# Patient Record
Sex: MALE | Race: WHITE | NOT HISPANIC OR LATINO | Employment: OTHER | URBAN - METROPOLITAN AREA
[De-identification: names, ages, dates, MRNs, and addresses within clinical notes are randomized per-mention and may not be internally consistent; named-entity substitution may affect disease eponyms.]

---

## 2017-01-01 ENCOUNTER — LAB VISIT (OUTPATIENT)
Dept: LAB | Facility: HOSPITAL | Age: 42
End: 2017-01-01
Attending: INTERNAL MEDICINE
Payer: MEDICARE

## 2017-01-01 ENCOUNTER — PATIENT MESSAGE (OUTPATIENT)
Dept: TRANSPLANT | Facility: CLINIC | Age: 42
End: 2017-01-01

## 2017-01-01 ENCOUNTER — TELEPHONE (OUTPATIENT)
Dept: CARDIOTHORACIC SURGERY | Facility: CLINIC | Age: 42
End: 2017-01-01

## 2017-01-01 ENCOUNTER — INFUSION (OUTPATIENT)
Dept: INFUSION THERAPY | Facility: HOSPITAL | Age: 42
End: 2017-01-01
Attending: INTERNAL MEDICINE
Payer: MEDICARE

## 2017-01-01 ENCOUNTER — HOSPITAL ENCOUNTER (OUTPATIENT)
Dept: RADIOLOGY | Facility: HOSPITAL | Age: 42
Discharge: HOME | End: 2017-10-04
Attending: INTERNAL MEDICINE
Payer: MEDICARE

## 2017-01-01 ENCOUNTER — HOSPITAL ENCOUNTER (OUTPATIENT)
Dept: RADIOLOGY | Facility: CLINIC | Age: 42
Discharge: HOME | End: 2017-11-30
Attending: INTERNAL MEDICINE
Payer: MEDICARE

## 2017-01-01 ENCOUNTER — TELEPHONE (OUTPATIENT)
Dept: TRANSPLANT | Facility: CLINIC | Age: 42
End: 2017-01-01

## 2017-01-01 ENCOUNTER — OFFICE VISIT (OUTPATIENT)
Dept: INTERNAL MEDICINE | Facility: CLINIC | Age: 42
End: 2017-01-01
Payer: MEDICARE

## 2017-01-01 ENCOUNTER — HOSPITAL ENCOUNTER (INPATIENT)
Facility: HOSPITAL | Age: 42
LOS: 5 days | Discharge: HOME | DRG: 193 | End: 2017-12-16
Attending: EMERGENCY MEDICINE | Admitting: INTERNAL MEDICINE
Payer: MEDICARE

## 2017-01-01 ENCOUNTER — OFFICE VISIT (OUTPATIENT)
Dept: ENDOCRINOLOGY | Facility: CLINIC | Age: 42
End: 2017-01-01
Payer: MEDICARE

## 2017-01-01 ENCOUNTER — HOSPITAL ENCOUNTER (OUTPATIENT)
Dept: RADIOLOGY | Facility: HOSPITAL | Age: 42
Discharge: HOME | End: 2017-12-20
Attending: INTERNAL MEDICINE
Payer: MEDICARE

## 2017-01-01 ENCOUNTER — OFFICE VISIT (OUTPATIENT)
Dept: HEMATOLOGY/ONCOLOGY | Facility: CLINIC | Age: 42
End: 2017-01-01
Payer: MEDICARE

## 2017-01-01 ENCOUNTER — TELEPHONE (OUTPATIENT)
Dept: INFUSION THERAPY | Facility: HOSPITAL | Age: 42
End: 2017-01-01

## 2017-01-01 ENCOUNTER — TELEPHONE (OUTPATIENT)
Dept: HEMATOLOGY/ONCOLOGY | Facility: CLINIC | Age: 42
End: 2017-01-01

## 2017-01-01 ENCOUNTER — HOSPITAL ENCOUNTER (OUTPATIENT)
Dept: RADIOLOGY | Facility: HOSPITAL | Age: 42
Discharge: HOME | End: 2017-09-15
Attending: INTERNAL MEDICINE
Payer: MEDICARE

## 2017-01-01 ENCOUNTER — TELEPHONE (OUTPATIENT)
Dept: INTERNAL MEDICINE | Facility: CLINIC | Age: 42
End: 2017-01-01

## 2017-01-01 ENCOUNTER — CLINICAL SUPPORT (OUTPATIENT)
Dept: GASTROENTEROLOGY | Facility: CLINIC | Age: 42
End: 2017-01-01
Payer: MEDICARE

## 2017-01-01 ENCOUNTER — HOSPITAL ENCOUNTER (OUTPATIENT)
Dept: CARDIOLOGY | Facility: CLINIC | Age: 42
Discharge: HOME | End: 2017-11-30
Attending: INTERNAL MEDICINE
Payer: MEDICARE

## 2017-01-01 ENCOUNTER — HOSPITAL ENCOUNTER (OUTPATIENT)
Dept: CARDIOLOGY | Facility: CLINIC | Age: 42
Discharge: HOME | End: 2017-11-30
Payer: MEDICARE

## 2017-01-01 ENCOUNTER — APPOINTMENT (OUTPATIENT)
Dept: RADIOLOGY | Facility: HOSPITAL | Age: 42
End: 2017-01-01
Attending: INTERNAL MEDICINE
Payer: MEDICARE

## 2017-01-01 ENCOUNTER — HOSPITAL ENCOUNTER (OUTPATIENT)
Dept: RADIOLOGY | Facility: HOSPITAL | Age: 42
Discharge: HOME | End: 2017-11-27
Attending: INTERNAL MEDICINE
Payer: MEDICARE

## 2017-01-01 ENCOUNTER — HOSPITAL ENCOUNTER (OUTPATIENT)
Dept: RADIOLOGY | Facility: HOSPITAL | Age: 42
Discharge: HOME | End: 2017-08-15
Attending: INTERNAL MEDICINE
Payer: MEDICARE

## 2017-01-01 ENCOUNTER — OFFICE VISIT (OUTPATIENT)
Dept: URGENT CARE | Facility: CLINIC | Age: 42
End: 2017-01-01
Payer: MEDICARE

## 2017-01-01 ENCOUNTER — HOSPITAL ENCOUNTER (OUTPATIENT)
Dept: RADIOLOGY | Facility: HOSPITAL | Age: 42
Discharge: HOME | End: 2017-06-29
Attending: INTERNAL MEDICINE
Payer: MEDICARE

## 2017-01-01 ENCOUNTER — DOCUMENTATION ONLY (OUTPATIENT)
Dept: TRANSPLANT | Facility: CLINIC | Age: 42
End: 2017-01-01

## 2017-01-01 ENCOUNTER — PATIENT OUTREACH (OUTPATIENT)
Dept: ADMINISTRATIVE | Facility: CLINIC | Age: 42
End: 2017-01-01

## 2017-01-01 ENCOUNTER — OFFICE VISIT (OUTPATIENT)
Dept: TRANSPLANT | Facility: CLINIC | Age: 42
End: 2017-01-01
Payer: MEDICARE

## 2017-01-01 ENCOUNTER — HOSPITAL ENCOUNTER (OUTPATIENT)
Dept: RADIOLOGY | Facility: HOSPITAL | Age: 42
Discharge: HOME | End: 2017-11-03
Attending: INTERNAL MEDICINE
Payer: MEDICARE

## 2017-01-01 ENCOUNTER — HOSPITAL ENCOUNTER (OUTPATIENT)
Dept: RADIOLOGY | Facility: HOSPITAL | Age: 42
Discharge: HOME | End: 2017-07-25
Attending: INTERNAL MEDICINE
Payer: MEDICARE

## 2017-01-01 ENCOUNTER — HOSPITAL ENCOUNTER (OUTPATIENT)
Dept: RADIOLOGY | Facility: HOSPITAL | Age: 42
Discharge: HOME | End: 2017-12-19
Attending: INTERNAL MEDICINE
Payer: MEDICARE

## 2017-01-01 ENCOUNTER — HOSPITAL ENCOUNTER (OUTPATIENT)
Dept: RADIOLOGY | Facility: HOSPITAL | Age: 42
Discharge: HOME | End: 2017-11-30
Attending: INTERNAL MEDICINE
Payer: MEDICARE

## 2017-01-01 ENCOUNTER — PATIENT MESSAGE (OUTPATIENT)
Dept: HEMATOLOGY/ONCOLOGY | Facility: CLINIC | Age: 42
End: 2017-01-01

## 2017-01-01 ENCOUNTER — OFFICE VISIT (OUTPATIENT)
Dept: NEPHROLOGY | Facility: CLINIC | Age: 42
End: 2017-01-01
Payer: MEDICARE

## 2017-01-01 ENCOUNTER — TELEPHONE (OUTPATIENT)
Dept: GASTROENTEROLOGY | Facility: CLINIC | Age: 42
End: 2017-01-01

## 2017-01-01 VITALS
RESPIRATION RATE: 16 BRPM | TEMPERATURE: 99 F | DIASTOLIC BLOOD PRESSURE: 61 MMHG | BODY MASS INDEX: 28.68 KG/M2 | HEART RATE: 112 BPM | SYSTOLIC BLOOD PRESSURE: 101 MMHG | HEIGHT: 67 IN | OXYGEN SATURATION: 96 % | WEIGHT: 182.75 LBS

## 2017-01-01 VITALS
HEART RATE: 108 BPM | RESPIRATION RATE: 20 BRPM | OXYGEN SATURATION: 93 % | WEIGHT: 192.25 LBS | TEMPERATURE: 99 F | BODY MASS INDEX: 30.17 KG/M2 | HEIGHT: 67 IN | DIASTOLIC BLOOD PRESSURE: 60 MMHG | SYSTOLIC BLOOD PRESSURE: 110 MMHG

## 2017-01-01 VITALS
DIASTOLIC BLOOD PRESSURE: 57 MMHG | HEART RATE: 108 BPM | BODY MASS INDEX: 29.17 KG/M2 | TEMPERATURE: 98 F | HEIGHT: 67 IN | SYSTOLIC BLOOD PRESSURE: 110 MMHG | RESPIRATION RATE: 20 BRPM | WEIGHT: 185.88 LBS | OXYGEN SATURATION: 96 %

## 2017-01-01 VITALS
HEIGHT: 67 IN | HEART RATE: 96 BPM | TEMPERATURE: 98 F | DIASTOLIC BLOOD PRESSURE: 52 MMHG | WEIGHT: 190.06 LBS | SYSTOLIC BLOOD PRESSURE: 97 MMHG | DIASTOLIC BLOOD PRESSURE: 56 MMHG | WEIGHT: 189.13 LBS | RESPIRATION RATE: 18 BRPM | HEIGHT: 67 IN | BODY MASS INDEX: 29.83 KG/M2 | RESPIRATION RATE: 18 BRPM | OXYGEN SATURATION: 95 % | SYSTOLIC BLOOD PRESSURE: 104 MMHG | TEMPERATURE: 98 F | BODY MASS INDEX: 29.69 KG/M2 | OXYGEN SATURATION: 96 % | HEART RATE: 92 BPM

## 2017-01-01 VITALS
WEIGHT: 190 LBS | HEIGHT: 67 IN | SYSTOLIC BLOOD PRESSURE: 115 MMHG | RESPIRATION RATE: 16 BRPM | HEART RATE: 100 BPM | DIASTOLIC BLOOD PRESSURE: 60 MMHG | WEIGHT: 185 LBS | OXYGEN SATURATION: 100 % | OXYGEN SATURATION: 96 % | HEIGHT: 67 IN | DIASTOLIC BLOOD PRESSURE: 60 MMHG | RESPIRATION RATE: 18 BRPM | TEMPERATURE: 98 F | HEART RATE: 103 BPM | SYSTOLIC BLOOD PRESSURE: 118 MMHG | BODY MASS INDEX: 29.03 KG/M2 | BODY MASS INDEX: 29.82 KG/M2 | TEMPERATURE: 98 F

## 2017-01-01 VITALS
WEIGHT: 188.5 LBS | DIASTOLIC BLOOD PRESSURE: 76 MMHG | HEART RATE: 113 BPM | BODY MASS INDEX: 29.58 KG/M2 | RESPIRATION RATE: 18 BRPM | OXYGEN SATURATION: 97 % | HEIGHT: 67 IN | TEMPERATURE: 99 F | SYSTOLIC BLOOD PRESSURE: 132 MMHG

## 2017-01-01 VITALS
DIASTOLIC BLOOD PRESSURE: 64 MMHG | HEART RATE: 102 BPM | BODY MASS INDEX: 31.15 KG/M2 | SYSTOLIC BLOOD PRESSURE: 118 MMHG | OXYGEN SATURATION: 98 % | DIASTOLIC BLOOD PRESSURE: 62 MMHG | HEART RATE: 54 BPM | BODY MASS INDEX: 29.23 KG/M2 | HEIGHT: 68 IN | WEIGHT: 198.44 LBS | WEIGHT: 192.88 LBS | SYSTOLIC BLOOD PRESSURE: 108 MMHG | TEMPERATURE: 98 F | HEIGHT: 67 IN

## 2017-01-01 VITALS
HEART RATE: 107 BPM | HEIGHT: 67 IN | BODY MASS INDEX: 30.97 KG/M2 | DIASTOLIC BLOOD PRESSURE: 72 MMHG | SYSTOLIC BLOOD PRESSURE: 125 MMHG | WEIGHT: 197.31 LBS

## 2017-01-01 VITALS
OXYGEN SATURATION: 98 % | HEART RATE: 110 BPM | DIASTOLIC BLOOD PRESSURE: 56 MMHG | WEIGHT: 182.13 LBS | TEMPERATURE: 98 F | SYSTOLIC BLOOD PRESSURE: 100 MMHG | RESPIRATION RATE: 18 BRPM | BODY MASS INDEX: 28.58 KG/M2 | HEIGHT: 67 IN

## 2017-01-01 VITALS
DIASTOLIC BLOOD PRESSURE: 62 MMHG | HEART RATE: 95 BPM | RESPIRATION RATE: 16 BRPM | OXYGEN SATURATION: 97 % | HEIGHT: 67 IN | SYSTOLIC BLOOD PRESSURE: 105 MMHG | WEIGHT: 189 LBS | BODY MASS INDEX: 29.66 KG/M2 | TEMPERATURE: 98 F

## 2017-01-01 VITALS
OXYGEN SATURATION: 98 % | HEIGHT: 67 IN | RESPIRATION RATE: 15 BRPM | BODY MASS INDEX: 28.25 KG/M2 | WEIGHT: 180 LBS | HEART RATE: 100 BPM | SYSTOLIC BLOOD PRESSURE: 135 MMHG | DIASTOLIC BLOOD PRESSURE: 60 MMHG

## 2017-01-01 VITALS
BODY MASS INDEX: 29.82 KG/M2 | RESPIRATION RATE: 16 BRPM | DIASTOLIC BLOOD PRESSURE: 58 MMHG | HEIGHT: 67 IN | OXYGEN SATURATION: 100 % | HEART RATE: 102 BPM | SYSTOLIC BLOOD PRESSURE: 121 MMHG | WEIGHT: 190 LBS

## 2017-01-01 VITALS
BODY MASS INDEX: 29.45 KG/M2 | DIASTOLIC BLOOD PRESSURE: 44 MMHG | WEIGHT: 187.63 LBS | SYSTOLIC BLOOD PRESSURE: 95 MMHG | HEART RATE: 98 BPM | HEIGHT: 67 IN

## 2017-01-01 VITALS
HEART RATE: 98 BPM | HEIGHT: 67 IN | HEART RATE: 99 BPM | DIASTOLIC BLOOD PRESSURE: 69 MMHG | BODY MASS INDEX: 29.27 KG/M2 | WEIGHT: 186.5 LBS | DIASTOLIC BLOOD PRESSURE: 70 MMHG | SYSTOLIC BLOOD PRESSURE: 101 MMHG | OXYGEN SATURATION: 96 % | SYSTOLIC BLOOD PRESSURE: 106 MMHG | TEMPERATURE: 98 F

## 2017-01-01 VITALS
WEIGHT: 184.06 LBS | DIASTOLIC BLOOD PRESSURE: 62 MMHG | HEIGHT: 68 IN | BODY MASS INDEX: 27.9 KG/M2 | SYSTOLIC BLOOD PRESSURE: 106 MMHG | HEART RATE: 62 BPM

## 2017-01-01 VITALS
SYSTOLIC BLOOD PRESSURE: 98 MMHG | RESPIRATION RATE: 16 BRPM | WEIGHT: 193.13 LBS | HEIGHT: 67 IN | DIASTOLIC BLOOD PRESSURE: 58 MMHG | BODY MASS INDEX: 30.31 KG/M2 | HEART RATE: 68 BPM

## 2017-01-01 VITALS
BODY MASS INDEX: 28.37 KG/M2 | HEART RATE: 128 BPM | HEIGHT: 67 IN | OXYGEN SATURATION: 98 % | RESPIRATION RATE: 18 BRPM | DIASTOLIC BLOOD PRESSURE: 55 MMHG | TEMPERATURE: 102 F | WEIGHT: 180.75 LBS | SYSTOLIC BLOOD PRESSURE: 118 MMHG

## 2017-01-01 VITALS
SYSTOLIC BLOOD PRESSURE: 133 MMHG | OXYGEN SATURATION: 98 % | DIASTOLIC BLOOD PRESSURE: 70 MMHG | RESPIRATION RATE: 16 BRPM | TEMPERATURE: 98 F | HEART RATE: 102 BPM

## 2017-01-01 VITALS
HEART RATE: 90 BPM | WEIGHT: 180 LBS | DIASTOLIC BLOOD PRESSURE: 68 MMHG | HEIGHT: 67 IN | BODY MASS INDEX: 28.25 KG/M2 | OXYGEN SATURATION: 100 % | RESPIRATION RATE: 18 BRPM | SYSTOLIC BLOOD PRESSURE: 110 MMHG

## 2017-01-01 VITALS
OXYGEN SATURATION: 100 % | DIASTOLIC BLOOD PRESSURE: 63 MMHG | SYSTOLIC BLOOD PRESSURE: 117 MMHG | HEART RATE: 104 BPM | RESPIRATION RATE: 15 BRPM

## 2017-01-01 VITALS
SYSTOLIC BLOOD PRESSURE: 119 MMHG | DIASTOLIC BLOOD PRESSURE: 66 MMHG | HEART RATE: 113 BPM | WEIGHT: 192.44 LBS | HEIGHT: 67 IN | BODY MASS INDEX: 30.2 KG/M2

## 2017-01-01 VITALS
HEART RATE: 84 BPM | BODY MASS INDEX: 29.45 KG/M2 | HEIGHT: 67 IN | WEIGHT: 187.63 LBS | SYSTOLIC BLOOD PRESSURE: 80 MMHG | DIASTOLIC BLOOD PRESSURE: 56 MMHG

## 2017-01-01 VITALS
BODY MASS INDEX: 30.1 KG/M2 | OXYGEN SATURATION: 95 % | HEART RATE: 119 BPM | WEIGHT: 191.81 LBS | HEIGHT: 67 IN | SYSTOLIC BLOOD PRESSURE: 114 MMHG | TEMPERATURE: 98 F | DIASTOLIC BLOOD PRESSURE: 60 MMHG

## 2017-01-01 VITALS — WEIGHT: 182.75 LBS | BODY MASS INDEX: 28.62 KG/M2

## 2017-01-01 VITALS
WEIGHT: 179.88 LBS | SYSTOLIC BLOOD PRESSURE: 112 MMHG | HEART RATE: 101 BPM | BODY MASS INDEX: 28.23 KG/M2 | DIASTOLIC BLOOD PRESSURE: 76 MMHG | HEIGHT: 67 IN

## 2017-01-01 DIAGNOSIS — R05.9 COUGH: ICD-10-CM

## 2017-01-01 DIAGNOSIS — E06.3 HYPOTHYROIDISM DUE TO HASHIMOTO'S THYROIDITIS: ICD-10-CM

## 2017-01-01 DIAGNOSIS — E06.3 HYPOTHYROIDISM DUE TO HASHIMOTO'S THYROIDITIS: Chronic | ICD-10-CM

## 2017-01-01 DIAGNOSIS — E10.22 TYPE 1 DIABETES MELLITUS WITH STAGE 3 CHRONIC KIDNEY DISEASE: Chronic | ICD-10-CM

## 2017-01-01 DIAGNOSIS — N28.9 RENAL INSUFFICIENCY: ICD-10-CM

## 2017-01-01 DIAGNOSIS — E10.22 TYPE 1 DIABETES MELLITUS WITH STAGE 3 CHRONIC KIDNEY DISEASE: Primary | ICD-10-CM

## 2017-01-01 DIAGNOSIS — R00.0 TACHYCARDIA: ICD-10-CM

## 2017-01-01 DIAGNOSIS — E78.2 MIXED HYPERLIPIDEMIA: ICD-10-CM

## 2017-01-01 DIAGNOSIS — E03.8 HYPOTHYROIDISM DUE TO HASHIMOTO'S THYROIDITIS: ICD-10-CM

## 2017-01-01 DIAGNOSIS — N18.30 CHRONIC KIDNEY DISEASE (CKD), STAGE III (MODERATE): ICD-10-CM

## 2017-01-01 DIAGNOSIS — Z86.79: ICD-10-CM

## 2017-01-01 DIAGNOSIS — N18.30 ANEMIA OF CHRONIC RENAL FAILURE, STAGE 3 (MODERATE): Primary | ICD-10-CM

## 2017-01-01 DIAGNOSIS — J18.9 PNEUMONIA OF BOTH LUNGS DUE TO INFECTIOUS ORGANISM, UNSPECIFIED PART OF LUNG: Primary | ICD-10-CM

## 2017-01-01 DIAGNOSIS — D50.9 IRON DEFICIENCY ANEMIA, UNSPECIFIED: ICD-10-CM

## 2017-01-01 DIAGNOSIS — D63.1 ANEMIA OF CHRONIC RENAL FAILURE, STAGE 3 (MODERATE): ICD-10-CM

## 2017-01-01 DIAGNOSIS — D63.1 ANEMIA OF CHRONIC RENAL FAILURE, STAGE 3 (MODERATE): Primary | ICD-10-CM

## 2017-01-01 DIAGNOSIS — R19.7 DIARRHEA, UNSPECIFIED TYPE: ICD-10-CM

## 2017-01-01 DIAGNOSIS — H57.12 LEFT EYE PAIN: Primary | ICD-10-CM

## 2017-01-01 DIAGNOSIS — D63.1 ANEMIA OF CHRONIC RENAL FAILURE, STAGE 4 (SEVERE): ICD-10-CM

## 2017-01-01 DIAGNOSIS — R05.9 COUGH IN ADULT: ICD-10-CM

## 2017-01-01 DIAGNOSIS — J90 PLEURAL EFFUSION: ICD-10-CM

## 2017-01-01 DIAGNOSIS — N18.4 CKD (CHRONIC KIDNEY DISEASE) STAGE 4, GFR 15-29 ML/MIN: Primary | ICD-10-CM

## 2017-01-01 DIAGNOSIS — Z94.1 HEART TRANSPLANTED: Primary | ICD-10-CM

## 2017-01-01 DIAGNOSIS — Z94.1 STATUS POST HEART TRANSPLANT: ICD-10-CM

## 2017-01-01 DIAGNOSIS — E78.5 HYPERLIPIDEMIA LDL GOAL <70: ICD-10-CM

## 2017-01-01 DIAGNOSIS — I10 ESSENTIAL HYPERTENSION: ICD-10-CM

## 2017-01-01 DIAGNOSIS — N18.30 TYPE 1 DIABETES MELLITUS WITH STAGE 3 CHRONIC KIDNEY DISEASE: Chronic | ICD-10-CM

## 2017-01-01 DIAGNOSIS — T86.20 COMPLICATION OF HEART TRANSPLANT, UNSPECIFIED COMPLICATION: ICD-10-CM

## 2017-01-01 DIAGNOSIS — Z94.1 STATUS POST HEART TRANSPLANT: Primary | ICD-10-CM

## 2017-01-01 DIAGNOSIS — D84.9 IMMUNOSUPPRESSION: ICD-10-CM

## 2017-01-01 DIAGNOSIS — Z23 NEED FOR HEPATITIS A AND B VACCINATION: ICD-10-CM

## 2017-01-01 DIAGNOSIS — N18.30 ANEMIA OF CHRONIC RENAL FAILURE, STAGE 3 (MODERATE): ICD-10-CM

## 2017-01-01 DIAGNOSIS — E10.65 TYPE 1 DIABETES MELLITUS WITH HYPERGLYCEMIA: Primary | Chronic | ICD-10-CM

## 2017-01-01 DIAGNOSIS — R05.9 COUGH: Primary | ICD-10-CM

## 2017-01-01 DIAGNOSIS — N18.30 TYPE 1 DIABETES MELLITUS WITH STAGE 3 CHRONIC KIDNEY DISEASE: ICD-10-CM

## 2017-01-01 DIAGNOSIS — E03.8 HYPOTHYROIDISM DUE TO HASHIMOTO'S THYROIDITIS: Chronic | ICD-10-CM

## 2017-01-01 DIAGNOSIS — Z94.1 HEART TRANSPLANTED: ICD-10-CM

## 2017-01-01 DIAGNOSIS — Z29.89 NEED FOR PROPHYLACTIC IMMUNOTHERAPY: ICD-10-CM

## 2017-01-01 DIAGNOSIS — Z12.5 SCREENING FOR PROSTATE CANCER: ICD-10-CM

## 2017-01-01 DIAGNOSIS — R18.8 OTHER ASCITES: ICD-10-CM

## 2017-01-01 DIAGNOSIS — N18.30 TYPE 1 DIABETES MELLITUS WITH STAGE 3 CHRONIC KIDNEY DISEASE: Primary | ICD-10-CM

## 2017-01-01 DIAGNOSIS — H57.12 LEFT EYE PAIN: ICD-10-CM

## 2017-01-01 DIAGNOSIS — Z79.52 LONG TERM CURRENT USE OF SYSTEMIC STEROIDS: ICD-10-CM

## 2017-01-01 DIAGNOSIS — Z23 NEED FOR INFLUENZA VACCINATION: ICD-10-CM

## 2017-01-01 DIAGNOSIS — N17.9 AKI (ACUTE KIDNEY INJURY): ICD-10-CM

## 2017-01-01 DIAGNOSIS — Z79.899 ENCOUNTER FOR LONG-TERM (CURRENT) USE OF MEDICATIONS: ICD-10-CM

## 2017-01-01 DIAGNOSIS — R06.02 SHORTNESS OF BREATH: ICD-10-CM

## 2017-01-01 DIAGNOSIS — Z79.60 LONG-TERM USE OF IMMUNOSUPPRESSANT MEDICATION: ICD-10-CM

## 2017-01-01 DIAGNOSIS — Z48.298 TRANSPLANT FOLLOW-UP: ICD-10-CM

## 2017-01-01 DIAGNOSIS — R18.8 OTHER ASCITES: Primary | ICD-10-CM

## 2017-01-01 DIAGNOSIS — R11.0 NAUSEA: Primary | ICD-10-CM

## 2017-01-01 DIAGNOSIS — N18.30 CKD (CHRONIC KIDNEY DISEASE) STAGE 3, GFR 30-59 ML/MIN: ICD-10-CM

## 2017-01-01 DIAGNOSIS — I50.22 CHRONIC SYSTOLIC CONGESTIVE HEART FAILURE: ICD-10-CM

## 2017-01-01 DIAGNOSIS — E10.22 TYPE 1 DIABETES MELLITUS WITH STAGE 3 CHRONIC KIDNEY DISEASE: ICD-10-CM

## 2017-01-01 DIAGNOSIS — R18.8 ASCITES: Primary | ICD-10-CM

## 2017-01-01 DIAGNOSIS — I73.9 PVD (PERIPHERAL VASCULAR DISEASE): ICD-10-CM

## 2017-01-01 DIAGNOSIS — Z79.52 CURRENT USE OF STEROID MEDICATION: Chronic | ICD-10-CM

## 2017-01-01 DIAGNOSIS — N18.4 ANEMIA OF CHRONIC RENAL FAILURE, STAGE 4 (SEVERE): ICD-10-CM

## 2017-01-01 DIAGNOSIS — M54.50 ACUTE LEFT-SIDED LOW BACK PAIN WITHOUT SCIATICA: Primary | ICD-10-CM

## 2017-01-01 DIAGNOSIS — M85.80 OSTEOPENIA, UNSPECIFIED LOCATION: ICD-10-CM

## 2017-01-01 DIAGNOSIS — K21.9 GASTROESOPHAGEAL REFLUX DISEASE, ESOPHAGITIS PRESENCE NOT SPECIFIED: Primary | ICD-10-CM

## 2017-01-01 DIAGNOSIS — R31.9 HEMATURIA, UNSPECIFIED TYPE: ICD-10-CM

## 2017-01-01 DIAGNOSIS — R53.83 FATIGUE, UNSPECIFIED TYPE: ICD-10-CM

## 2017-01-01 LAB
ALBUMIN FLD-MCNC: 1.6 G/DL
ALBUMIN SERPL BCP-MCNC: 2.4 G/DL
ALBUMIN SERPL BCP-MCNC: 2.5 G/DL
ALBUMIN SERPL BCP-MCNC: 2.6 G/DL
ALBUMIN SERPL BCP-MCNC: 2.6 G/DL
ALBUMIN SERPL BCP-MCNC: 2.8 G/DL
ALBUMIN SERPL BCP-MCNC: 3.2 G/DL
ALBUMIN SERPL BCP-MCNC: 3.3 G/DL
ALBUMIN SERPL BCP-MCNC: 3.5 G/DL
ALP SERPL-CCNC: 188 U/L
ALP SERPL-CCNC: 188 U/L
ALP SERPL-CCNC: 194 U/L
ALP SERPL-CCNC: 195 U/L
ALP SERPL-CCNC: 202 U/L
ALP SERPL-CCNC: 220 U/L
ALP SERPL-CCNC: 223 U/L
ALP SERPL-CCNC: 244 U/L
ALT SERPL W/O P-5'-P-CCNC: 12 U/L
ALT SERPL W/O P-5'-P-CCNC: 13 U/L
ALT SERPL W/O P-5'-P-CCNC: 15 U/L
ALT SERPL W/O P-5'-P-CCNC: 16 U/L
ALT SERPL W/O P-5'-P-CCNC: 16 U/L
ALT SERPL W/O P-5'-P-CCNC: 18 U/L
AMYLASE, BODY FLUID: 25 U/L
ANION GAP SERPL CALC-SCNC: 10 MMOL/L
ANION GAP SERPL CALC-SCNC: 10 MMOL/L
ANION GAP SERPL CALC-SCNC: 11 MMOL/L
ANION GAP SERPL CALC-SCNC: 12 MMOL/L
ANION GAP SERPL CALC-SCNC: 13 MMOL/L
ANION GAP SERPL CALC-SCNC: 13 MMOL/L
ANION GAP SERPL CALC-SCNC: 7 MMOL/L
ANION GAP SERPL CALC-SCNC: 7 MMOL/L
ANION GAP SERPL CALC-SCNC: 8 MMOL/L
ANISOCYTOSIS BLD QL SMEAR: SLIGHT
ANISOCYTOSIS BLD QL SMEAR: SLIGHT
APPEARANCE FLD: NORMAL
AST SERPL-CCNC: 20 U/L
AST SERPL-CCNC: 20 U/L
AST SERPL-CCNC: 21 U/L
AST SERPL-CCNC: 24 U/L
AST SERPL-CCNC: 25 U/L
AST SERPL-CCNC: 25 U/L
AST SERPL-CCNC: 26 U/L
AST SERPL-CCNC: 30 U/L
BACTERIA #/AREA URNS AUTO: ABNORMAL /HPF
BACTERIA BLD CULT: NORMAL
BACTERIA BLD CULT: NORMAL
BACTERIA SPEC AEROBE CULT: NO GROWTH
BACTERIA SPEC AEROBE CULT: NORMAL
BACTERIA SPEC ANAEROBE CULT: NORMAL
BACTERIA UR CULT: NORMAL
BACTERIA UR CULT: NORMAL
BASOPHILS # BLD AUTO: 0.01 K/UL
BASOPHILS # BLD AUTO: 0.02 K/UL
BASOPHILS # BLD AUTO: 0.03 K/UL
BASOPHILS # BLD AUTO: 0.04 K/UL
BASOPHILS # BLD AUTO: ABNORMAL K/UL
BASOPHILS # BLD AUTO: ABNORMAL K/UL
BASOPHILS NFR BLD: 0 %
BASOPHILS NFR BLD: 0 %
BASOPHILS NFR BLD: 0.1 %
BASOPHILS NFR BLD: 0.1 %
BASOPHILS NFR BLD: 0.2 %
BASOPHILS NFR BLD: 0.2 %
BASOPHILS NFR BLD: 0.3 %
BASOPHILS NFR BLD: 0.3 %
BASOPHILS NFR BLD: 0.4 %
BASOPHILS NFR BLD: 0.4 %
BASOPHILS NFR BLD: 0.5 %
BASOPHILS NFR BLD: 0.6 %
BILIRUB SERPL-MCNC: 0.5 MG/DL
BILIRUB SERPL-MCNC: 0.6 MG/DL
BILIRUB SERPL-MCNC: 0.7 MG/DL
BILIRUB SERPL-MCNC: 0.7 MG/DL
BILIRUB SERPL-MCNC: 1.1 MG/DL
BILIRUB SERPL-MCNC: 1.2 MG/DL
BILIRUB SERPL-MCNC: NEGATIVE MG/DL
BILIRUB UR QL STRIP: NEGATIVE
BILIRUB UR QL STRIP: NEGATIVE
BLOOD URINE, POC: ABNORMAL
BNP SERPL-MCNC: 165 PG/ML
BNP SERPL-MCNC: 179 PG/ML
BNP SERPL-MCNC: 180 PG/ML
BNP SERPL-MCNC: 330 PG/ML
BODY FLD TYPE: NORMAL
BODY FLUID SOURCE AMYLASE: NORMAL
BODY FLUID SOURCE, LDH: NORMAL
BUN SERPL-MCNC: 36 MG/DL
BUN SERPL-MCNC: 37 MG/DL
BUN SERPL-MCNC: 42 MG/DL
BUN SERPL-MCNC: 47 MG/DL
BUN SERPL-MCNC: 55 MG/DL
BUN SERPL-MCNC: 55 MG/DL
BUN SERPL-MCNC: 56 MG/DL
BUN SERPL-MCNC: 58 MG/DL
BUN SERPL-MCNC: 61 MG/DL
BUN SERPL-MCNC: 62 MG/DL
C1Q1 TESTING DATE: NORMAL
C1Q2 TESTING DATE: NORMAL
CALCIUM SERPL-MCNC: 10.4 MG/DL
CALCIUM SERPL-MCNC: 8.3 MG/DL
CALCIUM SERPL-MCNC: 8.6 MG/DL
CALCIUM SERPL-MCNC: 8.6 MG/DL
CALCIUM SERPL-MCNC: 8.7 MG/DL
CALCIUM SERPL-MCNC: 8.9 MG/DL
CALCIUM SERPL-MCNC: 9.3 MG/DL
CALCIUM SERPL-MCNC: 9.4 MG/DL
CHLORIDE SERPL-SCNC: 100 MMOL/L
CHLORIDE SERPL-SCNC: 101 MMOL/L
CHLORIDE SERPL-SCNC: 102 MMOL/L
CHLORIDE SERPL-SCNC: 102 MMOL/L
CHLORIDE SERPL-SCNC: 103 MMOL/L
CHLORIDE SERPL-SCNC: 94 MMOL/L
CHLORIDE SERPL-SCNC: 96 MMOL/L
CHLORIDE SERPL-SCNC: 97 MMOL/L
CHOLEST FLD-MCNC: 35 MG/DL
CHOLEST SERPL-MCNC: 117 MG/DL
CHOLEST/HDLC SERPL: 3.9 {RATIO}
CLARITY UR REFRACT.AUTO: ABNORMAL
CLARITY UR REFRACT.AUTO: CLEAR
CLASS I ANTIBODY COMMENTS - LUMINEX: NORMAL
CLASS II ANTIBODY COMMENTS - LUMINEX: NORMAL
CMV DNA SERPL NAA+PROBE-ACNC: NORMAL IU/ML
CO2 SERPL-SCNC: 23 MMOL/L
CO2 SERPL-SCNC: 23 MMOL/L
CO2 SERPL-SCNC: 26 MMOL/L
CO2 SERPL-SCNC: 27 MMOL/L
CO2 SERPL-SCNC: 28 MMOL/L
CO2 SERPL-SCNC: 28 MMOL/L
CO2 SERPL-SCNC: 29 MMOL/L
CO2 SERPL-SCNC: 29 MMOL/L
CO2 SERPL-SCNC: 30 MMOL/L
COLOR FLD: YELLOW
COLOR UR AUTO: YELLOW
COLOR UR AUTO: YELLOW
COLOR, POC UA: ABNORMAL
CREAT SERPL-MCNC: 2.2 MG/DL
CREAT SERPL-MCNC: 2.4 MG/DL
CREAT SERPL-MCNC: 2.4 MG/DL
CREAT SERPL-MCNC: 2.5 MG/DL
CREAT SERPL-MCNC: 2.5 MG/DL
CREAT SERPL-MCNC: 2.6 MG/DL
CREAT SERPL-MCNC: 2.8 MG/DL
CREAT SERPL-MCNC: 2.9 MG/DL
CREAT SERPL-MCNC: 3 MG/DL
CREAT SERPL-MCNC: 3.4 MG/DL
CREAT SERPL-MCNC: 3.5 MG/DL
CREAT SERPL-MCNC: 3.6 MG/DL
CREAT UR-MCNC: 155 MG/DL
CTP QC/QA: YES
CTP QC/QA: YES
DIASTOLIC DYSFUNCTION: NO
DIASTOLIC DYSFUNCTION: NO
DIFFERENTIAL METHOD: ABNORMAL
DSA1 TESTING DATE: NORMAL
DSA1 TESTING DATE: NORMAL
DSA12 TESTING DATE: NORMAL
DSA12 TESTING DATE: NORMAL
DSA2 TESTING DATE: NORMAL
DSA2 TESTING DATE: NORMAL
ENTEROVIRUS: NOT DETECTED
EOSINOPHIL # BLD AUTO: 0.1 K/UL
EOSINOPHIL # BLD AUTO: 0.2 K/UL
EOSINOPHIL # BLD AUTO: ABNORMAL K/UL
EOSINOPHIL # BLD AUTO: ABNORMAL K/UL
EOSINOPHIL NFR BLD: 0 %
EOSINOPHIL NFR BLD: 1 %
EOSINOPHIL NFR BLD: 1 %
EOSINOPHIL NFR BLD: 1.3 %
EOSINOPHIL NFR BLD: 1.3 %
EOSINOPHIL NFR BLD: 1.6 %
EOSINOPHIL NFR BLD: 1.6 %
EOSINOPHIL NFR BLD: 1.9 %
EOSINOPHIL NFR BLD: 1.9 %
EOSINOPHIL NFR BLD: 2.1 %
EOSINOPHIL NFR BLD: 2.3 %
EOSINOPHIL NFR BLD: 2.4 %
ERYTHROCYTE [DISTWIDTH] IN BLOOD BY AUTOMATED COUNT: 12 %
ERYTHROCYTE [DISTWIDTH] IN BLOOD BY AUTOMATED COUNT: 12 %
ERYTHROCYTE [DISTWIDTH] IN BLOOD BY AUTOMATED COUNT: 12.1 %
ERYTHROCYTE [DISTWIDTH] IN BLOOD BY AUTOMATED COUNT: 12.2 %
ERYTHROCYTE [DISTWIDTH] IN BLOOD BY AUTOMATED COUNT: 12.3 %
ERYTHROCYTE [DISTWIDTH] IN BLOOD BY AUTOMATED COUNT: 12.4 %
ERYTHROCYTE [DISTWIDTH] IN BLOOD BY AUTOMATED COUNT: 12.8 %
ERYTHROCYTE [DISTWIDTH] IN BLOOD BY AUTOMATED COUNT: 12.9 %
ERYTHROCYTE [DISTWIDTH] IN BLOOD BY AUTOMATED COUNT: 13 %
ERYTHROCYTE [DISTWIDTH] IN BLOOD BY AUTOMATED COUNT: 13.2 %
ERYTHROCYTE [SEDIMENTATION RATE] IN BLOOD BY WESTERGREN METHOD: 52 MM/HR
EST. GFR  (AFRICAN AMERICAN): 22.4 ML/MIN/1.73 M^2
EST. GFR  (AFRICAN AMERICAN): 23.2 ML/MIN/1.73 M^2
EST. GFR  (AFRICAN AMERICAN): 24 ML/MIN/1.73 M^2
EST. GFR  (AFRICAN AMERICAN): 27.9 ML/MIN/1.73 M^2
EST. GFR  (AFRICAN AMERICAN): 29.1 ML/MIN/1.73 M^2
EST. GFR  (AFRICAN AMERICAN): 30.3 ML/MIN/1.73 M^2
EST. GFR  (AFRICAN AMERICAN): 33.2 ML/MIN/1.73 M^2
EST. GFR  (AFRICAN AMERICAN): 34.8 ML/MIN/1.73 M^2
EST. GFR  (AFRICAN AMERICAN): 34.8 ML/MIN/1.73 M^2
EST. GFR  (AFRICAN AMERICAN): 36.6 ML/MIN/1.73 M^2
EST. GFR  (AFRICAN AMERICAN): 37 ML/MIN/1.73 M^2
EST. GFR  (AFRICAN AMERICAN): 40.6 ML/MIN/1.73 M^2
EST. GFR  (NON AFRICAN AMERICAN): 19.4 ML/MIN/1.73 M^2
EST. GFR  (NON AFRICAN AMERICAN): 20 ML/MIN/1.73 M^2
EST. GFR  (NON AFRICAN AMERICAN): 20.8 ML/MIN/1.73 M^2
EST. GFR  (NON AFRICAN AMERICAN): 24.1 ML/MIN/1.73 M^2
EST. GFR  (NON AFRICAN AMERICAN): 25.2 ML/MIN/1.73 M^2
EST. GFR  (NON AFRICAN AMERICAN): 26.2 ML/MIN/1.73 M^2
EST. GFR  (NON AFRICAN AMERICAN): 28.7 ML/MIN/1.73 M^2
EST. GFR  (NON AFRICAN AMERICAN): 30.1 ML/MIN/1.73 M^2
EST. GFR  (NON AFRICAN AMERICAN): 30.1 ML/MIN/1.73 M^2
EST. GFR  (NON AFRICAN AMERICAN): 31.6 ML/MIN/1.73 M^2
EST. GFR  (NON AFRICAN AMERICAN): 32 ML/MIN/1.73 M^2
EST. GFR  (NON AFRICAN AMERICAN): 35.1 ML/MIN/1.73 M^2
ESTIMATED PA SYSTOLIC PRESSURE: 35.25
ESTIMATED PA SYSTOLIC PRESSURE: 40.95
FLUAV AG NPH QL: NEGATIVE
FLUAV AG SPEC QL IA: NEGATIVE
FLUBV AG NPH QL: NEGATIVE
FLUBV AG SPEC QL IA: NEGATIVE
GLUCOSE FLD-MCNC: 189 MG/DL
GLUCOSE SERPL-MCNC: 105 MG/DL
GLUCOSE SERPL-MCNC: 149 MG/DL
GLUCOSE SERPL-MCNC: 163 MG/DL
GLUCOSE SERPL-MCNC: 163 MG/DL
GLUCOSE SERPL-MCNC: 170 MG/DL
GLUCOSE SERPL-MCNC: 170 MG/DL
GLUCOSE SERPL-MCNC: 208 MG/DL
GLUCOSE SERPL-MCNC: 236 MG/DL
GLUCOSE SERPL-MCNC: 289 MG/DL
GLUCOSE SERPL-MCNC: 356 MG/DL
GLUCOSE SERPL-MCNC: 82 MG/DL
GLUCOSE SERPL-MCNC: 89 MG/DL
GLUCOSE UR QL STRIP: NEGATIVE
GLUCOSE UR QL STRIP: NEGATIVE
GLUCOSE UR QL STRIP: NORMAL
GRAM STN SPEC: NORMAL
HCT VFR BLD AUTO: 25.7 %
HCT VFR BLD AUTO: 26 %
HCT VFR BLD AUTO: 26.3 %
HCT VFR BLD AUTO: 26.4 %
HCT VFR BLD AUTO: 26.4 %
HCT VFR BLD AUTO: 26.6 %
HCT VFR BLD AUTO: 27.8 %
HCT VFR BLD AUTO: 28.5 %
HCT VFR BLD AUTO: 31.6 %
HCT VFR BLD AUTO: 32.5 %
HCT VFR BLD AUTO: 32.9 %
HCT VFR BLD AUTO: 34.7 %
HDLC SERPL-MCNC: 30 MG/DL
HDLC SERPL: 25.6 %
HGB BLD-MCNC: 10.7 G/DL
HGB BLD-MCNC: 10.8 G/DL
HGB BLD-MCNC: 11.1 G/DL
HGB BLD-MCNC: 11.2 G/DL
HGB BLD-MCNC: 8.6 G/DL
HGB BLD-MCNC: 8.7 G/DL
HGB BLD-MCNC: 8.8 G/DL
HGB BLD-MCNC: 8.8 G/DL
HGB BLD-MCNC: 8.9 G/DL
HGB BLD-MCNC: 8.9 G/DL
HGB BLD-MCNC: 9 G/DL
HGB BLD-MCNC: 9.5 G/DL
HGB UR QL STRIP: NEGATIVE
HGB UR QL STRIP: NEGATIVE
HUMAN BOCAVIRUS: NOT DETECTED
HUMAN CORONAVIRUS, COMMON COLD VIRUS: NOT DETECTED
HYALINE CASTS UR QL AUTO: 10 /LPF
IMM GRANULOCYTES # BLD AUTO: 0.03 K/UL
IMM GRANULOCYTES # BLD AUTO: 0.05 K/UL
IMM GRANULOCYTES # BLD AUTO: 0.05 K/UL
IMM GRANULOCYTES # BLD AUTO: 0.08 K/UL
IMM GRANULOCYTES # BLD AUTO: 0.08 K/UL
IMM GRANULOCYTES # BLD AUTO: 0.1 K/UL
IMM GRANULOCYTES # BLD AUTO: 0.23 K/UL
IMM GRANULOCYTES # BLD AUTO: ABNORMAL K/UL
IMM GRANULOCYTES # BLD AUTO: ABNORMAL K/UL
IMM GRANULOCYTES NFR BLD AUTO: 0.7 %
IMM GRANULOCYTES NFR BLD AUTO: 0.7 %
IMM GRANULOCYTES NFR BLD AUTO: 1.1 %
IMM GRANULOCYTES NFR BLD AUTO: 1.4 %
IMM GRANULOCYTES NFR BLD AUTO: 1.5 %
IMM GRANULOCYTES NFR BLD AUTO: 1.5 %
IMM GRANULOCYTES NFR BLD AUTO: 3.9 %
IMM GRANULOCYTES NFR BLD AUTO: ABNORMAL %
IMM GRANULOCYTES NFR BLD AUTO: ABNORMAL %
INFLUENZA A - H1N1-09: NOT DETECTED
KETONES UR QL STRIP: NEGATIVE
KOH PREP SPEC: NORMAL
L PNEUMO AG UR QL IA: NOT DETECTED
LDH FLD L TO P-CCNC: 193 U/L
LDH SERPL L TO P-CCNC: 257 U/L
LDLC SERPL CALC-MCNC: 73.2 MG/DL
LEUKOCYTE ESTERASE UR QL STRIP: ABNORMAL
LEUKOCYTE ESTERASE UR QL STRIP: NEGATIVE
LEUKOCYTE ESTERASE URINE, POC: ABNORMAL
LYMPHOCYTES # BLD AUTO: 0.5 K/UL
LYMPHOCYTES # BLD AUTO: 0.7 K/UL
LYMPHOCYTES # BLD AUTO: 0.8 K/UL
LYMPHOCYTES # BLD AUTO: 0.9 K/UL
LYMPHOCYTES # BLD AUTO: 0.9 K/UL
LYMPHOCYTES # BLD AUTO: 1 K/UL
LYMPHOCYTES # BLD AUTO: 1 K/UL
LYMPHOCYTES # BLD AUTO: ABNORMAL K/UL
LYMPHOCYTES # BLD AUTO: ABNORMAL K/UL
LYMPHOCYTES NFR BLD: 13.1 %
LYMPHOCYTES NFR BLD: 13.3 %
LYMPHOCYTES NFR BLD: 13.9 %
LYMPHOCYTES NFR BLD: 14 %
LYMPHOCYTES NFR BLD: 14.4 %
LYMPHOCYTES NFR BLD: 14.7 %
LYMPHOCYTES NFR BLD: 14.7 %
LYMPHOCYTES NFR BLD: 15.7 %
LYMPHOCYTES NFR BLD: 16.2 %
LYMPHOCYTES NFR BLD: 17.9 %
LYMPHOCYTES NFR BLD: 2 %
LYMPHOCYTES NFR BLD: 7.4 %
LYMPHOCYTES NFR FLD MANUAL: 74 %
MAGNESIUM SERPL-MCNC: 1.7 MG/DL
MAGNESIUM SERPL-MCNC: 1.7 MG/DL
MAGNESIUM SERPL-MCNC: 1.9 MG/DL
MAGNESIUM SERPL-MCNC: 2.1 MG/DL
MAGNESIUM SERPL-MCNC: 2.2 MG/DL
MCH RBC QN AUTO: 29.7 PG
MCH RBC QN AUTO: 30.1 PG
MCH RBC QN AUTO: 30.3 PG
MCH RBC QN AUTO: 30.5 PG
MCH RBC QN AUTO: 30.5 PG
MCH RBC QN AUTO: 30.6 PG
MCH RBC QN AUTO: 30.6 PG
MCH RBC QN AUTO: 30.8 PG
MCH RBC QN AUTO: 30.9 PG
MCH RBC QN AUTO: 31 PG
MCH RBC QN AUTO: 31.1 PG
MCH RBC QN AUTO: 31.3 PG
MCHC RBC AUTO-ENTMCNC: 32 G/DL
MCHC RBC AUTO-ENTMCNC: 32.3 G/DL
MCHC RBC AUTO-ENTMCNC: 32.3 G/DL
MCHC RBC AUTO-ENTMCNC: 33.2 %
MCHC RBC AUTO-ENTMCNC: 33.3 G/DL
MCHC RBC AUTO-ENTMCNC: 33.3 G/DL
MCHC RBC AUTO-ENTMCNC: 33.7 G/DL
MCHC RBC AUTO-ENTMCNC: 33.7 G/DL
MCHC RBC AUTO-ENTMCNC: 33.8 G/DL
MCHC RBC AUTO-ENTMCNC: 33.9 G/DL
MCHC RBC AUTO-ENTMCNC: 33.9 G/DL
MCHC RBC AUTO-ENTMCNC: 34.2 G/DL
MCV RBC AUTO: 90 FL
MCV RBC AUTO: 91 FL
MCV RBC AUTO: 92 FL
MCV RBC AUTO: 93 FL
MCV RBC AUTO: 94 FL
MCV RBC AUTO: 95 FL
MICROSCOPIC COMMENT: ABNORMAL
MITRAL VALVE MOBILITY: NORMAL
MITRAL VALVE REGURGITATION: ABNORMAL
MITRAL VALVE REGURGITATION: NORMAL
MONOCYTES # BLD AUTO: 0.4 K/UL
MONOCYTES # BLD AUTO: 0.5 K/UL
MONOCYTES # BLD AUTO: 0.6 K/UL
MONOCYTES # BLD AUTO: 0.7 K/UL
MONOCYTES # BLD AUTO: ABNORMAL K/UL
MONOCYTES # BLD AUTO: ABNORMAL K/UL
MONOCYTES NFR BLD: 10 %
MONOCYTES NFR BLD: 10.1 %
MONOCYTES NFR BLD: 10.3 %
MONOCYTES NFR BLD: 10.8 %
MONOCYTES NFR BLD: 10.9 %
MONOCYTES NFR BLD: 3 %
MONOCYTES NFR BLD: 5 %
MONOCYTES NFR BLD: 8.8 %
MONOCYTES NFR BLD: 9.1 %
MONOCYTES NFR BLD: 9.2 %
MONOCYTES NFR BLD: 9.6 %
MONOCYTES NFR BLD: 9.9 %
MONOS+MACROS NFR FLD MANUAL: 4 %
NEUTROPHILS # BLD AUTO: 3.1 K/UL
NEUTROPHILS # BLD AUTO: 3.1 K/UL
NEUTROPHILS # BLD AUTO: 3.7 K/UL
NEUTROPHILS # BLD AUTO: 3.9 K/UL
NEUTROPHILS # BLD AUTO: 4 K/UL
NEUTROPHILS # BLD AUTO: 4.2 K/UL
NEUTROPHILS # BLD AUTO: 4.9 K/UL
NEUTROPHILS # BLD AUTO: 4.9 K/UL
NEUTROPHILS # BLD AUTO: 5.1 K/UL
NEUTROPHILS # BLD AUTO: 5.5 K/UL
NEUTROPHILS NFR BLD: 68.4 %
NEUTROPHILS NFR BLD: 69 %
NEUTROPHILS NFR BLD: 70.7 %
NEUTROPHILS NFR BLD: 71.9 %
NEUTROPHILS NFR BLD: 71.9 %
NEUTROPHILS NFR BLD: 72.7 %
NEUTROPHILS NFR BLD: 73.6 %
NEUTROPHILS NFR BLD: 73.7 %
NEUTROPHILS NFR BLD: 74.1 %
NEUTROPHILS NFR BLD: 81 %
NEUTROPHILS NFR BLD: 82.3 %
NEUTROPHILS NFR BLD: 94 %
NEUTROPHILS NFR FLD MANUAL: 22 %
NITRITE UR QL STRIP: NEGATIVE
NITRITE UR QL STRIP: NEGATIVE
NITRITE, POC UA: NEGATIVE
NONHDLC SERPL-MCNC: 87 MG/DL
NRBC BLD-RTO: 0 /100 WBC
OVALOCYTES BLD QL SMEAR: ABNORMAL
OVALOCYTES BLD QL SMEAR: ABNORMAL
PARAINFLUENZA: NOT DETECTED
PH UR STRIP: 5 [PH] (ref 5–8)
PH UR STRIP: 5 [PH] (ref 5–8)
PH, POC UA: 5
PLATELET # BLD AUTO: 149 K/UL
PLATELET # BLD AUTO: 168 K/UL
PLATELET # BLD AUTO: 181 K/UL
PLATELET # BLD AUTO: 185 K/UL
PLATELET # BLD AUTO: 195 K/UL
PLATELET # BLD AUTO: 196 K/UL
PLATELET # BLD AUTO: 202 K/UL
PLATELET # BLD AUTO: 203 K/UL
PLATELET # BLD AUTO: 213 K/UL
PLATELET # BLD AUTO: 239 K/UL
PLATELET # BLD AUTO: 279 K/UL
PLATELET # BLD AUTO: 281 K/UL
PLATELET BLD QL SMEAR: ABNORMAL
PLATELET BLD QL SMEAR: ABNORMAL
PMV BLD AUTO: 10.1 FL
PMV BLD AUTO: 10.3 FL
PMV BLD AUTO: 10.4 FL
PMV BLD AUTO: 10.5 FL
PMV BLD AUTO: 10.6 FL
PMV BLD AUTO: 10.8 FL
PMV BLD AUTO: 10.9 FL
PMV BLD AUTO: 11.1 FL
PMV BLD AUTO: 9 FL
PMV BLD AUTO: 9.6 FL
POCT GLUCOSE: 127 MG/DL (ref 70–110)
POCT GLUCOSE: 173 MG/DL (ref 70–110)
POCT GLUCOSE: 177 MG/DL (ref 70–110)
POCT GLUCOSE: 184 MG/DL (ref 70–110)
POCT GLUCOSE: 195 MG/DL (ref 70–110)
POCT GLUCOSE: 219 MG/DL (ref 70–110)
POCT GLUCOSE: 235 MG/DL (ref 70–110)
POCT GLUCOSE: 257 MG/DL (ref 70–110)
POCT GLUCOSE: 276 MG/DL (ref 70–110)
POCT GLUCOSE: 286 MG/DL (ref 70–110)
POCT GLUCOSE: 288 MG/DL (ref 70–110)
POCT GLUCOSE: 294 MG/DL (ref 70–110)
POCT GLUCOSE: 317 MG/DL (ref 70–110)
POCT GLUCOSE: 320 MG/DL (ref 70–110)
POCT GLUCOSE: 334 MG/DL (ref 70–110)
POCT GLUCOSE: 342 MG/DL (ref 70–110)
POCT GLUCOSE: 373 MG/DL (ref 70–110)
POCT GLUCOSE: 376 MG/DL (ref 70–110)
POCT GLUCOSE: 407 MG/DL (ref 70–110)
POCT GLUCOSE: 414 MG/DL (ref 70–110)
POCT GLUCOSE: 448 MG/DL (ref 70–110)
POCT GLUCOSE: 82 MG/DL (ref 70–110)
POIKILOCYTOSIS BLD QL SMEAR: SLIGHT
POIKILOCYTOSIS BLD QL SMEAR: SLIGHT
POTASSIUM SERPL-SCNC: 4.1 MMOL/L
POTASSIUM SERPL-SCNC: 4.1 MMOL/L
POTASSIUM SERPL-SCNC: 4.2 MMOL/L
POTASSIUM SERPL-SCNC: 4.3 MMOL/L
POTASSIUM SERPL-SCNC: 4.4 MMOL/L
POTASSIUM SERPL-SCNC: 4.4 MMOL/L
POTASSIUM SERPL-SCNC: 4.5 MMOL/L
POTASSIUM SERPL-SCNC: 4.6 MMOL/L
POTASSIUM SERPL-SCNC: 5.4 MMOL/L
PROT FLD-MCNC: 2.6 G/DL
PROT SERPL-MCNC: 5.4 G/DL
PROT SERPL-MCNC: 5.5 G/DL
PROT SERPL-MCNC: 5.8 G/DL
PROT SERPL-MCNC: 5.9 G/DL
PROT SERPL-MCNC: 6.1 G/DL
PROT SERPL-MCNC: 6.4 G/DL
PROT SERPL-MCNC: 6.5 G/DL
PROT SERPL-MCNC: 6.7 G/DL
PROT SERPL-MCNC: 6.8 G/DL
PROT UR QL STRIP: NEGATIVE
PROT UR QL STRIP: NEGATIVE
PROT UR-MCNC: <7 MG/DL
PROT/CREAT RATIO, UR: NORMAL
PROTEIN, POC: ABNORMAL
RBC # BLD AUTO: 2.81 M/UL
RBC # BLD AUTO: 2.83 M/UL
RBC # BLD AUTO: 2.9 M/UL
RBC # BLD AUTO: 2.9 M/UL
RBC # BLD AUTO: 2.91 M/UL
RBC # BLD AUTO: 2.91 M/UL
RBC # BLD AUTO: 2.92 M/UL
RBC # BLD AUTO: 3.04 M/UL
RBC # BLD AUTO: 3.47 M/UL
RBC # BLD AUTO: 3.54 M/UL
RBC # BLD AUTO: 3.63 M/UL
RBC # BLD AUTO: 3.72 M/UL
RBC #/AREA URNS AUTO: 1 /HPF (ref 0–4)
RETIRED EF AND QEF - SEE NOTES: 55 (ref 55–65)
RETIRED EF AND QEF - SEE NOTES: 60 (ref 55–65)
RVP - ADENOVIRUS: NOT DETECTED
RVP - HUMAN METAPNEUMOVIRUS (HMPV): NOT DETECTED
RVP - INFLUENZA A: NOT DETECTED
RVP - INFLUENZA B: NOT DETECTED
RVP - RESPIRATORY SYNCTIAL VIRUS (RSV) A: NOT DETECTED
RVP - RESPIRATORY VIRAL PANEL, SOURCE: NORMAL
RVP - RHINOVIRUS: NOT DETECTED
S PYO RRNA THROAT QL PROBE: NEGATIVE
SERUM COLLECTION DT - LUMINEX CLASS I: NORMAL
SERUM COLLECTION DT - LUMINEX CLASS II: NORMAL
SODIUM SERPL-SCNC: 131 MMOL/L
SODIUM SERPL-SCNC: 132 MMOL/L
SODIUM SERPL-SCNC: 132 MMOL/L
SODIUM SERPL-SCNC: 134 MMOL/L
SODIUM SERPL-SCNC: 135 MMOL/L
SODIUM SERPL-SCNC: 135 MMOL/L
SODIUM SERPL-SCNC: 138 MMOL/L
SODIUM SERPL-SCNC: 139 MMOL/L
SODIUM SERPL-SCNC: 139 MMOL/L
SODIUM SERPL-SCNC: 140 MMOL/L
SODIUM UR-SCNC: <20 MMOL/L
SP GR UR STRIP: 1.01 (ref 1–1.03)
SP GR UR STRIP: 1.01 (ref 1–1.03)
SPECIFIC GRAVITY, POC UA: 1.01
SPECIMEN SOURCE: NORMAL
TACROLIMUS BLD-MCNC: 11 NG/ML
TACROLIMUS BLD-MCNC: 3.4 NG/ML
TACROLIMUS BLD-MCNC: 3.5 NG/ML
TACROLIMUS BLD-MCNC: 5.3 NG/ML
TACROLIMUS BLD-MCNC: 5.5 NG/ML
TACROLIMUS BLD-MCNC: 5.9 NG/ML
TACROLIMUS BLD-MCNC: 6.3 NG/ML
TACROLIMUS BLD-MCNC: 6.6 NG/ML
TACROLIMUS BLD-MCNC: 7.3 NG/ML
TACROLIMUS BLD-MCNC: 8.4 NG/ML
TRICUSPID VALVE REGURGITATION: ABNORMAL
TRICUSPID VALVE REGURGITATION: NORMAL
TRIGL SERPL-MCNC: 69 MG/DL
URN SPEC COLLECT METH UR: ABNORMAL
URN SPEC COLLECT METH UR: NORMAL
UROBILINOGEN UR STRIP-ACNC: NEGATIVE EU/DL
UROBILINOGEN UR STRIP-ACNC: NEGATIVE EU/DL
UROBILINOGEN, POC UA: ABNORMAL
UUN UR-MCNC: 413 MG/DL
WBC # BLD AUTO: 4.28 K/UL
WBC # BLD AUTO: 4.47 K/UL
WBC # BLD AUTO: 5.25 K/UL
WBC # BLD AUTO: 5.29 K/UL
WBC # BLD AUTO: 5.78 K/UL
WBC # BLD AUTO: 5.88 K/UL
WBC # BLD AUTO: 6.14 K/UL
WBC # BLD AUTO: 6.55 K/UL
WBC # BLD AUTO: 6.68 K/UL
WBC # BLD AUTO: 6.7 K/UL
WBC # BLD AUTO: 6.72 K/UL
WBC # BLD AUTO: 7.06 K/UL
WBC # FLD: 798 /CU MM
WBC #/AREA URNS AUTO: 42 /HPF (ref 0–5)
WBC CLUMPS UR QL AUTO: ABNORMAL

## 2017-01-01 PROCEDURE — C1729 CATH, DRAINAGE: HCPCS

## 2017-01-01 PROCEDURE — 25000003 PHARM REV CODE 250: Performed by: INTERNAL MEDICINE

## 2017-01-01 PROCEDURE — 85025 COMPLETE CBC W/AUTO DIFF WBC: CPT

## 2017-01-01 PROCEDURE — A4216 STERILE WATER/SALINE, 10 ML: HCPCS | Performed by: INTERNAL MEDICINE

## 2017-01-01 PROCEDURE — 36415 COLL VENOUS BLD VENIPUNCTURE: CPT | Mod: PO

## 2017-01-01 PROCEDURE — 87040 BLOOD CULTURE FOR BACTERIA: CPT | Mod: 59

## 2017-01-01 PROCEDURE — 63600175 PHARM REV CODE 636 W HCPCS: Performed by: INTERNAL MEDICINE

## 2017-01-01 PROCEDURE — 36415 COLL VENOUS BLD VENIPUNCTURE: CPT

## 2017-01-01 PROCEDURE — 80053 COMPREHEN METABOLIC PANEL: CPT

## 2017-01-01 PROCEDURE — 0W9G3ZZ DRAINAGE OF PERITONEAL CAVITY, PERCUTANEOUS APPROACH: ICD-10-PCS | Performed by: RADIOLOGY

## 2017-01-01 PROCEDURE — A7048 VACUUM DRAIN BOTTLE/TUBE KIT: HCPCS

## 2017-01-01 PROCEDURE — 99999 PR PBB SHADOW E&M-EST. PATIENT-LVL III: CPT | Mod: PBBFAC,,, | Performed by: INTERNAL MEDICINE

## 2017-01-01 PROCEDURE — 80197 ASSAY OF TACROLIMUS: CPT

## 2017-01-01 PROCEDURE — 83735 ASSAY OF MAGNESIUM: CPT

## 2017-01-01 PROCEDURE — 71020 XR CHEST PA AND LATERAL: CPT | Mod: 26,,, | Performed by: RADIOLOGY

## 2017-01-01 PROCEDURE — 99214 OFFICE O/P EST MOD 30 MIN: CPT | Mod: S$GLB,,, | Performed by: INTERNAL MEDICINE

## 2017-01-01 PROCEDURE — 77080 DXA BONE DENSITY AXIAL: CPT | Mod: 26,,, | Performed by: INTERNAL MEDICINE

## 2017-01-01 PROCEDURE — 84300 ASSAY OF URINE SODIUM: CPT

## 2017-01-01 PROCEDURE — 84157 ASSAY OF PROTEIN OTHER: CPT

## 2017-01-01 PROCEDURE — 87070 CULTURE OTHR SPECIMN AEROBIC: CPT

## 2017-01-01 PROCEDURE — 90471 IMMUNIZATION ADMIN: CPT | Mod: S$GLB,,, | Performed by: INTERNAL MEDICINE

## 2017-01-01 PROCEDURE — 99999 PR PBB SHADOW E&M-EST. PATIENT-LVL III: CPT | Mod: PBBFAC,,,

## 2017-01-01 PROCEDURE — 83880 ASSAY OF NATRIURETIC PEPTIDE: CPT

## 2017-01-01 PROCEDURE — 87088 URINE BACTERIA CULTURE: CPT

## 2017-01-01 PROCEDURE — 99999 PR PBB SHADOW E&M-EST. PATIENT-LVL IV: CPT | Mod: PBBFAC,,, | Performed by: INTERNAL MEDICINE

## 2017-01-01 PROCEDURE — 82042 OTHER SOURCE ALBUMIN QUAN EA: CPT

## 2017-01-01 PROCEDURE — 87400 INFLUENZA A/B EACH AG IA: CPT | Mod: 59

## 2017-01-01 PROCEDURE — 87086 URINE CULTURE/COLONY COUNT: CPT

## 2017-01-01 PROCEDURE — 20600001 HC STEP DOWN PRIVATE ROOM

## 2017-01-01 PROCEDURE — 86833 HLA CLASS II HIGH DEFIN QUAL: CPT

## 2017-01-01 PROCEDURE — 63600175 PHARM REV CODE 636 W HCPCS: Mod: PO | Performed by: INTERNAL MEDICINE

## 2017-01-01 PROCEDURE — 93000 ELECTROCARDIOGRAM COMPLETE: CPT | Mod: S$GLB,,, | Performed by: INTERNAL MEDICINE

## 2017-01-01 PROCEDURE — 84156 ASSAY OF PROTEIN URINE: CPT

## 2017-01-01 PROCEDURE — 87116 MYCOBACTERIA CULTURE: CPT

## 2017-01-01 PROCEDURE — 71020 XR CHEST PA AND LATERAL: CPT | Mod: TC,PO

## 2017-01-01 PROCEDURE — 86832 HLA CLASS I HIGH DEFIN QUAL: CPT

## 2017-01-01 PROCEDURE — 82945 GLUCOSE OTHER FLUID: CPT

## 2017-01-01 PROCEDURE — 25000003 PHARM REV CODE 250: Performed by: PHYSICIAN ASSISTANT

## 2017-01-01 PROCEDURE — 80061 LIPID PANEL: CPT

## 2017-01-01 PROCEDURE — 99203 OFFICE O/P NEW LOW 30 MIN: CPT | Mod: S$GLB,,, | Performed by: INTERNAL MEDICINE

## 2017-01-01 PROCEDURE — 81001 URINALYSIS AUTO W/SCOPE: CPT

## 2017-01-01 PROCEDURE — 90636 HEP A/HEP B VACC ADULT IM: CPT | Mod: S$GLB,,, | Performed by: INTERNAL MEDICINE

## 2017-01-01 PROCEDURE — 85025 COMPLETE CBC W/AUTO DIFF WBC: CPT | Mod: PO

## 2017-01-01 PROCEDURE — 86977 RBC SERUM PRETX INCUBJ/INHIB: CPT | Mod: 91

## 2017-01-01 PROCEDURE — 87186 SC STD MICRODIL/AGAR DIL: CPT | Mod: 59

## 2017-01-01 PROCEDURE — 87632 RESP VIRUS 6-11 TARGETS: CPT

## 2017-01-01 PROCEDURE — 89051 BODY FLUID CELL COUNT: CPT

## 2017-01-01 PROCEDURE — 87077 CULTURE AEROBIC IDENTIFY: CPT | Mod: 59

## 2017-01-01 PROCEDURE — 80048 BASIC METABOLIC PNL TOTAL CA: CPT

## 2017-01-01 PROCEDURE — 93351 STRESS TTE COMPLETE: CPT | Mod: S$GLB,,, | Performed by: INTERNAL MEDICINE

## 2017-01-01 PROCEDURE — 87205 SMEAR GRAM STAIN: CPT

## 2017-01-01 PROCEDURE — 85651 RBC SED RATE NONAUTOMATED: CPT

## 2017-01-01 PROCEDURE — 93010 ELECTROCARDIOGRAM REPORT: CPT | Mod: ,,, | Performed by: INTERNAL MEDICINE

## 2017-01-01 PROCEDURE — 87075 CULTR BACTERIA EXCEPT BLOOD: CPT

## 2017-01-01 PROCEDURE — 87102 FUNGUS ISOLATION CULTURE: CPT

## 2017-01-01 PROCEDURE — 99285 EMERGENCY DEPT VISIT HI MDM: CPT | Mod: 25

## 2017-01-01 PROCEDURE — 99231 SBSQ HOSP IP/OBS SF/LOW 25: CPT | Mod: GC,,, | Performed by: INTERNAL MEDICINE

## 2017-01-01 PROCEDURE — 3074F SYST BP LT 130 MM HG: CPT | Mod: S$GLB,,, | Performed by: INTERNAL MEDICINE

## 2017-01-01 PROCEDURE — 63600175 PHARM REV CODE 636 W HCPCS: Mod: PO,EC | Performed by: INTERNAL MEDICINE

## 2017-01-01 PROCEDURE — 96372 THER/PROPH/DIAG INJ SC/IM: CPT | Mod: PO

## 2017-01-01 PROCEDURE — 99214 OFFICE O/P EST MOD 30 MIN: CPT | Mod: 25,S$GLB,, | Performed by: INTERNAL MEDICINE

## 2017-01-01 PROCEDURE — 84311 SPECTROPHOTOMETRY: CPT

## 2017-01-01 PROCEDURE — 81003 URINALYSIS AUTO W/O SCOPE: CPT

## 2017-01-01 PROCEDURE — 0W993ZX DRAINAGE OF RIGHT PLEURAL CAVITY, PERCUTANEOUS APPROACH, DIAGNOSTIC: ICD-10-PCS | Performed by: INTERNAL MEDICINE

## 2017-01-01 PROCEDURE — 96365 THER/PROPH/DIAG IV INF INIT: CPT

## 2017-01-01 PROCEDURE — 82962 GLUCOSE BLOOD TEST: CPT

## 2017-01-01 PROCEDURE — 3008F BODY MASS INDEX DOCD: CPT | Mod: S$GLB,,, | Performed by: INTERNAL MEDICINE

## 2017-01-01 PROCEDURE — 99222 1ST HOSP IP/OBS MODERATE 55: CPT | Mod: GC,,, | Performed by: INTERNAL MEDICINE

## 2017-01-01 PROCEDURE — 99222 1ST HOSP IP/OBS MODERATE 55: CPT | Mod: ,,, | Performed by: INTERNAL MEDICINE

## 2017-01-01 PROCEDURE — 88305 TISSUE EXAM BY PATHOLOGIST: CPT | Mod: 26,,, | Performed by: PATHOLOGY

## 2017-01-01 PROCEDURE — 82150 ASSAY OF AMYLASE: CPT

## 2017-01-01 PROCEDURE — 87880 STREP A ASSAY W/OPTIC: CPT | Mod: QW,S$GLB,, | Performed by: INTERNAL MEDICINE

## 2017-01-01 PROCEDURE — 77080 DXA BONE DENSITY AXIAL: CPT | Mod: TC

## 2017-01-01 PROCEDURE — 85027 COMPLETE CBC AUTOMATED: CPT

## 2017-01-01 PROCEDURE — 71020 XR CHEST PA AND LATERAL: CPT | Mod: TC

## 2017-01-01 PROCEDURE — 93320 DOPPLER ECHO COMPLETE: CPT | Mod: S$GLB,,, | Performed by: INTERNAL MEDICINE

## 2017-01-01 PROCEDURE — 99233 SBSQ HOSP IP/OBS HIGH 50: CPT | Mod: ,,, | Performed by: INTERNAL MEDICINE

## 2017-01-01 PROCEDURE — 87210 SMEAR WET MOUNT SALINE/INK: CPT

## 2017-01-01 PROCEDURE — 3078F DIAST BP <80 MM HG: CPT | Mod: S$GLB,,, | Performed by: INTERNAL MEDICINE

## 2017-01-01 PROCEDURE — 99223 1ST HOSP IP/OBS HIGH 75: CPT | Mod: GC,,, | Performed by: INTERNAL MEDICINE

## 2017-01-01 PROCEDURE — 93306 TTE W/DOPPLER COMPLETE: CPT

## 2017-01-01 PROCEDURE — 99213 OFFICE O/P EST LOW 20 MIN: CPT | Mod: S$GLB,,, | Performed by: INTERNAL MEDICINE

## 2017-01-01 PROCEDURE — 84155 ASSAY OF PROTEIN SERUM: CPT

## 2017-01-01 PROCEDURE — 3075F SYST BP GE 130 - 139MM HG: CPT | Mod: S$GLB,,, | Performed by: INTERNAL MEDICINE

## 2017-01-01 PROCEDURE — 87449 NOS EACH ORGANISM AG IA: CPT

## 2017-01-01 PROCEDURE — 86832 HLA CLASS I HIGH DEFIN QUAL: CPT | Mod: 91

## 2017-01-01 PROCEDURE — 87804 INFLUENZA ASSAY W/OPTIC: CPT | Mod: QW,S$GLB,, | Performed by: INTERNAL MEDICINE

## 2017-01-01 PROCEDURE — 99999 PR PBB SHADOW E&M-EST. PATIENT-LVL V: CPT | Mod: PBBFAC,,,

## 2017-01-01 PROCEDURE — 85007 BL SMEAR W/DIFF WBC COUNT: CPT

## 2017-01-01 PROCEDURE — 86832 HLA CLASS I HIGH DEFIN QUAL: CPT | Mod: 91,PO,TXP

## 2017-01-01 PROCEDURE — 25000003 PHARM REV CODE 250: Performed by: EMERGENCY MEDICINE

## 2017-01-01 PROCEDURE — 93005 ELECTROCARDIOGRAM TRACING: CPT

## 2017-01-01 PROCEDURE — 93306 TTE W/DOPPLER COMPLETE: CPT | Mod: 26,,, | Performed by: INTERNAL MEDICINE

## 2017-01-01 PROCEDURE — 80053 COMPREHEN METABOLIC PANEL: CPT | Mod: PO

## 2017-01-01 PROCEDURE — 86833 HLA CLASS II HIGH DEFIN QUAL: CPT | Mod: 91

## 2017-01-01 PROCEDURE — 12000002 HC ACUTE/MED SURGE SEMI-PRIVATE ROOM

## 2017-01-01 PROCEDURE — 32555 ASPIRATE PLEURA W/ IMAGING: CPT

## 2017-01-01 PROCEDURE — 88305 TISSUE EXAM BY PATHOLOGIST: CPT | Performed by: PATHOLOGY

## 2017-01-01 PROCEDURE — 99499 UNLISTED E&M SERVICE: CPT | Mod: S$GLB,,, | Performed by: INTERNAL MEDICINE

## 2017-01-01 PROCEDURE — 83615 LACTATE (LD) (LDH) ENZYME: CPT

## 2017-01-01 PROCEDURE — 96367 TX/PROPH/DG ADDL SEQ IV INF: CPT

## 2017-01-01 PROCEDURE — 84540 ASSAY OF URINE/UREA-N: CPT

## 2017-01-01 PROCEDURE — 83615 LACTATE (LD) (LDH) ENZYME: CPT | Mod: 91

## 2017-01-01 PROCEDURE — 99215 OFFICE O/P EST HI 40 MIN: CPT | Mod: S$GLB,,, | Performed by: INTERNAL MEDICINE

## 2017-01-01 PROCEDURE — 99233 SBSQ HOSP IP/OBS HIGH 50: CPT | Mod: ,,, | Performed by: NURSE PRACTITIONER

## 2017-01-01 PROCEDURE — 90670 PCV13 VACCINE IM: CPT | Mod: S$GLB,,, | Performed by: INTERNAL MEDICINE

## 2017-01-01 PROCEDURE — 88112 CYTOPATH CELL ENHANCE TECH: CPT | Mod: 26,,, | Performed by: PATHOLOGY

## 2017-01-01 PROCEDURE — 96372 THER/PROPH/DIAG INJ SC/IM: CPT

## 2017-01-01 PROCEDURE — G0008 ADMIN INFLUENZA VIRUS VAC: HCPCS | Mod: S$GLB,,, | Performed by: INTERNAL MEDICINE

## 2017-01-01 PROCEDURE — 63600175 PHARM REV CODE 636 W HCPCS: Performed by: EMERGENCY MEDICINE

## 2017-01-01 PROCEDURE — 87186 SC STD MICRODIL/AGAR DIL: CPT

## 2017-01-01 PROCEDURE — G0009 ADMIN PNEUMOCOCCAL VACCINE: HCPCS | Mod: S$GLB,,, | Performed by: INTERNAL MEDICINE

## 2017-01-01 PROCEDURE — 99214 OFFICE O/P EST MOD 30 MIN: CPT | Mod: 25,S$GLB,,

## 2017-01-01 PROCEDURE — 96375 TX/PRO/DX INJ NEW DRUG ADDON: CPT

## 2017-01-01 PROCEDURE — 99238 HOSP IP/OBS DSCHRG MGMT 30/<: CPT | Mod: ,,, | Performed by: INTERNAL MEDICINE

## 2017-01-01 PROCEDURE — 93325 DOPPLER ECHO COLOR FLOW MAPG: CPT | Mod: S$GLB,,, | Performed by: INTERNAL MEDICINE

## 2017-01-01 PROCEDURE — 90662 IIV NO PRSV INCREASED AG IM: CPT | Mod: S$GLB,,, | Performed by: INTERNAL MEDICINE

## 2017-01-01 PROCEDURE — 81002 URINALYSIS NONAUTO W/O SCOPE: CPT | Mod: S$GLB,,,

## 2017-01-01 RX ORDER — TACROLIMUS 1 MG/1
3 CAPSULE ORAL EVERY MORNING
Status: DISCONTINUED | OUTPATIENT
Start: 2017-01-01 | End: 2017-01-01 | Stop reason: HOSPADM

## 2017-01-01 RX ORDER — TAMSULOSIN HYDROCHLORIDE 0.4 MG/1
CAPSULE ORAL
Qty: 60 CAPSULE | Refills: 0 | Status: SHIPPED | OUTPATIENT
Start: 2017-01-01 | End: 2017-01-01 | Stop reason: SDUPTHER

## 2017-01-01 RX ORDER — OSELTAMIVIR PHOSPHATE 6 MG/ML
30 FOR SUSPENSION ORAL 2 TIMES DAILY
Status: DISCONTINUED | OUTPATIENT
Start: 2017-01-01 | End: 2017-01-01

## 2017-01-01 RX ORDER — NOREPINEPHRINE BITARTRATE/D5W 4MG/250ML
0.05 PLASTIC BAG, INJECTION (ML) INTRAVENOUS CONTINUOUS
Status: DISCONTINUED | OUTPATIENT
Start: 2017-01-01 | End: 2017-01-01

## 2017-01-01 RX ORDER — TACROLIMUS 1 MG/1
2 CAPSULE ORAL EVERY MORNING
Status: DISCONTINUED | OUTPATIENT
Start: 2017-01-01 | End: 2017-01-01 | Stop reason: HOSPADM

## 2017-01-01 RX ORDER — ASPIRIN 81 MG/1
81 TABLET ORAL DAILY
Status: DISCONTINUED | OUTPATIENT
Start: 2017-01-01 | End: 2017-01-01 | Stop reason: HOSPADM

## 2017-01-01 RX ORDER — TACROLIMUS 1 MG/1
3 CAPSULE ORAL 2 TIMES DAILY
Status: DISCONTINUED | OUTPATIENT
Start: 2017-01-01 | End: 2017-01-01

## 2017-01-01 RX ORDER — PROMETHAZINE HYDROCHLORIDE AND CODEINE PHOSPHATE 6.25; 1 MG/5ML; MG/5ML
5 SOLUTION ORAL EVERY 6 HOURS PRN
Status: DISCONTINUED | OUTPATIENT
Start: 2017-01-01 | End: 2017-01-01 | Stop reason: HOSPADM

## 2017-01-01 RX ORDER — AZITHROMYCIN 250 MG/1
TABLET, FILM COATED ORAL
Qty: 6 TABLET | Refills: 0 | Status: SHIPPED | OUTPATIENT
Start: 2017-01-01 | End: 2017-01-01

## 2017-01-01 RX ORDER — LEVOFLOXACIN 500 MG/1
500 TABLET, FILM COATED ORAL DAILY
Qty: 2 TABLET | Refills: 0 | Status: SHIPPED | OUTPATIENT
Start: 2017-01-01 | End: 2017-01-01 | Stop reason: ALTCHOICE

## 2017-01-01 RX ORDER — GABAPENTIN 300 MG/1
900 CAPSULE ORAL 3 TIMES DAILY
Status: DISCONTINUED | OUTPATIENT
Start: 2017-01-01 | End: 2017-01-01 | Stop reason: HOSPADM

## 2017-01-01 RX ORDER — ALBUTEROL SULFATE 90 UG/1
2 AEROSOL, METERED RESPIRATORY (INHALATION) EVERY 6 HOURS PRN
Qty: 18 G | Refills: 0 | Status: SHIPPED | OUTPATIENT
Start: 2017-01-01 | End: 2018-01-01 | Stop reason: CLARIF

## 2017-01-01 RX ORDER — TAMSULOSIN HYDROCHLORIDE 0.4 MG/1
CAPSULE ORAL
Qty: 60 CAPSULE | Refills: 1 | Status: SHIPPED | OUTPATIENT
Start: 2017-01-01 | End: 2017-01-01 | Stop reason: SDUPTHER

## 2017-01-01 RX ORDER — GLUCAGON 1 MG
1 KIT INJECTION
Status: DISCONTINUED | OUTPATIENT
Start: 2017-01-01 | End: 2017-01-01 | Stop reason: HOSPADM

## 2017-01-01 RX ORDER — TACROLIMUS 1 MG/1
1 CAPSULE ORAL ONCE
Status: COMPLETED | OUTPATIENT
Start: 2017-01-01 | End: 2017-01-01

## 2017-01-01 RX ORDER — ESCITALOPRAM OXALATE 20 MG/1
20 TABLET ORAL DAILY
Status: DISCONTINUED | OUTPATIENT
Start: 2017-01-01 | End: 2017-01-01 | Stop reason: HOSPADM

## 2017-01-01 RX ORDER — IBUPROFEN 200 MG
16 TABLET ORAL
Status: DISCONTINUED | OUTPATIENT
Start: 2017-01-01 | End: 2017-01-01 | Stop reason: HOSPADM

## 2017-01-01 RX ORDER — CODEINE PHOSPHATE AND GUAIFENESIN 10; 100 MG/5ML; MG/5ML
5 SOLUTION ORAL 3 TIMES DAILY PRN
Qty: 118 ML | Refills: 0 | Status: ON HOLD | OUTPATIENT
Start: 2017-01-01 | End: 2017-01-01 | Stop reason: HOSPADM

## 2017-01-01 RX ORDER — INSULIN ASPART 100 [IU]/ML
5 INJECTION, SOLUTION INTRAVENOUS; SUBCUTANEOUS
Status: DISCONTINUED | OUTPATIENT
Start: 2017-01-01 | End: 2017-01-01 | Stop reason: HOSPADM

## 2017-01-01 RX ORDER — CEFEPIME HYDROCHLORIDE 1 G/50ML
1 INJECTION, SOLUTION INTRAVENOUS
Status: DISCONTINUED | OUTPATIENT
Start: 2017-01-01 | End: 2017-01-01 | Stop reason: HOSPADM

## 2017-01-01 RX ORDER — ONDANSETRON 8 MG/1
8 TABLET, ORALLY DISINTEGRATING ORAL EVERY 8 HOURS PRN
Status: DISCONTINUED | OUTPATIENT
Start: 2017-01-01 | End: 2017-01-01 | Stop reason: HOSPADM

## 2017-01-01 RX ORDER — PANTOPRAZOLE SODIUM 40 MG/1
40 TABLET, DELAYED RELEASE ORAL DAILY
Status: DISCONTINUED | OUTPATIENT
Start: 2017-01-01 | End: 2017-01-01 | Stop reason: HOSPADM

## 2017-01-01 RX ORDER — VANCOMYCIN HCL IN 5 % DEXTROSE 1.25 G/25
15 PLASTIC BAG, INJECTION (ML) INTRAVENOUS
Status: COMPLETED | OUTPATIENT
Start: 2017-01-01 | End: 2017-01-01

## 2017-01-01 RX ORDER — INSULIN ASPART 100 [IU]/ML
0-5 INJECTION, SOLUTION INTRAVENOUS; SUBCUTANEOUS
Status: DISCONTINUED | OUTPATIENT
Start: 2017-01-01 | End: 2017-01-01 | Stop reason: HOSPADM

## 2017-01-01 RX ORDER — PRAVASTATIN SODIUM 20 MG/1
40 TABLET ORAL NIGHTLY
Status: DISCONTINUED | OUTPATIENT
Start: 2017-01-01 | End: 2017-01-01 | Stop reason: HOSPADM

## 2017-01-01 RX ORDER — PREDNISONE 2.5 MG/1
2.5 TABLET ORAL DAILY
Status: DISCONTINUED | OUTPATIENT
Start: 2017-01-01 | End: 2017-01-01 | Stop reason: HOSPADM

## 2017-01-01 RX ORDER — SODIUM CHLORIDE 0.9 % (FLUSH) 0.9 %
3 SYRINGE (ML) INJECTION EVERY 8 HOURS
Status: DISCONTINUED | OUTPATIENT
Start: 2017-01-01 | End: 2017-01-01 | Stop reason: HOSPADM

## 2017-01-01 RX ORDER — ALBUTEROL SULFATE 90 UG/1
2 AEROSOL, METERED RESPIRATORY (INHALATION) EVERY 6 HOURS PRN
Status: DISCONTINUED | OUTPATIENT
Start: 2017-01-01 | End: 2017-01-01 | Stop reason: HOSPADM

## 2017-01-01 RX ORDER — FLUTICASONE PROPIONATE 50 MCG
2 SPRAY, SUSPENSION (ML) NASAL DAILY
Status: DISCONTINUED | OUTPATIENT
Start: 2017-01-01 | End: 2017-01-01 | Stop reason: HOSPADM

## 2017-01-01 RX ORDER — GUAIFENESIN 600 MG/1
600 TABLET, EXTENDED RELEASE ORAL 2 TIMES DAILY
Status: DISCONTINUED | OUTPATIENT
Start: 2017-01-01 | End: 2017-01-01 | Stop reason: HOSPADM

## 2017-01-01 RX ORDER — TORSEMIDE 20 MG/1
40 TABLET ORAL DAILY
Status: DISCONTINUED | OUTPATIENT
Start: 2017-01-01 | End: 2017-01-01

## 2017-01-01 RX ORDER — SODIUM CHLORIDE 9 MG/ML
INJECTION, SOLUTION INTRAVENOUS CONTINUOUS
Status: ACTIVE | OUTPATIENT
Start: 2017-01-01 | End: 2017-01-01

## 2017-01-01 RX ORDER — TAMSULOSIN HYDROCHLORIDE 0.4 MG/1
CAPSULE ORAL
Qty: 60 CAPSULE | Refills: 1 | Status: SHIPPED | OUTPATIENT
Start: 2017-01-01 | End: 2018-01-01 | Stop reason: SDUPTHER

## 2017-01-01 RX ORDER — LEVOTHYROXINE SODIUM 150 UG/1
150 TABLET ORAL
Status: DISCONTINUED | OUTPATIENT
Start: 2017-01-01 | End: 2017-01-01 | Stop reason: HOSPADM

## 2017-01-01 RX ORDER — HEPARIN SODIUM 5000 [USP'U]/ML
5000 INJECTION, SOLUTION INTRAVENOUS; SUBCUTANEOUS EVERY 8 HOURS
Status: DISCONTINUED | OUTPATIENT
Start: 2017-01-01 | End: 2017-01-01 | Stop reason: HOSPADM

## 2017-01-01 RX ORDER — ONDANSETRON 4 MG/1
8 TABLET, ORALLY DISINTEGRATING ORAL EVERY 8 HOURS PRN
Qty: 15 TABLET | Refills: 0 | Status: ON HOLD | OUTPATIENT
Start: 2017-01-01 | End: 2018-01-01

## 2017-01-01 RX ORDER — TACROLIMUS 1 MG/1
2 CAPSULE ORAL 2 TIMES DAILY
Status: DISCONTINUED | OUTPATIENT
Start: 2017-01-01 | End: 2017-01-01

## 2017-01-01 RX ORDER — NORTRIPTYLINE HYDROCHLORIDE 25 MG/1
25 CAPSULE ORAL NIGHTLY
Status: DISCONTINUED | OUTPATIENT
Start: 2017-01-01 | End: 2017-01-01 | Stop reason: HOSPADM

## 2017-01-01 RX ORDER — IBUPROFEN 200 MG
24 TABLET ORAL
Status: DISCONTINUED | OUTPATIENT
Start: 2017-01-01 | End: 2017-01-01 | Stop reason: HOSPADM

## 2017-01-01 RX ORDER — TACROLIMUS 1 MG/1
CAPSULE ORAL
Qty: 180 CAPSULE | Refills: 11 | Status: ON HOLD | OUTPATIENT
Start: 2017-01-01 | End: 2018-01-01

## 2017-01-01 RX ORDER — LANOLIN ALCOHOL/MO/W.PET/CERES
400 CREAM (GRAM) TOPICAL DAILY
Status: DISCONTINUED | OUTPATIENT
Start: 2017-01-01 | End: 2017-01-01 | Stop reason: HOSPADM

## 2017-01-01 RX ORDER — MYCOPHENOLIC ACID 180 MG/1
720 TABLET, DELAYED RELEASE ORAL 2 TIMES DAILY
Status: DISCONTINUED | OUTPATIENT
Start: 2017-01-01 | End: 2017-01-01 | Stop reason: HOSPADM

## 2017-01-01 RX ORDER — CODEINE PHOSPHATE AND GUAIFENESIN 10; 100 MG/5ML; MG/5ML
5 SOLUTION ORAL 3 TIMES DAILY PRN
Status: DISCONTINUED | OUTPATIENT
Start: 2017-01-01 | End: 2017-01-01

## 2017-01-01 RX ORDER — LIDOCAINE HYDROCHLORIDE 10 MG/ML
10 INJECTION INFILTRATION; PERINEURAL ONCE
Status: DISCONTINUED | OUTPATIENT
Start: 2017-01-01 | End: 2017-01-01 | Stop reason: HOSPADM

## 2017-01-01 RX ORDER — TAMSULOSIN HYDROCHLORIDE 0.4 MG/1
0.4 CAPSULE ORAL DAILY
Status: DISCONTINUED | OUTPATIENT
Start: 2017-01-01 | End: 2017-01-01 | Stop reason: HOSPADM

## 2017-01-01 RX ADMIN — MYCOPHENILIC ACID 720 MG: 180 TABLET, DELAYED RELEASE ORAL at 09:12

## 2017-01-01 RX ADMIN — HEPARIN SODIUM 5000 UNITS: 5000 INJECTION, SOLUTION INTRAVENOUS; SUBCUTANEOUS at 01:12

## 2017-01-01 RX ADMIN — ESCITALOPRAM 20 MG: 20 TABLET, FILM COATED ORAL at 08:12

## 2017-01-01 RX ADMIN — SODIUM CHLORIDE: 0.9 INJECTION, SOLUTION INTRAVENOUS at 05:12

## 2017-01-01 RX ADMIN — INSULIN ASPART 5 UNITS: 100 INJECTION, SOLUTION INTRAVENOUS; SUBCUTANEOUS at 12:12

## 2017-01-01 RX ADMIN — ESCITALOPRAM 20 MG: 20 TABLET, FILM COATED ORAL at 09:12

## 2017-01-01 RX ADMIN — HEPARIN SODIUM 5000 UNITS: 5000 INJECTION, SOLUTION INTRAVENOUS; SUBCUTANEOUS at 02:12

## 2017-01-01 RX ADMIN — MAGNESIUM OXIDE TAB 400 MG (241.3 MG ELEMENTAL MG) 400 MG: 400 (241.3 MG) TAB at 08:12

## 2017-01-01 RX ADMIN — Medication 3 ML: at 09:12

## 2017-01-01 RX ADMIN — INSULIN ASPART 5 UNITS: 100 INJECTION, SOLUTION INTRAVENOUS; SUBCUTANEOUS at 11:12

## 2017-01-01 RX ADMIN — LEVOTHYROXINE SODIUM 150 MCG: 150 TABLET ORAL at 05:12

## 2017-01-01 RX ADMIN — ASPIRIN 81 MG: 81 TABLET, COATED ORAL at 10:12

## 2017-01-01 RX ADMIN — GUAIFENESIN 600 MG: 600 TABLET, EXTENDED RELEASE ORAL at 09:12

## 2017-01-01 RX ADMIN — ERYTHROPOIETIN 20000 UNITS: 20000 INJECTION, SOLUTION INTRAVENOUS; SUBCUTANEOUS at 11:07

## 2017-01-01 RX ADMIN — INSULIN ASPART 3 UNITS: 100 INJECTION, SOLUTION INTRAVENOUS; SUBCUTANEOUS at 05:12

## 2017-01-01 RX ADMIN — ESCITALOPRAM 20 MG: 20 TABLET, FILM COATED ORAL at 10:12

## 2017-01-01 RX ADMIN — GABAPENTIN 900 MG: 300 CAPSULE ORAL at 06:12

## 2017-01-01 RX ADMIN — INSULIN ASPART 3 UNITS: 100 INJECTION, SOLUTION INTRAVENOUS; SUBCUTANEOUS at 07:12

## 2017-01-01 RX ADMIN — PREDNISONE 2.5 MG: 2.5 TABLET ORAL at 08:12

## 2017-01-01 RX ADMIN — CEFEPIME HYDROCHLORIDE 1 G: 1 INJECTION, SOLUTION INTRAVENOUS at 11:12

## 2017-01-01 RX ADMIN — CEFEPIME HYDROCHLORIDE 1 G: 1 INJECTION, SOLUTION INTRAVENOUS at 08:12

## 2017-01-01 RX ADMIN — TACROLIMUS 3 MG: 1 CAPSULE ORAL at 10:12

## 2017-01-01 RX ADMIN — AZITHROMYCIN MONOHYDRATE 500 MG: 500 INJECTION, POWDER, LYOPHILIZED, FOR SOLUTION INTRAVENOUS at 06:12

## 2017-01-01 RX ADMIN — PANTOPRAZOLE SODIUM 40 MG: 40 TABLET, DELAYED RELEASE ORAL at 08:12

## 2017-01-01 RX ADMIN — HEPARIN SODIUM 5000 UNITS: 5000 INJECTION, SOLUTION INTRAVENOUS; SUBCUTANEOUS at 05:12

## 2017-01-01 RX ADMIN — GABAPENTIN 900 MG: 300 CAPSULE ORAL at 02:12

## 2017-01-01 RX ADMIN — GUAIFENESIN 600 MG: 600 TABLET, EXTENDED RELEASE ORAL at 08:12

## 2017-01-01 RX ADMIN — LEVOTHYROXINE SODIUM 150 MCG: 150 TABLET ORAL at 06:12

## 2017-01-01 RX ADMIN — INSULIN ASPART 3 UNITS: 100 INJECTION, SOLUTION INTRAVENOUS; SUBCUTANEOUS at 12:12

## 2017-01-01 RX ADMIN — TORSEMIDE 40 MG: 20 TABLET ORAL at 09:12

## 2017-01-01 RX ADMIN — TAMSULOSIN HYDROCHLORIDE 0.4 MG: 0.4 CAPSULE ORAL at 09:12

## 2017-01-01 RX ADMIN — Medication 3 ML: at 06:12

## 2017-01-01 RX ADMIN — PRAVASTATIN SODIUM 40 MG: 20 TABLET ORAL at 09:12

## 2017-01-01 RX ADMIN — TACROLIMUS 2 MG: 1 CAPSULE ORAL at 08:12

## 2017-01-01 RX ADMIN — INSULIN DETEMIR 20 UNITS: 100 INJECTION, SOLUTION SUBCUTANEOUS at 09:12

## 2017-01-01 RX ADMIN — Medication 3 ML: at 10:12

## 2017-01-01 RX ADMIN — GABAPENTIN 900 MG: 300 CAPSULE ORAL at 05:12

## 2017-01-01 RX ADMIN — CEFEPIME HYDROCHLORIDE 1 G: 1 INJECTION, SOLUTION INTRAVENOUS at 10:12

## 2017-01-01 RX ADMIN — MYCOPHENILIC ACID 720 MG: 180 TABLET, DELAYED RELEASE ORAL at 08:12

## 2017-01-01 RX ADMIN — NORTRIPTYLINE HYDROCHLORIDE 25 MG: 25 CAPSULE ORAL at 09:12

## 2017-01-01 RX ADMIN — GABAPENTIN 900 MG: 300 CAPSULE ORAL at 03:12

## 2017-01-01 RX ADMIN — CEFEPIME HYDROCHLORIDE 1 G: 1 INJECTION, SOLUTION INTRAVENOUS at 09:12

## 2017-01-01 RX ADMIN — PREDNISONE 2.5 MG: 2.5 TABLET ORAL at 10:12

## 2017-01-01 RX ADMIN — INSULIN ASPART 4 UNITS: 100 INJECTION, SOLUTION INTRAVENOUS; SUBCUTANEOUS at 09:12

## 2017-01-01 RX ADMIN — GABAPENTIN 900 MG: 300 CAPSULE ORAL at 01:12

## 2017-01-01 RX ADMIN — ASPIRIN 81 MG: 81 TABLET, COATED ORAL at 09:12

## 2017-01-01 RX ADMIN — HEPARIN SODIUM 5000 UNITS: 5000 INJECTION, SOLUTION INTRAVENOUS; SUBCUTANEOUS at 06:12

## 2017-01-01 RX ADMIN — ASPIRIN 81 MG: 81 TABLET, COATED ORAL at 08:12

## 2017-01-01 RX ADMIN — OSELTAMIVIR PHOSPHATE 30 MG: 6 POWDER, FOR SUSPENSION ORAL at 09:12

## 2017-01-01 RX ADMIN — Medication 3 ML: at 05:12

## 2017-01-01 RX ADMIN — GABAPENTIN 900 MG: 300 CAPSULE ORAL at 09:12

## 2017-01-01 RX ADMIN — OSELTAMIVIR PHOSPHATE 30 MG: 6 POWDER, FOR SUSPENSION ORAL at 11:12

## 2017-01-01 RX ADMIN — GABAPENTIN 900 MG: 300 CAPSULE ORAL at 10:12

## 2017-01-01 RX ADMIN — HEPARIN SODIUM 5000 UNITS: 5000 INJECTION, SOLUTION INTRAVENOUS; SUBCUTANEOUS at 09:12

## 2017-01-01 RX ADMIN — MYCOPHENILIC ACID 720 MG: 180 TABLET, DELAYED RELEASE ORAL at 12:12

## 2017-01-01 RX ADMIN — MAGNESIUM OXIDE TAB 400 MG (241.3 MG ELEMENTAL MG) 400 MG: 400 (241.3 MG) TAB at 09:12

## 2017-01-01 RX ADMIN — INSULIN ASPART 5 UNITS: 100 INJECTION, SOLUTION INTRAVENOUS; SUBCUTANEOUS at 07:12

## 2017-01-01 RX ADMIN — TAMSULOSIN HYDROCHLORIDE 0.4 MG: 0.4 CAPSULE ORAL at 10:12

## 2017-01-01 RX ADMIN — INSULIN ASPART 5 UNITS: 100 INJECTION, SOLUTION INTRAVENOUS; SUBCUTANEOUS at 09:12

## 2017-01-01 RX ADMIN — VANCOMYCIN HYDROCHLORIDE 1250 MG: 10 INJECTION, POWDER, LYOPHILIZED, FOR SOLUTION INTRAVENOUS at 10:12

## 2017-01-01 RX ADMIN — TAMSULOSIN HYDROCHLORIDE 0.4 MG: 0.4 CAPSULE ORAL at 08:12

## 2017-01-01 RX ADMIN — PANTOPRAZOLE SODIUM 40 MG: 40 TABLET, DELAYED RELEASE ORAL at 10:12

## 2017-01-01 RX ADMIN — Medication 3 ML: at 02:12

## 2017-01-01 RX ADMIN — TACROLIMUS 3 MG: 1 CAPSULE ORAL at 09:12

## 2017-01-01 RX ADMIN — Medication 3 ML: at 01:12

## 2017-01-01 RX ADMIN — GUAIFENESIN 600 MG: 600 TABLET, EXTENDED RELEASE ORAL at 12:12

## 2017-01-01 RX ADMIN — ONDANSETRON 8 MG: 8 TABLET, ORALLY DISINTEGRATING ORAL at 07:12

## 2017-01-01 RX ADMIN — PROMETHAZINE HYDROCHLORIDE AND CODEINE PHOSPHATE 5 ML: 10; 6.25 SOLUTION ORAL at 11:12

## 2017-01-01 RX ADMIN — ONDANSETRON 8 MG: 8 TABLET, ORALLY DISINTEGRATING ORAL at 05:12

## 2017-01-01 RX ADMIN — INSULIN ASPART 4 UNITS: 100 INJECTION, SOLUTION INTRAVENOUS; SUBCUTANEOUS at 05:12

## 2017-01-01 RX ADMIN — INSULIN ASPART 2 UNITS: 100 INJECTION, SOLUTION INTRAVENOUS; SUBCUTANEOUS at 09:12

## 2017-01-01 RX ADMIN — TACROLIMUS 1 MG: 1 CAPSULE ORAL at 05:12

## 2017-01-01 RX ADMIN — ERYTHROPOIETIN 20000 UNITS: 20000 INJECTION, SOLUTION INTRAVENOUS; SUBCUTANEOUS at 10:07

## 2017-01-01 RX ADMIN — INSULIN ASPART 5 UNITS: 100 INJECTION, SOLUTION INTRAVENOUS; SUBCUTANEOUS at 05:12

## 2017-01-01 RX ADMIN — PREDNISONE 2.5 MG: 2.5 TABLET ORAL at 09:12

## 2017-01-01 RX ADMIN — INSULIN ASPART 4 UNITS: 100 INJECTION, SOLUTION INTRAVENOUS; SUBCUTANEOUS at 06:12

## 2017-01-01 RX ADMIN — HEPARIN SODIUM 5000 UNITS: 5000 INJECTION, SOLUTION INTRAVENOUS; SUBCUTANEOUS at 03:12

## 2017-01-01 RX ADMIN — INSULIN ASPART 5 UNITS: 100 INJECTION, SOLUTION INTRAVENOUS; SUBCUTANEOUS at 08:12

## 2017-01-01 RX ADMIN — SODIUM CHLORIDE: 0.9 INJECTION, SOLUTION INTRAVENOUS at 11:12

## 2017-01-01 RX ADMIN — MAGNESIUM OXIDE TAB 400 MG (241.3 MG ELEMENTAL MG) 400 MG: 400 (241.3 MG) TAB at 10:12

## 2017-01-01 RX ADMIN — HEPARIN SODIUM 5000 UNITS: 5000 INJECTION, SOLUTION INTRAVENOUS; SUBCUTANEOUS at 10:12

## 2017-01-01 RX ADMIN — ERYTHROPOIETIN 20000 UNITS: 20000 INJECTION, SOLUTION INTRAVENOUS; SUBCUTANEOUS at 10:08

## 2017-01-01 RX ADMIN — TACROLIMUS 3 MG: 1 CAPSULE ORAL at 06:12

## 2017-01-01 RX ADMIN — PANTOPRAZOLE SODIUM 40 MG: 40 TABLET, DELAYED RELEASE ORAL at 09:12

## 2017-01-01 RX ADMIN — PRAVASTATIN SODIUM 40 MG: 20 TABLET ORAL at 10:12

## 2017-01-01 RX ADMIN — ERYTHROPOIETIN 20000 UNITS: 20000 INJECTION, SOLUTION INTRAVENOUS; SUBCUTANEOUS at 03:07

## 2017-01-01 RX ADMIN — MYCOPHENILIC ACID 720 MG: 180 TABLET, DELAYED RELEASE ORAL at 10:12

## 2017-01-01 RX ADMIN — INSULIN DETEMIR 20 UNITS: 100 INJECTION, SOLUTION SUBCUTANEOUS at 10:12

## 2017-01-01 RX ADMIN — TORSEMIDE 40 MG: 20 TABLET ORAL at 10:12

## 2017-01-03 ENCOUNTER — PATIENT MESSAGE (OUTPATIENT)
Dept: TRANSPLANT | Facility: CLINIC | Age: 42
End: 2017-01-03

## 2017-01-03 ENCOUNTER — LAB VISIT (OUTPATIENT)
Dept: LAB | Facility: HOSPITAL | Age: 42
End: 2017-01-03
Attending: INTERNAL MEDICINE
Payer: MEDICARE

## 2017-01-03 DIAGNOSIS — T86.290 VASCULOPATHY OF CARDIAC ALLOGRAFT: ICD-10-CM

## 2017-01-03 DIAGNOSIS — T86.20 COMPLICATION OF HEART TRANSPLANT, UNSPECIFIED COMPLICATION: ICD-10-CM

## 2017-01-03 DIAGNOSIS — Z79.899 ENCOUNTER FOR LONG-TERM (CURRENT) USE OF MEDICATIONS: ICD-10-CM

## 2017-01-03 DIAGNOSIS — Z79.52 LONG TERM CURRENT USE OF SYSTEMIC STEROIDS: ICD-10-CM

## 2017-01-03 DIAGNOSIS — Z94.1 STATUS POST HEART TRANSPLANT: ICD-10-CM

## 2017-01-03 DIAGNOSIS — I15.9 SECONDARY HYPERTENSION, UNSPECIFIED: ICD-10-CM

## 2017-01-03 DIAGNOSIS — R06.02 SHORTNESS OF BREATH: ICD-10-CM

## 2017-01-03 DIAGNOSIS — E78.2 MIXED HYPERLIPIDEMIA: ICD-10-CM

## 2017-01-03 DIAGNOSIS — N28.9 RENAL INSUFFICIENCY: ICD-10-CM

## 2017-01-03 LAB
ALBUMIN SERPL BCP-MCNC: 3.5 G/DL
ALP SERPL-CCNC: 155 U/L
ALT SERPL W/O P-5'-P-CCNC: 19 U/L
ANION GAP SERPL CALC-SCNC: 9 MMOL/L
AST SERPL-CCNC: 23 U/L
BILIRUB SERPL-MCNC: 0.6 MG/DL
BNP SERPL-MCNC: 178 PG/ML
BUN SERPL-MCNC: 35 MG/DL
CALCIUM SERPL-MCNC: 9 MG/DL
CHLORIDE SERPL-SCNC: 101 MMOL/L
CO2 SERPL-SCNC: 28 MMOL/L
CREAT SERPL-MCNC: 2.8 MG/DL
EST. GFR  (AFRICAN AMERICAN): 30.6 ML/MIN/1.73 M^2
EST. GFR  (NON AFRICAN AMERICAN): 26.4 ML/MIN/1.73 M^2
GLUCOSE SERPL-MCNC: 126 MG/DL
POTASSIUM SERPL-SCNC: 4.6 MMOL/L
PROT SERPL-MCNC: 6.6 G/DL
SODIUM SERPL-SCNC: 138 MMOL/L

## 2017-01-03 PROCEDURE — 83880 ASSAY OF NATRIURETIC PEPTIDE: CPT

## 2017-01-03 PROCEDURE — 80053 COMPREHEN METABOLIC PANEL: CPT

## 2017-01-03 PROCEDURE — 80197 ASSAY OF TACROLIMUS: CPT

## 2017-01-03 PROCEDURE — 36415 COLL VENOUS BLD VENIPUNCTURE: CPT | Mod: PO

## 2017-01-04 LAB — TACROLIMUS BLD-MCNC: 4.7 NG/ML

## 2017-01-06 ENCOUNTER — TELEPHONE (OUTPATIENT)
Dept: HEMATOLOGY/ONCOLOGY | Facility: CLINIC | Age: 42
End: 2017-01-06

## 2017-01-06 NOTE — TELEPHONE ENCOUNTER
----- Message from Hilda Melgoza sent at 1/5/2017 12:47 PM CST -----  Good Afternoon,    This patient is scheduled for tomorrow but we do not have updated insurance information.  Medicare states this patient does not have straight Medicare but a Medicare Advantage plan.  We believe it is [facility] Medicare Advantage but we need the ID#.  Please advise.     Thank you,  Hilda Melgoza  Pre Service  [id number]

## 2017-01-09 ENCOUNTER — INFUSION (OUTPATIENT)
Dept: INFUSION THERAPY | Facility: HOSPITAL | Age: 42
End: 2017-01-09
Attending: INTERNAL MEDICINE
Payer: MEDICARE

## 2017-01-09 VITALS — SYSTOLIC BLOOD PRESSURE: 100 MMHG | HEART RATE: 100 BPM | TEMPERATURE: 98 F | DIASTOLIC BLOOD PRESSURE: 63 MMHG

## 2017-01-09 DIAGNOSIS — D63.1 ANEMIA IN CHRONIC KIDNEY DISEASE: Primary | ICD-10-CM

## 2017-01-09 DIAGNOSIS — N18.9 ANEMIA IN CHRONIC KIDNEY DISEASE: Primary | ICD-10-CM

## 2017-01-09 DIAGNOSIS — D63.1 ANEMIA OF CHRONIC RENAL FAILURE, STAGE 3 (MODERATE): ICD-10-CM

## 2017-01-09 DIAGNOSIS — N18.30 ANEMIA OF CHRONIC RENAL FAILURE, STAGE 3 (MODERATE): ICD-10-CM

## 2017-01-09 PROCEDURE — 63600175 PHARM REV CODE 636 W HCPCS: Mod: PO | Performed by: INTERNAL MEDICINE

## 2017-01-09 PROCEDURE — 96372 THER/PROPH/DIAG INJ SC/IM: CPT | Mod: PO

## 2017-01-09 RX ORDER — ALENDRONATE SODIUM 70 MG/1
TABLET ORAL
COMMUNITY
Start: 2016-11-21 | End: 2017-01-01 | Stop reason: ALTCHOICE

## 2017-01-09 RX ADMIN — ERYTHROPOIETIN 20000 UNITS: 20000 INJECTION, SOLUTION INTRAVENOUS; SUBCUTANEOUS at 10:01

## 2017-01-10 ENCOUNTER — PATIENT MESSAGE (OUTPATIENT)
Dept: TRANSPLANT | Facility: CLINIC | Age: 42
End: 2017-01-10

## 2017-01-10 ENCOUNTER — TELEPHONE (OUTPATIENT)
Dept: TRANSPLANT | Facility: CLINIC | Age: 42
End: 2017-01-10

## 2017-01-10 DIAGNOSIS — Z94.1 S/P ORTHOTOPIC HEART TRANSPLANT: ICD-10-CM

## 2017-01-10 DIAGNOSIS — T86.290 VASCULOPATHY OF CARDIAC ALLOGRAFT: ICD-10-CM

## 2017-01-10 DIAGNOSIS — E87.70 HYPERVOLEMIA, UNSPECIFIED HYPERVOLEMIA TYPE: Primary | ICD-10-CM

## 2017-01-10 NOTE — TELEPHONE ENCOUNTER
Spoke with pt about recent labs results. Pt states that he is still tired, getting procrit injections, states that his stomach feels full again and that he feels like he needs   paracentesis again as an outpt, he states that he thinks he can wait until 1/16/17 when offered to move up appointment.

## 2017-01-13 ENCOUNTER — INFUSION (OUTPATIENT)
Dept: INFUSION THERAPY | Facility: HOSPITAL | Age: 42
End: 2017-01-13
Attending: INTERNAL MEDICINE
Payer: MEDICARE

## 2017-01-13 VITALS
TEMPERATURE: 98 F | RESPIRATION RATE: 18 BRPM | HEART RATE: 105 BPM | DIASTOLIC BLOOD PRESSURE: 79 MMHG | SYSTOLIC BLOOD PRESSURE: 114 MMHG

## 2017-01-13 DIAGNOSIS — D63.1 ANEMIA, CHRONIC RENAL FAILURE, STAGE 3 (MODERATE): Primary | ICD-10-CM

## 2017-01-13 DIAGNOSIS — N18.30 ANEMIA OF CHRONIC RENAL FAILURE, STAGE 3 (MODERATE): ICD-10-CM

## 2017-01-13 DIAGNOSIS — N18.9 ANEMIA IN CHRONIC KIDNEY DISEASE: Primary | ICD-10-CM

## 2017-01-13 DIAGNOSIS — D63.1 ANEMIA OF CHRONIC RENAL FAILURE, STAGE 3 (MODERATE): ICD-10-CM

## 2017-01-13 DIAGNOSIS — N18.30 ANEMIA, CHRONIC RENAL FAILURE, STAGE 3 (MODERATE): Primary | ICD-10-CM

## 2017-01-13 DIAGNOSIS — D63.1 ANEMIA IN CHRONIC KIDNEY DISEASE: Primary | ICD-10-CM

## 2017-01-13 PROCEDURE — 63600175 PHARM REV CODE 636 W HCPCS: Mod: PO | Performed by: INTERNAL MEDICINE

## 2017-01-13 PROCEDURE — 96372 THER/PROPH/DIAG INJ SC/IM: CPT | Mod: PO

## 2017-01-13 RX ADMIN — ERYTHROPOIETIN 20000 UNITS: 20000 INJECTION, SOLUTION INTRAVENOUS; SUBCUTANEOUS at 09:01

## 2017-01-16 ENCOUNTER — HOSPITAL ENCOUNTER (OUTPATIENT)
Dept: CARDIOLOGY | Facility: CLINIC | Age: 42
Discharge: HOME | End: 2017-01-16
Payer: MEDICARE

## 2017-01-16 ENCOUNTER — OFFICE VISIT (OUTPATIENT)
Dept: TRANSPLANT | Facility: CLINIC | Age: 42
End: 2017-01-16
Payer: MEDICARE

## 2017-01-16 ENCOUNTER — TELEPHONE (OUTPATIENT)
Dept: TRANSPLANT | Facility: CLINIC | Age: 42
End: 2017-01-16

## 2017-01-16 ENCOUNTER — HOSPITAL ENCOUNTER (OUTPATIENT)
Dept: INTERVENTIONAL RADIOLOGY/VASCULAR | Facility: HOSPITAL | Age: 42
Discharge: HOME | End: 2017-01-16
Attending: INTERNAL MEDICINE
Payer: MEDICARE

## 2017-01-16 VITALS
WEIGHT: 183 LBS | HEIGHT: 67 IN | BODY MASS INDEX: 28.72 KG/M2 | DIASTOLIC BLOOD PRESSURE: 77 MMHG | HEART RATE: 110 BPM | SYSTOLIC BLOOD PRESSURE: 122 MMHG

## 2017-01-16 VITALS
RESPIRATION RATE: 18 BRPM | DIASTOLIC BLOOD PRESSURE: 80 MMHG | OXYGEN SATURATION: 100 % | SYSTOLIC BLOOD PRESSURE: 117 MMHG | HEART RATE: 104 BPM

## 2017-01-16 DIAGNOSIS — T86.290 VASCULOPATHY OF CARDIAC ALLOGRAFT: ICD-10-CM

## 2017-01-16 DIAGNOSIS — I25.10 CORONARY ARTERY DISEASE INVOLVING NATIVE CORONARY ARTERY WITHOUT ANGINA PECTORIS, UNSPECIFIED WHETHER NATIVE OR TRANSPLANTED HEART: ICD-10-CM

## 2017-01-16 DIAGNOSIS — Z79.899 ENCOUNTER FOR LONG-TERM (CURRENT) USE OF MEDICATIONS: Primary | ICD-10-CM

## 2017-01-16 DIAGNOSIS — I73.9 PVD (PERIPHERAL VASCULAR DISEASE): ICD-10-CM

## 2017-01-16 DIAGNOSIS — Z01.818 PRE-OP TESTING: ICD-10-CM

## 2017-01-16 DIAGNOSIS — N28.9 RENAL INSUFFICIENCY: ICD-10-CM

## 2017-01-16 DIAGNOSIS — D84.9 IMMUNOSUPPRESSION: ICD-10-CM

## 2017-01-16 DIAGNOSIS — I10 ESSENTIAL HYPERTENSION: ICD-10-CM

## 2017-01-16 DIAGNOSIS — E08.649 DIABETES MELLITUS DUE TO UNDERLYING CONDITION WITH HYPOGLYCEMIA WITHOUT COMA, WITH LONG-TERM CURRENT USE OF INSULIN: ICD-10-CM

## 2017-01-16 DIAGNOSIS — Z79.60 LONG-TERM USE OF IMMUNOSUPPRESSANT MEDICATION: ICD-10-CM

## 2017-01-16 DIAGNOSIS — E87.70 HYPERVOLEMIA, UNSPECIFIED HYPERVOLEMIA TYPE: ICD-10-CM

## 2017-01-16 DIAGNOSIS — E78.5 HYPERLIPIDEMIA LDL GOAL <70: ICD-10-CM

## 2017-01-16 DIAGNOSIS — Z79.4 DIABETES MELLITUS DUE TO UNDERLYING CONDITION WITH HYPOGLYCEMIA WITHOUT COMA, WITH LONG-TERM CURRENT USE OF INSULIN: ICD-10-CM

## 2017-01-16 DIAGNOSIS — Z94.1 STATUS POST HEART TRANSPLANT: ICD-10-CM

## 2017-01-16 DIAGNOSIS — Z94.1 HEART TRANSPLANTED: Primary | ICD-10-CM

## 2017-01-16 DIAGNOSIS — Z94.1 S/P ORTHOTOPIC HEART TRANSPLANT: ICD-10-CM

## 2017-01-16 DIAGNOSIS — E10.22 TYPE 1 DIABETES MELLITUS WITH STAGE 3 CHRONIC KIDNEY DISEASE: Chronic | ICD-10-CM

## 2017-01-16 DIAGNOSIS — I42.5 RESTRICTIVE CARDIOMYOPATHY: ICD-10-CM

## 2017-01-16 DIAGNOSIS — N18.30 TYPE 1 DIABETES MELLITUS WITH STAGE 3 CHRONIC KIDNEY DISEASE: Chronic | ICD-10-CM

## 2017-01-16 LAB
DIASTOLIC DYSFUNCTION: NO
ESTIMATED PA SYSTOLIC PRESSURE: 37
GLOBAL PERICARDIAL EFFUSION: NORMAL
RETIRED EF AND QEF - SEE NOTES: 58 (ref 55–65)
TRICUSPID VALVE REGURGITATION: NORMAL

## 2017-01-16 PROCEDURE — 49083 ABD PARACENTESIS W/IMAGING: CPT | Mod: ,,, | Performed by: FAMILY MEDICINE

## 2017-01-16 PROCEDURE — 99999 PR PBB SHADOW E&M-EST. PATIENT-LVL III: CPT | Mod: PBBFAC,,, | Performed by: INTERNAL MEDICINE

## 2017-01-16 PROCEDURE — C1729 CATH, DRAINAGE: HCPCS

## 2017-01-16 PROCEDURE — 93306 TTE W/DOPPLER COMPLETE: CPT | Mod: S$GLB,,, | Performed by: INTERNAL MEDICINE

## 2017-01-16 PROCEDURE — 99214 OFFICE O/P EST MOD 30 MIN: CPT | Mod: S$GLB,,, | Performed by: INTERNAL MEDICINE

## 2017-01-16 PROCEDURE — A7048 VACUUM DRAIN BOTTLE/TUBE KIT: HCPCS

## 2017-01-16 NOTE — IP AVS SNAPSHOT
[facility]  [address]  [facility] [address]  Phone: [phone]           Patient Discharge Instructions     Our goal is to set you up for success. This packet includes information on your condition, medications, and your home care. It will help you to care for yourself so you don't get sicker and need to go back to the hospital.     Please ask your nurse if you have any questions.        There are many details to remember when preparing to leave the hospital. Here is what you will need to do:    1. Take your medicine. If you are prescribed medications, review your Medication List in the following pages. You may have new medications to  at the pharmacy and others that you'll need to stop taking. Review the instructions for how and when to take your medications. Talk with your doctor or nurses if you are unsure of what to do.     2. Go to your follow-up appointments. Specific follow-up information is listed in the following pages. Your may be contacted by a transition nurse or clinical provider about future appointments. Be sure we have all of the phone numbers to reach you, if needed. Please contact your provider's office if you are unable to make an appointment.     3. Watch for warning signs. Your doctor or nurse will give you detailed warning signs to watch for and when to call for assistance. These instructions may also include educational information about your condition. If you experience any of warning signs to your health, call your doctor.               [facility]  Unless otherwise directed by your provider, please contact [facility], our nurse care line that is available for 24/7 assistance.     [phone] (toll-free)    Registered nurses in the [facility] provide clinical advisement, health education, appointment booking, and other advisory services.                    ** Verify the list of medication(s) below is accurate and up  to date. Carry this with you in case of emergency. If your medications have changed, please notify your healthcare provider.             Medication List      TAKE these medications        Additional Info                      alendronate 70 MG tablet   Commonly known as:  FOSAMAX   Refills:  0      Begin Date    AM    Noon    PM    Bedtime       aspirin 81 MG EC tablet   Commonly known as:  ECOTRIN   Quantity:  30 tablet   Refills:  11   Dose:  81 mg    Instructions:  Take 1 tablet (81 mg total) by mouth once daily.     Begin Date    AM    Noon    PM    Bedtime       calcium-vitamin D3 500 mg(1,250mg) -200 unit per tablet   Quantity:  60 tablet   Refills:  11   Dose:  1 tablet    Instructions:  Take 1 tablet by mouth once daily.     Begin Date    AM    Noon    PM    Bedtime       escitalopram oxalate 20 MG tablet   Commonly known as:  LEXAPRO   Quantity:  30 tablet   Refills:  11   Dose:  20 mg    Instructions:  Take 1 tablet (20 mg total) by mouth once daily.     Begin Date    AM    Noon    PM    Bedtime       gabapentin 100 MG capsule   Commonly known as:  NEURONTIN   Quantity:  360 capsule   Refills:  11   Dose:  300 mg    Instructions:  Take 3 capsules (300 mg total) by mouth 3 (three) times daily.     Begin Date    AM    Noon    PM    Bedtime       insulin detemir 100 unit/mL (3 mL) Inpn pen   Commonly known as:  LEVEMIR FLEXTOUCH   Quantity:  15 mL   Refills:  12   Dose:  20 Units    Instructions:  Inject 20 Units into the skin 2 (two) times daily.     Begin Date    AM    Noon    PM    Bedtime       insulin lispro 100 unit/mL injection   Commonly known as:  HUMALOG   Quantity:  15 mL   Refills:  12   Dose:  10 Units    Instructions:  Inject 10 Units into the skin 3 (three) times daily before meals.     Begin Date    AM    Noon    PM    Bedtime       lancets Misc   Quantity:  100 each   Refills:  5   Dose:  1 Device   Comments:  ON HOLD, patient will request fill once discharged from SNF    Instructions:  1  "Device by Misc.(Non-Drug; Combo Route) route 4 (four) times daily with meals and nightly.     Begin Date    AM    Noon    PM    Bedtime       levothyroxine 150 MCG tablet   Commonly known as:  SYNTHROID   Quantity:  30 tablet   Refills:  0    Instructions:  TAKE 1 TABLET BY MOUTH EVERY MORNING BEFORE BREAKFAST     Begin Date    AM    Noon    PM    Bedtime       magnesium oxide 400 mg tablet   Commonly known as:  MAG-OX   Quantity:  30 tablet   Refills:  11   Dose:  400 mg    Instructions:  Take 1 tablet (400 mg total) by mouth once daily.     Begin Date    AM    Noon    PM    Bedtime       mycophenolate 180 MG Tbec   Commonly known as:  MYFORTIC   Quantity:  240 tablet   Refills:  11   Dose:  720 mg    Instructions:  Take 4 tablets (720 mg total) by mouth 2 (two) times daily. S/P Heart Transplant 11/18/2014 ICD-10 Z94.1     Begin Date    AM    Noon    PM    Bedtime       nortriptyline 25 MG capsule   Commonly known as:  PAMELOR   Quantity:  30 capsule   Refills:  3   Dose:  25 mg    Instructions:  Take 1 capsule (25 mg total) by mouth every evening.     Begin Date    AM    Noon    PM    Bedtime       pantoprazole 40 MG tablet   Commonly known as:  PROTONIX   Quantity:  30 tablet   Refills:  11    Instructions:  TAKE ONE TABLET BY MOUTH EVERY DAY     Begin Date    AM    Noon    PM    Bedtime       pen needle, diabetic 32 gauge x 5/32" Ndle   Quantity:  200 each   Refills:  12   Dose:  1 Device    Instructions:  1 Device by Misc.(Non-Drug; Combo Route) route 5 (five) times daily.     Begin Date    AM    Noon    PM    Bedtime       pravastatin 40 MG tablet   Commonly known as:  PRAVACHOL   Quantity:  30 tablet   Refills:  11   Dose:  40 mg    Instructions:  Take 1 tablet (40 mg total) by mouth every evening.     Begin Date    AM    Noon    PM    Bedtime       predniSONE 2.5 MG tablet   Commonly known as:  DELTASONE   Quantity:  30 tablet   Refills:  11   Dose:  2.5 mg    Instructions:  Take 1 tablet (2.5 mg total) by " mouth once daily. S/P Heart Transplant 11/18/2014 ICD-10 Z94.1     Begin Date    AM    Noon    PM    Bedtime       tacrolimus 1 MG Cap   Commonly known as:  PROGRAF   Quantity:  150 capsule   Refills:  11    Instructions:  Taked 2mg orally in the morning and 3mg orally in the evening.  S/p heart transplant  11/18/2014  ICD-10 Z94.1     Begin Date    AM    Noon    PM    Bedtime       tamsulosin 0.4 mg Cp24   Commonly known as:  FLOMAX   Quantity:  60 capsule   Refills:  11   Dose:  0.8 mg    Instructions:  Take 2 capsules (0.8 mg total) by mouth every evening.     Begin Date    AM    Noon    PM    Bedtime       torsemide 20 MG Tab   Commonly known as:  DEMADEX   Quantity:  60 tablet   Refills:  11   Dose:  40 mg    Instructions:  Take 2 tablets (40 mg total) by mouth once daily.     Begin Date    AM    Noon    PM    Bedtime                  Please bring to all follow up appointments:    1. A copy of your discharge instructions.  2. All medicines you are currently taking in their original bottles.  3. Identification and insurance card.    Please arrive 15 minutes ahead of scheduled appointment time.    Please call 24 hours in advance if you must reschedule your appointment and/or time.        Your Scheduled Appointments     Jan 20, 2017  9:00 AM CST   Infusion 15 Min with [facility]    [address]  [place] [address]   [phone]            Jan 23, 2017  7:40 AM CST   Fasting Lab with LABORATORY, [facility]    [address]  [place] [address]   [phone]            Jan 23, 2017  8:00 AM CST   Consult with Melani Varela MD   [facility] Dermatology ([facility]    [address]  [place] [address]   [phone]            Jan 27, 2017  8:40 AM CST   Non-Fasting Lab with LABORATORY, [facility]    [address] [place] [address]   [phone]            Jan 27, 2017  9:00  AM CST   Established Patient Visit with Adolfo Robles MD   [facility] - Hemotology Oncology ([facility])    [address] [facility] [place] [id number] [phone]                  Discharge Instructions         ROCU [phone] M-F 8:00 - 5:00  After hours [phone] (ask for radiology intern on call)        Discharge References/Attachments     PARACENTESIS, DISCHARGE INSTRUCTIONS FOR (ENGLISH)        Admission Information     Date & Time Provider Department CSN    1/16/2017  1:30 PM Chandni Fernandez MD [facility] [id number]      Care Providers     Provider Role Specialty Primary office phone    Chandni Fernandez MD Attending Provider Cardiology [phone]      Your Vitals Were     BP Pulse Resp SpO2          120/84 103 18 99%        Recent Lab Values        9/23/2015 12/3/2015 2/26/2016 3/16/2016 7/6/2016 9/18/2016 10/4/2016 12/15/2016      8:24 AM  9:40 AM 10:52 AM  8:55 AM  9:15 AM  4:30 PM  8:48 PM  4:30 AM    A1C 8.7 (H) 7.9 (H) 8.2 (H) 8.2 (H) 7.4 (H) 7.0 (H) 7.0 (H) 7.4 (H)    Comment for A1C at  9:15 AM on 7/6/2016:  According to ADA guidelines, hemoglobin A1C <7.0% represents  optimal control in non-pregnant diabetic patients.  Different  metrics may apply to specific populations.   Standards of Medical Care in Diabetes - 2016.  For the purpose of screening for the presence of diabetes:  <5.7%     Consistent with the absence of diabetes  5.7-6.4%  Consistent with increasing risk for diabetes   (prediabetes)  >or=6.5%  Consistent with diabetes  Currently no consensus exists for use of hemoglobin A1C  for diagnosis of diabetes for children.      Comment for A1C at  4:30 PM on 9/18/2016:  According to ADA guidelines, hemoglobin A1C <7.0% represents  optimal control in non-pregnant diabetic patients.  Different  metrics may apply to specific populations.   Standards of Medical Care in Diabetes - 2016.  For the purpose of screening for the presence of diabetes:  <5.7%     Consistent with the  absence of diabetes  5.7-6.4%  Consistent with increasing risk for diabetes   (prediabetes)  >or=6.5%  Consistent with diabetes  Currently no consensus exists for use of hemoglobin A1C  for diagnosis of diabetes for children.      Comment for A1C at  8:48 PM on 10/4/2016:  According to ADA guidelines, hemoglobin A1C <7.0% represents  optimal control in non-pregnant diabetic patients.  Different  metrics may apply to specific populations.   Standards of Medical Care in Diabetes - 2016.  For the purpose of screening for the presence of diabetes:  <5.7%     Consistent with the absence of diabetes  5.7-6.4%  Consistent with increasing risk for diabetes   (prediabetes)  >or=6.5%  Consistent with diabetes  Currently no consensus exists for use of hemoglobin A1C  for diagnosis of diabetes for children.      Comment for A1C at  4:30 AM on 12/15/2016:  According to ADA guidelines, hemoglobin A1C <7.0% represents  optimal control in non-pregnant diabetic patients.  Different  metrics may apply to specific populations.   Standards of Medical Care in Diabetes - 2016.  For the purpose of screening for the presence of diabetes:  <5.7%     Consistent with the absence of diabetes  5.7-6.4%  Consistent with increasing risk for diabetes   (prediabetes)  >or=6.5%  Consistent with diabetes  Currently no consensus exists for use of hemoglobin A1C  for diagnosis of diabetes for children.        Allergies as of 1/16/2017        Reactions    No Known Drug Allergies       Advance Directives     An advance directive is a document which, in the event you are no longer able to make decisions for yourself, tells your healthcare team what kind of treatment you do or do not want to receive, or who you would like to make those decisions for you.  If you do not currently have an advance directive, [facility] encourages you to create one.  For more information call:  [phone]), [phone] ([phone]),  or log on to  [email or web address].        Language Assistance Services     ATTENTION: Language assistance services are available, free of charge. Please call [phone].      ATENCIÓN: Si habla trino, tiene a hernandez disposición servicios gratuitos de asistencia lingüística. Llame al [phone].     CHÚ Ý: N?u b?n nói Ti?ng Vi?t, có các d?ch v? h? tr? ngôn ng? mi?n phí dành cho b?n. G?i s? [phone].        Chronic Kindey Disease Education             Diabetes Discharge Instructions                                    [facility] complies with applicable Federal civil rights laws and does not discriminate on the basis of race, color, national origin, age, disability, or sex.

## 2017-01-16 NOTE — H&P
Radiology History & Physical      SUBJECTIVE:     Chief Complaint: ascites    History of Present Illness:  Lv Crocker is a 41 y.o. male who presents for ultrasound guided paracentesis  Past Medical History   Diagnosis Date    Arthritis     CAD (coronary artery disease)      Coronary artery disease    Cardiomyopathy     CHF (congestive heart failure)     Depression     Encounter for blood transfusion     GERD (gastroesophageal reflux disease)     Hashimoto's disease     HLD (hyperlipidemia) 6/11/2015    Hypoglycemia unawareness in type 1 diabetes mellitus     Hypothyroid     Myocardial infarction     Syncope 6/30/2015    Type I (juvenile type) diabetes mellitus with unspecified complication, uncontrolled      Diagnosis at 6 years old    Unspecified essential hypertension 5/29/2014     Past Surgical History   Procedure Laterality Date    Foot split transfer of the posterior tibialis tendon procedure      Knee surgery      Cardiac surgery      Cardiac catheterization      Coronary angioplasty      Stent placemnet      Cholecystectomy      S/p oht  November 2014    Heart transplant  2014       Home Meds:   Prior to Admission medications    Medication Sig Start Date End Date Taking? Authorizing Provider   alendronate (FOSAMAX) 70 MG tablet  11/21/16   Historical Provider, MD   aspirin (ECOTRIN) 81 MG EC tablet Take 1 tablet (81 mg total) by mouth once daily. 12/11/14   Chandni Fernandez MD   calcium-vitamin D3 500 mg(1,250mg) -200 unit per tablet Take 1 tablet by mouth once daily. 12/19/16   Chandni Fernandez MD   escitalopram oxalate (LEXAPRO) 20 MG tablet Take 1 tablet (20 mg total) by mouth once daily. 12/23/16   Chandni Fernandez MD   gabapentin (NEURONTIN) 100 MG capsule Take 3 capsules (300 mg total) by mouth 3 (three) times daily. 9/19/16   Reba Zamarripa PA-C   insulin detemir (LEVEMIR FLEXTOUCH) 100 unit/mL (3 mL) SubQ InPn pen Inject 20 Units into the skin 2 (two) times  "daily. 12/20/16 12/20/17  Chandni Fernandez MD   insulin lispro (HUMALOG) 100 unit/mL injection Inject 10 Units into the skin 3 (three) times daily before meals. 12/20/16 12/20/17  Chandni Fernandez MD   insulin needles, disposable, 32 x 5/32 " Ndle 1 Device by Misc.(Non-Drug; Combo Route) route 5 (five) times daily. 6/11/15   Monica Fernandez DNP, NP   lancets Misc 1 Device by Misc.(Non-Drug; Combo Route) route 4 (four) times daily with meals and nightly. 12/24/14   JOSE DANIEL Haskins   levothyroxine (SYNTHROID) 150 MCG tablet TAKE 1 TABLET BY MOUTH EVERY MORNING BEFORE BREAKFAST 12/30/16   JOSE DANIEL Whitten   magnesium oxide (MAG-OX) 400 mg tablet Take 1 tablet (400 mg total) by mouth once daily. 12/20/16   Chandni Fernandez MD   mycophenolate (MYFORTIC) 180 MG TbEC Take 4 tablets (720 mg total) by mouth 2 (two) times daily. S/P Heart Transplant 11/18/2014 ICD-10 Z94.1 10/20/16   Jose A Lloyd MD   nortriptyline (PAMELOR) 25 MG capsule Take 1 capsule (25 mg total) by mouth every evening. 9/19/16   Reba Zamarripa PA-C   pantoprazole (PROTONIX) 40 MG tablet TAKE ONE TABLET BY MOUTH EVERY DAY 8/15/16   Tim Mao NP   pravastatin (PRAVACHOL) 40 MG tablet Take 1 tablet (40 mg total) by mouth every evening. 3/24/16   Sharron Chung MD   predniSONE (DELTASONE) 2.5 MG tablet Take 1 tablet (2.5 mg total) by mouth once daily. S/P Heart Transplant 11/18/2014 ICD-10 Z94.1 10/20/16   Jose A Lloyd MD   tacrolimus (PROGRAF) 1 MG Cap Taked 2mg orally in the morning and 3mg orally in the evening.   S/p heart transplant  11/18/2014  ICD-10 Z94.1 12/19/16   Chandni Fernandez MD   tamsulosin (FLOMAX) 0.4 mg Cp24 Take 2 capsules (0.8 mg total) by mouth every evening. 5/12/16   Chandni Fernandez MD   torsemide (DEMADEX) 20 MG Tab Take 2 tablets (40 mg total) by mouth once daily. 12/20/16 12/20/17  Chandni Fernandez MD     Anticoagulants/Antiplatelets: aspirin; last dose today    Allergies:   Review of patient's " allergies indicates:   Allergen Reactions    No known drug allergies      Sedation History:  no adverse reactions    Review of Systems:   Hematological: no known coagulopathies  Respiratory: no shortness of breath  Cardiovascular: no chest pain  Gastrointestinal: no abdominal pain  Genito-Urinary: no dysuria  Musculoskeletal: negative  Neurological: no TIA or stroke symptoms         OBJECTIVE:     Vital Signs (Most Recent)       Physical Exam:  ASA: 3  Mallampati: n/a    General: no acute distress  Mental Status: alert and oriented to person, place and time  HEENT: normocephalic, atraumatic  Chest: unlabored breathing  Heart: regular heart rate  Abdomen: distended  Extremity: moves all extremities    Laboratory  Lab Results   Component Value Date    INR 1.0 01/16/2017       Lab Results   Component Value Date    WBC 3.71 (L) 01/16/2017    HGB 9.7 (L) 01/16/2017    HCT 30.0 (L) 01/16/2017    MCV 92 01/16/2017     01/16/2017      Lab Results   Component Value Date    GLU 85 01/09/2017     01/09/2017    K 4.6 01/09/2017     01/09/2017    CO2 30 (H) 01/09/2017    BUN 35 (H) 01/09/2017    CREATININE 2.6 (H) 01/09/2017    CALCIUM 9.3 01/09/2017    MG 1.8 12/20/2016    ALT 14 01/09/2017    AST 20 01/09/2017    ALBUMIN 3.5 01/09/2017    BILITOT 0.7 01/09/2017    BILIDIR 0.5 (H) 09/19/2016       ASSESSMENT/PLAN:     Sedation Plan: local  Patient will undergo ultrasound guided paracentesis.    VAISHALI Colby, FNP  Interventional Radiology  [phone] spectralink

## 2017-01-16 NOTE — IP AVS SNAPSHOT
[address]  [place] [address]  Phone: [phone]           I have received a copy of my After Visit Summary and discharge instructions from [facility].    INSTRUCTIONS RECEIVED AND UNDERSTOOD BY:                     Patient/Patient Representative: ________________________________________________________________     Date/Time: ________________________________________________________________                     Instructions Given By: ________________________________________________________________     Date/Time: ________________________________________________________________

## 2017-01-16 NOTE — TELEPHONE ENCOUNTER
Pt seen in clinic this am, reviewed his labs and missing cmp, will get drawn today with PT (INR), Pt will get a paracentisi done today in interventional radiology.  Met with pt with Dr. Fernandez in the clinic room.  Last annual was completed in Feb 2016 and since he was Hospitalized in DEcember 2016 will procede to f/u in 1-2 months for annual testing, Pt's wife will be undergoing back surgery.

## 2017-01-16 NOTE — PROCEDURES
Radiology Post-Procedure Note    Pre Op Diagnosis: Ascites  Post Op Diagnosis: Same    Procedure: Ultrasound Guided Paracentesis    Procedure performed by: Paresh SHEFFIELD, Maribell     Written Informed Consent Obtained: Yes  Specimen Removed: YES cloudy yellow fluid  Estimated Blood Loss: Minimal    Findings:   Successful paracentesis.  Albumin administered PRN per protocol.    Patient tolerated procedure well.    Maribell Yoder, APRN, FNP  Interventional Radiology  [phone] [employer]

## 2017-01-16 NOTE — MR AVS SNAPSHOT
[facility]  [address]  [facility] [address]  Phone: [phone]                  Lv Crocker   2017 10:30 AM   Appointment    Description:  Male : 1975   Provider:  Chandni Fernandez MD   Department:  [facility]                To Do List           Future Appointments        Provider Department Dept Phone    2017 10:30 AM Chandni Fernandez MD [facility] [phone]    2017 9:00 AM CHAIR 03 [facility] [phone]    2017 8:00 AM Melani Varela MD [facility] Dermatology [phone]    2017 8:40 AM LABORATORY, [facility] [phone]    2017 9:00 AM Adolfo Robles MD [facility] Hemotology Oncology [phone]      Goals (5 Years of Data)     None      [facility]     [facility] Care [facility] -  Assistance  Registered nurses in the [facility] provide clinical advisement, health education, appointment booking, and other advisory services.  Call for this free service at [phone].             Medications           Message regarding Medications     Verify the changes and/or additions to your medication regime listed below are the same as discussed with your clinician today.  If any of these changes or additions are incorrect, please notify your healthcare provider.             Verify that the below list of medications is an accurate representation of the medications you are currently taking.  If none reported, the list may be blank. If incorrect, please contact your healthcare provider. Carry this list with you in case of emergency.           Current Medications     alendronate (FOSAMAX) 70 MG tablet     aspirin (ECOTRIN) 81 MG EC tablet Take 1 tablet (81 mg total) by mouth once daily.    calcium-vitamin D3 500 mg(1,250mg) -200 unit per tablet Take 1 tablet by mouth once daily.    escitalopram oxalate (LEXAPRO) 20 MG tablet Take 1  "tablet (20 mg total) by mouth once daily.    gabapentin (NEURONTIN) 100 MG capsule Take 3 capsules (300 mg total) by mouth 3 (three) times daily.    insulin detemir (LEVEMIR FLEXTOUCH) 100 unit/mL (3 mL) SubQ InPn pen Inject 20 Units into the skin 2 (two) times daily.    insulin lispro (HUMALOG) 100 unit/mL injection Inject 10 Units into the skin 3 (three) times daily before meals.    insulin needles, disposable, 32 x 5/32 " Ndle 1 Device by Misc.(Non-Drug; Combo Route) route 5 (five) times daily.    lancets Misc 1 Device by Misc.(Non-Drug; Combo Route) route 4 (four) times daily with meals and nightly.    levothyroxine (SYNTHROID) 150 MCG tablet TAKE 1 TABLET BY MOUTH EVERY MORNING BEFORE BREAKFAST    magnesium oxide (MAG-OX) 400 mg tablet Take 1 tablet (400 mg total) by mouth once daily.    mycophenolate (MYFORTIC) 180 MG TbEC Take 4 tablets (720 mg total) by mouth 2 (two) times daily. S/P Heart Transplant 11/18/2014 ICD-10 Z94.1    nortriptyline (PAMELOR) 25 MG capsule Take 1 capsule (25 mg total) by mouth every evening.    pantoprazole (PROTONIX) 40 MG tablet TAKE ONE TABLET BY MOUTH EVERY DAY    pravastatin (PRAVACHOL) 40 MG tablet Take 1 tablet (40 mg total) by mouth every evening.    predniSONE (DELTASONE) 2.5 MG tablet Take 1 tablet (2.5 mg total) by mouth once daily. S/P Heart Transplant 11/18/2014 ICD-10 Z94.1    tacrolimus (PROGRAF) 1 MG Cap Taked 2mg orally in the morning and 3mg orally in the evening.   S/p heart transplant  11/18/2014  ICD-10 Z94.1    tamsulosin (FLOMAX) 0.4 mg Cp24 Take 2 capsules (0.8 mg total) by mouth every evening.    torsemide (DEMADEX) 20 MG Tab Take 2 tablets (40 mg total) by mouth once daily.           Clinical Reference Information           Allergies as of 1/16/2017     No Known Drug Allergies      Immunizations Administered on Date of Encounter - 1/16/2017     None      Maintenance Dialysis History     Patient has no recorded history of maintenance dialysis.      Transplant " Information        Txp Date Organ Coordinator Care Team    11/18/2014 Heart Lisa Wilkinson Referring Physician:  Placido Tobias MD   Corresponding Physician:  Placido Tobias MD   Surgeon:  Prem Nath MD

## 2017-01-16 NOTE — LETTER
January 16, 2017        Placido Tobias  [address] [address]  Phone: [phone]  Fax: [phone]             [facility] [address] [place] [address]  Phone: [phone]   Patient: Lv Crocker   MR Number: [id number]   YOB: 1975   Date of Visit: 1/16/2017       Dear Dr. Placido Tobias    Thank you for referring Lv Crocker to me for evaluation. Attached you will find relevant portions of my assessment and plan of care.    If you have questions, please do not hesitate to call me. I look forward to following Lv Crocker along with you.    Sincerely,    Chandni Fernandez MD    Enclosure    If you would like to receive this communication electronically, please contact [email or web address] or [phone] to request [facility] access.    [facility] is a tool which provides read-only access to select patient information with whom you have a relationship. Its easy to use and provides real time access to review your patients record including encounter summaries, notes, results, and demographic information.    If you feel you have received this communication in error or would no longer like to receive these types of communications, please e-mail [email or web address]

## 2017-01-16 NOTE — PROGRESS NOTES
Patient stable, tolerated paracentesis well,3 L removed,no albumin given per protocol.  Discharge instruction, and follow up care reviewed.  Patient verbalized understanding, and denies further questions.  Provided with ROCU and after hours number.

## 2017-01-16 NOTE — PROGRESS NOTES
Subjective:   Mr. Crocker is a 41 y.o. year old White male who received a  - brain death heart transplant on 14.      CMV status:   Donor: -  Recipient: +    HPI  Lv Crocker is a very pleasant  40 yo s/p OHTX , complicated by AMR, treated 04/15 with 5 days PP, IVIG x 2 doses, was scheduled for Rituximab on 5/1/15 but developed pancytopenia in 2015 who comes in for a clinic visit.     Recent angiogram with IVUS showed no evidence of CAV. Comes today for a follow-up visit- was admitted - for volume overload- He did not diurese well initially and his renal function worsened. IR was consulted and paracentesis was performed in which >4L was removed. His renal function improved slightly after paracentesis and he felt much better. Endo was also following secondary to hyperglycemia. He was on an insulin gtt and eventually transitioned to Novolog/Levemir. His renal function plateaued(Cr 2.8) and he was discharged home on Torsemide 40mg daily.      Since dc home pt has been doing ok- wt cont to fluctuate up and down 5#- good UOP with torsemide 40mg once a day- has a good bit of ascites that has re accumulated, but little swelling in his feet. Says his breathing is ok. Sugars well controlled at home    Current immunosuppression: pred 2.5 mg daily, myfortic 720mg bid, prograf 2mg/3mg    TTE today   1 - Post-cardiac transplantation study.     2 - Normal left ventricular systolic function (EF 55-60%).     3 - Low normal right ventricular systolic function .     4 - Trivial to mild tricuspid regurgitation.     5 - Trivial to mild pulmonic regurgitation.     6 - Trivial pericardial effusion.     7 - The estimated PA systolic pressure is 37 mmHg.     RHC/Bx 16  Grade 0 cellular and pAMR  RHC:  AOSAT: 97  FICKCI: 2.92  FICKCO: 5.75  PAPRES: 46/23 (30)  PASAT: 61  PVR: 0.7  PWPRES:  (26)  RAPRES:  (18)  RVPRES: 39/18, 18:  PWSAT: 98    Immunosupression: tacro 4mg bid  Myfortic  "360mg bid, pred 2.5     Review of Systems   Constitution: Negative. Negative for chills, decreased appetite, diaphoresis, fever, weakness, malaise/fatigue, night sweats, weight gain and weight loss.   HENT: Negative.    Eyes: Negative.    Cardiovascular: Negative for chest pain, claudication, cyanosis, dyspnea on exertion, irregular heartbeat, leg swelling, near-syncope, orthopnea, palpitations, paroxysmal nocturnal dyspnea and syncope.   Respiratory: Negative for cough, hemoptysis and shortness of breath.    Endocrine: Negative.    Hematologic/Lymphatic: Negative.    Skin: Negative.  Negative for color change, dry skin and nail changes.   Musculoskeletal: Negative.    Gastrointestinal: Positive for bloating. Negative for abdominal pain and change in bowel habit.   Genitourinary: Negative.    Neurological: Negative for dizziness and light-headedness.   Psychiatric/Behavioral: Negative for depression.       Objective:   Blood pressure 122/77, pulse 110, height 5' 7" (1.702 m), weight 83 kg (182 lb 15.7 oz).body mass index is 28.66 kg/(m^2).    Physical Exam   Constitutional: He appears well-developed.        HENT:   Head: Normocephalic.   Neck: No JVD present. Carotid bruit is not present.   Cardiovascular: Regular rhythm, normal heart sounds and normal pulses.  Tachycardia present.  PMI is not displaced.  Exam reveals no gallop and no S3.    No murmur heard.  tachy   Pulmonary/Chest: Effort normal and breath sounds normal. No respiratory distress. He has no wheezes.   Abdominal: Soft. Bowel sounds are normal. He exhibits distension. There is no tenderness. There is no rebound.   Musculoskeletal: He exhibits no edema.   Neurological: He is alert.   Skin: Skin is warm.   Vitals reviewed.      Chemistry        Component Value Date/Time     01/09/2017 0914    K 4.6 01/09/2017 0914     01/09/2017 0914    CO2 30 (H) 01/09/2017 0914    BUN 35 (H) 01/09/2017 0914    CREATININE 2.6 (H) 01/09/2017 0914    GLU 85 " 01/09/2017 0914        Component Value Date/Time    CALCIUM 9.3 01/09/2017 0914    ALKPHOS 164 (H) 01/09/2017 0914    AST 20 01/09/2017 0914    ALT 14 01/09/2017 0914    BILITOT 0.7 01/09/2017 0914            Magnesium   Date Value Ref Range Status   12/20/2016 1.8 1.6 - 2.6 mg/dL Final       Lab Results   Component Value Date    WBC 3.71 (L) 01/16/2017    HGB 9.7 (L) 01/16/2017    HCT 30.0 (L) 01/16/2017    MCV 92 01/16/2017     01/16/2017       Lab Results   Component Value Date    INR 1.0 12/18/2016    INR 1.1 12/17/2016    INR 1.1 12/16/2016       BNP   Date Value Ref Range Status   01/09/2017 243 (H) 0 - 99 pg/mL Final     Comment:     Values of less than 100 pg/ml are consistent with non-CHF populations.   01/03/2017 178 (H) 0 - 99 pg/mL Final     Comment:     Values of less than 100 pg/ml are consistent with non-CHF populations.   12/29/2016 151 (H) 0 - 99 pg/mL Final     Comment:     Values of less than 100 pg/ml are consistent with non-CHF populations.       LD   Date Value Ref Range Status   04/22/2015 397 (H) 110 - 260 U/L Final     Comment:     Results are increased in hemolyzed samples.   12/06/2012 211 110 - 260 U/L Final     Comment:     Results are increased in hemolyzed samples.             Tacrolimus Lvl   Date Value Ref Range Status   01/09/2017 8.7 5.0 - 15.0 ng/mL Final     Comment:     Testing performed by Liquid Chromatography-Tandem  Mass Spectrometry (LC-MS/MS).  This test was developed and its performance characteristics  determined by [facility], Department of Pathology  and Laboratory Medicine in a manner consistent with CLIA  requirements. It has not been cleared or approved by the [place]  Food and Drug Administration.  This test is used for clinical   purposes.  It should not be regarded as investigational or for  research.       No results found for: SIROLIMUS  No results found for: CYCLOSPORINE    No results found for this or any previous visit.  No results found for  this or any previous visit.    Labs were reviewed with the patient.    Assessment:     1. Heart transplanted- echo today actually looks pretty good- EF 50-55%, with normal diastolic fxn- has evidence of pleural and pericardial effusions which are stable in size   2. Heart transplant rejection    3. Essential hypertension    4. 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 Ischemic cardiomyopathy    6. Hyperlipidemia    7. Coronary artery disease involving native coronary artery without angina pectoris, unspecified whether native or transplanted heart    8. Chronic kidney disease (CKD), stage III (moderate)    9. 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 Immunosuppression        Plan:   Continue current regimen- will set up outpt paracentesis and reorder labs as there was a scheduling mixup    Due for annual ischemic eval- will get stress test in Feb    Have been discussing referral to kidney tx medicine- sees his npehrologist later this month, asked him to discuss with him as well    Return instructions as set forth by post transplant schedule or as needed:    Clinic: Return for labs and/or biopsy weekly the first month, every two weeks during month 2 and then monthly for the first year at the provider or coordinator's discretion. During the second year, return to clinic every 3 months. Post transplant year 3-5 return every 6 months. There will be a comprehensive post transplant evaluation every year that may include LHC/RHC/biopsy, stress test, echo, CXR, and other health screening exams.    In addition to the clinical assessment, I have ordered Allomap testing for this patient to assist in identification of moderate/severe acute cellular rejection (ACR) in a pt with stable Allograft function instead of endomyocardial biopsy.     Patient is reminded to call with any health changes as these can be early signs of transplant complications. Patient is advised to  make sure any new medications or changes of old medications are discussed with a pharmacist or physician knowledgeable with transplant to avoid rejection/drug toxicity related to significant drug interactions.    UNOS Patient Status  Functional Status: 100% - Normal, no complaints, no evidence of disease  Physical Capacity: No Limitations  Working for Income: Unknown    Chandni Fernandez MD

## 2017-01-19 ENCOUNTER — TELEPHONE (OUTPATIENT)
Dept: TRANSPLANT | Facility: CLINIC | Age: 42
End: 2017-01-19

## 2017-01-19 NOTE — TELEPHONE ENCOUNTER
Spoke with pt this morning, his wife is doing well, she is suppose to be released from the Hospital today after back surgery.  He states he is doing well, he is scheduled for repeat labs on Monday 1.23.17 at [facility].  Repeating after having paracentesis on Monday 1/16/17 and f/u on Creatinine.

## 2017-01-20 ENCOUNTER — INFUSION (OUTPATIENT)
Dept: INFUSION THERAPY | Facility: HOSPITAL | Age: 42
End: 2017-01-20
Attending: INTERNAL MEDICINE
Payer: MEDICARE

## 2017-01-20 VITALS
DIASTOLIC BLOOD PRESSURE: 72 MMHG | RESPIRATION RATE: 18 BRPM | HEART RATE: 113 BPM | TEMPERATURE: 98 F | SYSTOLIC BLOOD PRESSURE: 108 MMHG

## 2017-01-20 DIAGNOSIS — N18.30 TYPE 1 DIABETES MELLITUS WITH STAGE 3 CHRONIC KIDNEY DISEASE: Primary | ICD-10-CM

## 2017-01-20 DIAGNOSIS — N18.30 ANEMIA OF CHRONIC RENAL FAILURE, STAGE 3 (MODERATE): ICD-10-CM

## 2017-01-20 DIAGNOSIS — D63.1 ANEMIA OF CHRONIC RENAL FAILURE, STAGE 3 (MODERATE): ICD-10-CM

## 2017-01-20 DIAGNOSIS — E10.22 TYPE 1 DIABETES MELLITUS WITH STAGE 3 CHRONIC KIDNEY DISEASE: Primary | ICD-10-CM

## 2017-01-20 PROCEDURE — 96372 THER/PROPH/DIAG INJ SC/IM: CPT | Mod: PO

## 2017-01-20 PROCEDURE — 63600175 PHARM REV CODE 636 W HCPCS: Mod: PO | Performed by: INTERNAL MEDICINE

## 2017-01-20 RX ADMIN — ERYTHROPOIETIN 20000 UNITS: 20000 INJECTION, SOLUTION INTRAVENOUS; SUBCUTANEOUS at 09:01

## 2017-01-20 NOTE — MR AVS SNAPSHOT
"Patient Information     Patient Name Sex Lv Moura Male 1975      Visit Information        Provider Department Dept Phone Center    2017 9:00 AM [facility] Chemo Infusion [facility] Chemotherapy Infusion [phone] [facility]      Patient Instructions    .  [facility]  [address] [facility] [address]  [address] [address]  [phone] phone     [phone] fax  Hours of Operation: Monday- Friday 8:00am- 5:00pm  After hours phone  [phone]  Hematology / Oncology Physicians on call      Dr. Zane Thomson    Please call with any concerns regarding your appointment today.         Your Current Medications Are     alendronate (FOSAMAX) 70 MG tablet    aspirin (ECOTRIN) 81 MG EC tablet    calcium-vitamin D3 500 mg(1,250mg) -200 unit per tablet    escitalopram oxalate (LEXAPRO) 20 MG tablet    gabapentin (NEURONTIN) 100 MG capsule    insulin detemir (LEVEMIR FLEXTOUCH) 100 unit/mL (3 mL) SubQ InPn pen    insulin lispro (HUMALOG) 100 unit/mL injection    insulin needles, disposable, 32 x 5/32 " Ndle    lancets Misc    levothyroxine (SYNTHROID) 150 MCG tablet    magnesium oxide (MAG-OX) 400 mg tablet    mycophenolate (MYFORTIC) 180 MG TbEC    nortriptyline (PAMELOR) 25 MG capsule    pantoprazole (PROTONIX) 40 MG tablet    pravastatin (PRAVACHOL) 40 MG tablet    predniSONE (DELTASONE) 2.5 MG tablet    tacrolimus (PROGRAF) 1 MG Cap    tamsulosin (FLOMAX) 0.4 mg Cp24    torsemide (DEMADEX) 20 MG Tab      Facility-Administered Medications     epoetin jose miguel injection 20,000 Units      Appointments for Next Year     2017  7:40 AM FASTING LAB (5 min.) [facility] LABORATORY, [facility]    1. Do not eat or drink anything for TEN HOURS (10) PRIOR TO TEST. Do not chew gum or eat candy mints, even those claiming to be sugar free. Water is allowed but do not drink any other fluids 2. Take your regular daily medicines as your doctor has " ordered. If you are diabetic, do not take your insulin or other diabetic medication until your blood is drawn and you are ready to eat. Your physician may have special instructions for diabetics. Check with your doctor if you have any questions.3. Alcoholic beverages are not allowed starting at 6:00pm the evening before your appointment.    ([address]) 2nd floor    1/23/2017  8:00 AM CONSULT - DERM ([facility]) (15 min.) [facility] - Dermatology Melani Varela MD    Arrive at check-in approximately 15 minutes before your scheduled appointment time. Bring all outside medical records and imaging, along with a list of your current medications and insurance card.    (off [address]) 3rd floor    1/27/2017  8:40 AM NON FASTING LAB (5 min.) [facility]    Arrive at check-in approximately 15 minutes before your scheduled appointment time. Bring all outside medical records and imaging, along with a list of your current medications and insurance card.    (off [address]) 2nd floor    1/27/2017  9:00 AM ESTABLISHED PATIENT (20 min.) [facility] Hemotology Oncology Adolfo Robles MD    Arrive at check-in approximately 15 minutes before your scheduled appointment time. Bring all outside medical records and imaging, along with a list of your current medications and insurance card.    (off [address]) 3rd Floor    1/27/2017  9:30 AM INFUSION 015 MIN (15 min.) [facility]    Arrive at check-in approximately 15 minutes before your scheduled appointment time. Bring all outside medical records and imaging, along with a list of your current medications and insurance card.    1/31/2017  3:00 PM ESTABLISHED PATIENT (30 min.) [facility] Nephrology Bonilla Zambrano MD    Arrive at check-in approximately 15 minutes before your scheduled appointment time. Bring all outside medical records and imaging, along with a list of your current medications and  insurance card.    (off [facility]) 2nd floor    3/13/2017  9:00 AM EST PATIENT - ENDOCRINE (30 min.) [facility] Endocrinology Monica Fernandez, RA, NP    Arrive at check-in approximately 15 minutes before your scheduled appointment time. Bring all outside medical records and imaging, along with a list of your current medications and insurance card.    6th Floor         Default Flowsheet Data (last 24 hours)      Amb Complex Vitals Rene        01/20/17 0951                Measurements    /72        Temp 98.1 °F (36.7 °C)        Pulse (!)  113        Resp 18        Pain Assessment    Pain Score Zero                Allergies     No Known Drug Allergies       Medications You Received from 01/19/2017 0959 to 01/20/2017 0959        Date/Time Order Dose Route Action     01/20/2017 0956 epoetin jose miguel injection 20,000 Units 20,000 Units Subcutaneous Given      Current Discharge Medication List     Cannot display discharge medications since this is not an admission.

## 2017-01-20 NOTE — PATIENT INSTRUCTIONS
.  [facility]  [address] [facility] [address]  [address] [address]  [phone] phone     [phone] fax  Hours of Operation: Monday- Friday 8:00am- 5:00pm  After hours phone  [phone]  Hematology / Oncology Physicians on call      Dr. Zane Thomson    Please call with any concerns regarding your appointment today.

## 2017-01-23 ENCOUNTER — TELEPHONE (OUTPATIENT)
Dept: HEMATOLOGY/ONCOLOGY | Facility: CLINIC | Age: 42
End: 2017-01-23

## 2017-01-23 ENCOUNTER — LAB VISIT (OUTPATIENT)
Dept: LAB | Facility: HOSPITAL | Age: 42
End: 2017-01-23
Attending: INTERNAL MEDICINE
Payer: MEDICARE

## 2017-01-23 ENCOUNTER — INITIAL CONSULT (OUTPATIENT)
Dept: DERMATOLOGY | Facility: CLINIC | Age: 42
End: 2017-01-23
Payer: MEDICARE

## 2017-01-23 DIAGNOSIS — T86.20 COMPLICATION OF HEART TRANSPLANT, UNSPECIFIED COMPLICATION: ICD-10-CM

## 2017-01-23 DIAGNOSIS — L29.9 PRURITUS: ICD-10-CM

## 2017-01-23 DIAGNOSIS — Z94.1 HISTORY OF HEART TRANSPLANT: ICD-10-CM

## 2017-01-23 DIAGNOSIS — Z79.899 ENCOUNTER FOR LONG-TERM (CURRENT) USE OF MEDICATIONS: ICD-10-CM

## 2017-01-23 DIAGNOSIS — D50.9 IRON DEFICIENCY ANEMIA, UNSPECIFIED IRON DEFICIENCY ANEMIA TYPE: ICD-10-CM

## 2017-01-23 DIAGNOSIS — R00.0 TACHYCARDIA: ICD-10-CM

## 2017-01-23 DIAGNOSIS — I10 ESSENTIAL HYPERTENSION: ICD-10-CM

## 2017-01-23 DIAGNOSIS — N28.9 RENAL INSUFFICIENCY: ICD-10-CM

## 2017-01-23 DIAGNOSIS — Z79.52 LONG TERM CURRENT USE OF SYSTEMIC STEROIDS: ICD-10-CM

## 2017-01-23 DIAGNOSIS — Z94.1 STATUS POST HEART TRANSPLANT: ICD-10-CM

## 2017-01-23 DIAGNOSIS — Z12.83 SCREENING, MALIGNANT NEOPLASM, SKIN: ICD-10-CM

## 2017-01-23 DIAGNOSIS — T07.XXXA MULTIPLE EXCORIATIONS: Primary | ICD-10-CM

## 2017-01-23 DIAGNOSIS — E10.42 TYPE 1 DIABETES MELLITUS WITH DIABETIC POLYNEUROPATHY: Chronic | ICD-10-CM

## 2017-01-23 LAB
ALBUMIN SERPL BCP-MCNC: 3.5 G/DL
ALP SERPL-CCNC: 158 U/L
ALT SERPL W/O P-5'-P-CCNC: 16 U/L
ANION GAP SERPL CALC-SCNC: 10 MMOL/L
AST SERPL-CCNC: 18 U/L
BASOPHILS # BLD AUTO: 0.02 K/UL
BASOPHILS NFR BLD: 0.5 %
BILIRUB SERPL-MCNC: 0.7 MG/DL
BNP SERPL-MCNC: 151 PG/ML
BUN SERPL-MCNC: 31 MG/DL
CALCIUM SERPL-MCNC: 9.3 MG/DL
CHLORIDE SERPL-SCNC: 100 MMOL/L
CHOLEST/HDLC SERPL: 4.5 {RATIO}
CO2 SERPL-SCNC: 29 MMOL/L
CREAT SERPL-MCNC: 2.3 MG/DL
DIFFERENTIAL METHOD: ABNORMAL
EOSINOPHIL # BLD AUTO: 0.1 K/UL
EOSINOPHIL NFR BLD: 3.6 %
ERYTHROCYTE [DISTWIDTH] IN BLOOD BY AUTOMATED COUNT: 14.7 %
EST. GFR  (AFRICAN AMERICAN): 38.8 ML/MIN/1.73 M^2
EST. GFR  (NON AFRICAN AMERICAN): 33.5 ML/MIN/1.73 M^2
GLUCOSE SERPL-MCNC: 216 MG/DL
HCT VFR BLD AUTO: 29.9 %
HDL/CHOLESTEROL RATIO: 22 %
HDLC SERPL-MCNC: 141 MG/DL
HDLC SERPL-MCNC: 31 MG/DL
HGB BLD-MCNC: 9.6 G/DL
LDLC SERPL CALC-MCNC: 97.4 MG/DL
LYMPHOCYTES # BLD AUTO: 0.8 K/UL
LYMPHOCYTES NFR BLD: 21.4 %
MAGNESIUM SERPL-MCNC: 1.8 MG/DL
MCH RBC QN AUTO: 30 PG
MCHC RBC AUTO-ENTMCNC: 32.1 %
MCV RBC AUTO: 93 FL
MONOCYTES # BLD AUTO: 0.7 K/UL
MONOCYTES NFR BLD: 17 %
NEUTROPHILS # BLD AUTO: 2.2 K/UL
NEUTROPHILS NFR BLD: 56.7 %
NONHDLC SERPL-MCNC: 110 MG/DL
PLATELET # BLD AUTO: 277 K/UL
PMV BLD AUTO: 9.8 FL
POTASSIUM SERPL-SCNC: 4.2 MMOL/L
PROT SERPL-MCNC: 6.6 G/DL
RBC # BLD AUTO: 3.2 M/UL
SODIUM SERPL-SCNC: 139 MMOL/L
TRIGL SERPL-MCNC: 63 MG/DL
TSH SERPL DL<=0.005 MIU/L-ACNC: 0.77 UIU/ML
WBC # BLD AUTO: 3.88 K/UL

## 2017-01-23 PROCEDURE — 99999 PR PBB SHADOW E&M-EST. PATIENT-LVL II: CPT | Mod: PBBFAC,,, | Performed by: DERMATOLOGY

## 2017-01-23 PROCEDURE — 84443 ASSAY THYROID STIM HORMONE: CPT

## 2017-01-23 PROCEDURE — 86833 HLA CLASS II HIGH DEFIN QUAL: CPT

## 2017-01-23 PROCEDURE — 83880 ASSAY OF NATRIURETIC PEPTIDE: CPT

## 2017-01-23 PROCEDURE — 80197 ASSAY OF TACROLIMUS: CPT

## 2017-01-23 PROCEDURE — 86977 RBC SERUM PRETX INCUBJ/INHIB: CPT | Mod: 91

## 2017-01-23 PROCEDURE — 80053 COMPREHEN METABOLIC PANEL: CPT

## 2017-01-23 PROCEDURE — 86833 HLA CLASS II HIGH DEFIN QUAL: CPT | Mod: 91

## 2017-01-23 PROCEDURE — 36415 COLL VENOUS BLD VENIPUNCTURE: CPT | Mod: PO

## 2017-01-23 PROCEDURE — 86832 HLA CLASS I HIGH DEFIN QUAL: CPT | Mod: 91

## 2017-01-23 PROCEDURE — 80061 LIPID PANEL: CPT

## 2017-01-23 PROCEDURE — 83735 ASSAY OF MAGNESIUM: CPT

## 2017-01-23 PROCEDURE — 83036 HEMOGLOBIN GLYCOSYLATED A1C: CPT

## 2017-01-23 PROCEDURE — 85025 COMPLETE CBC W/AUTO DIFF WBC: CPT

## 2017-01-23 PROCEDURE — 86832 HLA CLASS I HIGH DEFIN QUAL: CPT

## 2017-01-23 PROCEDURE — 99203 OFFICE O/P NEW LOW 30 MIN: CPT | Mod: S$GLB,,, | Performed by: DERMATOLOGY

## 2017-01-23 RX ORDER — DOXEPIN HYDROCHLORIDE 50 MG/G
CREAM TOPICAL 2 TIMES DAILY PRN
Qty: 45 G | Refills: 3 | Status: ON HOLD | OUTPATIENT
Start: 2017-01-23 | End: 2017-03-13 | Stop reason: HOSPADM

## 2017-01-23 RX ORDER — MUPIROCIN 20 MG/G
OINTMENT TOPICAL
Qty: 30 G | Refills: 1 | Status: ON HOLD | OUTPATIENT
Start: 2017-01-23 | End: 2017-03-13 | Stop reason: HOSPADM

## 2017-01-23 NOTE — MR AVS SNAPSHOT
[facility] Dermatology  [address] [place] [address]  Phone: [phone]  Fax: [phone]                  Lv Crocker   2017 8:00 AM   Initial consult    Description:  Male : 1975   Provider:  Melani Varela MD   Department:  [facility] - Dermatology           Reason for Visit     Spot           Diagnoses this Visit        Comments    Multiple excoriations    -  Primary     Pruritus         History of bone transplant         Screening, malignant neoplasm, skin                To Do List           Future Appointments        Provider Department Dept Phone    2017 8:40 AM LABORATORY, [facility] [phone]    2017 9:00 AM Adolfo Robles MD [facility] - Hemotology Oncology [phone]    2017 9:30 AM CHAIR 04 [facility] [phone]    2017 3:00 PM Bonilla Zambrano MD [place] - Nephrology [phone]    3/13/2017 9:00 AM Monica Fernandez, DNP, NP [facility] - Endocrinology [phone]      Goals (5 Years of Data)     None      Follow-Up and Disposition     Return in about 1 year (around 2018).       These Medications        Disp Refills Start End    mupirocin (BACTROBAN) 2 % ointment 30 g 1 2017     AAA bid to sores for healing    [facility] [address] #: [phone]       doxepin (ZONALON) 5 % cream 45 g 3 2017     Apply topically 2 (two) times daily as needed for Itching. Use on intact skin - Topical (Top)    Pharmacy: [facility] - [address] #: [phone]         [facility]     [facility] Care Line -  Assistance  Registered nurses in the [facility] Center provide clinical advisement, health education, appointment booking, and other advisory services.  Call for this free service at [phone].             Medications           Message regarding Medications     Verify the changes and/or additions  "to your medication regime listed below are the same as discussed with your clinician today.  If any of these changes or additions are incorrect, please notify your healthcare provider.        START taking these NEW medications        Refills    mupirocin (BACTROBAN) 2 % ointment 1    Sig: AAA bid to sores for healing    Class: Normal    doxepin (ZONALON) 5 % cream 3    Sig: Apply topically 2 (two) times daily as needed for Itching. Use on intact skin    Class: Normal    Route: Topical (Top)           Verify that the below list of medications is an accurate representation of the medications you are currently taking.  If none reported, the list may be blank. If incorrect, please contact your healthcare provider. Carry this list with you in case of emergency.           Current Medications     alendronate (FOSAMAX) 70 MG tablet     aspirin (ECOTRIN) 81 MG EC tablet Take 1 tablet (81 mg total) by mouth once daily.    calcium-vitamin D3 500 mg(1,250mg) -200 unit per tablet Take 1 tablet by mouth once daily.    doxepin (ZONALON) 5 % cream Apply topically 2 (two) times daily as needed for Itching. Use on intact skin    escitalopram oxalate (LEXAPRO) 20 MG tablet Take 1 tablet (20 mg total) by mouth once daily.    gabapentin (NEURONTIN) 100 MG capsule Take 3 capsules (300 mg total) by mouth 3 (three) times daily.    insulin detemir (LEVEMIR FLEXTOUCH) 100 unit/mL (3 mL) SubQ InPn pen Inject 20 Units into the skin 2 (two) times daily.    insulin lispro (HUMALOG) 100 unit/mL injection Inject 10 Units into the skin 3 (three) times daily before meals.    insulin needles, disposable, 32 x 5/32 " Ndle 1 Device by Misc.(Non-Drug; Combo Route) route 5 (five) times daily.    lancets Misc 1 Device by Misc.(Non-Drug; Combo Route) route 4 (four) times daily with meals and nightly.    levothyroxine (SYNTHROID) 150 MCG tablet TAKE 1 TABLET BY MOUTH EVERY MORNING BEFORE BREAKFAST    magnesium oxide (MAG-OX) 400 mg tablet Take 1 tablet (400 " mg total) by mouth once daily.    mupirocin (BACTROBAN) 2 % ointment AAA bid to sores for healing    mycophenolate (MYFORTIC) 180 MG TbEC Take 4 tablets (720 mg total) by mouth 2 (two) times daily. S/P Heart Transplant 11/18/2014 ICD-10 Z94.1    nortriptyline (PAMELOR) 25 MG capsule Take 1 capsule (25 mg total) by mouth every evening.    pantoprazole (PROTONIX) 40 MG tablet TAKE ONE TABLET BY MOUTH EVERY DAY    pravastatin (PRAVACHOL) 40 MG tablet Take 1 tablet (40 mg total) by mouth every evening.    predniSONE (DELTASONE) 2.5 MG tablet Take 1 tablet (2.5 mg total) by mouth once daily. S/P Heart Transplant 11/18/2014 ICD-10 Z94.1    tacrolimus (PROGRAF) 1 MG Cap Taked 2mg orally in the morning and 3mg orally in the evening.   S/p heart transplant  11/18/2014  ICD-10 Z94.1    tamsulosin (FLOMAX) 0.4 mg Cp24 Take 2 capsules (0.8 mg total) by mouth every evening.    torsemide (DEMADEX) 20 MG Tab Take 2 tablets (40 mg total) by mouth once daily.           Clinical Reference Information           Allergies as of 1/23/2017     No Known Drug Allergies      Immunizations Administered on Date of Encounter - 1/23/2017     None      Maintenance Dialysis History     Patient has no recorded history of maintenance dialysis.      Transplant Information        Txp Date Organ Coordinator Care Team    11/18/2014 Heart Lisabecky Wilkinson Referring Physician:  Placido Tobias MD   Corresponding Physician:  Placido Tobias MD   Surgeon:  Prem Nath MD         Instructions    Preventing Skin Cancer  Relaxing in the sun may feel good. But it isnt good for your skin. In fact, being exposed to the suns harmful rays is a major cause of skin cancer. This is a serious disease that can be life-threatening. People of all ages and backgrounds are at risk. But in most cases, skin cancer can be prevented.    Your Role in Prevention  You can act today to help prevent skin cancer. Start by avoiding the suns UV (ultraviolet) rays. And  dont use tanning beds, which are no safer than the sun. Taking these steps can help keep you from getting skin cancer. It can also help prevent wrinkles and other sun-induced aging effects. Make sure your children also follow these safeguards. Now is the time to start taking preventive steps against skin cancer.  When You Are Outdoors  Protect your skin when you go outdoors during the day. Take precautions whenever you go out to eat, run errands by car or on foot, or do any outdoor activity. There isnt just one easy way to protect your skin. Its best to follow all of these steps:  · Wear tightly woven clothing that covers your skin. Put on a wide-brimmed hat to protect your face, ears, and scalp.  · Watch the clock. Try to avoid the sun between 10 a.m. and 4 p.m., when it is strongest.  · Head for the shade or create your own. Use an umbrella when sitting or strolling.  · Know that the suns rays can reflect off sand, water, and snow. This can harm your skin. Take extra care when you are near reflective surfaces.  · Keep in mind that even when the weather is hazy or cloudy, your skin can be exposed to strong UV rays.  · Shield your skin with sunscreen. Also, apply sunscreen to your childrens skin.  Tips for Using Sunscreen  To help prevent skin cancer, choose the right sunscreen and use it correctly. Try the following tips:  · Choose a sunscreen that has a sun protection factor (SPF) of at least 15. For the best protection, an SPF of at least 30 is preferred. Also, choose a sunscreen labeled broad spectrum. This will shield you from both UVA and UVB (ultraviolet A and B) rays.  · If one brand irritates your skin, try another, particularly ones without fragrance.  · Use a water-resistant sunscreen if swimming or sweating.  · Reapply sunscreen every 2 hours. If youre active, do this more often.  · Cover any sun-exposed skin, from your face to your feet. Dont forget your ears and your lips.  · Know that while  sunscreen helps protect you, it isnt enough. You should also wear protective clothing. And try to stay out of the sun as much as you can, especially from 10 a.m. to 4 p.m.  © 1823-5932 [employer]. [address] [place] [address], [place] [address]. All rights reserved. This information is not intended as a substitute for professional medical care. Always follow your healthcare professional's instructions.

## 2017-01-23 NOTE — PATIENT INSTRUCTIONS
Preventing Skin Cancer  Relaxing in the sun may feel good. But it isnt good for your skin. In fact, being exposed to the suns harmful rays is a major cause of skin cancer. This is a serious disease that can be life-threatening. People of all ages and backgrounds are at risk. But in most cases, skin cancer can be prevented.    Your Role in Prevention  You can act today to help prevent skin cancer. Start by avoiding the suns UV (ultraviolet) rays. And dont use tanning beds, which are no safer than the sun. Taking these steps can help keep you from getting skin cancer. It can also help prevent wrinkles and other sun-induced aging effects. Make sure your children also follow these safeguards. Now is the time to start taking preventive steps against skin cancer.  When You Are Outdoors  Protect your skin when you go outdoors during the day. Take precautions whenever you go out to eat, run errands by car or on foot, or do any outdoor activity. There isnt just one easy way to protect your skin. Its best to follow all of these steps:  · Wear tightly woven clothing that covers your skin. Put on a wide-brimmed hat to protect your face, ears, and scalp.  · Watch the clock. Try to avoid the sun between 10 a.m. and 4 p.m., when it is strongest.  · Head for the shade or create your own. Use an umbrella when sitting or strolling.  · Know that the suns rays can reflect off sand, water, and snow. This can harm your skin. Take extra care when you are near reflective surfaces.  · Keep in mind that even when the weather is hazy or cloudy, your skin can be exposed to strong UV rays.  · Shield your skin with sunscreen. Also, apply sunscreen to your childrens skin.  Tips for Using Sunscreen  To help prevent skin cancer, choose the right sunscreen and use it correctly. Try the following tips:  · Choose a sunscreen that has a sun protection factor (SPF) of at least 15. For the best protection, an SPF of at least 30 is preferred.  Also, choose a sunscreen labeled broad spectrum. This will shield you from both UVA and UVB (ultraviolet A and B) rays.  · If one brand irritates your skin, try another, particularly ones without fragrance.  · Use a water-resistant sunscreen if swimming or sweating.  · Reapply sunscreen every 2 hours. If youre active, do this more often.  · Cover any sun-exposed skin, from your face to your feet. Dont forget your ears and your lips.  · Know that while sunscreen helps protect you, it isnt enough. You should also wear protective clothing. And try to stay out of the sun as much as you can, especially from 10 a.m. to 4 p.m.  © 4525-4913 [employer]. [address] [place] [address], [place] [address]. All rights reserved. This information is not intended as a substitute for professional medical care. Always follow your healthcare professional's instructions.

## 2017-01-23 NOTE — LETTER
January 23, 2017      Jose A Lloyd MD  [address]  [place] [address]           [facility] Dermatology  [address] [place] [address]  Phone: [phone]  Fax: [phone]          Patient: Lv Crocker   MR Number: [id number]   YOB: 1975   Date of Visit: 1/23/2017       Dear Dr. Jose A Lloyd:    Thank you for referring Lv Crocker to me for evaluation. Attached you will find relevant portions of my assessment and plan of care.    If you have questions, please do not hesitate to call me. I look forward to following Lv Crocker along with you.    Sincerely,    Melani Varela MD    Enclosure  CC:  No Recipients    If you would like to receive this communication electronically, please contact [email or web address] or [phone] to request more information on [employer] Link access.    For providers and/or their staff who would like to refer a patient to [facility], please contact us through our one-stop-shop provider referral line, [facility], at [phone].    If you feel you have received this communication in error or would no longer like to receive these types of communications, please e-mail [email or web address]

## 2017-01-23 NOTE — PROGRESS NOTES
Subjective:       Patient ID:  Lv Crocker is a 41 y.o. male who presents for   Chief Complaint   Patient presents with    Spot     c/ o spots on forehead x 1 month     HPI Comments: Hx of heart transplant (in 2014) and CKD, currently on prograf, cellcept and prednisone, previously seen by Dr. Mayes    History of Present Illness: The patient presents with chief complaint of lesions.  Location: forheead  Duration: several months  Signs/Symptoms: pruritus    Prior treatments: none    The patient denies personal or family history of skin cancer.        Spot         Review of Systems   Constitutional: Negative for fever, chills, weight loss, fatigue, night sweats and malaise.   Gastrointestinal: Negative for indigestion.   Skin: Positive for itching. Negative for rash, daily sunscreen use and activity-related sunscreen use.   Hematologic/Lymphatic: Negative for adenopathy. Does not bruise/bleed easily.        Objective:    Physical Exam   Constitutional: He appears well-developed and well-nourished. No distress.   Neurological: He is alert and oriented to person, place, and time. He is not disoriented.   Psychiatric: He has a normal mood and affect.   Skin:   Areas Examined (abnormalities noted in diagram):   Scalp / Hair Palpated and Inspected  Head / Face Inspection Performed  Neck Inspection Performed  Chest / Axilla Inspection Performed  Abdomen Inspection Performed  Genitals / Buttocks / Groin Inspection Performed  Back Inspection Performed  RUE Inspected  LUE Inspection Performed  RLE Inspected  LLE Inspection Performed  Nails and Digits Inspection Performed                       Assessment / Plan:        Multiple excoriations  Pruritus  -     mupirocin (BACTROBAN) 2 % ointment; AAA bid to sores for healing  Dispense: 30 g; Refill: 1  -     doxepin (ZONALON) 5 % cream; Apply topically 2 (two) times daily as needed for Itching. Use on intact skin  Dispense: 45 g; Refill: 3  -     Recommend d/c  picking areas.  Will start above meds.     Screening, malignant neoplasm, skin  History of heart transplant  Reassurance given.  Discussed skin cancer and changes for patient to look for.  AAD Handout given. Discussed importance of daily use of sunscreen.  Recommend annual skin exam given immunosuppressives.            Return in about 1 year (around 1/23/2018).

## 2017-01-23 NOTE — TELEPHONE ENCOUNTER
----- Message from Adolfo Robles MD sent at 1/23/2017 10:26 AM CST -----  His recent lab work shows that he is mildly iron deficient.  Please schedule him for 2 doses of IV Injectafer.  I have put the order in.  I know he is scheduled to see me on Friday and is on the chemotherapy scheduled for possible Procrit.  Please see if he can be given his first dose of IV iron also on that day.  Thank you.

## 2017-01-24 LAB
ESTIMATED AVG GLUCOSE: 157 MG/DL
HBA1C MFR BLD HPLC: 7.1 %
TACROLIMUS BLD-MCNC: 4.3 NG/ML

## 2017-01-25 LAB
CLASS I ANTIBODY COMMENTS - LUMINEX: NORMAL
CLASS II ANTIBODY COMMENTS - LUMINEX: NORMAL
CPRA %: 0
CPRA %: 0
DSA1 TESTING DATE: NORMAL
DSA2 TESTING DATE: NORMAL
SERUM COLLECTION DT - LUMINEX CLASS I: NORMAL
SERUM COLLECTION DT - LUMINEX CLASS II: NORMAL

## 2017-01-27 ENCOUNTER — TELEPHONE (OUTPATIENT)
Dept: HEMATOLOGY/ONCOLOGY | Facility: CLINIC | Age: 42
End: 2017-01-27

## 2017-01-27 ENCOUNTER — TELEPHONE (OUTPATIENT)
Dept: TRANSPLANT | Facility: CLINIC | Age: 42
End: 2017-01-27

## 2017-01-27 LAB
C1Q1 TESTING DATE: NORMAL
C1Q2 TESTING DATE: NORMAL
CLASS I ANTIBODY COMMENTS - LUMINEX: NORMAL
CLASS II ANTIBODY COMMENTS - LUMINEX: NORMAL
SERUM COLLECTION DT - LUMINEX CLASS I: NORMAL
SERUM COLLECTION DT - LUMINEX CLASS II: NORMAL

## 2017-01-27 NOTE — TELEPHONE ENCOUNTER
Called concern of wife's condition, she will need IV antibiotics for a pocket, she is getting it drained in interventional radiology and will be in the Hospital for the next several days. I explained to pt to watch precautions with hand washing an mask while visited/ staying with her in the Hospital.

## 2017-01-30 ENCOUNTER — TELEPHONE (OUTPATIENT)
Dept: TRANSPLANT | Facility: CLINIC | Age: 42
End: 2017-01-30

## 2017-01-30 NOTE — TELEPHONE ENCOUNTER
Pt's wife was hospitalized last week after having Back surgery, she is home today and is feeling better.

## 2017-01-31 ENCOUNTER — TELEPHONE (OUTPATIENT)
Dept: HEMATOLOGY/ONCOLOGY | Facility: CLINIC | Age: 42
End: 2017-01-31

## 2017-01-31 NOTE — TELEPHONE ENCOUNTER
----- Message from Osmin Becker sent at 1/31/2017  1:42 PM CST -----  Pt is requesting a call from nurse to some questions and concerns regarding his health.          Please call pt back at [phone]

## 2017-02-07 ENCOUNTER — PATIENT MESSAGE (OUTPATIENT)
Dept: TRANSPLANT | Facility: CLINIC | Age: 42
End: 2017-02-07

## 2017-02-09 RX ORDER — LEVOTHYROXINE SODIUM 150 UG/1
TABLET ORAL
Qty: 30 TABLET | Refills: 0 | Status: SHIPPED | OUTPATIENT
Start: 2017-02-09 | End: 2017-03-20 | Stop reason: SDUPTHER

## 2017-02-10 ENCOUNTER — INFUSION (OUTPATIENT)
Dept: INFUSION THERAPY | Facility: HOSPITAL | Age: 42
End: 2017-02-10
Attending: INTERNAL MEDICINE
Payer: MEDICARE

## 2017-02-10 ENCOUNTER — OFFICE VISIT (OUTPATIENT)
Dept: HEMATOLOGY/ONCOLOGY | Facility: CLINIC | Age: 42
End: 2017-02-10
Payer: MEDICARE

## 2017-02-10 VITALS
DIASTOLIC BLOOD PRESSURE: 84 MMHG | TEMPERATURE: 99 F | WEIGHT: 186.75 LBS | RESPIRATION RATE: 18 BRPM | BODY MASS INDEX: 29.31 KG/M2 | HEART RATE: 108 BPM | SYSTOLIC BLOOD PRESSURE: 120 MMHG | HEIGHT: 67 IN | OXYGEN SATURATION: 99 %

## 2017-02-10 VITALS — SYSTOLIC BLOOD PRESSURE: 111 MMHG | DIASTOLIC BLOOD PRESSURE: 67 MMHG | HEART RATE: 100 BPM

## 2017-02-10 DIAGNOSIS — D63.1 ANEMIA OF CHRONIC RENAL FAILURE, STAGE 3 (MODERATE): ICD-10-CM

## 2017-02-10 DIAGNOSIS — D50.9 IRON DEFICIENCY ANEMIA, UNSPECIFIED IRON DEFICIENCY ANEMIA TYPE: Primary | ICD-10-CM

## 2017-02-10 DIAGNOSIS — N18.30 ANEMIA OF CHRONIC RENAL FAILURE, STAGE 3 (MODERATE): ICD-10-CM

## 2017-02-10 PROCEDURE — 25000003 PHARM REV CODE 250: Mod: PO | Performed by: INTERNAL MEDICINE

## 2017-02-10 PROCEDURE — 96365 THER/PROPH/DIAG IV INF INIT: CPT | Mod: PO

## 2017-02-10 PROCEDURE — 99214 OFFICE O/P EST MOD 30 MIN: CPT | Mod: S$GLB,,, | Performed by: INTERNAL MEDICINE

## 2017-02-10 PROCEDURE — 99999 PR PBB SHADOW E&M-EST. PATIENT-LVL V: CPT | Mod: PBBFAC,,, | Performed by: INTERNAL MEDICINE

## 2017-02-10 PROCEDURE — 63600175 PHARM REV CODE 636 W HCPCS: Mod: PO | Performed by: INTERNAL MEDICINE

## 2017-02-10 RX ORDER — SODIUM CHLORIDE 0.9 % (FLUSH) 0.9 %
10 SYRINGE (ML) INJECTION
Status: CANCELLED | OUTPATIENT
Start: 2017-02-17

## 2017-02-10 RX ORDER — SODIUM CHLORIDE 0.9 % (FLUSH) 0.9 %
10 SYRINGE (ML) INJECTION
Status: CANCELLED | OUTPATIENT
Start: 2017-02-10

## 2017-02-10 RX ORDER — HEPARIN 100 UNIT/ML
500 SYRINGE INTRAVENOUS
Status: CANCELLED | OUTPATIENT
Start: 2017-02-17

## 2017-02-10 RX ORDER — HEPARIN 100 UNIT/ML
500 SYRINGE INTRAVENOUS
Status: CANCELLED | OUTPATIENT
Start: 2017-02-10

## 2017-02-10 RX ADMIN — SODIUM CHLORIDE: 900 INJECTION, SOLUTION INTRAVENOUS at 09:02

## 2017-02-10 RX ADMIN — FERRIC CARBOXYMALTOSE INJECTION 750 MG: 50 INJECTION, SOLUTION INTRAVENOUS at 09:02

## 2017-02-10 NOTE — PLAN OF CARE
Problem: Patient Care Overview  Goal: Plan of Care Review  Outcome: Ongoing (interventions implemented as appropriate)  Pt states he feels well today, tired.

## 2017-02-10 NOTE — PROGRESS NOTES
Reason for visit: Chronic normocytic anemia    HPI:   The patient is a 41-year-old  male who presents to the clinic today for follow up to discuss further evaluation and management recommendations for chronic normocytic anemia.  The patient has had treatment of antibody mediated rejection with regard to his history of heart transplant.  During the course of that hospitalization he was also noted to be neutropenic and was also treated for C. difficile colitis.  Hem/Onc evaluated him and a bone marrow aspiration and biopsy was also performed.  He was given intermittent Neupogen injections for support with regard to his neutropenia.  The patient also had treatment for CMV infection and was treated with IV ganciclovir.  He was previously also on by mouth valganciclovir which has since been stopped.  He continues to be on Prograf, mycophenolate and prednisone for immunosuppression.    Today the patient reports that he continues on oral Demadex. He continues to have problems with volume status and abnormal kidney function.  The patient denies any fevers, chills or night sweats at this time.  He denies any melena, hematochezia, and emesis, hemoptysis or hematuria.  He denies any chest pain.  I have reviewed all of the details with regard to the patient's medical history available in Epic and have utilized this in my evaluation and management recommendations today.    PAST MEDICAL HISTORY:   1.  Type 1 diabetes mellitus  2.  Hypothyroidism due to Hashimoto's thyroiditis  3.  Chronic kidney disease stage III  4.  Restrictive cardiomyopathy  5.  CMV infection  6.  Chronic immunosuppression  7.  C. difficile colitis  8.  Osteopenia  9.  BPH  10. Traumatic compression fracture of T12    SURGICAL HISTORY:   1.  Bilateral arthroscopy of the knees  2.  Right ankle fusion  3.  Cholecystectomy    FAMILY HISTORY: The patient's brother  from complications related to pancreatic cancer at the age of 51.  He denies any other  immediate family members with cancer or bleeding/clotting disorders.    SOCIAL HISTORY: He does not smoke cigarettes.  He does not drink alcohol.  He has never used any recreational drugs.  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  He lives in [place], [place].    ALLERGIES: [NKDA]    MEDICATIONS: [Medcard has been reviewed and/or reconciled.]    REVIEW OF SYSTEMS:   GENERAL: [No fevers, chills or sweats. Reports chronic fatigue. Denies weight loss or loss of appetite.]  HEENT: [No blurred vision, tinnitus, nasal discharge, sorethroat or dysphagia.]  HEART: [No chest pain, palpitations or shortness of breath.]    LUNGS: [No cough, hemoptysis or breathing problems.]  ABDOMEN: [No abdominal pain, nausea, vomiting, diarrhea, constipation or melena.]  GENITOURINARY: [No dysuria, bleeding or malodorous discharge.]  NEURO: [No headache, dizziness or vertigo.]  HEMATOLOGY: [No easy bruising, spontaneous bleeding or blood clots in the past].  MUSCULOSKELETAL: [No arthralgias, myalgias or bone pains.]  SKIN: [No rashes or skin lesions.]  PSYCHIATRY: [No depression or anxiety.]    PHYSICAL EXAMINATION:   VS: Reviewed on nurse's notes.  APPEARANCE: The patient is a chronically ill appearing and well-groomed  male who appears in no acute distress.  HEENT: No scleral icterus. Both external auditory canals clear. No oral ulcers, lesions. Throat clear  HEAD: No sinus tenderness.  NECK: Supple. No palpable lymphadenopathy. Thyroid non-tender, no palpable masses  CHEST: Decreased breath sounds in the right lung likely due to pleural effusion.  No rales. No rhonchi. Unlabored respirations.  CARDIOVASCULAR: Normal S1, S2. Normal rate. Regular rhythm.  ABDOMEN: Bowel sounds normal. No tenderness. No palpable hepatosplenomegaly.  LYMPHATIC: No palpable supraclavicular, axillary nodes  EXTREMITIES: No clubbing, cyanosis.  2+ pitting edema of bilateral lower  extremities.  SKIN: No lesions. No petechiae. No ecchymoses. No induration or nodules  NEUROLOGIC: No focal findings. Alert & Oriented x 3. Mood appropriate to affect    LABS:   Reviewed    IMAGING:  Reviewed    IMPRESSION:  1.  Chronic normocytic anemia  2.  Orthotopic heart transplant with chronic immunosuppression  3.  Iron deficiency anemia  4.  Anemia due to chronic kidney disease    PLAN:  1.  I had a detailed discussion with the patient today with regard to his current clinical situation.  The results of his bone marrow aspiration and biopsy from May 2015 were previously discussed in detail with the patient.  He did have decreased storage iron.  He has been given 2 doses of IV iron in the past and tolerated them well. He is on Procrit injections as needed for treatment of anemia due to chronic kidney disease. There was no definitive evidence of leukemia or lymphoma noted on the results of his bone marrow aspiration and biopsy.  Cytogenetics were normal.  2.  He will be given 2 weekly doses of IV injectafer today because recent iron studies did show decreased iron saturation. I will hold procrit until iron is repleted.  3. GI consult to discuss endoscopic evaluation for iron deficiency.    Recheck labs on 3/20/17. I will determine additional management for his anemia after reviewing results. He knows to call sooner for any new problems or questions.    Adolfo Robles MD

## 2017-02-10 NOTE — MR AVS SNAPSHOT
"Patient Information     Patient Name Sex Lv Moura Male 1975      Visit Information        Provider Department Dept Phone Center    2/10/2017 9:00 AM [facility] Chemo Infusion [facility] Chemotherapy Infusion [phone] [facility]      Patient Instructions      [facility] [place]  [address] [facility] [address]  [address] [address]  [phone] phone     [phone] fax  Hours of Operation: Monday- Friday 8:00am- 5:00pm  After hours phone  [phone]  Hematology / Oncology Physicians on call      Dr. Zane Thomson    Please call with any concerns regarding your appointment today.       Your Current Medications Are     alendronate (FOSAMAX) 70 MG tablet    aspirin (ECOTRIN) 81 MG EC tablet    calcium-vitamin D3 500 mg(1,250mg) -200 unit per tablet    doxepin (ZONALON) 5 % cream    escitalopram oxalate (LEXAPRO) 20 MG tablet    gabapentin (NEURONTIN) 100 MG capsule    insulin detemir (LEVEMIR FLEXTOUCH) 100 unit/mL (3 mL) SubQ InPn pen    insulin lispro (HUMALOG) 100 unit/mL injection    insulin needles, disposable, 32 x 5/32 " Ndle    lancets Misc    levothyroxine (SYNTHROID) 150 MCG tablet    magnesium oxide (MAG-OX) 400 mg tablet    mupirocin (BACTROBAN) 2 % ointment    mycophenolate (MYFORTIC) 180 MG TbEC    nortriptyline (PAMELOR) 25 MG capsule    pantoprazole (PROTONIX) 40 MG tablet    pravastatin (PRAVACHOL) 40 MG tablet    predniSONE (DELTASONE) 2.5 MG tablet    tacrolimus (PROGRAF) 1 MG Cap    tamsulosin (FLOMAX) 0.4 mg Cp24    torsemide (DEMADEX) 20 MG Tab      Facility-Administered Medications     ferric carboxymaltose (INJECTAFER) 750 mg in sodium chloride 0.9% 250 mL infusion    sodium chloride 0.9% 100 mL flush bag    ferric carboxymaltose (INJECTAFER) 750 mg in sodium chloride 0.9% 250 mL IVPB (ready to mix system) (Discontinued)      Appointments for Next Year     2017  9:00 AM CONSULT (30 min.) [facility] " Jeffery Mckeon PA-C    Arrive at check-in approximately 15 minutes before your scheduled appointment time. Bring all outside medical records and imaging, along with a list of your current medications and insurance card.    (off [facility]) 2nd floor    2/17/2017 10:00 AM INFUSION 090 MIN (90 min.) [facility] CHAIR 09 [facility]    Arrive at check-in approximately 15 minutes before your scheduled appointment time. Bring all outside medical records and imaging, along with a list of your current medications and insurance card.    3/13/2017  9:00 AM EST PATIENT - ENDOCRINE (30 min.) [facility] Monica Fernandez, RA, NP    Arrive at check-in approximately 15 minutes before your scheduled appointment time. Bring all outside medical records and imaging, along with a list of your current medications and insurance card.    6th Floor    3/20/2017 10:00 AM NON FASTING LAB (10 min.) [facility] LABORATORYSCOOTER    Arrive at check-in approximately 15 minutes before your scheduled appointment time. Bring all outside medical records and imaging, along with a list of your current medications and insurance card.    3/20/2017 10:10 AM URINE (10 min.) [facility] SPECIMEN LABSCOOTER    Arrive at check-in approximately 15 minutes before your scheduled appointment time. Bring all outside medical records and imaging, along with a list of your current medications and insurance card.    3/24/2017 10:30 AM ESTABLISHED PATIENT (30 min.) [facility] Bonilla Zambrano MD    Arrive at check-in approximately 15 minutes before your scheduled appointment time. Bring all outside medical records and imaging, along with a list of your current medications and insurance card.    (off [facility]) 2nd floor         Default Flowsheet Data (last 24 hours)      Amb Complex Vitals Rene        02/10/17 0936 02/10/17 0913 02/10/17 0831          Measurements    Weight   84.7 kg (186 lb 11.7 oz)    "   Height   5' 7" (1.702 m)      BSA (Calculated - sq m)   2 sq meters      BMI (Calculated)   29.3      /64  120/84      Temp   98.7 °F (37.1 °C)      Pulse 101  108      Resp   18      SpO2   99 %      Pain Assessment    Pain Score  Four Three      Pain Frequency 2  Continuous       Pain Loc  SHOULDER   right SHOULDER              Allergies     No Known Drug Allergies       Medications You Received from 02/09/2017 1044 to 02/10/2017 1044        Date/Time Order Dose Route Action     02/10/2017 0938 ferric carboxymaltose (INJECTAFER) 750 mg in sodium chloride 0.9% 250 mL infusion 750 mg Intravenous New Bag     02/10/2017 0936 sodium chloride 0.9% 100 mL flush bag   Intravenous New Bag      Current Discharge Medication List     Cannot display discharge medications since this is not an admission.      "

## 2017-02-10 NOTE — PATIENT INSTRUCTIONS
[facility]  [address]  [address] [address]  [phone] phone     [phone] fax  Hours of Operation: Monday- Friday 8:00am- 5:00pm  After hours phone  [phone]  Hematology / Oncology Physicians on call      Dr. Zane Thomson    Please call with any concerns regarding your appointment today.

## 2017-02-17 ENCOUNTER — OFFICE VISIT (OUTPATIENT)
Dept: GASTROENTEROLOGY | Facility: CLINIC | Age: 42
End: 2017-02-17
Payer: MEDICARE

## 2017-02-17 ENCOUNTER — INFUSION (OUTPATIENT)
Dept: INFUSION THERAPY | Facility: HOSPITAL | Age: 42
End: 2017-02-17
Attending: INTERNAL MEDICINE
Payer: MEDICARE

## 2017-02-17 ENCOUNTER — PATIENT MESSAGE (OUTPATIENT)
Dept: TRANSPLANT | Facility: CLINIC | Age: 42
End: 2017-02-17

## 2017-02-17 VITALS
DIASTOLIC BLOOD PRESSURE: 70 MMHG | BODY MASS INDEX: 29.41 KG/M2 | SYSTOLIC BLOOD PRESSURE: 102 MMHG | WEIGHT: 187.38 LBS | HEIGHT: 67 IN

## 2017-02-17 VITALS
TEMPERATURE: 97 F | RESPIRATION RATE: 18 BRPM | HEART RATE: 104 BPM | SYSTOLIC BLOOD PRESSURE: 124 MMHG | DIASTOLIC BLOOD PRESSURE: 76 MMHG

## 2017-02-17 DIAGNOSIS — K74.69 CRYPTOGENIC CIRRHOSIS: Primary | ICD-10-CM

## 2017-02-17 DIAGNOSIS — D50.9 IRON DEFICIENCY ANEMIA, UNSPECIFIED IRON DEFICIENCY ANEMIA TYPE: Primary | ICD-10-CM

## 2017-02-17 DIAGNOSIS — R18.8 OTHER ASCITES: ICD-10-CM

## 2017-02-17 DIAGNOSIS — D50.9 IRON DEFICIENCY ANEMIA, UNSPECIFIED IRON DEFICIENCY ANEMIA TYPE: ICD-10-CM

## 2017-02-17 PROCEDURE — 63600175 PHARM REV CODE 636 W HCPCS: Mod: PO | Performed by: INTERNAL MEDICINE

## 2017-02-17 PROCEDURE — 99999 PR PBB SHADOW E&M-EST. PATIENT-LVL III: CPT | Mod: PBBFAC,,, | Performed by: INTERNAL MEDICINE

## 2017-02-17 PROCEDURE — 25000003 PHARM REV CODE 250: Mod: PO | Performed by: INTERNAL MEDICINE

## 2017-02-17 PROCEDURE — 99215 OFFICE O/P EST HI 40 MIN: CPT | Mod: S$GLB,,, | Performed by: INTERNAL MEDICINE

## 2017-02-17 PROCEDURE — 96365 THER/PROPH/DIAG IV INF INIT: CPT | Mod: PO

## 2017-02-17 RX ADMIN — SODIUM CHLORIDE: 0.9 INJECTION, SOLUTION INTRAVENOUS at 09:02

## 2017-02-17 RX ADMIN — FERRIC CARBOXYMALTOSE INJECTION 750 MG: 50 INJECTION, SOLUTION INTRAVENOUS at 09:02

## 2017-02-17 NOTE — PLAN OF CARE
Problem: Patient Care Overview  Goal: Plan of Care Review  Outcome: Ongoing (interventions implemented as appropriate)  Pt states that he feels tired

## 2017-02-17 NOTE — PATIENT INSTRUCTIONS
.  [facility]  [address]  [address] [address]  [phone] phone     [phone] fax  Hours of Operation: Monday- Friday 8:00am- 5:00pm  After hours phone  [phone]  Hematology / Oncology Physicians on call      Dr. Zane Thomson    Please call with any concerns regarding your appointment today.

## 2017-02-17 NOTE — LETTER
February 17, 2017      Adolfo Robles MD  [address] [place] [address]           [facility]  [address] [place] [address]  Phone: [phone]  Fax: [phone]          Patient: Lv Crocker   MR Number: [id number]   YOB: 1975   Date of Visit: 2/17/2017       Dear Dr. Adolfo Robles:    Thank you for referring Lv Crocker to me for evaluation. Attached you will find relevant portions of my assessment and plan of care.    If you have questions, please do not hesitate to call me. I look forward to following Lv Crocker along with you.    Sincerely,    Rukhsana Zuluaga MD    Enclosure  CC:  No Recipients    If you would like to receive this communication electronically, please contact [email or web address] or [phone] to request more information on [facility] access.    For providers and/or their staff who would like to refer a patient to [facility], please contact us through our one-stop-shop provider referral line, [facility], at [phone].    If you feel you have received this communication in error or would no longer like to receive these types of communications, please e-mail [email or web address]

## 2017-02-17 NOTE — MR AVS SNAPSHOT
[facility]  Gastroenterology  [address] [address]  Phone: [phone]  Fax: [phone]                  Lv Crocker   2017 8:00 AM   Office Visit    Description:  Male : 1975   Provider:  Rukhsana Zuluaga MD   Department:  [facility] - Gastroenterology           Reason for Visit     Anemia           Diagnoses this Visit        Comments    Cryptogenic cirrhosis    -  Primary     Iron deficiency anemia, unspecified iron deficiency anemia type         Other ascites                To Do List           Future Appointments        Provider Department Dept Phone    2017 10:00 AM CHAIR 09 [facility] [phone]    2017 11:30 AM LAB, SAME DAY [facility] [phone]    2017 1:00 PM [facility] US1 [facility] - [phone]    3/13/2017 9:00 AM Monica Fernandez, RA, NP [facility] - Endocrinology [phone]    3/20/2017 10:00 AM LABORATORY, [facility]       Your Future Surgeries/Procedures     Mar 13, 2017   Surgery with Rukhsana Zuluaga MD   [facility]  ([facility])    [address] [address] [address] [phone]              Goals (5 Years of Data)     None      Follow-Up and Disposition     Return in about 2 weeks (around 3/3/2017).    Follow-up and Disposition History      [facility]     [facility] Care [facility] -  Assistance  Registered nurses in the [facility] provide clinical advisement, health education, appointment booking, and other advisory services.  Call for this free service at [phone].             Medications           Message regarding Medications     Verify the changes and/or additions to your medication regime listed below are the same as discussed with your clinician today.  If any of these changes or additions are incorrect, please notify your healthcare provider.             Verify that the  "below list of medications is an accurate representation of the medications you are currently taking.  If none reported, the list may be blank. If incorrect, please contact your healthcare provider. Carry this list with you in case of emergency.           Current Medications     alendronate (FOSAMAX) 70 MG tablet     aspirin (ECOTRIN) 81 MG EC tablet Take 1 tablet (81 mg total) by mouth once daily.    calcium-vitamin D3 500 mg(1,250mg) -200 unit per tablet Take 1 tablet by mouth once daily.    doxepin (ZONALON) 5 % cream Apply topically 2 (two) times daily as needed for Itching. Use on intact skin    escitalopram oxalate (LEXAPRO) 20 MG tablet Take 1 tablet (20 mg total) by mouth once daily.    gabapentin (NEURONTIN) 100 MG capsule Take 3 capsules (300 mg total) by mouth 3 (three) times daily.    insulin detemir (LEVEMIR FLEXTOUCH) 100 unit/mL (3 mL) SubQ InPn pen Inject 20 Units into the skin 2 (two) times daily.    insulin lispro (HUMALOG) 100 unit/mL injection Inject 10 Units into the skin 3 (three) times daily before meals.    insulin needles, disposable, 32 x 5/32 " Ndle 1 Device by Misc.(Non-Drug; Combo Route) route 5 (five) times daily.    lancets Misc 1 Device by Misc.(Non-Drug; Combo Route) route 4 (four) times daily with meals and nightly.    levothyroxine (SYNTHROID) 150 MCG tablet TAKE 1 TABLET BY MOUTH EVERY MORNING BEFORE BREAKFAST    magnesium oxide (MAG-OX) 400 mg tablet Take 1 tablet (400 mg total) by mouth once daily.    mupirocin (BACTROBAN) 2 % ointment AAA bid to sores for healing    mycophenolate (MYFORTIC) 180 MG TbEC Take 4 tablets (720 mg total) by mouth 2 (two) times daily. S/P Heart Transplant 11/18/2014 ICD-10 Z94.1    nortriptyline (PAMELOR) 25 MG capsule Take 1 capsule (25 mg total) by mouth every evening.    pantoprazole (PROTONIX) 40 MG tablet TAKE ONE TABLET BY MOUTH EVERY DAY    pravastatin (PRAVACHOL) 40 MG tablet Take 1 tablet (40 mg total) by mouth every evening.    predniSONE " "(DELTASONE) 2.5 MG tablet Take 1 tablet (2.5 mg total) by mouth once daily. S/P Heart Transplant 11/18/2014 ICD-10 Z94.1    tacrolimus (PROGRAF) 1 MG Cap Taked 2mg orally in the morning and 3mg orally in the evening.   S/p heart transplant  11/18/2014  ICD-10 Z94.1    tamsulosin (FLOMAX) 0.4 mg Cp24 Take 2 capsules (0.8 mg total) by mouth every evening.    torsemide (DEMADEX) 20 MG Tab Take 2 tablets (40 mg total) by mouth once daily.           Clinical Reference Information           Your Vitals Were     BP Height Weight BMI       102/70 5' 7" (1.702 m) 85 kg (187 lb 6.3 oz) 29.35 kg/m2       Blood Pressure          Most Recent Value    BP  102/70      Allergies as of 2/17/2017     No Known Drug Allergies      Immunizations Administered on Date of Encounter - 2/17/2017     None      Orders Placed During Today's Visit      Normal Orders This Visit    Case request GI: ESOPHAGOGASTRODUODENOSCOPY (EGD)     Future Labs/Procedures Expected by Expires    BEL  2/17/2017 4/18/2018    Anti-smooth muscle antibody  2/17/2017 4/18/2018    Hepatitis A antibody, IgG  2/17/2017 4/18/2018    Hepatitis B surface antibody  2/17/2017 4/18/2018    Hepatitis B surface antigen  2/17/2017 4/18/2018    Hepatitis C antibody  2/17/2017 4/18/2018    Immunoglobulins (IgG, IgA, IgM) Quantitative  2/17/2017 4/18/2018    US Paracentesis inc Imaging  2/17/2017 2/17/2018      Maintenance Dialysis History     Patient has no recorded history of maintenance dialysis.      Transplant Information        Txp Date Organ Coordinator Care Team    11/18/2014 Heart Lisa Wilkinson Referring Physician:  Placido Tobias MD   Corresponding Physician:  Placido Tobias MD   Surgeon:  Prem Nath MD         Language Assistance Services     ATTENTION: Language assistance services are available, free of charge. Please call [phone].      ATENCIÓN: Si habla español, tiene a hernanedz disposición servicios gratuitos de asistencia lingüística. Llame al " [phone].     TOMEKA Ý: N?u b?n nói Ti?ng Vi?t, có các d?ch v? h? tr? ngôn ng? mi?n phí dành cho b?n. G?i s? [phone].         [facility] - Gastroenterology complies with applicable Federal civil rights laws and does not discriminate on the basis of race, color, national origin, age, disability, or sex.

## 2017-02-17 NOTE — PROGRESS NOTES
Subjective:     Lv Crocker is here for evaluation of Anemia (referred by Dr. Robles)      HPI  Lv Crocker is here for evaluation of iron deficiency anemia.  He denies any issues of overt GI bleeding.  He did have a colonoscopy in 2015 which was overall unremarkable.  He has not had a recent upper endoscopy.  He does report a history of possibly peptic ulcer disease.  He takes aspirin but no other anti-inflammatories.    Of note he does have issues with ascites that has required paracenteses.  On his imaging he has a nodular contour of the liver suggestive of cirrhosis.  Denies any known history of liver disease and has never been biopsied.  He denies any family history of liver disease.  He has been type I diabetic since he was 6 years old.    I from the ascites he denies any issues of gastrointestinal bleeding or encephalopathy.    Review of Systems   Constitutional: Negative.    HENT: Negative.    Eyes: Negative.    Respiratory: Negative.    Cardiovascular: Negative.    Gastrointestinal: Positive for abdominal distention.   Genitourinary: Negative.    Musculoskeletal: Negative.    Skin: Negative.    Neurological: Negative.    Psychiatric/Behavioral: Negative.        Objective:     Physical Exam   Constitutional: He is oriented to person, place, and time. He appears well-developed and well-nourished. No distress.   HENT:   Head: Normocephalic and atraumatic.   Mouth/Throat: Oropharynx is clear and moist. No oropharyngeal exudate.   Eyes: Conjunctivae are normal. Pupils are equal, round, and reactive to light. Right eye exhibits no discharge. Left eye exhibits no discharge. No scleral icterus.   Pulmonary/Chest: Effort normal and breath sounds normal. No respiratory distress. He has no wheezes.   Abdominal: Soft. He exhibits distension (ascites). There is no tenderness.   Musculoskeletal: He exhibits no edema.   Neurological: He is alert and oriented to person, place, and time.    Psychiatric: He has a normal mood and affect. His behavior is normal.   Vitals reviewed.    Colon 5/2015  Impression:           - Erythematous mucosa in the rectum. Biopsied.                         Possibly due to the prep done for this procedure.                        - Multiple biopsies were obtained from the left and                         right colons.    US 12/2016    Morphologic changes of the liver consistent with cirrhosis.  NO solid hepatic mass is seen.    Sequela of portal hypertension including splenomegaly and a large RIGHT ascites.    Bilateral pleural effusions, RIGHT greater than LEFT.    Cholecystectomy.    MELD-Na score: 19 at 12/19/2016  5:30 AM  MELD score: 16 at 12/19/2016  5:30 AM  Calculated from:  Serum Creatinine: 2.8 mg/dL at 12/19/2016  5:30 AM  Serum Sodium: 133 mmol/L at 12/19/2016  5:30 AM  Total Bilirubin: 0.8 mg/dL (Rounded to 1) at 12/19/2016  5:30 AM  INR(ratio): 1.0 at 12/18/2016  5:30 AM  Age: 41 years    WBC   Date Value Ref Range Status   02/10/2017 4.34 3.90 - 12.70 K/uL Final     Hemoglobin   Date Value Ref Range Status   02/10/2017 10.2 (L) 14.0 - 18.0 g/dL Final     POC Hematocrit   Date Value Ref Range Status   11/21/2014 24 (L) 36 - 54 %PCV Final     Hematocrit   Date Value Ref Range Status   02/10/2017 31.1 (L) 40.0 - 54.0 % Final     Platelets   Date Value Ref Range Status   02/10/2017 223 150 - 350 K/uL Final     BUN, Bld   Date Value Ref Range Status   01/23/2017 31 (H) 6 - 20 mg/dL Final     Creatinine   Date Value Ref Range Status   01/23/2017 2.3 (H) 0.5 - 1.4 mg/dL Final     Glucose   Date Value Ref Range Status   01/23/2017 216 (H) 70 - 110 mg/dL Final     Calcium   Date Value Ref Range Status   01/23/2017 9.3 8.7 - 10.5 mg/dL Final     Sodium   Date Value Ref Range Status   01/23/2017 139 136 - 145 mmol/L Final     Potassium   Date Value Ref Range Status   01/23/2017 4.2 3.5 - 5.1 mmol/L Final     Chloride   Date Value Ref Range Status   01/23/2017 100 95 -  110 mmol/L Final     Magnesium   Date Value Ref Range Status   01/23/2017 1.8 1.6 - 2.6 mg/dL Final     AST   Date Value Ref Range Status   01/23/2017 18 10 - 40 U/L Final     ALT   Date Value Ref Range Status   01/23/2017 16 10 - 44 U/L Final     Alkaline Phosphatase   Date Value Ref Range Status   01/23/2017 158 (H) 55 - 135 U/L Final     Total Bilirubin   Date Value Ref Range Status   01/23/2017 0.7 0.1 - 1.0 mg/dL Final     Comment:     For infants and newborns, interpretation of results should be based  on gestational age, weight and in agreement with clinical  observations.  Premature Infant recommended reference ranges:  Up to 24 hours.............<8.0 mg/dL  Up to 48 hours............<12.0 mg/dL  3-5 days..................<15.0 mg/dL  6-29 days.................<15.0 mg/dL       Albumin   Date Value Ref Range Status   01/23/2017 3.5 3.5 - 5.2 g/dL Final     INR   Date Value Ref Range Status   01/16/2017 1.0 0.8 - 1.2 Final     Comment:     Coumadin Therapy:  2.0 - 3.0 for INR for all indicators except mechanical heart valves  and antiphospholipid syndromes which should use 2.5 - 3.5.           Assessment/Plan:     1. Cryptogenic cirrhosis    2. Iron deficiency anemia, unspecified iron deficiency anemia type    3. Other ascites      Lv Crocker is a 41 y.o. male withAnemia (referred by Dr. Robles)    Cryptogenic cirrhosis-concerned that the patient does have cirrhosis based on imaging as well as recurrent ascites.  The exact etiology is unclear.  It is possible this developed in relation to Sims given his long history of insulin resistance also could be related to his history of heart failure or both  -We'll evalfor causes of liver disease  -Needs variceal screening  -Continue with paracenteses as needed for now will order both diagnostic and therapeutic  -     Hepatitis A antibody, IgG; Future; Expected date: 2/17/17  -     Immunoglobulins (IgG, IgA, IgM) Quantitative; Future; Expected date:  2/17/17  -     Anti-smooth muscle antibody; Future; Expected date: 2/17/17  -     BEL; Future; Expected date: 2/17/17  -     Hepatitis B surface antibody; Future; Expected date: 2/17/17  -     Hepatitis B surface antigen; Future; Expected date: 2/17/17  -     Hepatitis C antibody; Future; Expected date: 2/17/17  -     Case request GI: ESOPHAGOGASTRODUODENOSCOPY (EGD)    Iron deficiency anemia, unspecified iron deficiency anemia type-low concern for significant GI lesion.  His anemia could be related to his cirrhosis as there is evidence on imaging of splenomegaly.  He could be having small oozing of blood related to portal hypertension.  -     Case request GI: ESOPHAGOGASTRODUODENOSCOPY (EGD)    Other ascites  -We'll evaluate for causes with diagnostic paracentesis  -     US Paracentesis inc Imaging; Future; Expected date: 2/17/17    Return to clinic in 2-4 weeks after above testing is complete      Rukhsana Zuluaga MD

## 2017-02-17 NOTE — MR AVS SNAPSHOT
"Patient Information     Patient Name Sex Lv Moura Male 1975      Visit Information        Provider Department Dept Phone Center    2017 10:00 AM [facility] Chemo Infusion [facility]fusion [phone] [facility]      Patient Instructions    .  [facility]  [address] [facility] [address]  [address] [address]  [phone] phone     [phone] fax  Hours of Operation: Monday- Friday 8:00am- 5:00pm  After hours phone  [phone]  Hematology / Oncology Physicians on call      Dr. Zane Thomson    Please call with any concerns regarding your appointment today.         Your Current Medications Are     alendronate (FOSAMAX) 70 MG tablet    aspirin (ECOTRIN) 81 MG EC tablet    calcium-vitamin D3 500 mg(1,250mg) -200 unit per tablet    doxepin (ZONALON) 5 % cream    escitalopram oxalate (LEXAPRO) 20 MG tablet    gabapentin (NEURONTIN) 100 MG capsule    insulin detemir (LEVEMIR FLEXTOUCH) 100 unit/mL (3 mL) SubQ InPn pen    insulin lispro (HUMALOG) 100 unit/mL injection    insulin needles, disposable, 32 x 5/32 " Ndle    lancets Misc    levothyroxine (SYNTHROID) 150 MCG tablet    magnesium oxide (MAG-OX) 400 mg tablet    mupirocin (BACTROBAN) 2 % ointment    mycophenolate (MYFORTIC) 180 MG TbEC    nortriptyline (PAMELOR) 25 MG capsule    pantoprazole (PROTONIX) 40 MG tablet    pravastatin (PRAVACHOL) 40 MG tablet    predniSONE (DELTASONE) 2.5 MG tablet    tacrolimus (PROGRAF) 1 MG Cap    tamsulosin (FLOMAX) 0.4 mg Cp24    torsemide (DEMADEX) 20 MG Tab      Facility-Administered Medications     ferric carboxymaltose (INJECTAFER) 750 mg in sodium chloride 0.9% 250 mL infusion    sodium chloride 0.9% 100 mL flush bag      Appointments for Next Year     2017 11:30 AM NON FASTING LAB (15 min.) [facility] LAB, SAME DAY [place]    Arrive at check-in approximately 15 minutes before your scheduled appointment " time. Bring all outside medical records and imaging, along with a list of your current medications and insurance card.    (off [address]) 2nd floor    2/22/2017  1:00 PM US BIOPSY (30 min.) [facility]  [facility] US1    Arrive at check-in approximately 15 minutes before your scheduled appointment time. Bring all outside medical records and imaging, along with a list of your current medications and insurance card.    3/13/2017  9:00 AM EST PATIENT - ENDOCRINE (30 min.) [facility] Endocrinology Monica Fernandez, DNP, NP    Arrive at check-in approximately 15 minutes before your scheduled appointment time. Bring all outside medical records and imaging, along with a list of your current medications and insurance card.    6th Floor    3/20/2017 10:00 AM NON FASTING LAB (10 min.) [facility] LABORATORYSCOOTER    Arrive at check-in approximately 15 minutes before your scheduled appointment time. Bring all outside medical records and imaging, along with a list of your current medications and insurance card.    3/20/2017 10:10 AM URINE (10 min.) [facility] SPECIMEN LABSCOOTER    Arrive at check-in approximately 15 minutes before your scheduled appointment time. Bring all outside medical records and imaging, along with a list of your current medications and insurance card.    3/24/2017 10:30 AM ESTABLISHED PATIENT (30 min.) [facility] Nephrology Bonilla Zambrano MD    Arrive at check-in approximately 15 minutes before your scheduled appointment time. Bring all outside medical records and imaging, along with a list of your current medications and insurance card.    (off [address]) 2nd floor    3/29/2017  2:30 PM LIVER - EP (30 min.) [facility] - Gastroenterology Rukhsana Zuluaga MD    Arrive at check-in approximately 15 minutes before your scheduled appointment time. Bring all outside medical records and imaging, along with a list of your current medications and insurance card.    (off  [facility] 3rd flood         Default Flowsheet Data (last 24 hours)      Amb Complex Vitals Rene        02/17/17 1058 02/17/17 0944 02/17/17 0828          Measurements    Weight   85 kg (187 lb 6.3 oz)      Height   5' 7" (1.702 m)      BSA (Calculated - sq m)   2 sq meters      BMI (Calculated)   29.4      /76 134/87 102/70      Temp  97.1 °F (36.2 °C)       Pulse 104 106       Resp  18       Pain Assessment    Pain Score  Three Zero      Pain Loc  SHOULDER   right               Allergies     No Known Drug Allergies       Medications You Received from 02/16/2017 1100 to 02/17/2017 1100        Date/Time Order Dose Route Action     02/17/2017 0956 ferric carboxymaltose (INJECTAFER) 750 mg in sodium chloride 0.9% 250 mL infusion 750 mg Intravenous New Bag     02/17/2017 0950 sodium chloride 0.9% 100 mL flush bag   Intravenous New Bag      Current Discharge Medication List     Cannot display discharge medications since this is not an admission.      "

## 2017-02-22 ENCOUNTER — PATIENT MESSAGE (OUTPATIENT)
Dept: TRANSPLANT | Facility: CLINIC | Age: 42
End: 2017-02-22

## 2017-02-22 ENCOUNTER — HOSPITAL ENCOUNTER (OUTPATIENT)
Dept: RADIOLOGY | Facility: HOSPITAL | Age: 42
Discharge: HOME | End: 2017-02-22
Attending: INTERNAL MEDICINE
Payer: MEDICARE

## 2017-02-22 DIAGNOSIS — Z23 NEED FOR HEPATITIS A AND B VACCINATION: Primary | ICD-10-CM

## 2017-02-22 DIAGNOSIS — R18.8 OTHER ASCITES: ICD-10-CM

## 2017-02-22 LAB
APTT BLDCRRT: 27.5 SEC
BASOPHILS # BLD AUTO: 0.03 K/UL
BASOPHILS NFR BLD: 0.6 %
DIFFERENTIAL METHOD: ABNORMAL
EOSINOPHIL # BLD AUTO: 0.2 K/UL
EOSINOPHIL NFR BLD: 3 %
ERYTHROCYTE [DISTWIDTH] IN BLOOD BY AUTOMATED COUNT: 13.9 %
HCT VFR BLD AUTO: 29.5 %
HGB BLD-MCNC: 9.8 G/DL
INR PPP: 1.1
LYMPHOCYTES # BLD AUTO: 0.9 K/UL
LYMPHOCYTES NFR BLD: 17.8 %
MCH RBC QN AUTO: 29.6 PG
MCHC RBC AUTO-ENTMCNC: 33.2 %
MCV RBC AUTO: 89 FL
MONOCYTES # BLD AUTO: 0.7 K/UL
MONOCYTES NFR BLD: 13.6 %
NEUTROPHILS # BLD AUTO: 3.3 K/UL
NEUTROPHILS NFR BLD: 65 %
PLATELET # BLD AUTO: 247 K/UL
PMV BLD AUTO: 9.7 FL
PROTHROMBIN TIME: 11.2 SEC
RBC # BLD AUTO: 3.31 M/UL
WBC # BLD AUTO: 5.01 K/UL

## 2017-02-22 PROCEDURE — 85025 COMPLETE CBC W/AUTO DIFF WBC: CPT

## 2017-02-22 PROCEDURE — 85730 THROMBOPLASTIN TIME PARTIAL: CPT

## 2017-02-22 PROCEDURE — 85610 PROTHROMBIN TIME: CPT

## 2017-02-23 ENCOUNTER — TELEPHONE (OUTPATIENT)
Dept: GASTROENTEROLOGY | Facility: CLINIC | Age: 42
End: 2017-02-23

## 2017-02-23 ENCOUNTER — TELEPHONE (OUTPATIENT)
Dept: TRANSPLANT | Facility: CLINIC | Age: 42
End: 2017-02-23

## 2017-02-23 ENCOUNTER — HOSPITAL ENCOUNTER (OUTPATIENT)
Dept: RADIOLOGY | Facility: HOSPITAL | Age: 42
Discharge: HOME | End: 2017-02-23
Attending: INTERNAL MEDICINE
Payer: MEDICARE

## 2017-02-23 VITALS
TEMPERATURE: 98 F | DIASTOLIC BLOOD PRESSURE: 72 MMHG | BODY MASS INDEX: 29.03 KG/M2 | OXYGEN SATURATION: 99 % | HEART RATE: 102 BPM | SYSTOLIC BLOOD PRESSURE: 110 MMHG | WEIGHT: 185 LBS | HEIGHT: 67 IN | RESPIRATION RATE: 18 BRPM

## 2017-02-23 LAB
ALBUMIN FLD-MCNC: 2.9 G/DL
APPEARANCE FLD: NORMAL
BODY FLD TYPE: NORMAL
COLOR FLD: YELLOW
LYMPHOCYTES NFR FLD MANUAL: 48 %
MONOS+MACROS NFR FLD MANUAL: 48 %
NEUTROPHILS NFR FLD MANUAL: 4 %
PATH INTERP FLD-IMP: NORMAL
PROT FLD-MCNC: 5 G/DL
SPECIMEN SOURCE: NORMAL
SPECIMEN SOURCE: NORMAL
WBC # FLD: 655 /CU MM

## 2017-02-23 PROCEDURE — C1729 CATH, DRAINAGE: HCPCS

## 2017-02-23 PROCEDURE — 84157 ASSAY OF PROTEIN OTHER: CPT

## 2017-02-23 PROCEDURE — 82042 OTHER SOURCE ALBUMIN QUAN EA: CPT

## 2017-02-23 PROCEDURE — 89051 BODY FLUID CELL COUNT: CPT

## 2017-02-23 PROCEDURE — A7048 VACUUM DRAIN BOTTLE/TUBE KIT: HCPCS

## 2017-02-23 NOTE — IP AVS SNAPSHOT
[facility]  [address] [place] [address] [address] [place] [id number]           Patient Discharge Instructions     Our goal is to set you up for success. This packet includes information on your condition, medications, and your home care. It will help you to care for yourself so you don't get sicker and need to go back to the hospital.     Please ask your nurse if you have any questions.        There are many details to remember when preparing to leave the hospital. Here is what you will need to do:    1. Take your medicine. If you are prescribed medications, review your Medication List in the following pages. You may have new medications to  at the pharmacy and others that you'll need to stop taking. Review the instructions for how and when to take your medications. Talk with your doctor or nurses if you are unsure of what to do.     2. Go to your follow-up appointments. Specific follow-up information is listed in the following pages. Your may be contacted by a transition nurse or clinical provider about future appointments. Be sure we have all of the phone numbers to reach you, if needed. Please contact your provider's office if you are unable to make an appointment.     3. Watch for warning signs. Your doctor or nurse will give you detailed warning signs to watch for and when to call for assistance. These instructions may also include educational information about your condition. If you experience any of warning signs to your health, call your doctor.               [facility]  Unless otherwise directed by your provider, please contact [facility], our nurse care line that is available for 24/7 assistance.     [phone] (toll-free)    Registered nurses in the [facility] provide clinical advisement, health education, appointment booking, and other advisory services.                    ** Verify the list of medication(s) below is accurate and up to date. Carry this with you  in case of emergency. If your medications have changed, please notify your healthcare provider.             Medication List      TAKE these medications        Additional Info                      alendronate 70 MG tablet   Commonly known as:  FOSAMAX   Refills:  0      Begin Date    AM    Noon    PM    Bedtime       aspirin 81 MG EC tablet   Commonly known as:  ECOTRIN   Quantity:  30 tablet   Refills:  11   Dose:  81 mg    Instructions:  Take 1 tablet (81 mg total) by mouth once daily.     Begin Date    AM    Noon    PM    Bedtime       calcium-vitamin D3 500 mg(1,250mg) -200 unit per tablet   Quantity:  60 tablet   Refills:  11   Dose:  1 tablet    Instructions:  Take 1 tablet by mouth once daily.     Begin Date    AM    Noon    PM    Bedtime       doxepin 5 % cream   Commonly known as:  ZONALON   Quantity:  45 g   Refills:  3    Instructions:  Apply topically 2 (two) times daily as needed for Itching. Use on intact skin     Begin Date    AM    Noon    PM    Bedtime       escitalopram oxalate 20 MG tablet   Commonly known as:  LEXAPRO   Quantity:  30 tablet   Refills:  11   Dose:  20 mg    Instructions:  Take 1 tablet (20 mg total) by mouth once daily.     Begin Date    AM    Noon    PM    Bedtime       gabapentin 100 MG capsule   Commonly known as:  NEURONTIN   Quantity:  360 capsule   Refills:  11   Dose:  300 mg    Instructions:  Take 3 capsules (300 mg total) by mouth 3 (three) times daily.     Begin Date    AM    Noon    PM    Bedtime       insulin detemir 100 unit/mL (3 mL) Inpn pen   Commonly known as:  LEVEMIR FLEXTOUCH   Quantity:  15 mL   Refills:  12   Dose:  20 Units    Instructions:  Inject 20 Units into the skin 2 (two) times daily.     Begin Date    AM    Noon    PM    Bedtime       insulin lispro 100 unit/mL injection   Commonly known as:  HUMALOG   Quantity:  15 mL   Refills:  12   Dose:  10 Units    Instructions:  Inject 10 Units into the skin 3 (three) times daily before meals.     Begin Date     "AM    Noon    PM    Bedtime       lancets Misc   Quantity:  100 each   Refills:  5   Dose:  1 Device   Comments:  ON HOLD, patient will request fill once discharged from SNF    Instructions:  1 Device by Misc.(Non-Drug; Combo Route) route 4 (four) times daily with meals and nightly.     Begin Date    AM    Noon    PM    Bedtime       levothyroxine 150 MCG tablet   Commonly known as:  SYNTHROID   Quantity:  30 tablet   Refills:  0    Instructions:  TAKE 1 TABLET BY MOUTH EVERY MORNING BEFORE BREAKFAST     Begin Date    AM    Noon    PM    Bedtime       magnesium oxide 400 mg tablet   Commonly known as:  MAG-OX   Quantity:  30 tablet   Refills:  11   Dose:  400 mg    Instructions:  Take 1 tablet (400 mg total) by mouth once daily.     Begin Date    AM    Noon    PM    Bedtime       mupirocin 2 % ointment   Commonly known as:  BACTROBAN   Quantity:  30 g   Refills:  1    Instructions:  AAA bid to sores for healing     Begin Date    AM    Noon    PM    Bedtime       mycophenolate 180 MG Tbec   Commonly known as:  MYFORTIC   Quantity:  240 tablet   Refills:  11   Dose:  720 mg    Instructions:  Take 4 tablets (720 mg total) by mouth 2 (two) times daily. S/P Heart Transplant 11/18/2014 ICD-10 Z94.1     Begin Date    AM    Noon    PM    Bedtime       nortriptyline 25 MG capsule   Commonly known as:  PAMELOR   Quantity:  30 capsule   Refills:  3   Dose:  25 mg    Instructions:  Take 1 capsule (25 mg total) by mouth every evening.     Begin Date    AM    Noon    PM    Bedtime       pantoprazole 40 MG tablet   Commonly known as:  PROTONIX   Quantity:  30 tablet   Refills:  11    Instructions:  TAKE ONE TABLET BY MOUTH EVERY DAY     Begin Date    AM    Noon    PM    Bedtime       pen needle, diabetic 32 gauge x 5/32" Ndle   Quantity:  200 each   Refills:  12   Dose:  1 Device    Instructions:  1 Device by Misc.(Non-Drug; Combo Route) route 5 (five) times daily.     Begin Date    AM    Noon    PM    Bedtime       pravastatin 40 " MG tablet   Commonly known as:  PRAVACHOL   Quantity:  30 tablet   Refills:  11   Dose:  40 mg    Instructions:  Take 1 tablet (40 mg total) by mouth every evening.     Begin Date    AM    Noon    PM    Bedtime       predniSONE 2.5 MG tablet   Commonly known as:  DELTASONE   Quantity:  30 tablet   Refills:  11   Dose:  2.5 mg    Instructions:  Take 1 tablet (2.5 mg total) by mouth once daily. S/P Heart Transplant 11/18/2014 ICD-10 Z94.1     Begin Date    AM    Noon    PM    Bedtime       tacrolimus 1 MG Cap   Commonly known as:  PROGRAF   Quantity:  150 capsule   Refills:  11    Instructions:  Taked 2mg orally in the morning and 3mg orally in the evening.  S/p heart transplant  11/18/2014  ICD-10 Z94.1     Begin Date    AM    Noon    PM    Bedtime       tamsulosin 0.4 mg Cp24   Commonly known as:  FLOMAX   Quantity:  60 capsule   Refills:  11   Dose:  0.8 mg    Instructions:  Take 2 capsules (0.8 mg total) by mouth every evening.     Begin Date    AM    Noon    PM    Bedtime       torsemide 20 MG Tab   Commonly known as:  DEMADEX   Quantity:  60 tablet   Refills:  11   Dose:  40 mg    Instructions:  Take 2 tablets (40 mg total) by mouth once daily.     Begin Date    AM    Noon    PM    Bedtime                  Please bring to all follow up appointments:    1. A copy of your discharge instructions.  2. All medicines you are currently taking in their original bottles.  3. Identification and insurance card.    Please arrive 15 minutes ahead of scheduled appointment time.    Please call 24 hours in advance if you must reschedule your appointment and/or time.        Your Scheduled Appointments     Mar 13, 2017  9:00 AM CDT   Established Patient with Monica Fernandez, RA, NP   [facility] Endocrinology ([facility] )    [address] [address]   [phone]            Mar 20, 2017 10:00 AM CDT   Non-Fasting Lab with LABORATORY, [facility]    [address]  [address]  [place] [address]               Mar 20, 2017 10:10 AM CDT   Urine with SPECIMEN LAB, [facility] [facility]    [address]  [place] [address]               Mar 24, 2017 10:30 AM CDT   Established Patient Visit with Bonilla Zambrano MD   [facility] ([facility])    [address] [id number] [phone]            Mar 29, 2017  2:30 PM CDT   Established Patient with Rukhsana Zuluaga MD   [facility] - Gastroenterology ([facility])    [address] [address] [phone]              Your Future Surgeries/Procedures     Mar 13, 2017   Surgery with Rukhsana Zuluaga MD   [facility] ([facility])    [address] [address] [phone]              Follow-up Information     Follow up with Rukhsana Zuluaga MD.    Specialty:  Gastroenterology    Why:  As needed    Contact information:    [address]  [facility] [address]  [phone]            Discharge Instructions         Discharge Instructions for Paracentesis  Paracentesis is a procedure to remove extra fluid from your belly (abdomen). This fluid buildup in the abdomen is called ascites. The procedure may have been done to take a sample of the fluid. Or, it may have been done to drain the extra fluid from your abdomen and help make you more comfortable.     Ascites is buildup of excess fluid in the abdomen.   Home care  · If you have pain after the procedure, your healthcare provider can prescribe or recommend pain medicines. Take these exactly as directed. If you stopped taking other medicines before the procedure, ask your provider when you can start them again.  · Take it easy for 24 hours after the procedure. Avoid physical activity until your provider says its OK.  · You will have a small bandage over the puncture site. Stitches (sutures), surgical staples, adhesive tapes, adhesive strips, or surgical glue may be  "used to close the incision. They also help stop bleeding and speed healing. You may take the bandage off in 24 hours.  · Check the puncture site for the signs of infection listed below.  Follow-up care  Make a follow-up appointment with your healthcare provider as directed. During your follow-up visit, your provider will check your healing. Let your provider know how you are feeling. You can also discuss the cause of your ascites and if you need any further treatment.  When to seek medical advice  Call your healthcare provider if you have any of the following after the procedure:  · A fever of 100.4°F (38.0°C) or higher  · Trouble breathing  · Pain that doesn't go away even after taking pain medicine  · Belly pain not caused by having the skin punctured  · Bleeding from the puncture site  · More than a small amount of fluid leaking from the puncture site  · Swollen belly  · Signs of infection at the puncture site. These include increased pain, redness, or swelling, warmth, or bad-smelling drainage.  · Blood in your urine  · Feeling dizzy or lightheaded, or fainting   Date Last Reviewed: 7/1/2016  © 8560-4522 [employer]. [address] [place] [address]. All rights reserved. This information is not intended as a substitute for professional medical care. Always follow your healthcare professional's instructions.            Admission Information     Date & Time Provider Department CSN    2/23/2017 11:00 AM Rukhsana Zuluaga MD [facility] -  [id number]      Care Providers     Provider Role Specialty Primary office phone    Rukhsana Zuluaga MD Attending Provider Gastroenterology [phone]      Your Vitals Were     BP Pulse Temp Resp Height Weight    107/72 102 98 °F (36.7 °C) (Oral) 16 5' 7" (1.702 m) 83.9 kg (185 lb)    SpO2 BMI             99% 28.98 kg/m2         Recent Lab Values        12/3/2015 2/26/2016 3/16/2016 7/6/2016 9/18/2016 10/4/2016 12/15/2016 1/23/2017      9:40 AM 10:52 AM  " 8:55 AM  9:15 AM  4:30 PM  8:48 PM  4:30 AM  9:05 AM    A1C 7.9 (H) 8.2 (H) 8.2 (H) 7.4 (H) 7.0 (H) 7.0 (H) 7.4 (H) 7.1 (H)    Comment for A1C at  9:15 AM on 7/6/2016:  According to ADA guidelines, hemoglobin A1C <7.0% represents  optimal control in non-pregnant diabetic patients.  Different  metrics may apply to specific populations.   Standards of Medical Care in Diabetes - 2016.  For the purpose of screening for the presence of diabetes:  <5.7%     Consistent with the absence of diabetes  5.7-6.4%  Consistent with increasing risk for diabetes   (prediabetes)  >or=6.5%  Consistent with diabetes  Currently no consensus exists for use of hemoglobin A1C  for diagnosis of diabetes for children.      Comment for A1C at  4:30 PM on 9/18/2016:  According to ADA guidelines, hemoglobin A1C <7.0% represents  optimal control in non-pregnant diabetic patients.  Different  metrics may apply to specific populations.   Standards of Medical Care in Diabetes - 2016.  For the purpose of screening for the presence of diabetes:  <5.7%     Consistent with the absence of diabetes  5.7-6.4%  Consistent with increasing risk for diabetes   (prediabetes)  >or=6.5%  Consistent with diabetes  Currently no consensus exists for use of hemoglobin A1C  for diagnosis of diabetes for children.      Comment for A1C at  8:48 PM on 10/4/2016:  According to ADA guidelines, hemoglobin A1C <7.0% represents  optimal control in non-pregnant diabetic patients.  Different  metrics may apply to specific populations.   Standards of Medical Care in Diabetes - 2016.  For the purpose of screening for the presence of diabetes:  <5.7%     Consistent with the absence of diabetes  5.7-6.4%  Consistent with increasing risk for diabetes   (prediabetes)  >or=6.5%  Consistent with diabetes  Currently no consensus exists for use of hemoglobin A1C  for diagnosis of diabetes for children.      Comment for A1C at  4:30 AM on 12/15/2016:  According to ADA guidelines,  hemoglobin A1C <7.0% represents  optimal control in non-pregnant diabetic patients.  Different  metrics may apply to specific populations.   Standards of Medical Care in Diabetes - 2016.  For the purpose of screening for the presence of diabetes:  <5.7%     Consistent with the absence of diabetes  5.7-6.4%  Consistent with increasing risk for diabetes   (prediabetes)  >or=6.5%  Consistent with diabetes  Currently no consensus exists for use of hemoglobin A1C  for diagnosis of diabetes for children.      Comment for A1C at  9:05 AM on 1/23/2017:  According to ADA guidelines, hemoglobin A1C <7.0% represents  optimal control in non-pregnant diabetic patients.  Different  metrics may apply to specific populations.   Standards of Medical Care in Diabetes - 2016.  For the purpose of screening for the presence of diabetes:  <5.7%     Consistent with the absence of diabetes  5.7-6.4%  Consistent with increasing risk for diabetes   (prediabetes)  >or=6.5%  Consistent with diabetes  Currently no consensus exists for use of hemoglobin A1C  for diagnosis of diabetes for children.        Pending Labs     Order Current Status    Albumin, Peritoneal, Pleural Fluid or DERIAN Drainage, In-House Ascites In process    Protein, Peritoneal, Pleural Fluid or DERIAN Drainage, In-House Ascites In process    WBC & Diff,Body Fluid Ascites In process      Allergies as of 2/23/2017        Reactions    No Known Drug Allergies       Advance Directives     An advance directive is a document which, in the event you are no longer able to make decisions for yourself, tells your healthcare team what kind of treatment you do or do not want to receive, or who you would like to make those decisions for you.  If you do not currently have an advance directive, [facility] encourages you to create one.  For more information call:  [phone]), [phone] ([phone]),  or log on to [email or web address].        Language Assistance Services      ATTENTION: Language assistance services are available, free of charge. Please call [phone].      ATENCIÓN: Si habla trino, tiene a hernandez disposición servicios gratuitos de asistencia lingüística. Llame al [phone].     CHÚ Ý: N?u b?n nói Ti?ng Vi?t, có các d?ch v? h? tr? ngôn ng? mi?n phí dành cho b?n. G?i s? [phone].        Chronic Kindey Disease Education             Diabetes Discharge Instructions                                    [facility] -  complies with applicable Federal civil rights laws and does not discriminate on the basis of race, color, national origin, age, disability, or sex.

## 2017-02-23 NOTE — TELEPHONE ENCOUNTER
----- Message from Maribell Nunes MA sent at 2/23/2017  2:13 PM CST -----  Per Dr. Zuluaga's lab note needs Hepatitis A/B. Thanks, Maribell

## 2017-02-23 NOTE — TELEPHONE ENCOUNTER
Called pt to let him know that a provider will be calling him back, I left a voice message for pt and asked if he any other concerns to please call me. Will respond to his message in encounters also and will let him know that Tim will be calling him.

## 2017-02-23 NOTE — DISCHARGE INSTRUCTIONS

## 2017-02-23 NOTE — PROGRESS NOTES
Paracentesis complete. Patient tolerated well. Denies discomfort. 3 liters chylous fluid removed. Specimen sent to lab for analysis. Band-aid in place to healthy site. No signs of bleeding noted.

## 2017-02-23 NOTE — PLAN OF CARE
Problem: Patient Care Overview  Goal: Plan of Care Review  Outcome: Outcome(s) achieved Date Met:  02/23/17  Patient discharged home in stable condition via wheelchair with ride. Verbalized understanding of discharge instructions. Patient voiced no complaints at this time. Patient stood at side of bed, walked steps with no new motor or sensory deficits. Neurologically intact.

## 2017-02-28 ENCOUNTER — CLINICAL SUPPORT (OUTPATIENT)
Dept: GASTROENTEROLOGY | Facility: CLINIC | Age: 42
End: 2017-02-28
Payer: MEDICARE

## 2017-02-28 VITALS
SYSTOLIC BLOOD PRESSURE: 98 MMHG | WEIGHT: 190.5 LBS | HEART RATE: 62 BPM | HEIGHT: 68 IN | BODY MASS INDEX: 28.87 KG/M2 | DIASTOLIC BLOOD PRESSURE: 58 MMHG

## 2017-02-28 DIAGNOSIS — Z23 NEED FOR HEPATITIS A AND B VACCINATION: ICD-10-CM

## 2017-02-28 PROCEDURE — 90471 IMMUNIZATION ADMIN: CPT | Mod: S$GLB,,, | Performed by: INTERNAL MEDICINE

## 2017-02-28 PROCEDURE — 99499 UNLISTED E&M SERVICE: CPT | Mod: S$GLB,,, | Performed by: INTERNAL MEDICINE

## 2017-02-28 PROCEDURE — 90636 HEP A/HEP B VACC ADULT IM: CPT | Mod: S$GLB,,, | Performed by: INTERNAL MEDICINE

## 2017-02-28 PROCEDURE — 99999 PR PBB SHADOW E&M-EST. PATIENT-LVL III: CPT | Mod: PBBFAC,,,

## 2017-02-28 NOTE — MR AVS SNAPSHOT
[facility]  Gastroenterology  [address]  [facility] [address]  Phone: [phone]  Fax: [phone]                  Lv Crocker   2017 9:00 AM   Clinical Support    Description:  Male : 1975   Provider:  BALDO HAMLIN   Department:  [facility] - Gastroenterology           Diagnoses this Visit        Comments    Need for hepatitis A and B vaccination                To Do List           Future Appointments        Provider Department Dept Phone    3/13/2017 9:00 AM Monica Fernandez, DNP, NP [facility] - Endocrinology [phone]    3/20/2017 10:00 AM LABORATORY, [facility]     3/20/2017 10:10 AM SPECIMEN LAB, [facility]     3/24/2017 10:30 AM MD [facility]al - Nephrology [phone]    3/29/2017 2:30 PM Rukhsana Zuluaga MD [facility] Gastroenterology [phone]      Your Future Surgeries/Procedures     Mar 13, 2017   Surgery with Rukhsana Zuluaga MD   [facility])    [address]  [facility] [address] [phone]              Goals (5 Years of Data)     None      [facility]     [facility] -  Assistance  Registered nurses in the [facility] provide clinical advisement, health education, appointment booking, and other advisory services.  Call for this free service at [phone].             Medications           Message regarding Medications     Verify the changes and/or additions to your medication regime listed below are the same as discussed with your clinician today.  If any of these changes or additions are incorrect, please notify your healthcare provider.             Verify that the below list of medications is an accurate representation of the medications you are currently taking.  If none reported, the list may be blank. If incorrect, please contact your healthcare provider. Carry this list with you in case of emergency.  "          Current Medications     alendronate (FOSAMAX) 70 MG tablet     aspirin (ECOTRIN) 81 MG EC tablet Take 1 tablet (81 mg total) by mouth once daily.    calcium-vitamin D3 500 mg(1,250mg) -200 unit per tablet Take 1 tablet by mouth once daily.    doxepin (ZONALON) 5 % cream Apply topically 2 (two) times daily as needed for Itching. Use on intact skin    escitalopram oxalate (LEXAPRO) 20 MG tablet Take 1 tablet (20 mg total) by mouth once daily.    gabapentin (NEURONTIN) 100 MG capsule Take 3 capsules (300 mg total) by mouth 3 (three) times daily.    insulin detemir (LEVEMIR FLEXTOUCH) 100 unit/mL (3 mL) SubQ InPn pen Inject 20 Units into the skin 2 (two) times daily.    insulin lispro (HUMALOG) 100 unit/mL injection Inject 10 Units into the skin 3 (three) times daily before meals.    insulin needles, disposable, 32 x 5/32 " Ndle 1 Device by Misc.(Non-Drug; Combo Route) route 5 (five) times daily.    lancets Misc 1 Device by Misc.(Non-Drug; Combo Route) route 4 (four) times daily with meals and nightly.    levothyroxine (SYNTHROID) 150 MCG tablet TAKE 1 TABLET BY MOUTH EVERY MORNING BEFORE BREAKFAST    magnesium oxide (MAG-OX) 400 mg tablet Take 1 tablet (400 mg total) by mouth once daily.    mupirocin (BACTROBAN) 2 % ointment AAA bid to sores for healing    mycophenolate (MYFORTIC) 180 MG TbEC Take 4 tablets (720 mg total) by mouth 2 (two) times daily. S/P Heart Transplant 11/18/2014 ICD-10 Z94.1    nortriptyline (PAMELOR) 25 MG capsule Take 1 capsule (25 mg total) by mouth every evening.    pantoprazole (PROTONIX) 40 MG tablet TAKE ONE TABLET BY MOUTH EVERY DAY    pravastatin (PRAVACHOL) 40 MG tablet Take 1 tablet (40 mg total) by mouth every evening.    predniSONE (DELTASONE) 2.5 MG tablet Take 1 tablet (2.5 mg total) by mouth once daily. S/P Heart Transplant 11/18/2014 ICD-10 Z94.1    tacrolimus (PROGRAF) 1 MG Cap Taked 2mg orally in the morning and 3mg orally in the evening.   S/p heart transplant  " 11/18/2014  ICD-10 Z94.1    tamsulosin (FLOMAX) 0.4 mg Cp24 Take 2 capsules (0.8 mg total) by mouth every evening.    torsemide (DEMADEX) 20 MG Tab Take 2 tablets (40 mg total) by mouth once daily.           Clinical Reference Information           Your Vitals Were     BP                   98/58           Blood Pressure          Most Recent Value    BP  (!)  98/58      Allergies as of 2/28/2017     No Known Drug Allergies      Immunizations Administered on Date of Encounter - 2/28/2017     Name Date Dose VIS Date Route    Hepatitis A / Hepatitis B 2/28/2017 1 mL 7/20/2016 Intramuscular      Orders Placed During Today's Visit      Normal Orders This Visit    Hepatitis A / Hepatitis B Combined Vaccine (IM)       Maintenance Dialysis History     Patient has no recorded history of maintenance dialysis.      Transplant Information        Txp Date Organ Coordinator Care Team    11/18/2014 Heart Lisa Wilkinson Referring Physician:  Placido Tobias MD   Corresponding Physician:  Placido Tobias MD   Surgeon:  Prem Nath MD         Language Assistance Services     ATTENTION: Language assistance services are available, free of charge. Please call [phone].      ATENCIÓN: Si habla español, tiene a hernandez disposición servicios gratuitos de asistencia lingüística. Llame al [phone].     CHÚ Ý: N?u b?n nói Ti?ng Vi?t, có các d?ch v? h? tr? ngôn ng? mi?n phí dành cho b?n. G?i s? [phone].         [facility] - Gastroenterology complies with applicable Federal civil rights laws and does not discriminate on the basis of race, color, national origin, age, disability, or sex.

## 2017-02-28 NOTE — PROGRESS NOTES
Received 1 mL Hep A & B vaccine in left deltoid. Tolerated well. No redness, bruising, or swelling. Reports pain 1/10. Denies headache, dizziness, or nausea. Band aid applied. Monitored for 15 min. No reaction noted.

## 2017-03-01 ENCOUNTER — PATIENT MESSAGE (OUTPATIENT)
Dept: TRANSPLANT | Facility: CLINIC | Age: 42
End: 2017-03-01

## 2017-03-01 ENCOUNTER — TELEPHONE (OUTPATIENT)
Dept: HEMATOLOGY/ONCOLOGY | Facility: CLINIC | Age: 42
End: 2017-03-01

## 2017-03-01 ENCOUNTER — TELEPHONE (OUTPATIENT)
Dept: GASTROENTEROLOGY | Facility: CLINIC | Age: 42
End: 2017-03-01

## 2017-03-01 RX ORDER — NORTRIPTYLINE HYDROCHLORIDE 25 MG/1
25 CAPSULE ORAL NIGHTLY
Qty: 30 CAPSULE | Refills: 3 | Status: SHIPPED | OUTPATIENT
Start: 2017-03-01 | End: 2017-04-26 | Stop reason: SDUPTHER

## 2017-03-01 NOTE — TELEPHONE ENCOUNTER
----- Message from Adolfo Robles MD sent at 2/28/2017  5:17 PM CST -----  Please let the patient know that recent lab results show that we need to restart weekly procrit injections. Please schedule him in chemo for weekly injections x 3 weeks.  Please schedule follow up for patient to see me at [facility] clinic on the same day he sees Dr. Zambrano on 3/24/17. Possible procrit at follow up. No need of any labwork for me on that day. Thank you.

## 2017-03-01 NOTE — TELEPHONE ENCOUNTER
Spoke with patient. He states he is getting to miserable stage and will need a paracentesis soon. Will inform Dr. Zuluaga, he verbalized understanding.

## 2017-03-01 NOTE — TELEPHONE ENCOUNTER
----- Message from Adolfo Robles MD sent at 2/28/2017  5:17 PM CST -----  Please let the patient know that recent lab results show that we need to restart weekly procrit injections. Please schedule him in chemo for weekly injections x 3 weeks.  Please schedule follow up for patient to see me at [facility] clinic on the same day he sees Dr. Zambrano on 3/24/17. Possible procrit at follow up. No need of any labwork for me on that day. Thank you.

## 2017-03-03 ENCOUNTER — TELEPHONE (OUTPATIENT)
Dept: GASTROENTEROLOGY | Facility: CLINIC | Age: 42
End: 2017-03-03

## 2017-03-03 DIAGNOSIS — R18.8 OTHER ASCITES: Primary | ICD-10-CM

## 2017-03-03 NOTE — TELEPHONE ENCOUNTER
----- Message from Orion Merchant sent at 3/3/2017  1:37 PM CST -----  The patient is requesting to speak with the staff of Dr Zuluaga and can be reached at [phone].

## 2017-03-06 NOTE — TELEPHONE ENCOUNTER
Spoke with patient. Informed him of appointment for 1 pm on 03/06/2017, he verbalized understanding.

## 2017-03-07 ENCOUNTER — HOSPITAL ENCOUNTER (OUTPATIENT)
Dept: RADIOLOGY | Facility: HOSPITAL | Age: 42
Discharge: HOME | End: 2017-03-07
Attending: INTERNAL MEDICINE
Payer: MEDICARE

## 2017-03-07 VITALS
HEIGHT: 67 IN | SYSTOLIC BLOOD PRESSURE: 130 MMHG | BODY MASS INDEX: 29.35 KG/M2 | WEIGHT: 187 LBS | OXYGEN SATURATION: 99 % | RESPIRATION RATE: 14 BRPM | HEART RATE: 98 BPM | DIASTOLIC BLOOD PRESSURE: 72 MMHG

## 2017-03-07 DIAGNOSIS — R18.8 OTHER ASCITES: ICD-10-CM

## 2017-03-07 PROCEDURE — C1729 CATH, DRAINAGE: HCPCS

## 2017-03-07 PROCEDURE — 84478 ASSAY OF TRIGLYCERIDES: CPT

## 2017-03-07 PROCEDURE — 63600175 PHARM REV CODE 636 W HCPCS: Performed by: INTERNAL MEDICINE

## 2017-03-07 PROCEDURE — A7048 VACUUM DRAIN BOTTLE/TUBE KIT: HCPCS

## 2017-03-07 PROCEDURE — P9047 ALBUMIN (HUMAN), 25%, 50ML: HCPCS | Performed by: INTERNAL MEDICINE

## 2017-03-07 RX ORDER — ALBUMIN HUMAN 250 G/1000ML
35 SOLUTION INTRAVENOUS ONCE
Status: COMPLETED | OUTPATIENT
Start: 2017-03-07 | End: 2017-03-07

## 2017-03-07 RX ADMIN — ALBUMIN (HUMAN) 35 G: 25 SOLUTION INTRAVENOUS at 12:03

## 2017-03-07 NOTE — H&P
Most recent H&P has been reviewed and updated by radiology, patient seen without significant changes.  Risks and benefits were explained to the patient prior to the procedure.  Written and informed consent was obtained.  Dr. Zuluaga 2-

## 2017-03-07 NOTE — PROGRESS NOTES
Procedure complete. 3750 ml of chylous fluid removed. Patient tolerated well. Denies complaints. VS stable. Bandaid placed to healthy puncture site, CDI, WNL. Albumin infusing per MD orders. Specimen sent to lab per MD orders.

## 2017-03-07 NOTE — DISCHARGE SUMMARY
Procedure was performed by me.  Sterile technique was performed in the LLQ, lidocaine was used as a local anesthetic.  3750 ccs of chylous fluid removed for therapeutic and diagnostic purposes.  Pt tolerated the procedure well without immediate complications.  Please see radiologist report for details. F/u with PCP and/or ordering physician.

## 2017-03-07 NOTE — DISCHARGE INSTRUCTIONS

## 2017-03-07 NOTE — IP AVS SNAPSHOT
[facility]  [address] [place] [address] [address] [place] [id number]           Patient Discharge Instructions     Our goal is to set you up for success. This packet includes information on your condition, medications, and your home care. It will help you to care for yourself so you don't get sicker and need to go back to the hospital.     Please ask your nurse if you have any questions.        There are many details to remember when preparing to leave the hospital. Here is what you will need to do:    1. Take your medicine. If you are prescribed medications, review your Medication List in the following pages. You may have new medications to  at the pharmacy and others that you'll need to stop taking. Review the instructions for how and when to take your medications. Talk with your doctor or nurses if you are unsure of what to do.     2. Go to your follow-up appointments. Specific follow-up information is listed in the following pages. Your may be contacted by a transition nurse or clinical provider about future appointments. Be sure we have all of the phone numbers to reach you, if needed. Please contact your provider's office if you are unable to make an appointment.     3. Watch for warning signs. Your doctor or nurse will give you detailed warning signs to watch for and when to call for assistance. These instructions may also include educational information about your condition. If you experience any of warning signs to your health, call your doctor.               [facility]  Unless otherwise directed by your provider, please contact [facility], our nurse care line that is available for 24/7 assistance.     [phone] (toll-free)    Registered nurses in the [facility] provide clinical advisement, health education, appointment booking, and other advisory services.                    ** Verify the list of medication(s) below is accurate and up to date. Carry this with you  in case of emergency. If your medications have changed, please notify your healthcare provider.             Medication List      TAKE these medications        Additional Info                      alendronate 70 MG tablet   Commonly known as:  FOSAMAX   Refills:  0      Begin Date    AM    Noon    PM    Bedtime       aspirin 81 MG EC tablet   Commonly known as:  ECOTRIN   Quantity:  30 tablet   Refills:  11   Dose:  81 mg    Instructions:  Take 1 tablet (81 mg total) by mouth once daily.     Begin Date    AM    Noon    PM    Bedtime       calcium-vitamin D3 500 mg(1,250mg) -200 unit per tablet   Quantity:  60 tablet   Refills:  11   Dose:  1 tablet    Instructions:  Take 1 tablet by mouth once daily.     Begin Date    AM    Noon    PM    Bedtime       doxepin 5 % cream   Commonly known as:  ZONALON   Quantity:  45 g   Refills:  3    Instructions:  Apply topically 2 (two) times daily as needed for Itching. Use on intact skin     Begin Date    AM    Noon    PM    Bedtime       escitalopram oxalate 20 MG tablet   Commonly known as:  LEXAPRO   Quantity:  30 tablet   Refills:  11   Dose:  20 mg    Instructions:  Take 1 tablet (20 mg total) by mouth once daily.     Begin Date    AM    Noon    PM    Bedtime       gabapentin 100 MG capsule   Commonly known as:  NEURONTIN   Quantity:  360 capsule   Refills:  11   Dose:  300 mg    Instructions:  Take 3 capsules (300 mg total) by mouth 3 (three) times daily.     Begin Date    AM    Noon    PM    Bedtime       insulin detemir 100 unit/mL (3 mL) Inpn pen   Commonly known as:  LEVEMIR FLEXTOUCH   Quantity:  15 mL   Refills:  12   Dose:  20 Units    Instructions:  Inject 20 Units into the skin 2 (two) times daily.     Begin Date    AM    Noon    PM    Bedtime       insulin lispro 100 unit/mL injection   Commonly known as:  HUMALOG   Quantity:  15 mL   Refills:  12   Dose:  10 Units    Instructions:  Inject 10 Units into the skin 3 (three) times daily before meals.     Begin Date     "AM    Noon    PM    Bedtime       lancets Misc   Quantity:  100 each   Refills:  5   Dose:  1 Device   Comments:  ON HOLD, patient will request fill once discharged from SNF    Instructions:  1 Device by Misc.(Non-Drug; Combo Route) route 4 (four) times daily with meals and nightly.     Begin Date    AM    Noon    PM    Bedtime       levothyroxine 150 MCG tablet   Commonly known as:  SYNTHROID   Quantity:  30 tablet   Refills:  0    Instructions:  TAKE 1 TABLET BY MOUTH EVERY MORNING BEFORE BREAKFAST     Begin Date    AM    Noon    PM    Bedtime       magnesium oxide 400 mg tablet   Commonly known as:  MAG-OX   Quantity:  30 tablet   Refills:  11   Dose:  400 mg    Instructions:  Take 1 tablet (400 mg total) by mouth once daily.     Begin Date    AM    Noon    PM    Bedtime       mupirocin 2 % ointment   Commonly known as:  BACTROBAN   Quantity:  30 g   Refills:  1    Instructions:  AAA bid to sores for healing     Begin Date    AM    Noon    PM    Bedtime       mycophenolate 180 MG Tbec   Commonly known as:  MYFORTIC   Quantity:  240 tablet   Refills:  11   Dose:  720 mg    Instructions:  Take 4 tablets (720 mg total) by mouth 2 (two) times daily. S/P Heart Transplant 11/18/2014 ICD-10 Z94.1     Begin Date    AM    Noon    PM    Bedtime       nortriptyline 25 MG capsule   Commonly known as:  PAMELOR   Quantity:  30 capsule   Refills:  3   Dose:  25 mg    Instructions:  Take 1 capsule (25 mg total) by mouth every evening.     Begin Date    AM    Noon    PM    Bedtime       pantoprazole 40 MG tablet   Commonly known as:  PROTONIX   Quantity:  30 tablet   Refills:  11    Instructions:  TAKE ONE TABLET BY MOUTH EVERY DAY     Begin Date    AM    Noon    PM    Bedtime       pen needle, diabetic 32 gauge x 5/32" Ndle   Quantity:  200 each   Refills:  12   Dose:  1 Device    Instructions:  1 Device by Misc.(Non-Drug; Combo Route) route 5 (five) times daily.     Begin Date    AM    Noon    PM    Bedtime       pravastatin 40 " MG tablet   Commonly known as:  PRAVACHOL   Quantity:  30 tablet   Refills:  11   Dose:  40 mg    Instructions:  Take 1 tablet (40 mg total) by mouth every evening.     Begin Date    AM    Noon    PM    Bedtime       predniSONE 2.5 MG tablet   Commonly known as:  DELTASONE   Quantity:  30 tablet   Refills:  11   Dose:  2.5 mg    Instructions:  Take 1 tablet (2.5 mg total) by mouth once daily. S/P Heart Transplant 11/18/2014 ICD-10 Z94.1     Begin Date    AM    Noon    PM    Bedtime       tacrolimus 1 MG Cap   Commonly known as:  PROGRAF   Quantity:  150 capsule   Refills:  11    Instructions:  Taked 2mg orally in the morning and 3mg orally in the evening.  S/p heart transplant  11/18/2014  ICD-10 Z94.1     Begin Date    AM    Noon    PM    Bedtime       tamsulosin 0.4 mg Cp24   Commonly known as:  FLOMAX   Quantity:  60 capsule   Refills:  11   Dose:  0.8 mg    Instructions:  Take 2 capsules (0.8 mg total) by mouth every evening.     Begin Date    AM    Noon    PM    Bedtime       torsemide 20 MG Tab   Commonly known as:  DEMADEX   Quantity:  60 tablet   Refills:  11   Dose:  40 mg    Instructions:  Take 2 tablets (40 mg total) by mouth once daily.     Begin Date    AM    Noon    PM    Bedtime                  Please bring to all follow up appointments:    1. A copy of your discharge instructions.  2. All medicines you are currently taking in their original bottles.  3. Identification and insurance card.    Please arrive 15 minutes ahead of scheduled appointment time.    Please call 24 hours in advance if you must reschedule your appointment and/or time.        Your Scheduled Appointments     Mar 10, 2017 10:00 AM CST   Infusion 15 Min with CHAIR 02 ONLH [facility]    [address] [facility] [address]   [phone]            Mar 13, 2017  9:00 AM CDT   Established Patient with Monica Fernandez, RA, NP   [facility] ([facility] )    [address]  [facility] [address]   [phone]            Mar 17, 2017 10:00 AM CDT   Infusion 15 Min with CHAIR 01 TERRELLH [facility]    [address] [facility] [address]   [phone]            Mar 20, 2017 10:00 AM CDT   Non-Fasting Lab with LAB[facility]ATORY, [facility]    [address]  [facility] [address]               Mar 20, 2017 10:10 AM CDT   Urine with SPECIMEN LAB, [facility] ([place])    [address]  [facility] [address]                 Your Future Surgeries/Procedures     Mar 13, 2017   Surgery with Rukhsana Zuluaga MD   [facility])    [address] [facility] [id number] [phone]                  Discharge Instructions         Discharge Instructions for Paracentesis  Paracentesis is a procedure to remove extra fluid from your belly (abdomen). This fluid buildup in the abdomen is called ascites. The procedure may have been done to take a sample of the fluid. Or, it may have been done to drain the extra fluid from your abdomen and help make you more comfortable.     Ascites is buildup of excess fluid in the abdomen.   Home care  · If you have pain after the procedure, your healthcare provider can prescribe or recommend pain medicines. Take these exactly as directed. If you stopped taking other medicines before the procedure, ask your provider when you can start them again.  · Take it easy for 24 hours after the procedure. Avoid physical activity until your provider says its OK.  · You will have a small bandage over the puncture site. Stitches (sutures), surgical staples, adhesive tapes, adhesive strips, or surgical glue may be used to close the incision. They also help stop bleeding and speed healing. You may take the bandage off in 24 hours.  · Check the puncture site for the signs of infection listed  "below.  Follow-up care  Make a follow-up appointment with your healthcare provider as directed. During your follow-up visit, your provider will check your healing. Let your provider know how you are feeling. You can also discuss the cause of your ascites and if you need any further treatment.  When to seek medical advice  Call your healthcare provider if you have any of the following after the procedure:  · A fever of 100.4°F (38.0°C) or higher  · Trouble breathing  · Pain that doesn't go away even after taking pain medicine  · Belly pain not caused by having the skin punctured  · Bleeding from the puncture site  · More than a small amount of fluid leaking from the puncture site  · Swollen belly  · Signs of infection at the puncture site. These include increased pain, redness, or swelling, warmth, or bad-smelling drainage.  · Blood in your urine  · Feeling dizzy or lightheaded, or fainting   Date Last Reviewed: 7/1/2016  © 8691-1656 [employer] [address] [place] [address]. All rights reserved. This information is not intended as a substitute for professional medical care. Always follow your healthcare professional's instructions.            Admission Information     Date & Time Provider Department CSN    3/7/2017 11:00 AM Rukhsana Zuluaga MD [facility] -  [id number]      Care Providers     Provider Role Specialty Primary office phone    Rukhsana Zuluaga MD Attending Provider Gastroenterology [phone]      Your Vitals Were     BP Pulse Resp Height Weight SpO2    124/76 (BP Location: Left arm, Patient Position: Lying, BP Method: Automatic) 99 14 5' 7" (1.702 m) 84.8 kg (187 lb) 99%    BMI                29.29 kg/m2          Recent Lab Values        12/3/2015 2/26/2016 3/16/2016 7/6/2016 9/18/2016 10/4/2016 12/15/2016 1/23/2017      9:40 AM 10:52 AM  8:55 AM  9:15 AM  4:30 PM  8:48 PM  4:30 AM  9:05 AM    A1C 7.9 (H) 8.2 (H) 8.2 (H) 7.4 (H) 7.0 (H) 7.0 (H) 7.4 (H) 7.1 (H)    Comment " for A1C at  9:15 AM on 7/6/2016:  According to ADA guidelines, hemoglobin A1C <7.0% represents  optimal control in non-pregnant diabetic patients.  Different  metrics may apply to specific populations.   Standards of Medical Care in Diabetes - 2016.  For the purpose of screening for the presence of diabetes:  <5.7%     Consistent with the absence of diabetes  5.7-6.4%  Consistent with increasing risk for diabetes   (prediabetes)  >or=6.5%  Consistent with diabetes  Currently no consensus exists for use of hemoglobin A1C  for diagnosis of diabetes for children.      Comment for A1C at  4:30 PM on 9/18/2016:  According to ADA guidelines, hemoglobin A1C <7.0% represents  optimal control in non-pregnant diabetic patients.  Different  metrics may apply to specific populations.   Standards of Medical Care in Diabetes - 2016.  For the purpose of screening for the presence of diabetes:  <5.7%     Consistent with the absence of diabetes  5.7-6.4%  Consistent with increasing risk for diabetes   (prediabetes)  >or=6.5%  Consistent with diabetes  Currently no consensus exists for use of hemoglobin A1C  for diagnosis of diabetes for children.      Comment for A1C at  8:48 PM on 10/4/2016:  According to ADA guidelines, hemoglobin A1C <7.0% represents  optimal control in non-pregnant diabetic patients.  Different  metrics may apply to specific populations.   Standards of Medical Care in Diabetes - 2016.  For the purpose of screening for the presence of diabetes:  <5.7%     Consistent with the absence of diabetes  5.7-6.4%  Consistent with increasing risk for diabetes   (prediabetes)  >or=6.5%  Consistent with diabetes  Currently no consensus exists for use of hemoglobin A1C  for diagnosis of diabetes for children.      Comment for A1C at  4:30 AM on 12/15/2016:  According to ADA guidelines, hemoglobin A1C <7.0% represents  optimal control in non-pregnant diabetic patients.  Different  metrics may apply to specific populations.    Standards of Medical Care in Diabetes - 2016.  For the purpose of screening for the presence of diabetes:  <5.7%     Consistent with the absence of diabetes  5.7-6.4%  Consistent with increasing risk for diabetes   (prediabetes)  >or=6.5%  Consistent with diabetes  Currently no consensus exists for use of hemoglobin A1C  for diagnosis of diabetes for children.      Comment for A1C at  9:05 AM on 1/23/2017:  According to ADA guidelines, hemoglobin A1C <7.0% represents  optimal control in non-pregnant diabetic patients.  Different  metrics may apply to specific populations.   Standards of Medical Care in Diabetes - 2016.  For the purpose of screening for the presence of diabetes:  <5.7%     Consistent with the absence of diabetes  5.7-6.4%  Consistent with increasing risk for diabetes   (prediabetes)  >or=6.5%  Consistent with diabetes  Currently no consensus exists for use of hemoglobin A1C  for diagnosis of diabetes for children.        Pending Labs     Order Current Status    Triglyceride, Body Fluid (Reference Lab) Ascites Preliminary result      Allergies as of 3/7/2017        Reactions    No Known Drug Allergies       Advance Directives     An advance directive is a document which, in the event you are no longer able to make decisions for yourself, tells your healthcare team what kind of treatment you do or do not want to receive, or who you would like to make those decisions for you.  If you do not currently have an advance directive, [facility] encourages you to create one.  For more information call:  [phone] ([phone]), [phone] ([phone]),  or log on to [email or web address].        Language Assistance Services     ATTENTION: Language assistance services are available, free of charge. Please call [phone].      ATENCIÓN: Si habla español, tiene a hernandez disposición servicios gratuitos de asistencia lingüística. Llame al [phone].     CHÚ Ý: N?u b?n nói Ti?ng Vi?t, có các d?ch v?  h? tr? ngôn ng? mi?n phí jonn cho b?n. G?i s? [phone].        Chronic Kindey Disease Education             Diabetes Discharge Instructions                                    [facility] complies with applicable Federal civil rights laws and does not discriminate on the basis of race, color, national origin, age, disability, or sex.

## 2017-03-07 NOTE — PLAN OF CARE
Problem: Patient Care Overview  Goal: Plan of Care Review  Outcome: Outcome(s) achieved Date Met:  03/07/17  Patient d/c home in stable condition via wheelchair with ride. Verbalized understanding of d/c instructions. Patient voiced no complaints at this time. Patient stood at side of bed, walked steps with no new motor deficits. Neurologically intact.

## 2017-03-10 ENCOUNTER — INFUSION (OUTPATIENT)
Dept: INFUSION THERAPY | Facility: HOSPITAL | Age: 42
End: 2017-03-10
Attending: INTERNAL MEDICINE
Payer: MEDICARE

## 2017-03-10 VITALS
DIASTOLIC BLOOD PRESSURE: 63 MMHG | RESPIRATION RATE: 16 BRPM | HEIGHT: 67 IN | HEART RATE: 98 BPM | WEIGHT: 186.94 LBS | TEMPERATURE: 98 F | BODY MASS INDEX: 29.34 KG/M2 | SYSTOLIC BLOOD PRESSURE: 94 MMHG

## 2017-03-10 DIAGNOSIS — N18.30 ANEMIA OF CHRONIC RENAL FAILURE, STAGE 3 (MODERATE): ICD-10-CM

## 2017-03-10 DIAGNOSIS — N18.30 TYPE 1 DIABETES MELLITUS WITH STAGE 3 CHRONIC KIDNEY DISEASE: Primary | ICD-10-CM

## 2017-03-10 DIAGNOSIS — E10.22 TYPE 1 DIABETES MELLITUS WITH STAGE 3 CHRONIC KIDNEY DISEASE: Primary | ICD-10-CM

## 2017-03-10 DIAGNOSIS — D63.1 ANEMIA OF CHRONIC RENAL FAILURE, STAGE 3 (MODERATE): ICD-10-CM

## 2017-03-10 LAB
SPECIMEN SOURCE: 410 MG/DL
SPECIMEN SOURCE: NORMAL

## 2017-03-10 PROCEDURE — 63600175 PHARM REV CODE 636 W HCPCS: Performed by: INTERNAL MEDICINE

## 2017-03-10 PROCEDURE — 96372 THER/PROPH/DIAG INJ SC/IM: CPT

## 2017-03-10 RX ADMIN — ERYTHROPOIETIN 20000 UNITS: 20000 INJECTION, SOLUTION INTRAVENOUS; SUBCUTANEOUS at 11:03
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
## 2017-03-10 NOTE — PATIENT INSTRUCTIONS
[facility]  [address]  [address] [address]  [phone] phone     [phone] fax  Hours of Operation: Monday- Friday 8:00am- 5:00pm  After hours phone  [phone]  Hematology / Oncology Physicians on call      Dr. Zane Thomson    Please call with any concerns regarding your appointment today.

## 2017-03-13 ENCOUNTER — ANESTHESIA (OUTPATIENT)
Dept: ENDOSCOPY | Facility: HOSPITAL | Age: 42
End: 2017-03-13
Payer: MEDICARE

## 2017-03-13 ENCOUNTER — SURGERY (OUTPATIENT)
Age: 42
End: 2017-03-13

## 2017-03-13 ENCOUNTER — HOSPITAL ENCOUNTER (OUTPATIENT)
Facility: HOSPITAL | Age: 42
Discharge: HOME | End: 2017-03-13
Attending: INTERNAL MEDICINE | Admitting: INTERNAL MEDICINE
Payer: MEDICARE

## 2017-03-13 ENCOUNTER — ANESTHESIA EVENT (OUTPATIENT)
Dept: ENDOSCOPY | Facility: HOSPITAL | Age: 42
End: 2017-03-13
Payer: MEDICARE

## 2017-03-13 VITALS — RESPIRATION RATE: 32 BRPM

## 2017-03-13 VITALS
HEART RATE: 88 BPM | SYSTOLIC BLOOD PRESSURE: 84 MMHG | RESPIRATION RATE: 18 BRPM | DIASTOLIC BLOOD PRESSURE: 44 MMHG | TEMPERATURE: 98 F | OXYGEN SATURATION: 96 %

## 2017-03-13 DIAGNOSIS — K74.60 CIRRHOSIS OF LIVER WITH ASCITES, UNSPECIFIED HEPATIC CIRRHOSIS TYPE: ICD-10-CM

## 2017-03-13 DIAGNOSIS — K74.60 CIRRHOSIS: ICD-10-CM

## 2017-03-13 DIAGNOSIS — N18.30 ANEMIA OF CHRONIC RENAL FAILURE, STAGE 3 (MODERATE): ICD-10-CM

## 2017-03-13 DIAGNOSIS — D50.9 IRON DEFICIENCY ANEMIA, UNSPECIFIED: Primary | ICD-10-CM

## 2017-03-13 DIAGNOSIS — D63.1 ANEMIA OF CHRONIC RENAL FAILURE, STAGE 3 (MODERATE): ICD-10-CM

## 2017-03-13 DIAGNOSIS — R18.8 CIRRHOSIS OF LIVER WITH ASCITES, UNSPECIFIED HEPATIC CIRRHOSIS TYPE: ICD-10-CM

## 2017-03-13 LAB
GLUCOSE SERPL-MCNC: 185 MG/DL (ref 70–110)
POCT GLUCOSE: 185 MG/DL (ref 70–110)

## 2017-03-13 PROCEDURE — 63600175 PHARM REV CODE 636 W HCPCS: Performed by: NURSE ANESTHETIST, CERTIFIED REGISTERED

## 2017-03-13 PROCEDURE — 82962 GLUCOSE BLOOD TEST: CPT | Performed by: INTERNAL MEDICINE

## 2017-03-13 PROCEDURE — 37000008 HC ANESTHESIA 1ST 15 MINUTES: Performed by: INTERNAL MEDICINE

## 2017-03-13 PROCEDURE — 25000003 PHARM REV CODE 250: Performed by: INTERNAL MEDICINE

## 2017-03-13 PROCEDURE — 27201012 HC FORCEPS, HOT/COLD, DISP: Performed by: INTERNAL MEDICINE

## 2017-03-13 PROCEDURE — 43239 EGD BIOPSY SINGLE/MULTIPLE: CPT | Performed by: INTERNAL MEDICINE

## 2017-03-13 PROCEDURE — 88305 TISSUE EXAM BY PATHOLOGIST: CPT | Performed by: PATHOLOGY

## 2017-03-13 PROCEDURE — 25000003 PHARM REV CODE 250: Performed by: NURSE ANESTHETIST, CERTIFIED REGISTERED

## 2017-03-13 PROCEDURE — 37000009 HC ANESTHESIA EA ADD 15 MINS: Performed by: INTERNAL MEDICINE

## 2017-03-13 PROCEDURE — 43239 EGD BIOPSY SINGLE/MULTIPLE: CPT | Mod: ,,, | Performed by: INTERNAL MEDICINE

## 2017-03-13 PROCEDURE — 88305 TISSUE EXAM BY PATHOLOGIST: CPT | Mod: 26,,, | Performed by: PATHOLOGY

## 2017-03-13 RX ORDER — LIDOCAINE HYDROCHLORIDE 10 MG/ML
INJECTION INFILTRATION; PERINEURAL
Status: DISCONTINUED | OUTPATIENT
Start: 2017-03-13 | End: 2017-03-13

## 2017-03-13 RX ORDER — PROPOFOL 10 MG/ML
VIAL (ML) INTRAVENOUS
Status: DISCONTINUED | OUTPATIENT
Start: 2017-03-13 | End: 2017-03-13

## 2017-03-13 RX ORDER — SODIUM CHLORIDE, SODIUM LACTATE, POTASSIUM CHLORIDE, CALCIUM CHLORIDE 600; 310; 30; 20 MG/100ML; MG/100ML; MG/100ML; MG/100ML
INJECTION, SOLUTION INTRAVENOUS CONTINUOUS
Status: DISCONTINUED | OUTPATIENT
Start: 2017-03-13 | End: 2017-03-13 | Stop reason: HOSPADM

## 2017-03-13 RX ADMIN — PROPOFOL 50 MG: 10 INJECTION, EMULSION INTRAVENOUS at 10:03

## 2017-03-13 RX ADMIN — LIDOCAINE HYDROCHLORIDE 50 MG: 10 INJECTION, SOLUTION INFILTRATION; PERINEURAL at 10:03

## 2017-03-13 RX ADMIN — SODIUM CHLORIDE, SODIUM LACTATE, POTASSIUM CHLORIDE, AND CALCIUM CHLORIDE: .6; .31; .03; .02 INJECTION, SOLUTION INTRAVENOUS at 10:03

## 2017-03-13 NOTE — ANESTHESIA PREPROCEDURE EVALUATION
03/13/2017  Lv Crocker is a 41 y.o., male.    [facility] Anesthesia Evaluation    I have reviewed the Patient Summary Reports.    I have reviewed the Nursing Notes.   I have reviewed the Medications.     Review of Systems  Anesthesia Hx:  No problems with previous Anesthesia    Social:  Non-Smoker    Cardiovascular:   Hypertension Past MI CAD   CHF ECG has been reviewed. CONCLUSIONS     1 - Post-cardiac transplantation study.     2 - Normal left ventricular systolic function (EF 55-60%).     3 - Low normal right ventricular systolic function .     4 - Trivial to mild tricuspid regurgitation.     5 - Trivial to mild pulmonic regurgitation.     6 - Trivial pericardial effusion.     7 - The estimated PA systolic pressure is 37 mmHg.  Coronary Artery Disease: Hx of Myocardial Infarction  Congestive Heart Failure (CHF)  Hx of Heart Transplant, stable function  Hypertension, Essential Hypertension    Renal/:   Chronic Renal Disease  Kidney Function/Disease, Chronic Kidney Disease (CKD) , CKD Stage III (GFR 30-59)    Hepatic/GI:   GERD Liver Disease,  Liver Disease    Neurological:   Denies Neuromuscular Disease.  Denies Neuromuscular Disease   Endocrine:   Diabetes Hypothyroidism  Diabetes , controlled by insulin.    Psych:   Psychiatric History          Physical Exam  General:  Well nourished    Airway/Jaw/Neck:  Airway Findings: Mouth Opening: Normal Tongue: Normal  General Airway Assessment: Adult       Chest/Lungs:  Chest/Lungs Findings: Normal Respiratory Rate     Heart/Vascular:  Heart Findings: Rate: Normal             Anesthesia Plan  Type of Anesthesia, risks & benefits discussed:  Anesthesia Type:  MAC  Patient's Preference:   Intra-op Monitoring Plan:   Intra-op Monitoring Plan Comments:   Post Op Pain Control Plan:   Post Op Pain Control Plan Comments:   Induction:   IV  Beta Blocker:  Patient is  not currently on a Beta-Blocker (No further documentation required).       Informed Consent: Patient understands risks and agrees with Anesthesia plan.  Questions answered. Anesthesia consent signed with patient.  ASA Score: 3     Day of Surgery Review of History & Physical: I have interviewed and examined the patient. I have reviewed the patient's H&P dated:  There are no significant changes.          Ready For Surgery From Anesthesia Perspective.

## 2017-03-13 NOTE — IP AVS SNAPSHOT
[address] [place] [address]           Patient Discharge Instructions     Our goal is to set you up for success. This packet includes information on your condition, medications, and your home care. It will help you to care for yourself so you don't get sicker and need to go back to the hospital.     Please ask your nurse if you have any questions.        There are many details to remember when preparing to leave the hospital. Here is what you will need to do:    1. Take your medicine. If you are prescribed medications, review your Medication List in the following pages. You may have new medications to  at the pharmacy and others that you'll need to stop taking. Review the instructions for how and when to take your medications. Talk with your doctor or nurses if you are unsure of what to do.     2. Go to your follow-up appointments. Specific follow-up information is listed in the following pages. Your may be contacted by a transition nurse or clinical provider about future appointments. Be sure we have all of the phone numbers to reach you, if needed. Please contact your provider's office if you are unable to make an appointment.     3. Watch for warning signs. Your doctor or nurse will give you detailed warning signs to watch for and when to call for assistance. These instructions may also include educational information about your condition. If you experience any of warning signs to your health, call your doctor.               ** Verify the list of medication(s) below is accurate and up to date. Carry this with you in case of emergency. If your medications have changed, please notify your healthcare provider.             Medication List      CONTINUE taking these medications        Additional Info                      alendronate 70 MG tablet   Commonly known as:  FOSAMAX   Refills:  0      Begin Date    AM    Noon    PM    Bedtime       aspirin 81 MG EC  tablet   Commonly known as:  ECOTRIN   Quantity:  30 tablet   Refills:  11   Dose:  81 mg    Instructions:  Take 1 tablet (81 mg total) by mouth once daily.     Begin Date    AM    Noon    PM    Bedtime       escitalopram oxalate 20 MG tablet   Commonly known as:  LEXAPRO   Quantity:  30 tablet   Refills:  11   Dose:  20 mg    Instructions:  Take 1 tablet (20 mg total) by mouth once daily.     Begin Date    AM    Noon    PM    Bedtime       gabapentin 100 MG capsule   Commonly known as:  NEURONTIN   Quantity:  360 capsule   Refills:  11   Dose:  300 mg    Instructions:  Take 3 capsules (300 mg total) by mouth 3 (three) times daily.     Begin Date    AM    Noon    PM    Bedtime       insulin detemir 100 unit/mL (3 mL) Inpn pen   Commonly known as:  LEVEMIR FLEXTOUCH   Quantity:  15 mL   Refills:  12   Dose:  20 Units    Instructions:  Inject 20 Units into the skin 2 (two) times daily.     Begin Date    AM    Noon    PM    Bedtime       insulin lispro 100 unit/mL injection   Commonly known as:  HUMALOG   Quantity:  15 mL   Refills:  12   Dose:  10 Units    Instructions:  Inject 10 Units into the skin 3 (three) times daily before meals.     Begin Date    AM    Noon    PM    Bedtime       lancets Misc   Quantity:  100 each   Refills:  5   Dose:  1 Device   Comments:  ON HOLD, patient will request fill once discharged from SNF    Instructions:  1 Device by Misc.(Non-Drug; Combo Route) route 4 (four) times daily with meals and nightly.     Begin Date    AM    Noon    PM    Bedtime       levothyroxine 150 MCG tablet   Commonly known as:  SYNTHROID   Quantity:  30 tablet   Refills:  0    Instructions:  TAKE 1 TABLET BY MOUTH EVERY MORNING BEFORE BREAKFAST     Begin Date    AM    Noon    PM    Bedtime       magnesium oxide 400 mg tablet   Commonly known as:  MAG-OX   Quantity:  30 tablet   Refills:  11   Dose:  400 mg    Instructions:  Take 1 tablet (400 mg total) by mouth once daily.     Begin Date    AM    Noon    PM     "Bedtime       mycophenolate 180 MG Tbec   Commonly known as:  MYFORTIC   Quantity:  240 tablet   Refills:  11   Dose:  720 mg    Instructions:  Take 4 tablets (720 mg total) by mouth 2 (two) times daily. S/P Heart Transplant 11/18/2014 ICD-10 Z94.1     Begin Date    AM    Noon    PM    Bedtime       nortriptyline 25 MG capsule   Commonly known as:  PAMELOR   Quantity:  30 capsule   Refills:  3   Dose:  25 mg    Instructions:  Take 1 capsule (25 mg total) by mouth every evening.     Begin Date    AM    Noon    PM    Bedtime       pantoprazole 40 MG tablet   Commonly known as:  PROTONIX   Quantity:  30 tablet   Refills:  11    Instructions:  TAKE ONE TABLET BY MOUTH EVERY DAY     Begin Date    AM    Noon    PM    Bedtime       pen needle, diabetic 32 gauge x 5/32" Ndle   Quantity:  200 each   Refills:  12   Dose:  1 Device    Instructions:  1 Device by Misc.(Non-Drug; Combo Route) route 5 (five) times daily.     Begin Date    AM    Noon    PM    Bedtime       pravastatin 40 MG tablet   Commonly known as:  PRAVACHOL   Quantity:  30 tablet   Refills:  11   Dose:  40 mg    Instructions:  Take 1 tablet (40 mg total) by mouth every evening.     Begin Date    AM    Noon    PM    Bedtime       predniSONE 2.5 MG tablet   Commonly known as:  DELTASONE   Quantity:  30 tablet   Refills:  11   Dose:  2.5 mg    Instructions:  Take 1 tablet (2.5 mg total) by mouth once daily. S/P Heart Transplant 11/18/2014 ICD-10 Z94.1     Begin Date    AM    Noon    PM    Bedtime       tacrolimus 1 MG Cap   Commonly known as:  PROGRAF   Quantity:  150 capsule   Refills:  11    Instructions:  Taked 2mg orally in the morning and 3mg orally in the evening.  S/p heart transplant  11/18/2014  ICD-10 Z94.1     Begin Date    AM    Noon    PM    Bedtime       tamsulosin 0.4 mg Cp24   Commonly known as:  FLOMAX   Quantity:  60 capsule   Refills:  11   Dose:  0.8 mg    Instructions:  Take 2 capsules (0.8 mg total) by mouth every evening.     Begin Date    " AM    Noon    PM    Bedtime       torsemide 20 MG Tab   Commonly known as:  DEMADEX   Quantity:  60 tablet   Refills:  11   Dose:  40 mg    Instructions:  Take 2 tablets (40 mg total) by mouth once daily.     Begin Date    AM    Noon    PM    Bedtime         STOP taking these medications     calcium-vitamin D3 500 mg(1,250mg) -200 unit per tablet       doxepin 5 % cream   Commonly known as:  ZONALON       mupirocin 2 % ointment   Commonly known as:  BACTROBAN                  Please bring to all follow up appointments:    1. A copy of your discharge instructions.  2. All medicines you are currently taking in their original bottles.  3. Identification and insurance card.    Please arrive 15 minutes ahead of scheduled appointment time.    Please call 24 hours in advance if you must reschedule your appointment and/or time.        Your Scheduled Appointments     Mar 17, 2017 10:00 AM CDT   Infusion 15 Min with CHAIR 01 [facility]    [address] [place] [address] [id number] [phone]            Mar 20, 2017 10:00 AM CDT   Non-Fasting Lab with LABORATORY, [facility] [address]  [facility] [address]               Mar 20, 2017 10:10 AM CDT   Urine with SPECIMEN LAB, [facility]    [address] [place] [address] [address]  [facility] [address]               Mar 24, 2017 10:00 AM CDT   Infusion 15 Min with CHAIR 02 [facility]    [address] [place] [id number] [phone]            Mar 24, 2017 10:30 AM CDT   Established Patient Visit with Bonilla Zambrano MD   [facility]    [address] [place] [address] [id number] [phone]              Follow-up Information     Follow up with Rukhsana Zuluaga MD.    Specialty:  Gastroenterology    Why:  As scheduled    Contact information:    [address] [place]  [place] [address]  [phone]          Discharge Instructions     Future Orders    Activity as tolerated     Call MD for:  severe uncontrolled pain     Call MD for:  temperature >100.4     Diet general     Questions:    Total calories:      Fat restriction, if any:      Protein restriction, if any:      Na restriction, if any:      Fluid restriction:      Additional restrictions:          Admission Information     Date & Time Provider Department CSN    3/13/2017  8:59 AM Rukhsana Zuluaga MD [facility] [id number]      Care Providers     Provider Role Specialty Primary office phone    Rukhsana Zuluaga MD Attending Provider Gastroenterology [phone]    Rukhsana Zuluaga MD Surgeon  Gastroenterology [phone]      Your Vitals Were     BP Pulse Temp Resp SpO2       107/73 94 98.4 °F (36.9 °C) (Oral) 18 98%       Recent Lab Values        12/3/2015 2/26/2016 3/16/2016 7/6/2016 9/18/2016 10/4/2016 12/15/2016 1/23/2017      9:40 AM 10:52 AM  8:55 AM  9:15 AM  4:30 PM  8:48 PM  4:30 AM  9:05 AM    A1C 7.9 (H) 8.2 (H) 8.2 (H) 7.4 (H) 7.0 (H) 7.0 (H) 7.4 (H) 7.1 (H)    Comment for A1C at  9:15 AM on 7/6/2016:  According to ADA guidelines, hemoglobin A1C <7.0% represents  optimal control in non-pregnant diabetic patients.  Different  metrics may apply to specific populations.   Standards of Medical Care in Diabetes - 2016.  For the purpose of screening for the presence of diabetes:  <5.7%     Consistent with the absence of diabetes  5.7-6.4%  Consistent with increasing risk for diabetes   (prediabetes)  >or=6.5%  Consistent with diabetes  Currently no consensus exists for use of hemoglobin A1C  for diagnosis of diabetes for children.      Comment for A1C at  4:30 PM on 9/18/2016:  According to ADA guidelines, hemoglobin A1C <7.0% represents  optimal control in non-pregnant diabetic patients.  Different  metrics may apply to specific populations.   Standards of Medical Care in Diabetes - 2016.  For the purpose of screening for  the presence of diabetes:  <5.7%     Consistent with the absence of diabetes  5.7-6.4%  Consistent with increasing risk for diabetes   (prediabetes)  >or=6.5%  Consistent with diabetes  Currently no consensus exists for use of hemoglobin A1C  for diagnosis of diabetes for children.      Comment for A1C at  8:48 PM on 10/4/2016:  According to ADA guidelines, hemoglobin A1C <7.0% represents  optimal control in non-pregnant diabetic patients.  Different  metrics may apply to specific populations.   Standards of Medical Care in Diabetes - 2016.  For the purpose of screening for the presence of diabetes:  <5.7%     Consistent with the absence of diabetes  5.7-6.4%  Consistent with increasing risk for diabetes   (prediabetes)  >or=6.5%  Consistent with diabetes  Currently no consensus exists for use of hemoglobin A1C  for diagnosis of diabetes for children.      Comment for A1C at  4:30 AM on 12/15/2016:  According to ADA guidelines, hemoglobin A1C <7.0% represents  optimal control in non-pregnant diabetic patients.  Different  metrics may apply to specific populations.   Standards of Medical Care in Diabetes - 2016.  For the purpose of screening for the presence of diabetes:  <5.7%     Consistent with the absence of diabetes  5.7-6.4%  Consistent with increasing risk for diabetes   (prediabetes)  >or=6.5%  Consistent with diabetes  Currently no consensus exists for use of hemoglobin A1C  for diagnosis of diabetes for children.      Comment for A1C at  9:05 AM on 1/23/2017:  According to ADA guidelines, hemoglobin A1C <7.0% represents  optimal control in non-pregnant diabetic patients.  Different  metrics may apply to specific populations.   Standards of Medical Care in Diabetes - 2016.  For the purpose of screening for the presence of diabetes:  <5.7%     Consistent with the absence of diabetes  5.7-6.4%  Consistent with increasing risk for diabetes   (prediabetes)  >or=6.5%  Consistent with diabetes  Currently no  consensus exists for use of hemoglobin A1C  for diagnosis of diabetes for children.        Pending Labs     Order Current Status    Specimen to Pathology - Surgery Collected (03/13/17 1039)    Protime-INR In process      Allergies as of 3/13/2017        Reactions    No Known Drug Allergies       [facility]     [facility] Nurse Care Line - 24/7 Assistance  Unless otherwise directed by your provider, please contact [facility], our nurse care line that is available for 24/7 assistance.     Registered nurses in the [facility] provide clinical advisement, health education, appointment booking, and other advisory services.  Call for this free service at [phone].        Advance Directives     An advance directive is a document which, in the event you are no longer able to make decisions for yourself, tells your healthcare team what kind of treatment you do or do not want to receive, or who you would like to make those decisions for you.  If you do not currently have an advance directive, [facility] encourages you to create one.  For more information call:  [phone]), [phone] ([phone]),  or log on to [email or web address].        Language Assistance Services     ATTENTION: Language assistance services are available, free of charge. Please call [phone].      ATENCIÓN: Si habla español, tiene a hernandez disposición servicios gratuitos de asistencia lingüística. Llame al [phone].     CHÚ Ý: N?u b?n nói Ti?ng Vi?t, có các d?ch v? h? tr? ngôn ng? mi?n phí dành cho b?n. G?i s? [phone].        Chronic Kindey Disease Education             Diabetes Discharge Instructions                                    [facility]  complies with applicable Federal civil rights laws and does not discriminate on the basis of race, color, national origin, age, disability, or sex.

## 2017-03-13 NOTE — BRIEF OP NOTE
[facility] -   Brief Operative Note     SUMMARY     Surgery Date: 3/13/2017     Surgeon(s) and Role:     * Rukhsana Zuluaga MD - Primary    Assisting Surgeon: None    Pre-op Diagnosis:  Cryptogenic cirrhosis [K74.69]  Iron deficiency anemia, unspecified iron deficiency anemia type [D50.9]    Post-op Diagnosis:  Post-Op Diagnosis Codes:     * Cryptogenic cirrhosis [K74.69]     * Iron deficiency anemia, unspecified iron deficiency anemia type [D50.9]    Procedure(s) (LRB):  ESOPHAGOGASTRODUODENOSCOPY (EGD) (N/A)    Anesthesia: Monitor Anesthesia Care    Description of the findings of the procedure: Patient examined at the end of exam and was stable.  Procedure was well tolerated.  Details of findings can be found in the procedure note.    Findings/Key Components: Details of findings can be found in the procedure note.    Estimated Blood Loss: Minimal         Specimens:   Specimen (12h ago through future)    Start     Ordered    03/13/17 1035  Specimen to Pathology - Surgery  Once     Comments:  1.  SMALL BOWEL BIOPSY - R/O CELIAC DISEASE  2.  GASTRIC BIOPSY - R/O H PYLORI  3.  GASTRIC POLYPS    03/13/17 1039          Discharge Note    SUMMARY     Admit Date: 3/13/2017    Discharge Date and Time: 3/13/2017    Hospital Course (synopsis of major diagnoses, care, treatment, and services provided during the course of the hospital stay): Patient examined at the end of exam and was stable.  Procedure was well tolerated.  Details of findings can be found in the procedure note.    Final Diagnosis: Post-Op Diagnosis Codes:     * Cryptogenic cirrhosis [K74.69]     * Iron deficiency anemia, unspecified iron deficiency anemia type [D50.9]    Disposition: Home or Self Care    Follow Up/Patient Instructions:     Medications:  Reconciled Home Medications:   Current Discharge Medication List      CONTINUE these medications which have NOT CHANGED    Details   alendronate (FOSAMAX) 70 MG tablet       aspirin (ECOTRIN) 81 MG EC  "tablet Take 1 tablet (81 mg total) by mouth once daily.  Qty: 30 tablet, Refills: 11      escitalopram oxalate (LEXAPRO) 20 MG tablet Take 1 tablet (20 mg total) by mouth once daily.  Qty: 30 tablet, Refills: 11      gabapentin (NEURONTIN) 100 MG capsule Take 3 capsules (300 mg total) by mouth 3 (three) times daily.  Qty: 360 capsule, Refills: 11      insulin detemir (LEVEMIR FLEXTOUCH) 100 unit/mL (3 mL) SubQ InPn pen Inject 20 Units into the skin 2 (two) times daily.  Qty: 15 mL, Refills: 12      insulin lispro (HUMALOG) 100 unit/mL injection Inject 10 Units into the skin 3 (three) times daily before meals.  Qty: 15 mL, Refills: 12      insulin needles, disposable, 32 x 5/32 " Ndle 1 Device by Misc.(Non-Drug; Combo Route) route 5 (five) times daily.  Qty: 200 each, Refills: 12      lancets Misc 1 Device by Misc.(Non-Drug; Combo Route) route 4 (four) times daily with meals and nightly.  Qty: 100 each, Refills: 5    Comments: ON HOLD, patient will request fill once discharged from       levothyroxine (SYNTHROID) 150 MCG tablet TAKE 1 TABLET BY MOUTH EVERY MORNING BEFORE BREAKFAST  Qty: 30 tablet, Refills: 0      magnesium oxide (MAG-OX) 400 mg tablet Take 1 tablet (400 mg total) by mouth once daily.  Qty: 30 tablet, Refills: 11      mycophenolate (MYFORTIC) 180 MG TbEC Take 4 tablets (720 mg total) by mouth 2 (two) times daily. S/P Heart Transplant 11/18/2014 ICD-10 Z94.1  Qty: 240 tablet, Refills: 11    Associated Diagnoses: Status post heart transplant; Long-term use of immunosuppressant medication; Immunosuppression      nortriptyline (PAMELOR) 25 MG capsule Take 1 capsule (25 mg total) by mouth every evening.  Qty: 30 capsule, Refills: 3      pantoprazole (PROTONIX) 40 MG tablet TAKE ONE TABLET BY MOUTH EVERY DAY  Qty: 30 tablet, Refills: 11      pravastatin (PRAVACHOL) 40 MG tablet Take 1 tablet (40 mg total) by mouth every evening.  Qty: 30 tablet, Refills: 11      predniSONE (DELTASONE) 2.5 MG tablet Take " 1 tablet (2.5 mg total) by mouth once daily. S/P Heart Transplant 11/18/2014 ICD-10 Z94.1  Qty: 30 tablet, Refills: 11    Associated Diagnoses: Heart transplanted; Essential hypertension; Chronic kidney disease (CKD), stage III (moderate); Immunosuppression; Status post heart transplant; Long-term use of immunosuppressant medication      tacrolimus (PROGRAF) 1 MG Cap Taked 2mg orally in the morning and 3mg orally in the evening.   S/p heart transplant  11/18/2014  ICD-10 Z94.1  Qty: 150 capsule, Refills: 11    Associated Diagnoses: Status post heart transplant; Long-term use of immunosuppressant medication; Immunosuppression      tamsulosin (FLOMAX) 0.4 mg Cp24 Take 2 capsules (0.8 mg total) by mouth every evening.  Qty: 60 capsule, Refills: 11    Associated Diagnoses: Heart transplanted; Essential hypertension; Chronic kidney disease (CKD), stage III (moderate); Immunosuppression      torsemide (DEMADEX) 20 MG Tab Take 2 tablets (40 mg total) by mouth once daily.  Qty: 60 tablet, Refills: 11         STOP taking these medications       calcium-vitamin D3 500 mg(1,250mg) -200 unit per tablet Comments:   Reason for Stopping:         doxepin (ZONALON) 5 % cream Comments:   Reason for Stopping:         mupirocin (BACTROBAN) 2 % ointment Comments:   Reason for Stopping:               Discharge Procedure Orders  Diet general     Activity as tolerated     Call MD for:  temperature >100.4     Call MD for:  severe uncontrolled pain       Follow-up Information     Follow up with Rukhsana Zuluaga MD.    Specialty:  Gastroenterology    Why:  As scheduled    Contact information:    [address] [place] [address] [phone]

## 2017-03-13 NOTE — ANESTHESIA RELEASE NOTE
Anesthesia Release from PACU Note    Patient: Lv Crocker    Procedure(s) Performed: Procedure(s) (LRB):  ESOPHAGOGASTRODUODENOSCOPY (EGD) (N/A)    Anesthesia type: MAC    Post pain: Adequate analgesia    Post assessment: no apparent anesthetic complications, tolerated procedure well and no evidence of recall    Last Vitals:   Visit Vitals    BP (!) 84/44 (BP Location: Left arm, Patient Position: Lying, BP Method: Automatic)    Pulse 88    Temp 36.9 °C (98.4 °F) (Oral)    Resp 18    SpO2 96%       Post vital signs: stable    Level of consciousness: awake, alert  and oriented    Nausea/Vomiting: no nausea/no vomiting    Complications: none    Airway Patency: patent    Respiratory: unassisted, spontaneous ventilation, room air    Cardiovascular: stable and blood pressure at baseline    Hydration: euvolemic

## 2017-03-13 NOTE — INTERVAL H&P NOTE
The patient has been examined and the H&P has been reviewed:    I concur with the findings and no changes have occurred since H&P was written.    Anesthesia/Surgery risks, benefits and alternative options discussed and understood by patient/family.          Active Hospital Problems    Diagnosis  POA    Cirrhosis [K74.60]  Yes      Resolved Hospital Problems    Diagnosis Date Resolved POA   No resolved problems to display.       Possible cirrhosis also GARTH needs eval proceed with EGD.

## 2017-03-13 NOTE — TRANSFER OF CARE
Anesthesia Transfer of Care Note    Patient: Lv Crocker    Procedure(s) Performed: Procedure(s) (LRB):  ESOPHAGOGASTRODUODENOSCOPY (EGD) (N/A)    Patient location: GI    Anesthesia Type: MAC    Transport from OR: Transported from OR on room air with adequate spontaneous ventilation    Post pain: adequate analgesia    Post assessment: no apparent anesthetic complications    Post vital signs: stable    Level of consciousness: awake, alert and oriented    Nausea/Vomiting: no nausea/vomiting    Complications: none          Last vitals:   Visit Vitals    /73    Pulse 94    Temp 36.9 °C (98.4 °F) (Oral)    Resp 18    SpO2 98%

## 2017-03-16 DIAGNOSIS — R18.8 OTHER ASCITES: Primary | ICD-10-CM

## 2017-03-16 NOTE — Clinical Note
Hi Chandni, are you ok if he gets a transjug liver bx? The ascites also appears chylous, do you see this after heart transplant?

## 2017-03-16 NOTE — PROGRESS NOTES
Discussed with patient at time of upper endoscopy that I would like to proceed with a transjugular liver biopsy for evaluation of portal pressure measurements as well as right heart pressures and staging of liver.  Will check with heart failure team to see if they're okay with transjugular liver biopsy.

## 2017-03-17 ENCOUNTER — INFUSION (OUTPATIENT)
Dept: INFUSION THERAPY | Facility: HOSPITAL | Age: 42
End: 2017-03-17
Attending: INTERNAL MEDICINE
Payer: MEDICARE

## 2017-03-17 VITALS
WEIGHT: 186.94 LBS | DIASTOLIC BLOOD PRESSURE: 67 MMHG | TEMPERATURE: 99 F | BODY MASS INDEX: 29.34 KG/M2 | HEIGHT: 67 IN | RESPIRATION RATE: 20 BRPM | HEART RATE: 100 BPM | SYSTOLIC BLOOD PRESSURE: 104 MMHG

## 2017-03-17 DIAGNOSIS — E10.22 TYPE 1 DIABETES MELLITUS WITH STAGE 3 CHRONIC KIDNEY DISEASE: Primary | ICD-10-CM

## 2017-03-17 DIAGNOSIS — D63.1 ANEMIA OF CHRONIC RENAL FAILURE, STAGE 3 (MODERATE): ICD-10-CM

## 2017-03-17 DIAGNOSIS — N18.30 ANEMIA OF CHRONIC RENAL FAILURE, STAGE 3 (MODERATE): ICD-10-CM

## 2017-03-17 DIAGNOSIS — N18.30 TYPE 1 DIABETES MELLITUS WITH STAGE 3 CHRONIC KIDNEY DISEASE: Primary | ICD-10-CM

## 2017-03-17 DIAGNOSIS — N18.30 CHRONIC KIDNEY DISEASE (CKD), STAGE III (MODERATE): ICD-10-CM

## 2017-03-17 PROCEDURE — 96372 THER/PROPH/DIAG INJ SC/IM: CPT

## 2017-03-17 PROCEDURE — 63600175 PHARM REV CODE 636 W HCPCS: Performed by: INTERNAL MEDICINE

## 2017-03-17 RX ADMIN — ERYTHROPOIETIN 20000 UNITS: 20000 INJECTION, SOLUTION INTRAVENOUS; SUBCUTANEOUS at 12:03
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
## 2017-03-17 NOTE — NURSING
3/17/17  1213  Injection given without difficulties.Bandaid applied. Patient instructed to stay in the clinic for 15 minutes. Patient verbalized understanding and will notify nurse with any complaints.

## 2017-03-20 ENCOUNTER — PATIENT MESSAGE (OUTPATIENT)
Dept: GASTROENTEROLOGY | Facility: CLINIC | Age: 42
End: 2017-03-20

## 2017-03-20 ENCOUNTER — PATIENT MESSAGE (OUTPATIENT)
Dept: TRANSPLANT | Facility: CLINIC | Age: 42
End: 2017-03-20

## 2017-03-20 ENCOUNTER — LAB VISIT (OUTPATIENT)
Dept: LAB | Facility: HOSPITAL | Age: 42
End: 2017-03-20
Attending: INTERNAL MEDICINE
Payer: MEDICARE

## 2017-03-20 DIAGNOSIS — D63.1 ANEMIA OF CHRONIC RENAL FAILURE, STAGE 3 (MODERATE): ICD-10-CM

## 2017-03-20 DIAGNOSIS — N18.30 ANEMIA OF CHRONIC RENAL FAILURE, STAGE 3 (MODERATE): ICD-10-CM

## 2017-03-20 DIAGNOSIS — D50.9 IRON DEFICIENCY ANEMIA, UNSPECIFIED IRON DEFICIENCY ANEMIA TYPE: ICD-10-CM

## 2017-03-20 DIAGNOSIS — N18.30 CHRONIC KIDNEY DISEASE (CKD), STAGE III (MODERATE): ICD-10-CM

## 2017-03-20 LAB
ALBUMIN SERPL BCP-MCNC: 3.6 G/DL
ANION GAP SERPL CALC-SCNC: 15 MMOL/L
BASOPHILS # BLD AUTO: 0.01 K/UL
BASOPHILS NFR BLD: 0.2 %
BUN SERPL-MCNC: 32 MG/DL
CALCIUM SERPL-MCNC: 9.3 MG/DL
CHLORIDE SERPL-SCNC: 101 MMOL/L
CO2 SERPL-SCNC: 26 MMOL/L
CREAT SERPL-MCNC: 2.6 MG/DL
DIFFERENTIAL METHOD: ABNORMAL
EOSINOPHIL # BLD AUTO: 0.1 K/UL
EOSINOPHIL NFR BLD: 2.9 %
ERYTHROCYTE [DISTWIDTH] IN BLOOD BY AUTOMATED COUNT: 14.4 %
ERYTHROCYTE [SEDIMENTATION RATE] IN BLOOD BY WESTERGREN METHOD: 22 MM/HR
EST. GFR  (AFRICAN AMERICAN): 33.4 ML/MIN/1.73 M^2
EST. GFR  (NON AFRICAN AMERICAN): 28.9 ML/MIN/1.73 M^2
FERRITIN SERPL-MCNC: 959 NG/ML
GLUCOSE SERPL-MCNC: 188 MG/DL
HCT VFR BLD AUTO: 35.1 %
HGB BLD-MCNC: 10.8 G/DL
IRON SERPL-MCNC: 54 UG/DL
LYMPHOCYTES # BLD AUTO: 0.9 K/UL
LYMPHOCYTES NFR BLD: 19.5 %
MCH RBC QN AUTO: 29.6 PG
MCHC RBC AUTO-ENTMCNC: 30.8 %
MCV RBC AUTO: 96 FL
MONOCYTES # BLD AUTO: 0.8 K/UL
MONOCYTES NFR BLD: 17.7 %
NEUTROPHILS # BLD AUTO: 2.7 K/UL
NEUTROPHILS NFR BLD: 58.8 %
PHOSPHATE SERPL-MCNC: 4.1 MG/DL
PLATELET # BLD AUTO: 248 K/UL
PMV BLD AUTO: 10.5 FL
POTASSIUM SERPL-SCNC: 4.4 MMOL/L
RBC # BLD AUTO: 3.65 M/UL
SATURATED IRON: 18 %
SODIUM SERPL-SCNC: 142 MMOL/L
TOTAL IRON BINDING CAPACITY: 297 UG/DL
TRANSFERRIN SERPL-MCNC: 201 MG/DL
WBC # BLD AUTO: 4.51 K/UL

## 2017-03-20 PROCEDURE — 83540 ASSAY OF IRON: CPT

## 2017-03-20 PROCEDURE — 85651 RBC SED RATE NONAUTOMATED: CPT

## 2017-03-20 PROCEDURE — 85025 COMPLETE CBC W/AUTO DIFF WBC: CPT

## 2017-03-20 PROCEDURE — 80069 RENAL FUNCTION PANEL: CPT

## 2017-03-20 PROCEDURE — 36415 COLL VENOUS BLD VENIPUNCTURE: CPT | Mod: PO

## 2017-03-20 PROCEDURE — 82728 ASSAY OF FERRITIN: CPT

## 2017-03-20 RX ORDER — LEVOTHYROXINE SODIUM 150 UG/1
TABLET ORAL
Qty: 30 TABLET | Refills: 0 | Status: SHIPPED | OUTPATIENT
Start: 2017-03-20 | End: 2017-04-05 | Stop reason: SDUPTHER

## 2017-03-21 ENCOUNTER — TELEPHONE (OUTPATIENT)
Dept: GASTROENTEROLOGY | Facility: CLINIC | Age: 42
End: 2017-03-21

## 2017-03-21 NOTE — TELEPHONE ENCOUNTER
Spoke with Rosa in Radiology. Informed her need to schedule a Transjugular liver biopsy with Dr. Arce, she verbalized understanding.

## 2017-03-24 ENCOUNTER — OFFICE VISIT (OUTPATIENT)
Dept: HEMATOLOGY/ONCOLOGY | Facility: CLINIC | Age: 42
End: 2017-03-24
Payer: MEDICARE

## 2017-03-24 ENCOUNTER — OFFICE VISIT (OUTPATIENT)
Dept: NEPHROLOGY | Facility: CLINIC | Age: 42
End: 2017-03-24
Payer: MEDICARE

## 2017-03-24 VITALS
DIASTOLIC BLOOD PRESSURE: 60 MMHG | SYSTOLIC BLOOD PRESSURE: 100 MMHG | BODY MASS INDEX: 29.45 KG/M2 | HEIGHT: 67 IN | SYSTOLIC BLOOD PRESSURE: 120 MMHG | HEIGHT: 67 IN | HEART RATE: 103 BPM | OXYGEN SATURATION: 97 % | BODY MASS INDEX: 29.38 KG/M2 | DIASTOLIC BLOOD PRESSURE: 72 MMHG | HEART RATE: 103 BPM | TEMPERATURE: 98 F | WEIGHT: 187.19 LBS | WEIGHT: 187.63 LBS

## 2017-03-24 DIAGNOSIS — D63.1 ANEMIA OF CHRONIC RENAL FAILURE, STAGE 3 (MODERATE): Primary | ICD-10-CM

## 2017-03-24 DIAGNOSIS — N18.4 CHRONIC KIDNEY DISEASE (CKD), STAGE IV (SEVERE): Primary | ICD-10-CM

## 2017-03-24 DIAGNOSIS — D50.9 IRON DEFICIENCY ANEMIA, UNSPECIFIED: ICD-10-CM

## 2017-03-24 DIAGNOSIS — N18.30 ANEMIA OF CHRONIC RENAL FAILURE, STAGE 3 (MODERATE): Primary | ICD-10-CM

## 2017-03-24 PROCEDURE — 99999 PR PBB SHADOW E&M-EST. PATIENT-LVL III: CPT | Mod: PBBFAC,,, | Performed by: INTERNAL MEDICINE

## 2017-03-24 PROCEDURE — 99214 OFFICE O/P EST MOD 30 MIN: CPT | Mod: S$GLB,,, | Performed by: INTERNAL MEDICINE

## 2017-03-24 NOTE — PROGRESS NOTES
Reason for visit: Chronic normocytic anemia    HPI:   The patient is a 41-year-old  male who presents to the clinic today for follow up to discuss further evaluation and management recommendations for chronic normocytic anemia.  The patient has had treatment of antibody mediated rejection with regard to his history of heart transplant.  During the course of that hospitalization he was also noted to be neutropenic and was also treated for C. difficile colitis.  Hem/Onc evaluated him and a bone marrow aspiration and biopsy was also performed.  He was given intermittent Neupogen injections for support with regard to his neutropenia.  The patient also had treatment for CMV infection and was treated with IV ganciclovir.  He was previously also on by mouth valganciclovir which has since been stopped.  He continues to be on Prograf, mycophenolate and prednisone for immunosuppression.    Today the patient reports that he continues on oral Demadex. He continues to have problems with volume status and abnormal kidney function.  The patient denies any fevers, chills or night sweats at this time.  He denies any melena, hematochezia, and emesis, hemoptysis or hematuria.  He denies any chest pain.  I have reviewed all of the details with regard to the patient's medical history available in Epic and have utilized this in my evaluation and management recommendations today.    PAST MEDICAL HISTORY:   1.  Type 1 diabetes mellitus  2.  Hypothyroidism due to Hashimoto's thyroiditis  3.  Chronic kidney disease stage III  4.  Restrictive cardiomyopathy  5.  CMV infection  6.  Chronic immunosuppression  7.  C. difficile colitis  8.  Osteopenia  9.  BPH  10. Traumatic compression fracture of T12    SURGICAL HISTORY:   1.  Bilateral arthroscopy of the knees  2.  Right ankle fusion  3.  Cholecystectomy    FAMILY HISTORY: The patient's brother  from complications related to pancreatic cancer at the age of 51.  He denies any other  immediate family members with cancer or bleeding/clotting disorders.    SOCIAL HISTORY: He does not smoke cigarettes.  He does not drink alcohol.  He has never used any recreational drugs.  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  He lives in [place], [place].    ALLERGIES: [NKDA]    MEDICATIONS: [Medcard has been reviewed and/or reconciled.]    REVIEW OF SYSTEMS:   GENERAL: [No fevers, chills or sweats. Reports chronic fatigue. Denies weight loss or loss of appetite.]  HEENT: [No blurred vision, tinnitus, nasal discharge, sorethroat or dysphagia.]  HEART: [No chest pain, palpitations or shortness of breath.]    LUNGS: [No cough, hemoptysis or breathing problems.]  ABDOMEN: [No abdominal pain, nausea, vomiting, diarrhea, constipation or melena.]  GENITOURINARY: [No dysuria, bleeding or malodorous discharge.]  NEURO: [No headache, dizziness or vertigo.]  HEMATOLOGY: [No easy bruising, spontaneous bleeding or blood clots in the past].  MUSCULOSKELETAL: [No arthralgias, myalgias or bone pains.]  SKIN: [No rashes or skin lesions.]  PSYCHIATRY: [No depression or anxiety.]    PHYSICAL EXAMINATION:   VS: Reviewed on nurse's notes.  APPEARANCE: The patient is a chronically ill appearing and well-groomed  male who appears in no acute distress.  HEENT: No scleral icterus. Both external auditory canals clear. No oral ulcers, lesions. Throat clear  HEAD: No sinus tenderness.  NECK: Supple. No palpable lymphadenopathy. Thyroid non-tender, no palpable masses  CHEST: Decreased breath sounds in the right lung likely due to pleural effusion.  No rales. No rhonchi. Unlabored respirations.  CARDIOVASCULAR: Normal S1, S2. Normal rate. Regular rhythm.  ABDOMEN: Bowel sounds normal. No tenderness. No palpable hepatosplenomegaly. Mild abdominal distension.  LYMPHATIC: No palpable supraclavicular, axillary nodes  EXTREMITIES: No clubbing, cyanosis.  Trace pitting edema of  bilateral lower extremities.  SKIN: No lesions. No petechiae. No ecchymoses. No induration or nodules  NEUROLOGIC: No focal findings. Alert & Oriented x 3. Mood appropriate to affect    LABS:   Reviewed    IMAGING:  Reviewed    IMPRESSION:  1.  Chronic normocytic anemia  2.  Orthotopic heart transplant with chronic immunosuppression  3.  Iron deficiency anemia  4.  Anemia due to chronic kidney disease  5.  Cryptogenic cirrhosis      PLAN:  1.  I had a detailed discussion with the patient today with regard to his current clinical situation.  The results of his bone marrow aspiration and biopsy from May 2015 were previously discussed in detail with the patient.  He did have decreased storage iron.  He has been given 2 doses of IV iron in the past and tolerated them well. He is on Procrit injections as needed for treatment of anemia due to chronic kidney disease. There was no definitive evidence of leukemia or lymphoma noted on the results of his bone marrow aspiration and biopsy.  Cytogenetics were normal.  2.  He has been given 2 weekly doses of IV injectafer because recent iron studies did show decreased iron saturation. CBC from 3/20/17 shows Hb>10 gm/dl. Therefore I will hold additional procrit at this time.  3. Continue follow up with Dr. Zuluaga for management of cirrhosis of the liver. He is being scheduled for transjugular liver biopsy for further evaluation.    Follow up in 6 weeks with labs. Possible procrit at follow up. He knows to call sooner for any new problems or questions.    Adolfo Robles MD

## 2017-03-24 NOTE — PROGRESS NOTES
Subjective:       Patient ID: Lv Crocker is a 41 y.o. White male who presents for follow-up evaluation of  CKD stage 4, HTN, SHPT,     Maribell Chase MD      HPI : Lv Crocker is a pleasant 41-year-old  gentleman seen in office today in f/u for above medical problems.  I saw him for initial consult about 4 months ago.  Hospital records and other provider notes in the interim were reviewed.  Patient was admitted in the hospital about 3 months ago for acute decompensated heart failure.  Also since this hospitalization he has been requiring regular paracentesis.  Recent notes by GI reviewed.  Patient had an EGD that was a negative study, he is scheduled to undergo a liver biopsy to assess portal pressures and hepatic damage if any.  Patient has been on Prograf for redness cardiac transplantation.  He has chronic kidney disease which is multifactorial.  heart transplant surgery was in November 2014, for cardiomyopathy from diabetes. Patient developed acute renal failure in the postoperative period, not requiring renal replacement therapy. Since then he has chronic kidney disease, his serum creatinine fluctuates between 2 and 3 mg/dL. Most recent creatinine is stable at  2.6 mg/dL with a GFR of about 28 mL per minute.  He is currently on Prograf 2/3 mg daily , Myfortic 720 g twice a day and prednisone 2.5 mg daily.       Past Medical History:   Diagnosis Date    Arthritis     CAD (coronary artery disease)     Coronary artery disease    Cardiomyopathy     CHF (congestive heart failure)     Depression     Encounter for blood transfusion     GERD (gastroesophageal reflux disease)     Hashimoto's disease     HLD (hyperlipidemia) 6/11/2015    Hypoglycemia unawareness in type 1 diabetes mellitus     Hypothyroid     Myocardial infarction     Syncope 6/30/2015    Type I (juvenile type) diabetes mellitus with unspecified complication, uncontrolled     Diagnosis at 6 years old     "Unspecified essential hypertension 5/29/2014         Current Outpatient Prescriptions on File Prior to Visit   Medication Sig Dispense Refill    alendronate (FOSAMAX) 70 MG tablet       aspirin (ECOTRIN) 81 MG EC tablet Take 1 tablet (81 mg total) by mouth once daily. 30 tablet 11    escitalopram oxalate (LEXAPRO) 20 MG tablet Take 1 tablet (20 mg total) by mouth once daily. 30 tablet 11    gabapentin (NEURONTIN) 100 MG capsule Take 3 capsules (300 mg total) by mouth 3 (three) times daily. 360 capsule 11    insulin detemir (LEVEMIR FLEXTOUCH) 100 unit/mL (3 mL) SubQ InPn pen Inject 20 Units into the skin 2 (two) times daily. 15 mL 12    insulin lispro (HUMALOG) 100 unit/mL injection Inject 10 Units into the skin 3 (three) times daily before meals. 15 mL 12    insulin needles, disposable, 32 x 5/32 " Ndle 1 Device by Misc.(Non-Drug; Combo Route) route 5 (five) times daily. 200 each 12    lancets Misc 1 Device by Misc.(Non-Drug; Combo Route) route 4 (four) times daily with meals and nightly. 100 each 5    levothyroxine (SYNTHROID) 150 MCG tablet TAKE 1 TABLET BY MOUTH EVERY MORNING BEFORE BREAKFAST 30 tablet 0    magnesium oxide (MAG-OX) 400 mg tablet Take 1 tablet (400 mg total) by mouth once daily. 30 tablet 11    mycophenolate (MYFORTIC) 180 MG TbEC Take 4 tablets (720 mg total) by mouth 2 (two) times daily. S/P Heart Transplant 11/18/2014 ICD-10 Z94.1 240 tablet 11    nortriptyline (PAMELOR) 25 MG capsule Take 1 capsule (25 mg total) by mouth every evening. 30 capsule 3    pantoprazole (PROTONIX) 40 MG tablet TAKE ONE TABLET BY MOUTH EVERY DAY 30 tablet 11    pravastatin (PRAVACHOL) 40 MG tablet Take 1 tablet (40 mg total) by mouth every evening. 30 tablet 11    predniSONE (DELTASONE) 2.5 MG tablet Take 1 tablet (2.5 mg total) by mouth once daily. S/P Heart Transplant 11/18/2014 ICD-10 Z94.1 30 tablet 11    tacrolimus (PROGRAF) 1 MG Cap Taked 2mg orally in the morning and 3mg orally in the evening. "   S/p heart transplant  2014  ICD-10 Z94.1 150 capsule 11    tamsulosin (FLOMAX) 0.4 mg Cp24 Take 2 capsules (0.8 mg total) by mouth every evening. 60 capsule 11    torsemide (DEMADEX) 20 MG Tab Take 2 tablets (40 mg total) by mouth once daily. 60 tablet 11     No current facility-administered medications on file prior to visit.        FH : Both his parents were on renal replacement therapy before they , end-stage renal disease secondary to diabetes        SH : He is currently living at home with his family, he has 2 sons, currently on disability, he was a teacher by profession in the past, he never smoked or drank alcohol    Review of Systems  :    Constitutional: Positive for activity change and fatigue. Negative for appetite change.   HENT: Negative for congestion and facial swelling.   Eyes: Negative for pain, discharge and redness.   Respiratory: Negative for apnea, cough and chest tightness.   Cardiovascular: Negative for chest pain, palpitations and leg swelling.   Gastrointestinal: Negative for abdominal distention.   Genitourinary: Negative for difficulty urinating, dysuria and frequency.   Musculoskeletal: Positive for arthralgias and back pain. Negative for neck pain and neck stiffness.   Skin: Negative for color change, rash and wound.   Neurological: Negative for dizziness, weakness and numbness.   Psychiatric/Behavioral: Negative for sleep disturbance.   All other systems reviewed and are negative.    :        Objective:           Vitals:    17 1045   BP: 120/60   Pulse: 103       Respiratory rate 20, afebrile, weight 187 pounds, stable    Physical Exam  :      Constitutional: He is oriented to person, place, and time. He appears well-developed and well-nourished. No distress.   HENT: Head: Normocephalic and atraumatic.   Eyes: Pupils are equal, round, and reactive to light.   Neck: Normal range of motion. Neck supple. No tracheal deviation present. No thyromegaly present.    Cardiovascular: Normal rate, regular rhythm, normal heart sounds and intact distal pulses. Exam reveals no gallop and no friction rub.   No murmur heard.  Pulmonary/Chest: Effort normal and breath sounds normal. He has no wheezes. He has no rales.   Abdominal: Soft. He exhibits no mass. There is no tenderness. There is no rebound and no guarding.   Obese  , distended   Musculoskeletal: Normal range of motion. He exhibits no edema.   Lymphadenopathy: He has no cervical adenopathy.   Neurological: He is alert and oriented to person, place, and time.   Skin: Skin is warm. No rash noted. He is not diaphoretic. No erythema.   Nursing note and vitals reviewed.      Labs :    Lab Results   Component Value Date    CREATININE 2.6 (H) 03/20/2017    BUN 32 (H) 03/20/2017     03/20/2017    K 4.4 03/20/2017     03/20/2017    CO2 26 03/20/2017       Lab Results   Component Value Date    WBC 4.51 03/20/2017    HGB 10.8 (L) 03/20/2017    HCT 35.1 (L) 03/20/2017    MCV 96 03/20/2017     03/20/2017       Lab Results   Component Value Date    PTH 88.0 (H) 11/21/2016    CALCIUM 9.3 03/20/2017    PHOS 4.1 03/20/2017       Lab Results   Component Value Date    ALBUMIN 3.6 03/20/2017     Lab Results   Component Value Date    ALT 16 01/23/2017    AST 18 01/23/2017    ALKPHOS 158 (H) 01/23/2017    BILITOT 0.7 01/23/2017       Lab Results   Component Value Date    HGBA1C 7.1 (H) 01/23/2017         Impression and Plan : 41-year-old  gentleman seen in office today in consultation for following medical problems     1. Chronic kidney disease stage 4 - due to long-standing history of diabetes, status post cardiac transplant. His serum creatinine fluctuates between 2 and 3 mg/dL depending on his fluid status.  Recent serum creatinine is stable at 2.6 mg/dL.    Strongly advised patient to avoid NSAID use. He is currently on Prograf  2/3 daily, MMF and Pred,      2. Hypertension - blood pressure is controlled on current  regimen ,      3. Volume - recent echocardiograms reports normal LV function, is currently on Demadex 40 mg daily ,  discussed fluid restriction of about 40 ounces a day and salt restricted diet.     4. Anemia of chronic kidney disease - recent hemoglobin is 10.8 g, he is following with hematology Department     5. Secondary hyperparathyroidism -  his last PTH is acceptable at 88, will continue to monitor     6. Urinalysis shows normal range proteinuria.    7.  Ascites - he is requiring regular paracentesis.  He is scheduled to undergo a liver biopsy and to assess portal pressures.    8.  Diabetes mellitus type 2 - well-controlled on current regimen.        Return to clinic in about 12 weeks . More than 25 minutes of face-to-face time was spent with the patient discussing labs and plan of care.      Sincerely,     Bonilla Zambrano MD

## 2017-03-24 NOTE — MR AVS SNAPSHOT
[facility]  [address] [facility] [facility] [address]  [place] [address]  Phone: [phone]  Fax: [phone]                  Lv Crocker   3/24/2017 10:30 AM   Office Visit    Description:  Male : 1975   Provider:  Bonilla Zambrano MD   Department:  [facility]           Diagnoses this Visit        Comments    Chronic kidney disease (CKD), stage IV (severe)    -  Primary            To Do List           Future Appointments        Provider Department Dept Phone    3/29/2017 2:30 PM Rukhsana Zuluaga MD [facility] Gastroenterology [phone]    2017 9:00 AM GASTRO NURSE, [facility] Gastroenterology [phone]      Goals (5 Years of Data)     None      Follow-Up and Disposition     Return in about 3 months (around 2017).    Follow-up and Disposition History      [facility]     [facility] On Call Nurse [facility] Line -  Assistance  Registered nurses in the [facility] provide clinical advisement, health education, appointment booking, and other advisory services.  Call for this free service at [phone].             Medications           Message regarding Medications     Verify the changes and/or additions to your medication regime listed below are the same as discussed with your clinician today.  If any of these changes or additions are incorrect, please notify your healthcare provider.             Verify that the below list of medications is an accurate representation of the medications you are currently taking.  If none reported, the list may be blank. If incorrect, please contact your healthcare provider. Carry this list with you in case of emergency.           Current Medications     alendronate (FOSAMAX) 70 MG tablet     aspirin (ECOTRIN) 81 MG EC tablet Take 1 tablet (81 mg total) by mouth once daily.    escitalopram oxalate (LEXAPRO) 20 MG tablet Take 1 tablet (20 mg total) by mouth once daily.    gabapentin (NEURONTIN) 100 MG capsule Take 3 capsules  "(300 mg total) by mouth 3 (three) times daily.    insulin detemir (LEVEMIR FLEXTOUCH) 100 unit/mL (3 mL) SubQ InPn pen Inject 20 Units into the skin 2 (two) times daily.    insulin lispro (HUMALOG) 100 unit/mL injection Inject 10 Units into the skin 3 (three) times daily before meals.    insulin needles, disposable, 32 x 5/32 " Ndle 1 Device by Misc.(Non-Drug; Combo Route) route 5 (five) times daily.    lancets Misc 1 Device by Misc.(Non-Drug; Combo Route) route 4 (four) times daily with meals and nightly.    levothyroxine (SYNTHROID) 150 MCG tablet TAKE 1 TABLET BY MOUTH EVERY MORNING BEFORE BREAKFAST    magnesium oxide (MAG-OX) 400 mg tablet Take 1 tablet (400 mg total) by mouth once daily.    mycophenolate (MYFORTIC) 180 MG TbEC Take 4 tablets (720 mg total) by mouth 2 (two) times daily. S/P Heart Transplant 11/18/2014 ICD-10 Z94.1    nortriptyline (PAMELOR) 25 MG capsule Take 1 capsule (25 mg total) by mouth every evening.    pantoprazole (PROTONIX) 40 MG tablet TAKE ONE TABLET BY MOUTH EVERY DAY    pravastatin (PRAVACHOL) 40 MG tablet Take 1 tablet (40 mg total) by mouth every evening.    predniSONE (DELTASONE) 2.5 MG tablet Take 1 tablet (2.5 mg total) by mouth once daily. S/P Heart Transplant 11/18/2014 ICD-10 Z94.1    tacrolimus (PROGRAF) 1 MG Cap Taked 2mg orally in the morning and 3mg orally in the evening.   S/p heart transplant  11/18/2014  ICD-10 Z94.1    tamsulosin (FLOMAX) 0.4 mg Cp24 Take 2 capsules (0.8 mg total) by mouth every evening.    torsemide (DEMADEX) 20 MG Tab Take 2 tablets (40 mg total) by mouth once daily.           Clinical Reference Information           Your Vitals Were     BP Pulse Height Weight BMI    120/60 103 5' 7" (1.702 m) 85.1 kg (187 lb 9.8 oz) 29.38 kg/m2      Blood Pressure          Most Recent Value    BP  120/60      Allergies as of 3/24/2017     No Known Drug Allergies      Immunizations Administered on Date of Encounter - 3/24/2017     None      Orders Placed During " Today's Visit     Future Labs/Procedures Expected by Expires    CBC auto differential  3/24/2017 5/23/2018    PTH, intact  3/24/2017 5/23/2018    Renal function panel  3/24/2017 5/23/2018    Uric acid  3/24/2017 5/23/2018    Urinalysis  3/24/2017 3/24/2018    Vitamin D  3/24/2017 5/23/2018      Maintenance Dialysis History     Patient has no recorded history of maintenance dialysis.      Transplant Information        Txp Date Organ Coordinator Care Team    11/18/2014 Heart [facility] Referring Physician:  Placido Tobias MD   Corresponding Physician:  Placido Tobias MD   Surgeon:  Prem Nath MD         Instructions    Avoid NSAID pain medications such as advil, aleve, motrin, ibuprofen, naprosyn, meloxicam, diclofenac, mobic.     Fluid restriction 40 oz a day     Low salt diet        Language Assistance Services     ATTENTION: Language assistance services are available, free of charge. Please call [phone].      ATENCIÓN: Si habla español, tiene a hernandez disposición servicios gratuitos de asistencia lingüística. Llame al [phone].     CHÚ Ý: N?u b?n nói Ti?ng Vi?t, có các d?ch v? h? tr? ngôn ng? mi?n phí dành cho b?n. G?i s? [phone].         [facility] complies with applicable Federal civil rights laws and does not discriminate on the basis of race, color, national origin, age, disability, or sex.

## 2017-03-24 NOTE — PATIENT INSTRUCTIONS
Avoid NSAID pain medications such as advil, aleve, motrin, ibuprofen, naprosyn, meloxicam, diclofenac, mobic.     Fluid restriction 40 oz a day     Low salt diet

## 2017-03-29 ENCOUNTER — PATIENT MESSAGE (OUTPATIENT)
Dept: TRANSPLANT | Facility: CLINIC | Age: 42
End: 2017-03-29

## 2017-03-29 ENCOUNTER — PATIENT MESSAGE (OUTPATIENT)
Dept: ENDOCRINOLOGY | Facility: CLINIC | Age: 42
End: 2017-03-29

## 2017-03-31 ENCOUNTER — HOSPITAL ENCOUNTER (OUTPATIENT)
Facility: HOSPITAL | Age: 42
Discharge: HOME | End: 2017-03-31
Attending: RADIOLOGY | Admitting: RADIOLOGY
Payer: MEDICARE

## 2017-03-31 ENCOUNTER — SURGERY (OUTPATIENT)
Age: 42
End: 2017-03-31

## 2017-03-31 VITALS
BODY MASS INDEX: 29.35 KG/M2 | SYSTOLIC BLOOD PRESSURE: 115 MMHG | TEMPERATURE: 98 F | HEART RATE: 102 BPM | OXYGEN SATURATION: 95 % | HEIGHT: 67 IN | DIASTOLIC BLOOD PRESSURE: 63 MMHG | RESPIRATION RATE: 14 BRPM | WEIGHT: 187 LBS

## 2017-03-31 DIAGNOSIS — R18.8 ASCITES: ICD-10-CM

## 2017-03-31 LAB
ALBUMIN SERPL BCP-MCNC: 3.7 G/DL
ALP SERPL-CCNC: 216 U/L
ALT SERPL W/O P-5'-P-CCNC: 14 U/L
ANION GAP SERPL CALC-SCNC: 11 MMOL/L
APTT BLDCRRT: 26.7 SEC
AST SERPL-CCNC: 20 U/L
BASOPHILS # BLD AUTO: 0.02 K/UL
BASOPHILS NFR BLD: 0.4 %
BILIRUB SERPL-MCNC: 0.8 MG/DL
BUN SERPL-MCNC: 34 MG/DL
CALCIUM SERPL-MCNC: 9.5 MG/DL
CHLORIDE SERPL-SCNC: 103 MMOL/L
CO2 SERPL-SCNC: 25 MMOL/L
CREAT SERPL-MCNC: 2.2 MG/DL
DIFFERENTIAL METHOD: ABNORMAL
EOSINOPHIL # BLD AUTO: 0.1 K/UL
EOSINOPHIL NFR BLD: 1.2 %
ERYTHROCYTE [DISTWIDTH] IN BLOOD BY AUTOMATED COUNT: 13.6 %
EST. GFR  (AFRICAN AMERICAN): 41 ML/MIN/1.73 M^2
EST. GFR  (NON AFRICAN AMERICAN): 35 ML/MIN/1.73 M^2
GLUCOSE SERPL-MCNC: 242 MG/DL
HCT VFR BLD AUTO: 34.1 %
HGB BLD-MCNC: 11.3 G/DL
INR PPP: 1.1
LYMPHOCYTES # BLD AUTO: 0.7 K/UL
LYMPHOCYTES NFR BLD: 11.4 %
MCH RBC QN AUTO: 30.3 PG
MCHC RBC AUTO-ENTMCNC: 33.1 %
MCV RBC AUTO: 91 FL
MONOCYTES # BLD AUTO: 0.6 K/UL
MONOCYTES NFR BLD: 10 %
NEUTROPHILS # BLD AUTO: 4.4 K/UL
NEUTROPHILS NFR BLD: 77 %
PLATELET # BLD AUTO: 202 K/UL
PMV BLD AUTO: 10.3 FL
POTASSIUM SERPL-SCNC: 4.7 MMOL/L
PROT SERPL-MCNC: 6.8 G/DL
PROTHROMBIN TIME: 11.1 SEC
RBC # BLD AUTO: 3.73 M/UL
SODIUM SERPL-SCNC: 139 MMOL/L
WBC # BLD AUTO: 5.71 K/UL

## 2017-03-31 PROCEDURE — 63600175 PHARM REV CODE 636 W HCPCS

## 2017-03-31 PROCEDURE — C1769 GUIDE WIRE: HCPCS

## 2017-03-31 PROCEDURE — 85730 THROMBOPLASTIN TIME PARTIAL: CPT

## 2017-03-31 PROCEDURE — 80053 COMPREHEN METABOLIC PANEL: CPT

## 2017-03-31 PROCEDURE — 88307 TISSUE EXAM BY PATHOLOGIST: CPT | Performed by: PATHOLOGY

## 2017-03-31 PROCEDURE — 85610 PROTHROMBIN TIME: CPT

## 2017-03-31 PROCEDURE — 88313 SPECIAL STAINS GROUP 2: CPT | Mod: 26,,, | Performed by: PATHOLOGY

## 2017-03-31 PROCEDURE — C1894 INTRO/SHEATH, NON-LASER: HCPCS

## 2017-03-31 PROCEDURE — 85025 COMPLETE CBC W/AUTO DIFF WBC: CPT

## 2017-03-31 PROCEDURE — 88307 TISSUE EXAM BY PATHOLOGIST: CPT | Mod: 26,,, | Performed by: PATHOLOGY

## 2017-03-31 PROCEDURE — 25000003 PHARM REV CODE 250

## 2017-03-31 NOTE — DISCHARGE INSTRUCTIONS
TRANSJUGULAR LIVER BIOPSY  POST PROCEDURE INSTRUCTIONS    · ACTIVITY/ SAFETY: BECAUSE OF THE AFTER EFFECT OF THE SEDATION, WE ADVISE YOU TO REFRAIN FROM THE FOLLOWING ACTIVITIES FOR AT LEAST 12-16 HOURS:    A. DO NOT DRIVE A CAR OR OPERATE MACHINERY. YOUR  REFLEXES               AND COORDINATION ARE ALTERED.    B. HAVE STANDBY ASSISTANCE ON STAIRWAYS.    C. DO NOT RETURN TO WORK.    D. DO NOT OPERATE APPLIANCES IF ALONE (I.E.STOVE,IRON,LAWN                    MOWER)    E. DO NOT SIGN IMPORTANT PAPERS OR MAKE IMPORTANT DECISIONS.    F.  A RESPONSIBLE PERSON MUST STAY WITH THE PATIENT FOR 12             HOURS POST PROCEDURE.    G. YOU MAY SHOWER OR BATHE THE NEXT DAY.      · MEDICATIONS: 1.) RESUME TAKING YOUR USUAL MEDICINES AS SOON AS YOU START TO EAT. TAKE ONLY THE MEDICINES THAT YOUR DOCTORS PRESCRIBED OR APPROVED. 2.) THERE IS ONLY MINOR PAIN AFTER A TRANSJUGULAR LIVER BIOPSY. IF YOUR DOCTOR SAYS IT IS OK FOR YOU TO TAKE ACETAMINOPHEN (TYLENOL), THIS SHOULD EASE ANY DISCOMFORT YOU HAVE. IF YOUR DOCTOR EXPECTS YOU TO HAVE MORE SEVERE PAIN, YOU WILL RECEIVE A PRESCRIPTION FOR A STRONGER PAIN MEDICINE.    · WHEN TO CALL: CALL US RIGHT AWAY IF YOU HAVE : 1. BLEEDING IN YOUR NECK WHERE THE CATHETER WAS INSERTED. 2. ABDOMINAL PAIN.  3. DIZZINESS

## 2017-03-31 NOTE — PLAN OF CARE
1745 VSS  DIET TAKEN WITHOUT N/V RT NECK DRESSING DRY AND INTACT AMB IN UNIT CM AND PIV DC'D REDRESSED DISCHARGE INSTRUCTIONS GIVEN TO PT AND WIFE 1755 DISCHARGED PER W/C WITH BELONGINGS ACCOMPANIED BY THIS RN AND WIFE

## 2017-03-31 NOTE — INTERVAL H&P NOTE
The patient has been examined and the H&P has been reviewed:    I concur with the findings and no changes have occurred since H&P was written.    Anesthesia/Surgery risks, benefits and alternative options discussed and understood by patient/family.          Active Hospital Problems    Diagnosis  POA    Ascites [R18.8]  Yes     Priority: High      Resolved Hospital Problems    Diagnosis Date Resolved POA   No resolved problems to display.

## 2017-03-31 NOTE — IP AVS SNAPSHOT
[address] [place] [address]           Patient Discharge Instructions   Our goal is to set you up for success. This packet includes information on your condition, medications, and your home care.  It will help you care for yourself to prevent having to return to the hospital.     Please ask your nurse if you have any questions.      There are many details to remember when preparing to leave the hospital. Here is what you will need to do:    1. Take your medicine. If you are prescribed medications, review your Medication List on the following pages. You may have new medications to  at the pharmacy and others that you'll need to stop taking. Review the instructions for how and when to take your medications. Talk with your doctor or nurses if you are unsure of what to do.     2. Go to your follow-up appointments. Specific follow-up information is listed in the following pages. Your may be contacted by a nurse or clinical provider about future appointments. Be sure we have all of the phone numbers to reach you. Please contact your provider's office if you are unable to make an appointment.     3. Watch for warning signs. Your doctor or nurse will give you detailed warning signs to watch for and when to call for assistance. These instructions may also include educational information about your condition. If you experience any of warning signs to your health, call your doctor.               ** Verify the list of medication(s) below is accurate and up to date. Carry this with you in case of emergency. If your medications have changed, please notify your healthcare provider.             Medication List      CONTINUE taking these medications        Additional Info                      alendronate 70 MG tablet   Commonly known as:  FOSAMAX   Refills:  0      Begin Date    AM    Noon    PM    Bedtime       aspirin 81 MG EC tablet   Commonly known as:  ECOTRIN    Quantity:  30 tablet   Refills:  11   Dose:  81 mg    Instructions:  Take 1 tablet (81 mg total) by mouth once daily.     Begin Date    AM    Noon    PM    Bedtime       escitalopram oxalate 20 MG tablet   Commonly known as:  LEXAPRO   Quantity:  30 tablet   Refills:  11   Dose:  20 mg    Instructions:  Take 1 tablet (20 mg total) by mouth once daily.     Begin Date    AM    Noon    PM    Bedtime       gabapentin 100 MG capsule   Commonly known as:  NEURONTIN   Quantity:  360 capsule   Refills:  11   Dose:  300 mg    Instructions:  Take 3 capsules (300 mg total) by mouth 3 (three) times daily.     Begin Date    AM    Noon    PM    Bedtime       insulin detemir 100 unit/mL (3 mL) Inpn pen   Commonly known as:  LEVEMIR FLEXTOUCH   Quantity:  15 mL   Refills:  12   Dose:  20 Units    Instructions:  Inject 20 Units into the skin 2 (two) times daily.     Begin Date    AM    Noon    PM    Bedtime       insulin lispro 100 unit/mL injection   Commonly known as:  HUMALOG   Quantity:  15 mL   Refills:  12   Dose:  10 Units    Instructions:  Inject 10 Units into the skin 3 (three) times daily before meals.     Begin Date    AM    Noon    PM    Bedtime       lancets Misc   Quantity:  100 each   Refills:  5   Dose:  1 Device   Comments:  ON HOLD, patient will request fill once discharged from [facility]    Instructions:  1 Device by Misc.(Non-Drug; Combo Route) route 4 (four) times daily with meals and nightly.     Begin Date    AM    Noon    PM    Bedtime       levothyroxine 150 MCG tablet   Commonly known as:  SYNTHROID   Quantity:  30 tablet   Refills:  0    Instructions:  TAKE 1 TABLET BY MOUTH EVERY MORNING BEFORE BREAKFAST     Begin Date    AM    Noon    PM    Bedtime       magnesium oxide 400 mg tablet   Commonly known as:  MAG-OX   Quantity:  30 tablet   Refills:  11   Dose:  400 mg    Instructions:  Take 1 tablet (400 mg total) by mouth once daily.     Begin Date    AM    Noon    PM    Bedtime       mycophenolate 180 MG Tbec  "  Commonly known as:  MYFORTIC   Quantity:  240 tablet   Refills:  11   Dose:  720 mg    Instructions:  Take 4 tablets (720 mg total) by mouth 2 (two) times daily. S/P Heart Transplant 11/18/2014 ICD-10 Z94.1     Begin Date    AM    Noon    PM    Bedtime       nortriptyline 25 MG capsule   Commonly known as:  PAMELOR   Quantity:  30 capsule   Refills:  3   Dose:  25 mg    Instructions:  Take 1 capsule (25 mg total) by mouth every evening.     Begin Date    AM    Noon    PM    Bedtime       pantoprazole 40 MG tablet   Commonly known as:  PROTONIX   Quantity:  30 tablet   Refills:  11    Instructions:  TAKE ONE TABLET BY MOUTH EVERY DAY     Begin Date    AM    Noon    PM    Bedtime       pen needle, diabetic 32 gauge x 5/32" Ndle   Quantity:  200 each   Refills:  12   Dose:  1 Device    Instructions:  1 Device by Misc.(Non-Drug; Combo Route) route 5 (five) times daily.     Begin Date    AM    Noon    PM    Bedtime       pravastatin 40 MG tablet   Commonly known as:  PRAVACHOL   Quantity:  30 tablet   Refills:  11   Dose:  40 mg    Instructions:  Take 1 tablet (40 mg total) by mouth every evening.     Begin Date    AM    Noon    PM    Bedtime       predniSONE 2.5 MG tablet   Commonly known as:  DELTASONE   Quantity:  30 tablet   Refills:  11   Dose:  2.5 mg    Instructions:  Take 1 tablet (2.5 mg total) by mouth once daily. S/P Heart Transplant 11/18/2014 ICD-10 Z94.1     Begin Date    AM    Noon    PM    Bedtime       tacrolimus 1 MG Cap   Commonly known as:  PROGRAF   Quantity:  150 capsule   Refills:  11    Instructions:  Taked 2mg orally in the morning and 3mg orally in the evening.  S/p heart transplant  11/18/2014  ICD-10 Z94.1     Begin Date    AM    Noon    PM    Bedtime       tamsulosin 0.4 mg Cp24   Commonly known as:  FLOMAX   Quantity:  60 capsule   Refills:  11   Dose:  0.8 mg    Instructions:  Take 2 capsules (0.8 mg total) by mouth every evening.     Begin Date    AM    Noon    PM    Bedtime       " torsemide 20 MG Tab   Commonly known as:  DEMADEX   Quantity:  60 tablet   Refills:  11   Dose:  40 mg    Instructions:  Take 2 tablets (40 mg total) by mouth once daily.     Begin Date    AM    Noon    PM    Bedtime                  Please bring to all follow up appointments:    1. A copy of your discharge instructions.  2. All medicines you are currently taking in their original bottles.  3. Identification and insurance card.    Please arrive 15 minutes ahead of scheduled appointment time.    Please call 24 hours in advance if you must reschedule your appointment and/or time.        Your Scheduled Appointments     Apr 05, 2017  8:50 AM CDT   Fasting Lab with LAB, APPOINTMENT [place] [facility] ([facility])    [address] [facility] [address] [id number] [phone]            Apr 05, 2017 11:30 AM CDT   Established Patient with Monica Fernandez DNP, NP   [facility] Endocrinology ([facility] )    [address] [facility] [address] [facility] [address]   [phone]            Apr 05, 2017  2:00 PM CDT   Established Patient Visit with Rita Louise NP   [facility] ([facility] )    [address]  [place] [id number] [phone]            Apr 05, 2017  4:00 PM CDT   Color Flow Doppler Echo with ECHO, [facility] - Echo/Stress Lab ([facility] )    [address]  [place] [address]   [phone]            May 15, 2017 10:45 AM CDT   Non-Fasting Lab with LABORATORY, [place] [facility] ([facility])    [address] [place] [id number] [phone]              Follow-up Information     Follow up with Rukhsana Zuluaga MD In 1 month.    Specialty:  Gastroenterology    Why:  As needed    Contact information:    [address] [place] [address] [phone]            Discharge Instructions         TRANSJUGULAR LIVER BIOPSY  POST PROCEDURE  "INSTRUCTIONS    · ACTIVITY/ SAFETY: BECAUSE OF THE AFTER EFFECT OF THE SEDATION, WE ADVISE YOU TO REFRAIN FROM THE FOLLOWING ACTIVITIES FOR AT LEAST 12-16 HOURS:    A. DO NOT DRIVE A CAR OR OPERATE MACHINERY. YOUR  REFLEXES               AND COORDINATION ARE ALTERED.    B. HAVE STANDBY ASSISTANCE ON STAIRWAYS.    C. DO NOT RETURN TO WORK.    D. DO NOT OPERATE APPLIANCES IF ALONE (I.E.STOVE,IRON,LAWN                    MOWER)    E. DO NOT SIGN IMPORTANT PAPERS OR MAKE IMPORTANT DECISIONS.    F.  A RESPONSIBLE PERSON MUST STAY WITH THE PATIENT FOR 12             HOURS POST PROCEDURE.    G. YOU MAY SHOWER OR BATHE THE NEXT DAY.      · MEDICATIONS: 1.) RESUME TAKING YOUR USUAL MEDICINES AS SOON AS YOU START TO EAT. TAKE ONLY THE MEDICINES THAT YOUR DOCTORS PRESCRIBED OR APPROVED. 2.) THERE IS ONLY MINOR PAIN AFTER A TRANSJUGULAR LIVER BIOPSY. IF YOUR DOCTOR SAYS IT IS OK FOR YOU TO TAKE ACETAMINOPHEN (TYLENOL), THIS SHOULD EASE ANY DISCOMFORT YOU HAVE. IF YOUR DOCTOR EXPECTS YOU TO HAVE MORE SEVERE PAIN, YOU WILL RECEIVE A PRESCRIPTION FOR A STRONGER PAIN MEDICINE.    · WHEN TO CALL: CALL US RIGHT AWAY IF YOU HAVE : 1. BLEEDING IN YOUR NECK WHERE THE CATHETER WAS INSERTED. 2. ABDOMINAL PAIN.  3. DIZZINESS            Admission Information     Date & Time Provider Department CSN    3/31/2017 11:36 AM Heriberto Arce Jr., MD [facility] [id number]      Care Providers     Provider Role Specialty Primary office phone    Heriberto Arce Jr., MD Attending Provider Radiology [phone]    Heriberto Arce Jr., MD Surgeon  Radiology [phone]      Your Vitals Were     BP Pulse Temp Resp Height Weight    115/68 (BP Location: Right arm, Patient Position: Sitting, BP Method: Automatic) 105 98.4 °F (36.9 °C) (Oral) 11 5' 7" (1.702 m) 84.8 kg (187 lb)    SpO2 BMI             91% 29.29 kg/m2         Recent Lab Values        12/3/2015 2/26/2016 3/16/2016 7/6/2016 9/18/2016 10/4/2016 12/15/2016 1/23/2017      9:40 " AM 10:52 AM  8:55 AM  9:15 AM  4:30 PM  8:48 PM  4:30 AM  9:05 AM    A1C 7.9 (H) 8.2 (H) 8.2 (H) 7.4 (H) 7.0 (H) 7.0 (H) 7.4 (H) 7.1 (H)    Comment for A1C at  9:15 AM on 7/6/2016:  According to ADA guidelines, hemoglobin A1C <7.0% represents  optimal control in non-pregnant diabetic patients.  Different  metrics may apply to specific populations.   Standards of Medical Care in Diabetes - 2016.  For the purpose of screening for the presence of diabetes:  <5.7%     Consistent with the absence of diabetes  5.7-6.4%  Consistent with increasing risk for diabetes   (prediabetes)  >or=6.5%  Consistent with diabetes  Currently no consensus exists for use of hemoglobin A1C  for diagnosis of diabetes for children.      Comment for A1C at  4:30 PM on 9/18/2016:  According to ADA guidelines, hemoglobin A1C <7.0% represents  optimal control in non-pregnant diabetic patients.  Different  metrics may apply to specific populations.   Standards of Medical Care in Diabetes - 2016.  For the purpose of screening for the presence of diabetes:  <5.7%     Consistent with the absence of diabetes  5.7-6.4%  Consistent with increasing risk for diabetes   (prediabetes)  >or=6.5%  Consistent with diabetes  Currently no consensus exists for use of hemoglobin A1C  for diagnosis of diabetes for children.      Comment for A1C at  8:48 PM on 10/4/2016:  According to ADA guidelines, hemoglobin A1C <7.0% represents  optimal control in non-pregnant diabetic patients.  Different  metrics may apply to specific populations.   Standards of Medical Care in Diabetes - 2016.  For the purpose of screening for the presence of diabetes:  <5.7%     Consistent with the absence of diabetes  5.7-6.4%  Consistent with increasing risk for diabetes   (prediabetes)  >or=6.5%  Consistent with diabetes  Currently no consensus exists for use of hemoglobin A1C  for diagnosis of diabetes for children.      Comment for A1C at  4:30 AM on 12/15/2016:  According to ADA  guidelines, hemoglobin A1C <7.0% represents  optimal control in non-pregnant diabetic patients.  Different  metrics may apply to specific populations.   Standards of Medical Care in Diabetes - 2016.  For the purpose of screening for the presence of diabetes:  <5.7%     Consistent with the absence of diabetes  5.7-6.4%  Consistent with increasing risk for diabetes   (prediabetes)  >or=6.5%  Consistent with diabetes  Currently no consensus exists for use of hemoglobin A1C  for diagnosis of diabetes for children.      Comment for A1C at  9:05 AM on 1/23/2017:  According to ADA guidelines, hemoglobin A1C <7.0% represents  optimal control in non-pregnant diabetic patients.  Different  metrics may apply to specific populations.   Standards of Medical Care in Diabetes - 2016.  For the purpose of screening for the presence of diabetes:  <5.7%     Consistent with the absence of diabetes  5.7-6.4%  Consistent with increasing risk for diabetes   (prediabetes)  >or=6.5%  Consistent with diabetes  Currently no consensus exists for use of hemoglobin A1C  for diagnosis of diabetes for children.        Pending Labs     Order Current Status    Specimen to Pathology - Surgery Collected (03/31/17 1522)      Allergies as of 3/31/2017        Reactions    No Known Drug Allergies       [facility]     [facility] Care Line - 24/7 Assistance  Unless otherwise directed by your provider, please contact [facility], our nurse care line that is available for 24/7 assistance.     Registered nurses in the [facility] provide clinical advisement, health education, appointment booking, and other advisory services.  Call for this free service at [phone].        Advance Directives     An advance directive is a document which, in the event you are no longer able to make decisions for yourself, tells your healthcare team what kind of treatment you do or do not want to receive, or who you would like to make those  decisions for you.  If you do not currently have an advance directive, [facility] encourages you to create one.  For more information call:  [phone]), [phone] ([phone]),  or log on to [email or web address].        Language Assistance Services     ATTENTION: Language assistance services are available, free of charge. Please call [phone].      ATENCIÓN: Si habla yunañol, tiene a hernandez disposición servicios gratuitos de asistencia lingüística. Llame al [phone].     [facility] Ý: N?u b?n nói Ti?ng Vi?t, có các d?ch v? h? tr? ngôn ng? mi?n phí dành cho b?n. G?i s? [phone].        Chronic Kindey Disease Education             Diabetes Discharge Instructions                                    [facility] complies with applicable Federal civil rights laws and does not discriminate on the basis of race, color, national origin, age, disability, or sex.

## 2017-04-01 ENCOUNTER — TELEPHONE (OUTPATIENT)
Dept: TRANSPLANT | Facility: CLINIC | Age: 42
End: 2017-04-01

## 2017-04-01 NOTE — TELEPHONE ENCOUNTER
Pt's wife called and asked if we could f/u on Heart and fluid, Pt has appointment on Wednesday 4/5/17.

## 2017-04-04 ENCOUNTER — PATIENT MESSAGE (OUTPATIENT)
Dept: TRANSPLANT | Facility: CLINIC | Age: 42
End: 2017-04-04

## 2017-04-05 ENCOUNTER — HOSPITAL ENCOUNTER (OUTPATIENT)
Dept: CARDIOLOGY | Facility: CLINIC | Age: 42
Discharge: HOME | End: 2017-04-05
Payer: MEDICARE

## 2017-04-05 ENCOUNTER — OFFICE VISIT (OUTPATIENT)
Dept: TRANSPLANT | Facility: CLINIC | Age: 42
End: 2017-04-05
Payer: MEDICARE

## 2017-04-05 ENCOUNTER — PATIENT MESSAGE (OUTPATIENT)
Dept: TRANSPLANT | Facility: CLINIC | Age: 42
End: 2017-04-05

## 2017-04-05 ENCOUNTER — OFFICE VISIT (OUTPATIENT)
Dept: ENDOCRINOLOGY | Facility: CLINIC | Age: 42
End: 2017-04-05
Payer: MEDICARE

## 2017-04-05 ENCOUNTER — RESEARCH ENCOUNTER (OUTPATIENT)
Dept: RESEARCH | Facility: HOSPITAL | Age: 42
End: 2017-04-05

## 2017-04-05 VITALS
WEIGHT: 187 LBS | SYSTOLIC BLOOD PRESSURE: 102 MMHG | HEIGHT: 67 IN | DIASTOLIC BLOOD PRESSURE: 62 MMHG | HEART RATE: 112 BPM | BODY MASS INDEX: 29.35 KG/M2

## 2017-04-05 VITALS — SYSTOLIC BLOOD PRESSURE: 102 MMHG | DIASTOLIC BLOOD PRESSURE: 70 MMHG

## 2017-04-05 DIAGNOSIS — Z94.1 STATUS POST HEART TRANSPLANT: ICD-10-CM

## 2017-04-05 DIAGNOSIS — T86.20 COMPLICATION OF HEART TRANSPLANT, UNSPECIFIED COMPLICATION: ICD-10-CM

## 2017-04-05 DIAGNOSIS — Z94.1 HEART TRANSPLANTED: ICD-10-CM

## 2017-04-05 DIAGNOSIS — I25.10 CORONARY ARTERY DISEASE INVOLVING NATIVE CORONARY ARTERY WITHOUT ANGINA PECTORIS, UNSPECIFIED WHETHER NATIVE OR TRANSPLANTED HEART: ICD-10-CM

## 2017-04-05 DIAGNOSIS — R18.8 OTHER ASCITES: ICD-10-CM

## 2017-04-05 DIAGNOSIS — E10.65 TYPE 1 DIABETES MELLITUS WITH HYPERGLYCEMIA: Chronic | ICD-10-CM

## 2017-04-05 DIAGNOSIS — D84.9 IMMUNOSUPPRESSION: ICD-10-CM

## 2017-04-05 DIAGNOSIS — N18.30 TYPE 1 DIABETES MELLITUS WITH STAGE 3 CHRONIC KIDNEY DISEASE: Primary | Chronic | ICD-10-CM

## 2017-04-05 DIAGNOSIS — E03.8 HYPOTHYROIDISM DUE TO HASHIMOTO'S THYROIDITIS: Chronic | ICD-10-CM

## 2017-04-05 DIAGNOSIS — I10 ESSENTIAL HYPERTENSION: ICD-10-CM

## 2017-04-05 DIAGNOSIS — Z79.899 ENCOUNTER FOR LONG-TERM (CURRENT) USE OF MEDICATIONS: ICD-10-CM

## 2017-04-05 DIAGNOSIS — Z94.1 HEART TRANSPLANTED: Primary | ICD-10-CM

## 2017-04-05 DIAGNOSIS — K76.9 LIVER DISEASE: ICD-10-CM

## 2017-04-05 DIAGNOSIS — E10.22 TYPE 1 DIABETES MELLITUS WITH STAGE 3 CHRONIC KIDNEY DISEASE: Primary | Chronic | ICD-10-CM

## 2017-04-05 DIAGNOSIS — Z79.52 LONG TERM CURRENT USE OF SYSTEMIC STEROIDS: ICD-10-CM

## 2017-04-05 DIAGNOSIS — E06.3 HYPOTHYROIDISM DUE TO HASHIMOTO'S THYROIDITIS: Chronic | ICD-10-CM

## 2017-04-05 DIAGNOSIS — I73.9 PVD (PERIPHERAL VASCULAR DISEASE): ICD-10-CM

## 2017-04-05 DIAGNOSIS — E78.5 HYPERLIPIDEMIA LDL GOAL <70: ICD-10-CM

## 2017-04-05 DIAGNOSIS — N28.9 RENAL INSUFFICIENCY: ICD-10-CM

## 2017-04-05 DIAGNOSIS — R00.0 TACHYCARDIA: ICD-10-CM

## 2017-04-05 LAB
DIASTOLIC DYSFUNCTION: NO
ESTIMATED PA SYSTOLIC PRESSURE: 29.16
MITRAL VALVE REGURGITATION: NORMAL
RETIRED EF AND QEF - SEE NOTES: 55 (ref 55–65)
TRICUSPID VALVE REGURGITATION: NORMAL

## 2017-04-05 PROCEDURE — 93306 TTE W/DOPPLER COMPLETE: CPT | Mod: S$GLB,,, | Performed by: INTERNAL MEDICINE

## 2017-04-05 PROCEDURE — 99999 PR PBB SHADOW E&M-EST. PATIENT-LVL II: CPT | Mod: PBBFAC,,, | Performed by: INTERNAL MEDICINE

## 2017-04-05 PROCEDURE — 99214 OFFICE O/P EST MOD 30 MIN: CPT | Mod: S$GLB,,, | Performed by: NURSE PRACTITIONER

## 2017-04-05 PROCEDURE — 99215 OFFICE O/P EST HI 40 MIN: CPT | Mod: S$GLB,,, | Performed by: INTERNAL MEDICINE

## 2017-04-05 PROCEDURE — 99999 PR PBB SHADOW E&M-EST. PATIENT-LVL III: CPT | Mod: PBBFAC,,, | Performed by: NURSE PRACTITIONER

## 2017-04-05 RX ORDER — INSULIN LISPRO 100 [IU]/ML
INJECTION, SOLUTION INTRAVENOUS; SUBCUTANEOUS
Qty: 15 ML | Refills: 12 | Status: SHIPPED | OUTPATIENT
Start: 2017-04-05 | End: 2017-01-01

## 2017-04-05 RX ORDER — LEVOTHYROXINE SODIUM 150 UG/1
150 TABLET ORAL EVERY MORNING
Qty: 30 TABLET | Refills: 12 | Status: SHIPPED | OUTPATIENT
Start: 2017-04-05 | End: 2017-04-26 | Stop reason: SDUPTHER

## 2017-04-05 RX ORDER — PEN NEEDLE, DIABETIC 30 GX3/16"
NEEDLE, DISPOSABLE MISCELLANEOUS
Qty: 200 EACH | Refills: 12 | Status: ON HOLD | OUTPATIENT
Start: 2017-04-05 | End: 2018-01-01

## 2017-04-05 NOTE — PROGRESS NOTES
CC: This 41 y.o. male presents for management of diamete mellitus type 1  along with the current chronic medical conditions including: heart tx, HTN, HLD, hypothyroidism    HPI:  Mr. Crocker was diagnosed with type 1 diabetes at the age of 6 years. He was started on an Omni Pod insulin pump in 2012 in [place] - his care was transferred here when he became a heart transplant candidate due to severe ischemic cardiomyopathy.     Mr Crocker is s/p heart transplant, 2014, complicated hospital course.  .       Checks bg 4 times a day. He presents to clinic today with no log or bg meter- No exercise.  Reports:  Fastin  Lunch: 130-140  Dinner: 115   Bedtime: 165      Continue with ascites - newly diagnosed with cirrhosis    Eating approx 2 meals a day- late lunch dinner  Eating [employer] of late  No hypoglycemia  No exercise    CURRENT DIABETES MEDICATIONS: NPH 20 unit(s) bid, regular 10 unit(s) AC  Standards of Care:  Eye exam: 2016-  No retinopathy    ROS:   Gen: Appetite fair, weight stable, +fatigue and weakness.  Skin: Skin is intact and heals well, no rashes, + easy bruising   Eyes: Denies visual disturbances  Resp:  No AGUILAR, no cough  Cardiac: no palpitations, BLE edema or cyanosis.  GI: no nausea,  no vomiting, diarrhea, no constipation, or abdominal pain.  /GYN: No nocturia, no urinary frequency, burning or pain.   PVD: denies leg pain with or without exercise   MS/Neuro: Denies numbness/ tingling in BLE; Speech clear,   Psych: Denies drug/ETOH abuse, no hx. of eating disorders or depression.  Other systems: negative.    PE:  GENERAL: Well developed, well nourished.  PSYCH: AAOx3, appropriate mood and affect, pleasant expression, conversant, appears relaxed, well groomed.   EYES: Conjunctiva, corneas clear, no lid lag, EOM intact.  NECK: Supple, trachea midline,    CHEST: Resp even and unlabored, CTA bilateral.  CARDIAC: RRR, S1, S2 heard.   ABDOMEN: Soft, non-tender,  non-distended;    VASCULAR: DP pulses +2/4 bilaterally, no BLE edema   SKIN: Normal skin turgor. Skin warm and dry. no acanthosis nigracans.  FOOT EXAMINATION: 4/5/17  No foot deformity, corns or callus formation,  nails in good condiiton and well trimmed, no interspace maceration or ulceration noted.  Decreased hair growth present over toes/feet.  Protective sensation intact with 10 gram monofilament.  +2 dorsalis pedis and posterior pulses noted.    BONE MINERAL DENSITY RESULTS: 12/3/15  Lumbar Spine: Lumbar bone mineral density L1-L4 is 0.849g/cm2, which is a t-score of -2.2. The z-score is -2.0.  Total Hip: The total hip bone mineral density is 0.724g/cm2. The t-score is -2.0, and the z-score is -1.8. Femoral neck BMD is 0.620g/cm2 and the t-score is -2.3.    Hemoglobin A1C   Date Value Ref Range Status   01/23/2017 7.1 (H) 4.5 - 6.2 % Final     Comment:     According to ADA guidelines, hemoglobin A1C <7.0% represents  optimal control in non-pregnant diabetic patients.  Different  metrics may apply to specific populations.   Standards of Medical Care in Diabetes - 2016.  For the purpose of screening for the presence of diabetes:  <5.7%     Consistent with the absence of diabetes  5.7-6.4%  Consistent with increasing risk for diabetes   (prediabetes)  >or=6.5%  Consistent with diabetes  Currently no consensus exists for use of hemoglobin A1C  for diagnosis of diabetes for children.     12/15/2016 7.4 (H) 4.5 - 6.2 % Final     Comment:     According to ADA guidelines, hemoglobin A1C <7.0% represents  optimal control in non-pregnant diabetic patients.  Different  metrics may apply to specific populations.   Standards of Medical Care in Diabetes - 2016.  For the purpose of screening for the presence of diabetes:  <5.7%     Consistent with the absence of diabetes  5.7-6.4%  Consistent with increasing risk for diabetes   (prediabetes)  >or=6.5%  Consistent with diabetes  Currently no consensus exists for use of  hemoglobin A1C  for diagnosis of diabetes for children.     10/04/2016 7.0 (H) 4.5 - 6.2 % Final     Comment:     According to ADA guidelines, hemoglobin A1C <7.0% represents  optimal control in non-pregnant diabetic patients.  Different  metrics may apply to specific populations.   Standards of Medical Care in Diabetes - 2016.  For the purpose of screening for the presence of diabetes:  <5.7%     Consistent with the absence of diabetes  5.7-6.4%  Consistent with increasing risk for diabetes   (prediabetes)  >or=6.5%  Consistent with diabetes  Currently no consensus exists for use of hemoglobin A1C  for diagnosis of diabetes for children.       ASSESSMENT and PLAN:    1- Type 1 diabetes with circ. and neuro complications  -- d/c NPH and Regular  levemir 14 units bid and Novolog 8 units AC   check bg ac/hs  Log in 2 weeks or sooner for issues  Coordinate HTS and my appt in 3-4 months  Added A1C to lab today, likely falsely low r/t anemia     2. H/o Severe ischemic cardiomyopathy/CHF/CAD s/p heart tx-  Followed by HTS     3. Hypothyroidism - Continue LT4 150 mcg qd-  taking alone w water 30 min prior to other meals- historically his TSH becomes elevated when he is fluid overloaded    Lab Results   Component Value Date    TSH 0.769 01/23/2017     4. HTN - controlled per HTS     5. Hyperlipidemia -  on statin- managed by HTS-      6. Steroid therapy and Immunosuppression- 2.5 mg prednisone qd, prograf managed per HTS    7. Osteopenia- BMD 12/2015-  Repeat w RTC, Fosamax per HTS. Monitor Vitamin d level- 48 in July 2016- repeat annually    8. CKD 3- avoid hypo Estimated Creatinine Clearance: 41.3 mL/min (based on Cr of 2.4).    9. Cirrhosis: following w Dr Zuluaga in [place]

## 2017-04-05 NOTE — PROGRESS NOTES
Date:    5 April 2017  Study:   [facility] T1D Exchange Registry  IRB#:    [id number]    : Gurwinder Adams MD  Site Number:        [id number]  Subject Number:   [id number]      Consent  The informed consent form was discussed extensively and ample time was provided for questions and answers, emphasis was placed on the HIPPA, voluntary participation, and confidentiality language. The IRB was discussed with the subject and IRB contact information was reviewed and highlighted. Odalis Sandoval MD was available to discuss the study indications, procedure(s), alternative treatments, anticipated risks, expected/anticipated benefits, and to answer any related questions. The subject was given 15 minutes to independently read and review the informed consent form. The subject expressed a clear understanding of the risks, benefits, indications, and alternatives to the investigational study and the associated procedure(s). All questions were answered to the subjects liking prior to obtaining informed consent. The subject was provided with a copy of the signed informed consent form and study staff contact information. No study related procedures were performed before the informed consent was signed.     Enrollment  The coordinator reviewed the subjects chart to confirm the diagnosis of Type 1 Diabetes Mellitus (T1D) with an elevated A1C (HbA1c ? 6.5%). The subject confirmed his age of T1D diagnosis (5yo), date of birth (17 JUL 1975), and that insulin was required at diagnosis and continually thereafter. The coordinator then entered all required data into the [facility] T1D Exchange website to obtain the subjects access code. Finally, the subject was given the sponsor provided [employer] to complete the subject questionnaire via the [facility] T1D Exchange Registry website.     After completing the questionnaire, the subject confirmed that he entered his personal email address to receive the complementary gift for participation. No other  action is required.    Plan  The coordinator will review the subjects chart annually, as per the study protocol.

## 2017-04-05 NOTE — LETTER
April 5, 2017        Placido Tobias  [address] [address]  Phone: [phone]  Fax: [phone]             [facility] [address] [place] [address]  Phone: [phone]   Patient: Lv Crocker   MR Number: [id number]   YOB: 1975   Date of Visit: 4/5/2017       Dear Dr. Placido Tobias    Thank you for referring Lv Crocker to me for evaluation. Attached you will find relevant portions of my assessment and plan of care.    If you have questions, please do not hesitate to call me. I look forward to following Lv Crocker along with you.    Sincerely,    Alexandr Hernández MD    Enclosure    If you would like to receive this communication electronically, please contact [email or web address] or [phone] to request [employer] access.    [employer] is a tool which provides read-only access to select patient information with whom you have a relationship. Its easy to use and provides real time access to review your patients record including encounter summaries, notes, results, and demographic information.    If you feel you have received this communication in error or would no longer like to receive these types of communications, please e-mail [email or web address]

## 2017-04-05 NOTE — PROGRESS NOTES
Subjective:   Mr. Crocker is a 41 y.o. year old White male who received a  - brain death heart transplant on 14.      CMV status:   Donor: -  Recipient: +    HPI  Lv Crocker is a very pleasant  40 yo s/p OHTX , complicated by AMR, treated 04/15 with 5 days PP, IVIG x 2 doses, was scheduled for Rituximab on 5/1/15 but developed pancytopenia in 2015 who comes in for a clinic visit.     Recent angiogram with IVUS showed no evidence of CAV. Comes today for a follow-up visit- was admitted - for volume overload- He did not diurese well initially and his renal function worsened. IR was consulted and paracentesis was performed in which >4L was removed. His renal function improved slightly after paracentesis and he felt much better. Endo was also following secondary to hyperglycemia. He was on an insulin gtt and eventually transitioned to Novolog/Levemir. His renal function plateaued(Cr 2.8) and he was discharged home on Torsemide 40mg daily.      Here for follow up. Problem is ascites and liver disease awaiting liver biopsy    Current immunosuppression: pred 2.5 mg daily, myfortic 720mg bid, prograf 2mg/3mg    TTE today   1 - Post-cardiac transplantation study.     2 - Normal left ventricular systolic function (EF 55-60%).     3 - Low normal right ventricular systolic function .     4 - Trivial to mild tricuspid regurgitation.     5 - Trivial to mild pulmonic regurgitation.     6 - Trivial pericardial effusion.     7 - The estimated PA systolic pressure is 37 mmHg.     RHC/Bx 16  Grade 0 cellular and pAMR  RHC:  AOSAT: 97  FICKCI: 2.92  FICKCO: 5.75  PAPRES:  (30)  PASAT: 61  PVR: 0.7  PWPRES:  (26)  RAPRES:  (18)  RVPRES: 39/18, 18:  PWSAT: 98    Immunosupression: tacro 4mg bid  Myfortic 360mg bid, pred 2.5     Review of Systems   Constitution: Negative. Negative for chills, decreased appetite, diaphoresis, fever, weakness, malaise/fatigue, night sweats, weight  gain and weight loss.   HENT: Negative.    Eyes: Negative.    Cardiovascular: Negative for chest pain, claudication, cyanosis, dyspnea on exertion, irregular heartbeat, leg swelling, near-syncope, orthopnea, palpitations, paroxysmal nocturnal dyspnea and syncope.   Respiratory: Negative for cough, hemoptysis and shortness of breath.    Endocrine: Negative.    Hematologic/Lymphatic: Negative.    Skin: Negative.  Negative for color change, dry skin and nail changes.   Musculoskeletal: Negative.    Gastrointestinal: Positive for bloating. Negative for abdominal pain and change in bowel habit.   Genitourinary: Negative.    Neurological: Negative for dizziness and light-headedness.   Psychiatric/Behavioral: Negative for depression.       Objective:   There were no vitals taken for this visit.body mass index is unknown because there is no height or weight on file.    Physical Exam   Constitutional: He appears well-developed.        HENT:   Head: Normocephalic.   Neck: No JVD present. Carotid bruit is not present.   Cardiovascular: Regular rhythm, normal heart sounds and normal pulses.  Tachycardia present.  PMI is not displaced.  Exam reveals no gallop and no S3.    No murmur heard.  tachy   Pulmonary/Chest: Effort normal and breath sounds normal. No respiratory distress. He has no wheezes.   Abdominal: Soft. Bowel sounds are normal. He exhibits distension and ascites. There is no tenderness. There is no rebound.   Musculoskeletal: He exhibits no edema.   Neurological: He is alert.   Skin: Skin is warm.   Vitals reviewed.      Chemistry        Component Value Date/Time     04/05/2017 0853    K 4.0 04/05/2017 0853    CL 98 04/05/2017 0853    CO2 28 04/05/2017 0853    BUN 34 (H) 04/05/2017 0853    CREATININE 2.4 (H) 04/05/2017 0853     (H) 04/05/2017 0853        Component Value Date/Time    CALCIUM 9.8 04/05/2017 0853    ALKPHOS 262 (H) 04/05/2017 0853    AST 28 04/05/2017 0853    ALT 17 04/05/2017 0853     BILITOT 0.8 04/05/2017 0853            Magnesium   Date Value Ref Range Status   04/05/2017 1.6 1.6 - 2.6 mg/dL Final       Lab Results   Component Value Date    WBC 5.24 04/05/2017    HGB 11.8 (L) 04/05/2017    HCT 35.4 (L) 04/05/2017    MCV 89 04/05/2017     04/05/2017       Lab Results   Component Value Date    INR 1.1 03/31/2017    INR 1.1 02/22/2017    INR 1.0 01/16/2017       BNP   Date Value Ref Range Status   04/05/2017 192 (H) 0 - 99 pg/mL Final     Comment:     Values of less than 100 pg/ml are consistent with non-CHF populations.   01/23/2017 151 (H) 0 - 99 pg/mL Final     Comment:     Values of less than 100 pg/ml are consistent with non-CHF populations.   01/16/2017 186 (H) 0 - 99 pg/mL Final     Comment:     Values of less than 100 pg/ml are consistent with non-CHF populations.       LD   Date Value Ref Range Status   04/22/2015 397 (H) 110 - 260 U/L Final     Comment:     Results are increased in hemolyzed samples.   12/06/2012 211 110 - 260 U/L Final     Comment:     Results are increased in hemolyzed samples.             Tacrolimus Lvl   Date Value Ref Range Status   04/05/2017 7.9 5.0 - 15.0 ng/mL Final     Comment:     Testing performed by Liquid Chromatography-Tandem  Mass Spectrometry (LC-MS/MS).  This test was developed and its performance characteristics  determined by [facility], Department of Pathology  and Laboratory Medicine in a manner consistent with CLIA  requirements. It has not been cleared or approved by the [place]  Food and Drug Administration.  This test is used for clinical   purposes.  It should not be regarded as investigational or for  research.       No results found for: SIROLIMUS  No results found for: CYCLOSPORINE    No results found for this or any previous visit.  No results found for this or any previous visit.    Labs were reviewed with the patient.    Assessment:     1. Heart transplanted- echo today actually looks pretty good- EF 50-55%, with normal  diastolic fxn- has evidence of pleural and pericardial effusions which are stable in size   2. Heart transplant rejection    3. Essential hypertension    4. 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 Ischemic cardiomyopathy    6. Hyperlipidemia    7. Coronary artery disease involving native coronary artery without angina pectoris, unspecified whether native or transplanted heart    8. Chronic kidney disease (CKD), stage III (moderate)    9. 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 Immunosuppression        Plan:   Continue current regimen- will set up outpt paracentesis and reorder labs as there was a scheduling mixup    Awaiting liver biopsy. Reassess  Return instructions as set forth by post transplant schedule or as needed:    Clinic: Return for labs and/or biopsy weekly the first month, every two weeks during month 2 and then monthly for the first year at the provider or coordinator's discretion. During the second year, return to clinic every 3 months. Post transplant year 3-5 return every 6 months. There will be a comprehensive post transplant evaluation every year that may include LHC/RHC/biopsy, stress test, echo, CXR, and other health screening exams.    In addition to the clinical assessment, I have ordered Allomap testing for this patient to assist in identification of moderate/severe acute cellular rejection (ACR) in a pt with stable Allograft function instead of endomyocardial biopsy.     Patient is reminded to call with any health changes as these can be early signs of transplant complications. Patient is advised to make sure any new medications or changes of old medications are discussed with a pharmacist or physician knowledgeable with transplant to avoid rejection/drug toxicity related to significant drug interactions.    UNOS Patient Status  Functional Status: 100% - Normal, no complaints, no evidence of disease  Physical Capacity: No  Limitations  Working for Income: Unknown    Alexandr Hernández MD

## 2017-04-07 ENCOUNTER — TELEPHONE (OUTPATIENT)
Dept: GASTROENTEROLOGY | Facility: CLINIC | Age: 42
End: 2017-04-07

## 2017-04-07 NOTE — TELEPHONE ENCOUNTER
----- Message from Annetta Rushing sent at 4/7/2017  2:16 PM CDT -----  Contact: Elizabeth - wife  She would like for you to call her at [phone]. bah

## 2017-04-10 ENCOUNTER — PATIENT MESSAGE (OUTPATIENT)
Dept: GASTROENTEROLOGY | Facility: CLINIC | Age: 42
End: 2017-04-10

## 2017-04-11 ENCOUNTER — PATIENT MESSAGE (OUTPATIENT)
Dept: TRANSPLANT | Facility: CLINIC | Age: 42
End: 2017-04-11

## 2017-04-11 RX ORDER — PRAVASTATIN SODIUM 40 MG/1
40 TABLET ORAL NIGHTLY
Qty: 30 TABLET | Refills: 11 | Status: ON HOLD | OUTPATIENT
Start: 2017-04-11 | End: 2018-01-01

## 2017-04-12 ENCOUNTER — OFFICE VISIT (OUTPATIENT)
Dept: GASTROENTEROLOGY | Facility: CLINIC | Age: 42
End: 2017-04-12
Payer: MEDICARE

## 2017-04-12 VITALS
WEIGHT: 178.56 LBS | DIASTOLIC BLOOD PRESSURE: 68 MMHG | SYSTOLIC BLOOD PRESSURE: 98 MMHG | HEIGHT: 67 IN | BODY MASS INDEX: 28.02 KG/M2

## 2017-04-12 DIAGNOSIS — D64.9 ANEMIA, UNSPECIFIED TYPE: ICD-10-CM

## 2017-04-12 DIAGNOSIS — K76.1 HEPATIC CONGESTION: ICD-10-CM

## 2017-04-12 DIAGNOSIS — R18.8 OTHER ASCITES: Primary | ICD-10-CM

## 2017-04-12 PROCEDURE — 99999 PR PBB SHADOW E&M-EST. PATIENT-LVL II: CPT | Mod: PBBFAC,,, | Performed by: INTERNAL MEDICINE

## 2017-04-12 PROCEDURE — 99213 OFFICE O/P EST LOW 20 MIN: CPT | Mod: S$GLB,,, | Performed by: INTERNAL MEDICINE

## 2017-04-12 NOTE — PROGRESS NOTES
Subjective:     Lv Crocker is here for follow up of Liver bx results      HPI  Since Lv Crocker's last visit he had a paracentesis that was not consistent with portal hypertension causing his ascites therefore he had a transjugular liver biopsy.  Transjug showed elevated corrected sinusoidal pressure but also significant RA pressure.    He also had upper endoscopy since last visit without any significant cause for his anemia.    Overall he reports that his weight has been trending down.  Denies any acute issues.  No overt GI bleeding.    His anemia has also been improving.    Review of Systems    Objective:     Physical Exam    3/2017 bx  FINAL PATHOLOGIC DIAGNOSIS  Liver, random, biopsy:  - Mild sinusoidal dilatation and focal bile ductular reaction.  - No significant fibrosis.  - Mild siderosis, 1+, within Kupffer cells.      MELD-Na score: 15 at 3/31/2017 12:41 PM  MELD score: 15 at 3/31/2017 12:41 PM  Calculated from:  Serum Creatinine: 2.2 mg/dL at 3/31/2017 12:41 PM  Serum Sodium: 139 mmol/L (Rounded to 137) at 3/31/2017 12:41 PM  Total Bilirubin: 0.8 mg/dL (Rounded to 1) at 3/31/2017 12:41 PM  INR(ratio): 1.1 at 3/31/2017 12:41 PM  Age: 41 years    WBC   Date Value Ref Range Status   04/05/2017 5.24 3.90 - 12.70 K/uL Final     Hemoglobin   Date Value Ref Range Status   04/05/2017 11.8 (L) 14.0 - 18.0 g/dL Final     POC Hematocrit   Date Value Ref Range Status   11/21/2014 24 (L) 36 - 54 %PCV Final     Hematocrit   Date Value Ref Range Status   04/05/2017 35.4 (L) 40.0 - 54.0 % Final     Platelets   Date Value Ref Range Status   04/05/2017 229 150 - 350 K/uL Final     BUN, Bld   Date Value Ref Range Status   04/05/2017 34 (H) 6 - 20 mg/dL Final     Creatinine   Date Value Ref Range Status   04/05/2017 2.4 (H) 0.5 - 1.4 mg/dL Final     Glucose   Date Value Ref Range Status   04/05/2017 135 (H) 70 - 110 mg/dL Final     Calcium   Date Value Ref Range Status   04/05/2017 9.8 8.7 - 10.5  mg/dL Final     Sodium   Date Value Ref Range Status   04/05/2017 140 136 - 145 mmol/L Final     Potassium   Date Value Ref Range Status   04/05/2017 4.0 3.5 - 5.1 mmol/L Final     Chloride   Date Value Ref Range Status   04/05/2017 98 95 - 110 mmol/L Final     Magnesium   Date Value Ref Range Status   04/05/2017 1.6 1.6 - 2.6 mg/dL Final     AST   Date Value Ref Range Status   04/05/2017 28 10 - 40 U/L Final     ALT   Date Value Ref Range Status   04/05/2017 17 10 - 44 U/L Final     Alkaline Phosphatase   Date Value Ref Range Status   04/05/2017 262 (H) 55 - 135 U/L Final     Total Bilirubin   Date Value Ref Range Status   04/05/2017 0.8 0.1 - 1.0 mg/dL Final     Comment:     For infants and newborns, interpretation of results should be based  on gestational age, weight and in agreement with clinical  observations.  Premature Infant recommended reference ranges:  Up to 24 hours.............<8.0 mg/dL  Up to 48 hours............<12.0 mg/dL  3-5 days..................<15.0 mg/dL  6-29 days.................<15.0 mg/dL       Albumin   Date Value Ref Range Status   04/05/2017 3.9 3.5 - 5.2 g/dL Final     INR   Date Value Ref Range Status   03/31/2017 1.1 0.8 - 1.2 Final     Comment:     Coumadin Therapy:  2.0 - 3.0 for INR for all indicators except mechanical heart valves  and antiphospholipid syndromes which should use 2.5 - 3.5.           Assessment/Plan:     1. Other ascites    2. Anemia, unspecified type    3. Hepatic congestion      Lv Crocker is a 41 y.o. 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  This does not seem to be related to cirrhosis and portal hypertension.  Suspect this is related to his heart.  -No further GI follow-up needed  -Continue with cardiology for monitoring and paracentesis as needed.  On next paracentesis could also consider sending cytology.    Anemia, unspecified type-anemia seems to be improving without any overt blood loss.  Suspect that his  anemia may be multifactorial.  A component may be related to renal insufficiency.  No further GI workup recommended at this time  -If anemia worsens would consider capsule endoscopy    Hepatic congestion-biopsy mainly showed evidence of mild passive congestion without significant fibrosis      The above results and plan discussed with patient and wife.  Return to GI clinic as needed    Rukhsana Zuluaga MD

## 2017-04-25 ENCOUNTER — PATIENT MESSAGE (OUTPATIENT)
Dept: TRANSPLANT | Facility: CLINIC | Age: 42
End: 2017-04-25

## 2017-04-25 RX ORDER — LEVOTHYROXINE SODIUM 150 UG/1
TABLET ORAL
Qty: 30 TABLET | Refills: 0 | Status: SHIPPED | OUTPATIENT
Start: 2017-04-25 | End: 2017-05-31

## 2017-04-26 DIAGNOSIS — E03.9 ACQUIRED HYPOTHYROIDISM: ICD-10-CM

## 2017-04-26 DIAGNOSIS — Z94.1 STATUS POST HEART TRANSPLANT: Primary | ICD-10-CM

## 2017-04-26 RX ORDER — GABAPENTIN 100 MG/1
300 CAPSULE ORAL 3 TIMES DAILY
Qty: 360 CAPSULE | Refills: 11
Start: 2017-04-26 | End: 2017-04-26 | Stop reason: SDUPTHER

## 2017-04-26 RX ORDER — LEVOTHYROXINE SODIUM 150 UG/1
150 TABLET ORAL EVERY MORNING
Qty: 30 TABLET | Refills: 12 | Status: SHIPPED | OUTPATIENT
Start: 2017-04-26 | End: 2017-04-26 | Stop reason: SDUPTHER

## 2017-04-26 RX ORDER — NORTRIPTYLINE HYDROCHLORIDE 25 MG/1
25 CAPSULE ORAL NIGHTLY
Qty: 30 CAPSULE | Refills: 3 | Status: SHIPPED | OUTPATIENT
Start: 2017-04-26 | End: 2017-04-26 | Stop reason: SDUPTHER

## 2017-04-27 ENCOUNTER — OFFICE VISIT (OUTPATIENT)
Dept: URGENT CARE | Facility: CLINIC | Age: 42
End: 2017-04-27
Payer: MEDICARE

## 2017-04-27 ENCOUNTER — NURSE TRIAGE (OUTPATIENT)
Dept: ADMINISTRATIVE | Facility: CLINIC | Age: 42
End: 2017-04-27

## 2017-04-27 VITALS
DIASTOLIC BLOOD PRESSURE: 71 MMHG | WEIGHT: 178.13 LBS | HEART RATE: 101 BPM | BODY MASS INDEX: 27.96 KG/M2 | HEIGHT: 67 IN | TEMPERATURE: 97 F | RESPIRATION RATE: 18 BRPM | SYSTOLIC BLOOD PRESSURE: 132 MMHG | OXYGEN SATURATION: 98 %

## 2017-04-27 DIAGNOSIS — H92.09 EAR PAIN, UNSPECIFIED LATERALITY: Primary | ICD-10-CM

## 2017-04-27 PROCEDURE — 99203 OFFICE O/P NEW LOW 30 MIN: CPT | Mod: S$GLB,,, | Performed by: NURSE PRACTITIONER

## 2017-04-27 PROCEDURE — 99999 PR PBB SHADOW E&M-EST. PATIENT-LVL V: CPT | Mod: PBBFAC,,, | Performed by: NURSE PRACTITIONER

## 2017-04-27 RX ORDER — GABAPENTIN 100 MG/1
300 CAPSULE ORAL 3 TIMES DAILY
Qty: 360 CAPSULE | Refills: 11
Start: 2017-04-27 | End: 2017-04-28 | Stop reason: SDUPTHER

## 2017-04-27 RX ORDER — LEVOTHYROXINE SODIUM 150 UG/1
150 TABLET ORAL EVERY MORNING
Qty: 30 TABLET | Refills: 12
Start: 2017-04-27 | End: 2017-05-31 | Stop reason: SDUPTHER

## 2017-04-27 RX ORDER — NORTRIPTYLINE HYDROCHLORIDE 25 MG/1
25 CAPSULE ORAL NIGHTLY
Qty: 30 CAPSULE | Refills: 3 | Status: ON HOLD
Start: 2017-04-27 | End: 2018-01-01 | Stop reason: HOSPADM

## 2017-04-27 NOTE — MR AVS SNAPSHOT
[facility]  [address]  [place] [address]  Phone: [phone]                  Lv Crocker   2017 7:20 PM   Office Visit    Description:  Male : 1975   Provider:  [facility]   Department:  [facility]           Reason for Visit     Otalgia           Diagnoses this Visit        Comments    Ear pain, unspecified laterality    -  Primary            To Do List           Future Appointments        Provider Department Dept Phone    2017 9:40 AM HOMER Pina - Otohinolaryngology [phone]    5/15/2017 10:45 AM LABORATORY, [facility] [phone]    5/15/2017 2:00 PM Adolfo Robles MD [facility] Oncology [phone]    2017 9:00 AM GASTRO NURSE, [facility] Gastro[facility] [phone]      Goals (5 Years of Data)     None      [facility]     [facility] -  Assistance  Unless otherwise directed by your provider, please contact [facility], our nurse care line that is available for  assistance.     Registered nurses in the [facility] provide: appointment scheduling, clinical advisement, health education, and other advisory services.  Call: [phone] (toll free)               Medications           Message regarding Medications     Verify the changes and/or additions to your medication regime listed below are the same as discussed with your clinician today.  If any of these changes or additions are incorrect, please notify your healthcare provider.             Verify that the below list of medications is an accurate representation of the medications you are currently taking.  If none reported, the list may be blank. If incorrect, please contact your healthcare provider. Carry this list with you in case of emergency.           Current Medications     alendronate (FOSAMAX) 70 MG tablet     aspirin (ECOTRIN) 81 MG EC tablet Take  "1 tablet (81 mg total) by mouth once daily.    escitalopram oxalate (LEXAPRO) 20 MG tablet Take 1 tablet (20 mg total) by mouth once daily.    gabapentin (NEURONTIN) 100 MG capsule Take 3 capsules (300 mg total) by mouth 3 (three) times daily.    insulin detemir (LEVEMIR FLEXTOUCH) 100 unit/mL (3 mL) SubQ InPn pen 14 units bid    insulin lispro (HUMALOG KWIKPEN) 100 unit/mL InPn pen 8 units AC + correction Max TDD 40 units    lancets Misc 1 Device by Misc.(Non-Drug; Combo Route) route 4 (four) times daily with meals and nightly.    levothyroxine (SYNTHROID) 150 MCG tablet TAKE 1 TABLET BY MOUTH EVERY MORNING BEFORE BREAKFAST    levothyroxine (SYNTHROID) 150 MCG tablet Take 1 tablet (150 mcg total) by mouth every morning.    magnesium oxide (MAG-OX) 400 mg tablet Take 1 tablet (400 mg total) by mouth once daily.    mycophenolate (MYFORTIC) 180 MG TbEC Take 4 tablets (720 mg total) by mouth 2 (two) times daily. S/P Heart Transplant 11/18/2014 ICD-10 Z94.1    nortriptyline (PAMELOR) 25 MG capsule Take 1 capsule (25 mg total) by mouth every evening.    pantoprazole (PROTONIX) 40 MG tablet TAKE ONE TABLET BY MOUTH EVERY DAY    pen needle, diabetic 32 gauge x 5/32" Ndle Uses 5 pen needles a day    pravastatin (PRAVACHOL) 40 MG tablet Take 1 tablet (40 mg total) by mouth every evening.    predniSONE (DELTASONE) 2.5 MG tablet Take 1 tablet (2.5 mg total) by mouth once daily. S/P Heart Transplant 11/18/2014 ICD-10 Z94.1    tacrolimus (PROGRAF) 1 MG Cap Taked 2mg orally in the morning and 3mg orally in the evening.   S/p heart transplant  11/18/2014  ICD-10 Z94.1    tamsulosin (FLOMAX) 0.4 mg Cp24 Take 2 capsules (0.8 mg total) by mouth every evening.    torsemide (DEMADEX) 20 MG Tab Take 2 tablets (40 mg total) by mouth once daily.           Clinical Reference Information           Your Vitals Were     BP Pulse Temp Resp Height Weight    132/71 101 97.3 °F (36.3 °C) (Tympanic) 18 5' 7" (1.702 m) 80.8 kg (178 lb 2.1 oz)    " SpO2 BMI             98% 27.9 kg/m2         Blood Pressure          Most Recent Value    BP  132/71      Allergies as of 4/27/2017     No Known Drug Allergies      Immunizations Administered on Date of Encounter - 4/27/2017     None      Orders Placed During Today's Visit      Normal Orders This Visit    Ambulatory referral to ENT       Maintenance Dialysis History     Patient has no recorded history of maintenance dialysis.      Transplant Information        Txp Date Organ Coordinator Care Team    11/18/2014 Heart Lisa Wilkinson Referring Physician:  Placido Tobias MD   Corresponding Physician:  Placido Tobias MD   Surgeon:  Prem Nath MD         Language Assistance Services     ATTENTION: Language assistance services are available, free of charge. Please call [phone].      ATENCIÓN: Si habla trino, tiene a hernandez disposición servicios gratuitos de asistencia lingüística. Llame al [phone].     CHÚ Ý: N?u b?n nói Ti?ng Vi?t, có các d?ch v? h? tr? ngôn ng? mi?n phí dành cho b?n. G?i s? [phone].         [facility] complies with applicable Federal civil rights laws and does not discriminate on the basis of race, color, national origin, age, disability, or sex.

## 2017-04-28 ENCOUNTER — OFFICE VISIT (OUTPATIENT)
Dept: OTOLARYNGOLOGY | Facility: CLINIC | Age: 42
End: 2017-04-28
Payer: MEDICARE

## 2017-04-28 VITALS
WEIGHT: 181 LBS | BODY MASS INDEX: 28.35 KG/M2 | HEART RATE: 100 BPM | DIASTOLIC BLOOD PRESSURE: 67 MMHG | TEMPERATURE: 98 F | SYSTOLIC BLOOD PRESSURE: 105 MMHG

## 2017-04-28 DIAGNOSIS — Z94.1 STATUS POST HEART TRANSPLANT: ICD-10-CM

## 2017-04-28 DIAGNOSIS — E03.9 ACQUIRED HYPOTHYROIDISM: ICD-10-CM

## 2017-04-28 DIAGNOSIS — H92.02 OTALGIA OF LEFT EAR: Primary | ICD-10-CM

## 2017-04-28 PROCEDURE — 99203 OFFICE O/P NEW LOW 30 MIN: CPT | Mod: S$GLB,,, | Performed by: PHYSICIAN ASSISTANT

## 2017-04-28 PROCEDURE — 99999 PR PBB SHADOW E&M-EST. PATIENT-LVL IV: CPT | Mod: PBBFAC,,, | Performed by: PHYSICIAN ASSISTANT

## 2017-04-28 RX ORDER — GABAPENTIN 100 MG/1
300 CAPSULE ORAL 3 TIMES DAILY
Qty: 360 CAPSULE | Refills: 11 | Status: SHIPPED | OUTPATIENT
Start: 2017-04-28 | End: 2017-05-02 | Stop reason: SDUPTHER

## 2017-04-28 RX ORDER — FLUTICASONE PROPIONATE 50 MCG
2 SPRAY, SUSPENSION (ML) NASAL DAILY
Qty: 1 BOTTLE | Refills: 12 | Status: SHIPPED | OUTPATIENT
Start: 2017-04-28 | End: 2018-01-01 | Stop reason: CLARIF

## 2017-04-28 NOTE — LETTER
April 28, 2017      Cecy San, [facility]  [address]  [place] [address]           [facility]  [address] [place] [address]  [place] [address]  Phone: [phone]  Fax: [phone]          Patient: Lv Crocker   MR Number: [id number]   YOB: 1975   Date of Visit: 4/28/2017       Dear Cecy San:    Thank you for referring Lv Crocker to me for evaluation. Attached you will find relevant portions of my assessment and plan of care.    If you have questions, please do not hesitate to call me. I look forward to following Lv Crocker along with you.    Sincerely,    HOMER Pinaosure  CC:  No Recipients    If you would like to receive this communication electronically, please contact [email or web address] or [phone] to request more information on [facility] access.    For providers and/or their staff who would like to refer a patient to [facility], please contact us through our one-stop-shop provider referral line, [facility] , at [phone].    If you feel you have received this communication in error or would no longer like to receive these types of communications, please e-mail [email or web address]

## 2017-04-28 NOTE — PROGRESS NOTES
Subjective:       Patient ID: Lv Crocker is a 41 y.o. male.    Chief Complaint: Otalgia (left)    HPI Comments: Patient is a very pleasant 41-year-old male here to see me today for the first time in consultation at the request of RENETTA Becerra for evaluation of left ear pain.  He describes intermittent deep pressure in the left ear with some shooting pains into the left jaw for the past 3-4 days.  He denies ear drainage.  Denies rhinorrhea or nasal congestion.  Denies fever.  Denies change in hearing or tinnitus.  He does have a history of recurrent ear infections in the past and actually had bilateral tympanic membrane rupture secondary to infection about 12 years ago.  He's had no otologic surgeries.  Patient is on immunosuppressive medications as he had a heart transplant in 2014.  He is also currently on fluid restrictions and has been eating more hard candy.    Review of Systems   Constitutional: Positive for fatigue. Negative for fever.   HENT: Positive for ear pain (left), sneezing (few days now) and sore throat (last week). Negative for congestion, ear discharge, facial swelling, hearing loss, nosebleeds, rhinorrhea, sinus pressure, tinnitus and trouble swallowing.    Eyes: Negative for discharge and itching.   Respiratory: Negative for cough and shortness of breath.    Cardiovascular: Negative for chest pain and palpitations.        Heart transplant 2014   Gastrointestinal: Positive for diarrhea (yesterday). Negative for nausea and vomiting.   Musculoskeletal: Positive for arthralgias (feet and ankles; hx of right shoulder injury).   Allergic/Immunologic: Positive for immunocompromised state. Negative for food allergies.   Neurological: Positive for seizures (many years ago) and light-headedness. Negative for dizziness and headaches.   Hematological: Negative for adenopathy.       Objective:      Physical Exam   Constitutional: He is oriented to person, place, and time. He appears  well-developed and well-nourished. He is cooperative.   HENT:   Head: Normocephalic and atraumatic.   Right Ear: Hearing, tympanic membrane, external ear and ear canal normal. Tympanic membrane is not erythematous. No middle ear effusion.   Left Ear: Hearing, external ear and ear canal normal. Tympanic membrane is retracted (mild). Tympanic membrane is not erythematous.  No middle ear effusion.   Nose: Nose normal. No mucosal edema, rhinorrhea, nasal deformity or septal deviation. Right sinus exhibits no maxillary sinus tenderness and no frontal sinus tenderness. Left sinus exhibits no maxillary sinus tenderness and no frontal sinus tenderness.   Mouth/Throat: Uvula is midline, oropharynx is clear and moist and mucous membranes are normal. Mucous membranes are not pale and not dry. No trismus in the jaw. No uvula swelling. No oropharyngeal exudate or posterior oropharyngeal erythema.   Blue discoloration to ventral surface of tongue and palate from candy   Eyes: Conjunctivae, EOM and lids are normal. Pupils are equal, round, and reactive to light. Right eye exhibits no chemosis. Left eye exhibits no chemosis. Right conjunctiva is not injected. Left conjunctiva is not injected.   Neck: Trachea normal and phonation normal. No tracheal tenderness present. No tracheal deviation present. No thyroid mass and no thyromegaly present.   Pulmonary/Chest: Effort normal. No stridor. No respiratory distress.   Lymphadenopathy:        Head (right side): No submental, no submandibular, no preauricular and no posterior auricular adenopathy present.        Head (left side): No submental, no submandibular, no preauricular and no posterior auricular adenopathy present.     He has no cervical adenopathy.   Neurological: He is alert and oriented to person, place, and time. No cranial nerve deficit.   Skin: Skin is warm and dry. No rash noted. No erythema.   Psychiatric: He has a normal mood and affect. His speech is normal and behavior  is normal.   Vitals reviewed.      Assessment:       1. Otalgia of left ear        Plan:       Reassured patient that his ear exam today is normal with the exception of mild retraction of the left tympanic membrane.  There is no middle ear fluid.  No erythema or edema of the ear canal.  No indications today for oral antibiotics.  Discussed trial of Flonase to help with any eustachian tube dysfunction.  He will check with his transplant coordinator prior to initiating.  We also discussed that not all ear pain is ear related and it may actually come from the temporalmandibular joint.  He denies clenching or grinding his teeth.  He has been eating more hard candies lately now as he is on oral fluid restrictions.  We discussed that TMJ arthralgia is typically treated with anti-inflammatories which he may be unable to tolerate due to his transplant history.  Also applying heat and avoiding significant chewing may be of benefit as well.  Instructed him to call with any worsening ear pain or changes in his hearing; otherwise he can return to clinic only as needed.

## 2017-04-28 NOTE — TELEPHONE ENCOUNTER
Reason for Disposition   Caller has cancelled the call before the first contact.    Protocols used: ST NO CONTACT OR DUPLICATE CONTACT CALL-A-AH

## 2017-04-28 NOTE — PROGRESS NOTES
Subjective:       Patient ID: Lv Crocker is a 41 y.o. male.    Chief Complaint: Otalgia    HPI    Lv presents to clinic tonight with complaints of right ear pain x 2 days. He denies sinus pressure or nasal congestion. He does have a history of ear problems and has ruptured his membranes several times. No fever but he is on medication after a transplant.  Review of Systems   Constitutional: Negative for chills and fever.   HENT: Positive for ear pain (Left). Negative for congestion, ear discharge, postnasal drip, sinus pressure and sore throat.    Respiratory: Negative for cough.    Gastrointestinal: Negative for nausea and vomiting.   Neurological: Negative for headaches.   Psychiatric/Behavioral: Negative for behavioral problems and confusion.       Objective:      Physical Exam   Constitutional: He is oriented to person, place, and time. He appears well-developed and well-nourished.   HENT:   Right Ear: Ear canal normal.   Left Ear: Ear canal normal.   Nose: No mucosal edema. Right sinus exhibits no maxillary sinus tenderness and no frontal sinus tenderness. Left sinus exhibits no maxillary sinus tenderness and no frontal sinus tenderness.   Mouth/Throat: No oropharyngeal exudate, posterior oropharyngeal edema or posterior oropharyngeal erythema.   There is pain when I pull back the pinna but I do not see ant external canal issues at this point.    Eyes: Conjunctivae are normal.   Neck: Normal range of motion.   Cardiovascular: Normal rate.    Pulmonary/Chest: Effort normal.   Musculoskeletal: Normal range of motion.   Lymphadenopathy:     He has no cervical adenopathy.   Neurological: He is alert and oriented to person, place, and time.   Skin: Skin is warm and dry.   Psychiatric: He has a normal mood and affect. His behavior is normal.   Vitals reviewed.      Assessment:       1. Ear pain, unspecified laterality        Plan:   Lv was seen today for otalgia.    Diagnoses and all orders for this  visit:    Ear pain, unspecified laterality  -     Ambulatory referral to ENT     I am reluctant to prescribe anything based on his extensive history with transplant and CKD. He states his transplant doctors stress bring very careful with this. I don't appreciate any major ear issues and I would like to be certain that treatment is necessary before treating. Based on his history of ear problems and their level of concern I will get him in the ENT tomorrow. To ER if worsening over night.

## 2017-05-01 DIAGNOSIS — I10 ESSENTIAL HYPERTENSION: ICD-10-CM

## 2017-05-01 DIAGNOSIS — E03.9 ACQUIRED HYPOTHYROIDISM: ICD-10-CM

## 2017-05-01 DIAGNOSIS — Z94.1 HEART TRANSPLANTED: ICD-10-CM

## 2017-05-01 DIAGNOSIS — Z94.1 STATUS POST HEART TRANSPLANT: ICD-10-CM

## 2017-05-01 DIAGNOSIS — D84.9 IMMUNOSUPPRESSION: ICD-10-CM

## 2017-05-01 DIAGNOSIS — N18.30 CHRONIC KIDNEY DISEASE (CKD), STAGE III (MODERATE): ICD-10-CM

## 2017-05-01 RX ORDER — TAMSULOSIN HYDROCHLORIDE 0.4 MG/1
CAPSULE ORAL
Qty: 180 CAPSULE | Refills: 11 | Status: SHIPPED | OUTPATIENT
Start: 2017-05-01 | End: 2017-01-01

## 2017-05-03 RX ORDER — GABAPENTIN 300 MG/1
900 CAPSULE ORAL 3 TIMES DAILY
Qty: 270 CAPSULE | Refills: 11 | Status: ON HOLD | OUTPATIENT
Start: 2017-05-03 | End: 2018-01-01

## 2017-05-15 ENCOUNTER — LAB VISIT (OUTPATIENT)
Dept: LAB | Facility: HOSPITAL | Age: 42
End: 2017-05-15
Attending: INTERNAL MEDICINE
Payer: MEDICARE

## 2017-05-15 DIAGNOSIS — D50.9 IRON DEFICIENCY ANEMIA, UNSPECIFIED: ICD-10-CM

## 2017-05-15 DIAGNOSIS — R18.8 OTHER ASCITES: ICD-10-CM

## 2017-05-15 DIAGNOSIS — D63.1 ANEMIA OF CHRONIC RENAL FAILURE, STAGE 3 (MODERATE): ICD-10-CM

## 2017-05-15 DIAGNOSIS — N18.30 ANEMIA OF CHRONIC RENAL FAILURE, STAGE 3 (MODERATE): ICD-10-CM

## 2017-05-15 LAB
BASOPHILS # BLD AUTO: 0 K/UL
BASOPHILS NFR BLD: 0 %
DIFFERENTIAL METHOD: ABNORMAL
EOSINOPHIL # BLD AUTO: 0.1 K/UL
EOSINOPHIL NFR BLD: 1.4 %
ERYTHROCYTE [DISTWIDTH] IN BLOOD BY AUTOMATED COUNT: 12.9 %
ERYTHROCYTE [SEDIMENTATION RATE] IN BLOOD BY WESTERGREN METHOD: 33 MM/HR
HCT VFR BLD AUTO: 30.4 %
HGB BLD-MCNC: 10 G/DL
LYMPHOCYTES # BLD AUTO: 0.7 K/UL
LYMPHOCYTES NFR BLD: 14.4 %
MCH RBC QN AUTO: 29.5 PG
MCHC RBC AUTO-ENTMCNC: 32.9 %
MCV RBC AUTO: 90 FL
MONOCYTES # BLD AUTO: 0.4 K/UL
MONOCYTES NFR BLD: 8.8 %
NEUTROPHILS # BLD AUTO: 3.7 K/UL
NEUTROPHILS NFR BLD: 75.4 %
PLATELET # BLD AUTO: 178 K/UL
PMV BLD AUTO: 9.6 FL
RBC # BLD AUTO: 3.39 M/UL
SPECIMEN SOURCE: NORMAL
WBC # BLD AUTO: 4.86 K/UL

## 2017-05-15 PROCEDURE — 85651 RBC SED RATE NONAUTOMATED: CPT | Mod: PO

## 2017-05-15 PROCEDURE — 36415 COLL VENOUS BLD VENIPUNCTURE: CPT | Mod: PO

## 2017-05-15 PROCEDURE — 83540 ASSAY OF IRON: CPT

## 2017-05-15 PROCEDURE — 82728 ASSAY OF FERRITIN: CPT

## 2017-05-15 PROCEDURE — 85025 COMPLETE CBC W/AUTO DIFF WBC: CPT | Mod: PO

## 2017-05-16 ENCOUNTER — PATIENT MESSAGE (OUTPATIENT)
Dept: GASTROENTEROLOGY | Facility: CLINIC | Age: 42
End: 2017-05-16

## 2017-05-16 DIAGNOSIS — N18.30 ANEMIA OF CHRONIC RENAL FAILURE, STAGE 3 (MODERATE): Primary | ICD-10-CM

## 2017-05-16 DIAGNOSIS — D63.1 ANEMIA OF CHRONIC RENAL FAILURE, STAGE 3 (MODERATE): Primary | ICD-10-CM

## 2017-05-16 DIAGNOSIS — R18.8 OTHER ASCITES: Primary | ICD-10-CM

## 2017-05-16 LAB
FERRITIN SERPL-MCNC: 601 NG/ML
IRON SERPL-MCNC: 75 UG/DL
SATURATED IRON: 25 %
TOTAL IRON BINDING CAPACITY: 295 UG/DL
TRANSFERRIN SERPL-MCNC: 199 MG/DL

## 2017-05-18 ENCOUNTER — PATIENT MESSAGE (OUTPATIENT)
Dept: HEMATOLOGY/ONCOLOGY | Facility: CLINIC | Age: 42
End: 2017-05-18

## 2017-05-25 ENCOUNTER — HOSPITAL ENCOUNTER (OUTPATIENT)
Dept: RADIOLOGY | Facility: HOSPITAL | Age: 42
Discharge: HOME | End: 2017-05-25
Attending: INTERNAL MEDICINE
Payer: MEDICARE

## 2017-05-25 ENCOUNTER — TELEPHONE (OUTPATIENT)
Dept: RADIOLOGY | Facility: HOSPITAL | Age: 42
End: 2017-05-25

## 2017-05-25 VITALS
HEART RATE: 102 BPM | OXYGEN SATURATION: 96 % | SYSTOLIC BLOOD PRESSURE: 118 MMHG | TEMPERATURE: 98 F | RESPIRATION RATE: 16 BRPM | DIASTOLIC BLOOD PRESSURE: 68 MMHG

## 2017-05-25 DIAGNOSIS — R18.8 OTHER ASCITES: ICD-10-CM

## 2017-05-25 LAB
APTT BLDCRRT: 26.9 SEC
INR PPP: 1.1
PROTHROMBIN TIME: 11.2 SEC

## 2017-05-25 PROCEDURE — 63600175 PHARM REV CODE 636 W HCPCS: Performed by: INTERNAL MEDICINE

## 2017-05-25 PROCEDURE — A7048 VACUUM DRAIN BOTTLE/TUBE KIT: HCPCS

## 2017-05-25 PROCEDURE — 85610 PROTHROMBIN TIME: CPT

## 2017-05-25 PROCEDURE — C1729 CATH, DRAINAGE: HCPCS

## 2017-05-25 PROCEDURE — 85730 THROMBOPLASTIN TIME PARTIAL: CPT

## 2017-05-25 PROCEDURE — P9047 ALBUMIN (HUMAN), 25%, 50ML: HCPCS | Performed by: INTERNAL MEDICINE

## 2017-05-25 RX ORDER — ALBUMIN HUMAN 250 G/1000ML
100 SOLUTION INTRAVENOUS ONCE
Status: COMPLETED | OUTPATIENT
Start: 2017-05-25 | End: 2017-05-25

## 2017-05-25 RX ADMIN — ALBUMIN (HUMAN) 30 G: 25 SOLUTION INTRAVENOUS at 02:05

## 2017-05-25 NOTE — DISCHARGE SUMMARY
Procedure was performed by the radiologist.  Sterile technique was performed in the RLQ, lidocaine was used as a local anesthetic.  3 liters of chylous fluid removed for therapeutic purposes.  Pt tolerated the procedure well without immediate complications.  Please see radiologist report for details. F/u with PCP and/or ordering physician.

## 2017-05-25 NOTE — DISCHARGE INSTRUCTIONS

## 2017-05-25 NOTE — PROGRESS NOTES
Paracentesis complete. Pt tolerated well. 3 liters chylous fluid removed and discarded. Albumin admin per MD order. Band-aid in place to healthy paracentesis site. No signs of bleeding noted. VSS.

## 2017-05-25 NOTE — PLAN OF CARE
Problem: Paracentesis, Abdominal (Adult)  Goal: Signs and Symptoms of Listed Potential Problems Will be Absent, Minimized or Managed (Paracentesis, Abdominal)  Signs and symptoms of listed potential problems will be absent, minimized or managed by discharge/transition of care (reference Paracentesis, Abdominal (Adult) CPG).  Outcome: Outcome(s) achieved Date Met: 05/25/17  Patient discharged home in stable condition via wheelchair with ride. Verbalized understanding of discharge instructions. Patient voiced no complaints at this time. Patient stood at side of bed, walked steps with no new motor or sensory deficits. Neurologically intact.   Band-aid in place to healthy site, no signs of bleeding noted.

## 2017-05-31 ENCOUNTER — TELEPHONE (OUTPATIENT)
Dept: TRANSPLANT | Facility: CLINIC | Age: 42
End: 2017-05-31

## 2017-05-31 ENCOUNTER — PATIENT MESSAGE (OUTPATIENT)
Dept: ENDOCRINOLOGY | Facility: CLINIC | Age: 42
End: 2017-05-31

## 2017-05-31 ENCOUNTER — PATIENT MESSAGE (OUTPATIENT)
Dept: TRANSPLANT | Facility: CLINIC | Age: 42
End: 2017-05-31

## 2017-05-31 DIAGNOSIS — Z94.1 STATUS POST HEART TRANSPLANT: ICD-10-CM

## 2017-05-31 DIAGNOSIS — R18.8 OTHER ASCITES: Primary | ICD-10-CM

## 2017-05-31 DIAGNOSIS — E03.9 ACQUIRED HYPOTHYROIDISM: ICD-10-CM

## 2017-05-31 RX ORDER — LEVOTHYROXINE SODIUM 150 UG/1
150 TABLET ORAL EVERY MORNING
Qty: 30 TABLET | Refills: 12 | Status: ON HOLD
Start: 2017-05-31 | End: 2018-01-01 | Stop reason: HOSPADM

## 2017-05-31 NOTE — TELEPHONE ENCOUNTER
Spoke to pt clarifying the paracentesis order. I sent one for [facility]

## 2017-06-06 ENCOUNTER — PATIENT MESSAGE (OUTPATIENT)
Dept: TRANSPLANT | Facility: CLINIC | Age: 42
End: 2017-06-06

## 2017-06-06 ENCOUNTER — TELEPHONE (OUTPATIENT)
Dept: TRANSPLANT | Facility: CLINIC | Age: 42
End: 2017-06-06

## 2017-06-06 NOTE — TELEPHONE ENCOUNTER
Spoke to pt regarding his orders for Dr. Zuluaga. Orders were placed for 8 visits. Dr. Zuluaga set up an appt for tomorrow at 2pm. Pt notified of this appt.

## 2017-06-07 ENCOUNTER — HOSPITAL ENCOUNTER (OUTPATIENT)
Dept: RADIOLOGY | Facility: HOSPITAL | Age: 42
Discharge: HOME | End: 2017-06-07
Attending: INTERNAL MEDICINE
Payer: MEDICARE

## 2017-06-07 VITALS
HEART RATE: 102 BPM | SYSTOLIC BLOOD PRESSURE: 107 MMHG | OXYGEN SATURATION: 97 % | RESPIRATION RATE: 16 BRPM | WEIGHT: 190 LBS | DIASTOLIC BLOOD PRESSURE: 67 MMHG | BODY MASS INDEX: 29.82 KG/M2 | HEIGHT: 67 IN

## 2017-06-07 DIAGNOSIS — R18.8 OTHER ASCITES: ICD-10-CM

## 2017-06-07 PROCEDURE — A7048 VACUUM DRAIN BOTTLE/TUBE KIT: HCPCS

## 2017-06-07 PROCEDURE — C1729 CATH, DRAINAGE: HCPCS

## 2017-06-07 NOTE — PROGRESS NOTES
Paracentesis complete. Pt tolerated well. Denies discomfort. 2.25liters chylous fluid removed and discarded. Band-aid in place to healthy site. No signs of bleeding noted. VSS.

## 2017-06-07 NOTE — H&P
[facility] -   History & Physical    Subjective:      Chief Complaint/Reason for Admission: ascites    Lv Crocker is a 41 y.o. male.    Past Medical History:   Diagnosis Date    Arthritis     CAD (coronary artery disease)     Coronary artery disease    Cardiomyopathy     CHF (congestive heart failure)     Depression     Encounter for blood transfusion     GERD (gastroesophageal reflux disease)     Hashimoto's disease     HLD (hyperlipidemia) 6/11/2015    Hypoglycemia unawareness in type 1 diabetes mellitus     Hypothyroid     Myocardial infarction     Syncope 6/30/2015    Type I (juvenile type) diabetes mellitus with unspecified complication, uncontrolled     Diagnosis at 6 years old    Unspecified essential hypertension 5/29/2014     Past Surgical History:   Procedure Laterality Date    CARDIAC CATHETERIZATION      CARDIAC SURGERY      CHOLECYSTECTOMY      CORONARY ANGIOPLASTY      FOOT SPLIT TRANSFER OF THE POSTERIOR TIBIALIS TENDON PROCEDURE      HEART TRANSPLANT  2014    KNEE SURGERY      s/p oht  November 2014    stent placemnet       Family History   Problem Relation Age of Onset    Heart disease Mother     Kidney disease Mother     Diabetes Mother     Hypertension Mother     Kidney disease Father     Diabetes Father     Hypertension Father     Stroke Father     Heart disease Brother     Stroke Brother     Diabetes Brother     Cancer Brother 50     pancreatic    Vision loss Brother     Hypertension Brother     Diabetes Son     Diabetes Sister     Diabetes Maternal Uncle     Diabetes Paternal Aunt     Diabetes Maternal Grandmother     Diabetes Maternal Grandfather      Social History   Substance Use Topics    Smoking status: Never Smoker    Smokeless tobacco: Never Used    Alcohol use No         (Not in a hospital admission)  Review of patient's allergies indicates:   Allergen Reactions    No known drug allergies         Review of Systems    Constitutional: Negative.    Eyes: Negative.    Gastrointestinal: Negative.    Skin: Negative.        Objective:      Vital Signs (Most Recent)  Pulse: 107 (06/07/17 1404)  Resp: 16 (06/07/17 1404)  BP: 120/77 (06/07/17 1404)  SpO2: 99 % (06/07/17 1404)    Vital Signs Range (Last 24H):  Pulse:  [107]   Resp:  [16]   BP: (120)/(77)   SpO2:  [99 %]     Physical Exam   Constitutional: He appears well-developed.   Pulmonary/Chest: Effort normal.   Abdominal: He exhibits distension.       Data Review:    CBC:   Lab Results   Component Value Date    WBC 4.86 05/15/2017    RBC 3.39 (L) 05/15/2017    HGB 10.0 (L) 05/15/2017    HCT 30.4 (L) 05/15/2017    HCT 24 (L) 11/21/2014     05/15/2017      ECG: no prior ECG.     Assessment:      There are no hospital problems to display for this patient.      Plan:    U/S guided paracentesis

## 2017-06-07 NOTE — DISCHARGE SUMMARY
Sterile technique was performed in the RLQ, lidocaine was used as a local anesthetic.  2.25 liters of chylous fluid removed for therapeutic purposes.  Pt tolerated the procedure well without immediate complications.  Please see radiologist report for details. F/u with PCP and/or ordering physician.

## 2017-06-07 NOTE — DISCHARGE INSTRUCTIONS

## 2017-06-08 DIAGNOSIS — Z94.1 HEART TRANSPLANTED: ICD-10-CM

## 2017-06-08 DIAGNOSIS — D84.9 IMMUNOSUPPRESSION: ICD-10-CM

## 2017-06-08 DIAGNOSIS — N18.30 CHRONIC KIDNEY DISEASE (CKD), STAGE III (MODERATE): ICD-10-CM

## 2017-06-08 DIAGNOSIS — I10 ESSENTIAL HYPERTENSION: ICD-10-CM

## 2017-06-08 RX ORDER — TAMSULOSIN HYDROCHLORIDE 0.4 MG/1
CAPSULE ORAL
Qty: 60 CAPSULE | Refills: 0 | Status: SHIPPED | OUTPATIENT
Start: 2017-06-08 | End: 2017-01-01 | Stop reason: SDUPTHER

## 2017-06-12 ENCOUNTER — PATIENT MESSAGE (OUTPATIENT)
Dept: HEMATOLOGY/ONCOLOGY | Facility: CLINIC | Age: 42
End: 2017-06-12

## 2017-06-19 NOTE — PROGRESS NOTES
Hematology/Oncology Office      CC:  Anemia    Referred by:  No ref. provider found    Diagnosis:  Multifactorial anemia including CK D    Treatment:  Procrit    History of present illness:  41-year-old gentleman with a history of multifactorial anemia including anemia of chronic kidney disease.  He is followed by Dr. Robles in the hematology/oncology clinic and receives DAQUAN intermittently to maintain adequate H&H.  He presents today without complaints and specifically denies fever, chills, night sweats, weight loss, nausea/vomiting/or diarrhea.      Past Medical History:   Diagnosis Date    Arthritis     CAD (coronary artery disease)     Coronary artery disease    Cardiomyopathy     CHF (congestive heart failure)     Depression     Encounter for blood transfusion     GERD (gastroesophageal reflux disease)     Hashimoto's disease     HLD (hyperlipidemia) 6/11/2015    Hypoglycemia unawareness in type 1 diabetes mellitus     Hypothyroid     Myocardial infarction     Syncope 6/30/2015    Type I (juvenile type) diabetes mellitus with unspecified complication, uncontrolled     Diagnosis at 6 years old    Unspecified essential hypertension 5/29/2014         Social History:      Family History: family history includes Cancer (age of onset: 50) in his brother; Diabetes in his brother, father, maternal grandfather, maternal grandmother, maternal uncle, mother, paternal aunt, sister, and son; Heart disease in his brother and mother; Hypertension in his brother, father, and mother; Kidney disease in his father and mother; Stroke in his brother and father; Vision loss in his brother.      HPI  Review of Systems   Constitutional: Negative for activity change, appetite change, fatigue, fever and unexpected weight change.   HENT: Negative for congestion, facial swelling, nosebleeds, rhinorrhea, sinus pressure, sneezing, sore throat, trouble swallowing and voice change.    Eyes: Negative for photophobia, pain,  "discharge, itching and visual disturbance.   Respiratory: Negative for apnea, cough, choking, chest tightness and shortness of breath.    Cardiovascular: Negative for chest pain, palpitations and leg swelling.   Gastrointestinal: Negative for abdominal distention, abdominal pain, anal bleeding, blood in stool, constipation, diarrhea, nausea and vomiting.   Endocrine: Negative for cold intolerance, heat intolerance, polydipsia, polyphagia and polyuria.   Genitourinary: Negative for decreased urine volume, difficulty urinating, frequency, genital sores, hematuria, testicular pain and urgency.   Musculoskeletal: Negative for arthralgias, back pain, gait problem, joint swelling, myalgias, neck pain and neck stiffness.   Skin: Negative for color change, pallor, rash and wound.   Allergic/Immunologic: Negative for environmental allergies, food allergies and immunocompromised state.   Neurological: Negative for dizziness, tremors, syncope, speech difficulty, weakness, light-headedness, numbness and headaches.   Hematological: Negative for adenopathy. Does not bruise/bleed easily.   Psychiatric/Behavioral: Negative for agitation, confusion and hallucinations. The patient is not nervous/anxious and is not hyperactive.        Objective:       Vitals:    06/19/17 1359   BP: (!) 100/56   Pulse: 110   Resp: 18   Temp: 98.1 °F (36.7 °C)   TempSrc: Oral   SpO2: 98%   Weight: 82.6 kg (182 lb 1.6 oz)   Height: 5' 7" (1.702 m)     Physical Exam   Constitutional: He is oriented to person, place, and time. He appears well-developed and well-nourished.  Non-toxic appearance. He does not appear ill. No distress.   HENT:   Head: Normocephalic and atraumatic.   Right Ear: Tympanic membrane and ear canal normal.   Left Ear: Tympanic membrane and ear canal normal.   Nose: Nose normal. Right sinus exhibits no maxillary sinus tenderness and no frontal sinus tenderness. Left sinus exhibits no maxillary sinus tenderness and no frontal sinus " tenderness.   Mouth/Throat: Oropharynx is clear and moist and mucous membranes are normal. No oral lesions. Normal dentition. No oropharyngeal exudate, posterior oropharyngeal edema or posterior oropharyngeal erythema.   Eyes: Conjunctivae, EOM and lids are normal. Pupils are equal, round, and reactive to light. Right conjunctiva is not injected. Right conjunctiva has no hemorrhage. Left conjunctiva is not injected. Left conjunctiva has no hemorrhage. No scleral icterus.   Neck: Normal range of motion. Neck supple. No JVD present. No spinous process tenderness and no muscular tenderness present. No tracheal deviation present. No thyromegaly present.   Cardiovascular: Normal rate, regular rhythm, normal heart sounds and intact distal pulses.    No murmur heard.  Pulmonary/Chest: Effort normal and breath sounds normal. No respiratory distress. He has no decreased breath sounds. He has no wheezes. He has no rhonchi. He has no rales. He exhibits no tenderness.   Abdominal: Soft. Bowel sounds are normal. He exhibits no distension and no mass. There is no hepatosplenomegaly. There is no tenderness. There is no rebound and no guarding.   Musculoskeletal: Normal range of motion. He exhibits no edema or tenderness.   Lymphadenopathy:        Head (right side): No submental and no submandibular adenopathy present.        Head (left side): No submental and no submandibular adenopathy present.     He has no cervical adenopathy.     He has no axillary adenopathy.   Neurological: He is alert and oriented to person, place, and time. No cranial nerve deficit. He exhibits normal muscle tone. Coordination normal.   Skin: Skin is warm and dry. No rash noted. He is not diaphoretic. No cyanosis or erythema. No pallor. Nails show no clubbing.   Psychiatric: He has a normal mood and affect. His speech is normal and behavior is normal. Judgment and thought content normal.       Lab Results   Component Value Date    WBC 6.68 06/19/2017     HGB 10.8 (L) 06/19/2017    HCT 32.5 (L) 06/19/2017    MCV 92 06/19/2017     06/19/2017     Lab Results   Component Value Date    CREATININE 2.4 (H) 04/05/2017    BUN 34 (H) 04/05/2017     04/05/2017    K 4.0 04/05/2017    CL 98 04/05/2017    CO2 28 04/05/2017     Lab Results   Component Value Date    ALT 17 04/05/2017    AST 28 04/05/2017    ALKPHOS 262 (H) 04/05/2017    BILITOT 0.8 04/05/2017         Assessment:       41-year-old gentleman followed by Dr. Robles in the hematology/oncology clinic for anemia secondary to chronic renal disease.  I am seeing the patient for the first time today in Dr. Robles's absence.  Hemoglobin remains adequate at 10.8, therefore, there is no need to reinitiate Procrit at this point.  He will follow-up with Dr. Robles in approximately 3 months with repeat CBC.      Anemia secondary to chronic renal disease:  --Continue to monitor CBC and reinitiate Procrit when hemoglobin falls below 10    Iron deficiency anemia:  --Continue to monitor iron studies and replete accordingly    Cryptogenic cirrhosis:  --Continue to follow in hepatology/GI clinic

## 2017-06-21 NOTE — PROGRESS NOTES
Subjective:      Patient ID: Lv Crocker is a 41 y.o. male.    Chief Complaint: Sore Throat and Fever      HPI  Mr. Crocker presents for sore throat and fever. He reports having headache and cough since last night and nausea and dry heaving since 3 am this morning. He took his temp at home, which was 101 F. No OTC rx. Of note, he has been taking prednisone 2.5 mg po qd for his heart transplant, which was 11/18/14. He reports his cough is mainly dry. He reports his head hurts in the frontal region. No ABx in past 3 months.     Past Medical History:   Diagnosis Date    Arthritis     CAD (coronary artery disease)     Coronary artery disease    Cardiomyopathy     CHF (congestive heart failure)     Depression     Encounter for blood transfusion     GERD (gastroesophageal reflux disease)     Hashimoto's disease     HLD (hyperlipidemia) 6/11/2015    Hypoglycemia unawareness in type 1 diabetes mellitus     Hypothyroid     Myocardial infarction     Syncope 6/30/2015    Type I (juvenile type) diabetes mellitus with unspecified complication, uncontrolled     Diagnosis at 6 years old    Unspecified essential hypertension 5/29/2014     Past Surgical History:   Procedure Laterality Date    CARDIAC CATHETERIZATION      CARDIAC SURGERY      CHOLECYSTECTOMY      CORONARY ANGIOPLASTY      FOOT SPLIT TRANSFER OF THE POSTERIOR TIBIALIS TENDON PROCEDURE      HEART TRANSPLANT  2014    KNEE SURGERY      s/p oht  November 2014    stent placemnet       Social History     Social History    Marital status:      Spouse name: N/A    Number of children: N/A    Years of education: N/A     Occupational History    Not on file.     Social History Main Topics    Smoking status: Never Smoker    Smokeless tobacco: Never Used    Alcohol use No    Drug use: No    Sexual activity: Yes     Partners: Female     Other Topics Concern    Not on file     Social History Narrative         Family History    Problem Relation Age of Onset    Heart disease Mother     Kidney disease Mother     Diabetes Mother     Hypertension Mother     Kidney disease Father     Diabetes Father     Hypertension Father     Stroke Father     Heart disease Brother     Stroke Brother     Diabetes Brother     Cancer Brother 50     pancreatic    Vision loss Brother     Hypertension Brother     Diabetes Son     Diabetes Sister     Diabetes Maternal Uncle     Diabetes Paternal Aunt     Diabetes Maternal Grandmother     Diabetes Maternal Grandfather        Current Outpatient Prescriptions:     alendronate (FOSAMAX) 70 MG tablet, , Disp: , Rfl:     aspirin (ECOTRIN) 81 MG EC tablet, Take 1 tablet (81 mg total) by mouth once daily., Disp: 30 tablet, Rfl: 11    escitalopram oxalate (LEXAPRO) 20 MG tablet, Take 1 tablet (20 mg total) by mouth once daily., Disp: 30 tablet, Rfl: 11    fluticasone (FLONASE) 50 mcg/actuation nasal spray, 2 sprays by Each Nare route once daily., Disp: 1 Bottle, Rfl: 12    gabapentin (NEURONTIN) 300 MG capsule, Take 3 capsules (900 mg total) by mouth 3 (three) times daily., Disp: 270 capsule, Rfl: 11    insulin detemir (LEVEMIR FLEXTOUCH) 100 unit/mL (3 mL) SubQ InPn pen, 14 units bid, Disp: 15 mL, Rfl: 12    insulin lispro (HUMALOG KWIKPEN) 100 unit/mL InPn pen, 8 units AC + correction Max TDD 40 units, Disp: 15 mL, Rfl: 12    lancets Misc, 1 Device by Misc.(Non-Drug; Combo Route) route 4 (four) times daily with meals and nightly., Disp: 100 each, Rfl: 5    levothyroxine (SYNTHROID) 150 MCG tablet, Take 1 tablet (150 mcg total) by mouth every morning., Disp: 30 tablet, Rfl: 12    magnesium oxide (MAG-OX) 400 mg tablet, Take 1 tablet (400 mg total) by mouth once daily., Disp: 30 tablet, Rfl: 11    mycophenolate (MYFORTIC) 180 MG TbEC, Take 4 tablets (720 mg total) by mouth 2 (two) times daily. S/P Heart Transplant 11/18/2014 ICD-10 Z94.1, Disp: 240 tablet, Rfl: 11    nortriptyline (PAMELOR)  "25 MG capsule, Take 1 capsule (25 mg total) by mouth every evening., Disp: 30 capsule, Rfl: 3    pantoprazole (PROTONIX) 40 MG tablet, TAKE ONE TABLET BY MOUTH EVERY DAY, Disp: 30 tablet, Rfl: 11    pen needle, diabetic 32 gauge x 5/32" Ndle, Uses 5 pen needles a day, Disp: 200 each, Rfl: 12    pravastatin (PRAVACHOL) 40 MG tablet, Take 1 tablet (40 mg total) by mouth every evening., Disp: 30 tablet, Rfl: 11    predniSONE (DELTASONE) 2.5 MG tablet, Take 1 tablet (2.5 mg total) by mouth once daily. S/P Heart Transplant 11/18/2014 ICD-10 Z94.1, Disp: 30 tablet, Rfl: 11    tacrolimus (PROGRAF) 1 MG Cap, Taked 2mg orally in the morning and 3mg orally in the evening.  S/p heart transplant  11/18/2014  ICD-10 Z94.1, Disp: 150 capsule, Rfl: 11    tamsulosin (FLOMAX) 0.4 mg Cp24, TAKE 2 CAPSULES(0.8 MG) BY MOUTH EVERY EVENING, Disp: 180 capsule, Rfl: 11    tamsulosin (FLOMAX) 0.4 mg Cp24, TAKE 2 CAPSULES(0.8 MG) BY MOUTH EVERY EVENING, Disp: 60 capsule, Rfl: 0    torsemide (DEMADEX) 20 MG Tab, Take 2 tablets (40 mg total) by mouth once daily., Disp: 60 tablet, Rfl: 11    azithromycin (Z-ORVILLE) 250 MG tablet, Take 2 tablets by mouth on day 1; Take 1 tablet by mouth on days 2-5, Disp: 6 tablet, Rfl: 0    ondansetron (ZOFRAN-ODT) 4 MG TbDL, Take 2 tablets (8 mg total) by mouth every 8 (eight) hours as needed (nausea)., Disp: 15 tablet, Rfl: 0    Review of patient's allergies indicates:   Allergen Reactions    No known drug allergies      Review of Systems   Constitutional: +fever  Cardiovascular: Negative.   Respiratory: + cough  Gastrointestinal: +nausea. No emesis. No diarrhea.    Objective:     BP (!) 118/55 (BP Location: Left arm, Patient Position: Sitting, BP Method: Manual)   Pulse (!) 128   Temp (!) 101.9 °F (38.8 °C) (Tympanic)   Resp 18   Ht 5' 7" (1.702 m)   Wt 82 kg (180 lb 12.4 oz)   SpO2 98%   BMI 28.31 kg/m²     Physical Exam  GEN: A&O fully, NAD  PSYC: Normal affect  HEENT: OP: Clear, no LAD, no " thyroid masses  CV: RRR, no M/G/R  PULM: CTA bilaterally, no wheezes, rales    Lab Results   Component Value Date    WBC 6.68 06/19/2017    HGB 10.8 (L) 06/19/2017    HCT 32.5 (L) 06/19/2017     06/19/2017    CHOL 154 04/05/2017    TRIG 103 04/05/2017    HDL 31 (L) 04/05/2017    ALT 17 04/05/2017    AST 28 04/05/2017     04/05/2017    K 4.0 04/05/2017    CL 98 04/05/2017    CREATININE 2.4 (H) 04/05/2017    BUN 34 (H) 04/05/2017    CO2 28 04/05/2017    TSH 0.769 01/23/2017    PSA 0.59 12/06/2012    INR 1.1 05/25/2017    HGBA1C 7.2 (H) 04/05/2017       Assessment:      1. Nausea: Likely 2/2 infection i.e. PNA vs sinusitis   2. Cough: Will tx empirically 2/2 immunocompromised state. If significant pna on CXR, will consider adding another ABx.     Plan:   Nausea  -     ondansetron (ZOFRAN-ODT) 4 MG TbDL; Take 2 tablets (8 mg total) by mouth every 8 (eight) hours as needed (nausea).  Dispense: 15 tablet; Refill: 0    Cough  -     azithromycin (Z-ORVILLE) 250 MG tablet; Take 2 tablets by mouth on day 1; Take 1 tablet by mouth on days 2-5  Dispense: 6 tablet; Refill: 0  -     X-Ray Chest PA And Lateral; Future; Expected date: 06/21/2017      RTC prn

## 2017-06-29 NOTE — DISCHARGE INSTRUCTIONS

## 2017-06-29 NOTE — PROGRESS NOTES
Paracentesis complete. Pt tolerated well. 3.5 liters chylous fluid removed and discarded. Band-aid in place to healthy site. No signs of bleeding noted. Vss. Denies discomfort.

## 2017-07-11 NOTE — TELEPHONE ENCOUNTER
----- Message from Adolfo Robles MD sent at 7/10/2017  5:18 PM CDT -----  Recent CBC results show that his hemoglobin is less than 10 g/dL.  He might benefit from restarting weekly Procrit injections.  Please have the patient come and see me when possible this week in the clinic and please put him on the chemotherapy schedule for possible Procrit.  Thank you.

## 2017-07-12 NOTE — PROGRESS NOTES
Reason for visit: Chronic normocytic anemia    HPI:   The patient is a 41-year-old  male who presents to the clinic today for follow up to discuss further evaluation and management recommendations for chronic normocytic anemia.  The patient has had treatment of antibody mediated rejection with regard to his history of heart transplant.  During the course of that hospitalization he was also noted to be neutropenic and was also treated for C. difficile colitis.  Hem/Onc evaluated him and a bone marrow aspiration and biopsy was also performed.  He was given intermittent Neupogen injections for support with regard to his neutropenia.  The patient also had treatment for CMV infection and was treated with IV ganciclovir.  He was previously also on by mouth valganciclovir which has since been stopped.  He continues to be on Prograf, mycophenolate and prednisone for immunosuppression.    Today the patient reports that he continues on oral Demadex. He continues to have problems with volume status and abnormal kidney function.  The patient denies any fevers, chills or night sweats at this time.  He denies any melena, hematochezia, and emesis, hemoptysis or hematuria.  He denies any chest pain. He has been having paracentesis at periodic intervals  I have reviewed all of the details with regard to the patient's medical history available in Epic and have utilized this in my evaluation and management recommendations today.    PAST MEDICAL HISTORY:   1.  Type 1 diabetes mellitus  2.  Hypothyroidism due to Hashimoto's thyroiditis  3.  Chronic kidney disease stage III  4.  Restrictive cardiomyopathy  5.  CMV infection  6.  Chronic immunosuppression  7.  C. difficile colitis  8.  Osteopenia  9.  BPH  10. 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  Bilateral arthroscopy of the knees  2.  Right ankle fusion  3.  Cholecystectomy    FAMILY HISTORY: The patient's brother  from  complications related to pancreatic cancer at the age of 51.  He denies any other immediate family members with cancer or bleeding/clotting disorders.    SOCIAL HISTORY: He does not smoke cigarettes.  He does not drink alcohol.  He has never used any recreational drugs.  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  He lives in [place], [place].    ALLERGIES: [NKDA]    MEDICATIONS: [Medcard has been reviewed and/or reconciled.]    REVIEW OF SYSTEMS:   GENERAL: [No fevers, chills or sweats. Reports chronic fatigue. Denies weight loss or loss of appetite.]  HEENT: [No blurred vision, tinnitus, nasal discharge, sorethroat or dysphagia.]  HEART: [No chest pain, palpitations or shortness of breath.]    LUNGS: [No cough, hemoptysis or breathing problems.]  ABDOMEN: [No abdominal pain, nausea, vomiting, diarrhea, constipation or melena.]  GENITOURINARY: [No dysuria, bleeding or malodorous discharge.]  NEURO: [No headache, dizziness or vertigo.]  HEMATOLOGY: [No easy bruising, spontaneous bleeding or blood clots in the past].  MUSCULOSKELETAL: [No arthralgias, myalgias or bone pains.]  SKIN: [No rashes or skin lesions.]  PSYCHIATRY: [No depression or anxiety.]    PHYSICAL EXAMINATION:   VS: Reviewed on nurse's notes.  APPEARANCE: The patient is a chronically ill appearing and well-groomed  male who appears in no acute distress.  HEENT: No scleral icterus. Both external auditory canals clear. No oral ulcers, lesions. Throat clear  HEAD: No sinus tenderness.  NECK: Supple. No palpable lymphadenopathy. Thyroid non-tender, no palpable masses  CHEST: Decreased breath sounds in the right lung likely due to pleural effusion.  No rales. No rhonchi. Unlabored respirations.  CARDIOVASCULAR: Normal S1, S2. Normal rate. Regular rhythm.  ABDOMEN: Bowel sounds normal. No tenderness. No palpable hepatosplenomegaly. Mild abdominal distension.  LYMPHATIC: No palpable  supraclavicular, axillary nodes  EXTREMITIES: No clubbing, cyanosis.  Trace pitting edema of bilateral lower extremities.  SKIN: No lesions. No petechiae. No ecchymoses. No induration or nodules  NEUROLOGIC: No focal findings. Alert & Oriented x 3. Mood appropriate to affect    LABS:   Reviewed    IMAGING:  Reviewed    IMPRESSION:  1.  Chronic normocytic anemia  2.  Orthotopic heart transplant with chronic immunosuppression  3.  Iron deficiency anemia  4.  Anemia due to chronic kidney disease    PLAN:  1.  I had a detailed discussion with the patient today with regard to his current clinical situation.  The results of his bone marrow aspiration and biopsy from May 2015 were previously discussed in detail with the patient.  He did have decreased storage iron.  He has been given 2 doses of IV iron in the past and tolerated them well. He is on Procrit injections as needed for treatment of anemia due to chronic kidney disease. There was no definitive evidence of leukemia or lymphoma noted on the results of his bone marrow aspiration and biopsy.  Cytogenetics were normal.  2. Results of most recent CBC show Hb<10 gm/dl. Restart weekly procrit injections at this time.    Follow up in 6 weeks with labs. Possible procrit at follow up. He knows to call sooner for any new problems or questions.    Adolfo Robles MD

## 2017-07-17 NOTE — PATIENT INSTRUCTIONS
[facility]  [address]  [phone] phone     [phone] fax  Hours of Operation: Monday- Friday 8:00am- 5:00pm  After hours phone  [phone]  Hematology / Oncology Physicians on call      Dr. Zane Thomson                      Please call with any concerns regarding your appointment today.

## 2017-07-25 NOTE — PROGRESS NOTES
Procedure complete. Patient tolerated well. 3.5 L of chylous fluid removed. Bandaid placed to healthy puncture site, CDI, WNL. VS stable. Patient denies complaints.

## 2017-07-25 NOTE — DISCHARGE SUMMARY
Sterile technique was performed in the LLQ, lidocaine was used as a local anesthetic.  3.5 liters of chylous fluid removed for therapeutic purposes.  Pt tolerated the procedure well without immediate complications.  Please see radiologist report for details. F/u with PCP and/or ordering physician.

## 2017-07-25 NOTE — H&P (VIEW-ONLY)
Reason for visit: Chronic normocytic anemia    HPI:   The patient is a 41-year-old  male who presents to the clinic today for follow up to discuss further evaluation and management recommendations for chronic normocytic anemia.  The patient has had treatment of antibody mediated rejection with regard to his history of heart transplant.  During the course of that hospitalization he was also noted to be neutropenic and was also treated for C. difficile colitis.  Hem/Onc evaluated him and a bone marrow aspiration and biopsy was also performed.  He was given intermittent Neupogen injections for support with regard to his neutropenia.  The patient also had treatment for CMV infection and was treated with IV ganciclovir.  He was previously also on by mouth valganciclovir which has since been stopped.  He continues to be on Prograf, mycophenolate and prednisone for immunosuppression.    Today the patient reports that he continues on oral Demadex. He continues to have problems with volume status and abnormal kidney function.  The patient denies any fevers, chills or night sweats at this time.  He denies any melena, hematochezia, and emesis, hemoptysis or hematuria.  He denies any chest pain. He has been having paracentesis at periodic intervals  I have reviewed all of the details with regard to the patient's medical history available in Epic and have utilized this in my evaluation and management recommendations today.    PAST MEDICAL HISTORY:   1.  Type 1 diabetes mellitus  2.  Hypothyroidism due to Hashimoto's thyroiditis  3.  Chronic kidney disease stage III  4.  Restrictive cardiomyopathy  5.  CMV infection  6.  Chronic immunosuppression  7.  C. difficile colitis  8.  Osteopenia  9.  BPH  10. 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  Bilateral arthroscopy of the knees  2.  Right ankle fusion  3.  Cholecystectomy    FAMILY HISTORY: The patient's brother  from  complications related to pancreatic cancer at the age of 51.  He denies any other immediate family members with cancer or bleeding/clotting disorders.    SOCIAL HISTORY: He does not smoke cigarettes.  He does not drink alcohol.  He has never used any recreational drugs.  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  He lives in [place], [place].    ALLERGIES: [NKDA]    MEDICATIONS: [Medcard has been reviewed and/or reconciled.]    REVIEW OF SYSTEMS:   GENERAL: [No fevers, chills or sweats. Reports chronic fatigue. Denies weight loss or loss of appetite.]  HEENT: [No blurred vision, tinnitus, nasal discharge, sorethroat or dysphagia.]  HEART: [No chest pain, palpitations or shortness of breath.]    LUNGS: [No cough, hemoptysis or breathing problems.]  ABDOMEN: [No abdominal pain, nausea, vomiting, diarrhea, constipation or melena.]  GENITOURINARY: [No dysuria, bleeding or malodorous discharge.]  NEURO: [No headache, dizziness or vertigo.]  HEMATOLOGY: [No easy bruising, spontaneous bleeding or blood clots in the past].  MUSCULOSKELETAL: [No arthralgias, myalgias or bone pains.]  SKIN: [No rashes or skin lesions.]  PSYCHIATRY: [No depression or anxiety.]    PHYSICAL EXAMINATION:   VS: Reviewed on nurse's notes.  APPEARANCE: The patient is a chronically ill appearing and well-groomed  male who appears in no acute distress.  HEENT: No scleral icterus. Both external auditory canals clear. No oral ulcers, lesions. Throat clear  HEAD: No sinus tenderness.  NECK: Supple. No palpable lymphadenopathy. Thyroid non-tender, no palpable masses  CHEST: Decreased breath sounds in the right lung likely due to pleural effusion.  No rales. No rhonchi. Unlabored respirations.  CARDIOVASCULAR: Normal S1, S2. Normal rate. Regular rhythm.  ABDOMEN: Bowel sounds normal. No tenderness. No palpable hepatosplenomegaly. Mild abdominal distension.  LYMPHATIC: No palpable  supraclavicular, axillary nodes  EXTREMITIES: No clubbing, cyanosis.  Trace pitting edema of bilateral lower extremities.  SKIN: No lesions. No petechiae. No ecchymoses. No induration or nodules  NEUROLOGIC: No focal findings. Alert & Oriented x 3. Mood appropriate to affect    LABS:   Reviewed    IMAGING:  Reviewed    IMPRESSION:  1.  Chronic normocytic anemia  2.  Orthotopic heart transplant with chronic immunosuppression  3.  Iron deficiency anemia  4.  Anemia due to chronic kidney disease    PLAN:  1.  I had a detailed discussion with the patient today with regard to his current clinical situation.  The results of his bone marrow aspiration and biopsy from May 2015 were previously discussed in detail with the patient.  He did have decreased storage iron.  He has been given 2 doses of IV iron in the past and tolerated them well. He is on Procrit injections as needed for treatment of anemia due to chronic kidney disease. There was no definitive evidence of leukemia or lymphoma noted on the results of his bone marrow aspiration and biopsy.  Cytogenetics were normal.  2. Results of most recent CBC show Hb<10 gm/dl. Restart weekly procrit injections at this time.    Follow up in 6 weeks with labs. Possible procrit at follow up. He knows to call sooner for any new problems or questions.    Adolfo Robles MD

## 2017-07-25 NOTE — PLAN OF CARE
Patient d/c home in stable condition via wheelchair with ride. Verbalized understanding of d/c instructions. Patient voiced no complaints at this time. Patient stood at side of bed, walked steps with no new motor deficits. Neurologically intact.

## 2017-07-25 NOTE — DISCHARGE INSTRUCTIONS
Discharge Instructions for Paracentesis  Paracentesis is a procedure to remove extra fluid from your belly (abdomen). This fluid buildup in the abdomen is called ascites. The procedure may have been done to take a sample of the fluid. Or, it may have been done to drain the extra fluid from your abdomen and help make you more comfortable.     Ascites is buildup of excess fluid in the abdomen.   Home care  · If you have pain after the procedure, your healthcare provider can prescribe or recommend pain medicines. Take these exactly as directed. If you stopped taking other medicines before the procedure, ask your provider when you can start them again.  · Take it easy for 24 hours after the procedure. Avoid physical activity until your provider says its OK.  · You will have a small bandage over the puncture site. Stitches (sutures), surgical staples, adhesive tapes, adhesive strips, or surgical glue may be used to close the incision. They also help stop bleeding and speed healing. You may take the bandage off in 24 hours.  · Check the puncture site for the signs of infection listed below.  Follow-up care  Make a follow-up appointment with your healthcare provider as directed. During your follow-up visit, your provider will check your healing. Let your provider know how you are feeling. You can also discuss the cause of your ascites and if you need any further treatment.  When to seek medical advice  Call your healthcare provider if you have any of the following after the procedure:  · A fever of 100.4°F (38.0°C) or higher  · Trouble breathing  · Pain that doesn't go away even after taking pain medicine  · Belly pain not caused by having the skin punctured  · Bleeding from the puncture site  · More than a small amount of fluid leaking from the puncture site  · Swollen belly  · Signs of infection at the puncture site. These include increased pain, redness, or swelling, warmth, or bad-smelling drainage.  · Blood in your  urine  · Feeling dizzy or lightheaded, or fainting   Date Last Reviewed: 7/1/2016  © 4780-7346 [employer]. [address] [place] [address], [place] [address]. All rights reserved. This information is not intended as a substitute for professional medical care. Always follow your healthcare professional's instructions.

## 2017-07-31 NOTE — PATIENT INSTRUCTIONS
[facility]  [address]  [phone] phone     [phone] fax  Hours of Operation: Monday- Friday 8:00am- 5:00pm  After hours phone  [phone]  Hematology / Oncology Physicians on call      Dr. Zane Thomson                      Please call with any concerns regarding your appointment today.

## 2017-08-15 NOTE — H&P
[facility] -   History & Physical    Subjective:      Chief Complaint/Reason for Admission: ascites    Lv Crocker is a 42 y.o. male.    Past Medical History:   Diagnosis Date    Arthritis     CAD (coronary artery disease)     Coronary artery disease    Cardiomyopathy     CHF (congestive heart failure)     Depression     Encounter for blood transfusion     GERD (gastroesophageal reflux disease)     Hashimoto's disease     HLD (hyperlipidemia) 6/11/2015    Hypoglycemia unawareness in type 1 diabetes mellitus     Hypothyroid     Myocardial infarction     Syncope 6/30/2015    Type I (juvenile type) diabetes mellitus with unspecified complication, uncontrolled     Diagnosis at 6 years old    Unspecified essential hypertension 5/29/2014     Past Surgical History:   Procedure Laterality Date    CARDIAC CATHETERIZATION      CARDIAC SURGERY      CHOLECYSTECTOMY      CORONARY ANGIOPLASTY      FOOT SPLIT TRANSFER OF THE POSTERIOR TIBIALIS TENDON PROCEDURE      HEART TRANSPLANT  2014    KNEE SURGERY      s/p oht  November 2014    stent placemnet       Family History   Problem Relation Age of Onset    Heart disease Mother     Kidney disease Mother     Diabetes Mother     Hypertension Mother     Kidney disease Father     Diabetes Father     Hypertension Father     Stroke Father     Heart disease Brother     Stroke Brother     Diabetes Brother     Cancer Brother 50     pancreatic    Vision loss Brother     Hypertension Brother     Diabetes Son     Diabetes Sister     Diabetes Maternal Uncle     Diabetes Paternal Aunt     Diabetes Maternal Grandmother     Diabetes Maternal Grandfather      Social History   Substance Use Topics    Smoking status: Never Smoker    Smokeless tobacco: Never Used    Alcohol use No         (Not in a hospital admission)  Review of patient's allergies indicates:   Allergen Reactions    No known drug allergies         Review of Systems    Constitutional: Negative.    Eyes: Negative.    Cardiovascular: Negative.    Gastrointestinal: Negative.        Objective:      Vital Signs (Most Recent)  Pulse: 90 (08/15/17 1400)  Resp: 18 (08/15/17 1400)  BP: 110/68 (08/15/17 1400)  SpO2: 100 % (08/15/17 1400)    Vital Signs Range (Last 24H):  Pulse:  []   Resp:  [17-18]   BP: (108-112)/(59-75)   SpO2:  [100 %]     Physical Exam   HENT:   Head: Normocephalic.   Eyes: Pupils are equal, round, and reactive to light.   Neck: Normal range of motion.   Pulmonary/Chest: Effort normal.   Abdominal: He exhibits distension.       Data Review:    CBC:   Lab Results   Component Value Date    WBC 5.49 08/15/2017    RBC 3.82 (L) 08/15/2017    HGB 11.9 (L) 08/15/2017    HCT 35.8 (L) 08/15/2017    HCT 24 (L) 11/21/2014     08/15/2017      ECG: no prior ECG.     Assessment:      There are no hospital problems to display for this patient.      Plan:    U/S guided paracentesis

## 2017-08-15 NOTE — DISCHARGE INSTRUCTIONS

## 2017-08-15 NOTE — PROGRESS NOTES
Procedure complete. Patient tolerated well. 2L chylous fluid removed. Bandaid placed to healthy puncture site, CDI, WNL.

## 2017-08-15 NOTE — DISCHARGE SUMMARY
Sterile technique was performed in the RLQ, lidocaine was used as a local anesthetic.  2 liters of chylous fluid removed for therapeutic purposes.  Pt tolerated the procedure well without immediate complications.  Please see radiologist report for details. F/u with PCP and/or ordering physician.

## 2017-08-21 NOTE — PROGRESS NOTES
Reason for visit: Chronic normocytic anemia    HPI:   The patient is a 42-year-old  male who presents to the clinic today for follow up to discuss further evaluation and management recommendations for chronic normocytic anemia.  The patient has had treatment of antibody mediated rejection with regard to his history of heart transplant.  During the course of that hospitalization he was also noted to be neutropenic and was also treated for C. difficile colitis.  Hem/Onc evaluated him and a bone marrow aspiration and biopsy was also performed.  He was given intermittent Neupogen injections for support with regard to his neutropenia.  The patient also had treatment for CMV infection and was treated with IV ganciclovir.  He was previously also on by mouth valganciclovir which has since been stopped.  He continues to be on Prograf, mycophenolate and prednisone for immunosuppression.    Today the patient reports that he continues on oral Demadex. He continues to have problems with volume status and abnormal kidney function.  The patient denies any fevers, chills or night sweats at this time.  He denies any melena, hematochezia, and emesis, hemoptysis or hematuria.  He denies any chest pain. He has been having paracentesis at periodic intervals  I have reviewed all of the details with regard to the patient's medical history available in Epic and have utilized this in my evaluation and management recommendations today.    PAST MEDICAL HISTORY:   1.  Type 1 diabetes mellitus  2.  Hypothyroidism due to Hashimoto's thyroiditis  3.  Chronic kidney disease stage III  4.  Restrictive cardiomyopathy  5.  CMV infection  6.  Chronic immunosuppression  7.  C. difficile colitis  8.  Osteopenia  9.  BPH  10. 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  Bilateral arthroscopy of the knees  2.  Right ankle fusion  3.  Cholecystectomy    FAMILY HISTORY: The patient's brother  from  complications related to pancreatic cancer at the age of 51.  He denies any other immediate family members with cancer or bleeding/clotting disorders.    SOCIAL HISTORY: He does not smoke cigarettes.  He does not drink alcohol.  He has never used any recreational drugs.  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  He lives in [place], [place].    ALLERGIES: [NKDA]    MEDICATIONS: [Medcard has been reviewed and/or reconciled.]    REVIEW OF SYSTEMS:   GENERAL: [No fevers, chills or sweats. Reports chronic fatigue. Denies weight loss or loss of appetite.]  HEENT: [No blurred vision, tinnitus, nasal discharge, sorethroat or dysphagia.]  HEART: [No chest pain, palpitations or shortness of breath.]    LUNGS: [No cough, hemoptysis or breathing problems.]  ABDOMEN: [No abdominal pain, nausea, vomiting, diarrhea, constipation or melena.]  GENITOURINARY: [No dysuria, bleeding or malodorous discharge.]  NEURO: [No headache, dizziness or vertigo.]  HEMATOLOGY: [No easy bruising, spontaneous bleeding or blood clots in the past].  MUSCULOSKELETAL: [No arthralgias, myalgias or bone pains.]  SKIN: [No rashes or skin lesions.]  PSYCHIATRY: [No depression or anxiety.]    PHYSICAL EXAMINATION:   VS: Reviewed on nurse's notes.  APPEARANCE: The patient is a chronically ill appearing and well-groomed  male who appears in no acute distress.  HEENT: No scleral icterus. Both external auditory canals clear. No oral ulcers, lesions. Throat clear  HEAD: No sinus tenderness.  NECK: Supple. No palpable lymphadenopathy. Thyroid non-tender, no palpable masses  CHEST: Decreased breath sounds in the base of right lung likely due to pleural effusion.  No rales. No rhonchi. Unlabored respirations.  CARDIOVASCULAR: Normal S1, S2. Normal rate. Regular rhythm.  ABDOMEN: Bowel sounds normal. No tenderness. No palpable hepatosplenomegaly. Mild abdominal distension.  LYMPHATIC: No palpable  supraclavicular, axillary nodes  EXTREMITIES: No clubbing, cyanosis.  Trace pitting edema of bilateral lower extremities.  SKIN: No lesions. No petechiae. No ecchymoses. No induration or nodules  NEUROLOGIC: No focal findings. Alert & Oriented x 3. Mood appropriate to affect    LABS:   Reviewed    IMAGING:  Reviewed    IMPRESSION:  1.  Chronic normocytic anemia  2.  Orthotopic heart transplant with chronic immunosuppression  3.  Iron deficiency anemia  4.  Anemia due to chronic kidney disease    PLAN:  1.  I had a detailed discussion with the patient today with regard to his current clinical situation.  The results of his bone marrow aspiration and biopsy from May 2015 were previously discussed in detail with the patient.  He did have decreased storage iron.  He has been given 2 doses of IV iron in the past and tolerated them well. He is on Procrit injections as needed for treatment of anemia due to chronic kidney disease. There was no definitive evidence of leukemia or lymphoma noted on the results of his bone marrow aspiration and biopsy.  Cytogenetics were normal.  2. Results of most recent CBC show Hb>10 gm/dl. Hold weekly procrit injections at this time.    Follow up in 4 weeks with labs. Possible procrit at follow up. He knows to call sooner for any new problems or questions.    Adolfo Robles MD

## 2017-08-24 NOTE — PROGRESS NOTES
Pt received 1 mL Hep A and B vaccine in left deltoid. Tolerated well. No redness, bruising, or swelling. Reports pain 0/10. Denies headache, dizziness, and nausea. Band aid applied. Monitored for 15 min. No reaction noted.

## 2017-09-01 NOTE — PROGRESS NOTES
"Subjective:      Patient ID: Lv Crocker is a 42 y.o. male.    Chief Complaint: Gastroesophageal Reflux      HPI  Mr. Crocker is a patient of Maribell Chase MD, who presents to establish primary care due to his specialists are at [facility] and due to GERD over the past 2 weeks. He reports his GERD has been chronic for years, but has had small volume emesis/reflux at night, which has woken him up from sleep. He has the sensation of the food being caught in his throat.. He reports avoiding caffeine. He had an EGD early this year to r/o celiac and H. Pylori.    Past Medical History:   Diagnosis Date    Arthritis     CAD (coronary artery disease)     H/o Coronary artery disease; resolved since heart transplant 2014    Cardiomyopathy     CHF (congestive heart failure)     Previously EF 20% (prior to heart transplant); last EF 55-60%; throught to be 2/2 genetics & DM1    Depression     Controlled "for the most part" on Lexipro    Encounter for blood transfusion     during transplant    GERD (gastroesophageal reflux disease)     Hashimoto's disease     HLD (hyperlipidemia) 6/11/2015    Hypoglycemia unawareness in type 1 diabetes mellitus     Hypothyroid     Initially hyperthyroid 2/2 Hoshimoto's thyroiditis; now on levothyroxine 150 mcg qd    Myocardial infarction     h/o MI x3 prior to heart transplant    Syncope 6/30/2015    Type I (juvenile type) diabetes mellitus with unspecified complication, uncontrolled     Diagnosis at 6 years old; on N insulin 20U BID & Humalog 10U with meals +SSI    Unspecified essential hypertension 5/29/2014     Past Surgical History:   Procedure Laterality Date    CARDIAC CATHETERIZATION      CARDIAC SURGERY      CHOLECYSTECTOMY      CORONARY ANGIOPLASTY      FOOT SPLIT TRANSFER OF THE POSTERIOR TIBIALIS TENDON PROCEDURE      HEART TRANSPLANT  2014    KNEE SURGERY      s/p oht  November 2014    stent placemnet       Social History     Social History    " Marital status:      Spouse name: N/A    Number of children: N/A    Years of education: N/A     Occupational History    Not on file.     Social History Main Topics    Smoking status: Never Smoker    Smokeless tobacco: Never Used    Alcohol use No    Drug use: No    Sexual activity: Yes     Partners: Female     Other Topics Concern    Not on file     Social History Narrative            Breakfast: Skips 80%; cereal; Diet Sprite    Lunch: Turkey or chicken sandwich on white bread; Diet Sprite    Dinner: Grilled burgers, small amount chips; Diet Sprite    Snacks: Ronny crackers before bed on most nights    Eats out: 2x/wk    Water: 0    PA: Walks 15 minutes (strenuous) daily    Goal weight 170-175 lbs by 1/2018     Family History   Problem Relation Age of Onset    Heart disease Mother     Kidney disease Mother     Diabetes Mother     Hypertension Mother     Kidney disease Father     Diabetes Father     Hypertension Father     Stroke Father     Heart disease Brother     Stroke Brother     Diabetes Brother     Cancer Brother 50     pancreatic    Vision loss Brother     Hypertension Brother     Diabetes Son     Diabetes Sister     Diabetes Maternal Uncle     Diabetes Paternal Aunt     Diabetes Maternal Grandmother     Diabetes Maternal Grandfather        Current Outpatient Prescriptions:     alendronate (FOSAMAX) 70 MG tablet, , Disp: , Rfl:     aspirin (ECOTRIN) 81 MG EC tablet, Take 1 tablet (81 mg total) by mouth once daily., Disp: 30 tablet, Rfl: 11    escitalopram oxalate (LEXAPRO) 20 MG tablet, Take 1 tablet (20 mg total) by mouth once daily., Disp: 30 tablet, Rfl: 11    fluticasone (FLONASE) 50 mcg/actuation nasal spray, 2 sprays by Each Nare route once daily., Disp: 1 Bottle, Rfl: 12    gabapentin (NEURONTIN) 300 MG capsule, Take 3 capsules (900 mg total) by mouth 3 (three) times daily., Disp: 270 capsule, Rfl: 11    insulin detemir (LEVEMIR FLEXTOUCH) 100 unit/mL  "(3 mL) SubQ InPn pen, 14 units bid, Disp: 15 mL, Rfl: 12    insulin lispro (HUMALOG KWIKPEN) 100 unit/mL InPn pen, 8 units AC + correction Max TDD 40 units, Disp: 15 mL, Rfl: 12    lancets Misc, 1 Device by Misc.(Non-Drug; Combo Route) route 4 (four) times daily with meals and nightly., Disp: 100 each, Rfl: 5    levothyroxine (SYNTHROID) 150 MCG tablet, Take 1 tablet (150 mcg total) by mouth every morning., Disp: 30 tablet, Rfl: 12    magnesium oxide (MAG-OX) 400 mg tablet, Take 1 tablet (400 mg total) by mouth once daily., Disp: 30 tablet, Rfl: 11    mycophenolate (MYFORTIC) 180 MG TbEC, Take 4 tablets (720 mg total) by mouth 2 (two) times daily. S/P Heart Transplant 11/18/2014 ICD-10 Z94.1, Disp: 240 tablet, Rfl: 11    nortriptyline (PAMELOR) 25 MG capsule, Take 1 capsule (25 mg total) by mouth every evening., Disp: 30 capsule, Rfl: 3    ondansetron (ZOFRAN-ODT) 4 MG TbDL, Take 2 tablets (8 mg total) by mouth every 8 (eight) hours as needed (nausea)., Disp: 15 tablet, Rfl: 0    pantoprazole (PROTONIX) 40 MG tablet, TAKE ONE TABLET BY MOUTH EVERY DAY, Disp: 30 tablet, Rfl: 11    pen needle, diabetic 32 gauge x 5/32" Ndle, Uses 5 pen needles a day, Disp: 200 each, Rfl: 12    pravastatin (PRAVACHOL) 40 MG tablet, Take 1 tablet (40 mg total) by mouth every evening., Disp: 30 tablet, Rfl: 11    predniSONE (DELTASONE) 2.5 MG tablet, Take 1 tablet (2.5 mg total) by mouth once daily. S/P Heart Transplant 11/18/2014 ICD-10 Z94.1, Disp: 30 tablet, Rfl: 11    tacrolimus (PROGRAF) 1 MG Cap, Taked 2mg orally in the morning and 3mg orally in the evening.  S/p heart transplant  11/18/2014  ICD-10 Z94.1, Disp: 150 capsule, Rfl: 11    tamsulosin (FLOMAX) 0.4 mg Cp24, TAKE 2 CAPSULES(0.8 MG) BY MOUTH EVERY EVENING, Disp: 60 capsule, Rfl: 1    torsemide (DEMADEX) 20 MG Tab, Take 2 tablets (40 mg total) by mouth once daily., Disp: 60 tablet, Rfl: 11    Review of patient's allergies indicates:   Allergen Reactions    No " "known drug allergies      Review of Systems   Constitutional: Negative.   Cardiovascular: Negative.   Respiratory: Negative.   Gastrointestinal: Negative.   Genitourinary: Negative.   Musculoskeletal: Negative.   Derm: Negative.  Allergic/Immunologic: Negative.   Endocrine: Negative.   Hematological: Negative.   Neurological: Negative.   Psychiatric/Behavioral: Negative.     Objective:     /60 (BP Location: Left arm, Patient Position: Sitting, BP Method: Medium (Manual))   Pulse 108   Temp 98.7 °F (37.1 °C) (Tympanic)   Resp 20   Ht 5' 7" (1.702 m)   Wt 87.2 kg (192 lb 3.9 oz)   SpO2 (!) 93%   BMI 30.11 kg/m²     Physical Exam  GEN: A&O fully, NAD  PSYC: Normal affect  HEENT: OP: Clear, no LAD, no thyroid masses  CV: RRR, no M/G/R  PULM: CTA bilaterally, no wheezes, rales  GI: S, mildly distended 2/2 fluid/NT, normal bowel sounds  EXT: No C/C/E  NEURO: CN II-XII intact, 5/5 strength globally, no sensory losses, normal tandem gait, normal Romberg, 2+ DTRs globally    LABS:  Lab Results   Component Value Date    WBC 6.61 08/21/2017    HGB 11.9 (L) 08/21/2017    HCT 37.3 (L) 08/21/2017     08/21/2017    CHOL 154 04/05/2017    TRIG 103 04/05/2017    HDL 31 (L) 04/05/2017    LDLCALC 102.4 04/05/2017    ALT 17 06/29/2017    AST 24 06/29/2017     06/29/2017    K 5.2 (H) 06/29/2017     06/29/2017    CREATININE 2.3 (H) 06/29/2017    BUN 32 (H) 06/29/2017    CO2 27 06/29/2017    TSH 0.769 01/23/2017    PSA 0.59 12/06/2012    INR 1.1 08/15/2017    HGBA1C 7.2 (H) 04/05/2017    ESTGFRAFRICA 39 (A) 06/29/2017    EGFRNONAA 34 (A) 06/29/2017    CRP 26.1 (H) 05/31/2016       PATH 3/13/17:  FINAL PATHOLOGIC DIAGNOSIS  1  Small bowel biopsy. Rule out celiac disease:  Normal small bowel mucosa.  2  Gastric biopsy. Rule out H. pylori:  Gastric mucosa without significant histologic alteration;  H. pylori is not identified on H&E-stained sections.  3  Gastric polyps:  Fundic gland polyps.      Assessment: "   1. GERD: Likely 2/2 prednisone, diet, etc. Taking PPI 40 qd. Encouaraged dietary modifications,        Including increasing water to 1/2 body wt I.e. 90 oz/day instead of Sprite and exchanging        white bread with whole wheat or whole fat, organic yogurt.   2.  HM: Due for PCV13, which he prefers to do next mo with his flu vax.  3.  CKD, stage III: Likely partially 2/2 chronic dehydration given high BUN. Will check labs on next visit.     RTC in 4 weeks RE GERD, HM or sooner as needed

## 2017-09-15 NOTE — DISCHARGE SUMMARY
Sterile technique was performed in the LLQ, lidocaine was used as a local anesthetic.  2.5 liters of pink chylous fluid removed for therapeutic purposes.  Pt tolerated the procedure well without immediate complications.  Please see radiologist report for details. F/u with PCP and/or ordering physician.

## 2017-09-15 NOTE — DISCHARGE INSTRUCTIONS

## 2017-09-15 NOTE — PLAN OF CARE
Problem: Paracentesis, Abdominal (Adult)  Goal: Signs and Symptoms of Listed Potential Problems Will be Absent, Minimized or Managed (Paracentesis, Abdominal)  Signs and symptoms of listed potential problems will be absent, minimized or managed by discharge/transition of care (reference Paracentesis, Abdominal (Adult) CPG).   Outcome: Outcome(s) achieved Date Met: 09/15/17  2.5liters of pink chylous fluid removed from LLQ abdomen.  Pt d/c home in stable condition via wheelchair with ride.  Verbalized understanding of d/c instructions.  Pt voiced no complaints at this time. Pt stood at side of bed, walked steps with no new motor or sensory deficits.  Neurologically intact.

## 2017-09-18 NOTE — PROGRESS NOTES
Reason for visit: Chronic normocytic anemia    HPI:   The patient is a 42-year-old  male who presents to the clinic today for follow up to discuss further evaluation and management recommendations for chronic normocytic anemia.  The patient has had treatment of antibody mediated rejection with regard to his history of heart transplant.  During the course of that hospitalization he was also noted to be neutropenic and was also treated for C. difficile colitis.  Hem/Onc evaluated him and a bone marrow aspiration and biopsy was also performed.  He was given intermittent Neupogen injections for support with regard to his neutropenia.  The patient also had treatment for CMV infection and was treated with IV ganciclovir.  He was previously also on by mouth valganciclovir which has since been stopped.  He continues to be on Prograf, mycophenolate and prednisone for immunosuppression.    Today the patient reports that he continues on oral Demadex. He continues to have problems with volume status and abnormal kidney function.  The patient denies any fevers, chills or night sweats at this time.  He denies any melena, hematochezia, and emesis, hemoptysis or hematuria.  He denies any chest pain. He has been having paracentesis at periodic intervals  I have reviewed all of the details with regard to the patient's medical history available in Epic and have utilized this in my evaluation and management recommendations today.    PAST MEDICAL HISTORY:   1.  Type 1 diabetes mellitus  2.  Hypothyroidism due to Hashimoto's thyroiditis  3.  Chronic kidney disease stage III  4.  Restrictive cardiomyopathy  5.  CMV infection  6.  Chronic immunosuppression  7.  C. difficile colitis  8.  Osteopenia  9.  BPH  10. 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  Bilateral arthroscopy of the knees  2.  Right ankle fusion  3.  Cholecystectomy    FAMILY HISTORY: The patient's brother  from  complications related to pancreatic cancer at the age of 51.  He denies any other immediate family members with cancer or bleeding/clotting disorders.    SOCIAL HISTORY: He does not smoke cigarettes.  He does not drink alcohol.  He has never used any recreational drugs.  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  He lives in [place], [place].    ALLERGIES: [NKDA]    MEDICATIONS: [Medcard has been reviewed and/or reconciled.]    REVIEW OF SYSTEMS:   GENERAL: [No fevers, chills or sweats. Reports chronic fatigue. Denies weight loss or loss of appetite.]  HEENT: [No blurred vision, tinnitus, nasal discharge, sorethroat or dysphagia.]  HEART: [No chest pain, palpitations or shortness of breath.]    LUNGS: [No cough, hemoptysis or breathing problems.]  ABDOMEN: [No abdominal pain, nausea, vomiting, diarrhea, constipation or melena.]  GENITOURINARY: [No dysuria, bleeding or malodorous discharge.]  NEURO: [No headache, dizziness or vertigo.]  HEMATOLOGY: [No easy bruising, spontaneous bleeding or blood clots in the past].  MUSCULOSKELETAL: [No arthralgias, myalgias or bone pains.]  SKIN: [No rashes or skin lesions.]  PSYCHIATRY: [No depression or anxiety.]    PHYSICAL EXAMINATION:   VS: Reviewed on nurse's notes.  APPEARANCE: The patient is a chronically ill appearing and well-groomed  male who appears in no acute distress.  HEENT: No scleral icterus. Both external auditory canals clear. No oral ulcers, lesions. Throat clear  HEAD: No sinus tenderness.  NECK: Supple. No palpable lymphadenopathy. Thyroid non-tender, no palpable masses  CHEST: Decreased breath sounds in the base of right lung likely due to pleural effusion.  No rales. No rhonchi. Unlabored respirations.  CARDIOVASCULAR: Normal S1, S2. Normal rate. Regular rhythm.  ABDOMEN: Bowel sounds normal. No tenderness. No palpable hepatosplenomegaly. Mild abdominal distension.  LYMPHATIC: No palpable  supraclavicular, axillary nodes  EXTREMITIES: No clubbing, cyanosis.  Trace pitting edema of bilateral lower extremities.  SKIN: No lesions. No petechiae. No ecchymoses. No induration or nodules  NEUROLOGIC: No focal findings. Alert & Oriented x 3. Mood appropriate to affect    LABS:   Reviewed    IMAGING:  Reviewed    IMPRESSION:  1.  Chronic normocytic anemia  2.  Orthotopic heart transplant with chronic immunosuppression  3.  Iron deficiency anemia  4.  Anemia due to chronic kidney disease    PLAN:  1.  I had a detailed discussion with the patient today with regard to his current clinical situation.  The results of his bone marrow aspiration and biopsy from May 2015 were previously discussed in detail with the patient.  He did have decreased storage iron.  He has been given 2 doses of IV iron in the past and tolerated them well. He is on Procrit injections as needed for treatment of anemia due to chronic kidney disease. There was no definitive evidence of leukemia or lymphoma noted on the results of his bone marrow aspiration and biopsy.  Cytogenetics were normal.  2. Results of most recent CBC show Hb>10 gm/dl. Hold weekly procrit injections at this time.    Follow up in 6 weeks with labs. Possible procrit at follow up. He knows to call sooner for any new problems or questions.    Adolfo Robles MD

## 2017-09-26 NOTE — TELEPHONE ENCOUNTER
----- Message from Maribell Nunes MA sent at 9/26/2017  9:48 AM CDT -----  Contact: SELF       ----- Message -----  From: Candie Faith  Sent: 9/26/2017   8:49 AM  To: [facility] MALIK Staff    Patient would like to reschedule nurse appointment . Please call back at [phone].      Thanks,  Candie Faith

## 2017-09-27 NOTE — TELEPHONE ENCOUNTER
Spoke with pt regarding scheduling labs on Oct 2 in [place]. Pt gave ok to schedule fasting labs in [place] n Oct 2. Also, advised pt I needed to schedule and EKG and DSE on Nov 17 when he sees Dr. Hernández pt stated it was ok to schedule those tests. Pt asked for me to call him back once tests were scheduled.

## 2017-10-02 NOTE — PROGRESS NOTES
"Subjective:      Patient ID: Lv Crocker is a 42 y.o. male.    Chief Complaint: Follow-up (GERD )      HPI  Mr. Cheung is a patient of mine, who presents for f/u on GERD. He reports his GERD is improved. He reports taking omeprazole, which helps. He continues to take prednisone 2.5 mg qd. No s/s heart tpx rejection since his 1 episode 6 months following tpx (2.5 yrs ago).     [facility] reviewed, which reveals recent f/u with Hem/onc, who noted no leukemia/lymphoma on BM bx & normal cytogenetics. No procrit inj given due to Hct >30 at that time.     He reports 15 lb weight increase, which he attributes to fluid retention, which he is scheduled for paracentesis on 10/4/17. No abn pain. He reports having therapeutic paracentesis ~1x/mo.    Past Medical History:   Diagnosis Date    Arthritis     CAD (coronary artery disease)     H/o Coronary artery disease; resolved since heart transplant 2014    Cardiomyopathy     CHF (congestive heart failure)     Previously EF 20% (prior to heart transplant); last EF 55-60%; throught to be 2/2 genetics & DM1    Depression     Controlled "for the most part" on Lexipro    Encounter for blood transfusion     during transplant    GERD (gastroesophageal reflux disease)     Hashimoto's disease     HLD (hyperlipidemia) 6/11/2015    Hypoglycemia unawareness in type 1 diabetes mellitus     Hypothyroid     Initially hyperthyroid 2/2 Hoshimoto's thyroiditis; now on levothyroxine 150 mcg qd    Myocardial infarction     h/o MI x3 prior to heart transplant    Syncope 6/30/2015    Type I (juvenile type) diabetes mellitus with unspecified complication, uncontrolled     Diagnosis at 6 years old; on N insulin 20U BID & Humalog 10U with meals +SSI    Unspecified essential hypertension 5/29/2014     Past Surgical History:   Procedure Laterality Date    CARDIAC CATHETERIZATION      CARDIAC SURGERY      CHOLECYSTECTOMY      CORONARY ANGIOPLASTY      FOOT SPLIT TRANSFER OF THE " POSTERIOR TIBIALIS TENDON PROCEDURE      HEART TRANSPLANT  2014    KNEE SURGERY      s/p oht  November 2014    stent placemnet       Social History     Social History    Marital status:      Spouse name: N/A    Number of children: N/A    Years of education: N/A     Occupational History    Not on file.     Social History Main Topics    Smoking status: Never Smoker    Smokeless tobacco: Never Used    Alcohol use No    Drug use: No    Sexual activity: Yes     Partners: Female     Other Topics Concern    Not on file     Social History Narrative            Breakfast: Skips 80%; cereal; Diet Sprite    Lunch: Turkey or chicken sandwich on white bread; Diet Sprite    Dinner: Grilled burgers, small amount chips; Diet Sprite    Snacks: Ronny crackers before bed on most nights    Eats out: 2x/wk    Water: 0    PA: Walks 15 minutes (strenuous) daily    Goal weight 170-175 lbs by 1/2018     Family History   Problem Relation Age of Onset    Heart disease Mother     Kidney disease Mother     Diabetes Mother     Hypertension Mother     Kidney disease Father     Diabetes Father     Hypertension Father     Stroke Father     Heart disease Brother     Stroke Brother     Diabetes Brother     Cancer Brother 50     pancreatic    Vision loss Brother     Hypertension Brother     Diabetes Son     Diabetes Sister     Diabetes Maternal Uncle     Diabetes Paternal Aunt     Diabetes Maternal Grandmother     Diabetes Maternal Grandfather        Current Outpatient Prescriptions:     aspirin (ECOTRIN) 81 MG EC tablet, Take 1 tablet (81 mg total) by mouth once daily., Disp: 30 tablet, Rfl: 11    escitalopram oxalate (LEXAPRO) 20 MG tablet, Take 1 tablet (20 mg total) by mouth once daily., Disp: 30 tablet, Rfl: 11    fluticasone (FLONASE) 50 mcg/actuation nasal spray, 2 sprays by Each Nare route once daily., Disp: 1 Bottle, Rfl: 12    gabapentin (NEURONTIN) 300 MG capsule, Take 3 capsules (900 mg  "total) by mouth 3 (three) times daily., Disp: 270 capsule, Rfl: 11    insulin detemir (LEVEMIR FLEXTOUCH) 100 unit/mL (3 mL) SubQ InPn pen, 14 units bid, Disp: 15 mL, Rfl: 12    insulin lispro (HUMALOG KWIKPEN) 100 unit/mL InPn pen, 8 units AC + correction Max TDD 40 units, Disp: 15 mL, Rfl: 12    lancets Misc, 1 Device by Misc.(Non-Drug; Combo Route) route 4 (four) times daily with meals and nightly., Disp: 100 each, Rfl: 5    levothyroxine (SYNTHROID) 150 MCG tablet, Take 1 tablet (150 mcg total) by mouth every morning., Disp: 30 tablet, Rfl: 12    magnesium oxide (MAG-OX) 400 mg tablet, Take 1 tablet (400 mg total) by mouth once daily., Disp: 30 tablet, Rfl: 11    mycophenolate (MYFORTIC) 180 MG TbEC, Take 4 tablets (720 mg total) by mouth 2 (two) times daily. S/P Heart Transplant 11/18/2014 ICD-10 Z94.1, Disp: 240 tablet, Rfl: 11    nortriptyline (PAMELOR) 25 MG capsule, Take 1 capsule (25 mg total) by mouth every evening., Disp: 30 capsule, Rfl: 3    ondansetron (ZOFRAN-ODT) 4 MG TbDL, Take 2 tablets (8 mg total) by mouth every 8 (eight) hours as needed (nausea)., Disp: 15 tablet, Rfl: 0    pantoprazole (PROTONIX) 40 MG tablet, TAKE ONE TABLET BY MOUTH EVERY DAY, Disp: 30 tablet, Rfl: 11    pen needle, diabetic 32 gauge x 5/32" Ndle, Uses 5 pen needles a day, Disp: 200 each, Rfl: 12    pravastatin (PRAVACHOL) 40 MG tablet, Take 1 tablet (40 mg total) by mouth every evening., Disp: 30 tablet, Rfl: 11    predniSONE (DELTASONE) 2.5 MG tablet, Take 1 tablet (2.5 mg total) by mouth once daily. S/P Heart Transplant 11/18/2014 ICD-10 Z94.1, Disp: 30 tablet, Rfl: 11    tacrolimus (PROGRAF) 1 MG Cap, Taked 2mg orally in the morning and 3mg orally in the evening.  S/p heart transplant  11/18/2014  ICD-10 Z94.1, Disp: 150 capsule, Rfl: 11    tamsulosin (FLOMAX) 0.4 mg Cp24, TAKE 2 CAPSULES(0.8 MG) BY MOUTH EVERY EVENING, Disp: 60 capsule, Rfl: 1    torsemide (DEMADEX) 20 MG Tab, Take 2 tablets (40 mg total) " "by mouth once daily., Disp: 60 tablet, Rfl: 11    Review of patient's allergies indicates:   Allergen Reactions    No known drug allergies      Review of Systems   Constitutional: Negative.   Cardiovascular: Negative.   Respiratory: Negative.   Gastrointestinal: Negative.   Genitourinary: Negative.   Musculoskeletal: Negative.   Derm: Negative.  Allergic/Immunologic: Negative.   Endocrine: Negative.   Hematological: Negative.   Neurological: Negative.   Psychiatric/Behavioral: Negative.     Objective:     /62 (BP Location: Left arm, Patient Position: Sitting, BP Method: Large (Automatic))   Pulse 102   Temp 97.5 °F (36.4 °C) (Tympanic)   Ht 5' 7" (1.702 m)   Wt 90 kg (198 lb 6.6 oz)   SpO2 98%   BMI 31.08 kg/m²     Physical Exam  GEN: A&O fully, NAD  PSYC: Normal affect  GI: NT/+ND, normal bowel sounds    Lab Results   Component Value Date    WBC 6.72 09/18/2017    HGB 11.1 (L) 09/18/2017    HCT 32.9 (L) 09/18/2017     09/18/2017    CHOL 154 04/05/2017    TRIG 103 04/05/2017    HDL 31 (L) 04/05/2017    ALT 15 09/18/2017    AST 24 09/18/2017     09/18/2017    K 4.1 09/18/2017     09/18/2017    CREATININE 2.4 (H) 09/18/2017    BUN 42 (H) 09/18/2017    CO2 27 09/18/2017    TSH 0.769 01/23/2017    PSA 0.59 12/06/2012    INR 1.1 08/15/2017    HGBA1C 7.2 (H) 04/05/2017       Assessment:   1. Ascites: Likely 2/2 renal causes. Not thought to be 2/2 hepatic or cardiac etiologies. Last EF 55%.    2. GERD: Well controlled. He reports decreased portion sizes and fried foods.   I recommended he avoid:       a. Tomato sauces i.e. Spaghetti, pizza, lasagna, etc.       b. Large meals/serving sizes       c. Caffeine i.e. Coffee, chocolate, etc.       d. Spicy foods       e. Alcoholic beverages    3. S/p cardiac transplant: On immunosupression. Risks and benefits discussed and patient chose   to move forward with flu vax.    4. DM: Likely 2/2 chronic prednisone 2.5 mg po qd. He has a standing order for " a1c by Dr. Ferreira.     RTC in 3 months RE GERD or sooner as needed

## 2017-10-04 NOTE — DISCHARGE SUMMARY
Sterile technique was performed in the RLQ, lidocaine was used as a local anesthetic.  3250 ccs of pink chylous fluid removed for therapeutic purposes.  Pt tolerated the procedure well without immediate complications.  Please see radiologist report for details. F/u with PCP and/or ordering physician.

## 2017-10-04 NOTE — PLAN OF CARE
Problem: Paracentesis, Abdominal (Adult)  Goal: Signs and Symptoms of Listed Potential Problems Will be Absent, Minimized or Managed (Paracentesis, Abdominal)  Signs and symptoms of listed potential problems will be absent, minimized or managed by discharge/transition of care (reference Paracentesis, Abdominal (Adult) CPG).   Outcome: Outcome(s) achieved Date Met: 10/04/17  Patient discharged home in stable condition via wheelchair with ride. Verbalized understanding of discharge instructions. Patient voiced no complaints at this time. Patient stood at side of bed, walked steps with no new motor or sensory deficits. Neurologically intact.   Band-aid in place to healthy site.

## 2017-10-04 NOTE — PROGRESS NOTES
Paracentesis complete. Pt tolerated well. Denies discomfort. Band-aid in place to healthy site. 3250ml chylous fluid removed and discarded. VSS.

## 2017-10-04 NOTE — DISCHARGE INSTRUCTIONS

## 2017-10-06 NOTE — PROGRESS NOTES
Received [facility] physical annual report of disaling condition form for pt. Sent form to Medical records to be completed.

## 2017-10-09 NOTE — PROGRESS NOTES
Pt received 1 mL Hep A and B vaccine in right deltoid. Tolerated well. No redness, swelling, or bruising. Reports pain 1/10. Denies headache, dizziness, or nausea. Band aid applied. Monitored for 15 min. No reaction noted.

## 2017-10-09 NOTE — TELEPHONE ENCOUNTER
Called pt to go over lab results. All labs are stable, no DSA or C1Q. Pt reports his Cr is normally in the 2's. He is followed by nephrology for his kidney functions. Asked if pt needed anything, he stated he called in a refill for Prilosec to the pharmacy on Friday, but needs nothing else at this time. I informed pt of his next appt with HTS in November and would see him then.

## 2017-10-26 NOTE — TELEPHONE ENCOUNTER
Spoke with pt, he stated he sent the form to [facility] to his cardiologist to have completed. He stated he would send it to us if cardiologist does not fill out.

## 2017-10-26 NOTE — TELEPHONE ENCOUNTER
----- Message from Palmira Streeter sent at 10/26/2017  8:59 AM CDT -----  Contact: Elizabeth/wife  Elizabeth called and stated that some paperwork for his annual disability was sent to the office and it needs to be filled out so he can get paid on Nov 1. Please call him at [phone]. He would like to pick it up today.    Thanks,  Palmira

## 2017-10-27 NOTE — LETTER
October 31, 2017        Placido Tobias  [address] [place] [address]  Phone: [phone]  Fax: [phone]             [facility] [address] [place] [address]  Phone: [phone]   Patient: Lv Crocker   MR Number: [id number]   YOB: 1975   Date of Visit: 10/27/2017       Dear Dr. Placido Tobias    Thank you for referring Lv Crocker to me for evaluation. Attached you will find relevant portions of my assessment and plan of care.    If you have questions, please do not hesitate to call me. I look forward to following Lv Crocker along with you.    Sincerely,    Kanika Ferreira MD    Enclosure    If you would like to receive this communication electronically, please contact [email or web address] or [phone] to request [employer] access.    [employer] is a tool which provides read-only access to select patient information with whom you have a relationship. Its easy to use and provides real time access to review your patients record including encounter summaries, notes, results, and demographic information.    If you feel you have received this communication in error or would no longer like to receive these types of communications, please e-mail [email or web address]
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
## 2017-11-01 NOTE — TELEPHONE ENCOUNTER
Chart Review discussed pt's 3 yr annual in November. Given the pt has high creatinine, no LHC will be performed. Ordered DSE in November.

## 2017-11-03 NOTE — DISCHARGE SUMMARY
Sterile technique was performed in the RLQ, lidocaine was used as a local anesthetic.  3 liters of reddish chylous fluid removed for therapeutic purposes.  Pt tolerated the procedure well without immediate complications.  Please see radiologist report for details. F/u with PCP and/or ordering physician.

## 2017-11-03 NOTE — H&P (VIEW-ONLY)
Subjective:   Mr. Crocker is a 42 y.o. year old White male who received a  - brain death heart transplant on 14.      CMV status:   Donor: -  Recipient: +    HPI  Lv Crocker is a very pleasant  40 yo s/p OHTX , complicated by AMR, treated 04/15 with 5 days PP, IVIG x 2 doses, was scheduled for Rituximab on 5/1/15 but developed pancytopenia in 2015 who comes in for a clinic visit.     Recent angiogram with IVUS showed no evidence of CAV. Comes today for a follow-up visit- was admitted - for volume overload- He did not diurese well initially and his renal function worsened. IR was consulted and paracentesis was performed in which >4L was removed. His renal function improved slightly after paracentesis and he felt much better. Endo was also following secondary to hyperglycemia. He was on an insulin gtt and eventually transitioned to Novolog/Levemir. His renal function plateaued(Cr 2.8) and he was discharged home on Torsemide 40mg daily.      Here for follow up. Problem is ascites and liver disease awaiting liver biopsy    Current immunosuppression: pred 2.5 mg daily, myfortic 720mg bid, prograf 2mg/3mg    TTE today   1 - Post-cardiac transplantation study.     2 - Normal left ventricular systolic function (EF 55-60%).     3 - Low normal right ventricular systolic function .     4 - Trivial to mild tricuspid regurgitation.     5 - Trivial to mild pulmonic regurgitation.     6 - Trivial pericardial effusion.     7 - The estimated PA systolic pressure is 37 mmHg.     RHC/Bx 16  Grade 0 cellular and pAMR  RHC:  AOSAT: 97  FICKCI: 2.92  FICKCO: 5.75  PAPRES:  (30)  PASAT: 61  PVR: 0.7  PWPRES:  (26)  RAPRES:  (18)  RVPRES: 39/18, 18:  PWSAT: 98    Immunosupression: tacro 4mg bid  Myfortic 360mg bid, pred 2.5     Review of Systems   Constitution: Negative. Negative for chills, decreased appetite, diaphoresis, fever, weakness, malaise/fatigue, night sweats, weight  "gain and weight loss.   HENT: Negative.    Eyes: Negative.    Cardiovascular: Negative for chest pain, claudication, cyanosis, dyspnea on exertion, irregular heartbeat, leg swelling, near-syncope, orthopnea, palpitations, paroxysmal nocturnal dyspnea and syncope.   Respiratory: Negative for cough, hemoptysis and shortness of breath.    Endocrine: Negative.    Hematologic/Lymphatic: Negative.    Skin: Negative.  Negative for color change, dry skin and nail changes.   Musculoskeletal: Negative.    Gastrointestinal: Positive for bloating. Negative for abdominal pain and change in bowel habit.   Genitourinary: Negative.    Neurological: Negative for dizziness and light-headedness.   Psychiatric/Behavioral: Negative for depression.       Objective:   Blood pressure 125/72, pulse 107, height 5' 7" (1.702 m), weight 89.5 kg (197 lb 5 oz).body mass index is 30.9 kg/m².    Physical Exam   Constitutional: He appears well-developed.        HENT:   Head: Normocephalic.   Neck: No JVD present. Carotid bruit is not present.   Cardiovascular: Regular rhythm, normal heart sounds and normal pulses.  Tachycardia present.  PMI is not displaced.  Exam reveals no gallop and no S3.    No murmur heard.  tachy   Pulmonary/Chest: Effort normal and breath sounds normal. No respiratory distress. He has no wheezes.   Abdominal: Soft. Bowel sounds are normal. He exhibits distension and ascites. There is no tenderness. There is no rebound.   Musculoskeletal: He exhibits no edema.   Neurological: He is alert.   Skin: Skin is warm.   Vitals reviewed.      Chemistry        Component Value Date/Time     10/04/2017 1358    K 4.5 10/04/2017 1358     10/04/2017 1358    CO2 27 10/04/2017 1358    BUN 31 (H) 10/04/2017 1358    CREATININE 2.5 (H) 10/04/2017 1358     (H) 10/04/2017 1358        Component Value Date/Time    CALCIUM 9.4 10/04/2017 1358    ALKPHOS 223 (H) 10/02/2017 0829    AST 26 10/02/2017 0829    ALT 16 10/02/2017 0829    " BILITOT 0.7 10/02/2017 0829            Magnesium   Date Value Ref Range Status   10/02/2017 1.7 1.6 - 2.6 mg/dL Final       Lab Results   Component Value Date    WBC 5.42 10/04/2017    HGB 10.2 (L) 10/04/2017    HCT 30.6 (L) 10/04/2017    MCV 91 10/04/2017     10/04/2017       Lab Results   Component Value Date    INR 1.1 10/04/2017    INR 1.1 08/15/2017    INR 1.1 06/29/2017       BNP   Date Value Ref Range Status   10/02/2017 180 (H) 0 - 99 pg/mL Final     Comment:     Values of less than 100 pg/ml are consistent with non-CHF populations.   09/18/2017 165 (H) 0 - 99 pg/mL Final     Comment:     Values of less than 100 pg/ml are consistent with non-CHF populations.   04/05/2017 192 (H) 0 - 99 pg/mL Final     Comment:     Values of less than 100 pg/ml are consistent with non-CHF populations.       LD   Date Value Ref Range Status   04/22/2015 397 (H) 110 - 260 U/L Final     Comment:     Results are increased in hemolyzed samples.   12/06/2012 211 110 - 260 U/L Final     Comment:     Results are increased in hemolyzed samples.             Tacrolimus Lvl   Date Value Ref Range Status   10/02/2017 8.4 5.0 - 15.0 ng/mL Final     Comment:     Testing performed by Liquid Chromatography-Tandem  Mass Spectrometry (LC-MS/MS).  This test was developed and its performance characteristics  determined by [facility], Department of Pathology  and Laboratory Medicine in a manner consistent with CLIA  requirements. It has not been cleared or approved by the [place]  Food and Drug Administration.  This test is used for clinical   purposes.  It should not be regarded as investigational or for  research.       No results found for: SIROLIMUS  No results found for: CYCLOSPORINE    No results found for this or any previous visit.  No results found for this or any previous visit.    Labs were reviewed with the patient.    Assessment:     1. Heart transplanted- echo today actually looks pretty good- EF 50-55%, with normal  diastolic fxn- has evidence of pleural and pericardial effusions which are stable in size   2. Heart transplant rejection    3. Essential hypertension    4. 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 Ischemic cardiomyopathy    6. Hyperlipidemia    7. Coronary artery disease involving native coronary artery without angina pectoris, unspecified whether native or transplanted heart    8. Chronic kidney disease (CKD), stage III (moderate)    9. 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 Immunosuppression        Plan:   Continue current regimen- will set up outpt paracentesis and reorder labs as there was a scheduling mixup    Awaiting liver biopsy. Reassess  Return instructions as set forth by post transplant schedule or as needed:    Clinic: Return for labs and/or biopsy weekly the first month, every two weeks during month 2 and then monthly for the first year at the provider or coordinator's discretion. During the second year, return to clinic every 3 months. Post transplant year 3-5 return every 6 months. There will be a comprehensive post transplant evaluation every year that may include LHC/RHC/biopsy, stress test, echo, CXR, and other health screening exams.    In addition to the clinical assessment, I have ordered Allomap testing for this patient to assist in identification of moderate/severe acute cellular rejection (ACR) in a pt with stable Allograft function instead of endomyocardial biopsy.     Patient is reminded to call with any health changes as these can be early signs of transplant complications. Patient is advised to make sure any new medications or changes of old medications are discussed with a pharmacist or physician knowledgeable with transplant to avoid rejection/drug toxicity related to significant drug interactions.    UNOS Patient Status  Functional Status: 100% - Normal, no complaints, no evidence of disease  Physical Capacity: No  Limitations  Working for Income: Unknown    Kanika Ferreira MD

## 2017-11-03 NOTE — DISCHARGE INSTRUCTIONS

## 2017-11-03 NOTE — PLAN OF CARE
3L of reddish chylous fluid removed for therapeutic purposes from rt abdomen. bandaid in place to healthy site. Pt d/c'ed to home with spouse. Pt voiced no concerns at this time; no pain or discomfort. Verbalized understanding of d/c instructions

## 2017-11-09 NOTE — PROGRESS NOTES
"Subjective:      Patient ID: Lv Crocker is a 42 y.o. male.    Chief Complaint: Eye Pain      HPI  Mr. Crocker is a patient of mine, who presents for 3-4 days of constant left eye pain. He endorses intermittent left eye burning, watering and one episode of left eye blurry vision on Tuesday night for 2 minutes. He notes that his immediate family members are dealing with URIs. No significant sneezing, coughing or rhinorrhea. +fatigue. He reports trying to decrease his water intake in an attempt to minimize ascites accumulation. His last paracentesis was Friday. He continues to take Torsemide. He also reports loose and watery stools all day yesterday.     Past Medical History:   Diagnosis Date    Arthritis     CAD (coronary artery disease)     H/o Coronary artery disease; resolved since heart transplant 2014    Cardiomyopathy     CHF (congestive heart failure)     Previously EF 20% (prior to heart transplant); last EF 55-60%; throught to be 2/2 genetics & DM1    Depression     Controlled "for the most part" on Lexipro    Encounter for blood transfusion     during transplant    GERD (gastroesophageal reflux disease)     Hashimoto's disease     HLD (hyperlipidemia) 6/11/2015    Hypoglycemia unawareness in type 1 diabetes mellitus     Hypothyroid     Initially hyperthyroid 2/2 Hoshimoto's thyroiditis; now on levothyroxine 150 mcg qd    Myocardial infarction     h/o MI x3 prior to heart transplant    Syncope 6/30/2015    Type I (juvenile type) diabetes mellitus with unspecified complication, uncontrolled     Diagnosis at 6 years old; on N insulin 20U BID & Humalog 10U with meals +SSI    Unspecified essential hypertension 5/29/2014     Past Surgical History:   Procedure Laterality Date    CARDIAC CATHETERIZATION      CARDIAC SURGERY      CHOLECYSTECTOMY      CORONARY ANGIOPLASTY      FOOT SPLIT TRANSFER OF THE POSTERIOR TIBIALIS TENDON PROCEDURE      HEART TRANSPLANT  2014    KNEE SURGERY   "    s/p oht  November 2014    stent placemnet       Social History     Social History    Marital status:      Spouse name: N/A    Number of children: N/A    Years of education: N/A     Occupational History    Not on file.     Social History Main Topics    Smoking status: Never Smoker    Smokeless tobacco: Never Used    Alcohol use No    Drug use: No    Sexual activity: Yes     Partners: Female     Other Topics Concern    Not on file     Social History Narrative            Breakfast: Skips 80%; cereal; Diet Sprite    Lunch: Turkey or chicken sandwich on white bread; Diet Sprite    Dinner: Grilled burgers, small amount chips; Diet Sprite    Snacks: Ronny crackers before bed on most nights    Eats out: 2x/wk    Water: 0    PA: Walks 15 minutes (strenuous) daily    Goal weight 170-175 lbs by 1/2018     Family History   Problem Relation Age of Onset    Heart disease Mother     Kidney disease Mother     Diabetes Mother     Hypertension Mother     Kidney disease Father     Diabetes Father     Hypertension Father     Stroke Father     Heart disease Brother     Stroke Brother     Diabetes Brother     Cancer Brother 50     pancreatic    Vision loss Brother     Hypertension Brother     Diabetes Son     Diabetes Sister     Diabetes Maternal Uncle     Diabetes Paternal Aunt     Diabetes Maternal Grandmother     Diabetes Maternal Grandfather        Current Outpatient Prescriptions:     aspirin (ECOTRIN) 81 MG EC tablet, Take 1 tablet (81 mg total) by mouth once daily., Disp: 30 tablet, Rfl: 11    escitalopram oxalate (LEXAPRO) 20 MG tablet, Take 1 tablet (20 mg total) by mouth once daily., Disp: 30 tablet, Rfl: 11    fluticasone (FLONASE) 50 mcg/actuation nasal spray, 2 sprays by Each Nare route once daily., Disp: 1 Bottle, Rfl: 12    gabapentin (NEURONTIN) 300 MG capsule, Take 3 capsules (900 mg total) by mouth 3 (three) times daily., Disp: 270 capsule, Rfl: 11    insulin  "detemir (LEVEMIR FLEXTOUCH) 100 unit/mL (3 mL) SubQ InPn pen, 14 units bid, Disp: 15 mL, Rfl: 12    insulin lispro (HUMALOG KWIKPEN) 100 unit/mL InPn pen, 8 units AC + correction Max TDD 40 units, Disp: 15 mL, Rfl: 12    lancets Misc, 1 Device by Misc.(Non-Drug; Combo Route) route 4 (four) times daily with meals and nightly., Disp: 100 each, Rfl: 5    levothyroxine (SYNTHROID) 150 MCG tablet, Take 1 tablet (150 mcg total) by mouth every morning., Disp: 30 tablet, Rfl: 12    magnesium oxide (MAG-OX) 400 mg tablet, Take 1 tablet (400 mg total) by mouth once daily., Disp: 30 tablet, Rfl: 11    mycophenolate (MYFORTIC) 180 MG TbEC, Take 4 tablets (720 mg total) by mouth 2 (two) times daily. S/P Heart Transplant 11/18/2014 ICD-10 Z94.1, Disp: 240 tablet, Rfl: 11    nortriptyline (PAMELOR) 25 MG capsule, Take 1 capsule (25 mg total) by mouth every evening., Disp: 30 capsule, Rfl: 3    ondansetron (ZOFRAN-ODT) 4 MG TbDL, Take 2 tablets (8 mg total) by mouth every 8 (eight) hours as needed (nausea)., Disp: 15 tablet, Rfl: 0    pantoprazole (PROTONIX) 40 MG tablet, TAKE ONE TABLET BY MOUTH EVERY DAY, Disp: 30 tablet, Rfl: 11    pen needle, diabetic 32 gauge x 5/32" Ndle, Uses 5 pen needles a day, Disp: 200 each, Rfl: 12    pravastatin (PRAVACHOL) 40 MG tablet, Take 1 tablet (40 mg total) by mouth every evening., Disp: 30 tablet, Rfl: 11    predniSONE (DELTASONE) 2.5 MG tablet, Take 1 tablet (2.5 mg total) by mouth once daily. S/P Heart Transplant 11/18/2014 ICD-10 Z94.1, Disp: 30 tablet, Rfl: 11    tacrolimus (PROGRAF) 1 MG Cap, Taked 2mg orally in the morning and 3mg orally in the evening.  S/p heart transplant  11/18/2014  ICD-10 Z94.1, Disp: 150 capsule, Rfl: 11    tamsulosin (FLOMAX) 0.4 mg Cp24, TAKE 2 CAPSULES(0.8 MG) BY MOUTH EVERY EVENING, Disp: 60 capsule, Rfl: 1    torsemide (DEMADEX) 20 MG Tab, Take 2 tablets (40 mg total) by mouth once daily., Disp: 60 tablet, Rfl: 11    Review of patient's " "allergies indicates:   Allergen Reactions    No known drug allergies      Review of Systems   NEGATIVE, except as in HPI    Objective:     BP (!) 110/57 (BP Location: Right arm, Patient Position: Sitting, BP Method: Medium (Automatic))   Pulse 108   Temp 97.5 °F (36.4 °C) (Tympanic)   Resp 20   Ht 5' 7" (1.702 m)   Wt 84.3 kg (185 lb 13.6 oz)   SpO2 96%   BMI 29.11 kg/m²     Physical Exam  GEN: A&O fully, NAD  PSYC: Normal affect  HEENT: OP: Clear, no LAD, no thyroid masses, normal sclera, no erythema, no TTP of left temporal region    Lab Results   Component Value Date    WBC 5.42 10/04/2017    HGB 10.2 (L) 10/04/2017    HCT 30.6 (L) 10/04/2017     10/04/2017    CHOL 117 (L) 10/02/2017    TRIG 69 10/02/2017    HDL 30 (L) 10/02/2017    ALT 16 10/02/2017    AST 26 10/02/2017     10/04/2017    K 4.5 10/04/2017     10/04/2017    CREATININE 2.5 (H) 10/04/2017    BUN 31 (H) 10/04/2017    CO2 27 10/04/2017    TSH 0.769 01/23/2017    PSA 0.59 12/06/2012    INR 1.1 10/04/2017    HGBA1C 7.2 (H) 04/05/2017       Assessment:      1. Left eye pain: Likely 2/2 HA from dehydration. DDx includes infection, although this is less likely given absence of fever or erythema. DDx also includes temporal arteritis given brief vision loss. Will refer to his ophthalmologist here in [place] and check CBC w/diff & ESR.   2. Diarrhea, unspecified type: Likely cause of above. Will check electrolytes given torsemide, low fluid intake and recent diarrhea.      Plan:   Left eye pain  -     Sedimentation rate, manual; Future; Expected date: 11/09/2017  -     CBC auto differential; Future; Expected date: 11/09/2017    Diarrhea, unspecified type  -     Basic metabolic panel; Future; Expected date: 11/09/2017        RTC already scheduled for Jan 2018 and he can RTC sooner as needed  "

## 2017-11-27 NOTE — PROGRESS NOTES
4L ahsan fluid removed from abdomen. Pt tolerated well, vss. bandaid in place to rt abdomen healthy site. D/c instructions explained and copy given to patient. Pt verbalized understanding. Pt wheeled to personal vehicle driven by spouse.

## 2017-11-27 NOTE — DISCHARGE SUMMARY
Sterile technique was performed in the RLQ, lidocaine was used as a local anesthetic.  4 liters of ahsan fluid removed for therapeutic purposes.  Pt tolerated the procedure well without immediate complications.  Please see radiologist report for details. F/u with PCP and/or ordering physician.

## 2017-11-27 NOTE — DISCHARGE INSTRUCTIONS

## 2017-11-27 NOTE — H&P (VIEW-ONLY)
"Subjective:      Patient ID: Lv Crocker is a 42 y.o. male.    Chief Complaint: Eye Pain      HPI  Mr. Crocker is a patient of mine, who presents for 3-4 days of constant left eye pain. He endorses intermittent left eye burning, watering and one episode of left eye blurry vision on Tuesday night for 2 minutes. He notes that his immediate family members are dealing with URIs. No significant sneezing, coughing or rhinorrhea. +fatigue. He reports trying to decrease his water intake in an attempt to minimize ascites accumulation. His last paracentesis was Friday. He continues to take Torsemide. He also reports loose and watery stools all day yesterday.     Past Medical History:   Diagnosis Date    Arthritis     CAD (coronary artery disease)     H/o Coronary artery disease; resolved since heart transplant 2014    Cardiomyopathy     CHF (congestive heart failure)     Previously EF 20% (prior to heart transplant); last EF 55-60%; throught to be 2/2 genetics & DM1    Depression     Controlled "for the most part" on Lexipro    Encounter for blood transfusion     during transplant    GERD (gastroesophageal reflux disease)     Hashimoto's disease     HLD (hyperlipidemia) 6/11/2015    Hypoglycemia unawareness in type 1 diabetes mellitus     Hypothyroid     Initially hyperthyroid 2/2 Hoshimoto's thyroiditis; now on levothyroxine 150 mcg qd    Myocardial infarction     h/o MI x3 prior to heart transplant    Syncope 6/30/2015    Type I (juvenile type) diabetes mellitus with unspecified complication, uncontrolled     Diagnosis at 6 years old; on N insulin 20U BID & Humalog 10U with meals +SSI    Unspecified essential hypertension 5/29/2014     Past Surgical History:   Procedure Laterality Date    CARDIAC CATHETERIZATION      CARDIAC SURGERY      CHOLECYSTECTOMY      CORONARY ANGIOPLASTY      FOOT SPLIT TRANSFER OF THE POSTERIOR TIBIALIS TENDON PROCEDURE      HEART TRANSPLANT  2014    KNEE SURGERY   "    s/p oht  November 2014    stent placemnet       Social History     Social History    Marital status:      Spouse name: N/A    Number of children: N/A    Years of education: N/A     Occupational History    Not on file.     Social History Main Topics    Smoking status: Never Smoker    Smokeless tobacco: Never Used    Alcohol use No    Drug use: No    Sexual activity: Yes     Partners: Female     Other Topics Concern    Not on file     Social History Narrative            Breakfast: Skips 80%; cereal; Diet Sprite    Lunch: Turkey or chicken sandwich on white bread; Diet Sprite    Dinner: Grilled burgers, small amount chips; Diet Sprite    Snacks: Ronny crackers before bed on most nights    Eats out: 2x/wk    Water: 0    PA: Walks 15 minutes (strenuous) daily    Goal weight 170-175 lbs by 1/2018     Family History   Problem Relation Age of Onset    Heart disease Mother     Kidney disease Mother     Diabetes Mother     Hypertension Mother     Kidney disease Father     Diabetes Father     Hypertension Father     Stroke Father     Heart disease Brother     Stroke Brother     Diabetes Brother     Cancer Brother 50     pancreatic    Vision loss Brother     Hypertension Brother     Diabetes Son     Diabetes Sister     Diabetes Maternal Uncle     Diabetes Paternal Aunt     Diabetes Maternal Grandmother     Diabetes Maternal Grandfather        Current Outpatient Prescriptions:     aspirin (ECOTRIN) 81 MG EC tablet, Take 1 tablet (81 mg total) by mouth once daily., Disp: 30 tablet, Rfl: 11    escitalopram oxalate (LEXAPRO) 20 MG tablet, Take 1 tablet (20 mg total) by mouth once daily., Disp: 30 tablet, Rfl: 11    fluticasone (FLONASE) 50 mcg/actuation nasal spray, 2 sprays by Each Nare route once daily., Disp: 1 Bottle, Rfl: 12    gabapentin (NEURONTIN) 300 MG capsule, Take 3 capsules (900 mg total) by mouth 3 (three) times daily., Disp: 270 capsule, Rfl: 11    insulin  "detemir (LEVEMIR FLEXTOUCH) 100 unit/mL (3 mL) SubQ InPn pen, 14 units bid, Disp: 15 mL, Rfl: 12    insulin lispro (HUMALOG KWIKPEN) 100 unit/mL InPn pen, 8 units AC + correction Max TDD 40 units, Disp: 15 mL, Rfl: 12    lancets Misc, 1 Device by Misc.(Non-Drug; Combo Route) route 4 (four) times daily with meals and nightly., Disp: 100 each, Rfl: 5    levothyroxine (SYNTHROID) 150 MCG tablet, Take 1 tablet (150 mcg total) by mouth every morning., Disp: 30 tablet, Rfl: 12    magnesium oxide (MAG-OX) 400 mg tablet, Take 1 tablet (400 mg total) by mouth once daily., Disp: 30 tablet, Rfl: 11    mycophenolate (MYFORTIC) 180 MG TbEC, Take 4 tablets (720 mg total) by mouth 2 (two) times daily. S/P Heart Transplant 11/18/2014 ICD-10 Z94.1, Disp: 240 tablet, Rfl: 11    nortriptyline (PAMELOR) 25 MG capsule, Take 1 capsule (25 mg total) by mouth every evening., Disp: 30 capsule, Rfl: 3    ondansetron (ZOFRAN-ODT) 4 MG TbDL, Take 2 tablets (8 mg total) by mouth every 8 (eight) hours as needed (nausea)., Disp: 15 tablet, Rfl: 0    pantoprazole (PROTONIX) 40 MG tablet, TAKE ONE TABLET BY MOUTH EVERY DAY, Disp: 30 tablet, Rfl: 11    pen needle, diabetic 32 gauge x 5/32" Ndle, Uses 5 pen needles a day, Disp: 200 each, Rfl: 12    pravastatin (PRAVACHOL) 40 MG tablet, Take 1 tablet (40 mg total) by mouth every evening., Disp: 30 tablet, Rfl: 11    predniSONE (DELTASONE) 2.5 MG tablet, Take 1 tablet (2.5 mg total) by mouth once daily. S/P Heart Transplant 11/18/2014 ICD-10 Z94.1, Disp: 30 tablet, Rfl: 11    tacrolimus (PROGRAF) 1 MG Cap, Taked 2mg orally in the morning and 3mg orally in the evening.  S/p heart transplant  11/18/2014  ICD-10 Z94.1, Disp: 150 capsule, Rfl: 11    tamsulosin (FLOMAX) 0.4 mg Cp24, TAKE 2 CAPSULES(0.8 MG) BY MOUTH EVERY EVENING, Disp: 60 capsule, Rfl: 1    torsemide (DEMADEX) 20 MG Tab, Take 2 tablets (40 mg total) by mouth once daily., Disp: 60 tablet, Rfl: 11    Review of patient's " "allergies indicates:   Allergen Reactions    No known drug allergies      Review of Systems   NEGATIVE, except as in HPI    Objective:     BP (!) 110/57 (BP Location: Right arm, Patient Position: Sitting, BP Method: Medium (Automatic))   Pulse 108   Temp 97.5 °F (36.4 °C) (Tympanic)   Resp 20   Ht 5' 7" (1.702 m)   Wt 84.3 kg (185 lb 13.6 oz)   SpO2 96%   BMI 29.11 kg/m²     Physical Exam  GEN: A&O fully, NAD  PSYC: Normal affect  HEENT: OP: Clear, no LAD, no thyroid masses, normal sclera, no erythema, no TTP of left temporal region    Lab Results   Component Value Date    WBC 5.42 10/04/2017    HGB 10.2 (L) 10/04/2017    HCT 30.6 (L) 10/04/2017     10/04/2017    CHOL 117 (L) 10/02/2017    TRIG 69 10/02/2017    HDL 30 (L) 10/02/2017    ALT 16 10/02/2017    AST 26 10/02/2017     10/04/2017    K 4.5 10/04/2017     10/04/2017    CREATININE 2.5 (H) 10/04/2017    BUN 31 (H) 10/04/2017    CO2 27 10/04/2017    TSH 0.769 01/23/2017    PSA 0.59 12/06/2012    INR 1.1 10/04/2017    HGBA1C 7.2 (H) 04/05/2017       Assessment:      1. Left eye pain: Likely 2/2 HA from dehydration. DDx includes infection, although this is less likely given absence of fever or erythema. DDx also includes temporal arteritis given brief vision loss. Will refer to his ophthalmologist here in [place] and check CBC w/diff & ESR.   2. Diarrhea, unspecified type: Likely cause of above. Will check electrolytes given torsemide, low fluid intake and recent diarrhea.      Plan:   Left eye pain  -     Sedimentation rate, manual; Future; Expected date: 11/09/2017  -     CBC auto differential; Future; Expected date: 11/09/2017    Diarrhea, unspecified type  -     Basic metabolic panel; Future; Expected date: 11/09/2017        RTC already scheduled for Jan 2018 and he can RTC sooner as needed  "

## 2017-11-30 PROBLEM — M85.80 OSTEOPENIA: Status: ACTIVE | Noted: 2017-01-01

## 2017-11-30 NOTE — LETTER
December 5, 2017        Placido Tobias  [address] [address]  Phone: [phone]  Fax: [phone]             [facility] [address] [place] [address]  Phone: [phone]   Patient: Lv Crocker   MR Number: [id number]   YOB: 1975   Date of Visit: 11/30/2017       Dear Dr. Placido Tobias    Thank you for referring Lv Crocker to me for evaluation. Attached you will find relevant portions of my assessment and plan of care.    If you have questions, please do not hesitate to call me. I look forward to following Lv Crocker along with you.    Sincerely,    Kanika Ferreira MD    Enclosure    If you would like to receive this communication electronically, please contact [email or web address] or [phone] to request [employer] access.    [employer] is a tool which provides read-only access to select patient information with whom you have a relationship. Its easy to use and provides real time access to review your patients record including encounter summaries, notes, results, and demographic information.    If you feel you have received this communication in error or would no longer like to receive these types of communications, please e-mail [email or web address]

## 2017-11-30 NOTE — PROGRESS NOTES
CC: This 42 y.o. male presents for management of diamete mellitus type 1  along with the current chronic medical conditions including: heart tx, HTN, HLD, hypothyroidism    HPI:  Mr. Crocker was diagnosed with type 1 diabetes at the age of 6 years. He was started on an Omni Pod insulin pump in 2012 in [place] - his care was transferred here when he became a heart transplant candidate due to severe ischemic cardiomyopathy.     Mr Crocker is s/p heart transplant, 2014, complicated hospital course.  .       Checks bg 4 times a day. He presents to clinic today with glucose meter    30 day average - 207 mg/dL   Fastin, 269, 84  Lunch: 197  Dinner: 75  Bedtime: 286, 116  5:30 am _ BG of 53     Hypoglycemia: 1-2 times per week , early morning   Symptoms include: numbness and tingling sensations to hands   Treats appropriately by taking a light snack or glucose tabs     24 hour dietary recall:   Breakfast: skipped   Lunch: slice of pizza   Dinner: chicken tenders, fries     He reports decreased appetite since transplant     Currently, he denies polyuria, polydipsia, unexplained weight loss or blurred vision       CURRENT DIABETES MEDICATIONS:   NPH 20 unit(s) bid, regular 10 unit(s) AC - only consuming 1-2 meals daily     Previously prescribed Novolog and Levemir but pt is currently in the prescription gap so he resumed taking NPH and Regular     Standards of Care:  Eye exam: 2017 - no retinopathy   Followed by outside ophthalmologist     He does not follow with podiatry     Known complications: neuropathy and CKD     Review of Systems   Constitutional: Positive for malaise/fatigue.   HENT: Negative for hearing loss and sore throat.    Eyes: Negative for blurred vision.   Respiratory: Negative for cough.    Cardiovascular: Negative for leg swelling.   Gastrointestinal: Negative for constipation, diarrhea and heartburn.   Genitourinary: Negative for hematuria.   Musculoskeletal: Negative  for myalgias.   Psychiatric/Behavioral: The patient is not nervous/anxious.      Physical Exam   Constitutional: He is oriented to person, place, and time.   Neck: No thyromegaly present.   Cardiovascular: Normal rate.    Pulses:       Dorsalis pedis pulses are 2+ on the right side, and 2+ on the left side.   Pulmonary/Chest: Effort normal.   Abdominal: Soft.   Musculoskeletal: He exhibits no edema.        Right foot: There is no deformity.        Left foot: There is no deformity.   Feet:   Right Foot:   Protective Sensation: 10 sites tested. 10 sites sensed.   Skin Integrity: Negative for ulcer, blister, skin breakdown, erythema, warmth, callus or dry skin.   Left Foot:   Protective Sensation: 10 sites tested. 10 sites sensed.   Skin Integrity: Negative for ulcer, blister, skin breakdown, erythema, warmth, callus or dry skin.   Neurological: He is alert and oriented to person, place, and time.   Decreased vibratory sensation noted bilaterally    Skin: Skin is warm and dry.   Vitals reviewed.      BONE MINERAL DENSITY RESULTS: 12/3/15  Lumbar Spine: Lumbar bone mineral density L1-L4 is 0.849g/cm2, which is a t-score of -2.2. The z-score is -2.0.  Total Hip: The total hip bone mineral density is 0.724g/cm2. The t-score is -2.0, and the z-score is -1.8. Femoral neck BMD is 0.620g/cm2 and the t-score is -2.3.    Hemoglobin A1C   Date Value Ref Range Status   11/30/2017 6.9 (H) 4.0 - 5.6 % Final     Comment:     According to ADA guidelines, hemoglobin A1c <7.0% represents  optimal control in non-pregnant diabetic patients. Different  metrics may apply to specific patient populations.   Standards of Medical Care in Diabetes-2016.  For the purpose of screening for the presence of diabetes:  <5.7%     Consistent with the absence of diabetes  5.7-6.4%  Consistent with increasing risk for diabetes   (prediabetes)  >or=6.5%  Consistent with diabetes  Currently, no consensus exists for use of hemoglobin A1c  for diagnosis of  diabetes for children.  This Hemoglobin A1c assay has significant interference with fetal   hemoglobin   (HbF). The results are invalid for patients with abnormal amounts of   HbF,   including those with known Hereditary Persistence   of Fetal Hemoglobin. Heterozygous hemoglobin variants (HbAS, HbAC,   HbAD, HbAE, HbA2) do not significantly interfere with this assay;   however, presence of multiple variants in a sample may impact the %   interference.     04/05/2017 7.2 (H) 4.5 - 6.2 % Final     Comment:     According to ADA guidelines, hemoglobin A1C <7.0% represents  optimal control in non-pregnant diabetic patients.  Different  metrics may apply to specific populations.   Standards of Medical Care in Diabetes - 2016.  For the purpose of screening for the presence of diabetes:  <5.7%     Consistent with the absence of diabetes  5.7-6.4%  Consistent with increasing risk for diabetes   (prediabetes)  >or=6.5%  Consistent with diabetes  Currently no consensus exists for use of hemoglobin A1C  for diagnosis of diabetes for children.     01/23/2017 7.1 (H) 4.5 - 6.2 % Final     Comment:     According to ADA guidelines, hemoglobin A1C <7.0% represents  optimal control in non-pregnant diabetic patients.  Different  metrics may apply to specific populations.   Standards of Medical Care in Diabetes - 2016.  For the purpose of screening for the presence of diabetes:  <5.7%     Consistent with the absence of diabetes  5.7-6.4%  Consistent with increasing risk for diabetes   (prediabetes)  >or=6.5%  Consistent with diabetes  Currently no consensus exists for use of hemoglobin A1C  for diagnosis of diabetes for children.       ASSESSMENT and PLAN:  1. Type 1 diabetes mellitus with hyperglycemia  insulin NPH (NOVOLIN N) 100 unit/mL injection    Fructosamine    TSH    Comprehensive metabolic panel   2. Type 1 diabetes mellitus with stage 3 chronic kidney disease     3. Hypothyroidism due to Hashimoto's thyroiditis     4. Heart  transplanted     5. Osteopenia, unspecified location         1. T1DM with CKD and hyper and hypoglycemia   -- goal is to avoid hypoglycemia   -- decrease evening dose of NPH to 18 units   -- continue regular insulin 10 units before meals   -- bring log or meter to every office visit for review   -- check fructosamine prior to next visit as A1c is falsely low due to anemia   -- new Rx for glucagon provided   -- pt aware of hypoglycemia protocol     3. Hypothyroidism  -- clinically and biochemically euthyroid   -- goal is a normal TSH   -- continue levothyroxine as prescribed   -- avoid exogenous hyperthyroidism as this can accelerate bone loss and increase risk of CV complications     4. Heart transplanted   -- followed by transplant services     5. Osteopenia   -- repeat BMD today   -- reinforced fall safety and fall precautions   -- followed by transplant services     The patient is instructed to notify the office with BG concerns or questions

## 2017-11-30 NOTE — TELEPHONE ENCOUNTER
Left message for pt that tacro level was 4.4 and the need to repeat tomorrow morning. Asked pt to call me to tell me where he wants his labs drawn.

## 2017-12-04 NOTE — PROGRESS NOTES
"Subjective:      Patient ID: Lv Crocker is a 42 y.o. male.    Chief Complaint: No chief complaint on file.      HPI  Mr. Crocker is a patient of mine, who presents for cough since last Wednesday, which is associated with pressure in his ears. +sore throat. No f/c. No n/v/d. He reports his cough is dry. He hasn't tried any OTC for it. He reports taking Protonix for his GERD QD. No muscle aches. No sore throat.      Past Medical History:   Diagnosis Date    Arthritis     CAD (coronary artery disease)     H/o Coronary artery disease; resolved since heart transplant 2014    Cardiomyopathy     CHF (congestive heart failure)     Previously EF 20% (prior to heart transplant); last EF 55-60%; throught to be 2/2 genetics & DM1    Depression     Controlled "for the most part" on Lexipro    Encounter for blood transfusion     during transplant    GERD (gastroesophageal reflux disease)     Hashimoto's disease     HLD (hyperlipidemia) 6/11/2015    Hypoglycemia unawareness in type 1 diabetes mellitus     Hypothyroid     Initially hyperthyroid 2/2 Hoshimoto's thyroiditis; now on levothyroxine 150 mcg qd    Myocardial infarction     h/o MI x3 prior to heart transplant    Syncope 6/30/2015    Type I (juvenile type) diabetes mellitus with unspecified complication, uncontrolled     Controlled; Dx'd @5y/o; on N insulin 20U BID & Humalog 10U w/meals +SSI; Glu 89 11/9/17; A1c 7.2% 4/5/17    Unspecified essential hypertension 05/29/2014    Well controlled; Last /57 on 11/9/17     Past Surgical History:   Procedure Laterality Date    CARDIAC CATHETERIZATION      CARDIAC SURGERY      CHOLECYSTECTOMY      CORONARY ANGIOPLASTY      FOOT SPLIT TRANSFER OF THE POSTERIOR TIBIALIS TENDON PROCEDURE      HEART TRANSPLANT  2014    KNEE SURGERY      s/p oht  November 2014    stent placemnet       Social History     Social History    Marital status:      Spouse name: N/A    Number of children: N/A "    Years of education: N/A     Occupational History    Not on file.     Social History Main Topics    Smoking status: Never Smoker    Smokeless tobacco: Never Used    Alcohol use No    Drug use: No    Sexual activity: Yes     Partners: Female     Other Topics Concern    Not on file     Social History Narrative            Breakfast: Skips 80%; cereal; Diet Sprite    Lunch: Turkey or chicken sandwich on white bread; Diet Sprite    Dinner: Grilled burgers, small amount chips; Diet Sprite    Snacks: Ronny crackers before bed on most nights    Eats out: 2x/wk    Water: 0    PA: Walks 15 minutes (strenuous) daily    Goal weight 170-175 lbs by 1/2018     Family History   Problem Relation Age of Onset    Heart disease Mother     Kidney disease Mother     Diabetes Mother     Hypertension Mother     Kidney disease Father     Diabetes Father     Hypertension Father     Stroke Father     Heart disease Brother     Stroke Brother     Diabetes Brother     Cancer Brother 50     pancreatic    Vision loss Brother     Hypertension Brother     Diabetes Son     Diabetes Sister     Diabetes Maternal Uncle     Diabetes Paternal Aunt     Diabetes Maternal Grandmother     Diabetes Maternal Grandfather        Current Outpatient Prescriptions:     aspirin (ECOTRIN) 81 MG EC tablet, Take 1 tablet (81 mg total) by mouth once daily., Disp: 30 tablet, Rfl: 11    escitalopram oxalate (LEXAPRO) 20 MG tablet, Take 1 tablet (20 mg total) by mouth once daily., Disp: 30 tablet, Rfl: 11    fluticasone (FLONASE) 50 mcg/actuation nasal spray, 2 sprays by Each Nare route once daily., Disp: 1 Bottle, Rfl: 12    gabapentin (NEURONTIN) 300 MG capsule, Take 3 capsules (900 mg total) by mouth 3 (three) times daily., Disp: 270 capsule, Rfl: 11    glucagon (human recombinant) inj 1mg/mL kit, Inject 1 mL (1 mg total) into the muscle as needed., Disp: 1 kit, Rfl: 6    insulin NPH (NOVOLIN N) 100 unit/mL injection, Inject 20  "Units into the skin 2 (two) times daily before meals., Disp: 1 vial, Rfl: 0    lancets Misc, 1 Device by Misc.(Non-Drug; Combo Route) route 4 (four) times daily with meals and nightly., Disp: 100 each, Rfl: 5    levothyroxine (SYNTHROID) 150 MCG tablet, Take 1 tablet (150 mcg total) by mouth every morning., Disp: 30 tablet, Rfl: 12    magnesium oxide (MAG-OX) 400 mg tablet, Take 1 tablet (400 mg total) by mouth once daily., Disp: 30 tablet, Rfl: 11    mycophenolate (MYFORTIC) 180 MG TbEC, Take 4 tablets (720 mg total) by mouth 2 (two) times daily. S/P Heart Transplant 11/18/2014 ICD-10 Z94.1, Disp: 240 tablet, Rfl: 11    nortriptyline (PAMELOR) 25 MG capsule, Take 1 capsule (25 mg total) by mouth every evening., Disp: 30 capsule, Rfl: 3    ondansetron (ZOFRAN-ODT) 4 MG TbDL, Take 2 tablets (8 mg total) by mouth every 8 (eight) hours as needed (nausea)., Disp: 15 tablet, Rfl: 0    pantoprazole (PROTONIX) 40 MG tablet, TAKE ONE TABLET BY MOUTH EVERY DAY, Disp: 30 tablet, Rfl: 11    pen needle, diabetic 32 gauge x 5/32" Ndle, Uses 5 pen needles a day, Disp: 200 each, Rfl: 12    pravastatin (PRAVACHOL) 40 MG tablet, Take 1 tablet (40 mg total) by mouth every evening., Disp: 30 tablet, Rfl: 11    predniSONE (DELTASONE) 2.5 MG tablet, Take 1 tablet (2.5 mg total) by mouth once daily. S/P Heart Transplant 11/18/2014 ICD-10 Z94.1, Disp: 30 tablet, Rfl: 11    tacrolimus (PROGRAF) 1 MG Cap, Taked 3mg orally in the morning and 3mg orally in the evening. S/p heart transplant  11/18/2014  ICD-10 Z94.1, Disp: 180 capsule, Rfl: 11    tamsulosin (FLOMAX) 0.4 mg Cp24, TAKE 2 CAPSULES(0.8 MG) BY MOUTH EVERY EVENING, Disp: 60 capsule, Rfl: 1    torsemide (DEMADEX) 20 MG Tab, Take 2 tablets (40 mg total) by mouth once daily., Disp: 60 tablet, Rfl: 11    guaifenesin-codeine 100-10 mg/5 ml (TUSSI-ORGANIDIN NR)  mg/5 mL syrup, Take 5 mLs by mouth 3 (three) times daily as needed., Disp: 118 mL, Rfl: 0    Review of " "patient's allergies indicates:   Allergen Reactions    No known drug allergies         Review of Systems   Negative.     Objective:     BP (!) 104/56 (BP Location: Left arm, Patient Position: Sitting, BP Method: Medium (Automatic))   Pulse 92   Temp 97.6 °F (36.4 °C) (Tympanic)   Resp 18   Ht 5' 7" (1.702 m)   Wt 86.2 kg (190 lb 0.6 oz)   SpO2 95%   BMI 29.76 kg/m²     Physical Exam  GEN: A&O fully, NAD  PSYC: Normal affect  HEENT: OP: Clear, no exudates, no LAD, no thyroid masses  CV: RRR, no M/G/R  PULM: Decreased breath sounds in right middle and RLL, no wheezes, rales    Lab Results   Component Value Date    WBC 5.04 11/30/2017    HGB 10.0 (L) 11/30/2017    HCT 31.4 (L) 11/30/2017     11/30/2017    CHOL 120 11/30/2017    TRIG 66 11/30/2017    HDL 32 (L) 11/30/2017    ALT 15 11/30/2017    AST 25 11/30/2017     11/30/2017    K 4.0 11/30/2017     11/30/2017    CREATININE 2.3 (H) 11/30/2017    BUN 25 (H) 11/30/2017    CO2 29 11/30/2017    TSH 1.485 11/30/2017    PSA 0.59 12/06/2012    INR 1.1 11/27/2017    HGBA1C 6.9 (H) 11/30/2017 11/30/17:   Pharm Stress Test with Color flow:  EF 55-60%      Assessment:      1. Cough: Likely 2/2 viral infection vs. GERD. Not on an ACEi. Will check CXR to r/u pna given immunocompromised state and current (although chronic) abn PX: right lung fields and tachycardia. Risks and benefits discussed and patient chose to move forward Robitussin AC QHS prn.   2. Type 1 diabetes mellitus with stage 3 chronic kidney disease: Well controlled.    3. Chronic systolic congestive heart failure: Normal EF recently. Unlikely a cause of his current cough, although it is in the DDx.   4. Tachycardia: Chronic. Likely 2/2 his known heart TPx. Will check CXR in case this is a SIRs criteria.     Plan:   Cough  -     guaifenesin-codeine 100-10 mg/5 ml (TUSSI-ORGANIDIN NR)  mg/5 mL syrup; Take 5 mLs by mouth 3 (three) times daily as needed.  Dispense: 118 mL; Refill: " 0  -     X-Ray Chest PA And Lateral; Future; Expected date: 12/04/2017  -     POCT INFLUENZA A/B    Type 1 diabetes mellitus with stage 3 chronic kidney disease    Chronic systolic congestive heart failure    Tachycardia  -     POCT INFLUENZA A/B  -     POCT Rapid Strep A        RTC in 3 months RE overweight or sooner as needed

## 2017-12-04 NOTE — TELEPHONE ENCOUNTER
Called pt to notifiy of tacro 3.5. Dr. Ferreira ordered to increase dose to 3/3 and repeat level on Thursday. Pt stated his understanding. Pt coughing on the phone, stated it started a few days ago, no production of sputum, no fever, has a sore throat. Advised pt to go to PCP.
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
## 2017-12-07 NOTE — TELEPHONE ENCOUNTER
Called to notify pt of the need for tacro level in the morning, if the weather isn't too bad for him to travel. Informed him of his Allomap score being elevated, however he has no DSA or C1Q. Also, made him aware that with his recent cold, that may falsely elevate his Allomap. Dr. Ferreira wants to repeat testing in 3 months. Pt stated his understanding.

## 2017-12-07 NOTE — TELEPHONE ENCOUNTER
Reviewed pt's Allomap of 31, previous 24 (4/5/2017) with Dr. Ferreira. Pt has no DSA or C1Q. Pt had a cold over the weekend. Pt's tacro level has been subtherapeutic in 3.5-4.4. Will recheck tacro level to make sure it is within goal and check Allomap with HLA's in 3 months.

## 2017-12-10 NOTE — PROGRESS NOTES
Subjective:       Patient ID: Lv Crocker is a 42 y.o. male      Chief Complaint:   Chief Complaint   Patient presents with    Cough    URI         Back Pain   This is a new problem. The current episode started in the past 7 days. The problem is unchanged. The quality of the pain is described as aching. The pain does not radiate. The symptoms are aggravated by coughing. Pertinent negatives include no abdominal pain, chest pain, dysuria, fever or headaches. He has tried nothing for the symptoms.   Cough   Associated symptoms include myalgias. Pertinent negatives include no chest pain, ear pain, fever, headaches, sore throat, shortness of breath or wheezing.       Lv Crocker presents to clinic with complaints of cough and lower back pain for several days. He is here today with his wife/ They report Brain is an immunocompromised heart transplant patient.       Review of Systems   Constitutional: Negative.  Negative for fever and malaise/fatigue.   HENT: Negative.  Negative for congestion, ear pain and sore throat.    Respiratory: Positive for cough. Negative for shortness of breath and wheezing.    Cardiovascular: Negative for chest pain, palpitations and leg swelling.   Gastrointestinal: Negative for abdominal pain, diarrhea, nausea and vomiting.   Genitourinary: Negative for dysuria.   Musculoskeletal: Positive for back pain and myalgias.   Skin: Negative.    Neurological: Negative for dizziness and headaches.   Psychiatric/Behavioral: Negative.    All other systems reviewed and are negative.      Physical Exam   Constitutional: He is oriented to person, place, and time. He appears well-developed and well-nourished.   HENT:   Head: Normocephalic and atraumatic.   Eyes: Conjunctivae and EOM are normal.   Neck: Normal range of motion.   Cardiovascular: Normal rate and regular rhythm.    No murmur heard.  Pulmonary/Chest: Effort normal and breath sounds normal. No respiratory distress. He has no  wheezes. He exhibits no tenderness.   Abdominal: Soft. Bowel sounds are normal. There is no CVA tenderness.   Musculoskeletal: Normal range of motion.   Neurological: He is alert and oriented to person, place, and time.   Skin: Skin is warm and dry.   Psychiatric: He has a normal mood and affect.       1. Acute left-sided low back pain without sciatica    2. Cough    3. Shortness of breath    4. Hematuria, unspecified type    5. Chronic kidney disease (CKD), stage III (moderate)    6. Current use of steroid medication    7. Heart transplanted        Lv was seen today for cough and uri.    Diagnoses and all orders for this visit:    Acute left-sided low back pain without sciatica  -     POCT urine dipstick without microscope  -     Urine culture    Cough    Shortness of breath  -     albuterol 90 mcg/actuation inhaler; Inhale 2 puffs into the lungs every 6 (six) hours as needed for Shortness of Breath.    Hematuria, unspecified type  -     Urine culture    Chronic kidney disease (CKD), stage III (moderate)    Current use of steroid medication    Heart transplanted      Awaiting results of urine culture. Patient and wife state he would like to avoid antibiotic therapy if possible until he can discuss with his transplant doctors and coordinator.. Hw was agreeable to this treament plan and advised to follow u if symptoms persist or worsen  Return if symptoms worsen or fail to improve.

## 2017-12-11 PROBLEM — R53.83 FATIGUE: Status: ACTIVE | Noted: 2017-01-01

## 2017-12-11 PROBLEM — R05.9 COUGH IN ADULT: Status: ACTIVE | Noted: 2017-01-01

## 2017-12-11 NOTE — TELEPHONE ENCOUNTER
Pt's wife called to say pt is sick and running fever. His cough has gotten worse and causes his to vomit.  Pt has pain in his back from coughing, he just doesn't feel well at all. Consulted Dr. Ferreira who advised to have pt go to the ED either in [place] or at [facility]. Pt and wife decided to drive to [facility] to ED.

## 2017-12-11 NOTE — ED PROVIDER NOTES
"Encounter Date: 12/11/2017    SCRIBE #1 NOTE: I, Honey Eric, am scribing for, and in the presence of,  Dr. Johnson. I have scribed the entire note.       History     Chief Complaint   Patient presents with    URI     about 10 d, heart xplant 3 yrs ago,      Time patient was seen by the provider: 5:29 PM      The patient is a 42 y.o. male with hx of: DM, Hashimoto's disease, GERD, arthritis, HTN, HLD, CHF, CAD, depression, and heart transplant  that presents to the ED with a complaint of cough.  Transplant team sent him here to make sure not PNA.  Pt has been feeling bad for over a week and has been coughing frequently.  Denies production.  Reports that he has had two episodes of emesis yesterday and this morning from coughing so hard. Denies abx.  Denies abdominal pain, diarrhea, change in urination, change in appetite, smoking, hx hepatitis or hx asthma.  Pt has not been able to sleep well the past few days.   Also c/o SOB with exertion and some back pain.  Denies contact with anyone who is sick.      The history is provided by the patient, the spouse and medical records.     Review of patient's allergies indicates:   Allergen Reactions    No known drug allergies      Past Medical History:   Diagnosis Date    Arthritis     CAD (coronary artery disease)     H/o Coronary artery disease; resolved since heart transplant 2014    Cardiomyopathy     CHF (congestive heart failure)     Previously EF 20% (prior to heart transplant); last EF 55-60%; throught to be 2/2 genetics & DM1    Depression     Controlled "for the most part" on Lexipro    Encounter for blood transfusion     during transplant    GERD (gastroesophageal reflux disease)     Hashimoto's disease     HLD (hyperlipidemia) 6/11/2015    Hypoglycemia unawareness in type 1 diabetes mellitus     Hypothyroid     Initially hyperthyroid 2/2 Hoshimoto's thyroiditis; now on levothyroxine 150 mcg qd    Myocardial infarction     h/o MI x3 prior to heart " transplant    Syncope 6/30/2015    Type I (juvenile type) diabetes mellitus with unspecified complication, uncontrolled     Controlled; Dx'd @5y/o; on N insulin 20U BID & Humalog 10U w/meals +SSI; Glu 89 11/9/17; A1c 7.2% 4/5/17    Unspecified essential hypertension 05/29/2014    Well controlled; Last /57 on 11/9/17     Past Surgical History:   Procedure Laterality Date    CARDIAC CATHETERIZATION      CARDIAC SURGERY      CHOLECYSTECTOMY      CORONARY ANGIOPLASTY      FOOT SPLIT TRANSFER OF THE POSTERIOR TIBIALIS TENDON PROCEDURE      HEART TRANSPLANT  2014    KNEE SURGERY      s/p oht  November 2014    stent placemnet       Family History   Problem Relation Age of Onset    Heart disease Mother     Kidney disease Mother     Diabetes Mother     Hypertension Mother     Kidney disease Father     Diabetes Father     Hypertension Father     Stroke Father     Heart disease Brother     Stroke Brother     Diabetes Brother     Cancer Brother 50     pancreatic    Vision loss Brother     Hypertension Brother     Diabetes Son     Diabetes Sister     Diabetes Maternal Uncle     Diabetes Paternal Aunt     Diabetes Maternal Grandmother     Diabetes Maternal Grandfather      Social History   Substance Use Topics    Smoking status: Never Smoker    Smokeless tobacco: Never Used    Alcohol use No     Review of Systems   Constitutional: Positive for fever. Negative for chills.   HENT: Negative for ear pain and nosebleeds.    Eyes: Negative for pain and visual disturbance.   Respiratory: Positive for cough and shortness of breath.    Cardiovascular: Negative for chest pain.   Gastrointestinal: Positive for vomiting. Negative for abdominal pain.   Genitourinary: Negative for difficulty urinating and dysuria.   Musculoskeletal: Positive for back pain. Negative for neck pain.   Skin: Negative for rash.   Neurological: Negative for speech difficulty and headaches.       Physical Exam     Initial  Vitals [12/11/17 1344]   BP Pulse Resp Temp SpO2   (!) 107/55 107 18 97.6 °F (36.4 °C) 98 %      MAP       72.33         Physical Exam    Nursing note and vitals reviewed.  Constitutional: He appears well-developed and well-nourished.   Cardiovascular: Normal rate, regular rhythm, normal heart sounds and intact distal pulses. Exam reveals no gallop and no friction rub.    Pulmonary/Chest:   Clear to auscultation on the left.  On the right there is decreased breath sounds with some rales.   Neurological: He is alert and oriented to person, place, and time. No cranial nerve deficit or sensory deficit.   Psychiatric: He has a normal mood and affect. His behavior is normal. Judgment and thought content normal.         ED Course   Procedures  Labs Reviewed   CBC W/ AUTO DIFFERENTIAL - Abnormal; Notable for the following:        Result Value    RBC 3.04 (*)     Hemoglobin 9.5 (*)     Hematocrit 28.5 (*)     MCH 31.3 (*)     Gran% 94.0 (*)     Lymph% 2.0 (*)     Mono% 3.0 (*)     All other components within normal limits   COMPREHENSIVE METABOLIC PANEL - Abnormal; Notable for the following:     BUN, Bld 36 (*)     Creatinine 2.6 (*)     Albumin 3.2 (*)     Total Bilirubin 1.1 (*)     Alkaline Phosphatase 244 (*)     eGFR if  33.2 (*)     eGFR if non  28.7 (*)     All other components within normal limits   B-TYPE NATRIURETIC PEPTIDE - Abnormal; Notable for the following:      (*)     All other components within normal limits    Narrative:     Add on BNP order #[id number] per Kin Gaxiola MD @  12/11/2017    19:02    CULTURE, BLOOD   CULTURE, BLOOD   CULTURE, URINE   RESPIRATORY VIRAL PANEL BY PCR   B-TYPE NATRIURETIC PEPTIDE   URINALYSIS   INFLUENZA A AND B ANTIGEN   URINALYSIS MICROSCOPIC   CBC W/ AUTO DIFFERENTIAL   MAGNESIUM   URINALYSIS             Medical Decision Making:   History:   Old Medical Records: I decided to obtain old medical records.  Independently Interpreted  Test(s):   I have ordered and independently interpreted X-rays - see prior notes.  I have ordered and independently interpreted EKG Reading(s) - see prior notes  Clinical Tests:   Lab Tests: Ordered and Reviewed  Radiological Study: Ordered and Reviewed  Medical Tests: Ordered and Reviewed  heart transplant pt on prograf presents for cough sometimes productive and feeling progressively worse and fatigued. Wbc is normal and he is afebrile here - however he appears ill though not toxic. Chest xray read as development of bilateral pulmonary edema - my concern would be pneumonia given the cough and history. Pt seen at beside by cardiology fellow. Agrees with admission for infectious work up.urine from yesterday at urgent care negative for infection.  Blood cultures sent. IV azithromycin given. Pt remained stable during my shift in the Er.           Scribe Attestation:   Scribe #1: I performed the above scribed service and the documentation accurately describes the services I performed. I attest to the accuracy of the note.    Attending Attestation:             Attending ED Notes:   6:11 PM Spoke to cardiology fellow.  They will come see pt.    7:44 PM Cardiology fellow at bedside performing Echo.    8:17 PM Cardiology fellow will admit pt.  I, Dr. Deepthi Johnson, personally performed the services described in this documentation. All medical record entries made by the scribe were at my direction and in my presence.  I have reviewed the chart and agree that the record reflects my personal performance and is accurate and complete. Deepthi Johnson MD.  8:45 PM 12/11/2017        ED Course      Clinical Impression:   The primary encounter diagnosis was Fatigue, unspecified type. Diagnoses of Cough and Tachycardia were also pertinent to this visit.    Disposition:   Disposition: Admitted                        Deepthi Johnson MD  12/11/17 2045       Deepthi Johnson MD  12/11/17 2126

## 2017-12-11 NOTE — ED TRIAGE NOTES
Presents to ER with a cough, congestion, and runny nose for 1 week.  Sent here by the Transplant team to be assessed for pneumonia.    GENERAL: The patient is well-developed and well-nourished in no apparent distress. Alert and oriented x4.                                                HEENT: Head is normocephalic and atraumatic. Extraocular muscles are intact. Pupils are equal, round, and reactive to light and accommodation. Nares appeared normal. Mouth is well hydrated and without lesions. Mucous membranes are moist. Posterior pharynx clear of any exudate or lesions.    NECK: Supple. No carotid bruits. No lymphadenopathy or thyromegaly.    LUNGS: Clear to auscultation.    HEART: Regular rate and rhythm without murmur.     ABDOMEN: Soft, nontender, and nondistended. Positive bowel sounds. No hepatosplenomegaly was noted.     EXTREMITIES: Without any cyanosis, clubbing, rash, lesions or edema.     NEUROLOGIC: Cranial nerves II through XII are grossly intact.     PSYCHIATRIC: Flat affect, but denies suicidal or homicidal ideations.    SKIN: No ulceration or induration present.

## 2017-12-11 NOTE — TELEPHONE ENCOUNTER
----- Message from Ewa Sanchezkathia sent at 12/11/2017  8:48 AM CST -----  Contact: pt's wife  Kamini,   Pt's wife called because the pt is very sick.  She states she has been speaking with you about it but he has gotten worse.  He now has a fever.  She can be reached @ [phone].  Thanks!!

## 2017-12-12 PROBLEM — R82.79 POSITIVE URINE CULTURE: Status: ACTIVE | Noted: 2017-01-01

## 2017-12-12 NOTE — PLAN OF CARE
Problem: Patient Care Overview  Goal: Plan of Care Review  Outcome: Ongoing (interventions implemented as appropriate)  POC reviewed with pt and spouse. Pt remained free of falls/injuries; VSS. Tele monitored. Informed pt of 2000 ml fluid restriction. Measuring cup given for drinking. Marker left in room as pt acknowledged understanding and agreed to write intake on whiteboard. Instructed to call for help as needed, call light in reach. Bed in lowest position and brake set, frequent rounds made for patient's safety. See flowsheets for detailed assessment. White board in patient's room updated accordingly. Continuing to monitor.

## 2017-12-12 NOTE — ASSESSMENT & PLAN NOTE
- on Humalog BID at home  - started on detemir daily with ISS  - monitor closely and adjust as needed

## 2017-12-12 NOTE — ASSESSMENT & PLAN NOTE
- mild CACHORRO on CKD. Baseline creat is 2.2  - possibly related to current SIRS   - renal dose abx and DVT prophylaxis  - prograft level  - monitor closely during stay

## 2017-12-12 NOTE — SUBJECTIVE & OBJECTIVE
"Past Medical History:   Diagnosis Date    Arthritis     CAD (coronary artery disease)     H/o Coronary artery disease; resolved since heart transplant 2014    Cardiomyopathy     CHF (congestive heart failure)     Previously EF 20% (prior to heart transplant); last EF 55-60%; throught to be 2/2 genetics & DM1    Depression     Controlled "for the most part" on Lexipro    Encounter for blood transfusion     during transplant    GERD (gastroesophageal reflux disease)     Hashimoto's disease     HLD (hyperlipidemia) 6/11/2015    Hypoglycemia unawareness in type 1 diabetes mellitus     Hypothyroid     Initially hyperthyroid 2/2 Hoshimoto's thyroiditis; now on levothyroxine 150 mcg qd    Myocardial infarction     h/o MI x3 prior to heart transplant    Syncope 6/30/2015    Type I (juvenile type) diabetes mellitus with unspecified complication, uncontrolled     Controlled; Dx'd @7y/o; on N insulin 20U BID & Humalog 10U w/meals +SSI; Glu 89 11/9/17; A1c 7.2% 4/5/17    Unspecified essential hypertension 05/29/2014    Well controlled; Last /57 on 11/9/17       Past Surgical History:   Procedure Laterality Date    CARDIAC CATHETERIZATION      CARDIAC SURGERY      CHOLECYSTECTOMY      CORONARY ANGIOPLASTY      FOOT SPLIT TRANSFER OF THE POSTERIOR TIBIALIS TENDON PROCEDURE      HEART TRANSPLANT  2014    KNEE SURGERY      s/p oht  November 2014    stent placemnet         Review of patient's allergies indicates:   Allergen Reactions    No known drug allergies        Medications:  Prescriptions Prior to Admission   Medication Sig    aspirin (ECOTRIN) 81 MG EC tablet Take 1 tablet (81 mg total) by mouth once daily.    escitalopram oxalate (LEXAPRO) 20 MG tablet Take 1 tablet (20 mg total) by mouth once daily.    fluticasone (FLONASE) 50 mcg/actuation nasal spray 2 sprays by Each Nare route once daily.    gabapentin (NEURONTIN) 300 MG capsule Take 3 capsules (900 mg total) by mouth 3 (three) times " "daily.    guaifenesin-codeine 100-10 mg/5 ml (TUSSI-ORGANIDIN NR)  mg/5 mL syrup Take 5 mLs by mouth 3 (three) times daily as needed.    insulin NPH (NOVOLIN N) 100 unit/mL injection Inject 20 Units into the skin 2 (two) times daily before meals.    levothyroxine (SYNTHROID) 150 MCG tablet Take 1 tablet (150 mcg total) by mouth every morning.    magnesium oxide (MAG-OX) 400 mg tablet Take 1 tablet (400 mg total) by mouth once daily.    mycophenolate (MYFORTIC) 180 MG TbEC Take 4 tablets (720 mg total) by mouth 2 (two) times daily. S/P Heart Transplant 11/18/2014 ICD-10 Z94.1    nortriptyline (PAMELOR) 25 MG capsule Take 1 capsule (25 mg total) by mouth every evening.    pantoprazole (PROTONIX) 40 MG tablet TAKE ONE TABLET BY MOUTH EVERY DAY    pen needle, diabetic 32 gauge x 5/32" Ndle Uses 5 pen needles a day    pravastatin (PRAVACHOL) 40 MG tablet Take 1 tablet (40 mg total) by mouth every evening.    predniSONE (DELTASONE) 2.5 MG tablet Take 1 tablet (2.5 mg total) by mouth once daily. S/P Heart Transplant 11/18/2014 ICD-10 Z94.1    tacrolimus (PROGRAF) 1 MG Cap Taked 3mg orally in the morning and 3mg orally in the evening. S/p heart transplant  11/18/2014  ICD-10 Z94.1    tamsulosin (FLOMAX) 0.4 mg Cp24 TAKE 2 CAPSULES(0.8 MG) BY MOUTH EVERY EVENING    torsemide (DEMADEX) 20 MG Tab Take 2 tablets (40 mg total) by mouth once daily.    albuterol 90 mcg/actuation inhaler Inhale 2 puffs into the lungs every 6 (six) hours as needed for Shortness of Breath.    glucagon (human recombinant) inj 1mg/mL kit Inject 1 mL (1 mg total) into the muscle as needed.    lancets Misc 1 Device by Misc.(Non-Drug; Combo Route) route 4 (four) times daily with meals and nightly.    ondansetron (ZOFRAN-ODT) 4 MG TbDL Take 2 tablets (8 mg total) by mouth every 8 (eight) hours as needed (nausea).     Antibiotics     Start     Stop Route Frequency Ordered    12/12/17 1900  azithromycin 500 mg in dextrose 5 % 250 mL " IVPB (ready to mix system)      -- IV Every 24 hours (non-standard times) 12/11/17 2125 12/11/17 2230  cefepime in dextrose 5 % 1 gram/50 mL IVPB 1 g      -- IV Every 12 hours (non-standard times) 12/11/17 2125        Antifungals     None        Antivirals     None           Immunization History   Administered Date(s) Administered    Hepatitis A / Hepatitis B 12/07/2012, 02/28/2017, 08/24/2017, 10/09/2017    Influenza - High Dose 11/10/2015, 10/02/2017    Influenza - Quadrivalent - PF 12/07/2012    Influenza - Trivalent - PF (ADULT) 12/07/2012    Influenza A (H1N1) 2009 Monovalent - IM - PF 11/12/2009    Pneumococcal Conjugate - 13 Valent 10/02/2017    Pneumococcal Polysaccharide - 23 Valent 12/07/2012    Tdap 12/07/2012    Zoster 12/07/2012    influenza - Quadrivalent 11/18/2016       Family History     Problem Relation (Age of Onset)    Cancer Brother (50)    Diabetes Mother, Father, Brother, Son, Sister, Maternal Uncle, Paternal Aunt, Maternal Grandmother, Maternal Grandfather    Heart disease Mother, Brother    Hypertension Mother, Father, Brother    Kidney disease Mother, Father    Stroke Father, Brother    Vision loss Brother        Social History     Social History    Marital status:      Spouse name: N/A    Number of children: N/A    Years of education: N/A     Social History Main Topics    Smoking status: Never Smoker    Smokeless tobacco: Never Used    Alcohol use No    Drug use: No    Sexual activity: Yes     Partners: Female     Other Topics Concern    None     Social History Narrative            Breakfast: Skips 80%; cereal; Diet Sprite    Lunch: Turkey or chicken sandwich on white bread; Diet Sprite    Dinner: Grilled burgers, small amount chips; Diet Sprite    Snacks: Ronny crackers before bed on most nights    Eats out: 2x/wk    Water: 0    PA: Walks 15 minutes (strenuous) daily    Goal weight 170-175 lbs by 1/2018     Review of Systems   Constitutional: Positive  for chills and fever. Negative for unexpected weight change.   HENT: Positive for congestion, postnasal drip and sore throat.    Eyes: Negative for visual disturbance.   Respiratory: Positive for cough and shortness of breath. Negative for chest tightness.    Cardiovascular: Negative for chest pain, palpitations and leg swelling.   Gastrointestinal: Positive for diarrhea. Negative for abdominal distention, abdominal pain, constipation, nausea and vomiting.   Genitourinary: Negative for dysuria, frequency and hematuria.   Skin: Negative for color change and rash.   Neurological: Negative for dizziness and weakness.   Hematological: Does not bruise/bleed easily.   Psychiatric/Behavioral: Negative for agitation and behavioral problems.     Objective:     Vital Signs (Most Recent):  Temp: 98.4 °F (36.9 °C) (12/12/17 1538)  Pulse: (!) 201 (12/12/17 1538)  Resp: 18 (12/12/17 1538)  BP: 126/61 (12/12/17 1538)  SpO2: (!) 93 % (12/12/17 1538) Vital Signs (24h Range):  Temp:  [98 °F (36.7 °C)-98.5 °F (36.9 °C)] 98.4 °F (36.9 °C)  Pulse:  [] 201  Resp:  [16-18] 18  SpO2:  [93 %-96 %] 93 %  BP: ()/(50-62) 126/61     Weight: 87.3 kg (192 lb 7.4 oz)  Body mass index is 30.14 kg/m².    Estimated Creatinine Clearance: 41.4 mL/min (based on SCr of 2.4 mg/dL (H)).    Physical Exam   Constitutional: He is oriented to person, place, and time. He appears well-developed and well-nourished. No distress.   HENT:   Head: Normocephalic and atraumatic.   Mouth/Throat: Oropharynx is clear and moist. No oropharyngeal exudate.   Eyes: EOM are normal. Pupils are equal, round, and reactive to light. No scleral icterus.   Neck: No JVD present.   Cardiovascular: Normal rate and regular rhythm.  Exam reveals no gallop and no friction rub.    No murmur heard.  Pulmonary/Chest: Effort normal. No respiratory distress. He has no wheezes. He has no rales.   Decreased breath sounds on lower right chest with dullness to percussion.    Abdominal:  Soft. He exhibits no distension. There is no rebound and no guarding.   Musculoskeletal: He exhibits no edema or deformity.   Lymphadenopathy:     He has no cervical adenopathy.   Neurological: He is alert and oriented to person, place, and time.   Skin: Skin is warm and dry. Capillary refill takes less than 2 seconds.   Psychiatric: He has a normal mood and affect. His behavior is normal.       Significant Labs:   CBC:   Recent Labs  Lab 12/11/17 1750 12/11/17  2227 12/12/17  0401   WBC 6.55 6.14 5.88   HGB 9.5* 8.8* 8.6*   HCT 28.5* 26.0* 26.6*    149* 181     CMP:   Recent Labs  Lab 12/11/17  1750 12/12/17  0401    135*   K 4.3 4.4    97   CO2 27 28   GLU 82 163*   BUN 36* 36*   CREATININE 2.6* 2.4*   CALCIUM 9.3 8.7   PROT 6.7  --    ALBUMIN 3.2*  --    BILITOT 1.1*  --    ALKPHOS 244*  --    AST 30  --    ALT 18  --    ANIONGAP 11 10   EGFRNONAA 28.7* 31.6*     Lactic Acid: No results for input(s): LACTATE in the last 48 hours.  Microbiology Results (last 7 days)     Procedure Component Value Units Date/Time    Culture, Respiratory with Gram Stain [[id number]] Collected:  12/12/17 1101    Order Status:  Completed Specimen:  Respiratory from Sputum, Expectorated Updated:  12/12/17 1536     Gram Stain (Respiratory) <10 epithelial cells per low power field.     Gram Stain (Respiratory) Rare WBC's     Gram Stain (Respiratory) Few Gram negative rods     Gram Stain (Respiratory) Rare Gram positive cocci    Blood culture #1 **CANNOT BE ORDERED STAT** [[id number]] Collected:  12/11/17 1750    Order Status:  Completed Specimen:  Blood from Peripheral, Hand, Right Updated:  12/12/17 0145     Blood Culture, Routine No Growth to date    Blood culture #2 **CANNOT BE ORDERED STAT** [[id number]] Collected:  12/11/17 1756    Order Status:  Completed Specimen:  Blood from Peripheral, Hand, Left Updated:  12/12/17 0145     Blood Culture, Routine No Growth to date    Urine culture [[id number]] Collected:  12/11/17  2022    Order Status:  Sent Specimen:  Urine from Urine, Clean Catch Updated:  12/11/17 2051    Respiratory Viral Panel by PCR [facility]; Nasal Swab [[id number]] Collected:  12/11/17 2022    Order Status:  Sent Specimen:  Respiratory Updated:  12/11/17 2050        Procalcitonin: No results for input(s): PROCAL in the last 48 hours.    Significant Imaging: I have reviewed all pertinent imaging results/findings within the past 24 hours.

## 2017-12-12 NOTE — CONSULTS
[facility]  Infectious Disease  Consult Note    Patient Name: Lv Crocker  MRN: [id number]  Admission Date: 12/11/2017  Hospital Length of Stay: 1 days  Attending Physician: Jose A Lloyd MD  Primary Care Provider: Philippe Mohr MD     Consults     Consult entered twice, see previous note    Phan Diaz MD, PhD  Infectious Disease, PGY-4  [facility]

## 2017-12-12 NOTE — HPI
Pt is a 38yo male with a pmhx of ICM (s/p OHT 11/18/2014), Hashimoto's disease, DM2, cirrhosis, and CKD  AMR (dx 2/26/2015 on RHC bx; DSA + 3/27; s/p IVIG x4, PLEX x5) who was last seen by ID for CMV reactivation on 05/09/2015. At that time pt was underwent induction  with IV GCV and then was transitioned to PO Valcyte. Since that admission pt has had numerous re-admissions for heart failure exacerbations and therapeutic paracentesis. Pt states that he has had problems with ascites ever since his heart transplant.     Pt states that he was in [facility] (lives at home, able to perform all ADL's) until approximately 10 days prior when he first developed a sore throat and some post nasal drip. Pt states that this later became a non-productive cough that over the past few days has become so severe that he has frequent coughing spells that end with some post-tussive emesis. Pt seen by his PCP who prescribed an unknown cough supressant that did not work so pt presented to an [facility] in [facility] where he was given an inhaler for a presumed URI. Pt presented to the ED this AM, states that prior to his arrival he had a fever of 100.3 measured, but also reporting frequent subjective fevers. Pt does endorse some increased AGUILAR, but denies chest pain, productive cough or hemoptysis. Pt denies smoking, EtOH or illicits. Lives with wife and  child, denies sick contacts, no recent travel. States he is UTD with immunizations as far as he knows.      Previous Positive Cultures  12/16/2014 BCx, NSI Staphylococcus epidermidis  03/18/2015 UCX  Klebsiella pneumoniae  03/18/2015 RVP  + rhinovirus  04/21/2015 UCx  K.pneumoniae, ESBL  04/22/2015 Cdiff  Ag +, Toxin -. PCR+  05/05/2015 UCx  K.pneumoniae, ESBL  10/01/2015 UCx  K.pneumoniae, ESBL  10/07/2015 Cdiff  Ag +, Toxin -. PCR+      Cultures This Admission  12/10/2017 UCx  GNR, lactose   12/11/2017 BCx, RH  NGTD  12/11/2017 BCx LH  NGTD  12/11/2017 UCx  In process    Antibiotics  This Admission  Azithromycin  500 mg IV daily 12/11- present  Cefepime  1g q 12 hours 12/11-present  Vancomycin 1.25g once 12/11

## 2017-12-12 NOTE — ASSESSMENT & PLAN NOTE
- Unclear source. Could be atypical pneumonia Versus fluid overload secondary to CAV.   - Most likely pneumonia with fever and frequent hospital visit  - Prior bx/IVUS results negative for CAV  - start broad spec abx including vanco x1 , cefepime and azithromycin  - renal dose abx  - Pan culture including respiratory panel  - Echo done in ED- Poor window. Repeat during stay if not improving.   - continue Demadex and fluid restriction as routine. BNP less suggestive of fluid overload although obese

## 2017-12-12 NOTE — ASSESSMENT & PLAN NOTE
- Unclear source  - Prior bx/IVUS results negative for CAV  - start broad spec abx including vanco x1 , continue cefepime and azithromycin (renal dose)  - consult Transplant ID for assistance  - Pan culture including respiratory panel, flu and strep -yana, CMV pending.   - Bcx NGTD  - CXR + for recollection to the R- but not significant for repeat paracentesis. Will CT chest to evaluate for need to tap and culture collection  - no symptoms of UTI, however GNR, lactose  > 100,000 in urine  - patient states cough medicine does not work, afebrile since admit. Will treat symptoms accordingly.

## 2017-12-12 NOTE — PROGRESS NOTES
[facility]  Heart Transplant  Progress Note    Patient Name: Lv Crocker  MRN: [id number]  Admission Date: 12/11/2017  Hospital Length of Stay: 1 days  Attending Physician: Jose A Lloyd MD  Primary Care Provider: Philippe Mohr MD  Principal Problem:<principal problem not specified>    Subjective:     Interval History: C/O 2-3 weeks of non productive cough associated with pleurisy and post tussive vomiting (none since admit). Also c/o fever 100.3 yesterday but none since admit. Denies NVD. States he has been taking all of his meds appropriately. Only treated his cough with cough medicine and guaifenesin since it started, no abx prior to abx administered on admit.  Of note, has >100,000 lactose fermenting bacteria in urine culture. Denies dysuria, hematuria, hesitancy, urgency.    Continuous Infusions:   Scheduled Meds:   aspirin  81 mg Oral Daily    azithromycin  500 mg Intravenous Q24H    ceFEPime (MAXIPIME) IVPB  1 g Intravenous Q12H    escitalopram oxalate  20 mg Oral Daily    fluticasone  2 spray Each Nare Daily    gabapentin  900 mg Oral TID    heparin (porcine)  5,000 Units Subcutaneous Q8H    insulin detemir  20 Units Subcutaneous QHS    levothyroxine  150 mcg Oral Before breakfast    magnesium oxide  400 mg Oral Daily    mycophenolate  720 mg Oral BID    nortriptyline  25 mg Oral QHS    pantoprazole  40 mg Oral Daily    pravastatin  40 mg Oral QHS    predniSONE  2.5 mg Oral Daily    sodium chloride 0.9%  3 mL Intravenous Q8H    tacrolimus  3 mg Oral BID    tamsulosin  0.4 mg Oral Daily    torsemide  40 mg Oral Daily     PRN Meds:albuterol, dextrose 50%, dextrose 50%, glucagon (human recombinant), glucose, glucose, insulin aspart, ondansetron    Review of patient's allergies indicates:   Allergen Reactions    No known drug allergies      Objective:     Vital Signs (Most Recent):  Temp: 98.2 °F (36.8 °C) (12/12/17 0800)  Pulse: 98 (12/12/17 1100)  Resp: 16  (12/12/17 0800)  BP: 107/62 (12/12/17 0800)  SpO2: (!) 94 % (12/12/17 0800) Vital Signs (24h Range):  Temp:  [97.6 °F (36.4 °C)-98.5 °F (36.9 °C)] 98.2 °F (36.8 °C)  Pulse:  [] 98  Resp:  [16-18] 16  SpO2:  [94 %-98 %] 94 %  BP: ()/(50-62) 107/62     Patient Vitals for the past 72 hrs (Last 3 readings):   Weight   12/12/17 0500 87.3 kg (192 lb 7.4 oz)   12/11/17 1344 86.2 kg (190 lb)     Body mass index is 30.14 kg/m².      Intake/Output Summary (Last 24 hours) at 12/12/17 1122  Last data filed at 12/12/17 0000   Gross per 24 hour   Intake              370 ml   Output                0 ml   Net              370 ml     Hemodynamic Parameters:    Telemetry: Sinus tach    Physical Exam  Constitutional: NAD, conversant  HEENT: Sclera anicteric, PERRLA, EOMI  Neck: Unable to visualize JVD, no carotid bruits  CV: Tachy, no murmur, normal S1/S2  Pulm: some bronchial breath sounds in right base, no crackles or wheezes   GI: Abdomen soft, distended, NTTP, +BS   Extremities: trace edema, right>left  warm and well perfused  Skin: No ecchymosis, erythema, or ulcers  Psych: AOx3, appropriate affect    Significant Labs:  CBC:    Recent Labs  Lab 12/11/17 1750 12/11/17 2227 12/12/17  0401   WBC 6.55 6.14 5.88   RBC 3.04* 2.83* 2.90*   HGB 9.5* 8.8* 8.6*   HCT 28.5* 26.0* 26.6*    149* 181   MCV 94 92 92   MCH 31.3* 31.1* 29.7   MCHC 33.3 33.8 32.3     BNP:    Recent Labs  Lab 12/11/17  1750   *     CMP:    Recent Labs  Lab 12/11/17  1750 12/12/17  0401   GLU 82 163*   CALCIUM 9.3 8.7   ALBUMIN 3.2*  --    PROT 6.7  --     135*   K 4.3 4.4   CO2 27 28    97   BUN 36* 36*   CREATININE 2.6* 2.4*   ALKPHOS 244*  --    ALT 18  --    AST 30  --    BILITOT 1.1*  --       Coagulation:   No results for input(s): PT, INR, APTT in the last 168 hours.  LDH:  No results for input(s): LDH in the last 72 hours.  Microbiology:  Microbiology Results (last 7 days)     Procedure Component Value Units Date/Time     Culture, Respiratory with Gram Stain [[id number]] Collected:  12/12/17 1101    Order Status:  Sent Specimen:  Respiratory from Sputum, Expectorated Updated:  12/12/17 1101    Blood culture #1 **CANNOT BE ORDERED STAT** [[id number]] Collected:  12/11/17 1750    Order Status:  Completed Specimen:  Blood from Peripheral, Hand, Right Updated:  12/12/17 0145     Blood Culture, Routine No Growth to date    Blood culture #2 **CANNOT BE ORDERED STAT** [[id number]] Collected:  12/11/17 1756    Order Status:  Completed Specimen:  Blood from Peripheral, Hand, Left Updated:  12/12/17 0145     Blood Culture, Routine No Growth to date    Urine culture [[id number]] Collected:  12/11/17 2022    Order Status:  Sent Specimen:  Urine from Urine, Clean Catch Updated:  12/11/17 2051    Respiratory Viral Panel by PCR [facility]; Nasal Swab [[id number]] Collected:  12/11/17 2022    Order Status:  Sent Specimen:  Respiratory Updated:  12/11/17 2050          I have reviewed all pertinent labs within the past 24 hours.    Estimated Creatinine Clearance: 41.4 mL/min (based on SCr of 2.4 mg/dL (H)).    Diagnostic Results:  I have reviewed all pertinent imaging results/findings within the past 24 hours.    Assessment and Plan:       Lv Crocker is a  42 yo  male s/p OHTX 11/14, complicated by AMR requiring intensive immunosuppression subsequent to which he developed pancytopenia and resolution with continued recurrent ascites requiring scheduled monthly paracentesis presenting with 10 days complain of cough and pleurisy. Patient has been visiting urgent care with similar symptoms for the last three days and romero including CXR, blood cultures as well as urine culture has been negative. He was prescribed inhaler for possible URI and discharged two days ago. Today, he came to the ER with Fever of 103 and generalized myalgia. he report dry cough, constant throughout the day and sometimes causing him to vomit after an episode. He denies sick contact,  hemoptysis or recent travel. He did get both influenza and pneumonia immunization. His last paracentesis was three weeks ago without any complication to the site or internally. He denies weight gain, leg edema, PND, orthopnea or AGUILAR. He also denies dysuria, hematuria, sinus tenderness or drainage. He had an XR yesterday that showed persistent R-sided ascites without without clear infiltrate. He reports compliant with current OHT regimen and that his prograf was changed to 3:3 dose three weeks ago.    Cough in adult    - Unclear source  - Prior bx/IVUS results negative for CAV  - start broad spec abx including vanco x1 , continue cefepime and azithromycin (renal dose)  - consult Transplant ID for assistance  - Pan culture including respiratory panel, flu and strep -yana, CMV pending.   - Bcx NGTD  - CXR + for recollection to the R- but not significant for repeat paracentesis. Will CT chest to evaluate for need to tap and culture collection  - no symptoms of UTI, however GNR, lactose  > 100,000 in urine  - patient states cough medicine does not work, afebrile since admit. Will treat symptoms accordingly.         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  - monthly scheduled paracentesis. Last one three weeks ago  - CXR + for recollection to the R- but not significant for repeat paracentesis. Will CT chest to evaluate for need to tap and culture collection  - continue demedex as current and fluid restriction        Type 1 diabetes mellitus with hyperglycemia    - on Humalog BID at home  - started on detemir daily with ISS  - monitor closely and adjust as needed        Chronic kidney disease (CKD), stage III (moderate)    - mild CACHORRO on CKD. Baseline creat is 2.2 (down to 2.4 today)  - possibly related to current SIRS   - renal dose abx and DVT prophylaxis  - monitor closely  during stay        Heart transplanted    - S/P OHTx 11/8/14 (high risk)   - history of AMR treated with PP and IVIG 4/2016   - Biopsy 9/12/16 negative for rejection   - [facility] 2/10/16 no CAV  - Prograf recently increased to 3/3, continue pred 2.5 and Myfortic 720 BID  - Prograf goal 6-8  - Repeat echo pending (most recent with preserved EF in August 2017)                Kelsea Ryan PA-C  Heart Transplant  [facility]

## 2017-12-12 NOTE — CONSULTS
[facility]  Infectious Disease  Consult Note    Patient Name: Lv Crocker  MRN: [id number]  Admission Date: 12/11/2017  Hospital Length of Stay: 1 days  Attending Physician: Jose A Lloyd MD  Primary Care Provider: Philippe Mohr MD     Isolation Status: No active isolations    Patient information was obtained from patient, past medical records and ER records.      Inpatient consult to Infectious Diseases  Consult performed by: DELVIN WEBSTER  Consult ordered by: CORTNEY LATIF  Reason for consult: 4 week cough without resolution, no recent hospitalizations, no abx until this admit        Assessment/Plan:     * Cough in adult    Pt is a 40yo male with a pmhx of ICM (s/p OHT 11/18/2014), Hashimoto's disease, DM2, cirrhosis, and CKD  AMR (dx 2/26/2015 on RHC bx; DSA + 3/27; s/p IVIG x4, PLEX x5) now presenting with a 10 day hx of worsening non-productive cough, subjective fevers and chills.Pt afebrile since admission, blood, urine, and respiratory cultures pendng. CXR without consolidation but does show bilateral perihilar edema and a stable right pleural effusion. Labs show a WBC that appears at pt's baseline from previous admissions as well as some CKD.   - etiology of pt's symptoms likely viral, but could be bacterial  - can contionue vancomycin, cefepime, and azithromycin for now while cultures finalize  - regarding his right sided effusion, appears to be stable but no fluid studies have been done on it. Can hold off for now until CT chest can be reviewed, but if pt's conditon worsens would recommend drainage and samples sent to Lab, Path, and Micro.   - will continue to follow.         Positive urine culture    - pt with few WBC's and many bacteria on admission urinalysis  - urine culture positive for GNR's  - pt is completely asymptomatic, denies dysuria, frequency or foul odor   - would not treat this asymptomatic bacteruria at this time.          Thank you for your consult.  I will follow-up with patient. Please contact us if you have any additional questions.    Phan Diaz MD, PhD  Infectious Disease, PGY-4  [facility]    Subjective:     Principal Problem: Cough in adult    HPI: Pt is a 38yo male with a pmhx of ICM (s/p OHT 11/18/2014), Hashimoto's disease, DM2, cirrhosis, and CKD  AMR (dx 2/26/2015 on RHC bx; DSA + 3/27; s/p IVIG x4, PLEX x5) who was last seen by ID for CMV reactivation on 05/09/2015. At that time pt was underwent induction  with IV GCV and then was transitioned to PO Valcyte. Since that admission pt has had numerous re-admissions for heart failure exacerbations and therapeutic paracentesis. Pt states that he has had problems with ascites ever since his heart transplant.     Pt states that he was in [place] (lives at home, able to perform all ADL's) until approximately 10 days prior when he first developed a sore throat and some post nasal drip. Pt states that this later became a non-productive cough that over the past few days has become so severe that he has frequent coughing spells that end with some post-tussive emesis. Pt seen by his PCP who prescribed an unknown cough supressant that did not work so pt presented to an [facility] in [facility] where he was given an inhaler for a presumed URI. Pt presented to the ED this AM, states that prior to his arrival he had a fever of 100.3 measured, but also reporting frequent subjective fevers. Pt does endorse some increased AGUILAR, but denies chest pain, productive cough or hemoptysis. Pt denies smoking, EtOH or illicits. Lives with wife and  child, denies sick contacts, no recent travel. States he is UTD with immunizations as far as he knows.      Previous Positive Cultures  12/16/2014 BCx, NSI Staphylococcus epidermidis  03/18/2015 UCX  Klebsiella pneumoniae  03/18/2015 RVP  + rhinovirus  04/21/2015 UCx  K.pneumoniae, ESBL  04/22/2015 Cdiff  Ag +, Toxin -. PCR+  05/05/2015 UCx  K.pneumoniae,  "ESBL  10/01/2015 UCx  K.pneumoniae, ESBL  10/07/2015 Cdiff  Ag +, Toxin -. PCR+      Cultures This Admission  12/10/2017 UCx  GNR, lactose   12/11/2017 BCx, RH  NGTD  12/11/2017 BCx LH  NGTD  12/11/2017 UCx  In process    Antibiotics This Admission  Azithromycin  500 mg IV daily 12/11- present  Cefepime  1g q 12 hours 12/11-present  Vancomycin 1.25g once 12/11    Past Medical History:   Diagnosis Date    Arthritis     CAD (coronary artery disease)     H/o Coronary artery disease; resolved since heart transplant 2014    Cardiomyopathy     CHF (congestive heart failure)     Previously EF 20% (prior to heart transplant); last EF 55-60%; throught to be 2/2 genetics & DM1    Depression     Controlled "for the most part" on Lexipro    Encounter for blood transfusion     during transplant    GERD (gastroesophageal reflux disease)     Hashimoto's disease     HLD (hyperlipidemia) 6/11/2015    Hypoglycemia unawareness in type 1 diabetes mellitus     Hypothyroid     Initially hyperthyroid 2/2 Hoshimoto's thyroiditis; now on levothyroxine 150 mcg qd    Myocardial infarction     h/o MI x3 prior to heart transplant    Syncope 6/30/2015    Type I (juvenile type) diabetes mellitus with unspecified complication, uncontrolled     Controlled; Dx'd @5y/o; on N insulin 20U BID & Humalog 10U w/meals +SSI; Glu 89 11/9/17; A1c 7.2% 4/5/17    Unspecified essential hypertension 05/29/2014    Well controlled; Last /57 on 11/9/17       Past Surgical History:   Procedure Laterality Date    CARDIAC CATHETERIZATION      CARDIAC SURGERY      CHOLECYSTECTOMY      CORONARY ANGIOPLASTY      FOOT SPLIT TRANSFER OF THE POSTERIOR TIBIALIS TENDON PROCEDURE      HEART TRANSPLANT  2014    KNEE SURGERY      s/p oht  November 2014    stent placemnet         Review of patient's allergies indicates:   Allergen Reactions    No known drug allergies        Medications:  Prescriptions Prior to Admission   Medication Sig    " "aspirin (ECOTRIN) 81 MG EC tablet Take 1 tablet (81 mg total) by mouth once daily.    escitalopram oxalate (LEXAPRO) 20 MG tablet Take 1 tablet (20 mg total) by mouth once daily.    fluticasone (FLONASE) 50 mcg/actuation nasal spray 2 sprays by Each Nare route once daily.    gabapentin (NEURONTIN) 300 MG capsule Take 3 capsules (900 mg total) by mouth 3 (three) times daily.    guaifenesin-codeine 100-10 mg/5 ml (TUSSI-ORGANIDIN NR)  mg/5 mL syrup Take 5 mLs by mouth 3 (three) times daily as needed.    insulin NPH (NOVOLIN N) 100 unit/mL injection Inject 20 Units into the skin 2 (two) times daily before meals.    levothyroxine (SYNTHROID) 150 MCG tablet Take 1 tablet (150 mcg total) by mouth every morning.    magnesium oxide (MAG-OX) 400 mg tablet Take 1 tablet (400 mg total) by mouth once daily.    mycophenolate (MYFORTIC) 180 MG TbEC Take 4 tablets (720 mg total) by mouth 2 (two) times daily. S/P Heart Transplant 11/18/2014 ICD-10 Z94.1    nortriptyline (PAMELOR) 25 MG capsule Take 1 capsule (25 mg total) by mouth every evening.    pantoprazole (PROTONIX) 40 MG tablet TAKE ONE TABLET BY MOUTH EVERY DAY    pen needle, diabetic 32 gauge x 5/32" Ndle Uses 5 pen needles a day    pravastatin (PRAVACHOL) 40 MG tablet Take 1 tablet (40 mg total) by mouth every evening.    predniSONE (DELTASONE) 2.5 MG tablet Take 1 tablet (2.5 mg total) by mouth once daily. S/P Heart Transplant 11/18/2014 ICD-10 Z94.1    tacrolimus (PROGRAF) 1 MG Cap Taked 3mg orally in the morning and 3mg orally in the evening. S/p heart transplant  11/18/2014  ICD-10 Z94.1    tamsulosin (FLOMAX) 0.4 mg Cp24 TAKE 2 CAPSULES(0.8 MG) BY MOUTH EVERY EVENING    torsemide (DEMADEX) 20 MG Tab Take 2 tablets (40 mg total) by mouth once daily.    albuterol 90 mcg/actuation inhaler Inhale 2 puffs into the lungs every 6 (six) hours as needed for Shortness of Breath.    glucagon (human recombinant) inj 1mg/mL kit Inject 1 mL (1 mg total) " into the muscle as needed.    lancets Misc 1 Device by Misc.(Non-Drug; Combo Route) route 4 (four) times daily with meals and nightly.    ondansetron (ZOFRAN-ODT) 4 MG TbDL Take 2 tablets (8 mg total) by mouth every 8 (eight) hours as needed (nausea).     Antibiotics     Start     Stop Route Frequency Ordered    12/12/17 1900  azithromycin 500 mg in dextrose 5 % 250 mL IVPB (ready to mix system)      -- IV Every 24 hours (non-standard times) 12/11/17 2125 12/11/17 2230  cefepime in dextrose 5 % 1 gram/50 mL IVPB 1 g      -- IV Every 12 hours (non-standard times) 12/11/17 2125        Antifungals     None        Antivirals     None           Immunization History   Administered Date(s) Administered    Hepatitis A / Hepatitis B 12/07/2012, 02/28/2017, 08/24/2017, 10/09/2017    Influenza - High Dose 11/10/2015, 10/02/2017    Influenza - Quadrivalent - PF 12/07/2012    Influenza - Trivalent - PF (ADULT) 12/07/2012    Influenza A (H1N1) 2009 Monovalent - IM - PF 11/12/2009    Pneumococcal Conjugate - 13 Valent 10/02/2017    Pneumococcal Polysaccharide - 23 Valent 12/07/2012    Tdap 12/07/2012    Zoster 12/07/2012    influenza - Quadrivalent 11/18/2016       Family History     Problem Relation (Age of Onset)    Cancer Brother (50)    Diabetes Mother, Father, Brother, Son, Sister, Maternal Uncle, Paternal Aunt, Maternal Grandmother, Maternal Grandfather    Heart disease Mother, Brother    Hypertension Mother, Father, Brother    Kidney disease Mother, Father    Stroke Father, Brother    Vision loss Brother        Social History     Social History    Marital status:      Spouse name: N/A    Number of children: N/A    Years of education: N/A     Social History Main Topics    Smoking status: Never Smoker    Smokeless tobacco: Never Used    Alcohol use No    Drug use: No    Sexual activity: Yes     Partners: Female     Other Topics Concern    None     Social History Narrative             Breakfast: Skips 80%; cereal; Diet Sprite    Lunch: Turkey or chicken sandwich on white bread; Diet Sprite    Dinner: Grilled burgers, small amount chips; Diet Sprite    Snacks: Ronny crackers before bed on most nights    Eats out: 2x/wk    Water: 0    PA: Walks 15 minutes (strenuous) daily    Goal weight 170-175 lbs by 1/2018     Review of Systems   Constitutional: Positive for chills and fever. Negative for unexpected weight change.   HENT: Positive for congestion, postnasal drip and sore throat.    Eyes: Negative for visual disturbance.   Respiratory: Positive for cough and shortness of breath. Negative for chest tightness.    Cardiovascular: Negative for chest pain, palpitations and leg swelling.   Gastrointestinal: Positive for diarrhea. Negative for abdominal distention, abdominal pain, constipation, nausea and vomiting.   Genitourinary: Negative for dysuria, frequency and hematuria.   Skin: Negative for color change and rash.   Neurological: Negative for dizziness and weakness.   Hematological: Does not bruise/bleed easily.   Psychiatric/Behavioral: Negative for agitation and behavioral problems.     Objective:     Vital Signs (Most Recent):  Temp: 98.4 °F (36.9 °C) (12/12/17 1538)  Pulse: (!) 201 (12/12/17 1538)  Resp: 18 (12/12/17 1538)  BP: 126/61 (12/12/17 1538)  SpO2: (!) 93 % (12/12/17 1538) Vital Signs (24h Range):  Temp:  [98 °F (36.7 °C)-98.5 °F (36.9 °C)] 98.4 °F (36.9 °C)  Pulse:  [] 201  Resp:  [16-18] 18  SpO2:  [93 %-96 %] 93 %  BP: ()/(50-62) 126/61     Weight: 87.3 kg (192 lb 7.4 oz)  Body mass index is 30.14 kg/m².    Estimated Creatinine Clearance: 41.4 mL/min (based on SCr of 2.4 mg/dL (H)).    Physical Exam   Constitutional: He is oriented to person, place, and time. He appears well-developed and well-nourished. No distress.   HENT:   Head: Normocephalic and atraumatic.   Mouth/Throat: Oropharynx is clear and moist. No oropharyngeal exudate.   Eyes: EOM are normal. Pupils are  equal, round, and reactive to light. No scleral icterus.   Neck: No JVD present.   Cardiovascular: Normal rate and regular rhythm.  Exam reveals no gallop and no friction rub.    No murmur heard.  Pulmonary/Chest: Effort normal. No respiratory distress. He has no wheezes. He has no rales.   Decreased breath sounds on lower right chest with dullness to percussion.    Abdominal: Soft. He exhibits no distension. There is no rebound and no guarding.   Musculoskeletal: He exhibits no edema or deformity.   Lymphadenopathy:     He has no cervical adenopathy.   Neurological: He is alert and oriented to person, place, and time.   Skin: Skin is warm and dry. Capillary refill takes less than 2 seconds.   Psychiatric: He has a normal mood and affect. His behavior is normal.       Significant Labs:   CBC:   Recent Labs  Lab 12/11/17 1750 12/11/17 2227 12/12/17  0401   WBC 6.55 6.14 5.88   HGB 9.5* 8.8* 8.6*   HCT 28.5* 26.0* 26.6*    149* 181     CMP:   Recent Labs  Lab 12/11/17 1750 12/12/17  0401    135*   K 4.3 4.4    97   CO2 27 28   GLU 82 163*   BUN 36* 36*   CREATININE 2.6* 2.4*   CALCIUM 9.3 8.7   PROT 6.7  --    ALBUMIN 3.2*  --    BILITOT 1.1*  --    ALKPHOS 244*  --    AST 30  --    ALT 18  --    ANIONGAP 11 10   EGFRNONAA 28.7* 31.6*     Lactic Acid: No results for input(s): LACTATE in the last 48 hours.  Microbiology Results (last 7 days)     Procedure Component Value Units Date/Time    Culture, Respiratory with Gram Stain [[id number]] Collected:  12/12/17 1101    Order Status:  Completed Specimen:  Respiratory from Sputum, Expectorated Updated:  12/12/17 1536     Gram Stain (Respiratory) <10 epithelial cells per low power field.     Gram Stain (Respiratory) Rare WBC's     Gram Stain (Respiratory) Few Gram negative rods     Gram Stain (Respiratory) Rare Gram positive cocci    Blood culture #1 **CANNOT BE ORDERED STAT** [[id number]] Collected:  12/11/17 1750    Order Status:  Completed Specimen:   Blood from Peripheral, Hand, Right Updated:  12/12/17 0145     Blood Culture, Routine No Growth to date    Blood culture #2 **CANNOT BE ORDERED STAT** [[id number]] Collected:  12/11/17 1756    Order Status:  Completed Specimen:  Blood from Peripheral, Hand, Left Updated:  12/12/17 0145     Blood Culture, Routine No Growth to date    Urine culture [[id number]] Collected:  12/11/17 2022    Order Status:  Sent Specimen:  Urine from Urine, Clean Catch Updated:  12/11/17 2051    Respiratory Viral Panel by PCR [facility]; Nasal Swab [[id number]] Collected:  12/11/17 2022    Order Status:  Sent Specimen:  Respiratory Updated:  12/11/17 2050        Procalcitonin: No results for input(s): PROCAL in the last 48 hours.    Significant Imaging: I have reviewed all pertinent imaging results/findings within the past 24 hours.

## 2017-12-12 NOTE — ASSESSMENT & PLAN NOTE
- mild CACHORRO on CKD. Baseline creat is 2.2 (down to 2.4 today)  - possibly related to current SIRS   - renal dose abx and DVT prophylaxis  - monitor closely during stay

## 2017-12-12 NOTE — HPI
Lv Crocker is a  40 yo  male s/p OHTX 11/14, complicated by AMR requiring intensive immunosuppression subsequent to which he developed pancytopenia and resolution with continued recurrent ascites requiring scheduled monthly paracentesis presenting with 10 days complain of cough and pleurisy. Patient has been visiting urgent care with similar symptoms for the last three days and romero including CXR, blood cultures as well as urine culture has been negative. He was prescribed inhaler for possible URI and discharged two days ago. Today, he came to the ER with Fever of 103 and generalized myalgia. he report dry cough, constant throughout the day and sometimes causing him to vomit after an episode. He denies sick contact, hemoptysis or recent travel. He did get both influenza and pneumonia immunization. His last paracentesis was three weeks ago without any complication to the site or internally. He denies weight gain, leg edema, PND, orthopnea or AGUILAR. He also denies dysuria, hematuria, sinus tenderness or drainage. He had an XR yesterday that showed persistent R-sided ascites without without clear infiltrate. He reports compliant with current OHT regimen and that his prograf was changed to 3:3 dose three weeks ago.

## 2017-12-12 NOTE — SUBJECTIVE & OBJECTIVE
"Past Medical History:   Diagnosis Date    Arthritis     CAD (coronary artery disease)     H/o Coronary artery disease; resolved since heart transplant 2014    Cardiomyopathy     CHF (congestive heart failure)     Previously EF 20% (prior to heart transplant); last EF 55-60%; throught to be 2/2 genetics & DM1    Depression     Controlled "for the most part" on Lexipro    Encounter for blood transfusion     during transplant    GERD (gastroesophageal reflux disease)     Hashimoto's disease     HLD (hyperlipidemia) 6/11/2015    Hypoglycemia unawareness in type 1 diabetes mellitus     Hypothyroid     Initially hyperthyroid 2/2 Hoshimoto's thyroiditis; now on levothyroxine 150 mcg qd    Myocardial infarction     h/o MI x3 prior to heart transplant    Syncope 6/30/2015    Type I (juvenile type) diabetes mellitus with unspecified complication, uncontrolled     Controlled; Dx'd @7y/o; on N insulin 20U BID & Humalog 10U w/meals +SSI; Glu 89 11/9/17; A1c 7.2% 4/5/17    Unspecified essential hypertension 05/29/2014    Well controlled; Last /57 on 11/9/17       Past Surgical History:   Procedure Laterality Date    CARDIAC CATHETERIZATION      CARDIAC SURGERY      CHOLECYSTECTOMY      CORONARY ANGIOPLASTY      FOOT SPLIT TRANSFER OF THE POSTERIOR TIBIALIS TENDON PROCEDURE      HEART TRANSPLANT  2014    KNEE SURGERY      s/p oht  November 2014    stent placemnet         Review of patient's allergies indicates:   Allergen Reactions    No known drug allergies        Current Facility-Administered Medications   Medication    albuterol inhaler 2 puff    [START ON 12/12/2017] aspirin EC tablet 81 mg    [START ON 12/12/2017] azithromycin 500 mg in dextrose 5 % 250 mL IVPB (ready to mix system)    cefepime in dextrose 5 % 1 gram/50 mL IVPB 1 g    dextrose 50% injection 12.5 g    dextrose 50% injection 25 g    [START ON 12/12/2017] escitalopram oxalate tablet 20 mg    [START ON 12/12/2017] " fluticasone 50 mcg/actuation nasal spray 2 spray    gabapentin capsule 900 mg    glucagon (human recombinant) injection 1 mg    glucose chewable tablet 16 g    glucose chewable tablet 24 g    heparin (porcine) injection 5,000 Units    insulin aspart pen 0-5 Units    [START ON 12/12/2017] insulin detemir pen 20 Units    [START ON 12/12/2017] levothyroxine tablet 150 mcg    [START ON 12/12/2017] magnesium oxide tablet 400 mg    mycophenolate EC tablet 720 mg    nortriptyline capsule 25 mg    ondansetron disintegrating tablet 8 mg    [START ON 12/12/2017] pantoprazole EC tablet 40 mg    pravastatin tablet 40 mg    [START ON 12/12/2017] predniSONE tablet 2.5 mg    sodium chloride 0.9% flush 3 mL    tacrolimus capsule 3 mg    [START ON 12/12/2017] tamsulosin 24 hr capsule 0.4 mg    [START ON 12/12/2017] torsemide tablet 40 mg    vancomycin 1.25 g in 5 % dextrose 250 mL IVPB     Current Outpatient Prescriptions   Medication Sig    aspirin (ECOTRIN) 81 MG EC tablet Take 1 tablet (81 mg total) by mouth once daily.    escitalopram oxalate (LEXAPRO) 20 MG tablet Take 1 tablet (20 mg total) by mouth once daily.    fluticasone (FLONASE) 50 mcg/actuation nasal spray 2 sprays by Each Nare route once daily.    gabapentin (NEURONTIN) 300 MG capsule Take 3 capsules (900 mg total) by mouth 3 (three) times daily.    guaifenesin-codeine 100-10 mg/5 ml (TUSSI-ORGANIDIN NR)  mg/5 mL syrup Take 5 mLs by mouth 3 (three) times daily as needed.    insulin NPH (NOVOLIN N) 100 unit/mL injection Inject 20 Units into the skin 2 (two) times daily before meals.    levothyroxine (SYNTHROID) 150 MCG tablet Take 1 tablet (150 mcg total) by mouth every morning.    magnesium oxide (MAG-OX) 400 mg tablet Take 1 tablet (400 mg total) by mouth once daily.    mycophenolate (MYFORTIC) 180 MG TbEC Take 4 tablets (720 mg total) by mouth 2 (two) times daily. S/P Heart Transplant 11/18/2014 ICD-10 Z94.1    nortriptyline  "(PAMELOR) 25 MG capsule Take 1 capsule (25 mg total) by mouth every evening.    pantoprazole (PROTONIX) 40 MG tablet TAKE ONE TABLET BY MOUTH EVERY DAY    pen needle, diabetic 32 gauge x 5/32" Ndle Uses 5 pen needles a day    pravastatin (PRAVACHOL) 40 MG tablet Take 1 tablet (40 mg total) by mouth every evening.    predniSONE (DELTASONE) 2.5 MG tablet Take 1 tablet (2.5 mg total) by mouth once daily. S/P Heart Transplant 11/18/2014 ICD-10 Z94.1    tacrolimus (PROGRAF) 1 MG Cap Taked 3mg orally in the morning and 3mg orally in the evening. S/p heart transplant  11/18/2014  ICD-10 Z94.1    tamsulosin (FLOMAX) 0.4 mg Cp24 TAKE 2 CAPSULES(0.8 MG) BY MOUTH EVERY EVENING    torsemide (DEMADEX) 20 MG Tab Take 2 tablets (40 mg total) by mouth once daily.    albuterol 90 mcg/actuation inhaler Inhale 2 puffs into the lungs every 6 (six) hours as needed for Shortness of Breath.    glucagon (human recombinant) inj 1mg/mL kit Inject 1 mL (1 mg total) into the muscle as needed.    lancets Misc 1 Device by Misc.(Non-Drug; Combo Route) route 4 (four) times daily with meals and nightly.    ondansetron (ZOFRAN-ODT) 4 MG TbDL Take 2 tablets (8 mg total) by mouth every 8 (eight) hours as needed (nausea).     Family History     Problem Relation (Age of Onset)    Cancer Brother (50)    Diabetes Mother, Father, Brother, Son, Sister, Maternal Uncle, Paternal Aunt, Maternal Grandmother, Maternal Grandfather    Heart disease Mother, Brother    Hypertension Mother, Father, Brother    Kidney disease Mother, Father    Stroke Father, Brother    Vision loss Brother        Social History Main Topics    Smoking status: Never Smoker    Smokeless tobacco: Never Used    Alcohol use No    Drug use: No    Sexual activity: Yes     Partners: Female     Review of Systems   Constitutional: Positive for activity change (less active), fatigue and fever. Negative for chills and unexpected weight change.   HENT: Negative for congestion.  "   Eyes: Negative for discharge.   Respiratory: Positive for cough.    Cardiovascular: Negative for chest pain, palpitations and leg swelling.   Gastrointestinal: Negative for abdominal distention and blood in stool.   Genitourinary: Negative for difficulty urinating.   Musculoskeletal: Positive for arthralgias. Negative for back pain and joint swelling.   Skin: Negative for rash.   Neurological: Negative for dizziness.     Objective:     Vital Signs (Most Recent):  Temp: 98.5 °F (36.9 °C) (12/11/17 1858)  Pulse: (!) 128 (12/11/17 1858)  Resp: 18 (12/11/17 1858)  BP: (!) 90/50 (12/11/17 1858)  SpO2: 98 % (12/11/17 1344) Vital Signs (24h Range):  Temp:  [97.6 °F (36.4 °C)-98.5 °F (36.9 °C)] 98.5 °F (36.9 °C)  Pulse:  [107-128] 128  Resp:  [18] 18  SpO2:  [98 %] 98 %  BP: ()/(50-55) 90/50     Patient Vitals for the past 72 hrs (Last 3 readings):   Weight   12/11/17 1344 86.2 kg (190 lb)     Body mass index is 29.76 kg/m².      Intake/Output Summary (Last 24 hours) at 12/11/17 2151  Last data filed at 12/11/17 2003   Gross per 24 hour   Intake              250 ml   Output                0 ml   Net              250 ml       Physical Exam   Constitutional: He is oriented to person, place, and time. He appears well-developed.   HENT:   Head: Normocephalic.   Eyes: Conjunctivae are normal.   Neck: Normal range of motion. Neck supple. JVD (+12 JVD) present.   Cardiovascular: Normal rate, regular rhythm and normal heart sounds.  Exam reveals no friction rub.    Pulmonary/Chest: Effort normal. He has no wheezes. He has no rales.   Distant breath sounds to the R- base. Dull to percussion R- base   Abdominal: Soft. Bowel sounds are normal. He exhibits no distension and no mass. There is no guarding.   Musculoskeletal: He exhibits no edema.   Neurological: He is alert and oriented to person, place, and time.   Skin: Skin is warm and dry. He is not diaphoretic.   Nursing note and vitals reviewed.      Significant  Labs:  CBC:    Recent Labs  Lab 12/11/17 1750   WBC 6.55   RBC 3.04*   HGB 9.5*   HCT 28.5*      MCV 94   MCH 31.3*   MCHC 33.3     BNP:    Recent Labs  Lab 12/11/17 1750   *     CMP:    Recent Labs  Lab 12/11/17 1750   GLU 82   CALCIUM 9.3   ALBUMIN 3.2*   PROT 6.7      K 4.3   CO2 27      BUN 36*   CREATININE 2.6*   ALKPHOS 244*   ALT 18   AST 30   BILITOT 1.1*      Coagulation:   No results for input(s): PT, INR, APTT in the last 168 hours.  LDH:  No results for input(s): LDH in the last 72 hours.  Microbiology:  Microbiology Results (last 7 days)     Procedure Component Value Units Date/Time    Culture, Respiratory with Gram Stain [[id number]]     Order Status:  No result Specimen:  Respiratory     Urine culture [[id number]] Collected:  12/11/17 2022    Order Status:  Sent Specimen:  Urine from Urine, Clean Catch Updated:  12/11/17 2051    Respiratory Viral Panel by PCR [facility]; Nasal Swab [[id number]] Collected:  12/11/17 2022    Order Status:  Sent Specimen:  Respiratory Updated:  12/11/17 2050    Blood culture #2 **CANNOT BE ORDERED STAT** [[id number]] Collected:  12/11/17 1756    Order Status:  Sent Specimen:  Blood from Peripheral, Hand, Left Updated:  12/11/17 1822    Blood culture #1 **CANNOT BE ORDERED STAT** [[id number]] Collected:  12/11/17 1750    Order Status:  Sent Specimen:  Blood from Peripheral, Hand, Right Updated:  12/11/17 1802          I have reviewed all pertinent labs within the past 24 hours.    Diagnostic Results:

## 2017-12-12 NOTE — SUBJECTIVE & OBJECTIVE
Interval History: C/O 2-3 weeks of non productive cough associated with pleurisy and post tussive vomiting (none since admit). Also c/o fever 100.3 yesterday but none since admit. Denies NVD. States he has been taking all of his meds appropriately. Only treated his cough with cough medicine and guaifenesin since it started, no abx prior to abx administered on admit.  Of note, has >100,000 lactose fermenting bacteria in urine culture. Denies dysuria, hematuria, hesitancy, urgency.    Continuous Infusions:   Scheduled Meds:   aspirin  81 mg Oral Daily    azithromycin  500 mg Intravenous Q24H    ceFEPime (MAXIPIME) IVPB  1 g Intravenous Q12H    escitalopram oxalate  20 mg Oral Daily    fluticasone  2 spray Each Nare Daily    gabapentin  900 mg Oral TID    heparin (porcine)  5,000 Units Subcutaneous Q8H    insulin detemir  20 Units Subcutaneous QHS    levothyroxine  150 mcg Oral Before breakfast    magnesium oxide  400 mg Oral Daily    mycophenolate  720 mg Oral BID    nortriptyline  25 mg Oral QHS    pantoprazole  40 mg Oral Daily    pravastatin  40 mg Oral QHS    predniSONE  2.5 mg Oral Daily    sodium chloride 0.9%  3 mL Intravenous Q8H    tacrolimus  3 mg Oral BID    tamsulosin  0.4 mg Oral Daily    torsemide  40 mg Oral Daily     PRN Meds:albuterol, dextrose 50%, dextrose 50%, glucagon (human recombinant), glucose, glucose, insulin aspart, ondansetron    Review of patient's allergies indicates:   Allergen Reactions    No known drug allergies      Objective:     Vital Signs (Most Recent):  Temp: 98.2 °F (36.8 °C) (12/12/17 0800)  Pulse: 98 (12/12/17 1100)  Resp: 16 (12/12/17 0800)  BP: 107/62 (12/12/17 0800)  SpO2: (!) 94 % (12/12/17 0800) Vital Signs (24h Range):  Temp:  [97.6 °F (36.4 °C)-98.5 °F (36.9 °C)] 98.2 °F (36.8 °C)  Pulse:  [] 98  Resp:  [16-18] 16  SpO2:  [94 %-98 %] 94 %  BP: ()/(50-62) 107/62     Patient Vitals for the past 72 hrs (Last 3 readings):   Weight   12/12/17  0500 87.3 kg (192 lb 7.4 oz)   12/11/17 1344 86.2 kg (190 lb)     Body mass index is 30.14 kg/m².      Intake/Output Summary (Last 24 hours) at 12/12/17 1122  Last data filed at 12/12/17 0000   Gross per 24 hour   Intake              370 ml   Output                0 ml   Net              370 ml     Hemodynamic Parameters:    Telemetry: Sinus tach    Physical Exam  Constitutional: NAD, conversant  HEENT: Sclera anicteric, PERRLA, EOMI  Neck: Unable to visualize JVD, no carotid bruits  CV: Tachy, no murmur, normal S1/S2  Pulm: some bronchial breath sounds in right base, no crackles or wheezes   GI: Abdomen soft, distended, NTTP, +BS   Extremities: trace edema, right>left  warm and well perfused  Skin: No ecchymosis, erythema, or ulcers  Psych: AOx3, appropriate affect    Significant Labs:  CBC:    Recent Labs  Lab 12/11/17  1750 12/11/17  2227 12/12/17  0401   WBC 6.55 6.14 5.88   RBC 3.04* 2.83* 2.90*   HGB 9.5* 8.8* 8.6*   HCT 28.5* 26.0* 26.6*    149* 181   MCV 94 92 92   MCH 31.3* 31.1* 29.7   MCHC 33.3 33.8 32.3     BNP:    Recent Labs  Lab 12/11/17  1750   *     CMP:    Recent Labs  Lab 12/11/17  1750 12/12/17  0401   GLU 82 163*   CALCIUM 9.3 8.7   ALBUMIN 3.2*  --    PROT 6.7  --     135*   K 4.3 4.4   CO2 27 28    97   BUN 36* 36*   CREATININE 2.6* 2.4*   ALKPHOS 244*  --    ALT 18  --    AST 30  --    BILITOT 1.1*  --       Coagulation:   No results for input(s): PT, INR, APTT in the last 168 hours.  LDH:  No results for input(s): LDH in the last 72 hours.  Microbiology:  Microbiology Results (last 7 days)     Procedure Component Value Units Date/Time    Culture, Respiratory with Gram Stain [[id number]] Collected:  12/12/17 1101    Order Status:  Sent Specimen:  Respiratory from Sputum, Expectorated Updated:  12/12/17 1101    Blood culture #1 **CANNOT BE ORDERED STAT** [[id number]] Collected:  12/11/17 8067    Order Status:  Completed Specimen:  Blood from Peripheral, Hand, Right  Updated:  12/12/17 0145     Blood Culture, Routine No Growth to date    Blood culture #2 **CANNOT BE ORDERED STAT** [[id number]] Collected:  12/11/17 1756    Order Status:  Completed Specimen:  Blood from Peripheral, Hand, Left Updated:  12/12/17 0145     Blood Culture, Routine No Growth to date    Urine culture [[id number]] Collected:  12/11/17 2022    Order Status:  Sent Specimen:  Urine from Urine, Clean Catch Updated:  12/11/17 2051    Respiratory Viral Panel by PCR [facility]; Nasal Swab [[id number]] Collected:  12/11/17 2022    Order Status:  Sent Specimen:  Respiratory Updated:  12/11/17 2050          I have reviewed all pertinent labs within the past 24 hours.    Estimated Creatinine Clearance: 41.4 mL/min (based on SCr of 2.4 mg/dL (H)).    Diagnostic Results:  I have reviewed all pertinent imaging results/findings within the past 24 hours.

## 2017-12-12 NOTE — ASSESSMENT & PLAN NOTE
- chronic and related to CHF and liver dysfunction.  - monthly scheduled paracentesis. Last one three weeks ago  - CXR + for recollection to the R- but not significant for repeat paracentesis  - continue demedex as current and fluid restriction  - Unlikely SBP .

## 2017-12-12 NOTE — H&P
"[facility]  Heart Transplant  H&P    Patient Name: Lv Crocker  MRN: [id number]  Admission Date: 12/11/2017  Attending Physician: No att. providers found  Primary Care Provider: Philippe Mohr MD  Principal Problem:<principal problem not specified>    Subjective:     History of Present Illness:    Lv Crocker is a  40 yo  male s/p OHTX 11/14, complicated by AMR requiring intensive immunosuppression subsequent to which he developed pancytopenia and resolution with continued recurrent ascites requiring scheduled monthly paracentesis presenting with 10 days complain of cough and pleurisy. Patient has been visiting urgent care with similar symptoms for the last three days and romero including CXR, blood cultures as well as urine culture has been negative. He was prescribed inhaler for possible URI and discharged two days ago. Today, he came to the ER with Fever of 103 and generalized myalgia. he report dry cough, constant throughout the day and sometimes causing him to vomit after an episode. He denies sick contact, hemoptysis or recent travel. He did get both influenza and pneumonia immunization. His last paracentesis was three weeks ago without any complication to the site or internally. He denies weight gain, leg edema, PND, orthopnea or AGUILAR. He also denies dysuria, hematuria, sinus tenderness or drainage. He had an XR yesterday that showed persistent R-sided ascites without without clear infiltrate. He reports compliant with current OHT regimen and that his prograf was changed to 3:3 dose three weeks ago.    Past Medical History:   Diagnosis Date    Arthritis     CAD (coronary artery disease)     H/o Coronary artery disease; resolved since heart transplant 2014    Cardiomyopathy     CHF (congestive heart failure)     Previously EF 20% (prior to heart transplant); last EF 55-60%; throught to be 2/2 genetics & DM1    Depression     Controlled "for the most part" on Lexipro    " Encounter for blood transfusion     during transplant    GERD (gastroesophageal reflux disease)     Hashimoto's disease     HLD (hyperlipidemia) 6/11/2015    Hypoglycemia unawareness in type 1 diabetes mellitus     Hypothyroid     Initially hyperthyroid 2/2 Hoshimoto's thyroiditis; now on levothyroxine 150 mcg qd    Myocardial infarction     h/o MI x3 prior to heart transplant    Syncope 6/30/2015    Type I (juvenile type) diabetes mellitus with unspecified complication, uncontrolled     Controlled; Dx'd @7y/o; on N insulin 20U BID & Humalog 10U w/meals +SSI; Glu 89 11/9/17; A1c 7.2% 4/5/17    Unspecified essential hypertension 05/29/2014    Well controlled; Last /57 on 11/9/17       Past Surgical History:   Procedure Laterality Date    CARDIAC CATHETERIZATION      CARDIAC SURGERY      CHOLECYSTECTOMY      CORONARY ANGIOPLASTY      FOOT SPLIT TRANSFER OF THE POSTERIOR TIBIALIS TENDON PROCEDURE      HEART TRANSPLANT  2014    KNEE SURGERY      s/p oht  November 2014    stent placemnet         Review of patient's allergies indicates:   Allergen Reactions    No known drug allergies        Current Facility-Administered Medications   Medication    albuterol inhaler 2 puff    [START ON 12/12/2017] aspirin EC tablet 81 mg    [START ON 12/12/2017] azithromycin 500 mg in dextrose 5 % 250 mL IVPB (ready to mix system)    cefepime in dextrose 5 % 1 gram/50 mL IVPB 1 g    dextrose 50% injection 12.5 g    dextrose 50% injection 25 g    [START ON 12/12/2017] escitalopram oxalate tablet 20 mg    [START ON 12/12/2017] fluticasone 50 mcg/actuation nasal spray 2 spray    gabapentin capsule 900 mg    glucagon (human recombinant) injection 1 mg    glucose chewable tablet 16 g    glucose chewable tablet 24 g    heparin (porcine) injection 5,000 Units    insulin aspart pen 0-5 Units    [START ON 12/12/2017] insulin detemir pen 20 Units    [START ON 12/12/2017] levothyroxine tablet 150 mcg     "[START ON 12/12/2017] magnesium oxide tablet 400 mg    mycophenolate EC tablet 720 mg    nortriptyline capsule 25 mg    ondansetron disintegrating tablet 8 mg    [START ON 12/12/2017] pantoprazole EC tablet 40 mg    pravastatin tablet 40 mg    [START ON 12/12/2017] predniSONE tablet 2.5 mg    sodium chloride 0.9% flush 3 mL    tacrolimus capsule 3 mg    [START ON 12/12/2017] tamsulosin 24 hr capsule 0.4 mg    [START ON 12/12/2017] torsemide tablet 40 mg    vancomycin 1.25 g in 5 % dextrose 250 mL IVPB     Current Outpatient Prescriptions   Medication Sig    aspirin (ECOTRIN) 81 MG EC tablet Take 1 tablet (81 mg total) by mouth once daily.    escitalopram oxalate (LEXAPRO) 20 MG tablet Take 1 tablet (20 mg total) by mouth once daily.    fluticasone (FLONASE) 50 mcg/actuation nasal spray 2 sprays by Each Nare route once daily.    gabapentin (NEURONTIN) 300 MG capsule Take 3 capsules (900 mg total) by mouth 3 (three) times daily.    guaifenesin-codeine 100-10 mg/5 ml (TUSSI-ORGANIDIN NR)  mg/5 mL syrup Take 5 mLs by mouth 3 (three) times daily as needed.    insulin NPH (NOVOLIN N) 100 unit/mL injection Inject 20 Units into the skin 2 (two) times daily before meals.    levothyroxine (SYNTHROID) 150 MCG tablet Take 1 tablet (150 mcg total) by mouth every morning.    magnesium oxide (MAG-OX) 400 mg tablet Take 1 tablet (400 mg total) by mouth once daily.    mycophenolate (MYFORTIC) 180 MG TbEC Take 4 tablets (720 mg total) by mouth 2 (two) times daily. S/P Heart Transplant 11/18/2014 ICD-10 Z94.1    nortriptyline (PAMELOR) 25 MG capsule Take 1 capsule (25 mg total) by mouth every evening.    pantoprazole (PROTONIX) 40 MG tablet TAKE ONE TABLET BY MOUTH EVERY DAY    pen needle, diabetic 32 gauge x 5/32" Ndle Uses 5 pen needles a day    pravastatin (PRAVACHOL) 40 MG tablet Take 1 tablet (40 mg total) by mouth every evening.    predniSONE (DELTASONE) 2.5 MG tablet Take 1 tablet (2.5 mg total) " by mouth once daily. S/P Heart Transplant 11/18/2014 ICD-10 Z94.1    tacrolimus (PROGRAF) 1 MG Cap Taked 3mg orally in the morning and 3mg orally in the evening. S/p heart transplant  11/18/2014  ICD-10 Z94.1    tamsulosin (FLOMAX) 0.4 mg Cp24 TAKE 2 CAPSULES(0.8 MG) BY MOUTH EVERY EVENING    torsemide (DEMADEX) 20 MG Tab Take 2 tablets (40 mg total) by mouth once daily.    albuterol 90 mcg/actuation inhaler Inhale 2 puffs into the lungs every 6 (six) hours as needed for Shortness of Breath.    glucagon (human recombinant) inj 1mg/mL kit Inject 1 mL (1 mg total) into the muscle as needed.    lancets Misc 1 Device by Misc.(Non-Drug; Combo Route) route 4 (four) times daily with meals and nightly.    ondansetron (ZOFRAN-ODT) 4 MG TbDL Take 2 tablets (8 mg total) by mouth every 8 (eight) hours as needed (nausea).     Family History     Problem Relation (Age of Onset)    Cancer Brother (50)    Diabetes Mother, Father, Brother, Son, Sister, Maternal Uncle, Paternal Aunt, Maternal Grandmother, Maternal Grandfather    Heart disease Mother, Brother    Hypertension Mother, Father, Brother    Kidney disease Mother, Father    Stroke Father, Brother    Vision loss Brother        Social History Main Topics    Smoking status: Never Smoker    Smokeless tobacco: Never Used    Alcohol use No    Drug use: No    Sexual activity: Yes     Partners: Female     Review of Systems   Constitutional: Positive for activity change (less active), fatigue and fever. Negative for chills and unexpected weight change.   HENT: Negative for congestion.    Eyes: Negative for discharge.   Respiratory: Positive for cough.    Cardiovascular: Negative for chest pain, palpitations and leg swelling.   Gastrointestinal: Negative for abdominal distention and blood in stool.   Genitourinary: Negative for difficulty urinating.   Musculoskeletal: Positive for arthralgias. Negative for back pain and joint swelling.   Skin: Negative for rash.    Neurological: Negative for dizziness.     Objective:     Vital Signs (Most Recent):  Temp: 98.5 °F (36.9 °C) (12/11/17 1858)  Pulse: (!) 128 (12/11/17 1858)  Resp: 18 (12/11/17 1858)  BP: (!) 90/50 (12/11/17 1858)  SpO2: 98 % (12/11/17 1344) Vital Signs (24h Range):  Temp:  [97.6 °F (36.4 °C)-98.5 °F (36.9 °C)] 98.5 °F (36.9 °C)  Pulse:  [107-128] 128  Resp:  [18] 18  SpO2:  [98 %] 98 %  BP: ()/(50-55) 90/50     Patient Vitals for the past 72 hrs (Last 3 readings):   Weight   12/11/17 1344 86.2 kg (190 lb)     Body mass index is 29.76 kg/m².      Intake/Output Summary (Last 24 hours) at 12/11/17 2151  Last data filed at 12/11/17 2003   Gross per 24 hour   Intake              250 ml   Output                0 ml   Net              250 ml       Physical Exam   Constitutional: He is oriented to person, place, and time. He appears well-developed.   HENT:   Head: Normocephalic.   Eyes: Conjunctivae are normal.   Neck: Normal range of motion. Neck supple. JVD (+12 JVD) present.   Cardiovascular: Normal rate, regular rhythm and normal heart sounds.  Exam reveals no friction rub.    Pulmonary/Chest: Effort normal. He has no wheezes. He has no rales.   Distant breath sounds to the R- base. Dull to percussion R- base   Abdominal: Soft. Bowel sounds are normal. He exhibits no distension and no mass. There is no guarding.   Musculoskeletal: He exhibits no edema.   Neurological: He is alert and oriented to person, place, and time.   Skin: Skin is warm and dry. He is not diaphoretic.   Nursing note and vitals reviewed.      Significant Labs:  CBC:    Recent Labs  Lab 12/11/17  1750   WBC 6.55   RBC 3.04*   HGB 9.5*   HCT 28.5*      MCV 94   MCH 31.3*   MCHC 33.3     BNP:    Recent Labs  Lab 12/11/17  1750   *     CMP:    Recent Labs  Lab 12/11/17  1750   GLU 82   CALCIUM 9.3   ALBUMIN 3.2*   PROT 6.7      K 4.3   CO2 27      BUN 36*   CREATININE 2.6*   ALKPHOS 244*   ALT 18   AST 30   BILITOT 1.1*       Coagulation:   No results for input(s): PT, INR, APTT in the last 168 hours.  LDH:  No results for input(s): LDH in the last 72 hours.  Microbiology:  Microbiology Results (last 7 days)     Procedure Component Value Units Date/Time    Culture, Respiratory with Gram Stain [[id number]]     Order Status:  No result Specimen:  Respiratory     Urine culture [[id number]] Collected:  12/11/17 2022    Order Status:  Sent Specimen:  Urine from Urine, Clean Catch Updated:  12/11/17 2051    Respiratory Viral Panel by PCR [facility]; Nasal Swab [[id number]] Collected:  12/11/17 2022    Order Status:  Sent Specimen:  Respiratory Updated:  12/11/17 2050    Blood culture #2 **CANNOT BE ORDERED STAT** [[id number]] Collected:  12/11/17 1756    Order Status:  Sent Specimen:  Blood from Peripheral, Hand, Left Updated:  12/11/17 1822    Blood culture #1 **CANNOT BE ORDERED STAT** [[id number]] Collected:  12/11/17 1750    Order Status:  Sent Specimen:  Blood from Peripheral, Hand, Right Updated:  12/11/17 1802          I have reviewed all pertinent labs within the past 24 hours.    Diagnostic Results:      Assessment/Plan:     Cough in adult    - Unclear source. Could be atypical pneumonia Versus fluid overload secondary to CAV.   - Most likely pneumonia with fever and frequent hospital visit  - Prior bx/IVUS results negative for CAV  - start broad spec abx including vanco x1 , cefepime and azithromycin  - renal dose abx  - Pan culture including respiratory panel  - Echo done in ED- Poor window. Repeat during stay if not improving.   - continue Demadex and fluid restriction as routine. BNP less suggestive of fluid overload although obese          Type 1 diabetes mellitus with hyperglycemia    - on Humalog BID at home  - started on detemir daily with ISS  - monitor closely and adjust as needed        Heart transplanted    - recent change of prograft to 3MG bid from 2mg and 3mg  - check levels x1. Adjust as needed          Ascites    -  chronic and related to CHF and liver dysfunction.  - monthly scheduled paracentesis. Last one three weeks ago  - CXR + for recollection to the R- but not significant for repeat paracentesis  - continue demedex as current and fluid restriction  - Unlikely SBP .        Chronic kidney disease (CKD), stage III (moderate)    - mild CACHORRO on CKD. Baseline creat is 2.2  - possibly related to current SIRS   - renal dose abx and DVT prophylaxis  - prograft level  - monitor closely during stay            Discussed plan of care with  the attending on call    Josiah Walsh MD  Heart Transplant  [facility]

## 2017-12-12 NOTE — ASSESSMENT & PLAN NOTE
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  - monthly scheduled paracentesis. Last one three weeks ago  - CXR + for recollection to the R- but not significant for repeat paracentesis. Will CT chest to evaluate for need to tap and culture collection  - continue demedex as current and fluid restriction

## 2017-12-12 NOTE — CONSULTS
"      Cardiology ER Consult Note  Attending Physician: Deepthi Johnson MD  Chief Complaint: Cough, congestion     HPI:   42 y.o. gentleman with PMH of s/p OHTX 11/14, complicated by AMR, treated 04/15 with 5 days PP, IVIG x 2 doses, reccurent abdominal ascites who gets therapeutic paracentesis ever 3-4 weeks(last time the week of thanksgiving) who presents to the ED for a 1.5 week history of cough, congestion, sore throat, fatigue, body aches and intermittent light headedness. He denies weight gain, orthopnea, palpitations. He states he has chest pain with cough. He also notes a fever of 100.3 this AM. He denies sick contacts. He visited his PCP and urgent care with these symptoms prior to them becoming more severe however as his symptoms were getting worse he came in to [facility].     Urine cultures from yesterday are positive for a lactose fermenting GNR however speciation is pending. Patient denies any urinary symptoms. He notes his urine is always dark. Denies diarrhea or blood in stool.     ROS:    Constitution: see hpi  HENT: +sore throat,  Denies rhinorrhea, or headache.  Eyes: Negative for blurred or double vision.   Cardiovascular: See above  Pulmonary: Positive for SOB   Gastrointestinal: Negative for abdominal pain, nausea, vomiting, or diarrhea.   : Negative for dysuria.   Neurological: Negative for focal weakness or sensory changes.  PMH:     Past Medical History:   Diagnosis Date    Arthritis     CAD (coronary artery disease)     H/o Coronary artery disease; resolved since heart transplant 2014    Cardiomyopathy     CHF (congestive heart failure)     Previously EF 20% (prior to heart transplant); last EF 55-60%; throught to be 2/2 genetics & DM1    Depression     Controlled "for the most part" on Lexipro    Encounter for blood transfusion     during transplant    GERD (gastroesophageal reflux disease)     Hashimoto's disease     HLD (hyperlipidemia) 6/11/2015    Hypoglycemia unawareness in " type 1 diabetes mellitus     Hypothyroid     Initially hyperthyroid 2/2 Hoshimoto's thyroiditis; now on levothyroxine 150 mcg qd    Myocardial infarction     h/o MI x3 prior to heart transplant    Syncope 6/30/2015    Type I (juvenile type) diabetes mellitus with unspecified complication, uncontrolled     Controlled; Dx'd @5y/o; on N insulin 20U BID & Humalog 10U w/meals +SSI; Glu 89 11/9/17; A1c 7.2% 4/5/17    Unspecified essential hypertension 05/29/2014    Well controlled; Last /57 on 11/9/17     Past Surgical History:   Procedure Laterality Date    CARDIAC CATHETERIZATION      CARDIAC SURGERY      CHOLECYSTECTOMY      CORONARY ANGIOPLASTY      FOOT SPLIT TRANSFER OF THE POSTERIOR TIBIALIS TENDON PROCEDURE      HEART TRANSPLANT  2014    KNEE SURGERY      s/p oht  November 2014    stent placemnet       Allergies:     Review of patient's allergies indicates:   Allergen Reactions    No known drug allergies      Medications:     No current facility-administered medications on file prior to encounter.      Current Outpatient Prescriptions on File Prior to Encounter   Medication Sig Dispense Refill    aspirin (ECOTRIN) 81 MG EC tablet Take 1 tablet (81 mg total) by mouth once daily. 30 tablet 11    escitalopram oxalate (LEXAPRO) 20 MG tablet Take 1 tablet (20 mg total) by mouth once daily. 30 tablet 11    fluticasone (FLONASE) 50 mcg/actuation nasal spray 2 sprays by Each Nare route once daily. 1 Bottle 12    gabapentin (NEURONTIN) 300 MG capsule Take 3 capsules (900 mg total) by mouth 3 (three) times daily. 270 capsule 11    guaifenesin-codeine 100-10 mg/5 ml (TUSSI-ORGANIDIN NR)  mg/5 mL syrup Take 5 mLs by mouth 3 (three) times daily as needed. 118 mL 0    insulin NPH (NOVOLIN N) 100 unit/mL injection Inject 20 Units into the skin 2 (two) times daily before meals. 1 vial 0    levothyroxine (SYNTHROID) 150 MCG tablet Take 1 tablet (150 mcg total) by mouth every morning. 30 tablet 12  "   magnesium oxide (MAG-OX) 400 mg tablet Take 1 tablet (400 mg total) by mouth once daily. 30 tablet 11    mycophenolate (MYFORTIC) 180 MG TbEC Take 4 tablets (720 mg total) by mouth 2 (two) times daily. S/P Heart Transplant 11/18/2014 ICD-10 Z94.1 240 tablet 11    nortriptyline (PAMELOR) 25 MG capsule Take 1 capsule (25 mg total) by mouth every evening. 30 capsule 3    pantoprazole (PROTONIX) 40 MG tablet TAKE ONE TABLET BY MOUTH EVERY DAY 30 tablet 11    pen needle, diabetic 32 gauge x 5/32" Ndle Uses 5 pen needles a day 200 each 12    pravastatin (PRAVACHOL) 40 MG tablet Take 1 tablet (40 mg total) by mouth every evening. 30 tablet 11    predniSONE (DELTASONE) 2.5 MG tablet Take 1 tablet (2.5 mg total) by mouth once daily. S/P Heart Transplant 11/18/2014 ICD-10 Z94.1 30 tablet 11    tacrolimus (PROGRAF) 1 MG Cap Taked 3mg orally in the morning and 3mg orally in the evening. S/p heart transplant  11/18/2014  ICD-10 Z94.1 180 capsule 11    tamsulosin (FLOMAX) 0.4 mg Cp24 TAKE 2 CAPSULES(0.8 MG) BY MOUTH EVERY EVENING 60 capsule 1    torsemide (DEMADEX) 20 MG Tab Take 2 tablets (40 mg total) by mouth once daily. 60 tablet 11    albuterol 90 mcg/actuation inhaler Inhale 2 puffs into the lungs every 6 (six) hours as needed for Shortness of Breath. 18 g 0    glucagon (human recombinant) inj 1mg/mL kit Inject 1 mL (1 mg total) into the muscle as needed. 1 kit 6    lancets Misc 1 Device by Misc.(Non-Drug; Combo Route) route 4 (four) times daily with meals and nightly. 100 each 5    ondansetron (ZOFRAN-ODT) 4 MG TbDL Take 2 tablets (8 mg total) by mouth every 8 (eight) hours as needed (nausea). 15 tablet 0       Inpatient Medications   Continuous Infusions:  Scheduled Meds:  PRN Meds:     Social History:     Social History   Substance Use Topics    Smoking status: Never Smoker    Smokeless tobacco: Never Used    Alcohol use No     Family History:     Family History   Problem Relation Age of Onset    " Heart disease Mother     Kidney disease Mother     Diabetes Mother     Hypertension Mother     Kidney disease Father     Diabetes Father     Hypertension Father     Stroke Father     Heart disease Brother     Stroke Brother     Diabetes Brother     Cancer Brother 50     pancreatic    Vision loss Brother     Hypertension Brother     Diabetes Son     Diabetes Sister     Diabetes Maternal Uncle     Diabetes Paternal Aunt     Diabetes Maternal Grandmother     Diabetes Maternal Grandfather      Physical Exam:     Vitals:  Temp:  [97.6 °F (36.4 °C)]   Pulse:  [107]   Resp:  [18]   BP: (107)/(55)   SpO2:  [98 %]   I/O's:  No intake or output data in the 24 hours ending 12/11/17 1843     Constitutional: NAD, conversant  HEENT: Sclera anicteric, PERRLA, EOMI  Neck: Unable to visualize JVD, no carotid bruits  CV: Tachy, no murmur, normal S1/S2  Pulm: some bronchial breath sounds in right base, no crackles  GI: Abdomen soft, NTND, +BS   Extremities: trace edema, right>left  warm and well perfused  Skin: No ecchymosis, erythema, or ulcers  Psych: AOx3, appropriate affect      Labs:       Recent Labs  Lab 12/11/17  1750      K 4.3      CO2 27   BUN 36*   CREATININE 2.6*   ANIONGAP 11       Recent Labs  Lab 12/11/17  1750   AST 30   ALT 18   ALKPHOS 244*   BILITOT 1.1*   ALBUMIN 3.2*     No results for input(s): TROPONINI, BNP in the last 168 hours.   Recent Labs  Lab 12/11/17  1750   WBC 6.55   HGB 9.5*   HCT 28.5*      GRAN 94.0*     No results for input(s): PTT, INR in the last 168 hours.  Lab Results   Component Value Date    CHOL 120 11/30/2017    HDL 32 (L) 11/30/2017    LDLCALC 74.8 11/30/2017    TRIG 66 11/30/2017     Lab Results   Component Value Date    HGBA1C 6.9 (H) 11/30/2017        Micro:  Blood Cultures  Lab Results   Component Value Date    LABBLOO No growth after 5 days. 12/29/2016    LABBLOO No growth after 5 days. 12/29/2016    LABBLOO No growth after 5 days. 05/07/2015     LABBLOO No growth after 5 days. 04/20/2015    LABBLOO No growth after 5 days. 04/20/2015     Urine Cultures  Lab Results   Component Value Date    LABURIN  12/10/2017     GRAM NEGATIVE DORYS, LACTOSE   >100,000 cfu/ml  Identification and susceptibility pending      LABURIN KLEBSIELLA PNEUMONIAE  >100,000 cfu/ml   10/01/2015    LABURIN KLEBSIELLA PNEUMONIAE  >100,000 cfu/ml   05/05/2015    LABURIN No significant growth 05/03/2015    LABURIN KLEBSIELLA PNEUMONIAE ESBL  >100,000 cfu/ml   04/21/2015       Imaging:   TTE 11/30/17:    1 - Post-cardiac transplantation study.     2 - Normal left ventricular systolic function (EF 55-60%).     3 - No wall motion abnormalities.     4 - Normal left ventricular diastolic function.     5 - Low normal to mildly depressed right ventricular systolic function .     6 - The estimated PA systolic pressure is 35 mmHg.     7 - Trivial mitral regurgitation.     8 - Mild tricuspid regurgitation.     9 - Intermediate central venous pressure.     EF   Date Value Ref Range Status   11/30/2017 55 55 - 65    04/05/2017 55 55 - 65    01/16/2017 58 55 - 65    12/09/2016 60 55 - 65    09/12/2016 60 55 - 65        EKG: Sinus tach, lateral TWI, unchanged from prior    Assessment:   42 y.o. gentleman with PMH of s/p OHTX 11/14, complicated by AMR, treated 04/15 with 5 days PP, IVIG x 2 doses, reccurent abdominal ascites who gets therapeutic paracentesis ever 3-4 weeks(last time the week of thanksgiving) who presents to the ED for a 1.5 week history of cough, congestion, sore throat, fatigue, body aches and intermittent light headedness. No clinical or lab manifestations of volume overload.    Plan:   - Admit to HTS floor  - initiate infectious workup(pan culture, flu swab, respiratory virus workup)  - Lactose fermenting urine culture pending speciation, will cover for GNR also however symptomatology currently is more consistent with URI  - CXR pending  - will initiate broad coverage ABX  coverage(already received one dose of azithro in ER)  - TTE in AM to assess for change in EF(last echo 11/30/17 with normal EF)    Discussed with [facility] Staff Dr. Fernandez    [facility] Hospitalist notified of admission    Signed:  Kin Gaxiola DO  Cardiology Fellow  Pager - [phone]  12/11/2017 6:32 PM

## 2017-12-12 NOTE — CONSULTS
[facility]  Infectious Disease  Consult Note    Patient Name: Lv Crocker  MRN: [id number]  Admission Date: 12/11/2017  Hospital Length of Stay: 1 days  Attending Physician: Jose A Lloyd MD  Primary Care Provider: Philippe Mohr MD   Inpatient consult to Infectious Diseases  Consult performed by: PHAN WEBSTER  Consult ordered by: ERNESTO ALSTON  Reason for consult: 4 week cough without resolution, no recent hospitalizations, no abx until this admit        Consult received, full note to follow. Please call with questions    Phan Webster MD, PhD  Infectious Disease, PGY-4  [facility]

## 2017-12-12 NOTE — ASSESSMENT & PLAN NOTE
- S/P OHTx 11/8/14 (high risk)   - history of AMR treated with PP and IVIG 4/2016   - Biopsy 9/12/16 negative for rejection   - [facility] 2/10/16 no CAV  - Prograf recently increased to 3/3, continue pred 2.5 and Myfortic 720 BID  - Prograf goal 6-8  - Repeat echo pending (most recent with preserved EF in August 2017)

## 2017-12-13 PROBLEM — J18.9 PNEUMONIA: Status: ACTIVE | Noted: 2017-01-01

## 2017-12-13 PROBLEM — J90 PLEURAL EFFUSION: Status: ACTIVE | Noted: 2017-01-01

## 2017-12-13 NOTE — HPI
Mr. Crocker is a 43 yo WM with a PMH significant for heart transplant in 11/2014 (on Prograf, Myfortic, and Prednisone), Hashimoto's thyroiditis, DM 2, CKD who presented on 12/12 for worsening cough. Patient states that he started to have cold symptoms approximately 2 weeks ago with progressive worsening over the last 3-4 days. Patient complains most about his AGUILAR and cough that has gotten intolerable - patient started to have L sided back pain and emesis from the cough. He did go see his PCP who prescribed him an anti-tussive, as well as urgent care where he received an inhaler. Patient has had subjective fevers at home, but otherwise highest recorded temp was 100.3. Patient denies hemoptysis or sputum production - the flem he brings up is clear in color. Patient has not had any sick contacts or recent travel. Prior to getting sick, his only visit was to the hospital clinic for annual transplant check-up appointment. To note, patient also has a history of abdominal ascites thought to be 2/2 to passive congestion requiring paracentesis q3-4 weeks. Upon arriving to the hospital, patient was pan-cultured, flu swab was (-), and patient was started on broad spectrum abx, as well as Tamiflu. Repeat TTE was done which did not show any new abnormalities. CXR and CT chest were obtained - bilateral pneumonic process noted on both modalities. R pleural effusion has slightly increased, but unchanged since transplant.

## 2017-12-13 NOTE — ASSESSMENT & PLAN NOTE
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  - monthly scheduled paracentesis. Last one three weeks ago. May need repeat Friday due to worsening renal function and worsening R LL effusion  - CXR + for recollection to the R- but not significant for repeat paracentesis.  - continue demedex as current and fluid restriction.

## 2017-12-13 NOTE — SUBJECTIVE & OBJECTIVE
"Past Medical History:   Diagnosis Date    Arthritis     CAD (coronary artery disease)     H/o Coronary artery disease; resolved since heart transplant 2014    Cardiomyopathy     CHF (congestive heart failure)     Previously EF 20% (prior to heart transplant); last EF 55-60%; throught to be 2/2 genetics & DM1    Depression     Controlled "for the most part" on Lexipro    Encounter for blood transfusion     during transplant    GERD (gastroesophageal reflux disease)     Hashimoto's disease     HLD (hyperlipidemia) 6/11/2015    Hypoglycemia unawareness in type 1 diabetes mellitus     Hypothyroid     Initially hyperthyroid 2/2 Hoshimoto's thyroiditis; now on levothyroxine 150 mcg qd    Myocardial infarction     h/o MI x3 prior to heart transplant    Syncope 6/30/2015    Type I (juvenile type) diabetes mellitus with unspecified complication, uncontrolled     Controlled; Dx'd @7y/o; on N insulin 20U BID & Humalog 10U w/meals +SSI; Glu 89 11/9/17; A1c 7.2% 4/5/17    Unspecified essential hypertension 05/29/2014    Well controlled; Last /57 on 11/9/17     Past Surgical History:   Procedure Laterality Date    CARDIAC CATHETERIZATION      CARDIAC SURGERY      CHOLECYSTECTOMY      CORONARY ANGIOPLASTY      FOOT SPLIT TRANSFER OF THE POSTERIOR TIBIALIS TENDON PROCEDURE      HEART TRANSPLANT  2014    KNEE SURGERY      s/p oht  November 2014    stent placemnet       Review of patient's allergies indicates:   Allergen Reactions    No known drug allergies      Family History     Problem Relation (Age of Onset)    Cancer Brother (50)    Diabetes Mother, Father, Brother, Son, Sister, Maternal Uncle, Paternal Aunt, Maternal Grandmother, Maternal Grandfather    Heart disease Mother, Brother    Hypertension Mother, Father, Brother    Kidney disease Mother, Father    Stroke Father, Brother    Vision loss Brother        Social History Main Topics    Smoking status: Never Smoker    Smokeless tobacco: Never " Used    Alcohol use No    Drug use: No    Sexual activity: Yes     Partners: Female     Review of Systems   Constitutional: Positive for fatigue and fever. Negative for chills and diaphoresis.   HENT: Negative.    Eyes: Negative.    Respiratory: Positive for cough and shortness of breath. Negative for chest tightness and wheezing.    Cardiovascular: Negative.    Gastrointestinal: Negative.    Endocrine: Negative.    Genitourinary: Negative.    Musculoskeletal: Negative.    Skin: Negative.    Neurological: Negative.      Objective:     Vital Signs (Most Recent):  Temp: 99 °F (37.2 °C) (12/13/17 0800)  Pulse: 98 (12/13/17 1100)  Resp: 18 (12/13/17 0800)  BP: (!) 96/58 (12/13/17 0922)  SpO2: 95 % (12/13/17 0800) Vital Signs (24h Range):  Temp:  [98.4 °F (36.9 °C)-99.3 °F (37.4 °C)] 99 °F (37.2 °C)  Pulse:  [] 98  Resp:  [18] 18  SpO2:  [89 %-95 %] 95 %  BP: ()/(42-67) 96/58     Weight: 84.6 kg (186 lb 9.9 oz)  Body mass index is 29.23 kg/m².    Intake/Output Summary (Last 24 hours) at 12/13/17 1308  Last data filed at 12/13/17 0558   Gross per 24 hour   Intake              100 ml   Output             1000 ml   Net             -900 ml     Physical Exam   Constitutional: He is oriented to person, place, and time. He appears well-developed and well-nourished. No distress.   HENT:   Head: Normocephalic and atraumatic.   Mouth/Throat: Oropharynx is clear and moist. No oropharyngeal exudate.   Eyes: Conjunctivae and EOM are normal. Pupils are equal, round, and reactive to light. No scleral icterus.   Neck: Normal range of motion. Neck supple.   Cardiovascular: Normal rate, regular rhythm, normal heart sounds and intact distal pulses.    No murmur heard.  Pulmonary/Chest: Effort normal. No respiratory distress. He has no wheezes. He has no rales. He exhibits no tenderness.   Decreased breath sounds R base.   Abdominal: Soft. Bowel sounds are normal. He exhibits distension. There is no tenderness.    Musculoskeletal: Normal range of motion. He exhibits no edema.   Lymphadenopathy:     He has no cervical adenopathy.   Neurological: He is alert and oriented to person, place, and time.   Skin: Skin is warm and dry. He is not diaphoretic.   Psychiatric: He has a normal mood and affect. Judgment normal.   Nursing note and vitals reviewed.    Lines/Drains/Airways     Peripheral Intravenous Line                 Peripheral IV - Single Lumen 12/11/17 1750 Right Hand 1 day              Significant Labs:    CBC/Anemia Profile:    Recent Labs  Lab 12/11/17 2227 12/12/17 0401 12/13/17  0348   WBC 6.14 5.88 6.70   HGB 8.8* 8.6* 8.7*   HCT 26.0* 26.6* 25.7*   * 181 202   MCV 92 92 92   RDW 12.2 12.4 12.1     Chemistries:    Recent Labs  Lab 12/11/17  1750 12/11/17 2227 12/12/17 0401 12/13/17  0348     --  135* 134*   K 4.3  --  4.4 4.6     --  97 96   CO2 27  --  28 28   BUN 36*  --  36* 47*   CREATININE 2.6*  --  2.4* 2.9*   CALCIUM 9.3  --  8.7 8.9   ALBUMIN 3.2*  --   --  2.8*   PROT 6.7  --   --  6.1   BILITOT 1.1*  --   --  1.2*   ALKPHOS 244*  --   --  220*   ALT 18  --   --  16   AST 30  --   --  25   MG  --  1.9  --   --      All pertinent labs within the past 24 hours have been reviewed.    Significant Imaging:   I have reviewed and interpreted all pertinent imaging results/findings within the past 24 hours.

## 2017-12-13 NOTE — PROGRESS NOTES
"[facility] [address]  Heart Transplant  Progress Note    Patient Name: Lv Crocker  MRN: [id number]  Admission Date: 12/11/2017  Hospital Length of Stay: 2 days  Attending Physician: Jose A Lloyd MD  Primary Care Provider: Philippe Mohr MD  Principal Problem:Cough in adult    Subjective:     Interval History: No complaints overnight other than continued cough. Although he states codeine made him "cough worse" in the past he is willing to try something similar again. Otherwise no NVD, denies chest pain, SOB, fever. Thinks he feels a little better and more energetic today.     Continuous Infusions:   Scheduled Meds:   aspirin  81 mg Oral Daily    azithromycin  500 mg Intravenous Q24H    ceFEPime (MAXIPIME) IVPB  1 g Intravenous Q12H    escitalopram oxalate  20 mg Oral Daily    fluticasone  2 spray Each Nare Daily    gabapentin  900 mg Oral TID    guaiFENesin  600 mg Oral BID    heparin (porcine)  5,000 Units Subcutaneous Q8H    insulin detemir  20 Units Subcutaneous QHS    levothyroxine  150 mcg Oral Before breakfast    magnesium oxide  400 mg Oral Daily    mycophenolate  720 mg Oral BID    nortriptyline  25 mg Oral QHS    oseltamivir  30 mg Oral BID    pantoprazole  40 mg Oral Daily    pravastatin  40 mg Oral QHS    predniSONE  2.5 mg Oral Daily    sodium chloride 0.9%  3 mL Intravenous Q8H    tacrolimus  3 mg Oral BID    tamsulosin  0.4 mg Oral Daily    torsemide  40 mg Oral Daily     PRN Meds:albuterol, dextrose 50%, dextrose 50%, glucagon (human recombinant), glucose, glucose, insulin aspart, ondansetron, promethazine-codeine 6.25-10 mg/5 ml    Review of patient's allergies indicates:   Allergen Reactions    No known drug allergies      Objective:     Vital Signs (Most Recent):  Temp: 99 °F (37.2 °C) (12/13/17 0800)  Pulse: 98 (12/13/17 1100)  Resp: 18 (12/13/17 0800)  BP: (!) 96/58 (12/13/17 0922)  SpO2: 95 % (12/13/17 0800) Vital Signs (24h Range):  Temp:  [98.3 °F " (36.8 °C)-99.3 °F (37.4 °C)] 99 °F (37.2 °C)  Pulse:  [] 98  Resp:  [18] 18  SpO2:  [89 %-95 %] 95 %  BP: ()/(42-67) 96/58     Patient Vitals for the past 72 hrs (Last 3 readings):   Weight   12/13/17 0558 84.6 kg (186 lb 9.9 oz)   12/12/17 0500 87.3 kg (192 lb 7.4 oz)   12/11/17 1344 86.2 kg (190 lb)     Body mass index is 29.23 kg/m².      Intake/Output Summary (Last 24 hours) at 12/13/17 1119  Last data filed at 12/13/17 0558   Gross per 24 hour   Intake              100 ml   Output             1000 ml   Net             -900 ml     Hemodynamic Parameters:    Telemetry: Sinus tach    Physical Exam    Constitutional: NAD, conversant  HEENT: Sclera anicteric, PERRLA, EOMI  Neck: Unable to visualize JVD, no carotid bruits  CV: Tachy, no murmur, normal S1/S2  Pulm: some bronchial breath sounds in right base, no crackles or wheezes   GI: Abdomen soft, distended, NTTP, +BS   Extremities: trace edema, right>left  warm and well perfused  Skin: No ecchymosis, erythema, or ulcers  Psych: AOx3, appropriate affect    Significant Labs:  CBC:    Recent Labs  Lab 12/11/17 2227 12/12/17  0401 12/13/17  0348   WBC 6.14 5.88 6.70   RBC 2.83* 2.90* 2.81*   HGB 8.8* 8.6* 8.7*   HCT 26.0* 26.6* 25.7*   * 181 202   MCV 92 92 92   MCH 31.1* 29.7 31.0   MCHC 33.8 32.3 33.9     BNP:    Recent Labs  Lab 12/11/17  1750   *     CMP:    Recent Labs  Lab 12/11/17  1750 12/12/17  0401 12/13/17  0348   GLU 82 163* 208*   CALCIUM 9.3 8.7 8.9   ALBUMIN 3.2*  --  2.8*   PROT 6.7  --  6.1    135* 134*   K 4.3 4.4 4.6   CO2 27 28 28    97 96   BUN 36* 36* 47*   CREATININE 2.6* 2.4* 2.9*   ALKPHOS 244*  --  220*   ALT 18  --  16   AST 30  --  25   BILITOT 1.1*  --  1.2*      Coagulation:   No results for input(s): PT, INR, APTT in the last 168 hours.  LDH:  No results for input(s): LDH in the last 72 hours.  Microbiology:  Microbiology Results (last 7 days)     Procedure Component Value Units Date/Time     Culture, Respiratory with Gram Stain [[id number]] Collected:  12/12/17 1101    Order Status:  Completed Specimen:  Respiratory from Sputum, Expectorated Updated:  12/13/17 0801     Respiratory Culture Normal respiratory hank     Gram Stain (Respiratory) <10 epithelial cells per low power field.     Gram Stain (Respiratory) Rare WBC's     Gram Stain (Respiratory) Few Gram negative rods     Gram Stain (Respiratory) Rare Gram positive cocci    Blood culture #2 **CANNOT BE ORDERED STAT** [[id number]] Collected:  12/11/17 1756    Order Status:  Completed Specimen:  Blood from Peripheral, Hand, Left Updated:  12/12/17 2012     Blood Culture, Routine No Growth to date     Blood Culture, Routine No Growth to date    Blood culture #1 **CANNOT BE ORDERED STAT** [[id number]] Collected:  12/11/17 1750    Order Status:  Completed Specimen:  Blood from Peripheral, Hand, Right Updated:  12/12/17 2012     Blood Culture, Routine No Growth to date     Blood Culture, Routine No Growth to date    Urine culture [[id number]] Collected:  12/11/17 2022    Order Status:  Completed Specimen:  Urine from Urine, Clean Catch Updated:  12/12/17 1723     Urine Culture, Routine --     GRAM NEGATIVE DORYS, LACTOSE   >100,000 cfu/ml  Identification and susceptibility pending      Respiratory Viral Panel by PCR [facility]; Nasal Swab [[id number]] Collected:  12/11/17 2022    Order Status:  Sent Specimen:  Respiratory Updated:  12/11/17 2050          I have reviewed all pertinent labs within the past 24 hours.    Estimated Creatinine Clearance: 33.8 mL/min (based on SCr of 2.9 mg/dL (H)).    Diagnostic Results:  I have reviewed all pertinent imaging results/findings within the past 24 hours.    Assessment and Plan:       Lv Crocker is a  40 yo  male s/p OHTX 11/14, complicated by AMR requiring intensive immunosuppression subsequent to which he developed pancytopenia and resolution with continued recurrent ascites requiring scheduled monthly  paracentesis presenting with 10 days complain of cough and pleurisy. Patient has been visiting urgent care with similar symptoms for the last three days and romero including CXR, blood cultures as well as urine culture has been negative. He was prescribed inhaler for possible URI and discharged two days ago. Today, he came to the ER with Fever of 103 and generalized myalgia. he report dry cough, constant throughout the day and sometimes causing him to vomit after an episode. He denies sick contact, hemoptysis or recent travel. He did get both influenza and pneumonia immunization. His last paracentesis was three weeks ago without any complication to the site or internally. He denies weight gain, leg edema, PND, orthopnea or AGUILAR. He also denies dysuria, hematuria, sinus tenderness or drainage. He had an XR yesterday that showed persistent R-sided ascites without without clear infiltrate. He reports compliant with current OHT regimen and that his prograf was changed to 3:3 dose three weeks ago.    * Cough in adult    - Unclear source  - Prior bx/IVUS results negative for CAV  - start broad spec abx including vanco x1 , continue cefepime and azithromycin (renal dose)  - consult Transplant ID for assistance  - Pan culture including respiratory panel, flu and strep -yana, CMV pending.   - Bcx NGTD  - CXR + for recollection to the R- but not significant for repeat paracentesis.  - CT chest w/ Moderate volume right pleural fluid that is worsening (chronic).  Bilateral diffuse patchy consolidation and groundglass densities that are new since radiograph 11/30/2017 and appear worse than chest radiograph 12/10/2017.  - DDx: Pneumonia   - Per ID, thoracentesis if possible. Appreciate help of Pulmonary    - may have paracentesis Friday for volume removal, will space out procedures   - no symptoms of UTI, however GNR, lactose  > 100,000 in urine (do not need to treat per ID)  - patient states cough medicine does not work,  afebrile since admit. Will treat symptoms accordingly. 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  - monthly scheduled paracentesis. Last one three weeks ago. May need repeat Friday due to worsening renal function and worsening R LL effusion  - CXR + for recollection to the R- but not significant for repeat paracentesis.  - continue demedex as current and fluid restriction.        Type 1 diabetes mellitus with hyperglycemia    - on Humalog BID at home  - started on detemir daily with ISS  - monitor closely and adjust as needed        Chronic kidney disease (CKD), stage III (moderate)    - mild CACHORRO on CKD. Baseline creat is ~2.2 (up to 2.9 today, may be due to volume?)  - possibly related to current SIRS   - renal dose abx and DVT prophylaxis  - monitor closely during stay        Heart transplanted    - S/P OHTx 11/8/14 (high risk)   - history of AMR treated with PP and IVIG 4/2016   - Biopsy 9/12/16 negative for rejection   - [facility] 2/10/16 no CAV  - Prograf recently increased to 3/3, continue pred 2.5 and Myfortic 720 BID  - Prograf goal 6-8  - Repeat echo w/ preserved EF. Patient has known (chronic) restriction since transplant                Kelsea Ryan PA-C  Heart Transplant  [facility]

## 2017-12-13 NOTE — PLAN OF CARE
Problem: Patient Care Overview  Goal: Plan of Care Review  Outcome: Ongoing (interventions implemented as appropriate)  Pt AAOx4, afebrile, free of falls. Pt's spouse at bedside overnight. Pt denies pain, SOB, CP. Pt requests MD to inform him of results from today's testings. Tamiflu started overnight, tolerating well. Pt remains on IV Abx at this time. No acute changes, WCTM.

## 2017-12-13 NOTE — ASSESSMENT & PLAN NOTE
This is a case of cough/URI symptoms in the setting of immunosuppression for previous transplant and abnormal CT chest findings. CT chest shows bilateral consolidative opacities likely representing a pneumonic process. Patient is currently on broad-spectrum ABX, Vanc/Cefepime/Azithromycin, after cultures have been collected, as well as Tamiflu. Patient has been afebrile while in the hospital. There is some concern regarding the R pleural effusion - as per previous CT and chest x-rays, this effusion is unchanged and likely not the source of infection. Upon request of transplant ID, will attempt diagnostic thoracentesis this afternoon and send samples for cell count, chemistries, micro, and cytology. I suspect the pleural process is likely in connection with the recurrent ascites. Would suggest using Hycodan for the cough as other agents have not helped him. Would continue antibiotics as ordered.

## 2017-12-13 NOTE — SUBJECTIVE & OBJECTIVE
"Interval History: No complaints overnight other than continued cough. Although he states codeine made him "cough worse" in the past he is willing to try something similar again. Otherwise no NVD, denies chest pain, SOB, fever. Thinks he feels a little better and more energetic today.     Continuous Infusions:   Scheduled Meds:   aspirin  81 mg Oral Daily    azithromycin  500 mg Intravenous Q24H    ceFEPime (MAXIPIME) IVPB  1 g Intravenous Q12H    escitalopram oxalate  20 mg Oral Daily    fluticasone  2 spray Each Nare Daily    gabapentin  900 mg Oral TID    guaiFENesin  600 mg Oral BID    heparin (porcine)  5,000 Units Subcutaneous Q8H    insulin detemir  20 Units Subcutaneous QHS    levothyroxine  150 mcg Oral Before breakfast    magnesium oxide  400 mg Oral Daily    mycophenolate  720 mg Oral BID    nortriptyline  25 mg Oral QHS    oseltamivir  30 mg Oral BID    pantoprazole  40 mg Oral Daily    pravastatin  40 mg Oral QHS    predniSONE  2.5 mg Oral Daily    sodium chloride 0.9%  3 mL Intravenous Q8H    tacrolimus  3 mg Oral BID    tamsulosin  0.4 mg Oral Daily    torsemide  40 mg Oral Daily     PRN Meds:albuterol, dextrose 50%, dextrose 50%, glucagon (human recombinant), glucose, glucose, insulin aspart, ondansetron, promethazine-codeine 6.25-10 mg/5 ml    Review of patient's allergies indicates:   Allergen Reactions    No known drug allergies      Objective:     Vital Signs (Most Recent):  Temp: 99 °F (37.2 °C) (12/13/17 0800)  Pulse: 98 (12/13/17 1100)  Resp: 18 (12/13/17 0800)  BP: (!) 96/58 (12/13/17 0922)  SpO2: 95 % (12/13/17 0800) Vital Signs (24h Range):  Temp:  [98.3 °F (36.8 °C)-99.3 °F (37.4 °C)] 99 °F (37.2 °C)  Pulse:  [] 98  Resp:  [18] 18  SpO2:  [89 %-95 %] 95 %  BP: ()/(42-67) 96/58     Patient Vitals for the past 72 hrs (Last 3 readings):   Weight   12/13/17 0558 84.6 kg (186 lb 9.9 oz)   12/12/17 0500 87.3 kg (192 lb 7.4 oz)   12/11/17 1344 86.2 kg (190 lb) "     Body mass index is 29.23 kg/m².      Intake/Output Summary (Last 24 hours) at 12/13/17 1119  Last data filed at 12/13/17 0558   Gross per 24 hour   Intake              100 ml   Output             1000 ml   Net             -900 ml     Hemodynamic Parameters:    Telemetry: Sinus tach    Physical Exam    Constitutional: NAD, conversant  HEENT: Sclera anicteric, PERRLA, EOMI  Neck: Unable to visualize JVD, no carotid bruits  CV: Tachy, no murmur, normal S1/S2  Pulm: some bronchial breath sounds in right base, no crackles or wheezes   GI: Abdomen soft, distended, NTTP, +BS   Extremities: trace edema, right>left  warm and well perfused  Skin: No ecchymosis, erythema, or ulcers  Psych: AOx3, appropriate affect    Significant Labs:  CBC:    Recent Labs  Lab 12/11/17  2227 12/12/17  0401 12/13/17  0348   WBC 6.14 5.88 6.70   RBC 2.83* 2.90* 2.81*   HGB 8.8* 8.6* 8.7*   HCT 26.0* 26.6* 25.7*   * 181 202   MCV 92 92 92   MCH 31.1* 29.7 31.0   MCHC 33.8 32.3 33.9     BNP:    Recent Labs  Lab 12/11/17  1750   *     CMP:    Recent Labs  Lab 12/11/17  1750 12/12/17  0401 12/13/17  0348   GLU 82 163* 208*   CALCIUM 9.3 8.7 8.9   ALBUMIN 3.2*  --  2.8*   PROT 6.7  --  6.1    135* 134*   K 4.3 4.4 4.6   CO2 27 28 28    97 96   BUN 36* 36* 47*   CREATININE 2.6* 2.4* 2.9*   ALKPHOS 244*  --  220*   ALT 18  --  16   AST 30  --  25   BILITOT 1.1*  --  1.2*      Coagulation:   No results for input(s): PT, INR, APTT in the last 168 hours.  LDH:  No results for input(s): LDH in the last 72 hours.  Microbiology:  Microbiology Results (last 7 days)     Procedure Component Value Units Date/Time    Culture, Respiratory with Gram Stain [[id number]] Collected:  12/12/17 1101    Order Status:  Completed Specimen:  Respiratory from Sputum, Expectorated Updated:  12/13/17 0801     Respiratory Culture Normal respiratory hank     Gram Stain (Respiratory) <10 epithelial cells per low power field.     Gram Stain  (Respiratory) Rare WBC's     Gram Stain (Respiratory) Few Gram negative rods     Gram Stain (Respiratory) Rare Gram positive cocci    Blood culture #2 **CANNOT BE ORDERED STAT** [[id number]] Collected:  12/11/17 1756    Order Status:  Completed Specimen:  Blood from Peripheral, Hand, Left Updated:  12/12/17 2012     Blood Culture, Routine No Growth to date     Blood Culture, Routine No Growth to date    Blood culture #1 **CANNOT BE ORDERED STAT** [[id number]] Collected:  12/11/17 1750    Order Status:  Completed Specimen:  Blood from Peripheral, Hand, Right Updated:  12/12/17 2012     Blood Culture, Routine No Growth to date     Blood Culture, Routine No Growth to date    Urine culture [[id number]] Collected:  12/11/17 2022    Order Status:  Completed Specimen:  Urine from Urine, Clean Catch Updated:  12/12/17 1723     Urine Culture, Routine --     GRAM NEGATIVE DORYS, LACTOSE   >100,000 cfu/ml  Identification and susceptibility pending      Respiratory Viral Panel by PCR [facility]; Nasal Swab [[id number]] Collected:  12/11/17 2022    Order Status:  Sent Specimen:  Respiratory Updated:  12/11/17 2050          I have reviewed all pertinent labs within the past 24 hours.    Estimated Creatinine Clearance: 33.8 mL/min (based on SCr of 2.9 mg/dL (H)).    Diagnostic Results:  I have reviewed all pertinent imaging results/findings within the past 24 hours.

## 2017-12-13 NOTE — PROGRESS NOTES
Admit Note     Met with patient to assess needs. Patient is a 42 y.o.  male, admitted for cough.    Pt is s/p heart transplant.11/18/14.    Patient admitted from home on 12/11/2017 .  At this time, patient presents as alert and oriented x 4, pleasant and good eye contact.  At this time, patients caregiver is not in attendance.    Household/Family Systems     Patient resides with patient's spouse and two children ages 15 and 20, at     [address] [place] [address].      Support system includes spouse, children and extended family.  Patient does have dependents that are in need of being cared for. Patient's 15 yr old is being cared for by pt's spouse.     Patients primary caregiver is Elizabeth Lizzette, patients spouse, phone number [phone].    Pt's cell: [phone]  David Crocker (brother) [phone]  Annalisa Navas (mother in law) [phone].    During admission, patient's caregiver plans to stay at home.  Confirmed patient and patients caregivers do have access to reliable transportation.    Cognitive Status/Learning     Patient reports reading ability as college and states patient does not have difficulty with reading, writing, seeing, hearing, comprehension and learning. Pt reports issues with short term memory.  Patient reports patient learns best by one on one.   Needed: No.   Highest education level: Attended College/Technical School    Vocation/Disability   .  Working for Income: No  If no, reason not working: Disability  Patient is disabled due to heart failure  since 2011.  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.   Pt's spouse works full time at [employer] in [employer].    Adherence     Patient reports a high level of adherence to patients health care regimen.  Adherence counseling and education provided.  Patient's caregiver verbalizes understanding.    Substance Use    Patient reports the following  substance usage.    Tobacco: none, patient denies any use.  Alcohol: none, patient denies any use.  Illicit Drugs/Non-prescribed Medications: none, patient denies any use.  Patient states clear understanding of the potential impact of substance use.  Substance abstinence/cessation counseling, education and resources provided and reviewed.     Services Utilizing/ADLS    Infusion Service: Prior to admission, patient utilizing? no  Home Health: Prior to admission, patient utilizing? no Pt  has used [facility] in the past  DME: Prior to admission, no  Pulmonary/Cardiac Rehab: Prior to admission, no Pt has gone to [facility] in the past  Dialysis:  Prior to admission, no  Transplant Specialty Pharmacy:  Prior to admission, no.    Prior to admission, patient reports patient was independent with ADLS and was driving. Pt's spouse also drives.  Patient reports patient is able to care for self at this time..  Patient indicates a willingness to care for self once medically cleared to do so.    Insurance/Medications    Payor/Plan Subscr  Sex Relation Sub. Ins. ID Effective Group Num   1. MEDICARE - ME* DARRELL OLIVAS M* 1975 Male  [id number]                                    [address]   2. BCBS MGD MEDI* DARRELL OLIVAS M* 1975 Male  [id number]                                   [address] [address]          Primary Insurance (for UNOS reporting): Public Insurance - Medicare FFS (Fee For Service)  Secondary Insurance (for UNOS reporting): None    Patient reports patient is able to obtain and afford medications at this time and at time of discharge.    Living Will/Healthcare Power of     Patient states patient has a LW and/or HCPA. Pt's spouse is the HCPA.  provided education regarding LW and HCPA and the completion of forms.    Coping/Mental Health    Patient is coping adequately with the aid of  family members. .  Patient indicates mental health difficulties. The  "pt reports issues with depression and anxiety.  The pt takes Lexapro for depression and states he does not feel his symptoms are adequately managed. SW recommends pt see a psychiatrist out patient for medication management. Pt confirms understanding and states he "will think about it". SW provided counseling support.     Discharge Planning    At time of discharge, patient plans to return to patient's home under the care of self and spouse.  Patients spouse will transport patient.  Per rounds today, expected discharge date has not been medically determined at this time. Patient and patients caretaker verbalize understanding and are involved in treatment planning and discharge process.    Additional Concerns    Patient is being followed for needs, education, resources, information, emotional support, supportive counseling, and for supportive and skilled discharge plan of care.  providing ongoing psychosocial support, education, resources and d/c planning as needed.  SW remains available.  remains available. Patient denies additional needs and/or concerns at this time. Patient verbalizes understanding and agreement with information reviewed, social work availability, and how to access available resources as needed.  "

## 2017-12-13 NOTE — CONSULTS
[facility]  Pulmonology  Consult Note    Patient Name: Lv Crocker  MRN: [id number]  Admission Date: 12/11/2017  Hospital Length of Stay: 2 days  Code Status: Full Code  Attending Physician: Jose A Lloyd MD  Primary Care Provider: Philippe Mohr MD   Principal Problem: Cough in adult    Inpatient consult to Pulmonology  Consult performed by: KIRA CONTRERAS  Consult ordered by: MISTI ANTONY        Subjective:     HPI:  Mr. Crocker is a 41 yo WM with a PMH significant for heart transplant in 11/2014 (on Prograf, Myfortic, and Prednisone), Hashimoto's thyroiditis, DM 2, CKD who presented on 12/12 for worsening cough. Patient states that he started to have cold symptoms approximately 2 weeks ago with progressive worsening over the last 3-4 days. Patient complains most about his AGUILAR and cough that has gotten intolerable - patient started to have L sided back pain and emesis from the cough. He did go see his PCP who prescribed him an anti-tussive, as well as urgent care where he received an inhaler. Patient has had subjective fevers at home, but otherwise highest recorded temp was 100.3. Patient denies hemoptysis or sputum production - the flem he brings up is clear in color. Patient has not had any sick contacts or recent travel. Prior to getting sick, his only visit was to the hospital clinic for annual transplant check-up appointment. To note, patient also has a history of abdominal ascites thought to be 2/2 to passive congestion requiring paracentesis q3-4 weeks. Upon arriving to the hospital, patient was pan-cultured, flu swab was (-), and patient was started on broad spectrum abx, as well as Tamiflu. Repeat TTE was done which did not show any new abnormalities. CXR and CT chest were obtained - bilateral pneumonic process noted on both modalities. R pleural effusion has slightly increased, but unchanged since transplant.    Past Medical History:   Diagnosis Date    Arthritis   "   CAD (coronary artery disease)     H/o Coronary artery disease; resolved since heart transplant 2014    Cardiomyopathy     CHF (congestive heart failure)     Previously EF 20% (prior to heart transplant); last EF 55-60%; throught to be 2/2 genetics & DM1    Depression     Controlled "for the most part" on Lexipro    Encounter for blood transfusion     during transplant    GERD (gastroesophageal reflux disease)     Hashimoto's disease     HLD (hyperlipidemia) 6/11/2015    Hypoglycemia unawareness in type 1 diabetes mellitus     Hypothyroid     Initially hyperthyroid 2/2 Hoshimoto's thyroiditis; now on levothyroxine 150 mcg qd    Myocardial infarction     h/o MI x3 prior to heart transplant    Syncope 6/30/2015    Type I (juvenile type) diabetes mellitus with unspecified complication, uncontrolled     Controlled; Dx'd @5y/o; on N insulin 20U BID & Humalog 10U w/meals +SSI; Glu 89 11/9/17; A1c 7.2% 4/5/17    Unspecified essential hypertension 05/29/2014    Well controlled; Last /57 on 11/9/17     Past Surgical History:   Procedure Laterality Date    CARDIAC CATHETERIZATION      CARDIAC SURGERY      CHOLECYSTECTOMY      CORONARY ANGIOPLASTY      FOOT SPLIT TRANSFER OF THE POSTERIOR TIBIALIS TENDON PROCEDURE      HEART TRANSPLANT  2014    KNEE SURGERY      s/p oht  November 2014    stent placemnet       Review of patient's allergies indicates:   Allergen Reactions    No known drug allergies      Family History     Problem Relation (Age of Onset)    Cancer Brother (50)    Diabetes Mother, Father, Brother, Son, Sister, Maternal Uncle, Paternal Aunt, Maternal Grandmother, Maternal Grandfather    Heart disease Mother, Brother    Hypertension Mother, Father, Brother    Kidney disease Mother, Father    Stroke Father, Brother    Vision loss Brother        Social History Main Topics    Smoking status: Never Smoker    Smokeless tobacco: Never Used    Alcohol use No    Drug use: No    Sexual " activity: Yes     Partners: Female     Review of Systems   Constitutional: Positive for fatigue and fever. Negative for chills and diaphoresis.   HENT: Negative.    Eyes: Negative.    Respiratory: Positive for cough and shortness of breath. Negative for chest tightness and wheezing.    Cardiovascular: Negative.    Gastrointestinal: Negative.    Endocrine: Negative.    Genitourinary: Negative.    Musculoskeletal: Negative.    Skin: Negative.    Neurological: Negative.      Objective:     Vital Signs (Most Recent):  Temp: 99 °F (37.2 °C) (12/13/17 0800)  Pulse: 98 (12/13/17 1100)  Resp: 18 (12/13/17 0800)  BP: (!) 96/58 (12/13/17 0922)  SpO2: 95 % (12/13/17 0800) Vital Signs (24h Range):  Temp:  [98.4 °F (36.9 °C)-99.3 °F (37.4 °C)] 99 °F (37.2 °C)  Pulse:  [] 98  Resp:  [18] 18  SpO2:  [89 %-95 %] 95 %  BP: ()/(42-67) 96/58     Weight: 84.6 kg (186 lb 9.9 oz)  Body mass index is 29.23 kg/m².    Intake/Output Summary (Last 24 hours) at 12/13/17 1308  Last data filed at 12/13/17 0558   Gross per 24 hour   Intake              100 ml   Output             1000 ml   Net             -900 ml     Physical Exam   Constitutional: He is oriented to person, place, and time. He appears well-developed and well-nourished. No distress.   HENT:   Head: Normocephalic and atraumatic.   Mouth/Throat: Oropharynx is clear and moist. No oropharyngeal exudate.   Eyes: Conjunctivae and EOM are normal. Pupils are equal, round, and reactive to light. No scleral icterus.   Neck: Normal range of motion. Neck supple.   Cardiovascular: Normal rate, regular rhythm, normal heart sounds and intact distal pulses.    No murmur heard.  Pulmonary/Chest: Effort normal. No respiratory distress. He has no wheezes. He has no rales. He exhibits no tenderness.   Decreased breath sounds R base.   Abdominal: Soft. Bowel sounds are normal. He exhibits distension. There is no tenderness.   Musculoskeletal: Normal range of motion. He exhibits no edema.    Lymphadenopathy:     He has no cervical adenopathy.   Neurological: He is alert and oriented to person, place, and time.   Skin: Skin is warm and dry. He is not diaphoretic.   Psychiatric: He has a normal mood and affect. Judgment normal.   Nursing note and vitals reviewed.    Lines/Drains/Airways     Peripheral Intravenous Line                 Peripheral IV - Single Lumen 12/11/17 1750 Right Hand 1 day              Significant Labs:    CBC/Anemia Profile:    Recent Labs  Lab 12/11/17 2227 12/12/17  0401 12/13/17  0348   WBC 6.14 5.88 6.70   HGB 8.8* 8.6* 8.7*   HCT 26.0* 26.6* 25.7*   * 181 202   MCV 92 92 92   RDW 12.2 12.4 12.1     Chemistries:    Recent Labs  Lab 12/11/17  1750 12/11/17 2227 12/12/17 0401 12/13/17  0348     --  135* 134*   K 4.3  --  4.4 4.6     --  97 96   CO2 27  --  28 28   BUN 36*  --  36* 47*   CREATININE 2.6*  --  2.4* 2.9*   CALCIUM 9.3  --  8.7 8.9   ALBUMIN 3.2*  --   --  2.8*   PROT 6.7  --   --  6.1   BILITOT 1.1*  --   --  1.2*   ALKPHOS 244*  --   --  220*   ALT 18  --   --  16   AST 30  --   --  25   MG  --  1.9  --   --      All pertinent labs within the past 24 hours have been reviewed.    Significant Imaging:   I have reviewed and interpreted all pertinent imaging results/findings within the past 24 hours.    Assessment/Plan:     Pneumonia    This is a case of cough/URI symptoms in the setting of immunosuppression for previous transplant and abnormal CT chest findings. CT chest shows bilateral consolidative opacities likely representing a pneumonic process. Patient is currently on broad-spectrum ABX, Vanc/Cefepime/Azithromycin, after cultures have been collected, as well as Tamiflu. Patient has been afebrile while in the hospital. There is some concern regarding the R pleural effusion - as per previous CT and chest x-rays, this effusion is unchanged and likely not the source of infection. Upon request of transplant ID, will attempt diagnostic  thoracentesis this afternoon and send samples for cell count, chemistries, micro, and cytology. I suspect the pleural process is likely in connection with the recurrent ascites. Would suggest using Hycodan for the cough as other agents have not helped him. Would continue antibiotics as ordered.          Thank you for involving us in the care of this patient. We will continue to follow. Please call us with any questions.    Abbie Coleman MD  [facility] Fellow, PGY 4  [facility]  Pager: [phone]

## 2017-12-13 NOTE — ASSESSMENT & PLAN NOTE
- S/P OHTx 11/8/14 (high risk)   - history of [facility] treated with PP and IVIG 4/2016   - Biopsy 9/12/16 negative for rejection   - [facility] 2/10/16 no CAV  - Prograf recently increased to 3/3, continue pred 2.5 and Myfortic 720 BID  - Prograf goal 6-8  - Repeat echo w/ preserved EF. Patient has known (chronic) restriction since transplant

## 2017-12-13 NOTE — ASSESSMENT & PLAN NOTE
- mild CACHORRO on CKD. Baseline creat is ~2.2 (up to 2.9 today, may be due to volume?)  - possibly related to current SIRS   - renal dose abx and DVT prophylaxis  - monitor closely during stay

## 2017-12-13 NOTE — PROCEDURES
"Lv Crocker is a 42 y.o. male patient.    Temp: 99 °F (37.2 °C) (17 0800)  Pulse: 98 (17 1100)  Resp: 18 (17 0800)  BP: (!) 96/58 (17 0922)  SpO2: 95 % (17 0800)  Weight: 84.6 kg (186 lb 9.9 oz) (17 0558)  Height: 5' 7" (170.2 cm) (17 1344)     Thoracentesis  Date/Time: 2017 2:46 PM  Location procedure was performed: [facility] MEDICAL SURGICAL UNIT  Performed by: KIRA CONTRERAS  Authorized by: KIRA CONTRERAS   Assisting provider: CESAR JIMENES  Pre-operative diagnosis: R pleural effusion  Post-operative diagnosis: R pleural effusion  Consent Done: Yes  Consent: Written consent obtained.  Risks and benefits: risks, benefits and alternatives were discussed  Consent given by: patient  Patient understanding: patient states understanding of the procedure being performed  Patient consent: the patient's understanding of the procedure matches consent given  Procedure consent: procedure consent matches procedure scheduled  Relevant documents: relevant documents present and verified  Test results: test results available and properly labeled  Site marked: the operative site was marked  Imaging studies: imaging studies available  Patient identity confirmed: , MRN, name and verbally with patient  Time out: Immediately prior to procedure a "time out" was called to verify the correct patient, procedure, equipment, support staff and site/side marked as required.  Procedure purpose: diagnostic  Indications: pleural effusion  Preparation: Patient was prepped and draped in the usual sterile fashion.  Local anesthesia used: yes  Anesthesia: local infiltration    Anesthesia:  Local anesthesia used: yes  Local Anesthetic: lidocaine 1% without epinephrine  Anesthetic total: 10 mL  Description of findings: Yellow-orange cloudy fluid   Preparation: skin prepped with ChloraPrep  Patient position: supported by bedside stand  Ultrasound guidance: no  Location: right " posterior  Intercostal space: 6th  Puncture method: over-the-needle catheter  Needle size: 18  Catheter size: 14 gauge  Number of attempts: 2  Drainage amount: 1350 ml  Drainage characteristics: cloudy  Patient tolerance: Patient tolerated the procedure well with no immediate complications  Chest x-ray performed: yes  Chest x-ray interpreted by me.  Chest x-ray findings: pleural effusion  Technical procedures used: U/S used to hamida site of entry  Significant surgical tasks conducted by the assistant(s): -  Complications: No  Specimens: Yes  Implants: No  Comments: Pleural fluid sent for micro, chemistries, and cytology.      Abbie Coleman MD  [facility] Fellow, PGY 4  [facility]  Pager: [phone]

## 2017-12-14 NOTE — SUBJECTIVE & OBJECTIVE
Interval History: No acute events overnight. Patient feels like his SOB has improved. He continues to cough. 1350 cc of pleural fluid was removed yesterday. CXR (-) for pneumothorax.    Objective:     Vital Signs (Most Recent):  Temp: 98.3 °F (36.8 °C) (12/14/17 1208)  Pulse: 102 (12/14/17 1208)  Resp: 17 (12/14/17 1208)  BP: (!) 101/55 (12/14/17 1208)  SpO2: (!) 94 % (12/14/17 1208) Vital Signs (24h Range):  Temp:  [97.7 °F (36.5 °C)-98.5 °F (36.9 °C)] 98.3 °F (36.8 °C)  Pulse:  [] 102  Resp:  [14-18] 17  SpO2:  [93 %-97 %] 94 %  BP: ()/(44-58) 101/55     Weight: 84.6 kg (186 lb 9.9 oz)  Body mass index is 29.23 kg/m².    Intake/Output Summary (Last 24 hours) at 12/14/17 1238  Last data filed at 12/14/17 0900   Gross per 24 hour   Intake              740 ml   Output                0 ml   Net              740 ml     Physical Exam   Constitutional: He is oriented to person, place, and time. He appears well-developed and well-nourished. No distress.   HENT:   Head: Normocephalic and atraumatic.   Mouth/Throat: Oropharynx is clear and moist. No oropharyngeal exudate.   Eyes: Conjunctivae and EOM are normal. Pupils are equal, round, and reactive to light. No scleral icterus.   Neck: Normal range of motion. Neck supple.   Cardiovascular: Normal rate, regular rhythm, normal heart sounds and intact distal pulses.    No murmur heard.  Pulmonary/Chest: Effort normal. No respiratory distress. He has no wheezes. He has no rales. He exhibits no tenderness.   Decreased breath sounds R base.   Abdominal: Soft. Bowel sounds are normal. He exhibits distension. There is no tenderness.   Musculoskeletal: Normal range of motion. He exhibits no edema.   Lymphadenopathy:     He has no cervical adenopathy.   Neurological: He is alert and oriented to person, place, and time.   Skin: Skin is warm and dry. He is not diaphoretic.   Psychiatric: He has a normal mood and affect. Judgment normal.   Nursing note and vitals  reviewed.    Lines/Drains/Airways     Peripheral Intravenous Line                 Peripheral IV - Single Lumen 12/11/17 1750 Right Hand 2 days              Significant Labs:    CBC/Anemia Profile:    Recent Labs  Lab 12/13/17  0348 12/14/17  0353   WBC 6.70 5.29   HGB 8.7* 8.8*   HCT 25.7* 26.4*    239   MCV 92 91   RDW 12.1 12.2     Chemistries:    Recent Labs  Lab 12/13/17  0348 12/13/17  1504 12/14/17  0353 12/14/17  1159   *  --  135* 132*   K 4.6  --  4.6 4.4   CL 96  --  97 94*   CO2 28  --  27 27   BUN 47*  --  56* 58*   CREATININE 2.9*  --  3.5* 3.6*   CALCIUM 8.9  --  8.6* 8.7   ALBUMIN 2.8*  --  2.6*  --    PROT 6.1 6.4 5.8*  --    BILITOT 1.2*  --  0.7  --    ALKPHOS 220*  --  194*  --    ALT 16  --  13  --    AST 25  --  20  --      All pertinent labs within the past 24 hours have been reviewed.    Significant Imaging:  I have reviewed and interpreted all pertinent imaging results/findings within the past 24 hours.

## 2017-12-14 NOTE — ASSESSMENT & PLAN NOTE
- Unclear source  - Prior bx/IVUS results negative for CAV  - start broad spec abx including vanco x1, continue cefepime and azithromycin (renal dose)  - consult Transplant ID for assistance  - Pan culture including respiratory panel, flu and strep -yana, CMV pending.   - Bcx NGTD  - CXR + for recollection to the R-  - CT chest w/ Moderate volume right pleural fluid that is worsening (chronic).  Bilateral diffuse patchy consolidation and groundglass densities that are new since radiograph 11/30/2017 and appear worse than chest radiograph 12/10/2017.  - DDx: Pneumonia   - Thoracentesis yesterday, awaiting results. Appreciate help of Pulmonary    - May have paracentesis Thursday/Friday for volume removal. Consulted IR today.  - No symptoms of UTI, however GNR, Klebsiella pneumonia> 100,000 in urine (do not need to treat per ID)  - Cough much improved by promethazine-codeine today

## 2017-12-14 NOTE — SUBJECTIVE & OBJECTIVE
Interval History: No complaints overnight, cough much improved. Will adjust insulin regimen, as patient states he is always uncontrolled in the hospital when we take him off his long acting insulin.  Good UOP overnight (-1L) per patient although not recorded.   Cr bumped today, higher than baseline. Denies flank pain, hematuria, fever. Consulted nephrology. Will also consult Interventional Radiology as patient is due for paracentesis and this may be contributing to his elevated Cr.    Continuous Infusions:  Scheduled Meds:   aspirin  81 mg Oral Daily    azithromycin  500 mg Intravenous Q24H    ceFEPime (MAXIPIME) IVPB  1 g Intravenous Q12H    escitalopram oxalate  20 mg Oral Daily    fluticasone  2 spray Each Nare Daily    gabapentin  900 mg Oral TID    guaiFENesin  600 mg Oral BID    heparin (porcine)  5,000 Units Subcutaneous Q8H    insulin aspart  5 Units Subcutaneous TIDWM    levothyroxine  150 mcg Oral Before breakfast    lidocaine HCL 10 mg/ml (1%)  10 mL Other Once    magnesium oxide  400 mg Oral Daily    mycophenolate  720 mg Oral BID    nortriptyline  25 mg Oral QHS    pantoprazole  40 mg Oral Daily    pravastatin  40 mg Oral QHS    predniSONE  2.5 mg Oral Daily    sodium chloride 0.9%  3 mL Intravenous Q8H    tacrolimus  1 mg Oral Once    [START ON 12/15/2017] tacrolimus  2 mg Oral BID    tamsulosin  0.4 mg Oral Daily    torsemide  40 mg Oral Daily     PRN Meds:albuterol, dextrose 50%, dextrose 50%, glucagon (human recombinant), glucose, glucose, insulin aspart, ondansetron, promethazine-codeine 6.25-10 mg/5 ml    Review of patient's allergies indicates:   Allergen Reactions    No known drug allergies      Objective:     Vital Signs (Most Recent):  Temp: 98.3 °F (36.8 °C) (12/14/17 0757)  Pulse: 97 (12/14/17 1100)  Resp: 18 (12/14/17 0757)  BP: (!) 94/58 (12/14/17 0757)  SpO2: (!) 94 % (12/14/17 0757) Vital Signs (24h Range):  Temp:  [97.7 °F (36.5 °C)-98.5 °F (36.9 °C)] 98.3 °F  (36.8 °C)  Pulse:  [] 97  Resp:  [14-18] 18  SpO2:  [93 %-97 %] 94 %  BP: (80-97)/(44-58) 94/58     Patient Vitals for the past 72 hrs (Last 3 readings):   Weight   12/13/17 0558 84.6 kg (186 lb 9.9 oz)   12/12/17 0500 87.3 kg (192 lb 7.4 oz)   12/11/17 1344 86.2 kg (190 lb)     Body mass index is 29.23 kg/m².      Intake/Output Summary (Last 24 hours) at 12/14/17 1142  Last data filed at 12/14/17 0900   Gross per 24 hour   Intake              840 ml   Output                0 ml   Net              840 ml     Hemodynamic Parameters:    Telemetry: Sinus tach    Physical Exam  Constitutional: NAD, conversant  HEENT: Sclera anicteric, PERRLA, EOMI  Neck: Unable to visualize JVD, no carotid bruits  CV: Tachy, no murmur, normal S1/S2  Pulm: some bronchial breath sounds in right base, no crackles or wheezes   GI: Abdomen soft, distended, NTTP, +BS. No flank TTP.  Extremities: trace edema, right>left  warm and well perfused  Skin: No ecchymosis, erythema, or ulcers  Psych: AOx3, appropriate affect    Significant Labs:  CBC:    Recent Labs  Lab 12/12/17  0401 12/13/17  0348 12/14/17  0353   WBC 5.88 6.70 5.29   RBC 2.90* 2.81* 2.90*   HGB 8.6* 8.7* 8.8*   HCT 26.6* 25.7* 26.4*    202 239   MCV 92 92 91   MCH 29.7 31.0 30.3   MCHC 32.3 33.9 33.3     BNP:    Recent Labs  Lab 12/11/17  1750   *     CMP:    Recent Labs  Lab 12/11/17  1750 12/12/17  0401 12/13/17  0348 12/13/17  1504 12/14/17  0353   GLU 82 163* 208*  --  105   CALCIUM 9.3 8.7 8.9  --  8.6*   ALBUMIN 3.2*  --  2.8*  --  2.6*   PROT 6.7  --  6.1 6.4 5.8*    135* 134*  --  135*   K 4.3 4.4 4.6  --  4.6   CO2 27 28 28  --  27    97 96  --  97   BUN 36* 36* 47*  --  56*   CREATININE 2.6* 2.4* 2.9*  --  3.5*   ALKPHOS 244*  --  220*  --  194*   ALT 18  --  16  --  13   AST 30  --  25  --  20   BILITOT 1.1*  --  1.2*  --  0.7      Coagulation:   No results for input(s): PT, INR, APTT in the last 168 hours.  LDH:    Recent Labs  Lab  12/13/17  1504        Microbiology:  Microbiology Results (last 7 days)     Procedure Component Value Units Date/Time    Culture, Respiratory with Gram Stain [[id number]] Collected:  12/12/17 1101    Order Status:  Completed Specimen:  Respiratory from Sputum, Expectorated Updated:  12/14/17 0926     Respiratory Culture Normal respiratory hank     Gram Stain (Respiratory) <10 epithelial cells per low power field.     Gram Stain (Respiratory) Rare WBC's     Gram Stain (Respiratory) Few Gram negative rods     Gram Stain (Respiratory) Rare Gram positive cocci    Aerobic culture [[id number]] Collected:  12/13/17 1448    Order Status:  Completed Specimen:  Body Fluid from Pleural Fluid Updated:  12/14/17 0749     Aerobic Bacterial Culture No growth    Blood culture #1 **CANNOT BE ORDERED STAT** [[id number]] Collected:  12/11/17 1750    Order Status:  Completed Specimen:  Blood from Peripheral, Hand, Right Updated:  12/13/17 2012     Blood Culture, Routine No Growth to date     Blood Culture, Routine No Growth to date     Blood Culture, Routine No Growth to date    Blood culture #2 **CANNOT BE ORDERED STAT** [[id number]] Collected:  12/11/17 1756    Order Status:  Completed Specimen:  Blood from Peripheral, Hand, Left Updated:  12/13/17 2012     Blood Culture, Routine No Growth to date     Blood Culture, Routine No Growth to date     Blood Culture, Routine No Growth to date    Urine culture [[id number]]  (Susceptibility) Collected:  12/11/17 2022    Order Status:  Completed Specimen:  Urine from Urine, Clean Catch Updated:  12/13/17 1809     Urine Culture, Routine --     KLEBSIELLA PNEUMONIAE  >100,000 cfu/ml      KOH prep [[id number]] Collected:  12/13/17 1455    Order Status:  Completed Specimen:  Body Fluid from Pleural Fluid Updated:  12/13/17 1736     KOH Prep No yeast or fungal elements seen    Culture, Anaerobe [[id number]] Collected:  12/13/17 1448    Order Status:  Sent Specimen:  Body Fluid from Pleural Fluid  Updated:  12/13/17 1538    AFB culture (includes stain) [[id number]] Collected:  12/13/17 1448    Order Status:  Sent Specimen:  Body Fluid from Pleural Fluid Updated:  12/13/17 1537    Fungus culture [[id number]] Collected:  12/13/17 1448    Order Status:  Sent Specimen:  Body Fluid from Pleural Fluid Updated:  12/13/17 1537    Respiratory Viral Panel by PCR [facility]; Nasal Swab [[id number]] Collected:  12/11/17 2022    Order Status:  Completed Specimen:  Respiratory Updated:  12/13/17 1328     Respiratory Virus Panel, source Nasal Swab     RVP - Adenovirus Not Detected     Comment: Respiratory Viral Panel is a product of [employer].  It has been approved or cleared by the U.S. Food and Drug  Administration for in vitro diagnostic use.  Results should be  used in conjunction with clinical findings, and should not form  the sole basis for a diagnosis or treatment decision.  Negative results do not preclude respiratory virus infection  and should not be used as the sole basis for diagnosis,   treatment, or other management decisions.  Positive results do not rule out bacterial infection, or  co-infection with other viruses.  The agent detected may not  be the definitive cause of the disease. The use of additional   laboratory testing (e.g. bacterial culture, immunofluorescence,  radiography) and clinical presentation must be taken into  consideration in order to obtain the final diagnosis of   respiratory viral infection.  The RVP assay cannot adequately detect Adenovirus species C,  or serotypes 7a and 41.  The RVP primers for detection of   rhinovirus have been shown to cross-react with enterovirus.  A rhinovirus reactive result should be confirmed by an   alternative method (e.g. cell culture).  The  of the Respiratory Viral Panel has   recommmended that specimens found to be negative for  Adenovirus be confirmed by an alternative method.  The  of the Respiratory Viral Panel  has  recommended that specimens found to be negative for   Influenza be confirmed by an alternative method.          Enterovirus Not Detected     Comment: Cross-reactivity has been observed between certain Rhinovirus  strains and the Enterovirus assay.          Human Bocavirus Not Detected     Human Coronavirus Not Detected     Comment: The Human Coronavirus assay detects Human coronavirus types  229E, OC43,NL63 and HKO1.          RVP - Human Metapneumovirus (hMPV) Not Detected     RVP - Influenza A Not Detected     Influenza A - Z2N3-90 Not Detected     RVP - Influenza B Not Detected     Parainfluenza Not Detected     Respiratory Syncytial VirusVirus (RSV) A Not Detected     Comment: The Respiratory Syncytial Viral assay detects types A and B,  however it does not distinguish between the two.          RVP - Rhinovirus Not Detected     Comment: Target Enriched Mulitplex Polymerase Chain Reaction (TEM-PCR)  allows for the detection of multiple pathogens out of a single  reaction.  This test was developed and its performance   characteristics determined by [employer].  It has not   been cleared or approved by the U.S.Food and Drug Administration.  Results should be used in conjunction with clinical findings,   and should not form the sole basis for a diagnosis or treatment  decision.  TEM-PCR is a licensed technology of [employer].         Narrative:       Receiving Lab:->[facility]          I have reviewed all pertinent labs within the past 24 hours.    Estimated Creatinine Clearance: 28 mL/min (based on SCr of 3.5 mg/dL (H)).    Diagnostic Results:  I have reviewed all pertinent imaging results/findings within the past 24 hours.

## 2017-12-14 NOTE — SUBJECTIVE & OBJECTIVE
Interval History: Pt appears to be much improved after thoracentesis, denies fever or chills, nausea or vomiting, chest pain or SOB.     Review of Systems   Constitutional: Negative for chills and fever.   Respiratory: Positive for cough. Negative for chest tightness and shortness of breath.    Cardiovascular: Positive for chest pain.   Gastrointestinal: Negative for diarrhea and nausea.     Objective:     Vital Signs (Most Recent):  Temp: 98.3 °F (36.8 °C) (12/14/17 1208)  Pulse: 94 (12/14/17 1620)  Resp: 18 (12/14/17 1620)  BP: 92/61 (12/14/17 1620)  SpO2: 97 % (12/14/17 1620) Vital Signs (24h Range):  Temp:  [97.7 °F (36.5 °C)-98.3 °F (36.8 °C)] 98.3 °F (36.8 °C)  Pulse:  [] 94  Resp:  [14-18] 18  SpO2:  [93 %-97 %] 97 %  BP: ()/(44-72) 92/61     Weight: 84.6 kg (186 lb 9.9 oz)  Body mass index is 29.23 kg/m².    Estimated Creatinine Clearance: 27.2 mL/min (based on SCr of 3.6 mg/dL (H)).    Physical Exam   Constitutional: He is oriented to person, place, and time. He appears well-developed and well-nourished. No distress.   HENT:   Head: Normocephalic and atraumatic.   Mouth/Throat: Oropharynx is clear and moist. No oropharyngeal exudate.   Eyes: EOM are normal. Pupils are equal, round, and reactive to light. No scleral icterus.   Neck: No JVD present.   Cardiovascular: Normal rate and regular rhythm.  Exam reveals no gallop and no friction rub.    No murmur heard.  Pulmonary/Chest: Effort normal. No respiratory distress. He has no wheezes. He has no rales.   Decreased breath sounds on lower right chest with dullness to percussion.    Abdominal: Soft. He exhibits no distension. There is no rebound and no guarding.   Musculoskeletal: He exhibits no edema or deformity.   Lymphadenopathy:     He has no cervical adenopathy.   Neurological: He is alert and oriented to person, place, and time.   Skin: Skin is warm and dry. Capillary refill takes less than 2 seconds.   Psychiatric: He has a normal mood and  affect. His behavior is normal.       Significant Labs:   CBC:   Recent Labs  Lab 12/13/17  0348 12/14/17  0353   WBC 6.70 5.29   HGB 8.7* 8.8*   HCT 25.7* 26.4*    239     CMP:   Recent Labs  Lab 12/13/17  0348 12/13/17  1504 12/14/17  0353 12/14/17  1159   *  --  135* 132*   K 4.6  --  4.6 4.4   CL 96  --  97 94*   CO2 28  --  27 27   *  --  105 289*   BUN 47*  --  56* 58*   CREATININE 2.9*  --  3.5* 3.6*   CALCIUM 8.9  --  8.6* 8.7   PROT 6.1 6.4 5.8*  --    ALBUMIN 2.8*  --  2.6*  --    BILITOT 1.2*  --  0.7  --    ALKPHOS 220*  --  194*  --    AST 25  --  20  --    ALT 16  --  13  --    ANIONGAP 10  --  11 11   EGFRNONAA 25.2*  --  20.0* 19.4*     Microbiology Results (last 7 days)     Procedure Component Value Units Date/Time    Culture, Anaerobe [[id number]] Collected:  12/13/17 1448    Order Status:  Completed Specimen:  Body Fluid from Pleural Fluid Updated:  12/14/17 1546     Anaerobic Culture Culture in progress    AFB culture (includes stain) [[id number]] Collected:  12/13/17 1448    Order Status:  Completed Specimen:  Body Fluid from Pleural Fluid Updated:  12/14/17 1418     AFB CULTURE STAIN No acid fast bacilli seen.    Fungus culture [[id number]] Collected:  12/13/17 1448    Order Status:  Completed Specimen:  Body Fluid from Pleural Fluid Updated:  12/14/17 1403     Fungus (Mycology) Culture Culture in progress    Culture, Respiratory with Gram Stain [[id number]] Collected:  12/12/17 1101    Order Status:  Completed Specimen:  Respiratory from Sputum, Expectorated Updated:  12/14/17 0926     Respiratory Culture Normal respiratory hank     Gram Stain (Respiratory) <10 epithelial cells per low power field.     Gram Stain (Respiratory) Rare WBC's     Gram Stain (Respiratory) Few Gram negative rods     Gram Stain (Respiratory) Rare Gram positive cocci    Aerobic culture [[id number]] Collected:  12/13/17 1448    Order Status:  Completed Specimen:  Body Fluid from Pleural Fluid Updated:   12/14/17 0749     Aerobic Bacterial Culture No growth    Blood culture #1 **CANNOT BE ORDERED STAT** [[id number]] Collected:  12/11/17 1750    Order Status:  Completed Specimen:  Blood from Peripheral, Hand, Right Updated:  12/13/17 2012     Blood Culture, Routine No Growth to date     Blood Culture, Routine No Growth to date     Blood Culture, Routine No Growth to date    Blood culture #2 **CANNOT BE ORDERED STAT** [[id number]] Collected:  12/11/17 1756    Order Status:  Completed Specimen:  Blood from Peripheral, Hand, Left Updated:  12/13/17 2012     Blood Culture, Routine No Growth to date     Blood Culture, Routine No Growth to date     Blood Culture, Routine No Growth to date    Urine culture [[id number]]  (Susceptibility) Collected:  12/11/17 2022    Order Status:  Completed Specimen:  Urine from Urine, Clean Catch Updated:  12/13/17 1809     Urine Culture, Routine --     KLEBSIELLA PNEUMONIAE  >100,000 cfu/ml      KOH prep [[id number]] Collected:  12/13/17 1455    Order Status:  Completed Specimen:  Body Fluid from Pleural Fluid Updated:  12/13/17 1736     KOH Prep No yeast or fungal elements seen    Respiratory Viral Panel by PCR [facility]; Nasal Swab [[id number]] Collected:  12/11/17 2022    Order Status:  Completed Specimen:  Respiratory Updated:  12/13/17 1328     Respiratory Virus Panel, source Nasal Swab     RVP - Adenovirus Not Detected     Comment: Respiratory Viral Panel is a product of [employer].  It has been approved or cleared by the U.S. Food and Drug  Administration for in vitro diagnostic use.  Results should be  used in conjunction with clinical findings, and should not form  the sole basis for a diagnosis or treatment decision.  Negative results do not preclude respiratory virus infection  and should not be used as the sole basis for diagnosis,   treatment, or other management decisions.  Positive results do not rule out bacterial infection, or  co-infection with other viruses.  The  agent detected may not  be the definitive cause of the disease. The use of additional   laboratory testing (e.g. bacterial culture, immunofluorescence,  radiography) and clinical presentation must be taken into  consideration in order to obtain the final diagnosis of   respiratory viral infection.  The RVP assay cannot adequately detect Adenovirus species C,  or serotypes 7a and 41.  The RVP primers for detection of   rhinovirus have been shown to cross-react with enterovirus.  A rhinovirus reactive result should be confirmed by an   alternative method (e.g. cell culture).  The  of the Respiratory Viral Panel has   recommmended that specimens found to be negative for  Adenovirus be confirmed by an alternative method.  The  of the Respiratory Viral Panel has  recommended that specimens found to be negative for   Influenza be confirmed by an alternative method.          Enterovirus Not Detected     Comment: Cross-reactivity has been observed between certain Rhinovirus  strains and the Enterovirus assay.          Human Bocavirus Not Detected     Human Coronavirus Not Detected     Comment: The Human Coronavirus assay detects Human coronavirus types  229E, OC43,NL63 and HKO1.          RVP - Human Metapneumovirus (hMPV) Not Detected     RVP - Influenza A Not Detected     Influenza A - R2B8-16 Not Detected     RVP - Influenza B Not Detected     Parainfluenza Not Detected     Respiratory Syncytial VirusVirus (RSV) A Not Detected     Comment: The Respiratory Syncytial Viral assay detects types A and B,  however it does not distinguish between the two.          RVP - Rhinovirus Not Detected     Comment: Target Enriched Mulitplex Polymerase Chain Reaction (TEM-PCR)  allows for the detection of multiple pathogens out of a single  reaction.  This test was developed and its performance   characteristics determined by [employer].  It has not   been cleared or approved by the U.S.Food and Drug  Administration.  Results should be used in conjunction with clinical findings,   and should not form the sole basis for a diagnosis or treatment  decision.  TEM-PCR is a licensed technology of [employer].         Narrative:       Receiving Lab:->[facility]          Significant Imaging: I have reviewed all pertinent imaging results/findings within the past 24 hours.

## 2017-12-14 NOTE — ASSESSMENT & PLAN NOTE
- CACHORRO on CKD. Baseline creat is ~2.2 (up to 3.5 today) Highest on record. Will consult neph.  - renal dose abx and DVT prophylaxis  - monitor closely during stay

## 2017-12-14 NOTE — ASSESSMENT & PLAN NOTE
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  - monthly scheduled paracentesis. Last one three weeks ago. Consulted IR today for paracentesis today or tomorrow.  - CXR + for recollection to the R- but not significant for repeat paracentesis.  - continue demedex as current and fluid restriction.

## 2017-12-14 NOTE — NURSING
Rounds Report: Attended interdisciplinary rounds this morning with the transplant team including SW, physicians, fellows,  mid-level providers, and transplant coordinators.  Discussed plan of care, including DC date possibly Saturday. Pt with increased creatinine, reaccummulation of pleural fluid. Pt continues IV antibiotics for pneumonia. Paracentesis today, will compare fluid from thoracentesis and paracentesis.

## 2017-12-14 NOTE — H&P
"Inpatient Radiology Pre-procedure Note    History of Present Illness:    Lv Crocker is a 42 y.o. male who presents for diagnostic/therapeutic paracentesis.    Admission H&P reviewed.  Past Medical History:   Diagnosis Date    Arthritis     CAD (coronary artery disease)     H/o Coronary artery disease; resolved since heart transplant 2014    Cardiomyopathy     CHF (congestive heart failure)     Previously EF 20% (prior to heart transplant); last EF 55-60%; throught to be 2/2 genetics & DM1    Depression     Controlled "for the most part" on Lexipro    Encounter for blood transfusion     during transplant    GERD (gastroesophageal reflux disease)     Hashimoto's disease     HLD (hyperlipidemia) 6/11/2015    Hypoglycemia unawareness in type 1 diabetes mellitus     Hypothyroid     Initially hyperthyroid 2/2 Hoshimoto's thyroiditis; now on levothyroxine 150 mcg qd    Myocardial infarction     h/o MI x3 prior to heart transplant    Syncope 6/30/2015    Type I (juvenile type) diabetes mellitus with unspecified complication, uncontrolled     Controlled; Dx'd @7y/o; on N insulin 20U BID & Humalog 10U w/meals +SSI; Glu 89 11/9/17; A1c 7.2% 4/5/17    Unspecified essential hypertension 05/29/2014    Well controlled; Last /57 on 11/9/17     Past Surgical History:   Procedure Laterality Date    CARDIAC CATHETERIZATION      CARDIAC SURGERY      CHOLECYSTECTOMY      CORONARY ANGIOPLASTY      FOOT SPLIT TRANSFER OF THE POSTERIOR TIBIALIS TENDON PROCEDURE      HEART TRANSPLANT  2014    KNEE SURGERY      s/p oht  November 2014    stent placemnet         Review of Systems:   As documented in primary team H&P    Home Meds:   Prior to Admission medications    Medication Sig Start Date End Date Taking? Authorizing Provider   aspirin (ECOTRIN) 81 MG EC tablet Take 1 tablet (81 mg total) by mouth once daily. 12/11/14  Yes Chandni Fernandez MD   escitalopram oxalate (LEXAPRO) 20 MG tablet Take 1 " "tablet (20 mg total) by mouth once daily. 12/23/16  Yes Chandni Fernandez MD   fluticasone (FLONASE) 50 mcg/actuation nasal spray 2 sprays by Each Nare route once daily. 4/28/17  Yes Magalis Vázquez PA-C   gabapentin (NEURONTIN) 300 MG capsule Take 3 capsules (900 mg total) by mouth 3 (three) times daily. 5/3/17  Yes Chandni Fernandez MD   guaifenesin-codeine 100-10 mg/5 ml (TUSSI-ORGANIDIN NR)  mg/5 mL syrup Take 5 mLs by mouth 3 (three) times daily as needed. 12/4/17 12/14/17 Yes Philippe Mohr MD   insulin NPH (NOVOLIN N) 100 unit/mL injection Inject 20 Units into the skin 2 (two) times daily before meals. 11/30/17 11/30/18 Yes Kamala Jo NP   levothyroxine (SYNTHROID) 150 MCG tablet Take 1 tablet (150 mcg total) by mouth every morning. 5/31/17  Yes Monica Fernandez DNP, NP   magnesium oxide (MAG-OX) 400 mg tablet Take 1 tablet (400 mg total) by mouth once daily. 12/20/16  Yes Chandni Fernandez MD   mycophenolate (MYFORTIC) 180 MG TbEC Take 4 tablets (720 mg total) by mouth 2 (two) times daily. S/P Heart Transplant 11/18/2014 ICD-10 Z94.1 10/20/16  Yes Jose A Lloyd MD   nortriptyline (PAMELOR) 25 MG capsule Take 1 capsule (25 mg total) by mouth every evening. 4/27/17  Yes Kanika Ferreira MD   pantoprazole (PROTONIX) 40 MG tablet TAKE ONE TABLET BY MOUTH EVERY DAY 8/15/16  Yes Tim Mao NP   pen needle, diabetic 32 gauge x 5/32" Ndle Uses 5 pen needles a day 4/5/17  Yes Monica Fernandez DNP, NP   pravastatin (PRAVACHOL) 40 MG tablet Take 1 tablet (40 mg total) by mouth every evening. 4/11/17  Yes Alexandr Hernández MD   predniSONE (DELTASONE) 2.5 MG tablet Take 1 tablet (2.5 mg total) by mouth once daily. S/P Heart Transplant 11/18/2014 ICD-10 Z94.1 10/20/16  Yes Jose A Lloyd MD   tacrolimus (PROGRAF) 1 MG Cap Taked 3mg orally in the morning and 3mg orally in the evening. S/p heart transplant  11/18/2014  ICD-10 Z94.1 12/4/17  Yes Kanika Ferreira MD   tamsulosin " (FLOMAX) 0.4 mg Cp24 TAKE 2 CAPSULES(0.8 MG) BY MOUTH EVERY EVENING 9/28/17  Yes Alexandr Hernández MD   torsemide (DEMADEX) 20 MG Tab Take 2 tablets (40 mg total) by mouth once daily. 12/20/16 12/20/17 Yes Chandni Fernandez MD   albuterol 90 mcg/actuation inhaler Inhale 2 puffs into the lungs every 6 (six) hours as needed for Shortness of Breath. 12/10/17 12/10/18  Teresa Isaac NP   glucagon (human recombinant) inj 1mg/mL kit Inject 1 mL (1 mg total) into the muscle as needed. 11/30/17 11/30/18  Kamala Jo NP   lancets Misc 1 Device by Misc.(Non-Drug; Combo Route) route 4 (four) times daily with meals and nightly. 12/24/14   JOSE DANIEL Haskins   ondansetron (ZOFRAN-ODT) 4 MG TbDL Take 2 tablets (8 mg total) by mouth every 8 (eight) hours as needed (nausea). 6/21/17   Philippe Mohr MD     Scheduled Meds:    aspirin  81 mg Oral Daily    azithromycin  500 mg Intravenous Q24H    ceFEPime (MAXIPIME) IVPB  1 g Intravenous Q12H    escitalopram oxalate  20 mg Oral Daily    fluticasone  2 spray Each Nare Daily    gabapentin  900 mg Oral TID    guaiFENesin  600 mg Oral BID    heparin (porcine)  5,000 Units Subcutaneous Q8H    insulin aspart  5 Units Subcutaneous TIDWM    levothyroxine  150 mcg Oral Before breakfast    lidocaine HCL 10 mg/ml (1%)  10 mL Other Once    magnesium oxide  400 mg Oral Daily    mycophenolate  720 mg Oral BID    nortriptyline  25 mg Oral QHS    pantoprazole  40 mg Oral Daily    pravastatin  40 mg Oral QHS    predniSONE  2.5 mg Oral Daily    sodium chloride 0.9%  3 mL Intravenous Q8H    tacrolimus  1 mg Oral Once    [START ON 12/15/2017] tacrolimus  2 mg Oral BID    tamsulosin  0.4 mg Oral Daily    torsemide  40 mg Oral Daily     Continuous Infusions:   PRN Meds:albuterol, dextrose 50%, dextrose 50%, glucagon (human recombinant), glucose, glucose, insulin aspart, ondansetron, promethazine-codeine 6.25-10 mg/5 ml  Anticoagulants/Antiplatelets: aspirin and  prophylactic heparin    Allergies:   Review of patient's allergies indicates:   Allergen Reactions    No known drug allergies      Sedation Hx: have not been any systemic reactions    Labs:  No results for input(s): INR in the last 168 hours.    Invalid input(s):  PT,  PTT    Recent Labs  Lab 12/14/17  0353   WBC 5.29   HGB 8.8*   HCT 26.4*   MCV 91         Recent Labs  Lab 12/11/17  2227  12/14/17  0353 12/14/17  1159   GLU  --   < > 105 289*   NA  --   < > 135* 132*   K  --   < > 4.6 4.4   CL  --   < > 97 94*   CO2  --   < > 27 27   BUN  --   < > 56* 58*   CREATININE  --   < > 3.5* 3.6*   CALCIUM  --   < > 8.6* 8.7   MG 1.9  --   --   --    ALT  --   < > 13  --    AST  --   < > 20  --    ALBUMIN  --   < > 2.6*  --    BILITOT  --   < > 0.7  --    < > = values in this interval not displayed.      Vitals:  Temp: 98.3 °F (36.8 °C) (12/14/17 1208)  Pulse: 94 (12/14/17 1540)  Resp: 18 (12/14/17 1540)  BP: 109/72 (12/14/17 1540)  SpO2: 96 % (12/14/17 1540)     Physical Exam:  ASA: 3  Mallampati: 2    General: no acute distress  Mental Status: alert and oriented to person, place and time  HEENT: normocephalic, atraumatic  Chest: unlabored breathing  Abdomen: distended  Extremity: moves all extremities    Plan: US-guided diagnostic/therapeutic paracentesis  Sedation Plan: Local    Kp Norton M.D.  PGY-2 Radiology  Pager# [phone]

## 2017-12-14 NOTE — PROGRESS NOTES
[facility]  Infectious Disease  Progress Note    Patient Name: Lv Crocker  MRN: [id number]  Admission Date: 12/11/2017  Length of Stay: 3 days  Attending Physician: Jose A Lloyd MD  Primary Care Provider: Philippe Mohr MD    Isolation Status: No active isolations  Assessment/Plan:      Pneumonia    39-year-old male  - ICM s/p OHT 11/18/14  - AMR 2/2015 on RHC, DSA+ 3/2017 s/p IVIG, PLEX  - Recurrent ascites requiring monthly paracentesis  - Right-sided pleural effusion  - Pneumonia - patient presented with cough, CT chest with diffuse bilateral infiltrates     Recommendations:  - pt appears much improved after thoracentesis, cultures negative so far  - Continue cefepime and azithromycin while pt is here, if he is to be discharged can transition to levofloxacin. Pt will need a total of 7 days of antibiotic coverage.   - will sign off please call with any questions.             Anticipated Disposition: As per primary    Thank you for your consult. I will sign off. Please contact us if you have any additional questions.    Phan Diaz MD, PhD  Infectious Disease, PGY-4  [facility]    Subjective:     Principal Problem:Cough in adult    HPI: Pt is a 38yo male with a pmhx of ICM (s/p OHT 11/18/2014), Hashimoto's disease, DM2, cirrhosis, and CKD  AMR (dx 2/26/2015 on RHC bx; DSA + 3/27; s/p IVIG x4, PLEX x5) who was last seen by ID for CMV reactivation on 05/09/2015. At that time pt was underwent induction  with IV GCV and then was transitioned to PO Valcyte. Since that admission pt has had numerous re-admissions for heart failure exacerbations and therapeutic paracentesis. Pt states that he has had problems with ascites ever since his heart transplant.     Pt states that he was in [facility] (lives at home, able to perform all ADL's) until approximately 10 days prior when he first developed a sore throat and some post nasal drip. Pt states that this later became a  non-productive cough that over the past few days has become so severe that he has frequent coughing spells that end with some post-tussive emesis. Pt seen by his PCP who prescribed an unknown cough supressant that did not work so pt presented to an [facility] in [facility] where he was given an inhaler for a presumed URI. Pt presented to the ED this AM, states that prior to his arrival he had a fever of 100.3 measured, but also reporting frequent subjective fevers. Pt does endorse some increased AGUILAR, but denies chest pain, productive cough or hemoptysis. Pt denies smoking, EtOH or illicits. Lives with wife and  child, denies sick contacts, no recent travel. States he is UTD with immunizations as far as he knows.      Previous Positive Cultures  12/16/2014 BCx, NSI Staphylococcus epidermidis  03/18/2015 UCX  Klebsiella pneumoniae  03/18/2015 RVP  + rhinovirus  04/21/2015 UCx  K.pneumoniae, ESBL  04/22/2015 Cdiff  Ag +, Toxin -. PCR+  05/05/2015 UCx  K.pneumoniae, ESBL  10/01/2015 UCx  K.pneumoniae, ESBL  10/07/2015 Cdiff  Ag +, Toxin -. PCR+      Cultures This Admission  12/10/2017 UCx  GNR, lactose   12/11/2017 BCx, RH  NGTD  12/11/2017 BCx LH  NGTD  12/11/2017 UCx  In process    Antibiotics This Admission  Azithromycin  500 mg IV daily 12/11- present  Cefepime  1g q 12 hours 12/11-present  Vancomycin 1.25g once 12/11  Interval History: Pt appears to be much improved after thoracentesis, denies fever or chills, nausea or vomiting, chest pain or SOB.     Review of Systems   Constitutional: Negative for chills and fever.   Respiratory: Positive for cough. Negative for chest tightness and shortness of breath.    Cardiovascular: Positive for chest pain.   Gastrointestinal: Negative for diarrhea and nausea.     Objective:     Vital Signs (Most Recent):  Temp: 98.3 °F (36.8 °C) (12/14/17 1208)  Pulse: 94 (12/14/17 1620)  Resp: 18 (12/14/17 1620)  BP: 92/61 (12/14/17 1620)  SpO2: 97 % (12/14/17 1620) Vital Signs  (24h Range):  Temp:  [97.7 °F (36.5 °C)-98.3 °F (36.8 °C)] 98.3 °F (36.8 °C)  Pulse:  [] 94  Resp:  [14-18] 18  SpO2:  [93 %-97 %] 97 %  BP: ()/(44-72) 92/61     Weight: 84.6 kg (186 lb 9.9 oz)  Body mass index is 29.23 kg/m².    Estimated Creatinine Clearance: 27.2 mL/min (based on SCr of 3.6 mg/dL (H)).    Physical Exam   Constitutional: He is oriented to person, place, and time. He appears well-developed and well-nourished. No distress.   HENT:   Head: Normocephalic and atraumatic.   Mouth/Throat: Oropharynx is clear and moist. No oropharyngeal exudate.   Eyes: EOM are normal. Pupils are equal, round, and reactive to light. No scleral icterus.   Neck: No JVD present.   Cardiovascular: Normal rate and regular rhythm.  Exam reveals no gallop and no friction rub.    No murmur heard.  Pulmonary/Chest: Effort normal. No respiratory distress. He has no wheezes. He has no rales.   Decreased breath sounds on lower right chest with dullness to percussion.    Abdominal: Soft. He exhibits no distension. There is no rebound and no guarding.   Musculoskeletal: He exhibits no edema or deformity.   Lymphadenopathy:     He has no cervical adenopathy.   Neurological: He is alert and oriented to person, place, and time.   Skin: Skin is warm and dry. Capillary refill takes less than 2 seconds.   Psychiatric: He has a normal mood and affect. His behavior is normal.       Significant Labs:   CBC:   Recent Labs  Lab 12/13/17  0348 12/14/17  0353   WBC 6.70 5.29   HGB 8.7* 8.8*   HCT 25.7* 26.4*    239     CMP:   Recent Labs  Lab 12/13/17  0348 12/13/17  1504 12/14/17  0353 12/14/17  1159   *  --  135* 132*   K 4.6  --  4.6 4.4   CL 96  --  97 94*   CO2 28  --  27 27   *  --  105 289*   BUN 47*  --  56* 58*   CREATININE 2.9*  --  3.5* 3.6*   CALCIUM 8.9  --  8.6* 8.7   PROT 6.1 6.4 5.8*  --    ALBUMIN 2.8*  --  2.6*  --    BILITOT 1.2*  --  0.7  --    ALKPHOS 220*  --  194*  --    AST 25  --  20  --     ALT 16  --  13  --    ANIONGAP 10  --  11 11   EGFRNONAA 25.2*  --  20.0* 19.4*     Microbiology Results (last 7 days)     Procedure Component Value Units Date/Time    Culture, Anaerobe [[id number]] Collected:  12/13/17 1448    Order Status:  Completed Specimen:  Body Fluid from Pleural Fluid Updated:  12/14/17 1546     Anaerobic Culture Culture in progress    AFB culture (includes stain) [[id number]] Collected:  12/13/17 1448    Order Status:  Completed Specimen:  Body Fluid from Pleural Fluid Updated:  12/14/17 1418     AFB CULTURE STAIN No acid fast bacilli seen.    Fungus culture [[id number]] Collected:  12/13/17 1448    Order Status:  Completed Specimen:  Body Fluid from Pleural Fluid Updated:  12/14/17 1403     Fungus (Mycology) Culture Culture in progress    Culture, Respiratory with Gram Stain [[id number]] Collected:  12/12/17 1101    Order Status:  Completed Specimen:  Respiratory from Sputum, Expectorated Updated:  12/14/17 0926     Respiratory Culture Normal respiratory hank     Gram Stain (Respiratory) <10 epithelial cells per low power field.     Gram Stain (Respiratory) Rare WBC's     Gram Stain (Respiratory) Few Gram negative rods     Gram Stain (Respiratory) Rare Gram positive cocci    Aerobic culture [[id number]] Collected:  12/13/17 1448    Order Status:  Completed Specimen:  Body Fluid from Pleural Fluid Updated:  12/14/17 0749     Aerobic Bacterial Culture No growth    Blood culture #1 **CANNOT BE ORDERED STAT** [[id number]] Collected:  12/11/17 1750    Order Status:  Completed Specimen:  Blood from Peripheral, Hand, Right Updated:  12/13/17 2012     Blood Culture, Routine No Growth to date     Blood Culture, Routine No Growth to date     Blood Culture, Routine No Growth to date    Blood culture #2 **CANNOT BE ORDERED STAT** [[id number]] Collected:  12/11/17 1756    Order Status:  Completed Specimen:  Blood from Peripheral, Hand, Left Updated:  12/13/17 2012     Blood Culture, Routine No Growth to  date     Blood Culture, Routine No Growth to date     Blood Culture, Routine No Growth to date    Urine culture [[id number]]  (Susceptibility) Collected:  12/11/17 2022    Order Status:  Completed Specimen:  Urine from Urine, Clean Catch Updated:  12/13/17 1809     Urine Culture, Routine --     KLEBSIELLA PNEUMONIAE  >100,000 cfu/ml      KOH prep [[id number]] Collected:  12/13/17 1455    Order Status:  Completed Specimen:  Body Fluid from Pleural Fluid Updated:  12/13/17 1736     KOH Prep No yeast or fungal elements seen    Respiratory Viral Panel by PCR [facility]; Nasal Swab [[id number]] Collected:  12/11/17 2022    Order Status:  Completed Specimen:  Respiratory Updated:  12/13/17 1328     Respiratory Virus Panel, source Nasal Swab     RVP - Adenovirus Not Detected     Comment: Respiratory Viral Panel is a product of [employer] ([employer]).  It has been approved or cleared by the U.S. Food and Drug  Administration for in vitro diagnostic use.  Results should be  used in conjunction with clinical findings, and should not form  the sole basis for a diagnosis or treatment decision.  Negative results do not preclude respiratory virus infection  and should not be used as the sole basis for diagnosis,   treatment, or other management decisions.  Positive results do not rule out bacterial infection, or  co-infection with other viruses.  The agent detected may not  be the definitive cause of the disease. The use of additional   laboratory testing (e.g. bacterial culture, immunofluorescence,  radiography) and clinical presentation must be taken into  consideration in order to obtain the final diagnosis of   respiratory viral infection.  The RVP assay cannot adequately detect Adenovirus species C,  or serotypes 7a and 41.  The RVP primers for detection of   rhinovirus have been shown to cross-react with enterovirus.  A rhinovirus reactive result should be confirmed by an   alternative method (e.g. cell culture).  The   of the Respiratory Viral Panel has   recommmended that specimens found to be negative for  Adenovirus be confirmed by an alternative method.  The  of the Respiratory Viral Panel has  recommended that specimens found to be negative for   Influenza be confirmed by an alternative method.          Enterovirus Not Detected     Comment: Cross-reactivity has been observed between certain Rhinovirus  strains and the Enterovirus assay.          Human Bocavirus Not Detected     Human Coronavirus Not Detected     Comment: The Human Coronavirus assay detects Human coronavirus types  229E, OC43,NL63 and HKO1.          RVP - Human Metapneumovirus (hMPV) Not Detected     RVP - Influenza A Not Detected     Influenza A - [id number] Not Detected     RVP - Influenza B Not Detected     Parainfluenza Not Detected     Respiratory Syncytial VirusVirus (RSV) A Not Detected     Comment: The Respiratory Syncytial Viral assay detects types A and B,  however it does not distinguish between the two.          RVP - Rhinovirus Not Detected     Comment: Target Enriched Mulitplex Polymerase Chain Reaction (TEM-PCR)  allows for the detection of multiple pathogens out of a single  reaction.  This test was developed and its performance   characteristics determined by [employer].  It has not   been cleared or approved by the U.S.Food and Drug Administration.  Results should be used in conjunction with clinical findings,   and should not form the sole basis for a diagnosis or treatment  decision.  TEM-PCR is a licensed technology of [employer].         Narrative:       Receiving Lab:->[facility]          Significant Imaging: I have reviewed all pertinent imaging results/findings within the past 24 hours.

## 2017-12-14 NOTE — PROCEDURES
Radiology Post-Procedure Note    Pre Op Diagnosis: Ascites  Post Op Diagnosis: Same    Procedure: Paracentesis    Procedure performed by: Preston East MD and Kp Norton MD    Following informed consent, the patient underwent sterile preparation and 1% lidocaine for local anesthesia. Ultrasound guidance was used to insert a 5-Sinhala Yueh catheter into the peritoneal space. 2.6L of opaque, dark, yellow-colored fluid was successfully removed with no complications.    Written Informed Consent Obtained: Yes  Specimen Removed: NO  Estimated Blood Loss: Minimal    Findings:   Successful paracentesis.  Patient tolerated procedure well.    Kp Norton M.D.  PGY-2 Radiology  Pager# [phone]

## 2017-12-14 NOTE — PROGRESS NOTES
Pt arrived to IR room 125 for para, no acute distress noted. Orders and labs reviewed on chart. Awaiting consent.

## 2017-12-14 NOTE — PROGRESS NOTES
[facility]  Infectious Disease  Progress Note    Patient Name: Lv Crocker  MRN: [id number]  Admission Date: 12/11/2017  Length of Stay: 2 days  Attending Physician: Jose A Lloyd MD  Primary Care Provider: Philippe Mohr MD    Isolation Status: No active isolations  Assessment/Plan:      Pneumonia    39-year-old male  - ICM s/p OHT 11/18/14  - AMR 2/2015 on RHC, DSA+ 3/2017 s/p IVIG, PLEX  - Recurrent ascites requiring monthly paracentesis  - Right-sided pleural effusion  - Pneumonia - patient presented with cough, CT chest with diffuse bilateral infiltrates     Recommendations:  - Continue cefepime, azithromycin  - Follow-up thora studies, cultures  - If no symptomatic improvement with thoracentesis and empiric course of antibiotics, would pursue bronchoscopy            Anticipated Disposition: clinical improvement    Thank you for your consult. I will follow-up with patient. Please contact us if you have any additional questions.    Zoya Alexis MD  Infectious Disease  [facility]    Subjective:     Principal Problem:Cough in adult    HPI: Pt is a 38yo male with a pmhx of ICM (s/p OHT 11/18/2014), Hashimoto's disease, DM2, cirrhosis, and CKD  AMR (dx 2/26/2015 on RHC bx; DSA + 3/27; s/p IVIG x4, PLEX x5) who was last seen by ID for CMV reactivation on 05/09/2015. At that time pt was underwent induction  with IV GCV and then was transitioned to PO Valcyte. Since that admission pt has had numerous re-admissions for heart failure exacerbations and therapeutic paracentesis. Pt states that he has had problems with ascites ever since his heart transplant.     Pt states that he was in [place] (lives at home, able to perform all ADL's) until approximately 10 days prior when he first developed a sore throat and some post nasal drip. Pt states that this later became a non-productive cough that over the past few days has become so severe that he has frequent coughing spells  that end with some post-tussive emesis. Pt seen by his PCP who prescribed an unknown cough supressant that did not work so pt presented to an [facility] in [facility] where he was given an inhaler for a presumed URI. Pt presented to the ED this AM, states that prior to his arrival he had a fever of 100.3 measured, but also reporting frequent subjective fevers. Pt does endorse some increased AGUILAR, but denies chest pain, productive cough or hemoptysis. Pt denies smoking, EtOH or illicits. Lives with wife and  child, denies sick contacts, no recent travel. States he is UTD with immunizations as far as he knows.      Previous Positive Cultures  12/16/2014 BCx, NSI Staphylococcus epidermidis  03/18/2015 UCX  Klebsiella pneumoniae  03/18/2015 RVP  + rhinovirus  04/21/2015 UCx  K.pneumoniae, ESBL  04/22/2015 Cdiff  Ag +, Toxin -. PCR+  05/05/2015 UCx  K.pneumoniae, ESBL  10/01/2015 UCx  K.pneumoniae, ESBL  10/07/2015 Cdiff  Ag +, Toxin -. PCR+      Cultures This Admission  12/10/2017 UCx  GNR, lactose   12/11/2017 BCx, RH  NGTD  12/11/2017 BCx LH  NGTD  12/11/2017 UCx  In process    Antibiotics This Admission  Azithromycin  500 mg IV daily 12/11- present  Cefepime  1g q 12 hours 12/11-present  Vancomycin 1.25g once 12/11  Interval History:   No fevers overnight  Patient continues to have cough, no SOB, CP    Review of Systems   Constitutional: Negative for chills, diaphoresis and fever.   HENT: Negative for rhinorrhea and sore throat.    Respiratory: Positive for cough. Negative for shortness of breath.    Cardiovascular: Negative for chest pain and leg swelling.   Gastrointestinal: Negative for abdominal pain, diarrhea, nausea and vomiting.   Genitourinary: Negative for dysuria and hematuria.   Musculoskeletal: Negative for arthralgias and myalgias.   Skin: Negative for rash.   Neurological: Negative for headaches.     Objective:     Vital Signs (Most Recent):  Temp: 98.5 °F (36.9 °C) (12/13/17 1619)  Pulse: 94  (12/13/17 1619)  Resp: 16 (12/13/17 1619)  BP: (!) 97/49 (12/13/17 1619)  SpO2: 97 % (12/13/17 1619) Vital Signs (24h Range):  Temp:  [98.5 °F (36.9 °C)-99.3 °F (37.4 °C)] 98.5 °F (36.9 °C)  Pulse:  [] 94  Resp:  [16-18] 16  SpO2:  [89 %-97 %] 97 %  BP: ()/(42-67) 97/49     Weight: 84.6 kg (186 lb 9.9 oz)  Body mass index is 29.23 kg/m².    Estimated Creatinine Clearance: 33.8 mL/min (based on SCr of 2.9 mg/dL (H)).    Physical Exam   Constitutional: He is oriented to person, place, and time. He appears well-developed and well-nourished. No distress.   HENT:   Head: Normocephalic and atraumatic.   Eyes: Conjunctivae and EOM are normal.   Neck: Normal range of motion. Neck supple.   Cardiovascular: Normal rate.    Pulmonary/Chest: Effort normal. No respiratory distress.   Abdominal: Soft. He exhibits no distension.   Musculoskeletal: Normal range of motion. He exhibits no edema.   Neurological: He is alert and oriented to person, place, and time.   Skin: Skin is warm and dry. No rash noted. He is not diaphoretic. No erythema.   Psychiatric: He has a normal mood and affect. His behavior is normal.       Significant Labs: All pertinent labs within the past 24 hours have been reviewed.    Significant Imaging: I have reviewed all pertinent imaging results/findings within the past 24 hours.

## 2017-12-14 NOTE — PROGRESS NOTES
Paracentesis completed, pt tolerated well. No apparent distress noted. 2.6 Liters removed from left abd, mepore applied CDI. No labs ordered. Report called to [facility]. Pt awaiting transport.

## 2017-12-14 NOTE — ASSESSMENT & PLAN NOTE
- S/P OHTx 11/8/14 (high risk)   - history of AMR treated with PP and IVIG 4/2016   - Biopsy 9/12/16 negative for rejection   - [facility] 2/10/16 no CAV  - Prograf recently increased to 3/3, continue pred 2.5 and Myfortic 720 BID  - Prograf goal 6-8, 11 today so will drop to 2/2 and only give 1 tonight.   - per patient his Cr bumps historically when his TAC is high. Consulted Nephrology  - Repeat echo w/ preserved EF. Patient has known (chronic) restriction since transplant

## 2017-12-14 NOTE — PROGRESS NOTES
[facility]  Pulmonology  Progress Note    Patient Name: Lv Crocker  MRN: [id number]  Admission Date: 12/11/2017  Hospital Length of Stay: 3 days  Code Status: Full Code  Attending Provider: Jose A Lloyd MD  Primary Care Provider: Philippe Mohr MD   Principal Problem: Cough in adult    Subjective:     Interval History: No acute events overnight. Patient feels like his SOB has improved. He continues to cough. 1350 cc of pleural fluid was removed yesterday. CXR (-) for pneumothorax.    Objective:     Vital Signs (Most Recent):  Temp: 98.3 °F (36.8 °C) (12/14/17 1208)  Pulse: 102 (12/14/17 1208)  Resp: 17 (12/14/17 1208)  BP: (!) 101/55 (12/14/17 1208)  SpO2: (!) 94 % (12/14/17 1208) Vital Signs (24h Range):  Temp:  [97.7 °F (36.5 °C)-98.5 °F (36.9 °C)] 98.3 °F (36.8 °C)  Pulse:  [] 102  Resp:  [14-18] 17  SpO2:  [93 %-97 %] 94 %  BP: ()/(44-58) 101/55     Weight: 84.6 kg (186 lb 9.9 oz)  Body mass index is 29.23 kg/m².    Intake/Output Summary (Last 24 hours) at 12/14/17 1238  Last data filed at 12/14/17 0900   Gross per 24 hour   Intake              740 ml   Output                0 ml   Net              740 ml     Physical Exam   Constitutional: He is oriented to person, place, and time. He appears well-developed and well-nourished. No distress.   HENT:   Head: Normocephalic and atraumatic.   Mouth/Throat: Oropharynx is clear and moist. No oropharyngeal exudate.   Eyes: Conjunctivae and EOM are normal. Pupils are equal, round, and reactive to light. No scleral icterus.   Neck: Normal range of motion. Neck supple.   Cardiovascular: Normal rate, regular rhythm, normal heart sounds and intact distal pulses.    No murmur heard.  Pulmonary/Chest: Effort normal. No respiratory distress. He has no wheezes. He has no rales. He exhibits no tenderness.   Decreased breath sounds R base.   Abdominal: Soft. Bowel sounds are normal. He exhibits distension. There is no tenderness.    Musculoskeletal: Normal range of motion. He exhibits no edema.   Lymphadenopathy:     He has no cervical adenopathy.   Neurological: He is alert and oriented to person, place, and time.   Skin: Skin is warm and dry. He is not diaphoretic.   Psychiatric: He has a normal mood and affect. Judgment normal.   Nursing note and vitals reviewed.    Lines/Drains/Airways     Peripheral Intravenous Line                 Peripheral IV - Single Lumen 12/11/17 1750 Right Hand 2 days              Significant Labs:    CBC/Anemia Profile:    Recent Labs  Lab 12/13/17 0348 12/14/17  0353   WBC 6.70 5.29   HGB 8.7* 8.8*   HCT 25.7* 26.4*    239   MCV 92 91   RDW 12.1 12.2     Chemistries:    Recent Labs  Lab 12/13/17 0348 12/13/17  1504 12/14/17  0353 12/14/17  1159   *  --  135* 132*   K 4.6  --  4.6 4.4   CL 96  --  97 94*   CO2 28  --  27 27   BUN 47*  --  56* 58*   CREATININE 2.9*  --  3.5* 3.6*   CALCIUM 8.9  --  8.6* 8.7   ALBUMIN 2.8*  --  2.6*  --    PROT 6.1 6.4 5.8*  --    BILITOT 1.2*  --  0.7  --    ALKPHOS 220*  --  194*  --    ALT 16  --  13  --    AST 25  --  20  --      All pertinent labs within the past 24 hours have been reviewed.    Significant Imaging:  I have reviewed and interpreted all pertinent imaging results/findings within the past 24 hours.    Assessment/Plan:     Pneumonia    This is a case of cough/URI symptoms in the setting of immunosuppression for previous transplant and abnormal CT chest findings. CT chest shows bilateral consolidative opacities likely representing a pneumonic process. Patient is currently on broad-spectrum ABX, Vanc/Cefepime/Azithromycin, after cultures have been collected, as well as Tamiflu. Patient continues to be afebrile while in the hospital. Would continue Abx.    There was some concern regarding the R pleural effusion - thoracentesis was done yesterday with the removal of 1350 cc of cloudy yellow fluid. The effusion has been unchanged over the last few scans.  Fluid is exudative (LDH ratio .75) with WBC count of 798 predominantly lymphocytic (74%). This is nonspecific at this time and likely not the cause of acute infection. Waiting on cholesterol level to see if this could be a pseudochylous effusion. The pleural process could be connected to the recurrent ascites - I believe patient is receiving paracentesis during this admission. Can compare the counts in the pleural fluid to the peritoneal fluid at that time.          Thank you for involving us in the care of this patient. We will sign off at this time. Please call us with any questions.    Abbie Coleman MD  [facility] PCCM Fellow, PGY 4  [facility]  Pager: [phone]

## 2017-12-14 NOTE — CONSULTS
Reviewed chart and imaging. Will plan for diagnostic/therapeutic paracentesis in near future. Please continue to hold any unnecessary anticoagulation and place any desired orders for laboratory analysis of ascites. Will place procedure order now.    Kp Norton M.D.  PGY-2 Radiology  Pager# [phone]

## 2017-12-14 NOTE — PLAN OF CARE
Problem: Patient Care Overview  Goal: Plan of Care Review  Patient is A&Ox3, wife is at bedside, IV is intact, no redness or swelling noted, flushed; patient had an episode with his BP going low 80/44, doctor was notified, no actions were take, patient is waiting for an xray of his chest to be completed. Patient stated he didn't cough as much last night; BS level for HS was 400, doctor notified and stated to just give Sliding Scale and Detemir, this nurse rechecked the BS at 0000 and it 257.

## 2017-12-14 NOTE — ASSESSMENT & PLAN NOTE
39-year-old male  - ICM s/p OHT 11/18/14  - AMR 2/2015 on RHC, DSA+ 3/2017 s/p IVIG, PLEX  - Recurrent ascites requiring monthly paracentesis  - Right-sided pleural effusion  - Pneumonia - patient presented with cough, CT chest with diffuse bilateral infiltrates     Recommendations:  - pt appears much improved after thoracentesis, cultures negative so far  - Continue cefepime and azithromycin while pt is here, if he is to be discharged can transition to levofloxacin. Pt will need a total of 10 days of antibiotic coverage.   - will sign off please call with any questions.

## 2017-12-14 NOTE — SUBJECTIVE & OBJECTIVE
"Past Medical History:   Diagnosis Date    Arthritis     CAD (coronary artery disease)     H/o Coronary artery disease; resolved since heart transplant 2014    Cardiomyopathy     CHF (congestive heart failure)     Previously EF 20% (prior to heart transplant); last EF 55-60%; throught to be 2/2 genetics & DM1    Depression     Controlled "for the most part" on Lexipro    Encounter for blood transfusion     during transplant    GERD (gastroesophageal reflux disease)     Hashimoto's disease     HLD (hyperlipidemia) 6/11/2015    Hypoglycemia unawareness in type 1 diabetes mellitus     Hypothyroid     Initially hyperthyroid 2/2 Hoshimoto's thyroiditis; now on levothyroxine 150 mcg qd    Myocardial infarction     h/o MI x3 prior to heart transplant    Syncope 6/30/2015    Type I (juvenile type) diabetes mellitus with unspecified complication, uncontrolled     Controlled; Dx'd @7y/o; on N insulin 20U BID & Humalog 10U w/meals +SSI; Glu 89 11/9/17; A1c 7.2% 4/5/17    Unspecified essential hypertension 05/29/2014    Well controlled; Last /57 on 11/9/17       Past Surgical History:   Procedure Laterality Date    CARDIAC CATHETERIZATION      CARDIAC SURGERY      CHOLECYSTECTOMY      CORONARY ANGIOPLASTY      FOOT SPLIT TRANSFER OF THE POSTERIOR TIBIALIS TENDON PROCEDURE      HEART TRANSPLANT  2014    KNEE SURGERY      s/p oht  November 2014    stent placemnet         Review of patient's allergies indicates:   Allergen Reactions    No known drug allergies      Current Facility-Administered Medications   Medication Frequency    albuterol inhaler 2 puff Q6H PRN    aspirin EC tablet 81 mg Daily    azithromycin 500 mg in dextrose 5 % 250 mL IVPB (ready to mix system) Q24H    cefepime in dextrose 5 % 1 gram/50 mL IVPB 1 g Q12H    dextrose 50% injection 12.5 g PRN    dextrose 50% injection 25 g PRN    escitalopram oxalate tablet 20 mg Daily    fluticasone 50 mcg/actuation nasal spray 2 spray " Daily    gabapentin capsule 900 mg TID    glucagon (human recombinant) injection 1 mg PRN    glucose chewable tablet 16 g PRN    glucose chewable tablet 24 g PRN    guaiFENesin 12 hr tablet 600 mg BID    heparin (porcine) injection 5,000 Units Q8H    insulin aspart pen 0-5 Units QID (AC + HS) PRN    insulin aspart pen 5 Units TIDWM    levothyroxine tablet 150 mcg Before breakfast    lidocaine HCL 10 mg/ml (1%) injection 10 mL Once    magnesium oxide tablet 400 mg Daily    mycophenolate EC tablet 720 mg BID    nortriptyline capsule 25 mg QHS    ondansetron disintegrating tablet 8 mg Q8H PRN    pantoprazole EC tablet 40 mg Daily    pravastatin tablet 40 mg QHS    predniSONE tablet 2.5 mg Daily    promethazine-codeine 6.25-10 mg/5 ml syrup 5 mL Q6H PRN    sodium chloride 0.9% flush 3 mL Q8H    tacrolimus capsule 1 mg Once    [START ON 12/15/2017] tacrolimus capsule 2 mg BID    tamsulosin 24 hr capsule 0.4 mg Daily    torsemide tablet 40 mg Daily     Family History     Problem Relation (Age of Onset)    Cancer Brother (50)    Diabetes Mother, Father, Brother, Son, Sister, Maternal Uncle, Paternal Aunt, Maternal Grandmother, Maternal Grandfather    Heart disease Mother, Brother    Hypertension Mother, Father, Brother    Kidney disease Mother, Father    Stroke Father, Brother    Vision loss Brother        Social History Main Topics    Smoking status: Never Smoker    Smokeless tobacco: Never Used    Alcohol use No    Drug use: No    Sexual activity: Yes     Partners: Female     Review of Systems   Constitutional: Positive for activity change. Negative for appetite change, chills, diaphoresis and fever.   HENT: Negative for rhinorrhea, sinus pressure and sore throat.    Respiratory: Positive for cough. Negative for shortness of breath and wheezing.    Cardiovascular: Negative for chest pain and leg swelling.   Gastrointestinal: Positive for abdominal distention. Negative for abdominal pain,  constipation, diarrhea, nausea and vomiting.   Genitourinary: Negative for decreased urine volume, difficulty urinating, dysuria, frequency and hematuria.   Musculoskeletal: Negative for arthralgias and myalgias.   Skin: Negative for color change and rash.   Neurological: Negative for dizziness, syncope and headaches.   Psychiatric/Behavioral: Negative for agitation, behavioral problems and confusion.     Objective:     Vital Signs (Most Recent):  Temp: 98.3 °F (36.8 °C) (12/14/17 1208)  Pulse: 102 (12/14/17 1208)  Resp: 17 (12/14/17 1208)  BP: (!) 101/55 (12/14/17 1208)  SpO2: (!) 94 % (12/14/17 1208)  O2 Device (Oxygen Therapy): room air (12/14/17 1208) Vital Signs (24h Range):  Temp:  [97.7 °F (36.5 °C)-98.5 °F (36.9 °C)] 98.3 °F (36.8 °C)  Pulse:  [] 102  Resp:  [14-18] 17  SpO2:  [93 %-97 %] 94 %  BP: ()/(44-58) 101/55     Weight: 84.6 kg (186 lb 9.9 oz) (12/13/17 0558)  Body mass index is 29.23 kg/m².  Body surface area is 2 meters squared.    I/O last 3 completed shifts:  In: 790 [P.O.:340; IV Piggyback:450]  Out: 1000 [Urine:1000]    Physical Exam   Constitutional: He is oriented to person, place, and time. He appears well-developed and well-nourished. No distress.   HENT:   Head: Normocephalic and atraumatic.   Right Ear: External ear normal.   Left Ear: External ear normal.   Eyes: Conjunctivae and EOM are normal. Right eye exhibits no discharge. Left eye exhibits no discharge.   Neck: Normal range of motion. Neck supple. No JVD present.   Cardiovascular: Normal heart sounds and intact distal pulses.  Exam reveals no gallop and no friction rub.    No murmur heard.  Pulmonary/Chest: Effort normal. No respiratory distress. He has no wheezes. He has rales (R lung fields).   Abdominal: Soft. Bowel sounds are normal. He exhibits distension. There is no tenderness.   Musculoskeletal: Normal range of motion. He exhibits no edema or deformity.   Neurological: He is alert and oriented to person, place,  and time.   Skin: Skin is warm and dry. No rash noted. He is not diaphoretic. No erythema.   Psychiatric: He has a normal mood and affect. His behavior is normal.       Significant Labs:  CBC:   Recent Labs  Lab 12/14/17 0353   WBC 5.29   RBC 2.90*   HGB 8.8*   HCT 26.4*      MCV 91   MCH 30.3   MCHC 33.3     CMP:   Recent Labs  Lab 12/14/17 0353 12/14/17  1159    289*   CALCIUM 8.6* 8.7   ALBUMIN 2.6*  --    PROT 5.8*  --    * 132*   K 4.6 4.4   CO2 27 27   CL 97 94*   BUN 56* 58*   CREATININE 3.5* 3.6*   ALKPHOS 194*  --    ALT 13  --    AST 20  --    BILITOT 0.7  --

## 2017-12-14 NOTE — HPI
"Mr. Crocker is a 41 yo male with PMHx of CAD, Depression, DM, GERD, Hypothyroidism, HLD, s/p OHTx on 11/14 (complicated by AMR requiring intensive immunosuppressant therapy), and CKD III (baseline ~ 2.4) who presented to the hospital on 12/11 with chief complaint of dry cough, pleurisy and fever with TMax of 103 x 3-4 days.  CXR upon admission with patchy opacities bilaterally and treatment was started for pneumonia.  He denied any N/V/D and reports adequate PO intake prior to admission as well as compliance with outpatient medication regimen.  Denies use of any OTC medications.  Normally followed by his outpatient Nephrologist in [place] and currently prescribed Torsemide 40 mg QD for HF.  Recent ECHO completed on 12/12 revealed a normal EF with no diastolic dysfunction and right arterial pressure ~ 8 with pHTN.  He reports adequate UOP, but has noticed lately that the color has now become darker in color.  He denies any dysuria/pyuria/ or foamy urine.  Kidney function upon admission at 2.4 with a rise in SCr to 2.9 on the 13th with another rise on the 14th to 3.5. Noted that on the 11th patients HR increased to 128 with BP reading of 90/50 along with another episode of hypotension on the 14th with a decrease in BP to 80/44 and increase in HR to 137.  He also is currently being treated for a UTI (Klebsiella Pneumoniae).    He underwent a thoracentesis on 12/14 with 1.3L of fluid removed, reported as being "cloudy", with cultures still pending at time of consultation.  He usually requires monthly scheduled paracentesis with his last one at the end of November, and reports they had around 5L removed at that time. Currently he has ascites and feels that is "almost time" to have another para.    "

## 2017-12-14 NOTE — ASSESSMENT & PLAN NOTE
This is a case of cough/URI symptoms in the setting of immunosuppression for previous transplant and abnormal CT chest findings. CT chest shows bilateral consolidative opacities likely representing a pneumonic process. Patient is currently on broad-spectrum ABX, Vanc/Cefepime/Azithromycin, after cultures have been collected, as well as Tamiflu. Patient continues to be afebrile while in the hospital. Would continue Abx.    There was some concern regarding the R pleural effusion - thoracentesis was done yesterday with the removal of 1350 cc of cloudy yellow fluid. The effusion has been unchanged over the last few scans. Fluid is exudative (LDH ratio .75) with WBC count of 798 predominantly lymphocytic (74%). This is nonspecific at this time and likely not the cause of acute infection. Waiting on cholesterol level to see if this could be a pseudochylous effusion. The pleural process could be connected to the recurrent ascites - I believe patient is receiving paracentesis during this admission. Can compare the counts in the pleural fluid to the peritoneal fluid at that time.

## 2017-12-14 NOTE — ASSESSMENT & PLAN NOTE
39-year-old male  - ICM s/p OHT 11/18/14  - AMR 2/2015 on RHC, DSA+ 3/2017 s/p IVIG, PLEX  - Recurrent ascites requiring monthly paracentesis  - Right-sided pleural effusion  - Pneumonia - patient presented with cough, CT chest with diffuse bilateral infiltrates     Recommendations:  - pt appears much improved after thoracentesis, cultures negative so far  - Continue cefepime and azithromycin while pt is here, if he is to be discharged can transition to levofloxacin. Pt will need a total of 7 days of antibiotic coverage.   - will sign off please call with any questions.

## 2017-12-14 NOTE — ASSESSMENT & PLAN NOTE
39-year-old male  - ICM s/p OHT 11/18/14  - AMR 2/2015 on RHC, DSA+ 3/2017 s/p IVIG, PLEX  - Recurrent ascites requiring monthly paracentesis  - Right-sided pleural effusion  - Pneumonia - patient presented with cough, CT chest with diffuse bilateral infiltrates     Recommendations:  - Continue cefepime, azithromycin  - Follow-up thora studies, cultures  - If no symptomatic improvement with thoracentesis and empiric course of antibiotics, would pursue bronchoscopy

## 2017-12-14 NOTE — SUBJECTIVE & OBJECTIVE
Interval History:   No fevers overnight  Patient continues to have cough, no SOB, CP    Review of Systems   Constitutional: Negative for chills, diaphoresis and fever.   HENT: Negative for rhinorrhea and sore throat.    Respiratory: Positive for cough. Negative for shortness of breath.    Cardiovascular: Negative for chest pain and leg swelling.   Gastrointestinal: Negative for abdominal pain, diarrhea, nausea and vomiting.   Genitourinary: Negative for dysuria and hematuria.   Musculoskeletal: Negative for arthralgias and myalgias.   Skin: Negative for rash.   Neurological: Negative for headaches.     Objective:     Vital Signs (Most Recent):  Temp: 98.5 °F (36.9 °C) (12/13/17 1619)  Pulse: 94 (12/13/17 1619)  Resp: 16 (12/13/17 1619)  BP: (!) 97/49 (12/13/17 1619)  SpO2: 97 % (12/13/17 1619) Vital Signs (24h Range):  Temp:  [98.5 °F (36.9 °C)-99.3 °F (37.4 °C)] 98.5 °F (36.9 °C)  Pulse:  [] 94  Resp:  [16-18] 16  SpO2:  [89 %-97 %] 97 %  BP: ()/(42-67) 97/49     Weight: 84.6 kg (186 lb 9.9 oz)  Body mass index is 29.23 kg/m².    Estimated Creatinine Clearance: 33.8 mL/min (based on SCr of 2.9 mg/dL (H)).    Physical Exam   Constitutional: He is oriented to person, place, and time. He appears well-developed and well-nourished. No distress.   HENT:   Head: Normocephalic and atraumatic.   Eyes: Conjunctivae and EOM are normal.   Neck: Normal range of motion. Neck supple.   Cardiovascular: Normal rate.    Pulmonary/Chest: Effort normal. No respiratory distress.   Abdominal: Soft. He exhibits no distension.   Musculoskeletal: Normal range of motion. He exhibits no edema.   Neurological: He is alert and oriented to person, place, and time.   Skin: Skin is warm and dry. No rash noted. He is not diaphoretic. No erythema.   Psychiatric: He has a normal mood and affect. His behavior is normal.       Significant Labs: All pertinent labs within the past 24 hours have been reviewed.    Significant Imaging: I have  reviewed all pertinent imaging results/findings within the past 24 hours.

## 2017-12-14 NOTE — CONSULTS
"[facility]  Nephrology  Consult Note    Patient Name: Lv Crocker  MRN: [id number]  Admission Date: 12/11/2017  Hospital Length of Stay: 3 days  Attending Provider: Jose A Lloyd MD   Primary Care Physician: Philippe Mohr MD  Principal Problem:Cough in adult    Inpatient consult to Nephrology  Consult performed by: TIFFANIE MADDOX  Consult ordered by: CORTNEY LATIF  Reason for consult: CACHORRO on CKD        Subjective:     HPI: Mr. Crocker is a 43 yo male with PMHx of CAD, Depression, DM, GERD, Hypothyroidism, HLD, s/p OHTx on 11/14 (complicated by [facility] requiring intensive immunosuppressant therapy), and CKD III (baseline ~ 2.4) who presented to the hospital on 12/11 with chief complaint of dry cough, pleurisy and fever with TMax of 103 x 3-4 days.  CXR upon admission with patchy opacities bilaterally and treatment was started for pneumonia.  He denied any N/V/D and reports adequate PO intake prior to admission as well as compliance with outpatient medication regimen.  Denies use of any OTC medications.  Normally followed by his outpatient Nephrologist in [place] and currently prescribed Torsemide 40 mg QD for HF.  Recent ECHO completed on 12/12 revealed a normal EF with no diastolic dysfunction and right arterial pressure ~ 8 with pHTN.  He reports adequate UOP, but has noticed lately that the color has now become darker in color.  He denies any dysuria/pyuria/ or foamy urine.  Kidney function upon admission at 2.4 with a rise in SCr to 2.9 on the 13th with another rise on the 14th to 3.5. Noted that on the 11th patients HR increased to 128 with BP reading of 90/50 along with another episode of hypotension on the 14th with a decrease in BP to 80/44 and increase in HR to 137.  He also is currently being treated for a UTI (Klebsiella Pneumoniae).    He underwent a thoracentesis on 12/14 with 1.3L of fluid removed, reported as being "cloudy", with cultures still pending at time " "of consultation.  He usually requires monthly scheduled paracentesis with his last one at the end of November, and reports they had around 5L removed at that time. Currently he has ascites and feels that is "almost time" to have another para.      Past Medical History:   Diagnosis Date    Arthritis     CAD (coronary artery disease)     H/o Coronary artery disease; resolved since heart transplant 2014    Cardiomyopathy     CHF (congestive heart failure)     Previously EF 20% (prior to heart transplant); last EF 55-60%; throught to be 2/2 genetics & DM1    Depression     Controlled "for the most part" on Lexipro    Encounter for blood transfusion     during transplant    GERD (gastroesophageal reflux disease)     Hashimoto's disease     HLD (hyperlipidemia) 6/11/2015    Hypoglycemia unawareness in type 1 diabetes mellitus     Hypothyroid     Initially hyperthyroid 2/2 Hoshimoto's thyroiditis; now on levothyroxine 150 mcg qd    Myocardial infarction     h/o MI x3 prior to heart transplant    Syncope 6/30/2015    Type I (juvenile type) diabetes mellitus with unspecified complication, uncontrolled     Controlled; Dx'd @7y/o; on N insulin 20U BID & Humalog 10U w/meals +SSI; Glu 89 11/9/17; A1c 7.2% 4/5/17    Unspecified essential hypertension 05/29/2014    Well controlled; Last /57 on 11/9/17       Past Surgical History:   Procedure Laterality Date    CARDIAC CATHETERIZATION      CARDIAC SURGERY      CHOLECYSTECTOMY      CORONARY ANGIOPLASTY      FOOT SPLIT TRANSFER OF THE POSTERIOR TIBIALIS TENDON PROCEDURE      HEART TRANSPLANT  2014    KNEE SURGERY      s/p oht  November 2014    stent placemnet         Review of patient's allergies indicates:   Allergen Reactions    No known drug allergies      Current Facility-Administered Medications   Medication Frequency    albuterol inhaler 2 puff Q6H PRN    aspirin EC tablet 81 mg Daily    azithromycin 500 mg in dextrose 5 % 250 mL IVPB (ready to " mix system) Q24H    cefepime in dextrose 5 % 1 gram/50 mL IVPB 1 g Q12H    dextrose 50% injection 12.5 g PRN    dextrose 50% injection 25 g PRN    escitalopram oxalate tablet 20 mg Daily    fluticasone 50 mcg/actuation nasal spray 2 spray Daily    gabapentin capsule 900 mg TID    glucagon (human recombinant) injection 1 mg PRN    glucose chewable tablet 16 g PRN    glucose chewable tablet 24 g PRN    guaiFENesin 12 hr tablet 600 mg BID    heparin (porcine) injection 5,000 Units Q8H    insulin aspart pen 0-5 Units QID (AC + HS) PRN    insulin aspart pen 5 Units TIDWM    levothyroxine tablet 150 mcg Before breakfast    lidocaine HCL 10 mg/ml (1%) injection 10 mL Once    magnesium oxide tablet 400 mg Daily    mycophenolate EC tablet 720 mg BID    nortriptyline capsule 25 mg QHS    ondansetron disintegrating tablet 8 mg Q8H PRN    pantoprazole EC tablet 40 mg Daily    pravastatin tablet 40 mg QHS    predniSONE tablet 2.5 mg Daily    promethazine-codeine 6.25-10 mg/5 ml syrup 5 mL Q6H PRN    sodium chloride 0.9% flush 3 mL Q8H    tacrolimus capsule 1 mg Once    [START ON 12/15/2017] tacrolimus capsule 2 mg BID    tamsulosin 24 hr capsule 0.4 mg Daily    torsemide tablet 40 mg Daily     Family History     Problem Relation (Age of Onset)    Cancer Brother (50)    Diabetes Mother, Father, Brother, Son, Sister, Maternal Uncle, Paternal Aunt, Maternal Grandmother, Maternal Grandfather    Heart disease Mother, Brother    Hypertension Mother, Father, Brother    Kidney disease Mother, Father    Stroke Father, Brother    Vision loss Brother        Social History Main Topics    Smoking status: Never Smoker    Smokeless tobacco: Never Used    Alcohol use No    Drug use: No    Sexual activity: Yes     Partners: Female     Review of Systems   Constitutional: Positive for activity change. Negative for appetite change, chills, diaphoresis and fever.   HENT: Negative for rhinorrhea, sinus pressure and  sore throat.    Respiratory: Positive for cough. Negative for shortness of breath and wheezing.    Cardiovascular: Negative for chest pain and leg swelling.   Gastrointestinal: Positive for abdominal distention. Negative for abdominal pain, constipation, diarrhea, nausea and vomiting.   Genitourinary: Negative for decreased urine volume, difficulty urinating, dysuria, frequency and hematuria.   Musculoskeletal: Negative for arthralgias and myalgias.   Skin: Negative for color change and rash.   Neurological: Negative for dizziness, syncope and headaches.   Psychiatric/Behavioral: Negative for agitation, behavioral problems and confusion.     Objective:     Vital Signs (Most Recent):  Temp: 98.3 °F (36.8 °C) (12/14/17 1208)  Pulse: 102 (12/14/17 1208)  Resp: 17 (12/14/17 1208)  BP: (!) 101/55 (12/14/17 1208)  SpO2: (!) 94 % (12/14/17 1208)  O2 Device (Oxygen Therapy): room air (12/14/17 1208) Vital Signs (24h Range):  Temp:  [97.7 °F (36.5 °C)-98.5 °F (36.9 °C)] 98.3 °F (36.8 °C)  Pulse:  [] 102  Resp:  [14-18] 17  SpO2:  [93 %-97 %] 94 %  BP: ()/(44-58) 101/55     Weight: 84.6 kg (186 lb 9.9 oz) (12/13/17 0558)  Body mass index is 29.23 kg/m².  Body surface area is 2 meters squared.    I/O last 3 completed shifts:  In: 790 [P.O.:340; IV Piggyback:450]  Out: 1000 [Urine:1000]    Physical Exam   Constitutional: He is oriented to person, place, and time. He appears well-developed and well-nourished. No distress.   HENT:   Head: Normocephalic and atraumatic.   Right Ear: External ear normal.   Left Ear: External ear normal.   Eyes: Conjunctivae and EOM are normal. Right eye exhibits no discharge. Left eye exhibits no discharge.   Neck: Normal range of motion. Neck supple. No JVD present.   Cardiovascular: Normal heart sounds and intact distal pulses.  Exam reveals no gallop and no friction rub.    No murmur heard.  Pulmonary/Chest: Effort normal. No respiratory distress. He has no wheezes. He has rales (R  lung fields).   Abdominal: Soft. Bowel sounds are normal. He exhibits distension. There is no tenderness.   Musculoskeletal: Normal range of motion. He exhibits no edema or deformity.   Neurological: He is alert and oriented to person, place, and time.   Skin: Skin is warm and dry. No rash noted. He is not diaphoretic. No erythema.   Psychiatric: He has a normal mood and affect. His behavior is normal.       Significant Labs:  CBC:   Recent Labs  Lab 12/14/17  0353   WBC 5.29   RBC 2.90*   HGB 8.8*   HCT 26.4*      MCV 91   MCH 30.3   MCHC 33.3     CMP:   Recent Labs  Lab 12/14/17  0353 12/14/17  1159    289*   CALCIUM 8.6* 8.7   ALBUMIN 2.6*  --    PROT 5.8*  --    * 132*   K 4.6 4.4   CO2 27 27   CL 97 94*   BUN 56* 58*   CREATININE 3.5* 3.6*   ALKPHOS 194*  --    ALT 13  --    AST 20  --    BILITOT 0.7  --          Assessment/Plan:     Chronic kidney disease (CKD), stage III (moderate)    Non-Oliguric CACHORRO on CKD III  -Baseline SCr ~2.4, followed by outpatient Nephrology in [place].  Upon admission on the 12th, appears his Kidney function was at baseline.  He is prescribed Torsemide 40 mg QD at home, which he reports compliance with, and has continued since admission to [facility].  He reports UOP has been adequate, although not accurately reported in chart.  He has also had episodes of hypotension with associated tachycardia during this times.  Most recent ECHO on the 12th with elevated PA pressures, but with pEF and RAP of ~ 8.  He also underwent thoracentesis on the 13th with 1.3L of fluid removed.    -current CACHORRO appears to be 2/2 intravascular volume depletion.  Can not rule out a component of ATN from hemodynamic changes related to tachycardia and hypotension.  -recommend holding Torsemide.    -patient reports that he is scheduled to have paracentesis today.  Would hold off as this could potentially worsen his   CACHORRO, as he will likely start filling his peritoneal cavity post, causing  intravascular depletion.    -Would recommend gentle hydration with NS @ 50 cc/hr x 1 liter.  -will obtain urine lytes, urinalysis, UPCR.  -renal US          Juaquin Ibrahim NP  Nephrology  [facility]  Pager:  [phone]

## 2017-12-14 NOTE — PROGRESS NOTES
[facility]  Heart Transplant  Progress Note    Patient Name: Lv Crocker  MRN: [id number]  Admission Date: 12/11/2017  Hospital Length of Stay: 3 days  Attending Physician: Jose A Lloyd MD  Primary Care Provider: Philippe Mohr MD  Principal Problem:Cough in adult    Subjective:     Interval History: No complaints overnight, cough much improved. Will adjust insulin regimen, as patient states he is always uncontrolled in the hospital when we take him off his long acting insulin.  Good UOP overnight (-1L) per patient although not recorded by nurse. Addressed nursing staff and verified that orders are in place for UOP monitoring q 8 hours.  Cr bumped today, higher than baseline. Denies flank pain, hematuria, fever. Consulted nephrology. Will also consult Interventional Radiology as patient is due for paracentesis and this may be contributing to his elevated Cr.    Continuous Infusions:  Scheduled Meds:   aspirin  81 mg Oral Daily    azithromycin  500 mg Intravenous Q24H    ceFEPime (MAXIPIME) IVPB  1 g Intravenous Q12H    escitalopram oxalate  20 mg Oral Daily    fluticasone  2 spray Each Nare Daily    gabapentin  900 mg Oral TID    guaiFENesin  600 mg Oral BID    heparin (porcine)  5,000 Units Subcutaneous Q8H    insulin aspart  5 Units Subcutaneous TIDWM    levothyroxine  150 mcg Oral Before breakfast    lidocaine HCL 10 mg/ml (1%)  10 mL Other Once    magnesium oxide  400 mg Oral Daily    mycophenolate  720 mg Oral BID    nortriptyline  25 mg Oral QHS    pantoprazole  40 mg Oral Daily    pravastatin  40 mg Oral QHS    predniSONE  2.5 mg Oral Daily    sodium chloride 0.9%  3 mL Intravenous Q8H    tacrolimus  1 mg Oral Once    [START ON 12/15/2017] tacrolimus  2 mg Oral BID    tamsulosin  0.4 mg Oral Daily    torsemide  40 mg Oral Daily     PRN Meds:albuterol, dextrose 50%, dextrose 50%, glucagon (human recombinant), glucose, glucose, insulin aspart, ondansetron,  promethazine-codeine 6.25-10 mg/5 ml    Review of patient's allergies indicates:   Allergen Reactions    No known drug allergies      Objective:     Vital Signs (Most Recent):  Temp: 98.3 °F (36.8 °C) (12/14/17 0757)  Pulse: 97 (12/14/17 1100)  Resp: 18 (12/14/17 0757)  BP: (!) 94/58 (12/14/17 0757)  SpO2: (!) 94 % (12/14/17 0757) Vital Signs (24h Range):  Temp:  [97.7 °F (36.5 °C)-98.5 °F (36.9 °C)] 98.3 °F (36.8 °C)  Pulse:  [] 97  Resp:  [14-18] 18  SpO2:  [93 %-97 %] 94 %  BP: (80-97)/(44-58) 94/58     Patient Vitals for the past 72 hrs (Last 3 readings):   Weight   12/13/17 0558 84.6 kg (186 lb 9.9 oz)   12/12/17 0500 87.3 kg (192 lb 7.4 oz)   12/11/17 1344 86.2 kg (190 lb)     Body mass index is 29.23 kg/m².      Intake/Output Summary (Last 24 hours) at 12/14/17 1142  Last data filed at 12/14/17 0900   Gross per 24 hour   Intake              840 ml   Output                0 ml   Net              840 ml     Hemodynamic Parameters:    Telemetry: Sinus tach    Physical Exam  Constitutional: NAD, conversant  HEENT: Sclera anicteric, PERRLA, EOMI  Neck: Unable to visualize JVD, no carotid bruits  CV: Tachy, no murmur, normal S1/S2  Pulm: some bronchial breath sounds in right base, no crackles or wheezes   GI: Abdomen soft, distended, NTTP, +BS. No flank TTP.  Extremities: trace edema, right>left  warm and well perfused  Skin: No ecchymosis, erythema, or ulcers  Psych: AOx3, appropriate affect    Significant Labs:  CBC:    Recent Labs  Lab 12/12/17  0401 12/13/17  0348 12/14/17  0353   WBC 5.88 6.70 5.29   RBC 2.90* 2.81* 2.90*   HGB 8.6* 8.7* 8.8*   HCT 26.6* 25.7* 26.4*    202 239   MCV 92 92 91   MCH 29.7 31.0 30.3   MCHC 32.3 33.9 33.3     BNP:    Recent Labs  Lab 12/11/17  1750   *     CMP:    Recent Labs  Lab 12/11/17  1750 12/12/17  0401 12/13/17  0348 12/13/17  1504 12/14/17  0353   GLU 82 163* 208*  --  105   CALCIUM 9.3 8.7 8.9  --  8.6*   ALBUMIN 3.2*  --  2.8*  --  2.6*   PROT 6.7   --  6.1 6.4 5.8*    135* 134*  --  135*   K 4.3 4.4 4.6  --  4.6   CO2 27 28 28  --  27    97 96  --  97   BUN 36* 36* 47*  --  56*   CREATININE 2.6* 2.4* 2.9*  --  3.5*   ALKPHOS 244*  --  220*  --  194*   ALT 18  --  16  --  13   AST 30  --  25  --  20   BILITOT 1.1*  --  1.2*  --  0.7      Coagulation:   No results for input(s): PT, INR, APTT in the last 168 hours.  LDH:    Recent Labs  Lab 12/13/17  1504        Microbiology:  Microbiology Results (last 7 days)     Procedure Component Value Units Date/Time    Culture, Respiratory with Gram Stain [[id number]] Collected:  12/12/17 1101    Order Status:  Completed Specimen:  Respiratory from Sputum, Expectorated Updated:  12/14/17 0926     Respiratory Culture Normal respiratory hank     Gram Stain (Respiratory) <10 epithelial cells per low power field.     Gram Stain (Respiratory) Rare WBC's     Gram Stain (Respiratory) Few Gram negative rods     Gram Stain (Respiratory) Rare Gram positive cocci    Aerobic culture [[id number]] Collected:  12/13/17 1448    Order Status:  Completed Specimen:  Body Fluid from Pleural Fluid Updated:  12/14/17 0749     Aerobic Bacterial Culture No growth    Blood culture #1 **CANNOT BE ORDERED STAT** [[id number]] Collected:  12/11/17 1750    Order Status:  Completed Specimen:  Blood from Peripheral, Hand, Right Updated:  12/13/17 2012     Blood Culture, Routine No Growth to date     Blood Culture, Routine No Growth to date     Blood Culture, Routine No Growth to date    Blood culture #2 **CANNOT BE ORDERED STAT** [[id number]] Collected:  12/11/17 1756    Order Status:  Completed Specimen:  Blood from Peripheral, Hand, Left Updated:  12/13/17 2012     Blood Culture, Routine No Growth to date     Blood Culture, Routine No Growth to date     Blood Culture, Routine No Growth to date    Urine culture [[id number]]  (Susceptibility) Collected:  12/11/17 2022    Order Status:  Completed Specimen:  Urine from Urine, Clean Catch  Updated:  12/13/17 1809     Urine Culture, Routine --     KLEBSIELLA PNEUMONIAE  >100,000 cfu/ml      KOH prep [[id number]] Collected:  12/13/17 1455    Order Status:  Completed Specimen:  Body Fluid from Pleural Fluid Updated:  12/13/17 1736     KOH Prep No yeast or fungal elements seen    Culture, Anaerobe [[id number]] Collected:  12/13/17 1448    Order Status:  Sent Specimen:  Body Fluid from Pleural Fluid Updated:  12/13/17 1538    AFB culture (includes stain) [[id number]] Collected:  12/13/17 1448    Order Status:  Sent Specimen:  Body Fluid from Pleural Fluid Updated:  12/13/17 1537    Fungus culture [[id number]] Collected:  12/13/17 1448    Order Status:  Sent Specimen:  Body Fluid from Pleural Fluid Updated:  12/13/17 1537    Respiratory Viral Panel by PCR [facility]; Nasal Swab [[id number]] Collected:  12/11/17 2022    Order Status:  Completed Specimen:  Respiratory Updated:  12/13/17 1328     Respiratory Virus Panel, source Nasal Swab     RVP - Adenovirus Not Detected     Comment: Respiratory Viral Panel is a product of [employer].  It has been approved or cleared by the U.S. Food and Drug  Administration for in vitro diagnostic use.  Results should be  used in conjunction with clinical findings, and should not form  the sole basis for a diagnosis or treatment decision.  Negative results do not preclude respiratory virus infection  and should not be used as the sole basis for diagnosis,   treatment, or other management decisions.  Positive results do not rule out bacterial infection, or  co-infection with other viruses.  The agent detected may not  be the definitive cause of the disease. The use of additional   laboratory testing (e.g. bacterial culture, immunofluorescence,  radiography) and clinical presentation must be taken into  consideration in order to obtain the final diagnosis of   respiratory viral infection.  The RVP assay cannot adequately detect Adenovirus species C,  or serotypes 7a and  41.  The RVP primers for detection of   rhinovirus have been shown to cross-react with enterovirus.  A rhinovirus reactive result should be confirmed by an   alternative method (e.g. cell culture).  The  of the Respiratory Viral Panel has   recommmended that specimens found to be negative for  Adenovirus be confirmed by an alternative method.  The  of the Respiratory Viral Panel has  recommended that specimens found to be negative for   Influenza be confirmed by an alternative method.          Enterovirus Not Detected     Comment: Cross-reactivity has been observed between certain Rhinovirus  strains and the Enterovirus assay.          Human Bocavirus Not Detected     Human Coronavirus Not Detected     Comment: The Human Coronavirus assay detects Human coronavirus types  229E, OC43,NL63 and HKO1.          RVP - Human Metapneumovirus (hMPV) Not Detected     RVP - Influenza A Not Detected     Influenza A - [id number] Not Detected     RVP - Influenza B Not Detected     Parainfluenza Not Detected     Respiratory Syncytial VirusVirus (RSV) A Not Detected     Comment: The Respiratory Syncytial Viral assay detects types A and B,  however it does not distinguish between the two.          RVP - Rhinovirus Not Detected     Comment: Target Enriched Mulitplex Polymerase Chain Reaction (TEM-PCR)  allows for the detection of multiple pathogens out of a single  reaction.  This test was developed and its performance   characteristics determined by [employer].  It has not   been cleared or approved by the U.S.Food and Drug Administration.  Results should be used in conjunction with clinical findings,   and should not form the sole basis for a diagnosis or treatment  decision.  TEM-PCR is a licensed technology of [employer].         Narrative:       Receiving Lab:->[facility]          I have reviewed all pertinent labs within the past 24 hours.    Estimated Creatinine Clearance: 28 mL/min  (based on SCr of 3.5 mg/dL (H)).    Diagnostic Results:  I have reviewed all pertinent imaging results/findings within the past 24 hours.    Assessment and Plan:       Lv Crocker is a  40 yo  male s/p OHTX 11/14, complicated by AMR requiring intensive immunosuppression subsequent to which he developed pancytopenia and resolution with continued recurrent ascites requiring scheduled monthly paracentesis presenting with 10 days complain of cough and pleurisy. Patient has been visiting urgent care with similar symptoms for the last three days and romero including CXR, blood cultures as well as urine culture has been negative. He was prescribed inhaler for possible URI and discharged two days ago. Today, he came to the ER with Fever of 103 and generalized myalgia. he report dry cough, constant throughout the day and sometimes causing him to vomit after an episode. He denies sick contact, hemoptysis or recent travel. He did get both influenza and pneumonia immunization. His last paracentesis was three weeks ago without any complication to the site or internally. He denies weight gain, leg edema, PND, orthopnea or AGUILAR. He also denies dysuria, hematuria, sinus tenderness or drainage. He had an XR yesterday that showed persistent R-sided ascites without without clear infiltrate. He reports compliant with current OHT regimen and that his prograf was changed to 3:3 dose three weeks ago.    * Cough in adult    - Unclear source  - Prior bx/IVUS results negative for CAV  - start broad spec abx including vanco x1, continue cefepime and azithromycin (renal dose)  - consult Transplant ID for assistance  - Pan culture including respiratory panel, flu and strep -yana, CMV pending.   - Bcx NGTD  - CXR + for recollection to the R-  - CT chest w/ Moderate volume right pleural fluid that is worsening (chronic).  Bilateral diffuse patchy consolidation and groundglass densities that are new since radiograph 11/30/2017 and appear worse than  chest radiograph 12/10/2017.  - DDx: Pneumonia   - Thoracentesis yesterday, awaiting results. Appreciate help of Pulmonary    - May have paracentesis Thursday/Friday for volume removal. Consulted IR today.  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  - monthly scheduled paracentesis. Last one three weeks ago. Consulted IR today for paracentesis today or tomorrow.  - CXR + for recollection to the R- but not significant for repeat paracentesis.  - continue demedex as current and fluid restriction.        Type 1 diabetes mellitus with hyperglycemia    - On Humalog BID at home  - Adjusted today, continue   - Monitor closely and adjust as needed        Chronic kidney disease (CKD), stage III (moderate)    - CACHORRO on CKD. Baseline creat is ~2.2 (up to 3.5 today) Highest on record. Will consult neph.  - renal dose abx and DVT prophylaxis  - monitor closely during stay        Heart transplanted    - S/P OHTx 11/8/14 (high risk)   - history of AMR treated with PP and IVIG 4/2016   - Biopsy 9/12/16 negative for rejection   - [facility] 2/10/16 no CAV  - Prograf recently increased to 3/3, continue pred 2.5 and Myfortic 720 BID  - Prograf goal 6-8, 11 today so will drop to 2/2 and only give 1 tonight.   - per patient his Cr bumps historically when his TAC is high. Consulted Nephrology  - Repeat echo w/ preserved EF. Patient has known (chronic) restriction since transplant            Kelsea Ryan PA-C  Heart Transplant  [facility]

## 2017-12-14 NOTE — ASSESSMENT & PLAN NOTE
Non-Oliguric CACHORRO on CKD III  -Baseline SCr ~2.4, followed by outpatient Nephrology in [place].  Upon admission on the 12th, appears his Kidney function was at baseline.  He is prescribed Torsemide 40 mg QD at home, which he reports compliance with, and has continued since admission to [facility].  He reports UOP has been adequate, although not accurately reported in chart.  He has also had episodes of hypotension with associated tachycardia during this times.  Most recent ECHO on the 12th with elevated PA pressures, but with pEF and RAP of ~ 8.  He also underwent thoracentesis on the 13th with 1.3L of fluid removed.    -current CACHORRO appears to be 2/2 intravascular volume depletion.  Can not rule out a component of ATN from hemodynamic changes related to tachycardia and hypotension.  -recommend holding Torsemide.    -patient reports that he is scheduled to have paracentesis today.  Would hold off as this could potentially worsen his   CACHORRO, as he will likely start filling his peritoneal cavity post, causing intravascular depletion.    -Would recommend gentle hydration with NS @ 50 cc/hr x 1 liter.  -will obtain urine lytes, urinalysis, UPCR.  -renal US

## 2017-12-14 NOTE — PLAN OF CARE
Problem: Patient Care Overview  Goal: Plan of Care Review  Outcome: Ongoing (interventions implemented as appropriate)  Pt AAOx4. All vital signs stable. Pt denies the presence of pain throughout shift. Pt remains free from falls and injuries throughout shift. Bed in lowest and locked position. Call bell and personal items within reach of pt. IV antibiotics continued, pt tolerating well. Pt being stated on continuous IV fluids at 50 mL/hr. Pt went down for a paracentesis this afternoon, 2.6 L removed. Strict intake and output. Urine sample sent to lab today. Pt creatinine 3.6 today, up from baseline. No other changes. Will continue to monitor pt.

## 2017-12-14 NOTE — NURSING
Notified Dr. Lloyd regard patient's BS of 400, Dr Lloyd said to just give his 5 units SS and 20 Units detemir. I recheck BS at 0000 and it was 257.

## 2017-12-14 NOTE — NURSING
Notified Dr Lloyd regarding patient BP of 850/44 and pulse of 137. Patient is asymptomatic, is on continous fluids at 75ml/hr, Dr Lloyd said to leave patient as he is.

## 2017-12-15 NOTE — SUBJECTIVE & OBJECTIVE
Interval History: No complaints overnight, cough continues to improve. Got a good night sleep. Would like to go home if possible tomorrow. Agree if patients Cr continues to downtrend he can go home with labs (TAC and BMP early next week). It is patients wedding anniversary tomorrow.    Continuous Infusions:   sodium chloride 0.9%       Scheduled Meds:   aspirin  81 mg Oral Daily    azithromycin  500 mg Intravenous Q24H    ceFEPime (MAXIPIME) IVPB  1 g Intravenous Q12H    escitalopram oxalate  20 mg Oral Daily    fluticasone  2 spray Each Nare Daily    gabapentin  900 mg Oral TID    guaiFENesin  600 mg Oral BID    heparin (porcine)  5,000 Units Subcutaneous Q8H    insulin aspart  5 Units Subcutaneous TIDWM    levothyroxine  150 mcg Oral Before breakfast    lidocaine HCL 10 mg/ml (1%)  10 mL Other Once    magnesium oxide  400 mg Oral Daily    mycophenolate  720 mg Oral BID    nortriptyline  25 mg Oral QHS    pantoprazole  40 mg Oral Daily    pravastatin  40 mg Oral QHS    predniSONE  2.5 mg Oral Daily    sodium chloride 0.9%  3 mL Intravenous Q8H    [START ON 12/16/2017] tacrolimus  2 mg Oral Daily    tacrolimus  3 mg Oral Daily    tamsulosin  0.4 mg Oral Daily     PRN Meds:albuterol, dextrose 50%, dextrose 50%, glucagon (human recombinant), glucose, glucose, insulin aspart, ondansetron, promethazine-codeine 6.25-10 mg/5 ml    Review of patient's allergies indicates:   Allergen Reactions    No known drug allergies      Objective:     Vital Signs (Most Recent):  Temp: 97.9 °F (36.6 °C) (12/15/17 0749)  Pulse: 96 (12/15/17 0800)  Resp: 18 (12/15/17 0749)  BP: (!) 93/56 (12/15/17 0749)  SpO2: 96 % (12/15/17 0749) Vital Signs (24h Range):  Temp:  [97.6 °F (36.4 °C)-98.8 °F (37.1 °C)] 97.9 °F (36.6 °C)  Pulse:  [] 96  Resp:  [16-18] 18  SpO2:  [94 %-97 %] 96 %  BP: ()/(47-72) 93/56     Patient Vitals for the past 72 hrs (Last 3 readings):   Weight   12/14/17 2018 84.7 kg (186 lb 11 oz)    12/13/17 0558 84.6 kg (186 lb 9.9 oz)     Body mass index is 29.24 kg/m².      Intake/Output Summary (Last 24 hours) at 12/15/17 1131  Last data filed at 12/15/17 0600   Gross per 24 hour   Intake              350 ml   Output             3320 ml   Net            -2970 ml     Hemodynamic Parameters:    Telemetry: Sinus tach    Physical Exam  Constitutional: NAD, conversant  HEENT: Sclera anicteric, PERRLA, EOMI  Neck: Unable to visualize JVD, no carotid bruits  CV: Tachy, no murmur, normal S1/S2  Pulm: some bronchial breath sounds in right base, no crackles or wheezes   GI: Abdomen soft, distended, NTTP, +BS. No flank TTP.  Extremities: trace edema, right>left  warm and well perfused  Skin: No ecchymosis, erythema, or ulcers  Psych: AOx3, appropriate affect    Significant Labs:  CBC:    Recent Labs  Lab 12/13/17  0348 12/14/17  0353 12/15/17  0556   WBC 6.70 5.29 4.28   RBC 2.81* 2.90* 2.91*   HGB 8.7* 8.8* 9.0*   HCT 25.7* 26.4* 26.3*    239 195   MCV 92 91 90   MCH 31.0 30.3 30.9   MCHC 33.9 33.3 34.2     BNP:    Recent Labs  Lab 12/11/17  1750   *     CMP:    Recent Labs  Lab 12/13/17  0348 12/13/17  1504 12/14/17  0353 12/14/17  1159 12/15/17  0555   *  --  105 289* 236*   CALCIUM 8.9  --  8.6* 8.7 8.3*   ALBUMIN 2.8*  --  2.6*  --  2.5*   PROT 6.1 6.4 5.8*  --  5.5*   *  --  135* 132* 132*   K 4.6  --  4.6 4.4 4.5   CO2 28  --  27 27 23   CL 96  --  97 94* 97   BUN 47*  --  56* 58* 62*   CREATININE 2.9*  --  3.5* 3.6* 3.4*   ALKPHOS 220*  --  194*  --  195*   ALT 16  --  13  --  12   AST 25  --  20  --  21   BILITOT 1.2*  --  0.7  --  0.6      Coagulation:   No results for input(s): PT, INR, APTT in the last 168 hours.  LDH:    Recent Labs  Lab 12/13/17  1504        Microbiology:  Microbiology Results (last 7 days)     Procedure Component Value Units Date/Time    AFB culture (includes stain) [[id number]] Collected:  12/13/17 1448    Order Status:  Completed Specimen:  Body  Fluid from Pleural Fluid Updated:  12/14/17 2127     AFB Culture & Smear Culture in progress     AFB CULTURE STAIN No acid fast bacilli seen.    Blood culture #1 **CANNOT BE ORDERED STAT** [[id number]] Collected:  12/11/17 1750    Order Status:  Completed Specimen:  Blood from Peripheral, Hand, Right Updated:  12/14/17 2012     Blood Culture, Routine No Growth to date     Blood Culture, Routine No Growth to date     Blood Culture, Routine No Growth to date     Blood Culture, Routine No Growth to date    Blood culture #2 **CANNOT BE ORDERED STAT** [[id number]] Collected:  12/11/17 1756    Order Status:  Completed Specimen:  Blood from Peripheral, Hand, Left Updated:  12/14/17 2012     Blood Culture, Routine No Growth to date     Blood Culture, Routine No Growth to date     Blood Culture, Routine No Growth to date     Blood Culture, Routine No Growth to date    Culture, Anaerobe [[id number]] Collected:  12/13/17 1448    Order Status:  Completed Specimen:  Body Fluid from Pleural Fluid Updated:  12/14/17 1546     Anaerobic Culture Culture in progress    Fungus culture [[id number]] Collected:  12/13/17 1448    Order Status:  Completed Specimen:  Body Fluid from Pleural Fluid Updated:  12/14/17 1403     Fungus (Mycology) Culture Culture in progress    Culture, Respiratory with Gram Stain [[id number]] Collected:  12/12/17 1101    Order Status:  Completed Specimen:  Respiratory from Sputum, Expectorated Updated:  12/14/17 0926     Respiratory Culture Normal respiratory hank     Gram Stain (Respiratory) <10 epithelial cells per low power field.     Gram Stain (Respiratory) Rare WBC's     Gram Stain (Respiratory) Few Gram negative rods     Gram Stain (Respiratory) Rare Gram positive cocci    Aerobic culture [[id number]] Collected:  12/13/17 1448    Order Status:  Completed Specimen:  Body Fluid from Pleural Fluid Updated:  12/14/17 0749     Aerobic Bacterial Culture No growth    Urine culture [[id number]]  (Susceptibility)  Collected:  12/11/17 2022    Order Status:  Completed Specimen:  Urine from Urine, Clean Catch Updated:  12/13/17 1809     Urine Culture, Routine --     KLEBSIELLA PNEUMONIAE  >100,000 cfu/ml      KOH prep [[id number]] Collected:  12/13/17 1455    Order Status:  Completed Specimen:  Body Fluid from Pleural Fluid Updated:  12/13/17 1736     KOH Prep No yeast or fungal elements seen    Respiratory Viral Panel by PCR [facility]; Nasal Swab [[id number]] Collected:  12/11/17 2022    Order Status:  Completed Specimen:  Respiratory Updated:  12/13/17 1328     Respiratory Virus Panel, source Nasal Swab     RVP - Adenovirus Not Detected     Comment: Respiratory Viral Panel is a product of [employer].  It has been approved or cleared by the [place]. Food and Drug  Administration for in vitro diagnostic use.  Results should be  used in conjunction with clinical findings, and should not form  the sole basis for a diagnosis or treatment decision.  Negative results do not preclude respiratory virus infection  and should not be used as the sole basis for diagnosis,   treatment, or other management decisions.  Positive results do not rule out bacterial infection, or  co-infection with other viruses.  The agent detected may not  be the definitive cause of the disease. The use of additional   laboratory testing (e.g. bacterial culture, immunofluorescence,  radiography) and clinical presentation must be taken into  consideration in order to obtain the final diagnosis of   respiratory viral infection.  The RVP assay cannot adequately detect Adenovirus species C,  or serotypes 7a and 41.  The RVP primers for detection of   rhinovirus have been shown to cross-react with enterovirus.  A rhinovirus reactive result should be confirmed by an   alternative method (e.g. cell culture).  The  of the Respiratory Viral Panel has   recommmended that specimens found to be negative for  Adenovirus be confirmed by an alternative  method.  The  of the Respiratory Viral Panel has  recommended that specimens found to be negative for   Influenza be confirmed by an alternative method.          Enterovirus Not Detected     Comment: Cross-reactivity has been observed between certain Rhinovirus  strains and the Enterovirus assay.          Human Bocavirus Not Detected     Human Coronavirus Not Detected     Comment: The Human Coronavirus assay detects Human coronavirus types  229E, OC43,NL63 and HKO1.          RVP - Human Metapneumovirus (hMPV) Not Detected     RVP - Influenza A Not Detected     Influenza A - C5X1-04 Not Detected     RVP - Influenza B Not Detected     Parainfluenza Not Detected     Respiratory Syncytial VirusVirus (RSV) A Not Detected     Comment: The Respiratory Syncytial Viral assay detects types A and B,  however it does not distinguish between the two.          RVP - Rhinovirus Not Detected     Comment: Target Enriched Mulitplex Polymerase Chain Reaction (TEM-PCR)  allows for the detection of multiple pathogens out of a single  reaction.  This test was developed and its performance   characteristics determined by [employer].  It has not   been cleared or approved by the U.S.Food and Drug Administration.  Results should be used in conjunction with clinical findings,   and should not form the sole basis for a diagnosis or treatment  decision.  TEM-PCR is a licensed technology of [employer].         Narrative:       Receiving Lab:->[facility]          I have reviewed all pertinent labs within the past 24 hours.    Estimated Creatinine Clearance: 28.8 mL/min (based on SCr of 3.4 mg/dL (H)).    Diagnostic Results:  I have reviewed all pertinent imaging results/findings within the past 24 hours.

## 2017-12-15 NOTE — SUBJECTIVE & OBJECTIVE
"Interval History:   Underwent paracentesis yesterday with 1.6L of fluid removed.  He voices that he feels "better today", no complaints on examination.  Torsemide dose given in the AM, currently on hold.  He was started on gentle hydration last night.  SCr this morning with slight improvement (down to 3.4 from 3.6), other electrolytes stable.  Urinary sodium <20 despite being on diuretics.    Review of patient's allergies indicates:   Allergen Reactions    No known drug allergies      Current Facility-Administered Medications   Medication Frequency    0.9%  NaCl infusion Continuous    albuterol inhaler 2 puff Q6H PRN    aspirin EC tablet 81 mg Daily    azithromycin 500 mg in dextrose 5 % 250 mL IVPB (ready to mix system) Q24H    cefepime in dextrose 5 % 1 gram/50 mL IVPB 1 g Q12H    dextrose 50% injection 12.5 g PRN    dextrose 50% injection 25 g PRN    escitalopram oxalate tablet 20 mg Daily    fluticasone 50 mcg/actuation nasal spray 2 spray Daily    gabapentin capsule 900 mg TID    glucagon (human recombinant) injection 1 mg PRN    glucose chewable tablet 16 g PRN    glucose chewable tablet 24 g PRN    guaiFENesin 12 hr tablet 600 mg BID    heparin (porcine) injection 5,000 Units Q8H    insulin aspart pen 0-5 Units QID (AC + HS) PRN    insulin aspart pen 5 Units TIDWM    levothyroxine tablet 150 mcg Before breakfast    lidocaine HCL 10 mg/ml (1%) injection 10 mL Once    magnesium oxide tablet 400 mg Daily    mycophenolate EC tablet 720 mg BID    nortriptyline capsule 25 mg QHS    ondansetron disintegrating tablet 8 mg Q8H PRN    pantoprazole EC tablet 40 mg Daily    pravastatin tablet 40 mg QHS    predniSONE tablet 2.5 mg Daily    promethazine-codeine 6.25-10 mg/5 ml syrup 5 mL Q6H PRN    sodium chloride 0.9% flush 3 mL Q8H    [START ON 12/16/2017] tacrolimus capsule 2 mg Daily    tacrolimus capsule 3 mg Daily    tamsulosin 24 hr capsule 0.4 mg Daily       Objective:     Vital " Signs (Most Recent):  Temp: 97.9 °F (36.6 °C) (12/15/17 0749)  Pulse: 96 (12/15/17 1240)  Resp: 17 (12/15/17 1240)  BP: (!) 105/57 (12/15/17 1240)  SpO2: 96 % (12/15/17 1240)  O2 Device (Oxygen Therapy): room air (12/15/17 1240) Vital Signs (24h Range):  Temp:  [97.6 °F (36.4 °C)-98.8 °F (37.1 °C)] 97.9 °F (36.6 °C)  Pulse:  [88-99] 96  Resp:  [16-18] 17  SpO2:  [94 %-97 %] 96 %  BP: ()/(47-72) 105/57     Weight: 84.7 kg (186 lb 11 oz) (12/14/17 2018)  Body mass index is 29.24 kg/m².  Body surface area is 2 meters squared.    I/O last 3 completed shifts:  In: 850 [P.O.:500; IV Piggyback:350]  Out: 3320 [Urine:720; Other:2600]    Physical Exam   Constitutional: He is oriented to person, place, and time. He appears well-developed and well-nourished. No distress.   HENT:   Head: Normocephalic and atraumatic.   Right Ear: External ear normal.   Left Ear: External ear normal.   Eyes: Conjunctivae and EOM are normal. Right eye exhibits no discharge. Left eye exhibits no discharge.   Neck: Normal range of motion. Neck supple. No JVD present.   Cardiovascular: Normal heart sounds and intact distal pulses.  Exam reveals no gallop and no friction rub.    No murmur heard.  Pulmonary/Chest: Effort normal. No respiratory distress. He has decreased breath sounds in the right upper field and the right middle field. He has no wheezes. He has rales (R base).   Abdominal: Soft. Bowel sounds are normal. He exhibits no distension. There is no tenderness.   Musculoskeletal: Normal range of motion. He exhibits no edema or deformity.   Neurological: He is alert and oriented to person, place, and time.   Skin: Skin is warm and dry. No rash noted. He is not diaphoretic. No erythema.   Psychiatric: He has a normal mood and affect. His behavior is normal.       Significant Labs:  CBC:   Recent Labs  Lab 12/15/17  0556   WBC 4.28   RBC 2.91*   HGB 9.0*   HCT 26.3*      MCV 90   MCH 30.9   MCHC 34.2     CMP:   Recent Labs  Lab  12/15/17  0555   *   CALCIUM 8.3*   ALBUMIN 2.5*   PROT 5.5*   *   K 4.5   CO2 23   CL 97   BUN 62*   CREATININE 3.4*   ALKPHOS 195*   ALT 12   AST 21   BILITOT 0.6

## 2017-12-15 NOTE — PROGRESS NOTES
[facility]  Heart Transplant  Progress Note    Patient Name: Lv Crocker  MRN: [id number]  Admission Date: 12/11/2017  Hospital Length of Stay: 4 days  Attending Physician: Jose A Lloyd MD  Primary Care Provider: Philippe Mohr MD  Principal Problem:Cough in adult    Subjective:     Interval History: No complaints overnight, cough continues to improve. Got a good night sleep. Would like to go home if possible tomorrow. Agree if patients Cr continues to downtrend he can go home with labs (TAC and BMP early next week). It is patients wedding anniversary tomorrow.    Continuous Infusions:   sodium chloride 0.9%       Scheduled Meds:   aspirin  81 mg Oral Daily    azithromycin  500 mg Intravenous Q24H    ceFEPime (MAXIPIME) IVPB  1 g Intravenous Q12H    escitalopram oxalate  20 mg Oral Daily    fluticasone  2 spray Each Nare Daily    gabapentin  900 mg Oral TID    guaiFENesin  600 mg Oral BID    heparin (porcine)  5,000 Units Subcutaneous Q8H    insulin aspart  5 Units Subcutaneous TIDWM    levothyroxine  150 mcg Oral Before breakfast    lidocaine HCL 10 mg/ml (1%)  10 mL Other Once    magnesium oxide  400 mg Oral Daily    mycophenolate  720 mg Oral BID    nortriptyline  25 mg Oral QHS    pantoprazole  40 mg Oral Daily    pravastatin  40 mg Oral QHS    predniSONE  2.5 mg Oral Daily    sodium chloride 0.9%  3 mL Intravenous Q8H    [START ON 12/16/2017] tacrolimus  2 mg Oral Daily    tacrolimus  3 mg Oral Daily    tamsulosin  0.4 mg Oral Daily     PRN Meds:albuterol, dextrose 50%, dextrose 50%, glucagon (human recombinant), glucose, glucose, insulin aspart, ondansetron, promethazine-codeine 6.25-10 mg/5 ml    Review of patient's allergies indicates:   Allergen Reactions    No known drug allergies      Objective:     Vital Signs (Most Recent):  Temp: 97.9 °F (36.6 °C) (12/15/17 0749)  Pulse: 96 (12/15/17 0800)  Resp: 18 (12/15/17 0749)  BP: (!) 93/56 (12/15/17  0749)  SpO2: 96 % (12/15/17 0749) Vital Signs (24h Range):  Temp:  [97.6 °F (36.4 °C)-98.8 °F (37.1 °C)] 97.9 °F (36.6 °C)  Pulse:  [] 96  Resp:  [16-18] 18  SpO2:  [94 %-97 %] 96 %  BP: ()/(47-72) 93/56     Patient Vitals for the past 72 hrs (Last 3 readings):   Weight   12/14/17 2018 84.7 kg (186 lb 11 oz)   12/13/17 0558 84.6 kg (186 lb 9.9 oz)     Body mass index is 29.24 kg/m².      Intake/Output Summary (Last 24 hours) at 12/15/17 1131  Last data filed at 12/15/17 0600   Gross per 24 hour   Intake              350 ml   Output             3320 ml   Net            -2970 ml     Hemodynamic Parameters:    Telemetry: Sinus tach    Physical Exam  Constitutional: NAD, conversant  HEENT: Sclera anicteric, PERRLA, EOMI  Neck: Unable to visualize JVD, no carotid bruits  CV: Tachy, no murmur, normal S1/S2  Pulm: some bronchial breath sounds in right base, no crackles or wheezes   GI: Abdomen soft, distended, NTTP, +BS. No flank TTP.  Extremities: trace edema, right>left  warm and well perfused  Skin: No ecchymosis, erythema, or ulcers  Psych: AOx3, appropriate affect    Significant Labs:  CBC:    Recent Labs  Lab 12/13/17  0348 12/14/17  0353 12/15/17  0556   WBC 6.70 5.29 4.28   RBC 2.81* 2.90* 2.91*   HGB 8.7* 8.8* 9.0*   HCT 25.7* 26.4* 26.3*    239 195   MCV 92 91 90   MCH 31.0 30.3 30.9   MCHC 33.9 33.3 34.2     BNP:    Recent Labs  Lab 12/11/17  1750   *     CMP:    Recent Labs  Lab 12/13/17  0348 12/13/17  1504 12/14/17  0353 12/14/17  1159 12/15/17  0555   *  --  105 289* 236*   CALCIUM 8.9  --  8.6* 8.7 8.3*   ALBUMIN 2.8*  --  2.6*  --  2.5*   PROT 6.1 6.4 5.8*  --  5.5*   *  --  135* 132* 132*   K 4.6  --  4.6 4.4 4.5   CO2 28  --  27 27 23   CL 96  --  97 94* 97   BUN 47*  --  56* 58* 62*   CREATININE 2.9*  --  3.5* 3.6* 3.4*   ALKPHOS 220*  --  194*  --  195*   ALT 16  --  13  --  12   AST 25  --  20  --  21   BILITOT 1.2*  --  0.7  --  0.6      Coagulation:   No  results for input(s): PT, INR, APTT in the last 168 hours.  LDH:    Recent Labs  Lab 12/13/17  1504        Microbiology:  Microbiology Results (last 7 days)     Procedure Component Value Units Date/Time    AFB culture (includes stain) [[id number]] Collected:  12/13/17 1448    Order Status:  Completed Specimen:  Body Fluid from Pleural Fluid Updated:  12/14/17 2127     AFB Culture & Smear Culture in progress     AFB CULTURE STAIN No acid fast bacilli seen.    Blood culture #1 **CANNOT BE ORDERED STAT** [[id number]] Collected:  12/11/17 1750    Order Status:  Completed Specimen:  Blood from Peripheral, Hand, Right Updated:  12/14/17 2012     Blood Culture, Routine No Growth to date     Blood Culture, Routine No Growth to date     Blood Culture, Routine No Growth to date     Blood Culture, Routine No Growth to date    Blood culture #2 **CANNOT BE ORDERED STAT** [[id number]] Collected:  12/11/17 1756    Order Status:  Completed Specimen:  Blood from Peripheral, Hand, Left Updated:  12/14/17 2012     Blood Culture, Routine No Growth to date     Blood Culture, Routine No Growth to date     Blood Culture, Routine No Growth to date     Blood Culture, Routine No Growth to date    Culture, Anaerobe [[id number]] Collected:  12/13/17 1448    Order Status:  Completed Specimen:  Body Fluid from Pleural Fluid Updated:  12/14/17 1546     Anaerobic Culture Culture in progress    Fungus culture [[id number]] Collected:  12/13/17 1448    Order Status:  Completed Specimen:  Body Fluid from Pleural Fluid Updated:  12/14/17 1403     Fungus (Mycology) Culture Culture in progress    Culture, Respiratory with Gram Stain [[id number]] Collected:  12/12/17 1101    Order Status:  Completed Specimen:  Respiratory from Sputum, Expectorated Updated:  12/14/17 0926     Respiratory Culture Normal respiratory hank     Gram Stain (Respiratory) <10 epithelial cells per low power field.     Gram Stain (Respiratory) Rare WBC's     Gram Stain  (Respiratory) Few Gram negative rods     Gram Stain (Respiratory) Rare Gram positive cocci    Aerobic culture [[id number]] Collected:  12/13/17 1448    Order Status:  Completed Specimen:  Body Fluid from Pleural Fluid Updated:  12/14/17 0749     Aerobic Bacterial Culture No growth    Urine culture [[id number]]  (Susceptibility) Collected:  12/11/17 2022    Order Status:  Completed Specimen:  Urine from Urine, Clean Catch Updated:  12/13/17 1809     Urine Culture, Routine --     KLEBSIELLA PNEUMONIAE  >100,000 cfu/ml      KOH prep [[id number]] Collected:  12/13/17 1455    Order Status:  Completed Specimen:  Body Fluid from Pleural Fluid Updated:  12/13/17 1736     KOH Prep No yeast or fungal elements seen    Respiratory Viral Panel by PCR [facility]; Nasal Swab [[id number]] Collected:  12/11/17 2022    Order Status:  Completed Specimen:  Respiratory Updated:  12/13/17 1328     Respiratory Virus Panel, source Nasal Swab     RVP - Adenovirus Not Detected     Comment: Respiratory Viral Panel is a product of [employer]  It has been approved or cleared by the U.S. Food and Drug  Administration for in vitro diagnostic use.  Results should be  used in conjunction with clinical findings, and should not form  the sole basis for a diagnosis or treatment decision.  Negative results do not preclude respiratory virus infection  and should not be used as the sole basis for diagnosis,   treatment, or other management decisions.  Positive results do not rule out bacterial infection, or  co-infection with other viruses.  The agent detected may not  be the definitive cause of the disease. The use of additional   laboratory testing (e.g. bacterial culture, immunofluorescence,  radiography) and clinical presentation must be taken into  consideration in order to obtain the final diagnosis of   respiratory viral infection.  The RVP assay cannot adequately detect Adenovirus species C,  or serotypes 7a and 41.  The RVP primers for  detection of   rhinovirus have been shown to cross-react with enterovirus.  A rhinovirus reactive result should be confirmed by an   alternative method (e.g. cell culture).  The  of the Respiratory Viral Panel has   recommmended that specimens found to be negative for  Adenovirus be confirmed by an alternative method.  The  of the Respiratory Viral Panel has  recommended that specimens found to be negative for   Influenza be confirmed by an alternative method.          Enterovirus Not Detected     Comment: Cross-reactivity has been observed between certain Rhinovirus  strains and the Enterovirus assay.          Human Bocavirus Not Detected     Human Coronavirus Not Detected     Comment: The Human Coronavirus assay detects Human coronavirus types  229E, OC43,NL63 and HKO1.          RVP - Human Metapneumovirus (hMPV) Not Detected     RVP - Influenza A Not Detected     Influenza A - M5L0-80 Not Detected     RVP - Influenza B Not Detected     Parainfluenza Not Detected     Respiratory Syncytial VirusVirus (RSV) A Not Detected     Comment: The Respiratory Syncytial Viral assay detects types A and B,  however it does not distinguish between the two.          RVP - Rhinovirus Not Detected     Comment: Target Enriched Mulitplex Polymerase Chain Reaction (TEM-PCR)  allows for the detection of multiple pathogens out of a single  reaction.  This test was developed and its performance   characteristics determined by [employer].  It has not   been cleared or approved by the U.S.Food and Drug Administration.  Results should be used in conjunction with clinical findings,   and should not form the sole basis for a diagnosis or treatment  decision.  TEM-PCR is a licensed technology of [employer].         Narrative:       Receiving Lab:->[facility]          I have reviewed all pertinent labs within the past 24 hours.    Estimated Creatinine Clearance: 28.8 mL/min (based on SCr of 3.4 mg/dL  (H)).    Diagnostic Results:  I have reviewed all pertinent imaging results/findings within the past 24 hours.    Assessment and Plan:       Lv Crocker is a  42 yo  male s/p OHTX 11/14, complicated by AMR requiring intensive immunosuppression subsequent to which he developed pancytopenia and resolution with continued recurrent ascites requiring scheduled monthly paracentesis presenting with 10 days complain of cough and pleurisy. Patient has been visiting urgent care with similar symptoms for the last three days and romero including CXR, blood cultures as well as urine culture has been negative. He was prescribed inhaler for possible URI and discharged two days ago. Today, he came to the ER with Fever of 103 and generalized myalgia. he report dry cough, constant throughout the day and sometimes causing him to vomit after an episode. He denies sick contact, hemoptysis or recent travel. He did get both influenza and pneumonia immunization. His last paracentesis was three weeks ago without any complication to the site or internally. He denies weight gain, leg edema, PND, orthopnea or AGUILAR. He also denies dysuria, hematuria, sinus tenderness or drainage. He had an XR yesterday that showed persistent R-sided ascites without without clear infiltrate. He reports compliant with current OHT regimen and that his prograf was changed to 3:3 dose three weeks ago.    * Cough in adult    - consult Transplant ID for assistance  - Pan culture including respiratory panel, flu and strep -yana, CMV pending.   - Bcx NGTD  - CXR + for recollection to the R-  - CT chest w/ Moderate volume right pleural fluid that is worsening (chronic).  Bilateral diffuse patchy consolidation and groundglass densities  - DDx: Pneumonia   - Thoracentesis earlier this week. Appreciate help of Pulmonary    - Paracentesis yesterday -2300 ml.  - No symptoms of UTI, however GNR, Klebsiella pneumonia> 100,000 in urine (do not need to treat per ID)  - Cough much  improved by promethazine-codeine  - plan for 7 day course of abx (continue azithromycin and cefepime for now) d/c on levofloxacin. End date of abx = 12/18/17.        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  - Monthly scheduled paracentesis. Last one three weeks ago. Consulted IR, appreciate assistance. Successful -2.6 L  - Hold demedex per nephrology request. Per patient he never skips his demedex at home        Type 1 diabetes mellitus with hyperglycemia    - On Humalog BID at home  - Adjusted due to hyperglycemia yesterday  - Monitor closely and adjust as needed        Chronic kidney disease (CKD), stage III (moderate)    - CACHORRO on CKD. Baseline creat is ~2.2 (up to 3.4 today), appreciate neph consult  - renal dose abx and DVT prophylaxis  - monitor closely during stay        Heart transplanted    - S/P OHTx 11/8/14 (high risk)   - history of AMR treated with PP and IVIG 4/2016   - Biopsy 9/12/16 negative for rejection   - [facility] 2/10/16 no CAV  - Prograf recently increased to 3/3 (dose on admission), continue pred 2.5 and Myfortic 720 BID  - Prograf goal 6-8, 6.3 today. Will give another 2 tonight and restart 3/2 tomorrow. Can adjust tomorrow after TAC level results   - per patient his Cr bumps historically when his TAC is high. Consulted Nephrology, believe he is intravascularly dry. Advised to give fluids, was given 50cc/hr for 10 hours x 2 over 48 hours.  - Repeat echo w/ preserved EF. Patient has known (chronic) restriction since transplant            Kelsea Ryan PA-C  Heart Transplant  [facility]

## 2017-12-15 NOTE — ASSESSMENT & PLAN NOTE
- CACHORRO on CKD. Baseline creat is ~2.2 (up to 3.4 today), appreciate neph consult  - renal dose abx and DVT prophylaxis  - monitor closely during stay

## 2017-12-15 NOTE — PROGRESS NOTES
"[facility]  Nephrology  Progress Note    Patient Name: Lv Crocker  MRN: [id number]  Admission Date: 12/11/2017  Hospital Length of Stay: 4 days  Attending Provider: Jose A Lloyd MD   Primary Care Physician: Philippe Mohr MD  Principal Problem:Cough in adult    Subjective:     HPI: Mr. Crocker is a 43 yo male with PMHx of CAD, Depression, DM, GERD, Hypothyroidism, HLD, s/p OHTx on 11/14 (complicated by AMR requiring intensive immunosuppressant therapy), and CKD III (baseline ~ 2.4) who presented to the hospital on 12/11 with chief complaint of dry cough, pleurisy and fever with TMax of 103 x 3-4 days.  CXR upon admission with patchy opacities bilaterally and treatment was started for pneumonia.  He denied any N/V/D and reports adequate PO intake prior to admission as well as compliance with outpatient medication regimen.  Denies use of any OTC medications.  Normally followed by his outpatient Nephrologist in [place] and currently prescribed Torsemide 40 mg QD for HF.  Recent ECHO completed on 12/12 revealed a normal EF with no diastolic dysfunction and right arterial pressure ~ 8 with pHTN.  He reports adequate UOP, but has noticed lately that the color has now become darker in color.  He denies any dysuria/pyuria/ or foamy urine.  Kidney function upon admission at 2.4 with a rise in SCr to 2.9 on the 13th with another rise on the 14th to 3.5. Noted that on the 11th patients HR increased to 128 with BP reading of 90/50 along with another episode of hypotension on the 14th with a decrease in BP to 80/44 and increase in HR to 137.  He also is currently being treated for a UTI (Klebsiella Pneumoniae).    He underwent a thoracentesis on 12/14 with 1.3L of fluid removed, reported as being "cloudy", with cultures still pending at time of consultation.  He usually requires monthly scheduled paracentesis with his last one at the end of November, and reports they had around 5L removed " "at that time. Currently he has ascites and feels that is "almost time" to have another para.      Interval History:   Underwent paracentesis yesterday with 1.6L of fluid removed.  He voices that he feels "better today", no complaints on examination.  Torsemide dose given in the AM, currently on hold.  He was started on gentle hydration last night.  SCr this morning with slight improvement (down to 3.4 from 3.6), other electrolytes stable.  Urinary sodium <20 despite being on diuretics.    Review of patient's allergies indicates:   Allergen Reactions    No known drug allergies      Current Facility-Administered Medications   Medication Frequency    0.9%  NaCl infusion Continuous    albuterol inhaler 2 puff Q6H PRN    aspirin EC tablet 81 mg Daily    azithromycin 500 mg in dextrose 5 % 250 mL IVPB (ready to mix system) Q24H    cefepime in dextrose 5 % 1 gram/50 mL IVPB 1 g Q12H    dextrose 50% injection 12.5 g PRN    dextrose 50% injection 25 g PRN    escitalopram oxalate tablet 20 mg Daily    fluticasone 50 mcg/actuation nasal spray 2 spray Daily    gabapentin capsule 900 mg TID    glucagon (human recombinant) injection 1 mg PRN    glucose chewable tablet 16 g PRN    glucose chewable tablet 24 g PRN    guaiFENesin 12 hr tablet 600 mg BID    heparin (porcine) injection 5,000 Units Q8H    insulin aspart pen 0-5 Units QID (AC + HS) PRN    insulin aspart pen 5 Units TIDWM    levothyroxine tablet 150 mcg Before breakfast    lidocaine HCL 10 mg/ml (1%) injection 10 mL Once    magnesium oxide tablet 400 mg Daily    mycophenolate EC tablet 720 mg BID    nortriptyline capsule 25 mg QHS    ondansetron disintegrating tablet 8 mg Q8H PRN    pantoprazole EC tablet 40 mg Daily    pravastatin tablet 40 mg QHS    predniSONE tablet 2.5 mg Daily    promethazine-codeine 6.25-10 mg/5 ml syrup 5 mL Q6H PRN    sodium chloride 0.9% flush 3 mL Q8H    [START ON 12/16/2017] tacrolimus capsule 2 mg Daily    " tacrolimus capsule 3 mg Daily    tamsulosin 24 hr capsule 0.4 mg Daily       Objective:     Vital Signs (Most Recent):  Temp: 97.9 °F (36.6 °C) (12/15/17 0749)  Pulse: 96 (12/15/17 1240)  Resp: 17 (12/15/17 1240)  BP: (!) 105/57 (12/15/17 1240)  SpO2: 96 % (12/15/17 1240)  O2 Device (Oxygen Therapy): room air (12/15/17 1240) Vital Signs (24h Range):  Temp:  [97.6 °F (36.4 °C)-98.8 °F (37.1 °C)] 97.9 °F (36.6 °C)  Pulse:  [88-99] 96  Resp:  [16-18] 17  SpO2:  [94 %-97 %] 96 %  BP: ()/(47-72) 105/57     Weight: 84.7 kg (186 lb 11 oz) (12/14/17 2018)  Body mass index is 29.24 kg/m².  Body surface area is 2 meters squared.    I/O last 3 completed shifts:  In: 850 [P.O.:500; IV Piggyback:350]  Out: 3320 [Urine:720; Other:2600]    Physical Exam   Constitutional: He is oriented to person, place, and time. He appears well-developed and well-nourished. No distress.   HENT:   Head: Normocephalic and atraumatic.   Right Ear: External ear normal.   Left Ear: External ear normal.   Eyes: Conjunctivae and EOM are normal. Right eye exhibits no discharge. Left eye exhibits no discharge.   Neck: Normal range of motion. Neck supple. No JVD present.   Cardiovascular: Normal heart sounds and intact distal pulses.  Exam reveals no gallop and no friction rub.    No murmur heard.  Pulmonary/Chest: Effort normal. No respiratory distress. He has decreased breath sounds in the right upper field and the right middle field. He has no wheezes. He has rales (R base).   Abdominal: Soft. Bowel sounds are normal. He exhibits no distension. There is no tenderness.   Musculoskeletal: Normal range of motion. He exhibits no edema or deformity.   Neurological: He is alert and oriented to person, place, and time.   Skin: Skin is warm and dry. No rash noted. He is not diaphoretic. No erythema.   Psychiatric: He has a normal mood and affect. His behavior is normal.       Significant Labs:  CBC:   Recent Labs  Lab 12/15/17  0556   WBC 4.28   RBC  2.91*   HGB 9.0*   HCT 26.3*      MCV 90   MCH 30.9   MCHC 34.2     CMP:   Recent Labs  Lab 12/15/17  0555   *   CALCIUM 8.3*   ALBUMIN 2.5*   PROT 5.5*   *   K 4.5   CO2 23   CL 97   BUN 62*   CREATININE 3.4*   ALKPHOS 195*   ALT 12   AST 21   BILITOT 0.6            Assessment/Plan:     Chronic kidney disease (CKD), stage III (moderate)    Non-Oliguric CACHORRO on CKD III  -Baseline SCr ~2.4, followed by outpatient Nephrology in [place].  Upon admission on the 12th, appears his Kidney function was at baseline.  He is prescribed Torsemide 40 mg QD at home, which he reports compliance with, and has continued since admission to [facility].  He reports UOP has been adequate, although not accurately reported in chart.  He has also had episodes of hypotension with associated tachycardia during this times.  Most recent ECHO on the 12th with elevated PA pressures, but with pEF and RAP of ~ 8.     -current CACHORRO appears to be 2/2 intravascular volume depletion.  Can not rule out a component of ATN from hemodynamic changes related to tachycardia and hypotension.  -Urine Sodium < 20, which is extremely low considering patient was on diuretics.  Indicative of renal hypoperfusion.  -recommend to continue holding torsemide.  Continue current IV hydration.  -will continue following.  Please call with any questions.              Juaquin Ibrahim NP  Nephrology  [facility]  Pager:  [phone]

## 2017-12-15 NOTE — ASSESSMENT & PLAN NOTE
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  - Monthly scheduled paracentesis. Last one three weeks ago. Consulted IR, appreciate assistance. Successful -2.6 L  - Hold demedex per nephrology request. Per patient he never skips his demedex at home

## 2017-12-15 NOTE — ASSESSMENT & PLAN NOTE
- S/P OHTx 11/8/14 (high risk)   - history of AMR treated with PP and IVIG 4/2016   - Biopsy 9/12/16 negative for rejection   - [facility] 2/10/16 no CAV  - Prograf recently increased to 3/3 (dose on admission), continue pred 2.5 and Myfortic 720 BID  - Prograf goal 6-8, 6.3 today. Will give another 2 tonight and restart 3/2 tomorrow. Can adjust tomorrow after TAC level results   - per patient his Cr bumps historically when his TAC is high. Consulted Nephrology, believe he is intravascularly dry. Advised to give fluids, was given 50cc/hr for 10 hours x 2 over 48 hours.  - Repeat echo w/ preserved EF. Patient has known (chronic) restriction since transplant

## 2017-12-15 NOTE — PLAN OF CARE
Problem: Patient Care Overview  Goal: Plan of Care Review  Outcome: Ongoing (interventions implemented as appropriate)  Pt AAOx4. All vital signs stable. Pt denies the presence of pain throughout shift. Pt remains free from falls and injuries throughout shift. Bed in lowest and locked position. Call bell and personal items within reach of pt. IV antibiotics continued, pt tolerating well. Pt restarted on continuous IV fluids at 50 mL/hr. Possible discharge tomorrow. No other changes. Will continue to monitor pt.

## 2017-12-15 NOTE — ASSESSMENT & PLAN NOTE
Non-Oliguric CACHORRO on CKD III  -Baseline SCr ~2.4, followed by outpatient Nephrology in [place].  Upon admission on the 12th, appears his Kidney function was at baseline.  He is prescribed Torsemide 40 mg QD at home, which he reports compliance with, and has continued since admission to [facility].  He reports UOP has been adequate, although not accurately reported in chart.  He has also had episodes of hypotension with associated tachycardia during this times.  Most recent ECHO on the 12th with elevated PA pressures, but with pEF and RAP of ~ 8.     -current CACHORRO appears to be 2/2 intravascular volume depletion.  Can not rule out a component of ATN from hemodynamic changes related to tachycardia and hypotension.  -Urine Sodium < 20, which is extremely low considering patient was on diuretics.  Indicative of renal hypoperfusion.  -recommend to continue holding torsemide.  Continue current IV hydration.  -will continue following.  Please call with any questions.

## 2017-12-15 NOTE — NURSING
Rounds Report: Attended interdisciplinary rounds this morning with the transplant team including SW, physicians, fellows,  mid-level providers, and transplant coordinators.  Discussed plan of care, including DC date possibly tomorrow.Creatinine still increase to 3.2, tacrolimus below goal today. If discharged today, will need labs next week.

## 2017-12-15 NOTE — ASSESSMENT & PLAN NOTE
- consult Transplant ID for assistance  - Pan culture including respiratory panel, flu and strep -yana, CMV pending.   - Bcx NGTD  - CXR + for recollection to the R-  - CT chest w/ Moderate volume right pleural fluid that is worsening (chronic).  Bilateral diffuse patchy consolidation and groundglass densities  - DDx: Pneumonia   - Thoracentesis earlier this week. Appreciate help of Pulmonary    - Paracentesis yesterday -2300 ml.  - No symptoms of UTI, however GNR, Klebsiella pneumonia> 100,000 in urine (do not need to treat per ID)  - Cough much improved by promethazine-codeine  - plan for 7 day course of abx (continue azithromycin and cefepime for now) d/c on levofloxacin. End date of abx = 12/18/17.

## 2017-12-15 NOTE — NURSING
Pt AAOx4. VSS at this time.  Pt denies the presence of pain throughout shift. Pt remains free from falls and injuries throughout shift. Bed in lowest and locked position. Call bell and personal items within reach of pt. IV antibiotics continued, pt tolerating well. Pt being stated on continuous IV fluids at 50 mL/hr. Strict intake and output. No other needs or wantsa this time. Bedside table and call light is within reach. Will continue to monitor.

## 2017-12-15 NOTE — PROGRESS NOTES
D/C NOTE:    SW followed up with pt at bedside. Pt alone in room today. Pt reports he is tentatively scheduled for d/c tomorrow, no needs anticipated. Pt stated he and his wife will be  22yrs tomorrow and he is eager to get home and celebrate with her. No concerns voiced, pt coping adequately. SW remains available for any further education, resources or dc needs that should arise.

## 2017-12-15 NOTE — ASSESSMENT & PLAN NOTE
- On Humalog BID at home  - Adjusted due to hyperglycemia yesterday  - Monitor closely and adjust as needed

## 2017-12-16 NOTE — ASSESSMENT & PLAN NOTE
- Restarted full PTA regimen (basal/meal coverage/SSI)   - BS have not been well controlled while admitted.

## 2017-12-16 NOTE — ASSESSMENT & PLAN NOTE
- CACHORRO on CKD 3  - Baseline creat is ~2.2   - Cr improved to 3.0 today   - Appreciate neprhology input. They suspect relative hypovolemia or ATN   - Repeat BMP on Monday   - K just over 5 this AM but with no EKG changes

## 2017-12-16 NOTE — PLAN OF CARE
Problem: Diabetes, Type 1 (Adult)  Goal: Signs and Symptoms of Listed Potential Problems Will be Absent, Minimized or Managed (Diabetes, Type 1)  Signs and symptoms of listed potential problems will be absent, minimized or managed by discharge/transition of care (reference Diabetes, Type 1 (Adult) CPG).   Outcome: Ongoing (interventions implemented as appropriate)   12/16/17 0310   Diabetes, Type 1   Problems Assessed (Type 1 Diabetes) all   Problems Present (Type 1 Diabetes) hyperglycemia       Problem: Fall Risk (Adult)  Intervention: Safety Precautions   12/16/17 0156   Safety Interventions   Safety Precautions emergency equipment at bedside       Goal: Absence of Falls  Patient will demonstrate the desired outcomes by discharge/transition of care.   Outcome: Ongoing (interventions implemented as appropriate)   12/16/17 0310   Fall Risk (Adult)   Absence of Falls achieves outcome       Problem: Patient Care Overview  Goal: Plan of Care Review  Outcome: Ongoing (interventions implemented as appropriate)   12/16/17 0310   Coping/Psychosocial   Plan Of Care Reviewed With patient     Assumed care of patient at 1900. VSS. Denies pain or any concerns. No acute changes. Patient states he is ready to go home. Wife and son at bedside. Will continue to monitor.

## 2017-12-16 NOTE — SUBJECTIVE & OBJECTIVE
Interval History: Remains afebrile with steady improvement in cough. Renal function marginally better this AM. Re-started his basal insulin regimen (was held on admission?) as his BS have been 200-300 since admission.     Continuous Infusions:    Scheduled Meds:   aspirin  81 mg Oral Daily    azithromycin  500 mg Intravenous Q24H    ceFEPime (MAXIPIME) IVPB  1 g Intravenous Q12H    escitalopram oxalate  20 mg Oral Daily    fluticasone  2 spray Each Nare Daily    gabapentin  900 mg Oral TID    guaiFENesin  600 mg Oral BID    heparin (porcine)  5,000 Units Subcutaneous Q8H    insulin aspart  5 Units Subcutaneous TIDWM    insulin detemir  20 Units Subcutaneous BID    levothyroxine  150 mcg Oral Before breakfast    lidocaine HCL 10 mg/ml (1%)  10 mL Other Once    magnesium oxide  400 mg Oral Daily    mycophenolate  720 mg Oral BID    nortriptyline  25 mg Oral QHS    pantoprazole  40 mg Oral Daily    pravastatin  40 mg Oral QHS    predniSONE  2.5 mg Oral Daily    sodium chloride 0.9%  3 mL Intravenous Q8H    tacrolimus  2 mg Oral Daily    tacrolimus  3 mg Oral Daily    tamsulosin  0.4 mg Oral Daily     PRN Meds:albuterol, dextrose 50%, dextrose 50%, glucagon (human recombinant), glucose, glucose, insulin aspart, ondansetron, promethazine-codeine 6.25-10 mg/5 ml    Review of patient's allergies indicates:   Allergen Reactions    No known drug allergies      Objective:     Vital Signs (Most Recent):  Temp: 97.8 °F (36.6 °C) (12/16/17 1133)  Pulse: 96 (12/16/17 1133)  Resp: 18 (12/16/17 1133)  BP: (!) 97/52 (12/16/17 1133)  SpO2: 96 % (12/16/17 1133) Vital Signs (24h Range):  Temp:  [97.8 °F (36.6 °C)-98.5 °F (36.9 °C)] 97.8 °F (36.6 °C)  Pulse:  [] 96  Resp:  [15-18] 18  SpO2:  [93 %-98 %] 96 %  BP: ()/(52-67) 97/52     Patient Vitals for the past 72 hrs (Last 3 readings):   Weight   12/16/17 0500 85.8 kg (189 lb 2.5 oz)   12/14/17 2018 84.7 kg (186 lb 11 oz)     Body mass index is 29.63  kg/m².      Intake/Output Summary (Last 24 hours) at 12/16/17 1528  Last data filed at 12/16/17 0930   Gross per 24 hour   Intake             1420 ml   Output              750 ml   Net              670 ml     Hemodynamic Parameters:    Telemetry: Sinus tach    Physical Exam  Constitutional: NAD, conversant  HEENT: Sclera anicteric, PERRLA, EOMI  Neck: Unable to visualize JVD, no carotid bruits  CV: Tachy, no murmur, normal S1/S2  Pulm: some bronchial breath sounds in right base, no crackles or wheezes   GI: Abdomen soft, distended, NTTP, +BS. No flank TTP.  Extremities: trace edema, right>left  warm and well perfused  Skin: No ecchymosis, erythema, or ulcers  Psych: AOx3, appropriate affect    Significant Labs:  CBC:    Recent Labs  Lab 12/14/17  0353 12/15/17  0556 12/16/17  0408   WBC 5.29 4.28 4.47   RBC 2.90* 2.91* 2.91*   HGB 8.8* 9.0* 8.9*   HCT 26.4* 26.3* 26.4*    195 213   MCV 91 90 91   MCH 30.3 30.9 30.6   MCHC 33.3 34.2 33.7     BNP:    Recent Labs  Lab 12/11/17  1750   *     CMP:    Recent Labs  Lab 12/14/17  0353 12/14/17  1159 12/15/17  0555 12/16/17  0408    289* 236* 356*   CALCIUM 8.6* 8.7 8.3* 8.6*   ALBUMIN 2.6*  --  2.5* 2.4*   PROT 5.8*  --  5.5* 5.4*   * 132* 132* 131*   K 4.6 4.4 4.5 5.4*   CO2 27 27 23 26   CL 97 94* 97 97   BUN 56* 58* 62* 61*   CREATININE 3.5* 3.6* 3.4* 3.0*   ALKPHOS 194*  --  195* 188*   ALT 13  --  12 15   AST 20  --  21 20   BILITOT 0.7  --  0.6 0.5      I have reviewed all pertinent labs within the past 24 hours.    Estimated Creatinine Clearance: 32.9 mL/min (based on SCr of 3 mg/dL (H)).    Diagnostic Results:  I have reviewed all pertinent imaging results/findings within the past 24 hours.

## 2017-12-16 NOTE — ASSESSMENT & PLAN NOTE
S/P OHTx 11/8/14 (high risk)  - history of AMR treated with PP and IVIG 4/2016  - Biopsy 9/12/16 negative for rejection  - [facility] 2/10/16 no CAV  - Continue Prograf 3/3, continue pred 2.5 and Myfortic 720 BID  - Prograf goal 6-8 with level this AM = 5.5  - Repeat BMP / [place] level on Monday

## 2017-12-16 NOTE — PLAN OF CARE
Problem: Patient Care Overview  Goal: Plan of Care Review  Outcome: Outcome(s) achieved Date Met: 12/16/17  Pt aaox4 through shift, AVSS on RA, no c/o pain, tele monitor on. Up ad linette, no deficits. accuchecks achs maintained. BG >400 this AM, detemir ordered and BG trending down through shift. PIV removed tip in tact, discharge and medication instructions reviewed at bedside with pt and pt's family, all questions answered. Ambulating independently downstairs for discharge home with family and no needs.

## 2017-12-16 NOTE — DISCHARGE SUMMARY
[facility]  Cardiology  Discharge Summary  Patient Name: Lv Crocker  MRN: [id number]  Admission Date: 12/11/2017  Hospital Length of Stay: 5 days  Discharge Date and Time:  12/16/2017 3:38 PM  Attending Physician: Jose A Lloyd MD  Discharging Provider: Mamadou rhodes DO  Primary Care Physician: Philippe Mohr MD    HPI: Lv Crocker is a  42 yo  male s/p OHTX 11/14, complicated by AMR requiring intensive immunosuppression subsequent to which he developed pancytopenia and resolution with continued recurrent ascites requiring scheduled monthly paracentesis presenting with 10 days complain of cough and pleurisy. Patient has been visiting urgent care with similar symptoms for the last three days and romero including CXR, blood cultures as well as urine culture has been negative. He was prescribed inhaler for possible URI and discharged two days ago. Today, he came to the ER with Fever of 103 and generalized myalgia. he report dry cough, constant throughout the day and sometimes causing him to vomit after an episode. He denies sick contact, hemoptysis or recent travel. He did get both influenza and pneumonia immunization. His last paracentesis was three weeks ago without any complication to the site or internally. He denies weight gain, leg edema, PND, orthopnea or AGUILAR. He also denies dysuria, hematuria, sinus tenderness or drainage. He had an XR yesterday that showed persistent R-sided ascites without without clear infiltrate. He reports compliant with current OHT regimen and that his prograf was changed to 3:3 dose three weeks ago.    * No surgery found *     Indwelling Lines/Drains at time of discharge:  Lines/Drains/Airways          No matching active lines, drains, or airways        Hospital Course (synopsis of major diagnoses, care, treatment, and services provided during the course of the hospital stay): Mr. Crocker was admitted for likely community acquired pneumonia and was  treated with IV ABX (azithromycin / cefepime) for 5 days to complete 2 additional days with levofloxacin. ABX choice and duration guided by transplant ID team. Hospitalization complicated by CACHORRO that was thought to be due to either hypovolemia/ATN or mildly supratherapeutic tacrolimus. He was seen by nephrology who recommended that in the future he receive albumin with his paracentesis regardless of the amount removed. Discharged with plan to follow up in [facility] clinic in a few months.     Consults:   Consults         Status Ordering Provider     Inpatient consult to Infectious Diseases  Once     Provider:  (Not yet assigned)    Completed ERNESTO ALSTON     Inpatient consult to Infectious Diseases  Once     Provider:  (Not yet assigned)    Completed CORTNEY LATIF     Inpatient consult to Interventional Radiology  Once     Provider:  (Not yet assigned)    Completed CORTNEY LATIF     Inpatient consult to Nephrology  Once     Provider:  (Not yet assigned)    Completed CORTNEY LATIF     Inpatient consult to Pulmonology  Once     Provider:  (Not yet assigned)    Completed MISTI ANTNOY        Significant Diagnostic Studies: Labs:   BMP:   Recent Labs  Lab 12/15/17  0555 12/16/17  0408   * 356*   * 131*   K 4.5 5.4*   CL 97 97   CO2 23 26   BUN 62* 61*   CREATININE 3.4* 3.0*   CALCIUM 8.3* 8.6*   MG  --  2.2   , CMP   Recent Labs  Lab 12/15/17  0555 12/16/17  0408   * 131*   K 4.5 5.4*   CL 97 97   CO2 23 26   * 356*   BUN 62* 61*   CREATININE 3.4* 3.0*   CALCIUM 8.3* 8.6*   PROT 5.5* 5.4*   ALBUMIN 2.5* 2.4*   BILITOT 0.6 0.5   ALKPHOS 195* 188*   AST 21 20   ALT 12 15   ANIONGAP 12 8   ESTGFRAFRICA 24.0* 27.9*   EGFRNONAA 20.8* 24.1*   , CBC   Recent Labs  Lab 12/15/17  0556 12/16/17  0408   WBC 4.28 4.47   HGB 9.0* 8.9*   HCT 26.3* 26.4*    213    and All labs within the past 24 hours have been reviewed    Pending Diagnostic Studies:     Procedure Component Value  Units Date/Time    Cytology Specimen - Please Refrigerate [[id number]] Collected:  12/13/17 1525    Order Status:  Sent Lab Status:  In process Updated:  12/14/17 1000    Specimen:  Other specimen location (comment)     Legionella antigen, urine [[id number]] Collected:  12/14/17 1142    Order Status:  Sent Lab Status:  In process Updated:  12/14/17 1401    Specimen:  Urine from Urine, Clean Catch         Final Active Diagnoses:    Diagnosis Date Noted POA    PRINCIPAL PROBLEM:  Pneumonia of both lungs due to infectious organism [J18.9] 12/13/2017 Yes    Heart transplanted [Z94.1]  Not Applicable    Pleural effusion [J90] 12/13/2017 Yes    Positive urine culture [R82.79] 12/12/2017 Yes    Fatigue [R53.83] 12/11/2017 Yes    Other ascites [R18.8] 03/31/2017 Yes    Chronic kidney disease (CKD), stage III (moderate) [N18.3] 04/26/2015 Yes    CACHORRO (acute kidney injury) [N17.9] 05/21/2014 Yes    Type 1 diabetes mellitus with hyperglycemia [E10.65] 10/29/2012 Yes      Problems Resolved During this Admission:    Diagnosis Date Noted Date Resolved POA       Discharged Condition: good    Follow Up: HTS (as scheduled)     Patient Instructions:     BASIC METABOLIC PANEL   Standing Status: Future  Standing Exp. Date: 02/14/19     TACROLIMUS LEVEL   Standing Status: Future  Standing Exp. Date: 02/14/19       Medications:  Reconciled Home Medications:   Current Discharge Medication List      START taking these medications    Details   levoFLOXacin (LEVAQUIN) 500 MG tablet Take 1 tablet (500 mg total) by mouth once daily.  Qty: 2 tablet, Refills: 0         CONTINUE these medications which have NOT CHANGED    Details   aspirin (ECOTRIN) 81 MG EC tablet Take 1 tablet (81 mg total) by mouth once daily.  Qty: 30 tablet, Refills: 11      escitalopram oxalate (LEXAPRO) 20 MG tablet Take 1 tablet (20 mg total) by mouth once daily.  Qty: 30 tablet, Refills: 11      fluticasone (FLONASE) 50 mcg/actuation nasal spray 2 sprays by Each  "Nare route once daily.  Qty: 1 Bottle, Refills: 12      gabapentin (NEURONTIN) 300 MG capsule Take 3 capsules (900 mg total) by mouth 3 (three) times daily.  Qty: 270 capsule, Refills: 11    Associated Diagnoses: Status post heart transplant; Acquired hypothyroidism      insulin NPH (NOVOLIN N) 100 unit/mL injection Inject 20 Units into the skin 2 (two) times daily before meals.  Qty: 1 vial, Refills: 0    Associated Diagnoses: Type 1 diabetes mellitus with hyperglycemia      levothyroxine (SYNTHROID) 150 MCG tablet Take 1 tablet (150 mcg total) by mouth every morning.  Qty: 30 tablet, Refills: 12    Associated Diagnoses: Status post heart transplant; Acquired hypothyroidism      magnesium oxide (MAG-OX) 400 mg tablet Take 1 tablet (400 mg total) by mouth once daily.  Qty: 30 tablet, Refills: 11      mycophenolate (MYFORTIC) 180 MG TbEC Take 4 tablets (720 mg total) by mouth 2 (two) times daily. S/P Heart Transplant 11/18/2014 ICD-10 Z94.1  Qty: 240 tablet, Refills: 11    Associated Diagnoses: Status post heart transplant; Long-term use of immunosuppressant medication; Immunosuppression      nortriptyline (PAMELOR) 25 MG capsule Take 1 capsule (25 mg total) by mouth every evening.  Qty: 30 capsule, Refills: 3    Associated Diagnoses: Status post heart transplant; Acquired hypothyroidism      pantoprazole (PROTONIX) 40 MG tablet TAKE ONE TABLET BY MOUTH EVERY DAY  Qty: 30 tablet, Refills: 11      pen needle, diabetic 32 gauge x 5/32" Ndle Uses 5 pen needles a day  Qty: 200 each, Refills: 12      pravastatin (PRAVACHOL) 40 MG tablet Take 1 tablet (40 mg total) by mouth every evening.  Qty: 30 tablet, Refills: 11      predniSONE (DELTASONE) 2.5 MG tablet Take 1 tablet (2.5 mg total) by mouth once daily. S/P Heart Transplant 11/18/2014 ICD-10 Z94.1  Qty: 30 tablet, Refills: 11    Associated Diagnoses: Heart transplanted; Essential hypertension; Chronic kidney disease (CKD), stage III (moderate); Immunosuppression; " Status post heart transplant; Long-term use of immunosuppressant medication      tacrolimus (PROGRAF) 1 MG Cap Taked 3mg orally in the morning and 3mg orally in the evening. S/p heart transplant  11/18/2014  ICD-10 Z94.1  Qty: 180 capsule, Refills: 11    Associated Diagnoses: Status post heart transplant; Long-term use of immunosuppressant medication; Immunosuppression      tamsulosin (FLOMAX) 0.4 mg Cp24 TAKE 2 CAPSULES(0.8 MG) BY MOUTH EVERY EVENING  Qty: 60 capsule, Refills: 1    Associated Diagnoses: Heart transplanted; Essential hypertension; Chronic kidney disease (CKD), stage III (moderate); Immunosuppression      torsemide (DEMADEX) 20 MG Tab Take 2 tablets (40 mg total) by mouth once daily.  Qty: 60 tablet, Refills: 11      albuterol 90 mcg/actuation inhaler Inhale 2 puffs into the lungs every 6 (six) hours as needed for Shortness of Breath.  Qty: 18 g, Refills: 0    Associated Diagnoses: Shortness of breath      glucagon (human recombinant) inj 1mg/mL kit Inject 1 mL (1 mg total) into the muscle as needed.  Qty: 1 kit, Refills: 6    Associated Diagnoses: Type 1 diabetes mellitus with hyperglycemia      lancets Misc 1 Device by Misc.(Non-Drug; Combo Route) route 4 (four) times daily with meals and nightly.  Qty: 100 each, Refills: 5    Comments: ON HOLD, patient will request fill [facility]      ondansetron (ZOFRAN-ODT) 4 MG TbDL Take 2 tablets (8 mg total) by mouth every 8 (eight) hours as needed (nausea).  Qty: 15 tablet, Refills: 0    Associated Diagnoses: Nausea         STOP taking these medications       guaifenesin-codeine 100-10 mg/5 ml (TUSSI-ORGANIDIN NR)  mg/5 mL syrup Comments:   Reason for Stopping:             Time spent on the discharge of patient: 30 minutes    Mamadou rhodes DO  Cardiology  [facility]

## 2017-12-16 NOTE — ASSESSMENT & PLAN NOTE
- Transplant ID following. Recs appreciated.   - Suspect CAP as etiology  - Received cefepime / azithromycin for 5 days while admitted and will finish 2 additional days of renally dosed levofloxacin orally.

## 2017-12-16 NOTE — NURSING
Spoke with MARIA DOLORES ABRAHAM, stated to recheck BG and give scheduled 5 u + 5 u coverage = total of 10u, and recheck in 2 hrs. Will recheck BG @ 1030.

## 2017-12-16 NOTE — PROGRESS NOTES
[facility]  Heart Transplant  Progress Note    Patient Name: Lv Crocker  MRN: [id number]  Admission Date: 12/11/2017  Hospital Length of Stay: 5 days  Attending Physician: Jose A Lloyd MD  Primary Care Provider: Philippe Mohr MD  Principal Problem:Pneumonia of both lungs due to infectious organism    Subjective:     Interval History: Remains afebrile with steady improvement in cough. Renal function marginally better this AM. Re-started his basal insulin regimen (was held on admission?) as his BS have been 200-300 since admission.     Continuous Infusions:    Scheduled Meds:   aspirin  81 mg Oral Daily    azithromycin  500 mg Intravenous Q24H    ceFEPime (MAXIPIME) IVPB  1 g Intravenous Q12H    escitalopram oxalate  20 mg Oral Daily    fluticasone  2 spray Each Nare Daily    gabapentin  900 mg Oral TID    guaiFENesin  600 mg Oral BID    heparin (porcine)  5,000 Units Subcutaneous Q8H    insulin aspart  5 Units Subcutaneous TIDWM    insulin detemir  20 Units Subcutaneous BID    levothyroxine  150 mcg Oral Before breakfast    lidocaine HCL 10 mg/ml (1%)  10 mL Other Once    magnesium oxide  400 mg Oral Daily    mycophenolate  720 mg Oral BID    nortriptyline  25 mg Oral QHS    pantoprazole  40 mg Oral Daily    pravastatin  40 mg Oral QHS    predniSONE  2.5 mg Oral Daily    sodium chloride 0.9%  3 mL Intravenous Q8H    tacrolimus  2 mg Oral Daily    tacrolimus  3 mg Oral Daily    tamsulosin  0.4 mg Oral Daily     PRN Meds:albuterol, dextrose 50%, dextrose 50%, glucagon (human recombinant), glucose, glucose, insulin aspart, ondansetron, promethazine-codeine 6.25-10 mg/5 ml    Review of patient's allergies indicates:   Allergen Reactions    No known drug allergies      Objective:     Vital Signs (Most Recent):  Temp: 97.8 °F (36.6 °C) (12/16/17 1133)  Pulse: 96 (12/16/17 1133)  Resp: 18 (12/16/17 1133)  BP: (!) 97/52 (12/16/17 1133)  SpO2: 96 % (12/16/17 1133)  Vital Signs (24h Range):  Temp:  [97.8 °F (36.6 °C)-98.5 °F (36.9 °C)] 97.8 °F (36.6 °C)  Pulse:  [] 96  Resp:  [15-18] 18  SpO2:  [93 %-98 %] 96 %  BP: ()/(52-67) 97/52     Patient Vitals for the past 72 hrs (Last 3 readings):   Weight   12/16/17 0500 85.8 kg (189 lb 2.5 oz)   12/14/17 2018 84.7 kg (186 lb 11 oz)     Body mass index is 29.63 kg/m².      Intake/Output Summary (Last 24 hours) at 12/16/17 1528  Last data filed at 12/16/17 0930   Gross per 24 hour   Intake             1420 ml   Output              750 ml   Net              670 ml     Hemodynamic Parameters:    Telemetry: Sinus tach    Physical Exam  Constitutional: NAD, conversant  HEENT: Sclera anicteric, PERRLA, EOMI  Neck: Unable to visualize JVD, no carotid bruits  CV: Tachy, no murmur, normal S1/S2  Pulm: some bronchial breath sounds in right base, no crackles or wheezes   GI: Abdomen soft, distended, NTTP, +BS. No flank TTP.  Extremities: trace edema, right>left  warm and well perfused  Skin: No ecchymosis, erythema, or ulcers  Psych: AOx3, appropriate affect    Significant Labs:  CBC:    Recent Labs  Lab 12/14/17  0353 12/15/17  0556 12/16/17  0408   WBC 5.29 4.28 4.47   RBC 2.90* 2.91* 2.91*   HGB 8.8* 9.0* 8.9*   HCT 26.4* 26.3* 26.4*    195 213   MCV 91 90 91   MCH 30.3 30.9 30.6   MCHC 33.3 34.2 33.7     BNP:    Recent Labs  Lab 12/11/17  1750   *     CMP:    Recent Labs  Lab 12/14/17  0353 12/14/17  1159 12/15/17  0555 12/16/17  0408    289* 236* 356*   CALCIUM 8.6* 8.7 8.3* 8.6*   ALBUMIN 2.6*  --  2.5* 2.4*   PROT 5.8*  --  5.5* 5.4*   * 132* 132* 131*   K 4.6 4.4 4.5 5.4*   CO2 27 27 23 26   CL 97 94* 97 97   BUN 56* 58* 62* 61*   CREATININE 3.5* 3.6* 3.4* 3.0*   ALKPHOS 194*  --  195* 188*   ALT 13  --  12 15   AST 20  --  21 20   BILITOT 0.7  --  0.6 0.5      I have reviewed all pertinent labs within the past 24 hours.    Estimated Creatinine Clearance: 32.9 mL/min (based on SCr of 3 mg/dL  (H)).    Diagnostic Results:  I have reviewed all pertinent imaging results/findings within the past 24 hours.    Assessment and Plan:     * Pneumonia of both lungs due to infectious organism    - Transplant ID following. Recs appreciated.   - Suspect CAP as etiology  - Received cefepime / azithromycin for 5 days while admitted and will finish 2 additional days of renally dosed levofloxacin orally.           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  - Monthly scheduled paracentesis. Last one three weeks ago. Consulted IR, appreciate assistance. Successful -2.6 L  - Hold demedex per nephrology request. Per patient he never skips his demedex at home        Type 1 diabetes mellitus with hyperglycemia    - Restarted full PTA regimen (basal/meal coverage/SSI)   - BS have not been well controlled while admitted.         Chronic kidney disease (CKD), stage III (moderate)             CACHORRO (acute kidney injury)    - CACHORRO on CKD 3  - Baseline creat is ~2.2   - Cr improved to 3.0 today   - Appreciate neprhology input. They suspect relative hypovolemia or ATN   - Repeat BMP on Monday   - K just over 5 this AM but with no EKG changes               Mamadou rhodes, DO  Heart Transplant  [facility]

## 2017-12-19 NOTE — H&P (VIEW-ONLY)
[facility]  Heart Transplant  Progress Note    Patient Name: Lv Crocker  MRN: [id number]  Admission Date: 12/11/2017  Hospital Length of Stay: 5 days  Attending Physician: Jose A Lloyd MD  Primary Care Provider: Philippe Mohr MD  Principal Problem:Pneumonia of both lungs due to infectious organism    Subjective:     Interval History: Remains afebrile with steady improvement in cough. Renal function marginally better this AM. Re-started his basal insulin regimen (was held on admission?) as his BS have been 200-300 since admission.     Continuous Infusions:    Scheduled Meds:   aspirin  81 mg Oral Daily    azithromycin  500 mg Intravenous Q24H    ceFEPime (MAXIPIME) IVPB  1 g Intravenous Q12H    escitalopram oxalate  20 mg Oral Daily    fluticasone  2 spray Each Nare Daily    gabapentin  900 mg Oral TID    guaiFENesin  600 mg Oral BID    heparin (porcine)  5,000 Units Subcutaneous Q8H    insulin aspart  5 Units Subcutaneous TIDWM    insulin detemir  20 Units Subcutaneous BID    levothyroxine  150 mcg Oral Before breakfast    lidocaine HCL 10 mg/ml (1%)  10 mL Other Once    magnesium oxide  400 mg Oral Daily    mycophenolate  720 mg Oral BID    nortriptyline  25 mg Oral QHS    pantoprazole  40 mg Oral Daily    pravastatin  40 mg Oral QHS    predniSONE  2.5 mg Oral Daily    sodium chloride 0.9%  3 mL Intravenous Q8H    tacrolimus  2 mg Oral Daily    tacrolimus  3 mg Oral Daily    tamsulosin  0.4 mg Oral Daily     PRN Meds:albuterol, dextrose 50%, dextrose 50%, glucagon (human recombinant), glucose, glucose, insulin aspart, ondansetron, promethazine-codeine 6.25-10 mg/5 ml    Review of patient's allergies indicates:   Allergen Reactions    No known drug allergies      Objective:     Vital Signs (Most Recent):  Temp: 97.8 °F (36.6 °C) (12/16/17 1133)  Pulse: 96 (12/16/17 1133)  Resp: 18 (12/16/17 1133)  BP: (!) 97/52 (12/16/17 1133)  SpO2: 96 % (12/16/17 1133)  Vital Signs (24h Range):  Temp:  [97.8 °F (36.6 °C)-98.5 °F (36.9 °C)] 97.8 °F (36.6 °C)  Pulse:  [] 96  Resp:  [15-18] 18  SpO2:  [93 %-98 %] 96 %  BP: ()/(52-67) 97/52     Patient Vitals for the past 72 hrs (Last 3 readings):   Weight   12/16/17 0500 85.8 kg (189 lb 2.5 oz)   12/14/17 2018 84.7 kg (186 lb 11 oz)     Body mass index is 29.63 kg/m².      Intake/Output Summary (Last 24 hours) at 12/16/17 1528  Last data filed at 12/16/17 0930   Gross per 24 hour   Intake             1420 ml   Output              750 ml   Net              670 ml     Hemodynamic Parameters:    Telemetry: Sinus tach    Physical Exam  Constitutional: NAD, conversant  HEENT: Sclera anicteric, PERRLA, EOMI  Neck: Unable to visualize JVD, no carotid bruits  CV: Tachy, no murmur, normal S1/S2  Pulm: some bronchial breath sounds in right base, no crackles or wheezes   GI: Abdomen soft, distended, NTTP, +BS. No flank TTP.  Extremities: trace edema, right>left  warm and well perfused  Skin: No ecchymosis, erythema, or ulcers  Psych: AOx3, appropriate affect    Significant Labs:  CBC:    Recent Labs  Lab 12/14/17  0353 12/15/17  0556 12/16/17  0408   WBC 5.29 4.28 4.47   RBC 2.90* 2.91* 2.91*   HGB 8.8* 9.0* 8.9*   HCT 26.4* 26.3* 26.4*    195 213   MCV 91 90 91   MCH 30.3 30.9 30.6   MCHC 33.3 34.2 33.7     BNP:    Recent Labs  Lab 12/11/17  1750   *     CMP:    Recent Labs  Lab 12/14/17  0353 12/14/17  1159 12/15/17  0555 12/16/17  0408    289* 236* 356*   CALCIUM 8.6* 8.7 8.3* 8.6*   ALBUMIN 2.6*  --  2.5* 2.4*   PROT 5.8*  --  5.5* 5.4*   * 132* 132* 131*   K 4.6 4.4 4.5 5.4*   CO2 27 27 23 26   CL 97 94* 97 97   BUN 56* 58* 62* 61*   CREATININE 3.5* 3.6* 3.4* 3.0*   ALKPHOS 194*  --  195* 188*   ALT 13  --  12 15   AST 20  --  21 20   BILITOT 0.7  --  0.6 0.5      I have reviewed all pertinent labs within the past 24 hours.    Estimated Creatinine Clearance: 32.9 mL/min (based on SCr of 3 mg/dL  (H)).    Diagnostic Results:  I have reviewed all pertinent imaging results/findings within the past 24 hours.    Assessment and Plan:     * Pneumonia of both lungs due to infectious organism    - Transplant ID following. Recs appreciated.   - Suspect CAP as etiology  - Received cefepime / azithromycin for 5 days while admitted and will finish 2 additional days of renally dosed levofloxacin orally.           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  - Monthly scheduled paracentesis. Last one three weeks ago. Consulted IR, appreciate assistance. Successful -2.6 L  - Hold demedex per nephrology request. Per patient he never skips his demedex at home        Type 1 diabetes mellitus with hyperglycemia    - Restarted full PTA regimen (basal/meal coverage/SSI)   - BS have not been well controlled while admitted.         Chronic kidney disease (CKD), stage III (moderate)             CACHORRO (acute kidney injury)    - CACHORRO on CKD 3  - Baseline creat is ~2.2   - Cr improved to 3.0 today   - Appreciate neprhology input. They suspect relative hypovolemia or ATN   - Repeat BMP on Monday   - K just over 5 this AM but with no EKG changes               Mamadou rhodes, DO  Heart Transplant  [facility]

## 2017-12-19 NOTE — PROGRESS NOTES
D/c  Instructions explained and copy given to patient. Pt verbalized understanding and denies questions. Pt wheeled to personal vehicle driven by son.

## 2017-12-19 NOTE — PATIENT INSTRUCTIONS
Pneumonia (Adult)  Pneumonia is an infection deep within the lungs. It is in the small air sacs (alveoli). Pneumonia may be caused by a virus or bacteria. Pneumonia caused by bacteria is usually treated with an antibiotic. Severe cases may need to be treated in the hospital. Milder cases can be treated at home. Symptoms usually start to get better during the first 2 days of treatment.    Home care  Follow these guidelines when caring for yourself at home:  · Rest at home for the first 2 to 3 days, or until you feel stronger. Dont let yourself get overly tired when you go back to your activities.  · Stay away from cigarette smoke - yours or other peoples.  · You may use acetaminophen or ibuprofen to control fever or pain, unless another medicine was prescribed. If you have chronic liver or kidney disease, talk with your healthcare provider before using these medicines. Also talk with your provider if youve had a stomach ulcer or gastrointestinal bleeding. Dont give aspirin to anyone younger than 18 years of age who is ill with a fever. It may cause severe liver damage.  · Your appetite may be poor, so a light diet is fine.  · Drink 6 to 8 glasses of fluids every day to make sure you are getting enough fluids. Beverages can include water, sport drinks, sodas without caffeine, juices, tea, or soup. Fluids will help loosen secretions in the lung. This will make it easier for you to cough up the phlegm (sputum). If you also have heart or kidney disease, check with your healthcare provider before you drink extra fluids.  · Take antibiotic medicine prescribed until it is all gone, even if you are feeling better after a few days.  Follow-up care  Follow up with your healthcare provider in the next 2 to 3 days, or as advised. This is to be sure the medicine is helping you get better.  If you are 65 or older, you should get a pneumococcal vaccine and a yearly flu (influenza) shot. You should also get these vaccines if  you have chronic lung disease like asthma, emphysema, or COPD. Recently, a second type of pneumonia vaccine has become available for everyone over 65 years old. This is in addition to the previous vaccine. Ask your provider about this.  When to seek medical advice  Call your healthcare provider right away if any of these occur:  · You dont get better within the first 48 hours of treatment  · Shortness of breath gets worse  · Rapid breathing (more than 25 breaths per minute)  · Coughing up blood  · Chest pain gets worse with breathing  · Fever of 100.4°F (38°C) or higher that doesnt get better with fever medicine  · Weakness, dizziness, or fainting that gets worse  · Thirst or dry mouth that gets worse  · Sinus pain, headache, or a stiff neck  · Chest pain not caused by coughing  Date Last Reviewed: 1/1/2017  © 1564-0254 [employer], [employer]. [address] [place] [address]. All rights reserved. This information is not intended as a substitute for professional medical care. Always follow your healthcare professional's instructions.

## 2017-12-19 NOTE — TELEPHONE ENCOUNTER
Spoke to pt's wife concerning pt's SOB, cough and overall not feeling well. Pt feels like his ascites is worse and would like to get it tapped today. Spoke with Dr. Fernandez who would like to see pt in clinic before recommending treatment for pt. Obtained appt with Dr. Turner tomorrow at 2 pm with chest xray before. Advised pt's wife that he worsens over night to take him to ED. She assured me she will.

## 2017-12-19 NOTE — DISCHARGE SUMMARY
Sterile technique was performed in the LLQ, lidocaine was used as a local anesthetic.  2.5 liters of chylous fluid removed for therapeutic purposes.  Pt tolerated the procedure well without immediate complications.  Please see radiologist report for details. F/u with PCP and/or ordering physician.

## 2017-12-19 NOTE — PROGRESS NOTES
Procedure complete. Pt tolerated well, vss. 2.5L chylous fluid removed from abdomen. bandaid placed to left abdomen healthy site. Will cont to monitor

## 2017-12-19 NOTE — DISCHARGE INSTRUCTIONS

## 2017-12-20 NOTE — PROGRESS NOTES
"Subjective:       Patient ID: Lv Crocker is a 42 y.o. White male who presents for follow-up evaluation of  CKD stage 4, HTN, SHPT,     Philippe Mohr MD      HPI : Lv Crocker is a pleasant 42-year-old  gentleman seen in office today in f/u for above medical problems. Hospital records and other provider notes were reviewed.  Patient is heart transplant patient and received transplant about 3 years ago. He was recently admitted at [facility] for pneumonia (December 2017) and CACHORRO. Pneumonia and CACHORRO have now resolved. Patient reports recurring pleural effusions and recurring ascites which requires frequent paracentesis.    He denies any pain, SOB, nausea. LE edema today.         Past Medical History:   Diagnosis Date    Arthritis     CAD (coronary artery disease)     H/o Coronary artery disease; resolved since heart transplant 2014    Cardiomyopathy     CHF (congestive heart failure)     Previously EF 20% (prior to heart transplant); last EF 55-60%; throught to be 2/2 genetics & DM1    Depression     Controlled "for the most part" on Lexipro    Encounter for blood transfusion     during transplant    GERD (gastroesophageal reflux disease)     Hashimoto's disease     HLD (hyperlipidemia) 6/11/2015    Hypoglycemia unawareness in type 1 diabetes mellitus     Hypothyroid     Initially hyperthyroid 2/2 Hoshimoto's thyroiditis; now on levothyroxine 150 mcg qd    Myocardial infarction     h/o MI x3 prior to heart transplant    Syncope 6/30/2015    Type I (juvenile type) diabetes mellitus with unspecified complication, uncontrolled     Controlled; Dx'd @7y/o; on N insulin 20U BID & Humalog 10U w/meals +SSI; Glu 89 11/9/17; A1c 7.2% 4/5/17    Unspecified essential hypertension 05/29/2014    Well controlled; Last /57 on 11/9/17         Current Outpatient Prescriptions on File Prior to Visit   Medication Sig Dispense Refill    albuterol 90 mcg/actuation inhaler Inhale 2 puffs " "into the lungs every 6 (six) hours as needed for Shortness of Breath. 18 g 0    aspirin (ECOTRIN) 81 MG EC tablet Take 1 tablet (81 mg total) by mouth once daily. 30 tablet 11    escitalopram oxalate (LEXAPRO) 20 MG tablet Take 1 tablet (20 mg total) by mouth once daily. 30 tablet 11    fluticasone (FLONASE) 50 mcg/actuation nasal spray 2 sprays by Each Nare route once daily. 1 Bottle 12    gabapentin (NEURONTIN) 300 MG capsule Take 3 capsules (900 mg total) by mouth 3 (three) times daily. 270 capsule 11    glucagon (human recombinant) inj 1mg/mL kit Inject 1 mL (1 mg total) into the muscle as needed. 1 kit 6    insulin NPH (NOVOLIN N) 100 unit/mL injection Inject 20 Units into the skin 2 (two) times daily before meals. 1 vial 0    lancets Misc 1 Device by Misc.(Non-Drug; Combo Route) route 4 (four) times daily with meals and nightly. 100 each 5    levothyroxine (SYNTHROID) 150 MCG tablet Take 1 tablet (150 mcg total) by mouth every morning. 30 tablet 12    magnesium oxide (MAG-OX) 400 mg tablet Take 1 tablet (400 mg total) by mouth once daily. 30 tablet 11    mycophenolate (MYFORTIC) 180 MG TbEC Take 4 tablets (720 mg total) by mouth 2 (two) times daily. S/P Heart Transplant 11/18/2014 ICD-10 Z94.1 240 tablet 11    nortriptyline (PAMELOR) 25 MG capsule Take 1 capsule (25 mg total) by mouth every evening. 30 capsule 3    ondansetron (ZOFRAN-ODT) 4 MG TbDL Take 2 tablets (8 mg total) by mouth every 8 (eight) hours as needed (nausea). 15 tablet 0    pantoprazole (PROTONIX) 40 MG tablet TAKE ONE TABLET BY MOUTH EVERY DAY 30 tablet 11    pen needle, diabetic 32 gauge x 5/32" Ndle Uses 5 pen needles a day 200 each 12    pravastatin (PRAVACHOL) 40 MG tablet Take 1 tablet (40 mg total) by mouth every evening. 30 tablet 11    predniSONE (DELTASONE) 2.5 MG tablet Take 1 tablet (2.5 mg total) by mouth once daily. S/P Heart Transplant 11/18/2014 ICD-10 Z94.1 30 tablet 11    tacrolimus (PROGRAF) 1 MG Cap Taked " "3mg orally in the morning and 3mg orally in the evening. S/p heart transplant  2014  ICD-10 Z94.1 180 capsule 11    tamsulosin (FLOMAX) 0.4 mg Cp24 TAKE 2 CAPSULES(0.8 MG) BY MOUTH EVERY EVENING 60 capsule 1    torsemide (DEMADEX) 20 MG Tab Take 2 tablets (40 mg total) by mouth once daily. 60 tablet 11     No current facility-administered medications on file prior to visit.        FH : Both his parents were on renal replacement therapy before they , end-stage renal disease secondary to diabetes        SH : He is currently living at home with his family, he has 2 sons, currently on disability, he was a teacher by profession in the past, he never smoked or drank alcohol    Review of Systems  :    Constitutional: Negative for fatigue. Negative for appetite change.   HENT: Negative for congestion and facial swelling.   Eyes: Negative for pain, discharge and redness.   Respiratory: Negative for apnea, cough and chest tightness.   Cardiovascular: Negative for chest pain, palpitations and leg swelling.   Gastrointestinal: Negative for abdominal distention.   Genitourinary: Negative for difficulty urinating, dysuria and frequency.   Musculoskeletal: Negative for arthralgias and back pain. Negative for neck pain and neck stiffness.   Skin: Negative for color change, rash and wound.   Neurological: Negative for dizziness, weakness and numbness.   Psychiatric/Behavioral: Negative for sleep disturbance.   All other systems reviewed and are negative.    :        Objective:         BP (!) 80/56   Pulse 84   Ht 5' 7" (1.702 m)   Wt 85.1 kg (187 lb 9.8 oz)   BMI 29.38 kg/m²         Physical Exam  :      Constitutional: He is oriented to person, place, and time. He appears well-developed and well-nourished. No distress.   HENT: Head: Normocephalic and atraumatic.   Eyes: Pupils are equal, round, and reactive to light.   Neck: Normal range of motion. Neck supple. No tracheal deviation present. No thyromegaly present. "   Cardiovascular: Normal rate, regular rhythm, normal heart sounds and intact distal pulses. Exam reveals no gallop and no friction rub.   No murmur heard.  Pulmonary/Chest: Effort normal and breath sounds normal. He has no wheezes. He has no rales.   Abdominal: Soft. He exhibits no mass. There is no tenderness. There is no rebound and no guarding.   Obese  , distended   Musculoskeletal: Normal range of motion. He exhibits no edema.   Lymphadenopathy: He has no cervical adenopathy.   Neurological: He is alert and oriented to person, place, and time.   Skin: Skin is warm. No rash noted. He is not diaphoretic. No erythema.   Nursing note and vitals reviewed.      Labs :    Lab Results   Component Value Date    CREATININE 2.5 (H) 12/18/2017    CREATININE 2.5 (H) 12/18/2017    BUN 55 (H) 12/18/2017    BUN 55 (H) 12/18/2017     12/18/2017     12/18/2017    K 4.6 12/18/2017    K 4.6 12/18/2017     12/18/2017     12/18/2017    CO2 29 12/18/2017    CO2 29 12/18/2017       Lab Results   Component Value Date    WBC 5.78 12/18/2017    HGB 8.9 (L) 12/18/2017    HCT 27.8 (L) 12/18/2017    MCV 95 12/18/2017     12/18/2017       Lab Results   Component Value Date    PTH 88.0 (H) 11/21/2016    CALCIUM 8.9 12/18/2017    CALCIUM 8.9 12/18/2017    PHOS 4.1 03/20/2017       Lab Results   Component Value Date    ALBUMIN 2.6 (L) 12/18/2017     Lab Results   Component Value Date    ALT 15 12/18/2017    AST 25 12/18/2017    ALKPHOS 188 (H) 12/18/2017    BILITOT 0.6 12/18/2017       Lab Results   Component Value Date    HGBA1C 6.9 (H) 11/30/2017         Impression and Plan : 42-year-old  gentleman seen in office today in consultation for following medical problems     1. Chronic kidney disease stage 4 - due to long-standing history of diabetes, status post cardiac transplant. Last creatinine was 2.5 which is his baseline. No proteinuria/hematuria as per urinalysis. He will return in 4-5 months for  follow-up.      2. Hypertension - BP is borderline low. Patient reports that his SBP is chronically in 80s to 90s and he has no symptoms indicating hypotension.      3. Volume - recent echocardiograms reports normal LV function, he is currently on Demadex 40 mg daily. No LE edema.      4. Anemia of chronic kidney disease - he is following with hematology Department     5. Secondary hyperparathyroidism -  his last PTH is acceptable at 88, will continue to monitor    7.  Ascites - he is requiring regular paracentesis. He will follow up with GI team.     8.  Diabetes mellitus type 2 - well-controlled on current regimen.

## 2017-12-20 NOTE — LETTER
December 20, 2017        Placido Tobias  [address] [place] [address]  Phone: [phone]  Fax: [phone]             [facility] [address] [place] [address]  Phone: [phone]   Patient: Lv Crocker   MR Number: [id number]   YOB: 1975   Date of Visit: 12/20/2017       Dear Dr. Placido Tobias    Thank you for referring Lv Crocker to me for evaluation. Attached you will find relevant portions of my assessment and plan of care.    If you have questions, please do not hesitate to call me. I look forward to following Lv Crocker along with you.    Sincerely,    Heriberto Rosa MD    Enclosure    If you would like to receive this communication electronically, please contact [email or web address] or [phone] to request [employer] access.    [employer] is a tool which provides read-only access to select patient information with whom you have a relationship. Its easy to use and provides real time access to review your patients record including encounter summaries, notes, results, and demographic information.    If you feel you have received this communication in error or would no longer like to receive these types of communications, please e-mail [email or web address]

## 2017-12-20 NOTE — PROGRESS NOTES
Subjective:   Mr. Crocker is a 42 y.o. year old White male who received a  - brain death heart transplant on 14.      CMV status:   Donor: -  Recipient: +    HPI  Lv Crocker is a 43 yo WM s/p OHTX , complicated by AMR, treated 04/15 with 5 days PP, IVIG x 2 doses, was scheduled for Rituximab on 5/1/15 but developed pancytopenia in 2015 who comes in for a clinic visit. Angiogram  with IVUS showed no evidence of CAV. 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 He was getting monthly taps but this has recently increased in frequency. He was last seen 1 month ago for his 3rd-year post-annual visit. DSE with no evidence of ischemia and LVEF normal range. He presents today due to not feeling well and having to increase the frequency with which he is getting paracenteses (last one yesterday).     Since last visit, he's had a thoracentesis and a paracentesis last week (hospitalized for PNA, see d/c summary from ). He had another paracentesis (2L removed, without albumin). Today, he reports feeling better and back baseline since getting a paracentesis yesterday. He is followed by Nephrology and saw them this am. They mentioned possibly getting pleurodesis and/or abd port for easy access for paracentesis. Denies cardiac symptoms of chest pain, chest pressure, N/V/F/C, lightheadedness, dizziness, PND, orthopnea, LE edema, abdominal pain, abdominal pressure. Still with NYHA Class III AGUILAR and occasional cough (much improved since d/c and since paracentesis).     Current immunosuppression:   - Pred 2.5 mg daily, myfortic 720mg bid, prograf 3mg/3mg    TTE 17:    1 - Normal left ventricular systolic function (EF 60-65%).     2 - Normal left ventricular diastolic function.     3 - Normal right ventricular systolic function .     4 - Pulmonary hypertension. The estimated PA systolic  pressure is 41 mmHg.     5 - Trivial to mild mitral regurgitation.     6 - Trivial tricuspid regurgitation.     7 - Intermediate central venous pressure.     8 - No wall motion abnormalities.     DSE 11/30/17:    1 - Post-cardiac transplantation study.     2 - Normal left ventricular systolic function (EF 55-60%).     3 - No wall motion abnormalities.     4 - Normal left ventricular diastolic function.     5 - Low normal to mildly depressed right ventricular systolic function .     6 - The estimated PA systolic pressure is 35 mmHg.     7 - Trivial mitral regurgitation.     8 - Mild tricuspid regurgitation.     9 - Intermediate central venous pressure.   -No evidence of stress induced myocardial ischemia.     RHC/Bx 12/12/16  Grade 0 cellular and pAMR  RHC:  AOSAT: 97  FICKCI: 2.92  FICKCO: 5.75  PAPRES: 46/23 (30)  PASAT: 61  PVR: 0.7  PWPRES: 26/39 (26)  RAPRES: 23/23 (18)  RVPRES: 39/18, 18:  PWSAT: 98    Review of Systems   Constitution: Positive for malaise/fatigue. Negative for chills, decreased appetite, diaphoresis, fever, weakness, night sweats, weight gain and weight loss.   HENT: Negative.    Eyes: Negative.  Negative for visual disturbance.   Cardiovascular: Positive for dyspnea on exertion and leg swelling. Negative for chest pain, claudication, cyanosis, irregular heartbeat, near-syncope, orthopnea, palpitations, paroxysmal nocturnal dyspnea and syncope.   Respiratory: Negative for cough, hemoptysis, shortness of breath, sleep disturbances due to breathing, snoring, sputum production and wheezing.    Endocrine: Negative.    Hematologic/Lymphatic: Negative.  Negative for adenopathy and bleeding problem. Does not bruise/bleed easily.   Skin: Positive for poor wound healing. Negative for color change, dry skin, nail changes, rash, skin cancer and suspicious lesions.   Musculoskeletal: Positive for joint pain. Negative for back pain, falls, gout and muscle weakness.        Shoulder-torn rotator cuff  "  Gastrointestinal: Positive for bloating. Negative for abdominal pain, anorexia, change in bowel habit, constipation, diarrhea, heartburn, hematemesis, hematochezia, hemorrhoids, melena, nausea and vomiting.   Genitourinary: Negative.  Negative for nocturia.   Neurological: Negative for excessive daytime sleepiness, dizziness, focal weakness, headaches, light-headedness, paresthesias and tremors.   Psychiatric/Behavioral: Negative for depression and memory loss. The patient does not have insomnia and is not nervous/anxious.        Objective:   Blood pressure (!) 95/44, pulse 98, height 5' 7" (1.702 m), weight 85.1 kg (187 lb 9.8 oz).body mass index is 29.38 kg/m².    Physical Exam   Constitutional: He is oriented to person, place, and time. He appears well-developed and well-nourished. No distress.   HENT:   Head: Normocephalic and atraumatic.   Nose: Nose normal.   Mouth/Throat: Oropharynx is clear and moist. No oropharyngeal exudate.   Eyes: Conjunctivae and EOM are normal. Pupils are equal, round, and reactive to light. No scleral icterus.   Neck: Normal range of motion. Neck supple. JVD (12 cm H2O) present. Carotid bruit is not present. No tracheal deviation present. No thyromegaly present.   Cardiovascular: Regular rhythm, S1 normal, S2 normal, normal heart sounds and normal pulses.  Tachycardia present.  PMI is not displaced.  Exam reveals no gallop, no S3 and no friction rub.    No murmur heard.  tachycardic   Pulmonary/Chest: Effort normal and breath sounds normal. No respiratory distress. He has no wheezes. He has no rales. He exhibits no tenderness.   Abdominal: Soft. Bowel sounds are normal. He exhibits distension (mild) and ascites. He exhibits no mass. There is no tenderness. There is no rebound and no guarding.   Musculoskeletal: Normal range of motion. He exhibits no edema (trace edema bilateral lower extremities) or tenderness.   Neurological: He is alert and oriented to person, place, and time. " Coordination normal.   Skin: Skin is warm and dry. No rash noted. He is not diaphoretic. No erythema. No pallor.   Psychiatric: He has a normal mood and affect. His behavior is normal. Judgment and thought content normal.   Nursing note and vitals reviewed.      Chemistry        Component Value Date/Time     12/20/2017 1250    K 5.0 12/20/2017 1250    CL 98 12/20/2017 1250    CO2 29 12/20/2017 1250    BUN 46 (H) 12/20/2017 1250    CREATININE 2.7 (H) 12/20/2017 1250     (H) 12/20/2017 1250        Component Value Date/Time    CALCIUM 8.8 12/20/2017 1250    ALKPHOS 188 (H) 12/18/2017 0846    AST 25 12/18/2017 0846    ALT 15 12/18/2017 0846    BILITOT 0.6 12/18/2017 0846            Magnesium   Date Value Ref Range Status   12/18/2017 2.1 1.6 - 2.6 mg/dL Final       Lab Results   Component Value Date    WBC 5.78 12/18/2017    HGB 8.9 (L) 12/18/2017    HCT 27.8 (L) 12/18/2017    MCV 95 12/18/2017     12/18/2017       Lab Results   Component Value Date    INR 1.1 11/27/2017    INR 1.1 10/04/2017    INR 1.1 08/15/2017       BNP   Date Value Ref Range Status   12/18/2017 179 (H) 0 - 99 pg/mL Final     Comment:     Values of less than 100 pg/ml are consistent with non-CHF populations.   12/11/2017 330 (H) 0 - 99 pg/mL Final     Comment:     Values of less than 100 pg/ml are consistent with non-CHF populations.   11/30/2017 224 (H) 0 - 99 pg/mL Final     Comment:     Values of less than 100 pg/ml are consistent with non-CHF populations.       Tacrolimus Lvl   Date Value Ref Range Status   12/18/2017 5.3 5.0 - 15.0 ng/mL Final     Comment:     Testing performed by Liquid Chromatography-Tandem  Mass Spectrometry (LC-MS/MS).  This test was developed and its performance characteristics  determined by [facility], Department of Pathology  and Laboratory Medicine in a manner consistent with CLIA  requirements. It has not been cleared or approved by the [place]  Food and Drug Administration.  This test is  used for clinical   purposes.  It should not be regarded as investigational or for  research.         Labs were reviewed with the patient.    Assessment:     1. Heart transplanted    2. Essential hypertension    3. History of restrictive cardiomyopathy    4. PVD (peripheral vascular disease)    5. Chronic kidney disease (CKD), stage III (moderate)    6. Type 1 diabetes mellitus with stage 3 chronic kidney disease    7. Other ascites    8. Pleural effusion        Plan:   S/p OHT  - DSA/C1Q negative from last visit  - Tac 5.3 two days ago; will repeat level prior to adjusting  - Scr stable at 2.7  - Echo from 12/12 normal with intermediate IVC    SOB/Ascites  - volume up on exam; instructed to take an extra torsemide today  - stable per patient    Will discuss with team at chart review regarding plans going forward (pleurodesis vs port vs other).     Return instructions as set forth by post transplant schedule or as needed:    Clinic: Return for labs and/or biopsy weekly the first month, every two weeks during month 2 and then monthly for the first year at the provider or coordinator's discretion. During the second year, return to clinic every 3 months. Post transplant year 3-5 return every 6 months. There will be a comprehensive post transplant evaluation every year that may include LHC/RHC/biopsy, stress test, echo, CXR, and other health screening exams.    In addition to the clinical assessment, I have ordered Allomap testing for this patient to assist in identification of moderate/severe acute cellular rejection (ACR) in a pt with stable Allograft function instead of endomyocardial biopsy.     Patient is reminded to call with any health changes as these can be early signs of transplant complications. Patient is advised to make sure any new medications or changes of old medications are discussed with a pharmacist or physician knowledgeable with transplant to avoid rejection/drug toxicity related to significant drug  interactions.    UNOS Patient Status  Functional Status: 80% - Normal activity with effort: some symptoms of disease  Physical Capacity: No Limitations  Working for Income: Unknown    Heriberto Rosa MD

## 2017-12-21 NOTE — TELEPHONE ENCOUNTER
"Received a referral from Dr. Ferreira to Dr. James re: right pleural effusion. Pt received a heart transplant on 11/18/14 complicated by AMR.   CT chest on 12/12/17 revealed "1.  Moderate volume right pleural fluid that is worsening of a chronic finding as seen on multiple prior radiographs.  There is associated compressive atelectasis.     2.  Bilateral diffuse patchy consolidation and groundglass densities that are new since radiograph 11/30/2017 and appear worse than chest radiograph 12/10/2017.  Differential includes multifocal infectious pneumonia, pulmonary edema, hemorrhage, aspiration pneumonia, and organizing pneumonia."  Patient being referred to thoracic surgery to evaluate for possible pleurodesis.    Spoke with the pt, he would like to wait until the beginning of January for an appointment. Scheduled for Dr. James on 1/5/18 at 11am. Mailed appt slip along with campus map to confirmed home address.  "

## 2018-01-01 ENCOUNTER — PATIENT MESSAGE (OUTPATIENT)
Dept: TRANSPLANT | Facility: CLINIC | Age: 43
End: 2018-01-01

## 2018-01-01 ENCOUNTER — HOSPITAL ENCOUNTER (INPATIENT)
Facility: HOSPITAL | Age: 43
LOS: 3 days | Discharge: HOME | DRG: 187 | End: 2018-02-14
Attending: EMERGENCY MEDICINE | Admitting: INTERNAL MEDICINE
Payer: MEDICARE

## 2018-01-01 ENCOUNTER — TELEPHONE (OUTPATIENT)
Dept: CARDIOTHORACIC SURGERY | Facility: CLINIC | Age: 43
End: 2018-01-01

## 2018-01-01 ENCOUNTER — TELEPHONE (OUTPATIENT)
Dept: HEMATOLOGY/ONCOLOGY | Facility: CLINIC | Age: 43
End: 2018-01-01

## 2018-01-01 ENCOUNTER — HOSPITAL ENCOUNTER (OUTPATIENT)
Facility: HOSPITAL | Age: 43
LOS: 1 days | Discharge: HOME | End: 2018-01-12
Attending: THORACIC SURGERY (CARDIOTHORACIC VASCULAR SURGERY) | Admitting: THORACIC SURGERY (CARDIOTHORACIC VASCULAR SURGERY)
Payer: MEDICARE

## 2018-01-01 ENCOUNTER — PATIENT MESSAGE (OUTPATIENT)
Dept: CARDIOTHORACIC SURGERY | Facility: CLINIC | Age: 43
End: 2018-01-01

## 2018-01-01 ENCOUNTER — DOCUMENTATION ONLY (OUTPATIENT)
Dept: CARDIOLOGY | Facility: CLINIC | Age: 43
End: 2018-01-01

## 2018-01-01 ENCOUNTER — ANESTHESIA (OUTPATIENT)
Dept: SURGERY | Facility: HOSPITAL | Age: 43
DRG: 003 | End: 2018-01-01
Payer: MEDICARE

## 2018-01-01 ENCOUNTER — OFFICE VISIT (OUTPATIENT)
Dept: HEMATOLOGY/ONCOLOGY | Facility: CLINIC | Age: 43
End: 2018-01-01
Payer: MEDICARE

## 2018-01-01 ENCOUNTER — ANESTHESIA (OUTPATIENT)
Dept: ENDOSCOPY | Facility: HOSPITAL | Age: 43
DRG: 003 | End: 2018-01-01
Payer: MEDICARE

## 2018-01-01 ENCOUNTER — TELEPHONE (OUTPATIENT)
Dept: INTERNAL MEDICINE | Facility: CLINIC | Age: 43
End: 2018-01-01

## 2018-01-01 ENCOUNTER — TELEPHONE (OUTPATIENT)
Dept: TRANSPLANT | Facility: CLINIC | Age: 43
End: 2018-01-01

## 2018-01-01 ENCOUNTER — ANESTHESIA (OUTPATIENT)
Dept: INTENSIVE CARE | Facility: HOSPITAL | Age: 43
DRG: 003 | End: 2018-01-01
Payer: MEDICARE

## 2018-01-01 ENCOUNTER — HOSPITAL ENCOUNTER (OUTPATIENT)
Dept: RADIOLOGY | Facility: HOSPITAL | Age: 43
Discharge: HOME | DRG: 853 | End: 2018-05-28
Attending: PHYSICAL MEDICINE & REHABILITATION
Payer: MEDICARE

## 2018-01-01 ENCOUNTER — HOSPITAL ENCOUNTER (INPATIENT)
Facility: HOSPITAL | Age: 43
LOS: 13 days | Discharge: NURSING, REHAB OR LONG-TERM CARE | DRG: 853 | End: 2018-06-11
Attending: EMERGENCY MEDICINE | Admitting: INTERNAL MEDICINE
Payer: MEDICARE

## 2018-01-01 ENCOUNTER — PATIENT MESSAGE (OUTPATIENT)
Dept: HEMATOLOGY/ONCOLOGY | Facility: CLINIC | Age: 43
End: 2018-01-01

## 2018-01-01 ENCOUNTER — LAB VISIT (OUTPATIENT)
Dept: LAB | Facility: HOSPITAL | Age: 43
End: 2018-01-01
Attending: INTERNAL MEDICINE
Payer: MEDICARE

## 2018-01-01 ENCOUNTER — ANESTHESIA EVENT (OUTPATIENT)
Dept: SURGERY | Facility: HOSPITAL | Age: 43
DRG: 003 | End: 2018-01-01
Payer: MEDICARE

## 2018-01-01 ENCOUNTER — LAB VISIT (OUTPATIENT)
Dept: LAB | Facility: HOSPITAL | Age: 43
End: 2018-01-01
Attending: PATHOLOGY
Payer: MEDICARE

## 2018-01-01 ENCOUNTER — OFFICE VISIT (OUTPATIENT)
Dept: CARDIOTHORACIC SURGERY | Facility: CLINIC | Age: 43
End: 2018-01-01
Payer: MEDICARE

## 2018-01-01 ENCOUNTER — HOSPITAL ENCOUNTER (INPATIENT)
Facility: HOSPITAL | Age: 43
LOS: 68 days | Discharge: NURSING, REHAB OR LONG-TERM CARE | DRG: 003 | End: 2018-05-18
Attending: EMERGENCY MEDICINE | Admitting: INTERNAL MEDICINE
Payer: MEDICARE

## 2018-01-01 ENCOUNTER — HOSPITAL ENCOUNTER (OUTPATIENT)
Dept: RADIOLOGY | Facility: HOSPITAL | Age: 43
Discharge: HOME | End: 2018-01-23
Attending: INTERNAL MEDICINE
Payer: MEDICARE

## 2018-01-01 ENCOUNTER — PATIENT OUTREACH (OUTPATIENT)
Dept: ADMINISTRATIVE | Facility: HOSPITAL | Age: 43
End: 2018-01-01

## 2018-01-01 ENCOUNTER — ANESTHESIA EVENT (OUTPATIENT)
Dept: ENDOSCOPY | Facility: HOSPITAL | Age: 43
DRG: 003 | End: 2018-01-01
Payer: MEDICARE

## 2018-01-01 ENCOUNTER — HOSPITAL ENCOUNTER (OUTPATIENT)
Dept: RADIOLOGY | Facility: HOSPITAL | Age: 43
Discharge: HOME | End: 2018-02-19
Attending: INTERNAL MEDICINE
Payer: MEDICARE

## 2018-01-01 ENCOUNTER — OFFICE VISIT (OUTPATIENT)
Dept: TRANSPLANT | Facility: CLINIC | Age: 43
End: 2018-01-01
Payer: MEDICARE

## 2018-01-01 ENCOUNTER — TELEPHONE (OUTPATIENT)
Dept: TRANSPLANT | Facility: HOSPITAL | Age: 43
End: 2018-01-01

## 2018-01-01 ENCOUNTER — OFFICE VISIT (OUTPATIENT)
Dept: PULMONOLOGY | Facility: CLINIC | Age: 43
End: 2018-01-01
Payer: MEDICARE

## 2018-01-01 ENCOUNTER — ANESTHESIA EVENT (OUTPATIENT)
Dept: INTENSIVE CARE | Facility: HOSPITAL | Age: 43
DRG: 003 | End: 2018-01-01
Payer: MEDICARE

## 2018-01-01 ENCOUNTER — INFUSION (OUTPATIENT)
Dept: INFUSION THERAPY | Facility: HOSPITAL | Age: 43
End: 2018-01-01
Attending: INTERNAL MEDICINE
Payer: MEDICARE

## 2018-01-01 ENCOUNTER — PROCEDURE VISIT (OUTPATIENT)
Dept: PULMONOLOGY | Facility: CLINIC | Age: 43
End: 2018-01-01
Payer: MEDICARE

## 2018-01-01 ENCOUNTER — HOSPITAL ENCOUNTER (OUTPATIENT)
Dept: RADIOLOGY | Facility: HOSPITAL | Age: 43
Discharge: HOME | End: 2018-02-07
Attending: THORACIC SURGERY (CARDIOTHORACIC VASCULAR SURGERY)
Payer: MEDICARE

## 2018-01-01 ENCOUNTER — OFFICE VISIT (OUTPATIENT)
Dept: INTERNAL MEDICINE | Facility: CLINIC | Age: 43
End: 2018-01-01
Payer: MEDICARE

## 2018-01-01 ENCOUNTER — ANESTHESIA (OUTPATIENT)
Dept: SURGERY | Facility: HOSPITAL | Age: 43
End: 2018-01-01
Payer: MEDICARE

## 2018-01-01 ENCOUNTER — HOSPITAL ENCOUNTER (OUTPATIENT)
Dept: RADIOLOGY | Facility: HOSPITAL | Age: 43
Discharge: HOME | End: 2018-01-19
Attending: THORACIC SURGERY (CARDIOTHORACIC VASCULAR SURGERY)
Payer: MEDICARE

## 2018-01-01 ENCOUNTER — HOSPITAL ENCOUNTER (INPATIENT)
Facility: HOSPITAL | Age: 43
LOS: 4 days | DRG: 871 | End: 2018-06-19
Attending: INTERNAL MEDICINE | Admitting: INTERNAL MEDICINE
Payer: MEDICARE

## 2018-01-01 ENCOUNTER — ANESTHESIA EVENT (OUTPATIENT)
Dept: SURGERY | Facility: HOSPITAL | Age: 43
End: 2018-01-01
Payer: MEDICARE

## 2018-01-01 VITALS
OXYGEN SATURATION: 96 % | HEIGHT: 67 IN | SYSTOLIC BLOOD PRESSURE: 112 MMHG | WEIGHT: 192 LBS | BODY MASS INDEX: 30.13 KG/M2 | DIASTOLIC BLOOD PRESSURE: 70 MMHG | HEART RATE: 105 BPM

## 2018-01-01 VITALS
SYSTOLIC BLOOD PRESSURE: 100 MMHG | HEART RATE: 118 BPM | SYSTOLIC BLOOD PRESSURE: 90 MMHG | HEART RATE: 88 BPM | DIASTOLIC BLOOD PRESSURE: 82 MMHG | WEIGHT: 185.63 LBS | BODY MASS INDEX: 29.38 KG/M2 | WEIGHT: 187.19 LBS | OXYGEN SATURATION: 97 % | DIASTOLIC BLOOD PRESSURE: 60 MMHG | BODY MASS INDEX: 29.13 KG/M2 | HEIGHT: 67 IN | HEIGHT: 67 IN

## 2018-01-01 VITALS
DIASTOLIC BLOOD PRESSURE: 62 MMHG | TEMPERATURE: 99 F | BODY MASS INDEX: 29.18 KG/M2 | HEIGHT: 67 IN | HEIGHT: 67 IN | HEART RATE: 109 BPM | BODY MASS INDEX: 28.55 KG/M2 | SYSTOLIC BLOOD PRESSURE: 90 MMHG | WEIGHT: 181.88 LBS | OXYGEN SATURATION: 97 % | WEIGHT: 185.94 LBS

## 2018-01-01 VITALS
HEART RATE: 107 BPM | BODY MASS INDEX: 30.56 KG/M2 | SYSTOLIC BLOOD PRESSURE: 109 MMHG | DIASTOLIC BLOOD PRESSURE: 69 MMHG | OXYGEN SATURATION: 94 % | WEIGHT: 194.69 LBS | HEIGHT: 67 IN

## 2018-01-01 VITALS
WEIGHT: 189.63 LBS | HEIGHT: 67 IN | DIASTOLIC BLOOD PRESSURE: 55 MMHG | TEMPERATURE: 97 F | OXYGEN SATURATION: 98 % | HEART RATE: 110 BPM | SYSTOLIC BLOOD PRESSURE: 99 MMHG | BODY MASS INDEX: 29.76 KG/M2

## 2018-01-01 VITALS
HEART RATE: 102 BPM | SYSTOLIC BLOOD PRESSURE: 112 MMHG | DIASTOLIC BLOOD PRESSURE: 64 MMHG | WEIGHT: 175.94 LBS | TEMPERATURE: 98 F | BODY MASS INDEX: 27.61 KG/M2 | RESPIRATION RATE: 16 BRPM | OXYGEN SATURATION: 98 % | HEIGHT: 67 IN

## 2018-01-01 VITALS
SYSTOLIC BLOOD PRESSURE: 96 MMHG | HEART RATE: 113 BPM | OXYGEN SATURATION: 97 % | TEMPERATURE: 101 F | DIASTOLIC BLOOD PRESSURE: 66 MMHG | RESPIRATION RATE: 16 BRPM

## 2018-01-01 VITALS
RESPIRATION RATE: 18 BRPM | OXYGEN SATURATION: 98 % | TEMPERATURE: 98 F | DIASTOLIC BLOOD PRESSURE: 60 MMHG | HEART RATE: 105 BPM | SYSTOLIC BLOOD PRESSURE: 88 MMHG

## 2018-01-01 VITALS
HEART RATE: 113 BPM | OXYGEN SATURATION: 97 % | BODY MASS INDEX: 26.57 KG/M2 | SYSTOLIC BLOOD PRESSURE: 116 MMHG | TEMPERATURE: 99 F | WEIGHT: 169.31 LBS | HEIGHT: 67 IN | DIASTOLIC BLOOD PRESSURE: 72 MMHG | RESPIRATION RATE: 15 BRPM

## 2018-01-01 VITALS
TEMPERATURE: 97 F | BODY MASS INDEX: 29.66 KG/M2 | RESPIRATION RATE: 18 BRPM | HEART RATE: 110 BPM | HEIGHT: 67 IN | SYSTOLIC BLOOD PRESSURE: 99 MMHG | DIASTOLIC BLOOD PRESSURE: 62 MMHG | WEIGHT: 189 LBS | OXYGEN SATURATION: 95 %

## 2018-01-01 VITALS
BODY MASS INDEX: 28.56 KG/M2 | HEART RATE: 100 BPM | RESPIRATION RATE: 18 BRPM | HEIGHT: 67 IN | WEIGHT: 182 LBS | OXYGEN SATURATION: 94 % | SYSTOLIC BLOOD PRESSURE: 100 MMHG | DIASTOLIC BLOOD PRESSURE: 60 MMHG

## 2018-01-01 VITALS
DIASTOLIC BLOOD PRESSURE: 75 MMHG | BODY MASS INDEX: 30.13 KG/M2 | WEIGHT: 192 LBS | RESPIRATION RATE: 16 BRPM | HEIGHT: 67 IN | SYSTOLIC BLOOD PRESSURE: 125 MMHG | TEMPERATURE: 98 F | OXYGEN SATURATION: 100 % | HEART RATE: 102 BPM

## 2018-01-01 VITALS
WEIGHT: 163.56 LBS | SYSTOLIC BLOOD PRESSURE: 85 MMHG | HEIGHT: 67 IN | DIASTOLIC BLOOD PRESSURE: 42 MMHG | OXYGEN SATURATION: 86 % | BODY MASS INDEX: 25.67 KG/M2 | HEART RATE: 137 BPM | RESPIRATION RATE: 34 BRPM | TEMPERATURE: 99 F

## 2018-01-01 VITALS
DIASTOLIC BLOOD PRESSURE: 52 MMHG | OXYGEN SATURATION: 93 % | TEMPERATURE: 99 F | HEIGHT: 67 IN | HEART RATE: 100 BPM | WEIGHT: 187.38 LBS | SYSTOLIC BLOOD PRESSURE: 93 MMHG | RESPIRATION RATE: 16 BRPM | BODY MASS INDEX: 29.41 KG/M2

## 2018-01-01 DIAGNOSIS — Z94.1 STATUS POST HEART TRANSPLANT: Primary | ICD-10-CM

## 2018-01-01 DIAGNOSIS — Z79.52 CURRENT CHRONIC USE OF SYSTEMIC STEROIDS: ICD-10-CM

## 2018-01-01 DIAGNOSIS — I10 ESSENTIAL HYPERTENSION: Primary | ICD-10-CM

## 2018-01-01 DIAGNOSIS — N18.30 CHRONIC KIDNEY DISEASE (CKD), STAGE III (MODERATE): ICD-10-CM

## 2018-01-01 DIAGNOSIS — N18.30 ANEMIA OF CHRONIC RENAL FAILURE, STAGE 3 (MODERATE): ICD-10-CM

## 2018-01-01 DIAGNOSIS — I42.5 RESTRICTIVE CARDIOMYOPATHY: ICD-10-CM

## 2018-01-01 DIAGNOSIS — T86.20 COMPLICATION OF HEART TRANSPLANT, UNSPECIFIED COMPLICATION: ICD-10-CM

## 2018-01-01 DIAGNOSIS — D84.9 IMMUNOSUPPRESSION: ICD-10-CM

## 2018-01-01 DIAGNOSIS — R57.9 SHOCK, UNSPECIFIED: ICD-10-CM

## 2018-01-01 DIAGNOSIS — D50.8 IRON DEFICIENCY ANEMIA SECONDARY TO INADEQUATE DIETARY IRON INTAKE: ICD-10-CM

## 2018-01-01 DIAGNOSIS — I73.9 PVD (PERIPHERAL VASCULAR DISEASE): ICD-10-CM

## 2018-01-01 DIAGNOSIS — E06.3 HYPOTHYROIDISM DUE TO HASHIMOTO'S THYROIDITIS: ICD-10-CM

## 2018-01-01 DIAGNOSIS — Z79.52 LONG TERM CURRENT USE OF SYSTEMIC STEROIDS: ICD-10-CM

## 2018-01-01 DIAGNOSIS — R06.02 SOB (SHORTNESS OF BREATH): ICD-10-CM

## 2018-01-01 DIAGNOSIS — R05.3 COUGH, PERSISTENT: ICD-10-CM

## 2018-01-01 DIAGNOSIS — Z91.89 AT RISK FOR OBSTRUCTIVE SLEEP APNEA: ICD-10-CM

## 2018-01-01 DIAGNOSIS — D63.1 ANEMIA OF CHRONIC RENAL FAILURE, STAGE 3 (MODERATE): ICD-10-CM

## 2018-01-01 DIAGNOSIS — N18.30 TYPE 1 DIABETES MELLITUS WITH STAGE 3 CHRONIC KIDNEY DISEASE: ICD-10-CM

## 2018-01-01 DIAGNOSIS — N18.4 CKD (CHRONIC KIDNEY DISEASE), STAGE IV: ICD-10-CM

## 2018-01-01 DIAGNOSIS — N28.9 RENAL INSUFFICIENCY: ICD-10-CM

## 2018-01-01 DIAGNOSIS — Z94.1 HEART TRANSPLANTED: ICD-10-CM

## 2018-01-01 DIAGNOSIS — J90 PLEURAL EFFUSION: ICD-10-CM

## 2018-01-01 DIAGNOSIS — G47.30 SLEEP-DISORDERED BREATHING: Primary | ICD-10-CM

## 2018-01-01 DIAGNOSIS — J18.9 PNEUMONIA OF LEFT LUNG DUE TO INFECTIOUS ORGANISM, UNSPECIFIED PART OF LUNG: ICD-10-CM

## 2018-01-01 DIAGNOSIS — R14.0 ABDOMINAL DISTENTION: ICD-10-CM

## 2018-01-01 DIAGNOSIS — R91.8 PULMONARY INFILTRATES ON CXR: ICD-10-CM

## 2018-01-01 DIAGNOSIS — I96 GANGRENE: ICD-10-CM

## 2018-01-01 DIAGNOSIS — I96 NECROTIC TOES: ICD-10-CM

## 2018-01-01 DIAGNOSIS — G93.40 ACUTE ENCEPHALOPATHY: ICD-10-CM

## 2018-01-01 DIAGNOSIS — N18.30 ANEMIA OF CHRONIC RENAL FAILURE, STAGE 3 (MODERATE): Primary | ICD-10-CM

## 2018-01-01 DIAGNOSIS — E03.8 HYPOTHYROIDISM DUE TO HASHIMOTO'S THYROIDITIS: ICD-10-CM

## 2018-01-01 DIAGNOSIS — D63.1 ANEMIA OF CHRONIC RENAL FAILURE, STAGE 3 (MODERATE): Primary | ICD-10-CM

## 2018-01-01 DIAGNOSIS — Z94.1 HEART TRANSPLANTED: Primary | ICD-10-CM

## 2018-01-01 DIAGNOSIS — R50.9 FEVER, UNSPECIFIED FEVER CAUSE: ICD-10-CM

## 2018-01-01 DIAGNOSIS — D50.9 IRON DEFICIENCY ANEMIA, UNSPECIFIED IRON DEFICIENCY ANEMIA TYPE: Primary | ICD-10-CM

## 2018-01-01 DIAGNOSIS — Z94.1 STATUS POST HEART TRANSPLANT: ICD-10-CM

## 2018-01-01 DIAGNOSIS — Z79.899 ENCOUNTER FOR LONG-TERM (CURRENT) USE OF MEDICATIONS: ICD-10-CM

## 2018-01-01 DIAGNOSIS — Z79.60 LONG-TERM USE OF IMMUNOSUPPRESSANT MEDICATION: ICD-10-CM

## 2018-01-01 DIAGNOSIS — R06.83 SNORING: ICD-10-CM

## 2018-01-01 DIAGNOSIS — Z86.79: ICD-10-CM

## 2018-01-01 DIAGNOSIS — I50.9 HEART FAILURE: ICD-10-CM

## 2018-01-01 DIAGNOSIS — N17.0 ACUTE RENAL FAILURE WITH ACUTE TUBULAR NECROSIS SUPERIMPOSED ON STAGE 3 CHRONIC KIDNEY DISEASE: ICD-10-CM

## 2018-01-01 DIAGNOSIS — E10.22 TYPE 1 DIABETES MELLITUS WITH STAGE 3 CHRONIC KIDNEY DISEASE: ICD-10-CM

## 2018-01-01 DIAGNOSIS — J90 PLEURAL EFFUSION: Primary | ICD-10-CM

## 2018-01-01 DIAGNOSIS — R09.02 HYPOXIA: ICD-10-CM

## 2018-01-01 DIAGNOSIS — N17.0 ACUTE RENAL FAILURE WITH ACUTE TUBULAR NECROSIS SUPERIMPOSED ON STAGE 4 CHRONIC KIDNEY DISEASE: ICD-10-CM

## 2018-01-01 DIAGNOSIS — N18.30 ACUTE RENAL FAILURE WITH ACUTE TUBULAR NECROSIS SUPERIMPOSED ON STAGE 3 CHRONIC KIDNEY DISEASE: ICD-10-CM

## 2018-01-01 DIAGNOSIS — R00.0 TACHYCARDIA: ICD-10-CM

## 2018-01-01 DIAGNOSIS — Z01.818 PREOP EXAMINATION: Primary | ICD-10-CM

## 2018-01-01 DIAGNOSIS — F51.04 CHRONIC INSOMNIA: ICD-10-CM

## 2018-01-01 DIAGNOSIS — I95.9 HYPOTENSION: ICD-10-CM

## 2018-01-01 DIAGNOSIS — I10 ESSENTIAL HYPERTENSION: ICD-10-CM

## 2018-01-01 DIAGNOSIS — N17.9 AKI (ACUTE KIDNEY INJURY): ICD-10-CM

## 2018-01-01 DIAGNOSIS — I50.9 CHF (CONGESTIVE HEART FAILURE): ICD-10-CM

## 2018-01-01 DIAGNOSIS — R05.9 COUGH: ICD-10-CM

## 2018-01-01 DIAGNOSIS — N18.30 TYPE 1 DIABETES MELLITUS WITH STAGE 3 CHRONIC KIDNEY DISEASE: Primary | ICD-10-CM

## 2018-01-01 DIAGNOSIS — I95.9 HYPOTENSION, UNSPECIFIED HYPOTENSION TYPE: ICD-10-CM

## 2018-01-01 DIAGNOSIS — E03.9 ACQUIRED HYPOTHYROIDISM: ICD-10-CM

## 2018-01-01 DIAGNOSIS — R53.81 PHYSICAL DEBILITY: ICD-10-CM

## 2018-01-01 DIAGNOSIS — F41.9 ANXIETY: ICD-10-CM

## 2018-01-01 DIAGNOSIS — R50.9 FEVER: ICD-10-CM

## 2018-01-01 DIAGNOSIS — F32.A DEPRESSION, UNSPECIFIED DEPRESSION TYPE: ICD-10-CM

## 2018-01-01 DIAGNOSIS — E43 SEVERE MALNUTRITION: ICD-10-CM

## 2018-01-01 DIAGNOSIS — E78.2 MIXED HYPERLIPIDEMIA: ICD-10-CM

## 2018-01-01 DIAGNOSIS — R19.7 DIARRHEA OF PRESUMED INFECTIOUS ORIGIN: ICD-10-CM

## 2018-01-01 DIAGNOSIS — E10.22 TYPE 1 DIABETES MELLITUS WITH STAGE 3 CHRONIC KIDNEY DISEASE: Primary | ICD-10-CM

## 2018-01-01 DIAGNOSIS — K21.9 GERD WITHOUT ESOPHAGITIS: ICD-10-CM

## 2018-01-01 DIAGNOSIS — R53.81 DEBILITY: ICD-10-CM

## 2018-01-01 DIAGNOSIS — K31.84 GASTROPARESIS: ICD-10-CM

## 2018-01-01 DIAGNOSIS — N18.6 ESRD (END STAGE RENAL DISEASE): ICD-10-CM

## 2018-01-01 DIAGNOSIS — Z94.1 HEART TRANSPLANT RECIPIENT: Primary | ICD-10-CM

## 2018-01-01 DIAGNOSIS — K56.7 ILEUS: ICD-10-CM

## 2018-01-01 DIAGNOSIS — J96.01 ACUTE RESPIRATORY FAILURE WITH HYPOXIA: ICD-10-CM

## 2018-01-01 DIAGNOSIS — J90 PLEURAL EFFUSION, RIGHT: Primary | ICD-10-CM

## 2018-01-01 DIAGNOSIS — R50.81 FEVER IN OTHER DISEASES: ICD-10-CM

## 2018-01-01 DIAGNOSIS — Z12.5 SCREENING FOR PROSTATE CANCER: ICD-10-CM

## 2018-01-01 DIAGNOSIS — E10.65 TYPE 1 DIABETES MELLITUS WITH HYPERGLYCEMIA: Chronic | ICD-10-CM

## 2018-01-01 DIAGNOSIS — Z72.821 INADEQUATE SLEEP HYGIENE: ICD-10-CM

## 2018-01-01 DIAGNOSIS — R18.8 OTHER ASCITES: ICD-10-CM

## 2018-01-01 DIAGNOSIS — N18.4 ACUTE RENAL FAILURE WITH ACUTE TUBULAR NECROSIS SUPERIMPOSED ON STAGE 4 CHRONIC KIDNEY DISEASE: ICD-10-CM

## 2018-01-01 DIAGNOSIS — R11.0 NAUSEA: ICD-10-CM

## 2018-01-01 DIAGNOSIS — R06.89 DECREASED BREATH SOUNDS: ICD-10-CM

## 2018-01-01 DIAGNOSIS — J96.90 RESPIRATORY FAILURE, UNSPECIFIED CHRONICITY, UNSPECIFIED WHETHER WITH HYPOXIA OR HYPERCAPNIA: ICD-10-CM

## 2018-01-01 DIAGNOSIS — E03.9 HYPOTHYROIDISM, UNSPECIFIED TYPE: ICD-10-CM

## 2018-01-01 DIAGNOSIS — R18.8 OTHER ASCITES: Primary | ICD-10-CM

## 2018-01-01 DIAGNOSIS — R19.7 DIARRHEA, UNSPECIFIED TYPE: ICD-10-CM

## 2018-01-01 DIAGNOSIS — J90 PLEURAL EFFUSION, RIGHT: ICD-10-CM

## 2018-01-01 DIAGNOSIS — R53.83 FATIGUE, UNSPECIFIED TYPE: ICD-10-CM

## 2018-01-01 DIAGNOSIS — E86.0 DEHYDRATION: ICD-10-CM

## 2018-01-01 DIAGNOSIS — R19.7 DIARRHEA: ICD-10-CM

## 2018-01-01 DIAGNOSIS — Z78.9 ON ENTERAL NUTRITION: ICD-10-CM

## 2018-01-01 LAB
1,3 BETA GLUCAN SPEC-MCNC: 99 PG/ML
1,3 BETA GLUCAN SPEC-MCNC: <31 PG/ML
25(OH)D3+25(OH)D2 SERPL-MCNC: 18 NG/ML
ABO + RH BLD: NORMAL
ABO GROUP BLD: NORMAL
ABO GROUP BLD: NORMAL
ACID FAST MOD KINY STN SPEC: NORMAL
ALBUMIN SERPL BCP-MCNC: 1.6 G/DL
ALBUMIN SERPL BCP-MCNC: 1.7 G/DL
ALBUMIN SERPL BCP-MCNC: 1.8 G/DL
ALBUMIN SERPL BCP-MCNC: 1.9 G/DL
ALBUMIN SERPL BCP-MCNC: 1.9 G/DL
ALBUMIN SERPL BCP-MCNC: 2 G/DL
ALBUMIN SERPL BCP-MCNC: 2.1 G/DL
ALBUMIN SERPL BCP-MCNC: 2.2 G/DL
ALBUMIN SERPL BCP-MCNC: 2.3 G/DL
ALBUMIN SERPL BCP-MCNC: 2.4 G/DL
ALBUMIN SERPL BCP-MCNC: 2.5 G/DL
ALBUMIN SERPL BCP-MCNC: 2.6 G/DL
ALBUMIN SERPL BCP-MCNC: 2.7 G/DL
ALBUMIN SERPL BCP-MCNC: 2.8 G/DL
ALBUMIN SERPL BCP-MCNC: 2.9 G/DL
ALBUMIN SERPL BCP-MCNC: 3 G/DL
ALBUMIN SERPL BCP-MCNC: 3.2 G/DL
ALLENS TEST: ABNORMAL
ALLENS TEST: NORMAL
ALP SERPL-CCNC: 109 U/L
ALP SERPL-CCNC: 110 U/L
ALP SERPL-CCNC: 117 U/L
ALP SERPL-CCNC: 119 U/L
ALP SERPL-CCNC: 120 U/L
ALP SERPL-CCNC: 121 U/L
ALP SERPL-CCNC: 122 U/L
ALP SERPL-CCNC: 123 U/L
ALP SERPL-CCNC: 125 U/L
ALP SERPL-CCNC: 126 U/L
ALP SERPL-CCNC: 126 U/L
ALP SERPL-CCNC: 129 U/L
ALP SERPL-CCNC: 131 U/L
ALP SERPL-CCNC: 132 U/L
ALP SERPL-CCNC: 136 U/L
ALP SERPL-CCNC: 137 U/L
ALP SERPL-CCNC: 137 U/L
ALP SERPL-CCNC: 138 U/L
ALP SERPL-CCNC: 142 U/L
ALP SERPL-CCNC: 142 U/L
ALP SERPL-CCNC: 147 U/L
ALP SERPL-CCNC: 150 U/L
ALP SERPL-CCNC: 150 U/L
ALP SERPL-CCNC: 151 U/L
ALP SERPL-CCNC: 151 U/L
ALP SERPL-CCNC: 153 U/L
ALP SERPL-CCNC: 155 U/L
ALP SERPL-CCNC: 158 U/L
ALP SERPL-CCNC: 162 U/L
ALP SERPL-CCNC: 165 U/L
ALP SERPL-CCNC: 165 U/L
ALP SERPL-CCNC: 166 U/L
ALP SERPL-CCNC: 167 U/L
ALP SERPL-CCNC: 167 U/L
ALP SERPL-CCNC: 170 U/L
ALP SERPL-CCNC: 170 U/L
ALP SERPL-CCNC: 172 U/L
ALP SERPL-CCNC: 174 U/L
ALP SERPL-CCNC: 178 U/L
ALP SERPL-CCNC: 182 U/L
ALP SERPL-CCNC: 183 U/L
ALP SERPL-CCNC: 183 U/L
ALP SERPL-CCNC: 185 U/L
ALP SERPL-CCNC: 185 U/L
ALP SERPL-CCNC: 186 U/L
ALP SERPL-CCNC: 187 U/L
ALP SERPL-CCNC: 187 U/L
ALP SERPL-CCNC: 188 U/L
ALP SERPL-CCNC: 189 U/L
ALP SERPL-CCNC: 191 U/L
ALP SERPL-CCNC: 191 U/L
ALP SERPL-CCNC: 193 U/L
ALP SERPL-CCNC: 195 U/L
ALP SERPL-CCNC: 196 U/L
ALP SERPL-CCNC: 197 U/L
ALP SERPL-CCNC: 199 U/L
ALP SERPL-CCNC: 199 U/L
ALP SERPL-CCNC: 200 U/L
ALP SERPL-CCNC: 200 U/L
ALP SERPL-CCNC: 201 U/L
ALP SERPL-CCNC: 201 U/L
ALP SERPL-CCNC: 202 U/L
ALP SERPL-CCNC: 203 U/L
ALP SERPL-CCNC: 204 U/L
ALP SERPL-CCNC: 206 U/L
ALP SERPL-CCNC: 207 U/L
ALP SERPL-CCNC: 208 U/L
ALP SERPL-CCNC: 209 U/L
ALP SERPL-CCNC: 210 U/L
ALP SERPL-CCNC: 210 U/L
ALP SERPL-CCNC: 214 U/L
ALP SERPL-CCNC: 215 U/L
ALP SERPL-CCNC: 216 U/L
ALP SERPL-CCNC: 222 U/L
ALP SERPL-CCNC: 223 U/L
ALP SERPL-CCNC: 223 U/L
ALP SERPL-CCNC: 230 U/L
ALP SERPL-CCNC: 231 U/L
ALP SERPL-CCNC: 259 U/L
ALP SERPL-CCNC: 286 U/L
ALP SERPL-CCNC: 298 U/L
ALP SERPL-CCNC: 303 U/L
ALP SERPL-CCNC: 317 U/L
ALP SERPL-CCNC: 366 U/L
ALP SERPL-CCNC: 413 U/L
ALP SERPL-CCNC: 418 U/L
ALP SERPL-CCNC: 470 U/L
ALP SERPL-CCNC: 545 U/L
ALT SERPL W/O P-5'-P-CCNC: 10 U/L
ALT SERPL W/O P-5'-P-CCNC: 11 U/L
ALT SERPL W/O P-5'-P-CCNC: 12 U/L
ALT SERPL W/O P-5'-P-CCNC: 13 U/L
ALT SERPL W/O P-5'-P-CCNC: 14 U/L
ALT SERPL W/O P-5'-P-CCNC: 15 U/L
ALT SERPL W/O P-5'-P-CCNC: 16 U/L
ALT SERPL W/O P-5'-P-CCNC: 17 U/L
ALT SERPL W/O P-5'-P-CCNC: 17 U/L
ALT SERPL W/O P-5'-P-CCNC: 18 U/L
ALT SERPL W/O P-5'-P-CCNC: 20 U/L
ALT SERPL W/O P-5'-P-CCNC: 21 U/L
ALT SERPL W/O P-5'-P-CCNC: 24 U/L
ALT SERPL W/O P-5'-P-CCNC: 24 U/L
ALT SERPL W/O P-5'-P-CCNC: 26 U/L
ALT SERPL W/O P-5'-P-CCNC: 27 U/L
ALT SERPL W/O P-5'-P-CCNC: 29 U/L
ALT SERPL W/O P-5'-P-CCNC: 33 U/L
ALT SERPL W/O P-5'-P-CCNC: 33 U/L
ALT SERPL W/O P-5'-P-CCNC: 37 U/L
ALT SERPL W/O P-5'-P-CCNC: 42 U/L
ALT SERPL W/O P-5'-P-CCNC: 43 U/L
ALT SERPL W/O P-5'-P-CCNC: 46 U/L
ALT SERPL W/O P-5'-P-CCNC: 5 U/L
ALT SERPL W/O P-5'-P-CCNC: 51 U/L
ALT SERPL W/O P-5'-P-CCNC: 6 U/L
ALT SERPL W/O P-5'-P-CCNC: 7 U/L
ALT SERPL W/O P-5'-P-CCNC: 8 U/L
ALT SERPL W/O P-5'-P-CCNC: 9 U/L
AMMONIA PLAS-SCNC: 18 UMOL/L
AMYLASE SERPL-CCNC: 31 U/L
ANION GAP SERPL CALC-SCNC: 10 MMOL/L
ANION GAP SERPL CALC-SCNC: 11 MMOL/L
ANION GAP SERPL CALC-SCNC: 12 MMOL/L
ANION GAP SERPL CALC-SCNC: 13 MMOL/L
ANION GAP SERPL CALC-SCNC: 14 MMOL/L
ANION GAP SERPL CALC-SCNC: 15 MMOL/L
ANION GAP SERPL CALC-SCNC: 16 MMOL/L
ANION GAP SERPL CALC-SCNC: 17 MMOL/L
ANION GAP SERPL CALC-SCNC: 18 MMOL/L
ANION GAP SERPL CALC-SCNC: 19 MMOL/L
ANION GAP SERPL CALC-SCNC: 20 MMOL/L
ANION GAP SERPL CALC-SCNC: 21 MMOL/L
ANION GAP SERPL CALC-SCNC: 22 MMOL/L
ANION GAP SERPL CALC-SCNC: 23 MMOL/L
ANION GAP SERPL CALC-SCNC: 24 MMOL/L
ANION GAP SERPL CALC-SCNC: 27 MMOL/L
ANION GAP SERPL CALC-SCNC: 5 MMOL/L
ANION GAP SERPL CALC-SCNC: 5 MMOL/L
ANION GAP SERPL CALC-SCNC: 7 MMOL/L
ANION GAP SERPL CALC-SCNC: 8 MMOL/L
ANION GAP SERPL CALC-SCNC: 9 MMOL/L
ANISOCYTOSIS BLD QL SMEAR: ABNORMAL
ANISOCYTOSIS BLD QL SMEAR: SLIGHT
APPEARANCE FLD: NORMAL
APTT BLDCRRT: 25.5 SEC
APTT BLDCRRT: 28.3 SEC
ASPERGILLUS AB SER QL ID: NORMAL
AST SERPL-CCNC: 10 U/L
AST SERPL-CCNC: 10 U/L
AST SERPL-CCNC: 101 U/L
AST SERPL-CCNC: 11 U/L
AST SERPL-CCNC: 12 U/L
AST SERPL-CCNC: 123 U/L
AST SERPL-CCNC: 124 U/L
AST SERPL-CCNC: 13 U/L
AST SERPL-CCNC: 14 U/L
AST SERPL-CCNC: 15 U/L
AST SERPL-CCNC: 16 U/L
AST SERPL-CCNC: 17 U/L
AST SERPL-CCNC: 18 U/L
AST SERPL-CCNC: 19 U/L
AST SERPL-CCNC: 20 U/L
AST SERPL-CCNC: 21 U/L
AST SERPL-CCNC: 23 U/L
AST SERPL-CCNC: 23 U/L
AST SERPL-CCNC: 24 U/L
AST SERPL-CCNC: 25 U/L
AST SERPL-CCNC: 25 U/L
AST SERPL-CCNC: 26 U/L
AST SERPL-CCNC: 26 U/L
AST SERPL-CCNC: 27 U/L
AST SERPL-CCNC: 28 U/L
AST SERPL-CCNC: 31 U/L
AST SERPL-CCNC: 32 U/L
AST SERPL-CCNC: 32 U/L
AST SERPL-CCNC: 35 U/L
AST SERPL-CCNC: 35 U/L
AST SERPL-CCNC: 36 U/L
AST SERPL-CCNC: 37 U/L
AST SERPL-CCNC: 42 U/L
AST SERPL-CCNC: 43 U/L
AST SERPL-CCNC: 45 U/L
AST SERPL-CCNC: 51 U/L
AST SERPL-CCNC: 57 U/L
AST SERPL-CCNC: 67 U/L
AST SERPL-CCNC: 76 U/L
AST SERPL-CCNC: 92 U/L
B DERMAT AB SER QL ID: NORMAL
B-OH-BUTYR BLD STRIP-SCNC: 4.2 MMOL/L
B-OH-BUTYR BLD STRIP-SCNC: 5.7 MMOL/L
B-OH-BUTYR BLD STRIP-SCNC: 6.2 MMOL/L
BACTERIA #/AREA URNS AUTO: ABNORMAL /HPF
BACTERIA BLD CULT: NORMAL
BACTERIA SPEC AEROBE CULT: NO GROWTH
BACTERIA SPEC AEROBE CULT: NORMAL
BACTERIA SPEC ANAEROBE CULT: NORMAL
BACTERIA STL CULT: NORMAL
BASO STIPL BLD QL SMEAR: ABNORMAL
BASOPHILS # BLD AUTO: 0 K/UL
BASOPHILS # BLD AUTO: 0.01 K/UL
BASOPHILS # BLD AUTO: 0.02 K/UL
BASOPHILS # BLD AUTO: 0.03 K/UL
BASOPHILS # BLD AUTO: 0.04 K/UL
BASOPHILS # BLD AUTO: 0.05 K/UL
BASOPHILS # BLD AUTO: 0.05 K/UL
BASOPHILS # BLD AUTO: ABNORMAL K/UL
BASOPHILS NFR BLD: 0 %
BASOPHILS NFR BLD: 0.1 %
BASOPHILS NFR BLD: 0.1 %
BASOPHILS NFR BLD: 0.2 %
BASOPHILS NFR BLD: 0.3 %
BASOPHILS NFR BLD: 0.4 %
BASOPHILS NFR BLD: 0.5 %
BASOPHILS NFR BLD: 0.6 %
BASOPHILS NFR BLD: 0.7 %
BASOPHILS NFR BLD: 0.8 %
BASOPHILS NFR BLD: 0.8 %
BASOPHILS NFR BLD: 0.9 %
BASOPHILS NFR BLD: 0.9 %
BASOPHILS NFR BLD: 1 %
BILIRUB DIRECT SERPL-MCNC: 0.9 MG/DL
BILIRUB SERPL-MCNC: 0.3 MG/DL
BILIRUB SERPL-MCNC: 0.4 MG/DL
BILIRUB SERPL-MCNC: 0.5 MG/DL
BILIRUB SERPL-MCNC: 0.6 MG/DL
BILIRUB SERPL-MCNC: 0.7 MG/DL
BILIRUB SERPL-MCNC: 0.8 MG/DL
BILIRUB SERPL-MCNC: 0.9 MG/DL
BILIRUB SERPL-MCNC: 1.1 MG/DL
BILIRUB SERPL-MCNC: 1.2 MG/DL
BILIRUB SERPL-MCNC: 1.3 MG/DL
BILIRUB SERPL-MCNC: 1.4 MG/DL
BILIRUB SERPL-MCNC: 1.4 MG/DL
BILIRUB SERPL-MCNC: 1.6 MG/DL
BILIRUB SERPL-MCNC: 1.7 MG/DL
BILIRUB SERPL-MCNC: 1.7 MG/DL
BILIRUB SERPL-MCNC: 1.8 MG/DL
BILIRUB SERPL-MCNC: 2.1 MG/DL
BILIRUB SERPL-MCNC: 2.2 MG/DL
BILIRUB SERPL-MCNC: 2.3 MG/DL
BILIRUB SERPL-MCNC: 2.4 MG/DL
BILIRUB SERPL-MCNC: 2.6 MG/DL
BILIRUB SERPL-MCNC: 3.1 MG/DL
BILIRUB UR QL STRIP: NEGATIVE
BILIRUB UR QL STRIP: NEGATIVE
BLASTOMYCES AG INTERP: NEGATIVE
BLASTOMYCES AG RESULT: NORMAL NG/ML
BLASTOMYCES AG SPECIMEN: NORMAL
BLD GP AB SCN CELLS X3 SERPL QL: NORMAL
BLD PROD TYP BPU: NORMAL
BLOOD UNIT EXPIRATION DATE: NORMAL
BLOOD UNIT TYPE CODE: 1700
BLOOD UNIT TYPE CODE: 1700
BLOOD UNIT TYPE CODE: 5100
BLOOD UNIT TYPE CODE: 6200
BLOOD UNIT TYPE CODE: 7300
BLOOD UNIT TYPE CODE: NORMAL
BLOOD UNIT TYPE: NORMAL
BNP SERPL-MCNC: 1228 PG/ML
BNP SERPL-MCNC: 279 PG/ML
BNP SERPL-MCNC: 284 PG/ML
BNP SERPL-MCNC: 357 PG/ML
BNP SERPL-MCNC: 359 PG/ML
BODY FLD TYPE: NORMAL
BUN SERPL-MCNC: 10 MG/DL
BUN SERPL-MCNC: 11 MG/DL
BUN SERPL-MCNC: 12 MG/DL
BUN SERPL-MCNC: 13 MG/DL
BUN SERPL-MCNC: 14 MG/DL
BUN SERPL-MCNC: 15 MG/DL
BUN SERPL-MCNC: 16 MG/DL
BUN SERPL-MCNC: 17 MG/DL
BUN SERPL-MCNC: 19 MG/DL
BUN SERPL-MCNC: 2 MG/DL
BUN SERPL-MCNC: 20 MG/DL
BUN SERPL-MCNC: 22 MG/DL
BUN SERPL-MCNC: 24 MG/DL
BUN SERPL-MCNC: 25 MG/DL
BUN SERPL-MCNC: 26 MG/DL
BUN SERPL-MCNC: 27 MG/DL
BUN SERPL-MCNC: 28 MG/DL
BUN SERPL-MCNC: 29 MG/DL
BUN SERPL-MCNC: 29 MG/DL
BUN SERPL-MCNC: 3 MG/DL
BUN SERPL-MCNC: 30 MG/DL
BUN SERPL-MCNC: 31 MG/DL
BUN SERPL-MCNC: 32 MG/DL
BUN SERPL-MCNC: 32 MG/DL
BUN SERPL-MCNC: 33 MG/DL
BUN SERPL-MCNC: 35 MG/DL
BUN SERPL-MCNC: 36 MG/DL
BUN SERPL-MCNC: 37 MG/DL
BUN SERPL-MCNC: 38 MG/DL
BUN SERPL-MCNC: 39 MG/DL
BUN SERPL-MCNC: 39 MG/DL
BUN SERPL-MCNC: 4 MG/DL
BUN SERPL-MCNC: 40 MG/DL
BUN SERPL-MCNC: 41 MG/DL
BUN SERPL-MCNC: 43 MG/DL
BUN SERPL-MCNC: 44 MG/DL
BUN SERPL-MCNC: 45 MG/DL
BUN SERPL-MCNC: 46 MG/DL
BUN SERPL-MCNC: 49 MG/DL
BUN SERPL-MCNC: 5 MG/DL
BUN SERPL-MCNC: 50 MG/DL
BUN SERPL-MCNC: 51 MG/DL
BUN SERPL-MCNC: 52 MG/DL
BUN SERPL-MCNC: 53 MG/DL
BUN SERPL-MCNC: 53 MG/DL
BUN SERPL-MCNC: 55 MG/DL
BUN SERPL-MCNC: 57 MG/DL
BUN SERPL-MCNC: 57 MG/DL
BUN SERPL-MCNC: 6 MG/DL
BUN SERPL-MCNC: 62 MG/DL
BUN SERPL-MCNC: 7 MG/DL
BUN SERPL-MCNC: 8 MG/DL
BUN SERPL-MCNC: 9 MG/DL
BURR CELLS BLD QL SMEAR: ABNORMAL
C DIFF GDH STL QL: NEGATIVE
C DIFF GDH STL QL: NEGATIVE
C DIFF GDH STL QL: POSITIVE
C DIFF TOX A+B STL QL IA: NEGATIVE
C DIFF TOX GENS STL QL NAA+PROBE: NEGATIVE
C IMMITIS AB SER QL ID: NORMAL
C PEPTIDE SERPL-MCNC: <0.08 NG/ML
C1Q1 TESTING DATE: NORMAL
C1Q1 TESTING DATE: NORMAL
C1Q2 TESTING DATE: NORMAL
CA-I BLDV-SCNC: 0.98 MMOL/L
CALCIUM SERPL-MCNC: 10 MG/DL
CALCIUM SERPL-MCNC: 10.1 MG/DL
CALCIUM SERPL-MCNC: 10.3 MG/DL
CALCIUM SERPL-MCNC: 10.6 MG/DL
CALCIUM SERPL-MCNC: 10.6 MG/DL
CALCIUM SERPL-MCNC: 6.5 MG/DL
CALCIUM SERPL-MCNC: 7.3 MG/DL
CALCIUM SERPL-MCNC: 7.4 MG/DL
CALCIUM SERPL-MCNC: 7.4 MG/DL
CALCIUM SERPL-MCNC: 7.5 MG/DL
CALCIUM SERPL-MCNC: 7.8 MG/DL
CALCIUM SERPL-MCNC: 7.8 MG/DL
CALCIUM SERPL-MCNC: 7.9 MG/DL
CALCIUM SERPL-MCNC: 8 MG/DL
CALCIUM SERPL-MCNC: 8.1 MG/DL
CALCIUM SERPL-MCNC: 8.2 MG/DL
CALCIUM SERPL-MCNC: 8.3 MG/DL
CALCIUM SERPL-MCNC: 8.4 MG/DL
CALCIUM SERPL-MCNC: 8.5 MG/DL
CALCIUM SERPL-MCNC: 8.6 MG/DL
CALCIUM SERPL-MCNC: 8.7 MG/DL
CALCIUM SERPL-MCNC: 8.8 MG/DL
CALCIUM SERPL-MCNC: 8.9 MG/DL
CALCIUM SERPL-MCNC: 9 MG/DL
CALCIUM SERPL-MCNC: 9.1 MG/DL
CALCIUM SERPL-MCNC: 9.1 MG/DL
CALCIUM SERPL-MCNC: 9.2 MG/DL
CALCIUM SERPL-MCNC: 9.3 MG/DL
CALCIUM SERPL-MCNC: 9.4 MG/DL
CALCIUM SERPL-MCNC: 9.4 MG/DL
CALCIUM SERPL-MCNC: 9.5 MG/DL
CALCIUM SERPL-MCNC: 9.6 MG/DL
CALCIUM SERPL-MCNC: 9.7 MG/DL
CALCIUM SERPL-MCNC: 9.8 MG/DL
CALCIUM SERPL-MCNC: 9.9 MG/DL
CHLORIDE SERPL-SCNC: 100 MMOL/L
CHLORIDE SERPL-SCNC: 101 MMOL/L
CHLORIDE SERPL-SCNC: 102 MMOL/L
CHLORIDE SERPL-SCNC: 103 MMOL/L
CHLORIDE SERPL-SCNC: 104 MMOL/L
CHLORIDE SERPL-SCNC: 105 MMOL/L
CHLORIDE SERPL-SCNC: 106 MMOL/L
CHLORIDE SERPL-SCNC: 107 MMOL/L
CHLORIDE SERPL-SCNC: 108 MMOL/L
CHLORIDE SERPL-SCNC: 109 MMOL/L
CHLORIDE SERPL-SCNC: 111 MMOL/L
CHLORIDE SERPL-SCNC: 113 MMOL/L
CHLORIDE SERPL-SCNC: 117 MMOL/L
CHLORIDE SERPL-SCNC: 93 MMOL/L
CHLORIDE SERPL-SCNC: 93 MMOL/L
CHLORIDE SERPL-SCNC: 94 MMOL/L
CHLORIDE SERPL-SCNC: 95 MMOL/L
CHLORIDE SERPL-SCNC: 96 MMOL/L
CHLORIDE SERPL-SCNC: 97 MMOL/L
CHLORIDE SERPL-SCNC: 98 MMOL/L
CHLORIDE SERPL-SCNC: 99 MMOL/L
CHOLEST SERPL-MCNC: 69 MG/DL
CHOLEST/HDLC SERPL: ABNORMAL {RATIO}
CK SERPL-CCNC: 25 U/L
CK SERPL-CCNC: 45 U/L
CK SERPL-CCNC: 50 U/L
CLARITY UR REFRACT.AUTO: ABNORMAL
CLARITY UR REFRACT.AUTO: CLEAR
CLASS I ANTIBODY COMMENTS - LUMINEX: NORMAL
CLASS I ANTIBODY COMMENTS - LUMINEX: NORMAL
CLASS II ANTIBODY COMMENTS - LUMINEX: NORMAL
CLASS II ANTIBODY COMMENTS - LUMINEX: NORMAL
CMV DNA SERPL NAA+PROBE-ACNC: <137 IU/ML
CMV DNA SERPL NAA+PROBE-ACNC: NORMAL IU/ML
CMV DNA SPEC QL NAA+PROBE: NOT DETECTED
CO2 SERPL-SCNC: 13 MMOL/L
CO2 SERPL-SCNC: 14 MMOL/L
CO2 SERPL-SCNC: 15 MMOL/L
CO2 SERPL-SCNC: 16 MMOL/L
CO2 SERPL-SCNC: 17 MMOL/L
CO2 SERPL-SCNC: 18 MMOL/L
CO2 SERPL-SCNC: 19 MMOL/L
CO2 SERPL-SCNC: 20 MMOL/L
CO2 SERPL-SCNC: 21 MMOL/L
CO2 SERPL-SCNC: 22 MMOL/L
CO2 SERPL-SCNC: 23 MMOL/L
CO2 SERPL-SCNC: 24 MMOL/L
CO2 SERPL-SCNC: 25 MMOL/L
CO2 SERPL-SCNC: 26 MMOL/L
CO2 SERPL-SCNC: 27 MMOL/L
CO2 SERPL-SCNC: 28 MMOL/L
CO2 SERPL-SCNC: 29 MMOL/L
CO2 SERPL-SCNC: 30 MMOL/L
CO2 SERPL-SCNC: 31 MMOL/L
CODING SYSTEM: NORMAL
COLOR FLD: NORMAL
COLOR FLD: YELLOW
COLOR FLD: YELLOW
COLOR UR AUTO: ABNORMAL
COLOR UR AUTO: YELLOW
CORTIS SERPL-MCNC: 10.7 UG/DL
CORTIS SERPL-MCNC: 7.8 UG/DL
CORTIS SERPL-MCNC: >110 UG/DL
CREAT SERPL-MCNC: 0.4 MG/DL
CREAT SERPL-MCNC: 0.4 MG/DL
CREAT SERPL-MCNC: 0.5 MG/DL
CREAT SERPL-MCNC: 0.6 MG/DL
CREAT SERPL-MCNC: 0.7 MG/DL
CREAT SERPL-MCNC: 0.8 MG/DL
CREAT SERPL-MCNC: 0.9 MG/DL
CREAT SERPL-MCNC: 1 MG/DL
CREAT SERPL-MCNC: 1.1 MG/DL
CREAT SERPL-MCNC: 1.2 MG/DL
CREAT SERPL-MCNC: 1.3 MG/DL
CREAT SERPL-MCNC: 1.4 MG/DL
CREAT SERPL-MCNC: 1.5 MG/DL
CREAT SERPL-MCNC: 1.6 MG/DL
CREAT SERPL-MCNC: 1.7 MG/DL
CREAT SERPL-MCNC: 1.8 MG/DL
CREAT SERPL-MCNC: 1.9 MG/DL
CREAT SERPL-MCNC: 2 MG/DL
CREAT SERPL-MCNC: 2.1 MG/DL
CREAT SERPL-MCNC: 2.2 MG/DL
CREAT SERPL-MCNC: 2.3 MG/DL
CREAT SERPL-MCNC: 2.4 MG/DL
CREAT SERPL-MCNC: 2.4 MG/DL
CREAT SERPL-MCNC: 2.5 MG/DL
CREAT SERPL-MCNC: 2.6 MG/DL
CREAT SERPL-MCNC: 2.7 MG/DL
CREAT SERPL-MCNC: 2.7 MG/DL
CREAT SERPL-MCNC: 2.8 MG/DL
CREAT SERPL-MCNC: 2.9 MG/DL
CREAT SERPL-MCNC: 3 MG/DL
CREAT SERPL-MCNC: 3.1 MG/DL
CREAT SERPL-MCNC: 3.1 MG/DL
CREAT SERPL-MCNC: 3.2 MG/DL
CREAT SERPL-MCNC: 3.3 MG/DL
CREAT SERPL-MCNC: 3.4 MG/DL
CREAT SERPL-MCNC: 3.5 MG/DL
CREAT SERPL-MCNC: 3.6 MG/DL
CREAT SERPL-MCNC: 3.7 MG/DL
CREAT SERPL-MCNC: 3.9 MG/DL
CREAT SERPL-MCNC: 3.9 MG/DL
CREAT SERPL-MCNC: 4 MG/DL
CREAT SERPL-MCNC: 4.1 MG/DL
CREAT SERPL-MCNC: 4.1 MG/DL
CREAT SERPL-MCNC: 4.2 MG/DL
CREAT SERPL-MCNC: 4.2 MG/DL
CREAT UR-MCNC: 154 MG/DL
CRYPTOC AG SER QL LA: NEGATIVE
CRYPTOC AG SER QL LA: NEGATIVE
CRYPTOSP AG STL QL IA: NEGATIVE
D DIMER PPP IA.FEU-MCNC: >33 MG/L FEU
D DIMER PPP IA.FEU-MCNC: >33 MG/L FEU
DACRYOCYTES BLD QL SMEAR: ABNORMAL
DELSYS: ABNORMAL
DELSYS: NORMAL
DEPRECATED SRA 0.1U/ML PORCINE HEPARIN S: 3 %
DEPRECATED SRA 100U/ML PORCINE HEPARIN S: 2 %
DIASTOLIC DYSFUNCTION: NO
DIFFERENTIAL METHOD: ABNORMAL
DISPENSE STATUS: NORMAL
DOHLE BOD BLD QL SMEAR: PRESENT
DSA1 TESTING DATE: NORMAL
DSA12 TESTING DATE: NORMAL
DSA2 TESTING DATE: NORMAL
ENTEROVIRUS: NOT DETECTED
EOSINOPHIL # BLD AUTO: 0 K/UL
EOSINOPHIL # BLD AUTO: 0.1 K/UL
EOSINOPHIL # BLD AUTO: 0.2 K/UL
EOSINOPHIL # BLD AUTO: ABNORMAL K/UL
EOSINOPHIL NFR BLD: 0 %
EOSINOPHIL NFR BLD: 0.2 %
EOSINOPHIL NFR BLD: 0.3 %
EOSINOPHIL NFR BLD: 0.4 %
EOSINOPHIL NFR BLD: 0.5 %
EOSINOPHIL NFR BLD: 0.7 %
EOSINOPHIL NFR BLD: 0.8 %
EOSINOPHIL NFR BLD: 0.9 %
EOSINOPHIL NFR BLD: 0.9 %
EOSINOPHIL NFR BLD: 1 %
EOSINOPHIL NFR BLD: 1.2 %
EOSINOPHIL NFR BLD: 1.2 %
EOSINOPHIL NFR BLD: 1.3 %
EOSINOPHIL NFR BLD: 1.3 %
EOSINOPHIL NFR BLD: 1.5 %
EOSINOPHIL NFR BLD: 1.5 %
EOSINOPHIL NFR BLD: 1.8 %
EOSINOPHIL NFR BLD: 1.9 %
EOSINOPHIL NFR BLD: 1.9 %
EOSINOPHIL NFR BLD: 2 %
EOSINOPHIL NFR BLD: 2.2 %
EOSINOPHIL NFR BLD: 2.4 %
EOSINOPHIL NFR BLD: 2.6 %
EOSINOPHIL NFR BLD: 2.7 %
EOSINOPHIL NFR BLD: 3 %
EOSINOPHIL NFR BLD: 3.1 %
EOSINOPHIL NFR BLD: 3.9 %
EOSINOPHIL NFR BLD: 4 %
EOSINOPHIL NFR BLD: 4.2 %
EOSINOPHIL NFR BLD: 4.3 %
EOSINOPHIL NFR BLD: 4.5 %
EOSINOPHIL NFR BLD: 4.9 %
EOSINOPHIL NFR FLD MANUAL: 1 %
EP: 5
EP: 5
ERYTHROCYTE [DISTWIDTH] IN BLOOD BY AUTOMATED COUNT: 13.2 %
ERYTHROCYTE [DISTWIDTH] IN BLOOD BY AUTOMATED COUNT: 13.5 %
ERYTHROCYTE [DISTWIDTH] IN BLOOD BY AUTOMATED COUNT: 13.6 %
ERYTHROCYTE [DISTWIDTH] IN BLOOD BY AUTOMATED COUNT: 13.7 %
ERYTHROCYTE [DISTWIDTH] IN BLOOD BY AUTOMATED COUNT: 13.7 %
ERYTHROCYTE [DISTWIDTH] IN BLOOD BY AUTOMATED COUNT: 13.9 %
ERYTHROCYTE [DISTWIDTH] IN BLOOD BY AUTOMATED COUNT: 13.9 %
ERYTHROCYTE [DISTWIDTH] IN BLOOD BY AUTOMATED COUNT: 15.1 %
ERYTHROCYTE [DISTWIDTH] IN BLOOD BY AUTOMATED COUNT: 15.8 %
ERYTHROCYTE [DISTWIDTH] IN BLOOD BY AUTOMATED COUNT: 16 %
ERYTHROCYTE [DISTWIDTH] IN BLOOD BY AUTOMATED COUNT: 16.2 %
ERYTHROCYTE [DISTWIDTH] IN BLOOD BY AUTOMATED COUNT: 16.5 %
ERYTHROCYTE [DISTWIDTH] IN BLOOD BY AUTOMATED COUNT: 16.6 %
ERYTHROCYTE [DISTWIDTH] IN BLOOD BY AUTOMATED COUNT: 16.6 %
ERYTHROCYTE [DISTWIDTH] IN BLOOD BY AUTOMATED COUNT: 16.7 %
ERYTHROCYTE [DISTWIDTH] IN BLOOD BY AUTOMATED COUNT: 16.7 %
ERYTHROCYTE [DISTWIDTH] IN BLOOD BY AUTOMATED COUNT: 16.8 %
ERYTHROCYTE [DISTWIDTH] IN BLOOD BY AUTOMATED COUNT: 16.8 %
ERYTHROCYTE [DISTWIDTH] IN BLOOD BY AUTOMATED COUNT: 16.9 %
ERYTHROCYTE [DISTWIDTH] IN BLOOD BY AUTOMATED COUNT: 17 %
ERYTHROCYTE [DISTWIDTH] IN BLOOD BY AUTOMATED COUNT: 17 %
ERYTHROCYTE [DISTWIDTH] IN BLOOD BY AUTOMATED COUNT: 17.1 %
ERYTHROCYTE [DISTWIDTH] IN BLOOD BY AUTOMATED COUNT: 17.2 %
ERYTHROCYTE [DISTWIDTH] IN BLOOD BY AUTOMATED COUNT: 17.3 %
ERYTHROCYTE [DISTWIDTH] IN BLOOD BY AUTOMATED COUNT: 17.5 %
ERYTHROCYTE [DISTWIDTH] IN BLOOD BY AUTOMATED COUNT: 17.6 %
ERYTHROCYTE [DISTWIDTH] IN BLOOD BY AUTOMATED COUNT: 17.7 %
ERYTHROCYTE [DISTWIDTH] IN BLOOD BY AUTOMATED COUNT: 17.7 %
ERYTHROCYTE [DISTWIDTH] IN BLOOD BY AUTOMATED COUNT: 17.8 %
ERYTHROCYTE [DISTWIDTH] IN BLOOD BY AUTOMATED COUNT: 17.8 %
ERYTHROCYTE [DISTWIDTH] IN BLOOD BY AUTOMATED COUNT: 17.9 %
ERYTHROCYTE [DISTWIDTH] IN BLOOD BY AUTOMATED COUNT: 18 %
ERYTHROCYTE [DISTWIDTH] IN BLOOD BY AUTOMATED COUNT: 18 %
ERYTHROCYTE [DISTWIDTH] IN BLOOD BY AUTOMATED COUNT: 18.3 %
ERYTHROCYTE [DISTWIDTH] IN BLOOD BY AUTOMATED COUNT: 18.4 %
ERYTHROCYTE [DISTWIDTH] IN BLOOD BY AUTOMATED COUNT: 18.6 %
ERYTHROCYTE [DISTWIDTH] IN BLOOD BY AUTOMATED COUNT: 18.6 %
ERYTHROCYTE [DISTWIDTH] IN BLOOD BY AUTOMATED COUNT: 18.7 %
ERYTHROCYTE [DISTWIDTH] IN BLOOD BY AUTOMATED COUNT: 18.8 %
ERYTHROCYTE [DISTWIDTH] IN BLOOD BY AUTOMATED COUNT: 18.8 %
ERYTHROCYTE [DISTWIDTH] IN BLOOD BY AUTOMATED COUNT: 18.9 %
ERYTHROCYTE [DISTWIDTH] IN BLOOD BY AUTOMATED COUNT: 19 %
ERYTHROCYTE [DISTWIDTH] IN BLOOD BY AUTOMATED COUNT: 19 %
ERYTHROCYTE [DISTWIDTH] IN BLOOD BY AUTOMATED COUNT: 19.1 %
ERYTHROCYTE [DISTWIDTH] IN BLOOD BY AUTOMATED COUNT: 19.1 %
ERYTHROCYTE [DISTWIDTH] IN BLOOD BY AUTOMATED COUNT: 19.2 %
ERYTHROCYTE [DISTWIDTH] IN BLOOD BY AUTOMATED COUNT: 19.3 %
ERYTHROCYTE [DISTWIDTH] IN BLOOD BY AUTOMATED COUNT: 19.3 %
ERYTHROCYTE [DISTWIDTH] IN BLOOD BY AUTOMATED COUNT: 19.5 %
ERYTHROCYTE [DISTWIDTH] IN BLOOD BY AUTOMATED COUNT: 19.6 %
ERYTHROCYTE [DISTWIDTH] IN BLOOD BY AUTOMATED COUNT: 19.6 %
ERYTHROCYTE [DISTWIDTH] IN BLOOD BY AUTOMATED COUNT: 19.7 %
ERYTHROCYTE [DISTWIDTH] IN BLOOD BY AUTOMATED COUNT: 19.9 %
ERYTHROCYTE [DISTWIDTH] IN BLOOD BY AUTOMATED COUNT: 20 %
ERYTHROCYTE [DISTWIDTH] IN BLOOD BY AUTOMATED COUNT: 20 %
ERYTHROCYTE [DISTWIDTH] IN BLOOD BY AUTOMATED COUNT: 20.1 %
ERYTHROCYTE [DISTWIDTH] IN BLOOD BY AUTOMATED COUNT: 20.2 %
ERYTHROCYTE [DISTWIDTH] IN BLOOD BY AUTOMATED COUNT: 20.2 %
ERYTHROCYTE [DISTWIDTH] IN BLOOD BY AUTOMATED COUNT: 20.4 %
ERYTHROCYTE [DISTWIDTH] IN BLOOD BY AUTOMATED COUNT: 20.6 %
ERYTHROCYTE [DISTWIDTH] IN BLOOD BY AUTOMATED COUNT: 20.7 %
ERYTHROCYTE [DISTWIDTH] IN BLOOD BY AUTOMATED COUNT: 20.8 %
ERYTHROCYTE [DISTWIDTH] IN BLOOD BY AUTOMATED COUNT: 20.9 %
ERYTHROCYTE [DISTWIDTH] IN BLOOD BY AUTOMATED COUNT: 21.1 %
ERYTHROCYTE [DISTWIDTH] IN BLOOD BY AUTOMATED COUNT: 21.2 %
ERYTHROCYTE [DISTWIDTH] IN BLOOD BY AUTOMATED COUNT: 21.3 %
ERYTHROCYTE [DISTWIDTH] IN BLOOD BY AUTOMATED COUNT: 21.4 %
ERYTHROCYTE [DISTWIDTH] IN BLOOD BY AUTOMATED COUNT: 21.5 %
ERYTHROCYTE [DISTWIDTH] IN BLOOD BY AUTOMATED COUNT: 21.6 %
ERYTHROCYTE [DISTWIDTH] IN BLOOD BY AUTOMATED COUNT: 21.7 %
ERYTHROCYTE [DISTWIDTH] IN BLOOD BY AUTOMATED COUNT: 21.8 %
ERYTHROCYTE [DISTWIDTH] IN BLOOD BY AUTOMATED COUNT: 21.8 %
ERYTHROCYTE [DISTWIDTH] IN BLOOD BY AUTOMATED COUNT: 21.9 %
ERYTHROCYTE [DISTWIDTH] IN BLOOD BY AUTOMATED COUNT: 22 %
ERYTHROCYTE [DISTWIDTH] IN BLOOD BY AUTOMATED COUNT: 22.2 %
ERYTHROCYTE [DISTWIDTH] IN BLOOD BY AUTOMATED COUNT: 22.9 %
ERYTHROCYTE [DISTWIDTH] IN BLOOD BY AUTOMATED COUNT: 24 %
ERYTHROCYTE [SEDIMENTATION RATE] IN BLOOD BY WESTERGREN METHOD: 12 MM/H
ERYTHROCYTE [SEDIMENTATION RATE] IN BLOOD BY WESTERGREN METHOD: 12 MM/H
ERYTHROCYTE [SEDIMENTATION RATE] IN BLOOD BY WESTERGREN METHOD: 14 MM/H
ERYTHROCYTE [SEDIMENTATION RATE] IN BLOOD BY WESTERGREN METHOD: 16 MM/H
ERYTHROCYTE [SEDIMENTATION RATE] IN BLOOD BY WESTERGREN METHOD: 18 MM/H
ERYTHROCYTE [SEDIMENTATION RATE] IN BLOOD BY WESTERGREN METHOD: 20 MM/H
ERYTHROCYTE [SEDIMENTATION RATE] IN BLOOD BY WESTERGREN METHOD: 21 MM/H
ERYTHROCYTE [SEDIMENTATION RATE] IN BLOOD BY WESTERGREN METHOD: 22 MM/H
ERYTHROCYTE [SEDIMENTATION RATE] IN BLOOD BY WESTERGREN METHOD: 23 MM/H
ERYTHROCYTE [SEDIMENTATION RATE] IN BLOOD BY WESTERGREN METHOD: 23 MM/H
ERYTHROCYTE [SEDIMENTATION RATE] IN BLOOD BY WESTERGREN METHOD: 24 MM/H
ERYTHROCYTE [SEDIMENTATION RATE] IN BLOOD BY WESTERGREN METHOD: 24 MM/H
EST. GFR  (AFRICAN AMERICAN): 18.6 ML/MIN/1.73 M^2
EST. GFR  (AFRICAN AMERICAN): 18.6 ML/MIN/1.73 M^2
EST. GFR  (AFRICAN AMERICAN): 19.1 ML/MIN/1.73 M^2
EST. GFR  (AFRICAN AMERICAN): 19.1 ML/MIN/1.73 M^2
EST. GFR  (AFRICAN AMERICAN): 19.7 ML/MIN/1.73 M^2
EST. GFR  (AFRICAN AMERICAN): 20.3 ML/MIN/1.73 M^2
EST. GFR  (AFRICAN AMERICAN): 20.3 ML/MIN/1.73 M^2
EST. GFR  (AFRICAN AMERICAN): 21.7 ML/MIN/1.73 M^2
EST. GFR  (AFRICAN AMERICAN): 22.4 ML/MIN/1.73 M^2
EST. GFR  (AFRICAN AMERICAN): 23.2 ML/MIN/1.73 M^2
EST. GFR  (AFRICAN AMERICAN): 24 ML/MIN/1.73 M^2
EST. GFR  (AFRICAN AMERICAN): 24.9 ML/MIN/1.73 M^2
EST. GFR  (AFRICAN AMERICAN): 25.8 ML/MIN/1.73 M^2
EST. GFR  (AFRICAN AMERICAN): 26.8 ML/MIN/1.73 M^2
EST. GFR  (AFRICAN AMERICAN): 26.8 ML/MIN/1.73 M^2
EST. GFR  (AFRICAN AMERICAN): 27.9 ML/MIN/1.73 M^2
EST. GFR  (AFRICAN AMERICAN): 29.1 ML/MIN/1.73 M^2
EST. GFR  (AFRICAN AMERICAN): 30.3 ML/MIN/1.73 M^2
EST. GFR  (AFRICAN AMERICAN): 31.7 ML/MIN/1.73 M^2
EST. GFR  (AFRICAN AMERICAN): 31.7 ML/MIN/1.73 M^2
EST. GFR  (AFRICAN AMERICAN): 33.2 ML/MIN/1.73 M^2
EST. GFR  (AFRICAN AMERICAN): 34.8 ML/MIN/1.73 M^2
EST. GFR  (AFRICAN AMERICAN): 36.6 ML/MIN/1.73 M^2
EST. GFR  (AFRICAN AMERICAN): 36.6 ML/MIN/1.73 M^2
EST. GFR  (AFRICAN AMERICAN): 38.5 ML/MIN/1.73 M^2
EST. GFR  (AFRICAN AMERICAN): 40.6 ML/MIN/1.73 M^2
EST. GFR  (AFRICAN AMERICAN): 43 ML/MIN/1.73 M^2
EST. GFR  (AFRICAN AMERICAN): 45.6 ML/MIN/1.73 M^2
EST. GFR  (AFRICAN AMERICAN): 48.5 ML/MIN/1.73 M^2
EST. GFR  (AFRICAN AMERICAN): 51.8 ML/MIN/1.73 M^2
EST. GFR  (AFRICAN AMERICAN): 55.5 ML/MIN/1.73 M^2
EST. GFR  (AFRICAN AMERICAN): 59.7 ML/MIN/1.73 M^2
EST. GFR  (AFRICAN AMERICAN): >60 ML/MIN/1.73 M^2
EST. GFR  (NON AFRICAN AMERICAN): 16.1 ML/MIN/1.73 M^2
EST. GFR  (NON AFRICAN AMERICAN): 16.1 ML/MIN/1.73 M^2
EST. GFR  (NON AFRICAN AMERICAN): 16.6 ML/MIN/1.73 M^2
EST. GFR  (NON AFRICAN AMERICAN): 16.6 ML/MIN/1.73 M^2
EST. GFR  (NON AFRICAN AMERICAN): 17.1 ML/MIN/1.73 M^2
EST. GFR  (NON AFRICAN AMERICAN): 17.6 ML/MIN/1.73 M^2
EST. GFR  (NON AFRICAN AMERICAN): 17.6 ML/MIN/1.73 M^2
EST. GFR  (NON AFRICAN AMERICAN): 18.7 ML/MIN/1.73 M^2
EST. GFR  (NON AFRICAN AMERICAN): 19.4 ML/MIN/1.73 M^2
EST. GFR  (NON AFRICAN AMERICAN): 20 ML/MIN/1.73 M^2
EST. GFR  (NON AFRICAN AMERICAN): 20.8 ML/MIN/1.73 M^2
EST. GFR  (NON AFRICAN AMERICAN): 21.5 ML/MIN/1.73 M^2
EST. GFR  (NON AFRICAN AMERICAN): 22.3 ML/MIN/1.73 M^2
EST. GFR  (NON AFRICAN AMERICAN): 23.2 ML/MIN/1.73 M^2
EST. GFR  (NON AFRICAN AMERICAN): 23.2 ML/MIN/1.73 M^2
EST. GFR  (NON AFRICAN AMERICAN): 24.1 ML/MIN/1.73 M^2
EST. GFR  (NON AFRICAN AMERICAN): 25.2 ML/MIN/1.73 M^2
EST. GFR  (NON AFRICAN AMERICAN): 26.2 ML/MIN/1.73 M^2
EST. GFR  (NON AFRICAN AMERICAN): 27.4 ML/MIN/1.73 M^2
EST. GFR  (NON AFRICAN AMERICAN): 27.4 ML/MIN/1.73 M^2
EST. GFR  (NON AFRICAN AMERICAN): 28.7 ML/MIN/1.73 M^2
EST. GFR  (NON AFRICAN AMERICAN): 30.1 ML/MIN/1.73 M^2
EST. GFR  (NON AFRICAN AMERICAN): 31.6 ML/MIN/1.73 M^2
EST. GFR  (NON AFRICAN AMERICAN): 31.6 ML/MIN/1.73 M^2
EST. GFR  (NON AFRICAN AMERICAN): 33.3 ML/MIN/1.73 M^2
EST. GFR  (NON AFRICAN AMERICAN): 35.1 ML/MIN/1.73 M^2
EST. GFR  (NON AFRICAN AMERICAN): 37.2 ML/MIN/1.73 M^2
EST. GFR  (NON AFRICAN AMERICAN): 39.4 ML/MIN/1.73 M^2
EST. GFR  (NON AFRICAN AMERICAN): 41.9 ML/MIN/1.73 M^2
EST. GFR  (NON AFRICAN AMERICAN): 44.8 ML/MIN/1.73 M^2
EST. GFR  (NON AFRICAN AMERICAN): 48 ML/MIN/1.73 M^2
EST. GFR  (NON AFRICAN AMERICAN): 51.6 ML/MIN/1.73 M^2
EST. GFR  (NON AFRICAN AMERICAN): 55.8 ML/MIN/1.73 M^2
EST. GFR  (NON AFRICAN AMERICAN): >60 ML/MIN/1.73 M^2
ESTIMATED AVG GLUCOSE: 100 MG/DL
ESTIMATED AVG GLUCOSE: 123 MG/DL
ESTIMATED AVG GLUCOSE: 151 MG/DL
ESTIMATED PA SYSTOLIC PRESSURE: 25.81
ESTIMATED PA SYSTOLIC PRESSURE: 31.48
ESTIMATED PA SYSTOLIC PRESSURE: 36.52
FERRITIN SERPL-MCNC: 1287 NG/ML
FIBRINOGEN PPP-MCNC: 120 MG/DL
FIBRINOGEN PPP-MCNC: 136 MG/DL
FIBRINOGEN PPP-MCNC: 164 MG/DL
FIBRINOGEN PPP-MCNC: 175 MG/DL
FIBRINOGEN PPP-MCNC: 189 MG/DL
FIBRINOGEN PPP-MCNC: 192 MG/DL
FIBRINOGEN PPP-MCNC: 253 MG/DL
FIBRINOGEN PPP-MCNC: 260 MG/DL
FIBRINOGEN PPP-MCNC: 347 MG/DL
FIBRINOGEN PPP-MCNC: 378 MG/DL
FIBRINOGEN PPP-MCNC: 402 MG/DL
FIBRINOGEN PPP-MCNC: 70 MG/DL
FIBRINOGEN PPP-MCNC: 75 MG/DL
FIBRINOGEN PPP-MCNC: 77 MG/DL
FIBRINOGEN PPP-MCNC: 87 MG/DL
FIBRINOGEN PPP-MCNC: 91 MG/DL
FIBRINOGEN PPP-MCNC: 92 MG/DL
FIBRINOGEN PPP-MCNC: 93 MG/DL
FIBRINOGEN PPP-MCNC: 99 MG/DL
FIBRINOGEN PPP-MCNC: <70 MG/DL
FIO2: 25
FIO2: 40
FIO2: 45
FIO2: 45
FIO2: 50
FIO2: 60
FIO2: 70
FLOW CYTOMETRY ANTIBODIES ANALYZED - FLUID: NORMAL
FLOW CYTOMETRY COMMENT - FLUID: NORMAL
FLOW CYTOMETRY INTERPRETATION - FLUID: NORMAL
FLOW: 15
FLOW: 65
FLUAV AG SPEC QL IA: NEGATIVE
FLUBV AG SPEC QL IA: NEGATIVE
FLUID TYPE: NORMAL
FUNGUS SPEC CULT: NORMAL
G LAMBLIA AG STL QL IA: NEGATIVE
GALACTOMANNAN AG SERPL IA-ACNC: <0.5 INDEX
GALACTOMANNAN AG SERPL IA-ACNC: <0.5 INDEX
GALACTOMANNAN AG SPEC-ACNC: <0.5 INDEX
GIANT PLATELETS BLD QL SMEAR: PRESENT
GLUCOSE SERPL-MCNC: 100 MG/DL
GLUCOSE SERPL-MCNC: 102 MG/DL
GLUCOSE SERPL-MCNC: 107 MG/DL
GLUCOSE SERPL-MCNC: 107 MG/DL
GLUCOSE SERPL-MCNC: 108 MG/DL
GLUCOSE SERPL-MCNC: 110 MG/DL
GLUCOSE SERPL-MCNC: 110 MG/DL
GLUCOSE SERPL-MCNC: 111 MG/DL
GLUCOSE SERPL-MCNC: 118 MG/DL
GLUCOSE SERPL-MCNC: 121 MG/DL
GLUCOSE SERPL-MCNC: 122 MG/DL
GLUCOSE SERPL-MCNC: 122 MG/DL
GLUCOSE SERPL-MCNC: 124 MG/DL
GLUCOSE SERPL-MCNC: 125 MG/DL
GLUCOSE SERPL-MCNC: 127 MG/DL
GLUCOSE SERPL-MCNC: 129 MG/DL
GLUCOSE SERPL-MCNC: 129 MG/DL
GLUCOSE SERPL-MCNC: 130 MG/DL
GLUCOSE SERPL-MCNC: 131 MG/DL
GLUCOSE SERPL-MCNC: 133 MG/DL
GLUCOSE SERPL-MCNC: 134 MG/DL
GLUCOSE SERPL-MCNC: 136 MG/DL
GLUCOSE SERPL-MCNC: 138 MG/DL
GLUCOSE SERPL-MCNC: 139 MG/DL
GLUCOSE SERPL-MCNC: 139 MG/DL
GLUCOSE SERPL-MCNC: 140 MG/DL
GLUCOSE SERPL-MCNC: 141 MG/DL
GLUCOSE SERPL-MCNC: 142 MG/DL
GLUCOSE SERPL-MCNC: 142 MG/DL
GLUCOSE SERPL-MCNC: 143 MG/DL
GLUCOSE SERPL-MCNC: 143 MG/DL
GLUCOSE SERPL-MCNC: 145 MG/DL
GLUCOSE SERPL-MCNC: 146 MG/DL
GLUCOSE SERPL-MCNC: 147 MG/DL
GLUCOSE SERPL-MCNC: 148 MG/DL
GLUCOSE SERPL-MCNC: 148 MG/DL
GLUCOSE SERPL-MCNC: 149 MG/DL
GLUCOSE SERPL-MCNC: 151 MG/DL
GLUCOSE SERPL-MCNC: 153 MG/DL
GLUCOSE SERPL-MCNC: 154 MG/DL
GLUCOSE SERPL-MCNC: 156 MG/DL
GLUCOSE SERPL-MCNC: 157 MG/DL
GLUCOSE SERPL-MCNC: 158 MG/DL
GLUCOSE SERPL-MCNC: 160 MG/DL
GLUCOSE SERPL-MCNC: 162 MG/DL
GLUCOSE SERPL-MCNC: 163 MG/DL
GLUCOSE SERPL-MCNC: 163 MG/DL
GLUCOSE SERPL-MCNC: 164 MG/DL
GLUCOSE SERPL-MCNC: 164 MG/DL
GLUCOSE SERPL-MCNC: 165 MG/DL
GLUCOSE SERPL-MCNC: 166 MG/DL
GLUCOSE SERPL-MCNC: 167 MG/DL
GLUCOSE SERPL-MCNC: 169 MG/DL
GLUCOSE SERPL-MCNC: 171 MG/DL
GLUCOSE SERPL-MCNC: 172 MG/DL
GLUCOSE SERPL-MCNC: 173 MG/DL
GLUCOSE SERPL-MCNC: 174 MG/DL
GLUCOSE SERPL-MCNC: 174 MG/DL
GLUCOSE SERPL-MCNC: 176 MG/DL
GLUCOSE SERPL-MCNC: 176 MG/DL
GLUCOSE SERPL-MCNC: 178 MG/DL
GLUCOSE SERPL-MCNC: 182 MG/DL
GLUCOSE SERPL-MCNC: 185 MG/DL
GLUCOSE SERPL-MCNC: 187 MG/DL
GLUCOSE SERPL-MCNC: 189 MG/DL
GLUCOSE SERPL-MCNC: 189 MG/DL
GLUCOSE SERPL-MCNC: 190 MG/DL
GLUCOSE SERPL-MCNC: 190 MG/DL
GLUCOSE SERPL-MCNC: 191 MG/DL
GLUCOSE SERPL-MCNC: 191 MG/DL
GLUCOSE SERPL-MCNC: 197 MG/DL
GLUCOSE SERPL-MCNC: 199 MG/DL
GLUCOSE SERPL-MCNC: 202 MG/DL
GLUCOSE SERPL-MCNC: 202 MG/DL
GLUCOSE SERPL-MCNC: 203 MG/DL
GLUCOSE SERPL-MCNC: 205 MG/DL
GLUCOSE SERPL-MCNC: 207 MG/DL
GLUCOSE SERPL-MCNC: 209 MG/DL
GLUCOSE SERPL-MCNC: 213 MG/DL
GLUCOSE SERPL-MCNC: 215 MG/DL
GLUCOSE SERPL-MCNC: 215 MG/DL
GLUCOSE SERPL-MCNC: 216 MG/DL
GLUCOSE SERPL-MCNC: 216 MG/DL
GLUCOSE SERPL-MCNC: 219 MG/DL
GLUCOSE SERPL-MCNC: 220 MG/DL
GLUCOSE SERPL-MCNC: 221 MG/DL
GLUCOSE SERPL-MCNC: 222 MG/DL
GLUCOSE SERPL-MCNC: 224 MG/DL
GLUCOSE SERPL-MCNC: 225 MG/DL
GLUCOSE SERPL-MCNC: 229 MG/DL
GLUCOSE SERPL-MCNC: 236 MG/DL
GLUCOSE SERPL-MCNC: 236 MG/DL
GLUCOSE SERPL-MCNC: 237 MG/DL
GLUCOSE SERPL-MCNC: 237 MG/DL
GLUCOSE SERPL-MCNC: 239 MG/DL
GLUCOSE SERPL-MCNC: 241 MG/DL
GLUCOSE SERPL-MCNC: 254 MG/DL
GLUCOSE SERPL-MCNC: 260 MG/DL
GLUCOSE SERPL-MCNC: 261 MG/DL
GLUCOSE SERPL-MCNC: 268 MG/DL
GLUCOSE SERPL-MCNC: 274 MG/DL
GLUCOSE SERPL-MCNC: 275 MG/DL
GLUCOSE SERPL-MCNC: 279 MG/DL
GLUCOSE SERPL-MCNC: 279 MG/DL
GLUCOSE SERPL-MCNC: 289 MG/DL
GLUCOSE SERPL-MCNC: 291 MG/DL
GLUCOSE SERPL-MCNC: 297 MG/DL
GLUCOSE SERPL-MCNC: 299 MG/DL
GLUCOSE SERPL-MCNC: 305 MG/DL
GLUCOSE SERPL-MCNC: 307 MG/DL
GLUCOSE SERPL-MCNC: 321 MG/DL
GLUCOSE SERPL-MCNC: 325 MG/DL
GLUCOSE SERPL-MCNC: 350 MG/DL
GLUCOSE SERPL-MCNC: 378 MG/DL
GLUCOSE SERPL-MCNC: 380 MG/DL
GLUCOSE SERPL-MCNC: 385 MG/DL
GLUCOSE SERPL-MCNC: 390 MG/DL
GLUCOSE SERPL-MCNC: 470 MG/DL
GLUCOSE SERPL-MCNC: 75 MG/DL
GLUCOSE SERPL-MCNC: 80 MG/DL
GLUCOSE SERPL-MCNC: 81 MG/DL
GLUCOSE SERPL-MCNC: 83 MG/DL
GLUCOSE SERPL-MCNC: 88 MG/DL
GLUCOSE UR QL STRIP: NEGATIVE
GLUCOSE UR QL STRIP: NEGATIVE
GPP - ADENOVIRUS 40/41: NOT DETECTED
GPP - CLOSTRIDIUM DIFFICILE TOXIN A/B: NOT DETECTED
GPP - CRYPTOSPORIDIUM: NOT DETECTED
GPP - E COLI O157: NOT DETECTED
GPP - ENTAMOEBA HISTOLYTICA: NOT DETECTED
GPP - ENTEROTOXIGENIC E COLI (ETEC): NOT DETECTED
GPP - GIARDIA LAMBLIA: NOT DETECTED
GPP - NOROVIRUS GI/GII: NOT DETECTED
GPP - ROTAVIRUS A: NOT DETECTED
GPP - SALMONELLA: NOT DETECTED
GPP - SHIGELLA: NOT DETECTED
GPP - VIBRIO CHOLERA: NOT DETECTED
GPP - YERSINIA ENTEROCOLITICA: NOT DETECTED
GRAM STN SPEC: NORMAL
H CAPSUL AB TITR SER ID: NORMAL {TITER}
HAPTOGLOB SERPL-MCNC: 139 MG/DL
HAPTOGLOB SERPL-MCNC: 163 MG/DL
HAPTOGLOB SERPL-MCNC: 249 MG/DL
HAV IGM SERPL QL IA: NEGATIVE
HAV IGM SERPL QL IA: NEGATIVE
HBA1C MFR BLD HPLC: 5.1 %
HBA1C MFR BLD HPLC: 5.9 %
HBA1C MFR BLD HPLC: 6.9 %
HBV CORE AB SERPL QL IA: NEGATIVE
HBV CORE AB SERPL QL IA: NEGATIVE
HBV SURFACE AB SER-ACNC: POSITIVE M[IU]/ML
HBV SURFACE AB SER-ACNC: POSITIVE M[IU]/ML
HBV SURFACE AG SERPL QL IA: NEGATIVE
HBV SURFACE AG SERPL QL IA: NEGATIVE
HCO3 UR-SCNC: 13.6 MMOL/L (ref 24–28)
HCO3 UR-SCNC: 15.1 MMOL/L (ref 24–28)
HCO3 UR-SCNC: 15.9 MMOL/L (ref 24–28)
HCO3 UR-SCNC: 16.4 MMOL/L (ref 24–28)
HCO3 UR-SCNC: 16.4 MMOL/L (ref 24–28)
HCO3 UR-SCNC: 16.6 MMOL/L (ref 24–28)
HCO3 UR-SCNC: 16.6 MMOL/L (ref 24–28)
HCO3 UR-SCNC: 17 MMOL/L (ref 24–28)
HCO3 UR-SCNC: 17.1 MMOL/L (ref 24–28)
HCO3 UR-SCNC: 17.2 MMOL/L (ref 24–28)
HCO3 UR-SCNC: 17.2 MMOL/L (ref 24–28)
HCO3 UR-SCNC: 17.3 MMOL/L (ref 24–28)
HCO3 UR-SCNC: 17.4 MMOL/L (ref 24–28)
HCO3 UR-SCNC: 17.7 MMOL/L (ref 24–28)
HCO3 UR-SCNC: 17.7 MMOL/L (ref 24–28)
HCO3 UR-SCNC: 18.4 MMOL/L (ref 24–28)
HCO3 UR-SCNC: 18.5 MMOL/L (ref 24–28)
HCO3 UR-SCNC: 19.3 MMOL/L (ref 24–28)
HCO3 UR-SCNC: 20.1 MMOL/L (ref 24–28)
HCO3 UR-SCNC: 20.4 MMOL/L (ref 24–28)
HCO3 UR-SCNC: 20.7 MMOL/L (ref 24–28)
HCO3 UR-SCNC: 20.9 MMOL/L (ref 24–28)
HCO3 UR-SCNC: 21.3 MMOL/L (ref 24–28)
HCO3 UR-SCNC: 21.6 MMOL/L (ref 24–28)
HCO3 UR-SCNC: 21.6 MMOL/L (ref 24–28)
HCO3 UR-SCNC: 22.1 MMOL/L (ref 24–28)
HCO3 UR-SCNC: 22.8 MMOL/L (ref 24–28)
HCO3 UR-SCNC: 23.2 MMOL/L (ref 24–28)
HCO3 UR-SCNC: 23.5 MMOL/L (ref 24–28)
HCO3 UR-SCNC: 23.5 MMOL/L (ref 24–28)
HCO3 UR-SCNC: 23.7 MMOL/L (ref 24–28)
HCO3 UR-SCNC: 23.9 MMOL/L (ref 24–28)
HCO3 UR-SCNC: 24.3 MMOL/L (ref 24–28)
HCO3 UR-SCNC: 24.4 MMOL/L (ref 24–28)
HCO3 UR-SCNC: 24.8 MMOL/L (ref 24–28)
HCO3 UR-SCNC: 25 MMOL/L (ref 24–28)
HCO3 UR-SCNC: 25.1 MMOL/L (ref 24–28)
HCO3 UR-SCNC: 25.2 MMOL/L (ref 24–28)
HCO3 UR-SCNC: 25.7 MMOL/L (ref 24–28)
HCO3 UR-SCNC: 25.8 MMOL/L (ref 24–28)
HCO3 UR-SCNC: 26.2 MMOL/L (ref 24–28)
HCO3 UR-SCNC: 26.5 MMOL/L (ref 24–28)
HCO3 UR-SCNC: 27 MMOL/L (ref 24–28)
HCO3 UR-SCNC: 27.2 MMOL/L (ref 24–28)
HCO3 UR-SCNC: 27.2 MMOL/L (ref 24–28)
HCO3 UR-SCNC: 27.6 MMOL/L (ref 24–28)
HCO3 UR-SCNC: 27.6 MMOL/L (ref 24–28)
HCO3 UR-SCNC: 27.7 MMOL/L (ref 24–28)
HCO3 UR-SCNC: 28.4 MMOL/L (ref 24–28)
HCO3 UR-SCNC: 29.2 MMOL/L (ref 24–28)
HCO3 UR-SCNC: 29.2 MMOL/L (ref 24–28)
HCO3 UR-SCNC: 29.8 MMOL/L (ref 24–28)
HCO3 UR-SCNC: 29.9 MMOL/L (ref 24–28)
HCO3 UR-SCNC: 30.2 MMOL/L (ref 24–28)
HCO3 UR-SCNC: 30.4 MMOL/L (ref 24–28)
HCO3 UR-SCNC: 31.2 MMOL/L (ref 24–28)
HCO3 UR-SCNC: 38.9 MMOL/L (ref 24–28)
HCT VFR BLD AUTO: 18.3 %
HCT VFR BLD AUTO: 19.5 %
HCT VFR BLD AUTO: 19.6 %
HCT VFR BLD AUTO: 19.7 %
HCT VFR BLD AUTO: 20 %
HCT VFR BLD AUTO: 20.2 %
HCT VFR BLD AUTO: 20.4 %
HCT VFR BLD AUTO: 20.5 %
HCT VFR BLD AUTO: 20.8 %
HCT VFR BLD AUTO: 20.8 %
HCT VFR BLD AUTO: 21.3 %
HCT VFR BLD AUTO: 21.3 %
HCT VFR BLD AUTO: 21.4 %
HCT VFR BLD AUTO: 21.4 %
HCT VFR BLD AUTO: 21.6 %
HCT VFR BLD AUTO: 21.8 %
HCT VFR BLD AUTO: 21.8 %
HCT VFR BLD AUTO: 21.9 %
HCT VFR BLD AUTO: 21.9 %
HCT VFR BLD AUTO: 22.1 %
HCT VFR BLD AUTO: 22.1 %
HCT VFR BLD AUTO: 22.2 %
HCT VFR BLD AUTO: 22.3 %
HCT VFR BLD AUTO: 22.4 %
HCT VFR BLD AUTO: 22.4 %
HCT VFR BLD AUTO: 22.6 %
HCT VFR BLD AUTO: 22.7 %
HCT VFR BLD AUTO: 22.8 %
HCT VFR BLD AUTO: 22.9 %
HCT VFR BLD AUTO: 23 %
HCT VFR BLD AUTO: 23.4 %
HCT VFR BLD AUTO: 23.5 %
HCT VFR BLD AUTO: 23.6 %
HCT VFR BLD AUTO: 23.7 %
HCT VFR BLD AUTO: 23.7 %
HCT VFR BLD AUTO: 23.8 %
HCT VFR BLD AUTO: 23.9 %
HCT VFR BLD AUTO: 24.1 %
HCT VFR BLD AUTO: 24.2 %
HCT VFR BLD AUTO: 24.3 %
HCT VFR BLD AUTO: 24.4 %
HCT VFR BLD AUTO: 24.4 %
HCT VFR BLD AUTO: 24.6 %
HCT VFR BLD AUTO: 24.7 %
HCT VFR BLD AUTO: 24.8 %
HCT VFR BLD AUTO: 24.9 %
HCT VFR BLD AUTO: 25.1 %
HCT VFR BLD AUTO: 25.2 %
HCT VFR BLD AUTO: 25.3 %
HCT VFR BLD AUTO: 25.4 %
HCT VFR BLD AUTO: 25.5 %
HCT VFR BLD AUTO: 25.5 %
HCT VFR BLD AUTO: 25.6 %
HCT VFR BLD AUTO: 25.7 %
HCT VFR BLD AUTO: 25.8 %
HCT VFR BLD AUTO: 25.9 %
HCT VFR BLD AUTO: 25.9 %
HCT VFR BLD AUTO: 26 %
HCT VFR BLD AUTO: 26 %
HCT VFR BLD AUTO: 26.2 %
HCT VFR BLD AUTO: 26.3 %
HCT VFR BLD AUTO: 26.5 %
HCT VFR BLD AUTO: 26.6 %
HCT VFR BLD AUTO: 26.7 %
HCT VFR BLD AUTO: 26.8 %
HCT VFR BLD AUTO: 26.9 %
HCT VFR BLD AUTO: 27 %
HCT VFR BLD AUTO: 27.1 %
HCT VFR BLD AUTO: 27.2 %
HCT VFR BLD AUTO: 27.3 %
HCT VFR BLD AUTO: 27.4 %
HCT VFR BLD AUTO: 27.6 %
HCT VFR BLD AUTO: 27.6 %
HCT VFR BLD AUTO: 27.7 %
HCT VFR BLD AUTO: 27.7 %
HCT VFR BLD AUTO: 27.8 %
HCT VFR BLD AUTO: 27.8 %
HCT VFR BLD AUTO: 27.9 %
HCT VFR BLD AUTO: 28.1 %
HCT VFR BLD AUTO: 28.1 %
HCT VFR BLD AUTO: 28.2 %
HCT VFR BLD AUTO: 28.3 %
HCT VFR BLD AUTO: 28.3 %
HCT VFR BLD AUTO: 28.4 %
HCT VFR BLD AUTO: 28.5 %
HCT VFR BLD AUTO: 28.6 %
HCT VFR BLD AUTO: 28.7 %
HCT VFR BLD AUTO: 28.7 %
HCT VFR BLD AUTO: 28.9 %
HCT VFR BLD AUTO: 29.1 %
HCT VFR BLD AUTO: 29.3 %
HCT VFR BLD AUTO: 29.3 %
HCT VFR BLD AUTO: 29.4 %
HCT VFR BLD AUTO: 29.5 %
HCT VFR BLD AUTO: 29.5 %
HCT VFR BLD AUTO: 29.6 %
HCT VFR BLD AUTO: 29.9 %
HCT VFR BLD AUTO: 30.2 %
HCT VFR BLD AUTO: 30.3 %
HCT VFR BLD AUTO: 30.6 %
HCT VFR BLD AUTO: 30.6 %
HDLC SERPL-MCNC: <5 MG/DL
HDLC SERPL: ABNORMAL %
HEPARIN INDUCED THROMBOCYTOPENIA: ABNORMAL
HGB BLD-MCNC: 5.6 G/DL
HGB BLD-MCNC: 6.1 G/DL
HGB BLD-MCNC: 6.2 G/DL
HGB BLD-MCNC: 6.3 G/DL
HGB BLD-MCNC: 6.3 G/DL
HGB BLD-MCNC: 6.5 G/DL
HGB BLD-MCNC: 6.6 G/DL
HGB BLD-MCNC: 6.7 G/DL
HGB BLD-MCNC: 6.8 G/DL
HGB BLD-MCNC: 6.9 G/DL
HGB BLD-MCNC: 7 G/DL
HGB BLD-MCNC: 7.1 G/DL
HGB BLD-MCNC: 7.2 G/DL
HGB BLD-MCNC: 7.3 G/DL
HGB BLD-MCNC: 7.4 G/DL
HGB BLD-MCNC: 7.5 G/DL
HGB BLD-MCNC: 7.6 G/DL
HGB BLD-MCNC: 7.7 G/DL
HGB BLD-MCNC: 7.8 G/DL
HGB BLD-MCNC: 7.9 G/DL
HGB BLD-MCNC: 8 G/DL
HGB BLD-MCNC: 8.1 G/DL
HGB BLD-MCNC: 8.2 G/DL
HGB BLD-MCNC: 8.3 G/DL
HGB BLD-MCNC: 8.4 G/DL
HGB BLD-MCNC: 8.5 G/DL
HGB BLD-MCNC: 8.6 G/DL
HGB BLD-MCNC: 8.7 G/DL
HGB BLD-MCNC: 8.8 G/DL
HGB BLD-MCNC: 8.9 G/DL
HGB BLD-MCNC: 9 G/DL
HGB BLD-MCNC: 9.1 G/DL
HGB BLD-MCNC: 9.2 G/DL
HGB BLD-MCNC: 9.3 G/DL
HGB BLD-MCNC: 9.7 G/DL
HGB UR QL STRIP: NEGATIVE
HGB UR QL STRIP: NEGATIVE
HISTOPLASMA AG INTERP.: NEGATIVE
HISTOPLASMA AG VALUE: 0 NG/ML
HISTOPLASMA ANTIGEN URINE: NEGATIVE
HISTOPLASMA RESULT: NORMAL NG/ML
HUMAN BOCAVIRUS: NOT DETECTED
HUMAN CORONAVIRUS, COMMON COLD VIRUS: NOT DETECTED
HYALINE CASTS UR QL AUTO: 0 /LPF
HYPOCHROMIA BLD QL SMEAR: ABNORMAL
IMM GRANULOCYTES # BLD AUTO: 0.02 K/UL
IMM GRANULOCYTES # BLD AUTO: 0.03 K/UL
IMM GRANULOCYTES # BLD AUTO: 0.04 K/UL
IMM GRANULOCYTES # BLD AUTO: 0.05 K/UL
IMM GRANULOCYTES # BLD AUTO: 0.06 K/UL
IMM GRANULOCYTES # BLD AUTO: 0.07 K/UL
IMM GRANULOCYTES # BLD AUTO: 0.08 K/UL
IMM GRANULOCYTES # BLD AUTO: 0.1 K/UL
IMM GRANULOCYTES # BLD AUTO: 0.1 K/UL
IMM GRANULOCYTES # BLD AUTO: 0.11 K/UL
IMM GRANULOCYTES # BLD AUTO: 0.12 K/UL
IMM GRANULOCYTES # BLD AUTO: 0.13 K/UL
IMM GRANULOCYTES # BLD AUTO: 0.13 K/UL
IMM GRANULOCYTES # BLD AUTO: 0.14 K/UL
IMM GRANULOCYTES # BLD AUTO: 0.15 K/UL
IMM GRANULOCYTES # BLD AUTO: 0.16 K/UL
IMM GRANULOCYTES # BLD AUTO: 0.17 K/UL
IMM GRANULOCYTES # BLD AUTO: 0.17 K/UL
IMM GRANULOCYTES # BLD AUTO: 0.18 K/UL
IMM GRANULOCYTES # BLD AUTO: 0.2 K/UL
IMM GRANULOCYTES # BLD AUTO: 0.21 K/UL
IMM GRANULOCYTES # BLD AUTO: 0.21 K/UL
IMM GRANULOCYTES # BLD AUTO: 0.24 K/UL
IMM GRANULOCYTES # BLD AUTO: 0.25 K/UL
IMM GRANULOCYTES # BLD AUTO: 0.28 K/UL
IMM GRANULOCYTES # BLD AUTO: 0.31 K/UL
IMM GRANULOCYTES # BLD AUTO: 0.36 K/UL
IMM GRANULOCYTES # BLD AUTO: 0.4 K/UL
IMM GRANULOCYTES # BLD AUTO: 0.45 K/UL
IMM GRANULOCYTES # BLD AUTO: 0.46 K/UL
IMM GRANULOCYTES # BLD AUTO: ABNORMAL K/UL
IMM GRANULOCYTES NFR BLD AUTO: 0.6 %
IMM GRANULOCYTES NFR BLD AUTO: 0.7 %
IMM GRANULOCYTES NFR BLD AUTO: 0.8 %
IMM GRANULOCYTES NFR BLD AUTO: 0.9 %
IMM GRANULOCYTES NFR BLD AUTO: 1 %
IMM GRANULOCYTES NFR BLD AUTO: 1.1 %
IMM GRANULOCYTES NFR BLD AUTO: 1.2 %
IMM GRANULOCYTES NFR BLD AUTO: 1.3 %
IMM GRANULOCYTES NFR BLD AUTO: 1.4 %
IMM GRANULOCYTES NFR BLD AUTO: 1.5 %
IMM GRANULOCYTES NFR BLD AUTO: 1.6 %
IMM GRANULOCYTES NFR BLD AUTO: 1.7 %
IMM GRANULOCYTES NFR BLD AUTO: 1.8 %
IMM GRANULOCYTES NFR BLD AUTO: 1.9 %
IMM GRANULOCYTES NFR BLD AUTO: 2 %
IMM GRANULOCYTES NFR BLD AUTO: 2.2 %
IMM GRANULOCYTES NFR BLD AUTO: 2.2 %
IMM GRANULOCYTES NFR BLD AUTO: 2.3 %
IMM GRANULOCYTES NFR BLD AUTO: 2.4 %
IMM GRANULOCYTES NFR BLD AUTO: 2.4 %
IMM GRANULOCYTES NFR BLD AUTO: 2.5 %
IMM GRANULOCYTES NFR BLD AUTO: 2.5 %
IMM GRANULOCYTES NFR BLD AUTO: 2.6 %
IMM GRANULOCYTES NFR BLD AUTO: 2.7 %
IMM GRANULOCYTES NFR BLD AUTO: 2.9 %
IMM GRANULOCYTES NFR BLD AUTO: 3 %
IMM GRANULOCYTES NFR BLD AUTO: 3 %
IMM GRANULOCYTES NFR BLD AUTO: 3.1 %
IMM GRANULOCYTES NFR BLD AUTO: 3.1 %
IMM GRANULOCYTES NFR BLD AUTO: 3.2 %
IMM GRANULOCYTES NFR BLD AUTO: 3.3 %
IMM GRANULOCYTES NFR BLD AUTO: 3.4 %
IMM GRANULOCYTES NFR BLD AUTO: 3.4 %
IMM GRANULOCYTES NFR BLD AUTO: 3.8 %
IMM GRANULOCYTES NFR BLD AUTO: 3.8 %
IMM GRANULOCYTES NFR BLD AUTO: 3.9 %
IMM GRANULOCYTES NFR BLD AUTO: 4.3 %
IMM GRANULOCYTES NFR BLD AUTO: 4.4 %
IMM GRANULOCYTES NFR BLD AUTO: 4.8 %
IMM GRANULOCYTES NFR BLD AUTO: 5 %
IMM GRANULOCYTES NFR BLD AUTO: ABNORMAL %
IMMUNKNOW (STIMULATED): 72 NG/ML
INFLUENZA A - H1N1-09: NOT DETECTED
INR PPP: 1
INR PPP: 1.1
INR PPP: 1.2
INR PPP: 1.4
INR PPP: 1.5
INR PPP: 1.5
INR PPP: 1.6
INR PPP: 1.7
INR PPP: 1.8
INR PPP: 2.1
IP: 12
IP: 12
IRON SERPL-MCNC: 79 UG/DL
KETONES UR QL STRIP: NEGATIVE
KETONES UR QL STRIP: NEGATIVE
KOH PREP SPEC: NORMAL
L PNEUMO AG UR QL IA: NOT DETECTED
L PNEUMO AG UR QL IA: NOT DETECTED
LACTATE PLASV-SCNC: NOT DETECTED MMOL/L
LACTATE SERPL-SCNC: 0.8 MMOL/L
LACTATE SERPL-SCNC: 0.8 MMOL/L
LACTATE SERPL-SCNC: 0.9 MMOL/L
LACTATE SERPL-SCNC: 1.2 MMOL/L
LACTATE SERPL-SCNC: 1.3 MMOL/L
LACTATE SERPL-SCNC: 1.3 MMOL/L
LACTATE SERPL-SCNC: 1.4 MMOL/L
LACTATE SERPL-SCNC: 1.6 MMOL/L
LACTATE SERPL-SCNC: 1.7 MMOL/L
LACTATE SERPL-SCNC: 2.3 MMOL/L
LACTATE SERPL-SCNC: 2.7 MMOL/L
LDH SERPL L TO P-CCNC: 0.84 MMOL/L (ref 0.5–2.2)
LDH SERPL L TO P-CCNC: 1.66 MMOL/L (ref 0.36–1.25)
LDH SERPL L TO P-CCNC: 177 U/L
LDH SERPL L TO P-CCNC: 2.08 MMOL/L (ref 0.5–2.2)
LDH SERPL L TO P-CCNC: 228 U/L
LDH SERPL L TO P-CCNC: 367 U/L
LDH SERPL L TO P-CCNC: 597 U/L
LDLC SERPL CALC-MCNC: ABNORMAL MG/DL
LEGIONELLA CULTURE SOURCEBY IFA: NORMAL
LEGIONELLA SPEC CULT: NORMAL
LEGIONELLA SPEC CULT: NORMAL
LEUKOCYTE ESTERASE UR QL STRIP: NEGATIVE
LEUKOCYTE ESTERASE UR QL STRIP: NEGATIVE
LIPASE SERPL-CCNC: 7 U/L
LIPASE SERPL-CCNC: <3 U/L
LYMPHOCYTES # BLD AUTO: 0.2 K/UL
LYMPHOCYTES # BLD AUTO: 0.3 K/UL
LYMPHOCYTES # BLD AUTO: 0.4 K/UL
LYMPHOCYTES # BLD AUTO: 0.5 K/UL
LYMPHOCYTES # BLD AUTO: 0.6 K/UL
LYMPHOCYTES # BLD AUTO: 0.7 K/UL
LYMPHOCYTES # BLD AUTO: 0.8 K/UL
LYMPHOCYTES # BLD AUTO: 0.9 K/UL
LYMPHOCYTES # BLD AUTO: 1 K/UL
LYMPHOCYTES # BLD AUTO: 1.1 K/UL
LYMPHOCYTES # BLD AUTO: 1.3 K/UL
LYMPHOCYTES # BLD AUTO: 1.3 K/UL
LYMPHOCYTES # BLD AUTO: 1.4 K/UL
LYMPHOCYTES # BLD AUTO: 1.7 K/UL
LYMPHOCYTES # BLD AUTO: ABNORMAL K/UL
LYMPHOCYTES NFR BLD: 0 %
LYMPHOCYTES NFR BLD: 1 %
LYMPHOCYTES NFR BLD: 1.2 %
LYMPHOCYTES NFR BLD: 1.5 %
LYMPHOCYTES NFR BLD: 10 %
LYMPHOCYTES NFR BLD: 10.4 %
LYMPHOCYTES NFR BLD: 10.9 %
LYMPHOCYTES NFR BLD: 10.9 %
LYMPHOCYTES NFR BLD: 11 %
LYMPHOCYTES NFR BLD: 11.2 %
LYMPHOCYTES NFR BLD: 11.3 %
LYMPHOCYTES NFR BLD: 12 %
LYMPHOCYTES NFR BLD: 12.1 %
LYMPHOCYTES NFR BLD: 12.5 %
LYMPHOCYTES NFR BLD: 12.6 %
LYMPHOCYTES NFR BLD: 12.6 %
LYMPHOCYTES NFR BLD: 12.7 %
LYMPHOCYTES NFR BLD: 12.7 %
LYMPHOCYTES NFR BLD: 13 %
LYMPHOCYTES NFR BLD: 13 %
LYMPHOCYTES NFR BLD: 13.2 %
LYMPHOCYTES NFR BLD: 13.6 %
LYMPHOCYTES NFR BLD: 13.6 %
LYMPHOCYTES NFR BLD: 13.9 %
LYMPHOCYTES NFR BLD: 14 %
LYMPHOCYTES NFR BLD: 14.2 %
LYMPHOCYTES NFR BLD: 14.4 %
LYMPHOCYTES NFR BLD: 15 %
LYMPHOCYTES NFR BLD: 15.1 %
LYMPHOCYTES NFR BLD: 15.3 %
LYMPHOCYTES NFR BLD: 15.5 %
LYMPHOCYTES NFR BLD: 16 %
LYMPHOCYTES NFR BLD: 16 %
LYMPHOCYTES NFR BLD: 16.3 %
LYMPHOCYTES NFR BLD: 16.4 %
LYMPHOCYTES NFR BLD: 16.4 %
LYMPHOCYTES NFR BLD: 16.5 %
LYMPHOCYTES NFR BLD: 17 %
LYMPHOCYTES NFR BLD: 17.2 %
LYMPHOCYTES NFR BLD: 17.3 %
LYMPHOCYTES NFR BLD: 17.5 %
LYMPHOCYTES NFR BLD: 17.9 %
LYMPHOCYTES NFR BLD: 18 %
LYMPHOCYTES NFR BLD: 18.1 %
LYMPHOCYTES NFR BLD: 18.2 %
LYMPHOCYTES NFR BLD: 18.8 %
LYMPHOCYTES NFR BLD: 19 %
LYMPHOCYTES NFR BLD: 19 %
LYMPHOCYTES NFR BLD: 19.7 %
LYMPHOCYTES NFR BLD: 19.8 %
LYMPHOCYTES NFR BLD: 2 %
LYMPHOCYTES NFR BLD: 2.1 %
LYMPHOCYTES NFR BLD: 2.6 %
LYMPHOCYTES NFR BLD: 2.9 %
LYMPHOCYTES NFR BLD: 20 %
LYMPHOCYTES NFR BLD: 20.5 %
LYMPHOCYTES NFR BLD: 21 %
LYMPHOCYTES NFR BLD: 21.6 %
LYMPHOCYTES NFR BLD: 22 %
LYMPHOCYTES NFR BLD: 22.7 %
LYMPHOCYTES NFR BLD: 23.2 %
LYMPHOCYTES NFR BLD: 24.4 %
LYMPHOCYTES NFR BLD: 25.4 %
LYMPHOCYTES NFR BLD: 26.9 %
LYMPHOCYTES NFR BLD: 29.6 %
LYMPHOCYTES NFR BLD: 3 %
LYMPHOCYTES NFR BLD: 3.1 %
LYMPHOCYTES NFR BLD: 3.3 %
LYMPHOCYTES NFR BLD: 32 %
LYMPHOCYTES NFR BLD: 34.4 %
LYMPHOCYTES NFR BLD: 4 %
LYMPHOCYTES NFR BLD: 4.3 %
LYMPHOCYTES NFR BLD: 5 %
LYMPHOCYTES NFR BLD: 5.8 %
LYMPHOCYTES NFR BLD: 6 %
LYMPHOCYTES NFR BLD: 6 %
LYMPHOCYTES NFR BLD: 7 %
LYMPHOCYTES NFR BLD: 8 %
LYMPHOCYTES NFR BLD: 8.8 %
LYMPHOCYTES NFR BLD: 9 %
LYMPHOCYTES NFR BLD: 9.2 %
LYMPHOCYTES NFR BLD: 9.5 %
LYMPHOCYTES NFR BLD: 9.5 %
LYMPHOCYTES NFR BLD: 9.8 %
LYMPHOCYTES NFR BLD: 9.9 %
LYMPHOCYTES NFR FLD MANUAL: 26 %
LYMPHOCYTES NFR FLD MANUAL: 60 %
MAGNESIUM SERPL-MCNC: 1.3 MG/DL
MAGNESIUM SERPL-MCNC: 1.3 MG/DL
MAGNESIUM SERPL-MCNC: 1.4 MG/DL
MAGNESIUM SERPL-MCNC: 1.5 MG/DL
MAGNESIUM SERPL-MCNC: 1.6 MG/DL
MAGNESIUM SERPL-MCNC: 1.7 MG/DL
MAGNESIUM SERPL-MCNC: 1.8 MG/DL
MAGNESIUM SERPL-MCNC: 1.9 MG/DL
MAGNESIUM SERPL-MCNC: 2 MG/DL
MAGNESIUM SERPL-MCNC: 2.1 MG/DL
MAGNESIUM SERPL-MCNC: 2.2 MG/DL
MAGNESIUM SERPL-MCNC: 2.3 MG/DL
MAGNESIUM SERPL-MCNC: 2.4 MG/DL
MAGNESIUM SERPL-MCNC: 2.5 MG/DL
MAGNESIUM SERPL-MCNC: 2.6 MG/DL
MAGNESIUM SERPL-MCNC: 2.6 MG/DL
MAGNESIUM SERPL-MCNC: 3.5 MG/DL
MAP: 7.6
MCH RBC QN AUTO: 28.1 PG
MCH RBC QN AUTO: 28.3 PG
MCH RBC QN AUTO: 28.7 PG
MCH RBC QN AUTO: 28.8 PG
MCH RBC QN AUTO: 28.9 PG
MCH RBC QN AUTO: 28.9 PG
MCH RBC QN AUTO: 29 PG
MCH RBC QN AUTO: 29.1 PG
MCH RBC QN AUTO: 29.1 PG
MCH RBC QN AUTO: 29.2 PG
MCH RBC QN AUTO: 29.3 PG
MCH RBC QN AUTO: 29.4 PG
MCH RBC QN AUTO: 29.5 PG
MCH RBC QN AUTO: 29.6 PG
MCH RBC QN AUTO: 29.7 PG
MCH RBC QN AUTO: 29.8 PG
MCH RBC QN AUTO: 29.9 PG
MCH RBC QN AUTO: 30 PG
MCH RBC QN AUTO: 30.1 PG
MCH RBC QN AUTO: 30.2 PG
MCH RBC QN AUTO: 30.3 PG
MCH RBC QN AUTO: 30.4 PG
MCH RBC QN AUTO: 30.4 PG
MCH RBC QN AUTO: 30.5 PG
MCH RBC QN AUTO: 30.5 PG
MCH RBC QN AUTO: 30.7 PG
MCH RBC QN AUTO: 30.8 PG
MCH RBC QN AUTO: 30.9 PG
MCH RBC QN AUTO: 31 PG
MCH RBC QN AUTO: 31.1 PG
MCH RBC QN AUTO: 31.2 PG
MCH RBC QN AUTO: 31.3 PG
MCH RBC QN AUTO: 31.3 PG
MCH RBC QN AUTO: 31.4 PG
MCH RBC QN AUTO: 31.5 PG
MCH RBC QN AUTO: 31.6 PG
MCH RBC QN AUTO: 31.8 PG
MCH RBC QN AUTO: 31.9 PG
MCH RBC QN AUTO: 32.1 PG
MCHC RBC AUTO-ENTMCNC: 28.7 G/DL
MCHC RBC AUTO-ENTMCNC: 29.3 G/DL
MCHC RBC AUTO-ENTMCNC: 29.4 G/DL
MCHC RBC AUTO-ENTMCNC: 29.5 G/DL
MCHC RBC AUTO-ENTMCNC: 29.5 G/DL
MCHC RBC AUTO-ENTMCNC: 29.7 G/DL
MCHC RBC AUTO-ENTMCNC: 29.7 G/DL
MCHC RBC AUTO-ENTMCNC: 29.9 G/DL
MCHC RBC AUTO-ENTMCNC: 30 G/DL
MCHC RBC AUTO-ENTMCNC: 30.1 G/DL
MCHC RBC AUTO-ENTMCNC: 30.2 G/DL
MCHC RBC AUTO-ENTMCNC: 30.2 G/DL
MCHC RBC AUTO-ENTMCNC: 30.3 G/DL
MCHC RBC AUTO-ENTMCNC: 30.4 G/DL
MCHC RBC AUTO-ENTMCNC: 30.5 G/DL
MCHC RBC AUTO-ENTMCNC: 30.6 G/DL
MCHC RBC AUTO-ENTMCNC: 30.7 G/DL
MCHC RBC AUTO-ENTMCNC: 30.8 G/DL
MCHC RBC AUTO-ENTMCNC: 30.9 G/DL
MCHC RBC AUTO-ENTMCNC: 31 G/DL
MCHC RBC AUTO-ENTMCNC: 31.1 G/DL
MCHC RBC AUTO-ENTMCNC: 31.2 G/DL
MCHC RBC AUTO-ENTMCNC: 31.3 G/DL
MCHC RBC AUTO-ENTMCNC: 31.4 G/DL
MCHC RBC AUTO-ENTMCNC: 31.5 G/DL
MCHC RBC AUTO-ENTMCNC: 31.6 G/DL
MCHC RBC AUTO-ENTMCNC: 31.7 G/DL
MCHC RBC AUTO-ENTMCNC: 31.7 G/DL
MCHC RBC AUTO-ENTMCNC: 31.8 G/DL
MCHC RBC AUTO-ENTMCNC: 31.9 G/DL
MCHC RBC AUTO-ENTMCNC: 32 G/DL
MCHC RBC AUTO-ENTMCNC: 32.1 G/DL
MCHC RBC AUTO-ENTMCNC: 32.2 G/DL
MCHC RBC AUTO-ENTMCNC: 32.3 G/DL
MCHC RBC AUTO-ENTMCNC: 32.3 G/DL
MCHC RBC AUTO-ENTMCNC: 32.4 G/DL
MCHC RBC AUTO-ENTMCNC: 32.5 G/DL
MCHC RBC AUTO-ENTMCNC: 32.6 G/DL
MCHC RBC AUTO-ENTMCNC: 32.7 G/DL
MCHC RBC AUTO-ENTMCNC: 32.9 G/DL
MCHC RBC AUTO-ENTMCNC: 33 G/DL
MCHC RBC AUTO-ENTMCNC: 33.1 G/DL
MCHC RBC AUTO-ENTMCNC: 33.2 G/DL
MCHC RBC AUTO-ENTMCNC: 33.5 G/DL
MCHC RBC AUTO-ENTMCNC: 33.6 G/DL
MCHC RBC AUTO-ENTMCNC: 34.1 G/DL
MCV RBC AUTO: 100 FL
MCV RBC AUTO: 101 FL
MCV RBC AUTO: 102 FL
MCV RBC AUTO: 103 FL
MCV RBC AUTO: 104 FL
MCV RBC AUTO: 104 FL
MCV RBC AUTO: 105 FL
MCV RBC AUTO: 87 FL
MCV RBC AUTO: 87 FL
MCV RBC AUTO: 88 FL
MCV RBC AUTO: 89 FL
MCV RBC AUTO: 90 FL
MCV RBC AUTO: 91 FL
MCV RBC AUTO: 92 FL
MCV RBC AUTO: 93 FL
MCV RBC AUTO: 94 FL
MCV RBC AUTO: 95 FL
MCV RBC AUTO: 96 FL
MCV RBC AUTO: 97 FL
MCV RBC AUTO: 98 FL
MCV RBC AUTO: 99 FL
METAMYELOCYTES NFR BLD MANUAL: 1 %
METAMYELOCYTES NFR BLD MANUAL: 2 %
METAMYELOCYTES NFR BLD MANUAL: 3 %
MICROSCOPIC COMMENT: ABNORMAL
MIN VOL: 11
MIN VOL: 11.1
MIN VOL: 11.2
MIN VOL: 11.2
MIN VOL: 11.4
MIN VOL: 11.4
MIN VOL: 11.9
MIN VOL: 14.3
MIN VOL: 14.8
MIN VOL: 7.7
MIN VOL: 8.5
MIN VOL: 8.89
MIN VOL: 9.41
MITOGEN NIL: 0.03 IU/ML
MITRAL VALVE MOBILITY: NORMAL
MITRAL VALVE REGURGITATION: NORMAL
MODE: ABNORMAL
MODE: NORMAL
MONOCYTES # BLD AUTO: 0.2 K/UL
MONOCYTES # BLD AUTO: 0.3 K/UL
MONOCYTES # BLD AUTO: 0.4 K/UL
MONOCYTES # BLD AUTO: 0.5 K/UL
MONOCYTES # BLD AUTO: 0.6 K/UL
MONOCYTES # BLD AUTO: 0.7 K/UL
MONOCYTES # BLD AUTO: 0.8 K/UL
MONOCYTES # BLD AUTO: 0.9 K/UL
MONOCYTES # BLD AUTO: 1 K/UL
MONOCYTES # BLD AUTO: 1.1 K/UL
MONOCYTES # BLD AUTO: 1.2 K/UL
MONOCYTES # BLD AUTO: ABNORMAL K/UL
MONOCYTES NFR BLD: 0 %
MONOCYTES NFR BLD: 1 %
MONOCYTES NFR BLD: 10 %
MONOCYTES NFR BLD: 10.2 %
MONOCYTES NFR BLD: 10.7 %
MONOCYTES NFR BLD: 10.9 %
MONOCYTES NFR BLD: 11 %
MONOCYTES NFR BLD: 11 %
MONOCYTES NFR BLD: 11.1 %
MONOCYTES NFR BLD: 11.1 %
MONOCYTES NFR BLD: 11.2 %
MONOCYTES NFR BLD: 11.4 %
MONOCYTES NFR BLD: 11.5 %
MONOCYTES NFR BLD: 11.5 %
MONOCYTES NFR BLD: 11.7 %
MONOCYTES NFR BLD: 11.7 %
MONOCYTES NFR BLD: 11.9 %
MONOCYTES NFR BLD: 12 %
MONOCYTES NFR BLD: 12.1 %
MONOCYTES NFR BLD: 12.2 %
MONOCYTES NFR BLD: 12.2 %
MONOCYTES NFR BLD: 12.3 %
MONOCYTES NFR BLD: 12.3 %
MONOCYTES NFR BLD: 12.4 %
MONOCYTES NFR BLD: 12.4 %
MONOCYTES NFR BLD: 12.5 %
MONOCYTES NFR BLD: 13 %
MONOCYTES NFR BLD: 13.1 %
MONOCYTES NFR BLD: 13.2 %
MONOCYTES NFR BLD: 13.3 %
MONOCYTES NFR BLD: 13.4 %
MONOCYTES NFR BLD: 13.5 %
MONOCYTES NFR BLD: 13.6 %
MONOCYTES NFR BLD: 13.8 %
MONOCYTES NFR BLD: 13.9 %
MONOCYTES NFR BLD: 14 %
MONOCYTES NFR BLD: 14.4 %
MONOCYTES NFR BLD: 14.7 %
MONOCYTES NFR BLD: 14.8 %
MONOCYTES NFR BLD: 14.9 %
MONOCYTES NFR BLD: 15 %
MONOCYTES NFR BLD: 15.2 %
MONOCYTES NFR BLD: 15.3 %
MONOCYTES NFR BLD: 16 %
MONOCYTES NFR BLD: 16.1 %
MONOCYTES NFR BLD: 16.1 %
MONOCYTES NFR BLD: 16.2 %
MONOCYTES NFR BLD: 16.2 %
MONOCYTES NFR BLD: 16.4 %
MONOCYTES NFR BLD: 16.4 %
MONOCYTES NFR BLD: 16.5 %
MONOCYTES NFR BLD: 16.7 %
MONOCYTES NFR BLD: 16.7 %
MONOCYTES NFR BLD: 16.8 %
MONOCYTES NFR BLD: 17.2 %
MONOCYTES NFR BLD: 18.6 %
MONOCYTES NFR BLD: 18.7 %
MONOCYTES NFR BLD: 2 %
MONOCYTES NFR BLD: 2.2 %
MONOCYTES NFR BLD: 20.9 %
MONOCYTES NFR BLD: 22 %
MONOCYTES NFR BLD: 23.2 %
MONOCYTES NFR BLD: 24.5 %
MONOCYTES NFR BLD: 29 %
MONOCYTES NFR BLD: 3 %
MONOCYTES NFR BLD: 4 %
MONOCYTES NFR BLD: 4 %
MONOCYTES NFR BLD: 4.6 %
MONOCYTES NFR BLD: 4.8 %
MONOCYTES NFR BLD: 5 %
MONOCYTES NFR BLD: 5.6 %
MONOCYTES NFR BLD: 5.6 %
MONOCYTES NFR BLD: 5.7 %
MONOCYTES NFR BLD: 6 %
MONOCYTES NFR BLD: 6.3 %
MONOCYTES NFR BLD: 7 %
MONOCYTES NFR BLD: 7.4 %
MONOCYTES NFR BLD: 7.5 %
MONOCYTES NFR BLD: 7.6 %
MONOCYTES NFR BLD: 7.6 %
MONOCYTES NFR BLD: 7.8 %
MONOCYTES NFR BLD: 8 %
MONOCYTES NFR BLD: 8.5 %
MONOCYTES NFR BLD: 9 %
MONOCYTES NFR BLD: 9.1 %
MONOCYTES NFR BLD: 9.2 %
MONOS+MACROS NFR FLD MANUAL: 10 %
MONOS+MACROS NFR FLD MANUAL: 22 %
MONOS+MACROS NFR FLD MANUAL: 4 %
MYCOBACTERIUM SPEC QL CULT: NORMAL
MYELOCYTES NFR BLD MANUAL: 1 %
MYELOCYTES NFR BLD MANUAL: 1 %
MYELOCYTES NFR BLD MANUAL: 2 %
MYELOCYTES NFR BLD MANUAL: 3 %
MYELOCYTES NFR BLD MANUAL: 4 %
MYELOCYTES NFR BLD MANUAL: 6 %
NEUTROPHILS # BLD AUTO: 1.7 K/UL
NEUTROPHILS # BLD AUTO: 1.8 K/UL
NEUTROPHILS # BLD AUTO: 1.9 K/UL
NEUTROPHILS # BLD AUTO: 1.9 K/UL
NEUTROPHILS # BLD AUTO: 14.8 K/UL
NEUTROPHILS # BLD AUTO: 2 K/UL
NEUTROPHILS # BLD AUTO: 2 K/UL
NEUTROPHILS # BLD AUTO: 2.1 K/UL
NEUTROPHILS # BLD AUTO: 2.2 K/UL
NEUTROPHILS # BLD AUTO: 2.3 K/UL
NEUTROPHILS # BLD AUTO: 2.3 K/UL
NEUTROPHILS # BLD AUTO: 2.4 K/UL
NEUTROPHILS # BLD AUTO: 2.5 K/UL
NEUTROPHILS # BLD AUTO: 2.7 K/UL
NEUTROPHILS # BLD AUTO: 2.7 K/UL
NEUTROPHILS # BLD AUTO: 2.8 K/UL
NEUTROPHILS # BLD AUTO: 2.8 K/UL
NEUTROPHILS # BLD AUTO: 2.9 K/UL
NEUTROPHILS # BLD AUTO: 2.9 K/UL
NEUTROPHILS # BLD AUTO: 3 K/UL
NEUTROPHILS # BLD AUTO: 3 K/UL
NEUTROPHILS # BLD AUTO: 3.1 K/UL
NEUTROPHILS # BLD AUTO: 3.2 K/UL
NEUTROPHILS # BLD AUTO: 3.2 K/UL
NEUTROPHILS # BLD AUTO: 3.3 K/UL
NEUTROPHILS # BLD AUTO: 3.3 K/UL
NEUTROPHILS # BLD AUTO: 3.4 K/UL
NEUTROPHILS # BLD AUTO: 3.5 K/UL
NEUTROPHILS # BLD AUTO: 3.5 K/UL
NEUTROPHILS # BLD AUTO: 3.6 K/UL
NEUTROPHILS # BLD AUTO: 3.7 K/UL
NEUTROPHILS # BLD AUTO: 3.8 K/UL
NEUTROPHILS # BLD AUTO: 3.9 K/UL
NEUTROPHILS # BLD AUTO: 4 K/UL
NEUTROPHILS # BLD AUTO: 4 K/UL
NEUTROPHILS # BLD AUTO: 4.1 K/UL
NEUTROPHILS # BLD AUTO: 4.3 K/UL
NEUTROPHILS # BLD AUTO: 4.3 K/UL
NEUTROPHILS # BLD AUTO: 4.4 K/UL
NEUTROPHILS # BLD AUTO: 4.9 K/UL
NEUTROPHILS # BLD AUTO: 5.7 K/UL
NEUTROPHILS # BLD AUTO: 5.9 K/UL
NEUTROPHILS # BLD AUTO: 5.9 K/UL
NEUTROPHILS # BLD AUTO: 6.4 K/UL
NEUTROPHILS # BLD AUTO: 6.5 K/UL
NEUTROPHILS # BLD AUTO: 6.6 K/UL
NEUTROPHILS # BLD AUTO: 6.9 K/UL
NEUTROPHILS # BLD AUTO: 7 K/UL
NEUTROPHILS # BLD AUTO: 8.6 K/UL
NEUTROPHILS NFR BLD: 46 %
NEUTROPHILS NFR BLD: 49.4 %
NEUTROPHILS NFR BLD: 50.5 %
NEUTROPHILS NFR BLD: 51.5 %
NEUTROPHILS NFR BLD: 55.6 %
NEUTROPHILS NFR BLD: 56 %
NEUTROPHILS NFR BLD: 57.5 %
NEUTROPHILS NFR BLD: 60.2 %
NEUTROPHILS NFR BLD: 60.3 %
NEUTROPHILS NFR BLD: 60.4 %
NEUTROPHILS NFR BLD: 61.3 %
NEUTROPHILS NFR BLD: 61.4 %
NEUTROPHILS NFR BLD: 61.7 %
NEUTROPHILS NFR BLD: 61.7 %
NEUTROPHILS NFR BLD: 62.1 %
NEUTROPHILS NFR BLD: 62.2 %
NEUTROPHILS NFR BLD: 63 %
NEUTROPHILS NFR BLD: 64 %
NEUTROPHILS NFR BLD: 64.3 %
NEUTROPHILS NFR BLD: 64.6 %
NEUTROPHILS NFR BLD: 64.8 %
NEUTROPHILS NFR BLD: 64.9 %
NEUTROPHILS NFR BLD: 65.1 %
NEUTROPHILS NFR BLD: 65.2 %
NEUTROPHILS NFR BLD: 65.4 %
NEUTROPHILS NFR BLD: 65.4 %
NEUTROPHILS NFR BLD: 65.5 %
NEUTROPHILS NFR BLD: 66.2 %
NEUTROPHILS NFR BLD: 66.5 %
NEUTROPHILS NFR BLD: 66.6 %
NEUTROPHILS NFR BLD: 67.1 %
NEUTROPHILS NFR BLD: 67.8 %
NEUTROPHILS NFR BLD: 67.9 %
NEUTROPHILS NFR BLD: 67.9 %
NEUTROPHILS NFR BLD: 68 %
NEUTROPHILS NFR BLD: 68.2 %
NEUTROPHILS NFR BLD: 68.3 %
NEUTROPHILS NFR BLD: 68.7 %
NEUTROPHILS NFR BLD: 69 %
NEUTROPHILS NFR BLD: 69 %
NEUTROPHILS NFR BLD: 69.8 %
NEUTROPHILS NFR BLD: 69.8 %
NEUTROPHILS NFR BLD: 71 %
NEUTROPHILS NFR BLD: 71.2 %
NEUTROPHILS NFR BLD: 71.8 %
NEUTROPHILS NFR BLD: 72 %
NEUTROPHILS NFR BLD: 72.2 %
NEUTROPHILS NFR BLD: 72.2 %
NEUTROPHILS NFR BLD: 72.6 %
NEUTROPHILS NFR BLD: 73 %
NEUTROPHILS NFR BLD: 73 %
NEUTROPHILS NFR BLD: 73.1 %
NEUTROPHILS NFR BLD: 73.3 %
NEUTROPHILS NFR BLD: 73.6 %
NEUTROPHILS NFR BLD: 73.9 %
NEUTROPHILS NFR BLD: 73.9 %
NEUTROPHILS NFR BLD: 74 %
NEUTROPHILS NFR BLD: 74.3 %
NEUTROPHILS NFR BLD: 74.6 %
NEUTROPHILS NFR BLD: 75.2 %
NEUTROPHILS NFR BLD: 75.8 %
NEUTROPHILS NFR BLD: 75.9 %
NEUTROPHILS NFR BLD: 76 %
NEUTROPHILS NFR BLD: 77 %
NEUTROPHILS NFR BLD: 77.5 %
NEUTROPHILS NFR BLD: 78 %
NEUTROPHILS NFR BLD: 78.2 %
NEUTROPHILS NFR BLD: 78.8 %
NEUTROPHILS NFR BLD: 79 %
NEUTROPHILS NFR BLD: 80.5 %
NEUTROPHILS NFR BLD: 81 %
NEUTROPHILS NFR BLD: 82 %
NEUTROPHILS NFR BLD: 83 %
NEUTROPHILS NFR BLD: 83 %
NEUTROPHILS NFR BLD: 84 %
NEUTROPHILS NFR BLD: 84.2 %
NEUTROPHILS NFR BLD: 85.8 %
NEUTROPHILS NFR BLD: 86 %
NEUTROPHILS NFR BLD: 86.3 %
NEUTROPHILS NFR BLD: 87 %
NEUTROPHILS NFR BLD: 87 %
NEUTROPHILS NFR BLD: 87.7 %
NEUTROPHILS NFR BLD: 88 %
NEUTROPHILS NFR BLD: 89 %
NEUTROPHILS NFR BLD: 90 %
NEUTROPHILS NFR BLD: 90.5 %
NEUTROPHILS NFR BLD: 90.6 %
NEUTROPHILS NFR BLD: 90.6 %
NEUTROPHILS NFR BLD: 91 %
NEUTROPHILS NFR BLD: 92 %
NEUTROPHILS NFR BLD: 93 %
NEUTROPHILS NFR BLD: 94 %
NEUTROPHILS NFR BLD: 95 %
NEUTROPHILS NFR BLD: 96 %
NEUTROPHILS NFR BLD: 98 %
NEUTROPHILS NFR BLD: 99 %
NEUTROPHILS NFR FLD MANUAL: 18 %
NEUTROPHILS NFR FLD MANUAL: 70 %
NEUTROPHILS NFR FLD MANUAL: 89 %
NEUTS BAND NFR BLD MANUAL: 1 %
NEUTS BAND NFR BLD MANUAL: 2 %
NEUTS BAND NFR BLD MANUAL: 3 %
NEUTS BAND NFR BLD MANUAL: 4 %
NEUTS BAND NFR BLD MANUAL: 7 %
NIL: 0.05 IU/ML
NITRITE UR QL STRIP: NEGATIVE
NITRITE UR QL STRIP: NEGATIVE
NONHDLC SERPL-MCNC: ABNORMAL MG/DL
NOROVIRUS GI RNA STL QL NAA+PROBE: NOT DETECTED
NOROVIRUS GII RNA STL QL NAA+PROBE: NOT DETECTED
NRBC BLD-RTO: 0 /100 WBC
NRBC BLD-RTO: 1 /100 WBC
NRBC BLD-RTO: 2 /100 WBC
NUM UNITS TRANS FFP: NORMAL
NUM UNITS TRANS FFP: NORMAL
NUM UNITS TRANS PACKED RBC: NORMAL
O+P STL TRI STN: NORMAL
O+P STL TRI STN: NORMAL
OB PNL STL: NEGATIVE
OB PNL STL: POSITIVE
OSMOLALITY SERPL: 284 MOSM/KG
OVALOCYTES BLD QL SMEAR: ABNORMAL
PANC ISLET CELL IGG SER-ACNC: NORMAL
PARAINFLUENZA: NOT DETECTED
PCO2 BLDA: 24.2 MMHG (ref 35–45)
PCO2 BLDA: 26.1 MMHG (ref 35–45)
PCO2 BLDA: 28.1 MMHG (ref 35–45)
PCO2 BLDA: 28.8 MMHG (ref 35–45)
PCO2 BLDA: 30.1 MMHG (ref 35–45)
PCO2 BLDA: 30.7 MMHG (ref 35–45)
PCO2 BLDA: 30.7 MMHG (ref 35–45)
PCO2 BLDA: 31.3 MMHG (ref 35–45)
PCO2 BLDA: 32.8 MMHG (ref 35–45)
PCO2 BLDA: 32.8 MMHG (ref 35–45)
PCO2 BLDA: 33.1 MMHG (ref 35–45)
PCO2 BLDA: 33.7 MMHG (ref 35–45)
PCO2 BLDA: 33.8 MMHG (ref 35–45)
PCO2 BLDA: 34.9 MMHG (ref 35–45)
PCO2 BLDA: 34.9 MMHG (ref 35–45)
PCO2 BLDA: 35.1 MMHG (ref 35–45)
PCO2 BLDA: 35.5 MMHG (ref 35–45)
PCO2 BLDA: 35.6 MMHG (ref 35–45)
PCO2 BLDA: 35.6 MMHG (ref 35–45)
PCO2 BLDA: 36.1 MMHG (ref 35–45)
PCO2 BLDA: 36.8 MMHG (ref 35–45)
PCO2 BLDA: 36.8 MMHG (ref 35–45)
PCO2 BLDA: 36.9 MMHG (ref 35–45)
PCO2 BLDA: 37.3 MMHG (ref 35–45)
PCO2 BLDA: 38.7 MMHG (ref 35–45)
PCO2 BLDA: 39.1 MMHG (ref 35–45)
PCO2 BLDA: 39.4 MMHG (ref 35–45)
PCO2 BLDA: 39.6 MMHG (ref 35–45)
PCO2 BLDA: 40 MMHG (ref 35–45)
PCO2 BLDA: 40.5 MMHG (ref 35–45)
PCO2 BLDA: 40.8 MMHG (ref 35–45)
PCO2 BLDA: 41.2 MMHG (ref 35–45)
PCO2 BLDA: 41.8 MMHG (ref 35–45)
PCO2 BLDA: 42.2 MMHG (ref 35–45)
PCO2 BLDA: 42.3 MMHG (ref 35–45)
PCO2 BLDA: 42.6 MMHG (ref 35–45)
PCO2 BLDA: 42.7 MMHG (ref 35–45)
PCO2 BLDA: 43.3 MMHG (ref 35–45)
PCO2 BLDA: 43.3 MMHG (ref 35–45)
PCO2 BLDA: 43.4 MMHG (ref 35–45)
PCO2 BLDA: 43.6 MMHG (ref 35–45)
PCO2 BLDA: 43.8 MMHG (ref 35–45)
PCO2 BLDA: 44.4 MMHG (ref 35–45)
PCO2 BLDA: 45.4 MMHG (ref 35–45)
PCO2 BLDA: 45.5 MMHG (ref 35–45)
PCO2 BLDA: 46.5 MMHG (ref 35–45)
PCO2 BLDA: 47.1 MMHG (ref 35–45)
PCO2 BLDA: 47.9 MMHG (ref 35–45)
PCO2 BLDA: 47.9 MMHG (ref 35–45)
PCO2 BLDA: 48.3 MMHG (ref 35–45)
PCO2 BLDA: 48.4 MMHG (ref 35–45)
PCO2 BLDA: 48.6 MMHG (ref 35–45)
PCO2 BLDA: 50.1 MMHG (ref 35–45)
PCO2 BLDA: 50.2 MMHG (ref 35–45)
PCO2 BLDA: 50.9 MMHG (ref 35–45)
PCO2 BLDA: 51 MMHG (ref 35–45)
PCO2 BLDA: 51 MMHG (ref 35–45)
PCO2 BLDA: 51.6 MMHG (ref 35–45)
PCO2 BLDA: 55.6 MMHG (ref 35–45)
PEEP: 5
PH SMN: 7.1 [PH] (ref 7.35–7.45)
PH SMN: 7.1 [PH] (ref 7.35–7.45)
PH SMN: 7.12 [PH] (ref 7.35–7.45)
PH SMN: 7.13 [PH] (ref 7.35–7.45)
PH SMN: 7.18 [PH] (ref 7.35–7.45)
PH SMN: 7.2 [PH] (ref 7.35–7.45)
PH SMN: 7.22 [PH] (ref 7.35–7.45)
PH SMN: 7.25 [PH] (ref 7.35–7.45)
PH SMN: 7.26 [PH] (ref 7.35–7.45)
PH SMN: 7.26 [PH] (ref 7.35–7.45)
PH SMN: 7.28 [PH] (ref 7.35–7.45)
PH SMN: 7.28 [PH] (ref 7.35–7.45)
PH SMN: 7.29 [PH] (ref 7.35–7.45)
PH SMN: 7.29 [PH] (ref 7.35–7.45)
PH SMN: 7.3 [PH] (ref 7.35–7.45)
PH SMN: 7.31 [PH] (ref 7.35–7.45)
PH SMN: 7.32 [PH] (ref 7.35–7.45)
PH SMN: 7.32 [PH] (ref 7.35–7.45)
PH SMN: 7.33 [PH] (ref 7.35–7.45)
PH SMN: 7.35 [PH] (ref 7.35–7.45)
PH SMN: 7.36 [PH] (ref 7.35–7.45)
PH SMN: 7.37 [PH] (ref 7.35–7.45)
PH SMN: 7.38 [PH] (ref 7.35–7.45)
PH SMN: 7.39 [PH] (ref 7.35–7.45)
PH SMN: 7.4 [PH] (ref 7.35–7.45)
PH SMN: 7.4 [PH] (ref 7.35–7.45)
PH SMN: 7.41 [PH] (ref 7.35–7.45)
PH SMN: 7.41 [PH] (ref 7.35–7.45)
PH SMN: 7.42 [PH] (ref 7.35–7.45)
PH SMN: 7.43 [PH] (ref 7.35–7.45)
PH SMN: 7.44 [PH] (ref 7.35–7.45)
PH SMN: 7.44 [PH] (ref 7.35–7.45)
PH SMN: 7.45 [PH] (ref 7.35–7.45)
PH SMN: 7.46 [PH] (ref 7.35–7.45)
PH SMN: 7.48 [PH] (ref 7.35–7.45)
PH SMN: 7.49 [PH] (ref 7.35–7.45)
PH SMN: 7.5 [PH] (ref 7.35–7.45)
PH UR STRIP: 5 [PH] (ref 5–8)
PH UR STRIP: 5 [PH] (ref 5–8)
PHOSPHATE SERPL-MCNC: 1 MG/DL
PHOSPHATE SERPL-MCNC: 1.2 MG/DL
PHOSPHATE SERPL-MCNC: 1.3 MG/DL
PHOSPHATE SERPL-MCNC: 1.3 MG/DL
PHOSPHATE SERPL-MCNC: 1.4 MG/DL
PHOSPHATE SERPL-MCNC: 1.5 MG/DL
PHOSPHATE SERPL-MCNC: 1.6 MG/DL
PHOSPHATE SERPL-MCNC: 1.6 MG/DL
PHOSPHATE SERPL-MCNC: 1.7 MG/DL
PHOSPHATE SERPL-MCNC: 1.8 MG/DL
PHOSPHATE SERPL-MCNC: 1.9 MG/DL
PHOSPHATE SERPL-MCNC: 2 MG/DL
PHOSPHATE SERPL-MCNC: 2.1 MG/DL
PHOSPHATE SERPL-MCNC: 2.2 MG/DL
PHOSPHATE SERPL-MCNC: 2.3 MG/DL
PHOSPHATE SERPL-MCNC: 2.3 MG/DL
PHOSPHATE SERPL-MCNC: 2.4 MG/DL
PHOSPHATE SERPL-MCNC: 2.5 MG/DL
PHOSPHATE SERPL-MCNC: 2.6 MG/DL
PHOSPHATE SERPL-MCNC: 2.7 MG/DL
PHOSPHATE SERPL-MCNC: 2.8 MG/DL
PHOSPHATE SERPL-MCNC: 2.8 MG/DL
PHOSPHATE SERPL-MCNC: 2.9 MG/DL
PHOSPHATE SERPL-MCNC: 3 MG/DL
PHOSPHATE SERPL-MCNC: 3.1 MG/DL
PHOSPHATE SERPL-MCNC: 3.2 MG/DL
PHOSPHATE SERPL-MCNC: 3.3 MG/DL
PHOSPHATE SERPL-MCNC: 3.4 MG/DL
PHOSPHATE SERPL-MCNC: 3.5 MG/DL
PHOSPHATE SERPL-MCNC: 3.6 MG/DL
PHOSPHATE SERPL-MCNC: 3.6 MG/DL
PHOSPHATE SERPL-MCNC: 3.7 MG/DL
PHOSPHATE SERPL-MCNC: 3.8 MG/DL
PHOSPHATE SERPL-MCNC: 3.9 MG/DL
PHOSPHATE SERPL-MCNC: 4 MG/DL
PHOSPHATE SERPL-MCNC: 4.5 MG/DL
PHOSPHATE SERPL-MCNC: 4.7 MG/DL
PHOSPHATE SERPL-MCNC: 4.9 MG/DL
PHOSPHATE SERPL-MCNC: 5 MG/DL
PHOSPHATE SERPL-MCNC: 5.3 MG/DL
PHOSPHATE SERPL-MCNC: 5.4 MG/DL
PHOSPHATE SERPL-MCNC: 5.7 MG/DL
PHOSPHATE SERPL-MCNC: <1 MG/DL
PIP: 21
PIP: 25
PIP: 29
PIP: 36
PIP: 37
PIP: 40
PIP: 40
PIP: 41
PIP: 43
PLATELET # BLD AUTO: 102 K/UL
PLATELET # BLD AUTO: 102 K/UL
PLATELET # BLD AUTO: 104 K/UL
PLATELET # BLD AUTO: 104 K/UL
PLATELET # BLD AUTO: 107 K/UL
PLATELET # BLD AUTO: 109 K/UL
PLATELET # BLD AUTO: 109 K/UL
PLATELET # BLD AUTO: 110 K/UL
PLATELET # BLD AUTO: 110 K/UL
PLATELET # BLD AUTO: 111 K/UL
PLATELET # BLD AUTO: 112 K/UL
PLATELET # BLD AUTO: 113 K/UL
PLATELET # BLD AUTO: 118 K/UL
PLATELET # BLD AUTO: 118 K/UL
PLATELET # BLD AUTO: 119 K/UL
PLATELET # BLD AUTO: 119 K/UL
PLATELET # BLD AUTO: 122 K/UL
PLATELET # BLD AUTO: 124 K/UL
PLATELET # BLD AUTO: 125 K/UL
PLATELET # BLD AUTO: 126 K/UL
PLATELET # BLD AUTO: 126 K/UL
PLATELET # BLD AUTO: 127 K/UL
PLATELET # BLD AUTO: 129 K/UL
PLATELET # BLD AUTO: 133 K/UL
PLATELET # BLD AUTO: 133 K/UL
PLATELET # BLD AUTO: 136 K/UL
PLATELET # BLD AUTO: 139 K/UL
PLATELET # BLD AUTO: 140 K/UL
PLATELET # BLD AUTO: 145 K/UL
PLATELET # BLD AUTO: 147 K/UL
PLATELET # BLD AUTO: 148 K/UL
PLATELET # BLD AUTO: 148 K/UL
PLATELET # BLD AUTO: 150 K/UL
PLATELET # BLD AUTO: 152 K/UL
PLATELET # BLD AUTO: 162 K/UL
PLATELET # BLD AUTO: 165 K/UL
PLATELET # BLD AUTO: 166 K/UL
PLATELET # BLD AUTO: 168 K/UL
PLATELET # BLD AUTO: 168 K/UL
PLATELET # BLD AUTO: 169 K/UL
PLATELET # BLD AUTO: 175 K/UL
PLATELET # BLD AUTO: 177 K/UL
PLATELET # BLD AUTO: 178 K/UL
PLATELET # BLD AUTO: 179 K/UL
PLATELET # BLD AUTO: 181 K/UL
PLATELET # BLD AUTO: 183 K/UL
PLATELET # BLD AUTO: 184 K/UL
PLATELET # BLD AUTO: 185 K/UL
PLATELET # BLD AUTO: 185 K/UL
PLATELET # BLD AUTO: 187 K/UL
PLATELET # BLD AUTO: 189 K/UL
PLATELET # BLD AUTO: 190 K/UL
PLATELET # BLD AUTO: 190 K/UL
PLATELET # BLD AUTO: 191 K/UL
PLATELET # BLD AUTO: 193 K/UL
PLATELET # BLD AUTO: 194 K/UL
PLATELET # BLD AUTO: 196 K/UL
PLATELET # BLD AUTO: 196 K/UL
PLATELET # BLD AUTO: 201 K/UL
PLATELET # BLD AUTO: 203 K/UL
PLATELET # BLD AUTO: 204 K/UL
PLATELET # BLD AUTO: 208 K/UL
PLATELET # BLD AUTO: 211 K/UL
PLATELET # BLD AUTO: 212 K/UL
PLATELET # BLD AUTO: 214 K/UL
PLATELET # BLD AUTO: 215 K/UL
PLATELET # BLD AUTO: 215 K/UL
PLATELET # BLD AUTO: 217 K/UL
PLATELET # BLD AUTO: 218 K/UL
PLATELET # BLD AUTO: 224 K/UL
PLATELET # BLD AUTO: 225 K/UL
PLATELET # BLD AUTO: 230 K/UL
PLATELET # BLD AUTO: 231 K/UL
PLATELET # BLD AUTO: 231 K/UL
PLATELET # BLD AUTO: 235 K/UL
PLATELET # BLD AUTO: 238 K/UL
PLATELET # BLD AUTO: 238 K/UL
PLATELET # BLD AUTO: 240 K/UL
PLATELET # BLD AUTO: 244 K/UL
PLATELET # BLD AUTO: 246 K/UL
PLATELET # BLD AUTO: 249 K/UL
PLATELET # BLD AUTO: 249 K/UL
PLATELET # BLD AUTO: 250 K/UL
PLATELET # BLD AUTO: 254 K/UL
PLATELET # BLD AUTO: 256 K/UL
PLATELET # BLD AUTO: 262 K/UL
PLATELET # BLD AUTO: 263 K/UL
PLATELET # BLD AUTO: 265 K/UL
PLATELET # BLD AUTO: 265 K/UL
PLATELET # BLD AUTO: 275 K/UL
PLATELET # BLD AUTO: 281 K/UL
PLATELET # BLD AUTO: 284 K/UL
PLATELET # BLD AUTO: 286 K/UL
PLATELET # BLD AUTO: 287 K/UL
PLATELET # BLD AUTO: 29 K/UL
PLATELET # BLD AUTO: 299 K/UL
PLATELET # BLD AUTO: 305 K/UL
PLATELET # BLD AUTO: 308 K/UL
PLATELET # BLD AUTO: 315 K/UL
PLATELET # BLD AUTO: 320 K/UL
PLATELET # BLD AUTO: 322 K/UL
PLATELET # BLD AUTO: 348 K/UL
PLATELET # BLD AUTO: 358 K/UL
PLATELET # BLD AUTO: 359 K/UL
PLATELET # BLD AUTO: 365 K/UL
PLATELET # BLD AUTO: 365 K/UL
PLATELET # BLD AUTO: 372 K/UL
PLATELET # BLD AUTO: 377 K/UL
PLATELET # BLD AUTO: 420 K/UL
PLATELET # BLD AUTO: 43 K/UL
PLATELET # BLD AUTO: 502 K/UL
PLATELET # BLD AUTO: 55 K/UL
PLATELET # BLD AUTO: 59 K/UL
PLATELET # BLD AUTO: 61 K/UL
PLATELET # BLD AUTO: 62 K/UL
PLATELET # BLD AUTO: 68 K/UL
PLATELET # BLD AUTO: 69 K/UL
PLATELET # BLD AUTO: 69 K/UL
PLATELET # BLD AUTO: 78 K/UL
PLATELET # BLD AUTO: 85 K/UL
PLATELET # BLD AUTO: 88 K/UL
PLATELET # BLD AUTO: 89 K/UL
PLATELET # BLD AUTO: 91 K/UL
PLATELET # BLD AUTO: 92 K/UL
PLATELET # BLD AUTO: 92 K/UL
PLATELET # BLD AUTO: 99 K/UL
PLATELET # BLD AUTO: ABNORMAL K/UL
PLATELET BLD QL SMEAR: ABNORMAL
PMV BLD AUTO: 10 FL
PMV BLD AUTO: 10.1 FL
PMV BLD AUTO: 10.2 FL
PMV BLD AUTO: 10.3 FL
PMV BLD AUTO: 10.4 FL
PMV BLD AUTO: 10.5 FL
PMV BLD AUTO: 10.6 FL
PMV BLD AUTO: 10.7 FL
PMV BLD AUTO: 10.8 FL
PMV BLD AUTO: 10.9 FL
PMV BLD AUTO: 10.9 FL
PMV BLD AUTO: 11 FL
PMV BLD AUTO: 11 FL
PMV BLD AUTO: 11.1 FL
PMV BLD AUTO: 11.3 FL
PMV BLD AUTO: 11.4 FL
PMV BLD AUTO: 11.4 FL
PMV BLD AUTO: 11.5 FL
PMV BLD AUTO: 11.6 FL
PMV BLD AUTO: 11.7 FL
PMV BLD AUTO: 11.8 FL
PMV BLD AUTO: 11.8 FL
PMV BLD AUTO: 11.9 FL
PMV BLD AUTO: 12 FL
PMV BLD AUTO: 12 FL
PMV BLD AUTO: 12.1 FL
PMV BLD AUTO: 12.1 FL
PMV BLD AUTO: 12.6 FL
PMV BLD AUTO: 8.5 FL
PMV BLD AUTO: 8.8 FL
PMV BLD AUTO: 8.9 FL
PMV BLD AUTO: 9.1 FL
PMV BLD AUTO: 9.2 FL
PMV BLD AUTO: 9.3 FL
PMV BLD AUTO: 9.4 FL
PMV BLD AUTO: 9.5 FL
PMV BLD AUTO: 9.6 FL
PMV BLD AUTO: 9.7 FL
PMV BLD AUTO: 9.8 FL
PMV BLD AUTO: 9.9 FL
PMV BLD AUTO: ABNORMAL FL
PO2 BLDA: 107 MMHG (ref 80–100)
PO2 BLDA: 108 MMHG (ref 80–100)
PO2 BLDA: 111 MMHG (ref 80–100)
PO2 BLDA: 112 MMHG (ref 80–100)
PO2 BLDA: 115 MMHG (ref 80–100)
PO2 BLDA: 115 MMHG (ref 80–100)
PO2 BLDA: 117 MMHG (ref 80–100)
PO2 BLDA: 118 MMHG (ref 80–100)
PO2 BLDA: 122 MMHG (ref 80–100)
PO2 BLDA: 122 MMHG (ref 80–100)
PO2 BLDA: 125 MMHG (ref 80–100)
PO2 BLDA: 126 MMHG (ref 80–100)
PO2 BLDA: 128 MMHG (ref 80–100)
PO2 BLDA: 131 MMHG (ref 80–100)
PO2 BLDA: 134 MMHG (ref 80–100)
PO2 BLDA: 136 MMHG (ref 80–100)
PO2 BLDA: 137 MMHG (ref 80–100)
PO2 BLDA: 148 MMHG (ref 80–100)
PO2 BLDA: 16 MMHG (ref 40–60)
PO2 BLDA: 223 MMHG (ref 80–100)
PO2 BLDA: 24 MMHG (ref 40–60)
PO2 BLDA: 24 MMHG (ref 40–60)
PO2 BLDA: 27 MMHG (ref 40–60)
PO2 BLDA: 28 MMHG (ref 40–60)
PO2 BLDA: 30 MMHG (ref 40–60)
PO2 BLDA: 31 MMHG (ref 40–60)
PO2 BLDA: 32 MMHG (ref 40–60)
PO2 BLDA: 33 MMHG (ref 40–60)
PO2 BLDA: 34 MMHG (ref 40–60)
PO2 BLDA: 35 MMHG (ref 40–60)
PO2 BLDA: 36 MMHG (ref 40–60)
PO2 BLDA: 37 MMHG (ref 40–60)
PO2 BLDA: 37 MMHG (ref 40–60)
PO2 BLDA: 38 MMHG (ref 40–60)
PO2 BLDA: 39 MMHG (ref 40–60)
PO2 BLDA: 39 MMHG (ref 40–60)
PO2 BLDA: 41 MMHG (ref 40–60)
PO2 BLDA: 41 MMHG (ref 40–60)
PO2 BLDA: 42 MMHG (ref 40–60)
PO2 BLDA: 43 MMHG (ref 40–60)
PO2 BLDA: 47 MMHG (ref 40–60)
PO2 BLDA: 47 MMHG (ref 40–60)
PO2 BLDA: 59 MMHG (ref 80–100)
PO2 BLDA: 68 MMHG (ref 80–100)
PO2 BLDA: 69 MMHG (ref 80–100)
PO2 BLDA: 72 MMHG (ref 80–100)
PO2 BLDA: 73 MMHG (ref 80–100)
PO2 BLDA: 74 MMHG (ref 80–100)
PO2 BLDA: 75 MMHG (ref 40–60)
PO2 BLDA: 83 MMHG (ref 80–100)
PO2 BLDA: 84 MMHG (ref 80–100)
POC BE: -1 MMOL/L
POC BE: -10 MMOL/L
POC BE: -11 MMOL/L
POC BE: -13 MMOL/L
POC BE: -14 MMOL/L
POC BE: -14 MMOL/L
POC BE: -2 MMOL/L
POC BE: -3 MMOL/L
POC BE: -3 MMOL/L
POC BE: -5 MMOL/L
POC BE: -6 MMOL/L
POC BE: -6 MMOL/L
POC BE: -7 MMOL/L
POC BE: -7 MMOL/L
POC BE: -8 MMOL/L
POC BE: -9 MMOL/L
POC BE: 0 MMOL/L
POC BE: 1 MMOL/L
POC BE: 16 MMOL/L
POC BE: 2 MMOL/L
POC BE: 3 MMOL/L
POC BE: 4 MMOL/L
POC BE: 5 MMOL/L
POC BE: 5 MMOL/L
POC BE: 6 MMOL/L
POC BE: 8 MMOL/L
POC SATURATED O2: 100 % (ref 95–100)
POC SATURATED O2: 18 % (ref 95–100)
POC SATURATED O2: 38 % (ref 95–100)
POC SATURATED O2: 43 % (ref 95–100)
POC SATURATED O2: 47 % (ref 95–100)
POC SATURATED O2: 49 % (ref 95–100)
POC SATURATED O2: 53 % (ref 95–100)
POC SATURATED O2: 54 % (ref 95–100)
POC SATURATED O2: 59 % (ref 95–100)
POC SATURATED O2: 60 % (ref 95–100)
POC SATURATED O2: 61 % (ref 95–100)
POC SATURATED O2: 61 % (ref 95–100)
POC SATURATED O2: 64 % (ref 95–100)
POC SATURATED O2: 65 % (ref 95–100)
POC SATURATED O2: 66 % (ref 95–100)
POC SATURATED O2: 66 % (ref 95–100)
POC SATURATED O2: 68 % (ref 95–100)
POC SATURATED O2: 68 % (ref 95–100)
POC SATURATED O2: 69 % (ref 95–100)
POC SATURATED O2: 70 % (ref 95–100)
POC SATURATED O2: 70 % (ref 95–100)
POC SATURATED O2: 71 % (ref 95–100)
POC SATURATED O2: 71 % (ref 95–100)
POC SATURATED O2: 72 % (ref 95–100)
POC SATURATED O2: 72 % (ref 95–100)
POC SATURATED O2: 74 % (ref 95–100)
POC SATURATED O2: 74 % (ref 95–100)
POC SATURATED O2: 77 % (ref 95–100)
POC SATURATED O2: 79 % (ref 95–100)
POC SATURATED O2: 82 % (ref 95–100)
POC SATURATED O2: 89 % (ref 95–100)
POC SATURATED O2: 90 % (ref 95–100)
POC SATURATED O2: 90 % (ref 95–100)
POC SATURATED O2: 91 % (ref 95–100)
POC SATURATED O2: 92 % (ref 95–100)
POC SATURATED O2: 93 % (ref 95–100)
POC SATURATED O2: 93 % (ref 95–100)
POC SATURATED O2: 94 % (ref 95–100)
POC SATURATED O2: 95 % (ref 95–100)
POC SATURATED O2: 98 % (ref 95–100)
POC SATURATED O2: 99 % (ref 95–100)
POC TCO2: 14 MMOL/L (ref 23–27)
POC TCO2: 17 MMOL/L (ref 23–27)
POC TCO2: 17 MMOL/L (ref 24–29)
POC TCO2: 17 MMOL/L (ref 24–29)
POC TCO2: 18 MMOL/L (ref 23–27)
POC TCO2: 18 MMOL/L (ref 24–29)
POC TCO2: 18 MMOL/L (ref 24–29)
POC TCO2: 19 MMOL/L (ref 23–27)
POC TCO2: 19 MMOL/L (ref 24–29)
POC TCO2: 20 MMOL/L (ref 23–27)
POC TCO2: 20 MMOL/L (ref 23–27)
POC TCO2: 21 MMOL/L (ref 23–27)
POC TCO2: 22 MMOL/L (ref 23–27)
POC TCO2: 22 MMOL/L (ref 23–27)
POC TCO2: 22 MMOL/L (ref 24–29)
POC TCO2: 22 MMOL/L (ref 24–29)
POC TCO2: 23 MMOL/L (ref 24–29)
POC TCO2: 24 MMOL/L (ref 23–27)
POC TCO2: 24 MMOL/L (ref 24–29)
POC TCO2: 25 MMOL/L (ref 23–27)
POC TCO2: 25 MMOL/L (ref 24–29)
POC TCO2: 26 MMOL/L (ref 23–27)
POC TCO2: 26 MMOL/L (ref 24–29)
POC TCO2: 26 MMOL/L (ref 24–29)
POC TCO2: 27 MMOL/L (ref 23–27)
POC TCO2: 27 MMOL/L (ref 24–29)
POC TCO2: 27 MMOL/L (ref 24–29)
POC TCO2: 28 MMOL/L (ref 23–27)
POC TCO2: 28 MMOL/L (ref 24–29)
POC TCO2: 29 MMOL/L (ref 23–27)
POC TCO2: 29 MMOL/L (ref 23–27)
POC TCO2: 29 MMOL/L (ref 24–29)
POC TCO2: 31 MMOL/L (ref 24–29)
POC TCO2: 32 MMOL/L (ref 24–29)
POC TCO2: 33 MMOL/L (ref 24–29)
POC TCO2: 40 MMOL/L (ref 24–29)
POCT GLUCOSE: 101 MG/DL (ref 70–110)
POCT GLUCOSE: 102 MG/DL (ref 70–110)
POCT GLUCOSE: 104 MG/DL (ref 70–110)
POCT GLUCOSE: 105 MG/DL (ref 70–110)
POCT GLUCOSE: 106 MG/DL (ref 70–110)
POCT GLUCOSE: 107 MG/DL (ref 70–110)
POCT GLUCOSE: 107 MG/DL (ref 70–110)
POCT GLUCOSE: 108 MG/DL (ref 70–110)
POCT GLUCOSE: 108 MG/DL (ref 70–110)
POCT GLUCOSE: 110 MG/DL (ref 70–110)
POCT GLUCOSE: 111 MG/DL (ref 70–110)
POCT GLUCOSE: 112 MG/DL (ref 70–110)
POCT GLUCOSE: 113 MG/DL (ref 70–110)
POCT GLUCOSE: 113 MG/DL (ref 70–110)
POCT GLUCOSE: 114 MG/DL (ref 70–110)
POCT GLUCOSE: 115 MG/DL (ref 70–110)
POCT GLUCOSE: 115 MG/DL (ref 70–110)
POCT GLUCOSE: 116 MG/DL (ref 70–110)
POCT GLUCOSE: 117 MG/DL (ref 70–110)
POCT GLUCOSE: 118 MG/DL (ref 70–110)
POCT GLUCOSE: 119 MG/DL (ref 70–110)
POCT GLUCOSE: 119 MG/DL (ref 70–110)
POCT GLUCOSE: 120 MG/DL (ref 70–110)
POCT GLUCOSE: 121 MG/DL (ref 70–110)
POCT GLUCOSE: 122 MG/DL (ref 70–110)
POCT GLUCOSE: 123 MG/DL (ref 70–110)
POCT GLUCOSE: 124 MG/DL (ref 70–110)
POCT GLUCOSE: 125 MG/DL (ref 70–110)
POCT GLUCOSE: 125 MG/DL (ref 70–110)
POCT GLUCOSE: 126 MG/DL (ref 70–110)
POCT GLUCOSE: 127 MG/DL (ref 70–110)
POCT GLUCOSE: 128 MG/DL (ref 70–110)
POCT GLUCOSE: 129 MG/DL (ref 70–110)
POCT GLUCOSE: 129 MG/DL (ref 70–110)
POCT GLUCOSE: 130 MG/DL (ref 70–110)
POCT GLUCOSE: 130 MG/DL (ref 70–110)
POCT GLUCOSE: 131 MG/DL (ref 70–110)
POCT GLUCOSE: 132 MG/DL (ref 70–110)
POCT GLUCOSE: 133 MG/DL (ref 70–110)
POCT GLUCOSE: 134 MG/DL (ref 70–110)
POCT GLUCOSE: 135 MG/DL (ref 70–110)
POCT GLUCOSE: 136 MG/DL (ref 70–110)
POCT GLUCOSE: 137 MG/DL (ref 70–110)
POCT GLUCOSE: 138 MG/DL (ref 70–110)
POCT GLUCOSE: 139 MG/DL (ref 70–110)
POCT GLUCOSE: 140 MG/DL (ref 70–110)
POCT GLUCOSE: 141 MG/DL (ref 70–110)
POCT GLUCOSE: 142 MG/DL (ref 70–110)
POCT GLUCOSE: 143 MG/DL (ref 70–110)
POCT GLUCOSE: 144 MG/DL (ref 70–110)
POCT GLUCOSE: 145 MG/DL (ref 70–110)
POCT GLUCOSE: 146 MG/DL (ref 70–110)
POCT GLUCOSE: 147 MG/DL (ref 70–110)
POCT GLUCOSE: 148 MG/DL (ref 70–110)
POCT GLUCOSE: 149 MG/DL (ref 70–110)
POCT GLUCOSE: 150 MG/DL (ref 70–110)
POCT GLUCOSE: 151 MG/DL (ref 70–110)
POCT GLUCOSE: 152 MG/DL (ref 70–110)
POCT GLUCOSE: 153 MG/DL (ref 70–110)
POCT GLUCOSE: 154 MG/DL (ref 70–110)
POCT GLUCOSE: 155 MG/DL (ref 70–110)
POCT GLUCOSE: 156 MG/DL (ref 70–110)
POCT GLUCOSE: 156 MG/DL (ref 70–110)
POCT GLUCOSE: 157 MG/DL (ref 70–110)
POCT GLUCOSE: 158 MG/DL (ref 70–110)
POCT GLUCOSE: 159 MG/DL (ref 70–110)
POCT GLUCOSE: 160 MG/DL (ref 70–110)
POCT GLUCOSE: 160 MG/DL (ref 70–110)
POCT GLUCOSE: 161 MG/DL (ref 70–110)
POCT GLUCOSE: 162 MG/DL (ref 70–110)
POCT GLUCOSE: 163 MG/DL (ref 70–110)
POCT GLUCOSE: 164 MG/DL (ref 70–110)
POCT GLUCOSE: 165 MG/DL (ref 70–110)
POCT GLUCOSE: 166 MG/DL (ref 70–110)
POCT GLUCOSE: 167 MG/DL (ref 70–110)
POCT GLUCOSE: 168 MG/DL (ref 70–110)
POCT GLUCOSE: 169 MG/DL (ref 70–110)
POCT GLUCOSE: 170 MG/DL (ref 70–110)
POCT GLUCOSE: 171 MG/DL (ref 70–110)
POCT GLUCOSE: 172 MG/DL (ref 70–110)
POCT GLUCOSE: 172 MG/DL (ref 70–110)
POCT GLUCOSE: 173 MG/DL (ref 70–110)
POCT GLUCOSE: 174 MG/DL (ref 70–110)
POCT GLUCOSE: 175 MG/DL (ref 70–110)
POCT GLUCOSE: 176 MG/DL (ref 70–110)
POCT GLUCOSE: 176 MG/DL (ref 70–110)
POCT GLUCOSE: 177 MG/DL (ref 70–110)
POCT GLUCOSE: 178 MG/DL (ref 70–110)
POCT GLUCOSE: 179 MG/DL (ref 70–110)
POCT GLUCOSE: 179 MG/DL (ref 70–110)
POCT GLUCOSE: 180 MG/DL (ref 70–110)
POCT GLUCOSE: 180 MG/DL (ref 70–110)
POCT GLUCOSE: 181 MG/DL (ref 70–110)
POCT GLUCOSE: 182 MG/DL (ref 70–110)
POCT GLUCOSE: 183 MG/DL (ref 70–110)
POCT GLUCOSE: 184 MG/DL (ref 70–110)
POCT GLUCOSE: 185 MG/DL (ref 70–110)
POCT GLUCOSE: 186 MG/DL (ref 70–110)
POCT GLUCOSE: 186 MG/DL (ref 70–110)
POCT GLUCOSE: 187 MG/DL (ref 70–110)
POCT GLUCOSE: 188 MG/DL (ref 70–110)
POCT GLUCOSE: 189 MG/DL (ref 70–110)
POCT GLUCOSE: 190 MG/DL (ref 70–110)
POCT GLUCOSE: 191 MG/DL (ref 70–110)
POCT GLUCOSE: 192 MG/DL (ref 70–110)
POCT GLUCOSE: 193 MG/DL (ref 70–110)
POCT GLUCOSE: 193 MG/DL (ref 70–110)
POCT GLUCOSE: 194 MG/DL (ref 70–110)
POCT GLUCOSE: 195 MG/DL (ref 70–110)
POCT GLUCOSE: 196 MG/DL (ref 70–110)
POCT GLUCOSE: 197 MG/DL (ref 70–110)
POCT GLUCOSE: 198 MG/DL (ref 70–110)
POCT GLUCOSE: 199 MG/DL (ref 70–110)
POCT GLUCOSE: 200 MG/DL (ref 70–110)
POCT GLUCOSE: 201 MG/DL (ref 70–110)
POCT GLUCOSE: 201 MG/DL (ref 70–110)
POCT GLUCOSE: 202 MG/DL (ref 70–110)
POCT GLUCOSE: 203 MG/DL (ref 70–110)
POCT GLUCOSE: 204 MG/DL (ref 70–110)
POCT GLUCOSE: 205 MG/DL (ref 70–110)
POCT GLUCOSE: 206 MG/DL (ref 70–110)
POCT GLUCOSE: 207 MG/DL (ref 70–110)
POCT GLUCOSE: 207 MG/DL (ref 70–110)
POCT GLUCOSE: 209 MG/DL (ref 70–110)
POCT GLUCOSE: 210 MG/DL (ref 70–110)
POCT GLUCOSE: 211 MG/DL (ref 70–110)
POCT GLUCOSE: 212 MG/DL (ref 70–110)
POCT GLUCOSE: 213 MG/DL (ref 70–110)
POCT GLUCOSE: 214 MG/DL (ref 70–110)
POCT GLUCOSE: 214 MG/DL (ref 70–110)
POCT GLUCOSE: 215 MG/DL (ref 70–110)
POCT GLUCOSE: 215 MG/DL (ref 70–110)
POCT GLUCOSE: 216 MG/DL (ref 70–110)
POCT GLUCOSE: 217 MG/DL (ref 70–110)
POCT GLUCOSE: 219 MG/DL (ref 70–110)
POCT GLUCOSE: 220 MG/DL (ref 70–110)
POCT GLUCOSE: 221 MG/DL (ref 70–110)
POCT GLUCOSE: 221 MG/DL (ref 70–110)
POCT GLUCOSE: 222 MG/DL (ref 70–110)
POCT GLUCOSE: 222 MG/DL (ref 70–110)
POCT GLUCOSE: 223 MG/DL (ref 70–110)
POCT GLUCOSE: 224 MG/DL (ref 70–110)
POCT GLUCOSE: 225 MG/DL (ref 70–110)
POCT GLUCOSE: 226 MG/DL (ref 70–110)
POCT GLUCOSE: 227 MG/DL (ref 70–110)
POCT GLUCOSE: 228 MG/DL (ref 70–110)
POCT GLUCOSE: 229 MG/DL (ref 70–110)
POCT GLUCOSE: 230 MG/DL (ref 70–110)
POCT GLUCOSE: 231 MG/DL (ref 70–110)
POCT GLUCOSE: 231 MG/DL (ref 70–110)
POCT GLUCOSE: 232 MG/DL (ref 70–110)
POCT GLUCOSE: 233 MG/DL (ref 70–110)
POCT GLUCOSE: 235 MG/DL (ref 70–110)
POCT GLUCOSE: 236 MG/DL (ref 70–110)
POCT GLUCOSE: 236 MG/DL (ref 70–110)
POCT GLUCOSE: 237 MG/DL (ref 70–110)
POCT GLUCOSE: 238 MG/DL (ref 70–110)
POCT GLUCOSE: 238 MG/DL (ref 70–110)
POCT GLUCOSE: 239 MG/DL (ref 70–110)
POCT GLUCOSE: 241 MG/DL (ref 70–110)
POCT GLUCOSE: 242 MG/DL (ref 70–110)
POCT GLUCOSE: 242 MG/DL (ref 70–110)
POCT GLUCOSE: 243 MG/DL (ref 70–110)
POCT GLUCOSE: 244 MG/DL (ref 70–110)
POCT GLUCOSE: 244 MG/DL (ref 70–110)
POCT GLUCOSE: 245 MG/DL (ref 70–110)
POCT GLUCOSE: 245 MG/DL (ref 70–110)
POCT GLUCOSE: 246 MG/DL (ref 70–110)
POCT GLUCOSE: 247 MG/DL (ref 70–110)
POCT GLUCOSE: 248 MG/DL (ref 70–110)
POCT GLUCOSE: 249 MG/DL (ref 70–110)
POCT GLUCOSE: 249 MG/DL (ref 70–110)
POCT GLUCOSE: 251 MG/DL (ref 70–110)
POCT GLUCOSE: 252 MG/DL (ref 70–110)
POCT GLUCOSE: 253 MG/DL (ref 70–110)
POCT GLUCOSE: 254 MG/DL (ref 70–110)
POCT GLUCOSE: 254 MG/DL (ref 70–110)
POCT GLUCOSE: 255 MG/DL (ref 70–110)
POCT GLUCOSE: 256 MG/DL (ref 70–110)
POCT GLUCOSE: 256 MG/DL (ref 70–110)
POCT GLUCOSE: 257 MG/DL (ref 70–110)
POCT GLUCOSE: 258 MG/DL (ref 70–110)
POCT GLUCOSE: 259 MG/DL (ref 70–110)
POCT GLUCOSE: 263 MG/DL (ref 70–110)
POCT GLUCOSE: 263 MG/DL (ref 70–110)
POCT GLUCOSE: 264 MG/DL (ref 70–110)
POCT GLUCOSE: 266 MG/DL (ref 70–110)
POCT GLUCOSE: 267 MG/DL (ref 70–110)
POCT GLUCOSE: 267 MG/DL (ref 70–110)
POCT GLUCOSE: 268 MG/DL (ref 70–110)
POCT GLUCOSE: 270 MG/DL (ref 70–110)
POCT GLUCOSE: 271 MG/DL (ref 70–110)
POCT GLUCOSE: 271 MG/DL (ref 70–110)
POCT GLUCOSE: 273 MG/DL (ref 70–110)
POCT GLUCOSE: 274 MG/DL (ref 70–110)
POCT GLUCOSE: 274 MG/DL (ref 70–110)
POCT GLUCOSE: 275 MG/DL (ref 70–110)
POCT GLUCOSE: 276 MG/DL (ref 70–110)
POCT GLUCOSE: 278 MG/DL (ref 70–110)
POCT GLUCOSE: 279 MG/DL (ref 70–110)
POCT GLUCOSE: 283 MG/DL (ref 70–110)
POCT GLUCOSE: 285 MG/DL (ref 70–110)
POCT GLUCOSE: 288 MG/DL (ref 70–110)
POCT GLUCOSE: 288 MG/DL (ref 70–110)
POCT GLUCOSE: 289 MG/DL (ref 70–110)
POCT GLUCOSE: 29 MG/DL (ref 70–110)
POCT GLUCOSE: 290 MG/DL (ref 70–110)
POCT GLUCOSE: 292 MG/DL (ref 70–110)
POCT GLUCOSE: 292 MG/DL (ref 70–110)
POCT GLUCOSE: 293 MG/DL (ref 70–110)
POCT GLUCOSE: 293 MG/DL (ref 70–110)
POCT GLUCOSE: 294 MG/DL (ref 70–110)
POCT GLUCOSE: 298 MG/DL (ref 70–110)
POCT GLUCOSE: 298 MG/DL (ref 70–110)
POCT GLUCOSE: 303 MG/DL (ref 70–110)
POCT GLUCOSE: 305 MG/DL (ref 70–110)
POCT GLUCOSE: 307 MG/DL (ref 70–110)
POCT GLUCOSE: 309 MG/DL (ref 70–110)
POCT GLUCOSE: 31 MG/DL (ref 70–110)
POCT GLUCOSE: 310 MG/DL (ref 70–110)
POCT GLUCOSE: 314 MG/DL (ref 70–110)
POCT GLUCOSE: 315 MG/DL (ref 70–110)
POCT GLUCOSE: 316 MG/DL (ref 70–110)
POCT GLUCOSE: 316 MG/DL (ref 70–110)
POCT GLUCOSE: 318 MG/DL (ref 70–110)
POCT GLUCOSE: 319 MG/DL (ref 70–110)
POCT GLUCOSE: 32 MG/DL (ref 70–110)
POCT GLUCOSE: 321 MG/DL (ref 70–110)
POCT GLUCOSE: 328 MG/DL (ref 70–110)
POCT GLUCOSE: 329 MG/DL (ref 70–110)
POCT GLUCOSE: 333 MG/DL (ref 70–110)
POCT GLUCOSE: 334 MG/DL (ref 70–110)
POCT GLUCOSE: 335 MG/DL (ref 70–110)
POCT GLUCOSE: 336 MG/DL (ref 70–110)
POCT GLUCOSE: 336 MG/DL (ref 70–110)
POCT GLUCOSE: 340 MG/DL (ref 70–110)
POCT GLUCOSE: 340 MG/DL (ref 70–110)
POCT GLUCOSE: 341 MG/DL (ref 70–110)
POCT GLUCOSE: 346 MG/DL (ref 70–110)
POCT GLUCOSE: 347 MG/DL (ref 70–110)
POCT GLUCOSE: 355 MG/DL (ref 70–110)
POCT GLUCOSE: 356 MG/DL (ref 70–110)
POCT GLUCOSE: 362 MG/DL (ref 70–110)
POCT GLUCOSE: 371 MG/DL (ref 70–110)
POCT GLUCOSE: 373 MG/DL (ref 70–110)
POCT GLUCOSE: 374 MG/DL (ref 70–110)
POCT GLUCOSE: 375 MG/DL (ref 70–110)
POCT GLUCOSE: 376 MG/DL (ref 70–110)
POCT GLUCOSE: 382 MG/DL (ref 70–110)
POCT GLUCOSE: 39 MG/DL (ref 70–110)
POCT GLUCOSE: 396 MG/DL (ref 70–110)
POCT GLUCOSE: 401 MG/DL (ref 70–110)
POCT GLUCOSE: 411 MG/DL (ref 70–110)
POCT GLUCOSE: 418 MG/DL (ref 70–110)
POCT GLUCOSE: 42 MG/DL (ref 70–110)
POCT GLUCOSE: 445 MG/DL (ref 70–110)
POCT GLUCOSE: 45 MG/DL (ref 70–110)
POCT GLUCOSE: 46 MG/DL (ref 70–110)
POCT GLUCOSE: 49 MG/DL (ref 70–110)
POCT GLUCOSE: 50 MG/DL (ref 70–110)
POCT GLUCOSE: 52 MG/DL (ref 70–110)
POCT GLUCOSE: 53 MG/DL (ref 70–110)
POCT GLUCOSE: 54 MG/DL (ref 70–110)
POCT GLUCOSE: 54 MG/DL (ref 70–110)
POCT GLUCOSE: 57 MG/DL (ref 70–110)
POCT GLUCOSE: 57 MG/DL (ref 70–110)
POCT GLUCOSE: 60 MG/DL (ref 70–110)
POCT GLUCOSE: 62 MG/DL (ref 70–110)
POCT GLUCOSE: 63 MG/DL (ref 70–110)
POCT GLUCOSE: 63 MG/DL (ref 70–110)
POCT GLUCOSE: 70 MG/DL (ref 70–110)
POCT GLUCOSE: 72 MG/DL (ref 70–110)
POCT GLUCOSE: 73 MG/DL (ref 70–110)
POCT GLUCOSE: 73 MG/DL (ref 70–110)
POCT GLUCOSE: 74 MG/DL (ref 70–110)
POCT GLUCOSE: 76 MG/DL (ref 70–110)
POCT GLUCOSE: 76 MG/DL (ref 70–110)
POCT GLUCOSE: 79 MG/DL (ref 70–110)
POCT GLUCOSE: 81 MG/DL (ref 70–110)
POCT GLUCOSE: 85 MG/DL (ref 70–110)
POCT GLUCOSE: 86 MG/DL (ref 70–110)
POCT GLUCOSE: 86 MG/DL (ref 70–110)
POCT GLUCOSE: 87 MG/DL (ref 70–110)
POCT GLUCOSE: 88 MG/DL (ref 70–110)
POCT GLUCOSE: 91 MG/DL (ref 70–110)
POCT GLUCOSE: 92 MG/DL (ref 70–110)
POCT GLUCOSE: 93 MG/DL (ref 70–110)
POCT GLUCOSE: 94 MG/DL (ref 70–110)
POCT GLUCOSE: 94 MG/DL (ref 70–110)
POCT GLUCOSE: 95 MG/DL (ref 70–110)
POCT GLUCOSE: 96 MG/DL (ref 70–110)
POCT GLUCOSE: 99 MG/DL (ref 70–110)
POIKILOCYTOSIS BLD QL SMEAR: ABNORMAL
POIKILOCYTOSIS BLD QL SMEAR: SLIGHT
POLYCHROMASIA BLD QL SMEAR: ABNORMAL
POTASSIUM SERPL-SCNC: 3.1 MMOL/L
POTASSIUM SERPL-SCNC: 3.2 MMOL/L
POTASSIUM SERPL-SCNC: 3.3 MMOL/L
POTASSIUM SERPL-SCNC: 3.4 MMOL/L
POTASSIUM SERPL-SCNC: 3.4 MMOL/L
POTASSIUM SERPL-SCNC: 3.5 MMOL/L
POTASSIUM SERPL-SCNC: 3.6 MMOL/L
POTASSIUM SERPL-SCNC: 3.7 MMOL/L
POTASSIUM SERPL-SCNC: 3.8 MMOL/L
POTASSIUM SERPL-SCNC: 3.9 MMOL/L
POTASSIUM SERPL-SCNC: 4 MMOL/L
POTASSIUM SERPL-SCNC: 4.1 MMOL/L
POTASSIUM SERPL-SCNC: 4.2 MMOL/L
POTASSIUM SERPL-SCNC: 4.3 MMOL/L
POTASSIUM SERPL-SCNC: 4.4 MMOL/L
POTASSIUM SERPL-SCNC: 4.5 MMOL/L
POTASSIUM SERPL-SCNC: 4.6 MMOL/L
POTASSIUM SERPL-SCNC: 4.7 MMOL/L
POTASSIUM SERPL-SCNC: 4.8 MMOL/L
POTASSIUM SERPL-SCNC: 4.9 MMOL/L
POTASSIUM SERPL-SCNC: 5 MMOL/L
POTASSIUM SERPL-SCNC: 5.1 MMOL/L
POTASSIUM SERPL-SCNC: 5.2 MMOL/L
POTASSIUM SERPL-SCNC: 5.3 MMOL/L
POTASSIUM SERPL-SCNC: 5.4 MMOL/L
POTASSIUM SERPL-SCNC: 5.5 MMOL/L
POTASSIUM SERPL-SCNC: 5.5 MMOL/L
POTASSIUM SERPL-SCNC: 5.6 MMOL/L
POTASSIUM SERPL-SCNC: 5.8 MMOL/L
POTASSIUM SERPL-SCNC: 5.8 MMOL/L
POTASSIUM SERPL-SCNC: 5.9 MMOL/L
POTASSIUM SERPL-SCNC: 6.2 MMOL/L
PREALB SERPL-MCNC: 13 MG/DL
PREALB SERPL-MCNC: 5 MG/DL
PROCALCITONIN SERPL IA-MCNC: 0.18 NG/ML
PROMYELOCYTES NFR BLD MANUAL: 1 %
PROT SERPL-MCNC: 5 G/DL
PROT SERPL-MCNC: 5.1 G/DL
PROT SERPL-MCNC: 5.1 G/DL
PROT SERPL-MCNC: 5.2 G/DL
PROT SERPL-MCNC: 5.3 G/DL
PROT SERPL-MCNC: 5.4 G/DL
PROT SERPL-MCNC: 5.5 G/DL
PROT SERPL-MCNC: 5.6 G/DL
PROT SERPL-MCNC: 5.7 G/DL
PROT SERPL-MCNC: 5.8 G/DL
PROT SERPL-MCNC: 5.8 G/DL
PROT SERPL-MCNC: 5.9 G/DL
PROT SERPL-MCNC: 6 G/DL
PROT SERPL-MCNC: 6.1 G/DL
PROT SERPL-MCNC: 6.2 G/DL
PROT SERPL-MCNC: 6.3 G/DL
PROT SERPL-MCNC: 6.4 G/DL
PROT SERPL-MCNC: 6.5 G/DL
PROT SERPL-MCNC: 6.6 G/DL
PROT SERPL-MCNC: 6.6 G/DL
PROT SERPL-MCNC: 6.7 G/DL
PROT SERPL-MCNC: 6.8 G/DL
PROT SERPL-MCNC: 6.9 G/DL
PROT SERPL-MCNC: 7 G/DL
PROT UR QL STRIP: ABNORMAL
PROT UR QL STRIP: NEGATIVE
PROT UR-MCNC: 36 MG/DL
PROT/CREAT RATIO, UR: 0.23
PROTHROMBIN TIME: 10.1 SEC
PROTHROMBIN TIME: 10.3 SEC
PROTHROMBIN TIME: 10.6 SEC
PROTHROMBIN TIME: 10.7 SEC
PROTHROMBIN TIME: 10.9 SEC
PROTHROMBIN TIME: 11.3 SEC
PROTHROMBIN TIME: 11.3 SEC
PROTHROMBIN TIME: 11.4 SEC
PROTHROMBIN TIME: 11.5 SEC
PROTHROMBIN TIME: 12 SEC
PROTHROMBIN TIME: 12.1 SEC
PROTHROMBIN TIME: 12.5 SEC
PROTHROMBIN TIME: 13.6 SEC
PROTHROMBIN TIME: 13.6 SEC
PROTHROMBIN TIME: 13.7 SEC
PROTHROMBIN TIME: 13.8 SEC
PROTHROMBIN TIME: 13.9 SEC
PROTHROMBIN TIME: 14.1 SEC
PROTHROMBIN TIME: 15.3 SEC
PROTHROMBIN TIME: 15.4 SEC
PROTHROMBIN TIME: 15.4 SEC
PROTHROMBIN TIME: 16.4 SEC
PROTHROMBIN TIME: 16.9 SEC
PROTHROMBIN TIME: 17 SEC
PROTHROMBIN TIME: 17.3 SEC
PROTHROMBIN TIME: 20.1 SEC
PROVIDER CREDENTIALS: ABNORMAL
PROVIDER NOTIFIED: ABNORMAL
PS: 10
PS: 12
PS: 5
PS: 5
PTH-INTACT SERPL-MCNC: 23 PG/ML
RBC # BLD AUTO: 1.9 M/UL
RBC # BLD AUTO: 1.96 M/UL
RBC # BLD AUTO: 2 M/UL
RBC # BLD AUTO: 2.09 M/UL
RBC # BLD AUTO: 2.1 M/UL
RBC # BLD AUTO: 2.14 M/UL
RBC # BLD AUTO: 2.16 M/UL
RBC # BLD AUTO: 2.17 M/UL
RBC # BLD AUTO: 2.21 M/UL
RBC # BLD AUTO: 2.22 M/UL
RBC # BLD AUTO: 2.23 M/UL
RBC # BLD AUTO: 2.23 M/UL
RBC # BLD AUTO: 2.24 M/UL
RBC # BLD AUTO: 2.24 M/UL
RBC # BLD AUTO: 2.25 M/UL
RBC # BLD AUTO: 2.25 M/UL
RBC # BLD AUTO: 2.26 M/UL
RBC # BLD AUTO: 2.26 M/UL
RBC # BLD AUTO: 2.3 M/UL
RBC # BLD AUTO: 2.32 M/UL
RBC # BLD AUTO: 2.34 M/UL
RBC # BLD AUTO: 2.36 M/UL
RBC # BLD AUTO: 2.39 M/UL
RBC # BLD AUTO: 2.4 M/UL
RBC # BLD AUTO: 2.41 M/UL
RBC # BLD AUTO: 2.41 M/UL
RBC # BLD AUTO: 2.42 M/UL
RBC # BLD AUTO: 2.43 M/UL
RBC # BLD AUTO: 2.45 M/UL
RBC # BLD AUTO: 2.45 M/UL
RBC # BLD AUTO: 2.47 M/UL
RBC # BLD AUTO: 2.47 M/UL
RBC # BLD AUTO: 2.48 M/UL
RBC # BLD AUTO: 2.5 M/UL
RBC # BLD AUTO: 2.5 M/UL
RBC # BLD AUTO: 2.51 M/UL
RBC # BLD AUTO: 2.52 M/UL
RBC # BLD AUTO: 2.54 M/UL
RBC # BLD AUTO: 2.54 M/UL
RBC # BLD AUTO: 2.55 M/UL
RBC # BLD AUTO: 2.56 M/UL
RBC # BLD AUTO: 2.56 M/UL
RBC # BLD AUTO: 2.57 M/UL
RBC # BLD AUTO: 2.58 M/UL
RBC # BLD AUTO: 2.59 M/UL
RBC # BLD AUTO: 2.59 M/UL
RBC # BLD AUTO: 2.6 M/UL
RBC # BLD AUTO: 2.61 M/UL
RBC # BLD AUTO: 2.61 M/UL
RBC # BLD AUTO: 2.62 M/UL
RBC # BLD AUTO: 2.63 M/UL
RBC # BLD AUTO: 2.64 M/UL
RBC # BLD AUTO: 2.64 M/UL
RBC # BLD AUTO: 2.66 M/UL
RBC # BLD AUTO: 2.69 M/UL
RBC # BLD AUTO: 2.7 M/UL
RBC # BLD AUTO: 2.71 M/UL
RBC # BLD AUTO: 2.71 M/UL
RBC # BLD AUTO: 2.73 M/UL
RBC # BLD AUTO: 2.73 M/UL
RBC # BLD AUTO: 2.74 M/UL
RBC # BLD AUTO: 2.75 M/UL
RBC # BLD AUTO: 2.77 M/UL
RBC # BLD AUTO: 2.78 M/UL
RBC # BLD AUTO: 2.79 M/UL
RBC # BLD AUTO: 2.8 M/UL
RBC # BLD AUTO: 2.8 M/UL
RBC # BLD AUTO: 2.82 M/UL
RBC # BLD AUTO: 2.82 M/UL
RBC # BLD AUTO: 2.83 M/UL
RBC # BLD AUTO: 2.84 M/UL
RBC # BLD AUTO: 2.85 M/UL
RBC # BLD AUTO: 2.85 M/UL
RBC # BLD AUTO: 2.87 M/UL
RBC # BLD AUTO: 2.88 M/UL
RBC # BLD AUTO: 2.89 M/UL
RBC # BLD AUTO: 2.9 M/UL
RBC # BLD AUTO: 2.91 M/UL
RBC # BLD AUTO: 2.92 M/UL
RBC # BLD AUTO: 2.93 M/UL
RBC # BLD AUTO: 2.94 M/UL
RBC # BLD AUTO: 2.96 M/UL
RBC # BLD AUTO: 2.97 M/UL
RBC # BLD AUTO: 2.97 M/UL
RBC # BLD AUTO: 2.98 M/UL
RBC # BLD AUTO: 3.01 M/UL
RBC # BLD AUTO: 3.05 M/UL
RBC # BLD AUTO: 3.07 M/UL
RBC # BLD AUTO: 3.08 M/UL
RBC # BLD AUTO: 3.14 M/UL
RBC # BLD AUTO: 3.14 M/UL
RBC # BLD AUTO: 3.15 M/UL
RBC # BLD AUTO: 3.17 M/UL
RBC # BLD AUTO: 3.34 M/UL
RBC #/AREA URNS AUTO: 0 /HPF (ref 0–4)
RETICS/RBC NFR AUTO: 4.2 %
RETIRED EF AND QEF - SEE NOTES: 50 (ref 55–65)
RETIRED EF AND QEF - SEE NOTES: 50 (ref 55–65)
RETIRED EF AND QEF - SEE NOTES: 55 (ref 55–65)
RETIRED EF AND QEF - SEE NOTES: 55 (ref 55–65)
RETIRED EF AND QEF - SEE NOTES: 65 (ref 55–65)
RETIRED EF AND QEF - SEE NOTES: 65 (ref 55–65)
RH BLD: NORMAL
RH BLD: NORMAL
RV AG STL QL IA.RAPID: NEGATIVE
RVP - ADENOVIRUS: NOT DETECTED
RVP - HUMAN METAPNEUMOVIRUS (HMPV): NOT DETECTED
RVP - INFLUENZA A: NOT DETECTED
RVP - INFLUENZA B: NOT DETECTED
RVP - RESPIRATORY SYNCTIAL VIRUS (RSV) A: NOT DETECTED
RVP - RESPIRATORY SYNCTIAL VIRUS (RSV) A: POSITIVE
RVP - RESPIRATORY VIRAL PANEL, SOURCE: ABNORMAL
RVP - RESPIRATORY VIRAL PANEL, SOURCE: NORMAL
RVP - RHINOVIRUS: NOT DETECTED
SAMPLE: ABNORMAL
SAMPLE: NORMAL
SATURATED IRON: 35 %
SCHISTOCYTES BLD QL SMEAR: ABNORMAL
SCHISTOCYTES BLD QL SMEAR: PRESENT
SERUM COLLECTION DT - LUMINEX CLASS I: NORMAL
SERUM COLLECTION DT - LUMINEX CLASS I: NORMAL
SERUM COLLECTION DT - LUMINEX CLASS II: NORMAL
SERUM COLLECTION DT - LUMINEX CLASS II: NORMAL
SITE: ABNORMAL
SITE: NORMAL
SMUDGE CELLS BLD QL SMEAR: PRESENT
SODIUM SERPL-SCNC: 129 MMOL/L
SODIUM SERPL-SCNC: 129 MMOL/L
SODIUM SERPL-SCNC: 130 MMOL/L
SODIUM SERPL-SCNC: 131 MMOL/L
SODIUM SERPL-SCNC: 132 MMOL/L
SODIUM SERPL-SCNC: 133 MMOL/L
SODIUM SERPL-SCNC: 134 MMOL/L
SODIUM SERPL-SCNC: 135 MMOL/L
SODIUM SERPL-SCNC: 136 MMOL/L
SODIUM SERPL-SCNC: 137 MMOL/L
SODIUM SERPL-SCNC: 138 MMOL/L
SODIUM SERPL-SCNC: 139 MMOL/L
SODIUM SERPL-SCNC: 140 MMOL/L
SODIUM SERPL-SCNC: 141 MMOL/L
SODIUM SERPL-SCNC: 142 MMOL/L
SODIUM SERPL-SCNC: 143 MMOL/L
SODIUM SERPL-SCNC: 144 MMOL/L
SODIUM SERPL-SCNC: 145 MMOL/L
SODIUM SERPL-SCNC: 146 MMOL/L
SP GR UR STRIP: 1.01 (ref 1–1.03)
SP GR UR STRIP: 1.02 (ref 1–1.03)
SP02: 100
SP02: 95
SP02: 98
SP02: 99
SPECIMEN SOURCE: NORMAL
SPHEROCYTES BLD QL SMEAR: ABNORMAL
SPONT RATE: 15
SPONT RATE: 19
SPONT RATE: 19
SPONT RATE: 20
SPONT RATE: 28
SQUAMOUS #/AREA URNS AUTO: 0 /HPF
SRA PORCINE INTERP SER-IMP: NEGATIVE
STRONGYLOIDES ANTIBODY IGG: NEGATIVE
T4 FREE SERPL-MCNC: 0.77 NG/DL
T4 FREE SERPL-MCNC: 0.81 NG/DL
T4 FREE SERPL-MCNC: 0.87 NG/DL
T4 FREE SERPL-MCNC: 0.88 NG/DL
T4 FREE SERPL-MCNC: 0.94 NG/DL
T4 FREE SERPL-MCNC: 1.33 NG/DL
T4 FREE SERPL-MCNC: 1.55 NG/DL
T4 SERPL-MCNC: 4.4 UG/DL
TACROLIMUS BLD-MCNC: 1.7 NG/ML
TACROLIMUS BLD-MCNC: 10.1 NG/ML
TACROLIMUS BLD-MCNC: 10.1 NG/ML
TACROLIMUS BLD-MCNC: 10.3 NG/ML
TACROLIMUS BLD-MCNC: 10.6 NG/ML
TACROLIMUS BLD-MCNC: 10.7 NG/ML
TACROLIMUS BLD-MCNC: 10.7 NG/ML
TACROLIMUS BLD-MCNC: 10.9 NG/ML
TACROLIMUS BLD-MCNC: 10.9 NG/ML
TACROLIMUS BLD-MCNC: 11 NG/ML
TACROLIMUS BLD-MCNC: 12.3 NG/ML
TACROLIMUS BLD-MCNC: 12.9 NG/ML
TACROLIMUS BLD-MCNC: 13.5 NG/ML
TACROLIMUS BLD-MCNC: 14.3 NG/ML
TACROLIMUS BLD-MCNC: 14.7 NG/ML
TACROLIMUS BLD-MCNC: 14.9 NG/ML
TACROLIMUS BLD-MCNC: 15.9 NG/ML
TACROLIMUS BLD-MCNC: 2.2 NG/ML
TACROLIMUS BLD-MCNC: 2.3 NG/ML
TACROLIMUS BLD-MCNC: 2.5 NG/ML
TACROLIMUS BLD-MCNC: 2.8 NG/ML
TACROLIMUS BLD-MCNC: 2.9 NG/ML
TACROLIMUS BLD-MCNC: 2.9 NG/ML
TACROLIMUS BLD-MCNC: 3.1 NG/ML
TACROLIMUS BLD-MCNC: 3.1 NG/ML
TACROLIMUS BLD-MCNC: 3.2 NG/ML
TACROLIMUS BLD-MCNC: 3.6 NG/ML
TACROLIMUS BLD-MCNC: 3.7 NG/ML
TACROLIMUS BLD-MCNC: 3.9 NG/ML
TACROLIMUS BLD-MCNC: 4 NG/ML
TACROLIMUS BLD-MCNC: 4.1 NG/ML
TACROLIMUS BLD-MCNC: 4.3 NG/ML
TACROLIMUS BLD-MCNC: 4.4 NG/ML
TACROLIMUS BLD-MCNC: 4.5 NG/ML
TACROLIMUS BLD-MCNC: 4.5 NG/ML
TACROLIMUS BLD-MCNC: 4.7 NG/ML
TACROLIMUS BLD-MCNC: 4.8 NG/ML
TACROLIMUS BLD-MCNC: 4.9 NG/ML
TACROLIMUS BLD-MCNC: 5 NG/ML
TACROLIMUS BLD-MCNC: 5.1 NG/ML
TACROLIMUS BLD-MCNC: 5.3 NG/ML
TACROLIMUS BLD-MCNC: 5.3 NG/ML
TACROLIMUS BLD-MCNC: 5.4 NG/ML
TACROLIMUS BLD-MCNC: 5.4 NG/ML
TACROLIMUS BLD-MCNC: 5.5 NG/ML
TACROLIMUS BLD-MCNC: 5.6 NG/ML
TACROLIMUS BLD-MCNC: 5.6 NG/ML
TACROLIMUS BLD-MCNC: 5.7 NG/ML
TACROLIMUS BLD-MCNC: 5.8 NG/ML
TACROLIMUS BLD-MCNC: 5.9 NG/ML
TACROLIMUS BLD-MCNC: 6 NG/ML
TACROLIMUS BLD-MCNC: 6.1 NG/ML
TACROLIMUS BLD-MCNC: 6.6 NG/ML
TACROLIMUS BLD-MCNC: 6.6 NG/ML
TACROLIMUS BLD-MCNC: 6.7 NG/ML
TACROLIMUS BLD-MCNC: 6.8 NG/ML
TACROLIMUS BLD-MCNC: 6.9 NG/ML
TACROLIMUS BLD-MCNC: 7.1 NG/ML
TACROLIMUS BLD-MCNC: 7.5 NG/ML
TACROLIMUS BLD-MCNC: 7.5 NG/ML
TACROLIMUS BLD-MCNC: 7.6 NG/ML
TACROLIMUS BLD-MCNC: 7.8 NG/ML
TACROLIMUS BLD-MCNC: 8.2 NG/ML
TACROLIMUS BLD-MCNC: 8.3 NG/ML
TACROLIMUS BLD-MCNC: 8.4 NG/ML
TACROLIMUS BLD-MCNC: 8.5 NG/ML
TACROLIMUS BLD-MCNC: 8.9 NG/ML
TACROLIMUS BLD-MCNC: 9 NG/ML
TACROLIMUS BLD-MCNC: 9.1 NG/ML
TACROLIMUS BLD-MCNC: 9.6 NG/ML
TACROLIMUS BLD-MCNC: 9.8 NG/ML
TACROLIMUS BLD-MCNC: 9.8 NG/ML
TARGETS BLD QL SMEAR: ABNORMAL
TB ANTIGEN NIL: -0.01 IU/ML
TB ANTIGEN: 0.03 IU/ML
TB GOLD: ABNORMAL
TB INDURATION 48 - 72 HR READ: 0 MM
TB INDURATION 48 - 72 HR READ: 0 MM
TOTAL IRON BINDING CAPACITY: 225 UG/DL
TOXIC GRANULES BLD QL SMEAR: PRESENT
TRANS ERYTHROCYTES VOL PATIENT: NORMAL ML
TRANS PLATPHERESIS VOL PATIENT: NORMAL ML
TRANSFERRIN SERPL-MCNC: 152 MG/DL
TRICUSPID VALVE REGURGITATION: NORMAL
TRIGL SERPL-MCNC: 431 MG/DL
TROPONIN I SERPL DL<=0.01 NG/ML-MCNC: 0.01 NG/ML
TROPONIN I SERPL DL<=0.01 NG/ML-MCNC: 0.01 NG/ML
TROPONIN I SERPL DL<=0.01 NG/ML-MCNC: <0.006 NG/ML
TSH SERPL DL<=0.005 MIU/L-ACNC: 0.08 UIU/ML
TSH SERPL DL<=0.005 MIU/L-ACNC: 0.1 UIU/ML
TSH SERPL DL<=0.005 MIU/L-ACNC: 12.27 UIU/ML
TSH SERPL DL<=0.005 MIU/L-ACNC: 12.75 UIU/ML
TSH SERPL DL<=0.005 MIU/L-ACNC: 4.79 UIU/ML
TSH SERPL DL<=0.005 MIU/L-ACNC: 7.08 UIU/ML
TSH SERPL DL<=0.005 MIU/L-ACNC: 9.41 UIU/ML
UNIT NUMBER: NORMAL
URN SPEC COLLECT METH UR: ABNORMAL
URN SPEC COLLECT METH UR: NORMAL
UROBILINOGEN UR STRIP-ACNC: NEGATIVE EU/DL
UROBILINOGEN UR STRIP-ACNC: NEGATIVE EU/DL
VANCOMYCIN SERPL-MCNC: 11.9 UG/ML
VANCOMYCIN SERPL-MCNC: 14.7 UG/ML
VANCOMYCIN SERPL-MCNC: 15.9 UG/ML
VANCOMYCIN SERPL-MCNC: 16.6 UG/ML
VANCOMYCIN SERPL-MCNC: 17.2 UG/ML
VANCOMYCIN SERPL-MCNC: 19 UG/ML
VANCOMYCIN SERPL-MCNC: 19.5 UG/ML
VANCOMYCIN SERPL-MCNC: 20 UG/ML
VANCOMYCIN SERPL-MCNC: 20.8 UG/ML
VANCOMYCIN SERPL-MCNC: 21.8 UG/ML
VANCOMYCIN SERPL-MCNC: 7.6 UG/ML
VANCOMYCIN TROUGH SERPL-MCNC: 11.3 UG/ML
VANCOMYCIN TROUGH SERPL-MCNC: 16.5 UG/ML
VANCOMYCIN TROUGH SERPL-MCNC: 20.9 UG/ML
VERBAL RESULT READBACK PERFORMED: YES
VIRUS SPEC CULT: NORMAL
VOL: 315
VT: 400
VT: 410
VT: 420
VT: 450
WBC # BLD AUTO: 16.31 K/UL
WBC # BLD AUTO: 2.21 K/UL
WBC # BLD AUTO: 2.26 K/UL
WBC # BLD AUTO: 2.56 K/UL
WBC # BLD AUTO: 2.69 K/UL
WBC # BLD AUTO: 2.71 K/UL
WBC # BLD AUTO: 2.82 K/UL
WBC # BLD AUTO: 2.88 K/UL
WBC # BLD AUTO: 2.91 K/UL
WBC # BLD AUTO: 2.92 K/UL
WBC # BLD AUTO: 2.93 K/UL
WBC # BLD AUTO: 2.98 K/UL
WBC # BLD AUTO: 2.98 K/UL
WBC # BLD AUTO: 3.05 K/UL
WBC # BLD AUTO: 3.06 K/UL
WBC # BLD AUTO: 3.17 K/UL
WBC # BLD AUTO: 3.18 K/UL
WBC # BLD AUTO: 3.19 K/UL
WBC # BLD AUTO: 3.21 K/UL
WBC # BLD AUTO: 3.27 K/UL
WBC # BLD AUTO: 3.28 K/UL
WBC # BLD AUTO: 3.29 K/UL
WBC # BLD AUTO: 3.29 K/UL
WBC # BLD AUTO: 3.3 K/UL
WBC # BLD AUTO: 3.3 K/UL
WBC # BLD AUTO: 3.33 K/UL
WBC # BLD AUTO: 3.35 K/UL
WBC # BLD AUTO: 3.4 K/UL
WBC # BLD AUTO: 3.43 K/UL
WBC # BLD AUTO: 3.46 K/UL
WBC # BLD AUTO: 3.47 K/UL
WBC # BLD AUTO: 3.47 K/UL
WBC # BLD AUTO: 3.49 K/UL
WBC # BLD AUTO: 3.52 K/UL
WBC # BLD AUTO: 3.56 K/UL
WBC # BLD AUTO: 3.57 K/UL
WBC # BLD AUTO: 3.62 K/UL
WBC # BLD AUTO: 3.64 K/UL
WBC # BLD AUTO: 3.65 K/UL
WBC # BLD AUTO: 3.66 K/UL
WBC # BLD AUTO: 3.68 K/UL
WBC # BLD AUTO: 3.69 K/UL
WBC # BLD AUTO: 3.72 K/UL
WBC # BLD AUTO: 3.81 K/UL
WBC # BLD AUTO: 3.82 K/UL
WBC # BLD AUTO: 3.87 K/UL
WBC # BLD AUTO: 3.88 K/UL
WBC # BLD AUTO: 3.91 K/UL
WBC # BLD AUTO: 3.98 K/UL
WBC # BLD AUTO: 4.02 K/UL
WBC # BLD AUTO: 4.02 K/UL
WBC # BLD AUTO: 4.09 K/UL
WBC # BLD AUTO: 4.09 K/UL
WBC # BLD AUTO: 4.1 K/UL
WBC # BLD AUTO: 4.12 K/UL
WBC # BLD AUTO: 4.13 K/UL
WBC # BLD AUTO: 4.22 K/UL
WBC # BLD AUTO: 4.24 K/UL
WBC # BLD AUTO: 4.29 K/UL
WBC # BLD AUTO: 4.3 K/UL
WBC # BLD AUTO: 4.33 K/UL
WBC # BLD AUTO: 4.35 K/UL
WBC # BLD AUTO: 4.39 K/UL
WBC # BLD AUTO: 4.39 K/UL
WBC # BLD AUTO: 4.41 K/UL
WBC # BLD AUTO: 4.45 K/UL
WBC # BLD AUTO: 4.51 K/UL
WBC # BLD AUTO: 4.51 K/UL
WBC # BLD AUTO: 4.52 K/UL
WBC # BLD AUTO: 4.52 K/UL
WBC # BLD AUTO: 4.56 K/UL
WBC # BLD AUTO: 4.56 K/UL
WBC # BLD AUTO: 4.59 K/UL
WBC # BLD AUTO: 4.63 K/UL
WBC # BLD AUTO: 4.74 K/UL
WBC # BLD AUTO: 4.8 K/UL
WBC # BLD AUTO: 4.8 K/UL
WBC # BLD AUTO: 4.85 K/UL
WBC # BLD AUTO: 4.88 K/UL
WBC # BLD AUTO: 4.89 K/UL
WBC # BLD AUTO: 4.97 K/UL
WBC # BLD AUTO: 4.98 K/UL
WBC # BLD AUTO: 5.01 K/UL
WBC # BLD AUTO: 5.01 K/UL
WBC # BLD AUTO: 5.02 K/UL
WBC # BLD AUTO: 5.03 K/UL
WBC # BLD AUTO: 5.05 K/UL
WBC # BLD AUTO: 5.05 K/UL
WBC # BLD AUTO: 5.08 K/UL
WBC # BLD AUTO: 5.11 K/UL
WBC # BLD AUTO: 5.16 K/UL
WBC # BLD AUTO: 5.16 K/UL
WBC # BLD AUTO: 5.18 K/UL
WBC # BLD AUTO: 5.19 K/UL
WBC # BLD AUTO: 5.21 K/UL
WBC # BLD AUTO: 5.23 K/UL
WBC # BLD AUTO: 5.26 K/UL
WBC # BLD AUTO: 5.28 K/UL
WBC # BLD AUTO: 5.34 K/UL
WBC # BLD AUTO: 5.43 K/UL
WBC # BLD AUTO: 5.46 K/UL
WBC # BLD AUTO: 5.48 K/UL
WBC # BLD AUTO: 5.72 K/UL
WBC # BLD AUTO: 5.78 K/UL
WBC # BLD AUTO: 5.78 K/UL
WBC # BLD AUTO: 5.82 K/UL
WBC # BLD AUTO: 5.82 K/UL
WBC # BLD AUTO: 5.86 K/UL
WBC # BLD AUTO: 5.9 K/UL
WBC # BLD AUTO: 5.91 K/UL
WBC # BLD AUTO: 5.96 K/UL
WBC # BLD AUTO: 5.97 K/UL
WBC # BLD AUTO: 6.12 K/UL
WBC # BLD AUTO: 6.19 K/UL
WBC # BLD AUTO: 6.23 K/UL
WBC # BLD AUTO: 6.29 K/UL
WBC # BLD AUTO: 6.46 K/UL
WBC # BLD AUTO: 6.65 K/UL
WBC # BLD AUTO: 6.8 K/UL
WBC # BLD AUTO: 6.87 K/UL
WBC # BLD AUTO: 6.88 K/UL
WBC # BLD AUTO: 7.03 K/UL
WBC # BLD AUTO: 7.2 K/UL
WBC # BLD AUTO: 7.46 K/UL
WBC # BLD AUTO: 7.48 K/UL
WBC # BLD AUTO: 7.52 K/UL
WBC # BLD AUTO: 7.6 K/UL
WBC # BLD AUTO: 7.6 K/UL
WBC # BLD AUTO: 7.87 K/UL
WBC # BLD AUTO: 7.88 K/UL
WBC # BLD AUTO: 8.11 K/UL
WBC # BLD AUTO: 9.34 K/UL
WBC # BLD AUTO: 9.49 K/UL
WBC # FLD: 164 /CU MM
WBC # FLD: 167 /CU MM
WBC # FLD: 64 /CU MM
WBC #/AREA STL HPF: NORMAL /[HPF]
WBC #/AREA URNS AUTO: 6 /HPF (ref 0–5)

## 2018-01-01 PROCEDURE — 25000003 PHARM REV CODE 250: Performed by: INTERNAL MEDICINE

## 2018-01-01 PROCEDURE — 25000003 PHARM REV CODE 250: Performed by: STUDENT IN AN ORGANIZED HEALTH CARE EDUCATION/TRAINING PROGRAM

## 2018-01-01 PROCEDURE — 25000003 PHARM REV CODE 250: Performed by: NURSE PRACTITIONER

## 2018-01-01 PROCEDURE — G8979 MOBILITY GOAL STATUS: HCPCS | Mod: CK

## 2018-01-01 PROCEDURE — 63600175 PHARM REV CODE 636 W HCPCS: Performed by: INTERNAL MEDICINE

## 2018-01-01 PROCEDURE — 99900035 HC TECH TIME PER 15 MIN (STAT)

## 2018-01-01 PROCEDURE — 99233 SBSQ HOSP IP/OBS HIGH 50: CPT | Mod: ,,, | Performed by: INTERNAL MEDICINE

## 2018-01-01 PROCEDURE — 71046 X-RAY EXAM CHEST 2 VIEWS: CPT | Mod: 26,,, | Performed by: RADIOLOGY

## 2018-01-01 PROCEDURE — 83735 ASSAY OF MAGNESIUM: CPT | Mod: 91

## 2018-01-01 PROCEDURE — 83735 ASSAY OF MAGNESIUM: CPT

## 2018-01-01 PROCEDURE — 94761 N-INVAS EAR/PLS OXIMETRY MLT: CPT

## 2018-01-01 PROCEDURE — 80053 COMPREHEN METABOLIC PANEL: CPT

## 2018-01-01 PROCEDURE — 80197 ASSAY OF TACROLIMUS: CPT

## 2018-01-01 PROCEDURE — 97530 THERAPEUTIC ACTIVITIES: CPT

## 2018-01-01 PROCEDURE — 20000000 HC ICU ROOM

## 2018-01-01 PROCEDURE — 85610 PROTHROMBIN TIME: CPT | Mod: 91

## 2018-01-01 PROCEDURE — 63600175 PHARM REV CODE 636 W HCPCS: Performed by: STUDENT IN AN ORGANIZED HEALTH CARE EDUCATION/TRAINING PROGRAM

## 2018-01-01 PROCEDURE — 99223 1ST HOSP IP/OBS HIGH 75: CPT | Mod: ,,, | Performed by: INTERNAL MEDICINE

## 2018-01-01 PROCEDURE — 63600175 PHARM REV CODE 636 W HCPCS: Mod: JG | Performed by: NURSE PRACTITIONER

## 2018-01-01 PROCEDURE — 90935 HEMODIALYSIS ONE EVALUATION: CPT

## 2018-01-01 PROCEDURE — 99999 PR PBB SHADOW E&M-EST. PATIENT-LVL IV: CPT | Mod: PBBFAC,,, | Performed by: THORACIC SURGERY (CARDIOTHORACIC VASCULAR SURGERY)

## 2018-01-01 PROCEDURE — 87798 DETECT AGENT NOS DNA AMP: CPT | Mod: 59

## 2018-01-01 PROCEDURE — 86709 HEPATITIS A IGM ANTIBODY: CPT

## 2018-01-01 PROCEDURE — 87209 SMEAR COMPLEX STAIN: CPT

## 2018-01-01 PROCEDURE — A4216 STERILE WATER/SALINE, 10 ML: HCPCS | Performed by: INTERNAL MEDICINE

## 2018-01-01 PROCEDURE — 93922 UPR/L XTREMITY ART 2 LEVELS: CPT | Performed by: SURGERY

## 2018-01-01 PROCEDURE — 63600175 PHARM REV CODE 636 W HCPCS: Performed by: NURSE PRACTITIONER

## 2018-01-01 PROCEDURE — 90945 DIALYSIS ONE EVALUATION: CPT | Mod: ,,, | Performed by: INTERNAL MEDICINE

## 2018-01-01 PROCEDURE — 86850 RBC ANTIBODY SCREEN: CPT

## 2018-01-01 PROCEDURE — 25000003 PHARM REV CODE 250: Performed by: PHYSICIAN ASSISTANT

## 2018-01-01 PROCEDURE — 0DP6XUZ REMOVAL OF FEEDING DEVICE FROM STOMACH, EXTERNAL APPROACH: ICD-10-PCS | Performed by: SURGERY

## 2018-01-01 PROCEDURE — 27000221 HC OXYGEN, UP TO 24 HOURS

## 2018-01-01 PROCEDURE — 94640 AIRWAY INHALATION TREATMENT: CPT

## 2018-01-01 PROCEDURE — 86352 CELL FUNCTION ASSAY W/STIM: CPT

## 2018-01-01 PROCEDURE — 99292 CRITICAL CARE ADDL 30 MIN: CPT | Mod: ICN,,, | Performed by: INTERNAL MEDICINE

## 2018-01-01 PROCEDURE — 36430 TRANSFUSION BLD/BLD COMPNT: CPT

## 2018-01-01 PROCEDURE — 25000003 PHARM REV CODE 250: Performed by: ANESTHESIOLOGY

## 2018-01-01 PROCEDURE — 27000646 HC AEROBIKA DEVICE

## 2018-01-01 PROCEDURE — 96365 THER/PROPH/DIAG IV INF INIT: CPT

## 2018-01-01 PROCEDURE — 97110 THERAPEUTIC EXERCISES: CPT

## 2018-01-01 PROCEDURE — 02HV33Z INSERTION OF INFUSION DEVICE INTO SUPERIOR VENA CAVA, PERCUTANEOUS APPROACH: ICD-10-PCS | Performed by: INTERNAL MEDICINE

## 2018-01-01 PROCEDURE — 99232 SBSQ HOSP IP/OBS MODERATE 35: CPT | Mod: GC,,, | Performed by: INTERNAL MEDICINE

## 2018-01-01 PROCEDURE — 87493 C DIFF AMPLIFIED PROBE: CPT

## 2018-01-01 PROCEDURE — 87205 SMEAR GRAM STAIN: CPT | Mod: 59

## 2018-01-01 PROCEDURE — 82803 BLOOD GASES ANY COMBINATION: CPT

## 2018-01-01 PROCEDURE — 88305 TISSUE EXAM BY PATHOLOGIST: CPT | Performed by: PATHOLOGY

## 2018-01-01 PROCEDURE — 85007 BL SMEAR W/DIFF WBC COUNT: CPT

## 2018-01-01 PROCEDURE — 92526 ORAL FUNCTION THERAPY: CPT

## 2018-01-01 PROCEDURE — P9012 CRYOPRECIPITATE EACH UNIT: HCPCS

## 2018-01-01 PROCEDURE — P9035 PLATELET PHERES LEUKOREDUCED: HCPCS

## 2018-01-01 PROCEDURE — 63600175 PHARM REV CODE 636 W HCPCS: Performed by: THORACIC SURGERY (CARDIOTHORACIC VASCULAR SURGERY)

## 2018-01-01 PROCEDURE — 88185 FLOWCYTOMETRY/TC ADD-ON: CPT | Mod: 59 | Performed by: PATHOLOGY

## 2018-01-01 PROCEDURE — 37799 UNLISTED PX VASCULAR SURGERY: CPT

## 2018-01-01 PROCEDURE — 86900 BLOOD TYPING SEROLOGIC ABO: CPT

## 2018-01-01 PROCEDURE — 99291 CRITICAL CARE FIRST HOUR: CPT | Mod: ,,, | Performed by: INTERNAL MEDICINE

## 2018-01-01 PROCEDURE — 83605 ASSAY OF LACTIC ACID: CPT

## 2018-01-01 PROCEDURE — P9016 RBC LEUKOCYTES REDUCED: HCPCS

## 2018-01-01 PROCEDURE — 63600175 PHARM REV CODE 636 W HCPCS: Performed by: ANESTHESIOLOGY

## 2018-01-01 PROCEDURE — 84100 ASSAY OF PHOSPHORUS: CPT

## 2018-01-01 PROCEDURE — 81003 URINALYSIS AUTO W/O SCOPE: CPT

## 2018-01-01 PROCEDURE — 25000003 PHARM REV CODE 250: Performed by: EMERGENCY MEDICINE

## 2018-01-01 PROCEDURE — 99222 1ST HOSP IP/OBS MODERATE 55: CPT | Mod: GC,,, | Performed by: INTERNAL MEDICINE

## 2018-01-01 PROCEDURE — 96372 THER/PROPH/DIAG INJ SC/IM: CPT

## 2018-01-01 PROCEDURE — 84439 ASSAY OF FREE THYROXINE: CPT

## 2018-01-01 PROCEDURE — 99232 SBSQ HOSP IP/OBS MODERATE 35: CPT | Mod: ,,, | Performed by: INTERNAL MEDICINE

## 2018-01-01 PROCEDURE — 36415 COLL VENOUS BLD VENIPUNCTURE: CPT | Mod: PO

## 2018-01-01 PROCEDURE — 99900026 HC AIRWAY MAINTENANCE (STAT)

## 2018-01-01 PROCEDURE — 31720 CLEARANCE OF AIRWAYS: CPT

## 2018-01-01 PROCEDURE — S0028 INJECTION, FAMOTIDINE, 20 MG: HCPCS | Performed by: INTERNAL MEDICINE

## 2018-01-01 PROCEDURE — 87449 NOS EACH ORGANISM AG IA: CPT

## 2018-01-01 PROCEDURE — 90945 DIALYSIS ONE EVALUATION: CPT

## 2018-01-01 PROCEDURE — 94668 MNPJ CHEST WALL SBSQ: CPT

## 2018-01-01 PROCEDURE — 63600175 PHARM REV CODE 636 W HCPCS: Performed by: PHYSICIAN ASSISTANT

## 2018-01-01 PROCEDURE — 76937 US GUIDE VASCULAR ACCESS: CPT

## 2018-01-01 PROCEDURE — 71000033 HC RECOVERY, INTIAL HOUR: Performed by: THORACIC SURGERY (CARDIOTHORACIC VASCULAR SURGERY)

## 2018-01-01 PROCEDURE — 31622 DX BRONCHOSCOPE/WASH: CPT | Mod: ,,, | Performed by: INTERNAL MEDICINE

## 2018-01-01 PROCEDURE — 85025 COMPLETE CBC W/AUTO DIFF WBC: CPT

## 2018-01-01 PROCEDURE — 97535 SELF CARE MNGMENT TRAINING: CPT

## 2018-01-01 PROCEDURE — 82570 ASSAY OF URINE CREATININE: CPT

## 2018-01-01 PROCEDURE — 99233 SBSQ HOSP IP/OBS HIGH 50: CPT | Mod: GC,,, | Performed by: INTERNAL MEDICINE

## 2018-01-01 PROCEDURE — 87205 SMEAR GRAM STAIN: CPT

## 2018-01-01 PROCEDURE — 49083 ABD PARACENTESIS W/IMAGING: CPT

## 2018-01-01 PROCEDURE — 99999 PR PBB SHADOW E&M-EST. PATIENT-LVL V: CPT | Mod: PBBFAC,,, | Performed by: NURSE PRACTITIONER

## 2018-01-01 PROCEDURE — 92609 USE OF SPEECH DEVICE SERVICE: CPT

## 2018-01-01 PROCEDURE — 84145 PROCALCITONIN (PCT): CPT

## 2018-01-01 PROCEDURE — 36415 COLL VENOUS BLD VENIPUNCTURE: CPT

## 2018-01-01 PROCEDURE — 86920 COMPATIBILITY TEST SPIN: CPT

## 2018-01-01 PROCEDURE — 36620 INSERTION CATHETER ARTERY: CPT

## 2018-01-01 PROCEDURE — 3074F SYST BP LT 130 MM HG: CPT | Mod: S$GLB,,, | Performed by: NURSE PRACTITIONER

## 2018-01-01 PROCEDURE — 99223 1ST HOSP IP/OBS HIGH 75: CPT | Mod: ,,, | Performed by: PODIATRIST

## 2018-01-01 PROCEDURE — C9113 INJ PANTOPRAZOLE SODIUM, VIA: HCPCS | Performed by: NURSE PRACTITIONER

## 2018-01-01 PROCEDURE — 27200188 HC TRANSDUCER, ART ADULT/PEDS

## 2018-01-01 PROCEDURE — 94660 CPAP INITIATION&MGMT: CPT

## 2018-01-01 PROCEDURE — 87070 CULTURE OTHR SPECIMN AEROBIC: CPT

## 2018-01-01 PROCEDURE — 97112 NEUROMUSCULAR REEDUCATION: CPT

## 2018-01-01 PROCEDURE — 87045 FECES CULTURE AEROBIC BACT: CPT

## 2018-01-01 PROCEDURE — 20600001 HC STEP DOWN PRIVATE ROOM

## 2018-01-01 PROCEDURE — 82010 KETONE BODYS QUAN: CPT

## 2018-01-01 PROCEDURE — 27200966 HC CLOSED SUCTION SYSTEM

## 2018-01-01 PROCEDURE — 90935 HEMODIALYSIS ONE EVALUATION: CPT | Mod: ,,, | Performed by: NURSE PRACTITIONER

## 2018-01-01 PROCEDURE — 85027 COMPLETE CBC AUTOMATED: CPT

## 2018-01-01 PROCEDURE — D9220A PRA ANESTHESIA: Mod: CRNA,,, | Performed by: NURSE ANESTHETIST, CERTIFIED REGISTERED

## 2018-01-01 PROCEDURE — 85025 COMPLETE CBC W/AUTO DIFF WBC: CPT | Mod: 91

## 2018-01-01 PROCEDURE — 3008F BODY MASS INDEX DOCD: CPT | Mod: S$GLB,,, | Performed by: INTERNAL MEDICINE

## 2018-01-01 PROCEDURE — 25000242 PHARM REV CODE 250 ALT 637 W/ HCPCS: Performed by: INTERNAL MEDICINE

## 2018-01-01 PROCEDURE — 5A1945Z RESPIRATORY VENTILATION, 24-96 CONSECUTIVE HOURS: ICD-10-PCS | Performed by: INTERNAL MEDICINE

## 2018-01-01 PROCEDURE — 87206 SMEAR FLUORESCENT/ACID STAI: CPT

## 2018-01-01 PROCEDURE — P9047 ALBUMIN (HUMAN), 25%, 50ML: HCPCS | Mod: JG | Performed by: NURSE PRACTITIONER

## 2018-01-01 PROCEDURE — 84132 ASSAY OF SERUM POTASSIUM: CPT

## 2018-01-01 PROCEDURE — S0028 INJECTION, FAMOTIDINE, 20 MG: HCPCS | Performed by: NURSE PRACTITIONER

## 2018-01-01 PROCEDURE — 51702 INSERT TEMP BLADDER CATH: CPT

## 2018-01-01 PROCEDURE — 84443 ASSAY THYROID STIM HORMONE: CPT

## 2018-01-01 PROCEDURE — 87305 ASPERGILLUS AG IA: CPT

## 2018-01-01 PROCEDURE — 25000003 PHARM REV CODE 250: Performed by: HOSPITALIST

## 2018-01-01 PROCEDURE — 99232 SBSQ HOSP IP/OBS MODERATE 35: CPT | Mod: 25,,, | Performed by: INTERNAL MEDICINE

## 2018-01-01 PROCEDURE — 31500 INSERT EMERGENCY AIRWAY: CPT

## 2018-01-01 PROCEDURE — 86341 ISLET CELL ANTIBODY: CPT

## 2018-01-01 PROCEDURE — 94003 VENT MGMT INPAT SUBQ DAY: CPT

## 2018-01-01 PROCEDURE — 84134 ASSAY OF PREALBUMIN: CPT

## 2018-01-01 PROCEDURE — 99233 SBSQ HOSP IP/OBS HIGH 50: CPT | Mod: ,,, | Performed by: PODIATRIST

## 2018-01-01 PROCEDURE — 87075 CULTR BACTERIA EXCEPT BLOOD: CPT

## 2018-01-01 PROCEDURE — 87400 INFLUENZA A/B EACH AG IA: CPT

## 2018-01-01 PROCEDURE — 87252 VIRUS INOCULATION TISSUE: CPT

## 2018-01-01 PROCEDURE — 27201113

## 2018-01-01 PROCEDURE — 99233 SBSQ HOSP IP/OBS HIGH 50: CPT | Mod: 24,,, | Performed by: INTERNAL MEDICINE

## 2018-01-01 PROCEDURE — 85027 COMPLETE CBC AUTOMATED: CPT | Mod: 91

## 2018-01-01 PROCEDURE — 88312 SPECIAL STAINS GROUP 1: CPT | Performed by: PATHOLOGY

## 2018-01-01 PROCEDURE — 80048 BASIC METABOLIC PNL TOTAL CA: CPT | Mod: 91

## 2018-01-01 PROCEDURE — 94760 N-INVAS EAR/PLS OXIMETRY 1: CPT

## 2018-01-01 PROCEDURE — 63600175 PHARM REV CODE 636 W HCPCS

## 2018-01-01 PROCEDURE — 36569 INSJ PICC 5 YR+ W/O IMAGING: CPT

## 2018-01-01 PROCEDURE — 27100171 HC OXYGEN HIGH FLOW UP TO 24 HOURS

## 2018-01-01 PROCEDURE — 43830 GSTRST OPEN WO CONSTJ TUBE: CPT | Mod: ,,, | Performed by: SURGERY

## 2018-01-01 PROCEDURE — 85007 BL SMEAR W/DIFF WBC COUNT: CPT | Mod: 91

## 2018-01-01 PROCEDURE — 25000242 PHARM REV CODE 250 ALT 637 W/ HCPCS: Performed by: NURSE PRACTITIONER

## 2018-01-01 PROCEDURE — 71045 X-RAY EXAM CHEST 1 VIEW: CPT | Mod: TC

## 2018-01-01 PROCEDURE — 36000707: Performed by: THORACIC SURGERY (CARDIOTHORACIC VASCULAR SURGERY)

## 2018-01-01 PROCEDURE — 87102 FUNGUS ISOLATION CULTURE: CPT

## 2018-01-01 PROCEDURE — 0W993ZX DRAINAGE OF RIGHT PLEURAL CAVITY, PERCUTANEOUS APPROACH, DIAGNOSTIC: ICD-10-PCS | Performed by: RADIOLOGY

## 2018-01-01 PROCEDURE — 31622 DX BRONCHOSCOPE/WASH: CPT

## 2018-01-01 PROCEDURE — 85379 FIBRIN DEGRADATION QUANT: CPT

## 2018-01-01 PROCEDURE — 87116 MYCOBACTERIA CULTURE: CPT

## 2018-01-01 PROCEDURE — 99291 CRITICAL CARE FIRST HOUR: CPT | Mod: 25

## 2018-01-01 PROCEDURE — 85610 PROTHROMBIN TIME: CPT

## 2018-01-01 PROCEDURE — 97166 OT EVAL MOD COMPLEX 45 MIN: CPT

## 2018-01-01 PROCEDURE — 86480 TB TEST CELL IMMUN MEASURE: CPT

## 2018-01-01 PROCEDURE — 89055 LEUKOCYTE ASSESSMENT FECAL: CPT

## 2018-01-01 PROCEDURE — 99284 EMERGENCY DEPT VISIT MOD MDM: CPT | Mod: ,,, | Performed by: EMERGENCY MEDICINE

## 2018-01-01 PROCEDURE — 87040 BLOOD CULTURE FOR BACTERIA: CPT | Mod: 59

## 2018-01-01 PROCEDURE — 80100008 HC CRRT DAILY MAINTENANCE

## 2018-01-01 PROCEDURE — 0BP1XFZ REMOVAL OF TRACHEOSTOMY DEVICE FROM TRACHEA, EXTERNAL APPROACH: ICD-10-PCS | Performed by: SURGERY

## 2018-01-01 PROCEDURE — 99232 SBSQ HOSP IP/OBS MODERATE 35: CPT | Mod: ,,, | Performed by: NURSE PRACTITIONER

## 2018-01-01 PROCEDURE — 88312 SPECIAL STAINS GROUP 1: CPT | Mod: 26,,, | Performed by: PATHOLOGY

## 2018-01-01 PROCEDURE — 25000003 PHARM REV CODE 250: Performed by: NURSE ANESTHETIST, CERTIFIED REGISTERED

## 2018-01-01 PROCEDURE — 37000008 HC ANESTHESIA 1ST 15 MINUTES: Performed by: THORACIC SURGERY (CARDIOTHORACIC VASCULAR SURGERY)

## 2018-01-01 PROCEDURE — 80069 RENAL FUNCTION PANEL: CPT | Mod: 91

## 2018-01-01 PROCEDURE — 99222 1ST HOSP IP/OBS MODERATE 55: CPT | Mod: ,,, | Performed by: NURSE PRACTITIONER

## 2018-01-01 PROCEDURE — 80100014 HC HEMODIALYSIS 1:1

## 2018-01-01 PROCEDURE — 31600 PLANNED TRACHEOSTOMY: CPT | Mod: ,,, | Performed by: THORACIC SURGERY (CARDIOTHORACIC VASCULAR SURGERY)

## 2018-01-01 PROCEDURE — 85014 HEMATOCRIT: CPT

## 2018-01-01 PROCEDURE — 99223 1ST HOSP IP/OBS HIGH 75: CPT | Mod: GC,,, | Performed by: INTERNAL MEDICINE

## 2018-01-01 PROCEDURE — 99999 PR PBB SHADOW E&M-EST. PATIENT-LVL III: CPT | Mod: PBBFAC,,, | Performed by: THORACIC SURGERY (CARDIOTHORACIC VASCULAR SURGERY)

## 2018-01-01 PROCEDURE — C1751 CATH, INF, PER/CENT/MIDLINE: HCPCS

## 2018-01-01 PROCEDURE — 87385 HISTOPLASMA CAPSUL AG IA: CPT

## 2018-01-01 PROCEDURE — 83605 ASSAY OF LACTIC ACID: CPT | Mod: 91

## 2018-01-01 PROCEDURE — D9220A PRA ANESTHESIA: Mod: ,,, | Performed by: ANESTHESIOLOGY

## 2018-01-01 PROCEDURE — 80069 RENAL FUNCTION PANEL: CPT

## 2018-01-01 PROCEDURE — 99214 OFFICE O/P EST MOD 30 MIN: CPT | Mod: S$GLB,,, | Performed by: INTERNAL MEDICINE

## 2018-01-01 PROCEDURE — 99291 CRITICAL CARE FIRST HOUR: CPT | Mod: 25,,, | Performed by: INTERNAL MEDICINE

## 2018-01-01 PROCEDURE — C8929 TTE W OR WO FOL WCON,DOPPLER: HCPCS

## 2018-01-01 PROCEDURE — 0CJS8ZZ INSPECTION OF LARYNX, VIA NATURAL OR ARTIFICIAL OPENING ENDOSCOPIC: ICD-10-PCS | Performed by: OTOLARYNGOLOGY

## 2018-01-01 PROCEDURE — 99213 OFFICE O/P EST LOW 20 MIN: CPT | Mod: S$GLB,,, | Performed by: INTERNAL MEDICINE

## 2018-01-01 PROCEDURE — 87210 SMEAR WET MOUNT SALINE/INK: CPT

## 2018-01-01 PROCEDURE — 82272 OCCULT BLD FECES 1-3 TESTS: CPT

## 2018-01-01 PROCEDURE — 99999 PR PBB SHADOW E&M-EST. PATIENT-LVL III: CPT | Mod: PBBFAC,,, | Performed by: INTERNAL MEDICINE

## 2018-01-01 PROCEDURE — 97802 MEDICAL NUTRITION INDIV IN: CPT

## 2018-01-01 PROCEDURE — 99999 PR PBB SHADOW E&M-EST. PATIENT-LVL IV: CPT | Mod: PBBFAC,,, | Performed by: INTERNAL MEDICINE

## 2018-01-01 PROCEDURE — 86644 CMV ANTIBODY: CPT

## 2018-01-01 PROCEDURE — 99238 HOSP IP/OBS DSCHRG MGMT 30/<: CPT | Mod: ,,, | Performed by: INTERNAL MEDICINE

## 2018-01-01 PROCEDURE — 27000190 HC CPAP FULL FACE MASK W/VALVE

## 2018-01-01 PROCEDURE — 89051 BODY FLUID CELL COUNT: CPT

## 2018-01-01 PROCEDURE — 31575 DIAGNOSTIC LARYNGOSCOPY: CPT | Mod: GC,,, | Performed by: OTOLARYNGOLOGY

## 2018-01-01 PROCEDURE — 97803 MED NUTRITION INDIV SUBSEQ: CPT | Performed by: DIETITIAN, REGISTERED

## 2018-01-01 PROCEDURE — 36600 WITHDRAWAL OF ARTERIAL BLOOD: CPT

## 2018-01-01 PROCEDURE — 99024 POSTOP FOLLOW-UP VISIT: CPT | Mod: S$GLB,,, | Performed by: THORACIC SURGERY (CARDIOTHORACIC VASCULAR SURGERY)

## 2018-01-01 PROCEDURE — 32609 THORACOSCOPY W/BX PLEURA: CPT | Mod: ,,, | Performed by: THORACIC SURGERY (CARDIOTHORACIC VASCULAR SURGERY)

## 2018-01-01 PROCEDURE — 36556 INSERT NON-TUNNEL CV CATH: CPT | Mod: LT,,, | Performed by: SURGERY

## 2018-01-01 PROCEDURE — 86901 BLOOD TYPING SEROLOGIC RH(D): CPT

## 2018-01-01 PROCEDURE — 83036 HEMOGLOBIN GLYCOSYLATED A1C: CPT

## 2018-01-01 PROCEDURE — 88342 IMHCHEM/IMCYTCHM 1ST ANTB: CPT | Mod: 26,,, | Performed by: PATHOLOGY

## 2018-01-01 PROCEDURE — 5A1955Z RESPIRATORY VENTILATION, GREATER THAN 96 CONSECUTIVE HOURS: ICD-10-PCS | Performed by: INTERNAL MEDICINE

## 2018-01-01 PROCEDURE — 63600175 PHARM REV CODE 636 W HCPCS: Performed by: NURSE ANESTHETIST, CERTIFIED REGISTERED

## 2018-01-01 PROCEDURE — 87449 NOS EACH ORGANISM AG IA: CPT | Mod: 91

## 2018-01-01 PROCEDURE — G8988 SELF CARE GOAL STATUS: HCPCS | Mod: CJ

## 2018-01-01 PROCEDURE — 87632 RESP VIRUS 6-11 TARGETS: CPT

## 2018-01-01 PROCEDURE — 92523 SPEECH SOUND LANG COMPREHEN: CPT

## 2018-01-01 PROCEDURE — 83615 LACTATE (LD) (LDH) ENZYME: CPT

## 2018-01-01 PROCEDURE — 71045 X-RAY EXAM CHEST 1 VIEW: CPT | Mod: 26,,, | Performed by: RADIOLOGY

## 2018-01-01 PROCEDURE — 37000009 HC ANESTHESIA EA ADD 15 MINS: Performed by: THORACIC SURGERY (CARDIOTHORACIC VASCULAR SURGERY)

## 2018-01-01 PROCEDURE — 93306 TTE W/DOPPLER COMPLETE: CPT | Mod: 26,,, | Performed by: INTERNAL MEDICINE

## 2018-01-01 PROCEDURE — 96375 TX/PRO/DX INJ NEW DRUG ADDON: CPT

## 2018-01-01 PROCEDURE — 86833 HLA CLASS II HIGH DEFIN QUAL: CPT | Mod: 91,TXP

## 2018-01-01 PROCEDURE — 85384 FIBRINOGEN ACTIVITY: CPT | Mod: 91

## 2018-01-01 PROCEDURE — 88189 FLOWCYTOMETRY/READ 16 & >: CPT | Mod: ,,, | Performed by: PATHOLOGY

## 2018-01-01 PROCEDURE — 84484 ASSAY OF TROPONIN QUANT: CPT

## 2018-01-01 PROCEDURE — 36620 INSERTION CATHETER ARTERY: CPT | Mod: ,,, | Performed by: INTERNAL MEDICINE

## 2018-01-01 PROCEDURE — P9017 PLASMA 1 DONOR FRZ W/IN 8 HR: HCPCS

## 2018-01-01 PROCEDURE — 83880 ASSAY OF NATRIURETIC PEPTIDE: CPT

## 2018-01-01 PROCEDURE — 82533 TOTAL CORTISOL: CPT

## 2018-01-01 PROCEDURE — 97116 GAIT TRAINING THERAPY: CPT

## 2018-01-01 PROCEDURE — 92597 ORAL SPEECH DEVICE EVAL: CPT

## 2018-01-01 PROCEDURE — 93005 ELECTROCARDIOGRAM TRACING: CPT

## 2018-01-01 PROCEDURE — 87040 BLOOD CULTURE FOR BACTERIA: CPT

## 2018-01-01 PROCEDURE — 87046 STOOL CULTR AEROBIC BACT EA: CPT

## 2018-01-01 PROCEDURE — 80202 ASSAY OF VANCOMYCIN: CPT

## 2018-01-01 PROCEDURE — 0W9G30Z DRAINAGE OF PERITONEAL CAVITY WITH DRAINAGE DEVICE, PERCUTANEOUS APPROACH: ICD-10-PCS | Performed by: RADIOLOGY

## 2018-01-01 PROCEDURE — 80048 BASIC METABOLIC PNL TOTAL CA: CPT

## 2018-01-01 PROCEDURE — 86832 HLA CLASS I HIGH DEFIN QUAL: CPT | Mod: TXP

## 2018-01-01 PROCEDURE — 99223 1ST HOSP IP/OBS HIGH 75: CPT | Mod: GC,,, | Performed by: PSYCHIATRY & NEUROLOGY

## 2018-01-01 PROCEDURE — 87015 SPECIMEN INFECT AGNT CONCNTJ: CPT

## 2018-01-01 PROCEDURE — 43752 NASAL/OROGASTRIC W/TUBE PLMT: CPT

## 2018-01-01 PROCEDURE — 80053 COMPREHEN METABOLIC PANEL: CPT | Mod: 91

## 2018-01-01 PROCEDURE — 83010 ASSAY OF HAPTOGLOBIN QUANT: CPT

## 2018-01-01 PROCEDURE — 87425 ROTAVIRUS AG IA: CPT

## 2018-01-01 PROCEDURE — 77001 FLUOROGUIDE FOR VEIN DEVICE: CPT | Mod: 26,,, | Performed by: SURGERY

## 2018-01-01 PROCEDURE — 96366 THER/PROPH/DIAG IV INF ADDON: CPT

## 2018-01-01 PROCEDURE — 87329 GIARDIA AG IA: CPT

## 2018-01-01 PROCEDURE — D9220A PRA ANESTHESIA: Mod: ANES,,, | Performed by: ANESTHESIOLOGY

## 2018-01-01 PROCEDURE — G8987 SELF CARE CURRENT STATUS: HCPCS | Mod: CL

## 2018-01-01 PROCEDURE — 86022 PLATELET ANTIBODIES: CPT | Mod: 91

## 2018-01-01 PROCEDURE — 88189 PR  FLOWCYTOMETRY/READ, 16 & > MARKERS: ICD-10-PCS | Mod: ,,, | Performed by: PATHOLOGY

## 2018-01-01 PROCEDURE — 99214 OFFICE O/P EST MOD 30 MIN: CPT | Mod: S$GLB,,, | Performed by: NURSE PRACTITIONER

## 2018-01-01 PROCEDURE — 88112 CYTOPATH CELL ENHANCE TECH: CPT | Mod: 26,,, | Performed by: PATHOLOGY

## 2018-01-01 PROCEDURE — 85730 THROMBOPLASTIN TIME PARTIAL: CPT

## 2018-01-01 PROCEDURE — 90945 DIALYSIS ONE EVALUATION: CPT | Mod: GC,,, | Performed by: INTERNAL MEDICINE

## 2018-01-01 PROCEDURE — 99291 CRITICAL CARE FIRST HOUR: CPT | Mod: ,,, | Performed by: PHYSICIAN ASSISTANT

## 2018-01-01 PROCEDURE — 99231 SBSQ HOSP IP/OBS SF/LOW 25: CPT | Mod: GC,,, | Performed by: INTERNAL MEDICINE

## 2018-01-01 PROCEDURE — 90935 HEMODIALYSIS ONE EVALUATION: CPT | Mod: ,,, | Performed by: INTERNAL MEDICINE

## 2018-01-01 PROCEDURE — 86706 HEP B SURFACE ANTIBODY: CPT

## 2018-01-01 PROCEDURE — 94618 PULMONARY STRESS TESTING: CPT | Mod: S$GLB,,, | Performed by: INTERNAL MEDICINE

## 2018-01-01 PROCEDURE — 97165 OT EVAL LOW COMPLEX 30 MIN: CPT

## 2018-01-01 PROCEDURE — 99222 1ST HOSP IP/OBS MODERATE 55: CPT | Mod: ,,, | Performed by: INTERNAL MEDICINE

## 2018-01-01 PROCEDURE — 95816 EEG AWAKE AND DROWSY: CPT | Mod: 26,,, | Performed by: PSYCHIATRY & NEUROLOGY

## 2018-01-01 PROCEDURE — 94002 VENT MGMT INPAT INIT DAY: CPT

## 2018-01-01 PROCEDURE — 82550 ASSAY OF CK (CPK): CPT

## 2018-01-01 PROCEDURE — 25000003 PHARM REV CODE 250

## 2018-01-01 PROCEDURE — 99900022

## 2018-01-01 PROCEDURE — 87340 HEPATITIS B SURFACE AG IA: CPT

## 2018-01-01 PROCEDURE — 12000002 HC ACUTE/MED SURGE SEMI-PRIVATE ROOM

## 2018-01-01 PROCEDURE — 97803 MED NUTRITION INDIV SUBSEQ: CPT

## 2018-01-01 PROCEDURE — 32550 INSERT PLEURAL CATH: CPT | Mod: ,,, | Performed by: THORACIC SURGERY (CARDIOTHORACIC VASCULAR SURGERY)

## 2018-01-01 PROCEDURE — 85384 FIBRINOGEN ACTIVITY: CPT

## 2018-01-01 PROCEDURE — 83970 ASSAY OF PARATHORMONE: CPT

## 2018-01-01 PROCEDURE — 96360 HYDRATION IV INFUSION INIT: CPT

## 2018-01-01 PROCEDURE — C1752 CATH,HEMODIALYSIS,SHORT-TERM: HCPCS

## 2018-01-01 PROCEDURE — 83540 ASSAY OF IRON: CPT

## 2018-01-01 PROCEDURE — 94664 DEMO&/EVAL PT USE INHALER: CPT

## 2018-01-01 PROCEDURE — 82150 ASSAY OF AMYLASE: CPT

## 2018-01-01 PROCEDURE — 0B110F4 BYPASS TRACHEA TO CUTANEOUS WITH TRACHEOSTOMY DEVICE, OPEN APPROACH: ICD-10-PCS | Performed by: THORACIC SURGERY (CARDIOTHORACIC VASCULAR SURGERY)

## 2018-01-01 PROCEDURE — 97802 MEDICAL NUTRITION INDIV IN: CPT | Performed by: DIETITIAN, REGISTERED

## 2018-01-01 PROCEDURE — C9290 INJ, BUPIVACAINE LIPOSOME: HCPCS | Performed by: THORACIC SURGERY (CARDIOTHORACIC VASCULAR SURGERY)

## 2018-01-01 PROCEDURE — 95816 EEG AWAKE AND DROWSY: CPT

## 2018-01-01 PROCEDURE — 86704 HEP B CORE ANTIBODY TOTAL: CPT

## 2018-01-01 PROCEDURE — 99291 CRITICAL CARE FIRST HOUR: CPT | Mod: ,,, | Performed by: NURSE PRACTITIONER

## 2018-01-01 PROCEDURE — 92611 MOTION FLUOROSCOPY/SWALLOW: CPT

## 2018-01-01 PROCEDURE — 83690 ASSAY OF LIPASE: CPT

## 2018-01-01 PROCEDURE — 86833 HLA CLASS II HIGH DEFIN QUAL: CPT | Mod: TXP

## 2018-01-01 PROCEDURE — 82962 GLUCOSE BLOOD TEST: CPT | Performed by: THORACIC SURGERY (CARDIOTHORACIC VASCULAR SURGERY)

## 2018-01-01 PROCEDURE — 36556 INSERT NON-TUNNEL CV CATH: CPT

## 2018-01-01 PROCEDURE — 36000707: Performed by: SURGERY

## 2018-01-01 PROCEDURE — 85045 AUTOMATED RETICULOCYTE COUNT: CPT

## 2018-01-01 PROCEDURE — 99900025 HC BRONCHOSCOPY-ASST (STAT)

## 2018-01-01 PROCEDURE — 94799 UNLISTED PULMONARY SVC/PX: CPT

## 2018-01-01 PROCEDURE — 36000706: Performed by: SURGERY

## 2018-01-01 PROCEDURE — 99292 CRITICAL CARE ADDL 30 MIN: CPT | Mod: ,,, | Performed by: INTERNAL MEDICINE

## 2018-01-01 PROCEDURE — 82140 ASSAY OF AMMONIA: CPT

## 2018-01-01 PROCEDURE — 96361 HYDRATE IV INFUSION ADD-ON: CPT

## 2018-01-01 PROCEDURE — 63600175 PHARM REV CODE 636 W HCPCS: Performed by: SURGERY

## 2018-01-01 PROCEDURE — 99223 1ST HOSP IP/OBS HIGH 75: CPT | Mod: 25,GC,, | Performed by: OTOLARYNGOLOGY

## 2018-01-01 PROCEDURE — 27201040 HC RC 50 FILTER

## 2018-01-01 PROCEDURE — 86403 PARTICLE AGGLUT ANTBDY SCRN: CPT

## 2018-01-01 PROCEDURE — 99239 HOSP IP/OBS DSCHRG MGMT >30: CPT | Mod: ,,, | Performed by: INTERNAL MEDICINE

## 2018-01-01 PROCEDURE — 93010 ELECTROCARDIOGRAM REPORT: CPT | Mod: ,,, | Performed by: INTERNAL MEDICINE

## 2018-01-01 PROCEDURE — 37000009 HC ANESTHESIA EA ADD 15 MINS: Performed by: SURGERY

## 2018-01-01 PROCEDURE — 02HV33Z INSERTION OF INFUSION DEVICE INTO SUPERIOR VENA CAVA, PERCUTANEOUS APPROACH: ICD-10-PCS | Performed by: SURGERY

## 2018-01-01 PROCEDURE — 97163 PT EVAL HIGH COMPLEX 45 MIN: CPT

## 2018-01-01 PROCEDURE — 27100092 HC HIGH FLOW DELIVERY CANNULA

## 2018-01-01 PROCEDURE — 0B9F8ZX DRAINAGE OF RIGHT LOWER LUNG LOBE, VIA NATURAL OR ARTIFICIAL OPENING ENDOSCOPIC, DIAGNOSTIC: ICD-10-PCS | Performed by: THORACIC SURGERY (CARDIOTHORACIC VASCULAR SURGERY)

## 2018-01-01 PROCEDURE — 36000706: Performed by: THORACIC SURGERY (CARDIOTHORACIC VASCULAR SURGERY)

## 2018-01-01 PROCEDURE — 99214 OFFICE O/P EST MOD 30 MIN: CPT | Mod: 25,S$GLB,, | Performed by: INTERNAL MEDICINE

## 2018-01-01 PROCEDURE — 99285 EMERGENCY DEPT VISIT HI MDM: CPT | Mod: 25

## 2018-01-01 PROCEDURE — 71046 X-RAY EXAM CHEST 2 VIEWS: CPT | Mod: TC,FY

## 2018-01-01 PROCEDURE — 36000710: Performed by: THORACIC SURGERY (CARDIOTHORACIC VASCULAR SURGERY)

## 2018-01-01 PROCEDURE — 99291 CRITICAL CARE FIRST HOUR: CPT | Mod: 25,,, | Performed by: PHYSICIAN ASSISTANT

## 2018-01-01 PROCEDURE — A7048 VACUUM DRAIN BOTTLE/TUBE KIT: HCPCS

## 2018-01-01 PROCEDURE — 0B9F8ZX DRAINAGE OF RIGHT LOWER LUNG LOBE, VIA NATURAL OR ARTIFICIAL OPENING ENDOSCOPIC, DIAGNOSTIC: ICD-10-PCS | Performed by: INTERNAL MEDICINE

## 2018-01-01 PROCEDURE — 3078F DIAST BP <80 MM HG: CPT | Mod: S$GLB,,, | Performed by: NURSE PRACTITIONER

## 2018-01-01 PROCEDURE — P9021 RED BLOOD CELLS UNIT: HCPCS

## 2018-01-01 PROCEDURE — A4216 STERILE WATER/SALINE, 10 ML: HCPCS | Performed by: STUDENT IN AN ORGANIZED HEALTH CARE EDUCATION/TRAINING PROGRAM

## 2018-01-01 PROCEDURE — 87427 SHIGA-LIKE TOXIN AG IA: CPT

## 2018-01-01 PROCEDURE — 90792 PSYCH DIAG EVAL W/MED SRVCS: CPT | Mod: GC,,, | Performed by: PSYCHIATRY & NEUROLOGY

## 2018-01-01 PROCEDURE — 82962 GLUCOSE BLOOD TEST: CPT

## 2018-01-01 PROCEDURE — 85018 HEMOGLOBIN: CPT

## 2018-01-01 PROCEDURE — 88184 FLOWCYTOMETRY/ TC 1 MARKER: CPT | Performed by: PATHOLOGY

## 2018-01-01 PROCEDURE — S0030 INJECTION, METRONIDAZOLE: HCPCS | Performed by: INTERNAL MEDICINE

## 2018-01-01 PROCEDURE — 99233 SBSQ HOSP IP/OBS HIGH 50: CPT | Mod: ,,, | Performed by: NURSE PRACTITIONER

## 2018-01-01 PROCEDURE — 99285 EMERGENCY DEPT VISIT HI MDM: CPT | Mod: ,,, | Performed by: EMERGENCY MEDICINE

## 2018-01-01 PROCEDURE — L8501 TRACHEOSTOMY SPEAKING VALVE: HCPCS

## 2018-01-01 PROCEDURE — 86580 TB INTRADERMAL TEST: CPT | Performed by: NURSE PRACTITIONER

## 2018-01-01 PROCEDURE — 80061 LIPID PANEL: CPT

## 2018-01-01 PROCEDURE — 31500 INSERT EMERGENCY AIRWAY: CPT | Mod: ,,, | Performed by: ANESTHESIOLOGY

## 2018-01-01 PROCEDURE — 93306 TTE W/DOPPLER COMPLETE: CPT

## 2018-01-01 PROCEDURE — 84681 ASSAY OF C-PEPTIDE: CPT

## 2018-01-01 PROCEDURE — 86022 PLATELET ANTIBODIES: CPT

## 2018-01-01 PROCEDURE — 92507 TX SP LANG VOICE COMM INDIV: CPT

## 2018-01-01 PROCEDURE — 27800903 OPTIME MED/SURG SUP & DEVICES OTHER IMPLANTS: Performed by: THORACIC SURGERY (CARDIOTHORACIC VASCULAR SURGERY)

## 2018-01-01 PROCEDURE — 0B9H8ZX DRAINAGE OF LUNG LINGULA, VIA NATURAL OR ARTIFICIAL OPENING ENDOSCOPIC, DIAGNOSTIC: ICD-10-PCS | Performed by: INTERNAL MEDICINE

## 2018-01-01 PROCEDURE — 87070 CULTURE OTHR SPECIMN AEROBIC: CPT | Mod: 59

## 2018-01-01 PROCEDURE — C1750 CATH, HEMODIALYSIS,LONG-TERM: HCPCS | Performed by: SURGERY

## 2018-01-01 PROCEDURE — 87496 CYTOMEG DNA AMP PROBE: CPT

## 2018-01-01 PROCEDURE — 63600175 PHARM REV CODE 636 W HCPCS: Mod: JG | Performed by: INTERNAL MEDICINE

## 2018-01-01 PROCEDURE — 31624 DX BRONCHOSCOPE/LAVAGE: CPT | Mod: RT,,, | Performed by: INTERNAL MEDICINE

## 2018-01-01 PROCEDURE — 99232 SBSQ HOSP IP/OBS MODERATE 35: CPT | Mod: 24,,, | Performed by: INTERNAL MEDICINE

## 2018-01-01 PROCEDURE — 86920 COMPATIBILITY TEST SPIN: CPT | Mod: 59

## 2018-01-01 PROCEDURE — 86977 RBC SERUM PRETX INCUBJ/INHIB: CPT | Mod: 91,TXP

## 2018-01-01 PROCEDURE — 99223 1ST HOSP IP/OBS HIGH 75: CPT | Mod: 25,GC,, | Performed by: INTERNAL MEDICINE

## 2018-01-01 PROCEDURE — 99204 OFFICE O/P NEW MOD 45 MIN: CPT | Mod: S$GLB,,, | Performed by: THORACIC SURGERY (CARDIOTHORACIC VASCULAR SURGERY)

## 2018-01-01 PROCEDURE — 90945 DIALYSIS ONE EVALUATION: CPT | Mod: ,,, | Performed by: NURSE PRACTITIONER

## 2018-01-01 PROCEDURE — 83930 ASSAY OF BLOOD OSMOLALITY: CPT

## 2018-01-01 PROCEDURE — 31624 DX BRONCHOSCOPE/LAVAGE: CPT | Mod: 51,,, | Performed by: THORACIC SURGERY (CARDIOTHORACIC VASCULAR SURGERY)

## 2018-01-01 PROCEDURE — 82040 ASSAY OF SERUM ALBUMIN: CPT

## 2018-01-01 PROCEDURE — 36000711: Performed by: THORACIC SURGERY (CARDIOTHORACIC VASCULAR SURGERY)

## 2018-01-01 PROCEDURE — 84155 ASSAY OF PROTEIN SERUM: CPT

## 2018-01-01 PROCEDURE — 71000039 HC RECOVERY, EACH ADD'L HOUR: Performed by: THORACIC SURGERY (CARDIOTHORACIC VASCULAR SURGERY)

## 2018-01-01 PROCEDURE — 84436 ASSAY OF TOTAL THYROXINE: CPT

## 2018-01-01 PROCEDURE — G8978 MOBILITY CURRENT STATUS: HCPCS | Mod: CN

## 2018-01-01 PROCEDURE — 85048 AUTOMATED LEUKOCYTE COUNT: CPT

## 2018-01-01 PROCEDURE — 96376 TX/PRO/DX INJ SAME DRUG ADON: CPT

## 2018-01-01 PROCEDURE — 31624 DX BRONCHOSCOPE/LAVAGE: CPT

## 2018-01-01 PROCEDURE — 82306 VITAMIN D 25 HYDROXY: CPT

## 2018-01-01 PROCEDURE — 0DH60UZ INSERTION OF FEEDING DEVICE INTO STOMACH, OPEN APPROACH: ICD-10-PCS | Performed by: SURGERY

## 2018-01-01 PROCEDURE — 93307 TTE W/O DOPPLER COMPLETE: CPT

## 2018-01-01 PROCEDURE — 99223 1ST HOSP IP/OBS HIGH 75: CPT | Mod: ,,, | Performed by: THORACIC SURGERY (CARDIOTHORACIC VASCULAR SURGERY)

## 2018-01-01 PROCEDURE — 99285 EMERGENCY DEPT VISIT HI MDM: CPT | Mod: ,,, | Performed by: NURSE PRACTITIONER

## 2018-01-01 PROCEDURE — 88341 IMHCHEM/IMCYTCHM EA ADD ANTB: CPT | Performed by: PATHOLOGY

## 2018-01-01 PROCEDURE — 71046 X-RAY EXAM CHEST 2 VIEWS: CPT | Mod: TC

## 2018-01-01 PROCEDURE — 99223 1ST HOSP IP/OBS HIGH 75: CPT | Mod: ,,, | Performed by: NURSE PRACTITIONER

## 2018-01-01 PROCEDURE — 3008F BODY MASS INDEX DOCD: CPT | Mod: ,,, | Performed by: THORACIC SURGERY (CARDIOTHORACIC VASCULAR SURGERY)

## 2018-01-01 PROCEDURE — 87081 CULTURE SCREEN ONLY: CPT

## 2018-01-01 PROCEDURE — 80202 ASSAY OF VANCOMYCIN: CPT | Mod: 91

## 2018-01-01 PROCEDURE — 97162 PT EVAL MOD COMPLEX 30 MIN: CPT

## 2018-01-01 PROCEDURE — 90935 HEMODIALYSIS ONE EVALUATION: CPT | Mod: GC,,, | Performed by: INTERNAL MEDICINE

## 2018-01-01 PROCEDURE — 99213 OFFICE O/P EST LOW 20 MIN: CPT | Mod: ,,, | Performed by: THORACIC SURGERY (CARDIOTHORACIC VASCULAR SURGERY)

## 2018-01-01 PROCEDURE — 80076 HEPATIC FUNCTION PANEL: CPT

## 2018-01-01 PROCEDURE — 86832 HLA CLASS I HIGH DEFIN QUAL: CPT | Mod: 91,TXP

## 2018-01-01 PROCEDURE — 99291 CRITICAL CARE FIRST HOUR: CPT | Mod: ICN,,, | Performed by: INTERNAL MEDICINE

## 2018-01-01 PROCEDURE — 0BH17EZ INSERTION OF ENDOTRACHEAL AIRWAY INTO TRACHEA, VIA NATURAL OR ARTIFICIAL OPENING: ICD-10-PCS | Performed by: INTERNAL MEDICINE

## 2018-01-01 PROCEDURE — 37000008 HC ANESTHESIA 1ST 15 MINUTES: Performed by: SURGERY

## 2018-01-01 PROCEDURE — 81001 URINALYSIS AUTO W/SCOPE: CPT

## 2018-01-01 PROCEDURE — 99233 SBSQ HOSP IP/OBS HIGH 50: CPT | Mod: 25,,, | Performed by: INTERNAL MEDICINE

## 2018-01-01 PROCEDURE — 86880 COOMBS TEST DIRECT: CPT

## 2018-01-01 PROCEDURE — 86635 COCCIDIOIDES ANTIBODY: CPT

## 2018-01-01 PROCEDURE — 88305 TISSUE EXAM BY PATHOLOGIST: CPT | Mod: 26,,, | Performed by: PATHOLOGY

## 2018-01-01 PROCEDURE — 92610 EVALUATE SWALLOWING FUNCTION: CPT

## 2018-01-01 PROCEDURE — 93307 TTE W/O DOPPLER COMPLETE: CPT | Mod: 26,,, | Performed by: INTERNAL MEDICINE

## 2018-01-01 PROCEDURE — 86682 HELMINTH ANTIBODY: CPT

## 2018-01-01 PROCEDURE — 82728 ASSAY OF FERRITIN: CPT

## 2018-01-01 PROCEDURE — 99233 SBSQ HOSP IP/OBS HIGH 50: CPT | Mod: GC,,, | Performed by: PSYCHIATRY & NEUROLOGY

## 2018-01-01 PROCEDURE — 82947 ASSAY GLUCOSE BLOOD QUANT: CPT

## 2018-01-01 PROCEDURE — 86580 TB INTRADERMAL TEST: CPT | Performed by: INTERNAL MEDICINE

## 2018-01-01 PROCEDURE — 87507 IADNA-DNA/RNA PROBE TQ 12-25: CPT

## 2018-01-01 PROCEDURE — 82330 ASSAY OF CALCIUM: CPT

## 2018-01-01 DEVICE — KIT CATH HEMOSPLIT 14FR 23CM: Type: IMPLANTABLE DEVICE | Site: CHEST | Status: FUNCTIONAL

## 2018-01-01 DEVICE — TRAY TRACH BLUE RHINO 8: Type: IMPLANTABLE DEVICE | Site: THROAT | Status: FUNCTIONAL

## 2018-01-01 RX ORDER — INSULIN ASPART 100 [IU]/ML
0-4 INJECTION, SOLUTION INTRAVENOUS; SUBCUTANEOUS EVERY 4 HOURS PRN
Status: DISCONTINUED | OUTPATIENT
Start: 2018-01-01 | End: 2018-01-01

## 2018-01-01 RX ORDER — ALBUMIN HUMAN 250 G/1000ML
25 SOLUTION INTRAVENOUS ONCE
Status: DISCONTINUED | OUTPATIENT
Start: 2018-01-01 | End: 2018-01-01

## 2018-01-01 RX ORDER — VANCOMYCIN/0.9 % SOD CHLORIDE 1 G/100 ML
1000 PLASTIC BAG, INJECTION (ML) INTRAVENOUS ONCE
Status: COMPLETED | OUTPATIENT
Start: 2018-01-01 | End: 2018-01-01

## 2018-01-01 RX ORDER — TACROLIMUS 1 MG/1
CAPSULE ORAL
Qty: 60 CAPSULE | Refills: 0 | Status: SHIPPED | OUTPATIENT
Start: 2018-01-01 | End: 2018-01-01

## 2018-01-01 RX ORDER — TACROLIMUS 1 MG/1
1 CAPSULE ORAL EVERY MORNING
Status: DISCONTINUED | OUTPATIENT
Start: 2018-01-01 | End: 2018-01-01

## 2018-01-01 RX ORDER — VANCOMYCIN/0.9 % SOD CHLORIDE 1 G/100 ML
1000 PLASTIC BAG, INJECTION (ML) INTRAVENOUS ONCE
Status: CANCELLED | OUTPATIENT
Start: 2018-01-01 | End: 2018-01-01

## 2018-01-01 RX ORDER — ESCITALOPRAM OXALATE 10 MG/1
20 TABLET ORAL DAILY
Status: DISCONTINUED | OUTPATIENT
Start: 2018-01-01 | End: 2018-01-01 | Stop reason: HOSPADM

## 2018-01-01 RX ORDER — CODEINE SULFATE 30 MG/1
30 TABLET ORAL ONCE
Status: COMPLETED | OUTPATIENT
Start: 2018-01-01 | End: 2018-01-01

## 2018-01-01 RX ORDER — TACROLIMUS 1 MG/1
CAPSULE ORAL
Status: COMPLETED
Start: 2018-01-01 | End: 2018-01-01

## 2018-01-01 RX ORDER — INSULIN ASPART 100 [IU]/ML
0-5 INJECTION, SOLUTION INTRAVENOUS; SUBCUTANEOUS
Status: DISCONTINUED | OUTPATIENT
Start: 2018-01-01 | End: 2018-01-01

## 2018-01-01 RX ORDER — GUAIFENESIN 600 MG/1
600 TABLET, EXTENDED RELEASE ORAL ONCE
Status: COMPLETED | OUTPATIENT
Start: 2018-01-01 | End: 2018-01-01

## 2018-01-01 RX ORDER — POLYETHYLENE GLYCOL 3350, SODIUM SULFATE ANHYDROUS, SODIUM BICARBONATE, SODIUM CHLORIDE, POTASSIUM CHLORIDE 236; 22.74; 6.74; 5.86; 2.97 G/4L; G/4L; G/4L; G/4L; G/4L
4000 POWDER, FOR SOLUTION ORAL ONCE
Status: COMPLETED | OUTPATIENT
Start: 2018-01-01 | End: 2018-01-01

## 2018-01-01 RX ORDER — ESCITALOPRAM OXALATE 20 MG/1
20 TABLET ORAL DAILY
Status: DISCONTINUED | OUTPATIENT
Start: 2018-01-01 | End: 2018-01-01 | Stop reason: HOSPADM

## 2018-01-01 RX ORDER — INSULIN ASPART 100 [IU]/ML
1 INJECTION, SOLUTION INTRAVENOUS; SUBCUTANEOUS
Status: DISCONTINUED | OUTPATIENT
Start: 2018-01-01 | End: 2018-01-01

## 2018-01-01 RX ORDER — PROCHLORPERAZINE EDISYLATE 5 MG/ML
10 INJECTION INTRAMUSCULAR; INTRAVENOUS ONCE
Status: COMPLETED | OUTPATIENT
Start: 2018-01-01 | End: 2018-01-01

## 2018-01-01 RX ORDER — DIPHENHYDRAMINE HCL 25 MG
25 CAPSULE ORAL EVERY 4 HOURS PRN
Status: DISCONTINUED | OUTPATIENT
Start: 2018-01-01 | End: 2018-01-01 | Stop reason: HOSPADM

## 2018-01-01 RX ORDER — ESCITALOPRAM OXALATE 10 MG/1
20 TABLET ORAL DAILY
Status: DISCONTINUED | OUTPATIENT
Start: 2018-01-01 | End: 2018-01-01

## 2018-01-01 RX ORDER — GLUCAGON 1 MG
1 KIT INJECTION
Status: DISCONTINUED | OUTPATIENT
Start: 2018-01-01 | End: 2018-01-01 | Stop reason: HOSPADM

## 2018-01-01 RX ORDER — PROCHLORPERAZINE EDISYLATE 5 MG/ML
10 INJECTION INTRAMUSCULAR; INTRAVENOUS ONCE
Status: DISCONTINUED | OUTPATIENT
Start: 2018-01-01 | End: 2018-01-01

## 2018-01-01 RX ORDER — ACETAMINOPHEN 325 MG/1
650 TABLET ORAL EVERY 6 HOURS PRN
Status: DISCONTINUED | OUTPATIENT
Start: 2018-01-01 | End: 2018-01-01 | Stop reason: HOSPADM

## 2018-01-01 RX ORDER — HYDROCODONE BITARTRATE AND ACETAMINOPHEN 500; 5 MG/1; MG/1
TABLET ORAL CONTINUOUS
Status: DISCONTINUED | OUTPATIENT
Start: 2018-01-01 | End: 2018-01-01 | Stop reason: ALTCHOICE

## 2018-01-01 RX ORDER — TACROLIMUS 1 MG/1
2 CAPSULE ORAL EVERY MORNING
Status: DISCONTINUED | OUTPATIENT
Start: 2018-01-01 | End: 2018-01-01

## 2018-01-01 RX ORDER — FENTANYL CITRATE 50 UG/ML
INJECTION, SOLUTION INTRAMUSCULAR; INTRAVENOUS
Status: DISCONTINUED | OUTPATIENT
Start: 2018-01-01 | End: 2018-01-01

## 2018-01-01 RX ORDER — HEPARIN SODIUM 1000 [USP'U]/ML
INJECTION, SOLUTION INTRAVENOUS; SUBCUTANEOUS
Status: DISCONTINUED | OUTPATIENT
Start: 2018-01-01 | End: 2018-01-01 | Stop reason: HOSPADM

## 2018-01-01 RX ORDER — TACROLIMUS 0.5 MG/1
0.5 CAPSULE ORAL 2 TIMES DAILY
Status: DISCONTINUED | OUTPATIENT
Start: 2018-01-01 | End: 2018-01-01

## 2018-01-01 RX ORDER — NEOSTIGMINE METHYLSULFATE 1 MG/ML
INJECTION, SOLUTION INTRAVENOUS
Status: DISCONTINUED | OUTPATIENT
Start: 2018-01-01 | End: 2018-01-01

## 2018-01-01 RX ORDER — TACROLIMUS 1 MG/1
CAPSULE ORAL
Qty: 60 CAPSULE | Refills: 0 | Status: ON HOLD | OUTPATIENT
Start: 2018-01-01 | End: 2018-01-01

## 2018-01-01 RX ORDER — POTASSIUM CHLORIDE 20 MEQ/15ML
60 SOLUTION ORAL
Status: DISCONTINUED | OUTPATIENT
Start: 2018-01-01 | End: 2018-01-01

## 2018-01-01 RX ORDER — NORTRIPTYLINE HYDROCHLORIDE 10 MG/1
10 CAPSULE ORAL NIGHTLY
Status: DISCONTINUED | OUTPATIENT
Start: 2018-01-01 | End: 2018-01-01

## 2018-01-01 RX ORDER — MAGNESIUM SULFATE/D5W 2 G/50 ML
2 INTRAVENOUS SOLUTION, PIGGYBACK (ML) INTRAVENOUS ONCE
Status: COMPLETED | OUTPATIENT
Start: 2018-01-01 | End: 2018-01-01

## 2018-01-01 RX ORDER — PROMETHAZINE HYDROCHLORIDE 25 MG/1
25 TABLET ORAL EVERY 6 HOURS PRN
Status: DISCONTINUED | OUTPATIENT
Start: 2018-01-01 | End: 2018-01-01 | Stop reason: HOSPADM

## 2018-01-01 RX ORDER — FENTANYL CITRATE-0.9 % NACL/PF 10 MCG/ML
PLASTIC BAG, INJECTION (ML) INTRAVENOUS CONTINUOUS
Status: DISCONTINUED | OUTPATIENT
Start: 2018-01-01 | End: 2018-01-01

## 2018-01-01 RX ORDER — OXYCODONE HCL 5 MG/5 ML
10 SOLUTION, ORAL ORAL EVERY 4 HOURS PRN
Status: DISCONTINUED | OUTPATIENT
Start: 2018-01-01 | End: 2018-01-01

## 2018-01-01 RX ORDER — FLUTICASONE PROPIONATE 50 MCG
2 SPRAY, SUSPENSION (ML) NASAL DAILY
Status: DISCONTINUED | OUTPATIENT
Start: 2018-01-01 | End: 2018-01-01 | Stop reason: HOSPADM

## 2018-01-01 RX ORDER — PREDNISONE 10 MG/1
10 TABLET ORAL DAILY
Status: DISCONTINUED | OUTPATIENT
Start: 2018-01-01 | End: 2018-01-01

## 2018-01-01 RX ORDER — HYDROCODONE BITARTRATE AND ACETAMINOPHEN 500; 5 MG/1; MG/1
TABLET ORAL
Status: DISCONTINUED | OUTPATIENT
Start: 2018-01-01 | End: 2018-01-01

## 2018-01-01 RX ORDER — SODIUM CHLORIDE 9 MG/ML
INJECTION, SOLUTION INTRAVENOUS
Status: DISCONTINUED | OUTPATIENT
Start: 2018-01-01 | End: 2018-01-01

## 2018-01-01 RX ORDER — PRAVASTATIN SODIUM 40 MG/1
40 TABLET ORAL DAILY
Status: DISCONTINUED | OUTPATIENT
Start: 2018-01-01 | End: 2018-01-01

## 2018-01-01 RX ORDER — NOREPINEPHRINE BITARTRATE/D5W 4MG/250ML
PLASTIC BAG, INJECTION (ML) INTRAVENOUS
Status: COMPLETED
Start: 2018-01-01 | End: 2018-01-01

## 2018-01-01 RX ORDER — ASPIRIN 81 MG/1
81 TABLET ORAL DAILY
Status: DISCONTINUED | OUTPATIENT
Start: 2018-01-01 | End: 2018-01-01 | Stop reason: HOSPADM

## 2018-01-01 RX ORDER — MOXIFLOXACIN HYDROCHLORIDE 400 MG/1
400 TABLET ORAL DAILY
Qty: 5 TABLET | Refills: 0 | Status: SHIPPED | OUTPATIENT
Start: 2018-01-01 | End: 2018-01-01

## 2018-01-01 RX ORDER — ONDANSETRON 8 MG/1
8 TABLET, ORALLY DISINTEGRATING ORAL EVERY 8 HOURS PRN
Status: DISCONTINUED | OUTPATIENT
Start: 2018-01-01 | End: 2018-01-01

## 2018-01-01 RX ORDER — PRAVASTATIN SODIUM 20 MG/1
40 TABLET ORAL DAILY
Status: DISCONTINUED | OUTPATIENT
Start: 2018-01-01 | End: 2018-01-01 | Stop reason: HOSPADM

## 2018-01-01 RX ORDER — ENOXAPARIN SODIUM 100 MG/ML
30 INJECTION SUBCUTANEOUS EVERY 24 HOURS
Status: DISCONTINUED | OUTPATIENT
Start: 2018-01-01 | End: 2018-01-01

## 2018-01-01 RX ORDER — PREDNISONE 5 MG/1
5 TABLET ORAL DAILY
Status: DISCONTINUED | OUTPATIENT
Start: 2018-01-01 | End: 2018-01-01

## 2018-01-01 RX ORDER — POTASSIUM CHLORIDE 14.9 MG/ML
20 INJECTION INTRAVENOUS ONCE
Status: COMPLETED | OUTPATIENT
Start: 2018-01-01 | End: 2018-01-01

## 2018-01-01 RX ORDER — CHLORHEXIDINE GLUCONATE ORAL RINSE 1.2 MG/ML
15 SOLUTION DENTAL 2 TIMES DAILY
Status: DISCONTINUED | OUTPATIENT
Start: 2018-01-01 | End: 2018-01-01

## 2018-01-01 RX ORDER — CEFEPIME HYDROCHLORIDE 1 G/1
1 INJECTION, POWDER, FOR SOLUTION INTRAMUSCULAR; INTRAVENOUS
Status: COMPLETED | OUTPATIENT
Start: 2018-01-01 | End: 2018-01-01

## 2018-01-01 RX ORDER — QUETIAPINE FUMARATE 25 MG/1
25 TABLET, FILM COATED ORAL ONCE
Status: COMPLETED | OUTPATIENT
Start: 2018-01-01 | End: 2018-01-01

## 2018-01-01 RX ORDER — SODIUM CHLORIDE 9 MG/ML
INJECTION, SOLUTION INTRAVENOUS ONCE
Status: COMPLETED | OUTPATIENT
Start: 2018-01-01 | End: 2018-01-01

## 2018-01-01 RX ORDER — MAGNESIUM SULFATE/D5W 2 G/50 ML
2 INTRAVENOUS SOLUTION, PIGGYBACK (ML) INTRAVENOUS
Status: DISCONTINUED | OUTPATIENT
Start: 2018-01-01 | End: 2018-01-01 | Stop reason: ALTCHOICE

## 2018-01-01 RX ORDER — ACETAMINOPHEN 650 MG/20.3ML
1000 LIQUID ORAL 3 TIMES DAILY
Status: DISCONTINUED | OUTPATIENT
Start: 2018-01-01 | End: 2018-01-01

## 2018-01-01 RX ORDER — BENZONATATE 100 MG/1
100 CAPSULE ORAL 3 TIMES DAILY PRN
Status: DISCONTINUED | OUTPATIENT
Start: 2018-01-01 | End: 2018-01-01

## 2018-01-01 RX ORDER — TAMSULOSIN HYDROCHLORIDE 0.4 MG/1
0.4 CAPSULE ORAL DAILY
Status: DISCONTINUED | OUTPATIENT
Start: 2018-01-01 | End: 2018-01-01 | Stop reason: HOSPADM

## 2018-01-01 RX ORDER — LANOLIN ALCOHOL/MO/W.PET/CERES
400 CREAM (GRAM) TOPICAL DAILY
Qty: 30 TABLET | Refills: 11
Start: 2018-01-01

## 2018-01-01 RX ORDER — IBUPROFEN 200 MG
24 TABLET ORAL
Status: DISCONTINUED | OUTPATIENT
Start: 2018-01-01 | End: 2018-01-01

## 2018-01-01 RX ORDER — CALCIUM CARBONATE 200(500)MG
500 TABLET,CHEWABLE ORAL 2 TIMES DAILY PRN
Status: DISCONTINUED | OUTPATIENT
Start: 2018-01-01 | End: 2018-01-01 | Stop reason: HOSPADM

## 2018-01-01 RX ORDER — IPRATROPIUM BROMIDE AND ALBUTEROL SULFATE 2.5; .5 MG/3ML; MG/3ML
3 SOLUTION RESPIRATORY (INHALATION) EVERY 4 HOURS
Status: DISCONTINUED | OUTPATIENT
Start: 2018-01-01 | End: 2018-01-01 | Stop reason: HOSPADM

## 2018-01-01 RX ORDER — LACTULOSE 10 G/15ML
200 SOLUTION ORAL; RECTAL 3 TIMES DAILY
Status: DISCONTINUED | OUTPATIENT
Start: 2018-01-01 | End: 2018-01-01

## 2018-01-01 RX ORDER — HYDROCODONE BITARTRATE AND ACETAMINOPHEN 500; 5 MG/1; MG/1
TABLET ORAL CONTINUOUS
Status: DISCONTINUED | OUTPATIENT
Start: 2018-01-01 | End: 2018-01-01

## 2018-01-01 RX ORDER — SODIUM CHLORIDE 9 MG/ML
INJECTION, SOLUTION INTRAVENOUS ONCE
Status: DISCONTINUED | OUTPATIENT
Start: 2018-01-01 | End: 2018-01-01

## 2018-01-01 RX ORDER — CETIRIZINE HYDROCHLORIDE 5 MG/1
5 TABLET ORAL DAILY
Status: DISCONTINUED | OUTPATIENT
Start: 2018-01-01 | End: 2018-01-01 | Stop reason: HOSPADM

## 2018-01-01 RX ORDER — SODIUM CHLORIDE 0.9 % (FLUSH) 0.9 %
3 SYRINGE (ML) INJECTION EVERY 8 HOURS
Status: DISCONTINUED | OUTPATIENT
Start: 2018-01-01 | End: 2018-01-01 | Stop reason: HOSPADM

## 2018-01-01 RX ORDER — INSULIN ASPART 100 [IU]/ML
1-10 INJECTION, SOLUTION INTRAVENOUS; SUBCUTANEOUS
Status: DISCONTINUED | OUTPATIENT
Start: 2018-01-01 | End: 2018-01-01 | Stop reason: HOSPADM

## 2018-01-01 RX ORDER — PRAVASTATIN SODIUM 40 MG/1
40 TABLET ORAL NIGHTLY
Status: DISCONTINUED | OUTPATIENT
Start: 2018-01-01 | End: 2018-01-01 | Stop reason: HOSPADM

## 2018-01-01 RX ORDER — GABAPENTIN 300 MG/1
900 CAPSULE ORAL 3 TIMES DAILY
Qty: 90 CAPSULE | Refills: 1
Start: 2018-01-01

## 2018-01-01 RX ORDER — TAMSULOSIN HYDROCHLORIDE 0.4 MG/1
0.4 CAPSULE ORAL NIGHTLY
Status: DISCONTINUED | OUTPATIENT
Start: 2018-01-01 | End: 2018-01-01

## 2018-01-01 RX ORDER — ASPIRIN 81 MG/1
81 TABLET ORAL DAILY
Status: DISCONTINUED | OUTPATIENT
Start: 2018-01-01 | End: 2018-01-01

## 2018-01-01 RX ORDER — NOREPINEPHRINE BITARTRATE/D5W 4MG/250ML
0.02 PLASTIC BAG, INJECTION (ML) INTRAVENOUS CONTINUOUS
Status: DISCONTINUED | OUTPATIENT
Start: 2018-01-01 | End: 2018-01-01

## 2018-01-01 RX ORDER — MORPHINE SULFATE 4 MG/ML
2 INJECTION, SOLUTION INTRAMUSCULAR; INTRAVENOUS ONCE
Status: DISCONTINUED | OUTPATIENT
Start: 2018-01-01 | End: 2018-01-01 | Stop reason: HOSPADM

## 2018-01-01 RX ORDER — RIBAVIRIN 200 MG/1
600 CAPSULE ORAL 2 TIMES DAILY
Status: DISCONTINUED | OUTPATIENT
Start: 2018-01-01 | End: 2018-01-01

## 2018-01-01 RX ORDER — CEFEPIME HYDROCHLORIDE 2 G/1
2 INJECTION, POWDER, FOR SOLUTION INTRAVENOUS
Status: COMPLETED | OUTPATIENT
Start: 2018-01-01 | End: 2018-01-01

## 2018-01-01 RX ORDER — CETIRIZINE HYDROCHLORIDE 5 MG/1
5 TABLET ORAL DAILY
Status: DISCONTINUED | OUTPATIENT
Start: 2018-01-01 | End: 2018-01-01

## 2018-01-01 RX ORDER — TAMSULOSIN HYDROCHLORIDE 0.4 MG/1
CAPSULE ORAL
Qty: 60 CAPSULE | Refills: 11
Start: 2018-01-01 | End: 2018-01-01

## 2018-01-01 RX ORDER — IBUPROFEN 200 MG
24 TABLET ORAL
Status: DISCONTINUED | OUTPATIENT
Start: 2018-01-01 | End: 2018-01-01 | Stop reason: HOSPADM

## 2018-01-01 RX ORDER — CEFAZOLIN SODIUM 1 G/3ML
INJECTION, POWDER, FOR SOLUTION INTRAMUSCULAR; INTRAVENOUS
Status: DISCONTINUED | OUTPATIENT
Start: 2018-01-01 | End: 2018-01-01

## 2018-01-01 RX ORDER — LEVOTHYROXINE SODIUM ANHYDROUS 100 UG/5ML
88 INJECTION, POWDER, LYOPHILIZED, FOR SOLUTION INTRAVENOUS DAILY
Status: DISCONTINUED | OUTPATIENT
Start: 2018-01-01 | End: 2018-01-01

## 2018-01-01 RX ORDER — ACETAMINOPHEN 10 MG/ML
INJECTION, SOLUTION INTRAVENOUS
Status: DISCONTINUED | OUTPATIENT
Start: 2018-01-01 | End: 2018-01-01

## 2018-01-01 RX ORDER — INSULIN ASPART 100 [IU]/ML
2 INJECTION, SOLUTION INTRAVENOUS; SUBCUTANEOUS
Status: DISCONTINUED | OUTPATIENT
Start: 2018-01-01 | End: 2018-01-01

## 2018-01-01 RX ORDER — HYDROCODONE BITARTRATE AND ACETAMINOPHEN 500; 5 MG/1; MG/1
TABLET ORAL CONTINUOUS
Status: ACTIVE | OUTPATIENT
Start: 2018-01-01 | End: 2018-01-01

## 2018-01-01 RX ORDER — ONDANSETRON 2 MG/ML
8 INJECTION INTRAMUSCULAR; INTRAVENOUS
Status: COMPLETED | OUTPATIENT
Start: 2018-01-01 | End: 2018-01-01

## 2018-01-01 RX ORDER — ONDANSETRON 8 MG/1
8 TABLET, ORALLY DISINTEGRATING ORAL EVERY 8 HOURS PRN
Status: DISCONTINUED | OUTPATIENT
Start: 2018-01-01 | End: 2018-01-01 | Stop reason: HOSPADM

## 2018-01-01 RX ORDER — GLUCAGON 1 MG
1 KIT INJECTION
Status: DISCONTINUED | OUTPATIENT
Start: 2018-01-01 | End: 2018-01-01

## 2018-01-01 RX ORDER — LEVOTHYROXINE SODIUM 25 UG/1
TABLET ORAL
Status: DISCONTINUED
Start: 2018-01-01 | End: 2018-01-01 | Stop reason: WASHOUT

## 2018-01-01 RX ORDER — LEVOTHYROXINE SODIUM 137 UG/1
137 TABLET ORAL
Status: DISCONTINUED | OUTPATIENT
Start: 2018-01-01 | End: 2018-01-01 | Stop reason: HOSPADM

## 2018-01-01 RX ORDER — SODIUM CHLORIDE 9 MG/ML
INJECTION, SOLUTION INTRAVENOUS
Status: DISCONTINUED | OUTPATIENT
Start: 2018-01-01 | End: 2018-01-01 | Stop reason: HOSPADM

## 2018-01-01 RX ORDER — FENTANYL CITRATE 50 UG/ML
25 INJECTION, SOLUTION INTRAMUSCULAR; INTRAVENOUS
Status: DISCONTINUED | OUTPATIENT
Start: 2018-01-01 | End: 2018-01-01

## 2018-01-01 RX ORDER — PANTOPRAZOLE SODIUM 40 MG/1
40 TABLET, DELAYED RELEASE ORAL DAILY
Status: DISCONTINUED | OUTPATIENT
Start: 2018-01-01 | End: 2018-01-01

## 2018-01-01 RX ORDER — PREDNISONE 2.5 MG/1
2.5 TABLET ORAL DAILY
Status: DISCONTINUED | OUTPATIENT
Start: 2018-01-01 | End: 2018-01-01 | Stop reason: HOSPADM

## 2018-01-01 RX ORDER — HEPARIN SODIUM 5000 [USP'U]/ML
5000 INJECTION, SOLUTION INTRAVENOUS; SUBCUTANEOUS EVERY 8 HOURS
Status: DISCONTINUED | OUTPATIENT
Start: 2018-01-01 | End: 2018-01-01 | Stop reason: HOSPADM

## 2018-01-01 RX ORDER — FENTANYL CITRATE 50 UG/ML
100 INJECTION, SOLUTION INTRAMUSCULAR; INTRAVENOUS
Status: DISCONTINUED | OUTPATIENT
Start: 2018-01-01 | End: 2018-01-01

## 2018-01-01 RX ORDER — INSULIN ASPART 100 [IU]/ML
4-8 INJECTION, SOLUTION INTRAVENOUS; SUBCUTANEOUS
Status: DISCONTINUED | OUTPATIENT
Start: 2018-01-01 | End: 2018-01-01

## 2018-01-01 RX ORDER — PROPOFOL 10 MG/ML
VIAL (ML) INTRAVENOUS
Status: DISCONTINUED | OUTPATIENT
Start: 2018-01-01 | End: 2018-01-01

## 2018-01-01 RX ORDER — PHENYLEPHRINE HCL IN 0.9% NACL 1 MG/10 ML
SYRINGE (ML) INTRAVENOUS
Status: COMPLETED
Start: 2018-01-01 | End: 2018-01-01

## 2018-01-01 RX ORDER — ALPRAZOLAM 0.25 MG/1
0.25 TABLET ORAL EVERY 8 HOURS PRN
Status: DISCONTINUED | OUTPATIENT
Start: 2018-01-01 | End: 2018-01-01

## 2018-01-01 RX ORDER — IBUPROFEN 200 MG
16 TABLET ORAL
Status: DISCONTINUED | OUTPATIENT
Start: 2018-01-01 | End: 2018-01-01

## 2018-01-01 RX ORDER — TACROLIMUS 1 MG/1
4 CAPSULE ORAL 2 TIMES DAILY
Status: DISCONTINUED | OUTPATIENT
Start: 2018-01-01 | End: 2018-01-01

## 2018-01-01 RX ORDER — SODIUM CHLORIDE 9 MG/ML
INJECTION, SOLUTION INTRAVENOUS CONTINUOUS
Status: DISCONTINUED | OUTPATIENT
Start: 2018-01-01 | End: 2018-01-01

## 2018-01-01 RX ORDER — OXYCODONE HYDROCHLORIDE 5 MG/1
10 TABLET ORAL EVERY 4 HOURS PRN
Status: DISCONTINUED | OUTPATIENT
Start: 2018-01-01 | End: 2018-01-01 | Stop reason: HOSPADM

## 2018-01-01 RX ORDER — LEVALBUTEROL INHALATION SOLUTION 0.63 MG/3ML
0.63 SOLUTION RESPIRATORY (INHALATION) EVERY 4 HOURS
Status: DISCONTINUED | OUTPATIENT
Start: 2018-01-01 | End: 2018-01-01

## 2018-01-01 RX ORDER — PREDNISONE 2.5 MG/1
2.5 TABLET ORAL DAILY
Status: DISCONTINUED | OUTPATIENT
Start: 2018-01-01 | End: 2018-01-01

## 2018-01-01 RX ORDER — BENZONATATE 100 MG/1
100 CAPSULE ORAL 3 TIMES DAILY PRN
Status: DISCONTINUED | OUTPATIENT
Start: 2018-01-01 | End: 2018-01-01 | Stop reason: HOSPADM

## 2018-01-01 RX ORDER — ALPRAZOLAM 0.5 MG/1
0.5 TABLET ORAL EVERY 6 HOURS PRN
Qty: 10 TABLET | Refills: 0
Start: 2018-01-01

## 2018-01-01 RX ORDER — DRONABINOL 2.5 MG/1
2.5 CAPSULE ORAL 2 TIMES DAILY
Status: DISCONTINUED | OUTPATIENT
Start: 2018-01-01 | End: 2018-01-01 | Stop reason: HOSPADM

## 2018-01-01 RX ORDER — INSULIN ASPART 100 [IU]/ML
3 INJECTION, SOLUTION INTRAVENOUS; SUBCUTANEOUS
Status: DISCONTINUED | OUTPATIENT
Start: 2018-01-01 | End: 2018-01-01

## 2018-01-01 RX ORDER — HEPARIN SODIUM 5000 [USP'U]/ML
5000 INJECTION, SOLUTION INTRAVENOUS; SUBCUTANEOUS EVERY 12 HOURS
Status: DISCONTINUED | OUTPATIENT
Start: 2018-01-01 | End: 2018-01-01 | Stop reason: HOSPADM

## 2018-01-01 RX ORDER — ASPIRIN 81 MG/1
TABLET ORAL
Status: COMPLETED
Start: 2018-01-01 | End: 2018-01-01

## 2018-01-01 RX ORDER — GABAPENTIN 300 MG/1
900 CAPSULE ORAL 3 TIMES DAILY
Status: DISCONTINUED | OUTPATIENT
Start: 2018-01-01 | End: 2018-01-01 | Stop reason: HOSPADM

## 2018-01-01 RX ORDER — MYCOPHENOLATE MOFETIL 500 MG/1
1000 TABLET ORAL 2 TIMES DAILY
Qty: 120 TABLET | Refills: 11 | Status: SHIPPED | OUTPATIENT
Start: 2018-01-01 | End: 2018-01-01 | Stop reason: SINTOL

## 2018-01-01 RX ORDER — LINEZOLID 2 MG/ML
600 INJECTION, SOLUTION INTRAVENOUS
Status: COMPLETED | OUTPATIENT
Start: 2018-01-01 | End: 2018-01-01

## 2018-01-01 RX ORDER — TACROLIMUS 1 MG/1
2 CAPSULE ORAL 2 TIMES DAILY
Status: DISCONTINUED | OUTPATIENT
Start: 2018-01-01 | End: 2018-01-01

## 2018-01-01 RX ORDER — MIDODRINE HYDROCHLORIDE 5 MG/1
15 TABLET ORAL
Status: DISCONTINUED | OUTPATIENT
Start: 2018-01-01 | End: 2018-01-01 | Stop reason: HOSPADM

## 2018-01-01 RX ORDER — HYDROCODONE BITARTRATE AND ACETAMINOPHEN 500; 5 MG/1; MG/1
TABLET ORAL CONTINUOUS
Status: DISCONTINUED | OUTPATIENT
Start: 2018-01-01 | End: 2018-01-01 | Stop reason: HOSPADM

## 2018-01-01 RX ORDER — OXYCODONE HCL 5 MG/5 ML
10 SOLUTION, ORAL ORAL ONCE
Status: DISCONTINUED | OUTPATIENT
Start: 2018-01-01 | End: 2018-01-01

## 2018-01-01 RX ORDER — OXYCODONE HCL 5 MG/5 ML
10 SOLUTION, ORAL ORAL EVERY 12 HOURS
Status: DISCONTINUED | OUTPATIENT
Start: 2018-01-01 | End: 2018-01-01

## 2018-01-01 RX ORDER — FAMOTIDINE 20 MG/1
20 TABLET, FILM COATED ORAL NIGHTLY
Status: DISCONTINUED | OUTPATIENT
Start: 2018-01-01 | End: 2018-01-01 | Stop reason: HOSPADM

## 2018-01-01 RX ORDER — ALPRAZOLAM 0.5 MG/1
1 TABLET, ORALLY DISINTEGRATING ORAL 4 TIMES DAILY PRN
Status: DISCONTINUED | OUTPATIENT
Start: 2018-01-01 | End: 2018-01-01

## 2018-01-01 RX ORDER — ACETAMINOPHEN 325 MG/1
650 TABLET ORAL EVERY 4 HOURS PRN
Status: DISCONTINUED | OUTPATIENT
Start: 2018-01-01 | End: 2018-01-01 | Stop reason: HOSPADM

## 2018-01-01 RX ORDER — LANOLIN ALCOHOL/MO/W.PET/CERES
400 CREAM (GRAM) TOPICAL DAILY
Status: DISCONTINUED | OUTPATIENT
Start: 2018-01-01 | End: 2018-01-01 | Stop reason: HOSPADM

## 2018-01-01 RX ORDER — CEFEPIME HYDROCHLORIDE 1 G/1
1 INJECTION, POWDER, FOR SOLUTION INTRAMUSCULAR; INTRAVENOUS
Status: DISCONTINUED | OUTPATIENT
Start: 2018-01-01 | End: 2018-01-01 | Stop reason: HOSPADM

## 2018-01-01 RX ORDER — LEVALBUTEROL INHALATION SOLUTION 0.63 MG/3ML
0.63 SOLUTION RESPIRATORY (INHALATION)
Qty: 4 BOX | Refills: 0
Start: 2018-01-01 | End: 2019-05-18

## 2018-01-01 RX ORDER — DRONABINOL 2.5 MG/1
2.5 CAPSULE ORAL 2 TIMES DAILY
Qty: 30 CAPSULE | Refills: 0
Start: 2018-01-01

## 2018-01-01 RX ORDER — MIDODRINE HYDROCHLORIDE 10 MG/1
10 TABLET ORAL
Qty: 90 TABLET | Refills: 11
Start: 2018-01-01 | End: 2019-05-18

## 2018-01-01 RX ORDER — MIDODRINE HYDROCHLORIDE 5 MG/1
10 TABLET ORAL
Status: DISCONTINUED | OUTPATIENT
Start: 2018-01-01 | End: 2018-01-01

## 2018-01-01 RX ORDER — QUETIAPINE FUMARATE 25 MG/1
50 TABLET, FILM COATED ORAL NIGHTLY
Status: DISCONTINUED | OUTPATIENT
Start: 2018-01-01 | End: 2018-01-01 | Stop reason: HOSPADM

## 2018-01-01 RX ORDER — POLYETHYLENE GLYCOL 3350 17 G/17G
POWDER, FOR SOLUTION ORAL
Status: COMPLETED
Start: 2018-01-01 | End: 2018-01-01

## 2018-01-01 RX ORDER — TAMSULOSIN HYDROCHLORIDE 0.4 MG/1
CAPSULE ORAL
Qty: 60 CAPSULE | Refills: 11 | Status: ON HOLD | OUTPATIENT
Start: 2018-01-01 | End: 2018-01-01

## 2018-01-01 RX ORDER — POTASSIUM CHLORIDE 20 MEQ/1
20 TABLET, EXTENDED RELEASE ORAL ONCE
Status: COMPLETED | OUTPATIENT
Start: 2018-01-01 | End: 2018-01-01

## 2018-01-01 RX ORDER — GABAPENTIN 300 MG/1
300 CAPSULE ORAL 3 TIMES DAILY
Status: DISCONTINUED | OUTPATIENT
Start: 2018-01-01 | End: 2018-01-01

## 2018-01-01 RX ORDER — SODIUM,POTASSIUM PHOSPHATES 280-250MG
2 POWDER IN PACKET (EA) ORAL EVERY 4 HOURS
Status: DISPENSED | OUTPATIENT
Start: 2018-01-01 | End: 2018-01-01

## 2018-01-01 RX ORDER — OXYCODONE HYDROCHLORIDE 5 MG/1
5 TABLET ORAL EVERY 4 HOURS PRN
Status: DISCONTINUED | OUTPATIENT
Start: 2018-01-01 | End: 2018-01-01 | Stop reason: HOSPADM

## 2018-01-01 RX ORDER — POLYETHYLENE GLYCOL 3350 17 G/17G
17 POWDER, FOR SOLUTION ORAL DAILY
Status: DISCONTINUED | OUTPATIENT
Start: 2018-01-01 | End: 2018-01-01

## 2018-01-01 RX ORDER — IBUPROFEN 200 MG
16 TABLET ORAL
Status: DISCONTINUED | OUTPATIENT
Start: 2018-01-01 | End: 2018-01-01 | Stop reason: HOSPADM

## 2018-01-01 RX ORDER — INSULIN ASPART 100 [IU]/ML
0-5 INJECTION, SOLUTION INTRAVENOUS; SUBCUTANEOUS
Status: DISCONTINUED | OUTPATIENT
Start: 2018-01-01 | End: 2018-01-01 | Stop reason: HOSPADM

## 2018-01-01 RX ORDER — MAGNESIUM SULFATE HEPTAHYDRATE 40 MG/ML
2 INJECTION, SOLUTION INTRAVENOUS
Status: DISPENSED | OUTPATIENT
Start: 2018-01-01 | End: 2018-01-01

## 2018-01-01 RX ORDER — LEVALBUTEROL INHALATION SOLUTION 0.63 MG/3ML
0.63 SOLUTION RESPIRATORY (INHALATION)
Status: DISCONTINUED | OUTPATIENT
Start: 2018-01-01 | End: 2018-01-01

## 2018-01-01 RX ORDER — POTASSIUM CHLORIDE 20 MEQ/1
40 TABLET, EXTENDED RELEASE ORAL ONCE
Status: COMPLETED | OUTPATIENT
Start: 2018-01-01 | End: 2018-01-01

## 2018-01-01 RX ORDER — GLYCOPYRROLATE 0.2 MG/ML
INJECTION INTRAMUSCULAR; INTRAVENOUS
Status: DISCONTINUED | OUTPATIENT
Start: 2018-01-01 | End: 2018-01-01

## 2018-01-01 RX ORDER — PANTOPRAZOLE SODIUM 40 MG/1
TABLET, DELAYED RELEASE ORAL
Status: COMPLETED
Start: 2018-01-01 | End: 2018-01-01

## 2018-01-01 RX ORDER — FENTANYL CITRATE 50 UG/ML
50 INJECTION, SOLUTION INTRAMUSCULAR; INTRAVENOUS ONCE
Status: COMPLETED | OUTPATIENT
Start: 2018-01-01 | End: 2018-01-01

## 2018-01-01 RX ORDER — PROMETHAZINE HYDROCHLORIDE AND CODEINE PHOSPHATE 6.25; 1 MG/5ML; MG/5ML
5 SOLUTION ORAL EVERY 4 HOURS PRN
Status: DISCONTINUED | OUTPATIENT
Start: 2018-01-01 | End: 2018-01-01

## 2018-01-01 RX ORDER — PREDNISONE 2.5 MG/1
5 TABLET ORAL DAILY
Status: DISCONTINUED | OUTPATIENT
Start: 2018-01-01 | End: 2018-01-01 | Stop reason: HOSPADM

## 2018-01-01 RX ORDER — ETOMIDATE 2 MG/ML
INJECTION INTRAVENOUS
Status: DISCONTINUED
Start: 2018-01-01 | End: 2018-01-01 | Stop reason: WASHOUT

## 2018-01-01 RX ORDER — PRAVASTATIN SODIUM 40 MG/1
40 TABLET ORAL DAILY
Qty: 30 TABLET | Refills: 0
Start: 2018-01-01

## 2018-01-01 RX ORDER — ALBUMIN HUMAN 250 G/1000ML
25 SOLUTION INTRAVENOUS ONCE
Status: COMPLETED | OUTPATIENT
Start: 2018-01-01 | End: 2018-01-01

## 2018-01-01 RX ORDER — TORSEMIDE 20 MG/1
40 TABLET ORAL DAILY
Status: DISCONTINUED | OUTPATIENT
Start: 2018-01-01 | End: 2018-01-01 | Stop reason: HOSPADM

## 2018-01-01 RX ORDER — LANOLIN ALCOHOL/MO/W.PET/CERES
400 CREAM (GRAM) TOPICAL ONCE
Status: COMPLETED | OUTPATIENT
Start: 2018-01-01 | End: 2018-01-01

## 2018-01-01 RX ORDER — NORTRIPTYLINE HYDROCHLORIDE 25 MG/1
25 CAPSULE ORAL NIGHTLY
Status: DISCONTINUED | OUTPATIENT
Start: 2018-01-01 | End: 2018-01-01 | Stop reason: HOSPADM

## 2018-01-01 RX ORDER — TORSEMIDE 20 MG/1
40 TABLET ORAL DAILY
Status: DISCONTINUED | OUTPATIENT
Start: 2018-01-01 | End: 2018-01-01

## 2018-01-01 RX ORDER — PHENYLEPHRINE HYDROCHLORIDE 10 MG/ML
INJECTION INTRAVENOUS
Status: DISCONTINUED | OUTPATIENT
Start: 2018-01-01 | End: 2018-01-01

## 2018-01-01 RX ORDER — POLYETHYLENE GLYCOL 3350 17 G/17G
17 POWDER, FOR SOLUTION ORAL 2 TIMES DAILY
Status: DISCONTINUED | OUTPATIENT
Start: 2018-01-01 | End: 2018-01-01 | Stop reason: HOSPADM

## 2018-01-01 RX ORDER — TACROLIMUS 5 MG/1
5 CAPSULE ORAL 2 TIMES DAILY
Status: DISCONTINUED | OUTPATIENT
Start: 2018-01-01 | End: 2018-01-01

## 2018-01-01 RX ORDER — PROPOFOL 10 MG/ML
INJECTION, EMULSION INTRAVENOUS
Status: DISPENSED
Start: 2018-01-01 | End: 2018-01-01

## 2018-01-01 RX ORDER — MIDODRINE HYDROCHLORIDE 5 MG/1
10 TABLET ORAL
Status: DISCONTINUED | OUTPATIENT
Start: 2018-01-01 | End: 2018-01-01 | Stop reason: HOSPADM

## 2018-01-01 RX ORDER — ROCURONIUM BROMIDE 10 MG/ML
INJECTION, SOLUTION INTRAVENOUS
Status: COMPLETED
Start: 2018-01-01 | End: 2018-01-01

## 2018-01-01 RX ORDER — ONDANSETRON 8 MG/1
8 TABLET, ORALLY DISINTEGRATING ORAL EVERY 6 HOURS PRN
Status: DISCONTINUED | OUTPATIENT
Start: 2018-01-01 | End: 2018-01-01

## 2018-01-01 RX ORDER — LORAZEPAM 2 MG/ML
1 INJECTION INTRAMUSCULAR EVERY 6 HOURS PRN
Status: DISCONTINUED | OUTPATIENT
Start: 2018-01-01 | End: 2018-01-01

## 2018-01-01 RX ORDER — ALPRAZOLAM 0.5 MG/1
0.5 TABLET ORAL EVERY 6 HOURS PRN
Status: DISCONTINUED | OUTPATIENT
Start: 2018-01-01 | End: 2018-01-01 | Stop reason: HOSPADM

## 2018-01-01 RX ORDER — TACROLIMUS 0.5 MG/1
0.5 CAPSULE ORAL EVERY MORNING
Status: DISCONTINUED | OUTPATIENT
Start: 2018-01-01 | End: 2018-01-01

## 2018-01-01 RX ORDER — LACTULOSE 10 G/15ML
20 SOLUTION ORAL ONCE
Status: COMPLETED | OUTPATIENT
Start: 2018-01-01 | End: 2018-01-01

## 2018-01-01 RX ORDER — HEPARIN SODIUM 1000 [USP'U]/ML
1000 INJECTION, SOLUTION INTRAVENOUS; SUBCUTANEOUS
Status: DISCONTINUED | OUTPATIENT
Start: 2018-01-01 | End: 2018-01-01 | Stop reason: HOSPADM

## 2018-01-01 RX ORDER — MORPHINE SULFATE ORAL SOLUTION 10 MG/5ML
5 SOLUTION ORAL
Status: DISCONTINUED | OUTPATIENT
Start: 2018-01-01 | End: 2018-01-01 | Stop reason: HOSPADM

## 2018-01-01 RX ORDER — LEVOTHYROXINE SODIUM ANHYDROUS 100 UG/5ML
125 INJECTION, POWDER, LYOPHILIZED, FOR SOLUTION INTRAVENOUS DAILY
Status: DISCONTINUED | OUTPATIENT
Start: 2018-01-01 | End: 2018-01-01

## 2018-01-01 RX ORDER — MAGNESIUM SULFATE HEPTAHYDRATE 40 MG/ML
2 INJECTION, SOLUTION INTRAVENOUS ONCE
Status: DISCONTINUED | OUTPATIENT
Start: 2018-01-01 | End: 2018-01-01

## 2018-01-01 RX ORDER — METOCLOPRAMIDE HYDROCHLORIDE 5 MG/ML
10 INJECTION INTRAMUSCULAR; INTRAVENOUS EVERY 10 MIN PRN
Status: DISCONTINUED | OUTPATIENT
Start: 2018-01-01 | End: 2018-01-01 | Stop reason: HOSPADM

## 2018-01-01 RX ORDER — CALCIUM CARBONATE 200(500)MG
1000 TABLET,CHEWABLE ORAL ONCE
Status: COMPLETED | OUTPATIENT
Start: 2018-01-01 | End: 2018-01-01

## 2018-01-01 RX ORDER — AZITHROMYCIN 250 MG/1
250 TABLET, FILM COATED ORAL
Status: DISCONTINUED | OUTPATIENT
Start: 2018-01-01 | End: 2018-01-01

## 2018-01-01 RX ORDER — MIDAZOLAM HYDROCHLORIDE 1 MG/ML
INJECTION, SOLUTION INTRAMUSCULAR; INTRAVENOUS
Status: DISCONTINUED | OUTPATIENT
Start: 2018-01-01 | End: 2018-01-01

## 2018-01-01 RX ORDER — TAMSULOSIN HYDROCHLORIDE 0.4 MG/1
0.8 CAPSULE ORAL NIGHTLY
Status: DISCONTINUED | OUTPATIENT
Start: 2018-01-01 | End: 2018-01-01 | Stop reason: HOSPADM

## 2018-01-01 RX ORDER — LEVALBUTEROL INHALATION SOLUTION 0.63 MG/3ML
0.63 SOLUTION RESPIRATORY (INHALATION) EVERY 4 HOURS PRN
Status: DISCONTINUED | OUTPATIENT
Start: 2018-01-01 | End: 2018-01-01

## 2018-01-01 RX ORDER — TACROLIMUS 1 MG/1
3 CAPSULE ORAL 2 TIMES DAILY
Status: DISCONTINUED | OUTPATIENT
Start: 2018-01-01 | End: 2018-01-01

## 2018-01-01 RX ORDER — SODIUM,POTASSIUM PHOSPHATES 280-250MG
2 POWDER IN PACKET (EA) ORAL
Status: DISCONTINUED | OUTPATIENT
Start: 2018-01-01 | End: 2018-01-01

## 2018-01-01 RX ORDER — LIDOCAINE HYDROCHLORIDE 10 MG/ML
20 INJECTION INFILTRATION; PERINEURAL ONCE
Status: COMPLETED | OUTPATIENT
Start: 2018-01-01 | End: 2018-01-01

## 2018-01-01 RX ORDER — PANTOPRAZOLE SODIUM 40 MG/1
40 TABLET, DELAYED RELEASE ORAL DAILY
Status: DISCONTINUED | OUTPATIENT
Start: 2018-01-01 | End: 2018-01-01 | Stop reason: HOSPADM

## 2018-01-01 RX ORDER — GABAPENTIN 100 MG/1
100 CAPSULE ORAL 3 TIMES DAILY
Status: DISCONTINUED | OUTPATIENT
Start: 2018-01-01 | End: 2018-01-01 | Stop reason: HOSPADM

## 2018-01-01 RX ORDER — METRONIDAZOLE 500 MG/100ML
500 INJECTION, SOLUTION INTRAVENOUS
Status: DISCONTINUED | OUTPATIENT
Start: 2018-01-01 | End: 2018-01-01

## 2018-01-01 RX ORDER — HYDROMORPHONE HYDROCHLORIDE 2 MG/ML
0.2 INJECTION, SOLUTION INTRAMUSCULAR; INTRAVENOUS; SUBCUTANEOUS EVERY 5 MIN PRN
Status: DISCONTINUED | OUTPATIENT
Start: 2018-01-01 | End: 2018-01-01 | Stop reason: HOSPADM

## 2018-01-01 RX ORDER — PREDNISONE 2.5 MG/1
2.5 TABLET ORAL DAILY
Qty: 30 TABLET | Refills: 11 | Status: SHIPPED | OUTPATIENT
Start: 2018-01-01 | End: 2018-01-01 | Stop reason: SDUPTHER

## 2018-01-01 RX ORDER — ALPRAZOLAM 0.5 MG/1
0.5 TABLET ORAL EVERY 6 HOURS PRN
Status: DISCONTINUED | OUTPATIENT
Start: 2018-01-01 | End: 2018-01-01

## 2018-01-01 RX ORDER — INSULIN ASPART 100 [IU]/ML
8 INJECTION, SOLUTION INTRAVENOUS; SUBCUTANEOUS
Status: DISCONTINUED | OUTPATIENT
Start: 2018-01-01 | End: 2018-01-01

## 2018-01-01 RX ORDER — MOXIFLOXACIN HYDROCHLORIDE 400 MG/1
400 TABLET ORAL DAILY
Status: DISCONTINUED | OUTPATIENT
Start: 2018-01-01 | End: 2018-01-01 | Stop reason: HOSPADM

## 2018-01-01 RX ORDER — LEVOTHYROXINE SODIUM 75 UG/1
150 TABLET ORAL EVERY MORNING
Status: DISCONTINUED | OUTPATIENT
Start: 2018-01-01 | End: 2018-01-01

## 2018-01-01 RX ORDER — MORPHINE SULFATE 20 MG/ML
5 SOLUTION ORAL
Status: DISCONTINUED | OUTPATIENT
Start: 2018-01-01 | End: 2018-01-01

## 2018-01-01 RX ORDER — FAMOTIDINE 20 MG/1
20 TABLET, FILM COATED ORAL NIGHTLY
Status: DISCONTINUED | OUTPATIENT
Start: 2018-01-01 | End: 2018-01-01

## 2018-01-01 RX ORDER — TACROLIMUS 1 MG/1
4 CAPSULE ORAL EVERY 12 HOURS
Qty: 240 CAPSULE | Refills: 11 | Status: SHIPPED | OUTPATIENT
Start: 2018-01-01 | End: 2019-06-11

## 2018-01-01 RX ORDER — FAMOTIDINE 20 MG/1
20 TABLET, FILM COATED ORAL NIGHTLY
Qty: 30 TABLET | Refills: 11 | Status: CANCELLED
Start: 2018-01-01 | End: 2019-05-18

## 2018-01-01 RX ORDER — MYCOPHENOLIC ACID 180 MG/1
720 TABLET, DELAYED RELEASE ORAL 2 TIMES DAILY
Status: DISCONTINUED | OUTPATIENT
Start: 2018-01-01 | End: 2018-01-01 | Stop reason: HOSPADM

## 2018-01-01 RX ORDER — LIDOCAINE HYDROCHLORIDE 10 MG/ML
INJECTION INFILTRATION; PERINEURAL
Status: COMPLETED
Start: 2018-01-01 | End: 2018-01-01

## 2018-01-01 RX ORDER — TAMSULOSIN HYDROCHLORIDE 0.4 MG/1
CAPSULE ORAL
Status: COMPLETED
Start: 2018-01-01 | End: 2018-01-01

## 2018-01-01 RX ORDER — PRAVASTATIN SODIUM 40 MG/1
40 TABLET ORAL DAILY
Status: DISCONTINUED | OUTPATIENT
Start: 2018-01-01 | End: 2018-01-01 | Stop reason: HOSPADM

## 2018-01-01 RX ORDER — TACROLIMUS 1 MG/1
1 CAPSULE ORAL 2 TIMES DAILY
Status: DISCONTINUED | OUTPATIENT
Start: 2018-01-01 | End: 2018-01-01

## 2018-01-01 RX ORDER — POLYETHYLENE GLYCOL 3350 17 G/17G
17 POWDER, FOR SOLUTION ORAL 2 TIMES DAILY
Qty: 10 PACKET | Refills: 0
Start: 2018-01-01

## 2018-01-01 RX ORDER — FENTANYL CITRATE 50 UG/ML
200 INJECTION, SOLUTION INTRAMUSCULAR; INTRAVENOUS
Status: DISCONTINUED | OUTPATIENT
Start: 2018-01-01 | End: 2018-01-01

## 2018-01-01 RX ORDER — MAGNESIUM SULFATE HEPTAHYDRATE 40 MG/ML
2 INJECTION, SOLUTION INTRAVENOUS ONCE
Status: COMPLETED | OUTPATIENT
Start: 2018-01-01 | End: 2018-01-01

## 2018-01-01 RX ORDER — PREDNISONE 5 MG/1
5 TABLET ORAL DAILY
Status: DISCONTINUED | OUTPATIENT
Start: 2018-01-01 | End: 2018-01-01 | Stop reason: HOSPADM

## 2018-01-01 RX ORDER — ONDANSETRON 2 MG/ML
INJECTION INTRAMUSCULAR; INTRAVENOUS
Status: DISCONTINUED | OUTPATIENT
Start: 2018-01-01 | End: 2018-01-01

## 2018-01-01 RX ORDER — MOXIFLOXACIN HYDROCHLORIDE 400 MG/1
400 TABLET ORAL DAILY
Qty: 5 TABLET | Refills: 0 | Status: SHIPPED | OUTPATIENT
Start: 2018-01-01 | End: 2018-01-01 | Stop reason: ALTCHOICE

## 2018-01-01 RX ORDER — FAMOTIDINE 10 MG/ML
20 INJECTION INTRAVENOUS 2 TIMES DAILY
Status: DISCONTINUED | OUTPATIENT
Start: 2018-01-01 | End: 2018-01-01

## 2018-01-01 RX ORDER — MYCOPHENOLATE MOFETIL 200 MG/ML
250 POWDER, FOR SUSPENSION ORAL 2 TIMES DAILY
Status: DISCONTINUED | OUTPATIENT
Start: 2018-01-01 | End: 2018-01-01

## 2018-01-01 RX ORDER — SENNOSIDES 8.8 MG/5ML
5 LIQUID ORAL NIGHTLY
Status: DISCONTINUED | OUTPATIENT
Start: 2018-01-01 | End: 2018-01-01

## 2018-01-01 RX ORDER — CISATRACURIUM BESYLATE 10 MG/ML
INJECTION, SOLUTION INTRAVENOUS
Status: DISCONTINUED | OUTPATIENT
Start: 2018-01-01 | End: 2018-01-01

## 2018-01-01 RX ORDER — FENTANYL 25 UG/1
1 PATCH TRANSDERMAL
Status: DISCONTINUED | OUTPATIENT
Start: 2018-01-01 | End: 2018-01-01

## 2018-01-01 RX ORDER — AZITHROMYCIN 250 MG/1
500 TABLET, FILM COATED ORAL ONCE
Status: COMPLETED | OUTPATIENT
Start: 2018-01-01 | End: 2018-01-01

## 2018-01-01 RX ORDER — TACROLIMUS 1 MG/1
3 CAPSULE ORAL 2 TIMES DAILY
Status: DISCONTINUED | OUTPATIENT
Start: 2018-01-01 | End: 2018-01-01 | Stop reason: HOSPADM

## 2018-01-01 RX ORDER — ADHESIVE BANDAGE
30 BANDAGE TOPICAL ONCE
Status: COMPLETED | OUTPATIENT
Start: 2018-01-01 | End: 2018-01-01

## 2018-01-01 RX ORDER — GABAPENTIN 300 MG/1
600 CAPSULE ORAL 3 TIMES DAILY
Status: DISCONTINUED | OUTPATIENT
Start: 2018-01-01 | End: 2018-01-01

## 2018-01-01 RX ORDER — OXYCODONE HCL 5 MG/5 ML
10 SOLUTION, ORAL ORAL 3 TIMES DAILY
Status: DISCONTINUED | OUTPATIENT
Start: 2018-01-01 | End: 2018-01-01

## 2018-01-01 RX ORDER — RAMELTEON 8 MG/1
8 TABLET ORAL ONCE
Status: COMPLETED | OUTPATIENT
Start: 2018-01-01 | End: 2018-01-01

## 2018-01-01 RX ORDER — MAGNESIUM SULFATE/D5W 2 G/50 ML
2 INTRAVENOUS SOLUTION, PIGGYBACK (ML) INTRAVENOUS
Status: DISPENSED | OUTPATIENT
Start: 2018-01-01 | End: 2018-01-01

## 2018-01-01 RX ORDER — OXYCODONE HYDROCHLORIDE 5 MG/1
TABLET ORAL
Status: COMPLETED
Start: 2018-01-01 | End: 2018-01-01

## 2018-01-01 RX ORDER — POTASSIUM CHLORIDE 20 MEQ/1
40 TABLET, EXTENDED RELEASE ORAL ONCE
Status: DISCONTINUED | OUTPATIENT
Start: 2018-01-01 | End: 2018-01-01

## 2018-01-01 RX ORDER — MYCOPHENOLIC ACID 180 MG/1
720 TABLET, DELAYED RELEASE ORAL 2 TIMES DAILY
Status: DISCONTINUED | OUTPATIENT
Start: 2018-01-01 | End: 2018-01-01

## 2018-01-01 RX ORDER — INSULIN ASPART 100 [IU]/ML
0-4 INJECTION, SOLUTION INTRAVENOUS; SUBCUTANEOUS
Status: DISCONTINUED | OUTPATIENT
Start: 2018-01-01 | End: 2018-01-01

## 2018-01-01 RX ORDER — PREDNISONE 2.5 MG/1
2.5 TABLET ORAL DAILY
Qty: 30 TABLET | Refills: 11 | Status: ON HOLD | OUTPATIENT
Start: 2018-01-01 | End: 2018-01-01

## 2018-01-01 RX ORDER — ESCITALOPRAM OXALATE 20 MG/1
20 TABLET ORAL DAILY
Qty: 30 TABLET | Refills: 11
Start: 2018-01-01

## 2018-01-01 RX ORDER — LEVALBUTEROL INHALATION SOLUTION 0.63 MG/3ML
0.63 SOLUTION RESPIRATORY (INHALATION)
Status: DISCONTINUED | OUTPATIENT
Start: 2018-01-01 | End: 2018-01-01 | Stop reason: HOSPADM

## 2018-01-01 RX ORDER — MYCOPHENOLIC ACID 360 MG/1
720 TABLET, DELAYED RELEASE ORAL 2 TIMES DAILY
Qty: 120 TABLET | Refills: 11 | Status: ON HOLD | OUTPATIENT
Start: 2018-01-01 | End: 2018-01-01 | Stop reason: HOSPADM

## 2018-01-01 RX ORDER — INSULIN ASPART 100 [IU]/ML
0-5 INJECTION, SOLUTION INTRAVENOUS; SUBCUTANEOUS EVERY 6 HOURS PRN
Status: DISCONTINUED | OUTPATIENT
Start: 2018-01-01 | End: 2018-01-01 | Stop reason: HOSPADM

## 2018-01-01 RX ORDER — MIDODRINE HYDROCHLORIDE 5 MG/1
10 TABLET ORAL 3 TIMES DAILY
Status: DISCONTINUED | OUTPATIENT
Start: 2018-01-01 | End: 2018-01-01 | Stop reason: HOSPADM

## 2018-01-01 RX ORDER — SCOLOPAMINE TRANSDERMAL SYSTEM 1 MG/1
1 PATCH, EXTENDED RELEASE TRANSDERMAL
Status: DISCONTINUED | OUTPATIENT
Start: 2018-01-01 | End: 2018-01-01 | Stop reason: HOSPADM

## 2018-01-01 RX ORDER — SODIUM CHLORIDE 0.9 % (FLUSH) 0.9 %
3 SYRINGE (ML) INJECTION
Status: DISCONTINUED | OUTPATIENT
Start: 2018-01-01 | End: 2018-01-01

## 2018-01-01 RX ORDER — OXYCODONE HYDROCHLORIDE 5 MG/1
5 TABLET ORAL EVERY 6 HOURS PRN
Status: DISCONTINUED | OUTPATIENT
Start: 2018-01-01 | End: 2018-01-01 | Stop reason: HOSPADM

## 2018-01-01 RX ORDER — ONDANSETRON 2 MG/ML
4 INJECTION INTRAMUSCULAR; INTRAVENOUS ONCE
Status: DISCONTINUED | OUTPATIENT
Start: 2018-01-01 | End: 2018-01-01

## 2018-01-01 RX ORDER — CEFEPIME HYDROCHLORIDE 1 G/1
1 INJECTION, POWDER, FOR SOLUTION INTRAMUSCULAR; INTRAVENOUS
Status: DISCONTINUED | OUTPATIENT
Start: 2018-01-01 | End: 2018-01-01

## 2018-01-01 RX ORDER — PROPOFOL 10 MG/ML
INJECTION, EMULSION INTRAVENOUS
Status: DISCONTINUED | OUTPATIENT
Start: 2018-01-01 | End: 2018-01-01

## 2018-01-01 RX ORDER — LIDOCAINE HYDROCHLORIDE 20 MG/ML
JELLY TOPICAL
Status: DISCONTINUED | OUTPATIENT
Start: 2018-01-01 | End: 2018-01-01

## 2018-01-01 RX ORDER — ESCITALOPRAM OXALATE 5 MG/1
20 TABLET ORAL EVERY MORNING
Status: DISCONTINUED | OUTPATIENT
Start: 2018-01-01 | End: 2018-01-01 | Stop reason: HOSPADM

## 2018-01-01 RX ORDER — ALPRAZOLAM 0.5 MG/1
0.5 TABLET, ORALLY DISINTEGRATING ORAL 3 TIMES DAILY PRN
Status: DISCONTINUED | OUTPATIENT
Start: 2018-01-01 | End: 2018-01-01

## 2018-01-01 RX ORDER — QUETIAPINE FUMARATE 50 MG/1
50 TABLET, FILM COATED ORAL NIGHTLY
Qty: 30 TABLET | Refills: 11
Start: 2018-01-01 | End: 2019-05-18

## 2018-01-01 RX ORDER — MAGNESIUM SULFATE HEPTAHYDRATE 40 MG/ML
2 INJECTION, SOLUTION INTRAVENOUS
Status: DISCONTINUED | OUTPATIENT
Start: 2018-01-01 | End: 2018-01-01

## 2018-01-01 RX ORDER — POSACONAZOLE 100 MG/1
300 TABLET, DELAYED RELEASE ORAL 2 TIMES DAILY
Status: DISCONTINUED | OUTPATIENT
Start: 2018-01-01 | End: 2018-01-01

## 2018-01-01 RX ORDER — LEVOTHYROXINE SODIUM 137 UG/1
137 TABLET ORAL
Qty: 30 TABLET | Refills: 11
Start: 2018-01-01 | End: 2019-05-18

## 2018-01-01 RX ORDER — SODIUM,POTASSIUM PHOSPHATES 280-250MG
1 POWDER IN PACKET (EA) ORAL
Status: DISCONTINUED | OUTPATIENT
Start: 2018-01-01 | End: 2018-01-01

## 2018-01-01 RX ORDER — ESCITALOPRAM OXALATE 10 MG/1
10 TABLET ORAL DAILY
Status: DISCONTINUED | OUTPATIENT
Start: 2018-01-01 | End: 2018-01-01

## 2018-01-01 RX ORDER — ASPIRIN 81 MG/1
81 TABLET ORAL DAILY
Qty: 30 TABLET | Refills: 11 | Status: ON HOLD | OUTPATIENT
Start: 2018-01-01 | End: 2018-01-01

## 2018-01-01 RX ORDER — GUAIFENESIN/DEXTROMETHORPHAN 100-10MG/5
5 SYRUP ORAL EVERY 4 HOURS PRN
Status: DISCONTINUED | OUTPATIENT
Start: 2018-01-01 | End: 2018-01-01

## 2018-01-01 RX ORDER — CETIRIZINE HYDROCHLORIDE 5 MG/1
5 TABLET ORAL DAILY
Qty: 30 TABLET | Refills: 0
Start: 2018-01-01 | End: 2019-05-18

## 2018-01-01 RX ORDER — ALBUTEROL SULFATE 90 UG/1
2 AEROSOL, METERED RESPIRATORY (INHALATION) EVERY 6 HOURS PRN
Status: DISCONTINUED | OUTPATIENT
Start: 2018-01-01 | End: 2018-01-01 | Stop reason: HOSPADM

## 2018-01-01 RX ORDER — LANOLIN ALCOHOL/MO/W.PET/CERES
CREAM (GRAM) TOPICAL
Status: COMPLETED
Start: 2018-01-01 | End: 2018-01-01

## 2018-01-01 RX ORDER — HYDROXYZINE HYDROCHLORIDE 25 MG/1
25 TABLET, FILM COATED ORAL 3 TIMES DAILY PRN
Status: DISCONTINUED | OUTPATIENT
Start: 2018-01-01 | End: 2018-01-01

## 2018-01-01 RX ORDER — SUCCINYLCHOLINE CHLORIDE 20 MG/ML
INJECTION INTRAMUSCULAR; INTRAVENOUS
Status: DISCONTINUED
Start: 2018-01-01 | End: 2018-01-01 | Stop reason: WASHOUT

## 2018-01-01 RX ORDER — POVIDONE-IODINE 10 MG/G
OINTMENT TOPICAL
Status: DISCONTINUED | OUTPATIENT
Start: 2018-01-01 | End: 2018-01-01

## 2018-01-01 RX ORDER — ACETAMINOPHEN 325 MG/1
650 TABLET ORAL 3 TIMES DAILY
Status: DISCONTINUED | OUTPATIENT
Start: 2018-01-01 | End: 2018-01-01 | Stop reason: HOSPADM

## 2018-01-01 RX ORDER — INSULIN ASPART 100 [IU]/ML
4 INJECTION, SOLUTION INTRAVENOUS; SUBCUTANEOUS
Status: DISCONTINUED | OUTPATIENT
Start: 2018-01-01 | End: 2018-01-01

## 2018-01-01 RX ORDER — MIDODRINE HYDROCHLORIDE 2.5 MG/1
2.5 TABLET ORAL 3 TIMES DAILY
Status: DISCONTINUED | OUTPATIENT
Start: 2018-01-01 | End: 2018-01-01

## 2018-01-01 RX ORDER — ROCURONIUM BROMIDE 10 MG/ML
INJECTION, SOLUTION INTRAVENOUS
Status: DISCONTINUED | OUTPATIENT
Start: 2018-01-01 | End: 2018-01-01

## 2018-01-01 RX ORDER — ESCITALOPRAM OXALATE 20 MG/1
20 TABLET ORAL DAILY
Qty: 30 TABLET | Refills: 11 | Status: ON HOLD | OUTPATIENT
Start: 2018-01-01 | End: 2018-01-01

## 2018-01-01 RX ORDER — PROPOFOL 10 MG/ML
5 INJECTION, EMULSION INTRAVENOUS CONTINUOUS
Status: DISCONTINUED | OUTPATIENT
Start: 2018-01-01 | End: 2018-01-01

## 2018-01-01 RX ORDER — INSULIN ASPART 100 [IU]/ML
3 INJECTION, SOLUTION INTRAVENOUS; SUBCUTANEOUS 3 TIMES DAILY
Qty: 2.7 ML | Refills: 11 | Status: SHIPPED | OUTPATIENT
Start: 2018-01-01 | End: 2018-01-01 | Stop reason: SDUPTHER

## 2018-01-01 RX ORDER — LANOLIN ALCOHOL/MO/W.PET/CERES
400 CREAM (GRAM) TOPICAL DAILY
Status: DISCONTINUED | OUTPATIENT
Start: 2018-01-01 | End: 2018-01-01

## 2018-01-01 RX ORDER — BENZONATATE 100 MG/1
100 CAPSULE ORAL 3 TIMES DAILY PRN
Qty: 30 CAPSULE | Refills: 0 | Status: ON HOLD
Start: 2018-01-01 | End: 2018-01-01 | Stop reason: HOSPADM

## 2018-01-01 RX ORDER — LIDOCAINE HYDROCHLORIDE 20 MG/ML
INJECTION, SOLUTION INFILTRATION; PERINEURAL
Status: DISPENSED
Start: 2018-01-01 | End: 2018-01-01

## 2018-01-01 RX ORDER — INSULIN ASPART 100 [IU]/ML
4 INJECTION, SOLUTION INTRAVENOUS; SUBCUTANEOUS
Status: DISCONTINUED | OUTPATIENT
Start: 2018-01-01 | End: 2018-01-01 | Stop reason: HOSPADM

## 2018-01-01 RX ORDER — SODIUM CHLORIDE 9 MG/ML
INJECTION, SOLUTION INTRAVENOUS ONCE
Status: DISCONTINUED | OUTPATIENT
Start: 2018-01-01 | End: 2018-01-01 | Stop reason: HOSPADM

## 2018-01-01 RX ORDER — LANOLIN ALCOHOL/MO/W.PET/CERES
800 CREAM (GRAM) TOPICAL
Status: DISCONTINUED | OUTPATIENT
Start: 2018-01-01 | End: 2018-01-01

## 2018-01-01 RX ORDER — POLYETHYLENE GLYCOL 3350 17 G/17G
17 POWDER, FOR SOLUTION ORAL 2 TIMES DAILY
Status: DISCONTINUED | OUTPATIENT
Start: 2018-01-01 | End: 2018-01-01

## 2018-01-01 RX ORDER — PROPOFOL 10 MG/ML
INJECTION, EMULSION INTRAVENOUS
Status: COMPLETED
Start: 2018-01-01 | End: 2018-01-01

## 2018-01-01 RX ORDER — CEFAZOLIN SODIUM 1 G/3ML
2 INJECTION, POWDER, FOR SOLUTION INTRAMUSCULAR; INTRAVENOUS
Status: COMPLETED | OUTPATIENT
Start: 2018-01-01 | End: 2018-01-01

## 2018-01-01 RX ORDER — TORSEMIDE 20 MG/1
40 TABLET ORAL DAILY
Qty: 60 TABLET | Refills: 11 | Status: ON HOLD | OUTPATIENT
Start: 2018-01-01 | End: 2018-01-01 | Stop reason: HOSPADM

## 2018-01-01 RX ORDER — ONDANSETRON 4 MG/1
4 TABLET, ORALLY DISINTEGRATING ORAL EVERY 6 HOURS PRN
Status: DISCONTINUED | OUTPATIENT
Start: 2018-01-01 | End: 2018-01-01

## 2018-01-01 RX ORDER — OXYCODONE AND ACETAMINOPHEN 5; 325 MG/1; MG/1
1 TABLET ORAL EVERY 4 HOURS PRN
Qty: 31 TABLET | Refills: 0 | Status: ON HOLD | OUTPATIENT
Start: 2018-01-01 | End: 2018-01-01 | Stop reason: HOSPADM

## 2018-01-01 RX ORDER — LIDOCAINE HYDROCHLORIDE 20 MG/ML
2 JELLY TOPICAL ONCE
Status: COMPLETED | OUTPATIENT
Start: 2018-01-01 | End: 2018-01-01

## 2018-01-01 RX ORDER — OXYCODONE HCL 5 MG/5 ML
10 SOLUTION, ORAL ORAL EVERY 6 HOURS PRN
Status: DISCONTINUED | OUTPATIENT
Start: 2018-01-01 | End: 2018-01-01

## 2018-01-01 RX ORDER — MYCOPHENOLATE MOFETIL 250 MG/1
250 CAPSULE ORAL 2 TIMES DAILY
Qty: 60 CAPSULE | Refills: 11
Start: 2018-01-01 | End: 2019-05-18

## 2018-01-01 RX ORDER — MYCOPHENOLATE MOFETIL 250 MG/1
250 CAPSULE ORAL 2 TIMES DAILY
Status: DISCONTINUED | OUTPATIENT
Start: 2018-01-01 | End: 2018-01-01 | Stop reason: HOSPADM

## 2018-01-01 RX ORDER — ONDANSETRON 2 MG/ML
4 INJECTION INTRAMUSCULAR; INTRAVENOUS EVERY 8 HOURS PRN
Status: DISCONTINUED | OUTPATIENT
Start: 2018-01-01 | End: 2018-01-01

## 2018-01-01 RX ORDER — DEXMEDETOMIDINE HYDROCHLORIDE 4 UG/ML
0.2 INJECTION, SOLUTION INTRAVENOUS CONTINUOUS
Status: DISCONTINUED | OUTPATIENT
Start: 2018-01-01 | End: 2018-01-01 | Stop reason: HOSPADM

## 2018-01-01 RX ORDER — ASPIRIN 81 MG/1
81 TABLET ORAL DAILY
Qty: 30 TABLET | Refills: 11
Start: 2018-01-01

## 2018-01-01 RX ORDER — ALBUTEROL SULFATE 0.83 MG/ML
10 SOLUTION RESPIRATORY (INHALATION) ONCE
Status: COMPLETED | OUTPATIENT
Start: 2018-01-01 | End: 2018-01-01

## 2018-01-01 RX ORDER — INSULIN ASPART 100 [IU]/ML
5 INJECTION, SOLUTION INTRAVENOUS; SUBCUTANEOUS
Status: DISCONTINUED | OUTPATIENT
Start: 2018-01-01 | End: 2018-01-01

## 2018-01-01 RX ORDER — DEXTROSE 50 % IN WATER (D50W) INTRAVENOUS SYRINGE
Status: COMPLETED
Start: 2018-01-01 | End: 2018-01-01

## 2018-01-01 RX ORDER — VANCOMYCIN/0.9 % SOD CHLORIDE 1 G/100 ML
1000 PLASTIC BAG, INJECTION (ML) INTRAVENOUS
Status: DISCONTINUED | OUTPATIENT
Start: 2018-01-01 | End: 2018-01-01

## 2018-01-01 RX ORDER — IPRATROPIUM BROMIDE AND ALBUTEROL SULFATE 2.5; .5 MG/3ML; MG/3ML
3 SOLUTION RESPIRATORY (INHALATION) EVERY 4 HOURS PRN
Qty: 1 BOX | Refills: 0
Start: 2018-01-01 | End: 2019-05-18

## 2018-01-01 RX ORDER — POTASSIUM CHLORIDE 20 MEQ/15ML
40 SOLUTION ORAL
Status: DISCONTINUED | OUTPATIENT
Start: 2018-01-01 | End: 2018-01-01

## 2018-01-01 RX ORDER — PREDNISONE 2.5 MG/1
2.5 TABLET ORAL DAILY
Qty: 30 TABLET | Refills: 11
Start: 2018-01-01

## 2018-01-01 RX ORDER — DOCUSATE SODIUM 50 MG/5ML
100 LIQUID ORAL 2 TIMES DAILY
Status: DISCONTINUED | OUTPATIENT
Start: 2018-01-01 | End: 2018-01-01

## 2018-01-01 RX ORDER — POTASSIUM CHLORIDE 20 MEQ/15ML
20 SOLUTION ORAL ONCE
Status: COMPLETED | OUTPATIENT
Start: 2018-01-01 | End: 2018-01-01

## 2018-01-01 RX ORDER — INSULIN ASPART 100 [IU]/ML
3-7 INJECTION, SOLUTION INTRAVENOUS; SUBCUTANEOUS
Status: DISCONTINUED | OUTPATIENT
Start: 2018-01-01 | End: 2018-01-01 | Stop reason: HOSPADM

## 2018-01-01 RX ORDER — POSACONAZOLE 100 MG/1
300 TABLET, DELAYED RELEASE ORAL DAILY
Status: DISCONTINUED | OUTPATIENT
Start: 2018-01-01 | End: 2018-01-01

## 2018-01-01 RX ORDER — ONDANSETRON 8 MG/1
8 TABLET, ORALLY DISINTEGRATING ORAL EVERY 6 HOURS PRN
Status: DISCONTINUED | OUTPATIENT
Start: 2018-01-01 | End: 2018-01-01 | Stop reason: HOSPADM

## 2018-01-01 RX ORDER — INSULIN ASPART 100 [IU]/ML
1-2 INJECTION, SOLUTION INTRAVENOUS; SUBCUTANEOUS
Status: DISCONTINUED | OUTPATIENT
Start: 2018-01-01 | End: 2018-01-01

## 2018-01-01 RX ORDER — GUAIFENESIN 600 MG/1
600 TABLET, EXTENDED RELEASE ORAL 2 TIMES DAILY
Status: DISCONTINUED | OUTPATIENT
Start: 2018-01-01 | End: 2018-01-01

## 2018-01-01 RX ORDER — AZITHROMYCIN 250 MG/1
500 TABLET, FILM COATED ORAL DAILY
Status: DISCONTINUED | OUTPATIENT
Start: 2018-01-01 | End: 2018-01-01

## 2018-01-01 RX ORDER — FENTANYL CITRATE 50 UG/ML
INJECTION, SOLUTION INTRAMUSCULAR; INTRAVENOUS
Status: COMPLETED
Start: 2018-01-01 | End: 2018-01-01

## 2018-01-01 RX ORDER — HEPARIN SODIUM 5000 [USP'U]/ML
5000 INJECTION, SOLUTION INTRAVENOUS; SUBCUTANEOUS EVERY 8 HOURS
Status: DISCONTINUED | OUTPATIENT
Start: 2018-01-01 | End: 2018-01-01

## 2018-01-01 RX ORDER — DEXMEDETOMIDINE HYDROCHLORIDE 4 UG/ML
0.2 INJECTION, SOLUTION INTRAVENOUS CONTINUOUS
Status: DISCONTINUED | OUTPATIENT
Start: 2018-01-01 | End: 2018-01-01

## 2018-01-01 RX ORDER — TACROLIMUS 1 MG/1
5 CAPSULE ORAL 2 TIMES DAILY
Status: DISCONTINUED | OUTPATIENT
Start: 2018-01-01 | End: 2018-01-01 | Stop reason: HOSPADM

## 2018-01-01 RX ORDER — LORAZEPAM 2 MG/ML
1 INJECTION INTRAMUSCULAR EVERY 30 MIN PRN
Status: DISCONTINUED | OUTPATIENT
Start: 2018-01-01 | End: 2018-01-01 | Stop reason: HOSPADM

## 2018-01-01 RX ORDER — LIDOCAINE HYDROCHLORIDE 20 MG/ML
20 INJECTION, SOLUTION INFILTRATION; PERINEURAL ONCE
Status: COMPLETED | OUTPATIENT
Start: 2018-01-01 | End: 2018-01-01

## 2018-01-01 RX ORDER — ALBUTEROL SULFATE 0.83 MG/ML
2.5 SOLUTION RESPIRATORY (INHALATION) EVERY 4 HOURS PRN
Status: DISCONTINUED | OUTPATIENT
Start: 2018-01-01 | End: 2018-01-01

## 2018-01-01 RX ORDER — IPRATROPIUM BROMIDE AND ALBUTEROL SULFATE 2.5; .5 MG/3ML; MG/3ML
3 SOLUTION RESPIRATORY (INHALATION) ONCE
Status: DISCONTINUED | OUTPATIENT
Start: 2018-01-01 | End: 2018-01-01

## 2018-01-01 RX ORDER — ESCITALOPRAM OXALATE 20 MG/1
20 TABLET ORAL EVERY MORNING
Status: DISCONTINUED | OUTPATIENT
Start: 2018-01-01 | End: 2018-01-01 | Stop reason: HOSPADM

## 2018-01-01 RX ORDER — INSULIN ASPART 100 [IU]/ML
0-5 INJECTION, SOLUTION INTRAVENOUS; SUBCUTANEOUS EVERY 4 HOURS PRN
Status: DISCONTINUED | OUTPATIENT
Start: 2018-01-01 | End: 2018-01-01

## 2018-01-01 RX ORDER — POLYETHYLENE GLYCOL 3350 17 G/17G
17 POWDER, FOR SOLUTION ORAL DAILY
Status: DISCONTINUED | OUTPATIENT
Start: 2018-01-01 | End: 2018-01-01 | Stop reason: HOSPADM

## 2018-01-01 RX ORDER — POTASSIUM CHLORIDE 20 MEQ/1
20 TABLET, EXTENDED RELEASE ORAL ONCE
Status: DISCONTINUED | OUTPATIENT
Start: 2018-01-01 | End: 2018-01-01

## 2018-01-01 RX ORDER — NORTRIPTYLINE HYDROCHLORIDE 25 MG/1
25 CAPSULE ORAL NIGHTLY
Status: DISCONTINUED | OUTPATIENT
Start: 2018-01-01 | End: 2018-01-01

## 2018-01-01 RX ORDER — LEVOTHYROXINE SODIUM 75 UG/1
150 TABLET ORAL DAILY
Status: DISCONTINUED | OUTPATIENT
Start: 2018-01-01 | End: 2018-01-01

## 2018-01-01 RX ORDER — METOCLOPRAMIDE HYDROCHLORIDE 5 MG/ML
5 INJECTION INTRAMUSCULAR; INTRAVENOUS EVERY 6 HOURS
Status: DISCONTINUED | OUTPATIENT
Start: 2018-01-01 | End: 2018-01-01

## 2018-01-01 RX ORDER — TACROLIMUS 1 MG/1
1 CAPSULE ORAL EVERY EVENING
Status: COMPLETED | OUTPATIENT
Start: 2018-01-01 | End: 2018-01-01

## 2018-01-01 RX ORDER — CHLORHEXIDINE GLUCONATE ORAL RINSE 1.2 MG/ML
15 SOLUTION DENTAL 2 TIMES DAILY
Status: DISCONTINUED | OUTPATIENT
Start: 2018-01-01 | End: 2018-01-01 | Stop reason: HOSPADM

## 2018-01-01 RX ORDER — ACETAMINOPHEN 325 MG/1
325 TABLET ORAL EVERY 4 HOURS PRN
Status: ACTIVE | OUTPATIENT
Start: 2018-01-01 | End: 2018-01-01

## 2018-01-01 RX ORDER — SODIUM CHLORIDE 0.9 % (FLUSH) 0.9 %
3 SYRINGE (ML) INJECTION EVERY 8 HOURS
Status: DISCONTINUED | OUTPATIENT
Start: 2018-01-01 | End: 2018-01-01

## 2018-01-01 RX ORDER — INSULIN ASPART 100 [IU]/ML
4 INJECTION, SOLUTION INTRAVENOUS; SUBCUTANEOUS 3 TIMES DAILY
Qty: 3.6 ML | Refills: 11 | Status: ON HOLD | OUTPATIENT
Start: 2018-01-01 | End: 2018-01-01

## 2018-01-01 RX ORDER — DIPHENHYDRAMINE HYDROCHLORIDE 50 MG/ML
25 INJECTION INTRAMUSCULAR; INTRAVENOUS ONCE
Status: COMPLETED | OUTPATIENT
Start: 2018-01-01 | End: 2018-01-01

## 2018-01-01 RX ORDER — POTASSIUM CHLORIDE 20 MEQ/15ML
40 SOLUTION ORAL ONCE
Status: COMPLETED | OUTPATIENT
Start: 2018-01-01 | End: 2018-01-01

## 2018-01-01 RX ORDER — MIDAZOLAM HYDROCHLORIDE 1 MG/ML
4 INJECTION INTRAMUSCULAR; INTRAVENOUS
Status: DISCONTINUED | OUTPATIENT
Start: 2018-01-01 | End: 2018-01-01

## 2018-01-01 RX ORDER — TACROLIMUS 1 MG/1
4 CAPSULE ORAL 2 TIMES DAILY
Status: DISCONTINUED | OUTPATIENT
Start: 2018-01-01 | End: 2018-01-01 | Stop reason: HOSPADM

## 2018-01-01 RX ORDER — MIDODRINE HYDROCHLORIDE 5 MG/1
10 TABLET ORAL ONCE
Status: DISCONTINUED | OUTPATIENT
Start: 2018-01-01 | End: 2018-01-01

## 2018-01-01 RX ORDER — ACETAMINOPHEN 325 MG/1
650 TABLET ORAL ONCE
Status: COMPLETED | OUTPATIENT
Start: 2018-01-01 | End: 2018-01-01

## 2018-01-01 RX ORDER — TACROLIMUS 1 MG/1
CAPSULE ORAL
Qty: 60 CAPSULE | Refills: 0 | Status: ON HOLD
Start: 2018-01-01 | End: 2018-01-01 | Stop reason: HOSPADM

## 2018-01-01 RX ORDER — ALPRAZOLAM 0.5 MG/1
1 TABLET, ORALLY DISINTEGRATING ORAL 3 TIMES DAILY PRN
Status: DISCONTINUED | OUTPATIENT
Start: 2018-01-01 | End: 2018-01-01

## 2018-01-01 RX ORDER — MIDAZOLAM HYDROCHLORIDE 1 MG/ML
10 INJECTION INTRAMUSCULAR; INTRAVENOUS
Status: DISCONTINUED | OUTPATIENT
Start: 2018-01-01 | End: 2018-01-01

## 2018-01-01 RX ORDER — METOCLOPRAMIDE HYDROCHLORIDE 5 MG/ML
5 INJECTION INTRAMUSCULAR; INTRAVENOUS EVERY 6 HOURS PRN
Status: DISCONTINUED | OUTPATIENT
Start: 2018-01-01 | End: 2018-01-01

## 2018-01-01 RX ORDER — DEXAMETHASONE SODIUM PHOSPHATE 4 MG/ML
INJECTION, SOLUTION INTRA-ARTICULAR; INTRALESIONAL; INTRAMUSCULAR; INTRAVENOUS; SOFT TISSUE
Status: DISCONTINUED | OUTPATIENT
Start: 2018-01-01 | End: 2018-01-01

## 2018-01-01 RX ORDER — GABAPENTIN 300 MG/1
900 CAPSULE ORAL 3 TIMES DAILY
Status: DISCONTINUED | OUTPATIENT
Start: 2018-01-01 | End: 2018-01-01

## 2018-01-01 RX ORDER — LANOLIN ALCOHOL/MO/W.PET/CERES
400 CREAM (GRAM) TOPICAL 2 TIMES DAILY
Status: DISCONTINUED | OUTPATIENT
Start: 2018-01-01 | End: 2018-01-01

## 2018-01-01 RX ORDER — FUROSEMIDE 10 MG/ML
80 INJECTION INTRAMUSCULAR; INTRAVENOUS ONCE
Status: COMPLETED | OUTPATIENT
Start: 2018-01-01 | End: 2018-01-01

## 2018-01-01 RX ORDER — MOXIFLOXACIN HYDROCHLORIDE 400 MG/1
400 TABLET ORAL DAILY
Status: COMPLETED | OUTPATIENT
Start: 2018-01-01 | End: 2018-01-01

## 2018-01-01 RX ORDER — NALOXONE HCL 0.4 MG/ML
0.02 VIAL (ML) INJECTION
Status: DISCONTINUED | OUTPATIENT
Start: 2018-01-01 | End: 2018-01-01 | Stop reason: HOSPADM

## 2018-01-01 RX ORDER — BENZONATATE 100 MG/1
200 CAPSULE ORAL 3 TIMES DAILY PRN
Status: DISCONTINUED | OUTPATIENT
Start: 2018-01-01 | End: 2018-01-01

## 2018-01-01 RX ORDER — OXYCODONE HCL 5 MG/5 ML
5 SOLUTION, ORAL ORAL EVERY 6 HOURS PRN
Status: DISCONTINUED | OUTPATIENT
Start: 2018-01-01 | End: 2018-01-01

## 2018-01-01 RX ORDER — LANOLIN ALCOHOL/MO/W.PET/CERES
800 CREAM (GRAM) TOPICAL ONCE
Status: COMPLETED | OUTPATIENT
Start: 2018-01-01 | End: 2018-01-01

## 2018-01-01 RX ORDER — LIDOCAINE HCL/PF 100 MG/5ML
SYRINGE (ML) INTRAVENOUS
Status: DISCONTINUED | OUTPATIENT
Start: 2018-01-01 | End: 2018-01-01

## 2018-01-01 RX ORDER — LEVOTHYROXINE SODIUM ANHYDROUS 100 UG/5ML
75 INJECTION, POWDER, LYOPHILIZED, FOR SOLUTION INTRAVENOUS DAILY
Status: DISCONTINUED | OUTPATIENT
Start: 2018-01-01 | End: 2018-01-01

## 2018-01-01 RX ORDER — MIDODRINE HYDROCHLORIDE 5 MG/1
15 TABLET ORAL
Qty: 30 TABLET | Refills: 0
Start: 2018-01-01 | End: 2019-05-18

## 2018-01-01 RX ORDER — LIDOCAINE HYDROCHLORIDE 20 MG/ML
2 JELLY TOPICAL ONCE
Status: DISCONTINUED | OUTPATIENT
Start: 2018-01-01 | End: 2018-01-01

## 2018-01-01 RX ORDER — LORAZEPAM 1 MG/1
1 TABLET ORAL EVERY 30 MIN PRN
Status: DISCONTINUED | OUTPATIENT
Start: 2018-01-01 | End: 2018-01-01 | Stop reason: HOSPADM

## 2018-01-01 RX ORDER — ALPRAZOLAM 0.25 MG/1
0.25 TABLET ORAL ONCE
Status: COMPLETED | OUTPATIENT
Start: 2018-01-01 | End: 2018-01-01

## 2018-01-01 RX ORDER — GABAPENTIN 300 MG/1
900 CAPSULE ORAL 3 TIMES DAILY
Qty: 90 CAPSULE | Refills: 1 | Status: ON HOLD | OUTPATIENT
Start: 2018-01-01 | End: 2018-01-01

## 2018-01-01 RX ORDER — INSULIN ASPART 100 [IU]/ML
0-5 INJECTION, SOLUTION INTRAVENOUS; SUBCUTANEOUS
Status: DISCONTINUED | OUTPATIENT
Start: 2018-01-01 | End: 2018-01-01 | Stop reason: SDUPTHER

## 2018-01-01 RX ORDER — TACROLIMUS 1 MG/1
5 CAPSULE ORAL 2 TIMES DAILY
Status: DISCONTINUED | OUTPATIENT
Start: 2018-01-01 | End: 2018-01-01

## 2018-01-01 RX ORDER — VASOPRESSIN 20 [USP'U]/ML
INJECTION, SOLUTION INTRAMUSCULAR; SUBCUTANEOUS
Status: DISCONTINUED | OUTPATIENT
Start: 2018-01-01 | End: 2018-01-01

## 2018-01-01 RX ORDER — NOREPINEPHRINE BITARTRATE/D5W 4MG/250ML
0.02 PLASTIC BAG, INJECTION (ML) INTRAVENOUS CONTINUOUS
Status: DISCONTINUED | OUTPATIENT
Start: 2018-01-01 | End: 2018-01-01 | Stop reason: HOSPADM

## 2018-01-01 RX ORDER — TACROLIMUS 1 MG/1
1 CAPSULE ORAL ONCE
Status: COMPLETED | OUTPATIENT
Start: 2018-01-01 | End: 2018-01-01

## 2018-01-01 RX ORDER — ALBUMIN HUMAN 250 G/1000ML
25 SOLUTION INTRAVENOUS CONTINUOUS PRN
Status: DISCONTINUED | OUTPATIENT
Start: 2018-01-01 | End: 2018-01-01 | Stop reason: HOSPADM

## 2018-01-01 RX ORDER — FAMOTIDINE 10 MG/ML
20 INJECTION INTRAVENOUS DAILY
Status: DISCONTINUED | OUTPATIENT
Start: 2018-01-01 | End: 2018-01-01

## 2018-01-01 RX ORDER — KETAMINE HCL IN 0.9 % NACL 50 MG/5 ML
SYRINGE (ML) INTRAVENOUS
Status: DISCONTINUED | OUTPATIENT
Start: 2018-01-01 | End: 2018-01-01

## 2018-01-01 RX ORDER — LEVOTHYROXINE SODIUM 150 UG/1
150 TABLET ORAL EVERY MORNING
Status: DISCONTINUED | OUTPATIENT
Start: 2018-01-01 | End: 2018-01-01 | Stop reason: HOSPADM

## 2018-01-01 RX ORDER — MYCOPHENOLATE MOFETIL 250 MG/1
250 CAPSULE ORAL 2 TIMES DAILY
Status: DISCONTINUED | OUTPATIENT
Start: 2018-01-01 | End: 2018-01-01

## 2018-01-01 RX ORDER — DOCUSATE SODIUM 100 MG/1
100 CAPSULE, LIQUID FILLED ORAL 2 TIMES DAILY
Status: DISCONTINUED | OUTPATIENT
Start: 2018-01-01 | End: 2018-01-01 | Stop reason: HOSPADM

## 2018-01-01 RX ORDER — ACETAMINOPHEN 650 MG/20.3ML
650 LIQUID ORAL EVERY 4 HOURS PRN
Status: DISCONTINUED | OUTPATIENT
Start: 2018-01-01 | End: 2018-01-01

## 2018-01-01 RX ORDER — FENTANYL CITRATE 50 UG/ML
50 INJECTION, SOLUTION INTRAMUSCULAR; INTRAVENOUS
Status: DISCONTINUED | OUTPATIENT
Start: 2018-01-01 | End: 2018-01-01

## 2018-01-01 RX ORDER — GUAIFENESIN 600 MG/1
1200 TABLET, EXTENDED RELEASE ORAL 2 TIMES DAILY
Status: DISCONTINUED | OUTPATIENT
Start: 2018-01-01 | End: 2018-01-01 | Stop reason: HOSPADM

## 2018-01-01 RX ORDER — IPRATROPIUM BROMIDE AND ALBUTEROL SULFATE 2.5; .5 MG/3ML; MG/3ML
3 SOLUTION RESPIRATORY (INHALATION) EVERY 4 HOURS PRN
Status: DISCONTINUED | OUTPATIENT
Start: 2018-01-01 | End: 2018-01-01 | Stop reason: HOSPADM

## 2018-01-01 RX ORDER — ALPRAZOLAM 0.5 MG/1
0.5 TABLET ORAL EVERY 8 HOURS PRN
Status: DISCONTINUED | OUTPATIENT
Start: 2018-01-01 | End: 2018-01-01

## 2018-01-01 RX ORDER — ACETAMINOPHEN 650 MG/20.3ML
650 LIQUID ORAL ONCE
Status: COMPLETED | OUTPATIENT
Start: 2018-01-01 | End: 2018-01-01

## 2018-01-01 RX ORDER — INSULIN ASPART 100 [IU]/ML
4 INJECTION, SOLUTION INTRAVENOUS; SUBCUTANEOUS 3 TIMES DAILY
Qty: 3.6 ML | Refills: 11
Start: 2018-01-01 | End: 2019-05-18

## 2018-01-01 RX ORDER — ONDANSETRON 2 MG/ML
4 INJECTION INTRAMUSCULAR; INTRAVENOUS EVERY 8 HOURS PRN
Status: DISCONTINUED | OUTPATIENT
Start: 2018-01-01 | End: 2018-01-01 | Stop reason: HOSPADM

## 2018-01-01 RX ORDER — PEN NEEDLE, DIABETIC 30 GX3/16"
NEEDLE, DISPOSABLE MISCELLANEOUS
Qty: 200 EACH | Refills: 12
Start: 2018-01-01

## 2018-01-01 RX ORDER — BENZONATATE 200 MG/1
200 CAPSULE ORAL 3 TIMES DAILY PRN
Qty: 90 CAPSULE | Refills: 6 | Status: SHIPPED | OUTPATIENT
Start: 2018-01-01 | End: 2018-01-01

## 2018-01-01 RX ORDER — RAMELTEON 8 MG/1
8 TABLET ORAL NIGHTLY PRN
Status: DISCONTINUED | OUTPATIENT
Start: 2018-01-01 | End: 2018-01-01

## 2018-01-01 RX ORDER — MIDODRINE HYDROCHLORIDE 5 MG/1
15 TABLET ORAL
Status: DISCONTINUED | OUTPATIENT
Start: 2018-01-01 | End: 2018-01-01

## 2018-01-01 RX ORDER — ONDANSETRON 4 MG/1
8 TABLET, ORALLY DISINTEGRATING ORAL EVERY 8 HOURS PRN
Qty: 15 TABLET | Refills: 0
Start: 2018-01-01

## 2018-01-01 RX ORDER — IPRATROPIUM BROMIDE AND ALBUTEROL SULFATE 2.5; .5 MG/3ML; MG/3ML
3 SOLUTION RESPIRATORY (INHALATION) ONCE
Status: COMPLETED | OUTPATIENT
Start: 2018-01-01 | End: 2018-01-01

## 2018-01-01 RX ORDER — POSACONAZOLE 40 MG/ML
200 SUSPENSION ORAL
Status: DISCONTINUED | OUTPATIENT
Start: 2018-01-01 | End: 2018-01-01

## 2018-01-01 RX ORDER — HEPARIN 100 UNIT/ML
SYRINGE INTRAVENOUS
Status: DISCONTINUED | OUTPATIENT
Start: 2018-01-01 | End: 2018-01-01 | Stop reason: HOSPADM

## 2018-01-01 RX ORDER — OXYCODONE HYDROCHLORIDE 5 MG/1
5 TABLET ORAL EVERY 6 HOURS PRN
Qty: 15 TABLET | Refills: 0
Start: 2018-01-01

## 2018-01-01 RX ORDER — ALBUMIN HUMAN 250 G/1000ML
50 SOLUTION INTRAVENOUS ONCE
Status: COMPLETED | OUTPATIENT
Start: 2018-01-01 | End: 2018-01-01

## 2018-01-01 RX ADMIN — CETIRIZINE HYDROCHLORIDE 5 MG: 5 TABLET, FILM COATED ORAL at 11:04

## 2018-01-01 RX ADMIN — TACROLIMUS 5 MG: 1 CAPSULE ORAL at 05:05

## 2018-01-01 RX ADMIN — MIDODRINE HYDROCHLORIDE 10 MG: 5 TABLET ORAL at 10:05

## 2018-01-01 RX ADMIN — DEXTROSE 300 MG: 5 SOLUTION INTRAVENOUS at 11:03

## 2018-01-01 RX ADMIN — MAGNESIUM SULFATE IN WATER 2 G: 40 INJECTION, SOLUTION INTRAVENOUS at 04:06

## 2018-01-01 RX ADMIN — MIDODRINE HYDROCHLORIDE 15 MG: 5 TABLET ORAL at 07:05

## 2018-01-01 RX ADMIN — CHLORHEXIDINE GLUCONATE 15 ML: 1.2 RINSE ORAL at 01:04

## 2018-01-01 RX ADMIN — MYCOPHENOLIC ACID 720 MG: 180 TABLET, DELAYED RELEASE ORAL at 02:01

## 2018-01-01 RX ADMIN — MIDAZOLAM HYDROCHLORIDE 2 MG: 1 INJECTION, SOLUTION INTRAMUSCULAR; INTRAVENOUS at 07:01

## 2018-01-01 RX ADMIN — ESCITALOPRAM OXALATE 20 MG: 10 TABLET ORAL at 09:03

## 2018-01-01 RX ADMIN — HEPARIN SODIUM 5000 UNITS: 5000 INJECTION, SOLUTION INTRAVENOUS; SUBCUTANEOUS at 09:05

## 2018-01-01 RX ADMIN — PREDNISONE 2.5 MG: 2.5 TABLET ORAL at 09:06

## 2018-01-01 RX ADMIN — MIDODRINE HYDROCHLORIDE 2.5 MG: 2.5 TABLET ORAL at 04:04

## 2018-01-01 RX ADMIN — DOCUSATE SODIUM 100 MG: 100 CAPSULE, LIQUID FILLED ORAL at 08:05

## 2018-01-01 RX ADMIN — PIPERACILLIN AND TAZOBACTAM 4.5 G: 4; .5 INJECTION, POWDER, LYOPHILIZED, FOR SOLUTION INTRAVENOUS; PARENTERAL at 10:03

## 2018-01-01 RX ADMIN — MIDODRINE HYDROCHLORIDE 10 MG: 5 TABLET ORAL at 09:05

## 2018-01-01 RX ADMIN — INSULIN ASPART 2 UNITS: 100 INJECTION, SOLUTION INTRAVENOUS; SUBCUTANEOUS at 04:02

## 2018-01-01 RX ADMIN — FAMOTIDINE 20 MG: 10 INJECTION, SOLUTION INTRAVENOUS at 10:03

## 2018-01-01 RX ADMIN — HEPARIN SODIUM 5000 UNITS: 5000 INJECTION, SOLUTION INTRAVENOUS; SUBCUTANEOUS at 08:04

## 2018-01-01 RX ADMIN — MIDODRINE HYDROCHLORIDE 2.5 MG: 2.5 TABLET ORAL at 05:04

## 2018-01-01 RX ADMIN — ONDANSETRON 8 MG: 8 TABLET, ORALLY DISINTEGRATING ORAL at 03:05

## 2018-01-01 RX ADMIN — SODIUM CHLORIDE: 0.9 INJECTION, SOLUTION INTRAVENOUS at 11:03

## 2018-01-01 RX ADMIN — INSULIN DETEMIR 6 UNITS: 100 INJECTION, SOLUTION SUBCUTANEOUS at 08:06

## 2018-01-01 RX ADMIN — HEPARIN SODIUM 1000 UNITS: 1000 INJECTION, SOLUTION INTRAVENOUS; SUBCUTANEOUS at 12:06

## 2018-01-01 RX ADMIN — POTASSIUM CHLORIDE 40 MEQ: 20 SOLUTION ORAL at 12:03

## 2018-01-01 RX ADMIN — INSULIN DETEMIR 6 UNITS: 100 INJECTION, SOLUTION SUBCUTANEOUS at 09:06

## 2018-01-01 RX ADMIN — GABAPENTIN 100 MG: 100 CAPSULE ORAL at 09:06

## 2018-01-01 RX ADMIN — PANTOPRAZOLE SODIUM 40 MG: 40 TABLET, DELAYED RELEASE ORAL at 08:06

## 2018-01-01 RX ADMIN — LEVALBUTEROL HYDROCHLORIDE 0.63 MG: 0.63 SOLUTION RESPIRATORY (INHALATION) at 10:05

## 2018-01-01 RX ADMIN — FAMOTIDINE 20 MG: 20 TABLET, FILM COATED ORAL at 08:05

## 2018-01-01 RX ADMIN — CETIRIZINE HYDROCHLORIDE 5 MG: 5 TABLET, FILM COATED ORAL at 09:04

## 2018-01-01 RX ADMIN — OXYCODONE HYDROCHLORIDE 10 MG: 5 SOLUTION ORAL at 08:04

## 2018-01-01 RX ADMIN — CETIRIZINE HYDROCHLORIDE 5 MG: 5 TABLET, FILM COATED ORAL at 01:05

## 2018-01-01 RX ADMIN — MIDODRINE HYDROCHLORIDE 2.5 MG: 2.5 TABLET ORAL at 08:04

## 2018-01-01 RX ADMIN — GABAPENTIN 300 MG: 300 CAPSULE ORAL at 09:06

## 2018-01-01 RX ADMIN — ERYTHROPOIETIN 4000 UNITS: 4000 INJECTION, SOLUTION INTRAVENOUS; SUBCUTANEOUS at 03:04

## 2018-01-01 RX ADMIN — PANTOPRAZOLE SODIUM 40 MG: 40 TABLET, DELAYED RELEASE ORAL at 07:06

## 2018-01-01 RX ADMIN — HEPARIN SODIUM 5000 UNITS: 5000 INJECTION, SOLUTION INTRAVENOUS; SUBCUTANEOUS at 09:04

## 2018-01-01 RX ADMIN — INSULIN ASPART 3 UNITS: 100 INJECTION, SOLUTION INTRAVENOUS; SUBCUTANEOUS at 08:04

## 2018-01-01 RX ADMIN — ESCITALOPRAM OXALATE 20 MG: 10 TABLET ORAL at 09:04

## 2018-01-01 RX ADMIN — ACETAMINOPHEN 650 MG: 325 TABLET ORAL at 02:05

## 2018-01-01 RX ADMIN — ACETAMINOPHEN 650 MG: 325 TABLET ORAL at 05:05

## 2018-01-01 RX ADMIN — INSULIN ASPART 3 UNITS: 100 INJECTION, SOLUTION INTRAVENOUS; SUBCUTANEOUS at 05:05

## 2018-01-01 RX ADMIN — MYCOPHENOLATE MOFETIL 250 MG: 250 CAPSULE ORAL at 08:06

## 2018-01-01 RX ADMIN — HYDROCORTISONE SODIUM SUCCINATE 100 MG: 100 INJECTION, POWDER, FOR SOLUTION INTRAMUSCULAR; INTRAVENOUS at 09:03

## 2018-01-01 RX ADMIN — LEVOTHYROXINE SODIUM 137 MCG: 25 TABLET ORAL at 06:05

## 2018-01-01 RX ADMIN — MYCOPHENOLATE MOFETIL 250 MG: 250 CAPSULE ORAL at 09:06

## 2018-01-01 RX ADMIN — MIDODRINE HYDROCHLORIDE 10 MG: 5 TABLET ORAL at 11:05

## 2018-01-01 RX ADMIN — INSULIN ASPART 2 UNITS: 100 INJECTION, SOLUTION INTRAVENOUS; SUBCUTANEOUS at 12:04

## 2018-01-01 RX ADMIN — DEXMEDETOMIDINE HYDROCHLORIDE 0.8 MCG/KG/HR: 100 INJECTION, SOLUTION, CONCENTRATE INTRAVENOUS at 05:03

## 2018-01-01 RX ADMIN — Medication 3 ML: at 03:02

## 2018-01-01 RX ADMIN — DOCUSATE SODIUM 100 MG: 50 LIQUID ORAL at 08:03

## 2018-01-01 RX ADMIN — SODIUM CHLORIDE: 0.9 INJECTION, SOLUTION INTRAVENOUS at 01:04

## 2018-01-01 RX ADMIN — PHENYLEPHRINE HYDROCHLORIDE 100 MCG: 10 INJECTION INTRAVENOUS at 03:03

## 2018-01-01 RX ADMIN — DEXTROSE MONOHYDRATE 25 G: 25 INJECTION, SOLUTION INTRAVENOUS at 06:03

## 2018-01-01 RX ADMIN — MIDODRINE HYDROCHLORIDE 10 MG: 5 TABLET ORAL at 08:06

## 2018-01-01 RX ADMIN — INSULIN ASPART 2 UNITS: 100 INJECTION, SOLUTION INTRAVENOUS; SUBCUTANEOUS at 06:04

## 2018-01-01 RX ADMIN — DIBASIC SODIUM PHOSPHATE, MONOBASIC POTASSIUM PHOSPHATE AND MONOBASIC SODIUM PHOSPHATE 2 TABLET: 852; 155; 130 TABLET ORAL at 11:03

## 2018-01-01 RX ADMIN — INSULIN ASPART 3 UNITS: 100 INJECTION, SOLUTION INTRAVENOUS; SUBCUTANEOUS at 04:02

## 2018-01-01 RX ADMIN — INSULIN ASPART 3 UNITS: 100 INJECTION, SOLUTION INTRAVENOUS; SUBCUTANEOUS at 06:05

## 2018-01-01 RX ADMIN — GABAPENTIN 100 MG: 100 CAPSULE ORAL at 08:06

## 2018-01-01 RX ADMIN — DEXTROSE MONOHYDRATE 25 G: 25 INJECTION, SOLUTION INTRAVENOUS at 06:04

## 2018-01-01 RX ADMIN — DOCUSATE SODIUM 100 MG: 100 CAPSULE, LIQUID FILLED ORAL at 09:05

## 2018-01-01 RX ADMIN — GUAIFENESIN 600 MG: 600 TABLET, EXTENDED RELEASE ORAL at 06:05

## 2018-01-01 RX ADMIN — POSACONAZOLE 200 MG: 40 SUSPENSION ORAL at 08:03

## 2018-01-01 RX ADMIN — TACROLIMUS 4 MG: 1 CAPSULE ORAL at 05:04

## 2018-01-01 RX ADMIN — INSULIN ASPART 1 UNITS: 100 INJECTION, SOLUTION INTRAVENOUS; SUBCUTANEOUS at 12:04

## 2018-01-01 RX ADMIN — PIPERACILLIN AND TAZOBACTAM 4.5 G: 4; .5 INJECTION, POWDER, LYOPHILIZED, FOR SOLUTION INTRAVENOUS; PARENTERAL at 02:03

## 2018-01-01 RX ADMIN — SENNOSIDES 5 ML: 8.8 SYRUP ORAL at 08:04

## 2018-01-01 RX ADMIN — INSULIN ASPART 1 UNITS: 100 INJECTION, SOLUTION INTRAVENOUS; SUBCUTANEOUS at 07:04

## 2018-01-01 RX ADMIN — POTASSIUM CHLORIDE 20 MEQ: 20 SOLUTION ORAL at 05:03

## 2018-01-01 RX ADMIN — PIPERACILLIN AND TAZOBACTAM 4.5 G: 4; .5 INJECTION, POWDER, LYOPHILIZED, FOR SOLUTION INTRAVENOUS; PARENTERAL at 09:03

## 2018-01-01 RX ADMIN — TACROLIMUS 1 MG: 1 CAPSULE ORAL at 12:05

## 2018-01-01 RX ADMIN — INSULIN ASPART 2 UNITS: 100 INJECTION, SOLUTION INTRAVENOUS; SUBCUTANEOUS at 12:06

## 2018-01-01 RX ADMIN — ACETAMINOPHEN 999.01 MG: 650 SOLUTION ORAL at 09:04

## 2018-01-01 RX ADMIN — INSULIN ASPART 2 UNITS: 100 INJECTION, SOLUTION INTRAVENOUS; SUBCUTANEOUS at 02:04

## 2018-01-01 RX ADMIN — INSULIN ASPART 2 UNITS: 100 INJECTION, SOLUTION INTRAVENOUS; SUBCUTANEOUS at 06:05

## 2018-01-01 RX ADMIN — MIDODRINE HYDROCHLORIDE 10 MG: 5 TABLET ORAL at 12:06

## 2018-01-01 RX ADMIN — CEFEPIME 1 G: 1 INJECTION, POWDER, FOR SOLUTION INTRAMUSCULAR; INTRAVENOUS at 07:06

## 2018-01-01 RX ADMIN — HEPARIN SODIUM 5000 UNITS: 5000 INJECTION, SOLUTION INTRAVENOUS; SUBCUTANEOUS at 08:05

## 2018-01-01 RX ADMIN — PANTOPRAZOLE SODIUM 40 MG: 40 TABLET, DELAYED RELEASE ORAL at 08:02

## 2018-01-01 RX ADMIN — ACETAMINOPHEN 650 MG: 325 TABLET ORAL at 01:05

## 2018-01-01 RX ADMIN — CEFEPIME 1 G: 1 INJECTION, POWDER, FOR SOLUTION INTRAMUSCULAR; INTRAVENOUS at 08:06

## 2018-01-01 RX ADMIN — Medication 200 MCG: at 10:03

## 2018-01-01 RX ADMIN — ESCITALOPRAM OXALATE 20 MG: 10 TABLET ORAL at 08:03

## 2018-01-01 RX ADMIN — CHLORHEXIDINE GLUCONATE 15 ML: 1.2 RINSE ORAL at 08:04

## 2018-01-01 RX ADMIN — LEVOTHYROXINE SODIUM ANHYDROUS 75 MCG: 100 INJECTION, POWDER, LYOPHILIZED, FOR SOLUTION INTRAVENOUS at 09:04

## 2018-01-01 RX ADMIN — IPRATROPIUM BROMIDE AND ALBUTEROL SULFATE 3 ML: .5; 3 SOLUTION RESPIRATORY (INHALATION) at 07:06

## 2018-01-01 RX ADMIN — HEPARIN SODIUM 5000 UNITS: 5000 INJECTION, SOLUTION INTRAVENOUS; SUBCUTANEOUS at 09:03

## 2018-01-01 RX ADMIN — NITROGLYCERIN 0.5 INCH: 20 OINTMENT TOPICAL at 08:04

## 2018-01-01 RX ADMIN — MIDAZOLAM HYDROCHLORIDE 10 MG/HR: 5 INJECTION, SOLUTION INTRAMUSCULAR; INTRAVENOUS at 11:03

## 2018-01-01 RX ADMIN — FAMOTIDINE 20 MG: 10 INJECTION INTRAVENOUS at 08:04

## 2018-01-01 RX ADMIN — INSULIN ASPART 1 UNITS: 100 INJECTION, SOLUTION INTRAVENOUS; SUBCUTANEOUS at 09:04

## 2018-01-01 RX ADMIN — MEROPENEM 2 G: 1 INJECTION, POWDER, FOR SOLUTION INTRAVENOUS at 06:04

## 2018-01-01 RX ADMIN — Medication 250 MG: at 09:06

## 2018-01-01 RX ADMIN — INSULIN ASPART 5 UNITS: 100 INJECTION, SOLUTION INTRAVENOUS; SUBCUTANEOUS at 05:05

## 2018-01-01 RX ADMIN — INSULIN ASPART 4 UNITS: 100 INJECTION, SOLUTION INTRAVENOUS; SUBCUTANEOUS at 04:04

## 2018-01-01 RX ADMIN — Medication 50 MCG/HR: at 08:03

## 2018-01-01 RX ADMIN — LINEZOLID 600 MG: 600 INJECTION, SOLUTION INTRAVENOUS at 04:03

## 2018-01-01 RX ADMIN — ACETAMINOPHEN 650 MG: 325 TABLET ORAL at 01:03

## 2018-01-01 RX ADMIN — POSACONAZOLE 200 MG: 40 SUSPENSION ORAL at 09:03

## 2018-01-01 RX ADMIN — INSULIN DETEMIR 5 UNITS: 100 INJECTION, SOLUTION SUBCUTANEOUS at 09:03

## 2018-01-01 RX ADMIN — GABAPENTIN 900 MG: 300 CAPSULE ORAL at 02:02

## 2018-01-01 RX ADMIN — INSULIN ASPART 1 UNITS: 100 INJECTION, SOLUTION INTRAVENOUS; SUBCUTANEOUS at 11:05

## 2018-01-01 RX ADMIN — MYCOPHENOLATE MOFETIL 250 MG: 250 CAPSULE ORAL at 08:05

## 2018-01-01 RX ADMIN — IPRATROPIUM BROMIDE AND ALBUTEROL SULFATE 3 ML: .5; 3 SOLUTION RESPIRATORY (INHALATION) at 03:06

## 2018-01-01 RX ADMIN — MIDODRINE HYDROCHLORIDE 10 MG: 5 TABLET ORAL at 05:05

## 2018-01-01 RX ADMIN — TACROLIMUS 5 MG: 5 CAPSULE ORAL at 09:06

## 2018-01-01 RX ADMIN — SODIUM CHLORIDE 1000 ML: 0.9 INJECTION, SOLUTION INTRAVENOUS at 08:05

## 2018-01-01 RX ADMIN — GABAPENTIN 100 MG: 100 CAPSULE ORAL at 09:05

## 2018-01-01 RX ADMIN — FAMOTIDINE 20 MG: 20 TABLET, FILM COATED ORAL at 08:04

## 2018-01-01 RX ADMIN — INSULIN ASPART 2 UNITS: 100 INJECTION, SOLUTION INTRAVENOUS; SUBCUTANEOUS at 08:04

## 2018-01-01 RX ADMIN — LEVALBUTEROL HYDROCHLORIDE 0.63 MG: 0.63 SOLUTION RESPIRATORY (INHALATION) at 08:05

## 2018-01-01 RX ADMIN — HEPARIN SODIUM 5000 UNITS: 5000 INJECTION, SOLUTION INTRAVENOUS; SUBCUTANEOUS at 03:02

## 2018-01-01 RX ADMIN — TACROLIMUS 0.5 MG: 0.5 CAPSULE ORAL at 05:03

## 2018-01-01 RX ADMIN — FAMOTIDINE 20 MG: 10 INJECTION, SOLUTION INTRAVENOUS at 08:03

## 2018-01-01 RX ADMIN — GUAIFENESIN 600 MG: 600 TABLET, EXTENDED RELEASE ORAL at 08:04

## 2018-01-01 RX ADMIN — DIBASIC SODIUM PHOSPHATE, MONOBASIC POTASSIUM PHOSPHATE AND MONOBASIC SODIUM PHOSPHATE 2 TABLET: 852; 155; 130 TABLET ORAL at 04:03

## 2018-01-01 RX ADMIN — Medication 3 ML: at 02:04

## 2018-01-01 RX ADMIN — PREDNISONE 5 MG: 5 TABLET ORAL at 08:05

## 2018-01-01 RX ADMIN — MIDODRINE HYDROCHLORIDE 10 MG: 5 TABLET ORAL at 08:04

## 2018-01-01 RX ADMIN — POTASSIUM PHOSPHATE, MONOBASIC AND POTASSIUM PHOSPHATE, DIBASIC 30 MMOL: 224; 236 INJECTION, SOLUTION, CONCENTRATE INTRAVENOUS at 10:03

## 2018-01-01 RX ADMIN — SODIUM CHLORIDE 0.5 UNITS/HR: 9 INJECTION, SOLUTION INTRAVENOUS at 10:04

## 2018-01-01 RX ADMIN — ESCITALOPRAM 20 MG: 5 TABLET, FILM COATED ORAL at 09:06

## 2018-01-01 RX ADMIN — PREDNISONE 2.5 MG: 2.5 TABLET ORAL at 07:06

## 2018-01-01 RX ADMIN — SODIUM PHOSPHATE, MONOBASIC, MONOHYDRATE 30 MMOL: 276; 142 INJECTION, SOLUTION INTRAVENOUS at 04:06

## 2018-01-01 RX ADMIN — HEPARIN SODIUM 5000 UNITS: 5000 INJECTION, SOLUTION INTRAVENOUS; SUBCUTANEOUS at 09:06

## 2018-01-01 RX ADMIN — ACETAMINOPHEN 999.01 MG: 650 SOLUTION ORAL at 08:04

## 2018-01-01 RX ADMIN — MAGNESIUM SULFATE IN WATER 2 G: 40 INJECTION, SOLUTION INTRAVENOUS at 03:06

## 2018-01-01 RX ADMIN — ASPIRIN 81 MG: 81 TABLET, COATED ORAL at 08:03

## 2018-01-01 RX ADMIN — Medication 2500 MCG: at 05:03

## 2018-01-01 RX ADMIN — SODIUM CHLORIDE: 0.9 INJECTION, SOLUTION INTRAVENOUS at 08:03

## 2018-01-01 RX ADMIN — Medication 3 ML: at 05:03

## 2018-01-01 RX ADMIN — SODIUM CHLORIDE: 0.9 INJECTION, SOLUTION INTRAVENOUS at 05:04

## 2018-01-01 RX ADMIN — INSULIN ASPART 8 UNITS: 100 INJECTION, SOLUTION INTRAVENOUS; SUBCUTANEOUS at 07:01

## 2018-01-01 RX ADMIN — SODIUM CHLORIDE 0.9 UNITS/HR: 9 INJECTION, SOLUTION INTRAVENOUS at 11:04

## 2018-01-01 RX ADMIN — INSULIN ASPART 4 UNITS: 100 INJECTION, SOLUTION INTRAVENOUS; SUBCUTANEOUS at 12:06

## 2018-01-01 RX ADMIN — PRAVASTATIN SODIUM 40 MG: 40 TABLET ORAL at 08:06

## 2018-01-01 RX ADMIN — TACROLIMUS 5 MG: 5 CAPSULE ORAL at 05:06

## 2018-01-01 RX ADMIN — SODIUM CHLORIDE: 0.9 INJECTION, SOLUTION INTRAVENOUS at 03:06

## 2018-01-01 RX ADMIN — CETIRIZINE HYDROCHLORIDE 5 MG: 5 TABLET, FILM COATED ORAL at 08:06

## 2018-01-01 RX ADMIN — ALPRAZOLAM 0.5 MG: 0.5 TABLET ORAL at 07:04

## 2018-01-01 RX ADMIN — METOCLOPRAMIDE 5 MG: 5 INJECTION, SOLUTION INTRAMUSCULAR; INTRAVENOUS at 11:04

## 2018-01-01 RX ADMIN — INSULIN ASPART 3 UNITS: 100 INJECTION, SOLUTION INTRAVENOUS; SUBCUTANEOUS at 08:02

## 2018-01-01 RX ADMIN — LEVOTHYROXINE SODIUM 137 MCG: 25 TABLET ORAL at 10:04

## 2018-01-01 RX ADMIN — ESCITALOPRAM OXALATE 20 MG: 10 TABLET ORAL at 08:05

## 2018-01-01 RX ADMIN — SODIUM CHLORIDE 300 ML: 9 INJECTION, SOLUTION INTRAVENOUS at 09:05

## 2018-01-01 RX ADMIN — HYDROCORTISONE SODIUM SUCCINATE 100 MG: 100 INJECTION, POWDER, FOR SOLUTION INTRAMUSCULAR; INTRAVENOUS at 01:03

## 2018-01-01 RX ADMIN — Medication 3 ML: at 06:03

## 2018-01-01 RX ADMIN — Medication 3 ML: at 10:03

## 2018-01-01 RX ADMIN — ONDANSETRON 4 MG: 4 TABLET, ORALLY DISINTEGRATING ORAL at 06:05

## 2018-01-01 RX ADMIN — INSULIN ASPART 1 UNITS: 100 INJECTION, SOLUTION INTRAVENOUS; SUBCUTANEOUS at 03:04

## 2018-01-01 RX ADMIN — INSULIN ASPART 4 UNITS: 100 INJECTION, SOLUTION INTRAVENOUS; SUBCUTANEOUS at 09:05

## 2018-01-01 RX ADMIN — NORTRIPTYLINE HYDROCHLORIDE 10 MG: 10 CAPSULE ORAL at 08:04

## 2018-01-01 RX ADMIN — TACROLIMUS 2 MG: 1 CAPSULE ORAL at 07:04

## 2018-01-01 RX ADMIN — OXYCODONE HYDROCHLORIDE 10 MG: 5 SOLUTION ORAL at 09:04

## 2018-01-01 RX ADMIN — VASOPRESSIN 0.04 UNITS/MIN: 20 INJECTION INTRAVENOUS at 01:03

## 2018-01-01 RX ADMIN — FAMOTIDINE 20 MG: 10 INJECTION INTRAVENOUS at 08:03

## 2018-01-01 RX ADMIN — GUAIFENESIN 600 MG: 600 TABLET, EXTENDED RELEASE ORAL at 04:05

## 2018-01-01 RX ADMIN — MYCOPHENILIC ACID 720 MG: 180 TABLET, DELAYED RELEASE ORAL at 09:03

## 2018-01-01 RX ADMIN — QUETIAPINE FUMARATE 50 MG: 25 TABLET, FILM COATED ORAL at 09:04

## 2018-01-01 RX ADMIN — ESCITALOPRAM 20 MG: 5 TABLET, FILM COATED ORAL at 05:06

## 2018-01-01 RX ADMIN — ESCITALOPRAM OXALATE 10 MG: 10 TABLET ORAL at 02:04

## 2018-01-01 RX ADMIN — LEVOTHYROXINE SODIUM 137 MCG: 25 TABLET ORAL at 08:04

## 2018-01-01 RX ADMIN — INSULIN ASPART 3 UNITS: 100 INJECTION, SOLUTION INTRAVENOUS; SUBCUTANEOUS at 12:04

## 2018-01-01 RX ADMIN — ONDANSETRON 8 MG: 8 TABLET, ORALLY DISINTEGRATING ORAL at 03:02

## 2018-01-01 RX ADMIN — SODIUM CHLORIDE 0.6 UNITS/HR: 9 INJECTION, SOLUTION INTRAVENOUS at 10:05

## 2018-01-01 RX ADMIN — GUAIFENESIN 600 MG: 600 TABLET, EXTENDED RELEASE ORAL at 08:05

## 2018-01-01 RX ADMIN — CETIRIZINE HYDROCHLORIDE 5 MG: 5 TABLET, FILM COATED ORAL at 05:04

## 2018-01-01 RX ADMIN — INSULIN ASPART 3 UNITS: 100 INJECTION, SOLUTION INTRAVENOUS; SUBCUTANEOUS at 06:02

## 2018-01-01 RX ADMIN — ALPRAZOLAM 0.5 MG: 0.5 TABLET ORAL at 10:05

## 2018-01-01 RX ADMIN — INSULIN ASPART 2 UNITS: 100 INJECTION, SOLUTION INTRAVENOUS; SUBCUTANEOUS at 09:05

## 2018-01-01 RX ADMIN — INSULIN ASPART 2 UNITS: 100 INJECTION, SOLUTION INTRAVENOUS; SUBCUTANEOUS at 02:05

## 2018-01-01 RX ADMIN — LEVOTHYROXINE SODIUM 125 MCG: 75 TABLET ORAL at 06:03

## 2018-01-01 RX ADMIN — OXYCODONE HYDROCHLORIDE 10 MG: 5 SOLUTION ORAL at 06:04

## 2018-01-01 RX ADMIN — MAGNESIUM SULFATE IN WATER 2 G: 40 INJECTION, SOLUTION INTRAVENOUS at 01:06

## 2018-01-01 RX ADMIN — TACROLIMUS 3 MG: 1 CAPSULE ORAL at 06:05

## 2018-01-01 RX ADMIN — CEFEPIME 1 G: 1 INJECTION, POWDER, FOR SOLUTION INTRAMUSCULAR; INTRAVENOUS at 06:06

## 2018-01-01 RX ADMIN — TACROLIMUS 4 MG: 1 CAPSULE ORAL at 05:05

## 2018-01-01 RX ADMIN — Medication 3 ML: at 02:03

## 2018-01-01 RX ADMIN — CEFEPIME 1 G: 1 INJECTION, POWDER, FOR SOLUTION INTRAMUSCULAR; INTRAVENOUS at 03:06

## 2018-01-01 RX ADMIN — TACROLIMUS 0.5 MG: 0.5 CAPSULE ORAL at 08:04

## 2018-01-01 RX ADMIN — ONDANSETRON 4 MG: 4 TABLET, ORALLY DISINTEGRATING ORAL at 12:04

## 2018-01-01 RX ADMIN — INSULIN ASPART 3 UNITS: 100 INJECTION, SOLUTION INTRAVENOUS; SUBCUTANEOUS at 04:04

## 2018-01-01 RX ADMIN — CETIRIZINE HYDROCHLORIDE 5 MG: 5 TABLET, FILM COATED ORAL at 08:04

## 2018-01-01 RX ADMIN — INSULIN ASPART 1 UNITS: 100 INJECTION, SOLUTION INTRAVENOUS; SUBCUTANEOUS at 10:05

## 2018-01-01 RX ADMIN — QUETIAPINE FUMARATE 50 MG: 25 TABLET, FILM COATED ORAL at 09:05

## 2018-01-01 RX ADMIN — IPRATROPIUM BROMIDE AND ALBUTEROL SULFATE 3 ML: .5; 3 SOLUTION RESPIRATORY (INHALATION) at 12:05

## 2018-01-01 RX ADMIN — Medication 2 MG: at 06:04

## 2018-01-01 RX ADMIN — INSULIN ASPART 4 UNITS: 100 INJECTION, SOLUTION INTRAVENOUS; SUBCUTANEOUS at 05:04

## 2018-01-01 RX ADMIN — INSULIN ASPART 5 UNITS: 100 INJECTION, SOLUTION INTRAVENOUS; SUBCUTANEOUS at 12:05

## 2018-01-01 RX ADMIN — ALPRAZOLAM 0.5 MG: 0.5 TABLET ORAL at 11:05

## 2018-01-01 RX ADMIN — SODIUM CHLORIDE: 0.9 INJECTION, SOLUTION INTRAVENOUS at 04:04

## 2018-01-01 RX ADMIN — PROPOFOL 50 MCG/KG/MIN: 10 INJECTION, EMULSION INTRAVENOUS at 11:03

## 2018-01-01 RX ADMIN — IPRATROPIUM BROMIDE AND ALBUTEROL SULFATE 3 ML: .5; 3 SOLUTION RESPIRATORY (INHALATION) at 01:04

## 2018-01-01 RX ADMIN — ALPRAZOLAM 0.5 MG: 0.5 TABLET ORAL at 09:04

## 2018-01-01 RX ADMIN — IPRATROPIUM BROMIDE AND ALBUTEROL SULFATE 3 ML: .5; 3 SOLUTION RESPIRATORY (INHALATION) at 11:06

## 2018-01-01 RX ADMIN — POSACONAZOLE 200 MG: 40 SUSPENSION ORAL at 10:03

## 2018-01-01 RX ADMIN — MIDODRINE HYDROCHLORIDE 10 MG: 5 TABLET ORAL at 09:04

## 2018-01-01 RX ADMIN — SODIUM CHLORIDE: 0.9 INJECTION, SOLUTION INTRAVENOUS at 02:03

## 2018-01-01 RX ADMIN — CETIRIZINE HYDROCHLORIDE 5 MG: 5 TABLET, FILM COATED ORAL at 09:06

## 2018-01-01 RX ADMIN — SODIUM PHOSPHATE, MONOBASIC, MONOHYDRATE 30 MMOL: 276; 142 INJECTION, SOLUTION INTRAVENOUS at 07:06

## 2018-01-01 RX ADMIN — GABAPENTIN 100 MG: 100 CAPSULE ORAL at 02:06

## 2018-01-01 RX ADMIN — MYCOPHENILIC ACID 720 MG: 180 TABLET, DELAYED RELEASE ORAL at 09:02

## 2018-01-01 RX ADMIN — GUAIFENESIN 1200 MG: 600 TABLET, EXTENDED RELEASE ORAL at 08:05

## 2018-01-01 RX ADMIN — ASPIRIN 81 MG: 81 TABLET, COATED ORAL at 08:02

## 2018-01-01 RX ADMIN — MIDODRINE HYDROCHLORIDE 10 MG: 5 TABLET ORAL at 04:05

## 2018-01-01 RX ADMIN — LEVOTHYROXINE SODIUM 75 MCG: 25 TABLET ORAL at 06:04

## 2018-01-01 RX ADMIN — LEVOTHYROXINE SODIUM 137 MCG: 25 TABLET ORAL at 06:06

## 2018-01-01 RX ADMIN — INSULIN DETEMIR 10 UNITS: 100 INJECTION, SOLUTION SUBCUTANEOUS at 09:02

## 2018-01-01 RX ADMIN — QUETIAPINE FUMARATE 50 MG: 25 TABLET, FILM COATED ORAL at 08:05

## 2018-01-01 RX ADMIN — SODIUM CHLORIDE 300 ML: 0.9 INJECTION, SOLUTION INTRAVENOUS at 12:06

## 2018-01-01 RX ADMIN — QUETIAPINE FUMARATE 25 MG: 25 TABLET, FILM COATED ORAL at 10:04

## 2018-01-01 RX ADMIN — MIDODRINE HYDROCHLORIDE 2.5 MG: 2.5 TABLET ORAL at 09:04

## 2018-01-01 RX ADMIN — INSULIN ASPART 1 UNITS: 100 INJECTION, SOLUTION INTRAVENOUS; SUBCUTANEOUS at 09:03

## 2018-01-01 RX ADMIN — INSULIN ASPART 3 UNITS: 100 INJECTION, SOLUTION INTRAVENOUS; SUBCUTANEOUS at 05:04

## 2018-01-01 RX ADMIN — ESCITALOPRAM OXALATE 20 MG: 10 TABLET ORAL at 08:04

## 2018-01-01 RX ADMIN — ONDANSETRON 8 MG: 8 TABLET, ORALLY DISINTEGRATING ORAL at 09:05

## 2018-01-01 RX ADMIN — Medication 1 MG: at 10:04

## 2018-01-01 RX ADMIN — PIPERACILLIN AND TAZOBACTAM 4.5 G: 4; .5 INJECTION, POWDER, LYOPHILIZED, FOR SOLUTION INTRAVENOUS; PARENTERAL at 05:03

## 2018-01-01 RX ADMIN — OXYCODONE HYDROCHLORIDE 5 MG: 5 TABLET ORAL at 02:05

## 2018-01-01 RX ADMIN — IPRATROPIUM BROMIDE AND ALBUTEROL SULFATE 3 ML: .5; 3 SOLUTION RESPIRATORY (INHALATION) at 04:06

## 2018-01-01 RX ADMIN — NOREPINEPHRINE BITARTRATE 0.23 MCG/KG/MIN: 1 INJECTION, SOLUTION, CONCENTRATE INTRAVENOUS at 12:03

## 2018-01-01 RX ADMIN — TACROLIMUS 2 MG: 1 CAPSULE ORAL at 05:04

## 2018-01-01 RX ADMIN — CETIRIZINE HYDROCHLORIDE 5 MG: 5 TABLET, FILM COATED ORAL at 04:05

## 2018-01-01 RX ADMIN — INSULIN ASPART 2 UNITS: 100 INJECTION, SOLUTION INTRAVENOUS; SUBCUTANEOUS at 07:05

## 2018-01-01 RX ADMIN — POLYETHYLENE GLYCOL 3350 17 G: 17 POWDER, FOR SOLUTION ORAL at 08:04

## 2018-01-01 RX ADMIN — FENTANYL CITRATE 50 MCG: 50 INJECTION, SOLUTION INTRAMUSCULAR; INTRAVENOUS at 02:03

## 2018-01-01 RX ADMIN — HEPARIN SODIUM 5000 UNITS: 5000 INJECTION, SOLUTION INTRAVENOUS; SUBCUTANEOUS at 05:04

## 2018-01-01 RX ADMIN — HEPARIN SODIUM 5000 UNITS: 5000 INJECTION, SOLUTION INTRAVENOUS; SUBCUTANEOUS at 05:05

## 2018-01-01 RX ADMIN — NOREPINEPHRINE BITARTRATE 0.3 MCG/KG/MIN: 1 INJECTION INTRAVENOUS at 08:06

## 2018-01-01 RX ADMIN — Medication 250 MG: at 10:06

## 2018-01-01 RX ADMIN — GUAIFENESIN 600 MG: 600 TABLET, EXTENDED RELEASE ORAL at 11:05

## 2018-01-01 RX ADMIN — Medication 0.16 MCG/KG/MIN: at 12:06

## 2018-01-01 RX ADMIN — FAMOTIDINE 20 MG: 20 TABLET, FILM COATED ORAL at 09:04

## 2018-01-01 RX ADMIN — INSULIN ASPART 3 UNITS: 100 INJECTION, SOLUTION INTRAVENOUS; SUBCUTANEOUS at 12:05

## 2018-01-01 RX ADMIN — ONDANSETRON 8 MG: 8 TABLET, ORALLY DISINTEGRATING ORAL at 02:03

## 2018-01-01 RX ADMIN — LEVOTHYROXINE SODIUM 125 MCG: 75 TABLET ORAL at 05:03

## 2018-01-01 RX ADMIN — CHLORHEXIDINE GLUCONATE 15 ML: 1.2 RINSE ORAL at 08:03

## 2018-01-01 RX ADMIN — ACETAMINOPHEN 650 MG: 325 TABLET ORAL at 03:05

## 2018-01-01 RX ADMIN — TACROLIMUS 1 MG: 1 CAPSULE ORAL at 10:04

## 2018-01-01 RX ADMIN — Medication 3 MG: at 05:04

## 2018-01-01 RX ADMIN — INSULIN ASPART 4 UNITS: 100 INJECTION, SOLUTION INTRAVENOUS; SUBCUTANEOUS at 12:05

## 2018-01-01 RX ADMIN — HYDROCORTISONE SODIUM SUCCINATE 50 MG: 100 INJECTION, POWDER, FOR SOLUTION INTRAMUSCULAR; INTRAVENOUS at 06:03

## 2018-01-01 RX ADMIN — TACROLIMUS 5 MG: 5 CAPSULE ORAL at 08:06

## 2018-01-01 RX ADMIN — INSULIN ASPART 4 UNITS: 100 INJECTION, SOLUTION INTRAVENOUS; SUBCUTANEOUS at 01:03

## 2018-01-01 RX ADMIN — INSULIN DETEMIR 15 UNITS: 100 INJECTION, SOLUTION SUBCUTANEOUS at 09:02

## 2018-01-01 RX ADMIN — FAMOTIDINE 20 MG: 10 INJECTION, SOLUTION INTRAVENOUS at 09:03

## 2018-01-01 RX ADMIN — ROCURONIUM BROMIDE 50 MG: 10 INJECTION, SOLUTION INTRAVENOUS at 09:03

## 2018-01-01 RX ADMIN — CETIRIZINE HYDROCHLORIDE 5 MG: 5 TABLET, FILM COATED ORAL at 09:05

## 2018-01-01 RX ADMIN — ESCITALOPRAM 20 MG: 5 TABLET, FILM COATED ORAL at 05:02

## 2018-01-01 RX ADMIN — ALPRAZOLAM 0.5 MG: 0.5 TABLET ORAL at 09:05

## 2018-01-01 RX ADMIN — CALCIUM GLUCONATE 1 G: 94 INJECTION, SOLUTION INTRAVENOUS at 06:03

## 2018-01-01 RX ADMIN — Medication 25 MG: at 08:01

## 2018-01-01 RX ADMIN — POLYETHYLENE GLYCOL 3350 17 G: 17 POWDER, FOR SOLUTION ORAL at 01:04

## 2018-01-01 RX ADMIN — LEVALBUTEROL HYDROCHLORIDE 0.63 MG: 0.63 SOLUTION RESPIRATORY (INHALATION) at 03:05

## 2018-01-01 RX ADMIN — Medication 3 ML: at 09:03

## 2018-01-01 RX ADMIN — MOXIFLOXACIN HYDROCHLORIDE 400 MG: 400 TABLET, FILM COATED ORAL at 08:02

## 2018-01-01 RX ADMIN — POLYETHYLENE GLYCOL 3350 17 G: 17 POWDER, FOR SOLUTION ORAL at 10:03

## 2018-01-01 RX ADMIN — Medication 100 MCG/HR: at 07:03

## 2018-01-01 RX ADMIN — LEVOTHYROXINE SODIUM 137 MCG: 25 TABLET ORAL at 09:04

## 2018-01-01 RX ADMIN — POLYETHYLENE GLYCOL 3350 17 G: 17 POWDER, FOR SOLUTION ORAL at 09:03

## 2018-01-01 RX ADMIN — NORTRIPTYLINE HYDROCHLORIDE 25 MG: 25 CAPSULE ORAL at 09:01

## 2018-01-01 RX ADMIN — ACETAMINOPHEN 999.01 MG: 650 SOLUTION ORAL at 07:04

## 2018-01-01 RX ADMIN — HEPARIN SODIUM 5000 UNITS: 5000 INJECTION, SOLUTION INTRAVENOUS; SUBCUTANEOUS at 08:03

## 2018-01-01 RX ADMIN — MEROPENEM 2 G: 1 INJECTION, POWDER, FOR SOLUTION INTRAVENOUS at 04:03

## 2018-01-01 RX ADMIN — LEVALBUTEROL HYDROCHLORIDE 0.63 MG: 0.63 SOLUTION RESPIRATORY (INHALATION) at 07:06

## 2018-01-01 RX ADMIN — OXYCODONE HYDROCHLORIDE 5 MG: 5 SOLUTION ORAL at 09:04

## 2018-01-01 RX ADMIN — LEVOTHYROXINE SODIUM ANHYDROUS 75 MCG: 100 INJECTION, POWDER, LYOPHILIZED, FOR SOLUTION INTRAVENOUS at 10:04

## 2018-01-01 RX ADMIN — IPRATROPIUM BROMIDE AND ALBUTEROL SULFATE 3 ML: .5; 3 SOLUTION RESPIRATORY (INHALATION) at 10:05

## 2018-01-01 RX ADMIN — LEVOTHYROXINE SODIUM 137 MCG: 25 TABLET ORAL at 05:05

## 2018-01-01 RX ADMIN — INSULIN HUMAN 5 UNITS: 100 INJECTION, SOLUTION PARENTERAL at 04:04

## 2018-01-01 RX ADMIN — CHLORHEXIDINE GLUCONATE 15 ML: 1.2 RINSE ORAL at 09:03

## 2018-01-01 RX ADMIN — MIDODRINE HYDROCHLORIDE 2.5 MG: 2.5 TABLET ORAL at 03:04

## 2018-01-01 RX ADMIN — DEXTROSE 300 MG: 5 SOLUTION INTRAVENOUS at 09:03

## 2018-01-01 RX ADMIN — MIDODRINE HYDROCHLORIDE 10 MG: 5 TABLET ORAL at 05:06

## 2018-01-01 RX ADMIN — LIDOCAINE HYDROCHLORIDE 1 ML: 10 INJECTION, SOLUTION INFILTRATION; PERINEURAL at 04:03

## 2018-01-01 RX ADMIN — PANTOPRAZOLE SODIUM 40 MG: 40 TABLET, DELAYED RELEASE ORAL at 06:06

## 2018-01-01 RX ADMIN — ESCITALOPRAM OXALATE 20 MG: 10 TABLET ORAL at 12:05

## 2018-01-01 RX ADMIN — INSULIN ASPART 5 UNITS: 100 INJECTION, SOLUTION INTRAVENOUS; SUBCUTANEOUS at 09:05

## 2018-01-01 RX ADMIN — MIDODRINE HYDROCHLORIDE 10 MG: 5 TABLET ORAL at 07:05

## 2018-01-01 RX ADMIN — MOXIFLOXACIN HYDROCHLORIDE 400 MG: 400 TABLET, FILM COATED ORAL at 12:05

## 2018-01-01 RX ADMIN — INSULIN ASPART 1 UNITS: 100 INJECTION, SOLUTION INTRAVENOUS; SUBCUTANEOUS at 05:04

## 2018-01-01 RX ADMIN — ACETAMINOPHEN 999.01 MG: 650 SOLUTION ORAL at 04:04

## 2018-01-01 RX ADMIN — DIBASIC SODIUM PHOSPHATE, MONOBASIC POTASSIUM PHOSPHATE AND MONOBASIC SODIUM PHOSPHATE 2 TABLET: 852; 155; 130 TABLET ORAL at 09:03

## 2018-01-01 RX ADMIN — CETIRIZINE HYDROCHLORIDE 5 MG: 5 TABLET, FILM COATED ORAL at 02:06

## 2018-01-01 RX ADMIN — MYCOPHENOLATE MOFETIL 250 MG: 250 CAPSULE ORAL at 09:05

## 2018-01-01 RX ADMIN — MIDODRINE HYDROCHLORIDE 10 MG: 5 TABLET ORAL at 03:04

## 2018-01-01 RX ADMIN — HEPARIN SODIUM 1000 UNITS: 1000 INJECTION, SOLUTION INTRAVENOUS; SUBCUTANEOUS at 12:04

## 2018-01-01 RX ADMIN — FENTANYL CITRATE 50 MCG: 50 INJECTION, SOLUTION INTRAMUSCULAR; INTRAVENOUS at 12:03

## 2018-01-01 RX ADMIN — GUAIFENESIN 1200 MG: 600 TABLET, EXTENDED RELEASE ORAL at 12:05

## 2018-01-01 RX ADMIN — CEFEPIME 1 G: 1 INJECTION, POWDER, FOR SOLUTION INTRAMUSCULAR; INTRAVENOUS at 06:05

## 2018-01-01 RX ADMIN — TORSEMIDE 40 MG: 20 TABLET ORAL at 08:02

## 2018-01-01 RX ADMIN — LINEZOLID 600 MG: 600 INJECTION, SOLUTION INTRAVENOUS at 05:03

## 2018-01-01 RX ADMIN — MAGNESIUM OXIDE TAB 400 MG (241.3 MG ELEMENTAL MG) 400 MG: 400 (241.3 MG) TAB at 08:06

## 2018-01-01 RX ADMIN — ENOXAPARIN SODIUM 30 MG: 100 INJECTION SUBCUTANEOUS at 06:05

## 2018-01-01 RX ADMIN — INSULIN ASPART 3 UNITS: 100 INJECTION, SOLUTION INTRAVENOUS; SUBCUTANEOUS at 03:03

## 2018-01-01 RX ADMIN — NOREPINEPHRINE BITARTRATE 0.35 MCG/KG/MIN: 1 INJECTION, SOLUTION, CONCENTRATE INTRAVENOUS at 02:03

## 2018-01-01 RX ADMIN — ERYTHROPOIETIN 7700 UNITS: 10000 INJECTION, SOLUTION INTRAVENOUS; SUBCUTANEOUS at 03:06

## 2018-01-01 RX ADMIN — DEXTROSE MONOHYDRATE 12.5 ML: 25 INJECTION, SOLUTION INTRAVENOUS at 08:04

## 2018-01-01 RX ADMIN — VASOPRESSIN 1 UNITS: 20 INJECTION INTRAVENOUS at 10:04

## 2018-01-01 RX ADMIN — INSULIN ASPART 2 UNITS: 100 INJECTION, SOLUTION INTRAVENOUS; SUBCUTANEOUS at 04:04

## 2018-01-01 RX ADMIN — ERYTHROPOIETIN 4000 UNITS: 4000 INJECTION, SOLUTION INTRAVENOUS; SUBCUTANEOUS at 04:04

## 2018-01-01 RX ADMIN — ERYTHROPOIETIN 8100 UNITS: 10000 INJECTION, SOLUTION INTRAVENOUS; SUBCUTANEOUS at 11:05

## 2018-01-01 RX ADMIN — ACETAMINOPHEN 650 MG: 325 TABLET ORAL at 08:05

## 2018-01-01 RX ADMIN — TAMSULOSIN HYDROCHLORIDE 0.8 MG: 0.4 CAPSULE ORAL at 09:02

## 2018-01-01 RX ADMIN — INSULIN DETEMIR 8 UNITS: 100 INJECTION, SOLUTION SUBCUTANEOUS at 09:06

## 2018-01-01 RX ADMIN — SODIUM PHOSPHATE, MONOBASIC, MONOHYDRATE 39.99 MMOL: 276; 142 INJECTION, SOLUTION INTRAVENOUS at 05:04

## 2018-01-01 RX ADMIN — LEVOTHYROXINE SODIUM ANHYDROUS 88 MCG: 100 INJECTION, POWDER, LYOPHILIZED, FOR SOLUTION INTRAVENOUS at 08:04

## 2018-01-01 RX ADMIN — QUETIAPINE FUMARATE 50 MG: 25 TABLET ORAL at 09:06

## 2018-01-01 RX ADMIN — GABAPENTIN 100 MG: 100 CAPSULE ORAL at 04:05

## 2018-01-01 RX ADMIN — CEFEPIME 1 G: 1 INJECTION, POWDER, FOR SOLUTION INTRAMUSCULAR; INTRAVENOUS at 05:06

## 2018-01-01 RX ADMIN — Medication 3 ML: at 01:04

## 2018-01-01 RX ADMIN — MIDODRINE HYDROCHLORIDE 10 MG: 5 TABLET ORAL at 02:05

## 2018-01-01 RX ADMIN — VASOPRESSIN 2 UNITS: 20 INJECTION INTRAVENOUS at 11:04

## 2018-01-01 RX ADMIN — OXYCODONE HYDROCHLORIDE 5 MG: 5 SOLUTION ORAL at 01:04

## 2018-01-01 RX ADMIN — MEROPENEM 2 G: 1 INJECTION, POWDER, FOR SOLUTION INTRAVENOUS at 06:03

## 2018-01-01 RX ADMIN — INSULIN ASPART 1 UNITS: 100 INJECTION, SOLUTION INTRAVENOUS; SUBCUTANEOUS at 12:06

## 2018-01-01 RX ADMIN — BENZONATATE 100 MG: 100 CAPSULE ORAL at 03:05

## 2018-01-01 RX ADMIN — MIDODRINE HYDROCHLORIDE 10 MG: 5 TABLET ORAL at 01:05

## 2018-01-01 RX ADMIN — HEPARIN SODIUM 5000 UNITS: 5000 INJECTION, SOLUTION INTRAVENOUS; SUBCUTANEOUS at 01:04

## 2018-01-01 RX ADMIN — TACROLIMUS 2 MG: 1 CAPSULE ORAL at 06:04

## 2018-01-01 RX ADMIN — Medication 2 G: at 11:03

## 2018-01-01 RX ADMIN — INSULIN ASPART 1 UNITS: 100 INJECTION, SOLUTION INTRAVENOUS; SUBCUTANEOUS at 01:05

## 2018-01-01 RX ADMIN — PROMETHAZINE HYDROCHLORIDE 25 MG: 25 INJECTION INTRAMUSCULAR; INTRAVENOUS at 10:05

## 2018-01-01 RX ADMIN — NITROGLYCERIN 0.5 INCH: 20 OINTMENT TOPICAL at 09:04

## 2018-01-01 RX ADMIN — POLYETHYLENE GLYCOL 3350 17 G: 17 POWDER, FOR SOLUTION ORAL at 09:05

## 2018-01-01 RX ADMIN — HEPARIN SODIUM 5000 UNITS: 5000 INJECTION, SOLUTION INTRAVENOUS; SUBCUTANEOUS at 06:05

## 2018-01-01 RX ADMIN — ALPRAZOLAM 0.5 MG: 0.5 TABLET ORAL at 09:06

## 2018-01-01 RX ADMIN — POTASSIUM CHLORIDE 20 MEQ: 200 INJECTION, SOLUTION INTRAVENOUS at 06:06

## 2018-01-01 RX ADMIN — CEFEPIME 1 G: 1 INJECTION, POWDER, FOR SOLUTION INTRAMUSCULAR; INTRAVENOUS at 11:06

## 2018-01-01 RX ADMIN — Medication 2 G: at 02:03

## 2018-01-01 RX ADMIN — ALPRAZOLAM 0.5 MG: 0.5 TABLET, ORALLY DISINTEGRATING ORAL at 09:04

## 2018-01-01 RX ADMIN — VASOPRESSIN 0.04 UNITS/MIN: 20 INJECTION INTRAVENOUS at 03:03

## 2018-01-01 RX ADMIN — DIBASIC SODIUM PHOSPHATE, MONOBASIC POTASSIUM PHOSPHATE AND MONOBASIC SODIUM PHOSPHATE 2 TABLET: 852; 155; 130 TABLET ORAL at 08:03

## 2018-01-01 RX ADMIN — ACETAMINOPHEN 650 MG: 650 SOLUTION ORAL at 07:04

## 2018-01-01 RX ADMIN — NORTRIPTYLINE HYDROCHLORIDE 10 MG: 10 CAPSULE ORAL at 09:04

## 2018-01-01 RX ADMIN — GANCICLOVIR SODIUM 200 MG: 500 INJECTION, POWDER, LYOPHILIZED, FOR SOLUTION INTRAVENOUS at 05:03

## 2018-01-01 RX ADMIN — PREDNISONE 5 MG: 5 TABLET ORAL at 09:04

## 2018-01-01 RX ADMIN — TACROLIMUS 3 MG: 1 CAPSULE ORAL at 09:01

## 2018-01-01 RX ADMIN — INSULIN ASPART 2 UNITS: 100 INJECTION, SOLUTION INTRAVENOUS; SUBCUTANEOUS at 05:06

## 2018-01-01 RX ADMIN — PIPERACILLIN AND TAZOBACTAM 4.5 G: 4; .5 INJECTION, POWDER, LYOPHILIZED, FOR SOLUTION INTRAVENOUS; PARENTERAL at 04:03

## 2018-01-01 RX ADMIN — VASOPRESSIN 0.04 UNITS/MIN: 20 INJECTION INTRAVENOUS at 08:03

## 2018-01-01 RX ADMIN — DRONABINOL 2.5 MG: 2.5 CAPSULE ORAL at 08:05

## 2018-01-01 RX ADMIN — DEXMEDETOMIDINE HYDROCHLORIDE 0.6 MCG/KG/HR: 100 INJECTION, SOLUTION, CONCENTRATE INTRAVENOUS at 01:03

## 2018-01-01 RX ADMIN — HEPARIN SODIUM 5000 UNITS: 5000 INJECTION, SOLUTION INTRAVENOUS; SUBCUTANEOUS at 06:03

## 2018-01-01 RX ADMIN — GUAIFENESIN 1200 MG: 600 TABLET, EXTENDED RELEASE ORAL at 09:05

## 2018-01-01 RX ADMIN — Medication 0.03 MCG/KG/MIN: at 09:03

## 2018-01-01 RX ADMIN — ALBUMIN HUMAN 50 G: 0.25 SOLUTION INTRAVENOUS at 09:04

## 2018-01-01 RX ADMIN — MEROPENEM 2 G: 1 INJECTION, POWDER, FOR SOLUTION INTRAVENOUS at 05:03

## 2018-01-01 RX ADMIN — ALPRAZOLAM 0.5 MG: 0.5 TABLET ORAL at 02:05

## 2018-01-01 RX ADMIN — Medication 2 MG: at 05:04

## 2018-01-01 RX ADMIN — GABAPENTIN 100 MG: 100 CAPSULE ORAL at 03:06

## 2018-01-01 RX ADMIN — MAGNESIUM SULFATE IN WATER 2 G: 40 INJECTION, SOLUTION INTRAVENOUS at 06:05

## 2018-01-01 RX ADMIN — FAMOTIDINE 20 MG: 10 INJECTION INTRAVENOUS at 09:03

## 2018-01-01 RX ADMIN — LEVALBUTEROL HYDROCHLORIDE 0.63 MG: 0.63 SOLUTION RESPIRATORY (INHALATION) at 02:05

## 2018-01-01 RX ADMIN — PREDNISONE 2.5 MG: 2.5 TABLET ORAL at 09:01

## 2018-01-01 RX ADMIN — Medication 0.05 MCG/KG/MIN: at 09:06

## 2018-01-01 RX ADMIN — Medication 400 MG: at 08:06

## 2018-01-01 RX ADMIN — INSULIN ASPART 1 UNITS: 100 INJECTION, SOLUTION INTRAVENOUS; SUBCUTANEOUS at 06:05

## 2018-01-01 RX ADMIN — PIPERACILLIN AND TAZOBACTAM 4.5 G: 4; .5 INJECTION, POWDER, LYOPHILIZED, FOR SOLUTION INTRAVENOUS; PARENTERAL at 06:03

## 2018-01-01 RX ADMIN — HYDROCORTISONE SODIUM SUCCINATE 100 MG: 100 INJECTION, POWDER, FOR SOLUTION INTRAMUSCULAR; INTRAVENOUS at 10:03

## 2018-01-01 RX ADMIN — HEPARIN SODIUM 1000 UNITS: 1000 INJECTION, SOLUTION INTRAVENOUS; SUBCUTANEOUS at 02:04

## 2018-01-01 RX ADMIN — GABAPENTIN 900 MG: 300 CAPSULE ORAL at 05:02

## 2018-01-01 RX ADMIN — MAGNESIUM HYDROXIDE 2400 MG: 400 SUSPENSION ORAL at 10:05

## 2018-01-01 RX ADMIN — GABAPENTIN 900 MG: 300 CAPSULE ORAL at 01:01

## 2018-01-01 RX ADMIN — MIDAZOLAM HYDROCHLORIDE 2 MG: 1 INJECTION, SOLUTION INTRAMUSCULAR; INTRAVENOUS at 10:04

## 2018-01-01 RX ADMIN — TAMSULOSIN HYDROCHLORIDE 0.4 MG: 0.4 CAPSULE ORAL at 09:03

## 2018-01-01 RX ADMIN — ONDANSETRON 8 MG: 8 TABLET, ORALLY DISINTEGRATING ORAL at 08:05

## 2018-01-01 RX ADMIN — SODIUM CHLORIDE, SODIUM GLUCONATE, SODIUM ACETATE, POTASSIUM CHLORIDE, MAGNESIUM CHLORIDE, SODIUM PHOSPHATE, DIBASIC, AND POTASSIUM PHOSPHATE: .53; .5; .37; .037; .03; .012; .00082 INJECTION, SOLUTION INTRAVENOUS at 08:01

## 2018-01-01 RX ADMIN — TACROLIMUS 5 MG: 1 CAPSULE ORAL at 06:05

## 2018-01-01 RX ADMIN — DEXMEDETOMIDINE HYDROCHLORIDE 0.5 MCG/KG/HR: 100 INJECTION, SOLUTION, CONCENTRATE INTRAVENOUS at 04:03

## 2018-01-01 RX ADMIN — HYDROCORTISONE SODIUM SUCCINATE 50 MG: 100 INJECTION, POWDER, FOR SOLUTION INTRAMUSCULAR; INTRAVENOUS at 05:03

## 2018-01-01 RX ADMIN — ASPIRIN 81 MG: 81 TABLET, COATED ORAL at 07:06

## 2018-01-01 RX ADMIN — ERYTHROPOIETIN 4000 UNITS: 4000 INJECTION, SOLUTION INTRAVENOUS; SUBCUTANEOUS at 11:04

## 2018-01-01 RX ADMIN — INSULIN ASPART 2 UNITS: 100 INJECTION, SOLUTION INTRAVENOUS; SUBCUTANEOUS at 04:03

## 2018-01-01 RX ADMIN — PROMETHAZINE HYDROCHLORIDE 25 MG: 25 INJECTION INTRAMUSCULAR; INTRAVENOUS at 03:05

## 2018-01-01 RX ADMIN — PREDNISONE 2.5 MG: 2.5 TABLET ORAL at 08:06

## 2018-01-01 RX ADMIN — Medication 0.05 MCG/KG/MIN: at 10:06

## 2018-01-01 RX ADMIN — RIBAVIRIN 600 MG: 200 CAPSULE ORAL at 10:03

## 2018-01-01 RX ADMIN — ONDANSETRON 4 MG: 2 INJECTION INTRAMUSCULAR; INTRAVENOUS at 08:01

## 2018-01-01 RX ADMIN — CHLORHEXIDINE GLUCONATE 15 ML: 1.2 RINSE ORAL at 07:04

## 2018-01-01 RX ADMIN — METOCLOPRAMIDE 5 MG: 5 INJECTION, SOLUTION INTRAMUSCULAR; INTRAVENOUS at 05:03

## 2018-01-01 RX ADMIN — TACROLIMUS 1 MG: 1 CAPSULE ORAL at 09:03

## 2018-01-01 RX ADMIN — INSULIN ASPART 1 UNITS: 100 INJECTION, SOLUTION INTRAVENOUS; SUBCUTANEOUS at 09:05

## 2018-01-01 RX ADMIN — HEPARIN SODIUM 5000 UNITS: 5000 INJECTION, SOLUTION INTRAVENOUS; SUBCUTANEOUS at 05:02

## 2018-01-01 RX ADMIN — ERYTHROPOIETIN 10000 UNITS: 10000 INJECTION, SOLUTION INTRAVENOUS; SUBCUTANEOUS at 03:06

## 2018-01-01 RX ADMIN — INSULIN ASPART 4 UNITS: 100 INJECTION, SOLUTION INTRAVENOUS; SUBCUTANEOUS at 05:05

## 2018-01-01 RX ADMIN — POSACONAZOLE 200 MG: 40 SUSPENSION ORAL at 02:03

## 2018-01-01 RX ADMIN — HYDROCORTISONE SODIUM SUCCINATE 25 MG: 100 INJECTION, POWDER, FOR SOLUTION INTRAMUSCULAR; INTRAVENOUS at 09:03

## 2018-01-01 RX ADMIN — HYDROMORPHONE HYDROCHLORIDE 0.2 MG: 2 INJECTION INTRAMUSCULAR; INTRAVENOUS; SUBCUTANEOUS at 12:01

## 2018-01-01 RX ADMIN — MYCOPHENILIC ACID 720 MG: 180 TABLET, DELAYED RELEASE ORAL at 08:03

## 2018-01-01 RX ADMIN — INSULIN ASPART 3 UNITS: 100 INJECTION, SOLUTION INTRAVENOUS; SUBCUTANEOUS at 08:05

## 2018-01-01 RX ADMIN — GUAIFENESIN 600 MG: 600 TABLET, EXTENDED RELEASE ORAL at 07:05

## 2018-01-01 RX ADMIN — INSULIN ASPART 3 UNITS: 100 INJECTION, SOLUTION INTRAVENOUS; SUBCUTANEOUS at 01:05

## 2018-01-01 RX ADMIN — CETIRIZINE HYDROCHLORIDE 5 MG: 5 TABLET, FILM COATED ORAL at 06:05

## 2018-01-01 RX ADMIN — MIDODRINE HYDROCHLORIDE 10 MG: 5 TABLET ORAL at 03:05

## 2018-01-01 RX ADMIN — VANCOMYCIN HYDROCHLORIDE 1 G: 10 INJECTION, POWDER, LYOPHILIZED, FOR SOLUTION INTRAVENOUS at 03:05

## 2018-01-01 RX ADMIN — CHLORHEXIDINE GLUCONATE 15 ML: 1.2 RINSE ORAL at 09:04

## 2018-01-01 RX ADMIN — VASOPRESSIN 0.04 UNITS/MIN: 20 INJECTION INTRAVENOUS at 09:03

## 2018-01-01 RX ADMIN — SODIUM CHLORIDE 0.7 UNITS/HR: 9 INJECTION, SOLUTION INTRAVENOUS at 12:04

## 2018-01-01 RX ADMIN — GUAIFENESIN 600 MG: 600 TABLET, EXTENDED RELEASE ORAL at 01:05

## 2018-01-01 RX ADMIN — Medication 5 UNITS: at 09:05

## 2018-01-01 RX ADMIN — ESCITALOPRAM OXALATE 20 MG: 10 TABLET ORAL at 10:03

## 2018-01-01 RX ADMIN — OXYCODONE HYDROCHLORIDE 5 MG: 5 SOLUTION ORAL at 06:04

## 2018-01-01 RX ADMIN — PRAVASTATIN SODIUM 40 MG: 40 TABLET ORAL at 09:05

## 2018-01-01 RX ADMIN — TACROLIMUS 0.5 MG: 0.5 CAPSULE ORAL at 10:03

## 2018-01-01 RX ADMIN — MIDODRINE HYDROCHLORIDE 10 MG: 5 TABLET ORAL at 08:05

## 2018-01-01 RX ADMIN — Medication 1 MG: at 08:04

## 2018-01-01 RX ADMIN — HEPARIN SODIUM 5000 UNITS: 5000 INJECTION, SOLUTION INTRAVENOUS; SUBCUTANEOUS at 09:02

## 2018-01-01 RX ADMIN — HEPARIN SODIUM 1000 UNITS: 1000 INJECTION, SOLUTION INTRAVENOUS; SUBCUTANEOUS at 07:05

## 2018-01-01 RX ADMIN — GANCICLOVIR SODIUM 200 MG: 500 INJECTION, POWDER, LYOPHILIZED, FOR SOLUTION INTRAVENOUS at 04:03

## 2018-01-01 RX ADMIN — PREDNISONE 5 MG: 5 TABLET ORAL at 05:05

## 2018-01-01 RX ADMIN — PANTOPRAZOLE SODIUM 40 MG: 40 TABLET, DELAYED RELEASE ORAL at 09:05

## 2018-01-01 RX ADMIN — SODIUM CHLORIDE 0.8 UNITS/HR: 9 INJECTION, SOLUTION INTRAVENOUS at 05:04

## 2018-01-01 RX ADMIN — ACETAMINOPHEN 650 MG: 325 TABLET ORAL at 09:05

## 2018-01-01 RX ADMIN — ESCITALOPRAM 20 MG: 5 TABLET, FILM COATED ORAL at 08:06

## 2018-01-01 RX ADMIN — CHLORHEXIDINE GLUCONATE 15 ML: 1.2 RINSE ORAL at 10:03

## 2018-01-01 RX ADMIN — POLYETHYLENE GLYCOL 3350 17 G: 17 POWDER, FOR SOLUTION ORAL at 09:01

## 2018-01-01 RX ADMIN — MIDODRINE HYDROCHLORIDE 10 MG: 5 TABLET ORAL at 06:05

## 2018-01-01 RX ADMIN — MAGNESIUM SULFATE IN WATER 2 G: 40 INJECTION, SOLUTION INTRAVENOUS at 12:04

## 2018-01-01 RX ADMIN — INSULIN ASPART 2 UNITS: 100 INJECTION, SOLUTION INTRAVENOUS; SUBCUTANEOUS at 09:04

## 2018-01-01 RX ADMIN — PANTOPRAZOLE SODIUM 40 MG: 40 TABLET, DELAYED RELEASE ORAL at 09:01

## 2018-01-01 RX ADMIN — Medication 3 ML: at 01:03

## 2018-01-01 RX ADMIN — PHENYLEPHRINE HYDROCHLORIDE 100 MCG: 10 INJECTION INTRAVENOUS at 04:03

## 2018-01-01 RX ADMIN — DIBASIC SODIUM PHOSPHATE, MONOBASIC POTASSIUM PHOSPHATE AND MONOBASIC SODIUM PHOSPHATE 2 TABLET: 852; 155; 130 TABLET ORAL at 06:03

## 2018-01-01 RX ADMIN — ALPRAZOLAM 0.5 MG: 0.5 TABLET ORAL at 12:05

## 2018-01-01 RX ADMIN — GABAPENTIN 900 MG: 300 CAPSULE ORAL at 09:02

## 2018-01-01 RX ADMIN — SODIUM CHLORIDE 350 ML: 0.9 INJECTION, SOLUTION INTRAVENOUS at 03:06

## 2018-01-01 RX ADMIN — METOCLOPRAMIDE 5 MG: 5 INJECTION, SOLUTION INTRAMUSCULAR; INTRAVENOUS at 12:03

## 2018-01-01 RX ADMIN — SODIUM CHLORIDE 0.02 MCG/KG/MIN: 9 INJECTION, SOLUTION INTRAVENOUS at 03:03

## 2018-01-01 RX ADMIN — SODIUM CHLORIDE 1 UNITS/HR: 9 INJECTION, SOLUTION INTRAVENOUS at 01:04

## 2018-01-01 RX ADMIN — PREDNISONE 5 MG: 5 TABLET ORAL at 09:05

## 2018-01-01 RX ADMIN — PREDNISONE 7.5 MG: 2.5 TABLET ORAL at 05:04

## 2018-01-01 RX ADMIN — VASOPRESSIN 0.04 UNITS/MIN: 20 INJECTION INTRAVENOUS at 07:03

## 2018-01-01 RX ADMIN — TACROLIMUS 4 MG: 1 CAPSULE ORAL at 06:05

## 2018-01-01 RX ADMIN — GLYCOPYRROLATE 0.6 MG: 0.2 INJECTION, SOLUTION INTRAMUSCULAR; INTRAVENOUS at 11:04

## 2018-01-01 RX ADMIN — PANTOPRAZOLE SODIUM 40 MG: 40 TABLET, DELAYED RELEASE ORAL at 10:06

## 2018-01-01 RX ADMIN — DIPHENHYDRAMINE HYDROCHLORIDE 25 MG: 50 INJECTION, SOLUTION INTRAMUSCULAR; INTRAVENOUS at 08:03

## 2018-01-01 RX ADMIN — SODIUM CHLORIDE 1 UNITS/HR: 9 INJECTION, SOLUTION INTRAVENOUS at 04:04

## 2018-01-01 RX ADMIN — PHENYLEPHRINE HYDROCHLORIDE 200 MCG: 10 INJECTION INTRAVENOUS at 03:03

## 2018-01-01 RX ADMIN — MIDAZOLAM HYDROCHLORIDE 10 MG/HR: 5 INJECTION, SOLUTION INTRAMUSCULAR; INTRAVENOUS at 01:03

## 2018-01-01 RX ADMIN — ESCITALOPRAM OXALATE 20 MG: 10 TABLET ORAL at 02:05

## 2018-01-01 RX ADMIN — ASPIRIN 81 MG: 81 TABLET, COATED ORAL at 09:01

## 2018-01-01 RX ADMIN — PROPOFOL 50 MCG/KG/MIN: 10 INJECTION, EMULSION INTRAVENOUS at 03:03

## 2018-01-01 RX ADMIN — INSULIN ASPART 4 UNITS: 100 INJECTION, SOLUTION INTRAVENOUS; SUBCUTANEOUS at 11:05

## 2018-01-01 RX ADMIN — DEXAMETHASONE SODIUM PHOSPHATE 4 MG: 4 INJECTION, SOLUTION INTRAMUSCULAR; INTRAVENOUS at 07:01

## 2018-01-01 RX ADMIN — SODIUM CHLORIDE: 0.9 INJECTION, SOLUTION INTRAVENOUS at 01:03

## 2018-01-01 RX ADMIN — Medication 3 ML: at 05:02

## 2018-01-01 RX ADMIN — HYDROCORTISONE SODIUM SUCCINATE 25 MG: 100 INJECTION, POWDER, FOR SOLUTION INTRAMUSCULAR; INTRAVENOUS at 08:04

## 2018-01-01 RX ADMIN — HEPARIN SODIUM 5000 UNITS: 5000 INJECTION, SOLUTION INTRAVENOUS; SUBCUTANEOUS at 11:04

## 2018-01-01 RX ADMIN — LINEZOLID 600 MG: 600 INJECTION, SOLUTION INTRAVENOUS at 06:03

## 2018-01-01 RX ADMIN — ONDANSETRON 8 MG: 8 TABLET, ORALLY DISINTEGRATING ORAL at 04:06

## 2018-01-01 RX ADMIN — LEVOTHYROXINE SODIUM 137 MCG: 25 TABLET ORAL at 05:06

## 2018-01-01 RX ADMIN — MOXIFLOXACIN HYDROCHLORIDE 400 MG: 400 INJECTION, SOLUTION INTRAVENOUS at 01:05

## 2018-01-01 RX ADMIN — MIDODRINE HYDROCHLORIDE 10 MG: 5 TABLET ORAL at 07:06

## 2018-01-01 RX ADMIN — ESCITALOPRAM OXALATE 20 MG: 10 TABLET ORAL at 09:05

## 2018-01-01 RX ADMIN — FENTANYL CITRATE 100 MCG: 50 INJECTION, SOLUTION INTRAMUSCULAR; INTRAVENOUS at 03:03

## 2018-01-01 RX ADMIN — HEPARIN SODIUM 1000 UNITS: 1000 INJECTION, SOLUTION INTRAVENOUS; SUBCUTANEOUS at 06:06

## 2018-01-01 RX ADMIN — PIPERACILLIN AND TAZOBACTAM 4.5 G: 4; .5 INJECTION, POWDER, LYOPHILIZED, FOR SOLUTION INTRAVENOUS; PARENTERAL at 01:03

## 2018-01-01 RX ADMIN — POSACONAZOLE 200 MG: 40 SUSPENSION ORAL at 05:03

## 2018-01-01 RX ADMIN — SODIUM CHLORIDE 1.6 UNITS/HR: 9 INJECTION, SOLUTION INTRAVENOUS at 09:05

## 2018-01-01 RX ADMIN — MAGNESIUM OXIDE TAB 400 MG (241.3 MG ELEMENTAL MG) 400 MG: 400 (241.3 MG) TAB at 09:05

## 2018-01-01 RX ADMIN — LEVALBUTEROL HYDROCHLORIDE 0.63 MG: 0.63 SOLUTION RESPIRATORY (INHALATION) at 11:05

## 2018-01-01 RX ADMIN — TACROLIMUS 0.5 MG: 0.5 CAPSULE ORAL at 08:03

## 2018-01-01 RX ADMIN — POLYETHYLENE GLYCOL 3350 17 G: 17 POWDER, FOR SOLUTION ORAL at 08:03

## 2018-01-01 RX ADMIN — INSULIN ASPART 4 UNITS: 100 INJECTION, SOLUTION INTRAVENOUS; SUBCUTANEOUS at 08:05

## 2018-01-01 RX ADMIN — IPRATROPIUM BROMIDE AND ALBUTEROL SULFATE 3 ML: .5; 3 SOLUTION RESPIRATORY (INHALATION) at 05:05

## 2018-01-01 RX ADMIN — POSACONAZOLE 200 MG: 40 SUSPENSION ORAL at 01:03

## 2018-01-01 RX ADMIN — SODIUM CHLORIDE: 0.9 INJECTION, SOLUTION INTRAVENOUS at 05:03

## 2018-01-01 RX ADMIN — SODIUM CHLORIDE 200 ML/HR: 0.9 INJECTION, SOLUTION INTRAVENOUS at 10:06

## 2018-01-01 RX ADMIN — PREDNISONE 2.5 MG: 2.5 TABLET ORAL at 08:03

## 2018-01-01 RX ADMIN — MYCOPHENOLATE MOFETIL 250 MG: 250 CAPSULE ORAL at 05:05

## 2018-01-01 RX ADMIN — OXYCODONE HYDROCHLORIDE 5 MG: 5 TABLET ORAL at 01:05

## 2018-01-01 RX ADMIN — PREDNISONE 7.5 MG: 2.5 TABLET ORAL at 08:04

## 2018-01-01 RX ADMIN — OXYCODONE HYDROCHLORIDE 10 MG: 5 SOLUTION ORAL at 03:04

## 2018-01-01 RX ADMIN — METRONIDAZOLE 500 MG: 500 INJECTION, SOLUTION INTRAVENOUS at 12:05

## 2018-01-01 RX ADMIN — GABAPENTIN 900 MG: 300 CAPSULE ORAL at 04:02

## 2018-01-01 RX ADMIN — CISATRACURIUM BESYLATE 10 MG: 10 INJECTION INTRAVENOUS at 03:03

## 2018-01-01 RX ADMIN — INSULIN ASPART 2 UNITS: 100 INJECTION, SOLUTION INTRAVENOUS; SUBCUTANEOUS at 11:04

## 2018-01-01 RX ADMIN — BENZONATATE 200 MG: 100 CAPSULE ORAL at 08:03

## 2018-01-01 RX ADMIN — HYDROCORTISONE SODIUM SUCCINATE 100 MG: 100 INJECTION, POWDER, FOR SOLUTION INTRAMUSCULAR; INTRAVENOUS at 05:03

## 2018-01-01 RX ADMIN — LEVALBUTEROL HYDROCHLORIDE 0.63 MG: 0.63 SOLUTION RESPIRATORY (INHALATION) at 07:05

## 2018-01-01 RX ADMIN — INSULIN ASPART 3 UNITS: 100 INJECTION, SOLUTION INTRAVENOUS; SUBCUTANEOUS at 12:02

## 2018-01-01 RX ADMIN — ONDANSETRON 8 MG: 8 TABLET, ORALLY DISINTEGRATING ORAL at 01:03

## 2018-01-01 RX ADMIN — PANTOPRAZOLE SODIUM 40 MG: 40 TABLET, DELAYED RELEASE ORAL at 08:03

## 2018-01-01 RX ADMIN — HEPARIN SODIUM 1000 UNITS: 1000 INJECTION, SOLUTION INTRAVENOUS; SUBCUTANEOUS at 11:05

## 2018-01-01 RX ADMIN — CALCIUM CARBONATE (ANTACID) CHEW TAB 500 MG 500 MG: 500 CHEW TAB at 09:05

## 2018-01-01 RX ADMIN — INSULIN ASPART 5 UNITS: 100 INJECTION, SOLUTION INTRAVENOUS; SUBCUTANEOUS at 08:05

## 2018-01-01 RX ADMIN — GABAPENTIN 900 MG: 300 CAPSULE ORAL at 09:03

## 2018-01-01 RX ADMIN — SODIUM CHLORIDE: 0.9 INJECTION, SOLUTION INTRAVENOUS at 12:03

## 2018-01-01 RX ADMIN — MIDODRINE HYDROCHLORIDE 10 MG: 5 TABLET ORAL at 02:04

## 2018-01-01 RX ADMIN — ALPRAZOLAM 0.5 MG: 0.5 TABLET ORAL at 01:05

## 2018-01-01 RX ADMIN — INSULIN ASPART 4 UNITS: 100 INJECTION, SOLUTION INTRAVENOUS; SUBCUTANEOUS at 08:04

## 2018-01-01 RX ADMIN — ERYTHROPOIETIN 8100 UNITS: 10000 INJECTION, SOLUTION INTRAVENOUS; SUBCUTANEOUS at 10:05

## 2018-01-01 RX ADMIN — INSULIN ASPART 4 UNITS: 100 INJECTION, SOLUTION INTRAVENOUS; SUBCUTANEOUS at 09:04

## 2018-01-01 RX ADMIN — QUETIAPINE FUMARATE 50 MG: 25 TABLET ORAL at 09:05

## 2018-01-01 RX ADMIN — IPRATROPIUM BROMIDE AND ALBUTEROL SULFATE 3 ML: .5; 3 SOLUTION RESPIRATORY (INHALATION) at 08:06

## 2018-01-01 RX ADMIN — RIBAVIRIN 600 MG: 200 CAPSULE ORAL at 09:03

## 2018-01-01 RX ADMIN — TACROLIMUS 0.5 MG: 0.5 CAPSULE ORAL at 06:03

## 2018-01-01 RX ADMIN — Medication 4 MG: at 06:04

## 2018-01-01 RX ADMIN — Medication 3 ML: at 06:02

## 2018-01-01 RX ADMIN — Medication 50 MCG/HR: at 02:03

## 2018-01-01 RX ADMIN — SODIUM CHLORIDE 300 ML: 9 INJECTION, SOLUTION INTRAVENOUS at 10:05

## 2018-01-01 RX ADMIN — HYDROCORTISONE SODIUM SUCCINATE 50 MG: 100 INJECTION, POWDER, FOR SOLUTION INTRAMUSCULAR; INTRAVENOUS at 08:03

## 2018-01-01 RX ADMIN — SODIUM CHLORIDE 1.27 UNITS/HR: 9 INJECTION, SOLUTION INTRAVENOUS at 06:03

## 2018-01-01 RX ADMIN — ASPIRIN 81 MG: 81 TABLET, COATED ORAL at 09:05

## 2018-01-01 RX ADMIN — ONDANSETRON 4 MG: 4 TABLET, ORALLY DISINTEGRATING ORAL at 05:05

## 2018-01-01 RX ADMIN — FAMOTIDINE 20 MG: 20 TABLET, FILM COATED ORAL at 09:05

## 2018-01-01 RX ADMIN — MIDODRINE HYDROCHLORIDE 10 MG: 5 TABLET ORAL at 11:06

## 2018-01-01 RX ADMIN — POLYETHYLENE GLYCOL 3350 17 G: 17 POWDER, FOR SOLUTION ORAL at 09:04

## 2018-01-01 RX ADMIN — ESCITALOPRAM 20 MG: 5 TABLET, FILM COATED ORAL at 09:05

## 2018-01-01 RX ADMIN — MYCOPHENILIC ACID 720 MG: 180 TABLET, DELAYED RELEASE ORAL at 08:02

## 2018-01-01 RX ADMIN — LEVOTHYROXINE SODIUM 125 MCG: 75 TABLET ORAL at 11:03

## 2018-01-01 RX ADMIN — DOCUSATE SODIUM 100 MG: 100 CAPSULE, LIQUID FILLED ORAL at 05:05

## 2018-01-01 RX ADMIN — MAGNESIUM SULFATE IN WATER 2 G: 40 INJECTION, SOLUTION INTRAVENOUS at 05:06

## 2018-01-01 RX ADMIN — GUAIFENESIN AND DEXTROMETHORPHAN HYDROBROMIDE 1 TABLET: 600; 30 TABLET, EXTENDED RELEASE ORAL at 08:03

## 2018-01-01 RX ADMIN — ONDANSETRON 4 MG: 4 TABLET, ORALLY DISINTEGRATING ORAL at 05:04

## 2018-01-01 RX ADMIN — METOCLOPRAMIDE 5 MG: 5 INJECTION, SOLUTION INTRAMUSCULAR; INTRAVENOUS at 06:03

## 2018-01-01 RX ADMIN — FENTANYL CITRATE 50 MCG: 50 INJECTION, SOLUTION INTRAMUSCULAR; INTRAVENOUS at 11:03

## 2018-01-01 RX ADMIN — RIBAVIRIN 600 MG: 200 CAPSULE ORAL at 08:03

## 2018-01-01 RX ADMIN — PREDNISONE 5 MG: 5 TABLET ORAL at 10:04

## 2018-01-01 RX ADMIN — HEPARIN SODIUM 5000 UNITS: 5000 INJECTION, SOLUTION INTRAVENOUS; SUBCUTANEOUS at 06:02

## 2018-01-01 RX ADMIN — ALPRAZOLAM 0.5 MG: 0.5 TABLET ORAL at 08:05

## 2018-01-01 RX ADMIN — LEVALBUTEROL HYDROCHLORIDE 0.63 MG: 0.63 SOLUTION RESPIRATORY (INHALATION) at 03:06

## 2018-01-01 RX ADMIN — BENZONATATE 200 MG: 100 CAPSULE ORAL at 02:03

## 2018-01-01 RX ADMIN — CETIRIZINE HYDROCHLORIDE 5 MG: 5 TABLET, FILM COATED ORAL at 08:05

## 2018-01-01 RX ADMIN — ALBUMIN HUMAN 25 G: 0.25 SOLUTION INTRAVENOUS at 08:05

## 2018-01-01 RX ADMIN — PREDNISONE 2.5 MG: 2.5 TABLET ORAL at 08:02

## 2018-01-01 RX ADMIN — MIDODRINE HYDROCHLORIDE 10 MG: 5 TABLET ORAL at 06:06

## 2018-01-01 RX ADMIN — ESCITALOPRAM 20 MG: 20 TABLET, FILM COATED ORAL at 08:06

## 2018-01-01 RX ADMIN — FAMOTIDINE 20 MG: 20 TABLET, FILM COATED ORAL at 10:04

## 2018-01-01 RX ADMIN — METHYLPREDNISOLONE SODIUM SUCCINATE 80 MG: 40 INJECTION, POWDER, LYOPHILIZED, FOR SOLUTION INTRAMUSCULAR; INTRAVENOUS at 05:05

## 2018-01-01 RX ADMIN — MORPHINE SULFATE 1 MG/HR: 10 INJECTION INTRAVENOUS at 06:06

## 2018-01-01 RX ADMIN — PROPOFOL 20 MG: 10 INJECTION, EMULSION INTRAVENOUS at 03:03

## 2018-01-01 RX ADMIN — TACROLIMUS 5 MG: 1 CAPSULE ORAL at 08:05

## 2018-01-01 RX ADMIN — HEPARIN SODIUM 1000 UNITS: 1000 INJECTION, SOLUTION INTRAVENOUS; SUBCUTANEOUS at 03:04

## 2018-01-01 RX ADMIN — NOREPINEPHRINE BITARTRATE 0.1 MCG/KG/MIN: 1 INJECTION, SOLUTION, CONCENTRATE INTRAVENOUS at 03:03

## 2018-01-01 RX ADMIN — LEVALBUTEROL HYDROCHLORIDE 0.63 MG: 0.63 SOLUTION RESPIRATORY (INHALATION) at 01:05

## 2018-01-01 RX ADMIN — TACROLIMUS 3 MG: 1 CAPSULE ORAL at 05:03

## 2018-01-01 RX ADMIN — NITROGLYCERIN 0.5 INCH: 20 OINTMENT TOPICAL at 10:04

## 2018-01-01 RX ADMIN — ERYTHROPOIETIN 4000 UNITS: 4000 INJECTION, SOLUTION INTRAVENOUS; SUBCUTANEOUS at 09:05

## 2018-01-01 RX ADMIN — QUETIAPINE FUMARATE 50 MG: 25 TABLET, FILM COATED ORAL at 08:04

## 2018-01-01 RX ADMIN — PREDNISONE 5 MG: 5 TABLET ORAL at 06:04

## 2018-01-01 RX ADMIN — TACROLIMUS 3 MG: 1 CAPSULE ORAL at 06:04

## 2018-01-01 RX ADMIN — FENTANYL CITRATE 50 MCG: 50 INJECTION, SOLUTION INTRAMUSCULAR; INTRAVENOUS at 09:03

## 2018-01-01 RX ADMIN — DOCUSATE SODIUM 100 MG: 50 LIQUID ORAL at 09:03

## 2018-01-01 RX ADMIN — DIBASIC SODIUM PHOSPHATE, MONOBASIC POTASSIUM PHOSPHATE AND MONOBASIC SODIUM PHOSPHATE 2 TABLET: 852; 155; 130 TABLET ORAL at 03:03

## 2018-01-01 RX ADMIN — DEXTROSE 40 MG: 50 INJECTION, SOLUTION INTRAVENOUS at 10:06

## 2018-01-01 RX ADMIN — IPRATROPIUM BROMIDE AND ALBUTEROL SULFATE 3 ML: .5; 3 SOLUTION RESPIRATORY (INHALATION) at 09:04

## 2018-01-01 RX ADMIN — LEVALBUTEROL HYDROCHLORIDE 0.63 MG: 0.63 SOLUTION RESPIRATORY (INHALATION) at 12:06

## 2018-01-01 RX ADMIN — CALCIUM CARBONATE (ANTACID) CHEW TAB 500 MG 500 MG: 500 CHEW TAB at 02:05

## 2018-01-01 RX ADMIN — Medication 2 G: at 01:03

## 2018-01-01 RX ADMIN — INSULIN ASPART 2 UNITS: 100 INJECTION, SOLUTION INTRAVENOUS; SUBCUTANEOUS at 01:06

## 2018-01-01 RX ADMIN — CHLORHEXIDINE GLUCONATE 15 ML: 1.2 RINSE ORAL at 05:04

## 2018-01-01 RX ADMIN — TACROLIMUS 4 MG: 1 CAPSULE ORAL at 06:04

## 2018-01-01 RX ADMIN — INSULIN DETEMIR 15 UNITS: 100 INJECTION, SOLUTION SUBCUTANEOUS at 10:05

## 2018-01-01 RX ADMIN — POLYETHYLENE GLYCOL 3350 17 G: 17 POWDER, FOR SOLUTION ORAL at 07:05

## 2018-01-01 RX ADMIN — DEXTROSE 2 G: 50 INJECTION, SOLUTION INTRAVENOUS at 10:04

## 2018-01-01 RX ADMIN — ACETAMINOPHEN 999 MG: 650 SOLUTION ORAL at 09:04

## 2018-01-01 RX ADMIN — FENTANYL CITRATE 50 MCG: 50 INJECTION, SOLUTION INTRAMUSCULAR; INTRAVENOUS at 03:03

## 2018-01-01 RX ADMIN — METRONIDAZOLE 500 MG: 500 INJECTION, SOLUTION INTRAVENOUS at 02:05

## 2018-01-01 RX ADMIN — TACROLIMUS 1 MG: 1 CAPSULE ORAL at 05:04

## 2018-01-01 RX ADMIN — TAMSULOSIN HYDROCHLORIDE 0.8 MG: 0.4 CAPSULE ORAL at 10:02

## 2018-01-01 RX ADMIN — ENOXAPARIN SODIUM 30 MG: 100 INJECTION SUBCUTANEOUS at 05:06

## 2018-01-01 RX ADMIN — SODIUM CHLORIDE: 0.9 INJECTION, SOLUTION INTRAVENOUS at 06:04

## 2018-01-01 RX ADMIN — PROMETHAZINE HYDROCHLORIDE AND CODEINE PHOSPHATE 5 ML: 10; 6.25 SOLUTION ORAL at 04:03

## 2018-01-01 RX ADMIN — ACETAMINOPHEN 999.01 MG: 650 SOLUTION ORAL at 03:04

## 2018-01-01 RX ADMIN — GABAPENTIN 300 MG: 300 CAPSULE ORAL at 03:06

## 2018-01-01 RX ADMIN — TACROLIMUS 1 MG: 1 CAPSULE ORAL at 08:03

## 2018-01-01 RX ADMIN — ACETAMINOPHEN 999.01 MG: 650 SOLUTION ORAL at 02:04

## 2018-01-01 RX ADMIN — MIDODRINE HYDROCHLORIDE 15 MG: 5 TABLET ORAL at 06:05

## 2018-01-01 RX ADMIN — SODIUM CHLORIDE 500 ML: 9 INJECTION, SOLUTION INTRAVENOUS at 07:03

## 2018-01-01 RX ADMIN — GUAIFENESIN AND DEXTROMETHORPHAN HYDROBROMIDE 1 TABLET: 600; 30 TABLET, EXTENDED RELEASE ORAL at 10:03

## 2018-01-01 RX ADMIN — LEVOTHYROXINE SODIUM ANHYDROUS 75 MCG: 100 INJECTION, POWDER, LYOPHILIZED, FOR SOLUTION INTRAVENOUS at 08:04

## 2018-01-01 RX ADMIN — ONDANSETRON 8 MG: 8 TABLET, ORALLY DISINTEGRATING ORAL at 10:06

## 2018-01-01 RX ADMIN — IPRATROPIUM BROMIDE AND ALBUTEROL SULFATE 3 ML: .5; 3 SOLUTION RESPIRATORY (INHALATION) at 02:05

## 2018-01-01 RX ADMIN — QUETIAPINE FUMARATE 50 MG: 25 TABLET, FILM COATED ORAL at 07:04

## 2018-01-01 RX ADMIN — SODIUM CHLORIDE: 0.9 INJECTION, SOLUTION INTRAVENOUS at 10:05

## 2018-01-01 RX ADMIN — SENNOSIDES 5 ML: 8.8 SYRUP ORAL at 09:04

## 2018-01-01 RX ADMIN — SODIUM CHLORIDE 0.8 UNITS/HR: 9 INJECTION, SOLUTION INTRAVENOUS at 09:04

## 2018-01-01 RX ADMIN — Medication 3 ML: at 10:02

## 2018-01-01 RX ADMIN — SODIUM CHLORIDE 250 ML: 9 INJECTION, SOLUTION INTRAVENOUS at 02:06

## 2018-01-01 RX ADMIN — ALPRAZOLAM 0.25 MG: 0.25 TABLET ORAL at 10:04

## 2018-01-01 RX ADMIN — HEPARIN SODIUM 5000 UNITS: 5000 INJECTION, SOLUTION INTRAVENOUS; SUBCUTANEOUS at 01:03

## 2018-01-01 RX ADMIN — CETIRIZINE HYDROCHLORIDE 5 MG: 5 TABLET, FILM COATED ORAL at 07:04

## 2018-01-01 RX ADMIN — MYCOPHENOLATE MOFETIL 250 MG: 250 CAPSULE ORAL at 07:06

## 2018-01-01 RX ADMIN — POSACONAZOLE 200 MG: 40 SUSPENSION ORAL at 06:03

## 2018-01-01 RX ADMIN — PROMETHAZINE HYDROCHLORIDE 6.25 MG: 25 INJECTION INTRAMUSCULAR; INTRAVENOUS at 09:04

## 2018-01-01 RX ADMIN — TACROLIMUS 2 MG: 1 CAPSULE ORAL at 08:03

## 2018-01-01 RX ADMIN — MOXIFLOXACIN HYDROCHLORIDE 400 MG: 400 TABLET, FILM COATED ORAL at 02:05

## 2018-01-01 RX ADMIN — INSULIN ASPART 2 UNITS: 100 INJECTION, SOLUTION INTRAVENOUS; SUBCUTANEOUS at 08:06

## 2018-01-01 RX ADMIN — SODIUM CHLORIDE: 0.9 INJECTION, SOLUTION INTRAVENOUS at 07:03

## 2018-01-01 RX ADMIN — HEPARIN SODIUM 5000 UNITS: 5000 INJECTION, SOLUTION INTRAVENOUS; SUBCUTANEOUS at 02:02

## 2018-01-01 RX ADMIN — TACROLIMUS 1 MG: 1 CAPSULE ORAL at 06:04

## 2018-01-01 RX ADMIN — LEVALBUTEROL HYDROCHLORIDE 0.63 MG: 0.63 SOLUTION RESPIRATORY (INHALATION) at 04:06

## 2018-01-01 RX ADMIN — NORTRIPTYLINE HYDROCHLORIDE 25 MG: 25 CAPSULE ORAL at 10:02

## 2018-01-01 RX ADMIN — HEPARIN SODIUM 1000 UNITS: 1000 INJECTION, SOLUTION INTRAVENOUS; SUBCUTANEOUS at 10:05

## 2018-01-01 RX ADMIN — DIBASIC SODIUM PHOSPHATE, MONOBASIC POTASSIUM PHOSPHATE AND MONOBASIC SODIUM PHOSPHATE 2 TABLET: 852; 155; 130 TABLET ORAL at 01:03

## 2018-01-01 RX ADMIN — MIDODRINE HYDROCHLORIDE 2.5 MG: 2.5 TABLET ORAL at 02:04

## 2018-01-01 RX ADMIN — OXYCODONE HYDROCHLORIDE 5 MG: 5 SOLUTION ORAL at 08:04

## 2018-01-01 RX ADMIN — DOCUSATE SODIUM 100 MG: 50 LIQUID ORAL at 10:03

## 2018-01-01 RX ADMIN — METHYLPREDNISOLONE SODIUM SUCCINATE 80 MG: 40 INJECTION, POWDER, LYOPHILIZED, FOR SOLUTION INTRAMUSCULAR; INTRAVENOUS at 01:05

## 2018-01-01 RX ADMIN — ALBUTEROL SULFATE 2.5 MG: 2.5 SOLUTION RESPIRATORY (INHALATION) at 08:03

## 2018-01-01 RX ADMIN — GABAPENTIN 900 MG: 300 CAPSULE ORAL at 06:02

## 2018-01-01 RX ADMIN — HEPARIN SODIUM 5000 UNITS: 5000 INJECTION, SOLUTION INTRAVENOUS; SUBCUTANEOUS at 05:03

## 2018-01-01 RX ADMIN — ALPRAZOLAM 1 MG: 0.5 TABLET, ORALLY DISINTEGRATING ORAL at 08:04

## 2018-01-01 RX ADMIN — DEXMEDETOMIDINE HYDROCHLORIDE 0.8 MCG/KG/HR: 100 INJECTION, SOLUTION, CONCENTRATE INTRAVENOUS at 12:03

## 2018-01-01 RX ADMIN — TACROLIMUS 2 MG: 1 CAPSULE ORAL at 05:03

## 2018-01-01 RX ADMIN — Medication 24 G: at 01:05

## 2018-01-01 RX ADMIN — MYCOPHENILIC ACID 720 MG: 180 TABLET, DELAYED RELEASE ORAL at 12:03

## 2018-01-01 RX ADMIN — INSULIN ASPART 1 UNITS: 100 INJECTION, SOLUTION INTRAVENOUS; SUBCUTANEOUS at 08:04

## 2018-01-01 RX ADMIN — HYDROCORTISONE SODIUM SUCCINATE 50 MG: 100 INJECTION, POWDER, FOR SOLUTION INTRAMUSCULAR; INTRAVENOUS at 09:03

## 2018-01-01 RX ADMIN — ASPIRIN 81 MG: 81 TABLET, COATED ORAL at 09:06

## 2018-01-01 RX ADMIN — MYCOPHENOLATE MOFETIL 250 MG: 250 CAPSULE ORAL at 02:05

## 2018-01-01 RX ADMIN — TACROLIMUS 1 MG: 1 CAPSULE ORAL at 07:04

## 2018-01-01 RX ADMIN — INSULIN ASPART 1 UNITS: 100 INJECTION, SOLUTION INTRAVENOUS; SUBCUTANEOUS at 10:04

## 2018-01-01 RX ADMIN — GABAPENTIN 900 MG: 300 CAPSULE ORAL at 03:02

## 2018-01-01 RX ADMIN — OXYCODONE HYDROCHLORIDE 10 MG: 5 TABLET ORAL at 06:01

## 2018-01-01 RX ADMIN — CALCIUM CARBONATE (ANTACID) CHEW TAB 500 MG 500 MG: 500 CHEW TAB at 03:05

## 2018-01-01 RX ADMIN — OXYCODONE HYDROCHLORIDE 10 MG: 5 SOLUTION ORAL at 11:04

## 2018-01-01 RX ADMIN — INSULIN DETEMIR 4 UNITS: 100 INJECTION, SOLUTION SUBCUTANEOUS at 08:06

## 2018-01-01 RX ADMIN — ASPIRIN 81 MG: 81 TABLET, COATED ORAL at 08:06

## 2018-01-01 RX ADMIN — SODIUM CHLORIDE 2 UNITS/HR: 9 INJECTION, SOLUTION INTRAVENOUS at 06:04

## 2018-01-01 RX ADMIN — PIPERACILLIN AND TAZOBACTAM 4.5 G: 4; .5 INJECTION, POWDER, LYOPHILIZED, FOR SOLUTION INTRAVENOUS; PARENTERAL at 03:03

## 2018-01-01 RX ADMIN — MIDODRINE HYDROCHLORIDE 10 MG: 5 TABLET ORAL at 07:04

## 2018-01-01 RX ADMIN — ENOXAPARIN SODIUM 30 MG: 100 INJECTION SUBCUTANEOUS at 04:06

## 2018-01-01 RX ADMIN — HYDROCORTISONE SODIUM SUCCINATE 25 MG: 100 INJECTION, POWDER, FOR SOLUTION INTRAMUSCULAR; INTRAVENOUS at 09:04

## 2018-01-01 RX ADMIN — TACROLIMUS 3 MG: 1 CAPSULE ORAL at 06:02

## 2018-01-01 RX ADMIN — INSULIN ASPART 1 UNITS: 100 INJECTION, SOLUTION INTRAVENOUS; SUBCUTANEOUS at 08:05

## 2018-01-01 RX ADMIN — PRAVASTATIN SODIUM 40 MG: 40 TABLET ORAL at 08:02

## 2018-01-01 RX ADMIN — IPRATROPIUM BROMIDE AND ALBUTEROL SULFATE 3 ML: .5; 3 SOLUTION RESPIRATORY (INHALATION) at 03:05

## 2018-01-01 RX ADMIN — FENTANYL 1 PATCH: 25 PATCH, EXTENDED RELEASE TRANSDERMAL at 02:04

## 2018-01-01 RX ADMIN — PREDNISONE 2.5 MG: 2.5 TABLET ORAL at 01:01

## 2018-01-01 RX ADMIN — ESCITALOPRAM 20 MG: 20 TABLET, FILM COATED ORAL at 11:06

## 2018-01-01 RX ADMIN — FUROSEMIDE 80 MG: 10 INJECTION, SOLUTION INTRAMUSCULAR; INTRAVENOUS at 01:03

## 2018-01-01 RX ADMIN — Medication 250 MG: at 11:06

## 2018-01-01 RX ADMIN — SODIUM PHOSPHATE, MONOBASIC, MONOHYDRATE 20.01 MMOL: 276; 142 INJECTION, SOLUTION INTRAVENOUS at 04:06

## 2018-01-01 RX ADMIN — INSULIN ASPART 3 UNITS: 100 INJECTION, SOLUTION INTRAVENOUS; SUBCUTANEOUS at 07:05

## 2018-01-01 RX ADMIN — MAGNESIUM OXIDE TAB 400 MG (241.3 MG ELEMENTAL MG) 400 MG: 400 (241.3 MG) TAB at 09:06

## 2018-01-01 RX ADMIN — SODIUM PHOSPHATE, MONOBASIC, MONOHYDRATE 39.99 MMOL: 276; 142 INJECTION, SOLUTION INTRAVENOUS at 07:06

## 2018-01-01 RX ADMIN — Medication 3 ML: at 03:03

## 2018-01-01 RX ADMIN — SODIUM CHLORIDE 350 ML: 9 INJECTION, SOLUTION INTRAVENOUS at 10:05

## 2018-01-01 RX ADMIN — INSULIN ASPART 2 UNITS: 100 INJECTION, SOLUTION INTRAVENOUS; SUBCUTANEOUS at 08:05

## 2018-01-01 RX ADMIN — INSULIN ASPART 1 UNITS: 100 INJECTION, SOLUTION INTRAVENOUS; SUBCUTANEOUS at 01:04

## 2018-01-01 RX ADMIN — ALPRAZOLAM 0.25 MG: 0.25 TABLET ORAL at 02:04

## 2018-01-01 RX ADMIN — INSULIN ASPART 2 UNITS: 100 INJECTION, SOLUTION INTRAVENOUS; SUBCUTANEOUS at 09:02

## 2018-01-01 RX ADMIN — HYDROXYZINE HYDROCHLORIDE 25 MG: 25 TABLET, FILM COATED ORAL at 08:04

## 2018-01-01 RX ADMIN — LEVALBUTEROL HYDROCHLORIDE 0.63 MG: 0.63 SOLUTION RESPIRATORY (INHALATION) at 08:06

## 2018-01-01 RX ADMIN — LIDOCAINE HYDROCHLORIDE 80 MG: 20 INJECTION, SOLUTION INTRAVENOUS at 07:01

## 2018-01-01 RX ADMIN — GABAPENTIN 100 MG: 100 CAPSULE ORAL at 05:06

## 2018-01-01 RX ADMIN — LEVOTHYROXINE SODIUM 137 MCG: 137 TABLET ORAL at 05:06

## 2018-01-01 RX ADMIN — LEVOTHYROXINE SODIUM 150 MCG: 150 TABLET ORAL at 02:01

## 2018-01-01 RX ADMIN — INSULIN DETEMIR 15 UNITS: 100 INJECTION, SOLUTION SUBCUTANEOUS at 09:05

## 2018-01-01 RX ADMIN — TACROLIMUS 5 MG: 5 CAPSULE ORAL at 07:06

## 2018-01-01 RX ADMIN — DIBASIC SODIUM PHOSPHATE, MONOBASIC POTASSIUM PHOSPHATE AND MONOBASIC SODIUM PHOSPHATE 2 TABLET: 852; 155; 130 TABLET ORAL at 02:03

## 2018-01-01 RX ADMIN — Medication 1 G: at 05:03

## 2018-01-01 RX ADMIN — ERYTHROPOIETIN 10000 UNITS: 10000 INJECTION, SOLUTION INTRAVENOUS; SUBCUTANEOUS at 06:06

## 2018-01-01 RX ADMIN — MYCOPHENOLATE MOFETIL 250 MG: 250 CAPSULE ORAL at 07:05

## 2018-01-01 RX ADMIN — ENOXAPARIN SODIUM 30 MG: 100 INJECTION SUBCUTANEOUS at 05:03

## 2018-01-01 RX ADMIN — IPRATROPIUM BROMIDE AND ALBUTEROL SULFATE 3 ML: .5; 3 SOLUTION RESPIRATORY (INHALATION) at 12:06

## 2018-01-01 RX ADMIN — SODIUM CHLORIDE: 0.9 INJECTION, SOLUTION INTRAVENOUS at 07:05

## 2018-01-01 RX ADMIN — ALPRAZOLAM 0.5 MG: 0.5 TABLET ORAL at 06:05

## 2018-01-01 RX ADMIN — Medication 1 MG: at 01:04

## 2018-01-01 RX ADMIN — INSULIN DETEMIR 15 UNITS: 100 INJECTION, SOLUTION SUBCUTANEOUS at 12:05

## 2018-01-01 RX ADMIN — SODIUM CHLORIDE 3.8 UNITS/HR: 9 INJECTION, SOLUTION INTRAVENOUS at 10:03

## 2018-01-01 RX ADMIN — ERYTHROPOIETIN 4000 UNITS: 4000 INJECTION, SOLUTION INTRAVENOUS; SUBCUTANEOUS at 12:04

## 2018-01-01 RX ADMIN — METRONIDAZOLE 500 MG: 500 INJECTION, SOLUTION INTRAVENOUS at 09:05

## 2018-01-01 RX ADMIN — PIPERACILLIN AND TAZOBACTAM 4.5 G: 4; .5 INJECTION, POWDER, LYOPHILIZED, FOR SOLUTION INTRAVENOUS; PARENTERAL at 12:03

## 2018-01-01 RX ADMIN — NORTRIPTYLINE HYDROCHLORIDE 25 MG: 25 CAPSULE ORAL at 09:03

## 2018-01-01 RX ADMIN — ONDANSETRON 4 MG: 4 TABLET, ORALLY DISINTEGRATING ORAL at 10:05

## 2018-01-01 RX ADMIN — INSULIN DETEMIR 8 UNITS: 100 INJECTION, SOLUTION SUBCUTANEOUS at 09:05

## 2018-01-01 RX ADMIN — MAGNESIUM OXIDE TAB 400 MG (241.3 MG ELEMENTAL MG) 400 MG: 400 (241.3 MG) TAB at 08:02

## 2018-01-01 RX ADMIN — ACETAMINOPHEN 650 MG: 325 TABLET ORAL at 11:05

## 2018-01-01 RX ADMIN — CALCIUM CARBONATE (ANTACID) CHEW TAB 500 MG 1000 MG: 500 CHEW TAB at 12:06

## 2018-01-01 RX ADMIN — LEVOTHYROXINE SODIUM ANHYDROUS 75 MCG: 100 INJECTION, POWDER, LYOPHILIZED, FOR SOLUTION INTRAVENOUS at 04:04

## 2018-01-01 RX ADMIN — INSULIN ASPART 2 UNITS: 100 INJECTION, SOLUTION INTRAVENOUS; SUBCUTANEOUS at 05:04

## 2018-01-01 RX ADMIN — ONDANSETRON 8 MG: 8 TABLET, ORALLY DISINTEGRATING ORAL at 08:04

## 2018-01-01 RX ADMIN — TACROLIMUS 3 MG: 1 CAPSULE ORAL at 08:03

## 2018-01-01 RX ADMIN — METOCLOPRAMIDE 5 MG: 5 INJECTION, SOLUTION INTRAMUSCULAR; INTRAVENOUS at 01:03

## 2018-01-01 RX ADMIN — Medication 2500 MCG: at 02:03

## 2018-01-01 RX ADMIN — OXYCODONE HYDROCHLORIDE 5 MG: 5 TABLET ORAL at 07:05

## 2018-01-01 RX ADMIN — PANTOPRAZOLE SODIUM 40 MG: 40 TABLET, DELAYED RELEASE ORAL at 09:06

## 2018-01-01 RX ADMIN — PIPERACILLIN AND TAZOBACTAM 4.5 G: 4; .5 INJECTION, POWDER, LYOPHILIZED, FOR SOLUTION INTRAVENOUS; PARENTERAL at 07:03

## 2018-01-01 RX ADMIN — SODIUM POLYSTYRENE SULFONATE 30 G: 15 SUSPENSION ORAL; RECTAL at 06:03

## 2018-01-01 RX ADMIN — OXYCODONE HYDROCHLORIDE 5 MG: 5 SOLUTION ORAL at 11:04

## 2018-01-01 RX ADMIN — Medication 2500 MCG: at 04:03

## 2018-01-01 RX ADMIN — ACETAMINOPHEN 650 MG: 325 TABLET ORAL at 07:05

## 2018-01-01 RX ADMIN — TACROLIMUS 3 MG: 1 CAPSULE ORAL at 08:02

## 2018-01-01 RX ADMIN — SODIUM CHLORIDE: 0.9 INJECTION, SOLUTION INTRAVENOUS at 06:06

## 2018-01-01 RX ADMIN — PHENYLEPHRINE HYDROCHLORIDE 100 MCG: 10 INJECTION INTRAVENOUS at 07:01

## 2018-01-01 RX ADMIN — VASOPRESSIN 0.04 UNITS/MIN: 20 INJECTION INTRAVENOUS at 05:03

## 2018-01-01 RX ADMIN — INSULIN ASPART 4 UNITS: 100 INJECTION, SOLUTION INTRAVENOUS; SUBCUTANEOUS at 10:05

## 2018-01-01 RX ADMIN — VANCOMYCIN HYDROCHLORIDE 1250 MG: 1 INJECTION, POWDER, LYOPHILIZED, FOR SOLUTION INTRAVENOUS at 05:03

## 2018-01-01 RX ADMIN — SODIUM CHLORIDE: 0.9 INJECTION, SOLUTION INTRAVENOUS at 02:04

## 2018-01-01 RX ADMIN — TACROLIMUS 3 MG: 1 CAPSULE ORAL at 05:05

## 2018-01-01 RX ADMIN — PREDNISONE 5 MG: 5 TABLET ORAL at 12:05

## 2018-01-01 RX ADMIN — CETIRIZINE HYDROCHLORIDE 5 MG: 5 TABLET, FILM COATED ORAL at 10:05

## 2018-01-01 RX ADMIN — FENTANYL CITRATE 25 MCG: 50 INJECTION, SOLUTION INTRAMUSCULAR; INTRAVENOUS at 07:04

## 2018-01-01 RX ADMIN — SODIUM CHLORIDE 1.5 UNITS/HR: 9 INJECTION, SOLUTION INTRAVENOUS at 06:03

## 2018-01-01 RX ADMIN — CETIRIZINE HYDROCHLORIDE 5 MG: 5 TABLET, FILM COATED ORAL at 07:06

## 2018-01-01 RX ADMIN — DEXMEDETOMIDINE HYDROCHLORIDE 1.2 MCG/KG/HR: 100 INJECTION, SOLUTION, CONCENTRATE INTRAVENOUS at 10:03

## 2018-01-01 RX ADMIN — IPRATROPIUM BROMIDE AND ALBUTEROL SULFATE 3 ML: .5; 3 SOLUTION RESPIRATORY (INHALATION) at 11:05

## 2018-01-01 RX ADMIN — CEFEPIME 1 G: 1 INJECTION, POWDER, FOR SOLUTION INTRAMUSCULAR; INTRAVENOUS at 05:05

## 2018-01-01 RX ADMIN — LEVALBUTEROL HYDROCHLORIDE 0.63 MG: 0.63 SOLUTION RESPIRATORY (INHALATION) at 12:05

## 2018-01-01 RX ADMIN — INSULIN ASPART 2 UNITS: 100 INJECTION, SOLUTION INTRAVENOUS; SUBCUTANEOUS at 12:05

## 2018-01-01 RX ADMIN — PROPOFOL 40 MCG/KG/MIN: 10 INJECTION, EMULSION INTRAVENOUS at 05:03

## 2018-01-01 RX ADMIN — OXYCODONE HYDROCHLORIDE 5 MG: 5 SOLUTION ORAL at 10:04

## 2018-01-01 RX ADMIN — TACROLIMUS 2 MG: 1 CAPSULE ORAL at 09:04

## 2018-01-01 RX ADMIN — RAMELTEON 8 MG: 8 TABLET, FILM COATED ORAL at 09:04

## 2018-01-01 RX ADMIN — INSULIN DETEMIR 5 UNITS: 100 INJECTION, SOLUTION SUBCUTANEOUS at 08:03

## 2018-01-01 RX ADMIN — ESCITALOPRAM 20 MG: 20 TABLET, FILM COATED ORAL at 06:01

## 2018-01-01 RX ADMIN — Medication 1 G: at 04:03

## 2018-01-01 RX ADMIN — SODIUM PHOSPHATE, MONOBASIC, MONOHYDRATE 30 MMOL: 276; 142 INJECTION, SOLUTION INTRAVENOUS at 01:04

## 2018-01-01 RX ADMIN — INSULIN ASPART 2 UNITS: 100 INJECTION, SOLUTION INTRAVENOUS; SUBCUTANEOUS at 12:02

## 2018-01-01 RX ADMIN — AZITHROMYCIN 500 MG: 250 TABLET, FILM COATED ORAL at 03:02

## 2018-01-01 RX ADMIN — HYDROCORTISONE SODIUM SUCCINATE 25 MG: 100 INJECTION, POWDER, FOR SOLUTION INTRAMUSCULAR; INTRAVENOUS at 10:03

## 2018-01-01 RX ADMIN — INSULIN DETEMIR 15 UNITS: 100 INJECTION, SOLUTION SUBCUTANEOUS at 10:02

## 2018-01-01 RX ADMIN — INSULIN ASPART 1 UNITS: 100 INJECTION, SOLUTION INTRAVENOUS; SUBCUTANEOUS at 04:04

## 2018-01-01 RX ADMIN — ACETAMINOPHEN 650 MG: 650 SOLUTION ORAL at 09:03

## 2018-01-01 RX ADMIN — GABAPENTIN 900 MG: 300 CAPSULE ORAL at 06:01

## 2018-01-01 RX ADMIN — EPOETIN ALFA 20000 UNITS: 20000 SOLUTION INTRAVENOUS; SUBCUTANEOUS at 03:02

## 2018-01-01 RX ADMIN — SODIUM CHLORIDE 3.77 UNITS/HR: 9 INJECTION, SOLUTION INTRAVENOUS at 09:03

## 2018-01-01 RX ADMIN — SODIUM CHLORIDE 0.2 UNITS/HR: 9 INJECTION, SOLUTION INTRAVENOUS at 05:04

## 2018-01-01 RX ADMIN — FENTANYL CITRATE 50 MCG: 50 INJECTION, SOLUTION INTRAMUSCULAR; INTRAVENOUS at 12:06

## 2018-01-01 RX ADMIN — LACTULOSE 20 G: 20 SOLUTION ORAL at 10:04

## 2018-01-01 RX ADMIN — TAMSULOSIN HYDROCHLORIDE 0.4 MG: 0.4 CAPSULE ORAL at 01:03

## 2018-01-01 RX ADMIN — SODIUM CHLORIDE 1.8 UNITS/HR: 9 INJECTION, SOLUTION INTRAVENOUS at 01:04

## 2018-01-01 RX ADMIN — AZITHROMYCIN MONOHYDRATE 500 MG: 500 INJECTION, POWDER, LYOPHILIZED, FOR SOLUTION INTRAVENOUS at 12:03

## 2018-01-01 RX ADMIN — LEVOTHYROXINE SODIUM ANHYDROUS 75 MCG: 100 INJECTION, POWDER, LYOPHILIZED, FOR SOLUTION INTRAVENOUS at 11:04

## 2018-01-01 RX ADMIN — MAGNESIUM OXIDE TAB 400 MG (241.3 MG ELEMENTAL MG) 400 MG: 400 (241.3 MG) TAB at 09:01

## 2018-01-01 RX ADMIN — BENZONATATE 100 MG: 100 CAPSULE ORAL at 02:03

## 2018-01-01 RX ADMIN — TACROLIMUS 1 MG: 1 CAPSULE ORAL at 06:03

## 2018-01-01 RX ADMIN — INSULIN ASPART 2 UNITS: 100 INJECTION, SOLUTION INTRAVENOUS; SUBCUTANEOUS at 01:04

## 2018-01-01 RX ADMIN — DIBASIC SODIUM PHOSPHATE, MONOBASIC POTASSIUM PHOSPHATE AND MONOBASIC SODIUM PHOSPHATE 2 TABLET: 852; 155; 130 TABLET ORAL at 05:03

## 2018-01-01 RX ADMIN — SODIUM CHLORIDE: 0.9 INJECTION, SOLUTION INTRAVENOUS at 10:03

## 2018-01-01 RX ADMIN — ALBUMIN HUMAN 25 G: 0.25 SOLUTION INTRAVENOUS at 02:06

## 2018-01-01 RX ADMIN — ONDANSETRON 8 MG: 2 INJECTION INTRAMUSCULAR; INTRAVENOUS at 11:02

## 2018-01-01 RX ADMIN — SODIUM CHLORIDE 300 ML: 0.9 INJECTION, SOLUTION INTRAVENOUS at 04:04

## 2018-01-01 RX ADMIN — PREDNISONE 2.5 MG: 2.5 TABLET ORAL at 06:06

## 2018-01-01 RX ADMIN — ESCITALOPRAM OXALATE 20 MG: 10 TABLET ORAL at 06:05

## 2018-01-01 RX ADMIN — GLYCOPYRROLATE 0.6 MG: 0.2 INJECTION, SOLUTION INTRAMUSCULAR; INTRAVENOUS at 08:01

## 2018-01-01 RX ADMIN — SODIUM CHLORIDE 500 ML: 0.9 INJECTION, SOLUTION INTRAVENOUS at 09:03

## 2018-01-01 RX ADMIN — SODIUM CHLORIDE: 0.9 INJECTION, SOLUTION INTRAVENOUS at 08:05

## 2018-01-01 RX ADMIN — PROPOFOL 5 MCG/KG/MIN: 10 INJECTION, EMULSION INTRAVENOUS at 11:03

## 2018-01-01 RX ADMIN — GUAIFENESIN 600 MG: 600 TABLET, EXTENDED RELEASE ORAL at 09:04

## 2018-01-01 RX ADMIN — ESCITALOPRAM OXALATE 20 MG: 10 TABLET ORAL at 01:04

## 2018-01-01 RX ADMIN — BENZONATATE 100 MG: 100 CAPSULE ORAL at 10:05

## 2018-01-01 RX ADMIN — OXYCODONE HYDROCHLORIDE 5 MG: 5 SOLUTION ORAL at 07:04

## 2018-01-01 RX ADMIN — ESCITALOPRAM OXALATE 20 MG: 10 TABLET ORAL at 10:04

## 2018-01-01 RX ADMIN — POTASSIUM & SODIUM PHOSPHATES POWDER PACK 280-160-250 MG 2 PACKET: 280-160-250 PACK at 09:05

## 2018-01-01 RX ADMIN — PANTOPRAZOLE SODIUM 40 MG: 40 TABLET, DELAYED RELEASE ORAL at 10:05

## 2018-01-01 RX ADMIN — LEVOTHYROXINE SODIUM 137 MCG: 137 TABLET ORAL at 06:06

## 2018-01-01 RX ADMIN — HEPARIN SODIUM 1000 UNITS: 1000 INJECTION, SOLUTION INTRAVENOUS; SUBCUTANEOUS at 09:05

## 2018-01-01 RX ADMIN — Medication 2 G: at 03:03

## 2018-01-01 RX ADMIN — LEVALBUTEROL HYDROCHLORIDE 0.63 MG: 0.63 SOLUTION RESPIRATORY (INHALATION) at 05:05

## 2018-01-01 RX ADMIN — CHLORHEXIDINE GLUCONATE 15 ML: 1.2 RINSE ORAL at 08:06

## 2018-01-01 RX ADMIN — DEXMEDETOMIDINE HYDROCHLORIDE 0.2 MCG/KG/HR: 4 INJECTION, SOLUTION INTRAVENOUS at 11:06

## 2018-01-01 RX ADMIN — SODIUM CHLORIDE 350 ML: 0.9 INJECTION, SOLUTION INTRAVENOUS at 10:04

## 2018-01-01 RX ADMIN — TACROLIMUS 2 MG: 1 CAPSULE ORAL at 11:04

## 2018-01-01 RX ADMIN — NORTRIPTYLINE HYDROCHLORIDE 25 MG: 25 CAPSULE ORAL at 09:02

## 2018-01-01 RX ADMIN — LEVOTHYROXINE SODIUM 125 MCG: 25 TABLET ORAL at 06:02

## 2018-01-01 RX ADMIN — INSULIN ASPART 8 UNITS: 100 INJECTION, SOLUTION INTRAVENOUS; SUBCUTANEOUS at 12:05

## 2018-01-01 RX ADMIN — FENTANYL CITRATE 100 MCG: 50 INJECTION, SOLUTION INTRAMUSCULAR; INTRAVENOUS at 09:03

## 2018-01-01 RX ADMIN — Medication 1 MG: at 09:04

## 2018-01-01 RX ADMIN — CETIRIZINE HYDROCHLORIDE 5 MG: 5 TABLET, FILM COATED ORAL at 05:05

## 2018-01-01 RX ADMIN — FUROSEMIDE 20 MG/HR: 10 INJECTION, SOLUTION INTRAMUSCULAR; INTRAVENOUS at 01:03

## 2018-01-01 RX ADMIN — CETIRIZINE HYDROCHLORIDE 5 MG: 5 TABLET, FILM COATED ORAL at 10:04

## 2018-01-01 RX ADMIN — HEPARIN SODIUM 5000 UNITS: 5000 INJECTION, SOLUTION INTRAVENOUS; SUBCUTANEOUS at 10:04

## 2018-01-01 RX ADMIN — PROPOFOL 100 MG: 10 INJECTION, EMULSION INTRAVENOUS at 09:03

## 2018-01-01 RX ADMIN — FENTANYL CITRATE 50 MCG: 50 INJECTION, SOLUTION INTRAMUSCULAR; INTRAVENOUS at 05:03

## 2018-01-01 RX ADMIN — SODIUM PHOSPHATE, MONOBASIC, MONOHYDRATE 30 MMOL: 276; 142 INJECTION, SOLUTION INTRAVENOUS at 04:03

## 2018-01-01 RX ADMIN — SODIUM CHLORIDE 1.5 UNITS/HR: 9 INJECTION, SOLUTION INTRAVENOUS at 12:04

## 2018-01-01 RX ADMIN — ENOXAPARIN SODIUM 30 MG: 100 INJECTION SUBCUTANEOUS at 11:06

## 2018-01-01 RX ADMIN — OXYCODONE HYDROCHLORIDE 5 MG: 5 TABLET ORAL at 09:01

## 2018-01-01 RX ADMIN — INSULIN ASPART 3 UNITS: 100 INJECTION, SOLUTION INTRAVENOUS; SUBCUTANEOUS at 09:06

## 2018-01-01 RX ADMIN — SODIUM PHOSPHATE, MONOBASIC, MONOHYDRATE 39.99 MMOL: 276; 142 INJECTION, SOLUTION INTRAVENOUS at 06:06

## 2018-01-01 RX ADMIN — Medication 1 G: at 06:03

## 2018-01-01 RX ADMIN — Medication 4 MG: at 05:04

## 2018-01-01 RX ADMIN — HEPARIN SODIUM 5000 UNITS: 5000 INJECTION, SOLUTION INTRAVENOUS; SUBCUTANEOUS at 10:02

## 2018-01-01 RX ADMIN — DIBASIC SODIUM PHOSPHATE, MONOBASIC POTASSIUM PHOSPHATE AND MONOBASIC SODIUM PHOSPHATE 2 TABLET: 852; 155; 130 TABLET ORAL at 10:03

## 2018-01-01 RX ADMIN — PRAVASTATIN SODIUM 40 MG: 20 TABLET ORAL at 08:03

## 2018-01-01 RX ADMIN — POSACONAZOLE 300 MG: 100 TABLET, COATED ORAL at 05:03

## 2018-01-01 RX ADMIN — PREDNISONE 5 MG: 5 TABLET ORAL at 06:05

## 2018-01-01 RX ADMIN — NITROGLYCERIN 0.5 INCH: 20 OINTMENT TOPICAL at 11:04

## 2018-01-01 RX ADMIN — ALPRAZOLAM 0.5 MG: 0.5 TABLET, ORALLY DISINTEGRATING ORAL at 05:04

## 2018-01-01 RX ADMIN — AZITHROMYCIN MONOHYDRATE 500 MG: 500 INJECTION, POWDER, LYOPHILIZED, FOR SOLUTION INTRAVENOUS at 09:03

## 2018-01-01 RX ADMIN — MYCOPHENILIC ACID 720 MG: 180 TABLET, DELAYED RELEASE ORAL at 10:02

## 2018-01-01 RX ADMIN — GABAPENTIN 600 MG: 300 CAPSULE ORAL at 08:03

## 2018-01-01 RX ADMIN — INSULIN ASPART 2 UNITS: 100 INJECTION, SOLUTION INTRAVENOUS; SUBCUTANEOUS at 08:02

## 2018-01-01 RX ADMIN — INSULIN DETEMIR 3 UNITS: 100 INJECTION, SOLUTION SUBCUTANEOUS at 03:04

## 2018-01-01 RX ADMIN — INSULIN ASPART 2 UNITS: 100 INJECTION, SOLUTION INTRAVENOUS; SUBCUTANEOUS at 05:05

## 2018-01-01 RX ADMIN — TORSEMIDE 40 MG: 20 TABLET ORAL at 09:01

## 2018-01-01 RX ADMIN — Medication 5 MG: at 03:06

## 2018-01-01 RX ADMIN — PROCHLORPERAZINE EDISYLATE 10 MG: 5 INJECTION INTRAMUSCULAR; INTRAVENOUS at 02:05

## 2018-01-01 RX ADMIN — Medication 3 ML: at 07:04

## 2018-01-01 RX ADMIN — PREDNISONE 5 MG: 2.5 TABLET ORAL at 11:06

## 2018-01-01 RX ADMIN — TORSEMIDE 40 MG: 20 TABLET ORAL at 02:01

## 2018-01-01 RX ADMIN — PREDNISONE 7.5 MG: 2.5 TABLET ORAL at 09:04

## 2018-01-01 RX ADMIN — SODIUM CHLORIDE: 0.9 INJECTION, SOLUTION INTRAVENOUS at 08:06

## 2018-01-01 RX ADMIN — INSULIN ASPART 5 UNITS: 100 INJECTION, SOLUTION INTRAVENOUS; SUBCUTANEOUS at 05:04

## 2018-01-01 RX ADMIN — PREDNISONE 5 MG: 2.5 TABLET ORAL at 08:06

## 2018-01-01 RX ADMIN — MIDAZOLAM HYDROCHLORIDE 3 MG/HR: 5 INJECTION, SOLUTION INTRAMUSCULAR; INTRAVENOUS at 05:03

## 2018-01-01 RX ADMIN — GABAPENTIN 900 MG: 300 CAPSULE ORAL at 08:03

## 2018-01-01 RX ADMIN — GANCICLOVIR SODIUM 100 MG: 500 INJECTION, POWDER, LYOPHILIZED, FOR SOLUTION INTRAVENOUS at 05:03

## 2018-01-01 RX ADMIN — PHENYLEPHRINE HYDROCHLORIDE 200 MCG: 10 INJECTION INTRAVENOUS at 10:04

## 2018-01-01 RX ADMIN — SODIUM CHLORIDE 1.3 UNITS/HR: 9 INJECTION, SOLUTION INTRAVENOUS at 06:05

## 2018-01-01 RX ADMIN — PREDNISONE 2.5 MG: 2.5 TABLET ORAL at 09:05

## 2018-01-01 RX ADMIN — SODIUM PHOSPHATE, MONOBASIC, MONOHYDRATE 20.01 MMOL: 276; 142 INJECTION, SOLUTION INTRAVENOUS at 06:05

## 2018-01-01 RX ADMIN — LACTULOSE 200 G: 10 SOLUTION ORAL at 10:05

## 2018-01-01 RX ADMIN — MOXIFLOXACIN HYDROCHLORIDE 400 MG: 400 TABLET, FILM COATED ORAL at 08:05

## 2018-01-01 RX ADMIN — Medication 2 G: at 05:03

## 2018-01-01 RX ADMIN — INSULIN ASPART 4 UNITS: 100 INJECTION, SOLUTION INTRAVENOUS; SUBCUTANEOUS at 02:04

## 2018-01-01 RX ADMIN — SODIUM CHLORIDE 10 ML/HR: 0.9 INJECTION, SOLUTION INTRAVENOUS at 06:01

## 2018-01-01 RX ADMIN — ACETAMINOPHEN 999.01 MG: 650 SOLUTION ORAL at 10:04

## 2018-01-01 RX ADMIN — ACETAMINOPHEN 999.01 MG: 650 SOLUTION ORAL at 01:04

## 2018-01-01 RX ADMIN — POLYETHYLENE GLYCOL 3350 17 G: 17 POWDER, FOR SOLUTION ORAL at 08:05

## 2018-01-01 RX ADMIN — INSULIN ASPART 4 UNITS: 100 INJECTION, SOLUTION INTRAVENOUS; SUBCUTANEOUS at 12:04

## 2018-01-01 RX ADMIN — MIDODRINE HYDROCHLORIDE 2.5 MG: 2.5 TABLET ORAL at 07:04

## 2018-01-01 RX ADMIN — TACROLIMUS 3 MG: 1 CAPSULE ORAL at 09:04

## 2018-01-01 RX ADMIN — PROPOFOL 150 MG: 10 INJECTION, EMULSION INTRAVENOUS at 07:01

## 2018-01-01 RX ADMIN — LINEZOLID 600 MG: 600 INJECTION, SOLUTION INTRAVENOUS at 08:03

## 2018-01-01 RX ADMIN — PREDNISONE 5 MG: 5 TABLET ORAL at 02:05

## 2018-01-01 RX ADMIN — TACROLIMUS 3 MG: 1 CAPSULE ORAL at 07:02

## 2018-01-01 RX ADMIN — ERYTHROPOIETIN 10000 UNITS: 10000 INJECTION, SOLUTION INTRAVENOUS; SUBCUTANEOUS at 02:06

## 2018-01-01 RX ADMIN — AZITHROMYCIN MONOHYDRATE 500 MG: 500 INJECTION, POWDER, LYOPHILIZED, FOR SOLUTION INTRAVENOUS at 01:03

## 2018-01-01 RX ADMIN — SODIUM CHLORIDE, PRESERVATIVE FREE 3 ML: 5 INJECTION INTRAVENOUS at 10:01

## 2018-01-01 RX ADMIN — TACROLIMUS 5 MG: 5 CAPSULE ORAL at 06:05

## 2018-01-01 RX ADMIN — MIDODRINE HYDROCHLORIDE 10 MG: 5 TABLET ORAL at 04:04

## 2018-01-01 RX ADMIN — INSULIN ASPART 2 UNITS: 100 INJECTION, SOLUTION INTRAVENOUS; SUBCUTANEOUS at 10:04

## 2018-01-01 RX ADMIN — LORAZEPAM 1 MG: 2 INJECTION INTRAMUSCULAR; INTRAVENOUS at 04:06

## 2018-01-01 RX ADMIN — GUAIFENESIN AND DEXTROMETHORPHAN 5 ML: 100; 10 SYRUP ORAL at 04:04

## 2018-01-01 RX ADMIN — GABAPENTIN 100 MG: 100 CAPSULE ORAL at 07:06

## 2018-01-01 RX ADMIN — LEVOTHYROXINE SODIUM ANHYDROUS 75 MCG: 100 INJECTION, POWDER, LYOPHILIZED, FOR SOLUTION INTRAVENOUS at 06:04

## 2018-01-01 RX ADMIN — SODIUM CHLORIDE 3.54 UNITS/HR: 9 INJECTION, SOLUTION INTRAVENOUS at 06:03

## 2018-01-01 RX ADMIN — MIDODRINE HYDROCHLORIDE 2.5 MG: 2.5 TABLET ORAL at 10:04

## 2018-01-01 RX ADMIN — LEVALBUTEROL HYDROCHLORIDE 0.63 MG: 0.63 SOLUTION RESPIRATORY (INHALATION) at 04:05

## 2018-01-01 RX ADMIN — DEXTROSE MONOHYDRATE 12.5 G: 25 INJECTION, SOLUTION INTRAVENOUS at 10:05

## 2018-01-01 RX ADMIN — LEVOTHYROXINE SODIUM 150 MCG: 100 TABLET ORAL at 07:03

## 2018-01-01 RX ADMIN — CEFAZOLIN 2 G: 330 INJECTION, POWDER, FOR SOLUTION INTRAMUSCULAR; INTRAVENOUS at 08:01

## 2018-01-01 RX ADMIN — Medication 3 MG: at 07:04

## 2018-01-01 RX ADMIN — HEPARIN SODIUM 5000 UNITS: 5000 INJECTION, SOLUTION INTRAVENOUS; SUBCUTANEOUS at 08:06

## 2018-01-01 RX ADMIN — OXYCODONE HYDROCHLORIDE 5 MG: 5 TABLET ORAL at 08:06

## 2018-01-01 RX ADMIN — MYCOPHENOLATE MOFETIL 250 MG: 250 CAPSULE ORAL at 06:05

## 2018-01-01 RX ADMIN — SODIUM CHLORIDE, SODIUM GLUCONATE, SODIUM ACETATE, POTASSIUM CHLORIDE, MAGNESIUM CHLORIDE, SODIUM PHOSPHATE, DIBASIC, AND POTASSIUM PHOSPHATE: .53; .5; .37; .037; .03; .012; .00082 INJECTION, SOLUTION INTRAVENOUS at 10:04

## 2018-01-01 RX ADMIN — ACETAMINOPHEN 650 MG: 325 TABLET ORAL at 09:06

## 2018-01-01 RX ADMIN — LEVALBUTEROL HYDROCHLORIDE 0.63 MG: 0.63 SOLUTION RESPIRATORY (INHALATION) at 06:05

## 2018-01-01 RX ADMIN — PREDNISONE 5 MG: 5 TABLET ORAL at 08:04

## 2018-01-01 RX ADMIN — INSULIN ASPART 2 UNITS: 100 INJECTION, SOLUTION INTRAVENOUS; SUBCUTANEOUS at 07:04

## 2018-01-01 RX ADMIN — PREDNISONE 5 MG: 5 TABLET ORAL at 01:04

## 2018-01-01 RX ADMIN — HEPARIN SODIUM 1000 UNITS: 1000 INJECTION, SOLUTION INTRAVENOUS; SUBCUTANEOUS at 11:04

## 2018-01-01 RX ADMIN — ENOXAPARIN SODIUM 30 MG: 100 INJECTION SUBCUTANEOUS at 03:06

## 2018-01-01 RX ADMIN — SODIUM CHLORIDE: 0.9 INJECTION, SOLUTION INTRAVENOUS at 05:05

## 2018-01-01 RX ADMIN — INSULIN ASPART 2 UNITS: 100 INJECTION, SOLUTION INTRAVENOUS; SUBCUTANEOUS at 08:03

## 2018-01-01 RX ADMIN — NOREPINEPHRINE BITARTRATE 0.55 MCG/KG/MIN: 1 INJECTION, SOLUTION, CONCENTRATE INTRAVENOUS at 07:03

## 2018-01-01 RX ADMIN — NOREPINEPHRINE BITARTRATE 0.25 MCG/KG/MIN: 1 INJECTION, SOLUTION, CONCENTRATE INTRAVENOUS at 09:03

## 2018-01-01 RX ADMIN — ALBUTEROL SULFATE 10 MG: 2.5 SOLUTION RESPIRATORY (INHALATION) at 06:03

## 2018-01-01 RX ADMIN — PROMETHAZINE HYDROCHLORIDE 25 MG: 25 INJECTION INTRAMUSCULAR; INTRAVENOUS at 09:05

## 2018-01-01 RX ADMIN — BENZONATATE 200 MG: 100 CAPSULE ORAL at 09:03

## 2018-01-01 RX ADMIN — PRAVASTATIN SODIUM 40 MG: 40 TABLET ORAL at 07:06

## 2018-01-01 RX ADMIN — INSULIN ASPART 1 UNITS: 100 INJECTION, SOLUTION INTRAVENOUS; SUBCUTANEOUS at 11:04

## 2018-01-01 RX ADMIN — HYDROCORTISONE SODIUM SUCCINATE 100 MG: 100 INJECTION, POWDER, FOR SOLUTION INTRAMUSCULAR; INTRAVENOUS at 02:03

## 2018-01-01 RX ADMIN — ONDANSETRON 8 MG: 8 TABLET, ORALLY DISINTEGRATING ORAL at 03:06

## 2018-01-01 RX ADMIN — INSULIN ASPART 2 UNITS: 100 INJECTION, SOLUTION INTRAVENOUS; SUBCUTANEOUS at 03:04

## 2018-01-01 RX ADMIN — MYCOPHENOLIC ACID 720 MG: 180 TABLET, DELAYED RELEASE ORAL at 09:01

## 2018-01-01 RX ADMIN — ACETAMINOPHEN 650 MG: 650 SOLUTION ORAL at 11:03

## 2018-01-01 RX ADMIN — HEPARIN SODIUM 1000 UNITS: 1000 INJECTION, SOLUTION INTRAVENOUS; SUBCUTANEOUS at 05:04

## 2018-01-01 RX ADMIN — ALPRAZOLAM 0.5 MG: 0.5 TABLET ORAL at 07:05

## 2018-01-01 RX ADMIN — TACROLIMUS 1 MG: 1 CAPSULE ORAL at 05:03

## 2018-01-01 RX ADMIN — FENTANYL CITRATE 25 MCG: 50 INJECTION, SOLUTION INTRAMUSCULAR; INTRAVENOUS at 03:04

## 2018-01-01 RX ADMIN — GABAPENTIN 100 MG: 100 CAPSULE ORAL at 01:05

## 2018-01-01 RX ADMIN — TACROLIMUS 3 MG: 1 CAPSULE ORAL at 05:01

## 2018-01-01 RX ADMIN — RAMELTEON 8 MG: 8 TABLET, FILM COATED ORAL at 02:04

## 2018-01-01 RX ADMIN — ONDANSETRON 4 MG: 4 TABLET, ORALLY DISINTEGRATING ORAL at 04:05

## 2018-01-01 RX ADMIN — TACROLIMUS 2 MG: 1 CAPSULE ORAL at 10:04

## 2018-01-01 RX ADMIN — ESCITALOPRAM OXALATE 10 MG: 10 TABLET ORAL at 09:04

## 2018-01-01 RX ADMIN — LIDOCAINE HYDROCHLORIDE 20 ML: 10 INJECTION, SOLUTION INFILTRATION; PERINEURAL at 12:06

## 2018-01-01 RX ADMIN — TACROLIMUS 0.5 MG: 0.5 CAPSULE ORAL at 06:04

## 2018-01-01 RX ADMIN — PROMETHAZINE HYDROCHLORIDE AND CODEINE PHOSPHATE 5 ML: 10; 6.25 SOLUTION ORAL at 10:03

## 2018-01-01 RX ADMIN — GABAPENTIN 300 MG: 300 CAPSULE ORAL at 08:06

## 2018-01-01 RX ADMIN — ROCURONIUM BROMIDE 35 MG: 10 INJECTION, SOLUTION INTRAVENOUS at 07:01

## 2018-01-01 RX ADMIN — INSULIN ASPART 1 UNITS: 100 INJECTION, SOLUTION INTRAVENOUS; SUBCUTANEOUS at 09:06

## 2018-01-01 RX ADMIN — TACROLIMUS 3 MG: 1 CAPSULE ORAL at 10:05

## 2018-01-01 RX ADMIN — IPRATROPIUM BROMIDE AND ALBUTEROL SULFATE 3 ML: .5; 3 SOLUTION RESPIRATORY (INHALATION) at 08:05

## 2018-01-01 RX ADMIN — INSULIN ASPART 4 UNITS: 100 INJECTION, SOLUTION INTRAVENOUS; SUBCUTANEOUS at 06:02

## 2018-01-01 RX ADMIN — GABAPENTIN 900 MG: 300 CAPSULE ORAL at 02:03

## 2018-01-01 RX ADMIN — CEFEPIME 1 G: 1 INJECTION, POWDER, FOR SOLUTION INTRAMUSCULAR; INTRAVENOUS at 12:06

## 2018-01-01 RX ADMIN — Medication 3 ML: at 02:02

## 2018-01-01 RX ADMIN — METOCLOPRAMIDE 5 MG: 5 INJECTION, SOLUTION INTRAMUSCULAR; INTRAVENOUS at 11:03

## 2018-01-01 RX ADMIN — OXYCODONE HYDROCHLORIDE 10 MG: 5 SOLUTION ORAL at 01:04

## 2018-01-01 RX ADMIN — SODIUM CHLORIDE 1.3 UNITS/HR: 9 INJECTION, SOLUTION INTRAVENOUS at 12:04

## 2018-01-01 RX ADMIN — DEXTROSE MONOHYDRATE 25 G: 25 INJECTION, SOLUTION INTRAVENOUS at 05:06

## 2018-01-01 RX ADMIN — TACROLIMUS 4 MG: 1 CAPSULE ORAL at 08:04

## 2018-01-01 RX ADMIN — PREDNISONE 2.5 MG: 2.5 TABLET ORAL at 01:03

## 2018-01-01 RX ADMIN — FAMOTIDINE 20 MG: 20 TABLET, FILM COATED ORAL at 07:04

## 2018-01-01 RX ADMIN — ERYTHROPOIETIN 4000 UNITS: 4000 INJECTION, SOLUTION INTRAVENOUS; SUBCUTANEOUS at 10:04

## 2018-01-01 RX ADMIN — HEPARIN SODIUM 1000 UNITS: 1000 INJECTION, SOLUTION INTRAVENOUS; SUBCUTANEOUS at 02:05

## 2018-01-01 RX ADMIN — GUAIFENESIN 600 MG: 600 TABLET, EXTENDED RELEASE ORAL at 09:05

## 2018-01-01 RX ADMIN — POLYETHYLENE GLYCOL 3350 17 G: 17 POWDER, FOR SOLUTION ORAL at 11:04

## 2018-01-01 RX ADMIN — POLYETHYLENE GLYCOL 3350 17 G: 17 POWDER, FOR SOLUTION ORAL at 04:05

## 2018-01-01 RX ADMIN — FENTANYL CITRATE 50 MCG: 50 INJECTION, SOLUTION INTRAMUSCULAR; INTRAVENOUS at 01:03

## 2018-01-01 RX ADMIN — NOREPINEPHRINE BITARTRATE 0.5 MCG/KG/MIN: 1 INJECTION, SOLUTION, CONCENTRATE INTRAVENOUS at 01:03

## 2018-01-01 RX ADMIN — INSULIN DETEMIR 8 UNITS: 100 INJECTION, SOLUTION SUBCUTANEOUS at 06:05

## 2018-01-01 RX ADMIN — OXYCODONE HYDROCHLORIDE 10 MG: 5 SOLUTION ORAL at 12:04

## 2018-01-01 RX ADMIN — SODIUM CHLORIDE 0.7 UNITS/HR: 9 INJECTION, SOLUTION INTRAVENOUS at 05:04

## 2018-01-01 RX ADMIN — INSULIN ASPART 1 UNITS: 100 INJECTION, SOLUTION INTRAVENOUS; SUBCUTANEOUS at 02:05

## 2018-01-01 RX ADMIN — VANCOMYCIN HYDROCHLORIDE 1 G: 10 INJECTION, POWDER, LYOPHILIZED, FOR SOLUTION INTRAVENOUS at 05:06

## 2018-01-01 RX ADMIN — INSULIN ASPART 2 UNITS: 100 INJECTION, SOLUTION INTRAVENOUS; SUBCUTANEOUS at 03:02

## 2018-01-01 RX ADMIN — ALPRAZOLAM 0.5 MG: 0.5 TABLET ORAL at 01:04

## 2018-01-01 RX ADMIN — MIDODRINE HYDROCHLORIDE 10 MG: 5 TABLET ORAL at 01:04

## 2018-01-01 RX ADMIN — SODIUM PHOSPHATE, MONOBASIC, MONOHYDRATE 20.01 MMOL: 276; 142 INJECTION, SOLUTION INTRAVENOUS at 05:03

## 2018-01-01 RX ADMIN — SODIUM CHLORIDE 0.6 UNITS/HR: 9 INJECTION, SOLUTION INTRAVENOUS at 06:05

## 2018-01-01 RX ADMIN — GABAPENTIN 900 MG: 300 CAPSULE ORAL at 03:03

## 2018-01-01 RX ADMIN — POLYETHYLENE GLYCOL 3350, SODIUM SULFATE ANHYDROUS, SODIUM BICARBONATE, SODIUM CHLORIDE, POTASSIUM CHLORIDE 4000 ML: 236; 22.74; 6.74; 5.86; 2.97 POWDER, FOR SOLUTION ORAL at 05:03

## 2018-01-01 RX ADMIN — DEXMEDETOMIDINE HYDROCHLORIDE 0.6 MCG/KG/HR: 100 INJECTION, SOLUTION, CONCENTRATE INTRAVENOUS at 05:03

## 2018-01-01 RX ADMIN — HEPARIN SODIUM 5000 UNITS: 5000 INJECTION, SOLUTION INTRAVENOUS; SUBCUTANEOUS at 07:05

## 2018-01-01 RX ADMIN — INSULIN DETEMIR 10 UNITS: 100 INJECTION, SOLUTION SUBCUTANEOUS at 01:03

## 2018-01-01 RX ADMIN — LEVALBUTEROL HYDROCHLORIDE 0.63 MG: 0.63 SOLUTION RESPIRATORY (INHALATION) at 11:06

## 2018-01-01 RX ADMIN — SODIUM CHLORIDE 1 UNITS/HR: 9 INJECTION, SOLUTION INTRAVENOUS at 12:04

## 2018-01-01 RX ADMIN — PREDNISONE 2.5 MG: 2.5 TABLET ORAL at 09:03

## 2018-01-01 RX ADMIN — LORAZEPAM 1 MG: 2 INJECTION INTRAMUSCULAR; INTRAVENOUS at 05:06

## 2018-01-01 RX ADMIN — ACETAMINOPHEN 650 MG: 325 TABLET ORAL at 07:06

## 2018-01-01 RX ADMIN — MIDODRINE HYDROCHLORIDE 2.5 MG: 2.5 TABLET ORAL at 11:04

## 2018-01-01 RX ADMIN — Medication 5 UNITS: at 03:03

## 2018-01-01 RX ADMIN — Medication 3 MG: at 08:04

## 2018-01-01 RX ADMIN — ESCITALOPRAM 20 MG: 5 TABLET, FILM COATED ORAL at 07:06

## 2018-01-01 RX ADMIN — PREDNISONE 5 MG: 5 TABLET ORAL at 07:04

## 2018-01-01 RX ADMIN — SENNOSIDES 5 ML: 8.8 SYRUP ORAL at 10:04

## 2018-01-01 RX ADMIN — INSULIN ASPART 3 UNITS: 100 INJECTION, SOLUTION INTRAVENOUS; SUBCUTANEOUS at 10:05

## 2018-01-01 RX ADMIN — METHYLPREDNISOLONE SODIUM SUCCINATE 80 MG: 40 INJECTION, POWDER, LYOPHILIZED, FOR SOLUTION INTRAMUSCULAR; INTRAVENOUS at 06:05

## 2018-01-01 RX ADMIN — VANCOMYCIN HYDROCHLORIDE 2000 MG: 1 INJECTION, POWDER, LYOPHILIZED, FOR SOLUTION INTRAVENOUS at 11:02

## 2018-01-01 RX ADMIN — CALCIUM CARBONATE (ANTACID) CHEW TAB 500 MG 500 MG: 500 CHEW TAB at 11:05

## 2018-01-01 RX ADMIN — ROCURONIUM BROMIDE 10 MG: 10 INJECTION, SOLUTION INTRAVENOUS at 11:04

## 2018-01-01 RX ADMIN — VANCOMYCIN HYDROCHLORIDE 1 G: 10 INJECTION, POWDER, LYOPHILIZED, FOR SOLUTION INTRAVENOUS at 03:03

## 2018-01-01 RX ADMIN — ERYTHROPOIETIN 8100 UNITS: 10000 INJECTION, SOLUTION INTRAVENOUS; SUBCUTANEOUS at 02:05

## 2018-01-01 RX ADMIN — PREDNISONE 5 MG: 5 TABLET ORAL at 01:05

## 2018-01-01 RX ADMIN — LIDOCAINE HYDROCHLORIDE 2 ML: 20 JELLY TOPICAL at 12:06

## 2018-01-01 RX ADMIN — HEPARIN SODIUM 5000 UNITS: 5000 INJECTION, SOLUTION INTRAVENOUS; SUBCUTANEOUS at 04:02

## 2018-01-01 RX ADMIN — ACETAMINOPHEN 999 MG: 650 SOLUTION ORAL at 03:04

## 2018-01-01 RX ADMIN — INSULIN ASPART 4 UNITS: 100 INJECTION, SOLUTION INTRAVENOUS; SUBCUTANEOUS at 11:06

## 2018-01-01 RX ADMIN — TACROLIMUS 3 MG: 1 CAPSULE ORAL at 08:05

## 2018-01-01 RX ADMIN — Medication 0.08 MCG/KG/MIN: at 06:06

## 2018-01-01 RX ADMIN — MIDAZOLAM HYDROCHLORIDE 2 MG: 1 INJECTION, SOLUTION INTRAMUSCULAR; INTRAVENOUS at 12:06

## 2018-01-01 RX ADMIN — CHLORHEXIDINE GLUCONATE 15 ML: 1.2 RINSE ORAL at 09:06

## 2018-01-01 RX ADMIN — ALPRAZOLAM 0.5 MG: 0.5 TABLET, ORALLY DISINTEGRATING ORAL at 10:04

## 2018-01-01 RX ADMIN — CETIRIZINE HYDROCHLORIDE 5 MG: 5 TABLET, FILM COATED ORAL at 12:05

## 2018-01-01 RX ADMIN — HEPARIN SODIUM 5000 UNITS: 5000 INJECTION, SOLUTION INTRAVENOUS; SUBCUTANEOUS at 02:03

## 2018-01-01 RX ADMIN — ALPRAZOLAM 0.5 MG: 0.5 TABLET ORAL at 05:05

## 2018-01-01 RX ADMIN — RAMELTEON 8 MG: 8 TABLET, FILM COATED ORAL at 10:04

## 2018-01-01 RX ADMIN — MYCOPHENOLATE MOFETIL 250 MG: 250 CAPSULE ORAL at 11:05

## 2018-01-01 RX ADMIN — HEPARIN SODIUM 5000 UNITS: 5000 INJECTION, SOLUTION INTRAVENOUS; SUBCUTANEOUS at 10:03

## 2018-01-01 RX ADMIN — SODIUM CHLORIDE 2 UNITS/HR: 9 INJECTION, SOLUTION INTRAVENOUS at 05:03

## 2018-01-01 RX ADMIN — PRAVASTATIN SODIUM 40 MG: 40 TABLET ORAL at 09:06

## 2018-01-01 RX ADMIN — GUAIFENESIN AND DEXTROMETHORPHAN 5 ML: 100; 10 SYRUP ORAL at 09:04

## 2018-01-01 RX ADMIN — Medication 0.1 MCG/KG/MIN: at 03:06

## 2018-01-01 RX ADMIN — INSULIN HUMAN 8.1 UNITS: 100 INJECTION, SOLUTION PARENTERAL at 07:03

## 2018-01-01 RX ADMIN — GABAPENTIN 100 MG: 100 CAPSULE ORAL at 03:05

## 2018-01-01 RX ADMIN — ERYTHROPOIETIN 8100 UNITS: 10000 INJECTION, SOLUTION INTRAVENOUS; SUBCUTANEOUS at 09:05

## 2018-01-01 RX ADMIN — INSULIN ASPART 3 UNITS: 100 INJECTION, SOLUTION INTRAVENOUS; SUBCUTANEOUS at 11:04

## 2018-01-01 RX ADMIN — SODIUM CHLORIDE 0.5 UNITS/HR: 9 INJECTION, SOLUTION INTRAVENOUS at 01:04

## 2018-01-01 RX ADMIN — TACROLIMUS 1 MG: 1 CAPSULE ORAL at 09:04

## 2018-01-01 RX ADMIN — ASPIRIN 81 MG: 81 TABLET, COATED ORAL at 05:06

## 2018-01-01 RX ADMIN — HYDROCORTISONE SODIUM SUCCINATE 25 MG: 100 INJECTION, POWDER, FOR SOLUTION INTRAMUSCULAR; INTRAVENOUS at 06:03

## 2018-01-01 RX ADMIN — Medication 3 ML: at 05:04

## 2018-01-01 RX ADMIN — CETIRIZINE HYDROCHLORIDE 5 MG: 5 TABLET, FILM COATED ORAL at 01:04

## 2018-01-01 RX ADMIN — SODIUM CHLORIDE: 0.9 INJECTION, SOLUTION INTRAVENOUS at 10:06

## 2018-01-01 RX ADMIN — METHYLPREDNISOLONE SODIUM SUCCINATE 80 MG: 40 INJECTION, POWDER, LYOPHILIZED, FOR SOLUTION INTRAMUSCULAR; INTRAVENOUS at 09:05

## 2018-01-01 RX ADMIN — MIDODRINE HYDROCHLORIDE 10 MG: 5 TABLET ORAL at 06:04

## 2018-01-01 RX ADMIN — DEXMEDETOMIDINE HYDROCHLORIDE 0.5 MCG/KG/HR: 100 INJECTION, SOLUTION, CONCENTRATE INTRAVENOUS at 11:03

## 2018-01-01 RX ADMIN — DOCUSATE SODIUM 100 MG: 100 CAPSULE, LIQUID FILLED ORAL at 04:05

## 2018-01-01 RX ADMIN — TACROLIMUS 4 MG: 1 CAPSULE ORAL at 08:05

## 2018-01-01 RX ADMIN — SODIUM CHLORIDE 500 ML: 0.9 INJECTION, SOLUTION INTRAVENOUS at 09:06

## 2018-01-01 RX ADMIN — SODIUM CHLORIDE 1.5 UNITS/HR: 9 INJECTION, SOLUTION INTRAVENOUS at 01:04

## 2018-01-01 RX ADMIN — ONDANSETRON 4 MG: 4 TABLET, ORALLY DISINTEGRATING ORAL at 08:05

## 2018-01-01 RX ADMIN — TACROLIMUS 0.5 MG: 0.5 CAPSULE ORAL at 05:04

## 2018-01-01 RX ADMIN — POSACONAZOLE 200 MG: 40 SUSPENSION ORAL at 03:03

## 2018-01-01 RX ADMIN — HUMAN IMMUNOGLOBULIN G 40 G: 20 LIQUID INTRAVENOUS at 10:03

## 2018-01-01 RX ADMIN — SODIUM CHLORIDE 1.9 UNITS/HR: 9 INJECTION, SOLUTION INTRAVENOUS at 02:03

## 2018-01-01 RX ADMIN — ERYTHROPOIETIN 4000 UNITS: 4000 INJECTION, SOLUTION INTRAVENOUS; SUBCUTANEOUS at 09:04

## 2018-01-01 RX ADMIN — HYDROXYZINE HYDROCHLORIDE 25 MG: 25 TABLET, FILM COATED ORAL at 09:04

## 2018-01-01 RX ADMIN — PRAVASTATIN SODIUM 40 MG: 40 TABLET ORAL at 09:01

## 2018-01-01 RX ADMIN — ENOXAPARIN SODIUM 30 MG: 100 INJECTION SUBCUTANEOUS at 06:06

## 2018-01-01 RX ADMIN — ALPRAZOLAM 0.5 MG: 0.5 TABLET, ORALLY DISINTEGRATING ORAL at 08:04

## 2018-01-01 RX ADMIN — DEXMEDETOMIDINE HYDROCHLORIDE 0.6 MCG/KG/HR: 100 INJECTION, SOLUTION, CONCENTRATE INTRAVENOUS at 07:03

## 2018-01-01 RX ADMIN — ACETAMINOPHEN 1000 MG: 10 INJECTION, SOLUTION INTRAVENOUS at 07:01

## 2018-01-01 RX ADMIN — VANCOMYCIN HYDROCHLORIDE 1 G: 10 INJECTION, POWDER, LYOPHILIZED, FOR SOLUTION INTRAVENOUS at 06:05

## 2018-01-01 RX ADMIN — ERYTHROPOIETIN 10000 UNITS: 10000 INJECTION, SOLUTION INTRAVENOUS; SUBCUTANEOUS at 12:06

## 2018-01-01 RX ADMIN — FENTANYL CITRATE 100 MCG: 50 INJECTION, SOLUTION INTRAMUSCULAR; INTRAVENOUS at 07:01

## 2018-01-01 RX ADMIN — DRONABINOL 2.5 MG: 2.5 CAPSULE ORAL at 09:05

## 2018-01-01 RX ADMIN — SODIUM CHLORIDE 300 ML: 9 INJECTION, SOLUTION INTRAVENOUS at 11:04

## 2018-01-01 RX ADMIN — LEVALBUTEROL HYDROCHLORIDE 0.63 MG: 0.63 SOLUTION RESPIRATORY (INHALATION) at 09:05

## 2018-01-01 RX ADMIN — Medication 0.26 MCG/KG/MIN: at 04:06

## 2018-01-01 RX ADMIN — Medication 3 ML: at 07:03

## 2018-01-01 RX ADMIN — ALBUMIN HUMAN 25 G: 0.25 SOLUTION INTRAVENOUS at 09:04

## 2018-01-01 RX ADMIN — Medication 4 MG: at 11:06

## 2018-01-01 RX ADMIN — ONDANSETRON 8 MG: 8 TABLET, ORALLY DISINTEGRATING ORAL at 07:05

## 2018-01-01 RX ADMIN — TAMSULOSIN HYDROCHLORIDE 0.4 MG: 0.4 CAPSULE ORAL at 08:03

## 2018-01-01 RX ADMIN — DIBASIC SODIUM PHOSPHATE, MONOBASIC POTASSIUM PHOSPHATE AND MONOBASIC SODIUM PHOSPHATE 2 TABLET: 852; 155; 130 TABLET ORAL at 12:03

## 2018-01-01 RX ADMIN — ESCITALOPRAM OXALATE 20 MG: 10 TABLET ORAL at 07:04

## 2018-01-01 RX ADMIN — INSULIN ASPART 1 UNITS: 100 INJECTION, SOLUTION INTRAVENOUS; SUBCUTANEOUS at 05:03

## 2018-01-01 RX ADMIN — Medication 0.06 MCG/KG/MIN: at 05:06

## 2018-01-01 RX ADMIN — POTASSIUM & SODIUM PHOSPHATES POWDER PACK 280-160-250 MG 2 PACKET: 280-160-250 PACK at 02:05

## 2018-01-01 RX ADMIN — GUAIFENESIN 600 MG: 600 TABLET, EXTENDED RELEASE ORAL at 01:04

## 2018-01-01 RX ADMIN — TACROLIMUS 3 MG: 1 CAPSULE ORAL at 01:03

## 2018-01-01 RX ADMIN — TACROLIMUS 3 MG: 1 CAPSULE ORAL at 05:02

## 2018-01-01 RX ADMIN — TACROLIMUS 5 MG: 1 CAPSULE ORAL at 02:05

## 2018-01-01 RX ADMIN — LEVOTHYROXINE SODIUM 125 MCG: 25 TABLET ORAL at 08:03

## 2018-01-01 RX ADMIN — ONDANSETRON 8 MG: 8 TABLET, ORALLY DISINTEGRATING ORAL at 04:04

## 2018-01-01 RX ADMIN — TACROLIMUS 5 MG: 1 CAPSULE ORAL at 09:05

## 2018-01-01 RX ADMIN — OXYCODONE HYDROCHLORIDE 5 MG: 5 TABLET ORAL at 10:05

## 2018-01-01 RX ADMIN — NOREPINEPHRINE BITARTRATE 0.7 MCG/KG/MIN: 1 INJECTION, SOLUTION, CONCENTRATE INTRAVENOUS at 05:03

## 2018-01-01 RX ADMIN — INSULIN DETEMIR 4 UNITS: 100 INJECTION, SOLUTION SUBCUTANEOUS at 12:05

## 2018-01-01 RX ADMIN — LIDOCAINE HYDROCHLORIDE 1 EACH: 20 JELLY TOPICAL at 07:01

## 2018-01-01 RX ADMIN — LIDOCAINE HYDROCHLORIDE 20 ML: 20 INJECTION, SOLUTION INFILTRATION; PERINEURAL at 12:06

## 2018-01-01 RX ADMIN — OXYCODONE HYDROCHLORIDE 5 MG: 5 TABLET ORAL at 02:06

## 2018-01-01 RX ADMIN — MAGNESIUM OXIDE TAB 400 MG (241.3 MG ELEMENTAL MG) 800 MG: 400 (241.3 MG) TAB at 05:03

## 2018-01-01 RX ADMIN — NORTRIPTYLINE HYDROCHLORIDE 25 MG: 25 CAPSULE ORAL at 08:03

## 2018-01-01 RX ADMIN — DEXMEDETOMIDINE HYDROCHLORIDE 0.6 MCG/KG/HR: 100 INJECTION, SOLUTION, CONCENTRATE INTRAVENOUS at 02:03

## 2018-01-01 RX ADMIN — CEFEPIME 2 G: 2 INJECTION, POWDER, FOR SOLUTION INTRAVENOUS at 11:02

## 2018-01-01 RX ADMIN — DEXMEDETOMIDINE HYDROCHLORIDE 1.2 MCG/KG/HR: 100 INJECTION, SOLUTION, CONCENTRATE INTRAVENOUS at 02:03

## 2018-01-01 RX ADMIN — ESCITALOPRAM 20 MG: 5 TABLET, FILM COATED ORAL at 02:06

## 2018-01-01 RX ADMIN — LACTULOSE 20 G: 20 SOLUTION ORAL at 06:05

## 2018-01-01 RX ADMIN — FAMOTIDINE 20 MG: 20 TABLET, FILM COATED ORAL at 07:05

## 2018-01-01 RX ADMIN — HEPARIN SODIUM 1000 UNITS: 1000 INJECTION, SOLUTION INTRAVENOUS; SUBCUTANEOUS at 03:06

## 2018-01-01 RX ADMIN — PROCHLORPERAZINE EDISYLATE 10 MG: 5 INJECTION INTRAMUSCULAR; INTRAVENOUS at 12:05

## 2018-01-01 RX ADMIN — HEPARIN SODIUM 5000 UNITS: 5000 INJECTION, SOLUTION INTRAVENOUS; SUBCUTANEOUS at 12:05

## 2018-01-01 RX ADMIN — ACETAMINOPHEN 650 MG: 325 TABLET ORAL at 08:04

## 2018-01-01 RX ADMIN — LEVOTHYROXINE SODIUM 150 MCG: 150 TABLET ORAL at 06:01

## 2018-01-01 RX ADMIN — LEVOTHYROXINE SODIUM ANHYDROUS 88 MCG: 100 INJECTION, POWDER, LYOPHILIZED, FOR SOLUTION INTRAVENOUS at 10:03

## 2018-01-01 RX ADMIN — GABAPENTIN 900 MG: 300 CAPSULE ORAL at 10:02

## 2018-01-01 RX ADMIN — TORSEMIDE 40 MG: 20 TABLET ORAL at 08:03

## 2018-01-01 RX ADMIN — PREDNISONE 5 MG: 5 TABLET ORAL at 11:04

## 2018-01-01 RX ADMIN — INSULIN ASPART 1 UNITS: 100 INJECTION, SOLUTION INTRAVENOUS; SUBCUTANEOUS at 12:03

## 2018-01-01 RX ADMIN — GUAIFENESIN 600 MG: 600 TABLET, EXTENDED RELEASE ORAL at 10:05

## 2018-01-01 RX ADMIN — ONDANSETRON 4 MG: 4 TABLET, ORALLY DISINTEGRATING ORAL at 10:04

## 2018-01-01 RX ADMIN — PROMETHAZINE HYDROCHLORIDE 12.5 MG: 25 INJECTION INTRAMUSCULAR; INTRAVENOUS at 03:04

## 2018-01-01 RX ADMIN — SODIUM CHLORIDE 2.2 UNITS/HR: 9 INJECTION, SOLUTION INTRAVENOUS at 04:04

## 2018-01-01 RX ADMIN — SODIUM CHLORIDE 0.1 UNITS/HR: 9 INJECTION, SOLUTION INTRAVENOUS at 06:04

## 2018-01-01 RX ADMIN — SODIUM CHLORIDE: 0.9 INJECTION, SOLUTION INTRAVENOUS at 11:04

## 2018-01-01 RX ADMIN — EPOETIN ALFA 20000 UNITS: 20000 SOLUTION INTRAVENOUS; SUBCUTANEOUS at 10:02

## 2018-01-01 RX ADMIN — ALPRAZOLAM 0.5 MG: 0.5 TABLET ORAL at 03:05

## 2018-01-01 RX ADMIN — PROCHLORPERAZINE EDISYLATE 10 MG: 5 INJECTION INTRAMUSCULAR; INTRAVENOUS at 09:05

## 2018-01-01 RX ADMIN — INSULIN ASPART 4 UNITS: 100 INJECTION, SOLUTION INTRAVENOUS; SUBCUTANEOUS at 04:02

## 2018-01-01 RX ADMIN — CETIRIZINE HYDROCHLORIDE 5 MG: 5 TABLET, FILM COATED ORAL at 11:05

## 2018-01-01 RX ADMIN — FENTANYL CITRATE 50 MCG: 50 INJECTION, SOLUTION INTRAMUSCULAR; INTRAVENOUS at 10:04

## 2018-01-01 RX ADMIN — FENTANYL CITRATE 50 MCG: 50 INJECTION, SOLUTION INTRAMUSCULAR; INTRAVENOUS at 08:04

## 2018-01-01 RX ADMIN — SODIUM CHLORIDE, PRESERVATIVE FREE 3 ML: 5 INJECTION INTRAVENOUS at 06:01

## 2018-01-01 RX ADMIN — PROMETHAZINE HYDROCHLORIDE 12.5 MG: 25 INJECTION INTRAMUSCULAR; INTRAVENOUS at 09:06

## 2018-01-01 RX ADMIN — SODIUM CHLORIDE: 0.9 INJECTION, SOLUTION INTRAVENOUS at 08:04

## 2018-01-01 RX ADMIN — PROPOFOL 40 MCG/KG/MIN: 10 INJECTION, EMULSION INTRAVENOUS at 06:03

## 2018-01-01 RX ADMIN — SODIUM PHOSPHATE, MONOBASIC, MONOHYDRATE AND SODIUM PHOSPHATE, DIBASIC, ANHYDROUS 30 MMOL: 276; 142 INJECTION, SOLUTION INTRAVENOUS at 04:06

## 2018-01-01 RX ADMIN — ASPIRIN 81 MG: 81 TABLET, COATED ORAL at 02:06

## 2018-01-01 RX ADMIN — OXYCODONE HYDROCHLORIDE 5 MG: 5 TABLET ORAL at 09:05

## 2018-01-01 RX ADMIN — SODIUM CHLORIDE 1.3 UNITS/HR: 9 INJECTION, SOLUTION INTRAVENOUS at 11:04

## 2018-01-01 RX ADMIN — TAMSULOSIN HYDROCHLORIDE 0.4 MG: 0.4 CAPSULE ORAL at 09:01

## 2018-01-01 RX ADMIN — INSULIN ASPART 1 UNITS: 100 INJECTION, SOLUTION INTRAVENOUS; SUBCUTANEOUS at 12:05

## 2018-01-01 RX ADMIN — LORAZEPAM 1 MG: 2 INJECTION INTRAMUSCULAR; INTRAVENOUS at 06:06

## 2018-01-01 RX ADMIN — METHYLPREDNISOLONE SODIUM SUCCINATE 80 MG: 40 INJECTION, POWDER, LYOPHILIZED, FOR SOLUTION INTRAMUSCULAR; INTRAVENOUS at 02:05

## 2018-01-01 RX ADMIN — PRAVASTATIN SODIUM 40 MG: 40 TABLET ORAL at 06:06

## 2018-01-01 RX ADMIN — ONDANSETRON 8 MG: 8 TABLET, ORALLY DISINTEGRATING ORAL at 12:05

## 2018-01-01 RX ADMIN — Medication 4 MG: at 08:06

## 2018-01-01 RX ADMIN — LEVOTHYROXINE SODIUM 150 MCG: 100 TABLET ORAL at 09:04

## 2018-01-01 RX ADMIN — Medication 0.08 MCG/KG/MIN: at 07:06

## 2018-01-01 RX ADMIN — INSULIN ASPART 4 UNITS: 100 INJECTION, SOLUTION INTRAVENOUS; SUBCUTANEOUS at 06:05

## 2018-01-01 RX ADMIN — LEVOTHYROXINE SODIUM ANHYDROUS 75 MCG: 100 INJECTION, POWDER, LYOPHILIZED, FOR SOLUTION INTRAVENOUS at 07:04

## 2018-01-01 RX ADMIN — ACETAMINOPHEN 999.01 MG: 650 SOLUTION ORAL at 11:04

## 2018-01-01 RX ADMIN — MAGNESIUM SULFATE IN WATER 2 G: 40 INJECTION, SOLUTION INTRAVENOUS at 05:04

## 2018-01-01 RX ADMIN — TACROLIMUS 2 MG: 1 CAPSULE ORAL at 08:04

## 2018-01-01 RX ADMIN — ERYTHROPOIETIN 10000 UNITS: 10000 INJECTION, SOLUTION INTRAVENOUS; SUBCUTANEOUS at 09:06

## 2018-01-01 RX ADMIN — CHLORHEXIDINE GLUCONATE 15 ML: 1.2 RINSE ORAL at 10:04

## 2018-01-01 RX ADMIN — ONDANSETRON 4 MG: 2 INJECTION INTRAMUSCULAR; INTRAVENOUS at 11:04

## 2018-01-01 RX ADMIN — DOCUSATE SODIUM 100 MG: 100 CAPSULE, LIQUID FILLED ORAL at 12:05

## 2018-01-01 RX ADMIN — Medication 2 MG: at 08:04

## 2018-01-01 RX ADMIN — ONDANSETRON 4 MG: 4 TABLET, ORALLY DISINTEGRATING ORAL at 06:04

## 2018-01-01 RX ADMIN — SODIUM CHLORIDE 2517 ML: 0.9 INJECTION, SOLUTION INTRAVENOUS at 11:02

## 2018-01-01 RX ADMIN — ONDANSETRON 8 MG: 8 TABLET, ORALLY DISINTEGRATING ORAL at 06:05

## 2018-01-01 RX ADMIN — MOXIFLOXACIN HYDROCHLORIDE 400 MG: 400 TABLET, FILM COATED ORAL at 11:02

## 2018-01-01 RX ADMIN — POLYETHYLENE GLYCOL 3350 17 G: 17 POWDER, FOR SOLUTION ORAL at 08:06

## 2018-01-01 RX ADMIN — METRONIDAZOLE 500 MG: 500 INJECTION, SOLUTION INTRAVENOUS at 05:05

## 2018-01-01 RX ADMIN — TACROLIMUS 5 MG: 5 CAPSULE ORAL at 06:06

## 2018-01-01 RX ADMIN — MYCOPHENOLATE MOFETIL 250 MG: 250 CAPSULE ORAL at 06:06

## 2018-01-01 RX ADMIN — FAMOTIDINE 20 MG: 10 INJECTION INTRAVENOUS at 10:03

## 2018-01-01 RX ADMIN — HYDROCORTISONE SODIUM SUCCINATE 100 MG: 100 INJECTION, POWDER, FOR SOLUTION INTRAMUSCULAR; INTRAVENOUS at 06:03

## 2018-01-01 RX ADMIN — INSULIN ASPART 4 UNITS: 100 INJECTION, SOLUTION INTRAVENOUS; SUBCUTANEOUS at 05:06

## 2018-01-01 RX ADMIN — DEXTROSE 40 MG: 50 INJECTION, SOLUTION INTRAVENOUS at 09:06

## 2018-01-01 RX ADMIN — NITROGLYCERIN 0.5 INCH: 20 OINTMENT TOPICAL at 05:04

## 2018-01-01 RX ADMIN — ALPRAZOLAM 0.5 MG: 0.5 TABLET, ORALLY DISINTEGRATING ORAL at 12:04

## 2018-01-01 RX ADMIN — INSULIN ASPART 2 UNITS: 100 INJECTION, SOLUTION INTRAVENOUS; SUBCUTANEOUS at 12:03

## 2018-01-01 RX ADMIN — INSULIN ASPART 3 UNITS: 100 INJECTION, SOLUTION INTRAVENOUS; SUBCUTANEOUS at 02:04

## 2018-01-01 RX ADMIN — LINEZOLID 600 MG: 600 INJECTION, SOLUTION INTRAVENOUS at 05:04

## 2018-01-01 RX ADMIN — Medication 0.02 MCG/KG/MIN: at 08:03

## 2018-01-01 RX ADMIN — CEFAZOLIN 2 G: 330 INJECTION, POWDER, FOR SOLUTION INTRAMUSCULAR; INTRAVENOUS at 03:03

## 2018-01-01 RX ADMIN — ONDANSETRON 8 MG: 8 TABLET, ORALLY DISINTEGRATING ORAL at 01:05

## 2018-01-01 RX ADMIN — NOREPINEPHRINE BITARTRATE 0.06 MCG/KG/MIN: 1 INJECTION, SOLUTION, CONCENTRATE INTRAVENOUS at 12:03

## 2018-01-01 RX ADMIN — SODIUM CHLORIDE 0.5 UNITS/HR: 9 INJECTION, SOLUTION INTRAVENOUS at 05:04

## 2018-01-01 RX ADMIN — DEXTROSE 40 MG: 50 INJECTION, SOLUTION INTRAVENOUS at 11:06

## 2018-01-01 RX ADMIN — ESCITALOPRAM OXALATE 20 MG: 10 TABLET ORAL at 11:05

## 2018-01-01 RX ADMIN — POSACONAZOLE 200 MG: 40 SUSPENSION ORAL at 12:03

## 2018-01-01 RX ADMIN — Medication 5 MG: at 09:06

## 2018-01-01 RX ADMIN — PHENYLEPHRINE HYDROCHLORIDE 100 MCG: 10 INJECTION INTRAVENOUS at 08:01

## 2018-01-01 RX ADMIN — INSULIN ASPART 4 UNITS: 100 INJECTION, SOLUTION INTRAVENOUS; SUBCUTANEOUS at 08:02

## 2018-01-01 RX ADMIN — SODIUM CHLORIDE, SODIUM GLUCONATE, SODIUM ACETATE, POTASSIUM CHLORIDE, MAGNESIUM CHLORIDE, SODIUM PHOSPHATE, DIBASIC, AND POTASSIUM PHOSPHATE: .53; .5; .37; .037; .03; .012; .00082 INJECTION, SOLUTION INTRAVENOUS at 03:03

## 2018-01-01 RX ADMIN — GANCICLOVIR SODIUM 200 MG: 500 INJECTION, POWDER, LYOPHILIZED, FOR SOLUTION INTRAVENOUS at 06:03

## 2018-01-01 RX ADMIN — HYDROCORTISONE SODIUM SUCCINATE 25 MG: 100 INJECTION, POWDER, FOR SOLUTION INTRAMUSCULAR; INTRAVENOUS at 01:03

## 2018-01-01 RX ADMIN — NEOSTIGMINE METHYLSULFATE 5 MG: 1 INJECTION INTRAVENOUS at 11:04

## 2018-01-01 RX ADMIN — LEVOTHYROXINE SODIUM 137 MCG: 25 TABLET ORAL at 04:05

## 2018-01-01 RX ADMIN — LEVOTHYROXINE SODIUM 125 MCG: 25 TABLET ORAL at 05:02

## 2018-01-01 RX ADMIN — GABAPENTIN 900 MG: 300 CAPSULE ORAL at 10:01

## 2018-01-01 RX ADMIN — INSULIN ASPART 3 UNITS: 100 INJECTION, SOLUTION INTRAVENOUS; SUBCUTANEOUS at 05:02

## 2018-01-01 RX ADMIN — AZITHROMYCIN 500 MG: 250 TABLET, FILM COATED ORAL at 05:03

## 2018-01-01 RX ADMIN — CEFEPIME 1 G: 1 INJECTION, POWDER, FOR SOLUTION INTRAMUSCULAR; INTRAVENOUS at 07:05

## 2018-01-01 RX ADMIN — PREDNISONE 2.5 MG: 2.5 TABLET ORAL at 11:05

## 2018-01-01 RX ADMIN — FENTANYL CITRATE 50 MCG: 50 INJECTION, SOLUTION INTRAMUSCULAR; INTRAVENOUS at 05:04

## 2018-01-01 RX ADMIN — MIDODRINE HYDROCHLORIDE 10 MG: 5 TABLET ORAL at 04:06

## 2018-01-01 RX ADMIN — MOXIFLOXACIN HYDROCHLORIDE 400 MG: 400 INJECTION, SOLUTION INTRAVENOUS at 02:05

## 2018-01-01 RX ADMIN — ACETAMINOPHEN 650 MG: 325 TABLET ORAL at 04:05

## 2018-01-01 RX ADMIN — TACROLIMUS 4 MG: 1 CAPSULE ORAL at 09:05

## 2018-01-01 RX ADMIN — Medication 4 MG: at 09:04

## 2018-01-01 RX ADMIN — HEPARIN SODIUM 5000 UNITS: 5000 INJECTION, SOLUTION INTRAVENOUS; SUBCUTANEOUS at 04:05

## 2018-01-01 RX ADMIN — DEXTROSE 300 MG: 5 SOLUTION INTRAVENOUS at 08:03

## 2018-01-01 RX ADMIN — ESCITALOPRAM 20 MG: 5 TABLET, FILM COATED ORAL at 06:02

## 2018-01-01 RX ADMIN — HEPARIN SODIUM 5000 UNITS: 5000 INJECTION, SOLUTION INTRAVENOUS; SUBCUTANEOUS at 07:04

## 2018-01-01 RX ADMIN — SODIUM CHLORIDE 1.2 UNITS/HR: 9 INJECTION, SOLUTION INTRAVENOUS at 05:04

## 2018-01-01 RX ADMIN — NEOSTIGMINE METHYLSULFATE 4 MG: 1 INJECTION INTRAVENOUS at 08:01

## 2018-01-01 RX ADMIN — PHENYLEPHRINE HYDROCHLORIDE 200 MCG: 10 INJECTION INTRAVENOUS at 08:01

## 2018-01-01 RX ADMIN — IPRATROPIUM BROMIDE AND ALBUTEROL SULFATE 3 ML: .5; 3 SOLUTION RESPIRATORY (INHALATION) at 10:04

## 2018-01-01 RX ADMIN — PROMETHAZINE HYDROCHLORIDE 12.5 MG: 25 INJECTION INTRAMUSCULAR; INTRAVENOUS at 04:04

## 2018-01-01 RX ADMIN — PROMETHAZINE HYDROCHLORIDE 6.25 MG: 25 INJECTION INTRAMUSCULAR; INTRAVENOUS at 08:04

## 2018-01-01 RX ADMIN — POTASSIUM CHLORIDE 20 MEQ: 1500 TABLET, EXTENDED RELEASE ORAL at 11:06

## 2018-01-01 RX ADMIN — ESCITALOPRAM 20 MG: 20 TABLET, FILM COATED ORAL at 01:01

## 2018-01-01 RX ADMIN — ONDANSETRON 8 MG: 8 TABLET, ORALLY DISINTEGRATING ORAL at 11:04

## 2018-01-01 RX ADMIN — FENTANYL CITRATE 50 MCG: 50 INJECTION, SOLUTION INTRAMUSCULAR; INTRAVENOUS at 01:04

## 2018-01-01 RX ADMIN — QUETIAPINE FUMARATE 50 MG: 25 TABLET, FILM COATED ORAL at 07:05

## 2018-01-01 RX ADMIN — MYCOPHENOLATE MOFETIL 250 MG: 250 CAPSULE ORAL at 01:05

## 2018-01-01 RX ADMIN — SODIUM CHLORIDE 350 ML: 0.9 INJECTION, SOLUTION INTRAVENOUS at 09:04

## 2018-01-01 RX ADMIN — INSULIN ASPART 4 UNITS: 100 INJECTION, SOLUTION INTRAVENOUS; SUBCUTANEOUS at 11:04

## 2018-01-01 RX ADMIN — Medication 3 MG: at 09:04

## 2018-01-01 RX ADMIN — FAMOTIDINE 20 MG: 10 INJECTION, SOLUTION INTRAVENOUS at 01:03

## 2018-01-01 RX ADMIN — ESCITALOPRAM OXALATE 10 MG: 10 TABLET ORAL at 11:04

## 2018-01-01 RX ADMIN — NOREPINEPHRINE BITARTRATE 0.33 MCG/KG/MIN: 1 INJECTION, SOLUTION, CONCENTRATE INTRAVENOUS at 02:03

## 2018-01-01 RX ADMIN — HEPARIN SODIUM 5000 UNITS: 5000 INJECTION, SOLUTION INTRAVENOUS; SUBCUTANEOUS at 01:05

## 2018-01-01 RX ADMIN — SODIUM BICARBONATE: 84 INJECTION, SOLUTION INTRAVENOUS at 01:03

## 2018-01-01 RX ADMIN — TOPICAL ANESTHETIC: 200 SPRAY DENTAL; PERIODONTAL at 12:06

## 2018-01-01 RX ADMIN — ROCURONIUM BROMIDE 40 MG: 10 INJECTION, SOLUTION INTRAVENOUS at 10:04

## 2018-01-01 RX ADMIN — SODIUM CHLORIDE 1.2 UNITS/HR: 9 INJECTION, SOLUTION INTRAVENOUS at 09:03

## 2018-01-01 RX ADMIN — POLYETHYLENE GLYCOL 3350 17 G: 17 POWDER, FOR SOLUTION ORAL at 11:06

## 2018-01-01 RX ADMIN — DEXMEDETOMIDINE HYDROCHLORIDE 0.6 MCG/KG/HR: 100 INJECTION, SOLUTION, CONCENTRATE INTRAVENOUS at 10:03

## 2018-01-01 RX ADMIN — SODIUM CHLORIDE 0.6 UNITS/HR: 9 INJECTION, SOLUTION INTRAVENOUS at 07:05

## 2018-01-01 RX ADMIN — HYDROCORTISONE SODIUM SUCCINATE 50 MG: 100 INJECTION, POWDER, FOR SOLUTION INTRAMUSCULAR; INTRAVENOUS at 10:03

## 2018-01-01 RX ADMIN — SODIUM CHLORIDE: 0.9 INJECTION, SOLUTION INTRAVENOUS at 09:04

## 2018-01-01 RX ADMIN — ONDANSETRON 4 MG: 4 TABLET, ORALLY DISINTEGRATING ORAL at 08:04

## 2018-01-01 RX ADMIN — CETIRIZINE HYDROCHLORIDE 5 MG: 5 TABLET, FILM COATED ORAL at 12:04

## 2018-01-01 RX ADMIN — PROCHLORPERAZINE EDISYLATE 10 MG: 5 INJECTION INTRAMUSCULAR; INTRAVENOUS at 06:04

## 2018-01-01 RX ADMIN — FENTANYL CITRATE 50 MCG: 50 INJECTION, SOLUTION INTRAMUSCULAR; INTRAVENOUS at 04:03

## 2018-01-01 RX ADMIN — FENTANYL CITRATE 25 MCG: 50 INJECTION, SOLUTION INTRAMUSCULAR; INTRAVENOUS at 12:04

## 2018-01-01 RX ADMIN — SODIUM CHLORIDE 1.3 UNITS/HR: 9 INJECTION, SOLUTION INTRAVENOUS at 07:05

## 2018-01-01 RX ADMIN — HYDROCORTISONE SODIUM SUCCINATE 50 MG: 100 INJECTION, POWDER, FOR SOLUTION INTRAMUSCULAR; INTRAVENOUS at 03:03

## 2018-01-01 RX ADMIN — ONDANSETRON 4 MG: 4 TABLET, ORALLY DISINTEGRATING ORAL at 09:04

## 2018-01-01 RX ADMIN — MIDODRINE HYDROCHLORIDE 10 MG: 5 TABLET ORAL at 03:06

## 2018-01-01 RX ADMIN — MAGNESIUM OXIDE TAB 400 MG (241.3 MG ELEMENTAL MG) 400 MG: 400 (241.3 MG) TAB at 01:03

## 2018-01-01 RX ADMIN — POTASSIUM CHLORIDE 40 MEQ: 1500 TABLET, EXTENDED RELEASE ORAL at 06:05

## 2018-01-05 NOTE — PROGRESS NOTES
History & Physical    SUBJECTIVE:     History of Present Illness:  42 y.o. male presents with PMH of OHTX in November 2014 (on Prograf, Myfortic, and Prednisone), Hashimoto's thyroiditis, DM 2 and CKD presents with right pleural effusion and bilateral GGOs. [facility] for pneumonia. During that time underwent chest CT which showed large right pleural effusion. Patient undergoes monthly paracenteses but has never required pleural drainage. During hospital admission, pulmonology performed a right thoracentesis with 1.5 L of drainage. Patient states his SOB improved after drainage. Pathology with no evidence of malignancy. He has since recovered from pneumonia but repeat CXR showed recurrent right pleural effusion.     Never smoker. Denies EtOH use. Disabled since heart transplant.     Chief Complaint   Patient presents with    Consult       Review of patient's allergies indicates:   Allergen Reactions    No known drug allergies        Current Outpatient Prescriptions   Medication Sig Dispense Refill    albuterol 90 mcg/actuation inhaler Inhale 2 puffs into the lungs every 6 (six) hours as needed for Shortness of Breath. 18 g 0    aspirin (ECOTRIN) 81 MG EC tablet Take 1 tablet (81 mg total) by mouth once daily. 30 tablet 11    escitalopram oxalate (LEXAPRO) 20 MG tablet Take 1 tablet (20 mg total) by mouth once daily. 30 tablet 11    fluticasone (FLONASE) 50 mcg/actuation nasal spray 2 sprays by Each Nare route once daily. 1 Bottle 12    gabapentin (NEURONTIN) 300 MG capsule Take 3 capsules (900 mg total) by mouth 3 (three) times daily. 270 capsule 11    glucagon (human recombinant) inj 1mg/mL kit Inject 1 mL (1 mg total) into the muscle as needed. 1 kit 6    insulin NPH (NOVOLIN N) 100 unit/mL injection Inject 20 Units into the skin 2 (two) times daily before meals. 1 vial 0    lancets Misc 1 Device by Misc.(Non-Drug; Combo Route) route 4 (four) times daily with meals and  "nightly. 100 each 5    levothyroxine (SYNTHROID) 150 MCG tablet Take 1 tablet (150 mcg total) by mouth every morning. 30 tablet 12    magnesium oxide (MAG-OX) 400 mg tablet Take 1 tablet (400 mg total) by mouth once daily. 30 tablet 11    mycophenolate (MYFORTIC) 180 MG TbEC Take 4 tablets (720 mg total) by mouth 2 (two) times daily. S/P Heart Transplant 11/18/2014 ICD-10 Z94.1 240 tablet 11    nortriptyline (PAMELOR) 25 MG capsule Take 1 capsule (25 mg total) by mouth every evening. 30 capsule 3    ondansetron (ZOFRAN-ODT) 4 MG TbDL Take 2 tablets (8 mg total) by mouth every 8 (eight) hours as needed (nausea). 15 tablet 0    pantoprazole (PROTONIX) 40 MG tablet TAKE ONE TABLET BY MOUTH EVERY DAY 30 tablet 11    pen needle, diabetic 32 gauge x 5/32" Ndle Uses 5 pen needles a day 200 each 12    pravastatin (PRAVACHOL) 40 MG tablet Take 1 tablet (40 mg total) by mouth every evening. 30 tablet 11    predniSONE (DELTASONE) 2.5 MG tablet Take 1 tablet (2.5 mg total) by mouth once daily. S/P Heart Transplant 11/18/2014 ICD-10 Z94.1 30 tablet 11    tacrolimus (PROGRAF) 1 MG Cap Taked 3mg orally in the morning and 3mg orally in the evening. S/p heart transplant  11/18/2014  ICD-10 Z94.1 180 capsule 11    tamsulosin (FLOMAX) 0.4 mg Cp24 TAKE 2 CAPSULES(0.8 MG) BY MOUTH EVERY EVENING 60 capsule 1    torsemide (DEMADEX) 20 MG Tab Take 2 tablets (40 mg total) by mouth once daily. 60 tablet 11     No current facility-administered medications for this visit.        Past Medical History:   Diagnosis Date    Arthritis     CAD (coronary artery disease)     H/o Coronary artery disease; resolved since heart transplant 2014    Cardiomyopathy     CHF (congestive heart failure)     Previously EF 20% (prior to heart transplant); last EF 55-60%; throught to be 2/2 genetics & DM1    Depression     Controlled "for the most part" on Lexipro    Encounter for blood transfusion     during transplant    GERD (gastroesophageal " reflux disease)     Hashimoto's disease     HLD (hyperlipidemia) 6/11/2015    Hypoglycemia unawareness in type 1 diabetes mellitus     Hypothyroid     Initially hyperthyroid 2/2 Hoshimoto's thyroiditis; now on levothyroxine 150 mcg qd    Myocardial infarction     h/o MI x3 prior to heart transplant    Syncope 6/30/2015    Type I (juvenile type) diabetes mellitus with unspecified complication, uncontrolled     Controlled; Dx'd @5y/o; on N insulin 20U BID & Humalog 10U w/meals +SSI; Glu 89 11/9/17; A1c 7.2% 4/5/17    Unspecified essential hypertension 05/29/2014    Well controlled; Last /57 on 11/9/17     Past Surgical History:   Procedure Laterality Date    CARDIAC CATHETERIZATION      CARDIAC SURGERY      CHOLECYSTECTOMY      CORONARY ANGIOPLASTY      FOOT SPLIT TRANSFER OF THE POSTERIOR TIBIALIS TENDON PROCEDURE      HEART TRANSPLANT  2014    KNEE SURGERY      s/p oht  November 2014    stent placemnet       Family History   Problem Relation Age of Onset    Heart disease Mother     Kidney disease Mother     Diabetes Mother     Hypertension Mother     Kidney disease Father     Diabetes Father     Hypertension Father     Stroke Father     Heart disease Brother     Stroke Brother     Diabetes Brother     Cancer Brother 50     pancreatic    Vision loss Brother     Hypertension Brother     Diabetes Son     Diabetes Sister     Diabetes Maternal Uncle     Diabetes Paternal Aunt     Diabetes Maternal Grandmother     Diabetes Maternal Grandfather      Social History   Substance Use Topics    Smoking status: Never Smoker    Smokeless tobacco: Never Used    Alcohol use No        Review of Systems:  Review of Systems   Constitutional: Negative for activity change, appetite change, fatigue and fever.   HENT: Negative for congestion, trouble swallowing and voice change.    Eyes: Negative.    Respiratory: Positive for chest tightness and shortness of breath. Negative for cough and  "wheezing.    Cardiovascular: Negative for chest pain and palpitations.   Gastrointestinal: Negative.    Endocrine: Negative.    Genitourinary: Negative.    Musculoskeletal: Negative.    Skin: Negative.    Neurological: Negative.    Hematological: Negative.    Psychiatric/Behavioral: Negative.        OBJECTIVE:     Vital Signs (Most Recent)  Pulse: 107 (01/05/18 1053)  BP: 109/69 (01/05/18 1053)  SpO2: (!) 94 % (01/05/18 1053)  5' 7" (1.702 m)  88.3 kg (194 lb 10.7 oz)     Physical Exam:  Physical Exam   Constitutional: He is oriented to person, place, and time. He appears well-developed and well-nourished.   HENT:   Head: Normocephalic and atraumatic.   Mouth/Throat: Oropharynx is clear and moist.   Eyes: Pupils are equal, round, and reactive to light.   Neck: Normal range of motion. Neck supple.   Cardiovascular: Intact distal pulses.  A regularly irregular rhythm present. Tachycardia present.    No murmur heard.  Pulmonary/Chest: Effort normal and breath sounds normal. He has no wheezes. He exhibits no tenderness.   Abdominal: Soft. Bowel sounds are normal. He exhibits no distension. There is no tenderness.   Musculoskeletal: Normal range of motion.   Lymphadenopathy:     He has no cervical adenopathy.   Neurological: He is alert and oriented to person, place, and time.   Skin: Skin is warm.   Vitals reviewed.    Diagnostic Results:    12/12/17 Chest CT-   1.  Moderate volume right pleural fluid that is worsening of a chronic finding as seen on multiple prior radiographs.  There is associated compressive atelectasis.  2.  Bilateral diffuse patchy consolidation and groundglass densities that are new since radiograph 11/30/2017 and appear worse than chest radiograph 12/10/2017.  Differential includes multifocal infectious pneumonia, pulmonary edema, hemorrhage, aspiration pneumonia, and organizing pneumonia.  3. Status post heart transplant.  4. Moderate ascites; cholecystectomy clips.    12/12/17 2D " ECHO  CONCLUSIONS     1 - Normal left ventricular systolic function (EF 60-65%).     2 - Normal left ventricular diastolic function.     3 - Normal right ventricular systolic function .     4 - Pulmonary hypertension. The estimated PA systolic pressure is 41 mmHg.     5 - Trivial to mild mitral regurgitation.     6 - Trivial tricuspid regurgitation.     7 - Intermediate central venous pressure.     8 - No wall motion abnormalities.     ASSESSMENT/PLAN:     42 y.o. male presents with PMH of OHTX in November 2014 (on Prograf, Myfortic, and Prednisone), Hashimoto's thyroiditis, DM 2 and CKD presents with right pleural effusion and bilateral GGOs.     PLAN:Plan   Due to patient's heart failure and immunosuppression, total decortication and/or pleurodesis could cause a persistent air leak and empyema. Right Pleurx placement may be the best option to achieve pleurodesis with the fewest complications. Discussed with Dr. Ferreira today. Will proceed with right VATS drainage of effusion, possible biopsy, pleurodesis, possible Pleurx. Appropriate patient education regarding the kamari-operative period as well as intraperative details were discussed. Risks, including but not limited to, bleeding, infection, pain and anesthetic complication were discussed. Patient was given the opportunity to ask questions and to have those questions answered to their satisfaction. Patient verbalized understanding to both procedure and associated risks. Consent was obtained.

## 2018-01-05 NOTE — LETTER
[facility] [facility] - Thoracic Surgery  [address]  [place] [address]  Phone: [phone]  Fax: [phone] January 5, 2018      Kanika Ferreira MD  [address] [facility] [address]    Patient: Lv Crocker   MR Number: [id number]   YOB: 1975   Date of Visit: 1/5/2018     Dear Dr. Ferreira:    Thank you for referring Lv Crocker to me for evaluation. He presents with a history of right heart failure complicating heart transplant and hepatic cirrhosis.  He has recurrent right pleural effusions and ascites both of which require drainage.      I think that the best approach is PleuRx catheter placement.  Mr. Crocker's immunosuppressed status will blunt the effectiveness of pleural sclerosants and it will result in a prolonged airleak if decortication is attempted.  I recommend PleuRx catheter placement and removal after 1-2 months.     If you have questions, please do not hesitate to call me. I look forward to following Lv along with you.    Sincerely,      Lv James MD   Section of Thoracic Surgery  [facility] [place] [place]    BLP/hcr    CC  Philippe Mohr MD

## 2018-01-08 NOTE — TELEPHONE ENCOUNTER
----- Message from Osmin Becker sent at 1/8/2018 10:40 AM CST -----  Donita ( [facility] pre op ) is requesting a call from nurse to verify if the pt can be cleared for surgery.        Please call Donita ( [facility] pre op ) back at [phone] Ext

## 2018-01-10 PROBLEM — J18.9 PNEUMONIA OF BOTH LUNGS DUE TO INFECTIOUS ORGANISM: Status: RESOLVED | Noted: 2017-01-01 | Resolved: 2018-01-01

## 2018-01-10 NOTE — PROGRESS NOTES
Patient, Lv Crocker (MRN #[id number]), presented with a recent Estimated Glumerular Filtration Rate (EGFR) between 15 and 29 consistent with the definition of chronic kidney disease stage 4 (ICD10 - N18.4).    eGFR if non    Date Value Ref Range Status   12/20/2017 27.4 (A) >60 mL/min/1.73 m^2 Final     Comment:     Calculation used to obtain the estimated glomerular filtration  rate (eGFR) is the CKD-EPI equation.          The patient's chronic kidney disease stage 4 was monitored, evaluated, addressed and/or treated. This addendum to the medical record is made on 01/10/2018.

## 2018-01-10 NOTE — PROGRESS NOTES
"Subjective:      Patient ID: Lv Crocker is a 42 y.o. male.    Chief Complaint: Pre-op Exam      HPI     Mr. Lv Crocker is a patient of Philippe Mohr MD, who presents for pre-op for VATS.    He was hospitalized for pleural effusion, which was drained (1.5 L), his lung re-expanded, but his pleural effusion recurred. His recurrent ascites and pleural effusions are thought to be related to his heart tpx. Dr. James plans to do the VATS on tomorrow am with the intent of decreasing recurrent pleural effusions.     No cp. Last sob was mild this am while getting dressed, which he attributes his cough and sob to.     Past Medical History:   Diagnosis Date    Arthritis     CAD (coronary artery disease)     H/o Coronary artery disease; resolved since heart transplant 2014    Cardiomyopathy     CHF (congestive heart failure)     Previously EF 20% (prior to heart transplant); last EF 55-60%; throught to be 2/2 genetics & DM1    Depression     Controlled "for the most part" on Lexipro    Encounter for blood transfusion     during transplant    GERD (gastroesophageal reflux disease)     Hashimoto's disease     HLD (hyperlipidemia) 6/11/2015    Hypoglycemia unawareness in type 1 diabetes mellitus     Hypothyroid     Initially hyperthyroid 2/2 Hoshimoto's thyroiditis; now on levothyroxine 150 mcg qd    Myocardial infarction     h/o MI x3 prior to heart transplant    Syncope 6/30/2015    Type I diabetes mellitus, well controlled     Well controlled; Dx'd @5y/o; on N insulin 20U BID & Humalog 10U w/meals +SSI; Glu 89 11/9/17; A1c 7.2% 4/5/17    Unspecified essential hypertension 05/29/2014    Well controlled; Last /57 on 11/9/17     Past Surgical History:   Procedure Laterality Date    CARDIAC CATHETERIZATION      CARDIAC SURGERY      CHOLECYSTECTOMY      CORONARY ANGIOPLASTY      FOOT SPLIT TRANSFER OF THE POSTERIOR TIBIALIS TENDON PROCEDURE      HEART TRANSPLANT  2014    KNEE " SURGERY      s/p oht  November 2014    stent placemnet       Social History     Social History    Marital status:      Spouse name: N/A    Number of children: N/A    Years of education: N/A     Occupational History    Not on file.     Social History Main Topics    Smoking status: Never Smoker    Smokeless tobacco: Never Used    Alcohol use No    Drug use: No    Sexual activity: Yes     Partners: Female     Other Topics Concern    Not on file     Social History Narrative            Breakfast: Skips 80%; cereal; Diet Sprite    Lunch: Turkey or chicken sandwich on white bread; Diet Sprite    Dinner: Grilled burgers, small amount chips; Diet Sprite    Snacks: Ronny crackers before bed on most nights    Eats out: 2x/wk    Water: 0    PA: Walks 15 minutes (strenuous) daily    Goal weight 170-175 lbs by 1/2018     Family History   Problem Relation Age of Onset    Heart disease Mother     Kidney disease Mother     Diabetes Mother     Hypertension Mother     Kidney disease Father     Diabetes Father     Hypertension Father     Stroke Father     Heart disease Brother     Stroke Brother     Diabetes Brother     Cancer Brother 50     pancreatic    Vision loss Brother     Hypertension Brother     Diabetes Son     Diabetes Sister     Diabetes Maternal Uncle     Diabetes Paternal Aunt     Diabetes Maternal Grandmother     Diabetes Maternal Grandfather        Current Outpatient Prescriptions:     albuterol 90 mcg/actuation inhaler, Inhale 2 puffs into the lungs every 6 (six) hours as needed for Shortness of Breath., Disp: 18 g, Rfl: 0    aspirin (ECOTRIN) 81 MG EC tablet, Take 1 tablet (81 mg total) by mouth once daily., Disp: 30 tablet, Rfl: 11    escitalopram oxalate (LEXAPRO) 20 MG tablet, Take 1 tablet (20 mg total) by mouth once daily. (Patient taking differently: Take 20 mg by mouth every morning. ), Disp: 30 tablet, Rfl: 11    fluticasone (FLONASE) 50 mcg/actuation nasal  "spray, 2 sprays by Each Nare route once daily., Disp: 1 Bottle, Rfl: 12    gabapentin (NEURONTIN) 300 MG capsule, Take 3 capsules (900 mg total) by mouth 3 (three) times daily., Disp: 270 capsule, Rfl: 11    glucagon (human recombinant) inj 1mg/mL kit, Inject 1 mL (1 mg total) into the muscle as needed., Disp: 1 kit, Rfl: 6    insulin NPH (NOVOLIN N) 100 unit/mL injection, Inject 20 Units into the skin 2 (two) times daily before meals., Disp: 1 vial, Rfl: 0    lancets Misc, 1 Device by Misc.(Non-Drug; Combo Route) route 4 (four) times daily with meals and nightly., Disp: 100 each, Rfl: 5    levothyroxine (SYNTHROID) 150 MCG tablet, Take 1 tablet (150 mcg total) by mouth every morning., Disp: 30 tablet, Rfl: 12    magnesium oxide (MAG-OX) 400 mg tablet, Take 1 tablet (400 mg total) by mouth once daily., Disp: 30 tablet, Rfl: 11    mycophenolate (MYFORTIC) 180 MG TbEC, Take 4 tablets (720 mg total) by mouth 2 (two) times daily. S/P Heart Transplant 11/18/2014 ICD-10 Z94.1, Disp: 240 tablet, Rfl: 11    nortriptyline (PAMELOR) 25 MG capsule, Take 1 capsule (25 mg total) by mouth every evening., Disp: 30 capsule, Rfl: 3    ondansetron (ZOFRAN-ODT) 4 MG TbDL, Take 2 tablets (8 mg total) by mouth every 8 (eight) hours as needed (nausea)., Disp: 15 tablet, Rfl: 0    pantoprazole (PROTONIX) 40 MG tablet, TAKE ONE TABLET BY MOUTH EVERY DAY, Disp: 30 tablet, Rfl: 11    pen needle, diabetic 32 gauge x 5/32" Ndle, Uses 5 pen needles a day, Disp: 200 each, Rfl: 12    pravastatin (PRAVACHOL) 40 MG tablet, Take 1 tablet (40 mg total) by mouth every evening., Disp: 30 tablet, Rfl: 11    predniSONE (DELTASONE) 2.5 MG tablet, Take 1 tablet (2.5 mg total) by mouth once daily. S/P Heart Transplant 11/18/2014 ICD-10 Z94.1, Disp: 30 tablet, Rfl: 11    tacrolimus (PROGRAF) 1 MG Cap, Taked 3mg orally in the morning and 3mg orally in the evening. S/p heart transplant  11/18/2014  ICD-10 Z94.1, Disp: 180 capsule, Rfl: 11    " "tamsulosin (FLOMAX) 0.4 mg Cp24, TAKE 2 CAPSULES(0.8 MG) BY MOUTH EVERY EVENING, Disp: 60 capsule, Rfl: 1    torsemide (DEMADEX) 20 MG Tab, Take 2 tablets (40 mg total) by mouth once daily., Disp: 60 tablet, Rfl: 11    Review of patient's allergies indicates:   Allergen Reactions    No known drug allergies         Review of Systems   Negative    Objective:     BP (!) 99/55   Pulse 110   Temp 97.3 °F (36.3 °C) (Tympanic)   Ht 5' 7" (1.702 m)   Wt 86 kg (189 lb 9.5 oz)   SpO2 98%   BMI 29.69 kg/m²     Physical Exam  GEN: A&O fully, NAD  PSYC: Normal affect  HEENT: OP: Clear, no LAD, no thyroid masses  CV: RRR, no M/G/R  PULM: Decreased breath sounds in RML and RLL, no wheezes, rales  GI: +mildly distended abdomen  EXT: No C/C/E    Lab Results   Component Value Date    WBC 5.78 12/18/2017    HGB 8.9 (L) 12/18/2017    HCT 27.8 (L) 12/18/2017     12/18/2017    CHOL 120 11/30/2017    TRIG 66 11/30/2017    HDL 32 (L) 11/30/2017    LDLCALC 74.8 11/30/2017    ALT 15 12/18/2017    AST 25 12/18/2017     12/20/2017    K 5.0 12/20/2017    CL 98 12/20/2017    CREATININE 2.7 (H) 12/20/2017    BUN 46 (H) 12/20/2017    CO2 29 12/20/2017    TSH 1.485 11/30/2017    PSA 0.59 12/06/2012    INR 1.1 11/27/2017    HGBA1C 6.9 (H) 11/30/2017       Assessment:      1. Preop examination: Moderate risk patient given immunocompromised state (currently on prednisone 2.5 mg qd), CKD (IV, stable), well controlled DM1, anemia of chronic dz, who is scheduled for VATS tomorrow am, which is a moderate risk surgery. No further testing indicated. Holding ASA, Demadex, escitalopram & gabapentin tomorrow am for surgery.   2. Heart transplanted: Stable.    3. History of restrictive cardiomyopathy: Stable.   4. Pleural effusion: Recurrent, which is the indication of his VATS.   5.      GERD: Stable. He plans to take his PPI the am of surgery.     Plan:   Preop examination    PVD (peripheral vascular disease)    Heart " transplanted    History of restrictive cardiomyopathy    Pleural effusion    GERD without esophagitis      Return in about 3 months (around 4/10/2018), or if symptoms worsen or fail to improve, for FU on overweight.

## 2018-01-11 NOTE — H&P (VIEW-ONLY)
History & Physical    SUBJECTIVE:     History of Present Illness:  42 y.o. male presents with PMH of OHTX in November 2014 (on Prograf, Myfortic, and Prednisone), Hashimoto's thyroiditis, DM 2 and CKD presents with right pleural effusion and bilateral GGOs. Patient was admitted to [facility] [place] about 2 weeks ago for pneumonia. During that time underwent chest CT which showed large right pleural effusion. Patient undergoes monthly paracenteses but has never required pleural drainage. During hospital admission, pulmonology performed a right thoracentesis with 1.5 L of drainage. Patient states his SOB improved after drainage. Pathology with no evidence of malignancy. He has since recovered from pneumonia but repeat CXR showed recurrent right pleural effusion.     Never smoker. Denies EtOH use. Disabled since heart transplant.     Chief Complaint   Patient presents with    Consult       Review of patient's allergies indicates:   Allergen Reactions    No known drug allergies        Current Outpatient Prescriptions   Medication Sig Dispense Refill    albuterol 90 mcg/actuation inhaler Inhale 2 puffs into the lungs every 6 (six) hours as needed for Shortness of Breath. 18 g 0    aspirin (ECOTRIN) 81 MG EC tablet Take 1 tablet (81 mg total) by mouth once daily. 30 tablet 11    escitalopram oxalate (LEXAPRO) 20 MG tablet Take 1 tablet (20 mg total) by mouth once daily. 30 tablet 11    fluticasone (FLONASE) 50 mcg/actuation nasal spray 2 sprays by Each Nare route once daily. 1 Bottle 12    gabapentin (NEURONTIN) 300 MG capsule Take 3 capsules (900 mg total) by mouth 3 (three) times daily. 270 capsule 11    glucagon (human recombinant) inj 1mg/mL kit Inject 1 mL (1 mg total) into the muscle as needed. 1 kit 6    insulin NPH (NOVOLIN N) 100 unit/mL injection Inject 20 Units into the skin 2 (two) times daily before meals. 1 vial 0    lancets Misc 1 Device by Misc.(Non-Drug; Combo Route) route 4 (four) times daily with meals and  "nightly. 100 each 5    levothyroxine (SYNTHROID) 150 MCG tablet Take 1 tablet (150 mcg total) by mouth every morning. 30 tablet 12    magnesium oxide (MAG-OX) 400 mg tablet Take 1 tablet (400 mg total) by mouth once daily. 30 tablet 11    mycophenolate (MYFORTIC) 180 MG TbEC Take 4 tablets (720 mg total) by mouth 2 (two) times daily. S/P Heart Transplant 11/18/2014 ICD-10 Z94.1 240 tablet 11    nortriptyline (PAMELOR) 25 MG capsule Take 1 capsule (25 mg total) by mouth every evening. 30 capsule 3    ondansetron (ZOFRAN-ODT) 4 MG TbDL Take 2 tablets (8 mg total) by mouth every 8 (eight) hours as needed (nausea). 15 tablet 0    pantoprazole (PROTONIX) 40 MG tablet TAKE ONE TABLET BY MOUTH EVERY DAY 30 tablet 11    pen needle, diabetic 32 gauge x 5/32" Ndle Uses 5 pen needles a day 200 each 12    pravastatin (PRAVACHOL) 40 MG tablet Take 1 tablet (40 mg total) by mouth every evening. 30 tablet 11    predniSONE (DELTASONE) 2.5 MG tablet Take 1 tablet (2.5 mg total) by mouth once daily. S/P Heart Transplant 11/18/2014 ICD-10 Z94.1 30 tablet 11    tacrolimus (PROGRAF) 1 MG Cap Taked 3mg orally in the morning and 3mg orally in the evening. S/p heart transplant  11/18/2014  ICD-10 Z94.1 180 capsule 11    tamsulosin (FLOMAX) 0.4 mg Cp24 TAKE 2 CAPSULES(0.8 MG) BY MOUTH EVERY EVENING 60 capsule 1    torsemide (DEMADEX) 20 MG Tab Take 2 tablets (40 mg total) by mouth once daily. 60 tablet 11     No current facility-administered medications for this visit.        Past Medical History:   Diagnosis Date    Arthritis     CAD (coronary artery disease)     H/o Coronary artery disease; resolved since heart transplant 2014    Cardiomyopathy     CHF (congestive heart failure)     Previously EF 20% (prior to heart transplant); last EF 55-60%; throught to be 2/2 genetics & DM1    Depression     Controlled "for the most part" on Lexipro    Encounter for blood transfusion     during transplant    GERD (gastroesophageal " reflux disease)     Hashimoto's disease     HLD (hyperlipidemia) 6/11/2015    Hypoglycemia unawareness in type 1 diabetes mellitus     Hypothyroid     Initially hyperthyroid 2/2 Hoshimoto's thyroiditis; now on levothyroxine 150 mcg qd    Myocardial infarction     h/o MI x3 prior to heart transplant    Syncope 6/30/2015    Type I (juvenile type) diabetes mellitus with unspecified complication, uncontrolled     Controlled; Dx'd @5y/o; on N insulin 20U BID & Humalog 10U w/meals +SSI; Glu 89 11/9/17; A1c 7.2% 4/5/17    Unspecified essential hypertension 05/29/2014    Well controlled; Last /57 on 11/9/17     Past Surgical History:   Procedure Laterality Date    CARDIAC CATHETERIZATION      CARDIAC SURGERY      CHOLECYSTECTOMY      CORONARY ANGIOPLASTY      FOOT SPLIT TRANSFER OF THE POSTERIOR TIBIALIS TENDON PROCEDURE      HEART TRANSPLANT  2014    KNEE SURGERY      s/p oht  November 2014    stent placemnet       Family History   Problem Relation Age of Onset    Heart disease Mother     Kidney disease Mother     Diabetes Mother     Hypertension Mother     Kidney disease Father     Diabetes Father     Hypertension Father     Stroke Father     Heart disease Brother     Stroke Brother     Diabetes Brother     Cancer Brother 50     pancreatic    Vision loss Brother     Hypertension Brother     Diabetes Son     Diabetes Sister     Diabetes Maternal Uncle     Diabetes Paternal Aunt     Diabetes Maternal Grandmother     Diabetes Maternal Grandfather      Social History   Substance Use Topics    Smoking status: Never Smoker    Smokeless tobacco: Never Used    Alcohol use No        Review of Systems:  Review of Systems   Constitutional: Negative for activity change, appetite change, fatigue and fever.   HENT: Negative for congestion, trouble swallowing and voice change.    Eyes: Negative.    Respiratory: Positive for chest tightness and shortness of breath. Negative for cough and  "wheezing.    Cardiovascular: Negative for chest pain and palpitations.   Gastrointestinal: Negative.    Endocrine: Negative.    Genitourinary: Negative.    Musculoskeletal: Negative.    Skin: Negative.    Neurological: Negative.    Hematological: Negative.    Psychiatric/Behavioral: Negative.        OBJECTIVE:     Vital Signs (Most Recent)  Pulse: 107 (01/05/18 1053)  BP: 109/69 (01/05/18 1053)  SpO2: (!) 94 % (01/05/18 1053)  5' 7" (1.702 m)  88.3 kg (194 lb 10.7 oz)     Physical Exam:  Physical Exam   Constitutional: He is oriented to person, place, and time. He appears well-developed and well-nourished.   HENT:   Head: Normocephalic and atraumatic.   Mouth/Throat: Oropharynx is clear and moist.   Eyes: Pupils are equal, round, and reactive to light.   Neck: Normal range of motion. Neck supple.   Cardiovascular: Intact distal pulses.  A regularly irregular rhythm present. Tachycardia present.    No murmur heard.  Pulmonary/Chest: Effort normal and breath sounds normal. He has no wheezes. He exhibits no tenderness.   Abdominal: Soft. Bowel sounds are normal. He exhibits no distension. There is no tenderness.   Musculoskeletal: Normal range of motion.   Lymphadenopathy:     He has no cervical adenopathy.   Neurological: He is alert and oriented to person, place, and time.   Skin: Skin is warm.   Vitals reviewed.    Diagnostic Results:    12/12/17 Chest CT-   1.  Moderate volume right pleural fluid that is worsening of a chronic finding as seen on multiple prior radiographs.  There is associated compressive atelectasis.  2.  Bilateral diffuse patchy consolidation and groundglass densities that are new since radiograph 11/30/2017 and appear worse than chest radiograph 12/10/2017.  Differential includes multifocal infectious pneumonia, pulmonary edema, hemorrhage, aspiration pneumonia, and organizing pneumonia.  3. Status post heart transplant.  4. Moderate ascites; cholecystectomy clips.    12/12/17 2D " ECHO  CONCLUSIONS     1 - Normal left ventricular systolic function (EF 60-65%).     2 - Normal left ventricular diastolic function.     3 - Normal right ventricular systolic function .     4 - Pulmonary hypertension. The estimated PA systolic pressure is 41 mmHg.     5 - Trivial to mild mitral regurgitation.     6 - Trivial tricuspid regurgitation.     7 - Intermediate central venous pressure.     8 - No wall motion abnormalities.     ASSESSMENT/PLAN:     42 y.o. male presents with PMH of OHTX in November 2014 (on Prograf, Myfortic, and Prednisone), Hashimoto's thyroiditis, DM 2 and CKD presents with right pleural effusion and bilateral GGOs.     PLAN:Plan   Due to patient's heart failure and immunosuppression, total decortication and/or pleurodesis could cause a persistent air leak and empyema. Right Pleurx placement may be the best option to achieve pleurodesis with the fewest complications. Discussed with Dr. Ferreira today. Will proceed with right VATS drainage of effusion, possible biopsy, pleurodesis, possible Pleurx. Appropriate patient education regarding the kamari-operative period as well as intraperative details were discussed. Risks, including but not limited to, bleeding, infection, pain and anesthetic complication were discussed. Patient was given the opportunity to ask questions and to have those questions answered to their satisfaction. Patient verbalized understanding to both procedure and associated risks. Consent was obtained.

## 2018-01-11 NOTE — PROGRESS NOTES
Notified DR. Vanegas, anesthesia, pt ate 4 jolly ranchers at 0330 this am due to low BS 65. Per Dr. Vanegas, may proceed w/ getting pt ready for sx. Will notify DR. Mcmullen, anesthesia. Pt in no distress.   0630- Pt difficult stick. Ms. Simons at bedside.

## 2018-01-11 NOTE — BRIEF OP NOTE
[facility]  Brief Operative Note    SUMMARY     Surgery Date: 1/11/2018     Surgeon(s) and Role:     * Lv James MD - Primary     * Casa Wade MD - Fellow    Assisting Surgeon: None    Pre-op Diagnosis:  Pleural effusion [J90]    Post-op Diagnosis:  Post-Op Diagnosis Codes:     * Pleural effusion [J90]    Procedure(s) (LRB):  THORACOSCOPY-VIDEO ASSISTED (VATS) W/PLEURAL BIOPSY (Right)  Insertion-Catheter-Chest (Right)    Anesthesia: General    Description of Procedure: VATS was performed and a large pleural effusion ~1.1L was drained, a pleural biopsy was taken and a pleurx catheter was placed in the right chest    Description of the findings of the procedure: Pleurx/ pleural biopsy/ placement of pleurx catheter    Estimated Blood Loss: 0ml         Specimens:   Specimen (12h ago through future)    Start     Ordered    01/11/18 0818  Specimen to Pathology - Surgery  Once     Comments:  1. Pleural Biopsy Right- Permanent      01/11/18 0837

## 2018-01-11 NOTE — NURSING TRANSFER
Nursing Transfer Note      1/11/2018     Transfer to [address]    Transfer via stretcherr    Transfer with pleurx starter kit    Transported by PCT    Medicines sent: none    Chart send with patient: yes    Notified: patient's family in waiting area per liasabrina Pappas    Patient reassessed at: 1/11/2018

## 2018-01-11 NOTE — TRANSFER OF CARE
"Anesthesia Transfer of Care Note    Patient: Lv Crocker    Procedure(s) Performed: Procedure(s) (LRB):  THORACOSCOPY-VIDEO ASSISTED (VATS) W/PLEURAL BIOPSY (Right)  Insertion-Catheter-Chest (Right)    Patient location: PACU    Anesthesia Type: general    Transport from OR: Transported from OR on 6-10 L/min O2 by face mask with adequate spontaneous ventilation    Post pain: adequate analgesia    Post assessment: no apparent anesthetic complications and tolerated procedure well    Post vital signs: stable    Level of consciousness: awake and responds to stimulation    Nausea/Vomiting: no nausea/vomiting    Complications: none    Transfer of care protocol was followed      Last vitals:   Visit Vitals  BP (!) 102/53   Pulse 104   Temp 36.5 °C (97.7 °F) (Axillary)   Resp 17   Ht 5' 7" (1.702 m)   Wt 85.7 kg (189 lb)   SpO2 96%   BMI 29.60 kg/m²     "

## 2018-01-11 NOTE — TELEPHONE ENCOUNTER
Called to inform pt that I spoke with Dr. Ferreira asking if he could have a paracentesis while in the hospital. Dr. Ferreira ok'd paracentesis, as long as not too much fluid was removed, after 1 liter was removed from his lung in surgery. She didn't want him to have a major fluid shift. I notified JOSE DANIEL Allen for Dr. James that Dr. Ferreira gave an ok for paracentesis.

## 2018-01-11 NOTE — ANESTHESIA PREPROCEDURE EVALUATION
01/11/2018  Lv Crocker is a 42 y.o., male.    Anesthesia Evaluation    I have reviewed the Patient Summary Reports.    I have reviewed the Nursing Notes.   I have reviewed the Medications.     Review of Systems  Anesthesia Hx:  No problems with previous Anesthesia  History of prior surgery of interest to airway management or planning: Previous anesthesia: General  Denies Personal Hx of Anesthesia complications.   Cardiovascular:   Hypertension Past MI CAD   CHF ECG has been reviewed. S/p transplant   Pulmonary:  Pulmonary Normal    Renal/:   Chronic Renal Disease    Hepatic/GI:   GERD    Neurological:   Neuromuscular Disease,    Endocrine:   Diabetes, type 2 Hypothyroidism    Psych:   Psychiatric History        Neurological    Congenital Defects     Developmental Defects       Seizure Disorders     Neurovascular       Neuromuscular    Neuromuscular disease   Trauma       Tumors     Other Neurological Problems         Cardiovascular    Cardiovascular    Congestive heart failure  Coronary artery disease  Hypertension       Gastrointestinal    Gastroesophageal    GERD   Bowel       Abdominal Wall Defects     Liver       Pancreas     Spleen       Nutrition          Endocrine    Endocrine Disorders    Diabetes mellitus  Hypothyroid disease         Psychiatric/Development    Psychiatric Conditions    Psychiatric history   Developmental Conditions                 Physical Exam  General:  Well nourished    Airway/Jaw/Neck:  Airway Findings: Mouth Opening: Normal Tongue: Normal  General Airway Assessment: Adult  Mallampati: II  TM Distance: Normal, at least 6 cm  Jaw/Neck Findings:  Neck ROM: Normal ROM      Dental:  Dental Findings: In tact   Chest/Lungs:  Chest/Lungs Findings: Clear to auscultation     Heart/Vascular:  Heart Findings: Rate: Normal  Rhythm: Regular Rhythm  Sounds: Normal        Mental  "Status:  Mental Status Findings:  Cooperative, Alert and Oriented         Anesthesia Plan  Type of Anesthesia, risks & benefits discussed:  Anesthesia Type:  general  Patient's Preference:   Intra-op Monitoring Plan: standard ASA monitors  Intra-op Monitoring Plan Comments:   Post Op Pain Control Plan: per primary service following discharge from PACU  Post Op Pain Control Plan Comments:   Induction:   IV  Beta Blocker:  Patient is not currently on a Beta-Blocker (No further documentation required).       Informed Consent: Patient understands risks and agrees with Anesthesia plan.  Questions answered. Anesthesia consent signed with patient.  ASA Score: 3     Day of Surgery Review of History & Physical:    H&P update referred to the surgeon.         Ready For Surgery From Anesthesia Perspective.       Past Medical History:   Diagnosis Date    Arthritis     CAD (coronary artery disease)     H/o Coronary artery disease; resolved since heart transplant 2014    Cardiomyopathy     CHF (congestive heart failure)     Previously EF 20% (prior to heart transplant); last EF 55-60%; throught to be 2/2 genetics & DM1    Depression     Controlled "for the most part" on Lexipro    Encounter for blood transfusion     during transplant    GERD (gastroesophageal reflux disease)     Hashimoto's disease     HLD (hyperlipidemia) 6/11/2015    Hypoglycemia unawareness in type 1 diabetes mellitus     Hypothyroid     Initially hyperthyroid 2/2 Hoshimoto's thyroiditis; now on levothyroxine 150 mcg qd    Myocardial infarction     h/o MI x3 prior to heart transplant    Syncope 6/30/2015    Type I diabetes mellitus, well controlled     Well controlled; Dx'd @7y/o; on N insulin 20U BID & Humalog 10U w/meals +SSI; Glu 89 11/9/17; A1c 7.2% 4/5/17    Unspecified essential hypertension 05/29/2014    Well controlled; Last /57 on 11/9/17       "

## 2018-01-11 NOTE — PROGRESS NOTES
Attempted to call report to [facility], but the receiving nurse was off the floor transporting a patient. Sending RN Shelbi remains available at [phone].

## 2018-01-12 NOTE — PROCEDURES
Radiology Post-Procedure Note    Pre Op Diagnosis: Ascites  Post Op Diagnosis: Same    Procedure: Ultrasound Guided Paracentesis    Procedure performed by: Paresh SHEFFIELD, Maribell     Written Informed Consent Obtained: Yes  Specimen Removed: YES cloudy yellow  Estimated Blood Loss: Minimal    Findings:   Successful paracentesis.  Albumin administered PRN per protocol.    Patient tolerated procedure well.    Maribell Yoder, APRN, FNP  Interventional Radiology  [phone] spectralink

## 2018-01-12 NOTE — HPI
42 y.o. male presents with PMH of OHTX in November 2014 (on Prograf, Myfortic, and Prednisone), Hashimoto's thyroiditis, DM 2 and CKD presents with right pleural effusion and bilateral GGOs. Patient was admitted to [facility] about 2 weeks ago for pneumonia. During that time underwent chest CT which showed large right pleural effusion. Patient undergoes monthly paracenteses but has never required pleural drainage. During hospital admission, pulmonology performed a right thoracentesis with 1.5 L of drainage. Patient states his SOB improved after drainage. Pathology with no evidence of malignancy. He has since recovered from pneumonia but repeat CXR showed recurrent right pleural effusion.      Never smoker. Denies EtOH use. Disabled since heart transplant.

## 2018-01-12 NOTE — ASSESSMENT & PLAN NOTE
42 y.o. male presents with PMH of OHTX in November 2014 (on Prograf, Myfortic, and Prednisone), Hashimoto's thyroiditis, DM 2 and CKD presents with right pleural effusion    - Patient requesting paracentesis. He routinely requires monthly drainage. Per Dr. Ferreira, ok to have completed during this admission as long as it is US guided paracentesis. Order placed yesterday, will call IR scheduling this morning.   -  Pleurx teaching complete with patient and his wife yesterday.   - Stable for discharge pending IR paracentesis.

## 2018-01-12 NOTE — HOSPITAL COURSE
1/11/18- Right VATS drainage of effusion, pleural biopsy and pleurx placement   1/12/18- Pain well controlled. Tolerating diet. Stable on room air. Tolerated US guided paracentesis without changes in clinic status. Stable for discharge.

## 2018-01-12 NOTE — SUBJECTIVE & OBJECTIVE
Interval History: NAEON. Pain well controlled. Tolerating diet. Stable on room air.     Medications:  Continuous Infusions:  Scheduled Meds:   aspirin  81 mg Oral Daily    escitalopram oxalate  20 mg Oral QAM    fluticasone  2 spray Each Nare Daily    gabapentin  900 mg Oral TID    levothyroxine  150 mcg Oral QAM    magnesium oxide  400 mg Oral Daily    mycophenolate  720 mg Oral BID    nortriptyline  25 mg Oral QHS    pantoprazole  40 mg Oral Daily    polyethylene glycol  17 g Oral Daily    pravastatin  40 mg Oral QHS    predniSONE  2.5 mg Oral Daily    sodium chloride 0.9%  3 mL Intravenous Q8H    tacrolimus  3 mg Oral BID    tamsulosin  0.4 mg Oral Daily    torsemide  40 mg Oral Daily     PRN Meds:acetaminophen, albuterol, dextrose 50%, dextrose 50%, diphenhydrAMINE, glucagon (human recombinant), glucose, glucose, glucose, insulin aspart, naloxone, ondansetron, oxyCODONE, oxyCODONE, promethazine     Review of patient's allergies indicates:   Allergen Reactions    No known drug allergies      Objective:     Vital Signs (Most Recent):  Temp: 97.2 °F (36.2 °C) (01/12/18 0440)  Pulse: 94 (01/12/18 0440)  Resp: 15 (01/12/18 0440)  BP: 114/70 (01/12/18 0440)  SpO2: (!) 94 % (01/12/18 0440) Vital Signs (24h Range):  Temp:  [96 °F (35.6 °C)-98 °F (36.7 °C)] 97.2 °F (36.2 °C)  Pulse:  [] 94  Resp:  [11-17] 15  SpO2:  [89 %-100 %] 94 %  BP: ()/(53-80) 114/70     Intake/Output - Last 3 Shifts       01/10 0700 - 01/11 0659 01/11 0700 - 01/12 0659 01/12 0700 - 01/13 0659    P.O.  340     I.V. (mL/kg)  1000 (11.7)     Total Intake(mL/kg)  1340 (15.6)     Urine (mL/kg/hr)  700 (0.3)     Other  1000 (0.5)     Total Output   1700      Net   -360             Urine Occurrence  2 x           SpO2: (!) 94 %  O2 Device (Oxygen Therapy): room air    Physical Exam   Constitutional: He is oriented to person, place, and time. He appears well-developed and well-nourished.   HENT:   Head: Normocephalic and  atraumatic.   Eyes: Pupils are equal, round, and reactive to light.   Neck: Normal range of motion.   Cardiovascular: Normal rate, regular rhythm and intact distal pulses.    Pulmonary/Chest: Effort normal and breath sounds normal. He exhibits tenderness.   Abdominal: Soft. Bowel sounds are normal. He exhibits no distension.   Musculoskeletal: Normal range of motion. He exhibits no edema.   Lymphadenopathy:     He has no cervical adenopathy.   Neurological: He is alert and oriented to person, place, and time.   Skin: Skin is warm.   Psychiatric: He has a normal mood and affect.   Vitals reviewed.      Significant Labs:  CBC:   Recent Labs  Lab 01/11/18  1707   WBC 5.48     CMP:   Recent Labs  Lab 01/11/18  1707   ALBUMIN 3.0*   PROT 6.7       Significant Diagnostics:  CXR: I have reviewed all pertinent results/findings within the past 24 hours    VTE Risk Mitigation         Ordered     Medium Risk of VTE  Once      01/11/18 0928     Place sequential compression device  Until discontinued      01/11/18 0928

## 2018-01-12 NOTE — DISCHARGE SUMMARY
[facility]  Thoracic Surgery  Discharge Summary    Patient Name: Lv Crocker  MRN: [id number]  Admission Date: 1/11/2018  Hospital Length of Stay: 1 days  Discharge Date and Time: No discharge date for patient encounter.  Attending Physician: Lv James MD   Discharging Provider: JOSE DANIEL Anderson  Primary Care Provider: Philippe Mohr MD    HPI:   42 y.o. male presents with PMH of OHTX in November 2014 (on Prograf, Myfortic, and Prednisone), Hashimoto's thyroiditis, DM 2 and CKD presents with right pleural effusion and bilateral GGOs. Patient was admitted to [facility] about 2 weeks ago for pneumonia. During that time underwent chest CT which showed large right pleural effusion. Patient undergoes monthly paracenteses but has never required pleural drainage. During hospital admission, pulmonology performed a right thoracentesis with 1.5 L of drainage. Patient states his SOB improved after drainage. Pathology with no evidence of malignancy. He has since recovered from pneumonia but repeat CXR showed recurrent right pleural effusion.      Never smoker. Denies EtOH use. Disabled since heart transplant.     Procedure(s) (LRB):  THORACOSCOPY-VIDEO ASSISTED (VATS) W/PLEURAL BIOPSY (Right)  Insertion-Catheter-Chest (Right)      Hospital Course: 1/11/18- Right VATS drainage of effusion, pleural biopsy and pleurx placement   1/12/18- Pain well controlled. Tolerating diet. Stable on room air. Tolerated US guided paracentesis without changes in clinic status. Stable for discharge.       Consults         Status Ordering Provider     Inpatient consult to Interventional Radiology  Once     Provider:  (Not yet assigned)    Completed FELISA GERARD          Significant Diagnostic Studies: Labs:   BMP: No results for input(s): GLU, NA, K, CL, CO2, BUN, CREATININE, CALCIUM, MG in the last 48 hours., CMP   Recent Labs  Lab 01/11/18  1707   PROT 6.7   ALBUMIN 3.0*   , CBC   Recent Labs  Lab  01/11/18  1707   WBC 5.48    and INR   Lab Results   Component Value Date    INR 1.1 01/12/2018    INR 1.1 11/27/2017    INR 1.1 10/04/2017     Radiology: X-Ray: CXR: X-Ray Chest 1 View (CXR):   Results for orders placed or performed during the hospital encounter of 01/11/18   X-Ray Chest 1 View    Narrative    One view: There is cardiomegaly, mild edema, right pleural fluid, postoperative change, and no change. There is a right chest tube.      Electronically signed by: KELVIN SANTIAGO MD  Date:     01/12/18  Time:    07:54        Pending Diagnostic Studies:     Procedure Component Value Units Date/Time    IR Paracentesis with Imaging [[id number]] Resulted:  01/12/18 1002    Order Status:  Sent Lab Status:  In process Updated:  01/12/18 1053    Protime-INR (PT) [[id number]] Collected:  01/12/18 0906    Order Status:  Sent Lab Status:  In process Updated:  01/12/18 0906    Specimen:  Blood from Blood         Final Active Diagnoses:    Diagnosis Date Noted POA    Pleural effusion [J90] 12/13/2017 Yes      Problems Resolved During this Admission:    Diagnosis Date Noted Date Resolved POA      Discharged Condition: good    Disposition: Home or Self Care    Follow Up:  Follow-up Information     Lv James MD.    Specialty:  Cardiothoracic Surgery  Contact information:  [address] [facility] [address]  [phone]                 Patient Instructions:     Diet Adult Regular     Activity as tolerated     Shower on day dressing removed (No bath)     Lifting restrictions   Order Comments: No more than 20 pounds for 4 weeks.     Notify your health care provider if you experience any of the following:  temperature >100.4     Notify your health care provider if you experience any of the following:  persistent nausea and vomiting or diarrhea     Notify your health care provider if you experience any of the following:  severe uncontrolled pain     Notify your health care provider if you experience any of the  following:  redness, tenderness, or signs of infection (pain, swelling, redness, odor or green/yellow discharge around incision site)     Notify your health care provider if you experience any of the following:  difficulty breathing or increased cough     Notify your health care provider if you experience any of the following:  severe persistent headache     Notify your health care provider if you experience any of the following:  worsening rash     Notify your health care provider if you experience any of the following:  persistent dizziness, light-headedness, or visual disturbances     Notify your health care provider if you experience any of the following:  increased confusion or weakness     Notify your health care provider if you experience any of the following:     Change dressing (specify)   Order Comments: Change dressing with each drainage.       Medications:  Reconciled Home Medications:   Current Discharge Medication List      START taking these medications    Details   oxyCODONE-acetaminophen (PERCOCET) 5-325 mg per tablet Take 1 tablet by mouth every 4 (four) hours as needed for Pain.  Qty: 31 tablet, Refills: 0         CONTINUE these medications which have NOT CHANGED    Details   aspirin (ECOTRIN) 81 MG EC tablet Take 1 tablet (81 mg total) by mouth once daily.  Qty: 30 tablet, Refills: 11      escitalopram oxalate (LEXAPRO) 20 MG tablet Take 1 tablet (20 mg total) by mouth once daily.  Qty: 30 tablet, Refills: 11      gabapentin (NEURONTIN) 300 MG capsule Take 3 capsules (900 mg total) by mouth 3 (three) times daily.  Qty: 270 capsule, Refills: 11    Associated Diagnoses: Status post heart transplant; Acquired hypothyroidism      insulin NPH (NOVOLIN N) 100 unit/mL injection Inject 20 Units into the skin 2 (two) times daily before meals.  Qty: 1 vial, Refills: 0    Associated Diagnoses: Type 1 diabetes mellitus with hyperglycemia      levothyroxine (SYNTHROID) 150 MCG tablet Take 1 tablet (150 mcg  total) by mouth every morning.  Qty: 30 tablet, Refills: 12    Associated Diagnoses: Status post heart transplant; Acquired hypothyroidism      magnesium oxide (MAG-OX) 400 mg tablet Take 1 tablet (400 mg total) by mouth once daily.  Qty: 30 tablet, Refills: 11      mycophenolate (MYFORTIC) 180 MG TbEC Take 4 tablets (720 mg total) by mouth 2 (two) times daily. S/P Heart Transplant 11/18/2014 ICD-10 Z94.1  Qty: 240 tablet, Refills: 11    Associated Diagnoses: Status post heart transplant; Long-term use of immunosuppressant medication; Immunosuppression      nortriptyline (PAMELOR) 25 MG capsule Take 1 capsule (25 mg total) by mouth every evening.  Qty: 30 capsule, Refills: 3    Associated Diagnoses: Status post heart transplant; Acquired hypothyroidism      ondansetron (ZOFRAN-ODT) 4 MG TbDL Take 2 tablets (8 mg total) by mouth every 8 (eight) hours as needed (nausea).  Qty: 15 tablet, Refills: 0    Associated Diagnoses: Nausea      pantoprazole (PROTONIX) 40 MG tablet TAKE ONE TABLET BY MOUTH EVERY DAY  Qty: 30 tablet, Refills: 11      pravastatin (PRAVACHOL) 40 MG tablet Take 1 tablet (40 mg total) by mouth every evening.  Qty: 30 tablet, Refills: 11      predniSONE (DELTASONE) 2.5 MG tablet Take 1 tablet (2.5 mg total) by mouth once daily. S/P Heart Transplant 11/18/2014 ICD-10 Z94.1  Qty: 30 tablet, Refills: 11    Associated Diagnoses: Heart transplanted; Essential hypertension; Chronic kidney disease (CKD), stage III (moderate); Immunosuppression; Status post heart transplant; Long-term use of immunosuppressant medication      tacrolimus (PROGRAF) 1 MG Cap Taked 3mg orally in the morning and 3mg orally in the evening. S/p heart transplant  11/18/2014  ICD-10 Z94.1  Qty: 180 capsule, Refills: 11    Associated Diagnoses: Status post heart transplant; Long-term use of immunosuppressant medication; Immunosuppression      tamsulosin (FLOMAX) 0.4 mg Cp24 TAKE 2 CAPSULES(0.8 MG) BY MOUTH EVERY EVENING  Qty: 60  "capsule, Refills: 1    Associated Diagnoses: Heart transplanted; Essential hypertension; Chronic kidney disease (CKD), stage III (moderate); Immunosuppression      torsemide (DEMADEX) 20 MG Tab Take 2 tablets (40 mg total) by mouth once daily.  Qty: 60 tablet, Refills: 11    Associated Diagnoses: Status post heart transplant      albuterol 90 mcg/actuation inhaler Inhale 2 puffs into the lungs every 6 (six) hours as needed for Shortness of Breath.  Qty: 18 g, Refills: 0    Associated Diagnoses: Shortness of breath      fluticasone (FLONASE) 50 mcg/actuation nasal spray 2 sprays by Each Nare route once daily.  Qty: 1 Bottle, Refills: 12      glucagon (human recombinant) inj 1mg/mL kit Inject 1 mL (1 mg total) into the muscle as needed.  Qty: 1 kit, Refills: 6    Associated Diagnoses: Type 1 diabetes mellitus with hyperglycemia      lancets Misc 1 Device by Misc.(Non-Drug; Combo Route) route 4 (four) times daily with meals and nightly.  Qty: 100 each, Refills: 5    Comments: ON HOLD, patient will request fill once discharged from [facility]      pen needle, diabetic 32 gauge x 5/32" Ndle Uses 5 pen needles a day  Qty: 200 each, Refills: 12             JOSE DANIEL Anderson  Thoracic Surgery  [facility]  "

## 2018-01-12 NOTE — PROGRESS NOTES
Pt arrived to IR room 125 for para, no acute distress noted. Orders and labs reviewed on chart. Awaiting consent. 124

## 2018-01-12 NOTE — CONSULTS
"Radiology Consult    Lv Crocker is a 42 y.o. male with a history of CHF and ascites. IR consulted for paracentesis.    Past Medical History:   Diagnosis Date    Arthritis     Ascites     Thought to be 2/2 heart tpx; liver bx 3/31/17 w/o significant fibrosis    Cardiomyopathy     Depression     Controlled "for the most part" on Lexipro    Encounter for blood transfusion     during transplant    GERD (gastroesophageal reflux disease)     Hashimoto's disease     History of CHF (congestive heart failure)     Previously EF 20% (prior to heart transplant); last EF 60%, PAP 41 as of 12/12/17; throught to be 2/2 genetics & DM1    History of coronary artery disease     H/o Coronary artery disease; resolved since heart transplant 2014    History of myocardial infarction     h/o MI x3 prior to heart transplant    HLD (hyperlipidemia) 6/11/2015    Hypoglycemia unawareness in type 1 diabetes mellitus     Hypothyroid     Initially hyperthyroid 2/2 Hoshimoto's thyroiditis; now on levothyroxine 150 mcg qd    Pleural effusion due to another disorder     Thought to be 2/2 heart tpx; s/p PleuRx catheter placement and removal after 1-2 months    Pulmonary hypertension assoc with unclear multi-factorial mechanisms 12/12/2017    PAP 41 (EF 60%) on ECHO 12/12/17    Syncope 6/30/2015    Type I diabetes mellitus, well controlled     Well controlled; Dx'd @7y/o; on N insulin 20U BID & Humalog 10U w/meals +SSI; Glu 89 11/9/17; A1c 7.2% 4/5/17    Unspecified essential hypertension 05/29/2014    Well controlled; Last /57 on 11/9/17     Past Surgical History:   Procedure Laterality Date    CARDIAC CATHETERIZATION      CARDIAC SURGERY      CHOLECYSTECTOMY      CORONARY ANGIOPLASTY      FOOT SPLIT TRANSFER OF THE POSTERIOR TIBIALIS TENDON PROCEDURE      HEART TRANSPLANT  2014    KNEE SURGERY      PleuRx catheter placement  01/11/2018    Plan for removal after 1-2 months by Dr. James in cardiothoracic " surgery    s/p oht  November 2014    stent placemnet         Imaging reviewed with Radiology staff, Dr. Esat.     Procedure: paracentesis    Scheduled Meds:    aspirin  81 mg Oral Daily    escitalopram oxalate  20 mg Oral QAM    fluticasone  2 spray Each Nare Daily    gabapentin  900 mg Oral TID    levothyroxine  150 mcg Oral QAM    magnesium oxide  400 mg Oral Daily    mycophenolate  720 mg Oral BID    nortriptyline  25 mg Oral QHS    pantoprazole  40 mg Oral Daily    polyethylene glycol  17 g Oral Daily    pravastatin  40 mg Oral QHS    predniSONE  2.5 mg Oral Daily    sodium chloride 0.9%  3 mL Intravenous Q8H    tacrolimus  3 mg Oral BID    tamsulosin  0.4 mg Oral Daily    torsemide  40 mg Oral Daily     Anticoagulation/Antiplatelet: Aspirin 81 mg daily    Continuous Infusions:   PRN Meds:acetaminophen, albuterol, dextrose 50%, dextrose 50%, diphenhydrAMINE, glucagon (human recombinant), glucose, glucose, glucose, insulin aspart, naloxone, ondansetron, oxyCODONE, oxyCODONE, promethazine    Allergies:   Review of patient's allergies indicates:   Allergen Reactions    No known drug allergies        Labs:  No results for input(s): INR in the last 168 hours.    Invalid input(s):  PT,  PTT    Recent Labs  Lab 01/11/18  1707   WBC 5.48      Recent Labs  Lab 01/11/18  1707   ALBUMIN 3.0*         Vitals (Most Recent):  Temp: 97.2 °F (36.2 °C) (01/12/18 0440)  Pulse: 94 (01/12/18 0440)  Resp: 15 (01/12/18 0440)  BP: 114/70 (01/12/18 0440)  SpO2: (!) 94 % (01/12/18 0440)    Plan:   Patient scheduled for paracentesis.    Leonardo Woods MD  PGY-5  Department of Radiology  [phone]

## 2018-01-12 NOTE — PROGRESS NOTES
Para completed, pt tolerated well. No apparent distress noted. 2.5 Liters removed from left abd, mepore applied CDI. Report called to [facility] Jaime. Pt awaiting transport via stretcher.

## 2018-01-12 NOTE — H&P
".  Inpatient Radiology Pre-procedure Note    History of Present Illness:  Lv Crocker is a 42 y.o. male who presents for ultrasound guided paracentesis.  Admission H&P reviewed.  Past Medical History:   Diagnosis Date    Arthritis     Ascites     Thought to be 2/2 heart tpx; liver bx 3/31/17 w/o significant fibrosis    Cardiomyopathy     Depression     Controlled "for the most part" on Lexipro    Encounter for blood transfusion     during transplant    GERD (gastroesophageal reflux disease)     Hashimoto's disease     History of CHF (congestive heart failure)     Previously EF 20% (prior to heart transplant); last EF 60%, PAP 41 as of 12/12/17; throught to be 2/2 genetics & DM1    History of coronary artery disease     H/o Coronary artery disease; resolved since heart transplant 2014    History of myocardial infarction     h/o MI x3 prior to heart transplant    HLD (hyperlipidemia) 6/11/2015    Hypoglycemia unawareness in type 1 diabetes mellitus     Hypothyroid     Initially hyperthyroid 2/2 Hoshimoto's thyroiditis; now on levothyroxine 150 mcg qd    Pleural effusion due to another disorder     Thought to be 2/2 heart tpx; s/p PleuRx catheter placement and removal after 1-2 months    Pulmonary hypertension assoc with unclear multi-factorial mechanisms 12/12/2017    PAP 41 (EF 60%) on ECHO 12/12/17    Syncope 6/30/2015    Type I diabetes mellitus, well controlled     Well controlled; Dx'd @7y/o; on N insulin 20U BID & Humalog 10U w/meals +SSI; Glu 89 11/9/17; A1c 7.2% 4/5/17    Unspecified essential hypertension 05/29/2014    Well controlled; Last /57 on 11/9/17     Past Surgical History:   Procedure Laterality Date    CARDIAC CATHETERIZATION      CARDIAC SURGERY      CHOLECYSTECTOMY      CORONARY ANGIOPLASTY      FOOT SPLIT TRANSFER OF THE POSTERIOR TIBIALIS TENDON PROCEDURE      HEART TRANSPLANT  2014    KNEE SURGERY      PleuRx catheter placement  01/11/2018    Plan for " removal after 1-2 months by Dr. James in cardiothoracic surgery    s/p oht  November 2014    stent placemnet         Review of Systems:   As documented in primary team H&P    Home Meds:   Prior to Admission medications    Medication Sig Start Date End Date Taking? Authorizing Provider   aspirin (ECOTRIN) 81 MG EC tablet Take 1 tablet (81 mg total) by mouth once daily. 12/11/14  Yes Chandni Fernandez MD   escitalopram oxalate (LEXAPRO) 20 MG tablet Take 1 tablet (20 mg total) by mouth once daily.  Patient taking differently: Take 20 mg by mouth every morning.  12/23/16  Yes Chandni Fernandez MD   gabapentin (NEURONTIN) 300 MG capsule Take 3 capsules (900 mg total) by mouth 3 (three) times daily. 5/3/17  Yes Chandni Fernandez MD   insulin NPH (NOVOLIN N) 100 unit/mL injection Inject 20 Units into the skin 2 (two) times daily before meals. 11/30/17 11/30/18 Yes Kamala Jo NP   levothyroxine (SYNTHROID) 150 MCG tablet Take 1 tablet (150 mcg total) by mouth every morning. 5/31/17  Yes Monica Fernandez DNP, NP   magnesium oxide (MAG-OX) 400 mg tablet Take 1 tablet (400 mg total) by mouth once daily. 12/20/16  Yes Chandni Fernandez MD   mycophenolate (MYFORTIC) 180 MG TbEC Take 4 tablets (720 mg total) by mouth 2 (two) times daily. S/P Heart Transplant 11/18/2014 ICD-10 Z94.1 10/20/16  Yes Jose A Lloyd MD   nortriptyline (PAMELOR) 25 MG capsule Take 1 capsule (25 mg total) by mouth every evening. 4/27/17  Yes Kanika Ferreira MD   ondansetron (ZOFRAN-ODT) 4 MG TbDL Take 2 tablets (8 mg total) by mouth every 8 (eight) hours as needed (nausea). 6/21/17  Yes Philippe Mohr MD   pantoprazole (PROTONIX) 40 MG tablet TAKE ONE TABLET BY MOUTH EVERY DAY 8/15/16  Yes Tim Mao NP   pravastatin (PRAVACHOL) 40 MG tablet Take 1 tablet (40 mg total) by mouth every evening. 4/11/17  Yes Alexandr Hernández MD   predniSONE (DELTASONE) 2.5 MG tablet Take 1 tablet (2.5 mg total) by mouth once daily. S/P  "Heart Transplant 11/18/2014 ICD-10 Z94.1 1/10/18  Yes Alexandr Hernández MD   tacrolimus (PROGRAF) 1 MG Cap Taked 3mg orally in the morning and 3mg orally in the evening. S/p heart transplant  11/18/2014  ICD-10 Z94.1 12/4/17  Yes Kanika Ferreira MD   tamsulosin (FLOMAX) 0.4 mg Cp24 TAKE 2 CAPSULES(0.8 MG) BY MOUTH EVERY EVENING 9/28/17  Yes Alexandr Hernández MD   torsemide (DEMADEX) 20 MG Tab Take 2 tablets (40 mg total) by mouth once daily. 1/3/18 1/3/19 Yes Alexandr Hernández MD   albuterol 90 mcg/actuation inhaler Inhale 2 puffs into the lungs every 6 (six) hours as needed for Shortness of Breath. 12/10/17 12/10/18  Teresa Isaac NP   fluticasone (FLONASE) 50 mcg/actuation nasal spray 2 sprays by Each Nare route once daily. 4/28/17   Magalis Vázquez PA-C   glucagon (human recombinant) inj 1mg/mL kit Inject 1 mL (1 mg total) into the muscle as needed. 11/30/17 11/30/18  Kamala Jo NP   lancets Misc 1 Device by Misc.(Non-Drug; Combo Route) route 4 (four) times daily with meals and nightly. 12/24/14   JOSE DANIEL Haskins   pen needle, diabetic 32 gauge x 5/32" Ndle Uses 5 pen needles a day 4/5/17   Monica Fernandez DNP, NP     Scheduled Meds:    aspirin  81 mg Oral Daily    escitalopram oxalate  20 mg Oral QAM    fluticasone  2 spray Each Nare Daily    gabapentin  900 mg Oral TID    levothyroxine  150 mcg Oral QAM    magnesium oxide  400 mg Oral Daily    mycophenolate  720 mg Oral BID    nortriptyline  25 mg Oral QHS    pantoprazole  40 mg Oral Daily    polyethylene glycol  17 g Oral Daily    pravastatin  40 mg Oral QHS    predniSONE  2.5 mg Oral Daily    sodium chloride 0.9%  3 mL Intravenous Q8H    tacrolimus  3 mg Oral BID    tamsulosin  0.4 mg Oral Daily    torsemide  40 mg Oral Daily     Continuous Infusions:   PRN Meds:acetaminophen, albuterol, dextrose 50%, dextrose 50%, diphenhydrAMINE, glucagon (human recombinant), glucose, glucose, glucose, insulin aspart, " naloxone, ondansetron, oxyCODONE, oxyCODONE, promethazine  Anticoagulants/Antiplatelets: aspirin    Allergies:   Review of patient's allergies indicates:   Allergen Reactions    No known drug allergies      Sedation Hx: have not been any systemic reactions    Labs:    Recent Labs  Lab 01/12/18  0906   INR 1.1       Recent Labs  Lab 01/11/18  1707   WBC 5.48      Recent Labs  Lab 01/11/18  1707   ALBUMIN 3.0*         Vitals:  Temp: 98.2 °F (36.8 °C) (01/12/18 0819)  Pulse: 94 (01/12/18 1005)  Resp: 16 (01/12/18 1005)  BP: 110/70 (01/12/18 1005)  SpO2: 95 % (01/12/18 1005)     Physical Exam:  ASA: 2  Mallampati: n/a    General: no acute distress  Mental Status: alert and oriented to person, place and time  HEENT: normocephalic, atraumatic  Chest: unlabored breathing  Heart: regular heart rate  Abdomen: distended  Extremity: moves all extremities    Plan: ultrasound guided paracentesis  Sedation Plan: local    VAISHALI Colby, FNP  Interventional Radiology  [phone] [employer]

## 2018-01-15 NOTE — TELEPHONE ENCOUNTER
Called to check on the pt after hospital d/c, he is recovering well at home. He reports that his wife drained his pleurx yesterday, he drained 900mL of yellow colored drainage. He did pass a large blood clot during the draining, however, it passed with no issue. Discussed that he may experience blood clots during the draining process, however, he should let us know if a clot is unable to pass and clogs the drain. He voices understanding that he may need to come into the office to have his pleurx flushed in the event it becomes clogged.   Also discussed painful drainage and the process of the lung re-expanding during draining. He will have his wife slow down the draining process or clamp to allow him to take deep breaths to assist with the pain. Also recommended taking a pain pill prior to draining.   He is agreeable to CXR and Dr. James on 1/19. He and his wife have our phone # to call with needs. His wife will drain him after work on Tuesday and Thursday.

## 2018-01-16 NOTE — OP NOTE
DATE OF PROCEDURE:  01/11/2018    PREOPERATIVE DIAGNOSIS:  Recurrent right pleural effusion.    POSTOPERATIVE DIAGNOSIS:  Recurrent right pleural effusion.    PROCEDURES:  Right VATS with drainage and pleural biopsy with insertion of a   PleurX catheter.    SURGEON:  Lv James M.D.    FIRST ASSISTANT:  Dr. Casa Wade.    ANESTHESIA:  General.    INDICATIONS FOR PROCEDURE:  Mr. Crocker is a 42-year-old who has a history of   heart failure, he is status post orthotopic heart transplant.  He also has a   history of liver failure and has a recurrent right pleural effusion.  It was   felt that the patient should undergo a VATS drainage, pleural biopsy, and PleurX   catheter insertion.    PROCEDURE IN DETAIL:  The patient was taken to the Operating Room and placed   supine on the operating table.  The patient was identified.  IV antibiotics were   given.  The patient was intubated and positioned under left lateral decubitus   position.  All pressure points were protected.  The patient's entire right chest   was prepped and draped in a sterile fashion.  The chest was entered in the   sixth interspace posterior axillary line.  I immediately encountered a moderate   amount of serous effusion.  The fluid was drained.  An additional 1 cm incision   was made anteriorly along the anterior axillary line and through this incision,   a pleural biopsy was performed.  I then placed a PleurX catheter along the right   chest wall tunneling it such that the PleurX catheter cuff was contained within   the soft tissues of the chest wall.  The catheter was placed into a dependent   position within the right chest along the paravertebral gutter.  The catheter   was exteriorized via a stab incision.  The port sites were closed in two layers   using 3-0 Vicryl suture.  Steri-Strips and a sterile dressing were applied.  The   biopsy specimen was sent for pathologic analysis.    ILv M.D., attest that I  was present for and scrubbed for the   entire procedure.  Furthermore, I performed the critical and key portions of the   procedure as the surgeon.      MIREILLE  dd: 01/15/2018 16:22:31 (CST)  td: 01/15/2018 18:49:01 (CST)  Doc ID   #[id number]  Job ID #[id number]    CC:

## 2018-01-19 NOTE — PROGRESS NOTES
Subjective:       Patient ID: Lv Crocker is a 42 y.o. male.    Chief Complaint: Post-op Evaluation    HPI   42 y.o. male presents with PMH of OHTX in November 2014 (on Prograf, Myfortic, and Prednisone), Hashimoto's thyroiditis, DM 2 and CKD returns 1 week s/p right VATS drainage of effusion and Pleurx placement. Reports he is draining 700-800mL every other day. Endorses pain with drainage but he feels this improved with the last drainage. Denies fever, chills, SOB, cough, N/V or changes in bowel and bladder functioning.      Review of Systems   Constitutional: Negative for activity change, appetite change, diaphoresis and fatigue.   HENT: Negative for trouble swallowing and voice change.    Eyes: Negative.    Respiratory: Positive for chest tightness and shortness of breath. Negative for cough and wheezing.    Cardiovascular: Negative for palpitations and leg swelling.   Gastrointestinal: Negative for abdominal distention, abdominal pain, diarrhea, nausea and vomiting.   Endocrine: Negative.    Genitourinary: Negative.    Musculoskeletal: Negative.    Allergic/Immunologic: Negative.    Neurological: Negative.    Hematological: Negative.    Psychiatric/Behavioral: Negative.        Objective:      Physical Exam   Constitutional: He is oriented to person, place, and time. He appears well-developed and well-nourished.   HENT:   Head: Normocephalic.   Eyes: Pupils are equal, round, and reactive to light.   Neck: Normal range of motion. Neck supple.   Cardiovascular: Normal rate, regular rhythm, normal heart sounds and intact distal pulses.    Pulmonary/Chest: Effort normal and breath sounds normal. No respiratory distress. He exhibits no tenderness.   Abdominal: Soft. Bowel sounds are normal. He exhibits no distension. There is no tenderness.   Musculoskeletal: Normal range of motion. He exhibits no edema.   Lymphadenopathy:     He has no cervical adenopathy.   Neurological: He is alert and oriented to  person, place, and time.   Skin: Skin is warm.   Psychiatric: He has a normal mood and affect.   Vitals reviewed.        1/19/18 CXR-   Postoperative changes again noted in the thorax.  The heart does not appear significantly enlarged, and the cardiomediastinal silhouette and the appearance of the pulmonary vascularity are unchanged since the examination referenced above.  Linear opacity representing subsegmental atelectasis in the right mid lung zone is again seen, as is opacity in the inferior hemithorax on the right representing a combination of basilar airspace consolidation/volume loss and pleural fluid on this side.  The lung zones, however, appear stable since the prior study, with the left lung and the right upper lung zone remaining clear, with no superimposed airspace consolidation or volume loss observed.  No pleural fluid on the left.  Subcutaneous emphysema along the inferolateral right thoracic wall again seen, though it has diminished in volume.  No pneumothorax.    Assessment:       42 y.o. male presents with PMH of OHTX in November 2014 (on Prograf, Myfortic, and Prednisone), Hashimoto's thyroiditis, DM 2 and CKD returns 1 week s/p right VATS drainage of effusion and Pleurx placement.    Plan:       Gave patient kits today while we're working through insurance problems.   RTC in 1 month with repeat CXR      ATTENDING ATTESTATION:    I evaluated the patient and I agree with the assessment and plan.  I advised the patient to extend the interval between drainage sessions only if the current output volume decreases by 50% or more.

## 2018-01-23 NOTE — PROGRESS NOTES
D/c instructions explained and copy given to patient. Pt verbalized understanding. bandaid to healthy site c/d/i. Pt wheeled to personal vehicle driven by son.

## 2018-01-23 NOTE — PROGRESS NOTES
Procedure complete 2.75L chylous fluid removed from abdomen. Pt tolerated well, vss. bandaid placed to healthy site.

## 2018-01-23 NOTE — DISCHARGE SUMMARY
Sterile technique was performed in the LLQ, lidocaine was used as a local anesthetic.  2.75 liters of chylous fluid removed for therapeutic purposes.  Pt tolerated the procedure well without immediate complications.  Please see radiologist report for details. F/u with PCP and/or ordering physician.

## 2018-01-23 NOTE — DISCHARGE INSTRUCTIONS

## 2018-01-24 NOTE — TELEPHONE ENCOUNTER
I spoke with patient and scheduled him appointment with Dr Robles and appointment for procrit after/see Dr Robles's result note

## 2018-01-30 NOTE — TELEPHONE ENCOUNTER
Spoke with the pt, he is ok with the [employer] rep dropping off a case of drainage kits to his home this week. No resolution has been found for the patient's insurance to cover the kits. [facility] is currently working on assisting with insurance coverage and finding a company in network to provide kits.

## 2018-01-31 NOTE — TELEPHONE ENCOUNTER
Patient drained last night, just air came out. His previous draining was also much less. He will decrease his frequency to twice a week and then once a week if his low output continues. He will drain again on Saturday.  A case of pleurx drain kits were delivered to the patient today.

## 2018-02-01 NOTE — PATIENT INSTRUCTIONS
[facility]  [address]  [phone] phone     [phone] fax  Hours of Operation: Monday- Friday 8:00am- 5:00pm  After hours phone  [phone]  Hematology / Oncology Physicians on call      Dr. Zane Muro                        Please call with any concerns regarding your appointment today.

## 2018-02-01 NOTE — PROGRESS NOTES
Reason for visit: Chronic normocytic anemia    HPI:   The patient is a 42-year-old  male who presents to the clinic today for follow up to discuss further evaluation and management recommendations for chronic normocytic anemia.  The patient has had treatment of antibody mediated rejection with regard to his history of heart transplant.  During the course of that hospitalization he was also noted to be neutropenic and was also treated for C. difficile colitis.  Hem/Onc evaluated him and a bone marrow aspiration and biopsy was also performed.  He was given intermittent Neupogen injections for support with regard to his neutropenia.  The patient also had treatment for CMV infection and was treated with IV ganciclovir.  He was previously also on by mouth valganciclovir which has since been stopped.  He continues to be on Prograf, mycophenolate and prednisone for immunosuppression.    Today the patient reports that overall he feels okay.  He has right Pleurx catheter.  The patient denies any fevers, chills or night sweats at this time.  He denies any melena, hematochezia, and emesis, hemoptysis or hematuria.  He denies any chest pain. He has been having paracentesis at periodic intervals  I have reviewed all of the details with regard to the patient's medical history available in Epic and have utilized this in my evaluation and management recommendations today.    PAST MEDICAL HISTORY:   1.  Type 1 diabetes mellitus  2.  Hypothyroidism due to Hashimoto's thyroiditis  3.  Chronic kidney disease stage III  4.  Restrictive cardiomyopathy  5.  CMV infection  6.  Chronic immunosuppression  7.  C. difficile colitis  8.  Osteopenia  9.  BPH  10. 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  Bilateral arthroscopy of the knees  2.  Right ankle fusion  3.  Cholecystectomy    FAMILY HISTORY: The patient's brother  from complications related to pancreatic cancer at the age of 51.   He denies any other immediate family members with cancer or bleeding/clotting disorders.    SOCIAL HISTORY: He does not smoke cigarettes.  He does not drink alcohol.  He has never used any recreational drugs.  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  He lives in [place], [place].    ALLERGIES: [NKDA]    MEDICATIONS: [Medcard has been reviewed and/or reconciled.]    REVIEW OF SYSTEMS:   GENERAL: [No fevers, chills or sweats. Reports chronic fatigue. Denies weight loss or loss of appetite.]  HEENT: [No blurred vision, tinnitus, nasal discharge, sorethroat or dysphagia.]  HEART: [No chest pain, palpitations or shortness of breath.]    LUNGS: [No cough, hemoptysis or breathing problems.]  ABDOMEN: [No abdominal pain, nausea, vomiting, diarrhea, constipation or melena.]  GENITOURINARY: [No dysuria, bleeding or malodorous discharge.]  NEURO: [No headache, dizziness or vertigo.]  HEMATOLOGY: [No easy bruising, spontaneous bleeding or blood clots in the past].  MUSCULOSKELETAL: [No arthralgias, myalgias or bone pains.]  SKIN: [No rashes or skin lesions.]  PSYCHIATRY: [No depression or anxiety.]    PHYSICAL EXAMINATION:   VS: Reviewed on nurse's notes.  APPEARANCE: The patient is a chronically ill appearing and well-groomed  male who appears in no acute distress.  HEENT: No scleral icterus. Both external auditory canals clear. No oral ulcers, lesions. Throat clear  HEAD: No sinus tenderness.  NECK: Supple. No palpable lymphadenopathy. Thyroid non-tender, no palpable masses  CHEST: Decreased breath sounds in the base of right lung likely due to pleural effusion.  No rales. No rhonchi. Unlabored respirations.  CARDIOVASCULAR: Normal S1, S2. Normal rate. Regular rhythm.  ABDOMEN: Bowel sounds normal. No tenderness. No palpable hepatosplenomegaly. Mild abdominal distension.  LYMPHATIC: No palpable supraclavicular, axillary nodes  EXTREMITIES: No clubbing, cyanosis.   Trace pitting edema of bilateral lower extremities.  SKIN: No lesions. No petechiae. No ecchymoses. No induration or nodules  NEUROLOGIC: No focal findings. Alert & Oriented x 3. Mood appropriate to affect    LABS:   Reviewed    IMAGING:  Reviewed    IMPRESSION:  1.  Chronic normocytic anemia  2.  Orthotopic heart transplant with chronic immunosuppression  3.  Iron deficiency anemia  4.  Anemia due to chronic kidney disease    PLAN:  1.  I had a detailed discussion with the patient today with regard to his current clinical situation.  The results of his bone marrow aspiration and biopsy from May 2015 were previously discussed in detail with the patient.  He did have decreased storage iron.  He has been given 2 doses of IV iron in the past and tolerated them well. He is on Procrit injections as needed for treatment of anemia due to chronic kidney disease. There was no definitive evidence of leukemia or lymphoma noted on the results of his bone marrow aspiration and biopsy.  Cytogenetics were normal.  2. Results of most recent CBC show Hb<10 gm/dl. Restart weekly procrit injections x 4 weeks at this time.    Follow up in 2 months with labs. Possible procrit at follow up. He knows to call sooner for any new problems or questions.    Adolfo Robles MD

## 2018-02-07 NOTE — PROGRESS NOTES
Subjective:       Patient ID: Lv Crocker is a 42 y.o. male.    Chief Complaint: Post-op Evaluation    HPI   42 y.o. male presents with PMH of OHTX in November 2014 (on Prograf, Myfortic, and Prednisone), Hashimoto's thyroiditis, DM 2 and CKD returns for f/u s/p right VATS drainage of effusion and Pleurx placement. Reports initally draining 700-800mL every other day but about 2 weeks ago drainage 350cc. Over last 2 weeks multiple attempts but no output. Today endorses occasional pleuritic pain, slightly increased AGUILAR, and intermittent dry cough. Last paracentesis about 1 week ago. Denies fever, chills, SOB, cough, N/V or changes in bowel and bladder functioning.      Review of Systems   Constitutional: Negative for activity change, appetite change, diaphoresis and fatigue.   HENT: Negative for trouble swallowing and voice change.    Eyes: Negative.    Respiratory: Positive for chest tightness and shortness of breath. Negative for cough and wheezing.    Cardiovascular: Negative for palpitations and leg swelling.   Gastrointestinal: Negative for abdominal distention, abdominal pain, diarrhea, nausea and vomiting.   Endocrine: Negative.    Genitourinary: Negative.    Musculoskeletal: Negative.    Allergic/Immunologic: Negative.    Neurological: Negative.    Hematological: Negative.    Psychiatric/Behavioral: Negative.        Objective:      Physical Exam   Constitutional: He is oriented to person, place, and time. He appears well-developed and well-nourished.   HENT:   Head: Normocephalic.   Eyes: Pupils are equal, round, and reactive to light.   Neck: Normal range of motion. Neck supple.   Cardiovascular: Normal rate, regular rhythm, normal heart sounds and intact distal pulses.    Pulmonary/Chest: Effort normal. No respiratory distress. He has decreased breath sounds in the right lower field. He exhibits no tenderness.   Abdominal: Soft. Bowel sounds are normal. He exhibits no distension. There is no  tenderness.   Musculoskeletal: Normal range of motion. He exhibits no edema.   Lymphadenopathy:     He has no cervical adenopathy.   Neurological: He is alert and oriented to person, place, and time.   Skin: Skin is warm and dry.   Right pleurX in place. No drainage or erythema. VATS incisions healing.    Psychiatric: He has a normal mood and affect. Thought content normal.   Vitals reviewed.        2/7/18:  Postop changes noted.  Heart size and pulmonary vessels are similar.  Continued right pleural effusion with linear bands of atelectasis or consolidation at the right base similar.  Pleural drain remains.  Left lung is clear.    Assessment:       42 y.o. male presents with PMH of OHTX in November 2014 (on Prograf, Myfortic, and Prednisone), Hashimoto's thyroiditis, DM 2 and CKD returns for f/u right VATS drainage of effusion and Pleurx placement. Drainage abruptly stopped.     Plan:       Attempted to flush pleurX but there was significant resistance felt. With continued attempts able to flush catheter. Connected to atrium with <5cc output. Decision was made to remove pleurX. Removed without complication. Inspected pleurx and fibrinous fragments were noted to be clogging most fenestrations.   Will repeat an CXR in 1 week to assess for re accumulation of pleural effusion. May need intermittent thoracenteses if increasing symptomatic pleural effusion.     ATTENDING ATTESTATION:    I evaluated the patient and I agree with the assessment and plan.  PleuRx catheter removed.  Fenestration sites were all plugged with soft tissue.  Repeat cxr in 1 week.  If pleural effusion increases, plan US-guided thoracentesis.   Patient and wife informed that he may have spontaneous drainage through the PleuRx exit site.  If this occurs, just reinforces the site with gauze, towel or washcloth as it indicates that the effusion is decompressing.

## 2018-02-09 NOTE — NURSING
Injection given without difficulties.Bandaid applied. Patient instructed to stay in the clinic for 15 minutes. Patient verbalized understanding and will notify nurse with any complaints.

## 2018-02-11 PROBLEM — R19.7 DIARRHEA: Status: ACTIVE | Noted: 2018-01-01

## 2018-02-11 PROBLEM — R06.02 SOB (SHORTNESS OF BREATH): Status: ACTIVE | Noted: 2018-01-01

## 2018-02-11 NOTE — ASSESSMENT & PLAN NOTE
S/P OHTx 11/8/14 (high risk)  - history of AMR treated with PP and IVIG 4/2016  - Continue Prograf 3/3, Prednisone 2.5mg daily and Myfortic 720 BID  - Prograf goal 6-8. Will get level in the AM

## 2018-02-11 NOTE — ED NOTES
"Pt stated "recently had r side chest tube was removed few days ago but been having cough worse since chest tube removed started running fever and having diarrhea yesterday"  Hx of heart transplant in 2014.  Pt placed on crm, nibp spo2. \  piv to left hand blood cultures, labs drawn and sent   Fluids infusing   Bandage to right lateral chest noted with no drainage   "

## 2018-02-11 NOTE — HPI
Lv Crocker is a 41 yo WM s/p OHTX 11/14, complicated by AMR, treated 04/15 with 5 days PP, IVIG x 2 doses, was scheduled for Rituximab on 5/1/15 but developed pancytopenia in April 2015 who comes in for a clinic visit. Angiogram 2/16 with IVUS showed no evidence of CAV. 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 He was getting monthly paracentesis and thoracentesis until he underwent a VATS with pleurX catheter placement on 1/11/2018. He had been draining 700-800cc of fluid every 2 days until about 3 weeks ago, where the drainage had decreased. He went to see Dr. James in clinic on Wednesday and it was decided to remove the catheter. Since the removal, he reports not feeling well and having low grade fever no higher than 100.2. He has noted hourly watery diarrhea that is reminiscent of when he had C-diff, but does not have the same odor as then. He reported some dysuria yesterday. No vomiting. He has noted increasing dry cough and shortness of breath similar to when he was last diagnosed with pneumonia. He reports no vomiting, and no nasal congestion or sore throat. He has decreased appetite.

## 2018-02-11 NOTE — H&P
"[facility]  Heart Transplant  H&P    Patient Name: Lv Crocker  MRN: [id number]  Admission Date: 2/11/2018  Attending Physician: Ry Augustine MD  Primary Care Provider: Philippe Mohr MD  Principal Problem:SOB (shortness of breath)    Subjective:     History of Present Illness:  "Lv Crocker is a 41 yo WM s/p OHTX 11/14, complicated by AMR, treated 04/15 with 5 days PP, IVIG x 2 doses, was scheduled for Rituximab on 5/1/15 but developed pancytopenia in April 2015 who comes in for a clinic visit. Angiogram 2/16 with IVUS showed no evidence of CAV. 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 He was getting monthly paracentesis and thoracentesis until he underwent a VATS with pleurX catheter placement on 1/11/2018. He had been draining 700-800cc of fluid every 2 days until about 3 weeks ago, where the drainage had decreased. He went to see Dr. James in clinic on Wednesday and it was decided to remove the catheter. Since the removal, he reports not feeling well and having low grade fever no higher than 100.2. He has noted hourly watery diarrhea that is reminiscent of when he had C-diff, but does not have the same odor as then. He reported some dysuria yesterday. No vomiting. He has noted increasing dry cough and shortness of breath similar to when he was last diagnosed with pneumonia. He reports no vomiting, and no nasal congestion or sore throat. He has decreased appetite."     Past Medical History:   Diagnosis Date    Arthritis     Ascites     Thought to be 2/2 heart tpx; liver bx 3/31/17 w/o significant fibrosis    Cardiomyopathy     Depression     Controlled "for the most part" on Lexipro    Encounter for blood transfusion     during transplant    GERD (gastroesophageal reflux disease)     Hashimoto's disease     History of CHF (congestive heart failure)     " Previously EF 20% (prior to heart transplant); last EF 60%, PAP 41 as of 12/12/17; throught to be 2/2 genetics & DM1    History of coronary artery disease     H/o Coronary artery disease; resolved since heart transplant 2014    History of myocardial infarction     h/o MI x3 prior to heart transplant    HLD (hyperlipidemia) 6/11/2015    Hypoglycemia unawareness in type 1 diabetes mellitus     Hypothyroid     Initially hyperthyroid 2/2 Hoshimoto's thyroiditis; now on levothyroxine 150 mcg qd    Pleural effusion due to another disorder     Thought to be 2/2 heart tpx; s/p PleuRx catheter placement and removal after 1-2 months    Pulmonary hypertension assoc with unclear multi-factorial mechanisms 12/12/2017    PAP 41 (EF 60%) on ECHO 12/12/17    Syncope 6/30/2015    Type I diabetes mellitus, well controlled     Well controlled; Dx'd @7y/o; on N insulin 20U BID & Humalog 10U w/meals +SSI; Glu 89 11/9/17; A1c 7.2% 4/5/17    Unspecified essential hypertension 05/29/2014    Well controlled; Last /57 on 11/9/17     Past Surgical History:   Procedure Laterality Date    CARDIAC CATHETERIZATION      CARDIAC SURGERY      CHOLECYSTECTOMY      CORONARY ANGIOPLASTY      FOOT SPLIT TRANSFER OF THE POSTERIOR TIBIALIS TENDON PROCEDURE      HEART TRANSPLANT  2014    KNEE SURGERY      PleuRx catheter placement  01/11/2018    Plan for removal after 1-2 months by Dr. James in cardiothoracic surgery    s/p oht  November 2014    stent placemnet       Review of patient's allergies indicates:   Allergen Reactions    No known drug allergies      Current Facility-Administered Medications   Medication    [START ON 2/12/2018] aspirin EC tablet 81 mg    dextrose 50% injection 12.5 g    dextrose 50% injection 25 g    [START ON 2/12/2018] escitalopram oxalate tablet 20 mg    gabapentin capsule 900 mg    glucagon (human recombinant) injection 1 mg    glucose chewable tablet 16 g    glucose chewable tablet 24 g     heparin (porcine) injection 5,000 Units    insulin aspart pen 1-10 Units    insulin aspart pen 3 Units    insulin detemir pen 15 Units    [START ON 2/12/2018] levothyroxine tablet 125 mcg    [START ON 2/12/2018] magnesium oxide tablet 400 mg    mycophenolate EC tablet 720 mg    nortriptyline capsule 25 mg    ondansetron disintegrating tablet 8 mg    [START ON 2/12/2018] pantoprazole EC tablet 40 mg    [START ON 2/12/2018] pravastatin tablet 40 mg    [START ON 2/12/2018] predniSONE tablet 2.5 mg    sodium chloride 0.9% flush 3 mL    tacrolimus capsule 3 mg    tamsulosin 24 hr capsule 0.8 mg    [START ON 2/12/2018] torsemide tablet 40 mg     Current Outpatient Prescriptions   Medication Sig    aspirin (ECOTRIN) 81 MG EC tablet Take 1 tablet (81 mg total) by mouth once daily.    escitalopram oxalate (LEXAPRO) 20 MG tablet Take 1 tablet (20 mg total) by mouth once daily. (Patient taking differently: Take 20 mg by mouth every morning. )    gabapentin (NEURONTIN) 300 MG capsule Take 3 capsules (900 mg total) by mouth 3 (three) times daily.    insulin NPH (NOVOLIN N) 100 unit/mL injection Inject 20 Units into the skin 2 (two) times daily before meals.    lancets Misc 1 Device by Misc.(Non-Drug; Combo Route) route 4 (four) times daily with meals and nightly.    levothyroxine (SYNTHROID) 150 MCG tablet Take 1 tablet (150 mcg total) by mouth every morning.    magnesium oxide (MAG-OX) 400 mg tablet Take 1 tablet (400 mg total) by mouth once daily.    mycophenolate (MYFORTIC) 180 MG TbEC Take 4 tablets (720 mg total) by mouth 2 (two) times daily. S/P Heart Transplant 11/18/2014 ICD-10 Z94.1    nortriptyline (PAMELOR) 25 MG capsule Take 1 capsule (25 mg total) by mouth every evening.    ondansetron (ZOFRAN-ODT) 4 MG TbDL Take 2 tablets (8 mg total) by mouth every 8 (eight) hours as needed (nausea).    pantoprazole (PROTONIX) 40 MG tablet TAKE ONE TABLET BY MOUTH EVERY DAY    pen needle, diabetic 32  "gauge x 5/32" Ndle Uses 5 pen needles a day    pravastatin (PRAVACHOL) 40 MG tablet Take 1 tablet (40 mg total) by mouth every evening. (Patient taking differently: Take 40 mg by mouth once daily. )    predniSONE (DELTASONE) 2.5 MG tablet Take 1 tablet (2.5 mg total) by mouth once daily. S/P Heart Transplant 11/18/2014 ICD-10 Z94.1    tacrolimus (PROGRAF) 1 MG Cap Taked 3mg orally in the morning and 3mg orally in the evening. S/p heart transplant  11/18/2014  ICD-10 Z94.1    tamsulosin (FLOMAX) 0.4 mg Cp24 TAKE 2 CAPSULES(0.8 MG) BY MOUTH EVERY EVENING    torsemide (DEMADEX) 20 MG Tab Take 2 tablets (40 mg total) by mouth once daily.    glucagon (human recombinant) inj 1mg/mL kit Inject 1 mL (1 mg total) into the muscle as needed.    oxyCODONE-acetaminophen (PERCOCET) 5-325 mg per tablet Take 1 tablet by mouth every 4 (four) hours as needed for Pain.     Family History     Problem Relation (Age of Onset)    Cancer Brother (50)    Diabetes Mother, Father, Brother, Son, Sister, Maternal Uncle, Paternal Aunt, Maternal Grandmother, Maternal Grandfather    Heart disease Mother, Brother    Hypertension Mother, Father, Brother    Kidney disease Mother, Father    Stroke Father, Brother    Vision loss Brother        Social History Main Topics    Smoking status: Never Smoker    Smokeless tobacco: Never Used    Alcohol use No    Drug use: No    Sexual activity: Yes     Partners: Female     Review of Systems   Constitution: Positive for decreased appetite, fever and malaise/fatigue. Negative for chills.   HENT: Negative.  Negative for congestion, ear pain, odynophagia and sore throat.    Cardiovascular: Positive for dyspnea on exertion. Negative for chest pain, irregular heartbeat, leg swelling, orthopnea, palpitations and paroxysmal nocturnal dyspnea.   Respiratory: Positive for dry cough and shortness of breath. Negative for wheezing.    Skin: Negative for color change and rash.   Musculoskeletal: Negative for " arthritis, back pain and myalgias.   Gastrointestinal: Positive for diarrhea. Negative for abdominal pain, constipation and nausea.   Neurological: Positive for dizziness. Negative for numbness and paresthesias.   Psychiatric/Behavioral: Negative.      Objective:     Vital Signs (Most Recent):  Temp: 99 °F (37.2 °C) (02/11/18 1246)  Pulse: 108 (02/11/18 1404)  Resp: (!) 21 (02/11/18 1404)  BP: (!) 83/50 (02/11/18 1404)  SpO2: (!) 94 % (02/11/18 1404) Vital Signs (24h Range):  Temp:  [98.7 °F (37.1 °C)-99 °F (37.2 °C)] 99 °F (37.2 °C)  Pulse:  [102-120] 108  Resp:  [6-29] 21  SpO2:  [94 %-96 %] 94 %  BP: (83-99)/(50-56) 83/50     Patient Vitals for the past 72 hrs (Last 3 readings):   Weight   02/11/18 1042 83.9 kg (185 lb)     Body mass index is 28.98 kg/m².    No intake or output data in the 24 hours ending 02/11/18 1412    Physical Exam   Constitutional: He is oriented to person, place, and time. He appears well-developed and well-nourished. No distress.   HENT:   Head: Normocephalic and atraumatic.   Not congested by sniff test    Eyes: Pupils are equal, round, and reactive to light. No scleral icterus.   Neck: Neck supple. JVD present.   Cardiovascular: Regular rhythm.    No murmur heard.  Sinus Tachycardia (low 100s)   Pulmonary/Chest: Effort normal. No respiratory distress.   Decreased breath sounds in RLL. Scant crackles bilaterally    Abdominal: Soft. He exhibits no distension. There is no tenderness.   Musculoskeletal: He exhibits no edema.   Neurological: He is alert and oriented to person, place, and time.   Skin: Skin is warm and dry. Capillary refill takes 2 to 3 seconds.   Psychiatric: He has a normal mood and affect. His behavior is normal. Judgment and thought content normal.   Vitals reviewed.    Significant Labs:  CBC:    Recent Labs  Lab 02/11/18  1113   WBC 6.23   RBC 2.94*   HGB 8.8*   HCT 27.3*      MCV 93   MCH 29.9   MCHC 32.2     BNP:    Recent Labs  Lab 02/11/18  1113   *  "    CMP:    Recent Labs  Lab 02/11/18  1113   *   CALCIUM 9.2   ALBUMIN 3.0*   PROT 6.7      K 4.1   CO2 26   CL 97   BUN 33*   CREATININE 2.6*   ALKPHOS 413*   ALT 20   AST 27   BILITOT 1.8*      Coagulation:     Recent Labs  Lab 02/11/18  1113   INR 1.0   APTT 28.3     Microbiology:  Microbiology Results (last 7 days)     Procedure Component Value Units Date/Time    Clostridium difficile EIA [[id number]]     Order Status:  No result Specimen:  Stool from Stool     Culture, Respiratory with Gram Stain [[id number]]     Order Status:  No result Specimen:  Respiratory     Stool culture [[id number]]     Order Status:  No result Specimen:  Stool from Stool     Blood culture x two cultures. Draw prior to antibiotics. [[id number]] Collected:  02/11/18 1115    Order Status:  Sent Specimen:  Blood from Peripheral, Forearm, Left Updated:  02/11/18 1133    Blood culture x two cultures. Draw prior to antibiotics. [[id number]] Collected:  02/11/18 1100    Order Status:  Sent Specimen:  Blood from Peripheral, Hand, Left Updated:  02/11/18 1132        EKG: Sinus tachycardia (100 BPM). Older inferior/lateral TWIs     I have reviewed all pertinent labs within the past 24 hours.    Diagnostic Results:  CXR: X-ray Chest Pa And Lateral: "Possibly slight decrease in the right pleural effusion, otherwise, grossly stable appearance of the chest radiograph.  No new large focal consolidation."        Assessment/Plan:     * SOB (shortness of breath)    Etiology: PNA, bronchitis, worsening effusion.   Afebrile here with CXR that looks slightly improved from last (although effusion obscures RLL)  Will attempt sputum induction with culture / gram stain   No sick contacts  Flu panel negative  Received Vanc/Cefepime x1 in ED   Will continue cefepime and start azithromycin (atypical coverage) for now   Will ask ID to weigh in given OHTx and immunosuppression but this does not seem like a bacterial process of the lungs (symptoms, vitals, " imaging and hx of effusion with recently pulled pleurex catheter)   Procalcitonin is normal (points away from bacterial infection)   2.5L of NSS ordered by ED. This was stopped and patient only received 500mL           Heart transplanted    S/P OHTx 11/8/14 (high risk)  - history of AMR treated with PP and IVIG 4/2016  - Continue Prograf 3/3, Prednisone 2.5mg daily and Myfortic 720 BID  - Prograf goal 6-8. Will get level in the AM         Diarrhea    Stool studies ordered (WBC, culture, ova/parasite)   CMV, quant, PCR ordered   C diff ordered (has history in past but not recent ABX exposure)   Hyperthyroidism?        Type 1 diabetes mellitus with stage 3 chronic kidney disease    Levemir 15 units QHS   Meal coverage: 3 units of aspart with meals   SSI PRN   Hypoglycemia protocol   Continue gabapentin and nortriptyline for neuropathy   Repeat A1c         Hypothyroidism due to Hashimoto's thyroiditis    TSH low with significantly elevated T4 points towards hyperthyroidism   For now will reduce levothyroxine from 150mcg to 125mcg with expectation endocrinology may change  Will ask Endocrinology to evaluate  No clinical signs / symptoms to point towards thyroid storm         Chronic kidney disease (CKD), stage III (moderate)    Daily BMP         Anxiety    Continue PTA bernarda rhodes, DO  Heart Transplant  [facility]

## 2018-02-11 NOTE — NURSING
Patient admitted to CSU Patient alert and oriented to room. Cardiac monitoring applied. Patient connected to telemetry, assessed, denies any pain, oriented to room, and instructed to call for assistance or any needs. Pt supine, flat on bed.  Call bell in reach. Reviewed goals for today. Will continue to monitor

## 2018-02-11 NOTE — SUBJECTIVE & OBJECTIVE
"Past Medical History:   Diagnosis Date    Arthritis     Ascites     Thought to be 2/2 heart tpx; liver bx 3/31/17 w/o significant fibrosis    Cardiomyopathy     Depression     Controlled "for the most part" on Lexipro    Encounter for blood transfusion     during transplant    GERD (gastroesophageal reflux disease)     Hashimoto's disease     History of CHF (congestive heart failure)     Previously EF 20% (prior to heart transplant); last EF 60%, PAP 41 as of 12/12/17; throught to be 2/2 genetics & DM1    History of coronary artery disease     H/o Coronary artery disease; resolved since heart transplant 2014    History of myocardial infarction     h/o MI x3 prior to heart transplant    HLD (hyperlipidemia) 6/11/2015    Hypoglycemia unawareness in type 1 diabetes mellitus     Hypothyroid     Initially hyperthyroid 2/2 Hoshimoto's thyroiditis; now on levothyroxine 150 mcg qd    Pleural effusion due to another disorder     Thought to be 2/2 heart tpx; s/p PleuRx catheter placement and removal after 1-2 months    Pulmonary hypertension assoc with unclear multi-factorial mechanisms 12/12/2017    PAP 41 (EF 60%) on ECHO 12/12/17    Syncope 6/30/2015    Type I diabetes mellitus, well controlled     Well controlled; Dx'd @5y/o; on N insulin 20U BID & Humalog 10U w/meals +SSI; Glu 89 11/9/17; A1c 7.2% 4/5/17    Unspecified essential hypertension 05/29/2014    Well controlled; Last /57 on 11/9/17       Past Surgical History:   Procedure Laterality Date    CARDIAC CATHETERIZATION      CARDIAC SURGERY      CHOLECYSTECTOMY      CORONARY ANGIOPLASTY      FOOT SPLIT TRANSFER OF THE POSTERIOR TIBIALIS TENDON PROCEDURE      HEART TRANSPLANT  2014    KNEE SURGERY      PleuRx catheter placement  01/11/2018    Plan for removal after 1-2 months by Dr. James in cardiothoracic surgery    s/p oht  November 2014    stent placemnet         Review of patient's allergies indicates:   Allergen Reactions    " "No known drug allergies        No current facility-administered medications on file prior to encounter.      Current Outpatient Prescriptions on File Prior to Encounter   Medication Sig    albuterol 90 mcg/actuation inhaler Inhale 2 puffs into the lungs every 6 (six) hours as needed for Shortness of Breath.    aspirin (ECOTRIN) 81 MG EC tablet Take 1 tablet (81 mg total) by mouth once daily.    escitalopram oxalate (LEXAPRO) 20 MG tablet Take 1 tablet (20 mg total) by mouth once daily. (Patient taking differently: Take 20 mg by mouth every morning. )    fluticasone (FLONASE) 50 mcg/actuation nasal spray 2 sprays by Each Nare route once daily.    gabapentin (NEURONTIN) 300 MG capsule Take 3 capsules (900 mg total) by mouth 3 (three) times daily.    glucagon (human recombinant) inj 1mg/mL kit Inject 1 mL (1 mg total) into the muscle as needed.    insulin NPH (NOVOLIN N) 100 unit/mL injection Inject 20 Units into the skin 2 (two) times daily before meals.    lancets Misc 1 Device by Misc.(Non-Drug; Combo Route) route 4 (four) times daily with meals and nightly.    levothyroxine (SYNTHROID) 150 MCG tablet Take 1 tablet (150 mcg total) by mouth every morning.    magnesium oxide (MAG-OX) 400 mg tablet Take 1 tablet (400 mg total) by mouth once daily.    mycophenolate (MYFORTIC) 180 MG TbEC Take 4 tablets (720 mg total) by mouth 2 (two) times daily. S/P Heart Transplant 11/18/2014 ICD-10 Z94.1    nortriptyline (PAMELOR) 25 MG capsule Take 1 capsule (25 mg total) by mouth every evening.    ondansetron (ZOFRAN-ODT) 4 MG TbDL Take 2 tablets (8 mg total) by mouth every 8 (eight) hours as needed (nausea).    oxyCODONE-acetaminophen (PERCOCET) 5-325 mg per tablet Take 1 tablet by mouth every 4 (four) hours as needed for Pain.    pantoprazole (PROTONIX) 40 MG tablet TAKE ONE TABLET BY MOUTH EVERY DAY    pen needle, diabetic 32 gauge x 5/32" Ndle Uses 5 pen needles a day    pravastatin (PRAVACHOL) 40 MG tablet " Take 1 tablet (40 mg total) by mouth every evening.    predniSONE (DELTASONE) 2.5 MG tablet Take 1 tablet (2.5 mg total) by mouth once daily. S/P Heart Transplant 11/18/2014 ICD-10 Z94.1    tacrolimus (PROGRAF) 1 MG Cap Taked 3mg orally in the morning and 3mg orally in the evening. S/p heart transplant  11/18/2014  ICD-10 Z94.1    tamsulosin (FLOMAX) 0.4 mg Cp24 TAKE 2 CAPSULES(0.8 MG) BY MOUTH EVERY EVENING    torsemide (DEMADEX) 20 MG Tab Take 2 tablets (40 mg total) by mouth once daily.     Family History     Problem Relation (Age of Onset)    Cancer Brother (50)    Diabetes Mother, Father, Brother, Son, Sister, Maternal Uncle, Paternal Aunt, Maternal Grandmother, Maternal Grandfather    Heart disease Mother, Brother    Hypertension Mother, Father, Brother    Kidney disease Mother, Father    Stroke Father, Brother    Vision loss Brother        Social History Main Topics    Smoking status: Never Smoker    Smokeless tobacco: Never Used    Alcohol use No    Drug use: No    Sexual activity: Yes     Partners: Female     Review of Systems   Constitution: Positive for decreased appetite, fever and malaise/fatigue. Negative for chills.   HENT: Negative.  Negative for congestion, ear pain, odynophagia and sore throat.    Cardiovascular: Positive for dyspnea on exertion. Negative for chest pain, irregular heartbeat, leg swelling, orthopnea, palpitations and paroxysmal nocturnal dyspnea.   Respiratory: Positive for cough and shortness of breath. Negative for wheezing.    Skin: Negative for color change and rash.   Musculoskeletal: Negative for arthritis, back pain and myalgias.   Gastrointestinal: Positive for diarrhea. Negative for abdominal pain, constipation and nausea.   Genitourinary: Positive for dysuria and frequency.   Neurological: Positive for dizziness. Negative for numbness and paresthesias.   Psychiatric/Behavioral: Negative.      Objective:     Vital Signs (Most Recent):  Temp: 99 °F (37.2 °C)  (02/11/18 1246)  Pulse: 105 (02/11/18 1246)  Resp: 16 (02/11/18 1246)  BP: (!) 99/52 (02/11/18 1246)  SpO2: 95 % (02/11/18 1246) Vital Signs (24h Range):  Temp:  [98.7 °F (37.1 °C)-99 °F (37.2 °C)] 99 °F (37.2 °C)  Pulse:  [104-120] 105  Resp:  [6-22] 16  SpO2:  [94 %-96 %] 95 %  BP: (87-99)/(51-56) 99/52     Weight: 83.9 kg (185 lb)  Body mass index is 28.98 kg/m².    SpO2: 95 %  O2 Device (Oxygen Therapy): room air    No intake or output data in the 24 hours ending 02/11/18 1251    Lines/Drains/Airways     Drain                 Chest Tube 01/11/18 0820 1 Right Pleural 14 Fr. 31 days                Physical Exam   Constitutional: Vital signs are normal. He appears well-developed and well-nourished. He is cooperative.  Non-toxic appearance. No distress.   HENT:   Head: Normocephalic and atraumatic.   Neck: JVD present. No hepatojugular reflux present. Carotid bruit is not present.   Cardiovascular: Regular rhythm, S1 normal, S2 normal and normal heart sounds.  Tachycardia present.  Exam reveals no gallop.    No murmur heard.  Pulses:       Radial pulses are 2+ on the right side, and 2+ on the left side.        Dorsalis pedis pulses are 2+ on the right side, and 2+ on the left side.        Posterior tibial pulses are 2+ on the right side, and 2+ on the left side.   No lower extremity edema   Pulmonary/Chest: Effort normal. No respiratory distress. He has decreased breath sounds in the right lower field. He has no wheezes. He has no rhonchi. He has no rales.   Abdominal: Soft. Normal appearance and bowel sounds are normal. He exhibits no distension and no mass. There is no tenderness.   Neurological: He is alert.   Skin: Skin is warm, dry and intact.       Significant Labs:   CMP   Recent Labs  Lab 02/11/18  1113      K 4.1   CL 97   CO2 26   *   BUN 33*   CREATININE 2.6*   CALCIUM 9.2   PROT 6.7   ALBUMIN 3.0*   BILITOT 1.8*   ALKPHOS 413*   AST 27   ALT 20   ANIONGAP 13   ESTGFRAFRICA 33.2*   EGFRNONAA  28.7*   , CBC   Recent Labs  Lab 02/11/18  1113   WBC 6.23   HGB 8.8*   HCT 27.3*       and INR   Recent Labs  Lab 02/11/18  1113   INR 1.0       Significant Imaging: Echocardiogram:   2D echo with color flow doppler:   Results for orders placed or performed during the hospital encounter of 12/11/17   2D echo with color flow doppler   Result Value Ref Range    EF 60 55 - 65    Mitral Valve Regurgitation TRIVIAL TO MILD     Diastolic Dysfunction No     Est. PA Systolic Pressure 40.95 (A)     Tricuspid Valve Regurgitation TRIVIAL    EKG: sinus tachycardia with HR at 100. Inferior and anterolateral TWI that have been described previously.  X-Ray: CXR: X-Ray Chest PA and Lateral (CXR):   Results for orders placed or performed during the hospital encounter of 02/11/18   X-Ray Chest PA And Lateral    Narrative    Chest PA and lateral    Indication:Abnormality of breathing    Comparison:2/7/2018    Findings:  The cardiomediastinal silhouette is not enlarged, stable as compared to previous exam.  There is a left pleural effusion, slightly decreased.  The trachea is midline.  The lungs are symmetrically expanded bilaterally with patchy interstitial and parenchymal attenuation projected over the right mid and lower lung zones, likely a combination of atelectasis and/or consolidation related to the effusion, overall the appearance is not significant changed.  No new large focal consolidation.  There is no pneumothorax.  The osseous structures are unchanged.    Impression         Possibly slight decrease in the right pleural effusion, otherwise, grossly stable appearance of the chest radiograph.  No new large focal consolidation..          Electronically signed by: TIFFANIE JUAREZ MD  Date:     02/11/18  Time:    12:44

## 2018-02-11 NOTE — SUBJECTIVE & OBJECTIVE
"Past Medical History:   Diagnosis Date    Arthritis     Ascites     Thought to be 2/2 heart tpx; liver bx 3/31/17 w/o significant fibrosis    Cardiomyopathy     Depression     Controlled "for the most part" on Lexipro    Encounter for blood transfusion     during transplant    GERD (gastroesophageal reflux disease)     Hashimoto's disease     History of CHF (congestive heart failure)     Previously EF 20% (prior to heart transplant); last EF 60%, PAP 41 as of 12/12/17; throught to be 2/2 genetics & DM1    History of coronary artery disease     H/o Coronary artery disease; resolved since heart transplant 2014    History of myocardial infarction     h/o MI x3 prior to heart transplant    HLD (hyperlipidemia) 6/11/2015    Hypoglycemia unawareness in type 1 diabetes mellitus     Hypothyroid     Initially hyperthyroid 2/2 Hoshimoto's thyroiditis; now on levothyroxine 150 mcg qd    Pleural effusion due to another disorder     Thought to be 2/2 heart tpx; s/p PleuRx catheter placement and removal after 1-2 months    Pulmonary hypertension assoc with unclear multi-factorial mechanisms 12/12/2017    PAP 41 (EF 60%) on ECHO 12/12/17    Syncope 6/30/2015    Type I diabetes mellitus, well controlled     Well controlled; Dx'd @7y/o; on N insulin 20U BID & Humalog 10U w/meals +SSI; Glu 89 11/9/17; A1c 7.2% 4/5/17    Unspecified essential hypertension 05/29/2014    Well controlled; Last /57 on 11/9/17     Past Surgical History:   Procedure Laterality Date    CARDIAC CATHETERIZATION      CARDIAC SURGERY      CHOLECYSTECTOMY      CORONARY ANGIOPLASTY      FOOT SPLIT TRANSFER OF THE POSTERIOR TIBIALIS TENDON PROCEDURE      HEART TRANSPLANT  2014    KNEE SURGERY      PleuRx catheter placement  01/11/2018    Plan for removal after 1-2 months by Dr. James in cardiothoracic surgery    s/p oht  November 2014    stent placemnet       Review of patient's allergies indicates:   Allergen Reactions    No " known drug allergies      Current Facility-Administered Medications   Medication    [START ON 2/12/2018] aspirin EC tablet 81 mg    dextrose 50% injection 12.5 g    dextrose 50% injection 25 g    [START ON 2/12/2018] escitalopram oxalate tablet 20 mg    gabapentin capsule 900 mg    glucagon (human recombinant) injection 1 mg    glucose chewable tablet 16 g    glucose chewable tablet 24 g    heparin (porcine) injection 5,000 Units    insulin aspart pen 1-10 Units    insulin aspart pen 3 Units    insulin detemir pen 15 Units    [START ON 2/12/2018] levothyroxine tablet 125 mcg    [START ON 2/12/2018] magnesium oxide tablet 400 mg    mycophenolate EC tablet 720 mg    nortriptyline capsule 25 mg    ondansetron disintegrating tablet 8 mg    [START ON 2/12/2018] pantoprazole EC tablet 40 mg    [START ON 2/12/2018] pravastatin tablet 40 mg    [START ON 2/12/2018] predniSONE tablet 2.5 mg    sodium chloride 0.9% flush 3 mL    tacrolimus capsule 3 mg    tamsulosin 24 hr capsule 0.8 mg    [START ON 2/12/2018] torsemide tablet 40 mg     Current Outpatient Prescriptions   Medication Sig    aspirin (ECOTRIN) 81 MG EC tablet Take 1 tablet (81 mg total) by mouth once daily.    escitalopram oxalate (LEXAPRO) 20 MG tablet Take 1 tablet (20 mg total) by mouth once daily. (Patient taking differently: Take 20 mg by mouth every morning. )    gabapentin (NEURONTIN) 300 MG capsule Take 3 capsules (900 mg total) by mouth 3 (three) times daily.    insulin NPH (NOVOLIN N) 100 unit/mL injection Inject 20 Units into the skin 2 (two) times daily before meals.    lancets Misc 1 Device by Misc.(Non-Drug; Combo Route) route 4 (four) times daily with meals and nightly.    levothyroxine (SYNTHROID) 150 MCG tablet Take 1 tablet (150 mcg total) by mouth every morning.    magnesium oxide (MAG-OX) 400 mg tablet Take 1 tablet (400 mg total) by mouth once daily.    mycophenolate (MYFORTIC) 180 MG TbEC Take 4 tablets (720 mg  "total) by mouth 2 (two) times daily. S/P Heart Transplant 11/18/2014 ICD-10 Z94.1    nortriptyline (PAMELOR) 25 MG capsule Take 1 capsule (25 mg total) by mouth every evening.    ondansetron (ZOFRAN-ODT) 4 MG TbDL Take 2 tablets (8 mg total) by mouth every 8 (eight) hours as needed (nausea).    pantoprazole (PROTONIX) 40 MG tablet TAKE ONE TABLET BY MOUTH EVERY DAY    pen needle, diabetic 32 gauge x 5/32" Ndle Uses 5 pen needles a day    pravastatin (PRAVACHOL) 40 MG tablet Take 1 tablet (40 mg total) by mouth every evening. (Patient taking differently: Take 40 mg by mouth once daily. )    predniSONE (DELTASONE) 2.5 MG tablet Take 1 tablet (2.5 mg total) by mouth once daily. S/P Heart Transplant 11/18/2014 ICD-10 Z94.1    tacrolimus (PROGRAF) 1 MG Cap Taked 3mg orally in the morning and 3mg orally in the evening. S/p heart transplant  11/18/2014  ICD-10 Z94.1    tamsulosin (FLOMAX) 0.4 mg Cp24 TAKE 2 CAPSULES(0.8 MG) BY MOUTH EVERY EVENING    torsemide (DEMADEX) 20 MG Tab Take 2 tablets (40 mg total) by mouth once daily.    glucagon (human recombinant) inj 1mg/mL kit Inject 1 mL (1 mg total) into the muscle as needed.    oxyCODONE-acetaminophen (PERCOCET) 5-325 mg per tablet Take 1 tablet by mouth every 4 (four) hours as needed for Pain.     Family History     Problem Relation (Age of Onset)    Cancer Brother (50)    Diabetes Mother, Father, Brother, Son, Sister, Maternal Uncle, Paternal Aunt, Maternal Grandmother, Maternal Grandfather    Heart disease Mother, Brother    Hypertension Mother, Father, Brother    Kidney disease Mother, Father    Stroke Father, Brother    Vision loss Brother        Social History Main Topics    Smoking status: Never Smoker    Smokeless tobacco: Never Used    Alcohol use No    Drug use: No    Sexual activity: Yes     Partners: Female     Review of Systems   Constitution: Positive for decreased appetite, fever and malaise/fatigue. Negative for chills.   HENT: Negative.  " Negative for congestion, ear pain, odynophagia and sore throat.    Cardiovascular: Positive for dyspnea on exertion. Negative for chest pain, irregular heartbeat, leg swelling, orthopnea, palpitations and paroxysmal nocturnal dyspnea.   Respiratory: Positive for dry cough and shortness of breath. Negative for wheezing.    Skin: Negative for color change and rash.   Musculoskeletal: Negative for arthritis, back pain and myalgias.   Gastrointestinal: Positive for diarrhea. Negative for abdominal pain, constipation and nausea.   Neurological: Positive for dizziness. Negative for numbness and paresthesias.   Psychiatric/Behavioral: Negative.      Objective:     Vital Signs (Most Recent):  Temp: 99 °F (37.2 °C) (02/11/18 1246)  Pulse: 108 (02/11/18 1404)  Resp: (!) 21 (02/11/18 1404)  BP: (!) 83/50 (02/11/18 1404)  SpO2: (!) 94 % (02/11/18 1404) Vital Signs (24h Range):  Temp:  [98.7 °F (37.1 °C)-99 °F (37.2 °C)] 99 °F (37.2 °C)  Pulse:  [102-120] 108  Resp:  [6-29] 21  SpO2:  [94 %-96 %] 94 %  BP: (83-99)/(50-56) 83/50     Patient Vitals for the past 72 hrs (Last 3 readings):   Weight   02/11/18 1042 83.9 kg (185 lb)     Body mass index is 28.98 kg/m².    No intake or output data in the 24 hours ending 02/11/18 1412    Physical Exam   Constitutional: He is oriented to person, place, and time. He appears well-developed and well-nourished. No distress.   HENT:   Head: Normocephalic and atraumatic.   Not congested by sniff test    Eyes: Pupils are equal, round, and reactive to light. No scleral icterus.   Neck: Neck supple. JVD present.   Cardiovascular: Regular rhythm.    No murmur heard.  Sinus Tachycardia (low 100s)   Pulmonary/Chest: Effort normal. No respiratory distress.   Decreased breath sounds in RLL. Scant crackles bilaterally    Abdominal: Soft. He exhibits no distension. There is no tenderness.   Musculoskeletal: He exhibits no edema.   Neurological: He is alert and oriented to person, place, and time.   Skin:  "Skin is warm and dry. Capillary refill takes 2 to 3 seconds.   Psychiatric: He has a normal mood and affect. His behavior is normal. Judgment and thought content normal.   Vitals reviewed.    Significant Labs:  CBC:    Recent Labs  Lab 02/11/18  1113   WBC 6.23   RBC 2.94*   HGB 8.8*   HCT 27.3*      MCV 93   MCH 29.9   MCHC 32.2     BNP:    Recent Labs  Lab 02/11/18  1113   *     CMP:    Recent Labs  Lab 02/11/18  1113   *   CALCIUM 9.2   ALBUMIN 3.0*   PROT 6.7      K 4.1   CO2 26   CL 97   BUN 33*   CREATININE 2.6*   ALKPHOS 413*   ALT 20   AST 27   BILITOT 1.8*      Coagulation:     Recent Labs  Lab 02/11/18 1113   INR 1.0   APTT 28.3     Microbiology:  Microbiology Results (last 7 days)     Procedure Component Value Units Date/Time    Clostridium difficile EIA [[id number]]     Order Status:  No result Specimen:  Stool from Stool     Culture, Respiratory with Gram Stain [[id number]]     Order Status:  No result Specimen:  Respiratory     Stool culture [[id number]]     Order Status:  No result Specimen:  Stool from Stool     Blood culture x two cultures. Draw prior to antibiotics. [[id number]] Collected:  02/11/18 1115    Order Status:  Sent Specimen:  Blood from Peripheral, Forearm, Left Updated:  02/11/18 1133    Blood culture x two cultures. Draw prior to antibiotics. [[id number]] Collected:  02/11/18 1100    Order Status:  Sent Specimen:  Blood from Peripheral, Hand, Left Updated:  02/11/18 1132        EKG: Sinus tachycardia (100 BPM). Older inferior/lateral TWIs     I have reviewed all pertinent labs within the past 24 hours.    Diagnostic Results:  CXR: X-ray Chest Pa And Lateral: "Possibly slight decrease in the right pleural effusion, otherwise, grossly stable appearance of the chest radiograph.  No new large focal consolidation."      "

## 2018-02-11 NOTE — ED PROVIDER NOTES
"Encounter Date: 2/11/2018    SCRIBE #1 NOTE: I, Aisha Sanchez, am scribing for, and in the presence of,  Dr. Augustine. I have scribed the following portions of the note - the APC attestation and the EKG reading.       History     Chief Complaint   Patient presents with    Fever     low grade fever x last few days with diarrhea and "possible fluid in his lungs". Heart Txp 11/14. wife states pt "passed out" this am while coughing. had tube in lung that was recently removed     The patient is a 42 y.o. male with hx of: DM, Hashimoto's disease, GERD, arthritis, HTN, HLD, CHF, CAD, depression, and heart transplant  that presents to the ED with a complaint of pleuritic pain, slightly increased AGUILAR, and intermittent dry cough, fever, nausea diarrhea.  Pt states he had a Pleurx placed on 1/5/18. Pleurx was initially draining 700-800mL then decreased to 350ml every 2 days until clogged. Pleurx removed 2/7/18. Last paracentesis about 10 days ago. Denies chills, vomiting or changes in bowel and bladder functioning.             Review of patient's allergies indicates:   Allergen Reactions    No known drug allergies      Past Medical History:   Diagnosis Date    Arthritis     Ascites     Thought to be 2/2 heart tpx; liver bx 3/31/17 w/o significant fibrosis    Cardiomyopathy     Depression     Controlled "for the most part" on Lexipro    Encounter for blood transfusion     during transplant    GERD (gastroesophageal reflux disease)     Hashimoto's disease     History of CHF (congestive heart failure)     Previously EF 20% (prior to heart transplant); last EF 60%, PAP 41 as of 12/12/17; throught to be 2/2 genetics & DM1    History of coronary artery disease     H/o Coronary artery disease; resolved since heart transplant 2014    History of myocardial infarction     h/o MI x3 prior to heart transplant    HLD (hyperlipidemia) 6/11/2015    Hypoglycemia unawareness in type 1 diabetes mellitus     Hypothyroid     Initially " hyperthyroid 2/2 Hoshimoto's thyroiditis; now on levothyroxine 150 mcg qd    Pleural effusion due to another disorder     Thought to be 2/2 heart tpx; s/p PleuRx catheter placement and removal after 1-2 months    Pulmonary hypertension assoc with unclear multi-factorial mechanisms 12/12/2017    PAP 41 (EF 60%) on ECHO 12/12/17    Syncope 6/30/2015    Type I diabetes mellitus, well controlled     Well controlled; Dx'd @7y/o; on N insulin 20U BID & Humalog 10U w/meals +SSI; Glu 89 11/9/17; A1c 7.2% 4/5/17    Unspecified essential hypertension 05/29/2014    Well controlled; Last /57 on 11/9/17     Past Surgical History:   Procedure Laterality Date    CARDIAC CATHETERIZATION      CARDIAC SURGERY      CHOLECYSTECTOMY      CORONARY ANGIOPLASTY      FOOT SPLIT TRANSFER OF THE POSTERIOR TIBIALIS TENDON PROCEDURE      HEART TRANSPLANT  2014    KNEE SURGERY      PleuRx catheter placement  01/11/2018    Plan for removal after 1-2 months by Dr. James in cardiothoracic surgery    s/p oht  November 2014    stent placemnet       Family History   Problem Relation Age of Onset    Heart disease Mother     Kidney disease Mother     Diabetes Mother     Hypertension Mother     Kidney disease Father     Diabetes Father     Hypertension Father     Stroke Father     Heart disease Brother     Stroke Brother     Diabetes Brother     Cancer Brother 50     pancreatic    Vision loss Brother     Hypertension Brother     Diabetes Son     Diabetes Sister     Diabetes Maternal Uncle     Diabetes Paternal Aunt     Diabetes Maternal Grandmother     Diabetes Maternal Grandfather      Social History   Substance Use Topics    Smoking status: Never Smoker    Smokeless tobacco: Never Used    Alcohol use No     Review of Systems   Constitutional: Positive for activity change, fatigue and fever. Negative for appetite change and chills.   HENT: Negative for congestion, ear discharge, sinus pain, sinus pressure,  sneezing and trouble swallowing.    Eyes: Negative for photophobia and discharge.   Respiratory: Positive for cough, shortness of breath and wheezing. Negative for apnea and chest tightness.    Cardiovascular: Positive for chest pain. Negative for palpitations and leg swelling.   Gastrointestinal: Positive for abdominal distention, abdominal pain, diarrhea and nausea. Negative for constipation and vomiting.        Describes pain as tightness   Endocrine: Negative.    Genitourinary: Negative for difficulty urinating, flank pain, hematuria and urgency.   Musculoskeletal: Positive for arthralgias and myalgias. Negative for back pain, gait problem, joint swelling, neck pain and neck stiffness.   Skin: Positive for pallor and wound. Negative for rash.   Allergic/Immunologic: Negative.    Neurological: Positive for syncope and weakness. Negative for dizziness, numbness and headaches.   Psychiatric/Behavioral: Negative.        Physical Exam     Initial Vitals [02/11/18 1042]   BP Pulse Resp Temp SpO2   (!) 87/52 (!) 120 (!) 22 98.7 °F (37.1 °C) 96 %      MAP       63.67         Physical Exam    Nursing note and vitals reviewed.  Constitutional: He appears well-developed and well-nourished. He is not diaphoretic. He is cooperative. He has a sickly appearance. He appears ill. No distress.   HENT:   Head: Normocephalic and atraumatic.   Nose: Nose normal.   Mouth/Throat: Uvula is midline. Mucous membranes are pale and dry. Posterior oropharyngeal edema and posterior oropharyngeal erythema present.   Eyes: Conjunctivae, EOM and lids are normal. Pupils are equal, round, and reactive to light.   Neck: Trachea normal, normal range of motion and full passive range of motion without pain. Neck supple. No JVD present.   Cardiovascular: Regular rhythm, normal heart sounds and intact distal pulses. Tachycardia present.    Pulses:       Radial pulses are 2+ on the right side, and 2+ on the left side.        Dorsalis pedis pulses are 2+  on the right side, and 2+ on the left side.   Pulmonary/Chest: Effort normal. No stridor. He has decreased breath sounds. He has wheezes in the right upper field, the right middle field, the right lower field, the left upper field, the left middle field and the left lower field. He exhibits tenderness.   Abdominal: Soft. Normal appearance and bowel sounds are normal. He exhibits distension, fluid wave and ascites. There is no tenderness. There is no rigidity, no rebound, no guarding and no CVA tenderness.   Musculoskeletal: Normal range of motion. He exhibits tenderness.   Neurological: He is alert and oriented to person, place, and time. He has normal strength. No sensory deficit. GCS eye subscore is 4. GCS verbal subscore is 5. GCS motor subscore is 6.   Skin: Skin is warm and dry. Capillary refill takes more than 3 seconds. Abrasion noted.   Right chest wall wound from PleurX noted. Dressing CDI  Abrasion noted to right chest wall     Psychiatric: He has a normal mood and affect. His speech is normal and behavior is normal. Judgment and thought content normal. Cognition and memory are normal.         ED Course   Critical Care  Date/Time: 2/11/2018 10:50 AM  Performed by: BALBINA CIFUENTES  Authorized by: ISRA ARNETT   Total critical care time (exclusive of procedural time) : 0 minutes  Critical care was necessary to treat or prevent imminent or life-threatening deterioration of the following conditions: sepsis.  Critical care was time spent personally by me on the following activities: discussions with consultants, evaluation of patient's response to treatment, development of treatment plan with patient or surrogate, examination of patient, ordering and performing treatments and interventions, ordering and review of radiographic studies, re-evaluation of patient's condition, pulse oximetry, ordering and review of laboratory studies, obtaining history from patient or surrogate and review of old  charts.        Labs Reviewed   CBC W/ AUTO DIFFERENTIAL - Abnormal; Notable for the following:        Result Value    RBC 2.94 (*)     Hemoglobin 8.8 (*)     Hematocrit 27.3 (*)     Immature Granulocytes 1.8 (*)     Immature Grans (Abs) 0.11 (*)     Lymph # 0.8 (*)     Lymph% 12.5 (*)     All other components within normal limits   CULTURE, BLOOD   CULTURE, BLOOD   APTT   PROTIME-INR   B-TYPE NATRIURETIC PEPTIDE   COMPREHENSIVE METABOLIC PANEL   CORTISOL, RANDOM   LACTIC ACID, PLASMA   LIPASE   MAGNESIUM   PHOSPHORUS   PROCALCITONIN   TROPONIN I   TSH   URINALYSIS, REFLEX TO URINE CULTURE   INFLUENZA A AND B ANTIGEN     EKG Readings: (Independently Interpreted)   Sinus tachycardia. Normal axis. T-wave inversions to inferior, septal, and lateral leads at 106 BPM.          Medical Decision Making:   History:   Old Medical Records: I decided to obtain old medical records.  Initial Assessment:   The patient is a 42 y.o. male with hx of: DM, Hashimoto's disease, GERD, arthritis, HTN, HLD, CHF, CAD, depression, and heart transplant  that presents to the ED with a complaint of pleuritic pain, slightly increased AGUILAR, and intermittent dry cough, fever, nausea diarrhea. Pt states diarrhea x 2 days.  Pt denies chills, vomiting or changes in bowel and bladder functioning.   Differential Diagnosis:   Septic shock, pneumonia, empyema, CHF exacerbation    Independently Interpreted Test(s):   I have ordered and independently interpreted X-rays - see prior notes.  I have ordered and independently interpreted EKG Reading(s) - see prior notes  Clinical Tests:   Lab Tests: Ordered and Reviewed  The following lab test(s) were unremarkable: CBC, CMP, Lactate, PT, PTT, Troponin, BNP and Urinalysis  Radiological Study: Ordered and Reviewed  Medical Tests: Ordered and Reviewed  ED Management:  Sepsis protocol initiated  Vancomycin IV ordered  Cefipime IV ordered  1130- Cardiology consulted.  Will see patient.    1350- Pt to be admitted to  cardiology.  Pt stable for admission.    Additional MDM:   Sepsis - SIRS Criteria: Temperature: Temp Normal. Heart Rate: HR > 90. Tachypnea: RR > 20. .  Sepsis: Airway: The patient's airway was good (normal gag reflex and breathing). The lactic acid was: normal (<3.0). Antibiotic selection is designed to cover the patient at risk for MRSA and pseudomonas. The patient was treated with Cefepime 2 g IVPB and Vancomycin 20 mg/kg IVPB. The patient was hypotensive and required fluid resuscitation. The patient was treated with the following IV Fluids to maximize the BP and the patient's CVP: NS bolus (30 mL/kg). Consultants: Internal Medicine. stable     APC / Resident Notes:   I discussed the care of this patient with my supervising MD.         Scribe Attestation:   Scribe #1: I performed the above scribed service and the documentation accurately describes the services I performed. I attest to the accuracy of the note.    Attending Attestation:     Physician Attestation Statement for NP/PA:   I discussed this assessment and plan of this patient with the NP/PA, but I did not personally examine the patient. The face to face encounter was performed by the NP/PA.    Other NP/PA Attestation Additions:    History of Present Illness: 42 y.o. Male with comorbidities of heart transplant 2014 who presents with subjective fever and cough.                   ED Course      Clinical Impression:   The encounter diagnosis was Decreased breath sounds.    Disposition:   Disposition: Admitted  Condition: Stable                        Mariia Bettencourt NP  02/11/18 1420

## 2018-02-11 NOTE — ASSESSMENT & PLAN NOTE
--admit to HTS  --low grade fever with possible etiologies: colitis, UTI, pneumonia  --broad spectrum antibiotics for now  --culture for C-diff, blood cultures, resp cultures, flu  --500cc limit to fluid bolus in ED.

## 2018-02-11 NOTE — ASSESSMENT & PLAN NOTE
Stool studies ordered (WBC, culture, ova/parasite)   CMV, quant, PCR ordered   C diff ordered (has history in past but not recent ABX exposure)   Hyperthyroidism?

## 2018-02-11 NOTE — ASSESSMENT & PLAN NOTE
Etiology: PNA, bronchitis, worsening effusion.   Afebrile here with CXR that looks slightly improved from last (although effusion obscures RLL)  Will attempt sputum induction with culture / gram stain   No sick contacts  Flu panel negative  Received Vanc/Cefepime x1 in ED   Will continue cefepime and start azithromycin (atypical coverage) for now   Will ask ID to weigh in given OHTx and immunosuppression but this does not seem like a bacterial process of the lungs (symptoms, vitals, imaging and hx of effusion with recently pulled pleurex catheter)   Procalcitonin is normal (points away from bacterial infection)   2.5L of NSS ordered by ED. This was stopped and patient only received 500mL

## 2018-02-11 NOTE — ASSESSMENT & PLAN NOTE
TSH low with significantly elevated T4 points towards hyperthyroidism   For now will reduce levothyroxine from 150mcg to 125mcg with expectation endocrinology may change  Will ask Endocrinology to evaluate  No clinical signs / symptoms to point towards thyroid storm

## 2018-02-11 NOTE — HPI
""Lv Crocker is a 41 yo WM s/p OHTX 11/14, complicated by AMR, treated 04/15 with 5 days PP, IVIG x 2 doses, was scheduled for Rituximab on 5/1/15 but developed pancytopenia in April 2015 who comes in for a clinic visit. Angiogram 2/16 with IVUS showed no evidence of CAV. 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 He was getting monthly paracentesis and thoracentesis until he underwent a VATS with pleurX catheter placement on 1/11/2018. He had been draining 700-800cc of fluid every 2 days until about 3 weeks ago, where the drainage had decreased. He went to see Dr. James in clinic on Wednesday and it was decided to remove the catheter. Since the removal, he reports not feeling well and having low grade fever no higher than 100.2. He has noted hourly watery diarrhea that is reminiscent of when he had C-diff, but does not have the same odor as then. He reported some dysuria yesterday. No vomiting. He has noted increasing dry cough and shortness of breath similar to when he was last diagnosed with pneumonia. He reports no vomiting, and no nasal congestion or sore throat. He has decreased appetite."   "

## 2018-02-11 NOTE — ASSESSMENT & PLAN NOTE
Levemir 15 units QHS   Meal coverage: 3 units of aspart with meals   SSI PRN   Hypoglycemia protocol   Continue gabapentin and nortriptyline for neuropathy   Repeat A1c

## 2018-02-11 NOTE — CONSULTS
[facility]  Cardiology  Consult Note    Patient Name: Lv Crocker  MRN: [id number]  Admission Date: 2/11/2018  Hospital Length of Stay: 0 days  Code Status: Prior   Attending Provider: Ry Augustine MD   Consulting Provider: Kin Rojas MD  Primary Care Physician: Philippe Mohr MD  Principal Problem:<principal problem not specified>    Patient information was obtained from patient, past medical records and ER records.     Inpatient consult to Cardiology  Consult performed by: KIN ROJAS  Consult ordered by: BALBINA CIFUENTES  Reason for consult: fever        Subjective:     Chief Complaint:  fever     HPI:   Lv Crocker is a 43 yo WM s/p OHTX 11/14, complicated by AMR, treated 04/15 with 5 days PP, IVIG x 2 doses, was scheduled for Rituximab on 5/1/15 but developed pancytopenia in April 2015 who comes in for a clinic visit. Angiogram 2/16 with IVUS showed no evidence of CAV. 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 He was getting monthly paracentesis and thoracentesis until he underwent a VATS with pleurX catheter placement on 1/11/2018. He had been draining 700-800cc of fluid every 2 days until about 3 weeks ago, where the drainage had decreased. He went to see Dr. James in clinic on Wednesday and it was decided to remove the catheter. Since the removal, he reports not feeling well and having low grade fever no higher than 100.2. He has noted hourly watery diarrhea that is reminiscent of when he had C-diff, but does not have the same odor as then. He reported some dysuria yesterday. No vomiting. He has noted increasing dry cough and shortness of breath similar to when he was last diagnosed with pneumonia. He reports no vomiting, and no nasal congestion or sore throat. He has decreased appetite.    Past Medical History:   Diagnosis Date    Arthritis     Ascites      "Thought to be 2/2 heart tpx; liver bx 3/31/17 w/o significant fibrosis    Cardiomyopathy     Depression     Controlled "for the most part" on Lexipro    Encounter for blood transfusion     during transplant    GERD (gastroesophageal reflux disease)     Hashimoto's disease     History of CHF (congestive heart failure)     Previously EF 20% (prior to heart transplant); last EF 60%, PAP 41 as of 12/12/17; throught to be 2/2 genetics & DM1    History of coronary artery disease     H/o Coronary artery disease; resolved since heart transplant 2014    History of myocardial infarction     h/o MI x3 prior to heart transplant    HLD (hyperlipidemia) 6/11/2015    Hypoglycemia unawareness in type 1 diabetes mellitus     Hypothyroid     Initially hyperthyroid 2/2 Hoshimoto's thyroiditis; now on levothyroxine 150 mcg qd    Pleural effusion due to another disorder     Thought to be 2/2 heart tpx; s/p PleuRx catheter placement and removal after 1-2 months    Pulmonary hypertension assoc with unclear multi-factorial mechanisms 12/12/2017    PAP 41 (EF 60%) on ECHO 12/12/17    Syncope 6/30/2015    Type I diabetes mellitus, well controlled     Well controlled; Dx'd @5y/o; on N insulin 20U BID & Humalog 10U w/meals +SSI; Glu 89 11/9/17; A1c 7.2% 4/5/17    Unspecified essential hypertension 05/29/2014    Well controlled; Last /57 on 11/9/17       Past Surgical History:   Procedure Laterality Date    CARDIAC CATHETERIZATION      CARDIAC SURGERY      CHOLECYSTECTOMY      CORONARY ANGIOPLASTY      FOOT SPLIT TRANSFER OF THE POSTERIOR TIBIALIS TENDON PROCEDURE      HEART TRANSPLANT  2014    KNEE SURGERY      PleuRx catheter placement  01/11/2018    Plan for removal after 1-2 months by Dr. James in cardiothoracic surgery    s/p oht  November 2014    stent placemnet         Review of patient's allergies indicates:   Allergen Reactions    No known drug allergies        No current facility-administered " "medications on file prior to encounter.      Current Outpatient Prescriptions on File Prior to Encounter   Medication Sig    albuterol 90 mcg/actuation inhaler Inhale 2 puffs into the lungs every 6 (six) hours as needed for Shortness of Breath.    aspirin (ECOTRIN) 81 MG EC tablet Take 1 tablet (81 mg total) by mouth once daily.    escitalopram oxalate (LEXAPRO) 20 MG tablet Take 1 tablet (20 mg total) by mouth once daily. (Patient taking differently: Take 20 mg by mouth every morning. )    fluticasone (FLONASE) 50 mcg/actuation nasal spray 2 sprays by Each Nare route once daily.    gabapentin (NEURONTIN) 300 MG capsule Take 3 capsules (900 mg total) by mouth 3 (three) times daily.    glucagon (human recombinant) inj 1mg/mL kit Inject 1 mL (1 mg total) into the muscle as needed.    insulin NPH (NOVOLIN N) 100 unit/mL injection Inject 20 Units into the skin 2 (two) times daily before meals.    lancets Misc 1 Device by Misc.(Non-Drug; Combo Route) route 4 (four) times daily with meals and nightly.    levothyroxine (SYNTHROID) 150 MCG tablet Take 1 tablet (150 mcg total) by mouth every morning.    magnesium oxide (MAG-OX) 400 mg tablet Take 1 tablet (400 mg total) by mouth once daily.    mycophenolate (MYFORTIC) 180 MG TbEC Take 4 tablets (720 mg total) by mouth 2 (two) times daily. S/P Heart Transplant 11/18/2014 ICD-10 Z94.1    nortriptyline (PAMELOR) 25 MG capsule Take 1 capsule (25 mg total) by mouth every evening.    ondansetron (ZOFRAN-ODT) 4 MG TbDL Take 2 tablets (8 mg total) by mouth every 8 (eight) hours as needed (nausea).    oxyCODONE-acetaminophen (PERCOCET) 5-325 mg per tablet Take 1 tablet by mouth every 4 (four) hours as needed for Pain.    pantoprazole (PROTONIX) 40 MG tablet TAKE ONE TABLET BY MOUTH EVERY DAY    pen needle, diabetic 32 gauge x 5/32" Ndle Uses 5 pen needles a day    pravastatin (PRAVACHOL) 40 MG tablet Take 1 tablet (40 mg total) by mouth every evening.    predniSONE " (DELTASONE) 2.5 MG tablet Take 1 tablet (2.5 mg total) by mouth once daily. S/P Heart Transplant 11/18/2014 ICD-10 Z94.1    tacrolimus (PROGRAF) 1 MG Cap Taked 3mg orally in the morning and 3mg orally in the evening. S/p heart transplant  11/18/2014  ICD-10 Z94.1    tamsulosin (FLOMAX) 0.4 mg Cp24 TAKE 2 CAPSULES(0.8 MG) BY MOUTH EVERY EVENING    torsemide (DEMADEX) 20 MG Tab Take 2 tablets (40 mg total) by mouth once daily.     Family History     Problem Relation (Age of Onset)    Cancer Brother (50)    Diabetes Mother, Father, Brother, Son, Sister, Maternal Uncle, Paternal Aunt, Maternal Grandmother, Maternal Grandfather    Heart disease Mother, Brother    Hypertension Mother, Father, Brother    Kidney disease Mother, Father    Stroke Father, Brother    Vision loss Brother        Social History Main Topics    Smoking status: Never Smoker    Smokeless tobacco: Never Used    Alcohol use No    Drug use: No    Sexual activity: Yes     Partners: Female     Review of Systems   Constitution: Positive for decreased appetite, fever and malaise/fatigue. Negative for chills.   HENT: Negative.  Negative for congestion, ear pain, odynophagia and sore throat.    Cardiovascular: Positive for dyspnea on exertion. Negative for chest pain, irregular heartbeat, leg swelling, orthopnea, palpitations and paroxysmal nocturnal dyspnea.   Respiratory: Positive for cough and shortness of breath. Negative for wheezing.    Skin: Negative for color change and rash.   Musculoskeletal: Negative for arthritis, back pain and myalgias.   Gastrointestinal: Positive for diarrhea. Negative for abdominal pain, constipation and nausea.   Genitourinary: Positive for dysuria and frequency.   Neurological: Positive for dizziness. Negative for numbness and paresthesias.   Psychiatric/Behavioral: Negative.      Objective:     Vital Signs (Most Recent):  Temp: 99 °F (37.2 °C) (02/11/18 1246)  Pulse: 105 (02/11/18 1246)  Resp: 16 (02/11/18 1246)  BP:  (!) 99/52 (02/11/18 1246)  SpO2: 95 % (02/11/18 1246) Vital Signs (24h Range):  Temp:  [98.7 °F (37.1 °C)-99 °F (37.2 °C)] 99 °F (37.2 °C)  Pulse:  [104-120] 105  Resp:  [6-22] 16  SpO2:  [94 %-96 %] 95 %  BP: (87-99)/(51-56) 99/52     Weight: 83.9 kg (185 lb)  Body mass index is 28.98 kg/m².    SpO2: 95 %  O2 Device (Oxygen Therapy): room air    No intake or output data in the 24 hours ending 02/11/18 1251    Lines/Drains/Airways     Drain                 Chest Tube 01/11/18 0820 1 Right Pleural 14 Fr. 31 days                Physical Exam   Constitutional: Vital signs are normal. He appears well-developed and well-nourished. He is cooperative.  Non-toxic appearance. No distress.   HENT:   Head: Normocephalic and atraumatic.   Neck: JVD present. No hepatojugular reflux present. Carotid bruit is not present.   Cardiovascular: Regular rhythm, S1 normal, S2 normal and normal heart sounds.  Tachycardia present.  Exam reveals no gallop.    No murmur heard.  Pulses:       Radial pulses are 2+ on the right side, and 2+ on the left side.        Dorsalis pedis pulses are 2+ on the right side, and 2+ on the left side.        Posterior tibial pulses are 2+ on the right side, and 2+ on the left side.   No lower extremity edema   Pulmonary/Chest: Effort normal. No respiratory distress. He has decreased breath sounds in the right lower field. He has no wheezes. He has no rhonchi. He has no rales.   Abdominal: Soft. Normal appearance and bowel sounds are normal. He exhibits no distension and no mass. There is no tenderness.   Neurological: He is alert.   Skin: Skin is warm, dry and intact.       Significant Labs:   CMP   Recent Labs  Lab 02/11/18  1113      K 4.1   CL 97   CO2 26   *   BUN 33*   CREATININE 2.6*   CALCIUM 9.2   PROT 6.7   ALBUMIN 3.0*   BILITOT 1.8*   ALKPHOS 413*   AST 27   ALT 20   ANIONGAP 13   ESTGFRAFRICA 33.2*   EGFRNONAA 28.7*   , CBC   Recent Labs  Lab 02/11/18  1113   WBC 6.23   HGB 8.8*   HCT  27.3*       and INR   Recent Labs  Lab 02/11/18  1113   INR 1.0       Significant Imaging: Echocardiogram:   2D echo with color flow doppler:   Results for orders placed or performed during the hospital encounter of 12/11/17   2D echo with color flow doppler   Result Value Ref Range    EF 60 55 - 65    Mitral Valve Regurgitation TRIVIAL TO MILD     Diastolic Dysfunction No     Est. PA Systolic Pressure 40.95 (A)     Tricuspid Valve Regurgitation TRIVIAL    EKG: sinus tachycardia with HR at 100. Inferior and anterolateral TWI that have been described previously.  X-Ray: CXR: X-Ray Chest PA and Lateral (CXR):   Results for orders placed or performed during the hospital encounter of 02/11/18   X-Ray Chest PA And Lateral    Narrative    Chest PA and lateral    Indication:Abnormality of breathing    Comparison:2/7/2018    Findings:  The cardiomediastinal silhouette is not enlarged, stable as compared to previous exam.  There is a left pleural effusion, slightly decreased.  The trachea is midline.  The lungs are symmetrically expanded bilaterally with patchy interstitial and parenchymal attenuation projected over the right mid and lower lung zones, likely a combination of atelectasis and/or consolidation related to the effusion, overall the appearance is not significant changed.  No new large focal consolidation.  There is no pneumothorax.  The osseous structures are unchanged.    Impression         Possibly slight decrease in the right pleural effusion, otherwise, grossly stable appearance of the chest radiograph.  No new large focal consolidation..          Electronically signed by: TIFFANIE JUAREZ MD  Date:     02/11/18  Time:    12:44      Assessment and Plan:     Fever    --admit to [facility]  --low grade fever with possible etiologies: colitis, UTI, pneumonia  --broad spectrum antibiotics for now  --culture for C-diff, blood cultures, resp cultures, flu  --500cc limit to fluid bolus in ED.              VTE Risk  Mitigation     None          Thank you for your consult. I will follow-up with patient. Please contact us if you have any additional questions.    Cecy Cooley MD  Cardiology   [facility]

## 2018-02-12 NOTE — PLAN OF CARE
Problem: Patient Care Overview  Goal: Plan of Care Review  Plan of care discussed with patient. Patient is free of fall/trauma/injury. Denies CP, SOB, or pain/discomfort. Stool culture to be sent off. Cdiff negative. All questions addressed. Will continue to monitor

## 2018-02-12 NOTE — SUBJECTIVE & OBJECTIVE
Interval History: Pt reported feeling better. Frequency of bowel movements decreasing, while consistency of the stool the same watery.     Review of Systems   Constitution: Negative.   HENT: Negative.    Cardiovascular: Negative.    Respiratory: Negative.    Musculoskeletal: Negative.    Gastrointestinal: Positive for diarrhea.   Genitourinary: Negative.    Neurological: Negative.    Psychiatric/Behavioral: Negative.      Objective:     Vital Signs (Most Recent):  Temp: 98.8 °F (37.1 °C) (02/12/18 1206)  Pulse: 102 (02/12/18 1500)  Resp: 18 (02/12/18 1206)  BP: (!) 96/54 (02/12/18 1206)  SpO2: (!) 93 % (02/12/18 1206) Vital Signs (24h Range):  Temp:  [98.2 °F (36.8 °C)-99.1 °F (37.3 °C)] 98.8 °F (37.1 °C)  Pulse:  [] 102  Resp:  [16-19] 18  SpO2:  [92 %-97 %] 93 %  BP: ()/(50-57) 96/54     Weight: 84 kg (185 lb 3 oz)  Body mass index is 29 kg/m².     SpO2: (!) 93 %  O2 Device (Oxygen Therapy): room air      Intake/Output Summary (Last 24 hours) at 02/12/18 1603  Last data filed at 02/11/18 2200   Gross per 24 hour   Intake              240 ml   Output                0 ml   Net              240 ml       Lines/Drains/Airways          No matching active lines, drains, or airways          Physical Exam   Constitutional: He is oriented to person, place, and time. He appears well-developed and well-nourished. No distress.   HENT:   Head: Normocephalic.   Mouth/Throat: Oropharynx is clear and moist. No oropharyngeal exudate.   Eyes: Conjunctivae and EOM are normal. Pupils are equal, round, and reactive to light. Right eye exhibits no discharge. Left eye exhibits no discharge. No scleral icterus.   Neck: Normal range of motion. Neck supple. No JVD present. No tracheal deviation present. No thyromegaly present.   Cardiovascular: Normal rate, regular rhythm and intact distal pulses.    Pulmonary/Chest: No stridor. No respiratory distress. He has no wheezes. He has rales.   decreased breath sounds on the right  lower lobe of the right lung.    Abdominal: Soft. Bowel sounds are normal. He exhibits no distension and no mass. There is no rebound.   Musculoskeletal: Normal range of motion. He exhibits no edema, tenderness or deformity.   Lymphadenopathy:     He has no cervical adenopathy.   Neurological: He is alert and oriented to person, place, and time. He has normal reflexes. He displays normal reflexes. No cranial nerve deficit. He exhibits normal muscle tone. Coordination normal.   Skin: Skin is warm and dry. He is not diaphoretic. No erythema. No pallor.   Psychiatric: He has a normal mood and affect. His behavior is normal. Judgment normal.   Nursing note and vitals reviewed.      Significant Labs:   CMP   Recent Labs  Lab 02/11/18  1113 02/12/18  0410 02/12/18  0846    135* 135*   K 4.1 5.3* 4.5   CL 97 99 100   CO2 26 24 24   * 189* 133*   BUN 33* 39* 40*   CREATININE 2.6* 3.0* 2.7*   CALCIUM 9.2 8.9 9.2   PROT 6.7 6.2  --    ALBUMIN 3.0* 2.6*  --    BILITOT 1.8* 1.7*  --    ALKPHOS 413* 366*  --    AST 27 21  --    ALT 20 16  --    ANIONGAP 13 12 11   ESTGFRAFRICA 33.2* 27.9* 31.7*   EGFRNONAA 28.7* 24.1* 27.4*   , CBC   Recent Labs  Lab 02/11/18  1113 02/12/18  0410   WBC 6.23 5.72   HGB 8.8* 8.3*   HCT 27.3* 25.5*    249    and INR   Recent Labs  Lab 02/11/18  1113   INR 1.0       Significant Imaging: X-Ray: CXR: X-Ray Chest 1 View (CXR): No results found for this visit on 02/11/18.

## 2018-02-12 NOTE — PROGRESS NOTES
Admit Note     Met with patient to assess needs. Patient is a 42 y.o.  male, admitted for pneumonia, per pt.    Pt is s/p heart transplant.11/18/14.    Patient admitted from home on 2/11/2018 .  At this time, patient presents as alert and oriented x 4, pleasant and good eye contact.  At this time, patients wife is at bedside.    Household/Family Systems     Patient resides with patient's spouse and two children ages 15 and 20, at     [address] [place] [address].      Support system includes spouse, children and extended family.  Patient does have dependents that are in need of being cared for. Patient's 15 yr old is being cared for by family.     Patients primary caregiver is Elizabeth Crocker, patients spouse, phone number [phone].    Pt's cell: [phone]  David Crocker (brother) [phone]  Annalisa Navas (mother in law) [phone].    During admission, patient's caregiver plans to stay at home.  Confirmed patient and patients caregivers do have access to reliable transportation.    Cognitive Status/Learning     Patient reports reading ability as college and states patient does not have difficulty with reading, writing, seeing, hearing, comprehension and learning. Pt reports issues with short term memory.  Patient reports patient learns best by one on one.   Needed: No.   Highest education level: Attended College/Technical School    Vocation/Disability   .  Working for Income: No  If no, reason not working: Disability  Patient is disabled due to heart failure  since 2011.  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   Pt's spouse works full time at [employer] in [place].    Adherence     Patient reports a high level of adherence to patients health care regimen.  Adherence counseling and education provided.  Patient's caregiver verbalizes understanding.    Substance Use    Patient reports the following substance  usage.    Tobacco: none, patient denies any use.  Alcohol: none, patient denies any use.  Illicit Drugs/Non-prescribed Medications: none, patient denies any use.  Patient states clear understanding of the potential impact of substance use.  Substance abstinence/cessation counseling, education and resources provided and reviewed.     Services Utilizing/ADLS    Infusion Service: Prior to admission, patient utilizing? no  Home Health: Prior to admission, patient utilizing? no Pt  has used [facility] in the past  DME: Prior to admission, no  Pulmonary/Cardiac Rehab: Prior to admission, no Pt has gone to [facility] in the past  Dialysis:  Prior to admission, no  Transplant Specialty Pharmacy:  Prior to admission, no.    Prior to admission, patient reports patient was independent with ADLS and was driving. Pt's spouse also drives.  Patient reports patient is able to care for self at this time..  Patient indicates a willingness to care for self once medically cleared to do so.    Insurance/Medications    Payor/Plan Subscr  Sex Relation Sub. Ins. ID Effective Group Num   1. MEDICARE - ME* DARRELL OLIVAS M* 1975 Male  [id number]                                    [address]   2. BCBS [place]GD MEDI* DARRELL OLIVAS M* 1975 Male  [id number]                                   [address] [address]          Primary Insurance (for UNOS reporting): Public Insurance - Medicare FFS (Fee For Service)  Secondary Insurance (for UNOS reporting): None    Patient reports patient is able to obtain and afford medications at this time and at time of discharge.    Living Will/Healthcare Power of     Patient states patient has a LW and/or HCPA. Pt's spouse is the HCPA.  provided education regarding LW and HCPA and the completion of forms.    Coping/Mental Health    Patient is coping adequately with the aid of  family members. .  Patient indicates mental health difficulties. The pt  reports issues with depression and anxiety.  The pt takes Lexapro for depression and states his symptoms are well managed at this time. SW provided counseling support.     Discharge Planning    At time of discharge, patient plans to return to patient's home under the care of self and spouse.  Patients spouse will transport patient.  Per rounds today, expected discharge date has not been medically determined at this time. Patient and patients caretaker verbalize understanding and are involved in treatment planning and discharge process.    Additional Concerns    Patient is being followed for needs, education, resources, information, emotional support, supportive counseling, and for supportive and skilled discharge plan of care.  providing ongoing psychosocial support, education, resources and d/c planning as needed.  SW remains available.  remains available. Patient denies additional needs and/or concerns at this time. Patient verbalizes understanding and agreement with information reviewed, social work availability, and how to access available resources as needed.

## 2018-02-12 NOTE — ASSESSMENT & PLAN NOTE
S/P OHTx 11/8/14 (high risk)  - history of AMR treated with PP and IVIG 4/2016  - Continue Prograf 3/3, Prednisone 2.5mg daily and Myfortic 720 BID  - Prograf goal 6-8. Level this AM at 7.1 -

## 2018-02-12 NOTE — ASSESSMENT & PLAN NOTE
- Continue current regimen on basal with levimir at 15 U QHS and boluses with SSI   - Continue gabapentin and nortriptylin for neuropathy

## 2018-02-12 NOTE — ASSESSMENT & PLAN NOTE
BG goal 140-180  A1C 6.9%    On Lev 15u qHS and nov 3 AC  BG at goal.  Managed per primary.    Patient due for Endocrinology follow up this month.  Will schedule discharge follow up 1-2 weeks s/p discharge with NETTIE Vázquez.

## 2018-02-12 NOTE — PROGRESS NOTES
[facility]  Cardiology  Progress Note    Patient Name: Lv Crocker  MRN: [id number]  Admission Date: 2/11/2018  Hospital Length of Stay: 1 days  Code Status: Full Code   Attending Physician: Jose A Lloyd MD   Primary Care Physician: Philippe Mohr MD  Expected Discharge Date: 2/15/2018  Principal Problem:SOB (shortness of breath)    Subjective:     Hospital Course:   No notes on file    Interval History: Pt reported feeling better. Frequency of bowel movements decreasing, while consistency of the stool the same watery.     Review of Systems   Constitution: Negative.   HENT: Negative.    Cardiovascular: Negative.    Respiratory: Negative.    Musculoskeletal: Negative.    Gastrointestinal: Positive for diarrhea.   Genitourinary: Negative.    Neurological: Negative.    Psychiatric/Behavioral: Negative.      Objective:     Vital Signs (Most Recent):  Temp: 98.8 °F (37.1 °C) (02/12/18 1206)  Pulse: 102 (02/12/18 1500)  Resp: 18 (02/12/18 1206)  BP: (!) 96/54 (02/12/18 1206)  SpO2: (!) 93 % (02/12/18 1206) Vital Signs (24h Range):  Temp:  [98.2 °F (36.8 °C)-99.1 °F (37.3 °C)] 98.8 °F (37.1 °C)  Pulse:  [] 102  Resp:  [16-19] 18  SpO2:  [92 %-97 %] 93 %  BP: ()/(50-57) 96/54     Weight: 84 kg (185 lb 3 oz)  Body mass index is 29 kg/m².     SpO2: (!) 93 %  O2 Device (Oxygen Therapy): room air      Intake/Output Summary (Last 24 hours) at 02/12/18 1603  Last data filed at 02/11/18 2200   Gross per 24 hour   Intake              240 ml   Output                0 ml   Net              240 ml       Lines/Drains/Airways          No matching active lines, drains, or airways          Physical Exam   Constitutional: He is oriented to person, place, and time. He appears well-developed and well-nourished. No distress.   HENT:   Head: Normocephalic.   Mouth/Throat: Oropharynx is clear and moist. No oropharyngeal exudate.   Eyes: Conjunctivae and EOM are normal. Pupils are equal, round, and  reactive to light. Right eye exhibits no discharge. Left eye exhibits no discharge. No scleral icterus.   Neck: Normal range of motion. Neck supple. No JVD present. No tracheal deviation present. No thyromegaly present.   Cardiovascular: Normal rate, regular rhythm and intact distal pulses.    Pulmonary/Chest: No stridor. No respiratory distress. He has no wheezes. He has rales.   decreased breath sounds on the right lower lobe of the right lung.    Abdominal: Soft. Bowel sounds are normal. He exhibits no distension and no mass. There is no rebound.   Musculoskeletal: Normal range of motion. He exhibits no edema, tenderness or deformity.   Lymphadenopathy:     He has no cervical adenopathy.   Neurological: He is alert and oriented to person, place, and time. He has normal reflexes. He displays normal reflexes. No cranial nerve deficit. He exhibits normal muscle tone. Coordination normal.   Skin: Skin is warm and dry. He is not diaphoretic. No erythema. No pallor.   Psychiatric: He has a normal mood and affect. His behavior is normal. Judgment normal.   Nursing note and vitals reviewed.      Significant Labs:   CMP   Recent Labs  Lab 02/11/18  1113 02/12/18  0410 02/12/18  0846    135* 135*   K 4.1 5.3* 4.5   CL 97 99 100   CO2 26 24 24   * 189* 133*   BUN 33* 39* 40*   CREATININE 2.6* 3.0* 2.7*   CALCIUM 9.2 8.9 9.2   PROT 6.7 6.2  --    ALBUMIN 3.0* 2.6*  --    BILITOT 1.8* 1.7*  --    ALKPHOS 413* 366*  --    AST 27 21  --    ALT 20 16  --    ANIONGAP 13 12 11   ESTGFRAFRICA 33.2* 27.9* 31.7*   EGFRNONAA 28.7* 24.1* 27.4*   , CBC   Recent Labs  Lab 02/11/18  1113 02/12/18  0410   WBC 6.23 5.72   HGB 8.8* 8.3*   HCT 27.3* 25.5*    249    and INR   Recent Labs  Lab 02/11/18  1113   INR 1.0       Significant Imaging: X-Ray: CXR: X-Ray Chest 1 View (CXR): No results found for this visit on 02/11/18.    Assessment and Plan:     Brief HPI:     * SOB (shortness of breath)    - Suspected infectious  cause; with improved symptomatology  - However markers of bacterial infection are negative; normal white count and pre-calictonin level.   - Deescalate the IV antibiotics to oral.   -         Diarrhea    - Improved frequency with consistency still watery  - Will continue to monitor in hospital  - Transplant ID consulted already         Pleural effusion    - Chronic recurrent pleural effusions in the setting of post-heart transplant constrictive cardiomyopathy.   - S/P Pleurodex recently discontinued chest tube due to improved out-put.   - No significant increase in the size of effusion compared to the last one  - Will consult pulmonology to evaluate the patient for possible diagnostic and/or therapeutic tap.         Type 1 diabetes mellitus with stage 3 chronic kidney disease    - Continue current regimen on basal with levimir at 15 U QHS and boluses with SSI   - Continue gabapentin and nortriptylin for neuropathy         Heart transplanted    S/P OHTx 11/8/14 (high risk)  - history of [facility] treated with PP and IVIG 4/2016  - Continue Prograf 3/3, Prednisone 2.5mg daily and Myfortic 720 BID  - Prograf goal 6-8. Level this AM at 7.1 -             VTE Risk Mitigation         Ordered     heparin (porcine) injection 5,000 Units  Every 8 hours     Route:  Subcutaneous        02/11/18 1351     Medium Risk of VTE  Once      02/11/18 1351          Gage Goodman MD  Cardiology  [facility]

## 2018-02-12 NOTE — HPI
Pt is a 43 yo M s/p OHTX 11/14, complicated by rejection, CKD with a history of recurrent pleural effusions and ascites. As per H & P- He was getting monthly paracentesis and thoracentesis until he underwent a VATS with pleurX catheter placement on 1/11/2018. He had been draining 700-800cc of fluid every 2 days until about 3 weeks ago, where the drainage had decreased. He went to see Dr. James in clinic on 2/7/18 and it was decided to remove the catheter. Since the removal he has had fatigue, decreased appetite, watery diarrhea. Also has a dry cough which is not new but returned worse after having the pleurX pulled. Reports that he was due to get a repeat CXR later this week to re-evaluate placing a new catheter by CTS.

## 2018-02-12 NOTE — SUBJECTIVE & OBJECTIVE
Interval HPI:   Overnight events:  Patient doing well.  No complaints.   Notes his breathing is improved.     PMH, PSH, FH, SH updated and reviewed     ROS:    Review of Systems   Constitutional: Negative for unexpected weight change.   Eyes: Negative for visual disturbance.   Respiratory: Negative for shortness of breath.    Cardiovascular: Negative for chest pain.   Gastrointestinal: Negative for abdominal pain.   Genitourinary: Negative for urgency.   Musculoskeletal: Negative for arthralgias.   Skin: Negative for wound.   Neurological: Negative for headaches.   Hematological: Does not bruise/bleed easily.   Psychiatric/Behavioral: Negative for sleep disturbance.       Labs Reviewed and Include:  BASELINE Creatinine:   [email or web address]  [unfilled]  [unfilled]    Recent Labs  Lab 02/12/18  0410 02/12/18  0846   * 133*   CALCIUM 8.9 9.2   ALBUMIN 2.6*  --    PROT 6.2  --    * 135*   K 5.3* 4.5   CO2 24 24   CL 99 100   BUN 39* 40*   CREATININE 3.0* 2.7*   ALKPHOS 366*  --    ALT 16  --    AST 21  --    BILITOT 1.7*  --      Lab Results   Component Value Date    HGBA1C 6.9 (H) 02/11/2018       Nutritional status:   Body mass index is 29 kg/m².  Lab Results   Component Value Date    ALBUMIN 2.6 (L) 02/12/2018    ALBUMIN 3.0 (L) 02/11/2018    ALBUMIN 3.0 (L) 01/11/2018     Lab Results   Component Value Date    PREALBUMIN 18 (L) 03/24/2015    PREALBUMIN 19 (L) 12/17/2014    PREALBUMIN 24 12/05/2014       Estimated Creatinine Clearance: 36.2 mL/min (based on SCr of 2.7 mg/dL (H)).    POCT Glucose results:    Current Insulin Regimen:         PHYSICAL EXAMINATION:  Vitals:    02/12/18 1206   BP: (!) 96/54   Pulse: (!) 112   Resp: 18   Temp: 98.8 °F (37.1 °C)     Body mass index is 29 kg/m².    Physical Exam

## 2018-02-12 NOTE — ASSESSMENT & PLAN NOTE
- Improved frequency with consistency still watery  - Will continue to monitor in hospital  - Transplant ID consulted already

## 2018-02-12 NOTE — ASSESSMENT & PLAN NOTE
Patient has been on LT4 chronically, without any recent dose adjustment.    Current illness requiring hospitalization (febrile, HoTN, tachycardic).  Denies s/sx of hyperthyroidism.     Pt denies h/o recent exposure to  IV contrast, amiodarone or lithium.  Reports no ocular symptoms.    Likely that these abnormal labs represent euthyroidal illness.    LT4 was decreased to 125mcg per primary. Okay to continue dose.    Plan to repeat FT4 in one week.  TFTs in 4-6 weeks.

## 2018-02-12 NOTE — SUBJECTIVE & OBJECTIVE
"Past Medical History:   Diagnosis Date    Arthritis     Ascites     Thought to be 2/2 heart tpx; liver bx 3/31/17 w/o significant fibrosis    Cardiomyopathy     Depression     Controlled "for the most part" on Lexipro    Encounter for blood transfusion     during transplant    GERD (gastroesophageal reflux disease)     Hashimoto's disease     History of CHF (congestive heart failure)     Previously EF 20% (prior to heart transplant); last EF 60%, PAP 41 as of 12/12/17; throught to be 2/2 genetics & DM1    History of coronary artery disease     H/o Coronary artery disease; resolved since heart transplant 2014    History of myocardial infarction     h/o MI x3 prior to heart transplant    HLD (hyperlipidemia) 6/11/2015    Hypoglycemia unawareness in type 1 diabetes mellitus     Hypothyroid     Initially hyperthyroid 2/2 Hoshimoto's thyroiditis; now on levothyroxine 150 mcg qd    Pleural effusion due to another disorder     Thought to be 2/2 heart tpx; s/p PleuRx catheter placement and removal after 1-2 months    Pulmonary hypertension assoc with unclear multi-factorial mechanisms 12/12/2017    PAP 41 (EF 60%) on ECHO 12/12/17    Syncope 6/30/2015    Type I diabetes mellitus, well controlled     Well controlled; Dx'd @5y/o; on N insulin 20U BID & Humalog 10U w/meals +SSI; Glu 89 11/9/17; A1c 7.2% 4/5/17    Unspecified essential hypertension 05/29/2014    Well controlled; Last /57 on 11/9/17       Past Surgical History:   Procedure Laterality Date    CARDIAC CATHETERIZATION      CARDIAC SURGERY      CHOLECYSTECTOMY      CORONARY ANGIOPLASTY      FOOT SPLIT TRANSFER OF THE POSTERIOR TIBIALIS TENDON PROCEDURE      HEART TRANSPLANT  2014    KNEE SURGERY      PleuRx catheter placement  01/11/2018    Plan for removal after 1-2 months by Dr. James in cardiothoracic surgery    s/p oht  November 2014    stent placemnet         Review of patient's allergies indicates:   Allergen Reactions    " "No known drug allergies        Medications:  Prescriptions Prior to Admission   Medication Sig    aspirin (ECOTRIN) 81 MG EC tablet Take 1 tablet (81 mg total) by mouth once daily.    escitalopram oxalate (LEXAPRO) 20 MG tablet Take 1 tablet (20 mg total) by mouth once daily. (Patient taking differently: Take 20 mg by mouth every morning. )    gabapentin (NEURONTIN) 300 MG capsule Take 3 capsules (900 mg total) by mouth 3 (three) times daily.    insulin NPH (NOVOLIN N) 100 unit/mL injection Inject 20 Units into the skin 2 (two) times daily before meals.    lancets Misc 1 Device by Misc.(Non-Drug; Combo Route) route 4 (four) times daily with meals and nightly.    levothyroxine (SYNTHROID) 150 MCG tablet Take 1 tablet (150 mcg total) by mouth every morning.    magnesium oxide (MAG-OX) 400 mg tablet Take 1 tablet (400 mg total) by mouth once daily.    mycophenolate (MYFORTIC) 180 MG TbEC Take 4 tablets (720 mg total) by mouth 2 (two) times daily. S/P Heart Transplant 11/18/2014 ICD-10 Z94.1    nortriptyline (PAMELOR) 25 MG capsule Take 1 capsule (25 mg total) by mouth every evening.    ondansetron (ZOFRAN-ODT) 4 MG TbDL Take 2 tablets (8 mg total) by mouth every 8 (eight) hours as needed (nausea).    pantoprazole (PROTONIX) 40 MG tablet TAKE ONE TABLET BY MOUTH EVERY DAY    pen needle, diabetic 32 gauge x 5/32" Ndle Uses 5 pen needles a day    pravastatin (PRAVACHOL) 40 MG tablet Take 1 tablet (40 mg total) by mouth every evening. (Patient taking differently: Take 40 mg by mouth once daily. )    predniSONE (DELTASONE) 2.5 MG tablet Take 1 tablet (2.5 mg total) by mouth once daily. S/P Heart Transplant 11/18/2014 ICD-10 Z94.1    tacrolimus (PROGRAF) 1 MG Cap Taked 3mg orally in the morning and 3mg orally in the evening. S/p heart transplant  11/18/2014  ICD-10 Z94.1    tamsulosin (FLOMAX) 0.4 mg Cp24 TAKE 2 CAPSULES(0.8 MG) BY MOUTH EVERY EVENING    torsemide (DEMADEX) 20 MG Tab Take 2 tablets (40 mg " total) by mouth once daily.    glucagon (human recombinant) inj 1mg/mL kit Inject 1 mL (1 mg total) into the muscle as needed.    oxyCODONE-acetaminophen (PERCOCET) 5-325 mg per tablet Take 1 tablet by mouth every 4 (four) hours as needed for Pain.     Antibiotics     Start     Stop Route Frequency Ordered    02/12/18 1230  moxifloxacin tablet 400 mg      02/18 0859 Oral Daily 02/12/18 1116        Antifungals     None        Antivirals     None           Immunization History   Administered Date(s) Administered    Hepatitis A / Hepatitis B 12/07/2012, 02/28/2017, 08/24/2017, 10/09/2017    Influenza - High Dose 11/10/2015, 10/02/2017    Influenza - Quadrivalent - PF 12/07/2012    Influenza - Trivalent - PF (ADULT) 12/07/2012    Influenza A (H1N1) 2009 Monovalent - IM - PF 11/12/2009    Pneumococcal Conjugate - 13 Valent 10/02/2017    Pneumococcal Polysaccharide - 23 Valent 12/07/2012    Tdap 12/07/2012    Zoster 12/07/2012    influenza - Quadrivalent 11/18/2016       Family History     Problem Relation (Age of Onset)    Cancer Brother (50)    Diabetes Mother, Father, Brother, Son, Sister, Maternal Uncle, Paternal Aunt, Maternal Grandmother, Maternal Grandfather    Heart disease Mother, Brother    Hypertension Mother, Father, Brother    Kidney disease Mother, Father    Stroke Father, Brother    Vision loss Brother        Social History     Social History    Marital status:      Spouse name: N/A    Number of children: N/A    Years of education: N/A     Social History Main Topics    Smoking status: Never Smoker    Smokeless tobacco: Never Used    Alcohol use No    Drug use: No    Sexual activity: Yes     Partners: Female     Other Topics Concern    None     Social History Narrative            Breakfast: Skips 80%; cereal; Diet Sprite    Lunch: Turkey or chicken sandwich on white bread; Diet Sprite    Dinner: Grilled burgers, small amount chips; Diet Sprite    Snacks: Ronny crackers  before bed on most nights    Eats out: 2x/wk    Water: 0    PA: Walks 15 minutes (strenuous) daily    Goal weight 170-175 lbs by 1/2018     Review of Systems   Constitutional: Negative for unexpected weight change.   Eyes: Negative for visual disturbance.   Respiratory: Positive for cough and shortness of breath.    Cardiovascular: Negative for chest pain.   Gastrointestinal: Positive for diarrhea. Negative for abdominal pain.   Genitourinary: Negative for urgency.   Musculoskeletal: Negative for arthralgias.   Skin: Negative for wound.   Neurological: Negative for headaches.   Hematological: Does not bruise/bleed easily.   Psychiatric/Behavioral: Negative for sleep disturbance.     Objective:     Vital Signs (Most Recent):  Temp: 98.8 °F (37.1 °C) (02/12/18 1605)  Pulse: 104 (02/12/18 1605)  Resp: 18 (02/12/18 1605)  BP: (!) 94/53 (02/12/18 1605)  SpO2: (!) 94 % (02/12/18 1605) Vital Signs (24h Range):  Temp:  [98.2 °F (36.8 °C)-99.1 °F (37.3 °C)] 98.8 °F (37.1 °C)  Pulse:  [] 104  Resp:  [16-18] 18  SpO2:  [92 %-97 %] 94 %  BP: ()/(50-57) 94/53     Weight: 84 kg (185 lb 3 oz)  Body mass index is 29 kg/m².    Estimated Creatinine Clearance: 36.2 mL/min (based on SCr of 2.7 mg/dL (H)).    Physical Exam   Constitutional: He is oriented to person, place, and time. He appears well-developed and well-nourished. No distress.   HENT:   Head: Normocephalic and atraumatic.   Eyes: Pupils are equal, round, and reactive to light. No scleral icterus.   Neck: Neck supple. JVD present.   Cardiovascular: Regular rhythm.    No murmur heard.  Sinus Tachycardia (low 100s)   Pulmonary/Chest: Effort normal. No respiratory distress.   Decreased breath sounds in RLL. Scant crackles bilaterally    Abdominal: Soft. He exhibits no distension. There is no tenderness.   Musculoskeletal: He exhibits no edema.   Neurological: He is alert and oriented to person, place, and time.   Skin: Skin is warm and dry. Capillary refill takes 2  to 3 seconds.   Psychiatric: He has a normal mood and affect. His behavior is normal. Judgment and thought content normal.   Vitals reviewed.      Significant Labs:   CBC:   Recent Labs  Lab 02/11/18  1113 02/12/18  0410   WBC 6.23 5.72   HGB 8.8* 8.3*   HCT 27.3* 25.5*    249     CMP:   Recent Labs  Lab 02/11/18  1113 02/12/18  0410 02/12/18  0846    135* 135*   K 4.1 5.3* 4.5   CL 97 99 100   CO2 26 24 24   * 189* 133*   BUN 33* 39* 40*   CREATININE 2.6* 3.0* 2.7*   CALCIUM 9.2 8.9 9.2   PROT 6.7 6.2  --    ALBUMIN 3.0* 2.6*  --    BILITOT 1.8* 1.7*  --    ALKPHOS 413* 366*  --    AST 27 21  --    ALT 20 16  --    ANIONGAP 13 12 11   EGFRNONAA 28.7* 24.1* 27.4*       Significant Imaging: I have reviewed all pertinent imaging results/findings within the past 24 hours.     CXR: Possibly slight decrease in the right pleural effusion, otherwise, grossly stable appearance of the chest radiograph.  No new large focal consolidation.    Microbiology:  2/11 blood cx: negative  2/11 sputum cx: pending  2/11 C.diff pending  2/11 stool cx: pending  2/11 Shiga toxi pending  2/11 O&P pending  2/12 RVP pending

## 2018-02-12 NOTE — SUBJECTIVE & OBJECTIVE
"Past Medical History:   Diagnosis Date    Arthritis     Ascites     Thought to be 2/2 heart tpx; liver bx 3/31/17 w/o significant fibrosis    Cardiomyopathy     Depression     Controlled "for the most part" on Lexipro    Encounter for blood transfusion     during transplant    GERD (gastroesophageal reflux disease)     Hashimoto's disease     History of CHF (congestive heart failure)     Previously EF 20% (prior to heart transplant); last EF 60%, PAP 41 as of 12/12/17; throught to be 2/2 genetics & DM1    History of coronary artery disease     H/o Coronary artery disease; resolved since heart transplant 2014    History of myocardial infarction     h/o MI x3 prior to heart transplant    HLD (hyperlipidemia) 6/11/2015    Hypoglycemia unawareness in type 1 diabetes mellitus     Hypothyroid     Initially hyperthyroid 2/2 Hoshimoto's thyroiditis; now on levothyroxine 150 mcg qd    Pleural effusion due to another disorder     Thought to be 2/2 heart tpx; s/p PleuRx catheter placement and removal after 1-2 months    Pulmonary hypertension assoc with unclear multi-factorial mechanisms 12/12/2017    PAP 41 (EF 60%) on ECHO 12/12/17    Syncope 6/30/2015    Type I diabetes mellitus, well controlled     Well controlled; Dx'd @5y/o; on N insulin 20U BID & Humalog 10U w/meals +SSI; Glu 89 11/9/17; A1c 7.2% 4/5/17    Unspecified essential hypertension 05/29/2014    Well controlled; Last /57 on 11/9/17       Past Surgical History:   Procedure Laterality Date    CARDIAC CATHETERIZATION      CARDIAC SURGERY      CHOLECYSTECTOMY      CORONARY ANGIOPLASTY      FOOT SPLIT TRANSFER OF THE POSTERIOR TIBIALIS TENDON PROCEDURE      HEART TRANSPLANT  2014    KNEE SURGERY      PleuRx catheter placement  01/11/2018    Plan for removal after 1-2 months by Dr. James in cardiothoracic surgery    s/p oht  November 2014    stent placemnet         Review of patient's allergies indicates:   Allergen Reactions    " No known drug allergies        Family History     Problem Relation (Age of Onset)    Cancer Brother (50)    Diabetes Mother, Father, Brother, Son, Sister, Maternal Uncle, Paternal Aunt, Maternal Grandmother, Maternal Grandfather    Heart disease Mother, Brother    Hypertension Mother, Father, Brother    Kidney disease Mother, Father    Stroke Father, Brother    Vision loss Brother        Social History Main Topics    Smoking status: Never Smoker    Smokeless tobacco: Never Used    Alcohol use No    Drug use: No    Sexual activity: Yes     Partners: Female         Review of Systems   Constitutional: Positive for appetite change and fatigue.   Respiratory: Positive for shortness of breath.      Objective:     Vital Signs (Most Recent):  Temp: 98.8 °F (37.1 °C) (02/12/18 1206)  Pulse: (!) 112 (02/12/18 1206)  Resp: 18 (02/12/18 1206)  BP: (!) 96/54 (02/12/18 1206)  SpO2: (!) 93 % (02/12/18 1206) Vital Signs (24h Range):  Temp:  [98.2 °F (36.8 °C)-99.1 °F (37.3 °C)] 98.8 °F (37.1 °C)  Pulse:  [] 112  Resp:  [14-23] 18  SpO2:  [92 %-97 %] 93 %  BP: ()/(50-57) 96/54     Weight: 84 kg (185 lb 3 oz)  Body mass index is 29 kg/m².      Intake/Output Summary (Last 24 hours) at 02/12/18 1356  Last data filed at 02/11/18 2200   Gross per 24 hour   Intake              740 ml   Output                0 ml   Net              740 ml       Physical Exam  General: NAD, cooperative & interactive.  HEENT: AT/NC, PERRL, EOMI, oral mucosa moist.   Neck: Supple without JVD or palpable LAD.   Cardiac: RRR with no MRG; with brisk cap refill in distal extremities.  Respiratory: diminished breath sounds on the right but clear on the left  Abdomen: Soft, NT/ND. +BS.   Extremities: No edema.   Skin: no rashes or lesions  Neuro: Grossly intact to brief exam.    Vents:       Lines/Drains/Airways          No matching active lines, drains, or airways          Significant Labs:    CBC/Anemia Profile:    Recent Labs  Lab 02/11/18  1113  02/12/18  0410   WBC 6.23 5.72   HGB 8.8* 8.3*   HCT 27.3* 25.5*    249   MCV 93 93   RDW 13.2 13.2        Chemistries:    Recent Labs  Lab 02/11/18  1113 02/12/18  0410 02/12/18  0846    135* 135*   K 4.1 5.3* 4.5   CL 97 99 100   CO2 26 24 24   BUN 33* 39* 40*   CREATININE 2.6* 3.0* 2.7*   CALCIUM 9.2 8.9 9.2   ALBUMIN 3.0* 2.6*  --    PROT 6.7 6.2  --    BILITOT 1.8* 1.7*  --    ALKPHOS 413* 366*  --    ALT 20 16  --    AST 27 21  --    MG 2.1 2.0  --    PHOS 2.6*  --   --        Significant Imaging:   CXR: I have reviewed all pertinent results/findings within the past 24 hours and my personal findings are:  stability compared to CXR from last week with improvement in effusion.

## 2018-02-12 NOTE — ASSESSMENT & PLAN NOTE
- Chronic recurrent pleural effusions in the setting of post-heart transplant constrictive cardiomyopathy.   - S/P Pleurodex recently discontinued chest tube due to improved out-put.   - No significant increase in the size of effusion compared to the last one  - Will consult pulmonology to evaluate the patient for possible diagnostic and/or therapeutic tap.

## 2018-02-12 NOTE — CONSULTS
[facility]  Endocrinology  Consult Note    Consult Requested by: Jose A Lloyd MD   Reason for admit: SOB (shortness of breath)    HISTORY OF PRESENT ILLNESS:  Reason for Consult:  Abnormal thyroid labs - concerning for over medication. Recommendation on dose adjustment v additional workup     Surgical Procedure and Date: s/p heart transplant 2014    Diabetes diagnosis year: 7yo (T1DM)    Home Diabetes Medications:    NPH 20u qAM and 18u qPM  Regular insulin 10u AC          HPI:   Patient is a 42 y.o. male with a diagnosis of T1DM, HTN, hypothyroidism, s/p heart transplant.  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 He required serial paracentesis and thoracentesis until he underwent a VATS with pleurX catheter placement on 1/11/2018 and removed 2/7/18.       Since removal of cath, patient has had low grade fever, diarrhea, and SOB.  Upon initial labs, TSH was decreased (0.082) and free T4 1.55.  Endocrinology consulted to assist in management of these labs.  He was last seen in clinic by Kamala Vázquez NP 11/2017.  Insulin regimen adjusted and LT4 continued at usual dose of 150mcg.        Medications and/or Treatments Impacting Glycemic Control:  Immunotherapy:    Immunosuppressants         Stop Route Frequency     mycophenolate EC tablet 720 mg      -- Oral 2 times daily     tacrolimus capsule 3 mg      -- Oral 2 times daily        Steroids:   Hormones     Start     Stop Route Frequency Ordered    02/12/18 0900  predniSONE tablet 2.5 mg      -- Oral Daily 02/11/18 1350        Pressors:    Autonomic Drugs     None          Prescriptions Prior to Admission   Medication Sig Dispense Refill Last Dose    aspirin (ECOTRIN) 81 MG EC tablet Take 1 tablet (81 mg total) by mouth once daily. 30 tablet 11 2/11/2018 at Unknown time    escitalopram oxalate (LEXAPRO) 20 MG tablet Take 1 tablet (20  "mg total) by mouth once daily. (Patient taking differently: Take 20 mg by mouth every morning. ) 30 tablet 11 2/11/2018 at Unknown time    gabapentin (NEURONTIN) 300 MG capsule Take 3 capsules (900 mg total) by mouth 3 (three) times daily. 270 capsule 11 2/11/2018 at Unknown time    insulin NPH (NOVOLIN N) 100 unit/mL injection Inject 20 Units into the skin 2 (two) times daily before meals. 1 vial 0 2/10/2018 at Unknown time    lancets Misc 1 Device by Misc.(Non-Drug; Combo Route) route 4 (four) times daily with meals and nightly. 100 each 5 2/10/2018 at Unknown time    levothyroxine (SYNTHROID) 150 MCG tablet Take 1 tablet (150 mcg total) by mouth every morning. 30 tablet 12 2/11/2018 at Unknown time    magnesium oxide (MAG-OX) 400 mg tablet Take 1 tablet (400 mg total) by mouth once daily. 30 tablet 11 2/11/2018 at Unknown time    mycophenolate (MYFORTIC) 180 MG TbEC Take 4 tablets (720 mg total) by mouth 2 (two) times daily. S/P Heart Transplant 11/18/2014 ICD-10 Z94.1 240 tablet 11 2/11/2018 at Unknown time    nortriptyline (PAMELOR) 25 MG capsule Take 1 capsule (25 mg total) by mouth every evening. 30 capsule 3 2/10/2018 at Unknown time    ondansetron (ZOFRAN-ODT) 4 MG TbDL Take 2 tablets (8 mg total) by mouth every 8 (eight) hours as needed (nausea). 15 tablet 0 Past Week at Unknown time    pantoprazole (PROTONIX) 40 MG tablet TAKE ONE TABLET BY MOUTH EVERY DAY 30 tablet 11 2/11/2018 at Unknown time    pen needle, diabetic 32 gauge x 5/32" Ndle Uses 5 pen needles a day 200 each 12 2/10/2018 at Unknown time    pravastatin (PRAVACHOL) 40 MG tablet Take 1 tablet (40 mg total) by mouth every evening. (Patient taking differently: Take 40 mg by mouth once daily. ) 30 tablet 11 2/11/2018 at Unknown time    predniSONE (DELTASONE) 2.5 MG tablet Take 1 tablet (2.5 mg total) by mouth once daily. S/P Heart Transplant 11/18/2014 ICD-10 Z94.1 30 tablet 11 2/11/2018 at Unknown time    tacrolimus (PROGRAF) 1 MG " Cap Taked 3mg orally in the morning and 3mg orally in the evening. S/p heart transplant  11/18/2014  ICD-10 Z94.1 180 capsule 11 2/11/2018 at Unknown time    tamsulosin (FLOMAX) 0.4 mg Cp24 TAKE 2 CAPSULES(0.8 MG) BY MOUTH EVERY EVENING 60 capsule 11 2/10/2018 at Unknown time    torsemide (DEMADEX) 20 MG Tab Take 2 tablets (40 mg total) by mouth once daily. 60 tablet 11 2/11/2018 at Unknown time    glucagon (human recombinant) inj 1mg/mL kit Inject 1 mL (1 mg total) into the muscle as needed. 1 kit 6 More than a month at Unknown time    oxyCODONE-acetaminophen (PERCOCET) 5-325 mg per tablet Take 1 tablet by mouth every 4 (four) hours as needed for Pain. 31 tablet 0 More than a month at Unknown time       Current Facility-Administered Medications   Medication Dose Route Frequency Provider Last Rate Last Dose    aspirin EC tablet 81 mg  81 mg Oral Daily Mamadou Paris, DO   81 mg at 02/12/18 0815    dextrose 50% injection 12.5 g  12.5 g Intravenous PRN Mamadou Paris, DO        dextrose 50% injection 25 g  25 g Intravenous PRN Mamadou Paris, DO        escitalopram oxalate tablet 20 mg  20 mg Oral QAM Mamadou Paris, DO   20 mg at 02/12/18 0543    gabapentin capsule 900 mg  900 mg Oral TID Mamadou Paris, DO   900 mg at 02/12/18 0542    glucagon (human recombinant) injection 1 mg  1 mg Intramuscular PRN Mamadou Paris, DO        glucose chewable tablet 16 g  16 g Oral PRN Mamadou Paris, DO        glucose chewable tablet 24 g  24 g Oral PRN Mamadou Paris, DO        heparin (porcine) injection 5,000 Units  5,000 Units Subcutaneous Q8H Mamadou Paris, DO   5,000 Units at 02/12/18 0542    insulin aspart pen 1-10 Units  1-10 Units Subcutaneous QID (AC + HS) PRN Mamadou Paris, DO        insulin aspart pen 3 Units  3 Units Subcutaneous TIDWM Mamadou Paris, DO   3 Units at 02/12/18 0817    insulin detemir pen 15 Units  15 Units Subcutaneous QHS Mamadou Paris, DO   15 Units at 02/11/18 4723     levothyroxine tablet 125 mcg  125 mcg Oral QAM Mamadou Paris, DO   125 mcg at 02/12/18 0543    magnesium oxide tablet 400 mg  400 mg Oral Daily Mamadou Paris, DO   400 mg at 02/12/18 0814    moxifloxacin tablet 400 mg  400 mg Oral Daily Heriberto Rosa MD   400 mg at 02/12/18 1150    mycophenolate EC tablet 720 mg  720 mg Oral BID Mamadou Paris, DO   720 mg at 02/12/18 0814    nortriptyline capsule 25 mg  25 mg Oral QHS Mamadou Paris, DO   25 mg at 02/11/18 2251    ondansetron disintegrating tablet 8 mg  8 mg Oral Q8H PRN Mamadou Paris, DO        pantoprazole EC tablet 40 mg  40 mg Oral Daily Mamadou Paris, DO   40 mg at 02/12/18 0814    pravastatin tablet 40 mg  40 mg Oral Daily Mamadou Paris, DO   40 mg at 02/12/18 0814    predniSONE tablet 2.5 mg  2.5 mg Oral Daily Mamadou Paris, DO   2.5 mg at 02/12/18 0814    sodium chloride 0.9% flush 3 mL  3 mL Intravenous Q8H Mamadou Paris, DO   3 mL at 02/12/18 0542    tacrolimus capsule 3 mg  3 mg Oral BID Mamadou Paris, DO   3 mg at 02/12/18 0813    tamsulosin 24 hr capsule 0.8 mg  0.8 mg Oral QHS Mamadou Paris, DO   0.8 mg at 02/11/18 2249    torsemide tablet 40 mg  40 mg Oral Daily Mamadou Paris, DO   40 mg at 02/12/18 0813       Interval HPI:   Overnight events:  Patient doing well.  No complaints.   Notes his breathing is improved.     PMH, PSH, FH, SH updated and reviewed     ROS:    Review of Systems   Constitutional: Negative for unexpected weight change.   Eyes: Negative for visual disturbance.   Respiratory: Negative for shortness of breath.    Cardiovascular: Negative for chest pain.   Gastrointestinal: Negative for abdominal pain.   Genitourinary: Negative for urgency.   Musculoskeletal: Negative for arthralgias.   Skin: Negative for wound.   Neurological: Negative for headaches.   Hematological: Does not bruise/bleed easily.   Psychiatric/Behavioral: Negative for sleep disturbance.       Labs Reviewed and Include:  BASELINE  Creatinine:   @[facility]@  @[facility]@  @[facility]@    Recent Labs  Lab 02/12/18  0410 02/12/18  0846   * 133*   CALCIUM 8.9 9.2   ALBUMIN 2.6*  --    PROT 6.2  --    * 135*   K 5.3* 4.5   CO2 24 24   CL 99 100   BUN 39* 40*   CREATININE 3.0* 2.7*   ALKPHOS 366*  --    ALT 16  --    AST 21  --    BILITOT 1.7*  --      Lab Results   Component Value Date    HGBA1C 6.9 (H) 02/11/2018       Nutritional status:   Body mass index is 29 kg/m².  Lab Results   Component Value Date    ALBUMIN 2.6 (L) 02/12/2018    ALBUMIN 3.0 (L) 02/11/2018    ALBUMIN 3.0 (L) 01/11/2018     Lab Results   Component Value Date    PREALBUMIN 18 (L) 03/24/2015    PREALBUMIN 19 (L) 12/17/2014    PREALBUMIN 24 12/05/2014       Estimated Creatinine Clearance: 36.2 mL/min (based on SCr of 2.7 mg/dL (H)).    POCT Glucose results:    Current Insulin Regimen:         PHYSICAL EXAMINATION:  Vitals:    02/12/18 1206   BP: (!) 96/54   Pulse: (!) 112   Resp: 18   Temp: 98.8 °F (37.1 °C)     Body mass index is 29 kg/m².    Physical Exam  .     ASSESSMENT and PLAN:    * SOB (shortness of breath)    Managed per primary.  Considering PNA v effusions.         Diarrhea    Managed per primary.  Concern that this could be related to hyperthyroidism v gut edema.            Type 1 diabetes mellitus with stage 3 chronic kidney disease    BG goal 140-180  A1C 6.9%    On Lev 15u qHS and nov 3 AC  BG at goal.  Managed per primary.    Patient due for Endocrinology follow up this month.  Will schedule discharge follow up 1-2 weeks s/p discharge with NETTIE Vázquez.          Hypothyroidism due to Hashimoto's thyroiditis    Patient has been on LT4 chronically, without any recent dose adjustment.    Current illness requiring hospitalization (febrile, HoTN, tachycardic).  Denies s/sx of hyperthyroidism.     Pt denies h/o recent exposure to  IV contrast, amiodarone or lithium.  Reports no ocular symptoms.    Likely that these abnormal labs represent euthyroidal illness.     LT4 was decreased to 125mcg per primary. Okay to continue dose.    Plan to repeat FT4 in one week.  TFTs in 4-6 weeks.        Heart transplanted    Avoid hypoglycemia            DISCHARGE NEEDS: will assess daily    Deonna Larry MD  Endocrinology  [facility]

## 2018-02-12 NOTE — CONSULTS
"[facility]  Pulmonology  Consult Note    Patient Name: Lv Crocker  MRN: [id number]  Admission Date: 2/11/2018  Hospital Length of Stay: 1 days  Code Status: Full Code  Attending Physician: Jose A Lloyd MD  Primary Care Provider: Philippe Mohr MD   Principal Problem: SOB (shortness of breath)    Inpatient consult to Pulmonology  Consult performed by: SERAFIN MURGUIA  Consult ordered by: SURENDRA HARTMANN  Reason for consult: recurrent pleural effusion        Subjective:     HPI:  Pt is a 43 yo M s/p OHTX 11/14, complicated by rejection, CKD with a history of recurrent pleural effusions and ascites. As per H & P- He was getting monthly paracentesis and thoracentesis until he underwent a VATS with pleurX catheter placement on 1/11/2018. He had been draining 700-800cc of fluid every 2 days until about 3 weeks ago, where the drainage had decreased. He went to see Dr. James in clinic on 2/7/18 and it was decided to remove the catheter. Since the removal he has had fatigue, decreased appetite, watery diarrhea. Also has a dry cough which is not new but returned worse after having the pleurX pulled. Reports that he was due to get a repeat CXR later this week to re-evaluate placing a new catheter by CTS.    Past Medical History:   Diagnosis Date    Arthritis     Ascites     Thought to be 2/2 heart tpx; liver bx 3/31/17 w/o significant fibrosis    Cardiomyopathy     Depression     Controlled "for the most part" on Lexipro    Encounter for blood transfusion     during transplant    GERD (gastroesophageal reflux disease)     Hashimoto's disease     History of CHF (congestive heart failure)     Previously EF 20% (prior to heart transplant); last EF 60%, PAP 41 as of 12/12/17; throught to be 2/2 genetics & DM1    History of coronary artery disease     H/o Coronary artery disease; resolved since heart transplant 2014    History of myocardial infarction     h/o MI x3 prior to heart " transplant    HLD (hyperlipidemia) 6/11/2015    Hypoglycemia unawareness in type 1 diabetes mellitus     Hypothyroid     Initially hyperthyroid 2/2 Hoshimoto's thyroiditis; now on levothyroxine 150 mcg qd    Pleural effusion due to another disorder     Thought to be 2/2 heart tpx; s/p PleuRx catheter placement and removal after 1-2 months    Pulmonary hypertension assoc with unclear multi-factorial mechanisms 12/12/2017    PAP 41 (EF 60%) on ECHO 12/12/17    Syncope 6/30/2015    Type I diabetes mellitus, well controlled     Well controlled; Dx'd @7y/o; on N insulin 20U BID & Humalog 10U w/meals +SSI; Glu 89 11/9/17; A1c 7.2% 4/5/17    Unspecified essential hypertension 05/29/2014    Well controlled; Last /57 on 11/9/17       Past Surgical History:   Procedure Laterality Date    CARDIAC CATHETERIZATION      CARDIAC SURGERY      CHOLECYSTECTOMY      CORONARY ANGIOPLASTY      FOOT SPLIT TRANSFER OF THE POSTERIOR TIBIALIS TENDON PROCEDURE      HEART TRANSPLANT  2014    KNEE SURGERY      PleuRx catheter placement  01/11/2018    Plan for removal after 1-2 months by Dr. James in cardiothoracic surgery    s/p oht  November 2014    stent placemnet         Review of patient's allergies indicates:   Allergen Reactions    No known drug allergies        Family History     Problem Relation (Age of Onset)    Cancer Brother (50)    Diabetes Mother, Father, Brother, Son, Sister, Maternal Uncle, Paternal Aunt, Maternal Grandmother, Maternal Grandfather    Heart disease Mother, Brother    Hypertension Mother, Father, Brother    Kidney disease Mother, Father    Stroke Father, Brother    Vision loss Brother        Social History Main Topics    Smoking status: Never Smoker    Smokeless tobacco: Never Used    Alcohol use No    Drug use: No    Sexual activity: Yes     Partners: Female         Review of Systems   Constitutional: Positive for appetite change and fatigue.   Respiratory: Positive for shortness  of breath.      Objective:     Vital Signs (Most Recent):  Temp: 98.8 °F (37.1 °C) (02/12/18 1206)  Pulse: (!) 112 (02/12/18 1206)  Resp: 18 (02/12/18 1206)  BP: (!) 96/54 (02/12/18 1206)  SpO2: (!) 93 % (02/12/18 1206) Vital Signs (24h Range):  Temp:  [98.2 °F (36.8 °C)-99.1 °F (37.3 °C)] 98.8 °F (37.1 °C)  Pulse:  [] 112  Resp:  [14-23] 18  SpO2:  [92 %-97 %] 93 %  BP: ()/(50-57) 96/54     Weight: 84 kg (185 lb 3 oz)  Body mass index is 29 kg/m².      Intake/Output Summary (Last 24 hours) at 02/12/18 1356  Last data filed at 02/11/18 2200   Gross per 24 hour   Intake              740 ml   Output                0 ml   Net              740 ml       Physical Exam  General: NAD, cooperative & interactive.  HEENT: AT/NC, PERRL, EOMI, oral mucosa moist.   Neck: Supple without JVD or palpable LAD.   Cardiac: RRR with no MRG; with brisk cap refill in distal extremities.  Respiratory: diminished breath sounds on the right but clear on the left  Abdomen: Soft, NT/ND. +BS.   Extremities: No edema.   Skin: no rashes or lesions  Neuro: Grossly intact to brief exam.    Vents:       Lines/Drains/Airways          No matching active lines, drains, or airways          Significant Labs:    CBC/Anemia Profile:    Recent Labs  Lab 02/11/18  1113 02/12/18  0410   WBC 6.23 5.72   HGB 8.8* 8.3*   HCT 27.3* 25.5*    249   MCV 93 93   RDW 13.2 13.2        Chemistries:    Recent Labs  Lab 02/11/18  1113 02/12/18  0410 02/12/18  0846    135* 135*   K 4.1 5.3* 4.5   CL 97 99 100   CO2 26 24 24   BUN 33* 39* 40*   CREATININE 2.6* 3.0* 2.7*   CALCIUM 9.2 8.9 9.2   ALBUMIN 3.0* 2.6*  --    PROT 6.7 6.2  --    BILITOT 1.8* 1.7*  --    ALKPHOS 413* 366*  --    ALT 20 16  --    AST 27 21  --    MG 2.1 2.0  --    PHOS 2.6*  --   --        Significant Imaging:   CXR: I have reviewed all pertinent results/findings within the past 24 hours and my personal findings are:  stability compared to CXR from last week with improvement  in effusion.     Assessment/Plan:     Pleural effusion    This has been chronic for this patient and has been followed by CTS. At this time, suspect viral infection. CXR with stability of pleural fluid and suspect he has an element of chronic scarring. Feeling much better today as per him. No elevation in WBC count, no fevers, procalcitonin negative. Flu negative though certainly could have been the flu last week when symptoms started. No indication for thoracentesis at this time. Given his immunocompromised state- if he were to worsen clinically then would need a CT chest to investigate if the fluid has become loculated or has re-accumulated at all. Would let CTS know that he is here as well.               Thank you for your consult. We will follow with you.     Nay Castorena MD  Pulmonology  [facility]

## 2018-02-12 NOTE — NURSING
Rounds Report: Attended interdisciplinary rounds this afternoon with the transplant team including SW, physicians, fellows,  mid-level providers, and transplant coordinators.  Discussed plan of care. No DC date discussed at this time.

## 2018-02-12 NOTE — CONSULTS
Consult acknowledged and reviewed for heart transplant recipient admitted with SOB in setting of chronic pleural effusion. Formal note to follow.    Maribell Eric MD  Transplant ID Attending  [phone]

## 2018-02-12 NOTE — CONSULTS
[facility]  Infectious Disease  Consult Note    Patient Name: Lv Crocker  MRN: [id number]  Admission Date: 2/11/2018  Hospital Length of Stay: 1 days  Attending Physician: Jose A Lloyd MD  Primary Care Provider: Philippe Mohr MD     Isolation Status: No active isolations    Patient information was obtained from patient, spouse/SO and past medical records.      Consults: possible parapneumonic effusion  Assessment/Plan:     * SOB (shortness of breath)    41yo man w/a history of ICM (s/p OHT 11/18/2014, CMV D+/R+, steroid induction, on maintenance tacro/MMF/pred; c/b early hemorrhagic tamponade requiring washout, delayed closure, and pericardial window and subsequent AF w/RVR and CACHORRO; late course c/b ACR and recurrent pleural effusions s/p VATS/pleurex catheter 1/18 and ascites requiring serial taps with pleurex removed on 2/7 due to decreasing drainage) who was admitted on 2/11/2018 with low-grade temps (100.5F), mild increase in SOB/dry cough since pleurex removal, and loose stools that is mildly improved on empiric coverage with regards to his respiratory symptoms. Differential most notable for community acquired viral syndrome vs LRTI. He is stable.    - may continue empiric moxifloxacin and will complete 7 day course for possible bacterial LRTI  - would have CTSx see him to comment on need for thoracentesis/pleurex placement from their perspective -- if placed, please send fluid for counts/cx and standard studies  - await pending studies already in progress -- have added GI viral panel            Thank you for your consult. I will follow-up with patient. Please contact us if you have any additional questions.     Maribell Eric MD  Transplant ID Attending  [phone]    Maribell Eric MD  Infectious Disease  [facility]    Subjective:     Principal Problem: SOB (shortness of breath)    HPI: Mr. Crocker is a 41yo man w/a history of ICM (s/p OHT 11/18/2014,  "CMV D+/R+, steroid induction, on maintenance tacro/MMF/pred; c/b early hemorrhagic tamponade requiring washout, delayed closure, and pericardial window and subsequent AF w/RVR and CACHORRO; late course c/b ACR and recurrent pleural effusions s/p VATS/pleurex catheter 1/18 and ascites requiring serial taps with pleurex removed on 2/7 due to decreasing drainage) who was admitted on 2/11/2018 with low-grade temps (100.5F), mild increase in SOB/dry cough since pleurex removal, and loose stools. He was started on broad spectrum coverage with vanc/cefepime/azithro overnight prior to ID consultation today and transition to empiric moxifloxacin by his primary team. He notes his cough is a bit better and has been afebrile but continues to have loose stools. Blood/stool cx and C.diff testing are negative and CXR shows stable pleural fluid without a new obvious infiltrate.    Past Medical History:   Diagnosis Date    Arthritis     Ascites     Thought to be 2/2 heart tpx; liver bx 3/31/17 w/o significant fibrosis    Cardiomyopathy     Depression     Controlled "for the most part" on Lexipro    Encounter for blood transfusion     during transplant    GERD (gastroesophageal reflux disease)     Hashimoto's disease     History of CHF (congestive heart failure)     Previously EF 20% (prior to heart transplant); last EF 60%, PAP 41 as of 12/12/17; throught to be 2/2 genetics & DM1    History of coronary artery disease     H/o Coronary artery disease; resolved since heart transplant 2014    History of myocardial infarction     h/o MI x3 prior to heart transplant    HLD (hyperlipidemia) 6/11/2015    Hypoglycemia unawareness in type 1 diabetes mellitus     Hypothyroid     Initially hyperthyroid 2/2 Hoshimoto's thyroiditis; now on levothyroxine 150 mcg qd    Pleural effusion due to another disorder     Thought to be 2/2 heart tpx; s/p PleuRx catheter placement and removal after 1-2 months    Pulmonary hypertension assoc with " unclear multi-factorial mechanisms 12/12/2017    PAP 41 (EF 60%) on ECHO 12/12/17    Syncope 6/30/2015    Type I diabetes mellitus, well controlled     Well controlled; Dx'd @7y/o; on N insulin 20U BID & Humalog 10U w/meals +SSI; Glu 89 11/9/17; A1c 7.2% 4/5/17    Unspecified essential hypertension 05/29/2014    Well controlled; Last /57 on 11/9/17       Past Surgical History:   Procedure Laterality Date    CARDIAC CATHETERIZATION      CARDIAC SURGERY      CHOLECYSTECTOMY      CORONARY ANGIOPLASTY      FOOT SPLIT TRANSFER OF THE POSTERIOR TIBIALIS TENDON PROCEDURE      HEART TRANSPLANT  2014    KNEE SURGERY      PleuRx catheter placement  01/11/2018    Plan for removal after 1-2 months by Dr. James in cardiothoracic surgery    s/p oht  November 2014    stent placemnet         Review of patient's allergies indicates:   Allergen Reactions    No known drug allergies        Medications:  Prescriptions Prior to Admission   Medication Sig    aspirin (ECOTRIN) 81 MG EC tablet Take 1 tablet (81 mg total) by mouth once daily.    escitalopram oxalate (LEXAPRO) 20 MG tablet Take 1 tablet (20 mg total) by mouth once daily. (Patient taking differently: Take 20 mg by mouth every morning. )    gabapentin (NEURONTIN) 300 MG capsule Take 3 capsules (900 mg total) by mouth 3 (three) times daily.    insulin NPH (NOVOLIN N) 100 unit/mL injection Inject 20 Units into the skin 2 (two) times daily before meals.    lancets Misc 1 Device by Misc.(Non-Drug; Combo Route) route 4 (four) times daily with meals and nightly.    levothyroxine (SYNTHROID) 150 MCG tablet Take 1 tablet (150 mcg total) by mouth every morning.    magnesium oxide (MAG-OX) 400 mg tablet Take 1 tablet (400 mg total) by mouth once daily.    mycophenolate (MYFORTIC) 180 MG TbEC Take 4 tablets (720 mg total) by mouth 2 (two) times daily. S/P Heart Transplant 11/18/2014 ICD-10 Z94.1    nortriptyline (PAMELOR) 25 MG capsule Take 1 capsule (25  "mg total) by mouth every evening.    ondansetron (ZOFRAN-ODT) 4 MG TbDL Take 2 tablets (8 mg total) by mouth every 8 (eight) hours as needed (nausea).    pantoprazole (PROTONIX) 40 MG tablet TAKE ONE TABLET BY MOUTH EVERY DAY    pen needle, diabetic 32 gauge x 5/32" Ndle Uses 5 pen needles a day    pravastatin (PRAVACHOL) 40 MG tablet Take 1 tablet (40 mg total) by mouth every evening. (Patient taking differently: Take 40 mg by mouth once daily. )    predniSONE (DELTASONE) 2.5 MG tablet Take 1 tablet (2.5 mg total) by mouth once daily. S/P Heart Transplant 11/18/2014 ICD-10 Z94.1    tacrolimus (PROGRAF) 1 MG Cap Taked 3mg orally in the morning and 3mg orally in the evening. S/p heart transplant  11/18/2014  ICD-10 Z94.1    tamsulosin (FLOMAX) 0.4 mg Cp24 TAKE 2 CAPSULES(0.8 MG) BY MOUTH EVERY EVENING    torsemide (DEMADEX) 20 MG Tab Take 2 tablets (40 mg total) by mouth once daily.    glucagon (human recombinant) inj 1mg/mL kit Inject 1 mL (1 mg total) into the muscle as needed.    oxyCODONE-acetaminophen (PERCOCET) 5-325 mg per tablet Take 1 tablet by mouth every 4 (four) hours as needed for Pain.     Antibiotics     Start     Stop Route Frequency Ordered    02/12/18 1230  moxifloxacin tablet 400 mg      02/18 0859 Oral Daily 02/12/18 1116        Antifungals     None        Antivirals     None           Immunization History   Administered Date(s) Administered    Hepatitis A / Hepatitis B 12/07/2012, 02/28/2017, 08/24/2017, 10/09/2017    Influenza - High Dose 11/10/2015, 10/02/2017    Influenza - Quadrivalent - PF 12/07/2012    Influenza - Trivalent - PF (ADULT) 12/07/2012    Influenza A (H1N1) 2009 Monovalent - IM - PF 11/12/2009    Pneumococcal Conjugate - 13 Valent 10/02/2017    Pneumococcal Polysaccharide - 23 Valent 12/07/2012    Tdap 12/07/2012    Zoster 12/07/2012    influenza - Quadrivalent 11/18/2016       Family History     Problem Relation (Age of Onset)    Cancer Brother (50)    " Diabetes Mother, Father, Brother, Son, Sister, Maternal Uncle, Paternal Aunt, Maternal Grandmother, Maternal Grandfather    Heart disease Mother, Brother    Hypertension Mother, Father, Brother    Kidney disease Mother, Father    Stroke Father, Brother    Vision loss Brother        Social History     Social History    Marital status:      Spouse name: N/A    Number of children: N/A    Years of education: N/A     Social History Main Topics    Smoking status: Never Smoker    Smokeless tobacco: Never Used    Alcohol use No    Drug use: No    Sexual activity: Yes     Partners: Female     Other Topics Concern    None     Social History Narrative            Breakfast: Skips 80%; cereal; Diet Sprite    Lunch: Turkey or chicken sandwich on white bread; Diet Sprite    Dinner: Grilled burgers, small amount chips; Diet Sprite    Snacks: Ronny crackers before bed on most nights    Eats out: 2x/wk    Water: 0    PA: Walks 15 minutes (strenuous) daily    Goal weight 170-175 lbs by 1/2018     Review of Systems   Constitutional: Negative for unexpected weight change.   Eyes: Negative for visual disturbance.   Respiratory: Positive for cough and shortness of breath.    Cardiovascular: Negative for chest pain.   Gastrointestinal: Positive for diarrhea. Negative for abdominal pain.   Genitourinary: Negative for urgency.   Musculoskeletal: Negative for arthralgias.   Skin: Negative for wound.   Neurological: Negative for headaches.   Hematological: Does not bruise/bleed easily.   Psychiatric/Behavioral: Negative for sleep disturbance.     Objective:     Vital Signs (Most Recent):  Temp: 98.8 °F (37.1 °C) (02/12/18 1605)  Pulse: 104 (02/12/18 1605)  Resp: 18 (02/12/18 1605)  BP: (!) 94/53 (02/12/18 1605)  SpO2: (!) 94 % (02/12/18 1605) Vital Signs (24h Range):  Temp:  [98.2 °F (36.8 °C)-99.1 °F (37.3 °C)] 98.8 °F (37.1 °C)  Pulse:  [] 104  Resp:  [16-18] 18  SpO2:  [92 %-97 %] 94 %  BP: ()/(50-57)  94/53     Weight: 84 kg (185 lb 3 oz)  Body mass index is 29 kg/m².    Estimated Creatinine Clearance: 36.2 mL/min (based on SCr of 2.7 mg/dL (H)).    Physical Exam   Constitutional: He is oriented to person, place, and time. He appears well-developed and well-nourished. No distress.   HENT:   Head: Normocephalic and atraumatic.   Eyes: Pupils are equal, round, and reactive to light. No scleral icterus.   Neck: Neck supple. JVD present.   Cardiovascular: Regular rhythm.    No murmur heard.  Sinus Tachycardia (low 100s)   Pulmonary/Chest: Effort normal. No respiratory distress.   Decreased breath sounds in RLL. Scant crackles bilaterally    Abdominal: Soft. He exhibits no distension. There is no tenderness.   Musculoskeletal: He exhibits no edema.   Neurological: He is alert and oriented to person, place, and time.   Skin: Skin is warm and dry. Capillary refill takes 2 to 3 seconds.   Psychiatric: He has a normal mood and affect. His behavior is normal. Judgment and thought content normal.   Vitals reviewed.      Significant Labs:   CBC:   Recent Labs  Lab 02/11/18  1113 02/12/18  0410   WBC 6.23 5.72   HGB 8.8* 8.3*   HCT 27.3* 25.5*    249     CMP:   Recent Labs  Lab 02/11/18  1113 02/12/18  0410 02/12/18  0846    135* 135*   K 4.1 5.3* 4.5   CL 97 99 100   CO2 26 24 24   * 189* 133*   BUN 33* 39* 40*   CREATININE 2.6* 3.0* 2.7*   CALCIUM 9.2 8.9 9.2   PROT 6.7 6.2  --    ALBUMIN 3.0* 2.6*  --    BILITOT 1.8* 1.7*  --    ALKPHOS 413* 366*  --    AST 27 21  --    ALT 20 16  --    ANIONGAP 13 12 11   EGFRNONAA 28.7* 24.1* 27.4*       Significant Imaging: I have reviewed all pertinent imaging results/findings within the past 24 hours.     CXR: Possibly slight decrease in the right pleural effusion, otherwise, grossly stable appearance of the chest radiograph.  No new large focal consolidation.    Microbiology:  2/11 blood cx: negative  2/11 sputum cx: pending  2/11 C.diff pending  2/11 stool cx:  pending  2/11 Shiga toxi pending  2/11 O&P pending  2/12 RVP pending

## 2018-02-12 NOTE — PLAN OF CARE
Problem: Patient Care Overview  Goal: Plan of Care Review  Outcome: Ongoing (interventions implemented as appropriate)  Pt had no significant events overnight. Pt on C. Diff precautions due to continuous looses stools. Pt was started on antibiotics. Pt encourage to turn often to prevent pressure ulcers from forming. Pt encourage to call when needing to ambulate and feeling dizzy or weak. Pt is ACHS due to type 1 diabetes. VSS, pt spouse stayed at the bedside, call light within reach. POC discussed with pt, both acknowledged understanding.

## 2018-02-12 NOTE — ASSESSMENT & PLAN NOTE
This has been chronic for this patient and has been followed by CTS. At this time, suspect viral infection. CXR with stability of pleural fluid and suspect he has an element of chronic scarring. Feeling much better today as per him. No elevation in WBC count, no fevers, procalcitonin negative. Flu negative though certainly could have been the flu last week when symptoms started. No indication for thoracentesis at this time. Given his immunocompromised state- if he were to worsen clinically then would need a CT chest to investigate if the fluid has become loculated or has re-accumulated at all. Would let CTS know that he is here as well.

## 2018-02-12 NOTE — HPI
Reason for Consult:  Abnormal thyroid labs - concerning for over medication. Recommendation on dose adjustment v additional workup     Surgical Procedure and Date: s/p heart transplant 2014    Diabetes diagnosis year: 5yo (T1DM)    Home Diabetes Medications:    NPH 20u qAM and 18u qPM  Regular insulin 10u AC    How often checking glucose at home? 4 times daily   BG readings on regimen: 150-200s  Hypoglycemia on the regimen?  Yes, rarely - treats appropriately (light snack, glucose tab)  Missed doses on regimen?  No      Diabetes Complications include:     Hyperglycemia, Hypoglycemia  and Diabetic chronic kidney disease         Complicating diabetes co morbidities:   N/A    HPI:   Patient is a 42 y.o. male with a diagnosis of T1DM, HTN, hypothyroidism, s/p heart transplant.  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 He required serial paracentesis and thoracentesis until he underwent a VATS with pleurX catheter placement on 1/11/2018 and removed 2/7/18.       Since removal of cath, patient has had low grade fever, diarrhea, and SOB.  Upon initial labs, TSH was decreased (0.082) and free T4 1.55.  Endocrinology consulted to assist in management of these labs.  He was last seen in clinic by Kamala Vázquez NP 11/2017.  Insulin regimen adjusted and LT4 continued at usual dose of 150mcg.

## 2018-02-12 NOTE — ASSESSMENT & PLAN NOTE
41yo man w/a history of ICM (s/p OHT 11/18/2014, CMV D+/R+, steroid induction, on maintenance tacro/MMF/pred; c/b early hemorrhagic tamponade requiring washout, delayed closure, and pericardial window and subsequent AF w/RVR and CACHORRO; late course c/b ACR and recurrent pleural effusions s/p VATS/pleurex catheter 1/18 and ascites requiring serial taps with pleurex removed on 2/7 due to decreasing drainage) who was admitted on 2/11/2018 with low-grade temps (100.5F), mild increase in SOB/dry cough since pleurex removal, and loose stools that is mildly improved on empiric coverage with regards to his respiratory symptoms. Differential most notable for community acquired viral syndrome vs LRTI. He is stable.    - may continue empiric moxifloxacin and will complete 7 day course for possible bacterial LRTI  - would have CTSx see him to comment on need for thoracentesis/pleurex placement from their perspective -- if placed, please send fluid for counts/cx and standard studies  - await pending studies already in progress -- have added GI viral panel

## 2018-02-13 NOTE — ASSESSMENT & PLAN NOTE
S/P OHTx 11/8/14 (high risk); history of AMR treated with PP and IVIG 4/2016  -Currently stable hemodynamics  - Continue Prograf 3/3, Prednisone 2.5mg daily and Myfortic 720 BID  - Prograf goal 6-8. Level this AM at 6.7

## 2018-02-13 NOTE — SUBJECTIVE & OBJECTIVE
Interval History: Doing a bit better today with regards to cough, SOB, and diarrhea.     Review of Systems   Constitutional: Negative for unexpected weight change.   Eyes: Negative for visual disturbance.   Respiratory: Negative for cough and shortness of breath.    Cardiovascular: Negative for chest pain.   Gastrointestinal: Negative for abdominal pain and diarrhea.   Genitourinary: Negative for urgency.   Musculoskeletal: Negative for arthralgias.   Skin: Negative for wound.   Neurological: Negative for headaches.   Hematological: Does not bruise/bleed easily.   Psychiatric/Behavioral: Negative for sleep disturbance.     Objective:     Vital Signs (Most Recent):  Temp: 98.5 °F (36.9 °C) (02/13/18 1506)  Pulse: 99 (02/13/18 1506)  Resp: 18 (02/13/18 1506)  BP: (!) 96/51 (02/13/18 1506)  SpO2: (!) 92 % (02/13/18 1506) Vital Signs (24h Range):  Temp:  [98.5 °F (36.9 °C)-99.7 °F (37.6 °C)] 98.5 °F (36.9 °C)  Pulse:  [] 99  Resp:  [18] 18  SpO2:  [92 %-97 %] 92 %  BP: ()/(48-54) 96/51     Weight: 84.8 kg (186 lb 15.2 oz)  Body mass index is 29.28 kg/m².    Estimated Creatinine Clearance: 30.7 mL/min (based on SCr of 3.2 mg/dL (H)).    Physical Exam   Constitutional: He is oriented to person, place, and time. He appears well-developed and well-nourished. No distress.   HENT:   Head: Normocephalic and atraumatic.   Eyes: Pupils are equal, round, and reactive to light. No scleral icterus.   Neck: Neck supple. JVD present.   Cardiovascular: Regular rhythm.    No murmur heard.  Sinus Tachycardia (low 100s)   Pulmonary/Chest: Effort normal. No respiratory distress.   Decreased breath sounds in RLL. Scant crackles bilaterally    Abdominal: Soft. He exhibits no distension. There is no tenderness.   Musculoskeletal: He exhibits no edema.   Neurological: He is alert and oriented to person, place, and time.   Skin: Skin is warm and dry. Capillary refill takes 2 to 3 seconds.   Psychiatric: He has a normal mood and  affect. His behavior is normal. Judgment and thought content normal.   Vitals reviewed.      Significant Labs:   CBC:   Recent Labs  Lab 02/12/18  0410 02/13/18  0551   WBC 5.72 5.08   HGB 8.3* 8.2*   HCT 25.5* 25.4*    238     CMP:   Recent Labs  Lab 02/12/18  0410 02/12/18  0846 02/13/18  0551   * 135* 135*   K 5.3* 4.5 5.0   CL 99 100 99   CO2 24 24 23   * 133* 239*   BUN 39* 40* 43*   CREATININE 3.0* 2.7* 3.2*   CALCIUM 8.9 9.2 8.8   PROT 6.2  --  6.3   ALBUMIN 2.6*  --  2.5*   BILITOT 1.7*  --  1.1*   ALKPHOS 366*  --  418*   AST 21  --  20   ALT 16  --  15   ANIONGAP 12 11 13   EGFRNONAA 24.1* 27.4* 22.3*       Significant Imaging: I have reviewed all pertinent imaging results/findings within the past 24 hours.     CXR: Possibly slight decrease in the right pleural effusion, otherwise, grossly stable appearance of the chest radiograph.  No new large focal consolidation.     Microbiology:  2/11 blood cx: negative  2/11 sputum cx: NGTD  2/11 C.diff negative  2/11 stool cx: NGTD  2/11 Shiga toxi pending  2/11 O&P pending  2/12 RVP pending

## 2018-02-13 NOTE — PROGRESS NOTES
[facility]  Infectious Disease  Progress Note    Patient Name: Lv Crocker  MRN: [id number]  Admission Date: 2/11/2018  Length of Stay: 2 days  Attending Physician: Jose A Lloyd MD  Primary Care Provider: Philippe Morh MD    Isolation Status: No active isolations  Assessment/Plan:      * SOB (shortness of breath)    43yo man w/a history of ICM (s/p OHT 11/18/2014, CMV D+/R+, steroid induction, on maintenance tacro/MMF/pred; c/b early hemorrhagic tamponade requiring washout, delayed closure, and pericardial window and subsequent AF w/RVR and CACHORRO; late course c/b ACR and recurrent pleural effusions s/p VATS/pleurex catheter 1/18 and ascites requiring serial taps with pleurex removed on 2/7 due to decreasing drainage) who was admitted on 2/11/2018 with low-grade temps (100.5F), mild increase in SOB/dry cough since pleurex removal, and loose stools that is mildly improved on empiric coverage with regards to his respiratory symptoms. Differential most notable for community acquired viral syndrome vs bacterial LRTI. He is improved on empiric moxifloxacin and with supportive care.      - would complete a 7 day course of empiric moxifloxacin for possible bacterial LRTI  - pulmonary notes ok to hold on tap of effusion -- additional care deferred to CTSx  - as symptoms are improving, ID will sign off            Anticipated Disposition: per primary team    Thank you for your consult. I will sign off. Please contact us if you have any additional questions.     Maribell Eric MD  Transplant ID Attending  [phone]    Maribell Eric MD  Infectious Disease  [facility]    Subjective:     Principal Problem:SOB (shortness of breath)    HPI: No notes on file  Interval History: Doing a bit better today with regards to cough, SOB, and diarrhea.     Review of Systems   Constitutional: Negative for unexpected weight change.   Eyes: Negative for visual disturbance.   Respiratory:  Negative for cough and shortness of breath.    Cardiovascular: Negative for chest pain.   Gastrointestinal: Negative for abdominal pain and diarrhea.   Genitourinary: Negative for urgency.   Musculoskeletal: Negative for arthralgias.   Skin: Negative for wound.   Neurological: Negative for headaches.   Hematological: Does not bruise/bleed easily.   Psychiatric/Behavioral: Negative for sleep disturbance.     Objective:     Vital Signs (Most Recent):  Temp: 98.5 °F (36.9 °C) (02/13/18 1506)  Pulse: 99 (02/13/18 1506)  Resp: 18 (02/13/18 1506)  BP: (!) 96/51 (02/13/18 1506)  SpO2: (!) 92 % (02/13/18 1506) Vital Signs (24h Range):  Temp:  [98.5 °F (36.9 °C)-99.7 °F (37.6 °C)] 98.5 °F (36.9 °C)  Pulse:  [] 99  Resp:  [18] 18  SpO2:  [92 %-97 %] 92 %  BP: ()/(48-54) 96/51     Weight: 84.8 kg (186 lb 15.2 oz)  Body mass index is 29.28 kg/m².    Estimated Creatinine Clearance: 30.7 mL/min (based on SCr of 3.2 mg/dL (H)).    Physical Exam   Constitutional: He is oriented to person, place, and time. He appears well-developed and well-nourished. No distress.   HENT:   Head: Normocephalic and atraumatic.   Eyes: Pupils are equal, round, and reactive to light. No scleral icterus.   Neck: Neck supple. JVD present.   Cardiovascular: Regular rhythm.    No murmur heard.  Sinus Tachycardia (low 100s)   Pulmonary/Chest: Effort normal. No respiratory distress.   Decreased breath sounds in RLL. Scant crackles bilaterally    Abdominal: Soft. He exhibits no distension. There is no tenderness.   Musculoskeletal: He exhibits no edema.   Neurological: He is alert and oriented to person, place, and time.   Skin: Skin is warm and dry. Capillary refill takes 2 to 3 seconds.   Psychiatric: He has a normal mood and affect. His behavior is normal. Judgment and thought content normal.   Vitals reviewed.      Significant Labs:   CBC:   Recent Labs  Lab 02/12/18  0410 02/13/18  0551   WBC 5.72 5.08   HGB 8.3* 8.2*   HCT 25.5* 25.4*   PLT  249 238     CMP:   Recent Labs  Lab 02/12/18  0410 02/12/18  0846 02/13/18  0551   * 135* 135*   K 5.3* 4.5 5.0   CL 99 100 99   CO2 24 24 23   * 133* 239*   BUN 39* 40* 43*   CREATININE 3.0* 2.7* 3.2*   CALCIUM 8.9 9.2 8.8   PROT 6.2  --  6.3   ALBUMIN 2.6*  --  2.5*   BILITOT 1.7*  --  1.1*   ALKPHOS 366*  --  418*   AST 21  --  20   ALT 16  --  15   ANIONGAP 12 11 13   EGFRNONAA 24.1* 27.4* 22.3*       Significant Imaging: I have reviewed all pertinent imaging results/findings within the past 24 hours.     CXR: Possibly slight decrease in the right pleural effusion, otherwise, grossly stable appearance of the chest radiograph.  No new large focal consolidation.     Microbiology:  2/11 blood cx: negative  2/11 sputum cx: NGTD  2/11 C.diff negative  2/11 stool cx: NGTD  2/11 Shiga toxi pending  2/11 O&P pending  2/12 RVP pending

## 2018-02-13 NOTE — ASSESSMENT & PLAN NOTE
43yo man w/a history of ICM (s/p OHT 11/18/2014, CMV D+/R+, steroid induction, on maintenance tacro/MMF/pred; c/b early hemorrhagic tamponade requiring washout, delayed closure, and pericardial window and subsequent AF w/RVR and CACHORRO; late course c/b ACR and recurrent pleural effusions s/p VATS/pleurex catheter 1/18 and ascites requiring serial taps with pleurex removed on 2/7 due to decreasing drainage) who was admitted on 2/11/2018 with low-grade temps (100.5F), mild increase in SOB/dry cough since pleurex removal, and loose stools that is mildly improved on empiric coverage with regards to his respiratory symptoms. Differential most notable for community acquired viral syndrome vs bacterial LRTI. He is improved on empiric moxifloxacin and with supportive care.      - would complete a 7 day course of empiric moxifloxacin for possible bacterial LRTI  - pulmonary notes ok to hold on tap of effusion -- additional care deferred to CTSx  - as symptoms are improving, ID will sign off

## 2018-02-13 NOTE — PLAN OF CARE
Patient was hypoglycemic yesterday, late morning.  Patient noted that he was given 3u of insulin, but did not eat breakfast, citing that the food did not look appetizing. Tolerated lunch and dinner without issue.    Levemir 10u given as one time dose last night, upon request of patient.   Will monitor following BGs.    Plan:  Recommend Levemir 7u BID  Educated patient on importance of eating three meals daily, especially if he is given insulin.  Recommend diabetic diet.  Will continue to monitor    Deonna Larry MD  Endocrinology Fellow

## 2018-02-13 NOTE — PLAN OF CARE
Problem: Patient Care Overview  Goal: Plan of Care Review  Outcome: Ongoing (interventions implemented as appropriate)  Pt remains afebrile.  Blood sugar 168 last night.  Pt stated he would normally take 10 units of lantus instead of 15 units.  Dr Vance notified and ordered changed to 10 of levemir.  Will cont to monitor.

## 2018-02-13 NOTE — ASSESSMENT & PLAN NOTE
- Chronic recurrent pleural effusions in the setting of post-heart transplant constrictive cardiomyopathy.   - S/P Pleurodex recently discontinued chest tube due to improved out-put.   - No significant increase in the size of effusion compared to the last one  -Appreciate pulmonology evaluation; will follow up on CT chest without contrast.

## 2018-02-13 NOTE — NURSING
Called CT, [phone], [phone],  to inquire about patients appointment time.  No answer.  Will call back.

## 2018-02-13 NOTE — NURSING
Called BRYSON Martinez, [phone], to inquire about patient's appointment.  Patient can eat.  Patient's scan will be at 2000 or 2100 hours.

## 2018-02-13 NOTE — ASSESSMENT & PLAN NOTE
- Suspect atypical pneumonia/viral pneumonia.   -Bacterial work up negative  -Responding well to antibiotics; continue Moxifloxacin.   -

## 2018-02-13 NOTE — SUBJECTIVE & OBJECTIVE
Interval History: Patient reported well. Denied chest pain, shortness of breath   Reported that his diarrheal stool content is getting formed but still predominantly watery.   Frequency has improved.     Review of Systems   Constitution: Negative.   HENT: Negative.    Cardiovascular: Negative.    Skin: Negative.    Musculoskeletal: Negative.    Gastrointestinal: Negative.    Genitourinary: Negative.    Neurological: Negative.    Psychiatric/Behavioral: Negative.      Objective:     Vital Signs (Most Recent):  Temp: 99 °F (37.2 °C) (02/13/18 1122)  Pulse: 102 (02/13/18 1200)  Resp: 18 (02/13/18 1122)  BP: (!) 98/51 (02/13/18 1122)  SpO2: 97 % (02/13/18 1122) Vital Signs (24h Range):  Temp:  [98.5 °F (36.9 °C)-99.7 °F (37.6 °C)] 99 °F (37.2 °C)  Pulse:  [100-116] 102  Resp:  [18] 18  SpO2:  [93 %-97 %] 97 %  BP: ()/(48-54) 98/51     Weight: 84.8 kg (186 lb 15.2 oz)  Body mass index is 29.28 kg/m².     SpO2: 97 %  O2 Device (Oxygen Therapy): room air      Intake/Output Summary (Last 24 hours) at 02/13/18 1411  Last data filed at 02/13/18 1149   Gross per 24 hour   Intake              420 ml   Output              400 ml   Net               20 ml       Lines/Drains/Airways     Peripheral Intravenous Line                 Peripheral IV - Single Lumen 02/11/18 1852 Hand 1 day                Physical Exam   Constitutional: He is oriented to person, place, and time. He appears well-developed and well-nourished. No distress.   HENT:   Head: Normocephalic and atraumatic.   Mouth/Throat: Oropharynx is clear and moist.   Eyes: Conjunctivae and EOM are normal. Pupils are equal, round, and reactive to light. Right eye exhibits no discharge. Left eye exhibits no discharge. No scleral icterus.   Neck: Normal range of motion. Neck supple. No JVD present. No tracheal deviation present. No thyromegaly present.   Cardiovascular: Normal rate, regular rhythm and intact distal pulses.    Pulmonary/Chest: Effort normal and breath  sounds normal. No stridor. No respiratory distress. He has no rales. He exhibits no tenderness.   Decreased breath sounds on the right lower base    Abdominal: Soft. Bowel sounds are normal. He exhibits no distension and no mass. There is no rebound.   Musculoskeletal: Normal range of motion. He exhibits no edema or tenderness.   Lymphadenopathy:     He has no cervical adenopathy.   Neurological: He is alert and oriented to person, place, and time. He has normal reflexes. He displays normal reflexes. No cranial nerve deficit. He exhibits normal muscle tone. Coordination normal.   Skin: Skin is warm and dry. He is not diaphoretic.   Psychiatric: He has a normal mood and affect. His behavior is normal. Judgment normal.   Nursing note and vitals reviewed.      Significant Labs:   CMP   Recent Labs  Lab 02/12/18  0410 02/12/18  0846 02/13/18  0551   * 135* 135*   K 5.3* 4.5 5.0   CL 99 100 99   CO2 24 24 23   * 133* 239*   BUN 39* 40* 43*   CREATININE 3.0* 2.7* 3.2*   CALCIUM 8.9 9.2 8.8   PROT 6.2  --  6.3   ALBUMIN 2.6*  --  2.5*   BILITOT 1.7*  --  1.1*   ALKPHOS 366*  --  418*   AST 21  --  20   ALT 16  --  15   ANIONGAP 12 11 13   ESTGFRAFRICA 27.9* 31.7* 25.8*   EGFRNONAA 24.1* 27.4* 22.3*   , CBC   Recent Labs  Lab 02/12/18  0410 02/13/18  0551   WBC 5.72 5.08   HGB 8.3* 8.2*   HCT 25.5* 25.4*    238    and INR No results for input(s): INR, PROTIME in the last 48 hours.    Significant Imaging: X-Ray: CXR: X-Ray Chest 1 View (CXR): No results found for this visit on 02/11/18.

## 2018-02-13 NOTE — PLAN OF CARE
Problem: Patient Care Overview  Goal: Plan of Care Review  Reviewed plan of care with patient.  Misc sendout test stool ordered.  Patient will remain free of falls (Fall Bundle Implemented), trauma/injury by using appropriate lighting, nonskid socks, and by keeping area free of debris.  Patient verbalized understanding of all instructions.

## 2018-02-13 NOTE — NURSING
Placed labeled stool specimen cup in bathroom and notified patient and spouse that stool sample is needed.

## 2018-02-13 NOTE — PROGRESS NOTES
[facility]  Cardiology  Progress Note    Patient Name: Lv Crocker  MRN: [id number]  Admission Date: 2/11/2018  Hospital Length of Stay: 2 days  Code Status: Full Code   Attending Physician: JoseA Lloyd MD   Primary Care Physician: Philippe Mohr MD  Expected Discharge Date: 2/15/2018  Principal Problem:SOB (shortness of breath)    Subjective:     Hospital Course:   No notes on file    Interval History: Patient reported well. Denied chest pain, shortness of breath   Reported that his diarrheal stool content is getting formed but still predominantly watery.   Frequency has improved.     Review of Systems   Constitution: Negative.   HENT: Negative.    Cardiovascular: Negative.    Skin: Negative.    Musculoskeletal: Negative.    Gastrointestinal: Negative.    Genitourinary: Negative.    Neurological: Negative.    Psychiatric/Behavioral: Negative.      Objective:     Vital Signs (Most Recent):  Temp: 99 °F (37.2 °C) (02/13/18 1122)  Pulse: 102 (02/13/18 1200)  Resp: 18 (02/13/18 1122)  BP: (!) 98/51 (02/13/18 1122)  SpO2: 97 % (02/13/18 1122) Vital Signs (24h Range):  Temp:  [98.5 °F (36.9 °C)-99.7 °F (37.6 °C)] 99 °F (37.2 °C)  Pulse:  [100-116] 102  Resp:  [18] 18  SpO2:  [93 %-97 %] 97 %  BP: ()/(48-54) 98/51     Weight: 84.8 kg (186 lb 15.2 oz)  Body mass index is 29.28 kg/m².     SpO2: 97 %  O2 Device (Oxygen Therapy): room air      Intake/Output Summary (Last 24 hours) at 02/13/18 1411  Last data filed at 02/13/18 1149   Gross per 24 hour   Intake              420 ml   Output              400 ml   Net               20 ml       Lines/Drains/Airways     Peripheral Intravenous Line                 Peripheral IV - Single Lumen 02/11/18 1852 Hand 1 day                Physical Exam   Constitutional: He is oriented to person, place, and time. He appears well-developed and well-nourished. No distress.   HENT:   Head: Normocephalic and atraumatic.   Mouth/Throat: Oropharynx is clear  and moist.   Eyes: Conjunctivae and EOM are normal. Pupils are equal, round, and reactive to light. Right eye exhibits no discharge. Left eye exhibits no discharge. No scleral icterus.   Neck: Normal range of motion. Neck supple. No JVD present. No tracheal deviation present. No thyromegaly present.   Cardiovascular: Normal rate, regular rhythm and intact distal pulses.    Pulmonary/Chest: Effort normal and breath sounds normal. No stridor. No respiratory distress. He has no rales. He exhibits no tenderness.   Decreased breath sounds on the right lower base    Abdominal: Soft. Bowel sounds are normal. He exhibits no distension and no mass. There is no rebound.   Musculoskeletal: Normal range of motion. He exhibits no edema or tenderness.   Lymphadenopathy:     He has no cervical adenopathy.   Neurological: He is alert and oriented to person, place, and time. He has normal reflexes. He displays normal reflexes. No cranial nerve deficit. He exhibits normal muscle tone. Coordination normal.   Skin: Skin is warm and dry. He is not diaphoretic.   Psychiatric: He has a normal mood and affect. His behavior is normal. Judgment normal.   Nursing note and vitals reviewed.      Significant Labs:   CMP   Recent Labs  Lab 02/12/18 0410 02/12/18  0846 02/13/18  0551   * 135* 135*   K 5.3* 4.5 5.0   CL 99 100 99   CO2 24 24 23   * 133* 239*   BUN 39* 40* 43*   CREATININE 3.0* 2.7* 3.2*   CALCIUM 8.9 9.2 8.8   PROT 6.2  --  6.3   ALBUMIN 2.6*  --  2.5*   BILITOT 1.7*  --  1.1*   ALKPHOS 366*  --  418*   AST 21  --  20   ALT 16  --  15   ANIONGAP 12 11 13   ESTGFRAFRICA 27.9* 31.7* 25.8*   EGFRNONAA 24.1* 27.4* 22.3*   , CBC   Recent Labs  Lab 02/12/18 0410 02/13/18  0551   WBC 5.72 5.08   HGB 8.3* 8.2*   HCT 25.5* 25.4*    238    and INR No results for input(s): INR, PROTIME in the last 48 hours.    Significant Imaging: X-Ray: CXR: X-Ray Chest 1 View (CXR): No results found for this visit on  02/11/18.    Assessment and Plan:         * SOB (shortness of breath)    - Suspect atypical pneumonia/viral pneumonia.   -Bacterial work up negative  -Responding well to antibiotics; continue Moxifloxacin.   -         Diarrhea    - Improved frequency with consistency still watery  - Will continue to monitor in hospital  - Transplant ID consulted already         Pleural effusion    - Chronic recurrent pleural effusions in the setting of post-heart transplant constrictive cardiomyopathy.   - S/P Pleurodex recently discontinued chest tube due to improved out-put.   - No significant increase in the size of effusion compared to the last one  -Appreciate pulmonology evaluation; will follow up on CT chest without contrast.         Type 1 diabetes mellitus with stage 3 chronic kidney disease    - Continue current regimen on basal with levimir at 15 U QHS and boluses with SSI   - Neuropathy with controlled symptom while on Gabapentin and Nor-tryptlyine.         Chronic kidney disease (CKD), stage III (moderate)    -Non-Oliguric.   -Fluctuating creatinine; this AM at 3.2  -Follow the trend, and avoid dehydration and nephrotoxic drug exposure.         Heart transplanted    S/P OHTx 11/8/14 (high risk); history of AMR treated with PP and IVIG 4/2016  -Currently stable hemodynamics  - Continue Prograf 3/3, Prednisone 2.5mg daily and Myfortic 720 BID  - Prograf goal 6-8. Level this AM at 6.7            VTE Risk Mitigation         Ordered     heparin (porcine) injection 5,000 Units  Every 8 hours     Route:  Subcutaneous        02/11/18 1351     Medium Risk of VTE  Once      02/11/18 1351          Gage Goodman MD  Cardiology  [facility]

## 2018-02-13 NOTE — ASSESSMENT & PLAN NOTE
-Non-Oliguric.   -Fluctuating creatinine; this AM at 3.2  -Follow the trend, and avoid dehydration and nephrotoxic drug exposure.

## 2018-02-13 NOTE — ASSESSMENT & PLAN NOTE
- Continue current regimen on basal with levimir at 15 U QHS and boluses with SSI   - Neuropathy with controlled symptom while on Gabapentin and Nor-tryptlyine.

## 2018-02-14 PROBLEM — R06.02 SOB (SHORTNESS OF BREATH): Status: RESOLVED | Noted: 2018-01-01 | Resolved: 2018-01-01

## 2018-02-14 NOTE — ASSESSMENT & PLAN NOTE
-S/P OHTx 11/8/14 (high risk); history of AMR treated with PP and IVIG 4/2016  -Currently stable hemodynamics  -Continue Prograf 3/3, Prednisone 2.5mg daily and Myfortic 720 BID  -Prograf goal 6-8, level this AM at 6.9

## 2018-02-14 NOTE — CONSULTS
"Radiology Consult    Lv Crocker is a 42 y.o. male s/p heart transplant on 11/8/2014 with recurrent pleural effusions requiring serial thoracentesis. Pt underwent VATS with Pleurex catheter placement on 1/11/2018 which was removed on 2/7/2018 due to dysfunction. After removal of catheter, patient has experienced recurrent effusion with SOB and fever. IR consulted for diagnostic thoracentesis prior to consideration for repeat VATS procedure.    Past Medical History:   Diagnosis Date    Arthritis     Ascites     Thought to be 2/2 heart tpx; liver bx 3/31/17 w/o significant fibrosis    Cardiomyopathy     Depression     Controlled "for the most part" on Lexipro    Encounter for blood transfusion     during transplant    GERD (gastroesophageal reflux disease)     Hashimoto's disease     History of CHF (congestive heart failure)     Previously EF 20% (prior to heart transplant); last EF 60%, PAP 41 as of 12/12/17; throught to be 2/2 genetics & DM1    History of coronary artery disease     H/o Coronary artery disease; resolved since heart transplant 2014    History of myocardial infarction     h/o MI x3 prior to heart transplant    HLD (hyperlipidemia) 6/11/2015    Hypoglycemia unawareness in type 1 diabetes mellitus     Hypothyroid     Initially hyperthyroid 2/2 Hoshimoto's thyroiditis; now on levothyroxine 150 mcg qd    Pleural effusion due to another disorder     Thought to be 2/2 heart tpx; s/p PleuRx catheter placement and removal after 1-2 months    Pulmonary hypertension assoc with unclear multi-factorial mechanisms 12/12/2017    PAP 41 (EF 60%) on ECHO 12/12/17    Syncope 6/30/2015    Type I diabetes mellitus, well controlled     Well controlled; Dx'd @7y/o; on N insulin 20U BID & Humalog 10U w/meals +SSI; Glu 89 11/9/17; A1c 7.2% 4/5/17    Unspecified essential hypertension 05/29/2014    Well controlled; Last /57 on 11/9/17     Past Surgical History:   Procedure Laterality Date "    CARDIAC CATHETERIZATION      CARDIAC SURGERY      CHOLECYSTECTOMY      CORONARY ANGIOPLASTY      FOOT SPLIT TRANSFER OF THE POSTERIOR TIBIALIS TENDON PROCEDURE      HEART TRANSPLANT  2014    KNEE SURGERY      PleuRx catheter placement  01/11/2018    Plan for removal after 1-2 months by Dr. James in cardiothoracic surgery    s/p oht  November 2014    stent placemnet         Discussed with primary team, Dr. Ferreira.    Imaging reviewed with Radiology staff, Dr. East.     Procedure: Thoracentesis    Scheduled Meds:    aspirin  81 mg Oral Daily    escitalopram oxalate  20 mg Oral QAM    gabapentin  900 mg Oral TID    heparin (porcine)  5,000 Units Subcutaneous Q8H    insulin aspart  4 Units Subcutaneous TIDWM    insulin detemir  8 Units Subcutaneous BID    levothyroxine  125 mcg Oral QAM    magnesium oxide  400 mg Oral Daily    moxifloxacin  400 mg Oral Daily    mycophenolate  720 mg Oral BID    nortriptyline  25 mg Oral QHS    pantoprazole  40 mg Oral Daily    pravastatin  40 mg Oral Daily    predniSONE  2.5 mg Oral Daily    sodium chloride 0.9%  3 mL Intravenous Q8H    tacrolimus  3 mg Oral BID    tamsulosin  0.8 mg Oral QHS    torsemide  40 mg Oral Daily     Continuous Infusions:   PRN Meds:dextrose 50%, dextrose 50%, glucagon (human recombinant), glucose, glucose, insulin aspart, ondansetron    Allergies:   Review of patient's allergies indicates:   Allergen Reactions    No known drug allergies        Labs:    Recent Labs  Lab 02/11/18  1113   INR 1.0       Recent Labs  Lab 02/14/18  0544   WBC 5.16   HGB 7.9*   HCT 23.5*   MCV 91         Recent Labs  Lab 02/14/18  0544   *   *   K 4.9   CL 97   CO2 24   BUN 51*   CREATININE 3.5*   CALCIUM 7.9*   MG 1.9   ALT 16   AST 21   ALBUMIN 2.5*   BILITOT 1.2*         Vitals (Most Recent):  Temp: 98.4 °F (36.9 °C) (02/14/18 1200)  Pulse: 100 (02/14/18 1200)  Resp: 16 (02/14/18 1200)  BP: (!) 104/51 (02/14/18  1200)  SpO2: (!) 92 % (02/14/18 1200)    Plan:   1. Thoracentesis scheduled for 2/14/2016.  2. Hold anticoagulation at this time.    Delvis Christianson MD  PGY-III  Dept of Radiology   Pager: [phone]

## 2018-02-14 NOTE — PROGRESS NOTES
Pulmonary Plan of Care Note      Pt seen on rounds this AM. Clinically improved with resolution of diarrhea. Still has a cough. CT chest reviewed. Chronic pleural changes + pleural fluid on the right + atelectasis + interlobular septal thickening. Would discuss with CTS since they had wanted to re-evaluate him based on clinic note from 2/7/18. Discussed with the primary team.      Nay Castorena MD  Pulmonary/Critical Care Fellow  [phone]

## 2018-02-14 NOTE — SUBJECTIVE & OBJECTIVE
"Interval HPI:   Overnight events:  AFVSS. Tolerating diet.  AM glucose 235, with elevated BG last night.  Overall, feels improved. No complaints.    BP (!) 103/52 (BP Location: Right arm, Patient Position: Lying)   Pulse 104   Temp 98.9 °F (37.2 °C) (Oral)   Resp 16   Ht 5' 7" (1.702 m)   Wt 85 kg (187 lb 6.3 oz)   SpO2 (!) 92%   BMI 29.35 kg/m²     Labs Reviewed and Include      Recent Labs  Lab 02/14/18  0544   *   CALCIUM 7.9*   ALBUMIN 2.5*   PROT 6.1   *   K 4.9   CO2 24   CL 97   BUN 51*   CREATININE 3.5*   ALKPHOS 470*   ALT 16   AST 21   BILITOT 1.2*     Lab Results   Component Value Date    WBC 5.16 02/14/2018    HGB 7.9 (L) 02/14/2018    HCT 23.5 (L) 02/14/2018    MCV 91 02/14/2018     02/14/2018       Recent Labs  Lab 02/11/18  1113   TSH 0.082*   FREET4 1.55*     Lab Results   Component Value Date    HGBA1C 6.9 (H) 02/11/2018       Nutritional status:   Body mass index is 29.35 kg/m².  Lab Results   Component Value Date    ALBUMIN 2.5 (L) 02/14/2018    ALBUMIN 2.5 (L) 02/13/2018    ALBUMIN 2.6 (L) 02/12/2018     Lab Results   Component Value Date    PREALBUMIN 18 (L) 03/24/2015    PREALBUMIN 19 (L) 12/17/2014    PREALBUMIN 24 12/05/2014       Estimated Creatinine Clearance: 28.1 mL/min (based on SCr of 3.5 mg/dL (H)).    Accu-Checks  Recent Labs      02/12/18   1624  02/12/18   1802  02/12/18   2120  02/13/18   0742  02/13/18   0836  02/13/18   1233  02/13/18   1649  02/13/18   2103  02/14/18   0307  02/14/18   0801   POCTGLUCOSE  220*  220*  168*  229*  196*  134*  212*  229*  309*  235*       Current Medications and/or Treatments Impacting Glycemic Control  Immunotherapy:  Immunosuppressants         Stop Route Frequency     mycophenolate EC tablet 720 mg      -- Oral 2 times daily     tacrolimus capsule 3 mg      -- Oral 2 times daily        Steroids:   Hormones     Start     Stop Route Frequency Ordered    02/12/18 0900  predniSONE tablet 2.5 mg      -- Oral Daily 02/11/18 " 1350        Pressors:    Autonomic Drugs     None        Hyperglycemia/Diabetes Medications: Antihyperglycemics     Start     Stop Route Frequency Ordered    02/14/18 1130  insulin aspart pen 4 Units      -- SubQ 3 times daily with meals 02/14/18 0951    02/14/18 1000  insulin detemir pen 8 Units      -- SubQ 2 times daily 02/14/18 0951    02/11/18 1505  insulin aspart pen 1-10 Units      -- SubQ Before meals & nightly PRN 02/11/18 1405

## 2018-02-14 NOTE — ASSESSMENT & PLAN NOTE
Patient has been on LT4 chronically, without any recent dose adjustment.    Current illness requiring hospitalization (febrile, HoTN, tachycardic).  Denies s/sx of hyperthyroidism.     Pt denies h/o recent exposure to  IV contrast, amiodarone or lithium.  Reports no ocular symptoms.    Likely that these abnormal labs represent euthyroidal illness.    LT4 was decreased to 125mcg per primary. Okay to continue dose.    Plan to repeat FT4 in one week.  Full TFTs in 4-6 weeks.

## 2018-02-14 NOTE — NURSING
Patient complained that he was feeling nauseated and that his mouth was dry. He verbalized that his sugar may be elevated. Checked his sugar and it read 309. Notified Dr. GILES Vance Md. Ordered to give PRN zofran 8mg PO and 2 units of novolog. Will administer and continue to monitor.

## 2018-02-14 NOTE — H&P
"Inpatient Radiology Pre-procedure Note    History of Present Illness:  Lv Crocker is a 42 y.o. male with moderate right pleural effusion who presents for US guided thoracentesis.  Admission H&P reviewed.  Past Medical History:   Diagnosis Date    Arthritis     Ascites     Thought to be 2/2 heart tpx; liver bx 3/31/17 w/o significant fibrosis    Cardiomyopathy     Depression     Controlled "for the most part" on Lexipro    Encounter for blood transfusion     during transplant    GERD (gastroesophageal reflux disease)     Hashimoto's disease     History of CHF (congestive heart failure)     Previously EF 20% (prior to heart transplant); last EF 60%, PAP 41 as of 12/12/17; throught to be 2/2 genetics & DM1    History of coronary artery disease     H/o Coronary artery disease; resolved since heart transplant 2014    History of myocardial infarction     h/o MI x3 prior to heart transplant    HLD (hyperlipidemia) 6/11/2015    Hypoglycemia unawareness in type 1 diabetes mellitus     Hypothyroid     Initially hyperthyroid 2/2 Hoshimoto's thyroiditis; now on levothyroxine 150 mcg qd    Pleural effusion due to another disorder     Thought to be 2/2 heart tpx; s/p PleuRx catheter placement and removal after 1-2 months    Pulmonary hypertension assoc with unclear multi-factorial mechanisms 12/12/2017    PAP 41 (EF 60%) on ECHO 12/12/17    Syncope 6/30/2015    Type I diabetes mellitus, well controlled     Well controlled; Dx'd @7y/o; on N insulin 20U BID & Humalog 10U w/meals +SSI; Glu 89 11/9/17; A1c 7.2% 4/5/17    Unspecified essential hypertension 05/29/2014    Well controlled; Last /57 on 11/9/17     Past Surgical History:   Procedure Laterality Date    CARDIAC CATHETERIZATION      CARDIAC SURGERY      CHOLECYSTECTOMY      CORONARY ANGIOPLASTY      FOOT SPLIT TRANSFER OF THE POSTERIOR TIBIALIS TENDON PROCEDURE      HEART TRANSPLANT  2014    KNEE SURGERY      PleuRx catheter " "placement  01/11/2018    Plan for removal after 1-2 months by Dr. James in cardiothoracic surgery    s/p oht  November 2014    stent placemnet         Review of Systems:   As documented in primary team H&P    Home Meds:   Prior to Admission medications    Medication Sig Start Date End Date Taking? Authorizing Provider   aspirin (ECOTRIN) 81 MG EC tablet Take 1 tablet (81 mg total) by mouth once daily. 12/11/14  Yes Chandni Fernandez MD   escitalopram oxalate (LEXAPRO) 20 MG tablet Take 1 tablet (20 mg total) by mouth once daily.  Patient taking differently: Take 20 mg by mouth every morning.  12/23/16  Yes Chandni Fernandez MD   gabapentin (NEURONTIN) 300 MG capsule Take 3 capsules (900 mg total) by mouth 3 (three) times daily. 5/3/17  Yes Chandni Fernandez MD   insulin NPH (NOVOLIN N) 100 unit/mL injection Inject 20 Units into the skin 2 (two) times daily before meals. 11/30/17 11/30/18 Yes Kamala Jo, NP   lancets Misc 1 Device by Misc.(Non-Drug; Combo Route) route 4 (four) times daily with meals and nightly. 12/24/14  Yes JOSE DANIEL Haskins   levothyroxine (SYNTHROID) 150 MCG tablet Take 1 tablet (150 mcg total) by mouth every morning. 5/31/17  Yes Monica Fernandez, RA, NP   magnesium oxide (MAG-OX) 400 mg tablet Take 1 tablet (400 mg total) by mouth once daily. 12/20/16  Yes Chandni Fernandez MD   mycophenolate (MYFORTIC) 180 MG TbEC Take 4 tablets (720 mg total) by mouth 2 (two) times daily. S/P Heart Transplant 11/18/2014 ICD-10 Z94.1 10/20/16  Yes Jose A Lloyd MD   nortriptyline (PAMELOR) 25 MG capsule Take 1 capsule (25 mg total) by mouth every evening. 4/27/17  Yes Kanika Ferreira MD   ondansetron (ZOFRAN-ODT) 4 MG TbDL Take 2 tablets (8 mg total) by mouth every 8 (eight) hours as needed (nausea). 6/21/17  Yes Philippe Mohr MD   pantoprazole (PROTONIX) 40 MG tablet TAKE ONE TABLET BY MOUTH EVERY DAY 8/15/16  Yes Tim Mao NP   pen needle, diabetic 32 gauge x 5/32" Ndle " Uses 5 pen needles a day 4/5/17  Yes Monica Fernandez DNP, NP   pravastatin (PRAVACHOL) 40 MG tablet Take 1 tablet (40 mg total) by mouth every evening.  Patient taking differently: Take 40 mg by mouth once daily.  4/11/17  Yes Alexandr Hernández MD   predniSONE (DELTASONE) 2.5 MG tablet Take 1 tablet (2.5 mg total) by mouth once daily. S/P Heart Transplant 11/18/2014 ICD-10 Z94.1 1/10/18  Yes Alexandr Hernández MD   tamsulosin (FLOMAX) 0.4 mg Cp24 TAKE 2 CAPSULES(0.8 MG) BY MOUTH EVERY EVENING 2/7/18  Yes Alexandr Hernández MD   torsemide (DEMADEX) 20 MG Tab Take 2 tablets (40 mg total) by mouth once daily. 1/3/18 1/3/19 Yes Alexandr Hernández MD   tacrolimus (PROGRAF) 1 MG Cap Taked 3mg orally in the morning and 3mg orally in the evening. S/p heart transplant  11/18/2014  ICD-10 Z94.1 12/4/17 2/14/18 Yes Kanika Ferreira MD   insulin aspart (NOVOLOG) 100 unit/mL InPn pen Inject 3 Units into the skin 3 (three) times daily. 2/14/18 2/14/19  Gage Goodman MD   insulin detemir (LEVEMIR FLEXTOUCH) 100 unit/mL (3 mL) SubQ InPn pen Inject 15 Units into the skin every evening. 2/14/18 2/14/19  Gage Goodman MD   moxifloxacin (AVELOX) 400 mg tablet Take 1 tablet (400 mg total) by mouth once daily. 2/15/18   Gage Goodman MD   tacrolimus (PROGRAF) 1 MG Cap Taked 3mg orally in the morning and 3mg orally in the evening. S/p heart transplant  11/18/2014  ICD-10 Z94.1 2/14/18   Gaeg Goodman MD   glucagon (human recombinant) inj 1mg/mL kit Inject 1 mL (1 mg total) into the muscle as needed. 11/30/17 2/14/18  Kamala Jo, NETTIE   oxyCODONE-acetaminophen (PERCOCET) 5-325 mg per tablet Take 1 tablet by mouth every 4 (four) hours as needed for Pain. 1/12/18 2/14/18  JOSE DANIEL Blackwell     Scheduled Meds:    aspirin  81 mg Oral Daily    escitalopram oxalate  20 mg Oral QAM    gabapentin  900 mg Oral TID    heparin (porcine)  5,000 Units Subcutaneous Q8H    insulin aspart  4 Units Subcutaneous TIDWM    insulin  detemir  8 Units Subcutaneous BID    levothyroxine  125 mcg Oral QAM    magnesium oxide  400 mg Oral Daily    moxifloxacin  400 mg Oral Daily    mycophenolate  720 mg Oral BID    nortriptyline  25 mg Oral QHS    pantoprazole  40 mg Oral Daily    pravastatin  40 mg Oral Daily    predniSONE  2.5 mg Oral Daily    sodium chloride 0.9%  3 mL Intravenous Q8H    tacrolimus  3 mg Oral BID    tamsulosin  0.8 mg Oral QHS    torsemide  40 mg Oral Daily     Continuous Infusions:   PRN Meds:dextrose 50%, dextrose 50%, glucagon (human recombinant), glucose, glucose, insulin aspart, ondansetron  Anticoagulants/Antiplatelets: ASA 81, heparin last taken 6 AM    Allergies:   Review of patient's allergies indicates:   Allergen Reactions    No known drug allergies      Sedation Hx: have not been any systemic reactions    Labs:    Recent Labs  Lab 02/11/18  1113   INR 1.0       Recent Labs  Lab 02/14/18  0544   WBC 5.16   HGB 7.9*   HCT 23.5*   MCV 91         Recent Labs  Lab 02/14/18  0544   *   *   K 4.9   CL 97   CO2 24   BUN 51*   CREATININE 3.5*   CALCIUM 7.9*   MG 1.9   ALT 16   AST 21   ALBUMIN 2.5*   BILITOT 1.2*         Vitals:  Temp: 98.4 °F (36.9 °C) (02/14/18 1200)  Pulse: 99 (02/14/18 1321)  Resp: 16 (02/14/18 1321)  BP: (!) 94/55 (02/14/18 1321)  SpO2: (!) 93 % (02/14/18 1321)     Physical Exam:    General: no acute distress  Mental Status: alert and oriented to person, place and time  HEENT: normocephalic, atraumatic  Chest: unlabored breathing  Abdomen: nondistended  Extremity: moves all extremities    Plan: Right thoracentesis.   Sedation Plan: Local anesthesia.     Telly LEVIN-4  Pager: [phone]

## 2018-02-14 NOTE — PROGRESS NOTES
Thoracentesis complete, pt tolerates well.  50cc fluid removed, sent for labs per order.  Drsg to site c/d/i. Report called to Jovanna CHRISTINA.    Xray at bedside, pt awaiting transport back to room.

## 2018-02-14 NOTE — PROGRESS NOTES
"[facility]  Endocrinology  Progress Note    Admit Date: 2/11/2018     Reason for Consult:  Abnormal thyroid labs - concerning for over medication. Recommendation on dose adjustment v additional workup     Surgical Procedure and Date: s/p heart transplant 2014    Diabetes diagnosis year: 7yo (T1DM)    Home Diabetes Medications:    NPH 20u qAM and 18u qPM  Regular insulin 10u AC    How often checking glucose at home? 4 times daily   BG readings on regimen: 150-200s  Hypoglycemia on the regimen?  Yes, rarely - treats appropriately (light snack, glucose tab)  Missed doses on regimen?  No      Diabetes Complications include:     Hyperglycemia, Hypoglycemia  and Diabetic chronic kidney disease         Complicating diabetes co morbidities:   N/A    HPI:   Patient is a 42 y.o. male with a diagnosis of T1DM, HTN, hypothyroidism, s/p heart transplant.  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 He required serial paracentesis and thoracentesis until he underwent a VATS with pleurX catheter placement on 1/11/2018 and removed 2/7/18.       Since removal of cath, patient has had low grade fever, diarrhea, and SOB.  Upon initial labs, TSH was decreased (0.082) and free T4 1.55.  Endocrinology consulted to assist in management of these labs.  He was last seen in clinic by Kamala Vázquez NP 11/2017.  Insulin regimen adjusted and LT4 continued at usual dose of 150mcg.        Interval HPI:   Overnight events:  AFVSS. Tolerating diet.  AM glucose 235, with elevated BG last night.  Overall, feels improved. No complaints.    BP (!) 103/52 (BP Location: Right arm, Patient Position: Lying)   Pulse 104   Temp 98.9 °F (37.2 °C) (Oral)   Resp 16   Ht 5' 7" (1.702 m)   Wt 85 kg (187 lb 6.3 oz)   SpO2 (!) 92%   BMI 29.35 kg/m²       Labs Reviewed and Include      Recent Labs  Lab 02/14/18  0544   * "   CALCIUM 7.9*   ALBUMIN 2.5*   PROT 6.1   *   K 4.9   CO2 24   CL 97   BUN 51*   CREATININE 3.5*   ALKPHOS 470*   ALT 16   AST 21   BILITOT 1.2*     Lab Results   Component Value Date    WBC 5.16 02/14/2018    HGB 7.9 (L) 02/14/2018    HCT 23.5 (L) 02/14/2018    MCV 91 02/14/2018     02/14/2018       Recent Labs  Lab 02/11/18  1113   TSH 0.082*   FREET4 1.55*     Lab Results   Component Value Date    HGBA1C 6.9 (H) 02/11/2018       Nutritional status:   Body mass index is 29.35 kg/m².  Lab Results   Component Value Date    ALBUMIN 2.5 (L) 02/14/2018    ALBUMIN 2.5 (L) 02/13/2018    ALBUMIN 2.6 (L) 02/12/2018     Lab Results   Component Value Date    PREALBUMIN 18 (L) 03/24/2015    PREALBUMIN 19 (L) 12/17/2014    PREALBUMIN 24 12/05/2014       Estimated Creatinine Clearance: 28.1 mL/min (based on SCr of 3.5 mg/dL (H)).    Accu-Checks  Recent Labs      02/12/18   1624  02/12/18   1802  02/12/18   2120  02/13/18   0742  02/13/18   0836  02/13/18   1233  02/13/18   1649  02/13/18   2103  02/14/18   0307  02/14/18   0801   POCTGLUCOSE  220*  220*  168*  229*  196*  134*  212*  229*  309*  235*       Current Medications and/or Treatments Impacting Glycemic Control  Immunotherapy:  Immunosuppressants         Stop Route Frequency     mycophenolate EC tablet 720 mg      -- Oral 2 times daily     tacrolimus capsule 3 mg      -- Oral 2 times daily        Steroids:   Hormones     Start     Stop Route Frequency Ordered    02/12/18 0900  predniSONE tablet 2.5 mg      -- Oral Daily 02/11/18 1350        Pressors:    Autonomic Drugs     None        Hyperglycemia/Diabetes Medications: Antihyperglycemics     Start     Stop Route Frequency Ordered    02/14/18 1130  insulin aspart pen 4 Units      -- SubQ 3 times daily with meals 02/14/18 0951    02/14/18 1000  insulin detemir pen 8 Units      -- SubQ 2 times daily 02/14/18 0951    02/11/18 1505  insulin aspart pen 1-10 Units      -- SubQ Before meals & nightly PRN  02/11/18 1405          ASSESSMENT and PLAN    Type 1 diabetes mellitus with stage 3 chronic kidney disease    BG goal 140-180  A1C 6.9%    Above goal.  Levemir was not split.     Recommend Levemir 8u BID, increase Novolog to 4u AC.  Continue SSI with POCT BG AC/HS/0200  Recommend diabetic diet.  Will continue to monitor     Discharge planning:  Patient is well educated and knows his DM regimen in outpatient setting.  Okay to discharge patient on home regimen of NPH 10u BID and regular insulin 5u AC.  Patient due for Endocrinology follow up this month.  Will schedule discharge follow up 1-2 weeks s/p discharge with NETTIE Vázquez.        Hypothyroidism due to Hashimoto's thyroiditis    Patient has been on LT4 chronically, without any recent dose adjustment.    Current illness requiring hospitalization (febrile, HoTN, tachycardic).  Denies s/sx of hyperthyroidism.     Pt denies h/o recent exposure to  IV contrast, amiodarone or lithium.  Reports no ocular symptoms.    Likely that these abnormal labs represent euthyroidal illness.    LT4 was decreased to 125mcg per primary. Okay to continue dose.    Plan to repeat FT4 in one week.  Full TFTs in 4-6 weeks.        Chronic kidney disease (CKD), stage III (moderate)    Lab Results   Component Value Date    CREATININE 3.5 (H) 02/14/2018     Avoid insulin stacking.          Heart transplanted    Avoid hypoglycemia            Deonna Larry MD  Endocrinology  [facility]

## 2018-02-14 NOTE — PLAN OF CARE
Problem: Patient Care Overview  Goal: Plan of Care Review  Outcome: Ongoing (interventions implemented as appropriate)  Patient remained free from falls/injury/trauma throughout shift. No complaints of chest pain, SOB, or discomfort. VSS. . Provided 15 units of levemir and 2 units novolog. Patient ordered a CT scan w/o contrast. CT called around 0135. Informed and asked patient if he would like to go at this time, and he verbalized yes. CT put him in for transport. Plan of care reviewed with patient and spouse. Both verbalized understanding. All questions encouraged and answered. Will continue to monitor.

## 2018-02-14 NOTE — ASSESSMENT & PLAN NOTE
BG goal 140-180  A1C 6.9%    Above goal.  Levemir was not split.     Recommend Levemir 8u BID, increase Novolog to 4u AC.  Continue SSI with POCT BG AC/HS/0200  Recommend diabetic diet.  Will continue to monitor     Discharge planning:  Patient is well educated and knows his DM regimen in outpatient setting.  Okay to discharge patient on home regimen of NPH 10u BID and regular insulin 5u AC.  Patient due for Endocrinology follow up this month.  Will schedule discharge follow up 1-2 weeks s/p discharge with NETTIE Vázquez.

## 2018-02-14 NOTE — PROGRESS NOTES
Pt arrives to  via stretcher for thoracentesis.  A/o x4.  Resting comfortably, awaiting consent.

## 2018-02-14 NOTE — ASSESSMENT & PLAN NOTE
- Chronic recurrent pleural effusions in the setting of post-heart transplant constrictive cardiomyopathy.   - S/P Pleurodex recently discontinued chest tube due to improved out-put.   - No significant increase in the size of effusion compared to the last one  - Repeat CT chest reviewed, recommended by staff to IR drain the fluid and to analyse.

## 2018-02-14 NOTE — PROGRESS NOTES
[facility]  Cardiology  Progress Note    Patient Name: Lv Crocker  MRN: [id number]  Admission Date: 2/11/2018  Hospital Length of Stay: 3 days  Code Status: Full Code   Attending Physician: Jose A Lloyd MD   Primary Care Physician: Philippe Mohr MD  Expected Discharge Date: 2/14/2018  Principal Problem:Pleural effusion    Subjective:     Hospital Course:   No notes on file    Interval History: pt reported feeling well this morning. Denied CP, SOB, cough. ,Diarrhea resolved.     Review of Systems   Constitution: Negative.   HENT: Negative.    Eyes: Negative.    Cardiovascular: Negative.    Respiratory: Negative.    Endocrine: Negative.    Skin: Negative.      Objective:     Vital Signs (Most Recent):  Temp: 98.4 °F (36.9 °C) (02/14/18 1200)  Pulse: 99 (02/14/18 1321)  Resp: 16 (02/14/18 1321)  BP: (!) 94/55 (02/14/18 1321)  SpO2: (!) 93 % (02/14/18 1321) Vital Signs (24h Range):  Temp:  [98.4 °F (36.9 °C)-99.2 °F (37.3 °C)] 98.4 °F (36.9 °C)  Pulse:  [] 99  Resp:  [16-18] 16  SpO2:  [92 %-94 %] 93 %  BP: ()/(51-55) 94/55     Weight: 85 kg (187 lb 6.3 oz)  Body mass index is 29.35 kg/m².     SpO2: (!) 93 %  O2 Device (Oxygen Therapy): room air      Intake/Output Summary (Last 24 hours) at 02/14/18 1352  Last data filed at 02/14/18 0759   Gross per 24 hour   Intake              630 ml   Output              700 ml   Net              -70 ml       Lines/Drains/Airways     Peripheral Intravenous Line                 Peripheral IV - Single Lumen 02/11/18 1852 Hand 2 days                Physical Exam   Constitutional: He is oriented to person, place, and time. He appears well-developed and well-nourished.   HENT:   Head: Normocephalic and atraumatic.   Eyes: Conjunctivae and EOM are normal. Pupils are equal, round, and reactive to light.   Neck: Normal range of motion. Neck supple. No JVD present. No tracheal deviation present. No thyromegaly present.   Cardiovascular: Normal  rate.    Pulmonary/Chest: Effort normal and breath sounds normal.   Decreased breath sounds on the right lower lobe    Abdominal: Soft. Bowel sounds are normal. He exhibits no distension and no mass. There is no tenderness. There is no rebound and no guarding.   Musculoskeletal: Normal range of motion. He exhibits no edema, tenderness or deformity.   Lymphadenopathy:     He has no cervical adenopathy.   Neurological: He is alert and oriented to person, place, and time. He has normal reflexes. He displays normal reflexes. No cranial nerve deficit. He exhibits normal muscle tone. Coordination normal.   Skin: Skin is warm and dry. No erythema. No pallor.   Psychiatric: He has a normal mood and affect. His behavior is normal. Judgment normal.   Nursing note and vitals reviewed.      Significant Labs:   CMP   Recent Labs  Lab 02/13/18  0551 02/14/18  0544   * 131*   K 5.0 4.9   CL 99 97   CO2 23 24   * 279*   BUN 43* 51*   CREATININE 3.2* 3.5*   CALCIUM 8.8 7.9*   PROT 6.3 6.1   ALBUMIN 2.5* 2.5*   BILITOT 1.1* 1.2*   ALKPHOS 418* 470*   AST 20 21   ALT 15 16   ANIONGAP 13 10   ESTGFRAFRICA 25.8* 23.2*   EGFRNONAA 22.3* 20.0*   , CBC   Recent Labs  Lab 02/13/18  0551 02/14/18  0544   WBC 5.08 5.16   HGB 8.2* 7.9*   HCT 25.4* 23.5*    217    and INR No results for input(s): INR, PROTIME in the last 48 hours.    Significant Imaging: X-Ray: CXR: X-Ray Chest 1 View (CXR): No results found for this visit on 02/11/18.    Assessment and Plan:     Brief HPI:     * Pleural effusion    - Chronic recurrent pleural effusions in the setting of post-heart transplant constrictive cardiomyopathy.   - S/P Pleurodex recently discontinued chest tube due to improved out-put.   - No significant increase in the size of effusion compared to the last one  - Repeat CT chest reviewed, recommended by staff to IR drain the fluid and to analyse.         Diarrhea    - Resolved         Type 1 diabetes mellitus with stage 3 chronic  kidney disease    - Continue current regimen on basal with levimir at 15 U QHS and boluses with SSI   - Neuropathy with controlled symptom while on Gabapentin and Nor-tryptlyine.         Chronic kidney disease (CKD), stage III (moderate)    -Non-Oliguric.   -Fluctuating creatinine; this AM at 3.2  -Follow the trend, and avoid dehydration and nephrotoxic drug exposure.         Heart transplanted    -S/P OHTx 11/8/14 (high risk); history of AMR treated with PP and IVIG 4/2016  -Currently stable hemodynamics  -Continue Prograf 3/3, Prednisone 2.5mg daily and Myfortic 720 BID  -Prograf goal 6-8, level this AM at 6.9        Anxiety    - No active symptoms.   - Continue Citalopram             VTE Risk Mitigation         Ordered     heparin (porcine) injection 5,000 Units  Every 8 hours     Route:  Subcutaneous        02/11/18 1351     Medium Risk of VTE  Once      02/11/18 1351          Gage Goodman MD  Cardiology  [facility]

## 2018-02-14 NOTE — SUBJECTIVE & OBJECTIVE
Interval History: pt reported feeling well this morning. Denied CP, SOB, cough. ,Diarrhea resolved.     Review of Systems   Constitution: Negative.   HENT: Negative.    Eyes: Negative.    Cardiovascular: Negative.    Respiratory: Negative.    Endocrine: Negative.    Skin: Negative.      Objective:     Vital Signs (Most Recent):  Temp: 98.4 °F (36.9 °C) (02/14/18 1200)  Pulse: 99 (02/14/18 1321)  Resp: 16 (02/14/18 1321)  BP: (!) 94/55 (02/14/18 1321)  SpO2: (!) 93 % (02/14/18 1321) Vital Signs (24h Range):  Temp:  [98.4 °F (36.9 °C)-99.2 °F (37.3 °C)] 98.4 °F (36.9 °C)  Pulse:  [] 99  Resp:  [16-18] 16  SpO2:  [92 %-94 %] 93 %  BP: ()/(51-55) 94/55     Weight: 85 kg (187 lb 6.3 oz)  Body mass index is 29.35 kg/m².     SpO2: (!) 93 %  O2 Device (Oxygen Therapy): room air      Intake/Output Summary (Last 24 hours) at 02/14/18 1352  Last data filed at 02/14/18 0759   Gross per 24 hour   Intake              630 ml   Output              700 ml   Net              -70 ml       Lines/Drains/Airways     Peripheral Intravenous Line                 Peripheral IV - Single Lumen 02/11/18 1852 Hand 2 days                Physical Exam   Constitutional: He is oriented to person, place, and time. He appears well-developed and well-nourished.   HENT:   Head: Normocephalic and atraumatic.   Eyes: Conjunctivae and EOM are normal. Pupils are equal, round, and reactive to light.   Neck: Normal range of motion. Neck supple. No JVD present. No tracheal deviation present. No thyromegaly present.   Cardiovascular: Normal rate.    Pulmonary/Chest: Effort normal and breath sounds normal.   Decreased breath sounds on the right lower lobe    Abdominal: Soft. Bowel sounds are normal. He exhibits no distension and no mass. There is no tenderness. There is no rebound and no guarding.   Musculoskeletal: Normal range of motion. He exhibits no edema, tenderness or deformity.   Lymphadenopathy:     He has no cervical adenopathy.    Neurological: He is alert and oriented to person, place, and time. He has normal reflexes. He displays normal reflexes. No cranial nerve deficit. He exhibits normal muscle tone. Coordination normal.   Skin: Skin is warm and dry. No erythema. No pallor.   Psychiatric: He has a normal mood and affect. His behavior is normal. Judgment normal.   Nursing note and vitals reviewed.      Significant Labs:   CMP   Recent Labs  Lab 02/13/18  0551 02/14/18  0544   * 131*   K 5.0 4.9   CL 99 97   CO2 23 24   * 279*   BUN 43* 51*   CREATININE 3.2* 3.5*   CALCIUM 8.8 7.9*   PROT 6.3 6.1   ALBUMIN 2.5* 2.5*   BILITOT 1.1* 1.2*   ALKPHOS 418* 470*   AST 20 21   ALT 15 16   ANIONGAP 13 10   ESTGFRAFRICA 25.8* 23.2*   EGFRNONAA 22.3* 20.0*   , CBC   Recent Labs  Lab 02/13/18  0551 02/14/18  0544   WBC 5.08 5.16   HGB 8.2* 7.9*   HCT 25.4* 23.5*    217    and INR No results for input(s): INR, PROTIME in the last 48 hours.    Significant Imaging: X-Ray: CXR: X-Ray Chest 1 View (CXR): No results found for this visit on 02/11/18.

## 2018-02-14 NOTE — PROCEDURES
Radiology Post-Procedure Note    Pre Op Diagnosis: Ascites  Post Op Diagnosis: Same    Procedure: Thoracentesis    Procedure performed by: Telly Robles MD;  Staff radiologist Guicho Vasquez was available at all times throughout the procedure.     Written Informed Consent Obtained: Yes  Specimen Removed: YES - 40 cc clear yellow fluid.   Estimated Blood Loss: Minimal    Findings:   Initial ultrasound showed significant loculations within a moderate size thoracentesis.  Only 40 cc of clear yellow fluid was able to be removed despite multiple catheter repositions.     Patient tolerated procedure well.    Telly Robles  R-4  Pager: [phone]

## 2018-02-15 NOTE — NURSING
Received and acknowledged discharge instructions ordered at 1750 hours.  Provided a copy of AVS to patient.  Discharge instructions explained to patient.   Discontinued PIV in  left hand.  Tip intact.  Removed telemetry.  Informed patient of future follow-up appointments.  Administered all ordered medications up to and including 1800 hours.  Explained medication regime to include new, continued, and discontinued medications.  Patient informed when next dose is due for all medications. Completed MIS.  Discussed when to call the doctor. Discussed heart failure tips, diagnosing heart failure, treatment plan, diet, procedures, tracking weight, signs of a flare-up to include:  swelling, shortness of breath dizziness, chest pain and cough.  Patient acknowledged understanding of all instructions. Will continue to monitor patient until off unit. Patient ambulated off of unit with spouse.

## 2018-02-15 NOTE — DISCHARGE SUMMARY
"[facility]  Cardiology  Discharge Summary      Patient Name: Lv Crocker  MRN: [id number]  Admission Date: 2/11/2018  Hospital Length of Stay: 3 days  Discharge Date and Time: 2/14/2018  6:23 PM  Attending Physician: Jose A Lloyd MD  Discharging Provider: Gage Goodman MD  Primary Care Physician: Philippe Mohr MD    HPI:   Lv Crocker is a 43 yo WM s/p OHTX 11/14, complicated by AMR, treated 04/15 with 5 days PP, IVIG x 2 doses, was scheduled for Rituximab on 5/1/15 but developed pancytopenia in April 2015 who comes in for a clinic visit. Angiogram 2/16 with IVUS showed no evidence of CAV. 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 He was getting monthly paracentesis and thoracentesis until he underwent a VATS with pleurX catheter placement on 1/11/2018. He had been draining 700-800cc of fluid every 2 days until about 3 weeks ago, where the drainage had decreased. He went to see Dr. James in clinic on Wednesday and it was decided to remove the catheter. Since the removal, he reports not feeling well and having low grade fever no higher than 100.2. He has noted hourly watery diarrhea that is reminiscent of when he had C-diff, but does not have the same odor as then. He reported some dysuria yesterday. No vomiting. He has noted increasing dry cough and shortness of breath similar to when he was last diagnosed with pneumonia. He reports no vomiting, and no nasal congestion or sore throat. He has decreased appetite."     * No surgery found *     Indwelling Lines/Drains at time of discharge:  Lines/Drains/Airways          No matching active lines, drains, or airways          Hospital Course (synopsis of major diagnoses, care, treatment, and services provided during the course of the hospital stay):   Patient was admitted for suspected bacterial infection, pneumonia or bronchitis, " and diarrhea.  Pt was started on IV Antibiotic initially which were deescalated to oral antibiotics.    Work up did not reveal bacterial infection, with white count and pro-calcitonin being normal.   Work up for diarrhea also revealed negative for C-Diff and negative white count.   Pt work up for pneumonia revealed old right sided pleural effusion., which was further investigated by CT chest non-contrast . The comparison did not show increase in size of the effusion. However, after discussing with thoracic surgery, it was recommended to tap the plural effusion.     Pleural effusion on the right side was tapped which yielded clear fluid AND sample was sent for further work up.     Pt remained stable, afebrile with improved symptoms of SOB and diarrhea x 2 days.     Pt was discharged in stable condition with plan for follow up in thoracic surgery and heart failure clinic  Consults:   Consults         Status Ordering Provider     Inpatient consult to Cardiology  Once     Provider:  (Not yet assigned)    Completed BALBINA CIFUENTES     Inpatient consult to Endocrinology  Once     Provider:  (Not yet assigned)    Completed ALBA VIERA     Inpatient consult to Infectious Diseases  Once     Provider:  (Not yet assigned)    Completed ALBA VIERA     Inpatient consult to Interventional Radiology  Once     Provider:  (Not yet assigned)    Completed SURENDRA HARTMANN     Inpatient consult to Pulmonology  Once     Provider:  (Not yet assigned)    Completed SURENDRA HARTMANN          Significant Diagnostic Studies: Labs:   CMP   Recent Labs  Lab 02/13/18  0551 02/14/18  0544   * 131*   K 5.0 4.9   CL 99 97   CO2 23 24   * 279*   BUN 43* 51*   CREATININE 3.2* 3.5*   CALCIUM 8.8 7.9*   PROT 6.3 6.1   ALBUMIN 2.5* 2.5*   BILITOT 1.1* 1.2*   ALKPHOS 418* 470*   AST 20 21   ALT 15 16   ANIONGAP 13 10   ESTGFRAFRICA 25.8* 23.2*   EGFRNONAA 22.3* 20.0*   , CBC   Recent Labs  Lab 02/13/18  0551 02/14/18  0544   WBC 5.08 5.16    HGB 8.2* 7.9*   HCT 25.4* 23.5*    217    and INR   Lab Results   Component Value Date    INR 1.0 02/11/2018    INR 1.0 01/23/2018    INR 1.1 01/12/2018       Pending Diagnostic Studies:     Procedure Component Value Units Date/Time    Cytology Specimen-Medical Cytology (Fluid/Wash/Brush) [[id number]] Collected:  02/14/18 1401    Order Status:  Sent Lab Status:  No result     Specimen:  Pleural Fluid     Legionella antigen, urine [[id number]] Collected:  02/12/18 1438    Order Status:  Sent Lab Status:  In process Updated:  02/12/18 1518    Specimen:  Urine from Urine, Clean Catch           Final Active Diagnoses:    Diagnosis Date Noted POA    PRINCIPAL PROBLEM:  Pleural effusion [J90] 12/13/2017 Yes    Diarrhea [R19.7] 02/11/2018 Unknown    Type 1 diabetes mellitus with stage 3 chronic kidney disease [E10.22, N18.3] 07/20/2016 Yes    Hypothyroidism due to Hashimoto's thyroiditis [E03.8, E06.3] 07/01/2015 Yes    Chronic kidney disease (CKD), stage III (moderate) [N18.3] 04/26/2015 Yes    Heart transplanted [Z94.1]  Not Applicable    Anxiety [F41.9]  Yes      Problems Resolved During this Admission:    Diagnosis Date Noted Date Resolved POA    SOB (shortness of breath) [R06.02] 02/11/2018 02/14/2018 Yes       Discharged Condition: stable    Follow Up:  Follow-up Information     Kanika Ferreira MD In 2 weeks.    Specialties:  Transplant, Cardiology  Contact information:  [address] [place] [address] [id number] [phone]                 Patient Instructions:     Diet diabetic     Diet Cardiac     Activity as tolerated     Notify your health care provider if you experience any of the following:  temperature >100.4     Notify your health care provider if you experience any of the following:  persistent nausea and vomiting or diarrhea     Notify your health care provider if you experience any of the following:  difficulty breathing or increased cough       Medications:  Reconciled Home Medications:    Discharge Medication List as of 2/14/2018  5:56 PM      START taking these medications    Details   !! insulin aspart (NOVOLOG) 100 unit/mL InPn pen Inject 3 Units into the skin 3 (three) times daily., Starting Wed 2/14/2018, Until Thu 2/14/2019, Normal      !! insulin aspart (NOVOLOG) 100 unit/mL InPn pen Inject 4 Units into the skin 3 (three) times daily., Starting Thu 2/15/2018, Until Fri 2/15/2019, Normal      !! insulin detemir (LEVEMIR FLEXTOUCH) 100 unit/mL (3 mL) SubQ InPn pen Inject 15 Units into the skin every evening., Starting Wed 2/14/2018, Until Thu 2/14/2019, Normal      !! insulin detemir (LEVEMIR FLEXTOUCH) 100 unit/mL (3 mL) SubQ InPn pen Inject 8 Units into the skin 2 (two) times daily., Starting Wed 2/14/2018, Until Thu 2/14/2019, Normal       !! - Potential duplicate medications found. Please discuss with provider.      CONTINUE these medications which have CHANGED    Details   aspirin (ECOTRIN) 81 MG EC tablet Take 1 tablet (81 mg total) by mouth once daily., Starting Wed 2/14/2018, Normal      gabapentin (NEURONTIN) 300 MG capsule Take 3 capsules (900 mg total) by mouth 3 (three) times daily., Starting Wed 2/14/2018, Normal      insulin NPH (NOVOLIN N) 100 unit/mL injection Inject 20 Units into the skin 2 (two) times daily before meals., Starting Wed 2/14/2018, Until Thu 2/14/2019, Normal      moxifloxacin (AVELOX) 400 mg tablet Take 1 tablet (400 mg total) by mouth once daily., Starting Thu 2/15/2018, Normal      tacrolimus (PROGRAF) 1 MG Cap Taked 3mg orally in the morning and 3mg orally in the evening. S/p heart transplant  11/18/2014  ICD-10 Z94.1, Normal         CONTINUE these medications which have NOT CHANGED    Details   escitalopram oxalate (LEXAPRO) 20 MG tablet Take 1 tablet (20 mg total) by mouth once daily., Starting 12/23/2016, Until Discontinued, Normal      lancets Misc 1 Device by Misc.(Non-Drug; Combo Route) route 4 (four) times daily with meals and nightly., Starting  "12/24/2014, Until Discontinued, Normal      levothyroxine (SYNTHROID) 150 MCG tablet Take 1 tablet (150 mcg total) by mouth every morning., Starting Wed 5/31/2017, No Print      magnesium oxide (MAG-OX) 400 mg tablet Take 1 tablet (400 mg total) by mouth once daily., Starting 12/20/2016, Until Discontinued, Normal      mycophenolate (MYFORTIC) 180 MG TbEC Take 4 tablets (720 mg total) by mouth 2 (two) times daily. S/P Heart Transplant 11/18/2014 ICD-10 Z94.1, Starting 10/20/2016, Until Discontinued, Normal      nortriptyline (PAMELOR) 25 MG capsule Take 1 capsule (25 mg total) by mouth every evening., Starting 4/27/2017, Until Discontinued, No Print      ondansetron (ZOFRAN-ODT) 4 MG TbDL Take 2 tablets (8 mg total) by mouth every 8 (eight) hours as needed (nausea)., Starting Wed 6/21/2017, Normal      pantoprazole (PROTONIX) 40 MG tablet TAKE ONE TABLET BY MOUTH EVERY DAY, Normal      pen needle, diabetic 32 gauge x 5/32" Ndle Uses 5 pen needles a day, Normal      pravastatin (PRAVACHOL) 40 MG tablet Take 1 tablet (40 mg total) by mouth every evening., Starting 4/11/2017, Until Discontinued, Normal      predniSONE (DELTASONE) 2.5 MG tablet Take 1 tablet (2.5 mg total) by mouth once daily. S/P Heart Transplant 11/18/2014 ICD-10 Z94.1, Starting Wed 1/10/2018, Normal      tamsulosin (FLOMAX) 0.4 mg Cp24 TAKE 2 CAPSULES(0.8 MG) BY MOUTH EVERY EVENING, Normal      torsemide (DEMADEX) 20 MG Tab Take 2 tablets (40 mg total) by mouth once daily., Starting Wed 1/3/2018, Until Thu 1/3/2019, Normal         STOP taking these medications       glucagon (human recombinant) inj 1mg/mL kit Comments:   Reason for Stopping:         oxyCODONE-acetaminophen (PERCOCET) 5-325 mg per tablet Comments:   Reason for Stopping:               Time spent on the discharge of patient: 45 minutes    Gage Goodman MD  Cardiology  [facility]  "

## 2018-02-19 NOTE — TELEPHONE ENCOUNTER
"Pt's wife called concerned about pt. He almost passed out in bathroom last night after having coughing spell. He has been SOB and wanted to know about home oxygen. I asked pt's wife if the doctors offered any other treatment suggestion, she stated "not really". She wanted pt to see Dr. Ferreira in [place]. I consulted Dr. Ferreira regardign pt. Dr. Ferreira ordered chest xray today, 6 minute walk to qualify for home oxygen. Labs for tomorrow clinic visit.   "

## 2018-02-20 PROBLEM — R05.3 COUGH, PERSISTENT: Status: ACTIVE | Noted: 2018-01-01

## 2018-02-20 NOTE — ASSESSMENT & PLAN NOTE
Also possibly due to inactivity, however also possibly has underlying sleep apnea. Will refer again to sleep clinic, last evaluation was before OHT.

## 2018-02-20 NOTE — ASSESSMENT & PLAN NOTE
Patient is not very active, encouraged him to participate more in activities, including with family or volunteering. He is going to do this. In addition, will be looking into pulmonary rehabilitation, to see if this can help his respiratory status.

## 2018-02-20 NOTE — ASSESSMENT & PLAN NOTE
Volume status appears to be stable, has increased abdominal swelling, will be getting paracentesis when he requests in the next week or two.   In the mean time, volume is stable, with mildly hypervolemic on exam only, which is stable from discharge in hospital.   Labs pending from today, will follow up on them.   Continue current diuretic regimen.

## 2018-02-20 NOTE — PATIENT INSTRUCTIONS
Start tessalon perles 200mg every 8 hours.     Stop myfortic.    Start cellcept 1000mg po BID.     Pulmonary rehab as tolerated.

## 2018-02-20 NOTE — LETTER
February 22, 2018        Placido Tobias  [address] [address]  Phone: [phone]  Fax: [phone]             [facility]  [id number] [place] [id number]  Phone: [phone]  Fax: [phone]   Patient: Lv Crocker   MR Number: [id number]   YOB: 1975   Date of Visit: 2/20/2018       Dear Dr. Placido Tobias    Thank you for referring Lv Crocker to me for evaluation. Attached you will find relevant portions of my assessment and plan of care.    If you have questions, please do not hesitate to call me. I look forward to following Lv Crocker along with you.    Sincerely,    Kanika Ferreira MD    Enclosure    If you would like to receive this communication electronically, please contact [email or web address] or [phone] to request [employer] access.    [employer] is a tool which provides read-only access to select patient information with whom you have a relationship. Its easy to use and provides real time access to review your patients record including encounter summaries, notes, results, and demographic information.    If you feel you have received this communication in error or would no longer like to receive these types of communications, please e-mail [email or web address]

## 2018-02-20 NOTE — ASSESSMENT & PLAN NOTE
Patient states sugars are relatively well controlled, followed by endocrinology for this, continue as directed. Renal function is pending from today, concerned because was up higher earlier this week with cr 3.5.

## 2018-02-20 NOTE — PROGRESS NOTES
Subjective:   Mr. Crocker is a 42 y.o. year old White male who received a  - brain death heart transplant on 14.      CMV status:   Donor: -  Recipient: +    HPI     Mr. Crocker is a 42 year old WM with ICM, DM1 s/p OHT 2014, now a little over 3 years post-OHT, with postoperative course complicated by afib, sternal plate for chest closure, with [facility] 04/15 treated with 5 days PP, IVIG x 2, did not get ritux due to pancytopenia 2015, after which I believe he got CMV and c. Dif at the same time. Additionally Hashimoto's thyroidits, DM1 on insulin He has had LHC last in  at which time coronary arteries were normal, since then not had it again due to CKD. Patient getting paracenteses tapped regularly, every 3-4 weeks. Most recently has had ongoing issues with his lungs, had gotten 2 thoracenteses, after which he underwent VATS 18 followed by pleurex catheter placement and effusion drainage 18, subsequently had it removed 18 after drainage had decreased despite multiple attempts to drain it. Has been having significant coughing fits that result in him being near-syncopal at times, although difficult to say if he has passed out or not- patient most recently was admitted to the hospital for increased AGUILAR, coughing and pre-syncope -18 at [facility].   Patient was started on antibiotics for suspected bacterial infection, with some diarrhea, bronchitis, and possible pneumonia. Ct chest showed some pleural fluid collection and after talking with Dr. James, we arranged for him to receive a diagnostic tap with IR, from which the fluid collection demonstrated no growth or significant findings at all really. Cell counts do not appear to have been sent but will recheck. Patient states since his hospital stay, yesterday had a significant coughing fit again, after which he nearly passed out, is being seen in clinic today for this. Denies any cardiopulmonary complaints, no leg  swelling, has some abdominal swelling, which is moderate for him. Denies significant shortness of breath with mild exertion, had 6MWT yesterday to see if he qualified for home O2, and his O2 sats actually improved after recovery from where he started initially. He and his wife admit he is in bed most days, not very active.     Current immunosuppression:   - Pred 2.5 mg daily, myfortic 720mg bid, prograf 3mg/3mg    TTE 12/12/17:    1 - Normal left ventricular systolic function (EF 60-65%).     2 - Normal left ventricular diastolic function.     3 - Normal right ventricular systolic function .     4 - Pulmonary hypertension. The estimated PA systolic pressure is 41 mmHg.     5 - Trivial to mild mitral regurgitation.     6 - Trivial tricuspid regurgitation.     7 - Intermediate central venous pressure.     8 - No wall motion abnormalities.     DSE 11/30/17:    1 - Post-cardiac transplantation study.     2 - Normal left ventricular systolic function (EF 55-60%).     3 - No wall motion abnormalities.     4 - Normal left ventricular diastolic function.     5 - Low normal to mildly depressed right ventricular systolic function .     6 - The estimated PA systolic pressure is 35 mmHg.     7 - Trivial mitral regurgitation.     8 - Mild tricuspid regurgitation.     9 - Intermediate central venous pressure.   -No evidence of stress induced myocardial ischemia.       Review of Systems   Constitution: Positive for malaise/fatigue. Negative for chills, decreased appetite, diaphoresis, fever, weakness, night sweats, weight gain and weight loss.   HENT: Negative.    Eyes: Negative.  Negative for visual disturbance.   Cardiovascular: Positive for dyspnea on exertion and leg swelling. Negative for chest pain, claudication, cyanosis, irregular heartbeat, near-syncope, orthopnea, palpitations, paroxysmal nocturnal dyspnea and syncope.   Respiratory: Positive for shortness of breath. Negative for cough, hemoptysis, sleep disturbances due  "to breathing, snoring, sputum production and wheezing.    Endocrine: Negative.    Hematologic/Lymphatic: Negative.  Negative for adenopathy and bleeding problem. Does not bruise/bleed easily.   Skin: Positive for poor wound healing. Negative for color change, dry skin, nail changes, rash, skin cancer and suspicious lesions.   Musculoskeletal: Positive for joint pain. Negative for back pain, falls, gout and muscle weakness.        Shoulder-torn rotator cuff   Gastrointestinal: Positive for bloating. Negative for abdominal pain, anorexia, change in bowel habit, constipation, diarrhea, heartburn, hematemesis, hematochezia, hemorrhoids, melena, nausea and vomiting.   Genitourinary: Negative.  Negative for nocturia.   Neurological: Negative for excessive daytime sleepiness, dizziness, focal weakness, headaches, light-headedness, paresthesias and tremors.        Twitching episodes that have been happening, sometimes with coughing, or after coughing   Psychiatric/Behavioral: Negative for depression and memory loss. The patient does not have insomnia and is not nervous/anxious.        Objective:   Blood pressure 90/60, pulse 88, height 5' 7" (1.702 m), weight 84.9 kg (187 lb 2.7 oz).body mass index is 29.32 kg/m².    Physical Exam   Constitutional: He is oriented to person, place, and time. He appears well-developed and well-nourished. No distress.   HENT:   Head: Normocephalic and atraumatic.   Nose: Nose normal.   Mouth/Throat: Oropharynx is clear and moist. No oropharyngeal exudate.   Eyes: Conjunctivae and EOM are normal. Pupils are equal, round, and reactive to light. No scleral icterus.   Neck: Normal range of motion. Neck supple. JVD (12 cm H2O) present. Carotid bruit is not present. No tracheal deviation present. No thyromegaly present.   Cardiovascular: Regular rhythm, S1 normal, S2 normal, normal heart sounds and normal pulses.  Tachycardia present.  PMI is not displaced.  Exam reveals no gallop, no S3 and no " friction rub.    No murmur heard.  tachycardic   Pulmonary/Chest: Effort normal and breath sounds normal. No respiratory distress. He has no wheezes. He has no rales. He exhibits no tenderness.   Abdominal: Soft. Bowel sounds are normal. He exhibits distension (mild) and ascites. He exhibits no mass. There is no tenderness. There is no rebound and no guarding.   Musculoskeletal: Normal range of motion. He exhibits no edema (trace edema bilateral lower extremities) or tenderness.   Neurological: He is alert and oriented to person, place, and time. Coordination normal.   Skin: Skin is warm and dry. No rash noted. He is not diaphoretic. No erythema. No pallor.   Psychiatric: He has a normal mood and affect. His behavior is normal. Judgment and thought content normal.   Nursing note and vitals reviewed.      Chemistry        Component Value Date/Time     (L) 02/14/2018 0544    K 4.9 02/14/2018 0544    CL 97 02/14/2018 0544    CO2 24 02/14/2018 0544    BUN 51 (H) 02/14/2018 0544    CREATININE 3.5 (H) 02/14/2018 0544     (H) 02/14/2018 0544        Component Value Date/Time    CALCIUM 7.9 (L) 02/14/2018 0544    ALKPHOS 470 (H) 02/14/2018 0544    AST 21 02/14/2018 0544    ALT 16 02/14/2018 0544    BILITOT 1.2 (H) 02/14/2018 0544            Magnesium   Date Value Ref Range Status   02/14/2018 1.9 1.6 - 2.6 mg/dL Final       Lab Results   Component Value Date    WBC 5.16 02/14/2018    HGB 7.9 (L) 02/14/2018    HCT 23.5 (L) 02/14/2018    MCV 91 02/14/2018     02/14/2018       Lab Results   Component Value Date    INR 1.0 02/11/2018    INR 1.0 01/23/2018    INR 1.1 01/12/2018       BNP   Date Value Ref Range Status   02/11/2018 279 (H) 0 - 99 pg/mL Final     Comment:     Values of less than 100 pg/ml are consistent with non-CHF populations.   12/18/2017 179 (H) 0 - 99 pg/mL Final     Comment:     Values of less than 100 pg/ml are consistent with non-CHF populations.   12/11/2017 330 (H) 0 - 99 pg/mL Final      Comment:     Values of less than 100 pg/ml are consistent with non-CHF populations.       Tacrolimus Lvl   Date Value Ref Range Status   02/14/2018 6.9 5.0 - 15.0 ng/mL Final     Comment:     Testing performed by Liquid Chromatography-Tandem  Mass Spectrometry (LC-MS/MS).  This test was developed and its performance characteristics  determined by [facility], Department of Pathology  and Laboratory Medicine in a manner consistent with CLIA  requirements. It has not been cleared or approved by the [place]  Food and Drug Administration.  This test is used for clinical   purposes.  It should not be regarded as investigational or for  research.         Labs were reviewed with the patient.    Assessment:     1. Heart transplanted    2. Cough, persistent    3. History of restrictive cardiomyopathy    4. Debility    5. Fatigue, unspecified type    6. Type 1 diabetes mellitus with stage 3 chronic kidney disease        Plan:   S/p OHT-   Will switch from myfortic to 1000mg po BID cellcept, continue tacrolimus and prednisone for now.  Follow up on WBC x 2 weeks, additionally please add MPA level to labs in 2 weeks. Try to titrate as indicated.   HLA pending    Jerking- not sure why this could be happening, patient's getting a pulse ox delivered, possibly due to dropping bp or oxygen with coughing.    Shortness of breath/atelectasis/effusion/coughing- 6MWT led to patient having increase in pulse ox with ambulation and recovery, perhaps could benefit a great deal from pulmonary rehab even, since he is clearly not very active at all at home. Will refer to pulmonary rehab and see if this helps. Will see if tessalon perles at higher dose helps his cough (200mg po TID).     Ascites- paracenteses as indicated.     Patient admits to being depressed possibly- will check with chart review/pharmacists if there is something we should do that would be safe given his other meds he is on, additionally patient states he will think  about seeing psychiatry for this, not ready to yet, wants to see if getting back into rehab and helping son with football will help him.     Will discuss with team at chart review regarding plans going forward (pleurodesis vs port vs other).     Return instructions as set forth by post transplant schedule or as needed:    Clinic: Return for labs and/or biopsy weekly the first month, every two weeks during month 2 and then monthly for the first year at the provider or coordinator's discretion. During the second year, return to clinic every 3 months. Post transplant year 3-5 return every 6 months. There will be a comprehensive post transplant evaluation every year that may include LHC/RHC/biopsy, stress test, echo, CXR, and other health screening exams.    In addition to the clinical assessment, I have ordered Allomap testing for this patient to assist in identification of moderate/severe acute cellular rejection (ACR) in a pt with stable Allograft function instead of endomyocardial biopsy.     Patient is reminded to call with any health changes as these can be early signs of transplant complications. Patient is advised to make sure any new medications or changes of old medications are discussed with a pharmacist or physician knowledgeable with transplant to avoid rejection/drug toxicity related to significant drug interactions.    UNOS Patient Status  Functional Status: 80% - Normal activity with effort: some symptoms of disease  Physical Capacity: No Limitations  Working for Income: Unknown    Kanika Ferreira MD

## 2018-02-20 NOTE — PROCEDURES
"[facility] - Pulm Function Svcs  Six Minute Walk     SUMMARY     Ordering Provider: Dr. Ferreira   Interpreting Provider: Dr. Adkins  Performing nurse/tech/RT: CLAUDIA Serrano RRT  Diagnosis:  (SOB)  Height: 5' 7" (170.2 cm)  Weight: 84.4 kg (185 lb 15.3 oz)  BMI (Calculated): 29.2   Patient Race:             Phase Oxygen Assessment Supplemental O2 Heart   Rate Blood Pressure Shani Dyspnea Scale Rating   Resting 92 % Room Air 112 bpm (!) 86/58 3   Exercise        Minute        1 96 % Room Air 111 bpm     2 93 % Room Air 114 bpm     3 93 % Room Air 120 bpm     4 94 % Room Air 116 bpm     5 93 % Room Air 119 bpm     6  94 % Room Air 119 bpm 94/59 5-6   Recovery        Minute        1 95 % Room Air 118 bpm     2 94 % Room Air 115 bpm     3 97 % Room Air 110 bpm     4 97 % Room Air 107 bpm 102/56 3     Six Minute Walk Summary  6MWT Status: completed with stops  Patient Reported: Dyspnea, Lightheadedness     Interpretation:  Did the patient stop or pause?: Yes  How many times did the patient stop or pause?: 2  Stop Time 1: 180  Restart Time 1: 204.6  Pause Time 1: 24.6 seconds  Stop Time 2: 324  Restart Time 2: 360  Pause Time 2: 36 seconds         Total Time Walked (Calculated): 299.4 seconds  Final Partial Lap Distance (feet): 100 feet  Total Distance Meters (Calculated): 396.24 meters  Predicted Distance Meters (Calculated): 610.08 meters  Percentage of Predicted (Calculated): 64.95  Peak VO2 (Calculated): 15.87  Mets: 4.53  Has The Patient Had a Previous Six Minute Walk Test?: No       Previous 6MWT Results  Has The Patient Had a Previous Six Minute Walk Test?: No     REPORT    CLINICAL INTERPRETATION:  Six minute walk distance is 396.24m (64.95 % predicted) with moderate dyspnea.  During exercise, there was no significant desaturation while breathing room air.  Blood pressure was decreased at start with walking with baseline tachycardia.    The patient reported non-pulmonary symptoms during " exercise.  LIGHTHEADENESS  Significant exercise impairment is likely due to subjective symptoms.  The patient did not complete the study, walking 299.4 seconds of the 360 second test.    No previous study performed.  Based upon age and body mass index, exercise capacity is less than predicted.    Froilan Adkins MD

## 2018-02-20 NOTE — ASSESSMENT & PLAN NOTE
Patient doing well from OHT standpoint, most recent echo with LVEF preserved, diastolic dysfunction.   Will continue tacrolimus, prednisone. Switch from myfortic to cellcept as copay is significantly less for generic cellcept. Will monitor WBC weekly for the next couple of weeks, and additionally would like to add MPA level onto labs next week.   Echo at next visit, follow tacrolimus levels.

## 2018-02-23 NOTE — ANESTHESIA POSTPROCEDURE EVALUATION
"Anesthesia Post Evaluation    Patient: Lv Crocker    Procedure(s) Performed: Procedure(s) (LRB):  THORACOSCOPY-VIDEO ASSISTED (VATS) W/PLEURAL BIOPSY (Right)  Insertion-Catheter-Chest (Right)    Final Anesthesia Type: general  Patient location during evaluation: PACU  Patient participation: Yes- Able to Participate  Level of consciousness: awake and alert and oriented  Post-procedure vital signs: reviewed and stable  Pain management: adequate  Airway patency: patent  PONV status at discharge: No PONV  Anesthetic complications: no      Cardiovascular status: hemodynamically stable  Respiratory status: unassisted, spontaneous ventilation and nasal cannula  Hydration status: euvolemic  Follow-up not needed.        Visit Vitals  BP 99/62 (BP Location: Right arm, Patient Position: Lying)   Pulse 110   Temp 36.3 °C (97.4 °F) (Oral)   Resp 18   Ht 5' 7" (1.702 m)   Wt 85.7 kg (189 lb)   SpO2 95%   BMI 29.60 kg/m²       Pain/Jose Score: Pain Assessment Performed: Yes (1/12/2018  2:00 PM)  Presence of Pain: denies (1/12/2018  2:00 PM)  Pain Rating Prior to Med Admin: 6 (1/12/2018  6:03 AM)  Pain Rating Post Med Admin: 0 (1/11/2018  5:20 AM)  Jose Score: 10 (1/12/2018  8:19 AM)      "
Good

## 2018-02-23 NOTE — PATIENT INSTRUCTIONS
[facility]  [address]  [address] [address]  [phone] phone     [phone] fax  Hours of Operation: Monday- Friday 8:00am- 5:00pm  After hours phone  [phone]  Hematology / Oncology Physicians on call      Dr. Zane Muro                        Please call with any concerns regarding your appointment today.  FALL PREVENTION   Falls often occur due to slipping, tripping or losing your balance. Here are ways to reduce your risk of falling again.   Was there anything that caused your fall that can be fixed, removed or replaced?   Make your home safe by keeping walkways clear of objects you may trip over.   Use non-slip pads under rugs.   Do not walk in poorly lit areas.   Do not stand on chairs or wobbly ladders.   Use caution when reaching overhead or looking upward. This position can cause a loss of balance.   Be sure your shoes fit properly, have non-slip bottoms and are in good condition.   Be cautious when going up and down stairs, curbs, and when walking on uneven sidewalks.   If your balance is poor, consider using a cane or walker.   If your fall was related to alcohol use, stop or limit alcohol intake.   If your fall was related to use of sleeping medicines, talk to your doctor about this. You may need to reduce your dosage at bedtime if you awaken during the night to go to the bathroom.   To reduce the need for nighttime bathroom trips:   Avoid drinking fluids for several hours before going to bed   Empty your bladder before going to bed   Men can keep a urinal at the bedside   © 8588-7325 [employer], [address] [place] [address], [place] [address]. All rights reserved. This information is not intended as a substitute for professional medical care. Always follow your healthcare professional's instructions.

## 2018-03-01 NOTE — LETTER
March 1, 2018      Kanika Ferreira MD  [address] [id number]           [facility]  [address] [place] [id number]  Phone: [phone]  Fax: [phone]          Patient: Lv Crocker   MR Number: [id number]   YOB: 1975   Date of Visit: 3/1/2018       Dear Dr. Kanika Ferreira:    Thank you for referring Lv Crockre to me for evaluation. Attached you will find relevant portions of my assessment and plan of care.    If you have questions, please do not hesitate to call me. I look forward to following Lv Crocker along with you.    Sincerely,    Leola Hazel, NP    Enclosure  CC:  No Recipients    If you would like to receive this communication electronically, please contact [email or web address] or [phone] to request more information on [employer] Android POS Link access.    For providers and/or their staff who would like to refer a patient to [facility], please contact us through our one-stop-shop provider referral line, [facility], at [phone].    If you feel you have received this communication in error or would no longer like to receive these types of communications, please e-mail [email or web address]

## 2018-03-01 NOTE — PATIENT INSTRUCTIONS
Visiting a Sleep Clinic    Your provider has scheduled you for a sleep study.   You should be receiving a phone call from the sleep lab shortly after your study has been approved by your insurance. Please make sure you have your current phone numbers in the [facility] system. If you do not hear from anyone in the next 10 -14 business days, please call the sleep lab at [phone] to schedule your sleep study. The sleep studies are performed at [facility] seven nights a week.  When you are scheduling your sleep study, they will also make you a follow up appointment with your provider. This follow up appointment will be 10-14 days after your sleep study to review the results. If it is noted that you do have sleep apnea on your initial sleep study, you may receive a call back for a second night study with the CPAP before you come back to the office.   Are you worried about having a sleep study? Talk to your healthcare provider if you have any concerns. Learn what to expect at the sleep clinic and try to relax before you come.  Before your study  Your healthcare provider will tell you how to prepare. Ask if you should take your usual medicines. Also:  · Avoid napping.  · Avoid caffeine and alcohol.  · Take a shower and wash your hair (dont use hair conditioner, hair spray, and skin lotions).  · Eat dinner before you come to the sleep clinic. Pack a snack if you need one before bedtime.  · Bring what will make you comfortable, such as your pajamas, robe, slippers, hygiene items, and even your own pillow.  What you can expect  When you arrive at the sleep clinic, you will usually be checked in by a . A specialized sleep technologist will meet you in your room. Then you may change into your nightclothes. Small sensors are placed on your head and body with tape and gel. The sensors are then plugged into a machine that will monitor your sleep. If you need to use a restroom, the sensors can  be unplugged. A camera in your room will record your body movements. The technologist will stay in a nearby room. If you need to talk to him or her, use the intercom.  What a sleep study does  A sleep study monitors all the stages of your sleep. To do this, the following are recorded:  · Eye movements  · Heart rate, brain waves, and muscle activity  · Level of oxygen in your blood  · Breathing and snoring  · Sudden leg or body movements  If you have breathing problems, Continuous Positive Airway Pressure (CPAP) may be used. CPAP is a device that can help you breathe by adding mild air pressure to your airway passages and improve your sleep. It may be used during the second half of your study or on another night.  Getting your results  The technologist can answer some of your questions about the sleep study. But only your healthcare provider can explain the results. He or she will have the report of your sleep study within 1 to 2 weeks. Then your treatment options can be discussed with your healthcare provider, or you may be referred to a sleep disorders specialist.  Date Last Reviewed: 8/9/2015 © 2000-2017 [employer]. [address] [place] [address]. All rights reserved. This information is not intended as a substitute for professional medical care. Always follow your healthcare professional's instructions.        What Are Snoring and Obstructive Sleep Apnea?  If youve ever had a stuffed-up nose, you know the feeling of trying to breathe through a very narrow passageway. This is what happens in your throat when you snore. While you sleep, structures in your throat partially block your air passage, making the passage narrow and hard to breathe through. If the entire passage becomes blocked and you cant breathe at all, you have sleep apnea.      Snoring Obstructive sleep apnea   Snoring  If your throat structures are too large or the muscles relax too much during sleep, the air passage may  be partially blocked. As air from the nose or mouth passes around this blockage, the throat structures vibrate, causing the familiar sound of snoring. At times, this sound can be so loud that snorers wake up others, or even themselves, during the night. Snoring gets worse as more and more of the air passage is blocked.  Obstructive sleep apnea  If the structures completely block the throat, air cant flow to the lungs at all. This is called apnea (meaning no breathing). Since the lungs arent getting fresh air, the brain tells the body to wake up just enough to tighten the muscles and unblock the air passage. With a loud gasp, breathing begins again. This process may be repeated over and over again throughout the night, making your sleep fragmented with a lighter stage of sleep. Even though you do not remember waking up many times during the night to a lighter sleep, you feel tired the next day. The lack of sleep and fresh air can also strain your lungs, heart, and other organs, leading to problems such as high blood pressure, heart attack, or stroke.  Problems in the nose and jaw  Problems in the structure of the nose may obstruct breathing. A crooked (deviated) septum or swollen turbinates can make snoring worse or lead to apnea. Also, a receding jaw may make the tongue sit too far back, so its more likely to block the airway when youre asleep.        Date Last Reviewed: 7/18/2015  © 2116-8976 [employer]. [address] [place] [address], [place] [address]. All rights reserved. This information is not intended as a substitute for professional medical care. Always follow your healthcare professional's instructions.        Continuous Positive Air Pressure (CPAP)     A mask over the nose gently directs air into the throat to keep the airway open.   Continuous positive air pressure (CPAP) uses gentle air pressure to hold the airway open. CPAP is often the most effective treatment for sleep apnea and severe  snoring. It works very well for many people. But keep in mind that it can take several adjustments before the setup is right for you.  How CPAP works  The CPAP machine  is a small portable pump beside the bed. The pump sends air through a hose, which is held over your nose and mouth by a mask. Mild air pressure is gently pushed through your airway. The air pressure nudges sagging tissues aside. This widens the airway so you can breathe better. CPAP may be combined with other kinds of therapy for sleep apnea.  Types of air pressure treatments  There are different types of CPAP. Your doctor or CPAP technician will help you decide which type is best for you:  · Basic CPAP keeps the pressure constant all night long.  · A bilevel device (BiPAP) provides more pressure when you breathe in and less when you breathe out. A BiPAP machine also may be set to provide automatic breaths to maintain breathing if you stop breathing while sleeping.  · An autoCPAP device automatically adjusts pressure throughout the night and in response to changes such as body position, sleep stage, and snoring.  Date Last Reviewed: 8/10/2015  © 7465-3227 [employer]. [address] [place] [address], [place] [address]. All rights reserved. This information is not intended as a substitute for professional medical care. Always follow your healthcare professional's instructions.

## 2018-03-01 NOTE — PROGRESS NOTES
Subjective:      Patient ID: Lv Crocker is a 42 y.o. male.    Patient Active Problem List   Diagnosis    Current use of steroid medication    Need for prophylactic immunotherapy    Tricuspid valve disorders, specified as nonrheumatic    Anxiety    PVD (peripheral vascular disease)    GERD (gastroesophageal reflux disease)    Debility    Sciatica    Disorder of magnesium metabolism    Subacute effusive constrictive pericarditis    Heart transplanted    Chronic kidney disease (CKD), stage III (moderate)    History of restrictive cardiomyopathy    CMV infection, acute    Hypothyroidism due to Hashimoto's thyroiditis    Thoracic compression fracture    Type 1 diabetes mellitus with hyperglycemia    Anemia of chronic renal failure, stage 3 (moderate)    Transplant follow-up    Type 1 diabetes mellitus with stage 3 chronic kidney disease    Hyperlipidemia LDL goal <70    Iron deficiency anemia    Other ascites    Osteopenia    Fatigue    Positive urine culture    Pleural effusion    Diarrhea    Cough, persistent     Problem list has been reviewed.    he has been referred by Kanika Ferreira MD for evaluation and management for   Chief Complaint   Patient presents with    Sleep Apnea     Chief Complaint: Sleep Apnea    HPI:  He presents for a sleep evaluation referred by cardiologist, Dr. Ferreira.   Patient has observed snoring, feels sleepy during the day, take naps during the day.  Patient reports non restful sleep, reports never feels refreshed.   He denies morning headache.   He reports day time napping; duration 1 - 2.5 Hours  He denies recent weight gain.  Cardiovascular risk factors: heart transplant   Bed time is 1000 - 1030   Wake time is 0600 - 0630, stays up for about 2 hrs then back to bed for about 2 hrs sleep. Then additional naps around 1-300pm most of the week.  Sleep onset is within 1 Hour when tries to go to bed with wife around 1000 - 1030.   Sleep maintenance  difficulties related to non-restful sleep, even before heart disease with subsequent transplant he had difficulty sleeping at night (took Ambien before and after transplant in 2014, stopped Ambien after hospital discharge for transplant, never found helped).   Wake after sleep onset occurs three - four times a night.  Nocturia occurs three - four times a night,   Sleep aids :  NO , as stated above tried Ambien in past, no significant improvement  Dry mouth : YES, usually awakens every morning  Sleep walking:  NO  Sleep talking :  NO  Sleep eating: NO  Vivid Dreams :  NO  Cataplexy :  NO    Vina sleepiness score was 6.  Neck circumference is 42 cm. (16.5 inches).  Mallampati score 2    Disabled in 2011 related heart dysfunction.  Employed prior as educator/.     STOP - BANG Questionnaire:   1. Snoring : Do you snore loudly ?     YES  2. Tired : Do you often feel tired, fatigued, or sleepy during daytime?   YES  3. Observed: Has anyone observed you stop breathing during your sleep?    NO  4. Blood pressure : Do you have or are you being treated for high blood pressure?   NO  5. BMI :BMI more than 35 kg/m2?   NO  6. Age : Age over 50 yr old?   NO  7. Neck circumference: Neck circumference greater than 40 cm?    YES  8. Gender: Gender male?    YES    SCORE: 4    High risk of NEVIN: Yes 5 - 8  Intermediate risk of NEVIN: Yes 3 - 4  Low risk of NEVIN: Yes 0 - 2    Previous Report Reviewed: lab reports and office notes     Past Medical History: The following portions of the patient's history were reviewed and updated as appropriate:   He  has a past surgical history that includes Foot split transfer of the posterior tibialis tendon procedure; Knee surgery; Cardiac surgery; Cardiac catheterization; Coronary angioplasty; stent placemnet; Cholecystectomy; s/p oht (November 2014); Heart transplant (2014); and PleuRx catheter placement (01/11/2018).  His family history includes Cancer (age of onset: 50) in his  brother; Diabetes in his brother, father, maternal grandfather, maternal grandmother, maternal uncle, mother, paternal aunt, sister, and son; Heart disease in his brother and mother; Hypertension in his brother, father, and mother; Kidney disease in his father and mother; Stroke in his brother and father; Vision loss in his brother.  He  reports that he has never smoked. He has never used smokeless tobacco. He reports that he does not drink alcohol or use drugs.  He has a current medication list which includes the following prescription(s): aspirin, benzonatate, escitalopram oxalate, gabapentin, insulin aspart u-100, insulin detemir u-100, lancets, levothyroxine, magnesium oxide, mycophenolate, nortriptyline, ondansetron, pantoprazole, pen needle, diabetic, pravastatin, prednisone, tacrolimus, tamsulosin, and torsemide.  He is allergic to no known drug allergies..    Review of Systems   Constitutional: Negative for fever, chills, weight loss, weight gain, activity change, appetite change, fatigue and night sweats.   HENT: Negative for postnasal drip, rhinorrhea, sinus pressure, voice change and congestion.    Eyes: Negative for redness and itching.   Respiratory: Negative for snoring, cough, sputum production, chest tightness, shortness of breath, wheezing, orthopnea, asthma nighttime symptoms, dyspnea on extertion, use of rescue inhaler and somnolence.    Cardiovascular: Negative.  Negative for chest pain, palpitations and leg swelling.   Genitourinary: Negative for difficulty urinating and hematuria.   Endocrine: Negative for cold intolerance and heat intolerance.    Musculoskeletal: Negative for arthralgias, gait problem, joint swelling and myalgias.   Skin: Negative.    Gastrointestinal: Negative for nausea, vomiting, abdominal pain and acid reflux.   Neurological: Negative for dizziness, weakness, light-headedness and headaches.   Hematological: Negative for adenopathy. No excessive bruising.   All other  "systems reviewed and are negative.     Objective:     Physical Exam   Constitutional: He is oriented to person, place, and time. He appears well-developed and well-nourished. He is active and cooperative.  Non-toxic appearance. He does not appear ill. No distress.   HENT:   Head: Normocephalic and atraumatic.   Right Ear: External ear normal.   Left Ear: External ear normal.   Nose: Nose normal.   Mouth/Throat: Oropharynx is clear and moist. No oropharyngeal exudate.   Neck circumference is 42 cm. (16.5 inches).  Mallampati score 2     Eyes: Conjunctivae are normal.   Neck: Normal range of motion. Neck supple.   Cardiovascular: Normal rate, regular rhythm, normal heart sounds and intact distal pulses.    Pulmonary/Chest: Effort normal and breath sounds normal. He has no wheezes. He has no rales.   Abdominal: Soft. Bowel sounds are normal.   Musculoskeletal: Normal range of motion. He exhibits no edema.   Neurological: He is alert and oriented to person, place, and time. He has normal reflexes.   Skin: Skin is warm and dry.   Psychiatric: He has a normal mood and affect. His behavior is normal. Judgment and thought content normal.   Vitals reviewed.    /60 (BP Location: Right arm, Patient Position: Sitting)   Pulse 100   Resp 18   Ht 5' 7.2" (1.707 m)   Wt 82.6 kg (181 lb 15.8 oz)   SpO2 (!) 94%   BMI 28.33 kg/m²     Personal Diagnostic Review  Review of labs, xray's, cardiology reports.     Assessment:     1. Sleep-disordered breathing    2. At risk for obstructive sleep apnea    3. Snoring    4. Chronic kidney disease (CKD), stage III (moderate)    5. Anemia of chronic renal failure, stage 3 (moderate)    6. Type 1 diabetes mellitus with stage 3 chronic kidney disease    7. Heart transplanted    8. Inadequate sleep hygiene      Orders Placed This Encounter   Procedures    Polysomnogram (CPAP will be added if patient meets diagnostic criteria.)     Standing Status:   Future     Standing Expiration Date: "   3/1/2019     Plan:     Discussed diagnosis, its evaluation, treatment and usual course. All questions answered.    Problem List Items Addressed This Visit     Anemia of chronic renal failure, stage 3 (moderate)    Relevant Orders    Polysomnogram (CPAP will be added if patient meets diagnostic criteria.)    Chronic kidney disease (CKD), stage III (moderate)    Relevant Orders    Polysomnogram (CPAP will be added if patient meets diagnostic criteria.)    Heart transplanted    Relevant Orders    Polysomnogram (CPAP will be added if patient meets diagnostic criteria.)    Type 1 diabetes mellitus with stage 3 chronic kidney disease    Relevant Orders    Polysomnogram (CPAP will be added if patient meets diagnostic criteria.)      Other Visit Diagnoses     Sleep-disordered breathing    -  Primary    Relevant Orders    Polysomnogram (CPAP will be added if patient meets diagnostic criteria.)    At risk for obstructive sleep apnea        Relevant Orders    Polysomnogram (CPAP will be added if patient meets diagnostic criteria.)    Snoring        Relevant Orders    Polysomnogram (CPAP will be added if patient meets diagnostic criteria.)    Inadequate sleep hygiene            Follow-up for Sleep Study Follow Up.

## 2018-03-07 NOTE — TELEPHONE ENCOUNTER
GUS called and spoke with pt about his referral to [facility] in [place]. GUS explained there is no hematology clinic locally (although there is one at [facility] in [place]), so he will need to establish care with PCP for his Procrit injections. SW provide phone number to main clinic and the number to Theresa in hopes that she can help get him seen ASAP.     GUS left message on Theresa's vm indicating pt's need to be seen ASAP and asked if she could assist in getting him in soon. GUS asked Theresa to call back if additional info is needed.     SW will f/u as needed.

## 2018-03-08 NOTE — TELEPHONE ENCOUNTER
Pt's wife sent message that pt is still having diarrhea from switching to [employer] from [employer]. Consulted with Dr. Ferreira who ordered change back to [employer].Sent a message to pt and wife regarding change back to [employer] from [employer] ok with Dr. Ferreira.

## 2018-03-09 NOTE — TELEPHONE ENCOUNTER
"Per post transplant coordinator, pt's wife has reported pt is unable to receive his Procrit shot from [facility] hematologist. GUS spoke with pt's wife Elizabeth by phone, who states pt has been "blocked" due to defaulting on payment plan for medical expenses. Pt's wife states a monthly payment of $65 was being automatically withdrawn from patient's bank account. Pt's medical bill significantly increased due to a hospital stay and patient's monthly payment went up to over $300 per month without the pt or wife's knowledge. Pt's wife says they defaulted on their payment when the amount being automatically withdrawn did not cover the new monthly payment. Pt's wife states they cannot afford new monthly payment and have not been able to work out a new plan with hematology  Elizabeth Bell. Wife states they do not qualify for financial assistance because their yearly income is too high.     Heart Transplant GUS reached out to oncology GUS Moreno ph [phone] and confirmed pt has been referred to [facility] for hematology follow up. Tiffanie states [facility] does not have a hematology department, but will follow pt's on a case by case basis. If [facility] in  cannot follow pt, they will refer to [facility], which does have a hematology department. Tiffanie states she has spoken to pt and provided him with above information, including contact info to schedule appointment.     Additionally, Tiffanie reports she has spoken at length with  and confirmed all options for providing financial help to this pt have been exhausted. Tiffanie states she sent an email to head of [facility] financial coordinators, Tino Mcintyre, as a last attempt at providing assistance to this pt.     GUS updated post transplant coordinator. GUS remains available.   "

## 2018-03-11 NOTE — ED TRIAGE NOTES
Presents with c/o diarrhea x 1 month, prod cough with clear production. Admitted Dec and Feb with pneumonia. Headache x 2 days, short of breath and lightheaded when coughing. Fell yesterday, was lowered to floor by wife.

## 2018-03-12 PROBLEM — R05.9 COUGH: Status: ACTIVE | Noted: 2018-01-01

## 2018-03-12 PROBLEM — I42.5 RESTRICTIVE CARDIOMYOPATHY: Status: ACTIVE | Noted: 2018-01-01

## 2018-03-12 PROBLEM — D72.819 LEUKOPENIA: Status: ACTIVE | Noted: 2018-01-01

## 2018-03-12 NOTE — SUBJECTIVE & OBJECTIVE
Interval HPI:   Overnight events:  AFVSS.    Patient's wife confirmed dose of LT4 of 150mcg at home.   Patient has no complaints.  Primary team investigating cough and diarrhea.    Received Levemir 10u once.  No scheduled insulin.    PMH, PSH, FH, SH updated and reviewed     ROS:  Review of Systems   Constitutional: Negative for unexpected weight change.   Eyes: Negative for visual disturbance.   Respiratory: Negative for shortness of breath.    Cardiovascular: Negative for chest pain.   Gastrointestinal: Positive for diarrhea. Negative for abdominal pain, blood in stool, nausea and vomiting.   Genitourinary: Negative for urgency.   Musculoskeletal: Negative for arthralgias.   Skin: Negative for wound.   Neurological: Negative for headaches.   Hematological: Does not bruise/bleed easily.   Psychiatric/Behavioral: Negative for sleep disturbance.       Current Medications and/or Treatments Impacting Glycemic Control  Immunotherapy:    Immunosuppressants         Stop Route Frequency     tacrolimus capsule 3 mg      -- Oral 2 times daily     mycophenolate EC tablet 720 mg      -- Oral 2 times daily        Steroids:   Hormones     Start     Stop Route Frequency Ordered    03/12/18 0900  predniSONE tablet 2.5 mg      -- Oral Daily 03/12/18 0023        Pressors:    Autonomic Drugs     None        Hyperglycemia/Diabetes Medications:   Antihyperglycemics     Start     Stop Route Frequency Ordered    03/12/18 1318  insulin aspart U-100 pen 0-5 Units      -- SubQ Before meals & nightly PRN 03/12/18 1218             PHYSICAL EXAMINATION:  Vitals:    03/12/18 1548   BP:    Pulse: 105   Resp:    Temp:      Body mass index is 27.25 kg/m².    Physical Exam   Constitutional: He appears well-developed.   HENT:   Right Ear: External ear normal.   Left Ear: External ear normal.   Nose: Nose normal.   Hearing normal  Dentition good   Neck: No tracheal deviation present. No thyromegaly present.   Cardiovascular: Normal rate.    No murmur  heard.  Pulmonary/Chest: Effort normal and breath sounds normal.   Abdominal: Soft. There is no tenderness. No hernia.   Musculoskeletal: He exhibits no edema.   Neurological: He has normal reflexes. No cranial nerve deficit.   Skin: No rash noted.   No nodules   Psychiatric: He has a normal mood and affect. Judgment normal.   Vitals reviewed.

## 2018-03-12 NOTE — PROGRESS NOTES
Admit Note     Met with patient and spouse to assess needs. Patient is a 42 y.o.  male, admitted due to diarrhea and cough.   Pt received heart transplant 11/18/2014.      Patient admitted from emergency on 3/11/2018 .  At this time, patient presents as alert and oriented x 4.  At this time, patients caregiver presents as alert and oriented x 4, good eye contact and communicative.    Household/Family Systems     Patient resides with patient's spouse and two children ages 15 and 20, at     [address] [place] [address].      Support system includes spouse, children and extended family.     Patient does have dependents that are in need of being cared for. Patient's 15 yr old are being cared for by family.     Patients primary caregiver is Elizabeth Crocker , patients spouse, phone number [phone].    Pt's cell:  [phone]  Additional emergency contacts:  David Crocker (brother) [phone]  Annalisa Navas (mother in law) [phone]    During admission, patient's caregiver plans to stay in patient's room.  Confirmed patient and patients caregivers do have access to reliable transportation.    Cognitive Status/Learning     Patient reports reading ability as college and states patient does not have difficulty with reading, writing, seeing, hearing, comprehension and learning. Pt reports issues with short term memory.   Patient reports patient learns best by one on one.   Needed: No.   Highest education level: college    Vocation/Disability   .  Working for Income: No  If no, reason not working: Disability  Patient is disabled due to heart failure since 2011.  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  Pt's spouse works full time at [employer] in [employer].    Adherence     Patient reports a high level of adherence to patients health care regimen.  Adherence counseling and education provided. Patient verbalizes  understanding.    Substance Use    Patient reports the following substance usage.    Tobacco: none, patient denies any use.  Alcohol: none, patient denies any use.  Illicit Drugs/Non-prescribed Medications: none, patient denies any use.  Patient states clear understanding of the potential impact of substance use.  Substance abstinence/cessation counseling, education and resources provided and reviewed.     Services Utilizing/ADLS    Infusion Service: Prior to admission, patient utilizing? no  Home Health: Prior to admission, patient utilizing? no Pt has used [facility] in the past  DME: Prior to admission, no  Pulmonary/Cardiac Rehab: Prior to admission, no Pt has gone to [facility] in the past  Dialysis:  Prior to admission, no  Transplant Specialty Pharmacy:  Prior to admission, no.    Prior to admission, patient reports patient was mostly  independent with ADLS and was not driving, due to feeling light headed and weakness.  Pt's spouse reports the pt now wears a Life Alert Button.   Pt's spouse drives.  Patient reports patient is not able to care for self at this time due to compromised medical condition (as documented in medical record) and physical weakness..  Patient indicates a willingness to care for self once medically cleared to do so.    Insurance/Medications    Insured by   Payor/Plan Subscr  Sex Relation Sub. Ins. ID Effective Group Num   1. BCBS MGD MEDI* DEISYDARRELL M* 1975 Male  [id number]                                   [address] [address]      Primary Insurance (for UNOS reporting): Public Insurance - Medicare FFS (Fee For Service)  Secondary Insurance (for UNOS reporting): None    Patient reports patient is not sure if he will be able to obtain and afford medications at this time and at time of discharge.  Pt and spouse reports multiple issues with insurance, billing and payment plans.      Living Will/Healthcare Power of     Patient states patient  has a LW and/or HCPA. Pt reports spouse is the HCPA.    provided education regarding LW and HCPA and the completion of forms.    Coping/Mental Health    Patient is not coping well, due to medical and insurance issues.  Patient indicates mental health difficulties. The pt reports issues with anxiety and depression.  The pt is taking Lexapro for depression.  Pt's spouse became very tearful during assessment due to complications with insurance and billing.  Pt and spouse report feelings of abandonment and are concerned about pt's healthcare future.  Pt's spouse reports she is waiting for call backs from the [facility] medical system and [facility] pt relation department.  Worker provided support.      Discharge Planning    At time of discharge, patient plans to return to patient's home under the care of spouse.  Patients spouse will transport patient.  Per rounds today, expected discharge date has not been medically determined at this time. Patient and patients caregiver  verbalize understanding and are involved in treatment planning and discharge process.    Additional Concerns    Patient is being followed for needs, education, resources, information, emotional support, supportive counseling, and for supportive and skilled discharge plan of care.  providing ongoing psychosocial support, education, resources and d/c planning as needed.  SW remains available. Patient's caregiver verbalizes understanding and agreement with information reviewed,  availability and how to access available resources as needed. Patient verbalizes understanding and agreement with information reviewed, social work availability, and how to access available resources as needed.

## 2018-03-12 NOTE — ASSESSMENT & PLAN NOTE
- CXR on admit showed moderate right pleural effusion stable  - Sinus X ray on admit showed partial opacification of the maxillary sinuses, but no large air-fluid level is identified  - No O2 desats noted  - Will check PFT's  - Continue PPI  - Antitussives  - Consult ENT

## 2018-03-12 NOTE — ED PROVIDER NOTES
"SCRIBE #1 NOTE: I, Sen Johns, am scribing for, and in the presence of,  Dr. Polk. I have scribed the entire note.    CC: Diarrhea and Cough      HPI: Lv Crocker is a 42 y.o. year old male with PMHx of heart transplant who presents to the ED complaining of cough, which began a few months ago, but worsened significantly within the last week. Cough is productive of clear sputum. Pt had a syncopal episode 1 day ago following excessive coughing episode. No head trauma or injury upon loosing consciousness. He reports recent hospital admissions for pneumonia in December and February. He notes an associated episodic shortness of breath that began in December and lasts for 1 minute at a time. Pt reports lightheadedness upon standing. No fever. Pt also complains of diarrhea that has persisted for 1 month. He states he has 10 episodes of diarrhea per day. Diarrhea began after his medication regimen was changed from myfortic to CellCept. He was changed back to myfortic 3 days ago. No blood in stool, dysuria, or hematuria. He denies any history of atrial fibrillation.     The history is provided by the patient.       Past Medical History:   Diagnosis Date    Arthritis     Ascites     Thought to be 2/2 heart tpx; liver bx 3/31/17 w/o significant fibrosis    Cardiomyopathy     Depression     Controlled "for the most part" on Lexipro    Encounter for blood transfusion     during transplant    GERD (gastroesophageal reflux disease)     Hashimoto's disease     History of CHF (congestive heart failure)     Previously EF 20% (prior to heart transplant); last EF 60%, PAP 41 as of 12/12/17; throught to be 2/2 genetics & DM1    History of coronary artery disease     H/o Coronary artery disease; resolved since heart transplant 2014    History of myocardial infarction     h/o MI x3 prior to heart transplant    HLD (hyperlipidemia) 6/11/2015    Hypoglycemia unawareness in type 1 diabetes mellitus     Hypothyroid  "    Initially hyperthyroid 2/2 Hoshimoto's thyroiditis; now on levothyroxine 150 mcg qd    Pleural effusion due to another disorder     Thought to be 2/2 heart tpx; s/p PleuRx catheter placement and removal after 1-2 months    Pulmonary hypertension assoc with unclear multi-factorial mechanisms 12/12/2017    PAP 41 (EF 60%) on ECHO 12/12/17    Syncope 6/30/2015    Type I diabetes mellitus, well controlled     Well controlled; Dx'd @5y/o; on N insulin 20U BID & Humalog 10U w/meals +SSI; Glu 89 11/9/17; A1c 7.2% 4/5/17    Unspecified essential hypertension 05/29/2014    Well controlled; Last /57 on 11/9/17     Past Surgical History:   Procedure Laterality Date    CARDIAC CATHETERIZATION      CARDIAC SURGERY      CHOLECYSTECTOMY      CORONARY ANGIOPLASTY      FOOT SPLIT TRANSFER OF THE POSTERIOR TIBIALIS TENDON PROCEDURE      HEART TRANSPLANT  2014    KNEE SURGERY      PleuRx catheter placement  01/11/2018    Plan for removal after 1-2 months by Dr. James in cardiothoracic surgery    s/p oht  November 2014    stent placemnet       Family History   Problem Relation Age of Onset    Heart disease Mother     Kidney disease Mother     Diabetes Mother     Hypertension Mother     Kidney disease Father     Diabetes Father     Hypertension Father     Stroke Father     Heart disease Brother     Stroke Brother     Diabetes Brother     Cancer Brother 50     pancreatic    Vision loss Brother     Hypertension Brother     Diabetes Son     Diabetes Sister     Diabetes Maternal Uncle     Diabetes Paternal Aunt     Diabetes Maternal Grandmother     Diabetes Maternal Grandfather      No current facility-administered medications on file prior to encounter.      Current Outpatient Prescriptions on File Prior to Encounter   Medication Sig Dispense Refill    aspirin (ECOTRIN) 81 MG EC tablet Take 1 tablet (81 mg total) by mouth once daily. 30 tablet 11    escitalopram oxalate (LEXAPRO) 20 MG  tablet Take 1 tablet (20 mg total) by mouth once daily. 30 tablet 11    gabapentin (NEURONTIN) 300 MG capsule Take 3 capsules (900 mg total) by mouth 3 (three) times daily. 90 capsule 1    insulin aspart (NOVOLOG) 100 unit/mL InPn pen Inject 4 Units into the skin 3 (three) times daily. 3.6 mL 11    insulin detemir (LEVEMIR FLEXTOUCH) 100 unit/mL (3 mL) SubQ InPn pen Inject 15 Units into the skin every evening. (Patient taking differently: Inject 15 Units into the skin 2 (two) times daily. ) 4.5 mL 11    lancets Misc 1 Device by Misc.(Non-Drug; Combo Route) route 4 (four) times daily with meals and nightly. 100 each 5    levothyroxine (SYNTHROID) 150 MCG tablet Take 1 tablet (150 mcg total) by mouth every morning. 30 tablet 12    magnesium oxide (MAG-OX) 400 mg tablet Take 1 tablet (400 mg total) by mouth once daily. 30 tablet 11    mycophenolate (MYFORTIC) 360 MG TbEC Take 2 tablets (720 mg total) by mouth 2 (two) times daily. 120 tablet 11    nortriptyline (PAMELOR) 25 MG capsule Take 1 capsule (25 mg total) by mouth every evening. 30 capsule 3    ondansetron (ZOFRAN-ODT) 4 MG TbDL Take 2 tablets (8 mg total) by mouth every 8 (eight) hours as needed (nausea). 15 tablet 0    pantoprazole (PROTONIX) 40 MG tablet TAKE ONE TABLET BY MOUTH EVERY DAY 30 tablet 11    pravastatin (PRAVACHOL) 40 MG tablet Take 1 tablet (40 mg total) by mouth every evening. (Patient taking differently: Take 40 mg by mouth once daily. ) 30 tablet 11    predniSONE (DELTASONE) 2.5 MG tablet Take 1 tablet (2.5 mg total) by mouth once daily. S/P Heart Transplant 11/18/2014 ICD-10 Z94.1 30 tablet 11    tacrolimus (PROGRAF) 1 MG Cap Taked 3mg orally in the morning and 3mg orally in the evening. S/p heart transplant  11/18/2014  ICD-10 Z94.1 60 capsule 0    tamsulosin (FLOMAX) 0.4 mg Cp24 TAKE 2 CAPSULES(0.8 MG) BY MOUTH EVERY EVENING 60 capsule 11    torsemide (DEMADEX) 20 MG Tab Take 2 tablets (40 mg total) by mouth once daily. 60  "tablet 11    pen needle, diabetic 32 gauge x 5/32" Ndle Uses 5 pen needles a day 200 each 12     No known drug allergies  Social History     Social History    Marital status:      Spouse name: N/A    Number of children: N/A    Years of education: N/A     Social History Main Topics    Smoking status: Never Smoker    Smokeless tobacco: Never Used    Alcohol use No    Drug use: No    Sexual activity: Yes     Partners: Female     Other Topics Concern    None     Social History Narrative            Breakfast: Skips 80%; cereal; Diet Sprite    Lunch: Turkey or chicken sandwich on white bread; Diet Sprite    Dinner: Grilled burgers, small amount chips; Diet Sprite    Snacks: Ronny crackers before bed on most nights    Eats out: 2x/wk    Water: 0    PA: Walks 15 minutes (strenuous) daily    Goal weight 170-175 lbs by 1/2018       ROS:     Constitutional :generalized weakness  HEENT neg  Resp positive for cough, shortness of breath  Cardiac  neg  GI positive for diarrhea    neg  Neuro positive for lightheadedness and syncope  Heme/Immune: neg  Endo neg  Skin neg    PHYSICAL EXAM:  Vitals:    03/12/18 1330   BP: (!) 94/59   Pulse: (!) 119   Resp: 18   Temp: 98.6 °F (37 °C)         PHYSICAL EXAM:   general: pale, fatigued  VS: triage VS reviewed  HEENT: NC/AT, PERRL, EOMI  Neck: trachea midline  CV: RRR, no LE pitting edema, warm extremities  Resp: Decreased breath sounds on the right   ABD:  ND, + normal BS, RLQ tenderness to palpation, no peritoneal signs   Renal: No CVAT  Neuro: AAO x 3, 5/5 muscle strength in upper and lower extremities, sensation grossly intact, face symmetric, speech normal  Ext: no deformity, +2 symmetrical peripheral pulses, no pitting edema in the lower extremities   Skin: pale         DATA & INTERVENTIONS:    LABS reviewed:  Labs Reviewed   BASIC METABOLIC PANEL - Abnormal; Notable for the following:        Result Value    CO2 21 (*)     Glucose 166 (*)     BUN, Bld 35 (*)  "    Creatinine 3.4 (*)     eGFR if  24.0 (*)     eGFR if non  20.8 (*)     All other components within normal limits   COMPREHENSIVE METABOLIC PANEL - Abnormal; Notable for the following:     CO2 21 (*)     Glucose 166 (*)     BUN, Bld 35 (*)     Creatinine 3.4 (*)     Albumin 3.2 (*)     Total Bilirubin 1.3 (*)     Alkaline Phosphatase 317 (*)     ALT 9 (*)     eGFR if  24.0 (*)     eGFR if non  20.8 (*)     All other components within normal limits   CBC W/ AUTO DIFFERENTIAL - Abnormal; Notable for the following:     WBC 3.30 (*)     RBC 2.78 (*)     Hemoglobin 8.2 (*)     Hematocrit 25.1 (*)     Immature Granulocytes 0.9 (*)     Lymph # 0.4 (*)     Lymph% 12.1 (*)     Mono% 20.9 (*)     All other components within normal limits    Narrative:     ADD-ON CBCWD #[id number] PER YANIV POLK MD 20:43  03/11/2018   ADD-ON BNP #[id number] PER YANIV POLK MD 20:44  03/11/2018    B-TYPE NATRIURETIC PEPTIDE - Abnormal; Notable for the following:      (*)     All other components within normal limits    Narrative:     ADD-ON CBCWD #[id number] PER YANIV POLK MD 20:43  03/11/2018   ADD-ON BNP #[id number] PER YANIV POLK MD 20:44  03/11/2018    POCT GLUCOSE - Abnormal; Notable for the following:     POCT Glucose 171 (*)     All other components within normal limits   CLOSTRIDIUM DIFFICILE   CULTURE, STOOL   ENTEROHEMORRHAGIC E.COLI   LACTIC ACID, PLASMA   TROPONIN I   CBC W/ AUTO DIFFERENTIAL   B-TYPE NATRIURETIC PEPTIDE   GIARDIA/CRYPTOSPORIDIUM (EIA)   NOROVIRUS GPOUP 1 & 2, DETECTION BY RT-PCR   OCCULT BLOOD X 1, STOOL   ROTAVIRUS ANTIGEN, STOOL   STOOL EXAM-OVA,CYSTS,PARASITES   WBC, STOOL   OCCULT BLOOD X 1, STOOL   NOROVIRUS GPOUP 1 & 2, DETECTION BY RT-PCR    Narrative:     Add on per Dr Polk order #[id number] 03/11/2018  23:00 NOPCR   GIARDIA/CRYPTOSPORIDIUM (EIA)   STOOL EXAM-OVA,CYSTS,PARASITES       RADIOLOGY  reviewed:  Imaging Results          X-Ray Sinuses Min 3 Views (Final result)  Result time 03/11/18 21:52:56    Final result by Varghese Romano MD (03/11/18 21:52:56)                 Impression:     Four views of the paranasal sinuses.  Partial opacification of the maxillary sinuses. The frontal and sphenoid sinuses are essentially clear. No large air-fluid level is identified. CT would provide improved sensitivity if there is persistent concern.        Electronically signed by: Varghese Romano  Date:     03/11/18  Time:    21:52              Narrative:    COMPARISON: None.    FINDINGS                             X-Ray Abdomen Flat And Erect (Final result)  Result time 03/11/18 20:23:11    Final result by Varghese Romano MD (03/11/18 20:23:11)                 Impression:    Nonobstructive bowel gas pattern.      Electronically signed by: Varghese Romano  Date:     03/11/18  Time:    20:23              Narrative:    EXAM: ABDOMEN FLAT AND UPRIGHT    COMPARISON:  None.    TECHNIQUE: Flat and upright views of the abdomen were obtained.    FINDINGS:  No dilated bowel loops are seen.   There is no evidence of mass effect. No evidence of pneumoperitoneum. Calcified vas deferens noted. Surgical clips overlie the right upper quadrant. No acute bony abnormality.                             X-Ray Chest PA And Lateral (Final result)  Result time 03/11/18 20:25:21    Final result by Varghese Romano MD (03/11/18 20:25:21)                 Impression:     Multiple dense radiopaque wires overlie the lower chest and upper abdomen with near complete obscuration of the medial right lower lobe, limiting evaluation.  Cardiac silhouette is stable. There are postoperative changes of prior median sternotomy. Essentially stable moderate right-sided pleural effusion and right basilar airspace disease. No detrimental change.          Electronically signed by: Varghese Romano  Date:     03/11/18  Time:    20:25              Narrative:     EXAM:  2 VIEW CHEST RADIOGRAPH.     CLINICAL INDICATION:  Cough.    COMPARISON: 02/19/2018.    TECHNIQUE:  Two views of the chest were obtained.     FINDINGS                              MEDICATIONS/FLUIDS:  Medications   mycophenolate EC tablet 720 mg (720 mg Oral Given 3/12/18 0833)   escitalopram oxalate tablet 20 mg (20 mg Oral Given 3/12/18 0833)   aspirin EC tablet 81 mg (81 mg Oral Given 3/12/18 0833)   gabapentin capsule 900 mg (900 mg Oral Given 3/12/18 0832)   tacrolimus capsule 3 mg (3 mg Oral Given 3/12/18 0832)   tamsulosin 24 hr capsule 0.4 mg (0.4 mg Oral Given 3/12/18 0105)   predniSONE tablet 2.5 mg (2.5 mg Oral Given 3/12/18 0833)   ondansetron disintegrating tablet 8 mg (8 mg Oral Given 3/12/18 1340)   levothyroxine tablet 150 mcg (150 mcg Oral Given 3/12/18 0700)   nortriptyline capsule 25 mg (0 mg Oral Hold 3/12/18 0030)   pravastatin tablet 40 mg (40 mg Oral Given 3/12/18 0832)   pantoprazole EC tablet 40 mg (40 mg Oral Given 3/12/18 0833)   sodium chloride 0.9% flush 3 mL ( Intravenous Canceled Entry 3/12/18 1400)   enoxaparin injection 30 mg (not administered)   0.9%  NaCl infusion ( Intravenous New Bag 3/12/18 1016)   promethazine-codeine 6.25-10 mg/5 ml syrup 5 mL (5 mLs Oral Given 3/12/18 1025)   benzonatate capsule 200 mg (not administered)   glucose chewable tablet 16 g (not administered)   glucose chewable tablet 24 g (not administered)   dextrose 50% injection 12.5 g (not administered)   dextrose 50% injection 25 g (not administered)   glucagon (human recombinant) injection 1 mg (not administered)   insulin aspart U-100 pen 0-5 Units (not administered)   polyethylene glycol (GoLYTELY) solution (not administered)   sodium chloride 0.9% bolus 500 mL (0 mLs Intravenous Stopped 3/11/18 2113)   sodium chloride 0.9% bolus 500 mL (0 mLs Intravenous Stopped 3/11/18 2215)   acetaminophen tablet 650 mg (650 mg Oral Given 3/12/18 1341)     EKG: heart rate of 114, sinus tachycardia, diffuse t wave  inversion in the inferior and anterolateral leads new in V1 and V2, old in all other leads    MDM: 41 yo M s/p heart transplant 2014 w/ recent hospitalizations for pneumonia, diarrhea presents w/ diarrhea, intermittent dyspnea and persistent coigh.  Patietn in sinus tach and soft BP on arrival. High suspicion for dehydration, started w/ 500 ml NS Iv. Blood work, stool cultures/c.diff ordered  BP and HR improved after the initial 500 ml bolus, patietn feels well, no worsening SOB.2nd bolus 500 ml NS  Cardiology consulted    CKD, Cr at baseline. Lactate wnl, trop neg, mild neutropenia. cxr w/ stable rt pleural effusion    patient admitted by cardiology    Consults:Cardiology (appreciate consult and recs)    I, Sen Johns, am scribing for, and in the presence of, Dr. Polk. I performed the above scribed service and the documentation accurately describes the services I performed. I attest to the accuracy of the note.       IMPRESSION:  1.) Diarrhea  2.) Cough  3) dehydration    Dispo:   Admission    Critical Care Time: N/A    I, Dr. Polk, personally performed the services described in this documentation. All medical record entries made by the scribe were at my direction and in my presence.  I have reviewed the chart and agree that the record reflects my personal performance and is accurate and complete.         Abeba Polk MD  03/12/18 4023

## 2018-03-12 NOTE — ASSESSMENT & PLAN NOTE
Differential includes infectious: CMV vs Norvirus/Rotavirus/Giardia/Cdiff, vs. Medication related vs. Thyroid related  -Stool studies pending  -TSH  -If above work up negative and persistent then will discuss with GI.

## 2018-03-12 NOTE — H&P
[facility]  Cardiology  History and Physical     Patient Name: Lv Crocker  MRN: [id number]  Admission Date: 3/11/2018  Code Status: Prior   Attending Provider: Abeba Polk MD   Primary Care Physician: Philippe Mohr MD  Principal Problem:Diarrhea    Patient information was obtained from ER records.     Subjective:     Chief Complaint:  Diarrhea/Cough     HPI:  41 yo gentleman with hashimoto's thyroidits, DM1 on insulin, s/p OHTX 11/14 complicated by AMR, treated 04/15 with 5 days PP, IVIG x 2 doses, was scheduled for Rituximab on 5/1/15 but developed pancytopenia in April 2015 after which he got CMV and c. Dif at the same time.  Angiogram 2/16 with IVUS showed no evidence of CAV. 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 He was getting monthly paracentesis and thoracentesis until he underwent a VATS with pleurX catheter placement on 1/11/2018. He had been draining 700-800cc of fluid every 2 days until about 3 weeks ago, where the drainage had decreased.  Had seen Dr. James in clinic in early feb and it was decided to remove the catheter. Since the removal, he'd reported not feeling well with low grade temps ~100.2.  He was admitted with a suspicion for bacterial infection the workup for which was negative.  Discharged 2/16/18, seen in clinic 2/20/18 with Dr Ferreira.    Mr. Crocker presents with approximately 3 weeks of worsening diarrhea and 2-3 days of sinus pressure, drainage, and worsened cough.  He notes that he had a coughing fit last night and passed out, coughed throughout most of the night.  This also happened on 2/25/18.  He last saw Dr Ferreira in clinic on 2/20/18 where he was taken off myfortic and switched to cellcept (he hadn't been on cellcept because of leukopenia when they tried post-transplant).  Though his diarrhea had improved after his last discharge, he  states it has increased now to 15x/day, clear/yellow/green, no fevers, no exacerbating foods per say.  He was taken back off the cellcept and placed back on myfortic this past week and has not noticed immediate change in diarrhea. He also reports his baseline coughing fits havent improved and are minimally responsive to tessalon pearls.  Came on last night, he lost consciousness during a fit once which is relatively normal for him, and had two more during HPI.  He notes no sick contacts but does state he's had some URI symptoms as above.  His baseline vitals are usually -120 and BP 90s/60s-110s/70s.  He reports a history of intermittent diarrhea - did have Cdiff many years ago but this smells different.  No abdominal pain, no nausea, no vomiting.  No blood in diarrhea.  Appears last c-scope in 2015 with no evidence of infection or inflammatory processes.  He does report IBS which is different that this.    Last EMB 12/2017: - for AMR/CMR  Last Allomap 11/2017: 31    CMV status:   Donor: -  Recipient: +    Current immunosuppression:   - Pred 2.5 mg daily, myfortic 720mg po q12, prograf 3mg/3mg     TTE 12/12/17:    1 - Normal left ventricular systolic function (EF 60-65%).     2 - Normal left ventricular diastolic function.     3 - Normal right ventricular systolic function .     4 - Pulmonary hypertension. The estimated PA systolic pressure is 41 mmHg.     5 - Trivial to mild mitral regurgitation.     6 - Trivial tricuspid regurgitation.     7 - Intermediate central venous pressure.     8 - No wall motion abnormalities.      DSE 11/30/17:    1 - Post-cardiac transplantation study.     2 - Normal left ventricular systolic function (EF 55-60%).     3 - No wall motion abnormalities.     4 - Normal left ventricular diastolic function.     5 - Low normal to mildly depressed right ventricular systolic function .     6 - The estimated PA systolic pressure is 35 mmHg.     7 - Trivial mitral regurgitation.     8 - Mild  tricuspid regurgitation.     9 - Intermediate central venous pressure.   -No evidence of stress induced myocardial ischemia.     Past Medical History:   Diagnosis Date    Arthritis     Ascites         Cardiomyopathy     Depression                       Hashimoto's disease              History of coronary artery disease                  HLD (hyperlipidemia) 6/11/2015     type 1 diabetes mellitus              Pleural effusion due to another disorder          12/12/2017 6/30/2015    Type I diabetes mellitus, well controlled          05/29/2014           Past Surgical History:   Procedure Laterality Date                CHOLECYSTECTOMY      CORONARY ANGIOPLASTY      FOOT SPLIT TRANSFER OF THE POSTERIOR TIBIALIS TENDON PROCEDURE      HEART TRANSPLANT  2014    KNEE SURGERY      PleuRx catheter placement  01/11/2018 November 2014    stent placemnet         Review of patient's allergies indicates:   Allergen Reactions    No known drug allergies        No current facility-administered medications on file prior to encounter.      Current Outpatient Prescriptions on File Prior to Encounter   Medication Sig    aspirin (ECOTRIN) 81 MG EC tablet Take 1 tablet (81 mg total) by mouth once daily.    escitalopram oxalate (LEXAPRO) 20 MG tablet Take 1 tablet (20 mg total) by mouth once daily.    gabapentin (NEURONTIN) 300 MG capsule Take 3 capsules (900 mg total) by mouth 3 (three) times daily.    insulin aspart (NOVOLOG) 100 unit/mL InPn pen Inject 4 Units into the skin 3 (three) times daily.    insulin detemir (LEVEMIR FLEXTOUCH) 100 unit/mL (3 mL) SubQ InPn pen Inject 15 Units into the skin every evening. (Patient taking differently: Inject 15 Units into the skin 2 (two) times daily. )    lancets Misc 1 Device by Misc.(Non-Drug; Combo Route) route 4 (four) times daily with meals and nightly.    levothyroxine (SYNTHROID) 150 MCG tablet Take 1 tablet (150 mcg total) by mouth every morning.  "   magnesium oxide (MAG-OX) 400 mg tablet Take 1 tablet (400 mg total) by mouth once daily.    mycophenolate (MYFORTIC) 360 MG TbEC Take 2 tablets (720 mg total) by mouth 2 (two) times daily.    nortriptyline (PAMELOR) 25 MG capsule Take 1 capsule (25 mg total) by mouth every evening.    ondansetron (ZOFRAN-ODT) 4 MG TbDL Take 2 tablets (8 mg total) by mouth every 8 (eight) hours as needed (nausea).    pantoprazole (PROTONIX) 40 MG tablet TAKE ONE TABLET BY MOUTH EVERY DAY    pravastatin (PRAVACHOL) 40 MG tablet Take 1 tablet (40 mg total) by mouth every evening. (Patient taking differently: Take 40 mg by mouth once daily. )    predniSONE (DELTASONE) 2.5 MG tablet Take 1 tablet (2.5 mg total) by mouth once daily. S/P Heart Transplant 11/18/2014 ICD-10 Z94.1    tacrolimus (PROGRAF) 1 MG Cap Taked 3mg orally in the morning and 3mg orally in the evening. S/p heart transplant  11/18/2014  ICD-10 Z94.1    tamsulosin (FLOMAX) 0.4 mg Cp24 TAKE 2 CAPSULES(0.8 MG) BY MOUTH EVERY EVENING    torsemide (DEMADEX) 20 MG Tab Take 2 tablets (40 mg total) by mouth once daily.    pen needle, diabetic 32 gauge x 5/32" Ndle Uses 5 pen needles a day       Social History Main Topics    Smoking status: Never Smoker    Smokeless tobacco: Never Used    Alcohol use No    Drug use: No    Sexual activity: Yes     Partners: Female     Review of Systems   Constitution: Positive for weakness, malaise/fatigue and weight loss. Negative for fever.   HENT: Positive for congestion.    Eyes: Negative.    Cardiovascular: Positive for near-syncope and syncope. Negative for chest pain, irregular heartbeat, leg swelling, orthopnea and palpitations.   Respiratory: Positive for cough.    Endocrine: Negative.    Hematologic/Lymphatic: Negative.    Skin: Negative.    Musculoskeletal: Negative.    Gastrointestinal: Positive for diarrhea. Negative for abdominal pain.   Genitourinary: Negative.    Psychiatric/Behavioral: Negative.      Objective: "     Vital Signs (Most Recent):  Temp: 99.4 °F (37.4 °C) (03/11/18 2125)  Pulse: 110 (03/11/18 2212)  Resp: (!) 23 (03/11/18 2212)  BP: (!) 101/53 (03/11/18 2212)  SpO2: 95 % (03/11/18 2212) Vital Signs (24h Range):  Temp:  [99.1 °F (37.3 °C)-99.4 °F (37.4 °C)] 99.4 °F (37.4 °C)  Pulse:  [108-127] 110  Resp:  [13-23] 23  SpO2:  [94 %-96 %] 95 %  BP: ()/(49-56) 101/53     Weight: 82.1 kg (181 lb) Recorded by nursing staff - unknown how.  Body mass index is 28.35 kg/m².    SpO2: 95 %  O2 Device (Oxygen Therapy): room air    Physical Exam   Constitutional: He is oriented to person, place, and time. He appears well-developed and well-nourished. No distress.   HENT:   Head: Normocephalic and atraumatic.   Mouth/Throat: No oropharyngeal exudate.   Eyes: EOM are normal. No scleral icterus.   Neck: JVD (to mid neck sitting up, however prominent V-waves difficult to discern baseline) present.   Cardiovascular: Regular rhythm.  Exam reveals no gallop and no friction rub.    Murmur heard.  Tachycardic   Pulmonary/Chest: Effort normal. No respiratory distress. He has no wheezes. He has no rales. He exhibits no tenderness.   Decreased breath sounds right lung base   Abdominal: Soft. He exhibits no distension. There is no tenderness. There is no rebound.   Musculoskeletal: Normal range of motion. He exhibits no edema.   Neurological: He is alert and oriented to person, place, and time.   Skin: Skin is warm and dry. He is not diaphoretic. No erythema.   Psychiatric: He has a normal mood and affect.     Significant Labs:   CMP   Recent Labs  Lab 03/11/18 1922     136   K 4.6  4.6     101   CO2 21*  21*   *  166*   BUN 35*  35*   CREATININE 3.4*  3.4*   CALCIUM 9.8  9.8   PROT 6.9   ALBUMIN 3.2*   BILITOT 1.3*   ALKPHOS 317*   AST 18   ALT 9*   ANIONGAP 14  14   ESTGFRAFRICA 24.0*  24.0*   EGFRNONAA 20.8*  20.8*    and CBC   Recent Labs  Lab 03/11/18 1922   WBC 3.30*   HGB 8.2*   HCT 25.1*   PLT  218     Assessment and Plan:     * Diarrhea    Differential includes infectious: CMV vs Norvirus/Rotavirus/Giardia/Cdiff, vs. Medication related vs. Thyroid related  -Stool studies pending  -TSH  -If above work up negative and persistent then will discuss with GI.        Cough, persistent    mucinex dm  Sinus xray  Chronic right pleural effusion unchanged          Type 1 diabetes mellitus with stage 3 chronic kidney disease    Continue 1/2 home insulin        Chronic kidney disease (CKD), stage III (moderate)    Discuss with staff in AM if any change to immunosuppression regimen indicated.        Heart transplanted    No evidence of graft dysfunction, no evidence of heart failure.  Possibility that symptoms could represent rejection however will work up infectious etiology of diarrhea first.  -Admit to HTS  -Continue tacro 3/3, Pred 2.5, Myfortic 720  -DSA ordered in ED  -F.u. TTE confirm no change in LV function          Jules Blancas MD  Cardiology   [facility]

## 2018-03-12 NOTE — PROGRESS NOTES
Pt sent down to ultrasound via transport in stretcher.  IV fluids sent with pt.  TELE monitoring in progress.  No issues noted.  Will monitor for pts return to the unit.

## 2018-03-12 NOTE — SUBJECTIVE & OBJECTIVE
Interval History: Has had 2 watery stools since admit. No improvement in the diarrhea since transitioning back to Myfortic on Thursday. No N/V. Does has occasional RLQ abdominal pain. Poor appetite. Coughing spells continue, sometimes productive of clear sputum - reports last cough syncope was Saturday, but admit H&P notes that he had 2 spells during HPI.    Continuous Infusions:   sodium chloride 0.9% 75 mL/hr at 03/12/18 1016     Scheduled Meds:   aspirin  81 mg Oral Daily    enoxaparin  30 mg Subcutaneous Daily    escitalopram oxalate  20 mg Oral Daily    gabapentin  900 mg Oral TID    insulin aspart U-100  2 Units Subcutaneous TIDWM    insulin detemir U-100  10 Units Subcutaneous QHS    levothyroxine  150 mcg Oral QAM    mycophenolate  720 mg Oral BID    nortriptyline  25 mg Oral QHS    pantoprazole  40 mg Oral Daily    pravastatin  40 mg Oral Daily    predniSONE  2.5 mg Oral Daily    sodium chloride 0.9%  3 mL Intravenous Q8H    tacrolimus  3 mg Oral BID    tamsulosin  0.4 mg Oral QHS     PRN Meds:benzonatate, ondansetron, promethazine-codeine 6.25-10 mg/5 ml    Review of patient's allergies indicates:   Allergen Reactions    No known drug allergies      Objective:     Vital Signs (Most Recent):  Temp: 99.4 °F (37.4 °C) (03/12/18 0730)  Pulse: (!) 111 (03/12/18 1100)  Resp: 18 (03/12/18 0730)  BP: (!) 91/45 (03/12/18 0730)  SpO2: (!) 93 % (03/12/18 0730) Vital Signs (24h Range):  Temp:  [99.1 °F (37.3 °C)-99.4 °F (37.4 °C)] 99.4 °F (37.4 °C)  Pulse:  [108-127] 111  Resp:  [13-23] 18  SpO2:  [93 %-96 %] 93 %  BP: ()/(45-56) 91/45     Patient Vitals for the past 72 hrs (Last 3 readings):   Weight   03/12/18 0100 78.9 kg (174 lb)   03/11/18 1818 82.1 kg (181 lb)     Body mass index is 27.25 kg/m².      Intake/Output Summary (Last 24 hours) at 03/12/18 1144  Last data filed at 03/12/18 0730   Gross per 24 hour   Intake             1280 ml   Output                0 ml   Net             1280 ml        Hemodynamic Parameters:       Telemetry: ST    Physical Exam   Constitutional: He appears well-developed.   Appears ill   HENT:   Head: Normocephalic and atraumatic.   Eyes: Conjunctivae and EOM are normal. Pupils are equal, round, and reactive to light.   Neck: Normal range of motion. No JVD present. No thyromegaly present.   Cardiovascular: Regular rhythm.    Tachycardic   Pulmonary/Chest: Effort normal.   Breath sounds are slightly decreased right base. Essentially CTA bilaterally   Abdominal: Soft. Bowel sounds are normal.   ? ascites   Musculoskeletal: Normal range of motion. He exhibits no edema.   Neurological: He is alert.   Skin: Skin is warm and dry. Capillary refill takes less than 2 seconds. There is pallor.   Psychiatric: He has a normal mood and affect. His behavior is normal. Judgment and thought content normal.       Significant Labs:  CBC:    Recent Labs  Lab 03/06/18 0915 03/11/18 1922 03/12/18  0443   WBC 5.21 3.30* 3.05*   RBC 3.01* 2.78* 2.62*   HGB 8.9* 8.2* 7.7*   HCT 27.9* 25.1* 24.1*    218 187   MCV 93 90 92   MCH 29.6 29.5 29.4   MCHC 31.9* 32.7 32.0     BNP:    Recent Labs  Lab 03/11/18 1922   *     CMP:    Recent Labs  Lab 03/11/18 1922 03/12/18  0443   *  166* 121*   CALCIUM 9.8  9.8 9.3   ALBUMIN 3.2* 2.9*   PROT 6.9 6.2     136 136   K 4.6  4.6 4.9   CO2 21*  21* 20*     101 103   BUN 35*  35* 35*   CREATININE 3.4*  3.4* 3.1*   ALKPHOS 317* 298*   ALT 9* 8*   AST 18 17   BILITOT 1.3* 1.2*      Coagulation:   No results for input(s): PT, INR, APTT in the last 168 hours.  LDH:  No results for input(s): LDH in the last 72 hours.  Microbiology:  Microbiology Results (last 7 days)     Procedure Component Value Units Date/Time    Stool culture **CANNOT BE ORDERED STAT** [[id number]] Collected:  03/11/18 1923    Order Status:  Completed Specimen:  Stool from Stool Updated:  03/12/18 1037     Stool Culture No growth    Clostridium difficile  EIA [[id number]] Collected:  03/11/18 1923    Order Status:  Completed Specimen:  Stool from Stool Updated:  03/11/18 8950     C. diff Antigen Negative     C difficile Toxins A+B, EIA Negative     Comment: Testing not recommended for children <24 months old.       Stool culture [[id number]]     Order Status:  Completed Specimen:  Stool from Stool     Stool culture [[id number]]     Order Status:  Canceled Specimen:  Stool from Stool     E. coli 0157 antigen [[id number]] Collected:  03/11/18 1923    Order Status:  No result Specimen:  Stool from Stool Updated:  03/11/18 2001          I have reviewed all pertinent labs within the past 24 hours.    Estimated Creatinine Clearance: 28.4 mL/min (A) (based on SCr of 3.1 mg/dL (H)).    Diagnostic Results:  I have reviewed all pertinent imaging results/findings within the past 24 hours.

## 2018-03-12 NOTE — CONSULTS
[facility]  Endocrinology  Diabetes Consult Note    Consult Requested by: Behzad Lara Jr.,*   Reason for admit: Diarrhea    HISTORY OF PRESENT ILLNESS:  Reason for Consult: abnormal TFTs    Surgical Procedure and Date: s/p heart transplant 2014     Diabetes diagnosis year: 5yo (T1DM)     Home Diabetes Medications:    Levemir 15u qHS  Novolog 4u AC     How often checking glucose at home? 4 times daily   BG readings on regimen: 150-200s  Hypoglycemia on the regimen?  Yes, rarely - treats appropriately (light snack, glucose tab)  Missed doses on regimen?  No        Diabetes Complications include:     Hyperglycemia, Hypoglycemia  and Diabetic chronic kidney disease          Complicating diabetes co morbidities:   N/A     HPI:   Patient is a 42 y.o. male with a diagnosis of T1DM, HTN, hypothyroidism, s/p heart transplant.  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 He required serial paracentesis and thoracentesis until he underwent a VATS with pleurX catheter placement on 1/11/2018 and removed 2/7/18.       Presented to hospital secondary to diarrhea and cough.  Upon initial labs, TSH was decreased (0.101) and free T4 1.33.  Endocrinology consulted to assist in management of these labs.  He was last seen in clinic by Kamala Vázquez NP 11/2017.   Previous hospitalization, endocrine consulted for same problem.  During that visit, recommended decreasing LT4 to 125mcg daily. Unfortunately, patient was discharged on previous dose of 150mcg daily.     Interval HPI:   Overnight events:  AFVSS.    Patient's wife confirmed dose of LT4 of 150mcg at home.   Patient has no complaints.  Primary team investigating cough and diarrhea.    Received Levemir 10u once.  No scheduled insulin.    PMH, PSH, FH, SH updated and reviewed     ROS:  Review of Systems   Constitutional: Negative for unexpected weight  change.   Eyes: Negative for visual disturbance.   Respiratory: Negative for shortness of breath.    Cardiovascular: Negative for chest pain.   Gastrointestinal: Positive for diarrhea. Negative for abdominal pain, blood in stool, nausea and vomiting.   Genitourinary: Negative for urgency.   Musculoskeletal: Negative for arthralgias.   Skin: Negative for wound.   Neurological: Negative for headaches.   Hematological: Does not bruise/bleed easily.   Psychiatric/Behavioral: Negative for sleep disturbance.       Current Medications and/or Treatments Impacting Glycemic Control  Immunotherapy:    Immunosuppressants         Stop Route Frequency     tacrolimus capsule 3 mg      -- Oral 2 times daily     mycophenolate EC tablet 720 mg      -- Oral 2 times daily        Steroids:   Hormones     Start     Stop Route Frequency Ordered    03/12/18 0900  predniSONE tablet 2.5 mg      -- Oral Daily 03/12/18 0023        Pressors:    Autonomic Drugs     None        Hyperglycemia/Diabetes Medications:   Antihyperglycemics     Start     Stop Route Frequency Ordered    03/12/18 1318  insulin aspart U-100 pen 0-5 Units      -- SubQ Before meals & nightly PRN 03/12/18 1218             PHYSICAL EXAMINATION:  Vitals:    03/12/18 1548   BP:    Pulse: 105   Resp:    Temp:      Body mass index is 27.25 kg/m².    Physical Exam   Constitutional: He appears well-developed.   HENT:   Right Ear: External ear normal.   Left Ear: External ear normal.   Nose: Nose normal.   Hearing normal  Dentition good   Neck: No tracheal deviation present. No thyromegaly present.   Cardiovascular: Normal rate.    No murmur heard.  Pulmonary/Chest: Effort normal and breath sounds normal.   Abdominal: Soft. There is no tenderness. No hernia.   Musculoskeletal: He exhibits no edema.   Neurological: He has normal reflexes. No cranial nerve deficit.   Skin: No rash noted.   No nodules   Psychiatric: He has a normal mood and affect. Judgment normal.   Vitals  reviewed.        Labs Reviewed and Include     Recent Labs  Lab 03/12/18  0443   *   CALCIUM 9.3   ALBUMIN 2.9*   PROT 6.2      K 4.9   CO2 20*      BUN 35*   CREATININE 3.1*   ALKPHOS 298*   ALT 8*   AST 17   BILITOT 1.2*     Lab Results   Component Value Date    WBC 3.05 (L) 03/12/2018    HGB 7.7 (L) 03/12/2018    HCT 24.1 (L) 03/12/2018    MCV 92 03/12/2018     03/12/2018       Recent Labs  Lab 03/12/18  0443   TSH 0.101*   FREET4 1.33     Lab Results   Component Value Date    HGBA1C 6.9 (H) 02/11/2018       Nutritional status:   Body mass index is 27.25 kg/m².  Lab Results   Component Value Date    ALBUMIN 2.9 (L) 03/12/2018    ALBUMIN 3.2 (L) 03/11/2018    ALBUMIN 2.6 (L) 02/20/2018     Lab Results   Component Value Date    PREALBUMIN 18 (L) 03/24/2015    PREALBUMIN 19 (L) 12/17/2014    PREALBUMIN 24 12/05/2014       Estimated Creatinine Clearance: 28.4 mL/min (A) (based on SCr of 3.1 mg/dL (H)).    Accu-Checks  Recent Labs      03/12/18   0103  03/12/18   0735  03/12/18   0836   POCTGLUCOSE  171*  63*  76        ASSESSMENT and PLAN    Type 1 diabetes mellitus with stage 3 chronic kidney disease      BG goal 140-180  Lab Results   Component Value Date    HGBA1C 6.9 (H) 02/11/2018     Patient on Novolog 4u AC and Levemir 15u qHS  Patient on CLD, and received Levemir 10u once with subsequent hypoglycemia.    Will decrease to Levemir 5u BID, and titrate as necessary.  No scheduled prandial insulin at this time, secondary to clear liquid diet order and NPO status tonight.  POC AC/HS/0200    Titrate as necessary.    Discharge Recommendations:  TBD.        Hypothyroidism due to Hashimoto's thyroiditis      Abnormal TFTs upon admit.  Unclear if secondary to illness, but has been seen before in the past (during previous hospitalization).    Recommend decreasing dose of LT4 to 125mcg daily.  Repeat TFTs in 4 weeks.          Heart transplanted    Per transplant  Avoid hypoglycemia           Current use of steroid medication    On PDN daily.  Can cause prandial excursions.            Plan discussed with patient, family, and RN at bedside.     Deonna Larry MD  Endocrinology  [facility]

## 2018-03-12 NOTE — HPI
This is a 41 yo M with hashimoto's thyroidits, DM1 on insulin, s/p heart transplant 2014 admitted for ongoing diarrhea for past 3-4 weeks and cough, sinus congestion. He was admitted from 2/11 to 2/14 and was reporting diarrhea that started since around then. Diarrhea is watery, nonbloody, and occurs >10 times a day. He is having nocturnal symptoms as well. Reports some right lower quadrant abdominal cramping. Denies any fevers or chills. He denies any recent travel or sick contacts. He was started on cellcept 2/20 but his diarrhea started before then. Since being here, he is off the cellcept but has not noticed significant improvement in his diarrhea.

## 2018-03-12 NOTE — HPI
43 yo gentleman with hashimoto's thyroidits, DM1 on insulin, s/p OHTX 11/14 complicated by AMR, treated 04/15 with 5 days PP, IVIG x 2 doses, was scheduled for Rituximab on 5/1/15 but developed pancytopenia in April 2015 after which he got CMV and c. Dif at the same time.  Angiogram 2/16 with IVUS showed no evidence of CAV. 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 He was getting monthly paracentesis and thoracentesis until he underwent a VATS with pleurX catheter placement on 1/11/2018. He had been draining 700-800cc of fluid every 2 days until about 3 weeks ago, where the drainage had decreased.  Had seen Dr. James in clinic in early feb and it was decided to remove the catheter. Since the removal, he'd reported not feeling well with low grade temps ~100.2.  He was admitted with a suspicion for bacterial infection the workup for which was negative.  Discharged 2/16/18, seen in clinic 2/20/18 with Dr Ferreira.    Mr. Crocker presents with approximately 3 weeks of worsening diarrhea and 2-3 days of sinus pressure, drainage, and worsened cough.  He notes that he had a coughing fit last night and passed out, coughed throughout most of the night.  This also happened on 2/25/18.  He last saw Dr Ferreira in clinic on 2/20/18 where he was taken off myfortic and switched to cellcept (he hadn't been on cellcept because of leukopenia when they tried post-transplant).  Though his diarrhea had improved after his last discharge, he states it has increased now to 15x/day, clear/yellow/green, no fevers, no exacerbating foods per say.  He was taken back off the cellcept and placed back on myfortic this past week and has not noticed immediate change in diarrhea. He also reports his baseline coughing fits havent improved and are minimally responsive to tessalon pearls.  Came on last night, he lost consciousness during a fit once  which is relatively normal for him, and had two more during HPI.  He notes no sick contacts but does state he's had some URI symptoms as above.  His baseline vitals are usually -120 and BP 90s/60s-110s/70s.  He reports a history of intermittent diarrhea - did have Cdiff many years ago but this smells different.  No abdominal pain, no nausea, no vomiting.  No blood in diarrhea.  Appears last c-scope in 2015 with no evidence of infection or inflammatory processes.  He does report IBS which is different that this.    Last EMB 12/2017: - for AMR/CMR  Last Allomap 11/2017: 31    CMV status:   Donor: -  Recipient: +    Current immunosuppression:   - Pred 2.5 mg daily, myfortic 720mg po q12, prograf 3mg/3mg     TTE 12/12/17:    1 - Normal left ventricular systolic function (EF 60-65%).     2 - Normal left ventricular diastolic function.     3 - Normal right ventricular systolic function .     4 - Pulmonary hypertension. The estimated PA systolic pressure is 41 mmHg.     5 - Trivial to mild mitral regurgitation.     6 - Trivial tricuspid regurgitation.     7 - Intermediate central venous pressure.     8 - No wall motion abnormalities.      DSE 11/30/17:    1 - Post-cardiac transplantation study.     2 - Normal left ventricular systolic function (EF 55-60%).     3 - No wall motion abnormalities.     4 - Normal left ventricular diastolic function.     5 - Low normal to mildly depressed right ventricular systolic function .     6 - The estimated PA systolic pressure is 35 mmHg.     7 - Trivial mitral regurgitation.     8 - Mild tricuspid regurgitation.     9 - Intermediate central venous pressure.   -No evidence of stress induced myocardial ischemia.

## 2018-03-12 NOTE — CONSULTS
[facility]  Cardiology  Consult Note    Patient Name: Lv Crocker  MRN: [id number]  Admission Date: 3/11/2018  Hospital Length of Stay: 0 days  Code Status: Prior   Attending Provider: Abeba Valenzuela MD   Consulting Provider: Lc Webber MD  Primary Care Physician: Philippe Mohr MD  Principal Problem:<principal problem not specified>    Patient information was obtained from patient, spouse/SO and past medical records.     Inpatient consult to Cardiology  Consult performed by: LC WEBBER  Consult ordered by: ABEBA VALENZUELA        Subjective:     Chief Complaint:  Cough, diarrhea     HPI:   41 yo WM PMH sig for Hashimoto's thyroidits, DM1 on insulin, s/p OHTX 11/14 complicated by AMR, treated 04/15 with 5 days PP, IVIG x 2 doses, was scheduled for Rituximab on 5/1/15 but developed pancytopenia in April 2015 after which he got CMV and c. Dif at the same time.  Angiogram 2/16 with IVUS showed no evidence of CAV. 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 He was getting monthly paracentesis and thoracentesis until he underwent a VATS with pleurX catheter placement on 1/11/2018. He had been draining 700-800cc of fluid every 2 days until about 3 weeks ago, where the drainage had decreased.  Had seen Dr. James in clinic in early feb and it was decided to remove the catheter. Since the removal, he'd reported not feeling well with low grade temps ~100.2.  He was admitted with a suspicion for bacterial infection the workup for which was negative.  Discharged 2/16/18, seen in clinic 2/20/18 with Dr Ferreira.    He is presenting with approximately 3 weeks of worsening diarrhea and 2-3 days of sinus pressure, drainage, and worsened cough.  He notes that he had a coughing fit last night and passed out, coughed throughout most of the night.  This also happened on 2/25/18.  He  last saw Dr Ferreira in clinic on 2/20/18 where he was taken off myfortic and switched to cellcept (he hadn't been on cellcept because of leukopenia when they tried post-transplant).  Though his diarrhea had improved after his last discharge, he states it has increased now to 15x/day, clear/yellow/green, no fevers, no exacerbating foods per say.  He also reports his baseline coughing fits havent improved and are minimally responsive to tessalon pearls.  Came on last night, he lost consciousness during a fit once which is relatively normal for him, and had two more during HPI.  He notes no sick contacts but does state he's had some URI symptoms as above.  His baseline vitals are usually -120 and BP 90s/60s-110s/70s.    Last EMB 12/2017: - for AMR/CMR  Last Allomap 11/2017: 31    CMV status:   Donor: -  Recipient: +    Current immunosuppression:   - Pred 2.5 mg daily, myfortic 720mg po q12, prograf 3mg/3mg     TTE 12/12/17:    1 - Normal left ventricular systolic function (EF 60-65%).     2 - Normal left ventricular diastolic function.     3 - Normal right ventricular systolic function .     4 - Pulmonary hypertension. The estimated PA systolic pressure is 41 mmHg.     5 - Trivial to mild mitral regurgitation.     6 - Trivial tricuspid regurgitation.     7 - Intermediate central venous pressure.     8 - No wall motion abnormalities.      DSE 11/30/17:    1 - Post-cardiac transplantation study.     2 - Normal left ventricular systolic function (EF 55-60%).     3 - No wall motion abnormalities.     4 - Normal left ventricular diastolic function.     5 - Low normal to mildly depressed right ventricular systolic function .     6 - The estimated PA systolic pressure is 35 mmHg.     7 - Trivial mitral regurgitation.     8 - Mild tricuspid regurgitation.     9 - Intermediate central venous pressure.   -No evidence of stress induced myocardial ischemia.     Past Medical History:   Diagnosis Date    Arthritis     Ascites   "   Thought to be 2/2 heart tpx; liver bx 3/31/17 w/o significant fibrosis    Cardiomyopathy     Depression     Controlled "for the most part" on Lexipro    Encounter for blood transfusion     during transplant    GERD (gastroesophageal reflux disease)     Hashimoto's disease     History of CHF (congestive heart failure)     Previously EF 20% (prior to heart transplant); last EF 60%, PAP 41 as of 12/12/17; throught to be 2/2 genetics & DM1    History of coronary artery disease     H/o Coronary artery disease; resolved since heart transplant 2014    History of myocardial infarction     h/o MI x3 prior to heart transplant    HLD (hyperlipidemia) 6/11/2015    Hypoglycemia unawareness in type 1 diabetes mellitus     Hypothyroid     Initially hyperthyroid 2/2 Hoshimoto's thyroiditis; now on levothyroxine 150 mcg qd    Pleural effusion due to another disorder     Thought to be 2/2 heart tpx; s/p PleuRx catheter placement and removal after 1-2 months    Pulmonary hypertension assoc with unclear multi-factorial mechanisms 12/12/2017    PAP 41 (EF 60%) on ECHO 12/12/17    Syncope 6/30/2015    Type I diabetes mellitus, well controlled     Well controlled; Dx'd @7y/o; on N insulin 20U BID & Humalog 10U w/meals +SSI; Glu 89 11/9/17; A1c 7.2% 4/5/17    Unspecified essential hypertension 05/29/2014    Well controlled; Last /57 on 11/9/17       Past Surgical History:   Procedure Laterality Date    CARDIAC CATHETERIZATION      CARDIAC SURGERY      CHOLECYSTECTOMY      CORONARY ANGIOPLASTY      FOOT SPLIT TRANSFER OF THE POSTERIOR TIBIALIS TENDON PROCEDURE      HEART TRANSPLANT  2014    KNEE SURGERY      PleuRx catheter placement  01/11/2018    Plan for removal after 1-2 months by Dr. James in cardiothoracic surgery    s/p oht  November 2014    stent placemnet         Review of patient's allergies indicates:   Allergen Reactions    No known drug allergies        No current facility-administered " medications on file prior to encounter.      Current Outpatient Prescriptions on File Prior to Encounter   Medication Sig    aspirin (ECOTRIN) 81 MG EC tablet Take 1 tablet (81 mg total) by mouth once daily.    escitalopram oxalate (LEXAPRO) 20 MG tablet Take 1 tablet (20 mg total) by mouth once daily.    gabapentin (NEURONTIN) 300 MG capsule Take 3 capsules (900 mg total) by mouth 3 (three) times daily.    insulin aspart (NOVOLOG) 100 unit/mL InPn pen Inject 4 Units into the skin 3 (three) times daily.    insulin detemir (LEVEMIR FLEXTOUCH) 100 unit/mL (3 mL) SubQ InPn pen Inject 15 Units into the skin every evening. (Patient taking differently: Inject 15 Units into the skin 2 (two) times daily. )    lancets Misc 1 Device by Misc.(Non-Drug; Combo Route) route 4 (four) times daily with meals and nightly.    levothyroxine (SYNTHROID) 150 MCG tablet Take 1 tablet (150 mcg total) by mouth every morning.    magnesium oxide (MAG-OX) 400 mg tablet Take 1 tablet (400 mg total) by mouth once daily.    mycophenolate (MYFORTIC) 360 MG TbEC Take 2 tablets (720 mg total) by mouth 2 (two) times daily.    nortriptyline (PAMELOR) 25 MG capsule Take 1 capsule (25 mg total) by mouth every evening.    ondansetron (ZOFRAN-ODT) 4 MG TbDL Take 2 tablets (8 mg total) by mouth every 8 (eight) hours as needed (nausea).    pantoprazole (PROTONIX) 40 MG tablet TAKE ONE TABLET BY MOUTH EVERY DAY    pravastatin (PRAVACHOL) 40 MG tablet Take 1 tablet (40 mg total) by mouth every evening. (Patient taking differently: Take 40 mg by mouth once daily. )    predniSONE (DELTASONE) 2.5 MG tablet Take 1 tablet (2.5 mg total) by mouth once daily. S/P Heart Transplant 11/18/2014 ICD-10 Z94.1    tacrolimus (PROGRAF) 1 MG Cap Taked 3mg orally in the morning and 3mg orally in the evening. S/p heart transplant  11/18/2014  ICD-10 Z94.1    tamsulosin (FLOMAX) 0.4 mg Cp24 TAKE 2 CAPSULES(0.8 MG) BY MOUTH EVERY EVENING    torsemide (DEMADEX) 20  "MG Tab Take 2 tablets (40 mg total) by mouth once daily.    pen needle, diabetic 32 gauge x 5/32" Ndle Uses 5 pen needles a day     Family History     Problem Relation (Age of Onset)    Cancer Brother (50)    Diabetes Mother, Father, Brother, Son, Sister, Maternal Uncle, Paternal Aunt, Maternal Grandmother, Maternal Grandfather    Heart disease Mother, Brother    Hypertension Mother, Father, Brother    Kidney disease Mother, Father    Stroke Father, Brother    Vision loss Brother        Social History Main Topics    Smoking status: Never Smoker    Smokeless tobacco: Never Used    Alcohol use No    Drug use: No    Sexual activity: Yes     Partners: Female     Review of Systems   Constitution: Negative.   HENT: Positive for congestion.         Sinus pressure/drainage   Eyes: Negative.    Cardiovascular: Negative.    Respiratory: Positive for cough. Negative for sputum production.    Endocrine: Negative.    Skin: Negative.    Musculoskeletal: Negative.    Gastrointestinal: Positive for diarrhea.   Genitourinary: Negative.    Neurological: Negative.      Objective:     Vital Signs (Most Recent):  Temp: 99.3 °F (37.4 °C) (03/11/18 2013)  Pulse: (!) 112 (03/11/18 2013)  Resp: 18 (03/11/18 2013)  BP: (!) 100/56 (03/11/18 2013)  SpO2: 95 % (03/11/18 2013) Vital Signs (24h Range):  Temp:  [99.1 °F (37.3 °C)-99.3 °F (37.4 °C)] 99.3 °F (37.4 °C)  Pulse:  [112-127] 112  Resp:  [18-20] 18  SpO2:  [95 %] 95 %  BP: ()/(49-56) 100/56     Weight: 82.1 kg (181 lb)  Body mass index is 28.35 kg/m².    SpO2: 95 %  O2 Device (Oxygen Therapy): room air    No intake or output data in the 24 hours ending 03/11/18 2101    Lines/Drains/Airways     Peripheral Intravenous Line                 Peripheral IV - Single Lumen 03/11/18 1931 Left Hand less than 1 day                Physical Exam   Constitutional: He is oriented to person, place, and time. He appears well-developed and well-nourished.   HENT:   Head: Normocephalic and " atraumatic.   Eyes: Conjunctivae and EOM are normal. Pupils are equal, round, and reactive to light.   Neck: Normal range of motion. Neck supple. No JVD present.   Cardiovascular: Regular rhythm and normal heart sounds.  Tachycardia present.  Exam reveals no gallop and no friction rub.    No murmur heard.  No jvd  Peripherally warm, cap refill < 2s  No pitting edema   Pulmonary/Chest: Effort normal and breath sounds normal. No respiratory distress. He has no wheezes. He has no rales. He exhibits no tenderness.   Abdominal: Soft. Bowel sounds are normal. He exhibits no distension. There is no tenderness.   Musculoskeletal: Normal range of motion. He exhibits no edema or tenderness.   Neurological: He is alert and oriented to person, place, and time.   Skin: Skin is warm and dry. No erythema. No pallor.       Significant Labs:   ABG: No results for input(s): PH, PCO2, HCO3, POCSATURATED, BE in the last 48 hours., CMP   Recent Labs  Lab 03/11/18 1922     136   K 4.6  4.6     101   CO2 21*  21*   *  166*   BUN 35*  35*   CREATININE 3.4*  3.4*   CALCIUM 9.8  9.8   PROT 6.9   ALBUMIN 3.2*   BILITOT 1.3*   ALKPHOS 317*   AST 18   ALT 9*   ANIONGAP 14  14   ESTGFRAFRICA 24.0*  24.0*   EGFRNONAA 20.8*  20.8*   , CBC   Recent Labs  Lab 03/11/18 1922   WBC 3.30*   HGB 8.2*   HCT 25.1*      , INR No results for input(s): INR, PROTIME in the last 48 hours., Lipid Panel No results for input(s): CHOL, HDL, LDLCALC, TRIG, CHOLHDL in the last 48 hours. and Troponin   Recent Labs  Lab 03/11/18 1922   TROPONINI <0.006       Significant Imaging: Echocardiogram:   2D echo with color flow doppler:   Results for orders placed or performed during the hospital encounter of 12/11/17   2D echo with color flow doppler   Result Value Ref Range    EF 60 55 - 65    Mitral Valve Regurgitation TRIVIAL TO MILD     Diastolic Dysfunction No     Est. PA Systolic Pressure 40.95 (A)     Tricuspid Valve  Regurgitation TRIVIAL    , EKG: sinus tach, rvh, diffuse twi unchanged from prior and X-Ray: CXR: stable r pleural effusion    Assessment and Plan:     Cough, persistent    mucinex dm  Sinus xray  Chronic right pleural effusion unchanged          Diarrhea    Multiple stool studies ordered  Including norvirus pcr and rotavirus antigen  If all stool cultures negative consider immodium  Afebrile  Wbc normal  Creat elevated but possibly at new baseline -- iv hydration as noted above        Heart transplanted    Continue myfortic/prednisone/prograf as per home dose (hpi)  Echo ordered  No need for DSA per staff at this time  Patient is dry/euvolemic  Got 1L in ED, no need for more at this time  Admit to floor            Case discussed with staff and [facility] [place] floor fellow  VTE Risk Mitigation     None          Thank you for your consult.     Lc Muniz MD  Cardiology   [facility]

## 2018-03-12 NOTE — PROGRESS NOTES
NIKOS Enriquez reported pt's wife called pt relations over a week ago and has not heard back from them yet. SW called pt relations, spoke to Guerda, and requested she follow up with pt's wife. SW providing ongoing psychosocial and counseling support, education, assistance, resources, and discharge planning as indicated. SW continuing to follow and remains available.

## 2018-03-12 NOTE — ASSESSMENT & PLAN NOTE
No evidence of graft dysfunction, no evidence of heart failure.  Possibility that symptoms could represent rejection however will work up infectious etiology of diarrhea first.  -Admit to HTS  -Continue tacro 3/3, Pred 2.5, Myfortic 720  -DSA ordered in ED  -F.u. TTE confirm no change in LV function

## 2018-03-12 NOTE — NURSING
Rounds Report: Attended interdisciplinary rounds this morning with the transplant team including SW, physicians, fellows,  mid-level providers, and transplant coordinators.  Discussed plan of care, including DC date- possibly 1 week, current medications including current immunosuppression  and current plan to proceed with GI scope tomorrow.  I was unable to meet with the pt today and will follow up with any changes in pt's care and care plan.

## 2018-03-12 NOTE — PROGRESS NOTES
[facility]  Heart Transplant  Progress Note    Patient Name: Lv Crocker  MRN: [id number]  Admission Date: 3/11/2018  Hospital Length of Stay: 0 days  Attending Physician: Behzad Lara Jr.,*  Primary Care Provider: Philippe Mohr MD  Principal Problem:Diarrhea    Subjective:     Interval History: Has had 2 watery stools since admit. No improvement in the diarrhea since transitioning back to Myfortic on Thursday. No N/V. Does has occasional RLQ abdominal pain. Poor appetite. Coughing spells continue, sometimes productive of clear sputum - reports last cough syncope was Saturday, but admit H&P notes that he had 2 spells during HPI.    Continuous Infusions:   sodium chloride 0.9% 75 mL/hr at 03/12/18 1016     Scheduled Meds:   aspirin  81 mg Oral Daily    enoxaparin  30 mg Subcutaneous Daily    escitalopram oxalate  20 mg Oral Daily    gabapentin  900 mg Oral TID    insulin aspart U-100  2 Units Subcutaneous TIDWM    insulin detemir U-100  10 Units Subcutaneous QHS    levothyroxine  150 mcg Oral QAM    mycophenolate  720 mg Oral BID    nortriptyline  25 mg Oral QHS    pantoprazole  40 mg Oral Daily    pravastatin  40 mg Oral Daily    predniSONE  2.5 mg Oral Daily    sodium chloride 0.9%  3 mL Intravenous Q8H    tacrolimus  3 mg Oral BID    tamsulosin  0.4 mg Oral QHS     PRN Meds:benzonatate, ondansetron, promethazine-codeine 6.25-10 mg/5 ml    Review of patient's allergies indicates:   Allergen Reactions    No known drug allergies      Objective:     Vital Signs (Most Recent):  Temp: 99.4 °F (37.4 °C) (03/12/18 0730)  Pulse: (!) 111 (03/12/18 1100)  Resp: 18 (03/12/18 0730)  BP: (!) 91/45 (03/12/18 0730)  SpO2: (!) 93 % (03/12/18 0730) Vital Signs (24h Range):  Temp:  [99.1 °F (37.3 °C)-99.4 °F (37.4 °C)] 99.4 °F (37.4 °C)  Pulse:  [108-127] 111  Resp:  [13-23] 18  SpO2:  [93 %-96 %] 93 %  BP: ()/(45-56) 91/45     Patient Vitals for the past 72 hrs (Last 3  readings):   Weight   03/12/18 0100 78.9 kg (174 lb)   03/11/18 1818 82.1 kg (181 lb)     Body mass index is 27.25 kg/m².      Intake/Output Summary (Last 24 hours) at 03/12/18 1144  Last data filed at 03/12/18 0730   Gross per 24 hour   Intake             1280 ml   Output                0 ml   Net             1280 ml       Hemodynamic Parameters:       Telemetry: ST    Physical Exam   Constitutional: He appears well-developed.   Appears ill   HENT:   Head: Normocephalic and atraumatic.   Eyes: Conjunctivae and EOM are normal. Pupils are equal, round, and reactive to light.   Neck: Normal range of motion. No JVD present. No thyromegaly present.   Cardiovascular: Regular rhythm.    Tachycardic   Pulmonary/Chest: Effort normal.   Breath sounds are slightly decreased right base. Essentially CTA bilaterally   Abdominal: Soft. Bowel sounds are normal.   ? ascites   Musculoskeletal: Normal range of motion. He exhibits no edema.   Neurological: He is alert.   Skin: Skin is warm and dry. Capillary refill takes less than 2 seconds. There is pallor.   Psychiatric: He has a normal mood and affect. His behavior is normal. Judgment and thought content normal.       Significant Labs:  CBC:    Recent Labs  Lab 03/06/18  0915 03/11/18 1922 03/12/18  0443   WBC 5.21 3.30* 3.05*   RBC 3.01* 2.78* 2.62*   HGB 8.9* 8.2* 7.7*   HCT 27.9* 25.1* 24.1*    218 187   MCV 93 90 92   MCH 29.6 29.5 29.4   MCHC 31.9* 32.7 32.0     BNP:    Recent Labs  Lab 03/11/18 1922   *     CMP:    Recent Labs  Lab 03/11/18 1922 03/12/18  0443   *  166* 121*   CALCIUM 9.8  9.8 9.3   ALBUMIN 3.2* 2.9*   PROT 6.9 6.2     136 136   K 4.6  4.6 4.9   CO2 21*  21* 20*     101 103   BUN 35*  35* 35*   CREATININE 3.4*  3.4* 3.1*   ALKPHOS 317* 298*   ALT 9* 8*   AST 18 17   BILITOT 1.3* 1.2*      Coagulation:   No results for input(s): PT, INR, APTT in the last 168 hours.  LDH:  No results for input(s): LDH in the last 72  hours.  Microbiology:  Microbiology Results (last 7 days)     Procedure Component Value Units Date/Time    Stool culture **CANNOT BE ORDERED STAT** [[id number]] Collected:  03/11/18 1923    Order Status:  Completed Specimen:  Stool from Stool Updated:  03/12/18 1037     Stool Culture No growth    Clostridium difficile EIA [[id number]] Collected:  03/11/18 1923    Order Status:  Completed Specimen:  Stool from Stool Updated:  03/11/18 2355     C. diff Antigen Negative     C difficile Toxins A+B, EIA Negative     Comment: Testing not recommended for children <24 months old.       Stool culture [[id number]]     Order Status:  Completed Specimen:  Stool from Stool     Stool culture [[id number]]     Order Status:  Canceled Specimen:  Stool from Stool     E. coli 0157 antigen [[id number]] Collected:  03/11/18 1923    Order Status:  No result Specimen:  Stool from Stool Updated:  03/11/18 2001          I have reviewed all pertinent labs within the past 24 hours.    Estimated Creatinine Clearance: 28.4 mL/min (A) (based on SCr of 3.1 mg/dL (H)).    Diagnostic Results:  I have reviewed all pertinent imaging results/findings within the past 24 hours.    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 Most recently has had ongoing issues with his lungs, had gotten 2 thoracenteses, after which he underwent VATS 01/19/18 followed by pleurex catheter placement and effusion drainage 01/11/18, subsequently had it removed 02/07/18 after drainage had decreased despite multiple attempts to drain it. Has been having significant coughing fits that result in him being near-syncopal at times, although difficult to say if he has passed out or not- patient most recently was admitted to the hospital for increased AGUILAR, coughing and pre-syncope  02/11-02/14/18 at [facility].   Patient was started on antibiotics for suspected bacterial infection, with some diarrhea, bronchitis, and possible pneumonia. Ct chest showed some pleural fluid collection and after talking with Dr. James, we arranged for him to receive a diagnostic tap with IR, from which the fluid collection demonstrated no growth or significant findings at all really. Cell counts do not appear to have been sent but will recheck. Patient states since his hospital stay, yesterday had a significant coughing fit again, after which he nearly passed out, is being seen in clinic today for this. Denies any cardiopulmonary complaints, no leg swelling, has some abdominal swelling, which is moderate for him. Denies significant shortness of breath with mild exertion, had 6MWT yesterday to see if he qualified for home O2, and his O2 sats actually improved after recovery from where he started initially. He and his wife admit he is in bed most days, not very active.     Mr. Crocker presented to the ED 3/11/18 with approximately 3 weeks of worsening diarrhea and 2-3 days of sinus pressure, drainage, and worsened cough.  He notes that he had a coughing fit last night and passed out, coughed throughout most of the night.  This also happened on 2/25/18.  He last saw Dr Ferreira in clinic on 2/20/18 where he was taken off myfortic and switched to cellcept (he hadn't been on cellcept because of leukopenia when they tried post-transplant).  Though his diarrhea had improved after his last discharge, he states it has increased now to 15x/day, clear/yellow/green, no fevers, no exacerbating foods per say.  He was taken back off the cellcept and placed back on myfortic this past week and has not noticed immediate change in diarrhea. He also reports his baseline coughing fits havent improved and are minimally responsive to tessalon pearls.  Came on last night, he lost consciousness during a fit once which is relatively normal for  him, and had two more during HPI.  He notes no sick contacts but does state he's had some URI symptoms as above.  His baseline vitals are usually -120 and BP 90s/60s-110s/70s.  He reports a history of intermittent diarrhea - did have Cdiff many years ago but this smells different.  No abdominal pain, no nausea, no vomiting.  No blood in diarrhea.  Appears last c-scope in 2015 with no evidence of infection or inflammatory processes.  He does report IBS which is different that this.       * Diarrhea    - No improvement in watery stools despite changing Cellcept back to Myfortic on Thursday  - C. Diff -ve. Other stool studies pending  - CMV DNA PCR pending  - Check Norovirus PCR  - Start rehydration with IV NS at 75 cc/hr  - Clear liquids for now  - Consulted GI - plan upper and lower endoscopy tomorrow        Cough, persistent    - CXR on admit showed moderate right pleural effusion stable  - Sinus X ray on admit showed partial opacification of the maxillary sinuses, but no large air-fluid level is identified  - No O2 desats noted  - Will check PFT's  - Continue PPI  - Antitussives  - Consult ENT          Restrictive cardiomyopathy    - S/P thoracentesis X 2, followed by VATS/pleurex cath placement (January 2018) with removal of pleurex (February 2018) and 3rd thoracentesis 2/14/18 with no significant findings on fluid removal. Moderate right pleural effusion stable by CXR 3/11/18  - Last paracentesis was in January. Will get abd US to eval for ascites (abd not tense at all)  - D/C Torsemide for now given watery diarrhea        Heart transplanted    - Last TTE done 12/12/17 showed normal graft function. Will repeat TTE today  - Had -ve DSE on 11/30/17  - Current immunosuppression: Myfortic 720 mg bid, Pred 2.5 mg qd and Prograf 3 mg bid with goal level 6-9. Level today was 14.3, but has been fine past several checks - will repeat in AM          Leukopenia    - WCC trending down at 3.05 - checking CMV DNA PCR as  above, also Norovirus PCR        CKD (chronic kidney disease), stage IV    - Creatinine 3.4 on admit, 3.1 this morning after getting 500 cc IV NS in the ED (baseline ~ 2.6-3.0)  - Stopping Torsemide as above and starting IV NS at 75 cc/hr        Type 1 diabetes mellitus with stage 3 chronic kidney disease    - Hold home insulin regimen given poor appetite, clear liquid diet. SSI        Hypothyroidism due to Hashimoto's thyroiditis    - TSH low at 0.101 with normal FT4. Will consult Endocrine to see if Synthroid needs to be decreased in the setting [place] [id number]  Heart Transplant  [facility]

## 2018-03-12 NOTE — PROGRESS NOTES
Dr. Paris w/ [facility] notified pt w/ little urine output today despite IV fluids started this am.  150 cc w/ 1 unmeasured noted.  Per MD orders given to increase IV fluids to 100 cc/hr and if urine output does not increase this evening to in & out cath patient.     Will pass on orders to night nurse and continue to monitor.

## 2018-03-12 NOTE — PLAN OF CARE
Pt AAOx4, VSS.  On TELE, HR 110s, NSR.  Coughing spells noted.  Cough syrup added to MAR PRN, given once so far this shift with some relief obtained.   Dyspnea on exertion noted at times. Pt denies SOB at rest.  O2 sats > 93% on RA.  Chest xray on admit w/ right pleural effusion noted but stable.   PFTs ordered.  ENT consulted.  Abdomen distended.  Pt denies any pain to abdomen. Last paracentesis done January.  Abd ultrasound done this AM - ascites throughout abdomen noted.  Multiple loose stools noted today.  Per pt this is an ongoing issue for the last month.  Pt switched from Cellcept to myfortic and issue still unresolved.  Cdiff negative.  Isolation discontinued.  Other stool studies still pending.    GI consulted. Plan for EGD and colonoscopy in the am.    Pt on clear liquid diet.  Poor appetite noted, however pt admits this isn't new.  Blood sugars checked ACHS.  Orders for NPO at midnight.  Plan to start bowel prep this evening around 6pm.  AM labs monitored.  Scr 3.4.  Torsemide d/c'd and fluids started, NS @ 75 cc/hr.    Fall risk noted.  Pt w/ history of fall at home prior to admit.  Precautions in place.  Wife at bedside throughout the day.  Non-skid socks on feet.  Call bell in reach at all times.  Pt instructed not to get up without assistance.  Understanding verbalized.   No acute events/falls/injuries. See flowsheet for further assessment findings. Will continue to monitor.

## 2018-03-12 NOTE — ASSESSMENT & PLAN NOTE
- No improvement in watery stools despite changing Cellcept back to Myfortic on Thursday  - C. Diff -ve. Other stool studies pending  - CMV DNA PCR pending  - Check Norovirus PCR  - Start rehydration with IV NS at 75 cc/hr  - Clear liquids for now  - Consulted GI - plan upper and lower endoscopy tomorrow

## 2018-03-12 NOTE — ASSESSMENT & PLAN NOTE
- Last TTE done 12/12/17 showed normal graft function. Will repeat TTE today  - Had -ve DSE on 11/30/17  - Current immunosuppression: Myfortic 720 mg bid, Pred 2.5 mg qd and Prograf 3 mg bid with goal level 6-9. Level today was 14.3, but has been fine past several checks - will repeat in AM

## 2018-03-12 NOTE — ASSESSMENT & PLAN NOTE
Continue myfortic/prednisone/prograf as per home dose (hpi)  Echo ordered  No need for DSA per staff at this time  Patient is dry/euvolemic  Got 1L in ED, no need for more at this time  Admit to floor

## 2018-03-12 NOTE — HPI
Reason for Consult: abnormal TFTs    Surgical Procedure and Date: s/p heart transplant 2014     Diabetes diagnosis year: 7yo (T1DM)     Home Diabetes Medications:    Levemir 15u qHS  Novolog 4u AC     How often checking glucose at home? 4 times daily   BG readings on regimen: 150-200s  Hypoglycemia on the regimen?  Yes, rarely - treats appropriately (light snack, glucose tab)  Missed doses on regimen?  No        Diabetes Complications include:     Hyperglycemia, Hypoglycemia  and Diabetic chronic kidney disease          Complicating diabetes co morbidities:   N/A     HPI:   Patient is a 42 y.o. male with a diagnosis of T1DM, HTN, hypothyroidism, s/p heart transplant.  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 He required serial paracentesis and thoracentesis until he underwent a VATS with pleurX catheter placement on 1/11/2018 and removed 2/7/18.       Presented to hospital secondary to diarrhea and cough.  Upon initial labs, TSH was decreased (0.101) and free T4 1.33.  Endocrinology consulted to assist in management of these labs.  He was last seen in clinic by Kamala Vázquez NP 11/2017.  Previous hospitalization, endocrine consulted for same problem.  During that visit, recommended decreasing LT4 to 125mcg daily. Unfortunately, patient was discharged on previous dose of 150mcg daily.

## 2018-03-12 NOTE — ASSESSMENT & PLAN NOTE
- Creatinine 3.4 on admit, 3.1 this morning after getting 500 cc IV NS in the ED (baseline ~ 2.6-3.0)  - Stopping Torsemide as above and starting IV NS at 75 cc/hr

## 2018-03-12 NOTE — SUBJECTIVE & OBJECTIVE
Past Medical History:   Diagnosis Date    Arthritis     Ascites         Cardiomyopathy     Depression                       Hashimoto's disease              History of coronary artery disease                  HLD (hyperlipidemia) 6/11/2015     type 1 diabetes mellitus              Pleural effusion due to another disorder          12/12/2017 6/30/2015    Type I diabetes mellitus, well controlled          05/29/2014           Past Surgical History:   Procedure Laterality Date                CHOLECYSTECTOMY      CORONARY ANGIOPLASTY      FOOT SPLIT TRANSFER OF THE POSTERIOR TIBIALIS TENDON PROCEDURE      HEART TRANSPLANT  2014    KNEE SURGERY      PleuRx catheter placement  01/11/2018 November 2014    stent placemnet         Review of patient's allergies indicates:   Allergen Reactions    No known drug allergies        No current facility-administered medications on file prior to encounter.      Current Outpatient Prescriptions on File Prior to Encounter   Medication Sig    aspirin (ECOTRIN) 81 MG EC tablet Take 1 tablet (81 mg total) by mouth once daily.    escitalopram oxalate (LEXAPRO) 20 MG tablet Take 1 tablet (20 mg total) by mouth once daily.    gabapentin (NEURONTIN) 300 MG capsule Take 3 capsules (900 mg total) by mouth 3 (three) times daily.    insulin aspart (NOVOLOG) 100 unit/mL InPn pen Inject 4 Units into the skin 3 (three) times daily.    insulin detemir (LEVEMIR FLEXTOUCH) 100 unit/mL (3 mL) SubQ InPn pen Inject 15 Units into the skin every evening. (Patient taking differently: Inject 15 Units into the skin 2 (two) times daily. )    lancets Misc 1 Device by Misc.(Non-Drug; Combo Route) route 4 (four) times daily with meals and nightly.    levothyroxine (SYNTHROID) 150 MCG tablet Take 1 tablet (150 mcg total) by mouth every morning.    magnesium oxide (MAG-OX) 400 mg tablet Take 1 tablet (400 mg total) by mouth once daily.    mycophenolate (MYFORTIC) 360 MG  "TbEC Take 2 tablets (720 mg total) by mouth 2 (two) times daily.    nortriptyline (PAMELOR) 25 MG capsule Take 1 capsule (25 mg total) by mouth every evening.    ondansetron (ZOFRAN-ODT) 4 MG TbDL Take 2 tablets (8 mg total) by mouth every 8 (eight) hours as needed (nausea).    pantoprazole (PROTONIX) 40 MG tablet TAKE ONE TABLET BY MOUTH EVERY DAY    pravastatin (PRAVACHOL) 40 MG tablet Take 1 tablet (40 mg total) by mouth every evening. (Patient taking differently: Take 40 mg by mouth once daily. )    predniSONE (DELTASONE) 2.5 MG tablet Take 1 tablet (2.5 mg total) by mouth once daily. S/P Heart Transplant 11/18/2014 ICD-10 Z94.1    tacrolimus (PROGRAF) 1 MG Cap Taked 3mg orally in the morning and 3mg orally in the evening. S/p heart transplant  11/18/2014  ICD-10 Z94.1    tamsulosin (FLOMAX) 0.4 mg Cp24 TAKE 2 CAPSULES(0.8 MG) BY MOUTH EVERY EVENING    torsemide (DEMADEX) 20 MG Tab Take 2 tablets (40 mg total) by mouth once daily.    pen needle, diabetic 32 gauge x 5/32" Ndle Uses 5 pen needles a day       Social History Main Topics    Smoking status: Never Smoker    Smokeless tobacco: Never Used    Alcohol use No    Drug use: No    Sexual activity: Yes     Partners: Female     Review of Systems   Constitution: Positive for weakness, malaise/fatigue and weight loss. Negative for fever.   HENT: Positive for congestion.    Eyes: Negative.    Cardiovascular: Positive for near-syncope and syncope. Negative for chest pain, irregular heartbeat, leg swelling, orthopnea and palpitations.   Respiratory: Positive for cough.    Endocrine: Negative.    Hematologic/Lymphatic: Negative.    Skin: Negative.    Musculoskeletal: Negative.    Gastrointestinal: Positive for diarrhea. Negative for abdominal pain.   Genitourinary: Negative.    Psychiatric/Behavioral: Negative.      Objective:     Vital Signs (Most Recent):  Temp: 99.4 °F (37.4 °C) (03/11/18 2125)  Pulse: 110 (03/11/18 2212)  Resp: (!) 23 (03/11/18 " 2212)  BP: (!) 101/53 (03/11/18 2212)  SpO2: 95 % (03/11/18 2212) Vital Signs (24h Range):  Temp:  [99.1 °F (37.3 °C)-99.4 °F (37.4 °C)] 99.4 °F (37.4 °C)  Pulse:  [108-127] 110  Resp:  [13-23] 23  SpO2:  [94 %-96 %] 95 %  BP: ()/(49-56) 101/53     Weight: 82.1 kg (181 lb) Recorded by nursing staff - unknown how.  Body mass index is 28.35 kg/m².    SpO2: 95 %  O2 Device (Oxygen Therapy): room air    Physical Exam   Constitutional: He is oriented to person, place, and time. He appears well-developed and well-nourished. No distress.   HENT:   Head: Normocephalic and atraumatic.   Mouth/Throat: No oropharyngeal exudate.   Eyes: EOM are normal. No scleral icterus.   Neck: JVD (to mid neck sitting up, however prominent V-waves difficult to discern baseline) present.   Cardiovascular: Regular rhythm.  Exam reveals no gallop and no friction rub.    Murmur heard.  Tachycardic   Pulmonary/Chest: Effort normal. No respiratory distress. He has no wheezes. He has no rales. He exhibits no tenderness.   Decreased breath sounds right lung base   Abdominal: Soft. He exhibits no distension. There is no tenderness. There is no rebound.   Musculoskeletal: Normal range of motion. He exhibits no edema.   Neurological: He is alert and oriented to person, place, and time.   Skin: Skin is warm and dry. He is not diaphoretic. No erythema.   Psychiatric: He has a normal mood and affect.     Significant Labs:   CMP   Recent Labs  Lab 03/11/18 1922     136   K 4.6  4.6     101   CO2 21*  21*   *  166*   BUN 35*  35*   CREATININE 3.4*  3.4*   CALCIUM 9.8  9.8   PROT 6.9   ALBUMIN 3.2*   BILITOT 1.3*   ALKPHOS 317*   AST 18   ALT 9*   ANIONGAP 14  14   ESTGFRAFRICA 24.0*  24.0*   EGFRNONAA 20.8*  20.8*    and CBC   Recent Labs  Lab 03/11/18  1922   WBC 3.30*   HGB 8.2*   HCT 25.1*

## 2018-03-12 NOTE — ASSESSMENT & PLAN NOTE
- S/P thoracentesis X 2, followed by VATS/pleurex cath placement (January 2018) with removal of pleurex (February 2018) and 3rd thoracentesis 2/14/18 with no significant findings on fluid removal. Moderate right pleural effusion stable by CXR 3/11/18  - Last paracentesis was in January. Will get abd US to eval for ascites (abd not tense at all)  - D/C Torsemide for now given watery diarrhea

## 2018-03-12 NOTE — ASSESSMENT & PLAN NOTE
Multiple stool studies ordered  Including norvirus pcr and rotavirus antigen  If all stool cultures negative consider immodium  Afebrile  Wbc normal  Creat elevated but possibly at new baseline -- iv hydration as noted above

## 2018-03-12 NOTE — ASSESSMENT & PLAN NOTE
- TSH low at 0.101 with normal FT4. Will consult Endocrine to see if Synthroid needs to be decreased in the setting of severe diarrhea

## 2018-03-12 NOTE — ANESTHESIA PREPROCEDURE EVALUATION
[facility]  Anesthesia Pre-Operative Evaluation         Patient Name: Lv Crocker  YOB: 1975  MRN: [id number]    SUBJECTIVE:     Pre-operative evaluation for Procedure(s) (LRB):  ESOPHAGOGASTRODUODENOSCOPY (EGD) (N/A)  COLONOSCOPY (N/A)     03/12/2018    Lv Crocker is a 42 y.o. male w/ a significant PMHx of hashimoto's thyroidits, DM1 on insulin, s/p heart transplant 2014 ( chronically on myfortic/prednisone/prograf; post op course complicated by [facility], treated 04/15 with 5 days PP, IVIG x 2 doses, was scheduled for Rituximab on 5/1/15 but developed pancytopenia in April 2015 after which he got CMV and c. Dif at the same time), chronic pleural effusions s/p VATS and pleurx catheter placement 1/11/2018 s/p pleurx cath removal last month now admitted with fevers and diarrhea who presents for the above procedure.    Echo completed today; last in December with pEF 60%, no DD and PAP 40    LDA:   20 G L hand     Prev airway:   Present Prior to Hospital Arrival?: No; Placement Date: 01/11/18; Placement Time: 0738; Method of Intubation: Direct laryngoscopy; Inserted by: CRNA; Airway Device: Endotracheal Tube (double lumen); Mask Ventilation: Easy; Intubated: Postinduction; Blade: Johnson #2; Airway Device Size:  (Left 39F); Style: Cuffed; Cuff Inflation: Minimal occlusive pressure; Inflation Amount:  (0.5 bronchial 2 tracheal); Placement Verified By: Auscultation, Capnometry, ETT Condensation; Grade: Grade I; Complicating Factors: Anterior larynx, Small mouth, Obesity, Short neck; Intubation Findings: Positive EtCO2, Bilateral breath sounds, Atraumatic/Condition of teeth unchanged;  Depth of Insertion: 27; Securment: Lips; Complications: None; Breath Sounds: Equal Bilateral; Insertion Attempts: 1; Removal Date: 01/11/18;  Removal Time: 0845    Drips:    sodium chloride 0.9% 75 mL/hr at 03/12/18 1016         Patient Active Problem List   Diagnosis    Current use of  steroid medication    Need for prophylactic immunotherapy    Tricuspid valve disorders, specified as nonrheumatic    Anxiety    PVD (peripheral vascular disease)    GERD (gastroesophageal reflux disease)    Debility    Sciatica    Disorder of magnesium metabolism    Subacute effusive constrictive pericarditis    Heart transplanted    CKD (chronic kidney disease), stage IV    History of restrictive cardiomyopathy    CMV infection, acute    Hypothyroidism due to Hashimoto's thyroiditis    Thoracic compression fracture    Type 1 diabetes mellitus with hyperglycemia    Anemia of chronic renal failure, stage 3 (moderate)    Transplant follow-up    Type 1 diabetes mellitus with stage 3 chronic kidney disease    Hyperlipidemia LDL goal <70    Iron deficiency anemia    Other ascites    Osteopenia    Fatigue    Positive urine culture    Pleural effusion    Diarrhea    Cough, persistent    Restrictive cardiomyopathy    Leukopenia       Review of patient's allergies indicates:   Allergen Reactions    No known drug allergies        Current Inpatient Medications:   aspirin  81 mg Oral Daily    enoxaparin  30 mg Subcutaneous Daily    escitalopram oxalate  20 mg Oral Daily    gabapentin  900 mg Oral TID    levothyroxine  150 mcg Oral QAM    mycophenolate  720 mg Oral BID    nortriptyline  25 mg Oral QHS    pantoprazole  40 mg Oral Daily    polyethylene glycol  4,000 mL Oral Once    pravastatin  40 mg Oral Daily    predniSONE  2.5 mg Oral Daily    sodium chloride 0.9%  3 mL Intravenous Q8H    tacrolimus  3 mg Oral BID    tamsulosin  0.4 mg Oral QHS       No current facility-administered medications on file prior to encounter.      Current Outpatient Prescriptions on File Prior to Encounter   Medication Sig Dispense Refill    aspirin (ECOTRIN) 81 MG EC tablet Take 1 tablet (81 mg total) by mouth once daily. 30 tablet 11    escitalopram oxalate (LEXAPRO) 20 MG tablet Take 1 tablet (20 mg  total) by mouth once daily. 30 tablet 11    gabapentin (NEURONTIN) 300 MG capsule Take 3 capsules (900 mg total) by mouth 3 (three) times daily. 90 capsule 1    insulin aspart (NOVOLOG) 100 unit/mL InPn pen Inject 4 Units into the skin 3 (three) times daily. 3.6 mL 11    insulin detemir (LEVEMIR FLEXTOUCH) 100 unit/mL (3 mL) SubQ InPn pen Inject 15 Units into the skin every evening. (Patient taking differently: Inject 15 Units into the skin 2 (two) times daily. ) 4.5 mL 11    lancets Misc 1 Device by Misc.(Non-Drug; Combo Route) route 4 (four) times daily with meals and nightly. 100 each 5    levothyroxine (SYNTHROID) 150 MCG tablet Take 1 tablet (150 mcg total) by mouth every morning. 30 tablet 12    magnesium oxide (MAG-OX) 400 mg tablet Take 1 tablet (400 mg total) by mouth once daily. 30 tablet 11    mycophenolate (MYFORTIC) 360 MG TbEC Take 2 tablets (720 mg total) by mouth 2 (two) times daily. 120 tablet 11    nortriptyline (PAMELOR) 25 MG capsule Take 1 capsule (25 mg total) by mouth every evening. 30 capsule 3    ondansetron (ZOFRAN-ODT) 4 MG TbDL Take 2 tablets (8 mg total) by mouth every 8 (eight) hours as needed (nausea). 15 tablet 0    pantoprazole (PROTONIX) 40 MG tablet TAKE ONE TABLET BY MOUTH EVERY DAY 30 tablet 11    pravastatin (PRAVACHOL) 40 MG tablet Take 1 tablet (40 mg total) by mouth every evening. (Patient taking differently: Take 40 mg by mouth once daily. ) 30 tablet 11    predniSONE (DELTASONE) 2.5 MG tablet Take 1 tablet (2.5 mg total) by mouth once daily. S/P Heart Transplant 11/18/2014 ICD-10 Z94.1 30 tablet 11    tacrolimus (PROGRAF) 1 MG Cap Taked 3mg orally in the morning and 3mg orally in the evening. S/p heart transplant  11/18/2014  ICD-10 Z94.1 60 capsule 0    tamsulosin (FLOMAX) 0.4 mg Cp24 TAKE 2 CAPSULES(0.8 MG) BY MOUTH EVERY EVENING 60 capsule 11    torsemide (DEMADEX) 20 MG Tab Take 2 tablets (40 mg total) by mouth once daily. 60 tablet 11    pen needle,  "diabetic 32 gauge x 5/32" Ndle Uses 5 pen needles a day 200 each 12       Past Surgical History:   Procedure Laterality Date    CARDIAC CATHETERIZATION      CARDIAC SURGERY      CHOLECYSTECTOMY      CORONARY ANGIOPLASTY      FOOT SPLIT TRANSFER OF THE POSTERIOR TIBIALIS TENDON PROCEDURE      HEART TRANSPLANT  2014    KNEE SURGERY      PleuRx catheter placement  01/11/2018    Plan for removal after 1-2 months by Dr. James in cardiothoracic surgery    s/p oht  November 2014    stent placemnet         Social History     Social History    Marital status:      Spouse name: N/A    Number of children: N/A    Years of education: N/A     Occupational History    Not on file.     Social History Main Topics    Smoking status: Never Smoker    Smokeless tobacco: Never Used    Alcohol use No    Drug use: No    Sexual activity: Yes     Partners: Female     Other Topics Concern    Not on file     Social History Narrative            Breakfast: Skips 80%; cereal; Diet Sprite    Lunch: Turkey or chicken sandwich on white bread; Diet Sprite    Dinner: Grilled burgers, small amount chips; Diet Sprite    Snacks: Ronny crackers before bed on most nights    Eats out: 2x/wk    Water: 0    PA: Walks 15 minutes (strenuous) daily    Goal weight 170-175 lbs by 1/2018       OBJECTIVE:     Vital Signs Range (Last 24H):  Temp:  [37 °C (98.6 °F)-37.4 °C (99.4 °F)]   Pulse:  [108-127]   Resp:  [13-23]   BP: ()/(45-59)   SpO2:  [93 %-96 %]       CBC:   Recent Labs      03/11/18 1922  03/12/18   0443   WBC  3.30*  3.05*   RBC  2.78*  2.62*   HGB  8.2*  7.7*   HCT  25.1*  24.1*   PLT  218  187   MCV  90  92   MCH  29.5  29.4   MCHC  32.7  32.0       CMP:   Recent Labs      03/11/18 1922  03/12/18   0443   NA  136  136  136   K  4.6  4.6  4.9   CL  101  101  103   CO2  21*  21*  20*   BUN  35*  35*  35*   CREATININE  3.4*  3.4*  3.1*   GLU  166*  166*  121*   MG   --   1.4*   CALCIUM  9.8  9.8  9.3 "   ALBUMIN  3.2*  2.9*   PROT  6.9  6.2   ALKPHOS  317*  298*   ALT  9*  8*   AST  18 17   BILITOT  1.3*  1.2*       INR:  No results for input(s): PT, INR, PROTIME, APTT in the last 72 hours.    Diagnostic Studies: No relevant studies.    EKG:  3/11/18  Sinus tachycardia  Possible Right ventricular hypertrophy  T wave abnormality, consider inferior ischemia  T wave abnormality, consider anterolateral ischemia  Abnormal ECG  When compared with ECG of 11-FEB-2018 11:25,  No significant change was found    2D ECHO:  Results for orders placed or performed during the hospital encounter of 12/11/17   2D echo with color flow doppler   Result Value Ref Range    EF 60 55 - 65    Mitral Valve Regurgitation TRIVIAL TO MILD     Diastolic Dysfunction No     Est. PA Systolic Pressure 40.95 (A)     Tricuspid Valve Regurgitation TRIVIAL          ASSESSMENT/PLAN:         Pre-op Assessment    I have reviewed the Patient Summary Reports.    I have reviewed the Nursing Notes.   I have reviewed the Medications.   Steroids Taken In Past Year: Prednisone    Review of Systems  Anesthesia Hx:  No problems with previous Anesthesia  History of prior surgery of interest to airway management or planning: Previous anesthesia: General  Procedure performed at an [facility]. Airway issues documented on chart review include mask, easy   Denies Personal Hx of Anesthesia complications.   Cardiovascular:   Hypertension Past MI CAD   CHF ECG has been reviewed. S/p transplant   Pulmonary:   Pleural effusions s/p VATs and pleurx   Renal/:   Chronic Renal Disease    Hepatic/GI:   GERD    Neurological:   Neuromuscular Disease,    Endocrine:   Diabetes, type 2 Hypothyroidism    Psych:   Psychiatric History          Physical Exam  General:  Well nourished    Airway/Jaw/Neck:  Airway Findings: Mouth Opening: Normal Tongue: Normal  General Airway Assessment: Adult, Possible difficult mask airway  Mallampati: II  TM Distance: Normal, at least 6 cm   Jaw/Neck Findings:  Neck ROM: Normal ROM      Dental:  Dental Findings: In tact   Chest/Lungs:  Chest/Lungs Findings: Clear to auscultation     Heart/Vascular:  Heart Findings: Rate: Normal  Rhythm: Regular Rhythm  Sounds: Normal        Mental Status:  Mental Status Findings:  Cooperative, Alert and Oriented         Anesthesia Plan  Type of Anesthesia, risks & benefits discussed:  Anesthesia Type:  general, MAC  Patient's Preference:   Intra-op Monitoring Plan: standard ASA monitors and arterial line  Intra-op Monitoring Plan Comments:   Post Op Pain Control Plan: multimodal analgesia, IV/PO Opioids PRN and per primary service following discharge from PACU  Post Op Pain Control Plan Comments:   Induction:   IV  Beta Blocker:  Patient is not currently on a Beta-Blocker (No further documentation required).       Informed Consent: Patient understands risks and agrees with Anesthesia plan.  Questions answered. Anesthesia consent signed with patient.  ASA Score: 3     Day of Surgery Review of History & Physical:  There are no significant changes.            While in the endoscopy pre-procedure area, the patient began developing increasing SOB and inability to lie flat.  In addition, his O2 requirement was 4-5 L/min to maintain a SPO2 in the low 90s and eventually was also dipping into the high 80s.  He also had a new fever of 38.2.  I do not believe he will safely tolerate the procedure with anesthesia today in his current condition and we will post-pone.  Recommend further w/u for his respiratory failure.  Consider CT surgery consult.    GREGORIO Dee MD  Staff Anesthesiologist

## 2018-03-12 NOTE — CONSULTS
"[facility]  Gastroenterology  Consult Note    Patient Name: Lv Crocker  MRN: [id number]  Admission Date: 3/11/2018  Hospital Length of Stay: 0 days  Code Status: Full Code   Attending Provider: Behzad Lara Jr.,*   Consulting Provider: Cholo Bonner MD  Primary Care Physician: Philippe Mohr MD  Principal Problem:Diarrhea    Inpatient consult to Gastroenterology  Consult performed by: CHOLO BONNER  Consult ordered by: SILVIANO BRINK        Subjective:     HPI:  This is a 43 yo M with hashimoto's thyroidits, DM1 on insulin, s/p heart transplant 2014 admitted for ongoing diarrhea for past 3-4 weeks and cough, sinus congestion. He was admitted from 2/11 to 2/14 and was reporting diarrhea that started since around then. Diarrhea is watery, nonbloody, and occurs >10 times a day. He is having nocturnal symptoms as well. Reports some right lower quadrant abdominal cramping. Denies any fevers or chills. He denies any recent travel or sick contacts. He was started on cellcept 2/20 but his diarrhea started before then. Since being here, he is off the cellcept but has not noticed significant improvement in his diarrhea.    Past Medical History:   Diagnosis Date    Arthritis     Ascites     Thought to be 2/2 heart tpx; liver bx 3/31/17 w/o significant fibrosis    Cardiomyopathy     Depression     Controlled "for the most part" on Lexipro    Encounter for blood transfusion     during transplant    GERD (gastroesophageal reflux disease)     Hashimoto's disease     History of CHF (congestive heart failure)     Previously EF 20% (prior to heart transplant); last EF 60%, PAP 41 as of 12/12/17; throught to be 2/2 genetics & DM1    History of coronary artery disease     H/o Coronary artery disease; resolved since heart transplant 2014    History of myocardial infarction     h/o MI x3 prior to heart transplant    HLD (hyperlipidemia) 6/11/2015    Hypoglycemia unawareness in type 1 " diabetes mellitus     Hypothyroid     Initially hyperthyroid 2/2 Hoshimoto's thyroiditis; now on levothyroxine 150 mcg qd    Pleural effusion due to another disorder     Thought to be 2/2 heart tpx; s/p PleuRx catheter placement and removal after 1-2 months    Pulmonary hypertension assoc with unclear multi-factorial mechanisms 12/12/2017    PAP 41 (EF 60%) on ECHO 12/12/17    Syncope 6/30/2015    Type I diabetes mellitus, well controlled     Well controlled; Dx'd @5y/o; on N insulin 20U BID & Humalog 10U w/meals +SSI; Glu 89 11/9/17; A1c 7.2% 4/5/17    Unspecified essential hypertension 05/29/2014    Well controlled; Last /57 on 11/9/17       Past Surgical History:   Procedure Laterality Date    CARDIAC CATHETERIZATION      CARDIAC SURGERY      CHOLECYSTECTOMY      CORONARY ANGIOPLASTY      FOOT SPLIT TRANSFER OF THE POSTERIOR TIBIALIS TENDON PROCEDURE      HEART TRANSPLANT  2014    KNEE SURGERY      PleuRx catheter placement  01/11/2018    Plan for removal after 1-2 months by Dr. James in cardiothoracic surgery    s/p oht  November 2014    stent placemnet         Review of patient's allergies indicates:   Allergen Reactions    No known drug allergies      Family History     Problem Relation (Age of Onset)    Cancer Brother (50)    Diabetes Mother, Father, Brother, Son, Sister, Maternal Uncle, Paternal Aunt, Maternal Grandmother, Maternal Grandfather    Heart disease Mother, Brother    Hypertension Mother, Father, Brother    Kidney disease Mother, Father    Stroke Father, Brother    Vision loss Brother        Social History Main Topics    Smoking status: Never Smoker    Smokeless tobacco: Never Used    Alcohol use No    Drug use: No    Sexual activity: Yes     Partners: Female     Review of Systems   Constitutional: Positive for activity change and appetite change. Negative for chills and fever.   HENT: Negative for sore throat and trouble swallowing.    Respiratory: Negative for  cough and shortness of breath.    Cardiovascular: Negative for chest pain and leg swelling.   Gastrointestinal: Positive for diarrhea and nausea. Negative for abdominal distention, abdominal pain, blood in stool, constipation and vomiting.   Genitourinary: Negative for decreased urine volume and difficulty urinating.   Musculoskeletal: Negative for arthralgias and back pain.   Skin: Negative for color change and pallor.   Neurological: Negative for dizziness and light-headedness.   Psychiatric/Behavioral: Negative for agitation and confusion.     Objective:     Vital Signs (Most Recent):  Temp: 99.4 °F (37.4 °C) (03/12/18 0730)  Pulse: (!) 111 (03/12/18 1100)  Resp: 18 (03/12/18 0730)  BP: (!) 91/45 (03/12/18 0730)  SpO2: (!) 93 % (03/12/18 0730) Vital Signs (24h Range):  Temp:  [99.1 °F (37.3 °C)-99.4 °F (37.4 °C)] 99.4 °F (37.4 °C)  Pulse:  [108-127] 111  Resp:  [13-23] 18  SpO2:  [93 %-96 %] 93 %  BP: ()/(45-56) 91/45     Weight: 78.9 kg (174 lb) (03/12/18 0100)  Body mass index is 27.25 kg/m².      Intake/Output Summary (Last 24 hours) at 03/12/18 1235  Last data filed at 03/12/18 0730   Gross per 24 hour   Intake             1280 ml   Output                0 ml   Net             1280 ml       Lines/Drains/Airways     Peripheral Intravenous Line                 Peripheral IV - Single Lumen 03/11/18 1931 Left Hand less than 1 day                Physical Exam   Vitals reviewed.      Significant Labs:  CBC:   Recent Labs  Lab 03/11/18  1922 03/12/18  0443   WBC 3.30* 3.05*   HGB 8.2* 7.7*   HCT 25.1* 24.1*    187     CMP:   Recent Labs  Lab 03/12/18  0443   *   CALCIUM 9.3   ALBUMIN 2.9*   PROT 6.2      K 4.9   CO2 20*      BUN 35*   CREATININE 3.1*   ALKPHOS 298*   ALT 8*   AST 17   BILITOT 1.2*     Coagulation: No results for input(s): PT, INR, APTT in the last 48 hours.        Assessment/Plan:     * Diarrhea    41 yo M s/p heart transplant in 2014 on immunosuppression who has been  having diarrhea for the past 3-4 weeks. Some stool studies are still pending but other workup has been negative so far. This may represent viral infection given his URI symptoms as well, however will rule out CMV colitis.    Recommendations:  - Clear liquid diet  - NPO after MN  - Bowel prep this evening  - Colonoscopy tomorrow        Iron deficiency anemia    Patient with anemia as well.    Will plan for EGD at same time as colonoscopy.            Thank you for your consult. I will follow-up with patient. Please contact us if you have any additional questions.    Cholo Colvin MD  Gastroenterology  [facility]    I was present with the fellow during the above evaluation, including history and exam.  I discussed the case with the fellow and agree with the findings and plan as documented in the fellow's note.

## 2018-03-12 NOTE — HPI
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 Most recently has had ongoing issues with his lungs, had gotten 2 thoracenteses, after which he underwent VATS 01/19/18 followed by pleurex catheter placement and effusion drainage 01/11/18, subsequently had it removed 02/07/18 after drainage had decreased despite multiple attempts to drain it. Has been having significant coughing fits that result in him being near-syncopal at times, although difficult to say if he has passed out or not- patient most recently was admitted to the hospital for increased AGUILAR, coughing and pre-syncope 02/11-02/14/18 at [facility].   Patient was started on antibiotics for suspected bacterial infection, with some diarrhea, bronchitis, and possible pneumonia. Ct chest showed some pleural fluid collection and after talking with Dr. James, we arranged for him to receive a diagnostic tap with IR, from which the fluid collection demonstrated no growth or significant findings at all really. Cell counts do not appear to have been sent but will recheck. Patient states since his hospital stay, yesterday had a significant coughing fit again, after which he nearly passed out, is being seen in clinic today for this. Denies any cardiopulmonary complaints, no leg swelling, has some abdominal swelling, which is moderate for him. Denies significant shortness of breath with mild exertion, had 6MWT yesterday to see if he qualified for home O2, and his O2 sats actually improved after recovery from where he started initially. He and his wife admit he is in bed most days, not very active.     Mr. Crocker presented to the ED 3/11/18 with approximately 3 weeks of worsening diarrhea and 2-3 days of sinus pressure, drainage, and worsened cough.  He notes that he had a coughing fit last night and passed  out, coughed throughout most of the night.  This also happened on 2/25/18.  He last saw Dr Ferreira in clinic on 2/20/18 where he was taken off myfortic and switched to cellcept (he hadn't been on cellcept because of leukopenia when they tried post-transplant).  Though his diarrhea had improved after his last discharge, he states it has increased now to 15x/day, clear/yellow/green, no fevers, no exacerbating foods per say.  He was taken back off the cellcept and placed back on myfortic this past week and has not noticed immediate change in diarrhea. He also reports his baseline coughing fits havent improved and are minimally responsive to tessalon pearls.  Came on last night, he lost consciousness during a fit once which is relatively normal for him, and had two more during HPI.  He notes no sick contacts but does state he's had some URI symptoms as above.  His baseline vitals are usually -120 and BP 90s/60s-110s/70s.  He reports a history of intermittent diarrhea - did have Cdiff many years ago but this smells different.  No abdominal pain, no nausea, no vomiting.  No blood in diarrhea.  Appears last c-scope in 2015 with no evidence of infection or inflammatory processes.  He does report IBS which is different that this.

## 2018-03-12 NOTE — PLAN OF CARE
Problem: Patient Care Overview  Goal: Plan of Care Review  Outcome: Ongoing (interventions implemented as appropriate)  Pt AAOx4, VSS ex HR tachy, in bed with upper siderails raised x2, bed in lowest/locked position, call light/personal belongings within reach. Pt instructed to call for assistance. Pt verbalizes understanding. Pt afebrile at this time.  Proper hand hygiene performed before and after pt care. Tele Tachy 110. Nightly dose of nortryptiline held, waiting for pharmacy to send up. Pt c/o of nausea, PRN Zofran administered. Will continue to monitor pt.

## 2018-03-12 NOTE — ASSESSMENT & PLAN NOTE
Abnormal TFTs upon admit.  Unclear if secondary to illness, but has been seen before in the past (during previous hospitalization).    Recommend decreasing dose of LT4 to 125mcg daily.  Repeat TFTs in 4 weeks.

## 2018-03-12 NOTE — SUBJECTIVE & OBJECTIVE
"Past Medical History:   Diagnosis Date    Arthritis     Ascites     Thought to be 2/2 heart tpx; liver bx 3/31/17 w/o significant fibrosis    Cardiomyopathy     Depression     Controlled "for the most part" on Lexipro    Encounter for blood transfusion     during transplant    GERD (gastroesophageal reflux disease)     Hashimoto's disease     History of CHF (congestive heart failure)     Previously EF 20% (prior to heart transplant); last EF 60%, PAP 41 as of 12/12/17; throught to be 2/2 genetics & DM1    History of coronary artery disease     H/o Coronary artery disease; resolved since heart transplant 2014    History of myocardial infarction     h/o MI x3 prior to heart transplant    HLD (hyperlipidemia) 6/11/2015    Hypoglycemia unawareness in type 1 diabetes mellitus     Hypothyroid     Initially hyperthyroid 2/2 Hoshimoto's thyroiditis; now on levothyroxine 150 mcg qd    Pleural effusion due to another disorder     Thought to be 2/2 heart tpx; s/p PleuRx catheter placement and removal after 1-2 months    Pulmonary hypertension assoc with unclear multi-factorial mechanisms 12/12/2017    PAP 41 (EF 60%) on ECHO 12/12/17    Syncope 6/30/2015    Type I diabetes mellitus, well controlled     Well controlled; Dx'd @5y/o; on N insulin 20U BID & Humalog 10U w/meals +SSI; Glu 89 11/9/17; A1c 7.2% 4/5/17    Unspecified essential hypertension 05/29/2014    Well controlled; Last /57 on 11/9/17       Past Surgical History:   Procedure Laterality Date    CARDIAC CATHETERIZATION      CARDIAC SURGERY      CHOLECYSTECTOMY      CORONARY ANGIOPLASTY      FOOT SPLIT TRANSFER OF THE POSTERIOR TIBIALIS TENDON PROCEDURE      HEART TRANSPLANT  2014    KNEE SURGERY      PleuRx catheter placement  01/11/2018    Plan for removal after 1-2 months by Dr. James in cardiothoracic surgery    s/p oht  November 2014    stent placemnet         Review of patient's allergies indicates:   Allergen Reactions    " No known drug allergies        No current facility-administered medications on file prior to encounter.      Current Outpatient Prescriptions on File Prior to Encounter   Medication Sig    aspirin (ECOTRIN) 81 MG EC tablet Take 1 tablet (81 mg total) by mouth once daily.    escitalopram oxalate (LEXAPRO) 20 MG tablet Take 1 tablet (20 mg total) by mouth once daily.    gabapentin (NEURONTIN) 300 MG capsule Take 3 capsules (900 mg total) by mouth 3 (three) times daily.    insulin aspart (NOVOLOG) 100 unit/mL InPn pen Inject 4 Units into the skin 3 (three) times daily.    insulin detemir (LEVEMIR FLEXTOUCH) 100 unit/mL (3 mL) SubQ InPn pen Inject 15 Units into the skin every evening. (Patient taking differently: Inject 15 Units into the skin 2 (two) times daily. )    lancets Misc 1 Device by Misc.(Non-Drug; Combo Route) route 4 (four) times daily with meals and nightly.    levothyroxine (SYNTHROID) 150 MCG tablet Take 1 tablet (150 mcg total) by mouth every morning.    magnesium oxide (MAG-OX) 400 mg tablet Take 1 tablet (400 mg total) by mouth once daily.    mycophenolate (MYFORTIC) 360 MG TbEC Take 2 tablets (720 mg total) by mouth 2 (two) times daily.    nortriptyline (PAMELOR) 25 MG capsule Take 1 capsule (25 mg total) by mouth every evening.    ondansetron (ZOFRAN-ODT) 4 MG TbDL Take 2 tablets (8 mg total) by mouth every 8 (eight) hours as needed (nausea).    pantoprazole (PROTONIX) 40 MG tablet TAKE ONE TABLET BY MOUTH EVERY DAY    pravastatin (PRAVACHOL) 40 MG tablet Take 1 tablet (40 mg total) by mouth every evening. (Patient taking differently: Take 40 mg by mouth once daily. )    predniSONE (DELTASONE) 2.5 MG tablet Take 1 tablet (2.5 mg total) by mouth once daily. S/P Heart Transplant 11/18/2014 ICD-10 Z94.1    tacrolimus (PROGRAF) 1 MG Cap Taked 3mg orally in the morning and 3mg orally in the evening. S/p heart transplant  11/18/2014  ICD-10 Z94.1    tamsulosin (FLOMAX) 0.4 mg Cp24 TAKE 2  "CAPSULES(0.8 MG) BY MOUTH EVERY EVENING    torsemide (DEMADEX) 20 MG Tab Take 2 tablets (40 mg total) by mouth once daily.    pen needle, diabetic 32 gauge x 5/32" Ndle Uses 5 pen needles a day     Family History     Problem Relation (Age of Onset)    Cancer Brother (50)    Diabetes Mother, Father, Brother, Son, Sister, Maternal Uncle, Paternal Aunt, Maternal Grandmother, Maternal Grandfather    Heart disease Mother, Brother    Hypertension Mother, Father, Brother    Kidney disease Mother, Father    Stroke Father, Brother    Vision loss Brother        Social History Main Topics    Smoking status: Never Smoker    Smokeless tobacco: Never Used    Alcohol use No    Drug use: No    Sexual activity: Yes     Partners: Female     Review of Systems   Constitution: Negative.   HENT: Positive for congestion.         Sinus pressure/drainage   Eyes: Negative.    Cardiovascular: Negative.    Respiratory: Positive for cough. Negative for sputum production.    Endocrine: Negative.    Skin: Negative.    Musculoskeletal: Negative.    Gastrointestinal: Positive for diarrhea.   Genitourinary: Negative.    Neurological: Negative.      Objective:     Vital Signs (Most Recent):  Temp: 99.3 °F (37.4 °C) (03/11/18 2013)  Pulse: (!) 112 (03/11/18 2013)  Resp: 18 (03/11/18 2013)  BP: (!) 100/56 (03/11/18 2013)  SpO2: 95 % (03/11/18 2013) Vital Signs (24h Range):  Temp:  [99.1 °F (37.3 °C)-99.3 °F (37.4 °C)] 99.3 °F (37.4 °C)  Pulse:  [112-127] 112  Resp:  [18-20] 18  SpO2:  [95 %] 95 %  BP: ()/(49-56) 100/56     Weight: 82.1 kg (181 lb)  Body mass index is 28.35 kg/m².    SpO2: 95 %  O2 Device (Oxygen Therapy): room air    No intake or output data in the 24 hours ending 03/11/18 2101    Lines/Drains/Airways     Peripheral Intravenous Line                 Peripheral IV - Single Lumen 03/11/18 1931 Left Hand less than 1 day                Physical Exam   Constitutional: He is oriented to person, place, and time. He appears " well-developed and well-nourished.   HENT:   Head: Normocephalic and atraumatic.   Eyes: Conjunctivae and EOM are normal. Pupils are equal, round, and reactive to light.   Neck: Normal range of motion. Neck supple. No JVD present.   Cardiovascular: Regular rhythm and normal heart sounds.  Tachycardia present.  Exam reveals no gallop and no friction rub.    No murmur heard.  No jvd  Peripherally warm, cap refill < 2s  No pitting edema   Pulmonary/Chest: Effort normal and breath sounds normal. No respiratory distress. He has no wheezes. He has no rales. He exhibits no tenderness.   Abdominal: Soft. Bowel sounds are normal. He exhibits no distension. There is no tenderness.   Musculoskeletal: Normal range of motion. He exhibits no edema or tenderness.   Neurological: He is alert and oriented to person, place, and time.   Skin: Skin is warm and dry. No erythema. No pallor.       Significant Labs:   ABG: No results for input(s): PH, PCO2, HCO3, POCSATURATED, BE in the last 48 hours., CMP   Recent Labs  Lab 03/11/18 1922     136   K 4.6  4.6     101   CO2 21*  21*   *  166*   BUN 35*  35*   CREATININE 3.4*  3.4*   CALCIUM 9.8  9.8   PROT 6.9   ALBUMIN 3.2*   BILITOT 1.3*   ALKPHOS 317*   AST 18   ALT 9*   ANIONGAP 14  14   ESTGFRAFRICA 24.0*  24.0*   EGFRNONAA 20.8*  20.8*   , CBC   Recent Labs  Lab 03/11/18 1922   WBC 3.30*   HGB 8.2*   HCT 25.1*      , INR No results for input(s): INR, PROTIME in the last 48 hours., Lipid Panel No results for input(s): CHOL, HDL, LDLCALC, TRIG, CHOLHDL in the last 48 hours. and Troponin   Recent Labs  Lab 03/11/18 1922   TROPONINI <0.006       Significant Imaging: Echocardiogram:   2D echo with color flow doppler:   Results for orders placed or performed during the hospital encounter of 12/11/17   2D echo with color flow doppler   Result Value Ref Range    EF 60 55 - 65    Mitral Valve Regurgitation TRIVIAL TO MILD     Diastolic Dysfunction No      Est. PA Systolic Pressure 40.95 (A)     Tricuspid Valve Regurgitation TRIVIAL    , EKG: sinus tach, rvh, diffuse twi unchanged from prior and X-Ray: CXR: stable r pleural effusion

## 2018-03-12 NOTE — ASSESSMENT & PLAN NOTE
BG goal 140-180  Lab Results   Component Value Date    HGBA1C 6.9 (H) 02/11/2018     Patient on Novolog 4u AC and Levemir 15u qHS  Patient on CLD, and received Levemir 10u once with subsequent hypoglycemia.    Will decrease to Levemir 5u BID, and titrate as necessary.  No scheduled prandial insulin at this time, secondary to clear liquid diet order and NPO status tonight.  POC AC/HS/0200    Titrate as necessary.    Discharge Recommendations:  TBD.

## 2018-03-12 NOTE — SUBJECTIVE & OBJECTIVE
"Past Medical History:   Diagnosis Date    Arthritis     Ascites     Thought to be 2/2 heart tpx; liver bx 3/31/17 w/o significant fibrosis    Cardiomyopathy     Depression     Controlled "for the most part" on Lexipro    Encounter for blood transfusion     during transplant    GERD (gastroesophageal reflux disease)     Hashimoto's disease     History of CHF (congestive heart failure)     Previously EF 20% (prior to heart transplant); last EF 60%, PAP 41 as of 12/12/17; throught to be 2/2 genetics & DM1    History of coronary artery disease     H/o Coronary artery disease; resolved since heart transplant 2014    History of myocardial infarction     h/o MI x3 prior to heart transplant    HLD (hyperlipidemia) 6/11/2015    Hypoglycemia unawareness in type 1 diabetes mellitus     Hypothyroid     Initially hyperthyroid 2/2 Hoshimoto's thyroiditis; now on levothyroxine 150 mcg qd    Pleural effusion due to another disorder     Thought to be 2/2 heart tpx; s/p PleuRx catheter placement and removal after 1-2 months    Pulmonary hypertension assoc with unclear multi-factorial mechanisms 12/12/2017    PAP 41 (EF 60%) on ECHO 12/12/17    Syncope 6/30/2015    Type I diabetes mellitus, well controlled     Well controlled; Dx'd @7y/o; on N insulin 20U BID & Humalog 10U w/meals +SSI; Glu 89 11/9/17; A1c 7.2% 4/5/17    Unspecified essential hypertension 05/29/2014    Well controlled; Last /57 on 11/9/17       Past Surgical History:   Procedure Laterality Date    CARDIAC CATHETERIZATION      CARDIAC SURGERY      CHOLECYSTECTOMY      CORONARY ANGIOPLASTY      FOOT SPLIT TRANSFER OF THE POSTERIOR TIBIALIS TENDON PROCEDURE      HEART TRANSPLANT  2014    KNEE SURGERY      PleuRx catheter placement  01/11/2018    Plan for removal after 1-2 months by Dr. James in cardiothoracic surgery    s/p oht  November 2014    stent placemnet         Review of patient's allergies indicates:   Allergen Reactions    " No known drug allergies      Family History     Problem Relation (Age of Onset)    Cancer Brother (50)    Diabetes Mother, Father, Brother, Son, Sister, Maternal Uncle, Paternal Aunt, Maternal Grandmother, Maternal Grandfather    Heart disease Mother, Brother    Hypertension Mother, Father, Brother    Kidney disease Mother, Father    Stroke Father, Brother    Vision loss Brother        Social History Main Topics    Smoking status: Never Smoker    Smokeless tobacco: Never Used    Alcohol use No    Drug use: No    Sexual activity: Yes     Partners: Female     Review of Systems   Constitutional: Positive for activity change and appetite change. Negative for chills and fever.   HENT: Negative for sore throat and trouble swallowing.    Respiratory: Negative for cough and shortness of breath.    Cardiovascular: Negative for chest pain and leg swelling.   Gastrointestinal: Positive for diarrhea and nausea. Negative for abdominal distention, abdominal pain, blood in stool, constipation and vomiting.   Genitourinary: Negative for decreased urine volume and difficulty urinating.   Musculoskeletal: Negative for arthralgias and back pain.   Skin: Negative for color change and pallor.   Neurological: Negative for dizziness and light-headedness.   Psychiatric/Behavioral: Negative for agitation and confusion.     Objective:     Vital Signs (Most Recent):  Temp: 99.4 °F (37.4 °C) (03/12/18 0730)  Pulse: (!) 111 (03/12/18 1100)  Resp: 18 (03/12/18 0730)  BP: (!) 91/45 (03/12/18 0730)  SpO2: (!) 93 % (03/12/18 0730) Vital Signs (24h Range):  Temp:  [99.1 °F (37.3 °C)-99.4 °F (37.4 °C)] 99.4 °F (37.4 °C)  Pulse:  [108-127] 111  Resp:  [13-23] 18  SpO2:  [93 %-96 %] 93 %  BP: ()/(45-56) 91/45     Weight: 78.9 kg (174 lb) (03/12/18 0100)  Body mass index is 27.25 kg/m².      Intake/Output Summary (Last 24 hours) at 03/12/18 1235  Last data filed at 03/12/18 0730   Gross per 24 hour   Intake             1280 ml   Output                 0 ml   Net             1280 ml       Lines/Drains/Airways     Peripheral Intravenous Line                 Peripheral IV - Single Lumen 03/11/18 1931 Left Hand less than 1 day                Physical Exam   Vitals reviewed.      Significant Labs:  CBC:   Recent Labs  Lab 03/11/18 1922 03/12/18  0443   WBC 3.30* 3.05*   HGB 8.2* 7.7*   HCT 25.1* 24.1*    187     CMP:   Recent Labs  Lab 03/12/18  0443   *   CALCIUM 9.3   ALBUMIN 2.9*   PROT 6.2      K 4.9   CO2 20*      BUN 35*   CREATININE 3.1*   ALKPHOS 298*   ALT 8*   AST 17   BILITOT 1.2*     Coagulation: No results for input(s): PT, INR, APTT in the last 48 hours.

## 2018-03-12 NOTE — ASSESSMENT & PLAN NOTE
41 yo M s/p heart transplant in 2014 on immunosuppression who has been having diarrhea for the past 3-4 weeks. Some stool studies are still pending but other workup has been negative so far. This may represent viral infection given his URI symptoms as well, however will rule out CMV colitis.    Recommendations:  - Clear liquid diet  - NPO after MN  - Bowel prep this evening  - Colonoscopy tomorrow

## 2018-03-13 PROBLEM — Z91.89 POTENTIAL IMPAIRMENT OF SKIN INTEGRITY: Status: ACTIVE | Noted: 2018-01-01

## 2018-03-13 PROBLEM — J96.01 ACUTE RESPIRATORY FAILURE WITH HYPOXIA: Status: ACTIVE | Noted: 2018-01-01

## 2018-03-13 PROBLEM — J18.9 PNEUMONIA: Status: ACTIVE | Noted: 2018-01-01

## 2018-03-13 PROBLEM — R09.02 HYPOXIA: Status: ACTIVE | Noted: 2018-01-01

## 2018-03-13 NOTE — ASSESSMENT & PLAN NOTE
- S/P thoracentesis X 2, followed by VATS/pleurex cath placement (January 2018) with removal of pleurex (February 2018) and 3rd thoracentesis 2/14/18 with no significant findings on fluid removal. Moderate right pleural effusion stable by CXR 3/11/18. Awaiting chest CT today  - Last paracentesis was in January. Abd US 3/12/18 confirmed ongoing ascites (not tense at all). Consider getting paracentesis this admit  - Torsemide on hold for now given watery diarrhea  - Plan RHC Thursday

## 2018-03-13 NOTE — SUBJECTIVE & OBJECTIVE
Medications:  Continuous Infusions:   sodium chloride 0.9% 100 mL/hr at 03/12/18 1803     Scheduled Meds:   aspirin  81 mg Oral Daily    enoxaparin  30 mg Subcutaneous Daily    escitalopram oxalate  20 mg Oral Daily    gabapentin  900 mg Oral TID    insulin detemir U-100  5 Units Subcutaneous BID    [START ON 3/13/2018] levothyroxine  125 mcg Oral QAM    mycophenolate  720 mg Oral BID    nortriptyline  25 mg Oral QHS    pantoprazole  40 mg Oral Daily    pravastatin  40 mg Oral Daily    predniSONE  2.5 mg Oral Daily    sodium chloride 0.9%  3 mL Intravenous Q8H    tacrolimus  3 mg Oral BID    tamsulosin  0.4 mg Oral QHS     PRN Meds:benzonatate, dextrose 50%, dextrose 50%, glucagon (human recombinant), glucose, glucose, insulin aspart U-100, ondansetron, promethazine-codeine 6.25-10 mg/5 ml     No current facility-administered medications on file prior to encounter.      Current Outpatient Prescriptions on File Prior to Encounter   Medication Sig    aspirin (ECOTRIN) 81 MG EC tablet Take 1 tablet (81 mg total) by mouth once daily.    escitalopram oxalate (LEXAPRO) 20 MG tablet Take 1 tablet (20 mg total) by mouth once daily.    gabapentin (NEURONTIN) 300 MG capsule Take 3 capsules (900 mg total) by mouth 3 (three) times daily.    insulin aspart (NOVOLOG) 100 unit/mL InPn pen Inject 4 Units into the skin 3 (three) times daily.    insulin detemir (LEVEMIR FLEXTOUCH) 100 unit/mL (3 mL) SubQ InPn pen Inject 15 Units into the skin every evening. (Patient taking differently: Inject 15 Units into the skin 2 (two) times daily. )    lancets Misc 1 Device by Misc.(Non-Drug; Combo Route) route 4 (four) times daily with meals and nightly.    levothyroxine (SYNTHROID) 150 MCG tablet Take 1 tablet (150 mcg total) by mouth every morning.    magnesium oxide (MAG-OX) 400 mg tablet Take 1 tablet (400 mg total) by mouth once daily.    mycophenolate (MYFORTIC) 360 MG TbEC Take 2 tablets (720 mg total) by mouth 2  "(two) times daily.    nortriptyline (PAMELOR) 25 MG capsule Take 1 capsule (25 mg total) by mouth every evening.    ondansetron (ZOFRAN-ODT) 4 MG TbDL Take 2 tablets (8 mg total) by mouth every 8 (eight) hours as needed (nausea).    pantoprazole (PROTONIX) 40 MG tablet TAKE ONE TABLET BY MOUTH EVERY DAY    pravastatin (PRAVACHOL) 40 MG tablet Take 1 tablet (40 mg total) by mouth every evening. (Patient taking differently: Take 40 mg by mouth once daily. )    predniSONE (DELTASONE) 2.5 MG tablet Take 1 tablet (2.5 mg total) by mouth once daily. S/P Heart Transplant 11/18/2014 ICD-10 Z94.1    tacrolimus (PROGRAF) 1 MG Cap Taked 3mg orally in the morning and 3mg orally in the evening. S/p heart transplant  11/18/2014  ICD-10 Z94.1    tamsulosin (FLOMAX) 0.4 mg Cp24 TAKE 2 CAPSULES(0.8 MG) BY MOUTH EVERY EVENING    torsemide (DEMADEX) 20 MG Tab Take 2 tablets (40 mg total) by mouth once daily.    pen needle, diabetic 32 gauge x 5/32" Ndle Uses 5 pen needles a day       Review of patient's allergies indicates:   Allergen Reactions    No known drug allergies        Past Medical History:   Diagnosis Date    Arthritis     Ascites     Thought to be 2/2 heart tpx; liver bx 3/31/17 w/o significant fibrosis    Cardiomyopathy     Depression     Controlled "for the most part" on Lexipro    Encounter for blood transfusion     during transplant    GERD (gastroesophageal reflux disease)     Hashimoto's disease     History of CHF (congestive heart failure)     Previously EF 20% (prior to heart transplant); last EF 60%, PAP 41 as of 12/12/17; throught to be 2/2 genetics & DM1    History of coronary artery disease     H/o Coronary artery disease; resolved since heart transplant 2014    History of myocardial infarction     h/o MI x3 prior to heart transplant    HLD (hyperlipidemia) 6/11/2015    Hypoglycemia unawareness in type 1 diabetes mellitus     Hypothyroid     Initially hyperthyroid 2/2 Jenniferhimocrescencio's " thyroiditis; now on levothyroxine 150 mcg qd    Pleural effusion due to another disorder     Thought to be 2/2 heart tpx; s/p PleuRx catheter placement and removal after 1-2 months    Pulmonary hypertension assoc with unclear multi-factorial mechanisms 12/12/2017    PAP 41 (EF 60%) on ECHO 12/12/17    Syncope 6/30/2015    Type I diabetes mellitus, well controlled     Well controlled; Dx'd @7y/o; on N insulin 20U BID & Humalog 10U w/meals +SSI; Glu 89 11/9/17; A1c 7.2% 4/5/17    Unspecified essential hypertension 05/29/2014    Well controlled; Last /57 on 11/9/17     Past Surgical History:   Procedure Laterality Date    CARDIAC CATHETERIZATION      CARDIAC SURGERY      CHOLECYSTECTOMY      CORONARY ANGIOPLASTY      FOOT SPLIT TRANSFER OF THE POSTERIOR TIBIALIS TENDON PROCEDURE      HEART TRANSPLANT  2014    KNEE SURGERY      PleuRx catheter placement  01/11/2018    Plan for removal after 1-2 months by Dr. James in cardiothoracic surgery    s/p oht  November 2014    stent placemnet       Family History     Problem Relation (Age of Onset)    Cancer Brother (50)    Diabetes Mother, Father, Brother, Son, Sister, Maternal Uncle, Paternal Aunt, Maternal Grandmother, Maternal Grandfather    Heart disease Mother, Brother    Hypertension Mother, Father, Brother    Kidney disease Mother, Father    Stroke Father, Brother    Vision loss Brother        Social History Main Topics    Smoking status: Never Smoker    Smokeless tobacco: Never Used    Alcohol use No    Drug use: No    Sexual activity: Yes     Partners: Female     Review of Systems  Objective:     Vital Signs (Most Recent):  Temp: 98.5 °F (36.9 °C) (03/12/18 1515)  Pulse: 105 (03/12/18 1548)  Resp: 18 (03/12/18 1515)  BP: (!) 100/52 (03/12/18 1515)  SpO2: (!) 93 % (03/12/18 1515) Vital Signs (24h Range):  Temp:  [98.5 °F (36.9 °C)-99.4 °F (37.4 °C)] 98.5 °F (36.9 °C)  Pulse:  [105-119] 105  Resp:  [13-23] 18  SpO2:  [93 %-96 %] 93 %  BP:  ()/(45-59) 100/52     Weight: 78.9 kg (174 lb)  Body mass index is 27.25 kg/m².      Date 03/12/18 0700 - 03/13/18 0659   Shift 9094-2360 7967-8996 9885-5816 24 Hour Total   I  N  T  A  K  E   P.O. 640 280  920    I.V.  (mL/kg) 205  (2.6) 300  (3.8)  505  (6.4)    Other 0   0    Shift Total  (mL/kg) 845  (10.7) 580  (7.3)  1425  (18.1)   O  U  T  P  U  T   Urine  (mL/kg/hr) 150  (0.2) 0  150    Shift Total  (mL/kg) 150  (1.9) 0  (0)  150  (1.9)   Weight (kg) 78.9 78.9 78.9 78.9       Physical Exam  General: Alert, well developed, comfortable  Voice: Regular for age, good volume  Respiratory: Occasional wet cough. Symmetric breathing, no stridor, no distress  Head: Normocephalic, no lesions  Face: Symmetric, HB 1/6 bilat, no lesions, no obvious sinus tenderness, salivary glands nontender  Eyes: Sclera white, extraocular movements intact  Nose: Bilateral floor septal spurs, dorsum straight, normal turbinate size, normal mucosa  Right Ear: Pinna and external ear appears normal  Left Ear: Pinna and external ear appears normal  Hearing: Grossly intact  Oral cavity/OP: Healthy mucosa, no masses or lesions including lips, gums, floor of mouth, palate, or tongue, normal pharyngeal wall movement  Neck: Supple, no palpable nodes, no masses, trachea midline, no thyroid masses  Cardiovascular system: Pulses regular in both upper extremities, good skin turgor      Nasal/Nasopharyngo/Laryn/Hypopharyngoscopy Procedures    Procedure:  Diagnostic nasal, nasopharyngoscopy, laryngoscopy and hypopharyngoscopy. With customary flexible endoscope.     NOSE:   External:  No gross deformity   Intranasal:    Mucosa:  No polyps, ulcers or lesions.    Septum:  Bilateral floor septal spurs    Turbinates:  Not enlarged.    Nasopharynx:  No lesions.   Mucosa:  No lesions.   Adenoids:  Present.   Posterior Choanae:  Patent.   Eustachian Tubes:  Patent.  Larynx/hypopharynx:   Epiglottis:  No lesions, without edema.   AE Folds:  No  lesions.   Vocal cords:  No polyps; nl mobility.    Subglottis: No obvious stenosis   Hypopharynx:  No lesions.   Piriform sinus:  No pooling or lesions.   Post Cricoid:  No edema or erythema          Significant Labs:  BMP:   Recent Labs  Lab 03/12/18  0443   *      CO2 20*   BUN 35*   CREATININE 3.1*   CALCIUM 9.3   MG 1.4*     CBC:   Recent Labs  Lab 03/12/18  0443   WBC 3.05*   RBC 2.62*   HGB 7.7*   HCT 24.1*      MCV 92   MCH 29.4   MCHC 32.0       Significant Diagnostics:  X-Ray: I have reviewed all pertinent results/findings within the past 24 hours and my personal findings are:  Lung field opacification. Pleural effusion

## 2018-03-13 NOTE — NURSING
Pt arrived from [facility] @1545, c/o sob and hemoptysis, ST on monitor 130's, o2 sats in the upper 80's, pt placed on face mask at 15 liter, [facility] and Dr. Lloyd notified and came to bedside, pt respiratory status improved with increased o2 supply, pulmonary consulted and to bedside, npo p mn for bronch, blood culture x2 done, antibiotics/antifungals and antivirals ordered and adm. Pt placed on comfort flow at 15l 60%o2, hr now 118, o2 sat 96%, will continue to monitor closely and carry out orders

## 2018-03-13 NOTE — ASSESSMENT & PLAN NOTE
- CXR on admit showed moderate right pleural effusion stable  - Sinus X ray on admit showed partial opacification of the maxillary sinuses, but no large air-fluid level is identified  - O2 desat with coughing, now on 5 LPM. Awaiting chest CT  - Will check PFT's  - Continue PPI  - Antitussives  - Appreciate ENT's help. Exam WNL

## 2018-03-13 NOTE — ASSESSMENT & PLAN NOTE
- No improvement in watery stools despite changing Cellcept back to Myfortic on Thursday  - C. Diff -ve. Other stool studies pending  - CMV DNA and Norovirus PCR pending  - Consulted GI - plan upper and lower endoscopy today

## 2018-03-13 NOTE — CONSULTS
[facility]  Infectious Disease  Consult Note    Patient Name: Lv Crocker  MRN: [id number]  Admission Date: 3/11/2018  Hospital Length of Stay: 1 days  Attending Physician: Jose A Lloyd MD  Primary Care Provider: Philippe Mohr MD     Isolation Status: No active isolations    Patient information was obtained from patient and past medical records.      Inpatient consult to Infectious Diseases  Consult performed by: MINO BROWN  Consult ordered by: SILVIANO BRINK  Reason for consult: pneumonia/diarrhea in OHT pt        Assessment/Plan:     Acute respiratory failure with hypoxia    41yo man w/a history of DM1, Hashimoto's thyroiditis, and ICM (s/p OHT 11/18/2014, CMV D+/R+, steroid induction, on maintenance tacro/MMF/pred; c/b early hemorrhagic tamponade requiring washout, delayed closure, and pericardial window and subsequent AF w/RVR, and CACHORRO; late course c/b ABMR 4/2015 s/p rituxan, PLEX, and IVIG; subsequent C.diff/CMV reactivation, restrictive cardiomyopathy with recurrent pleural effusions s/p VATS/pleurex catheter 1/2018 with removal 2/2018 and ascites requiring repeated LVP, and CKD) who was admitted on 3/11/2018 with ~3wks of worsening profuse non-bloody diarrhea, associated cramping abdominal pain, N/V, and a week of acute on chronic cough, SOB, and fevers and has been found to have multifocal pneumonia on CT chest. He decompensated today with acute hypoxic respiratory failure and is critically ill. Differential for his cough/SOB and diarrhea includes some unifying diagnoses (Legionella, disseminated histoplasmosis, disseminated CMV infection, Strongyloidiasis) but it is more likely that he has 2 processes driving his pulmonary and GI symptoms (such as a bacterial OI or IFI causing his multifocal pneumonia with accompanying GI illness such as CMV).     - will start empiric vanc/zosyn/azithromycin for broad bacterial coverage  - will start empiric posaconazole for  nodular appearing lesions in lungs (will cover invasive aspergillosis, offer some endemic fungal coverage, and zygomycetes empirically while we sort through etiology with additional testing)  - will start induction GCV dosed for CrCl -- would give neupogen if needed to support counts til invasive CMV is excluded  - have sent noninvasive workup for pulmonary issues including: aspergillus antigen, fungitell, urine histo/blasto antigens, crypto antigen, RVP, urine legionella, and Quantiferon  - have sent Strongyloides ab (of note, O&P negative), adenovirus EIA, and [place] stool PCR panel to complete noninvasive workup of diarrhea  - await pending blood, sputum, and stool cx and CMV quant/norovirus testing  - would pursue bronchoscopy when able -- transbronchial biopsy would be very helpful in acquiring a diagnosis more rapidly but will depend upon patients oxygenation status; please send aspergillus PCR and Legionella cx (to  serotypes not included in antigen testing) in addition to routine cx/path  - flex sig for samples to exclude CMV invasive disease or other pathogens would be helpful but patient is not stable enough for this procedure currently; will empirically treat CMV for now and reassess later            Thank you for your consult. I will follow-up with patient. Please contact us if you have any additional questions.     Maribell Eric MD  Transplant ID Attending  [id number]    Maribell Eric MD  Infectious Disease  [facility]    Subjective:     Principal Problem: Diarrhea    HPI: Mr. Crocker is a 41yo man w/a history of DM1, Hashimoto's thyroiditis, and ICM (s/p OHT 11/18/2014, CMV D+/R+, steroid induction, on maintenance tacro/MMF/pred; c/b early hemorrhagic tamponade requiring washout, delayed closure, and pericardial window and subsequent AF w/RVR, and CACHORRO; late course c/b ABMR 4/2015 s/p rituxan, PLEX, and IVIG; subsequent C.diff/CMV reactivation, restrictive cardiomyopathy  "with recurrent pleural effusions s/p VATS/pleurex catheter 1/2018 with removal 2/2018 and ascites requiring repeated LVP, and CKD) who was admitted on 3/11/2018 with ~3wks of worsening diarrhea and a week of acute on chronic cough/SOB. I first met patient on a prior admission in mid 2/2018 when he presented with similar symptoms of acute on chronic SOB, productive cough, and loose stools following recent pleurex catheter removal. He rapidly improved on broad spectrum antibiotics and was discharged on a 7 day course completed with moxifloxacin. Patient reports he felt back to baseline on antibiotics but developed worsening diarrhea again a few days after stopping them (loose, watery, nonbloody stools with associated cramping intermittent abdominal pain and nausea/emesis). Approximately 1wk ago, he developed a worsening non-productive cough, SOB, and subjective fevers along with worsening diarrhea (~10 episodes daily), prompting admission on 3/11. Workup to date is notable for multifocal dense infiltrates consistent with pneumonia on CT chest and negative non-invasive stool studies to date. He was scheduled to undergo lower endoscopy for his diarrhea to exclude CMV invasive disease but became acutely hypoxic preventing procedure and requiring ICU transfer for further monitoring. ID has been consulted to aid in workup and empiric treatment. He is currently not on antibiotics. Social/exposure history notable for: patient has lived whole life in small towns in [place] [facility] [place] [facility] and [facility]; no travel outside 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     Past Medical History:   Diagnosis Date    Arthritis     Ascites     Thought to be 2/2 heart tpx; liver bx 3/31/17 w/o significant fibrosis    Cardiomyopathy     Depression     Controlled "for the most part" on " Lexipro    Encounter for blood transfusion     during transplant    GERD (gastroesophageal reflux disease)     Hashimoto's disease     History of CHF (congestive heart failure)     Previously EF 20% (prior to heart transplant); last EF 60%, PAP 41 as of 12/12/17; throught to be 2/2 genetics & DM1    History of coronary artery disease     H/o Coronary artery disease; resolved since heart transplant 2014    History of myocardial infarction     h/o MI x3 prior to heart transplant    HLD (hyperlipidemia) 6/11/2015    Hypoglycemia unawareness in type 1 diabetes mellitus     Hypothyroid     Initially hyperthyroid 2/2 Hoshimoto's thyroiditis; now on levothyroxine 150 mcg qd    Pleural effusion due to another disorder     Thought to be 2/2 heart tpx; s/p PleuRx catheter placement and removal after 1-2 months    Pulmonary hypertension assoc with unclear multi-factorial mechanisms 12/12/2017    PAP 41 (EF 60%) on ECHO 12/12/17    Syncope 6/30/2015    Type I diabetes mellitus, well controlled     Well controlled; Dx'd @5y/o; on N insulin 20U BID & Humalog 10U w/meals +SSI; Glu 89 11/9/17; A1c 7.2% 4/5/17    Unspecified essential hypertension 05/29/2014    Well controlled; Last /57 on 11/9/17       Past Surgical History:   Procedure Laterality Date    CARDIAC CATHETERIZATION      CARDIAC SURGERY      CHOLECYSTECTOMY      CORONARY ANGIOPLASTY      FOOT SPLIT TRANSFER OF THE POSTERIOR TIBIALIS TENDON PROCEDURE      HEART TRANSPLANT  2014    KNEE SURGERY      PleuRx catheter placement  01/11/2018    Plan for removal after 1-2 months by Dr. James in cardiothoracic surgery    s/p oht  November 2014    stent placemnet         Review of patient's allergies indicates:   Allergen Reactions    No known drug allergies        Medications:  Prescriptions Prior to Admission   Medication Sig    aspirin (ECOTRIN) 81 MG EC tablet Take 1 tablet (81 mg total) by mouth once daily.    escitalopram oxalate  "(LEXAPRO) 20 MG tablet Take 1 tablet (20 mg total) by mouth once daily.    gabapentin (NEURONTIN) 300 MG capsule Take 3 capsules (900 mg total) by mouth 3 (three) times daily.    insulin aspart (NOVOLOG) 100 unit/mL InPn pen Inject 4 Units into the skin 3 (three) times daily.    insulin detemir (LEVEMIR FLEXTOUCH) 100 unit/mL (3 mL) SubQ InPn pen Inject 15 Units into the skin every evening. (Patient taking differently: Inject 15 Units into the skin 2 (two) times daily. )    lancets Misc 1 Device by Misc.(Non-Drug; Combo Route) route 4 (four) times daily with meals and nightly.    levothyroxine (SYNTHROID) 150 MCG tablet Take 1 tablet (150 mcg total) by mouth every morning.    magnesium oxide (MAG-OX) 400 mg tablet Take 1 tablet (400 mg total) by mouth once daily.    mycophenolate (MYFORTIC) 360 MG TbEC Take 2 tablets (720 mg total) by mouth 2 (two) times daily.    nortriptyline (PAMELOR) 25 MG capsule Take 1 capsule (25 mg total) by mouth every evening.    ondansetron (ZOFRAN-ODT) 4 MG TbDL Take 2 tablets (8 mg total) by mouth every 8 (eight) hours as needed (nausea).    pantoprazole (PROTONIX) 40 MG tablet TAKE ONE TABLET BY MOUTH EVERY DAY    pravastatin (PRAVACHOL) 40 MG tablet Take 1 tablet (40 mg total) by mouth every evening. (Patient taking differently: Take 40 mg by mouth once daily. )    predniSONE (DELTASONE) 2.5 MG tablet Take 1 tablet (2.5 mg total) by mouth once daily. S/P Heart Transplant 11/18/2014 ICD-10 Z94.1    tacrolimus (PROGRAF) 1 MG Cap Taked 3mg orally in the morning and 3mg orally in the evening. S/p heart transplant  11/18/2014  ICD-10 Z94.1    tamsulosin (FLOMAX) 0.4 mg Cp24 TAKE 2 CAPSULES(0.8 MG) BY MOUTH EVERY EVENING    torsemide (DEMADEX) 20 MG Tab Take 2 tablets (40 mg total) by mouth once daily.    pen needle, diabetic 32 gauge x 5/32" Ndle Uses 5 pen needles a day     Antibiotics     Start     Stop Route Frequency Ordered    03/13/18 1700  vancomycin (VANCOCIN) 1,250 " mg in sodium chloride 0.9% 250 mL IVPB      -- IV Once 03/13/18 1558    03/13/18 1700  piperacillin-tazobactam 4.5 g in sodium chloride 0.9% 100 mL IVPB (ready to mix system)      -- IV Every 8 hours (non-standard times) 03/13/18 1558    03/13/18 1645  azithromycin tablet 500 mg      -- Oral Daily 03/13/18 1638        Antifungals     Start     Stop Route Frequency Ordered    03/15/18 0900  posaconazole tablet 300 mg      -- Oral Daily 03/13/18 1638    03/13/18 1645  posaconazole tablet 300 mg      03/14 2059 Oral 2 times daily 03/13/18 1638        Antivirals         Stop Route Frequency     ganciclovir (CYTOVENE) injection      -- IV Every 24 hours (non-standard times)           Immunization History   Administered Date(s) Administered    Hepatitis A / Hepatitis B 12/07/2012, 02/28/2017, 08/24/2017, 10/09/2017    Influenza - High Dose 11/10/2015, 10/02/2017    Influenza - Quadrivalent - PF 12/07/2012    Influenza - Trivalent - PF (ADULT) 12/07/2012    Influenza A (H1N1) 2009 Monovalent - IM - PF 11/12/2009    Pneumococcal Conjugate - 13 Valent 10/02/2017    Pneumococcal Polysaccharide - 23 Valent 12/07/2012    Tdap 12/07/2012    Zoster 12/07/2012    influenza - Quadrivalent 11/18/2016       Family History     Problem Relation (Age of Onset)    Cancer Brother (50)    Diabetes Mother, Father, Brother, Son, Sister, Maternal Uncle, Paternal Aunt, Maternal Grandmother, Maternal Grandfather    Heart disease Mother, Brother    Hypertension Mother, Father, Brother    Kidney disease Mother, Father    Stroke Father, Brother    Vision loss Brother        Social History     Social History    Marital status:      Spouse name: N/A    Number of children: N/A    Years of education: N/A     Social History Main Topics    Smoking status: Never Smoker    Smokeless tobacco: Never Used    Alcohol use No    Drug use: No    Sexual activity: Yes     Partners: Female     Other Topics Concern    None     Social  History Narrative            Breakfast: Skips 80%; cereal; Diet Sprite    Lunch: Turkey or chicken sandwich on white bread; Diet Sprite    Dinner: Grilled burgers, small amount chips; Diet Sprite    Snacks: Ronny crackers before bed on most nights    Eats out: 2x/wk    Water: 0    PA: Walks 15 minutes (strenuous) daily    Goal weight 170-175 lbs by 1/2018     Review of Systems   Constitutional: Positive for activity change and chills. Negative for appetite change, diaphoresis and fever.   HENT: Positive for sinus pressure. Negative for ear pain, mouth sores and sore throat.    Eyes: Negative for photophobia, pain and redness.   Respiratory: Positive for cough and shortness of breath. Negative for wheezing.    Cardiovascular: Negative for chest pain and leg swelling.   Gastrointestinal: Positive for abdominal pain, diarrhea and nausea. Negative for abdominal distention.   Genitourinary: Negative for dysuria, flank pain, frequency and urgency.   Musculoskeletal: Positive for back pain. Negative for arthralgias, gait problem and myalgias.   Skin: Negative for pallor and rash.   Neurological: Positive for headaches. Negative for dizziness, tremors and seizures.   Psychiatric/Behavioral: Positive for confusion.     Objective:     Vital Signs (Most Recent):  Temp: 100.2 °F (37.9 °C) (03/13/18 1345)  Pulse: (!) 122 (03/13/18 1652)  Resp: (!) 26 (03/13/18 1652)  BP: (!) 106/55 (03/13/18 1345)  SpO2: 98 % (03/13/18 1652) Vital Signs (24h Range):  Temp:  [97.5 °F (36.4 °C)-100.8 °F (38.2 °C)] 100.2 °F (37.9 °C)  Pulse:  [] 122  Resp:  [16-26] 26  SpO2:  [88 %-98 %] 98 %  BP: ()/(54-69) 106/55     Weight: 78.6 kg (173 lb 4.5 oz)  Body mass index is 27.14 kg/m².    Estimated Creatinine Clearance: 26.7 mL/min (A) (based on SCr of 3.3 mg/dL (H)).    Physical Exam   Constitutional: He is oriented to person, place, and time. He appears well-developed and well-nourished. No distress.   Chronically ill appearing.    HENT:   Head: Atraumatic.   Mouth/Throat: Oropharynx is clear and moist. No oropharyngeal exudate.   Eyes: Conjunctivae and EOM are normal. Pupils are equal, round, and reactive to light. No scleral icterus.   Neck: Neck supple.   Cardiovascular: Normal rate and regular rhythm.  Exam reveals no friction rub.    No murmur heard.  Pulmonary/Chest: No respiratory distress. He has no wheezes. He has rales. He exhibits no tenderness.   Diminished in bases.   Abdominal: Soft. Bowel sounds are normal. He exhibits no distension. There is tenderness. There is no rebound and no guarding.   Musculoskeletal: Normal range of motion. He exhibits no edema.   Lymphadenopathy:     He has no cervical adenopathy.   Neurological: He is alert and oriented to person, place, and time. No cranial nerve deficit. He exhibits normal muscle tone. Coordination normal.   Skin: No rash noted. No erythema.       Significant Labs:   CBC:     Recent Labs  Lab 03/11/18  1922 03/12/18  0443 03/13/18  0441   WBC 3.30* 3.05* 2.92*   HGB 8.2* 7.7* 7.7*   HCT 25.1* 24.1* 23.8*    187 148*       Significant Imaging: I have reviewed all pertinent imaging results/findings within the past 24 hours.     CT chest:  Status post heart transplantation with new multifocal subsegmental consolidation throughout both lungs.  Similar findings were present on the CT dated 12/12/2017 with partial improvement on 02/14/2018 therefore we doubt malignancy.  Differential considerations include multifocal infectious pneumonia, hemorrhage, or aspiration.  We consider pulmonary edema due to abnormal capillary permeability or cardiac decompensation less likely.  Lymphoma could present similarly although felt less likely due to improvement on CT of 2/14/2018.    Continued moderate sized incompletely dependent right pleural effusion with rounded consolidation in the right lower lobe which probably represents rounded atelectasis, similar to prior, new from  7/19/2016.    Redemonstration of mild scattered ascites in the upper abdomen.    Microbiology:  3/11 stool WBC negative  3/11 stool cx: negative  3/11 O&P negative  3/11 C.diff negative  3/11 Giardia antigen negative  3/11 Cryptosporidium antigen negative  3/11 rotavirus negative  3/11 norovirus pending  3/12 CMV quant pending  3/13 blood cx: pending   3/13 sputum cx: pending  3/13 aspergillus antigen pending  3/13 fungitell pending  3/13 urine histo pending  3/13 urine blasto pending  3/13 crypto antigen pending  3/13 urine legionella pending  3/13 RVP pending  3/13 adenovirus stool EIA pending  3/13 Strongyloides ab pending  3/13 [place] stool pathogens panel pending  3/14 quantiferon pending

## 2018-03-13 NOTE — CONSULTS
[facility]  Otorhinolaryngology-Head & Neck Surgery  History & Physical    Patient Name: Lv Crocker  MRN: [id number]  Admission Date: 3/11/2018  Attending Physician: Jose A Lloyd MD   Primary Care Provider: Philippe Mohr MD    Patient information was obtained from patient.     Subjective:     Chief Complaint/Reason for Admission: Cough    History of Present Illness: Lv Crocker is a 42 y.o. male with a PMH of heart failure s/p heart transplant (complicated by suspected post operative restrictive vs constrictive cardiomyopathy), T1DM, HTN, hypothyroidism, and CKD. Secondary to his cardiomyopathy and CKD he has suffered from chronic 3 spacing resulting in repeat ascites an pleural effusions. S/p multiple paracentesis and thoracentesis, s/p VATS pleurX catheter placement on 1/11/2018 and removal 2/7/18.   The patient is currently admitted with unrelenting diarrhea and a chronic cough. He is scheduled to undergo a upper and lower GI assessment tomorrow. Otolaryngology consulted for evaluation of the cough. He states that the cough first started last year around December, it has stabilized until the last week or so when it acutely resumed. He describes this as a wet cough, productive for clear phlegm. Denies blood. Denies SOB, chest pain, globus sensation, choking sensation, dysphagia or odynophagia.     Medications:  Continuous Infusions:   sodium chloride 0.9% 100 mL/hr at 03/12/18 1803     Scheduled Meds:   aspirin  81 mg Oral Daily    enoxaparin  30 mg Subcutaneous Daily    escitalopram oxalate  20 mg Oral Daily    gabapentin  900 mg Oral TID    insulin detemir U-100  5 Units Subcutaneous BID    [START ON 3/13/2018] levothyroxine  125 mcg Oral QAM    mycophenolate  720 mg Oral BID    nortriptyline  25 mg Oral QHS    pantoprazole  40 mg Oral Daily    pravastatin  40 mg Oral Daily    predniSONE  2.5 mg Oral Daily    sodium chloride 0.9%  3 mL Intravenous Q8H     tacrolimus  3 mg Oral BID    tamsulosin  0.4 mg Oral QHS     PRN Meds:benzonatate, dextrose 50%, dextrose 50%, glucagon (human recombinant), glucose, glucose, insulin aspart U-100, ondansetron, promethazine-codeine 6.25-10 mg/5 ml     No current facility-administered medications on file prior to encounter.      Current Outpatient Prescriptions on File Prior to Encounter   Medication Sig    aspirin (ECOTRIN) 81 MG EC tablet Take 1 tablet (81 mg total) by mouth once daily.    escitalopram oxalate (LEXAPRO) 20 MG tablet Take 1 tablet (20 mg total) by mouth once daily.    gabapentin (NEURONTIN) 300 MG capsule Take 3 capsules (900 mg total) by mouth 3 (three) times daily.    insulin aspart (NOVOLOG) 100 unit/mL InPn pen Inject 4 Units into the skin 3 (three) times daily.    insulin detemir (LEVEMIR FLEXTOUCH) 100 unit/mL (3 mL) SubQ InPn pen Inject 15 Units into the skin every evening. (Patient taking differently: Inject 15 Units into the skin 2 (two) times daily. )    lancets Misc 1 Device by Misc.(Non-Drug; Combo Route) route 4 (four) times daily with meals and nightly.    levothyroxine (SYNTHROID) 150 MCG tablet Take 1 tablet (150 mcg total) by mouth every morning.    magnesium oxide (MAG-OX) 400 mg tablet Take 1 tablet (400 mg total) by mouth once daily.    mycophenolate (MYFORTIC) 360 MG TbEC Take 2 tablets (720 mg total) by mouth 2 (two) times daily.    nortriptyline (PAMELOR) 25 MG capsule Take 1 capsule (25 mg total) by mouth every evening.    ondansetron (ZOFRAN-ODT) 4 MG TbDL Take 2 tablets (8 mg total) by mouth every 8 (eight) hours as needed (nausea).    pantoprazole (PROTONIX) 40 MG tablet TAKE ONE TABLET BY MOUTH EVERY DAY    pravastatin (PRAVACHOL) 40 MG tablet Take 1 tablet (40 mg total) by mouth every evening. (Patient taking differently: Take 40 mg by mouth once daily. )    predniSONE (DELTASONE) 2.5 MG tablet Take 1 tablet (2.5 mg total) by mouth once daily. S/P Heart Transplant  "11/18/2014 ICD-10 Z94.1    tacrolimus (PROGRAF) 1 MG Cap Taked 3mg orally in the morning and 3mg orally in the evening. S/p heart transplant  11/18/2014  ICD-10 Z94.1    tamsulosin (FLOMAX) 0.4 mg Cp24 TAKE 2 CAPSULES(0.8 MG) BY MOUTH EVERY EVENING    torsemide (DEMADEX) 20 MG Tab Take 2 tablets (40 mg total) by mouth once daily.    pen needle, diabetic 32 gauge x 5/32" Ndle Uses 5 pen needles a day       Review of patient's allergies indicates:   Allergen Reactions    No known drug allergies        Past Medical History:   Diagnosis Date    Arthritis     Ascites     Thought to be 2/2 heart tpx; liver bx 3/31/17 w/o significant fibrosis    Cardiomyopathy     Depression     Controlled "for the most part" on Lexipro    Encounter for blood transfusion     during transplant    GERD (gastroesophageal reflux disease)     Hashimoto's disease     History of CHF (congestive heart failure)     Previously EF 20% (prior to heart transplant); last EF 60%, PAP 41 as of 12/12/17; throught to be 2/2 genetics & DM1    History of coronary artery disease     H/o Coronary artery disease; resolved since heart transplant 2014    History of myocardial infarction     h/o MI x3 prior to heart transplant    HLD (hyperlipidemia) 6/11/2015    Hypoglycemia unawareness in type 1 diabetes mellitus     Hypothyroid     Initially hyperthyroid 2/2 Hoshimoto's thyroiditis; now on levothyroxine 150 mcg qd    Pleural effusion due to another disorder     Thought to be 2/2 heart tpx; s/p PleuRx catheter placement and removal after 1-2 months    Pulmonary hypertension assoc with unclear multi-factorial mechanisms 12/12/2017    PAP 41 (EF 60%) on ECHO 12/12/17    Syncope 6/30/2015    Type I diabetes mellitus, well controlled     Well controlled; Dx'd @7y/o; on N insulin 20U BID & Humalog 10U w/meals +SSI; Glu 89 11/9/17; A1c 7.2% 4/5/17    Unspecified essential hypertension 05/29/2014    Well controlled; Last /57 on 11/9/17 "     Past Surgical History:   Procedure Laterality Date    CARDIAC CATHETERIZATION      CARDIAC SURGERY      CHOLECYSTECTOMY      CORONARY ANGIOPLASTY      FOOT SPLIT TRANSFER OF THE POSTERIOR TIBIALIS TENDON PROCEDURE      HEART TRANSPLANT  2014    KNEE SURGERY      PleuRx catheter placement  01/11/2018    Plan for removal after 1-2 months by Dr. James in cardiothoracic surgery    s/p oht  November 2014    stent placemnet       Family History     Problem Relation (Age of Onset)    Cancer Brother (50)    Diabetes Mother, Father, Brother, Son, Sister, Maternal Uncle, Paternal Aunt, Maternal Grandmother, Maternal Grandfather    Heart disease Mother, Brother    Hypertension Mother, Father, Brother    Kidney disease Mother, Father    Stroke Father, Brother    Vision loss Brother        Social History Main Topics    Smoking status: Never Smoker    Smokeless tobacco: Never Used    Alcohol use No    Drug use: No    Sexual activity: Yes     Partners: Female     Review of Systems  Objective:     Vital Signs (Most Recent):  Temp: 98.5 °F (36.9 °C) (03/12/18 1515)  Pulse: 105 (03/12/18 1548)  Resp: 18 (03/12/18 1515)  BP: (!) 100/52 (03/12/18 1515)  SpO2: (!) 93 % (03/12/18 1515) Vital Signs (24h Range):  Temp:  [98.5 °F (36.9 °C)-99.4 °F (37.4 °C)] 98.5 °F (36.9 °C)  Pulse:  [105-119] 105  Resp:  [13-23] 18  SpO2:  [93 %-96 %] 93 %  BP: ()/(45-59) 100/52     Weight: 78.9 kg (174 lb)  Body mass index is 27.25 kg/m².      Date 03/12/18 0700 - 03/13/18 0659   Shift 2706-5736 7654-3993 0914-9371 24 Hour Total   I  N  T  A  K  E   P.O. 640 280  920    I.V.  (mL/kg) 205  (2.6) 300  (3.8)  505  (6.4)    Other 0   0    Shift Total  (mL/kg) 845  (10.7) 580  (7.3)  1425  (18.1)   O  U  T  P  U  T   Urine  (mL/kg/hr) 150  (0.2) 0  150    Shift Total  (mL/kg) 150  (1.9) 0  (0)  150  (1.9)   Weight (kg) 78.9 78.9 78.9 78.9       Physical Exam  General: Alert, well developed, comfortable  Voice: Regular for age,  good volume  Respiratory: Occasional wet cough. Symmetric breathing, no stridor, no distress  Head: Normocephalic, no lesions  Face: Symmetric, HB 1/6 bilat, no lesions, no obvious sinus tenderness, salivary glands nontender  Eyes: Sclera white, extraocular movements intact  Nose: Bilateral floor septal spurs, dorsum straight, normal turbinate size, normal mucosa  Right Ear: Pinna and external ear appears normal  Left Ear: Pinna and external ear appears normal  Hearing: Grossly intact  Oral cavity/OP: Healthy mucosa, no masses or lesions including lips, gums, floor of mouth, palate, or tongue, normal pharyngeal wall movement  Neck: Supple, no palpable nodes, no masses, trachea midline, no thyroid masses  Cardiovascular system: Pulses regular in both upper extremities, good skin turgor      Nasal/Nasopharyngo/Laryn/Hypopharyngoscopy Procedures    Procedure:  Diagnostic nasal, nasopharyngoscopy, laryngoscopy and hypopharyngoscopy. With customary flexible endoscope.     NOSE:   External:  No gross deformity   Intranasal:    Mucosa:  No polyps, ulcers or lesions.    Septum:  Bilateral floor septal spurs    Turbinates:  Not enlarged.    Nasopharynx:  No lesions.   Mucosa:  No lesions.   Adenoids:  Present.   Posterior Choanae:  Patent.   Eustachian Tubes:  Patent.  Larynx/hypopharynx:   Epiglottis:  No lesions, without edema.   AE Folds:  No lesions.   Vocal cords:  No polyps; nl mobility.    Subglottis: No obvious stenosis   Hypopharynx:  No lesions.   Piriform sinus:  No pooling or lesions.   Post Cricoid:  No edema or erythema          Significant Labs:  BMP:   Recent Labs  Lab 03/12/18  0443   *      CO2 20*   BUN 35*   CREATININE 3.1*   CALCIUM 9.3   MG 1.4*     CBC:   Recent Labs  Lab 03/12/18  0443   WBC 3.05*   RBC 2.62*   HGB 7.7*   HCT 24.1*      MCV 92   MCH 29.4   MCHC 32.0       Significant Diagnostics:  X-Ray: I have reviewed all pertinent results/findings within the past 24 hours and my  personal findings are:  Lung field opacification. 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 Chronic third spacing with pleural effusions. Admitted for workup of diarrhea and cough. FFL largely within normal limits. No gross lesions or abnormalities identified within upper aerodigestive tract. Cough is wet, and productive, active effusion on CXR.     -No gross abnormality identified within upper aerodigestive tract   No laryngeal lesions/masses/etc  -Suspect cough likely 2/2 lower airway process (wet cough in setting of pleural effusion)   May benefit from Pulmonology evaluation, defer to judgement of primary  -Agree with antireflux medications, optimize as best reasonable  -May benefit from humidified air  -Cough suppressant/lozenge as tolerable.   -Discuss with staff          VTE Risk Mitigation         Ordered     enoxaparin injection 30 mg  Daily     Route:  Subcutaneous        03/12/18 0023          Master Lundberg MD  Otorhinolaryngology-Head & Neck Surgery  [facility]

## 2018-03-13 NOTE — PROGRESS NOTES
While in the endoscopy pre-procedure area, the patient began developing increasing SOB and inability to lie flat.  In addition, his O2 requirement was 4-5 L/min to maintain a SPO2 in the low 90s and eventually was also dipping into the high 80s.  He also had a new fever of 38.2.  I do not believe he will safely tolerate the procedure with anesthesia today in his current condition and we will post-pone.  Recommend further w/u for his respiratory failure.  Consider CT surgery consult.    GREGORIO Dee MD  Staff Anesthesiologist

## 2018-03-13 NOTE — PHYSICIAN QUERY
PT Name: Lv Crocker  MR #: [id number]     Physician Query Form - Etiology of Condition Clarification      CDS/: Dhara Oviedo               Contact information: [email or web address]   This form is a permanent document in the medical record.     Query Date: March 13, 2018    By submitting this query, we are merely seeking further clarification of documentation.  Please utilize your independent clinical judgment when addressing the question(s) below.     The medical record contains the following:    Findings Supporting Clinical Information Location in Medical Record   Pleural effusion      Patient was admitted for suspected bacterial infection, pneumonia or bronchitis, and diarrhea.  Pt was started on IV Antibiotic initially which were deescalated to oral antibiotics.      Work up did not reveal bacterial infection, with white count and pro-calcitonin being normal    Pleural effusion on the right side was tapped which yielded clear fluid AND sample was sent for further work up.    Negative for malignant cells DC Summary 2/14    DC Summary 2/14          DC Summary 2/14      DC Summary 2/14      Cytology Specimen 2/14     Please document your best medical opinion regarding the etiology of _pleural effusion ___ for which the primary focus of treatment is/was directed.                         [  x  ]  Clinically Undetermined    Please document in your progress notes daily for the duration of treatment, until resolved, and include in your discharge summary.

## 2018-03-13 NOTE — PROGRESS NOTES
Pt to Endo via stretcher. Transported with TELE, 4LNC, and chart. In no apparent distress upon departure.

## 2018-03-13 NOTE — HPI
41yo man w/a history of DM1, Hashimoto's thyroiditis, and ICM (s/p OHT 11/18/2014, CMV D+/R+, steroid induction, on maintenance tacro/MMF/pred; c/b early hemorrhagic tamponade requiring washout, delayed closure, and pericardial window and subsequent AF w/RVR, and CACHORRO; late course c/b ABMR 4/2015 s/p rituxan, PLEX, and IVIG; subsequent C.diff/CMV reactivation, restrictive cardiomyopathy with recurrent pleural effusions s/p VATS/pleurex catheter 1/2018 with removal 2/2018 and ascites requiring repeated LVP, and CKD) who was admitted on 3/11/2018 with ~3wks of worsening profuse non-bloody diarrhea, associated cramping abdominal pain, N/V, and a week of acute on chronic cough, SOB, hypoxia requiring intubation, and fevers and was found to have multifocal pneumonia on CT chest due to RSV-A pneumonia with suspected superimposed bacterial pneumonia. He remains critically ill with ongoing pressor requirement, hypoxic RF, CRRT requirement, and minimal hepatitis.

## 2018-03-13 NOTE — ASSESSMENT & PLAN NOTE
43yo man w/a history of DM1, Hashimoto's thyroiditis, and ICM (s/p OHT 11/18/2014, CMV D+/R+, steroid induction, on maintenance tacro/MMF/pred; c/b early hemorrhagic tamponade requiring washout, delayed closure, and pericardial window and subsequent AF w/RVR, and CACHORRO; late course c/b ABMR 4/2015 s/p rituxan, PLEX, and IVIG; subsequent C.diff/CMV reactivation, restrictive cardiomyopathy with recurrent pleural effusions s/p VATS/pleurex catheter 1/2018 with removal 2/2018 and ascites requiring repeated LVP, and CKD) who was admitted on 3/11/2018 with ~3wks of worsening profuse non-bloody diarrhea, associated cramping abdominal pain, N/V, and a week of acute on chronic cough, SOB, and fevers and has been found to have multifocal pneumonia on CT chest. He decompensated today with acute hypoxic respiratory failure and is critically ill. Differential for his cough/SOB and diarrhea includes some unifying diagnoses (Legionella, disseminated histoplasmosis, disseminated CMV infection, Strongyloidiasis) but it is more likely that he has 2 processes driving his pulmonary and GI symptoms (such as a bacterial OI or IFI causing his multifocal pneumonia with accompanying GI illness such as CMV).     - will start empiric vanc/zosyn/azithromycin for broad bacterial coverage  - will start empiric posaconazole for nodular appearing lesions in lungs (will cover invasive aspergillosis, offer some endemic fungal coverage, and zygomycetes empirically while we sort through etiology with additional testing)  - will start induction GCV dosed for CrCl -- would give neupogen if needed to support counts til invasive CMV is excluded  - have sent noninvasive workup for pulmonary issues including: aspergillus antigen, fungitell, urine histo/blasto antigens, crypto antigen, RVP, urine legionella, and Quantiferon  - have sent Strongyloides ab (of note, O&P negative), adenovirus EIA, and [place] stool PCR panel to complete noninvasive workup of  diarrhea  - await pending blood, sputum, and stool cx and CMV quant/norovirus testing  - would pursue bronchoscopy when able -- transbronchial biopsy would be very helpful in acquiring a diagnosis more rapidly but will depend upon patients oxygenation status; please send aspergillus PCR and Legionella cx (to  serotypes not included in antigen testing) in addition to routine cx/path  - flex sig for samples to exclude CMV invasive disease or other pathogens would be helpful but patient is not stable enough for this procedure currently; will empirically treat CMV for now and reassess later

## 2018-03-13 NOTE — SUBJECTIVE & OBJECTIVE
"Past Medical History:   Diagnosis Date    Arthritis     Ascites     Thought to be 2/2 heart tpx; liver bx 3/31/17 w/o significant fibrosis    Cardiomyopathy     Depression     Controlled "for the most part" on Lexipro    Encounter for blood transfusion     during transplant    GERD (gastroesophageal reflux disease)     Hashimoto's disease     History of CHF (congestive heart failure)     Previously EF 20% (prior to heart transplant); last EF 60%, PAP 41 as of 12/12/17; throught to be 2/2 genetics & DM1    History of coronary artery disease     H/o Coronary artery disease; resolved since heart transplant 2014    History of myocardial infarction     h/o MI x3 prior to heart transplant    HLD (hyperlipidemia) 6/11/2015    Hypoglycemia unawareness in type 1 diabetes mellitus     Hypothyroid     Initially hyperthyroid 2/2 Hoshimoto's thyroiditis; now on levothyroxine 150 mcg qd    Pleural effusion due to another disorder     Thought to be 2/2 heart tpx; s/p PleuRx catheter placement and removal after 1-2 months    Pulmonary hypertension assoc with unclear multi-factorial mechanisms 12/12/2017    PAP 41 (EF 60%) on ECHO 12/12/17    Syncope 6/30/2015    Type I diabetes mellitus, well controlled     Well controlled; Dx'd @5y/o; on N insulin 20U BID & Humalog 10U w/meals +SSI; Glu 89 11/9/17; A1c 7.2% 4/5/17    Unspecified essential hypertension 05/29/2014    Well controlled; Last /57 on 11/9/17       Past Surgical History:   Procedure Laterality Date    CARDIAC CATHETERIZATION      CARDIAC SURGERY      CHOLECYSTECTOMY      CORONARY ANGIOPLASTY      FOOT SPLIT TRANSFER OF THE POSTERIOR TIBIALIS TENDON PROCEDURE      HEART TRANSPLANT  2014    KNEE SURGERY      PleuRx catheter placement  01/11/2018    Plan for removal after 1-2 months by Dr. James in cardiothoracic surgery    s/p oht  November 2014    stent placemnet         Review of patient's allergies indicates:   Allergen Reactions    " No known drug allergies        Family History     Problem Relation (Age of Onset)    Cancer Brother (50)    Diabetes Mother, Father, Brother, Son, Sister, Maternal Uncle, Paternal Aunt, Maternal Grandmother, Maternal Grandfather    Heart disease Mother, Brother    Hypertension Mother, Father, Brother    Kidney disease Mother, Father    Stroke Father, Brother    Vision loss Brother        Social History Main Topics    Smoking status: Never Smoker    Smokeless tobacco: Never Used    Alcohol use No    Drug use: No    Sexual activity: Yes     Partners: Female         Review of Systems   Constitutional: Positive for activity change, appetite change, fatigue and fever.   Respiratory: Positive for cough and shortness of breath.    Cardiovascular: Positive for chest pain. Negative for leg swelling.   Gastrointestinal: Positive for diarrhea and vomiting.   Skin: Positive for pallor.   Allergic/Immunologic: Positive for immunocompromised state.     Objective:     Vital Signs (Most Recent):  Temp: 100.2 °F (37.9 °C) (03/13/18 1345)  Pulse: (!) 122 (03/13/18 1345)  Resp: 18 (03/13/18 1345)  BP: (!) 106/55 (03/13/18 1345)  SpO2: (!) 93 % (03/13/18 1345) Vital Signs (24h Range):  Temp:  [97.5 °F (36.4 °C)-100.8 °F (38.2 °C)] 100.2 °F (37.9 °C)  Pulse:  [] 122  Resp:  [16-20] 18  SpO2:  [88 %-93 %] 93 %  BP: ()/(54-69) 106/55     Weight: 78.6 kg (173 lb 4.5 oz)  Body mass index is 27.14 kg/m².      Intake/Output Summary (Last 24 hours) at 03/13/18 1547  Last data filed at 03/13/18 1000   Gross per 24 hour   Intake          1543.75 ml   Output              350 ml   Net          1193.75 ml       Physical Exam   Constitutional: He is oriented to person, place, and time. He appears well-developed and well-nourished. He appears ill. He appears distressed.   HENT:   Head: Normocephalic and atraumatic.   Eyes: EOM are normal.   Neck: Neck supple. JVD present.   Cardiovascular: Intact distal pulses.  Exam reveals friction  rub.    No murmur heard.  Tachycardia    Pulmonary/Chest: He is in respiratory distress.   On 5 L nc.   Decreased breath sounds bilaterally up to the upper lungs. With fine crackles at the upper lungs.    Abdominal: Soft.   Musculoskeletal: He exhibits no edema.   Neurological: He is alert and oriented to person, place, and time.   Skin: Skin is warm and dry. He is not diaphoretic. There is pallor.   Psychiatric: He has a normal mood and affect.   Vitals reviewed.        Lines/Drains/Airways     Peripheral Intravenous Line                 Peripheral IV - Single Lumen 03/11/18 1931 Left Hand 1 day                Significant Labs:    CBC/Anemia Profile:    Recent Labs  Lab 03/11/18  1922 03/11/18 1923 03/12/18 0443 03/13/18 0441   WBC 3.30*  --  3.05* 2.92*   HGB 8.2*  --  7.7* 7.7*   HCT 25.1*  --  24.1* 23.8*     --  187 148*   MCV 90  --  92 92   RDW 13.2  --  13.2 13.2   OCCULTBLOOD  --  Negative  --   --         Chemistries:    Recent Labs  Lab 03/11/18 1922 03/12/18 0443 03/13/18 0442     136 136 137   K 4.6  4.6 4.9 5.0     101 103 103   CO2 21*  21* 20* 19*   BUN 35*  35* 35* 35*   CREATININE 3.4*  3.4* 3.1* 3.3*   CALCIUM 9.8  9.8 9.3 8.7   ALBUMIN 3.2* 2.9* 2.9*   PROT 6.9 6.2 6.2   BILITOT 1.3* 1.2* 0.9   ALKPHOS 317* 298* 303*   ALT 9* 8* 9*   AST 18 17 18   MG  --  1.4* 1.3*       Significant Imaging:   CT: I have reviewed all pertinent results/findings within the past 24 hours and my personal findings are:  right lung with paryncemal changes. left lung with new opacities concerning for infection.

## 2018-03-13 NOTE — NURSING TRANSFER
Nursing Transfer Note      3/13/2018     Transfer to [id number]    Transfer via stretcher    Transfer with chart, O2    Transported by Charge RN    Medicines sent: Insulin pens    Chart send with patient: YES    Notified: wife with patient    Patient reassessed at: 3/13/18 @ 9009

## 2018-03-13 NOTE — PLAN OF CARE
Problem: Patient Care Overview  Goal: Plan of Care Review  Pt aaox4 vswnl and bed in low position and callbell within reach. Wife at bedside. Pt drinking golytely and states had 2 bms but flushed toilet. Pt needs stool sample. Reminded pt and he has a hat on toilet to collect the stool. Pt npo after mn for egd and colonoscopy in am. Ns@ 100cc/hr infusing. accu ck at 9433=006. abd u/s done on dayshift shows ascities. Telemetry maintained nst.  Echo done on dayshift with EF 50%. c diff negative. cmv pending. Pt ambulates with standby assist.

## 2018-03-13 NOTE — HPI
42 year old male with Hashimoto thyroditis, DM-1 on insulin, Heart transplant (2014) on chronic immunosuppression, complicated with constrictive/restrictive cardiomyopathy, recurrent ascitis and pleural effusions. Who presented on 3/11 with worsening diarrhea, and cough for 3 weeks.   He reports associated right sided chest pain that is worse with breathing. Coughing fits that ends by passing out or voimiting. He is having 5-10 episodes of non bloody diarrhea daily.     In December/17 he was admitted for bilateral CAP and was treated initially with ceftriaxone and azithromycin then it was switched to Levaquin. On 1/11/18 he had pleurex catheter placed that was removed on 2/7. On 1/19/18 he underwent VATS with pleurodesis on the right side. He was recently admitted on 2/11-14 with similar sx of coughing fits and diarrhea. IR performed a right thoracentesis which was unremarkable and all his bacterial and viral cultures were negative.

## 2018-03-13 NOTE — HPI
Lv Crocker is a 42 y.o. male with a PMH of heart failure s/p heart transplant (complicated by suspected post operative restrictive vs constrictive cardiomyopathy), T1DM, HTN, hypothyroidism, and CKD. Secondary to his cardiomyopathy and CKD he has suffered from chronic 3 spacing resulting in repeat ascites an pleural effusions. S/p multiple paracentesis and thoracentesis, s/p VATS pleurX catheter placement on 1/11/2018 and removal 2/7/18.   The patient is currently admitted with unrelenting diarrhea and a chronic cough. He is scheduled to undergo a upper and lower GI assessment tomorrow. Otolaryngology consulted for evaluation of the cough. He states that the cough first started last year around December, it has stabilized until the last week or so when it acutely resumed. He describes this as a wet cough, productive for clear phlegm. Denies blood. Denies SOB, chest pain, globus sensation, choking sensation, dysphagia or odynophagia.

## 2018-03-13 NOTE — PROGRESS NOTES
Seen for pressure injury prevention measures  Pt is complaining of pain , sensitivity and tenderness to sacral area . Has a history of a previous pressure injury post op his transplant.   Presently, he is free from sacral pressure injury. No signs of redness or skin breaks in the sacral region.   Will begin prevention measures :  1. Low air loss mattress overlay  2. Chair cushion  3. Aquacel foam to sacral area twice a week    Dc Garcia RN CWON  [id number]

## 2018-03-13 NOTE — H&P
"[facility]  History & Physical    Subjective:      Chief Complaint/Reason for Admission:     EGD and colonoscopy    Lv Crocker is a 42 y.o. male.    Past Medical History:   Diagnosis Date    Arthritis     Ascites     Thought to be 2/2 heart tpx; liver bx 3/31/17 w/o significant fibrosis    Cardiomyopathy     Depression     Controlled "for the most part" on Lexipro    Encounter for blood transfusion     during transplant    GERD (gastroesophageal reflux disease)     Hashimoto's disease     History of CHF (congestive heart failure)     Previously EF 20% (prior to heart transplant); last EF 60%, PAP 41 as of 12/12/17; throught to be 2/2 genetics & DM1    History of coronary artery disease     H/o Coronary artery disease; resolved since heart transplant 2014    History of myocardial infarction     h/o MI x3 prior to heart transplant    HLD (hyperlipidemia) 6/11/2015    Hypoglycemia unawareness in type 1 diabetes mellitus     Hypothyroid     Initially hyperthyroid 2/2 Hoshimoto's thyroiditis; now on levothyroxine 150 mcg qd    Pleural effusion due to another disorder     Thought to be 2/2 heart tpx; s/p PleuRx catheter placement and removal after 1-2 months    Pulmonary hypertension assoc with unclear multi-factorial mechanisms 12/12/2017    PAP 41 (EF 60%) on ECHO 12/12/17    Syncope 6/30/2015    Type I diabetes mellitus, well controlled     Well controlled; Dx'd @7y/o; on N insulin 20U BID & Humalog 10U w/meals +SSI; Glu 89 11/9/17; A1c 7.2% 4/5/17    Unspecified essential hypertension 05/29/2014    Well controlled; Last /57 on 11/9/17     Past Surgical History:   Procedure Laterality Date    CARDIAC CATHETERIZATION      CARDIAC SURGERY      CHOLECYSTECTOMY      CORONARY ANGIOPLASTY      FOOT SPLIT TRANSFER OF THE POSTERIOR TIBIALIS TENDON PROCEDURE      HEART TRANSPLANT  2014    KNEE SURGERY      PleuRx catheter placement  01/11/2018    Plan for removal " after 1-2 months by Dr. James in cardiothoracic surgery    s/p oht  November 2014    stent placemnet       Family History   Problem Relation Age of Onset    Heart disease Mother     Kidney disease Mother     Diabetes Mother     Hypertension Mother     Kidney disease Father     Diabetes Father     Hypertension Father     Stroke Father     Heart disease Brother     Stroke Brother     Diabetes Brother     Cancer Brother 50     pancreatic    Vision loss Brother     Hypertension Brother     Diabetes Son     Diabetes Sister     Diabetes Maternal Uncle     Diabetes Paternal Aunt     Diabetes Maternal Grandmother     Diabetes Maternal Grandfather      Social History   Substance Use Topics    Smoking status: Never Smoker    Smokeless tobacco: Never Used    Alcohol use No       PTA Medications   Medication Sig    aspirin (ECOTRIN) 81 MG EC tablet Take 1 tablet (81 mg total) by mouth once daily.    escitalopram oxalate (LEXAPRO) 20 MG tablet Take 1 tablet (20 mg total) by mouth once daily.    gabapentin (NEURONTIN) 300 MG capsule Take 3 capsules (900 mg total) by mouth 3 (three) times daily.    insulin aspart (NOVOLOG) 100 unit/mL InPn pen Inject 4 Units into the skin 3 (three) times daily.    insulin detemir (LEVEMIR FLEXTOUCH) 100 unit/mL (3 mL) SubQ InPn pen Inject 15 Units into the skin every evening. (Patient taking differently: Inject 15 Units into the skin 2 (two) times daily. )    lancets Misc 1 Device by Misc.(Non-Drug; Combo Route) route 4 (four) times daily with meals and nightly.    levothyroxine (SYNTHROID) 150 MCG tablet Take 1 tablet (150 mcg total) by mouth every morning.    magnesium oxide (MAG-OX) 400 mg tablet Take 1 tablet (400 mg total) by mouth once daily.    mycophenolate (MYFORTIC) 360 MG TbEC Take 2 tablets (720 mg total) by mouth 2 (two) times daily.    nortriptyline (PAMELOR) 25 MG capsule Take 1 capsule (25 mg total) by mouth every evening.    ondansetron  "(ZOFRAN-ODT) 4 MG TbDL Take 2 tablets (8 mg total) by mouth every 8 (eight) hours as needed (nausea).    pantoprazole (PROTONIX) 40 MG tablet TAKE ONE TABLET BY MOUTH EVERY DAY    pravastatin (PRAVACHOL) 40 MG tablet Take 1 tablet (40 mg total) by mouth every evening. (Patient taking differently: Take 40 mg by mouth once daily. )    predniSONE (DELTASONE) 2.5 MG tablet Take 1 tablet (2.5 mg total) by mouth once daily. S/P Heart Transplant 11/18/2014 ICD-10 Z94.1    tacrolimus (PROGRAF) 1 MG Cap Taked 3mg orally in the morning and 3mg orally in the evening. S/p heart transplant  11/18/2014  ICD-10 Z94.1    tamsulosin (FLOMAX) 0.4 mg Cp24 TAKE 2 CAPSULES(0.8 MG) BY MOUTH EVERY EVENING    torsemide (DEMADEX) 20 MG Tab Take 2 tablets (40 mg total) by mouth once daily.    pen needle, diabetic 32 gauge x 5/32" Ndle Uses 5 pen needles a day     Review of patient's allergies indicates:   Allergen Reactions    No known drug allergies         Review of Systems   Constitutional: Positive for fever and malaise/fatigue. Negative for chills and weight loss.   Respiratory: Positive for shortness of breath.    Cardiovascular: Negative for chest pain.   Gastrointestinal: Positive for diarrhea.   Neurological: Positive for weakness.       Objective:      Vital Signs (Most Recent)  Temp: (!) 100.8 °F (38.2 °C) (03/13/18 1258)  Pulse: (!) 132 (03/13/18 1258)  Resp: 20 (03/13/18 1258)  BP: 117/69 (03/13/18 1258)  SpO2: (!) 88 % (03/13/18 1258)    Vital Signs Range (Last 24H):  Temp:  [97.5 °F (36.4 °C)-100.8 °F (38.2 °C)]   Pulse:  []   Resp:  [16-20]   BP: ()/(52-69)   SpO2:  [88 %-94 %]     Physical Exam   Constitutional: He appears well-developed and well-nourished.   Cardiovascular: Normal rate.    Pulmonary/Chest: Effort normal.   Tachypnea and with decrease breath sound on right.   Abdominal: Soft. Bowel sounds are normal.   Skin: Skin is warm.   Psychiatric: He has a normal mood and affect. His behavior is " normal. Judgment and thought content normal.         Assessment:      Active Hospital Problems    Diagnosis  POA    *Diarrhea [R19.7]  Yes    Potential impairment of skin integrity [Z91.89]  Not Applicable    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]  Yes    Leukopenia [D72.819]  Yes    Cough [R05]  Yes    Cough [R05]  Yes    Iron deficiency anemia [D50.9]  Yes     IMO LOAD Q4      Type 1 diabetes mellitus with stage 3 chronic kidney disease [E10.22, N18.3]  Yes    Hypothyroidism due to Hashimoto's thyroiditis [E03.8, E06.3]  Yes    CKD (chronic kidney disease), stage IV [N18.4]  Yes    Heart transplanted [Z94.1]  Not Applicable    Current use of steroid medication [Z79.52]  Not Applicable     Chronic      Resolved Hospital Problems    Diagnosis Date Resolved POA   No resolved problems to display.       Plan:    Patient for EGD and colonoscopy for diarrhea patient s/p recent transplant  Patient with fever and right pleural effusion and infiltrates on CT scan and sating 89% on 5 liters NC and apprehensive about sedation right not and so is anesthesia staff and case discuss with primary team and will hold off EGD and colonoscopy with sedation currently until patient respiratory status improves.

## 2018-03-13 NOTE — PROGRESS NOTES
Pt to CT scan via wheelchair. Transported with [facility]. In no apparent distress upon departure.

## 2018-03-13 NOTE — ASSESSMENT & PLAN NOTE
- TTE 3/12/18 showed LVEF 50-55% (but no substantial change when compared to previous echo), RV normal and IVC 8  - Had -ve DSE on 11/30/17  - Current immunosuppression: Myfortic 720 mg bid, Pred 2.5 mg qd and Prograf 3 mg bid with goal level 6-9. Level today again elevated at 13.5 - will hold Prograf tonight and decrease to 2 mg bid tomorrow  - RHC +/- EMBx on Thursday

## 2018-03-13 NOTE — TELEPHONE ENCOUNTER
GUS rec'd call from Renetta Meredith in Patient Relations asking for an update on this pt's referral to [facility]. GUS explained that she had provided contact info to pt on last week, but she can call back if necessary to follow-up. Patient Relations indicated that pt may be expecting a call regarding the status of that referral. It was noted in the conversation that pt is currently being treated at [facility] as an inpatient. SW will f/u with pt and/or his wife.     GUS called and spoke with pt's wife who indicated that pt was in fact at [facility] in [place] and may be headed to ICU soon. Wife expressed her disappointed in how pt was treated at [facility]. She reported she does have the phone numbers to [facility] in [place]. She indicated she has called twice and left messages but no one has gotten back with her to scheduled an appt. GUS offered to give her the Tatyana WBEER's contact, but she declined that info for now. Wife explained they will remain being treated in [place] and hoping he will get the [facility] as an in-patient during his admittance. GUS explained that since there was no hematology clinic in [place], pt would have to establish care within the [facility] (with a PCP) before we submit any records on his behalf. Wife gave verbal permission to send the records, but she explained he may not need to be seen there if [facility] in [place] will continue to treat pt. SW encouraged wife to call back if further assistance is needed. Call ended well. SW will f/u as requested.

## 2018-03-13 NOTE — PROGRESS NOTES
[facility] with family member at bedside and all personal belongings in tow on portable monitor.  Notified receiving nurse of blood in patient's sputum which occurred when patient using restroom prior to transfer.

## 2018-03-13 NOTE — PROGRESS NOTES
[facility]  Heart Transplant  Progress Note    Patient Name: Lv Crocker  MRN: [id number]  Admission Date: 3/11/2018  Hospital Length of Stay: 1 days  Attending Physician: Jose A Lloyd MD  Primary Care Provider: Philippe Mohr MD  Principal Problem:Diarrhea    Subjective:     Interval History: Remains SOB at rest, and had drop in O2 sat earlier this morning to 80% on RA while coughing. Is now on 5 LPM with sats in the low 90's.     Continuous Infusions:  Scheduled Meds:   aspirin  81 mg Oral Daily    enoxaparin  30 mg Subcutaneous Daily    escitalopram oxalate  20 mg Oral Daily    gabapentin  900 mg Oral TID    insulin detemir U-100  5 Units Subcutaneous BID    levothyroxine  125 mcg Oral QAM    mycophenolate  720 mg Oral BID    nortriptyline  25 mg Oral QHS    pantoprazole  40 mg Oral Daily    pravastatin  40 mg Oral Daily    predniSONE  2.5 mg Oral Daily    sodium chloride 0.9%  3 mL Intravenous Q8H    tacrolimus  3 mg Oral BID    tamsulosin  0.4 mg Oral QHS     PRN Meds:albuterol sulfate, benzonatate, dextrose 50%, dextrose 50%, glucagon (human recombinant), glucose, glucose, insulin aspart U-100, ondansetron, promethazine-codeine 6.25-10 mg/5 ml    Review of patient's allergies indicates:   Allergen Reactions    No known drug allergies      Objective:     Vital Signs (Most Recent):  Temp: 99.6 °F (37.6 °C) (03/13/18 1043)  Pulse: (!) 119 (03/13/18 1050)  Resp: 16 (03/13/18 1043)  BP: (!) 118/56 (03/13/18 1043)  SpO2: (!) 93 % (03/13/18 1043) Vital Signs (24h Range):  Temp:  [97.5 °F (36.4 °C)-99.6 °F (37.6 °C)] 99.6 °F (37.6 °C)  Pulse:  [] 119  Resp:  [16-20] 16  SpO2:  [90 %-94 %] 93 %  BP: ()/(52-67) 118/56     Patient Vitals for the past 72 hrs (Last 3 readings):   Weight   03/13/18 0400 78.6 kg (173 lb 4.5 oz)   03/12/18 0100 78.9 kg (174 lb)   03/11/18 1818 82.1 kg (181 lb)     Body mass index is 27.14 kg/m².      Intake/Output Summary (Last 24  hours) at 03/13/18 1057  Last data filed at 03/13/18 1000   Gross per 24 hour   Intake          2538.75 ml   Output              500 ml   Net          2038.75 ml       Hemodynamic Parameters:       Telemetry: ST    Physical Exam   Constitutional: He appears well-developed.   Appears ill   HENT:   Head: Normocephalic and atraumatic.   Eyes: Conjunctivae and EOM are normal. Pupils are equal, round, and reactive to light.   Neck: Normal range of motion. No JVD present. No thyromegaly present.   Cardiovascular: Regular rhythm.    Tachycardic   Pulmonary/Chest: Effort normal.   Breath sounds are slightly decreased right base. Essentially CTA bilaterally   Abdominal: Soft. Bowel sounds are normal.   + ascites   Musculoskeletal: Normal range of motion. He exhibits no edema.   Neurological: He is alert.   Skin: Skin is warm and dry. Capillary refill takes less than 2 seconds. There is pallor.   Psychiatric: He has a normal mood and affect. His behavior is normal. Judgment and thought content normal.       Significant Labs:  CBC:    Recent Labs  Lab 03/11/18 1922 03/12/18 0443 03/13/18 0441   WBC 3.30* 3.05* 2.92*   RBC 2.78* 2.62* 2.60*   HGB 8.2* 7.7* 7.7*   HCT 25.1* 24.1* 23.8*    187 148*   MCV 90 92 92   MCH 29.5 29.4 29.6   MCHC 32.7 32.0 32.4     BNP:    Recent Labs  Lab 03/11/18 1922 03/13/18  0815   * 359*     CMP:    Recent Labs  Lab 03/11/18 1922 03/12/18 0443 03/13/18 0442   *  166* 121* 191*   CALCIUM 9.8  9.8 9.3 8.7   ALBUMIN 3.2* 2.9* 2.9*   PROT 6.9 6.2 6.2     136 136 137   K 4.6  4.6 4.9 5.0   CO2 21*  21* 20* 19*     101 103 103   BUN 35*  35* 35* 35*   CREATININE 3.4*  3.4* 3.1* 3.3*   ALKPHOS 317* 298* 303*   ALT 9* 8* 9*   AST 18 17 18   BILITOT 1.3* 1.2* 0.9      Coagulation:   No results for input(s): PT, INR, APTT in the last 168 hours.  LDH:  No results for input(s): LDH in the last 72 hours.  Microbiology:  Microbiology Results (last 7 days)      Procedure Component Value Units Date/Time    Stool culture **CANNOT BE ORDERED STAT** [[id number]] Collected:  03/11/18 1923    Order Status:  Completed Specimen:  Stool from Stool Updated:  03/12/18 1037     Stool Culture No growth    Clostridium difficile EIA [[id number]] Collected:  03/11/18 1923    Order Status:  Completed Specimen:  Stool from Stool Updated:  03/11/18 2355     C. diff Antigen Negative     C difficile Toxins A+B, EIA Negative     Comment: Testing not recommended for children <24 months old.       Stool culture [[id number]]     Order Status:  Completed Specimen:  Stool from Stool     Stool culture [[id number]]     Order Status:  Canceled Specimen:  Stool from Stool     E. coli 0157 antigen [[id number]] Collected:  03/11/18 1923    Order Status:  No result Specimen:  Stool from Stool Updated:  03/11/18 2001          I have reviewed all pertinent labs within the past 24 hours.    Estimated Creatinine Clearance: 26.7 mL/min (A) (based on SCr of 3.3 mg/dL (H)).    Diagnostic Results:  I have reviewed all pertinent imaging results/findings within the past 24 hours.    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 Most recently has had ongoing issues with his lungs, had gotten 2 thoracenteses, after which he underwent VATS 01/19/18 followed by pleurex catheter placement and effusion drainage 01/11/18, subsequently had it removed 02/07/18 after drainage had decreased despite multiple attempts to drain it. Has been having significant coughing fits that result in him being near-syncopal at times, although difficult to say if he has passed out or not- patient most recently was admitted to the hospital for increased AGUILAR, coughing and pre-syncope 02/11-02/14/18 at [facility].   Patient was started on antibiotics for suspected  bacterial infection, with some diarrhea, bronchitis, and possible pneumonia. Ct chest showed some pleural fluid collection and after talking with Dr. James, we arranged for him to receive a diagnostic tap with IR, from which the fluid collection demonstrated no growth or significant findings at all really. Cell counts do not appear to have been sent but will recheck. Patient states since his hospital stay, yesterday had a significant coughing fit again, after which he nearly passed out, is being seen in clinic today for this. Denies any cardiopulmonary complaints, no leg swelling, has some abdominal swelling, which is moderate for him. Denies significant shortness of breath with mild exertion, had 6MWT yesterday to see if he qualified for home O2, and his O2 sats actually improved after recovery from where he started initially. He and his wife admit he is in bed most days, not very active.     Mr. Crocker presented to the ED 3/11/18 with approximately 3 weeks of worsening diarrhea and 2-3 days of sinus pressure, drainage, and worsened cough.  He notes that he had a coughing fit last night and passed out, coughed throughout most of the night.  This also happened on 2/25/18.  He last saw Dr Ferreira in clinic on 2/20/18 where he was taken off myfortic and switched to cellcept (he hadn't been on cellcept because of leukopenia when they tried post-transplant).  Though his diarrhea had improved after his last discharge, he states it has increased now to 15x/day, clear/yellow/green, no fevers, no exacerbating foods per say.  He was taken back off the cellcept and placed back on myfortic this past week and has not noticed immediate change in diarrhea. He also reports his baseline coughing fits havent improved and are minimally responsive to tessalon pearls.  Came on last night, he lost consciousness during a fit once which is relatively normal for him, and had two more during HPI.  He notes no sick contacts but does  state he's had some URI symptoms as above.  His baseline vitals are usually -120 and BP 90s/60s-110s/70s.  He reports a history of intermittent diarrhea - did have Cdiff many years ago but this smells different.  No abdominal pain, no nausea, no vomiting.  No blood in diarrhea.  Appears last c-scope in 2015 with no evidence of infection or inflammatory processes.  He does report IBS which is different that this.       * Diarrhea    - No improvement in watery stools despite changing Cellcept back to Myfortic on Thursday  - C. Diff -ve. Other stool studies pending  - CMV DNA and Norovirus PCR pending  - Consulted GI - plan upper and lower endoscopy today        Cough    - CXR on admit showed moderate right pleural effusion stable  - Sinus X ray on admit showed partial opacification of the maxillary sinuses, but no large air-fluid level is identified  - O2 desat with coughing, now on 5 LPM. Awaiting chest CT  - Will check PFT's  - Continue PPI  - Antitussives  - Appreciate ENT's help. Exam WNL          Restrictive cardiomyopathy    - S/P thoracentesis X 2, followed by VATS/pleurex cath placement (January 2018) with removal of pleurex (February 2018) and 3rd thoracentesis 2/14/18 with no significant findings on fluid removal. Moderate right pleural effusion stable by CXR 3/11/18. Awaiting chest CT today  - Last paracentesis was in January. Abd US 3/12/18 confirmed ongoing ascites (not tense at all). Consider getting paracentesis this admit  - Torsemide on hold for now given watery diarrhea  - Plan RHC Thursday         Heart transplanted    - TTE 3/12/18 showed LVEF 50-55% (but no substantial change when compared to previous echo), RV normal and IVC 8  - Had -ve DSE on 11/30/17  - Current immunosuppression: Myfortic 720 mg bid, Pred 2.5 mg qd and Prograf 3 mg bid with goal level 6-9. Level today again elevated at 13.5 - will hold Prograf tonight and decrease to 2 mg bid tomorrow  - RHC +/- EMBx on Thursday           Leukopenia    - WCC continues to trend down at 2.92 - checking CMV DNA PCR as above, also Norovirus PCR        CKD (chronic kidney disease), stage IV    - Creatinine 3.4 on admit, 3.3 today (baseline ~ 2.6-30)  - Holding Torsemide as above        Type 1 diabetes mellitus with stage 3 chronic kidney disease    - Appreciate Endocrine's recs        Hypothyroidism due to Hashimoto's thyroiditis    - TSH low at 0.101 with normal FT4. Appreciate Endocrine[place] [id number]  Heart Transplant  [facility]

## 2018-03-13 NOTE — SIGNIFICANT EVENT
Contacted by GI lab: endoscopies aborted 2/2 SOB/hypoxia. Will transfer patient to ICU for careful monitoring. Dr. Eric with Transplant ID in to see patient. Pulmonology consult pending.

## 2018-03-13 NOTE — CONSULTS
"[facility]  Pulmonology  Consult Note    Patient Name: Lv Crocker  MRN: [id number]  Admission Date: 3/11/2018  Hospital Length of Stay: 1 days  Code Status: Full Code  Attending Physician: Jose A Lloyd MD  Primary Care Provider: Philippe Mohr MD   Principal Problem: Diarrhea    Inpatient consult to Pulmonology  Consult performed by: TITI VALADEZ  Consult ordered by: SILVIANO BRINK  Reason for consult: left lung opacities on CT chest         Subjective:     HPI:  42 year old male with Hashimoto thyroditis, DM-1 on insulin, Heart transplant (2014) on chronic immunosuppression, complicated with constrictive/restrictive cardiomyopathy, recurrent ascitis and pleural effusions. Who presented on 3/11 with worsening diarrhea, and cough for 3 weeks.   He reports associated right sided chest pain that is worse with breathing. Coughing fits that ends by passing out or voimiting. He is having 5-10 episodes of non bloody diarrhea daily.     In December/17 he was admitted for bilateral CAP and was treated initially with ceftriaxone and azithromycin then it was switched to Levaquin. On 1/11/18 he had pleurex catheter placed that was removed on 2/7. On 1/19/18 he underwent VATS with pleurodesis on the right side. He was recently admitted on 2/11-14 with similar sx of coughing fits and diarrhea. IR performed a right thoracentesis which was unremarkable and all his bacterial and viral cultures were negative.     Past Medical History:   Diagnosis Date    Arthritis     Ascites     Thought to be 2/2 heart tpx; liver bx 3/31/17 w/o significant fibrosis    Cardiomyopathy     Depression     Controlled "for the most part" on Lexipro    Encounter for blood transfusion     during transplant    GERD (gastroesophageal reflux disease)     Hashimoto's disease     History of CHF (congestive heart failure)     Previously EF 20% (prior to heart transplant); last EF 60%, PAP 41 as of 12/12/17; throught " to be 2/2 genetics & DM1    History of coronary artery disease     H/o Coronary artery disease; resolved since heart transplant 2014    History of myocardial infarction     h/o MI x3 prior to heart transplant    HLD (hyperlipidemia) 6/11/2015    Hypoglycemia unawareness in type 1 diabetes mellitus     Hypothyroid     Initially hyperthyroid 2/2 Hoshimoto's thyroiditis; now on levothyroxine 150 mcg qd    Pleural effusion due to another disorder     Thought to be 2/2 heart tpx; s/p PleuRx catheter placement and removal after 1-2 months    Pulmonary hypertension assoc with unclear multi-factorial mechanisms 12/12/2017    PAP 41 (EF 60%) on ECHO 12/12/17    Syncope 6/30/2015    Type I diabetes mellitus, well controlled     Well controlled; Dx'd @5y/o; on N insulin 20U BID & Humalog 10U w/meals +SSI; Glu 89 11/9/17; A1c 7.2% 4/5/17    Unspecified essential hypertension 05/29/2014    Well controlled; Last /57 on 11/9/17       Past Surgical History:   Procedure Laterality Date    CARDIAC CATHETERIZATION      CARDIAC SURGERY      CHOLECYSTECTOMY      CORONARY ANGIOPLASTY      FOOT SPLIT TRANSFER OF THE POSTERIOR TIBIALIS TENDON PROCEDURE      HEART TRANSPLANT  2014    KNEE SURGERY      PleuRx catheter placement  01/11/2018    Plan for removal after 1-2 months by Dr. James in cardiothoracic surgery    s/p oht  November 2014    stent placemnet         Review of patient's allergies indicates:   Allergen Reactions    No known drug allergies        Family History     Problem Relation (Age of Onset)    Cancer Brother (50)    Diabetes Mother, Father, Brother, Son, Sister, Maternal Uncle, Paternal Aunt, Maternal Grandmother, Maternal Grandfather    Heart disease Mother, Brother    Hypertension Mother, Father, Brother    Kidney disease Mother, Father    Stroke Father, Brother    Vision loss Brother        Social History Main Topics    Smoking status: Never Smoker    Smokeless tobacco: Never Used     Alcohol use No    Drug use: No    Sexual activity: Yes     Partners: Female         Review of Systems   Constitutional: Positive for activity change, appetite change, fatigue and fever.   Respiratory: Positive for cough and shortness of breath.    Cardiovascular: Positive for chest pain. Negative for leg swelling.   Gastrointestinal: Positive for diarrhea and vomiting.   Skin: Positive for pallor.   Allergic/Immunologic: Positive for immunocompromised state.     Objective:     Vital Signs (Most Recent):  Temp: 100.2 °F (37.9 °C) (03/13/18 1345)  Pulse: (!) 122 (03/13/18 1345)  Resp: 18 (03/13/18 1345)  BP: (!) 106/55 (03/13/18 1345)  SpO2: (!) 93 % (03/13/18 1345) Vital Signs (24h Range):  Temp:  [97.5 °F (36.4 °C)-100.8 °F (38.2 °C)] 100.2 °F (37.9 °C)  Pulse:  [] 122  Resp:  [16-20] 18  SpO2:  [88 %-93 %] 93 %  BP: ()/(54-69) 106/55     Weight: 78.6 kg (173 lb 4.5 oz)  Body mass index is 27.14 kg/m².      Intake/Output Summary (Last 24 hours) at 03/13/18 1547  Last data filed at 03/13/18 1000   Gross per 24 hour   Intake          1543.75 ml   Output              350 ml   Net          1193.75 ml       Physical Exam   Constitutional: He is oriented to person, place, and time. He appears well-developed and well-nourished. He appears ill. He appears distressed.   HENT:   Head: Normocephalic and atraumatic.   Eyes: EOM are normal.   Neck: Neck supple. JVD present.   Cardiovascular: Intact distal pulses.  Exam reveals friction rub.    No murmur heard.  Tachycardia    Pulmonary/Chest: He is in respiratory distress.   On 5 L nc.   Decreased breath sounds bilaterally up to the upper lungs. With fine crackles at the upper lungs.    Abdominal: Soft.   Musculoskeletal: He exhibits no edema.   Neurological: He is alert and oriented to person, place, and time.   Skin: Skin is warm and dry. He is not diaphoretic. There is pallor.   Psychiatric: He has a normal mood and affect.   Vitals  reviewed.        Lines/Drains/Airways     Peripheral Intravenous Line                 Peripheral IV - Single Lumen 03/11/18 1931 Left Hand 1 day                Significant Labs:    CBC/Anemia Profile:    Recent Labs  Lab 03/11/18 1922 03/11/18 1923 03/12/18 0443 03/13/18 0441   WBC 3.30*  --  3.05* 2.92*   HGB 8.2*  --  7.7* 7.7*   HCT 25.1*  --  24.1* 23.8*     --  187 148*   MCV 90  --  92 92   RDW 13.2  --  13.2 13.2   OCCULTBLOOD  --  Negative  --   --         Chemistries:    Recent Labs  Lab 03/11/18 1922 03/12/18 0443 03/13/18 0442     136 136 137   K 4.6  4.6 4.9 5.0     101 103 103   CO2 21*  21* 20* 19*   BUN 35*  35* 35* 35*   CREATININE 3.4*  3.4* 3.1* 3.3*   CALCIUM 9.8  9.8 9.3 8.7   ALBUMIN 3.2* 2.9* 2.9*   PROT 6.9 6.2 6.2   BILITOT 1.3* 1.2* 0.9   ALKPHOS 317* 298* 303*   ALT 9* 8* 9*   AST 18 17 18   MG  --  1.4* 1.3*       Significant Imaging:   CT: I have reviewed all pertinent results/findings within the past 24 hours and my personal findings are:  right lung with paryncemal changes. left lung with new opacities concerning for infection.     Assessment/Plan:     Pneumonia    Patient presenting with worsening cough over the course of the past 3 months. He is septic with leukopenia, tachycardia, fever, and worsening hypoxemic respiratory failure. His Chest CT shows new opacities in the left lung with tree in bud appearance concerning for infection in this patient with chronic immunosuppression state. Other differential is pulmonary edema related to his restrictive cardiomyopathy.   Pulmonary were consulted for Bronchoscopy.     Plan:   - He will need a safe airway prior to bronchoscopy.    - Would recommend starting treatment with broad spectrum antibiotics and antifungal as soon as possible. Will defer choice of antibiotics to Transplant-ID team.   - NPO at midnight.                   Thank you for your consult. I will follow-up with patient. Please contact us if  you have any additional questions.        Giovana Coleman MD  Pulmonology  [facility]

## 2018-03-13 NOTE — PLAN OF CARE
Problem: Patient Care Overview  Goal: Plan of Care Review  Outcome: Ongoing (interventions implemented as appropriate)  Pt aao x 4. Pt currently in bedside chair with call light in reach. Free from falls/injury this shift and wearing non-skid footwear when ambulating. Wife at bedside. IV fluids D/C. Pt had CT of chest completed this morning, and EGD cancelled d/t SOB. Pt currently on 4LNC with O2 @ 93%. PRN codeine, T. Pearls given to pt today. PRN breathing tx ordered. Sputum specimen collected and sent. Inpt consult to ID and pulm. Possible bronch tomorrow. Pt to transfer to CMICU room 3084 this afternoon, report given to SANFORD Shook.    Will continue to monitor.

## 2018-03-13 NOTE — ASSESSMENT & PLAN NOTE
Patient presenting with worsening cough over the course of the past 3 months. He is septic with leukopenia, tachycardia, fever, and worsening hypoxemic respiratory failure. His Chest CT shows new opacities in the left lung with tree in bud appearance concerning for infection in this patient with chronic immunosuppression state. Other differential is pulmonary edema related to his restrictive cardiomyopathy.   Pulmonary were consulted for Bronchoscopy.     Plan:   - He will need a safe airway prior to bronchoscopy.    - Would recommend starting treatment with broad spectrum antibiotics and antifungal as soon as possible. Will defer choice of antibiotics to Transplant-ID team.   - NPO at midnight.

## 2018-03-13 NOTE — SUBJECTIVE & OBJECTIVE
"Past Medical History:   Diagnosis Date    Arthritis     Ascites     Thought to be 2/2 heart tpx; liver bx 3/31/17 w/o significant fibrosis    Cardiomyopathy     Depression     Controlled "for the most part" on Lexipro    Encounter for blood transfusion     during transplant    GERD (gastroesophageal reflux disease)     Hashimoto's disease     History of CHF (congestive heart failure)     Previously EF 20% (prior to heart transplant); last EF 60%, PAP 41 as of 12/12/17; throught to be 2/2 genetics & DM1    History of coronary artery disease     H/o Coronary artery disease; resolved since heart transplant 2014    History of myocardial infarction     h/o MI x3 prior to heart transplant    HLD (hyperlipidemia) 6/11/2015    Hypoglycemia unawareness in type 1 diabetes mellitus     Hypothyroid     Initially hyperthyroid 2/2 Hoshimoto's thyroiditis; now on levothyroxine 150 mcg qd    Pleural effusion due to another disorder     Thought to be 2/2 heart tpx; s/p PleuRx catheter placement and removal after 1-2 months    Pulmonary hypertension assoc with unclear multi-factorial mechanisms 12/12/2017    PAP 41 (EF 60%) on ECHO 12/12/17    Syncope 6/30/2015    Type I diabetes mellitus, well controlled     Well controlled; Dx'd @5y/o; on N insulin 20U BID & Humalog 10U w/meals +SSI; Glu 89 11/9/17; A1c 7.2% 4/5/17    Unspecified essential hypertension 05/29/2014    Well controlled; Last /57 on 11/9/17       Past Surgical History:   Procedure Laterality Date    CARDIAC CATHETERIZATION      CARDIAC SURGERY      CHOLECYSTECTOMY      CORONARY ANGIOPLASTY      FOOT SPLIT TRANSFER OF THE POSTERIOR TIBIALIS TENDON PROCEDURE      HEART TRANSPLANT  2014    KNEE SURGERY      PleuRx catheter placement  01/11/2018    Plan for removal after 1-2 months by Dr. James in cardiothoracic surgery    s/p oht  November 2014    stent placemnet         Review of patient's allergies indicates:   Allergen Reactions    " No known drug allergies        Medications:  Prescriptions Prior to Admission   Medication Sig    aspirin (ECOTRIN) 81 MG EC tablet Take 1 tablet (81 mg total) by mouth once daily.    escitalopram oxalate (LEXAPRO) 20 MG tablet Take 1 tablet (20 mg total) by mouth once daily.    gabapentin (NEURONTIN) 300 MG capsule Take 3 capsules (900 mg total) by mouth 3 (three) times daily.    insulin aspart (NOVOLOG) 100 unit/mL InPn pen Inject 4 Units into the skin 3 (three) times daily.    insulin detemir (LEVEMIR FLEXTOUCH) 100 unit/mL (3 mL) SubQ InPn pen Inject 15 Units into the skin every evening. (Patient taking differently: Inject 15 Units into the skin 2 (two) times daily. )    lancets Misc 1 Device by Misc.(Non-Drug; Combo Route) route 4 (four) times daily with meals and nightly.    levothyroxine (SYNTHROID) 150 MCG tablet Take 1 tablet (150 mcg total) by mouth every morning.    magnesium oxide (MAG-OX) 400 mg tablet Take 1 tablet (400 mg total) by mouth once daily.    mycophenolate (MYFORTIC) 360 MG TbEC Take 2 tablets (720 mg total) by mouth 2 (two) times daily.    nortriptyline (PAMELOR) 25 MG capsule Take 1 capsule (25 mg total) by mouth every evening.    ondansetron (ZOFRAN-ODT) 4 MG TbDL Take 2 tablets (8 mg total) by mouth every 8 (eight) hours as needed (nausea).    pantoprazole (PROTONIX) 40 MG tablet TAKE ONE TABLET BY MOUTH EVERY DAY    pravastatin (PRAVACHOL) 40 MG tablet Take 1 tablet (40 mg total) by mouth every evening. (Patient taking differently: Take 40 mg by mouth once daily. )    predniSONE (DELTASONE) 2.5 MG tablet Take 1 tablet (2.5 mg total) by mouth once daily. S/P Heart Transplant 11/18/2014 ICD-10 Z94.1    tacrolimus (PROGRAF) 1 MG Cap Taked 3mg orally in the morning and 3mg orally in the evening. S/p heart transplant  11/18/2014  ICD-10 Z94.1    tamsulosin (FLOMAX) 0.4 mg Cp24 TAKE 2 CAPSULES(0.8 MG) BY MOUTH EVERY EVENING    torsemide (DEMADEX) 20 MG Tab Take 2 tablets (40  "mg total) by mouth once daily.    pen needle, diabetic 32 gauge x 5/32" Ndle Uses 5 pen needles a day     Antibiotics     Start     Stop Route Frequency Ordered    03/13/18 1700  vancomycin (VANCOCIN) 1,250 mg in sodium chloride 0.9% 250 mL IVPB      -- IV Once 03/13/18 1558    03/13/18 1700  piperacillin-tazobactam 4.5 g in sodium chloride 0.9% 100 mL IVPB (ready to mix system)      -- IV Every 8 hours (non-standard times) 03/13/18 1558    03/13/18 1645  azithromycin tablet 500 mg      -- Oral Daily 03/13/18 1638        Antifungals     Start     Stop Route Frequency Ordered    03/15/18 0900  posaconazole tablet 300 mg      -- Oral Daily 03/13/18 1638    03/13/18 1645  posaconazole tablet 300 mg      03/14 2059 Oral 2 times daily 03/13/18 1638        Antivirals         Stop Route Frequency     ganciclovir (CYTOVENE) injection      -- IV Every 24 hours (non-standard times)           Immunization History   Administered Date(s) Administered    Hepatitis A / Hepatitis B 12/07/2012, 02/28/2017, 08/24/2017, 10/09/2017    Influenza - High Dose 11/10/2015, 10/02/2017    Influenza - Quadrivalent - PF 12/07/2012    Influenza - Trivalent - PF (ADULT) 12/07/2012    Influenza A (H1N1) 2009 Monovalent - IM - PF 11/12/2009    Pneumococcal Conjugate - 13 Valent 10/02/2017    Pneumococcal Polysaccharide - 23 Valent 12/07/2012    Tdap 12/07/2012    Zoster 12/07/2012    influenza - Quadrivalent 11/18/2016       Family History     Problem Relation (Age of Onset)    Cancer Brother (50)    Diabetes Mother, Father, Brother, Son, Sister, Maternal Uncle, Paternal Aunt, Maternal Grandmother, Maternal Grandfather    Heart disease Mother, Brother    Hypertension Mother, Father, Brother    Kidney disease Mother, Father    Stroke Father, Brother    Vision loss Brother        Social History     Social History    Marital status:      Spouse name: N/A    Number of children: N/A    Years of education: N/A     Social History Main " Topics    Smoking status: Never Smoker    Smokeless tobacco: Never Used    Alcohol use No    Drug use: No    Sexual activity: Yes     Partners: Female     Other Topics Concern    None     Social History Narrative            Breakfast: Skips 80%; cereal; Diet Sprite    Lunch: Turkey or chicken sandwich on white bread; Diet Sprite    Dinner: Grilled burgers, small amount chips; Diet Sprite    Snacks: Ronny crackers before bed on most nights    Eats out: 2x/wk    Water: 0    PA: Walks 15 minutes (strenuous) daily    Goal weight 170-175 lbs by 1/2018     Review of Systems   Constitutional: Positive for activity change and chills. Negative for appetite change, diaphoresis and fever.   HENT: Positive for sinus pressure. Negative for ear pain, mouth sores and sore throat.    Eyes: Negative for photophobia, pain and redness.   Respiratory: Positive for cough and shortness of breath. Negative for wheezing.    Cardiovascular: Negative for chest pain and leg swelling.   Gastrointestinal: Positive for abdominal pain, diarrhea and nausea. Negative for abdominal distention.   Genitourinary: Negative for dysuria, flank pain, frequency and urgency.   Musculoskeletal: Positive for back pain. Negative for arthralgias, gait problem and myalgias.   Skin: Negative for pallor and rash.   Neurological: Positive for headaches. Negative for dizziness, tremors and seizures.   Psychiatric/Behavioral: Positive for confusion.     Objective:     Vital Signs (Most Recent):  Temp: 100.2 °F (37.9 °C) (03/13/18 1345)  Pulse: (!) 122 (03/13/18 1652)  Resp: (!) 26 (03/13/18 1652)  BP: (!) 106/55 (03/13/18 1345)  SpO2: 98 % (03/13/18 1652) Vital Signs (24h Range):  Temp:  [97.5 °F (36.4 °C)-100.8 °F (38.2 °C)] 100.2 °F (37.9 °C)  Pulse:  [] 122  Resp:  [16-26] 26  SpO2:  [88 %-98 %] 98 %  BP: ()/(54-69) 106/55     Weight: 78.6 kg (173 lb 4.5 oz)  Body mass index is 27.14 kg/m².    Estimated Creatinine Clearance: 26.7 mL/min  (A) (based on SCr of 3.3 mg/dL (H)).    Physical Exam   Constitutional: He is oriented to person, place, and time. He appears well-developed and well-nourished. No distress.   Chronically ill appearing.   HENT:   Head: Atraumatic.   Mouth/Throat: Oropharynx is clear and moist. No oropharyngeal exudate.   Eyes: Conjunctivae and EOM are normal. Pupils are equal, round, and reactive to light. No scleral icterus.   Neck: Neck supple.   Cardiovascular: Normal rate and regular rhythm.  Exam reveals no friction rub.    No murmur heard.  Pulmonary/Chest: No respiratory distress. He has no wheezes. He has rales. He exhibits no tenderness.   Diminished in bases.   Abdominal: Soft. Bowel sounds are normal. He exhibits no distension. There is tenderness. There is no rebound and no guarding.   Musculoskeletal: Normal range of motion. He exhibits no edema.   Lymphadenopathy:     He has no cervical adenopathy.   Neurological: He is alert and oriented to person, place, and time. No cranial nerve deficit. He exhibits normal muscle tone. Coordination normal.   Skin: No rash noted. No erythema.       Significant Labs:   CBC:     Recent Labs  Lab 03/11/18  1922 03/12/18  0443 03/13/18  0441   WBC 3.30* 3.05* 2.92*   HGB 8.2* 7.7* 7.7*   HCT 25.1* 24.1* 23.8*    187 148*       Significant Imaging: I have reviewed all pertinent imaging results/findings within the past 24 hours.     CT chest:  Status post heart transplantation with new multifocal subsegmental consolidation throughout both lungs.  Similar findings were present on the CT dated 12/12/2017 with partial improvement on 02/14/2018 therefore we doubt malignancy.  Differential considerations include multifocal infectious pneumonia, hemorrhage, or aspiration.  We consider pulmonary edema due to abnormal capillary permeability or cardiac decompensation less likely.  Lymphoma could present similarly although felt less likely due to improvement on CT of 2/14/2018.    Continued  moderate sized incompletely dependent right pleural effusion with rounded consolidation in the right lower lobe which probably represents rounded atelectasis, similar to prior, new from 7/19/2016.    Redemonstration of mild scattered ascites in the upper abdomen.    Microbiology:  3/11 stool WBC negative  3/11 stool cx: negative  3/11 O&P negative  3/11 C.diff negative  3/11 Giardia antigen negative  3/11 Cryptosporidium antigen negative  3/11 rotavirus negative  3/11 norovirus pending  3/12 CMV quant pending  3/13 blood cx: pending   3/13 sputum cx: pending  3/13 aspergillus antigen pending  3/13 fungitell pending  3/13 urine histo pending  3/13 urine blasto pending  3/13 crypto antigen pending  3/13 urine legionella pending  3/13 RVP pending  3/13 adenovirus stool EIA pending  3/13 Strongyloides ab pending  3/13 [place] stool pathogens panel pending  3/14 quantiferon pending

## 2018-03-13 NOTE — CARE UPDATE
RN Proactive Rounding Note  Time of Visit: 1405    Admit Date: 3/11/2018  LOS: 1  Code Status: Full Code   Date of Visit: 2018  : 1975  Age: 42 y.o.  Sex: male  Bed: 8067/8067 A:   MRN: [id number]  Was the patient discharged from an ICU this admission? No   Was the patient discharged from a PACU within last 24 hours? No  Did the patient receive conscious sedation/general anesthesia in last 24 hours? No  Was the patient in the ED within the past 24 hours? No  Was the patient started on NIPPV within the past 24 hours? No  Attending Physician: Jose A Lloyd MD  Primary Service: Networked reference to record PCT       ASSESSMENT:     Modified Early Warning Score (MEWS): 5  Abnormal Vital Signs: Tachycardic, Hypotensive, Febrile, Hypoixc  Clinical Issues: Circulatory     INTERVENTIONS/ RECOMMENDATIONS:     Performed proactive rounding to assess pt's hypotension, hypoxia, tachycardia, and febrile episode. Primary RN states pt went down for endoscopy, but procedure was aborted due to pt's c/o increasing SOB and hypoxia. Upon assessment, pt c/o SOB. Currently on 5 LNC. SaO2 92%. Respirations shallow. . HTS NP at bedside to assess pt. Pulmonology consulted for possible bronchoscopy. ID consulted. Orders placed to tx pt to CMICU.     Discussed plan of care with SANFORD CHAUDHRY ESCALATION:     Yes/No Yes    Orders received and case discussed with NP Dani    Disposition: Tx to CMICU RM 1174.    FOLLOW-UP/CONTINGENCY:       Call back the Rapid Response Nurse at [phone]  for additional questions or concerns

## 2018-03-13 NOTE — SUBJECTIVE & OBJECTIVE
Interval History: Remains SOB at rest, and had drop in O2 sat earlier this morning to 80% on RA while coughing. Is now on 5 LPM with sats in the low 90's.     Continuous Infusions:  Scheduled Meds:   aspirin  81 mg Oral Daily    enoxaparin  30 mg Subcutaneous Daily    escitalopram oxalate  20 mg Oral Daily    gabapentin  900 mg Oral TID    insulin detemir U-100  5 Units Subcutaneous BID    levothyroxine  125 mcg Oral QAM    mycophenolate  720 mg Oral BID    nortriptyline  25 mg Oral QHS    pantoprazole  40 mg Oral Daily    pravastatin  40 mg Oral Daily    predniSONE  2.5 mg Oral Daily    sodium chloride 0.9%  3 mL Intravenous Q8H    tacrolimus  3 mg Oral BID    tamsulosin  0.4 mg Oral QHS     PRN Meds:albuterol sulfate, benzonatate, dextrose 50%, dextrose 50%, glucagon (human recombinant), glucose, glucose, insulin aspart U-100, ondansetron, promethazine-codeine 6.25-10 mg/5 ml    Review of patient's allergies indicates:   Allergen Reactions    No known drug allergies      Objective:     Vital Signs (Most Recent):  Temp: 99.6 °F (37.6 °C) (03/13/18 1043)  Pulse: (!) 119 (03/13/18 1050)  Resp: 16 (03/13/18 1043)  BP: (!) 118/56 (03/13/18 1043)  SpO2: (!) 93 % (03/13/18 1043) Vital Signs (24h Range):  Temp:  [97.5 °F (36.4 °C)-99.6 °F (37.6 °C)] 99.6 °F (37.6 °C)  Pulse:  [] 119  Resp:  [16-20] 16  SpO2:  [90 %-94 %] 93 %  BP: ()/(52-67) 118/56     Patient Vitals for the past 72 hrs (Last 3 readings):   Weight   03/13/18 0400 78.6 kg (173 lb 4.5 oz)   03/12/18 0100 78.9 kg (174 lb)   03/11/18 1818 82.1 kg (181 lb)     Body mass index is 27.14 kg/m².      Intake/Output Summary (Last 24 hours) at 03/13/18 1057  Last data filed at 03/13/18 1000   Gross per 24 hour   Intake          2538.75 ml   Output              500 ml   Net          2038.75 ml       Hemodynamic Parameters:       Telemetry: ST    Physical Exam   Constitutional: He appears well-developed.   Appears ill   HENT:   Head:  Normocephalic and atraumatic.   Eyes: Conjunctivae and EOM are normal. Pupils are equal, round, and reactive to light.   Neck: Normal range of motion. No JVD present. No thyromegaly present.   Cardiovascular: Regular rhythm.    Tachycardic   Pulmonary/Chest: Effort normal.   Breath sounds are slightly decreased right base. Essentially CTA bilaterally   Abdominal: Soft. Bowel sounds are normal.   + ascites   Musculoskeletal: Normal range of motion. He exhibits no edema.   Neurological: He is alert.   Skin: Skin is warm and dry. Capillary refill takes less than 2 seconds. There is pallor.   Psychiatric: He has a normal mood and affect. His behavior is normal. Judgment and thought content normal.       Significant Labs:  CBC:    Recent Labs  Lab 03/11/18 1922 03/12/18 0443 03/13/18 0441   WBC 3.30* 3.05* 2.92*   RBC 2.78* 2.62* 2.60*   HGB 8.2* 7.7* 7.7*   HCT 25.1* 24.1* 23.8*    187 148*   MCV 90 92 92   MCH 29.5 29.4 29.6   MCHC 32.7 32.0 32.4     BNP:    Recent Labs  Lab 03/11/18 1922 03/13/18  0815   * 359*     CMP:    Recent Labs  Lab 03/11/18 1922 03/12/18 0443 03/13/18 0442   *  166* 121* 191*   CALCIUM 9.8  9.8 9.3 8.7   ALBUMIN 3.2* 2.9* 2.9*   PROT 6.9 6.2 6.2     136 136 137   K 4.6  4.6 4.9 5.0   CO2 21*  21* 20* 19*     101 103 103   BUN 35*  35* 35* 35*   CREATININE 3.4*  3.4* 3.1* 3.3*   ALKPHOS 317* 298* 303*   ALT 9* 8* 9*   AST 18 17 18   BILITOT 1.3* 1.2* 0.9      Coagulation:   No results for input(s): PT, INR, APTT in the last 168 hours.  LDH:  No results for input(s): LDH in the last 72 hours.  Microbiology:  Microbiology Results (last 7 days)     Procedure Component Value Units Date/Time    Stool culture **CANNOT BE ORDERED STAT** [[id number]] Collected:  03/11/18 1923    Order Status:  Completed Specimen:  Stool from Stool Updated:  03/12/18 1037     Stool Culture No growth    Clostridium difficile EIA [[id number]] Collected:  03/11/18 1923    Order  Status:  Completed Specimen:  Stool from Stool Updated:  03/11/18 4668     C. diff Antigen Negative     C difficile Toxins A+B, EIA Negative     Comment: Testing not recommended for children <24 months old.       Stool culture [[id number]]     Order Status:  Completed Specimen:  Stool from Stool     Stool culture [[id number]]     Order Status:  Canceled Specimen:  Stool from Stool     E. coli 0157 antigen [[id number]] Collected:  03/11/18 1923    Order Status:  No result Specimen:  Stool from Stool Updated:  03/11/18 2001          I have reviewed all pertinent labs within the past 24 hours.    Estimated Creatinine Clearance: 26.7 mL/min (A) (based on SCr of 3.3 mg/dL (H)).    Diagnostic Results:  I have reviewed all pertinent imaging results/findings within the past 24 hours.

## 2018-03-13 NOTE — ASSESSMENT & PLAN NOTE
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 Chronic third spacing with pleural effusions. Admitted for workup of diarrhea and cough. FFL largely within normal limits. No gross lesions or abnormalities identified within upper aerodigestive tract. Cough is wet, and productive, active effusion on CXR.     -No gross abnormality identified within upper aerodigestive tract   No laryngeal lesions/masses/etc  -Suspect cough likely 2/2 lower airway process (wet cough in setting of pleural effusion)   May benefit from Pulmonology evaluation, defer to judgement of primary  -Agree with antireflux medications, optimize as best reasonable  -May benefit from humidified air  -Cough suppressant/lozenge as tolerable.   -Discuss with staff

## 2018-03-14 PROBLEM — J96.01 ACUTE RESPIRATORY FAILURE WITH HYPOXIA: Status: ACTIVE | Noted: 2018-01-01

## 2018-03-14 PROBLEM — N18.4 CKD (CHRONIC KIDNEY DISEASE), STAGE IV: Status: ACTIVE | Noted: 2018-01-01

## 2018-03-14 PROBLEM — R05.9 COUGH: Status: RESOLVED | Noted: 2018-01-01 | Resolved: 2018-01-01

## 2018-03-14 NOTE — ASSESSMENT & PLAN NOTE
- S/P thoracentesis X 2, followed by VATS/pleurex cath placement (January 2018) with removal of pleurex (February 2018) and 3rd thoracentesis 2/14/18 with no significant findings on fluid removal. Moderate right pleural effusion stable by CXR 3/11/18. Awaiting chest CT today  - Last paracentesis was in January. Abd US 3/12/18 confirmed ongoing ascites (not tense at all). Will consider getting paracentesis this admit  - Levophed started this am due to hypotension post intubation.

## 2018-03-14 NOTE — ASSESSMENT & PLAN NOTE
Abnormal TFTs upon admit.  Unclear if secondary to illness, but has been seen before in the past (during previous hospitalization).    Continue LT4 125mcg daily.  Repeat TFTs in 4 weeks.

## 2018-03-14 NOTE — SUBJECTIVE & OBJECTIVE
Interval History: Intubated this morning for continued progressive decline last 12h. Low-grade temp in 100's noted. No positive cultures yet. Labs sent out this morning as planned and bronch with BAL performed for cultures.     Review of Systems   Unable to perform ROS: Intubated     Objective:     Vital Signs (Most Recent):  Temp: 99.1 °F (37.3 °C) (03/14/18 1400)  Pulse: (!) 132 (03/14/18 1510)  Resp: (!) 28 (03/14/18 1510)  BP: 114/63 (03/14/18 1500)  SpO2: 99 % (03/14/18 1510) Vital Signs (24h Range):  Temp:  [97.9 °F (36.6 °C)-99.1 °F (37.3 °C)] 99.1 °F (37.3 °C)  Pulse:  [108-135] 132  Resp:  [7-40] 28  SpO2:  [93 %-100 %] 99 %  BP: ()/(45-68) 114/63     Weight: 81 kg (178 lb 9.2 oz)  Body mass index is 27.97 kg/m².    Estimated Creatinine Clearance: 24 mL/min (A) (based on SCr of 4 mg/dL (H)).    Physical Exam   Constitutional: He appears well-developed and well-nourished. No distress.   Chronically ill appearing. Intubated.   HENT:   Head: Atraumatic.   Mouth/Throat: Oropharynx is clear and moist. No oropharyngeal exudate.   Eyes: Conjunctivae are normal. Pupils are equal, round, and reactive to light. No scleral icterus.   Neck: Neck supple.   Cardiovascular: Normal rate and regular rhythm.  Exam reveals no friction rub.    No murmur heard.  Pulmonary/Chest: No respiratory distress. He has no wheezes. He has rales. He exhibits no tenderness.   Intubated.   Abdominal: Soft. Bowel sounds are normal. He exhibits no distension. There is no tenderness. There is no rebound and no guarding.   Musculoskeletal: Normal range of motion. He exhibits no edema.   Lymphadenopathy:     He has no cervical adenopathy.   Neurological:   Intubated/sedated.   Skin: No rash noted. No erythema.       Significant Labs:   CBC:   Recent Labs  Lab 03/13/18  0441 03/13/18  2334 03/14/18  0425   WBC 2.92* 6.12 5.78   HGB 7.7* 7.6* 7.7*   HCT 23.8* 23.4* 23.9*   * 177 175     CMP:   Recent Labs  Lab 03/13/18  4082   03/13/18  2334 03/14/18  0425 03/14/18  0830 03/14/18  1215     < >  --  133* 137 137   K 5.0  < >  --  6.2* 4.4  4.4 4.7  4.7     < >  --  101 102 101   CO2 19*  < >  --  15* 17* 17*   *  < >  --  236* 299* 380*   BUN 35*  < >  --  43* 45* 46*   CREATININE 3.3*  < >  --  3.7* 3.9* 4.0*   CALCIUM 8.7  < >  --  8.3* 9.2 8.7   PROT 6.2  --  6.0 6.4  --   --    ALBUMIN 2.9*  --  2.7* 2.8*  --   --    BILITOT 0.9  --  1.1* 1.4*  --   --    ALKPHOS 303*  --  286* 259*  --   --    AST 18  --  21 28  --   --    ALT 9*  --  9* 9*  --   --    ANIONGAP 15  < >  --  17* 18* 19*   EGFRNONAA 21.5*  < >  --  18.7* 17.6* 17.1*   < > = values in this interval not displayed.    Significant Imaging: I have reviewed all pertinent imaging results/findings within the past 24 hours.     CT chest:  Status post heart transplantation with new multifocal subsegmental consolidation throughout both lungs.  Similar findings were present on the CT dated 12/12/2017 with partial improvement on 02/14/2018 therefore we doubt malignancy.  Differential considerations include multifocal infectious pneumonia, hemorrhage, or aspiration.  We consider pulmonary edema due to abnormal capillary permeability or cardiac decompensation less likely.  Lymphoma could present similarly although felt less likely due to improvement on CT of 2/14/2018.    Continued moderate sized incompletely dependent right pleural effusion with rounded consolidation in the right lower lobe which probably represents rounded atelectasis, similar to prior, new from 7/19/2016.    Redemonstration of mild scattered ascites in the upper abdomen.     Microbiology:  3/11 stool WBC negative  3/11 stool cx: negative  3/11 O&P negative  3/11 C.diff negative  3/11 Giardia antigen negative  3/11 Cryptosporidium antigen negative  3/11 rotavirus negative  3/11 norovirus negative  3/12 CMV quant negative  3/13 blood cx: NGTD  3/13 sputum cx: NGTD  3/13 aspergillus antigen  pending  3/13 fungitell pending  3/13 urine histo pending  3/13 urine blasto pending  3/13 crypto antigen pending  3/13 urine legionella pending  3/13 RVP pending  3/13 adenovirus stool EIA pending  3/13 Strongyloides ab pending  3/13 [place] stool pathogens panel pending  3/14 quantiferon pending

## 2018-03-14 NOTE — ASSESSMENT & PLAN NOTE
- baseline sCr 2.6-3.0 consistent with CKD stage IV  - oliguric CACHORRO; likely multifactorial in etiology: pre-renal from diarrhea + decreased renal perfusion from prograf vasoconstriction + prograf nephrotoxicity. Also cannot rule out septic ATN  - metabolic acidosis and acidemia improving with bicarb infusion. Mechanical ventilation will help with acidemia as well.   - hyperkalemia improved with shifting + kayexalate  - osullivan catheter placed with immediate return of 350cc clear urine  - agree with diuresis. If no improvement with lasix 80mg IV consider increasing dose to 120mg or even 160mg (given advanced CKD at baseline) and repeat up to q6h PRN  - continue bicarb gtt until pH > 7.2  - continue labs and ABG q4h  - low threshold for RRT if UOP declines or metabolic derangements worsen  - ordered UA, UPCR, and renal US. Will also spin urine.   - please call with any changes

## 2018-03-14 NOTE — ASSESSMENT & PLAN NOTE
- Creatinine 3.4 on admit, 3.6 today (baseline ~ 2.6-30)  - CVP 21 this am after trialysis placed.  - Will attempt aggressive diuresis.   - Neph following. May need CRRT.

## 2018-03-14 NOTE — ANESTHESIA PROCEDURE NOTES
Intubation    Diagnosis: Respiratory Failure   Patient location during procedure: ICU  Procedure start time: 3/14/2018 9:20 AM  Timeout: 3/14/2018 9:15 AM  Procedure end time: 3/14/2018 9:26 AM  Staffing  Anesthesiologist: KATALINA STEVENS JR  Resident/CRNA: AJ GARCIA  Performed: resident/CRNA   Anesthesiologist was present at the time of the procedure.  Preanesthetic Checklist  Completed: patient identified, surgical consent, pre-op evaluation, timeout performed, IV checked, risks and benefits discussed, monitors and equipment checked and anesthesia consent given  Intubation  Indication: respiratory failure  Pre-oxygenation. Induction: intravenous, mask ventilation: moderately difficult with oral airway.  Intubation: postinduction, laryngoscopy direct, Johnson 2.  Endotracheal Tube: oral, 8.0 mm ID, cuffed (inflated to minimal occlusive pressure)  Attempts: 1, Grade I - full view of cords  Complicating Factors: anterior larynx, large/floppy epiglottis, obesity and short neck (large amounts of fluid/saliva in oropharynx )  Tube secured at 25 cm at the teeth.  Findings post-intubation: bilateral breath sounds, positive ETCO2, atraumatic / condition of teeth unchanged  Position Confirmation: auscultation

## 2018-03-14 NOTE — PROGRESS NOTES
[facility]  Heart Transplant  Progress Note    Patient Name: Lv Crocker  MRN: [id number]  Admission Date: 3/11/2018  Hospital Length of Stay: 2 days  Attending Physician: Jose A Lloyd MD  Primary Care Provider: Philippe Mohr MD  Principal Problem:Acute respiratory failure with hypoxia    Subjective:     Interval History: Still acidotic this morning. Having trialysis placed.     Continuous Infusions:   norepinephrine bitartrate-D5W      custom IV infusion builder 50 mL/hr at 03/14/18 0800     Scheduled Meds:   aspirin  81 mg Oral Daily    azithromycin (ZITHROMAX) 500 mg IVPB  500 mg Intravenous Q24H    enoxaparin  30 mg Subcutaneous Daily    escitalopram oxalate  20 mg Oral Daily    etomidate        fentaNYL        gabapentin  600 mg Oral TID    ganciclovir (CYTOVENE) IVPB  200 mg Intravenous Q24H    insulin detemir U-100  5 Units Subcutaneous BID    levothyroxine  125 mcg Oral QAM    norepinephrine bitartrate-D5W        nortriptyline  25 mg Oral QHS    pantoprazole  40 mg Oral Daily    phenylephrine HCl in 0.9% NaCl        piperacillin-tazobactam (ZOSYN) IVPB  4.5 g Intravenous Q8H    posaconazole (NOXAFIL) 300 mg in dextrose 5% IVPB  300 mg Intravenous Daily    posaconazole  300 mg Oral BID    pravastatin  40 mg Oral Daily    predniSONE  2.5 mg Oral Daily    propofol        rocuronium        sodium chloride 0.9%  3 mL Intravenous Q8H    succinylcholine        tamsulosin  0.4 mg Oral QHS     PRN Meds:albuterol sulfate, alprazolam ODT, benzonatate, dextrose 50%, dextrose 50%, [COMPLETED] dextrose 50% **AND** dextrose 50% **AND** [COMPLETED] insulin regular, glucagon (human recombinant), glucose, glucose, insulin aspart U-100, ondansetron, promethazine-codeine 6.25-10 mg/5 ml    Review of patient's allergies indicates:   Allergen Reactions    No known drug allergies      Objective:     Vital Signs (Most Recent):  Temp: 97.9 °F (36.6 °C) (03/14/18  0701)  Pulse: (!) 114 (03/14/18 0932)  Resp: 11 (03/14/18 0932)  BP: (!) 90/51 (03/14/18 0932)  SpO2: 100 % (03/14/18 0932) Vital Signs (24h Range):  Temp:  [97.9 °F (36.6 °C)-100.8 °F (38.2 °C)] 97.9 °F (36.6 °C)  Pulse:  [108-134] 114  Resp:  [7-36] 11  SpO2:  [88 %-100 %] 100 %  BP: ()/(51-69) 90/51     Patient Vitals for the past 72 hrs (Last 3 readings):   Weight   03/13/18 1545 81 kg (178 lb 9.2 oz)   03/13/18 0400 78.6 kg (173 lb 4.5 oz)   03/12/18 0100 78.9 kg (174 lb)     Body mass index is 27.97 kg/m².      Intake/Output Summary (Last 24 hours) at 03/14/18 0947  Last data filed at 03/14/18 0800   Gross per 24 hour   Intake           1092.5 ml   Output                0 ml   Net           1092.5 ml       Hemodynamic Parameters:       Telemetry: ST    Physical Exam   Constitutional: He appears well-developed.   Appears ill   HENT:   Head: Normocephalic and atraumatic.   Eyes: Conjunctivae and EOM are normal. Pupils are equal, round, and reactive to light.   Neck: Normal range of motion. No JVD present. No thyromegaly present.   Cardiovascular: Regular rhythm.    Tachycardic   Pulmonary/Chest: Effort normal.   Breath sounds are decreased Bilaterally. Coarse as well.    Abdominal: Soft. Bowel sounds are normal.   + ascites   Musculoskeletal: Normal range of motion. He exhibits no edema.   Neurological: He is alert.   Skin: Skin is warm and dry. Capillary refill takes less than 2 seconds. There is pallor.   Psychiatric: He has a normal mood and affect. His behavior is normal. Judgment and thought content normal.       Significant Labs:  CBC:    Recent Labs  Lab 03/13/18  0441 03/13/18  2334 03/14/18  0425   WBC 2.92* 6.12 5.78   RBC 2.60* 2.54* 2.58*   HGB 7.7* 7.6* 7.7*   HCT 23.8* 23.4* 23.9*   * 177 175   MCV 92 92 93   MCH 29.6 29.9 29.8   MCHC 32.4 32.5 32.2     BNP:    Recent Labs  Lab 03/11/18  1922 03/13/18  0815   * 359*     CMP:    Recent Labs  Lab 03/13/18  0442 03/13/18  2310  03/13/18  2334 03/14/18  0425 03/14/18  0830   * 221*  --  236* 299*   CALCIUM 8.7 9.6  --  8.3* 9.2   ALBUMIN 2.9*  --  2.7* 2.8*  --    PROT 6.2  --  6.0 6.4  --     135*  --  133* 137   K 5.0 5.5*  --  6.2* 4.4  4.4   CO2 19* 14*  --  15* 17*    103  --  101 102   BUN 35* 38*  --  43* 45*   CREATININE 3.3* 3.5*  --  3.7* 3.9*   ALKPHOS 303*  --  286* 259*  --    ALT 9*  --  9* 9*  --    AST 18  --  21 28  --    BILITOT 0.9  --  1.1* 1.4*  --       Coagulation:   No results for input(s): PT, INR, APTT in the last 168 hours.  LDH:  No results for input(s): LDH in the last 72 hours.  Microbiology:  Microbiology Results (last 7 days)     Procedure Component Value Units Date/Time    Culture, Respiratory with Gram Stain [[id number]] Collected:  03/13/18 1441    Order Status:  Completed Specimen:  Respiratory from Sputum, Expectorated Updated:  03/14/18 0931     Respiratory Culture Normal respiratory hank     Gram Stain (Respiratory) <10 epithelial cells per low power field.     Gram Stain (Respiratory) Rare WBC's     Gram Stain (Respiratory) Moderate Gram positive cocci     Gram Stain (Respiratory) Many Gram negative rods    Blood culture [[id number]] Collected:  03/13/18 1701    Order Status:  Completed Specimen:  Blood from Peripheral, Forearm, Left Updated:  03/14/18 0515     Blood Culture, Routine No Growth to date    Blood culture [[id number]] Collected:  03/13/18 1637    Order Status:  Completed Specimen:  Blood from Peripheral, Antecubital, Left Updated:  03/14/18 0515     Blood Culture, Routine No Growth to date    Cryptococcal antigen [[id number]] Collected:  03/14/18 0425    Order Status:  Sent Specimen:  Blood from Blood Updated:  03/14/18 0451    Culture, Respiratory with Gram Stain [[id number]] Collected:  03/13/18 1701    Order Status:  Completed Specimen:  Respiratory from Sputum, Expectorated Updated:  03/13/18 222     Respiratory Culture Specimen inadequate - culture not performed      Gram Stain (Respiratory) >10epis/lfp and <than many WBC's     Gram Stain (Respiratory) Predominance of oropharyngeal hank. Please recollect.    Respiratory Viral Panel by PCR [facility]; Nasal Swab [[id number]] Collected:  03/13/18 1733    Order Status:  Sent Specimen:  Respiratory Updated:  03/13/18 1900    Stool culture **CANNOT BE ORDERED STAT** [[id number]] Collected:  03/11/18 1923    Order Status:  Completed Specimen:  Stool from Stool Updated:  03/13/18 1333     Stool Culture Nothing significant to date     Stool Culture No enteric hank    Clostridium difficile EIA [[id number]] Collected:  03/11/18 1923    Order Status:  Completed Specimen:  Stool from Stool Updated:  03/11/18 2355     C. diff Antigen Negative     C difficile Toxins A+B, EIA Negative     Comment: Testing not recommended for children <24 months old.       Stool culture [[id number]]     Order Status:  Completed Specimen:  Stool from Stool     Stool culture [[id number]]     Order Status:  Canceled Specimen:  Stool from Stool     E. coli 0157 antigen [[id number]] Collected:  03/11/18 1923    Order Status:  Canceled Specimen:  Stool from Stool Updated:  03/11/18 2001          I have reviewed all pertinent labs within the past 24 hours.    Estimated Creatinine Clearance: 24.6 mL/min (A) (based on SCr of 3.9 mg/dL (H)).    Diagnostic Results:  I have reviewed all pertinent imaging results/findings within the past 24 hours.    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 Most recently has had ongoing issues with his lungs, had gotten 2 thoracenteses, after which he underwent VATS 01/19/18 followed by pleurex catheter placement and effusion drainage 01/11/18, subsequently had it removed 02/07/18 after drainage had decreased despite multiple attempts to drain it.  Has been having significant coughing fits that result in him being near-syncopal at times, although difficult to say if he has passed out or not- patient most recently was admitted to the hospital for increased AGUILAR, coughing and pre-syncope 02/11-02/14/18 at [facility].   Patient was started on antibiotics for suspected bacterial infection, with some diarrhea, bronchitis, and possible pneumonia. Ct chest showed some pleural fluid collection and after talking with Dr. James, we arranged for him to receive a diagnostic tap with IR, from which the fluid collection demonstrated no growth or significant findings at all really. Cell counts do not appear to have been sent but will recheck. Patient states since his hospital stay, yesterday had a significant coughing fit again, after which he nearly passed out, is being seen in clinic today for this. Denies any cardiopulmonary complaints, no leg swelling, has some abdominal swelling, which is moderate for him. Denies significant shortness of breath with mild exertion, had 6MWT yesterday to see if he qualified for home O2, and his O2 sats actually improved after recovery from where he started initially. He and his wife admit he is in bed most days, not very active.     Mr. Crocker presented to the ED 3/11/18 with approximately 3 weeks of worsening diarrhea and 2-3 days of sinus pressure, drainage, and worsened cough.  He notes that he had a coughing fit last night and passed out, coughed throughout most of the night.  This also happened on 2/25/18.  He last saw Dr Ferreira in clinic on 2/20/18 where he was taken off myfortic and switched to cellcept (he hadn't been on cellcept because of leukopenia when they tried post-transplant).  Though his diarrhea had improved after his last discharge, he states it has increased now to 15x/day, clear/yellow/green, no fevers, no exacerbating foods per say.  He was taken back off the cellcept and placed back on myfortic this past week and has  not noticed immediate change in diarrhea. He also reports his baseline coughing fits havent improved and are minimally responsive to tessalon pearls.  Came on last night, he lost consciousness during a fit once which is relatively normal for him, and had two more during HPI.  He notes no sick contacts but does state he's had some URI symptoms as above.  His baseline vitals are usually -120 and BP 90s/60s-110s/70s.  He reports a history of intermittent diarrhea - did have Cdiff many years ago but this smells different.  No abdominal pain, no nausea, no vomiting.  No blood in diarrhea.  Appears last c-scope in 2015 with no evidence of infection or inflammatory processes.  He does report IBS which is different that this.       * Acute respiratory failure with hypoxia    -Acidotic this morning with increasing FiO2 requirements. Overnight.  -CXR looks worse as well.  -Will electively intubate.   -Appreciate PUL Cx. Plan for possible bronch today(please send aspergillus PCR and Legionella cx in addition to routine cx/path.   -Appreciate ID help as well. Started empiric vanc/zosyn/azithromycin for broad bacterial coverage and posaconazole for nodular appearing lesions in lungs   - Started induction GCV  until invasive CMV is excluded  - aspergillus antigen, fungitell, urine histo/blasto antigens, crypto antigen, RVP, urine legionella, and Quantiferon pending        Restrictive cardiomyopathy    - S/P thoracentesis X 2, followed by VATS/pleurex cath placement (January 2018) with removal of pleurex (February 2018) and 3rd thoracentesis 2/14/18 with no significant findings on fluid removal. Moderate right pleural effusion stable by CXR 3/11/18. Awaiting chest CT today  - Last paracentesis was in January. Abd US 3/12/18 confirmed ongoing ascites (not tense at all). Will consider getting paracentesis this admit  - Levophed started this am due to hypotension post intubation.         CKD (chronic kidney disease), stage IV     - Creatinine 3.4 on admit, 3.6 today (baseline ~ 2.6-30)  - CVP 21 this am after trialysis placed.  - Will attempt aggressive diuresis.   - Neph following. May need CRRT.         Heart transplanted    - TTE 3/12/18 showed LVEF 50-55% (but no substantial change when compared to previous echo), RV normal and IVC 8  - Had -ve DSE on 11/30/17  - Current immunosuppression: Pred 2.5 mg qd. Prograf on hold secondary to elevated level.         Diarrhea    - No improvement in watery stools despite changing Cellcept back to Myfortic.  - C. Diff -ve. Other stool studies pending  - CMV DNA and Norovirus PCR pending  - - Strongyloides ab, adenovirus EIA, and [place] stool PCR panel pending  - Consulted GI - plan upper and lower endoscopy once more stable.         Leukopenia    - WBC count stable. May require neupogen if trends down with current treatment.        Cough    - See resp failure above.        Type 1 diabetes mellitus with stage 3 chronic kidney disease    - Appreciate Endocrine's recs        Hypothyroidism due to Hashimoto's thyroiditis    - TSH low at 0.101 with normal FT4. Appreciate Endocrine's recs        Critical Care Time: 40 minutes     Critical care was time spent personally by me on the following activities: development of treatment plan with patient or surrogate and bedside caregivers, discussions with consultants, evaluation of patient's response to treatment, examination of patient, ordering and performing treatments and interventions, ordering and review of laboratory studies, ordering and review of radiographic studies, pulse oximetry, re-evaluation of patient's condition. This critical care time did not overlap with that of any other provider or involve time for any procedures.        Tim Mao NP  Heart Transplant  [facility]

## 2018-03-14 NOTE — SUBJECTIVE & OBJECTIVE
"Interval HPI:   Overnight events: diet advanced and bg elevated  Eatin%  Nausea: No  Hypoglycemia and intervention: No  Fever: No  TPN and/or TF: No  If yes, type of TF/TPN and rate: n/a    BP (!) 98/59 (BP Location: Right arm, Patient Position: Lying)   Pulse (!) 115   Temp 97.9 °F (36.6 °C) (Oral)   Resp 19   Ht 5' 7" (1.702 m)   Wt 81 kg (178 lb 9.2 oz)   SpO2 100%   BMI 27.97 kg/m²     Labs Reviewed and Include      Recent Labs  Lab 18  0425 18  0830   * 299*   CALCIUM 8.3* 9.2   ALBUMIN 2.8*  --    PROT 6.4  --    * 137   K 6.2* 4.4  4.4   CO2 15* 17*    102   BUN 43* 45*   CREATININE 3.7* 3.9*   ALKPHOS 259*  --    ALT 9*  --    AST 28  --    BILITOT 1.4*  --      Lab Results   Component Value Date    WBC 5.78 2018    HGB 7.7 (L) 2018    HCT 23.9 (L) 2018    MCV 93 2018     2018       Recent Labs  Lab 18  0443   TSH 0.101*   FREET4 1.33     Lab Results   Component Value Date    HGBA1C 6.9 (H) 2018       Nutritional status:   Body mass index is 27.97 kg/m².  Lab Results   Component Value Date    ALBUMIN 2.8 (L) 2018    ALBUMIN 2.7 (L) 2018    ALBUMIN 2.9 (L) 2018     Lab Results   Component Value Date    PREALBUMIN 18 (L) 2015    PREALBUMIN 19 (L) 2014    PREALBUMIN 24 2014       Estimated Creatinine Clearance: 24.6 mL/min (A) (based on SCr of 3.9 mg/dL (H)).    Accu-Checks  Recent Labs      18   0103  18   0735  18   0836  18   1348  18   1730  18   2128  18   0712  18   2101  18   0824   POCTGLUCOSE  171*  63*  76  96  134*  225*  157*  205*  244*       Current Medications and/or Treatments Impacting Glycemic Control  Immunotherapy:  Immunosuppressants     None        Steroids:   Hormones     Start     Stop Route Frequency Ordered    18 0900  predniSONE tablet 2.5 mg      -- Oral Daily 18 0023        Pressors:  "   Autonomic Drugs     Start     Stop Route Frequency Ordered    03/14/18 1045  norepinephrine 16 mg in dextrose 5 % 250 mL infusion     Question Answer Comment   Titrate by: (in mcg/kg/min) 0.02    Titrate interval: (in minutes) 2    Titrate to maintain: (MAP or SBP) MAP    Greater than: (in mmHg) 60    Maximum dose: (in mcg/kg/min) 3        -- IV Continuous 03/14/18 0937    03/14/18 0935  norepinephrine bitartrate-D5W 4 mg/250 mL (16 mcg/mL) infusion Soln     Comments:  Created by cabinet override    03/14 2144 03/14/18 0935        Hyperglycemia/Diabetes Medications: Antihyperglycemics     Start     Stop Route Frequency Ordered    03/14/18 2100  insulin detemir U-100 pen 7 Units      -- SubQ 2 times daily 03/14/18 1105    03/14/18 1215  insulin aspart U-100 pen 4 Units      -- SubQ 3 times daily with meals 03/14/18 1106    03/12/18 1318  insulin aspart U-100 pen 0-5 Units      -- SubQ Before meals & nightly PRN 03/12/18 1218

## 2018-03-14 NOTE — ASSESSMENT & PLAN NOTE
- TTE 3/12/18 showed LVEF 50-55% (but no substantial change when compared to previous echo), RV normal and IVC 8  - Had -ve DSE on 11/30/17  - Current immunosuppression: Pred 2.5 mg qd. Prograf on hold secondary to elevated level.

## 2018-03-14 NOTE — EICU
Called to bedside for trialysis catheter placement.  Time out completed with bedside team using proper pt identifiers.  Physician verification completed.  Consent confirmed with bedside team.   0809 - Trialysis catheter placed to (R) IJ per MD JOSIAH Neal using u/s guidance.  Good blood return noted per all ports.  Lines flush easily.  PCXR ordered.  Pt tolerated procedure well.

## 2018-03-14 NOTE — PROCEDURES
"Lv Crocker is a 42 y.o. male patient.    Temp: 97.9 °F (36.6 °C) (03/14/18 0701)  Pulse: (!) 116 (03/14/18 1152)  Resp: (!) 23 (03/14/18 1152)  BP: (!) 95/54 (03/14/18 1146)  SpO2: 100 % (03/14/18 1152)  Weight: 81 kg (178 lb 9.2 oz) (03/13/18 1545)  Height: 5' 7" (170.2 cm) (03/13/18 1545)       Procedures     [facility]  Bronchoscopy   Procedure Note    SUMMARY     Date of Procedure: 3/14/2018    Procedure: Bronchoscopy, Diagnostic  Bronchoalveolar lavage, BAL    Provider: Geri Stringer MD     Pre-Procedure Diagnosis: PNA    Post-Procedure Diagnosis: as above    Indication: hypoxic resp failure    Anesthesia: propofol    Description of the Findings of the Procedure:     Patient's wife was consented for the procedure with all risk and benefit of the procedure explained in detail.  Patient's wife was given the opportunity to ask questions and all concerns were answered.  The bronchocope was inserted into the main airway via the 8.0 ETT. An anatomical survey was done of the main airways and the subsegmental bronchus. Some secretions were noted in the RLL.   A bronchialalveolar lavage was performed using two 60 ml aliquots of normal saline instilled into the airways then aspirated back. 50 ml blood tinged returned back on BAL.       Complications: tolerated the procedure well    Estimated Blood Loss (EBL): 0           Specimens: see orders       Condition: stable        Disposition: ICU - intubated and hemodynamically stable.    Attestation: I performed the procedure.      Geri Stringer  3/14/2018  "

## 2018-03-14 NOTE — CONSULTS
[facility]  Nephrology  Consult Note    Patient Name: Lv Crocker  MRN: [id number]  Admission Date: 3/11/2018  Hospital Length of Stay: 2 days  Attending Provider: Jose A Lloyd MD   Primary Care Physician: Philippe Mohr MD  Principal Problem:Acute respiratory failure with hypoxia    Inpatient consult to Nephrology  Consult performed by: MARILYN NEIL  Consult ordered by: SONIA VERA  Reason for consult: CACHRORO in setting of CKD stage 4        Subjective:     HPI: Mr. Crocker is a 41 yo WM with T1DM, Hashimoto thyroiditis, h/o OHTx 11/2014, and CKD stage 4 who was admitted on 3/11/18 for persistent diarrhea. He reported a 3 week history of diarrhea up to 15x/day. Associated symptoms including URI symptoms, coughing fits, and coughing syncope. Prograf level was 14.3. His post-heart transplant course has been complicated by AMR 4/2015, CMV, post-transplant restrictive cardiomyopathy, recurrent pleural effusions/ascites requiring monthly thoracenteses/paracenteses, VATS 1/11/18 with Pleur-X catheter (now removed), and CKD stage 4 with baseline sCr 2.6-3.0. Baseline -120s and baseline BP 90s-110s/60-70s. Upon admission he was started on IVF and diarrhea workup was initiated. On 3/12 he was noted to have decreased UOP despite fluids; NS was increased to 100cc/hr. He was scheduled for a colonoscopy on 3/13 however procedure was cancelled due to hypoxia and fever. He was transferred to the ICU for closer monitoring. He continued to have oliguria overnight. Bladder scan revealed 200cc of urine but patient refused osullivan catheter placement. Wife reports that patient always has a hard time urinating again after osullivan catheters are placed/removed so he refuses them. He remained hypoxic despite FiO2 70% and ABG revealed combined respiratory and metabolic acidosis with pH 7.17. He was started on BiPAP as well as a bicarb infusion at 50cc/hr. ABG with mild improvement. AM labs  "revealed K 6.2 and he was shifted and given kayexalate.Trialysis catheter was placed this morning and he subsequently developed hypotension and was started on levophed. He was intubated later this morning. Nephrology consulted for CACHORRO. All history obtained by primary team and chart review as patient was intubated/sedated on exam. Consent for dialysis obtained by patient's wife and placed in chart. Of note, both of patient's parents were on dialysis.     Past Medical History:   Diagnosis Date    Arthritis     Ascites     Thought to be 2/2 heart tpx; liver bx 3/31/17 w/o significant fibrosis    Cardiomyopathy     Depression     Controlled "for the most part" on Lexipro    Encounter for blood transfusion     during transplant    GERD (gastroesophageal reflux disease)     Hashimoto's disease     History of CHF (congestive heart failure)     Previously EF 20% (prior to heart transplant); last EF 60%, PAP 41 as of 12/12/17; throught to be 2/2 genetics & DM1    History of coronary artery disease     H/o Coronary artery disease; resolved since heart transplant 2014    History of myocardial infarction     h/o MI x3 prior to heart transplant    HLD (hyperlipidemia) 6/11/2015    Hypoglycemia unawareness in type 1 diabetes mellitus     Hypothyroid     Initially hyperthyroid 2/2 Hoshimoto's thyroiditis; now on levothyroxine 150 mcg qd    Pleural effusion due to another disorder     Thought to be 2/2 heart tpx; s/p PleuRx catheter placement and removal after 1-2 months    Pulmonary hypertension assoc with unclear multi-factorial mechanisms 12/12/2017    PAP 41 (EF 60%) on ECHO 12/12/17    Syncope 6/30/2015    Type I diabetes mellitus, well controlled     Well controlled; Dx'd @5y/o; on N insulin 20U BID & Humalog 10U w/meals +SSI; Glu 89 11/9/17; A1c 7.2% 4/5/17    Unspecified essential hypertension 05/29/2014    Well controlled; Last /57 on 11/9/17       Past Surgical History:   Procedure Laterality Date "    CARDIAC CATHETERIZATION      CARDIAC SURGERY      CHOLECYSTECTOMY      COLONOSCOPY N/A 3/13/2018    Procedure: COLONOSCOPY;  Surgeon: Tim Spencer MD;  Location: [facility] ([address] [place] [address]);  Service: Endoscopy;  Laterality: N/A;    CORONARY ANGIOPLASTY      FOOT SPLIT TRANSFER OF THE POSTERIOR TIBIALIS TENDON PROCEDURE      HEART TRANSPLANT  2014    KNEE SURGERY      PleuRx catheter placement  01/11/2018    Plan for removal after 1-2 months by Dr. James in cardiothoracic surgery    s/p oht  November 2014    stent placemnet         Review of patient's allergies indicates:   Allergen Reactions    No known drug allergies      Current Facility-Administered Medications   Medication Frequency    albuterol nebulizer solution 2.5 mg Q4H PRN    alprazolam ODT dissolvable tablet 1 mg TID PRN    [START ON 3/15/2018] aspirin EC tablet 81 mg Daily    azithromycin (ZITHROMAX) 500 mg in sodium chloride 0.9% 250 mL IVPB Q24H    benzonatate capsule 200 mg TID PRN    dextrose 50% injection 12.5 g PRN    dextrose 50% injection 25 g PRN    dextrose 50% injection 25 g PRN    [START ON 3/15/2018] escitalopram oxalate tablet 20 mg Daily    famotidine (PF) injection 20 mg Daily    furosemide (LASIX) 500 mg infusion (cont: 10 mg/mL) Continuous    furosemide injection 80 mg Once    gabapentin capsule 600 mg TID    ganciclovir (CYTOVENE) 200 mg in sodium chloride 0.9% 100 mL IVPB Q24H    glucagon (human recombinant) injection 1 mg PRN    glucose chewable tablet 16 g PRN    glucose chewable tablet 24 g PRN    heparin (porcine) injection 5,000 Units Q8H    insulin aspart U-100 pen 0-5 Units QID (AC + HS) PRN    insulin aspart U-100 pen 4 Units TIDWM    insulin detemir U-100 pen 7 Units BID    [START ON 3/15/2018] levothyroxine tablet 125 mcg QAM    norepinephrine 16 mg in dextrose 5 % 250 mL infusion Continuous    norepinephrine bitartrate-D5W 4 mg/250 mL (16 mcg/mL) infusion Soln     nortriptyline  capsule 25 mg QHS    ondansetron disintegrating tablet 8 mg Q8H PRN    piperacillin-tazobactam 4.5 g in sodium chloride 0.9% 100 mL IVPB (ready to mix system) Q8H    posaconazole 300 mg in dextrose 5 % 150 mL infusion Daily    posaconazole tablet 300 mg BID    predniSONE tablet 2.5 mg Daily    promethazine-codeine 6.25-10 mg/5 ml syrup 5 mL Q4H PRN    propofol (DIPRIVAN) 10 mg/mL infusion Continuous    sodium chloride 0.45% 1,000 mL with sodium bicarbonate 1 mEq/mL (8.4 %) 75 mEq infusion Continuous    sodium chloride 0.9% flush 3 mL Q8H    tamsulosin 24 hr capsule 0.4 mg QHS     Family History     Problem Relation (Age of Onset)    Cancer Brother (50)    Diabetes Mother, Father, Brother, Son, Sister, Maternal Uncle, Paternal Aunt, Maternal Grandmother, Maternal Grandfather    Heart disease Mother, Brother    Hypertension Mother, Father, Brother    Kidney disease Mother, Father    Stroke Father, Brother    Vision loss Brother        Social History Main Topics    Smoking status: Never Smoker    Smokeless tobacco: Never Used    Alcohol use No    Drug use: No    Sexual activity: Yes     Partners: Female     Review of Systems   Unable to perform ROS: Intubated     Objective:     Vital Signs (Most Recent):  Temp: 97.9 °F (36.6 °C) (03/14/18 0701)  Pulse: (!) 115 (03/14/18 1045)  Resp: 19 (03/14/18 1045)  BP: (!) 98/59 (03/14/18 1045)  SpO2: 100 % (03/14/18 1045)  O2 Device (Oxygen Therapy): ventilator (03/14/18 1045) Vital Signs (24h Range):  Temp:  [97.9 °F (36.6 °C)-100.8 °F (38.2 °C)] 97.9 °F (36.6 °C)  Pulse:  [108-134] 115  Resp:  [7-40] 19  SpO2:  [88 %-100 %] 100 %  BP: ()/(45-69) 98/59     Weight: 81 kg (178 lb 9.2 oz) (03/13/18 1545)  Body mass index is 27.97 kg/m².  Body surface area is 1.96 meters squared.    I/O last 3 completed shifts:  In: 2277.5 [P.O.:440; I.V.:1287.5; IV Piggyback:550]  Out: 350 [Urine:350]    Physical Exam   Constitutional: He appears well-developed and  well-nourished. He is sedated and intubated.   HENT:   Head: Normocephalic and atraumatic.   Neck: Neck supple. No thyromegaly present.   Cardiovascular: Normal rate and regular rhythm.    Pulmonary/Chest: He is intubated. He has rhonchi. He has rales.   Abdominal: Soft. He exhibits no distension.   Musculoskeletal: He exhibits no edema or deformity.   Lymphadenopathy:     He has no cervical adenopathy.        Right: No supraclavicular adenopathy present.        Left: No supraclavicular adenopathy present.   Skin: Skin is warm and dry. He is not diaphoretic.       Significant Labs:  ABGs:   Recent Labs  Lab 03/14/18 0446   PH 7.218*   PCO2 43.4   HCO3 17.7*   POCSATURATED 94*   BE -10     CBC:   Recent Labs  Lab 03/14/18 0425   WBC 5.78   RBC 2.58*   HGB 7.7*   HCT 23.9*      MCV 93   MCH 29.8   MCHC 32.2     CMP:   Recent Labs  Lab 03/14/18 0425 03/14/18  0830   * 299*   CALCIUM 8.3* 9.2   ALBUMIN 2.8*  --    PROT 6.4  --    * 137   K 6.2* 4.4  4.4   CO2 15* 17*    102   BUN 43* 45*   CREATININE 3.7* 3.9*   ALKPHOS 259*  --    ALT 9*  --    AST 28  --    BILITOT 1.4*  --      All labs within the past 24 hours have been reviewed.    Significant Imaging:  Labs: Reviewed  X-Ray: Reviewed    Assessment/Plan:     Acute renal failure superimposed on stage 4 chronic kidney disease    - baseline sCr 2.6-3.0 consistent with CKD stage IV  - oliguric CACHORRO; likely multifactorial in etiology: pre-renal from diarrhea + decreased renal perfusion from prograf vasoconstriction + prograf nephrotoxicity. Also cannot rule out septic ATN  - metabolic acidosis and acidemia improving with bicarb infusion. Mechanical ventilation will help with acidemia as well.   - hyperkalemia improved with shifting + kayexalate  - osullivan catheter placed with immediate return of 350cc clear urine  - agree with diuresis. If no improvement with lasix 80mg IV consider increasing dose to 120mg or even 160mg (given advanced CKD at  baseline) and repeat up to q6h PRN  - continue bicarb gtt until pH > 7.2  - continue labs and ABG q4h  - low threshold for RRT if UOP declines or metabolic derangements worsen  - ordered UA, UPCR, and renal US. Will also spin urine.   - please call with any changes            Amairani Alegria, PGY-5  Nephrology Fellow  [facility]  Pager: [phone]    Patient seen and examined with Dr Alegria;   I have reviewed and agree with assessment and plan

## 2018-03-14 NOTE — PROGRESS NOTES
Bronchoscopy performed by Dr. Watson. Samples were obtained and sent to lab. Patient on documented ventilator settings. Will continue to monitor.

## 2018-03-14 NOTE — ASSESSMENT & PLAN NOTE
-Acidotic this morning with increasing FiO2 requirements. Overnight.  -CXR looks worse as well.  -Will electively intubate.   -Appreciate PUL Cx. Plan for possible bronch today.

## 2018-03-14 NOTE — PROGRESS NOTES
Patient intubated with size 8.0 ETT secured at the 28 lip (pulled out to the 25 lip per MD order) on documented settings.  Will continue to monitor.       03/14/18 0932   PRE-TX-O2-ETCO2   Oxygen Concentration (%) 100   SpO2 100 %   Pulse (!) 114   Resp 11   BP (!) 90/51       Airway - Non-Surgical 03/14/18 0930 Endotracheal Tube   Placement Date/Time: 03/14/18 0930   Method of Intubation: Direct laryngoscopy  Airway Device: Endotracheal Tube  Airway Device Size: 8.0  Placement Verified By: Chest X-ray  Findings Post-Intubation: Positive EtCO2  Depth of Insertion (cm): 28  Secur...   Secured at 25 cm   Measured At Lips   Secured Location Right   Secured by Commercial tube young   Bite Block none   Site Condition Cool;Dry   Status Intact;Secured;Patent   Site Assessment Clean;Dry;No bleeding;No drainage   Vent Select   Conventional Vent Y   Ventilator Initiated Yes   $ Ventilator Initial 1   Charged w/in last 24h YES   Preset Conventional Ventilator Settings   Vent ID [id number]   Vent Type    Ventilation Type VC+   Vent Mode A/C   Humidity HME   Set Rate 16 bmp   Vt Set 450 mL   PEEP/CPAP 5 cmH20   Pressure Support 0 cmH20   Waveform RAMP   Peak Flow 60 L/min   Set Inspiratory Pressure 0 cmH20   Insp Time 0.9 Sec(s)   Plateau Set/Insp. Hold (sec) 0   Insp Rise Time  0 %   Trigger Sensitivity Flow/I-Trigger 3 L/min   P High 0 cm H2O   P Low 0 cm H2O   T High 0 sec   T Low 0 sec   Patient Ventilator Parameters   Resp Rate Total 16 br/min   Peak Airway Pressure 47 cmH2O   Mean Airway Pressure 15 cmH20   Plateau Pressure 0 cmH20   Exhaled Vt 446 mL   Total Ve 7.18 mL   Spont Ve 0 L   I:E Ratio Measured 1:3.2   Conventional Ventilator Alarms   Alarms On Y   Ve High Alarm 23 L/min   Ve Low Alarm 3 L/min   Resp Rate High Alarm 45 br/min   Press High Alarm 60 cmH2O   Apnea Rate 16   Apnea Volume (mL) 460 mL   Apnea Oxygen Concentration  100   Apnea Flow Rate (L/min) 60   T Apnea 20 sec(s)   Ready to Wean/Extubation Screen    FIO2<=50 (chart decimal) (!) 1   MV<16L (chart vol.) 7.18   PEEP <=8 (chart #) 5   Ready to Wean Parameters   F/VT Ratio<105 (RSBI) (!) 24.66

## 2018-03-14 NOTE — SUBJECTIVE & OBJECTIVE
Interval History: Still acidotic this morning. Having trialysis placed.     Continuous Infusions:   norepinephrine bitartrate-D5W      custom IV infusion builder 50 mL/hr at 03/14/18 0800     Scheduled Meds:   aspirin  81 mg Oral Daily    azithromycin (ZITHROMAX) 500 mg IVPB  500 mg Intravenous Q24H    enoxaparin  30 mg Subcutaneous Daily    escitalopram oxalate  20 mg Oral Daily    etomidate        fentaNYL        gabapentin  600 mg Oral TID    ganciclovir (CYTOVENE) IVPB  200 mg Intravenous Q24H    insulin detemir U-100  5 Units Subcutaneous BID    levothyroxine  125 mcg Oral QAM    norepinephrine bitartrate-D5W        nortriptyline  25 mg Oral QHS    pantoprazole  40 mg Oral Daily    phenylephrine HCl in 0.9% NaCl        piperacillin-tazobactam (ZOSYN) IVPB  4.5 g Intravenous Q8H    posaconazole (NOXAFIL) 300 mg in dextrose 5% IVPB  300 mg Intravenous Daily    posaconazole  300 mg Oral BID    pravastatin  40 mg Oral Daily    predniSONE  2.5 mg Oral Daily    propofol        rocuronium        sodium chloride 0.9%  3 mL Intravenous Q8H    succinylcholine        tamsulosin  0.4 mg Oral QHS     PRN Meds:albuterol sulfate, alprazolam ODT, benzonatate, dextrose 50%, dextrose 50%, [COMPLETED] dextrose 50% **AND** dextrose 50% **AND** [COMPLETED] insulin regular, glucagon (human recombinant), glucose, glucose, insulin aspart U-100, ondansetron, promethazine-codeine 6.25-10 mg/5 ml    Review of patient's allergies indicates:   Allergen Reactions    No known drug allergies      Objective:     Vital Signs (Most Recent):  Temp: 97.9 °F (36.6 °C) (03/14/18 0701)  Pulse: (!) 114 (03/14/18 0932)  Resp: 11 (03/14/18 0932)  BP: (!) 90/51 (03/14/18 0932)  SpO2: 100 % (03/14/18 0932) Vital Signs (24h Range):  Temp:  [97.9 °F (36.6 °C)-100.8 °F (38.2 °C)] 97.9 °F (36.6 °C)  Pulse:  [108-134] 114  Resp:  [7-36] 11  SpO2:  [88 %-100 %] 100 %  BP: ()/(51-69) 90/51     Patient Vitals for the past 72 hrs  (Last 3 readings):   Weight   03/13/18 1545 81 kg (178 lb 9.2 oz)   03/13/18 0400 78.6 kg (173 lb 4.5 oz)   03/12/18 0100 78.9 kg (174 lb)     Body mass index is 27.97 kg/m².      Intake/Output Summary (Last 24 hours) at 03/14/18 0947  Last data filed at 03/14/18 0800   Gross per 24 hour   Intake           1092.5 ml   Output                0 ml   Net           1092.5 ml       Hemodynamic Parameters:       Telemetry: ST    Physical Exam   Constitutional: He appears well-developed.   Appears ill   HENT:   Head: Normocephalic and atraumatic.   Eyes: Conjunctivae and EOM are normal. Pupils are equal, round, and reactive to light.   Neck: Normal range of motion. No JVD present. No thyromegaly present.   Cardiovascular: Regular rhythm.    Tachycardic   Pulmonary/Chest: Effort normal.   Breath sounds are decreased Bilaterally. Coarse as well.    Abdominal: Soft. Bowel sounds are normal.   + ascites   Musculoskeletal: Normal range of motion. He exhibits no edema.   Neurological: He is alert.   Skin: Skin is warm and dry. Capillary refill takes less than 2 seconds. There is pallor.   Psychiatric: He has a normal mood and affect. His behavior is normal. Judgment and thought content normal.       Significant Labs:  CBC:    Recent Labs  Lab 03/13/18  0441 03/13/18 2334 03/14/18  0425   WBC 2.92* 6.12 5.78   RBC 2.60* 2.54* 2.58*   HGB 7.7* 7.6* 7.7*   HCT 23.8* 23.4* 23.9*   * 177 175   MCV 92 92 93   MCH 29.6 29.9 29.8   MCHC 32.4 32.5 32.2     BNP:    Recent Labs  Lab 03/11/18  1922 03/13/18  0815   * 359*     CMP:    Recent Labs  Lab 03/13/18  0442 03/13/18  2310 03/13/18  2334 03/14/18  0425 03/14/18  0830   * 221*  --  236* 299*   CALCIUM 8.7 9.6  --  8.3* 9.2   ALBUMIN 2.9*  --  2.7* 2.8*  --    PROT 6.2  --  6.0 6.4  --     135*  --  133* 137   K 5.0 5.5*  --  6.2* 4.4  4.4   CO2 19* 14*  --  15* 17*    103  --  101 102   BUN 35* 38*  --  43* 45*   CREATININE 3.3* 3.5*  --  3.7* 3.9*    ALKPHOS 303*  --  286* 259*  --    ALT 9*  --  9* 9*  --    AST 18  --  21 28  --    BILITOT 0.9  --  1.1* 1.4*  --       Coagulation:   No results for input(s): PT, INR, APTT in the last 168 hours.  LDH:  No results for input(s): LDH in the last 72 hours.  Microbiology:  Microbiology Results (last 7 days)     Procedure Component Value Units Date/Time    Culture, Respiratory with Gram Stain [[id number]] Collected:  03/13/18 1441    Order Status:  Completed Specimen:  Respiratory from Sputum, Expectorated Updated:  03/14/18 0931     Respiratory Culture Normal respiratory hank     Gram Stain (Respiratory) <10 epithelial cells per low power field.     Gram Stain (Respiratory) Rare WBC's     Gram Stain (Respiratory) Moderate Gram positive cocci     Gram Stain (Respiratory) Many Gram negative rods    Blood culture [[id number]] Collected:  03/13/18 1701    Order Status:  Completed Specimen:  Blood from Peripheral, Forearm, Left Updated:  03/14/18 0515     Blood Culture, Routine No Growth to date    Blood culture [[id number]] Collected:  03/13/18 1637    Order Status:  Completed Specimen:  Blood from Peripheral, Antecubital, Left Updated:  03/14/18 0515     Blood Culture, Routine No Growth to date    Cryptococcal antigen [[id number]] Collected:  03/14/18 0425    Order Status:  Sent Specimen:  Blood from Blood Updated:  03/14/18 0451    Culture, Respiratory with Gram Stain [[id number]] Collected:  03/13/18 1701    Order Status:  Completed Specimen:  Respiratory from Sputum, Expectorated Updated:  03/13/18 2224     Respiratory Culture Specimen inadequate - culture not performed     Gram Stain (Respiratory) >10epis/lfp and <than many WBC's     Gram Stain (Respiratory) Predominance of oropharyngeal hank. Please recollect.    Respiratory Viral Panel by PCR [facility]; Nasal Swab [[id number]] Collected:  03/13/18 1733    Order Status:  Sent Specimen:  Respiratory Updated:  03/13/18 1900    Stool culture **CANNOT BE ORDERED STAT**  [[id number]] Collected:  03/11/18 1923    Order Status:  Completed Specimen:  Stool from Stool Updated:  03/13/18 1333     Stool Culture Nothing significant to date     Stool Culture No enteric hank    Clostridium difficile EIA [[id number]] Collected:  03/11/18 1923    Order Status:  Completed Specimen:  Stool from Stool Updated:  03/11/18 2355     C. diff Antigen Negative     C difficile Toxins A+B, EIA Negative     Comment: Testing not recommended for children <24 months old.       Stool culture [[id number]]     Order Status:  Completed Specimen:  Stool from Stool     Stool culture [[id number]]     Order Status:  Canceled Specimen:  Stool from Stool     E. coli 0157 antigen [[id number]] Collected:  03/11/18 1923    Order Status:  Canceled Specimen:  Stool from Stool Updated:  03/11/18 2001          I have reviewed all pertinent labs within the past 24 hours.    Estimated Creatinine Clearance: 24.6 mL/min (A) (based on SCr of 3.9 mg/dL (H)).    Diagnostic Results:  I have reviewed all pertinent imaging results/findings within the past 24 hours.

## 2018-03-14 NOTE — PROGRESS NOTES
[facility]  Infectious Disease  Progress Note    Patient Name: Lv Crocker  MRN: [id number]  Admission Date: 3/11/2018  Length of Stay: 2 days  Attending Physician: Jose A Lloyd MD  Primary Care Provider: Philippe Mohr MD    Isolation Status: No active isolations  Assessment/Plan:      * Acute respiratory failure with hypoxia    41yo man w/a history of DM1, Hashimoto's thyroiditis, and ICM (s/p OHT 11/18/2014, CMV D+/R+, steroid induction, on maintenance tacro/MMF/pred; c/b early hemorrhagic tamponade requiring washout, delayed closure, and pericardial window and subsequent AF w/RVR, and CACHORRO; late course c/b ABMR 4/2015 s/p rituxan, PLEX, and IVIG; subsequent C.diff/CMV reactivation, restrictive cardiomyopathy with recurrent pleural effusions s/p VATS/pleurex catheter 1/2018 with removal 2/2018 and ascites requiring repeated LVP, and CKD) who was admitted on 3/11/2018 with ~3wks of worsening profuse non-bloody diarrhea, associated cramping abdominal pain, N/V, and a week of acute on chronic cough, SOB, and fevers and has been found to have multifocal pneumonia on CT chest. He decompensated on 3/13 with acute hypoxic respiratory failure requiring intubation and is critically ill. Differential for his cough/SOB and diarrhea includes some unifying diagnoses (Legionella, disseminated histoplasmosis, disseminated CMV infection, Strongyloidiasis) but it is more likely that he has 2 processes driving his pulmonary and GI symptoms (such as a bacterial OI or IFI causing his multifocal pneumonia with accompanying GI illness such as CMV).     - will continue empiric vanc/zosyn/azithromycin, posaconazole, and IV GCV induction for broad coverage  - have sent noninvasive workup for pulmonary issues including: aspergillus antigen, fungitell, urine histo/blasto antigens, crypto antigen, RVP, urine legionella, and Quantiferon  - have sent Strongyloides ab (of note, O&P negative), adenovirus EIA, and  [place] stool PCR panel to complete noninvasive workup of diarrhea  - await pending blood, sputum, and stool cx   - await pending BAL samples obtained today  - flex sig for samples to exclude CMV invasive disease or other pathogens would be helpful but patient is not stable enough for this procedure currently; will empirically treat CMV for now and reassess later            Anticipated Disposition: pending improvement    Thank you for your consult. I will follow-up with patient. Please contact us if you have any additional questions.     Maribell Eric MD  Transplant ID Attending  [phone]    Maribell Eric MD  Infectious Disease  [facility]    Subjective:     Principal Problem:Acute respiratory failure with hypoxia    HPI: No notes on file  Interval History: Intubated this morning for continued progressive decline last 12h. Low-grade temp in 100's noted. No positive cultures yet. Labs sent out this morning as planned and bronch with BAL performed for cultures.     Review of Systems   Unable to perform ROS: Intubated     Objective:     Vital Signs (Most Recent):  Temp: 99.1 °F (37.3 °C) (03/14/18 1400)  Pulse: (!) 132 (03/14/18 1510)  Resp: (!) 28 (03/14/18 1510)  BP: 114/63 (03/14/18 1500)  SpO2: 99 % (03/14/18 1510) Vital Signs (24h Range):  Temp:  [97.9 °F (36.6 °C)-99.1 °F (37.3 °C)] 99.1 °F (37.3 °C)  Pulse:  [108-135] 132  Resp:  [7-40] 28  SpO2:  [93 %-100 %] 99 %  BP: ()/(45-68) 114/63     Weight: 81 kg (178 lb 9.2 oz)  Body mass index is 27.97 kg/m².    Estimated Creatinine Clearance: 24 mL/min (A) (based on SCr of 4 mg/dL (H)).    Physical Exam   Constitutional: He appears well-developed and well-nourished. No distress.   Chronically ill appearing. Intubated.   HENT:   Head: Atraumatic.   Mouth/Throat: Oropharynx is clear and moist. No oropharyngeal exudate.   Eyes: Conjunctivae are normal. Pupils are equal, round, and reactive to light. No scleral icterus.   Neck: Neck supple.    Cardiovascular: Normal rate and regular rhythm.  Exam reveals no friction rub.    No murmur heard.  Pulmonary/Chest: No respiratory distress. He has no wheezes. He has rales. He exhibits no tenderness.   Intubated.   Abdominal: Soft. Bowel sounds are normal. He exhibits no distension. There is no tenderness. There is no rebound and no guarding.   Musculoskeletal: Normal range of motion. He exhibits no edema.   Lymphadenopathy:     He has no cervical adenopathy.   Neurological:   Intubated/sedated.   Skin: No rash noted. No erythema.       Significant Labs:   CBC:   Recent Labs  Lab 03/13/18  0441 03/13/18  2334 03/14/18  0425   WBC 2.92* 6.12 5.78   HGB 7.7* 7.6* 7.7*   HCT 23.8* 23.4* 23.9*   * 177 175     CMP:   Recent Labs  Lab 03/13/18  0442  03/13/18  2334 03/14/18  0425 03/14/18  0830 03/14/18  1215     < >  --  133* 137 137   K 5.0  < >  --  6.2* 4.4  4.4 4.7  4.7     < >  --  101 102 101   CO2 19*  < >  --  15* 17* 17*   *  < >  --  236* 299* 380*   BUN 35*  < >  --  43* 45* 46*   CREATININE 3.3*  < >  --  3.7* 3.9* 4.0*   CALCIUM 8.7  < >  --  8.3* 9.2 8.7   PROT 6.2  --  6.0 6.4  --   --    ALBUMIN 2.9*  --  2.7* 2.8*  --   --    BILITOT 0.9  --  1.1* 1.4*  --   --    ALKPHOS 303*  --  286* 259*  --   --    AST 18  --  21 28  --   --    ALT 9*  --  9* 9*  --   --    ANIONGAP 15  < >  --  17* 18* 19*   EGFRNONAA 21.5*  < >  --  18.7* 17.6* 17.1*   < > = values in this interval not displayed.    Significant Imaging: I have reviewed all pertinent imaging results/findings within the past 24 hours.     CT chest:  Status post heart transplantation with new multifocal subsegmental consolidation throughout both lungs.  Similar findings were present on the CT dated 12/12/2017 with partial improvement on 02/14/2018 therefore we doubt malignancy.  Differential considerations include multifocal infectious pneumonia, hemorrhage, or aspiration.  We consider pulmonary edema due to abnormal  capillary permeability or cardiac decompensation less likely.  Lymphoma could present similarly although felt less likely due to improvement on CT of 2/14/2018.    Continued moderate sized incompletely dependent right pleural effusion with rounded consolidation in the right lower lobe which probably represents rounded atelectasis, similar to prior, new from 7/19/2016.    Redemonstration of mild scattered ascites in the upper abdomen.     Microbiology:  3/11 stool WBC negative  3/11 stool cx: negative  3/11 O&P negative  3/11 C.diff negative  3/11 Giardia antigen negative  3/11 Cryptosporidium antigen negative  3/11 rotavirus negative  3/11 norovirus negative  3/12 CMV quant negative  3/13 blood cx: NGTD  3/13 sputum cx: NGTD  3/13 aspergillus antigen pending  3/13 fungitell pending  3/13 urine histo pending  3/13 urine blasto pending  3/13 crypto antigen pending  3/13 urine legionella pending  3/13 RVP pending  3/13 adenovirus stool EIA pending  3/13 Strongyloides ab pending  3/13 [place] stool pathogens panel pending  3/14 quantiferon pending

## 2018-03-14 NOTE — ASSESSMENT & PLAN NOTE
- No improvement in watery stools despite changing Cellcept back to Myfortic.  - C. Diff -ve. Other stool studies pending  - CMV DNA and Norovirus PCR pending  - Consulted GI - plan upper and lower endoscopy once more stable.

## 2018-03-14 NOTE — PROGRESS NOTES
Full consult note to follow. Suspected etiology is CNI nephrotoxicity induced by diarrhea.   Patient reportedly making urine. Bicarb gtt started last night.  Note with bladder scan volume of 200cc.  Recommend osullivan catheter placement now for strict I/Os.   Continue bicarb infusion. Agree with kayexalate. Lasix IV PRN. Improve glucose control.   Labs q4h. Low threshold for RRT though if truly making urine, would like to see if we can manage him medically.  Will spin urine.  Renal US.       Amairani Alegria, PGY-5  Nephrology Fellow  [facility]  Pager: [phone]

## 2018-03-14 NOTE — SUBJECTIVE & OBJECTIVE
"Past Medical History:   Diagnosis Date    Arthritis     Ascites     Thought to be 2/2 heart tpx; liver bx 3/31/17 w/o significant fibrosis    Cardiomyopathy     Depression     Controlled "for the most part" on Lexipro    Encounter for blood transfusion     during transplant    GERD (gastroesophageal reflux disease)     Hashimoto's disease     History of CHF (congestive heart failure)     Previously EF 20% (prior to heart transplant); last EF 60%, PAP 41 as of 12/12/17; throught to be 2/2 genetics & DM1    History of coronary artery disease     H/o Coronary artery disease; resolved since heart transplant 2014    History of myocardial infarction     h/o MI x3 prior to heart transplant    HLD (hyperlipidemia) 6/11/2015    Hypoglycemia unawareness in type 1 diabetes mellitus     Hypothyroid     Initially hyperthyroid 2/2 Hoshimoto's thyroiditis; now on levothyroxine 150 mcg qd    Pleural effusion due to another disorder     Thought to be 2/2 heart tpx; s/p PleuRx catheter placement and removal after 1-2 months    Pulmonary hypertension assoc with unclear multi-factorial mechanisms 12/12/2017    PAP 41 (EF 60%) on ECHO 12/12/17    Syncope 6/30/2015    Type I diabetes mellitus, well controlled     Well controlled; Dx'd @5y/o; on N insulin 20U BID & Humalog 10U w/meals +SSI; Glu 89 11/9/17; A1c 7.2% 4/5/17    Unspecified essential hypertension 05/29/2014    Well controlled; Last /57 on 11/9/17       Past Surgical History:   Procedure Laterality Date    CARDIAC CATHETERIZATION      CARDIAC SURGERY      CHOLECYSTECTOMY      COLONOSCOPY N/A 3/13/2018    Procedure: COLONOSCOPY;  Surgeon: Tim Spencer MD;  Location: [facility] ([address] [place] [address]);  Service: Endoscopy;  Laterality: N/A;    CORONARY ANGIOPLASTY      FOOT SPLIT TRANSFER OF THE POSTERIOR TIBIALIS TENDON PROCEDURE      HEART TRANSPLANT  2014    KNEE SURGERY      PleuRx catheter placement  01/11/2018    Plan for removal after 1-2 months by " Dr. James in cardiothoracic surgery    s/p oht  November 2014    stent placemnet         Review of patient's allergies indicates:   Allergen Reactions    No known drug allergies      Current Facility-Administered Medications   Medication Frequency    albuterol nebulizer solution 2.5 mg Q4H PRN    alprazolam ODT dissolvable tablet 1 mg TID PRN    [START ON 3/15/2018] aspirin EC tablet 81 mg Daily    azithromycin (ZITHROMAX) 500 mg in sodium chloride 0.9% 250 mL IVPB Q24H    benzonatate capsule 200 mg TID PRN    dextrose 50% injection 12.5 g PRN    dextrose 50% injection 25 g PRN    dextrose 50% injection 25 g PRN    [START ON 3/15/2018] escitalopram oxalate tablet 20 mg Daily    famotidine (PF) injection 20 mg Daily    furosemide (LASIX) 500 mg infusion (cont: 10 mg/mL) Continuous    furosemide injection 80 mg Once    gabapentin capsule 600 mg TID    ganciclovir (CYTOVENE) 200 mg in sodium chloride 0.9% 100 mL IVPB Q24H    glucagon (human recombinant) injection 1 mg PRN    glucose chewable tablet 16 g PRN    glucose chewable tablet 24 g PRN    heparin (porcine) injection 5,000 Units Q8H    insulin aspart U-100 pen 0-5 Units QID (AC + HS) PRN    insulin aspart U-100 pen 4 Units TIDWM    insulin detemir U-100 pen 7 Units BID    [START ON 3/15/2018] levothyroxine tablet 125 mcg QAM    norepinephrine 16 mg in dextrose 5 % 250 mL infusion Continuous    norepinephrine bitartrate-D5W 4 mg/250 mL (16 mcg/mL) infusion Soln     nortriptyline capsule 25 mg QHS    ondansetron disintegrating tablet 8 mg Q8H PRN    piperacillin-tazobactam 4.5 g in sodium chloride 0.9% 100 mL IVPB (ready to mix system) Q8H    posaconazole 300 mg in dextrose 5 % 150 mL infusion Daily    posaconazole tablet 300 mg BID    predniSONE tablet 2.5 mg Daily    promethazine-codeine 6.25-10 mg/5 ml syrup 5 mL Q4H PRN    propofol (DIPRIVAN) 10 mg/mL infusion Continuous    sodium chloride 0.45% 1,000 mL with sodium  bicarbonate 1 mEq/mL (8.4 %) 75 mEq infusion Continuous    sodium chloride 0.9% flush 3 mL Q8H    tamsulosin 24 hr capsule 0.4 mg QHS     Family History     Problem Relation (Age of Onset)    Cancer Brother (50)    Diabetes Mother, Father, Brother, Son, Sister, Maternal Uncle, Paternal Aunt, Maternal Grandmother, Maternal Grandfather    Heart disease Mother, Brother    Hypertension Mother, Father, Brother    Kidney disease Mother, Father    Stroke Father, Brother    Vision loss Brother        Social History Main Topics    Smoking status: Never Smoker    Smokeless tobacco: Never Used    Alcohol use No    Drug use: No    Sexual activity: Yes     Partners: Female     Review of Systems   Unable to perform ROS: Intubated     Objective:     Vital Signs (Most Recent):  Temp: 97.9 °F (36.6 °C) (03/14/18 0701)  Pulse: (!) 115 (03/14/18 1045)  Resp: 19 (03/14/18 1045)  BP: (!) 98/59 (03/14/18 1045)  SpO2: 100 % (03/14/18 1045)  O2 Device (Oxygen Therapy): ventilator (03/14/18 1045) Vital Signs (24h Range):  Temp:  [97.9 °F (36.6 °C)-100.8 °F (38.2 °C)] 97.9 °F (36.6 °C)  Pulse:  [108-134] 115  Resp:  [7-40] 19  SpO2:  [88 %-100 %] 100 %  BP: ()/(45-69) 98/59     Weight: 81 kg (178 lb 9.2 oz) (03/13/18 1545)  Body mass index is 27.97 kg/m².  Body surface area is 1.96 meters squared.    I/O last 3 completed shifts:  In: 2277.5 [P.O.:440; I.V.:1287.5; IV Piggyback:550]  Out: 350 [Urine:350]    Physical Exam   Constitutional: He appears well-developed and well-nourished. He is sedated and intubated.   HENT:   Head: Normocephalic and atraumatic.   Neck: Neck supple. No thyromegaly present.   Cardiovascular: Normal rate and regular rhythm.    Pulmonary/Chest: He is intubated. He has rhonchi. He has rales.   Abdominal: Soft. He exhibits no distension.   Musculoskeletal: He exhibits no edema or deformity.   Lymphadenopathy:     He has no cervical adenopathy.        Right: No supraclavicular adenopathy present.         Left: No supraclavicular adenopathy present.   Skin: Skin is warm and dry. He is not diaphoretic.       Significant Labs:  ABGs:   Recent Labs  Lab 03/14/18  0446   PH 7.218*   PCO2 43.4   HCO3 17.7*   POCSATURATED 94*   BE -10     CBC:   Recent Labs  Lab 03/14/18 0425   WBC 5.78   RBC 2.58*   HGB 7.7*   HCT 23.9*      MCV 93   MCH 29.8   MCHC 32.2     CMP:   Recent Labs  Lab 03/14/18 0425 03/14/18  0830   * 299*   CALCIUM 8.3* 9.2   ALBUMIN 2.8*  --    PROT 6.4  --    * 137   K 6.2* 4.4  4.4   CO2 15* 17*    102   BUN 43* 45*   CREATININE 3.7* 3.9*   ALKPHOS 259*  --    ALT 9*  --    AST 28  --    BILITOT 1.4*  --      All labs within the past 24 hours have been reviewed.    Significant Imaging:  Labs: Reviewed  X-Ray: Reviewed

## 2018-03-14 NOTE — ASSESSMENT & PLAN NOTE
BG goal 140-180  Lab Results   Component Value Date    HGBA1C 6.9 (H) 02/11/2018     continue intensive insulin gtt   bg monitoring q2hr      Discharge Recommendations:  KHADIJAH.

## 2018-03-14 NOTE — PROGRESS NOTES
Update:    SW to pt's room for update. Pt intubated and sedated. Pt's wife and family at bedside. Pt's wife reports difficulty coping at this time. SW providing emotional support. Pt's wife reports no other needs at this time. SW providing ongoing psychosocial and counseling support, education, assistance, resources, and discharge planning as indicated. SW continuing to follow and remains available.

## 2018-03-14 NOTE — PROCEDURES
"Lv Crocker is a 42 y.o. male patient.    Temp: 99.1 °F (37.3 °C) (18 1400)  Pulse: (!) 132 (18 1510)  Resp: (!) 28 (18 1510)  BP: 114/63 (18 1500)  SpO2: 99 % (18 1510)  Weight: 81 kg (178 lb 9.2 oz) (18 1545)  Height: 5' 7" (170.2 cm) (18 1545)    Arterial Line  Date/Time: 3/14/2018 4:06 PM  Performed by: KORY FRANKLIN  Authorized by: KORY FRANKLIN   Pre-op Diagnosis: CHF  Post-operative diagnosis: CHF  Consent Done: Yes  Consent: Verbal consent obtained.  Consent given by: spouse  Patient understanding: patient states understanding of the procedure being performed  Patient consent: the patient's understanding of the procedure matches consent given  Procedure consent: procedure consent matches procedure scheduled  Relevant documents: relevant documents present and verified  Test results: test results available and properly labeled  Site marked: the operative site was not marked  Imaging studies: imaging studies not available  Patient identity confirmed:  and name  Indications: multiple ABGs, respiratory failure and hemodynamic monitoring  Location: right femoral  Anesthesia: local infiltration  Patient sedated: yes  Sedatives: propofol  Number of attempts: 2  Post-procedure: line sutured  Comments: Two attempts were made using arrow kit under ultrasound guidance to right radial artery. The artery was accessed each time however the wire included in the Arrow kit failed to easily advance through the vessel , needle tip was confirmed in lumen , bright blood briskly returned when wire withdrawn.     The right femoral artery was located under ultrasound and accessed with a micropuncture needle. A micropunture sheath was advanced over the needle and sutured in place. There were no complications.           No flowsheet data found.    Kory Franklin  3/14/2018  "

## 2018-03-14 NOTE — PROGRESS NOTES
Patient's repeat ABG after being on Comfort flow NC oxygen worse-pH-7.176, pCO2 47.9, p)2 72, Bicarbonate 17.7, SO2 89 % on FiO2 70 %-Patent however appears comfortable,resting, not confused, HR 110s, BP 100s/60s. ABG suggestive of combined respiratory and metabolic acidosis.BMP with CO2 14, AG 18, Crt 3.5, BUN 38, Sodium 135, K 5.5.Lactate is 0.9.Bladder scan with about 200 cc.Discussed with pulmonary- plan to start BIPAP and repeat ABG  In 2 hrs-if worse may need intubation.Discussed with HTS fellow on call.    Discussed with nephrology-starting patient on Bicarbonate drip at 50 cc/hr for metabolic acidosis.    Repeat ABG with improvement ph 7.196/44.4/72 on BIPAP 12/5 FIO2 45 %-Patient tolerating BIPAP well-will continue    Repeat ABG continues to show improvement-will continue plan.

## 2018-03-14 NOTE — ASSESSMENT & PLAN NOTE
BG goal 140-180  Lab Results   Component Value Date    HGBA1C 6.9 (H) 02/11/2018     resume Novolog 4u AC and increase detemir to 7 units bid   Low dose correction and bg monitoring ac/hs      Discharge Recommendations:  TBD.

## 2018-03-14 NOTE — EICU
To room for bronchoscopy.  Scope already inserted at time of my arrival.  Scope removed at 1135.  Pt tolerated procedure well.

## 2018-03-14 NOTE — PROCEDURES
THe ETT was pulled back 3 cm during bronch. The ETT was confirmed to be in good position by bronchoscopy.     Geri Stringer MD  [facility]

## 2018-03-14 NOTE — ASSESSMENT & PLAN NOTE
41yo man w/a history of DM1, Hashimoto's thyroiditis, and ICM (s/p OHT 11/18/2014, CMV D+/R+, steroid induction, on maintenance tacro/MMF/pred; c/b early hemorrhagic tamponade requiring washout, delayed closure, and pericardial window and subsequent AF w/RVR, and CACHORRO; late course c/b ABMR 4/2015 s/p rituxan, PLEX, and IVIG; subsequent C.diff/CMV reactivation, restrictive cardiomyopathy with recurrent pleural effusions s/p VATS/pleurex catheter 1/2018 with removal 2/2018 and ascites requiring repeated LVP, and CKD) who was admitted on 3/11/2018 with ~3wks of worsening profuse non-bloody diarrhea, associated cramping abdominal pain, N/V, and a week of acute on chronic cough, SOB, and fevers and has been found to have multifocal pneumonia on CT chest. He decompensated on 3/13 with acute hypoxic respiratory failure requiring intubation and is critically ill. Differential for his cough/SOB and diarrhea includes some unifying diagnoses (Legionella, disseminated histoplasmosis, disseminated CMV infection, Strongyloidiasis) but it is more likely that he has 2 processes driving his pulmonary and GI symptoms (such as a bacterial OI or IFI causing his multifocal pneumonia with accompanying GI illness such as CMV).     - will continue empiric vanc/zosyn/azithromycin, posaconazole, and IV GCV induction for broad coverage  - have sent noninvasive workup for pulmonary issues including: aspergillus antigen, fungitell, urine histo/blasto antigens, crypto antigen, RVP, urine legionella, and Quantiferon  - have sent Strongyloides ab (of note, O&P negative), adenovirus EIA, and [place] stool PCR panel to complete noninvasive workup of diarrhea  - await pending blood, sputum, and stool cx   - await pending BAL samples obtained today  - flex sig for samples to exclude CMV invasive disease or other pathogens would be helpful but patient is not stable enough for this procedure currently; will empirically treat CMV for now and reassess later

## 2018-03-14 NOTE — PROGRESS NOTES
Palmer placed with 350cc output.   No UOP since then despite lasix 80mg and lasix gtt at 20mg/hr.   Will start SLED with UF goal 300-350cc/hr. Wife aware.       Amairani Alegria, PGY-5  Nephrology Fellow  [facility]  Pager: [phone]

## 2018-03-14 NOTE — PROGRESS NOTES
"[facility]  Endocrinology  Progress Note    Admit Date: 3/11/2018     Reason for Consult: abnormal TFTs    Surgical Procedure and Date: s/p heart transplant      Diabetes diagnosis year: 5yo (T1DM)     Home Diabetes Medications:    Levemir 15u qHS  Novolog 4u AC     How often checking glucose at home? 4 times daily   BG readings on regimen: 150-200s  Hypoglycemia on the regimen?  Yes, rarely - treats appropriately (light snack, glucose tab)  Missed doses on regimen?  No        Diabetes Complications include:     Hyperglycemia, Hypoglycemia  and Diabetic chronic kidney disease          Complicating diabetes co morbidities:   N/A     HPI:   Patient is a 42 y.o. male with a diagnosis of T1DM, HTN, hypothyroidism, s/p heart transplant.  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 He required serial paracentesis and thoracentesis until he underwent a VATS with pleurX catheter placement on 2018 and removed 18.       Presented to hospital secondary to diarrhea and cough.  Upon initial labs, TSH was decreased (0.101) and free T4 1.33.  Endocrinology consulted to assist in management of these labs.  He was last seen in clinic by Kamala Vázquez NP 2017.   Previous hospitalization, endocrine consulted for same problem.  During that visit, recommended decreasing LT4 to 125mcg daily. Unfortunately, patient was discharged on previous dose of 150mcg daily.     Interval HPI:   Overnight events: diet advanced and bg elevated  Eatin%  Nausea: No  Hypoglycemia and intervention: No  Fever: No  TPN and/or TF: No  If yes, type of TF/TPN and rate: n/a    BP (!) 98/59 (BP Location: Right arm, Patient Position: Lying)   Pulse (!) 115   Temp 97.9 °F (36.6 °C) (Oral)   Resp 19   Ht 5' 7" (1.702 m)   Wt 81 kg (178 lb 9.2 oz)   SpO2 100%   BMI 27.97 kg/m²       Labs Reviewed and Include  "     Recent Labs  Lab 03/14/18  0425 03/14/18  0830   * 299*   CALCIUM 8.3* 9.2   ALBUMIN 2.8*  --    PROT 6.4  --    * 137   K 6.2* 4.4  4.4   CO2 15* 17*    102   BUN 43* 45*   CREATININE 3.7* 3.9*   ALKPHOS 259*  --    ALT 9*  --    AST 28  --    BILITOT 1.4*  --      Lab Results   Component Value Date    WBC 5.78 03/14/2018    HGB 7.7 (L) 03/14/2018    HCT 23.9 (L) 03/14/2018    MCV 93 03/14/2018     03/14/2018       Recent Labs  Lab 03/12/18  0443   TSH 0.101*   FREET4 1.33     Lab Results   Component Value Date    HGBA1C 6.9 (H) 02/11/2018       Nutritional status:   Body mass index is 27.97 kg/m².  Lab Results   Component Value Date    ALBUMIN 2.8 (L) 03/14/2018    ALBUMIN 2.7 (L) 03/13/2018    ALBUMIN 2.9 (L) 03/13/2018     Lab Results   Component Value Date    PREALBUMIN 18 (L) 03/24/2015    PREALBUMIN 19 (L) 12/17/2014    PREALBUMIN 24 12/05/2014       Estimated Creatinine Clearance: 24.6 mL/min (A) (based on SCr of 3.9 mg/dL (H)).    Accu-Checks  Recent Labs      03/12/18   0103  03/12/18   0735  03/12/18   0836  03/12/18   1348  03/12/18   1730  03/12/18   2128  03/13/18   0712  03/13/18   2101  03/14/18   0824   POCTGLUCOSE  171*  63*  76  96  134*  225*  157*  205*  244*       Current Medications and/or Treatments Impacting Glycemic Control  Immunotherapy:  Immunosuppressants     None        Steroids:   Hormones     Start     Stop Route Frequency Ordered    03/12/18 0900  predniSONE tablet 2.5 mg      -- Oral Daily 03/12/18 0023        Pressors:    Autonomic Drugs     Start     Stop Route Frequency Ordered    03/14/18 1045  norepinephrine 16 mg in dextrose 5 % 250 mL infusion     Question Answer Comment   Titrate by: (in mcg/kg/min) 0.02    Titrate interval: (in minutes) 2    Titrate to maintain: (MAP or SBP) MAP    Greater than: (in mmHg) 60    Maximum dose: (in mcg/kg/min) 3        -- IV Continuous 03/14/18 0937    03/14/18 0935  norepinephrine bitartrate-D5W 4 mg/250 mL (16  mcg/mL) infusion Soln     Comments:  Created by cabinet override    03/14 2144 03/14/18 0935        Hyperglycemia/Diabetes Medications: Antihyperglycemics     Start     Stop Route Frequency Ordered    03/14/18 2100  insulin detemir U-100 pen 7 Units      -- SubQ 2 times daily 03/14/18 1105    03/14/18 1215  insulin aspart U-100 pen 4 Units      -- SubQ 3 times daily with meals 03/14/18 1106    03/12/18 1318  insulin aspart U-100 pen 0-5 Units      -- SubQ Before meals & nightly PRN 03/12/18 1218          ASSESSMENT and PLAN    Type 1 diabetes mellitus with stage 3 chronic kidney disease      BG goal 140-180  Lab Results   Component Value Date    HGBA1C 6.9 (H) 02/11/2018     Start intensive insulin gtt with acute respiratory failure and critical illness  bg monitoring q1hr      Discharge Recommendations:  TBD.        Hypothyroidism due to Hashimoto's thyroiditis      Abnormal TFTs upon admit.  Unclear if secondary to illness, but has been seen before in the past (during previous hospitalization).    Continue LT4 125mcg daily.  Repeat TFTs in 4 weeks.          Heart transplanted    Per transplant  Avoid hypoglycemia              Ankur Keith MD  Endocrinology  [facility]

## 2018-03-14 NOTE — NURSING
Rounds Report: Attended interdisciplinary rounds this morning with the transplant team including SW, physicians, fellows,  mid-level providers, and transplant coordinators.  Discussed plan of care, including possible discharge date 2 weeks. Pt intubated and bronched this morning.  I was able to meet with the pt and family today. Pt on ventilator, sedated on multiple drips. Pt's wife tired and upset over his rapid decline. I provided emotional support and offered for pt's wife to contact me for any needs.

## 2018-03-14 NOTE — PROCEDURES
"Lv Crocker is a 42 y.o. male patient.    Temp: 97.9 °F (36.6 °C) (03/14/18 0701)  Pulse: (!) 125 (03/14/18 0800)  Resp: 12 (03/14/18 0800)  BP: (!) 118/57 (03/14/18 0800)  SpO2: 95 % (03/14/18 0800)  Weight: 81 kg (178 lb 9.2 oz) (03/13/18 1545)  Height: 5' 7" (170.2 cm) (03/13/18 1545)    Central Line  Date/Time: 3/14/2018 8:51 AM  Location procedure was performed: [facility] ([facility])  Performed by: SONIA NEAL  Pre-operative Diagnosis: Acute kidney injury  Post-operative diagnosis: Acute kidney injury  Consent Done: Yes  Time out: Immediately prior to procedure a "time out" was called to verify the correct patient, procedure, equipment, support staff and site/side marked as required.  Indications: hemodialysis  Anesthesia: local infiltration    Anesthesia:  Local Anesthetic: lidocaine 1% without epinephrine  Anesthetic total: 5 mL  Preparation: skin prepped with ChloraPrep  Skin prep agent dried: skin prep agent completely dried prior to procedure  Sterile barriers: all five maximum sterile barriers used - cap, mask, sterile gown, sterile gloves, and large sterile sheet  Hand hygiene: hand hygiene performed prior to central venous catheter insertion  Location details: right internal jugular  Catheter type: dialysis  Catheter size: 12 Fr  Ultrasound guidance: yes  Vessel Caliber: medium, compressibility normal  Needle advanced into vessel with real time Ultrasound guidance.  Guidewire confirmed in vessel.  Sterile sheath used.  Manometry: Yes  Number of attempts: 2  Assessment: placement verified by x-ray  Complications: none  Estimated blood loss (mL): 10  Specimens: No  Implants: No  Post-procedure: line sutured,  chlorhexidine patch,  sterile dressing applied and blood return through all ports  Complications: No          No flowsheet data found.    Sonia Neal  3/14/2018  "

## 2018-03-14 NOTE — EICU
Called to room for emergent intubation.  Present are MDs, RNs, and RT.  Pt being bagged with 100% FiO2.  SaO2 88%.  Bedside team states Versed 2 mg given at 0924 prior to my arrival.  0926 - Propofol 100 mg given IVP per MD.  Fentanyl 100 mg given IVP per MD  0928 - intubated per MD Ever with 8.0 ETT secured at 28 cm to lip.  Bagged with 100% FiO2 with immediate increase in SaO2.  Bedside team confirms bilat air exchange auscultated.  PCXR ordered.  Pt placed to vent per MD ordered setting.

## 2018-03-15 NOTE — PLAN OF CARE
Problem: Patient Care Overview  Goal: Plan of Care Review  Outcome: Ongoing (interventions implemented as appropriate)  Recommendations     1. TF recommendations:               - With current Propofol rate, recommend Peptamen Intense VHP @ 50 mL/hr to provide 1778 calories, 110 g of protein, 1008 mL fluid.                          - Propofol currently providing 578 calories.              - When Propofol discontinued, recommend Glucerna 1.5 @ 50 mL/hr to provide 1800 calories, 99 g of protein, 911 mL fluid.                          - Hold for residuals >500 mL; additional fluid per MD.  2. If able to extubate & advance diet, recommend 2 gram sodium, 2000 kcal ADA diet (texture per SLP).   3. RD to monitor & follow-up.

## 2018-03-15 NOTE — ASSESSMENT & PLAN NOTE
- TTE 3/12/18 showed LVEF 50-55% (but no substantial change when compared to previous echo), RV normal and IVC 8  - Had -ve DSE on 11/30/17  - Current immunosuppression: Pred 2.5 mg qd. Prograf on hold secondary to elevated level. MMF on hold secondary to infection.

## 2018-03-15 NOTE — PROGRESS NOTES
[facility]  Heart Transplant  Progress Note    Patient Name: Lv Crocker  MRN: [id number]  Admission Date: 3/11/2018  Hospital Length of Stay: 3 days  Attending Physician: Jose A Lloyd MD  Primary Care Provider: Philippe Mohr MD  Principal Problem:Acute respiratory failure with hypoxia    Subjective:     Interval History: Intubated/sedated. Awakens and follows commands when sedation decreased.     Continuous Infusions:   sodium chloride 0.9% 200 mL/hr at 03/15/18 0800    fentanyl 75 mcg/hr (03/15/18 0731)    insulin (HUMAN R) infusion (adults) 0.1 Units/hr (03/15/18 0636)    norepinephrine bitartrate-D5W 0.14 mcg/kg/min (03/15/18 0816)    propofol 45 mcg/kg/min (03/15/18 0817)    custom IV infusion builder Stopped (03/14/18 1300)     Scheduled Meds:   azithromycin (ZITHROMAX) 500 mg IVPB  500 mg Intravenous Q24H    chlorhexidine  15 mL Mouth/Throat BID    escitalopram oxalate  20 mg Per NG tube Daily    famotidine (PF)  20 mg Intravenous Daily    ganciclovir (CYTOVENE) IVPB  200 mg Intravenous Q24H    heparin (porcine)  5,000 Units Subcutaneous Q8H    piperacillin-tazobactam (ZOSYN) IVPB  4.5 g Intravenous Q8H    posaconazole (NOXAFIL) 300 mg in dextrose 5% IVPB  300 mg Intravenous Daily    predniSONE  2.5 mg Per NG tube Daily    sodium chloride 0.9%  3 mL Intravenous Q8H     PRN Meds:dextrose 50%, dextrose 50%, glucagon (human recombinant), glucose, glucose, k phos di & mono-sod phos mono, magnesium sulfate IVPB    Review of patient's allergies indicates:   Allergen Reactions    No known drug allergies      Objective:     Vital Signs (Most Recent):  Temp: 96 °F (35.6 °C) (03/15/18 0733)  Pulse: (!) 122 (03/15/18 0800)  Resp: (!) 22 (03/15/18 0800)  BP: 106/65 (03/14/18 1700)  SpO2: (!) 94 % (03/15/18 0800) Vital Signs (24h Range):  Temp:  [96 °F (35.6 °C)-99.2 °F (37.3 °C)] 96 °F (35.6 °C)  Pulse:  [] 122  Resp:  [0-40] 22  SpO2:  [94 %-100 %] 94 %  BP:  ()/(45-65) 106/65  Arterial Line BP: ()/(44-68) 99/44     Patient Vitals for the past 72 hrs (Last 3 readings):   Weight   03/13/18 1545 81 kg (178 lb 9.2 oz)   03/13/18 0400 78.6 kg (173 lb 4.5 oz)     Body mass index is 27.97 kg/m².      Intake/Output Summary (Last 24 hours) at 03/15/18 0835  Last data filed at 03/15/18 0800   Gross per 24 hour   Intake          1693.05 ml   Output             4667 ml   Net         -2973.95 ml       Hemodynamic Parameters:  CVP:  [21 mmHg] 21 mmHg    Telemetry: ST    Physical Exam   Constitutional: He is sedated and intubated.   Appears ill   HENT:   Head: Normocephalic and atraumatic.   Eyes: Conjunctivae and EOM are normal. Pupils are equal, round, and reactive to light.   Neck: Normal range of motion. No JVD present. No thyromegaly present.   RIJ trialysis in place. CDI.    Cardiovascular: Regular rhythm.    Tachycardic   Pulmonary/Chest: Effort normal. He is intubated.   Intubated    Abdominal: Soft. Bowel sounds are normal.   + ascites   Musculoskeletal: Normal range of motion. He exhibits no edema.   Neurological: He is alert.   Skin: Skin is warm and dry. Capillary refill takes less than 2 seconds. There is pallor.        Psychiatric: He has a normal mood and affect. His behavior is normal. Judgment and thought content normal.       Significant Labs:  CBC:    Recent Labs  Lab 03/13/18  2334 03/14/18  0425 03/15/18  0406   WBC 6.12 5.78 6.87   RBC 2.54* 2.58* 2.85*   HGB 7.6* 7.7* 8.5*   HCT 23.4* 23.9* 24.9*    175 201   MCV 92 93 87   MCH 29.9 29.8 29.8   MCHC 32.5 32.2 34.1     BNP:    Recent Labs  Lab 03/11/18  1922 03/13/18  0815   * 359*     CMP:    Recent Labs  Lab 03/13/18  2334 03/14/18  0425  03/14/18  2201 03/15/18  0045 03/15/18  0406 03/15/18  0738   GLU  --  236*  < > 224* 166* 136*  136*  136* 138*   CALCIUM  --  8.3*  < > 8.5* 8.6* 8.4*  8.4*  8.4* 8.6*   ALBUMIN 2.7* 2.8*  --  2.5*  --  2.5*  2.5*  --    PROT 6.0 6.4  --   --    --  5.9*  --    NA  --  133*  < > 138 140 142  142  142 143   K  --  6.2*  < > 4.3 4.3  4.3 4.2  4.2  4.2  4.2 4.7  4.7   CO2  --  15*  < > 21* 23 25  25  25 27   CL  --  101  < > 103 104 104  104  104 104   BUN  --  43*  < > 28* 19 13  13  13 10   CREATININE  --  3.7*  < > 2.5* 1.9* 1.3  1.3  1.3 1.2   ALKPHOS 286* 259*  --   --   --  209*  --    ALT 9* 9*  --   --   --  9*  --    AST 21 28  --   --   --  35  --    BILITOT 1.1* 1.4*  --   --   --  1.4*  --    < > = values in this interval not displayed.   Coagulation:   No results for input(s): PT, INR, APTT in the last 168 hours.  LDH:  No results for input(s): LDH in the last 72 hours.  Microbiology:  Microbiology Results (last 7 days)     Procedure Component Value Units Date/Time    Culture, Respiratory with Gram Stain [[id number]] Collected:  03/14/18 1137    Order Status:  Completed Specimen:  Respiratory from BAL, TRIPP Updated:  03/14/18 2302     Gram Stain (Respiratory) No WBC's     Gram Stain (Respiratory) No organisms seen    Blood culture [[id number]] Collected:  03/13/18 1637    Order Status:  Completed Specimen:  Blood from Peripheral, Antecubital, Left Updated:  03/14/18 2012     Blood Culture, Routine No Growth to date     Blood Culture, Routine No Growth to date    Blood culture [[id number]] Collected:  03/13/18 1701    Order Status:  Completed Specimen:  Blood from Peripheral, Forearm, Left Updated:  03/14/18 2012     Blood Culture, Routine No Growth to date     Blood Culture, Routine No Growth to date    Stool culture **CANNOT BE ORDERED STAT** [[id number]] Collected:  03/11/18 1923    Order Status:  Completed Specimen:  Stool from Stool Updated:  03/14/18 1549     Stool Culture No enteric hank     Stool Culture No Salmonella,Shigella,Vibrio,Campylobacter,Yersinia isolated.    Culture, Fluid  (Aerobic) [[id number]] Collected:  03/14/18 1137    Order Status:  Canceled Specimen:  Bronchial Wash from Amniotic Fluid Updated:  03/14/18 1410     AFB Culture & Smear [[id number]] Collected:  03/14/18 1137    Order Status:  Sent Specimen:  Bronchial Wash from Amniotic Fluid Updated:  03/14/18 1416    Fungus culture [[id number]] Collected:  03/14/18 1137    Order Status:  Sent Specimen:  Bronchial Wash from Amniotic Fluid Updated:  03/14/18 1416    Cryptococcal antigen [[id number]] Collected:  03/14/18 0425    Order Status:  Completed Specimen:  Blood from Blood Updated:  03/14/18 1217     Cryptococcal Ag, Blood Negative    Culture, Respiratory with Gram Stain [[id number]] Collected:  03/13/18 1441    Order Status:  Completed Specimen:  Respiratory from Sputum, Expectorated Updated:  03/14/18 0931     Respiratory Culture Normal respiratory hank     Gram Stain (Respiratory) <10 epithelial cells per low power field.     Gram Stain (Respiratory) Rare WBC's     Gram Stain (Respiratory) Moderate Gram positive cocci     Gram Stain (Respiratory) Many Gram negative rods    Culture, Respiratory with Gram Stain [[id number]] Collected:  03/13/18 1701    Order Status:  Completed Specimen:  Respiratory from Sputum, Expectorated Updated:  03/13/18 2224     Respiratory Culture Specimen inadequate - culture not performed     Gram Stain (Respiratory) >10epis/lfp and <than many WBC's     Gram Stain (Respiratory) Predominance of oropharyngeal hank. Please recollect.    Respiratory Viral Panel by PCR [facility]; Nasal Swab [[id number]] Collected:  03/13/18 1733    Order Status:  Sent Specimen:  Respiratory Updated:  03/13/18 1900    Clostridium difficile EIA [[id number]] Collected:  03/11/18 1923    Order Status:  Completed Specimen:  Stool from Stool Updated:  03/11/18 2355     C. diff Antigen Negative     C difficile Toxins A+B, EIA Negative     Comment: Testing not recommended for children <24 months old.       Stool culture [[id number]]     Order Status:  Completed Specimen:  Stool from Stool     Stool culture [[id number]]     Order Status:  Canceled Specimen:  Stool from Stool     E.  coli 0157 antigen [[id number]] Collected:  03/11/18 1923    Order Status:  Canceled Specimen:  Stool from Stool Updated:  03/11/18 2001        I have reviewed all pertinent labs within the past 24 hours.    Estimated Creatinine Clearance: 80.1 mL/min (based on SCr of 1.2 mg/dL).    Diagnostic Results:  I have reviewed all pertinent imaging results/findings within the past 24 hours.    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 Most recently has had ongoing issues with his lungs, had gotten 2 thoracenteses, after which he underwent VATS 01/19/18 followed by pleurex catheter placement and effusion drainage 01/11/18, subsequently had it removed 02/07/18 after drainage had decreased despite multiple attempts to drain it. Has been having significant coughing fits that result in him being near-syncopal at times, although difficult to say if he has passed out or not- patient most recently was admitted to the hospital for increased AGUILAR, coughing and pre-syncope 02/11-02/14/18 at [facility].   Patient was started on antibiotics for suspected bacterial infection, with some diarrhea, bronchitis, and possible pneumonia. Ct chest showed some pleural fluid collection and after talking with Dr. James, we arranged for him to receive a diagnostic tap with IR, from which the fluid collection demonstrated no growth or significant findings at all really. Cell counts do not appear to have been sent but will recheck. Patient states since his hospital stay, yesterday had a significant coughing fit again, after which he nearly passed out, is being seen in clinic today for this. Denies any cardiopulmonary complaints, no leg swelling, has some abdominal swelling, which is moderate for him. Denies significant shortness of breath with mild exertion,  had 6MWT yesterday to see if he qualified for home O2, and his O2 sats actually improved after recovery from where he started initially. He and his wife admit he is in bed most days, not very active.     Mr. Crocker presented to the ED 3/11/18 with approximately 3 weeks of worsening diarrhea and 2-3 days of sinus pressure, drainage, and worsened cough.  He notes that he had a coughing fit last night and passed out, coughed throughout most of the night.  This also happened on 2/25/18.  He last saw Dr Ferreira in clinic on 2/20/18 where he was taken off myfortic and switched to cellcept (he hadn't been on cellcept because of leukopenia when they tried post-transplant).  Though his diarrhea had improved after his last discharge, he states it has increased now to 15x/day, clear/yellow/green, no fevers, no exacerbating foods per say.  He was taken back off the cellcept and placed back on myfortic this past week and has not noticed immediate change in diarrhea. He also reports his baseline coughing fits havent improved and are minimally responsive to tessalon pearls.  Came on last night, he lost consciousness during a fit once which is relatively normal for him, and had two more during HPI.  He notes no sick contacts but does state he's had some URI symptoms as above.  His baseline vitals are usually -120 and BP 90s/60s-110s/70s.  He reports a history of intermittent diarrhea - did have Cdiff many years ago but this smells different.  No abdominal pain, no nausea, no vomiting.  No blood in diarrhea.  Appears last c-scope in 2015 with no evidence of infection or inflammatory processes.  He does report IBS which is different that this.       * Acute respiratory failure with hypoxia    -Continue current vent settings  -S/P Bronch 3/14. Awaiting cultures.   -Appreciate PUL/ID Cx.   -Continue empiric vanc/zosyn/azithromycin for broad bacterial coverage and posaconazole for nodular appearing lesions in lungs   - Started  induction GCV  until invasive CMV is excluded  - aspergillus antigen, fungitell, urine histo/blasto antigens, crypto antigen, RVP, urine legionella, and Quantiferon pending        Restrictive cardiomyopathy    - S/P thoracentesis X 2, followed by VATS/pleurex cath placement (January 2018) with removal of pleurex (February 2018) and 3rd thoracentesis 2/14/18 with no significant findings on fluid removal. Moderate right pleural effusion stable by CXR 3/11/18. Awaiting chest CT today  - Last paracentesis was in January. Abd US 3/12/18 confirmed ongoing ascites (not tense at all). Will consider getting paracentesis this admit  - Continue Levophed for MAP 60.         Acute renal failure superimposed on stage 4 chronic kidney disease    - CVP 21 this am.  - Appreciate Neph Cx. Continue CRRT as tolerated.         Heart transplanted    - TTE 3/12/18 showed LVEF 50-55% (but no substantial change when compared to previous echo), RV normal and IVC 8  - Had -ve DSE on 11/30/17  - Current immunosuppression: Pred 2.5 mg qd. Prograf on hold secondary to elevated level. MMF on hold secondary to infection.        Diarrhea    - C. Diff -ve. Other stool studies pending  - CMV DNA and Norovirus PCR pending  - Strongyloides ab, adenovirus EIA, and [place] stool PCR panel pending  - Consulted GI - plan upper and lower endoscopy once more stable.         Leukopenia    - WBC count stable. May require neupogen if trends down with current treatment.        Cough    - See resp failure above.        Type 1 diabetes mellitus with stage 3 chronic kidney disease    - Appreciate Endocrine's recs        Hypothyroidism due to Hashimoto's thyroiditis    - TSH low at 0.101 with normal FT4. Appreciate Endocrine's recs        Critical Care Time: 30 minutes     Critical care was time spent personally by me on the following activities: development of treatment plan with patient or surrogate and bedside caregivers, discussions with consultants, evaluation of  patient's response to treatment, examination of patient, ordering and performing treatments and interventions, ordering and review of laboratory studies, ordering and review of radiographic studies, pulse oximetry, re-evaluation of patient's condition. This critical care time did not overlap with that of any other provider or involve time for any procedures.      Tim Mao NP  Heart Transplant  [facility]

## 2018-03-15 NOTE — ASSESSMENT & PLAN NOTE
- baseline sCr 2.6-3.0 consistent with CKD stage IV  - oliguric CACHORRO; likely multifactorial in etiology: pre-renal from diarrhea + decreased renal perfusion from prograf vasoconstriction + prograf nephrotoxicity. Also cannot rule out septic ATN  - later became anuric with no response to lasix bolus/gtt  - SLED started 3/14  - net negative 300cc yesterday. Acidosis and hyperkalemia resolved  - UOP only 150cc over last 12 hours  - vent settings improved but lungs sound awful this morning  - will continue SLED today with -350cc/hr (net 100-150cc/hr)  - recommend checking CVP when able  - keep osullivan catheter for now

## 2018-03-15 NOTE — ASSESSMENT & PLAN NOTE
43yo man w/a history of DM1, Hashimoto's thyroiditis, and ICM (s/p OHT 11/18/2014, CMV D+/R+, steroid induction, on maintenance tacro/MMF/pred; c/b early hemorrhagic tamponade requiring washout, delayed closure, and pericardial window and subsequent AF w/RVR, and CACHORRO; late course c/b ABMR 4/2015 s/p rituxan, PLEX, and IVIG; subsequent C.diff/CMV reactivation, restrictive cardiomyopathy with recurrent pleural effusions s/p VATS/pleurex catheter 1/2018 with removal 2/2018 and ascites requiring repeated LVP, and CKD) who was admitted on 3/11/2018 with ~3wks of worsening profuse non-bloody diarrhea, associated cramping abdominal pain, N/V, and a week of acute on chronic cough, SOB, and fevers and has been found to have multifocal pneumonia on CT chest. He decompensated on 3/13 with acute hypoxic respiratory failure requiring intubation and is critically ill. Differential for his cough/SOB and diarrhea includes some unifying diagnoses (Legionella, disseminated histoplasmosis, disseminated CMV infection, Strongyloidiasis, community acquired viral infection) but it is more likely that he has 2 processes driving his pulmonary and GI symptoms (such as a bacterial OI or IFI causing his multifocal pneumonia with accompanying GI illness such as CMV).     - will continue empiric vanc/zosyn/azithromycin, posaconazole, and IV GCV induction for broad coverage  - have sent noninvasive workup for pulmonary issues, remaining tests include: urine histo/blasto antigens, RVP, urine legionella, and Quantiferon  - have sent Strongyloides ab (of note, O&P negative), adenovirus EIA, and [place] stool PCR panel to complete noninvasive workup of diarrhea  - await pending blood, BAL, and stool cx   - flex sig for samples to exclude CMV invasive disease or other pathogens would be helpful but patient is not stable enough for this procedure currently; will empirically treat CMV for now and reassess later

## 2018-03-15 NOTE — ASSESSMENT & PLAN NOTE
-Continue current vent settings  -S/P Bronch 3/14. Awaiting cultures.   -Appreciate PUL/ID Cx.   -Continue empiric vanc/zosyn/azithromycin for broad bacterial coverage and posaconazole for nodular appearing lesions in lungs   - Started induction GCV  until invasive CMV is excluded  - aspergillus antigen, fungitell, urine histo/blasto antigens, crypto antigen, RVP, urine legionella, and Quantiferon pending

## 2018-03-15 NOTE — PLAN OF CARE
Problem: Patient Care Overview  Goal: Plan of Care Review  Outcome: Ongoing (interventions implemented as appropriate)    Neuro: Sedated and intubated. PERRLA. Radial Pulses +2. Dorsalis Pedis +1.       Pulmonary: Breath sounds are coarse and crackles in bilateral upper lobes and diminished bilateral lower lobes. CXR in AM is significantly opaque vs previous CXR. ET tube at 25 at lips on A/C ventilator with PEEP of 5.0, , FiO2 50%, Resp 20. Obtained ABGs - 91% SPO2. Patient continue to bite tube but RT placed bite block.      Cardiovascular: Levo at 0.18 to keep MAP >60. Patient is Sinus Tach. Extremities are cold to the touch.     Gastrointestinal: OG tube at 60 cm. Restarted TF at 10cc/hr. Goal rate of 50cc/hr. Checked residual of 30 cc at 1700. Residual is a slight green and TF color, returned stomach contents back.      Genitourinary: Indwelling osullivan in place. Oliguric. Patient on CRRT on SLED of .      Endocrine: Stopped insulin drip at 1530. Last BG is 144.      Integumentary/Other: Stage II pressure ulcer on sacrum - no dressing.      Infusions: Propofol currently being titrated off; versed being sedative replacement. `     Restraints: Bilateral soft mitt restraints continue to be applied d/t pt pulling at IV and Osullivan line during previous shifts. Restraint orders will  3/16/2018 @ 1617.    Bed in low, locked position, call light within reach, side rails up x3. Will continue to monitor.

## 2018-03-15 NOTE — ASSESSMENT & PLAN NOTE
Abnormal TFTs upon admit.  Unclear if secondary to illness, but has been seen before in the past (during previous hospitalization).    Continue LT4 ng 125mcg daily.  Repeat TFTs in 4 weeks.

## 2018-03-15 NOTE — PROGRESS NOTES
"[facility]  Endocrinology  Progress Note    Admit Date: 3/11/2018     Reason for Consult: abnormal TFTs    Surgical Procedure and Date: s/p heart transplant 2014     Diabetes diagnosis year: 5yo (T1DM)     Home Diabetes Medications:    Levemir 15u qHS  Novolog 4u AC     How often checking glucose at home? 4 times daily   BG readings on regimen: 150-200s  Hypoglycemia on the regimen?  Yes, rarely - treats appropriately (light snack, glucose tab)  Missed doses on regimen?  No        Diabetes Complications include:     Hyperglycemia, Hypoglycemia  and Diabetic chronic kidney disease          Complicating diabetes co morbidities:   N/A     HPI:   Patient is a 42 y.o. male with a diagnosis of T1DM, HTN, hypothyroidism, s/p heart transplant.  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 He required serial paracentesis and thoracentesis until he underwent a VATS with pleurX catheter placement on 1/11/2018 and removed 2/7/18.       Presented to hospital secondary to diarrhea and cough.  Upon initial labs, TSH was decreased (0.101) and free T4 1.33.  Endocrinology consulted to assist in management of these labs.  He was last seen in clinic by Kamala Vázquez NP 11/2017.   Previous hospitalization, endocrine consulted for same problem.  During that visit, recommended decreasing LT4 to 125mcg daily. Unfortunately, patient was discharged on previous dose of 150mcg daily.     Interval HPI:   Overnight events: none  Eating:   NPO  Nausea: No  Hypoglycemia and intervention: No  Fever: No  TPN and/or TF: No  If yes, type of TF/TPN and rate: n/a    BP (!) 142/73   Pulse (!) 128   Temp 96 °F (35.6 °C) (Axillary)   Resp (!) 22   Ht 5' 7" (1.702 m)   Wt 81 kg (178 lb 9.2 oz)   SpO2 (!) 92%   BMI 27.97 kg/m²       Labs Reviewed and Include      Recent Labs  Lab 03/15/18  0406 03/15/18  0738   *  136* "  136* 138*   CALCIUM 8.4*  8.4*  8.4* 8.6*   ALBUMIN 2.5*  2.5*  --    PROT 5.9*  --      142  142 143   K 4.2  4.2  4.2  4.2 4.7  4.7   CO2 25  25  25 27     104  104 104   BUN 13  13  13 10   CREATININE 1.3  1.3  1.3 1.2   ALKPHOS 209*  --    ALT 9*  --    AST 35  --    BILITOT 1.4*  --      Lab Results   Component Value Date    WBC 6.87 03/15/2018    HGB 8.5 (L) 03/15/2018    HCT 24.9 (L) 03/15/2018    MCV 87 03/15/2018     03/15/2018       Recent Labs  Lab 03/12/18  0443   TSH 0.101*   FREET4 1.33     Lab Results   Component Value Date    HGBA1C 6.9 (H) 02/11/2018       Nutritional status:   Body mass index is 27.97 kg/m².  Lab Results   Component Value Date    ALBUMIN 2.5 (L) 03/15/2018    ALBUMIN 2.5 (L) 03/15/2018    ALBUMIN 2.5 (L) 03/14/2018     Lab Results   Component Value Date    PREALBUMIN 5 (L) 03/15/2018    PREALBUMIN 18 (L) 03/24/2015    PREALBUMIN 19 (L) 12/17/2014       Estimated Creatinine Clearance: 80.1 mL/min (based on SCr of 1.2 mg/dL).    Accu-Checks  Recent Labs      03/15/18   0013  03/15/18   0047  03/15/18   0107  03/15/18   0229  03/15/18   0343  03/15/18   0431  03/15/18   0557  03/15/18   0637  03/15/18   0742  03/15/18   0837   POCTGLUCOSE  197*  182*  167*  131*  148*  143*  145*  141*  127*  148*       Current Medications and/or Treatments Impacting Glycemic Control  Immunotherapy:  Immunosuppressants     None        Steroids:   Hormones     Start     Stop Route Frequency Ordered    03/14/18 1122  predniSONE tablet 2.5 mg      -- PER NG TUBE Daily 03/14/18 1122        Pressors:    Autonomic Drugs     Start     Stop Route Frequency Ordered    03/14/18 1045  norepinephrine 16 mg in dextrose 5 % 250 mL infusion     Question Answer Comment   Titrate by: (in mcg/kg/min) 0.02    Titrate interval: (in minutes) 2    Titrate to maintain: (MAP or SBP) MAP    Greater than: (in mmHg) 60    Maximum dose: (in mcg/kg/min) 3        -- IV Continuous 03/14/18 0979         Hyperglycemia/Diabetes Medications: Antihyperglycemics     Start     Stop Route Frequency Ordered    03/14/18 1630  insulin regular (Humulin R) 100 Units in sodium chloride 0.9% 100 mL infusion     Question:  Insulin Rate Adjustment (DO NOT MODIFY ANSWER)  Answer:  [email or web address]    -- IV Continuous 03/14/18 1530          ASSESSMENT and PLAN    Type 1 diabetes mellitus with stage 3 chronic kidney disease      BG goal 140-180  Lab Results   Component Value Date    HGBA1C 6.9 (H) 02/11/2018     continue intensive insulin gtt   bg monitoring q2hr      Discharge Recommendations:  TBD.        Hypothyroidism due to Hashimoto's thyroiditis    Abnormal TFTs upon admit.  Unclear if secondary to illness, but has been seen before in the past (during previous hospitalization).    Continue LT4 ng 125mcg daily.  Repeat TFTs in 4 weeks.          Heart transplanted    Per transplant  Avoid hypoglycemia              Ankur Keith MD  Endocrinology  [facility]

## 2018-03-15 NOTE — CONSULTS
"  [facility]  Adult Nutrition  Consult Note    SUMMARY     Recommendations    1. TF recommendations:    - With current Propofol rate, recommend Peptamen Intense VHP @ 50 mL/hr to provide 1778 calories, 110 g of protein, 1008 mL fluid.    - Propofol currently providing 578 calories.   - When Propofol discontinued, recommend Glucerna 1.5 @ 50 mL/hr to provide 1800 calories, 99 g of protein, 911 mL fluid.    - Hold for residuals >500 mL; additional fluid per MD.  2. If able to extubate & advance diet, recommend 2 gram sodium, 2000 kcal ADA diet (texture per SLP).   3. RD to monitor & follow-up.    Goals: Meet % EEN, EPN  Nutrition Goal Status: new  Communication of RD Recs: reviewed with RN    Reason for Assessment    Reason for Assessment: consult  Diagnosis: other (see comments) (Resp. fx)  Relevant Medical History: Hashimoto's disease, HTN, HLD, DM, CHF, Heart tx (2014)  Interdisciplinary Rounds: did not attend    General Information Comments: Pt intubated, sedated, receiving CRRT.  Nutrition Discharge Planning: Unable to determine    Nutrition Risk Screen    Nutrition Risk Screen: other (see comments)    Nutrition/Diet History    Patient Reported Diet/Restrictions/Preferences: other (see comments) (HAO)  Do you have any cultural, spiritual, Restorationism conflicts, given your current situation?: none  Factors Affecting Nutritional Intake: NPO, on mechanical ventilation    Anthropometrics    Temp: 96 °F (35.6 °C)  Height Method: Stated  Height: 5' 7" (170.2 cm)  Height (inches): 67 in  Weight Method: Bed Scale  Weight: 81 kg (178 lb 9.2 oz)  Weight (lb): 178.57 lb  Ideal Body Weight (IBW), Male: 148 lb  % Ideal Body Weight, Male (lb): 120.66 lb  BMI (Calculated): 28  BMI Grade: 25 - 29.9 - overweight  Usual Body Weight (UBW), kg:  (HAO)    Lab/Procedures/Meds    Pertinent Labs Reviewed: reviewed  Pertinent Labs Comments: Gluc 136, A1C 6.9  Pertinent Medications Reviewed: reviewed  Pertinent " "Medications Comments: Fentanyl, Insulin, Levophed, Propofol    Physical Findings/Assessment    Overall Physical Appearance: nourished, on ventilator support  Tubes: orogastric tube  Oral/Mouth Cavity: WDL  Skin: intact    Estimated/Assessed Needs    Weight Used For Calorie Calculations: 81 kg (178 lb 9.2 oz)  Height: 5' 7" (170.2 cm)    Energy Calorie Requirements (kcal): 1778 kcal/d  Energy Need Method: [facility]    Protein Requirements:  g/d (1.2-1.5 g/kg)  Weight Used For Protein Calculations: 81 kg (178 lb 9.2 oz)    Fluid Need Method: other (see comments) (Per MD or 1 mL/kcal)    CHO Requirement: 50% total kcals     Nutrition Prescription Ordered    Current Diet Order: NPO    Evaluation of Received Nutrient/Fluid Intake    Other Calories (kcal): 578 (Propofol @ 21.9 mL/hr)    Nutrition Risk    Level of Risk/Frequency of Follow-up: high     Assessment and Plan    * Acute respiratory failure with hypoxia      Nutrition Problem  Inadequate energy intake    Related to (etiology):   Inability to consume sufficient energy    Signs and Symptoms (as evidenced by):   NPO with no alternate means of nutrition     Nutrition Diagnosis Status:   New         Monitor and Evaluation    Food and Nutrient Intake: energy intake, food and beverage intake, enteral nutrition intake  Food and Nutrient Adminstration: diet order, enteral and parenteral nutrition administration  Physical Activity and Function: nutrition-related ADLs and IADLs  Anthropometric Measurements: weight, weight change  Biochemical Data, Medical Tests and Procedures: lipid profile, inflammatory profile, glucose/endocrine profile, gastrointestinal profile, electrolyte and renal panel  Nutrition-Focused Physical Findings: overall appearance     Nutrition Follow-Up    RD Follow-up?: Yes    "

## 2018-03-15 NOTE — ASSESSMENT & PLAN NOTE
- S/P thoracentesis X 2, followed by VATS/pleurex cath placement (January 2018) with removal of pleurex (February 2018) and 3rd thoracentesis 2/14/18 with no significant findings on fluid removal. Moderate right pleural effusion stable by CXR 3/11/18. Awaiting chest CT today  - Last paracentesis was in January. Abd US 3/12/18 confirmed ongoing ascites (not tense at all). Will consider getting paracentesis this admit  - Continue Levophed for MAP 60.

## 2018-03-15 NOTE — PROGRESS NOTES
[facility]  Nephrology  Progress Note    Patient Name: Lv Crocker  MRN: [id number]  Admission Date: 3/11/2018  Hospital Length of Stay: 3 days  Attending Provider: Jose A Lloyd MD   Primary Care Physician: Philippe Mohr MD  Principal Problem:Acute respiratory failure with hypoxia    Subjective:     HPI: Mr. Crocker is a 41 yo WM with T1DM, Hashimoto thyroiditis, h/o OHTx 11/2014, and CKD stage 4 who was admitted on 3/11/18 for persistent diarrhea. He reported a 3 week history of diarrhea up to 15x/day. Associated symptoms including URI symptoms, coughing fits, and coughing syncope. Prograf level was 14.3. His post-heart transplant course has been complicated by AMR 4/2015, CMV, post-transplant restrictive cardiomyopathy, recurrent pleural effusions/ascites requiring monthly thoracenteses/paracenteses, VATS 1/11/18 with Pleur-X catheter (now removed), and CKD stage 4 with baseline sCr 2.6-3.0. Baseline -120s and baseline BP 90s-110s/60-70s. Upon admission he was started on IVF and diarrhea workup was initiated. On 3/12 he was noted to have decreased UOP despite fluids; NS was increased to 100cc/hr. He was scheduled for a colonoscopy on 3/13 however procedure was cancelled due to hypoxia and fever. He was transferred to the ICU for closer monitoring. He continued to have oliguria overnight. Bladder scan revealed 200cc of urine but patient refused osullivan catheter placement. Wife reports that patient always has a hard time urinating again after osullivan catheters are placed/removed so he refuses them. He remained hypoxic despite FiO2 70% and ABG revealed combined respiratory and metabolic acidosis with pH 7.17. He was started on BiPAP as well as a bicarb infusion at 50cc/hr. ABG with mild improvement. AM labs revealed K 6.2 and he was shifted and given kayexalate.Trialysis catheter was placed this morning and he subsequently developed hypotension and was started on levophed. He was  intubated later this morning. Nephrology consulted for CACHORRO. All history obtained by primary team and chart review as patient was intubated/sedated on exam. Consent for dialysis obtained by patient's wife and placed in chart. Of note, both of patient's parents were on dialysis.     Interval History:   SLED started around 7pm. No events overnight. UOP 603cc yesterday (160cc after RRT started). Pre-filter NS not documented into I/O: he's actually net negative only ~400cc/24h. FiO2 improved. Hourly intake 45cc/hr with levophed, propofol, fentanyl, and insulin.     Review of patient's allergies indicates:   Allergen Reactions    No known drug allergies      Current Facility-Administered Medications   Medication Frequency    0.9%  NaCl infusion (CRRT USE ONLY) Continuous    azithromycin (ZITHROMAX) 500 mg in sodium chloride 0.9% 250 mL IVPB Q24H    chlorhexidine 0.12 % solution 15 mL BID    dextrose 50% injection 12.5 g PRN    dextrose 50% injection 25 g PRN    escitalopram oxalate tablet 20 mg Daily    famotidine (PF) injection 20 mg Daily    fentaNYL 2500 mcg in 0.9% sodium chloride 250 mL infusion premix (titrating) Continuous    ganciclovir (CYTOVENE) 200 mg in sodium chloride 0.9% 100 mL IVPB Q24H    glucagon (human recombinant) injection 1 mg PRN    glucose chewable tablet 16 g PRN    glucose chewable tablet 24 g PRN    heparin (porcine) injection 5,000 Units Q8H    insulin regular (Humulin R) 100 Units in sodium chloride 0.9% 100 mL infusion Continuous    k phos di & mono-sod phos mono 250 mg tablet 2 tablet Q4H PRN    levothyroxine tablet 125 mcg Before breakfast    magnesium sulfate 2 g in dextrose 5% 50 ml IVPB PRN    norepinephrine 16 mg in dextrose 5 % 250 mL infusion Continuous    piperacillin-tazobactam 4.5 g in sodium chloride 0.9% 100 mL IVPB (ready to mix system) Q8H    posaconazole 300 mg in dextrose 5 % 150 mL infusion Daily    predniSONE tablet 2.5 mg Daily    propofol (DIPRIVAN)  10 mg/mL infusion Continuous    sodium chloride 0.45% 1,000 mL with sodium bicarbonate 1 mEq/mL (8.4 %) 75 mEq infusion Continuous    sodium chloride 0.9% flush 3 mL Q8H       Objective:     Vital Signs (Most Recent):  Temp: 96 °F (35.6 °C) (03/15/18 0733)  Pulse: (!) 128 (03/15/18 0903)  Resp: (!) 22 (03/15/18 0800)  BP: (!) 142/73 (03/15/18 0731)  SpO2: (!) 92 % (03/15/18 0903)  O2 Device (Oxygen Therapy): ventilator (03/15/18 0903) Vital Signs (24h Range):  Temp:  [96 °F (35.6 °C)-99.2 °F (37.3 °C)] 96 °F (35.6 °C)  Pulse:  [] 128  Resp:  [0-36] 22  SpO2:  [92 %-100 %] 92 %  BP: ()/(51-73) 142/73  Arterial Line BP: ()/(44-68) 99/44     Weight: 81 kg (178 lb 9.2 oz) (03/15/18 1000)  Body mass index is 27.97 kg/m².  Body surface area is 1.96 meters squared.    I/O last 3 completed shifts:  In: 2155.6 [P.O.:120; I.V.:1283.6; IV Piggyback:752]  Out: 4330 [Urine:605; Other:3725]    Physical Exam   Constitutional: He appears well-developed and well-nourished. He is sedated and intubated.   HENT:   Head: Normocephalic and atraumatic.   Neck: Neck supple. No thyromegaly present.   Cardiovascular: Normal rate and regular rhythm.    Pulmonary/Chest: He is intubated. He has rhonchi.   Abdominal: Soft. He exhibits distension.   Musculoskeletal: He exhibits no edema or deformity.   Skin: Skin is warm and dry. He is not diaphoretic.       Significant Labs:  ABGs:   Recent Labs  Lab 03/15/18  0349   PH 7.502*   PCO2 32.8*   HCO3 25.7   POCSATURATED 100   BE 3     CBC:   Recent Labs  Lab 03/15/18  0406   WBC 6.87   RBC 2.85*   HGB 8.5*   HCT 24.9*      MCV 87   MCH 29.8   MCHC 34.1     CMP:   Recent Labs  Lab 03/15/18  0406 03/15/18  0738   *  136*  136* 138*   CALCIUM 8.4*  8.4*  8.4* 8.6*   ALBUMIN 2.5*  2.5*  --    PROT 5.9*  --      142  142 143   K 4.2  4.2  4.2  4.2 4.7  4.7   CO2 25  25  25 27     104  104 104   BUN 13  13  13 10   CREATININE 1.3  1.3  1.3  1.2   ALKPHOS 209*  --    ALT 9*  --    AST 35  --    BILITOT 1.4*  --      All labs within the past 24 hours have been reviewed.     Significant Imaging:  Labs: Reviewed    Assessment/Plan:     Acute renal failure superimposed on stage 4 chronic kidney disease    - baseline sCr 2.6-3.0 consistent with CKD stage IV  - oliguric CACHORRO; likely multifactorial in etiology: pre-renal from diarrhea + decreased renal perfusion from prograf vasoconstriction + prograf nephrotoxicity. Also cannot rule out septic ATN  - later became anuric with no response to lasix bolus/gtt  - SLED started 3/14  - net negative 300cc yesterday. Acidosis and hyperkalemia resolved  - UOP only 150cc over last 12 hours  - vent settings improved but lungs sound awful this morning  - will continue SLED today with -350cc/hr (net 100-150cc/hr)  - recommend checking CVP when able  - keep osullivan catheter for now            Amairani Alegria, PGY-5  Nephrology Fellow  [facility]  Pager: [phone]    Patient seen and examined with Dr Alegria;   I have reviewed and agree with assessment and plan

## 2018-03-15 NOTE — SUBJECTIVE & OBJECTIVE
Interval History: Intubated/sedated. Awakens and follows commands when sedation decreased.     Continuous Infusions:   sodium chloride 0.9% 200 mL/hr at 03/15/18 0800    fentanyl 75 mcg/hr (03/15/18 0731)    insulin (HUMAN R) infusion (adults) 0.1 Units/hr (03/15/18 0636)    norepinephrine bitartrate-D5W 0.14 mcg/kg/min (03/15/18 0816)    propofol 45 mcg/kg/min (03/15/18 0817)    custom IV infusion builder Stopped (03/14/18 1300)     Scheduled Meds:   azithromycin (ZITHROMAX) 500 mg IVPB  500 mg Intravenous Q24H    chlorhexidine  15 mL Mouth/Throat BID    escitalopram oxalate  20 mg Per NG tube Daily    famotidine (PF)  20 mg Intravenous Daily    ganciclovir (CYTOVENE) IVPB  200 mg Intravenous Q24H    heparin (porcine)  5,000 Units Subcutaneous Q8H    piperacillin-tazobactam (ZOSYN) IVPB  4.5 g Intravenous Q8H    posaconazole (NOXAFIL) 300 mg in dextrose 5% IVPB  300 mg Intravenous Daily    predniSONE  2.5 mg Per NG tube Daily    sodium chloride 0.9%  3 mL Intravenous Q8H     PRN Meds:dextrose 50%, dextrose 50%, glucagon (human recombinant), glucose, glucose, k phos di & mono-sod phos mono, magnesium sulfate IVPB    Review of patient's allergies indicates:   Allergen Reactions    No known drug allergies      Objective:     Vital Signs (Most Recent):  Temp: 96 °F (35.6 °C) (03/15/18 0733)  Pulse: (!) 122 (03/15/18 0800)  Resp: (!) 22 (03/15/18 0800)  BP: 106/65 (03/14/18 1700)  SpO2: (!) 94 % (03/15/18 0800) Vital Signs (24h Range):  Temp:  [96 °F (35.6 °C)-99.2 °F (37.3 °C)] 96 °F (35.6 °C)  Pulse:  [] 122  Resp:  [0-40] 22  SpO2:  [94 %-100 %] 94 %  BP: ()/(45-65) 106/65  Arterial Line BP: ()/(44-68) 99/44     Patient Vitals for the past 72 hrs (Last 3 readings):   Weight   03/13/18 1545 81 kg (178 lb 9.2 oz)   03/13/18 0400 78.6 kg (173 lb 4.5 oz)     Body mass index is 27.97 kg/m².      Intake/Output Summary (Last 24 hours) at 03/15/18 0835  Last data filed at 03/15/18 0800    Gross per 24 hour   Intake          1693.05 ml   Output             4667 ml   Net         -2973.95 ml       Hemodynamic Parameters:  CVP:  [21 mmHg] 21 mmHg    Telemetry: ST    Physical Exam   Constitutional: He is sedated and intubated.   Appears ill   HENT:   Head: Normocephalic and atraumatic.   Eyes: Conjunctivae and EOM are normal. Pupils are equal, round, and reactive to light.   Neck: Normal range of motion. No JVD present. No thyromegaly present.   RIJ trialysis in place. CDI.    Cardiovascular: Regular rhythm.    Tachycardic   Pulmonary/Chest: Effort normal. He is intubated.   Intubated    Abdominal: Soft. Bowel sounds are normal.   + ascites   Musculoskeletal: Normal range of motion. He exhibits no edema.   Neurological: He is alert.   Skin: Skin is warm and dry. Capillary refill takes less than 2 seconds. There is pallor.        Psychiatric: He has a normal mood and affect. His behavior is normal. Judgment and thought content normal.       Significant Labs:  CBC:    Recent Labs  Lab 03/13/18  2334 03/14/18  0425 03/15/18  0406   WBC 6.12 5.78 6.87   RBC 2.54* 2.58* 2.85*   HGB 7.6* 7.7* 8.5*   HCT 23.4* 23.9* 24.9*    175 201   MCV 92 93 87   MCH 29.9 29.8 29.8   MCHC 32.5 32.2 34.1     BNP:    Recent Labs  Lab 03/11/18  1922 03/13/18  0815   * 359*     CMP:    Recent Labs  Lab 03/13/18  2334 03/14/18  0425  03/14/18  2201 03/15/18  0045 03/15/18  0406 03/15/18  0738   GLU  --  236*  < > 224* 166* 136*  136*  136* 138*   CALCIUM  --  8.3*  < > 8.5* 8.6* 8.4*  8.4*  8.4* 8.6*   ALBUMIN 2.7* 2.8*  --  2.5*  --  2.5*  2.5*  --    PROT 6.0 6.4  --   --   --  5.9*  --    NA  --  133*  < > 138 140 142  142  142 143   K  --  6.2*  < > 4.3 4.3  4.3 4.2  4.2  4.2  4.2 4.7  4.7   CO2  --  15*  < > 21* 23 25  25  25 27   CL  --  101  < > 103 104 104  104  104 104   BUN  --  43*  < > 28* 19 13  13  13 10   CREATININE  --  3.7*  < > 2.5* 1.9* 1.3  1.3  1.3 1.2   ALKPHOS 286* 259*  --    --   --  209*  --    ALT 9* 9*  --   --   --  9*  --    AST 21 28  --   --   --  35  --    BILITOT 1.1* 1.4*  --   --   --  1.4*  --    < > = values in this interval not displayed.   Coagulation:   No results for input(s): PT, INR, APTT in the last 168 hours.  LDH:  No results for input(s): LDH in the last 72 hours.  Microbiology:  Microbiology Results (last 7 days)     Procedure Component Value Units Date/Time    Culture, Respiratory with Gram Stain [[id number]] Collected:  03/14/18 1137    Order Status:  Completed Specimen:  Respiratory from BAL, TRIPP Updated:  03/14/18 2302     Gram Stain (Respiratory) No WBC's     Gram Stain (Respiratory) No organisms seen    Blood culture [[id number]] Collected:  03/13/18 1637    Order Status:  Completed Specimen:  Blood from Peripheral, Antecubital, Left Updated:  03/14/18 2012     Blood Culture, Routine No Growth to date     Blood Culture, Routine No Growth to date    Blood culture [[id number]] Collected:  03/13/18 1701    Order Status:  Completed Specimen:  Blood from Peripheral, Forearm, Left Updated:  03/14/18 2012     Blood Culture, Routine No Growth to date     Blood Culture, Routine No Growth to date    Stool culture **CANNOT BE ORDERED STAT** [[id number]] Collected:  03/11/18 1923    Order Status:  Completed Specimen:  Stool from Stool Updated:  03/14/18 1549     Stool Culture No enteric hank     Stool Culture No Salmonella,Shigella,Vibrio,Campylobacter,Yersinia isolated.    Culture, Fluid  (Aerobic) [[id number]] Collected:  03/14/18 1137    Order Status:  Canceled Specimen:  Bronchial Wash from Amniotic Fluid Updated:  03/14/18 1416    AFB Culture & Smear [[id number]] Collected:  03/14/18 1137    Order Status:  Sent Specimen:  Bronchial Wash from Amniotic Fluid Updated:  03/14/18 1416    Fungus culture [[id number]] Collected:  03/14/18 1137    Order Status:  Sent Specimen:  Bronchial Wash from Amniotic Fluid Updated:  03/14/18 1416    Cryptococcal antigen [[id number]]  Collected:  03/14/18 0425    Order Status:  Completed Specimen:  Blood from Blood Updated:  03/14/18 1217     Cryptococcal Ag, Blood Negative    Culture, Respiratory with Gram Stain [[id number]] Collected:  03/13/18 1441    Order Status:  Completed Specimen:  Respiratory from Sputum, Expectorated Updated:  03/14/18 0931     Respiratory Culture Normal respiratory hank     Gram Stain (Respiratory) <10 epithelial cells per low power field.     Gram Stain (Respiratory) Rare WBC's     Gram Stain (Respiratory) Moderate Gram positive cocci     Gram Stain (Respiratory) Many Gram negative rods    Culture, Respiratory with Gram Stain [[id number]] Collected:  03/13/18 1701    Order Status:  Completed Specimen:  Respiratory from Sputum, Expectorated Updated:  03/13/18 2224     Respiratory Culture Specimen inadequate - culture not performed     Gram Stain (Respiratory) >10epis/lfp and <than many WBC's     Gram Stain (Respiratory) Predominance of oropharyngeal hank. Please recollect.    Respiratory Viral Panel by PCR [facility]; Nasal Swab [[id number]] Collected:  03/13/18 1733    Order Status:  Sent Specimen:  Respiratory Updated:  03/13/18 1900    Clostridium difficile EIA [[id number]] Collected:  03/11/18 1923    Order Status:  Completed Specimen:  Stool from Stool Updated:  03/11/18 2355     C. diff Antigen Negative     C difficile Toxins A+B, EIA Negative     Comment: Testing not recommended for children <24 months old.       Stool culture [[id number]]     Order Status:  Completed Specimen:  Stool from Stool     Stool culture [[id number]]     Order Status:  Canceled Specimen:  Stool from Stool     E. coli 0157 antigen [[id number]] Collected:  03/11/18 1923    Order Status:  Canceled Specimen:  Stool from Stool Updated:  03/11/18 2001        I have reviewed all pertinent labs within the past 24 hours.    Estimated Creatinine Clearance: 80.1 mL/min (based on SCr of 1.2 mg/dL).    Diagnostic Results:  I have reviewed all pertinent  imaging results/findings within the past 24 hours.

## 2018-03-15 NOTE — SUBJECTIVE & OBJECTIVE
"Interval HPI:   Overnight events: none  Eating:   NPO  Nausea: No  Hypoglycemia and intervention: No  Fever: No  TPN and/or TF: No  If yes, type of TF/TPN and rate: n/a    BP (!) 142/73   Pulse (!) 128   Temp 96 °F (35.6 °C) (Axillary)   Resp (!) 22   Ht 5' 7" (1.702 m)   Wt 81 kg (178 lb 9.2 oz)   SpO2 (!) 92%   BMI 27.97 kg/m²     Labs Reviewed and Include      Recent Labs  Lab 03/15/18  0406 03/15/18  0738   *  136*  136* 138*   CALCIUM 8.4*  8.4*  8.4* 8.6*   ALBUMIN 2.5*  2.5*  --    PROT 5.9*  --      142  142 143   K 4.2  4.2  4.2  4.2 4.7  4.7   CO2 25  25  25 27     104  104 104   BUN 13  13  13 10   CREATININE 1.3  1.3  1.3 1.2   ALKPHOS 209*  --    ALT 9*  --    AST 35  --    BILITOT 1.4*  --      Lab Results   Component Value Date    WBC 6.87 03/15/2018    HGB 8.5 (L) 03/15/2018    HCT 24.9 (L) 03/15/2018    MCV 87 03/15/2018     03/15/2018       Recent Labs  Lab 03/12/18  0443   TSH 0.101*   FREET4 1.33     Lab Results   Component Value Date    HGBA1C 6.9 (H) 02/11/2018       Nutritional status:   Body mass index is 27.97 kg/m².  Lab Results   Component Value Date    ALBUMIN 2.5 (L) 03/15/2018    ALBUMIN 2.5 (L) 03/15/2018    ALBUMIN 2.5 (L) 03/14/2018     Lab Results   Component Value Date    PREALBUMIN 5 (L) 03/15/2018    PREALBUMIN 18 (L) 03/24/2015    PREALBUMIN 19 (L) 12/17/2014       Estimated Creatinine Clearance: 80.1 mL/min (based on SCr of 1.2 mg/dL).    Accu-Checks  Recent Labs      03/15/18   0013  03/15/18   0047  03/15/18   0107  03/15/18   0229  03/15/18   0343  03/15/18   0431  03/15/18   0557  03/15/18   0637  03/15/18   0742  03/15/18   0837   POCTGLUCOSE  197*  182*  167*  131*  148*  143*  145*  141*  127*  148*       Current Medications and/or Treatments Impacting Glycemic Control  Immunotherapy:  Immunosuppressants     None        Steroids:   Hormones     Start     Stop Route Frequency Ordered    03/14/18 1122  predniSONE tablet " 2.5 mg      -- PER NG TUBE Daily 03/14/18 1122        Pressors:    Autonomic Drugs     Start     Stop Route Frequency Ordered    03/14/18 1045  norepinephrine 16 mg in dextrose 5 % 250 mL infusion     Question Answer Comment   Titrate by: (in mcg/kg/min) 0.02    Titrate interval: (in minutes) 2    Titrate to maintain: (MAP or SBP) MAP    Greater than: (in mmHg) 60    Maximum dose: (in mcg/kg/min) 3        -- IV Continuous 03/14/18 0937        Hyperglycemia/Diabetes Medications: Antihyperglycemics     Start     Stop Route Frequency Ordered    03/14/18 1630  insulin regular (Humulin R) 100 Units in sodium chloride 0.9% 100 mL infusion     Question:  Insulin Rate Adjustment (DO NOT MODIFY ANSWER)  Answer:  [email or web address]    -- IV Continuous 03/14/18 1539

## 2018-03-15 NOTE — PROGRESS NOTES
[facility]  Pulm/Critical Care - Medicine  Progress Note    Patient Name: Lv Crocker  MRN: [id number]  Admission Date: 3/11/2018  Hospital Length of Stay: 3 days  Code Status: Full Code  Attending Provider: Jose A Lloyd MD  Primary Care Provider: Philippe Mohr MD   Principal Problem: Acute respiratory failure with hypoxia    Subjective:   The patient was seen and examined. Remains on the vent overnight. No fevers noted. WBC count at 6.8 K.     Review of Systems   Unable to obtain  Objective:     Vital Signs (Most Recent):  Temp: 96 °F (35.6 °C) (03/15/18 0733)  Pulse: (!) 132 (03/15/18 1132)  Resp: 16 (03/15/18 1132)  BP: (!) 142/73 (03/15/18 0731)  SpO2: (!) 94 % (03/15/18 1132) Vital Signs (24h Range):  Temp:  [96 °F (35.6 °C)-99.2 °F (37.3 °C)] 96 °F (35.6 °C)  Pulse:  [] 132  Resp:  [0-36] 16  SpO2:  [92 %-100 %] 94 %  BP: ()/(51-73) 142/73  Arterial Line BP: ()/(44-68) 99/44     Weight: 81 kg (178 lb 9.2 oz)  Body mass index is 27.97 kg/m².      Intake/Output Summary (Last 24 hours) at 03/15/18 1150  Last data filed at 03/15/18 1000   Gross per 24 hour   Intake          1771.05 ml   Output             4963 ml   Net         -3191.95 ml       Physical Exam   Constitutional: He is oriented to person, place, and time. He appears well-developed and well-nourished.   Sedated on mechanical ventilation   HENT:   Head: Normocephalic and atraumatic.   Neck: Normal range of motion. Neck supple. No tracheal deviation present. No thyromegaly present.   Cardiovascular:   tachycardic   Pulmonary/Chest:   On the vent    Abdominal: Soft.   Musculoskeletal: He exhibits no edema.   Neurological: He is alert and oriented to person, place, and time.   Skin: Skin is warm and dry.       Vents:  Vent Mode: A/C (03/15/18 1132)  Ventilator Initiated: Yes (03/14/18 0932)  Set Rate: 18 bmp (03/15/18 1132)  Vt Set: 450 mL (03/15/18 1132)  Pressure Support: 0 cmH20 (03/15/18  1132)  PEEP/CPAP: 5 cmH20 (03/15/18 1132)  Oxygen Concentration (%): 50 (03/15/18 1132)  Peak Airway Pressure: 39 cmH2O (03/15/18 1132)  Plateau Pressure: 37 cmH20 (03/15/18 1132)  Total Ve: 8.97 mL (03/15/18 1132)  F/VT Ratio<105 (RSBI): (!) 29.36 (03/15/18 1132)    Lines/Drains/Airways     Central Venous Catheter Line                 Trialysis (Dialysis) Catheter 03/14/18 0813 right internal jugular 1 day          Drain                 Urethral Catheter 03/14/18 1125 Latex 1 day         NG/OG Tube 03/14/18 1300 [place] mouth less than 1 day          Airway                 Airway - Non-Surgical 03/14/18 0930 Endotracheal Tube 1 day          Arterial Line                 Arterial Line 03/14/18 1600 Right Femoral less than 1 day          Peripheral Intravenous Line                 Peripheral IV - Single Lumen 03/11/18 1931 Left Hand 3 days         Peripheral IV - Single Lumen 03/13/18 1600 Left Antecubital 1 day         Peripheral IV - Single Lumen 03/14/18 1736 Right Forearm less than 1 day                Significant Labs:    CBC/Anemia Profile:    Recent Labs  Lab 03/13/18  2334 03/14/18  0425 03/15/18  0406   WBC 6.12 5.78 6.87   HGB 7.6* 7.7* 8.5*   HCT 23.4* 23.9* 24.9*    175 201   MCV 92 93 87   RDW 13.5 13.5 13.5        Chemistries:    Recent Labs  Lab 03/13/18  2334 03/14/18  0425  03/14/18  2201 03/15/18  0045 03/15/18  0406 03/15/18  0738   NA  --  133*  < > 138 140 142  142  142 143   K  --  6.2*  < > 4.3 4.3  4.3 4.2  4.2  4.2  4.2 4.7  4.7   CL  --  101  < > 103 104 104  104  104 104   CO2  --  15*  < > 21* 23 25  25  25 27   BUN  --  43*  < > 28* 19 13  13  13 10   CREATININE  --  3.7*  < > 2.5* 1.9* 1.3  1.3  1.3 1.2   CALCIUM  --  8.3*  < > 8.5* 8.6* 8.4*  8.4*  8.4* 8.6*   ALBUMIN 2.7* 2.8*  --  2.5*  --  2.5*  2.5*  --    PROT 6.0 6.4  --   --   --  5.9*  --    BILITOT 1.1* 1.4*  --   --   --  1.4*  --    ALKPHOS 286* 259*  --   --   --  209*  --    ALT 9* 9*  --   --    --  9*  --    AST 21 28  --   --   --  35  --    MG 1.3* 1.6  --   --  1.5* 2.2 1.8   PHOS  --   --   --  2.4*  --  1.4*  --    < > = values in this interval not displayed.        Assessment/Plan:   1. ARDS vs. pulmonary edema  2. CACHORRO      Worsening oxygenation and ventilation yest requiring endotracheal intubation. The involvement is asymmetrical with L>>>R. The patient has developed new shock requiring levo. He is on broad spectrum abs and antifungals. No results from BAL have returned as yet. Plan:    -Abs per ID team  -LTV ventilation at 6 cc/Kg of IBW. Peak pressures < 30  -Keep O>>I  -Check ScVO2 and consider placing swan  -DVT prophylaxis    We will continue to follow with you. Please call for any questions.   Geri Stringer MD  Critical Care - Medicine  [facility]

## 2018-03-15 NOTE — NURSING
Transplant Coordinator Rounds Report: Attended interdisciplinary rounds this morning with the transplant team including SW, physicians, fellows,  mid-level providers, and transplant coordinators.  Discussed plan of care but no discharge date or plans yet.   Home

## 2018-03-15 NOTE — PLAN OF CARE
Problem: Patient Care Overview  Goal: Plan of Care Review  Outcome: Revised  POC discussed with patient & wife at bedside. RSI implemented. Palmer placed & lasix given-- patient remains oliguric; CRRT ininatied. Patient appears asleep & more relaxed on Propofol & Fentanyl. Levophed titrated to meet MAP goals. Insulin gtt started for BG control. Will continue to monitor.

## 2018-03-15 NOTE — PROGRESS NOTES
[facility]  Infectious Disease  Progress Note    Patient Name: Lv Crocker  MRN: [id number]  Admission Date: 3/11/2018  Length of Stay: 3 days  Attending Physician: Jose A Lloyd MD  Primary Care Provider: Philippe Mohr MD    Isolation Status: No active isolations  Assessment/Plan:      * Acute respiratory failure with hypoxia    41yo man w/a history of DM1, Hashimoto's thyroiditis, and ICM (s/p OHT 11/18/2014, CMV D+/R+, steroid induction, on maintenance tacro/MMF/pred; c/b early hemorrhagic tamponade requiring washout, delayed closure, and pericardial window and subsequent AF w/RVR, and CACHORRO; late course c/b ABMR 4/2015 s/p rituxan, PLEX, and IVIG; subsequent C.diff/CMV reactivation, restrictive cardiomyopathy with recurrent pleural effusions s/p VATS/pleurex catheter 1/2018 with removal 2/2018 and ascites requiring repeated LVP, and CKD) who was admitted on 3/11/2018 with ~3wks of worsening profuse non-bloody diarrhea, associated cramping abdominal pain, N/V, and a week of acute on chronic cough, SOB, and fevers and has been found to have multifocal pneumonia on CT chest. He decompensated on 3/13 with acute hypoxic respiratory failure requiring intubation and is critically ill. Differential for his cough/SOB and diarrhea includes some unifying diagnoses (Legionella, disseminated histoplasmosis, disseminated CMV infection, Strongyloidiasis, community acquired viral infection) but it is more likely that he has 2 processes driving his pulmonary and GI symptoms (such as a bacterial OI or IFI causing his multifocal pneumonia with accompanying GI illness such as CMV).     - will continue empiric vanc/zosyn/azithromycin, posaconazole, and IV GCV induction for broad coverage  - have sent noninvasive workup for pulmonary issues, remaining tests include: urine histo/blasto antigens, RVP, urine legionella, and Quantiferon  - have sent Strongyloides ab (of note, O&P negative), adenovirus EIA,  and [place] stool PCR panel to complete noninvasive workup of diarrhea  - await pending blood, BAL, and stool cx   - flex sig for samples to exclude CMV invasive disease or other pathogens would be helpful but patient is not stable enough for this procedure currently; will empirically treat CMV for now and reassess later            Anticipated Disposition: pending improvement    Thank you for your consult. I will follow-up with patient. Please contact us if you have any additional questions.     Maribell Eric MD  Transplant ID Attending  [phone]    Maribell Eric MD  Infectious Disease  [facility]    Subjective:     Principal Problem:Acute respiratory failure with hypoxia    HPI: No notes on file  Interval History: Remains intubated and on low-dose pressors -- likely due to AI and stress dose steroids to be started. Afebrile now. Cultures unremarkable to date.     Review of Systems   Unable to perform ROS: Intubated     Objective:     Vital Signs (Most Recent):  Temp: 97.4 °F (36.3 °C) (03/15/18 1200)  Pulse: (!) 122 (03/15/18 1728)  Resp: 20 (03/15/18 1300)  BP: (!) 142/73 (03/15/18 0731)  SpO2: 100 % (03/15/18 1728) Vital Signs (24h Range):  Temp:  [96 °F (35.6 °C)-99.2 °F (37.3 °C)] 97.4 °F (36.3 °C)  Pulse:  [] 122  Resp:  [0-22] 20  SpO2:  [92 %-100 %] 100 %  BP: (142)/(73) 142/73  Arterial Line BP: ()/(44-68) 112/59     Weight: 81 kg (178 lb 9.2 oz)  Body mass index is 27.97 kg/m².    Estimated Creatinine Clearance: 96.1 mL/min (based on SCr of 1 mg/dL).    Physical Exam   Constitutional: He appears well-developed and well-nourished. No distress.   Chronically ill appearing. Intubated.   HENT:   Head: Atraumatic.   Mouth/Throat: Oropharynx is clear and moist. No oropharyngeal exudate.   Eyes: Conjunctivae are normal. Pupils are equal, round, and reactive to light. No scleral icterus.   Neck: Neck supple.   Cardiovascular: Normal rate and regular rhythm.  Exam reveals no  friction rub.    No murmur heard.  Pulmonary/Chest: No respiratory distress. He has no wheezes. He has rales. He exhibits no tenderness.   Intubated.   Abdominal: Soft. Bowel sounds are normal. He exhibits no distension. There is no tenderness. There is no rebound and no guarding.   Musculoskeletal: Normal range of motion. He exhibits no edema.   Lymphadenopathy:     He has no cervical adenopathy.   Neurological:   Intubated/sedated.   Skin: No rash noted. No erythema.       Significant Labs:   CBC:     Recent Labs  Lab 03/13/18  2334 03/14/18  0425 03/15/18  0406   WBC 6.12 5.78 6.87   HGB 7.6* 7.7* 8.5*   HCT 23.4* 23.9* 24.9*    175 201     CMP:   Recent Labs  Lab 03/13/18  2334 03/14/18  0425  03/14/18  2201  03/15/18  0406  03/15/18  1210 03/15/18  1351 03/15/18  1639   NA  --  133*  < > 138  < > 142  142  142  < > 142 142 142   K  --  6.2*  < > 4.3  < > 4.2  4.2  4.2  4.2  < > 4.3  4.3 4.2 4.5  4.5   CL  --  101  < > 103  < > 104  104  104  < > 102 102 104   CO2  --  15*  < > 21*  < > 25  25  25  < > 26 26 25   GLU  --  236*  < > 224*  < > 136*  136*  136*  < > 165* 139* 149*   BUN  --  43*  < > 28*  < > 13  13  13  < > 7 6 6   CREATININE  --  3.7*  < > 2.5*  < > 1.3  1.3  1.3  < > 1.2 1.1 1.0   CALCIUM  --  8.3*  < > 8.5*  < > 8.4*  8.4*  8.4*  < > 8.9 8.7 8.6*   PROT 6.0 6.4  --   --   --  5.9*  --   --   --   --    ALBUMIN 2.7* 2.8*  --  2.5*  --  2.5*  2.5*  --   --  2.7*  --    BILITOT 1.1* 1.4*  --   --   --  1.4*  --   --   --   --    ALKPHOS 286* 259*  --   --   --  209*  --   --   --   --    AST 21 28  --   --   --  35  --   --   --   --    ALT 9* 9*  --   --   --  9*  --   --   --   --    ANIONGAP  --  17*  < > 14  < > 13  13  13  < > 14 14 13   EGFRNONAA  --  18.7*  < > 30.1*  < > >60.0  >60.0  >60.0  < > >60.0 >60.0 >60.0   < > = values in this interval not displayed.    Significant Imaging: I have reviewed all pertinent imaging results/findings within the past 24  hours.     CT chest:  Status post heart transplantation with new multifocal subsegmental consolidation throughout both lungs.  Similar findings were present on the CT dated 12/12/2017 with partial improvement on 02/14/2018 therefore we doubt malignancy.  Differential considerations include multifocal infectious pneumonia, hemorrhage, or aspiration.  We consider pulmonary edema due to abnormal capillary permeability or cardiac decompensation less likely.  Lymphoma could present similarly although felt less likely due to improvement on CT of 2/14/2018.    Continued moderate sized incompletely dependent right pleural effusion with rounded consolidation in the right lower lobe which probably represents rounded atelectasis, similar to prior, new from 7/19/2016.    Redemonstration of mild scattered ascites in the upper abdomen.     Microbiology:  3/11 stool WBC negative  3/11 stool cx: negative  3/11 O&P negative  3/11 C.diff negative  3/11 Giardia antigen negative  3/11 Cryptosporidium antigen negative  3/11 rotavirus negative  3/11 norovirus negative  3/12 CMV quant negative  3/13 blood cx: NGTD  3/13 sputum cx: NGTD  3/13 aspergillus antigen negative  3/13 fungitell pending  3/13 urine histo pending  3/13 urine blasto pending  3/13 crypto antigen negative  3/13 urine legionella pending  3/13 RVP pending  3/13 adenovirus stool EIA pending  3/13 Strongyloides ab pending  3/13 [place] stool pathogens panel pending  3/14 quantiferon pending

## 2018-03-15 NOTE — SUBJECTIVE & OBJECTIVE
Interval History:   SLED started around 7pm. No events overnight. UOP 603cc yesterday (160cc after RRT started). Pre-filter NS not documented into I/O: he's actually net negative only ~400cc/24h. FiO2 improved. Hourly intake 45cc/hr with levophed, propofol, fentanyl, and insulin.     Review of patient's allergies indicates:   Allergen Reactions    No known drug allergies      Current Facility-Administered Medications   Medication Frequency    0.9%  NaCl infusion (CRRT USE ONLY) Continuous    azithromycin (ZITHROMAX) 500 mg in sodium chloride 0.9% 250 mL IVPB Q24H    chlorhexidine 0.12 % solution 15 mL BID    dextrose 50% injection 12.5 g PRN    dextrose 50% injection 25 g PRN    escitalopram oxalate tablet 20 mg Daily    famotidine (PF) injection 20 mg Daily    fentaNYL 2500 mcg in 0.9% sodium chloride 250 mL infusion premix (titrating) Continuous    ganciclovir (CYTOVENE) 200 mg in sodium chloride 0.9% 100 mL IVPB Q24H    glucagon (human recombinant) injection 1 mg PRN    glucose chewable tablet 16 g PRN    glucose chewable tablet 24 g PRN    heparin (porcine) injection 5,000 Units Q8H    insulin regular (Humulin R) 100 Units in sodium chloride 0.9% 100 mL infusion Continuous    k phos di & mono-sod phos mono 250 mg tablet 2 tablet Q4H PRN    levothyroxine tablet 125 mcg Before breakfast    magnesium sulfate 2 g in dextrose 5% 50 ml IVPB PRN    norepinephrine 16 mg in dextrose 5 % 250 mL infusion Continuous    piperacillin-tazobactam 4.5 g in sodium chloride 0.9% 100 mL IVPB (ready to mix system) Q8H    posaconazole 300 mg in dextrose 5 % 150 mL infusion Daily    predniSONE tablet 2.5 mg Daily    propofol (DIPRIVAN) 10 mg/mL infusion Continuous    sodium chloride 0.45% 1,000 mL with sodium bicarbonate 1 mEq/mL (8.4 %) 75 mEq infusion Continuous    sodium chloride 0.9% flush 3 mL Q8H       Objective:     Vital Signs (Most Recent):  Temp: 96 °F (35.6 °C) (03/15/18 0733)  Pulse: (!) 128  (03/15/18 0903)  Resp: (!) 22 (03/15/18 0800)  BP: (!) 142/73 (03/15/18 0731)  SpO2: (!) 92 % (03/15/18 0903)  O2 Device (Oxygen Therapy): ventilator (03/15/18 0903) Vital Signs (24h Range):  Temp:  [96 °F (35.6 °C)-99.2 °F (37.3 °C)] 96 °F (35.6 °C)  Pulse:  [] 128  Resp:  [0-36] 22  SpO2:  [92 %-100 %] 92 %  BP: ()/(51-73) 142/73  Arterial Line BP: ()/(44-68) 99/44     Weight: 81 kg (178 lb 9.2 oz) (03/15/18 1000)  Body mass index is 27.97 kg/m².  Body surface area is 1.96 meters squared.    I/O last 3 completed shifts:  In: 2155.6 [P.O.:120; I.V.:1283.6; IV Piggyback:752]  Out: 4330 [Urine:605; Other:3725]    Physical Exam   Constitutional: He appears well-developed and well-nourished. He is sedated and intubated.   HENT:   Head: Normocephalic and atraumatic.   Neck: Neck supple. No thyromegaly present.   Cardiovascular: Normal rate and regular rhythm.    Pulmonary/Chest: He is intubated. He has rhonchi.   Abdominal: Soft. He exhibits distension.   Musculoskeletal: He exhibits no edema or deformity.   Skin: Skin is warm and dry. He is not diaphoretic.       Significant Labs:  ABGs:   Recent Labs  Lab 03/15/18  0349   PH 7.502*   PCO2 32.8*   HCO3 25.7   POCSATURATED 100   BE 3     CBC:   Recent Labs  Lab 03/15/18  0406   WBC 6.87   RBC 2.85*   HGB 8.5*   HCT 24.9*      MCV 87   MCH 29.8   MCHC 34.1     CMP:   Recent Labs  Lab 03/15/18  0406 03/15/18  0738   *  136*  136* 138*   CALCIUM 8.4*  8.4*  8.4* 8.6*   ALBUMIN 2.5*  2.5*  --    PROT 5.9*  --      142  142 143   K 4.2  4.2  4.2  4.2 4.7  4.7   CO2 25  25  25 27     104  104 104   BUN 13  13  13 10   CREATININE 1.3  1.3  1.3 1.2   ALKPHOS 209*  --    ALT 9*  --    AST 35  --    BILITOT 1.4*  --      All labs within the past 24 hours have been reviewed.     Significant Imaging:  Labs: Reviewed

## 2018-03-15 NOTE — ASSESSMENT & PLAN NOTE
- C. Diff -ve. Other stool studies pending  - CMV DNA and Norovirus PCR pending  - Strongyloides ab, adenovirus EIA, and Hay stool PCR panel pending  - Consulted GI - plan upper and lower endoscopy once more stable.

## 2018-03-15 NOTE — SUBJECTIVE & OBJECTIVE
Interval History: Remains intubated and on low-dose pressors -- likely due to AI and stress dose steroids to be started. Afebrile now. Cultures unremarkable to date.     Review of Systems   Unable to perform ROS: Intubated     Objective:     Vital Signs (Most Recent):  Temp: 97.4 °F (36.3 °C) (03/15/18 1200)  Pulse: (!) 122 (03/15/18 1728)  Resp: 20 (03/15/18 1300)  BP: (!) 142/73 (03/15/18 0731)  SpO2: 100 % (03/15/18 1728) Vital Signs (24h Range):  Temp:  [96 °F (35.6 °C)-99.2 °F (37.3 °C)] 97.4 °F (36.3 °C)  Pulse:  [] 122  Resp:  [0-22] 20  SpO2:  [92 %-100 %] 100 %  BP: (142)/(73) 142/73  Arterial Line BP: ()/(44-68) 112/59     Weight: 81 kg (178 lb 9.2 oz)  Body mass index is 27.97 kg/m².    Estimated Creatinine Clearance: 96.1 mL/min (based on SCr of 1 mg/dL).    Physical Exam   Constitutional: He appears well-developed and well-nourished. No distress.   Chronically ill appearing. Intubated.   HENT:   Head: Atraumatic.   Mouth/Throat: Oropharynx is clear and moist. No oropharyngeal exudate.   Eyes: Conjunctivae are normal. Pupils are equal, round, and reactive to light. No scleral icterus.   Neck: Neck supple.   Cardiovascular: Normal rate and regular rhythm.  Exam reveals no friction rub.    No murmur heard.  Pulmonary/Chest: No respiratory distress. He has no wheezes. He has rales. He exhibits no tenderness.   Intubated.   Abdominal: Soft. Bowel sounds are normal. He exhibits no distension. There is no tenderness. There is no rebound and no guarding.   Musculoskeletal: Normal range of motion. He exhibits no edema.   Lymphadenopathy:     He has no cervical adenopathy.   Neurological:   Intubated/sedated.   Skin: No rash noted. No erythema.       Significant Labs:   CBC:     Recent Labs  Lab 03/13/18  2334 03/14/18  0425 03/15/18  0406   WBC 6.12 5.78 6.87   HGB 7.6* 7.7* 8.5*   HCT 23.4* 23.9* 24.9*    175 201     CMP:   Recent Labs  Lab 03/13/18  2334 03/14/18  0425  03/14/18  2201   03/15/18  0406  03/15/18  1210 03/15/18  1351 03/15/18  1639   NA  --  133*  < > 138  < > 142  142  142  < > 142 142 142   K  --  6.2*  < > 4.3  < > 4.2  4.2  4.2  4.2  < > 4.3  4.3 4.2 4.5  4.5   CL  --  101  < > 103  < > 104  104  104  < > 102 102 104   CO2  --  15*  < > 21*  < > 25  25  25  < > 26 26 25   GLU  --  236*  < > 224*  < > 136*  136*  136*  < > 165* 139* 149*   BUN  --  43*  < > 28*  < > 13  13  13  < > 7 6 6   CREATININE  --  3.7*  < > 2.5*  < > 1.3  1.3  1.3  < > 1.2 1.1 1.0   CALCIUM  --  8.3*  < > 8.5*  < > 8.4*  8.4*  8.4*  < > 8.9 8.7 8.6*   PROT 6.0 6.4  --   --   --  5.9*  --   --   --   --    ALBUMIN 2.7* 2.8*  --  2.5*  --  2.5*  2.5*  --   --  2.7*  --    BILITOT 1.1* 1.4*  --   --   --  1.4*  --   --   --   --    ALKPHOS 286* 259*  --   --   --  209*  --   --   --   --    AST 21 28  --   --   --  35  --   --   --   --    ALT 9* 9*  --   --   --  9*  --   --   --   --    ANIONGAP  --  17*  < > 14  < > 13  13  13  < > 14 14 13   EGFRNONAA  --  18.7*  < > 30.1*  < > >60.0  >60.0  >60.0  < > >60.0 >60.0 >60.0   < > = values in this interval not displayed.    Significant Imaging: I have reviewed all pertinent imaging results/findings within the past 24 hours.     CT chest:  Status post heart transplantation with new multifocal subsegmental consolidation throughout both lungs.  Similar findings were present on the CT dated 12/12/2017 with partial improvement on 02/14/2018 therefore we doubt malignancy.  Differential considerations include multifocal infectious pneumonia, hemorrhage, or aspiration.  We consider pulmonary edema due to abnormal capillary permeability or cardiac decompensation less likely.  Lymphoma could present similarly although felt less likely due to improvement on CT of 2/14/2018.    Continued moderate sized incompletely dependent right pleural effusion with rounded consolidation in the right lower lobe which probably represents rounded atelectasis,  similar to prior, new from 7/19/2016.    Redemonstration of mild scattered ascites in the upper abdomen.     Microbiology:  3/11 stool WBC negative  3/11 stool cx: negative  3/11 O&P negative  3/11 C.diff negative  3/11 Giardia antigen negative  3/11 Cryptosporidium antigen negative  3/11 rotavirus negative  3/11 norovirus negative  3/12 CMV quant negative  3/13 blood cx: NGTD  3/13 sputum cx: NGTD  3/13 aspergillus antigen negative  3/13 fungitell pending  3/13 urine histo pending  3/13 urine blasto pending  3/13 crypto antigen negative  3/13 urine legionella pending  3/13 RVP pending  3/13 adenovirus stool EIA pending  3/13 Strongyloides ab pending  3/13 [facility] stool pathogens panel pending  3/14 quantiferon pending

## 2018-03-16 PROBLEM — R65.21 SEPTIC SHOCK: Status: ACTIVE | Noted: 2018-01-01

## 2018-03-16 PROBLEM — A41.9 SEPTIC SHOCK: Status: ACTIVE | Noted: 2018-01-01

## 2018-03-16 NOTE — SUBJECTIVE & OBJECTIVE
Interval History: Intubated/sedated.     Continuous Infusions:   sodium chloride 0.9% 200 mL/hr at 03/16/18 1158    fentanyl 10 mL/hr at 03/16/18 1200    insulin (HUMAN R) infusion (adults) 0.5 Units/hr (03/16/18 1213)    midazolam (VERSED) infusion (titrating) 10 mg/hr (03/16/18 1200)    norepinephrine bitartrate-D5W 0.25 mcg/kg/min (03/16/18 1200)     Scheduled Meds:   azithromycin (ZITHROMAX) 500 mg IVPB  500 mg Intravenous Q24H    chlorhexidine  15 mL Mouth/Throat BID    escitalopram oxalate  20 mg Per NG tube Daily    famotidine (PF)  20 mg Intravenous Daily    ganciclovir (CYTOVENE) IVPB  200 mg Intravenous Q24H    heparin (porcine)  5,000 Units Subcutaneous Q8H    hydrocortisone sodium succinate  100 mg Intravenous Q8H    levothyroxine  125 mcg Per NG tube Before breakfast    piperacillin-tazobactam (ZOSYN) IVPB  4.5 g Intravenous Q8H    posaconazole (NOXAFIL) 300 mg in dextrose 5% IVPB  300 mg Intravenous Daily    ribavirin  600 mg Oral BID    sodium chloride 0.9%  3 mL Intravenous Q8H     PRN Meds:acetaminophen, dextrose 50%, dextrose 50%, glucagon (human recombinant), glucose, glucose, k phos di & mono-sod phos mono    Review of patient's allergies indicates:   Allergen Reactions    No known drug allergies      Objective:     Vital Signs (Most Recent):  Temp: 97.2 °F (36.2 °C) (03/16/18 1100)  Pulse: (!) 117 (03/16/18 1149)  Resp: (!) 0 (03/16/18 0900)  BP: (!) 142/73 (03/15/18 0731)  SpO2: 100 % (03/16/18 1149) Vital Signs (24h Range):  Temp:  [96.9 °F (36.1 °C)-98.2 °F (36.8 °C)] 97.2 °F (36.2 °C)  Pulse:  [105-135] 117  Resp:  [0-22] 0  SpO2:  [95 %-100 %] 100 %  Arterial Line BP: (100-150)/(41-77) 108/41     Patient Vitals for the past 72 hrs (Last 3 readings):   Weight   03/16/18 0626 81 kg (178 lb 9.2 oz)   03/15/18 1000 81 kg (178 lb 9.2 oz)   03/13/18 1545 81 kg (178 lb 9.2 oz)     Body mass index is 27.97 kg/m².      Intake/Output Summary (Last 24 hours) at 03/16/18 1231  Last  data filed at 03/16/18 1200   Gross per 24 hour   Intake          7340.03 ml   Output             7697 ml   Net          -356.97 ml       Hemodynamic Parameters:       Telemetry: ST    Physical Exam   Constitutional: He is sedated and intubated.   Appears ill   HENT:   Head: Normocephalic and atraumatic.   Eyes: Conjunctivae and EOM are normal. Pupils are equal, round, and reactive to light.   Neck: Normal range of motion. No JVD present. No thyromegaly present.   RIJ trialysis in place. CDI.    Cardiovascular: Regular rhythm.    Tachycardic   Pulmonary/Chest: Effort normal. He is intubated. He has rales.   Intubated    Abdominal: Soft. Bowel sounds are normal.   + ascites   Musculoskeletal: Normal range of motion. He exhibits no edema.   Neurological: He is alert.   Skin: Skin is warm and dry. Capillary refill takes less than 2 seconds. There is pallor.        Psychiatric: He has a normal mood and affect. His behavior is normal. Judgment and thought content normal.       Significant Labs:  CBC:    Recent Labs  Lab 03/14/18  0425 03/15/18  0406 03/16/18  0328   WBC 5.78 6.87 5.16   RBC 2.58* 2.85* 2.66*   HGB 7.7* 8.5* 7.8*   HCT 23.9* 24.9* 24.7*    201 211   MCV 93 87 93   MCH 29.8 29.8 29.3   MCHC 32.2 34.1 31.6*     BNP:    Recent Labs  Lab 03/11/18  1922 03/13/18  0815   * 359*     CMP:    Recent Labs  Lab 03/14/18  0425  03/15/18  0406  03/15/18  1351  03/15/18  2207 03/16/18  0019 03/16/18  0328 03/16/18  0802 03/16/18  1045   *  < > 136*  136*  136*  < > 139*  < > 147* 146* 146*  146*  146* 146* 164*   CALCIUM 8.3*  < > 8.4*  8.4*  8.4*  < > 8.7  < > 8.9 9.3 9.0  9.0  9.0 9.5  --    ALBUMIN 2.8*  < > 2.5*  2.5*  --  2.7*  --  2.6*  --  2.4*  2.4*  --   --    PROT 6.4  --  5.9*  --   --   --   --   --  7.0  --   --    *  < > 142  142  142  < > 142  < > 143 143 142  142  142 143  --    K 6.2*  < > 4.2  4.2  4.2  4.2  < > 4.2  < > 5.4* 5.4*  5.4* 5.0  5.0  5.0   5.0 4.7  4.7  --    CO2 15*  < > 25  25  25  < > 26  < > 18* 21* 20*  20*  20* 21*  --      < > 104  104  104  < > 102  < > 111* 108 106  106  106 106  --    BUN 43*  < > 13  13  13  < > 6  < > 4* 4* 4*  4*  4* 5*  --    CREATININE 3.7*  < > 1.3  1.3  1.3  < > 1.1  < > 0.8 0.8 0.8  0.8  0.8 0.7  --    ALKPHOS 259*  --  209*  --   --   --   --   --  195*  --   --    ALT 9*  --  9*  --   --   --   --   --  13  --   --    AST 28  --  35  --   --   --   --   --  45*  --   --    BILITOT 1.4*  --  1.4*  --   --   --   --   --  1.7*  --   --    < > = values in this interval not displayed.   Coagulation:   No results for input(s): PT, INR, APTT in the last 168 hours.  LDH:  No results for input(s): LDH in the last 72 hours.  Microbiology:  Microbiology Results (last 7 days)     Procedure Component Value Units Date/Time    Culture, Respiratory with Gram Stain [[id number]] Collected:  03/14/18 1137    Order Status:  Completed Specimen:  Respiratory from BAL, TRIPP Updated:  03/16/18 1046     Respiratory Culture No growth     Gram Stain (Respiratory) No WBC's     Gram Stain (Respiratory) No organisms seen    Culture, Respiratory with Gram Stain [[id number]] Collected:  03/15/18 1541    Order Status:  Completed Specimen:  Respiratory from Sputum Updated:  03/16/18 0951     Respiratory Culture No Growth     Gram Stain (Respiratory) <10 epithelial cells per low power field.     Gram Stain (Respiratory) Rare WBC's      Gram Stain (Respiratory) No organisms seen    AFB Culture & Smear [[id number]] Collected:  03/14/18 1137    Order Status:  Completed Specimen:  Bronchial Wash from Amniotic Fluid Updated:  03/15/18 2127     AFB Culture & Smear Culture in progress     AFB CULTURE STAIN No acid fast bacilli seen.    Blood culture [[id number]] Collected:  03/13/18 1701    Order Status:  Completed Specimen:  Blood from Peripheral, Forearm, Left Updated:  03/15/18 2012     Blood Culture, Routine No Growth to date      Blood Culture, Routine No Growth to date     Blood Culture, Routine No Growth to date    Blood culture [[id number]] Collected:  03/13/18 1637    Order Status:  Completed Specimen:  Blood from Peripheral, Antecubital, Left Updated:  03/15/18 2012     Blood Culture, Routine No Growth to date     Blood Culture, Routine No Growth to date     Blood Culture, Routine No Growth to date    Respiratory Viral Panel by PCR [facility]; Nasal Swab [[id number]]  (Abnormal) Collected:  03/13/18 1733    Order Status:  Completed Specimen:  Respiratory Updated:  03/15/18 1302     Respiratory Virus Panel, source Nasal Swab     RVP - Adenovirus Not Detected     Comment: Detects Serotypes B and E. Detection of Serotype C may   be limited. If Adenovirus infection is suspected and a   Not Detected result is returned the sample should be   re-tested for Adenovirus using an independent method  (e.g. [employer] Adenovirus Quantitative Real-Time  PCR test.          Enterovirus Not Detected     Comment: Cross-reactivity has been observed between certain Rhinovirus  strains and the Enterovirus assay.          Human Bocavirus Not Detected     Human Coronavirus Not Detected     Comment: The Human Coronavirus assay detects Human coronavirus types  229E, OC43,NL63 and HKU1.          RVP - Human Metapneumovirus (hMPV) Not Detected     RVP - Influenza A Not Detected     Influenza A - S3W3-54 Not Detected     RVP - Influenza B Not Detected     Parainfluenza Not Detected     Respiratory Syncytial VirusVirus (RSV) A Positive (A)     Comment: The Respiratory Syncytial Viral assay detects types A and B,  however it does not distinguish between the two.          RVP - Rhinovirus Not Detected     Comment: Cross-Reactivity has been observed between certain   Rhinovirus strains and the Enterovirus assay.  Target Enriched Mulitplex Polymerase Chain Reaction (TEM-PCR)  allows for the detection of multiple pathogens out of a single  reaction.  This test was  developed and its performance   characteristics determined by [employer].  It has not   been cleared or approved by the U.[place].Food and Drug Administration.  Results should be used in conjunction with clinical findings,   and should not form the sole basis for a diagnosis or treatment  decision.  TEM-PCR is a licensed technology of [employer].         Narrative:       Receiving Lab:->[facility]    Culture, Respiratory with Gram Stain [[id number]] Collected:  03/13/18 1441    Order Status:  Completed Specimen:  Respiratory from Sputum, Expectorated Updated:  03/15/18 1237     Respiratory Culture Normal respiratory hank     Gram Stain (Respiratory) <10 epithelial cells per low power field.     Gram Stain (Respiratory) Rare WBC's     Gram Stain (Respiratory) Moderate Gram positive cocci     Gram Stain (Respiratory) Many Gram negative rods    Fungus culture [[id number]] Collected:  03/14/18 1137    Order Status:  Completed Specimen:  Bronchial Wash from Amniotic Fluid Updated:  03/15/18 1124     Fungus (Mycology) Culture Culture in progress    Stool culture **CANNOT BE ORDERED STAT** [[id number]] Collected:  03/11/18 1923    Order Status:  Completed Specimen:  Stool from Stool Updated:  03/14/18 1549     Stool Culture No enteric hank     Stool Culture No Salmonella,Shigella,Vibrio,Campylobacter,Yersinia isolated.    Culture, Fluid  (Aerobic) [[id number]] Collected:  03/14/18 1137    Order Status:  Canceled Specimen:  Bronchial Wash from Amniotic Fluid Updated:  03/14/18 1416    Cryptococcal antigen [[id number]] Collected:  03/14/18 0425    Order Status:  Completed Specimen:  Blood from Blood Updated:  03/14/18 1217     Cryptococcal Ag, Blood Negative    Culture, Respiratory with Gram Stain [[id number]] Collected:  03/13/18 1701    Order Status:  Completed Specimen:  Respiratory from Sputum, Expectorated Updated:  03/13/18 2224     Respiratory Culture Specimen inadequate - culture not performed     Gram  Stain (Respiratory) >10epis/lfp and <than many WBC's     Gram Stain (Respiratory) Predominance of oropharyngeal hank. Please recollect.    Clostridium difficile EIA [[id number]] Collected:  03/11/18 1923    Order Status:  Completed Specimen:  Stool from Stool Updated:  03/11/18 0787     C. diff Antigen Negative     C difficile Toxins A+B, EIA Negative     Comment: Testing not recommended for children <24 months old.       Stool culture [[id number]]     Order Status:  Completed Specimen:  Stool from Stool     Stool culture [[id number]]     Order Status:  Canceled Specimen:  Stool from Stool     E. coli 0157 antigen [[id number]] Collected:  03/11/18 1923    Order Status:  Canceled Specimen:  Stool from Stool Updated:  03/11/18 2001        I have reviewed all pertinent labs within the past 24 hours.    Estimated Creatinine Clearance: 137.3 mL/min (based on SCr of 0.7 mg/dL).    Diagnostic Results:  I have reviewed all pertinent imaging results/findings within the past 24 hours.

## 2018-03-16 NOTE — PROGRESS NOTES
[facility]  Pulm/Critical Care - Medicine  Progress Note    Patient Name: Lv Crocker  MRN: [id number]  Admission Date: 3/11/2018  Hospital Length of Stay: 4 days  Code Status: Full Code  Attending Provider: Jose A Lloyd MD  Primary Care Provider: Philippe Mohr MD   Principal Problem: Acute respiratory failure with hypoxia    Subjective:   Was seen and examined. Yest was dyssynchronous with the vent and sedation was changed from propofol to versed. This morning, he remains on the ventilator sedated. Did not have an SAT today as he was deeply sedated and primary team would like to avoid SAT today.    Review of Systems   Unable to obtain  Objective:     Vital Signs (Most Recent):  Temp: 96.9 °F (36.1 °C) (03/16/18 0700)  Pulse: (!) 120 (03/16/18 0907)  Resp: (!) 0 (03/16/18 0900)  BP: (!) 142/73 (03/15/18 0731)  SpO2: 98 % (03/16/18 0907) Vital Signs (24h Range):  Temp:  [96.9 °F (36.1 °C)-98.2 °F (36.8 °C)] 96.9 °F (36.1 °C)  Pulse:  [105-137] 120  Resp:  [0-22] 0  SpO2:  [93 %-100 %] 98 %  Arterial Line BP: ()/(41-77) 108/41     Weight: 81 kg (178 lb 9.2 oz)  Body mass index is 27.97 kg/m².      Intake/Output Summary (Last 24 hours) at 03/16/18 1041  Last data filed at 03/16/18 1000   Gross per 24 hour   Intake          7252.03 ml   Output             7475 ml   Net          -222.97 ml       Physical Exam   Constitutional: He is oriented to person, place, and time. He appears well-developed and well-nourished.   Sedated on mechanical ventilation   HENT:   Head: Normocephalic and atraumatic.   Neck: Normal range of motion. Neck supple. No tracheal deviation present. No thyromegaly present.   Cardiovascular:   tachycardic   Pulmonary/Chest:   On the vent    Abdominal: Soft.   Musculoskeletal: He exhibits no edema.   Neurological: He is alert and oriented to person, place, and time.   Skin: Skin is warm and dry.       Vents:  Vent Mode: A/C (03/16/18 0907)  Ventilator Initiated:  Yes (03/14/18 0932)  Set Rate: 24 bmp (03/16/18 0907)  Vt Set: 450 mL (03/16/18 0907)  Pressure Support: 0 cmH20 (03/16/18 0907)  PEEP/CPAP: 5 cmH20 (03/16/18 0907)  Oxygen Concentration (%): 40 (03/16/18 0907)  Peak Airway Pressure: 43 cmH2O (03/16/18 0907)  Plateau Pressure: 36 cmH20 (03/16/18 0907)  Total Ve: 11.5 mL (03/16/18 0907)  F/VT Ratio<105 (RSBI): (!) 0 (03/16/18 0530)    Lines/Drains/Airways     Central Venous Catheter Line                 Trialysis (Dialysis) Catheter 03/14/18 0813 right internal jugular 2 days          Drain                 NG/OG Tube 03/14/18 1300 [place] mouth 1 day         Urethral Catheter 03/14/18 1125 Latex 1 day          Airway                 Airway - Non-Surgical 03/14/18 0930 Endotracheal Tube 2 days          Arterial Line                 Arterial Line 03/14/18 1600 Right Femoral 1 day          Peripheral Intravenous Line                 Peripheral IV - Single Lumen 03/13/18 1600 Left Antecubital 2 days         Peripheral IV - Single Lumen 03/14/18 1736 Right Forearm 1 day                Significant Labs:    CBC/Anemia Profile:    Recent Labs  Lab 03/15/18  0406 03/16/18  0328   WBC 6.87 5.16   HGB 8.5* 7.8*   HCT 24.9* 24.7*    211   MCV 87 93   RDW 13.5 13.6        Chemistries:    Recent Labs  Lab 03/15/18  0406  03/15/18  1351  03/15/18  2207 03/16/18  0019 03/16/18  0328 03/16/18  0802     142  142  < > 142  < > 143 143 142  142  142 143   K 4.2  4.2  4.2  4.2  < > 4.2  < > 5.4* 5.4*  5.4* 5.0  5.0  5.0  5.0 4.7  4.7     104  104  < > 102  < > 111* 108 106  106  106 106   CO2 25  25  25  < > 26  < > 18* 21* 20*  20*  20* 21*   BUN 13  13  13  < > 6  < > 4* 4* 4*  4*  4* 5*   CREATININE 1.3  1.3  1.3  < > 1.1  < > 0.8 0.8 0.8  0.8  0.8 0.7   CALCIUM 8.4*  8.4*  8.4*  < > 8.7  < > 8.9 9.3 9.0  9.0  9.0 9.5   ALBUMIN 2.5*  2.5*  --  2.7*  --  2.6*  --  2.4*  2.4*  --    PROT 5.9*  --   --   --   --   --  7.0  --     BILITOT 1.4*  --   --   --   --   --  1.7*  --    ALKPHOS 209*  --   --   --   --   --  195*  --    ALT 9*  --   --   --   --   --  13  --    AST 35  --   --   --   --   --  45*  --    MG 2.2  < >  --   < >  --  1.8 2.2 2.0   PHOS 1.4*  --  2.0*  --  2.1*  --  2.7  --    < > = values in this interval not displayed.        Assessment/Plan:   1. ARDS vs. pulmonary edema  2. CACHORRO      42 yeear-old male with a PMH of a heart transplant who was admitted to the hospital on 3/11 for diarrhea and cough. He was intubated on 5/14 for hypoxemic and hypercapnic resp failure. His  Oxygenation has improved over the past 24 hours and CXR has improved compared to yest. Given the rapidity of radiological improvement, we favor some component of pulm edema. Peak pressures continue to remain high on the vent. Levophed dose has increased over the past day. Volume being removed via CRRT Sedated on versed. Viral PCR +ve for RSV. Plan:    -No SAT/SBT today as pt deeply sedated on versed. Discussed with primary team about avoiding versed as a sedative.  -LTV ventilation at 6 cc/Kg of IBW. Plateau pressures are about 35.  -Keep O>>I . Cont volume removal with CRRT.  -Abs per ID  -F/U culture data from bronch   -DVT prophylaxis    We will continue to follow with you. Pt meets criteria for SAT and SBT today. Please let us know when ready to start vent liberation trials.     Geri Stringer MD  Critical Care - Medicine  [facility]

## 2018-03-16 NOTE — ASSESSMENT & PLAN NOTE
43yo man w/a history of DM1, Hashimoto's thyroiditis, and ICM (s/p OHT 11/18/2014, CMV D+/R+, steroid induction, on maintenance tacro/MMF/pred; c/b early hemorrhagic tamponade requiring washout, delayed closure, and pericardial window and subsequent AF w/RVR, and CACHORRO; late course c/b ABMR 4/2015 s/p rituxan, PLEX, and IVIG; subsequent C.diff/CMV reactivation, restrictive cardiomyopathy with recurrent pleural effusions s/p VATS/pleurex catheter 1/2018 with removal 2/2018 and ascites requiring repeated LVP, and CKD) who was admitted on 3/11/2018 with ~3wks of worsening profuse non-bloody diarrhea, associated cramping abdominal pain, N/V, and a week of acute on chronic cough, SOB, hypoxia requiring intubation, and fevers and was found to have multifocal pneumonia on CT chest due to RSV-A pneumonia with suspected superimposed bacterial pneumonia. He remains critically ill with ongoing pressor requirement, hypoxic RF, CRRT requirement, and minimal hepatitis.    - will continue empiric vanc/zosyn/azithromycin, posaconazole, and IV GCV induction for broad coverage -- can begin to taper bacterial coverage if BAL cx are negative at 48-72h  - would continue oral ribavirin (while intubated avoiding inhaled) + IVIG + stress dose steroids per lung transplant protocol for severe RSV pneumonia  - await pending tests including: BAL cultures, urine legionella, Quantiferon, fungitell, Strongyloides ab (of note, O&P negative), adenovirus EIA, and [place] stool PCR panel   - flex sig for samples to exclude CMV invasive disease or other pathogens would be helpful but patient is not stable enough for this procedure currently; will empirically treat CMV for now and reassess later

## 2018-03-16 NOTE — PROGRESS NOTES
Nephrology came to patient's bedside and stated the patient's osullivan could be removed per [facility] decision. [facility] states they would like to keep osullivan for temporary monitoring while pt on dialysis. Will continue to monitor.

## 2018-03-16 NOTE — SUBJECTIVE & OBJECTIVE
"Interval HPI:   Overnight events: insulin gtt discontinued yesterday evening  Eating:   NPO  Nausea: No  Hypoglycemia and intervention: No  Fever: No  TPN and/or TF: Yes  If yes, type of TF/TPN and rate: 10cc/hr    BP (!) 142/73   Pulse (!) 120   Temp 96.9 °F (36.1 °C) (Axillary)   Resp (!) 0   Ht 5' 7" (1.702 m)   Wt 81 kg (178 lb 9.2 oz)   SpO2 98%   BMI 27.97 kg/m²     Labs Reviewed and Include      Recent Labs  Lab 03/16/18  0328 03/16/18  0802   *  146*  146* 146*   CALCIUM 9.0  9.0  9.0 9.5   ALBUMIN 2.4*  2.4*  --    PROT 7.0  --      142  142 143   K 5.0  5.0  5.0  5.0 4.7  4.7   CO2 20*  20*  20* 21*     106  106 106   BUN 4*  4*  4* 5*   CREATININE 0.8  0.8  0.8 0.7   ALKPHOS 195*  --    ALT 13  --    AST 45*  --    BILITOT 1.7*  --      Lab Results   Component Value Date    WBC 5.16 03/16/2018    HGB 7.8 (L) 03/16/2018    HCT 24.7 (L) 03/16/2018    MCV 93 03/16/2018     03/16/2018       Recent Labs  Lab 03/12/18  0443   TSH 0.101*   FREET4 1.33     Lab Results   Component Value Date    HGBA1C 6.9 (H) 02/11/2018       Nutritional status:   Body mass index is 27.97 kg/m².  Lab Results   Component Value Date    ALBUMIN 2.4 (L) 03/16/2018    ALBUMIN 2.4 (L) 03/16/2018    ALBUMIN 2.6 (L) 03/15/2018     Lab Results   Component Value Date    PREALBUMIN 5 (L) 03/15/2018    PREALBUMIN 18 (L) 03/24/2015    PREALBUMIN 19 (L) 12/17/2014       Estimated Creatinine Clearance: 137.3 mL/min (based on SCr of 0.7 mg/dL).    Accu-Checks  Recent Labs      03/15/18   1350  03/15/18   1438  03/15/18   1532  03/15/18   1644  03/15/18   1801  03/15/18   2017  03/15/18   2209  03/16/18   0018  03/16/18   0329  03/16/18   0829   POCTGLUCOSE  144*  132*  139*  144*  153*  172*  149*  182*  157*  145*       Current Medications and/or Treatments Impacting Glycemic Control  Immunotherapy:  Immunosuppressants     None        Steroids:   Hormones     Start     Stop Route Frequency " Ordered    03/15/18 1400  hydrocortisone sodium succinate injection 100 mg      -- IV Every 8 hours 03/15/18 1224        Pressors:    Autonomic Drugs     Start     Stop Route Frequency Ordered    03/14/18 1045  norepinephrine 16 mg in dextrose 5 % 250 mL infusion     Question Answer Comment   Titrate by: (in mcg/kg/min) 0.02    Titrate interval: (in minutes) 2    Titrate to maintain: (MAP or SBP) MAP    Greater than: (in mmHg) 60    Maximum dose: (in mcg/kg/min) 3        -- IV Continuous 03/14/18 0937        Hyperglycemia/Diabetes Medications: Antihyperglycemics     Start     Stop Route Frequency Ordered    03/14/18 1630  insulin regular (Humulin R) 100 Units in sodium chloride 0.9% 100 mL infusion     Question:  Insulin Rate Adjustment (DO NOT MODIFY ANSWER)  Answer:  [email or web address]    -- IV Continuous 03/14/18 1536

## 2018-03-16 NOTE — PROGRESS NOTES
Update:    SW to pt's room for update. Pt intubated and sedated. Pt's wife aaox4, calm, and appropriately tearful at times. Pt's wife reports coping adequately during the day when visitors keep her busy, however having difficulty coping in the evenings. SW providing emotional support. Pt's wife requesting permission to bring pt's dog for a visit. SW to assist with this process. Pt's wife reports no other needs at this time. SW providing ongoing psychosocial and counseling support, education, assistance, resources, and discharge planning as indicated. SW continuing to follow and remains available.

## 2018-03-16 NOTE — PROGRESS NOTES
Call per dialysis nurse to report hyperkalemia and decreasing bicarb. Ordered ABG which showed a pureed metabolic acidosis. Discuss case with primary team. Will increase bicarb bath to 30. Requested Lactic acid checked and SVO2.  Eulalio Mariee MD  Nephrology Fellow   [phone]

## 2018-03-16 NOTE — SUBJECTIVE & OBJECTIVE
Interval History: Remains intubated but with weaning vent settings. Still on pressors/CRRT. Afebrile now. Cultures unremarkable to date. RVP positive for RSV-A and started on oral ribavirin/IVIG last night.    Review of Systems   Unable to perform ROS: Intubated     Objective:     Vital Signs (Most Recent):  Temp: 97.2 °F (36.2 °C) (03/16/18 1100)  Pulse: (!) 119 (03/16/18 1321)  Resp: (!) 0 (03/16/18 0900)  BP: (!) 142/73 (03/15/18 0731)  SpO2: 100 % (03/16/18 1321) Vital Signs (24h Range):  Temp:  [96.9 °F (36.1 °C)-98.2 °F (36.8 °C)] 97.2 °F (36.2 °C)  Pulse:  [105-126] 119  Resp:  [0-22] 0  SpO2:  [96 %-100 %] 100 %  Arterial Line BP: (100-150)/(41-64) 108/41     Weight: 81 kg (178 lb 9.2 oz)  Body mass index is 27.97 kg/m².    Estimated Creatinine Clearance: 137.3 mL/min (based on SCr of 0.7 mg/dL).    Physical Exam   Constitutional: He appears well-developed and well-nourished. No distress.   Chronically ill appearing. Intubated.   HENT:   Head: Atraumatic.   Mouth/Throat: Oropharynx is clear and moist. No oropharyngeal exudate.   Eyes: Conjunctivae are normal. Pupils are equal, round, and reactive to light. No scleral icterus.   Neck: Neck supple.   Cardiovascular: Normal rate and regular rhythm.  Exam reveals no friction rub.    No murmur heard.  Pulmonary/Chest: No respiratory distress. He has no wheezes. He has rales. He exhibits no tenderness.   Intubated.   Abdominal: Soft. Bowel sounds are normal. He exhibits no distension. There is no tenderness. There is no rebound and no guarding.   Musculoskeletal: Normal range of motion. He exhibits no edema.   Lymphadenopathy:     He has no cervical adenopathy.   Neurological:   Intubated/sedated.   Skin: No rash noted. No erythema.       Significant Labs:   CBC:     Recent Labs  Lab 03/15/18  0406 03/16/18  0328   WBC 6.87 5.16   HGB 8.5* 7.8*   HCT 24.9* 24.7*    211     CMP:   Recent Labs  Lab 03/15/18  0406  03/15/18  2207  03/16/18  0328 03/16/18  0802  03/16/18  1045 03/16/18  1148 03/16/18  1352     142  142  < > 143  < > 142  142  142 143  --  143 143  143   K 4.2  4.2  4.2  4.2  < > 5.4*  < > 5.0  5.0  5.0  5.0 4.7  4.7  --  4.5  4.5 4.4  4.4     104  104  < > 111*  < > 106  106  106 106  --  105 106  106   CO2 25  25  25  < > 18*  < > 20*  20*  20* 21*  --  21* 20*  20*   *  136*  136*  < > 147*  < > 146*  146*  146* 146* 164* 160* 167*  167*   BUN 13  13  13  < > 4*  < > 4*  4*  4* 5*  --  5* 5*  5*   CREATININE 1.3  1.3  1.3  < > 0.8  < > 0.8  0.8  0.8 0.7  --  0.7 0.7  0.7   CALCIUM 8.4*  8.4*  8.4*  < > 8.9  < > 9.0  9.0  9.0 9.5  --  9.5 9.5  9.5   PROT 5.9*  --   --   --  7.0  --   --   --   --    ALBUMIN 2.5*  2.5*  < > 2.6*  --  2.4*  2.4*  --   --   --  2.4*  2.4*   BILITOT 1.4*  --   --   --  1.7*  --   --   --   --    ALKPHOS 209*  --   --   --  195*  --   --   --   --    AST 35  --   --   --  45*  --   --   --   --    ALT 9*  --   --   --  13  --   --   --   --    ANIONGAP 13  13  13  < > 14  < > 16  16  16 16  --  17* 17*  17*   EGFRNONAA >60.0  >60.0  >60.0  < > >60.0  < > >60.0  >60.0  >60.0 >60.0  --  >60.0 >60.0  >60.0   < > = values in this interval not displayed.    Significant Imaging: I have reviewed all pertinent imaging results/findings within the past 24 hours.     CT chest:  Status post heart transplantation with new multifocal subsegmental consolidation throughout both lungs.  Similar findings were present on the CT dated 12/12/2017 with partial improvement on 02/14/2018 therefore we doubt malignancy.  Differential considerations include multifocal infectious pneumonia, hemorrhage, or aspiration.  We consider pulmonary edema due to abnormal capillary permeability or cardiac decompensation less likely.  Lymphoma could present similarly although felt less likely due to improvement on CT of 2/14/2018.    Continued moderate sized incompletely dependent right  pleural effusion with rounded consolidation in the right lower lobe which probably represents rounded atelectasis, similar to prior, new from 7/19/2016.    Redemonstration of mild scattered ascites in the upper abdomen.     Microbiology:  3/11 stool WBC negative  3/11 stool cx: negative  3/11 O&P negative  3/11 C.diff negative  3/11 Giardia antigen negative  3/11 Cryptosporidium antigen negative  3/11 rotavirus negative  3/11 norovirus negative  3/12 CMV quant negative  3/13 blood cx: NGTD  3/13 sputum cx: NGTD  3/13 aspergillus antigen negative  3/13 fungitell pending  3/13 urine histo negative  3/13 urine blasto negative  3/13 crypto antigen negative  3/13 urine legionella pending  3/13 RVP + for RSV A  3/13 adenovirus stool EIA pending  3/13 Strongyloides ab pending  3/13 [facility] stool pathogens panel pending  3/14 quantiferon pending

## 2018-03-16 NOTE — PROGRESS NOTES
"[facility]  Endocrinology  Progress Note    Admit Date: 3/11/2018     Reason for Consult: abnormal TFTs    Surgical Procedure and Date: s/p heart transplant 2014     Diabetes diagnosis year: 5yo (T1DM)     Home Diabetes Medications:    Levemir 15u qHS  Novolog 4u AC     How often checking glucose at home? 4 times daily   BG readings on regimen: 150-200s  Hypoglycemia on the regimen?  Yes, rarely - treats appropriately (light snack, glucose tab)  Missed doses on regimen?  No        Diabetes Complications include:     Hyperglycemia, Hypoglycemia  and Diabetic chronic kidney disease          Complicating diabetes co morbidities:   N/A     HPI:   Patient is a 42 y.o. male with a diagnosis of T1DM, HTN, hypothyroidism, s/p heart transplant.  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 He required serial paracentesis and thoracentesis until he underwent a VATS with pleurX catheter placement on 1/11/2018 and removed 2/7/18.       Presented to hospital secondary to diarrhea and cough.  Upon initial labs, TSH was decreased (0.101) and free T4 1.33.  Endocrinology consulted to assist in management of these labs.  He was last seen in clinic by Kamala Vázquez NP 11/2017.   Previous hospitalization, endocrine consulted for same problem.  During that visit, recommended decreasing LT4 to 125mcg daily. Unfortunately, patient was discharged on previous dose of 150mcg daily.     Interval HPI:   Overnight events: insulin gtt discontinued yesterday evening  Eating:   NPO  Nausea: No  Hypoglycemia and intervention: No  Fever: No  TPN and/or TF: Yes  If yes, type of TF/TPN and rate: 10cc/hr    BP (!) 142/73   Pulse (!) 120   Temp 96.9 °F (36.1 °C) (Axillary)   Resp (!) 0   Ht 5' 7" (1.702 m)   Wt 81 kg (178 lb 9.2 oz)   SpO2 98%   BMI 27.97 kg/m²       Labs Reviewed and Include      Recent Labs  Lab " 03/16/18  0328 03/16/18  0802   *  146*  146* 146*   CALCIUM 9.0  9.0  9.0 9.5   ALBUMIN 2.4*  2.4*  --    PROT 7.0  --      142  142 143   K 5.0  5.0  5.0  5.0 4.7  4.7   CO2 20*  20*  20* 21*     106  106 106   BUN 4*  4*  4* 5*   CREATININE 0.8  0.8  0.8 0.7   ALKPHOS 195*  --    ALT 13  --    AST 45*  --    BILITOT 1.7*  --      Lab Results   Component Value Date    WBC 5.16 03/16/2018    HGB 7.8 (L) 03/16/2018    HCT 24.7 (L) 03/16/2018    MCV 93 03/16/2018     03/16/2018       Recent Labs  Lab 03/12/18  0443   TSH 0.101*   FREET4 1.33     Lab Results   Component Value Date    HGBA1C 6.9 (H) 02/11/2018       Nutritional status:   Body mass index is 27.97 kg/m².  Lab Results   Component Value Date    ALBUMIN 2.4 (L) 03/16/2018    ALBUMIN 2.4 (L) 03/16/2018    ALBUMIN 2.6 (L) 03/15/2018     Lab Results   Component Value Date    PREALBUMIN 5 (L) 03/15/2018    PREALBUMIN 18 (L) 03/24/2015    PREALBUMIN 19 (L) 12/17/2014       Estimated Creatinine Clearance: 137.3 mL/min (based on SCr of 0.7 mg/dL).    Accu-Checks  Recent Labs      03/15/18   1350  03/15/18   1438  03/15/18   1532  03/15/18   1644  03/15/18   1801  03/15/18   2017  03/15/18   2209  03/16/18   0018  03/16/18   0329  03/16/18   0829   POCTGLUCOSE  144*  132*  139*  144*  153*  172*  149*  182*  157*  145*       Current Medications and/or Treatments Impacting Glycemic Control  Immunotherapy:  Immunosuppressants     None        Steroids:   Hormones     Start     Stop Route Frequency Ordered    03/15/18 1400  hydrocortisone sodium succinate injection 100 mg      -- IV Every 8 hours 03/15/18 1224        Pressors:    Autonomic Drugs     Start     Stop Route Frequency Ordered    03/14/18 1045  norepinephrine 16 mg in dextrose 5 % 250 mL infusion     Question Answer Comment   Titrate by: (in mcg/kg/min) 0.02    Titrate interval: (in minutes) 2    Titrate to maintain: (MAP or SBP) MAP    Greater than: (in mmHg) 60     Maximum dose: (in mcg/kg/min) 3        -- IV Continuous 03/14/18 0937        Hyperglycemia/Diabetes Medications: Antihyperglycemics     Start     Stop Route Frequency Ordered    03/14/18 1630  insulin regular (Humulin R) 100 Units in sodium chloride 0.9% 100 mL infusion     Question:  Insulin Rate Adjustment (DO NOT MODIFY ANSWER)  Answer:  [email or web address]    -- IV Continuous 03/14/18 1530          ASSESSMENT and PLAN    Type 1 diabetes mellitus with stage 3 chronic kidney disease      BG goal 140-180  Lab Results   Component Value Date    HGBA1C 6.9 (H) 02/11/2018     continue intensive insulin gtt at 0.5u/hr, insulin gtt cannot be discontinued as he is a type 1 diabetic  bg monitoring q1hr  Recheck c-peptide, glucose, anti-insulin Ab     Discharge Recommendations:  TBD.        Hypothyroidism due to Hashimoto's thyroiditis    Abnormal TFTs upon admit.  Unclear if secondary to illness, but has been seen before in the past (during previous hospitalization).    Continue LT4 ng 125mcg daily.  Repeat TFTs in 4 weeks.          Heart transplanted    Per transplant  Avoid hypoglycemia              Ankur Keith MD  Endocrinology  [facility]

## 2018-03-16 NOTE — ASSESSMENT & PLAN NOTE
BG goal 140-180  Lab Results   Component Value Date    HGBA1C 6.9 (H) 02/11/2018     continue intensive insulin gtt at 0.5u/hr, insulin gtt cannot be discontinued as he is a type 1 diabetic  bg monitoring q1hr  Recheck c-peptide, glucose, anti-insulin Ab     Discharge Recommendations:  KAILEY[facility].

## 2018-03-16 NOTE — ASSESSMENT & PLAN NOTE
-S/P Bronch 3/14. Awaiting cultures.   -Appreciate PUL/ID Cx. RCV positive. Started Ribavarin/IVIG.  -Continue empiric vanc/zosyn/azithromycin for broad bacterial coverage and posaconazole for nodular appearing lesions in lungs   -Started induction GCV until invasive CMV is excluded.  -Aspergillus antigen, fungitell, urine histo/blasto antigens, crypto antigen, RVP, urine legionella, and Quantiferon pending.

## 2018-03-16 NOTE — NURSING
Post Heart Transplant Coordinator Note    Visited with pt's wife in pt's ICU room. Pt's wife, Elizabeth depressed and weary about his prognosis. She was able to get sleep last night and has family keeping her company. She stated Dr. Lloyd will give him a week to improve and then, at that time, if needed will talk about goals of care. Pt's wife stated she didn't want him to suffer anymore, but he voiced his desire to live before being intubated. Provided emotional support to pt's wife. Made her aware that I am available for anything at anytime.

## 2018-03-16 NOTE — ASSESSMENT & PLAN NOTE
-Continue Levo for MAP 60.  -Added hydrocortisone 100mg TID.  -IV Abx as above.   -Tube feeds as tolerated.

## 2018-03-16 NOTE — ASSESSMENT & PLAN NOTE
- TTE 3/12/18 showed LVEF 50-55% (but no substantial change when compared to previous echo), RV normal and IVC 8  - Had -ve DSE on 11/30/17  - Current immunosuppression: Pred/Prograf/MMF on hold. Continue hydrocortisone 100mg Q8H.

## 2018-03-16 NOTE — PROGRESS NOTES
[facility]  Infectious Disease  Progress Note    Patient Name: Lv Crocker  MRN: [id number]  Admission Date: 3/11/2018  Length of Stay: 4 days  Attending Physician: Jose A Lloyd MD  Primary Care Provider: Philippe Mohr MD    Isolation Status: Contact  Assessment/Plan:      * Acute respiratory failure with hypoxia    43yo man w/a history of DM1, Hashimoto's thyroiditis, and ICM (s/p OHT 11/18/2014, CMV D+/R+, steroid induction, on maintenance tacro/MMF/pred; c/b early hemorrhagic tamponade requiring washout, delayed closure, and pericardial window and subsequent AF w/RVR, and CACHORRO; late course c/b ABMR 4/2015 s/p rituxan, PLEX, and IVIG; subsequent C.diff/CMV reactivation, restrictive cardiomyopathy with recurrent pleural effusions s/p VATS/pleurex catheter 1/2018 with removal 2/2018 and ascites requiring repeated LVP, and CKD) who was admitted on 3/11/2018 with ~3wks of worsening profuse non-bloody diarrhea, associated cramping abdominal pain, N/V, and a week of acute on chronic cough, SOB, hypoxia requiring intubation, and fevers and was found to have multifocal pneumonia on CT chest due to RSV-A pneumonia with suspected superimposed bacterial pneumonia. He remains critically ill with ongoing pressor requirement, hypoxic RF, CRRT requirement, and minimal hepatitis.    - will continue empiric vanc/zosyn/azithromycin, posaconazole, and IV GCV induction for broad coverage -- can begin to taper bacterial coverage if BAL cx are negative at 48-72h  - would continue oral ribavirin (while intubated avoiding inhaled) + IVIG + stress dose steroids per lung transplant protocol for severe RSV pneumonia  - await pending tests including: BAL cultures, urine legionella, Quantiferon, fungitell, Strongyloides ab (of note, O&P negative), adenovirus EIA, and [place] stool PCR panel   - flex sig for samples to exclude CMV invasive disease or other pathogens would be helpful but patient is not stable  enough for this procedure currently; will empirically treat CMV for now and reassess later            Anticipated Disposition: pending improvement    Thank you for your consult. I will follow-up with patient. Please contact us if you have any additional questions.     Maribell Eric MD  Transplant ID Attending  [id number]    Maribell Eric MD  Infectious Disease  [facility]    Subjective:     Principal Problem:Acute respiratory failure with hypoxia    HPI: No notes on file  Interval History: Remains intubated but with weaning vent settings. Still on pressors/CRRT. Afebrile now. Cultures unremarkable to date. RVP positive for RSV-A and started on oral ribavirin/IVIG last night.    Review of Systems   Unable to perform ROS: Intubated     Objective:     Vital Signs (Most Recent):  Temp: 97.2 °F (36.2 °C) (03/16/18 1100)  Pulse: (!) 119 (03/16/18 1321)  Resp: (!) 0 (03/16/18 0900)  BP: (!) 142/73 (03/15/18 0731)  SpO2: 100 % (03/16/18 1321) Vital Signs (24h Range):  Temp:  [96.9 °F (36.1 °C)-98.2 °F (36.8 °C)] 97.2 °F (36.2 °C)  Pulse:  [105-126] 119  Resp:  [0-22] 0  SpO2:  [96 %-100 %] 100 %  Arterial Line BP: (100-150)/(41-64) 108/41     Weight: 81 kg (178 lb 9.2 oz)  Body mass index is 27.97 kg/m².    Estimated Creatinine Clearance: 137.3 mL/min (based on SCr of 0.7 mg/dL).    Physical Exam   Constitutional: He appears well-developed and well-nourished. No distress.   Chronically ill appearing. Intubated.   HENT:   Head: Atraumatic.   Mouth/Throat: Oropharynx is clear and moist. No oropharyngeal exudate.   Eyes: Conjunctivae are normal. Pupils are equal, round, and reactive to light. No scleral icterus.   Neck: Neck supple.   Cardiovascular: Normal rate and regular rhythm.  Exam reveals no friction rub.    No murmur heard.  Pulmonary/Chest: No respiratory distress. He has no wheezes. He has rales. He exhibits no tenderness.   Intubated.   Abdominal: Soft. Bowel sounds are normal. He exhibits no  distension. There is no tenderness. There is no rebound and no guarding.   Musculoskeletal: Normal range of motion. He exhibits no edema.   Lymphadenopathy:     He has no cervical adenopathy.   Neurological:   Intubated/sedated.   Skin: No rash noted. No erythema.       Significant Labs:   CBC:     Recent Labs  Lab 03/15/18  0406 03/16/18  0328   WBC 6.87 5.16   HGB 8.5* 7.8*   HCT 24.9* 24.7*    211     CMP:   Recent Labs  Lab 03/15/18  0406  03/15/18  2207  03/16/18  0328 03/16/18  0802 03/16/18  1045 03/16/18  1148 03/16/18  1352     142  142  < > 143  < > 142  142  142 143  --  143 143  143   K 4.2  4.2  4.2  4.2  < > 5.4*  < > 5.0  5.0  5.0  5.0 4.7  4.7  --  4.5  4.5 4.4  4.4     104  104  < > 111*  < > 106  106  106 106  --  105 106  106   CO2 25  25  25  < > 18*  < > 20*  20*  20* 21*  --  21* 20*  20*   *  136*  136*  < > 147*  < > 146*  146*  146* 146* 164* 160* 167*  167*   BUN 13  13  13  < > 4*  < > 4*  4*  4* 5*  --  5* 5*  5*   CREATININE 1.3  1.3  1.3  < > 0.8  < > 0.8  0.8  0.8 0.7  --  0.7 0.7  0.7   CALCIUM 8.4*  8.4*  8.4*  < > 8.9  < > 9.0  9.0  9.0 9.5  --  9.5 9.5  9.5   PROT 5.9*  --   --   --  7.0  --   --   --   --    ALBUMIN 2.5*  2.5*  < > 2.6*  --  2.4*  2.4*  --   --   --  2.4*  2.4*   BILITOT 1.4*  --   --   --  1.7*  --   --   --   --    ALKPHOS 209*  --   --   --  195*  --   --   --   --    AST 35  --   --   --  45*  --   --   --   --    ALT 9*  --   --   --  13  --   --   --   --    ANIONGAP 13  13  13  < > 14  < > 16  16  16 16  --  17* 17*  17*   EGFRNONAA >60.0  >60.0  >60.0  < > >60.0  < > >60.0  >60.0  >60.0 >60.0  --  >60.0 >60.0  >60.0   < > = values in this interval not displayed.    Significant Imaging: I have reviewed all pertinent imaging results/findings within the past 24 hours.     CT chest:  Status post heart transplantation with new multifocal subsegmental consolidation throughout both  lungs.  Similar findings were present on the CT dated 12/12/2017 with partial improvement on 02/14/2018 therefore we doubt malignancy.  Differential considerations include multifocal infectious pneumonia, hemorrhage, or aspiration.  We consider pulmonary edema due to abnormal capillary permeability or cardiac decompensation less likely.  Lymphoma could present similarly although felt less likely due to improvement on CT of 2/14/2018.    Continued moderate sized incompletely dependent right pleural effusion with rounded consolidation in the right lower lobe which probably represents rounded atelectasis, similar to prior, new from 7/19/2016.    Redemonstration of mild scattered ascites in the upper abdomen.     Microbiology:  3/11 stool WBC negative  3/11 stool cx: negative  3/11 O&P negative  3/11 C.diff negative  3/11 Giardia antigen negative  3/11 Cryptosporidium antigen negative  3/11 rotavirus negative  3/11 norovirus negative  3/12 CMV quant negative  3/13 blood cx: NGTD  3/13 sputum cx: NGTD  3/13 aspergillus antigen negative  3/13 fungitell pending  3/13 urine histo negative  3/13 urine blasto negative  3/13 crypto antigen negative  3/13 urine legionella pending  3/13 RVP + for RSV A  3/13 adenovirus stool EIA pending  3/13 Strongyloides ab pending  3/13 [place] stool pathogens panel pending  3/14 quantiferon pending

## 2018-03-16 NOTE — PROGRESS NOTES
[facility]  Nephrology  Progress Note    Patient Name: Lv Crocker  MRN: [id number]  Admission Date: 3/11/2018  Hospital Length of Stay: 4 days  Attending Provider: Jose A Lloyd MD   Primary Care Physician: Philippe Mohr MD  Principal Problem:Acute respiratory failure with hypoxia    Subjective:     HPI: Mr. Crocker is a 41 yo WM with T1DM, Hashimoto thyroiditis, h/o OHTx 11/2014, and CKD stage 4 who was admitted on 3/11/18 for persistent diarrhea. He reported a 3 week history of diarrhea up to 15x/day. Associated symptoms including URI symptoms, coughing fits, and coughing syncope. Prograf level was 14.3. His post-heart transplant course has been complicated by AMR 4/2015, CMV, post-transplant restrictive cardiomyopathy, recurrent pleural effusions/ascites requiring monthly thoracenteses/paracenteses, VATS 1/11/18 with Pleur-X catheter (now removed), and CKD stage 4 with baseline sCr 2.6-3.0. Baseline -120s and baseline BP 90s-110s/60-70s. Upon admission he was started on IVF and diarrhea workup was initiated. On 3/12 he was noted to have decreased UOP despite fluids; NS was increased to 100cc/hr. He was scheduled for a colonoscopy on 3/13 however procedure was cancelled due to hypoxia and fever. He was transferred to the ICU for closer monitoring. He continued to have oliguria overnight. Bladder scan revealed 200cc of urine but patient refused osullivan catheter placement. Wife reports that patient always has a hard time urinating again after osullivan catheters are placed/removed so he refuses them. He remained hypoxic despite FiO2 70% and ABG revealed combined respiratory and metabolic acidosis with pH 7.17. He was started on BiPAP as well as a bicarb infusion at 50cc/hr. ABG with mild improvement. AM labs revealed K 6.2 and he was shifted and given kayexalate.Trialysis catheter was placed this morning and he subsequently developed hypotension and was started on levophed. He was  intubated later this morning. Nephrology consulted for CACHORRO. All history obtained by primary team and chart review as patient was intubated/sedated on exam. Consent for dialysis obtained by patient's wife and placed in chart. Of note, both of patient's parents were on dialysis.     Interval History:   Dialysate baths changed overnight to 3K/30 bicarb. Otherwise no events. UOP 30cc yesterday; net negative 300cc/24h. Minimal vent settings. Hourly intake 50cc/hr with versed, fentanyl, levophed, and tube feeds.     Review of patient's allergies indicates:   Allergen Reactions    No known drug allergies      Current Facility-Administered Medications   Medication Frequency    0.9%  NaCl infusion (CRRT USE ONLY) Continuous    acetaminophen tablet 325 mg Q4H PRN    azithromycin (ZITHROMAX) 500 mg in sodium chloride 0.9% 250 mL IVPB Q24H    chlorhexidine 0.12 % solution 15 mL BID    dexmedetomidine (PRECEDEX) 400mcg/100mL 0.9% NaCL infusion Continuous    dextrose 50% injection 12.5 g PRN    dextrose 50% injection 25 g PRN    escitalopram oxalate tablet 20 mg Daily    famotidine (PF) injection 20 mg Daily    fentaNYL 2500 mcg in 0.9% sodium chloride 250 mL infusion premix (titrating) Continuous    ganciclovir (CYTOVENE) 200 mg in sodium chloride 0.9% 100 mL IVPB Q24H    glucagon (human recombinant) injection 1 mg PRN    glucose chewable tablet 16 g PRN    glucose chewable tablet 24 g PRN    heparin (porcine) injection 5,000 Units Q8H    hydrocortisone sodium succinate injection 100 mg Q8H    insulin regular (Humulin R) 100 Units in sodium chloride 0.9% 100 mL infusion Continuous    k phos di & mono-sod phos mono 250 mg tablet 2 tablet Q4H PRN    levothyroxine tablet 125 mcg Before breakfast    midazolam (VERSED) 1 mg/mL in sodium chloride 0.9% 100 mL infusion (titrating) Continuous    norepinephrine 16 mg in dextrose 5 % 250 mL infusion Continuous    piperacillin-tazobactam 4.5 g in sodium chloride 0.9%  100 mL IVPB (ready to mix system) Q8H    posaconazole 300 mg in dextrose 5 % 150 mL infusion Daily    ribavirin capsule 600 mg BID    sodium chloride 0.9% flush 3 mL Q8H       Objective:     Vital Signs (Most Recent):  Temp: 97.2 °F (36.2 °C) (03/16/18 1100)  Pulse: (!) 119 (03/16/18 1321)  Resp: (!) 0 (03/16/18 0900)  BP: (!) 142/73 (03/15/18 0731)  SpO2: 100 % (03/16/18 1321)  O2 Device (Oxygen Therapy): ventilator (03/16/18 1321) Vital Signs (24h Range):  Temp:  [96.9 °F (36.1 °C)-98.2 °F (36.8 °C)] 97.2 °F (36.2 °C)  Pulse:  [105-135] 119  Resp:  [0-22] 0  SpO2:  [96 %-100 %] 100 %  Arterial Line BP: (100-150)/(41-77) 108/41     Weight: 81 kg (178 lb 9.2 oz) (03/16/18 0626)  Body mass index is 27.97 kg/m².  Body surface area is 1.96 meters squared.    I/O last 3 completed shifts:  In: 7933.5 [I.V.:5833.5; NG/GT:450; IV Piggyback:1650]  Out: 77077 [Urine:190; Other:88246]    Physical Exam   Constitutional: He appears well-developed and well-nourished. He is sedated and intubated.   HENT:   Head: Normocephalic and atraumatic.   Neck: Neck supple. No thyromegaly present.   Cardiovascular: Normal rate and regular rhythm.    Pulmonary/Chest: He is intubated. He has no wheezes. He has no rales.   Abdominal: Soft. He exhibits no distension.   Musculoskeletal: He exhibits no deformity. Edema: no pitting edema.   Skin: Skin is warm and dry. He is not diaphoretic.       Significant Labs:  CBC:   Recent Labs  Lab 03/16/18  0328   WBC 5.16   RBC 2.66*   HGB 7.8*   HCT 24.7*      MCV 93   MCH 29.3   MCHC 31.6*     CMP:   Recent Labs  Lab 03/16/18  0328  03/16/18  1148   *  146*  146*  < > 160*   CALCIUM 9.0  9.0  9.0  < > 9.5   ALBUMIN 2.4*  2.4*  --   --    PROT 7.0  --   --      142  142  < > 143   K 5.0  5.0  5.0  5.0  < > 4.5  4.5   CO2 20*  20*  20*  < > 21*     106  106  < > 105   BUN 4*  4*  4*  < > 5*   CREATININE 0.8  0.8  0.8  < > 0.7   ALKPHOS 195*  --   --    ALT 13   --   --    AST 45*  --   --    BILITOT 1.7*  --   --    < > = values in this interval not displayed.  All labs within the past 24 hours have been reviewed.     Significant Imaging:  Labs: Reviewed  X-Ray: Reviewed; improved    Assessment/Plan:     Acute renal failure superimposed on stage 4 chronic kidney disease    - baseline sCr 2.6-3.0 consistent with CKD stage IV  - anuric CACHORRO; likely ischemic ATN from prolonged pre-renal state (diarrhea) in setting of prograf renal vasoconstriction; cannot r/o septic ATN or Prograf toxicity  - SLED started 3/14  - net negative only 300cc yesterday (600cc/48 hours)  - UOP only 30cc/24h. Can d/c osullivan catheter if daily bladder scans are performed  - continue SLED today for metabolic clearance and volume management; UF goal 250-350cc/hr. 3K/35bicarb bath.             Amairani Alegria, PGY-5  Nephrology Fellow  [facility]  Pager: [phone]    Patient seen and examined with Dr Alegria;   I have reviewed and agree with assessment and plan

## 2018-03-16 NOTE — PROGRESS NOTES
Medication update:    Pulmonology team concerned with Versed drip since it could take the patient longer to wake up. Pulmonology communicated with [facility] about their concern and agreed to replace Versed drip with Precedex. Tim Mao, [facility] NP, ordered Precedex to start once current Versed bag is complete.

## 2018-03-16 NOTE — PROGRESS NOTES
[facility]  Heart Transplant  Progress Note    Patient Name: Lv Crocker  MRN: [id number]  Admission Date: 3/11/2018  Hospital Length of Stay: 4 days  Attending Physician: Jose A Lloyd MD  Primary Care Provider: Philippe Mohr MD  Principal Problem:Acute respiratory failure with hypoxia    Subjective:     Interval History: Intubated/sedated.     Continuous Infusions:   sodium chloride 0.9% 200 mL/hr at 03/16/18 1158    fentanyl 10 mL/hr at 03/16/18 1200    insulin (HUMAN R) infusion (adults) 0.5 Units/hr (03/16/18 1213)    midazolam (VERSED) infusion (titrating) 10 mg/hr (03/16/18 1200)    norepinephrine bitartrate-D5W 0.25 mcg/kg/min (03/16/18 1200)     Scheduled Meds:   azithromycin (ZITHROMAX) 500 mg IVPB  500 mg Intravenous Q24H    chlorhexidine  15 mL Mouth/Throat BID    escitalopram oxalate  20 mg Per NG tube Daily    famotidine (PF)  20 mg Intravenous Daily    ganciclovir (CYTOVENE) IVPB  200 mg Intravenous Q24H    heparin (porcine)  5,000 Units Subcutaneous Q8H    hydrocortisone sodium succinate  100 mg Intravenous Q8H    levothyroxine  125 mcg Per NG tube Before breakfast    piperacillin-tazobactam (ZOSYN) IVPB  4.5 g Intravenous Q8H    posaconazole (NOXAFIL) 300 mg in dextrose 5% IVPB  300 mg Intravenous Daily    ribavirin  600 mg Oral BID    sodium chloride 0.9%  3 mL Intravenous Q8H     PRN Meds:acetaminophen, dextrose 50%, dextrose 50%, glucagon (human recombinant), glucose, glucose, k phos di & mono-sod phos mono    Review of patient's allergies indicates:   Allergen Reactions    No known drug allergies      Objective:     Vital Signs (Most Recent):  Temp: 97.2 °F (36.2 °C) (03/16/18 1100)  Pulse: (!) 117 (03/16/18 1149)  Resp: (!) 0 (03/16/18 0900)  BP: (!) 142/73 (03/15/18 0731)  SpO2: 100 % (03/16/18 1149) Vital Signs (24h Range):  Temp:  [96.9 °F (36.1 °C)-98.2 °F (36.8 °C)] 97.2 °F (36.2 °C)  Pulse:  [105-135] 117  Resp:  [0-22] 0  SpO2:  [95  %-100 %] 100 %  Arterial Line BP: (100-150)/(41-77) 108/41     Patient Vitals for the past 72 hrs (Last 3 readings):   Weight   03/16/18 0626 81 kg (178 lb 9.2 oz)   03/15/18 1000 81 kg (178 lb 9.2 oz)   03/13/18 1545 81 kg (178 lb 9.2 oz)     Body mass index is 27.97 kg/m².      Intake/Output Summary (Last 24 hours) at 03/16/18 1231  Last data filed at 03/16/18 1200   Gross per 24 hour   Intake          7340.03 ml   Output             7697 ml   Net          -356.97 ml       Hemodynamic Parameters:       Telemetry: ST    Physical Exam   Constitutional: He is sedated and intubated.   Appears ill   HENT:   Head: Normocephalic and atraumatic.   Eyes: Conjunctivae and EOM are normal. Pupils are equal, round, and reactive to light.   Neck: Normal range of motion. No JVD present. No thyromegaly present.   RIJ trialysis in place. CDI.    Cardiovascular: Regular rhythm.    Tachycardic   Pulmonary/Chest: Effort normal. He is intubated. He has rales.   Intubated    Abdominal: Soft. Bowel sounds are normal.   + ascites   Musculoskeletal: Normal range of motion. He exhibits no edema.   Neurological: He is alert.   Skin: Skin is warm and dry. Capillary refill takes less than 2 seconds. There is pallor.        Psychiatric: He has a normal mood and affect. His behavior is normal. Judgment and thought content normal.       Significant Labs:  CBC:    Recent Labs  Lab 03/14/18  0425 03/15/18  0406 03/16/18  0328   WBC 5.78 6.87 5.16   RBC 2.58* 2.85* 2.66*   HGB 7.7* 8.5* 7.8*   HCT 23.9* 24.9* 24.7*    201 211   MCV 93 87 93   MCH 29.8 29.8 29.3   MCHC 32.2 34.1 31.6*     BNP:    Recent Labs  Lab 03/11/18  1922 03/13/18  0815   * 359*     CMP:    Recent Labs  Lab 03/14/18  0425  03/15/18  0406  03/15/18  1351  03/15/18  2207 03/16/18  0019 03/16/18  0328 03/16/18  0802 03/16/18  1045   *  < > 136*  136*  136*  < > 139*  < > 147* 146* 146*  146*  146* 146* 164*   CALCIUM 8.3*  < > 8.4*  8.4*  8.4*  < > 8.7   < > 8.9 9.3 9.0  9.0  9.0 9.5  --    ALBUMIN 2.8*  < > 2.5*  2.5*  --  2.7*  --  2.6*  --  2.4*  2.4*  --   --    PROT 6.4  --  5.9*  --   --   --   --   --  7.0  --   --    *  < > 142  142  142  < > 142  < > 143 143 142  142  142 143  --    K 6.2*  < > 4.2  4.2  4.2  4.2  < > 4.2  < > 5.4* 5.4*  5.4* 5.0  5.0  5.0  5.0 4.7  4.7  --    CO2 15*  < > 25  25  25  < > 26  < > 18* 21* 20*  20*  20* 21*  --      < > 104  104  104  < > 102  < > 111* 108 106  106  106 106  --    BUN 43*  < > 13  13  13  < > 6  < > 4* 4* 4*  4*  4* 5*  --    CREATININE 3.7*  < > 1.3  1.3  1.3  < > 1.1  < > 0.8 0.8 0.8  0.8  0.8 0.7  --    ALKPHOS 259*  --  209*  --   --   --   --   --  195*  --   --    ALT 9*  --  9*  --   --   --   --   --  13  --   --    AST 28  --  35  --   --   --   --   --  45*  --   --    BILITOT 1.4*  --  1.4*  --   --   --   --   --  1.7*  --   --    < > = values in this interval not displayed.   Coagulation:   No results for input(s): PT, INR, APTT in the last 168 hours.  LDH:  No results for input(s): LDH in the last 72 hours.  Microbiology:  Microbiology Results (last 7 days)     Procedure Component Value Units Date/Time    Culture, Respiratory with Gram Stain [[id number]] Collected:  03/14/18 1137    Order Status:  Completed Specimen:  Respiratory from BAL, TRIPP Updated:  03/16/18 1046     Respiratory Culture No growth     Gram Stain (Respiratory) No WBC's     Gram Stain (Respiratory) No organisms seen    Culture, Respiratory with Gram Stain [[id number]] Collected:  03/15/18 1541    Order Status:  Completed Specimen:  Respiratory from Sputum Updated:  03/16/18 0951     Respiratory Culture No Growth     Gram Stain (Respiratory) <10 epithelial cells per low power field.     Gram Stain (Respiratory) Rare WBC's      Gram Stain (Respiratory) No organisms seen    AFB Culture & Smear [[id number]] Collected:  03/14/18 1137    Order Status:  Completed Specimen:  Bronchial Wash from  Amniotic Fluid Updated:  03/15/18 2127     AFB Culture & Smear Culture in progress     AFB CULTURE STAIN No acid fast bacilli seen.    Blood culture [[id number]] Collected:  03/13/18 1701    Order Status:  Completed Specimen:  Blood from Peripheral, Forearm, Left Updated:  03/15/18 2012     Blood Culture, Routine No Growth to date     Blood Culture, Routine No Growth to date     Blood Culture, Routine No Growth to date    Blood culture [[id number]] Collected:  03/13/18 1637    Order Status:  Completed Specimen:  Blood from Peripheral, Antecubital, Left Updated:  03/15/18 2012     Blood Culture, Routine No Growth to date     Blood Culture, Routine No Growth to date     Blood Culture, Routine No Growth to date    Respiratory Viral Panel by PCR [facility]; Nasal Swab [[id number]]  (Abnormal) Collected:  03/13/18 1733    Order Status:  Completed Specimen:  Respiratory Updated:  03/15/18 1302     Respiratory Virus Panel, source Nasal Swab     RVP - Adenovirus Not Detected     Comment: Detects Serotypes B and E. Detection of Serotype C may   be limited. If Adenovirus infection is suspected and a   Not Detected result is returned the sample should be   re-tested for Adenovirus using an independent method  (e.g. [employer] Adenovirus Quantitative Real-Time  PCR test.          Enterovirus Not Detected     Comment: Cross-reactivity has been observed between certain Rhinovirus  strains and the Enterovirus assay.          Human Bocavirus Not Detected     Human Coronavirus Not Detected     Comment: The Human Coronavirus assay detects Human coronavirus types  229E, OC43,NL63 and HKU1.          RVP - Human Metapneumovirus (hMPV) Not Detected     RVP - Influenza A Not Detected     Influenza A - [id number] Not Detected     RVP - Influenza B Not Detected     Parainfluenza Not Detected     Respiratory Syncytial VirusVirus (RSV) A Positive (A)     Comment: The Respiratory Syncytial Viral assay detects types A and B,  however it does not  distinguish between the two.          RVP - Rhinovirus Not Detected     Comment: Cross-Reactivity has been observed between certain   Rhinovirus strains and the Enterovirus assay.  Target Enriched Mulitplex Polymerase Chain Reaction (TEM-PCR)  allows for the detection of multiple pathogens out of a single  reaction.  This test was developed and its performance   characteristics determined by [employer].  It has not   been cleared or approved by the U.S.Food and Drug Administration.  Results should be used in conjunction with clinical findings,   and should not form the sole basis for a diagnosis or treatment  decision.  TEM-PCR is a licensed technology of [employer].         Narrative:       Receiving Lab:->[facility]    Culture, Respiratory with Gram Stain [[id number]] Collected:  03/13/18 1441    Order Status:  Completed Specimen:  Respiratory from Sputum, Expectorated Updated:  03/15/18 1237     Respiratory Culture Normal respiratory hank     Gram Stain (Respiratory) <10 epithelial cells per low power field.     Gram Stain (Respiratory) Rare WBC's     Gram Stain (Respiratory) Moderate Gram positive cocci     Gram Stain (Respiratory) Many Gram negative rods    Fungus culture [[id number]] Collected:  03/14/18 1137    Order Status:  Completed Specimen:  Bronchial Wash from Amniotic Fluid Updated:  03/15/18 1124     Fungus (Mycology) Culture Culture in progress    Stool culture **CANNOT BE ORDERED STAT** [[id number]] Collected:  03/11/18 1923    Order Status:  Completed Specimen:  Stool from Stool Updated:  03/14/18 1549     Stool Culture No enteric hank     Stool Culture No Salmonella,Shigella,Vibrio,Campylobacter,Yersinia isolated.    Culture, Fluid  (Aerobic) [[id number]] Collected:  03/14/18 1137    Order Status:  Canceled Specimen:  Bronchial Wash from Amniotic Fluid Updated:  03/14/18 1416    Cryptococcal antigen [[id number]] Collected:  03/14/18 0425    Order Status:  Completed Specimen:   Blood from Blood Updated:  03/14/18 1217     Cryptococcal Ag, Blood Negative    Culture, Respiratory with Gram Stain [[id number]] Collected:  03/13/18 1701    Order Status:  Completed Specimen:  Respiratory from Sputum, Expectorated Updated:  03/13/18 2224     Respiratory Culture Specimen inadequate - culture not performed     Gram Stain (Respiratory) >10epis/lfp and <than many WBC's     Gram Stain (Respiratory) Predominance of oropharyngeal hank. Please recollect.    Clostridium difficile EIA [[id number]] Collected:  03/11/18 1923    Order Status:  Completed Specimen:  Stool from Stool Updated:  03/11/18 2355     C. diff Antigen Negative     C difficile Toxins A+B, EIA Negative     Comment: Testing not recommended for children <24 months old.       Stool culture [[id number]]     Order Status:  Completed Specimen:  Stool from Stool     Stool culture [[id number]]     Order Status:  Canceled Specimen:  Stool from Stool     E. coli 0157 antigen [[id number]] Collected:  03/11/18 1923    Order Status:  Canceled Specimen:  Stool from Stool Updated:  03/11/18 2001        I have reviewed all pertinent labs within the past 24 hours.    Estimated Creatinine Clearance: 137.3 mL/min (based on SCr of 0.7 mg/dL).    Diagnostic Results:  I have reviewed all pertinent imaging results/findings within the past 24 hours.    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 Most recently has had ongoing issues with his lungs, had gotten 2 thoracenteses, after which he underwent VATS 01/19/18 followed by pleurex catheter placement and effusion drainage 01/11/18, subsequently had it removed 02/07/18 after drainage had decreased despite multiple attempts to drain it. Has been having significant coughing fits that result in him being near-syncopal at  times, although difficult to say if he has passed out or not- patient most recently was admitted to the hospital for increased AGUILAR, coughing and pre-syncope [place]11[place]02[place] [facility]   Patient was started on antibiotics for suspected bacterial infection, with some diarrhea, bronchitis, and possible pneumonia. Ct chest showed some pleural fluid collection and after talking with Dr. James, we arranged for him to receive a diagnostic tap with IR, from which the fluid collection demonstrated no growth or significant findings at all really. Cell counts do not appear to have been sent but will recheck. Patient states since his hospital stay, yesterday had a significant coughing fit again, after which he nearly passed out, is being seen in clinic today for this. Denies any cardiopulmonary complaints, no leg swelling, has some abdominal swelling, which is moderate for him. Denies significant shortness of breath with mild exertion, had 6MWT yesterday to see if he qualified for home O2, and his O2 sats actually improved after recovery from where he started initially. He and his wife admit he is in bed most days, not very active.     Mr. Crocker presented to the ED 3/11/18 with approximately 3 weeks of worsening diarrhea and 2-3 days of sinus pressure, drainage, and worsened cough.  He notes that he had a coughing fit last night and passed out, coughed throughout most of the night.  This also happened on 2/25/18.  He last saw Dr Ferreira in clinic on 2/20/18 where he was taken off myfortic and switched to cellcept (he hadn't been on cellcept because of leukopenia when they tried post-transplant).  Though his diarrhea had improved after his last discharge, he states it has increased now to 15x/day, clear/yellow/green, no fevers, no exacerbating foods per say.  He was taken back off the cellcept and placed back on myfortic this past week and has not noticed immediate change in diarrhea. He also reports his baseline coughing fits  havent improved and are minimally responsive to tessalon pearls.  Came on last night, he lost consciousness during a fit once which is relatively normal for him, and had two more during HPI.  He notes no sick contacts but does state he's had some URI symptoms as above.  His baseline vitals are usually -120 and BP 90s/60s-110s/70s.  He reports a history of intermittent diarrhea - did have Cdiff many years ago but this smells different.  No abdominal pain, no nausea, no vomiting.  No blood in diarrhea.  Appears last c-scope in 2015 with no evidence of infection or inflammatory processes.  He does report IBS which is different that this.       * Acute respiratory failure with hypoxia    -S/P Bronch 3/14. Awaiting cultures.   -Appreciate PUL/ID Cx. RCV positive. Started Ribavarin/IVIG.  -Continue empiric vanc/zosyn/azithromycin for broad bacterial coverage and posaconazole for nodular appearing lesions in lungs   -Started induction GCV until invasive CMV is excluded.  -Aspergillus antigen, fungitell, urine histo/blasto antigens, crypto antigen, RVP, urine legionella, and Quantiferon pending.        Septic shock    -Continue Levo for MAP 60.  -Added hydrocortisone 100mg TID.  -IV Abx as above.   -Tube feeds as tolerated.        Restrictive cardiomyopathy    -S/P thoracentesis X 2, followed by VATS/pleurex cath placement (January 2018) with removal of pleurex (February 2018) and 3rd thoracentesis 2/14/18 with no significant findings on fluid removal. Moderate right pleural effusion stable by CXR 3/11/18. Awaiting chest CT today  -Last paracentesis was in January. Abd US 3/12/18 confirmed ongoing ascites (not tense at all). Will consider getting paracentesis this admit  -Continue Levophed for MAP 60.         Acute renal failure superimposed on stage 4 chronic kidney disease    - CVP 12 this am.  - Appreciate Neph Cx. Continue CRRT as tolerated.         Heart transplanted    - TTE 3/12/18 showed LVEF 50-55% (but no  substantial change when compared to previous echo), RV normal and IVC 8  - Had -ve DSE on 11/30/17  - Current immunosuppression: Pred/Prograf/MMF on hold. Continue hydrocortisone 100mg Q8H.         Diarrhea    - No more diarrhea.  - C. Diff -ve. Other stool studies pending  - CMV DNA and Norovirus PCR pending  - Strongyloides ab, adenovirus EIA, and Nada stool PCR panel pending  - Consulted GI - plan upper and lower endoscopy once more stable.         Leukopenia    - WBC count stable. May require neupogen if trends down with current treatment.        Cough    - See resp failure above.        Type 1 diabetes mellitus with stage 3 chronic kidney disease    - Appreciate Endocrine's recs        Hypothyroidism due to Hashimoto's thyroiditis    - TSH low at 0.101 with normal FT4. Appreciate Endocrine's recs          Critical Care Time: 50 minutes     Critical care was time spent personally by me on the following activities: development of treatment plan with patient or surrogate and bedside caregivers, discussions with consultants, evaluation of patient's response to treatment, examination of patient, ordering and performing treatments and interventions, ordering and review of laboratory studies, ordering and review of radiographic studies, pulse oximetry, re-evaluation of patient's condition. This critical care time did not overlap with that of any other provider or involve time for any procedures.      Tim Mao NP  Heart Transplant  [facility]

## 2018-03-16 NOTE — PLAN OF CARE
Problem: Patient Care Overview  Goal: Plan of Care Review  Outcome: Ongoing (interventions implemented as appropriate)  POC:  Pt remains on ventilator at FIO2 40%, PEEP 5, . No complications this shift.  Pt remains on Levophed (able to decrease infusion through the shift) maintaining MAP above 60. Fentanyl, and Versed (See noted for details of Versed and Precedex) Insulin re-started by Endocrine MD this shift at a continuous rate of 0.5 units/hr with hourly glucose checks.   Pt RASS -3. Withdraws to pain. Gag, cough corneal reflexes intact. Pulses palpable.  Only 10cc urine out all shift. Pt remains on CRRT with no complications UF @ 350.  CVP 12.  All vitals stable, pt BP does decrease when he is stimulated or, moved etc and Levophed titrated accordingly.  Plan is to keep pt calm for now and wean down Levophed. Pulmonary hopeful to start weaning ventilator in next few days.  Pt spouse at bedside all shift and in agreement with POC. All questions answered and concerns addressed.  Will continue to monitor.

## 2018-03-16 NOTE — ASSESSMENT & PLAN NOTE
- baseline sCr 2.6-3.0 consistent with CKD stage IV  - anuric CACHORRO; likely ischemic ATN from prolonged pre-renal state (diarrhea) in setting of prograf renal vasoconstriction; cannot r/o septic ATN or Prograf toxicity  - SLED started 3/14  - net negative only 300cc yesterday (600cc/48 hours)  - UOP only 30cc/24h. Can d/c osullivan catheter if daily bladder scans are performed  - continue SLED today for metabolic clearance and volume management; UF goal 250-350cc/hr. 3K/35bicarb bath.

## 2018-03-16 NOTE — ASSESSMENT & PLAN NOTE
-S/P thoracentesis X 2, followed by VATS/pleurex cath placement (January 2018) with removal of pleurex (February 2018) and 3rd thoracentesis 2/14/18 with no significant findings on fluid removal. Moderate right pleural effusion stable by CXR 3/11/18. Awaiting chest CT today  -Last paracentesis was in January. Abd US 3/12/18 confirmed ongoing ascites (not tense at all). Will consider getting paracentesis this admit  -Continue Levophed for MAP 60.

## 2018-03-17 PROBLEM — Z78.9 ON ENTERAL NUTRITION: Status: ACTIVE | Noted: 2018-01-01

## 2018-03-17 NOTE — ASSESSMENT & PLAN NOTE
- baseline sCr 2.6-3.0 consistent with CKD stage IV  - anuric CACHORRO; likely ischemic ATN from prolonged pre-renal state (diarrhea) in setting of prograf renal vasoconstriction; cannot r/o septic ATN or Prograf toxicity  - SLED started 3/14  - Continues to be anuric only 10 cc in 24 hrs  - continue SLED today for metabolic clearance and volume management; UF goal 300 cc/hr. As tolerated by patient, maintain MAP>65

## 2018-03-17 NOTE — PROGRESS NOTES
Dr. Franklin notified of pt increasing pressor requirements to maintained MAP >60. MD to bedside. No new orders. See flow sheet for full documentation.

## 2018-03-17 NOTE — SUBJECTIVE & OBJECTIVE
Interval History: Patient is sedated and on the vent. Will have episodes of high peak pressures in the 60's - these episodes seem to be related to agitation - he seems to bear down (intermittent abdominal clenching noted). Otherwise, no SBT attempted today - continues to be sedated. Fentanyl weaned down.    Objective:     Vital Signs (Most Recent):  Temp: 97.5 °F (36.4 °C) (03/17/18 1530)  Pulse: (!) 116 (03/17/18 1545)  Resp: (!) 0 (03/17/18 0740)  BP: (!) 107/51 (03/17/18 1330)  SpO2: 98 % (03/17/18 1545) Vital Signs (24h Range):  Temp:  [94.3 °F (34.6 °C)-97.8 °F (36.6 °C)] 97.5 °F (36.4 °C)  Pulse:  [] 116  Resp:  [0-31] 0  SpO2:  [93 %-100 %] 98 %  BP: (107)/(51) 107/51  Arterial Line BP: ()/(37-58) 111/50     Weight: 76.3 kg (168 lb 3.4 oz)  Body mass index is 26.35 kg/m².    Intake/Output Summary (Last 24 hours) at 03/17/18 1608  Last data filed at 03/17/18 1600   Gross per 24 hour   Intake          7053.44 ml   Output             7082 ml   Net           -28.56 ml     Physical Exam   Constitutional: He is oriented to person, place, and time. He appears well-developed and well-nourished.   HENT:   Head: Normocephalic and atraumatic.   Eyes: Conjunctivae are normal.   Cardiovascular: Normal rate, regular rhythm and normal heart sounds.    Pulmonary/Chest: Effort normal. He has no wheezes. He has no rales.   Intubated, bronchial breath sounds on the R, clear on the L   Abdominal: Soft. Bowel sounds are normal. He exhibits no distension. There is no tenderness.   Musculoskeletal: He exhibits no edema.   Neurological: He is alert and oriented to person, place, and time.   Skin: Skin is warm and dry.   Nursing note and vitals reviewed.    Vents:  Vent Mode: A/C (03/17/18 1511)  Ventilator Initiated: Yes (03/14/18 0932)  Set Rate: 21 bmp (03/17/18 1511)  Vt Set: 450 mL (03/17/18 1511)  Pressure Support: 0 cmH20 (03/17/18 1511)  PEEP/CPAP: 5 cmH20 (03/17/18 1511)  Oxygen Concentration (%): 40 (03/17/18  3685)  Peak Airway Pressure: 38 cmH2O (03/17/18 1511)  Plateau Pressure: 36 cmH20 (03/17/18 1511)  Total Ve: 9.93 mL (03/17/18 1511)  F/VT Ratio<105 (RSBI): (!) 0 (03/17/18 0740)    Lines/Drains/Airways     Central Venous Catheter Line                 Trialysis (Dialysis) Catheter 03/14/18 0813 right internal jugular 3 days          Drain                 NG/OG Tube 03/14/18 1300 [facility] mouth 3 days         Urethral Catheter 03/14/18 1125 [place] 3 days          Airway                 Airway - Non-Surgical 03/14/18 0930 Endotracheal Tube 3 days          Arterial Line                 Arterial Line 03/14/18 1600 Right Femoral 3 days          Peripheral Intravenous Line                 Peripheral IV - Single Lumen 03/13/18 1600 Left Antecubital 4 days         Peripheral IV - Single Lumen 03/14/18 1736 Right Forearm 2 days         Midline Catheter Insertion/Assessment  - Single Lumen 03/17/18 1454 Left cephalic vein (lateral side of arm) 18g x 8cm less than 1 day              Significant Labs:    CBC/Anemia Profile:    Recent Labs  Lab 03/16/18  0328 03/17/18  0413   WBC 5.16 5.11   HGB 7.8* 7.3*   HCT 24.7* 22.1*    215   MCV 93 90   RDW 13.6 13.9     Chemistries:    Recent Labs  Lab 03/16/18  0328  03/16/18  2237 03/17/18  0032 03/17/18  0414 03/17/18  0806 03/17/18  1351     142  142  < > 143 142 145  145  145  145 143 144   K 5.0  5.0  5.0  5.0  < > 3.6 3.7  3.7 4.1  4.1  4.1  4.1  4.1  4.1  4.1 4.1  4.1 4.4     106  106  < > 103 103 102  102  102  102 102 104   CO2 20*  20*  20*  < > 23 20* 24  24  24  24 24 21*   BUN 4*  4*  4*  < > 5* 5* 6  6  6  6 6 6   CREATININE 0.8  0.8  0.8  < > 0.8 0.7 0.7  0.7  0.7  0.7 0.7 0.7   CALCIUM 9.0  9.0  9.0  < > 9.5 9.6 9.7  9.7  9.7  9.7 9.1 8.9   ALBUMIN 2.4*  2.4*  < > 2.5*  --  2.5*  2.5*  2.5*  --  2.4*   PROT 7.0  --   --   --  6.8  --   --    BILITOT 1.7*  --   --   --  2.1*  --   --    ALKPHOS 195*  --    --   --  200*  --   --    ALT 13  --   --   --  13  --   --    AST 45*  --   --   --  51*  --   --    MG 2.2  < >  --  2.3  2.3 2.1  --  1.7   PHOS 2.7  < > 1.7*  --  2.4*  2.4*  --  2.7   < > = values in this interval not displayed.    All pertinent labs within the past 24 hours have been reviewed.    Significant Imaging:  I have reviewed and interpreted all pertinent imaging results/findings within the past 24 hours.

## 2018-03-17 NOTE — CONSULTS
Single lumen 18g x 8cm midline placed to (L) CEPHALIC vein.  Max dwell date 04  15  2018, Lot# [id number] Needle advances into vein under realtime Ultrasound guidance, image recorded and saved.

## 2018-03-17 NOTE — PROGRESS NOTES
[facility]  Nephrology  Progress Note    Patient Name: Lv Crocker  MRN: [id number]  Admission Date: 3/11/2018  Hospital Length of Stay: 5 days  Attending Provider: Jose A Lloyd MD   Primary Care Physician: Philippe Mohr MD  Principal Problem:Acute respiratory failure with hypoxia    Subjective:     HPI: Mr. Crocker is a 43 yo WM with T1DM, Hashimoto thyroiditis, h/o OHTx 11/2014, and CKD stage 4 who was admitted on 3/11/18 for persistent diarrhea. He reported a 3 week history of diarrhea up to 15x/day. Associated symptoms including URI symptoms, coughing fits, and coughing syncope. Prograf level was 14.3. His post-heart transplant course has been complicated by AMR 4/2015, CMV, post-transplant restrictive cardiomyopathy, recurrent pleural effusions/ascites requiring monthly thoracenteses/paracenteses, VATS 1/11/18 with Pleur-X catheter (now removed), and CKD stage 4 with baseline sCr 2.6-3.0. Baseline -120s and baseline BP 90s-110s/60-70s. Upon admission he was started on IVF and diarrhea workup was initiated. On 3/12 he was noted to have decreased UOP despite fluids; NS was increased to 100cc/hr. He was scheduled for a colonoscopy on 3/13 however procedure was cancelled due to hypoxia and fever. He was transferred to the ICU for closer monitoring. He continued to have oliguria overnight. Bladder scan revealed 200cc of urine but patient refused osullivan catheter placement. Wife reports that patient always has a hard time urinating again after osullivan catheters are placed/removed so he refuses them. He remained hypoxic despite FiO2 70% and ABG revealed combined respiratory and metabolic acidosis with pH 7.17. He was started on BiPAP as well as a bicarb infusion at 50cc/hr. ABG with mild improvement. AM labs revealed K 6.2 and he was shifted and given kayexalate.Trialysis catheter was placed this morning and he subsequently developed hypotension and was started on levophed. He was  intubated later this morning. Nephrology consulted for CACHORRO. All history obtained by primary team and chart review as patient was intubated/sedated on exam. Consent for dialysis obtained by patient's wife and placed in chart. Of note, both of patient's parents were on dialysis.     Interval History:   Patient evaluated at bedside with family member present, continue on mechanical ventilator, no distress, still on pressors for control of blood pressures, Total intake 7.1 L and output 8.5 L. For NET negative 1.3 L.     Review of patient's allergies indicates:   Allergen Reactions    No known drug allergies      Current Facility-Administered Medications   Medication Frequency    0.9%  NaCl infusion (CRRT USE ONLY) Continuous    acetaminophen tablet 325 mg Q4H PRN    azithromycin (ZITHROMAX) 500 mg in sodium chloride 0.9% 250 mL IVPB Q24H    chlorhexidine 0.12 % solution 15 mL BID    dexmedetomidine (PRECEDEX) 400mcg/100mL 0.9% NaCL infusion Continuous    dextrose 50% injection 12.5 g PRN    dextrose 50% injection 25 g PRN    docusate 50 mg/5 mL liquid 100 mg BID    escitalopram oxalate tablet 20 mg Daily    famotidine (PF) injection 20 mg Daily    fentaNYL 2500 mcg in 0.9% sodium chloride 250 mL infusion premix (titrating) Continuous    ganciclovir (CYTOVENE) 200 mg in sodium chloride 0.9% 100 mL IVPB Q24H    glucagon (human recombinant) injection 1 mg PRN    glucose chewable tablet 16 g PRN    glucose chewable tablet 24 g PRN    heparin (porcine) injection 5,000 Units Q8H    hydrocortisone sodium succinate injection 100 mg Q8H    insulin regular (Humulin R) 100 Units in sodium chloride 0.9% 100 mL infusion Continuous    k phos di & mono-sod phos mono 250 mg tablet 2 tablet Q4H PRN    levothyroxine tablet 125 mcg Before breakfast    midazolam (VERSED) 1 mg/mL in sodium chloride 0.9% 100 mL infusion (titrating) Continuous    norepinephrine 16 mg in dextrose 5 % 250 mL infusion Continuous     piperacillin-tazobactam 4.5 g in sodium chloride 0.9% 100 mL IVPB (ready to mix system) Q8H    polyethylene glycol packet 17 g Daily    posaconazole 300 mg in dextrose 5 % 150 mL infusion Daily    ribavirin capsule 600 mg BID    sodium chloride 0.9% flush 3 mL Q8H    vancomycin 1 gram/250 mL in sodium chloride 0.9% IVPB 1 g Q24H       Objective:     Vital Signs (Most Recent):  Temp: (!) 94.6 °F (34.8 °C) (03/17/18 1100)  Pulse: 96 (03/17/18 1100)  Resp: (!) 0 (03/17/18 0740)  BP: (!) 142/73 (03/15/18 0731)  SpO2: 96 % (03/17/18 1100)  O2 Device (Oxygen Therapy): ventilator (03/17/18 0740) Vital Signs (24h Range):  Temp:  [94.3 °F (34.6 °C)-97.8 °F (36.6 °C)] 94.6 °F (34.8 °C)  Pulse:  [] 96  Resp:  [0-31] 0  SpO2:  [95 %-100 %] 96 %  Arterial Line BP: ()/(40-58) 102/44     Weight: 76.3 kg (168 lb 3.4 oz) (03/17/18 0630)  Body mass index is 26.35 kg/m².  Body surface area is 1.9 meters squared.    I/O last 3 completed shifts:  In: 91982.1 [I.V.:9153.1; NG/GT:690; IV Piggyback:1650]  Out: 15357 [Urine:15; Other:99894]    Physical Exam   Constitutional: He appears well-developed and well-nourished. He is sedated and intubated.   HENT:   Head: Normocephalic and atraumatic.   Neck: Neck supple. No thyromegaly present.   Cardiovascular: Normal rate and regular rhythm.    Pulmonary/Chest: He is intubated. He has no wheezes. He has no rales.   Abdominal: Soft. He exhibits no distension.   Musculoskeletal: He exhibits no deformity. Edema: no pitting edema.   Skin: Skin is warm and dry. He is not diaphoretic.       Significant Labs:  ABGs:   Recent Labs  Lab 03/17/18  0520   PH 7.459*   PCO2 37.3   HCO3 26.5   POCSATURATED 79*   BE 3     BMP:   Recent Labs  Lab 03/17/18  0414 03/17/18  0806   *  133*  133*  133* 125*     102  102  102 102   CO2 24  24  24  24 24   BUN 6  6  6  6 6   CREATININE 0.7  0.7  0.7  0.7 0.7   CALCIUM 9.7  9.7  9.7  9.7 9.1   MG 2.1  --      CBC:    Recent Labs  Lab 03/17/18  0413   WBC 5.11   RBC 2.47*   HGB 7.3*   HCT 22.1*      MCV 90   MCH 29.6   MCHC 33.0     CMP:   Recent Labs  Lab 03/17/18 0414 03/17/18  0806   *  133*  133*  133* 125*   CALCIUM 9.7  9.7  9.7  9.7 9.1   ALBUMIN 2.5*  2.5*  2.5*  --    PROT 6.8  --      145  145  145 143   K 4.1  4.1  4.1  4.1  4.1  4.1  4.1 4.1  4.1   CO2 24  24  24  24 24     102  102  102 102   BUN 6  6  6  6 6   CREATININE 0.7  0.7  0.7  0.7 0.7   ALKPHOS 200*  --    ALT 13  --    AST 51*  --    BILITOT 2.1*  --      All labs within the past 24 hours have been reviewed.     Assessment/Plan:     Acute renal failure superimposed on stage 4 chronic kidney disease    - baseline sCr 2.6-3.0 consistent with CKD stage IV  - anuric CACHORRO; likely ischemic ATN from prolonged pre-renal state (diarrhea) in setting of prograf renal vasoconstriction; cannot r/o septic ATN or Prograf toxicity  - SLED started 3/14  - Continues to be anuric only 10 cc in 24 hrs  - continue SLED today for metabolic clearance and volume management; UF goal 300 cc/hr. As tolerated by patient, maintain MAP>65           Edson Soriano  Nephrology  Fellow  [facility]    Pager [phone]

## 2018-03-17 NOTE — SUBJECTIVE & OBJECTIVE
Interval History: Intubated and sedated. Agitated with hypotension during sedation breaks. Tube feeds at 20 cc/hr with residuals ~ 200 cc. No bowel sounds appreciated, no bowel movements    Continuous Infusions:   sodium chloride 0.9% 200 mL/hr at 03/16/18 1158    dexmedetomidine (PRECEDEX) infusion 1 mcg/kg/hr (03/17/18 0500)    fentanyl 10 mL/hr at 03/17/18 0500    insulin (HUMAN R) infusion (adults) 0.1 Units/hr (03/17/18 0500)    midazolam (VERSED) infusion (titrating) 10 mg/hr (03/16/18 2200)    norepinephrine bitartrate-D5W 0.16 mcg/kg/min (03/17/18 0500)     Scheduled Meds:   azithromycin (ZITHROMAX) 500 mg IVPB  500 mg Intravenous Q24H    chlorhexidine  15 mL Mouth/Throat BID    docusate  100 mg Per NG tube BID    escitalopram oxalate  20 mg Per NG tube Daily    famotidine (PF)  20 mg Intravenous Daily    ganciclovir (CYTOVENE) IVPB  200 mg Intravenous Q24H    heparin (porcine)  5,000 Units Subcutaneous Q8H    hydrocortisone sodium succinate  100 mg Intravenous Q8H    levothyroxine  125 mcg Per NG tube Before breakfast    piperacillin-tazobactam (ZOSYN) IVPB  4.5 g Intravenous Q8H    polyethylene glycol  17 g Oral Daily    posaconazole (NOXAFIL) 300 mg in dextrose 5% IVPB  300 mg Intravenous Daily    ribavirin  600 mg Oral BID    sodium chloride 0.9%  3 mL Intravenous Q8H    vancomycin (VANCOCIN) IVPB  1,000 mg Intravenous Q24H     PRN Meds:acetaminophen, dextrose 50%, dextrose 50%, glucagon (human recombinant), glucose, glucose, k phos di & mono-sod phos mono    Review of patient's allergies indicates:   Allergen Reactions    No known drug allergies      Objective:     Vital Signs (Most Recent):  Temp: 97.8 °F (36.6 °C) (03/17/18 0300)  Pulse: 96 (03/17/18 0500)  Resp: (!) 24 (03/17/18 0500)  BP: (!) 142/73 (03/15/18 0731)  SpO2: 100 % (03/17/18 0500) Vital Signs (24h Range):  Temp:  [96.9 °F (36.1 °C)-97.8 °F (36.6 °C)] 97.8 °F (36.6 °C)  Pulse:  [] 96  Resp:  [0-24] 24  SpO2:   [98 %-100 %] 100 %  Arterial Line BP: (100-144)/(41-58) 110/50     Patient Vitals for the past 72 hrs (Last 3 readings):   Weight   03/16/18 0626 81 kg (178 lb 9.2 oz)   03/15/18 1000 81 kg (178 lb 9.2 oz)     Body mass index is 27.97 kg/m².      Intake/Output Summary (Last 24 hours) at 03/17/18 0521  Last data filed at 03/17/18 0500   Gross per 24 hour   Intake          7116.92 ml   Output             8526 ml   Net         -1409.08 ml       Hemodynamic Parameters:       Telemetry: SR-ST    Physical Exam   Constitutional: He appears well-developed and well-nourished.   Appears ill   HENT:   Head: Normocephalic and atraumatic.   Eyes: Conjunctivae are normal. Pupils are equal, round, and reactive to light.   Neck: No thyromegaly present.   RIJ trialysis   Cardiovascular: Regular rhythm.    Tachycardic   Pulmonary/Chest:   Intubated and ventilated  Breath sounds are slightly decreased right base. Essentially CTA bilaterally   Abdominal: Soft.   No bowel sounds. + ascites   Musculoskeletal:   1-2+ BUE edema; no MITZY; no cyanosis   Neurological:   Arouses and becomes agitated with sedation breaks   Skin: Skin is warm and dry. Capillary refill takes less than 2 seconds. There is pallor.       Significant Labs:  CBC:    Recent Labs  Lab 03/15/18  0406 03/16/18  0328 03/17/18  0413   WBC 6.87 5.16 5.11   RBC 2.85* 2.66* 2.47*   HGB 8.5* 7.8* 7.3*   HCT 24.9* 24.7* 22.1*    211 215   MCV 87 93 90   MCH 29.8 29.3 29.6   MCHC 34.1 31.6* 33.0     BNP:    Recent Labs  Lab 03/11/18  1922 03/13/18  0815   * 359*     CMP:    Recent Labs  Lab 03/15/18  0406  03/16/18  0328  03/16/18  1352  03/16/18  2237 03/17/18  0032 03/17/18  0414   *  136*  136*  < > 146*  146*  146*  < > 167*  167*  < > 131* 138* 133*  133*  133*  133*   CALCIUM 8.4*  8.4*  8.4*  < > 9.0  9.0  9.0  < > 9.5  9.5  < > 9.5 9.6 9.7  9.7  9.7  9.7   ALBUMIN 2.5*  2.5*  < > 2.4*  2.4*  --  2.4*  2.4*  --  2.5*  --  2.5*  2.5*   2.5*   PROT 5.9*  --  7.0  --   --   --   --   --  6.8     142  142  < > 142  142  142  < > 143  143  < > 143 142 145  145  145  145   K 4.2  4.2  4.2  4.2  < > 5.0  5.0  5.0  5.0  < > 4.4  4.4  < > 3.6 3.7  3.7 4.1  4.1  4.1  4.1  4.1  4.1  4.1   CO2 25  25  25  < > 20*  20*  20*  < > 20*  20*  < > 23 20* 24  24  24  24     104  104  < > 106  106  106  < > 106  106  < > 103 103 102  102  102  102   BUN 13  13  13  < > 4*  4*  4*  < > 5*  5*  < > 5* 5* 6  6  6  6   CREATININE 1.3  1.3  1.3  < > 0.8  0.8  0.8  < > 0.7  0.7  < > 0.8 0.7 0.7  0.7  0.7  0.7   ALKPHOS 209*  --  195*  --   --   --   --   --  200*   ALT 9*  --  13  --   --   --   --   --  13   AST 35  --  45*  --   --   --   --   --  51*   BILITOT 1.4*  --  1.7*  --   --   --   --   --  2.1*   < > = values in this interval not displayed.   Coagulation:   No results for input(s): PT, INR, APTT in the last 168 hours.  LDH:  No results for input(s): LDH in the last 72 hours.  Microbiology:  Microbiology Results (last 7 days)     Procedure Component Value Units Date/Time    Blood culture [[id number]] Collected:  03/13/18 1637    Order Status:  Completed Specimen:  Blood from Peripheral, Antecubital, Left Updated:  03/16/18 2012     Blood Culture, Routine No Growth to date     Blood Culture, Routine No Growth to date     Blood Culture, Routine No Growth to date     Blood Culture, Routine No Growth to date    Blood culture [[id number]] Collected:  03/13/18 1701    Order Status:  Completed Specimen:  Blood from Peripheral, Forearm, Left Updated:  03/16/18 2012     Blood Culture, Routine No Growth to date     Blood Culture, Routine No Growth to date     Blood Culture, Routine No Growth to date     Blood Culture, Routine No Growth to date    Culture, Respiratory with Gram Stain [[id number]] Collected:  03/14/18 1137    Order Status:  Completed Specimen:  Respiratory from BAL, TRIPP Updated:  03/16/18  1046     Respiratory Culture No growth     Gram Stain (Respiratory) No WBC's     Gram Stain (Respiratory) No organisms seen    Culture, Respiratory with Gram Stain [[id number]] Collected:  03/15/18 1541    Order Status:  Completed Specimen:  Respiratory from Sputum Updated:  03/16/18 0951     Respiratory Culture No Growth     Gram Stain (Respiratory) <10 epithelial cells per low power field.     Gram Stain (Respiratory) Rare WBC's      Gram Stain (Respiratory) No organisms seen    AFB Culture & Smear [[id number]] Collected:  03/14/18 1137    Order Status:  Completed Specimen:  Bronchial Wash from Amniotic Fluid Updated:  03/15/18 2127     AFB Culture & Smear Culture in progress     AFB CULTURE STAIN No acid fast bacilli seen.    Respiratory Viral Panel by PCR [facility]; Nasal Swab [[id number]]  (Abnormal) Collected:  03/13/18 1733    Order Status:  Completed Specimen:  Respiratory Updated:  03/15/18 1302     Respiratory Virus Panel, source Nasal Swab     RVP - Adenovirus Not Detected     Comment: Detects Serotypes B and E. Detection of Serotype C may   be limited. If Adenovirus infection is suspected and a   Not Detected result is returned the sample should be   re-tested for Adenovirus using an independent method  (e.g. [employer] Adenovirus Quantitative Real-Time  PCR test.          Enterovirus Not Detected     Comment: Cross-reactivity has been observed between certain Rhinovirus  strains and the Enterovirus assay.          Human Bocavirus Not Detected     Human Coronavirus Not Detected     Comment: The Human Coronavirus assay detects Human coronavirus types  229E, OC43,NL63 and HKU1.          RVP - Human Metapneumovirus (hMPV) Not Detected     RVP - Influenza A Not Detected     Influenza A - P4H1-98 Not Detected     RVP - Influenza B Not Detected     Parainfluenza Not Detected     Respiratory Syncytial VirusVirus (RSV) A Positive (A)     Comment: The Respiratory Syncytial Viral assay detects types A and  B,  however it does not distinguish between the two.          RVP - Rhinovirus Not Detected     Comment: Cross-Reactivity has been observed between certain   Rhinovirus strains and the Enterovirus assay.  Target Enriched Mulitplex Polymerase Chain Reaction (TEM-PCR)  allows for the detection of multiple pathogens out of a single  reaction.  This test was developed and its performance   characteristics determined by [facility].  It has not   been cleared or approved by the U.S.Food and Drug Administration.  Results should be used in conjunction with clinical findings,   and should not form the sole basis for a diagnosis or treatment  decision.  TEM-PCR is a licensed technology of [employer].         Narrative:       Receiving Lab:->[facility]    Culture, Respiratory with Gram Stain [[id number]] Collected:  03/13/18 1441    Order Status:  Completed Specimen:  Respiratory from Sputum, Expectorated Updated:  03/15/18 1237     Respiratory Culture Normal respiratory hank     Gram Stain (Respiratory) <10 epithelial cells per low power field.     Gram Stain (Respiratory) Rare WBC's     Gram Stain (Respiratory) Moderate Gram positive cocci     Gram Stain (Respiratory) Many Gram negative rods    Fungus culture [[id number]] Collected:  03/14/18 1137    Order Status:  Completed Specimen:  Bronchial Wash from Amniotic Fluid Updated:  03/15/18 1124     Fungus (Mycology) Culture Culture in progress    Stool culture **CANNOT BE ORDERED STAT** [[id number]] Collected:  03/11/18 1923    Order Status:  Completed Specimen:  Stool from Stool Updated:  03/14/18 1549     Stool Culture No enteric hank     Stool Culture No Salmonella,Shigella,Vibrio,Campylobacter,Yersinia isolated.    Culture, Fluid  (Aerobic) [[id number]] Collected:  03/14/18 1137    Order Status:  Canceled Specimen:  Bronchial Wash from Amniotic Fluid Updated:  03/14/18 1416    Cryptococcal antigen [[id number]] Collected:  03/14/18 0425    Order Status:   Completed Specimen:  Blood from Blood Updated:  03/14/18 1217     Cryptococcal Ag, Blood Negative    Culture, Respiratory with Gram Stain [[id number]] Collected:  03/13/18 1701    Order Status:  Completed Specimen:  Respiratory from Sputum, Expectorated Updated:  03/13/18 2224     Respiratory Culture Specimen inadequate - culture not performed     Gram Stain (Respiratory) >10epis/lfp and <than many WBC's     Gram Stain (Respiratory) Predominance of oropharyngeal hank. Please recollect.    Clostridium difficile EIA [[id number]] Collected:  03/11/18 1923    Order Status:  Completed Specimen:  Stool from Stool Updated:  03/11/18 2355     C. diff Antigen Negative     C difficile Toxins A+B, EIA Negative     Comment: Testing not recommended for children <24 months old.       Stool culture [[id number]]     Order Status:  Completed Specimen:  Stool from Stool     Stool culture [[id number]]     Order Status:  Canceled Specimen:  Stool from Stool     E. coli 0157 antigen [[id number]] Collected:  03/11/18 1923    Order Status:  Canceled Specimen:  Stool from Stool Updated:  03/11/18 2001          I have reviewed all pertinent labs within the past 24 hours.    Estimated Creatinine Clearance: 137.3 mL/min (based on SCr of 0.7 mg/dL).    Diagnostic Results:  I have reviewed all pertinent imaging results/findings within the past 24 hours.

## 2018-03-17 NOTE — ASSESSMENT & PLAN NOTE
-S/P thoracentesis X 2, followed by VATS/pleurex cath placement (January 2018) with removal of pleurex (February 2018) and 3rd thoracentesis 2/14/18 with no significant findings on fluid removal. Moderate right pleural effusion stable by CXR 3/11/18 and chest CT 3/13/18  -Last paracentesis was in January. Abd US 3/12/18 confirmed ongoing ascites (not tense at all). Will consider getting paracentesis this admit  -Continue Levophed for MAP 60.

## 2018-03-17 NOTE — ASSESSMENT & PLAN NOTE
- No more diarrhea.  - C. Diff -ve. Other stool studies pending  - CMV DNA and Norovirus PCR -ve  - Strongyloides ab, adenovirus EIA, and [facility] stool PCR panel pending  - Consulted GI - plan upper and lower endoscopy once more stable.

## 2018-03-17 NOTE — ASSESSMENT & PLAN NOTE
This is a case of hypoxic/hypercapnic respiratory failure in the setting of heart transplant (11/2014), restrictive cardiomyopathy, low compliance of the R lung, and (+) RSV. Patient was admitted on 3/11 and intubated on 3/14. CXR and CT chest from 3/14 show significant pulmonary edema - oxygenation has improved since that time with aggressive volume removal on CRRT. With patient's history of nausea/vomiting, there was also concern for aspiration PNA.     Today's CXR shows marked improvement with patient's vent settings at PEEP 5 and FiO2 of 40%. Patient is sedated on Fentanyl 50 mcg and Precedex at this time. He has been off of Versed drip for 24 hours. Peak pressures are elevated with plateau pressures around 30. He is currently on Vanc, Zosyn, and Azithromycin.    -Bronch culture data pending. (+) RSV. Agree with continuing ABX at this time while waiting for culture data.  -Continue volume removal with CRRT for (-) net. CXR in the a.m.  -Continue Fentanyl and Precedex for sedation. Do not start benzodiazepine.  -Patient continues to have high peak/plateau pressures. Considering whether this is related to low compliance of his R lung. Will continue to monitor. He does have episodes of abdominal contraction and bearing down that causes his peak pressures to rise into the 50's-60's with agitation. Did not attempt SBT for this reason.  -Will re-evaluate tomorrow with likely SBT.

## 2018-03-17 NOTE — PROGRESS NOTES
"[facility]  Endocrinology  Progress Note    Admit Date: 3/11/2018     Reason for Consult: abnormal TFTs    Surgical Procedure and Date: s/p heart transplant 2014     Diabetes diagnosis year: 7yo (T1DM)     Home Diabetes Medications:    Levemir 15u qHS  Novolog 4u AC     How often checking glucose at home? 4 times daily   BG readings on regimen: 150-200s  Hypoglycemia on the regimen?  Yes, rarely - treats appropriately (light snack, glucose tab)  Missed doses on regimen?  No        Diabetes Complications include:     Hyperglycemia, Hypoglycemia  and Diabetic chronic kidney disease          Complicating diabetes co morbidities:   N/A     HPI:   Patient is a 42 y.o. male with a diagnosis of T1DM, HTN, hypothyroidism, s/p heart transplant.  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 He required serial paracentesis and thoracentesis until he underwent a VATS with pleurX catheter placement on 1/11/2018 and removed 2/7/18.       Presented to hospital secondary to diarrhea and cough.  Upon initial labs, TSH was decreased (0.101) and free T4 1.33.  Endocrinology consulted to assist in management of these labs.  He was last seen in clinic by Kamala Vázquez NP 11/2017.   Previous hospitalization, endocrine consulted for same problem.  During that visit, recommended decreasing LT4 to 125mcg daily. Unfortunately, patient was discharged on previous dose of 150mcg daily.     Interval HPI:   Overnight events: minimal insulin requirements. Primary team may extubate today  Eating:   NPO  Nausea: No  Hypoglycemia and intervention: No  Fever: No  TPN and/or TF: Yes  If yes, type of TF/TPN and rate: 20cc/hr    BP (!) 142/73   Pulse 96   Temp 96 °F (35.6 °C) (Axillary)   Resp (!) 0   Ht 5' 7" (1.702 m)   Wt 76.3 kg (168 lb 3.4 oz)   SpO2 96%   BMI 26.35 kg/m²       Labs Reviewed and Include      Recent " Labs  Lab 03/17/18  0414 03/17/18  0806   *  133*  133*  133* 125*   CALCIUM 9.7  9.7  9.7  9.7 9.1   ALBUMIN 2.5*  2.5*  2.5*  --    PROT 6.8  --      145  145  145 143   K 4.1  4.1  4.1  4.1  4.1  4.1  4.1 4.1  4.1   CO2 24  24  24  24 24     102  102  102 102   BUN 6  6  6  6 6   CREATININE 0.7  0.7  0.7  0.7 0.7   ALKPHOS 200*  --    ALT 13  --    AST 51*  --    BILITOT 2.1*  --      Lab Results   Component Value Date    WBC 5.11 03/17/2018    HGB 7.3 (L) 03/17/2018    HCT 22.1 (L) 03/17/2018    MCV 90 03/17/2018     03/17/2018       Recent Labs  Lab 03/12/18  0443   TSH 0.101*   FREET4 1.33     Lab Results   Component Value Date    HGBA1C 6.9 (H) 02/11/2018       Nutritional status:   Body mass index is 26.35 kg/m².  Lab Results   Component Value Date    ALBUMIN 2.5 (L) 03/17/2018    ALBUMIN 2.5 (L) 03/17/2018    ALBUMIN 2.5 (L) 03/17/2018     Lab Results   Component Value Date    PREALBUMIN 5 (L) 03/15/2018    PREALBUMIN 18 (L) 03/24/2015    PREALBUMIN 19 (L) 12/17/2014       Estimated Creatinine Clearance: 125.9 mL/min (based on SCr of 0.7 mg/dL).    Accu-Checks  Recent Labs      03/16/18   2111  03/16/18   2236  03/16/18   2342  03/17/18   0044  03/17/18   0217  03/17/18   0411  03/17/18   0524  03/17/18   0642  03/17/18   0742  03/17/18   0808   POCTGLUCOSE  119*  115*  124*  140*  137*  124*  135*  113*  128*  132*       Current Medications and/or Treatments Impacting Glycemic Control  Immunotherapy:  Immunosuppressants     None        Steroids:   Hormones     Start     Stop Route Frequency Ordered    03/15/18 1400  hydrocortisone sodium succinate injection 100 mg      -- IV Every 8 hours 03/15/18 1224        Pressors:    Autonomic Drugs     Start     Stop Route Frequency Ordered    03/14/18 1045  norepinephrine 16 mg in dextrose 5 % 250 mL infusion     Question Answer Comment   Titrate by: (in mcg/kg/min) 0.02    Titrate interval: (in minutes) 2     Titrate to maintain: (MAP or SBP) MAP    Greater than: (in mmHg) 60    Maximum dose: (in mcg/kg/min) 3        -- IV Continuous 03/14/18 0937        Hyperglycemia/Diabetes Medications: Antihyperglycemics     Start     Stop Route Frequency Ordered    03/14/18 1630  insulin regular (Humulin R) 100 Units in sodium chloride 0.9% 100 mL infusion     Question:  Insulin Rate Adjustment (DO NOT MODIFY ANSWER)  Answer:  [email or web address]    -- IV Continuous 03/14/18 1530          ASSESSMENT and PLAN    Type 1 diabetes mellitus with stage 3 chronic kidney disease      BG goal 140-180  Lab Results   Component Value Date    HGBA1C 6.9 (H) 02/11/2018     continue intensive insulin gtt at 0.1u/hr, insulin gtt cannot be discontinued as he is a type 1 diabetic  bg monitoring q2hr  Recheck c-peptide low,anti-insulin Ab pending    Discharge Recommendations:  TBD.        On enteral nutrition    May adversely impact bg          Hypothyroidism due to Hashimoto's thyroiditis    Abnormal TFTs upon admit.  Unclear if secondary to illness, but has been seen before in the past (during previous hospitalization).    Continue LT4 ng 125mcg daily.  Repeat TFTs in 4 weeks.          Heart transplanted    Per transplant  Avoid hypoglycemia              Ankur Keith MD  Endocrinology  [facility]

## 2018-03-17 NOTE — PROGRESS NOTES
[facility]  Heart Transplant  Progress Note    Patient Name: Lv Crocker  MRN: [id number]  Admission Date: 3/11/2018  Hospital Length of Stay: 5 days  Attending Physician: Jose A Lloyd MD  Primary Care Provider: Philippe Mohr MD  Principal Problem:Acute respiratory failure with hypoxia    Subjective:     Interval History: Intubated and sedated. Agitated with hypotension during sedation breaks. Tube feeds at 20 cc/hr with residuals ~ 200 cc. No bowel sounds appreciated, no bowel movements    Continuous Infusions:   sodium chloride 0.9% 200 mL/hr at 03/16/18 1158    dexmedetomidine (PRECEDEX) infusion 1 mcg/kg/hr (03/17/18 0500)    fentanyl 10 mL/hr at 03/17/18 0500    insulin (HUMAN R) infusion (adults) 0.1 Units/hr (03/17/18 0500)    midazolam (VERSED) infusion (titrating) 10 mg/hr (03/16/18 2200)    norepinephrine bitartrate-D5W 0.16 mcg/kg/min (03/17/18 0500)     Scheduled Meds:   azithromycin (ZITHROMAX) 500 mg IVPB  500 mg Intravenous Q24H    chlorhexidine  15 mL Mouth/Throat BID    docusate  100 mg Per NG tube BID    escitalopram oxalate  20 mg Per NG tube Daily    famotidine (PF)  20 mg Intravenous Daily    ganciclovir (CYTOVENE) IVPB  200 mg Intravenous Q24H    heparin (porcine)  5,000 Units Subcutaneous Q8H    hydrocortisone sodium succinate  100 mg Intravenous Q8H    levothyroxine  125 mcg Per NG tube Before breakfast    piperacillin-tazobactam (ZOSYN) IVPB  4.5 g Intravenous Q8H    polyethylene glycol  17 g Oral Daily    posaconazole (NOXAFIL) 300 mg in dextrose 5% IVPB  300 mg Intravenous Daily    ribavirin  600 mg Oral BID    sodium chloride 0.9%  3 mL Intravenous Q8H    vancomycin (VANCOCIN) IVPB  1,000 mg Intravenous Q24H     PRN Meds:acetaminophen, dextrose 50%, dextrose 50%, glucagon (human recombinant), glucose, glucose, k phos di & mono-sod phos mono    Review of patient's allergies indicates:   Allergen Reactions    No known drug allergies       Objective:     Vital Signs (Most Recent):  Temp: 97.8 °F (36.6 °C) (03/17/18 0300)  Pulse: 96 (03/17/18 0500)  Resp: (!) 24 (03/17/18 0500)  BP: (!) 142/73 (03/15/18 0731)  SpO2: 100 % (03/17/18 0500) Vital Signs (24h Range):  Temp:  [96.9 °F (36.1 °C)-97.8 °F (36.6 °C)] 97.8 °F (36.6 °C)  Pulse:  [] 96  Resp:  [0-24] 24  SpO2:  [98 %-100 %] 100 %  Arterial Line BP: (100-144)/(41-58) 110/50     Patient Vitals for the past 72 hrs (Last 3 readings):   Weight   03/16/18 0626 81 kg (178 lb 9.2 oz)   03/15/18 1000 81 kg (178 lb 9.2 oz)     Body mass index is 27.97 kg/m².      Intake/Output Summary (Last 24 hours) at 03/17/18 0521  Last data filed at 03/17/18 0500   Gross per 24 hour   Intake          7116.92 ml   Output             8526 ml   Net         -1409.08 ml       Hemodynamic Parameters:       Telemetry: SR-ST    Physical Exam   Constitutional: He appears well-developed and well-nourished.   Appears ill   HENT:   Head: Normocephalic and atraumatic.   Eyes: Conjunctivae are normal. Pupils are equal, round, and reactive to light.   Neck: No thyromegaly present.   RIJ trialysis   Cardiovascular: Regular rhythm.    Tachycardic   Pulmonary/Chest:   Intubated and ventilated  Breath sounds are slightly decreased right base. Essentially CTA bilaterally   Abdominal: Soft.   No bowel sounds. + ascites   Musculoskeletal:   1-2+ BUE edema; no MITZY; no cyanosis   Neurological:   Arouses and becomes agitated with sedation breaks   Skin: Skin is warm and dry. Capillary refill takes less than 2 seconds. There is pallor.       Significant Labs:  CBC:    Recent Labs  Lab 03/15/18  0406 03/16/18  0328 03/17/18  0413   WBC 6.87 5.16 5.11   RBC 2.85* 2.66* 2.47*   HGB 8.5* 7.8* 7.3*   HCT 24.9* 24.7* 22.1*    211 215   MCV 87 93 90   MCH 29.8 29.3 29.6   MCHC 34.1 31.6* 33.0     BNP:    Recent Labs  Lab 03/11/18  1922 03/13/18  0815   * 359*     CMP:    Recent Labs  Lab 03/15/18  0406  03/16/18  0328   03/16/18  1352  03/16/18  2237 03/17/18  0032 03/17/18  0414   *  136*  136*  < > 146*  146*  146*  < > 167*  167*  < > 131* 138* 133*  133*  133*  133*   CALCIUM 8.4*  8.4*  8.4*  < > 9.0  9.0  9.0  < > 9.5  9.5  < > 9.5 9.6 9.7  9.7  9.7  9.7   ALBUMIN 2.5*  2.5*  < > 2.4*  2.4*  --  2.4*  2.4*  --  2.5*  --  2.5*  2.5*  2.5*   PROT 5.9*  --  7.0  --   --   --   --   --  6.8     142  142  < > 142  142  142  < > 143  143  < > 143 142 145  145  145  145   K 4.2  4.2  4.2  4.2  < > 5.0  5.0  5.0  5.0  < > 4.4  4.4  < > 3.6 3.7  3.7 4.1  4.1  4.1  4.1  4.1  4.1  4.1   CO2 25  25  25  < > 20*  20*  20*  < > 20*  20*  < > 23 20* 24  24  24  24     104  104  < > 106  106  106  < > 106  106  < > 103 103 102  102  102  102   BUN 13  13  13  < > 4*  4*  4*  < > 5*  5*  < > 5* 5* 6  6  6  6   CREATININE 1.3  1.3  1.3  < > 0.8  0.8  0.8  < > 0.7  0.7  < > 0.8 0.7 0.7  0.7  0.7  0.7   ALKPHOS 209*  --  195*  --   --   --   --   --  200*   ALT 9*  --  13  --   --   --   --   --  13   AST 35  --  45*  --   --   --   --   --  51*   BILITOT 1.4*  --  1.7*  --   --   --   --   --  2.1*   < > = values in this interval not displayed.   Coagulation:   No results for input(s): PT, INR, APTT in the last 168 hours.  LDH:  No results for input(s): LDH in the last 72 hours.  Microbiology:  Microbiology Results (last 7 days)     Procedure Component Value Units Date/Time    Blood culture [[id number]] Collected:  03/13/18 1637    Order Status:  Completed Specimen:  Blood from Peripheral, Antecubital, Left Updated:  03/16/18 2012     Blood Culture, Routine No Growth to date     Blood Culture, Routine No Growth to date     Blood Culture, Routine No Growth to date     Blood Culture, Routine No Growth to date    Blood culture [[id number]] Collected:  03/13/18 1701    Order Status:  Completed Specimen:  Blood from Peripheral, Forearm, Left Updated:   03/16/18 2012     Blood Culture, Routine No Growth to date     Blood Culture, Routine No Growth to date     Blood Culture, Routine No Growth to date     Blood Culture, Routine No Growth to date    Culture, Respiratory with Gram Stain [[id number]] Collected:  03/14/18 1137    Order Status:  Completed Specimen:  Respiratory from BAL, TRIPP Updated:  03/16/18 1046     Respiratory Culture No growth     Gram Stain (Respiratory) No WBC's     Gram Stain (Respiratory) No organisms seen    Culture, Respiratory with Gram Stain [[id number]] Collected:  03/15/18 1541    Order Status:  Completed Specimen:  Respiratory from Sputum Updated:  03/16/18 0951     Respiratory Culture No Growth     Gram Stain (Respiratory) <10 epithelial cells per low power field.     Gram Stain (Respiratory) Rare WBC's      Gram Stain (Respiratory) No organisms seen    AFB Culture & Smear [[id number]] Collected:  03/14/18 1137    Order Status:  Completed Specimen:  Bronchial Wash from Amniotic Fluid Updated:  03/15/18 2127     AFB Culture & Smear Culture in progress     AFB CULTURE STAIN No acid fast bacilli seen.    Respiratory Viral Panel by PCR [facility]; Nasal Swab [[id number]]  (Abnormal) Collected:  03/13/18 1733    Order Status:  Completed Specimen:  Respiratory Updated:  03/15/18 1302     Respiratory Virus Panel, source Nasal Swab     RVP - Adenovirus Not Detected     Comment: Detects Serotypes B and E. Detection of Serotype C may   be limited. If Adenovirus infection is suspected and a   Not Detected result is returned the sample should be   re-tested for Adenovirus using an independent method  (e.g. [employer] Adenovirus Quantitative Real-Time  PCR test.          Enterovirus Not Detected     Comment: Cross-reactivity has been observed between certain Rhinovirus  strains and the Enterovirus assay.          Human Bocavirus Not Detected     Human Coronavirus Not Detected     Comment: The Human Coronavirus assay detects Human coronavirus  types  229E, OC43,NL63 and HKU1.          RVP - Human Metapneumovirus (hMPV) Not Detected     RVP - Influenza A Not Detected     Influenza A - S4O7-73 Not Detected     RVP - Influenza B Not Detected     Parainfluenza Not Detected     Respiratory Syncytial VirusVirus (RSV) A Positive (A)     Comment: The Respiratory Syncytial Viral assay detects types A and B,  however it does not distinguish between the two.          RVP - Rhinovirus Not Detected     Comment: Cross-Reactivity has been observed between certain   Rhinovirus strains and the Enterovirus assay.  Target Enriched Mulitplex Polymerase Chain Reaction (TEM-PCR)  allows for the detection of multiple pathogens out of a single  reaction.  This test was developed and its performance   characteristics determined by [employer].  It has not   been cleared or approved by the U.S.Food and Drug Administration.  Results should be used in conjunction with clinical findings,   and should not form the sole basis for a diagnosis or treatment  decision.  TEM-PCR is a licensed technology of [employer].         Narrative:       Receiving Lab:->[facility]    Culture, Respiratory with Gram Stain [[id number]] Collected:  03/13/18 1441    Order Status:  Completed Specimen:  Respiratory from Sputum, Expectorated Updated:  03/15/18 1237     Respiratory Culture Normal respiratory hank     Gram Stain (Respiratory) <10 epithelial cells per low power field.     Gram Stain (Respiratory) Rare WBC's     Gram Stain (Respiratory) Moderate Gram positive cocci     Gram Stain (Respiratory) Many Gram negative rods    Fungus culture [[id number]] Collected:  03/14/18 1137    Order Status:  Completed Specimen:  Bronchial Wash from Amniotic Fluid Updated:  03/15/18 1124     Fungus (Mycology) Culture Culture in progress    Stool culture **CANNOT BE ORDERED STAT** [[id number]] Collected:  03/11/18 1923    Order Status:  Completed Specimen:  Stool from Stool Updated:  03/14/18 1089      Stool Culture No enteric hank     Stool Culture No Salmonella,Shigella,Vibrio,Campylobacter,Yersinia isolated.    Culture, Fluid  (Aerobic) [[id number]] Collected:  03/14/18 1137    Order Status:  Canceled Specimen:  Bronchial Wash from Amniotic Fluid Updated:  03/14/18 1416    Cryptococcal antigen [[id number]] Collected:  03/14/18 0425    Order Status:  Completed Specimen:  Blood from Blood Updated:  03/14/18 1217     Cryptococcal Ag, Blood Negative    Culture, Respiratory with Gram Stain [[id number]] Collected:  03/13/18 1701    Order Status:  Completed Specimen:  Respiratory from Sputum, Expectorated Updated:  03/13/18 2224     Respiratory Culture Specimen inadequate - culture not performed     Gram Stain (Respiratory) >10epis/lfp and <than many WBC's     Gram Stain (Respiratory) Predominance of oropharyngeal hank. Please recollect.    Clostridium difficile EIA [[id number]] Collected:  03/11/18 1923    Order Status:  Completed Specimen:  Stool from Stool Updated:  03/11/18 2355     C. diff Antigen Negative     C difficile Toxins A+B, EIA Negative     Comment: Testing not recommended for children <24 months old.       Stool culture [[id number]]     Order Status:  Completed Specimen:  Stool from Stool     Stool culture [[id number]]     Order Status:  Canceled Specimen:  Stool from Stool     E. coli 0157 antigen [[id number]] Collected:  03/11/18 1923    Order Status:  Canceled Specimen:  Stool from Stool Updated:  03/11/18 2001          I have reviewed all pertinent labs within the past 24 hours.    Estimated Creatinine Clearance: 137.3 mL/min (based on SCr of 0.7 mg/dL).    Diagnostic Results:  I have reviewed all pertinent imaging results/findings within the past 24 hours.    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 Most  recently has had ongoing issues with his lungs, had gotten 2 thoracenteses, after which he underwent VATS 01/19/18 followed by pleurex catheter placement and effusion drainage 01/11/18, subsequently had it removed 02/07/18 after drainage had decreased despite multiple attempts to drain it. Has been having significant coughing fits that result in him being near-syncopal at times, although difficult to say if he has passed out or not- patient most recently was admitted to the hospital for increased AGUILAR, coughing and pre-syncope 02/11-02/14/18 [facility].   Patient was started on antibiotics for suspected bacterial infection, with some diarrhea, bronchitis, and possible pneumonia. Ct chest showed some pleural fluid collection and after talking with Dr. James, we arranged for him to receive a diagnostic tap with IR, from which the fluid collection demonstrated no growth or significant findings at all really. Cell counts do not appear to have been sent but will recheck. Patient states since his hospital stay, yesterday had a significant coughing fit again, after which he nearly passed out, is being seen in clinic today for this. Denies any cardiopulmonary complaints, no leg swelling, has some abdominal swelling, which is moderate for him. Denies significant shortness of breath with mild exertion, had 6MWT yesterday to see if he qualified for home O2, and his O2 sats actually improved after recovery from where he started initially. He and his wife admit he is in bed most days, not very active.     Mr. Crocker presented to the ED 3/11/18 with approximately 3 weeks of worsening diarrhea and 2-3 days of sinus pressure, drainage, and worsened cough.  He notes that he had a coughing fit last night and passed out, coughed throughout most of the night.  This also happened on 2/25/18.  He last saw Dr Ferreira in clinic on 2/20/18 where he was taken off myfortic and switched to cellcept (he hadn't been on cellcept because of  leukopenia when they tried post-transplant).  Though his diarrhea had improved after his last discharge, he states it has increased now to 15x/day, clear/yellow/green, no fevers, no exacerbating foods per say.  He was taken back off the cellcept and placed back on myfortic this past week and has not noticed immediate change in diarrhea. He also reports his baseline coughing fits havent improved and are minimally responsive to tessalon pearls.  Came on last night, he lost consciousness during a fit once which is relatively normal for him, and had two more during HPI.  He notes no sick contacts but does state he's had some URI symptoms as above.  His baseline vitals are usually -120 and BP 90s/60s-110s/70s.  He reports a history of intermittent diarrhea - did have Cdiff many years ago but this smells different.  No abdominal pain, no nausea, no vomiting.  No blood in diarrhea.  Appears last c-scope in 2015 with no evidence of infection or inflammatory processes.  He does report IBS which is different that this.       * Acute respiratory failure with hypoxia    -S/P Bronch and intubation 3/14. Awaiting cultures.   -Appreciate PUL/ID Cx. RCV positive. Started Ribavarin/IVIG/stress dose steroids per lung transplant protocol for severe RSV pneumonia   -Continue empiric vanc/zosyn/azithromycin for broad bacterial coverage and posaconazole for nodular appearing lesions in lungs   -Started induction GCV until invasive CMV is excluded.  -Aspergillus antigen, fungitell, urine histo/blasto antigens, crypto antigen, RVP, urine legionella, and Quantiferon pending.  -Pulmonology managing vent        Diarrhea    - No more diarrhea.  - C. Diff -ve. Other stool studies pending  - CMV DNA and Norovirus PCR -ve  - Strongyloides ab, adenovirus EIA, and Hay stool PCR panel pending  - Consulted GI - plan upper and lower endoscopy once more stable.         Cough    - See resp failure above.        Restrictive cardiomyopathy    -S/P  thoracentesis X 2, followed by VATS/pleurex cath placement (January 2018) with removal of pleurex (February 2018) and 3rd thoracentesis 2/14/18 with no significant findings on fluid removal. Moderate right pleural effusion stable by CXR 3/11/18 and chest CT 3/13/18  -Last paracentesis was in January. Abd US 3/12/18 confirmed ongoing ascites (not tense at all). Will consider getting paracentesis this admit  -Continue Levophed for MAP 60.         Heart transplanted    - TTE 3/12/18 showed LVEF 50-55% (but no substantial change when compared to previous echo), RV normal and IVC 8  - Had -ve DSE on 11/30/17  - Current immunosuppression: Pred/Prograf/MMF on hold. Continue hydrocortisone 100mg Q8H.         Leukopenia    - WBC count has normalized. May require neupogen if trends down with current treatment.        Acute renal failure superimposed on stage 4 chronic kidney disease    - CVP 12 this am.  - Appreciate Neph Cx. Continue CRRT as tolerated.         Type 1 diabetes mellitus with stage 3 chronic kidney disease    - Appreciate Endocrine's recs        Hypothyroidism due to Hashimoto's thyroiditis    - TSH low at 0.101 with normal FT4. Appreciate Endocrine's recs        Septic shock    -Continue Levo for MAP 60.  -Added hydrocortisone 100mg TID.  -IV Abx as above.   -Tube [place] [id number]  Heart Transplant  [facility]

## 2018-03-17 NOTE — PROGRESS NOTES
Pt gastric residuals 685 cc. NETTIE Louise notified. Instructed to hold tube feedings and ok continue OG medications. See flow sheet for full documentation.

## 2018-03-17 NOTE — SUBJECTIVE & OBJECTIVE
Interval History:   Patient evaluated at bedside with family member present, continue on mechanical ventilator, no distress, still on pressors for control of blood pressures, Total intake 7.1 L and output 8.5 L. For NET negative 1.3 L.     Review of patient's allergies indicates:   Allergen Reactions    No known drug allergies      Current Facility-Administered Medications   Medication Frequency    0.9%  NaCl infusion (CRRT USE ONLY) Continuous    acetaminophen tablet 325 mg Q4H PRN    azithromycin (ZITHROMAX) 500 mg in sodium chloride 0.9% 250 mL IVPB Q24H    chlorhexidine 0.12 % solution 15 mL BID    dexmedetomidine (PRECEDEX) 400mcg/100mL 0.9% NaCL infusion Continuous    dextrose 50% injection 12.5 g PRN    dextrose 50% injection 25 g PRN    docusate 50 mg/5 mL liquid 100 mg BID    escitalopram oxalate tablet 20 mg Daily    famotidine (PF) injection 20 mg Daily    fentaNYL 2500 mcg in 0.9% sodium chloride 250 mL infusion premix (titrating) Continuous    ganciclovir (CYTOVENE) 200 mg in sodium chloride 0.9% 100 mL IVPB Q24H    glucagon (human recombinant) injection 1 mg PRN    glucose chewable tablet 16 g PRN    glucose chewable tablet 24 g PRN    heparin (porcine) injection 5,000 Units Q8H    hydrocortisone sodium succinate injection 100 mg Q8H    insulin regular (Humulin R) 100 Units in sodium chloride 0.9% 100 mL infusion Continuous    k phos di & mono-sod phos mono 250 mg tablet 2 tablet Q4H PRN    levothyroxine tablet 125 mcg Before breakfast    midazolam (VERSED) 1 mg/mL in sodium chloride 0.9% 100 mL infusion (titrating) Continuous    norepinephrine 16 mg in dextrose 5 % 250 mL infusion Continuous    piperacillin-tazobactam 4.5 g in sodium chloride 0.9% 100 mL IVPB (ready to mix system) Q8H    polyethylene glycol packet 17 g Daily    posaconazole 300 mg in dextrose 5 % 150 mL infusion Daily    ribavirin capsule 600 mg BID    sodium chloride 0.9% flush 3 mL Q8H    vancomycin 1  gram/250 mL in sodium chloride 0.9% IVPB 1 g Q24H       Objective:     Vital Signs (Most Recent):  Temp: (!) 94.6 °F (34.8 °C) (03/17/18 1100)  Pulse: 96 (03/17/18 1100)  Resp: (!) 0 (03/17/18 0740)  BP: (!) 142/73 (03/15/18 0731)  SpO2: 96 % (03/17/18 1100)  O2 Device (Oxygen Therapy): ventilator (03/17/18 0740) Vital Signs (24h Range):  Temp:  [94.3 °F (34.6 °C)-97.8 °F (36.6 °C)] 94.6 °F (34.8 °C)  Pulse:  [] 96  Resp:  [0-31] 0  SpO2:  [95 %-100 %] 96 %  Arterial Line BP: ()/(40-58) 102/44     Weight: 76.3 kg (168 lb 3.4 oz) (03/17/18 0630)  Body mass index is 26.35 kg/m².  Body surface area is 1.9 meters squared.    I/O last 3 completed shifts:  In: 56699.1 [I.V.:9153.1; NG/GT:690; IV Piggyback:1650]  Out: 27688 [Urine:15; Other:55475]    Physical Exam   Constitutional: He appears well-developed and well-nourished. He is sedated and intubated.   HENT:   Head: Normocephalic and atraumatic.   Neck: Neck supple. No thyromegaly present.   Cardiovascular: Normal rate and regular rhythm.    Pulmonary/Chest: He is intubated. He has no wheezes. He has no rales.   Abdominal: Soft. He exhibits no distension.   Musculoskeletal: He exhibits no deformity. Edema: no pitting edema.   Skin: Skin is warm and dry. He is not diaphoretic.       Significant Labs:  ABGs:   Recent Labs  Lab 03/17/18  0520   PH 7.459*   PCO2 37.3   HCO3 26.5   POCSATURATED 79*   BE 3     BMP:   Recent Labs  Lab 03/17/18  0414 03/17/18  0806   *  133*  133*  133* 125*     102  102  102 102   CO2 24  24  24  24 24   BUN 6  6  6  6 6   CREATININE 0.7  0.7  0.7  0.7 0.7   CALCIUM 9.7  9.7  9.7  9.7 9.1   MG 2.1  --      CBC:   Recent Labs  Lab 03/17/18  0413   WBC 5.11   RBC 2.47*   HGB 7.3*   HCT 22.1*      MCV 90   MCH 29.6   MCHC 33.0     CMP:   Recent Labs  Lab 03/17/18  0414 03/17/18  0806   *  133*  133*  133* 125*   CALCIUM 9.7  9.7  9.7  9.7 9.1   ALBUMIN 2.5*  2.5*  2.5*  --    PROT  6.8  --      145  145  145 143   K 4.1  4.1  4.1  4.1  4.1  4.1  4.1 4.1  4.1   CO2 24  24  24  24 24     102  102  102 102   BUN 6  6  6  6 6   CREATININE 0.7  0.7  0.7  0.7 0.7   ALKPHOS 200*  --    ALT 13  --    AST 51*  --    BILITOT 2.1*  --      All labs within the past 24 hours have been reviewed.

## 2018-03-17 NOTE — ASSESSMENT & PLAN NOTE
-S/P Bronch and intubation 3/14. Awaiting cultures.   -Appreciate PUL/ID Cx. RCV positive. Started Ribavarin/IVIG/stress dose steroids per lung transplant protocol for severe RSV pneumonia   -Continue empiric vanc/zosyn/azithromycin for broad bacterial coverage and posaconazole for nodular appearing lesions in lungs   -Started induction GCV until invasive CMV is excluded.  -Aspergillus antigen, fungitell, urine histo/blasto antigens, crypto antigen, RVP, urine legionella, and Quantiferon pending.  -Pulmonology managing vent

## 2018-03-17 NOTE — PROGRESS NOTES
[facility]  Pulmonology  Progress Note    Patient Name: Lv Crocker  MRN: [id number]  Admission Date: 3/11/2018  Hospital Length of Stay: 5 days  Code Status: Full Code  Attending Provider: Jose A Lloyd MD  Primary Care Provider: Philippe Mohr MD   Principal Problem: Acute respiratory failure with hypoxia    Subjective:     Interval History: Patient is sedated and on the vent. Will have episodes of high peak pressures in the 60's - these episodes seem to be related to agitation - he seems to bear down (intermittent abdominal clenching noted). Otherwise, no SBT attempted today - continues to be sedated. Fentanyl weaned down.    Objective:     Vital Signs (Most Recent):  Temp: 97.5 °F (36.4 °C) (03/17/18 1530)  Pulse: (!) 116 (03/17/18 1545)  Resp: (!) 0 (03/17/18 0740)  BP: (!) 107/51 (03/17/18 1330)  SpO2: 98 % (03/17/18 1545) Vital Signs (24h Range):  Temp:  [94.3 °F (34.6 °C)-97.8 °F (36.6 °C)] 97.5 °F (36.4 °C)  Pulse:  [] 116  Resp:  [0-31] 0  SpO2:  [93 %-100 %] 98 %  BP: (107)/(51) 107/51  Arterial Line BP: ()/(37-58) 111/50     Weight: 76.3 kg (168 lb 3.4 oz)  Body mass index is 26.35 kg/m².    Intake/Output Summary (Last 24 hours) at 03/17/18 1608  Last data filed at 03/17/18 1600   Gross per 24 hour   Intake          7053.44 ml   Output             7082 ml   Net           -28.56 ml     Physical Exam   Constitutional: He is oriented to person, place, and time. He appears well-developed and well-nourished.   HENT:   Head: Normocephalic and atraumatic.   Eyes: Conjunctivae are normal.   Cardiovascular: Normal rate, regular rhythm and normal heart sounds.    Pulmonary/Chest: Effort normal. He has no wheezes. He has no rales.   Intubated, bronchial breath sounds on the R, clear on the L   Abdominal: Soft. Bowel sounds are normal. He exhibits no distension. There is no tenderness.   Musculoskeletal: He exhibits no edema.   Neurological: He is alert and oriented to  person, place, and time.   Skin: Skin is warm and dry.   Nursing note and vitals reviewed.    Vents:  Vent Mode: A/C (03/17/18 1511)  Ventilator Initiated: Yes (03/14/18 0932)  Set Rate: 21 bmp (03/17/18 1511)  Vt Set: 450 mL (03/17/18 1511)  Pressure Support: 0 cmH20 (03/17/18 1511)  PEEP/CPAP: 5 cmH20 (03/17/18 1511)  Oxygen Concentration (%): 40 (03/17/18 1545)  Peak Airway Pressure: 38 cmH2O (03/17/18 1511)  Plateau Pressure: 36 cmH20 (03/17/18 1511)  Total Ve: 9.93 mL (03/17/18 1511)  F/VT Ratio<105 (RSBI): (!) 0 (03/17/18 0740)    Lines/Drains/Airways     Central Venous Catheter Line                 Trialysis (Dialysis) Catheter 03/14/18 0813 right internal jugular 3 days          Drain                 NG/OG Tube 03/14/18 1300 [place] [facility] mouth 3 days         Urethral Catheter 03/14/18 1125 Latex 3 days          Airway                 Airway - Non-Surgical 03/14/18 0930 Endotracheal Tube 3 days          Arterial Line                 Arterial Line 03/14/18 1600 Right Femoral 3 days          Peripheral Intravenous Line                 Peripheral IV - Single Lumen 03/13/18 1600 Left Antecubital 4 days         Peripheral IV - Single Lumen 03/14/18 1736 Right Forearm 2 days         Midline Catheter Insertion/Assessment  - Single Lumen 03/17/18 1454 Left cephalic vein (lateral side of arm) 18g x 8cm less than 1 day              Significant Labs:    CBC/Anemia Profile:    Recent Labs  Lab 03/16/18  0328 03/17/18  0413   WBC 5.16 5.11   HGB 7.8* 7.3*   HCT 24.7* 22.1*    215   MCV 93 90   RDW 13.6 13.9     Chemistries:    Recent Labs  Lab 03/16/18  0328  03/16/18  2237 03/17/18  0032 03/17/18  0414 03/17/18  0806 03/17/18  1351     142  142  < > 143 142 145  145  145  145 143 144   K 5.0  5.0  5.0  5.0  < > 3.6 3.7  3.7 4.1  4.1  4.1  4.1  4.1  4.1  4.1 4.1  4.1 4.4     106  106  < > 103 103 102  102  102  102 102 104   CO2 20*  20*  20*  < > 23 20* 24  24  24  24 24 21*    BUN 4*  4*  4*  < > 5* 5* 6  6  6  6 6 6   CREATININE 0.8  0.8  0.8  < > 0.8 0.7 0.7  0.7  0.7  0.7 0.7 0.7   CALCIUM 9.0  9.0  9.0  < > 9.5 9.6 9.7  9.7  9.7  9.7 9.1 8.9   ALBUMIN 2.4*  2.4*  < > 2.5*  --  2.5*  2.5*  2.5*  --  2.4*   PROT 7.0  --   --   --  6.8  --   --    BILITOT 1.7*  --   --   --  2.1*  --   --    ALKPHOS 195*  --   --   --  200*  --   --    ALT 13  --   --   --  13  --   --    AST 45*  --   --   --  51*  --   --    MG 2.2  < >  --  2.3  2.3 2.1  --  1.7   PHOS 2.7  < > 1.7*  --  2.4*  2.4*  --  2.7   < > = values in this interval not displayed.    All pertinent labs within the past 24 hours have been reviewed.    Significant Imaging:  I have reviewed and interpreted all pertinent imaging results/findings within the past 24 hours.    Assessment/Plan:     * Acute respiratory failure with hypoxia    This is a case of hypoxic/hypercapnic respiratory failure in the setting of heart transplant (11/2014), restrictive cardiomyopathy, low compliance of the R lung, and (+) RSV. Patient was admitted on 3/11 and intubated on 3/14. CXR and CT chest from 3/14 show significant pulmonary edema - oxygenation has improved since that time with aggressive volume removal on CRRT. With patient's history of nausea/vomiting, there was also concern for aspiration PNA.     Today's CXR shows marked improvement with patient's vent settings at PEEP 5 and FiO2 of 40%. Patient is sedated on Fentanyl 50 mcg and Precedex at this time. He has been off of Versed drip for 24 hours. Peak pressures are elevated with plateau pressures around 30. He is currently on Vanc, Zosyn, and Azithromycin.    -Bronch culture data pending. (+) RSV. Agree with continuing ABX at this time while waiting for culture data.  -Continue volume removal with CRRT for (-) net. CXR in the a.m.  -Continue Fentanyl and Precedex for sedation. Do not start benzodiazepine.  -Patient continues to have high peak/plateau pressures.  Considering whether this is related to low compliance of his R lung. Will continue to monitor. He does have episodes of abdominal contraction and bearing down that causes his peak pressures to rise into the 50's-60's with agitation. Did not attempt SBT for this reason.  -Will re-evaluate tomorrow with likely SBT.          Thank you for involving us in the care of this patient. We will continue to follow along. Please call with any questions.    Abbie Coleman MD  [employer] P[facility] Fellow, PGY 4  [facility]  Pager: [phone]

## 2018-03-17 NOTE — ASSESSMENT & PLAN NOTE
BG goal 140-180  Lab Results   Component Value Date    HGBA1C 6.9 (H) 02/11/2018     continue intensive insulin gtt at 0.1u/hr, insulin gtt cannot be discontinued as he is a type 1 diabetic  bg monitoring q2hr  Recheck c-peptide low,anti-insulin Ab pending    Discharge Recommendations:  KHADIJAH.

## 2018-03-17 NOTE — SUBJECTIVE & OBJECTIVE
"Interval HPI:   Overnight events: minimal insulin requirements. Primary team may extubate today  Eating:   NPO  Nausea: No  Hypoglycemia and intervention: No  Fever: No  TPN and/or TF: Yes  If yes, type of TF/TPN and rate: 20cc/hr    BP (!) 142/73   Pulse 96   Temp 96 °F (35.6 °C) (Axillary)   Resp (!) 0   Ht 5' 7" (1.702 m)   Wt 76.3 kg (168 lb 3.4 oz)   SpO2 96%   BMI 26.35 kg/m²     Labs Reviewed and Include      Recent Labs  Lab 03/17/18  0414 03/17/18  0806   *  133*  133*  133* 125*   CALCIUM 9.7  9.7  9.7  9.7 9.1   ALBUMIN 2.5*  2.5*  2.5*  --    PROT 6.8  --      145  145  145 143   K 4.1  4.1  4.1  4.1  4.1  4.1  4.1 4.1  4.1   CO2 24  24  24  24 24     102  102  102 102   BUN 6  6  6  6 6   CREATININE 0.7  0.7  0.7  0.7 0.7   ALKPHOS 200*  --    ALT 13  --    AST 51*  --    BILITOT 2.1*  --      Lab Results   Component Value Date    WBC 5.11 03/17/2018    HGB 7.3 (L) 03/17/2018    HCT 22.1 (L) 03/17/2018    MCV 90 03/17/2018     03/17/2018       Recent Labs  Lab 03/12/18  0443   TSH 0.101*   FREET4 1.33     Lab Results   Component Value Date    HGBA1C 6.9 (H) 02/11/2018       Nutritional status:   Body mass index is 26.35 kg/m².  Lab Results   Component Value Date    ALBUMIN 2.5 (L) 03/17/2018    ALBUMIN 2.5 (L) 03/17/2018    ALBUMIN 2.5 (L) 03/17/2018     Lab Results   Component Value Date    PREALBUMIN 5 (L) 03/15/2018    PREALBUMIN 18 (L) 03/24/2015    PREALBUMIN 19 (L) 12/17/2014       Estimated Creatinine Clearance: 125.9 mL/min (based on SCr of 0.7 mg/dL).    Accu-Checks  Recent Labs      03/16/18   2111 03/16/18   2236  03/16/18   2342  03/17/18   0044  03/17/18   0217  03/17/18   0411  03/17/18   0524  03/17/18   0642  03/17/18   0742  03/17/18   0808   POCTGLUCOSE  119*  115*  124*  140*  137*  124*  135*  113*  128*  132*       Current Medications and/or Treatments Impacting Glycemic Control  Immunotherapy:  Immunosuppressants     " None        Steroids:   Hormones     Start     Stop Route Frequency Ordered    03/15/18 1400  hydrocortisone sodium succinate injection 100 mg      -- IV Every 8 hours 03/15/18 1224        Pressors:    Autonomic Drugs     Start     Stop Route Frequency Ordered    03/14/18 1045  norepinephrine 16 mg in dextrose 5 % 250 mL infusion     Question Answer Comment   Titrate by: (in mcg/kg/min) 0.02    Titrate interval: (in minutes) 2    Titrate to maintain: (MAP or SBP) MAP    Greater than: (in mmHg) 60    Maximum dose: (in mcg/kg/min) 3        -- IV Continuous 03/14/18 0937        Hyperglycemia/Diabetes Medications: Antihyperglycemics     Start     Stop Route Frequency Ordered    03/14/18 1630  insulin regular (Humulin R) 100 Units in sodium chloride 0.9% 100 mL infusion     Question:  Insulin Rate Adjustment (DO NOT MODIFY ANSWER)  Answer:  [email or web address] [id number]    -- IV Continuous 03/14/18 1535

## 2018-03-18 PROBLEM — R19.7 DIARRHEA: Status: RESOLVED | Noted: 2018-01-01 | Resolved: 2018-01-01

## 2018-03-18 PROBLEM — R05.3 COUGH, PERSISTENT: Status: RESOLVED | Noted: 2018-01-01 | Resolved: 2018-01-01

## 2018-03-18 PROBLEM — K56.7 ILEUS: Status: ACTIVE | Noted: 2018-01-01

## 2018-03-18 NOTE — SUBJECTIVE & OBJECTIVE
Interval History:Stablet today but still intubated - no new issus     Review of Systems   Unable to perform ROS: Intubated   C    Objective:     Vital Signs (Most Recent):  Temp: 99 °F (37.2 °C) (03/17/18 2300)  Pulse: (!) 121 (03/17/18 2300)  Resp: (!) 0 (03/17/18 0740)  BP: (!) 108/53 (03/17/18 2300)  SpO2: 97 % (03/17/18 2300) Vital Signs (24h Range):  Temp:  [94.3 °F (34.6 °C)-99 °F (37.2 °C)] 99 °F (37.2 °C)  Pulse:  [] 121  Resp:  [0-31] 0  SpO2:  [93 %-100 %] 97 %  BP: (104-108)/(51-54) 108/53  Arterial Line BP: ()/(37-58) 119/44     Weight: 76.3 kg (168 lb 3.4 oz)  Body mass index is 26.35 kg/m².    Estimated Creatinine Clearance: 97.9 mL/min (based on SCr of 0.9 mg/dL).    Physical Exam   onstitutional: He appears well-developed and well-nourished. No distress.   Chronically ill appearing. Intubated.   HENT:   Head: Atraumatic.   Mouth/Throat: Oropharynx is clear and moist. No oropharyngeal exudate.   Eyes: Conjunctivae are normal. Pupils are equal, round, and reactive to light. No scleral icterus.   Neck: Neck supple.   Cardiovascular: Normal rate and regular rhythm.  Exam reveals no friction rub.    No murmur heard.  Pulmonary/Chest: No respiratory distress. He has no wheezes. He has rales. He exhibits no tenderness.   Intubated.   Abdominal: Soft. Bowel sounds are normal. He exhibits no distension. There is no tenderness. There is no rebound and no guarding.   Musculoskeletal: Normal range of motion. He exhibits no edema.   Lymphadenopathy:     He has no cervical adenopathy.   Neurological:   Intubated/sedated.   Skin: No rash noted. No erythema. IV lines OK     Significant Labs: All pertinent labs within the past 24 hours have been reviewed.    Significant Imaging: I have reviewed all pertinent imaging results/findings within the past 24 hours.

## 2018-03-18 NOTE — SUBJECTIVE & OBJECTIVE
"Interval HPI:   Overnight events: none  Eating:   NPO  Nausea: No  Hypoglycemia and intervention: No  Fever: No  TPN and/or TF: No  If yes, type of TF/TPN and rate: n/a    BP (!) 114/56 (BP Location: Right arm, Patient Position: Lying)   Pulse (!) 117   Temp 98.2 °F (36.8 °C) (Core Esophageal)   Resp (!) 23   Ht 5' 7" (1.702 m)   Wt 76.3 kg (168 lb 3.4 oz)   SpO2 (!) 94%   BMI 26.35 kg/m²     Labs Reviewed and Include      Recent Labs  Lab 03/18/18  0411   *  157*   CALCIUM 8.3*  8.3*   ALBUMIN 2.3*  2.3*   PROT 5.8*     144   K 5.0  5.0   CO2 21*  21*     103   BUN 8  8   CREATININE 0.8  0.8   ALKPHOS 187*   ALT 33   *   BILITOT 2.1*     Lab Results   Component Value Date    WBC 4.10 03/18/2018    HGB 7.2 (L) 03/18/2018    HCT 22.8 (L) 03/18/2018    MCV 90 03/18/2018    PLT 91 (L) 03/18/2018       Recent Labs  Lab 03/12/18  0443   TSH 0.101*   FREET4 1.33     Lab Results   Component Value Date    HGBA1C 6.9 (H) 02/11/2018       Nutritional status:   Body mass index is 26.35 kg/m².  Lab Results   Component Value Date    ALBUMIN 2.3 (L) 03/18/2018    ALBUMIN 2.3 (L) 03/18/2018    ALBUMIN 2.3 (L) 03/17/2018     Lab Results   Component Value Date    PREALBUMIN 5 (L) 03/15/2018    PREALBUMIN 18 (L) 03/24/2015    PREALBUMIN 19 (L) 12/17/2014       Estimated Creatinine Clearance: 110.2 mL/min (based on SCr of 0.8 mg/dL).    Accu-Checks  Recent Labs      03/17/18   1351  03/17/18   1546  03/17/18   1731  03/17/18   1953  03/17/18   2226  03/17/18   2350  03/18/18   0218  03/18/18   0417  03/18/18   0836  03/18/18   0952   POCTGLUCOSE  143*  150*  157*  145*  170*  145*  122*  159*  180*  214*       Current Medications and/or Treatments Impacting Glycemic Control  Immunotherapy:  Immunosuppressants     None        Steroids:   Hormones     Start     Stop Route Frequency Ordered    03/17/18 1530  vasopressin (PITRESSIN) 0.2 Units/mL in dextrose 5 % 100 mL infusion      -- IV " Continuous 03/17/18 1424    03/15/18 1400  hydrocortisone sodium succinate injection 100 mg      -- IV Every 8 hours 03/15/18 1224        Pressors:    Autonomic Drugs     Start     Stop Route Frequency Ordered    03/14/18 1045  norepinephrine 16 mg in dextrose 5 % 250 mL infusion     Question Answer Comment   Titrate by: (in mcg/kg/min) 0.02    Titrate interval: (in minutes) 2    Titrate to maintain: (MAP or SBP) MAP    Greater than: (in mmHg) 60    Maximum dose: (in mcg/kg/min) 3        -- IV Continuous 03/14/18 0937        Hyperglycemia/Diabetes Medications: Antihyperglycemics     Start     Stop Route Frequency Ordered    03/14/18 1630  insulin regular (Humulin R) 100 Units in sodium chloride 0.9% 100 mL infusion     Question:  Insulin Rate Adjustment (DO NOT MODIFY ANSWER)  Answer:  [email or web address] [id number]    -- IV Continuous 03/14/18 1535

## 2018-03-18 NOTE — SUBJECTIVE & OBJECTIVE
Interval History: Intubated and sedated  - more O2 needed today   Review of Systems   Unable to perform ROS: Intubated   nstitutional: He appears well-developed and well-nourished. No distress.   Chronically ill appearing. Intubated.   HENT:   Head: Atraumatic.   Mouth/Throat: Oropharynx is clear and moist. No oropharyngeal exudate.   Eyes: Conjunctivae are normal. Pupils are equal, round, and reactive to light. No scleral icterus.   Neck: Neck supple.   Cardiovascular: Normal rate and regular rhythm.  Exam reveals no friction rub.    No murmur heard.  Pulmonary/Chest: No respiratory distress. He has no wheezes. He has rales. He exhibits no tenderness.   Intubated.   Abdominal: Soft. Bowel sounds are normal. He exhibits no distension. There is no tenderness. There is no rebound and no guarding.   Musculoskeletal: Normal range of motion. He exhibits no edema.   Lymphadenopathy:     He has no cervical adenopathy.   Neurological:   Intubated/sedated.   Skin: No rash noted. No erythema. IV lines OK   Objective:     Vital Signs (Most Recent):  Temp: 97.7 °F (36.5 °C) (03/18/18 1300)  Pulse: (!) 120 (03/18/18 1333)  Resp: (!) 23 (03/18/18 0345)  BP: (!) 105/56 (03/18/18 1200)  SpO2: 97 % (03/18/18 1333) Vital Signs (24h Range):  Temp:  [96.6 °F (35.9 °C)-99.7 °F (37.6 °C)] 97.7 °F (36.5 °C)  Pulse:  [101-128] 120  Resp:  [22-24] 23  SpO2:  [94 %-100 %] 97 %  BP: (104-116)/(49-56) 105/56  Arterial Line BP: ()/(37-58) 104/49     Weight: 76.3 kg (168 lb 3.4 oz)  Body mass index is 26.35 kg/m².    Estimated Creatinine Clearance: 110.2 mL/min (based on SCr of 0.8 mg/dL).    Physical Exam    nstitutional: He appears well-developed and well-nourished. No distress.   Chronically ill appearing. Intubated.   HENT:   Head: Atraumatic.   Mouth/Throat: Oropharynx is clear and moist. No oropharyngeal exudate.   Eyes: Conjunctivae are normal. Pupils are equal, round, and reactive to light. No scleral icterus.   Neck: Neck supple.    Cardiovascular: Normal rate and regular rhythm.  Exam reveals no friction rub.    No murmur heard.  Pulmonary/Chest: No respiratory distress. He has no wheezes. He has rales. He exhibits no tenderness.   Intubated.   Abdominal: Soft. Bowel sounds are normal. He exhibits no distension. There is no tenderness. There is no rebound and no guarding.   Musculoskeletal: Normal range of motion. He exhibits no edema.   Lymphadenopathy:     He has no cervical adenopathy.   Neurological:   Intubated/sedated.   Skin: No rash noted. No erythema. IV lines OK     Significant Labs: All pertinent labs within the past 24 hours have been reviewed.    Significant Imaging: I have reviewed all pertinent imaging results/findings within the past 24 hours.

## 2018-03-18 NOTE — ASSESSMENT & PLAN NOTE
BG goal 140-180  Lab Results   Component Value Date    HGBA1C 6.9 (H) 02/11/2018     continue intensive insulin gtt at 0.5u/hr, insulin gtt cannot be discontinued as he is a type 1 diabetic  bg monitoring q2hr  Recheck c-peptide low,anti-insulin Ab pending    Discharge Recommendations:  KHADIJAH.

## 2018-03-18 NOTE — ASSESSMENT & PLAN NOTE
This is a case of hypoxic/hypercapnic respiratory failure in the setting of heart transplant (11/2014), restrictive cardiomyopathy, low compliance of the R lung, and (+) RSV. Patient was admitted on 3/11 and intubated on 3/14. CXR and CT chest from 3/14 show significant pulmonary edema - oxygenation has improved since that time with aggressive volume removal on CRRT. With patient's history of nausea/vomiting, there was also concern for aspiration PNA.     Today's CXR continues to show improvement. Patient's vent settings at PEEP 5 and FiO2 of 50%. Patient is sedated on Fentanyl 50 mcg and Precedex at this time. He has been off of Versed drip for 48 hours. Peak pressures are elevated with plateau pressures around 30. He is currently on Vanc, Zosyn, and Azithromycin.    -Bronch culture data pending. (+) RSV. Agree with continuing ABX at this time while waiting for culture data.  -Continue volume removal with CRRT for (-) net. CXR in the a.m.  -Continue Fentanyl and Precedex for sedation. Do not start benzodiazepines.  -Patient continues to have high peak/plateau pressures. Considering whether this is related to low compliance of his R lung. Will continue to monitor. He does have episodes of abdominal contraction and bearing down that causes his peak pressures to rise into the 50's-60's with agitation. Blood pressures and oxygenation drop when this happens.  -Patient's hgb dropped acutely requiring transfusions. Will wait to do SBT until acute issues resolve.

## 2018-03-18 NOTE — PROGRESS NOTES
[facility]  Nephrology  Progress Note    Patient Name: Lv Crocker  MRN: [id number]  Admission Date: 3/11/2018  Hospital Length of Stay: 6 days  Attending Provider: Jose A Lloyd MD   Primary Care Physician: Philippe Mohr MD  Principal Problem:Acute respiratory failure with hypoxia    Subjective:     HPI: Mr. Crocker is a 41 yo WM with T1DM, Hashimoto thyroiditis, h/o OHTx 11/2014, and CKD stage 4 who was admitted on 3/11/18 for persistent diarrhea. He reported a 3 week history of diarrhea up to 15x/day. Associated symptoms including URI symptoms, coughing fits, and coughing syncope. Prograf level was 14.3. His post-heart transplant course has been complicated by AMR 4/2015, CMV, post-transplant restrictive cardiomyopathy, recurrent pleural effusions/ascites requiring monthly thoracenteses/paracenteses, VATS 1/11/18 with Pleur-X catheter (now removed), and CKD stage 4 with baseline sCr 2.6-3.0. Baseline -120s and baseline BP 90s-110s/60-70s. Upon admission he was started on IVF and diarrhea workup was initiated. On 3/12 he was noted to have decreased UOP despite fluids; NS was increased to 100cc/hr. He was scheduled for a colonoscopy on 3/13 however procedure was cancelled due to hypoxia and fever. He was transferred to the ICU for closer monitoring. He continued to have oliguria overnight. Bladder scan revealed 200cc of urine but patient refused osullivan catheter placement. Wife reports that patient always has a hard time urinating again after osullivan catheters are placed/removed so he refuses them. He remained hypoxic despite FiO2 70% and ABG revealed combined respiratory and metabolic acidosis with pH 7.17. He was started on BiPAP as well as a bicarb infusion at 50cc/hr. ABG with mild improvement. AM labs revealed K 6.2 and he was shifted and given kayexalate.Trialysis catheter was placed this morning and he subsequently developed hypotension and was started on levophed. He was  intubated later this morning. Nephrology consulted for CACHORRO. All history obtained by primary team and chart review as patient was intubated/sedated on exam. Consent for dialysis obtained by patient's wife and placed in chart. Of note, both of patient's parents were on dialysis.     Interval History:   Patient evaluated at  Bedside with family member present, no significant event overnight, worsening chest x ray today, still on SLED, total intake yesterday 7.7 L and output 5.3 L.     Review of patient's allergies indicates:   Allergen Reactions    No known drug allergies      Current Facility-Administered Medications   Medication Frequency    0.9%  NaCl infusion (CRRT USE ONLY) Continuous    0.9%  NaCl infusion (for blood administration) Q24H PRN    acetaminophen tablet 325 mg Q4H PRN    azithromycin (ZITHROMAX) 500 mg in sodium chloride 0.9% 250 mL IVPB Q24H    chlorhexidine 0.12 % solution 15 mL BID    dexmedetomidine (PRECEDEX) 400mcg/100mL 0.9% NaCL infusion Continuous    dextrose 50% injection 12.5 g PRN    dextrose 50% injection 25 g PRN    docusate 50 mg/5 mL liquid 100 mg BID    escitalopram oxalate tablet 20 mg Daily    famotidine (PF) injection 20 mg Daily    fentaNYL 2500 mcg in 0.9% sodium chloride 250 mL infusion premix (titrating) Continuous    ganciclovir (CYTOVENE) 200 mg in sodium chloride 0.9% 100 mL IVPB Q24H    glucagon (human recombinant) injection 1 mg PRN    glucose chewable tablet 16 g PRN    glucose chewable tablet 24 g PRN    heparin (porcine) injection 5,000 Units Q8H    hydrocortisone sodium succinate injection 100 mg Q8H    insulin regular (Humulin R) 100 Units in sodium chloride 0.9% 100 mL infusion Continuous    k phos di & mono-sod phos mono 250 mg tablet 2 tablet Q4H PRN    levothyroxine tablet 125 mcg Before breakfast    midazolam (VERSED) 1 mg/mL in sodium chloride 0.9% 100 mL infusion (titrating) Continuous    norepinephrine 16 mg in dextrose 5 % 250 mL  infusion Continuous    piperacillin-tazobactam 4.5 g in sodium chloride 0.9% 100 mL IVPB (ready to mix system) Q8H    polyethylene glycol packet 17 g Daily    posaconazole 300 mg in dextrose 5 % 150 mL infusion Daily    ribavirin capsule 600 mg BID    sodium chloride 0.9% flush 3 mL Q8H    vancomycin 1 gram/250 mL in sodium chloride 0.9% IVPB 1 g Q24H    vasopressin (PITRESSIN) 0.2 Units/mL in dextrose 5 % 100 mL infusion Continuous       Objective:     Vital Signs (Most Recent):  Temp: 97.3 °F (36.3 °C) (03/18/18 1500)  Pulse: (!) 114 (03/18/18 1500)  Resp: (!) 23 (03/18/18 0345)  BP: (!) 105/56 (03/18/18 1200)  SpO2: 98 % (03/18/18 1500)  O2 Device (Oxygen Therapy): ventilator (03/18/18 1500) Vital Signs (24h Range):  Temp:  [97.3 °F (36.3 °C)-99.7 °F (37.6 °C)] 97.3 °F (36.3 °C)  Pulse:  [101-128] 114  Resp:  [22-24] 23  SpO2:  [94 %-100 %] 98 %  BP: (104-116)/(49-56) 105/56  Arterial Line BP: ()/(37-50) 111/50     Weight: 76.3 kg (168 lb 3.4 oz) (03/17/18 0630)  Body mass index is 26.35 kg/m².  Body surface area is 1.9 meters squared.    I/O last 3 completed shifts:  In: 12252.4 [I.V.:8456.4; Blood:850; NG/GT:495; IV Piggyback:1250]  Out: 9370 [Urine:10; Other:9360]    Physical Exam   Constitutional: He appears well-developed and well-nourished. He is sedated and intubated.   HENT:   Head: Normocephalic and atraumatic.   Neck: Neck supple. No thyromegaly present.   Cardiovascular: Normal rate and regular rhythm.    Pulmonary/Chest: He is intubated. He has no wheezes. He has no rales.   Abdominal: Soft. He exhibits no distension.   Musculoskeletal: He exhibits no deformity. Edema: no pitting edema.   Skin: Skin is warm and dry. He is not diaphoretic.       Significant Labs:  ABGs:   Recent Labs  Lab 03/18/18  0801   PH 7.380   PCO2 42.6   HCO3 25.2   POCSATURATED 99   BE 0     BMP:   Recent Labs  Lab 03/18/18  0411 03/18/18  1356   *  157* 209*     103 103   CO2 21*  21* 20*   BUN 8   8 12   CREATININE 0.8  0.8 1.2   CALCIUM 8.3*  8.3* 8.1*   MG 1.8  1.8  --      CBC:   Recent Labs  Lab 03/18/18  0839   WBC 4.10   RBC 2.54*   HGB 7.2*   HCT 22.8*   PLT 91*   MCV 90   MCH 28.3   MCHC 31.6*     CMP:   Recent Labs  Lab 03/18/18  0411 03/18/18  1356   *  157* 209*   CALCIUM 8.3*  8.3* 8.1*   ALBUMIN 2.3*  2.3* 2.3*   PROT 5.8*  --      144 144   K 5.0  5.0 4.0   CO2 21*  21* 20*     103 103   BUN 8  8 12   CREATININE 0.8  0.8 1.2   ALKPHOS 187*  --    ALT 33  --    *  --    BILITOT 2.1*  --      All labs within the past 24 hours have been reviewed.       Assessment/Plan:     Acute renal failure superimposed on stage 4 chronic kidney disease    - baseline sCr 2.6-3.0 consistent with CKD stage IV  - anuric CACHORRO; likely ischemic ATN from prolonged pre-renal state (diarrhea) in setting of prograf renal vasoconstriction; cannot r/o septic ATN or Prograf toxicity  - SLED started 3/14  - Continues to be anuric only 5 cc in 24 hrs  - continue SLED today for metabolic clearance and volume management; UF goal 300-400 cc/hr. As tolerated by patient, maintain MAP>65           Edson Soriano  Nephrology  Fellow  [facility]    Pager [phone]

## 2018-03-18 NOTE — PROGRESS NOTES
Restart crrt started w/o diff via right a/v cath with lines taped and secured.  Machine set for nephrology orders.   Safety lure lock to venous chamber per protocol.  Air detector engaged.  Supplies stocked report given.

## 2018-03-18 NOTE — ASSESSMENT & PLAN NOTE
-S/P Bronch and intubation 3/14. Awaiting cultures.   -Appreciate PUL/ID Cx. RCV positive. Started Ribavarin/IVIG/stress dose steroids per lung transplant protocol for severe RSV pneumonia   -Continue empiric vanc/zosyn/azithromycin for broad bacterial coverage and posaconazole for nodular appearing lesions in lungs   -Started induction GCV until invasive CMV is excluded.  -Aspergillus antigen, fungitell, urine histo/blasto antigens, crypto antigen, RVP, urine legionella, and Quantiferon pending.  -Pulmonology managing vent  -CXR today shows pulmonary edema, so will increase CRRT to attempt to keep net -ve

## 2018-03-18 NOTE — ASSESSMENT & PLAN NOTE
-S/P thoracentesis X 2, followed by VATS/pleurex cath placement (January 2018) with removal of pleurex (February 2018) and 3rd thoracentesis 2/14/18 with no significant findings on fluid removal. Moderate right pleural effusion stable by CXR 3/11/18 and chest CT 3/13/18  -Last paracentesis was in January. Abd US 3/12/18 confirmed ongoing ascites (not tense at all). Will consider getting paracentesis this admit  -Continue Levophed for MAP 60. Now on Vaso as well

## 2018-03-18 NOTE — ASSESSMENT & PLAN NOTE
- Still with high residuals despite TF's being on hold since yesterday. No bowel sounds. Will get KUB and consider adding Reglan  - May have to change Synthroid to IV and find IV substitute for Lexapro  - Continue bowel program for now

## 2018-03-18 NOTE — SUBJECTIVE & OBJECTIVE
Interval History:   Patient evaluated at  Bedside with family member present, no significant event overnight, worsening chest x ray today, still on SLED, total intake yesterday 7.7 L and output 5.3 L.     Review of patient's allergies indicates:   Allergen Reactions    No known drug allergies      Current Facility-Administered Medications   Medication Frequency    0.9%  NaCl infusion (CRRT USE ONLY) Continuous    0.9%  NaCl infusion (for blood administration) Q24H PRN    acetaminophen tablet 325 mg Q4H PRN    azithromycin (ZITHROMAX) 500 mg in sodium chloride 0.9% 250 mL IVPB Q24H    chlorhexidine 0.12 % solution 15 mL BID    dexmedetomidine (PRECEDEX) 400mcg/100mL 0.9% NaCL infusion Continuous    dextrose 50% injection 12.5 g PRN    dextrose 50% injection 25 g PRN    docusate 50 mg/5 mL liquid 100 mg BID    escitalopram oxalate tablet 20 mg Daily    famotidine (PF) injection 20 mg Daily    fentaNYL 2500 mcg in 0.9% sodium chloride 250 mL infusion premix (titrating) Continuous    ganciclovir (CYTOVENE) 200 mg in sodium chloride 0.9% 100 mL IVPB Q24H    glucagon (human recombinant) injection 1 mg PRN    glucose chewable tablet 16 g PRN    glucose chewable tablet 24 g PRN    heparin (porcine) injection 5,000 Units Q8H    hydrocortisone sodium succinate injection 100 mg Q8H    insulin regular (Humulin R) 100 Units in sodium chloride 0.9% 100 mL infusion Continuous    k phos di & mono-sod phos mono 250 mg tablet 2 tablet Q4H PRN    levothyroxine tablet 125 mcg Before breakfast    midazolam (VERSED) 1 mg/mL in sodium chloride 0.9% 100 mL infusion (titrating) Continuous    norepinephrine 16 mg in dextrose 5 % 250 mL infusion Continuous    piperacillin-tazobactam 4.5 g in sodium chloride 0.9% 100 mL IVPB (ready to mix system) Q8H    polyethylene glycol packet 17 g Daily    posaconazole 300 mg in dextrose 5 % 150 mL infusion Daily    ribavirin capsule 600 mg BID    sodium chloride 0.9% flush 3  mL Q8H    vancomycin 1 gram/250 mL in sodium chloride 0.9% IVPB 1 g Q24H    vasopressin (PITRESSIN) 0.2 Units/mL in dextrose 5 % 100 mL infusion Continuous       Objective:     Vital Signs (Most Recent):  Temp: 97.3 °F (36.3 °C) (03/18/18 1500)  Pulse: (!) 114 (03/18/18 1500)  Resp: (!) 23 (03/18/18 0345)  BP: (!) 105/56 (03/18/18 1200)  SpO2: 98 % (03/18/18 1500)  O2 Device (Oxygen Therapy): ventilator (03/18/18 1500) Vital Signs (24h Range):  Temp:  [97.3 °F (36.3 °C)-99.7 °F (37.6 °C)] 97.3 °F (36.3 °C)  Pulse:  [101-128] 114  Resp:  [22-24] 23  SpO2:  [94 %-100 %] 98 %  BP: (104-116)/(49-56) 105/56  Arterial Line BP: ()/(37-50) 111/50     Weight: 76.3 kg (168 lb 3.4 oz) (03/17/18 0630)  Body mass index is 26.35 kg/m².  Body surface area is 1.9 meters squared.    I/O last 3 completed shifts:  In: 90705.4 [I.V.:8456.4; Blood:850; NG/GT:495; IV Piggyback:1250]  Out: 9370 [Urine:10; Other:9360]    Physical Exam   Constitutional: He appears well-developed and well-nourished. He is sedated and intubated.   HENT:   Head: Normocephalic and atraumatic.   Neck: Neck supple. No thyromegaly present.   Cardiovascular: Normal rate and regular rhythm.    Pulmonary/Chest: He is intubated. He has no wheezes. He has no rales.   Abdominal: Soft. He exhibits no distension.   Musculoskeletal: He exhibits no deformity. Edema: no pitting edema.   Skin: Skin is warm and dry. He is not diaphoretic.       Significant Labs:  ABGs:   Recent Labs  Lab 03/18/18  0801   PH 7.380   PCO2 42.6   HCO3 25.2   POCSATURATED 99   BE 0     BMP:   Recent Labs  Lab 03/18/18  0411 03/18/18  1356   *  157* 209*     103 103   CO2 21*  21* 20*   BUN 8  8 12   CREATININE 0.8  0.8 1.2   CALCIUM 8.3*  8.3* 8.1*   MG 1.8  1.8  --      CBC:   Recent Labs  Lab 03/18/18  0839   WBC 4.10   RBC 2.54*   HGB 7.2*   HCT 22.8*   PLT 91*   MCV 90   MCH 28.3   MCHC 31.6*     CMP:   Recent Labs  Lab 03/18/18  0411 03/18/18  1356   *   157* 209*   CALCIUM 8.3*  8.3* 8.1*   ALBUMIN 2.3*  2.3* 2.3*   PROT 5.8*  --      144 144   K 5.0  5.0 4.0   CO2 21*  21* 20*     103 103   BUN 8  8 12   CREATININE 0.8  0.8 1.2   ALKPHOS 187*  --    ALT 33  --    *  --    BILITOT 2.1*  --      All labs within the past 24 hours have been reviewed.

## 2018-03-18 NOTE — ASSESSMENT & PLAN NOTE
- baseline sCr 2.6-3.0 consistent with CKD stage IV  - anuric CACHORRO; likely ischemic ATN from prolonged pre-renal state (diarrhea) in setting of prograf renal vasoconstriction; cannot r/o septic ATN or Prograf toxicity  - SLED started 3/14  - Continues to be anuric only 5 cc in 24 hrs  - continue SLED today for metabolic clearance and volume management; UF goal 300-400 cc/hr. As tolerated by patient, maintain MAP>65

## 2018-03-18 NOTE — PROGRESS NOTES
[facility]  Infectious Disease  Progress Note    Patient Name: Lv Crocker  MRN: [id number]  Admission Date: 3/11/2018  Length of Stay: 6 days  Attending Physician: Jose A Lloyd MD  Primary Care Provider: Philippe Mohr MD    Isolation Status: Contact  Assessment/Plan:      * Acute respiratory failure with hypoxia    43yo man w/a history of DM1, Hashimoto's thyroiditis, and ICM (s/p OHT 11/18/2014, CMV D+/R+, steroid induction, on maintenance tacro/MMF/pred; c/b early hemorrhagic tamponade requiring washout, delayed closure, and pericardial window and subsequent AF w/RVR, and CACHORRO; late course c/b ABMR 4/2015 s/p rituxan, PLEX, and IVIG; subsequent C.diff/CMV reactivation, restrictive cardiomyopathy with recurrent pleural effusions s/p VATS/pleurex catheter 1/2018 with removal 2/2018 and ascites requiring repeated LVP, and CKD) who was admitted on 3/11/2018 with ~3wks of worsening profuse non-bloody diarrhea, associated cramping abdominal pain, N/V, and a week of acute on chronic cough, SOB, hypoxia requiring intubation, and fevers and was found to have multifocal pneumonia on CT chest due to RSV-A pneumonia with suspected superimposed bacterial pneumonia. He remains critically ill with ongoing pressor requirement, hypoxic RF, CRRT requirement, and minimal hepatitis.    - will continue empiric vanc/zosyn/azithromycin, posaconazole, and IV GCV induction for broad coverage -- can begin to taper bacterial coverage if BAL cx are negative at 48-72h - still no growth   - would continue oral ribavirin (while intubated avoiding inhaled) + IVIG + stress dose steroids per lung transplant protocol for severe RSV pneumonia  - await pending tests including: BAL cultures, urine legionella, Quantiferon, fungitell, Strongyloides ab (of note, O&P negative), adenovirus EIA, and [place] stool PCR panel  - still pending   - flex sig for samples to exclude CMV invasive disease or other pathogens would be  helpful but patient is not stable enough for this procedure currently; will empirically treat CMV for now and reassess later - seems more stable today - perhaps this could be done on 3-19-18             Anticipated Disposition:     Thank you for your consult. I will follow-up with patient. Please contact us if you have any additional questions.    Leonardo Becerra MD  Infectious Disease  [facility]    Subjective:     Principal Problem:Acute respiratory failure with hypoxia    HPI: 43yo man w/a history of DM1, Hashimoto's thyroiditis, and ICM (s/p OHT 11/18/2014, CMV D+/R+, steroid induction, on maintenance tacro/MMF/pred; c/b early hemorrhagic tamponade requiring washout, delayed closure, and pericardial window and subsequent AF w/RVR, and CACHORRO; late course c/b ABMR 4/2015 s/p rituxan, PLEX, and IVIG; subsequent C.diff/CMV reactivation, restrictive cardiomyopathy with recurrent pleural effusions s/p VATS/pleurex catheter 1/2018 with removal 2/2018 and ascites requiring repeated LVP, and CKD) who was admitted on 3/11/2018 with ~3wks of worsening profuse non-bloody diarrhea, associated cramping abdominal pain, N/V, and a week of acute on chronic cough, SOB, hypoxia requiring intubation, and fevers and was found to have multifocal pneumonia on CT chest due to RSV-A pneumonia with suspected superimposed bacterial pneumonia. He remains critically ill with ongoing pressor requirement, hypoxic RF, CRRT requirement, and minimal hepatitis.  Interval History:Stablet today but still intubated - no new issus     Review of Systems   Unable to perform ROS: Intubated   C    Objective:     Vital Signs (Most Recent):  Temp: 99 °F (37.2 °C) (03/17/18 2300)  Pulse: (!) 121 (03/17/18 2300)  Resp: (!) 0 (03/17/18 0740)  BP: (!) 108/53 (03/17/18 2300)  SpO2: 97 % (03/17/18 2300) Vital Signs (24h Range):  Temp:  [94.3 °F (34.6 °C)-99 °F (37.2 °C)] 99 °F (37.2 °C)  Pulse:  [] 121  Resp:  [0-31] 0  SpO2:  [93 %-100  %] 97 %  BP: (104-108)/(51-54) 108/53  Arterial Line BP: ()/(37-58) 119/44     Weight: 76.3 kg (168 lb 3.4 oz)  Body mass index is 26.35 kg/m².    Estimated Creatinine Clearance: 97.9 mL/min (based on SCr of 0.9 mg/dL).    Physical Exam   onstitutional: He appears well-developed and well-nourished. No distress.   Chronically ill appearing. Intubated.   HENT:   Head: Atraumatic.   Mouth/Throat: Oropharynx is clear and moist. No oropharyngeal exudate.   Eyes: Conjunctivae are normal. Pupils are equal, round, and reactive to light. No scleral icterus.   Neck: Neck supple.   Cardiovascular: Normal rate and regular rhythm.  Exam reveals no friction rub.    No murmur heard.  Pulmonary/Chest: No respiratory distress. He has no wheezes. He has rales. He exhibits no tenderness.   Intubated.   Abdominal: Soft. Bowel sounds are normal. He exhibits no distension. There is no tenderness. There is no rebound and no guarding.   Musculoskeletal: Normal range of motion. He exhibits no edema.   Lymphadenopathy:     He has no cervical adenopathy.   Neurological:   Intubated/sedated.   Skin: No rash noted. No erythema. IV lines OK     Significant Labs: All pertinent labs within the past 24 hours have been reviewed.    Significant Imaging: I have reviewed all pertinent imaging results/findings within the past 24 hours.

## 2018-03-18 NOTE — SUBJECTIVE & OBJECTIVE
Interval History: No acute events overnight. On Fentanyl 50 mcg and Precedex. Continues to have higher peak pressures with plateau pressures around 30. Hgb dropped acutely to 5.6 with transfusion given.    Objective:     Vital Signs (Most Recent):  Temp: 97.3 °F (36.3 °C) (03/18/18 1500)  Pulse: (!) 114 (03/18/18 1500)  Resp: (!) 23 (03/18/18 0345)  BP: 113/62 (03/18/18 1500)  SpO2: 98 % (03/18/18 1500) Vital Signs (24h Range):  Temp:  [97.3 °F (36.3 °C)-99.7 °F (37.6 °C)] 97.3 °F (36.3 °C)  Pulse:  [101-128] 114  Resp:  [22-24] 23  SpO2:  [94 %-100 %] 98 %  BP: (104-116)/(49-62) 113/62  Arterial Line BP: ()/(37-55) 119/55     Weight: 76.3 kg (168 lb 3.4 oz)  Body mass index is 26.35 kg/m².    Intake/Output Summary (Last 24 hours) at 03/18/18 1613  Last data filed at 03/18/18 1500   Gross per 24 hour   Intake          7652.16 ml   Output             5499 ml   Net          2153.16 ml     Physical Exam   Constitutional: He is oriented to person, place, and time. He appears well-developed and well-nourished.   HENT:   Head: Normocephalic and atraumatic.   Eyes: Conjunctivae are normal.   Cardiovascular: Normal rate, regular rhythm and normal heart sounds.    Pulmonary/Chest: Effort normal. He has no wheezes. He has no rales.   Intubated, bronchial breath sounds on the R, clear on the L   Abdominal: Soft. Bowel sounds are normal. He exhibits no distension. There is no tenderness.   Musculoskeletal: He exhibits no edema.   Neurological: He is alert and oriented to person, place, and time.   Skin: Skin is warm and dry.   Nursing note and vitals reviewed.    Vents:  Vent Mode: A/C (03/18/18 1333)  Ventilator Initiated: Yes (03/14/18 0932)  Set Rate: 21 bmp (03/18/18 1333)  Vt Set: 400 mL (03/18/18 1333)  Pressure Support: 0 cmH20 (03/18/18 1333)  PEEP/CPAP: 5 cmH20 (03/18/18 1333)  Oxygen Concentration (%): 45 (03/18/18 1500)  Peak Airway Pressure: 39 cmH2O (03/18/18 1333)  Plateau Pressure: 32 cmH20 (03/18/18  1333)  Total Ve: 8.96 mL (03/18/18 1333)  F/VT Ratio<105 (RSBI): (!) 0 (03/17/18 0740)    Lines/Drains/Airways     Central Venous Catheter Line                 Trialysis (Dialysis) Catheter 03/14/18 0813 right internal jugular 4 days          Drain                 NG/OG Tube 03/14/18 1300 [place] mouth 4 days          Airway                 Airway - Non-Surgical 03/14/18 0930 Endotracheal Tube 4 days          Arterial Line                 Arterial Line 03/14/18 1600 Right Femoral 4 days          Peripheral Intravenous Line                 Peripheral IV - Single Lumen 03/13/18 1600 Left Antecubital 5 days         Peripheral IV - Single Lumen 03/14/18 1736 Right Forearm 3 days         Midline Catheter Insertion/Assessment  - Single Lumen 03/17/18 1454 Left cephalic vein (lateral side of arm) 18g x 8cm 1 day              Significant Labs:    CBC/Anemia Profile:    Recent Labs  Lab 03/17/18  2352 03/18/18  0411 03/18/18  0839   WBC 4.12 3.57* 4.10   HGB 6.7* 7.5* 7.2*   HCT 20.5* 23.6* 22.8*   * 102* 91*   MCV 91 90 90   RDW 15.1* 15.8* 16.6*     Chemistries:    Recent Labs  Lab 03/17/18  0414  03/17/18  1351 03/17/18  2231 03/18/18  0411 03/18/18  1356     145  145  145  < > 144 145 144  144 144   K 4.1  4.1  4.1  4.1  4.1  4.1  4.1  < > 4.4 5.0 5.0  5.0 4.0     102  102  102  < > 104 104 103  103 103   CO2 24  24  24  24  < > 21* 21* 21*  21* 20*   BUN 6  6  6  6  < > 6 9 8  8 12   CREATININE 0.7  0.7  0.7  0.7  < > 0.7 0.9 0.8  0.8 1.2   CALCIUM 9.7  9.7  9.7  9.7  < > 8.9 8.5* 8.3*  8.3* 8.1*   ALBUMIN 2.5*  2.5*  2.5*  --  2.4* 2.3* 2.3*  2.3* 2.3*   PROT 6.8  --   --  6.2 5.8*  --    BILITOT 2.1*  --   --  2.1* 2.1*  --    ALKPHOS 200*  --   --  196* 187*  --    ALT 13  --   --  21 33  --    AST 51*  --   --  67* 101*  --    MG 2.1  --  1.7 2.0 1.8  1.8  --    PHOS 2.4*  2.4*  --  2.7 3.1 3.2 3.8   < > = values in this interval not displayed.    All  pertinent labs within the past 24 hours have been reviewed.    Significant Imaging:  I have reviewed and interpreted all pertinent imaging results/findings within the past 24 hours.

## 2018-03-18 NOTE — ASSESSMENT & PLAN NOTE
41yo man w/a history of DM1, Hashimoto's thyroiditis, and ICM (s/p OHT 11/18/2014, CMV D+/R+, steroid induction, on maintenance tacro/MMF/pred; c/b early hemorrhagic tamponade requiring washout, delayed closure, and pericardial window and subsequent AF w/RVR, and CACHORRO; late course c/b ABMR 4/2015 s/p rituxan, PLEX, and IVIG; subsequent C.diff/CMV reactivation, restrictive cardiomyopathy with recurrent pleural effusions s/p VATS/pleurex catheter 1/2018 with removal 2/2018 and ascites requiring repeated LVP, and CKD) who was admitted on 3/11/2018 with ~3wks of worsening profuse non-bloody diarrhea, associated cramping abdominal pain, N/V, and a week of acute on chronic cough, SOB, hypoxia requiring intubation, and fevers and was found to have multifocal pneumonia on CT chest due to RSV-A pneumonia with suspected superimposed bacterial pneumonia. He remains critically ill with ongoing pressor requirement, hypoxic RF, CRRT requirement, and minimal hepatitis.    - will continue empiric vanc/zosyn/azithromycin, posaconazole, and IV GCV induction for broad coverage -- can begin to taper bacterial coverage if BAL cx are negative at 48-72h - still no growth   - would continue oral ribavirin (while intubated avoiding inhaled) + IVIG + stress dose steroids per lung transplant protocol for severe RSV pneumonia  - await pending tests including: BAL cultures, urine legionella, Quantiferon, fungitell, Strongyloides ab (of note, O&P negative), adenovirus EIA, and [place] stool PCR panel  - still pending   - flex sig for samples to exclude CMV invasive disease or other pathogens would be helpful but patient is not stable enough for this procedure currently; will empirically treat CMV for now and reassess later - seems more stable today - perhaps this could be done on 3-19-18

## 2018-03-18 NOTE — ASSESSMENT & PLAN NOTE
- WBC count has trended back down to 3.57. May require neupogen if trends down with current treatment.

## 2018-03-18 NOTE — PROGRESS NOTES
[facility]  Heart Transplant  Progress Note    Patient Name: Lv Crocker  MRN: [id number]  Admission Date: 3/11/2018  Hospital Length of Stay: 6 days  Attending Physician: Jose A Lloyd MD  Primary Care Provider: Philippe Mohr MD  Principal Problem:Acute respiratory failure with hypoxia    Subjective:     Interval History: Requiring more pressor support overnight. Now requiring high FiO2. CVP 11 with CRRTrunning to keep net even, but CXR looks wet.  sVO2 70%. Still with high residuals despite holding TF's since yesterday; no bowel sounds    Continuous Infusions:   dexmedetomidine (PRECEDEX) infusion 0.8 mcg/kg/hr (03/18/18 0515)    fentanyl 5 mL/hr at 03/18/18 0500    insulin (HUMAN R) infusion (adults) 0.1 Units/hr (03/18/18 0500)    midazolam (VERSED) infusion (titrating) 10 mg/hr (03/16/18 2200)    norepinephrine bitartrate-D5W 0.7 mcg/kg/min (03/18/18 0515)    vasopressin (PITRESSIN) infusion 0.04 Units/min (03/18/18 0514)     Scheduled Meds:   azithromycin (ZITHROMAX) 500 mg IVPB  500 mg Intravenous Q24H    chlorhexidine  15 mL Mouth/Throat BID    docusate  100 mg Per NG tube BID    escitalopram oxalate  20 mg Per NG tube Daily    famotidine (PF)  20 mg Intravenous Daily    ganciclovir (CYTOVENE) IVPB  200 mg Intravenous Q24H    heparin (porcine)  5,000 Units Subcutaneous Q8H    hydrocortisone sodium succinate  100 mg Intravenous Q8H    levothyroxine  125 mcg Per NG tube Before breakfast    piperacillin-tazobactam (ZOSYN) IVPB  4.5 g Intravenous Q8H    polyethylene glycol  17 g Oral Daily    posaconazole (NOXAFIL) 300 mg in dextrose 5% IVPB  300 mg Intravenous Daily    ribavirin  600 mg Oral BID    sodium chloride 0.9%  3 mL Intravenous Q8H    vancomycin (VANCOCIN) IVPB  1,000 mg Intravenous Q24H     PRN Meds:sodium chloride, acetaminophen, dextrose 50%, dextrose 50%, glucagon (human recombinant), glucose, glucose, k phos di & mono-sod phos mono    Review of  patient's allergies indicates:   Allergen Reactions    No known drug allergies      Objective:     Vital Signs (Most Recent):  Temp: 99.3 °F (37.4 °C) (03/18/18 0500)  Pulse: (!) 127 (03/18/18 0500)  Resp: (!) 23 (03/18/18 0345)  BP: (!) 113/51 (03/18/18 0400)  SpO2: 96 % (03/18/18 0500) Vital Signs (24h Range):  Temp:  [94.3 °F (34.6 °C)-99.5 °F (37.5 °C)] 99.3 °F (37.4 °C)  Pulse:  [] 127  Resp:  [0-31] 23  SpO2:  [93 %-100 %] 96 %  BP: (104-113)/(49-54) 113/51  Arterial Line BP: ()/(37-58) 112/42     Patient Vitals for the past 72 hrs (Last 3 readings):   Weight   03/17/18 0630 76.3 kg (168 lb 3.4 oz)   03/16/18 0626 81 kg (178 lb 9.2 oz)   03/15/18 1000 81 kg (178 lb 9.2 oz)     Body mass index is 26.35 kg/m².      Intake/Output Summary (Last 24 hours) at 03/18/18 0551  Last data filed at 03/18/18 0516   Gross per 24 hour   Intake          7663.38 ml   Output             5544 ml   Net          2119.38 ml       Hemodynamic Parameters:  CVP:  [10 mmHg] 10 mmHg    Telemetry: ST    Physical Exam   Constitutional: He appears well-developed and well-nourished.   Appears ill   HENT:   Head: Normocephalic and atraumatic.   Eyes: Conjunctivae are normal. Pupils are equal, round, and reactive to light.   Neck: No thyromegaly present.   RIJ trialysis   Cardiovascular: Regular rhythm.    Tachycardic   Pulmonary/Chest:   Intubated and ventilated  Breath sounds are slightly decreased right base. Essentially CTA bilaterally   Abdominal: Soft.   No bowel sounds. + ascites   Musculoskeletal:   1-2+ BUE edema; no MITZY; no cyanosis   Neurological:   Arouses and becomes agitated with sedation breaks   Skin: Skin is warm and dry. Capillary refill takes less than 2 seconds. There is pallor.       Significant Labs:  CBC:    Recent Labs  Lab 03/17/18  2241 03/17/18  2352 03/18/18  0411   WBC 3.43* 4.12 3.57*   RBC 1.90* 2.26* 2.61*   HGB 5.6* 6.7* 7.5*   HCT 18.3* 20.5* 23.6*   PLT 99* 126* 102*   MCV 96 91 90   MCH 29.5  29.6 28.7   MCHC 30.6* 32.7 31.8*     BNP:    Recent Labs  Lab 03/11/18  1922 03/13/18  0815   * 359*     CMP:    Recent Labs  Lab 03/17/18  0414  03/17/18  1351 03/17/18  2231 03/18/18  0411   *  133*  133*  133*  < > 141* 162* 157*  157*   CALCIUM 9.7  9.7  9.7  9.7  < > 8.9 8.5* 8.3*  8.3*   ALBUMIN 2.5*  2.5*  2.5*  --  2.4* 2.3* 2.3*  2.3*   PROT 6.8  --   --  6.2 5.8*     145  145  145  < > 144 145 144  144   K 4.1  4.1  4.1  4.1  4.1  4.1  4.1  < > 4.4 5.0 5.0  5.0   CO2 24  24  24  24  < > 21* 21* 21*  21*     102  102  102  < > 104 104 103  103   BUN 6  6  6  6  < > 6 9 8  8   CREATININE 0.7  0.7  0.7  0.7  < > 0.7 0.9 0.8  0.8   ALKPHOS 200*  --   --  196* 187*   ALT 13  --   --  21 33   AST 51*  --   --  67* 101*   BILITOT 2.1*  --   --  2.1* 2.1*   < > = values in this interval not displayed.   Coagulation:   No results for input(s): PT, INR, APTT in the last 168 hours.  LDH:  No results for input(s): LDH in the last 72 hours.  Microbiology:  Microbiology Results (last 7 days)     Procedure Component Value Units Date/Time    Blood culture [[id number]]     Order Status:  No result Specimen:  Blood     Blood culture [[id number]]     Order Status:  No result Specimen:  Blood     Blood culture [[id number]]     Order Status:  No result Specimen:  Blood     Blood culture [[id number]]     Order Status:  No result Specimen:  Blood     Blood culture [[id number]] Collected:  03/13/18 1637    Order Status:  Completed Specimen:  Blood from Peripheral, Antecubital, Left Updated:  03/17/18 2012     Blood Culture, Routine No Growth to date     Blood Culture, Routine No Growth to date     Blood Culture, Routine No Growth to date     Blood Culture, Routine No Growth to date     Blood Culture, Routine No Growth to date    Blood culture [[id number]] Collected:  03/13/18 1701    Order Status:  Completed Specimen:  Blood from Peripheral, Forearm, Left Updated:   03/17/18 2012     Blood Culture, Routine No Growth to date     Blood Culture, Routine No Growth to date     Blood Culture, Routine No Growth to date     Blood Culture, Routine No Growth to date     Blood Culture, Routine No Growth to date    Culture, Respiratory with Gram Stain [[id number]] Collected:  03/15/18 1541    Order Status:  Completed Specimen:  Respiratory from Sputum Updated:  03/17/18 0914     Respiratory Culture No growth     Gram Stain (Respiratory) <10 epithelial cells per low power field.     Gram Stain (Respiratory) Rare WBC's      Gram Stain (Respiratory) No organisms seen    Culture, Respiratory with Gram Stain [[id number]] Collected:  03/14/18 1137    Order Status:  Completed Specimen:  Respiratory from BAL, TRIPP Updated:  03/16/18 1046     Respiratory Culture No growth     Gram Stain (Respiratory) No WBC's     Gram Stain (Respiratory) No organisms seen    AFB Culture & Smear [[id number]] Collected:  03/14/18 1137    Order Status:  Completed Specimen:  Bronchial Wash from Amniotic Fluid Updated:  03/15/18 2127     AFB Culture & Smear Culture in progress     AFB CULTURE STAIN No acid fast bacilli seen.    Respiratory Viral Panel by PCR [facility]; Nasal Swab [[id number]]  (Abnormal) Collected:  03/13/18 1733    Order Status:  Completed Specimen:  Respiratory Updated:  03/15/18 1302     Respiratory Virus Panel, source Nasal Swab     RVP - Adenovirus Not Detected     Comment: Detects Serotypes B and E. Detection of Serotype C may   be limited. If Adenovirus infection is suspected and a   Not Detected result is returned the sample should be   re-tested for Adenovirus using an independent method  (e.g. [employer] Adenovirus Quantitative Real-Time  PCR test.          Enterovirus Not Detected     Comment: Cross-reactivity has been observed between certain Rhinovirus  strains and the Enterovirus assay.          Human Bocavirus Not Detected     Human Coronavirus Not Detected     Comment: The Human  Coronavirus assay detects Human coronavirus types  229E, OC43,NL63 and HKU1.          RVP - Human Metapneumovirus (hMPV) Not Detected     RVP - Influenza A Not Detected     Influenza A - K4T9-96 Not Detected     RVP - Influenza B Not Detected     Parainfluenza Not Detected     Respiratory Syncytial VirusVirus (RSV) A Positive (A)     Comment: The Respiratory Syncytial Viral assay detects types A and B,  however it does not distinguish between the two.          RVP - Rhinovirus Not Detected     Comment: Cross-Reactivity has been observed between certain   Rhinovirus strains and the Enterovirus assay.  Target Enriched Mulitplex Polymerase Chain Reaction (TEM-PCR)  allows for the detection of multiple pathogens out of a single  reaction.  This test was developed and its performance   characteristics determined by [employer].  It has not   been cleared or approved by the U.S.Food and Drug Administration.  Results should be used in conjunction with clinical findings,   and should not form the sole basis for a diagnosis or treatment  decision.  TEM-PCR is a licensed technology of [employer].         Narrative:       Receiving Lab:->[facility]    Culture, Respiratory with Gram Stain [[id number]] Collected:  03/13/18 1441    Order Status:  Completed Specimen:  Respiratory from Sputum, Expectorated Updated:  03/15/18 1237     Respiratory Culture Normal respiratory hank     Gram Stain (Respiratory) <10 epithelial cells per low power field.     Gram Stain (Respiratory) Rare WBC's     Gram Stain (Respiratory) Moderate Gram positive cocci     Gram Stain (Respiratory) Many Gram negative rods    Fungus culture [[id number]] Collected:  03/14/18 1137    Order Status:  Completed Specimen:  Bronchial Wash from Amniotic Fluid Updated:  03/15/18 1124     Fungus (Mycology) Culture Culture in progress    Stool culture **CANNOT BE ORDERED STAT** [[id number]] Collected:  03/11/18 1923    Order Status:  Completed Specimen:   Stool from Stool Updated:  03/14/18 1549     Stool Culture No enteric hank     Stool Culture No Salmonella,Shigella,Vibrio,Campylobacter,Yersinia isolated.    Culture, Fluid  (Aerobic) [[id number]] Collected:  03/14/18 1137    Order Status:  Canceled Specimen:  Bronchial Wash from Amniotic Fluid Updated:  03/14/18 1416    Cryptococcal antigen [[id number]] Collected:  03/14/18 0425    Order Status:  Completed Specimen:  Blood from Blood Updated:  03/14/18 1217     Cryptococcal Ag, Blood Negative    Culture, Respiratory with Gram Stain [[id number]] Collected:  03/13/18 1701    Order Status:  Completed Specimen:  Respiratory from Sputum, Expectorated Updated:  03/13/18 2224     Respiratory Culture Specimen inadequate - culture not performed     Gram Stain (Respiratory) >10epis/lfp and <than many WBC's     Gram Stain (Respiratory) Predominance of oropharyngeal hank. Please recollect.    Clostridium difficile EIA [[id number]] Collected:  03/11/18 1923    Order Status:  Completed Specimen:  Stool from Stool Updated:  03/11/18 2355     C. diff Antigen Negative     C difficile Toxins A+B, EIA Negative     Comment: Testing not recommended for children <24 months old.       Stool culture [[id number]]     Order Status:  Completed Specimen:  Stool from Stool     Stool culture [[id number]]     Order Status:  Canceled Specimen:  Stool from Stool     E. coli 0157 antigen [[id number]] Collected:  03/11/18 1923    Order Status:  Canceled Specimen:  Stool from Stool Updated:  03/11/18 2001          I have reviewed all pertinent labs within the past 24 hours.    Estimated Creatinine Clearance: 110.2 mL/min (based on SCr of 0.8 mg/dL).    Diagnostic Results:  I have reviewed all pertinent imaging results/findings within the past 24 hours.    Assessment and Plan:     43 yo male S/P OHTx 11/18/2014, suspected restrictive versus constrictive CMP post-transplant as well as CKD stage IV that has resulted in ascites/pleural effusion, was getting  monthly paracentesis and thoracentesis. Most recently has had ongoing issues with his lungs, had gotten 2 thoracenteses, after which he underwent VATS 01/19/18 followed by pleurex catheter placement and effusion drainage 01/11/18, subsequently had it removed 02/07/18 after drainage had decreased despite multiple attempts to drain it. Has been having significant coughing fits that result in him being near-syncopal at times, although difficult to say if he has passed out or not- patient most recently was admitted to the hospital for increased AGUILAR, coughing and pre-syncope 02/11-02/14/18 [place] [facility] [place]   Patient was started on antibiotics for suspected bacterial infection, with some diarrhea, bronchitis, and possible pneumonia. Ct chest showed some pleural fluid collection and after talking with Dr. James, we arranged for him to receive a diagnostic tap with IR, from which the fluid collection demonstrated no growth or significant findings at all really. Cell counts do not appear to have been sent but will recheck. Patient states since his hospital stay, yesterday had a significant coughing fit again, after which he nearly passed out, is being seen in clinic today for this. Denies any cardiopulmonary complaints, no leg swelling, has some abdominal swelling, which is moderate for him. Denies significant shortness of breath with mild exertion, had 6MWT yesterday to see if he qualified for home O2, and his O2 sats actually improved after recovery from where he started initially. He and his wife admit he is in bed most days, not very active.     Mr. Crocker presented to the ED 3/11/18 with approximately 3 weeks of worsening diarrhea and 2-3 days of sinus pressure, drainage, and worsened cough.  He notes that he had a coughing fit last night and passed out, coughed throughout most of the night.  This also happened on 2/25/18.  He last saw Dr Ferreira in clinic on 2/20/18 where he was taken off myfortic and switched to cellcept  (he hadn't been on cellcept because of leukopenia when they tried post-transplant).  Though his diarrhea had improved after his last discharge, he states it has increased now to 15x/day, clear/yellow/green, no fevers, no exacerbating foods per say.  He was taken back off the cellcept and placed back on myfortic this past week and has not noticed immediate change in diarrhea. He also reports his baseline coughing fits havent improved and are minimally responsive to tessalon pearls.  Came on last night, he lost consciousness during a fit once which is relatively normal for him, and had two more during HPI.  He notes no sick contacts but does state he's had some URI symptoms as above.  His baseline vitals are usually -120 and BP 90s/60s-110s/70s.  He reports a history of intermittent diarrhea - did have Cdiff many years ago but this smells different.  No abdominal pain, no nausea, no vomiting.  No blood in diarrhea.  Appears last c-scope in 2015 with no evidence of infection or inflammatory processes.  He does report IBS which is different that this.       * Acute respiratory failure with hypoxia    -S/P Bronch and intubation 3/14. Awaiting cultures.   -Appreciate PUL/ID Cx. RCV positive. Started Ribavarin/IVIG/stress dose steroids per lung transplant protocol for severe RSV pneumonia   -Continue empiric vanc/zosyn/azithromycin for broad bacterial coverage and posaconazole for nodular appearing lesions in lungs   -Started induction GCV until invasive CMV is excluded.  -Aspergillus antigen, fungitell, urine histo/blasto antigens, crypto antigen, RVP, urine legionella, and Quantiferon pending.  -Pulmonology managing vent  -CXR today shows pulmonary edema, so will increase CRRT to attempt to keep net -ve        Ileus    - Still with high residuals despite TF's being on hold since yesterday. No bowel sounds. Will get KUB and consider adding Reglan  - May have to change Synthroid to IV and find IV substitute for  Lexapro  - Continue bowel program for now        Restrictive cardiomyopathy    -S/P thoracentesis X 2, followed by VATS/pleurex cath placement (January 2018) with removal of pleurex (February 2018) and 3rd thoracentesis 2/14/18 with no significant findings on fluid removal. Moderate right pleural effusion stable by CXR 3/11/18 and chest CT 3/13/18  -Last paracentesis was in January. Abd US 3/12/18 confirmed ongoing ascites (not tense at all). Will consider getting paracentesis this admit  -Continue Levophed for MAP 60. Now on Vaso as well        Heart transplanted    - TTE 3/12/18 showed LVEF 50-55% (but no substantial change when compared to previous echo), RV normal and IVC 8  - Had -ve DSE on 11/30/17  - Current immunosuppression: Pred/Prograf/MMF on hold. Continue hydrocortisone 100mg Q8H.         Leukopenia    - WBC count has trended back down to 3.57. May require neupogen if trends down with current treatment.        Acute renal failure superimposed on stage 4 chronic kidney disease    - CVP 11 this am.  - Appreciate Neph Cx. Continue CRRT as tolerated.         Type 1 diabetes mellitus with stage 3 chronic kidney disease    - Appreciate Endocrine's recs        Hypothyroidism due to Hashimoto's thyroiditis    - TSH low at 0.101 with normal FT4. Appreciate Endocrine's recs        Septic shock    -Continue Levo for MAP 60.  -Added hydrocortisone 100mg TID.  -IV Abx as above.   -Tube [place] [id number]  Heart Transplant  [facility]

## 2018-03-18 NOTE — ASSESSMENT & PLAN NOTE
- No more diarrhea.  - C. Diff -ve. Other stool studies pending  - CMV DNA and Norovirus PCR -ve  - Strongyloides ab, adenovirus EIA, and [place] stool PCR panel pending  - Consulted GI - plan upper and lower endoscopy once more stable.

## 2018-03-18 NOTE — SUBJECTIVE & OBJECTIVE
Interval History: Requiring more pressor support overnight. Now requiring high FiO2. CVP 11 with CRRTrunning to keep net even, but CXR looks wet.  sVO2 70%. Still with high residuals despite holding TF's since yesterday; no bowel sounds    Continuous Infusions:   dexmedetomidine (PRECEDEX) infusion 0.8 mcg/kg/hr (03/18/18 0515)    fentanyl 5 mL/hr at 03/18/18 0500    insulin (HUMAN R) infusion (adults) 0.1 Units/hr (03/18/18 0500)    midazolam (VERSED) infusion (titrating) 10 mg/hr (03/16/18 2200)    norepinephrine bitartrate-D5W 0.7 mcg/kg/min (03/18/18 0515)    vasopressin (PITRESSIN) infusion 0.04 Units/min (03/18/18 0514)     Scheduled Meds:   azithromycin (ZITHROMAX) 500 mg IVPB  500 mg Intravenous Q24H    chlorhexidine  15 mL Mouth/Throat BID    docusate  100 mg Per NG tube BID    escitalopram oxalate  20 mg Per NG tube Daily    famotidine (PF)  20 mg Intravenous Daily    ganciclovir (CYTOVENE) IVPB  200 mg Intravenous Q24H    heparin (porcine)  5,000 Units Subcutaneous Q8H    hydrocortisone sodium succinate  100 mg Intravenous Q8H    levothyroxine  125 mcg Per NG tube Before breakfast    piperacillin-tazobactam (ZOSYN) IVPB  4.5 g Intravenous Q8H    polyethylene glycol  17 g Oral Daily    posaconazole (NOXAFIL) 300 mg in dextrose 5% IVPB  300 mg Intravenous Daily    ribavirin  600 mg Oral BID    sodium chloride 0.9%  3 mL Intravenous Q8H    vancomycin (VANCOCIN) IVPB  1,000 mg Intravenous Q24H     PRN Meds:sodium chloride, acetaminophen, dextrose 50%, dextrose 50%, glucagon (human recombinant), glucose, glucose, k phos di & mono-sod phos mono    Review of patient's allergies indicates:   Allergen Reactions    No known drug allergies      Objective:     Vital Signs (Most Recent):  Temp: 99.3 °F (37.4 °C) (03/18/18 0500)  Pulse: (!) 127 (03/18/18 0500)  Resp: (!) 23 (03/18/18 0345)  BP: (!) 113/51 (03/18/18 0400)  SpO2: 96 % (03/18/18 0500) Vital Signs (24h Range):  Temp:  [94.3 °F (34.6  °C)-99.5 °F (37.5 °C)] 99.3 °F (37.4 °C)  Pulse:  [] 127  Resp:  [0-31] 23  SpO2:  [93 %-100 %] 96 %  BP: (104-113)/(49-54) 113/51  Arterial Line BP: ()/(37-58) 112/42     Patient Vitals for the past 72 hrs (Last 3 readings):   Weight   03/17/18 0630 76.3 kg (168 lb 3.4 oz)   03/16/18 0626 81 kg (178 lb 9.2 oz)   03/15/18 1000 81 kg (178 lb 9.2 oz)     Body mass index is 26.35 kg/m².      Intake/Output Summary (Last 24 hours) at 03/18/18 0551  Last data filed at 03/18/18 0516   Gross per 24 hour   Intake          7663.38 ml   Output             5544 ml   Net          2119.38 ml       Hemodynamic Parameters:  CVP:  [10 mmHg] 10 mmHg    Telemetry: ST    Physical Exam   Constitutional: He appears well-developed and well-nourished.   Appears ill   HENT:   Head: Normocephalic and atraumatic.   Eyes: Conjunctivae are normal. Pupils are equal, round, and reactive to light.   Neck: No thyromegaly present.   RIJ trialysis   Cardiovascular: Regular rhythm.    Tachycardic   Pulmonary/Chest:   Intubated and ventilated  Breath sounds are slightly decreased right base. Essentially CTA bilaterally   Abdominal: Soft.   No bowel sounds. + ascites   Musculoskeletal:   1-2+ BUE edema; no MITZY; no cyanosis   Neurological:   Arouses and becomes agitated with sedation breaks   Skin: Skin is warm and dry. Capillary refill takes less than 2 seconds. There is pallor.       Significant Labs:  CBC:    Recent Labs  Lab 03/17/18  2241 03/17/18  2352 03/18/18  0411   WBC 3.43* 4.12 3.57*   RBC 1.90* 2.26* 2.61*   HGB 5.6* 6.7* 7.5*   HCT 18.3* 20.5* 23.6*   PLT 99* 126* 102*   MCV 96 91 90   MCH 29.5 29.6 28.7   MCHC 30.6* 32.7 31.8*     BNP:    Recent Labs  Lab 03/11/18  1922 03/13/18  0815   * 359*     CMP:    Recent Labs  Lab 03/17/18  0414  03/17/18  1351 03/17/18  2231 03/18/18  0411   *  133*  133*  133*  < > 141* 162* 157*  157*   CALCIUM 9.7  9.7  9.7  9.7  < > 8.9 8.5* 8.3*  8.3*   ALBUMIN 2.5*  2.5*   2.5*  --  2.4* 2.3* 2.3*  2.3*   PROT 6.8  --   --  6.2 5.8*     145  145  145  < > 144 145 144  144   K 4.1  4.1  4.1  4.1  4.1  4.1  4.1  < > 4.4 5.0 5.0  5.0   CO2 24  24  24  24  < > 21* 21* 21*  21*     102  102  102  < > 104 104 103  103   BUN 6  6  6  6  < > 6 9 8  8   CREATININE 0.7  0.7  0.7  0.7  < > 0.7 0.9 0.8  0.8   ALKPHOS 200*  --   --  196* 187*   ALT 13  --   --  21 33   AST 51*  --   --  67* 101*   BILITOT 2.1*  --   --  2.1* 2.1*   < > = values in this interval not displayed.   Coagulation:   No results for input(s): PT, INR, APTT in the last 168 hours.  LDH:  No results for input(s): LDH in the last 72 hours.  Microbiology:  Microbiology Results (last 7 days)     Procedure Component Value Units Date/Time    Blood culture [[id number]]     Order Status:  No result Specimen:  Blood     Blood culture [[id number]]     Order Status:  No result Specimen:  Blood     Blood culture [[id number]]     Order Status:  No result Specimen:  Blood     Blood culture [[id number]]     Order Status:  No result Specimen:  Blood     Blood culture [[id number]] Collected:  03/13/18 1637    Order Status:  Completed Specimen:  Blood from Peripheral, Antecubital, Left Updated:  03/17/18 2012     Blood Culture, Routine No Growth to date     Blood Culture, Routine No Growth to date     Blood Culture, Routine No Growth to date     Blood Culture, Routine No Growth to date     Blood Culture, Routine No Growth to date    Blood culture [[id number]] Collected:  03/13/18 1701    Order Status:  Completed Specimen:  Blood from Peripheral, Forearm, Left Updated:  03/17/18 2012     Blood Culture, Routine No Growth to date     Blood Culture, Routine No Growth to date     Blood Culture, Routine No Growth to date     Blood Culture, Routine No Growth to date     Blood Culture, Routine No Growth to date    Culture, Respiratory with Gram Stain [[id number]] Collected:  03/15/18 1541    Order Status:  Completed  Specimen:  Respiratory from Sputum Updated:  03/17/18 0914     Respiratory Culture No growth     Gram Stain (Respiratory) <10 epithelial cells per low power field.     Gram Stain (Respiratory) Rare WBC's      Gram Stain (Respiratory) No organisms seen    Culture, Respiratory with Gram Stain [[id number]] Collected:  03/14/18 1137    Order Status:  Completed Specimen:  Respiratory from BAL, TRIPP Updated:  03/16/18 1046     Respiratory Culture No growth     Gram Stain (Respiratory) No WBC's     Gram Stain (Respiratory) No organisms seen    AFB Culture & Smear [[id number]] Collected:  03/14/18 1137    Order Status:  Completed Specimen:  Bronchial Wash from Amniotic Fluid Updated:  03/15/18 2127     AFB Culture & Smear Culture in progress     AFB CULTURE STAIN No acid fast bacilli seen.    Respiratory Viral Panel by PCR [facility]; Nasal Swab [[id number]]  (Abnormal) Collected:  03/13/18 1733    Order Status:  Completed Specimen:  Respiratory Updated:  03/15/18 1302     Respiratory Virus Panel, source Nasal Swab     RVP - Adenovirus Not Detected     Comment: Detects Serotypes B and E. Detection of Serotype C may   be limited. If Adenovirus infection is suspected and a   Not Detected result is returned the sample should be   re-tested for Adenovirus using an independent method  (e.g. [employer] Adenovirus Quantitative Real-Time  PCR test.          Enterovirus Not Detected     Comment: Cross-reactivity has been observed between certain Rhinovirus  strains and the Enterovirus assay.          Human Bocavirus Not Detected     Human Coronavirus Not Detected     Comment: The Human Coronavirus assay detects Human coronavirus types  229E, OC43,NL63 and HKU1.          RVP - Human Metapneumovirus (hMPV) Not Detected     RVP - Influenza A Not Detected     Influenza A - [id number] Not Detected     RVP - Influenza B Not Detected     Parainfluenza Not Detected     Respiratory Syncytial VirusVirus (RSV) A Positive (A)     Comment: The  Respiratory Syncytial Viral assay detects types A and B,  however it does not distinguish between the two.          RVP - Rhinovirus Not Detected     Comment: Cross-Reactivity has been observed between certain   Rhinovirus strains and the Enterovirus assay.  Target Enriched Mulitplex Polymerase Chain Reaction (TEM-PCR)  allows for the detection of multiple pathogens out of a single  reaction.  This test was developed and its performance   characteristics determined by [employer].  It has not   been cleared or approved by the U.[place].Food and Drug Administration.  Results should be used in conjunction with clinical findings,   and should not form the sole basis for a diagnosis or treatment  decision.  TEM-PCR is a licensed technology of [employer].         Narrative:       Receiving Lab:->[facility]    Culture, Respiratory with Gram Stain [[id number]] Collected:  03/13/18 1441    Order Status:  Completed Specimen:  Respiratory from Sputum, Expectorated Updated:  03/15/18 1237     Respiratory Culture Normal respiratory hank     Gram Stain (Respiratory) <10 epithelial cells per low power field.     Gram Stain (Respiratory) Rare WBC's     Gram Stain (Respiratory) Moderate Gram positive cocci     Gram Stain (Respiratory) Many Gram negative rods    Fungus culture [[id number]] Collected:  03/14/18 1137    Order Status:  Completed Specimen:  Bronchial Wash from Amniotic Fluid Updated:  03/15/18 1124     Fungus (Mycology) Culture Culture in progress    Stool culture **CANNOT BE ORDERED STAT** [[id number]] Collected:  03/11/18 1923    Order Status:  Completed Specimen:  Stool from Stool Updated:  03/14/18 1549     Stool Culture No enteric hank     Stool Culture No Salmonella,Shigella,Vibrio,Campylobacter,Yersinia isolated.    Culture, Fluid  (Aerobic) [[id number]] Collected:  03/14/18 1137    Order Status:  Canceled Specimen:  Bronchial Wash from Amniotic Fluid Updated:  03/14/18 1416    Cryptococcal antigen  [[id number]] Collected:  03/14/18 0425    Order Status:  Completed Specimen:  Blood from Blood Updated:  03/14/18 1217     Cryptococcal Ag, Blood Negative    Culture, Respiratory with Gram Stain [[id number]] Collected:  03/13/18 1701    Order Status:  Completed Specimen:  Respiratory from Sputum, Expectorated Updated:  03/13/18 2224     Respiratory Culture Specimen inadequate - culture not performed     Gram Stain (Respiratory) >10epis/lfp and <than many WBC's     Gram Stain (Respiratory) Predominance of oropharyngeal hank. Please recollect.    Clostridium difficile EIA [[id number]] Collected:  03/11/18 1923    Order Status:  Completed Specimen:  Stool from Stool Updated:  03/11/18 2355     C. diff Antigen Negative     C difficile Toxins A+B, EIA Negative     Comment: Testing not recommended for children <24 months old.       Stool culture [[id number]]     Order Status:  Completed Specimen:  Stool from Stool     Stool culture [[id number]]     Order Status:  Canceled Specimen:  Stool from Stool     E. coli 0157 antigen [[id number]] Collected:  03/11/18 1923    Order Status:  Canceled Specimen:  Stool from Stool Updated:  03/11/18 2001          I have reviewed all pertinent labs within the past 24 hours.    Estimated Creatinine Clearance: 110.2 mL/min (based on SCr of 0.8 mg/dL).    Diagnostic Results:  I have reviewed all pertinent imaging results/findings within the past 24 hours.

## 2018-03-18 NOTE — PROGRESS NOTES
"[facility]  Endocrinology  Progress Note    Admit Date: 3/11/2018     Reason for Consult: abnormal TFTs    Surgical Procedure and Date: s/p heart transplant 2014     Diabetes diagnosis year: 7yo (T1DM)     Home Diabetes Medications:    Levemir 15u qHS  Novolog 4u AC     How often checking glucose at home? 4 times daily   BG readings on regimen: 150-200s  Hypoglycemia on the regimen?  Yes, rarely - treats appropriately (light snack, glucose tab)  Missed doses on regimen?  No        Diabetes Complications include:     Hyperglycemia, Hypoglycemia  and Diabetic chronic kidney disease          Complicating diabetes co morbidities:   N/A     HPI:   Patient is a 42 y.o. male with a diagnosis of T1DM, HTN, hypothyroidism, s/p heart transplant.  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 He required serial paracentesis and thoracentesis until he underwent a VATS with pleurX catheter placement on 1/11/2018 and removed 2/7/18.       Presented to hospital secondary to diarrhea and cough.  Upon initial labs, TSH was decreased (0.101) and free T4 1.33.  Endocrinology consulted to assist in management of these labs.  He was last seen in clinic by Kamala Vázquez NP 11/2017.   Previous hospitalization, endocrine consulted for same problem.  During that visit, recommended decreasing LT4 to 125mcg daily. Unfortunately, patient was discharged on previous dose of 150mcg daily.     Interval HPI:   Overnight events: none  Eating:   NPO  Nausea: No  Hypoglycemia and intervention: No  Fever: No  TPN and/or TF: No  If yes, type of TF/TPN and rate: n/a    BP (!) 114/56 (BP Location: Right arm, Patient Position: Lying)   Pulse (!) 117   Temp 98.2 °F (36.8 °C) (Core Esophageal)   Resp (!) 23   Ht 5' 7" (1.702 m)   Wt 76.3 kg (168 lb 3.4 oz)   SpO2 (!) 94%   BMI 26.35 kg/m²       Labs Reviewed and Include  "     Recent Labs  Lab 03/18/18  0411   *  157*   CALCIUM 8.3*  8.3*   ALBUMIN 2.3*  2.3*   PROT 5.8*     144   K 5.0  5.0   CO2 21*  21*     103   BUN 8  8   CREATININE 0.8  0.8   ALKPHOS 187*   ALT 33   *   BILITOT 2.1*     Lab Results   Component Value Date    WBC 4.10 03/18/2018    HGB 7.2 (L) 03/18/2018    HCT 22.8 (L) 03/18/2018    MCV 90 03/18/2018    PLT 91 (L) 03/18/2018       Recent Labs  Lab 03/12/18  0443   TSH 0.101*   FREET4 1.33     Lab Results   Component Value Date    HGBA1C 6.9 (H) 02/11/2018       Nutritional status:   Body mass index is 26.35 kg/m².  Lab Results   Component Value Date    ALBUMIN 2.3 (L) 03/18/2018    ALBUMIN 2.3 (L) 03/18/2018    ALBUMIN 2.3 (L) 03/17/2018     Lab Results   Component Value Date    PREALBUMIN 5 (L) 03/15/2018    PREALBUMIN 18 (L) 03/24/2015    PREALBUMIN 19 (L) 12/17/2014       Estimated Creatinine Clearance: 110.2 mL/min (based on SCr of 0.8 mg/dL).    Accu-Checks  Recent Labs      03/17/18   1351  03/17/18   1546  03/17/18   1731  03/17/18   1953  03/17/18   2226  03/17/18   2350  03/18/18   0218  03/18/18   0417  03/18/18   0836  03/18/18   0952   POCTGLUCOSE  143*  150*  157*  145*  170*  145*  122*  159*  180*  214*       Current Medications and/or Treatments Impacting Glycemic Control  Immunotherapy:  Immunosuppressants     None        Steroids:   Hormones     Start     Stop Route Frequency Ordered    03/17/18 1530  vasopressin (PITRESSIN) 0.2 Units/mL in dextrose 5 % 100 mL infusion      -- IV Continuous 03/17/18 1424    03/15/18 1400  hydrocortisone sodium succinate injection 100 mg      -- IV Every 8 hours 03/15/18 1224        Pressors:    Autonomic Drugs     Start     Stop Route Frequency Ordered    03/14/18 1045  norepinephrine 16 mg in dextrose 5 % 250 mL infusion     Question Answer Comment   Titrate by: (in mcg/kg/min) 0.02    Titrate interval: (in minutes) 2    Titrate to maintain: (MAP or SBP) MAP    Greater than:  (in mmHg) 60    Maximum dose: (in mcg/kg/min) 3        -- IV Continuous 03/14/18 0937        Hyperglycemia/Diabetes Medications: Antihyperglycemics     Start     Stop Route Frequency Ordered    03/14/18 1630  insulin regular (Humulin R) 100 Units in sodium chloride 0.9% 100 mL infusion     Question:  Insulin Rate Adjustment (DO NOT MODIFY ANSWER)  Answer:  \\[email or web address]\epic\Images\Pharmacy\InsulinInfusions\InsulinRegAdj [id number]    -- IV Continuous 03/14/18 1530          ASSESSMENT and PLAN    Type 1 diabetes mellitus with stage 3 chronic kidney disease      BG goal 140-180  Lab Results   Component Value Date    HGBA1C 6.9 (H) 02/11/2018     continue intensive insulin gtt at 0.5u/hr, insulin gtt cannot be discontinued as he is a type 1 diabetic  bg monitoring q2hr  Recheck c-peptide low,anti-insulin Ab pending    Discharge Recommendations:  TBD.        Hypothyroidism due to Hashimoto's thyroiditis    Abnormal TFTs upon admit.  Unclear if secondary to illness, but has been seen before in the past (during previous hospitalization).    Continue LT4 ng 125mcg daily.  Repeat TFTs in 4 weeks.          Heart transplanted    Per transplant  Avoid hypoglycemia              Ankur Keith MD  Endocrinology  [facility]

## 2018-03-18 NOTE — PROGRESS NOTES
[facility]  Pulmonology  Progress Note    Patient Name: Lv Crocker  MRN: [id number]  Admission Date: 3/11/2018  Hospital Length of Stay: 6 days  Code Status: Full Code  Attending Provider: Jose A Lloyd MD  Primary Care Provider: Philippe Mohr MD   Principal Problem: Acute respiratory failure with hypoxia    Subjective:     Interval History: No acute events overnight. On Fentanyl 50 mcg and Precedex. Continues to have higher peak pressures with plateau pressures around 30. Hgb dropped acutely to 5.6 with transfusion given.    Objective:     Vital Signs (Most Recent):  Temp: 97.3 °F (36.3 °C) (03/18/18 1500)  Pulse: (!) 114 (03/18/18 1500)  Resp: (!) 23 (03/18/18 0345)  BP: 113/62 (03/18/18 1500)  SpO2: 98 % (03/18/18 1500) Vital Signs (24h Range):  Temp:  [97.3 °F (36.3 °C)-99.7 °F (37.6 °C)] 97.3 °F (36.3 °C)  Pulse:  [101-128] 114  Resp:  [22-24] 23  SpO2:  [94 %-100 %] 98 %  BP: (104-116)/(49-62) 113/62  Arterial Line BP: ()/(37-55) 119/55     Weight: 76.3 kg (168 lb 3.4 oz)  Body mass index is 26.35 kg/m².    Intake/Output Summary (Last 24 hours) at 03/18/18 1613  Last data filed at 03/18/18 1500   Gross per 24 hour   Intake          7652.16 ml   Output             5499 ml   Net          2153.16 ml     Physical Exam   Constitutional: He is oriented to person, place, and time. He appears well-developed and well-nourished.   HENT:   Head: Normocephalic and atraumatic.   Eyes: Conjunctivae are normal.   Cardiovascular: Normal rate, regular rhythm and normal heart sounds.    Pulmonary/Chest: Effort normal. He has no wheezes. He has no rales.   Intubated, bronchial breath sounds on the R, clear on the L   Abdominal: Soft. Bowel sounds are normal. He exhibits no distension. There is no tenderness.   Musculoskeletal: He exhibits no edema.   Neurological: He is alert and oriented to person, place, and time.   Skin: Skin is warm and dry.   Nursing note and vitals  reviewed.    Vents:  Vent Mode: A/C (03/18/18 1333)  Ventilator Initiated: Yes (03/14/18 0932)  Set Rate: 21 bmp (03/18/18 1333)  Vt Set: 400 mL (03/18/18 1333)  Pressure Support: 0 cmH20 (03/18/18 1333)  PEEP/CPAP: 5 cmH20 (03/18/18 1333)  Oxygen Concentration (%): 45 (03/18/18 1500)  Peak Airway Pressure: 39 cmH2O (03/18/18 1333)  Plateau Pressure: 32 cmH20 (03/18/18 1333)  Total Ve: 8.96 mL (03/18/18 1333)  F/VT Ratio<105 (RSBI): (!) 0 (03/17/18 0740)    Lines/Drains/Airways     Central Venous Catheter Line                 Trialysis (Dialysis) Catheter 03/14/18 0813 right internal jugular 4 days          Drain                 NG/OG Tube 03/14/18 1300 [facility] mouth 4 days          Airway                 Airway - Non-Surgical 03/14/18 0930 Endotracheal Tube 4 days          Arterial Line                 Arterial Line 03/14/18 1600 Right Femoral 4 days          Peripheral Intravenous Line                 Peripheral IV - Single Lumen 03/13/18 1600 Left Antecubital 5 days         Peripheral IV - Single Lumen 03/14/18 1736 Right Forearm 3 days         Midline Catheter Insertion/Assessment  - Single Lumen 03/17/18 1454 Left cephalic vein (lateral side of arm) 18g x 8cm 1 day              Significant Labs:    CBC/Anemia Profile:    Recent Labs  Lab 03/17/18  2352 03/18/18  0411 03/18/18  0839   WBC 4.12 3.57* 4.10   HGB 6.7* 7.5* 7.2*   HCT 20.5* 23.6* 22.8*   * 102* 91*   MCV 91 90 90   RDW 15.1* 15.8* 16.6*     Chemistries:    Recent Labs  Lab 03/17/18  0414  03/17/18  1351 03/17/18  2231 03/18/18  0411 03/18/18  1356     145  145  145  < > 144 145 144  144 144   K 4.1  4.1  4.1  4.1  4.1  4.1  4.1  < > 4.4 5.0 5.0  5.0 4.0     102  102  102  < > 104 104 103  103 103   CO2 24  24  24  24  < > 21* 21* 21*  21* 20*   BUN 6  6  6  6  < > 6 9 8  8 12   CREATININE 0.7  0.7  0.7  0.7  < > 0.7 0.9 0.8  0.8 1.2   CALCIUM 9.7  9.7  9.7  9.7  < > 8.9 8.5* 8.3*  8.3* 8.1*    ALBUMIN 2.5*  2.5*  2.5*  --  2.4* 2.3* 2.3*  2.3* 2.3*   PROT 6.8  --   --  6.2 5.8*  --    BILITOT 2.1*  --   --  2.1* 2.1*  --    ALKPHOS 200*  --   --  196* 187*  --    ALT 13  --   --  21 33  --    AST 51*  --   --  67* 101*  --    MG 2.1  --  1.7 2.0 1.8  1.8  --    PHOS 2.4*  2.4*  --  2.7 3.1 3.2 3.8   < > = values in this interval not displayed.    All pertinent labs within the past 24 hours have been reviewed.    Significant Imaging:  I have reviewed and interpreted all pertinent imaging results/findings within the past 24 hours.    Assessment/Plan:     * Acute respiratory failure with hypoxia    This is a case of hypoxic/hypercapnic respiratory failure in the setting of heart transplant (11/2014), restrictive cardiomyopathy, low compliance of the R lung, and (+) RSV. Patient was admitted on 3/11 and intubated on 3/14. CXR and CT chest from 3/14 show significant pulmonary edema - oxygenation has improved since that time with aggressive volume removal on CRRT. With patient's history of nausea/vomiting, there was also concern for aspiration PNA.     Today's CXR continues to show improvement. Patient's vent settings at PEEP 5 and FiO2 of 50%. Patient is sedated on Fentanyl 50 mcg and Precedex at this time. He has been off of Versed drip for 48 hours. Peak pressures are elevated with plateau pressures around 30. He is currently on Vanc, Zosyn, and Azithromycin.    -Bronch culture data pending. (+) RSV. Agree with continuing ABX at this time while waiting for culture data.  -Continue volume removal with CRRT for (-) net. CXR in the a.m.  -Continue Fentanyl and Precedex for sedation. Do not start benzodiazepines.  -Patient continues to have high peak/plateau pressures. Considering whether this is related to low compliance of his R lung. Will continue to monitor. He does have episodes of abdominal contraction and bearing down that causes his peak pressures to rise into the 50's-60's with agitation. Blood  pressures and oxygenation drop when this happens.  -Patient's hgb dropped acutely requiring transfusions. Will wait to do SBT until acute issues resolve.        Thank you for involving us in the care of this patient. We will continue to follow along. Please call with any questions.    Abbie Coleman MD  [facility] Fellow, PGY 4  [facility]  Pager: [phone]

## 2018-03-18 NOTE — ASSESSMENT & PLAN NOTE
43yo man w/a history of DM1, Hashimoto's thyroiditis, and ICM (s/p OHT 11/18/2014, CMV D+/R+, steroid induction, on maintenance tacro/MMF/pred; c/b early hemorrhagic tamponade requiring washout, delayed closure, and pericardial window and subsequent AF w/RVR, and CACHORRO; late course c/b ABMR 4/2015 s/p rituxan, PLEX, and IVIG; subsequent C.diff/CMV reactivation, restrictive cardiomyopathy with recurrent pleural effusions s/p VATS/pleurex catheter 1/2018 with removal 2/2018 and ascites requiring repeated LVP, and CKD) who was admitted on 3/11/2018 with ~3wks of worsening profuse non-bloody diarrhea, associated cramping abdominal pain, N/V, and a week of acute on chronic cough, SOB, hypoxia requiring intubation, and fevers and was found to have multifocal pneumonia on CT chest due to RSV-A pneumonia with suspected superimposed bacterial pneumonia. He remains critically ill with ongoing pressor requirement, hypoxic RF, CRRT requirement, and minimal hepatitis.    - will continue empiric vanc/zosyn/azithromycin, posaconazole, and IV GCV induction for broad coverage -- can begin to taper bacterial coverage if BAL cx are negative at 48-72h - still no growth   - would continue oral ribavirin (while intubated avoiding inhaled) + IVIG + stress dose steroids per lung transplant protocol for severe RSV pneumonia  - await pending tests including: BAL cultures, urine legionella, Quantiferon, fungitell, Strongyloides ab (of note, O&P negative), adenovirus EIA, and [place] stool PCR panel  - still pending   - flex sig for samples to exclude CMV invasive disease or other pathogens would be helpful but patient is not stable enough for this procedure currently; will empirically treat CMV for now and reassess later - seems more stable today - perhaps this could be done on 3-19-18     Update 3-18-18   Concerning that FiO2 has gone up slighty.  CXR may appear more ARDS- like.  Pressor supported still and maybe increase in this.  No temp and  normal WBC  - no new culture data and nothing from BAL.  Concerning picture but broadly covering for bacteria and fungi/mold and atypicals   Plan:   1. BC x 2 as you have done   2. ID will UA and urine culture if indicated but seriously doubt this is the issue   3. Would not add additional abx at this time since a drug resistant organisms should have been apparent in the BAL - overall feel this is the viral process and we have to continue with the excellent supportive care     [facility] [phone]

## 2018-03-18 NOTE — PLAN OF CARE
Problem: Patient Care Overview  Goal: Plan of Care Review  Outcome: Ongoing (interventions implemented as appropriate)  No acute events during shift; precedex and fentanyl for sedation; vent settings decreased (AC rate 21  FIO2 45% peep 5) by pulmonary, pt tolerating, but when aroused/turned pt biting ETT and increased pressures; CRRT UF increased to 400; CVP 10-14; vaso @ 0.04 and levo (currently 0.15) titrated to keep MAPs > 60; HR decreased to low 100s from 120s following down titration of levo; q2h accuchecks with insulin drip (currently 1.5); no urine output-osullivan DC; OG to low, intermittent suction, 400 cc output; ongoing IV abx, contact precautions maintained; CHG bath and linens changed; spouse and family at bedside

## 2018-03-18 NOTE — PROGRESS NOTES
[facility]  Infectious Disease  Progress Note    Patient Name: Lv Crocker  MRN: [id number]  Admission Date: 3/11/2018  Length of Stay: 6 days  Attending Physician: Jose A Lloyd MD  Primary Care Provider: Philippe Mohr MD    Isolation Status: Contact  Assessment/Plan:      * Acute respiratory failure with hypoxia    43yo man w/a history of DM1, Hashimoto's thyroiditis, and ICM (s/p OHT 11/18/2014, CMV D+/R+, steroid induction, on maintenance tacro/MMF/pred; c/b early hemorrhagic tamponade requiring washout, delayed closure, and pericardial window and subsequent AF w/RVR, and CACHORRO; late course c/b ABMR 4/2015 s/p rituxan, PLEX, and IVIG; subsequent C.diff/CMV reactivation, restrictive cardiomyopathy with recurrent pleural effusions s/p VATS/pleurex catheter 1/2018 with removal 2/2018 and ascites requiring repeated LVP, and CKD) who was admitted on 3/11/2018 with ~3wks of worsening profuse non-bloody diarrhea, associated cramping abdominal pain, N/V, and a week of acute on chronic cough, SOB, hypoxia requiring intubation, and fevers and was found to have multifocal pneumonia on CT chest due to RSV-A pneumonia with suspected superimposed bacterial pneumonia. He remains critically ill with ongoing pressor requirement, hypoxic RF, CRRT requirement, and minimal hepatitis.    - will continue empiric vanc/zosyn/azithromycin, posaconazole, and IV GCV induction for broad coverage -- can begin to taper bacterial coverage if BAL cx are negative at 48-72h - still no growth   - would continue oral ribavirin (while intubated avoiding inhaled) + IVIG + stress dose steroids per lung transplant protocol for severe RSV pneumonia  - await pending tests including: BAL cultures, urine legionella, Quantiferon, fungitell, Strongyloides ab (of note, O&P negative), adenovirus EIA, and [place] stool PCR panel  - still pending   - flex sig for samples to exclude CMV invasive disease or other pathogens would be  helpful but patient is not stable enough for this procedure currently; will empirically treat CMV for now and reassess later - seems more stable today - perhaps this could be done on 3-19-18     Update 3-18-18   Concerning that FiO2 has gone up slighty.  CXR may appear more ARDS- like.  Pressor supported still and maybe increase in this.  No temp and normal WBC  - no new culture data and nothing from BAL.  Concerning picture but broadly covering for bacteria and fungi/mold and atypicals   Plan:   1. BC x 2 as you have done   2. ID will UA and urine culture if indicated but seriously doubt this is the issue   3. Would not add additional abx at this time since a drug resistant organisms should have been apparent in the BAL - overall feel this is the viral process and we have to continue with the excellent supportive care     Mariam [phone]             Anticipated Disposition:     Thank you for your consult. I will follow-up with patient. Please contact us if you have any additional questions.    Leonardo Becerra MD  Infectious Disease  [facility]    Subjective:     Principal Problem:Acute respiratory failure with hypoxia    HPI: 43yo man w/a history of DM1, Hashimoto's thyroiditis, and ICM (s/p OHT 11/18/2014, CMV D+/R+, steroid induction, on maintenance tacro/MMF/pred; c/b early hemorrhagic tamponade requiring washout, delayed closure, and pericardial window and subsequent AF w/RVR, and CACHORRO; late course c/b ABMR 4/2015 s/p rituxan, PLEX, and IVIG; subsequent C.diff/CMV reactivation, restrictive cardiomyopathy with recurrent pleural effusions s/p VATS/pleurex catheter 1/2018 with removal 2/2018 and ascites requiring repeated LVP, and CKD) who was admitted on 3/11/2018 with ~3wks of worsening profuse non-bloody diarrhea, associated cramping abdominal pain, N/V, and a week of acute on chronic cough, SOB, hypoxia requiring intubation, and fevers and was found to have multifocal pneumonia on CT  chest due to RSV-A pneumonia with suspected superimposed bacterial pneumonia. He remains critically ill with ongoing pressor requirement, hypoxic RF, CRRT requirement, and minimal hepatitis.  Interval History: Intubated and sedated  - more O2 needed today   Review of Systems   Unable to perform ROS: Intubated   nstitutional: He appears well-developed and well-nourished. No distress.   Chronically ill appearing. Intubated.   HENT:   Head: Atraumatic.   Mouth/Throat: Oropharynx is clear and moist. No oropharyngeal exudate.   Eyes: Conjunctivae are normal. Pupils are equal, round, and reactive to light. No scleral icterus.   Neck: Neck supple.   Cardiovascular: Normal rate and regular rhythm.  Exam reveals no friction rub.    No murmur heard.  Pulmonary/Chest: No respiratory distress. He has no wheezes. He has rales. He exhibits no tenderness.   Intubated.   Abdominal: Soft. Bowel sounds are normal. He exhibits no distension. There is no tenderness. There is no rebound and no guarding.   Musculoskeletal: Normal range of motion. He exhibits no edema.   Lymphadenopathy:     He has no cervical adenopathy.   Neurological:   Intubated/sedated.   Skin: No rash noted. No erythema. IV lines OK   Objective:     Vital Signs (Most Recent):  Temp: 97.7 °F (36.5 °C) (03/18/18 1300)  Pulse: (!) 120 (03/18/18 1333)  Resp: (!) 23 (03/18/18 0345)  BP: (!) 105/56 (03/18/18 1200)  SpO2: 97 % (03/18/18 1333) Vital Signs (24h Range):  Temp:  [96.6 °F (35.9 °C)-99.7 °F (37.6 °C)] 97.7 °F (36.5 °C)  Pulse:  [101-128] 120  Resp:  [22-24] 23  SpO2:  [94 %-100 %] 97 %  BP: (104-116)/(49-56) 105/56  Arterial Line BP: ()/(37-58) 104/49     Weight: 76.3 kg (168 lb 3.4 oz)  Body mass index is 26.35 kg/m².    Estimated Creatinine Clearance: 110.2 mL/min (based on SCr of 0.8 mg/dL).    Physical Exam    nstitutional: He appears well-developed and well-nourished. No distress.   Chronically ill appearing. Intubated.   HENT:   Head: Atraumatic.    Mouth/Throat: Oropharynx is clear and moist. No oropharyngeal exudate.   Eyes: Conjunctivae are normal. Pupils are equal, round, and reactive to light. No scleral icterus.   Neck: Neck supple.   Cardiovascular: Normal rate and regular rhythm.  Exam reveals no friction rub.    No murmur heard.  Pulmonary/Chest: No respiratory distress. He has no wheezes. He has rales. He exhibits no tenderness.   Intubated.   Abdominal: Soft. Bowel sounds are normal. He exhibits no distension. There is no tenderness. There is no rebound and no guarding.   Musculoskeletal: Normal range of motion. He exhibits no edema.   Lymphadenopathy:     He has no cervical adenopathy.   Neurological:   Intubated/sedated.   Skin: No rash noted. No erythema. IV lines OK     Significant Labs: All pertinent labs within the past 24 hours have been reviewed.    Significant Imaging: I have reviewed all pertinent imaging results/findings within the past 24 hours.

## 2018-03-18 NOTE — PLAN OF CARE
Problem: Patient Care Overview  Goal: Plan of Care Review  Outcome: Ongoing (interventions implemented as appropriate)  Plan of care updated and reviewed with wife at bedside. Pt remains intubated and sedated. Levo gtt titrated per MD order.  Pt turned q2hr. CRRT continued. TF held d/t high residuals. See flow sheet for full documentation.

## 2018-03-18 NOTE — NURSING
Spoke with NP Aleena, increase UF to pull more fluid off; also notified of residuals 450 cc, with no audible bowel sounds, will order KUB

## 2018-03-19 PROBLEM — D61.818 PANCYTOPENIA: Status: ACTIVE | Noted: 2018-01-01

## 2018-03-19 PROBLEM — D69.6 THROMBOCYTOPENIA: Status: ACTIVE | Noted: 2018-01-01

## 2018-03-19 NOTE — ASSESSMENT & PLAN NOTE
-Continue Levo for MAP 60.  -Continue stress dose hydrocortisone 100mg TID.  -Appreciate ID assistance.   -Continue empiric vanc/zosyn/azithromycin for broad bacterial coverage.   -Aspergill Ag neg from BAL. Wonder if we can stop posaconazole.  -CMV PCR also neg. Wonder if ganciclovir can be stopped as well(will touch base with ID).  - Urine histo/blasto antigens neg, crypto antigen neg, urine legionella neg, and Quantiferon pending.  -Tube feeds as tolerated.

## 2018-03-19 NOTE — NURSING
Dr Wong notified of the following lab values: K 3.5, Mag 1.9, Haptoglobin 163, , H/H 6.6/20.4. Orders to replace electrolytes.

## 2018-03-19 NOTE — PROGRESS NOTES
Catherine Ferguson consulted about patient Ribavirin capsule with administration instructions to be given orally-- patient has OGT & intubated; [facility] stating do not open capsule. Akash Kern, Pharm D consulted per Phyllis Pharm D-- Catherine Kern stating okay to administer capsule per OGT if proper PPE implemented. Catherine Ferguson stated hold 0900 dose since patient due to receive another dose at 2100.

## 2018-03-19 NOTE — PLAN OF CARE
Problem: Patient Care Overview  Goal: Plan of Care Review  Outcome: Ongoing (interventions implemented as appropriate)  Neuro: Sedated and intubated (PRECEDEX/FENTANYL); appears comfortable       Pulmonary:Breath sounds clear & diminished; ETT 24 @ lips, A/C, R21, PEEP 5, O2 45%, . Pt sat >99% throughout shift      Cardiovascular: LEVO titrated throughout shift to maintain MAP >60. Able to wean to 0.04mcg/kg/min. VASO (NON-TITRATING) infusing as well. SVO2 = 49 this morning; significantly decreased from yesterday (70). SVO2 drawn twice to ensure accuracy (first draw 38, repeat = 49). 1 unit PRBC infused this AM for critical H/H. Temp monitored via esophageal temp probe; warming blanket required intermittently. Patient NSR to ST. CVP 8-10. Right Fem Ignacio intact, pulsatile blood flow.        Gastrointestinal: OG tube at 60 cm @ LIS; 200cc output throughout shift. No BM overnight, stool softener administered        Genitourinary: Anuric; Patient on CRRT on SLED of  (goal)     Endocrine: Insulin gtt; CBG q 2 hours      Integumentary/Other:      Infusions: Fentanyl, Precedex, Vaso, Levo, Insulin          Bed in low, locked position, call light within reach, side rails up x3. Will continue to monitor.

## 2018-03-19 NOTE — NURSING
Dr Wong notified of pt's Hemoglobin 6.6- acute drop last PM requiring blood transfusion. Pt currently taking systemic Ribavirin, with known side effect of Hemolytic anemia. Dr Wong ordered LD/Haptoglobin.

## 2018-03-19 NOTE — PROGRESS NOTES
[facility]  Heart Transplant  Progress Note    Patient Name: Lv Crocker  MRN: [id number]  Admission Date: 3/11/2018  Hospital Length of Stay: 7 days  Attending Physician: Jose A Lloyd MD  Primary Care Provider: Philippe Mohr MD  Principal Problem:Acute respiratory failure with hypoxia    Subjective:     Interval History: No issues overnight. Blood counts dropping.     Continuous Infusions:   sodium chloride 0.9% 200 mL/hr at 03/18/18 1140    sodium chloride 0.9%      dexmedetomidine (PRECEDEX) infusion 0.6 mcg/kg/hr (03/19/18 1056)    fentanyl 5 mL/hr at 03/19/18 1000    insulin (HUMAN R) infusion (adults) 0.1 Units/hr (03/19/18 1015)    norepinephrine bitartrate-D5W 0.04 mcg/kg/min (03/19/18 1015)    vasopressin (PITRESSIN) infusion 0.04 Units/min (03/19/18 1000)     Scheduled Meds:   azithromycin (ZITHROMAX) 500 mg IVPB  500 mg Intravenous Q24H    chlorhexidine  15 mL Mouth/Throat BID    docusate  100 mg Per NG tube BID    escitalopram oxalate  20 mg Per NG tube Daily    famotidine (PF)  20 mg Intravenous Daily    ganciclovir (CYTOVENE) IVPB  200 mg Intravenous Q24H    hydrocortisone sodium succinate  100 mg Intravenous Q8H    levothyroxine  125 mcg Per NG tube Before breakfast    piperacillin-tazobactam (ZOSYN) IVPB  4.5 g Intravenous Q8H    [START ON 3/20/2018] polyethylene glycol  17 g Per NG tube Daily    posaconazole (NOXAFIL) 300 mg in dextrose 5% IVPB  300 mg Intravenous Daily    ribavirin  600 mg Oral BID    sodium chloride 0.9%  3 mL Intravenous Q8H    vancomycin (VANCOCIN) IVPB  1,000 mg Intravenous Q24H     PRN Meds:sodium chloride, sodium chloride, acetaminophen, dextrose 50%, dextrose 50%, glucagon (human recombinant), glucose, glucose, k phos di & mono-sod phos mono    Review of patient's allergies indicates:   Allergen Reactions    No known drug allergies      Objective:     Vital Signs (Most Recent):  Temp: 97.2 °F (36.2 °C) (03/19/18  1100)  Pulse: 74 (03/19/18 1100)  Resp: (!) 23 (03/18/18 0345)  BP: 107/68 (03/19/18 1100)  SpO2: 100 % (03/19/18 1100) Vital Signs (24h Range):  Temp:  [96.1 °F (35.6 °C)-99 °F (37.2 °C)] 97.2 °F (36.2 °C)  Pulse:  [] 74  SpO2:  [96 %-100 %] 100 %  BP: ()/(54-76) 107/68  Arterial Line BP: ()/(43-66) 113/58     Patient Vitals for the past 72 hrs (Last 3 readings):   Weight   03/19/18 0913 76.3 kg (168 lb 3.4 oz)   03/17/18 0630 76.3 kg (168 lb 3.4 oz)     Body mass index is 26.35 kg/m².      Intake/Output Summary (Last 24 hours) at 03/19/18 1126  Last data filed at 03/19/18 1015   Gross per 24 hour   Intake          6624.39 ml   Output             9081 ml   Net         -2456.61 ml          Telemetry: ST    Physical Exam   Constitutional: He appears well-developed and well-nourished.   Appears ill   HENT:   Head: Normocephalic and atraumatic.   Eyes: Conjunctivae are normal. Pupils are equal, round, and reactive to light.   Neck: No thyromegaly present.   RIJ trialysis   Cardiovascular: Regular rhythm.    Tachycardic   Pulmonary/Chest:   Intubated and ventilated  Breath sounds are slightly decreased right base. Essentially CTA bilaterally   Abdominal: Soft.   No bowel sounds. + ascites   Musculoskeletal:   1-2+ BUE edema; no MITZY; no cyanosis   Neurological:   Arouses and becomes agitated with sedation breaks   Skin: Skin is warm and dry. Capillary refill takes less than 2 seconds. There is pallor.       Significant Labs:  CBC:    Recent Labs  Lab 03/18/18  0839 03/18/18  2144 03/19/18  0247   WBC 4.10 3.35* 2.26*   RBC 2.54* 2.30* 2.23*   HGB 7.2* 6.6* 6.3*   HCT 22.8* 20.4* 19.7*   PLT 91* 62* 55*   MCV 90 89 88   MCH 28.3 28.7 28.3   MCHC 31.6* 32.4 32.0     BNP:    Recent Labs  Lab 03/13/18  0815   *     CMP:    Recent Labs  Lab 03/17/18  2231 03/18/18  0411 03/18/18  1356 03/18/18  2144 03/19/18  0247 03/19/18  0811   * 157*  157* 209* 148* 125*  125*  --    CALCIUM 8.5* 8.3*   8.3* 8.1* 8.3* 7.8*  7.8*  --    ALBUMIN 2.3* 2.3*  2.3* 2.3* 2.2* 2.1*  2.1*  --    PROT 6.2 5.8*  --   --  5.0*  --     144  144 144 144 144  144  --    K 5.0 5.0  5.0 4.0 3.5 3.6  3.6 4.0   CO2 21* 21*  21* 20* 24 24  24  --     103  103 103 104 104  104  --    BUN 9 8  8 12 9 8  8  --    CREATININE 0.9 0.8  0.8 1.2 0.9 0.8  0.8  --    ALKPHOS 196* 187*  --   --  155*  --    ALT 21 33  --   --  42  --    AST 67* 101*  --   --  123*  --    BILITOT 2.1* 2.1*  --   --  2.1*  --       Coagulation:     Recent Labs  Lab 03/18/18  0929   INR 2.1*     LDH:    Recent Labs  Lab 03/18/18  2222   *     Microbiology:  Microbiology Results (last 7 days)     Procedure Component Value Units Date/Time    Blood culture [[id number]] Collected:  03/18/18 0618    Order Status:  Completed Specimen:  Blood from Peripheral, Hand, Left Updated:  03/19/18 1056     Blood Culture, Routine Gram stain aer bottle: Gram positive cocci in clusters resembling Staph      Blood Culture, Routine Results called to and read back by:Omid Cody RN 03/19/2018  10:55    Blood culture [[id number]] Collected:  03/18/18 0748    Order Status:  Completed Specimen:  Blood from Peripheral, Hand, Right Updated:  03/19/18 1012     Blood Culture, Routine No Growth to date     Blood Culture, Routine No Growth to date    Blood culture [[id number]] Collected:  03/13/18 1637    Order Status:  Completed Specimen:  Blood from Peripheral, Antecubital, Left Updated:  03/18/18 2012     Blood Culture, Routine No growth after 5 days.    Blood culture [[id number]] Collected:  03/13/18 1701    Order Status:  Completed Specimen:  Blood from Peripheral, Forearm, Left Updated:  03/18/18 2012     Blood Culture, Routine No growth after 5 days.    Blood culture [[id number]]     Order Status:  Canceled Specimen:  Blood     Blood culture [[id number]]     Order Status:  Canceled Specimen:  Blood     Culture, Respiratory with Gram Stain [[id number]] Collected:   03/15/18 1541    Order Status:  Completed Specimen:  Respiratory from Sputum Updated:  03/17/18 0914     Respiratory Culture No growth     Gram Stain (Respiratory) <10 epithelial cells per low power field.     Gram Stain (Respiratory) Rare WBC's      Gram Stain (Respiratory) No organisms seen    Culture, Respiratory with Gram Stain [[id number]] Collected:  03/14/18 1137    Order Status:  Completed Specimen:  Respiratory from BAL, TRIPP Updated:  03/16/18 1046     Respiratory Culture No growth     Gram Stain (Respiratory) No WBC's     Gram Stain (Respiratory) No organisms seen    AFB Culture & Smear [[id number]] Collected:  03/14/18 1137    Order Status:  Completed Specimen:  Bronchial Wash from Amniotic Fluid Updated:  03/15/18 2127     AFB Culture & Smear Culture in progress     AFB CULTURE STAIN No acid fast bacilli seen.    Respiratory Viral Panel by PCR [facility]; Nasal Swab [[id number]]  (Abnormal) Collected:  03/13/18 1733    Order Status:  Completed Specimen:  Respiratory Updated:  03/15/18 1302     Respiratory Virus Panel, source Nasal Swab     RVP - Adenovirus Not Detected     Comment: Detects Serotypes B and E. Detection of Serotype C may   be limited. If Adenovirus infection is suspected and a   Not Detected result is returned the sample should be   re-tested for Adenovirus using an independent method  (e.g. [employer] Adenovirus Quantitative Real-Time  PCR test.          Enterovirus Not Detected     Comment: Cross-reactivity has been observed between certain Rhinovirus  strains and the Enterovirus assay.          Human Bocavirus Not Detected     Human Coronavirus Not Detected     Comment: The Human Coronavirus assay detects Human coronavirus types  229E, OC43,NL63 and HKU1.          RVP - Human Metapneumovirus (hMPV) Not Detected     RVP - Influenza A Not Detected     Influenza A - O3N1-81 Not Detected     RVP - Influenza B Not Detected     Parainfluenza Not Detected     Respiratory Syncytial VirusVirus  (RSV) A Positive (A)     Comment: The Respiratory Syncytial Viral assay detects types A and B,  however it does not distinguish between the two.          RVP - Rhinovirus Not Detected     Comment: Cross-Reactivity has been observed between certain   Rhinovirus strains and the Enterovirus assay.  Target Enriched Mulitplex Polymerase Chain Reaction (TEM-PCR)  allows for the detection of multiple pathogens out of a single  reaction.  This test was developed and its performance   characteristics determined by [employer].  It has not   been cleared or approved by the U.[place].Food and Drug Administration.  Results should be used in conjunction with clinical findings,   and should not form the sole basis for a diagnosis or treatment  decision.  TEM-PCR is a licensed technology of [employer].         Narrative:       Receiving Lab:->[facility]    Culture, Respiratory with Gram Stain [[id number]] Collected:  03/13/18 1441    Order Status:  Completed Specimen:  Respiratory from Sputum, Expectorated Updated:  03/15/18 1237     Respiratory Culture Normal respiratory hank     Gram Stain (Respiratory) <10 epithelial cells per low power field.     Gram Stain (Respiratory) Rare WBC's     Gram Stain (Respiratory) Moderate Gram positive cocci     Gram Stain (Respiratory) Many Gram negative rods    Fungus culture [[id number]] Collected:  03/14/18 1137    Order Status:  Completed Specimen:  Bronchial Wash from Amniotic Fluid Updated:  03/15/18 1124     Fungus (Mycology) Culture Culture in progress    Stool culture **CANNOT BE ORDERED STAT** [[id number]] Collected:  03/11/18 1923    Order Status:  Completed Specimen:  Stool from Stool Updated:  03/14/18 1549     Stool Culture No enteric hank     Stool Culture No Salmonella,Shigella,Vibrio,Campylobacter,Yersinia isolated.    Culture, Fluid  (Aerobic) [[id number]] Collected:  03/14/18 1137    Order Status:  Canceled Specimen:  Bronchial Wash from Amniotic Fluid Updated:   03/14/18 1416    Cryptococcal antigen [[id number]] Collected:  03/14/18 0425    Order Status:  Completed Specimen:  Blood from Blood Updated:  03/14/18 1217     Cryptococcal Ag, Blood Negative    Culture, Respiratory with Gram Stain [[id number]] Collected:  03/13/18 1701    Order Status:  Completed Specimen:  Respiratory from Sputum, Expectorated Updated:  03/13/18 2224     Respiratory Culture Specimen inadequate - culture not performed     Gram Stain (Respiratory) >10epis/lfp and <than many WBC's     Gram Stain (Respiratory) Predominance of oropharyngeal hank. Please recollect.          I have reviewed all pertinent labs within the past 24 hours.    Estimated Creatinine Clearance: 110.2 mL/min (based on SCr of 0.8 mg/dL).    Diagnostic Results:  I have reviewed all pertinent imaging results/findings within the past 24 hours.    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 Most recently has had ongoing issues with his lungs, had gotten 2 thoracenteses, after which he underwent VATS 01/19/18 followed by pleurex catheter placement and effusion drainage 01/11/18, subsequently had it removed 02/07/18 after drainage had decreased despite multiple attempts to drain it. Has been having significant coughing fits that result in him being near-syncopal at times, although difficult to say if he has passed out or not- patient most recently was admitted to the hospital for increased AGUILAR, coughing and pre-syncope 02/11-02/14/18 [facility].   Patient was started on antibiotics for suspected bacterial infection, with some diarrhea, bronchitis, and possible pneumonia. Ct chest showed some pleural fluid collection and after talking with Dr. James, we arranged for him to receive a diagnostic tap with IR, from which the fluid collection  demonstrated no growth or significant findings at all really. Cell counts do not appear to have been sent but will recheck. Patient states since his hospital stay, yesterday had a significant coughing fit again, after which he nearly passed out, is being seen in clinic today for this. Denies any cardiopulmonary complaints, no leg swelling, has some abdominal swelling, which is moderate for him. Denies significant shortness of breath with mild exertion, had 6MWT yesterday to see if he qualified for home O2, and his O2 sats actually improved after recovery from where he started initially. He and his wife admit he is in bed most days, not very active.     Mr. Crocker presented to the ED 3/11/18 with approximately 3 weeks of worsening diarrhea and 2-3 days of sinus pressure, drainage, and worsened cough.  He notes that he had a coughing fit last night and passed out, coughed throughout most of the night.  This also happened on 2/25/18.  He last saw Dr Ferreira in clinic on 2/20/18 where he was taken off myfortic and switched to cellcept (he hadn't been on cellcept because of leukopenia when they tried post-transplant).  Though his diarrhea had improved after his last discharge, he states it has increased now to 15x/day, clear/yellow/green, no fevers, no exacerbating foods per say.  He was taken back off the cellcept and placed back on myfortic this past week and has not noticed immediate change in diarrhea. He also reports his baseline coughing fits havent improved and are minimally responsive to tessalon pearls.  Came on last night, he lost consciousness during a fit once which is relatively normal for him, and had two more during HPI.  He notes no sick contacts but does state he's had some URI symptoms as above.  His baseline vitals are usually -120 and BP 90s/60s-110s/70s.  He reports a history of intermittent diarrhea - did have Cdiff many years ago but this smells different.  No abdominal pain, no nausea, no  vomiting.  No blood in diarrhea.  Appears last c-scope in 2015 with no evidence of infection or inflammatory processes.  He does report IBS which is different that this.       * Acute respiratory failure with hypoxia    -S/P Bronch and intubation 3/14. Cultures ngtd.   -Appreciate PUL/ID Cx. RCV positive. Continue Ribavarin/IVIG/stress dose steroids per lung transplant protocol for severe RSV pneumonia.   -Continue empiric vanc/zosyn/azithromycin for broad bacterial coverage.   -Aspergill Ag neg from BAL. Wonder if we can stop posaconazole .  -CMV PCR also neg. Wonder if ganciclovir can be stopped as well(will touch base with ID).  - Urine histo/blasto antigens neg, crypto antigen neg, urine legionella neg, and Quantiferon pending.  -Pulmonology managing vent        Septic shock    -Continue Levo for MAP 60.  -Continue stress dose hydrocortisone 100mg TID.  -Appreciate ID assistance.   -Continue empiric vanc/zosyn/azithromycin for broad bacterial coverage.   -Aspergill Ag neg from BAL. Wonder if we can stop posaconazole.  -CMV PCR also neg. Wonder if ganciclovir can be stopped as well(will touch base with ID).  - Urine histo/blasto antigens neg, crypto antigen neg, urine legionella neg, and Quantiferon pending.  -Tube feeds as tolerated.        Restrictive cardiomyopathy    -S/P thoracentesis X 2, followed by VATS/pleurex cath placement (January 2018) with removal of pleurex (February 2018) and 3rd thoracentesis 2/14/18 with no significant findings on fluid removal. Moderate right pleural effusion stable by CXR 3/11/18 and chest CT 3/13/18  -Last paracentesis was in January. Abd US 3/12/18 confirmed ongoing ascites (not tense at all). Will consider getting paracentesis this admit  -Continue Levophed for MAP 60. Vaso 0.04u/h.        Acute renal failure superimposed on stage 4 chronic kidney disease    - Appreciate Neph Cx. Continue CRRT as tolerated.         Heart transplanted    - TTE 3/12/18 showed LVEF 50-55% (but  no substantial change when compared to previous echo), RV normal and IVC 8  - Had -ve DSE on 11/30/17  - Current immunosuppression: Pred/Prograf/MMF on hold. Continue hydrocortisone 100mg Q8H.         Leukopenia    - WBC/plts/H&H trending down. Likely due to posaconazole?  May require neupogen if trends down with current treatment.  - Stopped SQ heparin. Checking HIT panel/HILDA.        Ileus    -Will try tube feeds again today.  - Continue bowel program for now        Type 1 diabetes mellitus with stage 3 chronic kidney disease    - Appreciate Endocrine's recs        Hypothyroidism due to Hashimoto's thyroiditis    - TSH low at 0.101 with normal FT4. Appreciate Endocrine's recs        Critical Care Time: 45 minutes     Critical care was time spent personally by me on the following activities: development of treatment plan with patient or surrogate and bedside caregivers, discussions with consultants, evaluation of patient's response to treatment, examination of patient, ordering and performing treatments and interventions, ordering and review of laboratory studies, ordering and review of radiographic studies, pulse oximetry, re-evaluation of patient's condition. This critical care time did not overlap with that of any other provider or involve time for any procedures.        Tim Mao, NP  Heart Transplant  [facility]

## 2018-03-19 NOTE — ASSESSMENT & PLAN NOTE
BG goal 140-180  Continue intensive insulin gtt q2hr checks while remains intubated.   Once stable will switch to transition.   Do not suspend gtt >1 hr, pt is T1DM.    Low c peptide with glc 164. Treat as type 1.   Neg MODE 2013, neg islet cell ab on this admission.   Cannot order insulin ab while on insulin, and ZnT8 unavailable in labs.

## 2018-03-19 NOTE — SUBJECTIVE & OBJECTIVE
Interval History:   Remained on SLED over the weekend. Minimal UOP. UF increased yesterday for ARDS-like findings on CXR. No events overnight. Received 1 unit pRBC. CVP 8-10. Net negative 2L yesterday. Hourly intake 40cc/hr this morning with levo, vaso, fentanyl, and insulin. SLED with UF 400cc/hr. CXR worse this AM.     Review of patient's allergies indicates:   Allergen Reactions    No known drug allergies      Current Facility-Administered Medications   Medication Frequency    0.9%  NaCl infusion (CRRT USE ONLY) Continuous    0.9%  NaCl infusion (for blood administration) Q24H PRN    acetaminophen tablet 325 mg Q4H PRN    azithromycin (ZITHROMAX) 500 mg in sodium chloride 0.9% 250 mL IVPB Q24H    chlorhexidine 0.12 % solution 15 mL BID    dexmedetomidine (PRECEDEX) 400mcg/100mL 0.9% NaCL infusion Continuous    dextrose 50% injection 12.5 g PRN    dextrose 50% injection 25 g PRN    docusate 50 mg/5 mL liquid 100 mg BID    escitalopram oxalate tablet 20 mg Daily    famotidine (PF) injection 20 mg Daily    fentaNYL 2500 mcg in 0.9% sodium chloride 250 mL infusion premix (titrating) Continuous    ganciclovir (CYTOVENE) 200 mg in sodium chloride 0.9% 100 mL IVPB Q24H    glucagon (human recombinant) injection 1 mg PRN    glucose chewable tablet 16 g PRN    glucose chewable tablet 24 g PRN    hydrocortisone sodium succinate injection 100 mg Q8H    insulin regular (Humulin R) 100 Units in sodium chloride 0.9% 100 mL infusion Continuous    k phos di & mono-sod phos mono 250 mg tablet 2 tablet Q4H PRN    levothyroxine tablet 125 mcg Before breakfast    norepinephrine 16 mg in dextrose 5 % 250 mL infusion Continuous    piperacillin-tazobactam 4.5 g in sodium chloride 0.9% 100 mL IVPB (ready to mix system) Q8H    polyethylene glycol packet 17 g Daily    posaconazole 300 mg in dextrose 5 % 150 mL infusion Daily    ribavirin capsule 600 mg BID    sodium chloride 0.9% flush 3 mL Q8H    vancomycin 1  gram/250 mL in sodium chloride 0.9% IVPB 1 g Q24H    vasopressin (PITRESSIN) 0.2 Units/mL in dextrose 5 % 100 mL infusion Continuous       Objective:     Vital Signs (Most Recent):  Temp: 97.2 °F (36.2 °C) (03/19/18 0913)  Pulse: 75 (03/19/18 0913)  Resp: (!) 23 (03/18/18 0345)  BP: 126/76 (03/19/18 0800)  SpO2: 100 % (03/19/18 0913)  O2 Device (Oxygen Therapy): ventilator (03/19/18 0913) Vital Signs (24h Range):  Temp:  [96.1 °F (35.6 °C)-99 °F (37.2 °C)] 97.2 °F (36.2 °C)  Pulse:  [] 75  SpO2:  [94 %-100 %] 100 %  BP: ()/(54-76) 126/76  Arterial Line BP: ()/(43-66) 124/60     Weight: 76.3 kg (168 lb 3.4 oz) (03/19/18 0913)  Body mass index is 26.35 kg/m².  Body surface area is 1.9 meters squared.    I/O last 3 completed shifts:  In: 77754.9 [I.V.:8946.9; Blood:1063; NG/GT:95; IV Piggyback:1000]  Out: 12325 [Urine:5; Drains:600; Other:45163]    Physical Exam   Constitutional: He appears well-developed and well-nourished. He is sedated and intubated.   HENT:   Head: Normocephalic and atraumatic.   Neck: Neck supple. No thyromegaly present.   Cardiovascular: Normal rate and regular rhythm.    Pulmonary/Chest: He is intubated. He has no wheezes. He has no rales.   Abdominal: Soft. He exhibits no distension.   Musculoskeletal: He exhibits edema. He exhibits no deformity.   Skin: Skin is warm and dry. He is not diaphoretic.       Significant Labs:  ABGs:   Recent Labs  Lab 03/19/18  0756   PH 7.405   PCO2 41.2   HCO3 25.8   POCSATURATED 94*   BE 1     CBC:   Recent Labs  Lab 03/19/18 0247   WBC 2.26*   RBC 2.23*   HGB 6.3*   HCT 19.7*   PLT 55*   MCV 88   MCH 28.3   MCHC 32.0     CMP:   Recent Labs  Lab 03/19/18 0247 03/19/18 0811   *  125*  --    CALCIUM 7.8*  7.8*  --    ALBUMIN 2.1*  2.1*  --    PROT 5.0*  --      144  --    K 3.6  3.6 4.0   CO2 24  24  --      104  --    BUN 8  8  --    CREATININE 0.8  0.8  --    ALKPHOS 155*  --    ALT 42  --    *  --     BILITOT 2.1*  --      All labs within the past 24 hours have been reviewed.     Significant Imaging:  Labs: Reviewed  X-Ray: Reviewed

## 2018-03-19 NOTE — CONSULTS
[facility]  Hematology/Oncology  Consult Note    Patient Name: Lv Crocker  MRN: [id number]  Admission Date: 3/11/2018  Hospital Length of Stay: 7 days  Code Status: Full Code   Attending Provider: Jose A Lloyd MD  Consulting Provider: Clovis Mcbride MD  Primary Care Physician: Philippe Mohr MD  Principal Problem:Acute respiratory failure with hypoxia    Inpatient consult to Hematology  Consult performed by: CLOVIS MCBRIDE  Consult ordered by: JULIA CHRISTINE        Subjective:     HPI:  Pt is a 43 yo M with h/o hashimoto's thyroidits, DM1 on insulin, s/p OHTX 11/14, CKD who initially presented to the hospital with 3 week h/o worsening diarrhea, cough which led to a syncopal event.  After being hospitalized, the patient underwent an EGD and Colonoscopy on 3/13/18 to assess the cause of diarrhea.  The patient subsequently developed hypercapnic and hypoxic respiratory failure and was intubated on 3/14/18.  The patient currently is being treated for respiratory failure, septic shock with ARDS and RSV infection, and renal failure.  The patient has been persistently anemic since his heart transplant in 2014 and has had worsening anemia during admission.  The patient has developed thrombocytopenia starting 3/17/18 during his hospitalization.  Currently the patient is intubated and unable;e to provide ROS.             Oncology Treatment Plan:   [No treatment plan]    Medications:  Continuous Infusions:   sodium chloride 0.9% 200 mL/hr at 03/19/18 1145    dexmedetomidine (PRECEDEX) infusion 0.6 mcg/kg/hr (03/19/18 1600)    fentanyl 5 mL/hr at 03/19/18 1600    insulin (HUMAN R) infusion (adults) 0.5 Units/hr (03/19/18 1628)    norepinephrine bitartrate-D5W 0.06 mcg/kg/min (03/19/18 1628)    vasopressin (PITRESSIN) infusion 0.04 Units/min (03/19/18 1628)     Scheduled Meds:   azithromycin (ZITHROMAX) 500 mg IVPB  500 mg Intravenous Q24H    chlorhexidine  15 mL Mouth/Throat BID     docusate  100 mg Per NG tube BID    escitalopram oxalate  20 mg Per NG tube Daily    famotidine (PF)  20 mg Intravenous Daily    ganciclovir (CYTOVENE) IVPB  200 mg Intravenous Q24H    hydrocortisone sodium succinate  100 mg Intravenous Q8H    levothyroxine  125 mcg Per NG tube Before breakfast    piperacillin-tazobactam (ZOSYN) IVPB  4.5 g Intravenous Q8H    [START ON 3/20/2018] polyethylene glycol  17 g Per NG tube Daily    posaconazole (NOXAFIL) 300 mg in dextrose 5% IVPB  300 mg Intravenous Daily    ribavirin  600 mg Oral BID    sodium chloride 0.9%  3 mL Intravenous Q8H    vancomycin (VANCOCIN) IVPB  1,000 mg Intravenous Q24H     PRN Meds:sodium chloride, sodium chloride, sodium chloride, acetaminophen, dextrose 50%, dextrose 50%, glucagon (human recombinant), glucose, glucose, k phos di & mono-sod phos mono, magnesium sulfate IVPB     Review of Systems   Unable to perform ROS: Intubated     Objective:     Vital Signs (Most Recent):  Temp: 98 °F (36.7 °C) (03/19/18 1611)  Pulse: 75 (03/19/18 1600)  Resp: (!) 23 (03/18/18 0345)  BP: 96/60 (03/19/18 1200)  SpO2: 100 % (03/19/18 1600) Vital Signs (24h Range):  Temp:  [96.1 °F (35.6 °C)-99 °F (37.2 °C)] 98 °F (36.7 °C)  Pulse:  [] 75  SpO2:  [95 %-100 %] 100 %  BP: ()/(54-76) 96/60  Arterial Line BP: ()/(43-66) 108/53     Weight: 76.3 kg (168 lb 3.4 oz)  Body mass index is 26.35 kg/m².  Body surface area is 1.9 meters squared.      Intake/Output Summary (Last 24 hours) at 03/19/18 1649  Last data filed at 03/19/18 1628   Gross per 24 hour   Intake          7480.84 ml   Output             9951 ml   Net         -2470.16 ml       Physical Exam   Constitutional: He appears well-developed and well-nourished.   HENT:   Head: Normocephalic and atraumatic.   ET tube in place   Eyes: Right eye exhibits no discharge. Left eye exhibits no discharge. No scleral icterus.   Cardiovascular: Normal rate, regular rhythm, normal heart sounds and  intact distal pulses.  Exam reveals no gallop and no friction rub.    No murmur heard.  Pulmonary/Chest: Breath sounds normal. No respiratory distress. He has no wheezes. He has no rales. He exhibits no tenderness.   Auscultated anteriorly.   Abdominal: Soft. Bowel sounds are normal. He exhibits no distension and no mass. There is no tenderness. There is no rebound.   Musculoskeletal: Normal range of motion. He exhibits edema. He exhibits no tenderness.   Neurological:   Sedated   Skin: Skin is warm and dry. No rash noted. No erythema.       Significant Labs:   Recent Results (from the past 24 hour(s))   POCT glucose    Collection Time: 03/18/18  6:05 PM   Result Value Ref Range    POCT Glucose 190 (H) 70 - 110 mg/dL   POCT glucose    Collection Time: 03/18/18  7:59 PM   Result Value Ref Range    POCT Glucose 157 (H) 70 - 110 mg/dL   Renal function panel    Collection Time: 03/18/18  9:44 PM   Result Value Ref Range    Glucose 148 (H) 70 - 110 mg/dL    Sodium 144 136 - 145 mmol/L    Potassium 3.5 3.5 - 5.1 mmol/L    Chloride 104 95 - 110 mmol/L    CO2 24 23 - 29 mmol/L    BUN, Bld 9 6 - 20 mg/dL    Calcium 8.3 (L) 8.7 - 10.5 mg/dL    Creatinine 0.9 0.5 - 1.4 mg/dL    Albumin 2.2 (L) 3.5 - 5.2 g/dL    Phosphorus 1.7 (L) 2.7 - 4.5 mg/dL    eGFR if African American >60.0 >60 mL/min/1.73 m^2    eGFR if non African American >60.0 >60 mL/min/1.73 m^2    Anion Gap 16 8 - 16 mmol/L   CBC auto differential    Collection Time: 03/18/18  9:44 PM   Result Value Ref Range    WBC 3.35 (L) 3.90 - 12.70 K/uL    RBC 2.30 (L) 4.60 - 6.20 M/uL    Hemoglobin 6.6 (L) 14.0 - 18.0 g/dL    Hematocrit 20.4 (L) 40.0 - 54.0 %    MCV 89 82 - 98 fL    MCH 28.7 27.0 - 31.0 pg    MCHC 32.4 32.0 - 36.0 g/dL    RDW 17.3 (H) 11.5 - 14.5 %    Platelets 62 (L) 150 - 350 K/uL    MPV 9.5 9.2 - 12.9 fL    Immature Granulocytes CANCELED 0.0 - 0.5 %    Immature Grans (Abs) CANCELED 0.00 - 0.04 K/uL    Lymph # CANCELED 1.0 - 4.8 K/uL    Mono # CANCELED 0.3  - 1.0 K/uL    Eos # CANCELED 0.0 - 0.5 K/uL    Baso # CANCELED 0.00 - 0.20 K/uL    nRBC 0 0 /100 WBC    Gran% 83.0 (H) 38.0 - 73.0 %    Lymph% 8.0 (L) 18.0 - 48.0 %    Mono% 5.0 4.0 - 15.0 %    Eosinophil% 0.0 0.0 - 8.0 %    Basophil% 0.0 0.0 - 1.9 %    Bands 3.0 %    Metamyelocytes 1.0 %    Platelet Estimate Decreased (A)     Aniso Slight     Poik Slight     Poly Occasional     Hypo Occasional     Ovalocytes Occasional     Target Cells Occasional     Tear Drop Cells Occasional     Zulay Cells Occasional     Schistocytes Present     Fragmented Cells Occasional     Differential Method Manual    POCT glucose    Collection Time: 03/18/18  9:48 PM   Result Value Ref Range    POCT Glucose 155 (H) 70 - 110 mg/dL   Magnesium    Collection Time: 03/18/18 10:22 PM   Result Value Ref Range    Magnesium 1.9 1.6 - 2.6 mg/dL   Lactate dehydrogenase    Collection Time: 03/18/18 10:22 PM   Result Value Ref Range     (H) 110 - 260 U/L   Haptoglobin    Collection Time: 03/18/18 10:22 PM   Result Value Ref Range    Haptoglobin 163 30 - 250 mg/dL   POCT glucose    Collection Time: 03/19/18 12:20 AM   Result Value Ref Range    POCT Glucose 142 (H) 70 - 110 mg/dL   CBC auto differential    Collection Time: 03/19/18  2:47 AM   Result Value Ref Range    WBC 2.26 (L) 3.90 - 12.70 K/uL    RBC 2.23 (L) 4.60 - 6.20 M/uL    Hemoglobin 6.3 (L) 14.0 - 18.0 g/dL    Hematocrit 19.7 (LL) 40.0 - 54.0 %    MCV 88 82 - 98 fL    MCH 28.3 27.0 - 31.0 pg    MCHC 32.0 32.0 - 36.0 g/dL    RDW 17.2 (H) 11.5 - 14.5 %    Platelets 55 (L) 150 - 350 K/uL    MPV 11.4 9.2 - 12.9 fL    Immature Granulocytes CANCELED 0.0 - 0.5 %    Immature Grans (Abs) CANCELED 0.00 - 0.04 K/uL    Lymph # Test Not Performed 1.0 - 4.8 K/uL    Mono # Test Not Performed 0.3 - 1.0 K/uL    Eos # Test Not Performed 0.0 - 0.5 K/uL    Baso # Test Not Performed 0.00 - 0.20 K/uL    nRBC 0 0 /100 WBC    Gran% 82.0 (H) 38.0 - 73.0 %    Lymph% 10.0 (L) 18.0 - 48.0 %    Mono% 7.0 4.0 - 15.0  %    Eosinophil% 0.0 0.0 - 8.0 %    Basophil% 0.0 0.0 - 1.9 %    Metamyelocytes 1.0 %    Platelet Estimate Decreased (A)     Aniso Slight     Poik Slight     Poly Occasional     Hypo Occasional     Ovalocytes Occasional     Target Cells Occasional     [place] Cells Occasional     Schistocytes Present     Basophilic Stippling Occasional     Large/Giant Platelets Present     Fragmented Cells Occasional     Differential Method Manual    Comprehensive metabolic panel    Collection Time: 03/19/18  2:47 AM   Result Value Ref Range    Sodium 144 136 - 145 mmol/L    Potassium 3.6 3.5 - 5.1 mmol/L    Chloride 104 95 - 110 mmol/L    CO2 24 23 - 29 mmol/L    Glucose 125 (H) 70 - 110 mg/dL    BUN, Bld 8 6 - 20 mg/dL    Creatinine 0.8 0.5 - 1.4 mg/dL    Calcium 7.8 (L) 8.7 - 10.5 mg/dL    Total Protein 5.0 (L) 6.0 - 8.4 g/dL    Albumin 2.1 (L) 3.5 - 5.2 g/dL    Total Bilirubin 2.1 (H) 0.1 - 1.0 mg/dL    Alkaline Phosphatase 155 (H) 55 - 135 U/L     (H) 10 - 40 U/L    ALT 42 10 - 44 U/L    Anion Gap 16 8 - 16 mmol/L    eGFR if African American >60.0 >60 mL/min/1.73 m^2    eGFR if non African American >60.0 >60 mL/min/1.73 m^2   Magnesium    Collection Time: 03/19/18  2:47 AM   Result Value Ref Range    Magnesium 1.8 1.6 - 2.6 mg/dL   Tacrolimus level    Collection Time: 03/19/18  2:47 AM   Result Value Ref Range    Tacrolimus Lvl 3.1 (L) 5.0 - 15.0 ng/mL   Renal function panel    Collection Time: 03/19/18  2:47 AM   Result Value Ref Range    Glucose 125 (H) 70 - 110 mg/dL    Sodium 144 136 - 145 mmol/L    Potassium 3.6 3.5 - 5.1 mmol/L    Chloride 104 95 - 110 mmol/L    CO2 24 23 - 29 mmol/L    BUN, Bld 8 6 - 20 mg/dL    Calcium 7.8 (L) 8.7 - 10.5 mg/dL    Creatinine 0.8 0.5 - 1.4 mg/dL    Albumin 2.1 (L) 3.5 - 5.2 g/dL    Phosphorus 1.7 (L) 2.7 - 4.5 mg/dL    eGFR if African American >60.0 >60 mL/min/1.73 m^2    eGFR if non African American >60.0 >60 mL/min/1.73 m^2    Anion Gap 16 8 - 16 mmol/L   POCT glucose    Collection  Time: 03/19/18  3:16 AM   Result Value Ref Range    POCT Glucose 125 (H) 70 - 110 mg/dL   ISTAT PROCEDURE    Collection Time: 03/19/18  3:23 AM   Result Value Ref Range    POC PH 7.346 (L) 7.35 - 7.45    POC PCO2 47.9 (H) 35 - 45 mmHg    POC PO2 24 (LL) 40 - 60 mmHg    POC HCO3 26.2 24 - 28 mmol/L    POC BE 1 -2 to 2 mmol/L    POC SATURATED O2 38 (L) 95 - 100 %    POC TCO2 28 24 - 29 mmol/L    Sample VENOUS     Site Other     Allens Test N/A     DelSys Adult Vent     Mode AC/PRVC    ISTAT PROCEDURE    Collection Time: 03/19/18  3:27 AM   Result Value Ref Range    POC PH 7.439 7.35 - 7.45    POC PCO2 40.8 35 - 45 mmHg    POC PO2 112 (H) 80 - 100 mmHg    POC HCO3 27.6 24 - 28 mmol/L    POC BE 3 -2 to 2 mmol/L    POC SATURATED O2 99 95 - 100 %    POC TCO2 29 (H) 23 - 27 mmol/L    Sample ARTERIAL     Site [place]     Allens Test N/A     DelSys Adult Vent     Mode AC/PRVC    ISTAT PROCEDURE    Collection Time: 03/19/18  3:31 AM   Result Value Ref Range    POC PH 7.398 7.35 - 7.45    POC PCO2 48.3 (H) 35 - 45 mmHg    POC PO2 27 (LL) 40 - 60 mmHg    POC HCO3 29.8 (H) 24 - 28 mmol/L    POC BE 5 -2 to 2 mmol/L    POC SATURATED O2 49 (L) 95 - 100 %    POC TCO2 31 (H) 24 - 29 mmol/L    Sample VENOUS     Site Other     Allens Test N/A     DelSys Adult Vent     Mode AC/PRVC    POCT glucose    Collection Time: 03/19/18  5:51 AM   Result Value Ref Range    POCT Glucose 151 (H) 70 - 110 mg/dL   Fibrinogen    Collection Time: 03/19/18  5:57 AM   Result Value Ref Range    Fibrinogen <70 (AA) 182 - 366 mg/dL   ISTAT PROCEDURE    Collection Time: 03/19/18  7:56 AM   Result Value Ref Range    POC PH 7.405 7.35 - 7.45    POC PCO2 41.2 35 - 45 mmHg    POC PO2 69 (L) 80 - 100 mmHg    POC HCO3 25.8 24 - 28 mmol/L    POC BE 1 -2 to 2 mmol/L    POC SATURATED O2 94 (L) 95 - 100 %    POC TCO2 27 23 - 27 mmol/L    Rate 21     Sample ARTERIAL     Site [place]     Allens Test N/A     DelSys Adult Vent     Mode AC/PRVC     Vt 400     PEEP 5      FiO2 40    POCT glucose    Collection Time: 03/19/18  8:02 AM   Result Value Ref Range    POCT Glucose 120 (H) 70 - 110 mg/dL   Magnesium    Collection Time: 03/19/18  8:11 AM   Result Value Ref Range    Magnesium 1.5 (L) 1.6 - 2.6 mg/dL   Potassium    Collection Time: 03/19/18  8:11 AM   Result Value Ref Range    Potassium 4.0 3.5 - 5.1 mmol/L   Heparin-induced platelet antibody    Collection Time: 03/19/18 10:06 AM   Result Value Ref Range    Heparin Induced Thrombocytopenia SEE COMMENT (AA)    POCT glucose    Collection Time: 03/19/18 10:06 AM   Result Value Ref Range    POCT Glucose 131 (H) 70 - 110 mg/dL   POCT glucose    Collection Time: 03/19/18 11:17 AM   Result Value Ref Range    POCT Glucose 152 (H) 70 - 110 mg/dL   POCT glucose    Collection Time: 03/19/18 12:18 PM   Result Value Ref Range    POCT Glucose 153 (H) 70 - 110 mg/dL   POCT glucose    Collection Time: 03/19/18  2:11 PM   Result Value Ref Range    POCT Glucose 151 (H) 70 - 110 mg/dL   Renal function panel    Collection Time: 03/19/18  2:13 PM   Result Value Ref Range    Glucose 151 (H) 70 - 110 mg/dL    Sodium 146 (H) 136 - 145 mmol/L    Potassium 4.8 3.5 - 5.1 mmol/L    Chloride 105 95 - 110 mmol/L    CO2 24 23 - 29 mmol/L    BUN, Bld 6 6 - 20 mg/dL    Calcium 8.0 (L) 8.7 - 10.5 mg/dL    Creatinine 0.7 0.5 - 1.4 mg/dL    Albumin 2.3 (L) 3.5 - 5.2 g/dL    Phosphorus 1.4 (L) 2.7 - 4.5 mg/dL    eGFR if African American >60.0 >60 mL/min/1.73 m^2    eGFR if non African American >60.0 >60 mL/min/1.73 m^2    Anion Gap 17 (H) 8 - 16 mmol/L   Fibrinogen    Collection Time: 03/19/18  2:13 PM   Result Value Ref Range    Fibrinogen <70 (AA) 182 - 366 mg/dL   Protime-INR    Collection Time: 03/19/18  2:13 PM   Result Value Ref Range    Prothrombin Time 17.3 (H) 9.0 - 12.5 sec    INR 1.8 (H) 0.8 - 1.2   CBC auto differential    Collection Time: 03/19/18  2:13 PM   Result Value Ref Range    WBC 3.69 (L) 3.90 - 12.70 K/uL    RBC 3.34 (L) 4.60 - 6.20 M/uL     Hemoglobin 9.7 (L) 14.0 - 18.0 g/dL    Hematocrit 29.5 (L) 40.0 - 54.0 %    MCV 88 82 - 98 fL    MCH 29.0 27.0 - 31.0 pg    MCHC 32.9 32.0 - 36.0 g/dL    RDW 16.2 (H) 11.5 - 14.5 %    Platelets 69 (L) 150 - 350 K/uL    MPV 10.4 9.2 - 12.9 fL    Immature Granulocytes CANCELED 0.0 - 0.5 %    Immature Grans (Abs) CANCELED 0.00 - 0.04 K/uL    Lymph # CANCELED 1.0 - 4.8 K/uL    Mono # CANCELED 0.3 - 1.0 K/uL    Eos # CANCELED 0.0 - 0.5 K/uL    Baso # CANCELED 0.00 - 0.20 K/uL    nRBC 0 0 /100 WBC    Gran% 86.0 (H) 38.0 - 73.0 %    Lymph% 7.0 (L) 18.0 - 48.0 %    Mono% 7.0 4.0 - 15.0 %    Eosinophil% 0.0 0.0 - 8.0 %    Basophil% 0.0 0.0 - 1.9 %    Aniso Slight     Poik Slight     Poly Occasional     Ovalocytes Occasional     Tear Drop Cells Occasional     Zulay Cells Occasional     Fragmented Cells Occasional     Differential Method Manual    POCT glucose    Collection Time: 03/19/18  4:26 PM   Result Value Ref Range    POCT Glucose 182 (H) 70 - 110 mg/dL         Diagnostic Results:  CXR:  Endotracheal tube tip lies at the level of the apex of the aortic arch, well above the shivani.  No significant interval change in the appearance of the chest since 03/18/2018 is observed.  Small amount of pleural fluid on the right is stable in volume.  No pneumothorax.    Assessment/Plan:     Thrombocytopenia    -The patient has had a progressive thrombocytopenia starting 3/17/18.  -The patients peripheral smear was viewed showing shistocytes suggestive of a MAHA.  -Differential for the patients thrombocytopenia include HIT, drug induced (vancomycin, zosyn, ganciclovir, and ribavirin), DIC.  -The patient developed thrombocytopenia 5 days after receiving enoxaparin and has a 4T score of 5 indicating a ~ 14% chance of HIT.  The patients HIT panel was positive with OD of 0.431 indicating a 1-5% chance of having HIT.  Would wait for HILDA before considering anticoagulation with argatroban.  -The patients thrombocytopenia could be drug  induced and would consider stopping ganciclovir if possible as this is the most likely culprit of the potential offending medicaitons.  -The patient has a low fibrinogen, elevated INR, shistocytes on peripheral smear and elevated LDH which all point towards DIC.  The haptoglobin is elevated which goes against hemolytic anemia,; however, haptoglobin is also an acute phase reactant.  As DIC is the higher on the differential would treat as such by treating underlying cause which is likely sepsis.      -Would check fibrinogen, INR and plt count every 8 hours.  -Would give cryoprecipitate to get fibrinogen over 100.  Would give one unit at a time.  -Would give FFP to get the INR below 1.8.  Would give at least two units at a time.  May consider giving the patient Vitamin K given recent use of coumadin.  -Would give a single unit of platelets at a time to get platelets above 50k.          -Discussed with staff.    Thank you for your consult. I will follow-up with patient. Please contact us if you have any additional questions.    Clovis Mcbride MD  Hematology/Oncology  [facility]    Attending Note  I have personally taken the history and examined this patient and agree with the fellow's note as stated above.  Will follow up on additional labs  Agree with supportive management for DIC with transfusions and antibiotics

## 2018-03-19 NOTE — PLAN OF CARE
Problem: Patient Care Overview  Goal: Plan of Care Review  Outcome: Ongoing (interventions implemented as appropriate)  POC reviewed with pt's family.  2 Units FFP, 1 unit PRBC, 1 cryo administered this shift. 2 units PRBC were ordered, only 1 administered because repeat H&H 9.7, 29.5. Tube feeds initiated at 10cc/hr with 50cc residual.  CRRT UF increased to 450.  No BM this shift.  Pt responds to pain.  Was informed that when sedation is weaned pt hemodynamically becomes unstable, sedation not weaned this shift.  Positive blood cultures called to team.  Contact precautions maintained.

## 2018-03-19 NOTE — PROGRESS NOTES
Update:    SW to pt's room to provide support to pt's wife. Pt intubated and sedated. Pt's wife seated at bedside and appropriately tearful. Pt's wife reports some difficulty coping today. SW providing emotional support and counseling. Pt's wife began paperwork to arrange for pt's dog to visit. SW faxed forms to pt's vet. Waiting for vet to return completed forms. Pastor Miguel, from [facility] also visited with pt's wife during SW visit. Pt's wife reprots no further needs from SW at this time. SW providing ongoing psychosocial and counseling support, education, assistance, resources, and discharge planning as indicated. SW continuing to follow and remains available.

## 2018-03-19 NOTE — ASSESSMENT & PLAN NOTE
- baseline sCr 2.6-3.0 consistent with CKD stage IV  - anuric CACHORRO; likely ischemic ATN from prolonged pre-renal state (diarrhea) in setting of prograf renal vasoconstriction; cannot r/o septic ATN or Prograf toxicity  - SLED started 3/14  - remains anuric  - UF increased yesterday; net negative 2L/24h however CXR worse  - continue SLED today; increase UF goal to 350-450cc/hr as tolerated

## 2018-03-19 NOTE — PHYSICIAN QUERY
PT Name: Lv Crocker  MR #: [id number]    Physician Query Form - Hematology Clarification      CDS/: Emilie Hussein RN, CCDS             Contact information: [email or web address]    This form is a permanent document in the medical record.      Query Date: March 19, 2018    By submitting this query, we are merely seeking further clarification of documentation. Please utilize your independent clinical judgment when addressing the question(s) below.    The Medical record contains the following:   Indicators    Supporting Clinical Findings Location in Medical Record   X Anemia, Thrombocytopenia, Neutropenia, Pancytopenia documented Iron deficiency  Anemia  Leukopenia 3/12 gi note  3/17-3/19 prog note   X H & H 8.2/25.1-->5.6/18.3->6.3/19.7 3/12, 3/17, 3/19 lab   X WBC 3.30-->3.43->3.35->2.26 3/12, 3/17, 3/18, 3/19 lab    Neutrophils      Granulocytes     X Platelets 218->99-->62->55 3/12, 3/17, 3/18, 3/19 lab   X Transfusion(s) Transfuse 2 units prbc's 3/19 orders    Treatments:     X Other: WBC/plts/H&H trending down. Likely due to posaconazole? 3/19 prog note     Provider, please specify diagnosis or diagnoses associated with above clinical findings.    [  ] Pancytopenia due to other drug  [ x ] Pancytopenia  [  ] Other Hematological Diagnosis (please specify) ______________________________  [  ] Clinically Undetermined      Please document in your progress notes daily for the duration of treatment, until resolved, and include in your discharge summary.

## 2018-03-19 NOTE — PROGRESS NOTES
"  [facility]  Adult Nutrition  Consult Note    SUMMARY     Recommendations    1. If able to resume enteral nutrition, recommend Glucerna 1.5 @ 50 mL/hr to provide 1800 calories, 99 g of protein, 911 mL fluid.  2. If unable to resume enteral nutrition & parenteral nutrition warranted, recommend 5/15 @ 75 mL/hr + 250 mL 20% lipids to provide 1778 kcals, 90 g of protein, 1800 mL fluid (GIR: 2.46).  3. If able to extubate & advance diet, recommend 2 gram sodium, 2000 kcal ADA diet (texture per SLP).   4. RD to monitor & follow-up.    Goals: Meet % EEN, EPN  Nutrition Goal Status: goal not met  Communication of RD Recs: reviewed with RN    Reason for Assessment    Reason for Assessment: RD follow-up  Diagnosis: other (see comments) (Resp. fx)  Relevant Medical History: Hashimoto's disease, HTN, HLD, DM, CHF, Heart tx (2014)  Interdisciplinary Rounds: did not attend    General Information Comments: Pt remains intubated, sedated. Receiving CRRT. OGT to suction.  Nutrition Discharge Planning: Unable to determine    Nutrition Risk Screen    Nutrition Risk Screen: other (see comments)    Nutrition/Diet History    Patient Reported Diet/Restrictions/Preferences: other (see comments) (HAO)  Do you have any cultural, spiritual, Worship conflicts, given your current situation?: none  Factors Affecting Nutritional Intake: NPO, on mechanical ventilation    Anthropometrics    Temp: 97.2 °F (36.2 °C)  Height Method: Stated  Height: 5' 7" (170.2 cm)  Height (inches): 67 in  Weight Method: Bed Scale  Weight: 76.3 kg (168 lb 3.4 oz)  Weight (lb): 168.21 lb  Ideal Body Weight (IBW), Male: 148 lb  % Ideal Body Weight, Male (lb): 113.66 lb  BMI (Calculated): 26.4  BMI Grade: 25 - 29.9 - overweight  Usual Body Weight (UBW), kg:  (HAO)    Lab/Procedures/Meds    Pertinent Labs Reviewed: reviewed  Pertinent Labs Comments: Gluc 125, A1C 6.9  Pertinent Medications Reviewed: reviewed  Pertinent Medications Comments: " "Precedex, Fentanyl, Insulin, Levophed    Physical Findings/Assessment    Overall Physical Appearance: nourished, on ventilator support  Tubes: orogastric tube, other (see comments) (To suction)  Oral/Mouth Cavity: WDL  Skin: intact    Estimated/Assessed Needs    Weight Used For Calorie Calculations: 81 kg (178 lb 9.2 oz)  Height: 5' 7" (170.2 cm)    Energy Calorie Requirements (kcal): 1745 kcal/d  Energy Need Method: [facility]    Protein Requirements:  g/d (1.2-1.5 g/kg)  Weight Used For Protein Calculations: 81 kg (178 lb 9.2 oz)    Fluid Need Method: other (see comments) (Per MD or 1 mL/kcal)    CHO Requirement: 50% total kcals     Nutrition Prescription Ordered    Current Diet Order: NPO    Nutrition Risk    Level of Risk/Frequency of Follow-up: high     Assessment and Plan    * Acute respiratory failure with hypoxia      Nutrition Problem  Inadequate energy intake    Related to (etiology):   Inability to consume sufficient energy    Signs and Symptoms (as evidenced by):   NPO with no alternate means of nutrition     Nutrition Diagnosis Status:   Continues         Monitor and Evaluation    Food and Nutrient Intake: energy intake, food and beverage intake, enteral nutrition intake, parenteral nutrition intake  Food and Nutrient Adminstration: diet order, enteral and parenteral nutrition administration  Physical Activity and Function: nutrition-related ADLs and IADLs  Anthropometric Measurements: weight, weight change  Biochemical Data, Medical Tests and Procedures: lipid profile, inflammatory profile, glucose/endocrine profile, gastrointestinal profile, electrolyte and renal panel  Nutrition-Focused Physical Findings: overall appearance     Nutrition Follow-Up    RD Follow-up?: Yes  "

## 2018-03-19 NOTE — HPI
Pt is a 43 yo M with h/o hashimoto's thyroidits, DM1 on insulin, s/p OHTX 11/14, CKD who initially presented to the hospital with 3 week h/o worsening diarrhea, cough which led to a syncopal event.  After being hospitalized, the patient underwent an EGD and Colonoscopy on 3/13/18 to assess the cause of diarrhea.  The patient subsequently developed hypercapnic and hypoxic respiratory failure and was intubated on 3/14/18.  The patient currently is being treated for respiratory failure, septic shock with ARDS and RSV infection, and renal failure.  The patient has been persistently anemic since his heart transplant in 2014 and has had worsening anemia during admission.  The patient has developed thrombocytopenia starting 3/17/18 during his hospitalization.  Currently the patient is intubated and unable;e to provide ROS.

## 2018-03-19 NOTE — ASSESSMENT & PLAN NOTE
- WBC/plts/H&H trending down. Likely due to posaconazole?  May require neupogen if trends down with current treatment.  - Stopped SQ heparin. Checking HIT panel/HILDA.

## 2018-03-19 NOTE — PROGRESS NOTES
[facility]  Infectious Disease  Progress Note    Patient Name: Lv Crocker  MRN: [id number]  Admission Date: 3/11/2018  Length of Stay: 7 days  Attending Physician: Jose A Lloyd MD  Primary Care Provider: Philippe Mohr MD    Isolation Status: Contact  Assessment/Plan:      * Acute respiratory failure with hypoxia    43yo man w/a history of DM1, Hashimoto's thyroiditis, and ICM (s/p OHT 11/18/2014, CMV D+/R+, steroid induction, on maintenance tacro/MMF/pred; c/b early hemorrhagic tamponade requiring washout, delayed closure, and pericardial window and subsequent AF w/RVR, and CACHORRO; late course c/b ABMR 4/2015 s/p rituxan, PLEX, and IVIG; subsequent C.diff/CMV reactivation, restrictive cardiomyopathy with recurrent pleural effusions s/p VATS/pleurex catheter 1/2018 with removal 2/2018 and ascites requiring repeated LVP, and CKD) who was admitted on 3/11/2018 with ~3wks of worsening profuse non-bloody diarrhea, associated cramping abdominal pain, N/V, and a week of acute on chronic cough, SOB, hypoxia requiring intubation, and fevers and was found to have multifocal pneumonia on CT chest due to RSV-A pneumonia with suspected superimposed bacterial pneumonia. He remains critically ill with ongoing pressor requirement, hypoxic RF, CRRT requirement, and possible evolving DIC. Will aim to taper antimicrobials today to ensure polypharmacy is not driving his hematologic abnormalities.    - will continue empiric vancomycin for now given GPCC that just resulted from repeat blood cultures -- suspect contaminant but will await speciation  - would continue zosyn for now while awaiting final BAL cx results  - would stop azithromycin as he has completed a >5 day course for possible atypical pneumonia without positive Legionella studies  - may continue empiric posaconazole for now but will transition to per tube to avoid accumulation to cyclodextrin carrier  - would transition IV GCV back to  maintenance dosing as no obvious evidence of CMV disease (no diarrhea since Friday, negative serum/BAL CMV PCR)  - would continue oral ribavirin (while intubated avoiding inhaled) + IVIG + stress dose steroids per lung transplant protocol for severe RSV pneumonia -- given myelosuppression, will aim for 5 day course to minimize toxicity  - await pending tests including: final BAL cultures and Quantiferon             Anticipated Disposition: pending improvement    Thank you for your consult. I will follow-up with patient. Please contact us if you have any additional questions.     Maribell Eric MD  Transplant ID Attending  [id number]    Maribell Eric MD  Infectious Disease  [facility]    Subjective:     Principal Problem:Acute respiratory failure with hypoxia    HPI: 41yo man w/a history of DM1, Hashimoto's thyroiditis, and ICM (s/p OHT 11/18/2014, CMV D+/R+, steroid induction, on maintenance tacro/MMF/pred; c/b early hemorrhagic tamponade requiring washout, delayed closure, and pericardial window and subsequent AF w/RVR, and CACHORRO; late course c/b ABMR 4/2015 s/p rituxan, PLEX, and IVIG; subsequent C.diff/CMV reactivation, restrictive cardiomyopathy with recurrent pleural effusions s/p VATS/pleurex catheter 1/2018 with removal 2/2018 and ascites requiring repeated LVP, and CKD) who was admitted on 3/11/2018 with ~3wks of worsening profuse non-bloody diarrhea, associated cramping abdominal pain, N/V, and a week of acute on chronic cough, SOB, hypoxia requiring intubation, and fevers and was found to have multifocal pneumonia on CT chest due to RSV-A pneumonia with suspected superimposed bacterial pneumonia. He remains critically ill with ongoing pressor requirement, hypoxic RF, CRRT requirement, and minimal hepatitis.  Interval History: Remains intubated on minimal settings. Still on minimal pressors/CRRT. Afebrile. Cultures unremarkable to date. Myelosuppression and possible DIC noted.    Review  of Systems   Unable to perform ROS: Intubated     Objective:     Vital Signs (Most Recent):  Temp: 97.2 °F (36.2 °C) (03/19/18 1700)  Pulse: 75 (03/19/18 1700)  Resp: (!) 23 (03/18/18 0345)  BP: 96/60 (03/19/18 1200)  SpO2: 100 % (03/19/18 1700) Vital Signs (24h Range):  Temp:  [96.1 °F (35.6 °C)-99 °F (37.2 °C)] 97.2 °F (36.2 °C)  Pulse:  [] 75  SpO2:  [95 %-100 %] 100 %  BP: ()/(54-76) 96/60  Arterial Line BP: ()/(43-66) 109/52     Weight: 76.3 kg (168 lb 3.4 oz)  Body mass index is 26.35 kg/m².    Estimated Creatinine Clearance: 125.9 mL/min (based on SCr of 0.7 mg/dL).    Physical Exam   Constitutional: He appears well-developed and well-nourished. No distress.   Chronically ill appearing. Intubated.   HENT:   Head: Atraumatic.   Mouth/Throat: Oropharynx is clear and moist. No oropharyngeal exudate.   Eyes: Conjunctivae are normal. Pupils are equal, round, and reactive to light. No scleral icterus.   Neck: Neck supple.   Cardiovascular: Normal rate and regular rhythm.  Exam reveals no friction rub.    No murmur heard.  Pulmonary/Chest: No respiratory distress. He has no wheezes. He has rales. He exhibits no tenderness.   Intubated.   Abdominal: Soft. Bowel sounds are normal. He exhibits no distension. There is no tenderness. There is no rebound and no guarding.   Musculoskeletal: Normal range of motion. He exhibits no edema.   Lymphadenopathy:     He has no cervical adenopathy.   Neurological:   Intubated/sedated.   Skin: No rash noted. No erythema.   Mottling of some digits.       Significant Labs:   CBC:     Recent Labs  Lab 03/18/18  2144 03/19/18  0247 03/19/18  1413   WBC 3.35* 2.26* 3.69*   HGB 6.6* 6.3* 9.7*   HCT 20.4* 19.7* 29.5*   PLT 62* 55* 69*     CMP:   Recent Labs  Lab 03/17/18  2231 03/18/18  0411  03/18/18  2144 03/19/18  0247 03/19/18  0811 03/19/18  1413    144  144  < > 144 144  144  --  146*   K 5.0 5.0  5.0  < > 3.5 3.6  3.6 4.0 4.8    103  103  < > 104  104  104  --  105   CO2 21* 21*  21*  < > 24 24  24  --  24   * 157*  157*  < > 148* 125*  125*  --  151*   BUN 9 8  8  < > 9 8  8  --  6   CREATININE 0.9 0.8  0.8  < > 0.9 0.8  0.8  --  0.7   CALCIUM 8.5* 8.3*  8.3*  < > 8.3* 7.8*  7.8*  --  8.0*   PROT 6.2 5.8*  --   --  5.0*  --   --    ALBUMIN 2.3* 2.3*  2.3*  < > 2.2* 2.1*  2.1*  --  2.3*   BILITOT 2.1* 2.1*  --   --  2.1*  --   --    ALKPHOS 196* 187*  --   --  155*  --   --    AST 67* 101*  --   --  123*  --   --    ALT 21 33  --   --  42  --   --    ANIONGAP 20* 20*  20*  < > 16 16  16  --  17*   EGFRNONAA >60.0 >60.0  >60.0  < > >60.0 >60.0  >60.0  --  >60.0   < > = values in this interval not displayed.    Significant Imaging: I have reviewed all pertinent imaging results/findings within the past 24 hours.     CT chest:  Status post heart transplantation with new multifocal subsegmental consolidation throughout both lungs.  Similar findings were present on the CT dated 12/12/2017 with partial improvement on 02/14/2018 therefore we doubt malignancy.  Differential considerations include multifocal infectious pneumonia, hemorrhage, or aspiration.  We consider pulmonary edema due to abnormal capillary permeability or cardiac decompensation less likely.  Lymphoma could present similarly although felt less likely due to improvement on CT of 2/14/2018.    Continued moderate sized incompletely dependent right pleural effusion with rounded consolidation in the right lower lobe which probably represents rounded atelectasis, similar to prior, new from 7/19/2016.    Redemonstration of mild scattered ascites in the upper abdomen.     Microbiology:  3/11 stool WBC negative  3/11 stool cx: negative  3/11 O&P negative  3/11 C.diff negative  3/11 Giardia antigen negative  3/11 Cryptosporidium antigen negative  3/11 rotavirus negative  3/11 norovirus negative  3/12 CMV quant negative  3/13 blood cx: NGTD  3/13 sputum cx: NGTD  3/13 aspergillus  antigen negative  3/13 fungitell negative  3/13 urine histo negative  3/13 urine blasto negative  3/13 crypto antigen negative  3/13 urine legionella negative  3/13 RVP + for RSV A  3/13 Strongyloides ab negative  3/13 [place] stool pathogens panel negative  3/14 quantiferon pending  3/14 BAL cx: NGTD, BAL asp ag negative, BAL CMV PCR negative; Legionella cx NGTD

## 2018-03-19 NOTE — ASSESSMENT & PLAN NOTE
-S/P thoracentesis X 2, followed by VATS/pleurex cath placement (January 2018) with removal of pleurex (February 2018) and 3rd thoracentesis 2/14/18 with no significant findings on fluid removal. Moderate right pleural effusion stable by CXR 3/11/18 and chest CT 3/13/18  -Last paracentesis was in January. Abd US 3/12/18 confirmed ongoing ascites (not tense at all). Will consider getting paracentesis this admit  -Continue Levophed for MAP 60. Vaso 0.04u/h.

## 2018-03-19 NOTE — SUBJECTIVE & OBJECTIVE
"Interval HPI:   Overnight events:  Remains intubated and sedated, on pressors.   Getting CRRT.   PRBC for low H/H.   Remains on HC 100mg q8.   BG at goal on minimal insulin requirements on intensive gtt.   Getting LT4 125mcg per NGT    /76 (BP Location: Right arm, Patient Position: Lying)   Pulse 75   Temp 97.2 °F (36.2 °C)   Resp (!) 23   Ht 5' 7" (1.702 m)   Wt 76.3 kg (168 lb 3.4 oz)   SpO2 100%   BMI 26.35 kg/m²     Labs Reviewed and Include      Recent Labs  Lab 03/19/18  0247 03/19/18  0811   *  125*  --    CALCIUM 7.8*  7.8*  --    ALBUMIN 2.1*  2.1*  --    PROT 5.0*  --      144  --    K 3.6  3.6 4.0   CO2 24  24  --      104  --    BUN 8  8  --    CREATININE 0.8  0.8  --    ALKPHOS 155*  --    ALT 42  --    *  --    BILITOT 2.1*  --      Lab Results   Component Value Date    WBC 2.26 (L) 03/19/2018    HGB 6.3 (L) 03/19/2018    HCT 19.7 (LL) 03/19/2018    MCV 88 03/19/2018    PLT 55 (L) 03/19/2018     No results for input(s): TSH, FREET4 in the last 168 hours.  Lab Results   Component Value Date    HGBA1C 6.9 (H) 02/11/2018       Nutritional status:   Body mass index is 26.35 kg/m².  Lab Results   Component Value Date    ALBUMIN 2.1 (L) 03/19/2018    ALBUMIN 2.1 (L) 03/19/2018    ALBUMIN 2.2 (L) 03/18/2018     Lab Results   Component Value Date    PREALBUMIN 5 (L) 03/15/2018    PREALBUMIN 18 (L) 03/24/2015    PREALBUMIN 19 (L) 12/17/2014       Estimated Creatinine Clearance: 110.2 mL/min (based on SCr of 0.8 mg/dL).    Accu-Checks  Recent Labs      03/18/18   1205  03/18/18   1357  03/18/18   1619  03/18/18   1805  03/18/18   1959  03/18/18   2148  03/19/18   0020  03/19/18   0316  03/19/18   0551  03/19/18   0802   POCTGLUCOSE  205*  206*  191*  190*  157*  155*  142*  125*  151*  120*       Current Medications and/or Treatments Impacting Glycemic Control  Immunotherapy:  Immunosuppressants     None        Steroids:   Hormones     Start     Stop Route " Frequency Ordered    03/17/18 1530  vasopressin (PITRESSIN) 0.2 Units/mL in dextrose 5 % 100 mL infusion      -- IV Continuous 03/17/18 1424    03/15/18 1400  hydrocortisone sodium succinate injection 100 mg      -- IV Every 8 hours 03/15/18 1224        Pressors:    Autonomic Drugs     Start     Stop Route Frequency Ordered    03/14/18 1045  norepinephrine 16 mg in dextrose 5 % 250 mL infusion     Question Answer Comment   Titrate by: (in mcg/kg/min) 0.02    Titrate interval: (in minutes) 2    Titrate to maintain: (MAP or SBP) MAP    Greater than: (in mmHg) 60    Maximum dose: (in mcg/kg/min) 3        -- IV Continuous 03/14/18 0937        Hyperglycemia/Diabetes Medications: Antihyperglycemics     Start     Stop Route Frequency Ordered    03/14/18 1630  insulin regular (Humulin R) 100 Units in sodium chloride 0.9% 100 mL infusion     Question:  Insulin Rate Adjustment (DO NOT MODIFY ANSWER)  Answer:  [email or web address]    -- IV Continuous 03/14/18 1530

## 2018-03-19 NOTE — ASSESSMENT & PLAN NOTE
-S/P Bronch and intubation 3/14. Cultures ngtd.   -Appreciate PUL/ID Cx. RCV positive. Continue Ribavarin/IVIG/stress dose steroids per lung transplant protocol for severe RSV pneumonia.   -Continue empiric vanc/zosyn/azithromycin for broad bacterial coverage.   -Aspergill Ag neg from BAL. Wonder if we can stop posaconazole.  -CMV PCR also neg. Wonder if ganciclovir can be stopped as well(will touch base with ID).  - Urine histo/blasto antigens neg, crypto antigen neg, urine legionella neg, and Quantiferon pending.  -Pulmonology managing vent

## 2018-03-19 NOTE — ASSESSMENT & PLAN NOTE
41yo man w/a history of DM1, Hashimoto's thyroiditis, and ICM (s/p OHT 11/18/2014, CMV D+/R+, steroid induction, on maintenance tacro/MMF/pred; c/b early hemorrhagic tamponade requiring washout, delayed closure, and pericardial window and subsequent AF w/RVR, and CACHORRO; late course c/b ABMR 4/2015 s/p rituxan, PLEX, and IVIG; subsequent C.diff/CMV reactivation, restrictive cardiomyopathy with recurrent pleural effusions s/p VATS/pleurex catheter 1/2018 with removal 2/2018 and ascites requiring repeated LVP, and CKD) who was admitted on 3/11/2018 with ~3wks of worsening profuse non-bloody diarrhea, associated cramping abdominal pain, N/V, and a week of acute on chronic cough, SOB, hypoxia requiring intubation, and fevers and was found to have multifocal pneumonia on CT chest due to RSV-A pneumonia with suspected superimposed bacterial pneumonia. He remains critically ill with ongoing pressor requirement, hypoxic RF, CRRT requirement, and possible evolving DIC. Will aim to taper antimicrobials today to ensure polypharmacy is not driving his hematologic abnormalities.    - will continue empiric vancomycin for now given GPCC that just resulted from repeat blood cultures -- suspect contaminant but will await speciation  - would continue zosyn for now while awaiting final BAL cx results  - would stop azithromycin as he has completed a >5 day course for possible atypical pneumonia without positive Legionella studies  - may continue empiric posaconazole for now but will transition to per tube to avoid accumulation to cyclodextrin carrier  - would transition IV GCV back to maintenance dosing as no obvious evidence of CMV disease (no diarrhea since Friday, negative serum/BAL CMV PCR)  - would continue oral ribavirin (while intubated avoiding inhaled) + IVIG + stress dose steroids per lung transplant protocol for severe RSV pneumonia -- given myelosuppression, will aim for 5 day course to minimize toxicity  - await pending tests  including: final BAL cultures and Quantiferon

## 2018-03-19 NOTE — SUBJECTIVE & OBJECTIVE
Interval History: No issues overnight. Blood counts dropping.     Continuous Infusions:   sodium chloride 0.9% 200 mL/hr at 03/18/18 1140    sodium chloride 0.9%      dexmedetomidine (PRECEDEX) infusion 0.6 mcg/kg/hr (03/19/18 1056)    fentanyl 5 mL/hr at 03/19/18 1000    insulin (HUMAN R) infusion (adults) 0.1 Units/hr (03/19/18 1015)    norepinephrine bitartrate-D5W 0.04 mcg/kg/min (03/19/18 1015)    vasopressin (PITRESSIN) infusion 0.04 Units/min (03/19/18 1000)     Scheduled Meds:   azithromycin (ZITHROMAX) 500 mg IVPB  500 mg Intravenous Q24H    chlorhexidine  15 mL Mouth/Throat BID    docusate  100 mg Per NG tube BID    escitalopram oxalate  20 mg Per NG tube Daily    famotidine (PF)  20 mg Intravenous Daily    ganciclovir (CYTOVENE) IVPB  200 mg Intravenous Q24H    hydrocortisone sodium succinate  100 mg Intravenous Q8H    levothyroxine  125 mcg Per NG tube Before breakfast    piperacillin-tazobactam (ZOSYN) IVPB  4.5 g Intravenous Q8H    [START ON 3/20/2018] polyethylene glycol  17 g Per NG tube Daily    posaconazole (NOXAFIL) 300 mg in dextrose 5% IVPB  300 mg Intravenous Daily    ribavirin  600 mg Oral BID    sodium chloride 0.9%  3 mL Intravenous Q8H    vancomycin (VANCOCIN) IVPB  1,000 mg Intravenous Q24H     PRN Meds:sodium chloride, sodium chloride, acetaminophen, dextrose 50%, dextrose 50%, glucagon (human recombinant), glucose, glucose, k phos di & mono-sod phos mono    Review of patient's allergies indicates:   Allergen Reactions    No known drug allergies      Objective:     Vital Signs (Most Recent):  Temp: 97.2 °F (36.2 °C) (03/19/18 1100)  Pulse: 74 (03/19/18 1100)  Resp: (!) 23 (03/18/18 0345)  BP: 107/68 (03/19/18 1100)  SpO2: 100 % (03/19/18 1100) Vital Signs (24h Range):  Temp:  [96.1 °F (35.6 °C)-99 °F (37.2 °C)] 97.2 °F (36.2 °C)  Pulse:  [] 74  SpO2:  [96 %-100 %] 100 %  BP: ()/(54-76) 107/68  Arterial Line BP: ()/(43-66) 113/58     Patient Vitals  for the past 72 hrs (Last 3 readings):   Weight   03/19/18 0913 76.3 kg (168 lb 3.4 oz)   03/17/18 0630 76.3 kg (168 lb 3.4 oz)     Body mass index is 26.35 kg/m².      Intake/Output Summary (Last 24 hours) at 03/19/18 1126  Last data filed at 03/19/18 1015   Gross per 24 hour   Intake          6624.39 ml   Output             9081 ml   Net         -2456.61 ml          Telemetry: ST    Physical Exam   Constitutional: He appears well-developed and well-nourished.   Appears ill   HENT:   Head: Normocephalic and atraumatic.   Eyes: Conjunctivae are normal. Pupils are equal, round, and reactive to light.   Neck: No thyromegaly present.   RIJ trialysis   Cardiovascular: Regular rhythm.    Tachycardic   Pulmonary/Chest:   Intubated and ventilated  Breath sounds are slightly decreased right base. Essentially CTA bilaterally   Abdominal: Soft.   No bowel sounds. + ascites   Musculoskeletal:   1-2+ BUE edema; no MITZY; no cyanosis   Neurological:   Arouses and becomes agitated with sedation breaks   Skin: Skin is warm and dry. Capillary refill takes less than 2 seconds. There is pallor.       Significant Labs:  CBC:    Recent Labs  Lab 03/18/18  0839 03/18/18  2144 03/19/18  0247   WBC 4.10 3.35* 2.26*   RBC 2.54* 2.30* 2.23*   HGB 7.2* 6.6* 6.3*   HCT 22.8* 20.4* 19.7*   PLT 91* 62* 55*   MCV 90 89 88   MCH 28.3 28.7 28.3   MCHC 31.6* 32.4 32.0     BNP:    Recent Labs  Lab 03/13/18  0815   *     CMP:    Recent Labs  Lab 03/17/18  2231 03/18/18  0411 03/18/18  1356 03/18/18  2144 03/19/18  0247 03/19/18  0811   * 157*  157* 209* 148* 125*  125*  --    CALCIUM 8.5* 8.3*  8.3* 8.1* 8.3* 7.8*  7.8*  --    ALBUMIN 2.3* 2.3*  2.3* 2.3* 2.2* 2.1*  2.1*  --    PROT 6.2 5.8*  --   --  5.0*  --     144  144 144 144 144  144  --    K 5.0 5.0  5.0 4.0 3.5 3.6  3.6 4.0   CO2 21* 21*  21* 20* 24 24  24  --     103  103 103 104 104  104  --    BUN 9 8  8 12 9 8  8  --    CREATININE 0.9 0.8  0.8 1.2  0.9 0.8  0.8  --    ALKPHOS 196* 187*  --   --  155*  --    ALT 21 33  --   --  42  --    AST 67* 101*  --   --  123*  --    BILITOT 2.1* 2.1*  --   --  2.1*  --       Coagulation:     Recent Labs  Lab 03/18/18  0929   INR 2.1*     LDH:    Recent Labs  Lab 03/18/18  2222   *     Microbiology:  Microbiology Results (last 7 days)     Procedure Component Value Units Date/Time    Blood culture [[id number]] Collected:  03/18/18 0618    Order Status:  Completed Specimen:  Blood from Peripheral, Hand, Left Updated:  03/19/18 1056     Blood Culture, Routine Gram stain aer bottle: Gram positive cocci in clusters resembling Staph      Blood Culture, Routine Results called to and read back by:Omid Cody RN 03/19/2018  10:55    Blood culture [[id number]] Collected:  03/18/18 0748    Order Status:  Completed Specimen:  Blood from Peripheral, Hand, Right Updated:  03/19/18 1012     Blood Culture, Routine No Growth to date     Blood Culture, Routine No Growth to date    Blood culture [[id number]] Collected:  03/13/18 1637    Order Status:  Completed Specimen:  Blood from Peripheral, Antecubital, Left Updated:  03/18/18 2012     Blood Culture, Routine No growth after 5 days.    Blood culture [[id number]] Collected:  03/13/18 1701    Order Status:  Completed Specimen:  Blood from Peripheral, Forearm, Left Updated:  03/18/18 2012     Blood Culture, Routine No growth after 5 days.    Blood culture [[id number]]     Order Status:  Canceled Specimen:  Blood     Blood culture [[id number]]     Order Status:  Canceled Specimen:  Blood     Culture, Respiratory with Gram Stain [[id number]] Collected:  03/15/18 1541    Order Status:  Completed Specimen:  Respiratory from Sputum Updated:  03/17/18 0914     Respiratory Culture No growth     Gram Stain (Respiratory) <10 epithelial cells per low power field.     Gram Stain (Respiratory) Rare WBC's      Gram Stain (Respiratory) No organisms seen    Culture, Respiratory with Gram Stain [[id number]]  Collected:  03/14/18 1137    Order Status:  Completed Specimen:  Respiratory from BAL, TRIPP Updated:  03/16/18 1046     Respiratory Culture No growth     Gram Stain (Respiratory) No WBC's     Gram Stain (Respiratory) No organisms seen    AFB Culture & Smear [[id number]] Collected:  03/14/18 1137    Order Status:  Completed Specimen:  Bronchial Wash from Amniotic Fluid Updated:  03/15/18 2127     AFB Culture & Smear Culture in progress     AFB CULTURE STAIN No acid fast bacilli seen.    Respiratory Viral Panel by PCR [facility]; Nasal Swab [[id number]]  (Abnormal) Collected:  03/13/18 1733    Order Status:  Completed Specimen:  Respiratory Updated:  03/15/18 1302     Respiratory Virus Panel, source Nasal Swab     RVP - Adenovirus Not Detected     Comment: Detects Serotypes B and E. Detection of Serotype C may   be limited. If Adenovirus infection is suspected and a   Not Detected result is returned the sample should be   re-tested for Adenovirus using an independent method  (e.g. [facility] Adenovirus Quantitative Real-Time  PCR test.          Enterovirus Not Detected     Comment: Cross-reactivity has been observed between certain Rhinovirus  strains and the Enterovirus assay.          Human Bocavirus Not Detected     Human Coronavirus Not Detected     Comment: The Human Coronavirus assay detects Human coronavirus types  229E, OC43,NL63 and HKU1.          RVP - Human Metapneumovirus (hMPV) Not Detected     RVP - Influenza A Not Detected     Influenza A - F2F4-00 Not Detected     RVP - Influenza B Not Detected     Parainfluenza Not Detected     Respiratory Syncytial VirusVirus (RSV) A Positive (A)     Comment: The Respiratory Syncytial Viral assay detects types A and B,  however it does not distinguish between the two.          RVP - Rhinovirus Not Detected     Comment: Cross-Reactivity has been observed between certain   Rhinovirus strains and the Enterovirus assay.  Target Enriched Mulitplex Polymerase Chain  Reaction (TEM-PCR)  allows for the detection of multiple pathogens out of a single  reaction.  This test was developed and its performance   characteristics determined by "[employer].  It has not   been cleared or approved by the U.[place].Food and Drug Administration.  Results should be used in conjunction with clinical findings,   and should not form the sole basis for a diagnosis or treatment  decision.  TEM-PCR is a licensed technology of [employer].         Narrative:       Receiving Lab:->[facility]    Culture, Respiratory with Gram Stain [[id number]] Collected:  03/13/18 1441    Order Status:  Completed Specimen:  Respiratory from Sputum, Expectorated Updated:  03/15/18 1237     Respiratory Culture Normal respiratory hank     Gram Stain (Respiratory) <10 epithelial cells per low power field.     Gram Stain (Respiratory) Rare WBC's     Gram Stain (Respiratory) Moderate Gram positive cocci     Gram Stain (Respiratory) Many Gram negative rods    Fungus culture [[id number]] Collected:  03/14/18 1137    Order Status:  Completed Specimen:  Bronchial Wash from Amniotic Fluid Updated:  03/15/18 1124     Fungus (Mycology) Culture Culture in progress    Stool culture **CANNOT BE ORDERED STAT** [[id number]] Collected:  03/11/18 1923    Order Status:  Completed Specimen:  Stool from Stool Updated:  03/14/18 1549     Stool Culture No enteric hank     Stool Culture No Salmonella,Shigella,Vibrio,Campylobacter,Yersinia isolated.    Culture, Fluid  (Aerobic) [[id number]] Collected:  03/14/18 1137    Order Status:  Canceled Specimen:  Bronchial Wash from Amniotic Fluid Updated:  03/14/18 1416    Cryptococcal antigen [[id number]] Collected:  03/14/18 0425    Order Status:  Completed Specimen:  Blood from Blood Updated:  03/14/18 1217     Cryptococcal Ag, Blood Negative    Culture, Respiratory with Gram Stain [[id number]] Collected:  03/13/18 1701    Order Status:  Completed Specimen:  Respiratory from Sputum,  Expectorated Updated:  03/13/18 2227     Respiratory Culture Specimen inadequate - culture not performed     Gram Stain (Respiratory) >10epis/lfp and <than many WBC's     Gram Stain (Respiratory) Predominance of oropharyngeal hank. Please recollect.          I have reviewed all pertinent labs within the past 24 hours.    Estimated Creatinine Clearance: 110.2 mL/min (based on SCr of 0.8 mg/dL).    Diagnostic Results:  I have reviewed all pertinent imaging results/findings within the past 24 hours.

## 2018-03-19 NOTE — PROGRESS NOTES
H/H 9.7/29.5 post 2 units PRBCs (0247 03/19 6.3/19.7). Patient due to receive one more unit PRBCs. NETTIE Mao udpated- ordered hold last unit PRBCs. Telephone readback x2

## 2018-03-19 NOTE — SUBJECTIVE & OBJECTIVE
Interval History: Remains intubated on minimal settings. Still on minimal pressors/CRRT. Afebrile. Cultures unremarkable to date. Myelosuppression and possible DIC noted.    Review of Systems   Unable to perform ROS: Intubated     Objective:     Vital Signs (Most Recent):  Temp: 97.2 °F (36.2 °C) (03/19/18 1700)  Pulse: 75 (03/19/18 1700)  Resp: (!) 23 (03/18/18 0345)  BP: 96/60 (03/19/18 1200)  SpO2: 100 % (03/19/18 1700) Vital Signs (24h Range):  Temp:  [96.1 °F (35.6 °C)-99 °F (37.2 °C)] 97.2 °F (36.2 °C)  Pulse:  [] 75  SpO2:  [95 %-100 %] 100 %  BP: ()/(54-76) 96/60  Arterial Line BP: ()/(43-66) 109/52     Weight: 76.3 kg (168 lb 3.4 oz)  Body mass index is 26.35 kg/m².    Estimated Creatinine Clearance: 125.9 mL/min (based on SCr of 0.7 mg/dL).    Physical Exam   Constitutional: He appears well-developed and well-nourished. No distress.   Chronically ill appearing. Intubated.   HENT:   Head: Atraumatic.   Mouth/Throat: Oropharynx is clear and moist. No oropharyngeal exudate.   Eyes: Conjunctivae are normal. Pupils are equal, round, and reactive to light. No scleral icterus.   Neck: Neck supple.   Cardiovascular: Normal rate and regular rhythm.  Exam reveals no friction rub.    No murmur heard.  Pulmonary/Chest: No respiratory distress. He has no wheezes. He has rales. He exhibits no tenderness.   Intubated.   Abdominal: Soft. Bowel sounds are normal. He exhibits no distension. There is no tenderness. There is no rebound and no guarding.   Musculoskeletal: Normal range of motion. He exhibits no edema.   Lymphadenopathy:     He has no cervical adenopathy.   Neurological:   Intubated/sedated.   Skin: No rash noted. No erythema.   Mottling of some digits.       Significant Labs:   CBC:     Recent Labs  Lab 03/18/18  2144 03/19/18  0247 03/19/18  1413   WBC 3.35* 2.26* 3.69*   HGB 6.6* 6.3* 9.7*   HCT 20.4* 19.7* 29.5*   PLT 62* 55* 69*     CMP:   Recent Labs  Lab 03/17/18  3781 03/18/18  0417   03/18/18  2144 03/19/18  0247 03/19/18  0811 03/19/18  1413    144  144  < > 144 144  144  --  146*   K 5.0 5.0  5.0  < > 3.5 3.6  3.6 4.0 4.8    103  103  < > 104 104  104  --  105   CO2 21* 21*  21*  < > 24 24  24  --  24   * 157*  157*  < > 148* 125*  125*  --  151*   BUN 9 8  8  < > 9 8  8  --  6   CREATININE 0.9 0.8  0.8  < > 0.9 0.8  0.8  --  0.7   CALCIUM 8.5* 8.3*  8.3*  < > 8.3* 7.8*  7.8*  --  8.0*   PROT 6.2 5.8*  --   --  5.0*  --   --    ALBUMIN 2.3* 2.3*  2.3*  < > 2.2* 2.1*  2.1*  --  2.3*   BILITOT 2.1* 2.1*  --   --  2.1*  --   --    ALKPHOS 196* 187*  --   --  155*  --   --    AST 67* 101*  --   --  123*  --   --    ALT 21 33  --   --  42  --   --    ANIONGAP 20* 20*  20*  < > 16 16  16  --  17*   EGFRNONAA >60.0 >60.0  >60.0  < > >60.0 >60.0  >60.0  --  >60.0   < > = values in this interval not displayed.    Significant Imaging: I have reviewed all pertinent imaging results/findings within the past 24 hours.     CT chest:  Status post heart transplantation with new multifocal subsegmental consolidation throughout both lungs.  Similar findings were present on the CT dated 12/12/2017 with partial improvement on 02/14/2018 therefore we doubt malignancy.  Differential considerations include multifocal infectious pneumonia, hemorrhage, or aspiration.  We consider pulmonary edema due to abnormal capillary permeability or cardiac decompensation less likely.  Lymphoma could present similarly although felt less likely due to improvement on CT of 2/14/2018.    Continued moderate sized incompletely dependent right pleural effusion with rounded consolidation in the right lower lobe which probably represents rounded atelectasis, similar to prior, new from 7/19/2016.    Redemonstration of mild scattered ascites in the upper abdomen.     Microbiology:  3/11 stool WBC negative  3/11 stool cx: negative  3/11 O&P negative  3/11 C.diff negative  3/11 Giardia antigen  negative  3/11 Cryptosporidium antigen negative  3/11 rotavirus negative  3/11 norovirus negative  3/12 CMV quant negative  3/13 blood cx: NGTD  3/13 sputum cx: NGTD  3/13 aspergillus antigen negative  3/13 fungitell negative  3/13 urine histo negative  3/13 urine blasto negative  3/13 crypto antigen negative  3/13 urine legionella negative  3/13 RVP + for RSV A  3/13 Strongyloides ab negative  3/13 [place] stool pathogens panel negative  3/14 quantiferon pending  3/14 BAL cx: NGTD, BAL asp ag negative, BAL CMV PCR negative; Legionella cx NGTD

## 2018-03-19 NOTE — ASSESSMENT & PLAN NOTE
Abnormal TFTs upon admit.  Unclear if secondary to illness, but with evidence of iatrogenic hyperthyroidism in past while on above weight based dose.     Continue LT4 ng 125mcg daily (decreased from 150mcg)  Repeat TFTs in 4 weeks (due ~4/15)

## 2018-03-19 NOTE — PROGRESS NOTES
[facility]  Pul/Critical Care - Medicine  Progress Note    Patient Name: Lv Crocker  MRN: [id number]  Admission Date: 3/11/2018  Hospital Length of Stay: 7 days  Code Status: Full Code  Attending Provider: Jose A Lloyd MD  Primary Care Provider: Philippe Mohr MD   Principal Problem: Acute respiratory failure with hypoxia    Subjective:   Was seen and examined. H/H dropped again requiring transfusion. fibrinogen levels < 70 this morning. On 2 vasopressors. Culture data reviewed.  Review of Systems   Unable to obtain  Objective:     Vital Signs (Most Recent):  Temp: 98 °F (36.7 °C) (03/19/18 1611)  Pulse: 75 (03/19/18 1600)  Resp: (!) 23 (03/18/18 0345)  BP: 96/60 (03/19/18 1200)  SpO2: 100 % (03/19/18 1600) Vital Signs (24h Range):  Temp:  [96.1 °F (35.6 °C)-99 °F (37.2 °C)] 98 °F (36.7 °C)  Pulse:  [] 75  SpO2:  [95 %-100 %] 100 %  BP: ()/(54-76) 96/60  Arterial Line BP: ()/(43-66) 108/53     Weight: 76.3 kg (168 lb 3.4 oz)  Body mass index is 26.35 kg/m².      Intake/Output Summary (Last 24 hours) at 03/19/18 1657  Last data filed at 03/19/18 1628   Gross per 24 hour   Intake          7480.84 ml   Output             9951 ml   Net         -2470.16 ml       Physical Exam   Constitutional: He is oriented to person, place, and time. He appears well-developed and well-nourished.   Sedated on mechanical ventilation   HENT:   Head: Normocephalic and atraumatic.   Neck: Normal range of motion. Neck supple. No tracheal deviation present. No thyromegaly present.   Cardiovascular:   tachycardic   Pulmonary/Chest:   On the vent    Abdominal: Soft.   Musculoskeletal: He exhibits no edema.   Neurological: He is alert and oriented to person, place, and time.   Skin: Skin is warm and dry.       Vents:  Vent Mode: A/C (03/19/18 1522)  Ventilator Initiated: Yes (03/14/18 0932)  Set Rate: 21 bmp (03/19/18 1522)  Vt Set: 400 mL (03/19/18 1522)  Pressure Support: 0 cmH20 (03/19/18  1522)  PEEP/CPAP: 5 cmH20 (03/19/18 1522)  Oxygen Concentration (%): 40 (03/19/18 1600)  Peak Airway Pressure: 36 cmH2O (03/19/18 1522)  Plateau Pressure: 28 cmH20 (03/19/18 1522)  Total Ve: 8.94 mL (03/19/18 1522)  F/VT Ratio<105 (RSBI): (!) 0 (03/17/18 0740)    Lines/Drains/Airways     Central Venous Catheter Line                 Trialysis (Dialysis) Catheter 03/14/18 0813 right internal jugular 5 days          Drain                 NG/OG Tube 03/14/18 1300 [place] mouth 5 days          Airway                 Airway - Non-Surgical 03/14/18 0930 Endotracheal Tube 5 days          Arterial Line                 Arterial Line 03/14/18 1600 Right Femoral 5 days          Peripheral Intravenous Line                 Peripheral IV - Single Lumen 03/13/18 1600 Left Antecubital 6 days         Peripheral IV - Single Lumen 03/14/18 1736 Right Forearm 4 days         Midline Catheter Insertion/Assessment  - Single Lumen 03/17/18 1454 Left cephalic vein (lateral side of arm) 18g x 8cm 2 days                Significant Labs:    CBC/Anemia Profile:    Recent Labs  Lab 03/18/18  2144 03/19/18  0247 03/19/18  1413   WBC 3.35* 2.26* 3.69*   HGB 6.6* 6.3* 9.7*   HCT 20.4* 19.7* 29.5*   PLT 62* 55* 69*   MCV 89 88 88   RDW 17.3* 17.2* 16.2*        Chemistries:    Recent Labs  Lab 03/17/18  2231 03/18/18  0411  03/18/18  2144 03/18/18  2222 03/19/18  0247 03/19/18  0811 03/19/18  1413    144  144  < > 144  --  144  144  --  146*   K 5.0 5.0  5.0  < > 3.5  --  3.6  3.6 4.0 4.8    103  103  < > 104  --  104  104  --  105   CO2 21* 21*  21*  < > 24  --  24  24  --  24   BUN 9 8  8  < > 9  --  8  8  --  6   CREATININE 0.9 0.8  0.8  < > 0.9  --  0.8  0.8  --  0.7   CALCIUM 8.5* 8.3*  8.3*  < > 8.3*  --  7.8*  7.8*  --  8.0*   ALBUMIN 2.3* 2.3*  2.3*  < > 2.2*  --  2.1*  2.1*  --  2.3*   PROT 6.2 5.8*  --   --   --  5.0*  --   --    BILITOT 2.1* 2.1*  --   --   --  2.1*  --   --    ALKPHOS 196* 187*  --   --    --  155*  --   --    ALT 21 33  --   --   --  42  --   --    AST 67* 101*  --   --   --  123*  --   --    MG 2.0 1.8  1.8  < >  --  1.9 1.8 1.5*  --    PHOS 3.1 3.2  < > 1.7*  --  1.7*  --  1.4*   < > = values in this interval not displayed.        Assessment/Plan:   1. ARDS  2. CACHORRO      42 yeear-old male with a PMH of a heart transplant who was admitted to the hospital on 3/11 for diarrhea and cough. He was intubated on 5/14 for hypoxemic and hypercapnic resp failure. His CXR has improved c/w last week. New issues are his development of DIC. On 2 pressors. Heme/Onc consulted. Plan:    -No SAT/SBT as pt has many critical care needs including DIC and continued septic shock.  -LTV ventilation at 6 cc/Kg of IBW.  -CRRT  -Abs managed by ID   -DVT prophylaxis    We will continue to follow with you. Not ready for SAT/SBT today.  Geri Stringer MD  Critical Care - Medicine  [facility]

## 2018-03-19 NOTE — ASSESSMENT & PLAN NOTE
-The patient has had a progressive thrombocytopenia starting 3/17/18.  -The patients peripheral smear was viewed showing shistocytes suggestive of a MAHA.  -Differential for the patients thrombocytopenia include HIT, drug induced (vancomycin, zosyn, ganciclovir, and ribavirin), DIC.  -The patient developed thrombocytopenia 5 days after receiving enoxaparin and has a 4T score of 5 indicating a ~ 14% chance of HIT.  The patients HIT panel was positive with OD of 0.431 indicating a 1-5% chance of having HIT.  Would wait for HILDA before considering anticoagulation with argatroban.  -The patients thrombocytopenia could be drug induced and would consider stopping ganciclovir if possible as this is the most likely culprit of the potential offending medicaitons.  -The patient has a low fibrinogen, elevated INR, shistocytes on peripheral smear and elevated LDH which all point towards DIC.  The haptoglobin is elevated which goes against hemolytic anemia,; however, haptoglobin is also an acute phase reactant.  As DIC is the higher on the differential would treat as such by treating underlying cause which is likely sepsis.      -Would check fibrinogen, INR and plt count every 8 hours.  -Would give cryoprecipitate to get fibrinogen over 100.  Would give one unit at a time.  -Would give FFP to get the INR below 1.8.  Would give at least two units at a time.  May consider giving the patient Vitamin K given recent use of coumadin.  -Would give a single unit of platelets at a time to get platelets above 50k.

## 2018-03-19 NOTE — SUBJECTIVE & OBJECTIVE
Oncology Treatment Plan:   [No treatment plan]    Medications:  Continuous Infusions:   sodium chloride 0.9% 200 mL/hr at 03/19/18 1145    dexmedetomidine (PRECEDEX) infusion 0.6 mcg/kg/hr (03/19/18 1600)    fentanyl 5 mL/hr at 03/19/18 1600    insulin (HUMAN R) infusion (adults) 0.5 Units/hr (03/19/18 1628)    norepinephrine bitartrate-D5W 0.06 mcg/kg/min (03/19/18 1628)    vasopressin (PITRESSIN) infusion 0.04 Units/min (03/19/18 1628)     Scheduled Meds:   azithromycin (ZITHROMAX) 500 mg IVPB  500 mg Intravenous Q24H    chlorhexidine  15 mL Mouth/Throat BID    docusate  100 mg Per NG tube BID    escitalopram oxalate  20 mg Per NG tube Daily    famotidine (PF)  20 mg Intravenous Daily    ganciclovir (CYTOVENE) IVPB  200 mg Intravenous Q24H    hydrocortisone sodium succinate  100 mg Intravenous Q8H    levothyroxine  125 mcg Per NG tube Before breakfast    piperacillin-tazobactam (ZOSYN) IVPB  4.5 g Intravenous Q8H    [START ON 3/20/2018] polyethylene glycol  17 g Per NG tube Daily    posaconazole (NOXAFIL) 300 mg in dextrose 5% IVPB  300 mg Intravenous Daily    ribavirin  600 mg Oral BID    sodium chloride 0.9%  3 mL Intravenous Q8H    vancomycin (VANCOCIN) IVPB  1,000 mg Intravenous Q24H     PRN Meds:sodium chloride, sodium chloride, sodium chloride, acetaminophen, dextrose 50%, dextrose 50%, glucagon (human recombinant), glucose, glucose, k phos di & mono-sod phos mono, magnesium sulfate IVPB     Review of Systems   Unable to perform ROS: Intubated     Objective:     Vital Signs (Most Recent):  Temp: 98 °F (36.7 °C) (03/19/18 1611)  Pulse: 75 (03/19/18 1600)  Resp: (!) 23 (03/18/18 0345)  BP: 96/60 (03/19/18 1200)  SpO2: 100 % (03/19/18 1600) Vital Signs (24h Range):  Temp:  [96.1 °F (35.6 °C)-99 °F (37.2 °C)] 98 °F (36.7 °C)  Pulse:  [] 75  SpO2:  [95 %-100 %] 100 %  BP: ()/(54-76) 96/60  Arterial Line BP: ()/(43-66) 108/53     Weight: 76.3 kg (168 lb 3.4 oz)  Body mass  index is 26.35 kg/m².  Body surface area is 1.9 meters squared.      Intake/Output Summary (Last 24 hours) at 03/19/18 1649  Last data filed at 03/19/18 1628   Gross per 24 hour   Intake          7480.84 ml   Output             9951 ml   Net         -2470.16 ml       Physical Exam   Constitutional: He appears well-developed and well-nourished.   HENT:   Head: Normocephalic and atraumatic.   ET tube in place   Eyes: Right eye exhibits no discharge. Left eye exhibits no discharge. No scleral icterus.   Cardiovascular: Normal rate, regular rhythm, normal heart sounds and intact distal pulses.  Exam reveals no gallop and no friction rub.    No murmur heard.  Pulmonary/Chest: Breath sounds normal. No respiratory distress. He has no wheezes. He has no rales. He exhibits no tenderness.   Auscultated anteriorly.   Abdominal: Soft. Bowel sounds are normal. He exhibits no distension and no mass. There is no tenderness. There is no rebound.   Musculoskeletal: Normal range of motion. He exhibits edema. He exhibits no tenderness.   Neurological:   Sedated   Skin: Skin is warm and dry. No rash noted. No erythema.       Significant Labs:   Recent Results (from the past 24 hour(s))   POCT glucose    Collection Time: 03/18/18  6:05 PM   Result Value Ref Range    POCT Glucose 190 (H) 70 - 110 mg/dL   POCT glucose    Collection Time: 03/18/18  7:59 PM   Result Value Ref Range    POCT Glucose 157 (H) 70 - 110 mg/dL   Renal function panel    Collection Time: 03/18/18  9:44 PM   Result Value Ref Range    Glucose 148 (H) 70 - 110 mg/dL    Sodium 144 136 - 145 mmol/L    Potassium 3.5 3.5 - 5.1 mmol/L    Chloride 104 95 - 110 mmol/L    CO2 24 23 - 29 mmol/L    BUN, Bld 9 6 - 20 mg/dL    Calcium 8.3 (L) 8.7 - 10.5 mg/dL    Creatinine 0.9 0.5 - 1.4 mg/dL    Albumin 2.2 (L) 3.5 - 5.2 g/dL    Phosphorus 1.7 (L) 2.7 - 4.5 mg/dL    eGFR if African American >60.0 >60 mL/min/1.73 m^2    eGFR if non African American >60.0 >60 mL/min/1.73 m^2     Anion Gap 16 8 - 16 mmol/L   CBC auto differential    Collection Time: 03/18/18  9:44 PM   Result Value Ref Range    WBC 3.35 (L) 3.90 - 12.70 K/uL    RBC 2.30 (L) 4.60 - 6.20 M/uL    Hemoglobin 6.6 (L) 14.0 - 18.0 g/dL    Hematocrit 20.4 (L) 40.0 - 54.0 %    MCV 89 82 - 98 fL    MCH 28.7 27.0 - 31.0 pg    MCHC 32.4 32.0 - 36.0 g/dL    RDW 17.3 (H) 11.5 - 14.5 %    Platelets 62 (L) 150 - 350 K/uL    MPV 9.5 9.2 - 12.9 fL    Immature Granulocytes CANCELED 0.0 - 0.5 %    Immature Grans (Abs) CANCELED 0.00 - 0.04 K/uL    Lymph # CANCELED 1.0 - 4.8 K/uL    Mono # CANCELED 0.3 - 1.0 K/uL    Eos # CANCELED 0.0 - 0.5 K/uL    Baso # CANCELED 0.00 - 0.20 K/uL    nRBC 0 0 /100 WBC    Gran% 83.0 (H) 38.0 - 73.0 %    Lymph% 8.0 (L) 18.0 - 48.0 %    Mono% 5.0 4.0 - 15.0 %    Eosinophil% 0.0 0.0 - 8.0 %    Basophil% 0.0 0.0 - 1.9 %    Bands 3.0 %    Metamyelocytes 1.0 %    Platelet Estimate Decreased (A)     Aniso Slight     Poik Slight     Poly Occasional     Hypo Occasional     Ovalocytes Occasional     Target Cells Occasional     Tear Drop Cells Occasional     [place] Cells Occasional     Schistocytes Present     Fragmented Cells Occasional     Differential Method Manual    POCT glucose    Collection Time: 03/18/18  9:48 PM   Result Value Ref Range    POCT Glucose 155 (H) 70 - 110 mg/dL   Magnesium    Collection Time: 03/18/18 10:22 PM   Result Value Ref Range    Magnesium 1.9 1.6 - 2.6 mg/dL   Lactate dehydrogenase    Collection Time: 03/18/18 10:22 PM   Result Value Ref Range     (H) 110 - 260 U/L   Haptoglobin    Collection Time: 03/18/18 10:22 PM   Result Value Ref Range    Haptoglobin 163 30 - 250 mg/dL   POCT glucose    Collection Time: 03/19/18 12:20 AM   Result Value Ref Range    POCT Glucose 142 (H) 70 - 110 mg/dL   CBC auto differential    Collection Time: 03/19/18  2:47 AM   Result Value Ref Range    WBC 2.26 (L) 3.90 - 12.70 K/uL    RBC 2.23 (L) 4.60 - 6.20 M/uL    Hemoglobin 6.3 (L) 14.0 - 18.0 g/dL     Hematocrit 19.7 (LL) 40.0 - 54.0 %    MCV 88 82 - 98 fL    MCH 28.3 27.0 - 31.0 pg    MCHC 32.0 32.0 - 36.0 g/dL    RDW 17.2 (H) 11.5 - 14.5 %    Platelets 55 (L) 150 - 350 K/uL    MPV 11.4 9.2 - 12.9 fL    Immature Granulocytes CANCELED 0.0 - 0.5 %    Immature Grans (Abs) CANCELED 0.00 - 0.04 K/uL    Lymph # Test Not Performed 1.0 - 4.8 K/uL    Mono # Test Not Performed 0.3 - 1.0 K/uL    Eos # Test Not Performed 0.0 - 0.5 K/uL    Baso # Test Not Performed 0.00 - 0.20 K/uL    nRBC 0 0 /100 WBC    Gran% 82.0 (H) 38.0 - 73.0 %    Lymph% 10.0 (L) 18.0 - 48.0 %    Mono% 7.0 4.0 - 15.0 %    Eosinophil% 0.0 0.0 - 8.0 %    Basophil% 0.0 0.0 - 1.9 %    Metamyelocytes 1.0 %    Platelet Estimate Decreased (A)     Aniso Slight     Poik Slight     Poly Occasional     Hypo Occasional     Ovalocytes Occasional     Target Cells Occasional     [place] Cells Occasional     Schistocytes Present     Basophilic Stippling Occasional     Large/Giant Platelets Present     Fragmented Cells Occasional     Differential Method Manual    Comprehensive metabolic panel    Collection Time: 03/19/18  2:47 AM   Result Value Ref Range    Sodium 144 136 - 145 mmol/L    Potassium 3.6 3.5 - 5.1 mmol/L    Chloride 104 95 - 110 mmol/L    CO2 24 23 - 29 mmol/L    Glucose 125 (H) 70 - 110 mg/dL    BUN, Bld 8 6 - 20 mg/dL    Creatinine 0.8 0.5 - 1.4 mg/dL    Calcium 7.8 (L) 8.7 - 10.5 mg/dL    Total Protein 5.0 (L) 6.0 - 8.4 g/dL    Albumin 2.1 (L) 3.5 - 5.2 g/dL    Total Bilirubin 2.1 (H) 0.1 - 1.0 mg/dL    Alkaline Phosphatase 155 (H) 55 - 135 U/L     (H) 10 - 40 U/L    ALT 42 10 - 44 U/L    Anion Gap 16 8 - 16 mmol/L    eGFR if African American >60.0 >60 mL/min/1.73 m^2    eGFR if non African American >60.0 >60 mL/min/1.73 m^2   Magnesium    Collection Time: 03/19/18  2:47 AM   Result Value Ref Range    Magnesium 1.8 1.6 - 2.6 mg/dL   Tacrolimus level    Collection Time: 03/19/18  2:47 AM   Result Value Ref Range    Tacrolimus Lvl 3.1 (L) 5.0 - 15.0  ng/mL   Renal function panel    Collection Time: 03/19/18  2:47 AM   Result Value Ref Range    Glucose 125 (H) 70 - 110 mg/dL    Sodium 144 136 - 145 mmol/L    Potassium 3.6 3.5 - 5.1 mmol/L    Chloride 104 95 - 110 mmol/L    CO2 24 23 - 29 mmol/L    BUN, Bld 8 6 - 20 mg/dL    Calcium 7.8 (L) 8.7 - 10.5 mg/dL    Creatinine 0.8 0.5 - 1.4 mg/dL    Albumin 2.1 (L) 3.5 - 5.2 g/dL    Phosphorus 1.7 (L) 2.7 - 4.5 mg/dL    eGFR if African American >60.0 >60 mL/min/1.73 m^2    eGFR if non African American >60.0 >60 mL/min/1.73 m^2    Anion Gap 16 8 - 16 mmol/L   POCT glucose    Collection Time: 03/19/18  3:16 AM   Result Value Ref Range    POCT Glucose 125 (H) 70 - 110 mg/dL   ISTAT PROCEDURE    Collection Time: 03/19/18  3:23 AM   Result Value Ref Range    POC PH 7.346 (L) 7.35 - 7.45    POC PCO2 47.9 (H) 35 - 45 mmHg    POC PO2 24 (LL) 40 - 60 mmHg    POC HCO3 26.2 24 - 28 mmol/L    POC BE 1 -2 to 2 mmol/L    POC SATURATED O2 38 (L) 95 - 100 %    POC TCO2 28 24 - 29 mmol/L    Sample VENOUS     Site Other     Allens Test N/A     DelSys Adult Vent     Mode AC/PRVC    ISTAT PROCEDURE    Collection Time: 03/19/18  3:27 AM   Result Value Ref Range    POC PH 7.439 7.35 - 7.45    POC PCO2 40.8 35 - 45 mmHg    POC PO2 112 (H) 80 - 100 mmHg    POC HCO3 27.6 24 - 28 mmol/L    POC BE 3 -2 to 2 mmol/L    POC SATURATED O2 99 95 - 100 %    POC TCO2 29 (H) 23 - 27 mmol/L    Sample ARTERIAL     Site Ignacio/UAC     Allens Test N/A     DelSys Adult Vent     Mode AC/PRVC    ISTAT PROCEDURE    Collection Time: 03/19/18  3:31 AM   Result Value Ref Range    POC PH 7.398 7.35 - 7.45    POC PCO2 48.3 (H) 35 - 45 mmHg    POC PO2 27 (LL) 40 - 60 mmHg    POC HCO3 29.8 (H) 24 - 28 mmol/L    POC BE 5 -2 to 2 mmol/L    POC SATURATED O2 49 (L) 95 - 100 %    POC TCO2 31 (H) 24 - 29 mmol/L    Sample VENOUS     Site Other     Allens Test N/A     DelSys Adult Vent     Mode AC/PRVC    POCT glucose    Collection Time: 03/19/18  5:51 AM   Result Value Ref  Range    POCT Glucose 151 (H) 70 - 110 mg/dL   Fibrinogen    Collection Time: 03/19/18  5:57 AM   Result Value Ref Range    Fibrinogen <70 (AA) 182 - 366 mg/dL   ISTAT PROCEDURE    Collection Time: 03/19/18  7:56 AM   Result Value Ref Range    POC PH 7.405 7.35 - 7.45    POC PCO2 41.2 35 - 45 mmHg    POC PO2 69 (L) 80 - 100 mmHg    POC HCO3 25.8 24 - 28 mmol/L    POC BE 1 -2 to 2 mmol/L    POC SATURATED O2 94 (L) 95 - 100 %    POC TCO2 27 23 - 27 mmol/L    Rate 21     Sample ARTERIAL     Site Ignacio/UAC     Allens Test N/A     DelSys Adult Vent     Mode AC/PRVC     Vt 400     PEEP 5     FiO2 40    POCT glucose    Collection Time: 03/19/18  8:02 AM   Result Value Ref Range    POCT Glucose 120 (H) 70 - 110 mg/dL   Magnesium    Collection Time: 03/19/18  8:11 AM   Result Value Ref Range    Magnesium 1.5 (L) 1.6 - 2.6 mg/dL   Potassium    Collection Time: 03/19/18  8:11 AM   Result Value Ref Range    Potassium 4.0 3.5 - 5.1 mmol/L   Heparin-induced platelet antibody    Collection Time: 03/19/18 10:06 AM   Result Value Ref Range    Heparin Induced Thrombocytopenia SEE COMMENT (AA)    POCT glucose    Collection Time: 03/19/18 10:06 AM   Result Value Ref Range    POCT Glucose 131 (H) 70 - 110 mg/dL   POCT glucose    Collection Time: 03/19/18 11:17 AM   Result Value Ref Range    POCT Glucose 152 (H) 70 - 110 mg/dL   POCT glucose    Collection Time: 03/19/18 12:18 PM   Result Value Ref Range    POCT Glucose 153 (H) 70 - 110 mg/dL   POCT glucose    Collection Time: 03/19/18  2:11 PM   Result Value Ref Range    POCT Glucose 151 (H) 70 - 110 mg/dL   Renal function panel    Collection Time: 03/19/18  2:13 PM   Result Value Ref Range    Glucose 151 (H) 70 - 110 mg/dL    Sodium 146 (H) 136 - 145 mmol/L    Potassium 4.8 3.5 - 5.1 mmol/L    Chloride 105 95 - 110 mmol/L    CO2 24 23 - 29 mmol/L    BUN, Bld 6 6 - 20 mg/dL    Calcium 8.0 (L) 8.7 - 10.5 mg/dL    Creatinine 0.7 0.5 - 1.4 mg/dL    Albumin 2.3 (L) 3.5 - 5.2 g/dL     Phosphorus 1.4 (L) 2.7 - 4.5 mg/dL    eGFR if African American >60.0 >60 mL/min/1.73 m^2    eGFR if non African American >60.0 >60 mL/min/1.73 m^2    Anion Gap 17 (H) 8 - 16 mmol/L   Fibrinogen    Collection Time: 03/19/18  2:13 PM   Result Value Ref Range    Fibrinogen <70 (AA) 182 - 366 mg/dL   Protime-INR    Collection Time: 03/19/18  2:13 PM   Result Value Ref Range    Prothrombin Time 17.3 (H) 9.0 - 12.5 sec    INR 1.8 (H) 0.8 - 1.2   CBC auto differential    Collection Time: 03/19/18  2:13 PM   Result Value Ref Range    WBC 3.69 (L) 3.90 - 12.70 K/uL    RBC 3.34 (L) 4.60 - 6.20 M/uL    Hemoglobin 9.7 (L) 14.0 - 18.0 g/dL    Hematocrit 29.5 (L) 40.0 - 54.0 %    MCV 88 82 - 98 fL    MCH 29.0 27.0 - 31.0 pg    MCHC 32.9 32.0 - 36.0 g/dL    RDW 16.2 (H) 11.5 - 14.5 %    Platelets 69 (L) 150 - 350 K/uL    MPV 10.4 9.2 - 12.9 fL    Immature Granulocytes CANCELED 0.0 - 0.5 %    Immature Grans (Abs) CANCELED 0.00 - 0.04 K/uL    Lymph # CANCELED 1.0 - 4.8 K/uL    Mono # CANCELED 0.3 - 1.0 K/uL    Eos # CANCELED 0.0 - 0.5 K/uL    Baso # CANCELED 0.00 - 0.20 K/uL    nRBC 0 0 /100 WBC    Gran% 86.0 (H) 38.0 - 73.0 %    Lymph% 7.0 (L) 18.0 - 48.0 %    Mono% 7.0 4.0 - 15.0 %    Eosinophil% 0.0 0.0 - 8.0 %    Basophil% 0.0 0.0 - 1.9 %    Aniso Slight     Poik Slight     Poly Occasional     Ovalocytes Occasional     Tear Drop Cells Occasional     [place] Cells Occasional     Fragmented Cells Occasional     Differential Method Manual    POCT glucose    Collection Time: 03/19/18  4:26 PM   Result Value Ref Range    POCT Glucose 182 (H) 70 - 110 mg/dL         Diagnostic Results:  CXR:  Endotracheal tube tip lies at the level of the apex of the aortic arch, well above the shivani.  No significant interval change in the appearance of the chest since 03/18/2018 is observed.  Small amount of pleural fluid on the right is stable in volume.  No pneumothorax.

## 2018-03-19 NOTE — ASSESSMENT & PLAN NOTE
- Appreciate Hem Cx.   -The patient developed thrombocytopenia 5 days after receiving enoxaparin and has a 4T score of 5 indicating a ~ 14% chance of HIT.  The patients HIT panel was positive with OD of 0.431 indicating a 1-5% chance of having HIT.  Would wait for HILDA before considering anticoagulation with argatroban.   -The patients thrombocytopenia could be drug induced as well.   -The patient has a low fibrinogen, elevated INR, shistocytes on peripheral smear and elevated LDH which all point towards DIC. The haptoglobin is elevated which goes against hemolytic anemia,; however, haptoglobin is also an acute phase reactant.  As DIC is the higher on the differential would treat as such by treating underlying cause which is likely sepsis.       -Check fibrinogen, INR and plt count every 8 hours.  -Would give cryoprecipitate to get fibrinogen over 100.  Would give one unit at a time.  -Would give FFP to get the INR below 1.8.  Would give at least two units at a time.  May consider giving the patient Vitamin K given recent use of coumadin.

## 2018-03-19 NOTE — PROGRESS NOTES
[facility]  Nephrology  Progress Note    Patient Name: Lv Crocker  MRN: [id number]  Admission Date: 3/11/2018  Hospital Length of Stay: 7 days  Attending Provider: Jose A Lloyd MD   Primary Care Physician: Philippe Mohr MD  Principal Problem:Acute respiratory failure with hypoxia    Subjective:     HPI: Mr. Crocker is a 41 yo WM with T1DM, Hashimoto thyroiditis, h/o OHTx 11/2014, and CKD stage 4 who was admitted on 3/11/18 for persistent diarrhea. He reported a 3 week history of diarrhea up to 15x/day. Associated symptoms including URI symptoms, coughing fits, and coughing syncope. Prograf level was 14.3. His post-heart transplant course has been complicated by AMR 4/2015, CMV, post-transplant restrictive cardiomyopathy, recurrent pleural effusions/ascites requiring monthly thoracenteses/paracenteses, VATS 1/11/18 with Pleur-X catheter (now removed), and CKD stage 4 with baseline sCr 2.6-3.0. Baseline -120s and baseline BP 90s-110s/60-70s. Upon admission he was started on IVF and diarrhea workup was initiated. On 3/12 he was noted to have decreased UOP despite fluids; NS was increased to 100cc/hr. He was scheduled for a colonoscopy on 3/13 however procedure was cancelled due to hypoxia and fever. He was transferred to the ICU for closer monitoring. He continued to have oliguria overnight. Bladder scan revealed 200cc of urine but patient refused osullivan catheter placement. Wife reports that patient always has a hard time urinating again after osullivan catheters are placed/removed so he refuses them. He remained hypoxic despite FiO2 70% and ABG revealed combined respiratory and metabolic acidosis with pH 7.17. He was started on BiPAP as well as a bicarb infusion at 50cc/hr. ABG with mild improvement. AM labs revealed K 6.2 and he was shifted and given kayexalate.Trialysis catheter was placed this morning and he subsequently developed hypotension and was started on levophed. He was  intubated later this morning. Nephrology consulted for CACHORRO. All history obtained by primary team and chart review as patient was intubated/sedated on exam. Consent for dialysis obtained by patient's wife and placed in chart. Of note, both of patient's parents were on dialysis.     Interval History:   Remained on SLED over the weekend. Minimal UOP. UF increased yesterday for ARDS-like findings on CXR. No events overnight. Received 1 unit pRBC. CVP 8-10. Net negative 2L yesterday. Hourly intake 40cc/hr this morning with levo, vaso, fentanyl, and insulin. SLED with UF 400cc/hr. CXR worse this AM.     Review of patient's allergies indicates:   Allergen Reactions    No known drug allergies      Current Facility-Administered Medications   Medication Frequency    0.9%  NaCl infusion (CRRT USE ONLY) Continuous    0.9%  NaCl infusion (for blood administration) Q24H PRN    acetaminophen tablet 325 mg Q4H PRN    azithromycin (ZITHROMAX) 500 mg in sodium chloride 0.9% 250 mL IVPB Q24H    chlorhexidine 0.12 % solution 15 mL BID    dexmedetomidine (PRECEDEX) 400mcg/100mL 0.9% NaCL infusion Continuous    dextrose 50% injection 12.5 g PRN    dextrose 50% injection 25 g PRN    docusate 50 mg/5 mL liquid 100 mg BID    escitalopram oxalate tablet 20 mg Daily    famotidine (PF) injection 20 mg Daily    fentaNYL 2500 mcg in 0.9% sodium chloride 250 mL infusion premix (titrating) Continuous    ganciclovir (CYTOVENE) 200 mg in sodium chloride 0.9% 100 mL IVPB Q24H    glucagon (human recombinant) injection 1 mg PRN    glucose chewable tablet 16 g PRN    glucose chewable tablet 24 g PRN    hydrocortisone sodium succinate injection 100 mg Q8H    insulin regular (Humulin R) 100 Units in sodium chloride 0.9% 100 mL infusion Continuous    k phos di & mono-sod phos mono 250 mg tablet 2 tablet Q4H PRN    levothyroxine tablet 125 mcg Before breakfast    norepinephrine 16 mg in dextrose 5 % 250 mL infusion Continuous     piperacillin-tazobactam 4.5 g in sodium chloride 0.9% 100 mL IVPB (ready to mix system) Q8H    polyethylene glycol packet 17 g Daily    posaconazole 300 mg in dextrose 5 % 150 mL infusion Daily    ribavirin capsule 600 mg BID    sodium chloride 0.9% flush 3 mL Q8H    vancomycin 1 gram/250 mL in sodium chloride 0.9% IVPB 1 g Q24H    vasopressin (PITRESSIN) 0.2 Units/mL in dextrose 5 % 100 mL infusion Continuous       Objective:     Vital Signs (Most Recent):  Temp: 97.2 °F (36.2 °C) (03/19/18 0913)  Pulse: 75 (03/19/18 0913)  Resp: (!) 23 (03/18/18 0345)  BP: 126/76 (03/19/18 0800)  SpO2: 100 % (03/19/18 0913)  O2 Device (Oxygen Therapy): ventilator (03/19/18 0913) Vital Signs (24h Range):  Temp:  [96.1 °F (35.6 °C)-99 °F (37.2 °C)] 97.2 °F (36.2 °C)  Pulse:  [] 75  SpO2:  [94 %-100 %] 100 %  BP: ()/(54-76) 126/76  Arterial Line BP: ()/(43-66) 124/60     Weight: 76.3 kg (168 lb 3.4 oz) (03/19/18 0913)  Body mass index is 26.35 kg/m².  Body surface area is 1.9 meters squared.    I/O last 3 completed shifts:  In: 03774.9 [I.V.:8946.9; Blood:1063; NG/GT:95; IV Piggyback:1000]  Out: 65503 [Urine:5; Drains:600; Other:15178]    Physical Exam   Constitutional: He appears well-developed and well-nourished. He is sedated and intubated.   HENT:   Head: Normocephalic and atraumatic.   Neck: Neck supple. No thyromegaly present.   Cardiovascular: Normal rate and regular rhythm.    Pulmonary/Chest: He is intubated. He has no wheezes. He has no rales.   Abdominal: Soft. He exhibits no distension.   Musculoskeletal: He exhibits edema. He exhibits no deformity.   Skin: Skin is warm and dry. He is not diaphoretic.       Significant Labs:  ABGs:   Recent Labs  Lab 03/19/18  0756   PH 7.405   PCO2 41.2   HCO3 25.8   POCSATURATED 94*   BE 1     CBC:   Recent Labs  Lab 03/19/18 0247   WBC 2.26*   RBC 2.23*   HGB 6.3*   HCT 19.7*   PLT 55*   MCV 88   MCH 28.3   MCHC 32.0     CMP:   Recent Labs  Lab 03/19/18  8707  03/19/18  0811   *  125*  --    CALCIUM 7.8*  7.8*  --    ALBUMIN 2.1*  2.1*  --    PROT 5.0*  --      144  --    K 3.6  3.6 4.0   CO2 24  24  --      104  --    BUN 8  8  --    CREATININE 0.8  0.8  --    ALKPHOS 155*  --    ALT 42  --    *  --    BILITOT 2.1*  --      All labs within the past 24 hours have been reviewed.     Significant Imaging:  Labs: Reviewed  X-Ray: Reviewed    Assessment/Plan:     Acute renal failure superimposed on stage 4 chronic kidney disease    - baseline sCr 2.6-3.0 consistent with CKD stage IV  - anuric CACHORRO; likely ischemic ATN from prolonged pre-renal state (diarrhea) in setting of prograf renal vasoconstriction; cannot r/o septic ATN or Prograf toxicity  - SLED started 3/14  - remains anuric  - UF increased yesterday; net negative 2L/24h however CXR worse  - continue SLED today; increase UF goal to 350-450cc/hr as tolerated            Amairani Alegria, PGY-5  Nephrology Fellow  [facility]  Pager: [phone]    Patient seen and examined with Dr Alegria;  I have reviewed and agree with assessment and plan

## 2018-03-19 NOTE — PROGRESS NOTES
"[facility]  Endocrinology  Progress Note    Admit Date: 3/11/2018     Reason for Consult: abnormal TFTs    Surgical Procedure and Date: s/p heart transplant 2014     Diabetes diagnosis year: 5yo (T1DM)     Home Diabetes Medications:    Levemir 15u qHS  Novolog 4u AC     How often checking glucose at home? 4 times daily   BG readings on regimen: 150-200s  Hypoglycemia on the regimen?  Yes, rarely - treats appropriately (light snack, glucose tab)  Missed doses on regimen?  No        Diabetes Complications include:     Hyperglycemia, Hypoglycemia  and Diabetic chronic kidney disease          Complicating diabetes co morbidities:   N/A     HPI:   Patient is a 42 y.o. male with a diagnosis of T1DM, HTN, hypothyroidism, s/p heart transplant.  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 He required serial paracentesis and thoracentesis until he underwent a VATS with pleurX catheter placement on 1/11/2018 and removed 2/7/18.       Presented to hospital secondary to diarrhea and cough.  Upon initial labs, TSH was decreased (0.101) and free T4 1.33.  Endocrinology consulted to assist in management of these labs.  He was last seen in clinic by Kamala Vázquez NP 11/2017.   Previous hospitalization, endocrine consulted for same problem.  During that visit, recommended decreasing LT4 to 125mcg daily. Unfortunately, patient was discharged on previous dose of 150mcg daily.     Interval HPI:   Overnight events:  Remains intubated and sedated, on pressors.   Getting CRRT.   PRBC for low H/H.   Remains on HC 100mg q8.   BG at goal on minimal insulin requirements on intensive gtt.   Getting LT4 125mcg per NGT    /76 (BP Location: Right arm, Patient Position: Lying)   Pulse 75   Temp 97.2 °F (36.2 °C)   Resp (!) 23   Ht 5' 7" (1.702 m)   Wt 76.3 kg (168 lb 3.4 oz)   SpO2 100%   BMI 26.35 kg/m²   "     Labs Reviewed and Include      Recent Labs  Lab 03/19/18  0247 03/19/18  0811   *  125*  --    CALCIUM 7.8*  7.8*  --    ALBUMIN 2.1*  2.1*  --    PROT 5.0*  --      144  --    K 3.6  3.6 4.0   CO2 24  24  --      104  --    BUN 8  8  --    CREATININE 0.8  0.8  --    ALKPHOS 155*  --    ALT 42  --    *  --    BILITOT 2.1*  --      Lab Results   Component Value Date    WBC 2.26 (L) 03/19/2018    HGB 6.3 (L) 03/19/2018    HCT 19.7 (LL) 03/19/2018    MCV 88 03/19/2018    PLT 55 (L) 03/19/2018     No results for input(s): TSH, FREET4 in the last 168 hours.  Lab Results   Component Value Date    HGBA1C 6.9 (H) 02/11/2018       Nutritional status:   Body mass index is 26.35 kg/m².  Lab Results   Component Value Date    ALBUMIN 2.1 (L) 03/19/2018    ALBUMIN 2.1 (L) 03/19/2018    ALBUMIN 2.2 (L) 03/18/2018     Lab Results   Component Value Date    PREALBUMIN 5 (L) 03/15/2018    PREALBUMIN 18 (L) 03/24/2015    PREALBUMIN 19 (L) 12/17/2014       Estimated Creatinine Clearance: 110.2 mL/min (based on SCr of 0.8 mg/dL).    Accu-Checks  Recent Labs      03/18/18   1205  03/18/18   1357  03/18/18   1619  03/18/18   1805  03/18/18   1959  03/18/18   2148  03/19/18   0020  03/19/18   0316  03/19/18   0551  03/19/18   0802   POCTGLUCOSE  205*  206*  191*  190*  157*  155*  142*  125*  151*  120*       Current Medications and/or Treatments Impacting Glycemic Control  Immunotherapy:  Immunosuppressants     None        Steroids:   Hormones     Start     Stop Route Frequency Ordered    03/17/18 1530  vasopressin (PITRESSIN) 0.2 Units/mL in dextrose 5 % 100 mL infusion      -- IV Continuous 03/17/18 1424    03/15/18 1400  hydrocortisone sodium succinate injection 100 mg      -- IV Every 8 hours 03/15/18 1224        Pressors:    Autonomic Drugs     Start     Stop Route Frequency Ordered    03/14/18 1045  norepinephrine 16 mg in dextrose 5 % 250 mL infusion     Question Answer Comment   Titrate by:  (in mcg/kg/min) 0.02    Titrate interval: (in minutes) 2    Titrate to maintain: (MAP or SBP) MAP    Greater than: (in mmHg) 60    Maximum dose: (in mcg/kg/min) 3        -- IV Continuous 03/14/18 0937        Hyperglycemia/Diabetes Medications: Antihyperglycemics     Start     Stop Route Frequency Ordered    03/14/18 1630  insulin regular (Humulin R) 100 Units in sodium chloride 0.9% 100 mL infusion     Question:  Insulin Rate Adjustment (DO NOT MODIFY ANSWER)  Answer:  [email or web address]    -- IV Continuous 03/14/18 1530          ASSESSMENT and PLAN    * Acute respiratory failure with hypoxia    Per primary        Type 1 diabetes mellitus with hyperglycemia    BG goal 140-180  Continue intensive insulin gtt q2hr checks while remains intubated.   Once stable will switch to transition.   Do not suspend gtt >1 hr, pt is T1DM.    Low c peptide with glc 164. Treat as type 1.   Neg MODE 2013, neg islet cell ab on this admission.   Cannot order insulin ab while on insulin, and ZnT8 unavailable in labs.          Hypothyroidism due to Hashimoto's thyroiditis    Abnormal TFTs upon admit.  Unclear if secondary to illness, but with evidence of iatrogenic hyperthyroidism in past while on above weight based dose.     Continue LT4 ng 125mcg daily (decreased from 150mcg)  Repeat TFTs in 4 weeks (due ~4/15)          Acute renal failure superimposed on stage 4 chronic kidney disease    Avoid insulin stacking          Heart transplanted    Per transplant  Avoid hypoglycemia          Current use of steroid medication    Chronic steroids PDN 2.5mg o/p.   Agree with stress dose steroids during critical illness             Palmira Dorsey MD  Endocrinology  [facility]

## 2018-03-20 PROBLEM — D72.819 LEUKOPENIA: Status: RESOLVED | Noted: 2018-01-01 | Resolved: 2018-01-01

## 2018-03-20 PROBLEM — K56.7 ILEUS: Status: ACTIVE | Noted: 2018-01-01

## 2018-03-20 NOTE — PROGRESS NOTES
[facility]  Nephrology  Progress Note    Patient Name: Lv Crocker  MRN: [id number]  Admission Date: 3/11/2018  Hospital Length of Stay: 8 days  Attending Provider: Jose A Lloyd MD   Primary Care Physician: Philippe Mohr MD  Principal Problem:Acute respiratory failure with hypoxia    Subjective:     HPI: Mr. Crocker is a 41 yo WM with T1DM, Hashimoto thyroiditis, h/o OHTx 11/2014, and CKD stage 4 who was admitted on 3/11/18 for persistent diarrhea. He reported a 3 week history of diarrhea up to 15x/day. Associated symptoms including URI symptoms, coughing fits, and coughing syncope. Prograf level was 14.3. His post-heart transplant course has been complicated by AMR 4/2015, CMV, post-transplant restrictive cardiomyopathy, recurrent pleural effusions/ascites requiring monthly thoracenteses/paracenteses, VATS 1/11/18 with Pleur-X catheter (now removed), and CKD stage 4 with baseline sCr 2.6-3.0. Baseline -120s and baseline BP 90s-110s/60-70s. Upon admission he was started on IVF and diarrhea workup was initiated. On 3/12 he was noted to have decreased UOP despite fluids; NS was increased to 100cc/hr. He was scheduled for a colonoscopy on 3/13 however procedure was cancelled due to hypoxia and fever. He was transferred to the ICU for closer monitoring. He continued to have oliguria overnight. Bladder scan revealed 200cc of urine but patient refused osullivan catheter placement. Wife reports that patient always has a hard time urinating again after osullivan catheters are placed/removed so he refuses them. He remained hypoxic despite FiO2 70% and ABG revealed combined respiratory and metabolic acidosis with pH 7.17. He was started on BiPAP as well as a bicarb infusion at 50cc/hr. ABG with mild improvement. AM labs revealed K 6.2 and he was shifted and given kayexalate.Trialysis catheter was placed this morning and he subsequently developed hypotension and was started on levophed. He was  intubated later this morning. Nephrology consulted for CACHORRO. All history obtained by primary team and chart review as patient was intubated/sedated on exam. Consent for dialysis obtained by patient's wife and placed in chart. Of note, both of patient's parents were on dialysis.     Interval History: received 2 units FFP, 1 unit blood, 1 unit cryo yesterday. No events overnight. Net negative 2.5L yesterday. Remains on SLED with UF 450cc/hr. Minimal vent requirements. Hourly intake 40cc/hr with levo, vaso, precedex, and insulin. Rising AGMA.     Review of patient's allergies indicates:   Allergen Reactions    No known drug allergies      Current Facility-Administered Medications   Medication Frequency    0.9%  NaCl infusion (CRRT USE ONLY) Continuous    0.9%  NaCl infusion (for blood administration) Q24H PRN    0.9%  NaCl infusion (for blood administration) Q24H PRN    0.9%  NaCl infusion (for blood administration) Q24H PRN    acetaminophen tablet 325 mg Q4H PRN    chlorhexidine 0.12 % solution 15 mL BID    dexmedetomidine (PRECEDEX) 400mcg/100mL 0.9% NaCL infusion Continuous    dextrose 50% injection 12.5 g PRN    dextrose 50% injection 25 g PRN    docusate 50 mg/5 mL liquid 100 mg BID    escitalopram oxalate tablet 20 mg Daily    famotidine (PF) injection 20 mg Daily    fentaNYL 2500 mcg in 0.9% sodium chloride 250 mL infusion premix (titrating) Continuous    ganciclovir (CYTOVENE) 200 mg in sodium chloride 0.9% 100 mL IVPB Q24H    glucagon (human recombinant) injection 1 mg PRN    glucose chewable tablet 16 g PRN    glucose chewable tablet 24 g PRN    hydrocortisone sodium succinate injection 100 mg Q8H    insulin regular (Humulin R) 100 Units in sodium chloride 0.9% 100 mL infusion Continuous    k phos di & mono-sod phos mono 250 mg tablet 2 tablet Q4H PRN    levothyroxine tablet 125 mcg Before breakfast    magnesium sulfate 2 g in dextrose 5% 50 ml IVPB PRN    norepinephrine 16 mg in  dextrose 5 % 250 mL infusion Continuous    piperacillin-tazobactam 4.5 g in sodium chloride 0.9% 100 mL IVPB (ready to mix system) Q8H    polyethylene glycol packet 17 g Daily    posaconazole 200 mg/5ml (40 mg/ml) suspension 200 mg TID WM    ribavirin capsule 600 mg BID    sodium chloride 0.9% flush 3 mL Q8H    vancomycin 1 gram/250 mL in sodium chloride 0.9% IVPB 1 g Q24H    vasopressin (PITRESSIN) 0.2 Units/mL in dextrose 5 % 100 mL infusion Continuous       Objective:     Vital Signs (Most Recent):  Temp: 97.9 °F (36.6 °C) (03/20/18 1043)  Pulse: 92 (03/20/18 1043)  Resp: (!) 23 (03/20/18 0911)  BP: (!) 98/49 (03/20/18 1000)  SpO2: 100 % (03/20/18 1043)  O2 Device (Oxygen Therapy): ventilator (03/20/18 1043) Vital Signs (24h Range):  Temp:  [96.8 °F (36 °C)-98.1 °F (36.7 °C)] 97.9 °F (36.6 °C)  Pulse:  [70-92] 92  Resp:  [23] 23  SpO2:  [95 %-100 %] 100 %  BP: ()/(49-60) 98/49  Arterial Line BP: ()/(40-71) 111/44     Weight: 76.3 kg (168 lb 3.4 oz) (03/19/18 0913)  Body mass index is 26.35 kg/m².  Body surface area is 1.9 meters squared.    I/O last 3 completed shifts:  In: 78306.7 [I.V.:8047.2; Blood:1215.5; NG/GT:250; IV Piggyback:1250]  Out: 20247 [Drains:200; Other:70161]    Physical Exam   Constitutional: He appears well-developed and well-nourished. He is sedated and intubated.   HENT:   Head: Normocephalic and atraumatic.   Neck: Neck supple. No thyromegaly present.   Cardiovascular: Normal rate and regular rhythm.    Pulmonary/Chest: He is intubated. He has no wheezes. He has no rales.   Abdominal: Soft. He exhibits no distension.   Musculoskeletal: He exhibits no edema or deformity.   Skin: Skin is warm and dry. He is not diaphoretic.       Significant Labs:  ABGs:   Recent Labs  Lab 03/20/18  0438   PH 7.325*   PCO2 35.5   HCO3 18.5*   POCSATURATED 98   BE -8     CBC:   Recent Labs  Lab 03/20/18  0821   WBC 4.30   RBC 3.17*   HGB 9.2*   HCT 28.6*   PLT 59*   MCV 90   MCH 29.0   MCHC  32.2     CMP:   Recent Labs  Lab 03/20/18  0438   *  153*   CALCIUM 8.3*  8.3*   ALBUMIN 2.6*  2.6*   PROT 5.9*     143   K 4.8  4.8   CO2 18*  18*     105   BUN 10  10   CREATININE 0.9  0.9   ALKPHOS 165*   ALT 51*   *   BILITOT 2.6*     All labs within the past 24 hours have been reviewed.     Significant Imaging:  Labs: Reviewed  X-Ray: Reviewed    Assessment/Plan:     Acute renal failure superimposed on stage 4 chronic kidney disease    - baseline sCr 2.6-3.0 consistent with CKD stage IV  - anuric CACHORRO; likely ischemic ATN from prolonged pre-renal state (diarrhea) in setting of prograf renal vasoconstriction; cannot r/o septic ATN or Prograf toxicity  - SLED started 3/14  - remains anuric  - net negative 2.5L yesterday (negative 4.5L/48h). Minimal vent requirements. Hourly intake ~40cc/hr  - worsening AGMA. Ordered lactate and beta-OH (starvation ketosis?).   - continue SLED today for clearance. Higher bicarb dialysate bath today given drop in pH and HCO3. Will decrease UF goal to match I/O: 300-350cc/hr            Amairani Alegria, PGY-5  Nephrology Fellow  [facility]  Pager: [phone]    Patient seen and examined with Dr Alegria;   I have reviewed and agree with assessment and plan

## 2018-03-20 NOTE — PROGRESS NOTES
UPDATE    SW to pt's room for update. Pt's wife and other family at bedside. Pt intubated and sedated. Pt's wife reports she is feeling better today. Wife states pt seems to be doing a little better and she is happy to have more family around. No needs reported at this time. SW providing psychosocial and counseling support, education, resources, and d/c planning as needed. SW continuing to follow and remains available.

## 2018-03-20 NOTE — SUBJECTIVE & OBJECTIVE
Interval History: received 2 units FFP, 1 unit blood, 1 unit cryo yesterday. No events overnight. Net negative 2.5L yesterday. Remains on SLED with UF 450cc/hr. Minimal vent requirements. Hourly intake 40cc/hr with levo, vaso, precedex, and insulin. Rising AGMA.     Review of patient's allergies indicates:   Allergen Reactions    No known drug allergies      Current Facility-Administered Medications   Medication Frequency    0.9%  NaCl infusion (CRRT USE ONLY) Continuous    0.9%  NaCl infusion (for blood administration) Q24H PRN    0.9%  NaCl infusion (for blood administration) Q24H PRN    0.9%  NaCl infusion (for blood administration) Q24H PRN    acetaminophen tablet 325 mg Q4H PRN    chlorhexidine 0.12 % solution 15 mL BID    dexmedetomidine (PRECEDEX) 400mcg/100mL 0.9% NaCL infusion Continuous    dextrose 50% injection 12.5 g PRN    dextrose 50% injection 25 g PRN    docusate 50 mg/5 mL liquid 100 mg BID    escitalopram oxalate tablet 20 mg Daily    famotidine (PF) injection 20 mg Daily    fentaNYL 2500 mcg in 0.9% sodium chloride 250 mL infusion premix (titrating) Continuous    ganciclovir (CYTOVENE) 200 mg in sodium chloride 0.9% 100 mL IVPB Q24H    glucagon (human recombinant) injection 1 mg PRN    glucose chewable tablet 16 g PRN    glucose chewable tablet 24 g PRN    hydrocortisone sodium succinate injection 100 mg Q8H    insulin regular (Humulin R) 100 Units in sodium chloride 0.9% 100 mL infusion Continuous    k phos di & mono-sod phos mono 250 mg tablet 2 tablet Q4H PRN    levothyroxine tablet 125 mcg Before breakfast    magnesium sulfate 2 g in dextrose 5% 50 ml IVPB PRN    norepinephrine 16 mg in dextrose 5 % 250 mL infusion Continuous    piperacillin-tazobactam 4.5 g in sodium chloride 0.9% 100 mL IVPB (ready to mix system) Q8H    polyethylene glycol packet 17 g Daily    posaconazole 200 mg/5ml (40 mg/ml) suspension 200 mg TID WM    ribavirin capsule 600 mg BID    sodium  chloride 0.9% flush 3 mL Q8H    vancomycin 1 gram/250 mL in sodium chloride 0.9% IVPB 1 g Q24H    vasopressin (PITRESSIN) 0.2 Units/mL in dextrose 5 % 100 mL infusion Continuous       Objective:     Vital Signs (Most Recent):  Temp: 97.9 °F (36.6 °C) (03/20/18 1043)  Pulse: 92 (03/20/18 1043)  Resp: (!) 23 (03/20/18 0911)  BP: (!) 98/49 (03/20/18 1000)  SpO2: 100 % (03/20/18 1043)  O2 Device (Oxygen Therapy): ventilator (03/20/18 1043) Vital Signs (24h Range):  Temp:  [96.8 °F (36 °C)-98.1 °F (36.7 °C)] 97.9 °F (36.6 °C)  Pulse:  [70-92] 92  Resp:  [23] 23  SpO2:  [95 %-100 %] 100 %  BP: ()/(49-60) 98/49  Arterial Line BP: ()/(40-71) 111/44     Weight: 76.3 kg (168 lb 3.4 oz) (03/19/18 0913)  Body mass index is 26.35 kg/m².  Body surface area is 1.9 meters squared.    I/O last 3 completed shifts:  In: 61742.7 [I.V.:8047.2; Blood:1215.5; NG/GT:250; IV Piggyback:1250]  Out: 23345 [Drains:200; Other:87541]    Physical Exam   Constitutional: He appears well-developed and well-nourished. He is sedated and intubated.   HENT:   Head: Normocephalic and atraumatic.   Neck: Neck supple. No thyromegaly present.   Cardiovascular: Normal rate and regular rhythm.    Pulmonary/Chest: He is intubated. He has no wheezes. He has no rales.   Abdominal: Soft. He exhibits no distension.   Musculoskeletal: He exhibits no edema or deformity.   Skin: Skin is warm and dry. He is not diaphoretic.       Significant Labs:  ABGs:   Recent Labs  Lab 03/20/18  0438   PH 7.325*   PCO2 35.5   HCO3 18.5*   POCSATURATED 98   BE -8     CBC:   Recent Labs  Lab 03/20/18  0821   WBC 4.30   RBC 3.17*   HGB 9.2*   HCT 28.6*   PLT 59*   MCV 90   MCH 29.0   MCHC 32.2     CMP:   Recent Labs  Lab 03/20/18  0438   *  153*   CALCIUM 8.3*  8.3*   ALBUMIN 2.6*  2.6*   PROT 5.9*     143   K 4.8  4.8   CO2 18*  18*     105   BUN 10  10   CREATININE 0.9  0.9   ALKPHOS 165*   ALT 51*   *   BILITOT 2.6*     All labs  within the past 24 hours have been reviewed.     Significant Imaging:  Labs: Reviewed  X-Ray: Reviewed

## 2018-03-20 NOTE — ASSESSMENT & PLAN NOTE
-Continue Levo for MAP 60. Continue Vaso 0.04u/hr.  -Continue stress dose hydrocortisone 100mg TID.  -Appreciate ID assistance. Abx adjustments as above.

## 2018-03-20 NOTE — SUBJECTIVE & OBJECTIVE
Interval History: No issues overnight. Intubated/sedated.     Continuous Infusions:   sodium chloride 0.9% 200 mL/hr at 03/19/18 2303    dexmedetomidine (PRECEDEX) infusion 0.6 mcg/kg/hr (03/20/18 0754)    fentanyl Stopped (03/20/18 0803)    insulin (HUMAN R) infusion (adults) 0.1 Units/hr (03/20/18 0700)    norepinephrine bitartrate-D5W 0.1 mcg/kg/min (03/20/18 0750)    vasopressin (PITRESSIN) infusion 0.04 Units/min (03/20/18 0700)     Scheduled Meds:   chlorhexidine  15 mL Mouth/Throat BID    docusate  100 mg Per NG tube BID    escitalopram oxalate  20 mg Per NG tube Daily    famotidine (PF)  20 mg Intravenous Daily    ganciclovir (CYTOVENE) IVPB  200 mg Intravenous Q24H    hydrocortisone sodium succinate  100 mg Intravenous Q8H    levothyroxine  125 mcg Per NG tube Before breakfast    piperacillin-tazobactam (ZOSYN) IVPB  4.5 g Intravenous Q8H    polyethylene glycol  17 g Per NG tube Daily    posaconazole 200 mg/5ml (40 mg/ml)  200 mg Per NG tube TID WM    ribavirin  600 mg Oral BID    sodium chloride 0.9%  3 mL Intravenous Q8H    vancomycin (VANCOCIN) IVPB  1,000 mg Intravenous Q24H     PRN Meds:sodium chloride, sodium chloride, sodium chloride, acetaminophen, dextrose 50%, dextrose 50%, glucagon (human recombinant), glucose, glucose, k phos di & mono-sod phos mono, magnesium sulfate IVPB    Review of patient's allergies indicates:   Allergen Reactions    No known drug allergies      Objective:     Vital Signs (Most Recent):  Temp: 97.7 °F (36.5 °C) (03/20/18 0732)  Pulse: 77 (03/20/18 0732)  Resp: (!) 23 (03/18/18 0345)  BP: (!) 106/53 (03/20/18 0732)  SpO2: 100 % (03/20/18 0732) Vital Signs (24h Range):  Temp:  [96.8 °F (36 °C)-98.1 °F (36.7 °C)] 97.7 °F (36.5 °C)  Pulse:  [70-80] 77  SpO2:  [95 %-100 %] 100 %  BP: ()/(49-68) 106/53  Arterial Line BP: ()/(41-71) 107/49     Patient Vitals for the past 72 hrs (Last 3 readings):   Weight   03/19/18 0913 76.3 kg (168 lb 3.4 oz)      Body mass index is 26.35 kg/m².      Intake/Output Summary (Last 24 hours) at 03/20/18 0811  Last data filed at 03/20/18 0800   Gross per 24 hour   Intake          7173.22 ml   Output             9474 ml   Net         -2300.78 ml        Telemetry: ST  Physical Exam   Constitutional: He appears well-developed and well-nourished.   Appears ill   HENT:   Head: Normocephalic and atraumatic.   Eyes: Conjunctivae are normal. Pupils are equal, round, and reactive to light.   Neck: No thyromegaly present.   RIJ trialysis   Cardiovascular: Regular rhythm.    Tachycardic   Pulmonary/Chest:   Intubated and ventilated  Breath sounds coarse bilaterally   Abdominal: Soft.   No bowel sounds. + ascites   Musculoskeletal:   1-2+ BUE edema; no MITZY; no cyanosis   Neurological:   Arouses and becomes agitated with sedation breaks   Skin: Skin is warm and dry. Capillary refill takes less than 2 seconds. There is pallor.       Significant Labs:  CBC:    Recent Labs  Lab 03/19/18  0247 03/19/18  1413 03/19/18  2300   WBC 2.26* 3.69* 3.47*   RBC 2.23* 3.34* 3.14*   HGB 6.3* 9.7* 9.0*   HCT 19.7* 29.5* 28.3*   PLT 55* 69* 68*   MCV 88 88 90   MCH 28.3 29.0 28.7   MCHC 32.0 32.9 31.8*     BNP:    Recent Labs  Lab 03/13/18  0815   *     CMP:    Recent Labs  Lab 03/18/18  0411  03/19/18  0247  03/19/18  1413 03/19/18  2300 03/20/18  0438   *  157*  < > 125*  125*  --  151* 174* 153*  153*   CALCIUM 8.3*  8.3*  < > 7.8*  7.8*  --  8.0* 8.3* 8.3*  8.3*   ALBUMIN 2.3*  2.3*  < > 2.1*  2.1*  --  2.3* 2.6* 2.6*  2.6*   PROT 5.8*  --  5.0*  --   --   --  5.9*     144  < > 144  144  --  146* 143 143  143   K 5.0  5.0  < > 3.6  3.6  < > 4.8 4.2 4.8  4.8   CO2 21*  21*  < > 24  24  --  24 20* 18*  18*     103  < > 104  104  --  105 105 105  105   BUN 8  8  < > 8  8  --  6 12 10  10   CREATININE 0.8  0.8  < > 0.8  0.8  --  0.7 0.9 0.9  0.9   ALKPHOS 187*  --  155*  --   --   --  165*   ALT 33  --   42  --   --   --  51*   *  --  123*  --   --   --  124*   BILITOT 2.1*  --  2.1*  --   --   --  2.6*   < > = values in this interval not displayed.   Coagulation:     Recent Labs  Lab 03/18/18  0929 03/19/18  1413 03/19/18  2300   INR 2.1* 1.8* 1.4*     LDH:    Recent Labs  Lab 03/18/18  2222   *     Microbiology:  Microbiology Results (last 7 days)     Procedure Component Value Units Date/Time    Blood culture [[id number]] Collected:  03/18/18 0618    Order Status:  Completed Specimen:  Blood from Peripheral, Hand, Left Updated:  03/20/18 0804     Blood Culture, Routine Gram stain aer bottle: Gram positive cocci in clusters resembling Staph      Blood Culture, Routine Results called to and read back by:Omid Cody RN 03/19/2018  10:55    Blood culture [[id number]] Collected:  03/18/18 0748    Order Status:  Completed Specimen:  Blood from Peripheral, Hand, Right Updated:  03/19/18 1012     Blood Culture, Routine No Growth to date     Blood Culture, Routine No Growth to date    Blood culture [[id number]] Collected:  03/13/18 1637    Order Status:  Completed Specimen:  Blood from Peripheral, Antecubital, Left Updated:  03/18/18 2012     Blood Culture, Routine No growth after 5 days.    Blood culture [[id number]] Collected:  03/13/18 1701    Order Status:  Completed Specimen:  Blood from Peripheral, Forearm, Left Updated:  03/18/18 2012     Blood Culture, Routine No growth after 5 days.    Blood culture [[id number]]     Order Status:  Canceled Specimen:  Blood     Blood culture [[id number]]     Order Status:  Canceled Specimen:  Blood     Culture, Respiratory with Gram Stain [[id number]] Collected:  03/15/18 1541    Order Status:  Completed Specimen:  Respiratory from Sputum Updated:  03/17/18 0914     Respiratory Culture No growth     Gram Stain (Respiratory) <10 epithelial cells per low power field.     Gram Stain (Respiratory) Rare WBC's      Gram Stain (Respiratory) No organisms seen    Culture, Respiratory  with Gram Stain [[id number]] Collected:  03/14/18 1137    Order Status:  Completed Specimen:  Respiratory from BAL, TRIPP Updated:  03/16/18 1046     Respiratory Culture No growth     Gram Stain (Respiratory) No WBC's     Gram Stain (Respiratory) No organisms seen    AFB Culture & Smear [[id number]] Collected:  03/14/18 1137    Order Status:  Completed Specimen:  Bronchial Wash from Amniotic Fluid Updated:  03/15/18 2127     AFB Culture & Smear Culture in progress     AFB CULTURE STAIN No acid fast bacilli seen.    Respiratory Viral Panel by PCR [facility]; Nasal Swab [[id number]]  (Abnormal) Collected:  03/13/18 1733    Order Status:  Completed Specimen:  Respiratory Updated:  03/15/18 1302     Respiratory Virus Panel, source Nasal Swab     RVP - Adenovirus Not Detected     Comment: Detects Serotypes B and E. Detection of Serotype C may   be limited. If Adenovirus infection is suspected and a   Not Detected result is returned the sample should be   re-tested for Adenovirus using an independent method  (e.g. Tactile Adenovirus Quantitative Real-Time  PCR test.          Enterovirus Not Detected     Comment: Cross-reactivity has been observed between certain Rhinovirus  strains and the Enterovirus assay.          Human Bocavirus Not Detected     Human Coronavirus Not Detected     Comment: The Human Coronavirus assay detects Human coronavirus types  229E, OC43,NL63 and HKU1.          RVP - Human Metapneumovirus (hMPV) Not Detected     RVP - Influenza A Not Detected     Influenza A - [id number] Not Detected     RVP - Influenza B Not Detected     Parainfluenza Not Detected     Respiratory Syncytial VirusVirus (RSV) A Positive (A)     Comment: The Respiratory Syncytial Viral assay detects types A and B,  however it does not distinguish between the two.          RVP - Rhinovirus Not Detected     Comment: Cross-Reactivity has been observed between certain   Rhinovirus strains and the Enterovirus assay.  Target Enriched  Mulitplex Polymerase Chain Reaction (TEM-PCR)  allows for the detection of multiple pathogens out of a single  reaction.  This test was developed and its performance   characteristics determined by [employer].  It has not   been cleared or approved by the [place]Food and Drug Administration.  Results should be used in conjunction with clinical findings,   and should not form the sole basis for a diagnosis or treatment  decision.  TEM-PCR is a licensed technology of [employer].         Narrative:       Receiving Lab:->[facility]    Culture, Respiratory with Gram Stain [[id number]] Collected:  03/13/18 1441    Order Status:  Completed Specimen:  Respiratory from Sputum, Expectorated Updated:  03/15/18 1237     Respiratory Culture Normal respiratory hank     Gram Stain (Respiratory) <10 epithelial cells per low power field.     Gram Stain (Respiratory) Rare WBC's     Gram Stain (Respiratory) Moderate Gram positive cocci     Gram Stain (Respiratory) Many Gram negative rods    Fungus culture [[id number]] Collected:  03/14/18 1137    Order Status:  Completed Specimen:  Bronchial Wash from Amniotic Fluid Updated:  03/15/18 1124     Fungus (Mycology) Culture Culture in progress    Stool culture **CANNOT BE ORDERED STAT** [[id number]] Collected:  03/11/18 1923    Order Status:  Completed Specimen:  Stool from Stool Updated:  03/14/18 1549     Stool Culture No enteric hank     Stool Culture No Salmonella,Shigella,Vibrio,Campylobacter,Yersinia isolated.    Culture, Fluid  (Aerobic) [[id number]] Collected:  03/14/18 1137    Order Status:  Canceled Specimen:  Bronchial Wash from Amniotic Fluid Updated:  03/14/18 1416    Cryptococcal antigen [[id number]] Collected:  03/14/18 0425    Order Status:  Completed Specimen:  Blood from Blood Updated:  03/14/18 1217     Cryptococcal Ag, Blood Negative    Culture, Respiratory with Gram Stain [[id number]] Collected:  03/13/18 1701    Order Status:  Completed Specimen:   Respiratory from Sputum, Expectorated Updated:  03/13/18 2069     Respiratory Culture Specimen inadequate - culture not performed     Gram Stain (Respiratory) >10epis/lfp and <than many WBC's     Gram Stain (Respiratory) Predominance of oropharyngeal hank. Please recollect.          I have reviewed all pertinent labs within the past 24 hours.    Estimated Creatinine Clearance: 97.9 mL/min (based on SCr of 0.9 mg/dL).    Diagnostic Results:  I have reviewed all pertinent imaging results/findings within the past 24 hours.

## 2018-03-20 NOTE — PROGRESS NOTES
Dictation #1  MRN:[id number]  CSN:[id number]  Dictation #2  MRN:[id number]  CSN:[id number]  [facility]  Heart Transplant  Progress Note      Patient Name: Lv Crocker  MRN: [id number]  Admission Date: 3/11/2018  Hospital Length of Stay: 8 days  Attending Physician: Jose A Lloyd MD  Primary Care Provider: Philippe Mohr MD  Principal Problem:Acute respiratory failure with hypoxia    Subjective:     Interval History: No issues overnight. Intubated/sedated.     Continuous Infusions:   sodium chloride 0.9% 200 mL/hr at 03/19/18 2303    dexmedetomidine (PRECEDEX) infusion 0.6 mcg/kg/hr (03/20/18 0754)    fentanyl Stopped (03/20/18 0803)    insulin (HUMAN R) infusion (adults) 0.1 Units/hr (03/20/18 0700)    norepinephrine bitartrate-D5W 0.1 mcg/kg/min (03/20/18 0750)    vasopressin (PITRESSIN) infusion 0.04 Units/min (03/20/18 0700)     Scheduled Meds:   chlorhexidine  15 mL Mouth/Throat BID    docusate  100 mg Per NG tube BID    escitalopram oxalate  20 mg Per NG tube Daily    famotidine (PF)  20 mg Intravenous Daily    ganciclovir (CYTOVENE) IVPB  200 mg Intravenous Q24H    hydrocortisone sodium succinate  100 mg Intravenous Q8H    levothyroxine  125 mcg Per NG tube Before breakfast    piperacillin-tazobactam (ZOSYN) IVPB  4.5 g Intravenous Q8H    polyethylene glycol  17 g Per NG tube Daily    posaconazole 200 mg/5ml (40 mg/ml)  200 mg Per NG tube TID WM    ribavirin  600 mg Oral BID    sodium chloride 0.9%  3 mL Intravenous Q8H    vancomycin (VANCOCIN) IVPB  1,000 mg Intravenous Q24H     PRN Meds:sodium chloride, sodium chloride, sodium chloride, acetaminophen, dextrose 50%, dextrose 50%, glucagon (human recombinant), glucose, glucose, k phos di & mono-sod phos mono, magnesium sulfate IVPB    Review of patient's allergies indicates:   Allergen Reactions    No known drug allergies      Objective:     Vital Signs (Most Recent):  Temp: 97.7 °F (36.5 °C) (03/20/18 0732)  Pulse:  77 (03/20/18 0732)  Resp: (!) 23 (03/18/18 0345)  BP: (!) 106/53 (03/20/18 0732)  SpO2: 100 % (03/20/18 0732) Vital Signs (24h Range):  Temp:  [96.8 °F (36 °C)-98.1 °F (36.7 °C)] 97.7 °F (36.5 °C)  Pulse:  [70-80] 77  SpO2:  [95 %-100 %] 100 %  BP: ()/(49-68) 106/53  Arterial Line BP: ()/(41-71) 107/49     Patient Vitals for the past 72 hrs (Last 3 readings):   Weight   03/19/18 0913 76.3 kg (168 lb 3.4 oz)     Body mass index is 26.35 kg/m².      Intake/Output Summary (Last 24 hours) at 03/20/18 0811  Last data filed at 03/20/18 0800   Gross per 24 hour   Intake          7173.22 ml   Output             9474 ml   Net         -2300.78 ml        Telemetry: ST  Physical Exam   Constitutional: He appears well-developed and well-nourished.   Appears ill   HENT:   Head: Normocephalic and atraumatic.   Eyes: Conjunctivae are normal. Pupils are equal, round, and reactive to light.   Neck: No thyromegaly present.   RIJ trialysis   Cardiovascular: Regular rhythm.    Tachycardic   Pulmonary/Chest:   Intubated and ventilated  Breath sounds coarse bilaterally   Abdominal: Soft.   No bowel sounds. + ascites   Musculoskeletal:   1-2+ BUE edema; no MITZY; no cyanosis   Neurological:   Arouses and becomes agitated with sedation breaks   Skin: Skin is warm and dry. Capillary refill takes less than 2 seconds. There is pallor.       Significant Labs:  CBC:    Recent Labs  Lab 03/19/18  0247 03/19/18  1413 03/19/18  2300   WBC 2.26* 3.69* 3.47*   RBC 2.23* 3.34* 3.14*   HGB 6.3* 9.7* 9.0*   HCT 19.7* 29.5* 28.3*   PLT 55* 69* 68*   MCV 88 88 90   MCH 28.3 29.0 28.7   MCHC 32.0 32.9 31.8*     BNP:    Recent Labs  Lab 03/13/18  0815   *     CMP:    Recent Labs  Lab 03/18/18  0411  03/19/18  0247  03/19/18  1413 03/19/18  2300 03/20/18  0438   *  157*  < > 125*  125*  --  151* 174* 153*  153*   CALCIUM 8.3*  8.3*  < > 7.8*  7.8*  --  8.0* 8.3* 8.3*  8.3*   ALBUMIN 2.3*  2.3*  < > 2.1*  2.1*  --  2.3* 2.6*  2.6*  2.6*   PROT 5.8*  --  5.0*  --   --   --  5.9*     144  < > 144  144  --  146* 143 143  143   K 5.0  5.0  < > 3.6  3.6  < > 4.8 4.2 4.8  4.8   CO2 21*  21*  < > 24  24  --  24 20* 18*  18*     103  < > 104  104  --  105 105 105  105   BUN 8  8  < > 8  8  --  6 12 10  10   CREATININE 0.8  0.8  < > 0.8  0.8  --  0.7 0.9 0.9  0.9   ALKPHOS 187*  --  155*  --   --   --  165*   ALT 33  --  42  --   --   --  51*   *  --  123*  --   --   --  124*   BILITOT 2.1*  --  2.1*  --   --   --  2.6*   < > = values in this interval not displayed.   Coagulation:     Recent Labs  Lab 03/18/18  0929 03/19/18  1413 03/19/18  2300   INR 2.1* 1.8* 1.4*     LDH:    Recent Labs  Lab 03/18/18  2222   *     Microbiology:  Microbiology Results (last 7 days)     Procedure Component Value Units Date/Time    Blood culture [[id number]] Collected:  03/18/18 0618    Order Status:  Completed Specimen:  Blood from Peripheral, Hand, Left Updated:  03/20/18 0804     Blood Culture, Routine Gram stain aer bottle: Gram positive cocci in clusters resembling Staph      Blood Culture, Routine Results called to and read back by:Omid Cody RN 03/19/2018  10:55    Blood culture [[id number]] Collected:  03/18/18 0748    Order Status:  Completed Specimen:  Blood from Peripheral, Hand, Right Updated:  03/19/18 1012     Blood Culture, Routine No Growth to date     Blood Culture, Routine No Growth to date    Blood culture [[id number]] Collected:  03/13/18 1637    Order Status:  Completed Specimen:  Blood from Peripheral, Antecubital, Left Updated:  03/18/18 2012     Blood Culture, Routine No growth after 5 days.    Blood culture [[id number]] Collected:  03/13/18 1701    Order Status:  Completed Specimen:  Blood from Peripheral, Forearm, Left Updated:  03/18/18 2012     Blood Culture, Routine No growth after 5 days.    Blood culture [[id number]]     Order Status:  Canceled Specimen:  Blood     Blood culture [[id number]]      Order Status:  Canceled Specimen:  Blood     Culture, Respiratory with Gram Stain [[id number]] Collected:  03/15/18 1541    Order Status:  Completed Specimen:  Respiratory from Sputum Updated:  03/17/18 0914     Respiratory Culture No growth     Gram Stain (Respiratory) <10 epithelial cells per low power field.     Gram Stain (Respiratory) Rare WBC's      Gram Stain (Respiratory) No organisms seen    Culture, Respiratory with Gram Stain [[id number]] Collected:  03/14/18 1137    Order Status:  Completed Specimen:  Respiratory from BAL, TRIPP Updated:  03/16/18 1046     Respiratory Culture No growth     Gram Stain (Respiratory) No WBC's     Gram Stain (Respiratory) No organisms seen    AFB Culture & Smear [[id number]] Collected:  03/14/18 1137    Order Status:  Completed Specimen:  Bronchial Wash from Amniotic Fluid Updated:  03/15/18 2127     AFB Culture & Smear Culture in progress     AFB CULTURE STAIN No acid fast bacilli seen.    Respiratory Viral Panel by PCR [facility]; Nasal Swab [[id number]]  (Abnormal) Collected:  03/13/18 1733    Order Status:  Completed Specimen:  Respiratory Updated:  03/15/18 1302     Respiratory Virus Panel, source Nasal Swab     RVP - Adenovirus Not Detected     Comment: Detects Serotypes B and E. Detection of Serotype C may   be limited. If Adenovirus infection is suspected and a   Not Detected result is returned the sample should be   re-tested for Adenovirus using an independent method  (e.g. [employer] Adenovirus Quantitative Real-Time  PCR test.          Enterovirus Not Detected     Comment: Cross-reactivity has been observed between certain Rhinovirus  strains and the Enterovirus assay.          Human Bocavirus Not Detected     Human Coronavirus Not Detected     Comment: The Human Coronavirus assay detects Human coronavirus types  229E, OC43,NL63 and HKU1.          RVP - Human Metapneumovirus (hMPV) Not Detected     RVP - Influenza A Not Detected     Influenza A - X4R9-18 Not  Detected     RVP - Influenza B Not Detected     Parainfluenza Not Detected     Respiratory Syncytial VirusVirus (RSV) A Positive (A)     Comment: The Respiratory Syncytial Viral assay detects types A and B,  however it does not distinguish between the two.          RVP - Rhinovirus Not Detected     Comment: Cross-Reactivity has been observed between certain   Rhinovirus strains and the Enterovirus assay.  Target Enriched Mulitplex Polymerase Chain Reaction (TEM-PCR)  allows for the detection of multiple pathogens out of a single  reaction.  This test was developed and its performance   characteristics determined by [employer].  It has not   been cleared or approved by the U.S.Food and Drug Administration.  Results should be used in conjunction with clinical findings,   and should not form the sole basis for a diagnosis or treatment  decision.  TEM-PCR is a licensed technology of [employer].         Narrative:       Receiving Lab:->[facility]    Culture, Respiratory with Gram Stain [[id number]] Collected:  03/13/18 1441    Order Status:  Completed Specimen:  Respiratory from Sputum, Expectorated Updated:  03/15/18 1237     Respiratory Culture Normal respiratory hank     Gram Stain (Respiratory) <10 epithelial cells per low power field.     Gram Stain (Respiratory) Rare WBC's     Gram Stain (Respiratory) Moderate Gram positive cocci     Gram Stain (Respiratory) Many Gram negative rods    Fungus culture [[id number]] Collected:  03/14/18 1137    Order Status:  Completed Specimen:  Bronchial Wash from Amniotic Fluid Updated:  03/15/18 1124     Fungus (Mycology) Culture Culture in progress    Stool culture **CANNOT BE ORDERED STAT** [[id number]] Collected:  03/11/18 1923    Order Status:  Completed Specimen:  Stool from Stool Updated:  03/14/18 1549     Stool Culture No enteric hank     Stool Culture No Salmonella,Shigella,Vibrio,Campylobacter,Yersinia isolated.    Culture, Fluid  (Aerobic) [[id number]]  Collected:  03/14/18 1137    Order Status:  Canceled Specimen:  Bronchial Wash from Amniotic Fluid Updated:  03/14/18 1416    Cryptococcal antigen [[id number]] Collected:  03/14/18 0425    Order Status:  Completed Specimen:  Blood from Blood Updated:  03/14/18 1217     Cryptococcal Ag, Blood Negative    Culture, Respiratory with Gram Stain [[id number]] Collected:  03/13/18 1701    Order Status:  Completed Specimen:  Respiratory from Sputum, Expectorated Updated:  03/13/18 2224     Respiratory Culture Specimen inadequate - culture not performed     Gram Stain (Respiratory) >10epis/lfp and <than many WBC's     Gram Stain (Respiratory) Predominance of oropharyngeal hank. Please recollect.          I have reviewed all pertinent labs within the past 24 hours.    Estimated Creatinine Clearance: 97.9 mL/min (based on SCr of 0.9 mg/dL).    Diagnostic Results:  I have reviewed all pertinent imaging results/findings within the past 24 hours.    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 Most recently has had ongoing issues with his lungs, had gotten 2 thoracenteses, after which he underwent VATS 01/19/18 followed by pleurex catheter placement and effusion drainage 01/11/18, subsequently had it removed 02/07/18 after drainage had decreased despite multiple attempts to drain it. Has been having significant coughing fits that result in him being near-syncopal at times, although difficult to say if he has passed out or not- patient most recently was admitted to the hospital for increased AGUIALR, coughing and pre-syncope 02/11-02/14/18 at [facility].   Patient was started on antibiotics for suspected bacterial infection, with some diarrhea, bronchitis, and possible pneumonia. Ct chest showed some pleural fluid collection and after talking with  Dr. James, we arranged for him to receive a diagnostic tap with IR, from which the fluid collection demonstrated no growth or significant findings at all really. Cell counts do not appear to have been sent but will recheck. Patient states since his hospital stay, yesterday had a significant coughing fit again, after which he nearly passed out, is being seen in clinic today for this. Denies any cardiopulmonary complaints, no leg swelling, has some abdominal swelling, which is moderate for him. Denies significant shortness of breath with mild exertion, had 6MWT yesterday to see if he qualified for home O2, and his O2 sats actually improved after recovery from where he started initially. He and his wife admit he is in bed most days, not very active.     Mr. Crocker presented to the ED 3/11/18 with approximately 3 weeks of worsening diarrhea and 2-3 days of sinus pressure, drainage, and worsened cough.  He notes that he had a coughing fit last night and passed out, coughed throughout most of the night.  This also happened on 2/25/18.  He last saw Dr Ferreira in clinic on 2/20/18 where he was taken off myfortic and switched to cellcept (he hadn't been on cellcept because of leukopenia when they tried post-transplant).  Though his diarrhea had improved after his last discharge, he states it has increased now to 15x/day, clear/yellow/green, no fevers, no exacerbating foods per say.  He was taken back off the cellcept and placed back on myfortic this past week and has not noticed immediate change in diarrhea. He also reports his baseline coughing fits havent improved and are minimally responsive to tessalon pearls.  Came on last night, he lost consciousness during a fit once which is relatively normal for him, and had two more during HPI.  He notes no sick contacts but does state he's had some URI symptoms as above.  His baseline vitals are usually -120 and BP 90s/60s-110s/70s.  He reports a history of intermittent  diarrhea - did have Cdiff many years ago but this smells different.  No abdominal pain, no nausea, no vomiting.  No blood in diarrhea.  Appears last c-scope in 2015 with no evidence of infection or inflammatory processes.  He does report IBS which is different that this.       * Acute respiratory failure with hypoxia    -S/P Bronch and intubation 3/14. Cultures ngtd.   -RCV positive. Continue Ribavarin/IVIG/stress dose steroids per lung transplant protocol for severe RSV(given myelosuppression, will aim for 5 day course to minimize toxicity).   -Aspergill Ag neg from BAL.   -Urine histo/blasto antigens neg, crypto antigen neg, urine legionella neg, and Quantiferon pending.  -Appreciate ID help-->continue empiric vancomycin given GPCC(suspect contaminant but will await speciation). Continue zosyn for now while awaiting final BAL cx results. Stop azithromycin(he has completed a >5 day course for possible atypical pneumonia without positive Legionella studies)  -Continue empiric posaconazole for now(transitioned to per tube to avoid accumulation to cyclodextrin carrier)  -Tansitioned IV GCV to maintenance dosing as no obvious evidence of CMV disease (no diarrhea since Friday, negative serum/BAL CMV PCR)  - await pending tests including: final BAL cultures and Quantiferon   -Pulmonology managing vent        Septic shock    -Continue Levo for MAP 60. Continue Vaso 0.04u/hr.  -Continue stress dose hydrocortisone 100mg TID.  -Appreciate ID assistance. Abx adjustments as above.        Acute renal failure superimposed on stage 4 chronic kidney disease    - Appreciate Neph Cx. Continue CRRT as tolerated.         Heart transplanted    - TTE 3/12/18 showed LVEF 50-55% (but no substantial change when compared to previous echo), RV normal and IVC 8  - Had -ve DSE on 11/30/17  - Current immunosuppression: Pred/Prograf/MMF on hold. Continue hydrocortisone 100mg Q8H.         Pancytopenia    - Appreciate Hem Cx.   -The patient  developed thrombocytopenia 5 days after receiving enoxaparin and has a 4T score of 5 indicating a ~ 14% chance of HIT.  The patients HIT panel was positive with OD of 0.431 indicating a 1-5% chance of having HIT.  Would wait for HILDA before considering anticoagulation with argatroban.   -The patients thrombocytopenia could be drug induced as well.   -The patient has a low fibrinogen, elevated INR, shistocytes on peripheral smear and elevated LDH which all point towards DIC. The haptoglobin is elevated which goes against hemolytic anemia,; however, haptoglobin is also an acute phase reactant.  As DIC is the higher on the differential would treat as such by treating underlying cause which is likely sepsis.       -Check fibrinogen, INR and plt count every 8 hours.  -Would give cryoprecipitate to get fibrinogen over 100.  Would give one unit at a time.  -Would give FFP to get the INR below 1.8.  Would give at least two units at a time.  May consider giving the patient Vitamin K given recent use of coumadin.          Restrictive cardiomyopathy    -S/P thoracentesis X 2, followed by VATS/pleurex cath placement (January 2018) with removal of pleurex (February 2018) and 3rd thoracentesis 2/14/18 with no significant findings on fluid removal. Moderate right pleural effusion stable by CXR 3/11/18 and chest CT 3/13/18  -Last paracentesis was in January. Abd US 3/12/18 confirmed ongoing ascites (not tense at all). Will consider getting paracentesis this admit  -Continue Levophed for MAP 60. Vaso 0.04u/h.        Ileus    -Had BM yesterday,  -Will try tube feeds again today.  - Continue bowel program for now        Type 1 diabetes mellitus with stage 3 chronic kidney disease    - Appreciate Endocrine's recs        Hypothyroidism due to Hashimoto's thyroiditis    - TSH low at 0.101 with normal FT4. Appreciate Endocrine's recs        Critical Care Time: 50 minutes     Critical care was time spent personally by me on the following  activities: development of treatment plan with patient or surrogate and bedside caregivers, discussions with consultants, evaluation of patient's response to treatment, examination of patient, ordering and performing treatments and interventions, ordering and review of laboratory studies, ordering and review of radiographic studies, pulse oximetry, re-evaluation of patient's condition. This critical care time did not overlap with that of any other provider or involve time for any procedures.        Tim Mao NP  Heart Transplant  [facility]    I have seen the patient, reviewed the nurse practitioner's assessment and plan. I have personally interviewed and examined the patient at bedside and: agree with the findings.   CXR clearly improved from last week  Appreciate help of all consultants    Behzad Lara MD, FACC, DERICK  Medical Director, Cardiomyopathy and Heart Failure Program    Updated wife

## 2018-03-20 NOTE — PHYSICIAN QUERY
PT Name: Lv Crocker  MR #: [id number]    Physician Query Form - Respiratory Condition Clarification      CDS/: Emilie Hussein RN, CCDS          Contact information: [email or web address]    This form is a permanent document in the medical record.    Query Date: March 20, 2018    By submitting this query, we are merely seeking further clarification of documentation. Please utilize your independent clinical judgment when addressing the question(s) below.    The Medical record contains the following   Indicators   Supporting Clinical Findings Location in Medical Record   X   SOB, AGUILAR, Wheezing, Productive Cough, Use of Accessory Muscles, etc. endoscopies aborted 2/2 SOB/hypoxia. 3/13 prog note      Acute/Chronic Illness        Radiology Findings     X   Respiratory Distress or Failure Acute respiratory Failure with hypoxia    hypoxemic and hypercapnic resp failure 3/14- 3/20 prog notes      3/16- 3/20 pulm notes      Hypoxia or Hypercapnia     X   RR         ABGs         O2 sat repeat ABG after being on Comfort flow NC oxygen worse-pH-7.176, pCO2 47.9, p)2 72,  3/13 prog note   X   BiPAP/Intubation Called to room for emergent intubation., intubated per MD Curtis with 8.0 ETT  3/14 nurse note   X   Supplemental O2 , o2 sats in the upper 80's, pt placed on face mask at 15 liter 3/13 nurse note      Home O2, Oxygen Dependence        Treatment        Other       Provider, please specify diagnosis or diagnoses associated with above clinical findings.    [ X ] Acute Respiratory Failure with Hypoxia and Hypercapnia   Acute Respiratory Failure with Hypoxia  [  ] Other Respiratory Diagnosis (please specify): _______________________________  [  ] Clinically Undetermined    Please document in your progress notes daily for the duration of treatment until resolved and include in your discharge summary.

## 2018-03-20 NOTE — ASSESSMENT & PLAN NOTE
- baseline sCr 2.6-3.0 consistent with CKD stage IV  - anuric CACHORRO; likely ischemic ATN from prolonged pre-renal state (diarrhea) in setting of prograf renal vasoconstriction; cannot r/o septic ATN or Prograf toxicity  - SLED started 3/14  - remains anuric  - net negative 2.5L yesterday (negative 4.5L/48h). Minimal vent requirements. Hourly intake ~40cc/hr  - worsening AGMA. Ordered lactate and beta-OH (starvation ketosis?).   - continue SLED today for clearance. Higher bicarb dialysate bath today given drop in pH and HCO3. Will decrease UF goal to match I/O: 300-350cc/hr

## 2018-03-20 NOTE — SUBJECTIVE & OBJECTIVE
Interval History: Remains intubated with variable pressor requirement (likely due to volume sensitive state on CRRT). Afebrile. Cultures unremarkable to date. Myelosuppression seems improved/plateaued after some drug adjustments. Ongoing possible DIC noted.    Review of Systems   Unable to perform ROS: Intubated     Objective:     Vital Signs (Most Recent):  Temp: 98.4 °F (36.9 °C) (03/20/18 1500)  Pulse: (!) 112 (03/20/18 1555)  Resp: (!) 28 (03/20/18 1555)  BP: (!) 95/53 (03/20/18 1300)  SpO2: 100 % (03/20/18 1555) Vital Signs (24h Range):  Temp:  [96.8 °F (36 °C)-98.4 °F (36.9 °C)] 98.4 °F (36.9 °C)  Pulse:  [] 112  Resp:  [21-28] 28  SpO2:  [98 %-100 %] 100 %  BP: ()/(49-54) 95/53  Arterial Line BP: ()/(33-71) 105/47     Weight: 76.3 kg (168 lb 3.4 oz)  Body mass index is 26.35 kg/m².    Estimated Creatinine Clearance: 97.9 mL/min (based on SCr of 0.9 mg/dL).    Physical Exam   Constitutional: He appears well-developed and well-nourished. No distress.   Chronically ill appearing. Intubated.   HENT:   Head: Atraumatic.   Mouth/Throat: Oropharynx is clear and moist. No oropharyngeal exudate.   Eyes: Conjunctivae are normal. Pupils are equal, round, and reactive to light. No scleral icterus.   Neck: Neck supple.   Cardiovascular: Normal rate and regular rhythm.  Exam reveals no friction rub.    No murmur heard.  Pulmonary/Chest: No respiratory distress. He has no wheezes. He has rales. He exhibits no tenderness.   Intubated.   Abdominal: Soft. Bowel sounds are normal. He exhibits no distension. There is no tenderness. There is no rebound and no guarding.   Musculoskeletal: Normal range of motion. He exhibits no edema.   Lymphadenopathy:     He has no cervical adenopathy.   Neurological:   Intubated/sedated.   Skin: No rash noted. No erythema.   Mottling of some digits.       Significant Labs:   CBC:     Recent Labs  Lab 03/19/18  2300 03/20/18  0821 03/20/18  1610   WBC 3.47* 4.30 7.20   HGB 9.0*  9.2* 8.6*   HCT 28.3* 28.6* 25.9*   PLT 68* 59* 43*     CMP:   Recent Labs  Lab 03/19/18  0247  03/19/18  2300 03/20/18  0438 03/20/18  1332     144  < > 143 143  143 143   K 3.6  3.6  < > 4.2 4.8  4.8 5.5*     104  < > 105 105  105 102   CO2 24  24  < > 20* 18*  18* 17*   *  125*  < > 174* 153*  153* 176*   BUN 8  8  < > 12 10  10 12   CREATININE 0.8  0.8  < > 0.9 0.9  0.9 0.9   CALCIUM 7.8*  7.8*  < > 8.3* 8.3*  8.3* 8.5*   PROT 5.0*  --   --  5.9*  --    ALBUMIN 2.1*  2.1*  < > 2.6* 2.6*  2.6* 2.8*   BILITOT 2.1*  --   --  2.6*  --    ALKPHOS 155*  --   --  165*  --    *  --   --  124*  --    ALT 42  --   --  51*  --    ANIONGAP 16  16  < > 18* 20*  20* 24*   EGFRNONAA >60.0  >60.0  < > >60.0 >60.0  >60.0 >60.0   < > = values in this interval not displayed.    Significant Imaging: I have reviewed all pertinent imaging results/findings within the past 24 hours.     CT chest:  Status post heart transplantation with new multifocal subsegmental consolidation throughout both lungs.  Similar findings were present on the CT dated 12/12/2017 with partial improvement on 02/14/2018 therefore we doubt malignancy.  Differential considerations include multifocal infectious pneumonia, hemorrhage, or aspiration.  We consider pulmonary edema due to abnormal capillary permeability or cardiac decompensation less likely.  Lymphoma could present similarly although felt less likely due to improvement on CT of 2/14/2018.    Continued moderate sized incompletely dependent right pleural effusion with rounded consolidation in the right lower lobe which probably represents rounded atelectasis, similar to prior, new from 7/19/2016.    Redemonstration of mild scattered ascites in the upper abdomen.     Microbiology:  3/11 stool WBC negative  3/11 stool cx: negative  3/11 O&P negative  3/11 C.diff negative  3/11 Giardia antigen negative  3/11 Cryptosporidium antigen negative  3/11 rotavirus  negative  3/11 norovirus negative  3/12 CMV quant negative  3/13 blood cx: NGTD  3/13 sputum cx: NGTD  3/13 aspergillus antigen negative  3/13 fungitell negative  3/13 urine histo negative  3/13 urine blasto negative  3/13 crypto antigen negative  3/13 urine legionella negative  3/13 RVP + for RSV A  3/13 Strongyloides ab negative  3/13 [place] stool pathogens panel negative  3/14 quantiferon pending  3/14 BAL cx: NGTD, BAL asp ag negative, BAL CMV PCR negative; Legionella cx NGTD

## 2018-03-20 NOTE — ASSESSMENT & PLAN NOTE
-S/P Bronch and intubation 3/14. Cultures ngtd.   -RCV positive. Continue Ribavarin/IVIG/stress dose steroids per lung transplant protocol for severe RSV(given myelosuppression, will aim for 5 day course to minimize toxicity).   -Aspergill Ag neg from BAL.   -Urine histo/blasto antigens neg, crypto antigen neg, urine legionella neg, and Quantiferon pending.  -Appreciate ID help-->continue empiric vancomycin given GPCC(suspect contaminant but will await speciation). Continue zosyn for now while awaiting final BAL cx results. 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

## 2018-03-20 NOTE — ASSESSMENT & PLAN NOTE
43yo man w/a history of DM1, Hashimoto's thyroiditis, and ICM (s/p OHT 11/18/2014, CMV D+/R+, steroid induction, on maintenance tacro/MMF/pred; c/b early hemorrhagic tamponade requiring washout, delayed closure, and pericardial window and subsequent AF w/RVR, and CACHORRO; late course c/b ABMR 4/2015 s/p rituxan, PLEX, and IVIG; subsequent C.diff/CMV reactivation, restrictive cardiomyopathy with recurrent pleural effusions s/p VATS/pleurex catheter 1/2018 with removal 2/2018 and ascites requiring repeated LVP, and CKD) who was admitted on 3/11/2018 with ~3wks of worsening profuse non-bloody diarrhea, associated cramping abdominal pain, N/V, and a week of acute on chronic cough, SOB, hypoxia requiring intubation, and fevers and was found to have multifocal pneumonia on CT chest due to RSV-A pneumonia with suspected superimposed bacterial pneumonia. He remains critically ill with a course c/b ongoing pressor requirement, hypoxic RF, CRRT requirement, and possible evolving DIC (of note, suspect CONS in blood cx is contaminant but will prove). Will aim to continue to taper antimicrobials today to ensure polypharmacy is not driving his hematologic abnormalities.    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

## 2018-03-20 NOTE — PLAN OF CARE
Problem: Patient Care Overview  Goal: Plan of Care Review  Outcome: Ongoing (interventions implemented as appropriate)  Neuro: Sedated and intubated (PRECEDEX/FENTANYL); appears comfortable       Pulmonary:Breath sounds clear & diminished; ETT 24 @ lips, A/C, R21, PEEP 5, O2 40%, . Pt sat >99% throughout shift. No breathing over vent     Cardiovascular: LEVO titrated throughout shift to maintain MAP >60; currently infusing @ 0.07mcg/kg/min (increased requirements from previous shift). VASO (NON-TITRATING) infusing as well. SVO2 = 59 this morning. CBC being monitored q8 hours - FFP transfused at beginning of shift, no other blood products required with 0000 blood sample. Temp monitored via esophageal temp probe; warming blanket required intermittently. Patient NSR to ST. CVP 8-10. Right Fem Ignacio intact, pulsatile blood flow.         Gastrointestinal: OG tube at 60 cm, TF @ 10 x4 hours, paused after residual >350cc. Will re-check with AM assessment. Patient had large, loose BM overnight, bowel sounds hypoactive        Genitourinary: Anuric; Patient on continuous CRRT on SLED of  (goal). Patient clotted off overnight x1      Endocrine: Insulin gtt; CBG q 2 hours. Currently infusing @ 0.1 - orders per NEPH to not decrease gtt below .1      Integumentary/Other: Skin intact; preventative mepilex applied to sacrum during CHG bath      Infusions: Fentanyl, Precedex, Vaso, Levo, Insulin, KVO (abx)    POC reviewed with patient & wife @ bedside.

## 2018-03-20 NOTE — SUBJECTIVE & OBJECTIVE
"Interval HPI:   Overnight events:  Remains intubated and sedated, on pressors.   Getting CRRT.   Remains on HC 100mg q8.   BG at goal on minimal insulin requirements on intensive gtt.   Getting LT4 125mcg per NGT  TF held due to residual     BP (!) 106/53 (BP Location: Right arm, Patient Position: Lying)   Pulse 77   Temp 97.7 °F (36.5 °C) (Core Esophageal)   Resp (!) 23   Ht 5' 7" (1.702 m)   Wt 76.3 kg (168 lb 3.4 oz)   SpO2 100%   BMI 26.35 kg/m²     Labs Reviewed and Include      Recent Labs  Lab 03/20/18  0438   *  153*   CALCIUM 8.3*  8.3*   ALBUMIN 2.6*  2.6*   PROT 5.9*     143   K 4.8  4.8   CO2 18*  18*     105   BUN 10  10   CREATININE 0.9  0.9   ALKPHOS 165*   ALT 51*   *   BILITOT 2.6*     Lab Results   Component Value Date    WBC 3.47 (L) 03/19/2018    HGB 9.0 (L) 03/19/2018    HCT 28.3 (L) 03/19/2018    MCV 90 03/19/2018    PLT 68 (L) 03/19/2018     No results for input(s): TSH, FREET4 in the last 168 hours.  Lab Results   Component Value Date    HGBA1C 6.9 (H) 02/11/2018       Nutritional status:   Body mass index is 26.35 kg/m².  Lab Results   Component Value Date    ALBUMIN 2.6 (L) 03/20/2018    ALBUMIN 2.6 (L) 03/20/2018    ALBUMIN 2.6 (L) 03/19/2018     Lab Results   Component Value Date    PREALBUMIN 5 (L) 03/15/2018    PREALBUMIN 18 (L) 03/24/2015    PREALBUMIN 19 (L) 12/17/2014       Estimated Creatinine Clearance: 97.9 mL/min (based on SCr of 0.9 mg/dL).    Accu-Checks  Recent Labs      03/19/18   1218  03/19/18   1411  03/19/18   1626  03/19/18   1803  03/19/18   2017  03/19/18   2343  03/20/18   0142  03/20/18   0440  03/20/18   0603  03/20/18   0818   POCTGLUCOSE  153*  151*  182*  179*  170*  167*  141*  155*  147*  153*       Current Medications and/or Treatments Impacting Glycemic Control  Immunotherapy:  Immunosuppressants     None        Steroids:   Hormones     Start     Stop Route Frequency Ordered    03/17/18 1530  vasopressin (PITRESSIN) " 0.2 Units/mL in dextrose 5 % 100 mL infusion      -- IV Continuous 03/17/18 1424    03/15/18 1400  hydrocortisone sodium succinate injection 100 mg      -- IV Every 8 hours 03/15/18 1224        Pressors:    Autonomic Drugs     Start     Stop Route Frequency Ordered    03/14/18 1045  norepinephrine 16 mg in dextrose 5 % 250 mL infusion     Question Answer Comment   Titrate by: (in mcg/kg/min) 0.02    Titrate interval: (in minutes) 2    Titrate to maintain: (MAP or SBP) MAP    Greater than: (in mmHg) 60    Maximum dose: (in mcg/kg/min) 3        -- IV Continuous 03/14/18 0937        Hyperglycemia/Diabetes Medications: Antihyperglycemics     Start     Stop Route Frequency Ordered    03/14/18 1630  insulin regular (Humulin R) 100 Units in sodium chloride 0.9% 100 mL infusion     Question:  Insulin Rate Adjustment (DO NOT MODIFY ANSWER)  Answer:  [email or web address] [id number]    -- IV Continuous 03/14/18 1538

## 2018-03-20 NOTE — CONSULTS
Please see consult note from 3/19/18.    Clovis Mcbride MD PGY-V  Hematology and Oncology  Pager:[phone]

## 2018-03-20 NOTE — PROGRESS NOTES
"[facility]  Endocrinology  Progress Note    Admit Date: 3/11/2018     Reason for Consult: abnormal TFTs    Surgical Procedure and Date: s/p heart transplant 2014     Diabetes diagnosis year: 7yo (T1DM)     Home Diabetes Medications:    Levemir 15u qHS  Novolog 4u AC     How often checking glucose at home? 4 times daily   BG readings on regimen: 150-200s  Hypoglycemia on the regimen?  Yes, rarely - treats appropriately (light snack, glucose tab)  Missed doses on regimen?  No        Diabetes Complications include:     Hyperglycemia, Hypoglycemia  and Diabetic chronic kidney disease          Complicating diabetes co morbidities:   N/A     HPI:   Patient is a 42 y.o. male with a diagnosis of T1DM, HTN, hypothyroidism, s/p heart transplant.  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 He required serial paracentesis and thoracentesis until he underwent a VATS with pleurX catheter placement on 1/11/2018 and removed 2/7/18.       Presented to hospital secondary to diarrhea and cough.  Upon initial labs, TSH was decreased (0.101) and free T4 1.33.  Endocrinology consulted to assist in management of these labs.  He was last seen in clinic by Kamala Vázquez NP 11/2017.   Previous hospitalization, endocrine consulted for same problem.  During that visit, recommended decreasing LT4 to 125mcg daily. Unfortunately, patient was discharged on previous dose of 150mcg daily.     Interval HPI:   Overnight events:  Remains intubated and sedated, on pressors.   Getting CRRT.   Remains on HC 100mg q8.   BG at goal on minimal insulin requirements on intensive gtt.   Getting LT4 125mcg per NGT  TF held due to residual     BP (!) 106/53 (BP Location: Right arm, Patient Position: Lying)   Pulse 77   Temp 97.7 °F (36.5 °C) (Core Esophageal)   Resp (!) 23   Ht 5' 7" (1.702 m)   Wt 76.3 kg (168 lb 3.4 oz)   SpO2 " 100%   BMI 26.35 kg/m²       Labs Reviewed and Include      Recent Labs  Lab 03/20/18  0438   *  153*   CALCIUM 8.3*  8.3*   ALBUMIN 2.6*  2.6*   PROT 5.9*     143   K 4.8  4.8   CO2 18*  18*     105   BUN 10  10   CREATININE 0.9  0.9   ALKPHOS 165*   ALT 51*   *   BILITOT 2.6*     Lab Results   Component Value Date    WBC 3.47 (L) 03/19/2018    HGB 9.0 (L) 03/19/2018    HCT 28.3 (L) 03/19/2018    MCV 90 03/19/2018    PLT 68 (L) 03/19/2018     No results for input(s): TSH, FREET4 in the last 168 hours.  Lab Results   Component Value Date    HGBA1C 6.9 (H) 02/11/2018       Nutritional status:   Body mass index is 26.35 kg/m².  Lab Results   Component Value Date    ALBUMIN 2.6 (L) 03/20/2018    ALBUMIN 2.6 (L) 03/20/2018    ALBUMIN 2.6 (L) 03/19/2018     Lab Results   Component Value Date    PREALBUMIN 5 (L) 03/15/2018    PREALBUMIN 18 (L) 03/24/2015    PREALBUMIN 19 (L) 12/17/2014       Estimated Creatinine Clearance: 97.9 mL/min (based on SCr of 0.9 mg/dL).    Accu-Checks  Recent Labs      03/19/18   1218  03/19/18   1411  03/19/18   1626  03/19/18   1803  03/19/18   2017  03/19/18   2343  03/20/18   0142  03/20/18   0440  03/20/18   0603  03/20/18   0818   POCTGLUCOSE  153*  151*  182*  179*  170*  167*  141*  155*  147*  153*       Current Medications and/or Treatments Impacting Glycemic Control  Immunotherapy:  Immunosuppressants     None        Steroids:   Hormones     Start     Stop Route Frequency Ordered    03/17/18 1530  vasopressin (PITRESSIN) 0.2 Units/mL in dextrose 5 % 100 mL infusion      -- IV Continuous 03/17/18 1424    03/15/18 1400  hydrocortisone sodium succinate injection 100 mg      -- IV Every 8 hours 03/15/18 1224        Pressors:    Autonomic Drugs     Start     Stop Route Frequency Ordered    03/14/18 1045  norepinephrine 16 mg in dextrose 5 % 250 mL infusion     Question Answer Comment   Titrate by: (in mcg/kg/min) 0.02    Titrate interval: (in minutes) 2     Titrate to maintain: (MAP or SBP) MAP    Greater than: (in mmHg) 60    Maximum dose: (in mcg/kg/min) 3        -- IV Continuous 03/14/18 0937        Hyperglycemia/Diabetes Medications: Antihyperglycemics     Start     Stop Route Frequency Ordered    03/14/18 1630  insulin regular (Humulin R) 100 Units in sodium chloride 0.9% 100 mL infusion     Question:  Insulin Rate Adjustment (DO NOT MODIFY ANSWER)  Answer:  [email or web address] [id number]    -- IV Continuous 03/14/18 1530          ASSESSMENT and PLAN    * Acute respiratory failure with hypoxia    Per primary        Type 1 diabetes mellitus with hyperglycemia    BG goal 140-180  Continue intensive insulin gtt q2hr checks while remains intubated.   Once stable will switch to transition.   Do not suspend gtt >1 hr, pt is T1DM.    Low c peptide with glc 164. Treat as type 1.   Neg MODE 2013, neg islet cell ab on this admission.   Cannot order insulin ab while on insulin, and ZnT8 unavailable in labs.          On enteral nutrition    May adversely impact bg            Hypothyroidism due to Hashimoto's thyroiditis     Abnormal TFTs upon admit.  Unclear if secondary to illness, but with evidence of iatrogenic hyperthyroidism in past while on above weight based dose.      Continue LT4 ng 125mcg daily (decreased from 150mcg)  Repeat TFTs in 4 weeks (due ~4/15)             Acute renal failure superimposed on stage 4 chronic kidney disease     Avoid insulin stacking             Heart transplanted     Per transplant  Avoid hypoglycemia             Current use of steroid medication     Chronic steroids PDN 2.5mg o/p.   Agree with stress dose steroids during critical illness          Palmira Dorsey MD  Endocrinology  [facility]

## 2018-03-20 NOTE — PROGRESS NOTES
[facility]  Pulm/Critical Care - Medicine  Progress Note    Patient Name: Lv Crocker  MRN: [id number]  Admission Date: 3/11/2018  Hospital Length of Stay: 8 days  Code Status: Full Code  Attending Provider: Jose A Lloyd MD  Primary Care Provider: Philippe Mohr MD   Principal Problem: Acute respiratory failure with hypoxia    Subjective:   Was seen and examined. Sedation held and pt taking time to awaken. Culture data reviewed.  Review of Systems   Unable to obtain  Objective:     Vital Signs (Most Recent):  Temp: 96.8 °F (36 °C) (03/20/18 0541)  Pulse: 71 (03/20/18 0541)  Resp: (!) 23 (03/18/18 0345)  BP: 96/60 (03/19/18 1200)  SpO2: 100 % (03/20/18 0541) Vital Signs (24h Range):  Temp:  [96.8 °F (36 °C)-98.2 °F (36.8 °C)] 96.8 °F (36 °C)  Pulse:  [70-80] 71  SpO2:  [95 %-100 %] 100 %  BP: ()/(60-76) 96/60  Arterial Line BP: ()/(41-71) 113/57     Weight: 76.3 kg (168 lb 3.4 oz)  Body mass index is 26.35 kg/m².      Intake/Output Summary (Last 24 hours) at 03/20/18 0558  Last data filed at 03/20/18 0447   Gross per 24 hour   Intake           7040.2 ml   Output             9517 ml   Net          -2476.8 ml       Physical Exam   Constitutional: He is oriented to person, place, and time. He appears well-developed and well-nourished.   Sedated on mechanical ventilation   HENT:   Head: Normocephalic and atraumatic.   Neck: Normal range of motion. Neck supple. No tracheal deviation present. No thyromegaly present.   Cardiovascular:   tachycardic   Pulmonary/Chest:   On the vent    Abdominal: Soft.   Musculoskeletal: He exhibits no edema.   Neurological: He is alert and oriented to person, place, and time.   Skin: Skin is warm and dry.       Vents:  Vent Mode: A/C (03/20/18 0541)  Ventilator Initiated: Yes (03/14/18 0932)  Set Rate: 21 bmp (03/20/18 0541)  Vt Set: 400 mL (03/20/18 0541)  Pressure Support: 0 cmH20 (03/20/18 0541)  PEEP/CPAP: 5 cmH20 (03/20/18 0541)  Oxygen  Concentration (%): 40 (03/20/18 0541)  Peak Airway Pressure: 33 cmH2O (03/20/18 0541)  Plateau Pressure: 28 cmH20 (03/20/18 0541)  Total Ve: 8.75 mL (03/20/18 0541)  F/VT Ratio<105 (RSBI): (!) 0 (03/17/18 0740)    Lines/Drains/Airways     Central Venous Catheter Line                 Trialysis (Dialysis) Catheter 03/14/18 0813 right internal jugular 5 days          Drain                 NG/OG Tube 03/14/18 1300 [facility] mouth 5 days          Airway                 Airway - Non-Surgical 03/14/18 0930 Endotracheal Tube 5 days          Arterial Line                 Arterial Line 03/14/18 1600 Right Femoral 5 days          Peripheral Intravenous Line                 Peripheral IV - Single Lumen 03/13/18 1600 Left Antecubital 6 days         Peripheral IV - Single Lumen 03/14/18 1736 Right Forearm 5 days         Midline Catheter Insertion/Assessment  - Single Lumen 03/17/18 1454 Left cephalic vein (lateral side of arm) 18g x 8cm 2 days         Peripheral IV - Single Lumen 03/19/18 1813 Left Forearm less than 1 day         Peripheral IV - Single Lumen 03/19/18 1827 Right Forearm less than 1 day                Significant Labs:    CBC/Anemia Profile:    Recent Labs  Lab 03/19/18  0247 03/19/18  1413 03/19/18  2300   WBC 2.26* 3.69* 3.47*   HGB 6.3* 9.7* 9.0*   HCT 19.7* 29.5* 28.3*   PLT 55* 69* 68*   MCV 88 88 90   RDW 17.2* 16.2* 16.6*        Chemistries:    Recent Labs  Lab 03/19/18  0247  03/19/18  1413 03/19/18  1829 03/19/18  2300 03/20/18  0438     144  --  146*  --  143 143  143   K 3.6  3.6  < > 4.8  --  4.2 4.8  4.8     104  --  105  --  105 105  105   CO2 24  24  --  24  --  20* 18*  18*   BUN 8  8  --  6  --  12 10  10   CREATININE 0.8  0.8  --  0.7  --  0.9 0.9  0.9   CALCIUM 7.8*  7.8*  --  8.0*  --  8.3* 8.3*  8.3*   ALBUMIN 2.1*  2.1*  --  2.3*  --  2.6* 2.6*  2.6*   PROT 5.0*  --   --   --   --  5.9*   BILITOT 2.1*  --   --   --   --  2.6*   ALKPHOS 155*  --   --   --   --   165*   ALT 42  --   --   --   --  51*   *  --   --   --   --  124*   MG 1.8  < >  --  3.5* 1.9 1.7  1.7   PHOS 1.7*  --  1.4*  --  2.6* 2.7   < > = values in this interval not displayed.        Assessment/Plan:   1. ARDS  2. CACHORRO  3. DIC      42 yeear-old male with a PMH of a heart transplant who was admitted to the hospital on 3/11 for diarrhea and cough. He was intubated on 5/14 for hypoxemic and hypercapnic resp failure. His CXR has improved c/w last week. New issues are his development of DIC.Transfused cryo, FFP and PRBCs yest. Today, sedation interrupted and pt taking a long time to awaken. Likely effect of high dose benzos from last week. Plan:    -Withhold all sedation and allow pt to wake up. SBT to follow once pt is more awake.  -LTV ventilation at 6 cc/Kg of IBW.  -CRRT  -Abs managed by ID   -DVT prophylaxis    We will continue to follow with you. Please keep sedation to minimum and aim for RASS of 0 to +1.    Geri Stringer MD  Critical Care - Medicine  [facility]

## 2018-03-20 NOTE — PROGRESS NOTES
Plan of care discussed with patient and wife at bedside. SBA attempted today-- fentanyl turned off, Precedex infusing at 0.02mcg/kg/hr-- patient squinting closed eyes to repeated stimuli, however still sedated; soft restraints applied. Levophed titrated to meet MAP goal. CRRT UF decreased to 300. Patient right 3rd toe & right thumb, index, & middle finger cool and blue at tips- +2 pulses palpable; heat packs & bear hugger applied; MD Ángel to bedside (see previous notes). Patient residual ~275-- trickle feedings held this evening 1800. Patient received 1 unit Cryo, awaiting second unit to arrive for infusion. Will continue to monitor.

## 2018-03-20 NOTE — NURSING
Rounds Report: Attended interdisciplinary rounds this morning with the transplant team including SW, physicians, fellows,  mid-level providers, and transplant coordinators.  Discussed plan of care, pulmonary weaning vent as tolerating. De-escalating antibiotics. Day by day. No DC date.

## 2018-03-20 NOTE — PROGRESS NOTES
cont crrt re-started w/o diff via right a/v cath with lines taped and secured.  Machine set for nephrology orders.   Safety lure lock to venous chamber per protocol.  Air detector engaged.  Supplies stocked report given.

## 2018-03-20 NOTE — PROGRESS NOTES
03/20/18 1530   Peripheral Neurovascular   Peripheral Neurovascular WDL ex   RUE greater than 3 secs   RLE greater than 3 secs   VTE Required Core Measure Sequential compression device initiated/maintained   VTE Prevention/Management bleeding precautions maintained;bleeding risk assessed;bleeding risk factor(s) identified, provider notified;remove, assess skin and reapply sequential compression device   Neurovascular Assessment   All Extremities RLE;RUE   All Extremities Temperature cool   All Extremities Color pale   RUE Temperature cool   RUE Color dusky;cyanotic   RLE Temperature cool   RLE Color cyanotic;dusky  (third toe)   Radial Pulse   Left Radial Pulse 1+ (weak)   Right Radial Pulse 1+ (weak)   Dorsalis Pedis Pulse   Left Dorsalis Pedis Pulse 2+ (normal)   Right Dorsalis Pedis Pulse 2+ (normal)   Posterior Tibial Pulse   Left Posterior Tibial Pulse 2+ (normal)   Right Posterior Tibial Pulse 2+ (normal)   Levophed tirated to 0.22 mcg/kg/min to meet MAP goal. H/H 8.6/25.9 (9.2/28 @0800 03/20/2018). MD Ángel updated & at bedside

## 2018-03-20 NOTE — PROGRESS NOTES
[facility]  Infectious Disease  Progress Note    Patient Name: Lv Crocker  MRN: [id number]  Admission Date: 3/11/2018  Length of Stay: 8 days  Attending Physician: Jose A Lloyd MD  Primary Care Provider: Philippe Mohr MD    Isolation Status: Contact  Assessment/Plan:      * Acute respiratory failure with hypoxia    43yo man w/a history of DM1, Hashimoto's thyroiditis, and ICM (s/p OHT 11/18/2014, CMV D+/R+, steroid induction, on maintenance tacro/MMF/pred; c/b early hemorrhagic tamponade requiring washout, delayed closure, and pericardial window and subsequent AF w/RVR, and CACHORRO; late course c/b ABMR 4/2015 s/p rituxan, PLEX, and IVIG; subsequent C.diff/CMV reactivation, restrictive cardiomyopathy with recurrent pleural effusions s/p VATS/pleurex catheter 1/2018 with removal 2/2018 and ascites requiring repeated LVP, and CKD) who was admitted on 3/11/2018 with ~3wks of worsening profuse non-bloody diarrhea, associated cramping abdominal pain, N/V, and a week of acute on chronic cough, SOB, hypoxia requiring intubation, and fevers and was found to have multifocal pneumonia on CT chest due to RSV-A pneumonia with suspected superimposed bacterial pneumonia. He remains critically ill with a course c/b ongoing pressor requirement, hypoxic RF, CRRT requirement, and possible evolving DIC (of note, suspect CONS in blood cx is contaminant but will prove). Will aim to continue to taper antimicrobials today to ensure polypharmacy is not driving his hematologic abnormalities.    - will continue empiric vancomycin for now given CONS that resulted from blood cx - would repeat cultures and if negative, can stop vanc (given that growth occurred already on this agent); if grows again, would exchange lines and continue therapy  - would continue zosyn for now while awaiting final BAL cx results -- negative so far  - may continue empiric posaconazole per tube to avoid accumulation to cyclodextrin  carrier -- fungal markers negative, cx pending  - can stop IV GCV prophylaxis as patient is several years out from transplant without evidence of active CMV disease; would continue to check weekly quants during this episode of acute illness as he is at risk of reactivation  - can stop ribavirin/IVIG as has completed 5 day course for RSV pneumonia; contact precautions  - await pending tests including: final BAL cultures and Quantiferon             Anticipated Disposition: pending improvement    Thank you for your consult. I will follow-up with patient. Please contact us if you have any additional questions.     Maribell Eric MD  Transplant ID Attending  [id number]    Maribell Eric MD  Infectious Disease  [facility]    Subjective:     Principal Problem:Acute respiratory failure with hypoxia    HPI: 43yo man w/a history of DM1, Hashimoto's thyroiditis, and ICM (s/p OHT 11/18/2014, CMV D+/R+, steroid induction, on maintenance tacro/MMF/pred; c/b early hemorrhagic tamponade requiring washout, delayed closure, and pericardial window and subsequent AF w/RVR, and CACHORRO; late course c/b ABMR 4/2015 s/p rituxan, PLEX, and IVIG; subsequent C.diff/CMV reactivation, restrictive cardiomyopathy with recurrent pleural effusions s/p VATS/pleurex catheter 1/2018 with removal 2/2018 and ascites requiring repeated LVP, and CKD) who was admitted on 3/11/2018 with ~3wks of worsening profuse non-bloody diarrhea, associated cramping abdominal pain, N/V, and a week of acute on chronic cough, SOB, hypoxia requiring intubation, and fevers and was found to have multifocal pneumonia on CT chest due to RSV-A pneumonia with suspected superimposed bacterial pneumonia. He remains critically ill with ongoing pressor requirement, hypoxic RF, CRRT requirement, and minimal hepatitis.  Interval History: Remains intubated with variable pressor requirement (likely due to volume sensitive state on CRRT). Afebrile. Cultures  unremarkable to date. Myelosuppression seems improved/plateaued after some drug adjustments. Ongoing possible DIC noted.    Review of Systems   Unable to perform ROS: Intubated     Objective:     Vital Signs (Most Recent):  Temp: 98.4 °F (36.9 °C) (03/20/18 1500)  Pulse: (!) 112 (03/20/18 1555)  Resp: (!) 28 (03/20/18 1555)  BP: (!) 95/53 (03/20/18 1300)  SpO2: 100 % (03/20/18 1555) Vital Signs (24h Range):  Temp:  [96.8 °F (36 °C)-98.4 °F (36.9 °C)] 98.4 °F (36.9 °C)  Pulse:  [] 112  Resp:  [21-28] 28  SpO2:  [98 %-100 %] 100 %  BP: ()/(49-54) 95/53  Arterial Line BP: ()/(33-71) 105/47     Weight: 76.3 kg (168 lb 3.4 oz)  Body mass index is 26.35 kg/m².    Estimated Creatinine Clearance: 97.9 mL/min (based on SCr of 0.9 mg/dL).    Physical Exam   Constitutional: He appears well-developed and well-nourished. No distress.   Chronically ill appearing. Intubated.   HENT:   Head: Atraumatic.   Mouth/Throat: Oropharynx is clear and moist. No oropharyngeal exudate.   Eyes: Conjunctivae are normal. Pupils are equal, round, and reactive to light. No scleral icterus.   Neck: Neck supple.   Cardiovascular: Normal rate and regular rhythm.  Exam reveals no friction rub.    No murmur heard.  Pulmonary/Chest: No respiratory distress. He has no wheezes. He has rales. He exhibits no tenderness.   Intubated.   Abdominal: Soft. Bowel sounds are normal. He exhibits no distension. There is no tenderness. There is no rebound and no guarding.   Musculoskeletal: Normal range of motion. He exhibits no edema.   Lymphadenopathy:     He has no cervical adenopathy.   Neurological:   Intubated/sedated.   Skin: No rash noted. No erythema.   Mottling of some digits.       Significant Labs:   CBC:     Recent Labs  Lab 03/19/18  2300 03/20/18  0821 03/20/18  1610   WBC 3.47* 4.30 7.20   HGB 9.0* 9.2* 8.6*   HCT 28.3* 28.6* 25.9*   PLT 68* 59* 43*     CMP:   Recent Labs  Lab 03/19/18  0247  03/19/18  2300 03/20/18  0432  03/20/18  1332     144  < > 143 143  143 143   K 3.6  3.6  < > 4.2 4.8  4.8 5.5*     104  < > 105 105  105 102   CO2 24  24  < > 20* 18*  18* 17*   *  125*  < > 174* 153*  153* 176*   BUN 8  8  < > 12 10  10 12   CREATININE 0.8  0.8  < > 0.9 0.9  0.9 0.9   CALCIUM 7.8*  7.8*  < > 8.3* 8.3*  8.3* 8.5*   PROT 5.0*  --   --  5.9*  --    ALBUMIN 2.1*  2.1*  < > 2.6* 2.6*  2.6* 2.8*   BILITOT 2.1*  --   --  2.6*  --    ALKPHOS 155*  --   --  165*  --    *  --   --  124*  --    ALT 42  --   --  51*  --    ANIONGAP 16  16  < > 18* 20*  20* 24*   EGFRNONAA >60.0  >60.0  < > >60.0 >60.0  >60.0 >60.0   < > = values in this interval not displayed.    Significant Imaging: I have reviewed all pertinent imaging results/findings within the past 24 hours.     CT chest:  Status post heart transplantation with new multifocal subsegmental consolidation throughout both lungs.  Similar findings were present on the CT dated 12/12/2017 with partial improvement on 02/14/2018 therefore we doubt malignancy.  Differential considerations include multifocal infectious pneumonia, hemorrhage, or aspiration.  We consider pulmonary edema due to abnormal capillary permeability or cardiac decompensation less likely.  Lymphoma could present similarly although felt less likely due to improvement on CT of 2/14/2018.    Continued moderate sized incompletely dependent right pleural effusion with rounded consolidation in the right lower lobe which probably represents rounded atelectasis, similar to prior, new from 7/19/2016.    Redemonstration of mild scattered ascites in the upper abdomen.     Microbiology:  3/11 stool WBC negative  3/11 stool cx: negative  3/11 O&P negative  3/11 C.diff negative  3/11 Giardia antigen negative  3/11 Cryptosporidium antigen negative  3/11 rotavirus negative  3/11 norovirus negative  3/12 CMV quant negative  3/13 blood cx: NGTD  3/13 sputum cx: NGTD  3/13 aspergillus antigen  negative  3/13 fungitell negative  3/13 urine histo negative  3/13 urine blasto negative  3/13 crypto antigen negative  3/13 urine legionella negative  3/13 RVP + for RSV A  3/13 Strongyloides ab negative  3/13 [place] stool pathogens panel negative  3/14 quantiferon pending  3/14 BAL cx: NGTD, BAL asp ag negative, BAL CMV PCR negative; Legionella cx NGTD

## 2018-03-21 NOTE — PROGRESS NOTES
[facility]  Pulm/Critical Care - Medicine  Progress Note    Patient Name: Lv Crocker  MRN: [id number]  Admission Date: 3/11/2018  Hospital Length of Stay: 9 days  Code Status: Full Code  Attending Provider: Jose A Lloyd MD  Primary Care Provider: Philippe Mohr MD   Principal Problem: Acute respiratory failure with hypoxia    Subjective:   Was seen and examined. Minimal sedation today and pt still not waking up as he should. Opens eyes sometimes to command. Still in DIC and being managed with heme/onc.ID following. CXR much better today. Oxygenation is better.  Review of Systems   Unable to obtain  Objective:     Vital Signs (Most Recent):  Temp: 98.2 °F (36.8 °C) (03/21/18 1000)  Pulse: 109 (03/21/18 1000)  Resp: (!) 23 (03/20/18 1830)  BP: (!) 103/55 (03/21/18 0700)  SpO2: 100 % (03/21/18 1000) Vital Signs (24h Range):  Temp:  [97.2 °F (36.2 °C)-98.4 °F (36.9 °C)] 98.2 °F (36.8 °C)  Pulse:  [] 109  Resp:  [21-28] 23  SpO2:  [98 %-100 %] 100 %  BP: ()/(52-72) 103/55  Arterial Line BP: ()/(33-67) 107/49     Weight: 76.3 kg (168 lb 3.4 oz)  Body mass index is 26.35 kg/m².      Intake/Output Summary (Last 24 hours) at 03/21/18 1054  Last data filed at 03/21/18 1000   Gross per 24 hour   Intake           6319.8 ml   Output             7231 ml   Net           -911.2 ml       Physical Exam   Constitutional: He is oriented to person, place, and time. He appears well-developed and well-nourished.    mechanical ventilation. Off sedation opens eyes sometimes.   HENT:   Head: Normocephalic and atraumatic.   Neck: Normal range of motion. Neck supple. No tracheal deviation present. No thyromegaly present.   Cardiovascular:   tachycardic   Pulmonary/Chest:   On the vent    Abdominal: Soft.   Musculoskeletal: He exhibits no edema.   Neurological: He is alert and oriented to person, place, and time.   Skin: Skin is warm and dry.       Vents:  Vent Mode: A/C (03/21/18  0918)  Ventilator Initiated: Yes (03/14/18 0932)  Set Rate: 23 bmp (03/21/18 0918)  Vt Set: 450 mL (03/21/18 0918)  Pressure Support: 0 cmH20 (03/21/18 0918)  PEEP/CPAP: 5 cmH20 (03/21/18 0918)  Oxygen Concentration (%): 40 (03/21/18 1000)  Peak Airway Pressure: 37 cmH2O (03/21/18 0918)  Plateau Pressure: 26 cmH20 (03/21/18 0918)  Total Ve: 10.9 mL (03/21/18 0918)  F/VT Ratio<105 (RSBI): (!) 48.32 (03/20/18 1830)    Lines/Drains/Airways     Central Venous Catheter Line                 Trialysis (Dialysis) Catheter 03/14/18 0813 right internal jugular 7 days          Drain                 NG/OG Tube 03/14/18 1300 [place] mouth 6 days          Airway                 Airway - Non-Surgical 03/14/18 0930 Endotracheal Tube 7 days          Arterial Line                 Arterial Line 03/14/18 1600 Right Femoral 6 days          Peripheral Intravenous Line                 Peripheral IV - Single Lumen 03/13/18 1600 Left Antecubital 7 days         Peripheral IV - Single Lumen 03/14/18 1736 Right Forearm 6 days         Midline Catheter Insertion/Assessment  - Single Lumen 03/17/18 1454 Left cephalic vein (lateral side of arm) 18g x 8cm 3 days         Peripheral IV - Single Lumen 03/19/18 1813 Left Forearm 1 day         Peripheral IV - Single Lumen 03/19/18 1827 Right Forearm 1 day                Significant Labs:    CBC/Anemia Profile:    Recent Labs  Lab 03/20/18  1610 03/20/18  2305 03/21/18  0756   WBC 7.20 4.88 3.29*   HGB 8.6* 7.9* 7.1*   HCT 25.9* 23.8* 21.9*   PLT 43* 29* 69*   MCV 88 87 89   RDW 16.9* 16.8* 17.0*        Chemistries:    Recent Labs  Lab 03/20/18  0438 03/20/18  1332 03/20/18  2055 03/21/18  0350     143 143 143 144  144   K 4.8  4.8 5.5* 4.5 3.2*  3.2*     105 102 98 98  98   CO2 18*  18* 17* 18* 19*  19*   BUN 10  10 12 10 12  12   CREATININE 0.9  0.9 0.9 0.9 1.0  1.0   CALCIUM 8.3*  8.3* 8.5* 8.3* 8.5*  8.5*   ALBUMIN 2.6*  2.6* 2.8* 2.6* 2.6*  2.6*   PROT 5.9*  --   --   5.7*   BILITOT 2.6*  --   --  3.1*   ALKPHOS 165*  --   --  158*   ALT 51*  --   --  46*   *  --   --  92*   MG 1.7  1.7 1.8 1.5* 2.0  2.0   PHOS 2.7 4.0 2.7 1.9*           Assessment/Plan:   1. ARDS  2. CACHORRO  3. DIC      42 yeear-old male with a PMH of a heart transplant who was admitted to the hospital on 3/11 for diarrhea and cough. He was intubated on 5/14 for hypoxemic and hypercapnic resp failure. His CXR has improved even more today. Trying to hold all sedation so that he can wake up. Likely effect of high dose benzos from last week. Plan:    -Stop all sedation and allow pt to wake up. SBT to follow once pt is more awake. Extubation when heme issues are stable and he is off pressors. Will assess again in am.  -LTV ventilation. TV increased last night. Will decrease to 420 ml today because of resp alkalosis.  -CRRT  -Abs managed by ID   -DVT prophylaxis    We will continue to follow with you. Please keep sedation to minimum and aim for RASS of 0 today. If he is waking up and following commands please call me (numbers given to nurse).    Geri Stringer MD  Critical Care - Medicine  [facility]

## 2018-03-21 NOTE — NURSING
Dr Neal notified of pt PLT 29, Fibrinogen <70, INR 1.7. 1 unit of Cryo ordered.     Pt is currently on .15mcg/kg/min Levo, .04 Vaso to maintain MAP >60. CRRT UF @ 300. Pt extremities remain cool with dusky fingers & toes. Will continue to closely monitor

## 2018-03-21 NOTE — ASSESSMENT & PLAN NOTE
-S/P Bronch and intubation 3/14. Cultures ngtd.   -RSV positive. Completed course of Ribavarin/IVIG.  Per lung transplant protocol for severe RSV(given myelosuppression).   -Aspergill Ag neg from BAL.   -Urine histo/blasto antigens neg, crypto antigen neg, urine legionella neg, and Quantiferon pending.  -Appreciate ID help-->continue empiric vancomycin given GPCC(suspect contaminant but will await speciation). Repeat cultures and if negative, will stop vanc; if grows again, would exchange lines and continue therapy.  -Continue zosyn for now while awaiting final BAL cx results. Stopped azithromycin(he has completed a >5 day course for possible atypical pneumonia without positive Legionella studies)  -Continue empiric posaconazole for now(transitioned to per tube to avoid accumulation to cyclodextrin carrier)  -Stop IV GCV prophylaxis as patient is several years out from transplant without evidence of active CMV disease; will check weekly quants -Await pending tests including: final BAL cultures and Quantiferon   -Pulmonology managing vent. Will stop sedation this am.

## 2018-03-21 NOTE — NURSING
Dr Neal notified of patient's AM lab values: K 3.2 (plans to switch patient's dialysate bath to higher K concentration - no replacement orders necessary per Dr Neal); CO2 28 (plan to keep vent rate @ 23 to correct HCO3). Also notified that patient's Levophed requirements have decreased this AM, currently @ 0.04mcg/kg/min (was 0.2 at beginning of shift - requirements dramatically decreased after transfusing PLT/Cryo). Will continue to closely monitor patient

## 2018-03-21 NOTE — ASSESSMENT & PLAN NOTE
41yo man w/a history of DM1, Hashimoto's thyroiditis, and ICM (s/p OHT 11/18/2014, CMV D+/R+, steroid induction, on maintenance tacro/MMF/pred; c/b early hemorrhagic tamponade requiring washout, delayed closure, and pericardial window and subsequent AF w/RVR, and CACHORRO; late course c/b ABMR 4/2015 s/p rituxan, PLEX, and IVIG; subsequent C.diff/CMV reactivation, restrictive cardiomyopathy with recurrent pleural effusions s/p VATS/pleurex catheter 1/2018 with removal 2/2018 and ascites requiring repeated LVP, and CKD) who was admitted on 3/11/2018 with ~3wks of worsening profuse non-bloody diarrhea, associated cramping abdominal pain, N/V, and a week of acute on chronic cough, SOB, hypoxia requiring intubation, and fevers and was found to have multifocal pneumonia on CT chest due to RSV-A pneumonia with suspected superimposed bacterial pneumonia. He remains critically ill with a course c/b ongoing pressor requirement, hypoxic RF, CRRT requirement, and possible evolving DIC (of note, suspect CONS in blood cx is contaminant but will prove).     - patient been wean of mechanical ventilation; completed ribavirin/IVIG 5 day course for RSV pneumonia  - continue vancomycin for now given CONS that resulted from B/C 3/18/18, B/C repeated 3/21/18 will follow; if negative, can stop vancomcyin (given that growth occurred already on this agent); if grows again, would exchange lines and continue therapy  - BAL continues negative so far; continue zosyn for now while awaiting final results  - continue empiric posaconazole per tube to avoid accumulation to cyclodextrin carrier -- fungal markers negative, fungal culture pending  - continue to check CMV weekly during this episode of acute illness as he is at risk of reactivation  - follow Quantiferon gold

## 2018-03-21 NOTE — PROGRESS NOTES
[facility]  Heart Transplant  Progress Note    Patient Name: Lv Crocker  MRN: [id number]  Admission Date: 3/11/2018  Hospital Length of Stay: 9 days  Attending Physician: Jose A Lloyd MD  Primary Care Provider: Philippe Mohr MD  Principal Problem:Acute respiratory failure with hypoxia    Subjective:     Interval History: No issues overnight. Intubated/sedated. Withdraws to pain.     Continuous Infusions:   sodium chloride 0.9% 200 mL/hr at 03/20/18 1315    dexmedetomidine (PRECEDEX) infusion Stopped (03/21/18 0750)    fentanyl Stopped (03/20/18 0803)    insulin (HUMAN R) infusion (adults) 0.5 Units/hr (03/21/18 0800)    norepinephrine bitartrate-D5W 0.02 mcg/kg/min (03/21/18 0733)    vasopressin (PITRESSIN) infusion 0.04 Units/min (03/21/18 0733)     Scheduled Meds:   chlorhexidine  15 mL Mouth/Throat BID    docusate  100 mg Per NG tube BID    escitalopram oxalate  20 mg Per NG tube Daily    famotidine (PF)  20 mg Intravenous Daily    hydrocortisone sodium succinate  100 mg Intravenous Q8H    levothyroxine  125 mcg Per NG tube Before breakfast    piperacillin-tazobactam (ZOSYN) IVPB  4.5 g Intravenous Q8H    polyethylene glycol  17 g Per NG tube Daily    posaconazole 200 mg/5ml (40 mg/ml)  200 mg Per NG tube TID WM    sodium chloride 0.9%  3 mL Intravenous Q8H    vancomycin (VANCOCIN) IVPB  1,000 mg Intravenous Q24H     PRN Meds:sodium chloride, sodium chloride, sodium chloride, sodium chloride, sodium chloride, dextrose 50%, dextrose 50%, glucagon (human recombinant), glucose, glucose, k phos di & mono-sod phos mono    Review of patient's allergies indicates:   Allergen Reactions    No known drug allergies      Objective:     Vital Signs (Most Recent):  Temp: 97.7 °F (36.5 °C) (03/21/18 0800)  Pulse: 91 (03/21/18 0800)  Resp: (!) 23 (03/20/18 1830)  BP: (!) 103/55 (03/21/18 0700)  SpO2: 100 % (03/21/18 0800) Vital Signs (24h Range):  Temp:  [97.2 °F (36.2  °C)-98.4 °F (36.9 °C)] 97.7 °F (36.5 °C)  Pulse:  [] 91  Resp:  [21-28] 23  SpO2:  [98 %-100 %] 100 %  BP: ()/(49-72) 103/55  Arterial Line BP: ()/(33-67) 106/50     Patient Vitals for the past 72 hrs (Last 3 readings):   Weight   03/19/18 0913 76.3 kg (168 lb 3.4 oz)     Body mass index is 26.35 kg/m².      Intake/Output Summary (Last 24 hours) at 03/21/18 0834  Last data filed at 03/21/18 0800   Gross per 24 hour   Intake          6142.08 ml   Output             7421 ml   Net         -1278.92 ml        Telemetry: ST  Physical Exam   Constitutional: He appears well-developed and well-nourished.   Appears ill   HENT:   Head: Normocephalic and atraumatic.   Eyes: Conjunctivae are normal. Pupils are equal, round, and reactive to light.   Neck: No thyromegaly present.   RIJ trialysis   Cardiovascular: Regular rhythm.    Pulmonary/Chest:   Intubated and ventilated  Breath sounds coarse bilaterally   Abdominal: Soft.   No bowel sounds. + ascites   Musculoskeletal:   1-2+ BUE edema; no MITZY; no cyanosis   Neurological:   Withdraws to pain   Skin: Skin is warm and dry. Capillary refill takes less than 2 seconds.       Significant Labs:  CBC:    Recent Labs  Lab 03/20/18  1610 03/20/18  2305 03/21/18  0756   WBC 7.20 4.88 3.29*   RBC 2.96* 2.75* 2.47*   HGB 8.6* 7.9* 7.1*   HCT 25.9* 23.8* 21.9*   PLT 43* 29* 69*   MCV 88 87 89   MCH 29.1 28.7 28.7   MCHC 33.2 33.2 32.4     BNP:  No results for input(s): BNP in the last 168 hours.    Invalid input(s): BNPTRIAGELBLO  CMP:    Recent Labs  Lab 03/19/18  0247  03/20/18  0438 03/20/18  1332 03/20/18  2055 03/21/18  0350   *  125*  < > 153*  153* 176* 169* 166*  166*   CALCIUM 7.8*  7.8*  < > 8.3*  8.3* 8.5* 8.3* 8.5*  8.5*   ALBUMIN 2.1*  2.1*  < > 2.6*  2.6* 2.8* 2.6* 2.6*  2.6*   PROT 5.0*  --  5.9*  --   --  5.7*     144  < > 143  143 143 143 144  144   K 3.6  3.6  < > 4.8  4.8 5.5* 4.5 3.2*  3.2*   CO2 24  24  < > 18*  18* 17*  18* 19*  19*     104  < > 105  105 102 98 98  98   BUN 8  8  < > 10  10 12 10 12  12   CREATININE 0.8  0.8  < > 0.9  0.9 0.9 0.9 1.0  1.0   ALKPHOS 155*  --  165*  --   --  158*   ALT 42  --  51*  --   --  46*   *  --  124*  --   --  92*   BILITOT 2.1*  --  2.6*  --   --  3.1*   < > = values in this interval not displayed.   Coagulation:     Recent Labs  Lab 03/20/18  0821 03/20/18  1610 03/20/18  2305   INR 1.5* 1.7* 1.7*     LDH:    Recent Labs  Lab 03/18/18  2222   *     Microbiology:  Microbiology Results (last 7 days)     Procedure Component Value Units Date/Time    Blood culture [[id number]] Collected:  03/21/18 0815    Order Status:  Sent Specimen:  Blood from Peripheral, Forearm, Left Updated:  03/21/18 0826    Blood culture [[id number]] Collected:  03/18/18 0618    Order Status:  Completed Specimen:  Blood from Peripheral, Hand, Left Updated:  03/20/18 1149     Blood Culture, Routine Gram stain aer bottle: Gram positive cocci in clusters resembling Staph      Blood Culture, Routine Results called to and read back by:Omid Cody RN 03/19/2018  10:55     Blood Culture, Routine --     COAGULASE-NEGATIVE STAPHYLOCOCCUS SPECIES  Organism is a probable contaminant      Blood culture [[id number]] Collected:  03/18/18 0748    Order Status:  Completed Specimen:  Blood from Peripheral, Hand, Right Updated:  03/20/18 1013     Blood Culture, Routine No Growth to date     Blood Culture, Routine No Growth to date     Blood Culture, Routine No Growth to date    Blood culture [[id number]] Collected:  03/13/18 1637    Order Status:  Completed Specimen:  Blood from Peripheral, Antecubital, Left Updated:  03/18/18 2012     Blood Culture, Routine No growth after 5 days.    Blood culture [[id number]] Collected:  03/13/18 1701    Order Status:  Completed Specimen:  Blood from Peripheral, Forearm, Left Updated:  03/18/18 2012     Blood Culture, Routine No growth after 5 days.    Blood culture [[id number]]      Order Status:  Canceled Specimen:  Blood     Blood culture [[id number]]     Order Status:  Canceled Specimen:  Blood     Culture, Respiratory with Gram Stain [[id number]] Collected:  03/15/18 1541    Order Status:  Completed Specimen:  Respiratory from Sputum Updated:  03/17/18 0914     Respiratory Culture No growth     Gram Stain (Respiratory) <10 epithelial cells per low power field.     Gram Stain (Respiratory) Rare WBC's      Gram Stain (Respiratory) No organisms seen    Culture, Respiratory with Gram Stain [[id number]] Collected:  03/14/18 1137    Order Status:  Completed Specimen:  Respiratory from BAL, TRIPP Updated:  03/16/18 1046     Respiratory Culture No growth     Gram Stain (Respiratory) No WBC's     Gram Stain (Respiratory) No organisms seen    AFB Culture & Smear [[id number]] Collected:  03/14/18 1137    Order Status:  Completed Specimen:  Bronchial Wash from Amniotic Fluid Updated:  03/15/18 2127     AFB Culture & Smear Culture in progress     AFB CULTURE STAIN No acid fast bacilli seen.    Respiratory Viral Panel by PCR [facility]; Nasal Swab [[id number]]  (Abnormal) Collected:  03/13/18 1733    Order Status:  Completed Specimen:  Respiratory Updated:  03/15/18 1302     Respiratory Virus Panel, source Nasal Swab     RVP - Adenovirus Not Detected     Comment: Detects Serotypes B and E. Detection of Serotype C may   be limited. If Adenovirus infection is suspected and a   Not Detected result is returned the sample should be   re-tested for Adenovirus using an independent method  (e.g. [employer] Adenovirus Quantitative Real-Time  PCR test.          Enterovirus Not Detected     Comment: Cross-reactivity has been observed between certain Rhinovirus  strains and the Enterovirus assay.          Human Bocavirus Not Detected     Human Coronavirus Not Detected     Comment: The Human Coronavirus assay detects Human coronavirus types  229E, OC43,NL63 and HKU1.          RVP - Human Metapneumovirus (hMPV) Not  Detected     RVP - Influenza A Not Detected     Influenza A - F7D6-22 Not Detected     RVP - Influenza B Not Detected     Parainfluenza Not Detected     Respiratory Syncytial VirusVirus (RSV) A Positive (A)     Comment: The Respiratory Syncytial Viral assay detects types A and B,  however it does not distinguish between the two.          RVP - Rhinovirus Not Detected     Comment: Cross-Reactivity has been observed between certain   Rhinovirus strains and the Enterovirus assay.  Target Enriched Mulitplex Polymerase Chain Reaction (TEM-PCR)  allows for the detection of multiple pathogens out of a single  reaction.  This test was developed and its performance   characteristics determined by [employer].  It has not   been cleared or approved by the U.S.Food and Drug Administration.  Results should be used in conjunction with clinical findings,   and should not form the sole basis for a diagnosis or treatment  decision.  TEM-PCR is a licensed technology of [employer].         Narrative:       Receiving Lab:->[facility]    Culture, Respiratory with Gram Stain [[id number]] Collected:  03/13/18 1441    Order Status:  Completed Specimen:  Respiratory from Sputum, Expectorated Updated:  03/15/18 1237     Respiratory Culture Normal respiratory ahnk     Gram Stain (Respiratory) <10 epithelial cells per low power field.     Gram Stain (Respiratory) Rare WBC's     Gram Stain (Respiratory) Moderate Gram positive cocci     Gram Stain (Respiratory) Many Gram negative rods    Fungus culture [[id number]] Collected:  03/14/18 1137    Order Status:  Completed Specimen:  Bronchial Wash from Amniotic Fluid Updated:  03/15/18 1124     Fungus (Mycology) Culture Culture in progress    Stool culture **CANNOT BE ORDERED STAT** [[id number]] Collected:  03/11/18 1923    Order Status:  Completed Specimen:  Stool from Stool Updated:  03/14/18 1549     Stool Culture No enteric hank     Stool Culture No  Salmonella,Shigella,Vibrio,Campylobacter,Yersinia isolated.    Culture, Fluid  (Aerobic) [[id number]] Collected:  03/14/18 1137    Order Status:  Canceled Specimen:  Bronchial Wash from Amniotic Fluid Updated:  03/14/18 1416    Cryptococcal antigen [[id number]] Collected:  03/14/18 0425    Order Status:  Completed Specimen:  Blood from Blood Updated:  03/14/18 1217     Cryptococcal Ag, Blood Negative          I have reviewed all pertinent labs within the past 24 hours.    Estimated Creatinine Clearance: 88.1 mL/min (based on SCr of 1 mg/dL).    Diagnostic Results:  I have reviewed all pertinent imaging results/findings within the past 24 hours.    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 Most recently has had ongoing issues with his lungs, had gotten 2 thoracenteses, after which he underwent VATS 01/19/18 followed by pleurex catheter placement and effusion drainage 01/11/18, subsequently had it removed 02/07/18 after drainage had decreased despite multiple attempts to drain it. Has been having significant coughing fits that result in him being near-syncopal at times, although difficult to say if he has passed out or not- patient most recently was admitted to the hospital for increased AGUILAR, coughing and [place] [facility] [place]   Patient was started on antibiotics for suspected bacterial infection, with some diarrhea, bronchitis, and possible pneumonia. Ct chest showed some pleural fluid collection and after talking with Dr. James, we arranged for him to receive a diagnostic tap with IR, from which the fluid collection demonstrated no growth or significant findings at all really. Cell counts do not appear to have been sent but will recheck. Patient states since his hospital stay, yesterday had a significant  coughing fit again, after which he nearly passed out, is being seen in clinic today for this. Denies any cardiopulmonary complaints, no leg swelling, has some abdominal swelling, which is moderate for him. Denies significant shortness of breath with mild exertion, had 6MWT yesterday to see if he qualified for home O2, and his O2 sats actually improved after recovery from where he started initially. He and his wife admit he is in bed most days, not very active.     Mr. Crocker presented to the ED 3/11/18 with approximately 3 weeks of worsening diarrhea and 2-3 days of sinus pressure, drainage, and worsened cough.  He notes that he had a coughing fit last night and passed out, coughed throughout most of the night.  This also happened on 2/25/18.  He last saw Dr Ferreira in clinic on 2/20/18 where he was taken off myfortic and switched to cellcept (he hadn't been on cellcept because of leukopenia when they tried post-transplant).  Though his diarrhea had improved after his last discharge, he states it has increased now to 15x/day, clear/yellow/green, no fevers, no exacerbating foods per say.  He was taken back off the cellcept and placed back on myfortic this past week and has not noticed immediate change in diarrhea. He also reports his baseline coughing fits havent improved and are minimally responsive to tessalon pearls.  Came on last night, he lost consciousness during a fit once which is relatively normal for him, and had two more during HPI.  He notes no sick contacts but does state he's had some URI symptoms as above.  His baseline vitals are usually -120 and BP 90s/60s-110s/70s.  He reports a history of intermittent diarrhea - did have Cdiff many years ago but this smells different.  No abdominal pain, no nausea, no vomiting.  No blood in diarrhea.  Appears last c-scope in 2015 with no evidence of infection or inflammatory processes.  He does report IBS which is different that this.       * Acute  respiratory failure with hypoxia    -S/P Bronch and intubation 3/14. Cultures ngtd.   -RSV positive. Completed course of Ribavarin/IVIG.  Per lung transplant protocol for severe RSV(given myelosuppression).   -Aspergill Ag neg from BAL.   -Urine histo/blasto antigens neg, crypto antigen neg, urine legionella neg, and Quantiferon pending.  -Appreciate ID help-->continue empiric vancomycin given GPCC(suspect contaminant but will await speciation). Repeat cultures and if negative, will stop vanc; if grows again, would exchange lines and continue therapy.  -Continue zosyn for now while awaiting final BAL cx results. Stopped azithromycin(he has completed a >5 day course for possible atypical pneumonia without positive Legionella studies)  -Continue empiric posaconazole for now(transitioned to per tube to avoid accumulation to cyclodextrin carrier)  -Stop IV GCV prophylaxis as patient is several years out from transplant without evidence of active CMV disease; will check weekly quants -Await pending tests including: final BAL cultures and Quantiferon   -Pulmonology managing vent. Will stop sedation this am.         Septic shock    -Continue Levo for MAP 60. Continue Vaso 0.04u/hr.  -Continue stress dose hydrocortisone 100mg TID.  -Appreciate ID assistance. Abx adjustments as above.  -Will need to change out lines soon.        Acute renal failure superimposed on stage 4 chronic kidney disease    - Appreciate Neph Cx. Continue CRRT as tolerated.         Heart transplanted    - TTE 3/12/18 showed LVEF 50-55% (but no substantial change when compared to previous echo), RV normal and IVC 8  - Had -ve DSE on 11/30/17  - Current immunosuppression: Pred/Prograf/MMF on hold. Continue hydrocortisone 100mg Q8H.         Pancytopenia    - Appreciate Hem Cx.   -The patient developed thrombocytopenia 5 days after receiving enoxaparin and has a 4T score of 5 indicating a ~ 14% chance of HIT.  The patients HIT panel was positive with OD  of 0.431 indicating a 1-5% chance of having HIT.  Would wait for HILDA before considering anticoagulation with argatroban.   -The patients thrombocytopenia could be drug induced as well.   -The patient has a low fibrinogen, elevated INR, shistocytes on peripheral smear and elevated LDH which all point towards DIC. The haptoglobin is elevated which goes against hemolytic anemia,; however, haptoglobin is also an acute phase reactant.  As DIC is the higher on the differential would treat as such by treating underlying cause which is likely sepsis.       -Fibrinogen, INR and plt count every 8 hours.  -Give cryoprecipitate to get fibrinogen over 100.  Would give one unit at a time.  -Give FFP to get the INR below 1.8.  Would give at least two units at a time.  May consider giving the patient Vitamin K given recent use of coumadin.          Restrictive cardiomyopathy    -S/P thoracentesis X 2, followed by VATS/pleurex cath placement (January 2018) with removal of pleurex (February 2018) and 3rd thoracentesis 2/14/18 with no significant findings on fluid removal. Moderate right pleural effusion stable by CXR 3/11/18 and chest CT 3/13/18  -Last paracentesis was in January. Abd US 3/12/18 confirmed ongoing ascites (not tense at all). Will consider getting paracentesis this admit  -Continue Levophed for MAP 60. Vaso 0.04u/h.        Ileus    -Had 2 BMs yesterday,  -Will try tube feeds again today.  - Continue bowel program for now        Type 1 diabetes mellitus with stage 3 chronic kidney disease    - Appreciate Endocrine's recs        Hypothyroidism due to Hashimoto's thyroiditis    - TSH low at 0.101 with normal FT4. Appreciate Endocrine's recs            Tim Mao NP  Heart Transplant  [facility]

## 2018-03-21 NOTE — NURSING
Post Heart Transplant Coordinator Note    Visited with pt's wife and son at the bedside. Pt's wife states she is feeling better and has family around to help her cope. She stated pt is starting to wake up so that he can be extubated. Provided emotional support and informed her if she needs anything to call. She stated the CMICU staff and doctors have been wonderful.

## 2018-03-21 NOTE — SUBJECTIVE & OBJECTIVE
"Interval HPI:   Overnight events:  remains intubated, sedated, getting TF and on intensive with minimal requirements between 0.5-0.9    BP (!) 103/55 (BP Location: Right arm, Patient Position: Lying)   Pulse 103   Temp 97.9 °F (36.6 °C)   Resp (!) 23   Ht 5' 7" (1.702 m)   Wt 76.3 kg (168 lb 3.4 oz)   SpO2 100%   BMI 26.35 kg/m²     Labs Reviewed and Include      Recent Labs  Lab 03/21/18  0350   *  166*   CALCIUM 8.5*  8.5*   ALBUMIN 2.6*  2.6*   PROT 5.7*     144   K 3.2*  3.2*   CO2 19*  19*   CL 98  98   BUN 12  12   CREATININE 1.0  1.0   ALKPHOS 158*   ALT 46*   AST 92*   BILITOT 3.1*     Lab Results   Component Value Date    WBC 3.29 (L) 03/21/2018    HGB 7.1 (L) 03/21/2018    HCT 21.9 (L) 03/21/2018    MCV 89 03/21/2018    PLT 69 (L) 03/21/2018     No results for input(s): TSH, FREET4 in the last 168 hours.  Lab Results   Component Value Date    HGBA1C 6.9 (H) 02/11/2018       Nutritional status:   Body mass index is 26.35 kg/m².  Lab Results   Component Value Date    ALBUMIN 2.6 (L) 03/21/2018    ALBUMIN 2.6 (L) 03/21/2018    ALBUMIN 2.6 (L) 03/20/2018     Lab Results   Component Value Date    PREALBUMIN 5 (L) 03/15/2018    PREALBUMIN 18 (L) 03/24/2015    PREALBUMIN 19 (L) 12/17/2014       Estimated Creatinine Clearance: 88.1 mL/min (based on SCr of 1 mg/dL).    Accu-Checks  Recent Labs      03/20/18   1441  03/20/18   1616  03/20/18   1838  03/20/18   1956  03/20/18   2301  03/20/18   2303  03/21/18   0151  03/21/18   0348  03/21/18   0546  03/21/18   0752   POCTGLUCOSE  161*  157*  148*  162*  211*  183*  183*  166*  159*  132*       Current Medications and/or Treatments Impacting Glycemic Control  Immunotherapy:  Immunosuppressants     None        Steroids:   Hormones     Start     Stop Route Frequency Ordered    03/17/18 1530  vasopressin (PITRESSIN) 0.2 Units/mL in dextrose 5 % 100 mL infusion      -- IV Continuous 03/17/18 1424    03/15/18 1400  hydrocortisone sodium " succinate injection 100 mg      -- IV Every 8 hours 03/15/18 1224        Pressors:    Autonomic Drugs     Start     Stop Route Frequency Ordered    03/14/18 1045  norepinephrine 16 mg in dextrose 5 % 250 mL infusion     Question Answer Comment   Titrate by: (in mcg/kg/min) 0.02    Titrate interval: (in minutes) 2    Titrate to maintain: (MAP or SBP) MAP    Greater than: (in mmHg) 60    Maximum dose: (in mcg/kg/min) 3        -- IV Continuous 03/14/18 0937        Hyperglycemia/Diabetes Medications: Antihyperglycemics     Start     Stop Route Frequency Ordered    03/14/18 1630  insulin regular (Humulin R) 100 Units in sodium chloride 0.9% 100 mL infusion     Question:  Insulin Rate Adjustment (DO NOT MODIFY ANSWER)  Answer:  [email or web address]    -- IV Continuous 03/14/18 1537

## 2018-03-21 NOTE — SUBJECTIVE & OBJECTIVE
Interval History: patient been wean off ventilator, continues on minimal ventilatory settings, vasopressors been wean off    Review of Systems   Unable to perform ROS: Intubated     Objective:     Vital Signs (Most Recent):  Temp: 97.7 °F (36.5 °C) (03/21/18 1500)  Pulse: (!) 121 (03/21/18 1500)  Resp: (!) 23 (03/20/18 1830)  BP: 106/62 (03/21/18 1500)  SpO2: 100 % (03/21/18 1500) Vital Signs (24h Range):  Temp:  [97.2 °F (36.2 °C)-98.2 °F (36.8 °C)] 97.7 °F (36.5 °C)  Pulse:  [] 121  Resp:  [23-28] 23  SpO2:  [98 %-100 %] 100 %  BP: (102-136)/(55-72) 106/62  Arterial Line BP: (106-152)/(42-67) 111/51     Weight: 76.3 kg (168 lb 3.4 oz)  Body mass index is 26.35 kg/m².    Estimated Creatinine Clearance: 110.2 mL/min (based on SCr of 0.8 mg/dL).    Physical Exam   Constitutional: He is sedated and intubated.   Eyes: No scleral icterus.   Cardiovascular: Normal rate and regular rhythm.    Pulmonary/Chest: He is intubated. He has no wheezes. He has no rales.   Abdominal: Soft. He exhibits no distension and no mass. There is no rebound.   Musculoskeletal: He exhibits no edema.   Lymphadenopathy:     He has no cervical adenopathy.   Skin: No rash noted. No erythema.       Significant Labs:   Bilirubin:   Recent Labs  Lab 03/13/18  2334  03/17/18  2231 03/18/18  0411 03/19/18  0247 03/20/18  0438 03/21/18  0350   BILIDIR 0.9*  --   --   --   --   --   --    BILITOT 1.1*  < > 2.1* 2.1* 2.1* 2.6* 3.1*   < > = values in this interval not displayed.  Blood Culture:   Recent Labs  Lab 02/11/18  1115 03/13/18  1637 03/13/18  1701 03/18/18  0618 03/18/18  0748   LABBLOO No growth after 5 days. No growth after 5 days. No growth after 5 days. Gram stain aer bottle: Gram positive cocci in clusters resembling Staph   Results called to and read back by:Omid Cody RN 03/19/2018  10:55  COAGULASE-NEGATIVE STAPHYLOCOCCUS SPECIESOrganism is a probable contaminant No Growth to date  No Growth to date  No Growth to date  No  Growth to date     BMP:   Recent Labs  Lab 03/21/18  1351   *      K 4.2   CL 96   CO2 26   BUN 7   CREATININE 0.8   CALCIUM 8.0*   MG 1.6     CBC:   Recent Labs  Lab 03/20/18  2305 03/21/18  0756 03/21/18  1508   WBC 4.88 3.29* 3.64*   HGB 7.9* 7.1* 7.4*   HCT 23.8* 21.9* 22.3*   PLT 29* 69* 61*     CMP:   Recent Labs  Lab 03/20/18  0438  03/20/18  2055 03/21/18  0350 03/21/18  1351     143  < > 143 144  144 144   K 4.8  4.8  < > 4.5 3.2*  3.2* 4.2     105  < > 98 98  98 96   CO2 18*  18*  < > 18* 19*  19* 26   *  153*  < > 169* 166*  166* 165*   BUN 10  10  < > 10 12  12 7   CREATININE 0.9  0.9  < > 0.9 1.0  1.0 0.8   CALCIUM 8.3*  8.3*  < > 8.3* 8.5*  8.5* 8.0*   PROT 5.9*  --   --  5.7*  --    ALBUMIN 2.6*  2.6*  < > 2.6* 2.6*  2.6* 2.7*   BILITOT 2.6*  --   --  3.1*  --    ALKPHOS 165*  --   --  158*  --    *  --   --  92*  --    ALT 51*  --   --  46*  --    ANIONGAP 20*  20*  < > 27* 27*  27* 22*   EGFRNONAA >60.0  >60.0  < > >60.0 >60.0  >60.0 >60.0   < > = values in this interval not displayed.  Microbiology Results (last 7 days)     Procedure Component Value Units Date/Time    Blood culture [[id number]] Collected:  03/18/18 0618    Order Status:  Completed Specimen:  Blood from Peripheral, Hand, Left Updated:  03/21/18 1108     Blood Culture, Routine Gram stain aer bottle: Gram positive cocci in clusters resembling Staph      Blood Culture, Routine Results called to and read back by:Omid Cody RN 03/19/2018  10:55     Blood Culture, Routine --     COAGULASE-NEGATIVE STAPHYLOCOCCUS SPECIES  Organism is a probable contaminant      Blood culture [[id number]] Collected:  03/18/18 0748    Order Status:  Completed Specimen:  Blood from Peripheral, Hand, Right Updated:  03/21/18 1012     Blood Culture, Routine No Growth to date     Blood Culture, Routine No Growth to date     Blood Culture, Routine No Growth to date     Blood Culture, Routine No Growth to  date    Blood culture [[id number]] Collected:  03/21/18 0815    Order Status:  Sent Specimen:  Blood from Peripheral, Forearm, Left Updated:  03/21/18 0838    Blood culture [[id number]] Collected:  03/13/18 1637    Order Status:  Completed Specimen:  Blood from Peripheral, Antecubital, Left Updated:  03/18/18 2012     Blood Culture, Routine No growth after 5 days.    Blood culture [[id number]] Collected:  03/13/18 1701    Order Status:  Completed Specimen:  Blood from Peripheral, Forearm, Left Updated:  03/18/18 2012     Blood Culture, Routine No growth after 5 days.    Blood culture [[id number]]     Order Status:  Canceled Specimen:  Blood     Blood culture [[id number]]     Order Status:  Canceled Specimen:  Blood     Culture, Respiratory with Gram Stain [[id number]] Collected:  03/15/18 1541    Order Status:  Completed Specimen:  Respiratory from Sputum Updated:  03/17/18 0914     Respiratory Culture No growth     Gram Stain (Respiratory) <10 epithelial cells per low power field.     Gram Stain (Respiratory) Rare WBC's      Gram Stain (Respiratory) No organisms seen    Culture, Respiratory with Gram Stain [[id number]] Collected:  03/14/18 1137    Order Status:  Completed Specimen:  Respiratory from BAL, TRIPP Updated:  03/16/18 1046     Respiratory Culture No growth     Gram Stain (Respiratory) No WBC's     Gram Stain (Respiratory) No organisms seen    AFB Culture & Smear [[id number]] Collected:  03/14/18 1137    Order Status:  Completed Specimen:  Bronchial Wash from Amniotic Fluid Updated:  03/15/18 2127     AFB Culture & Smear Culture in progress     AFB CULTURE STAIN No acid fast bacilli seen.    Respiratory Viral Panel by PCR [facility]; Nasal Swab [[id number]]  (Abnormal) Collected:  03/13/18 1733    Order Status:  Completed Specimen:  Respiratory Updated:  03/15/18 1302     Respiratory Virus Panel, source Nasal Swab     RVP - Adenovirus Not Detected     Comment: Detects Serotypes B and E. Detection of Serotype C may   be  limited. If Adenovirus infection is suspected and a   Not Detected result is returned the sample should be   re-tested for Adenovirus using an independent method  (e.g. [employer] Adenovirus Quantitative Real-Time  PCR test.          Enterovirus Not Detected     Comment: Cross-reactivity has been observed between certain Rhinovirus  strains and the Enterovirus assay.          Human Bocavirus Not Detected     Human Coronavirus Not Detected     Comment: The Human Coronavirus assay detects Human coronavirus types  229E, OC43,NL63 and HKU1.          RVP - Human Metapneumovirus (hMPV) Not Detected     RVP - Influenza A Not Detected     Influenza A - D6C5-94 Not Detected     RVP - Influenza B Not Detected     Parainfluenza Not Detected     Respiratory Syncytial VirusVirus (RSV) A Positive (A)     Comment: The Respiratory Syncytial Viral assay detects types A and B,  however it does not distinguish between the two.          RVP - Rhinovirus Not Detected     Comment: Cross-Reactivity has been observed between certain   Rhinovirus strains and the Enterovirus assay.  Target Enriched Mulitplex Polymerase Chain Reaction (TEM-PCR)  allows for the detection of multiple pathogens out of a single  reaction.  This test was developed and its performance   characteristics determined by [employer].  It has not   been cleared or approved by the U.[place].Food and Drug Administration.  Results should be used in conjunction with clinical findings,   and should not form the sole basis for a diagnosis or treatment  decision.  TEM-PCR is a licensed technology of [employer].         Narrative:       Receiving Lab:->[facility]    Culture, Respiratory with Gram Stain [[id number]] Collected:  03/13/18 1441    Order Status:  Completed Specimen:  Respiratory from Sputum, Expectorated Updated:  03/15/18 1237     Respiratory Culture Normal respiratory hank     Gram Stain (Respiratory) <10 epithelial cells per low power field.      Gram Stain (Respiratory) Rare WBC's     Gram Stain (Respiratory) Moderate Gram positive cocci     Gram Stain (Respiratory) Many Gram negative rods    Fungus culture [[id number]] Collected:  03/14/18 1137    Order Status:  Completed Specimen:  Bronchial Wash from Amniotic Fluid Updated:  03/15/18 1124     Fungus (Mycology) Culture Culture in progress          Significant Imaging: I have reviewed all pertinent imaging results/findings within the past 24 hours.

## 2018-03-21 NOTE — PLAN OF CARE
Problem: Patient Care Overview  Goal: Plan of Care Review  Outcome: Ongoing (interventions implemented as appropriate)  Neuro: Pt remains on low dose of PRECEDEX. Patient withdraws to pain intermittently and occasionally opens eyes. Fentanyl off for duration of shift. Patient appears comfortable      Pulmonary:Breath sounds clear & diminished; ETT 24 @ lips, A/C, R23, PEEP 5, O2 40%, . Pt sat >99% throughout shift. Pt breathing over vent intermittently. See flowsheet for AM ABG. Plans for SBT this AM      Cardiovascular: LEVO titrated throughout shift to maintain MAP >60; currently infusing @ 0.02mcg/kg/min (decreased requirements from previous shift). VASO (NON-TITRATING) infusing as well. SVO2 = 74 this morning. CBC being monitored q8 hours - 1 Cryo, 1 PLT transfused throughout shift- repeat @ 0800. Temp monitored via esophageal temp probe; warming blanket required intermittently. Patient NSR to ST. CVP 12-14. Right Fem Ignacio intact, pulsatile blood flow. Extremities cool & dusky.         Gastrointestinal: OG tube at 60 cm, TF (trickle feeds) held throughout shifts due to large residuals. Will re-check with AM assessment. Patient had large, loose BM this AM.         Genitourinary: Anuric; Patient on continuous CRRT on SLED of  (goal). No issues with flows overnight.      Endocrine: Insulin gtt; CBG q 2 hours. Currently infusing @ 0.9 - orders per NEPH to not decrease gtt below .1      Integumentary/Other: Skin intact; CHG bath given       Infusions: Precedex, Vaso, Levo, Insulin, KVO (abx)     POC reviewed with patient & wife @ bedside.

## 2018-03-21 NOTE — PLAN OF CARE
Problem: Patient Care Overview  Goal: Plan of Care Review  Outcome: Ongoing (interventions implemented as appropriate)  Plan of care reviewed with patient, wife, & family. All sedation turned off since 0800. Patient becoming more awake as shift progresses. At best, patient moving upper head spontaneously, turns head side to side, facial grimacing to pain, & withdraws from pain in lower extremities. Tube feeds restarted- max residual: 250. Levophed turned off; Vasopressin gtt kept on per [place] NP verbal order. Of note: wife communicated past intubation for heart transplant patient had spinal stenosis, which left him unable to move arms & legs.

## 2018-03-21 NOTE — ASSESSMENT & PLAN NOTE
- Appreciate Hem Cx.   -The patient developed thrombocytopenia 5 days after receiving enoxaparin and has a 4T score of 5 indicating a ~ 14% chance of HIT.  The patients HIT panel was positive with OD of 0.431 indicating a 1-5% chance of having HIT.  Would wait for HILDA before considering anticoagulation with argatroban.   -The patients thrombocytopenia could be drug induced as well.   -The patient has a low fibrinogen, elevated INR, shistocytes on peripheral smear and elevated LDH which all point towards DIC. The haptoglobin is elevated which goes against hemolytic anemia,; however, haptoglobin is also an acute phase reactant.  As DIC is the higher on the differential would treat as such by treating underlying cause which is likely sepsis.       -Fibrinogen, INR and plt count every 8 hours.  -Give cryoprecipitate to get fibrinogen over 100.  Would give one unit at a time.  -Give FFP to get the INR below 1.8.  Would give at least two units at a time.  May consider giving the patient Vitamin K given recent use of coumadin.

## 2018-03-21 NOTE — PROGRESS NOTES
"[facility]  Endocrinology  Progress Note    Admit Date: 3/11/2018     Reason for Consult: abnormal TFTs    Surgical Procedure and Date: s/p heart transplant 2014     Diabetes diagnosis year: 7yo (T1DM)     Home Diabetes Medications:    Levemir 15u qHS  Novolog 4u AC     How often checking glucose at home? 4 times daily   BG readings on regimen: 150-200s  Hypoglycemia on the regimen?  Yes, rarely - treats appropriately (light snack, glucose tab)  Missed doses on regimen?  No        Diabetes Complications include:     Hyperglycemia, Hypoglycemia  and Diabetic chronic kidney disease          Complicating diabetes co morbidities:   N/A     HPI:   Patient is a 42 y.o. male with a diagnosis of T1DM, HTN, hypothyroidism, s/p heart transplant.  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 He required serial paracentesis and thoracentesis until he underwent a VATS with pleurX catheter placement on 1/11/2018 and removed 2/7/18.       Presented to hospital secondary to diarrhea and cough.  Upon initial labs, TSH was decreased (0.101) and free T4 1.33.  Endocrinology consulted to assist in management of these labs.  He was last seen in clinic by Kamala Vázquez NP 11/2017.   Previous hospitalization, endocrine consulted for same problem.  During that visit, recommended decreasing LT4 to 125mcg daily. Unfortunately, patient was discharged on previous dose of 150mcg daily.     Interval HPI:   Overnight events:  remains intubated, sedated, getting TF and on intensive with minimal requirements between 0.5-0.9    BP (!) 103/55 (BP Location: Right arm, Patient Position: Lying)   Pulse 103   Temp 97.9 °F (36.6 °C)   Resp (!) 23   Ht 5' 7" (1.702 m)   Wt 76.3 kg (168 lb 3.4 oz)   SpO2 100%   BMI 26.35 kg/m²       Labs Reviewed and Include      Recent Labs  Lab 03/21/18  0350   *  166*   CALCIUM 8.5* "  8.5*   ALBUMIN 2.6*  2.6*   PROT 5.7*     144   K 3.2*  3.2*   CO2 19*  19*   CL 98  98   BUN 12  12   CREATININE 1.0  1.0   ALKPHOS 158*   ALT 46*   AST 92*   BILITOT 3.1*     Lab Results   Component Value Date    WBC 3.29 (L) 03/21/2018    HGB 7.1 (L) 03/21/2018    HCT 21.9 (L) 03/21/2018    MCV 89 03/21/2018    PLT 69 (L) 03/21/2018     No results for input(s): TSH, FREET4 in the last 168 hours.  Lab Results   Component Value Date    HGBA1C 6.9 (H) 02/11/2018       Nutritional status:   Body mass index is 26.35 kg/m².  Lab Results   Component Value Date    ALBUMIN 2.6 (L) 03/21/2018    ALBUMIN 2.6 (L) 03/21/2018    ALBUMIN 2.6 (L) 03/20/2018     Lab Results   Component Value Date    PREALBUMIN 5 (L) 03/15/2018    PREALBUMIN 18 (L) 03/24/2015    PREALBUMIN 19 (L) 12/17/2014       Estimated Creatinine Clearance: 88.1 mL/min (based on SCr of 1 mg/dL).    Accu-Checks  Recent Labs      03/20/18   1441  03/20/18   1616  03/20/18   1838  03/20/18   1956  03/20/18   2301  03/20/18   2303  03/21/18   0151  03/21/18   0348  03/21/18   0546  03/21/18   0752   POCTGLUCOSE  161*  157*  148*  162*  211*  183*  183*  166*  159*  132*       Current Medications and/or Treatments Impacting Glycemic Control  Immunotherapy:  Immunosuppressants     None        Steroids:   Hormones     Start     Stop Route Frequency Ordered    03/17/18 1530  vasopressin (PITRESSIN) 0.2 Units/mL in dextrose 5 % 100 mL infusion      -- IV Continuous 03/17/18 1424    03/15/18 1400  hydrocortisone sodium succinate injection 100 mg      -- IV Every 8 hours 03/15/18 1224        Pressors:    Autonomic Drugs     Start     Stop Route Frequency Ordered    03/14/18 1045  norepinephrine 16 mg in dextrose 5 % 250 mL infusion     Question Answer Comment   Titrate by: (in mcg/kg/min) 0.02    Titrate interval: (in minutes) 2    Titrate to maintain: (MAP or SBP) MAP    Greater than: (in mmHg) 60    Maximum dose: (in mcg/kg/min) 3        -- IV Continuous  03/14/18 0937        Hyperglycemia/Diabetes Medications: Antihyperglycemics     Start     Stop Route Frequency Ordered    03/14/18 1630  insulin regular (Humulin R) 100 Units in sodium chloride 0.9% 100 mL infusion     Question:  Insulin Rate Adjustment (DO NOT MODIFY ANSWER)  Answer:  [email or web address]    -- IV Continuous 03/14/18 1530          ASSESSMENT and PLAN    * Acute respiratory failure with hypoxia    Per primary        Type 1 diabetes mellitus with hyperglycemia    BG goal 140-180  Continue intensive insulin gtt q2hr checks while remains intubated.   Once stable will switch to transition.   Do not suspend gtt >1 hr, pt is T1DM.    Low c peptide with glc 164. Treat as type 1.   Neg MODE 2013, neg islet cell ab on this admission.   Cannot order insulin ab while on insulin, and ZnT8 unavailable in labs.            On enteral nutrition     May adversely impact bg                    Hypothyroidism due to Hashimoto's thyroiditis     Abnormal TFTs upon admit.  Unclear if secondary to illness, but with evidence of iatrogenic hyperthyroidism in past while on above weight based dose.      Continue LT4 ng 125mcg daily (decreased from 150mcg)  Repeat TFTs in 4 weeks (due ~4/15)             Acute renal failure superimposed on stage 4 chronic kidney disease     Avoid insulin stacking             Heart transplanted     Per transplant  Avoid hypoglycemia             Current use of steroid medication     Chronic steroids PDN 2.5mg o/p.   Agree with stress dose steroids during critical illness              Palmira Dorsey MD  Endocrinology  [facility]

## 2018-03-21 NOTE — SUBJECTIVE & OBJECTIVE
Interval History:   SLED overnight with adequate metabolic clearance.  Had to be switched to a lower K bath 2/2 hyperkalemia.  Running on 40 bicarb bath this morning.  Electrolytes are stable with continued metabolic acidosis on labs this morning.  ABG revealing alkalemia with primary respiratory alkalosis.    He was weaned from levo this morning, remains on vaso.  Started on tube feeds.    Review of patient's allergies indicates:   Allergen Reactions    No known drug allergies      Current Facility-Administered Medications   Medication Frequency    0.9%  NaCl infusion (CRRT USE ONLY) Continuous    0.9%  NaCl infusion (for blood administration) Q24H PRN    0.9%  NaCl infusion (for blood administration) Q24H PRN    0.9%  NaCl infusion (for blood administration) Q24H PRN    0.9%  NaCl infusion (for blood administration) Q24H PRN    0.9%  NaCl infusion (for blood administration) Q24H PRN    chlorhexidine 0.12 % solution 15 mL BID    dexmedetomidine (PRECEDEX) 400mcg/100mL 0.9% NaCL infusion Continuous    dextrose 50% injection 12.5 g PRN    dextrose 50% injection 25 g PRN    docusate 50 mg/5 mL liquid 100 mg BID    escitalopram oxalate tablet 20 mg Daily    famotidine (PF) injection 20 mg Daily    fentaNYL 2500 mcg in 0.9% sodium chloride 250 mL infusion premix (titrating) Continuous    glucagon (human recombinant) injection 1 mg PRN    glucose chewable tablet 16 g PRN    glucose chewable tablet 24 g PRN    hydrocortisone sodium succinate injection 100 mg Q8H    insulin regular (Humulin R) 100 Units in sodium chloride 0.9% 100 mL infusion Continuous    k phos di & mono-sod phos mono 250 mg tablet 2 tablet Q4H PRN    levothyroxine tablet 125 mcg Before breakfast    norepinephrine 16 mg in dextrose 5 % 250 mL infusion Continuous    piperacillin-tazobactam 4.5 g in sodium chloride 0.9% 100 mL IVPB (ready to mix system) Q8H    polyethylene glycol packet 17 g Daily    posaconazole 200 mg/5ml (40  mg/ml) suspension 200 mg TID WM    potassium phosphate 30 mmol in dextrose 5 % 500 mL infusion Once    sodium chloride 0.9% flush 3 mL Q8H    vancomycin 1 gram/250 mL in sodium chloride 0.9% IVPB 1 g Q24H    vasopressin (PITRESSIN) 0.2 Units/mL in dextrose 5 % 100 mL infusion Continuous       Objective:     Vital Signs (Most Recent):  Temp: 98.2 °F (36.8 °C) (03/21/18 1000)  Pulse: (!) 116 (03/21/18 1115)  Resp: (!) 23 (03/20/18 1830)  BP: (!) 103/55 (03/21/18 0700)  SpO2: 100 % (03/21/18 1115)  O2 Device (Oxygen Therapy): ventilator (03/21/18 1115) Vital Signs (24h Range):  Temp:  [97.2 °F (36.2 °C)-98.4 °F (36.9 °C)] 98.2 °F (36.8 °C)  Pulse:  [] 116  Resp:  [21-28] 23  SpO2:  [98 %-100 %] 100 %  BP: ()/(52-72) 103/55  Arterial Line BP: ()/(41-67) 107/49     Weight: 76.3 kg (168 lb 3.4 oz) (03/19/18 0913)  Body mass index is 26.35 kg/m².  Body surface area is 1.9 meters squared.    I/O last 3 completed shifts:  In: 9313 [I.V.:7878; Blood:385; NG/GT:200; IV Piggyback:850]  Out: 26127 [Drains:300; Other:00086]    Physical Exam   Constitutional: He appears well-developed and well-nourished. He is sedated and intubated.   HENT:   Head: Normocephalic and atraumatic.   Neck: Neck supple. No thyromegaly present.   Cardiovascular: Normal rate and regular rhythm.    Pulmonary/Chest: He is intubated. He has no wheezes. He has no rales.   Abdominal: Soft. He exhibits no distension.   Musculoskeletal: He exhibits no edema or deformity.   Skin: Skin is warm and dry. He is not diaphoretic.       Significant Labs:  ABGs:   Recent Labs  Lab 03/21/18  0350   PH 7.433   PCO2 33.1*   HCO3 22.1*   POCSATURATED 74*   BE -2     CBC:   Recent Labs  Lab 03/21/18  0756   WBC 3.29*   RBC 2.47*   HGB 7.1*   HCT 21.9*   PLT 69*   MCV 89   MCH 28.7   MCHC 32.4     CMP:   Recent Labs  Lab 03/21/18  0350   *  166*   CALCIUM 8.5*  8.5*   ALBUMIN 2.6*  2.6*   PROT 5.7*     144   K 3.2*  3.2*   CO2 19*  19*    CL 98  98   BUN 12  12   CREATININE 1.0  1.0   ALKPHOS 158*   ALT 46*   AST 92*   BILITOT 3.1*

## 2018-03-21 NOTE — SUBJECTIVE & OBJECTIVE
Interval History: No issues overnight. Intubated/sedated. Withdraws to pain.     Continuous Infusions:   sodium chloride 0.9% 200 mL/hr at 03/20/18 1315    dexmedetomidine (PRECEDEX) infusion Stopped (03/21/18 0750)    fentanyl Stopped (03/20/18 0803)    insulin (HUMAN R) infusion (adults) 0.5 Units/hr (03/21/18 0800)    norepinephrine bitartrate-D5W 0.02 mcg/kg/min (03/21/18 0733)    vasopressin (PITRESSIN) infusion 0.04 Units/min (03/21/18 0733)     Scheduled Meds:   chlorhexidine  15 mL Mouth/Throat BID    docusate  100 mg Per NG tube BID    escitalopram oxalate  20 mg Per NG tube Daily    famotidine (PF)  20 mg Intravenous Daily    hydrocortisone sodium succinate  100 mg Intravenous Q8H    levothyroxine  125 mcg Per NG tube Before breakfast    piperacillin-tazobactam (ZOSYN) IVPB  4.5 g Intravenous Q8H    polyethylene glycol  17 g Per NG tube Daily    posaconazole 200 mg/5ml (40 mg/ml)  200 mg Per NG tube TID WM    sodium chloride 0.9%  3 mL Intravenous Q8H    vancomycin (VANCOCIN) IVPB  1,000 mg Intravenous Q24H     PRN Meds:sodium chloride, sodium chloride, sodium chloride, sodium chloride, sodium chloride, dextrose 50%, dextrose 50%, glucagon (human recombinant), glucose, glucose, k phos di & mono-sod phos mono    Review of patient's allergies indicates:   Allergen Reactions    No known drug allergies      Objective:     Vital Signs (Most Recent):  Temp: 97.7 °F (36.5 °C) (03/21/18 0800)  Pulse: 91 (03/21/18 0800)  Resp: (!) 23 (03/20/18 1830)  BP: (!) 103/55 (03/21/18 0700)  SpO2: 100 % (03/21/18 0800) Vital Signs (24h Range):  Temp:  [97.2 °F (36.2 °C)-98.4 °F (36.9 °C)] 97.7 °F (36.5 °C)  Pulse:  [] 91  Resp:  [21-28] 23  SpO2:  [98 %-100 %] 100 %  BP: ()/(49-72) 103/55  Arterial Line BP: ()/(33-67) 106/50     Patient Vitals for the past 72 hrs (Last 3 readings):   Weight   03/19/18 0913 76.3 kg (168 lb 3.4 oz)     Body mass index is 26.35 kg/m².      Intake/Output  Summary (Last 24 hours) at 03/21/18 0834  Last data filed at 03/21/18 0800   Gross per 24 hour   Intake          6142.08 ml   Output             7421 ml   Net         -1278.92 ml        Telemetry: ST  Physical Exam   Constitutional: He appears well-developed and well-nourished.   Appears ill   HENT:   Head: Normocephalic and atraumatic.   Eyes: Conjunctivae are normal. Pupils are equal, round, and reactive to light.   Neck: No thyromegaly present.   RIJ trialysis   Cardiovascular: Regular rhythm.    Pulmonary/Chest:   Intubated and ventilated  Breath sounds coarse bilaterally   Abdominal: Soft.   No bowel sounds. + ascites   Musculoskeletal:   1-2+ BUE edema; no MITZY; no cyanosis   Neurological:   Withdraws to pain   Skin: Skin is warm and dry. Capillary refill takes less than 2 seconds.       Significant Labs:  CBC:    Recent Labs  Lab 03/20/18  1610 03/20/18  2305 03/21/18  0756   WBC 7.20 4.88 3.29*   RBC 2.96* 2.75* 2.47*   HGB 8.6* 7.9* 7.1*   HCT 25.9* 23.8* 21.9*   PLT 43* 29* 69*   MCV 88 87 89   MCH 29.1 28.7 28.7   MCHC 33.2 33.2 32.4     BNP:  No results for input(s): BNP in the last 168 hours.    Invalid input(s): BNPTRIAGELBLO  CMP:    Recent Labs  Lab 03/19/18  0247  03/20/18  0438 03/20/18  1332 03/20/18  2055 03/21/18  0350   *  125*  < > 153*  153* 176* 169* 166*  166*   CALCIUM 7.8*  7.8*  < > 8.3*  8.3* 8.5* 8.3* 8.5*  8.5*   ALBUMIN 2.1*  2.1*  < > 2.6*  2.6* 2.8* 2.6* 2.6*  2.6*   PROT 5.0*  --  5.9*  --   --  5.7*     144  < > 143  143 143 143 144  144   K 3.6  3.6  < > 4.8  4.8 5.5* 4.5 3.2*  3.2*   CO2 24  24  < > 18*  18* 17* 18* 19*  19*     104  < > 105  105 102 98 98  98   BUN 8  8  < > 10  10 12 10 12  12   CREATININE 0.8  0.8  < > 0.9  0.9 0.9 0.9 1.0  1.0   ALKPHOS 155*  --  165*  --   --  158*   ALT 42  --  51*  --   --  46*   *  --  124*  --   --  92*   BILITOT 2.1*  --  2.6*  --   --  3.1*   < > = values in this interval not  displayed.   Coagulation:     Recent Labs  Lab 03/20/18  0821 03/20/18  1610 03/20/18  2305   INR 1.5* 1.7* 1.7*     LDH:    Recent Labs  Lab 03/18/18  2222   *     Microbiology:  Microbiology Results (last 7 days)     Procedure Component Value Units Date/Time    Blood culture [[id number]] Collected:  03/21/18 0815    Order Status:  Sent Specimen:  Blood from Peripheral, Forearm, Left Updated:  03/21/18 0826    Blood culture [[id number]] Collected:  03/18/18 0618    Order Status:  Completed Specimen:  Blood from Peripheral, Hand, Left Updated:  03/20/18 1149     Blood Culture, Routine Gram stain aer bottle: Gram positive cocci in clusters resembling Staph      Blood Culture, Routine Results called to and read back by:Omid Cody RN 03/19/2018  10:55     Blood Culture, Routine --     COAGULASE-NEGATIVE STAPHYLOCOCCUS SPECIES  Organism is a probable contaminant      Blood culture [[id number]] Collected:  03/18/18 0748    Order Status:  Completed Specimen:  Blood from Peripheral, Hand, Right Updated:  03/20/18 1013     Blood Culture, Routine No Growth to date     Blood Culture, Routine No Growth to date     Blood Culture, Routine No Growth to date    Blood culture [[id number]] Collected:  03/13/18 1637    Order Status:  Completed Specimen:  Blood from Peripheral, Antecubital, Left Updated:  03/18/18 2012     Blood Culture, Routine No growth after 5 days.    Blood culture [[id number]] Collected:  03/13/18 1701    Order Status:  Completed Specimen:  Blood from Peripheral, Forearm, Left Updated:  03/18/18 2012     Blood Culture, Routine No growth after 5 days.    Blood culture [[id number]]     Order Status:  Canceled Specimen:  Blood     Blood culture [[id number]]     Order Status:  Canceled Specimen:  Blood     Culture, Respiratory with Gram Stain [[id number]] Collected:  03/15/18 1541    Order Status:  Completed Specimen:  Respiratory from Sputum Updated:  03/17/18 0914     Respiratory Culture No growth     Gram Stain  (Respiratory) <10 epithelial cells per low power field.     Gram Stain (Respiratory) Rare WBC's      Gram Stain (Respiratory) No organisms seen    Culture, Respiratory with Gram Stain [[id number]] Collected:  03/14/18 1137    Order Status:  Completed Specimen:  Respiratory from BAL, TRIPP Updated:  03/16/18 1046     Respiratory Culture No growth     Gram Stain (Respiratory) No WBC's     Gram Stain (Respiratory) No organisms seen    AFB Culture & Smear [[id number]] Collected:  03/14/18 1137    Order Status:  Completed Specimen:  Bronchial Wash from Amniotic Fluid Updated:  03/15/18 2127     AFB Culture & Smear Culture in progress     AFB CULTURE STAIN No acid fast bacilli seen.    Respiratory Viral Panel by PCR [facility]; Nasal Swab [[id number]]  (Abnormal) Collected:  03/13/18 1733    Order Status:  Completed Specimen:  Respiratory Updated:  03/15/18 1302     Respiratory Virus Panel, source Nasal Swab     RVP - Adenovirus Not Detected     Comment: Detects Serotypes B and E. Detection of Serotype C may   be limited. If Adenovirus infection is suspected and a   Not Detected result is returned the sample should be   re-tested for Adenovirus using an independent method  (e.g. [employer] Adenovirus Quantitative Real-Time  PCR test.          Enterovirus Not Detected     Comment: Cross-reactivity has been observed between certain Rhinovirus  strains and the Enterovirus assay.          Human Bocavirus Not Detected     Human Coronavirus Not Detected     Comment: The Human Coronavirus assay detects Human coronavirus types  229E, OC43,NL63 and HKU1.          RVP - Human Metapneumovirus (hMPV) Not Detected     RVP - Influenza A Not Detected     Influenza A - A3B6-23 Not Detected     RVP - Influenza B Not Detected     Parainfluenza Not Detected     Respiratory Syncytial VirusVirus (RSV) A Positive (A)     Comment: The Respiratory Syncytial Viral assay detects types A and B,  however it does not distinguish between the two.           RVP - Rhinovirus Not Detected     Comment: Cross-Reactivity has been observed between certain   Rhinovirus strains and the Enterovirus assay.  Target Enriched Mulitplex Polymerase Chain Reaction (TEM-PCR)  allows for the detection of multiple pathogens out of a single  reaction.  This test was developed and its performance   characteristics determined by [employer].  It has not   been cleared or approved by the U.S.Food and Drug Administration.  Results should be used in conjunction with clinical findings,   and should not form the sole basis for a diagnosis or treatment  decision.  TEM-PCR is a licensed technology of [employer]         Narrative:       Receiving Lab:->[facility]    Culture, Respiratory with Gram Stain [[id number]] Collected:  03/13/18 1441    Order Status:  Completed Specimen:  Respiratory from Sputum, Expectorated Updated:  03/15/18 1237     Respiratory Culture Normal respiratory hank     Gram Stain (Respiratory) <10 epithelial cells per low power field.     Gram Stain (Respiratory) Rare WBC's     Gram Stain (Respiratory) Moderate Gram positive cocci     Gram Stain (Respiratory) Many Gram negative rods    Fungus culture [[id number]] Collected:  03/14/18 1137    Order Status:  Completed Specimen:  Bronchial Wash from Amniotic Fluid Updated:  03/15/18 1124     Fungus (Mycology) Culture Culture in progress    Stool culture **CANNOT BE ORDERED STAT** [[id number]] Collected:  03/11/18 1923    Order Status:  Completed Specimen:  Stool from Stool Updated:  03/14/18 1549     Stool Culture No enteric hank     Stool Culture No Salmonella,Shigella,Vibrio,Campylobacter,Yersinia isolated.    Culture, Fluid  (Aerobic) [[id number]] Collected:  03/14/18 1137    Order Status:  Canceled Specimen:  Bronchial Wash from Amniotic Fluid Updated:  03/14/18 1416    Cryptococcal antigen [[id number]] Collected:  03/14/18 0425    Order Status:  Completed Specimen:  Blood from Blood Updated:  03/14/18  1217     Cryptococcal Ag, Blood Negative          I have reviewed all pertinent labs within the past 24 hours.    Estimated Creatinine Clearance: 88.1 mL/min (based on SCr of 1 mg/dL).    Diagnostic Results:  I have reviewed all pertinent imaging results/findings within the past 24 hours.

## 2018-03-21 NOTE — ASSESSMENT & PLAN NOTE
-Continue Levo for MAP 60. Continue Vaso 0.04u/hr.  -Continue stress dose hydrocortisone 100mg TID.  -Appreciate ID assistance. Abx adjustments as above.  -Will need to change out lines soon.

## 2018-03-21 NOTE — ASSESSMENT & PLAN NOTE
- baseline sCr 2.6-3.0 consistent with CKD stage IV  - anuric CACHORRO; likely ischemic ATN from prolonged pre-renal state (diarrhea) in setting of prograf renal vasoconstriction; cannot r/o septic ATN or Prograf toxicity  - SLED started 3/14  - remains anuric  - net negative 1.3L yesterday. Minimal vent requirements. Hourly intake ~40cc/hr  -HAGMA with elevated beta-hydroxybutyrate.  On tube feeds and insulin gtt.    Plan:  Will continue SLED for metabolic clearance/volume management.  Decrease bicarb bath to 35  4K bath  UF goal of 300-350 cc/hr  K Phos 30 MMOL IV x 1 now.

## 2018-03-21 NOTE — PROGRESS NOTES
[facility]  Infectious Disease  Progress Note    Patient Name: Lv Crocker  MRN: [id number]  Admission Date: 3/11/2018  Length of Stay: 9 days  Attending Physician: Jose A Lloyd MD  Primary Care Provider: Philippe Mohr MD    Isolation Status: Contact  Assessment/Plan:      * Acute respiratory failure with hypoxia    41yo man w/a history of DM1, Hashimoto's thyroiditis, and ICM (s/p OHT 11/18/2014, CMV D+/R+, steroid induction, on maintenance tacro/MMF/pred; c/b early hemorrhagic tamponade requiring washout, delayed closure, and pericardial window and subsequent AF w/RVR, and CACHORRO; late course c/b ABMR 4/2015 s/p rituxan, PLEX, and IVIG; subsequent C.diff/CMV reactivation, restrictive cardiomyopathy with recurrent pleural effusions s/p VATS/pleurex catheter 1/2018 with removal 2/2018 and ascites requiring repeated LVP, and CKD) who was admitted on 3/11/2018 with ~3wks of worsening profuse non-bloody diarrhea, associated cramping abdominal pain, N/V, and a week of acute on chronic cough, SOB, hypoxia requiring intubation, and fevers and was found to have multifocal pneumonia on CT chest due to RSV-A pneumonia with suspected superimposed bacterial pneumonia. He remains critically ill with a course c/b ongoing pressor requirement, hypoxic RF, CRRT requirement, and possible evolving DIC (of note, suspect CONS in blood cx is contaminant but will prove).     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 I will follow-up with patient. Please contact us if you have any additional questions.    Lawrence Dillard MD  Infectious Disease Fellow, PGY-5  Spectra: [phone]  Pager: [phone]  [facility]    Subjective:     Principal Problem:Acute respiratory failure with hypoxia    HPI: 41yo man w/a history of DM1, Hashimoto's thyroiditis, and ICM (s/p OHT 11/18/2014, CMV D+/R+, steroid induction, on maintenance tacro/MMF/pred; c/b early hemorrhagic tamponade requiring washout, delayed closure, and pericardial window and subsequent AF w/RVR, and CACHORRO; late course c/b ABMR 4/2015 s/p rituxan, PLEX, and IVIG; subsequent C.diff/CMV reactivation, restrictive cardiomyopathy with recurrent pleural effusions s/p VATS/pleurex catheter 1/2018 with removal 2/2018 and ascites requiring repeated LVP, and CKD) who was admitted on 3/11/2018 with ~3wks of worsening profuse non-bloody diarrhea, associated cramping abdominal pain, N/V, and a week of acute on chronic cough, SOB, hypoxia requiring intubation, and fevers and was found to have multifocal pneumonia on CT chest due to RSV-A pneumonia with suspected superimposed bacterial pneumonia. He remains critically ill with ongoing pressor requirement, hypoxic RF, CRRT requirement, and minimal hepatitis.  Interval History: patient been wean off ventilator, continues on minimal ventilatory settings, vasopressors been wean off    Review of Systems   Unable to perform ROS: Intubated     Objective:     Vital Signs (Most Recent):  Temp: 97.7 °F (36.5 °C) (03/21/18 1500)  Pulse: (!) 121 (03/21/18 1500)  Resp: (!) 23 (03/20/18 1830)  BP: 106/62 (03/21/18 1500)  SpO2: 100 % (03/21/18 1500) Vital Signs (24h Range):  Temp:  [97.2 °F (36.2  °C)-98.2 °F (36.8 °C)] 97.7 °F (36.5 °C)  Pulse:  [] 121  Resp:  [23-28] 23  SpO2:  [98 %-100 %] 100 %  BP: (102-136)/(55-72) 106/62  Arterial Line BP: (106-152)/(42-67) 111/51     Weight: 76.3 kg (168 lb 3.4 oz)  Body mass index is 26.35 kg/m².    Estimated Creatinine Clearance: 110.2 mL/min (based on SCr of 0.8 mg/dL).    Physical Exam   Constitutional: He is sedated and intubated.   Eyes: No scleral icterus.   Cardiovascular: Normal rate and regular rhythm.    Pulmonary/Chest: He is intubated. He has no wheezes. He has no rales.   Abdominal: Soft. He exhibits no distension and no mass. There is no rebound.   Musculoskeletal: He exhibits no edema.   Lymphadenopathy:     He has no cervical adenopathy.   Skin: No rash noted. No erythema.       Significant Labs:   Bilirubin:   Recent Labs  Lab 03/13/18  2334  03/17/18  2231 03/18/18  0411 03/19/18  0247 03/20/18  0438 03/21/18  0350   BILIDIR 0.9*  --   --   --   --   --   --    BILITOT 1.1*  < > 2.1* 2.1* 2.1* 2.6* 3.1*   < > = values in this interval not displayed.  Blood Culture:   Recent Labs  Lab 02/11/18  1115 03/13/18  1637 03/13/18  1701 03/18/18  0618 03/18/18  0748   LABBLOO No growth after 5 days. No growth after 5 days. No growth after 5 days. Gram stain aer bottle: Gram positive cocci in clusters resembling Staph   Results called to and read back by:Omid Cody RN 03/19/2018  10:55  COAGULASE-NEGATIVE STAPHYLOCOCCUS SPECIESOrganism is a probable contaminant No Growth to date  No Growth to date  No Growth to date  No Growth to date     BMP:   Recent Labs  Lab 03/21/18  1351   *      K 4.2   CL 96   CO2 26   BUN 7   CREATININE 0.8   CALCIUM 8.0*   MG 1.6     CBC:   Recent Labs  Lab 03/20/18  2305 03/21/18  0756 03/21/18  1508   WBC 4.88 3.29* 3.64*   HGB 7.9* 7.1* 7.4*   HCT 23.8* 21.9* 22.3*   PLT 29* 69* 61*     CMP:   Recent Labs  Lab 03/20/18  0438  03/20/18  2055 03/21/18  0350 03/21/18  1351     143  < > 143 144  144  144   K 4.8  4.8  < > 4.5 3.2*  3.2* 4.2     105  < > 98 98  98 96   CO2 18*  18*  < > 18* 19*  19* 26   *  153*  < > 169* 166*  166* 165*   BUN 10  10  < > 10 12  12 7   CREATININE 0.9  0.9  < > 0.9 1.0  1.0 0.8   CALCIUM 8.3*  8.3*  < > 8.3* 8.5*  8.5* 8.0*   PROT 5.9*  --   --  5.7*  --    ALBUMIN 2.6*  2.6*  < > 2.6* 2.6*  2.6* 2.7*   BILITOT 2.6*  --   --  3.1*  --    ALKPHOS 165*  --   --  158*  --    *  --   --  92*  --    ALT 51*  --   --  46*  --    ANIONGAP 20*  20*  < > 27* 27*  27* 22*   EGFRNONAA >60.0  >60.0  < > >60.0 >60.0  >60.0 >60.0   < > = values in this interval not displayed.  Microbiology Results (last 7 days)     Procedure Component Value Units Date/Time    Blood culture [[id number]] Collected:  03/18/18 0618    Order Status:  Completed Specimen:  Blood from Peripheral, Hand, Left Updated:  03/21/18 1108     Blood Culture, Routine Gram stain aer bottle: Gram positive cocci in clusters resembling Staph      Blood Culture, Routine Results called to and read back by:Omid Cody RN 03/19/2018  10:55     Blood Culture, Routine --     COAGULASE-NEGATIVE STAPHYLOCOCCUS SPECIES  Organism is a probable contaminant      Blood culture [[id number]] Collected:  03/18/18 0748    Order Status:  Completed Specimen:  Blood from Peripheral, Hand, Right Updated:  03/21/18 1012     Blood Culture, Routine No Growth to date     Blood Culture, Routine No Growth to date     Blood Culture, Routine No Growth to date     Blood Culture, Routine No Growth to date    Blood culture [[id number]] Collected:  03/21/18 0815    Order Status:  Sent Specimen:  Blood from Peripheral, Forearm, Left Updated:  03/21/18 0838    Blood culture [[id number]] Collected:  03/13/18 1637    Order Status:  Completed Specimen:  Blood from Peripheral, Antecubital, Left Updated:  03/18/18 2012     Blood Culture, Routine No growth after 5 days.    Blood culture [[id number]] Collected:  03/13/18 1701    Order  Status:  Completed Specimen:  Blood from Peripheral, Forearm, Left Updated:  03/18/18 2012     Blood Culture, Routine No growth after 5 days.    Blood culture [[id number]]     Order Status:  Canceled Specimen:  Blood     Blood culture [[id number]]     Order Status:  Canceled Specimen:  Blood     Culture, Respiratory with Gram Stain [[id number]] Collected:  03/15/18 1541    Order Status:  Completed Specimen:  Respiratory from Sputum Updated:  03/17/18 0914     Respiratory Culture No growth     Gram Stain (Respiratory) <10 epithelial cells per low power field.     Gram Stain (Respiratory) Rare WBC's      Gram Stain (Respiratory) No organisms seen    Culture, Respiratory with Gram Stain [[id number]] Collected:  03/14/18 1137    Order Status:  Completed Specimen:  Respiratory from BAL, TRIPP Updated:  03/16/18 1046     Respiratory Culture No growth     Gram Stain (Respiratory) No WBC's     Gram Stain (Respiratory) No organisms seen    AFB Culture & Smear [[id number]] Collected:  03/14/18 1137    Order Status:  Completed Specimen:  Bronchial Wash from Amniotic Fluid Updated:  03/15/18 2127     AFB Culture & Smear Culture in progress     AFB CULTURE STAIN No acid fast bacilli seen.    Respiratory Viral Panel by PCR [facility]; Nasal Swab [[id number]]  (Abnormal) Collected:  03/13/18 1733    Order Status:  Completed Specimen:  Respiratory Updated:  03/15/18 1302     Respiratory Virus Panel, source Nasal Swab     RVP - Adenovirus Not Detected     Comment: Detects Serotypes B and E. Detection of Serotype C may   be limited. If Adenovirus infection is suspected and a   Not Detected result is returned the sample should be   re-tested for Adenovirus using an independent method  (e.g. [employer] Adenovirus Quantitative Real-Time  PCR test.          Enterovirus Not Detected     Comment: Cross-reactivity has been observed between certain Rhinovirus  strains and the Enterovirus assay.          Human Bocavirus Not Detected     Human  Coronavirus Not Detected     Comment: The Human Coronavirus assay detects Human coronavirus types  229E, OC43,NL63 and HKU1.          RVP - Human Metapneumovirus (hMPV) Not Detected     RVP - Influenza A Not Detected     Influenza A - S3U9-96 Not Detected     RVP - Influenza B Not Detected     Parainfluenza Not Detected     Respiratory Syncytial VirusVirus (RSV) A Positive (A)     Comment: The Respiratory Syncytial Viral assay detects types A and B,  however it does not distinguish between the two.          RVP - Rhinovirus Not Detected     Comment: Cross-Reactivity has been observed between certain   Rhinovirus strains and the Enterovirus assay.  Target Enriched Mulitplex Polymerase Chain Reaction (TEM-PCR)  allows for the detection of multiple pathogens out of a single  reaction.  This test was developed and its performance   characteristics determined by [employer].  It has not   been cleared or approved by the U.S.Food and Drug Administration.  Results should be used in conjunction with clinical findings,   and should not form the sole basis for a diagnosis or treatment  decision.  TEM-PCR is a licensed technology of [employer].         Narrative:       Receiving Lab:->[facility]    Culture, Respiratory with Gram Stain [[id number]] Collected:  03/13/18 1441    Order Status:  Completed Specimen:  Respiratory from Sputum, Expectorated Updated:  03/15/18 1237     Respiratory Culture Normal respiratory hank     Gram Stain (Respiratory) <10 epithelial cells per low power field.     Gram Stain (Respiratory) Rare WBC's     Gram Stain (Respiratory) Moderate Gram positive cocci     Gram Stain (Respiratory) Many Gram negative rods    Fungus culture [[id number]] Collected:  03/14/18 1137    Order Status:  Completed Specimen:  Bronchial Wash from Amniotic Fluid Updated:  03/15/18 1124     Fungus (Mycology) Culture Culture in progress          Significant Imaging: I have reviewed all pertinent imaging  results/findings within the past 24 hours.

## 2018-03-21 NOTE — PROGRESS NOTES
Wound care follow-up:  The family/MD are discussing care at bedside. Unit nurse reports no skin breakdown, no redness.  Sacral foam dressing applied as preventive.  Continue care.

## 2018-03-21 NOTE — PROGRESS NOTES
[facility]  Nephrology  Progress Note    Patient Name: Lv Crocker  MRN: [id number]  Admission Date: 3/11/2018  Hospital Length of Stay: 9 days  Attending Provider: Jose A Lloyd MD   Primary Care Physician: Philippe Mohr MD  Principal Problem:Acute respiratory failure with hypoxia    Subjective:     HPI: Mr. Crocker is a 43 yo WM with T1DM, Hashimoto thyroiditis, h/o OHTx 11/2014, and CKD stage 4 who was admitted on 3/11/18 for persistent diarrhea. He reported a 3 week history of diarrhea up to 15x/day. Associated symptoms including URI symptoms, coughing fits, and coughing syncope. Prograf level was 14.3. His post-heart transplant course has been complicated by AMR 4/2015, CMV, post-transplant restrictive cardiomyopathy, recurrent pleural effusions/ascites requiring monthly thoracenteses/paracenteses, VATS 1/11/18 with Pleur-X catheter (now removed), and CKD stage 4 with baseline sCr 2.6-3.0. Baseline -120s and baseline BP 90s-110s/60-70s. Upon admission he was started on IVF and diarrhea workup was initiated. On 3/12 he was noted to have decreased UOP despite fluids; NS was increased to 100cc/hr. He was scheduled for a colonoscopy on 3/13 however procedure was cancelled due to hypoxia and fever. He was transferred to the ICU for closer monitoring. He continued to have oliguria overnight. Bladder scan revealed 200cc of urine but patient refused osullivan catheter placement. Wife reports that patient always has a hard time urinating again after osullivan catheters are placed/removed so he refuses them. He remained hypoxic despite FiO2 70% and ABG revealed combined respiratory and metabolic acidosis with pH 7.17. He was started on BiPAP as well as a bicarb infusion at 50cc/hr. ABG with mild improvement. AM labs revealed K 6.2 and he was shifted and given kayexalate.Trialysis catheter was placed this morning and he subsequently developed hypotension and was started on levophed. He was  intubated later this morning. Nephrology consulted for CACHORRO. All history obtained by primary team and chart review as patient was intubated/sedated on exam. Consent for dialysis obtained by patient's wife and placed in chart. Of note, both of patient's parents were on dialysis.     Interval History:   SLED overnight with adequate metabolic clearance.  Had to be switched to a lower K bath 2/2 hyperkalemia.  Running on 40 bicarb bath this morning.  Electrolytes are stable with continued metabolic acidosis on labs this morning.  ABG revealing alkalemia with primary respiratory alkalosis.    He was weaned from levo this morning, remains on vaso.  Started on tube feeds.    Review of patient's allergies indicates:   Allergen Reactions    No known drug allergies      Current Facility-Administered Medications   Medication Frequency    0.9%  NaCl infusion (CRRT USE ONLY) Continuous    0.9%  NaCl infusion (for blood administration) Q24H PRN    0.9%  NaCl infusion (for blood administration) Q24H PRN    0.9%  NaCl infusion (for blood administration) Q24H PRN    0.9%  NaCl infusion (for blood administration) Q24H PRN    0.9%  NaCl infusion (for blood administration) Q24H PRN    chlorhexidine 0.12 % solution 15 mL BID    dexmedetomidine (PRECEDEX) 400mcg/100mL 0.9% NaCL infusion Continuous    dextrose 50% injection 12.5 g PRN    dextrose 50% injection 25 g PRN    docusate 50 mg/5 mL liquid 100 mg BID    escitalopram oxalate tablet 20 mg Daily    famotidine (PF) injection 20 mg Daily    fentaNYL 2500 mcg in 0.9% sodium chloride 250 mL infusion premix (titrating) Continuous    glucagon (human recombinant) injection 1 mg PRN    glucose chewable tablet 16 g PRN    glucose chewable tablet 24 g PRN    hydrocortisone sodium succinate injection 100 mg Q8H    insulin regular (Humulin R) 100 Units in sodium chloride 0.9% 100 mL infusion Continuous    k phos di & mono-sod phos mono 250 mg tablet 2 tablet Q4H PRN     levothyroxine tablet 125 mcg Before breakfast    norepinephrine 16 mg in dextrose 5 % 250 mL infusion Continuous    piperacillin-tazobactam 4.5 g in sodium chloride 0.9% 100 mL IVPB (ready to mix system) Q8H    polyethylene glycol packet 17 g Daily    posaconazole 200 mg/5ml (40 mg/ml) suspension 200 mg TID WM    potassium phosphate 30 mmol in dextrose 5 % 500 mL infusion Once    sodium chloride 0.9% flush 3 mL Q8H    vancomycin 1 gram/250 mL in sodium chloride 0.9% IVPB 1 g Q24H    vasopressin (PITRESSIN) 0.2 Units/mL in dextrose 5 % 100 mL infusion Continuous       Objective:     Vital Signs (Most Recent):  Temp: 98.2 °F (36.8 °C) (03/21/18 1000)  Pulse: (!) 116 (03/21/18 1115)  Resp: (!) 23 (03/20/18 1830)  BP: (!) 103/55 (03/21/18 0700)  SpO2: 100 % (03/21/18 1115)  O2 Device (Oxygen Therapy): ventilator (03/21/18 1115) Vital Signs (24h Range):  Temp:  [97.2 °F (36.2 °C)-98.4 °F (36.9 °C)] 98.2 °F (36.8 °C)  Pulse:  [] 116  Resp:  [21-28] 23  SpO2:  [98 %-100 %] 100 %  BP: ()/(52-72) 103/55  Arterial Line BP: ()/(41-67) 107/49     Weight: 76.3 kg (168 lb 3.4 oz) (03/19/18 0913)  Body mass index is 26.35 kg/m².  Body surface area is 1.9 meters squared.    I/O last 3 completed shifts:  In: 9313 [I.V.:7878; Blood:385; NG/GT:200; IV Piggyback:850]  Out: 47947 [Drains:300; Other:40902]    Physical Exam   Constitutional: He appears well-developed and well-nourished. He is sedated and intubated.   HENT:   Head: Normocephalic and atraumatic.   Neck: Neck supple. No thyromegaly present.   Cardiovascular: Normal rate and regular rhythm.    Pulmonary/Chest: He is intubated. He has no wheezes. He has no rales.   Abdominal: Soft. He exhibits no distension.   Musculoskeletal: He exhibits no edema or deformity.   Skin: Skin is warm and dry. He is not diaphoretic.       Significant Labs:  ABGs:   Recent Labs  Lab 03/21/18  0350   PH 7.433   PCO2 33.1*   HCO3 22.1*   POCSATURATED 74*   BE -2     CBC:    Recent Labs  Lab 03/21/18  0756   WBC 3.29*   RBC 2.47*   HGB 7.1*   HCT 21.9*   PLT 69*   MCV 89   MCH 28.7   MCHC 32.4     CMP:   Recent Labs  Lab 03/21/18  0350   *  166*   CALCIUM 8.5*  8.5*   ALBUMIN 2.6*  2.6*   PROT 5.7*     144   K 3.2*  3.2*   CO2 19*  19*   CL 98  98   BUN 12  12   CREATININE 1.0  1.0   ALKPHOS 158*   ALT 46*   AST 92*   BILITOT 3.1*            Assessment/Plan:     Acute renal failure superimposed on stage 4 chronic kidney disease    - baseline sCr 2.6-3.0 consistent with CKD stage IV  - anuric CACHORRO; likely ischemic ATN from prolonged pre-renal state (diarrhea) in setting of prograf renal vasoconstriction; cannot r/o septic ATN or Prograf toxicity  - SLED started 3/14  - remains anuric  - net negative 1.3L yesterday. Minimal vent requirements. Hourly intake ~40cc/hr  -HAGMA with elevated beta-hydroxybutyrate.  On tube feeds and insulin gtt.    Plan:  Will continue SLED for metabolic clearance/volume management.  Decrease bicarb bath to 35  4K bath  UF goal of 300-350 cc/hr  K Phos 30 MMOL IV x 1 now.            Juaquin Ibrahim NP  Nephrology  [facility]  Pager:  [phone]

## 2018-03-22 NOTE — ASSESSMENT & PLAN NOTE
-Levo off since yesterday am. Will stop Vaso this morning.  -Continue stress dose hydrocortisone 100mg TID.  -Appreciate ID assistance. Abx adjustments as above.  -Will need to change out lines soon.

## 2018-03-22 NOTE — ASSESSMENT & PLAN NOTE
- baseline sCr 2.6-3.0 consistent with CKD stage IV  - anuric CACHORRO; likely ischemic ATN from prolonged pre-renal state (diarrhea) in setting of prograf renal vasoconstriction; cannot r/o septic ATN or Prograf toxicity  - SLED started 3/14  - remains anuric  - net negative 1.3L yesterday (negative 2.6L/48 hours). Minimal vent requirements. Hourly intake only 15cc/hr  - HAGMA due to elevated beta-hydroxybutyrate/starvation ketoacidosis. On tube feeds and insulin. Metabolic acidosis improved with high bicarb bath  - pH 7.5 this morning. Will decrease bicarb bath down to standard bath  - continue SLED today; -300cc/hr

## 2018-03-22 NOTE — SUBJECTIVE & OBJECTIVE
Interval History: patient was off vasopressors, continues on mechanical ventilation; Head CT negative (done because has no wake up yet), continues on CRRT    Review of Systems   Unable to perform ROS: Intubated     Objective:     Vital Signs (Most Recent):  Temp: 98.6 °F (37 °C) (03/22/18 1500)  Pulse: (!) 120 (03/22/18 1500)  Resp: (!) 28 (03/22/18 1430)  BP: 139/79 (03/22/18 0730)  SpO2: 100 % (03/22/18 1500) Vital Signs (24h Range):  Temp:  [97.5 °F (36.4 °C)-98.9 °F (37.2 °C)] 98.6 °F (37 °C)  Pulse:  [104-123] 120  Resp:  [22-31] 28  SpO2:  [95 %-100 %] 100 %  BP: (139)/(79) 139/79  Arterial Line BP: (111-148)/(53-71) 132/63     Weight: 71.5 kg (157 lb 10.1 oz)  Body mass index is 24.69 kg/m².    Estimated Creatinine Clearance: 110.2 mL/min (based on SCr of 0.8 mg/dL).    Physical Exam   Constitutional: He is intubated.   Eyes: No scleral icterus.   Cardiovascular: Normal rate and regular rhythm.    Pulmonary/Chest: He is intubated. He has no wheezes. He has no rales.   Abdominal: Soft. He exhibits no distension and no mass. There is no rebound.   Musculoskeletal: He exhibits no edema.   Lymphadenopathy:     He has no cervical adenopathy.   Skin: No rash noted. No erythema.       Significant Labs:   Bilirubin:   Recent Labs  Lab 03/13/18  2334  03/18/18  0411 03/19/18  0247 03/20/18  0438 03/21/18  0350 03/22/18  0358   BILIDIR 0.9*  --   --   --   --   --   --    BILITOT 1.1*  < > 2.1* 2.1* 2.6* 3.1* 2.4*   < > = values in this interval not displayed.  Blood Culture:     Recent Labs  Lab 03/13/18  1637 03/13/18  1701 03/18/18  0618 03/18/18  0748 03/21/18  0815   LABBLOO No growth after 5 days. No growth after 5 days. Gram stain aer bottle: Gram positive cocci in clusters resembling Staph   Results called to and read back by:Omid Cody RN 03/19/2018  10:55  COAGULASE-NEGATIVE STAPHYLOCOCCUS SPECIESOrganism is a probable contaminant No Growth to date  No Growth to date  No Growth to date  No Growth to  date  No Growth to date No Growth to date  No Growth to date     BMP:     Recent Labs  Lab 03/22/18  0358 03/22/18  1358   *  158* 190*     144 142   K 3.8  3.8 4.0     101 102   CO2 24  24 20*   BUN 9  9 10   CREATININE 0.8  0.8 0.8   CALCIUM 8.1*  8.1* 8.5*   MG 1.9  1.9  --      CBC:     Recent Labs  Lab 03/21/18  1508 03/21/18  2343 03/22/18  0945   WBC 3.64* 3.87* 4.80   HGB 7.4* 7.1* 7.2*   HCT 22.3* 22.3* 22.4*   PLT 61* 78* 85*     CMP:   Recent Labs  Lab 03/21/18  0350  03/21/18  2148 03/22/18  0358 03/22/18  1358     144  < > 143 144  144 142   K 3.2*  3.2*  < > 3.8 3.8  3.8 4.0   CL 98  98  < > 99 101  101 102   CO2 19*  19*  < > 21* 24  24 20*   *  166*  < > 182* 158*  158* 190*   BUN 12  12  < > 11 9  9 10   CREATININE 1.0  1.0  < > 1.0 0.8  0.8 0.8   CALCIUM 8.5*  8.5*  < > 8.3* 8.1*  8.1* 8.5*   PROT 5.7*  --   --  6.2  --    ALBUMIN 2.6*  2.6*  < > 2.7* 2.8*  2.8* 2.8*   BILITOT 3.1*  --   --  2.4*  --    ALKPHOS 158*  --   --  182*  --    AST 92*  --   --  76*  --    ALT 46*  --   --  43  --    ANIONGAP 27*  27*  < > 23* 19*  19* 20*   EGFRNONAA >60.0  >60.0  < > >60.0 >60.0  >60.0 >60.0   < > = values in this interval not displayed.  Microbiology Results (last 7 days)     Procedure Component Value Units Date/Time    Blood culture [[id number]] Collected:  03/21/18 0815    Order Status:  Completed Specimen:  Blood from Peripheral, Forearm, Left Updated:  03/22/18 1013     Blood Culture, Routine No Growth to date     Blood Culture, Routine No Growth to date    Blood culture [[id number]] Collected:  03/18/18 0748    Order Status:  Completed Specimen:  Blood from Peripheral, Hand, Right Updated:  03/22/18 1012     Blood Culture, Routine No Growth to date     Blood Culture, Routine No Growth to date     Blood Culture, Routine No Growth to date     Blood Culture, Routine No Growth to date     Blood Culture, Routine No Growth to date    Blood  culture [[id number]] Collected:  03/18/18 0618    Order Status:  Completed Specimen:  Blood from Peripheral, Hand, Left Updated:  03/21/18 1108     Blood Culture, Routine Gram stain aer bottle: Gram positive cocci in clusters resembling Staph      Blood Culture, Routine Results called to and read back by:Omid Cody RN 03/19/2018  10:55     Blood Culture, Routine --     COAGULASE-NEGATIVE STAPHYLOCOCCUS SPECIES  Organism is a probable contaminant      Blood culture [[id number]] Collected:  03/13/18 1637    Order Status:  Completed Specimen:  Blood from Peripheral, Antecubital, Left Updated:  03/18/18 2012     Blood Culture, Routine No growth after 5 days.    Blood culture [[id number]] Collected:  03/13/18 1701    Order Status:  Completed Specimen:  Blood from Peripheral, Forearm, Left Updated:  03/18/18 2012     Blood Culture, Routine No growth after 5 days.    Blood culture [[id number]]     Order Status:  Canceled Specimen:  Blood     Blood culture [[id number]]     Order Status:  Canceled Specimen:  Blood     Culture, Respiratory with Gram Stain [[id number]] Collected:  03/15/18 1541    Order Status:  Completed Specimen:  Respiratory from Sputum Updated:  03/17/18 0914     Respiratory Culture No growth     Gram Stain (Respiratory) <10 epithelial cells per low power field.     Gram Stain (Respiratory) Rare WBC's      Gram Stain (Respiratory) No organisms seen    Culture, Respiratory with Gram Stain [[id number]] Collected:  03/14/18 1137    Order Status:  Completed Specimen:  Respiratory from BAL, TRIPP Updated:  03/16/18 1046     Respiratory Culture No growth     Gram Stain (Respiratory) No WBC's     Gram Stain (Respiratory) No organisms seen    AFB Culture & Smear [[id number]] Collected:  03/14/18 1137    Order Status:  Completed Specimen:  Bronchial Wash from Amniotic Fluid Updated:  03/15/18 2127     AFB Culture & Smear Culture in progress     AFB CULTURE STAIN No acid fast bacilli seen.          Significant Imaging: I  have reviewed all pertinent imaging results/findings within the past 24 hours.

## 2018-03-22 NOTE — SUBJECTIVE & OBJECTIVE
Interval History: No issues overnight. Intubated. Grimaces to pain.     Continuous Infusions:   sodium chloride 0.9% 200 mL/hr at 03/21/18 1336    dexmedetomidine (PRECEDEX) infusion Stopped (03/21/18 0750)    insulin (HUMAN R) infusion (adults) 1.1 Units/hr (03/22/18 0700)    norepinephrine bitartrate-D5W Stopped (03/21/18 0923)    vasopressin (PITRESSIN) infusion Stopped (03/22/18 0715)     Scheduled Meds:   chlorhexidine  15 mL Mouth/Throat BID    docusate  100 mg Per NG tube BID    escitalopram oxalate  20 mg Per NG tube Daily    famotidine (PF)  20 mg Intravenous Daily    hydrocortisone sodium succinate  100 mg Intravenous Q8H    levothyroxine  125 mcg Per NG tube Before breakfast    polyethylene glycol  17 g Per NG tube Daily    posaconazole 200 mg/5ml (40 mg/ml)  200 mg Per NG tube TID WM    sodium chloride 0.9%  3 mL Intravenous Q8H    vancomycin (VANCOCIN) IVPB  1,000 mg Intravenous Q24H     PRN Meds:sodium chloride, sodium chloride, sodium chloride, sodium chloride, sodium chloride, sodium chloride, dextrose 50%, dextrose 50%, glucagon (human recombinant), glucose, glucose, k phos di & mono-sod phos mono    Review of patient's allergies indicates:   Allergen Reactions    No known drug allergies      Objective:     Vital Signs (Most Recent):  Temp: 98 °F (36.7 °C) (03/22/18 0710)  Pulse: 108 (03/22/18 0710)  Resp: (!) 22 (03/22/18 0200)  BP: 106/62 (03/21/18 1500)  SpO2: 100 % (03/22/18 0710) Vital Signs (24h Range):  Temp:  [97.5 °F (36.4 °C)-98.9 °F (37.2 °C)] 98 °F (36.7 °C)  Pulse:  [] 108  Resp:  [22] 22  SpO2:  [95 %-100 %] 100 %  BP: (102-106)/(56-62) 106/62  Arterial Line BP: (106-148)/(49-71) 148/71     Patient Vitals for the past 72 hrs (Last 3 readings):   Weight   03/22/18 0526 71.5 kg (157 lb 10.1 oz)   03/19/18 0913 76.3 kg (168 lb 3.4 oz)     Body mass index is 24.69 kg/m².      Intake/Output Summary (Last 24 hours) at 03/22/18 0731  Last data filed at 03/22/18 0715    Gross per 24 hour   Intake          7074.22 ml   Output             8321 ml   Net         -1246.78 ml        Telemetry: ST  Physical Exam   Constitutional: He appears well-developed and well-nourished.   Appears ill   HENT:   Head: Normocephalic and atraumatic.   Eyes: Conjunctivae are normal. Pupils are equal, round, and reactive to light.   Neck: No thyromegaly present.   RIJ trialysis   Cardiovascular: Regular rhythm.    Pulmonary/Chest:   Intubated and ventilated  Breath sounds coarse bilaterally   Abdominal: Soft.   No bowel sounds. + ascites   Musculoskeletal:   R index tow with blue discoloration of tip noted.  R hand fingers dusky. Hand/feet warm   Neurological:   Responds to pain. Does not follow commands.    Skin: Skin is warm and dry. Capillary refill takes less than 2 seconds.       Significant Labs:  CBC:    Recent Labs  Lab 03/21/18  0756 03/21/18  1508 03/21/18  2343   WBC 3.29* 3.64* 3.87*   RBC 2.47* 2.55* 2.51*   HGB 7.1* 7.4* 7.1*   HCT 21.9* 22.3* 22.3*   PLT 69* 61* 78*   MCV 89 88 89   MCH 28.7 29.0 28.3   MCHC 32.4 33.2 31.8*     BNP:  No results for input(s): BNP in the last 168 hours.    Invalid input(s): BNPTRIAGELBLO  CMP:    Recent Labs  Lab 03/20/18  0438  03/21/18  0350 03/21/18  1351 03/21/18  2148 03/22/18  0358   *  153*  < > 166*  166* 165* 182* 158*  158*   CALCIUM 8.3*  8.3*  < > 8.5*  8.5* 8.0* 8.3* 8.1*  8.1*   ALBUMIN 2.6*  2.6*  < > 2.6*  2.6* 2.7* 2.7* 2.8*  2.8*   PROT 5.9*  --  5.7*  --   --  6.2     143  < > 144  144 144 143 144  144   K 4.8  4.8  < > 3.2*  3.2* 4.2 3.8 3.8  3.8   CO2 18*  18*  < > 19*  19* 26 21* 24  24     105  < > 98  98 96 99 101  101   BUN 10  10  < > 12  12 7 11 9  9   CREATININE 0.9  0.9  < > 1.0  1.0 0.8 1.0 0.8  0.8   ALKPHOS 165*  --  158*  --   --  182*   ALT 51*  --  46*  --   --  43   *  --  92*  --   --  76*   BILITOT 2.6*  --  3.1*  --   --  2.4*   < > = values in this interval not  displayed.   Coagulation:     Recent Labs  Lab 03/21/18  0756 03/21/18  1508 03/21/18  2343   INR 1.6* 1.5* 1.7*     LDH:  No results for input(s): LDH in the last 72 hours.  Microbiology:  Microbiology Results (last 7 days)     Procedure Component Value Units Date/Time    Blood culture [[id number]] Collected:  03/21/18 0815    Order Status:  Completed Specimen:  Blood from Peripheral, Forearm, Left Updated:  03/21/18 1715     Blood Culture, Routine No Growth to date    Blood culture [[id number]] Collected:  03/18/18 0618    Order Status:  Completed Specimen:  Blood from Peripheral, Hand, Left Updated:  03/21/18 1108     Blood Culture, Routine Gram stain aer bottle: Gram positive cocci in clusters resembling Staph      Blood Culture, Routine Results called to and read back by:Omid Cody RN 03/19/2018  10:55     Blood Culture, Routine --     COAGULASE-NEGATIVE STAPHYLOCOCCUS SPECIES  Organism is a probable contaminant      Blood culture [[id number]] Collected:  03/18/18 0748    Order Status:  Completed Specimen:  Blood from Peripheral, Hand, Right Updated:  03/21/18 1012     Blood Culture, Routine No Growth to date     Blood Culture, Routine No Growth to date     Blood Culture, Routine No Growth to date     Blood Culture, Routine No Growth to date    Blood culture [[id number]] Collected:  03/13/18 1637    Order Status:  Completed Specimen:  Blood from Peripheral, Antecubital, Left Updated:  03/18/18 2012     Blood Culture, Routine No growth after 5 days.    Blood culture [[id number]] Collected:  03/13/18 1701    Order Status:  Completed Specimen:  Blood from Peripheral, Forearm, Left Updated:  03/18/18 2012     Blood Culture, Routine No growth after 5 days.    Blood culture [[id number]]     Order Status:  Canceled Specimen:  Blood     Blood culture [[id number]]     Order Status:  Canceled Specimen:  Blood     Culture, Respiratory with Gram Stain [[id number]] Collected:  03/15/18 1541    Order Status:  Completed Specimen:   Respiratory from Sputum Updated:  03/17/18 0914     Respiratory Culture No growth     Gram Stain (Respiratory) <10 epithelial cells per low power field.     Gram Stain (Respiratory) Rare WBC's      Gram Stain (Respiratory) No organisms seen    Culture, Respiratory with Gram Stain [[id number]] Collected:  03/14/18 1137    Order Status:  Completed Specimen:  Respiratory from BAL, TRIPP Updated:  03/16/18 1046     Respiratory Culture No growth     Gram Stain (Respiratory) No WBC's     Gram Stain (Respiratory) No organisms seen    AFB Culture & Smear [[id number]] Collected:  03/14/18 1137    Order Status:  Completed Specimen:  Bronchial Wash from Amniotic Fluid Updated:  03/15/18 2127     AFB Culture & Smear Culture in progress     AFB CULTURE STAIN No acid fast bacilli seen.    Respiratory Viral Panel by PCR [facility]; Nasal Swab [[id number]]  (Abnormal) Collected:  03/13/18 1733    Order Status:  Completed Specimen:  Respiratory Updated:  03/15/18 1302     Respiratory Virus Panel, source Nasal Swab     RVP - Adenovirus Not Detected     Comment: Detects Serotypes B and E. Detection of Serotype C may   be limited. If Adenovirus infection is suspected and a   Not Detected result is returned the sample should be   re-tested for Adenovirus using an independent method  (e.g. Sequenom Adenovirus Quantitative Real-Time  PCR test.          Enterovirus Not Detected     Comment: Cross-reactivity has been observed between certain Rhinovirus  strains and the Enterovirus assay.          Human Bocavirus Not Detected     Human Coronavirus Not Detected     Comment: The Human Coronavirus assay detects Human coronavirus types  229E, OC43,NL63 and HKU1.          RVP - Human Metapneumovirus (hMPV) Not Detected     RVP - Influenza A Not Detected     Influenza A - C9C8-65 Not Detected     RVP - Influenza B Not Detected     Parainfluenza Not Detected     Respiratory Syncytial VirusVirus (RSV) A Positive (A)     Comment: The Respiratory  Syncytial Viral assay detects types A and B,  however it does not distinguish between the two.          RVP - Rhinovirus Not Detected     Comment: Cross-Reactivity has been observed between certain   Rhinovirus strains and the Enterovirus assay.  Target Enriched Mulitplex Polymerase Chain Reaction (TEM-PCR)  allows for the detection of multiple pathogens out of a single  reaction.  This test was developed and its performance   characteristics determined by [facility].  It has not   been cleared or approved by the U.S.Food and Drug Administration.  Results should be used in conjunction with clinical findings,   and should not form the sole basis for a diagnosis or treatment  decision.  TEM-PCR is a licensed technology of [employer].         Narrative:       Receiving Lab:->[facility]    Culture, Respiratory with Gram Stain [[id number]] Collected:  03/13/18 1441    Order Status:  Completed Specimen:  Respiratory from Sputum, Expectorated Updated:  03/15/18 1237     Respiratory Culture Normal respiratory hank     Gram Stain (Respiratory) <10 epithelial cells per low power field.     Gram Stain (Respiratory) Rare WBC's     Gram Stain (Respiratory) Moderate Gram positive cocci     Gram Stain (Respiratory) Many Gram negative rods    Fungus culture [[id number]] Collected:  03/14/18 1137    Order Status:  Completed Specimen:  Bronchial Wash from Amniotic Fluid Updated:  03/15/18 1124     Fungus (Mycology) Culture Culture in progress          I have reviewed all pertinent labs within the past 24 hours.    Estimated Creatinine Clearance: 110.2 mL/min (based on SCr of 0.8 mg/dL).    Diagnostic Results:  I have reviewed all pertinent imaging results/findings within the past 24 hours.

## 2018-03-22 NOTE — PROGRESS NOTES
[facility]  Pulm/Critical Care - Medicine  Progress Note    Patient Name: Lv Crocker  MRN: [id number]  Admission Date: 3/11/2018  Hospital Length of Stay: 10 days  Code Status: Full Code  Attending Provider: Jose A Lloyd MD  Primary Care Provider: Philippe Mohr MD   Principal Problem: Acute respiratory failure with hypoxia    Subjective:   Was seen and examined. The patient has been off sedation overnight and not waking up as expected. No fevers overnight. Fibrinogen still < 70 and therefore getting cryo.  Review of Systems   Unable to obtain  Objective:     Vital Signs (Most Recent):  Temp: 98.8 °F (37.1 °C) (03/22/18 1130)  Pulse: (!) 122 (03/22/18 1400)  Resp: (!) 29 (03/22/18 1400)  BP: 139/79 (03/22/18 0730)  SpO2: 100 % (03/22/18 1400) Vital Signs (24h Range):  Temp:  [97.5 °F (36.4 °C)-98.9 °F (37.2 °C)] 98.8 °F (37.1 °C)  Pulse:  [104-123] 122  Resp:  [22-31] 29  SpO2:  [95 %-100 %] 100 %  BP: (106-139)/(62-79) 139/79  Arterial Line BP: (111-148)/(51-71) 133/63     Weight: 71.5 kg (157 lb 10.1 oz)  Body mass index is 24.69 kg/m².      Intake/Output Summary (Last 24 hours) at 03/22/18 1431  Last data filed at 03/22/18 1400   Gross per 24 hour   Intake          5691.88 ml   Output             6916 ml   Net         -1224.12 ml       Physical Exam   Constitutional: He appears well-developed and well-nourished.    mechanical ventilation. Off sedation opens eyes sometimes.   HENT:   Head: Normocephalic and atraumatic.   Neck: Normal range of motion. Neck supple. No tracheal deviation present. No thyromegaly present.   Cardiovascular:   tachycardic   Pulmonary/Chest:   On the vent    Abdominal: Soft.   Musculoskeletal: He exhibits no edema.   Neurological:   unresponsive   Skin: Skin is warm and dry.       Vents:  Vent Mode: A/C (03/22/18 1300)  Ventilator Initiated: Yes (03/14/18 0932)  Set Rate: 18 bmp (03/22/18 1300)  Vt Set: 420 mL (03/22/18 1300)  Pressure Support: 0 cmH20  (03/22/18 0909)  PEEP/CPAP: 5 cmH20 (03/22/18 1300)  Oxygen Concentration (%): 40 (03/22/18 1400)  Peak Airway Pressure: 29 cmH2O (03/22/18 1300)  Plateau Pressure: 30 cmH20 (03/22/18 0749)  Total Ve: 12.3 mL (03/22/18 1300)  F/VT Ratio<105 (RSBI): (!) 68.72 (03/22/18 1300)    Lines/Drains/Airways     Central Venous Catheter Line                 Trialysis (Dialysis) Catheter 03/14/18 0813 right internal jugular 8 days          Drain                 NG/OG Tube 03/14/18 1300 [facility] mouth 8 days          Airway                 Airway - Non-Surgical 03/14/18 0930 Endotracheal Tube 8 days          Arterial Line                 Arterial Line 03/14/18 1600 Right Femoral 7 days          Peripheral Intravenous Line                 Midline Catheter Insertion/Assessment  - Single Lumen 03/17/18 1454 Left cephalic vein (lateral side of arm) 18g x 8cm 4 days         Peripheral IV - Single Lumen 03/20/18 1640 Right Forearm 1 day                Significant Labs:    CBC/Anemia Profile:    Recent Labs  Lab 03/21/18  1508 03/21/18  2343 03/22/18  0945   WBC 3.64* 3.87* 4.80   HGB 7.4* 7.1* 7.2*   HCT 22.3* 22.3* 22.4*   PLT 61* 78* 85*   MCV 88 89 88   RDW 17.0* 17.2* 17.3*        Chemistries:    Recent Labs  Lab 03/21/18  0350 03/21/18  1351 03/21/18  2148 03/22/18  0358     144 144 143 144  144   K 3.2*  3.2* 4.2 3.8 3.8  3.8   CL 98  98 96 99 101  101   CO2 19*  19* 26 21* 24  24   BUN 12  12 7 11 9  9   CREATININE 1.0  1.0 0.8 1.0 0.8  0.8   CALCIUM 8.5*  8.5* 8.0* 8.3* 8.1*  8.1*   ALBUMIN 2.6*  2.6* 2.7* 2.7* 2.8*  2.8*   PROT 5.7*  --   --  6.2   BILITOT 3.1*  --   --  2.4*   ALKPHOS 158*  --   --  182*   ALT 46*  --   --  43   AST 92*  --   --  76*   MG 2.0  2.0 1.6 1.7 1.9  1.9   PHOS 1.9* 2.5* 2.6* 1.8*           Assessment/Plan:   1. ARDS  2. CACHORRO  3. DIC      42 yeear-old male with a PMH of a heart transplant who was admitted to the hospital on 3/11 for diarrhea and cough. He was intubated on  5/14 for hypoxemic and hypercapnic resp failure. His CXR has improved. Off sedation, he is still unresponsive. Plan:    -D/C all sedation. CT brain no acute findings. EEG ordered.  -LTV ventilation. Will decrease to 400 ml today because of resp alkalosis but this is being driven by his RR.  -CRRT  -Abs managed by ID   -DVT prophylaxis    Cont to hold all sedation. SBT after he is more awake.    Geri Stringer MD  Critical Care - Medicine  [facility]

## 2018-03-22 NOTE — PROGRESS NOTES
"[facility]  Endocrinology  Progress Note    Admit Date: 3/11/2018     Reason for Consult: abnormal TFTs    Surgical Procedure and Date: s/p heart transplant 2014     Diabetes diagnosis year: 5yo (T1DM)     Home Diabetes Medications:    Levemir 15u qHS  Novolog 4u AC     How often checking glucose at home? 4 times daily   BG readings on regimen: 150-200s  Hypoglycemia on the regimen?  Yes, rarely - treats appropriately (light snack, glucose tab)  Missed doses on regimen?  No        Diabetes Complications include:     Hyperglycemia, Hypoglycemia  and Diabetic chronic kidney disease          Complicating diabetes co morbidities:   N/A     HPI:   Patient is a 42 y.o. male with a diagnosis of T1DM, HTN, hypothyroidism, s/p heart transplant.  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 He required serial paracentesis and thoracentesis until he underwent a VATS with pleurX catheter placement on 1/11/2018 and removed 2/7/18.       Presented to hospital secondary to diarrhea and cough.  Upon initial labs, TSH was decreased (0.101) and free T4 1.33.  Endocrinology consulted to assist in management of these labs.  He was last seen in clinic by Kamala Vázquez NP 11/2017.   Previous hospitalization, endocrine consulted for same problem.  During that visit, recommended decreasing LT4 to 125mcg daily. Unfortunately, patient was discharged on previous dose of 150mcg daily.     Interval HPI:   Overnight events:  Remains intubated and sedated on intensive insulin gtt along with TF with BG at goal with minimal requirement. On abx and antiviral per ID recs.     /79 (BP Location: Right arm, Patient Position: Lying)   Pulse (!) 116   Temp 98 °F (36.7 °C) (Axillary)   Resp (!) 22   Ht 5' 7" (1.702 m)   Wt 71.5 kg (157 lb 10.1 oz)   SpO2 100%   BMI 24.69 kg/m²       Labs Reviewed and Include      Recent " Labs  Lab 03/22/18  0358   *  158*   CALCIUM 8.1*  8.1*   ALBUMIN 2.8*  2.8*   PROT 6.2     144   K 3.8  3.8   CO2 24  24     101   BUN 9  9   CREATININE 0.8  0.8   ALKPHOS 182*   ALT 43   AST 76*   BILITOT 2.4*     Lab Results   Component Value Date    WBC 3.87 (L) 03/21/2018    HGB 7.1 (L) 03/21/2018    HCT 22.3 (L) 03/21/2018    MCV 89 03/21/2018    PLT 78 (L) 03/21/2018     No results for input(s): TSH, FREET4 in the last 168 hours.  Lab Results   Component Value Date    HGBA1C 6.9 (H) 02/11/2018       Nutritional status:   Body mass index is 24.69 kg/m².  Lab Results   Component Value Date    ALBUMIN 2.8 (L) 03/22/2018    ALBUMIN 2.8 (L) 03/22/2018    ALBUMIN 2.7 (L) 03/21/2018     Lab Results   Component Value Date    PREALBUMIN 5 (L) 03/15/2018    PREALBUMIN 18 (L) 03/24/2015    PREALBUMIN 19 (L) 12/17/2014       Estimated Creatinine Clearance: 110.2 mL/min (based on SCr of 0.8 mg/dL).    Accu-Checks  Recent Labs      03/21/18   1424  03/21/18   1621  03/21/18   1804  03/21/18   2033  03/21/18   2210  03/21/18   2345  03/22/18   0225  03/22/18   0406  03/22/18   0551  03/22/18   0944   POCTGLUCOSE  177*  209*  184*  167*  174*  195*  173*  152*  136*  129*       Current Medications and/or Treatments Impacting Glycemic Control  Immunotherapy:  Immunosuppressants     None        Steroids:   Hormones     Start     Stop Route Frequency Ordered    03/17/18 1530  vasopressin (PITRESSIN) 0.2 Units/mL in dextrose 5 % 100 mL infusion      -- IV Continuous 03/17/18 1424    03/15/18 1400  hydrocortisone sodium succinate injection 100 mg      -- IV Every 8 hours 03/15/18 1224        Pressors:    Autonomic Drugs     Start     Stop Route Frequency Ordered    03/14/18 1045  norepinephrine 16 mg in dextrose 5 % 250 mL infusion     Question Answer Comment   Titrate by: (in mcg/kg/min) 0.02    Titrate interval: (in minutes) 2    Titrate to maintain: (MAP or SBP) MAP    Greater than: (in mmHg) 60     Maximum dose: (in mcg/kg/min) 3        -- IV Continuous 03/14/18 0937        Hyperglycemia/Diabetes Medications: Antihyperglycemics     Start     Stop Route Frequency Ordered    03/14/18 1630  insulin regular (Humulin R) 100 Units in sodium chloride 0.9% 100 mL infusion     Question:  Insulin Rate Adjustment (DO NOT MODIFY ANSWER)  Answer:  [email or web address]    -- IV Continuous 03/14/18 1530          ASSESSMENT and PLAN    Type 1 diabetes mellitus with hyperglycemia    BG goal 140-180  Continue intensive insulin gtt q2hr checks while remains intubated.   Once stable will switch to transition.   Do not suspend gtt >1 hr, pt is T1DM.    Low c peptide with glc 164. Treat as type 1.   Neg MODE 2013, neg islet cell ab on this admission.   Cannot order insulin ab while on insulin, and ZnT8 unavailable in labs.            On enteral nutrition     May adversely impact bg                      Hypothyroidism due to Hashimoto's thyroiditis     Abnormal TFTs upon admit.  Unclear if secondary to illness, but with evidence of iatrogenic hyperthyroidism in past while on above weight based dose.      Continue LT4 ng 125mcg daily (decreased from 150mcg)  Repeat TFTs in 4 weeks (due ~4/15)             Acute renal failure superimposed on stage 4 chronic kidney disease     Avoid insulin stacking             Heart transplanted     Per transplant  Avoid hypoglycemia             Current use of steroid medication     Chronic steroids PDN 2.5mg o/p.   Agree with stress dose steroids during critical illness              Palmira Dorsey MD  Endocrinology  [facility]

## 2018-03-22 NOTE — PROGRESS NOTES
"  [facility]  Adult Nutrition  Consult Note    SUMMARY     Recommendations    1. If able to resume enteral nutrition, recommend Glucerna 1.5 @ 50 mL/hr to provide 1800 calories, 99 g of protein, 911 mL fluid.  2. If high residuals continues, recommend add prokinetic agent & change TF regimen to Peptamen 1.5 Prebio @ 50 mL/hr.   = 1800 kcals, 82 g of protein, 924 mL fluid.  3. If unable to resume enteral nutrition & parenteral nutrition warranted, recommend 5/15 @ 75 mL/hr + 250 mL 20% lipids.   = 1778 kcals, 90 g of protein, 1800 mL fluid (GIR: 2.46).  4. If able to extubate & advance diet, recommend 2 gram sodium, 2000 kcal ADA diet (texture per SLP).   5. RD to monitor & follow-up.    Goals: Meet % EEN, EPN  Nutrition Goal Status: goal not met  Communication of RD Recs: reviewed with RN    Reason for Assessment    Reason for Assessment: RD follow-up  Diagnosis: other (see comments) (Resp. fx)  Relevant Medical History: Hashimoto's disease, HTN, HLD, DM, CHF, Heart tx (2014)  Interdisciplinary Rounds: did not attend    General Information Comments: Pt remains intubated. TF off at time of visit. Per RN, pt not digesting properly and having high residuals.  Nutrition Discharge Planning: Unable to determine    Nutrition Risk Screen    Nutrition Risk Screen: other (see comments)    Nutrition/Diet History    Patient Reported Diet/Restrictions/Preferences: other (see comments) (HAO)  Do you have any cultural, spiritual, Uatsdin conflicts, given your current situation?: none  Factors Affecting Nutritional Intake: NPO, on mechanical ventilation    Anthropometrics    Temp: 98.8 °F (37.1 °C)  Height Method: Stated  Height: 5' 7" (170.2 cm)  Height (inches): 67 in  Weight Method: Bed Scale  Weight: 71.5 kg (157 lb 10.1 oz)  Weight (lb): 157.63 lb  Ideal Body Weight (IBW), Male: 148 lb  % Ideal Body Weight, Male (lb): 106.51 lb  BMI (Calculated): 24.7  BMI Grade: 18.5-24.9 - normal  Usual Body " "Weight (UBW), kg:  (HAO)    Lab/Procedures/Meds    Pertinent Labs Reviewed: reviewed  Pertinent Labs Comments: Gluc 158  Pertinent Medications Reviewed: reviewed  Pertinent Medications Comments: Insulin, IVF    Physical Findings/Assessment    Overall Physical Appearance: nourished, on ventilator support  Tubes: orogastric tube  Oral/Mouth Cavity: WDL  Skin: intact    Estimated/Assessed Needs    Weight Used For Calorie Calculations: 81 kg (178 lb 9.2 oz)  Height: 5' 7" (170.2 cm)    Energy Calorie Requirements (kcal): 1897 kcal/d  Energy Need Method: [facility]    Protein Requirements:  g/d (1.2-1.5 g/kg)  Weight Used For Protein Calculations: 81 kg (178 lb 9.2 oz)    Fluid Need Method: other (see comments) (Per MD or 1 mL/kcal)    CHO Requirement: 50% total kcals     Nutrition Prescription Ordered    Current Diet Order: NPO  Current Nutrition Support Formula Ordered: Diabetisource AC    Evaluation of Received Nutrient/Fluid Intake    IV Fluid (mL): 120    Nutrition Risk    Level of Risk/Frequency of Follow-up: high     Assessment and Plan    * Acute respiratory failure with hypoxia      Nutrition Problem  Inadequate energy intake    Related to (etiology):   Inability to consume sufficient energy    Signs and Symptoms (as evidenced by):   NPO with no alternate means of nutrition     Nutrition Diagnosis Status:   Continues         Monitor and Evaluation    Food and Nutrient Intake: energy intake, food and beverage intake, enteral nutrition intake, parenteral nutrition intake  Food and Nutrient Adminstration: diet order, enteral and parenteral nutrition administration  Physical Activity and Function: nutrition-related ADLs and IADLs  Anthropometric Measurements: weight change, weight  Biochemical Data, Medical Tests and Procedures: lipid profile, inflammatory profile, glucose/endocrine profile, gastrointestinal profile, electrolyte and renal panel  Nutrition-Focused Physical Findings: overall appearance "     Nutrition Follow-Up    RD Follow-up?: Yes

## 2018-03-22 NOTE — ASSESSMENT & PLAN NOTE
41yo man w/a history of DM1, Hashimoto's thyroiditis, and ICM (s/p OHT 11/18/2014, CMV D+/R+, steroid induction, on maintenance tacro/MMF/pred; c/b early hemorrhagic tamponade requiring washout, delayed closure, and pericardial window and subsequent AF w/RVR, and CACHORRO; late course c/b ABMR 4/2015 s/p rituxan, PLEX, and IVIG; subsequent C.diff/CMV reactivation, restrictive cardiomyopathy with recurrent pleural effusions s/p VATS/pleurex catheter 1/2018 with removal 2/2018 and ascites requiring repeated LVP, and CKD) who was admitted on 3/11/2018 with ~3wks of worsening profuse non-bloody diarrhea, associated cramping abdominal pain, N/V, and a week of acute on chronic cough, SOB, hypoxia requiring intubation, and fevers and was found to have multifocal pneumonia on CT chest due to RSV-A pneumonia with suspected superimposed bacterial pneumonia. He remains critically ill with a course c/b ongoing pressor requirement, hypoxic RF, CRRT requirement, and possible evolving DIC (of note, suspect CONS in blood cx is contaminant but will prove).     - completed ribavirin/IVIG 5 day course for RSV pneumonia  - discontinue vancomycin,  CONS on B/C 3/18/18 most likely is a contaminant, B/C repeated 3/21/18 NGTD  - all other cultures remains negative  - discontinue zosyn, has been adequately treated for HAP (total 9 days)  - discontinue empiric posaconazole, (Aspergillus Ag blood and BAL negative, Blasto urine Ag negative, Histo urine Ag negative)  - all other workup is negative so far  - continue to check CMV weekly during this episode of acute illness as he is at risk of reactivation

## 2018-03-22 NOTE — SUBJECTIVE & OBJECTIVE
"Interval HPI:   Overnight events:  Remains intubated and sedated on intensive insulin gtt along with TF with BG at goal with minimal requirement. On abx and antiviral per ID recs.     /79 (BP Location: Right arm, Patient Position: Lying)   Pulse (!) 116   Temp 98 °F (36.7 °C) (Axillary)   Resp (!) 22   Ht 5' 7" (1.702 m)   Wt 71.5 kg (157 lb 10.1 oz)   SpO2 100%   BMI 24.69 kg/m²     Labs Reviewed and Include      Recent Labs  Lab 03/22/18  0358   *  158*   CALCIUM 8.1*  8.1*   ALBUMIN 2.8*  2.8*   PROT 6.2     144   K 3.8  3.8   CO2 24  24     101   BUN 9  9   CREATININE 0.8  0.8   ALKPHOS 182*   ALT 43   AST 76*   BILITOT 2.4*     Lab Results   Component Value Date    WBC 3.87 (L) 03/21/2018    HGB 7.1 (L) 03/21/2018    HCT 22.3 (L) 03/21/2018    MCV 89 03/21/2018    PLT 78 (L) 03/21/2018     No results for input(s): TSH, FREET4 in the last 168 hours.  Lab Results   Component Value Date    HGBA1C 6.9 (H) 02/11/2018       Nutritional status:   Body mass index is 24.69 kg/m².  Lab Results   Component Value Date    ALBUMIN 2.8 (L) 03/22/2018    ALBUMIN 2.8 (L) 03/22/2018    ALBUMIN 2.7 (L) 03/21/2018     Lab Results   Component Value Date    PREALBUMIN 5 (L) 03/15/2018    PREALBUMIN 18 (L) 03/24/2015    PREALBUMIN 19 (L) 12/17/2014       Estimated Creatinine Clearance: 110.2 mL/min (based on SCr of 0.8 mg/dL).    Accu-Checks  Recent Labs      03/21/18   1424  03/21/18   1621  03/21/18   1804  03/21/18   2033  03/21/18   2210  03/21/18   2345  03/22/18   0225  03/22/18   0406  03/22/18   0551  03/22/18   0944   POCTGLUCOSE  177*  209*  184*  167*  174*  195*  173*  152*  136*  129*       Current Medications and/or Treatments Impacting Glycemic Control  Immunotherapy:  Immunosuppressants     None        Steroids:   Hormones     Start     Stop Route Frequency Ordered    03/17/18 1530  vasopressin (PITRESSIN) 0.2 Units/mL in dextrose 5 % 100 mL infusion      -- IV Continuous " 03/17/18 1424    03/15/18 1400  hydrocortisone sodium succinate injection 100 mg      -- IV Every 8 hours 03/15/18 1224        Pressors:    Autonomic Drugs     Start     Stop Route Frequency Ordered    03/14/18 1045  norepinephrine 16 mg in dextrose 5 % 250 mL infusion     Question Answer Comment   Titrate by: (in mcg/kg/min) 0.02    Titrate interval: (in minutes) 2    Titrate to maintain: (MAP or SBP) MAP    Greater than: (in mmHg) 60    Maximum dose: (in mcg/kg/min) 3        -- IV Continuous 03/14/18 0937        Hyperglycemia/Diabetes Medications: Antihyperglycemics     Start     Stop Route Frequency Ordered    03/14/18 1630  insulin regular (Humulin R) 100 Units in sodium chloride 0.9% 100 mL infusion     Question:  Insulin Rate Adjustment (DO NOT MODIFY ANSWER)  Answer:  [email or web address] [id number]    -- IV Continuous 03/14/18 1537

## 2018-03-22 NOTE — PLAN OF CARE
Problem: Patient Care Overview  Goal: Plan of Care Review  Outcome: Ongoing (interventions implemented as appropriate)  No acute events throughout day. See vital signs and assessments in flowsheets. See below for updates on today's progress.     Pulmonary: Dependent on mechanical ventilator, AC mode    Cardiovascular: HR is normal sinus tachycardia rate ranges 118-128bpm, with rare PVC's noted. All pulses are palpable. Discolorations on finger tips and right 3rd toe noted - team aware.     Neurological: Makes facial grimace to pain. Pupils are equally brisk reactive to light. Corneal/Gag reflex intact. Brain CT done without contrast.     Gastrointestinal: Hypoactive bowel sound, last bowel movement recorded early morning. On continuous enteral feeding of Glucerna 10ml/hour - moderate residual noted.    Genitourinary: Anuric, on CRRT    Endocrine: Known Dm, on insulin drip with blood glucose monitoring every two hours.    Integumentary/Other: Skin intact. Continuous EEG still pending.    Infusions: Insulin drip.    Patient progressing towards goals as tolerated, plan of care communicated and reviewed with Lv Crocker and family. All concerns addressed. Will continue to monitor.

## 2018-03-22 NOTE — SUBJECTIVE & OBJECTIVE
Interval History:   Completed course for RSV PNA; isolation discontinued this morning. Weaned off levophed yesterday; vaso stopped this morning. No events overnight. Net negative 1.3L yesterday. Minimal vent requirements. Hourly intake 15cc/hr with tube feeds and insulin. Wife concerned that he won't wake up. S/p CTH this AM.     Review of patient's allergies indicates:   Allergen Reactions    No known drug allergies      Current Facility-Administered Medications   Medication Frequency    0.9%  NaCl infusion (CRRT USE ONLY) Continuous    0.9%  NaCl infusion (for blood administration) Q24H PRN    0.9%  NaCl infusion (for blood administration) Q24H PRN    0.9%  NaCl infusion (for blood administration) Q24H PRN    0.9%  NaCl infusion (for blood administration) Q24H PRN    0.9%  NaCl infusion (for blood administration) Q24H PRN    0.9%  NaCl infusion (for blood administration) Q24H PRN    chlorhexidine 0.12 % solution 15 mL BID    dexmedetomidine (PRECEDEX) 400mcg/100mL 0.9% NaCL infusion Continuous    dextrose 50% injection 12.5 g PRN    dextrose 50% injection 25 g PRN    docusate 50 mg/5 mL liquid 100 mg BID    escitalopram oxalate tablet 20 mg Daily    famotidine (PF) injection 20 mg Daily    glucagon (human recombinant) injection 1 mg PRN    glucose chewable tablet 16 g PRN    glucose chewable tablet 24 g PRN    hydrocortisone sodium succinate injection 100 mg Q8H    insulin regular (Humulin R) 100 Units in sodium chloride 0.9% 100 mL infusion Continuous    k phos di & mono-sod phos mono 250 mg tablet 2 tablet Q4H PRN    levothyroxine tablet 125 mcg Before breakfast    norepinephrine 16 mg in dextrose 5 % 250 mL infusion Continuous    polyethylene glycol packet 17 g Daily    posaconazole 200 mg/5ml (40 mg/ml) suspension 200 mg TID WM    sodium chloride 0.9% flush 3 mL Q8H    vancomycin 1 gram/250 mL in sodium chloride 0.9% IVPB 1 g Q24H    vasopressin (PITRESSIN) 0.2 Units/mL in dextrose  5 % 100 mL infusion Continuous       Objective:     Vital Signs (Most Recent):  Temp: 98.8 °F (37.1 °C) (03/22/18 1130)  Pulse: (!) 123 (03/22/18 1230)  Resp: (!) 30 (03/22/18 1230)  BP: 139/79 (03/22/18 0730)  SpO2: 100 % (03/22/18 1230)  O2 Device (Oxygen Therapy): ventilator (03/22/18 1230) Vital Signs (24h Range):  Temp:  [97.5 °F (36.4 °C)-98.9 °F (37.2 °C)] 98.8 °F (37.1 °C)  Pulse:  [104-123] 123  Resp:  [22-31] 30  SpO2:  [95 %-100 %] 100 %  BP: (106-139)/(62-79) 139/79  Arterial Line BP: (106-148)/(50-71) 141/68     Weight: 71.5 kg (157 lb 10.1 oz) (03/22/18 0526)  Body mass index is 24.69 kg/m².  Body surface area is 1.84 meters squared.    I/O last 3 completed shifts:  In: 9794 [I.V.:8027.8; Blood:156.3; NG/GT:360; IV Piggyback:1250]  Out: 48783 [Other:12294]    Physical Exam   Constitutional: He appears well-developed and well-nourished.   HENT:   Head: Normocephalic and atraumatic.   Neck: Neck supple. No tracheal deviation present.   Cardiovascular: Regular rhythm.  Tachycardia present.    Pulmonary/Chest: He has no wheezes. He has no rales.   Abdominal: Soft. He exhibits no distension.   Musculoskeletal: He exhibits no edema or deformity.   Skin: Skin is warm and dry. He is not diaphoretic.       Significant Labs:  ABGs:   Recent Labs  Lab 03/22/18  0408   PH 7.503*   PCO2 36.1   HCO3 28.4*   POCSATURATED 99   BE 5     CBC:   Recent Labs  Lab 03/22/18  0945   WBC 4.80   RBC 2.56*   HGB 7.2*   HCT 22.4*   PLT 85*   MCV 88   MCH 28.1   MCHC 32.1     CMP:   Recent Labs  Lab 03/22/18  0358   *  158*   CALCIUM 8.1*  8.1*   ALBUMIN 2.8*  2.8*   PROT 6.2     144   K 3.8  3.8   CO2 24  24     101   BUN 9  9   CREATININE 0.8  0.8   ALKPHOS 182*   ALT 43   AST 76*   BILITOT 2.4*     All labs within the past 24 hours have been reviewed.     Significant Imaging:  Labs: Reviewed

## 2018-03-22 NOTE — PROGRESS NOTES
[facility]  Heart Transplant  Progress Note    Patient Name: Lv Crocker  MRN: [id number]  Admission Date: 3/11/2018  Hospital Length of Stay: 10 days  Attending Physician: Jose A Lloyd MD  Primary Care Provider: Philippe Mohr MD  Principal Problem:Acute respiratory failure with hypoxia    Subjective:     Interval History: No issues overnight. Intubated. Grimaces to pain.     Continuous Infusions:   sodium chloride 0.9% 200 mL/hr at 03/21/18 1336    dexmedetomidine (PRECEDEX) infusion Stopped (03/21/18 0750)    insulin (HUMAN R) infusion (adults) 1.1 Units/hr (03/22/18 0700)    norepinephrine bitartrate-D5W Stopped (03/21/18 0923)    vasopressin (PITRESSIN) infusion Stopped (03/22/18 0715)     Scheduled Meds:   chlorhexidine  15 mL Mouth/Throat BID    docusate  100 mg Per NG tube BID    escitalopram oxalate  20 mg Per NG tube Daily    famotidine (PF)  20 mg Intravenous Daily    hydrocortisone sodium succinate  100 mg Intravenous Q8H    levothyroxine  125 mcg Per NG tube Before breakfast    polyethylene glycol  17 g Per NG tube Daily    posaconazole 200 mg/5ml (40 mg/ml)  200 mg Per NG tube TID WM    sodium chloride 0.9%  3 mL Intravenous Q8H    vancomycin (VANCOCIN) IVPB  1,000 mg Intravenous Q24H     PRN Meds:sodium chloride, sodium chloride, sodium chloride, sodium chloride, sodium chloride, sodium chloride, dextrose 50%, dextrose 50%, glucagon (human recombinant), glucose, glucose, k phos di & mono-sod phos mono    Review of patient's allergies indicates:   Allergen Reactions    No known drug allergies      Objective:     Vital Signs (Most Recent):  Temp: 98 °F (36.7 °C) (03/22/18 0710)  Pulse: 108 (03/22/18 0710)  Resp: (!) 22 (03/22/18 0200)  BP: 106/62 (03/21/18 1500)  SpO2: 100 % (03/22/18 0710) Vital Signs (24h Range):  Temp:  [97.5 °F (36.4 °C)-98.9 °F (37.2 °C)] 98 °F (36.7 °C)  Pulse:  [] 108  Resp:  [22] 22  SpO2:  [95 %-100 %] 100 %  BP:  (102-106)/(56-62) 106/62  Arterial Line BP: (106-148)/(49-71) 148/71     Patient Vitals for the past 72 hrs (Last 3 readings):   Weight   03/22/18 0526 71.5 kg (157 lb 10.1 oz)   03/19/18 0913 76.3 kg (168 lb 3.4 oz)     Body mass index is 24.69 kg/m².      Intake/Output Summary (Last 24 hours) at 03/22/18 0731  Last data filed at 03/22/18 0715   Gross per 24 hour   Intake          7074.22 ml   Output             8321 ml   Net         -1246.78 ml        Telemetry: ST  Physical Exam   Constitutional: He appears well-developed and well-nourished.   Appears ill   HENT:   Head: Normocephalic and atraumatic.   Eyes: Conjunctivae are normal. Pupils are equal, round, and reactive to light.   Neck: No thyromegaly present.   RIJ trialysis   Cardiovascular: Regular rhythm.    Pulmonary/Chest:   Intubated and ventilated  Breath sounds coarse bilaterally   Abdominal: Soft.   No bowel sounds. + ascites   Musculoskeletal:   R index tow with blue discoloration of tip noted.  R hand fingers dusky. Hand/feet warm   Neurological:   Responds to pain. Does not follow commands.    Skin: Skin is warm and dry. Capillary refill takes less than 2 seconds.       Significant Labs:  CBC:    Recent Labs  Lab 03/21/18  0756 03/21/18  1508 03/21/18  2343   WBC 3.29* 3.64* 3.87*   RBC 2.47* 2.55* 2.51*   HGB 7.1* 7.4* 7.1*   HCT 21.9* 22.3* 22.3*   PLT 69* 61* 78*   MCV 89 88 89   MCH 28.7 29.0 28.3   MCHC 32.4 33.2 31.8*     BNP:  No results for input(s): BNP in the last 168 hours.    Invalid input(s): BNPTRIAGELBLO  CMP:    Recent Labs  Lab 03/20/18  0438  03/21/18  0350 03/21/18  1351 03/21/18  2148 03/22/18  0358   *  153*  < > 166*  166* 165* 182* 158*  158*   CALCIUM 8.3*  8.3*  < > 8.5*  8.5* 8.0* 8.3* 8.1*  8.1*   ALBUMIN 2.6*  2.6*  < > 2.6*  2.6* 2.7* 2.7* 2.8*  2.8*   PROT 5.9*  --  5.7*  --   --  6.2     143  < > 144  144 144 143 144  144   K 4.8  4.8  < > 3.2*  3.2* 4.2 3.8 3.8  3.8   CO2 18*  18*  < > 19*   19* 26 21* 24  24     105  < > 98  98 96 99 101  101   BUN 10  10  < > 12  12 7 11 9  9   CREATININE 0.9  0.9  < > 1.0  1.0 0.8 1.0 0.8  0.8   ALKPHOS 165*  --  158*  --   --  182*   ALT 51*  --  46*  --   --  43   *  --  92*  --   --  76*   BILITOT 2.6*  --  3.1*  --   --  2.4*   < > = values in this interval not displayed.   Coagulation:     Recent Labs  Lab 03/21/18  0756 03/21/18  1508 03/21/18  2343   INR 1.6* 1.5* 1.7*     LDH:  No results for input(s): LDH in the last 72 hours.  Microbiology:  Microbiology Results (last 7 days)     Procedure Component Value Units Date/Time    Blood culture [[id number]] Collected:  03/21/18 0815    Order Status:  Completed Specimen:  Blood from Peripheral, Forearm, Left Updated:  03/21/18 1715     Blood Culture, Routine No Growth to date    Blood culture [[id number]] Collected:  03/18/18 0618    Order Status:  Completed Specimen:  Blood from Peripheral, Hand, Left Updated:  03/21/18 1108     Blood Culture, Routine Gram stain aer bottle: Gram positive cocci in clusters resembling Staph      Blood Culture, Routine Results called to and read back by:Omid Cody RN 03/19/2018  10:55     Blood Culture, Routine --     COAGULASE-NEGATIVE STAPHYLOCOCCUS SPECIES  Organism is a probable contaminant      Blood culture [[id number]] Collected:  03/18/18 0748    Order Status:  Completed Specimen:  Blood from Peripheral, Hand, Right Updated:  03/21/18 1012     Blood Culture, Routine No Growth to date     Blood Culture, Routine No Growth to date     Blood Culture, Routine No Growth to date     Blood Culture, Routine No Growth to date    Blood culture [[id number]] Collected:  03/13/18 1637    Order Status:  Completed Specimen:  Blood from Peripheral, Antecubital, Left Updated:  03/18/18 2012     Blood Culture, Routine No growth after 5 days.    Blood culture [[id number]] Collected:  03/13/18 1701    Order Status:  Completed Specimen:  Blood from Peripheral, Forearm, Left  Updated:  03/18/18 2012     Blood Culture, Routine No growth after 5 days.    Blood culture [[id number]]     Order Status:  Canceled Specimen:  Blood     Blood culture [[id number]]     Order Status:  Canceled Specimen:  Blood     Culture, Respiratory with Gram Stain [[id number]] Collected:  03/15/18 1541    Order Status:  Completed Specimen:  Respiratory from Sputum Updated:  03/17/18 0914     Respiratory Culture No growth     Gram Stain (Respiratory) <10 epithelial cells per low power field.     Gram Stain (Respiratory) Rare WBC's      Gram Stain (Respiratory) No organisms seen    Culture, Respiratory with Gram Stain [[id number]] Collected:  03/14/18 1137    Order Status:  Completed Specimen:  Respiratory from BAL, TRIPP Updated:  03/16/18 1046     Respiratory Culture No growth     Gram Stain (Respiratory) No WBC's     Gram Stain (Respiratory) No organisms seen    AFB Culture & Smear [[id number]] Collected:  03/14/18 1137    Order Status:  Completed Specimen:  Bronchial Wash from Amniotic Fluid Updated:  03/15/18 2127     AFB Culture & Smear Culture in progress     AFB CULTURE STAIN No acid fast bacilli seen.    Respiratory Viral Panel by PCR [facility]; Nasal Swab [[id number]]  (Abnormal) Collected:  03/13/18 1733    Order Status:  Completed Specimen:  Respiratory Updated:  03/15/18 1302     Respiratory Virus Panel, source Nasal Swab     RVP - Adenovirus Not Detected     Comment: Detects Serotypes B and E. Detection of Serotype C may   be limited. If Adenovirus infection is suspected and a   Not Detected result is returned the sample should be   re-tested for Adenovirus using an independent method  (e.g. [facility] Adenovirus Quantitative Real-Time  PCR test.          Enterovirus Not Detected     Comment: Cross-reactivity has been observed between certain Rhinovirus  strains and the Enterovirus assay.          Human Bocavirus Not Detected     Human Coronavirus Not Detected     Comment: The Human Coronavirus assay  detects Human coronavirus types  229E, OC43,NL63 and HKU1.          RVP - Human Metapneumovirus (hMPV) Not Detected     RVP - Influenza A Not Detected     Influenza A - D2E0-07 Not Detected     RVP - Influenza B Not Detected     Parainfluenza Not Detected     Respiratory Syncytial VirusVirus (RSV) A Positive (A)     Comment: The Respiratory Syncytial Viral assay detects types A and B,  however it does not distinguish between the two.          RVP - Rhinovirus Not Detected     Comment: Cross-Reactivity has been observed between certain   Rhinovirus strains and the Enterovirus assay.  Target Enriched Mulitplex Polymerase Chain Reaction (TEM-PCR)  allows for the detection of multiple pathogens out of a single  reaction.  This test was developed and its performance   characteristics determined by [employer]  It has not   been cleared or approved by the U.S.Food and Drug Administration.  Results should be used in conjunction with clinical findings,   and should not form the sole basis for a diagnosis or treatment  decision.  TEM-PCR is a licensed technology of [employer].         Narrative:       Receiving Lab:->[facility]    Culture, Respiratory with Gram Stain [[id number]] Collected:  03/13/18 1441    Order Status:  Completed Specimen:  Respiratory from Sputum, Expectorated Updated:  03/15/18 1237     Respiratory Culture Normal respiratory hank     Gram Stain (Respiratory) <10 epithelial cells per low power field.     Gram Stain (Respiratory) Rare WBC's     Gram Stain (Respiratory) Moderate Gram positive cocci     Gram Stain (Respiratory) Many Gram negative rods    Fungus culture [[id number]] Collected:  03/14/18 1137    Order Status:  Completed Specimen:  Bronchial Wash from Amniotic Fluid Updated:  03/15/18 1124     Fungus (Mycology) Culture Culture in progress          I have reviewed all pertinent labs within the past 24 hours.    Estimated Creatinine Clearance: 110.2 mL/min (based on SCr of 0.8  mg/dL).    Diagnostic Results:  I have reviewed all pertinent imaging results/findings within the past 24 hours.    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 Most recently has had ongoing issues with his lungs, had gotten 2 thoracenteses, after which he underwent VATS 01/19/18 followed by pleurex catheter placement and effusion drainage 01/11/18, subsequently had it removed 02/07/18 after drainage had decreased despite multiple attempts to drain it. Has been having significant coughing fits that result in him being near-syncopal at times, although difficult to say if he has passed out or not- patient most recently was admitted to the hospital for increased AGUILAR, coughing and pre-syncope 02/11-02/14/18 at [facility].   Patient was started on antibiotics for suspected bacterial infection, with some diarrhea, bronchitis, and possible pneumonia. Ct chest showed some pleural fluid collection and after talking with Dr. James, we arranged for him to receive a diagnostic tap with IR, from which the fluid collection demonstrated no growth or significant findings at all really. Cell counts do not appear to have been sent but will recheck. Patient states since his hospital stay, yesterday had a significant coughing fit again, after which he nearly passed out, is being seen in clinic today for this. Denies any cardiopulmonary complaints, no leg swelling, has some abdominal swelling, which is moderate for him. Denies significant shortness of breath with mild exertion, had 6MWT yesterday to see if he qualified for home O2, and his O2 sats actually improved after recovery from where he started initially. He and his wife admit he is in bed most days, not very active.     Mr. Crocker presented to the ED 3/11/18 with approximately 3 weeks  of worsening diarrhea and 2-3 days of sinus pressure, drainage, and worsened cough.  He notes that he had a coughing fit last night and passed out, coughed throughout most of the night.  This also happened on 2/25/18.  He last saw Dr Ferreira in clinic on 2/20/18 where he was taken off myfortic and switched to cellcept (he hadn't been on cellcept because of leukopenia when they tried post-transplant).  Though his diarrhea had improved after his last discharge, he states it has increased now to 15x/day, clear/yellow/green, no fevers, no exacerbating foods per say.  He was taken back off the cellcept and placed back on myfortic this past week and has not noticed immediate change in diarrhea. He also reports his baseline coughing fits havent improved and are minimally responsive to tessalon pearls.  Came on last night, he lost consciousness during a fit once which is relatively normal for him, and had two more during HPI.  He notes no sick contacts but does state he's had some URI symptoms as above.  His baseline vitals are usually -120 and BP 90s/60s-110s/70s.  He reports a history of intermittent diarrhea - did have Cdiff many years ago but this smells different.  No abdominal pain, no nausea, no vomiting.  No blood in diarrhea.  Appears last c-scope in 2015 with no evidence of infection or inflammatory processes.  He does report IBS which is different that this.       * Acute respiratory failure with hypoxia    -S/P Bronch and intubation 3/14. Cultures ngtd.   -RSV positive. Completed course of Ribavarin/IVIG. Per lung transplant protocol for severe RSV(given myelosuppression).   -Aspergill Ag neg from BAL.   -Urine histo/blasto antigens neg, crypto antigen neg, urine legionella neg, and Quantiferon pending.  -Appreciate ID help-->continue empiric vancomycin given GPCC(suspect contaminant but will await speciation). If repeat cultures negative , will stop today; if grows again, would exchange lines and continue  therapy.  -Will stop zosyn. Sopped azithromycin.  -Continue empiric posaconazole for now(transitioned to per tube to avoid accumulation to cyclodextrin carrier)  -Stop IV GCV prophylaxis as patient is several years out from transplant without evidence of active CMV disease; will check weekly quants -Await pending tests including: final BAL cultures and Quantiferon   -Pulmonology managing vent. Precedex off since yesterday morning. Not following commands but responds to pain. Will get CT head.        Septic shock    -Levo off since yesterday am. Will stop Vaso this morning.  -Continue stress dose hydrocortisone 100mg TID.  -Appreciate ID assistance. Abx adjustments as above.  -Will need to change out lines soon.        Acute renal failure superimposed on stage 4 chronic kidney disease    - Appreciate Neph Cx. Continue CRRT as tolerated.         Heart transplanted    - TTE 3/12/18 showed LVEF 50-55% (but no substantial change when compared to previous echo), RV normal and IVC 8  - Had -ve DSE on 11/30/17  - Current immunosuppression: Pred/Prograf/MMF on hold. Continue hydrocortisone 100mg Q8H.         Pancytopenia    - Appreciate Hem Cx.   -The patient developed thrombocytopenia 5 days after receiving enoxaparin and has a 4T score of 5 indicating a ~ 14% chance of HIT.  The patients HIT panel was positive with OD of 0.431 indicating a 1-5% chance of having HIT.  Would wait for HILDA before considering anticoagulation with argatroban. HILDA pending.    -The patients thrombocytopenia could be drug induced as well.   -The patient has a low fibrinogen, elevated INR, shistocytes on peripheral smear and elevated LDH which all point towards DIC. The haptoglobin is elevated which goes against hemolytic anemia,; however, haptoglobin is also an acute phase reactant.  As DIC is the higher on the differential would treat as such by treating underlying cause which is likely sepsis.       -Fibrinogen, INR and plt count every 8  hours.  -Give cryoprecipitate to get fibrinogen over 100.  Would give one unit at a time.  -Give FFP to get the INR below 1.8.  Would give at least two units at a time.  May consider giving the patient Vitamin K given recent use of coumadin.          Restrictive cardiomyopathy    -S/P thoracentesis X 2, followed by VATS/pleurex cath placement (January 2018) with removal of pleurex (February 2018) and 3rd thoracentesis 2/14/18 with no significant findings on fluid removal. Moderate right pleural effusion stable by CXR 3/11/18 and chest CT 3/13/18  -Last paracentesis was in January. Abd US 3/12/18 confirmed ongoing ascites (not tense at all). Will consider getting paracentesis this admit  -Continue Levophed for MAP 60. Vaso 0.04u/h.        Ileus    -Had 2 BMs 3/20.  -Continue trickle tube feeds.   -Continue bowel regimen.        Type 1 diabetes mellitus with stage 3 chronic kidney disease    - Appreciate Endocrine's recs        Hypothyroidism due to Hashimoto's thyroiditis    - TSH low at 0.101 with normal FT4. Appreciate Endocrine's recs        Critical Care Time: 50 minutes     Critical care was time spent personally by me on the following activities: development of treatment plan with patient or surrogate and bedside caregivers, discussions with consultants, evaluation of patient's response to treatment, examination of patient, ordering and performing treatments and interventions, ordering and review of laboratory studies, ordering and review of radiographic studies, pulse oximetry, re-evaluation of patient's condition. This critical care time did not overlap with that of any other provider or involve time for any procedures.      Tim Mao, NP  Heart Transplant  [facility]

## 2018-03-22 NOTE — ASSESSMENT & PLAN NOTE
- Appreciate Hem Cx.   -The patient developed thrombocytopenia 5 days after receiving enoxaparin and has a 4T score of 5 indicating a ~ 14% chance of HIT.  The patients HIT panel was positive with OD of 0.431 indicating a 1-5% chance of having HIT.  Would wait for HILDA before considering anticoagulation with argatroban. HILDA pending.    -The patients thrombocytopenia could be drug induced as well.   -The patient has a low fibrinogen, elevated INR, shistocytes on peripheral smear and elevated LDH which all point towards DIC. The haptoglobin is elevated which goes against hemolytic anemia,; however, haptoglobin is also an acute phase reactant.  As DIC is the higher on the differential would treat as such by treating underlying cause which is likely sepsis.       -Fibrinogen, INR and plt count every 8 hours.  -Give cryoprecipitate to get fibrinogen over 100.  Would give one unit at a time.  -Give FFP to get the INR below 1.8.  Would give at least two units at a time.  May consider giving the patient Vitamin K given recent use of coumadin.

## 2018-03-22 NOTE — PROGRESS NOTES
[facility]  Nephrology  Progress Note    Patient Name: Lv Crocker  MRN: [id number]  Admission Date: 3/11/2018  Hospital Length of Stay: 10 days  Attending Provider: Jose A Lloyd MD   Primary Care Physician: Philippe Mohr MD  Principal Problem:Acute respiratory failure with hypoxia    Subjective:     HPI: Mr. Crocker is a 41 yo WM with T1DM, Hashimoto thyroiditis, h/o OHTx 11/2014, and CKD stage 4 who was admitted on 3/11/18 for persistent diarrhea. He reported a 3 week history of diarrhea up to 15x/day. Associated symptoms including URI symptoms, coughing fits, and coughing syncope. Prograf level was 14.3. His post-heart transplant course has been complicated by AMR 4/2015, CMV, post-transplant restrictive cardiomyopathy, recurrent pleural effusions/ascites requiring monthly thoracenteses/paracenteses, VATS 1/11/18 with Pleur-X catheter (now removed), and CKD stage 4 with baseline sCr 2.6-3.0. Baseline -120s and baseline BP 90s-110s/60-70s. Upon admission he was started on IVF and diarrhea workup was initiated. On 3/12 he was noted to have decreased UOP despite fluids; NS was increased to 100cc/hr. He was scheduled for a colonoscopy on 3/13 however procedure was cancelled due to hypoxia and fever. He was transferred to the ICU for closer monitoring. He continued to have oliguria overnight. Bladder scan revealed 200cc of urine but patient refused osullivan catheter placement. Wife reports that patient always has a hard time urinating again after osullivan catheters are placed/removed so he refuses them. He remained hypoxic despite FiO2 70% and ABG revealed combined respiratory and metabolic acidosis with pH 7.17. He was started on BiPAP as well as a bicarb infusion at 50cc/hr. ABG with mild improvement. AM labs revealed K 6.2 and he was shifted and given kayexalate.Trialysis catheter was placed this morning and he subsequently developed hypotension and was started on levophed. He was  intubated later this morning. Nephrology consulted for CACHORRO. All history obtained by primary team and chart review as patient was intubated/sedated on exam. Consent for dialysis obtained by patient's wife and placed in chart. Of note, both of patient's parents were on dialysis.     Interval History:   Completed course for RSV PNA; isolation discontinued this morning. Weaned off levophed yesterday; vaso stopped this morning. No events overnight. Net negative 1.3L yesterday. Minimal vent requirements. Hourly intake 15cc/hr with tube feeds and insulin. Wife concerned that he won't wake up. S/p CTH this AM.     Review of patient's allergies indicates:   Allergen Reactions    No known drug allergies      Current Facility-Administered Medications   Medication Frequency    0.9%  NaCl infusion (CRRT USE ONLY) Continuous    0.9%  NaCl infusion (for blood administration) Q24H PRN    0.9%  NaCl infusion (for blood administration) Q24H PRN    0.9%  NaCl infusion (for blood administration) Q24H PRN    0.9%  NaCl infusion (for blood administration) Q24H PRN    0.9%  NaCl infusion (for blood administration) Q24H PRN    0.9%  NaCl infusion (for blood administration) Q24H PRN    chlorhexidine 0.12 % solution 15 mL BID    dexmedetomidine (PRECEDEX) 400mcg/100mL 0.9% NaCL infusion Continuous    dextrose 50% injection 12.5 g PRN    dextrose 50% injection 25 g PRN    docusate 50 mg/5 mL liquid 100 mg BID    escitalopram oxalate tablet 20 mg Daily    famotidine (PF) injection 20 mg Daily    glucagon (human recombinant) injection 1 mg PRN    glucose chewable tablet 16 g PRN    glucose chewable tablet 24 g PRN    hydrocortisone sodium succinate injection 100 mg Q8H    insulin regular (Humulin R) 100 Units in sodium chloride 0.9% 100 mL infusion Continuous    k phos di & mono-sod phos mono 250 mg tablet 2 tablet Q4H PRN    levothyroxine tablet 125 mcg Before breakfast    norepinephrine 16 mg in dextrose 5 % 250 mL  infusion Continuous    polyethylene glycol packet 17 g Daily    posaconazole 200 mg/5ml (40 mg/ml) suspension 200 mg TID WM    sodium chloride 0.9% flush 3 mL Q8H    vancomycin 1 gram/250 mL in sodium chloride 0.9% IVPB 1 g Q24H    vasopressin (PITRESSIN) 0.2 Units/mL in dextrose 5 % 100 mL infusion Continuous       Objective:     Vital Signs (Most Recent):  Temp: 98.8 °F (37.1 °C) (03/22/18 1130)  Pulse: (!) 123 (03/22/18 1230)  Resp: (!) 30 (03/22/18 1230)  BP: 139/79 (03/22/18 0730)  SpO2: 100 % (03/22/18 1230)  O2 Device (Oxygen Therapy): ventilator (03/22/18 1230) Vital Signs (24h Range):  Temp:  [97.5 °F (36.4 °C)-98.9 °F (37.2 °C)] 98.8 °F (37.1 °C)  Pulse:  [104-123] 123  Resp:  [22-31] 30  SpO2:  [95 %-100 %] 100 %  BP: (106-139)/(62-79) 139/79  Arterial Line BP: (106-148)/(50-71) 141/68     Weight: 71.5 kg (157 lb 10.1 oz) (03/22/18 0526)  Body mass index is 24.69 kg/m².  Body surface area is 1.84 meters squared.    I/O last 3 completed shifts:  In: 9794 [I.V.:8027.8; Blood:156.3; NG/GT:360; IV Piggyback:1250]  Out: 08141 [Other:23120]    Physical Exam   Constitutional: He appears well-developed and well-nourished.   HENT:   Head: Normocephalic and atraumatic.   Neck: Neck supple. No tracheal deviation present.   Cardiovascular: Regular rhythm.  Tachycardia present.    Pulmonary/Chest: He has no wheezes. He has no rales.   Abdominal: Soft. He exhibits no distension.   Musculoskeletal: He exhibits no edema or deformity.   Skin: Skin is warm and dry. He is not diaphoretic.       Significant Labs:  ABGs:   Recent Labs  Lab 03/22/18  0408   PH 7.503*   PCO2 36.1   HCO3 28.4*   POCSATURATED 99   BE 5     CBC:   Recent Labs  Lab 03/22/18  0945   WBC 4.80   RBC 2.56*   HGB 7.2*   HCT 22.4*   PLT 85*   MCV 88   MCH 28.1   MCHC 32.1     CMP:   Recent Labs  Lab 03/22/18  0358   *  158*   CALCIUM 8.1*  8.1*   ALBUMIN 2.8*  2.8*   PROT 6.2     144   K 3.8  3.8   CO2 24  24     101   BUN  9  9   CREATININE 0.8  0.8   ALKPHOS 182*   ALT 43   AST 76*   BILITOT 2.4*     All labs within the past 24 hours have been reviewed.     Significant Imaging:  Labs: Reviewed    Assessment/Plan:     Acute renal failure superimposed on stage 4 chronic kidney disease    - baseline sCr 2.6-3.0 consistent with CKD stage IV  - anuric CACHORRO; likely ischemic ATN from prolonged pre-renal state (diarrhea) in setting of prograf renal vasoconstriction; cannot r/o septic ATN or Prograf toxicity  - SLED started 3/14  - remains anuric  - net negative 1.3L yesterday (negative 2.6L/48 hours). Minimal vent requirements. Hourly intake only 15cc/hr  - HAGMA due to elevated beta-hydroxybutyrate/starvation ketoacidosis. On tube feeds and insulin. Metabolic acidosis improved with high bicarb bath  - pH 7.5 this morning. Will decrease bicarb bath down to standard bath  - continue SLED today; -300cc/hr            Amairani Alegria, PGY-5  Nephrology Fellow  [facility]  Pager: [phone]

## 2018-03-22 NOTE — PROGRESS NOTES
[facility]  Infectious Disease  Progress Note    Patient Name: Lv Crocker  MRN: [id number]  Admission Date: 3/11/2018  Length of Stay: 10 days  Attending Physician: Jose A Lloyd MD  Primary Care Provider: Philippe Mohr MD    Isolation Status: No active isolations  Assessment/Plan:      * Acute respiratory failure with hypoxia    41yo man w/a history of DM1, Hashimoto's thyroiditis, and ICM (s/p OHT 11/18/2014, CMV D+/R+, steroid induction, on maintenance tacro/MMF/pred; c/b early hemorrhagic tamponade requiring washout, delayed closure, and pericardial window and subsequent AF w/RVR, and CACHORRO; late course c/b ABMR 4/2015 s/p rituxan, PLEX, and IVIG; subsequent C.diff/CMV reactivation, restrictive cardiomyopathy with recurrent pleural effusions s/p VATS/pleurex catheter 1/2018 with removal 2/2018 and ascites requiring repeated LVP, and CKD) who was admitted on 3/11/2018 with ~3wks of worsening profuse non-bloody diarrhea, associated cramping abdominal pain, N/V, and a week of acute on chronic cough, SOB, hypoxia requiring intubation, and fevers and was found to have multifocal pneumonia on CT chest due to RSV-A pneumonia with suspected superimposed bacterial pneumonia. He remains critically ill with a course c/b ongoing pressor requirement, hypoxic RF, CRRT requirement, and possible evolving DIC (of note, suspect CONS in blood cx is contaminant but will prove).     - completed ribavirin/IVIG 5 day course for RSV pneumonia  - discontinue vancomycin,  CONS on B/C 3/18/18 most likely is a contaminant, B/C repeated 3/21/18 NGTD  - all other cultures remains negative  - discontinue zosyn, has been adequately treated for HAP (total 9 days)  - discontinue empiric posaconazole, (Aspergillus Ag blood and BAL negative, Blasto urine Ag negative, Histo urine Ag negative)  - all other workup is negative so far  - continue to check CMV weekly during this episode of acute illness as he is at risk  of reactivation              Anticipated Disposition: monitor off antimicrobials, please let us know if something changes    Thank you for your consult. I will sign off. Please contact us if you have any additional questions.    Lawrence Dillard MD  Infectious Disease Fellow, PGY-5  Spectra: [phone]  Pager: [phone]  [facility]    Subjective:     Principal Problem:Acute respiratory failure with hypoxia    HPI: 43yo man w/a history of DM1, Hashimoto's thyroiditis, and ICM (s/p OHT 11/18/2014, CMV D+/R+, steroid induction, on maintenance tacro/MMF/pred; c/b early hemorrhagic tamponade requiring washout, delayed closure, and pericardial window and subsequent AF w/RVR, and CACHORRO; late course c/b ABMR 4/2015 s/p rituxan, PLEX, and IVIG; subsequent C.diff/CMV reactivation, restrictive cardiomyopathy with recurrent pleural effusions s/p VATS/pleurex catheter 1/2018 with removal 2/2018 and ascites requiring repeated LVP, and CKD) who was admitted on 3/11/2018 with ~3wks of worsening profuse non-bloody diarrhea, associated cramping abdominal pain, N/V, and a week of acute on chronic cough, SOB, hypoxia requiring intubation, and fevers and was found to have multifocal pneumonia on CT chest due to RSV-A pneumonia with suspected superimposed bacterial pneumonia. He remains critically ill with ongoing pressor requirement, hypoxic RF, CRRT requirement, and minimal hepatitis.  Interval History: patient was off vasopressors, continues on mechanical ventilation; Head CT negative (done because has no wake up yet), continues on CRRT    Review of Systems   Unable to perform ROS: Intubated     Objective:     Vital Signs (Most Recent):  Temp: 98.6 °F (37 °C) (03/22/18 1500)  Pulse: (!) 120 (03/22/18 1500)  Resp: (!) 28 (03/22/18 1430)  BP: 139/79 (03/22/18 0730)  SpO2: 100 % (03/22/18 1500) Vital Signs (24h Range):  Temp:  [97.5 °F (36.4 °C)-98.9 °F (37.2 °C)] 98.6 °F (37 °C)  Pulse:  [104-123] 120  Resp:  [22-31]  28  SpO2:  [95 %-100 %] 100 %  BP: (139)/(79) 139/79  Arterial Line BP: (111-148)/(53-71) 132/63     Weight: 71.5 kg (157 lb 10.1 oz)  Body mass index is 24.69 kg/m².    Estimated Creatinine Clearance: 110.2 mL/min (based on SCr of 0.8 mg/dL).    Physical Exam   Constitutional: He is intubated.   Eyes: No scleral icterus.   Cardiovascular: Normal rate and regular rhythm.    Pulmonary/Chest: He is intubated. He has no wheezes. He has no rales.   Abdominal: Soft. He exhibits no distension and no mass. There is no rebound.   Musculoskeletal: He exhibits no edema.   Lymphadenopathy:     He has no cervical adenopathy.   Skin: No rash noted. No erythema.       Significant Labs:   Bilirubin:   Recent Labs  Lab 03/13/18  2334  03/18/18  0411 03/19/18  0247 03/20/18  0438 03/21/18  0350 03/22/18  0358   BILIDIR 0.9*  --   --   --   --   --   --    BILITOT 1.1*  < > 2.1* 2.1* 2.6* 3.1* 2.4*   < > = values in this interval not displayed.  Blood Culture:     Recent Labs  Lab 03/13/18  1637 03/13/18  1701 03/18/18  0618 03/18/18  0748 03/21/18  0815   LABBLOO No growth after 5 days. No growth after 5 days. Gram stain aer bottle: Gram positive cocci in clusters resembling Staph   Results called to and read back by:Omid Cody RN 03/19/2018  10:55  COAGULASE-NEGATIVE STAPHYLOCOCCUS SPECIESOrganism is a probable contaminant No Growth to date  No Growth to date  No Growth to date  No Growth to date  No Growth to date No Growth to date  No Growth to date     BMP:     Recent Labs  Lab 03/22/18  0358 03/22/18  1358   *  158* 190*     144 142   K 3.8  3.8 4.0     101 102   CO2 24  24 20*   BUN 9  9 10   CREATININE 0.8  0.8 0.8   CALCIUM 8.1*  8.1* 8.5*   MG 1.9  1.9  --      CBC:     Recent Labs  Lab 03/21/18  1508 03/21/18  2343 03/22/18  0945   WBC 3.64* 3.87* 4.80   HGB 7.4* 7.1* 7.2*   HCT 22.3* 22.3* 22.4*   PLT 61* 78* 85*     CMP:   Recent Labs  Lab 03/21/18  0350  03/21/18  2148 03/22/18  0358  03/22/18  1358     144  < > 143 144  144 142   K 3.2*  3.2*  < > 3.8 3.8  3.8 4.0   CL 98  98  < > 99 101  101 102   CO2 19*  19*  < > 21* 24  24 20*   *  166*  < > 182* 158*  158* 190*   BUN 12  12  < > 11 9  9 10   CREATININE 1.0  1.0  < > 1.0 0.8  0.8 0.8   CALCIUM 8.5*  8.5*  < > 8.3* 8.1*  8.1* 8.5*   PROT 5.7*  --   --  6.2  --    ALBUMIN 2.6*  2.6*  < > 2.7* 2.8*  2.8* 2.8*   BILITOT 3.1*  --   --  2.4*  --    ALKPHOS 158*  --   --  182*  --    AST 92*  --   --  76*  --    ALT 46*  --   --  43  --    ANIONGAP 27*  27*  < > 23* 19*  19* 20*   EGFRNONAA >60.0  >60.0  < > >60.0 >60.0  >60.0 >60.0   < > = values in this interval not displayed.  Microbiology Results (last 7 days)     Procedure Component Value Units Date/Time    Blood culture [[id number]] Collected:  03/21/18 0815    Order Status:  Completed Specimen:  Blood from Peripheral, Forearm, Left Updated:  03/22/18 1013     Blood Culture, Routine No Growth to date     Blood Culture, Routine No Growth to date    Blood culture [[id number]] Collected:  03/18/18 0748    Order Status:  Completed Specimen:  Blood from Peripheral, Hand, Right Updated:  03/22/18 1012     Blood Culture, Routine No Growth to date     Blood Culture, Routine No Growth to date     Blood Culture, Routine No Growth to date     Blood Culture, Routine No Growth to date     Blood Culture, Routine No Growth to date    Blood culture [[id number]] Collected:  03/18/18 0618    Order Status:  Completed Specimen:  Blood from Peripheral, Hand, Left Updated:  03/21/18 1108     Blood Culture, Routine Gram stain aer bottle: Gram positive cocci in clusters resembling Staph      Blood Culture, Routine Results called to and read back by:Omid Cody RN 03/19/2018  10:55     Blood Culture, Routine --     COAGULASE-NEGATIVE STAPHYLOCOCCUS SPECIES  Organism is a probable contaminant      Blood culture [[id number]] Collected:  03/13/18 6204    Order Status:  Completed Specimen:   Blood from Peripheral, Antecubital, Left Updated:  03/18/18 2012     Blood Culture, Routine No growth after 5 days.    Blood culture [[id number]] Collected:  03/13/18 1701    Order Status:  Completed Specimen:  Blood from Peripheral, Forearm, Left Updated:  03/18/18 2012     Blood Culture, Routine No growth after 5 days.    Blood culture [[id number]]     Order Status:  Canceled Specimen:  Blood     Blood culture [[id number]]     Order Status:  Canceled Specimen:  Blood     Culture, Respiratory with Gram Stain [[id number]] Collected:  03/15/18 1541    Order Status:  Completed Specimen:  Respiratory from Sputum Updated:  03/17/18 0914     Respiratory Culture No growth     Gram Stain (Respiratory) <10 epithelial cells per low power field.     Gram Stain (Respiratory) Rare WBC's      Gram Stain (Respiratory) No organisms seen    Culture, Respiratory with Gram Stain [[id number]] Collected:  03/14/18 1137    Order Status:  Completed Specimen:  Respiratory from BAL, TRIPP Updated:  03/16/18 1046     Respiratory Culture No growth     Gram Stain (Respiratory) No WBC's     Gram Stain (Respiratory) No organisms seen    AFB Culture & Smear [[id number]] Collected:  03/14/18 1137    Order Status:  Completed Specimen:  Bronchial Wash from Amniotic Fluid Updated:  03/15/18 2127     AFB Culture & Smear Culture in progress     AFB CULTURE STAIN No acid fast bacilli seen.          Significant Imaging: I have reviewed all pertinent imaging results/findings within the past 24 hours.

## 2018-03-22 NOTE — ASSESSMENT & PLAN NOTE
-S/P Bronch and intubation 3/14. Cultures ngtd.   -RSV positive. Completed course of Ribavarin/IVIG. Per lung transplant protocol for severe RSV(given myelosuppression).   -Aspergill Ag neg from BAL.   -Urine histo/blasto antigens neg, crypto antigen neg, urine legionella neg, and Quantiferon pending.  -Appreciate ID help-->continue empiric vancomycin given GPCC(suspect contaminant but will await speciation). If repeat cultures negative , will stop today; if grows again, would exchange lines and continue therapy.  -Will stop zosyn. Sopped azithromycin.  -Continue empiric posaconazole for now(transitioned to per tube to avoid accumulation to cyclodextrin carrier)  -Stop IV GCV prophylaxis as patient is several years out from transplant without evidence of active CMV disease; will check weekly quants -Await pending tests including: final BAL cultures and Quantiferon   -Pulmonology managing vent. Precedex off since yesterday morning. Not following commands but responds to pain. Will get CT head.

## 2018-03-23 NOTE — PLAN OF CARE
Problem: Patient Care Overview  Goal: Plan of Care Review  Outcome: Ongoing (interventions implemented as appropriate)  No acute events throughout day. See vital signs and assessments in flowsheets. See below for updates on today's progress.     Pulmonary: Patient remains on mechanical ventilation, settings as follows : AC/40%/400/18/5+. Patient failed SBT due to tachypnea and hypertension. Will reassess in AM.    Cardiovascular: ST throughout shift 110-120s, BP WNL, CVP 7/8/8    Neurological: Patient starting to follow commands, very weak, but will wiggles toes and fingers when asked. PT/OT consulted.    Gastrointestinal: TF increased to 20/hr with goal of 50/hr. Tolerating well with little to no residuals. No BM.    Genitourinary: Anuric, CRRT SLED  unit 1350 due to line being switched out. Awaiting dialysis RN to place back on.    Endocrine: Accu checks q2 hours for insulin gtt management    Integumentary/Other: Bruising noted throughout. Right IJ switched out.    Infusions: insulin maintained, one unit of cryo given and one unit PRBCs given    Patient progressing towards goals as tolerated, plan of care communicated and reviewed with Lv Crocker and family. All concerns addressed. Will continue to monitor.

## 2018-03-23 NOTE — ASSESSMENT & PLAN NOTE
- TTE 3/12/18 showed LVEF 50-55% (but no substantial change when compared to previous echo), RV normal and IVC 8  - Had -ve DSE on 11/30/17  - IV Hydrocortisone decreased to 50 mg every 8 hours on 3/22/18 and Tacro resumed at 1 mg bid. Myfortic remains on hold

## 2018-03-23 NOTE — PLAN OF CARE
Problem: Patient Care Overview  Goal: Plan of Care Review  No acute events throughout night, remains on Insulin. CRRT. Remains intubated and grimaces only to pain.VS and assessment per flow sheet, patient progressing towards goals as tolerated, plan of care reviewed with Lv Crocker and family, all concerns addressed, will continue to monitor.

## 2018-03-23 NOTE — PROGRESS NOTES
[facility]  Heart Transplant  Progress Note    Patient Name: Lv Crocker  MRN: [id number]  Admission Date: 3/11/2018  Hospital Length of Stay: 11 days  Attending Physician: Jose A Lloyd MD  Primary Care Provider: Philippe Mohr MD  Principal Problem:Acute respiratory failure with hypoxia    Subjective:     Interval History: Intubated and off sedation. Follows commands today    Continuous Infusions:   sodium chloride 0.9% 200 mL/hr at 03/23/18 1200    sodium chloride 0.9%      dexmedetomidine (PRECEDEX) infusion Stopped (03/21/18 0750)    insulin (HUMAN R) infusion (adults) 0.6 Units/hr (03/23/18 1200)     Scheduled Meds:   chlorhexidine  15 mL Mouth/Throat BID    docusate  100 mg Per NG tube BID    famotidine (PF)  20 mg Intravenous Daily    hydrocortisone sodium succinate  50 mg Intravenous Q8H    levothyroxine  125 mcg Per NG tube Before breakfast    metoclopramide HCl  5 mg Intravenous Q6H    polyethylene glycol  17 g Per NG tube Daily    posaconazole 200 mg/5ml (40 mg/ml)  200 mg Per NG tube TID WM    sodium chloride 0.9%  3 mL Intravenous Q8H    tacrolimus  1 mg Oral BID     PRN Meds:sodium chloride, sodium chloride, sodium chloride, sodium chloride, sodium chloride, sodium chloride, sodium chloride, dextrose 50%, dextrose 50%, glucagon (human recombinant), glucose, glucose, k phos di & mono-sod phos mono    Review of patient's allergies indicates:   Allergen Reactions    No known drug allergies      Objective:     Vital Signs (Most Recent):  Temp: 98 °F (36.7 °C) (03/23/18 1200)  Pulse: (!) 115 (03/23/18 1200)  Resp: (!) 31 (03/23/18 1200)  BP: 135/64 (03/23/18 1032)  SpO2: 100 % (03/23/18 1200) Vital Signs (24h Range):  Temp:  [97.8 °F (36.6 °C)-99 °F (37.2 °C)] 98 °F (36.7 °C)  Pulse:  [114-125] 115  Resp:  [18-36] 31  SpO2:  [100 %] 100 %  BP: (130-135)/(60-64) 135/64  Arterial Line BP: (115-156)/(51-77) 117/54     Patient Vitals for the past 72 hrs (Last 3  readings):   Weight   03/22/18 1300 71.5 kg (157 lb 10.1 oz)   03/22/18 0526 71.5 kg (157 lb 10.1 oz)     Body mass index is 24.69 kg/m².      Intake/Output Summary (Last 24 hours) at 03/23/18 1241  Last data filed at 03/23/18 1200   Gross per 24 hour   Intake          5788.36 ml   Output             8311 ml   Net         -2522.64 ml       Hemodynamic Parameters:  CVP:  [7 mmHg-8 mmHg] 8 mmHg        Physical Exam   Constitutional: He appears well-developed and well-nourished.   Appears ill   HENT:   Head: Normocephalic and atraumatic.   Eyes: Conjunctivae are normal. Pupils are equal, round, and reactive to light.   Neck: No thyromegaly present.   RIJ trialysis   Cardiovascular: Regular rhythm.    Tachycardic   Pulmonary/Chest:   Intubated and ventilated  Breath sounds are slightly decreased right base. Essentially CTA bilaterally   Abdominal: Soft.   Hypoactive bowel sounds. + ascites   Musculoskeletal:   1-2+ BUE edema; no MITZY; no cyanosis   Neurological:   Intubated. Now follows commands   Skin: Skin is warm and dry. Capillary refill takes less than 2 seconds. There is pallor.       Significant Labs:  CBC:    Recent Labs  Lab 03/22/18  1609 03/22/18  2354 03/23/18  0759   WBC 5.97 7.87 8.11   RBC 2.43* 2.39* 2.32*   HGB 7.0* 6.9* 6.8*   HCT 21.3* 21.3* 21.4*   PLT 92* 104* 118*   MCV 88 89 92   MCH 28.8 28.9 29.3   MCHC 32.9 32.4 31.8*     BNP:  No results for input(s): BNP in the last 168 hours.    Invalid input(s): BNPTRIAGELBLO  CMP:    Recent Labs  Lab 03/21/18  0350  03/22/18  0358 03/22/18  1358 03/22/18  2200 03/23/18  0400   *  166*  < > 158*  158* 190* 149* 222*  222*   CALCIUM 8.5*  8.5*  < > 8.1*  8.1* 8.5* 8.3* 8.3*  8.3*   ALBUMIN 2.6*  2.6*  < > 2.8*  2.8* 2.8* 2.9* 2.8*  2.8*   PROT 5.7*  --  6.2  --   --  6.2     144  < > 144  144 142 143 144  144   K 3.2*  3.2*  < > 3.8  3.8 4.0 4.4 4.6  4.6   CO2 19*  19*  < > 24  24 20* 23 17*  17*   CL 98  98  < > 101  101 102  104 104  104   BUN 12  12  < > 9  9 10 4* 13  13   CREATININE 1.0  1.0  < > 0.8  0.8 0.8 0.5 1.0  1.0   ALKPHOS 158*  --  182*  --   --  201*   ALT 46*  --  43  --   --  37   AST 92*  --  76*  --   --  57*   BILITOT 3.1*  --  2.4*  --   --  2.3*   < > = values in this interval not displayed.   Coagulation:     Recent Labs  Lab 03/22/18  1609 03/22/18  2354 03/23/18  0759   INR 1.4* 1.4* 1.4*     LDH:  No results for input(s): LDH in the last 72 hours.  Microbiology:  Microbiology Results (last 7 days)     Procedure Component Value Units Date/Time    Blood culture [[id number]] Collected:  03/21/18 0815    Order Status:  Completed Specimen:  Blood from Peripheral, Forearm, Left Updated:  03/23/18 1012     Blood Culture, Routine No Growth to date     Blood Culture, Routine No Growth to date     Blood Culture, Routine No Growth to date    Blood culture [[id number]] Collected:  03/18/18 0748    Order Status:  Completed Specimen:  Blood from Peripheral, Hand, Right Updated:  03/23/18 1012     Blood Culture, Routine No growth after 5 days.    Blood culture [[id number]] Collected:  03/18/18 0618    Order Status:  Completed Specimen:  Blood from Peripheral, Hand, Left Updated:  03/21/18 1108     Blood Culture, Routine Gram stain aer bottle: Gram positive cocci in clusters resembling Staph      Blood Culture, Routine Results called to and read back by:Omid Cody RN 03/19/2018  10:55     Blood Culture, Routine --     COAGULASE-NEGATIVE STAPHYLOCOCCUS SPECIES  Organism is a probable contaminant      Blood culture [[id number]] Collected:  03/13/18 1637    Order Status:  Completed Specimen:  Blood from Peripheral, Antecubital, Left Updated:  03/18/18 2012     Blood Culture, Routine No growth after 5 days.    Blood culture [[id number]] Collected:  03/13/18 1701    Order Status:  Completed Specimen:  Blood from Peripheral, Forearm, Left Updated:  03/18/18 2012     Blood Culture, Routine No growth after 5 days.    Blood culture  [[id number]]     Order Status:  Canceled Specimen:  Blood     Blood culture [[id number]]     Order Status:  Canceled Specimen:  Blood     Culture, Respiratory with Gram Stain [[id number]] Collected:  03/15/18 1541    Order Status:  Completed Specimen:  Respiratory from Sputum Updated:  03/17/18 0914     Respiratory Culture No growth     Gram Stain (Respiratory) <10 epithelial cells per low power field.     Gram Stain (Respiratory) Rare WBC's      Gram Stain (Respiratory) No organisms seen          I have reviewed all pertinent labs within the past 24 hours.    Estimated Creatinine Clearance: 88.1 mL/min (based on SCr of 1 mg/dL).    Diagnostic Results:  I have reviewed all pertinent imaging results/findings within the past 24 hours.    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 Most recently has had ongoing issues with his lungs, had gotten 2 thoracenteses, after which he underwent VATS 01/19/18 followed by pleurex catheter placement and effusion drainage 01/11/18, subsequently had it removed 02/07/18 after drainage had decreased despite multiple attempts to drain it. Has been having significant coughing fits that result in him being near-syncopal at times, although difficult to say if he has passed out or not- patient most recently was admitted to the hospital for increased AGUILAR, coughing and pre-syncope 02/11-02/14/18 [place] [facility] [place].   Patient was started on antibiotics for suspected bacterial infection, with some diarrhea, bronchitis, and possible pneumonia. Ct chest showed some pleural fluid collection and after talking with Dr. James, we arranged for him to receive a diagnostic tap with IR, from which the fluid collection demonstrated no growth or significant findings at all really. Cell counts do not appear to have been  sent but will recheck. Patient states since his hospital stay, yesterday had a significant coughing fit again, after which he nearly passed out, is being seen in clinic today for this. Denies any cardiopulmonary complaints, no leg swelling, has some abdominal swelling, which is moderate for him. Denies significant shortness of breath with mild exertion, had 6MWT yesterday to see if he qualified for home O2, and his O2 sats actually improved after recovery from where he started initially. He and his wife admit he is in bed most days, not very active.     Mr. Crocker presented to the ED 3/11/18 with approximately 3 weeks of worsening diarrhea and 2-3 days of sinus pressure, drainage, and worsened cough.  He notes that he had a coughing fit last night and passed out, coughed throughout most of the night.  This also happened on 2/25/18.  He last saw Dr Ferreira in clinic on 2/20/18 where he was taken off myfortic and switched to cellcept (he hadn't been on cellcept because of leukopenia when they tried post-transplant).  Though his diarrhea had improved after his last discharge, he states it has increased now to 15x/day, clear/yellow/green, no fevers, no exacerbating foods per say.  He was taken back off the cellcept and placed back on myfortic this past week and has not noticed immediate change in diarrhea. He also reports his baseline coughing fits havent improved and are minimally responsive to tessalon pearls.  Came on last night, he lost consciousness during a fit once which is relatively normal for him, and had two more during HPI.  He notes no sick contacts but does state he's had some URI symptoms as above.  His baseline vitals are usually -120 and BP 90s/60s-110s/70s.  He reports a history of intermittent diarrhea - did have Cdiff many years ago but this smells different.  No abdominal pain, no nausea, no vomiting.  No blood in diarrhea.  Appears last c-scope in 2015 with no evidence of infection or  inflammatory processes.  He does report IBS which is different that this.       * Acute respiratory failure with hypoxia    -S/P Bronch and intubation 3/14. Cultures ngtd.   -RSV positive. Completed course of Ribavarin/IVIG. Per lung transplant protocol for severe RSV(given myelosuppression).   -Aspergill Ag neg from BAL.   -Urine histo/blasto antigens neg, crypto antigen neg, urine legionella neg, and Quantiferon pending.  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 Off sedation and now following commands        Ileus    -Had 2 BMs 3/20.  -No residuals now. Will advance tube feeds with goal 50 cc/hr  -Continue bowel regimen.        Restrictive cardiomyopathy    -S/P thoracentesis X 2, followed by VATS/pleurex cath placement (January 2018) with removal of pleurex (February 2018) and 3rd thoracentesis 2/14/18 with no significant findings on fluid removal. Moderate right pleural effusion stable by CXR 3/11/18 and chest CT 3/13/18  -Last paracentesis was in January. Abd US 3/12/18 confirmed ongoing ascites (not tense at all). Will consider getting paracentesis this admit          Heart transplanted    - TTE 3/12/18 showed LVEF 50-55% (but no substantial change when compared to previous echo), RV normal and IVC 8  - Had -ve DSE on 11/30/17  - IV Hydrocortisone decreased to 50 mg every 8 hours on 3/22/18 and Tacro resumed at 1 mg bid. Myfortic remains on hold        Acute renal failure superimposed on stage 4 chronic kidney disease    - Appreciate Neph Cx. Continue CRRT as tolerated.         Type 1 diabetes mellitus with stage 3 chronic kidney disease    -  Appreciate Endocrine's recs        Hypothyroidism due to Hashimoto's thyroiditis    - TSH low at 0.101 with normal FT4. Appreciate Endocrine's recs        Pancytopenia    - Appreciate Hem Cx.   -The patient developed thrombocytopenia 5 days after receiving enoxaparin and has a 4T score of 5 indicating a ~ 14% chance of HIT.  The patients HIT panel was positive with OD of 0.431 indicating a 1-5% chance of having HIT.  Would wait for HILDA before considering anticoagulation with argatroban. HILDA pending.    -The patients thrombocytopenia could be drug induced as well.   -The patient has a low fibrinogen, elevated INR, shistocytes on peripheral smear and elevated LDH which all point towards DIC. The haptoglobin is elevated which goes against hemolytic anemia,; however, haptoglobin is also an acute phase reactant.  As DIC is the higher on the differential would treat as such by treating underlying cause which is likely sepsis.       -Fibrinogen, INR and plt count every 8 hours.  -Give cryoprecipitate to get fibrinogen over 100.  Would give one unit at a time.  -Give FFP to get the INR below 1.8.  Would give at least two units at a time.  May consider giving the patient Vitamin K given recent use of coumadin.          Septic shock    -Off all pressors  -Continue stress dose hydrocortisone 50mg TID.  -Appreciate ID assistance. Abx adjustments as above.  -Will need to change out trialysis cath and art line today (both were placed 3/14/18)            Rita Louise NP [id number]  Heart Transplant  [facility]

## 2018-03-23 NOTE — PROCEDURES
"Lv Crocker is a 42 y.o. male patient.    Temp: 98 °F (36.7 °C) (03/23/18 1200)  Pulse: (!) 121 (03/23/18 1704)  Resp: (!) 33 (03/23/18 1704)  BP: 135/64 (03/23/18 1032)  SpO2: 100 % (03/23/18 1704)  Weight: 71.5 kg (157 lb 10.1 oz) (03/22/18 1300)  Height: 5' 7" (170.2 cm) (03/22/18 1300)    Central Line  Date/Time: 3/23/2018 5:19 PM  Performed by: KORY FRANKLIN  Consent Done: Yes  Indications: hemodialysis  Anesthesia: local infiltration    Anesthesia:  Local Anesthetic: lidocaine 1% without epinephrine  Preparation: skin prepped with ChloraPrep  Location details: right internal jugular  Catheter type: triple lumen  Ultrasound guidance: yes  Vessel Caliber: small, medium, not patent, compressibility normal  Vascular Doppler: not done  Needle advanced into vessel with real time Ultrasound guidance.  Guidewire confirmed in vessel.  Image recorded and saved.  Manometry: Yes  Number of attempts: 1  Assessment: placement verified by x-ray  Post-procedure: line sutured,  sterile dressing applied and chlorhexidine patch          No flowsheet data found.    Kory Franklin  3/23/2018  "

## 2018-03-23 NOTE — ASSESSMENT & PLAN NOTE
-Anemia is likely medication related.  -Haptoglobin checked and rules out hemolytic anemia  -Would continue to transfuse for hemoglobin above 7g/dL  -OZZY to assess for AIHA.

## 2018-03-23 NOTE — ASSESSMENT & PLAN NOTE
-S/P thoracentesis X 2, followed by VATS/pleurex cath placement (January 2018) with removal of pleurex (February 2018) and 3rd thoracentesis 2/14/18 with no significant findings on fluid removal. Moderate right pleural effusion stable by CXR 3/11/18 and chest CT 3/13/18  -Last paracentesis was in January. Abd US 3/12/18 confirmed ongoing ascites (not tense at all). Will consider getting paracentesis this admit

## 2018-03-23 NOTE — SUBJECTIVE & OBJECTIVE
Pt is a 41 yo M with h/o heart transplant admitted with hypoxic respiratory failure, ileus, ARF, pancytopenia, and septic shock.    Interval History:     Oncology Treatment Plan:   [No treatment plan]    Medications:  Continuous Infusions:   sodium chloride 0.9%      dexmedetomidine (PRECEDEX) infusion Stopped (03/21/18 0750)    insulin (HUMAN R) infusion (adults) 0.75 Units/hr (03/23/18 1356)     Scheduled Meds:   chlorhexidine  15 mL Mouth/Throat BID    docusate  100 mg Per NG tube BID    famotidine (PF)  20 mg Intravenous Daily    hydrocortisone sodium succinate  25 mg Intravenous Q8H    levothyroxine  125 mcg Per NG tube Before breakfast    metoclopramide HCl  5 mg Intravenous Q6H    polyethylene glycol  17 g Per NG tube Daily    posaconazole 200 mg/5ml (40 mg/ml)  200 mg Per NG tube TID WM    sodium chloride 0.9%  3 mL Intravenous Q8H    tacrolimus  2 mg Oral BID     PRN Meds:sodium chloride, dextrose 50%, dextrose 50%, glucagon (human recombinant), glucose, glucose, k phos di & mono-sod phos mono     Review of Systems   Unable to perform ROS: Intubated     Objective:     Vital Signs (Most Recent):  Temp: 98 °F (36.7 °C) (03/23/18 1200)  Pulse: (!) 118 (03/23/18 1510)  Resp: (!) 27 (03/23/18 1510)  BP: 135/64 (03/23/18 1032)  SpO2: 100 % (03/23/18 1510) Vital Signs (24h Range):  Temp:  [97.8 °F (36.6 °C)-99 °F (37.2 °C)] 98 °F (36.7 °C)  Pulse:  [114-125] 118  Resp:  [18-36] 27  SpO2:  [100 %] 100 %  BP: (130-135)/(60-64) 135/64  Arterial Line BP: (115-156)/(51-77) 137/65     Weight: 71.5 kg (157 lb 10.1 oz)  Body mass index is 24.69 kg/m².  Body surface area is 1.84 meters squared.      Intake/Output Summary (Last 24 hours) at 03/23/18 1553  Last data filed at 03/23/18 1350   Gross per 24 hour   Intake          5532.33 ml   Output             7703 ml   Net         -2170.67 ml       Physical Exam   Constitutional: He appears well-developed and well-nourished.   HENT:   Head: Normocephalic and  atraumatic.   ET tube in place   Eyes: Right eye exhibits no discharge. Left eye exhibits no discharge. No scleral icterus.   Cardiovascular: Normal rate, regular rhythm, normal heart sounds and intact distal pulses.  Exam reveals no gallop and no friction rub.    No murmur heard.  Pulmonary/Chest: Breath sounds normal. No respiratory distress. He has no wheezes. He has no rales. He exhibits no tenderness.   Auscultated anteriorly.   Abdominal: Soft. Bowel sounds are normal. He exhibits no distension and no mass. There is no tenderness. There is no rebound.   Musculoskeletal: Normal range of motion. He exhibits edema. He exhibits no tenderness.   Neurological:   Sedated   Skin: Skin is warm and dry. No rash noted. No erythema.   1st, 2nd and 3rd toes on right with blue/purpleish hue        Significant Labs:   Recent Results (from the past 24 hour(s))   POCT glucose    Collection Time: 03/22/18  4:06 PM   Result Value Ref Range    POCT Glucose 187 (H) 70 - 110 mg/dL   Fibrinogen    Collection Time: 03/22/18  4:09 PM   Result Value Ref Range    Fibrinogen 91 (LL) 182 - 366 mg/dL   Protime-INR    Collection Time: 03/22/18  4:09 PM   Result Value Ref Range    Prothrombin Time 13.6 (H) 9.0 - 12.5 sec    INR 1.4 (H) 0.8 - 1.2   CBC auto differential    Collection Time: 03/22/18  4:09 PM   Result Value Ref Range    WBC 5.97 3.90 - 12.70 K/uL    RBC 2.43 (L) 4.60 - 6.20 M/uL    Hemoglobin 7.0 (L) 14.0 - 18.0 g/dL    Hematocrit 21.3 (L) 40.0 - 54.0 %    MCV 88 82 - 98 fL    MCH 28.8 27.0 - 31.0 pg    MCHC 32.9 32.0 - 36.0 g/dL    RDW 17.6 (H) 11.5 - 14.5 %    Platelets 92 (L) 150 - 350 K/uL    MPV 11.7 9.2 - 12.9 fL    Immature Granulocytes CANCELED 0.0 - 0.5 %    Immature Grans (Abs) CANCELED 0.00 - 0.04 K/uL    nRBC 0 0 /100 WBC    Gran% 99.0 (H) 38.0 - 73.0 %    Lymph% 1.0 (L) 18.0 - 48.0 %    Mono% 0.0 (L) 4.0 - 15.0 %    Eosinophil% 0.0 0.0 - 8.0 %    Basophil% 0.0 0.0 - 1.9 %    Platelet Estimate Decreased (A)      Aniso Slight     Poik Moderate     Poly Occasional     Ovalocytes Occasional     Target Cells Occasional     Zulay Cells Occasional     Schistocytes Present     Large/Giant Platelets Present     Differential Method Manual    Magnesium    Collection Time: 03/22/18  4:09 PM   Result Value Ref Range    Magnesium 1.9 1.6 - 2.6 mg/dL   POCT glucose    Collection Time: 03/22/18  6:32 PM   Result Value Ref Range    POCT Glucose 163 (H) 70 - 110 mg/dL   POCT glucose    Collection Time: 03/22/18  7:50 PM   Result Value Ref Range    POCT Glucose 110 70 - 110 mg/dL   POCT glucose    Collection Time: 03/22/18  9:58 PM   Result Value Ref Range    POCT Glucose 138 (H) 70 - 110 mg/dL   Renal function panel    Collection Time: 03/22/18 10:00 PM   Result Value Ref Range    Glucose 149 (H) 70 - 110 mg/dL    Sodium 143 136 - 145 mmol/L    Potassium 4.4 3.5 - 5.1 mmol/L    Chloride 104 95 - 110 mmol/L    CO2 23 23 - 29 mmol/L    BUN, Bld 4 (L) 6 - 20 mg/dL    Calcium 8.3 (L) 8.7 - 10.5 mg/dL    Creatinine 0.5 0.5 - 1.4 mg/dL    Albumin 2.9 (L) 3.5 - 5.2 g/dL    Phosphorus 1.9 (L) 2.7 - 4.5 mg/dL    eGFR if African American >60.0 >60 mL/min/1.73 m^2    eGFR if non African American >60.0 >60 mL/min/1.73 m^2    Anion Gap 16 8 - 16 mmol/L   Fibrinogen    Collection Time: 03/22/18 11:54 PM   Result Value Ref Range    Fibrinogen 77 (LL) 182 - 366 mg/dL   Protime-INR    Collection Time: 03/22/18 11:54 PM   Result Value Ref Range    Prothrombin Time 13.9 (H) 9.0 - 12.5 sec    INR 1.4 (H) 0.8 - 1.2   CBC auto differential    Collection Time: 03/22/18 11:54 PM   Result Value Ref Range    WBC 7.87 3.90 - 12.70 K/uL    RBC 2.39 (L) 4.60 - 6.20 M/uL    Hemoglobin 6.9 (L) 14.0 - 18.0 g/dL    Hematocrit 21.3 (L) 40.0 - 54.0 %    MCV 89 82 - 98 fL    MCH 28.9 27.0 - 31.0 pg    MCHC 32.4 32.0 - 36.0 g/dL    RDW 17.7 (H) 11.5 - 14.5 %    Platelets 104 (L) 150 - 350 K/uL    MPV 12.6 9.2 - 12.9 fL    Immature Granulocytes CANCELED 0.0 - 0.5 %    Immature  Grans (Abs) CANCELED 0.00 - 0.04 K/uL    Lymph # CANCELED 1.0 - 4.8 K/uL    Mono # CANCELED 0.3 - 1.0 K/uL    Eos # CANCELED 0.0 - 0.5 K/uL    Baso # CANCELED 0.00 - 0.20 K/uL    nRBC 0 0 /100 WBC    Gran% 95.0 (H) 38.0 - 73.0 %    Lymph% 2.0 (L) 18.0 - 48.0 %    Mono% 3.0 (L) 4.0 - 15.0 %    Eosinophil% 0.0 0.0 - 8.0 %    Basophil% 0.0 0.0 - 1.9 %    Platelet Estimate Decreased (A)     Aniso Moderate     Poik Slight     Poly Occasional     Hypo Occasional     Ovalocytes Occasional     Cropwell Cells Occasional     Schistocytes Present     Basophilic Stippling Occasional     Large/Giant Platelets Present     Differential Method Manual    Magnesium    Collection Time: 03/22/18 11:54 PM   Result Value Ref Range    Magnesium 1.9 1.6 - 2.6 mg/dL   POCT glucose    Collection Time: 03/22/18 11:57 PM   Result Value Ref Range    POCT Glucose 214 (H) 70 - 110 mg/dL   POCT glucose    Collection Time: 03/23/18  1:51 AM   Result Value Ref Range    POCT Glucose 181 (H) 70 - 110 mg/dL   POCT glucose    Collection Time: 03/23/18  3:47 AM   Result Value Ref Range    POCT Glucose 217 (H) 70 - 110 mg/dL   ISTAT PROCEDURE    Collection Time: 03/23/18  3:49 AM   Result Value Ref Range    POC PH 7.413 7.35 - 7.45    POC PCO2 26.1 (LL) 35 - 45 mmHg    POC PO2 148 (H) 80 - 100 mmHg    POC HCO3 16.6 (L) 24 - 28 mmol/L    POC BE -8 -2 to 2 mmol/L    POC SATURATED O2 99 95 - 100 %    POC TCO2 17 (L) 23 - 27 mmol/L    Rate 18     Sample ARTERIAL     Site Ignacio/UAC     Allens Test N/A     DelSys Adult Vent     Mode AC/PRVC     Vt 420     PEEP 5     FiO2 40     Sp02 99    Comprehensive metabolic panel    Collection Time: 03/23/18  4:00 AM   Result Value Ref Range    Sodium 144 136 - 145 mmol/L    Potassium 4.6 3.5 - 5.1 mmol/L    Chloride 104 95 - 110 mmol/L    CO2 17 (L) 23 - 29 mmol/L    Glucose 222 (H) 70 - 110 mg/dL    BUN, Bld 13 6 - 20 mg/dL    Creatinine 1.0 0.5 - 1.4 mg/dL    Calcium 8.3 (L) 8.7 - 10.5 mg/dL    Total Protein 6.2 6.0 - 8.4  g/dL    Albumin 2.8 (L) 3.5 - 5.2 g/dL    Total Bilirubin 2.3 (H) 0.1 - 1.0 mg/dL    Alkaline Phosphatase 201 (H) 55 - 135 U/L    AST 57 (H) 10 - 40 U/L    ALT 37 10 - 44 U/L    Anion Gap 23 (H) 8 - 16 mmol/L    eGFR if African American >60.0 >60 mL/min/1.73 m^2    eGFR if non African American >60.0 >60 mL/min/1.73 m^2   Magnesium    Collection Time: 03/23/18  4:00 AM   Result Value Ref Range    Magnesium 1.8 1.6 - 2.6 mg/dL   Tacrolimus level    Collection Time: 03/23/18  4:00 AM   Result Value Ref Range    Tacrolimus Lvl 2.2 (L) 5.0 - 15.0 ng/mL   Renal function panel    Collection Time: 03/23/18  4:00 AM   Result Value Ref Range    Glucose 222 (H) 70 - 110 mg/dL    Sodium 144 136 - 145 mmol/L    Potassium 4.6 3.5 - 5.1 mmol/L    Chloride 104 95 - 110 mmol/L    CO2 17 (L) 23 - 29 mmol/L    BUN, Bld 13 6 - 20 mg/dL    Calcium 8.3 (L) 8.7 - 10.5 mg/dL    Creatinine 1.0 0.5 - 1.4 mg/dL    Albumin 2.8 (L) 3.5 - 5.2 g/dL    Phosphorus 3.8 2.7 - 4.5 mg/dL    eGFR if African American >60.0 >60 mL/min/1.73 m^2    eGFR if non African American >60.0 >60 mL/min/1.73 m^2    Anion Gap 23 (H) 8 - 16 mmol/L   ISTAT PROCEDURE    Collection Time: 03/23/18  4:03 AM   Result Value Ref Range    POC PH 7.321 (L) 7.35 - 7.45    POC PCO2 39.6 35 - 45 mmHg    POC PO2 42 40 - 60 mmHg    POC HCO3 20.4 (L) 24 - 28 mmol/L    POC BE -6 -2 to 2 mmol/L    POC SATURATED O2 74 (L) 95 - 100 %    POC TCO2 22 (L) 24 - 29 mmol/L    Rate 18     Sample VENOUS     Site Other     Allens Test N/A     DelSys Adult Vent     Mode AC/PRVC     Vt 420     PEEP 5     FiO2 40     Sp02 100    POCT glucose    Collection Time: 03/23/18  5:57 AM   Result Value Ref Range    POCT Glucose 213 (H) 70 - 110 mg/dL   Fibrinogen    Collection Time: 03/23/18  7:59 AM   Result Value Ref Range    Fibrinogen 75 (LL) 182 - 366 mg/dL   Protime-INR    Collection Time: 03/23/18  7:59 AM   Result Value Ref Range    Prothrombin Time 14.1 (H) 9.0 - 12.5 sec    INR 1.4 (H) 0.8 - 1.2    CBC auto differential    Collection Time: 03/23/18  7:59 AM   Result Value Ref Range    WBC 8.11 3.90 - 12.70 K/uL    RBC 2.32 (L) 4.60 - 6.20 M/uL    Hemoglobin 6.8 (L) 14.0 - 18.0 g/dL    Hematocrit 21.4 (L) 40.0 - 54.0 %    MCV 92 82 - 98 fL    MCH 29.3 27.0 - 31.0 pg    MCHC 31.8 (L) 32.0 - 36.0 g/dL    RDW 18.3 (H) 11.5 - 14.5 %    Platelets 118 (L) 150 - 350 K/uL    MPV 11.8 9.2 - 12.9 fL    Immature Granulocytes CANCELED 0.0 - 0.5 %    Immature Grans (Abs) CANCELED 0.00 - 0.04 K/uL    Lymph # CANCELED 1.0 - 4.8 K/uL    Mono # CANCELED 0.3 - 1.0 K/uL    Eos # CANCELED 0.0 - 0.5 K/uL    Baso # CANCELED 0.00 - 0.20 K/uL    nRBC 0 0 /100 WBC    Gran% 92.0 (H) 38.0 - 73.0 %    Lymph% 1.0 (L) 18.0 - 48.0 %    Mono% 5.0 4.0 - 15.0 %    Eosinophil% 0.0 0.0 - 8.0 %    Basophil% 0.0 0.0 - 1.9 %    Bands 2.0 %    Platelet Estimate Decreased (A)     Aniso Slight     Poik Slight     Poly Occasional     Hypo Occasional     Ovalocytes Occasional     Differential Method Manual    Magnesium    Collection Time: 03/23/18  7:59 AM   Result Value Ref Range    Magnesium 1.9 1.6 - 2.6 mg/dL   POCT glucose    Collection Time: 03/23/18  8:03 AM   Result Value Ref Range    POCT Glucose 164 (H) 70 - 110 mg/dL   POCT glucose    Collection Time: 03/23/18  9:59 AM   Result Value Ref Range    POCT Glucose 141 (H) 70 - 110 mg/dL   Beta - Hydroxybutyrate, Serum    Collection Time: 03/23/18 10:06 AM   Result Value Ref Range    Beta-Hydroxybutyrate 5.7 (H) 0.0 - 0.5 mmol/L   POCT glucose    Collection Time: 03/23/18 11:56 AM   Result Value Ref Range    POCT Glucose 199 (H) 70 - 110 mg/dL   Renal function panel    Collection Time: 03/23/18  1:15 PM   Result Value Ref Range    Glucose 191 (H) 70 - 110 mg/dL    Sodium 144 136 - 145 mmol/L    Potassium 4.6 3.5 - 5.1 mmol/L    Chloride 105 95 - 110 mmol/L    CO2 17 (L) 23 - 29 mmol/L    BUN, Bld 10 6 - 20 mg/dL    Calcium 8.4 (L) 8.7 - 10.5 mg/dL    Creatinine 0.8 0.5 - 1.4 mg/dL    Albumin 2.9  (L) 3.5 - 5.2 g/dL    Phosphorus 3.5 2.7 - 4.5 mg/dL    eGFR if African American >60.0 >60 mL/min/1.73 m^2    eGFR if non African American >60.0 >60 mL/min/1.73 m^2    Anion Gap 22 (H) 8 - 16 mmol/L   POCT glucose    Collection Time: 03/23/18  1:54 PM   Result Value Ref Range    POCT Glucose 195 (H) 70 - 110 mg/dL       Microbiology Results (last 7 days)     Procedure Component Value Units Date/Time    Blood culture [[id number]] Collected:  03/21/18 0815    Order Status:  Completed Specimen:  Blood from Peripheral, Forearm, Left Updated:  03/23/18 1012     Blood Culture, Routine No Growth to date     Blood Culture, Routine No Growth to date     Blood Culture, Routine No Growth to date    Blood culture [[id number]] Collected:  03/18/18 0748    Order Status:  Completed Specimen:  Blood from Peripheral, Hand, Right Updated:  03/23/18 1012     Blood Culture, Routine No growth after 5 days.    Blood culture [[id number]] Collected:  03/18/18 0618    Order Status:  Completed Specimen:  Blood from Peripheral, Hand, Left Updated:  03/21/18 1108     Blood Culture, Routine Gram stain aer bottle: Gram positive cocci in clusters resembling Staph      Blood Culture, Routine Results called to and read back by:Omid Cody RN 03/19/2018  10:55     Blood Culture, Routine --     COAGULASE-NEGATIVE STAPHYLOCOCCUS SPECIES  Organism is a probable contaminant      Blood culture [[id number]] Collected:  03/13/18 1637    Order Status:  Completed Specimen:  Blood from Peripheral, Antecubital, Left Updated:  03/18/18 2012     Blood Culture, Routine No growth after 5 days.    Blood culture [[id number]] Collected:  03/13/18 1701    Order Status:  Completed Specimen:  Blood from Peripheral, Forearm, Left Updated:  03/18/18 2012     Blood Culture, Routine No growth after 5 days.    Blood culture [[id number]]     Order Status:  Canceled Specimen:  Blood     Blood culture [[id number]]     Order Status:  Canceled Specimen:  Blood     Culture, Respiratory  with Gram Stain [[id number]] Collected:  03/15/18 1541    Order Status:  Completed Specimen:  Respiratory from Sputum Updated:  03/17/18 0914     Respiratory Culture No growth     Gram Stain (Respiratory) <10 epithelial cells per low power field.     Gram Stain (Respiratory) Rare WBC's      Gram Stain (Respiratory) No organisms seen            Diagnostic Results:  I have reviewed all pertinent imaging results/findings within the past 24 hours.

## 2018-03-23 NOTE — PROGRESS NOTES
[facility]  Hematology/Oncology  Progress Note    Patient Name: Lv Crocker  Admission Date: 3/11/2018  Hospital Length of Stay: 11 days  Code Status: Full Code     Subjective:     HPI:  Pt is a 43 yo M with h/o hashimoto's thyroidits, DM1 on insulin, s/p OHTX 11/14, CKD who initially presented to the hospital with 3 week h/o worsening diarrhea, cough which led to a syncopal event.  After being hospitalized, the patient underwent an EGD and Colonoscopy on 3/13/18 to assess the cause of diarrhea.  The patient subsequently developed hypercapnic and hypoxic respiratory failure and was intubated on 3/14/18.  The patient currently is being treated for respiratory failure, septic shock with ARDS and RSV infection, and renal failure.  The patient has been persistently anemic since his heart transplant in 2014 and has had worsening anemia during admission.  The patient has developed thrombocytopenia starting 3/17/18 during his hospitalization.  Currently the patient is intubated and unable;e to provide ROS.         Pt is a 43 yo M with h/o heart transplant admitted with hypoxic respiratory failure, ileus, ARF, pancytopenia, and septic shock.    Interval History:     Oncology Treatment Plan:   [No treatment plan]    Medications:  Continuous Infusions:   sodium chloride 0.9%      dexmedetomidine (PRECEDEX) infusion Stopped (03/21/18 0750)    insulin (HUMAN R) infusion (adults) 0.75 Units/hr (03/23/18 1356)     Scheduled Meds:   chlorhexidine  15 mL Mouth/Throat BID    docusate  100 mg Per NG tube BID    famotidine (PF)  20 mg Intravenous Daily    hydrocortisone sodium succinate  25 mg Intravenous Q8H    levothyroxine  125 mcg Per NG tube Before breakfast    metoclopramide HCl  5 mg Intravenous Q6H    polyethylene glycol  17 g Per NG tube Daily    posaconazole 200 mg/5ml (40 mg/ml)  200 mg Per NG tube TID WM    sodium chloride 0.9%  3 mL Intravenous Q8H    tacrolimus  2 mg Oral BID      PRN Meds:sodium chloride, dextrose 50%, dextrose 50%, glucagon (human recombinant), glucose, glucose, k phos di & mono-sod phos mono     Review of Systems   Unable to perform ROS: Intubated     Objective:     Vital Signs (Most Recent):  Temp: 98 °F (36.7 °C) (03/23/18 1200)  Pulse: (!) 118 (03/23/18 1510)  Resp: (!) 27 (03/23/18 1510)  BP: 135/64 (03/23/18 1032)  SpO2: 100 % (03/23/18 1510) Vital Signs (24h Range):  Temp:  [97.8 °F (36.6 °C)-99 °F (37.2 °C)] 98 °F (36.7 °C)  Pulse:  [114-125] 118  Resp:  [18-36] 27  SpO2:  [100 %] 100 %  BP: (130-135)/(60-64) 135/64  Arterial Line BP: (115-156)/(51-77) 137/65     Weight: 71.5 kg (157 lb 10.1 oz)  Body mass index is 24.69 kg/m².  Body surface area is 1.84 meters squared.      Intake/Output Summary (Last 24 hours) at 03/23/18 1553  Last data filed at 03/23/18 1350   Gross per 24 hour   Intake          5532.33 ml   Output             7703 ml   Net         -2170.67 ml       Physical Exam   Constitutional: He appears well-developed and well-nourished.   HENT:   Head: Normocephalic and atraumatic.   ET tube in place   Eyes: Right eye exhibits no discharge. Left eye exhibits no discharge. No scleral icterus.   Cardiovascular: Normal rate, regular rhythm, normal heart sounds and intact distal pulses.  Exam reveals no gallop and no friction rub.    No murmur heard.  Pulmonary/Chest: Breath sounds normal. No respiratory distress. He has no wheezes. He has no rales. He exhibits no tenderness.   Auscultated anteriorly.   Abdominal: Soft. Bowel sounds are normal. He exhibits no distension and no mass. There is no tenderness. There is no rebound.   Musculoskeletal: Normal range of motion. He exhibits edema. He exhibits no tenderness.   Neurological:   Sedated   Skin: Skin is warm and dry. No rash noted. No erythema.   1st, 2nd and 3rd toes on right with blue/purpleish hue        Significant Labs:   Recent Results (from the past 24 hour(s))   POCT glucose    Collection Time:  03/22/18  4:06 PM   Result Value Ref Range    POCT Glucose 187 (H) 70 - 110 mg/dL   Fibrinogen    Collection Time: 03/22/18  4:09 PM   Result Value Ref Range    Fibrinogen 91 (LL) 182 - 366 mg/dL   Protime-INR    Collection Time: 03/22/18  4:09 PM   Result Value Ref Range    Prothrombin Time 13.6 (H) 9.0 - 12.5 sec    INR 1.4 (H) 0.8 - 1.2   CBC auto differential    Collection Time: 03/22/18  4:09 PM   Result Value Ref Range    WBC 5.97 3.90 - 12.70 K/uL    RBC 2.43 (L) 4.60 - 6.20 M/uL    Hemoglobin 7.0 (L) 14.0 - 18.0 g/dL    Hematocrit 21.3 (L) 40.0 - 54.0 %    MCV 88 82 - 98 fL    MCH 28.8 27.0 - 31.0 pg    MCHC 32.9 32.0 - 36.0 g/dL    RDW 17.6 (H) 11.5 - 14.5 %    Platelets 92 (L) 150 - 350 K/uL    MPV 11.7 9.2 - 12.9 fL    Immature Granulocytes CANCELED 0.0 - 0.5 %    Immature Grans (Abs) CANCELED 0.00 - 0.04 K/uL    nRBC 0 0 /100 WBC    Gran% 99.0 (H) 38.0 - 73.0 %    Lymph% 1.0 (L) 18.0 - 48.0 %    Mono% 0.0 (L) 4.0 - 15.0 %    Eosinophil% 0.0 0.0 - 8.0 %    Basophil% 0.0 0.0 - 1.9 %    Platelet Estimate Decreased (A)     Aniso Slight     Poik Moderate     Poly Occasional     Ovalocytes Occasional     Target Cells Occasional     Zulay Cells Occasional     Schistocytes Present     Large/Giant Platelets Present     Differential Method Manual    Magnesium    Collection Time: 03/22/18  4:09 PM   Result Value Ref Range    Magnesium 1.9 1.6 - 2.6 mg/dL   POCT glucose    Collection Time: 03/22/18  6:32 PM   Result Value Ref Range    POCT Glucose 163 (H) 70 - 110 mg/dL   POCT glucose    Collection Time: 03/22/18  7:50 PM   Result Value Ref Range    POCT Glucose 110 70 - 110 mg/dL   POCT glucose    Collection Time: 03/22/18  9:58 PM   Result Value Ref Range    POCT Glucose 138 (H) 70 - 110 mg/dL   Renal function panel    Collection Time: 03/22/18 10:00 PM   Result Value Ref Range    Glucose 149 (H) 70 - 110 mg/dL    Sodium 143 136 - 145 mmol/L    Potassium 4.4 3.5 - 5.1 mmol/L    Chloride 104 95 - 110 mmol/L    CO2  23 23 - 29 mmol/L    BUN, Bld 4 (L) 6 - 20 mg/dL    Calcium 8.3 (L) 8.7 - 10.5 mg/dL    Creatinine 0.5 0.5 - 1.4 mg/dL    Albumin 2.9 (L) 3.5 - 5.2 g/dL    Phosphorus 1.9 (L) 2.7 - 4.5 mg/dL    eGFR if African American >60.0 >60 mL/min/1.73 m^2    eGFR if non African American >60.0 >60 mL/min/1.73 m^2    Anion Gap 16 8 - 16 mmol/L   Fibrinogen    Collection Time: 03/22/18 11:54 PM   Result Value Ref Range    Fibrinogen 77 (LL) 182 - 366 mg/dL   Protime-INR    Collection Time: 03/22/18 11:54 PM   Result Value Ref Range    Prothrombin Time 13.9 (H) 9.0 - 12.5 sec    INR 1.4 (H) 0.8 - 1.2   CBC auto differential    Collection Time: 03/22/18 11:54 PM   Result Value Ref Range    WBC 7.87 3.90 - 12.70 K/uL    RBC 2.39 (L) 4.60 - 6.20 M/uL    Hemoglobin 6.9 (L) 14.0 - 18.0 g/dL    Hematocrit 21.3 (L) 40.0 - 54.0 %    MCV 89 82 - 98 fL    MCH 28.9 27.0 - 31.0 pg    MCHC 32.4 32.0 - 36.0 g/dL    RDW 17.7 (H) 11.5 - 14.5 %    Platelets 104 (L) 150 - 350 K/uL    MPV 12.6 9.2 - 12.9 fL    Immature Granulocytes CANCELED 0.0 - 0.5 %    Immature Grans (Abs) CANCELED 0.00 - 0.04 K/uL    Lymph # CANCELED 1.0 - 4.8 K/uL    Mono # CANCELED 0.3 - 1.0 K/uL    Eos # CANCELED 0.0 - 0.5 K/uL    Baso # CANCELED 0.00 - 0.20 K/uL    nRBC 0 0 /100 WBC    Gran% 95.0 (H) 38.0 - 73.0 %    Lymph% 2.0 (L) 18.0 - 48.0 %    Mono% 3.0 (L) 4.0 - 15.0 %    Eosinophil% 0.0 0.0 - 8.0 %    Basophil% 0.0 0.0 - 1.9 %    Platelet Estimate Decreased (A)     Aniso Moderate     Poik Slight     Poly Occasional     Hypo Occasional     Ovalocytes Occasional     Zulay Cells Occasional     Schistocytes Present     Basophilic Stippling Occasional     Large/Giant Platelets Present     Differential Method Manual    Magnesium    Collection Time: 03/22/18 11:54 PM   Result Value Ref Range    Magnesium 1.9 1.6 - 2.6 mg/dL   POCT glucose    Collection Time: 03/22/18 11:57 PM   Result Value Ref Range    POCT Glucose 214 (H) 70 - 110 mg/dL   POCT glucose    Collection Time:  03/23/18  1:51 AM   Result Value Ref Range    POCT Glucose 181 (H) 70 - 110 mg/dL   POCT glucose    Collection Time: 03/23/18  3:47 AM   Result Value Ref Range    POCT Glucose 217 (H) 70 - 110 mg/dL   ISTAT PROCEDURE    Collection Time: 03/23/18  3:49 AM   Result Value Ref Range    POC PH 7.413 7.35 - 7.45    POC PCO2 26.1 (LL) 35 - 45 mmHg    POC PO2 148 (H) 80 - 100 mmHg    POC HCO3 16.6 (L) 24 - 28 mmol/L    POC BE -8 -2 to 2 mmol/L    POC SATURATED O2 99 95 - 100 %    POC TCO2 17 (L) 23 - 27 mmol/L    Rate 18     Sample ARTERIAL     Site Ignacio/UAC     Allens Test N/A     DelSys Adult Vent     Mode AC/PRVC     Vt 420     PEEP 5     FiO2 40     Sp02 99    Comprehensive metabolic panel    Collection Time: 03/23/18  4:00 AM   Result Value Ref Range    Sodium 144 136 - 145 mmol/L    Potassium 4.6 3.5 - 5.1 mmol/L    Chloride 104 95 - 110 mmol/L    CO2 17 (L) 23 - 29 mmol/L    Glucose 222 (H) 70 - 110 mg/dL    BUN, Bld 13 6 - 20 mg/dL    Creatinine 1.0 0.5 - 1.4 mg/dL    Calcium 8.3 (L) 8.7 - 10.5 mg/dL    Total Protein 6.2 6.0 - 8.4 g/dL    Albumin 2.8 (L) 3.5 - 5.2 g/dL    Total Bilirubin 2.3 (H) 0.1 - 1.0 mg/dL    Alkaline Phosphatase 201 (H) 55 - 135 U/L    AST 57 (H) 10 - 40 U/L    ALT 37 10 - 44 U/L    Anion Gap 23 (H) 8 - 16 mmol/L    eGFR if African American >60.0 >60 mL/min/1.73 m^2    eGFR if non African American >60.0 >60 mL/min/1.73 m^2   Magnesium    Collection Time: 03/23/18  4:00 AM   Result Value Ref Range    Magnesium 1.8 1.6 - 2.6 mg/dL   Tacrolimus level    Collection Time: 03/23/18  4:00 AM   Result Value Ref Range    Tacrolimus Lvl 2.2 (L) 5.0 - 15.0 ng/mL   Renal function panel    Collection Time: 03/23/18  4:00 AM   Result Value Ref Range    Glucose 222 (H) 70 - 110 mg/dL    Sodium 144 136 - 145 mmol/L    Potassium 4.6 3.5 - 5.1 mmol/L    Chloride 104 95 - 110 mmol/L    CO2 17 (L) 23 - 29 mmol/L    BUN, Bld 13 6 - 20 mg/dL    Calcium 8.3 (L) 8.7 - 10.5 mg/dL    Creatinine 1.0 0.5 - 1.4 mg/dL     Albumin 2.8 (L) 3.5 - 5.2 g/dL    Phosphorus 3.8 2.7 - 4.5 mg/dL    eGFR if African American >60.0 >60 mL/min/1.73 m^2    eGFR if non African American >60.0 >60 mL/min/1.73 m^2    Anion Gap 23 (H) 8 - 16 mmol/L   ISTAT PROCEDURE    Collection Time: 03/23/18  4:03 AM   Result Value Ref Range    POC PH 7.321 (L) 7.35 - 7.45    POC PCO2 39.6 35 - 45 mmHg    POC PO2 42 40 - 60 mmHg    POC HCO3 20.4 (L) 24 - 28 mmol/L    POC BE -6 -2 to 2 mmol/L    POC SATURATED O2 74 (L) 95 - 100 %    POC TCO2 22 (L) 24 - 29 mmol/L    Rate 18     Sample VENOUS     Site Other     Allens Test N/A     DelSys Adult Vent     Mode AC/PRVC     Vt 420     PEEP 5     FiO2 40     Sp02 100    POCT glucose    Collection Time: 03/23/18  5:57 AM   Result Value Ref Range    POCT Glucose 213 (H) 70 - 110 mg/dL   Fibrinogen    Collection Time: 03/23/18  7:59 AM   Result Value Ref Range    Fibrinogen 75 (LL) 182 - 366 mg/dL   Protime-INR    Collection Time: 03/23/18  7:59 AM   Result Value Ref Range    Prothrombin Time 14.1 (H) 9.0 - 12.5 sec    INR 1.4 (H) 0.8 - 1.2   CBC auto differential    Collection Time: 03/23/18  7:59 AM   Result Value Ref Range    WBC 8.11 3.90 - 12.70 K/uL    RBC 2.32 (L) 4.60 - 6.20 M/uL    Hemoglobin 6.8 (L) 14.0 - 18.0 g/dL    Hematocrit 21.4 (L) 40.0 - 54.0 %    MCV 92 82 - 98 fL    MCH 29.3 27.0 - 31.0 pg    MCHC 31.8 (L) 32.0 - 36.0 g/dL    RDW 18.3 (H) 11.5 - 14.5 %    Platelets 118 (L) 150 - 350 K/uL    MPV 11.8 9.2 - 12.9 fL    Immature Granulocytes CANCELED 0.0 - 0.5 %    Immature Grans (Abs) CANCELED 0.00 - 0.04 K/uL    Lymph # CANCELED 1.0 - 4.8 K/uL    Mono # CANCELED 0.3 - 1.0 K/uL    Eos # CANCELED 0.0 - 0.5 K/uL    Baso # CANCELED 0.00 - 0.20 K/uL    nRBC 0 0 /100 WBC    Gran% 92.0 (H) 38.0 - 73.0 %    Lymph% 1.0 (L) 18.0 - 48.0 %    Mono% 5.0 4.0 - 15.0 %    Eosinophil% 0.0 0.0 - 8.0 %    Basophil% 0.0 0.0 - 1.9 %    Bands 2.0 %    Platelet Estimate Decreased (A)     Aniso Slight     Poik Slight     Poly  Occasional     Hypo Occasional     Ovalocytes Occasional     Differential Method Manual    Magnesium    Collection Time: 03/23/18  7:59 AM   Result Value Ref Range    Magnesium 1.9 1.6 - 2.6 mg/dL   POCT glucose    Collection Time: 03/23/18  8:03 AM   Result Value Ref Range    POCT Glucose 164 (H) 70 - 110 mg/dL   POCT glucose    Collection Time: 03/23/18  9:59 AM   Result Value Ref Range    POCT Glucose 141 (H) 70 - 110 mg/dL   Beta - Hydroxybutyrate, Serum    Collection Time: 03/23/18 10:06 AM   Result Value Ref Range    Beta-Hydroxybutyrate 5.7 (H) 0.0 - 0.5 mmol/L   POCT glucose    Collection Time: 03/23/18 11:56 AM   Result Value Ref Range    POCT Glucose 199 (H) 70 - 110 mg/dL   Renal function panel    Collection Time: 03/23/18  1:15 PM   Result Value Ref Range    Glucose 191 (H) 70 - 110 mg/dL    Sodium 144 136 - 145 mmol/L    Potassium 4.6 3.5 - 5.1 mmol/L    Chloride 105 95 - 110 mmol/L    CO2 17 (L) 23 - 29 mmol/L    BUN, Bld 10 6 - 20 mg/dL    Calcium 8.4 (L) 8.7 - 10.5 mg/dL    Creatinine 0.8 0.5 - 1.4 mg/dL    Albumin 2.9 (L) 3.5 - 5.2 g/dL    Phosphorus 3.5 2.7 - 4.5 mg/dL    eGFR if African American >60.0 >60 mL/min/1.73 m^2    eGFR if non African American >60.0 >60 mL/min/1.73 m^2    Anion Gap 22 (H) 8 - 16 mmol/L   POCT glucose    Collection Time: 03/23/18  1:54 PM   Result Value Ref Range    POCT Glucose 195 (H) 70 - 110 mg/dL       Microbiology Results (last 7 days)     Procedure Component Value Units Date/Time    Blood culture [[id number]] Collected:  03/21/18 0815    Order Status:  Completed Specimen:  Blood from Peripheral, Forearm, Left Updated:  03/23/18 1012     Blood Culture, Routine No Growth to date     Blood Culture, Routine No Growth to date     Blood Culture, Routine No Growth to date    Blood culture [[id number]] Collected:  03/18/18 0748    Order Status:  Completed Specimen:  Blood from Peripheral, Hand, Right Updated:  03/23/18 1012     Blood Culture, Routine No growth after 5 days.     Blood culture [[id number]] Collected:  03/18/18 0618    Order Status:  Completed Specimen:  Blood from Peripheral, Hand, Left Updated:  03/21/18 1108     Blood Culture, Routine Gram stain aer bottle: Gram positive cocci in clusters resembling Staph      Blood Culture, Routine Results called to and read back by:Omid Cody RN 03/19/2018  10:55     Blood Culture, Routine --     COAGULASE-NEGATIVE STAPHYLOCOCCUS SPECIES  Organism is a probable contaminant      Blood culture [[id number]] Collected:  03/13/18 1637    Order Status:  Completed Specimen:  Blood from Peripheral, Antecubital, Left Updated:  03/18/18 2012     Blood Culture, Routine No growth after 5 days.    Blood culture [[id number]] Collected:  03/13/18 1701    Order Status:  Completed Specimen:  Blood from Peripheral, Forearm, Left Updated:  03/18/18 2012     Blood Culture, Routine No growth after 5 days.    Blood culture [[id number]]     Order Status:  Canceled Specimen:  Blood     Blood culture [[id number]]     Order Status:  Canceled Specimen:  Blood     Culture, Respiratory with Gram Stain [[id number]] Collected:  03/15/18 1541    Order Status:  Completed Specimen:  Respiratory from Sputum Updated:  03/17/18 0914     Respiratory Culture No growth     Gram Stain (Respiratory) <10 epithelial cells per low power field.     Gram Stain (Respiratory) Rare WBC's      Gram Stain (Respiratory) No organisms seen            Diagnostic Results:  I have reviewed all pertinent imaging results/findings within the past 24 hours.    Assessment/Plan:     Thrombocytopenia    -The patient's thrombocytopenia has been improving.  -HILDA from 3/19/18 is negative indicating the patient does not have HIT  -Schistocytes are still seen on peripheral smear which, along with low fibrinogen and elevated INR, is still concerning for DIC.  -The patient is currently on posaconazole which can also cause thrombocytopenia.  -Prograf can also cause presence of schistocytes.  -Would check  fibrinogen, INR and plt count every 12 hours.  -Would give cryoprecipitate to get fibrinogen over 100.  Would give one unit at a time.  -Would give FFP to get the INR below 1.8.  Would give at least two units at a time.  May consider giving the patient Vitamin K given recent use of coumadin.  -Would give a single unit of platelets at a time to get platelets above 50k.        Anemia    -Anemia is likely medication related.  -Haptoglobin checked and rules out hemolytic anemia  -Would continue to transfuse for hemoglobin above 7g/dL  -OZZY to assess for AIHA.            -Discussed with staff    Clovis Mcbride MD  Hematology/Oncology  [facility]

## 2018-03-23 NOTE — PROGRESS NOTES
[facility]  Pulm/Critical Care - Medicine  Progress Note    Patient Name: Lv Crocker  MRN: [id number]  Admission Date: 3/11/2018  Hospital Length of Stay: 11 days  Code Status: Full Code  Attending Provider: Jose A Lloyd MD  Primary Care Provider: Philippe Mohr MD   Principal Problem: Acute respiratory failure with hypoxia    Subjective:   Waking up and following commands today. No fevers overnight  Review of Systems   Unable to obtain  Objective:     Vital Signs (Most Recent):  Temp: 98 °F (36.7 °C) (03/23/18 1200)  Pulse: (!) 118 (03/23/18 1510)  Resp: (!) 27 (03/23/18 1510)  BP: 135/64 (03/23/18 1032)  SpO2: 100 % (03/23/18 1510) Vital Signs (24h Range):  Temp:  [97.8 °F (36.6 °C)-99 °F (37.2 °C)] 98 °F (36.7 °C)  Pulse:  [114-125] 118  Resp:  [18-36] 27  SpO2:  [93 %-100 %] 100 %  BP: (130-135)/(60-64) 135/64  Arterial Line BP: (115-158)/(51-83) 135/63     Weight: 71.5 kg (157 lb 10.1 oz)  Body mass index is 24.69 kg/m².      Intake/Output Summary (Last 24 hours) at 03/23/18 1604  Last data filed at 03/23/18 1500   Gross per 24 hour   Intake          5307.64 ml   Output             7367 ml   Net         -2059.36 ml       Physical Exam   Constitutional: He appears well-developed and well-nourished.    mechanical ventilation. Off sedation opens eyes and follows most commands    HENT:   Head: Normocephalic and atraumatic.   Neck: Neck supple. No tracheal deviation present. No thyromegaly present.   Cardiovascular:   tachycardic   Pulmonary/Chest: Effort normal.   On the vent    Abdominal: Soft.   Musculoskeletal: He exhibits no edema.   Neurological:   Awake and follows some commands   Skin: Skin is warm and dry.       Vents:  Vent Mode: A/C (03/23/18 1510)  Ventilator Initiated: Yes (03/14/18 0932)  Set Rate: 18 bmp (03/23/18 1510)  Vt Set: 420 mL (03/23/18 1510)  Pressure Support: 0 cmH20 (03/22/18 0909)  PEEP/CPAP: 5 cmH20 (03/23/18 1510)  Oxygen Concentration (%): 40 (03/23/18  1510)  Peak Airway Pressure: 28 cmH2O (03/23/18 1510)  Plateau Pressure: 30 cmH20 (03/22/18 0749)  Total Ve: 14.2 mL (03/23/18 1510)  F/VT Ratio<105 (RSBI): (!) 55.79 (03/23/18 1510)    Lines/Drains/Airways     Central Venous Catheter Line                 Trialysis (Dialysis) Catheter 03/23/18 1433 right internal jugular less than 1 day          Drain                 NG/OG Tube 03/14/18 1300 [place] [facility] mouth 9 days          Airway                 Airway - Non-Surgical 03/14/18 0930 Endotracheal Tube 9 days          Arterial Line                 Arterial Line 03/14/18 1600 Right Femoral 9 days          Peripheral Intravenous Line                 Midline Catheter Insertion/Assessment  - Single Lumen 03/17/18 1454 Left cephalic vein (lateral side of arm) 18g x 8cm 6 days         Peripheral IV - Single Lumen 03/20/18 1640 Right Forearm 2 days                Significant Labs:    CBC/Anemia Profile:    Recent Labs  Lab 03/22/18  1609 03/22/18  2354 03/23/18  0759   WBC 5.97 7.87 8.11   HGB 7.0* 6.9* 6.8*   HCT 21.3* 21.3* 21.4*   PLT 92* 104* 118*   MCV 88 89 92   RDW 17.6* 17.7* 18.3*        Chemistries:    Recent Labs  Lab 03/22/18  0358  03/22/18  2200 03/22/18  2354 03/23/18  0400 03/23/18  0759 03/23/18  1315     144  < > 143  --  144  144  --  144   K 3.8  3.8  < > 4.4  --  4.6  4.6  --  4.6     101  < > 104  --  104  104  --  105   CO2 24  24  < > 23  --  17*  17*  --  17*   BUN 9  9  < > 4*  --  13  13  --  10   CREATININE 0.8  0.8  < > 0.5  --  1.0  1.0  --  0.8   CALCIUM 8.1*  8.1*  < > 8.3*  --  8.3*  8.3*  --  8.4*   ALBUMIN 2.8*  2.8*  < > 2.9*  --  2.8*  2.8*  --  2.9*   PROT 6.2  --   --   --  6.2  --   --    BILITOT 2.4*  --   --   --  2.3*  --   --    ALKPHOS 182*  --   --   --  201*  --   --    ALT 43  --   --   --  37  --   --    AST 76*  --   --   --  57*  --   --    MG 1.9  1.9  < >  --  1.9 1.8 1.9  --    PHOS 1.8*  < > 1.9*  --  3.8  --  3.5   < > = values in this  interval not displayed.        Assessment/Plan:   1. ARDS  2. CACHORRO  3. DIC      42 year-old male with a PMH of a heart transplant who was admitted to the hospital on 3/11 for diarrhea and cough. He was intubated on 5/14 for hypoxemic and hypercapnic resp failure. His CXR has improved. Off sedation, finally waking up more today. Plan:    -Failed SBT today. Keep off all sedation  -LTV ventilation.   -Need PT to start while still on the vent  -CRRT  -Off all abs.  -DVT prophylaxis      Failed SBT. Will re-do SBT in am. Keep off all sedation.    Geri Stringer MD  Critical Care - Medicine  [facility]

## 2018-03-23 NOTE — ASSESSMENT & PLAN NOTE
-Had 2 BMs 3/20.  -No residuals now. Will advance tube feeds with goal 50 cc/hr  -Continue bowel regimen.

## 2018-03-23 NOTE — ASSESSMENT & PLAN NOTE
-The patient's thrombocytopenia has been improving.  -HILDA from 3/19/18 is negative indicating the patient does not have HIT  -Schistocytes are still seen on peripheral smear which, along with low fibrinogen and elevated INR, is still concerning for DIC.  -The patient is currently on posaconazole which can also cause thrombocytopenia.  -Prograf can also cause presence of schistocytes.  -Would check fibrinogen, INR and plt count every 12 hours.  -Would give cryoprecipitate to get fibrinogen over 100.  Would give one unit at a time.  -Would give FFP to get the INR below 1.8.  Would give at least two units at a time.  May consider giving the patient Vitamin K given recent use of coumadin.  -Would give a single unit of platelets at a time to get platelets above 50k.

## 2018-03-23 NOTE — ASSESSMENT & PLAN NOTE
"- baseline sCr 2.6-3.0 consistent with CKD stage IV  - anuric CACHORRO; likely ischemic ATN from prolonged pre-renal state (diarrhea) in setting of prograf renal vasoconstriction; cannot r/o septic ATN or Prograf toxicity  - SLED started 3/14  - remains anuric  - net negative 1.7L yesterday. CXR improved, though with "moderate edema"  - still with AGMA, beta-OH remains elevated. Tube feeds being advanced today.   - continue SLED for metabolic clearance and volume management. Will plan to run continuously until patient extubated; then will attempt to wean to 12-hour treatments  "

## 2018-03-23 NOTE — ASSESSMENT & PLAN NOTE
BG goal 140-180  Continue intensive insulin gtt q2hr checks while remains intubated.     If BG continues to drop while on insulin rate 0.1u/hr, will need to add D5 IVF to be able to continue continuous insulin gtt in pt with T1DM. Do not suspend gtt >1 hr, pt is T1DM.    Low c peptide with glc 164. Treat as type 1.   Neg MODE 2013, neg islet cell ab on this admission.   Cannot order insulin ab while on insulin, and ZnT8 unavailable in labs.

## 2018-03-23 NOTE — SUBJECTIVE & OBJECTIVE
"Interval HPI:   Overnight events:  Remains intubated and non responsive off sedation.   On crrt per renal  On abx per ID   BG at goal on intensive with minimal requirement    /64   Pulse (!) 120   Temp 98.3 °F (36.8 °C) (Axillary)   Resp 18   Ht 5' 7" (1.702 m)   Wt 71.5 kg (157 lb 10.1 oz)   SpO2 100%   BMI 24.69 kg/m²     Labs Reviewed and Include      Recent Labs  Lab 03/23/18  0400   *  222*   CALCIUM 8.3*  8.3*   ALBUMIN 2.8*  2.8*   PROT 6.2     144   K 4.6  4.6   CO2 17*  17*     104   BUN 13  13   CREATININE 1.0  1.0   ALKPHOS 201*   ALT 37   AST 57*   BILITOT 2.3*     Lab Results   Component Value Date    WBC 8.11 03/23/2018    HGB 6.8 (L) 03/23/2018    HCT 21.4 (L) 03/23/2018    MCV 92 03/23/2018     (L) 03/23/2018     No results for input(s): TSH, FREET4 in the last 168 hours.  Lab Results   Component Value Date    HGBA1C 6.9 (H) 02/11/2018       Nutritional status:   Body mass index is 24.69 kg/m².  Lab Results   Component Value Date    ALBUMIN 2.8 (L) 03/23/2018    ALBUMIN 2.8 (L) 03/23/2018    ALBUMIN 2.9 (L) 03/22/2018     Lab Results   Component Value Date    PREALBUMIN 5 (L) 03/15/2018    PREALBUMIN 18 (L) 03/24/2015    PREALBUMIN 19 (L) 12/17/2014       Estimated Creatinine Clearance: 88.1 mL/min (based on SCr of 1 mg/dL).    Accu-Checks  Recent Labs      03/22/18   1606  03/22/18   1832  03/22/18   1950  03/22/18   2158  03/22/18   2357  03/23/18   0151  03/23/18   0347  03/23/18   0557  03/23/18   0803  03/23/18   0959   POCTGLUCOSE  187*  163*  110  138*  214*  181*  217*  213*  164*  141*       Current Medications and/or Treatments Impacting Glycemic Control  Immunotherapy:  Immunosuppressants         Stop Route Frequency     tacrolimus capsule 1 mg      -- Oral 2 times daily        Steroids:   Hormones     Start     Stop Route Frequency Ordered    03/22/18 2200  hydrocortisone sodium succinate injection 50 mg      -- IV Every 8 hours 03/22/18 " 1456        Pressors:    Autonomic Drugs     None        Hyperglycemia/Diabetes Medications: Antihyperglycemics     Start     Stop Route Frequency Ordered    03/14/18 1630  insulin regular (Humulin R) 100 Units in sodium chloride 0.9% 100 mL infusion     Question:  Insulin Rate Adjustment (DO NOT MODIFY ANSWER)  Answer:  [email or web address] [id number]    -- IV Continuous 03/14/18 1539

## 2018-03-23 NOTE — PROGRESS NOTES
"[facility]  Endocrinology  Progress Note    Admit Date: 3/11/2018     Reason for Consult: abnormal TFTs    Surgical Procedure and Date: s/p heart transplant 2014     Diabetes diagnosis year: 5yo (T1DM)     Home Diabetes Medications:    Levemir 15u qHS  Novolog 4u AC     How often checking glucose at home? 4 times daily   BG readings on regimen: 150-200s  Hypoglycemia on the regimen?  Yes, rarely - treats appropriately (light snack, glucose tab)  Missed doses on regimen?  No        Diabetes Complications include:     Hyperglycemia, Hypoglycemia  and Diabetic chronic kidney disease          Complicating diabetes co morbidities:   N/A     HPI:   Patient is a 42 y.o. male with a diagnosis of T1DM, HTN, hypothyroidism, s/p heart transplant.  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 He required serial paracentesis and thoracentesis until he underwent a VATS with pleurX catheter placement on 1/11/2018 and removed 2/7/18.       Presented to hospital secondary to diarrhea and cough.  Upon initial labs, TSH was decreased (0.101) and free T4 1.33.  Endocrinology consulted to assist in management of these labs.  He was last seen in clinic by Kamala Vázquez NP 11/2017.   Previous hospitalization, endocrine consulted for same problem.  During that visit, recommended decreasing LT4 to 125mcg daily. Unfortunately, patient was discharged on previous dose of 150mcg daily.     Interval HPI:   Overnight events:  Remains intubated and non responsive off sedation.   On crrt per renal  On abx per ID   BG at goal on intensive with minimal requirement    /64   Pulse (!) 120   Temp 98.3 °F (36.8 °C) (Axillary)   Resp 18   Ht 5' 7" (1.702 m)   Wt 71.5 kg (157 lb 10.1 oz)   SpO2 100%   BMI 24.69 kg/m²       Labs Reviewed and Include      Recent Labs  Lab 03/23/18  0400   *  222*   CALCIUM 8.3*  " 8.3*   ALBUMIN 2.8*  2.8*   PROT 6.2     144   K 4.6  4.6   CO2 17*  17*     104   BUN 13  13   CREATININE 1.0  1.0   ALKPHOS 201*   ALT 37   AST 57*   BILITOT 2.3*     Lab Results   Component Value Date    WBC 8.11 03/23/2018    HGB 6.8 (L) 03/23/2018    HCT 21.4 (L) 03/23/2018    MCV 92 03/23/2018     (L) 03/23/2018     No results for input(s): TSH, FREET4 in the last 168 hours.  Lab Results   Component Value Date    HGBA1C 6.9 (H) 02/11/2018       Nutritional status:   Body mass index is 24.69 kg/m².  Lab Results   Component Value Date    ALBUMIN 2.8 (L) 03/23/2018    ALBUMIN 2.8 (L) 03/23/2018    ALBUMIN 2.9 (L) 03/22/2018     Lab Results   Component Value Date    PREALBUMIN 5 (L) 03/15/2018    PREALBUMIN 18 (L) 03/24/2015    PREALBUMIN 19 (L) 12/17/2014       Estimated Creatinine Clearance: 88.1 mL/min (based on SCr of 1 mg/dL).    Accu-Checks  Recent Labs      03/22/18   1606  03/22/18   1832  03/22/18   1950  03/22/18   2158  03/22/18   2357  03/23/18   0151  03/23/18   0347  03/23/18   0557  03/23/18   0803  03/23/18   0959   POCTGLUCOSE  187*  163*  110  138*  214*  181*  217*  213*  164*  141*       Current Medications and/or Treatments Impacting Glycemic Control  Immunotherapy:  Immunosuppressants         Stop Route Frequency     tacrolimus capsule 1 mg      -- Oral 2 times daily        Steroids:   Hormones     Start     Stop Route Frequency Ordered    03/22/18 2200  hydrocortisone sodium succinate injection 50 mg      -- IV Every 8 hours 03/22/18 1456        Pressors:    Autonomic Drugs     None        Hyperglycemia/Diabetes Medications: Antihyperglycemics     Start     Stop Route Frequency Ordered    03/14/18 1630  insulin regular (Humulin R) 100 Units in sodium chloride 0.9% 100 mL infusion     Question:  Insulin Rate Adjustment (DO NOT MODIFY ANSWER)  Answer:  [email or web address] [email or web address]    -- IV Continuous 03/14/18 1533           ASSESSMENT and PLAN    Type 1 diabetes mellitus with hyperglycemia    BG goal 140-180  Continue intensive insulin gtt q2hr checks while remains intubated.     If BG continues to drop while on insulin rate 0.1u/hr, will need to add D5 IVF to be able to continue continuous insulin gtt in pt with T1DM. Do not suspend gtt >1 hr, pt is T1DM.    Low c peptide with glc 164. Treat as type 1.   Neg MODE 2013, neg islet cell ab on this admission.   Cannot order insulin ab while on insulin, and ZnT8 unavailable in labs.            On enteral nutrition     May adversely impact bg                      Hypothyroidism due to Hashimoto's thyroiditis     Abnormal TFTs upon admit.  Unclear if secondary to illness, but with evidence of iatrogenic hyperthyroidism in past while on above weight based dose.      Continue LT4 ng 125mcg daily (decreased from 150mcg)  Repeat TFTs in 4 weeks (due ~4/15)             Acute renal failure superimposed on stage 4 chronic kidney disease     Avoid insulin stacking             Heart transplanted     Per transplant  Avoid hypoglycemia             Current use of steroid medication     Chronic steroids PDN 2.5mg o/p.   Agree with stress dose steroids during critical illness              Palmira Dorsey MD  Endocrinology  [facility]

## 2018-03-23 NOTE — SUBJECTIVE & OBJECTIVE
Interval History: Intubated and off sedation. Follows commands today    Continuous Infusions:   sodium chloride 0.9% 200 mL/hr at 03/23/18 1200    sodium chloride 0.9%      dexmedetomidine (PRECEDEX) infusion Stopped (03/21/18 0750)    insulin (HUMAN R) infusion (adults) 0.6 Units/hr (03/23/18 1200)     Scheduled Meds:   chlorhexidine  15 mL Mouth/Throat BID    docusate  100 mg Per NG tube BID    famotidine (PF)  20 mg Intravenous Daily    hydrocortisone sodium succinate  50 mg Intravenous Q8H    levothyroxine  125 mcg Per NG tube Before breakfast    metoclopramide HCl  5 mg Intravenous Q6H    polyethylene glycol  17 g Per NG tube Daily    posaconazole 200 mg/5ml (40 mg/ml)  200 mg Per NG tube TID WM    sodium chloride 0.9%  3 mL Intravenous Q8H    tacrolimus  1 mg Oral BID     PRN Meds:sodium chloride, sodium chloride, sodium chloride, sodium chloride, sodium chloride, sodium chloride, sodium chloride, dextrose 50%, dextrose 50%, glucagon (human recombinant), glucose, glucose, k phos di & mono-sod phos mono    Review of patient's allergies indicates:   Allergen Reactions    No known drug allergies      Objective:     Vital Signs (Most Recent):  Temp: 98 °F (36.7 °C) (03/23/18 1200)  Pulse: (!) 115 (03/23/18 1200)  Resp: (!) 31 (03/23/18 1200)  BP: 135/64 (03/23/18 1032)  SpO2: 100 % (03/23/18 1200) Vital Signs (24h Range):  Temp:  [97.8 °F (36.6 °C)-99 °F (37.2 °C)] 98 °F (36.7 °C)  Pulse:  [114-125] 115  Resp:  [18-36] 31  SpO2:  [100 %] 100 %  BP: (130-135)/(60-64) 135/64  Arterial Line BP: (115-156)/(51-77) 117/54     Patient Vitals for the past 72 hrs (Last 3 readings):   Weight   03/22/18 1300 71.5 kg (157 lb 10.1 oz)   03/22/18 0526 71.5 kg (157 lb 10.1 oz)     Body mass index is 24.69 kg/m².      Intake/Output Summary (Last 24 hours) at 03/23/18 1241  Last data filed at 03/23/18 1200   Gross per 24 hour   Intake          5788.36 ml   Output             8311 ml   Net         -2522.64 ml        Hemodynamic Parameters:  CVP:  [7 mmHg-8 mmHg] 8 mmHg        Physical Exam   Constitutional: He appears well-developed and well-nourished.   Appears ill   HENT:   Head: Normocephalic and atraumatic.   Eyes: Conjunctivae are normal. Pupils are equal, round, and reactive to light.   Neck: No thyromegaly present.   RIJ trialysis   Cardiovascular: Regular rhythm.    Tachycardic   Pulmonary/Chest:   Intubated and ventilated  Breath sounds are slightly decreased right base. Essentially CTA bilaterally   Abdominal: Soft.   Hypoactive bowel sounds. + ascites   Musculoskeletal:   1-2+ BUE edema; no MITZY; no cyanosis   Neurological:   Intubated. Now follows commands   Skin: Skin is warm and dry. Capillary refill takes less than 2 seconds. There is pallor.       Significant Labs:  CBC:    Recent Labs  Lab 03/22/18  1609 03/22/18  2354 03/23/18  0759   WBC 5.97 7.87 8.11   RBC 2.43* 2.39* 2.32*   HGB 7.0* 6.9* 6.8*   HCT 21.3* 21.3* 21.4*   PLT 92* 104* 118*   MCV 88 89 92   MCH 28.8 28.9 29.3   MCHC 32.9 32.4 31.8*     BNP:  No results for input(s): BNP in the last 168 hours.    Invalid input(s): BNPTRIAGELBLO  CMP:    Recent Labs  Lab 03/21/18  0350  03/22/18  0358 03/22/18  1358 03/22/18  2200 03/23/18  0400   *  166*  < > 158*  158* 190* 149* 222*  222*   CALCIUM 8.5*  8.5*  < > 8.1*  8.1* 8.5* 8.3* 8.3*  8.3*   ALBUMIN 2.6*  2.6*  < > 2.8*  2.8* 2.8* 2.9* 2.8*  2.8*   PROT 5.7*  --  6.2  --   --  6.2     144  < > 144  144 142 143 144  144   K 3.2*  3.2*  < > 3.8  3.8 4.0 4.4 4.6  4.6   CO2 19*  19*  < > 24  24 20* 23 17*  17*   CL 98  98  < > 101  101 102 104 104  104   BUN 12  12  < > 9  9 10 4* 13  13   CREATININE 1.0  1.0  < > 0.8  0.8 0.8 0.5 1.0  1.0   ALKPHOS 158*  --  182*  --   --  201*   ALT 46*  --  43  --   --  37   AST 92*  --  76*  --   --  57*   BILITOT 3.1*  --  2.4*  --   --  2.3*   < > = values in this interval not displayed.   Coagulation:     Recent Labs  Lab  03/22/18  1609 03/22/18  2354 03/23/18  0759   INR 1.4* 1.4* 1.4*     LDH:  No results for input(s): LDH in the last 72 hours.  Microbiology:  Microbiology Results (last 7 days)     Procedure Component Value Units Date/Time    Blood culture [[id number]] Collected:  03/21/18 0815    Order Status:  Completed Specimen:  Blood from Peripheral, Forearm, Left Updated:  03/23/18 1012     Blood Culture, Routine No Growth to date     Blood Culture, Routine No Growth to date     Blood Culture, Routine No Growth to date    Blood culture [[id number]] Collected:  03/18/18 0748    Order Status:  Completed Specimen:  Blood from Peripheral, Hand, Right Updated:  03/23/18 1012     Blood Culture, Routine No growth after 5 days.    Blood culture [[id number]] Collected:  03/18/18 0618    Order Status:  Completed Specimen:  Blood from Peripheral, Hand, Left Updated:  03/21/18 1108     Blood Culture, Routine Gram stain aer bottle: Gram positive cocci in clusters resembling Staph      Blood Culture, Routine Results called to and read back by:Omid Cody RN 03/19/2018  10:55     Blood Culture, Routine --     COAGULASE-NEGATIVE STAPHYLOCOCCUS SPECIES  Organism is a probable contaminant      Blood culture [[id number]] Collected:  03/13/18 1637    Order Status:  Completed Specimen:  Blood from Peripheral, Antecubital, Left Updated:  03/18/18 2012     Blood Culture, Routine No growth after 5 days.    Blood culture [[id number]] Collected:  03/13/18 1701    Order Status:  Completed Specimen:  Blood from Peripheral, Forearm, Left Updated:  03/18/18 2012     Blood Culture, Routine No growth after 5 days.    Blood culture [[id number]]     Order Status:  Canceled Specimen:  Blood     Blood culture [[id number]]     Order Status:  Canceled Specimen:  Blood     Culture, Respiratory with Gram Stain [[id number]] Collected:  03/15/18 1541    Order Status:  Completed Specimen:  Respiratory from Sputum Updated:  03/17/18 0914     Respiratory Culture No growth      Gram Stain (Respiratory) <10 epithelial cells per low power field.     Gram Stain (Respiratory) Rare WBC's      Gram Stain (Respiratory) No organisms seen          I have reviewed all pertinent labs within the past 24 hours.    Estimated Creatinine Clearance: 88.1 mL/min (based on SCr of 1 mg/dL).    Diagnostic Results:  I have reviewed all pertinent imaging results/findings within the past 24 hours.

## 2018-03-23 NOTE — ASSESSMENT & PLAN NOTE
-Off all pressors  -Continue stress dose hydrocortisone 50mg TID.  -Appreciate ID assistance. Abx adjustments as above.  -Will need to change out trialysis cath and art line today (both were placed 3/14/18)

## 2018-03-23 NOTE — ASSESSMENT & PLAN NOTE
-S/P Bronch and intubation 3/14. Cultures ngtd.   -RSV positive. Completed course of Ribavarin/IVIG. Per lung transplant protocol for severe RSV(given myelosuppression).   -Aspergill Ag neg from BAL.   -Urine histo/blasto antigens neg, crypto antigen neg, urine legionella neg, and Quantiferon pending.  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 Off sedation and now following commands

## 2018-03-23 NOTE — NURSING
Rounds Report: Attended interdisciplinary rounds this morning with the transplant team including SW, physicians, fellows,  mid-level providers, and transplant coordinators.  Discussed plan of care, pt waking up and following commands. Will go home on dialysis. Discharge at least 2 weeks.    I met with patient's wife. Pt followed commands for me by sticking out tongue, trying to raise left thumb. Pt's sister-in-law was visiting with pt's wife. Mrs Crocker seems to coping better with pt's improvement. She was folding laundered clothes, smiling. Talked about organizing a blood drive at her [employer] in the next few weeks to offset the cost of blood products he will have received.

## 2018-03-23 NOTE — PROGRESS NOTES
UPDATE    SW to pt's room for update. Pt intubated. Pt's wife at bedside and presents as aaox4 with calm affect. Pt's wife reports coping adequately with no needs at this time. Wife reports she is worried that pt did not wake up when sedation was weaned, but she is hopeful he will start to wake up soon. SW provided reflective listening and counseling support. Wife reports family will come this evening and stay with her over night. SW continuing to follow and remains available.

## 2018-03-23 NOTE — SUBJECTIVE & OBJECTIVE
Interval History:   Completed course of abx; remains on posaconazole. ID signed off. No events overnight. Anion gap 23 despite 30 bicarb bath. Family reports patient is more awake today; they are encouraged. Minimal vent requirements. Hourly intake 15cc/hr with tube feeds. Remains on SLED with UF 350cc/hr.     Review of patient's allergies indicates:   Allergen Reactions    No known drug allergies      Current Facility-Administered Medications   Medication Frequency    0.9%  NaCl infusion (CRRT USE ONLY) Continuous    0.9%  NaCl infusion (for blood administration) Q24H PRN    0.9%  NaCl infusion (for blood administration) Q24H PRN    0.9%  NaCl infusion (for blood administration) Q24H PRN    0.9%  NaCl infusion (for blood administration) Q24H PRN    0.9%  NaCl infusion (for blood administration) Q24H PRN    0.9%  NaCl infusion (for blood administration) Q24H PRN    0.9%  NaCl infusion (for blood administration) Q24H PRN    chlorhexidine 0.12 % solution 15 mL BID    dexmedetomidine (PRECEDEX) 400mcg/100mL 0.9% NaCL infusion Continuous    dextrose 50% injection 12.5 g PRN    dextrose 50% injection 25 g PRN    docusate 50 mg/5 mL liquid 100 mg BID    famotidine (PF) injection 20 mg Daily    glucagon (human recombinant) injection 1 mg PRN    glucose chewable tablet 16 g PRN    glucose chewable tablet 24 g PRN    hydrocortisone sodium succinate injection 50 mg Q8H    insulin regular (Humulin R) 100 Units in sodium chloride 0.9% 100 mL infusion Continuous    k phos di & mono-sod phos mono 250 mg tablet 2 tablet Q4H PRN    levothyroxine tablet 125 mcg Before breakfast    metoclopramide HCl injection 5 mg Q6H    polyethylene glycol packet 17 g Daily    posaconazole 200 mg/5ml (40 mg/ml) suspension 200 mg TID WM    sodium chloride 0.9% flush 3 mL Q8H    tacrolimus capsule 1 mg BID       Objective:     Vital Signs (Most Recent):  Temp: 98 °F (36.7 °C) (03/23/18 1200)  Pulse: (!) 117 (03/23/18  "1300)  Resp: (!) 32 (03/23/18 1300)  BP: 135/64 (03/23/18 1032)  SpO2: 100 % (03/23/18 1300)  O2 Device (Oxygen Therapy): ventilator (03/23/18 1114) Vital Signs (24h Range):  Temp:  [97.8 °F (36.6 °C)-99 °F (37.2 °C)] 98 °F (36.7 °C)  Pulse:  [114-125] 117  Resp:  [18-36] 32  SpO2:  [100 %] 100 %  BP: (130-135)/(60-64) 135/64  Arterial Line BP: (115-156)/(51-77) 137/65     Weight: 71.5 kg (157 lb 10.1 oz) (03/22/18 1300)  Body mass index is 24.69 kg/m².  Body surface area is 1.84 meters squared.    I/O last 3 completed shifts:  In: 8260.3 [I.V.:6955.3; Blood:75; NG/GT:1030; IV Piggyback:200]  Out: 20534 [Other:00744]    Physical Exam   Constitutional: He appears well-developed and well-nourished.   HENT:   Head: Normocephalic and atraumatic.   Eyes: Conjunctivae and EOM are normal.   Cardiovascular: Regular rhythm.  Tachycardia present.    Pulmonary/Chest: He has no wheezes. He has no rales.   Abdominal: Soft. He exhibits no distension.   Musculoskeletal: He exhibits edema (1+ edema). He exhibits no deformity.   Neurological: He is alert.   Skin: Skin is warm and dry. He is not diaphoretic.       Significant Labs:  ABGs:   Recent Labs  Lab 03/23/18  0403   PH 7.321*   PCO2 39.6   HCO3 20.4*   POCSATURATED 74*   BE -6     CBC:   Recent Labs  Lab 03/23/18  0759   WBC 8.11   RBC 2.32*   HGB 6.8*   HCT 21.4*   *   MCV 92   MCH 29.3   MCHC 31.8*     CMP:   Recent Labs  Lab 03/23/18  0400   *  222*   CALCIUM 8.3*  8.3*   ALBUMIN 2.8*  2.8*   PROT 6.2     144   K 4.6  4.6   CO2 17*  17*     104   BUN 13  13   CREATININE 1.0  1.0   ALKPHOS 201*   ALT 37   AST 57*   BILITOT 2.3*     All labs within the past 24 hours have been reviewed.     Significant Imaging:  Labs: Reviewed  X-Ray: Reviewed; improved per my read though "moderate edema" reported.   "

## 2018-03-24 PROBLEM — D65 DIC (DISSEMINATED INTRAVASCULAR COAGULATION): Status: ACTIVE | Noted: 2018-01-01

## 2018-03-24 NOTE — SUBJECTIVE & OBJECTIVE
"Interval HPI:   Overnight events:  Remains intubated, failed SBT yesteryday, on CRRT, tolerating TF 20cc/hr and BG at goal on intensive with q2 checks     /64   Pulse (!) 121   Temp 98.2 °F (36.8 °C) (Axillary)   Resp (!) 32   Ht 5' 7" (1.702 m)   Wt 71.5 kg (157 lb 10.1 oz)   SpO2 100%   BMI 24.69 kg/m²     Labs Reviewed and Include      Recent Labs  Lab 03/24/18  0400   *  158*  158*   CALCIUM 8.4*  8.4*  8.4*   ALBUMIN 3.0*  3.0*  3.0*   PROT 6.6     138  138   K 4.5  4.5  4.5   CO2 21*  21*  21*     103  103   BUN 8  8  8   CREATININE 0.7  0.7  0.7   ALKPHOS 206*   ALT 33   AST 43*   BILITOT 2.2*     Lab Results   Component Value Date    WBC 7.46 03/24/2018    HGB 8.1 (L) 03/24/2018    HCT 24.6 (L) 03/24/2018    MCV 89 03/24/2018     (L) 03/24/2018     No results for input(s): TSH, FREET4 in the last 168 hours.  Lab Results   Component Value Date    HGBA1C 6.9 (H) 02/11/2018       Nutritional status:   Body mass index is 24.69 kg/m².  Lab Results   Component Value Date    ALBUMIN 3.0 (L) 03/24/2018    ALBUMIN 3.0 (L) 03/24/2018    ALBUMIN 3.0 (L) 03/24/2018     Lab Results   Component Value Date    PREALBUMIN 5 (L) 03/15/2018    PREALBUMIN 18 (L) 03/24/2015    PREALBUMIN 19 (L) 12/17/2014       Estimated Creatinine Clearance: 125.9 mL/min (based on SCr of 0.7 mg/dL).    Accu-Checks  Recent Labs      03/23/18   1156  03/23/18   1354  03/23/18   1605  03/23/18   1802  03/23/18   2002  03/23/18   2201  03/24/18   0004  03/24/18   0204  03/24/18   0411  03/24/18   0624   POCTGLUCOSE  199*  195*  190*  210*  193*  131*  153*  187*  169*  158*       Current Medications and/or Treatments Impacting Glycemic Control  Immunotherapy:  Immunosuppressants         Stop Route Frequency     tacrolimus capsule 2 mg      -- Oral 2 times daily        Steroids:   Hormones     Start     Stop Route Frequency Ordered    03/23/18 2200  hydrocortisone sodium succinate injection 25 " mg      03/24 2159 IV Every 8 hours 03/23/18 1521        Pressors:    Autonomic Drugs     None        Hyperglycemia/Diabetes Medications: Antihyperglycemics     Start     Stop Route Frequency Ordered    03/14/18 1630  insulin regular (Humulin R) 100 Units in sodium chloride 0.9% 100 mL infusion     Question:  Insulin Rate Adjustment (DO NOT MODIFY ANSWER)  Answer:  [email or web address] [id number]    -- IV Continuous 03/14/18 1530

## 2018-03-24 NOTE — ASSESSMENT & PLAN NOTE
-Off all pressors  -Continue stress dose hydrocortisone 25mg TID.  -Appreciate ID assistance. Only on antifungal

## 2018-03-24 NOTE — PROGRESS NOTES
[facility]  Heart Transplant  Progress Note    Patient Name: Lv Corcker  MRN: [id number]  Admission Date: 3/11/2018  Hospital Length of Stay: 12 days  Attending Physician: Jose A Lloyd MD  Primary Care Provider: Philippe Mohr MD  Principal Problem:Acute respiratory failure with hypoxia    Subjective:     Interval History:     More responsive today but failed to (SBP) extubate this morning. He is off all abx other than antifungal and steroids.  Wife very upset about possibility of tracheostomy being placed in future. Tacro level therapeutic today.     Continuous Infusions:   sodium chloride 0.9% 200 mL/hr at 03/24/18 1400    dexmedetomidine (PRECEDEX) infusion Stopped (03/21/18 0750)    insulin (HUMAN R) infusion (adults) 1.575 Units/hr (03/24/18 1404)     Scheduled Meds:   chlorhexidine  15 mL Mouth/Throat BID    docusate  100 mg Per NG tube BID    famotidine (PF)  20 mg Intravenous Daily    heparin (porcine)  5,000 Units Subcutaneous Q12H    levothyroxine  125 mcg Per NG tube Before breakfast    metoclopramide HCl  5 mg Intravenous Q6H    polyethylene glycol  17 g Per NG tube Daily    posaconazole 200 mg/5ml (40 mg/ml)  200 mg Per NG tube TID WM    sodium chloride 0.9%  3 mL Intravenous Q8H    tacrolimus  2 mg Oral BID     PRN Meds:sodium chloride, sodium chloride, sodium chloride, dextrose 50%, dextrose 50%, glucagon (human recombinant), glucose, glucose, k phos di & mono-sod phos mono    Review of patient's allergies indicates:   Allergen Reactions    No known drug allergies      Objective:     Vital Signs (Most Recent):  Temp: 97.7 °F (36.5 °C) (03/24/18 1100)  Pulse: (!) 115 (03/24/18 1400)  Resp: (!) 31 (03/24/18 1400)  BP: 135/64 (03/23/18 1032)  SpO2: 100 % (03/24/18 1400) Vital Signs (24h Range):  Temp:  [97.1 °F (36.2 °C)-98.9 °F (37.2 °C)] 97.7 °F (36.5 °C)  Pulse:  [113-123] 115  Resp:  [27-38] 31  SpO2:  [98 %-100 %] 100 %  Arterial Line BP:  (105-151)/(48-73) 124/58     Patient Vitals for the past 72 hrs (Last 3 readings):   Weight   03/22/18 1300 71.5 kg (157 lb 10.1 oz)   03/22/18 0526 71.5 kg (157 lb 10.1 oz)     Body mass index is 24.69 kg/m².      Intake/Output Summary (Last 24 hours) at 03/24/18 1450  Last data filed at 03/24/18 1404   Gross per 24 hour   Intake          5032.25 ml   Output             6091 ml   Net         -1058.75 ml       Hemodynamic Parameters:  CVP:  [8 mmHg] 8 mmHg    Telemetry:     Physical Exam   Eyes: Pupils are equal, round, and reactive to light.   Neck: No JVD present.   Cardiovascular: Exam reveals no gallop and no friction rub.    No murmur heard.  tachycardia   Pulmonary/Chest: Effort normal and breath sounds normal.   Abdominal: Soft. Bowel sounds are normal. He exhibits no distension. There is no tenderness.   Musculoskeletal: He exhibits no edema or tenderness.   Neurological:   Mildly responding to command and noxious stimuli   Skin: Skin is warm and dry.   Nursing note and vitals reviewed.         Significant Labs:  CBC:    Recent Labs  Lab 03/23/18  1550 03/24/18  0000 03/24/18  0818   WBC 7.48 7.46 7.88   RBC 2.10* 2.77* 2.83*   HGB 6.1* 8.1* 8.4*   HCT 19.6* 24.6* 25.3*   PLT 92* 102* 118*   MCV 93 89 89   MCH 29.0 29.2 29.7   MCHC 31.1* 32.9 33.2     BNP:  No results for input(s): BNP in the last 168 hours.    Invalid input(s): BNPTRIAGELBLO  CMP:    Recent Labs  Lab 03/22/18  0358  03/23/18  0400  03/23/18  1550 03/23/18  2201 03/24/18  0400   *  158*  < > 222*  222*  < > 203* 162*  162* 158*  158*  158*   CALCIUM 8.1*  8.1*  < > 8.3*  8.3*  < > 8.3* 8.6*  8.6* 8.4*  8.4*  8.4*   ALBUMIN 2.8*  2.8*  < > 2.8*  2.8*  < > 2.8* 2.9*  2.9* 3.0*  3.0*  3.0*   PROT 6.2  --  6.2  --   --   --  6.6     144  < > 144  144  < > 144 143  143 138  138  138   K 3.8  3.8  < > 4.6  4.6  < > 4.3 4.0  4.0 4.5  4.5  4.5   CO2 24  24  < > 17*  17*  < > 18* 23  23 21*  21*  21*   CL  101  101  < > 104  104  < > 105 106  106 103  103  103   BUN 9  9  < > 13  13  < > 15 13  13 8  8  8   CREATININE 0.8  0.8  < > 1.0  1.0  < > 1.0 0.9  0.9 0.7  0.7  0.7   ALKPHOS 182*  --  201*  --   --   --  206*   ALT 43  --  37  --   --   --  33   AST 76*  --  57*  --   --   --  43*   BILITOT 2.4*  --  2.3*  --   --   --  2.2*   < > = values in this interval not displayed.   Coagulation:     Recent Labs  Lab 03/23/18  1550 03/24/18  0000 03/24/18  0818   INR 1.4* 1.2 1.2   APTT 25.5  --   --      LDH:    Recent Labs  Lab 03/23/18  1550   *     Microbiology:  Microbiology Results (last 7 days)     Procedure Component Value Units Date/Time    Blood culture [[id number]] Collected:  03/21/18 0815    Order Status:  Completed Specimen:  Blood from Peripheral, Forearm, Left Updated:  03/24/18 1012     Blood Culture, Routine No Growth to date     Blood Culture, Routine No Growth to date     Blood Culture, Routine No Growth to date     Blood Culture, Routine No Growth to date    Blood culture [[id number]] Collected:  03/18/18 0748    Order Status:  Completed Specimen:  Blood from Peripheral, Hand, Right Updated:  03/23/18 1012     Blood Culture, Routine No growth after 5 days.    Blood culture [[id number]] Collected:  03/18/18 0618    Order Status:  Completed Specimen:  Blood from Peripheral, Hand, Left Updated:  03/21/18 1108     Blood Culture, Routine Gram stain aer bottle: Gram positive cocci in clusters resembling Staph      Blood Culture, Routine Results called to and read back by:Omid Cody RN 03/19/2018  10:55     Blood Culture, Routine --     COAGULASE-NEGATIVE STAPHYLOCOCCUS SPECIES  Organism is a probable contaminant      Blood culture [[id number]] Collected:  03/13/18 1637    Order Status:  Completed Specimen:  Blood from Peripheral, Antecubital, Left Updated:  03/18/18 2012     Blood Culture, Routine No growth after 5 days.    Blood culture [[id number]] Collected:  03/13/18 1701    Order  Status:  Completed Specimen:  Blood from Peripheral, Forearm, Left Updated:  03/18/18 2012     Blood Culture, Routine No growth after 5 days.    Blood culture [[id number]]     Order Status:  Canceled Specimen:  Blood     Blood culture [[id number]]     Order Status:  Canceled Specimen:  Blood           I have reviewed all pertinent labs within the past 24 hours.    Estimated Creatinine Clearance: 125.9 mL/min (based on SCr of 0.7 mg/dL).    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 Most recently has had ongoing issues with his lungs, had gotten 2 thoracenteses, after which he underwent VATS 01/19/18 followed by pleurex catheter placement and effusion drainage 01/11/18, subsequently had it removed 02/07/18 after drainage had decreased despite multiple attempts to drain it. Has been having significant coughing fits that result in him being near-syncopal at times, although difficult to say if he has passed out or not- patient most recently was admitted to the hospital for increased AGUILAR, coughing and pre-syncope 02/11-02/14/18 at [facility].   Patient was started on antibiotics for suspected bacterial infection, with some diarrhea, bronchitis, and possible pneumonia. Ct chest showed some pleural fluid collection and after talking with Dr. James, we arranged for him to receive a diagnostic tap with IR, from which the fluid collection demonstrated no growth or significant findings at all really. Cell counts do not appear to have been sent but will recheck. Patient states since his hospital stay, yesterday had a significant coughing fit again, after which he nearly passed out, is being seen in clinic today for this. Denies any cardiopulmonary complaints, no leg swelling, has some abdominal swelling, which  is moderate for him. Denies significant shortness of breath with mild exertion, had 6MWT yesterday to see if he qualified for home O2, and his O2 sats actually improved after recovery from where he started initially. He and his wife admit he is in bed most days, not very active.     Mr. Crocker presented to the ED 3/11/18 with approximately 3 weeks of worsening diarrhea and 2-3 days of sinus pressure, drainage, and worsened cough.  He notes that he had a coughing fit last night and passed out, coughed throughout most of the night.  This also happened on 2/25/18.  He last saw Dr Ferreira in clinic on 2/20/18 where he was taken off myfortic and switched to cellcept (he hadn't been on cellcept because of leukopenia when they tried post-transplant).  Though his diarrhea had improved after his last discharge, he states it has increased now to 15x/day, clear/yellow/green, no fevers, no exacerbating foods per say.  He was taken back off the cellcept and placed back on myfortic this past week and has not noticed immediate change in diarrhea. He also reports his baseline coughing fits havent improved and are minimally responsive to tessalon pearls.  Came on last night, he lost consciousness during a fit once which is relatively normal for him, and had two more during HPI.  He notes no sick contacts but does state he's had some URI symptoms as above.  His baseline vitals are usually -120 and BP 90s/60s-110s/70s.  He reports a history of intermittent diarrhea - did have Cdiff many years ago but this smells different.  No abdominal pain, no nausea, no vomiting.  No blood in diarrhea.  Appears last c-scope in 2015 with no evidence of infection or inflammatory processes.  He does report IBS which is different that this.       * Acute respiratory failure with hypoxia    -S/P Bronch and intubation 3/14. Cultures ngtd.   -RSV positive. Completed course of Ribavarin/IVIG. Per lung transplant protocol for severe RSV(given  myelosuppression).   -Aspergill Ag neg from BAL.   -Urine histo/blasto antigens neg, crypto antigen neg, urine legionella neg, and Quantiferon pending.  -Continue empiric posaconazole (day 5) for now (transitioned to per tube to avoid accumulation to cyclodextrin carrier) with plan to stop after 10 days of -ve fungal cultures from BAL  -Stopped IV GCV prophylaxis as patient is several years out from transplant without evidence of active CMV disease; will check weekly quants -Await pending tests including: final BAL cultures and Quantiferon   - Off sedation and now following commands. Failed SBP today. Discussed about possible trach with wife today.         Type 1 diabetes mellitus with hyperglycemia    - on insulin gtt        DIC (disseminated intravascular coagulation)    - secondary to sepsis on admission  - DIC labs including FSP, platelets and D-dimer high  -cryoglobulin and FFP being given per hemonc.  - plateles level at 100 today, INR 1.2  - Continue to check fibrinogen, INR and Plts level Q8h            Heart transplanted    - TTE 3/12/18 showed LVEF 50-55% (but no substantial change when compared to previous echo), RV normal and IVC 8  - Had -ve DSE on 11/30/17  - IV Hydrocortisone decreased to 25 mg every 8 hours on 3/22/18   - Tacro resumed at 1 mg bid. Level at 6.2.  Myfortic remains on hold due to renal failure        Acute renal failure superimposed on stage 4 chronic kidney disease    - Appreciate Neph Cx.   - Continue CRRT as tolerated.         Pancytopenia    - Appreciate Hem Cx.   -The patient developed thrombocytopenia 5 days after receiving enoxaparin and has a 4T score of 5 indicating a ~ 14% chance of HIT.  The patients HIT panel was positive with OD of 0.431 indicating a 1-5% chance of having HIT.  Would wait for HILDA before considering anticoagulation with argatroban. HILDA pending.    -The patients thrombocytopenia could be drug induced as well.   -The patient has a low fibrinogen, elevated  INR, shistocytes on peripheral smear and elevated LDH which all point towards DIC. The haptoglobin is elevated which goes against hemolytic anemia,; however, haptoglobin is also an acute phase reactant.  As DIC is the higher on the differential would treat as such by treating underlying cause which is likely sepsis.       -Fibrinogen, INR and plt count every 8 hours.  -Give cryoprecipitate to get fibrinogen over 100.  Would give one unit at a time.  -Give FFP to get the INR below 1.8.  Would give at least two units at a time.  May consider giving the patient Vitamin K given recent use of coumadin.          Ileus    -Had 2 BMs 3/20.  -No residuals now. Will advance tube feeds with goal 50 cc/hr  - having BM per wife        Septic shock    -Off all pressors  -Continue stress dose hydrocortisone 25mg TID.  -Appreciate ID assistance. Only on antifungal          Restrictive cardiomyopathy    -S/P thoracentesis X 2, followed by VATS/pleurex cath placement (January 2018) with removal of pleurex (February 2018) and 3rd thoracentesis 2/14/18 with no significant findings on fluid removal. Moderate right pleural effusion stable by CXR 3/11/18 and chest CT 3/13/18  -Last paracentesis was in January. Abd US 3/12/18 confirmed ongoing ascites (not tense at all). Will consider getting paracentesis this admit          Type 1 diabetes mellitus with stage 3 chronic kidney disease    - Appreciate Endocrine's recs        Hypothyroidism due to Hashimoto's thyroiditis    - TSH low at 0.101 with normal FT4. Appreciate Endocrine's recs        Anemia    - transfused one PRB yesterday.  -hb today us 8.1          Discussed plan with Dr. ranjit Walsh MD  Heart Transplant  [facility]

## 2018-03-24 NOTE — ASSESSMENT & PLAN NOTE
-Had 2 BMs 3/20.  -No residuals now. Will advance tube feeds with goal 50 cc/hr  - having BM per wife

## 2018-03-24 NOTE — SUBJECTIVE & OBJECTIVE
Interval History:     More responsive today but failed to (SBP) extubate this morning. He is off all abx other than antiviral and steroids.  Wife very upset about possibility of tracheostomy being placed in future. Tacro level therapeutic today.     Continuous Infusions:   sodium chloride 0.9% 200 mL/hr at 03/24/18 1400    dexmedetomidine (PRECEDEX) infusion Stopped (03/21/18 0750)    insulin (HUMAN R) infusion (adults) 1.575 Units/hr (03/24/18 1404)     Scheduled Meds:   chlorhexidine  15 mL Mouth/Throat BID    docusate  100 mg Per NG tube BID    famotidine (PF)  20 mg Intravenous Daily    heparin (porcine)  5,000 Units Subcutaneous Q12H    levothyroxine  125 mcg Per NG tube Before breakfast    metoclopramide HCl  5 mg Intravenous Q6H    polyethylene glycol  17 g Per NG tube Daily    posaconazole 200 mg/5ml (40 mg/ml)  200 mg Per NG tube TID WM    sodium chloride 0.9%  3 mL Intravenous Q8H    tacrolimus  2 mg Oral BID     PRN Meds:sodium chloride, sodium chloride, sodium chloride, dextrose 50%, dextrose 50%, glucagon (human recombinant), glucose, glucose, k phos di & mono-sod phos mono    Review of patient's allergies indicates:   Allergen Reactions    No known drug allergies      Objective:     Vital Signs (Most Recent):  Temp: 97.7 °F (36.5 °C) (03/24/18 1100)  Pulse: (!) 115 (03/24/18 1400)  Resp: (!) 31 (03/24/18 1400)  BP: 135/64 (03/23/18 1032)  SpO2: 100 % (03/24/18 1400) Vital Signs (24h Range):  Temp:  [97.1 °F (36.2 °C)-98.9 °F (37.2 °C)] 97.7 °F (36.5 °C)  Pulse:  [113-123] 115  Resp:  [27-38] 31  SpO2:  [98 %-100 %] 100 %  Arterial Line BP: (105-151)/(48-73) 124/58     Patient Vitals for the past 72 hrs (Last 3 readings):   Weight   03/22/18 1300 71.5 kg (157 lb 10.1 oz)   03/22/18 0526 71.5 kg (157 lb 10.1 oz)     Body mass index is 24.69 kg/m².      Intake/Output Summary (Last 24 hours) at 03/24/18 1450  Last data filed at 03/24/18 1404   Gross per 24 hour   Intake          5032.25 ml    Output             6091 ml   Net         -1058.75 ml       Hemodynamic Parameters:  CVP:  [8 mmHg] 8 mmHg    Telemetry:     Physical Exam   Eyes: Pupils are equal, round, and reactive to light.   Neck: No JVD present.   Cardiovascular: Exam reveals no gallop and no friction rub.    No murmur heard.  tachycardia   Pulmonary/Chest: Effort normal and breath sounds normal.   Abdominal: Soft. Bowel sounds are normal. He exhibits no distension. There is no tenderness.   Musculoskeletal: He exhibits no edema or tenderness.   Neurological:   Mildly responding to command and noxious stimuli   Skin: Skin is warm and dry.   Nursing note and vitals reviewed.         Significant Labs:  CBC:    Recent Labs  Lab 03/23/18  1550 03/24/18  0000 03/24/18  0818   WBC 7.48 7.46 7.88   RBC 2.10* 2.77* 2.83*   HGB 6.1* 8.1* 8.4*   HCT 19.6* 24.6* 25.3*   PLT 92* 102* 118*   MCV 93 89 89   MCH 29.0 29.2 29.7   MCHC 31.1* 32.9 33.2     BNP:  No results for input(s): BNP in the last 168 hours.    Invalid input(s): BNPTRIAGELBLO  CMP:    Recent Labs  Lab 03/22/18  0358  03/23/18  0400  03/23/18  1550 03/23/18  2201 03/24/18  0400   *  158*  < > 222*  222*  < > 203* 162*  162* 158*  158*  158*   CALCIUM 8.1*  8.1*  < > 8.3*  8.3*  < > 8.3* 8.6*  8.6* 8.4*  8.4*  8.4*   ALBUMIN 2.8*  2.8*  < > 2.8*  2.8*  < > 2.8* 2.9*  2.9* 3.0*  3.0*  3.0*   PROT 6.2  --  6.2  --   --   --  6.6     144  < > 144  144  < > 144 143  143 138  138  138   K 3.8  3.8  < > 4.6  4.6  < > 4.3 4.0  4.0 4.5  4.5  4.5   CO2 24  24  < > 17*  17*  < > 18* 23  23 21*  21*  21*     101  < > 104  104  < > 105 106  106 103  103  103   BUN 9  9  < > 13  13  < > 15 13  13 8  8  8   CREATININE 0.8  0.8  < > 1.0  1.0  < > 1.0 0.9  0.9 0.7  0.7  0.7   ALKPHOS 182*  --  201*  --   --   --  206*   ALT 43  --  37  --   --   --  33   AST 76*  --  57*  --   --   --  43*   BILITOT 2.4*  --  2.3*  --   --   --  2.2*   < > =  values in this interval not displayed.   Coagulation:     Recent Labs  Lab 03/23/18  1550 03/24/18  0000 03/24/18  0818   INR 1.4* 1.2 1.2   APTT 25.5  --   --      LDH:    Recent Labs  Lab 03/23/18  1550   *     Microbiology:  Microbiology Results (last 7 days)     Procedure Component Value Units Date/Time    Blood culture [[id number]] Collected:  03/21/18 0815    Order Status:  Completed Specimen:  Blood from Peripheral, Forearm, Left Updated:  03/24/18 1012     Blood Culture, Routine No Growth to date     Blood Culture, Routine No Growth to date     Blood Culture, Routine No Growth to date     Blood Culture, Routine No Growth to date    Blood culture [[id number]] Collected:  03/18/18 0748    Order Status:  Completed Specimen:  Blood from Peripheral, Hand, Right Updated:  03/23/18 1012     Blood Culture, Routine No growth after 5 days.    Blood culture [[id number]] Collected:  03/18/18 0618    Order Status:  Completed Specimen:  Blood from Peripheral, Hand, Left Updated:  03/21/18 1108     Blood Culture, Routine Gram stain aer bottle: Gram positive cocci in clusters resembling Staph      Blood Culture, Routine Results called to and read back by:Omid Cody RN 03/19/2018  10:55     Blood Culture, Routine --     COAGULASE-NEGATIVE STAPHYLOCOCCUS SPECIES  Organism is a probable contaminant      Blood culture [[id number]] Collected:  03/13/18 1637    Order Status:  Completed Specimen:  Blood from Peripheral, Antecubital, Left Updated:  03/18/18 2012     Blood Culture, Routine No growth after 5 days.    Blood culture [[id number]] Collected:  03/13/18 1701    Order Status:  Completed Specimen:  Blood from Peripheral, Forearm, Left Updated:  03/18/18 2012     Blood Culture, Routine No growth after 5 days.    Blood culture [[id number]]     Order Status:  Canceled Specimen:  Blood     Blood culture [[id number]]     Order Status:  Canceled Specimen:  Blood           I have reviewed all pertinent labs within the past 24  hours.    Estimated Creatinine Clearance: 125.9 mL/min (based on SCr of 0.7 mg/dL).    Diagnostic Results:

## 2018-03-24 NOTE — PT/OT/SLP EVAL
"Occupational Therapy   Co-Evaluation    Name: Lv Crocker  MRN: [id number]  Admitting Diagnosis:  Acute respiratory failure with hypoxia 11 Days Post-Op     Recommendations:     Discharge Recommendations:  (TBD Pending further mobility)  Discharge Equipment Recommendations:   (TBD)  Barriers to discharge:  Inaccessible home environment, Decreased caregiver support (at current functional level)    History:     Occupational Profile:  Living Environment: Pt lives with spouse and 15 y.o son in a H with 7STE and b/l wide handrails. Bathroom has a tub/shower combo.   Previous level of function: PTA, pt was independent with functional mobility within the house and ADLs, however wife reports pt was fatigued daily and would sleep a majority of the day. Pt rarely leaves the home and when he does, he utilizes the rollator for functional mobility.   Roles and Routines: Pt is a father and   Equipment Owned:   (rollator with community mobility)  Assistance upon Discharge: Pt's spouse is a full time nurse, however pt has good family support and can assist as needed.     Past Medical History:   Diagnosis Date    Arthritis     Ascites     Thought to be 2/2 heart tpx; liver bx 3/31/17 w/o significant fibrosis    Cardiomyopathy     Depression     Controlled "for the most part" on Lexipro    Encounter for blood transfusion     during transplant    GERD (gastroesophageal reflux disease)     Hashimoto's disease     History of CHF (congestive heart failure)     Previously EF 20% (prior to heart transplant); last EF 60%, PAP 41 as of 12/12/17; throught to be 2/2 genetics & DM1    History of coronary artery disease     H/o Coronary artery disease; resolved since heart transplant 2014    History of myocardial infarction     h/o MI x3 prior to heart transplant    HLD (hyperlipidemia) 6/11/2015    Hypoglycemia unawareness in type 1 diabetes mellitus     Hypothyroid     Initially hyperthyroid 2/2 Jenniferhimoto's " "thyroiditis; now on levothyroxine 150 mcg qd    Pleural effusion due to another disorder     Thought to be 2/2 heart tpx; s/p PleuRx catheter placement and removal after 1-2 months    Pulmonary hypertension assoc with unclear multi-factorial mechanisms 12/12/2017    PAP 41 (EF 60%) on ECHO 12/12/17    Syncope 6/30/2015    Type I diabetes mellitus, well controlled     Well controlled; Dx'd @5y/o; on N insulin 20U BID & Humalog 10U w/meals +SSI; Glu 89 11/9/17; A1c 7.2% 4/5/17    Unspecified essential hypertension 05/29/2014    Well controlled; Last /57 on 11/9/17       Past Surgical History:   Procedure Laterality Date    CARDIAC CATHETERIZATION      CARDIAC SURGERY      CHOLECYSTECTOMY      COLONOSCOPY N/A 3/13/2018    Procedure: COLONOSCOPY;  Surgeon: Tim Spencer MD;  Location: [facility] ([address] [place] [address]);  Service: Endoscopy;  Laterality: N/A;    CORONARY ANGIOPLASTY      FOOT SPLIT TRANSFER OF THE POSTERIOR TIBIALIS TENDON PROCEDURE      HEART TRANSPLANT  2014    KNEE SURGERY      PleuRx catheter placement  01/11/2018    Plan for removal after 1-2 months by Dr. James in cardiothoracic surgery    s/p oht  November 2014    stent placemnet         Subjective     Chief Complaint: pain (noted via grimacing on face)  Patient/Family stated goals: return to PLOF (as per wife)  Communicated with: RN (julio cesar) prior to session. Pt agreeable to therapy evaluation as nodded head "yes" in agreement   Pain/Comfort:  · Pain Rating 1:  (pt nodding "no" when asked about pain at start of session)  · Pain Rating Post-Intervention 1:  (pt noted with facial grimacing during AAROM/PROM in LLE and RLE)    Patients cultural, spiritual, Caodaism conflicts given the current situation: none reported     Objective:     Patient found with: arterial line, blood pressure cuff, central line, PEG Tube, pressure relief boots, peripheral IV, pulse ox (continuous), telemetry, ventilator, SCD (CRRT running in RIJ; OG " tube)    General Precautions: Standard, fall, respiratory, NPO   Orthopedic Precautions:N/A   Braces: N/A     Occupational Performance:    Functional Mobility & Activities of Daily Living:    · Unable to assess at this time 2/2 pt intubated at this time     Cognitive/Visual Perceptual:  · Pt alert with eyes open 80% of the evaluation and ability to follow simple one step commands approx 50% of the time. Pt unable to communicate verbally as intubated at this time, however pt able to respond appropriately with non-verbal communication (facial grimacing, head shaking) primarily in response to pain upon AAROM and PROM of BUE's and BLE's.     Physical Exam:  Skin integrity: Visible skin intact  Edema:  None noted  Upper Extremity Range of Motion:     -       Right Upper Extremity: WFL PROM in all joints except shoulder; PROM to approx 85 degrees prior to facial grimacing   -       Left Upper Extremity: WFL PROM to all joints   Upper Extremity Strength:    -       Right Upper Extremity: Deficits: 1/5 in BUEs as muscle mary, however pt unable to lift in gravity elimiated positioning   -       Left Upper Extremity: Deficits: 1/5 in BUEs as muscle mary, however pt unable to lift in gravity elimiated positioning   Strength:    -       Right Upper Extremity: Deficits: 3/5  -       Left Upper Extremity: Deficits: 3/5  Fine Motor Coordination:    -       Impaired  Left hand, finger to nose  . and Right hand, finger to nose      Patient left HOB elevated with all lines intact, call button in reach and RN notified    AMPA 6 Click:  AMPA Total Score: 6    Treatment & Education:  -RIJ CRRT running throughout session and R femoral A-line in place at end of session   -Pt following approx 50% of motor commands in session with increased arousal and engagement towards end of session and shaking head yes/no in response to questions   -Pt with limited ROM at baseline d/t R rotator cuff and brachial plexus injury & R  "ankle fusion  -History obtain from spouse as pt intubated at evaluation   -BUE AAROM and PROM provided by OT and BLE AAROM and PROM provided by PT with RN educated on and instructed to complete to maximize strength and ROM for use in functional daily tasks    Additional:  Communicated role of OT/POC  Updated communication board  RN notified post-OT session  Education:    Assessment:     Lv Crocker is a 42 y.o. male with a medical diagnosis of Acute respiratory failure with hypoxia. Pt is currently intubate with possible plan for extubation 3/25/2018. Pt with prior R ankle fusion, R rotator cuff injury, and R brachial plexus injury limiting motion in R ankle and R shoulder. Performance deficits affecting function are weakness, impaired endurance, impaired self care skills, impaired functional mobilty, impaired balance, decreased upper extremity function, decreased lower extremity function, decreased safety awareness, pain, decreased ROM, impaired cardiopulmonary response to activity.  Pt with good motivation to engage in therapy session at this time. Pt with generalized weakness, impaired functional activity tolerance with inability to lift BUE's and BLE's against gravity at this time. Discharge and DME pending further mobility and medical stability.     Rehab Prognosis:  fair; patient would benefit from acute skilled OT services to address these deficits and reach maximum level of function.         Clinical Decision Makin.  OT Mod:  "Pt evaluation falls under moderate complexity for evaluation coding due to identification of 3-5 performance deficits noted as stated above. Eval required Min/Mod assistance to complete on this date and detailed assessment(s) were utilized. Moreover, an expanded review of history and occupational profile obtained with additional review of cognitive, physical and psychosocial hx."     Plan:     Patient to be seen 5 x/week to address the above listed problems via " self-care/home management, therapeutic activities, therapeutic exercises, neuromuscular re-education  · Plan of Care Expires: 04/23/18  · Plan of Care Reviewed with: patient    This Plan of care has been discussed with the patient who was involved in its development and understands and is in agreement with the identified goals and treatment plan    GOALS:    Occupational Therapy Goals        Problem: Occupational Therapy Goal    Goal Priority Disciplines Outcome Interventions   Occupational Therapy Goal     OT, PT/OT Ongoing (interventions implemented as appropriate)    Description:  Goals to be met by: 4/7/2018    Pt will follow 75% of motor commands with <2 verbal cues for repetition of task to maximize engagement in grooming task.  Pt will complete feeding with mod A.   Pt will complete supine with HOB slightly elevated to seated at EOB with mod A in prep for seated grooming task.  Pt will engage in BUE exercises in orders to increase strength to 3+/5 in BUE's to increase engagement in seated grooming task  Pt will sit at EOB for approx 10 minutes with mod A and unilateral UE support to complete grooming task  Pt will complete sit>stand from EOB with bed in lowest position with mod A in prep for transfer to [facility]                    Time Tracking:     OT Date of Treatment: 03/24/18  OT Start Time: 1024  OT Stop Time: 1044  OT Total Time (min): 20 min    Billable Minutes:Evaluation 20 minutes    Moriah Villar OT  3/24/2018

## 2018-03-24 NOTE — ASSESSMENT & PLAN NOTE
-S/P Bronch and intubation 3/14. Cultures ngtd.   -RSV positive. Completed course of Ribavarin/IVIG. Per lung transplant protocol for severe RSV(given myelosuppression).   -Aspergill Ag neg from BAL.   -Urine histo/blasto antigens neg, crypto antigen neg, urine legionella neg, and Quantiferon pending.  -Continue empiric posaconazole for now (transitioned to per tube to avoid accumulation to cyclodextrin carrier) with plan to stop after 10 days of -ve fungal cultures from BAL  -Stopped IV GCV prophylaxis as patient is several years out from transplant without evidence of active CMV disease; will check weekly quants -Await pending tests including: final BAL cultures and Quantiferon   - Off sedation and now following commands. Failed SBP today. Discussed about possible trach with wife today.

## 2018-03-24 NOTE — PROGRESS NOTES
"[facility]  Endocrinology  Progress Note    Admit Date: 3/11/2018     Reason for Consult: abnormal TFTs    Surgical Procedure and Date: s/p heart transplant 2014     Diabetes diagnosis year: 7yo (T1DM)     Home Diabetes Medications:    Levemir 15u qHS  Novolog 4u AC     How often checking glucose at home? 4 times daily   BG readings on regimen: 150-200s  Hypoglycemia on the regimen?  Yes, rarely - treats appropriately (light snack, glucose tab)  Missed doses on regimen?  No        Diabetes Complications include:     Hyperglycemia, Hypoglycemia  and Diabetic chronic kidney disease          Complicating diabetes co morbidities:   N/A     HPI:   Patient is a 42 y.o. male with a diagnosis of T1DM, HTN, hypothyroidism, s/p heart transplant.  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 He required serial paracentesis and thoracentesis until he underwent a VATS with pleurX catheter placement on 1/11/2018 and removed 2/7/18.       Presented to hospital secondary to diarrhea and cough.  Upon initial labs, TSH was decreased (0.101) and free T4 1.33.  Endocrinology consulted to assist in management of these labs.  He was last seen in clinic by Kamala Vázquez NP 11/2017.   Previous hospitalization, endocrine consulted for same problem.  During that visit, recommended decreasing LT4 to 125mcg daily. Unfortunately, patient was discharged on previous dose of 150mcg daily.     Interval HPI:   Overnight events:  Remains intubated, failed SBT yesteryday, on CRRT, tolerating TF 20cc/hr and BG at goal on intensive with q2 checks     /64   Pulse (!) 121   Temp 98.2 °F (36.8 °C) (Axillary)   Resp (!) 32   Ht 5' 7" (1.702 m)   Wt 71.5 kg (157 lb 10.1 oz)   SpO2 100%   BMI 24.69 kg/m²       Labs Reviewed and Include      Recent Labs  Lab 03/24/18  0400   *  158*  158*   CALCIUM 8.4*  8.4*  " 8.4*   ALBUMIN 3.0*  3.0*  3.0*   PROT 6.6     138  138   K 4.5  4.5  4.5   CO2 21*  21*  21*     103  103   BUN 8  8  8   CREATININE 0.7  0.7  0.7   ALKPHOS 206*   ALT 33   AST 43*   BILITOT 2.2*     Lab Results   Component Value Date    WBC 7.46 03/24/2018    HGB 8.1 (L) 03/24/2018    HCT 24.6 (L) 03/24/2018    MCV 89 03/24/2018     (L) 03/24/2018     No results for input(s): TSH, FREET4 in the last 168 hours.  Lab Results   Component Value Date    HGBA1C 6.9 (H) 02/11/2018       Nutritional status:   Body mass index is 24.69 kg/m².  Lab Results   Component Value Date    ALBUMIN 3.0 (L) 03/24/2018    ALBUMIN 3.0 (L) 03/24/2018    ALBUMIN 3.0 (L) 03/24/2018     Lab Results   Component Value Date    PREALBUMIN 5 (L) 03/15/2018    PREALBUMIN 18 (L) 03/24/2015    PREALBUMIN 19 (L) 12/17/2014       Estimated Creatinine Clearance: 125.9 mL/min (based on SCr of 0.7 mg/dL).    Accu-Checks  Recent Labs      03/23/18   1156  03/23/18   1354  03/23/18   1605  03/23/18   1802  03/23/18   2002  03/23/18   2201  03/24/18   0004  03/24/18   0204  03/24/18   0411  03/24/18   0624   POCTGLUCOSE  199*  195*  190*  210*  193*  131*  153*  187*  169*  158*       Current Medications and/or Treatments Impacting Glycemic Control  Immunotherapy:  Immunosuppressants         Stop Route Frequency     tacrolimus capsule 2 mg      -- Oral 2 times daily        Steroids:   Hormones     Start     Stop Route Frequency Ordered    03/23/18 2200  hydrocortisone sodium succinate injection 25 mg      03/24 2159 IV Every 8 hours 03/23/18 1521        Pressors:    Autonomic Drugs     None        Hyperglycemia/Diabetes Medications: Antihyperglycemics     Start     Stop Route Frequency Ordered    03/14/18 1630  insulin regular (Humulin R) 100 Units in sodium chloride 0.9% 100 mL infusion     Question:  Insulin Rate Adjustment (DO NOT MODIFY ANSWER)  Answer:  [email or web address]    -- IV Continuous 03/14/18 1530          ASSESSMENT and PLAN    Type 1 diabetes mellitus with hyperglycemia    BG goal 140-180  Continue intensive insulin gtt q2hr checks while remains intubated.     If BG continues to drop while on insulin rate 0.1u/hr, will need to add D5 IVF to be able to continue continuous insulin gtt in pt with T1DM. Do not suspend gtt >1 hr, pt is T1DM.    Low c peptide with glc 164. Treat as type 1.   Neg MODE 2013, neg islet cell ab on this admission.   Cannot order insulin ab while on insulin, and ZnT8 unavailable in labs.            On enteral nutrition     May adversely impact bg                      Hypothyroidism due to Hashimoto's thyroiditis     Abnormal TFTs upon admit.  Unclear if secondary to illness, but with evidence of iatrogenic hyperthyroidism in past while on above weight based dose.      Continue LT4 ng 125mcg daily (decreased from 150mcg)  Repeat TFTs in 4 weeks (due ~4/15)             Acute renal failure superimposed on stage 4 chronic kidney disease     Avoid insulin stacking             Heart transplanted     Per transplant  Avoid hypoglycemia             Current use of steroid medication     Chronic steroids PDN 2.5mg o/p.   Agree with stress dose steroids during critical illness           Palmira Dorsey MD  Endocrinology  [facility]

## 2018-03-24 NOTE — ASSESSMENT & PLAN NOTE
- secondary to sepsis on admission  - DIC labs including FSP, platelets and D-dimer high  -cryoglobulin and FFP being given per hemonc.  - plateles level at 100 today, INR 1.2  - Continue to check fibrinogen, INR and Plts level Q8h

## 2018-03-24 NOTE — PT/OT/SLP EVAL
"Physical Therapy Evaluation    Patient Name:  Lv Crocker   MRN:  [id number]    Recommendations:     Discharge Recommendations:   (TBD pending progress)   Discharge Equipment Recommendations:  (TBD)   Barriers to discharge: Inaccessible home and Decreased caregiver support (7 KIARRA and low functional level)    Assessment:     Lv Crocker is a 42 y.o. male admitted with a medical diagnosis of Acute respiratory failure with hypoxia.  He presents with the following impairments/functional limitations:  weakness, impaired endurance, impaired self care skills, impaired functional mobilty, gait instability, impaired balance, impaired cognition, decreased coordination, decreased upper extremity function, decreased lower extremity function, decreased ROM, impaired coordination, impaired fine motor, impaired cardiopulmonary response to activity. Session performed at bed level due to CRRT and ventilator. Pt is appropriate for acute PT and will begin PT POC.    Rehab Prognosis:  Good; patient would benefit from acute skilled PT services to address these deficits and reach maximum level of function.      Recent Surgery: Procedure(s) (LRB):  ESOPHAGOGASTRODUODENOSCOPY (EGD) (N/A)  COLONOSCOPY (N/A) 11 Days Post-Op    Plan:     During this hospitalization, patient to be seen 5 x/week to address the above listed problems via gait training, therapeutic activities, therapeutic exercises, neuromuscular re-education  · Plan of Care Expires:  04/21/18   Plan of Care Reviewed with: patient    Subjective     Communicated with nursing prior to session.  Patient found supine upon PT entry to room, agreeable to evaluation.      Patient comments/goals: nonverbal due to vent  Pain/Comfort:  · Pain Rating 1:  (pt nodding "no" when asked about pain)    Patients cultural, spiritual, Jehovah's witness conflicts given the current situation: none reported    Living Environment:  Per wife- Pt lives w/ wife and 15 y/o son in a 1SH w/ 7 KIARRA " and (B) rails. They have a tub/shower combo.  Prior to admission, patients level of function was limited. He ambulated short household distances w/o an AD. In the past few months pt has only left the house once and use a rollator for mobility.  Patient has the following equipment: rollator (for community ambulation which has been rare).  DME owned (not currently used): none.  Upon discharge, patient will have assistance from his wife and other family members as needed.    Objective:     Patient found with: arterial line, blood pressure cuff, central line, peripheral IV, PRAFO, pulse ox (continuous), SCD, telemetry, ventilator (CRRT running in RIJ, OG tube)     General Precautions: Standard, fall, respiratory, NPO   Orthopedic Precautions:N/A   Braces: N/A     Exams:  · Cognitive Exam:  Pt able to follow simple commands 50% of the time (squeezing hands, moving toes)  · RLE ROM: WFL except ankle limited due to fusion  · RLE Strength: grossly 1/5, assessed in supine  · LLE ROM: WFL except ankle   · LLE Strength: grossly 1/5, assessed in supine    Functional Mobility:  · Bed Mobility and transfers deferred to next session due to vent and CRRT     AM-PAC 6 CLICK MOBILITY  Total Score:8       Therapeutic Activities and Exercises:   Supine PROM/AAROM hips, knees, ankle in all planes of motion. Cueing and encouragement for active assistance.    Patient left supine with all lines intact, call button in reach and nurse notified.    GOALS:    Physical Therapy Goals        Problem: Physical Therapy Goal    Goal Priority Disciplines Outcome Goal Variances Interventions   Physical Therapy Goal     PT/OT, PT Ongoing (interventions implemented as appropriate)     Description:  Goals to be met by: 18     Patient will increase functional independence with mobility by performin. Supine to sit with Moderate Assistance  2. Sit to supine with Moderate Assistance  3. Rolling to Left and Right with Minimal Assistance.  4. Sit  "to stand transfer with Moderate Assistance  5. Bed to chair transfer with Maximum Assistance   6. Gait  x 20 feet with Minimal Assistance.                       History:     Past Medical History:   Diagnosis Date    Arthritis     Ascites     Thought to be 2/2 heart tpx; liver bx 3/31/17 w/o significant fibrosis    Cardiomyopathy     Depression     Controlled "for the most part" on Lexipro    Encounter for blood transfusion     during transplant    GERD (gastroesophageal reflux disease)     Hashimoto's disease     History of CHF (congestive heart failure)     Previously EF 20% (prior to heart transplant); last EF 60%, PAP 41 as of 12/12/17; throught to be 2/2 genetics & DM1    History of coronary artery disease     H/o Coronary artery disease; resolved since heart transplant 2014    History of myocardial infarction     h/o MI x3 prior to heart transplant    HLD (hyperlipidemia) 6/11/2015    Hypoglycemia unawareness in type 1 diabetes mellitus     Hypothyroid     Initially hyperthyroid 2/2 Hoshimoto's thyroiditis; now on levothyroxine 150 mcg qd    Pleural effusion due to another disorder     Thought to be 2/2 heart tpx; s/p PleuRx catheter placement and removal after 1-2 months    Pulmonary hypertension assoc with unclear multi-factorial mechanisms 12/12/2017    PAP 41 (EF 60%) on ECHO 12/12/17    Syncope 6/30/2015    Type I diabetes mellitus, well controlled     Well controlled; Dx'd @7y/o; on N insulin 20U BID & Humalog 10U w/meals +SSI; Glu 89 11/9/17; A1c 7.2% 4/5/17    Unspecified essential hypertension 05/29/2014    Well controlled; Last /57 on 11/9/17       Past Surgical History:   Procedure Laterality Date    CARDIAC CATHETERIZATION      CARDIAC SURGERY      CHOLECYSTECTOMY      COLONOSCOPY N/A 3/13/2018    Procedure: COLONOSCOPY;  Surgeon: Tim Spencer MD;  Location: [facility] ([address] [place] [address]);  Service: Endoscopy;  Laterality: N/A;    CORONARY ANGIOPLASTY      FOOT SPLIT TRANSFER " OF THE POSTERIOR TIBIALIS TENDON PROCEDURE      HEART TRANSPLANT  2014    KNEE SURGERY      PleuRx catheter placement  01/11/2018    Plan for removal after 1-2 months by Dr. James in cardiothoracic surgery    s/p oht  November 2014    stent placemnet         Clinical Decision Making:     History  Co-morbidities and personal factors that may impact the plan of care Examination  Body Structures and Functions, activity limitations and participation restrictions that may impact the plan of care Clinical Presentation   Decision Making/ Complexity Score   Co-morbidities:   [] Time since onset of injury / illness / exacerbation  [x] Status of current condition  [x]Patient's cognitive status and safety concerns    [] Multiple Medical Problems (see med hx)  Personal Factors:   [] Patient's age  [x] Prior Level of function   [] Patient's home situation (environment and family support)  [] Patient's level of motivation  [] Expected progression of patient      HISTORY:(criteria)    [] 82071 - no personal factors/history    [] 78002 - has 1-2 personal factor/comorbidity     [x] 20460 - has >3 personal factor/comorbidity     Body Regions:  [] Objective examination findings  [] Head     []  Neck  [] Trunk   [] Upper Extremity  [] Lower Extremity    Body Systems:  [x] For communication ability, affect, cognition, language, and learning style: the assessment of the ability to make needs known, consciousness, orientation (person, place, and time), expected emotional /behavioral responses, and learning preferences (eg, learning barriers, education  needs)  [x] For the neuromuscular system: a general assessment of gross coordinated movement (eg, balance, gait, locomotion, transfers, and transitions) and motor function  (motor control and motor learning)  [x] For the musculoskeletal system: the assessment of gross symmetry, gross range of motion, gross strength, height, and weight  [] For the integumentary system: the  assessment of pliability(texture), presence of scar formation, skin color, and skin integrity  [x] For cardiovascular/pulmonary system: the assessment of heart rate, respiratory rate, blood pressure, and edema     Activity limitations:    [] Patient's cognitive status and saf ety concerns          [x] Status of current condition      [] Weight bearing restriction  [] Cardiopulmunary Restriction    Participation Restrictions:   [] Goals and goal agreement with the patient     [] Rehab potential (prognosis) and probable outcome      Examination of Body System: (criteria)    [] 63904 - addressing 1-2 elements    [] 80469 - addressing a total of 3 or more elements     [x] 29873 -  Addressing a total of 4 or more elements         Clinical Presentation: (criteria)  Unstable - 86578     On examination of body system using standardized tests and measures patient presents with 4 or more elements from any of the following: body structures and functions, activity limitations, and/or participation restrictions.  Leading to a clinical presentation that is considered unstable with unpredictable characteristics                              Clinical Decision Making  (Eval Complexity):  High - 58084     Time Tracking:     PT Received On: 03/24/18  PT Start Time: 1024     PT Stop Time: 1044  PT Total Time (min): 20 min     Billable Minutes: Evaluation 10, Therapeutic Exercise 10 and Total Time 20      Marli Sepulveda, PT  03/24/2018

## 2018-03-24 NOTE — ASSESSMENT & PLAN NOTE
- TTE 3/12/18 showed LVEF 50-55% (but no substantial change when compared to previous echo), RV normal and IVC 8  - Had -ve DSE on 11/30/17  - IV Hydrocortisone decreased to 25 mg every 8 hours on 3/22/18   - Tacro resumed at 1 mg bid. Level at 6.2.  Myfortic remains on hold due to renal failure

## 2018-03-24 NOTE — PLAN OF CARE
Problem: Patient Care Overview  Goal: Plan of Care Review  Outcome: Ongoing (interventions implemented as appropriate)  No acute events throughout day. See vital signs and assessments in flowsheets. See below for updates on today's progress.     Pulmonary: Patient failed SBT this am r/t increased WOB and using abdominal muscles. Remains vented with FiO2 of 40% and peep of 5. Will try SBT tomorrow am.     Cardiovascular: HR is ST at 119. Blood pressure on steven running 110-120/50-60. Pulses are 1+ upper/lower.    Neurological: Able to open eyes to voice and to wiggle fingers/toes on command.     Gastrointestinal: Three BMs this shift.    Genitourinary: Anuric. On CRRT.    Endocrine: Hyperglycemic.    Integumentary/Other: Wound care consult placed for necrotic tissue on end of fingers/toes.    Infusions: Insulin drip infusing.    Patient progressing towards goals as tolerated, plan of care communicated and reviewed with Lv Crocker and family. All concerns addressed. Will continue to monitor.

## 2018-03-24 NOTE — PLAN OF CARE
Problem: Occupational Therapy Goal  Goal: Occupational Therapy Goal  Goals to be met by: 4/7/2018    Pt will follow 75% of motor commands with <2 verbal cues for repetition of task to maximize engagement in grooming task.  Pt will complete feeding with mod A.   Pt will complete supine with HOB slightly elevated to seated at EOB with mod A in prep for seated grooming task.  Pt will engage in BUE exercises in orders to increase strength to 3+/5 in BUE's to increase engagement in seated grooming task  Pt will sit at EOB for approx 10 minutes with mod A and unilateral UE support to complete grooming task  Pt will complete sit>stand from EOB with bed in lowest position with mod A in prep for transfer to [facility]  Outcome: Ongoing (interventions implemented as appropriate)  OT evaluation completed and POC initiated 3/24/2018. D/C plan TBD pending further engagement in therapy and medical stability.

## 2018-03-24 NOTE — PLAN OF CARE
Problem: Physical Therapy Goal  Goal: Physical Therapy Goal  Goals to be met by: 18     Patient will increase functional independence with mobility by performin. Supine to sit with Moderate Assistance  2. Sit to supine with Moderate Assistance  3. Rolling to Left and Right with Minimal Assistance.  4. Sit to stand transfer with Moderate Assistance  5. Bed to chair transfer with Maximum Assistance   6. Gait  x 20 feet with Minimal Assistance.     Outcome: Ongoing (interventions implemented as appropriate)  PT eval completed. Pt will begin PT POC.    Marli Sepulveda, CARRILLO  3/24/2018

## 2018-03-24 NOTE — PROGRESS NOTES
[facility]  Pulm/Critical Care - Medicine  Progress Note    Patient Name: Lv Crocker  MRN: [id number]  Admission Date: 3/11/2018  Hospital Length of Stay: 12 days  Code Status: Full Code  Attending Provider: Jose A Lloyd MD  Primary Care Provider: Philippe Mohr MD   Principal Problem: Acute respiratory failure with hypoxia    Subjective:   Waking up and following commands. Participated in PT. No fevers overnight  Review of Systems   Unable to obtain  Objective:     Vital Signs (Most Recent):  Temp: 98.3 °F (36.8 °C) (03/24/18 1500)  Pulse: (!) 116 (03/24/18 1500)  Resp: (!) 30 (03/24/18 1500)  BP: 135/64 (03/23/18 1032)  SpO2: 100 % (03/24/18 1500) Vital Signs (24h Range):  Temp:  [97.1 °F (36.2 °C)-98.9 °F (37.2 °C)] 98.3 °F (36.8 °C)  Pulse:  [113-123] 116  Resp:  [27-38] 30  SpO2:  [98 %-100 %] 100 %  Arterial Line BP: (105-151)/(48-73) 115/53     Weight: 71.5 kg (157 lb 10.1 oz)  Body mass index is 24.69 kg/m².      Intake/Output Summary (Last 24 hours) at 03/24/18 1507  Last data filed at 03/24/18 1500   Gross per 24 hour   Intake          5207.94 ml   Output             6427 ml   Net         -1219.06 ml       Physical Exam   Constitutional: He appears well-developed and well-nourished.    mechanical ventilation. Off sedation opens eyes and follows most commands    HENT:   Head: Normocephalic and atraumatic.   Neck: Neck supple. No tracheal deviation present. No thyromegaly present.   Cardiovascular:   tachycardic   Pulmonary/Chest: Effort normal.   On the vent    Abdominal: Soft.   Musculoskeletal: He exhibits no edema.   Neurological:   Awake and follows some commands   Skin: Skin is warm and dry.       Vents:  Vent Mode: A/C (03/24/18 1306)  Ventilator Initiated: Yes (03/14/18 0932)  Set Rate: 18 bmp (03/24/18 1306)  Vt Set: 410 mL (03/24/18 1306)  Pressure Support: 0 cmH20 (03/22/18 0909)  PEEP/CPAP: 5 cmH20 (03/24/18 1306)  Oxygen Concentration (%): 40 (03/24/18  1306)  Peak Airway Pressure: 32 cmH2O (03/24/18 1306)  Plateau Pressure: 30 cmH20 (03/24/18 0716)  Total Ve: 13.1 mL (03/24/18 1306)  F/VT Ratio<105 (RSBI): (!) 71.93 (03/24/18 1306)    Lines/Drains/Airways     Central Venous Catheter Line                 Trialysis (Dialysis) Catheter 03/23/18 1433 right internal jugular 1 day          Drain                 NG/OG Tube 03/14/18 1300 [facility] mouth 10 days          Airway                 Airway - Non-Surgical 03/14/18 0930 Endotracheal Tube 10 days          Arterial Line                 Arterial Line 03/14/18 1600 Right Femoral 9 days          Peripheral Intravenous Line                 Midline Catheter Insertion/Assessment  - Single Lumen 03/17/18 1454 Left cephalic vein (lateral side of arm) 18g x 8cm 7 days         Peripheral IV - Single Lumen 03/20/18 1640 Right Forearm 3 days                Significant Labs:    CBC/Anemia Profile:    Recent Labs  Lab 03/23/18  1550 03/24/18  0000 03/24/18  0818   WBC 7.48 7.46 7.88   HGB 6.1* 8.1* 8.4*   HCT 19.6* 24.6* 25.3*   PLT 92* 102* 118*   MCV 93 89 89   RDW 18.6* 19.0* 19.9*        Chemistries:    Recent Labs  Lab 03/23/18  0400  03/23/18  2201 03/24/18  0000 03/24/18  0400 03/24/18  0818 03/24/18  1354     144  < > 143  143  --  138  138  138  --  141  141   K 4.6  4.6  < > 4.0  4.0  --  4.5  4.5  4.5  --  4.6  4.6     104  < > 106  106  --  103  103  103  --  107  107   CO2 17*  17*  < > 23  23  --  21*  21*  21*  --  20*  20*   BUN 13  13  < > 13  13  --  8  8  8  --  9  9   CREATININE 1.0  1.0  < > 0.9  0.9  --  0.7  0.7  0.7  --  0.7  0.7   CALCIUM 8.3*  8.3*  < > 8.6*  8.6*  --  8.4*  8.4*  8.4*  --  8.3*  8.3*   ALBUMIN 2.8*  2.8*  < > 2.9*  2.9*  --  3.0*  3.0*  3.0*  --  2.6*  2.6*   PROT 6.2  --   --   --  6.6  --   --    BILITOT 2.3*  --   --   --  2.2*  --   --    ALKPHOS 201*  --   --   --  206*  --   --    ALT 37  --   --   --  33  --   --    AST 57*   --   --   --  43*  --   --    MG 1.8  < >  --  1.9  1.9 1.9 1.9  1.9  --    PHOS 3.8  < > 2.2*  2.2*  --  1.7*  1.7*  --  1.3*  1.3*   < > = values in this interval not displayed.        Assessment/Plan:   1. ARDS  2. CACHORRO  3. DIC      42 year-old male with a PMH of a heart transplant who was admitted to the hospital on 3/11 for diarrhea and cough. He was intubated on 3/14 for hypoxemic and hypercapnic resp failure. His CXR has improved. Off sedation, finally waking up more. No fevers overnight. Plan:    -Failed SBT again today today. RSBI 140. Keep off all sedation  -Check CK levels  -LTV ventilation.   -Need PT to sit up pt and do ROM daily  -CRRT (increasing UF rate today)  -Off all abs.  -DVT prophylaxis      Failed SBT. SBT in am again. He is severely deconditioned and aggressive PT will help us liberate him from the vent.    Geri Stringer MD  Critical Care - Medicine  [facility]

## 2018-03-24 NOTE — HOSPITAL COURSE
3/24  - more awake today but still failed extubation  - wife very apprehensive about possible trach  -3/24  - not making significant progress. Has failed SBP and pulmonary talked with family and patient about possible trach.   - developed hypotension this morning with CVP at 5.  CRRT changed to nightly only. Will resume tomorrow. Infectious romero negative  - placed back on low dose Levophed   4/22  -tacrolimus level high (12.9) from 11, still having nausea and TF residuals.   - Will check KUB and lipase. Gi consult on hold for now  5/14  - Off IV abx, Trach decanulated and undergoing HD. Has persistent nausea and isolated leukocytosis  5/15  - Paracentesis today  5/16  -waiting insurance approval for SNF.   5/17  - no acute issues. Appetite is better    5/18  Patient has had long complicated stay for this admission which was secondary of RSV infection. Soon after admission, patient decompensated into respiratory failure and septic shock. He was intubated, started on pressors and broad spectrum abx including antifungal (posazonazole) and antiviral. Patient also had a bronchoscopy that did not reveal any infectious agent. His RSV panel was positive for RSV. Patient continue to undergo CRRT but had labile BP that required occasional session skipping. He is also om midodrine started by nephrology.  He temporarily improved and was on vent wean trial when he gain developed septic shock, abx and pressors were again restarted. Hematology was consulted due to DIC and patient received FFP and cryoglobulin. During that time, he deloped peripheral LE digits ulcers thought to be secondary to embolic etiology. These ulcers are still present and were being managed conservatively by podiatry. Recommendation is to continue betadine treatment at rehab. He failed extubation after improving from second episode of septic shock and had to have tracheostomy placed. This was later decannulated and currently the stoma is healing well without  complication. He was fed through NG tube after PEG placement was discouraged secondary to present of ckd associated ascites and was able to transition to regular diet by D/C time. Patient had two paracentesis done during stay. He had lingering complain of anxiety and persistent nausea as well as labile glucose. Endocrine ascribed his nausea gastroparesis and tailored his insulin therapy as needed while pharmacy managed his heart transplant medication regime with adjustement dependent on the stage of infection. He developed prolonged hospitalization associated debility requiring in patient rehabilitation facility. Issues of insurance were addressed by  abd with final acceptance to select rehab. He will continue to have HD and PT/OT and fu at [facility] clinic as scheduled.

## 2018-03-25 PROBLEM — R57.9 SHOCK, UNSPECIFIED: Status: ACTIVE | Noted: 2018-01-01

## 2018-03-25 NOTE — ASSESSMENT & PLAN NOTE
- TTE 3/12/18 showed LVEF 50-55% (but no substantial change when compared to previous echo), RV normal and IVC 8  - Had -ve DSE on 11/30/17  - finished a course of challenged hydrocortisone   - Tacro resumed at 2 mg bid. Level at 10.1.  Myfortic remains on hold

## 2018-03-25 NOTE — PROGRESS NOTES
Patient's blood sugar continuing to rise. Notified MD Walsh. Received order for patient's levo to be changed to 32 mg max concentration in NS to avoid excess fluid and to help with blood sugar control. Patient remains on insulin drip. Notified MD Stringer that patient has not been urinating. Received order for urinalysis to be cancelled.

## 2018-03-25 NOTE — SUBJECTIVE & OBJECTIVE
Interval History:     Patient very weak and unable to tolerate SBP trials. Had hypotension during CRRT which was attributed to significant Uf. Sessions changed to nightly and to start tomorrow. Infectious romero negative. Family had a discussion with pulmonary that he may need a trach if cant extubate. tacro level 10.1 today. Contacted ID Who will see patient tomorrow and advised on starting patient on Zosyn and vanco x1 for tonight and they evaluate tomorrow    Continuous Infusions:   sodium chloride 0.9% 200 mL/hr at 03/24/18 2045    sodium chloride 0.9% 200 mL/hr at 03/25/18 0800    dexmedetomidine (PRECEDEX) infusion Stopped (03/21/18 0750)    insulin (HUMAN R) infusion (adults) 2.3 Units/hr (03/25/18 1300)    norepinephrine bitartrate-D5W 0.1 mcg/kg/min (03/25/18 1300)     Scheduled Meds:   chlorhexidine  15 mL Mouth/Throat BID    docusate  100 mg Per NG tube BID    famotidine (PF)  20 mg Intravenous BID    heparin (porcine)  5,000 Units Subcutaneous Q12H    levothyroxine  125 mcg Per NG tube Before breakfast    metoclopramide HCl  5 mg Intravenous Q6H    polyethylene glycol  17 g Per NG tube Daily    posaconazole 200 mg/5ml (40 mg/ml)  200 mg Per NG tube TID WM    sodium chloride 0.9%  3 mL Intravenous Q8H    tacrolimus  2 mg Oral BID     PRN Meds:sodium chloride, sodium chloride, sodium chloride, acetaminophen, dextrose 50%, dextrose 50%, glucagon (human recombinant), glucose, glucose, k phos di & mono-sod phos mono    Review of patient's allergies indicates:   Allergen Reactions    No known drug allergies      Objective:     Vital Signs (Most Recent):  Temp: 99.7 °F (37.6 °C) (03/25/18 1100)  Pulse: (!) 132 (03/25/18 1309)  Resp: (!) 31 (03/25/18 1300)  BP: (!) 106/58 (03/25/18 1300)  SpO2: 100 % (03/25/18 1309) Vital Signs (24h Range):  Temp:  [97.9 °F (36.6 °C)-99.7 °F (37.6 °C)] 99.7 °F (37.6 °C)  Pulse:  [114-132] 132  Resp:  [27-33] 31  SpO2:  [100 %] 100 %  BP: ()/(52-58)  106/58  Arterial Line BP: ()/(40-72) 116/50     Patient Vitals for the past 72 hrs (Last 3 readings):   Weight   03/25/18 0155 64.7 kg (142 lb 10.2 oz)     Body mass index is 22.34 kg/m².      Intake/Output Summary (Last 24 hours) at 03/25/18 1410  Last data filed at 03/25/18 1300   Gross per 24 hour   Intake          4804.04 ml   Output             6038 ml   Net         -1233.96 ml       Hemodynamic Parameters:       Telemetry:     Physical Exam     Eyes: Pupils are equal, round, and reactive to light.   Neck: No JVD present.   Cardiovascular: Exam reveals no gallop and no friction rub.    No murmur heard.  tachycardia   Pulmonary/Chest: Effort normal and breath sounds normal.   Abdominal: Soft. Bowel sounds are normal. He exhibits no distension. There is no tenderness.   Musculoskeletal: He exhibits no edema or tenderness.   Neurological:   Mildly responding to command and noxious stimuli   Skin: Skin is warm and dry.   Nursing note and vitals reviewed.    Significant Labs:  CBC:    Recent Labs  Lab 03/24/18  1529 03/24/18  2355 03/25/18  0739   WBC 4.52 3.62* 3.18*   RBC 2.52* 2.42* 2.40*   HGB 7.3* 7.1* 7.2*   HCT 22.2* 21.8* 21.8*   PLT 89* 88* 104*   MCV 88 90 91   MCH 29.0 29.3 30.0   MCHC 32.9 32.6 33.0     BNP:  No results for input(s): BNP in the last 168 hours.    Invalid input(s): BNPTRIAGELBLO  CMP:    Recent Labs  Lab 03/23/18  0400  03/24/18  0400 03/24/18  1354 03/24/18  2217 03/25/18  0400   *  222*  < > 158*  158*  158* 202*  202* 221*  221*  221* 178*  178*  178*  178*   CALCIUM 8.3*  8.3*  < > 8.4*  8.4*  8.4* 8.3*  8.3* 8.4*  8.4*  8.4* 8.3*  8.3*  8.3*  8.3*   ALBUMIN 2.8*  2.8*  < > 3.0*  3.0*  3.0* 2.6*  2.6* 2.7*  2.7*  2.7* 2.8*  2.8*  2.8*  2.8*   PROT 6.2  --  6.6  --   --  6.6     144  < > 138  138  138 141  141 140  140  140 140  140  140  140   K 4.6  4.6  < > 4.5  4.5  4.5 4.6  4.6 4.6  4.6  4.6 4.5  4.5  4.5  4.5   CO2  17*  17*  < > 21*  21*  21* 20*  20* 23  23  23 19*  19*  19*  19*     104  < > 103  103  103 107  107 106  106  106 108  108  108  108   BUN 13  13  < > 8  8  8 9  9 8  8  8 6  6  6  6   CREATININE 1.0  1.0  < > 0.7  0.7  0.7 0.7  0.7 0.7  0.7  0.7 0.5  0.5  0.5  0.5   ALKPHOS 201*  --  206*  --   --  214*   ALT 37  --  33  --   --  27   AST 57*  --  43*  --   --  37   BILITOT 2.3*  --  2.2*  --   --  1.6*   < > = values in this interval not displayed.   Coagulation:     Recent Labs  Lab 03/23/18  1550  03/24/18  1529 03/24/18  2355 03/25/18  0739   INR 1.4*  < > 1.2 1.1 1.1   APTT 25.5  --   --   --   --    < > = values in this interval not displayed.  LDH:    Recent Labs  Lab 03/23/18  1550   *     Microbiology:  Microbiology Results (last 7 days)     Procedure Component Value Units Date/Time    Blood culture [[id number]]     Order Status:  No result Specimen:  Blood     Blood culture [[id number]]     Order Status:  No result Specimen:  Blood     Culture, Respiratory with Gram Stain [[id number]]     Order Status:  No result Specimen:  Respiratory     Urine culture [[id number]]     Order Status:  No result Specimen:  Urine     Blood culture [[id number]] Collected:  03/21/18 0815    Order Status:  Completed Specimen:  Blood from Peripheral, Forearm, Left Updated:  03/25/18 1012     Blood Culture, Routine No Growth to date     Blood Culture, Routine No Growth to date     Blood Culture, Routine No Growth to date     Blood Culture, Routine No Growth to date     Blood Culture, Routine No Growth to date    Blood culture [[id number]] Collected:  03/18/18 0748    Order Status:  Completed Specimen:  Blood from Peripheral, Hand, Right Updated:  03/23/18 1012     Blood Culture, Routine No growth after 5 days.    Blood culture [[id number]] Collected:  03/18/18 0618    Order Status:  Completed Specimen:  Blood from Peripheral, Hand, Left Updated:  03/21/18 1108     Blood Culture,  Routine Gram stain aer bottle: Gram positive cocci in clusters resembling Staph      Blood Culture, Routine Results called to and read back by:Omid Cody RN 03/19/2018  10:55     Blood Culture, Routine --     COAGULASE-NEGATIVE STAPHYLOCOCCUS SPECIES  Organism is a probable contaminant      Blood culture [[id number]] Collected:  03/13/18 1637    Order Status:  Completed Specimen:  Blood from Peripheral, Antecubital, Left Updated:  03/18/18 2012     Blood Culture, Routine No growth after 5 days.    Blood culture [[id number]] Collected:  03/13/18 1701    Order Status:  Completed Specimen:  Blood from Peripheral, Forearm, Left Updated:  03/18/18 2012     Blood Culture, Routine No growth after 5 days.          I have reviewed all pertinent labs within the past 24 hours.      Estimated Creatinine Clearance: 172.5 mL/min (based on SCr of 0.5 mg/dL).    Diagnostic Results:

## 2018-03-25 NOTE — ASSESSMENT & PLAN NOTE
- resumed pressors (levophed) today  - Infectious romero negative so far (CXR- negative).  - Spoke with ID (jacqui) who advised to start patient on Zosyn and x1 vanco today. They will see patient tomorrow.  - Only on antifungal

## 2018-03-25 NOTE — PROGRESS NOTES
Patient's blood pressure on steven reading 90/40s with MAP of 56. Cuff pressure reading 87/50. Notified Dr. Walsh. Rinsed back CRRT per MD order. WCTM.    0758: Pressure continues to run 90/50 with MAPs in the low to mid 50s (53-56). Notified MD Walsh. WCTM.

## 2018-03-25 NOTE — PROGRESS NOTES
[facility]  Heart Transplant  Progress Note    Patient Name: Lv Crocker  MRN: [id number]  Admission Date: 3/11/2018  Hospital Length of Stay: 13 days  Attending Physician: Jose A Lloyd MD  Primary Care Provider: Philippe Mohr MD  Principal Problem:Acute respiratory failure with hypoxia    Subjective:     Interval History:     Patient very weak and unable to tolerate SBP trials. Had hypotension during CRRT which was attributed to significant Uf. Sessions changed to nightly and to start tomorrow. Infectious romero negative. Family had a discussion with pulmonary that he may need a trach if cant extubate. tacro level 10.1 today. Contacted ID Who will see patient tomorrow and advised on starting patient on Zosyn and vanco x1 for tonight and they evaluate tomorrow    Continuous Infusions:   sodium chloride 0.9% 200 mL/hr at 03/24/18 2045    sodium chloride 0.9% 200 mL/hr at 03/25/18 0800    dexmedetomidine (PRECEDEX) infusion Stopped (03/21/18 0750)    insulin (HUMAN R) infusion (adults) 2.3 Units/hr (03/25/18 1300)    norepinephrine bitartrate-D5W 0.1 mcg/kg/min (03/25/18 1300)     Scheduled Meds:   chlorhexidine  15 mL Mouth/Throat BID    docusate  100 mg Per NG tube BID    famotidine (PF)  20 mg Intravenous BID    heparin (porcine)  5,000 Units Subcutaneous Q12H    levothyroxine  125 mcg Per NG tube Before breakfast    metoclopramide HCl  5 mg Intravenous Q6H    polyethylene glycol  17 g Per NG tube Daily    posaconazole 200 mg/5ml (40 mg/ml)  200 mg Per NG tube TID WM    sodium chloride 0.9%  3 mL Intravenous Q8H    tacrolimus  2 mg Oral BID     PRN Meds:sodium chloride, sodium chloride, sodium chloride, acetaminophen, dextrose 50%, dextrose 50%, glucagon (human recombinant), glucose, glucose, k phos di & mono-sod phos mono    Review of patient's allergies indicates:   Allergen Reactions    No known drug allergies      Objective:     Vital Signs (Most Recent):  Temp:  99.7 °F (37.6 °C) (03/25/18 1100)  Pulse: (!) 132 (03/25/18 1309)  Resp: (!) 31 (03/25/18 1300)  BP: (!) 106/58 (03/25/18 1300)  SpO2: 100 % (03/25/18 1309) Vital Signs (24h Range):  Temp:  [97.9 °F (36.6 °C)-99.7 °F (37.6 °C)] 99.7 °F (37.6 °C)  Pulse:  [114-132] 132  Resp:  [27-33] 31  SpO2:  [100 %] 100 %  BP: ()/(52-58) 106/58  Arterial Line BP: ()/(40-72) 116/50     Patient Vitals for the past 72 hrs (Last 3 readings):   Weight   03/25/18 0155 64.7 kg (142 lb 10.2 oz)     Body mass index is 22.34 kg/m².      Intake/Output Summary (Last 24 hours) at 03/25/18 1410  Last data filed at 03/25/18 1300   Gross per 24 hour   Intake          4804.04 ml   Output             6038 ml   Net         -1233.96 ml       Hemodynamic Parameters:       Telemetry:     Physical Exam     Eyes: Pupils are equal, round, and reactive to light.   Neck: No JVD present.   Cardiovascular: Exam reveals no gallop and no friction rub.    No murmur heard.  tachycardia   Pulmonary/Chest: Effort normal and breath sounds normal.   Abdominal: Soft. Bowel sounds are normal. He exhibits no distension. There is no tenderness.   Musculoskeletal: He exhibits no edema or tenderness.   Neurological:   Mildly responding to command and noxious stimuli   Skin: Skin is warm and dry.   Nursing note and vitals reviewed.    Significant Labs:  CBC:    Recent Labs  Lab 03/24/18  1529 03/24/18  2355 03/25/18  0739   WBC 4.52 3.62* 3.18*   RBC 2.52* 2.42* 2.40*   HGB 7.3* 7.1* 7.2*   HCT 22.2* 21.8* 21.8*   PLT 89* 88* 104*   MCV 88 90 91   MCH 29.0 29.3 30.0   MCHC 32.9 32.6 33.0     BNP:  No results for input(s): BNP in the last 168 hours.    Invalid input(s): BNPTRIAGELBLO  CMP:    Recent Labs  Lab 03/23/18  0400  03/24/18  0400 03/24/18  1354 03/24/18  2217 03/25/18  0400   *  222*  < > 158*  158*  158* 202*  202* 221*  221*  221* 178*  178*  178*  178*   CALCIUM 8.3*  8.3*  < > 8.4*  8.4*  8.4* 8.3*  8.3* 8.4*  8.4*  8.4* 8.3*   8.3*  8.3*  8.3*   ALBUMIN 2.8*  2.8*  < > 3.0*  3.0*  3.0* 2.6*  2.6* 2.7*  2.7*  2.7* 2.8*  2.8*  2.8*  2.8*   PROT 6.2  --  6.6  --   --  6.6     144  < > 138  138  138 141  141 140  140  140 140  140  140  140   K 4.6  4.6  < > 4.5  4.5  4.5 4.6  4.6 4.6  4.6  4.6 4.5  4.5  4.5  4.5   CO2 17*  17*  < > 21*  21*  21* 20*  20* 23  23  23 19*  19*  19*  19*     104  < > 103  103  103 107  107 106  106  106 108  108  108  108   BUN 13  13  < > 8  8  8 9  9 8  8  8 6  6  6  6   CREATININE 1.0  1.0  < > 0.7  0.7  0.7 0.7  0.7 0.7  0.7  0.7 0.5  0.5  0.5  0.5   ALKPHOS 201*  --  206*  --   --  214*   ALT 37  --  33  --   --  27   AST 57*  --  43*  --   --  37   BILITOT 2.3*  --  2.2*  --   --  1.6*   < > = values in this interval not displayed.   Coagulation:     Recent Labs  Lab 03/23/18  1550  03/24/18  1529 03/24/18  2355 03/25/18  0739   INR 1.4*  < > 1.2 1.1 1.1   APTT 25.5  --   --   --   --    < > = values in this interval not displayed.  LDH:    Recent Labs  Lab 03/23/18  1550   *     Microbiology:  Microbiology Results (last 7 days)     Procedure Component Value Units Date/Time    Blood culture [[id number]]     Order Status:  No result Specimen:  Blood     Blood culture [[id number]]     Order Status:  No result Specimen:  Blood     Culture, Respiratory with Gram Stain [[id number]]     Order Status:  No result Specimen:  Respiratory     Urine culture [[id number]]     Order Status:  No result Specimen:  Urine     Blood culture [[id number]] Collected:  03/21/18 0815    Order Status:  Completed Specimen:  Blood from Peripheral, Forearm, Left Updated:  03/25/18 1012     Blood Culture, Routine No Growth to date     Blood Culture, Routine No Growth to date     Blood Culture, Routine No Growth to date     Blood Culture, Routine No Growth to date     Blood Culture, Routine No Growth to date    Blood culture [[id number]] Collected:  03/18/18  0748    Order Status:  Completed Specimen:  Blood from Peripheral, Hand, Right Updated:  03/23/18 1012     Blood Culture, Routine No growth after 5 days.    Blood culture [[id number]] Collected:  03/18/18 0618    Order Status:  Completed Specimen:  Blood from Peripheral, Hand, Left Updated:  03/21/18 1108     Blood Culture, Routine Gram stain aer bottle: Gram positive cocci in clusters resembling Staph      Blood Culture, Routine Results called to and read back by:Omid Cody RN 03/19/2018  10:55     Blood Culture, Routine --     COAGULASE-NEGATIVE STAPHYLOCOCCUS SPECIES  Organism is a probable contaminant      Blood culture [[id number]] Collected:  03/13/18 1637    Order Status:  Completed Specimen:  Blood from Peripheral, Antecubital, Left Updated:  03/18/18 2012     Blood Culture, Routine No growth after 5 days.    Blood culture [[id number]] Collected:  03/13/18 1701    Order Status:  Completed Specimen:  Blood from Peripheral, Forearm, Left Updated:  03/18/18 2012     Blood Culture, Routine No growth after 5 days.          I have reviewed all pertinent labs within the past 24 hours.      Estimated Creatinine Clearance: 172.5 mL/min (based on SCr of 0.5 mg/dL).    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 Most recently has had ongoing issues with his lungs, had gotten 2 thoracenteses, after which he underwent VATS 01/19/18 followed by pleurex catheter placement and effusion drainage 01/11/18, subsequently had it removed 02/07/18 after drainage had decreased despite multiple attempts to drain it. Has been having significant coughing fits that result in him being near-syncopal at times, although difficult to say if he has passed out or not- patient most recently was admitted to the hospital for  increased AGUILAR, coughing and pre-syncope 02/11-02/14/18 at [facility].   Patient was started on antibiotics for suspected bacterial infection, with some diarrhea, bronchitis, and possible pneumonia. Ct chest showed some pleural fluid collection and after talking with Dr. James, we arranged for him to receive a diagnostic tap with IR, from which the fluid collection demonstrated no growth or significant findings at all really. Cell counts do not appear to have been sent but will recheck. Patient states since his hospital stay, yesterday had a significant coughing fit again, after which he nearly passed out, is being seen in clinic today for this. Denies any cardiopulmonary complaints, no leg swelling, has some abdominal swelling, which is moderate for him. Denies significant shortness of breath with mild exertion, had 6MWT yesterday to see if he qualified for home O2, and his O2 sats actually improved after recovery from where he started initially. He and his wife admit he is in bed most days, not very active.     Mr. Crocker presented to the ED 3/11/18 with approximately 3 weeks of worsening diarrhea and 2-3 days of sinus pressure, drainage, and worsened cough.  He notes that he had a coughing fit last night and passed out, coughed throughout most of the night.  This also happened on 2/25/18.  He last saw Dr Ferreira in clinic on 2/20/18 where he was taken off myfortic and switched to cellcept (he hadn't been on cellcept because of leukopenia when they tried post-transplant).  Though his diarrhea had improved after his last discharge, he states it has increased now to 15x/day, clear/yellow/green, no fevers, no exacerbating foods per say.  He was taken back off the cellcept and placed back on myfortic this past week and has not noticed immediate change in diarrhea. He also reports his baseline coughing fits havent improved and are minimally responsive to tessalon pearls.  Came on last night, he lost consciousness during a  fit once which is relatively normal for him, and had two more during HPI.  He notes no sick contacts but does state he's had some URI symptoms as above.  His baseline vitals are usually -120 and BP 90s/60s-110s/70s.  He reports a history of intermittent diarrhea - did have Cdiff many years ago but this smells different.  No abdominal pain, no nausea, no vomiting.  No blood in diarrhea.  Appears last c-scope in 2015 with no evidence of infection or inflammatory processes.  He does report IBS which is different that this.       * Acute respiratory failure with hypoxia    -S/P Bronch and intubation 3/14. Cultures ngtd.   -RSV positive. Completed course of Ribavarin/IVIG. Per lung transplant protocol for severe RSV(given myelosuppression).   -Aspergill Ag neg from BAL.   -Urine histo/blasto antigens neg, crypto antigen neg, urine legionella neg, and Quantiferon pending.  -Continue empiric posaconazole for now (transitioned to per tube to avoid accumulation to cyclodextrin carrier) with plan to stop after 10 days of -ve fungal cultures from BAL  -Stopped IV GCV prophylaxis as patient is several years out from transplant without evidence of active CMV disease; will check weekly quants -Await pending tests including: final BAL cultures and Quantiferon   - Off sedation and now following commands. Continue to fail SBP and may need a trach. Discussed about possible trach with wife today.         Type 1 diabetes mellitus with hyperglycemia    - on insulin gtt        Shock, unspecified    - resumed pressors (levophed) today  - Infectious romero negative so far (CXR- negative).  - Spoke with ID (jacqui) who advised to start patient on Zosyn and x1 vanco today. They will see patient tomorrow.  - Only on antifungal          DIC (disseminated intravascular coagulation)    - resolved at the moment  - secondary to sepsis on admission  - DIC labs including FSP, platelets negative for ongoing status  - cryoglobulin and FFP to be given  if needed to keep fibrinogen above 100 and INR below 1.8  - plateles level at 100 today, INR 1.1  - Continue to check fibrinogen, INR and Plts level Q8h.            Heart transplanted    - TTE 3/12/18 showed LVEF 50-55% (but no substantial change when compared to previous echo), RV normal and IVC 8  - Had -ve DSE on 11/30/17  - finished a course of challenged hydrocortisone   - Tacro resumed at 2 mg bid. Level at 10.1.  Myfortic remains on hold        Acute renal failure superimposed on stage 4 chronic kidney disease    - Appreciate Neph Cx.   - Continue CRRT nightly from tomorrow due to hypotension         Pancytopenia    - Appreciate Hem Cx.   -The patient developed thrombocytopenia 5 days after receiving enoxaparin and has a 4T score of 5 indicating a ~ 14% chance of HIT.  The patients HIT panel was positive with OD of 0.431 indicating a 1-5% chance of having HIT.  Would wait for HILDA before considering anticoagulation with argatroban. HILDA pending.    -The patients thrombocytopenia could be drug induced as well.   -The patient has a low fibrinogen, elevated INR, shistocytes on peripheral smear and elevated LDH which all point towards DIC. The haptoglobin is elevated which goes against hemolytic anemia,; however, haptoglobin is also an acute phase reactant.  As DIC is the higher on the differential would treat as such by treating underlying cause which is likely sepsis.       -Fibrinogen, INR and plt count every 8 hours.  -Give cryoprecipitate to get fibrinogen over 100.  Would give one unit at a time.  -Give FFP to get the INR below 1.8.  Would give at least two units at a time.  May consider giving the patient Vitamin K given recent use of coumadin.          Ileus    -Had 2 BMs 3/20.  -No residuals now. Will advance tube feeds with goal 50 cc/hr  - having BM per wife        Restrictive cardiomyopathy    -S/P thoracentesis X 2, followed by VATS/pleurex cath placement (January 2018) with removal of pleurex  (February 2018) and 3rd thoracentesis 2/14/18 with no significant findings on fluid removal. Moderate right pleural effusion stable by CXR 3/11/18 and chest CT 3/13/18  -Last paracentesis was in January. Abd US 3/12/18 confirmed ongoing ascites (not tense at all). Will consider getting paracentesis this admit          Type 1 diabetes mellitus with stage 3 chronic kidney disease    - Appreciate Endocrine's recs        Hypothyroidism due to Hashimoto's thyroiditis    - TSH low at 0.101 with normal FT4. Appreciate Endocrine's recs        Anemia    - transfused one PRB yesterday.  -hb today us 8.1          Discussed with Dr. ranjit Walsh MD  Heart Transplant  [facility]

## 2018-03-25 NOTE — ASSESSMENT & PLAN NOTE
- resolved at the moment  - secondary to sepsis on admission  - DIC labs including FSP, platelets negative for ongoing status  - cryoglobulin and FFP to be given if needed to keep fibrinogen above 100 and INR below 1.8  - plateles level at 100 today, INR 1.1  - Continue to check fibrinogen, INR and Plts level Q8h.

## 2018-03-25 NOTE — PLAN OF CARE
Problem: Patient Care Overview  Goal: Plan of Care Review  Outcome: Ongoing (interventions implemented as appropriate)  No acute events throughout day. See vital signs and assessments in flowsheets. See below for updates on today's progress.     Pulmonary: Patient remains on the vent with FiO2 of 40%, Peep of 5, RR of 18 and tidal volume of 410. Patient was not following commands consistently today to be extubated. Sats are currently 100%.    Cardiovascular: Patient's blood pressure dropped this morning to 80/50 with a MAP in the low 50s. CRRT rinsed back. Patient started on levophed to increase pressure. Changed levophed from 4 mg in 250 D5 to 32 mg in 250 NS as patient's blood sugars were in the 300s while on the D5 drip. HR tachycardic at 133.     Neurological: Intermittently will follow commands like wiggling toes. Less responsive than yesterday afternoon.    Gastrointestinal: No BM this shift.     Genitourinary: Anuric.    Endocrine: Hyperglycemic (see above r/t cardio).    Integumentary/Other: Patient feels warm to the touch. Highest temperature 100.1. Patient's wife would like for him to be started back on home dose of lexapro.    Infusions: Levo and insulin currently infusing.    Patient progressing towards goals as tolerated, plan of care communicated and reviewed with Lv Crocker and family. All concerns addressed. Will continue to monitor.

## 2018-03-25 NOTE — ASSESSMENT & PLAN NOTE
-S/P Bronch and intubation 3/14. Cultures ngtd.   -RSV positive. Completed course of Ribavarin/IVIG. Per lung transplant protocol for severe RSV(given myelosuppression).   -Aspergill Ag neg from BAL.   -Urine histo/blasto antigens neg, crypto antigen neg, urine legionella neg, and Quantiferon pending.  -Continue empiric posaconazole for now (transitioned to per tube to avoid accumulation to cyclodextrin carrier) with plan to stop after 10 days of -ve fungal cultures from BAL  -Stopped IV GCV prophylaxis as patient is several years out from transplant without evidence of active CMV disease; will check weekly quants -Await pending tests including: final BAL cultures and Quantiferon   - Off sedation and now following commands. Continue to fail SBP and may need a trach. Discussed about possible trach with wife today.

## 2018-03-25 NOTE — PROGRESS NOTES
[facility]  Pul/Critical Care - Medicine  Progress Note    Patient Name: Lv Crocker  MRN: [id number]  Admission Date: 3/11/2018  Hospital Length of Stay: 13 days  Code Status: Full Code  Attending Provider: Jose A Lloyd MD  Primary Care Provider: Philippe Mohr MD   Principal Problem: Acute respiratory failure with hypoxia    Subjective:   Overnight, he become more hypotensive that required started vasopressors. Back. The patient has had severe weakness of his ext. Follows some simple commands intermittently. Wife and family at bedside.   Review of Systems   Unable to obtain  Objective:     Vital Signs (Most Recent):  Temp: 99.7 °F (37.6 °C) (03/25/18 1100)  Pulse: (!) 132 (03/25/18 1309)  Resp: (!) 31 (03/25/18 1300)  BP: (!) 106/58 (03/25/18 1300)  SpO2: 100 % (03/25/18 1309) Vital Signs (24h Range):  Temp:  [97.9 °F (36.6 °C)-99.7 °F (37.6 °C)] 99.7 °F (37.6 °C)  Pulse:  [114-132] 132  Resp:  [27-33] 31  SpO2:  [100 %] 100 %  BP: ()/(52-58) 106/58  Arterial Line BP: ()/(40-72) 116/50     Weight: 64.7 kg (142 lb 10.2 oz)  Body mass index is 22.34 kg/m².      Intake/Output Summary (Last 24 hours) at 03/25/18 1344  Last data filed at 03/25/18 1300   Gross per 24 hour   Intake          5055.32 ml   Output             6227 ml   Net         -1171.68 ml       Physical Exam   Constitutional: He appears well-developed.    On mechanical ventilation. Eyes open and intermittently follows commands   HENT:   Head: Normocephalic and atraumatic.   Neck: Neck supple.   Cardiovascular: Regular rhythm.    tachycardic   Pulmonary/Chest: Effort normal.   On the vent--mechanical BS heard   Abdominal: Soft.   Neurological:   Awake and follows some commands   Skin: Skin is warm and dry.       Vents:  Vent Mode: A/C (03/25/18 1309)  Ventilator Initiated: Yes (03/14/18 0932)  Set Rate: 18 bmp (03/25/18 1309)  Vt Set: 410 mL (03/25/18 1309)  Pressure Support: 0 cmH20 (03/22/18  0909)  PEEP/CPAP: 5 cmH20 (03/25/18 1309)  Oxygen Concentration (%): 40 (03/25/18 1309)  Peak Airway Pressure: 33 cmH2O (03/25/18 1309)  Plateau Pressure: 30 cmH20 (03/24/18 0716)  Total Ve: 12.3 mL (03/25/18 1309)  F/VT Ratio<105 (RSBI): (!) 63.53 (03/25/18 0516)    Lines/Drains/Airways     Central Venous Catheter Line                 Trialysis (Dialysis) Catheter 03/23/18 1433 right internal jugular 1 day          Drain                 NG/OG Tube 03/14/18 1300 [facility] mouth 11 days          Airway                 Airway - Non-Surgical 03/14/18 0930 Endotracheal Tube 11 days          Arterial Line                 Arterial Line 03/14/18 1600 Right Femoral 10 days          Peripheral Intravenous Line                 Midline Catheter Insertion/Assessment  - Single Lumen 03/17/18 1454 Left cephalic vein (lateral side of arm) 18g x 8cm 7 days         Peripheral IV - Single Lumen 03/20/18 1640 Right Forearm 4 days                Significant Labs:    CBC/Anemia Profile:    Recent Labs  Lab 03/24/18  1529 03/24/18  2355 03/25/18  0739   WBC 4.52 3.62* 3.18*   HGB 7.3* 7.1* 7.2*   HCT 22.2* 21.8* 21.8*   PLT 89* 88* 104*   MCV 88 90 91   RDW 20.1* 19.9* 20.4*        Chemistries:    Recent Labs  Lab 03/24/18  0400  03/24/18  1354  03/24/18  2217 03/25/18  0400 03/25/18  0739     138  138  --  141  141  --  140  140  140 140  140  140  140  --    K 4.5  4.5  4.5  --  4.6  4.6  --  4.6  4.6  4.6 4.5  4.5  4.5  4.5  --      103  103  --  107  107  --  106  106  106 108  108  108  108  --    CO2 21*  21*  21*  --  20*  20*  --  23  23  23 19*  19*  19*  19*  --    BUN 8  8  8  --  9  9  --  8  8  8 6  6  6  6  --    CREATININE 0.7  0.7  0.7  --  0.7  0.7  --  0.7  0.7  0.7 0.5  0.5  0.5  0.5  --    CALCIUM 8.4*  8.4*  8.4*  --  8.3*  8.3*  --  8.4*  8.4*  8.4* 8.3*  8.3*  8.3*  8.3*  --    ALBUMIN 3.0*  3.0*  3.0*  --  2.6*  2.6*  --  2.7*  2.7*  2.7*  2.8*  2.8*  2.8*  2.8*  --    PROT 6.6  --   --   --   --  6.6  --    BILITOT 2.2*  --   --   --   --  1.6*  --    ALKPHOS 206*  --   --   --   --  214*  --    ALT 33  --   --   --   --  27  --    AST 43*  --   --   --   --  37  --    MG 1.9  < >  --   < > 1.8 1.9 1.8  1.8  1.8   PHOS 1.7*  1.7*  --  1.3*  1.3*  --  2.1*  2.1*  2.1* 1.2*  1.2*  1.2*  --    < > = values in this interval not displayed.        Assessment/Plan:   1. ARDS  2. CACHORRO  3. DIC      42 year-old male with a PMH of a heart transplant who was admitted to the hospital on 3/11 for diarrhea and cough. He was intubated on 3/14 for hypoxemic and hypercapnic resp failure. His chest imaging has remained stable over the past few days. He has developed severe weakness of his ext that could be related to the critical illness, steroids and drugs (ribavirin). He has developed new shock once again overnight andhe is back on vasopressors. Given that he continues to have considerable weakness and is failing daily SBTs, we have started to have discussions about trach in the near future should he fail to dramatically improve over the next 2-3 days . Plan:    -Repeat 2-D echo to assess cardiac function.  -Repeat infectious workup for new shock. Discussed with Dr. Lloyd about empiric abs. They will consult with ID team  -LTV ventilation.   -CRRT  -Discussion about possible trach in the near future  -DVT prophylaxis    No SBT today because of new shock. Will need to fully workup again.    Geri Stringer MD  Critical Care - Medicine  [facility]

## 2018-03-25 NOTE — PROGRESS NOTES
"[facility]  Endocrinology  Progress Note    Admit Date: 3/11/2018     Reason for Consult: abnormal TFTs    Surgical Procedure and Date: s/p heart transplant 2014     Diabetes diagnosis year: 7yo (T1DM)     Home Diabetes Medications:    Levemir 15u qHS  Novolog 4u AC     How often checking glucose at home? 4 times daily   BG readings on regimen: 150-200s  Hypoglycemia on the regimen?  Yes, rarely - treats appropriately (light snack, glucose tab)  Missed doses on regimen?  No        Diabetes Complications include:     Hyperglycemia, Hypoglycemia  and Diabetic chronic kidney disease          Complicating diabetes co morbidities:   N/A     HPI:   Patient is a 42 y.o. male with a diagnosis of T1DM, HTN, hypothyroidism, s/p heart transplant.  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 He required serial paracentesis and thoracentesis until he underwent a VATS with pleurX catheter placement on 1/11/2018 and removed 2/7/18.       Presented to hospital secondary to diarrhea and cough.  Upon initial labs, TSH was decreased (0.101) and free T4 1.33.  Endocrinology consulted to assist in management of these labs.  He was last seen in clinic by Kamala Vázquez NP 11/2017.   Previous hospitalization, endocrine consulted for same problem.  During that visit, recommended decreasing LT4 to 125mcg daily. Unfortunately, patient was discharged on previous dose of 150mcg daily.     Interval HPI:   Overnight events:  Failure to wean vent, pulm on board.   BG at goal continues on TF  CRRT interrupted due to hypotension   More awake and alert    BP (!) 90/55 (BP Location: Right arm, Patient Position: Lying)   Pulse (!) 123   Temp 97.9 °F (36.6 °C) (Axillary)   Resp (!) 31   Ht 5' 7" (1.702 m)   Wt 64.7 kg (142 lb 10.2 oz)   SpO2 100%   BMI 22.34 kg/m²       Labs Reviewed and Include      Recent Labs  Lab " 03/25/18  0400   *  178*  178*  178*   CALCIUM 8.3*  8.3*  8.3*  8.3*   ALBUMIN 2.8*  2.8*  2.8*  2.8*   PROT 6.6     140  140  140   K 4.5  4.5  4.5  4.5   CO2 19*  19*  19*  19*     108  108  108   BUN 6  6  6  6   CREATININE 0.5  0.5  0.5  0.5   ALKPHOS 214*   ALT 27   AST 37   BILITOT 1.6*     Lab Results   Component Value Date    WBC 3.18 (L) 03/25/2018    HGB 7.2 (L) 03/25/2018    HCT 21.8 (L) 03/25/2018    MCV 91 03/25/2018     (L) 03/25/2018     No results for input(s): TSH, FREET4 in the last 168 hours.  Lab Results   Component Value Date    HGBA1C 6.9 (H) 02/11/2018       Nutritional status:   Body mass index is 22.34 kg/m².  Lab Results   Component Value Date    ALBUMIN 2.8 (L) 03/25/2018    ALBUMIN 2.8 (L) 03/25/2018    ALBUMIN 2.8 (L) 03/25/2018    ALBUMIN 2.8 (L) 03/25/2018     Lab Results   Component Value Date    PREALBUMIN 5 (L) 03/15/2018    PREALBUMIN 18 (L) 03/24/2015    PREALBUMIN 19 (L) 12/17/2014       Estimated Creatinine Clearance: 172.5 mL/min (based on SCr of 0.5 mg/dL).    Accu-Checks  Recent Labs      03/24/18   1156  03/24/18   1402  03/24/18   1628  03/24/18   1759  03/24/18   2009  03/24/18   2214  03/25/18   0000  03/25/18   0158  03/25/18   0354  03/25/18   0600   POCTGLUCOSE  161*  189*  161*  201*  247*  202*  219*  199*  174*  158*       Current Medications and/or Treatments Impacting Glycemic Control  Immunotherapy:  Immunosuppressants         Stop Route Frequency     tacrolimus capsule 2 mg      -- Oral 2 times daily        Steroids:   Hormones     None        Pressors:    Autonomic Drugs     Start     Stop Route Frequency Ordered    03/25/18 0915  norepinephrine 4 mg in dextrose 5% 250 mL infusion (premix) (titrating)     Question Answer Comment   Titrate by: (in mcg/kg/min) 0.01    Titrate interval: (in minutes) 10    Titrate to maintain: (MAP or SBP) MAP    Greater than: (in mmHg) 60    Maximum dose: (in mcg/kg/min) 0.05         -- IV Continuous 03/25/18 0800        Hyperglycemia/Diabetes Medications: Antihyperglycemics     Start     Stop Route Frequency Ordered    03/14/18 1630  insulin regular (Humulin R) 100 Units in sodium chloride 0.9% 100 mL infusion     Question:  Insulin Rate Adjustment (DO NOT MODIFY ANSWER)  Answer:  [email or web address] [id number]    -- IV Continuous 03/14/18 1530          ASSESSMENT and PLAN    Type 1 diabetes mellitus with hyperglycemia    BG goal 140-180  Continue intensive insulin gtt q2hr checks while remains intubated.     If BG continues to drop while on insulin rate 0.1u/hr, will need to add D5 IVF to be able to continue continuous insulin gtt in pt with T1DM. Do not suspend gtt >1 hr, pt is T1DM.    Low c peptide with glc 164. Treat as type 1.   Neg MODE 2013, neg islet cell ab on this admission.   Cannot order insulin ab while on insulin, and ZnT8 unavailable in labs.            On enteral nutrition     May adversely impact bg                      Hypothyroidism due to Hashimoto's thyroiditis     Abnormal TFTs upon admit.  Unclear if secondary to illness, but with evidence of iatrogenic hyperthyroidism in past while on above weight based dose.      Continue LT4 ng 125mcg daily (decreased from 150mcg)  Repeat TFTs in 4 weeks (due ~4/15)             Acute renal failure superimposed on stage 4 chronic kidney disease     Avoid insulin stacking             Heart transplanted     Per transplant  Avoid hypoglycemia             Current use of steroid medication     Chronic steroids PDN 2.5mg o/p.   Agree with stress dose steroids during critical illness              Palmira Dorsey MD  Endocrinology  [facility]

## 2018-03-25 NOTE — SUBJECTIVE & OBJECTIVE
"Interval HPI:   Overnight events:  Failure to wean vent, pulm on board.   BG at goal continues on TF  CRRT interrupted due to hypotension   More awake and alert    BP (!) 90/55 (BP Location: Right arm, Patient Position: Lying)   Pulse (!) 123   Temp 97.9 °F (36.6 °C) (Axillary)   Resp (!) 31   Ht 5' 7" (1.702 m)   Wt 64.7 kg (142 lb 10.2 oz)   SpO2 100%   BMI 22.34 kg/m²     Labs Reviewed and Include      Recent Labs  Lab 03/25/18  0400   *  178*  178*  178*   CALCIUM 8.3*  8.3*  8.3*  8.3*   ALBUMIN 2.8*  2.8*  2.8*  2.8*   PROT 6.6     140  140  140   K 4.5  4.5  4.5  4.5   CO2 19*  19*  19*  19*     108  108  108   BUN 6  6  6  6   CREATININE 0.5  0.5  0.5  0.5   ALKPHOS 214*   ALT 27   AST 37   BILITOT 1.6*     Lab Results   Component Value Date    WBC 3.18 (L) 03/25/2018    HGB 7.2 (L) 03/25/2018    HCT 21.8 (L) 03/25/2018    MCV 91 03/25/2018     (L) 03/25/2018     No results for input(s): TSH, FREET4 in the last 168 hours.  Lab Results   Component Value Date    HGBA1C 6.9 (H) 02/11/2018       Nutritional status:   Body mass index is 22.34 kg/m².  Lab Results   Component Value Date    ALBUMIN 2.8 (L) 03/25/2018    ALBUMIN 2.8 (L) 03/25/2018    ALBUMIN 2.8 (L) 03/25/2018    ALBUMIN 2.8 (L) 03/25/2018     Lab Results   Component Value Date    PREALBUMIN 5 (L) 03/15/2018    PREALBUMIN 18 (L) 03/24/2015    PREALBUMIN 19 (L) 12/17/2014       Estimated Creatinine Clearance: 172.5 mL/min (based on SCr of 0.5 mg/dL).    Accu-Checks  Recent Labs      03/24/18   1156  03/24/18   1402  03/24/18   1628  03/24/18   1759  03/24/18   2009  03/24/18   2214  03/25/18   0000  03/25/18   0158  03/25/18   0354  03/25/18   0600   POCTGLUCOSE  161*  189*  161*  201*  247*  202*  219*  199*  174*  158*       Current Medications and/or Treatments Impacting Glycemic Control  Immunotherapy:  Immunosuppressants         Stop Route Frequency     tacrolimus capsule 2 mg      -- " Oral 2 times daily        Steroids:   Hormones     None        Pressors:    Autonomic Drugs     Start     Stop Route Frequency Ordered    03/25/18 0915  norepinephrine 4 mg in dextrose 5% 250 mL infusion (premix) (titrating)     Question Answer Comment   Titrate by: (in mcg/kg/min) 0.01    Titrate interval: (in minutes) 10    Titrate to maintain: (MAP or SBP) MAP    Greater than: (in mmHg) 60    Maximum dose: (in mcg/kg/min) 0.05        -- IV Continuous 03/25/18 0800        Hyperglycemia/Diabetes Medications: Antihyperglycemics     Start     Stop Route Frequency Ordered    03/14/18 1630  insulin regular (Humulin R) 100 Units in sodium chloride 0.9% 100 mL infusion     Question:  Insulin Rate Adjustment (DO NOT MODIFY ANSWER)  Answer:  [email or web address] [email or web address]    -- IV Continuous 03/14/18 1530

## 2018-03-25 NOTE — PLAN OF CARE
Problem: Patient Care Overview  Goal: Plan of Care Review  Outcome: Ongoing (interventions implemented as appropriate)  No acute events throughout shift. Pt more awake throughout shift. BP around 90/50s when asleep, MAP >60. HR remains sinus tach. Pt on CRRT throughout the shift, . No UO this shift, No BM. Pt afebrile and responding to commands intermittently. Pt remains on insulin gtt, POCT glucose q2 hr. Tube feeds continued at 50cc/hr. POC reviewed with patient and spouse, all questions and concerns addressed. Will continue to monitor.

## 2018-03-26 PROBLEM — I95.9 HYPOTENSION: Status: ACTIVE | Noted: 2018-01-01

## 2018-03-26 NOTE — ASSESSMENT & PLAN NOTE
- Appreciate Hem Cx.   -The patient developed thrombocytopenia 5 days after receiving enoxaparin and has a 4T score of 5 indicating a ~ 14% chance of HIT.  The patients HIT panel was positive with OD of 0.431 indicating a 1-5% chance of having HIT. HILDA -ve   -The patients thrombocytopenia could be drug induced as well.   -The patient had a low fibrinogen, elevated INR, shistocytes on peripheral smear and elevated LDH which all point towards DIC. The haptoglobin is elevated which goes against hemolytic anemia,; however, haptoglobin is also an acute phase reactant.  As DIC is the higher on the differential would treat as such by treating underlying cause which is likely sepsis.       -Fibrinogen, INR and plt count every 12 hours.  -Give cryoprecipitate to get fibrinogen over 100.  Would give one unit at a time.  -Give FFP to get the INR below 1.8.  Would give at least two units at a time.  May consider giving the patient Vitamin K given recent use of coumadin.

## 2018-03-26 NOTE — PLAN OF CARE
Problem: Patient Care Overview  Goal: Plan of Care Review  Outcome: Ongoing (interventions implemented as appropriate)  Pt remains in sinus tach 110-120s, BP stable on Levo gtt. Pt not intermittently responding to questions and commands, max temp 99.5 axillary. CVP 6-7. Medium BM this shift, no UO, bladder scan read 200cc. Insulin gtt continued. Tube feeds remains at 50cc, tolerating well. 1 unit of of RBC given. ABX given. POC reviewed with patient and spouse, all questions and concerns addressed. Will continue to monitor.

## 2018-03-26 NOTE — HPI
42 year old male with PMH of 42 y.o. male presents with PMH of OHTX in November 2014, Hashimoto's thyroiditis, T1DM, CKD and history of recurrent acsites and pleural effusions now admitted for respiratory failure from severe RSV. He is known to thoracic surgery as he underwent a right VATS drainage of effusion and Pleurx placement on 1/11/18 by Dr. James. Pleurx was removed in clinic on 2/7/18 due to minimal output and potential clogging. In February he was admitted for diarrhea, worsening nonproductive cough and near syncopal episodes. Most recently admitted on 3/13/18 after he was found to be febrile and hypoxic in endoscopy lab.  Intubated on 3/14/18 for hypoxemic and hypercapnic resp failure. Chest CT at that time showed small right pleural effusion and bilateral subsegmental multifocal consolidation with air bronchograms more extensive in left lung. Since then he has had a complicated hospital stay for presumed severe RSV including acute kidney failure on CRRT, intermittent pressor requirement, DIC and several failed SBTs.    Currently on FiO2 of 40% and 5 PEEP. Tachycardic but not on pressors. Has been on Vanc, Zosyn and  Posoconazole. Switched to Linezolid and Meropenum today.   On Tacrolimus and Hydrocortisone for immunosuppression, Cellcept held.        PSH significant for OHTx in 2014 and cholecystectomy. Prior to right pleurx placement, he was getting monthly paracenteses and thoracentesis.

## 2018-03-26 NOTE — SUBJECTIVE & OBJECTIVE
"Interval HPI:   Overnight events: Remains intubated on TF. On intensive insulin gtt.  Eating:   NPO  Hypoglycemia and intervention: No  Fever: No  TPN and/or TF: Yes  If yes, type of TF/TPN and rate: Glucerna 1.5 @ 50/hr    BP 97/60   Pulse (!) 122   Temp 98.8 °F (37.1 °C) (Oral)   Resp (!) 30   Ht 5' 7" (1.702 m)   Wt 64.7 kg (142 lb 10.2 oz)   SpO2 100%   BMI 22.34 kg/m²     Labs Reviewed and Include      Recent Labs  Lab 03/26/18  0404   *  215*   CALCIUM 8.1*  8.1*   ALBUMIN 2.4*  2.4*   PROT 6.0     141   K 4.8  4.8   CO2 21*  21*     107   BUN 32*  32*   CREATININE 2.1*  2.1*   ALKPHOS 204*   ALT 24   AST 32   BILITOT 1.2*     Lab Results   Component Value Date    WBC 3.17 (L) 03/26/2018    HGB 8.5 (L) 03/26/2018    HCT 25.8 (L) 03/26/2018    MCV 92 03/26/2018     (L) 03/26/2018     No results for input(s): TSH, FREET4 in the last 168 hours.  Lab Results   Component Value Date    HGBA1C 6.9 (H) 02/11/2018       Nutritional status:   Body mass index is 22.34 kg/m².  Lab Results   Component Value Date    ALBUMIN 2.4 (L) 03/26/2018    ALBUMIN 2.4 (L) 03/26/2018    ALBUMIN 2.4 (L) 03/25/2018     Lab Results   Component Value Date    PREALBUMIN 5 (L) 03/15/2018    PREALBUMIN 18 (L) 03/24/2015    PREALBUMIN 19 (L) 12/17/2014       Estimated Creatinine Clearance: 41.1 mL/min (A) (based on SCr of 2.1 mg/dL (H)).    Accu-Checks  Recent Labs      03/25/18   1821  03/25/18 2001 03/25/18   2156  03/26/18   0007  03/26/18   0218  03/26/18   0407  03/26/18   0610  03/26/18   0734  03/26/18   1006  03/26/18   1221   POCTGLUCOSE  336*  258*  257*  271*  216*  227*  226*  231*  135*  161*       Current Medications and/or Treatments Impacting Glycemic Control  Immunotherapy:  Immunosuppressants         Stop Route Frequency     tacrolimus capsule 1 mg      -- Oral 2 times daily        Steroids:   Hormones     Start     Stop Route Frequency Ordered    03/26/18 0915  hydrocortisone " sodium succinate injection 50 mg      -- IV Every 8 hours 03/26/18 0909        Pressors:    Autonomic Drugs     Start     Stop Route Frequency Ordered    03/25/18 1430  norepinephrine 32 mg in sodium chloride 0.9% 250 mL infusion     Question Answer Comment   Titrate by: (in mcg/kg/min) 0.01    Titrate interval: (in minutes) 10    Titrate to maintain: (MAP or SBP) MAP    Greater than: (in mmHg) 60    Maximum dose: (in mcg/kg/min) 0.05        -- IV Continuous 03/25/18 1419        Hyperglycemia/Diabetes Medications: Antihyperglycemics     Start     Stop Route Frequency Ordered    03/14/18 1630  insulin regular (Humulin R) 100 Units in sodium chloride 0.9% 100 mL infusion     Question:  Insulin Rate Adjustment (DO NOT MODIFY ANSWER)  Answer:  [email or web address] [email or web address]    -- IV Continuous 03/14/18 1536

## 2018-03-26 NOTE — CONSULTS
Consult received. Full note to follow.    Please call for questions.    Thanks,  Lawrence Zavala, PGY-5  ID Fellow  Spectra [id number]  Pager: [phone]

## 2018-03-26 NOTE — PT/OT/SLP PROGRESS
Occupational Therapy   Treatment    Name: Lv Crocker  MRN: [id number]  Admitting Diagnosis:  Acute respiratory failure with hypoxia  13 Days Post-Op  Pt is s/p Heart transplant 11/14.     Recommendations:     Discharge Recommendations:  TBD    Subjective     Communicated with: nsg prior to session.  Pain/Comfort:  · Pain Rating 1: 0/10 (Pt without indication of pain. )    Patients cultural, spiritual, Denominational conflicts given the current situation: none reported     Objective:     Patient found supine in bed with fly present. Pt with vent via ETT.   Pt passed MOVE screen this AM.     Nsg and RT present during OT/PT session this date. :      General Precautions: Standard, fall, respiratory, NPO   Orthopedic Precautions:N/A     Occupational Performance:    Bed Mobility:    · Patient completed Supine to Sit with total assistance and 2 persons  · Patient completed Sit to Supine with total assistance and 2 persons       Activities of Daily Living:  · TOTAL A for all ADL's this date.     Patient left supine with all lines intact, call button in reach and nsg and fly present    [facility] 6 Click:  [facility] Total Score: 6    Treatment & Education:  Pt tolerated sitting EOB x 10 min with Zero sitting balance. Cues needed for upright posture and midline orientation. TOTAL A for place/hold of cervical spine in neutral.  Pt follows simple one step commands and became more alert as session continued. Pt demo visual tracking right<>left.  PROM completed B UE all joints/planes as tolerated. Spouse reports old brachial plexus injury right shoulder with torn RTC which causes increased pain with right shoulder ROM.  Moderated edema noted B UE's.   Education:    Assessment:     Lv Crocker is a 42 y.o. male with a medical diagnosis of Acute respiratory failure with hypoxia.  Performance deficits affecting function are weakness, impaired functional mobilty, impaired balance, gait instability, impaired self care skills,  impaired endurance, decreased upper extremity function, decreased lower extremity function.  Pt tolerated session fairly well. VSS throughout session and pt remained engaged throughout session, but demo profound weakness throughout UE/LE. Unclear of d/c recs at this time. Pt remains on vent via ETT with possible progression toward trach placement.     Rehab Prognosis:  Good ; patient would benefit from acute skilled OT services to address these deficits and reach maximum level of function.       Plan:     Patient to be seen 5 x/week to address the above listed problems via self-care/home management, therapeutic exercises, therapeutic activities, neuromuscular re-education  · Plan of Care Expires: 04/23/18  · Plan of Care Reviewed with: patient, family    This Plan of care has been discussed with the patient who was involved in its development and understands and is in agreement with the identified goals and treatment plan    GOALS:    Occupational Therapy Goals        Problem: Occupational Therapy Goal    Goal Priority Disciplines Outcome Interventions   Occupational Therapy Goal     OT, PT/OT Ongoing (interventions implemented as appropriate)    Description:  Goals to be met by: 4/7/2018    Pt will follow 75% of motor commands with <2 verbal cues for repetition of task to maximize engagement in grooming task.  Pt will complete feeding with mod A.   Pt will complete supine with HOB slightly elevated to seated at EOB with mod A in prep for seated grooming task.  Pt will engage in BUE exercises in orders to increase strength to 3+/5 in BUE's to increase engagement in seated grooming task  Pt will sit at EOB for approx 10 minutes with mod A and unilateral UE support to complete grooming task  Pt will complete sit>stand from EOB with bed in lowest position with mod A in prep for [place] [facility]                    Time Tracking:     OT Date of Treatment: 03/26/18  OT Start Time: 0930  OT Stop Time: 1000  OT Total Time  (min): 30 min    Billable Minutes:Therapeutic Exercise 15    AMADOU Villanueva  3/26/2018

## 2018-03-26 NOTE — SUBJECTIVE & OBJECTIVE
Interval History:   Failed SBT on 3/24; UF increased to help remove pulmonary edema. Developed hypotension on 3/25 requiring levophed to be restarted. SLED rinsed back and held overnight. Tmax 101.1. Currently on vanc and zosyn in addition to posaconazole. No events overnight. Bladder scan early this morning with ~200cc urine. Net +2.5L yesterday. Minimal vent requirements. Off levophed, hourly intake 55cc/hr with tube feeds and insulin. Hydrocortisone to be restarted today. Wife and mother-in-law at bedside.    Review of patient's allergies indicates:   Allergen Reactions    No known drug allergies      Current Facility-Administered Medications   Medication Frequency    0.9%  NaCl infusion (CRRT USE ONLY) Continuous    acetaminophen oral solution 650 mg Q4H PRN    chlorhexidine 0.12 % solution 15 mL BID    dexmedetomidine (PRECEDEX) 400mcg/100mL 0.9% NaCL infusion Continuous    dextrose 50% injection 12.5 g PRN    dextrose 50% injection 25 g PRN    docusate 50 mg/5 mL liquid 100 mg BID    escitalopram oxalate tablet 20 mg Daily    famotidine (PF) injection 20 mg BID    glucagon (human recombinant) injection 1 mg PRN    glucose chewable tablet 16 g PRN    glucose chewable tablet 24 g PRN    heparin (porcine) injection 5,000 Units Q12H    hydrocortisone sodium succinate injection 50 mg Q8H    insulin regular (Humulin R) 100 Units in sodium chloride 0.9% 100 mL infusion Continuous    k phos di & mono-sod phos mono 250 mg tablet 2 tablet Q4H PRN    levothyroxine tablet 125 mcg Before breakfast    metoclopramide HCl injection 5 mg Q6H    norepinephrine 32 mg in sodium chloride 0.9% 250 mL infusion Continuous    piperacillin-tazobactam 4.5 g in sodium chloride 0.9% 100 mL IVPB (ready to mix system) Q12H    polyethylene glycol packet 17 g Daily    posaconazole 200 mg/5ml (40 mg/ml) suspension 200 mg TID WM    sodium chloride 0.9% flush 3 mL Q8H    [START ON 3/27/2018] tacrolimus capsule 1 mg BID        Objective:     Vital Signs (Most Recent):  Temp: 99 °F (37.2 °C) (03/26/18 0700)  Pulse: (!) 126 (03/26/18 0930)  Resp: 15 (03/26/18 0930)  BP: 101/64 (03/26/18 0800)  SpO2: 100 % (03/26/18 0930)  O2 Device (Oxygen Therapy): ventilator (03/26/18 0900) Vital Signs (24h Range):  Temp:  [99 °F (37.2 °C)-101.1 °F (38.4 °C)] 99 °F (37.2 °C)  Pulse:  [122-133] 126  Resp:  [9-33] 15  SpO2:  [100 %] 100 %  BP: ()/(52-70) 101/64  Arterial Line BP: ()/(45-71) 96/46     Weight: 64.7 kg (142 lb 10.2 oz) (03/25/18 0155)  Body mass index is 22.34 kg/m².  Body surface area is 1.75 meters squared.    I/O last 3 completed shifts:  In: 5510.8 [I.V.:2539.8; Blood:401; NG/GT:1870; IV Piggyback:700]  Out: 4148 [Other:4148]    Physical Exam   Constitutional: He appears well-developed and well-nourished.   HENT:   Head: Normocephalic and atraumatic.   Neck: Neck supple. No tracheal deviation present.   Cardiovascular: Regular rhythm.  Tachycardia present.    Pulmonary/Chest: He has no wheezes. He has no rales.   Abdominal: Soft. He exhibits no distension.   Musculoskeletal: He exhibits no edema (no LE edema) or deformity.   Skin: Skin is warm and dry. He is not diaphoretic.       Significant Labs:  ABGs:   Recent Labs  Lab 03/26/18  0742   PH 7.460*   PCO2 24.2*   HCO3 17.2*   POCSATURATED 99   BE -7     CBC:   Recent Labs  Lab 03/26/18  0739   WBC 3.17*   RBC 2.82*   HGB 8.5*   HCT 25.8*   *   MCV 92   MCH 30.1   MCHC 32.9     CMP:   Recent Labs  Lab 03/26/18  0404   *  215*   CALCIUM 8.1*  8.1*   ALBUMIN 2.4*  2.4*   PROT 6.0     141   K 4.8  4.8   CO2 21*  21*     107   BUN 32*  32*   CREATININE 2.1*  2.1*   ALKPHOS 204*   ALT 24   AST 32   BILITOT 1.2*     All labs within the past 24 hours have been reviewed.     Significant Imaging:  Labs: Reviewed  X-Ray: Reviewed; unchanged

## 2018-03-26 NOTE — SUBJECTIVE & OBJECTIVE
"Interval History: Arouses to voice and nods head appropriately, nods "no" when I ask him if he can move his extremities. T max past 24 hours 101.1. Continue to require Levo    Continuous Infusions:   sodium chloride 0.9% 200 mL/hr at 03/24/18 2045    dexmedetomidine (PRECEDEX) infusion Stopped (03/21/18 0750)    insulin (HUMAN R) infusion (adults) 5.54 Units/hr (03/26/18 0800)    norepinephrine bitartrate-D5W Stopped (03/26/18 0845)     Scheduled Meds:   chlorhexidine  15 mL Mouth/Throat BID    docusate  100 mg Per NG tube BID    escitalopram oxalate  20 mg Oral Daily    famotidine (PF)  20 mg Intravenous BID    heparin (porcine)  5,000 Units Subcutaneous Q12H    hydrocortisone sodium succinate  50 mg Intravenous Q8H    levothyroxine  125 mcg Per NG tube Before breakfast    metoclopramide HCl  5 mg Intravenous Q6H    piperacillin-tazobactam (ZOSYN) IVPB  4.5 g Intravenous Q12H    polyethylene glycol  17 g Per NG tube Daily    posaconazole 200 mg/5ml (40 mg/ml)  200 mg Per NG tube TID WM    sodium chloride 0.9%  3 mL Intravenous Q8H    tacrolimus  2 mg Oral Daily     PRN Meds:acetaminophen, dextrose 50%, dextrose 50%, glucagon (human recombinant), glucose, glucose, k phos di & mono-sod phos mono    Review of patient's allergies indicates:   Allergen Reactions    No known drug allergies      Objective:     Vital Signs (Most Recent):  Temp: 99 °F (37.2 °C) (03/26/18 0700)  Pulse: (!) 127 (03/26/18 0849)  Resp: (!) 9 (03/26/18 0849)  BP: 101/64 (03/26/18 0800)  SpO2: 100 % (03/26/18 0849) Vital Signs (24h Range):  Temp:  [99 °F (37.2 °C)-101.1 °F (38.4 °C)] 99 °F (37.2 °C)  Pulse:  [122-133] 127  Resp:  [9-33] 9  SpO2:  [100 %] 100 %  BP: ()/(52-70) 101/64  Arterial Line BP: (101-139)/(45-71) 103/49     Patient Vitals for the past 72 hrs (Last 3 readings):   Weight   03/25/18 0155 64.7 kg (142 lb 10.2 oz)     Body mass index is 22.34 kg/m².      Intake/Output Summary (Last 24 hours) at 03/26/18 " 0920  Last data filed at 03/26/18 0845   Gross per 24 hour   Intake          2778.11 ml   Output                0 ml   Net          2778.11 ml       Hemodynamic Parameters:       Telemetry: ST    Physical Exam   Constitutional: He appears well-developed and well-nourished.   Appears ill   HENT:   Head: Normocephalic and atraumatic.   Eyes: Conjunctivae are normal. Pupils are equal, round, and reactive to light.   Neck: No thyromegaly present.   RIJ trialysis   Cardiovascular: Regular rhythm.    Tachycardic   Pulmonary/Chest:   Intubated and ventilated  Breath sounds are slightly decreased right base. Essentially CTA bilaterally   Abdominal: Soft.   Hypoactive bowel sounds. + ascites   Musculoskeletal:   Trace gen edema   Neurological:   Intubated. Arouses to voice and nods head appropriately   Skin: Skin is warm and dry. Capillary refill takes less than 2 seconds. There is pallor.       Significant Labs:  CBC:    Recent Labs  Lab 03/25/18  1545 03/26/18  0007 03/26/18  0739   WBC 4.02 2.98* 3.17*   RBC 2.64* 2.24* 2.82*   HGB 7.8* 6.9* 8.5*   HCT 24.2* 20.8* 25.8*    133* 136*   MCV 92 93 92   MCH 29.5 30.8 30.1   MCHC 32.2 33.2 32.9     BNP:  No results for input(s): BNP in the last 168 hours.    Invalid input(s): BNPTRIAGELBLO  CMP:    Recent Labs  Lab 03/24/18  0400  03/25/18  0400 03/25/18  1357 03/25/18  2153 03/26/18  0404   *  158*  158*  < > 178*  178*  178*  178* 378* 268* 215*  215*   CALCIUM 8.4*  8.4*  8.4*  < > 8.3*  8.3*  8.3*  8.3* 8.1* 8.0* 8.1*  8.1*   ALBUMIN 3.0*  3.0*  3.0*  < > 2.8*  2.8*  2.8*  2.8* 2.5* 2.4* 2.4*  2.4*   PROT 6.6  --  6.6  --   --  6.0     138  138  < > 140  140  140  140 138 139 141  141   K 4.5  4.5  4.5  < > 4.5  4.5  4.5  4.5 4.7 4.8 4.8  4.8   CO2 21*  21*  21*  < > 19*  19*  19*  19* 21* 21* 21*  21*     103  103  < > 108  108  108  108 106 105 107  107   BUN 8  8  8  < > 6  6  6  6 17 26* 32*  32*    CREATININE 0.7  0.7  0.7  < > 0.5  0.5  0.5  0.5 1.1 1.7* 2.1*  2.1*   ALKPHOS 206*  --  214*  --   --  204*   ALT 33  --  27  --   --  24   AST 43*  --  37  --   --  32   BILITOT 2.2*  --  1.6*  --   --  1.2*   < > = values in this interval not displayed.   Coagulation:     Recent Labs  Lab 03/23/18  1550  03/25/18  0739 03/25/18  1545 03/26/18  0007   INR 1.4*  < > 1.1 1.0 1.1   APTT 25.5  --   --   --   --    < > = values in this interval not displayed.  LDH:    Recent Labs  Lab 03/23/18  1550   *     Microbiology:  Microbiology Results (last 7 days)     Procedure Component Value Units Date/Time    Blood culture [[id number]] Collected:  03/25/18 1554    Order Status:  Completed Specimen:  Blood from Peripheral, Upper Arm, Right Updated:  03/26/18 0115     Blood Culture, Routine No Growth to date    Blood culture [[id number]] Collected:  03/25/18 1606    Order Status:  Completed Specimen:  Blood from Peripheral, Forearm, Left Updated:  03/26/18 0115     Blood Culture, Routine No Growth to date    Culture, Respiratory with Gram Stain [[id number]] Collected:  03/25/18 1534    Order Status:  Completed Specimen:  Respiratory from Sputum, Induced Updated:  03/25/18 2115     Gram Stain (Respiratory) <10 epithelial cells per low power field.     Gram Stain (Respiratory) Rare WBC's     Gram Stain (Respiratory) Rare Gram positive cocci    Urine culture [[id number]]     Order Status:  Canceled Specimen:  Urine     Blood culture [[id number]] Collected:  03/21/18 0815    Order Status:  Completed Specimen:  Blood from Peripheral, Forearm, Left Updated:  03/25/18 1012     Blood Culture, Routine No Growth to date     Blood Culture, Routine No Growth to date     Blood Culture, Routine No Growth to date     Blood Culture, Routine No Growth to date     Blood Culture, Routine No Growth to date    Blood culture [[id number]] Collected:  03/18/18 0748    Order Status:  Completed Specimen:  Blood from Peripheral, Hand, Right  Updated:  03/23/18 1012     Blood Culture, Routine No growth after 5 days.    Blood culture [[id number]] Collected:  03/18/18 0618    Order Status:  Completed Specimen:  Blood from Peripheral, Hand, Left Updated:  03/21/18 1108     Blood Culture, Routine Gram stain aer bottle: Gram positive cocci in clusters resembling Staph      Blood Culture, Routine Results called to and read back by:Omid Cody RN 03/19/2018  10:55     Blood Culture, Routine --     COAGULASE-NEGATIVE STAPHYLOCOCCUS SPECIES  Organism is a probable contaminant            I have reviewed all pertinent labs within the past 24 hours.    Estimated Creatinine Clearance: 41.1 mL/min (A) (based on SCr of 2.1 mg/dL (H)).    Diagnostic Results:  I have reviewed all pertinent imaging results/findings within the past 24 hours.

## 2018-03-26 NOTE — PROGRESS NOTES
[facility]  Infectious Disease  Progress Note    Patient Name: Lv Crocker  MRN: [id number]  Admission Date: 3/11/2018  Length of Stay: 14 days  Attending Physician: Jose A Lloyd MD  Primary Care Provider: Philippe Mohr MD    Isolation Status: No active isolations  Assessment/Plan:      Hypotension    41 y/o man with a ICM, s/p heart transplant 11/18/14; previously seen my ID on this admission for RSV-A pneumonia/multifocal pneumonia; ID called back today as patient had fever and was hypotensive overnight requiring vasopressors. Previous infectious work up done during admission reviewed.    - B/C 3/25/18 NGTD  - respiratory culture 3/25/18 NGTD  - CXR 3/26/18: continued right pleural effusion.  Diffuse increase in the pulmonary vascular interstitial and alveolar markings.  Focal opacity developing at the left base.  - would perform non contrast chest (if possible) to better evaluate CXR findings   - will switch antibiotic therapy to meropenem and linezolid; continue posaconazole for now            Anticipated Disposition: pending    Thank you for your consult. I will follow-up with patient. Please contact us if you have any additional questions.    Lawrence Dillard MD  Infectious Disease Fellow, PGY-5  Spectra: [phone]  Pager: [phone]  [facility]    Subjective:     Principal Problem:Acute respiratory failure with hypoxia    HPI: 41yo man w/a history of DM1, Hashimoto's thyroiditis, and ICM (s/p OHT 11/18/2014, CMV D+/R+, steroid induction, on maintenance tacro/MMF/pred; c/b early hemorrhagic tamponade requiring washout, delayed closure, and pericardial window and subsequent AF w/RVR, and CACHORRO; late course c/b ABMR 4/2015 s/p rituxan, PLEX, and IVIG; subsequent C.diff/CMV reactivation, restrictive cardiomyopathy with recurrent pleural effusions s/p VATS/pleurex catheter 1/2018 with removal 2/2018 and ascites requiring repeated LVP, and CKD) who was admitted on  3/11/2018 with ~3wks of worsening profuse non-bloody diarrhea, associated cramping abdominal pain, N/V, and a week of acute on chronic cough, SOB, hypoxia requiring intubation, and fevers and was found to have multifocal pneumonia on CT chest due to RSV-A pneumonia with suspected superimposed bacterial pneumonia. He remains critically ill with ongoing pressor requirement, hypoxic RF, CRRT requirement, and minimal hepatitis.  Interval History: ID called back today as patient spiked fever of 101.5F; required overnight vasopressors (currenlty off them), unable to be extubated on passed days thought to be secondary to critical care illness; ventilatory settings FiO2 40%/PEEP5 (unchanged from previous days)    Previous work up done:  Aspergillus blood antigen and BAL 3/14/18 negative  Blastomyces urine Ag 3/13/18 negative  CMV BAL 3/14/18 negative  CMV blood 3/21/18 negative  Fungitell 3/13/18 negative  GI stool pathogen 3/13/18 negative  Crypto/Giardia 3/11/18 negative  Legionella 3/13/18  Negative  Rotavirus 3/11/18 negative  Strongyloides IgG 3/14/18 negative  Stool O+P 3/11/18 negative    Review of Systems   Unable to perform ROS: Intubated     Objective:     Vital Signs (Most Recent):  Temp: 98.8 °F (37.1 °C) (03/26/18 1100)  Pulse: (!) 120 (03/26/18 1548)  Resp: (!) 27 (03/26/18 1548)  BP: (!) 101/59 (03/26/18 1400)  SpO2: 100 % (03/26/18 1548) Vital Signs (24h Range):  Temp:  [98.8 °F (37.1 °C)-101.1 °F (38.4 °C)] 98.8 °F (37.1 °C)  Pulse:  [119-133] 120  Resp:  [0-35] 27  SpO2:  [100 %] 100 %  BP: ()/(52-70) 101/59  Arterial Line BP: ()/(43-71) 100/55     Weight: 64.7 kg (142 lb 10.2 oz)  Body mass index is 22.34 kg/m².    Estimated Creatinine Clearance: 41.1 mL/min (A) (based on SCr of 2.1 mg/dL (H)).    Physical Exam   Constitutional: He is intubated.   Eyes: No scleral icterus.   Cardiovascular: Normal rate and regular rhythm.    Pulmonary/Chest: He is intubated. He has no wheezes. He has no rales.    Abdominal: Soft. He exhibits no distension and no mass. There is no rebound.   Musculoskeletal: He exhibits no edema.   Right foot 1st toe slightly purpuric; 3rd toe tip necrotic   Lymphadenopathy:     He has no cervical adenopathy.   Skin: No rash noted. No erythema.       Significant Labs:   Bilirubin:   Recent Labs  Lab 03/13/18  2334  03/22/18  0358 03/23/18  0400 03/24/18  0400 03/25/18  0400 03/26/18  0404   BILIDIR 0.9*  --   --   --   --   --   --    BILITOT 1.1*  < > 2.4* 2.3* 2.2* 1.6* 1.2*   < > = values in this interval not displayed.  Blood Culture:     Recent Labs  Lab 03/18/18  0618 03/18/18  0748 03/21/18  0815 03/25/18  1554 03/25/18  1606   LABBLOO Gram stain aer bottle: Gram positive cocci in clusters resembling Staph   Results called to and read back by:Omid Cody RN 03/19/2018  10:55  COAGULASE-NEGATIVE STAPHYLOCOCCUS SPECIESOrganism is a probable contaminant No growth after 5 days. No growth after 5 days. No Growth to date No Growth to date     BMP:     Recent Labs  Lab 03/26/18  0007 03/26/18  0404   GLU  --  215*  215*   NA  --  141  141   K  --  4.8  4.8   CL  --  107  107   CO2  --  21*  21*   BUN  --  32*  32*   CREATININE  --  2.1*  2.1*   CALCIUM  --  8.1*  8.1*   MG 1.9  --      CBC:     Recent Labs  Lab 03/25/18  1545 03/26/18  0007 03/26/18  0739   WBC 4.02 2.98* 3.17*   HGB 7.8* 6.9* 8.5*   HCT 24.2* 20.8* 25.8*    133* 136*     CMP:     Recent Labs  Lab 03/25/18  0400 03/25/18  1357 03/25/18  2153 03/26/18  0404     140  140  140 138 139 141  141   K 4.5  4.5  4.5  4.5 4.7 4.8 4.8  4.8     108  108  108 106 105 107  107   CO2 19*  19*  19*  19* 21* 21* 21*  21*   *  178*  178*  178* 378* 268* 215*  215*   BUN 6  6  6  6 17 26* 32*  32*   CREATININE 0.5  0.5  0.5  0.5 1.1 1.7* 2.1*  2.1*   CALCIUM 8.3*  8.3*  8.3*  8.3* 8.1* 8.0* 8.1*  8.1*   PROT 6.6  --   --  6.0   ALBUMIN 2.8*  2.8*  2.8*  2.8* 2.5* 2.4*  2.4*  2.4*   BILITOT 1.6*  --   --  1.2*   ALKPHOS 214*  --   --  204*   AST 37  --   --  32   ALT 27  --   --  24   ANIONGAP 13  13  13  13 11 13 13  13   EGFRNONAA >60.0  >60.0  >60.0  >60.0 >60.0 48.0* 37.2*  37.2*     Microbiology Results (last 7 days)     Procedure Component Value Units Date/Time    Culture, Respiratory with Gram Stain [[id number]] Collected:  03/25/18 1534    Order Status:  Completed Specimen:  Respiratory from Sputum, Induced Updated:  03/26/18 1153     Respiratory Culture Normal respiratory hank     Gram Stain (Respiratory) <10 epithelial cells per low power field.     Gram Stain (Respiratory) Rare WBC's     Gram Stain (Respiratory) Rare Gram positive cocci    Blood culture [[id number]] Collected:  03/21/18 0815    Order Status:  Completed Specimen:  Blood from Peripheral, Forearm, Left Updated:  03/26/18 1012     Blood Culture, Routine No growth after 5 days.    Blood culture [[id number]] Collected:  03/25/18 1554    Order Status:  Completed Specimen:  Blood from Peripheral, Upper Arm, Right Updated:  03/26/18 0115     Blood Culture, Routine No Growth to date    Blood culture [[id number]] Collected:  03/25/18 1606    Order Status:  Completed Specimen:  Blood from Peripheral, Forearm, Left Updated:  03/26/18 0115     Blood Culture, Routine No Growth to date    Urine culture [[id number]]     Order Status:  Canceled Specimen:  Urine     Blood culture [[id number]] Collected:  03/18/18 0748    Order Status:  Completed Specimen:  Blood from Peripheral, Hand, Right Updated:  03/23/18 1012     Blood Culture, Routine No growth after 5 days.    Blood culture [[id number]] Collected:  03/18/18 0618    Order Status:  Completed Specimen:  Blood from Peripheral, Hand, Left Updated:  03/21/18 1108     Blood Culture, Routine Gram stain aer bottle: Gram positive cocci in clusters resembling Staph      Blood Culture, Routine Results called to and read back by:Omid Cody RN 03/19/2018  10:55     Blood  Culture, Routine --     COAGULASE-NEGATIVE STAPHYLOCOCCUS SPECIES  Organism is a probable contaminant            Significant Imaging: I have reviewed all pertinent imaging results/findings within the past 24 hours.

## 2018-03-26 NOTE — PROGRESS NOTES
[facility]  Nephrology  Progress Note    Patient Name: Lv Crocker  MRN: [id number]  Admission Date: 3/11/2018  Hospital Length of Stay: 14 days  Attending Provider: Jose A Lloyd MD   Primary Care Physician: Philippe Mohr MD  Principal Problem:Acute respiratory failure with hypoxia    Subjective:     HPI: Mr. Crocker is a 41 yo WM with T1DM, Hashimoto thyroiditis, h/o OHTx 11/2014, and CKD stage 4 who was admitted on 3/11/18 for persistent diarrhea. He reported a 3 week history of diarrhea up to 15x/day. Associated symptoms including URI symptoms, coughing fits, and coughing syncope. Prograf level was 14.3. His post-heart transplant course has been complicated by AMR 4/2015, CMV, post-transplant restrictive cardiomyopathy, recurrent pleural effusions/ascites requiring monthly thoracenteses/paracenteses, VATS 1/11/18 with Pleur-X catheter (now removed), and CKD stage 4 with baseline sCr 2.6-3.0. Baseline -120s and baseline BP 90s-110s/60-70s. Upon admission he was started on IVF and diarrhea workup was initiated. On 3/12 he was noted to have decreased UOP despite fluids; NS was increased to 100cc/hr. He was scheduled for a colonoscopy on 3/13 however procedure was cancelled due to hypoxia and fever. He was transferred to the ICU for closer monitoring. He continued to have oliguria overnight. Bladder scan revealed 200cc of urine but patient refused osullivan catheter placement. Wife reports that patient always has a hard time urinating again after osullivan catheters are placed/removed so he refuses them. He remained hypoxic despite FiO2 70% and ABG revealed combined respiratory and metabolic acidosis with pH 7.17. He was started on BiPAP as well as a bicarb infusion at 50cc/hr. ABG with mild improvement. AM labs revealed K 6.2 and he was shifted and given kayexalate.Trialysis catheter was placed this morning and he subsequently developed hypotension and was started on levophed. He was  intubated later this morning. Nephrology consulted for CACHORRO. All history obtained by primary team and chart review as patient was intubated/sedated on exam. Consent for dialysis obtained by patient's wife and placed in chart. Of note, both of patient's parents were on dialysis.     Interval History:   Failed SBT on 3/24; UF increased to help remove pulmonary edema. Developed hypotension on 3/25 requiring levophed to be restarted. SLED rinsed back and held overnight. Tmax 101.1. Currently on vanc and zosyn in addition to posaconazole. No events overnight. Bladder scan early this morning with ~200cc urine. Net +2.5L yesterday. Minimal vent requirements. Off levophed, hourly intake 55cc/hr with tube feeds and insulin. Hydrocortisone to be restarted today. Wife and mother-in-law at bedside.    Review of patient's allergies indicates:   Allergen Reactions    No known drug allergies      Current Facility-Administered Medications   Medication Frequency    0.9%  NaCl infusion (CRRT USE ONLY) Continuous    acetaminophen oral solution 650 mg Q4H PRN    chlorhexidine 0.12 % solution 15 mL BID    dexmedetomidine (PRECEDEX) 400mcg/100mL 0.9% NaCL infusion Continuous    dextrose 50% injection 12.5 g PRN    dextrose 50% injection 25 g PRN    docusate 50 mg/5 mL liquid 100 mg BID    escitalopram oxalate tablet 20 mg Daily    famotidine (PF) injection 20 mg BID    glucagon (human recombinant) injection 1 mg PRN    glucose chewable tablet 16 g PRN    glucose chewable tablet 24 g PRN    heparin (porcine) injection 5,000 Units Q12H    hydrocortisone sodium succinate injection 50 mg Q8H    insulin regular (Humulin R) 100 Units in sodium chloride 0.9% 100 mL infusion Continuous    k phos di & mono-sod phos mono 250 mg tablet 2 tablet Q4H PRN    levothyroxine tablet 125 mcg Before breakfast    metoclopramide HCl injection 5 mg Q6H    norepinephrine 32 mg in sodium chloride 0.9% 250 mL infusion Continuous     piperacillin-tazobactam 4.5 g in sodium chloride 0.9% 100 mL IVPB (ready to mix system) Q12H    polyethylene glycol packet 17 g Daily    posaconazole 200 mg/5ml (40 mg/ml) suspension 200 mg TID WM    sodium chloride 0.9% flush 3 mL Q8H    [START ON 3/27/2018] tacrolimus capsule 1 mg BID       Objective:     Vital Signs (Most Recent):  Temp: 99 °F (37.2 °C) (03/26/18 0700)  Pulse: (!) 126 (03/26/18 0930)  Resp: 15 (03/26/18 0930)  BP: 101/64 (03/26/18 0800)  SpO2: 100 % (03/26/18 0930)  O2 Device (Oxygen Therapy): ventilator (03/26/18 0900) Vital Signs (24h Range):  Temp:  [99 °F (37.2 °C)-101.1 °F (38.4 °C)] 99 °F (37.2 °C)  Pulse:  [122-133] 126  Resp:  [9-33] 15  SpO2:  [100 %] 100 %  BP: ()/(52-70) 101/64  Arterial Line BP: ()/(45-71) 96/46     Weight: 64.7 kg (142 lb 10.2 oz) (03/25/18 0155)  Body mass index is 22.34 kg/m².  Body surface area is 1.75 meters squared.    I/O last 3 completed shifts:  In: 5510.8 [I.V.:2539.8; Blood:401; NG/GT:1870; IV Piggyback:700]  Out: 4148 [Other:4148]    Physical Exam   Constitutional: He appears well-developed and well-nourished.   HENT:   Head: Normocephalic and atraumatic.   Neck: Neck supple. No tracheal deviation present.   Cardiovascular: Regular rhythm.  Tachycardia present.    Pulmonary/Chest: He has no wheezes. He has no rales.   Abdominal: Soft. He exhibits no distension.   Musculoskeletal: He exhibits no edema (no LE edema) or deformity.   Skin: Skin is warm and dry. He is not diaphoretic.       Significant Labs:  ABGs:   Recent Labs  Lab 03/26/18  0742   PH 7.460*   PCO2 24.2*   HCO3 17.2*   POCSATURATED 99   BE -7     CBC:   Recent Labs  Lab 03/26/18  0739   WBC 3.17*   RBC 2.82*   HGB 8.5*   HCT 25.8*   *   MCV 92   MCH 30.1   MCHC 32.9     CMP:   Recent Labs  Lab 03/26/18  0404   *  215*   CALCIUM 8.1*  8.1*   ALBUMIN 2.4*  2.4*   PROT 6.0     141   K 4.8  4.8   CO2 21*  21*     107   BUN 32*  32*   CREATININE  2.1*  2.1*   ALKPHOS 204*   ALT 24   AST 32   BILITOT 1.2*     All labs within the past 24 hours have been reviewed.     Significant Imaging:  Labs: Reviewed  X-Ray: Reviewed; unchanged    Assessment/Plan:     Acute renal failure superimposed on stage 4 chronic kidney disease    - baseline sCr 2.6-3.0 consistent with CKD stage IV  - anuric CACHORRO; likely ischemic ATN from prolonged pre-renal state (diarrhea) in setting of prograf renal vasoconstriction; cannot r/o septic ATN or Prograf toxicity  - SLED started 3/14  - remains anuric  - RRT held yesterday due to hypotension. Net +2.2L yesterday. Remains dependent on ventilator  - will restart SLED today with gentle UF: 250-300cc/hr as tolerated  - will continue to monitor fever curve; if fevers persist, may need to exchange trialysis catheter            Amairani Alegria, PGY-5  Nephrology Fellow  [facility]  Pager: [phone]

## 2018-03-26 NOTE — SUBJECTIVE & OBJECTIVE
"No current facility-administered medications on file prior to encounter.        Review of patient's allergies indicates:   Allergen Reactions    No known drug allergies        Past Medical History:   Diagnosis Date    Arthritis     Ascites     Thought to be 2/2 heart tpx; liver bx 3/31/17 w/o significant fibrosis    Cardiomyopathy     Depression     Controlled "for the most part" on Lexipro    Encounter for blood transfusion     during transplant    GERD (gastroesophageal reflux disease)     Hashimoto's disease     History of CHF (congestive heart failure)     Previously EF 20% (prior to heart transplant); last EF 60%, PAP 41 as of 12/12/17; throught to be 2/2 genetics & DM1    History of coronary artery disease     H/o Coronary artery disease; resolved since heart transplant 2014    History of myocardial infarction     h/o MI x3 prior to heart transplant    HLD (hyperlipidemia) 6/11/2015    Hypoglycemia unawareness in type 1 diabetes mellitus     Hypothyroid     Initially hyperthyroid 2/2 Hoshimoto's thyroiditis; now on levothyroxine 150 mcg qd    Pleural effusion due to another disorder     Thought to be 2/2 heart tpx; s/p PleuRx catheter placement and removal after 1-2 months    Pulmonary hypertension assoc with unclear multi-factorial mechanisms 12/12/2017    PAP 41 (EF 60%) on ECHO 12/12/17    Syncope 6/30/2015    Type I diabetes mellitus, well controlled     Well controlled; Dx'd @7y/o; on N insulin 20U BID & Humalog 10U w/meals +SSI; Glu 89 11/9/17; A1c 7.2% 4/5/17    Unspecified essential hypertension 05/29/2014    Well controlled; Last /57 on 11/9/17     Past Surgical History:   Procedure Laterality Date    CARDIAC CATHETERIZATION      CARDIAC SURGERY      CHOLECYSTECTOMY      COLONOSCOPY N/A 3/13/2018    Procedure: COLONOSCOPY;  Surgeon: Tim Spencer MD;  Location: [facility] ([address] [place] [address]);  Service: Endoscopy;  Laterality: N/A;    CORONARY ANGIOPLASTY      FOOT SPLIT " TRANSFER OF THE POSTERIOR TIBIALIS TENDON PROCEDURE      HEART TRANSPLANT  2014    KNEE SURGERY      PleuRx catheter placement  01/11/2018    Plan for removal after 1-2 months by Dr. James in cardiothoracic surgery    s/p oht  November 2014    stent placemnet       Family History     Problem Relation (Age of Onset)    Cancer Brother (50)    Diabetes Mother, Father, Brother, Son, Sister, Maternal Uncle, Paternal Aunt, Maternal Grandmother, Maternal Grandfather    Heart disease Mother, Brother    Hypertension Mother, Father, Brother    Kidney disease Mother, Father    Stroke Father, Brother    Vision loss Brother        Social History Main Topics    Smoking status: Never Smoker    Smokeless tobacco: Never Used    Alcohol use No    Drug use: No    Sexual activity: Yes     Partners: Female     Review of Systems   Unable to perform ROS: Intubated     Objective:     Vital Signs (Most Recent):  Temp: 98.8 °F (37.1 °C) (03/26/18 1100)  Pulse: (!) 120 (03/26/18 1548)  Resp: (!) 27 (03/26/18 1548)  BP: (!) 101/59 (03/26/18 1400)  SpO2: 100 % (03/26/18 1548) Vital Signs (24h Range):  Temp:  [98.8 °F (37.1 °C)-101.1 °F (38.4 °C)] 98.8 °F (37.1 °C)  Pulse:  [119-130] 120  Resp:  [0-35] 27  SpO2:  [100 %] 100 %  BP: ()/(52-70) 101/59  Arterial Line BP: ()/(43-71) 100/55     Weight: 64.7 kg (142 lb 10.2 oz)  Body mass index is 22.34 kg/m².    Intake/Output - Last 3 Shifts       03/24 0700 - 03/25 0659 03/25 0700 - 03/26 0659 03/26 0700 - 03/27 0659    I.V. (mL/kg) 4634.7 (71.6) 522.8 (8.1) 132.1 (2)    Blood  401     NG/GT 1230 1320 540    IV Piggyback 250 700     Total Intake(mL/kg) 6114.7 (94.5) 2943.8 (45.5) 672.1 (10.4)    Other 8068 353 495    Stool 0      Total Output 8068 353 495    Net -1953.4 +2590.8 +177.1           Stool Occurrence 1 x            SpO2: 100 %  O2 Device (Oxygen Therapy): ventilator    Physical Exam   Constitutional: He is intubated.   HENT:   Head: Normocephalic and atraumatic.    Neck: Normal range of motion. No tracheal deviation present. No thyromegaly present.   Cardiovascular: Regular rhythm and intact distal pulses.  Tachycardia present.    No murmur heard.  Pulmonary/Chest: He is intubated. He has decreased breath sounds in the right lower field and the left lower field.   Abdominal: Soft. Bowel sounds are normal. He exhibits no distension. There is no tenderness.   Musculoskeletal: He exhibits no edema.   Lymphadenopathy:     He has no cervical adenopathy.   Neurological: He is alert.       Significant Labs:  ABGs:   Recent Labs  Lab 03/26/18  0742   PH 7.460*   PCO2 24.2*   PO2 111*   HCO3 17.2*   POCSATURATED 99   BE -7     Cardiac markers: No results for input(s): CKMB, CPKMB, TROPONINT, TROPONINI, MYOGLOBIN in the last 48 hours.  CBC:   Recent Labs  Lab 03/26/18  1617   WBC 2.21*   RBC 2.66*   HGB 7.9*   HCT 24.4*   *   MCV 92   MCH 29.7   MCHC 32.4     CMP:   Recent Labs  Lab 03/26/18  0404 03/26/18  1617   *  215* 172*   CALCIUM 8.1*  8.1* 8.1*   ALBUMIN 2.4*  2.4* 2.2*   PROT 6.0  --      141 142   K 4.8  4.8 5.2*   CO2 21*  21* 21*     107 108   BUN 32*  32* 28*   CREATININE 2.1*  2.1* 1.9*   ALKPHOS 204*  --    ALT 24  --    AST 32  --    BILITOT 1.2*  --      Coagulation:   Recent Labs  Lab 03/26/18  0007   INR 1.1       Significant Diagnostics:  CT: I have reviewed all pertinent results/findings within the past 24 hours  U/S: I have reviewed all pertinent results/findings within the past 24 hours     3/12/18-  US Abdomen-   The liver demonstrates no significant sonographic abnormalities.    There is  ascites visualized throughout the abdomen.  There are bilateral pleural effusions with a complex appearance of the fluid on the right side    3/13/18- CT Chest-   Status post heart transplantation with new multifocal subsegmental consolidation throughout both lungs.  Similar findings were present on the CT dated 12/12/2017 with partial  improvement on 02/14/2018 therefore we doubt malignancy.  Differential considerations include multifocal infectious pneumonia, hemorrhage, or aspiration.  We consider pulmonary edema due to abnormal capillary permeability or cardiac decompensation less likely.  Lymphoma could present similarly although felt less likely due to improvement on CT of 2/14/2018.  Continued moderate sized incompletely dependent right pleural effusion with rounded consolidation in the right lower lobe which probably represents rounded atelectasis, similar to prior, new from 7/19/2016.  Redemonstration of mild scattered ascites in the upper abdomen.    3/25/18 2D ECHO  CONCLUSIONS     1 - Normal left ventricular systolic function (EF 55-60%).     2 - Normal left ventricular diastolic function.     3 - Low normal right ventricular systolic function .     4 - Mild tricuspid regurgitation.     VTE Risk Mitigation         Ordered     heparin (porcine) injection 5,000 Units  Every 12 hours     Route:  Subcutaneous        03/23/18 8343

## 2018-03-26 NOTE — PLAN OF CARE
Problem: Physical Therapy Goal  Goal: Physical Therapy Goal  Goals to be met by: 18     Patient will increase functional independence with mobility by performin. Supine to sit with Moderate Assistance - not met  2. Sit to supine with Moderate Assistance - not met  3. Rolling to Left and Right with Minimal Assistance. - not met  4. Sit to stand transfer with Moderate Assistance - not met  5. Bed to chair transfer with Maximum Assistance - not met  6. Gait  x 20 feet with Minimal Assistance. - not met     Outcome: Ongoing (interventions implemented as appropriate)  Treatment completed and no goals met. Goals appropriate.

## 2018-03-26 NOTE — PROGRESS NOTES
DIALYSIS NOTE  Patient evaluated while undergoing Slow Low Efficiency Dialysis (SLED) indicated for CACHORRO and hemodynamic instability. No complications, tolerating session with current UFR. Will continue current support.

## 2018-03-26 NOTE — ASSESSMENT & PLAN NOTE
- baseline sCr 2.6-3.0 consistent with CKD stage IV  - anuric CACHORRO; likely ischemic ATN from prolonged pre-renal state (diarrhea) in setting of prograf renal vasoconstriction; cannot r/o septic ATN or Prograf toxicity  - SLED started 3/14  - remains anuric  - RRT held yesterday due to hypotension. Net +2.2L yesterday. Remains dependent on ventilator  - will restart SLED today with gentle UF: 250-300cc/hr as tolerated  - will continue to monitor fever curve; if fevers persist, may need to exchange trialysis catheter

## 2018-03-26 NOTE — ASSESSMENT & PLAN NOTE
- TTE 3/25/18 showed normal graft function. CFD in progress  - Had -ve DSE on 11/30/17  - As he was on Pred 2.5 mg qd at home and he became hypotensive over the weekend (Hydrocortisone was completed 3/23), will resume at 50 mg IV every 8 hours  - Tacro level 10.6 (goal 6-9). Will hold dose tonight and resume at 1 mg bid tomorrow  - Cellcept remains on hold

## 2018-03-26 NOTE — PT/OT/SLP PROGRESS
Physical Therapy Treatment    Patient Name:  Lv Crocker   MRN:  [id number]  Co-treat with OT  RT present   RN present   Recommendations:     Discharge Recommendations:   (TBD)   Discharge Equipment Recommendations:  (TBD)   Barriers to discharge: Inaccessible home (7 KIARRA to enter) and Decreased caregiver support at current functional level     Assessment:     Lv Crocker is a 42 y.o. male admitted with a medical diagnosis of Acute respiratory failure with hypoxia.  He presents with the following impairments/functional limitations:  weakness, impaired functional mobilty, impaired balance, gait instability, impaired self care skills, impaired endurance, pain, decreased upper extremity function, decreased lower extremity function. Pt required total assist for all functional mobility. Pt able to follow simple commands intermittently. Pt with increased alertness and responsiveness throughout session.    Rehab Prognosis:  Good; patient would benefit from acute skilled PT services to address these deficits and reach maximum level of function.      Recent Surgery: Procedure(s) (LRB):  ESOPHAGOGASTRODUODENOSCOPY (EGD) (N/A)  COLONOSCOPY (N/A) 13 Days Post-Op    Plan:     During this hospitalization, patient to be seen 5 x/week to address the above listed problems via gait training, therapeutic activities, therapeutic exercises, neuromuscular re-education  · Plan of Care Expires:  04/23/18   Plan of Care Reviewed with: patient, family    Subjective     Communicated with RN prior to session and family present.  Patient found in bed upon PT entry to room, agreeable to treatment.      Chief Complaint: none reported   Patient comments/goals: family wants pt to get better   Pain/Comfort:  · Pain Rating 1: 0/10  · Pain Rating Post-Intervention 1: 0/10    Patients cultural, spiritual, Cheondoism conflicts given the current situation: none reported     Objective:     Patient found with: pulse ox (continuous),  telemetry, blood pressure cuff, ventilator, central line, peripheral IV, NG tube, arterial line     General Precautions: Standard, fall, respiratory   Orthopedic Precautions:N/A   Braces: N/A     Functional Mobility:  · Bed Mobility:     · Rolling to L: total assistance  · Rolling to R: total assistance   · Scooting: total assistance and of 2 persons  · Supine to Sit: total assistance and of 2 persons  · Sit to Supine: total assistance and of 2 persons  · Transfers: not performed   · Gait: not performed   · Balance:   · Static sitting: total A   · Pt with increased neck flexion  · stretching of neck flexors 2x30 seconds  · Pt unable to hold head in neutral       AM-PAC 6 CLICK MOBILITY  Turning over in bed (including adjusting bedclothes, sheets and blankets)?: 2  Sitting down on and standing up from a chair with arms (e.g., wheelchair, bedside commode, etc.): 1  Moving from lying on back to sitting on the side of the bed?: 2  Moving to and from a bed to a chair (including a wheelchair)?: 1  Need to walk in hospital room?: 1  Climbing 3-5 steps with a railing?: 1  Total Score: 8       Therapeutic Activities and Exercises:  Educated pt on PT POC  Educated pt on importance of OOB activity     Rolling to L and R to assist RN with kamari cleaning and sheet change    Sitting EOB x 10 minutes to increase tolerance to OOB activity and to create optimal positioning for lung expansion    Patient left HOB elevated with all lines intact, call button in reach, RN notified and family present..    GOALS:    Physical Therapy Goals        Problem: Physical Therapy Goal    Goal Priority Disciplines Outcome Goal Variances Interventions   Physical Therapy Goal     PT/OT, PT Ongoing (interventions implemented as appropriate)     Description:  Goals to be met by: 18     Patient will increase functional independence with mobility by performin. Supine to sit with Moderate Assistance - not met  2. Sit to supine with Moderate  Assistance - not met  3. Rolling to Left and Right with Minimal Assistance. - not met  4. Sit to stand transfer with Moderate Assistance - not met  5. Bed to chair transfer with Maximum Assistance - not met  6. Gait  x 20 feet with Minimal Assistance. - not met                       Time Tracking:     PT Received On: 03/26/18  PT Start Time: 0930     PT Stop Time: 0952  PT Total Time (min): 22 min     Billable Minutes: Therapeutic Activity 15    Treatment Type: Treatment  PT/PTA: PT     PTA Visit Number: 0       Teresa Dudley PT, DPT  3/26/2018  [phone]

## 2018-03-26 NOTE — ASSESSMENT & PLAN NOTE
42 y.o. male presents with PMH of OHTX in November 2014, Hashimoto's thyroiditis, T1DM, CKD and history of recurrent acsites and pleural effusions now admitted for respiratory failure from severe RSV.    - Recommend repeat abdominal US to assess for ascites prior to PEG placement.   - Will plan for Trach/PEG in OR on Wednesday. However, PEG may be canceled if significant ascites is present.   - NPO Tuesday at midnight. Please hold am heparin on Wednesday.

## 2018-03-26 NOTE — PLAN OF CARE
Problem: Occupational Therapy Goal  Goal: Occupational Therapy Goal  Goals to be met by: 4/7/2018    Pt will follow 75% of motor commands with <2 verbal cues for repetition of task to maximize engagement in grooming task.  Pt will complete feeding with mod A.   Pt will complete supine with HOB slightly elevated to seated at EOB with mod A in prep for seated grooming task.  Pt will engage in BUE exercises in orders to increase strength to 3+/5 in BUE's to increase engagement in seated grooming task  Pt will sit at EOB for approx 10 minutes with mod A and unilateral UE support to complete grooming task  Pt will complete sit>stand from EOB with bed in lowest position with mod A in prep for [place] [facility]   Goals remain appropriate.

## 2018-03-26 NOTE — NURSING
Rounds Report: Attended interdisciplinary rounds this morning with the transplant team including SW, physicians, fellows,  mid-level providers, and transplant coordinators.  Discussed plan of care, including trach and PEG this week. No discharge date discussed.

## 2018-03-26 NOTE — ASSESSMENT & PLAN NOTE
- T max past 24 hours 101.1. Blood and urine cultures 3/25/18 with NGTD  - Vanc and Zosyn resumed 3/25/18. ID to see today

## 2018-03-26 NOTE — SUBJECTIVE & OBJECTIVE
Interval History: ID called back today as patient spiked fever of 101.5F; required overnight vasopressors (currenlty off them), unable to be extubated on passed days thought to be secondary to critical care illness; ventilatory settings FiO2 40%/PEEP5 (unchanged from previous days)    Previous work up done:  Aspergillus blood antigen and BAL 3/14/18 negative  Blastomyces urine Ag 3/13/18 negative  CMV BAL 3/14/18 negative  CMV blood 3/21/18 negative  Fungitell 3/13/18 negative  GI stool pathogen 3/13/18 negative  Crypto/Giardia 3/11/18 negative  Legionella 3/13/18  Negative  Rotavirus 3/11/18 negative  Strongyloides IgG 3/14/18 negative  Stool O+P 3/11/18 negative    Review of Systems   Unable to perform ROS: Intubated     Objective:     Vital Signs (Most Recent):  Temp: 98.8 °F (37.1 °C) (03/26/18 1100)  Pulse: (!) 120 (03/26/18 1548)  Resp: (!) 27 (03/26/18 1548)  BP: (!) 101/59 (03/26/18 1400)  SpO2: 100 % (03/26/18 1548) Vital Signs (24h Range):  Temp:  [98.8 °F (37.1 °C)-101.1 °F (38.4 °C)] 98.8 °F (37.1 °C)  Pulse:  [119-133] 120  Resp:  [0-35] 27  SpO2:  [100 %] 100 %  BP: ()/(52-70) 101/59  Arterial Line BP: ()/(43-71) 100/55     Weight: 64.7 kg (142 lb 10.2 oz)  Body mass index is 22.34 kg/m².    Estimated Creatinine Clearance: 41.1 mL/min (A) (based on SCr of 2.1 mg/dL (H)).    Physical Exam   Constitutional: He is intubated.   Eyes: No scleral icterus.   Cardiovascular: Normal rate and regular rhythm.    Pulmonary/Chest: He is intubated. He has no wheezes. He has no rales.   Abdominal: Soft. He exhibits no distension and no mass. There is no rebound.   Musculoskeletal: He exhibits no edema.   Right foot 1st toe slightly purpuric; 3rd toe tip necrotic   Lymphadenopathy:     He has no cervical adenopathy.   Skin: No rash noted. No erythema.       Significant Labs:   Bilirubin:   Recent Labs  Lab 03/13/18  2334  03/22/18  0358 03/23/18  0400 03/24/18  0400 03/25/18  0400 03/26/18  0404   BILIDIR  0.9*  --   --   --   --   --   --    BILITOT 1.1*  < > 2.4* 2.3* 2.2* 1.6* 1.2*   < > = values in this interval not displayed.  Blood Culture:     Recent Labs  Lab 03/18/18  0618 03/18/18  0748 03/21/18  0815 03/25/18  1554 03/25/18  1606   LABBLOO Gram stain aer bottle: Gram positive cocci in clusters resembling Staph   Results called to and read back by:Omid Cody RN 03/19/2018  10:55  COAGULASE-NEGATIVE STAPHYLOCOCCUS SPECIESOrganism is a probable contaminant No growth after 5 days. No growth after 5 days. No Growth to date No Growth to date     BMP:     Recent Labs  Lab 03/26/18  0007 03/26/18  0404   GLU  --  215*  215*   NA  --  141  141   K  --  4.8  4.8   CL  --  107  107   CO2  --  21*  21*   BUN  --  32*  32*   CREATININE  --  2.1*  2.1*   CALCIUM  --  8.1*  8.1*   MG 1.9  --      CBC:     Recent Labs  Lab 03/25/18  1545 03/26/18  0007 03/26/18  0739   WBC 4.02 2.98* 3.17*   HGB 7.8* 6.9* 8.5*   HCT 24.2* 20.8* 25.8*    133* 136*     CMP:     Recent Labs  Lab 03/25/18  0400 03/25/18  1357 03/25/18  2153 03/26/18  0404     140  140  140 138 139 141  141   K 4.5  4.5  4.5  4.5 4.7 4.8 4.8  4.8     108  108  108 106 105 107  107   CO2 19*  19*  19*  19* 21* 21* 21*  21*   *  178*  178*  178* 378* 268* 215*  215*   BUN 6  6  6  6 17 26* 32*  32*   CREATININE 0.5  0.5  0.5  0.5 1.1 1.7* 2.1*  2.1*   CALCIUM 8.3*  8.3*  8.3*  8.3* 8.1* 8.0* 8.1*  8.1*   PROT 6.6  --   --  6.0   ALBUMIN 2.8*  2.8*  2.8*  2.8* 2.5* 2.4* 2.4*  2.4*   BILITOT 1.6*  --   --  1.2*   ALKPHOS 214*  --   --  204*   AST 37  --   --  32   ALT 27  --   --  24   ANIONGAP 13  13  13  13 11 13 13  13   EGFRNONAA >60.0  >60.0  >60.0  >60.0 >60.0 48.0* 37.2*  37.2*     Microbiology Results (last 7 days)     Procedure Component Value Units Date/Time    Culture, Respiratory with Gram Stain [[id number]] Collected:  03/25/18 1534    Order Status:  Completed Specimen:   Respiratory from Sputum, Induced Updated:  03/26/18 1153     Respiratory Culture Normal respiratory hank     Gram Stain (Respiratory) <10 epithelial cells per low power field.     Gram Stain (Respiratory) Rare WBC's     Gram Stain (Respiratory) Rare Gram positive cocci    Blood culture [[id number]] Collected:  03/21/18 0815    Order Status:  Completed Specimen:  Blood from Peripheral, Forearm, Left Updated:  03/26/18 1012     Blood Culture, Routine No growth after 5 days.    Blood culture [[id number]] Collected:  03/25/18 1554    Order Status:  Completed Specimen:  Blood from Peripheral, Upper Arm, Right Updated:  03/26/18 0115     Blood Culture, Routine No Growth to date    Blood culture [[id number]] Collected:  03/25/18 1606    Order Status:  Completed Specimen:  Blood from Peripheral, Forearm, Left Updated:  03/26/18 0115     Blood Culture, Routine No Growth to date    Urine culture [[id number]]     Order Status:  Canceled Specimen:  Urine     Blood culture [[id number]] Collected:  03/18/18 0748    Order Status:  Completed Specimen:  Blood from Peripheral, Hand, Right Updated:  03/23/18 1012     Blood Culture, Routine No growth after 5 days.    Blood culture [[id number]] Collected:  03/18/18 0618    Order Status:  Completed Specimen:  Blood from Peripheral, Hand, Left Updated:  03/21/18 1108     Blood Culture, Routine Gram stain aer bottle: Gram positive cocci in clusters resembling Staph      Blood Culture, Routine Results called to and read back by:Omid Cody RN 03/19/2018  10:55     Blood Culture, Routine --     COAGULASE-NEGATIVE STAPHYLOCOCCUS SPECIES  Organism is a probable contaminant            Significant Imaging: I have reviewed all pertinent imaging results/findings within the past 24 hours.

## 2018-03-26 NOTE — PROGRESS NOTES
Called to bedside to administer Echo contrast.  Pt intubated.  Patient identified by 2 identifiers on armband, allergies reviewed in EPIC.  Wife at bedside, procedure explained, consent obtained & allergies reviewed. Denies previous reactions to blood transfusions.  GREGORY midline in place, flushed w/ 10cc NS pre & post contrast administration.  3cc Optison administered, echo images obtained.  Pt tolerated procedure well.

## 2018-03-26 NOTE — ASSESSMENT & PLAN NOTE
-S/P Bronch and intubation 3/14. Cultures ngtd.   -RSV positive. Completed course of Ribavarin/IVIG. Per lung transplant protocol for severe RSV(given myelosuppression).   -Aspergill Ag neg from BAL.   -Urine histo/blasto antigens neg, crypto antigen neg, urine legionella neg, and Quantiferon pending.  -Continue empiric posaconazole for now (transitioned to per tube to avoid accumulation to cyclodextrin carrier) with plan to stop after 10 days of -ve fungal cultures from BAL Should have been stopped 3/24/18 asa cultures from BAL remain -ve - will see what ID wants to do  -Stopped IV GCV prophylaxis as patient is several years out from transplant without evidence of active CMV disease; will check weekly quants -Await pending tests including: final BAL cultures and Quantiferon   - Off sedation and now nods head appropriately. Continues to fail SBP and may need a trach. Pulmonology managing vent

## 2018-03-26 NOTE — PROGRESS NOTES
DIALYSIS NOTE  Patient evaluated while undergoing Slow Low Efficiency Dialysis (SLED) indicated for CACHORRO and hemodynamic instability. No complications, tolerating session with current UFR. Filter clotted.  Will stop current support. Will reassess in am.

## 2018-03-26 NOTE — PLAN OF CARE
Problem: Spiritual Distress, Risk/Actual (Adult,Obstetrics,Pediatric)  Intervention: Facilitate Personal Exploration/Expression of Spirituality  Facts (Spiritual Perception of Illness): Wife expressed living with uncertainty of prognosis.     Feelings (Emotions/Experiences/Coping):  Wife, at times, was tearful; also demonstrated an internal strength.     Family/Friends:   Wife and wife's family present.     Coni (Belief/Meaning/Philosophy): Pt and family of Protestant coni. Wife mentioned good family and Christianity support. Their pastors have been to visit.     Future (Spiritual Care Plan of Action): Introduced and offered pastoral support with reflective listening and prayer upon request. Wife aware of 's presence as needed. Will continue to follow.

## 2018-03-26 NOTE — CONSULTS
Consult received. Full note to follow.    Please call for questions.    Thanks,  Lawrence Zavala, PGY-5  ID Fellow  Spectra [phone]  Pager: [phone]

## 2018-03-26 NOTE — ASSESSMENT & PLAN NOTE
41 y/o man with a ICM, s/p heart transplant 11/18/14; previously seen my ID on this admission for RSV-A pneumonia/multifocal pneumonia; ID called back today as patient had fever and was hypotensive overnight requiring vasopressors. Previous infectious work up done during admission reviewed.    - B/C 3/25/18 NGTD  - respiratory culture 3/25/18 NGTD  - CXR 3/26/18: continued right pleural effusion.  Diffuse increase in the pulmonary vascular interstitial and alveolar markings.  Focal opacity developing at the left base.  - would perform non contrast chest (if possible) to better evaluate CXR findings   - will switch antibiotic therapy to meropenem and linezolid; continue posaconazole for now

## 2018-03-26 NOTE — ASSESSMENT & PLAN NOTE
-S/P thoracentesis X 2, followed by VATS/pleurex cath placement (January 2018) with removal of pleurex (February 2018) and 3rd thoracentesis 2/14/18 with no significant findings on fluid removal. Moderate right pleural effusion stable by CXR 3/11/18 and chest CT 3/13/18  -Last paracentesis was in January. Abd US 3/12/18 confirmed ongoing ascites (not tense at all). Will consider getting paracentesis this admit. ID may rec this to R/O peritonitis

## 2018-03-26 NOTE — PROGRESS NOTES
[facility]  Pulm/Critical Care - Medicine  Progress Note    Patient Name: Lv Crocker  MRN: [id number]  Admission Date: 3/11/2018  Hospital Length of Stay: 14 days  Code Status: Full Code  Attending Provider: Jose A Lloyd MD  Primary Care Provider: Philippe Mohr MD   Principal Problem: Acute respiratory failure with hypoxia    Subjective:   Continues to require levophed on and off. Cultures thus far neg.  Review of Systems   Unable to obtain  Objective:     Vital Signs (Most Recent):  Temp: 98.8 °F (37.1 °C) (03/26/18 1100)  Pulse: (!) 120 (03/26/18 1548)  Resp: (!) 27 (03/26/18 1548)  BP: (!) 101/59 (03/26/18 1400)  SpO2: 100 % (03/26/18 1548) Vital Signs (24h Range):  Temp:  [98.8 °F (37.1 °C)-101.1 °F (38.4 °C)] 98.8 °F (37.1 °C)  Pulse:  [119-133] 120  Resp:  [0-35] 27  SpO2:  [100 %] 100 %  BP: ()/(52-70) 101/59  Arterial Line BP: ()/(43-71) 100/55     Weight: 64.7 kg (142 lb 10.2 oz)  Body mass index is 22.34 kg/m².      Intake/Output Summary (Last 24 hours) at 03/26/18 1608  Last data filed at 03/26/18 1500   Gross per 24 hour   Intake          2595.48 ml   Output              203 ml   Net          2392.48 ml       Physical Exam   Constitutional: He appears well-developed.    On mechanical ventilation. Eyes open and intermittently follows commands   HENT:   Head: Normocephalic and atraumatic.   Neck: Neck supple.   Cardiovascular: Regular rhythm.    tachycardic   Pulmonary/Chest: Effort normal.   On the vent--mechanical BS heard   Abdominal: Soft.   Neurological:   Awake and follows some commands   Skin: Skin is warm and dry.       Vents:  Vent Mode: A/C (03/26/18 1548)  Ventilator Initiated: Yes (03/14/18 0932)  Set Rate: 18 bmp (03/26/18 1548)  Vt Set: 410 mL (03/26/18 1548)  Pressure Support: 0 cmH20 (03/22/18 0909)  PEEP/CPAP: 5 cmH20 (03/26/18 1548)  Oxygen Concentration (%): 40 (03/26/18 1548)  Peak Airway Pressure: 32 cmH2O (03/26/18 1548)  Plateau  Pressure: 30 cmH20 (03/26/18 1253)  Total Ve: 12.9 mL (03/26/18 1548)  F/VT Ratio<105 (RSBI): (!) 63.08 (03/26/18 1548)    Lines/Drains/Airways     Central Venous Catheter Line                 Trialysis (Dialysis) Catheter 03/23/18 1433 right internal jugular 3 days          Drain                 NG/OG Tube 03/14/18 1300 [place] mouth 12 days          Airway                 Airway - Non-Surgical 03/14/18 0930 Endotracheal Tube 12 days          Arterial Line                 Arterial Line 03/14/18 1600 Right Femoral 12 days          Peripheral Intravenous Line                 Midline Catheter Insertion/Assessment  - Single Lumen 03/17/18 1454 Left cephalic vein (lateral side of arm) 18g x 8cm 9 days         Peripheral IV - Single Lumen 03/20/18 1640 Right Forearm 5 days                Significant Labs:    CBC/Anemia Profile:    Recent Labs  Lab 03/25/18  1545 03/26/18  0007 03/26/18  0739   WBC 4.02 2.98* 3.17*   HGB 7.8* 6.9* 8.5*   HCT 24.2* 20.8* 25.8*    133* 136*   MCV 92 93 92   RDW 20.9* 20.8* 19.2*        Chemistries:    Recent Labs  Lab 03/25/18  0400 03/25/18  0739 03/25/18  1357 03/25/18  1545 03/25/18  2153 03/26/18  0007 03/26/18  0404     140  140  140  --  138  --  139  --  141  141   K 4.5  4.5  4.5  4.5  --  4.7  --  4.8  --  4.8  4.8     108  108  108  --  106  --  105  --  107  107   CO2 19*  19*  19*  19*  --  21*  --  21*  --  21*  21*   BUN 6  6  6  6  --  17  --  26*  --  32*  32*   CREATININE 0.5  0.5  0.5  0.5  --  1.1  --  1.7*  --  2.1*  2.1*   CALCIUM 8.3*  8.3*  8.3*  8.3*  --  8.1*  --  8.0*  --  8.1*  8.1*   ALBUMIN 2.8*  2.8*  2.8*  2.8*  --  2.5*  --  2.4*  --  2.4*  2.4*   PROT 6.6  --   --   --   --   --  6.0   BILITOT 1.6*  --   --   --   --   --  1.2*   ALKPHOS 214*  --   --   --   --   --  204*   ALT 27  --   --   --   --   --  24   AST 37  --   --   --   --   --  32   MG 1.9 1.8  1.8  1.8  --  1.9  --  1.9  --    PHOS 1.2*   1.2*  1.2*  --  <1.0*  --  4.0  --  3.6           Assessment/Plan:   1. ARDS  2. CACHORRO  3. DIC      42 year-old male with a PMH of a heart transplant who was admitted to the hospital on 3/11 for diarrhea and cough. He was intubated on 3/14 for hypoxemic and hypercapnic resp failure. His chest imaging has remained stable over the past few days. He has developed severe weakness of his ext that could be related to the critical illness, steroids and drugs (ribavirin). He has not regained any of his strength over the pas few days. Plan:    -Consult ENT or gn surgery for PEG and trach (discussed with wife)  -ID to see pt and assess possibility of infection  -LTV ventilation.   -CRRT  -Levophed for shock--infectious workup begun  -DVT prophylaxis    Fails SBT. Plan to proceed to trach.  Geri Stringer MD  Critical Care - Medicine  [facility]

## 2018-03-26 NOTE — PLAN OF CARE
Problem: Patient Care Overview  Goal: Plan of Care Review  Outcome: Ongoing (interventions implemented as appropriate)  Pt improving. Levo weaned off. Pt up to edge of bed with PT today. CRRT restarted, well tolerated.

## 2018-03-26 NOTE — PROGRESS NOTES
"[facility]  Endocrinology  Progress Note    Admit Date: 3/11/2018     Reason for Consult: abnormal TFTs    Surgical Procedure and Date: s/p heart transplant 2014     Diabetes diagnosis year: 5yo (T1DM)     Home Diabetes Medications:    Levemir 15u qHS  Novolog 4u AC     How often checking glucose at home? 4 times daily   BG readings on regimen: 150-200s  Hypoglycemia on the regimen?  Yes, rarely - treats appropriately (light snack, glucose tab)  Missed doses on regimen?  No        Diabetes Complications include:     Hyperglycemia, Hypoglycemia  and Diabetic chronic kidney disease          Complicating diabetes co morbidities:   N/A     HPI:   Patient is a 42 y.o. male with a diagnosis of T1DM, HTN, hypothyroidism, s/p heart transplant.  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 He required serial paracentesis and thoracentesis until he underwent a VATS with pleurX catheter placement on 1/11/2018 and removed 2/7/18.       Presented to hospital secondary to diarrhea and cough.  Upon initial labs, TSH was decreased (0.101) and free T4 1.33.  Endocrinology consulted to assist in management of these labs.  He was last seen in clinic by Kamala Vázquez NP 11/2017.   Previous hospitalization, endocrine consulted for same problem.  During that visit, recommended decreasing LT4 to 125mcg daily. Unfortunately, patient was discharged on previous dose of 150mcg daily.     Interval HPI:   Overnight events: Remains intubated on TF. On intensive insulin gtt.  Eating:   NPO  Hypoglycemia and intervention: No  Fever: No  TPN and/or TF: Yes  If yes, type of TF/TPN and rate: Glucerna 1.5 @ 50/hr    BP 97/60   Pulse (!) 122   Temp 98.8 °F (37.1 °C) (Oral)   Resp (!) 30   Ht 5' 7" (1.702 m)   Wt 64.7 kg (142 lb 10.2 oz)   SpO2 100%   BMI 22.34 kg/m²       Labs Reviewed and Include      Recent Labs  Lab " 03/26/18  0404   *  215*   CALCIUM 8.1*  8.1*   ALBUMIN 2.4*  2.4*   PROT 6.0     141   K 4.8  4.8   CO2 21*  21*     107   BUN 32*  32*   CREATININE 2.1*  2.1*   ALKPHOS 204*   ALT 24   AST 32   BILITOT 1.2*     Lab Results   Component Value Date    WBC 3.17 (L) 03/26/2018    HGB 8.5 (L) 03/26/2018    HCT 25.8 (L) 03/26/2018    MCV 92 03/26/2018     (L) 03/26/2018     No results for input(s): TSH, FREET4 in the last 168 hours.  Lab Results   Component Value Date    HGBA1C 6.9 (H) 02/11/2018       Nutritional status:   Body mass index is 22.34 kg/m².  Lab Results   Component Value Date    ALBUMIN 2.4 (L) 03/26/2018    ALBUMIN 2.4 (L) 03/26/2018    ALBUMIN 2.4 (L) 03/25/2018     Lab Results   Component Value Date    PREALBUMIN 5 (L) 03/15/2018    PREALBUMIN 18 (L) 03/24/2015    PREALBUMIN 19 (L) 12/17/2014       Estimated Creatinine Clearance: 41.1 mL/min (A) (based on SCr of 2.1 mg/dL (H)).    Accu-Checks  Recent Labs      03/25/18   1821  03/25/18 2001 03/25/18   2156  03/26/18   0007  03/26/18   0218  03/26/18   0407  03/26/18   0610  03/26/18   0734  03/26/18   1006  03/26/18   1221   POCTGLUCOSE  336*  258*  257*  271*  216*  227*  226*  231*  135*  161*       Current Medications and/or Treatments Impacting Glycemic Control  Immunotherapy:  Immunosuppressants         Stop Route Frequency     tacrolimus capsule 1 mg      -- Oral 2 times daily        Steroids:   Hormones     Start     Stop Route Frequency Ordered    03/26/18 0915  hydrocortisone sodium succinate injection 50 mg      -- IV Every 8 hours 03/26/18 0909        Pressors:    Autonomic Drugs     Start     Stop Route Frequency Ordered    03/25/18 1430  norepinephrine 32 mg in sodium chloride 0.9% 250 mL infusion     Question Answer Comment   Titrate by: (in mcg/kg/min) 0.01    Titrate interval: (in minutes) 10    Titrate to maintain: (MAP or SBP) MAP    Greater than: (in mmHg) 60    Maximum dose: (in mcg/kg/min) 0.05         -- IV Continuous 03/25/18 1419        Hyperglycemia/Diabetes Medications: Antihyperglycemics     Start     Stop Route Frequency Ordered    03/14/18 1630  insulin regular (Humulin R) 100 Units in sodium chloride 0.9% 100 mL infusion     Question:  Insulin Rate Adjustment (DO NOT MODIFY ANSWER)  Answer:  [email or web address]    -- IV Continuous 03/14/18 1530          ASSESSMENT and PLAN    * Acute respiratory failure with hypoxia    Per primary        On enteral nutrition    May adversely impact bg          Type 1 diabetes mellitus with hyperglycemia    BG goal 140-180  Continue intensive insulin gtt q2hr checks while remains intubated.     If BG continues to drop while on insulin rate 0.1u/hr, will need to add D5 IVF to be able to continue continuous insulin gtt in pt with T1DM. Do not suspend gtt >1 hr, pt is T1DM.    Low c peptide with glc 164. Treat as type 1.   Neg MODE 2013, neg islet cell ab on this admission.   Cannot order insulin ab while on insulin, and ZnT8 unavailable in labs.          Hypothyroidism due to Hashimoto's thyroiditis    Abnormal TFTs upon admit.  Unclear if secondary to illness, but with evidence of iatrogenic hyperthyroidism in past while on above weight based dose.     Continue LT4 ng 125mcg daily (decreased from 150mcg)  Repeat TFTs in 4 weeks (due ~4/15)          Acute renal failure superimposed on stage 4 chronic kidney disease    Avoid insulin stacking          Heart transplanted    Per transplant  Avoid hypoglycemia              Corey Johnson MD  Endocrinology  [facility]

## 2018-03-26 NOTE — CONSULTS
[facility]  Thoracic Surgery  Consult Note    Patient Name: Lv Crocker  MRN: [id number]  Code Status: Full Code  Admission Date: 3/11/2018  Hospital Length of Stay: 14 days  Consult Requesting Physician:   Consulting Physician: Dr. Cordero  Primary Care Provider: Philippe Mohr MD    Inpatient consult to General Surgery  Consult performed by: FELISA GERARD  Consult ordered by: SILVIANO BRINK  Reason for consult: Trach/PEG        Subjective:     Reason for Consult: Trach/PEG    History of Present Illness: 42 year old male with PMH of 42 y.o. male presents with PMH of OHTX in November 2014, Hashimoto's thyroiditis, T1DM, CKD and history of recurrent acsites and pleural effusions now admitted for respiratory failure from severe RSV. He is known to thoracic surgery as he underwent a right VATS drainage of effusion and Pleurx placement on 1/11/18 by Dr. James. Pleurx was removed in clinic on 2/7/18 due to minimal output and potential clogging. In February he was admitted for diarrhea, worsening nonproductive cough and near syncopal episodes. Most recently admitted on 3/13/18 after he was found to be febrile and hypoxic in endoscopy lab.  Intubated on 3/14/18 for hypoxemic and hypercapnic resp failure. Chest CT at that time showed small right pleural effusion and bilateral subsegmental multifocal consolidation with air bronchograms more extensive in left lung. Since then he has had a complicated hospital stay for presumed severe RSV including acute kidney failure on CRRT, intermittent pressor requirement, DIC and several failed SBTs.    Currently on FiO2 of 40% and 5 PEEP. Tachycardic but not on pressors. Has been on Vanc, Zosyn and  Posoconazole . Switched to Linezolid and Meropenum today.   On Tacrolimus and Hydrocortisone for immunosuppression, Cellcept held.        PSH significant for OHTx in 2014 and cholecystectomy. Prior to right pleurx placement, he was getting  "monthly paracenteses and thoracentesis.     No current facility-administered medications on file prior to encounter.        Review of patient's allergies indicates:   Allergen Reactions    No known drug allergies        Past Medical History:   Diagnosis Date    Arthritis     Ascites     Thought to be 2/2 heart tpx; liver bx 3/31/17 w/o significant fibrosis    Cardiomyopathy     Depression     Controlled "for the most part" on Lexipro    Encounter for blood transfusion     during transplant    GERD (gastroesophageal reflux disease)     Hashimoto's disease     History of CHF (congestive heart failure)     Previously EF 20% (prior to heart transplant); last EF 60%, PAP 41 as of 12/12/17; throught to be 2/2 genetics & DM1    History of coronary artery disease     H/o Coronary artery disease; resolved since heart transplant 2014    History of myocardial infarction     h/o MI x3 prior to heart transplant    HLD (hyperlipidemia) 6/11/2015    Hypoglycemia unawareness in type 1 diabetes mellitus     Hypothyroid     Initially hyperthyroid 2/2 Hoshimoto's thyroiditis; now on levothyroxine 150 mcg qd    Pleural effusion due to another disorder     Thought to be 2/2 heart tpx; s/p PleuRx catheter placement and removal after 1-2 months    Pulmonary hypertension assoc with unclear multi-factorial mechanisms 12/12/2017    PAP 41 (EF 60%) on ECHO 12/12/17    Syncope 6/30/2015    Type I diabetes mellitus, well controlled     Well controlled; Dx'd @5y/o; on N insulin 20U BID & Humalog 10U w/meals +SSI; Glu 89 11/9/17; A1c 7.2% 4/5/17    Unspecified essential hypertension 05/29/2014    Well controlled; Last /57 on 11/9/17     Past Surgical History:   Procedure Laterality Date    CARDIAC CATHETERIZATION      CARDIAC SURGERY      CHOLECYSTECTOMY      COLONOSCOPY N/A 3/13/2018    Procedure: COLONOSCOPY;  Surgeon: Tim Spencer MD;  Location: [facility] ([address] [place] [address]);  Service: Endoscopy;  Laterality: N/A;    " CORONARY ANGIOPLASTY      FOOT SPLIT TRANSFER OF THE POSTERIOR TIBIALIS TENDON PROCEDURE      HEART TRANSPLANT  2014    KNEE SURGERY      PleuRx catheter placement  01/11/2018    Plan for removal after 1-2 months by Dr. James in cardiothoracic surgery    s/p oht  November 2014    stent placemnet       Family History     Problem Relation (Age of Onset)    Cancer Brother (50)    Diabetes Mother, Father, Brother, Son, Sister, Maternal Uncle, Paternal Aunt, Maternal Grandmother, Maternal Grandfather    Heart disease Mother, Brother    Hypertension Mother, Father, Brother    Kidney disease Mother, Father    Stroke Father, Brother    Vision loss Brother        Social History Main Topics    Smoking status: Never Smoker    Smokeless tobacco: Never Used    Alcohol use No    Drug use: No    Sexual activity: Yes     Partners: Female     Review of Systems   Unable to perform ROS: Intubated     Objective:     Vital Signs (Most Recent):  Temp: 98.8 °F (37.1 °C) (03/26/18 1100)  Pulse: (!) 120 (03/26/18 1548)  Resp: (!) 27 (03/26/18 1548)  BP: (!) 101/59 (03/26/18 1400)  SpO2: 100 % (03/26/18 1548) Vital Signs (24h Range):  Temp:  [98.8 °F (37.1 °C)-101.1 °F (38.4 °C)] 98.8 °F (37.1 °C)  Pulse:  [119-130] 120  Resp:  [0-35] 27  SpO2:  [100 %] 100 %  BP: ()/(52-70) 101/59  Arterial Line BP: ()/(43-71) 100/55     Weight: 64.7 kg (142 lb 10.2 oz)  Body mass index is 22.34 kg/m².    Intake/Output - Last 3 Shifts       03/24 0700 - 03/25 0659 03/25 0700 - 03/26 0659 03/26 0700 - 03/27 0659    I.V. (mL/kg) 4634.7 (71.6) 522.8 (8.1) 132.1 (2)    Blood  401     NG/GT 1230 1320 540    IV Piggyback 250 700     Total Intake(mL/kg) 6114.7 (94.5) 2943.8 (45.5) 672.1 (10.4)    Other 8068 353 495    Stool 0      Total Output 8068 353 495    Net -1953.4 +2590.8 +177.1           Stool Occurrence 1 x            SpO2: 100 %  O2 Device (Oxygen Therapy): ventilator    Physical Exam   Constitutional: He is intubated.   HENT:    Head: Normocephalic and atraumatic.   Neck: Normal range of motion. No tracheal deviation present. No thyromegaly present.   Cardiovascular: Regular rhythm and intact distal pulses.  Tachycardia present.    No murmur heard.  Pulmonary/Chest: He is intubated. He has decreased breath sounds in the right lower field and the left lower field.   Abdominal: Soft. Bowel sounds are normal. He exhibits no distension. There is no tenderness.   Musculoskeletal: He exhibits no edema.   Lymphadenopathy:     He has no cervical adenopathy.   Neurological: He is alert.       Significant Labs:  ABGs:   Recent Labs  Lab 03/26/18  0742   PH 7.460*   PCO2 24.2*   PO2 111*   HCO3 17.2*   POCSATURATED 99   BE -7     Cardiac markers: No results for input(s): CKMB, CPKMB, TROPONINT, TROPONINI, MYOGLOBIN in the last 48 hours.  CBC:   Recent Labs  Lab 03/26/18  1617   WBC 2.21*   RBC 2.66*   HGB 7.9*   HCT 24.4*   *   MCV 92   MCH 29.7   MCHC 32.4     CMP:   Recent Labs  Lab 03/26/18  0404 03/26/18  1617   *  215* 172*   CALCIUM 8.1*  8.1* 8.1*   ALBUMIN 2.4*  2.4* 2.2*   PROT 6.0  --      141 142   K 4.8  4.8 5.2*   CO2 21*  21* 21*     107 108   BUN 32*  32* 28*   CREATININE 2.1*  2.1* 1.9*   ALKPHOS 204*  --    ALT 24  --    AST 32  --    BILITOT 1.2*  --      Coagulation:   Recent Labs  Lab 03/26/18  0007   INR 1.1       Significant Diagnostics:  CT: I have reviewed all pertinent results/findings within the past 24 hours  U/S: I have reviewed all pertinent results/findings within the past 24 hours     3/12/18-  US Abdomen-   The liver demonstrates no significant sonographic abnormalities.    There is  ascites visualized throughout the abdomen.  There are bilateral pleural effusions with a complex appearance of the fluid on the right side    3/13/18- CT Chest-   Status post heart transplantation with new multifocal subsegmental consolidation throughout both lungs.  Similar findings were present on the  CT dated 12/12/2017 with partial improvement on 02/14/2018 therefore we doubt malignancy.  Differential considerations include multifocal infectious pneumonia, hemorrhage, or aspiration.  We consider pulmonary edema due to abnormal capillary permeability or cardiac decompensation less likely.  Lymphoma could present similarly although felt less likely due to improvement on CT of 2/14/2018.  Continued moderate sized incompletely dependent right pleural effusion with rounded consolidation in the right lower lobe which probably represents rounded atelectasis, similar to prior, new from 7/19/2016.  Redemonstration of mild scattered ascites in the upper abdomen.    3/25/18 2D ECHO  CONCLUSIONS     1 - Normal left ventricular systolic function (EF 55-60%).     2 - Normal left ventricular diastolic function.     3 - Low normal right ventricular systolic function .     4 - Mild tricuspid regurgitation.     VTE Risk Mitigation         Ordered     heparin (porcine) injection 5,000 Units  Every 12 hours     Route:  Subcutaneous        03/23/18 5961        Assessment/Plan:     * Acute respiratory failure with hypoxia    42 y.o. male presents with PMH of OHTX in November 2014, Hashimoto's thyroiditis, T1DM, CKD and history of recurrent acsites and pleural effusions now admitted for respiratory failure from severe RSV.    - Recommend repeat abdominal US to assess for ascites prior to PEG placement.   - Will plan for Trach/PEG in OR on Wednesday. However, PEG may be canceled if significant ascites is present.   - NPO Tuesday at midnight. Please hold am heparin on Wednesday.           Galilea Mar PA-C  Thoracic Surgery  [id number]

## 2018-03-26 NOTE — PROGRESS NOTES
"[facility]  Heart Transplant  Progress Note    Patient Name: Lv Crocker  MRN: [id number]  Admission Date: 3/11/2018  Hospital Length of Stay: 14 days  Attending Physician: Jose A Lloyd MD  Primary Care Provider: Philippe Mohr MD  Principal Problem:Acute respiratory failure with hypoxia    Subjective:     Interval History: Arouses to voice and nods head appropriately, nods "no" when I ask him if he can move his extremities. T max past 24 hours 101.1. Continue to require Levo    Continuous Infusions:   sodium chloride 0.9% 200 mL/hr at 03/24/18 2045    dexmedetomidine (PRECEDEX) infusion Stopped (03/21/18 0750)    insulin (HUMAN R) infusion (adults) 5.54 Units/hr (03/26/18 0800)    norepinephrine bitartrate-D5W Stopped (03/26/18 0845)     Scheduled Meds:   chlorhexidine  15 mL Mouth/Throat BID    docusate  100 mg Per NG tube BID    escitalopram oxalate  20 mg Oral Daily    famotidine (PF)  20 mg Intravenous BID    heparin (porcine)  5,000 Units Subcutaneous Q12H    hydrocortisone sodium succinate  50 mg Intravenous Q8H    levothyroxine  125 mcg Per NG tube Before breakfast    metoclopramide HCl  5 mg Intravenous Q6H    piperacillin-tazobactam (ZOSYN) IVPB  4.5 g Intravenous Q12H    polyethylene glycol  17 g Per NG tube Daily    posaconazole 200 mg/5ml (40 mg/ml)  200 mg Per NG tube TID WM    sodium chloride 0.9%  3 mL Intravenous Q8H    tacrolimus  2 mg Oral Daily     PRN Meds:acetaminophen, dextrose 50%, dextrose 50%, glucagon (human recombinant), glucose, glucose, k phos di & mono-sod phos mono    Review of patient's allergies indicates:   Allergen Reactions    No known drug allergies      Objective:     Vital Signs (Most Recent):  Temp: 99 °F (37.2 °C) (03/26/18 0700)  Pulse: (!) 127 (03/26/18 0849)  Resp: (!) 9 (03/26/18 0849)  BP: 101/64 (03/26/18 0800)  SpO2: 100 % (03/26/18 0849) Vital Signs (24h Range):  Temp:  [99 °F (37.2 °C)-101.1 °F (38.4 °C)] 99 °F " (37.2 °C)  Pulse:  [122-133] 127  Resp:  [9-33] 9  SpO2:  [100 %] 100 %  BP: ()/(52-70) 101/64  Arterial Line BP: (101-139)/(45-71) 103/49     Patient Vitals for the past 72 hrs (Last 3 readings):   Weight   03/25/18 0155 64.7 kg (142 lb 10.2 oz)     Body mass index is 22.34 kg/m².      Intake/Output Summary (Last 24 hours) at 03/26/18 0920  Last data filed at 03/26/18 0845   Gross per 24 hour   Intake          2778.11 ml   Output                0 ml   Net          2778.11 ml       Hemodynamic Parameters:       Telemetry: ST    Physical Exam   Constitutional: He appears well-developed and well-nourished.   Appears ill   HENT:   Head: Normocephalic and atraumatic.   Eyes: Conjunctivae are normal. Pupils are equal, round, and reactive to light.   Neck: No thyromegaly present.   RIJ trialysis   Cardiovascular: Regular rhythm.    Tachycardic   Pulmonary/Chest:   Intubated and ventilated  Breath sounds are slightly decreased right base. Essentially CTA bilaterally   Abdominal: Soft.   Hypoactive bowel sounds. + ascites   Musculoskeletal:   Trace gen edema   Neurological:   Intubated. Arouses to voice and nods head appropriately   Skin: Skin is warm and dry. Capillary refill takes less than 2 seconds. There is pallor.       Significant Labs:  CBC:    Recent Labs  Lab 03/25/18  1545 03/26/18  0007 03/26/18  0739   WBC 4.02 2.98* 3.17*   RBC 2.64* 2.24* 2.82*   HGB 7.8* 6.9* 8.5*   HCT 24.2* 20.8* 25.8*    133* 136*   MCV 92 93 92   MCH 29.5 30.8 30.1   MCHC 32.2 33.2 32.9     BNP:  No results for input(s): BNP in the last 168 hours.    Invalid input(s): BNPTRIAGELBLO  CMP:    Recent Labs  Lab 03/24/18  0400  03/25/18  0400 03/25/18  1357 03/25/18  2153 03/26/18  0404   *  158*  158*  < > 178*  178*  178*  178* 378* 268* 215*  215*   CALCIUM 8.4*  8.4*  8.4*  < > 8.3*  8.3*  8.3*  8.3* 8.1* 8.0* 8.1*  8.1*   ALBUMIN 3.0*  3.0*  3.0*  < > 2.8*  2.8*  2.8*  2.8* 2.5* 2.4* 2.4*  2.4*    PROT 6.6  --  6.6  --   --  6.0     138  138  < > 140  140  140  140 138 139 141  141   K 4.5  4.5  4.5  < > 4.5  4.5  4.5  4.5 4.7 4.8 4.8  4.8   CO2 21*  21*  21*  < > 19*  19*  19*  19* 21* 21* 21*  21*     103  103  < > 108  108  108  108 106 105 107  107   BUN 8  8  8  < > 6  6  6  6 17 26* 32*  32*   CREATININE 0.7  0.7  0.7  < > 0.5  0.5  0.5  0.5 1.1 1.7* 2.1*  2.1*   ALKPHOS 206*  --  214*  --   --  204*   ALT 33  --  27  --   --  24   AST 43*  --  37  --   --  32   BILITOT 2.2*  --  1.6*  --   --  1.2*   < > = values in this interval not displayed.   Coagulation:     Recent Labs  Lab 03/23/18  1550  03/25/18  0739 03/25/18  1545 03/26/18  0007   INR 1.4*  < > 1.1 1.0 1.1   APTT 25.5  --   --   --   --    < > = values in this interval not displayed.  LDH:    Recent Labs  Lab 03/23/18  1550   *     Microbiology:  Microbiology Results (last 7 days)     Procedure Component Value Units Date/Time    Blood culture [[id number]] Collected:  03/25/18 1554    Order Status:  Completed Specimen:  Blood from Peripheral, Upper Arm, Right Updated:  03/26/18 0115     Blood Culture, Routine No Growth to date    Blood culture [[id number]] Collected:  03/25/18 1606    Order Status:  Completed Specimen:  Blood from Peripheral, Forearm, Left Updated:  03/26/18 0115     Blood Culture, Routine No Growth to date    Culture, Respiratory with Gram Stain [[id number]] Collected:  03/25/18 1534    Order Status:  Completed Specimen:  Respiratory from Sputum, Induced Updated:  03/25/18 2115     Gram Stain (Respiratory) <10 epithelial cells per low power field.     Gram Stain (Respiratory) Rare WBC's     Gram Stain (Respiratory) Rare Gram positive cocci    Urine culture [[id number]]     Order Status:  Canceled Specimen:  Urine     Blood culture [[id number]] Collected:  03/21/18 0815    Order Status:  Completed Specimen:  Blood from Peripheral, Forearm, Left Updated:  03/25/18 1012      Blood Culture, Routine No Growth to date     Blood Culture, Routine No Growth to date     Blood Culture, Routine No Growth to date     Blood Culture, Routine No Growth to date     Blood Culture, Routine No Growth to date    Blood culture [[id number]] Collected:  03/18/18 0748    Order Status:  Completed Specimen:  Blood from Peripheral, Hand, Right Updated:  03/23/18 1012     Blood Culture, Routine No growth after 5 days.    Blood culture [[id number]] Collected:  03/18/18 0618    Order Status:  Completed Specimen:  Blood from Peripheral, Hand, Left Updated:  03/21/18 1108     Blood Culture, Routine Gram stain aer bottle: Gram positive cocci in clusters resembling Staph      Blood Culture, Routine Results called to and read back by:Omid Cody RN 03/19/2018  10:55     Blood Culture, Routine --     COAGULASE-NEGATIVE STAPHYLOCOCCUS SPECIES  Organism is a probable contaminant            I have reviewed all pertinent labs within the past 24 hours.    Estimated Creatinine Clearance: 41.1 mL/min (A) (based on SCr of 2.1 mg/dL (H)).    Diagnostic Results:  I have reviewed all pertinent imaging results/findings within the past 24 hours.    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 Most recently has had ongoing issues with his lungs, had gotten 2 thoracenteses, after which he underwent VATS 01/19/18 followed by pleurex catheter placement and effusion drainage 01/11/18, subsequently had it removed 02/07/18 after drainage had decreased despite multiple attempts to drain it. Has been having significant coughing fits that result in him being near-syncopal at times, although difficult to say if he has passed out or not- patient most recently was admitted to the hospital for increased AGUILAR, coughing and pre-syncope 02/11-02/14/18 at [facility].    Patient was started on antibiotics for suspected bacterial infection, with some diarrhea, bronchitis, and possible pneumonia. Ct chest showed some pleural fluid collection and after talking with Dr. James, we arranged for him to receive a diagnostic tap with IR, from which the fluid collection demonstrated no growth or significant findings at all really. Cell counts do not appear to have been sent but will recheck. Patient states since his hospital stay, yesterday had a significant coughing fit again, after which he nearly passed out, is being seen in clinic today for this. Denies any cardiopulmonary complaints, no leg swelling, has some abdominal swelling, which is moderate for him. Denies significant shortness of breath with mild exertion, had 6MWT yesterday to see if he qualified for home O2, and his O2 sats actually improved after recovery from where he started initially. He and his wife admit he is in bed most days, not very active.     Mr. Crocker presented to the ED 3/11/18 with approximately 3 weeks of worsening diarrhea and 2-3 days of sinus pressure, drainage, and worsened cough.  He notes that he had a coughing fit last night and passed out, coughed throughout most of the night.  This also happened on 2/25/18.  He last saw Dr Ferreira in clinic on 2/20/18 where he was taken off myfortic and switched to cellcept (he hadn't been on cellcept because of leukopenia when they tried post-transplant).  Though his diarrhea had improved after his last discharge, he states it has increased now to 15x/day, clear/yellow/green, no fevers, no exacerbating foods per say.  He was taken back off the cellcept and placed back on myfortic this past week and has not noticed immediate change in diarrhea. He also reports his baseline coughing fits havent improved and are minimally responsive to tessalon pearls.  Came on last night, he lost consciousness during a fit once which is relatively normal for him, and had two more  during HPI.  He notes no sick contacts but does state he's had some URI symptoms as above.  His baseline vitals are usually -120 and BP 90s/60s-110s/70s.  He reports a history of intermittent diarrhea - did have Cdiff many years ago but this smells different.  No abdominal pain, no nausea, no vomiting.  No blood in diarrhea.  Appears last c-scope in 2015 with no evidence of infection or inflammatory processes.  He does report IBS which is different that this.       * Acute respiratory failure with hypoxia    -S/P Bronch and intubation 3/14. Cultures ngtd.   -RSV positive. Completed course of Ribavarin/IVIG. Per lung transplant protocol for severe RSV(given myelosuppression).   -Aspergill Ag neg from BAL.   -Urine histo/blasto antigens neg, crypto antigen neg, urine legionella neg, and Quantiferon pending.  -Continue empiric posaconazole for now (transitioned to per tube to avoid accumulation to cyclodextrin carrier) with plan to stop after 10 days of -ve fungal cultures from BAL Should have been stopped 3/24/18 asa cultures from BAL remain -ve - will see what ID wants to do  -Stopped IV GCV prophylaxis as patient is several years out from transplant without evidence of active CMV disease; will check weekly quants -Await pending tests including: final BAL cultures and Quantiferon   - Off sedation and now nods head appropriately. Continues to fail SBP and may need a trach. Pulmonology managing vent        Fever    - T max past 24 hours 101.1. Blood and urine cultures 3/25/18 with NGTD  - Vanc and Zosyn resumed 3/25/18. ID to see today        Hypotension    - Require resumation of Levo over weekend 2/2 hypotension (2/2 sepsis versus stopping steroids?)        Restrictive cardiomyopathy    -S/P thoracentesis X 2, followed by VATS/pleurex cath placement (January 2018) with removal of pleurex (February 2018) and 3rd thoracentesis 2/14/18 with no significant findings on fluid removal. Moderate right pleural effusion  stable by CXR 3/11/18 and chest CT 3/13/18  -Last paracentesis was in January. Abd US 3/12/18 confirmed ongoing ascites (not tense at all). Will consider getting paracentesis this admit. ID may rec this to R/O peritonitis          Heart transplanted    - TTE 3/25/18 showed normal graft function. CFD in progress  - Had -ve DSE on 11/30/17  - As he was on Pred 2.5 mg qd at home and he became hypotensive over the weekend (Hydrocortisone was completed 3/23), will resume at 50 mg IV every 8 hours  - Tacro level 10.6 (goal 6-9). Will hold dose tonight and resume at 1 mg bid tomorrow  - Cellcept remains on hold        Acute renal failure superimposed on stage 4 chronic kidney disease    - Appreciate Neph Cx.   - CRRT to be resumed tonight (on hold 2/2 hypotension)        Ileus    -Having bowel movements  -No residuals now. Continue tube feeds with goal 50 cc/hr          Type 1 diabetes mellitus with stage 3 chronic kidney disease    - Appreciate Endocrine's recs        Hypothyroidism due to Hashimoto's thyroiditis    - TSH low at 0.101 with normal FT4. Appreciate Endocrine's recs        DIC (disseminated intravascular coagulation)    - resolved at the moment  - secondary to sepsis on admission  - DIC labs including FSP, platelets negative for ongoing status  - cryoglobulin and FFP to be given if needed to keep fibrinogen above 100 and INR below 1.8  - plat[place] level at 100 today, INR 1.1  - Continue to check fibrinogen, INR and Plts level Q8h.            Pancytopenia    - Appreciate Hem Cx.   -The patient developed thrombocytopenia 5 days after receiving enoxaparin and has a 4T score of 5 indicating a ~ 14% chance of HIT.  The patients HIT panel was positive with OD of 0.431 indicating a 1-5% chance of having HIT. HILDA -ve   -The patients thrombocytopenia could be drug induced as well.   -The patient had a low fibrinogen, elevated INR, shistocytes on peripheral smear and elevated LDH which all point towards DIC. The  haptoglobin is elevated which goes against hemolytic anemia,; however, haptoglobin is also an acute phase reactant.  As DIC is the higher on the differential would treat as such by treating underlying cause which is likely sepsis.       -Fibrinogen, INR and plt count every 12 hours.  -Give cryoprecipitate to get fibrinogen over 100.  Would give one unit at a time.  -Give FFP to get the INR below 1.8.  Would give at least two units at a time.  May consider giving the patient Vitamin K given recent use of coumadin.          Shock, unspecified    - resumed pressors (levophed) over weekend  - Infectious romero negative so far (CXR- negative).  - See above          Type 1 diabetes mellitus with hyperglycemia    - on insulin gtt        Anemia    - H&[place] [id number]  Heart Transplant  [facility]

## 2018-03-27 PROBLEM — R57.9 SHOCK, UNSPECIFIED: Status: RESOLVED | Noted: 2018-01-01 | Resolved: 2018-01-01

## 2018-03-27 NOTE — SUBJECTIVE & OBJECTIVE
Interval History: afebrile, scheduled for tracheostomy and PEG placement tomorrow     Review of Systems   Unable to perform ROS: Intubated     Objective:     Vital Signs (Most Recent):  Temp: 98.1 °F (36.7 °C) (03/27/18 1500)  Pulse: (!) 124 (03/27/18 1522)  Resp: (!) 24 (03/27/18 1522)  BP: (!) 102/59 (03/27/18 1400)  SpO2: 100 % (03/27/18 1522) Vital Signs (24h Range):  Temp:  [97.7 °F (36.5 °C)-98.7 °F (37.1 °C)] 98.1 °F (36.7 °C)  Pulse:  [117-125] 124  Resp:  [3-32] 24  SpO2:  [100 %] 100 %  BP: ()/(56-67) 102/59  Arterial Line BP: ()/(43-81) 107/57     Weight: 74.7 kg (164 lb 10.9 oz)  Body mass index is 25.79 kg/m².    Estimated Creatinine Clearance: 110.2 mL/min (based on SCr of 0.8 mg/dL).    Physical Exam   Constitutional: He appears well-developed and well-nourished.   HENT:   Head: Normocephalic and atraumatic.   Eyes: Conjunctivae and EOM are normal. Pupils are equal, round, and reactive to light.   Neck: Normal range of motion. Neck supple.   Cardiovascular: Normal rate, regular rhythm and normal heart sounds.    Pulmonary/Chest: Effort normal and breath sounds normal.   Abdominal: Soft. Bowel sounds are normal. He exhibits distension. There is no tenderness. There is no rebound.   Musculoskeletal: Normal range of motion. He exhibits edema. He exhibits no tenderness or deformity.   Neurological: He is alert.   Skin: No rash noted. No erythema.       Significant Labs:   Blood Culture:   Recent Labs  Lab 03/18/18  0618 03/18/18  0748 03/21/18  0815 03/25/18  1554 03/25/18  1606   LABBLOO Gram stain aer bottle: Gram positive cocci in clusters resembling Staph   Results called to and read back by:Omid Cody RN 03/19/2018  10:55  COAGULASE-NEGATIVE STAPHYLOCOCCUS SPECIESOrganism is a probable contaminant No growth after 5 days. No growth after 5 days. No Growth to date  No Growth to date No Growth to date  No Growth to date     BMP:   Recent Labs  Lab 03/27/18  1502   *      K  4.3      CO2 27   BUN 13   CREATININE 0.8   CALCIUM 8.2*   MG 1.8     CBC:   Recent Labs  Lab 03/26/18  1617 03/26/18  2017 03/27/18  0801   WBC 2.21* 2.91* 3.33*   HGB 7.9* 7.9* 7.6*   HCT 24.4* 24.3* 23.7*   * 119* 145*     CMP:   Recent Labs  Lab 03/26/18  0404  03/26/18  2207 03/27/18  0311 03/27/18  1502     141  < > 141  141 142  142  142 143   K 4.8  4.8  < > 4.5  4.5 4.7  4.7  4.7 4.3     107  < > 107  107 108  108  108 106   CO2 21*  21*  < > 23  23 24  24  24 27   *  215*  < > 157*  157* 171*  171*  171* 229*   BUN 32*  32*  < > 12  12 10  10  10 13   CREATININE 2.1*  2.1*  < > 1.0  1.0 0.8  0.8  0.8 0.8   CALCIUM 8.1*  8.1*  < > 8.0*  8.0* 8.1*  8.1*  8.1* 8.2*   PROT 6.0  --   --  6.0  --    ALBUMIN 2.4*  2.4*  < > 2.1*  2.1* 2.2*  2.2*  2.2* 2.1*   BILITOT 1.2*  --   --  1.1*  --    ALKPHOS 204*  --   --  209*  --    AST 32  --   --  35  --    ALT 24  --   --  24  --    ANIONGAP 13  13  < > 11  11 10  10  10 10   EGFRNONAA 37.2*  37.2*  < > >60.0  >60.0 >60.0  >60.0  >60.0 >60.0   < > = values in this interval not displayed.  Microbiology Results (last 7 days)     Procedure Component Value Units Date/Time    Culture, Respiratory with Gram Stain [[id number]] Collected:  03/25/18 1534    Order Status:  Completed Specimen:  Respiratory from Sputum, Induced Updated:  03/27/18 0712     Respiratory Culture Normal respiratory hank     Gram Stain (Respiratory) <10 epithelial cells per low power field.     Gram Stain (Respiratory) Rare WBC's     Gram Stain (Respiratory) Rare Gram positive cocci    Blood culture [[id number]] Collected:  03/25/18 1606    Order Status:  Completed Specimen:  Blood from Peripheral, Forearm, Left Updated:  03/26/18 1812     Blood Culture, Routine No Growth to date     Blood Culture, Routine No Growth to date    Blood culture [[id number]] Collected:  03/25/18 1554    Order Status:  Completed Specimen:  Blood from  Peripheral, Upper Arm, Right Updated:  03/26/18 1812     Blood Culture, Routine No Growth to date     Blood Culture, Routine No Growth to date    Blood culture [[id number]] Collected:  03/21/18 0815    Order Status:  Completed Specimen:  Blood from Peripheral, Forearm, Left Updated:  03/26/18 1012     Blood Culture, Routine No growth after 5 days.    Urine culture [[id number]]     Order Status:  Canceled Specimen:  Urine     Blood culture [[id number]] Collected:  03/18/18 0748    Order Status:  Completed Specimen:  Blood from Peripheral, Hand, Right Updated:  03/23/18 1012     Blood Culture, Routine No growth after 5 days.    Blood culture [[id number]] Collected:  03/18/18 0618    Order Status:  Completed Specimen:  Blood from Peripheral, Hand, Left Updated:  03/21/18 1108     Blood Culture, Routine Gram stain aer bottle: Gram positive cocci in clusters resembling Staph      Blood Culture, Routine Results called to and read back by:Omid Cody RN 03/19/2018  10:55     Blood Culture, Routine --     COAGULASE-NEGATIVE STAPHYLOCOCCUS SPECIES  Organism is a probable contaminant            Significant Imaging: I have reviewed all pertinent imaging results/findings within the past 24 hours.

## 2018-03-27 NOTE — ASSESSMENT & PLAN NOTE
- TTE 3/26/18 showed normal graft function  - Had -ve DSE on 11/30/17  - As he was on Pred 2.5 mg qd at home and he became hypotensive over the weekend (Hydrocortisone was completed 3/23),  resumed at 50 mg IV every 8 hours 3/26/18  - Tacro level 9.1 after holding Tac last night (goal 6-9). Start 1 mg bid today  - Cellcept remains on hold

## 2018-03-27 NOTE — SUBJECTIVE & OBJECTIVE
Interval History: NAEON. Remains on 40% FiO2 and 5 PEEP. Tolerating tube feeds via NG. BM x2.     Medications:  Continuous Infusions:   sodium chloride 0.9%      dexmedetomidine (PRECEDEX) infusion Stopped (03/21/18 0750)    insulin (HUMAN R) infusion (adults)      norepinephrine bitartrate-D5W Stopped (03/26/18 1320)     Scheduled Meds:   chlorhexidine  15 mL Mouth/Throat BID    docusate  100 mg Per NG tube BID    escitalopram oxalate  20 mg Oral Daily    famotidine (PF)  20 mg Intravenous BID    heparin (porcine)  5,000 Units Subcutaneous Q12H    hydrocortisone sodium succinate  50 mg Intravenous Q8H    levothyroxine  125 mcg Per NG tube Before breakfast    linezolid 600mg/300ml  600 mg Intravenous Q12H    meropenem (MERREM) IVPB  2 g Intravenous Q12H    metoclopramide HCl  5 mg Intravenous Q6H    polyethylene glycol  17 g Per NG tube Daily    posaconazole 200 mg/5ml (40 mg/ml)  200 mg Per NG tube TID WM    sodium chloride 0.9%  3 mL Intravenous Q8H    tacrolimus  1 mg Oral BID     PRN Meds:acetaminophen, dextrose 50%, dextrose 50%, glucagon (human recombinant), glucose, glucose, insulin aspart U-100, k phos di & mono-sod phos mono     Review of patient's allergies indicates:   Allergen Reactions    No known drug allergies      Objective:     Vital Signs (Most Recent):  Temp: 97.7 °F (36.5 °C) (03/27/18 0700)  Pulse: (!) 118 (03/27/18 0900)  Resp: (!) 24 (03/27/18 0900)  BP: 120/64 (03/27/18 0800)  SpO2: 100 % (03/27/18 0900) Vital Signs (24h Range):  Temp:  [97.7 °F (36.5 °C)-98.8 °F (37.1 °C)] 97.7 °F (36.5 °C)  Pulse:  [117-129] 118  Resp:  [0-35] 24  SpO2:  [100 %] 100 %  BP: ()/(53-67) 120/64  Arterial Line BP: ()/(43-81) 129/70     Intake/Output - Last 3 Shifts       03/25 0700 - 03/26 0659 03/26 0700 - 03/27 0659 03/27 0700 - 03/28 0659    I.V. (mL/kg) 522.8 (8.1) 3200.3 (42.8) 407.6 (5.5)    Blood 401      NG/GT 1320 1500 50    IV Piggyback 700 900     Total Intake(mL/kg)  2943.8 (45.5) 5600.3 (75) 457.6 (6.1)    Other 353 4501 909    Stool  1     Total Output 353 4502 909    Net +2590.8 +1098.3 -451.4           Stool Occurrence  1 x           SpO2: 100 %  O2 Device (Oxygen Therapy): ventilator    Physical Exam   Constitutional: He is intubated.   HENT:   Head: Normocephalic and atraumatic.   Neck: Normal range of motion. No tracheal deviation present. No thyromegaly present.   Cardiovascular: Regular rhythm and intact distal pulses.  Tachycardia present.    No murmur heard.  Pulmonary/Chest: He is intubated. He has decreased breath sounds in the right lower field and the left lower field.   Abdominal: Soft. Bowel sounds are normal. He exhibits no distension. There is no tenderness.   Musculoskeletal: He exhibits no edema.   Lymphadenopathy:     He has no cervical adenopathy.   Neurological: He is alert.       Significant Labs:  ABGs:   Recent Labs  Lab 03/27/18  0652   PH 7.424   PCO2 42.3   PO2 31*   HCO3 27.7   POCSATURATED 61*   BE 3     BMP:   Recent Labs  Lab 03/27/18  0311 03/27/18  0801   *  171*  171*  --      142  142  --    K 4.7  4.7  4.7  --      108  108  --    CO2 24  24  24  --    BUN 10  10  10  --    CREATININE 0.8  0.8  0.8  --    CALCIUM 8.1*  8.1*  8.1*  --    MG  --  1.9     CBC:   Recent Labs  Lab 03/27/18  0801   WBC 3.33*   RBC 2.55*   HGB 7.6*   HCT 23.7*   *   MCV 93   MCH 29.8   MCHC 32.1     CMP:   Recent Labs  Lab 03/27/18  0311   *  171*  171*   CALCIUM 8.1*  8.1*  8.1*   ALBUMIN 2.2*  2.2*  2.2*   PROT 6.0     142  142   K 4.7  4.7  4.7   CO2 24  24  24     108  108   BUN 10  10  10   CREATININE 0.8  0.8  0.8   ALKPHOS 209*   ALT 24   AST 35   BILITOT 1.1*     Coagulation:   Recent Labs  Lab 03/27/18  0801   INR 1.0       Significant Diagnostics:  CT: I have reviewed all pertinent results/findings within the past 24 hours  CXR: I have reviewed all pertinent results/findings  within the past 24 hours    VTE Risk Mitigation         Ordered     heparin (porcine) injection 5,000 Units  Every 12 hours     Route:  Subcutaneous        03/23/18 5289

## 2018-03-27 NOTE — ASSESSMENT & PLAN NOTE
- resolved at the moment  - secondary to sepsis on admission  - DIC labs including FSP, platelets negative for ongoing status  - cryoglobulin and FFP to be given if needed to keep fibrinogen above 100 and INR below 1.8  - [place] level at 145 today, INR 1.0  - Continue to check fibrinogen, INR and Plts level q 12 hours.

## 2018-03-27 NOTE — PROGRESS NOTES
"  [facility]  Adult Nutrition  Consult Note    SUMMARY     Recommendations    1. Continue current TF regimen of Glucerna 1.5 @ 50 mL/hr.    - Hold for residuals >500 mL; additional fluid per MD.   2. RD to monitor & follow-up.    Goals: Meet % EEN, EPN  Nutrition Goal Status: goal met  Communication of RD Recs: reviewed with RN    Reason for Assessment    Reason for Assessment: RD follow-up  Diagnosis: other (see comments) (Resp. fx)  Relevant Medical History: Hashimoto's disease, HTN, HLD, DM, CHF, Heart tx (2014)  Interdisciplinary Rounds: did not attend    General Information Comments: Pt remains intubated, sedated w/ no family at bedside. Receiving CRRT. Tolerating current TF regimen.  Nutrition Discharge Planning: Unable to determine    Nutrition/Diet History    Patient Reported Diet/Restrictions/Preferences: other (see comments) (HAO)  Do you have any cultural, spiritual, Lutheran conflicts, given your current situation?: none reported   Factors Affecting Nutritional Intake: NPO, on mechanical ventilation    Anthropometrics    Temp: 97.7 °F (36.5 °C)  Height Method: Stated  Height: 5' 7" (170.2 cm)  Height (inches): 67 in  Weight Method: Bed Scale  Weight: 74.7 kg (164 lb 10.9 oz)  Weight (lb): 164.69 lb  Ideal Body Weight (IBW), Male: 148 lb  % Ideal Body Weight, Male (lb): 111.28 lb  BMI (Calculated): 25.8  BMI Grade: 25 - 29.9 - overweight  Usual Body Weight (UBW), kg:  (HAO)    Lab/Procedures/Meds    Pertinent Labs Reviewed: reviewed  Pertinent Labs Comments: Gluc 171, A1C 6.9  Pertinent Medications Reviewed: reviewed  Pertinent Medications Comments: Insulin, IVF    Physical Findings/Assessment    Overall Physical Appearance: nourished, on ventilator support  Tubes: orogastric tube  Oral/Mouth Cavity: WDL  Skin: intact    Estimated/Assessed Needs    Weight Used For Calorie Calculations: 81 kg (178 lb 9.2 oz)  Height: 5' 7" (170.2 cm)    Energy Calorie Requirements (kcal): 1891 " kcal/d  Energy Need Method: [facility]    Protein Requirements:  g/d (1.2-1.5 g/kg)  Weight Used For Protein Calculations: 81 kg (178 lb 9.2 oz)    Fluid Need Method: other (see comments) (Per MD or 1 mL/kcal)    CHO Requirement: 50% total kcals     Nutrition Prescription Ordered    Current Diet Order: NPO    Current Nutrition Support Formula Ordered: Glucerna 1.5  Current Nutrition Support Rate Ordered: 50 (ml)  Current Nutrition Support Frequency Ordered: mL/hr    Evaluation of Received Nutrient/Fluid Intake    Enteral Calories (kcal): 1800  Enteral Protein (gm): 99  Enteral (Free Water) Fluid (mL): 911    % Kcal Needs: 95%  % Protein Needs: 100%    IV Fluid (mL): 120    Energy Calories Required: meeting needs  Protein Required: meeting needs  Fluid Required: other (see comments) (Per MD or 1 mL/kcal)    Tolerance: tolerating    Nutrition Risk    Level of Risk/Frequency of Follow-up: moderate     Assessment and Plan    * Acute respiratory failure with hypoxia      Nutrition Problem  Inadequate energy intake    Related to (etiology):   Inability to consume sufficient energy    Signs and Symptoms (as evidenced by):   NPO with no alternate means of nutrition     Nutrition Diagnosis Status:   Resolved         Monitor and Evaluation    Food and Nutrient Intake: energy intake, food and beverage intake, enteral nutrition intake  Food and Nutrient Adminstration: diet order, enteral and parenteral nutrition administration  Physical Activity and Function: nutrition-related ADLs and IADLs  Anthropometric Measurements: weight, weight change  Biochemical Data, Medical Tests and Procedures: lipid profile, inflammatory profile, glucose/endocrine profile, gastrointestinal profile, electrolyte and renal panel  Nutrition-Focused Physical Findings: overall appearance     Nutrition Follow-Up    RD Follow-up?: Yes

## 2018-03-27 NOTE — ASSESSMENT & PLAN NOTE
42 y.o. male presents with PMH of OHTX in November 2014, Hashimoto's thyroiditis, T1DM, CKD and history of recurrent acsites and pleural effusions now admitted for respiratory failure from severe RSV.    - Follow up on abdominal US and chest CT from today.   - Will plan for Trach/PEG in OR on Wednesday. However, PEG may be canceled if significant ascites is present. If so, consider IR NJ placement.  - NPO Tuesday at midnight. Please hold am heparin on Wednesday.

## 2018-03-27 NOTE — PROGRESS NOTES
[facility]  Nephrology  Progress Note    Patient Name: Lv Crocker  MRN: [id number]  Admission Date: 3/11/2018  Hospital Length of Stay: 15 days  Attending Provider: Jose A Lloyd MD   Primary Care Physician: Philippe Mohr MD  Principal Problem:Acute respiratory failure with hypoxia    Subjective:     HPI: Mr. Crocker is a 41 yo WM with T1DM, Hashimoto thyroiditis, h/o OHTx 11/2014, and CKD stage 4 who was admitted on 3/11/18 for persistent diarrhea. He reported a 3 week history of diarrhea up to 15x/day. Associated symptoms including URI symptoms, coughing fits, and coughing syncope. Prograf level was 14.3. His post-heart transplant course has been complicated by AMR 4/2015, CMV, post-transplant restrictive cardiomyopathy, recurrent pleural effusions/ascites requiring monthly thoracenteses/paracenteses, VATS 1/11/18 with Pleur-X catheter (now removed), and CKD stage 4 with baseline sCr 2.6-3.0. Baseline -120s and baseline BP 90s-110s/60-70s. Upon admission he was started on IVF and diarrhea workup was initiated. On 3/12 he was noted to have decreased UOP despite fluids; NS was increased to 100cc/hr. He was scheduled for a colonoscopy on 3/13 however procedure was cancelled due to hypoxia and fever. He was transferred to the ICU for closer monitoring. He continued to have oliguria overnight. Bladder scan revealed 200cc of urine but patient refused osullivan catheter placement. Wife reports that patient always has a hard time urinating again after osullivan catheters are placed/removed so he refuses them. He remained hypoxic despite FiO2 70% and ABG revealed combined respiratory and metabolic acidosis with pH 7.17. He was started on BiPAP as well as a bicarb infusion at 50cc/hr. ABG with mild improvement. AM labs revealed K 6.2 and he was shifted and given kayexalate.Trialysis catheter was placed this morning and he subsequently developed hypotension and was started on levophed. He was  intubated later this morning. Nephrology consulted for CACHORRO. All history obtained by primary team and chart review as patient was intubated/sedated on exam. Consent for dialysis obtained by patient's wife and placed in chart. Of note, both of patient's parents were on dialysis.     Interval History:   No events overnight. Trach/PEG planned for Wednesday pending status of ascites. Net +1L yesterday despite SLED with UF 300cc/hr. Awake and alert this morning. Minimal vent requirements. Hourly intake 55cc/hr with tube feeds and insulin. Afebrile. MAP in the 90s.     Review of patient's allergies indicates:   Allergen Reactions    No known drug allergies      Current Facility-Administered Medications   Medication Frequency    0.9%  NaCl infusion (CRRT USE ONLY) Continuous    acetaminophen oral solution 650 mg Q4H PRN    chlorhexidine 0.12 % solution 15 mL BID    dexmedetomidine (PRECEDEX) 400mcg/100mL 0.9% NaCL infusion Continuous    dextrose 50% injection 12.5 g PRN    dextrose 50% injection 25 g PRN    docusate 50 mg/5 mL liquid 100 mg BID    escitalopram oxalate tablet 20 mg Daily    famotidine (PF) injection 20 mg BID    glucagon (human recombinant) injection 1 mg PRN    glucose chewable tablet 16 g PRN    glucose chewable tablet 24 g PRN    heparin (porcine) injection 5,000 Units Q12H    hydrocortisone sodium succinate injection 50 mg Q8H    insulin aspart U-100 pen 0-5 Units PRN    insulin regular (Humulin R) 100 Units in sodium chloride 0.9% 100 mL infusion Continuous    k phos di & mono-sod phos mono 250 mg tablet 2 tablet Q4H PRN    levothyroxine tablet 125 mcg Before breakfast    linezolid 600mg/300ml IVPB 600 mg Q12H    meropenem (MERREM) 2 g in sodium chloride 0.9% 100 mL IVPB Q12H    metoclopramide HCl injection 5 mg Q6H    norepinephrine 32 mg in sodium chloride 0.9% 250 mL infusion Continuous    polyethylene glycol packet 17 g Daily    posaconazole 200 mg/5ml (40 mg/ml) suspension  200 mg TID WM    sodium chloride 0.9% flush 3 mL Q8H    tacrolimus capsule 1 mg BID       Objective:     Vital Signs (Most Recent):  Temp: 97.7 °F (36.5 °C) (03/27/18 0700)  Pulse: (!) 118 (03/27/18 0900)  Resp: (!) 24 (03/27/18 0900)  BP: 120/64 (03/27/18 0800)  SpO2: 100 % (03/27/18 0900)  O2 Device (Oxygen Therapy): ventilator (03/27/18 0900) Vital Signs (24h Range):  Temp:  [97.7 °F (36.5 °C)-98.8 °F (37.1 °C)] 97.7 °F (36.5 °C)  Pulse:  [117-129] 118  Resp:  [0-35] 24  SpO2:  [100 %] 100 %  BP: ()/(53-67) 120/64  Arterial Line BP: ()/(43-81) 129/70     Weight: 74.7 kg (164 lb 10.9 oz) (03/27/18 0609)  Body mass index is 25.79 kg/m².  Body surface area is 1.88 meters squared.    I/O last 3 completed shifts:  In: 6994.7 [I.V.:3493.7; Blood:401; NG/GT:2100; IV Piggyback:1000]  Out: 4791 [Other:4790; Stool:1]    Physical Exam   Constitutional: He appears well-developed and well-nourished.   HENT:   Head: Normocephalic and atraumatic.   Eyes: Conjunctivae and EOM are normal.   Cardiovascular: Regular rhythm.  Tachycardia present.    Pulmonary/Chest: He has no wheezes. He has no rales.   Abdominal: Soft. He exhibits no distension.   Musculoskeletal: He exhibits edema. He exhibits no deformity.   Skin: Skin is warm and dry. He is not diaphoretic.       Significant Labs:  ABGs:   Recent Labs  Lab 03/27/18  0652   PH 7.424   PCO2 42.3   HCO3 27.7   POCSATURATED 61*   BE 3     CBC:   Recent Labs  Lab 03/27/18  0801   WBC 3.33*   RBC 2.55*   HGB 7.6*   HCT 23.7*   *   MCV 93   MCH 29.8   MCHC 32.1     CMP:   Recent Labs  Lab 03/27/18  0311   *  171*  171*   CALCIUM 8.1*  8.1*  8.1*   ALBUMIN 2.2*  2.2*  2.2*   PROT 6.0     142  142   K 4.7  4.7  4.7   CO2 24  24  24     108  108   BUN 10  10  10   CREATININE 0.8  0.8  0.8   ALKPHOS 209*   ALT 24   AST 35   BILITOT 1.1*     All labs within the past 24 hours have been reviewed.     Significant Imaging:  Labs:  Reviewed  X-Ray: Reviewed    Assessment/Plan:     Acute renal failure superimposed on stage 4 chronic kidney disease    - baseline sCr 2.6-3.0 consistent with CKD stage IV  - anuric CACHORRO; likely ischemic ATN from prolonged pre-renal state (diarrhea) in setting of prograf renal vasoconstriction; cannot r/o septic ATN or Prograf toxicity  - SLED started 3/14  - remains anuric  - SLED held on Sunday for hypotension; restarted yesterday with mild UF. Tolerated well. MAP in the 90s this morning. Net +1L yesterday.   - continue SLED today. Will increase UF goal to help obtain euvolemia.             Amairani Alegria, PGY-5  Nephrology Fellow  [facility]  Pager: [phone]

## 2018-03-27 NOTE — ASSESSMENT & PLAN NOTE
- Require resumation of Levo over weekend 2/2 hypotension (2/2 sepsis versus stopping steroids?), but is now off

## 2018-03-27 NOTE — SUBJECTIVE & OBJECTIVE
Interval History: Remains very weak. Required assist of 5 people to sit briefly on edge of bed yesterday.    Continuous Infusions:   sodium chloride 0.9% 200 mL/hr at 03/27/18 0800    dexmedetomidine (PRECEDEX) infusion Stopped (03/21/18 0750)    insulin (HUMAN R) infusion (adults) 3.8 Units/hr (03/27/18 0800)    norepinephrine bitartrate-D5W Stopped (03/26/18 1320)     Scheduled Meds:   chlorhexidine  15 mL Mouth/Throat BID    docusate  100 mg Per NG tube BID    escitalopram oxalate  20 mg Oral Daily    famotidine (PF)  20 mg Intravenous BID    heparin (porcine)  5,000 Units Subcutaneous Q12H    hydrocortisone sodium succinate  50 mg Intravenous Q8H    levothyroxine  125 mcg Per NG tube Before breakfast    linezolid 600mg/300ml  600 mg Intravenous Q12H    meropenem (MERREM) IVPB  2 g Intravenous Q12H    metoclopramide HCl  5 mg Intravenous Q6H    polyethylene glycol  17 g Per NG tube Daily    posaconazole 200 mg/5ml (40 mg/ml)  200 mg Per NG tube TID WM    sodium chloride 0.9%  3 mL Intravenous Q8H    tacrolimus  1 mg Oral BID     PRN Meds:acetaminophen, dextrose 50%, dextrose 50%, glucagon (human recombinant), glucose, glucose, k phos di & mono-sod phos mono    Review of patient's allergies indicates:   Allergen Reactions    No known drug allergies      Objective:     Vital Signs (Most Recent):  Temp: 97.7 °F (36.5 °C) (03/27/18 0700)  Pulse: (!) 119 (03/27/18 0800)  Resp: (!) 25 (03/27/18 0800)  BP: 120/64 (03/27/18 0800)  SpO2: 100 % (03/27/18 0800) Vital Signs (24h Range):  Temp:  [97.7 °F (36.5 °C)-98.8 °F (37.1 °C)] 97.7 °F (36.5 °C)  Pulse:  [117-129] 119  Resp:  [0-35] 25  SpO2:  [100 %] 100 %  BP: ()/(53-67) 120/64  Arterial Line BP: ()/(43-81) 129/70     Patient Vitals for the past 72 hrs (Last 3 readings):   Weight   03/27/18 0609 74.7 kg (164 lb 10.9 oz)   03/25/18 0155 64.7 kg (142 lb 10.2 oz)     Body mass index is 25.79 kg/m².      Intake/Output Summary (Last 24 hours)  at 03/27/18 0858  Last data filed at 03/27/18 0800   Gross per 24 hour   Intake          5700.89 ml   Output             5022 ml   Net           678.89 ml       Hemodynamic Parameters:       Telemetry: ST    Physical Exam   Constitutional: He appears well-developed and well-nourished.   Appears ill   HENT:   Head: Normocephalic and atraumatic.   Eyes: Conjunctivae are normal. Pupils are equal, round, and reactive to light.   Neck: No thyromegaly present.   RIJ trialysis   Cardiovascular: Regular rhythm.    Tachycardic   Pulmonary/Chest:   Intubated and ventilated  Breath sounds are slightly decreased right base. Essentially CTA bilaterally   Abdominal: Soft.   Hypoactive bowel sounds. + ascites   Musculoskeletal:   Trace gen edema   Neurological:   Intubated. Arouses to voice and nods head appropriately   Skin: Skin is warm and dry. Capillary refill takes less than 2 seconds. There is pallor.       Significant Labs:  CBC:    Recent Labs  Lab 03/26/18  1617 03/26/18  2017 03/27/18  0801   WBC 2.21* 2.91* 3.33*   RBC 2.66* 2.61* 2.55*   HGB 7.9* 7.9* 7.6*   HCT 24.4* 24.3* 23.7*   * 119* 145*   MCV 92 93 93   MCH 29.7 30.3 29.8   MCHC 32.4 32.5 32.1     BNP:  No results for input(s): BNP in the last 168 hours.    Invalid input(s): BNPTRIAGELBLO  CMP:    Recent Labs  Lab 03/25/18  0400  03/26/18  0404 03/26/18  1617 03/26/18  2207 03/27/18  0311   *  178*  178*  178*  < > 215*  215* 172* 157*  157* 171*  171*  171*   CALCIUM 8.3*  8.3*  8.3*  8.3*  < > 8.1*  8.1* 8.1* 8.0*  8.0* 8.1*  8.1*  8.1*   ALBUMIN 2.8*  2.8*  2.8*  2.8*  < > 2.4*  2.4* 2.2* 2.1*  2.1* 2.2*  2.2*  2.2*   PROT 6.6  --  6.0  --   --  6.0     140  140  140  < > 141  141 142 141  141 142  142  142   K 4.5  4.5  4.5  4.5  < > 4.8  4.8 5.2* 4.5  4.5 4.7  4.7  4.7   CO2 19*  19*  19*  19*  < > 21*  21* 21* 23  23 24  24  24     108  108  108  < > 107  107 108 107  107 108  108   108   BUN 6  6  6  6  < > 32*  32* 28* 12  12 10  10  10   CREATININE 0.5  0.5  0.5  0.5  < > 2.1*  2.1* 1.9* 1.0  1.0 0.8  0.8  0.8   ALKPHOS 214*  --  204*  --   --  209*   ALT 27  --  24  --   --  24   AST 37  --  32  --   --  35   BILITOT 1.6*  --  1.2*  --   --  1.1*   < > = values in this interval not displayed.   Coagulation:     Recent Labs  Lab 03/23/18  1550  03/26/18  1659 03/26/18  2017 03/27/18  0801   INR 1.4*  < > 1.0 1.0 1.0   APTT 25.5  --   --   --   --    < > = values in this interval not displayed.  LDH:  No results for input(s): LDH in the last 72 hours.  Microbiology:  Microbiology Results (last 7 days)     Procedure Component Value Units Date/Time    Culture, Respiratory with Gram Stain [[id number]] Collected:  03/25/18 1534    Order Status:  Completed Specimen:  Respiratory from Sputum, Induced Updated:  03/27/18 0739     Respiratory Culture Normal respiratory hank     Gram Stain (Respiratory) <10 epithelial cells per low power field.     Gram Stain (Respiratory) Rare WBC's     Gram Stain (Respiratory) Rare Gram positive cocci    Blood culture [[id number]] Collected:  03/25/18 1606    Order Status:  Completed Specimen:  Blood from Peripheral, Forearm, Left Updated:  03/26/18 1812     Blood Culture, Routine No Growth to date     Blood Culture, Routine No Growth to date    Blood culture [[id number]] Collected:  03/25/18 1554    Order Status:  Completed Specimen:  Blood from Peripheral, Upper Arm, Right Updated:  03/26/18 1812     Blood Culture, Routine No Growth to date     Blood Culture, Routine No Growth to date    Blood culture [[id number]] Collected:  03/21/18 0815    Order Status:  Completed Specimen:  Blood from Peripheral, Forearm, Left Updated:  03/26/18 1012     Blood Culture, Routine No growth after 5 days.    Urine culture [[id number]]     Order Status:  Canceled Specimen:  Urine     Blood culture [[id number]] Collected:  03/18/18 0748    Order Status:  Completed Specimen:   Blood from Peripheral, Hand, Right Updated:  03/23/18 1012     Blood Culture, Routine No growth after 5 days.    Blood culture [[id number]] Collected:  03/18/18 0618    Order Status:  Completed Specimen:  Blood from Peripheral, Hand, Left Updated:  03/21/18 1108     Blood Culture, Routine Gram stain aer bottle: Gram positive cocci in clusters resembling Staph      Blood Culture, Routine Results called to and read back by:Omid Cody RN 03/19/2018  10:55     Blood Culture, Routine --     COAGULASE-NEGATIVE STAPHYLOCOCCUS SPECIES  Organism is a probable contaminant            I have reviewed all pertinent labs within the past 24 hours.    Estimated Creatinine Clearance: 110.2 mL/min (based on SCr of 0.8 mg/dL).    Diagnostic Results:  I have reviewed all pertinent imaging results/findings within the past 24 hours.

## 2018-03-27 NOTE — ANESTHESIA PREPROCEDURE EVALUATION
[facility]  Anesthesia Pre-Operative Evaluation         Patient Name: Lv Crocker  YOB: 1975  MRN: [id number]    SUBJECTIVE:     Pre-operative evaluation for Procedure(s) (LRB):  PLACEMENT-TUBE-PEG (N/A)  TRACHEOSTOMY (N/A)     03/27/2018    Lv Crocker is a 42 y.o. male w/ a significant PMHx of CHF s/p OHTX 2/2 cardiomyopathy in November 2014, Hashimoto's thyroiditis, T1DM, CKD, CAD (of native heart), HLD, depression, GERD, HTN, and history of recurrent acsites and pleural effusions admitted for respiratory failure from severe RSV. He was Intubated on 3/14/18 for hypoxemic and hypercapnic resp failure. Chest CT at that time showed small right pleural effusion and bilateral subsegmental multifocal consolidation with air bronchograms more extensive in left lung. Since then he has had a complicated hospital including acute kidney failure (currently on CRRT), intermittent pressor requirement, DIC, and several failed SBTs. He is currently intubated and on a precedex infusion for sedation. He is off of pressor support.     Patient now presents for the above procedure(s).      LDA:        Trialysis (Dialysis) Catheter 03/23/18 1433 right internal jugular (Active)   IV Device Securement sutures 3/27/2018  7:01 AM   Additional Site Signs no erythema;no warmth;no edema;no pain;no palpable cord;no streak formation;no drainage 3/27/2018  7:01 AM   Patency/Care infusing 3/27/2018  7:01 AM   Site Assessment Clean;Dry;Intact;No redness;No swelling 3/27/2018  7:01 AM   Status Accessed 3/27/2018  7:01 AM   Flows Good 3/27/2018  7:01 AM   Dressing Intervention New dressing 3/27/2018  7:01 AM   Dressing Status Biopatch in place;Clean;Dry;Intact 3/27/2018  7:01 AM   Dressing Change Due 04/01/18 3/27/2018  7:01 AM   Verification by X-ray Yes 3/26/2018  7:01 PM   Site Condition No complications 3/27/2018  7:01 AM   Dressing Occlusive 3/27/2018  7:01 AM   Daily Line Review Performed  3/27/2018  7:01 AM   Number of days: 3            Peripheral IV - Single Lumen 03/20/18 1640 Right Forearm (Active)   Site Assessment Clean;Dry;Intact;No redness;No swelling 3/27/2018  7:01 AM   Line Status Infusing 3/27/2018  7:01 AM   Dressing Status Clean;Dry;Intact 3/27/2018  7:01 AM   Dressing Change Due 03/24/18 3/27/2018  3:00 AM   Site Change Due 03/24/18 3/26/2018  7:01 AM   Reason Not Rotated Poor venous access 3/27/2018  7:01 AM   Number of days: 6            Midline Catheter Insertion/Assessment  - Single Lumen 03/17/18 1454 Left cephalic vein (lateral side of arm) 18g x 8cm (Active)   Site Assessment Clean;Dry;Intact;No redness;No swelling 3/27/2018  7:01 AM   IV Device Securement sutures 3/27/2018  7:01 AM   Line Status Saline locked;Flushed 3/27/2018  7:01 AM   Dressing Status Clean;Dry;Intact 3/27/2018  7:01 AM   Dressing Intervention New dressing 3/17/2018  3:00 PM   Dressing Change Due 03/29/18 3/27/2018  7:01 AM   Site Change Due 04/15/18 3/26/2018  7:01 PM   Reason Not Rotated Not due 3/27/2018  3:00 AM   Number of days: 9            Arterial Line 03/14/18 1600 Right Femoral (Active)   Site Assessment Clean;Dry;Intact;No redness;No swelling 3/27/2018  7:01 AM   Line Status Pulsatile blood flow 3/27/2018  7:01 AM   Art Line Waveform Appropriate 3/27/2018  7:01 AM   Arterial Line Interventions Zeroed and calibrated;Connections checked and tightened;Flushed per protocol;Leveled 3/27/2018  7:01 AM   Color/Movement/Sensation Capillary refill less than 3 sec 3/27/2018  7:01 AM   Dressing Type Transparent 3/27/2018  7:01 AM   Dressing Status Biopatch in place;Clean;Dry;Intact 3/27/2018  7:01 AM   Dressing Intervention New dressing 3/26/2018  3:00 AM   Dressing Change Due 03/30/18 3/27/2018  7:01 AM   Number of days: 12            NG/OG Tube 03/14/18 1300 [place] mouth (Active)   Placement Check placement verified by x-ray 3/27/2018  7:01 AM   Tube advanced (cm) 60 3/26/2018  7:01 AM   Distal Tube  Length (cm) 60 3/27/2018  7:01 AM   Tolerance no signs/symptoms of discomfort 3/27/2018  7:01 AM   Securement taped to commercial device 3/27/2018  7:01 AM   Clamp Status/Tolerance no abdominal discomfort;no abdominal distention;no emesis;no nausea;no residual;no restlessness 3/27/2018  7:01 AM   Suction Setting/Drainage Method suction at;low;intermittent setting 3/19/2018  7:15 PM   Insertion Site Appearance no redness, warmth, tenderness, skin breakdown, drainage 3/27/2018  7:01 AM   Drainage None 3/27/2018  3:00 AM   Flush/Irrigation flushed w/;water;no resistance met 3/27/2018  7:01 AM   Feeding Method continuous 3/27/2018  7:01 AM   Current Rate (mL/hr) 50 mL/hr 3/26/2018  7:01 PM   Goal Rate (mL/hr) 50 mL/hr 3/26/2018  7:01 PM   Intake (mL) 60 mL 3/27/2018  6:00 AM   Water Bolus (mL) 60 mL 3/25/2018  9:00 AM   Tube Output(mL)(Include Discarded Residual) 0 mL 3/22/2018  7:00 PM   Rate Formula Tube Feeding (mL/hr) 50 mL/hr 3/26/2018  7:01 PM   Intake (mL) - Formula Tube Feeding 50 3/27/2018  7:00 AM   Residual Amount (ml) 5 ml 3/26/2018  8:45 AM   Number of days: 12       Prev airway:     Present Prior to Hospital Arrival?: No; Placement Date: 01/11/18; Placement Time: 0738; Method of Intubation: Direct laryngoscopy; Inserted by: CRNA; Airway Device: Endotracheal Tube (double lumen); Mask Ventilation: Easy; Intubated: Postinduction; Blade: Johnson #2; Airway Device Size:  (Left 39F); Style: Cuffed; Cuff Inflation: Minimal occlusive pressure; Inflation Amount:  (0.5 bronchial 2 tracheal); Placement Verified By: Auscultation, Capnometry, ETT Condensation; Grade: Grade I; Complicating Factors: Anterior larynx, Small mouth, Obesity, Short neck; Intubation Findings: Positive EtCO2, Bilateral breath sounds, Atraumatic/Condition of teeth unchanged;  Depth of Insertion: 27; Securment: Lips; Complications: None; Breath Sounds: Equal Bilateral; Insertion Attempts: 1; Removal Date: 01/11/18;  Removal Time: 0845    Drips:     sodium chloride 0.9%      dexmedetomidine (PRECEDEX) infusion Stopped (03/21/18 0750)    insulin (HUMAN R) infusion (adults)      norepinephrine bitartrate-D5W Stopped (03/26/18 1320)       Patient Active Problem List   Diagnosis    Current use of steroid medication    Need for prophylactic immunotherapy    Tricuspid valve disorders, specified as nonrheumatic    Anxiety    PVD (peripheral vascular disease)    GERD (gastroesophageal reflux disease)    Debility    Sciatica    Disorder of magnesium metabolism    Subacute effusive constrictive pericarditis    Fever    Heart transplanted    Anemia    Acute renal failure superimposed on stage 4 chronic kidney disease    History of restrictive cardiomyopathy    CMV infection, acute    Hypothyroidism due to Hashimoto's thyroiditis    Thoracic compression fracture    Type 1 diabetes mellitus with hyperglycemia    Anemia of chronic renal failure, stage 3 (moderate)    Transplant follow-up    Type 1 diabetes mellitus with stage 3 chronic kidney disease    Hyperlipidemia LDL goal <70    Iron deficiency anemia    Other ascites    Osteopenia    Fatigue    Positive urine culture    Pleural effusion    Restrictive cardiomyopathy    Potential impairment of skin integrity    Hypoxia    Pneumonia    Acute respiratory failure with hypoxia    CKD (chronic kidney disease), stage IV    Shock, unspecified    On enteral nutrition    Ileus    Pancytopenia    Thrombocytopenia    DIC (disseminated intravascular coagulation)    Hypotension       Review of patient's allergies indicates:   Allergen Reactions    No known drug allergies        Current Inpatient Medications:   chlorhexidine  15 mL Mouth/Throat BID    docusate  100 mg Per NG tube BID    escitalopram oxalate  20 mg Oral Daily    famotidine (PF)  20 mg Intravenous BID    heparin (porcine)  5,000 Units Subcutaneous Q12H    hydrocortisone sodium succinate  50 mg Intravenous Q8H     levothyroxine  125 mcg Per NG tube Before breakfast    linezolid 600mg/300ml  600 mg Intravenous Q12H    meropenem (MERREM) IVPB  2 g Intravenous Q12H    metoclopramide HCl  5 mg Intravenous Q6H    polyethylene glycol  17 g Per NG tube Daily    posaconazole 200 mg/5ml (40 mg/ml)  200 mg Per NG tube TID WM    sodium chloride 0.9%  3 mL Intravenous Q8H    tacrolimus  1 mg Oral BID       No current facility-administered medications on file prior to encounter.      Current Outpatient Prescriptions on File Prior to Encounter   Medication Sig Dispense Refill    aspirin (ECOTRIN) 81 MG EC tablet Take 1 tablet (81 mg total) by mouth once daily. 30 tablet 11    escitalopram oxalate (LEXAPRO) 20 MG tablet Take 1 tablet (20 mg total) by mouth once daily. 30 tablet 11    gabapentin (NEURONTIN) 300 MG capsule Take 3 capsules (900 mg total) by mouth 3 (three) times daily. 90 capsule 1    insulin aspart (NOVOLOG) 100 unit/mL InPn pen Inject 4 Units into the skin 3 (three) times daily. 3.6 mL 11    insulin detemir (LEVEMIR FLEXTOUCH) 100 unit/mL (3 mL) SubQ InPn pen Inject 15 Units into the skin every evening. (Patient taking differently: Inject 15 Units into the skin 2 (two) times daily. ) 4.5 mL 11    lancets Misc 1 Device by Misc.(Non-Drug; Combo Route) route 4 (four) times daily with meals and nightly. 100 each 5    levothyroxine (SYNTHROID) 150 MCG tablet Take 1 tablet (150 mcg total) by mouth every morning. 30 tablet 12    magnesium oxide (MAG-OX) 400 mg tablet Take 1 tablet (400 mg total) by mouth once daily. 30 tablet 11    mycophenolate (MYFORTIC) 360 MG TbEC Take 2 tablets (720 mg total) by mouth 2 (two) times daily. 120 tablet 11    nortriptyline (PAMELOR) 25 MG capsule Take 1 capsule (25 mg total) by mouth every evening. 30 capsule 3    ondansetron (ZOFRAN-ODT) 4 MG TbDL Take 2 tablets (8 mg total) by mouth every 8 (eight) hours as needed (nausea). 15 tablet 0    pantoprazole (PROTONIX) 40 MG tablet  "TAKE ONE TABLET BY MOUTH EVERY DAY 30 tablet 11    pravastatin (PRAVACHOL) 40 MG tablet Take 1 tablet (40 mg total) by mouth every evening. (Patient taking differently: Take 40 mg by mouth once daily. ) 30 tablet 11    predniSONE (DELTASONE) 2.5 MG tablet Take 1 tablet (2.5 mg total) by mouth once daily. S/P Heart Transplant 11/18/2014 ICD-10 Z94.1 30 tablet 11    tacrolimus (PROGRAF) 1 MG Cap Taked 3mg orally in the morning and 3mg orally in the evening. S/p heart transplant  11/18/2014  ICD-10 Z94.1 60 capsule 0    tamsulosin (FLOMAX) 0.4 mg Cp24 TAKE 2 CAPSULES(0.8 MG) BY MOUTH EVERY EVENING 60 capsule 11    torsemide (DEMADEX) 20 MG Tab Take 2 tablets (40 mg total) by mouth once daily. 60 tablet 11    pen needle, diabetic 32 gauge x 5/32" Ndle Uses 5 pen needles a day 200 each 12       Past Surgical History:   Procedure Laterality Date    CARDIAC CATHETERIZATION      CARDIAC SURGERY      CHOLECYSTECTOMY      COLONOSCOPY N/A 3/13/2018    Procedure: COLONOSCOPY;  Surgeon: Tim Spencer MD;  Location: [facility] ENDO ([address] [place] [address]);  Service: Endoscopy;  Laterality: N/A;    CORONARY ANGIOPLASTY      FOOT SPLIT TRANSFER OF THE POSTERIOR TIBIALIS TENDON PROCEDURE      HEART TRANSPLANT  2014    KNEE SURGERY      PleuRx catheter placement  01/11/2018    Plan for removal after 1-2 months by Dr. James in cardiothoracic surgery    s/p oht  November 2014    stent placemnet         Social History     Social History    Marital status:      Spouse name: N/A    Number of children: N/A    Years of education: N/A     Occupational History    Not on file.     Social History Main Topics    Smoking status: Never Smoker    Smokeless tobacco: Never Used    Alcohol use No    Drug use: No    Sexual activity: Yes     Partners: Female     Other Topics Concern    Not on file     Social History Narrative            Breakfast: Skips 80%; cereal; Diet Sprite    Lunch: Turkey or chicken sandwich on white " bread; Diet Sprite    Dinner: Grilled burgers, small amount chips; Diet Sprite    Snacks: Ronny crackers before bed on most nights    Eats out: 2x/wk    Water: 0    PA: Walks 15 minutes (strenuous) daily    Goal weight 170-175 lbs by 1/2018       OBJECTIVE:     Vital Signs Range (Last 24H):  Temp:  [36.5 °C (97.7 °F)-37.1 °C (98.8 °F)]   Pulse:  [117-129]   Resp:  [0-35]   BP: ()/(53-67)   SpO2:  [100 %]   Arterial Line BP: ()/(43-81)       CBC:   Recent Labs      03/26/18 2017 03/27/18   0801   WBC  2.91*  3.33*   RBC  2.61*  2.55*   HGB  7.9*  7.6*   HCT  24.3*  23.7*   PLT  119*  145*   MCV  93  93   MCH  30.3  29.8   MCHC  32.5  32.1       CMP:   Recent Labs      03/26/18   0404   03/26/18   2207  03/27/18   0024  03/27/18   0311  03/27/18   0801   NA  141  141   < >  141  141   --   142  142  142   --    K  4.8  4.8   < >  4.5  4.5   --   4.7  4.7  4.7   --    CL  107  107   < >  107  107   --   108  108  108   --    CO2  21*  21*   < >  23  23   --   24  24  24   --    BUN  32*  32*   < >  12  12   --   10  10  10   --    CREATININE  2.1*  2.1*   < >  1.0  1.0   --   0.8  0.8  0.8   --    GLU  215*  215*   < >  157*  157*   --   171*  171*  171*   --    MG   --    < >   --   1.7  1.7   --   1.9   PHOS  3.6   < >  2.0*  2.0*   --   1.9*  1.9*   --    CALCIUM  8.1*  8.1*   < >  8.0*  8.0*   --   8.1*  8.1*  8.1*   --    ALBUMIN  2.4*  2.4*   < >  2.1*  2.1*   --   2.2*  2.2*  2.2*   --    PROT  6.0   --    --    --   6.0   --    ALKPHOS  204*   --    --    --   209*   --    ALT  24   --    --    --   24   --    AST  32   --    --    --   35   --    BILITOT  1.2*   --    --    --   1.1*   --     < > = values in this interval not displayed.       INR:  Recent Labs      03/26/18   1659  03/26/18 2017 03/27/18   0801   INR  1.0  1.0  1.0       Diagnostic Studies:     EKG:    3/14/18    Test Reason : D50.9  Blood Pressure : 086/053 mmHG  Vent. Rate : 124 BPM      Atrial Rate : 124 BPM     P-R Int : 144 ms          QRS Dur : 078 ms      QT Int : 322 ms       P-R-T Axes : 069 093 -86 degrees     QTc Int : 462 ms    Sinus tachycardia  Rightward axis  ST and T wave abnormality, consider inferolateral ischemia  Abnormal ECG  When compared with ECG of 11-MAR-2018 18:41,  T wave inversion less evident in Anterior leads  Confirmed by COURTNEY ABRAHAM, HOMEYAR ([id number]) on 3/15/2018 11:03:41 AM    2D ECHO:  Results for orders placed or performed during the hospital encounter of 03/11/18   2D echo with color flow doppler   Result Value Ref Range    EF 55 55 - 65    Diastolic Dysfunction No     Est. PA Systolic Pressure 25.81     Tricuspid Valve Regurgitation MILD          ASSESSMENT/PLAN:         Anesthesia Evaluation    I have reviewed the Patient Summary Reports.    I have reviewed the Nursing Notes.   I have reviewed the Medications.   Steroids Taken In Past Year: Prednisone    Review of Systems  Anesthesia Hx:  No problems with previous Anesthesia  History of prior surgery of interest to airway management or planning: Previous anesthesia: General  Procedure performed at an [facility]. Airway issues documented on chart review include mask, easy  Denies Family Hx of Anesthesia complications.   Denies Personal Hx of Anesthesia complications.   Hematology/Oncology:     Oncology Normal   Hematology Comments: Recent DIC   EENT/Dental:EENT/Dental Normal   Cardiovascular:   Hypertension Past MI CAD   CHF hyperlipidemia ECG has been reviewed. CHF s/p OHTX 2/2 cardiomyopathy in November 2014   Pulmonary:   Pleural effusions s/p VATs and pleurx  hypoxemic and hypercapnic resp failure likely 2/2 severe RSV infection  Failed several SBT   Renal/:   Chronic Renal Disease, ARF    Hepatic/GI:   GERD recurrent acsites   Musculoskeletal:  Musculoskeletal Normal    Neurological:   Neuromuscular Disease,    Endocrine:   Diabetes, type 1 Hypothyroidism Hashimoto's thyroiditis   Psych:   Psychiatric  History depression          Physical Exam  General:  Well nourished    Airway/Jaw/Neck:  Airway Findings: Mouth Opening: Normal Tongue: Normal  Pre-Existing Airway Tube(s): Oral Endotracheal tube  General Airway Assessment: Adult  Mallampati: II  Improves to II with phonation.  TM Distance: Normal, at least 6 cm  Jaw/Neck Findings:  Neck ROM: Normal ROM  Neck Findings: Normal     Dental:  Dental Findings: In tact, Periodontal disease, Mild   Chest/Lungs:  Chest/Lungs Findings: Normal Respiratory Rate     Heart/Vascular:  Heart Findings: Rate: Normal  Rhythm: Regular Rhythm  Sounds: Normal  Heart murmur: negative Vascular Findings: Normal    Abdomen:  Abdomen Findings: Normal    Musculoskeletal:  Musculoskeletal Findings: Normal   Skin:  Skin Findings: Normal    Mental Status:  Mental Status Findings:  Cooperative, Alert and Oriented         Anesthesia Plan  Type of Anesthesia, risks & benefits discussed:  Anesthesia Type:  general  Patient's Preference:   Intra-op Monitoring Plan: standard ASA monitors, arterial line and central line  Intra-op Monitoring Plan Comments:   Post Op Pain Control Plan: multimodal analgesia, IV/PO Opioids PRN and per primary service following discharge from PACU  Post Op Pain Control Plan Comments:   Induction:   IV  Beta Blocker:  Patient is not currently on a Beta-Blocker (No further documentation required).       Informed Consent: Patient understands risks and agrees with Anesthesia plan.  Questions answered. Anesthesia consent signed with patient.  ASA Score: 4     Day of Surgery Review of History & Physical:    H&P update referred to the surgeon.         Ready For Surgery From Anesthesia Perspective.

## 2018-03-27 NOTE — ASSESSMENT & PLAN NOTE
-Having bowel movements  -No residuals now. Continue tube feeds with goal 50 cc/hr  -Possible PEG tube placement on Wednesday (see above)

## 2018-03-27 NOTE — ASSESSMENT & PLAN NOTE
-S/P Bronch and intubation 3/14. Cultures ngtd.   -RSV positive. Completed course of Ribavarin/IVIG. Per lung transplant protocol for severe RSV(given myelosuppression).   -Aspergill Ag neg from BAL.   -Urine histo/blasto antigens neg, crypto antigen neg, urine legionella neg, and Quantiferon pending.  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 Continues to fail SBP - plan for trach on Wednesday Pulmonology managing vent

## 2018-03-27 NOTE — PROGRESS NOTES
[facility]  Heart Transplant  Progress Note    Patient Name: Lv Crocker  MRN: [id number]  Admission Date: 3/11/2018  Hospital Length of Stay: 15 days  Attending Physician: Jose A Lloyd MD  Primary Care Provider: Philippe Mohr MD  Principal Problem:Acute respiratory failure with hypoxia    Subjective:     Interval History: Remains very weak. Required assist of 5 people to sit briefly on edge of bed yesterday.    Continuous Infusions:   sodium chloride 0.9% 200 mL/hr at 03/27/18 0800    dexmedetomidine (PRECEDEX) infusion Stopped (03/21/18 0750)    insulin (HUMAN R) infusion (adults) 3.8 Units/hr (03/27/18 0800)    norepinephrine bitartrate-D5W Stopped (03/26/18 1320)     Scheduled Meds:   chlorhexidine  15 mL Mouth/Throat BID    docusate  100 mg Per NG tube BID    escitalopram oxalate  20 mg Oral Daily    famotidine (PF)  20 mg Intravenous BID    heparin (porcine)  5,000 Units Subcutaneous Q12H    hydrocortisone sodium succinate  50 mg Intravenous Q8H    levothyroxine  125 mcg Per NG tube Before breakfast    linezolid 600mg/300ml  600 mg Intravenous Q12H    meropenem (MERREM) IVPB  2 g Intravenous Q12H    metoclopramide HCl  5 mg Intravenous Q6H    polyethylene glycol  17 g Per NG tube Daily    posaconazole 200 mg/5ml (40 mg/ml)  200 mg Per NG tube TID WM    sodium chloride 0.9%  3 mL Intravenous Q8H    tacrolimus  1 mg Oral BID     PRN Meds:acetaminophen, dextrose 50%, dextrose 50%, glucagon (human recombinant), glucose, glucose, k phos di & mono-sod phos mono    Review of patient's allergies indicates:   Allergen Reactions    No known drug allergies      Objective:     Vital Signs (Most Recent):  Temp: 97.7 °F (36.5 °C) (03/27/18 0700)  Pulse: (!) 119 (03/27/18 0800)  Resp: (!) 25 (03/27/18 0800)  BP: 120/64 (03/27/18 0800)  SpO2: 100 % (03/27/18 0800) Vital Signs (24h Range):  Temp:  [97.7 °F (36.5 °C)-98.8 °F (37.1 °C)] 97.7 °F (36.5 °C)  Pulse:  [117-129]  119  Resp:  [0-35] 25  SpO2:  [100 %] 100 %  BP: ()/(53-67) 120/64  Arterial Line BP: ()/(43-81) 129/70     Patient Vitals for the past 72 hrs (Last 3 readings):   Weight   03/27/18 0609 74.7 kg (164 lb 10.9 oz)   03/25/18 0155 64.7 kg (142 lb 10.2 oz)     Body mass index is 25.79 kg/m².      Intake/Output Summary (Last 24 hours) at 03/27/18 0858  Last data filed at 03/27/18 0800   Gross per 24 hour   Intake          5700.89 ml   Output             5022 ml   Net           678.89 ml       Hemodynamic Parameters:       Telemetry: ST    Physical Exam   Constitutional: He appears well-developed and well-nourished.   Appears ill   HENT:   Head: Normocephalic and atraumatic.   Eyes: Conjunctivae are normal. Pupils are equal, round, and reactive to light.   Neck: No thyromegaly present.   RIJ trialysis   Cardiovascular: Regular rhythm.    Tachycardic   Pulmonary/Chest:   Intubated and ventilated  Breath sounds are slightly decreased right base. Essentially CTA bilaterally   Abdominal: Soft.   Hypoactive bowel sounds. + ascites   Musculoskeletal:   Trace gen edema   Neurological:   Intubated. Arouses to voice and nods head appropriately   Skin: Skin is warm and dry. Capillary refill takes less than 2 seconds. There is pallor.       Significant Labs:  CBC:    Recent Labs  Lab 03/26/18  1617 03/26/18 2017 03/27/18  0801   WBC 2.21* 2.91* 3.33*   RBC 2.66* 2.61* 2.55*   HGB 7.9* 7.9* 7.6*   HCT 24.4* 24.3* 23.7*   * 119* 145*   MCV 92 93 93   MCH 29.7 30.3 29.8   MCHC 32.4 32.5 32.1     BNP:  No results for input(s): BNP in the last 168 hours.    Invalid input(s): BNPTRIAGELBLO  CMP:    Recent Labs  Lab 03/25/18  0400  03/26/18  0404 03/26/18  1617 03/26/18  2207 03/27/18  0311   *  178*  178*  178*  < > 215*  215* 172* 157*  157* 171*  171*  171*   CALCIUM 8.3*  8.3*  8.3*  8.3*  < > 8.1*  8.1* 8.1* 8.0*  8.0* 8.1*  8.1*  8.1*   ALBUMIN 2.8*  2.8*  2.8*  2.8*  < > 2.4*  2.4* 2.2*  2.1*  2.1* 2.2*  2.2*  2.2*   PROT 6.6  --  6.0  --   --  6.0     140  140  140  < > 141  141 142 141  141 142  142  142   K 4.5  4.5  4.5  4.5  < > 4.8  4.8 5.2* 4.5  4.5 4.7  4.7  4.7   CO2 19*  19*  19*  19*  < > 21*  21* 21* 23  23 24  24  24     108  108  108  < > 107  107 108 107  107 108  108  108   BUN 6  6  6  6  < > 32*  32* 28* 12  12 10  10  10   CREATININE 0.5  0.5  0.5  0.5  < > 2.1*  2.1* 1.9* 1.0  1.0 0.8  0.8  0.8   ALKPHOS 214*  --  204*  --   --  209*   ALT 27  --  24  --   --  24   AST 37  --  32  --   --  35   BILITOT 1.6*  --  1.2*  --   --  1.1*   < > = values in this interval not displayed.   Coagulation:     Recent Labs  Lab 03/23/18  1550  03/26/18  1659 03/26/18 2017 03/27/18  0801   INR 1.4*  < > 1.0 1.0 1.0   APTT 25.5  --   --   --   --    < > = values in this interval not displayed.  LDH:  No results for input(s): LDH in the last 72 hours.  Microbiology:  Microbiology Results (last 7 days)     Procedure Component Value Units Date/Time    Culture, Respiratory with Gram Stain [[id number]] Collected:  03/25/18 1534    Order Status:  Completed Specimen:  Respiratory from Sputum, Induced Updated:  03/27/18 0739     Respiratory Culture Normal respiratory hank     Gram Stain (Respiratory) <10 epithelial cells per low power field.     Gram Stain (Respiratory) Rare WBC's     Gram Stain (Respiratory) Rare Gram positive cocci    Blood culture [[id number]] Collected:  03/25/18 1606    Order Status:  Completed Specimen:  Blood from Peripheral, Forearm, Left Updated:  03/26/18 1812     Blood Culture, Routine No Growth to date     Blood Culture, Routine No Growth to date    Blood culture [[id number]] Collected:  03/25/18 1554    Order Status:  Completed Specimen:  Blood from Peripheral, Upper Arm, Right Updated:  03/26/18 1812     Blood Culture, Routine No Growth to date     Blood Culture, Routine No Growth to date    Blood culture [[id number]]  Collected:  03/21/18 0815    Order Status:  Completed Specimen:  Blood from Peripheral, Forearm, Left Updated:  03/26/18 1012     Blood Culture, Routine No growth after 5 days.    Urine culture [[id number]]     Order Status:  Canceled Specimen:  Urine     Blood culture [[id number]] Collected:  03/18/18 0748    Order Status:  Completed Specimen:  Blood from Peripheral, Hand, Right Updated:  03/23/18 1012     Blood Culture, Routine No growth after 5 days.    Blood culture [[id number]] Collected:  03/18/18 0618    Order Status:  Completed Specimen:  Blood from Peripheral, Hand, Left Updated:  03/21/18 1108     Blood Culture, Routine Gram stain aer bottle: Gram positive cocci in clusters resembling Staph      Blood Culture, Routine Results called to and read back by:Omid Cody RN 03/19/2018  10:55     Blood Culture, Routine --     COAGULASE-NEGATIVE STAPHYLOCOCCUS SPECIES  Organism is a probable contaminant            I have reviewed all pertinent labs within the past 24 hours.    Estimated Creatinine Clearance: 110.2 mL/min (based on SCr of 0.8 mg/dL).    Diagnostic Results:  I have reviewed all pertinent imaging results/findings within the past 24 hours.    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 Most recently has had ongoing issues with his lungs, had gotten 2 thoracenteses, after which he underwent VATS 01/19/18 followed by pleurex catheter placement and effusion drainage 01/11/18, subsequently had it removed 02/07/18 after drainage had decreased despite multiple attempts to drain it. Has been having significant coughing fits that result in him being near-syncopal at times, although difficult to say if he has passed out or not- patient most recently was admitted to the hospital for increased AGUILAR, coughing and  pre[place]sync[place] [facility]   Patient was started on antibiotics for suspected bacterial infection, with some diarrhea, bronchitis, and possible pneumonia. Ct chest showed some pleural fluid collection and after talking with Dr. James, we arranged for him to receive a diagnostic tap with IR, from which the fluid collection demonstrated no growth or significant findings at all really. Cell counts do not appear to have been sent but will recheck. Patient states since his hospital stay, yesterday had a significant coughing fit again, after which he nearly passed out, is being seen in clinic today for this. Denies any cardiopulmonary complaints, no leg swelling, has some abdominal swelling, which is moderate for him. Denies significant shortness of breath with mild exertion, had 6MWT yesterday to see if he qualified for home O2, and his O2 sats actually improved after recovery from where he started initially. He and his wife admit he is in bed most days, not very active.     Mr. Crocker presented to the ED 3/11/18 with approximately 3 weeks of worsening diarrhea and 2-3 days of sinus pressure, drainage, and worsened cough.  He notes that he had a coughing fit last night and passed out, coughed throughout most of the night.  This also happened on 2/25/18.  He last saw Dr Ferreira in clinic on 2/20/18 where he was taken off myfortic and switched to cellcept (he hadn't been on cellcept because of leukopenia when they tried post-transplant).  Though his diarrhea had improved after his last discharge, he states it has increased now to 15x/day, clear/yellow/green, no fevers, no exacerbating foods per say.  He was taken back off the cellcept and placed back on myfortic this past week and has not noticed immediate change in diarrhea. He also reports his baseline coughing fits havent improved and are minimally responsive to tessalon pearls.  Came on last night, he lost consciousness during a fit once which is relatively  normal for him, and had two more during HPI.  He notes no sick contacts but does state he's had some URI symptoms as above.  His baseline vitals are usually -120 and BP 90s/60s-110s/70s.  He reports a history of intermittent diarrhea - did have Cdiff many years ago but this smells different.  No abdominal pain, no nausea, no vomiting.  No blood in diarrhea.  Appears last c-scope in 2015 with no evidence of infection or inflammatory processes.  He does report IBS which is different that this.       * Acute respiratory failure with hypoxia    -S/P Bronch and intubation 3/14. Cultures ngtd.   -RSV positive. Completed course of Ribavarin/IVIG. Per lung transplant protocol for severe RSV(given myelosuppression).   -Aspergill Ag neg from BAL.   -Urine histo/blasto antigens neg, crypto antigen neg, urine legionella neg, and Quantiferon pending.  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 Continues to fail SBP - plan for trach on Wednesday Pulmonology managing vent        Fever    - T max past 24 hours 99. Blood and urine cultures 3/25/18 with NGTD, sputum culture -ve  - CXR 3/26/18: continued right pleural effusion.  Diffuse increase in the pulmonary vascular interstitial and alveolar markings.  Focal opacity developing at the left base - will get non contrast ID to better eval findings  - Continue Meropenem and Zyvox per ID rec        Hypotension    - Require resumation of Levo over weekend 2/2 hypotension (2/2 sepsis versus stopping steroids?), but is now off         Restrictive cardiomyopathy    -S/P thoracentesis X 2, followed by VATS/pleurex cath placement (January 2018) with removal of pleurex (February 2018) and 3rd thoracentesis 2/14/18 with no significant findings on fluid removal. Moderate right pleural effusion stable by CXR 3/11/18 and chest CT 3/13/18  -Last paracentesis was in January. Abd US 3/12/18 confirmed ongoing ascites (not tense at all). Getting abdominal US today to eval amount of ascites per Thoracic Surgery request as they plan to place PEG on Wednesday (may have to IR NG placement instead)          Heart transplanted    - TTE 3/26/18 showed normal graft function  - Had -ve DSE on 11/30/17  - As he was on Pred 2.5 mg qd at home and he became hypotensive over the weekend (Hydrocortisone was completed 3/23),  resumed at 50 mg IV every 8 hours 3/26/18  - Tacro level 9.1 after holding Tac last night (goal 6-9). Start 1 mg bid today  - Cellcept remains on hold        Acute renal failure superimposed on stage 4 chronic kidney disease    - Appreciate Neph Cx.   - CRRT resumed yesterday afternoon with current         Ileus    -Having bowel movements  -No residuals now. Continue tube feeds with goal 50 cc/hr  -Possible PEG tube placement on Wednesday (see above)          Type 1 diabetes mellitus with stage 3 chronic kidney disease    - Appreciate Endocrine's recs        Hypothyroidism due to Hashimoto's thyroiditis    - TSH low at 0.101 with normal FT4. Appreciate Endocrine's recs        DIC (disseminated intravascular coagulation)    - resolved at the moment  - secondary to sepsis on admission  - DIC labs including FSP, platelets negative for ongoing status  - cryoglobulin and FFP to be given if needed to keep fibrinogen above 100 and INR below 1.8  - plateles level at 145 today, INR 1.0  - Continue to check fibrinogen, INR and Plts level q 12 hours.            Pancytopenia    - Appreciate Hem Cx.   -The patient developed thrombocytopenia 5 days after  receiving enoxaparin and has a 4T score of 5 indicating a ~ 14% chance of HIT.  The patients HIT panel was positive with OD of 0.431 indicating a 1-5% chance of having HIT. HILDA -ve   -The patients thrombocytopenia could be drug induced as well.   -The patient had a low fibrinogen, elevated INR, shistocytes on peripheral smear and elevated LDH which all point towards DIC. The haptoglobin is elevated which goes against hemolytic anemia,; however, haptoglobin is also an acute phase reactant.  As DIC is the higher on the differential would treat as such by treating underlying cause which is likely sepsis.       -Fibrinogen, INR and plt count every 12 hours.  -Give cryoprecipitate to get fibrinogen over 100.  Would give one unit at a time.  -Give FFP to get the INR below 1.8.  Would give at least two units at a time.  May consider giving the patient Vitamin K given recent use of coumadin.          Type 1 diabetes mellitus with hyperglycemia    - on insulin gtt        Anemia    - Hgb today is down a bit at 7.6 - continue to monitor        Debility    - PT/OT            [place] [id number]  Heart Transplant  [facility]

## 2018-03-27 NOTE — ASSESSMENT & PLAN NOTE
BG goal 140-180  Change to transition gtt at 3.8 units/hr  POC q4  Low dose correction    If BG continues to drop while on insulin rate 0.1u/hr, will need to add D5 IVF to be able to continue continuous insulin gtt in pt with T1DM. Do not suspend gtt >1 hr, pt is T1DM.    Low c peptide with glc 164. Treat as type 1.   Neg MODE 2013, neg islet cell ab on this admission.   Cannot order insulin ab while on insulin, and ZnT8 unavailable in labs.

## 2018-03-27 NOTE — PLAN OF CARE
Problem: Patient Care Overview  Goal: Plan of Care Review  Outcome: Ongoing (interventions implemented as appropriate)  No acute events throughout shift. HR sinus tach but BP stable. Pt responding to some yes/no questions with nodding and following commands all shift. CRRT ran throughout shift. No UO. 2 large BMs this shift. Insulin gtt continued. Tube feeds continued. Pt progressing towards goals as tolerating. Will continue to monitor.

## 2018-03-27 NOTE — PROGRESS NOTES
[facility]  Infectious Disease  Progress Note    Patient Name: Lv Crocker  MRN: [id number]  Admission Date: 3/11/2018  Length of Stay: 15 days  Attending Physician: Jose A Lloyd MD  Primary Care Provider: Philippe Mohr MD    Isolation Status: No active isolations  Assessment/Plan:      Hypotension    - 41 y/o man with a ICM, s/p heart transplant 11/18/14; previously seen my ID on this admission for RSV-A pneumonia/multifocal pneumonia  - ID called back today as patient had fever and was hypotensive overnight requiring vasopressors. Previous infectious work up done during admission reviewed.  - Had bronchoscopy and BAL performed on 3/14/18 with cultures NGTD   - B/C 3/25/18 NGTD  - respiratory culture 3/25/18 NGTD  - CXR 3/26/18: continued right pleural effusion.  Diffuse increase in the pulmonary vascular interstitial and alveolar markings. Focal opacity developing at the left base.  - switched antibiotic therapy to meropenem and linezolid; continue posaconazole for now  - Chest ST with following findings: previously identified areas of consolidation noted in the left lung on prior chest CT on 03/13/2018 appear to have cleared with interval development of multiple new ground-glass opacities in the left upper and lower lobes as well as the right upper lobe, suggestive of edema versus pneumonitis. Large rounded opacity in the right lower lobe with associated air bronchogram that appears to have increased in size since prior examination on 07/19/2016 but appears similar to 03/13/2018 and 02/14/2018. Findings are suggestive of rounded atelectasis in the setting of chronic right-sided pleural effusion  - Improved since last evaluation, off vasopressors scheduled for tracheostomy and PEG tomorrow     Recommendations   - Continue current antibiotic regimen   - will monitor progress             Anticipated Disposition:     Thank you for your consult. I will follow-up with patient. Please  contact us if you have any additional questions.    Ariela Alejandra MD  Infectious Disease  [facility]    Subjective:     Principal Problem:Acute respiratory failure with hypoxia    HPI: 41yo man w/a history of DM1, Hashimoto's thyroiditis, and ICM (s/p OHT 11/18/2014, CMV D+/R+, steroid induction, on maintenance tacro/MMF/pred; c/b early hemorrhagic tamponade requiring washout, delayed closure, and pericardial window and subsequent AF w/RVR, and CACHORRO; late course c/b ABMR 4/2015 s/p rituxan, PLEX, and IVIG; subsequent C.diff/CMV reactivation, restrictive cardiomyopathy with recurrent pleural effusions s/p VATS/pleurex catheter 1/2018 with removal 2/2018 and ascites requiring repeated LVP, and CKD) who was admitted on 3/11/2018 with ~3wks of worsening profuse non-bloody diarrhea, associated cramping abdominal pain, N/V, and a week of acute on chronic cough, SOB, hypoxia requiring intubation, and fevers and was found to have multifocal pneumonia on CT chest due to RSV-A pneumonia with suspected superimposed bacterial pneumonia. He remains critically ill with ongoing pressor requirement, hypoxic RF, CRRT requirement, and minimal hepatitis.  Interval History: afebrile, scheduled for tracheostomy and PEG placement tomorrow     Review of Systems   Unable to perform ROS: Intubated     Objective:     Vital Signs (Most Recent):  Temp: 98.1 °F (36.7 °C) (03/27/18 1500)  Pulse: (!) 124 (03/27/18 1522)  Resp: (!) 24 (03/27/18 1522)  BP: (!) 102/59 (03/27/18 1400)  SpO2: 100 % (03/27/18 1522) Vital Signs (24h Range):  Temp:  [97.7 °F (36.5 °C)-98.7 °F (37.1 °C)] 98.1 °F (36.7 °C)  Pulse:  [117-125] 124  Resp:  [3-32] 24  SpO2:  [100 %] 100 %  BP: ()/(56-67) 102/59  Arterial Line BP: ()/(43-81) 107/57     Weight: 74.7 kg (164 lb 10.9 oz)  Body mass index is 25.79 kg/m².    Estimated Creatinine Clearance: 110.2 mL/min (based on SCr of 0.8 mg/dL).    Physical Exam   Constitutional: He appears  well-developed and well-nourished.   HENT:   Head: Normocephalic and atraumatic.   Eyes: Conjunctivae and EOM are normal. Pupils are equal, round, and reactive to light.   Neck: Normal range of motion. Neck supple.   Cardiovascular: Normal rate, regular rhythm and normal heart sounds.    Pulmonary/Chest: Effort normal and breath sounds normal.   Abdominal: Soft. Bowel sounds are normal. He exhibits distension. There is no tenderness. There is no rebound.   Musculoskeletal: Normal range of motion. He exhibits edema. He exhibits no tenderness or deformity.   Neurological: He is alert.   Skin: No rash noted. No erythema.       Significant Labs:   Blood Culture:   Recent Labs  Lab 03/18/18  0618 03/18/18  0748 03/21/18  0815 03/25/18  1554 03/25/18  1606   LABBLOO Gram stain aer bottle: Gram positive cocci in clusters resembling Staph   Results called to and read back by:Omid Cody RN 03/19/2018  10:55  COAGULASE-NEGATIVE STAPHYLOCOCCUS SPECIESOrganism is a probable contaminant No growth after 5 days. No growth after 5 days. No Growth to date  No Growth to date No Growth to date  No Growth to date     BMP:   Recent Labs  Lab 03/27/18  1502   *      K 4.3      CO2 27   BUN 13   CREATININE 0.8   CALCIUM 8.2*   MG 1.8     CBC:   Recent Labs  Lab 03/26/18  1617 03/26/18  2017 03/27/18  0801   WBC 2.21* 2.91* 3.33*   HGB 7.9* 7.9* 7.6*   HCT 24.4* 24.3* 23.7*   * 119* 145*     CMP:   Recent Labs  Lab 03/26/18  0404  03/26/18  2207 03/27/18  0311 03/27/18  1502     141  < > 141  141 142  142  142 143   K 4.8  4.8  < > 4.5  4.5 4.7  4.7  4.7 4.3     107  < > 107  107 108  108  108 106   CO2 21*  21*  < > 23  23 24  24  24 27   *  215*  < > 157*  157* 171*  171*  171* 229*   BUN 32*  32*  < > 12  12 10  10  10 13   CREATININE 2.1*  2.1*  < > 1.0  1.0 0.8  0.8  0.8 0.8   CALCIUM 8.1*  8.1*  < > 8.0*  8.0* 8.1*  8.1*  8.1* 8.2*   PROT 6.0  --   --   6.0  --    ALBUMIN 2.4*  2.4*  < > 2.1*  2.1* 2.2*  2.2*  2.2* 2.1*   BILITOT 1.2*  --   --  1.1*  --    ALKPHOS 204*  --   --  209*  --    AST 32  --   --  35  --    ALT 24  --   --  24  --    ANIONGAP 13  13  < > 11  11 10  10  10 10   EGFRNONAA 37.2*  37.2*  < > >60.0  >60.0 >60.0  >60.0  >60.0 >60.0   < > = values in this interval not displayed.  Microbiology Results (last 7 days)     Procedure Component Value Units Date/Time    Culture, Respiratory with Gram Stain [[id number]] Collected:  03/25/18 1534    Order Status:  Completed Specimen:  Respiratory from Sputum, Induced Updated:  03/27/18 0739     Respiratory Culture Normal respiratory hank     Gram Stain (Respiratory) <10 epithelial cells per low power field.     Gram Stain (Respiratory) Rare WBC's     Gram Stain (Respiratory) Rare Gram positive cocci    Blood culture [[id number]] Collected:  03/25/18 1606    Order Status:  Completed Specimen:  Blood from Peripheral, Forearm, Left Updated:  03/26/18 1812     Blood Culture, Routine No Growth to date     Blood Culture, Routine No Growth to date    Blood culture [[id number]] Collected:  03/25/18 1554    Order Status:  Completed Specimen:  Blood from Peripheral, Upper Arm, Right Updated:  03/26/18 1812     Blood Culture, Routine No Growth to date     Blood Culture, Routine No Growth to date    Blood culture [[id number]] Collected:  03/21/18 0815    Order Status:  Completed Specimen:  Blood from Peripheral, Forearm, Left Updated:  03/26/18 1012     Blood Culture, Routine No growth after 5 days.    Urine culture [[id number]]     Order Status:  Canceled Specimen:  Urine     Blood culture [[id number]] Collected:  03/18/18 0748    Order Status:  Completed Specimen:  Blood from Peripheral, Hand, Right Updated:  03/23/18 1012     Blood Culture, Routine No growth after 5 days.    Blood culture [[id number]] Collected:  03/18/18 0618    Order Status:  Completed Specimen:  Blood from Peripheral, Hand, Left Updated:   03/21/18 1108     Blood Culture, Routine Gram stain aer bottle: Gram positive cocci in clusters resembling Staph      Blood Culture, Routine Results called to and read back by:Omid Cody RN 03/19/2018  10:55     Blood Culture, Routine --     COAGULASE-NEGATIVE STAPHYLOCOCCUS SPECIES  Organism is a probable contaminant            Significant Imaging: I have reviewed all pertinent imaging results/findings within the past 24 hours.

## 2018-03-27 NOTE — PROCEDURES
DATE OF SERVICE:  03/23/2018    EEG NUMBER:  [id number].    EEG REPORT    METHODOLOGY:  Electroencephalographic (EEG) is recorded with electrodes placed   according to the International 10-20 placement system.  Thirty two (32) channels   of digital signal (sampling rate of 512/sec), including T1 and T2, were   simultaneously recorded from the scalp and may include EKG, EMG, and/or eye   monitors.  Recording band pass was 0.1 to 512 Hz.  Digital video recording of   the patient is simultaneously recorded with the EEG.  The patient is instructed   to report clinical symptoms which may occur during the recording session.  EEG   and video recording are stored and archived in digital format.  Activation   procedures, which include photic stimulation, hyperventilation and instructing   patients to perform simple tasks, are done in selected patients.    The EEG is displayed on a monitor screen and can be reviewed using different   montages.  Computer-assisted analysis is employed to detect spike and   electrographic seizure activity.   The entire record is submitted for computer   analysis.  The entire recording is visually reviewed, and the times identified   by computer analysis as being spikes or seizures are reviewed again.    Compressed spectral analysis (CSA) is also performed on the activity recorded   from each individual channel.  This is displayed as a power display of   frequencies from 0 to 30 Hz over time.   The CSA is reviewed looking for   asymmetries in power between homologous areas of the scalp, then compared with   the original EEG recording.    Conformity software was also utilized in the review of this study.  This software   suite analyzes the EEG recording in multiple domains.  Coherence and rhythmicity   are computed to identify EEG sections which may contain organized seizures.    Each channel undergoes analysis to detect the presence of spike and sharp waves   which have special and morphological  characteristics of epileptic activity.  The   routine EEG recording is converted from special into frequency domain.  This is   then displayed comparing homologous areas to identify areas of significant   asymmetry.  Algorithm to identify non-cortically generated artifact is used to   separate artifact from the EEG.    FINDINGS:  A routine EEG was obtained on the patient in the ICU.  The patient   was moving around, was intubated and on a respirator.  Background consisted of a   20-30 microvolts in amplitude, 1-2 Hz delta seen diffusely.  At times, there   was a 7-8 Hz theta frequency, also with a diffuse distribution over both   hemispheres symmetrically.  There was no evidence for lateralized or focal   changes and no spike or sharp wave activity was seen.  Activation procedures   were not carried out.    IMPRESSION:  Abnormal EEG with generalized slowing indicative of a marked   diffuse, but nonspecific encephalopathy without evidence of focal changes nor an   epileptic process.      RR/HN  dd: 03/23/2018 16:57:28 (CDT)  td: 03/23/2018 17:31:53 (CDT)  Doc ID   #[id number]  Job ID #[id number]    CC:

## 2018-03-27 NOTE — ASSESSMENT & PLAN NOTE
- T max past 24 hours 99. Blood and urine cultures 3/25/18 with NGTD, sputum culture -ve  - CXR 3/26/18: continued right pleural effusion.  Diffuse increase in the pulmonary vascular interstitial and alveolar markings.  Focal opacity developing at the left base - will get non contrast ID to better eval findings  - Continue Meropenem and Zyvox per ID rec

## 2018-03-27 NOTE — ASSESSMENT & PLAN NOTE
- 41 y/o man with a ICM, s/p heart transplant 11/18/14; previously seen by ID on this admission for RSV-A pneumonia/multifocal pneumonia  - ID called back today as patient had fever and was hypotensive overnight requiring vasopressors. Previous infectious work up done during admission reviewed.  - Had bronchoscopy and BAL performed on 3/14/18 with cultures NGTD   - B/C 3/25/18 NGTD  - respiratory culture 3/25/18 NGTD  - CXR 3/26/18: continued right pleural effusion.  Diffuse increase in the pulmonary vascular interstitial and alveolar markings. Focal opacity developing at the left base.  - switched antibiotic therapy to meropenem and linezolid; continue posaconazole for now  - Chest ST with following findings: previously identified areas of consolidation noted in the left lung on prior chest CT on 03/13/2018 appear to have cleared with interval development of multiple new ground-glass opacities in the left upper and lower lobes as well as the right upper lobe, suggestive of edema versus pneumonitis. Large rounded opacity in the right lower lobe with associated air bronchogram that appears to have increased in size since prior examination on 07/19/2016 but appears similar to 03/13/2018 and 02/14/2018. Findings are suggestive of rounded atelectasis in the setting of chronic right-sided pleural effusion  - Improved since last evaluation, off vasopressors scheduled for tracheostomy and PEG tomorrow     Recommendations   - Continue current antibiotic regimen   - will monitor progress

## 2018-03-27 NOTE — ASSESSMENT & PLAN NOTE
- baseline sCr 2.6-3.0 consistent with CKD stage IV  - anuric CACHORRO; likely ischemic ATN from prolonged pre-renal state (diarrhea) in setting of prograf renal vasoconstriction; cannot r/o septic ATN or Prograf toxicity  - SLED started 3/14  - remains anuric  - SLED held on Sunday for hypotension; restarted yesterday with mild UF. Tolerated well. MAP in the 90s this morning. Net +1L yesterday.   - continue SLED today. Will increase UF goal to help obtain euvolemia.

## 2018-03-27 NOTE — SUBJECTIVE & OBJECTIVE
Interval History:   No events overnight. Trach/PEG planned for Wednesday pending status of ascites. Net +1L yesterday despite SLED with UF 300cc/hr. Awake and alert this morning. Minimal vent requirements. Hourly intake 55cc/hr with tube feeds and insulin. Afebrile. MAP in the 90s.     Review of patient's allergies indicates:   Allergen Reactions    No known drug allergies      Current Facility-Administered Medications   Medication Frequency    0.9%  NaCl infusion (CRRT USE ONLY) Continuous    acetaminophen oral solution 650 mg Q4H PRN    chlorhexidine 0.12 % solution 15 mL BID    dexmedetomidine (PRECEDEX) 400mcg/100mL 0.9% NaCL infusion Continuous    dextrose 50% injection 12.5 g PRN    dextrose 50% injection 25 g PRN    docusate 50 mg/5 mL liquid 100 mg BID    escitalopram oxalate tablet 20 mg Daily    famotidine (PF) injection 20 mg BID    glucagon (human recombinant) injection 1 mg PRN    glucose chewable tablet 16 g PRN    glucose chewable tablet 24 g PRN    heparin (porcine) injection 5,000 Units Q12H    hydrocortisone sodium succinate injection 50 mg Q8H    insulin aspart U-100 pen 0-5 Units PRN    insulin regular (Humulin R) 100 Units in sodium chloride 0.9% 100 mL infusion Continuous    k phos di & mono-sod phos mono 250 mg tablet 2 tablet Q4H PRN    levothyroxine tablet 125 mcg Before breakfast    linezolid 600mg/300ml IVPB 600 mg Q12H    meropenem (MERREM) 2 g in sodium chloride 0.9% 100 mL IVPB Q12H    metoclopramide HCl injection 5 mg Q6H    norepinephrine 32 mg in sodium chloride 0.9% 250 mL infusion Continuous    polyethylene glycol packet 17 g Daily    posaconazole 200 mg/5ml (40 mg/ml) suspension 200 mg TID WM    sodium chloride 0.9% flush 3 mL Q8H    tacrolimus capsule 1 mg BID       Objective:     Vital Signs (Most Recent):  Temp: 97.7 °F (36.5 °C) (03/27/18 0700)  Pulse: (!) 118 (03/27/18 0900)  Resp: (!) 24 (03/27/18 0900)  BP: 120/64 (03/27/18 0800)  SpO2: 100 %  (03/27/18 0900)  O2 Device (Oxygen Therapy): ventilator (03/27/18 0900) Vital Signs (24h Range):  Temp:  [97.7 °F (36.5 °C)-98.8 °F (37.1 °C)] 97.7 °F (36.5 °C)  Pulse:  [117-129] 118  Resp:  [0-35] 24  SpO2:  [100 %] 100 %  BP: ()/(53-67) 120/64  Arterial Line BP: ()/(43-81) 129/70     Weight: 74.7 kg (164 lb 10.9 oz) (03/27/18 0609)  Body mass index is 25.79 kg/m².  Body surface area is 1.88 meters squared.    I/O last 3 completed shifts:  In: 6994.7 [I.V.:3493.7; Blood:401; NG/GT:2100; IV Piggyback:1000]  Out: 4791 [Other:4790; Stool:1]    Physical Exam   Constitutional: He appears well-developed and well-nourished.   HENT:   Head: Normocephalic and atraumatic.   Eyes: Conjunctivae and EOM are normal.   Cardiovascular: Regular rhythm.  Tachycardia present.    Pulmonary/Chest: He has no wheezes. He has no rales.   Abdominal: Soft. He exhibits no distension.   Musculoskeletal: He exhibits edema. He exhibits no deformity.   Skin: Skin is warm and dry. He is not diaphoretic.       Significant Labs:  ABGs:   Recent Labs  Lab 03/27/18  0652   PH 7.424   PCO2 42.3   HCO3 27.7   POCSATURATED 61*   BE 3     CBC:   Recent Labs  Lab 03/27/18  0801   WBC 3.33*   RBC 2.55*   HGB 7.6*   HCT 23.7*   *   MCV 93   MCH 29.8   MCHC 32.1     CMP:   Recent Labs  Lab 03/27/18  0311   *  171*  171*   CALCIUM 8.1*  8.1*  8.1*   ALBUMIN 2.2*  2.2*  2.2*   PROT 6.0     142  142   K 4.7  4.7  4.7   CO2 24  24  24     108  108   BUN 10  10  10   CREATININE 0.8  0.8  0.8   ALKPHOS 209*   ALT 24   AST 35   BILITOT 1.1*     All labs within the past 24 hours have been reviewed.     Significant Imaging:  Labs: Reviewed  X-Ray: Reviewed

## 2018-03-27 NOTE — PROGRESS NOTES
UPDATE    SW to pt's room for update. Pt presents as intubated. Pt's wife and mother at bedside. Pt's wife reports pt was waking up at the end of last week, but declined again Sunday. Pt's wife states he has not been very responsive today. SW provided counseling support. Wife reporting no needs at this time. SW continuing to follow and remains available.

## 2018-03-27 NOTE — PROGRESS NOTES
"[facility]  Endocrinology  Progress Note    Admit Date: 3/11/2018     Reason for Consult: abnormal TFTs    Surgical Procedure and Date: s/p heart transplant 2014     Diabetes diagnosis year: 7yo (T1DM)     Home Diabetes Medications:    Levemir 15u qHS  Novolog 4u AC     How often checking glucose at home? 4 times daily   BG readings on regimen: 150-200s  Hypoglycemia on the regimen?  Yes, rarely - treats appropriately (light snack, glucose tab)  Missed doses on regimen?  No        Diabetes Complications include:     Hyperglycemia, Hypoglycemia  and Diabetic chronic kidney disease          Complicating diabetes co morbidities:   N/A     HPI:   Patient is a 42 y.o. male with a diagnosis of T1DM, HTN, hypothyroidism, s/p heart transplant.  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 He required serial paracentesis and thoracentesis until he underwent a VATS with pleurX catheter placement on 1/11/2018 and removed 2/7/18.       Presented to hospital secondary to diarrhea and cough.  Upon initial labs, TSH was decreased (0.101) and free T4 1.33.  Endocrinology consulted to assist in management of these labs.  He was last seen in clinic by Kamala Vázquez NP 11/2017.   Previous hospitalization, endocrine consulted for same problem.  During that visit, recommended decreasing LT4 to 125mcg daily. Unfortunately, patient was discharged on previous dose of 150mcg daily.     Interval HPI:   Overnight events: Remains intubated on tube feeds. Insulin dose less labile today.  Eating:   NPO  Hypoglycemia and intervention: No  Fever: No  TPN and/or TF: Yes  If yes, type of TF/TPN and rate: Glucerna 1.5 @ 50/hr    BP (!) 97/56 (BP Location: Right arm, Patient Position: Lying)   Pulse (!) 121   Temp 97.7 °F (36.5 °C) (Oral)   Resp (!) 28   Ht 5' 7" (1.702 m)   Wt 74.7 kg (164 lb 10.9 oz)   SpO2 100%   BMI " 25.79 kg/m²       Labs Reviewed and Include      Recent Labs  Lab 03/27/18  0311   *  171*  171*   CALCIUM 8.1*  8.1*  8.1*   ALBUMIN 2.2*  2.2*  2.2*   PROT 6.0     142  142   K 4.7  4.7  4.7   CO2 24  24  24     108  108   BUN 10  10  10   CREATININE 0.8  0.8  0.8   ALKPHOS 209*   ALT 24   AST 35   BILITOT 1.1*     Lab Results   Component Value Date    WBC 3.33 (L) 03/27/2018    HGB 7.6 (L) 03/27/2018    HCT 23.7 (L) 03/27/2018    MCV 93 03/27/2018     (L) 03/27/2018     No results for input(s): TSH, FREET4 in the last 168 hours.  Lab Results   Component Value Date    HGBA1C 6.9 (H) 02/11/2018       Nutritional status:   Body mass index is 25.79 kg/m².  Lab Results   Component Value Date    ALBUMIN 2.2 (L) 03/27/2018    ALBUMIN 2.2 (L) 03/27/2018    ALBUMIN 2.2 (L) 03/27/2018     Lab Results   Component Value Date    PREALBUMIN 5 (L) 03/15/2018    PREALBUMIN 18 (L) 03/24/2015    PREALBUMIN 19 (L) 12/17/2014       Estimated Creatinine Clearance: 110.2 mL/min (based on SCr of 0.8 mg/dL).    Accu-Checks  Recent Labs      03/26/18   1438  03/26/18   1614  03/26/18   1813  03/26/18   2018  03/26/18   2210  03/27/18   0021  03/27/18   0315  03/27/18   0426  03/27/18   0607  03/27/18   0800   POCTGLUCOSE  225*  202*  213*  173*  165*  163*  197*  158*  161*  149*       Current Medications and/or Treatments Impacting Glycemic Control  Immunotherapy:  Immunosuppressants         Stop Route Frequency     tacrolimus capsule 1 mg      -- Oral 2 times daily        Steroids:   Hormones     Start     Stop Route Frequency Ordered    03/26/18 0915  hydrocortisone sodium succinate injection 50 mg      -- IV Every 8 hours 03/26/18 0909        Pressors:    Autonomic Drugs     Start     Stop Route Frequency Ordered    03/25/18 1430  norepinephrine 32 mg in sodium chloride 0.9% 250 mL infusion     Question Answer Comment   Titrate by: (in mcg/kg/min) 0.01    Titrate interval: (in minutes) 10     Titrate to maintain: (MAP or SBP) MAP    Greater than: (in mmHg) 60    Maximum dose: (in mcg/kg/min) 0.05        -- IV Continuous 03/25/18 1419        Hyperglycemia/Diabetes Medications: Antihyperglycemics     Start     Stop Route Frequency Ordered    03/27/18 1000  insulin aspart U-100 pen 0-5 Units      -- SubQ As needed (PRN) 03/27/18 0900    03/27/18 1000  insulin regular (Humulin R) 100 Units in sodium chloride 0.9% 100 mL infusion      -- IV Continuous 03/27/18 0900          ASSESSMENT and PLAN    * Acute respiratory failure with hypoxia    Per primary        On enteral nutrition    May adversely impact bg          Type 1 diabetes mellitus with hyperglycemia    BG goal 140-180  Change to transition gtt at 3.8 units/hr  POC q4  Low dose correction    If BG continues to drop while on insulin rate 0.1u/hr, will need to add D5 IVF to be able to continue continuous insulin gtt in pt with T1DM. Do not suspend gtt >1 hr, pt is T1DM.    Low c peptide with glc 164. Treat as type 1.   Neg MODE 2013, neg islet cell ab on this admission.   Cannot order insulin ab while on insulin, and ZnT8 unavailable in labs.          Hypothyroidism due to Hashimoto's thyroiditis    Abnormal TFTs upon admit.  Unclear if secondary to illness, but with evidence of iatrogenic hyperthyroidism in past while on above weight based dose.     Continue LT4 ng 125mcg daily (decreased from 150mcg)  Repeat TFTs in 4 weeks (due ~4/15)          Acute renal failure superimposed on stage 4 chronic kidney disease    Avoid insulin stacking          Heart transplanted    Per transplant  Avoid hypoglycemia              Corey Johnson MD  Endocrinology  [facility]

## 2018-03-27 NOTE — SUBJECTIVE & OBJECTIVE
"Interval HPI:   Overnight events: Remains intubated on tube feeds. Insulin dose less labile today.  Eating:   NPO  Hypoglycemia and intervention: No  Fever: No  TPN and/or TF: Yes  If yes, type of TF/TPN and rate: Glucerna 1.5 @ 50/hr    BP (!) 97/56 (BP Location: Right arm, Patient Position: Lying)   Pulse (!) 121   Temp 97.7 °F (36.5 °C) (Oral)   Resp (!) 28   Ht 5' 7" (1.702 m)   Wt 74.7 kg (164 lb 10.9 oz)   SpO2 100%   BMI 25.79 kg/m²     Labs Reviewed and Include      Recent Labs  Lab 03/27/18  0311   *  171*  171*   CALCIUM 8.1*  8.1*  8.1*   ALBUMIN 2.2*  2.2*  2.2*   PROT 6.0     142  142   K 4.7  4.7  4.7   CO2 24  24  24     108  108   BUN 10  10  10   CREATININE 0.8  0.8  0.8   ALKPHOS 209*   ALT 24   AST 35   BILITOT 1.1*     Lab Results   Component Value Date    WBC 3.33 (L) 03/27/2018    HGB 7.6 (L) 03/27/2018    HCT 23.7 (L) 03/27/2018    MCV 93 03/27/2018     (L) 03/27/2018     No results for input(s): TSH, FREET4 in the last 168 hours.  Lab Results   Component Value Date    HGBA1C 6.9 (H) 02/11/2018       Nutritional status:   Body mass index is 25.79 kg/m².  Lab Results   Component Value Date    ALBUMIN 2.2 (L) 03/27/2018    ALBUMIN 2.2 (L) 03/27/2018    ALBUMIN 2.2 (L) 03/27/2018     Lab Results   Component Value Date    PREALBUMIN 5 (L) 03/15/2018    PREALBUMIN 18 (L) 03/24/2015    PREALBUMIN 19 (L) 12/17/2014       Estimated Creatinine Clearance: 110.2 mL/min (based on SCr of 0.8 mg/dL).    Accu-Checks  Recent Labs      03/26/18   1438  03/26/18   1614  03/26/18   1813  03/26/18 2018 03/26/18   2210  03/27/18   0021  03/27/18   0315  03/27/18   0426  03/27/18   0607  03/27/18   0800   POCTGLUCOSE  225*  202*  213*  173*  165*  163*  197*  158*  161*  149*       Current Medications and/or Treatments Impacting Glycemic Control  Immunotherapy:  Immunosuppressants         Stop Route Frequency     tacrolimus capsule 1 mg      -- Oral 2 times " daily        Steroids:   Hormones     Start     Stop Route Frequency Ordered    03/26/18 0915  hydrocortisone sodium succinate injection 50 mg      -- IV Every 8 hours 03/26/18 0909        Pressors:    Autonomic Drugs     Start     Stop Route Frequency Ordered    03/25/18 1430  norepinephrine 32 mg in sodium chloride 0.9% 250 mL infusion     Question Answer Comment   Titrate by: (in mcg/kg/min) 0.01    Titrate interval: (in minutes) 10    Titrate to maintain: (MAP or SBP) MAP    Greater than: (in mmHg) 60    Maximum dose: (in mcg/kg/min) 0.05        -- IV Continuous 03/25/18 1419        Hyperglycemia/Diabetes Medications: Antihyperglycemics     Start     Stop Route Frequency Ordered    03/27/18 1000  insulin aspart U-100 pen 0-5 Units      -- SubQ As needed (PRN) 03/27/18 0900    03/27/18 1000  insulin regular (Humulin R) 100 Units in sodium chloride 0.9% 100 mL infusion      -- IV Continuous 03/27/18 0900

## 2018-03-27 NOTE — ASSESSMENT & PLAN NOTE
-S/P thoracentesis X 2, followed by VATS/pleurex cath placement (January 2018) with removal of pleurex (February 2018) and 3rd thoracentesis 2/14/18 with no significant findings on fluid removal. Moderate right pleural effusion stable by CXR 3/11/18 and chest CT 3/13/18  -Last paracentesis was in January. Abd US 3/12/18 confirmed ongoing ascites (not tense at all). Getting abdominal US today to eval amount of ascites per Thoracic Surgery request as they plan to place PEG on Wednesday (may have to IR NG placement instead)

## 2018-03-27 NOTE — PROGRESS NOTES
[facility]  Thoracic Surgery  Progress Note    Subjective:     History of Present Illness:  42 year old male with PMH of 42 y.o. male presents with PMH of OHTX in November 2014, Hashimoto's thyroiditis, T1DM, CKD and history of recurrent acsites and pleural effusions now admitted for respiratory failure from severe RSV. He is known to thoracic surgery as he underwent a right VATS drainage of effusion and Pleurx placement on 1/11/18 by Dr. James. Pleurx was removed in clinic on 2/7/18 due to minimal output and potential clogging. In February he was admitted for diarrhea, worsening nonproductive cough and near syncopal episodes. Most recently admitted on 3/13/18 after he was found to be febrile and hypoxic in endoscopy lab.  Intubated on 3/14/18 for hypoxemic and hypercapnic resp failure. Chest CT at that time showed small right pleural effusion and bilateral subsegmental multifocal consolidation with air bronchograms more extensive in left lung. Since then he has had a complicated hospital stay for presumed severe RSV including acute kidney failure on CRRT, intermittent pressor requirement, DIC and several failed SBTs.    Currently on FiO2 of 40% and 5 PEEP. Tachycardic but not on pressors. Has been on Vanc, Zosyn and  Posoconazole . Switched to Linezolid and Meropenum today.   On Tacrolimus and Hydrocortisone for immunosuppression, Cellcept held.        PSH significant for OHTx in 2014 and cholecystectomy. Prior to right pleurx placement, he was getting monthly paracenteses and thoracentesis.     Post-Op Info:  Procedure(s) (LRB):  ESOPHAGOGASTRODUODENOSCOPY (EGD) (N/A)  COLONOSCOPY (N/A)   14 Days Post-Op     Interval History: NAEON. Remains on 40% FiO2 and 5 PEEP. Tolerating tube feeds via NG. BM x2.     Medications:  Continuous Infusions:   sodium chloride 0.9%      dexmedetomidine (PRECEDEX) infusion Stopped (03/21/18 0750)    insulin (HUMAN R) infusion (adults)      norepinephrine  bitartrate-D5W Stopped (03/26/18 1320)     Scheduled Meds:   chlorhexidine  15 mL Mouth/Throat BID    docusate  100 mg Per NG tube BID    escitalopram oxalate  20 mg Oral Daily    famotidine (PF)  20 mg Intravenous BID    heparin (porcine)  5,000 Units Subcutaneous Q12H    hydrocortisone sodium succinate  50 mg Intravenous Q8H    levothyroxine  125 mcg Per NG tube Before breakfast    linezolid 600mg/300ml  600 mg Intravenous Q12H    meropenem (MERREM) IVPB  2 g Intravenous Q12H    metoclopramide HCl  5 mg Intravenous Q6H    polyethylene glycol  17 g Per NG tube Daily    posaconazole 200 mg/5ml (40 mg/ml)  200 mg Per NG tube TID WM    sodium chloride 0.9%  3 mL Intravenous Q8H    tacrolimus  1 mg Oral BID     PRN Meds:acetaminophen, dextrose 50%, dextrose 50%, glucagon (human recombinant), glucose, glucose, insulin aspart U-100, k phos di & mono-sod phos mono     Review of patient's allergies indicates:   Allergen Reactions    No known drug allergies      Objective:     Vital Signs (Most Recent):  Temp: 97.7 °F (36.5 °C) (03/27/18 0700)  Pulse: (!) 118 (03/27/18 0900)  Resp: (!) 24 (03/27/18 0900)  BP: 120/64 (03/27/18 0800)  SpO2: 100 % (03/27/18 0900) Vital Signs (24h Range):  Temp:  [97.7 °F (36.5 °C)-98.8 °F (37.1 °C)] 97.7 °F (36.5 °C)  Pulse:  [117-129] 118  Resp:  [0-35] 24  SpO2:  [100 %] 100 %  BP: ()/(53-67) 120/64  Arterial Line BP: ()/(43-81) 129/70     Intake/Output - Last 3 Shifts       03/25 0700 - 03/26 0659 03/26 0700 - 03/27 0659 03/27 0700 - 03/28 0659    I.V. (mL/kg) 522.8 (8.1) 3200.3 (42.8) 407.6 (5.5)    Blood 401      NG/GT 1320 1500 50    IV Piggyback 700 900     Total Intake(mL/kg) 2943.8 (45.5) 5600.3 (75) 457.6 (6.1)    Other 353 4501 909    Stool  1     Total Output 353 4502 909    Net +2590.8 +1098.3 -451.4           Stool Occurrence  1 x           SpO2: 100 %  O2 Device (Oxygen Therapy): ventilator    Physical Exam   Constitutional: He is intubated.   HENT:    Head: Normocephalic and atraumatic.   Neck: Normal range of motion. No tracheal deviation present. No thyromegaly present.   Cardiovascular: Regular rhythm and intact distal pulses.  Tachycardia present.    No murmur heard.  Pulmonary/Chest: He is intubated. He has decreased breath sounds in the right lower field and the left lower field.   Abdominal: Soft. Bowel sounds are normal. He exhibits no distension. There is no tenderness.   Musculoskeletal: He exhibits no edema.   Lymphadenopathy:     He has no cervical adenopathy.   Neurological: He is alert.       Significant Labs:  ABGs:   Recent Labs  Lab 03/27/18  0652   PH 7.424   PCO2 42.3   PO2 31*   HCO3 27.7   POCSATURATED 61*   BE 3     BMP:   Recent Labs  Lab 03/27/18  0311 03/27/18  0801   *  171*  171*  --      142  142  --    K 4.7  4.7  4.7  --      108  108  --    CO2 24  24  24  --    BUN 10  10  10  --    CREATININE 0.8  0.8  0.8  --    CALCIUM 8.1*  8.1*  8.1*  --    MG  --  1.9     CBC:   Recent Labs  Lab 03/27/18  0801   WBC 3.33*   RBC 2.55*   HGB 7.6*   HCT 23.7*   *   MCV 93   MCH 29.8   MCHC 32.1     CMP:   Recent Labs  Lab 03/27/18  0311   *  171*  171*   CALCIUM 8.1*  8.1*  8.1*   ALBUMIN 2.2*  2.2*  2.2*   PROT 6.0     142  142   K 4.7  4.7  4.7   CO2 24  24  24     108  108   BUN 10  10  10   CREATININE 0.8  0.8  0.8   ALKPHOS 209*   ALT 24   AST 35   BILITOT 1.1*     Coagulation:   Recent Labs  Lab 03/27/18  0801   INR 1.0       Significant Diagnostics:  CT: I have reviewed all pertinent results/findings within the past 24 hours  CXR: I have reviewed all pertinent results/findings within the past 24 hours    VTE Risk Mitigation         Ordered     heparin (porcine) injection 5,000 Units  Every 12 hours     Route:  Subcutaneous        03/23/18 1611        Assessment/Plan:     * Acute respiratory failure with hypoxia    42 y.o. male presents with PMH of OHTX in  November 2014, Hashimoto's thyroiditis, T1DM, CKD and history of recurrent acsites and pleural effusions now admitted for respiratory failure from severe RSV.    - Follow up on abdominal US and chest CT from today.   - Will plan for Trach/PEG in OR on Wednesday. However, PEG may be canceled if significant ascites is present. If so, consider IR [place] placement.  - NPO Tuesday at midnight. Please hold am heparin on Wednesday.             JOSE DANIEL Anderson  Thoracic Surgery  [facility]

## 2018-03-28 PROBLEM — G93.40 ACUTE ENCEPHALOPATHY: Status: ACTIVE | Noted: 2018-01-01

## 2018-03-28 PROBLEM — R23.9 ALTERATION IN SKIN INTEGRITY: Status: ACTIVE | Noted: 2018-01-01

## 2018-03-28 NOTE — PROGRESS NOTES
[facility]  Infectious Disease  Progress Note    Patient Name: Lv Crocker  MRN: [id number]  Admission Date: 3/11/2018  Length of Stay: 16 days  Attending Physician: Jose A Lloyd MD  Primary Care Provider: Philippe Mohr MD    Isolation Status: No active isolations  Assessment/Plan:      Hypotension    - 43 y/o man with a ICM, s/p heart transplant 11/18/14; previously seen by ID on this admission for RSV-A pneumonia/multifocal pneumonia  - ID called back today as patient had fever and was hypotensive overnight requiring vasopressors. Previous infectious work up done during admission reviewed.  - Had bronchoscopy and BAL performed on 3/14/18 with cultures NGTD   - B/C 3/25/18 NGTD  - respiratory culture 3/25/18 NGTD  - CXR 3/26/18: continued right pleural effusion.  Diffuse increase in the pulmonary vascular interstitial and alveolar markings. Focal opacity developing at the left base.  - switched antibiotic therapy to meropenem and linezolid; continue posaconazole for now  - Chest ST with following findings: previously identified areas of consolidation noted in the left lung on prior chest CT on 03/13/2018 appear to have cleared with interval development of multiple new ground-glass opacities in the left upper and lower lobes as well as the right upper lobe, suggestive of edema versus pneumonitis. Large rounded opacity in the right lower lobe with associated air bronchogram that appears to have increased in size since prior examination on 07/19/2016 but appears similar to 03/13/2018 and 02/14/2018. Findings are suggestive of rounded atelectasis in the setting of chronic right-sided pleural effusion  - Improved since last evaluation, off vasopressors scheduled for tracheostomy and PEG today   - As per discussion with team will have bronchoscopy and thoracentesis of right chronic effusion today   - Recommend to continue meropenem and linezolid for 7-10 day course   - Can D/C  posaconazole   - will follow new cultures and thoracentesis results     Recommendations   - D/C posaconazole  - continue linezolid and meropenem for 7-10 day course   - will monitor progress of new culture results             Anticipated Disposition:     Thank you for your consult. I will follow-up with patient. Please contact us if you have any additional questions.    Ariela Alejandra MD  Infectious Disease  [facility]    Subjective:     Principal Problem:Acute respiratory failure with hypoxia    HPI: 41yo man w/a history of DM1, Hashimoto's thyroiditis, and ICM (s/p OHT 11/18/2014, CMV D+/R+, steroid induction, on maintenance tacro/MMF/pred; c/b early hemorrhagic tamponade requiring washout, delayed closure, and pericardial window and subsequent AF w/RVR, and CACHORRO; late course c/b ABMR 4/2015 s/p rituxan, PLEX, and IVIG; subsequent C.diff/CMV reactivation, restrictive cardiomyopathy with recurrent pleural effusions s/p VATS/pleurex catheter 1/2018 with removal 2/2018 and ascites requiring repeated LVP, and CKD) who was admitted on 3/11/2018 with ~3wks of worsening profuse non-bloody diarrhea, associated cramping abdominal pain, N/V, and a week of acute on chronic cough, SOB, hypoxia requiring intubation, and fevers and was found to have multifocal pneumonia on CT chest due to RSV-A pneumonia with suspected superimposed bacterial pneumonia. He remains critically ill with ongoing pressor requirement, hypoxic RF, CRRT requirement, and minimal hepatitis.  Interval History: Patient scheduled for tracheostomy and PEG placement today, condition unchanged     Review of Systems   Unable to perform ROS: Intubated     Objective:     Vital Signs (Most Recent):  Temp: 98.6 °F (37 °C) (03/28/18 1100)  Pulse: (!) 120 (03/28/18 1115)  Resp: (!) 23 (03/28/18 1100)  BP: 110/63 (03/28/18 1000)  SpO2: 99 % (03/28/18 1115) Vital Signs (24h Range):  Temp:  [98.1 °F (36.7 °C)-98.6 °F (37 °C)] 98.6 °F (37  °C)  Pulse:  [115-124] 120  Resp:  [5-28] 23  SpO2:  [97 %-100 %] 99 %  BP: ()/(54-74) 110/63  Arterial Line BP: ()/(43-74) 123/66     Weight: 73.2 kg (161 lb 6 oz)  Body mass index is 25.28 kg/m².    Estimated Creatinine Clearance: 146.9 mL/min (based on SCr of 0.6 mg/dL).    Physical Exam   Constitutional: He is sedated and intubated.   Eyes: No scleral icterus.   Cardiovascular: Normal rate and regular rhythm.    Pulmonary/Chest: He is intubated. He has no wheezes. He has no rales.   Abdominal: Soft. He exhibits no distension and no mass. There is no rebound.   Musculoskeletal: He exhibits no edema.   Lymphadenopathy:     He has no cervical adenopathy.   Neurological: He is alert.   Skin: No rash noted. No erythema.       Significant Labs:   Blood Culture:   Recent Labs  Lab 03/18/18  0618 03/18/18  0748 03/21/18  0815 03/25/18  1554 03/25/18  1606   LABBLOO Gram stain aer bottle: Gram positive cocci in clusters resembling Staph   Results called to and read back by:Omid Cody RN 03/19/2018  10:55  COAGULASE-NEGATIVE STAPHYLOCOCCUS SPECIESOrganism is a probable contaminant No growth after 5 days. No growth after 5 days. No Growth to date  No Growth to date  No Growth to date No Growth to date  No Growth to date  No Growth to date     CBC:   Recent Labs  Lab 03/27/18 2018 03/28/18  0357 03/28/18  0813   WBC 3.56* 5.05 4.02   HGB 6.7* 7.7* 6.7*   HCT 22.1* 24.2* 21.6*    204 183     CMP:   Recent Labs  Lab 03/27/18  0311 03/27/18  1502 03/27/18  2157 03/28/18  0356 03/28/18  0813     142  142 143 143  143  143 143  143  143  --    K 4.7  4.7  4.7 4.3 4.7  4.7  4.7 4.8  4.8  4.8 4.7     108  108 106 107  107  107 108  108  108  --    CO2 24  24  24 27 25  25  25 24  24  24  --    *  171*  171* 229* 130*  130*  130* 118*  118*  118*  --    BUN 10  10  10 13 12  12  12 7  7  7  --    CREATININE 0.8  0.8  0.8 0.8 0.7  0.7  0.7 0.6   0.6  0.6  --    CALCIUM 8.1*  8.1*  8.1* 8.2* 8.3*  8.3*  8.3* 8.3*  8.3*  8.3*  --    PROT 6.0  --   --  6.2  --    ALBUMIN 2.2*  2.2*  2.2* 2.1* 2.2*  2.2*  2.2* 2.4*  2.4*  2.4*  --    BILITOT 1.1*  --   --  1.2*  --    ALKPHOS 209*  --   --  231*  --    AST 35  --   --  42*  --    ALT 24  --   --  29  --    ANIONGAP 10  10  10 10 11  11  11 11  11  11  --    EGFRNONAA >60.0  >60.0  >60.0 >60.0 >60.0  >60.0  >60.0 >60.0  >60.0  >60.0  --      Microbiology Results (last 7 days)     Procedure Component Value Units Date/Time    Fungus culture [[id number]] Collected:  03/14/18 1137    Order Status:  Completed Specimen:  Bronchial Wash from Amniotic Fluid Updated:  03/28/18 0933     Fungus (Mycology) Culture Culture in progress     Fungus (Mycology) Culture No fungus isolated after 2 weeks    Blood culture [[id number]] Collected:  03/25/18 1554    Order Status:  Completed Specimen:  Blood from Peripheral, Upper Arm, Right Updated:  03/27/18 1812     Blood Culture, Routine No Growth to date     Blood Culture, Routine No Growth to date     Blood Culture, Routine No Growth to date    Blood culture [[id number]] Collected:  03/25/18 1606    Order Status:  Completed Specimen:  Blood from Peripheral, Forearm, Left Updated:  03/27/18 1812     Blood Culture, Routine No Growth to date     Blood Culture, Routine No Growth to date     Blood Culture, Routine No Growth to date    Culture, Respiratory with Gram Stain [[id number]] Collected:  03/25/18 1534    Order Status:  Completed Specimen:  Respiratory from Sputum, Induced Updated:  03/27/18 0739     Respiratory Culture Normal respiratory hank     Gram Stain (Respiratory) <10 epithelial cells per low power field.     Gram Stain (Respiratory) Rare WBC's     Gram Stain (Respiratory) Rare Gram positive cocci    Blood culture [[id number]] Collected:  03/21/18 0815    Order Status:  Completed Specimen:  Blood from Peripheral, Forearm, Left Updated:  03/26/18 1012      Blood Culture, Routine No growth after 5 days.    Urine culture [[id number]]     Order Status:  Canceled Specimen:  Urine     Blood culture [[id number]] Collected:  03/18/18 0748    Order Status:  Completed Specimen:  Blood from Peripheral, Hand, Right Updated:  03/23/18 1012     Blood Culture, Routine No growth after 5 days.          Significant Imaging: I have reviewed all pertinent imaging results/findings within the past 24 hours.

## 2018-03-28 NOTE — PROGRESS NOTES
"[facility]  Endocrinology  Progress Note    Admit Date: 3/11/2018     Reason for Consult: abnormal TFTs    Surgical Procedure and Date: s/p heart transplant 2014     Diabetes diagnosis year: 5yo (T1DM)     Home Diabetes Medications:    Levemir 15u qHS  Novolog 4u AC     How often checking glucose at home? 4 times daily   BG readings on regimen: 150-200s  Hypoglycemia on the regimen?  Yes, rarely - treats appropriately (light snack, glucose tab)  Missed doses on regimen?  No        Diabetes Complications include:     Hyperglycemia, Hypoglycemia  and Diabetic chronic kidney disease          Complicating diabetes co morbidities:   N/A     HPI:   Patient is a 42 y.o. male with a diagnosis of T1DM, HTN, hypothyroidism, s/p heart transplant.  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 He required serial paracentesis and thoracentesis until he underwent a VATS with pleurX catheter placement on 1/11/2018 and removed 2/7/18.       Presented to hospital secondary to diarrhea and cough.  Upon initial labs, TSH was decreased (0.101) and free T4 1.33.  Endocrinology consulted to assist in management of these labs.  He was last seen in clinic by Kamala Vázquez NP 11/2017.   Previous hospitalization, endocrine consulted for same problem.  During that visit, recommended decreasing LT4 to 125mcg daily. Unfortunately, patient was discharged on previous dose of 150mcg daily.     Interval HPI:   Overnight events: Now NPO for procedure. Switched to intensive insulin drip with q1.  Hypoglycemia and intervention: No  Fever: No  TPN and/or TF: No  If yes, type of TF/TPN and rate: TF held for procedure.    /63 (BP Location: Right arm, Patient Position: Lying)   Pulse (!) 120   Temp 98.6 °F (37 °C) (Axillary)   Resp (!) 23   Ht 5' 7" (1.702 m)   Wt 73.2 kg (161 lb 6 oz)   SpO2 99%   BMI 25.28 kg/m²   "     Labs Reviewed and Include      Recent Labs  Lab 03/28/18  0356 03/28/18  0813   *  118*  118*  --    CALCIUM 8.3*  8.3*  8.3*  --    ALBUMIN 2.4*  2.4*  2.4*  --    PROT 6.2  --      143  143  --    K 4.8  4.8  4.8 4.7   CO2 24  24  24  --      108  108  --    BUN 7  7  7  --    CREATININE 0.6  0.6  0.6  --    ALKPHOS 231*  --    ALT 29  --    AST 42*  --    BILITOT 1.2*  --      Lab Results   Component Value Date    WBC 4.02 03/28/2018    HGB 6.7 (L) 03/28/2018    HCT 21.6 (L) 03/28/2018    MCV 96 03/28/2018     03/28/2018     No results for input(s): TSH, FREET4 in the last 168 hours.  Lab Results   Component Value Date    HGBA1C 6.9 (H) 02/11/2018       Nutritional status:   Body mass index is 25.28 kg/m².  Lab Results   Component Value Date    ALBUMIN 2.4 (L) 03/28/2018    ALBUMIN 2.4 (L) 03/28/2018    ALBUMIN 2.4 (L) 03/28/2018     Lab Results   Component Value Date    PREALBUMIN 5 (L) 03/15/2018    PREALBUMIN 18 (L) 03/24/2015    PREALBUMIN 19 (L) 12/17/2014       Estimated Creatinine Clearance: 146.9 mL/min (based on SCr of 0.6 mg/dL).    Accu-Checks  Recent Labs      03/28/18   0009  03/28/18   0122  03/28/18   0210  03/28/18   0402  03/28/18   0516  03/28/18   0619  03/28/18   0757  03/28/18   0911  03/28/18   1015  03/28/18   1057   POCTGLUCOSE  118*  114*  122*  129*  121*  247*  256*  212*  242*  257*       Current Medications and/or Treatments Impacting Glycemic Control  Immunotherapy:  Immunosuppressants         Stop Route Frequency     tacrolimus capsule 1 mg      -- Oral 2 times daily        Steroids:   Hormones     Start     Stop Route Frequency Ordered    03/26/18 0915  hydrocortisone sodium succinate injection 50 mg      -- IV Every 8 hours 03/26/18 0909        Pressors:    Autonomic Drugs     Start     Stop Route Frequency Ordered    03/25/18 1430  norepinephrine 32 mg in sodium chloride 0.9% 250 mL infusion     Question Answer Comment   Titrate by:  (in mcg/kg/min) 0.01    Titrate interval: (in minutes) 10    Titrate to maintain: (MAP or SBP) MAP    Greater than: (in mmHg) 60    Maximum dose: (in mcg/kg/min) 0.05        -- IV Continuous 03/25/18 1419        Hyperglycemia/Diabetes Medications: Antihyperglycemics     Start     Stop Route Frequency Ordered    03/28/18 0245  insulin regular (Humulin R) 100 Units in sodium chloride 0.9% 100 mL infusion     Question:  Insulin Rate Adjustment (DO NOT MODIFY ANSWER)  Answer:  [email or web address]    -- IV Continuous 03/28/18 0144    03/27/18 1000  insulin aspart U-100 pen 0-5 Units      -- SubQ As needed (PRN) 03/27/18 0900          ASSESSMENT and PLAN    * Acute respiratory failure with hypoxia    Per primary        On enteral nutrition    May adversely impact bg          Type 1 diabetes mellitus with stage 3 chronic kidney disease      BG goal 140-180  Lab Results   Component Value Date    HGBA1C 6.9 (H) 02/11/2018     insulin gtt cannot be discontinued as he is a type 1 diabetic  Recheck c-peptide low,anti-insulin Ab pending    Discharge Recommendations:  TBD.        Type 1 diabetes mellitus with hyperglycemia    BG goal 140-180  Continue intensive with q1 POC until after procedure. Will change to transition when insulin doses stabilize and TF restarted.    If BG continues to drop while on insulin rate 0.1u/hr, will need to add D5 IVF to be able to continue continuous insulin gtt in pt with T1DM. Do not suspend gtt >1 hr, pt is T1DM.    Low c peptide with glc 164. Treat as type 1.   Neg MODE 2013, neg islet cell ab on this admission.   Cannot order insulin ab while on insulin, and ZnT8 unavailable in labs.          Hypothyroidism due to Hashimoto's thyroiditis    Abnormal TFTs upon admit.  Unclear if secondary to illness, but with evidence of iatrogenic hyperthyroidism in past while on above weight based dose.     Continue LT4 ng 125mcg daily (decreased from  150mcg)  Repeat TFTs in 4 weeks (due ~4/15)          Acute renal failure superimposed on stage 4 chronic kidney disease    Avoid insulin stacking          Heart transplanted    Per transplant  Avoid hypoglycemia              Corey Johnson MD  Endocrinology  [facility]

## 2018-03-28 NOTE — PT/OT/SLP PROGRESS
Physical Therapy Treatment    Patient Name:  Lv Crocker   MRN:  [id number]  Co-treat with OT  RT present  RN present   Recommendations:     Discharge Recommendations:   (TBD)   Discharge Equipment Recommendations:  (TBD)   Barriers to discharge: Inaccessible home ( 7 KIARRA to enter) and Decreased caregiver support at current functional level     Assessment:     Lv Crocker is a 42 y.o. male admitted with a medical diagnosis of Acute respiratory failure with hypoxia.  He presents with the following impairments/functional limitations:  weakness, impaired endurance, impaired self care skills, impaired functional mobilty, impaired balance, impaired cognition, decreased upper extremity function, decreased lower extremity function, decreased ROM, impaired cardiopulmonary response to activity. Pt more responsive and alert this session. Pt with increased ability to hold head up but cannot obtain neutral actively. Pt with new L drop foot, which family reported started yesterday. Pt also with L ankle clonus. Pt continues with poor sitting balance. Pt fatigues quickly.     Rehab Prognosis:  good; patient would benefit from acute skilled PT services to address these deficits and reach maximum level of function.      Recent Surgery: Procedure(s) (LRB):  ESOPHAGOGASTRODUODENOSCOPY (EGD) (N/A)  COLONOSCOPY (N/A) 15 Days Post-Op    Plan:     During this hospitalization, patient to be seen 5 x/week to address the above listed problems via gait training, therapeutic activities, therapeutic exercises, neuromuscular re-education  · Plan of Care Expires:  04/23/18   Plan of Care Reviewed with: patient, family    Subjective     Communicated with RN prior to session and wife present in room.  Patient found in bed upon PT entry to room, agreeable to treatment.      Chief Complaint: unable to assess  Patient comments/goals: family wants pt to get better   Pain/Comfort:  · Pain Rating 1: 0/10  · Pain Rating  "Post-Intervention 1: 0/10    Patients cultural, spiritual, Yazdanism conflicts given the current situation: non reported     Objective:     Patient found with: pulse ox (continuous), telemetry, blood pressure cuff, ventilator, central line, peripheral IV, NG tube, arterial line     General Precautions: Standard, fall, respiratory   Orthopedic Precautions:N/A   Braces: N/A     Functional Mobility:  · Bed Mobility:     · Scooting: total assistance and of 2 persons  · Supine to Sit: total assistance and of 2 persons  · Sit to Supine: total assistance and of 2 persons  · Transfers:  Not performed   · Gait: not performed   · Balance:   · Static sit: total A  · Dynamic sit: total A      AM-PAC 6 CLICK MOBILITY  Turning over in bed (including adjusting bedclothes, sheets and blankets)?: 2  Sitting down on and standing up from a chair with arms (e.g., wheelchair, bedside commode, etc.): 1  Moving from lying on back to sitting on the side of the bed?: 2  Moving to and from a bed to a chair (including a wheelchair)?: 1  Need to walk in hospital room?: 1  Climbing 3-5 steps with a railing?: 1  Total Score: 8       Therapeutic Activities and Exercises:  Educated pt on PT POC    Sitting EOB x 8 minutes with total A to increase tolerance to OOB activity and to create optimal positioning for lung expansion   · B UE therex with OT  · Pt nodded "yes" when asked if ready to return to bed    B LE PROM/AAROM in supine  Ankle pumps x 10 reps  Hip abduction/adduction x 10 reps  Heel slides x 10 reps    Patient left HOB elevated with all lines intact, call button in reach, RN notified and wife present..    GOALS:    Physical Therapy Goals        Problem: Physical Therapy Goal    Goal Priority Disciplines Outcome Goal Variances Interventions   Physical Therapy Goal     PT/OT, PT Ongoing (interventions implemented as appropriate)     Description:  Goals to be met by: 4/21/18     Patient will increase functional independence with mobility " by performin. Supine to sit with Moderate Assistance - not met  2. Sit to supine with Moderate Assistance - not met  3. Rolling to Left and Right with Minimal Assistance. - not met  4. Sit to stand transfer with Moderate Assistance - not met  5. Bed to chair transfer with Maximum Assistance - not met  6. Gait  x 20 feet with Minimal Assistance. - not met                       Time Tracking:     PT Received On: 18  PT Start Time: 1250     PT Stop Time: 1315  PT Total Time (min): 25 min     Billable Minutes: Therapeutic Activity 15 and Therapeutic Exercise 8    Treatment Type: Treatment  PT/PTA: PT     PTA Visit Number: 0     Teresa Dudley PT, DPT  3/28/2018  [phone]

## 2018-03-28 NOTE — SUBJECTIVE & OBJECTIVE
"Interval History:   No events overnight. Net negative 1L yesterday. PEG postponed; planning for bronch and trach at 10:30am this morning. Hourly intake 7cc/hr with insulin gtt. Minimal vent settings. CXR still with "moderate edema".     Review of patient's allergies indicates:   Allergen Reactions    No known drug allergies      Current Facility-Administered Medications   Medication Frequency    0.9%  NaCl infusion (CRRT USE ONLY) Continuous    0.9%  NaCl infusion (CRRT USE ONLY) Continuous    acetaminophen oral solution 650 mg Q4H PRN    chlorhexidine 0.12 % solution 15 mL BID    dexmedetomidine (PRECEDEX) 400mcg/100mL 0.9% NaCL infusion Continuous    dextrose 50% injection 12.5 g PRN    dextrose 50% injection 12.5 g PRN    dextrose 50% injection 25 g PRN    dextrose 50% injection 25 g PRN    docusate 50 mg/5 mL liquid 100 mg BID    escitalopram oxalate tablet 20 mg Daily    famotidine (PF) injection 20 mg BID    glucagon (human recombinant) injection 1 mg PRN    glucose chewable tablet 16 g PRN    glucose chewable tablet 24 g PRN    heparin (porcine) injection 5,000 Units Q12H    hydrocortisone sodium succinate injection 50 mg Q8H    insulin aspart U-100 pen 0-5 Units PRN    insulin regular (Humulin R) 100 Units in sodium chloride 0.9% 100 mL infusion Continuous    k phos di & mono-sod phos mono 250 mg tablet 2 tablet Q4H PRN    levothyroxine tablet 125 mcg Before breakfast    linezolid 600mg/300ml IVPB 600 mg Q12H    meropenem (MERREM) 2 g in sodium chloride 0.9% 100 mL IVPB Q12H    metoclopramide HCl injection 5 mg Q6H    norepinephrine 32 mg in sodium chloride 0.9% 250 mL infusion Continuous    polyethylene glycol packet 17 g Daily    posaconazole 200 mg/5ml (40 mg/ml) suspension 200 mg TID WM    sodium chloride 0.9% flush 3 mL Q8H    tacrolimus capsule 1 mg BID       Objective:     Vital Signs (Most Recent):  Temp: 98.2 °F (36.8 °C) (03/28/18 0800)  Pulse: (!) 122 (03/28/18 " 0927)  Resp: (!) 21 (03/28/18 0815)  BP: 112/74 (03/28/18 0800)  SpO2: 97 % (03/28/18 0927)  O2 Device (Oxygen Therapy): ventilator (03/28/18 0927) Vital Signs (24h Range):  Temp:  [97.7 °F (36.5 °C)-98.2 °F (36.8 °C)] 98.2 °F (36.8 °C)  Pulse:  [115-124] 122  Resp:  [17-28] 21  SpO2:  [97 %-100 %] 97 %  BP: ()/(54-74) 112/74  Arterial Line BP: ()/(43-74) 130/70     Weight: 73.2 kg (161 lb 6 oz) (03/28/18 0400)  Body mass index is 25.28 kg/m².  Body surface area is 1.86 meters squared.    I/O last 3 completed shifts:  In: 8422.1 [I.V.:5432.1; NG/GT:1890; IV Piggyback:1100]  Out: 9384 [Other:9383; Stool:1]    Physical Exam   Constitutional: He appears well-developed and well-nourished.   HENT:   Head: Normocephalic and atraumatic.   Eyes: Conjunctivae are normal. No scleral icterus.   Cardiovascular: Regular rhythm.  Tachycardia present.    Pulmonary/Chest: He has no wheezes. He has no rales.   Abdominal: Soft. He exhibits no distension.   Musculoskeletal: He exhibits edema (dependent). He exhibits no deformity.   Skin: Skin is warm and dry. He is not diaphoretic.       Significant Labs:  ABGs:   Recent Labs  Lab 03/28/18 0815   PH 7.430   PCO2 34.9*   HCO3 23.2*   POCSATURATED 98   BE -1     CBC:   Recent Labs  Lab 03/28/18 0813   WBC 4.02   RBC 2.24*   HGB 6.7*   HCT 21.6*      MCV 96   MCH 29.9   MCHC 31.0*     CMP:   Recent Labs  Lab 03/28/18  0356 03/28/18  0813   *  118*  118*  --    CALCIUM 8.3*  8.3*  8.3*  --    ALBUMIN 2.4*  2.4*  2.4*  --    PROT 6.2  --      143  143  --    K 4.8  4.8  4.8 4.7   CO2 24  24  24  --      108  108  --    BUN 7  7  7  --    CREATININE 0.6  0.6  0.6  --    ALKPHOS 231*  --    ALT 29  --    AST 42*  --    BILITOT 1.2*  --      All labs within the past 24 hours have been reviewed.     Significant Imaging:  Labs: Reviewed  X-Ray: Reviewed

## 2018-03-28 NOTE — PLAN OF CARE
Problem: Occupational Therapy Goal  Goal: Occupational Therapy Goal  Goals to be met by: 4/7/2018    Pt will follow 75% of motor commands with <2 verbal cues for repetition of task to maximize engagement in grooming task.  Pt will complete feeding with mod A.   Pt will complete supine with HOB slightly elevated to seated at EOB with mod A in prep for seated grooming task.  Pt will engage in BUE exercises in orders to increase strength to 3+/5 in BUE's to increase engagement in seated grooming task  Pt will sit at EOB for approx 10 minutes with mod A and unilateral UE support to complete grooming task  Pt will complete sit>stand from EOB with bed in lowest position with mod A in prep for transfer to [facility]   Goals remain appropriate.

## 2018-03-28 NOTE — ASSESSMENT & PLAN NOTE
-S/P thoracentesis X 2, followed by VATS/pleurex cath placement (January 2018) with removal of pleurex (February 2018) and 3rd thoracentesis 2/14/18 with no significant findings on fluid removal. Moderate right pleural effusion stable by CXR 3/11/18 and chest CT 3/13/18. CT 3/27/18 as above  -Last paracentesis was in January. Abd US 3/12/18 confirmed ongoing ascites (not tense at all). Abdominal US 3/26 showed small patchy areas of ascites, so Thoracic Surgery is not going to place PEG. Likely have to consult IR for feeding tube placement

## 2018-03-28 NOTE — PROGRESS NOTES
Dialysis RN at bedside to change equipment and restart crrt , per primary RN, pt is scheduled for procedure @ 10:00, Ok per Dr Melgoza to hold off on restarting crrt for now till pt returns from procedure.

## 2018-03-28 NOTE — SUBJECTIVE & OBJECTIVE
Interval History: NAEON. Abdominal US and chest CT show widespread ascites. Remains stable on 40% FiO2 and 5 PEEP.     Medications:  Continuous Infusions:   sodium chloride 0.9% 200 mL/hr at 03/28/18 0300    dexmedetomidine (PRECEDEX) infusion Stopped (03/21/18 0750)    insulin (HUMAN R) infusion (adults) 1.7 Units/hr (03/28/18 0759)    norepinephrine bitartrate-D5W Stopped (03/26/18 1320)     Scheduled Meds:   chlorhexidine  15 mL Mouth/Throat BID    docusate  100 mg Per NG tube BID    escitalopram oxalate  20 mg Oral Daily    famotidine (PF)  20 mg Intravenous BID    heparin (porcine)  5,000 Units Subcutaneous Q12H    hydrocortisone sodium succinate  50 mg Intravenous Q8H    levothyroxine  125 mcg Per NG tube Before breakfast    linezolid 600mg/300ml  600 mg Intravenous Q12H    meropenem (MERREM) IVPB  2 g Intravenous Q12H    metoclopramide HCl  5 mg Intravenous Q6H    polyethylene glycol  17 g Per NG tube Daily    posaconazole 200 mg/5ml (40 mg/ml)  200 mg Per NG tube TID WM    sodium chloride 0.9%  3 mL Intravenous Q8H    tacrolimus  1 mg Oral BID     PRN Meds:acetaminophen, dextrose 50%, dextrose 50%, dextrose 50%, dextrose 50%, glucagon (human recombinant), glucose, glucose, insulin aspart U-100, k phos di & mono-sod phos mono     Review of patient's allergies indicates:   Allergen Reactions    No known drug allergies      Objective:     Vital Signs (Most Recent):  Temp: 98.2 °F (36.8 °C) (03/27/18 1907)  Pulse: (!) 121 (03/28/18 0719)  Resp: 20 (03/28/18 0600)  BP: 106/63 (03/28/18 0600)  SpO2: 100 % (03/28/18 0719) Vital Signs (24h Range):  Temp:  [97.7 °F (36.5 °C)-98.2 °F (36.8 °C)] 98.2 °F (36.8 °C)  Pulse:  [115-124] 121  Resp:  [17-28] 20  SpO2:  [100 %] 100 %  BP: ()/(54-72) 106/63  Arterial Line BP: ()/(43-74) 117/62     Intake/Output - Last 3 Shifts       03/26 0700 - 03/27 0659 03/27 0700 - 03/28 0659 03/28 0700 - 03/29 0659    I.V. (mL/kg) 3200.3 (42.8) 3183.3 (43.5)      Blood       NG/GT 1500 1230     IV Piggyback 900 700     Total Intake(mL/kg) 5600.3 (75) 5113.3 (69.9)     Other 4501 6247     Stool 1      Total Output 4502 6247      Net +1098.3 -1133.7             Stool Occurrence 1 x            SpO2: 100 %  O2 Device (Oxygen Therapy): ventilator    Physical Exam   Constitutional: He is intubated.   HENT:   Head: Normocephalic and atraumatic.   Neck: Normal range of motion. No tracheal deviation present. No thyromegaly present.   Cardiovascular: Regular rhythm and intact distal pulses.  Tachycardia present.    No murmur heard.  Pulmonary/Chest: He is intubated. He has decreased breath sounds in the right lower field and the left lower field.   Abdominal: Soft. Bowel sounds are normal. He exhibits no distension. There is no tenderness.   Musculoskeletal: He exhibits no edema.   Lymphadenopathy:     He has no cervical adenopathy.   Neurological: He is alert.       Significant Labs:  ABGs:   Recent Labs  Lab 03/28/18  0404   PH 7.404   PCO2 43.6   PO2 31*   HCO3 27.2   POCSATURATED 60*   BE 2     BMP:   Recent Labs  Lab 03/28/18  0009 03/28/18  0356   GLU  --  118*  118*  118*   NA  --  143  143  143   K  --  4.8  4.8  4.8   CL  --  108  108  108   CO2  --  24  24  24   BUN  --  7  7  7   CREATININE  --  0.6  0.6  0.6   CALCIUM  --  8.3*  8.3*  8.3*   MG 2.0  2.0  --      CBC:   Recent Labs  Lab 03/28/18  0357   WBC 5.05   RBC 2.51*   HGB 7.7*   HCT 24.2*      MCV 96   MCH 30.7   MCHC 31.8*     CMP:   Recent Labs  Lab 03/28/18  0356   *  118*  118*   CALCIUM 8.3*  8.3*  8.3*   ALBUMIN 2.4*  2.4*  2.4*   PROT 6.2     143  143   K 4.8  4.8  4.8   CO2 24  24  24     108  108   BUN 7  7  7   CREATININE 0.6  0.6  0.6   ALKPHOS 231*   ALT 29   AST 42*   BILITOT 1.2*     Coagulation:   Recent Labs  Lab 03/27/18  0801   INR 1.0       Significant Diagnostics:  CT: I have reviewed all pertinent results/findings within the past 24  hours  CXR: I have reviewed all pertinent results/findings within the past 24 hours    VTE Risk Mitigation         Ordered     heparin (porcine) injection 5,000 Units  Every 12 hours     Route:  Subcutaneous        03/23/18 4236

## 2018-03-28 NOTE — ASSESSMENT & PLAN NOTE
BG goal 140-180  Continue intensive with q1 POC until after procedure. Will change to transition when insulin doses stabilize and TF restarted.    If BG continues to drop while on insulin rate 0.1u/hr, will need to add D5 IVF to be able to continue continuous insulin gtt in pt with T1DM. Do not suspend gtt >1 hr, pt is T1DM.    Low c peptide with glc 164. Treat as type 1.   Neg MODE 2013, neg islet cell ab on this admission.   Cannot order insulin ab while on insulin, and ZnT8 unavailable in labs.

## 2018-03-28 NOTE — SUBJECTIVE & OBJECTIVE
Interval History: Able to wiggle fingers to command today. Tolerating  without hypotension    Continuous Infusions:   sodium chloride 0.9%      dexmedetomidine (PRECEDEX) infusion Stopped (03/21/18 0750)    insulin (HUMAN R) infusion (adults) 1.5 Units/hr (03/28/18 1017)    norepinephrine bitartrate-D5W Stopped (03/26/18 1320)     Scheduled Meds:   chlorhexidine  15 mL Mouth/Throat BID    docusate  100 mg Per NG tube BID    escitalopram oxalate  20 mg Oral Daily    famotidine (PF)  20 mg Intravenous BID    heparin (porcine)  5,000 Units Subcutaneous Q12H    hydrocortisone sodium succinate  50 mg Intravenous Q8H    levothyroxine  125 mcg Per NG tube Before breakfast    linezolid 600mg/300ml  600 mg Intravenous Q12H    meropenem (MERREM) IVPB  2 g Intravenous Q12H    metoclopramide HCl  5 mg Intravenous Q6H    polyethylene glycol  17 g Per NG tube Daily    posaconazole 200 mg/5ml (40 mg/ml)  200 mg Per NG tube TID WM    sodium chloride 0.9%  3 mL Intravenous Q8H    tacrolimus  1 mg Oral BID     PRN Meds:acetaminophen, dextrose 50%, dextrose 50%, dextrose 50%, dextrose 50%, glucagon (human recombinant), glucose, glucose, insulin aspart U-100, k phos di & mono-sod phos mono    Review of patient's allergies indicates:   Allergen Reactions    No known drug allergies      Objective:     Vital Signs (Most Recent):  Temp: 98.2 °F (36.8 °C) (03/28/18 0800)  Pulse: (!) 122 (03/28/18 0927)  Resp: (!) 21 (03/28/18 0815)  BP: 112/74 (03/28/18 0800)  SpO2: 97 % (03/28/18 0927) Vital Signs (24h Range):  Temp:  [97.7 °F (36.5 °C)-98.2 °F (36.8 °C)] 98.2 °F (36.8 °C)  Pulse:  [115-124] 122  Resp:  [17-28] 21  SpO2:  [97 %-100 %] 97 %  BP: ()/(54-74) 112/74  Arterial Line BP: ()/(43-74) 130/70     Patient Vitals for the past 72 hrs (Last 3 readings):   Weight   03/28/18 0400 73.2 kg (161 lb 6 oz)   03/27/18 1400 74.7 kg (164 lb 10.9 oz)   03/27/18 0609 74.7 kg (164 lb 10.9 oz)     Body mass index  is 25.28 kg/m².      Intake/Output Summary (Last 24 hours) at 03/28/18 1021  Last data filed at 03/28/18 0800   Gross per 24 hour   Intake          3975.67 ml   Output             5009 ml   Net         -1033.33 ml       Hemodynamic Parameters:       Telemetry: ST    Physical Exam   Constitutional: He appears well-developed and well-nourished.   Appears ill   HENT:   Head: Normocephalic and atraumatic.   Eyes: Conjunctivae are normal. Pupils are equal, round, and reactive to light.   Neck: No thyromegaly present.   RIJ trialysis   Cardiovascular: Regular rhythm.    Tachycardic   Pulmonary/Chest:   Intubated and ventilated  Breath sounds are slightly decreased right base. Coarse bilaterally   Abdominal: Soft. Bowel sounds are normal.   Musculoskeletal:   Trace - 1+ gen edema   Neurological:   Intubated. Arouses to voice and nods head appropriately   Skin: Skin is warm and dry. Capillary refill takes less than 2 seconds. There is pallor.       Significant Labs:  CBC:    Recent Labs  Lab 03/27/18  2018 03/28/18  0357 03/28/18  0813   WBC 3.56* 5.05 4.02   RBC 2.30* 2.51* 2.24*   HGB 6.7* 7.7* 6.7*   HCT 22.1* 24.2* 21.6*    204 183   MCV 96 96 96   MCH 29.1 30.7 29.9   MCHC 30.3* 31.8* 31.0*     BNP:  No results for input(s): BNP in the last 168 hours.    Invalid input(s): BNPTRIAGELBLO  CMP:    Recent Labs  Lab 03/26/18  0404  03/27/18  0311 03/27/18  1502 03/27/18  2157 03/28/18  0356 03/28/18  0813   *  215*  < > 171*  171*  171* 229* 130*  130*  130* 118*  118*  118*  --    CALCIUM 8.1*  8.1*  < > 8.1*  8.1*  8.1* 8.2* 8.3*  8.3*  8.3* 8.3*  8.3*  8.3*  --    ALBUMIN 2.4*  2.4*  < > 2.2*  2.2*  2.2* 2.1* 2.2*  2.2*  2.2* 2.4*  2.4*  2.4*  --    PROT 6.0  --  6.0  --   --  6.2  --      141  < > 142  142  142 143 143  143  143 143  143  143  --    K 4.8  4.8  < > 4.7  4.7  4.7 4.3 4.7  4.7  4.7 4.8  4.8  4.8 4.7   CO2 21*  21*  < > 24  24  24 27 25  25  25 24   24  24  --      107  < > 108  108  108 106 107  107  107 108  108  108  --    BUN 32*  32*  < > 10  10  10 13 12  12  12 7  7  7  --    CREATININE 2.1*  2.1*  < > 0.8  0.8  0.8 0.8 0.7  0.7  0.7 0.6  0.6  0.6  --    ALKPHOS 204*  --  209*  --   --  231*  --    ALT 24  --  24  --   --  29  --    AST 32  --  35  --   --  42*  --    BILITOT 1.2*  --  1.1*  --   --  1.2*  --    < > = values in this interval not displayed.   Coagulation:     Recent Labs  Lab 03/23/18  1550  03/26/18  1659 03/26/18 2017 03/27/18  0801   INR 1.4*  < > 1.0 1.0 1.0   APTT 25.5  --   --   --   --    < > = values in this interval not displayed.  LDH:  No results for input(s): LDH in the last 72 hours.  Microbiology:  Microbiology Results (last 7 days)     Procedure Component Value Units Date/Time    Fungus culture [[id number]] Collected:  03/14/18 1137    Order Status:  Completed Specimen:  Bronchial Wash from Amniotic Fluid Updated:  03/28/18 0933     Fungus (Mycology) Culture Culture in progress     Fungus (Mycology) Culture No fungus isolated after 2 weeks    Blood culture [[id number]] Collected:  03/25/18 1554    Order Status:  Completed Specimen:  Blood from Peripheral, Upper Arm, Right Updated:  03/27/18 1812     Blood Culture, Routine No Growth to date     Blood Culture, Routine No Growth to date     Blood Culture, Routine No Growth to date    Blood culture [[id number]] Collected:  03/25/18 1606    Order Status:  Completed Specimen:  Blood from Peripheral, Forearm, Left Updated:  03/27/18 1812     Blood Culture, Routine No Growth to date     Blood Culture, Routine No Growth to date     Blood Culture, Routine No Growth to date    Culture, Respiratory with Gram Stain [[id number]] Collected:  03/25/18 1534    Order Status:  Completed Specimen:  Respiratory from Sputum, Induced Updated:  03/27/18 0739     Respiratory Culture Normal respiratory hank     Gram Stain (Respiratory) <10 epithelial cells per low power  field.     Gram Stain (Respiratory) Rare WBC's     Gram Stain (Respiratory) Rare Gram positive cocci    Blood culture [[id number]] Collected:  03/21/18 0815    Order Status:  Completed Specimen:  Blood from Peripheral, Forearm, Left Updated:  03/26/18 1012     Blood Culture, Routine No growth after 5 days.    Urine culture [[id number]]     Order Status:  Canceled Specimen:  Urine     Blood culture [[id number]] Collected:  03/18/18 0748    Order Status:  Completed Specimen:  Blood from Peripheral, Hand, Right Updated:  03/23/18 1012     Blood Culture, Routine No growth after 5 days.    Blood culture [[id number]] Collected:  03/18/18 0618    Order Status:  Completed Specimen:  Blood from Peripheral, Hand, Left Updated:  03/21/18 1108     Blood Culture, Routine Gram stain aer bottle: Gram positive cocci in clusters resembling Staph      Blood Culture, Routine Results called to and read back by:Omid Cody RN 03/19/2018  10:55     Blood Culture, Routine --     COAGULASE-NEGATIVE STAPHYLOCOCCUS SPECIES  Organism is a probable contaminant            I have reviewed all pertinent labs within the past 24 hours.    Estimated Creatinine Clearance: 146.9 mL/min (based on SCr of 0.6 mg/dL).    Diagnostic Results:  I have reviewed all pertinent imaging results/findings within the past 24 hours.

## 2018-03-28 NOTE — TRANSFER OF CARE
"Anesthesia Transfer of Care Note    Patient: Lv Crocker    Procedure(s) Performed: Procedure(s) (LRB):  TRACHEOSTOMY (N/A)  BRONCHOSCOPY-OPERATIVE,FLEXIBLE (N/A)  LAVAGE-ALVEOLAR (N/A)    Patient location: ICU    Anesthesia Type: general    Transport from OR: Transported from OR on 6-10 L/min O2 by face mask with adequate spontaneous ventilation. Upon arrival to PACU/ICU, patient attached to ventilator and auscultated to confirm bilateral breath sounds and adequate TV. Continuous ECG monitoring in transport. Continuous SpO2 monitoring in transport. Continuos invasive BP monitoring in transport    Post pain: adequate analgesia    Post assessment: no apparent anesthetic complications and tolerated procedure well    Post vital signs: stable    Level of consciousness: sedated and responds to stimulation    Nausea/Vomiting: no nausea/vomiting    Complications: none    Transfer of care protocol was followed      Last vitals:   Visit Vitals  BP (!) 99/57 (BP Location: Right arm, Patient Position: Lying)   Pulse (!) 123   Temp 36.8 °C (98.2 °F) (Axillary)   Resp (!) 0   Ht 5' 7" (1.702 m)   Wt 73.2 kg (161 lb 6 oz)   SpO2 100%   BMI 25.28 kg/m²     "

## 2018-03-28 NOTE — ASSESSMENT & PLAN NOTE
- TTE 3/26/18 showed normal graft function  - Had -ve DSE on 11/30/17  - As he was on Pred 2.5 mg qd at home and he became hypotensive over the weekend (Hydrocortisone was completed 3/23),  resumed at 50 mg IV every 8 hours 3/26/18  - Tacro level 7.8 (goal 6-9). Continue Tacro 1 mg bid  - Cellcept remains on hold

## 2018-03-28 NOTE — ASSESSMENT & PLAN NOTE
- 41 y/o man with a ICM, s/p heart transplant 11/18/14; previously seen by ID on this admission for RSV-A pneumonia/multifocal pneumonia  - ID called back today as patient had fever and was hypotensive overnight requiring vasopressors. Previous infectious work up done during admission reviewed.  - Had bronchoscopy and BAL performed on 3/14/18 with cultures NGTD   - B/C 3/25/18 NGTD  - respiratory culture 3/25/18 NGTD  - CXR 3/26/18: continued right pleural effusion.  Diffuse increase in the pulmonary vascular interstitial and alveolar markings. Focal opacity developing at the left base.  - switched antibiotic therapy to meropenem and linezolid; continue posaconazole for now  - Chest ST with following findings: previously identified areas of consolidation noted in the left lung on prior chest CT on 03/13/2018 appear to have cleared with interval development of multiple new ground-glass opacities in the left upper and lower lobes as well as the right upper lobe, suggestive of edema versus pneumonitis. Large rounded opacity in the right lower lobe with associated air bronchogram that appears to have increased in size since prior examination on 07/19/2016 but appears similar to 03/13/2018 and 02/14/2018. Findings are suggestive of rounded atelectasis in the setting of chronic right-sided pleural effusion  - Improved since last evaluation, off vasopressors scheduled for tracheostomy and PEG today   - As per discussion with team will have bronchoscopy and thoracentesis of right chronic effusion today   - Recommend to continue meropenem and linezolid for 7-10 day course   - Can D/C posaconazole   - will follow new cultures and thoracentesis results     Recommendations   - D/C posaconazole  - continue linezolid and meropenem for 7-10 day course   - will monitor progress of new culture results

## 2018-03-28 NOTE — PROGRESS NOTES
[facility]  Thoracic Surgery  Progress Note    Subjective:     History of Present Illness:  42 year old male with PMH of 42 y.o. male presents with PMH of OHTX in November 2014, Hashimoto's thyroiditis, T1DM, CKD and history of recurrent acsites and pleural effusions now admitted for respiratory failure from severe RSV. He is known to thoracic surgery as he underwent a right VATS drainage of effusion and Pleurx placement on 1/11/18 by Dr. James. Pleurx was removed in clinic on 2/7/18 due to minimal output and potential clogging. In February he was admitted for diarrhea, worsening nonproductive cough and near syncopal episodes. Most recently admitted on 3/13/18 after he was found to be febrile and hypoxic in endoscopy lab.  Intubated on 3/14/18 for hypoxemic and hypercapnic resp failure. Chest CT at that time showed small right pleural effusion and bilateral subsegmental multifocal consolidation with air bronchograms more extensive in left lung. Since then he has had a complicated hospital stay for presumed severe RSV including acute kidney failure on CRRT, intermittent pressor requirement, DIC and several failed SBTs.    Currently on FiO2 of 40% and 5 PEEP. Tachycardic but not on pressors. Has been on Vanc, Zosyn and  Posoconazole . Switched to Linezolid and Meropenum today.   On Tacrolimus and Hydrocortisone for immunosuppression, Cellcept held.        PSH significant for OHTx in 2014 and cholecystectomy. Prior to right pleurx placement, he was getting monthly paracenteses and thoracentesis.     Post-Op Info:  Procedure(s) (LRB):  ESOPHAGOGASTRODUODENOSCOPY (EGD) (N/A)  COLONOSCOPY (N/A)   15 Days Post-Op     Interval History: NAEON. Abdominal US and chest CT show widespread ascites. Remains stable on 40% FiO2 and 5 PEEP.     Medications:  Continuous Infusions:   sodium chloride 0.9% 200 mL/hr at 03/28/18 0300    dexmedetomidine (PRECEDEX) infusion Stopped (03/21/18 0750)    insulin  (HUMAN R) infusion (adults) 1.7 Units/hr (03/28/18 0759)    norepinephrine bitartrate-D5W Stopped (03/26/18 1320)     Scheduled Meds:   chlorhexidine  15 mL Mouth/Throat BID    docusate  100 mg Per NG tube BID    escitalopram oxalate  20 mg Oral Daily    famotidine (PF)  20 mg Intravenous BID    heparin (porcine)  5,000 Units Subcutaneous Q12H    hydrocortisone sodium succinate  50 mg Intravenous Q8H    levothyroxine  125 mcg Per NG tube Before breakfast    linezolid 600mg/300ml  600 mg Intravenous Q12H    meropenem (MERREM) IVPB  2 g Intravenous Q12H    metoclopramide HCl  5 mg Intravenous Q6H    polyethylene glycol  17 g Per NG tube Daily    posaconazole 200 mg/5ml (40 mg/ml)  200 mg Per NG tube TID WM    sodium chloride 0.9%  3 mL Intravenous Q8H    tacrolimus  1 mg Oral BID     PRN Meds:acetaminophen, dextrose 50%, dextrose 50%, dextrose 50%, dextrose 50%, glucagon (human recombinant), glucose, glucose, insulin aspart U-100, k phos di & mono-sod phos mono     Review of patient's allergies indicates:   Allergen Reactions    No known drug allergies      Objective:     Vital Signs (Most Recent):  Temp: 98.2 °F (36.8 °C) (03/27/18 1907)  Pulse: (!) 121 (03/28/18 0719)  Resp: 20 (03/28/18 0600)  BP: 106/63 (03/28/18 0600)  SpO2: 100 % (03/28/18 0719) Vital Signs (24h Range):  Temp:  [97.7 °F (36.5 °C)-98.2 °F (36.8 °C)] 98.2 °F (36.8 °C)  Pulse:  [115-124] 121  Resp:  [17-28] 20  SpO2:  [100 %] 100 %  BP: ()/(54-72) 106/63  Arterial Line BP: ()/(43-74) 117/62     Intake/Output - Last 3 Shifts       03/26 0700 - 03/27 0659 03/27 0700 - 03/28 0659 03/28 0700 - 03/29 0659    I.V. (mL/kg) 3200.3 (42.8) 3183.3 (43.5)     Blood       NG/GT 1500 1230     IV Piggyback 900 700     Total Intake(mL/kg) 5600.3 (75) 5113.3 (69.9)     Other 4501 6247     Stool 1      Total Output 4502 6247      Net +1098.3 -1133.7             Stool Occurrence 1 x            SpO2: 100 %  O2 Device (Oxygen Therapy):  ventilator    Physical Exam   Constitutional: He is intubated.   HENT:   Head: Normocephalic and atraumatic.   Neck: Normal range of motion. No tracheal deviation present. No thyromegaly present.   Cardiovascular: Regular rhythm and intact distal pulses.  Tachycardia present.    No murmur heard.  Pulmonary/Chest: He is intubated. He has decreased breath sounds in the right lower field and the left lower field.   Abdominal: Soft. Bowel sounds are normal. He exhibits no distension. There is no tenderness.   Musculoskeletal: He exhibits no edema.   Lymphadenopathy:     He has no cervical adenopathy.   Neurological: He is alert.       Significant Labs:  ABGs:   Recent Labs  Lab 03/28/18  0404   PH 7.404   PCO2 43.6   PO2 31*   HCO3 27.2   POCSATURATED 60*   BE 2     BMP:   Recent Labs  Lab 03/28/18  0009 03/28/18  0356   GLU  --  118*  118*  118*   NA  --  143  143  143   K  --  4.8  4.8  4.8   CL  --  108  108  108   CO2  --  24  24  24   BUN  --  7  7  7   CREATININE  --  0.6  0.6  0.6   CALCIUM  --  8.3*  8.3*  8.3*   MG 2.0  2.0  --      CBC:   Recent Labs  Lab 03/28/18  0357   WBC 5.05   RBC 2.51*   HGB 7.7*   HCT 24.2*      MCV 96   MCH 30.7   MCHC 31.8*     CMP:   Recent Labs  Lab 03/28/18  0356   *  118*  118*   CALCIUM 8.3*  8.3*  8.3*   ALBUMIN 2.4*  2.4*  2.4*   PROT 6.2     143  143   K 4.8  4.8  4.8   CO2 24  24  24     108  108   BUN 7  7  7   CREATININE 0.6  0.6  0.6   ALKPHOS 231*   ALT 29   AST 42*   BILITOT 1.2*     Coagulation:   Recent Labs  Lab 03/27/18  0801   INR 1.0       Significant Diagnostics:  CT: I have reviewed all pertinent results/findings within the past 24 hours  CXR: I have reviewed all pertinent results/findings within the past 24 hours    VTE Risk Mitigation         Ordered     heparin (porcine) injection 5,000 Units  Every 12 hours     Route:  Subcutaneous        03/23/18 1611        Assessment/Plan:     * Acute  respiratory failure with hypoxia    42 y.o. male presents with PMH of OHTX in November 2014, Hashimoto's thyroiditis, T1DM, CKD and history of recurrent acsites and pleural effusions now admitted for respiratory failure from severe RSV.    - Flexible bronch and trach today. Will hold off on PEG placement.    Appropriate patient education regarding the kamari-operative period as well as intraperative details were discussed. Risks, including but not limited to, bleeding, infection, pain and anesthetic complication were discussed. Patient was given the opportunity to ask questions and to have those questions answered to their satisfaction. Patient verbalized understanding to both procedure and associated risks. Consent was obtained.                  JOSE DANIEL Anderson  Thoracic Surgery  [facility]

## 2018-03-28 NOTE — ASSESSMENT & PLAN NOTE
42 y.o. male presents with PMH of OHTX in November 2014, Hashimoto's thyroiditis, T1DM, CKD and history of recurrent acsites and pleural effusions now admitted for respiratory failure from severe RSV.    - Flexible bronch and trach today. Will hold off on PEG placement.    Appropriate patient education regarding the kamari-operative period as well as intraperative details were discussed. Risks, including but not limited to, bleeding, infection, pain and anesthetic complication were discussed. Patient was given the opportunity to ask questions and to have those questions answered to their satisfaction. Patient verbalized understanding to both procedure and associated risks. Consent was obtained.

## 2018-03-28 NOTE — ASSESSMENT & PLAN NOTE
-S/P Bronch and intubation 3/14. Cultures ngtd. To have repeat bronch and trach placement today  -CT scan 3/27 showed areas of consolidation on left lung have disappeared which goes against an infective process. Large pleural effusion on right with consolidation/air bronchograms - Thoracic Surgery plans to address this effusion during bronch today    -RSV positive. Completed course of Ribavarin/IVIG. Per lung transplant protocol for severe RSV(given myelosuppression).   -Aspergill Ag neg from BAL.   -Urine histo/blasto antigens neg, crypto antigen neg, urine legionella neg, and Quantiferon pending.  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 Continues to fail SBP - plan for trach today as above. Pulmonology managing vent

## 2018-03-28 NOTE — NURSING
Post Heart Transplant Coordinator note    Visited with pt and pt's wife in his CMICU room. Pt getting ready to go to OR for trach. Pt followed commands for me. Pt's wife is tired and worn out. She is afraid to leave his bedside. She stated she may go home Friday for the day and come back either Friday night or Saturday morning. Encouraged her to stay with her 15 yo son for the night and allow someone else stay with pt. She will think about it.

## 2018-03-28 NOTE — ANESTHESIA POSTPROCEDURE EVALUATION
"Anesthesia Post Evaluation    Patient: Lv Crocker    Procedure(s) Performed: Procedure(s) (LRB):  TRACHEOSTOMY (N/A)  BRONCHOSCOPY-OPERATIVE,FLEXIBLE (N/A)  LAVAGE-ALVEOLAR (N/A)    Final Anesthesia Type: general  Patient location during evaluation: ICU  Patient participation: No - Unable to Participate, Intubation  Level of consciousness: responds to stimulation and sedated  Post-procedure vital signs: reviewed and stable  Pain management: adequate  Airway patency: patent  PONV status at discharge: No PONV  Anesthetic complications: no      Cardiovascular status: hemodynamically stable  Respiratory status: ventilator (trach)  Follow-up not needed.        Visit Vitals  BP (!) 99/57 (BP Location: Right arm, Patient Position: Lying)   Pulse (!) 123   Temp 36.8 °C (98.2 °F) (Axillary)   Resp (!) 0   Ht 5' 7" (1.702 m)   Wt 73.2 kg (161 lb 6 oz)   SpO2 100%   BMI 25.28 kg/m²       Pain/Jose Score: Pain Assessment Performed: Yes (3/28/2018 11:00 AM)  Presence of Pain: non-verbal indicators absent (3/28/2018 11:00 AM)      "

## 2018-03-28 NOTE — SUBJECTIVE & OBJECTIVE
"Interval HPI:   Overnight events: Now NPO for procedure. Switched to intensive insulin drip with q1.  Hypoglycemia and intervention: No  Fever: No  TPN and/or TF: No  If yes, type of TF/TPN and rate: TF held for procedure.    /63 (BP Location: Right arm, Patient Position: Lying)   Pulse (!) 120   Temp 98.6 °F (37 °C) (Axillary)   Resp (!) 23   Ht 5' 7" (1.702 m)   Wt 73.2 kg (161 lb 6 oz)   SpO2 99%   BMI 25.28 kg/m²     Labs Reviewed and Include      Recent Labs  Lab 03/28/18  0356 03/28/18  0813   *  118*  118*  --    CALCIUM 8.3*  8.3*  8.3*  --    ALBUMIN 2.4*  2.4*  2.4*  --    PROT 6.2  --      143  143  --    K 4.8  4.8  4.8 4.7   CO2 24  24  24  --      108  108  --    BUN 7  7  7  --    CREATININE 0.6  0.6  0.6  --    ALKPHOS 231*  --    ALT 29  --    AST 42*  --    BILITOT 1.2*  --      Lab Results   Component Value Date    WBC 4.02 03/28/2018    HGB 6.7 (L) 03/28/2018    HCT 21.6 (L) 03/28/2018    MCV 96 03/28/2018     03/28/2018     No results for input(s): TSH, FREET4 in the last 168 hours.  Lab Results   Component Value Date    HGBA1C 6.9 (H) 02/11/2018       Nutritional status:   Body mass index is 25.28 kg/m².  Lab Results   Component Value Date    ALBUMIN 2.4 (L) 03/28/2018    ALBUMIN 2.4 (L) 03/28/2018    ALBUMIN 2.4 (L) 03/28/2018     Lab Results   Component Value Date    PREALBUMIN 5 (L) 03/15/2018    PREALBUMIN 18 (L) 03/24/2015    PREALBUMIN 19 (L) 12/17/2014       Estimated Creatinine Clearance: 146.9 mL/min (based on SCr of 0.6 mg/dL).    Accu-Checks  Recent Labs      03/28/18   0009  03/28/18   0122  03/28/18   0210  03/28/18   0402  03/28/18   0516  03/28/18   0619  03/28/18   0757  03/28/18   0911  03/28/18   1015  03/28/18   1057   POCTGLUCOSE  118*  114*  122*  129*  121*  247*  256*  212*  242*  257*       Current Medications and/or Treatments Impacting Glycemic Control  Immunotherapy:  Immunosuppressants         Stop Route " Frequency     tacrolimus capsule 1 mg      -- Oral 2 times daily        Steroids:   Hormones     Start     Stop Route Frequency Ordered    03/26/18 0915  hydrocortisone sodium succinate injection 50 mg      -- IV Every 8 hours 03/26/18 0909        Pressors:    Autonomic Drugs     Start     Stop Route Frequency Ordered    03/25/18 1430  norepinephrine 32 mg in sodium chloride 0.9% 250 mL infusion     Question Answer Comment   Titrate by: (in mcg/kg/min) 0.01    Titrate interval: (in minutes) 10    Titrate to maintain: (MAP or SBP) MAP    Greater than: (in mmHg) 60    Maximum dose: (in mcg/kg/min) 0.05        -- IV Continuous 03/25/18 1419        Hyperglycemia/Diabetes Medications: Antihyperglycemics     Start     Stop Route Frequency Ordered    03/28/18 0245  insulin regular (Humulin R) 100 Units in sodium chloride 0.9% 100 mL infusion     Question:  Insulin Rate Adjustment (DO NOT MODIFY ANSWER)  Answer:  [email or web address]    -- IV Continuous 03/28/18 0144    03/27/18 1000  insulin aspart U-100 pen 0-5 Units      -- SubQ As needed (PRN) 03/27/18 0900

## 2018-03-28 NOTE — PROGRESS NOTES
Wound care consulted for left upper lip from ETT.  The right upper lip near corner has a scabbed over tear/mucosal membrane .  The ETT tube is located at the left corner of the mouth.  The right thumb and right toes ( 1,2,3) are discolored which started after the patient was placed on vasopressors according to wife.  The fingers and toes cristine except for the right 3rd toe tip- black, hardened, no erythema, no drainage, no blistering noted.  Skin breakdown preventions in progress. Patient is scheduled for a trach placement and PEG today.   Plan of care discussed with patient/family who verbalized understanding.  Recommendations:  Monitor extremities for discolored areas  Sween 24 lotion to the lips to moisten-     03/28/18 1000       Wound 03/28/18 1030 Other Toe, third   Date First Assessed/Time First Assessed: 03/28/18 1030   Pre-existing: No  Wound Type: (c) Other  Side: Right  Location: Toe, third   Wound Image    Wound WDL ex   Dressing Appearance Open to air;No dressing   Drainage Amount None   Appearance Black;Dry   Black (%), Wound Tissue Color 100 %  (tip of toe)   Periwound Area Intact;Dry;Ecchymotic  (blanches)   Wound Length (cm) 0.7   Wound Width (cm) 0.7       Wound 03/28/18 1030 Other Finger, first   Date First Assessed/Time First Assessed: 03/28/18 1030   Pre-existing: No  Wound Type: (c) Other  Side: Right  Location: Finger, first   Wound Image    Wound WDL ex   Dressing Appearance Open to air;No dressing   Drainage Amount None   Appearance Ecchymotic   Tissue loss description Not applicable   Periwound Area Ecchymotic       Wound 03/28/18 1030 Skin tear upper Lip   Date First Assessed/Time First Assessed: 03/28/18 1030   Pre-existing: No  Wound Type: Skin tear  Side: Right  Orientation: upper  Location: Lip   Wound Image    Wound WDL ex   Dressing Appearance Open to air;No dressing   Drainage Amount None   Appearance Closed/resurfaced   Tissue loss description Not applicable   Periwound Area  Intact;Dry   Wound Length (cm) 0.6   Wound Width (cm) 0.2   Care Applied:;Moisturizing agent   Skin Interventions   Device Skin Pressure Protection positioning supports utilized;pressure points protected   Pressure Reduction Devices Foam padding utilized;Heel offloading device utilized;Pressure-redistributing mattress utilized   Pressure Reduction Techniques weight shift assistance provided;heels elevated off bed;positioned off wounds;pressure points protected

## 2018-03-28 NOTE — PLAN OF CARE
Problem: Patient Care Overview  Goal: Plan of Care Review  Outcome: Ongoing (interventions implemented as appropriate)  No acute events throughout shift. Pt responding to simple commands and questions. See flowsheet for vitals and assessment. Insulin gtt continued, tube feeds stopped at midnight. Insulin dose halved per endocrine. Blood sugars checked every hour. CRRT ran for 7 hrs overnight. CVP 6-7. Plan for trach and peg placement this AM. POC reviewed with patient and spouse. Will continue to monitor.

## 2018-03-28 NOTE — ASSESSMENT & PLAN NOTE
- Afebilre past 24 hours. Blood and urine cultures 3/25/18 with NGTD, sputum culture -ve  - CT scan 3/27 showed areas of consolidation on left lung have disappeared which goes against an infective process. Large pleural effusion on right with consolidation/air bronchograms - Thoracic Surgery plans to address this effusion during bronch today  - Continue Posaconazole, Meropenem and Zyvox per ID rec

## 2018-03-28 NOTE — PROGRESS NOTES
[facility]  Heart Transplant  Progress Note    Patient Name: Lv Crocker  MRN: [id number]  Admission Date: 3/11/2018  Hospital Length of Stay: 16 days  Attending Physician: Jose A Lloyd MD  Primary Care Provider: Philippe Mohr MD  Principal Problem:Acute respiratory failure with hypoxia    Subjective:     Interval History: Able to wiggle fingers to command today. Tolerating  without hypotension    Continuous Infusions:   sodium chloride 0.9%      dexmedetomidine (PRECEDEX) infusion Stopped (03/21/18 0750)    insulin (HUMAN R) infusion (adults) 1.5 Units/hr (03/28/18 1017)    norepinephrine bitartrate-D5W Stopped (03/26/18 1320)     Scheduled Meds:   chlorhexidine  15 mL Mouth/Throat BID    docusate  100 mg Per NG tube BID    escitalopram oxalate  20 mg Oral Daily    famotidine (PF)  20 mg Intravenous BID    heparin (porcine)  5,000 Units Subcutaneous Q12H    hydrocortisone sodium succinate  50 mg Intravenous Q8H    levothyroxine  125 mcg Per NG tube Before breakfast    linezolid 600mg/300ml  600 mg Intravenous Q12H    meropenem (MERREM) IVPB  2 g Intravenous Q12H    metoclopramide HCl  5 mg Intravenous Q6H    polyethylene glycol  17 g Per NG tube Daily    posaconazole 200 mg/5ml (40 mg/ml)  200 mg Per NG tube TID WM    sodium chloride 0.9%  3 mL Intravenous Q8H    tacrolimus  1 mg Oral BID     PRN Meds:acetaminophen, dextrose 50%, dextrose 50%, dextrose 50%, dextrose 50%, glucagon (human recombinant), glucose, glucose, insulin aspart U-100, k phos di & mono-sod phos mono    Review of patient's allergies indicates:   Allergen Reactions    No known drug allergies      Objective:     Vital Signs (Most Recent):  Temp: 98.2 °F (36.8 °C) (03/28/18 0800)  Pulse: (!) 122 (03/28/18 0927)  Resp: (!) 21 (03/28/18 0815)  BP: 112/74 (03/28/18 0800)  SpO2: 97 % (03/28/18 0927) Vital Signs (24h Range):  Temp:  [97.7 °F (36.5 °C)-98.2 °F (36.8 °C)] 98.2 °F (36.8 °C)  Pulse:   [115-124] 122  Resp:  [17-28] 21  SpO2:  [97 %-100 %] 97 %  BP: ()/(54-74) 112/74  Arterial Line BP: ()/(43-74) 130/70     Patient Vitals for the past 72 hrs (Last 3 readings):   Weight   03/28/18 0400 73.2 kg (161 lb 6 oz)   03/27/18 1400 74.7 kg (164 lb 10.9 oz)   03/27/18 0609 74.7 kg (164 lb 10.9 oz)     Body mass index is 25.28 kg/m².      Intake/Output Summary (Last 24 hours) at 03/28/18 1021  Last data filed at 03/28/18 0800   Gross per 24 hour   Intake          3975.67 ml   Output             5009 ml   Net         -1033.33 ml       Hemodynamic Parameters:       Telemetry: ST    Physical Exam   Constitutional: He appears well-developed and well-nourished.   Appears ill   HENT:   Head: Normocephalic and atraumatic.   Eyes: Conjunctivae are normal. Pupils are equal, round, and reactive to light.   Neck: No thyromegaly present.   RIJ trialysis   Cardiovascular: Regular rhythm.    Tachycardic   Pulmonary/Chest:   Intubated and ventilated  Breath sounds are slightly decreased right base. Coarse bilaterally   Abdominal: Soft. Bowel sounds are normal.   Musculoskeletal:   Trace - 1+ gen edema   Neurological:   Intubated. Arouses to voice and nods head appropriately   Skin: Skin is warm and dry. Capillary refill takes less than 2 seconds. There is pallor.       Significant Labs:  CBC:    Recent Labs  Lab 03/27/18  2018 03/28/18  0357 03/28/18  0813   WBC 3.56* 5.05 4.02   RBC 2.30* 2.51* 2.24*   HGB 6.7* 7.7* 6.7*   HCT 22.1* 24.2* 21.6*    204 183   MCV 96 96 96   MCH 29.1 30.7 29.9   MCHC 30.3* 31.8* 31.0*     BNP:  No results for input(s): BNP in the last 168 hours.    Invalid input(s): BNPTRIAGELBLO  CMP:    Recent Labs  Lab 03/26/18  0404  03/27/18  0311 03/27/18  1502 03/27/18  2157 03/28/18  0356 03/28/18  0813   *  215*  < > 171*  171*  171* 229* 130*  130*  130* 118*  118*  118*  --    CALCIUM 8.1*  8.1*  < > 8.1*  8.1*  8.1* 8.2* 8.3*  8.3*  8.3* 8.3*  8.3*  8.3*  --     ALBUMIN 2.4*  2.4*  < > 2.2*  2.2*  2.2* 2.1* 2.2*  2.2*  2.2* 2.4*  2.4*  2.4*  --    PROT 6.0  --  6.0  --   --  6.2  --      141  < > 142  142  142 143 143  143  143 143  143  143  --    K 4.8  4.8  < > 4.7  4.7  4.7 4.3 4.7  4.7  4.7 4.8  4.8  4.8 4.7   CO2 21*  21*  < > 24  24  24 27 25  25  25 24  24  24  --      107  < > 108  108  108 106 107  107  107 108  108  108  --    BUN 32*  32*  < > 10  10  10 13 12  12  12 7  7  7  --    CREATININE 2.1*  2.1*  < > 0.8  0.8  0.8 0.8 0.7  0.7  0.7 0.6  0.6  0.6  --    ALKPHOS 204*  --  209*  --   --  231*  --    ALT 24  --  24  --   --  29  --    AST 32  --  35  --   --  42*  --    BILITOT 1.2*  --  1.1*  --   --  1.2*  --    < > = values in this interval not displayed.   Coagulation:     Recent Labs  Lab 03/23/18  1550  03/26/18  1659 03/26/18 2017 03/27/18  0801   INR 1.4*  < > 1.0 1.0 1.0   APTT 25.5  --   --   --   --    < > = values in this interval not displayed.  LDH:  No results for input(s): LDH in the last 72 hours.  Microbiology:  Microbiology Results (last 7 days)     Procedure Component Value Units Date/Time    Fungus culture [[id number]] Collected:  03/14/18 1137    Order Status:  Completed Specimen:  Bronchial Wash from Amniotic Fluid Updated:  03/28/18 0933     Fungus (Mycology) Culture Culture in progress     Fungus (Mycology) Culture No fungus isolated after 2 weeks    Blood culture [[id number]] Collected:  03/25/18 1554    Order Status:  Completed Specimen:  Blood from Peripheral, Upper Arm, Right Updated:  03/27/18 1812     Blood Culture, Routine No Growth to date     Blood Culture, Routine No Growth to date     Blood Culture, Routine No Growth to date    Blood culture [[id number]] Collected:  03/25/18 1606    Order Status:  Completed Specimen:  Blood from Peripheral, Forearm, Left Updated:  03/27/18 1812     Blood Culture, Routine No Growth to date     Blood Culture, Routine No Growth to  date     Blood Culture, Routine No Growth to date    Culture, Respiratory with Gram Stain [[id number]] Collected:  03/25/18 1534    Order Status:  Completed Specimen:  Respiratory from Sputum, Induced Updated:  03/27/18 0739     Respiratory Culture Normal respiratory hank     Gram Stain (Respiratory) <10 epithelial cells per low power field.     Gram Stain (Respiratory) Rare WBC's     Gram Stain (Respiratory) Rare Gram positive cocci    Blood culture [[id number]] Collected:  03/21/18 0815    Order Status:  Completed Specimen:  Blood from Peripheral, Forearm, Left Updated:  03/26/18 1012     Blood Culture, Routine No growth after 5 days.    Urine culture [[id number]]     Order Status:  Canceled Specimen:  Urine     Blood culture [[id number]] Collected:  03/18/18 0748    Order Status:  Completed Specimen:  Blood from Peripheral, Hand, Right Updated:  03/23/18 1012     Blood Culture, Routine No growth after 5 days.    Blood culture [[id number]] Collected:  03/18/18 0618    Order Status:  Completed Specimen:  Blood from Peripheral, Hand, Left Updated:  03/21/18 1108     Blood Culture, Routine Gram stain aer bottle: Gram positive cocci in clusters resembling Staph      Blood Culture, Routine Results called to and read back by:Omid Cody RN 03/19/2018  10:55     Blood Culture, Routine --     COAGULASE-NEGATIVE STAPHYLOCOCCUS SPECIES  Organism is a probable contaminant            I have reviewed all pertinent labs within the past 24 hours.    Estimated Creatinine Clearance: 146.9 mL/min (based on SCr of 0.6 mg/dL).    Diagnostic Results:  I have reviewed all pertinent imaging results/findings within the past 24 hours.    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 Most recently has had ongoing issues with his lungs, had gotten 2  thoracenteses, after which he underwent VATS 01/19/18 followed by pleurex catheter placement and effusion drainage 01/11/18, subsequently had it removed 02/07/18 after drainage had decreased despite multiple attempts to drain it. Has been having significant coughing fits that result in him being near-syncopal at times, although difficult to say if he has passed out or not- patient most recently was admitted to the hospital for increased AGUILAR, coughing and [place] [facility] [place]   [place] was started on antibiotics for suspected bacterial infection, with some diarrhea, bronchitis, and possible pneumonia. Ct chest showed some pleural fluid collection and after talking with Dr. James, we arranged for him to receive a diagnostic tap with IR, from which the fluid collection demonstrated no growth or significant findings at all really. Cell counts do not appear to have been sent but will recheck. Patient states since his hospital stay, yesterday had a significant coughing fit again, after which he nearly passed out, is being seen in clinic today for this. Denies any cardiopulmonary complaints, no leg swelling, has some abdominal swelling, which is moderate for him. Denies significant shortness of breath with mild exertion, had 6MWT yesterday to see if he qualified for home O2, and his O2 sats actually improved after recovery from where he started initially. He and his wife admit he is in bed most days, not very active.     Mr. Crocker presented to the ED 3/11/18 with approximately 3 weeks of worsening diarrhea and 2-3 days of sinus pressure, drainage, and worsened cough.  He notes that he had a coughing fit last night and passed out, coughed throughout most of the night.  This also happened on 2/25/18.  He last saw Dr Ferreira in clinic on 2/20/18 where he was taken off myfortic and switched to cellcept (he hadn't been on cellcept because of leukopenia when they tried post-transplant).  Though his diarrhea  had improved after his last discharge, he states it has increased now to 15x/day, clear/yellow/green, no fevers, no exacerbating foods per say.  He was taken back off the cellcept and placed back on myfortic this past week and has not noticed immediate change in diarrhea. He also reports his baseline coughing fits havent improved and are minimally responsive to tessalon pearls.  Came on last night, he lost consciousness during a fit once which is relatively normal for him, and had two more during HPI.  He notes no sick contacts but does state he's had some URI symptoms as above.  His baseline vitals are usually -120 and BP 90s/60s-110s/70s.  He reports a history of intermittent diarrhea - did have Cdiff many years ago but this smells different.  No abdominal pain, no nausea, no vomiting.  No blood in diarrhea.  Appears last c-scope in 2015 with no evidence of infection or inflammatory processes.  He does report IBS which is different that this.       * Acute respiratory failure with hypoxia    -S/P Bronch and intubation 3/14. Cultures ngtd. To have repeat bronch and trach placement today  -CT scan 3/27 showed areas of consolidation on left lung have disappeared which goes against an infective process. Large pleural effusion on right with consolidation/air bronchograms - Thoracic Surgery plans to address this effusion during bronch today    -RSV positive. Completed course of Ribavarin/IVIG. Per lung transplant protocol for severe RSV(given myelosuppression).   -Aspergill Ag neg from BAL.   -Urine histo/blasto antigens neg, crypto antigen neg, urine legionella neg, and Quantiferon pending.  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 Continues to fail SBP - plan for trach today as above. Pulmonology managing vent        Fever    - Afebilre past 24 hours. Blood and urine cultures 3/25/18 with NGTD, sputum culture -ve  - CT scan 3/27 showed areas of consolidation on left lung have disappeared which goes against an infective process. Large pleural effusion on right with consolidation/air bronchograms - Thoracic Surgery plans to address this effusion during bronch today  - Continue Posaconazole, Meropenem and Zyvox per ID rec        Hypotension    - Require resumation of Levo over weekend 2/2 hypotension (2/2 sepsis versus stopping steroids?), but is now off        Restrictive cardiomyopathy    -S/P thoracentesis X 2, followed by VATS/pleurex cath placement (January 2018) with removal of pleurex (February 2018) and 3rd thoracentesis 2/14/18 with no significant findings on fluid removal. Moderate right pleural effusion stable by CXR 3/11/18 and chest CT 3/13/18. CT 3/27/18 as above  -Last paracentesis was in January. Abd US 3/12/18 confirmed ongoing ascites (not tense at all). Abdominal US 3/26 showed small patchy areas of ascites, so Thoracic Surgery is not going to place PEG. Likely have to consult IR for feeding tube placement           Heart transplanted    - TTE 3/26/18 showed normal graft function  - Had -ve DSE on 11/30/17  - As he was on Pred 2.5 mg qd at home and he became hypotensive over the weekend (Hydrocortisone was completed 3/23),  resumed at 50 mg IV every 8 hours 3/26/18  - Tacro level 7.8 (goal 6-9). Continue Tacro 1 mg bid  - Cellcept remains on hold        Acute renal failure superimposed on stage 4 chronic kidney disease    - Appreciate Neph Cx.   - Tolerating UF of 450 without hypotension. CVP 6-8  - Anuric        Ileus    -Having bowel  movements  -No residuals now. Continue tube feeds with goal 50 cc/hr  -Unable to place PEG 2/2 ascites as above. Will likely have to consult IR for feeding tube placement          Type 1 diabetes mellitus with stage 3 chronic kidney disease    - Appreciate Endocrine's recs        Hypothyroidism due to Hashimoto's thyroiditis    - TSH low at 0.101 with normal FT4. Appreciate Endocrine's recs        DIC (disseminated intravascular coagulation)    - resolved at the moment  - secondary to sepsis on admission  - DIC labs including FSP, platelets negative for ongoing status  - cryoglobulin and FFP to be given if needed to keep fibrinogen above 100 and INR below 1.8  - plat[place] level at 145 today, INR 1.0  - Continue to check fibrinogen, INR and Plts level q 12 hours.            Pancytopenia    - Appreciate Hem Cx.   -The patient developed thrombocytopenia 5 days after receiving enoxaparin and has a 4T score of 5 indicating a ~ 14% chance of HIT.  The patients HIT panel was positive with OD of 0.431 indicating a 1-5% chance of having HIT. HILDA -ve   -The patients thrombocytopenia could be drug induced as well.   -The patient had a low fibrinogen, elevated INR, shistocytes on peripheral smear and elevated LDH which all point towards DIC. The haptoglobin is elevated which goes against hemolytic anemia,; however, haptoglobin is also an acute phase reactant.  As DIC is the higher on the differential would treat as such by treating underlying cause which is likely sepsis.       -Fibrinogen, INR and plt count every 12 hours.  -Give cryoprecipitate to get fibrinogen over 100.  Would give one unit at a time.  -Give FFP to get the INR below 1.8.  Would give at least two units at a time.  May consider giving the patient Vitamin K given recent use of coumadin.          Type 1 diabetes mellitus with hyperglycemia    - on insulin gtt        Anemia    - Hgb today is stable at 7.7 - continue to monitor        Debility    - PT/[place] [id number]  Heart Transplant  [facility]

## 2018-03-28 NOTE — ASSESSMENT & PLAN NOTE
-Having bowel movements  -No residuals now. Continue tube feeds with goal 50 cc/hr  -Unable to place PEG 2/2 ascites as above. Will likely have to consult IR for feeding tube placement

## 2018-03-28 NOTE — ASSESSMENT & PLAN NOTE
BG goal 140-180  Lab Results   Component Value Date    HGBA1C 6.9 (H) 02/11/2018     insulin gtt cannot be discontinued as he is a type 1 diabetic  Recheck c-peptide low,anti-insulin Ab pending    Discharge Recommendations:  KHADIJAH.

## 2018-03-28 NOTE — PT/OT/SLP PROGRESS
Occupational Therapy   Treatment    Name: Lv Crocker  MRN: [id number]  Admitting Diagnosis:  Acute respiratory failure with hypoxia  15 Days Post-Op    Recommendations:     Discharge Recommendations:  TBD      Subjective     Communicated with: nsg prior to session.  Pain/Comfort:  · Pain Rating 1: 0/10    Patients cultural, spiritual, Anabaptism conflicts given the current situation: none reported     Objective:     Patient found supine in bed with vent via trach. Nsg reports possible trach and PEG placement this date.   RT and RN present during session with OT/Pt :      General Precautions: Standard, fall, respiratory   Orthopedic Precautions:N/A     Occupational Performance:    Bed Mobility:    · Patient completed Supine to Sit with total assistance and 2 persons  · Patient completed Sit to Supine with total assistance and 2 persons       Activities of Daily Living:  · TOTAL A for all ADL's     Patient left supine with all lines intact, call button in reach and PT and fly present    [facility] 6 Click:  [facility] Total Score: 6    Treatment & Education:  Pt tolerated sitting EOB x 8 min with zero sitting balance. Pt able to hold head near neutral position, but required full assist to bring head to full upright position. Pt awake with eyes open throughout session. Pt follows one step simple commands. Pt visually tracks.  OT provided B UE AA/PROM exs. OT provided education to spouse re: technique to complete PROM supine 2-3 x daily and spouse verb understanding.   Education:    Assessment:     Lv Crocker is a 42 y.o. male with a medical diagnosis of Acute respiratory failure with hypoxia.    Performance deficits affecting function are weakness, impaired functional mobilty, impaired balance, gait instability, impaired self care skills, impaired endurance, decreased coordination, decreased upper extremity function, decreased lower extremity function.  Pt tolerated session fairly well with VSS throughout  session. Pt cooperative. Decreased endurance noted with continued significant weakness throughout body.     Rehab Prognosis:  Good ; patient would benefit from acute skilled OT services to address these deficits and reach maximum level of function.       Plan:     Patient to be seen 5 x/week to address the above listed problems via self-care/home management, therapeutic activities, therapeutic exercises  · Plan of Care Expires: 04/23/18  · Plan of Care Reviewed with: patient, spouse    This Plan of care has been discussed with the patient who was involved in its development and understands and is in agreement with the identified goals and treatment plan    GOALS:    Occupational Therapy Goals        Problem: Occupational Therapy Goal    Goal Priority Disciplines Outcome Interventions   Occupational Therapy Goal     OT, PT/OT Ongoing (interventions implemented as appropriate)    Description:  Goals to be met by: 4/7/2018    Pt will follow 75% of motor commands with <2 verbal cues for repetition of task to maximize engagement in grooming task.  Pt will complete feeding with mod A.   Pt will complete supine with HOB slightly elevated to seated at EOB with mod A in prep for seated grooming task.  Pt will engage in BUE exercises in orders to increase strength to 3+/5 in BUE's to increase engagement in seated grooming task  Pt will sit at EOB for approx 10 minutes with mod A and unilateral UE support to complete grooming task  Pt will complete sit>stand from EOB with bed in lowest position with mod A [place] [facility] [place]                    Time Tracking:     OT Date of Treatment: 03/28/18  OT Start Time: 1245  OT Stop Time: 1325  OT Total Time (min): 40 min    Billable Minutes:Therapeutic Activity 15  Therapeutic Exercise 15    AMADOU Villanueva  3/28/2018

## 2018-03-28 NOTE — SUBJECTIVE & OBJECTIVE
Interval History: Patient scheduled for tracheostomy and PEG placement today, condition unchanged     Review of Systems   Unable to perform ROS: Intubated     Objective:     Vital Signs (Most Recent):  Temp: 98.6 °F (37 °C) (03/28/18 1100)  Pulse: (!) 120 (03/28/18 1115)  Resp: (!) 23 (03/28/18 1100)  BP: 110/63 (03/28/18 1000)  SpO2: 99 % (03/28/18 1115) Vital Signs (24h Range):  Temp:  [98.1 °F (36.7 °C)-98.6 °F (37 °C)] 98.6 °F (37 °C)  Pulse:  [115-124] 120  Resp:  [5-28] 23  SpO2:  [97 %-100 %] 99 %  BP: ()/(54-74) 110/63  Arterial Line BP: ()/(43-74) 123/66     Weight: 73.2 kg (161 lb 6 oz)  Body mass index is 25.28 kg/m².    Estimated Creatinine Clearance: 146.9 mL/min (based on SCr of 0.6 mg/dL).    Physical Exam   Constitutional: He is sedated and intubated.   Eyes: No scleral icterus.   Cardiovascular: Normal rate and regular rhythm.    Pulmonary/Chest: He is intubated. He has no wheezes. He has no rales.   Abdominal: Soft. He exhibits no distension and no mass. There is no rebound.   Musculoskeletal: He exhibits no edema.   Lymphadenopathy:     He has no cervical adenopathy.   Neurological: He is alert.   Skin: No rash noted. No erythema.       Significant Labs:   Blood Culture:   Recent Labs  Lab 03/18/18  0618 03/18/18  0748 03/21/18  0815 03/25/18  1554 03/25/18  1606   LABBLOO Gram stain aer bottle: Gram positive cocci in clusters resembling Staph   Results called to and read back by:Omid Cody RN 03/19/2018  10:55  COAGULASE-NEGATIVE STAPHYLOCOCCUS SPECIESOrganism is a probable contaminant No growth after 5 days. No growth after 5 days. No Growth to date  No Growth to date  No Growth to date No Growth to date  No Growth to date  No Growth to date     CBC:   Recent Labs  Lab 03/27/18 2018 03/28/18  0357 03/28/18  0813   WBC 3.56* 5.05 4.02   HGB 6.7* 7.7* 6.7*   HCT 22.1* 24.2* 21.6*    204 183     CMP:   Recent Labs  Lab 03/27/18  0311 03/27/18  1502 03/27/18  2153  03/28/18  0356 03/28/18  0813     142  142 143 143  143  143 143  143  143  --    K 4.7  4.7  4.7 4.3 4.7  4.7  4.7 4.8  4.8  4.8 4.7     108  108 106 107  107  107 108  108  108  --    CO2 24  24  24 27 25  25  25 24  24  24  --    *  171*  171* 229* 130*  130*  130* 118*  118*  118*  --    BUN 10  10  10 13 12  12  12 7  7  7  --    CREATININE 0.8  0.8  0.8 0.8 0.7  0.7  0.7 0.6  0.6  0.6  --    CALCIUM 8.1*  8.1*  8.1* 8.2* 8.3*  8.3*  8.3* 8.3*  8.3*  8.3*  --    PROT 6.0  --   --  6.2  --    ALBUMIN 2.2*  2.2*  2.2* 2.1* 2.2*  2.2*  2.2* 2.4*  2.4*  2.4*  --    BILITOT 1.1*  --   --  1.2*  --    ALKPHOS 209*  --   --  231*  --    AST 35  --   --  42*  --    ALT 24  --   --  29  --    ANIONGAP 10  10  10 10 11  11  11 11  11  11  --    EGFRNONAA >60.0  >60.0  >60.0 >60.0 >60.0  >60.0  >60.0 >60.0  >60.0  >60.0  --      Microbiology Results (last 7 days)     Procedure Component Value Units Date/Time    Fungus culture [[id number]] Collected:  03/14/18 1137    Order Status:  Completed Specimen:  Bronchial Wash from Amniotic Fluid Updated:  03/28/18 0933     Fungus (Mycology) Culture Culture in progress     Fungus (Mycology) Culture No fungus isolated after 2 weeks    Blood culture [[id number]] Collected:  03/25/18 1554    Order Status:  Completed Specimen:  Blood from Peripheral, Upper Arm, Right Updated:  03/27/18 1812     Blood Culture, Routine No Growth to date     Blood Culture, Routine No Growth to date     Blood Culture, Routine No Growth to date    Blood culture [[id number]] Collected:  03/25/18 1606    Order Status:  Completed Specimen:  Blood from Peripheral, Forearm, Left Updated:  03/27/18 1812     Blood Culture, Routine No Growth to date     Blood Culture, Routine No Growth to date     Blood Culture, Routine No Growth to date    Culture, Respiratory with Gram Stain [[id number]] Collected:  03/25/18 1534    Order Status:   Completed Specimen:  Respiratory from Sputum, Induced Updated:  03/27/18 0739     Respiratory Culture Normal respiratory hank     Gram Stain (Respiratory) <10 epithelial cells per low power field.     Gram Stain (Respiratory) Rare WBC's     Gram Stain (Respiratory) Rare Gram positive cocci    Blood culture [[id number]] Collected:  03/21/18 0815    Order Status:  Completed Specimen:  Blood from Peripheral, Forearm, Left Updated:  03/26/18 1012     Blood Culture, Routine No growth after 5 days.    Urine culture [[id number]]     Order Status:  Canceled Specimen:  Urine     Blood culture [[id number]] Collected:  03/18/18 0748    Order Status:  Completed Specimen:  Blood from Peripheral, Hand, Right Updated:  03/23/18 1012     Blood Culture, Routine No growth after 5 days.          Significant Imaging: I have reviewed all pertinent imaging results/findings within the past 24 hours.

## 2018-03-28 NOTE — PROGRESS NOTES
[facility]  Heart Transplant  Progress Note    Patient Name: Lv Crocker  MRN: [id number]  Admission Date: 3/11/2018  Hospital Length of Stay: 16 days  Attending Physician: Jose A Lloyd MD  Primary Care Provider: Philippe Mohr MD  Principal Problem:Acute respiratory failure with hypoxia    Subjective:     Interval History: Able to wiggle fingers to command today. Tolerating  without hypotension    Continuous Infusions:   sodium chloride 0.9%      dexmedetomidine (PRECEDEX) infusion Stopped (03/21/18 0750)    insulin (HUMAN R) infusion (adults) 1.5 Units/hr (03/28/18 1017)    norepinephrine bitartrate-D5W Stopped (03/26/18 1320)     Scheduled Meds:   chlorhexidine  15 mL Mouth/Throat BID    docusate  100 mg Per NG tube BID    escitalopram oxalate  20 mg Oral Daily    famotidine (PF)  20 mg Intravenous BID    heparin (porcine)  5,000 Units Subcutaneous Q12H    hydrocortisone sodium succinate  50 mg Intravenous Q8H    levothyroxine  125 mcg Per NG tube Before breakfast    linezolid 600mg/300ml  600 mg Intravenous Q12H    meropenem (MERREM) IVPB  2 g Intravenous Q12H    metoclopramide HCl  5 mg Intravenous Q6H    polyethylene glycol  17 g Per NG tube Daily    posaconazole 200 mg/5ml (40 mg/ml)  200 mg Per NG tube TID WM    sodium chloride 0.9%  3 mL Intravenous Q8H    tacrolimus  1 mg Oral BID     PRN Meds:acetaminophen, dextrose 50%, dextrose 50%, dextrose 50%, dextrose 50%, glucagon (human recombinant), glucose, glucose, insulin aspart U-100, k phos di & mono-sod phos mono    Review of patient's allergies indicates:   Allergen Reactions    No known drug allergies      Objective:     Vital Signs (Most Recent):  Temp: 98.2 °F (36.8 °C) (03/28/18 0800)  Pulse: (!) 122 (03/28/18 0927)  Resp: (!) 21 (03/28/18 0815)  BP: 112/74 (03/28/18 0800)  SpO2: 97 % (03/28/18 0927) Vital Signs (24h Range):  Temp:  [97.7 °F (36.5 °C)-98.2 °F (36.8 °C)] 98.2 °F (36.8 °C)  Pulse:   [115-124] 122  Resp:  [17-28] 21  SpO2:  [97 %-100 %] 97 %  BP: ()/(54-74) 112/74  Arterial Line BP: ()/(43-74) 130/70     Patient Vitals for the past 72 hrs (Last 3 readings):   Weight   03/28/18 0400 73.2 kg (161 lb 6 oz)   03/27/18 1400 74.7 kg (164 lb 10.9 oz)   03/27/18 0609 74.7 kg (164 lb 10.9 oz)     Body mass index is 25.28 kg/m².      Intake/Output Summary (Last 24 hours) at 03/28/18 1021  Last data filed at 03/28/18 0800   Gross per 24 hour   Intake          3975.67 ml   Output             5009 ml   Net         -1033.33 ml       Hemodynamic Parameters:       Telemetry: ST    Physical Exam   Constitutional: He appears well-developed and well-nourished.   Appears ill   HENT:   Head: Normocephalic and atraumatic.   Eyes: Conjunctivae are normal. Pupils are equal, round, and reactive to light.   Neck: No thyromegaly present.   RIJ trialysis   Cardiovascular: Regular rhythm.    Tachycardic   Pulmonary/Chest:   Intubated and ventilated  Breath sounds are slightly decreased right base. Coarse bilaterally   Abdominal: Soft. Bowel sounds are normal.   Musculoskeletal:   Trace - 1+ gen edema   Neurological:   Intubated. Arouses to voice and nods head appropriately   Skin: Skin is warm and dry. Capillary refill takes less than 2 seconds. There is pallor.       Significant Labs:  CBC:    Recent Labs  Lab 03/27/18  2018 03/28/18  0357 03/28/18  0813   WBC 3.56* 5.05 4.02   RBC 2.30* 2.51* 2.24*   HGB 6.7* 7.7* 6.7*   HCT 22.1* 24.2* 21.6*    204 183   MCV 96 96 96   MCH 29.1 30.7 29.9   MCHC 30.3* 31.8* 31.0*     BNP:  No results for input(s): BNP in the last 168 hours.    Invalid input(s): BNPTRIAGELBLO  CMP:    Recent Labs  Lab 03/26/18  0404  03/27/18  0311 03/27/18  1502 03/27/18  2157 03/28/18  0356 03/28/18  0813   *  215*  < > 171*  171*  171* 229* 130*  130*  130* 118*  118*  118*  --    CALCIUM 8.1*  8.1*  < > 8.1*  8.1*  8.1* 8.2* 8.3*  8.3*  8.3* 8.3*  8.3*  8.3*  --     ALBUMIN 2.4*  2.4*  < > 2.2*  2.2*  2.2* 2.1* 2.2*  2.2*  2.2* 2.4*  2.4*  2.4*  --    PROT 6.0  --  6.0  --   --  6.2  --      141  < > 142  142  142 143 143  143  143 143  143  143  --    K 4.8  4.8  < > 4.7  4.7  4.7 4.3 4.7  4.7  4.7 4.8  4.8  4.8 4.7   CO2 21*  21*  < > 24  24  24 27 25  25  25 24  24  24  --      107  < > 108  108  108 106 107  107  107 108  108  108  --    BUN 32*  32*  < > 10  10  10 13 12  12  12 7  7  7  --    CREATININE 2.1*  2.1*  < > 0.8  0.8  0.8 0.8 0.7  0.7  0.7 0.6  0.6  0.6  --    ALKPHOS 204*  --  209*  --   --  231*  --    ALT 24  --  24  --   --  29  --    AST 32  --  35  --   --  42*  --    BILITOT 1.2*  --  1.1*  --   --  1.2*  --    < > = values in this interval not displayed.   Coagulation:     Recent Labs  Lab 03/23/18  1550  03/26/18  1659 03/26/18 2017 03/27/18  0801   INR 1.4*  < > 1.0 1.0 1.0   APTT 25.5  --   --   --   --    < > = values in this interval not displayed.  LDH:  No results for input(s): LDH in the last 72 hours.  Microbiology:  Microbiology Results (last 7 days)     Procedure Component Value Units Date/Time    Fungus culture [[id number]] Collected:  03/14/18 1137    Order Status:  Completed Specimen:  Bronchial Wash from Amniotic Fluid Updated:  03/28/18 0933     Fungus (Mycology) Culture Culture in progress     Fungus (Mycology) Culture No fungus isolated after 2 weeks    Blood culture [[id number]] Collected:  03/25/18 1554    Order Status:  Completed Specimen:  Blood from Peripheral, Upper Arm, Right Updated:  03/27/18 1812     Blood Culture, Routine No Growth to date     Blood Culture, Routine No Growth to date     Blood Culture, Routine No Growth to date    Blood culture [[id number]] Collected:  03/25/18 1606    Order Status:  Completed Specimen:  Blood from Peripheral, Forearm, Left Updated:  03/27/18 1812     Blood Culture, Routine No Growth to date     Blood Culture, Routine No Growth to  date     Blood Culture, Routine No Growth to date    Culture, Respiratory with Gram Stain [[id number]] Collected:  03/25/18 1534    Order Status:  Completed Specimen:  Respiratory from Sputum, Induced Updated:  03/27/18 0739     Respiratory Culture Normal respiratory hank     Gram Stain (Respiratory) <10 epithelial cells per low power field.     Gram Stain (Respiratory) Rare WBC's     Gram Stain (Respiratory) Rare Gram positive cocci    Blood culture [[id number]] Collected:  03/21/18 0815    Order Status:  Completed Specimen:  Blood from Peripheral, Forearm, Left Updated:  03/26/18 1012     Blood Culture, Routine No growth after 5 days.    Urine culture [[id number]]     Order Status:  Canceled Specimen:  Urine     Blood culture [[id number]] Collected:  03/18/18 0748    Order Status:  Completed Specimen:  Blood from Peripheral, Hand, Right Updated:  03/23/18 1012     Blood Culture, Routine No growth after 5 days.    Blood culture [[id number]] Collected:  03/18/18 0618    Order Status:  Completed Specimen:  Blood from Peripheral, Hand, Left Updated:  03/21/18 1108     Blood Culture, Routine Gram stain aer bottle: Gram positive cocci in clusters resembling Staph      Blood Culture, Routine Results called to and read back by:Omid Cody RN 03/19/2018  10:55     Blood Culture, Routine --     COAGULASE-NEGATIVE STAPHYLOCOCCUS SPECIES  Organism is a probable contaminant            I have reviewed all pertinent labs within the past 24 hours.    Estimated Creatinine Clearance: 146.9 mL/min (based on SCr of 0.6 mg/dL).    Diagnostic Results:  I have reviewed all pertinent imaging results/findings within the past 24 hours.    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 Most recently has had ongoing issues with his lungs, had gotten 2  thoracenteses, after which he underwent VATS 01/19/18 followed by pleurex catheter placement and effusion drainage 01/11/18, subsequently had it removed 02/07/18 after drainage had decreased despite multiple attempts to drain it. Has been having significant coughing fits that result in him being near-syncopal at times, although difficult to say if he has passed out or not- patient most recently was admitted to the hospital for increased AGUILAR, coughing and pre-syncope 02/11-02/14/18 at [facility].   Patient was started on antibiotics for suspected bacterial infection, with some diarrhea, bronchitis, and possible pneumonia. Ct chest showed some pleural fluid collection and after talking with Dr. James, we arranged for him to receive a diagnostic tap with IR, from which the fluid collection demonstrated no growth or significant findings at all really. Cell counts do not appear to have been sent but will recheck. Patient states since his hospital stay, yesterday had a significant coughing fit again, after which he nearly passed out, is being seen in clinic today for this. Denies any cardiopulmonary complaints, no leg swelling, has some abdominal swelling, which is moderate for him. Denies significant shortness of breath with mild exertion, had 6MWT yesterday to see if he qualified for home O2, and his O2 sats actually improved after recovery from where he started initially. He and his wife admit he is in bed most days, not very active.     Mr. Crocker presented to the ED 3/11/18 with approximately 3 weeks of worsening diarrhea and 2-3 days of sinus pressure, drainage, and worsened cough.  He notes that he had a coughing fit last night and passed out, coughed throughout most of the night.  This also happened on 2/25/18.  He last saw Dr Ferreira in clinic on 2/20/18 where he was taken off myfortic and switched to cellcept (he hadn't been on cellcept because of leukopenia when they tried post-transplant).  Though his diarrhea  had improved after his last discharge, he states it has increased now to 15x/day, clear/yellow/green, no fevers, no exacerbating foods per say.  He was taken back off the cellcept and placed back on myfortic this past week and has not noticed immediate change in diarrhea. He also reports his baseline coughing fits havent improved and are minimally responsive to tessalon pearls.  Came on last night, he lost consciousness during a fit once which is relatively normal for him, and had two more during HPI.  He notes no sick contacts but does state he's had some URI symptoms as above.  His baseline vitals are usually -120 and BP 90s/60s-110s/70s.  He reports a history of intermittent diarrhea - did have Cdiff many years ago but this smells different.  No abdominal pain, no nausea, no vomiting.  No blood in diarrhea.  Appears last c-scope in 2015 with no evidence of infection or inflammatory processes.  He does report IBS which is different that this.       * Acute respiratory failure with hypoxia    -S/P Bronch and intubation 3/14. Cultures ngtd. To have repeat bronch and trach placement today  -CT scan 3/27 showed areas of consolidation on left lung have disappeared which goes against an infective process. Large pleural effusion on right with consolidation/air bronchograms - Thoracic Surgery plans to address this effusion during bronch today    -RSV positive. Completed course of Ribavarin/IVIG. Per lung transplant protocol for severe RSV(given myelosuppression).   -Aspergill Ag neg from BAL.   -Urine histo/blasto antigens neg, crypto antigen neg, urine legionella neg, and Quantiferon pending.  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 Continues to fail SBP - plan for trach today as above. Pulmonology managing vent        Fever    - Afebilre past 24 hours. Blood and urine cultures 3/25/18 with NGTD, sputum culture -ve  - CT scan 3/27 showed areas of consolidation on left lung have disappeared which goes against an infective process. Large pleural effusion on right with consolidation/air bronchograms - Thoracic Surgery plans to address this effusion during bronch today  - Continue Posaconazole, Meropenem and Zyvox per ID rec        Hypotension    - Require resumation of Levo over weekend 2/2 hypotension (2/2 sepsis versus stopping steroids?), but is now off        Restrictive cardiomyopathy    -S/P thoracentesis X 2, followed by VATS/pleurex cath placement (January 2018) with removal of pleurex (February 2018) and 3rd thoracentesis 2/14/18 with no significant findings on fluid removal. Moderate right pleural effusion stable by CXR 3/11/18 and chest CT 3/13/18. CT 3/27/18 as above  -Last paracentesis was in January. Abd US 3/12/18 confirmed ongoing ascites (not tense at all). Abdominal US 3/26 showed small patchy areas of ascites, so Thoracic Surgery is not going to place PEG. Likely have to consult IR for feeding tube placement           Heart transplanted    - TTE 3/26/18 showed normal graft function  - Had -ve DSE on 11/30/17  - As he was on Pred 2.5 mg qd at home and he became hypotensive over the weekend (Hydrocortisone was completed 3/23),  resumed at 50 mg IV every 8 hours 3/26/18  - Tacro level 7.8 (goal 6-9). Continue Tacro 1 mg bid  - Cellcept remains on hold        Acute renal failure superimposed on stage 4 chronic kidney disease    - Appreciate Neph Cx.   - Tolerating UF of 450 without hypotension. CVP 6-8  - Anuric        Ileus    -Having bowel  movements  -No residuals now. Continue tube feeds with goal 50 cc/hr  -Unable to place PEG 2/2 ascites as above. Will likely have to consult IR for feeding tube placement          Type 1 diabetes mellitus with stage 3 chronic kidney disease    - Appreciate Endocrine's recs        Hypothyroidism due to Hashimoto's thyroiditis    - TSH low at 0.101 with normal FT4. Appreciate Endocrine's recs        DIC (disseminated intravascular coagulation)    - resolved at the moment  - secondary to sepsis on admission  - DIC labs including FSP, platelets negative for ongoing status  - cryoglobulin and FFP to be given if needed to keep fibrinogen above 100 and INR below 1.8  - plateles level at 145 today, INR 1.0  - Continue to check fibrinogen, INR and Plts level q 12 hours.            Pancytopenia    - Appreciate Hem Cx.   -The patient developed thrombocytopenia 5 days after receiving enoxaparin and has a 4T score of 5 indicating a ~ 14% chance of HIT.  The patients HIT panel was positive with OD of 0.431 indicating a 1-5% chance of having HIT. HILDA -ve   -The patients thrombocytopenia could be drug induced as well.   -The patient had a low fibrinogen, elevated INR, shistocytes on peripheral smear and elevated LDH which all point towards DIC. The haptoglobin is elevated which goes against hemolytic anemia,; however, haptoglobin is also an acute phase reactant.  As DIC is the higher on the differential would treat as such by treating underlying cause which is likely sepsis.       -Fibrinogen, INR and plt count every 12 hours.  -Give cryoprecipitate to get fibrinogen over 100.  Would give one unit at a time.  -Give FFP to get the INR below 1.8.  Would give at least two units at a time.  May consider giving the patient Vitamin K given recent use of coumadin.          Type 1 diabetes mellitus with hyperglycemia    - on insulin gtt        Anemia    - Hgb today is stable at 7.7 - continue to monitor        Debility    - PT/OT           Uninterrupted Critical Care/Counseling Time (not including procedures): 60 minutes      Rita Louise NP  Heart Transplant  [facility]

## 2018-03-28 NOTE — ASSESSMENT & PLAN NOTE
- baseline sCr 2.6-3.0 consistent with CKD stage IV  - anuric CACHORRO; likely ischemic ATN from prolonged pre-renal state (diarrhea) in setting of prograf renal vasoconstriction; cannot r/o septic ATN or Prograf toxicity  - SLED started 3/14  - remains anuric  - net negative 1L yesterday; no improvement in CXR. Going for bronch/trach this morning  - will plan to restart RRT after procedure; SCUF with UF goal 350-450cc/hr. Low threshold to switch to SLED for any electrolyte abnormalities.

## 2018-03-28 NOTE — BRIEF OP NOTE
[facility]  Brief Operative Note    SUMMARY     Surgery Date: 3/28/2018     Surgeon(s) and Role:     * Carlitos Cordero MD - Primary     * Blu Justin MD - Resident - Assisting     * Shreya Lopez MD - Resident - Assisting    Assisting Surgeon: None    Pre-op Diagnosis:  Acute respiratory failure with hypoxia [J96.01]    Post-op Diagnosis:  Post-Op Diagnosis Codes:     * Acute respiratory failure with hypoxia [J96.01]    Procedure(s) (LRB):  TRACHEOSTOMY (N/A)  BRONCHOSCOPY-OPERATIVE,FLEXIBLE (N/A)  LAVAGE-ALVEOLAR (N/A)    Anesthesia: General    Description of Procedure: bronchoscopy with BAL, tracheostomy    Description of the findings of the procedure: no purulence on bronchoscopy, no inflammation. BAL and tracheostomy    Estimated Blood Loss: 0 mL         Specimens:   Specimen (12h ago through future)    None

## 2018-03-28 NOTE — PLAN OF CARE
Pulmonary      Pt going for trach today. Off levo and no new issues. Will start vent weaning in am.    Geri Stringer MD  PCCM Fellow

## 2018-03-29 PROBLEM — K56.7 ILEUS: Status: RESOLVED | Noted: 2018-01-01 | Resolved: 2018-01-01

## 2018-03-29 PROBLEM — D65 DIC (DISSEMINATED INTRAVASCULAR COAGULATION): Status: RESOLVED | Noted: 2018-01-01 | Resolved: 2018-01-01

## 2018-03-29 PROBLEM — D61.818 PANCYTOPENIA: Status: RESOLVED | Noted: 2018-01-01 | Resolved: 2018-01-01

## 2018-03-29 PROBLEM — D69.6 THROMBOCYTOPENIA: Status: RESOLVED | Noted: 2018-01-01 | Resolved: 2018-01-01

## 2018-03-29 NOTE — ASSESSMENT & PLAN NOTE
- 43 y/o man with a ICM, s/p heart transplant 11/18/14; previously seen by ID on this admission for RSV-A pneumonia/multifocal pneumonia  - ID called back today as patient had fever and was hypotensive overnight requiring vasopressors. Previous infectious work up done during admission reviewed.  - Had bronchoscopy and BAL performed on 3/14/18 with cultures NGTD   - B/C 3/25/18 NGTD  - respiratory culture 3/25/18 NGTD  - CXR 3/26/18: continued right pleural effusion.  Diffuse increase in the pulmonary vascular interstitial and alveolar markings. Focal opacity developing at the left base.  - switched antibiotic therapy to meropenem and linezolid; continue posaconazole for now  - Chest ST with following findings: previously identified areas of consolidation noted in the left lung on prior chest CT on 03/13/2018 appear to have cleared with interval development of multiple new ground-glass opacities in the left upper and lower lobes as well as the right upper lobe, suggestive of edema versus pneumonitis. Large rounded opacity in the right lower lobe with associated air bronchogram that appears to have increased in size since prior examination on 07/19/2016 but appears similar to 03/13/2018 and 02/14/2018. Findings are suggestive of rounded atelectasis in the setting of chronic right-sided pleural effusion  - Improved since last evaluation, off vasopressors scheduled for tracheostomy and PEG today   - As per discussion with team will have bronchoscopy and thoracentesis of right chronic effusion today   - Recommend to continue meropenem and linezolid for 7-10 day course   - Can D/C posaconazole   - cultures NGTD patient condition stable   - S/P trach placement unable to place PEG due to ascites  - Will sign off please call with questions     Recommendations   - continue linezolid and meropenem for 7 day course   - will sign off please call with additional questions

## 2018-03-29 NOTE — PLAN OF CARE
Problem: Patient Care Overview  Goal: Plan of Care Review  Outcome: Ongoing (interventions implemented as appropriate)  POC:    No major changes this shift. Will plan for NGT placed under fluoroscopy tomorrow. SCUF changed to SLED on CRRT. Tolerating trach well.      Resp: vent to trach at 40% FiO2 PEEP of 5. , sats , coarse to diminished breath sounds     CV: ST low 110-120s, no PVCs, no ectopies, SBP: 90s-110s MAP > 60 from a-line. Levo was started at 0949 today, but dc'd at 1634. CVP= 7.     Neuro: Withdrawn, Follows simple commands and nods to questions, Afebrile. PERRLA. Opens eyes spontaneously.     Gastrointestinal: NPO. Plan for NG insertion under fluoroscopy tomorrow morning.     Genitourinary: Anuric, ongoing CRRT at 400 UF, SCUF switched to SLED today on CRRT.     Endocrine: Hourly CBG with sliding insulin drip. Insulin @ 1.3 currently. Blood sugar checks 115-185 this shift.     Lines: Righ trialysis catheter, Right fem a-line, Left UA midline, and Right arm PIV.     Infusions: Insulin @ 1.3, KVO @ 5, and Antibx      Plan of care communicated and reviewed with Patient and spouse. All concerns addressed. Will continue to monitor.

## 2018-03-29 NOTE — PROGRESS NOTES
[facility]  Heart Transplant  Progress Note    Patient Name: Lv Crocker  MRN: [id number]  Admission Date: 3/11/2018  Hospital Length of Stay: 17 days  Attending Physician: Heriberto Rosa MD  Primary Care Provider: Philippe Mohr MD  Principal Problem:Acute respiratory failure with hypoxia    Subjective:     Interval History: Clotted off on CRRT this AM (unable to give back blood). Hypotensive once SLED started requiring low dose levophed. No issues overnight with tracheostomy. Still very weak but following commands. 1 unit of PRBC given today     Continuous Infusions:   sodium chloride 0.9% 200 mL/hr at 03/29/18 1111    dexmedetomidine (PRECEDEX) infusion Stopped (03/21/18 0750)    insulin (HUMAN R) infusion (adults) 0.9 Units/hr (03/29/18 1600)    norepinephrine bitartrate-D5W Stopped (03/29/18 1634)     Scheduled Meds:   chlorhexidine  15 mL Mouth/Throat BID    [START ON 3/30/2018] famotidine (PF)  20 mg Intravenous Daily    heparin (porcine)  5,000 Units Subcutaneous Q12H    hydrocortisone sodium succinate  25 mg Intravenous BID    levothyroxine  125 mcg Per NG tube Before breakfast    linezolid 600mg/300ml  600 mg Intravenous Q12H    meropenem (MERREM) IVPB  2 g Intravenous Q12H    sodium chloride 0.9%  3 mL Intravenous Q8H    tacrolimus  0.5 mg Sublingual BID     PRN Meds:sodium chloride, dextrose 50%, dextrose 50%, dextrose 50%, dextrose 50%, fentaNYL, glucagon (human recombinant), glucose, glucose    Review of patient's allergies indicates:   Allergen Reactions    No known drug allergies      Objective:     Vital Signs (Most Recent):  Temp: 97 °F (36.1 °C) (03/29/18 1500)  Pulse: (!) 122 (03/29/18 1448)  Resp: 17 (03/29/18 1217)  BP: 110/62 (03/29/18 1600)  SpO2: 100 % (03/29/18 1448) Vital Signs (24h Range):  Temp:  [96.2 °F (35.7 °C)-98 °F (36.7 °C)] 97 °F (36.1 °C)  Pulse:  [108-126] 122  Resp:  [14-31] 17  SpO2:  [99 %-100 %] 100 %  BP: ()/(51-79)  110/62  Arterial Line BP: ()/(45-70) 86/46     Patient Vitals for the past 72 hrs (Last 3 readings):   Weight   03/28/18 0400 73.2 kg (161 lb 6 oz)   03/27/18 1400 74.7 kg (164 lb 10.9 oz)   03/27/18 0609 74.7 kg (164 lb 10.9 oz)     Body mass index is 25.28 kg/m².      Intake/Output Summary (Last 24 hours) at 03/29/18 1654  Last data filed at 03/29/18 1631   Gross per 24 hour   Intake          6065.38 ml   Output             8636 ml   Net         -2570.62 ml     Telemetry: ST    Physical Exam   Constitutional: He appears well-developed and well-nourished.   Appears ill   HENT:   Head: Normocephalic and atraumatic.   Eyes: Conjunctivae are normal. Pupils are equal, round, and reactive to light.   Neck: No thyromegaly present.   RIJ trialysis   Tracheostomy    Cardiovascular: Regular rhythm.    Tachycardic   Pulmonary/Chest:   Breath sounds are slightly decreased right base. Coarse bilaterally   Abdominal: Soft. Bowel sounds are normal.   Musculoskeletal:   Trace - 1+ gen edema   Neurological:   Arouses to voice and nods head appropriately   Skin: Skin is warm and dry. Capillary refill takes less than 2 seconds. There is pallor.     Significant Labs:  CBC:    Recent Labs  Lab 03/28/18  1925 03/29/18  0751 03/29/18  1534   WBC 6.80 6.29 5.91   RBC 2.32* 2.30* 2.80*   HGB 7.0* 6.8* 8.7*   HCT 22.9* 22.7* 27.1*    240 212   MCV 99* 99* 97   MCH 30.2 29.6 31.1*   MCHC 30.6* 30.0* 32.1     BNP:  No results for input(s): BNP in the last 168 hours.    Invalid input(s): BNPTRIAGELBLO  CMP:    Recent Labs  Lab 03/27/18  0311  03/28/18  0356  03/28/18  2028 03/29/18  0254 03/29/18  1421   *  171*  171*  < > 118*  118*  118*  < > 156* 167*  167* 167*   CALCIUM 8.1*  8.1*  8.1*  < > 8.3*  8.3*  8.3*  < > 8.7 8.8  8.8 8.3*   ALBUMIN 2.2*  2.2*  2.2*  < > 2.4*  2.4*  2.4*  < > 2.4* 2.6*  2.6* 2.7*   PROT 6.0  --  6.2  --   --  6.7  --      142  142  < > 143  143  143  < > 141 139  139  138   K 4.7  4.7  4.7  < > 4.8  4.8  4.8  < > 4.9 5.4*  5.4* 5.1   CO2 24  24  24  < > 24  24  24  < > 24 19*  19* 22*     108  108  < > 108  108  108  < > 106 106  106 105   BUN 10  10  10  < > 7  7  7  < > 29* 33*  33* 12   CREATININE 0.8  0.8  0.8  < > 0.6  0.6  0.6  < > 1.7* 2.0*  2.0* 0.9   ALKPHOS 209*  --  231*  --   --  216*  --    ALT 24  --  29  --   --  26  --    AST 35  --  42*  --   --  36  --    BILITOT 1.1*  --  1.2*  --   --  1.1*  --    < > = values in this interval not displayed.   Coagulation:     Recent Labs  Lab 03/23/18  1550  03/26/18  1659 03/26/18 2017 03/27/18  0801   INR 1.4*  < > 1.0 1.0 1.0   APTT 25.5  --   --   --   --    < > = values in this interval not displayed.  LDH:  No results for input(s): LDH in the last 72 hours.  Microbiology:  Microbiology Results (last 7 days)     Procedure Component Value Units Date/Time    AFB Culture & Smear [[id number]] Collected:  03/28/18 1607    Order Status:  Completed Specimen:  Body Fluid from Lung, RLL Updated:  03/29/18 1405     AFB CULTURE STAIN No acid fast bacilli seen.    Culture, Anaerobe [[id number]] Collected:  03/28/18 1607    Order Status:  Completed Specimen:  Body Fluid from Lung, RLL Updated:  03/29/18 1329     Anaerobic Culture Culture in progress    Aerobic culture [[id number]] Collected:  03/28/18 1607    Order Status:  Completed Specimen:  Body Fluid from Lung, RLL Updated:  03/29/18 0938     Aerobic Bacterial Culture No growth    Blood culture [[id number]] Collected:  03/25/18 1554    Order Status:  Completed Specimen:  Blood from Peripheral, Upper Arm, Right Updated:  03/28/18 1812     Blood Culture, Routine No Growth to date     Blood Culture, Routine No Growth to date     Blood Culture, Routine No Growth to date     Blood Culture, Routine No Growth to date    Blood culture [[id number]] Collected:  03/25/18 1606    Order Status:  Completed Specimen:  Blood from Peripheral, Forearm, Left Updated:   03/28/18 1812     Blood Culture, Routine No Growth to date     Blood Culture, Routine No Growth to date     Blood Culture, Routine No Growth to date     Blood Culture, Routine No Growth to date    Gram stain [[id number]] Collected:  03/28/18 1607    Order Status:  Completed Specimen:  Body Fluid from Lung, RLL Updated:  03/28/18 1654     Gram Stain Result Rare WBC's      No organisms seen    Fungus culture [[id number]] Collected:  03/28/18 1607    Order Status:  Sent Specimen:  Body Fluid from Lung, RLL Updated:  03/28/18 1617    Fungus culture [[id number]] Collected:  03/14/18 1137    Order Status:  Completed Specimen:  Bronchial Wash from Amniotic Fluid Updated:  03/28/18 0933     Fungus (Mycology) Culture Culture in progress     Fungus (Mycology) Culture No fungus isolated after 2 weeks    Culture, Respiratory with Gram Stain [[id number]] Collected:  03/25/18 1534    Order Status:  Completed Specimen:  Respiratory from Sputum, Induced Updated:  03/27/18 0739     Respiratory Culture Normal respiratory hank     Gram Stain (Respiratory) <10 epithelial cells per low power field.     Gram Stain (Respiratory) Rare WBC's     Gram Stain (Respiratory) Rare Gram positive cocci    Blood culture [[id number]] Collected:  03/21/18 0815    Order Status:  Completed Specimen:  Blood from Peripheral, Forearm, Left Updated:  03/26/18 1012     Blood Culture, Routine No growth after 5 days.    Urine culture [[id number]]     Order Status:  Canceled Specimen:  Urine     Blood culture [[id number]] Collected:  03/18/18 0748    Order Status:  Completed Specimen:  Blood from Peripheral, Hand, Right Updated:  03/23/18 1012     Blood Culture, Routine No growth after 5 days.        I have reviewed all pertinent labs within the past 24 hours.    Estimated Creatinine Clearance: 97.9 mL/min (based on SCr of 0.9 mg/dL).    Diagnostic Results:  I have reviewed all pertinent imaging results/findings within the past 24 hours.    Assessment and Plan:        * Acute respiratory failure with hypoxia    -S/P Bronch and intubation 3/14. Cultures ngtd.   - Awaiting culture results from BAL s/p repeat bronch 3/28/18   - Tracheostomy appears C/D/I. Pulm to help with vent wean / management.   - Thoracic surgery following. Appreciate recs   - RSV positive. Completed course of Ribavarin/IVIG. Per lung transplant protocol for severe RSV(given myelosuppression).   - ID following. Appreciate recs.   - IR to place cor edgar in the AM         Heart transplanted    - TTE 3/26/18 showed normal graft function  - Continue hydrocortisone 25mg BID (titrating down) with plan to start pred 10mg daily once enteral access is gained.  - Continue Tacro 1 mg bid (giving half dose sublingually until enteral access)   - Cellcept remains on hold        Type 1 diabetes mellitus with stage 3 chronic kidney disease    - Appreciate Endocrine's recs        Hypothyroidism due to Hashimoto's thyroiditis    - Appreciate Endocrine's recs  - Holding levothyroxine for now pending enteral access   - Would need IV replacement if no enteral access by saturday        Hypotension    - Levophed (titrating to MAP >65) restarted this AM when CRRT switched to SLED        Restrictive cardiomyopathy    -S/P thoracentesis X 2, followed by VATS/pleurex cath placement (January 2018) with removal of pleurex (February 2018) and 3rd thoracentesis 2/14/18 with no significant findings on fluid removal. Moderate right pleural effusion stable by CXR 3/11/18 and chest CT 3/13/18. CT 3/27/18 as above          Acute renal failure superimposed on stage 4 chronic kidney disease    - Appreciate Nephrology recs.   - Switched to SLED this AM   - Low dose norepi with MAP goal of 65         Anemia    - Hgb today is 6.8  - Will transfuse 1 unit and re-evaluate         Fever    - ID following   - Continue Meropenem and Zyvox per ID rec  - Will hold SSRI for now         Debility    - PT/OT            Mamadou rhodes, DO  Heart  Transplant  [facility]

## 2018-03-29 NOTE — SUBJECTIVE & OBJECTIVE
Interval History:   Trach done yesterday in the OR, no complications  Doing well this AM, on minimal vent settings    Medications:  Continuous Infusions:   sodium chloride 0.9% 200 mL/hr at 03/28/18 1737    dexmedetomidine (PRECEDEX) infusion Stopped (03/21/18 0750)    insulin (HUMAN R) infusion (adults) 2.3 Units/hr (03/29/18 0600)    norepinephrine bitartrate-D5W Stopped (03/26/18 1320)     Scheduled Meds:   chlorhexidine  15 mL Mouth/Throat BID    docusate  100 mg Per NG tube BID    escitalopram oxalate  20 mg Oral Daily    famotidine (PF)  20 mg Intravenous BID    heparin (porcine)  5,000 Units Subcutaneous Q12H    hydrocortisone sodium succinate  50 mg Intravenous BID    levothyroxine  125 mcg Per NG tube Before breakfast    linezolid 600mg/300ml  600 mg Intravenous Q12H    meropenem (MERREM) IVPB  2 g Intravenous Q12H    metoclopramide HCl  5 mg Intravenous Q6H    polyethylene glycol  17 g Per NG tube Daily    sodium chloride 0.9%  3 mL Intravenous Q8H    tacrolimus  1 mg Per NG tube BID     PRN Meds:acetaminophen, dextrose 50%, dextrose 50%, dextrose 50%, dextrose 50%, glucagon (human recombinant), glucose, glucose, insulin aspart U-100, k phos di & mono-sod phos mono     Review of patient's allergies indicates:   Allergen Reactions    No known drug allergies      Objective:     Vital Signs (Most Recent):  Temp: 97.5 °F (36.4 °C) (03/29/18 0300)  Pulse: (!) 111 (03/29/18 0600)  Resp: (!) 23 (03/29/18 0600)  BP: (!) 105/52 (03/29/18 0600)  SpO2: 100 % (03/29/18 0600) Vital Signs (24h Range):  Temp:  [97.5 °F (36.4 °C)-98.6 °F (37 °C)] 97.5 °F (36.4 °C)  Pulse:  [108-133] 111  Resp:  [0-31] 23  SpO2:  [97 %-100 %] 100 %  BP: ()/(51-79) 105/52  Arterial Line BP: ()/(45-70) 97/46     Intake/Output - Last 3 Shifts       03/27 0700 - 03/28 0659 03/28 0700 - 03/29 0659 03/29 0700 - 03/30 0659    I.V. (mL/kg) 3183.3 (43.5) 2761.7 (37.7)     NG/GT 1230 240     IV Piggyback 700 477      Total Intake(mL/kg) 5113.3 (69.9) 3801.7 (51.9)     Other 6247 5295     Stool       Total Output 6247 5295      Net -1133.7 -1493.3             Stool Occurrence  1 x           SpO2: 100 %  O2 Device (Oxygen Therapy): ventilator    Physical Exam   HENT:   Head: Normocephalic and atraumatic.   Neck: No thyromegaly present.   Trach in place   Cardiovascular: Regular rhythm and intact distal pulses.  Tachycardia present.    No murmur heard.  Pulmonary/Chest: He has decreased breath sounds in the right lower field and the left lower field.   Abdominal: Soft. Bowel sounds are normal. He exhibits no distension. There is no tenderness.   Musculoskeletal: He exhibits no edema.   Neurological: He is alert.   Vitals reviewed.      Significant Labs:  All pertinent labs from the last 24 hours have been reviewed.    Significant Diagnostics:  I have reviewed and interpreted all pertinent imaging results/findings within the past 24 hours.    VTE Risk Mitigation         Ordered     heparin (porcine) injection 5,000 Units  Every 12 hours     Route:  Subcutaneous        03/23/18 1599

## 2018-03-29 NOTE — SUBJECTIVE & OBJECTIVE
"Interval HPI:   Overnight events: Remains NPO. No NG in place for tube feeds; received trach yesterday. Possible PEG vs. NG replacement today. Remains on intensive insulin gtt with q1    BP (!) 101/53 (BP Location: Right arm, Patient Position: Lying)   Pulse (!) 117   Temp 96.8 °F (36 °C) (Axillary)   Resp 17   Ht 5' 7" (1.702 m)   Wt 73.2 kg (161 lb 6 oz)   SpO2 100%   BMI 25.28 kg/m²     Labs Reviewed and Include      Recent Labs  Lab 03/29/18  0254   *  167*   CALCIUM 8.8  8.8   ALBUMIN 2.6*  2.6*   PROT 6.7     139   K 5.4*  5.4*   CO2 19*  19*     106   BUN 33*  33*   CREATININE 2.0*  2.0*   ALKPHOS 216*   ALT 26   AST 36   BILITOT 1.1*     Lab Results   Component Value Date    WBC 6.29 03/29/2018    HGB 6.8 (L) 03/29/2018    HCT 22.7 (L) 03/29/2018    MCV 99 (H) 03/29/2018     03/29/2018     No results for input(s): TSH, FREET4 in the last 168 hours.  Lab Results   Component Value Date    HGBA1C 6.9 (H) 02/11/2018       Nutritional status:   Body mass index is 25.28 kg/m².  Lab Results   Component Value Date    ALBUMIN 2.6 (L) 03/29/2018    ALBUMIN 2.6 (L) 03/29/2018    ALBUMIN 2.4 (L) 03/28/2018     Lab Results   Component Value Date    PREALBUMIN 5 (L) 03/15/2018    PREALBUMIN 18 (L) 03/24/2015    PREALBUMIN 19 (L) 12/17/2014       Estimated Creatinine Clearance: 44.1 mL/min (A) (based on SCr of 2 mg/dL (H)).    Accu-Checks  Recent Labs      03/29/18   0401  03/29/18   0509  03/29/18   0622  03/29/18   0624  03/29/18   0748  03/29/18   0825  03/29/18   0903  03/29/18   1004  03/29/18   1108  03/29/18   1204   POCTGLUCOSE  181*  185*  247*  232*  228*  182*  175*  116*  118*  130*       Current Medications and/or Treatments Impacting Glycemic Control  Immunotherapy:  Immunosuppressants         Stop Route Frequency     tacrolimus (PROGRAF) 1 mg/mL oral syringe      -- PER NG TUBE 2 times daily        Steroids:   Hormones     Start     Stop Route Frequency Ordered    " 03/30/18 0900  predniSONE tablet 10 mg      -- Oral Daily 03/29/18 1006    03/28/18 2100  hydrocortisone sodium succinate injection 50 mg      03/30 0859 IV 2 times daily 03/28/18 1249        Pressors:    Autonomic Drugs     Start     Stop Route Frequency Ordered    03/29/18 1045  norepinephrine 4 mg in dextrose 5% 250 mL infusion (premix) (titrating)     Question Answer Comment   Titrate by: (in mcg/kg/min) 0.02    Titrate interval: (in minutes) 15    Titrate to maintain: (MAP or SBP) MAP    Greater than: (in mmHg) 65    Maximum dose: (in mcg/kg/min) 2        -- IV Continuous 03/29/18 0945        Hyperglycemia/Diabetes Medications: Antihyperglycemics     Start     Stop Route Frequency Ordered    03/28/18 0245  insulin regular (Humulin R) 100 Units in sodium chloride 0.9% 100 mL infusion     Question:  Insulin Rate Adjustment (DO NOT MODIFY ANSWER)  Answer:  [email or web address]    -- IV Continuous 03/28/18 0144

## 2018-03-29 NOTE — ASSESSMENT & PLAN NOTE
- baseline sCr 2.6-3.0 consistent with CKD stage IV  - anuric CACHORRO; likely ischemic ATN from prolonged pre-renal state (diarrhea) in setting of prograf renal vasoconstriction; cannot r/o septic ATN or Prograf toxicity  - SLED started 3/14  - remains anuric  - net negative 1.7L yesterday with SCUF. CXR improved. Mild hyperkalemia and metabolic acidosis this AM  - change to SLED today for metabolic clearance. Decrease UF goal to match I/O. Will hold overnight and reassess in AM. Anticipating 12-hour treatments in the future.

## 2018-03-29 NOTE — ASSESSMENT & PLAN NOTE
-S/P thoracentesis X 2, followed by VATS/pleurex cath placement (January 2018) with removal of pleurex (February 2018) and 3rd thoracentesis 2/14/18 with no significant findings on fluid removal. Moderate right pleural effusion stable by CXR 3/11/18 and chest CT 3/13/18. CT 3/27/18 as above

## 2018-03-29 NOTE — SUBJECTIVE & OBJECTIVE
Interval History: RRT held most of day yesterday. Underwent trach placement around 5pm. SCUF started afterwards with UF 450cc/hr. No events overnight. Switched to SLED this morning; mild drop in BP and UF decreased to 350cc/hr.   Net negative 2L yesterday. Patient working with PT/OT this morning. Sitting on side of bed. Appears well.     Review of patient's allergies indicates:   Allergen Reactions    No known drug allergies      Current Facility-Administered Medications   Medication Frequency    norepinephrine bitartrate-D5W 4 mg/250 mL (16 mcg/mL) infusion Soln     0.9%  NaCl infusion (CRRT USE ONLY) Continuous    acetaminophen oral solution 650 mg Q4H PRN    chlorhexidine 0.12 % solution 15 mL BID    dexmedetomidine (PRECEDEX) 400mcg/100mL 0.9% NaCL infusion Continuous    dextrose 50% injection 12.5 g PRN    dextrose 50% injection 12.5 g PRN    dextrose 50% injection 25 g PRN    dextrose 50% injection 25 g PRN    docusate 50 mg/5 mL liquid 100 mg BID    famotidine (PF) injection 20 mg BID    fentaNYL injection 50 mcg Q1H PRN    glucagon (human recombinant) injection 1 mg PRN    glucose chewable tablet 16 g PRN    glucose chewable tablet 24 g PRN    heparin (porcine) injection 5,000 Units Q12H    hydrocortisone sodium succinate injection 50 mg BID    insulin regular (Humulin R) 100 Units in sodium chloride 0.9% 100 mL infusion Continuous    k phos di & mono-sod phos mono 250 mg tablet 2 tablet Q4H PRN    levothyroxine tablet 125 mcg Before breakfast    linezolid 600mg/300ml IVPB 600 mg Q12H    meropenem (MERREM) 2 g in sodium chloride 0.9% 100 mL IVPB Q12H    metoclopramide HCl injection 5 mg Q6H    norepinephrine 4 mg in dextrose 5% 250 mL infusion (premix) (titrating) Continuous    polyethylene glycol packet 17 g Daily    sodium chloride 0.9% flush 3 mL Q8H    tacrolimus (PROGRAF) 1 mg/mL oral syringe BID       Objective:     Vital Signs (Most Recent):  Temp: 96.2 °F (35.7 °C)  (03/29/18 0700)  Pulse: (!) 116 (03/29/18 0908)  Resp: (!) 23 (03/29/18 0600)  BP: (!) 105/52 (03/29/18 0600)  SpO2: 100 % (03/29/18 0908)  O2 Device (Oxygen Therapy): ventilator (03/29/18 0908) Vital Signs (24h Range):  Temp:  [96.2 °F (35.7 °C)-98.6 °F (37 °C)] 96.2 °F (35.7 °C)  Pulse:  [108-133] 116  Resp:  [0-31] 23  SpO2:  [97 %-100 %] 100 %  BP: ()/(51-79) 105/52  Arterial Line BP: ()/(45-70) 97/46     Weight: 73.2 kg (161 lb 6 oz) (03/28/18 0400)  Body mass index is 25.28 kg/m².  Body surface area is 1.86 meters squared.    I/O last 3 completed shifts:  In: 5987.9 [I.V.:4397.9; NG/GT:490; IV Piggyback:1100]  Out: 9059 [Other:9059]    Physical Exam   Constitutional: He appears well-developed and well-nourished. No distress.   HENT:   Head: Normocephalic and atraumatic.   Eyes: Conjunctivae and EOM are normal.   Cardiovascular: Regular rhythm.  Tachycardia present.    Pulmonary/Chest: Effort normal. No respiratory distress.   Abdominal: Soft. He exhibits no distension.   Musculoskeletal: He exhibits edema. He exhibits no deformity.   Skin: Skin is warm and dry. He is not diaphoretic.       Significant Labs:  CBC:   Recent Labs  Lab 03/29/18  0751   WBC 6.29   RBC 2.30*   HGB 6.8*   HCT 22.7*      MCV 99*   MCH 29.6   MCHC 30.0*     CMP:   Recent Labs  Lab 03/29/18  0254   *  167*   CALCIUM 8.8  8.8   ALBUMIN 2.6*  2.6*   PROT 6.7     139   K 5.4*  5.4*   CO2 19*  19*     106   BUN 33*  33*   CREATININE 2.0*  2.0*   ALKPHOS 216*   ALT 26   AST 36   BILITOT 1.1*     All labs within the past 24 hours have been reviewed.     Significant Imaging:  Labs: Reviewed  X-Ray: Reviewed

## 2018-03-29 NOTE — PT/OT/SLP PROGRESS
Occupational Therapy   Treatment    Name: Lv Crocker  MRN: [id number]  Admitting Diagnosis:  Acute respiratory failure with hypoxia  1 Day Post-Op    Recommendations:     Discharge Recommendations:  LTAC    Subjective     Communicated with: nsg prior to session.  Pain/Comfort:  · Pain Rating 1:  (pt grimacing at times but unable to communicate location of pain. )  · Pain Addressed 1: Reposition, Distraction, Pre-medicate for activity, Nurse notified  Nsg provided IV pain meds immediately prior to therapy.     Patients cultural, spiritual, Episcopal conflicts given the current situation: none reported     Objective:     Patient found supine in bed with spouse present. Pt now with trach to vent       General Precautions: Standard, fall   Orthopedic Precautions:N/A     Occupational Performance:    Bed Mobility:    · Patient completed Supine to Sit with total assistance and 2 persons  · Patient completed Sit to Supine with total assistance and 2 persons       Activities of Daily Living:  · TOTAL A for all ADL's this date.     Patient left supine with all lines intact, call button in reach, nsg notified and fly present    [facility] 6 Click:  [facility] Total Score: 6    Treatment & Education:  Pt tolerated sitting EOB x 8 min with zero sitting balance. CRRT via right IJ in place with no pressors on board. Dialysis team contacted and agreed pt appropriate for EOB activity.  Pt required full assist to obtain and maintain upright cervical positioning.  Pt with eyes open 75% of session with increased lethargy noted as session continued.  VSS remained stable throughout session.  OT provided PROM exs B UE within PNF patterns to promote normal mm patterns. Pt needing constant cues to stay focused to exs with limited engagement noted with exs.  OT provided education to pt's/spouse re: current POC and importance of continued PROM. Spouse verb understanding.  TOTAL A x 2 trials for rolling in bed to adjust linens with bed  positioned in chair position to allow for more upright positioning in bed with pillows use to support B UE's.   Education:    Assessment:     Lv Crocker is a 42 y.o. male with a medical diagnosis of Acute respiratory failure with hypoxia.  .  Performance deficits affecting function are weakness, impaired functional mobilty, gait instability, impaired self care skills, impaired endurance, impaired balance, decreased upper extremity function, decreased lower extremity function, decreased coordination.  Pt tolerated session fairly well. VSS throughout session and pt cooperative. Pt continues to require TOTAL  A for all ADL's and mobility at this time with no functional use of UE's.     Rehab Prognosis:  Good ; patient would benefit from acute skilled OT services to address these deficits and reach maximum level of function.       Plan:     Patient to be seen 5 x/week to address the above listed problems via self-care/home management, therapeutic exercises, therapeutic activities  · Plan of Care Expires: 04/23/18  · Plan of Care Reviewed with: patient, spouse    This Plan of care has been discussed with the patient who was involved in its development and understands and is in agreement with the identified goals and treatment plan    GOALS:    Occupational Therapy Goals        Problem: Occupational Therapy Goal    Goal Priority Disciplines Outcome Interventions   Occupational Therapy Goal     OT, PT/OT Ongoing (interventions implemented as appropriate)    Description:  Goals to be met by: 4/7/2018    Pt will follow 75% of motor commands with <2 verbal cues for repetition of task to maximize engagement in grooming task.  Pt will complete feeding with mod A.   Pt will complete supine with HOB slightly elevated to seated at EOB with mod A in prep for seated grooming task.  Pt will engage in BUE exercises in orders to increase strength to 3+/5 in BUE's to increase engagement in seated grooming task  Pt will sit  at EOB for approx 10 minutes with mod A and unilateral UE support to complete grooming task  Pt will complete sit>stand from EOB with bed in lowest position with mod A in prep for transfer to [facility]                    Time Tracking:     OT Date of Treatment: 03/29/18  OT Start Time: 0845  OT Stop Time: 0920  OT Total Time (min): 35 min    Billable Minutes:Therapeutic Activity 15  Therapeutic Exercise 10    AMADOU Villanueva  3/29/2018

## 2018-03-29 NOTE — PROGRESS NOTES
[facility]  Pulm/Critical Care - Medicine  Progress Note    Patient Name: Lv Crocker  MRN: [id number]  Admission Date: 3/11/2018  Hospital Length of Stay: 17 days  Code Status: Full Code  Attending Provider: Heriberto Rosa MD  Primary Care Provider: Philippe Mohr MD   Principal Problem: Acute respiratory failure with hypoxia    Subjective:   S/P trach yest. Doing well this am. Smiling and able to answer questions with facial gestures.  Review of Systems   Unable to obtain  Objective:     Vital Signs (Most Recent):  Temp: 96.8 °F (36 °C) (03/29/18 1217)  Pulse: (!) 119 (03/29/18 1217)  Resp: 17 (03/29/18 1217)  BP: (!) 101/53 (03/29/18 1000)  SpO2: 100 % (03/29/18 1217) Vital Signs (24h Range):  Temp:  [96.2 °F (35.7 °C)-98.2 °F (36.8 °C)] 96.8 °F (36 °C)  Pulse:  [108-133] 119  Resp:  [0-31] 17  SpO2:  [99 %-100 %] 100 %  BP: ()/(51-79) 101/53  Arterial Line BP: ()/(45-70) 86/46     Weight: 73.2 kg (161 lb 6 oz)  Body mass index is 25.28 kg/m².      Intake/Output Summary (Last 24 hours) at 03/29/18 1257  Last data filed at 03/29/18 1217   Gross per 24 hour   Intake          4942.91 ml   Output             7017 ml   Net         -2074.09 ml       Physical Exam   Constitutional: He appears well-developed.    On mechanical ventilation. Follows commands. Ext weak   HENT:   Head: Normocephalic and atraumatic.   Neck: Neck supple.   Trach in place, site clean and dry   Cardiovascular: Regular rhythm.    tachycardic   Pulmonary/Chest: Effort normal.   On the vent--mechanical BS heard   Abdominal: Soft.   Neurological: He is alert.   Awake and follows some commands   Skin: Skin is warm and dry.       Vents:  Vent Mode: Spont (03/29/18 1114)  Ventilator Initiated: Yes (03/14/18 0932)  Set Rate: 18 bmp (03/29/18 0722)  Vt Set: 410 mL (03/29/18 0722)  Pressure Support: 10 cmH20 (03/29/18 1114)  PEEP/CPAP: 5 cmH20 (03/29/18 1114)  Oxygen Concentration (%): 40 (03/29/18 1114)  Peak  Airway Pressure: 15 cmH2O (03/29/18 1114)  Plateau Pressure: 20 cmH20 (03/29/18 0313)  Total Ve: 7.74 mL (03/29/18 1114)  F/VT Ratio<105 (RSBI): (!) 79.08 (03/29/18 0533)    Lines/Drains/Airways     Central Venous Catheter Line                 Trialysis (Dialysis) Catheter 03/23/18 1433 right internal jugular 5 days          Airway                 Surgical Airway 03/28/18 1625 Shiley less than 1 day          Arterial Line                 Arterial Line 03/14/18 1600 Right Femoral 14 days          Peripheral Intravenous Line                 Midline Catheter Insertion/Assessment  - Single Lumen 03/17/18 1454 Left cephalic vein (lateral side of arm) 18g x 8cm 11 days         Peripheral IV - Single Lumen 03/20/18 1640 Right Forearm 8 days                Significant Labs:    CBC/Anemia Profile:    Recent Labs  Lab 03/28/18  0813 03/28/18  1925 03/29/18  0751   WBC 4.02 6.80 6.29   HGB 6.7* 7.0* 6.8*   HCT 21.6* 22.9* 22.7*    246 240   MCV 96 99* 99*   RDW 21.6* 21.8* 21.6*        Chemistries:    Recent Labs  Lab 03/28/18  0356  03/28/18  1417 03/28/18  1747 03/28/18  2028 03/29/18  0059 03/29/18  0254 03/29/18  0751     143  143  --  143  143  143  --  141  --  139  139  --    K 4.8  4.8  4.8  < > 3.7  3.7  3.7  --  4.9  --  5.4*  5.4*  --      108  108  --  117*  117*  117*  --  106  --  106  106  --    CO2 24  24  24  --  19*  19*  19*  --  24  --  19*  19*  --    BUN 7  7  7  --  19  19  19  --  29*  --  33*  33*  --    CREATININE 0.6  0.6  0.6  --  0.9  0.9  0.9  --  1.7*  --  2.0*  2.0*  --    CALCIUM 8.3*  8.3*  8.3*  --  6.5*  6.5*  6.5*  --  8.7  --  8.8  8.8  --    ALBUMIN 2.4*  2.4*  2.4*  --  1.7*  1.7*  1.7*  --  2.4*  --  2.6*  2.6*  --    PROT 6.2  --   --   --   --   --  6.7  --    BILITOT 1.2*  --   --   --   --   --  1.1*  --    ALKPHOS 231*  --   --   --   --   --  216*  --    ALT 29  --   --   --   --   --  26  --    AST 42*  --   --   --   --    --  36  --    MG  --   < >  --  1.5*  1.5*  --  1.9  --  1.8  1.8   PHOS 1.8*  1.8*  --  2.4*  2.4*  2.4*  --  3.7  --  4.7*  --    < > = values in this interval not displayed.        Assessment/Plan:   1. Acute on chronic hypoxemic resp failure      42 year-old male with a PMH of a heart transplant who was admitted to the hospital on 3/11 for diarrhea and cough. He was intubated on 3/14 for hypoxemic and hypercapnic resp failure. Because of critical illness weakness that could be related to the steroids, benzos and Ribavirin he could not be liberated from the vent. Thus, trach was performed yest. Doing well on a PS 10/5 todayPlan:    -Cont PS at 10/5 today as tolerated. Back on assist control as needed and nightly  -Antibiotic recs per ID  -CRRT  -Levophed (on and off). Maintain MAP > 65  -Aggressive PT.  -DVT prophylaxis    Doing better overall. Hopefully, in the next few day we will progress to trach collar.  Geri Stringer MD  Critical Care - Medicine  [facility]

## 2018-03-29 NOTE — ASSESSMENT & PLAN NOTE
- Appreciate Nephrology recs.   - Switched to SLED this AM   - Low dose norepi with MAP goal of 65

## 2018-03-29 NOTE — PROGRESS NOTES
[facility]  Infectious Disease  Progress Note    Patient Name: Lv Crocker  MRN: [id number]  Admission Date: 3/11/2018  Length of Stay: 17 days  Attending Physician: Heriberto Rosa MD  Primary Care Provider: Philippe Mohr MD    Isolation Status: No active isolations  Assessment/Plan:      Hypotension    - 41 y/o man with a ICM, s/p heart transplant 11/18/14; previously seen by ID on this admission for RSV-A pneumonia/multifocal pneumonia  - ID called back today as patient had fever and was hypotensive overnight requiring vasopressors. Previous infectious work up done during admission reviewed.  - Had bronchoscopy and BAL performed on 3/14/18 with cultures NGTD   - B/C 3/25/18 NGTD  - respiratory culture 3/25/18 NGTD  - CXR 3/26/18: continued right pleural effusion.  Diffuse increase in the pulmonary vascular interstitial and alveolar markings. Focal opacity developing at the left base.  - switched antibiotic therapy to meropenem and linezolid; continue posaconazole for now  - Chest ST with following findings: previously identified areas of consolidation noted in the left lung on prior chest CT on 03/13/2018 appear to have cleared with interval development of multiple new ground-glass opacities in the left upper and lower lobes as well as the right upper lobe, suggestive of edema versus pneumonitis. Large rounded opacity in the right lower lobe with associated air bronchogram that appears to have increased in size since prior examination on 07/19/2016 but appears similar to 03/13/2018 and 02/14/2018. Findings are suggestive of rounded atelectasis in the setting of chronic right-sided pleural effusion  - Improved since last evaluation, off vasopressors scheduled for tracheostomy and PEG today   - As per discussion with team will have bronchoscopy and thoracentesis of right chronic effusion today   - Recommend to continue meropenem and linezolid for 7-10 day course   - Can D/C  posaconazole   - cultures NGTD patient condition stable   - S/P trach placement unable to place PEG due to ascites  - Will sign off please call with questions     Recommendations   - continue linezolid and meropenem for 7 day course   - will sign off please call with additional questions             Anticipated Disposition:     Thank you for your consult. I will sign off. Please contact us if you have any additional questions.    Ariela Alejandra MD  Infectious Disease  [facility]    Subjective:     Principal Problem:Acute respiratory failure with hypoxia    HPI: 41yo man w/a history of DM1, Hashimoto's thyroiditis, and ICM (s/p OHT 11/18/2014, CMV D+/R+, steroid induction, on maintenance tacro/MMF/pred; c/b early hemorrhagic tamponade requiring washout, delayed closure, and pericardial window and subsequent AF w/RVR, and CACHORRO; late course c/b ABMR 4/2015 s/p rituxan, PLEX, and IVIG; subsequent C.diff/CMV reactivation, restrictive cardiomyopathy with recurrent pleural effusions s/p VATS/pleurex catheter 1/2018 with removal 2/2018 and ascites requiring repeated LVP, and CKD) who was admitted on 3/11/2018 with ~3wks of worsening profuse non-bloody diarrhea, associated cramping abdominal pain, N/V, and a week of acute on chronic cough, SOB, hypoxia requiring intubation, and fevers and was found to have multifocal pneumonia on CT chest due to RSV-A pneumonia with suspected superimposed bacterial pneumonia. He remains critically ill with ongoing pressor requirement, hypoxic RF, CRRT requirement, and minimal hepatitis.  Interval History: Patient s/p tracheostomy and PEG placement yesterday, condition unchanged     Review of Systems   Unable to perform ROS: Intubated     Objective:     Vital Signs (Most Recent):  Temp: 97 °F (36.1 °C) (03/29/18 1500)  Pulse: (!) 125 (03/29/18 1714)  Resp: 17 (03/29/18 1217)  BP: (!) 102/58 (03/29/18 1700)  SpO2: 100 % (03/29/18 1714) Vital Signs (24h Range):  Temp:   [96.2 °F (35.7 °C)-98 °F (36.7 °C)] 97 °F (36.1 °C)  Pulse:  [108-126] 125  Resp:  [14-31] 17  SpO2:  [99 %-100 %] 100 %  BP: ()/(51-79) 102/58  Arterial Line BP: ()/(45-70) 86/46     Weight: 73.2 kg (161 lb 6 oz)  Body mass index is 25.28 kg/m².    Estimated Creatinine Clearance: 97.9 mL/min (based on SCr of 0.9 mg/dL).    Physical Exam   Constitutional: He is sedated and intubated.   Eyes: No scleral icterus.   Cardiovascular: Normal rate and regular rhythm.    Pulmonary/Chest: He is intubated. He has no wheezes. He has no rales.   Abdominal: Soft. He exhibits no distension and no mass. There is no rebound.   Musculoskeletal: He exhibits no edema.   Lymphadenopathy:     He has no cervical adenopathy.   Neurological: He is alert.   Skin: No rash noted. No erythema.       Significant Labs:   Blood Culture:     Recent Labs  Lab 03/18/18  0618 03/18/18  0748 03/21/18  0815 03/25/18  1554 03/25/18  1606   LABBLOO Gram stain aer bottle: Gram positive cocci in clusters resembling Staph   Results called to and read back by:Omid Cody RN 03/19/2018  10:55  COAGULASE-NEGATIVE STAPHYLOCOCCUS SPECIESOrganism is a probable contaminant No growth after 5 days. No growth after 5 days. No Growth to date  No Growth to date  No Growth to date  No Growth to date No Growth to date  No Growth to date  No Growth to date  No Growth to date     CBC:     Recent Labs  Lab 03/28/18  1925 03/29/18  0751 03/29/18  1534   WBC 6.80 6.29 5.91   HGB 7.0* 6.8* 8.7*   HCT 22.9* 22.7* 27.1*    240 212     CMP:   Recent Labs  Lab 03/28/18  0356  03/28/18  2028 03/29/18  0254 03/29/18  1421     143  143  < > 141 139  139 138   K 4.8  4.8  4.8  < > 4.9 5.4*  5.4* 5.1     108  108  < > 106 106  106 105   CO2 24  24  24  < > 24 19*  19* 22*   *  118*  118*  < > 156* 167*  167* 167*   BUN 7  7  7  < > 29* 33*  33* 12   CREATININE 0.6  0.6  0.6  < > 1.7* 2.0*  2.0* 0.9   CALCIUM 8.3*   8.3*  8.3*  < > 8.7 8.8  8.8 8.3*   PROT 6.2  --   --  6.7  --    ALBUMIN 2.4*  2.4*  2.4*  < > 2.4* 2.6*  2.6* 2.7*   BILITOT 1.2*  --   --  1.1*  --    ALKPHOS 231*  --   --  216*  --    AST 42*  --   --  36  --    ALT 29  --   --  26  --    ANIONGAP 11  11  11  < > 11 14  14 11   EGFRNONAA >60.0  >60.0  >60.0  < > 48.0* 39.4*  39.4* >60.0   < > = values in this interval not displayed.  Microbiology Results (last 7 days)     Procedure Component Value Units Date/Time    AFB Culture & Smear [[id number]] Collected:  03/28/18 1607    Order Status:  Completed Specimen:  Body Fluid from Lung, RLL Updated:  03/29/18 1405     AFB CULTURE STAIN No acid fast bacilli seen.    Culture, Anaerobe [[id number]] Collected:  03/28/18 1607    Order Status:  Completed Specimen:  Body Fluid from Lung, RLL Updated:  03/29/18 1329     Anaerobic Culture Culture in progress    Aerobic culture [[id number]] Collected:  03/28/18 1607    Order Status:  Completed Specimen:  Body Fluid from Lung, RLL Updated:  03/29/18 0938     Aerobic Bacterial Culture No growth    Blood culture [[id number]] Collected:  03/25/18 1554    Order Status:  Completed Specimen:  Blood from Peripheral, Upper Arm, Right Updated:  03/28/18 1812     Blood Culture, Routine No Growth to date     Blood Culture, Routine No Growth to date     Blood Culture, Routine No Growth to date     Blood Culture, Routine No Growth to date    Blood culture [[id number]] Collected:  03/25/18 1606    Order Status:  Completed Specimen:  Blood from Peripheral, Forearm, Left Updated:  03/28/18 1812     Blood Culture, Routine No Growth to date     Blood Culture, Routine No Growth to date     Blood Culture, Routine No Growth to date     Blood Culture, Routine No Growth to date    Gram stain [[id number]] Collected:  03/28/18 1607    Order Status:  Completed Specimen:  Body Fluid from Lung, RLL Updated:  03/28/18 1654     Gram Stain Result Rare WBC's      No organisms seen    Fungus culture  [[id number]] Collected:  03/28/18 1607    Order Status:  Sent Specimen:  Body Fluid from Lung, RLL Updated:  03/28/18 1617    Fungus culture [[id number]] Collected:  03/14/18 1137    Order Status:  Completed Specimen:  Bronchial Wash from Amniotic Fluid Updated:  03/28/18 0933     Fungus (Mycology) Culture Culture in progress     Fungus (Mycology) Culture No fungus isolated after 2 weeks    Culture, Respiratory with Gram Stain [[id number]] Collected:  03/25/18 1534    Order Status:  Completed Specimen:  Respiratory from Sputum, Induced Updated:  03/27/18 0739     Respiratory Culture Normal respiratory hank     Gram Stain (Respiratory) <10 epithelial cells per low power field.     Gram Stain (Respiratory) Rare WBC's     Gram Stain (Respiratory) Rare Gram positive cocci    Blood culture [[id number]] Collected:  03/21/18 0815    Order Status:  Completed Specimen:  Blood from Peripheral, Forearm, Left Updated:  03/26/18 1012     Blood Culture, Routine No growth after 5 days.    Urine culture [[id number]]     Order Status:  Canceled Specimen:  Urine     Blood culture [[id number]] Collected:  03/18/18 0748    Order Status:  Completed Specimen:  Blood from Peripheral, Hand, Right Updated:  03/23/18 1012     Blood Culture, Routine No growth after 5 days.          Significant Imaging: I have reviewed all pertinent imaging results/findings within the past 24 hours.

## 2018-03-29 NOTE — ASSESSMENT & PLAN NOTE
- TTE 3/26/18 showed normal graft function  - Continue hydrocortisone 25mg BID (titrating down) with plan to start pred 10mg daily once enteral access is gained.  - Continue Tacro 1 mg bid (giving half dose sublingually until enteral access)   - Cellcept remains on hold

## 2018-03-29 NOTE — PLAN OF CARE
See vital signs and assessments in flowsheets. See below for updates on today's progress.     Pulmonary: vent to trach at 40% FiO2 PEEP of 5. , sats , coarse to diminished breath sounds    Cardiovascular: ST low 100-110s, no PVCs, no ectopies, SBP: 90s-110s MAP > 60, good equal pulses    Neurological: Withdrawn, Follows simple commands and nods to questions, Afebrile    Gastrointestinal: NPO, for NGT insertion today    Genitourinary: Anuric, ongoing CRRT at 450 UF    Endocrine: Hourly CBG with sliding insulin drip     Integumentary/Other: Intact    Infusions: Insulin / KVO / Abx    Plan of care communicated and reviewed with Lv Crocker and spouse. All concerns addressed. Will continue to monitor.

## 2018-03-29 NOTE — ASSESSMENT & PLAN NOTE
BG goal 140-180    Continue intensive insulin drip until after procedure/TF resumed. Will change to transition when insulin doses stabilize and TF restarted.    Change POC to q2.    If BG continues to drop while on insulin rate 0.1u/hr, will need to add D5 IVF to be able to continue continuous insulin gtt in pt with T1DM. Do not suspend gtt >1 hr, pt is T1DM.    Low c peptide with glc 164. Treat as type 1.   Neg MODE 2013, neg islet cell ab on this admission.   Cannot order insulin ab while on insulin, and ZnT8 unavailable in labs.

## 2018-03-29 NOTE — ASSESSMENT & PLAN NOTE
- Appreciate Endocrine's recs  - Holding levothyroxine for now pending enteral access   - Would need IV replacement if no enteral access by saturday

## 2018-03-29 NOTE — SUBJECTIVE & OBJECTIVE
Interval History: Clotted off on CRRT this AM (unable to give back blood). Hypotensive once SLED started requiring low dose levophed. No issues overnight with tracheostomy. Still very weak but following commands. 1 unit of PRBC given today     Continuous Infusions:   sodium chloride 0.9% 200 mL/hr at 03/29/18 1111    dexmedetomidine (PRECEDEX) infusion Stopped (03/21/18 0750)    insulin (HUMAN R) infusion (adults) 0.9 Units/hr (03/29/18 1600)    norepinephrine bitartrate-D5W Stopped (03/29/18 1634)     Scheduled Meds:   chlorhexidine  15 mL Mouth/Throat BID    [START ON 3/30/2018] famotidine (PF)  20 mg Intravenous Daily    heparin (porcine)  5,000 Units Subcutaneous Q12H    hydrocortisone sodium succinate  25 mg Intravenous BID    levothyroxine  125 mcg Per NG tube Before breakfast    linezolid 600mg/300ml  600 mg Intravenous Q12H    meropenem (MERREM) IVPB  2 g Intravenous Q12H    sodium chloride 0.9%  3 mL Intravenous Q8H    tacrolimus  0.5 mg Sublingual BID     PRN Meds:sodium chloride, dextrose 50%, dextrose 50%, dextrose 50%, dextrose 50%, fentaNYL, glucagon (human recombinant), glucose, glucose    Review of patient's allergies indicates:   Allergen Reactions    No known drug allergies      Objective:     Vital Signs (Most Recent):  Temp: 97 °F (36.1 °C) (03/29/18 1500)  Pulse: (!) 122 (03/29/18 1448)  Resp: 17 (03/29/18 1217)  BP: 110/62 (03/29/18 1600)  SpO2: 100 % (03/29/18 1448) Vital Signs (24h Range):  Temp:  [96.2 °F (35.7 °C)-98 °F (36.7 °C)] 97 °F (36.1 °C)  Pulse:  [108-126] 122  Resp:  [14-31] 17  SpO2:  [99 %-100 %] 100 %  BP: ()/(51-79) 110/62  Arterial Line BP: ()/(45-70) 86/46     Patient Vitals for the past 72 hrs (Last 3 readings):   Weight   03/28/18 0400 73.2 kg (161 lb 6 oz)   03/27/18 1400 74.7 kg (164 lb 10.9 oz)   03/27/18 0609 74.7 kg (164 lb 10.9 oz)     Body mass index is 25.28 kg/m².      Intake/Output Summary (Last 24 hours) at 03/29/18 1654  Last data  filed at 03/29/18 1631   Gross per 24 hour   Intake          6065.38 ml   Output             8636 ml   Net         -2570.62 ml     Telemetry: ST    Physical Exam   Constitutional: He appears well-developed and well-nourished.   Appears ill   HENT:   Head: Normocephalic and atraumatic.   Eyes: Conjunctivae are normal. Pupils are equal, round, and reactive to light.   Neck: No thyromegaly present.   RIJ trialysis   Tracheostomy    Cardiovascular: Regular rhythm.    Tachycardic   Pulmonary/Chest:   Breath sounds are slightly decreased right base. Coarse bilaterally   Abdominal: Soft. Bowel sounds are normal.   Musculoskeletal:   Trace - 1+ gen edema   Neurological:   Arouses to voice and nods head appropriately   Skin: Skin is warm and dry. Capillary refill takes less than 2 seconds. There is pallor.     Significant Labs:  CBC:    Recent Labs  Lab 03/28/18  1925 03/29/18  0751 03/29/18  1534   WBC 6.80 6.29 5.91   RBC 2.32* 2.30* 2.80*   HGB 7.0* 6.8* 8.7*   HCT 22.9* 22.7* 27.1*    240 212   MCV 99* 99* 97   MCH 30.2 29.6 31.1*   MCHC 30.6* 30.0* 32.1     BNP:  No results for input(s): BNP in the last 168 hours.    Invalid input(s): BNPTRIAGELBLO  CMP:    Recent Labs  Lab 03/27/18  0311  03/28/18  0356  03/28/18  2028 03/29/18  0254 03/29/18  1421   *  171*  171*  < > 118*  118*  118*  < > 156* 167*  167* 167*   CALCIUM 8.1*  8.1*  8.1*  < > 8.3*  8.3*  8.3*  < > 8.7 8.8  8.8 8.3*   ALBUMIN 2.2*  2.2*  2.2*  < > 2.4*  2.4*  2.4*  < > 2.4* 2.6*  2.6* 2.7*   PROT 6.0  --  6.2  --   --  6.7  --      142  142  < > 143  143  143  < > 141 139  139 138   K 4.7  4.7  4.7  < > 4.8  4.8  4.8  < > 4.9 5.4*  5.4* 5.1   CO2 24  24  24  < > 24  24  24  < > 24 19*  19* 22*     108  108  < > 108  108  108  < > 106 106  106 105   BUN 10  10  10  < > 7  7  7  < > 29* 33*  33* 12   CREATININE 0.8  0.8  0.8  < > 0.6  0.6  0.6  < > 1.7* 2.0*  2.0* 0.9   ALKPHOS 209*   --  231*  --   --  216*  --    ALT 24  --  29  --   --  26  --    AST 35  --  42*  --   --  36  --    BILITOT 1.1*  --  1.2*  --   --  1.1*  --    < > = values in this interval not displayed.   Coagulation:     Recent Labs  Lab 03/23/18  1550  03/26/18  1659 03/26/18 2017 03/27/18  0801   INR 1.4*  < > 1.0 1.0 1.0   APTT 25.5  --   --   --   --    < > = values in this interval not displayed.  LDH:  No results for input(s): LDH in the last 72 hours.  Microbiology:  Microbiology Results (last 7 days)     Procedure Component Value Units Date/Time    AFB Culture & Smear [[id number]] Collected:  03/28/18 1607    Order Status:  Completed Specimen:  Body Fluid from Lung, RLL Updated:  03/29/18 1405     AFB CULTURE STAIN No acid fast bacilli seen.    Culture, Anaerobe [[id number]] Collected:  03/28/18 1607    Order Status:  Completed Specimen:  Body Fluid from Lung, RLL Updated:  03/29/18 1329     Anaerobic Culture Culture in progress    Aerobic culture [[id number]] Collected:  03/28/18 1607    Order Status:  Completed Specimen:  Body Fluid from Lung, RLL Updated:  03/29/18 0938     Aerobic Bacterial Culture No growth    Blood culture [[id number]] Collected:  03/25/18 1554    Order Status:  Completed Specimen:  Blood from Peripheral, Upper Arm, Right Updated:  03/28/18 1812     Blood Culture, Routine No Growth to date     Blood Culture, Routine No Growth to date     Blood Culture, Routine No Growth to date     Blood Culture, Routine No Growth to date    Blood culture [[id number]] Collected:  03/25/18 1606    Order Status:  Completed Specimen:  Blood from Peripheral, Forearm, Left Updated:  03/28/18 1812     Blood Culture, Routine No Growth to date     Blood Culture, Routine No Growth to date     Blood Culture, Routine No Growth to date     Blood Culture, Routine No Growth to date    Gram stain [[id number]] Collected:  03/28/18 1607    Order Status:  Completed Specimen:  Body Fluid from Lung, RLL Updated:  03/28/18 1654     Gram  Stain Result Rare WBC's      No organisms seen    Fungus culture [[id number]] Collected:  03/28/18 1607    Order Status:  Sent Specimen:  Body Fluid from Lung, RLL Updated:  03/28/18 1617    Fungus culture [[id number]] Collected:  03/14/18 1137    Order Status:  Completed Specimen:  Bronchial Wash from Amniotic Fluid Updated:  03/28/18 0933     Fungus (Mycology) Culture Culture in progress     Fungus (Mycology) Culture No fungus isolated after 2 weeks    Culture, Respiratory with Gram Stain [[id number]] Collected:  03/25/18 1534    Order Status:  Completed Specimen:  Respiratory from Sputum, Induced Updated:  03/27/18 0739     Respiratory Culture Normal respiratory hank     Gram Stain (Respiratory) <10 epithelial cells per low power field.     Gram Stain (Respiratory) Rare WBC's     Gram Stain (Respiratory) Rare Gram positive cocci    Blood culture [[id number]] Collected:  03/21/18 0815    Order Status:  Completed Specimen:  Blood from Peripheral, Forearm, Left Updated:  03/26/18 1012     Blood Culture, Routine No growth after 5 days.    Urine culture [[id number]]     Order Status:  Canceled Specimen:  Urine     Blood culture [[id number]] Collected:  03/18/18 0748    Order Status:  Completed Specimen:  Blood from Peripheral, Hand, Right Updated:  03/23/18 1012     Blood Culture, Routine No growth after 5 days.        I have reviewed all pertinent labs within the past 24 hours.    Estimated Creatinine Clearance: 97.9 mL/min (based on SCr of 0.9 mg/dL).    Diagnostic Results:  I have reviewed all pertinent imaging results/findings within the past 24 hours.

## 2018-03-29 NOTE — PROGRESS NOTES
"[facility]  Endocrinology  Progress Note    Admit Date: 3/11/2018     Reason for Consult: abnormal TFTs    Surgical Procedure and Date: s/p heart transplant 2014     Diabetes diagnosis year: 7yo (T1DM)     Home Diabetes Medications:    Levemir 15u qHS  Novolog 4u AC     How often checking glucose at home? 4 times daily   BG readings on regimen: 150-200s  Hypoglycemia on the regimen?  Yes, rarely - treats appropriately (light snack, glucose tab)  Missed doses on regimen?  No        Diabetes Complications include:     Hyperglycemia, Hypoglycemia  and Diabetic chronic kidney disease          Complicating diabetes co morbidities:   N/A     HPI:   Patient is a 42 y.o. male with a diagnosis of T1DM, HTN, hypothyroidism, s/p heart transplant.  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 He required serial paracentesis and thoracentesis until he underwent a VATS with pleurX catheter placement on 1/11/2018 and removed 2/7/18.       Presented to hospital secondary to diarrhea and cough.  Upon initial labs, TSH was decreased (0.101) and free T4 1.33.  Endocrinology consulted to assist in management of these labs.  He was last seen in clinic by Kamala Vázquez NP 11/2017.   Previous hospitalization, endocrine consulted for same problem.  During that visit, recommended decreasing LT4 to 125mcg daily. Unfortunately, patient was discharged on previous dose of 150mcg daily.     Interval HPI:   Overnight events: Remains NPO. No NG in place for tube feeds; received trach yesterday. Possible PEG vs. NG replacement today. Remains on intensive insulin gtt with q1    BP (!) 101/53 (BP Location: Right arm, Patient Position: Lying)   Pulse (!) 117   Temp 96.8 °F (36 °C) (Axillary)   Resp 17   Ht 5' 7" (1.702 m)   Wt 73.2 kg (161 lb 6 oz)   SpO2 100%   BMI 25.28 kg/m²       Labs Reviewed and Include      Recent " Labs  Lab 03/29/18  0254   *  167*   CALCIUM 8.8  8.8   ALBUMIN 2.6*  2.6*   PROT 6.7     139   K 5.4*  5.4*   CO2 19*  19*     106   BUN 33*  33*   CREATININE 2.0*  2.0*   ALKPHOS 216*   ALT 26   AST 36   BILITOT 1.1*     Lab Results   Component Value Date    WBC 6.29 03/29/2018    HGB 6.8 (L) 03/29/2018    HCT 22.7 (L) 03/29/2018    MCV 99 (H) 03/29/2018     03/29/2018     No results for input(s): TSH, FREET4 in the last 168 hours.  Lab Results   Component Value Date    HGBA1C 6.9 (H) 02/11/2018       Nutritional status:   Body mass index is 25.28 kg/m².  Lab Results   Component Value Date    ALBUMIN 2.6 (L) 03/29/2018    ALBUMIN 2.6 (L) 03/29/2018    ALBUMIN 2.4 (L) 03/28/2018     Lab Results   Component Value Date    PREALBUMIN 5 (L) 03/15/2018    PREALBUMIN 18 (L) 03/24/2015    PREALBUMIN 19 (L) 12/17/2014       Estimated Creatinine Clearance: 44.1 mL/min (A) (based on SCr of 2 mg/dL (H)).    Accu-Checks  Recent Labs      03/29/18   0401  03/29/18   0509  03/29/18   0622  03/29/18   0624  03/29/18   0748  03/29/18   0825  03/29/18   0903  03/29/18   1004  03/29/18   1108  03/29/18   1204   POCTGLUCOSE  181*  185*  247*  232*  228*  182*  175*  116*  118*  130*       Current Medications and/or Treatments Impacting Glycemic Control  Immunotherapy:  Immunosuppressants         Stop Route Frequency     tacrolimus (PROGRAF) 1 mg/mL oral syringe      -- PER NG TUBE 2 times daily        Steroids:   Hormones     Start     Stop Route Frequency Ordered    03/30/18 0900  predniSONE tablet 10 mg      -- Oral Daily 03/29/18 1006    03/28/18 2100  hydrocortisone sodium succinate injection 50 mg      03/30 0859 IV 2 times daily 03/28/18 1249        Pressors:    Autonomic Drugs     Start     Stop Route Frequency Ordered    03/29/18 1045  norepinephrine 4 mg in dextrose 5% 250 mL infusion (premix) (titrating)     Question Answer Comment   Titrate by: (in mcg/kg/min) 0.02    Titrate interval:  (in minutes) 15    Titrate to maintain: (MAP or SBP) MAP    Greater than: (in mmHg) 65    Maximum dose: (in mcg/kg/min) 2        -- IV Continuous 03/29/18 0922        Hyperglycemia/Diabetes Medications: Antihyperglycemics     Start     Stop Route Frequency Ordered    03/28/18 0245  insulin regular (Humulin R) 100 Units in sodium chloride 0.9% 100 mL infusion     Question:  Insulin Rate Adjustment (DO NOT MODIFY ANSWER)  Answer:  \\[email or web address] [id number]    -- IV Continuous 03/28/18 0144          ASSESSMENT and PLAN    * Acute respiratory failure with hypoxia    Per primary        On enteral nutrition    May adversely impact bg          Type 1 diabetes mellitus with hyperglycemia    BG goal 140-180    Continue intensive insulin drip until after procedure/TF resumed. Will change to transition when insulin doses stabilize and TF restarted.    Change POC to q2.    If BG continues to drop while on insulin rate 0.1u/hr, will need to add D5 IVF to be able to continue continuous insulin gtt in pt with T1DM. Do not suspend gtt >1 hr, pt is T1DM.    Low c peptide with glc 164. Treat as type 1.   Neg MODE 2013, neg islet cell ab on this admission.   Cannot order insulin ab while on insulin, and ZnT8 unavailable in labs.          Hypothyroidism due to Hashimoto's thyroiditis    Abnormal TFTs upon admit.  Unclear if secondary to illness, but with evidence of iatrogenic hyperthyroidism in past while on above weight based dose.     Continue LT4 ng 125mcg daily (decreased from 150mcg)  Repeat TFTs in AM.          Acute renal failure superimposed on stage 4 chronic kidney disease    Avoid insulin stacking              Corey Johnson MD  Endocrinology  [facility]

## 2018-03-29 NOTE — PROGRESS NOTES
[facility]  Thoracic Surgery  Progress Note    Subjective:     History of Present Illness:  42 year old male with PMH of 42 y.o. male presents with PMH of OHTX in November 2014, Hashimoto's thyroiditis, T1DM, CKD and history of recurrent acsites and pleural effusions now admitted for respiratory failure from severe RSV. He is known to thoracic surgery as he underwent a right VATS drainage of effusion and Pleurx placement on 1/11/18 by Dr. James. Pleurx was removed in clinic on 2/7/18 due to minimal output and potential clogging. In February he was admitted for diarrhea, worsening nonproductive cough and near syncopal episodes. Most recently admitted on 3/13/18 after he was found to be febrile and hypoxic in endoscopy lab.  Intubated on 3/14/18 for hypoxemic and hypercapnic resp failure. Chest CT at that time showed small right pleural effusion and bilateral subsegmental multifocal consolidation with air bronchograms more extensive in left lung. Since then he has had a complicated hospital stay for presumed severe RSV including acute kidney failure on CRRT, intermittent pressor requirement, DIC and several failed SBTs.    Currently on FiO2 of 40% and 5 PEEP. Tachycardic but not on pressors. Has been on Vanc, Zosyn and  Posoconazole . Switched to Linezolid and Meropenum today.   On Tacrolimus and Hydrocortisone for immunosuppression, Cellcept held.        PSH significant for OHTx in 2014 and cholecystectomy. Prior to right pleurx placement, he was getting monthly paracenteses and thoracentesis.     Post-Op Info:  Procedure(s) (LRB):  TRACHEOSTOMY (N/A)  BRONCHOSCOPY-OPERATIVE,FLEXIBLE (N/A)  LAVAGE-ALVEOLAR (N/A)   1 Day Post-Op     Interval History:   Trach done yesterday in the OR, no complications  Doing well this AM, on minimal vent settings    Medications:  Continuous Infusions:   sodium chloride 0.9% 200 mL/hr at 03/28/18 7614    dexmedetomidine (PRECEDEX) infusion Stopped (03/21/18  0750)    insulin (HUMAN R) infusion (adults) 2.3 Units/hr (03/29/18 0600)    norepinephrine bitartrate-D5W Stopped (03/26/18 1320)     Scheduled Meds:   chlorhexidine  15 mL Mouth/Throat BID    docusate  100 mg Per NG tube BID    escitalopram oxalate  20 mg Oral Daily    famotidine (PF)  20 mg Intravenous BID    heparin (porcine)  5,000 Units Subcutaneous Q12H    hydrocortisone sodium succinate  50 mg Intravenous BID    levothyroxine  125 mcg Per NG tube Before breakfast    linezolid 600mg/300ml  600 mg Intravenous Q12H    meropenem (MERREM) IVPB  2 g Intravenous Q12H    metoclopramide HCl  5 mg Intravenous Q6H    polyethylene glycol  17 g Per NG tube Daily    sodium chloride 0.9%  3 mL Intravenous Q8H    tacrolimus  1 mg Per NG tube BID     PRN Meds:acetaminophen, dextrose 50%, dextrose 50%, dextrose 50%, dextrose 50%, glucagon (human recombinant), glucose, glucose, insulin aspart U-100, k phos di & mono-sod phos mono     Review of patient's allergies indicates:   Allergen Reactions    No known drug allergies      Objective:     Vital Signs (Most Recent):  Temp: 97.5 °F (36.4 °C) (03/29/18 0300)  Pulse: (!) 111 (03/29/18 0600)  Resp: (!) 23 (03/29/18 0600)  BP: (!) 105/52 (03/29/18 0600)  SpO2: 100 % (03/29/18 0600) Vital Signs (24h Range):  Temp:  [97.5 °F (36.4 °C)-98.6 °F (37 °C)] 97.5 °F (36.4 °C)  Pulse:  [108-133] 111  Resp:  [0-31] 23  SpO2:  [97 %-100 %] 100 %  BP: ()/(51-79) 105/52  Arterial Line BP: ()/(45-70) 97/46     Intake/Output - Last 3 Shifts       03/27 0700 - 03/28 0659 03/28 0700 - 03/29 0659 03/29 0700 - 03/30 0659    I.V. (mL/kg) 3183.3 (43.5) 2761.7 (37.7)     NG/GT 1230 240     IV Piggyback 700 800     Total Intake(mL/kg) 5113.3 (69.9) 3801.7 (51.9)     Other 6247 5295     Stool       Total Output 6216 5295      Net -1133.7 -1493.3             Stool Occurrence  1 x           SpO2: 100 %  O2 Device (Oxygen Therapy): ventilator    Physical Exam   HENT:   Head:  Normocephalic and atraumatic.   Neck: No thyromegaly present.   Trach in place   Cardiovascular: Regular rhythm and intact distal pulses.  Tachycardia present.    No murmur heard.  Pulmonary/Chest: He has decreased breath sounds in the right lower field and the left lower field.   Abdominal: Soft. Bowel sounds are normal. He exhibits no distension. There is no tenderness.   Musculoskeletal: He exhibits no edema.   Neurological: He is alert.   Vitals reviewed.      Significant Labs:  All pertinent labs from the last 24 hours have been reviewed.    Significant Diagnostics:  I have reviewed and interpreted all pertinent imaging results/findings within the past 24 hours.    VTE Risk Mitigation         Ordered     heparin (porcine) injection 5,000 Units  Every 12 hours     Route:  Subcutaneous        03/23/18 1611        Assessment/Plan:     * Acute respiratory failure with hypoxia    42 y.o. male presents with PMH of OHTX in November 2014, Hashimoto's thyroiditis, T1DM, CKD and history of recurrent acsites and pleural effusions now admitted for respiratory failure from severe RSV s/p trach placement on 3/28    - Trach yesterday, tolerated well  - BAL sent sent yesterday  - If right-sided pleural effusion becomes an issue in the future, may be amenable to pigtail catheter drainage with IR  - we will sign off at this point, please contact us with any questions or concerns            Blu Justin MD  Thoracic Surgery  [facility]

## 2018-03-29 NOTE — PROGRESS NOTES
[facility]  Nephrology  Progress Note    Patient Name: Lv Crocker  MRN: [id number]  Admission Date: 3/11/2018  Hospital Length of Stay: 17 days  Attending Provider: Heriberto Rosa MD   Primary Care Physician: Philippe Mohr MD  Principal Problem:Acute respiratory failure with hypoxia    Subjective:     HPI: Mr. Crocker is a 43 yo WM with T1DM, Hashimoto thyroiditis, h/o OHTx 11/2014, and CKD stage 4 who was admitted on 3/11/18 for persistent diarrhea. He reported a 3 week history of diarrhea up to 15x/day. Associated symptoms including URI symptoms, coughing fits, and coughing syncope. Prograf level was 14.3. His post-heart transplant course has been complicated by AMR 4/2015, CMV, post-transplant restrictive cardiomyopathy, recurrent pleural effusions/ascites requiring monthly thoracenteses/paracenteses, VATS 1/11/18 with Pleur-X catheter (now removed), and CKD stage 4 with baseline sCr 2.6-3.0. Baseline -120s and baseline BP 90s-110s/60-70s. Upon admission he was started on IVF and diarrhea workup was initiated. On 3/12 he was noted to have decreased UOP despite fluids; NS was increased to 100cc/hr. He was scheduled for a colonoscopy on 3/13 however procedure was cancelled due to hypoxia and fever. He was transferred to the ICU for closer monitoring. He continued to have oliguria overnight. Bladder scan revealed 200cc of urine but patient refused osullivan catheter placement. Wife reports that patient always has a hard time urinating again after osullivan catheters are placed/removed so he refuses them. He remained hypoxic despite FiO2 70% and ABG revealed combined respiratory and metabolic acidosis with pH 7.17. He was started on BiPAP as well as a bicarb infusion at 50cc/hr. ABG with mild improvement. AM labs revealed K 6.2 and he was shifted and given kayexalate.Trialysis catheter was placed this morning and he subsequently developed hypotension and was started on levophed. He  was intubated later this morning. Nephrology consulted for CACHORRO. All history obtained by primary team and chart review as patient was intubated/sedated on exam. Consent for dialysis obtained by patient's wife and placed in chart. Of note, both of patient's parents were on dialysis.     Interval History: RRT held most of day yesterday. Underwent trach placement around 5pm. SCUF started afterwards with UF 450cc/hr. No events overnight. Switched to SLED this morning; mild drop in BP and UF decreased to 350cc/hr.   Net negative 2L yesterday. Patient working with PT/OT this morning. Sitting on side of bed. Appears well.     Review of patient's allergies indicates:   Allergen Reactions    No known drug allergies      Current Facility-Administered Medications   Medication Frequency    norepinephrine bitartrate-D5W 4 mg/250 mL (16 mcg/mL) infusion Soln     0.9%  NaCl infusion (CRRT USE ONLY) Continuous    acetaminophen oral solution 650 mg Q4H PRN    chlorhexidine 0.12 % solution 15 mL BID    dexmedetomidine (PRECEDEX) 400mcg/100mL 0.9% NaCL infusion Continuous    dextrose 50% injection 12.5 g PRN    dextrose 50% injection 12.5 g PRN    dextrose 50% injection 25 g PRN    dextrose 50% injection 25 g PRN    docusate 50 mg/5 mL liquid 100 mg BID    famotidine (PF) injection 20 mg BID    fentaNYL injection 50 mcg Q1H PRN    glucagon (human recombinant) injection 1 mg PRN    glucose chewable tablet 16 g PRN    glucose chewable tablet 24 g PRN    heparin (porcine) injection 5,000 Units Q12H    hydrocortisone sodium succinate injection 50 mg BID    insulin regular (Humulin R) 100 Units in sodium chloride 0.9% 100 mL infusion Continuous    k phos di & mono-sod phos mono 250 mg tablet 2 tablet Q4H PRN    levothyroxine tablet 125 mcg Before breakfast    linezolid 600mg/300ml IVPB 600 mg Q12H    meropenem (MERREM) 2 g in sodium chloride 0.9% 100 mL IVPB Q12H    metoclopramide HCl injection 5 mg Q6H     norepinephrine 4 mg in dextrose 5% 250 mL infusion (premix) (titrating) Continuous    polyethylene glycol packet 17 g Daily    sodium chloride 0.9% flush 3 mL Q8H    tacrolimus (PROGRAF) 1 mg/mL oral syringe BID       Objective:     Vital Signs (Most Recent):  Temp: 96.2 °F (35.7 °C) (03/29/18 0700)  Pulse: (!) 116 (03/29/18 0908)  Resp: (!) 23 (03/29/18 0600)  BP: (!) 105/52 (03/29/18 0600)  SpO2: 100 % (03/29/18 0908)  O2 Device (Oxygen Therapy): ventilator (03/29/18 0908) Vital Signs (24h Range):  Temp:  [96.2 °F (35.7 °C)-98.6 °F (37 °C)] 96.2 °F (35.7 °C)  Pulse:  [108-133] 116  Resp:  [0-31] 23  SpO2:  [97 %-100 %] 100 %  BP: ()/(51-79) 105/52  Arterial Line BP: ()/(45-70) 97/46     Weight: 73.2 kg (161 lb 6 oz) (03/28/18 0400)  Body mass index is 25.28 kg/m².  Body surface area is 1.86 meters squared.    I/O last 3 completed shifts:  In: 5987.9 [I.V.:4397.9; NG/GT:490; IV Piggyback:1100]  Out: 9059 [Other:9059]    Physical Exam   Constitutional: He appears well-developed and well-nourished. No distress.   HENT:   Head: Normocephalic and atraumatic.   Eyes: Conjunctivae and EOM are normal.   Cardiovascular: Regular rhythm.  Tachycardia present.    Pulmonary/Chest: Effort normal. No respiratory distress.   Abdominal: Soft. He exhibits no distension.   Musculoskeletal: He exhibits edema. He exhibits no deformity.   Skin: Skin is warm and dry. He is not diaphoretic.       Significant Labs:  CBC:   Recent Labs  Lab 03/29/18  0751   WBC 6.29   RBC 2.30*   HGB 6.8*   HCT 22.7*      MCV 99*   MCH 29.6   MCHC 30.0*     CMP:   Recent Labs  Lab 03/29/18  0254   *  167*   CALCIUM 8.8  8.8   ALBUMIN 2.6*  2.6*   PROT 6.7     139   K 5.4*  5.4*   CO2 19*  19*     106   BUN 33*  33*   CREATININE 2.0*  2.0*   ALKPHOS 216*   ALT 26   AST 36   BILITOT 1.1*     All labs within the past 24 hours have been reviewed.     Significant Imaging:  Labs: Reviewed  X-Ray:  Reviewed    Assessment/Plan:     Acute renal failure superimposed on stage 4 chronic kidney disease    - baseline sCr 2.6-3.0 consistent with CKD stage IV  - anuric CACHORRO; likely ischemic ATN from prolonged pre-renal state (diarrhea) in setting of prograf renal vasoconstriction; cannot r/o septic ATN or Prograf toxicity  - SLED started 3/14  - remains anuric  - net negative 1.7L yesterday with SCUF. CXR improved. Mild hyperkalemia and metabolic acidosis this AM  - change to SLED today for metabolic clearance. Decrease UF goal to match I/O. Will hold overnight and reassess in AM. Anticipating 12-hour treatments in the future.             Amairani Alegria, PGY-5  Nephrology Fellow  [facility]  Pager: [phone]

## 2018-03-29 NOTE — ASSESSMENT & PLAN NOTE
Abnormal TFTs upon admit.  Unclear if secondary to illness, but with evidence of iatrogenic hyperthyroidism in past while on above weight based dose.     Continue LT4 ng 125mcg daily (decreased from 150mcg)  Repeat TFTs in AM.

## 2018-03-29 NOTE — PLAN OF CARE
Problem: Physical Therapy Goal  Goal: Physical Therapy Goal  Goals to be met by: 18     Patient will increase functional independence with mobility by performin. Supine to sit with Moderate Assistance - not met  2. Sit to supine with Moderate Assistance - not met  3. Rolling to Left and Right with Minimal Assistance. - not met  4. Sit to stand transfer with Moderate Assistance - not met  5. Bed to chair transfer with Maximum Assistance - not met  6. Gait  x 20 feet with Minimal Assistance. - not met      Goals remain appropriate. Josette Nowak, PT 3/29/2018

## 2018-03-29 NOTE — PROGRESS NOTES
[facility]. Called and spoke with Brandy. MD leaves at 1700 today. Pt is still on CRRT at this time and may not be able to get him ready and transported down in time. Called Dr Paris to ask if it would be okay to go in the morning. MD is okay with that. Brandy said there will be a MD on call tomorrow and they will call me in the morning to set up a time to transport pt for NGT placement.

## 2018-03-29 NOTE — OP NOTE
Date of Procedure: 3/28/2018    Pre-operative Diagnosis: Ventilator-Dependent Respiratory Failure    Post-operative Diagnosis: Same    Procedure(s): 1. Flexible Bronchoscopy with Bronchoalveolar Lavage.  2. Tracheostomy    Surgeon: Carlitos Cordero MD    Assistant(s): Shreya Lopez MD; Blu Justin MD    Anesthesia: GETA    Findings: Normal airway    Estimated Blood Loss: Minimal    Specimen(s): RLL BAL    Complications: None    Indications for Procedure: 41 yo male with history of heart transplant who has developed acute respiratory failure and has been unable to wean from mechanical ventilation.  It was decided to proceed with tracheostomy.  Risks, benefits and possible outcomes were discussed in detail with the patient's wife, and she was given the opportunity to ask questions and have those questions answered to her satisfaction.  She desires to proceed and signed consent.    Procedure in Detail: The patient was transported to the operating room from the ICU in stable condition and placed supine on the operating table.  Adequate general anesthesia was achieved.  A time-out was performed. Flexible bronchoscope was passed through the endotracheal tube and the entire airway examined thoroughly.  There were no endobronchial lesions or abnormal secretions.  Bronchoalveolar lavage was performed in the right lower lobe by instilling sterile saline and suctioning the effluent into a Lukens trap.  Scope was removed.  Transverse should roll was placed and his neck was maximally extended without the head floating.  Arms were tucked at his side and padded appropriately.  The neck and upper chest were prepped and draped in standard sterile fashion.  Prophylactic intravenous antibiotics were administered.  A vertical cervical incision was made one fingerbreadth cephalad to the sternal notch.  Subcutaneous tissue was divided using electrocautery.  An appropriate spot for tracheostomy was identified at the second  cartilaginous ring.  The trachea was then cannulated with a hollow bore needle under bronchoscopic visualization.  The guidewire was then passed. Tracheotomy was then created by serial dilation; first with the punch dilator and then the Blue Rhino dilator. A #8 Shiley tracheostomy was loaded onto the appropriate size loading dilator and passed into the trachea by Seldinger technique. Bronchoscopy through the tracheostomy confirmed appropriate position with visualization of the shivani. The cuff of the tracheostomy tube was inflated and connected to the ventilator.  Hemostasis was excellent.  The endotracheal tube was removed and the flange of the tracheostomy tube was secured in place using Velcro tracheostomy tape and two 2-0 Prolene sutures. All sponge, needle and instrument counts were correct at the end of the case.  He tolerated the procedures well.  There were no immediate complications.    Disposition: ICU in stable condition

## 2018-03-29 NOTE — SUBJECTIVE & OBJECTIVE
Interval History: Patient s/p tracheostomy and PEG placement yesterday, condition unchanged     Review of Systems   Unable to perform ROS: Intubated     Objective:     Vital Signs (Most Recent):  Temp: 97 °F (36.1 °C) (03/29/18 1500)  Pulse: (!) 125 (03/29/18 1714)  Resp: 17 (03/29/18 1217)  BP: (!) 102/58 (03/29/18 1700)  SpO2: 100 % (03/29/18 1714) Vital Signs (24h Range):  Temp:  [96.2 °F (35.7 °C)-98 °F (36.7 °C)] 97 °F (36.1 °C)  Pulse:  [108-126] 125  Resp:  [14-31] 17  SpO2:  [99 %-100 %] 100 %  BP: ()/(51-79) 102/58  Arterial Line BP: ()/(45-70) 86/46     Weight: 73.2 kg (161 lb 6 oz)  Body mass index is 25.28 kg/m².    Estimated Creatinine Clearance: 97.9 mL/min (based on SCr of 0.9 mg/dL).    Physical Exam   Constitutional: He is sedated and intubated.   Eyes: No scleral icterus.   Cardiovascular: Normal rate and regular rhythm.    Pulmonary/Chest: He is intubated. He has no wheezes. He has no rales.   Abdominal: Soft. He exhibits no distension and no mass. There is no rebound.   Musculoskeletal: He exhibits no edema.   Lymphadenopathy:     He has no cervical adenopathy.   Neurological: He is alert.   Skin: No rash noted. No erythema.       Significant Labs:   Blood Culture:     Recent Labs  Lab 03/18/18  0618 03/18/18  0748 03/21/18  0815 03/25/18  1554 03/25/18  1606   LABBLOO Gram stain aer bottle: Gram positive cocci in clusters resembling Staph   Results called to and read back by:Omid Cody RN 03/19/2018  10:55  COAGULASE-NEGATIVE STAPHYLOCOCCUS SPECIESOrganism is a probable contaminant No growth after 5 days. No growth after 5 days. No Growth to date  No Growth to date  No Growth to date  No Growth to date No Growth to date  No Growth to date  No Growth to date  No Growth to date     CBC:     Recent Labs  Lab 03/28/18  1925 03/29/18  0751 03/29/18  1534   WBC 6.80 6.29 5.91   HGB 7.0* 6.8* 8.7*   HCT 22.9* 22.7* 27.1*    240 212     CMP:   Recent Labs  Lab 03/28/18  4076   03/28/18 2028 03/29/18  0254 03/29/18  1421     143  143  < > 141 139  139 138   K 4.8  4.8  4.8  < > 4.9 5.4*  5.4* 5.1     108  108  < > 106 106  106 105   CO2 24  24  24  < > 24 19*  19* 22*   *  118*  118*  < > 156* 167*  167* 167*   BUN 7  7  7  < > 29* 33*  33* 12   CREATININE 0.6  0.6  0.6  < > 1.7* 2.0*  2.0* 0.9   CALCIUM 8.3*  8.3*  8.3*  < > 8.7 8.8  8.8 8.3*   PROT 6.2  --   --  6.7  --    ALBUMIN 2.4*  2.4*  2.4*  < > 2.4* 2.6*  2.6* 2.7*   BILITOT 1.2*  --   --  1.1*  --    ALKPHOS 231*  --   --  216*  --    AST 42*  --   --  36  --    ALT 29  --   --  26  --    ANIONGAP 11  11  11  < > 11 14  14 11   EGFRNONAA >60.0  >60.0  >60.0  < > 48.0* 39.4*  39.4* >60.0   < > = values in this interval not displayed.  Microbiology Results (last 7 days)     Procedure Component Value Units Date/Time    AFB Culture & Smear [[id number]] Collected:  03/28/18 1607    Order Status:  Completed Specimen:  Body Fluid from Lung, RLL Updated:  03/29/18 1405     AFB CULTURE STAIN No acid fast bacilli seen.    Culture, Anaerobe [[id number]] Collected:  03/28/18 1607    Order Status:  Completed Specimen:  Body Fluid from Lung, RLL Updated:  03/29/18 1329     Anaerobic Culture Culture in progress    Aerobic culture [[id number]] Collected:  03/28/18 1607    Order Status:  Completed Specimen:  Body Fluid from Lung, RLL Updated:  03/29/18 0938     Aerobic Bacterial Culture No growth    Blood culture [[id number]] Collected:  03/25/18 1554    Order Status:  Completed Specimen:  Blood from Peripheral, Upper Arm, Right Updated:  03/28/18 1812     Blood Culture, Routine No Growth to date     Blood Culture, Routine No Growth to date     Blood Culture, Routine No Growth to date     Blood Culture, Routine No Growth to date    Blood culture [[id number]] Collected:  03/25/18 1606    Order Status:  Completed Specimen:  Blood from Peripheral, Forearm, Left Updated:  03/28/18 1812     Blood  Culture, Routine No Growth to date     Blood Culture, Routine No Growth to date     Blood Culture, Routine No Growth to date     Blood Culture, Routine No Growth to date    Gram stain [[id number]] Collected:  03/28/18 1607    Order Status:  Completed Specimen:  Body Fluid from Lung, RLL Updated:  03/28/18 1654     Gram Stain Result Rare WBC's      No organisms seen    Fungus culture [[id number]] Collected:  03/28/18 1607    Order Status:  Sent Specimen:  Body Fluid from Lung, RLL Updated:  03/28/18 1617    Fungus culture [[id number]] Collected:  03/14/18 1137    Order Status:  Completed Specimen:  Bronchial Wash from Amniotic Fluid Updated:  03/28/18 0933     Fungus (Mycology) Culture Culture in progress     Fungus (Mycology) Culture No fungus isolated after 2 weeks    Culture, Respiratory with Gram Stain [[id number]] Collected:  03/25/18 1534    Order Status:  Completed Specimen:  Respiratory from Sputum, Induced Updated:  03/27/18 0739     Respiratory Culture Normal respiratory hank     Gram Stain (Respiratory) <10 epithelial cells per low power field.     Gram Stain (Respiratory) Rare WBC's     Gram Stain (Respiratory) Rare Gram positive cocci    Blood culture [[id number]] Collected:  03/21/18 0815    Order Status:  Completed Specimen:  Blood from Peripheral, Forearm, Left Updated:  03/26/18 1012     Blood Culture, Routine No growth after 5 days.    Urine culture [[id number]]     Order Status:  Canceled Specimen:  Urine     Blood culture [[id number]] Collected:  03/18/18 0748    Order Status:  Completed Specimen:  Blood from Peripheral, Hand, Right Updated:  03/23/18 1012     Blood Culture, Routine No growth after 5 days.          Significant Imaging: I have reviewed all pertinent imaging results/findings within the past 24 hours.

## 2018-03-29 NOTE — PLAN OF CARE
Problem: Occupational Therapy Goal  Goal: Occupational Therapy Goal  Goals to be met by: 4/7/2018    Pt will follow 75% of motor commands with <2 verbal cues for repetition of task to maximize engagement in grooming task.  Pt will complete feeding with mod A.   Pt will complete supine with HOB slightly elevated to seated at EOB with mod A in prep for seated grooming task.  Pt will engage in BUE exercises in orders to increase strength to 3+/5 in BUE's to increase engagement in seated grooming task  Pt will sit at EOB for approx 10 minutes with mod A and unilateral UE support to complete grooming task  Pt will complete sit>stand from EOB with bed in lowest position with mod A in prep for transfer to [facility]   Goals remain appropriate

## 2018-03-29 NOTE — ASSESSMENT & PLAN NOTE
-S/P Bronch and intubation 3/14. Cultures ngtd.   - Awaiting culture results from BAL s/p repeat bronch 3/28/18   - Tracheostomy appears C/D/I. Pulm to help with vent wean / management.   - Thoracic surgery following. Appreciate recs   - RSV positive. Completed course of Ribavarin/IVIG. Per lung transplant protocol for severe RSV(given myelosuppression).   - ID following. Appreciate recs.   - IR to place cor edgar in the AM

## 2018-03-29 NOTE — PROGRESS NOTES
Charge RN at bedside to place Coretrak on pt. After multiple attempts we were unable to place as pt has large gag reflex and could not relax, despite being given 50 mcg Fentanyl IVP. Much resistance was met and we could not advance coretrak. I called Dr Paris to notify. MD will speak with Dr Rosa and get back in touch.

## 2018-03-29 NOTE — PROGRESS NOTES
Pt's spouse had expressed to me earlier that she would really like to bring in their Service Dog to visit with the pt. Dr Rosa okay with dog coming to see pt when discussed with pt's spouse at bedside this morning. I spoke with my Charge RN and was told to contact [employer] to find out protocol regarding pet visitation. I spoke with Marilu Yeh with [employer] and she sent over all required paperwork needed for dog to visit on [facility]. All paperwork has been filled out by vete[employer]  already and spouse was able to give me a copy of vaccinations that [facility]  had given her when they had done this days ago. All paperwork has been filled out, sent back to Marilu, and copies placed in pt chart. Nursing communication placed for aaproval for dog to visit. Dr Paris aware and okay with.

## 2018-03-29 NOTE — ASSESSMENT & PLAN NOTE
42 y.o. male presents with PMH of OHTX in November 2014, Hashimoto's thyroiditis, T1DM, CKD and history of recurrent acsites and pleural effusions now admitted for respiratory failure from severe RSV s/p trach placement on 3/28    - Trach yesterday, tolerated well  - BAL sent sent yesterday  - If right-sided pleural effusion becomes an issue in the future, may be amenable to pigtail catheter drainage with IR

## 2018-03-30 PROBLEM — I95.9 HYPOTENSION: Status: RESOLVED | Noted: 2018-01-01 | Resolved: 2018-01-01

## 2018-03-30 NOTE — PLAN OF CARE
Problem: Patient Care Overview  Goal: Plan of Care Review  Outcome: Ongoing (interventions implemented as appropriate)     No major changes this shift. See flowsheets and notes for further details.     Resp: vent to trach at 35% FiO2 PEEP of 5. , sats , coarse to diminished breath sounds. Trache collar trial performed for 1 hour today. Pt tolerated well.      CV: ST low 90-110s, no PVCs, no ectopies, SBP: 90s-110s MAP > 60 from a-line. No Levo started today. CVP= 7.     Neuro: Withdrawn, Follows simple commands and nods to questions, Afebrile. PERRLA. Opens eyes spontaneously.     Gastrointestinal: NPO.      Genitourinary: Anuric, ongoing CRRT at 300 UF.     Lines: Righ trialysis catheter, Right fem a-line, Left UA midline, and Right arm PIV. A-line dressing changed today.     Infusions: Insulin @ 0.5 on transitional drip, KVO @ 5, and Antibx. No Levo started today.      Plan of care communicated and reviewed with Patient. All concerns addressed. Will continue to monitor.   Pt received bath and linen change this shift.

## 2018-03-30 NOTE — PROGRESS NOTES
[facility]  Nephrology  Progress Note    Patient Name: Lv Crocker  MRN: [id number]  Admission Date: 3/11/2018  Hospital Length of Stay: 18 days  Attending Provider: Heriberto Rosa MD   Primary Care Physician: Philippe Mohr MD  Principal Problem:Acute respiratory failure with hypoxia    Subjective:     HPI: Mr. Crocker is a 43 yo WM with T1DM, Hashimoto thyroiditis, h/o OHTx 11/2014, and CKD stage 4 who was admitted on 3/11/18 for persistent diarrhea. He reported a 3 week history of diarrhea up to 15x/day. Associated symptoms including URI symptoms, coughing fits, and coughing syncope. Prograf level was 14.3. His post-heart transplant course has been complicated by AMR 4/2015, CMV, post-transplant restrictive cardiomyopathy, recurrent pleural effusions/ascites requiring monthly thoracenteses/paracenteses, VATS 1/11/18 with Pleur-X catheter (now removed), and CKD stage 4 with baseline sCr 2.6-3.0. Baseline -120s and baseline BP 90s-110s/60-70s. Upon admission he was started on IVF and diarrhea workup was initiated. On 3/12 he was noted to have decreased UOP despite fluids; NS was increased to 100cc/hr. He was scheduled for a colonoscopy on 3/13 however procedure was cancelled due to hypoxia and fever. He was transferred to the ICU for closer monitoring. He continued to have oliguria overnight. Bladder scan revealed 200cc of urine but patient refused osullivan catheter placement. Wife reports that patient always has a hard time urinating again after osullivan catheters are placed/removed so he refuses them. He remained hypoxic despite FiO2 70% and ABG revealed combined respiratory and metabolic acidosis with pH 7.17. He was started on BiPAP as well as a bicarb infusion at 50cc/hr. ABG with mild improvement. AM labs revealed K 6.2 and he was shifted and given kayexalate.Trialysis catheter was placed this morning and he subsequently developed hypotension and was started on levophed. He  was intubated later this morning. Nephrology consulted for CACHORRO. All history obtained by primary team and chart review as patient was intubated/sedated on exam. Consent for dialysis obtained by patient's wife and placed in chart. Of note, both of patient's parents were on dialysis.     Interval History:   Remained on SLED until around 9pm yesterday. Required levophed towards the end of treatment. No events overnight. Net negative 400cc yesterday. Interactive this morning; smiling. Family at bedside. Off pressors.     Review of patient's allergies indicates:   Allergen Reactions    No known drug allergies      Current Facility-Administered Medications   Medication Frequency    0.9%  NaCl infusion (CRRT USE ONLY) Continuous    0.9%  NaCl infusion (for blood administration) Q24H PRN    chlorhexidine 0.12 % solution 15 mL BID    dexmedetomidine (PRECEDEX) 400mcg/100mL 0.9% NaCL infusion Continuous    dextrose 50% injection 12.5 g PRN    dextrose 50% injection 25 g PRN    famotidine (PF) injection 20 mg Daily    fentaNYL injection 50 mcg Q1H PRN    glucagon (human recombinant) injection 1 mg PRN    glucose chewable tablet 16 g PRN    glucose chewable tablet 24 g PRN    heparin (porcine) injection 5,000 Units Q12H    insulin aspart U-100 pen 0-5 Units PRN    insulin regular (Humulin R) 100 Units in sodium chloride 0.9% 100 mL infusion Continuous    [START ON 3/31/2018] levothyroxine injection 88 mcg Daily    linezolid 600mg/300ml IVPB 600 mg Q12H    meropenem (MERREM) 2 g in sodium chloride 0.9% 100 mL IVPB Q12H    norepinephrine 4 mg in dextrose 5% 250 mL infusion (premix) (titrating) Continuous    sodium chloride 0.9% flush 3 mL Q8H    tacrolimus capsule 0.5 mg BID       Objective:     Vital Signs (Most Recent):  Temp: 97.8 °F (36.6 °C) (03/30/18 1100)  Pulse: (!) 117 (03/30/18 1324)  Resp: 18 (03/30/18 1324)  BP: 109/63 (03/30/18 0710)  SpO2: 100 % (03/30/18 1324)  O2 Device (Oxygen Therapy): Trach  Collar (03/30/18 1300) Vital Signs (24h Range):  Temp:  [97 °F (36.1 °C)-98.2 °F (36.8 °C)] 97.8 °F (36.6 °C)  Pulse:  [117-130] 117  Resp:  [12-28] 18  SpO2:  [100 %] 100 %  BP: ()/(53-76) 109/63  Arterial Line BP: ()/(48-75) 108/57     Weight: 73.3 kg (161 lb 9.6 oz) (03/30/18 0300)  Body mass index is 25.31 kg/m².  Body surface area is 1.86 meters squared.    I/O last 3 completed shifts:  In: 7689.4 [I.V.:5635.6; Blood:553.8; IV Piggyback:1500]  Out: 44136 [Other:74305]    Physical Exam   Constitutional: He appears well-developed and well-nourished.   HENT:   Head: Normocephalic and atraumatic.   Eyes: Conjunctivae and EOM are normal.   Neck: Neck supple. No tracheal deviation present.   Cardiovascular: Regular rhythm.  Tachycardia present.    Abdominal: Soft. There is no guarding.   Musculoskeletal: He exhibits no tenderness or deformity.   Skin: Skin is warm and dry. He is not diaphoretic.       Significant Labs:  CBC:   Recent Labs  Lab 03/30/18  0751   WBC 4.41   RBC 2.59*   HGB 7.8*   HCT 25.3*      MCV 98   MCH 30.1   MCHC 30.8*     CMP:   Recent Labs  Lab 03/30/18  0217   *   CALCIUM 8.5*   ALBUMIN 2.6*   PROT 6.3      K 5.4*   CO2 20*      BUN 15   CREATININE 1.2   ALKPHOS 223*   ALT 18   AST 31   BILITOT 1.3*     All labs within the past 24 hours have been reviewed.     Significant Imaging:  Labs: Reviewed    Assessment/Plan:     Acute renal failure superimposed on stage 4 chronic kidney disease    - baseline sCr 2.6-3.0 consistent with CKD stage IV  - anuric CACHORRO; likely ischemic ATN from prolonged pre-renal state (diarrhea) in setting of prograf renal vasoconstriction; cannot r/o septic ATN or Prograf toxicity  - SLED started 3/14  - remains anuric  - net negative 400cc yesterday. Required brief duration of levophed x2 yesterday during RRT  - RRT held overnight. Mild hyperkalemia and acidosis this AM  - SLED x 12 hours today for clearance. Decreased UF goal of  300-350cc/hr. Keep MAP > 65; okay to hold UF if hypotension develops. Would like for patient to remain off pressors.             Amairani Alegria, PGY-5  Nephrology Fellow  [facility]  Pager: [phone]

## 2018-03-30 NOTE — ASSESSMENT & PLAN NOTE
- TTE 3/26/18 showed normal graft function  - Continue hydrocortisone 25mg BID for now  - Continue Tacro 1 mg bid (giving half dose sublingually until enteral access)   - Cellcept remains on hold

## 2018-03-30 NOTE — PROGRESS NOTES
"[facility]  Endocrinology  Progress Note    Admit Date: 3/11/2018     Reason for Consult: abnormal TFTs    Surgical Procedure and Date: s/p heart transplant 2014     Diabetes diagnosis year: 5yo (T1DM)     Home Diabetes Medications:    Levemir 15u qHS  Novolog 4u AC     How often checking glucose at home? 4 times daily   BG readings on regimen: 150-200s  Hypoglycemia on the regimen?  Yes, rarely - treats appropriately (light snack, glucose tab)  Missed doses on regimen?  No        Diabetes Complications include:     Hyperglycemia, Hypoglycemia  and Diabetic chronic kidney disease          Complicating diabetes co morbidities:   N/A     HPI:   Patient is a 42 y.o. male with a diagnosis of T1DM, HTN, hypothyroidism, s/p heart transplant.  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 He required serial paracentesis and thoracentesis until he underwent a VATS with pleurX catheter placement on 1/11/2018 and removed 2/7/18.       Presented to hospital secondary to diarrhea and cough.  Upon initial labs, TSH was decreased (0.101) and free T4 1.33.  Endocrinology consulted to assist in management of these labs.  He was last seen in clinic by Kamala Vázquez NP 11/2017.   Previous hospitalization, endocrine consulted for same problem.  During that visit, recommended decreasing LT4 to 125mcg daily. Unfortunately, patient was discharged on previous dose of 150mcg daily.     Interval HPI:   Overnight events: NG tube placement was unsuccessful. Currently no source of enteral nutrition. On intensive insulin gtt with q1 currently. No definite plans for TPN per nursing.  Eating:   NPO  Hypoglycemia and intervention: No  Fever: No  TPN and/or TF: No    /63   Pulse (!) 123   Temp 98.1 °F (36.7 °C) (Axillary)   Resp (!) 23   Ht 5' 7" (1.702 m)   Wt 73.3 kg (161 lb 9.6 oz)   SpO2 100%   BMI 25.31 " kg/m²       Labs Reviewed and Include      Recent Labs  Lab 03/30/18  0217   *   CALCIUM 8.5*   ALBUMIN 2.6*   PROT 6.3      K 5.4*   CO2 20*      BUN 15   CREATININE 1.2   ALKPHOS 223*   ALT 18   AST 31   BILITOT 1.3*     Lab Results   Component Value Date    WBC 4.41 03/30/2018    HGB 7.8 (L) 03/30/2018    HCT 25.3 (L) 03/30/2018    MCV 98 03/30/2018     03/30/2018       Recent Labs  Lab 03/30/18  0217   TSH 9.412*   FREET4 0.81     Lab Results   Component Value Date    HGBA1C 6.9 (H) 02/11/2018       Nutritional status:   Body mass index is 25.31 kg/m².  Lab Results   Component Value Date    ALBUMIN 2.6 (L) 03/30/2018    ALBUMIN 2.7 (L) 03/29/2018    ALBUMIN 2.7 (L) 03/29/2018     Lab Results   Component Value Date    PREALBUMIN 5 (L) 03/15/2018    PREALBUMIN 18 (L) 03/24/2015    PREALBUMIN 19 (L) 12/17/2014       Estimated Creatinine Clearance: 73.4 mL/min (based on SCr of 1.2 mg/dL).    Accu-Checks  Recent Labs      03/30/18   0000  03/30/18   0109  03/30/18   0214  03/30/18   0306  03/30/18   0405  03/30/18   0454  03/30/18   0601  03/30/18   0712  03/30/18   0758  03/30/18   0910   POCTGLUCOSE  168*  177*  213*  217*  215*  216*  247*  211*  229*  165*       Current Medications and/or Treatments Impacting Glycemic Control  Immunotherapy:  Immunosuppressants         Stop Route Frequency     tacrolimus capsule 0.5 mg      -- SL 2 times daily        Steroids:   Hormones     None        Pressors:    Autonomic Drugs     Start     Stop Route Frequency Ordered    03/29/18 1045  norepinephrine 4 mg in dextrose 5% 250 mL infusion (premix) (titrating)     Question Answer Comment   Titrate by: (in mcg/kg/min) 0.02    Titrate interval: (in minutes) 15    Titrate to maintain: (MAP or SBP) MAP    Greater than: (in mmHg) 65    Maximum dose: (in mcg/kg/min) 2        -- IV Continuous 03/29/18 5502        Hyperglycemia/Diabetes Medications: Antihyperglycemics     Start     Stop Route Frequency Ordered     03/30/18 1030  insulin regular (Humulin R) 100 Units in sodium chloride 0.9% 100 mL infusion      -- IV Continuous 03/30/18 0927 03/30/18 1027  insulin aspart U-100 pen 0-5 Units      -- SubQ As needed (PRN) 03/30/18 0927          ASSESSMENT and PLAN    * Acute respiratory failure with hypoxia    Per primary        On enteral nutrition    No gastric access currently. TF on hold.        Type 1 diabetes mellitus with stage 3 chronic kidney disease    D/C intensive gtt.    Start transition at 1.2 units/hr  POC q4  Low dose correction    Notify endocrine for changes in nutrition status (diet, TF, TPN)    BG goal 140-180  Lab Results   Component Value Date    HGBA1C 6.9 (H) 02/11/2018     insulin gtt cannot be discontinued as he is a type 1 diabetic, discussed with nursing.    Discharge Recommendations:  TBD.        Hypothyroidism due to Hashimoto's thyroiditis    Abnormal TFTs upon admit.  Unclear if secondary to illness, but with evidence of iatrogenic hyperthyroidism in past while on above weight based dose.     TFTs indicate too little thyroid hormone. Possibly from missed doses from lack of PO access vs. Bowel edema with malabsorption. Will switch to IV LT4 88 mcg daily.  Recheck TFTs in 4 weeks.          Acute renal failure superimposed on stage 4 chronic kidney disease    Avoid insulin stacking          Heart transplanted    Per transplant  Avoid hypoglycemia              Corey Johnson MD  Endocrinology  [facility]

## 2018-03-30 NOTE — SUBJECTIVE & OBJECTIVE
"Interval HPI:   Overnight events: NG tube placement was unsuccessful. Currently no source of enteral nutrition. On intensive insulin gtt with q1 currently. No definite plans for TPN per nursing.  Eating:   NPO  Hypoglycemia and intervention: No  Fever: No  TPN and/or TF: No    /63   Pulse (!) 123   Temp 98.1 °F (36.7 °C) (Axillary)   Resp (!) 23   Ht 5' 7" (1.702 m)   Wt 73.3 kg (161 lb 9.6 oz)   SpO2 100%   BMI 25.31 kg/m²     Labs Reviewed and Include      Recent Labs  Lab 03/30/18  0217   *   CALCIUM 8.5*   ALBUMIN 2.6*   PROT 6.3      K 5.4*   CO2 20*      BUN 15   CREATININE 1.2   ALKPHOS 223*   ALT 18   AST 31   BILITOT 1.3*     Lab Results   Component Value Date    WBC 4.41 03/30/2018    HGB 7.8 (L) 03/30/2018    HCT 25.3 (L) 03/30/2018    MCV 98 03/30/2018     03/30/2018       Recent Labs  Lab 03/30/18  0217   TSH 9.412*   FREET4 0.81     Lab Results   Component Value Date    HGBA1C 6.9 (H) 02/11/2018       Nutritional status:   Body mass index is 25.31 kg/m².  Lab Results   Component Value Date    ALBUMIN 2.6 (L) 03/30/2018    ALBUMIN 2.7 (L) 03/29/2018    ALBUMIN 2.7 (L) 03/29/2018     Lab Results   Component Value Date    PREALBUMIN 5 (L) 03/15/2018    PREALBUMIN 18 (L) 03/24/2015    PREALBUMIN 19 (L) 12/17/2014       Estimated Creatinine Clearance: 73.4 mL/min (based on SCr of 1.2 mg/dL).    Accu-Checks  Recent Labs      03/30/18   0000  03/30/18   0109  03/30/18   0214  03/30/18   0306  03/30/18   0405  03/30/18   0454  03/30/18   0601  03/30/18   0712  03/30/18   0758  03/30/18   0910   POCTGLUCOSE  168*  177*  213*  217*  215*  216*  247*  211*  229*  165*       Current Medications and/or Treatments Impacting Glycemic Control  Immunotherapy:  Immunosuppressants         Stop Route Frequency     tacrolimus capsule 0.5 mg      -- SL 2 times daily        Steroids:   Hormones     None        Pressors:    Autonomic Drugs     Start     Stop Route Frequency Ordered    " 03/29/18 1045  norepinephrine 4 mg in dextrose 5% 250 mL infusion (premix) (titrating)     Question Answer Comment   Titrate by: (in mcg/kg/min) 0.02    Titrate interval: (in minutes) 15    Titrate to maintain: (MAP or SBP) MAP    Greater than: (in mmHg) 65    Maximum dose: (in mcg/kg/min) 2        -- IV Continuous 03/29/18 0945        Hyperglycemia/Diabetes Medications: Antihyperglycemics     Start     Stop Route Frequency Ordered    03/30/18 1030  insulin regular (Humulin R) 100 Units in sodium chloride 0.9% 100 mL infusion      -- IV Continuous 03/30/18 0927 03/30/18 1027  insulin aspart U-100 pen 0-5 Units      -- SubQ As needed (PRN) 03/30/18 0927

## 2018-03-30 NOTE — ASSESSMENT & PLAN NOTE
- S/P Bronch and intubation 3/14 now post tracheostomy   - Pulmonology managing vent wean  - Thoracic surgery following. Appreciate recs   - RSV positive early in hospitalization. Completed course of Ribavarin/IVIG. Per lung transplant protocol for severe RSV(given myelosuppression).   - ID following. Appreciate recs.  - Will finish 7 day course of meropenem/linezolid (last dose Monday morning)

## 2018-03-30 NOTE — PLAN OF CARE
Problem: Patient Care Overview  Goal: Plan of Care Review  Outcome: Ongoing (interventions implemented as appropriate)  See vital signs and assessments in flowsheets. See below for updates on today's progress.     Pulmonary: was on CPAP the entire shift, weaned down from 40% to 35% Fi02, sats %. Plan to wean off to trach collar this AM    Cardiovascular: St: 120s, good pulses, SBP: 100s, MAP > 60s, no PVCs or ectopies noted, no complaints of chest pain CVP: 5-7    Neurological: Withdrawn, follows simple commands, nods to questions, Afebrile, PERRLA    Gastrointestinal: NPO, no Bm,     Genitourinary: was on SLED CRRT, stopped at 2130, with plans to restart this Am. Anuric    Endocrine: CBG q1H, with sliding insulin drip    Integumentary/Other: Intact    Infusions: Insulin, KVO , ABx    Plan of care communicated and reviewed with Lv Crocker and family. All concerns addressed. Will continue to monitor.

## 2018-03-30 NOTE — ASSESSMENT & PLAN NOTE
D/C intensive gtt.    Start transition at 1.2 units/hr  POC q4  Low dose correction    Notify endocrine for changes in nutrition status (diet, TF, TPN)    BG goal 140-180  Lab Results   Component Value Date    HGBA1C 6.9 (H) 02/11/2018     insulin gtt cannot be discontinued as he is a type 1 diabetic, discussed with nursing.    Discharge Recommendations:  TBD.

## 2018-03-30 NOTE — PROGRESS NOTES
[facility]  Pulm/Critical Care - Medicine  Progress Note    Patient Name: Lv Crocker  MRN: [id number]  Admission Date: 3/11/2018  Hospital Length of Stay: 18 days  Code Status: Full Code  Attending Provider: Heriberto Rosa MD  Primary Care Provider: Philippe Mohr MD   Principal Problem: Acute respiratory failure with hypoxia    Subjective:   Doing great today. Stayed on PS 10/5 all night. This morning tolerated PS 5/5.  Review of Systems   Unable to obtain  Objective:     Vital Signs (Most Recent):  Temp: 97.8 °F (36.6 °C) (03/30/18 1100)  Pulse: (!) 117 (03/30/18 1324)  Resp: 18 (03/30/18 1324)  BP: 109/63 (03/30/18 0710)  SpO2: 100 % (03/30/18 1324) Vital Signs (24h Range):  Temp:  [97 °F (36.1 °C)-98.2 °F (36.8 °C)] 97.8 °F (36.6 °C)  Pulse:  [117-130] 117  Resp:  [12-28] 18  SpO2:  [100 %] 100 %  BP: ()/(53-76) 109/63  Arterial Line BP: ()/(48-75) 108/57     Weight: 73.3 kg (161 lb 9.6 oz)  Body mass index is 25.31 kg/m².      Intake/Output Summary (Last 24 hours) at 03/30/18 1327  Last data filed at 03/30/18 1300   Gross per 24 hour   Intake          3112.41 ml   Output             2944 ml   Net           168.41 ml       Physical Exam   Constitutional: He appears well-developed.    On mechanical ventilation. Follows commands. Ext weak   HENT:   Head: Normocephalic and atraumatic.   Neck: Neck supple.   Trach in place, site clean and dry   Cardiovascular: Regular rhythm.    tachycardic   Pulmonary/Chest: Effort normal.   On the vent--mechanical BS heard   Abdominal: Soft.   Neurological: He is alert.   Awake and follows some commands   Skin: Skin is warm and dry.       Vents:  Vent Mode: CPAP (03/30/18 1324)  Ventilator Initiated: Yes (03/14/18 0932)  Set Rate: 18 bmp (03/29/18 0722)  Vt Set: 410 mL (03/29/18 0722)  Pressure Support: 5 cmH20 (03/30/18 1324)  PEEP/CPAP: 5 cmH20 (03/30/18 1324)  Oxygen Concentration (%): 35 (03/30/18 1118)  Peak Airway Pressure: 10  cmH2O (03/30/18 1324)  Plateau Pressure: 20 cmH20 (03/29/18 0313)  Total Ve: 7.89 mL (03/29/18 1714)  F/VT Ratio<105 (RSBI): (!) 64.52 (03/30/18 1324)    Lines/Drains/Airways     Central Venous Catheter Line                 Trialysis (Dialysis) Catheter 03/23/18 1433 right internal jugular 6 days          Airway                 Surgical Airway 03/28/18 1625 Shiley 1 day          Arterial Line                 Arterial Line 03/14/18 1600 Right Femoral 15 days          Peripheral Intravenous Line                 Midline Catheter Insertion/Assessment  - Single Lumen 03/17/18 1454 Left cephalic vein (lateral side of arm) 18g x 8cm 12 days         Peripheral IV - Single Lumen 03/20/18 1640 Right Forearm 9 days                Significant Labs:    CBC/Anemia Profile:    Recent Labs  Lab 03/29/18  1534 03/29/18  1927 03/30/18  0751   WBC 5.91 5.34 4.41   HGB 8.7* 8.4* 7.8*   HCT 27.1* 26.9* 25.3*    203 190   MCV 97 96 98   RDW 18.9* 18.9* 19.1*        Chemistries:    Recent Labs  Lab 03/29/18  0254 03/29/18  0751 03/29/18  1421 03/29/18  1535 03/29/18  2119 03/30/18  0217 03/30/18  0751     139  --  138  --  140 137  --    K 5.4*  5.4*  --  5.1  --  4.9 5.4*  --      106  --  105  --  105 104  --    CO2 19*  19*  --  22*  --  25 20*  --    BUN 33*  33*  --  12  --  6 15  --    CREATININE 2.0*  2.0*  --  0.9  --  0.7 1.2  --    CALCIUM 8.8  8.8  --  8.3*  --  8.3* 8.5*  --    ALBUMIN 2.6*  2.6*  --  2.7*  --  2.7* 2.6*  --    PROT 6.7  --   --   --   --  6.3  --    BILITOT 1.1*  --   --   --   --  1.3*  --    ALKPHOS 216*  --   --   --   --  223*  --    ALT 26  --   --   --   --  18  --    AST 36  --   --   --   --  31  --    MG  --  1.8  1.8  --  1.9  --   --  1.9   PHOS 4.7*  --  2.5*  --  2.1*  --   --            Assessment/Plan:   1. Acute on chronic hypoxemic resp failure      42 year-old male with a PMH of a heart transplant who was admitted to the hospital on 3/11 for diarrhea and cough. He  was intubated on 3/14 for hypoxemic and hypercapnic resp failure. RSV +ve on his resp PCR. Because of critical illness weakness that could be related to the steroids, benzos and Ribavirin he could not be liberated from the vent. Thus, trach was performed 3/29. Breathing comfortably on PS 5/5. Plan:    -Trach collar trial today (max 1-2 hrs as tolerated). PS during the day and back on assist control at night  -Antibiotic recs per ID-7 day course of meropenum and linezolid  -Perform u/s of bladder to see if he is starting to make urine  -Levophed (on and off). Maintain MAP > 65  -Aggressive PT. Sit with assistance.   -DVT prophylaxis    Updated family. Call for any questions.  Geri Stringer MD  Critical Care - Medicine  [facility]

## 2018-03-30 NOTE — PROGRESS NOTES
[facility]  Heart Transplant  Progress Note    Patient Name: Lv Crocker  MRN: [id number]  Admission Date: 3/11/2018  Hospital Length of Stay: 18 days  Attending Physician: Heriberto Rosa MD  Primary Care Provider: Philippe Mohr MD  Principal Problem:Acute respiratory failure with hypoxia    Subjective:     Interval History: Levophed off once CRRT stopped. Stable on trach collar this AM. Seems more interactive. Will not pursue cortrac / NG tube.     Continuous Infusions:   sodium chloride 0.9% 200 mL/hr at 03/30/18 1318    insulin (HUMAN R) infusion (adults) 0.5 Units/hr (03/30/18 1700)    norepinephrine bitartrate-D5W Stopped (03/30/18 0000)     Scheduled Meds:   chlorhexidine  15 mL Mouth/Throat BID    famotidine (PF)  20 mg Intravenous Daily    heparin (porcine)  5,000 Units Subcutaneous Q12H    [START ON 3/31/2018] hydrocortisone sodium succinate  25 mg Intravenous BID    [START ON 3/31/2018] levothyroxine  88 mcg Intravenous Daily    linezolid 600mg/300ml  600 mg Intravenous Q12H    meropenem (MERREM) IVPB  2 g Intravenous Q12H    sodium chloride 0.9%  3 mL Intravenous Q8H    tacrolimus  0.5 mg Sublingual BID     PRN Meds:sodium chloride, dextrose 50%, dextrose 50%, fentaNYL, glucagon (human recombinant), glucose, glucose, insulin aspart U-100    Review of patient's allergies indicates:   Allergen Reactions    No known drug allergies      Objective:     Vital Signs (Most Recent):  Temp: 96.9 °F (36.1 °C) (03/30/18 1500)  Pulse: (!) 116 (03/30/18 1705)  Resp: 19 (03/30/18 1705)  BP: 99/63 (03/30/18 1705)  SpO2: 100 % (03/30/18 1705) Vital Signs (24h Range):  Temp:  [96.9 °F (36.1 °C)-98.2 °F (36.8 °C)] 96.9 °F (36.1 °C)  Pulse:  [116-130] 116  Resp:  [12-28] 19  SpO2:  [100 %] 100 %  BP: ()/(53-76) 99/63  Arterial Line BP: ()/(48-75) 108/57     Patient Vitals for the past 72 hrs (Last 3 readings):   Weight   03/30/18 0300 73.3 kg (161 lb 9.6 oz)    03/28/18 0400 73.2 kg (161 lb 6 oz)     Body mass index is 25.31 kg/m².      Intake/Output Summary (Last 24 hours) at 03/30/18 1713  Last data filed at 03/30/18 1700   Gross per 24 hour   Intake          3098.16 ml   Output             2682 ml   Net           416.16 ml     Telemetry: ST    Physical Exam   Constitutional: He appears well-developed and well-nourished.   Chronically ill    HENT:   Head: Normocephalic and atraumatic.   Eyes: Conjunctivae are normal. Pupils are equal, round, and reactive to light.   Neck: No JVD present. No thyromegaly present.   RIJ trialysis   Tracheostomy (C/D/I)   Cardiovascular: Regular rhythm.    Tachycardic   Pulmonary/Chest:   Coarse breath sounds throughout   Mostly clear    Abdominal: Soft. Bowel sounds are normal.   Musculoskeletal:   Trace - 1+ gen edema   Neurological:   Arouses to voice and nods head appropriately   Skin: Skin is warm and dry. Capillary refill takes less than 2 seconds. There is pallor.     Significant Labs:  CBC:    Recent Labs  Lab 03/29/18  1534 03/29/18  1927 03/30/18  0751   WBC 5.91 5.34 4.41   RBC 2.80* 2.79* 2.59*   HGB 8.7* 8.4* 7.8*   HCT 27.1* 26.9* 25.3*    203 190   MCV 97 96 98   MCH 31.1* 30.1 30.1   MCHC 32.1 31.2* 30.8*     BNP:  No results for input(s): BNP in the last 168 hours.    Invalid input(s): BNPTRIAGELBLO  CMP:    Recent Labs  Lab 03/28/18  0356  03/29/18  0254  03/29/18  2119 03/30/18  0217 03/30/18  1415   *  118*  118*  < > 167*  167*  < > 124* 222* 111*   CALCIUM 8.3*  8.3*  8.3*  < > 8.8  8.8  < > 8.3* 8.5* 8.4*   ALBUMIN 2.4*  2.4*  2.4*  < > 2.6*  2.6*  < > 2.7* 2.6* 2.6*   PROT 6.2  --  6.7  --   --  6.3  --      143  143  < > 139  139  < > 140 137 139   K 4.8  4.8  4.8  < > 5.4*  5.4*  < > 4.9 5.4* 4.6   CO2 24  24  24  < > 19*  19*  < > 25 20* 25     108  108  < > 106  106  < > 105 104 106   BUN 7  7  7  < > 33*  33*  < > 6 15 16   CREATININE 0.6  0.6  0.6  < > 2.0*   2.0*  < > 0.7 1.2 1.1   ALKPHOS 231*  --  216*  --   --  223*  --    ALT 29  --  26  --   --  18  --    AST 42*  --  36  --   --  31  --    BILITOT 1.2*  --  1.1*  --   --  1.3*  --    < > = values in this interval not displayed.   Coagulation:     Recent Labs  Lab 03/26/18  1659 03/26/18 2017 03/27/18  0801   INR 1.0 1.0 1.0     LDH:  No results for input(s): LDH in the last 72 hours.  Microbiology:  Microbiology Results (last 7 days)     Procedure Component Value Units Date/Time    AFB Culture & Smear [[id number]] Collected:  03/28/18 1607    Order Status:  Completed Specimen:  Body Fluid from Lung, RLL Updated:  03/29/18 2127     AFB Culture & Smear Culture in progress     AFB CULTURE STAIN No acid fast bacilli seen.    Blood culture [[id number]] Collected:  03/25/18 1554    Order Status:  Completed Specimen:  Blood from Peripheral, Upper Arm, Right Updated:  03/29/18 1812     Blood Culture, Routine No Growth to date     Blood Culture, Routine No Growth to date     Blood Culture, Routine No Growth to date     Blood Culture, Routine No Growth to date     Blood Culture, Routine No Growth to date    Blood culture [[id number]] Collected:  03/25/18 1606    Order Status:  Completed Specimen:  Blood from Peripheral, Forearm, Left Updated:  03/29/18 1812     Blood Culture, Routine No Growth to date     Blood Culture, Routine No Growth to date     Blood Culture, Routine No Growth to date     Blood Culture, Routine No Growth to date     Blood Culture, Routine No Growth to date    Culture, Anaerobe [[id number]] Collected:  03/28/18 1607    Order Status:  Completed Specimen:  Body Fluid from Lung, RLL Updated:  03/29/18 1329     Anaerobic Culture Culture in progress    Aerobic culture [[id number]] Collected:  03/28/18 1607    Order Status:  Completed Specimen:  Body Fluid from Lung, RLL Updated:  03/29/18 0938     Aerobic Bacterial Culture No growth    Gram stain [[id number]] Collected:  03/28/18 1607    Order Status:  Completed  Specimen:  Body Fluid from Lung, RLL Updated:  03/28/18 1654     Gram Stain Result Rare WBC's      No organisms seen    Fungus culture [[id number]] Collected:  03/28/18 1607    Order Status:  Sent Specimen:  Body Fluid from Lung, RLL Updated:  03/28/18 1617    Fungus culture [[id number]] Collected:  03/14/18 1137    Order Status:  Completed Specimen:  Bronchial Wash from Amniotic Fluid Updated:  03/28/18 0933     Fungus (Mycology) Culture Culture in progress     Fungus (Mycology) Culture No fungus isolated after 2 weeks    Culture, Respiratory with Gram Stain [[id number]] Collected:  03/25/18 1534    Order Status:  Completed Specimen:  Respiratory from Sputum, Induced Updated:  03/27/18 0739     Respiratory Culture Normal respiratory hank     Gram Stain (Respiratory) <10 epithelial cells per low power field.     Gram Stain (Respiratory) Rare WBC's     Gram Stain (Respiratory) Rare Gram positive cocci    Blood culture [[id number]] Collected:  03/21/18 0815    Order Status:  Completed Specimen:  Blood from Peripheral, Forearm, Left Updated:  03/26/18 1012     Blood Culture, Routine No growth after 5 days.    Urine culture [[id number]]     Order Status:  Canceled Specimen:  Urine         I have reviewed all pertinent labs within the past 24 hours.    Estimated Creatinine Clearance: 80.1 mL/min (based on SCr of 1.1 mg/dL).    Diagnostic Results:  I have reviewed all pertinent imaging results/findings within the past 24 hours.    Assessment and Plan:     * Acute respiratory failure with hypoxia    - S/P Bronch and intubation 3/14 now post tracheostomy   - Pulmonology managing vent wean  - Thoracic surgery following. Appreciate recs   - RSV positive early in hospitalization. Completed course of Ribavarin/IVIG. Per lung transplant protocol for severe RSV(given myelosuppression).   - ID following. Appreciate recs.  - Will finish 7 day course of meropenem/linezolid (last dose Monday morning)         Heart transplanted    - TTE  3/26/18 showed normal graft function  - Continue hydrocortisone 25mg BID for now  - Continue Tacro 1 mg bid (giving half dose sublingually until enteral access)   - Cellcept remains on hold        Type 1 diabetes mellitus with stage 3 chronic kidney disease    - Appreciate Endocrine's recs  - Insulin gtt        Hypothyroidism due to Hashimoto's thyroiditis    - Appreciate Endocrine's recs  - IV levothyroxine 88mcg ordered kristineyl         Restrictive cardiomyopathy    - S/P thoracentesis X 2, followed by VATS/pleurex cath placement (January 2018) with removal of pleurex (February 2018) and 3rd thoracentesis 2/14/18 with no significant findings on fluid removal.  - Moderate right pleural effusion stable by CXR 3/11/18 and chest CT 3/13/18. CT 3/27/18 as above          Acute renal failure superimposed on stage 4 chronic kidney disease    - Appreciate Nephrology recs.   - Intermittent CRRT   - No longer requiring levophed for CRRT         Anemia    - Hgb stable at 7.8 today         Debility    - PT/OT            Mamadou rhodes, DO  Heart Transplant  [facility]

## 2018-03-30 NOTE — SUBJECTIVE & OBJECTIVE
Interval History: Levophed off once CRRT stopped. Stable on trach collar this AM. Seems more interactive. Will not pursue cortrac / NG tube.     Continuous Infusions:   sodium chloride 0.9% 200 mL/hr at 03/30/18 1318    insulin (HUMAN R) infusion (adults) 0.5 Units/hr (03/30/18 1700)    norepinephrine bitartrate-D5W Stopped (03/30/18 0000)     Scheduled Meds:   chlorhexidine  15 mL Mouth/Throat BID    famotidine (PF)  20 mg Intravenous Daily    heparin (porcine)  5,000 Units Subcutaneous Q12H    [START ON 3/31/2018] hydrocortisone sodium succinate  25 mg Intravenous BID    [START ON 3/31/2018] levothyroxine  88 mcg Intravenous Daily    linezolid 600mg/300ml  600 mg Intravenous Q12H    meropenem (MERREM) IVPB  2 g Intravenous Q12H    sodium chloride 0.9%  3 mL Intravenous Q8H    tacrolimus  0.5 mg Sublingual BID     PRN Meds:sodium chloride, dextrose 50%, dextrose 50%, fentaNYL, glucagon (human recombinant), glucose, glucose, insulin aspart U-100    Review of patient's allergies indicates:   Allergen Reactions    No known drug allergies      Objective:     Vital Signs (Most Recent):  Temp: 96.9 °F (36.1 °C) (03/30/18 1500)  Pulse: (!) 116 (03/30/18 1705)  Resp: 19 (03/30/18 1705)  BP: 99/63 (03/30/18 1705)  SpO2: 100 % (03/30/18 1705) Vital Signs (24h Range):  Temp:  [96.9 °F (36.1 °C)-98.2 °F (36.8 °C)] 96.9 °F (36.1 °C)  Pulse:  [116-130] 116  Resp:  [12-28] 19  SpO2:  [100 %] 100 %  BP: ()/(53-76) 99/63  Arterial Line BP: ()/(48-75) 108/57     Patient Vitals for the past 72 hrs (Last 3 readings):   Weight   03/30/18 0300 73.3 kg (161 lb 9.6 oz)   03/28/18 0400 73.2 kg (161 lb 6 oz)     Body mass index is 25.31 kg/m².      Intake/Output Summary (Last 24 hours) at 03/30/18 1713  Last data filed at 03/30/18 1700   Gross per 24 hour   Intake          3098.16 ml   Output             2682 ml   Net           416.16 ml     Telemetry: ST    Physical Exam   Constitutional: He appears well-developed  and well-nourished.   Chronically ill    HENT:   Head: Normocephalic and atraumatic.   Eyes: Conjunctivae are normal. Pupils are equal, round, and reactive to light.   Neck: No JVD present. No thyromegaly present.   RIJ trialysis   Tracheostomy (C/D/I)   Cardiovascular: Regular rhythm.    Tachycardic   Pulmonary/Chest:   Coarse breath sounds throughout   Mostly clear    Abdominal: Soft. Bowel sounds are normal.   Musculoskeletal:   Trace - 1+ gen edema   Neurological:   Arouses to voice and nods head appropriately   Skin: Skin is warm and dry. Capillary refill takes less than 2 seconds. There is pallor.     Significant Labs:  CBC:    Recent Labs  Lab 03/29/18  1534 03/29/18  1927 03/30/18  0751   WBC 5.91 5.34 4.41   RBC 2.80* 2.79* 2.59*   HGB 8.7* 8.4* 7.8*   HCT 27.1* 26.9* 25.3*    203 190   MCV 97 96 98   MCH 31.1* 30.1 30.1   MCHC 32.1 31.2* 30.8*     BNP:  No results for input(s): BNP in the last 168 hours.    Invalid input(s): BNPTRIAGELBLO  CMP:    Recent Labs  Lab 03/28/18  0356  03/29/18  0254  03/29/18  2119 03/30/18  0217 03/30/18  1415   *  118*  118*  < > 167*  167*  < > 124* 222* 111*   CALCIUM 8.3*  8.3*  8.3*  < > 8.8  8.8  < > 8.3* 8.5* 8.4*   ALBUMIN 2.4*  2.4*  2.4*  < > 2.6*  2.6*  < > 2.7* 2.6* 2.6*   PROT 6.2  --  6.7  --   --  6.3  --      143  143  < > 139  139  < > 140 137 139   K 4.8  4.8  4.8  < > 5.4*  5.4*  < > 4.9 5.4* 4.6   CO2 24  24  24  < > 19*  19*  < > 25 20* 25     108  108  < > 106  106  < > 105 104 106   BUN 7  7  7  < > 33*  33*  < > 6 15 16   CREATININE 0.6  0.6  0.6  < > 2.0*  2.0*  < > 0.7 1.2 1.1   ALKPHOS 231*  --  216*  --   --  223*  --    ALT 29  --  26  --   --  18  --    AST 42*  --  36  --   --  31  --    BILITOT 1.2*  --  1.1*  --   --  1.3*  --    < > = values in this interval not displayed.   Coagulation:     Recent Labs  Lab 03/26/18  1659 03/26/18 2017 03/27/18  0801   INR 1.0 1.0 1.0     LDH:  No results  for input(s): LDH in the last 72 hours.  Microbiology:  Microbiology Results (last 7 days)     Procedure Component Value Units Date/Time    AFB Culture & Smear [[id number]] Collected:  03/28/18 1607    Order Status:  Completed Specimen:  Body Fluid from Lung, RLL Updated:  03/29/18 2127     AFB Culture & Smear Culture in progress     AFB CULTURE STAIN No acid fast bacilli seen.    Blood culture [[id number]] Collected:  03/25/18 1554    Order Status:  Completed Specimen:  Blood from Peripheral, Upper Arm, Right Updated:  03/29/18 1812     Blood Culture, Routine No Growth to date     Blood Culture, Routine No Growth to date     Blood Culture, Routine No Growth to date     Blood Culture, Routine No Growth to date     Blood Culture, Routine No Growth to date    Blood culture [[id number]] Collected:  03/25/18 1606    Order Status:  Completed Specimen:  Blood from Peripheral, Forearm, Left Updated:  03/29/18 1812     Blood Culture, Routine No Growth to date     Blood Culture, Routine No Growth to date     Blood Culture, Routine No Growth to date     Blood Culture, Routine No Growth to date     Blood Culture, Routine No Growth to date    Culture, Anaerobe [[id number]] Collected:  03/28/18 1607    Order Status:  Completed Specimen:  Body Fluid from Lung, RLL Updated:  03/29/18 1329     Anaerobic Culture Culture in progress    Aerobic culture [[id number]] Collected:  03/28/18 1607    Order Status:  Completed Specimen:  Body Fluid from Lung, RLL Updated:  03/29/18 0938     Aerobic Bacterial Culture No growth    Gram stain [[id number]] Collected:  03/28/18 1607    Order Status:  Completed Specimen:  Body Fluid from Lung, RLL Updated:  03/28/18 1654     Gram Stain Result Rare WBC's      No organisms seen    Fungus culture [[id number]] Collected:  03/28/18 1607    Order Status:  Sent Specimen:  Body Fluid from Lung, RLL Updated:  03/28/18 1617    Fungus culture [[id number]] Collected:  03/14/18 1137    Order Status:  Completed Specimen:   Bronchial Wash from Amniotic Fluid Updated:  03/28/18 0933     Fungus (Mycology) Culture Culture in progress     Fungus (Mycology) Culture No fungus isolated after 2 weeks    Culture, Respiratory with Gram Stain [[id number]] Collected:  03/25/18 1534    Order Status:  Completed Specimen:  Respiratory from Sputum, Induced Updated:  03/27/18 0739     Respiratory Culture Normal respiratory hank     Gram Stain (Respiratory) <10 epithelial cells per low power field.     Gram Stain (Respiratory) Rare WBC's     Gram Stain (Respiratory) Rare Gram positive cocci    Blood culture [[id number]] Collected:  03/21/18 0815    Order Status:  Completed Specimen:  Blood from Peripheral, Forearm, Left Updated:  03/26/18 1012     Blood Culture, Routine No growth after 5 days.    Urine culture [[id number]]     Order Status:  Canceled Specimen:  Urine         I have reviewed all pertinent labs within the past 24 hours.    Estimated Creatinine Clearance: 80.1 mL/min (based on SCr of 1.1 mg/dL).    Diagnostic Results:  I have reviewed all pertinent imaging results/findings within the past 24 hours.

## 2018-03-30 NOTE — ASSESSMENT & PLAN NOTE
- baseline sCr 2.6-3.0 consistent with CKD stage IV  - anuric CACHORRO; likely ischemic ATN from prolonged pre-renal state (diarrhea) in setting of prograf renal vasoconstriction; cannot r/o septic ATN or Prograf toxicity  - SLED started 3/14  - remains anuric  - net negative 400cc yesterday. Required brief duration of levophed x2 yesterday during RRT  - RRT held overnight. Mild hyperkalemia and acidosis this AM  - SLED x 12 hours today for clearance. Decreased UF goal of 300-350cc/hr. Keep MAP > 65; okay to hold UF if hypotension develops. Would like for patient to remain off pressors.

## 2018-03-30 NOTE — SUBJECTIVE & OBJECTIVE
Interval History:   Remained on SLED until around 9pm yesterday. Required levophed towards the end of treatment. No events overnight. Net negative 400cc yesterday. Interactive this morning; smiling. Family at bedside. Off pressors.     Review of patient's allergies indicates:   Allergen Reactions    No known drug allergies      Current Facility-Administered Medications   Medication Frequency    0.9%  NaCl infusion (CRRT USE ONLY) Continuous    0.9%  NaCl infusion (for blood administration) Q24H PRN    chlorhexidine 0.12 % solution 15 mL BID    dexmedetomidine (PRECEDEX) 400mcg/100mL 0.9% NaCL infusion Continuous    dextrose 50% injection 12.5 g PRN    dextrose 50% injection 25 g PRN    famotidine (PF) injection 20 mg Daily    fentaNYL injection 50 mcg Q1H PRN    glucagon (human recombinant) injection 1 mg PRN    glucose chewable tablet 16 g PRN    glucose chewable tablet 24 g PRN    heparin (porcine) injection 5,000 Units Q12H    insulin aspart U-100 pen 0-5 Units PRN    insulin regular (Humulin R) 100 Units in sodium chloride 0.9% 100 mL infusion Continuous    [START ON 3/31/2018] levothyroxine injection 88 mcg Daily    linezolid 600mg/300ml IVPB 600 mg Q12H    meropenem (MERREM) 2 g in sodium chloride 0.9% 100 mL IVPB Q12H    norepinephrine 4 mg in dextrose 5% 250 mL infusion (premix) (titrating) Continuous    sodium chloride 0.9% flush 3 mL Q8H    tacrolimus capsule 0.5 mg BID       Objective:     Vital Signs (Most Recent):  Temp: 97.8 °F (36.6 °C) (03/30/18 1100)  Pulse: (!) 117 (03/30/18 1324)  Resp: 18 (03/30/18 1324)  BP: 109/63 (03/30/18 0710)  SpO2: 100 % (03/30/18 1324)  O2 Device (Oxygen Therapy): Trach Collar (03/30/18 1300) Vital Signs (24h Range):  Temp:  [97 °F (36.1 °C)-98.2 °F (36.8 °C)] 97.8 °F (36.6 °C)  Pulse:  [117-130] 117  Resp:  [12-28] 18  SpO2:  [100 %] 100 %  BP: ()/(53-76) 109/63  Arterial Line BP: ()/(48-75) 108/57     Weight: 73.3 kg (161 lb 9.6 oz)  (03/30/18 0300)  Body mass index is 25.31 kg/m².  Body surface area is 1.86 meters squared.    I/O last 3 completed shifts:  In: 7689.4 [I.V.:5635.6; Blood:553.8; IV Piggyback:1500]  Out: 18992 [Other:37794]    Physical Exam   Constitutional: He appears well-developed and well-nourished.   HENT:   Head: Normocephalic and atraumatic.   Eyes: Conjunctivae and EOM are normal.   Neck: Neck supple. No tracheal deviation present.   Cardiovascular: Regular rhythm.  Tachycardia present.    Abdominal: Soft. There is no guarding.   Musculoskeletal: He exhibits no tenderness or deformity.   Skin: Skin is warm and dry. He is not diaphoretic.       Significant Labs:  CBC:   Recent Labs  Lab 03/30/18  0751   WBC 4.41   RBC 2.59*   HGB 7.8*   HCT 25.3*      MCV 98   MCH 30.1   MCHC 30.8*     CMP:   Recent Labs  Lab 03/30/18  0217   *   CALCIUM 8.5*   ALBUMIN 2.6*   PROT 6.3      K 5.4*   CO2 20*      BUN 15   CREATININE 1.2   ALKPHOS 223*   ALT 18   AST 31   BILITOT 1.3*     All labs within the past 24 hours have been reviewed.     Significant Imaging:  Labs: Reviewed

## 2018-03-30 NOTE — ASSESSMENT & PLAN NOTE
Abnormal TFTs upon admit.  Unclear if secondary to illness, but with evidence of iatrogenic hyperthyroidism in past while on above weight based dose.     TFTs indicate too little thyroid hormone. Possibly from missed doses from lack of PO access vs. Bowel edema with malabsorption. Will switch to IV LT4 88 mcg daily.  Recheck TFTs in 4 weeks.

## 2018-03-30 NOTE — ASSESSMENT & PLAN NOTE
- S/P thoracentesis X 2, followed by VATS/pleurex cath placement (January 2018) with removal of pleurex (February 2018) and 3rd thoracentesis 2/14/18 with no significant findings on fluid removal.  - Moderate right pleural effusion stable by CXR 3/11/18 and chest CT 3/13/18. CT 3/27/18 as above

## 2018-03-31 PROBLEM — R57.9 SHOCK, UNSPECIFIED: Status: RESOLVED | Noted: 2018-01-01 | Resolved: 2018-01-01

## 2018-03-31 NOTE — ASSESSMENT & PLAN NOTE
- Appreciate Endocrine's recs  - Continue IV levothyroxine 88mcg with plan to switch back to oral once enteral access is obtained

## 2018-03-31 NOTE — ASSESSMENT & PLAN NOTE
Abnormal TFTs upon admit.  Unclear if secondary to illness, but with evidence of iatrogenic hyperthyroidism in past while on above weight based dose.     TFTs indicate too little thyroid hormone. Possibly from missed doses from lack of PO access vs. Bowel edema with malabsorption.     Continue IV LT4   88 mcg daily.  Recheck TFTs in 4 weeks.

## 2018-03-31 NOTE — ASSESSMENT & PLAN NOTE
- TTE reviewed.   - S/P thoracentesis X 2, followed by VATS/pleurex cath placement (January 2018) with removal of pleurex (February 2018) and 3rd thoracentesis 2/14/18 with no significant findings on fluid removal.

## 2018-03-31 NOTE — PROGRESS NOTES
[facility]  Heart Transplant  Progress Note    Patient Name: vL Crocker  MRN: [id number]  Admission Date: 3/11/2018  Hospital Length of Stay: 19 days  Attending Physician: Heriberto Rosa MD  Primary Care Provider: Philippe Mohr MD  Principal Problem:Acute respiratory failure with hypoxia    Subjective:     Interval History: Less interactive this AM (didn't sleep well per night nurse). No acute events overnight.     Continuous Infusions:   sodium chloride 0.9%      insulin (HUMAN R) infusion (adults) 0.5 Units/hr (03/31/18 1600)    norepinephrine bitartrate-D5W       Scheduled Meds:   chlorhexidine  15 mL Mouth/Throat BID    famotidine (PF)  20 mg Intravenous Daily    heparin (porcine)  5,000 Units Subcutaneous Q12H    hydrocortisone sodium succinate  25 mg Intravenous BID    levothyroxine  88 mcg Intravenous Daily    linezolid 600mg/300ml  600 mg Intravenous Q12H    meropenem (MERREM) IVPB  2 g Intravenous Q12H    sodium chloride 0.9%  3 mL Intravenous Q8H    tacrolimus  0.5 mg Sublingual BID     PRN Meds:sodium chloride, dextrose 50%, dextrose 50%, fentaNYL, glucagon (human recombinant), glucose, glucose, insulin aspart U-100    Review of patient's allergies indicates:   Allergen Reactions    No known drug allergies      Objective:     Vital Signs (Most Recent):  Temp: 97.4 °F (36.3 °C) (03/31/18 1507)  Pulse: (!) 117 (03/31/18 1600)  Resp: 15 (03/31/18 1600)  BP: 134/65 (03/31/18 1400)  SpO2: 100 % (03/31/18 1600) Vital Signs (24h Range):  Temp:  [97.4 °F (36.3 °C)-98.4 °F (36.9 °C)] 97.4 °F (36.3 °C)  Pulse:  [116-127] 117  Resp:  [14-33] 15  SpO2:  [100 %] 100 %  BP: ()/(55-65) 134/65  Arterial Line BP: ()/(43-59) 127/58     Patient Vitals for the past 72 hrs (Last 3 readings):   Weight   03/31/18 0400 73.2 kg (161 lb 6 oz)   03/30/18 0300 73.3 kg (161 lb 9.6 oz)     Body mass index is 25.28 kg/m².      Intake/Output Summary (Last 24 hours) at 03/31/18  1704  Last data filed at 03/31/18 1600   Gross per 24 hour   Intake          2521.61 ml   Output             2472 ml   Net            49.61 ml     Telemetry: ST    Physical Exam   Constitutional:   Chronically ill    HENT:   Head: Normocephalic and atraumatic.   Eyes: Conjunctivae are normal. Pupils are equal, round, and reactive to light.   Neck: No JVD present. No thyromegaly present.   RIJ trialysis   Tracheostomy (C/D/I)   Cardiovascular: Regular rhythm.    Tachycardic   Pulmonary/Chest:   Coarse breath sounds throughout   Mostly clear    Abdominal: Soft. Bowel sounds are normal.   Musculoskeletal:   Trace - 1+ gen edema   Neurological:   Arouses to voice and nods head appropriately   Skin: Skin is warm and dry. Capillary refill takes less than 2 seconds. There is pallor.     Significant Labs:  CBC:    Recent Labs  Lab 03/30/18  0751 03/30/18 2122 03/31/18  0807   WBC 4.41 4.56 4.56   RBC 2.59* 2.45* 2.25*   HGB 7.8* 7.6* 7.0*   HCT 25.3* 24.6* 22.4*    175 140*   MCV 98 100* 100*   MCH 30.1 31.0 31.1*   MCHC 30.8* 30.9* 31.3*     CMP:    Recent Labs  Lab 03/29/18  0254  03/30/18  0217 03/30/18  1415 03/30/18 2122 03/31/18  0438   *  167*  < > 222* 111* 124* 146*  146*   CALCIUM 8.8  8.8  < > 8.5* 8.4* 8.2* 8.4*  8.4*   ALBUMIN 2.6*  2.6*  < > 2.6* 2.6* 2.5* 2.5*  2.5*   PROT 6.7  --  6.3  --   --  6.1     139  < > 137 139 141 140  140   K 5.4*  5.4*  < > 5.4* 4.6 4.6 4.8  4.8   CO2 19*  19*  < > 20* 25 26 26  26     106  < > 104 106 107 106  106   BUN 33*  33*  < > 15 16 6 6  6   CREATININE 2.0*  2.0*  < > 1.2 1.1 0.6 0.8  0.8   ALKPHOS 216*  --  223*  --   --  193*   ALT 26  --  18  --   --  17   AST 36  --  31  --   --  28   BILITOT 1.1*  --  1.3*  --   --  1.2*   < > = values in this interval not displayed.     Microbiology:  Microbiology Results (last 7 days)     Procedure Component Value Units Date/Time    Aerobic culture [[id number]] Collected:  03/28/18 9283     Order Status:  Completed Specimen:  Body Fluid from Lung, RLL Updated:  03/31/18 1209     Aerobic Bacterial Culture No growth    Blood culture [[id number]] Collected:  03/25/18 1554    Order Status:  Completed Specimen:  Blood from Peripheral, Upper Arm, Right Updated:  03/30/18 1812     Blood Culture, Routine No growth after 5 days.    Blood culture [[id number]] Collected:  03/25/18 1606    Order Status:  Completed Specimen:  Blood from Peripheral, Forearm, Left Updated:  03/30/18 1812     Blood Culture, Routine No growth after 5 days.    AFB Culture & Smear [[id number]] Collected:  03/28/18 1607    Order Status:  Completed Specimen:  Body Fluid from Lung, RLL Updated:  03/29/18 2127     AFB Culture & Smear Culture in progress     AFB CULTURE STAIN No acid fast bacilli seen.    Culture, Anaerobe [[id number]] Collected:  03/28/18 1607    Order Status:  Completed Specimen:  Body Fluid from Lung, RLL Updated:  03/29/18 1329     Anaerobic Culture Culture in progress    Gram stain [[id number]] Collected:  03/28/18 1607    Order Status:  Completed Specimen:  Body Fluid from Lung, RLL Updated:  03/28/18 1654     Gram Stain Result Rare WBC's      No organisms seen    Fungus culture [[id number]] Collected:  03/28/18 1607    Order Status:  Sent Specimen:  Body Fluid from Lung, RLL Updated:  03/28/18 1617    Fungus culture [[id number]] Collected:  03/14/18 1137    Order Status:  Completed Specimen:  Bronchial Wash from Amniotic Fluid Updated:  03/28/18 0933     Fungus (Mycology) Culture Culture in progress     Fungus (Mycology) Culture No fungus isolated after 2 weeks    Culture, Respiratory with Gram Stain [[id number]] Collected:  03/25/18 1534    Order Status:  Completed Specimen:  Respiratory from Sputum, Induced Updated:  03/27/18 0739     Respiratory Culture Normal respiratory hnak     Gram Stain (Respiratory) <10 epithelial cells per low power field.     Gram Stain (Respiratory) Rare WBC's     Gram Stain (Respiratory) Rare  Gram positive cocci    Blood culture [[id number]] Collected:  03/21/18 0815    Order Status:  Completed Specimen:  Blood from Peripheral, Forearm, Left Updated:  03/26/18 1012     Blood Culture, Routine No growth after 5 days.    Urine culture [[id number]]     Order Status:  Canceled Specimen:  Urine         I have reviewed all pertinent labs within the past 24 hours.    Estimated Creatinine Clearance: 110.2 mL/min (based on SCr of 0.8 mg/dL).    Diagnostic Results:  I have reviewed all pertinent imaging results/findings within the past 24 hours.    Assessment and Plan:     * Acute respiratory failure with hypoxia    - S/P Bronch and intubation 3/14 now post tracheostomy due to inability to extubate   - Pulmonology following to help manage vent wean. Tolerating trach mask a few hours a day   - RSV positive early in hospitalization. Completed course of Ribavarin/IVIG. Per lung transplant protocol for severe RSV (given myelosuppression).   - ID following. Appreciate recs.  - Will finish 7 day course of meropenem/linezolid (last dose 4/2) as all cultures remain negative   - Will plan on repeat abdominal US to evaluate for ascites to allow for PEG placement. If not then we would consider TPN but I suspect incidence of infection would be similar for TPN vs. PEG with ascites.         Heart transplanted    - TTE 3/26/18 showed normal graft function  - Continue hydrocortisone 25mg BID for now  - Continue Tacro 1 mg bid (giving half dose sublingually until enteral access)   - Cellcept remains on hold        Type 1 diabetes mellitus with stage 3 chronic kidney disease    - Appreciate Endocrine's recs  - Insulin gtt        Hypothyroidism due to Hashimoto's thyroiditis    - Appreciate Endocrine's recs  - Continue IV levothyroxine 88mcg with plan to switch back to oral once enteral access is obtained        Restrictive cardiomyopathy    - TTE reviewed.   - S/P thoracentesis X 2, followed by VATS/pleurex cath placement (January  2018) with removal of pleurex (February 2018) and 3rd thoracentesis 2/14/18 with no significant findings on fluid removal.            Acute renal failure superimposed on stage 4 chronic kidney disease    - [facility] recs.   - Intermittent CRRT         Anemia    - Hgb stable at 7.0         Debility    - PT/OT daily             Mamadou rhodes, DO  Heart Transplant  [facility]

## 2018-03-31 NOTE — SUBJECTIVE & OBJECTIVE
Interval History: Less interactive this AM (didn't sleep well per night nurse). No acute events overnight.     Continuous Infusions:   sodium chloride 0.9%      insulin (HUMAN R) infusion (adults) 0.5 Units/hr (03/31/18 1600)    norepinephrine bitartrate-D5W       Scheduled Meds:   chlorhexidine  15 mL Mouth/Throat BID    famotidine (PF)  20 mg Intravenous Daily    heparin (porcine)  5,000 Units Subcutaneous Q12H    hydrocortisone sodium succinate  25 mg Intravenous BID    levothyroxine  88 mcg Intravenous Daily    linezolid 600mg/300ml  600 mg Intravenous Q12H    meropenem (MERREM) IVPB  2 g Intravenous Q12H    sodium chloride 0.9%  3 mL Intravenous Q8H    tacrolimus  0.5 mg Sublingual BID     PRN Meds:sodium chloride, dextrose 50%, dextrose 50%, fentaNYL, glucagon (human recombinant), glucose, glucose, insulin aspart U-100    Review of patient's allergies indicates:   Allergen Reactions    No known drug allergies      Objective:     Vital Signs (Most Recent):  Temp: 97.4 °F (36.3 °C) (03/31/18 1507)  Pulse: (!) 117 (03/31/18 1600)  Resp: 15 (03/31/18 1600)  BP: 134/65 (03/31/18 1400)  SpO2: 100 % (03/31/18 1600) Vital Signs (24h Range):  Temp:  [97.4 °F (36.3 °C)-98.4 °F (36.9 °C)] 97.4 °F (36.3 °C)  Pulse:  [116-127] 117  Resp:  [14-33] 15  SpO2:  [100 %] 100 %  BP: ()/(55-65) 134/65  Arterial Line BP: ()/(43-59) 127/58     Patient Vitals for the past 72 hrs (Last 3 readings):   Weight   03/31/18 0400 73.2 kg (161 lb 6 oz)   03/30/18 0300 73.3 kg (161 lb 9.6 oz)     Body mass index is 25.28 kg/m².      Intake/Output Summary (Last 24 hours) at 03/31/18 1704  Last data filed at 03/31/18 1600   Gross per 24 hour   Intake          2521.61 ml   Output             2472 ml   Net            49.61 ml     Telemetry: ST    Physical Exam   Constitutional:   Chronically ill    HENT:   Head: Normocephalic and atraumatic.   Eyes: Conjunctivae are normal. Pupils are equal, round, and reactive to light.    Neck: No JVD present. No thyromegaly present.   RIJ trialysis   Tracheostomy (C/D/I)   Cardiovascular: Regular rhythm.    Tachycardic   Pulmonary/Chest:   Coarse breath sounds throughout   Mostly clear    Abdominal: Soft. Bowel sounds are normal.   Musculoskeletal:   Trace - 1+ gen edema   Neurological:   Arouses to voice and nods head appropriately   Skin: Skin is warm and dry. Capillary refill takes less than 2 seconds. There is pallor.     Significant Labs:  CBC:    Recent Labs  Lab 03/30/18  0751 03/30/18 2122 03/31/18  0807   WBC 4.41 4.56 4.56   RBC 2.59* 2.45* 2.25*   HGB 7.8* 7.6* 7.0*   HCT 25.3* 24.6* 22.4*    175 140*   MCV 98 100* 100*   MCH 30.1 31.0 31.1*   MCHC 30.8* 30.9* 31.3*     CMP:    Recent Labs  Lab 03/29/18  0254  03/30/18  0217 03/30/18  1415 03/30/18 2122 03/31/18  0438   *  167*  < > 222* 111* 124* 146*  146*   CALCIUM 8.8  8.8  < > 8.5* 8.4* 8.2* 8.4*  8.4*   ALBUMIN 2.6*  2.6*  < > 2.6* 2.6* 2.5* 2.5*  2.5*   PROT 6.7  --  6.3  --   --  6.1     139  < > 137 139 141 140  140   K 5.4*  5.4*  < > 5.4* 4.6 4.6 4.8  4.8   CO2 19*  19*  < > 20* 25 26 26  26     106  < > 104 106 107 106  106   BUN 33*  33*  < > 15 16 6 6  6   CREATININE 2.0*  2.0*  < > 1.2 1.1 0.6 0.8  0.8   ALKPHOS 216*  --  223*  --   --  193*   ALT 26  --  18  --   --  17   AST 36  --  31  --   --  28   BILITOT 1.1*  --  1.3*  --   --  1.2*   < > = values in this interval not displayed.     Microbiology:  Microbiology Results (last 7 days)     Procedure Component Value Units Date/Time    Aerobic culture [[id number]] Collected:  03/28/18 1607    Order Status:  Completed Specimen:  Body Fluid from Lung, RLL Updated:  03/31/18 1209     Aerobic Bacterial Culture No growth    Blood culture [[id number]] Collected:  03/25/18 1554    Order Status:  Completed Specimen:  Blood from Peripheral, Upper Arm, Right Updated:  03/30/18 1812     Blood Culture, Routine No growth after 5 days.     Blood culture [[id number]] Collected:  03/25/18 1606    Order Status:  Completed Specimen:  Blood from Peripheral, Forearm, Left Updated:  03/30/18 1812     Blood Culture, Routine No growth after 5 days.    AFB Culture & Smear [[id number]] Collected:  03/28/18 1607    Order Status:  Completed Specimen:  Body Fluid from Lung, RLL Updated:  03/29/18 2127     AFB Culture & Smear Culture in progress     AFB CULTURE STAIN No acid fast bacilli seen.    Culture, Anaerobe [[id number]] Collected:  03/28/18 1607    Order Status:  Completed Specimen:  Body Fluid from Lung, RLL Updated:  03/29/18 1329     Anaerobic Culture Culture in progress    Gram stain [[id number]] Collected:  03/28/18 1607    Order Status:  Completed Specimen:  Body Fluid from Lung, RLL Updated:  03/28/18 1654     Gram Stain Result Rare WBC's      No organisms seen    Fungus culture [[id number]] Collected:  03/28/18 1607    Order Status:  Sent Specimen:  Body Fluid from Lung, RLL Updated:  03/28/18 1617    Fungus culture [[id number]] Collected:  03/14/18 1137    Order Status:  Completed Specimen:  Bronchial Wash from Amniotic Fluid Updated:  03/28/18 0933     Fungus (Mycology) Culture Culture in progress     Fungus (Mycology) Culture No fungus isolated after 2 weeks    Culture, Respiratory with Gram Stain [[id number]] Collected:  03/25/18 1534    Order Status:  Completed Specimen:  Respiratory from Sputum, Induced Updated:  03/27/18 0739     Respiratory Culture Normal respiratory hank     Gram Stain (Respiratory) <10 epithelial cells per low power field.     Gram Stain (Respiratory) Rare WBC's     Gram Stain (Respiratory) Rare Gram positive cocci    Blood culture [[id number]] Collected:  03/21/18 0815    Order Status:  Completed Specimen:  Blood from Peripheral, Forearm, Left Updated:  03/26/18 1012     Blood Culture, Routine No growth after 5 days.    Urine culture [[id number]]     Order Status:  Canceled Specimen:  Urine         I have reviewed all pertinent  labs within the past 24 hours.    Estimated Creatinine Clearance: 110.2 mL/min (based on SCr of 0.8 mg/dL).    Diagnostic Results:  I have reviewed all pertinent imaging results/findings within the past 24 hours.

## 2018-03-31 NOTE — ASSESSMENT & PLAN NOTE
- baseline sCr 2.6-3.0 consistent with CKD stage IV  - anuric CACHORRO; likely ischemic ATN from prolonged pre-renal state (diarrhea) in setting of prograf renal vasoconstriction; cannot r/o septic ATN or Prograf toxicity  - SLED started 3/14  - remains anuric  - started having intermittent levophed requirements. Received SLED x 12 hours last night with decreased UF at 300cc/hr; tolerated well until last 2 hours of treatment when levophed was restarted  - will plan for SLED x 12 hours again tonight for clearance and to match I/O. UF goal 250-300cc/hr. If BP drops on SLED, recommend dropping UF to 200cc/hr instead of starting levophed, if possible.

## 2018-03-31 NOTE — SUBJECTIVE & OBJECTIVE
"Interval HPI:   Overnight events: Currently no source of enteral nutrition. No definite plan for TPN as of now.  Eating:   NPO  Nausea: No  Hypoglycemia and intervention: No  Fever: No  TPN and/or TF: No    BP (!) 94/55 (BP Location: Right arm, Patient Position: Lying)   Pulse (!) 123   Temp 98.4 °F (36.9 °C) (Axillary)   Resp (!) 21   Ht 5' 7" (1.702 m)   Wt 73.2 kg (161 lb 6 oz)   SpO2 100%   BMI 25.28 kg/m²     Labs Reviewed and Include      Recent Labs  Lab 03/31/18  0438   *  146*   CALCIUM 8.4*  8.4*   ALBUMIN 2.5*  2.5*   PROT 6.1     140   K 4.8  4.8   CO2 26  26     106   BUN 6  6   CREATININE 0.8  0.8   ALKPHOS 193*   ALT 17   AST 28   BILITOT 1.2*     Lab Results   Component Value Date    WBC 4.56 03/31/2018    HGB 7.0 (L) 03/31/2018    HCT 22.4 (L) 03/31/2018     (H) 03/31/2018     (L) 03/31/2018       Recent Labs  Lab 03/30/18  0217   TSH 9.412*   FREET4 0.81     Lab Results   Component Value Date    HGBA1C 6.9 (H) 02/11/2018       Nutritional status:   Body mass index is 25.28 kg/m².  Lab Results   Component Value Date    ALBUMIN 2.5 (L) 03/31/2018    ALBUMIN 2.5 (L) 03/31/2018    ALBUMIN 2.5 (L) 03/30/2018     Lab Results   Component Value Date    PREALBUMIN 5 (L) 03/15/2018    PREALBUMIN 18 (L) 03/24/2015    PREALBUMIN 19 (L) 12/17/2014       Estimated Creatinine Clearance: 110.2 mL/min (based on SCr of 0.8 mg/dL).    Accu-Checks  Recent Labs      03/30/18   0758  03/30/18   0910  03/30/18   1004  03/30/18   1208  03/30/18   1612  03/30/18   1647  03/30/18   1734  03/30/18   1952  03/30/18   2340  03/31/18   0507   POCTGLUCOSE  229*  165*  148*  146*  86  81  150*  135*  141*  107       Current Medications and/or Treatments Impacting Glycemic Control  Immunotherapy:  Immunosuppressants         Stop Route Frequency     tacrolimus capsule 0.5 mg      -- SL 2 times daily        Steroids:   Hormones     Start     Stop Route Frequency Ordered    03/31/18 " 0900  hydrocortisone sodium succinate injection 25 mg      -- IV 2 times daily 03/30/18 1711        Pressors:    Autonomic Drugs     Start     Stop Route Frequency Ordered    03/29/18 1045  norepinephrine 4 mg in dextrose 5% 250 mL infusion (premix) (titrating)     Question Answer Comment   Titrate by: (in mcg/kg/min) 0.02    Titrate interval: (in minutes) 15    Titrate to maintain: (MAP or SBP) MAP    Greater than: (in mmHg) 65    Maximum dose: (in mcg/kg/min) 2        -- IV Continuous 03/29/18 0945        Hyperglycemia/Diabetes Medications: Antihyperglycemics     Start     Stop Route Frequency Ordered    03/30/18 1030  insulin regular (Humulin R) 100 Units in sodium chloride 0.9% 100 mL infusion      -- IV Continuous 03/30/18 0927 03/30/18 1027  insulin aspart U-100 pen 0-5 Units      -- SubQ As needed (PRN) 03/30/18 0927

## 2018-03-31 NOTE — PROGRESS NOTES
[facility]  Nephrology  Progress Note    Patient Name: Lv Crocker  MRN: [id number]  Admission Date: 3/11/2018  Hospital Length of Stay: 19 days  Attending Provider: Heriberto Rosa MD   Primary Care Physician: Philippe Mohr MD  Principal Problem:Acute respiratory failure with hypoxia    Subjective:     HPI: Mr. Crocker is a 43 yo WM with T1DM, Hashimoto thyroiditis, h/o OHTx 11/2014, and CKD stage 4 who was admitted on 3/11/18 for persistent diarrhea. He reported a 3 week history of diarrhea up to 15x/day. Associated symptoms including URI symptoms, coughing fits, and coughing syncope. Prograf level was 14.3. His post-heart transplant course has been complicated by AMR 4/2015, CMV, post-transplant restrictive cardiomyopathy, recurrent pleural effusions/ascites requiring monthly thoracenteses/paracenteses, VATS 1/11/18 with Pleur-X catheter (now removed), and CKD stage 4 with baseline sCr 2.6-3.0. Baseline -120s and baseline BP 90s-110s/60-70s. Upon admission he was started on IVF and diarrhea workup was initiated. On 3/12 he was noted to have decreased UOP despite fluids; NS was increased to 100cc/hr. He was scheduled for a colonoscopy on 3/13 however procedure was cancelled due to hypoxia and fever. He was transferred to the ICU for closer monitoring. He continued to have oliguria overnight. Bladder scan revealed 200cc of urine but patient refused osullivan catheter placement. Wife reports that patient always has a hard time urinating again after osullivan catheters are placed/removed so he refuses them. He remained hypoxic despite FiO2 70% and ABG revealed combined respiratory and metabolic acidosis with pH 7.17. He was started on BiPAP as well as a bicarb infusion at 50cc/hr. ABG with mild improvement. AM labs revealed K 6.2 and he was shifted and given kayexalate.Trialysis catheter was placed this morning and he subsequently developed hypotension and was started on levophed. He  was intubated later this morning. Nephrology consulted for CACHORRO. All history obtained by primary team and chart review as patient was intubated/sedated on exam. Consent for dialysis obtained by patient's wife and placed in chart. Of note, both of patient's parents were on dialysis.     Interval History:   Off levophed yesterday. Received SLED from 3p-3a with UF 300cc/hr. Levophed restarted around 1am. Otherwise no events overnight. Net even volume status. Minimal vent requirements. Hourly intake 12cc/hr with levophed and insulin.     Review of patient's allergies indicates:   Allergen Reactions    No known drug allergies      Current Facility-Administered Medications   Medication Frequency    0.9%  NaCl infusion (CRRT USE ONLY) Continuous    0.9%  NaCl infusion (for blood administration) Q24H PRN    chlorhexidine 0.12 % solution 15 mL BID    dextrose 50% injection 12.5 g PRN    dextrose 50% injection 25 g PRN    famotidine (PF) injection 20 mg Daily    fentaNYL injection 50 mcg Q1H PRN    glucagon (human recombinant) injection 1 mg PRN    glucose chewable tablet 16 g PRN    glucose chewable tablet 24 g PRN    heparin (porcine) injection 5,000 Units Q12H    hydrocortisone sodium succinate injection 25 mg BID    insulin aspart U-100 pen 0-5 Units PRN    insulin regular (Humulin R) 100 Units in sodium chloride 0.9% 100 mL infusion Continuous    levothyroxine injection 88 mcg Daily    linezolid 600mg/300ml IVPB 600 mg Q12H    meropenem (MERREM) 2 g in sodium chloride 0.9% 100 mL IVPB Q12H    norepinephrine 4 mg in dextrose 5% 250 mL infusion (premix) (titrating) Continuous    sodium chloride 0.9% flush 3 mL Q8H    tacrolimus capsule 0.5 mg BID       Objective:     Vital Signs (Most Recent):  Temp: 98.4 °F (36.9 °C) (03/31/18 0730)  Pulse: (!) 123 (03/31/18 1033)  Resp: (!) 24 (03/31/18 1033)  BP: (!) 94/55 (03/30/18 1800)  SpO2: 100 % (03/31/18 1033)  O2 Device (Oxygen Therapy): tracheostomy collar  (03/31/18 1033) Vital Signs (24h Range):  Temp:  [96.9 °F (36.1 °C)-98.4 °F (36.9 °C)] 98.4 °F (36.9 °C)  Pulse:  [115-127] 123  Resp:  [8-33] 24  SpO2:  [100 %] 100 %  BP: ()/(55-64) 94/55  Arterial Line BP: ()/(43-59) 117/56     Weight: 73.2 kg (161 lb 6 oz) (03/31/18 0400)  Body mass index is 25.28 kg/m².  Body surface area is 1.86 meters squared.    I/O last 3 completed shifts:  In: 5042.1 [I.V.:3842.1; IV Piggyback:1200]  Out: 4733 [Other:4733]    Physical Exam   Constitutional: He appears well-developed and well-nourished.   HENT:   Head: Normocephalic and atraumatic.   Eyes: Conjunctivae and EOM are normal.   Cardiovascular: Regular rhythm.  Tachycardia present.    Pulmonary/Chest: He has no wheezes. He has no rales.   Abdominal: Soft. He exhibits no distension.   Musculoskeletal: He exhibits no edema or deformity.   Skin: Skin is warm and dry. He is not diaphoretic.       Significant Labs:  ABGs:   Recent Labs  Lab 03/31/18  0513   PH 7.319*   PCO2 51.0*   HCO3 26.2   POCSATURATED 65*   BE 0     CBC:   Recent Labs  Lab 03/31/18  0807   WBC 4.56   RBC 2.25*   HGB 7.0*   HCT 22.4*   *   *   MCH 31.1*   MCHC 31.3*     CMP:   Recent Labs  Lab 03/31/18  0438   *  146*   CALCIUM 8.4*  8.4*   ALBUMIN 2.5*  2.5*   PROT 6.1     140   K 4.8  4.8   CO2 26  26     106   BUN 6  6   CREATININE 0.8  0.8   ALKPHOS 193*   ALT 17   AST 28   BILITOT 1.2*     All labs within the past 24 hours have been reviewed.     Significant Imaging:  Labs: Reviewed    Assessment/Plan:     Acute renal failure superimposed on stage 4 chronic kidney disease    - baseline sCr 2.6-3.0 consistent with CKD stage IV  - anuric CACHORRO; likely ischemic ATN from prolonged pre-renal state (diarrhea) in setting of prograf renal vasoconstriction; cannot r/o septic ATN or Prograf toxicity  - SLED started 3/14  - remains anuric  - started having intermittent levophed requirements. Received SLED x 12 hours  last night with decreased UF at 300cc/hr; tolerated well until last 2 hours of treatment when levophed was restarted  - will plan for SLED x 12 hours again tonight for clearance and to match I/O. UF goal 250-300cc/hr. If BP drops on SLED, recommend dropping UF to 200cc/hr instead of starting levophed, if possible.             Amairani Alegria, PGY-5  Nephrology Fellow  [facility]  Pager: [phone]

## 2018-03-31 NOTE — ASSESSMENT & PLAN NOTE
Intermittently requiring pressors.     Empirically being treated with abx for presumed septic shock.

## 2018-03-31 NOTE — PLAN OF CARE
Problem: Patient Care Overview  Goal: Plan of Care Review  Outcome: Ongoing (interventions implemented as appropriate)  No acute events throughout day. See vital signs and assessments in flowsheets. See below for updates on today's progress.     Pulmonary: Remains on ventilator with trach with oxygen saturations > 98%    Cardiovascular: -120s. MAP > 65 maintained with levophed being restarted at 0100. CVP 4-5. SvO2 65.    Neurological: Awake, follows commands, and smiles occasionally. Did not sleep during shift.     Gastrointestinal: BM x1.     Genitourinary: Anuric, CRRT 12 hr run completed during shift - clotted off x1 during shift.     Endocrine: Accuchecks completed as ordered.    Integumentary/Other: Intact. Turned q2h, bathed, on fresh linens.     Infusions: Insulin, Levophed     Patient progressing towards goals as tolerated, plan of care communicated and reviewed with Lv Crocker and family. All concerns addressed. Will continue to monitor.

## 2018-03-31 NOTE — NURSING
CRRT with clot in venous chamber. Only arterial side able to be rinsed back to patient. [facility] RN made aware and at bedside to restart.

## 2018-03-31 NOTE — ASSESSMENT & PLAN NOTE
- S/P Bronch and intubation 3/14 now post tracheostomy due to inability to extubate   - Pulmonology following to help manage vent wean. Tolerating trach mask a few hours a day   - RSV positive early in hospitalization. Completed course of Ribavarin/IVIG. Per lung transplant protocol for severe RSV (given myelosuppression).   - ID following. Appreciate recs.  - Will finish 7 day course of meropenem/linezolid (last dose 4/2) as all cultures remain negative   - Will plan on repeat abdominal US to evaluate for ascites to allow for PEG placement. If not then we would consider TPN but I suspect incidence of infection would be similar for TPN vs. PEG with ascites.

## 2018-03-31 NOTE — SUBJECTIVE & OBJECTIVE
Interval History:   Off levophed yesterday. Received SLED from 3p-3a with UF 300cc/hr. Levophed restarted around 1am. Otherwise no events overnight. Net even volume status. Minimal vent requirements. Hourly intake 12cc/hr with levophed and insulin.     Review of patient's allergies indicates:   Allergen Reactions    No known drug allergies      Current Facility-Administered Medications   Medication Frequency    0.9%  NaCl infusion (CRRT USE ONLY) Continuous    0.9%  NaCl infusion (for blood administration) Q24H PRN    chlorhexidine 0.12 % solution 15 mL BID    dextrose 50% injection 12.5 g PRN    dextrose 50% injection 25 g PRN    famotidine (PF) injection 20 mg Daily    fentaNYL injection 50 mcg Q1H PRN    glucagon (human recombinant) injection 1 mg PRN    glucose chewable tablet 16 g PRN    glucose chewable tablet 24 g PRN    heparin (porcine) injection 5,000 Units Q12H    hydrocortisone sodium succinate injection 25 mg BID    insulin aspart U-100 pen 0-5 Units PRN    insulin regular (Humulin R) 100 Units in sodium chloride 0.9% 100 mL infusion Continuous    levothyroxine injection 88 mcg Daily    linezolid 600mg/300ml IVPB 600 mg Q12H    meropenem (MERREM) 2 g in sodium chloride 0.9% 100 mL IVPB Q12H    norepinephrine 4 mg in dextrose 5% 250 mL infusion (premix) (titrating) Continuous    sodium chloride 0.9% flush 3 mL Q8H    tacrolimus capsule 0.5 mg BID       Objective:     Vital Signs (Most Recent):  Temp: 98.4 °F (36.9 °C) (03/31/18 0730)  Pulse: (!) 123 (03/31/18 1033)  Resp: (!) 24 (03/31/18 1033)  BP: (!) 94/55 (03/30/18 1800)  SpO2: 100 % (03/31/18 1033)  O2 Device (Oxygen Therapy): tracheostomy collar (03/31/18 1033) Vital Signs (24h Range):  Temp:  [96.9 °F (36.1 °C)-98.4 °F (36.9 °C)] 98.4 °F (36.9 °C)  Pulse:  [115-127] 123  Resp:  [8-33] 24  SpO2:  [100 %] 100 %  BP: ()/(55-64) 94/55  Arterial Line BP: ()/(43-59) 117/56     Weight: 73.2 kg (161 lb 6 oz) (03/31/18  0400)  Body mass index is 25.28 kg/m².  Body surface area is 1.86 meters squared.    I/O last 3 completed shifts:  In: 5042.1 [I.V.:3842.1; IV Piggyback:1200]  Out: 4733 [Other:4733]    Physical Exam   Constitutional: He appears well-developed and well-nourished.   HENT:   Head: Normocephalic and atraumatic.   Eyes: Conjunctivae and EOM are normal.   Cardiovascular: Regular rhythm.  Tachycardia present.    Pulmonary/Chest: He has no wheezes. He has no rales.   Abdominal: Soft. He exhibits no distension.   Musculoskeletal: He exhibits no edema or deformity.   Skin: Skin is warm and dry. He is not diaphoretic.       Significant Labs:  ABGs:   Recent Labs  Lab 03/31/18  0513   PH 7.319*   PCO2 51.0*   HCO3 26.2   POCSATURATED 65*   BE 0     CBC:   Recent Labs  Lab 03/31/18  0807   WBC 4.56   RBC 2.25*   HGB 7.0*   HCT 22.4*   *   *   MCH 31.1*   MCHC 31.3*     CMP:   Recent Labs  Lab 03/31/18  0438   *  146*   CALCIUM 8.4*  8.4*   ALBUMIN 2.5*  2.5*   PROT 6.1     140   K 4.8  4.8   CO2 26  26     106   BUN 6  6   CREATININE 0.8  0.8   ALKPHOS 193*   ALT 17   AST 28   BILITOT 1.2*     All labs within the past 24 hours have been reviewed.     Significant Imaging:  Labs: Reviewed

## 2018-03-31 NOTE — SUBJECTIVE & OBJECTIVE
Interval History:     41 y/o Male with h/o Heart transplant, restrictive cardiomyopathy who initially presented to hospital for diarrhea. Over prolonged hospital stay, he developed respiratory failure (suspected secondary to RSV infection), weakness eventually requiring tracheostomy. His hospital course has also been complicated by renal failure now requiring SLED.     We are following patient for tracheostomy management.     This AM, awake, comfortable on 5/5 trials on 30% Fio2.     Objective:     Vital Signs (Most Recent):  Temp: 98.4 °F (36.9 °C) (03/31/18 1115)  Pulse: (!) 119 (03/31/18 1300)  Resp: (!) 21 (03/31/18 1300)  BP: 127/64 (03/31/18 1300)  SpO2: 100 % (03/31/18 1300) Vital Signs (24h Range):  Temp:  [96.9 °F (36.1 °C)-98.4 °F (36.9 °C)] 98.4 °F (36.9 °C)  Pulse:  [115-127] 119  Resp:  [12-33] 21  SpO2:  [100 %] 100 %  BP: ()/(55-64) 127/64  Arterial Line BP: ()/(43-59) 121/56     Weight: 73.2 kg (161 lb 6 oz)  Body mass index is 25.28 kg/m².      Intake/Output Summary (Last 24 hours) at 03/31/18 1343  Last data filed at 03/31/18 1300   Gross per 24 hour   Intake          3624.71 ml   Output             3680 ml   Net           -55.29 ml       Physical Exam     Gen: weak, fatigued appearing patient. On ventilator. Moves head in response to verbal questions.   HEENT: trach in place. Right IJ trialysis cath in place.   Chest: bilateral breath sounds noted.    Heart: Regular tachycardia.    Abdomen: soft, non tender, no masses. No g/r/r  Extremities: pitting edema. No cyanosis. No deformities.       Vents:  Vent Mode: CPAP (03/31/18 0902)  Ventilator Initiated: Yes (03/14/18 0932)  Set Rate: 18 bmp (03/29/18 0722)  Vt Set: 410 mL (03/29/18 0722)  Pressure Support: 5 cmH20 (03/31/18 0902)  PEEP/CPAP: 5 cmH20 (03/31/18 0902)  Oxygen Concentration (%): 35 (03/31/18 1300)  Peak Airway Pressure: 10 cmH2O (03/31/18 0902)  Plateau Pressure: 20 cmH20 (03/29/18 0313)  Total Ve: 7.89 mL (03/29/18  1714)  F/VT Ratio<105 (RSBI): (!) 67.09 (03/31/18 0902)    Lines/Drains/Airways     Central Venous Catheter Line                 Trialysis (Dialysis) Catheter 03/23/18 1433 right internal jugular 7 days          Airway                 Surgical Airway 03/28/18 1625 Shiley 2 days          Arterial Line                 Arterial Line 03/14/18 1600 Right Femoral 16 days          Peripheral Intravenous Line                 Midline Catheter Insertion/Assessment  - Single Lumen 03/17/18 1454 Left cephalic vein (lateral side of arm) 18g x 8cm 13 days         Peripheral IV - Single Lumen 03/20/18 1640 Right Forearm 10 days                Significant Labs:    CBC/Anemia Profile:    Recent Labs  Lab 03/30/18  0751 03/30/18 2122 03/31/18  0807   WBC 4.41 4.56 4.56   HGB 7.8* 7.6* 7.0*   HCT 25.3* 24.6* 22.4*    175 140*   MCV 98 100* 100*   RDW 19.1* 18.7* 18.6*        Chemistries:    Recent Labs  Lab 03/30/18  0217  03/30/18  1415 03/30/18 2122 03/31/18  0438     --  139 141 140  140   K 5.4*  --  4.6 4.6 4.8  4.8     --  106 107 106  106   CO2 20*  --  25 26 26  26   BUN 15  --  16 6 6  6   CREATININE 1.2  --  1.1 0.6 0.8  0.8   CALCIUM 8.5*  --  8.4* 8.2* 8.4*  8.4*   ALBUMIN 2.6*  --  2.6* 2.5* 2.5*  2.5*   PROT 6.3  --   --   --  6.1   BILITOT 1.3*  --   --   --  1.2*   ALKPHOS 223*  --   --   --  193*   ALT 18  --   --   --  17   AST 31  --   --   --  28   MG  --   < > 1.9 1.7 1.8   PHOS  --   --  3.2 1.7* 2.1*   < > = values in this interval not displayed.    All pertinent labs within the past 24 hours have been reviewed.    Significant Imaging:  I have reviewed and interpreted all pertinent imaging results/findings within the past 24 hours.

## 2018-03-31 NOTE — PROGRESS NOTES
"[facility]  Endocrinology  Progress Note    Admit Date: 3/11/2018     Reason for Consult: abnormal TFTs    Surgical Procedure and Date: s/p heart transplant 2014     Diabetes diagnosis year: 5yo (T1DM)     Home Diabetes Medications:    Levemir 15u qHS  Novolog 4u AC     How often checking glucose at home? 4 times daily   BG readings on regimen: 150-200s  Hypoglycemia on the regimen?  Yes, rarely - treats appropriately (light snack, glucose tab)  Missed doses on regimen?  No        Diabetes Complications include:     Hyperglycemia, Hypoglycemia  and Diabetic chronic kidney disease          Complicating diabetes co morbidities:   N/A     HPI:   Patient is a 42 y.o. male with a diagnosis of T1DM, HTN, hypothyroidism, s/p heart transplant.  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 He required serial paracentesis and thoracentesis until he underwent a VATS with pleurX catheter placement on 1/11/2018 and removed 2/7/18.       Presented to hospital secondary to diarrhea and cough.  Upon initial labs, TSH was decreased (0.101) and free T4 1.33.  Endocrinology consulted to assist in management of these labs.  He was last seen in clinic by Kamala Vázquez NP 11/2017.   Previous hospitalization, endocrine consulted for same problem.  During that visit, recommended decreasing LT4 to 125mcg daily. Unfortunately, patient was discharged on previous dose of 150mcg daily.     Interval HPI:   Overnight events: Currently no source of enteral nutrition. No definite plan for TPN as of now.  Eating:   NPO  Nausea: No  Hypoglycemia and intervention: No  Fever: No  TPN and/or TF: No    BP (!) 94/55 (BP Location: Right arm, Patient Position: Lying)   Pulse (!) 123   Temp 98.4 °F (36.9 °C) (Axillary)   Resp (!) 21   Ht 5' 7" (1.702 m)   Wt 73.2 kg (161 lb 6 oz)   SpO2 100%   BMI 25.28 kg/m²       Labs " Reviewed and Include      Recent Labs  Lab 03/31/18  0438   *  146*   CALCIUM 8.4*  8.4*   ALBUMIN 2.5*  2.5*   PROT 6.1     140   K 4.8  4.8   CO2 26  26     106   BUN 6  6   CREATININE 0.8  0.8   ALKPHOS 193*   ALT 17   AST 28   BILITOT 1.2*     Lab Results   Component Value Date    WBC 4.56 03/31/2018    HGB 7.0 (L) 03/31/2018    HCT 22.4 (L) 03/31/2018     (H) 03/31/2018     (L) 03/31/2018       Recent Labs  Lab 03/30/18  0217   TSH 9.412*   FREET4 0.81     Lab Results   Component Value Date    HGBA1C 6.9 (H) 02/11/2018       Nutritional status:   Body mass index is 25.28 kg/m².  Lab Results   Component Value Date    ALBUMIN 2.5 (L) 03/31/2018    ALBUMIN 2.5 (L) 03/31/2018    ALBUMIN 2.5 (L) 03/30/2018     Lab Results   Component Value Date    PREALBUMIN 5 (L) 03/15/2018    PREALBUMIN 18 (L) 03/24/2015    PREALBUMIN 19 (L) 12/17/2014       Estimated Creatinine Clearance: 110.2 mL/min (based on SCr of 0.8 mg/dL).    Accu-Checks  Recent Labs      03/30/18   0758  03/30/18   0910  03/30/18   1004  03/30/18   1208  03/30/18   1612  03/30/18   1647  03/30/18   1734  03/30/18   1952  03/30/18   2340  03/31/18   0507   POCTGLUCOSE  229*  165*  148*  146*  86  81  150*  135*  141*  107       Current Medications and/or Treatments Impacting Glycemic Control  Immunotherapy:  Immunosuppressants         Stop Route Frequency     tacrolimus capsule 0.5 mg      -- SL 2 times daily        Steroids:   Hormones     Start     Stop Route Frequency Ordered    03/31/18 0900  hydrocortisone sodium succinate injection 25 mg      -- IV 2 times daily 03/30/18 1711        Pressors:    Autonomic Drugs     Start     Stop Route Frequency Ordered    03/29/18 1045  norepinephrine 4 mg in dextrose 5% 250 mL infusion (premix) (titrating)     Question Answer Comment   Titrate by: (in mcg/kg/min) 0.02    Titrate interval: (in minutes) 15    Titrate to maintain: (MAP or SBP) MAP    Greater than: (in mmHg) 65     Maximum dose: (in mcg/kg/min) 2        -- IV Continuous 03/29/18 0945        Hyperglycemia/Diabetes Medications: Antihyperglycemics     Start     Stop Route Frequency Ordered    03/30/18 1030  insulin regular (Humulin R) 100 Units in sodium chloride 0.9% 100 mL infusion      -- IV Continuous 03/30/18 0927    03/30/18 1027  insulin aspart U-100 pen 0-5 Units      -- SubQ As needed (PRN) 03/30/18 0927          ASSESSMENT and PLAN    * Acute respiratory failure with hypoxia    Per primary    S/p trach        On enteral nutrition    No gastric access currently. TF on hold.        Type 1 diabetes mellitus with stage 3 chronic kidney disease    D/C intensive gtt.    Continue transition at 0.5 units/hr.  POC q4  Low dose correction    Notify endocrine for changes in nutrition status (diet, TF, TPN)    BG goal 140-180  Lab Results   Component Value Date    HGBA1C 6.9 (H) 02/11/2018     insulin gtt cannot be discontinued as he is a type 1 diabetic, discussed with nursing.    Discharge Recommendations:  TBD.        Hypothyroidism due to Hashimoto's thyroiditis    Abnormal TFTs upon admit.  Unclear if secondary to illness, but with evidence of iatrogenic hyperthyroidism in past while on above weight based dose.     TFTs indicate too little thyroid hormone. Possibly from missed doses from lack of PO access vs. Bowel edema with malabsorption.     Continue IV LT4   88 mcg daily.  Recheck TFTs in 4 weeks.          Acute renal failure superimposed on stage 4 chronic kidney disease    Avoid insulin stacking          Heart transplanted    Per transplant  Avoid hypoglycemia              Corey Johnson MD  Endocrinology  [facility]

## 2018-03-31 NOTE — ASSESSMENT & PLAN NOTE
Tolerating trach collar this AM, continue trach collar trials during day and on vent at night time.   Continue to have PT work with him.

## 2018-03-31 NOTE — PROGRESS NOTES
[facility]  Pulmonology  Progress Note    Patient Name: Lv Crocker  MRN: [id number]  Admission Date: 3/11/2018  Hospital Length of Stay: 19 days  Code Status: Full Code  Attending Provider: Heriberto Rosa MD  Primary Care Provider: Philippe Mohr MD   Principal Problem: Acute respiratory failure with hypoxia    Subjective:     Interval History:     43 y/o Male with h/o Heart transplant, restrictive cardiomyopathy who initially presented to hospital for diarrhea. Over prolonged hospital stay, he developed respiratory failure (suspected secondary to RSV infection), weakness eventually requiring tracheostomy. His hospital course has also been complicated by renal failure now requiring SLED.     We are following patient for tracheostomy management.     This AM, awake, comfortable on 5/5 trials on 30% Fio2.     Objective:     Vital Signs (Most Recent):  Temp: 98.4 °F (36.9 °C) (03/31/18 1115)  Pulse: (!) 119 (03/31/18 1300)  Resp: (!) 21 (03/31/18 1300)  BP: 127/64 (03/31/18 1300)  SpO2: 100 % (03/31/18 1300) Vital Signs (24h Range):  Temp:  [96.9 °F (36.1 °C)-98.4 °F (36.9 °C)] 98.4 °F (36.9 °C)  Pulse:  [115-127] 119  Resp:  [12-33] 21  SpO2:  [100 %] 100 %  BP: ()/(55-64) 127/64  Arterial Line BP: ()/(43-59) 121/56     Weight: 73.2 kg (161 lb 6 oz)  Body mass index is 25.28 kg/m².      Intake/Output Summary (Last 24 hours) at 03/31/18 1343  Last data filed at 03/31/18 1300   Gross per 24 hour   Intake          3624.71 ml   Output             3680 ml   Net           -55.29 ml       Physical Exam     Gen: weak, fatigued appearing patient. On ventilator. Moves head in response to verbal questions.   HEENT: trach in place. Right IJ trialysis cath in place.   Chest: bilateral breath sounds noted.    Heart: Regular tachycardia.    Abdomen: soft, non tender, no masses. No g/r/r  Extremities: pitting edema. No cyanosis. No deformities.       Vents:  Vent Mode: CPAP (03/31/18  0902)  Ventilator Initiated: Yes (03/14/18 0932)  Set Rate: 18 bmp (03/29/18 0722)  Vt Set: 410 mL (03/29/18 0722)  Pressure Support: 5 cmH20 (03/31/18 0902)  PEEP/CPAP: 5 cmH20 (03/31/18 0902)  Oxygen Concentration (%): 35 (03/31/18 1300)  Peak Airway Pressure: 10 cmH2O (03/31/18 0902)  Plateau Pressure: 20 cmH20 (03/29/18 0313)  Total Ve: 7.89 mL (03/29/18 1714)  F/VT Ratio<105 (RSBI): (!) 67.09 (03/31/18 0902)    Lines/Drains/Airways     Central Venous Catheter Line                 Trialysis (Dialysis) Catheter 03/23/18 1433 right internal jugular 7 days          Airway                 Surgical Airway 03/28/18 1625 Shiley 2 days          Arterial Line                 Arterial Line 03/14/18 1600 Right Femoral 16 days          Peripheral Intravenous Line                 Midline Catheter Insertion/Assessment  - Single Lumen 03/17/18 1454 Left cephalic vein (lateral side of arm) 18g x 8cm 13 days         Peripheral IV - Single Lumen 03/20/18 1640 Right Forearm 10 days                Significant Labs:    CBC/Anemia Profile:    Recent Labs  Lab 03/30/18  0751 03/30/18 2122 03/31/18  0807   WBC 4.41 4.56 4.56   HGB 7.8* 7.6* 7.0*   HCT 25.3* 24.6* 22.4*    175 140*   MCV 98 100* 100*   RDW 19.1* 18.7* 18.6*        Chemistries:    Recent Labs  Lab 03/30/18  0217  03/30/18  1415 03/30/18 2122 03/31/18  0438     --  139 141 140  140   K 5.4*  --  4.6 4.6 4.8  4.8     --  106 107 106  106   CO2 20*  --  25 26 26  26   BUN 15  --  16 6 6  6   CREATININE 1.2  --  1.1 0.6 0.8  0.8   CALCIUM 8.5*  --  8.4* 8.2* 8.4*  8.4*   ALBUMIN 2.6*  --  2.6* 2.5* 2.5*  2.5*   PROT 6.3  --   --   --  6.1   BILITOT 1.3*  --   --   --  1.2*   ALKPHOS 223*  --   --   --  193*   ALT 18  --   --   --  17   AST 31  --   --   --  28   MG  --   < > 1.9 1.7 1.8   PHOS  --   --  3.2 1.7* 2.1*   < > = values in this interval not displayed.    All pertinent labs within the past 24 hours have been reviewed.    Significant  Imaging:  I have reviewed and interpreted all pertinent imaging results/findings within the past 24 hours.    Assessment/Plan:     * Acute respiratory failure with hypoxia    Tolerating trach collar this AM, continue trach collar trials during day and on vent at night time.   Continue to have PT work with him.         Shock, unspecified    Intermittently requiring pressors.     Empirically being treated with abx for presumed septic shock.                  Sukhjinder Rich,   Pulmonology  [facility]

## 2018-04-01 NOTE — ASSESSMENT & PLAN NOTE
- Has known history of recurrent ascites and pleural effusions   - S/P thoracentesis X 2, followed by VATS/pleurex cath placement (January 2018) with removal of pleurex (February 2018) and 3rd thoracentesis 2/14/18 with no significant findings on fluid removal.

## 2018-04-01 NOTE — SUBJECTIVE & OBJECTIVE
Interval History: Remains afebrile and off levophed over the past 24 hours. Hgb low this AM requiring PRBC x1. Interactive with exam today. Another discussion with wife regarding NG/TPN/PEG. Tolerated trach collar most of the day yesterday and did well with only pressure support on vent overnight.     Continuous Infusions:   sodium chloride 0.9%      insulin (HUMAN R) infusion (adults) 0.5 Units/hr (04/01/18 1100)     Scheduled Meds:   chlorhexidine  15 mL Mouth/Throat BID    famotidine (PF)  20 mg Intravenous Daily    heparin (porcine)  5,000 Units Subcutaneous Q12H    hydrocortisone sodium succinate  25 mg Intravenous BID    levothyroxine  88 mcg Intravenous Daily    linezolid 600mg/300ml  600 mg Intravenous Q12H    meropenem (MERREM) IVPB  2 g Intravenous Q12H    sodium chloride 0.9%  3 mL Intravenous Q8H    tacrolimus  0.5 mg Sublingual BID     PRN Meds:sodium chloride, dextrose 50%, dextrose 50%, fentaNYL, glucagon (human recombinant), glucose, glucose, insulin aspart U-100    Review of patient's allergies indicates:   Allergen Reactions    No known drug allergies      Objective:     Vital Signs (Most Recent):  Temp: 97.1 °F (36.2 °C) (04/01/18 1105)  Pulse: 109 (04/01/18 1238)  Resp: 16 (04/01/18 1238)  BP: 134/65 (03/31/18 1400)  SpO2: 100 % (04/01/18 1238) Vital Signs (24h Range):  Temp:  [97.1 °F (36.2 °C)-98.2 °F (36.8 °C)] 97.1 °F (36.2 °C)  Pulse:  [107-265] 109  Resp:  [7-27] 16  SpO2:  [100 %] 100 %  BP: (134)/(65) 134/65  Arterial Line BP: ()/(42-59) 111/52     Patient Vitals for the past 72 hrs (Last 3 readings):   Weight   04/01/18 0313 73.2 kg (161 lb 6 oz)   03/31/18 0400 73.2 kg (161 lb 6 oz)   03/30/18 0300 73.3 kg (161 lb 9.6 oz)     Body mass index is 25.28 kg/m².      Intake/Output Summary (Last 24 hours) at 04/01/18 1334  Last data filed at 04/01/18 1100   Gross per 24 hour   Intake          3479.33 ml   Output             2834 ml   Net           645.33 ml     Telemetry:  ST    Physical Exam   Constitutional:   Chronically ill    HENT:   Head: Normocephalic and atraumatic.   Eyes: Conjunctivae are normal. Pupils are equal, round, and reactive to light.   Neck: No JVD present. No thyromegaly present.   RIJ trialysis   Tracheostomy (C/D/I)   Cardiovascular: Regular rhythm.    Tachycardic   Pulmonary/Chest:   Coarse breath sounds throughout   Mostly clear    Abdominal: Soft. Bowel sounds are normal.   Musculoskeletal:   Trace - 1+ gen edema   Neurological:   Arouses to voice and nods head appropriately   Skin: Skin is warm and dry. Capillary refill takes less than 2 seconds. There is pallor.     Significant Labs:  CBC:    Recent Labs  Lab 03/31/18  2142 04/01/18  0635 04/01/18  1124   WBC 3.72* 3.47* 2.82*   RBC 2.09* 2.00* 2.62*   HGB 6.5* 6.2* 8.1*   HCT 20.8* 19.5* 25.3*   * 125* 107*   * 98 97   MCH 31.1* 31.0 30.9   MCHC 31.3* 31.8* 32.0     CMP:    Recent Labs  Lab 03/30/18  0217  03/31/18  0438 03/31/18  2142 04/01/18  0313   *  < > 146*  146* 154* 124*  124*   CALCIUM 8.5*  < > 8.4*  8.4* 8.1* 8.0*  8.0*   ALBUMIN 2.6*  < > 2.5*  2.5* 2.4* 2.5*  2.5*   PROT 6.3  --  6.1  --  6.0     < > 140  140 138 139  139   K 5.4*  < > 4.8  4.8 5.1 4.8  4.8   CO2 20*  < > 26  26 21* 22*  22*     < > 106  106 108 107  107   BUN 15  < > 6  6 8 5*  5*   CREATININE 1.2  < > 0.8  0.8 0.8 0.6  0.6   ALKPHOS 223*  --  193*  --  185*   ALT 18  --  17  --  15   AST 31  --  28  --  25   BILITOT 1.3*  --  1.2*  --  1.3*   < > = values in this interval not displayed.     Microbiology:  Microbiology Results (last 7 days)     Procedure Component Value Units Date/Time    Aerobic culture [[id number]] Collected:  03/28/18 1607    Order Status:  Completed Specimen:  Body Fluid from Lung, RLL Updated:  03/31/18 1209     Aerobic Bacterial Culture No growth    Blood culture [[id number]] Collected:  03/25/18 1554    Order Status:  Completed Specimen:  Blood from  Peripheral, Upper Arm, Right Updated:  03/30/18 1812     Blood Culture, Routine No growth after 5 days.    Blood culture [[id number]] Collected:  03/25/18 1606    Order Status:  Completed Specimen:  Blood from Peripheral, Forearm, Left Updated:  03/30/18 1812     Blood Culture, Routine No growth after 5 days.    AFB Culture & Smear [[id number]] Collected:  03/28/18 1607    Order Status:  Completed Specimen:  Body Fluid from Lung, RLL Updated:  03/29/18 2127     AFB Culture & Smear Culture in progress     AFB CULTURE STAIN No acid fast bacilli seen.    Culture, Anaerobe [[id number]] Collected:  03/28/18 1607    Order Status:  Completed Specimen:  Body Fluid from Lung, RLL Updated:  03/29/18 1329     Anaerobic Culture Culture in progress    Gram stain [[id number]] Collected:  03/28/18 1607    Order Status:  Completed Specimen:  Body Fluid from Lung, RLL Updated:  03/28/18 1654     Gram Stain Result Rare WBC's      No organisms seen    Fungus culture [[id number]] Collected:  03/28/18 1607    Order Status:  Sent Specimen:  Body Fluid from Lung, RLL Updated:  03/28/18 1617    Fungus culture [[id number]] Collected:  03/14/18 1137    Order Status:  Completed Specimen:  Bronchial Wash from Amniotic Fluid Updated:  03/28/18 0933     Fungus (Mycology) Culture Culture in progress     Fungus (Mycology) Culture No fungus isolated after 2 weeks    Culture, Respiratory with Gram Stain [[id number]] Collected:  03/25/18 1534    Order Status:  Completed Specimen:  Respiratory from Sputum, Induced Updated:  03/27/18 0739     Respiratory Culture Normal respiratory hank     Gram Stain (Respiratory) <10 epithelial cells per low power field.     Gram Stain (Respiratory) Rare WBC's     Gram Stain (Respiratory) Rare Gram positive cocci    Blood culture [[id number]] Collected:  03/21/18 0815    Order Status:  Completed Specimen:  Blood from Peripheral, Forearm, Left Updated:  03/26/18 1012     Blood Culture, Routine No growth after 5 days.    Urine  culture [[id number]]     Order Status:  Canceled Specimen:  Urine         I have reviewed all pertinent labs within the past 24 hours.    Estimated Creatinine Clearance: 146.9 mL/min (based on SCr of 0.6 mg/dL).    Diagnostic Results:  I have reviewed all pertinent imaging results/findings within the past 24 hours.

## 2018-04-01 NOTE — ASSESSMENT & PLAN NOTE
Chronic steroids HC 25mg IV BID   Agree with stress dose steroids during critical illness     Once diet started, can cause prandial excursions.

## 2018-04-01 NOTE — ASSESSMENT & PLAN NOTE
- TTE 3/26/18 showed normal graft function but has known history of restrictive CM post transplant  - Continue hydrocortisone 25mg BID for now  - Continue Tacro .5mg BID (sublingual) until enteral acces regained   - Cellcept remains on hold

## 2018-04-01 NOTE — SUBJECTIVE & OBJECTIVE
Interval History:   Off levophed since 10am yesterday. No events overnight. Received SLED from 6p-6a with -300cc/hr. CVP 3-4 around 5am and up to 5 this morning. Net +500cc yesterday (though net even if including insensible losses). Resting comfortably this morning; family at bedside. Receiving blood transfusion.     Review of patient's allergies indicates:   Allergen Reactions    No known drug allergies      Current Facility-Administered Medications   Medication Frequency    0.9%  NaCl infusion (CRRT USE ONLY) Continuous    0.9%  NaCl infusion (for blood administration) Q24H PRN    chlorhexidine 0.12 % solution 15 mL BID    dextrose 50% injection 12.5 g PRN    dextrose 50% injection 25 g PRN    famotidine (PF) injection 20 mg Daily    fentaNYL injection 50 mcg Q1H PRN    glucagon (human recombinant) injection 1 mg PRN    glucose chewable tablet 16 g PRN    glucose chewable tablet 24 g PRN    heparin (porcine) injection 5,000 Units Q12H    hydrocortisone sodium succinate injection 25 mg BID    insulin aspart U-100 pen 0-5 Units PRN    insulin regular (Humulin R) 100 Units in sodium chloride 0.9% 100 mL infusion Continuous    levothyroxine injection 88 mcg Daily    linezolid 600mg/300ml IVPB 600 mg Q12H    meropenem (MERREM) 2 g in sodium chloride 0.9% 100 mL IVPB Q12H    sodium chloride 0.9% flush 3 mL Q8H    tacrolimus capsule 0.5 mg BID       Objective:     Vital Signs (Most Recent):  Temp: 97.1 °F (36.2 °C) (04/01/18 1105)  Pulse: 109 (04/01/18 1238)  Resp: 16 (04/01/18 1238)  BP: 134/65 (03/31/18 1400)  SpO2: 100 % (04/01/18 1238)  O2 Device (Oxygen Therapy): Trach Collar (04/01/18 1002) Vital Signs (24h Range):  Temp:  [97.1 °F (36.2 °C)-98.2 °F (36.8 °C)] 97.1 °F (36.2 °C)  Pulse:  [107-265] 109  Resp:  [7-27] 16  SpO2:  [100 %] 100 %  BP: (127-134)/(64-65) 134/65  Arterial Line BP: ()/(42-59) 111/52     Weight: 73.2 kg (161 lb 6 oz) (04/01/18 0313)  Body mass index is 25.28  kg/m².  Body surface area is 1.86 meters squared.    I/O last 3 completed shifts:  In: 5591.4 [I.V.:4391.4; IV Piggyback:1200]  Out: 5306 [Other:5306]    Physical Exam   Constitutional: He appears well-developed and well-nourished. No distress.   HENT:   Head: Normocephalic and atraumatic.   Eyes: Conjunctivae and EOM are normal.   Cardiovascular: Normal rate and regular rhythm.    Pulmonary/Chest: He has no wheezes. He has no rales.   Abdominal: Soft. He exhibits no distension.   Musculoskeletal: He exhibits no edema or deformity.   Skin: Skin is warm and dry. He is not diaphoretic.       Significant Labs:  CBC:   Recent Labs  Lab 04/01/18  1124   WBC 2.82*   RBC 2.62*   HGB 8.1*   HCT 25.3*   *   MCV 97   MCH 30.9   MCHC 32.0     CMP:   Recent Labs  Lab 04/01/18  0313   *  124*   CALCIUM 8.0*  8.0*   ALBUMIN 2.5*  2.5*   PROT 6.0     139   K 4.8  4.8   CO2 22*  22*     107   BUN 5*  5*   CREATININE 0.6  0.6   ALKPHOS 185*   ALT 15   AST 25   BILITOT 1.3*     All labs within the past 24 hours have been reviewed.     Significant Imaging:  Labs: Reviewed

## 2018-04-01 NOTE — ASSESSMENT & PLAN NOTE
- baseline sCr 2.6-3.0 consistent with CKD stage IV  - anuric CACHORRO; likely ischemic ATN from prolonged pre-renal state (diarrhea) in setting of prograf renal vasoconstriction; cannot r/o septic ATN or Prograf toxicity  - SLED started 3/14  - remains anuric. Continues to need daily metabolic clearance with RRT  - intermittent pressor requirements during RRT the last few days. Performed SLED x 12 hours last night with decreased UF rate of 250-300cc/hr; seems to have tolerated well. Remains off pressors this AM though CVP 3-5. Net even volume status yesterday. Receiving blood transfusion this morning  - SLED x 12 hours tonight for metabolic clearance. UF goal 200-300cc/hr as tolerated by BP. Keep MAP > 65.

## 2018-04-01 NOTE — PLAN OF CARE
Problem: Patient Care Overview  Goal: Plan of Care Review  Outcome: Ongoing (interventions implemented as appropriate)  No acute events throughout day. See vital signs and assessments in flowsheets. See below for updates on today's progress.     Pulmonary: Patient is on tracheostomy collar, with oxygen saturation 100%    Cardiovascular: Sinus Tachy, rate ranges from 105 to115. CVP 4-6    Neurological: Awake, alert and coherent. Obeying commands. Calm and cooperative.    Gastrointestinal: NPO. BM  X 1    Genitourinary: Anuric, on CRRT (started at 1700).    Endocrine: CBG check  Q4, Aspart insulin PRN dose given on top of Human Regular Insulin Infusion    Integumentary/Other: Skin is intact, with blackish discoloration on his right 3rd toe.Q2 turning.    Infusions: Insulin and KVO    Patient progressing towards goals as tolerated, plan of care communicated and reviewed with Lv Crocker and family. All concerns addressed. Will continue to monitor.

## 2018-04-01 NOTE — PROGRESS NOTES
"[facility]  Endocrinology  Progress Note    Admit Date: 3/11/2018     Reason for Consult: abnormal TFTs    Surgical Procedure and Date: s/p heart transplant 2014     Diabetes diagnosis year: 7yo (T1DM)     Home Diabetes Medications:    Levemir 15u qHS  Novolog 4u AC     How often checking glucose at home? 4 times daily   BG readings on regimen: 150-200s  Hypoglycemia on the regimen?  Yes, rarely - treats appropriately (light snack, glucose tab)  Missed doses on regimen?  No        Diabetes Complications include:     Hyperglycemia, Hypoglycemia  and Diabetic chronic kidney disease          Complicating diabetes co morbidities:   N/A     HPI:   Patient is a 42 y.o. male with a diagnosis of T1DM, HTN, hypothyroidism, s/p heart transplant.  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 He required serial paracentesis and thoracentesis until he underwent a VATS with pleurX catheter placement on 1/11/2018 and removed 2/7/18.       Presented to hospital secondary to diarrhea and cough.  Upon initial labs, TSH was decreased (0.101) and free T4 1.33.  Endocrinology consulted to assist in management of these labs.  He was last seen in clinic by Kamala Vázquez NP 11/2017.   Previous hospitalization, endocrine consulted for same problem.  During that visit, recommended decreasing LT4 to 125mcg daily. Unfortunately, patient was discharged on previous dose of 150mcg daily.     Interval HPI:   Overnight events:  AF, tachycardic  Patient still NPO, no TF.   Notes that TPN should be discussed in future.  No nausea or hypoglycemia.    Intermittent levophed, and mechanical vent at night.    /65   Pulse (!) 116   Temp 97.8 °F (36.6 °C) (Oral)   Resp 11   Ht 5' 7" (1.702 m)   Wt 73.2 kg (161 lb 6 oz)   SpO2 100%   BMI 25.28 kg/m²       Labs Reviewed and Include      Recent Labs  Lab 04/01/18  0313 "   *  124*   CALCIUM 8.0*  8.0*   ALBUMIN 2.5*  2.5*   PROT 6.0     139   K 4.8  4.8   CO2 22*  22*     107   BUN 5*  5*   CREATININE 0.6  0.6   ALKPHOS 185*   ALT 15   AST 25   BILITOT 1.3*     Lab Results   Component Value Date    WBC 3.72 (L) 03/31/2018    HGB 6.5 (L) 03/31/2018    HCT 20.8 (L) 03/31/2018     (H) 03/31/2018     (L) 03/31/2018       Recent Labs  Lab 03/30/18  0217   TSH 9.412*   FREET4 0.81     Lab Results   Component Value Date    HGBA1C 6.9 (H) 02/11/2018       Nutritional status:   Body mass index is 25.28 kg/m².  Lab Results   Component Value Date    ALBUMIN 2.5 (L) 04/01/2018    ALBUMIN 2.5 (L) 04/01/2018    ALBUMIN 2.4 (L) 03/31/2018     Lab Results   Component Value Date    PREALBUMIN 5 (L) 03/15/2018    PREALBUMIN 18 (L) 03/24/2015    PREALBUMIN 19 (L) 12/17/2014       Estimated Creatinine Clearance: 146.9 mL/min (based on SCr of 0.6 mg/dL).    Accu-Checks  Recent Labs      03/30/18   1734  03/30/18   1952  03/30/18   2340  03/31/18   0507  03/31/18   0815  03/31/18   1200  03/31/18   1549  03/31/18   2146  04/01/18   0002  04/01/18   0312   POCTGLUCOSE  150*  135*  141*  107  207*  220*  253*  178*  161*  142*       Current Medications and/or Treatments Impacting Glycemic Control  Immunotherapy:  Immunosuppressants         Stop Route Frequency     tacrolimus capsule 0.5 mg      -- SL 2 times daily        Steroids:   Hormones     Start     Stop Route Frequency Ordered    03/31/18 0900  hydrocortisone sodium succinate injection 25 mg      -- IV 2 times daily 03/30/18 1711        Pressors:    Autonomic Drugs     Start     Stop Route Frequency Ordered    03/31/18 1430  norepinephrine 4 mg in dextrose 5% 250 mL infusion (premix) (titrating)     Question Answer Comment   Titrate by: (in mcg/kg/min) 0.02    Titrate interval: (in minutes) 15    Titrate to maintain: (MAP or SBP) MAP    Greater than: (in mmHg) 60    Maximum dose: (in mcg/kg/min) 2        --  IV Continuous 03/31/18 1419        Hyperglycemia/Diabetes Medications: Antihyperglycemics     Start     Stop Route Frequency Ordered    03/30/18 1030  insulin regular (Humulin R) 100 Units in sodium chloride 0.9% 100 mL infusion      -- IV Continuous 03/30/18 0927    03/30/18 1027  insulin aspart U-100 pen 0-5 Units      -- SubQ As needed (PRN) 03/30/18 0927          ASSESSMENT and PLAN    * Acute respiratory failure with hypoxia    Per primary    S/p trach        Type 1 diabetes mellitus with stage 3 chronic kidney disease    Continue transition at 0.5 units/hr.  POC q4  Low dose correction    Notify endocrine for changes in nutrition status (diet, TF, TPN)  Once definitive plan for diet, then consider SC insulin.    BG goal 140-180  Lab Results   Component Value Date    HGBA1C 6.9 (H) 02/11/2018     insulin gtt cannot be discontinued as he is a type 1 diabetic, discussed with nursing.    Discharge Recommendations:  TBD.        Hypothyroidism due to Hashimoto's thyroiditis    Abnormal TFTs upon admit.  Unclear if secondary to illness, but with evidence of iatrogenic hyperthyroidism in past while on above weight based dose.     TFTs indicate too little thyroid hormone. Possibly from missed doses from lack of PO access vs. Bowel edema with malabsorption.     Continue IV LT4   88 mcg daily.  Recheck TFTs in 4 weeks.          Acute renal failure superimposed on stage 4 chronic kidney disease    Avoid insulin stacking          Anemia    To be transfused one unit today.  Can falsely lower a1c in future.        Heart transplanted    Per transplant  Avoid hypoglycemia          Current use of steroid medication    Chronic steroids HC 25mg IV BID   Agree with stress dose steroids during critical illness     Once diet started, can cause prandial excursions.            Deonna Larry MD  Endocrinology  [facility]

## 2018-04-01 NOTE — PLAN OF CARE
Problem: Patient Care Overview  Goal: Plan of Care Review  Outcome: Ongoing (interventions implemented as appropriate)  No acute events throughout day. See vital signs and assessments in flowsheets. See below for updates on today's progress.      Pulmonary: Patient on trach collar and transitioned to ventilator for night at 2130 with oxygen saturation > 98%      Cardiovascular: -120s. Normotensive. CVP 3-4.      Neurological: Awake, follows commands, interactive,and smiles occasionally. Did not sleep during shift.      Gastrointestinal: BM x1.      Genitourinary: Anuric, CRRT 12 hr run completed during shift.     Endocrine: Accuchecks completed as ordered. PRN insulin provided     Integumentary/Other: Intact. Turned q2h.     Infusions: Insulin,     Patient progressing towards goals as tolerated, plan of care communicated and reviewed with Lv Crocker and family. All concerns addressed. Will continue to monitor.

## 2018-04-01 NOTE — ASSESSMENT & PLAN NOTE
- Appreciate Endocrine's recs  - Insulin gtt  - Recent blood transfusions will likely affect A1cs (will appear lower than they likely are)

## 2018-04-01 NOTE — PROGRESS NOTES
[facility]  Nephrology  Progress Note    Patient Name: Lv Crocker  MRN: [id number]  Admission Date: 3/11/2018  Hospital Length of Stay: 20 days  Attending Provider: Heriberto Rosa MD   Primary Care Physician: Philippe Mohr MD  Principal Problem:Acute respiratory failure with hypoxia    Subjective:     HPI: Mr. Crocker is a 43 yo WM with T1DM, Hashimoto thyroiditis, h/o OHTx 11/2014, and CKD stage 4 who was admitted on 3/11/18 for persistent diarrhea. He reported a 3 week history of diarrhea up to 15x/day. Associated symptoms including URI symptoms, coughing fits, and coughing syncope. Prograf level was 14.3. His post-heart transplant course has been complicated by AMR 4/2015, CMV, post-transplant restrictive cardiomyopathy, recurrent pleural effusions/ascites requiring monthly thoracenteses/paracenteses, VATS 1/11/18 with Pleur-X catheter (now removed), and CKD stage 4 with baseline sCr 2.6-3.0. Baseline -120s and baseline BP 90s-110s/60-70s. Upon admission he was started on IVF and diarrhea workup was initiated. On 3/12 he was noted to have decreased UOP despite fluids; NS was increased to 100cc/hr. He was scheduled for a colonoscopy on 3/13 however procedure was cancelled due to hypoxia and fever. He was transferred to the ICU for closer monitoring. He continued to have oliguria overnight. Bladder scan revealed 200cc of urine but patient refused osullivan catheter placement. Wife reports that patient always has a hard time urinating again after osullivan catheters are placed/removed so he refuses them. He remained hypoxic despite FiO2 70% and ABG revealed combined respiratory and metabolic acidosis with pH 7.17. He was started on BiPAP as well as a bicarb infusion at 50cc/hr. ABG with mild improvement. AM labs revealed K 6.2 and he was shifted and given kayexalate.Trialysis catheter was placed this morning and he subsequently developed hypotension and was started on levophed. He  was intubated later this morning. Nephrology consulted for CACHORRO. All history obtained by primary team and chart review as patient was intubated/sedated on exam. Consent for dialysis obtained by patient's wife and placed in chart. Of note, both of patient's parents were on dialysis.     Interval History:   Off levophed since 10am yesterday. No events overnight. Received SLED from 6p-6a with -300cc/hr. CVP 3-4 around 5am and up to 5 this morning. Net +500cc yesterday (though net even if including insensible losses). Resting comfortably this morning; family at bedside. Receiving blood transfusion.     Review of patient's allergies indicates:   Allergen Reactions    No known drug allergies      Current Facility-Administered Medications   Medication Frequency    0.9%  NaCl infusion (CRRT USE ONLY) Continuous    0.9%  NaCl infusion (for blood administration) Q24H PRN    chlorhexidine 0.12 % solution 15 mL BID    dextrose 50% injection 12.5 g PRN    dextrose 50% injection 25 g PRN    famotidine (PF) injection 20 mg Daily    fentaNYL injection 50 mcg Q1H PRN    glucagon (human recombinant) injection 1 mg PRN    glucose chewable tablet 16 g PRN    glucose chewable tablet 24 g PRN    heparin (porcine) injection 5,000 Units Q12H    hydrocortisone sodium succinate injection 25 mg BID    insulin aspart U-100 pen 0-5 Units PRN    insulin regular (Humulin R) 100 Units in sodium chloride 0.9% 100 mL infusion Continuous    levothyroxine injection 88 mcg Daily    linezolid 600mg/300ml IVPB 600 mg Q12H    meropenem (MERREM) 2 g in sodium chloride 0.9% 100 mL IVPB Q12H    sodium chloride 0.9% flush 3 mL Q8H    tacrolimus capsule 0.5 mg BID       Objective:     Vital Signs (Most Recent):  Temp: 97.1 °F (36.2 °C) (04/01/18 1105)  Pulse: 109 (04/01/18 1238)  Resp: 16 (04/01/18 1238)  BP: 134/65 (03/31/18 1400)  SpO2: 100 % (04/01/18 1238)  O2 Device (Oxygen Therapy): Trach Collar (04/01/18 1002) Vital Signs (24h  Range):  Temp:  [97.1 °F (36.2 °C)-98.2 °F (36.8 °C)] 97.1 °F (36.2 °C)  Pulse:  [107-265] 109  Resp:  [7-27] 16  SpO2:  [100 %] 100 %  BP: (127-134)/(64-65) 134/65  Arterial Line BP: ()/(42-59) 111/52     Weight: 73.2 kg (161 lb 6 oz) (04/01/18 0313)  Body mass index is 25.28 kg/m².  Body surface area is 1.86 meters squared.    I/O last 3 completed shifts:  In: 5591.4 [I.V.:4391.4; IV Piggyback:1200]  Out: 5306 [Other:5306]    Physical Exam   Constitutional: He appears well-developed and well-nourished. No distress.   HENT:   Head: Normocephalic and atraumatic.   Eyes: Conjunctivae and EOM are normal.   Cardiovascular: Normal rate and regular rhythm.    Pulmonary/Chest: He has no wheezes. He has no rales.   Abdominal: Soft. He exhibits no distension.   Musculoskeletal: He exhibits no edema or deformity.   Skin: Skin is warm and dry. He is not diaphoretic.       Significant Labs:  CBC:   Recent Labs  Lab 04/01/18  1124   WBC 2.82*   RBC 2.62*   HGB 8.1*   HCT 25.3*   *   MCV 97   MCH 30.9   MCHC 32.0     CMP:   Recent Labs  Lab 04/01/18  0313   *  124*   CALCIUM 8.0*  8.0*   ALBUMIN 2.5*  2.5*   PROT 6.0     139   K 4.8  4.8   CO2 22*  22*     107   BUN 5*  5*   CREATININE 0.6  0.6   ALKPHOS 185*   ALT 15   AST 25   BILITOT 1.3*     All labs within the past 24 hours have been reviewed.     Significant Imaging:  Labs: Reviewed    Assessment/Plan:     Acute renal failure superimposed on stage 4 chronic kidney disease    - baseline sCr 2.6-3.0 consistent with CKD stage IV  - anuric CACHORRO; likely ischemic ATN from prolonged pre-renal state (diarrhea) in setting of prograf renal vasoconstriction; cannot r/o septic ATN or Prograf toxicity  - SLED started 3/14  - remains anuric. Continues to need daily metabolic clearance with RRT  - intermittent pressor requirements during RRT the last few days. Performed SLED x 12 hours last night with decreased UF rate of 250-300cc/hr; seems to  have tolerated well. Remains off pressors this AM though CVP 3-5. Net even volume status yesterday. Receiving blood transfusion this morning  - SLED x 12 hours tonight for metabolic clearance. UF goal 200-300cc/hr as tolerated by BP. Keep MAP > 65.            Amairani Alegria, PGY-5  Nephrology Fellow  [facility]  Pager: [phone]

## 2018-04-01 NOTE — ASSESSMENT & PLAN NOTE
Continue transition at 0.5 units/hr.  POC q4  Low dose correction    Notify endocrine for changes in nutrition status (diet, TF, TPN)  Once definitive plan for diet, then consider SC insulin.    BG goal 140-180  Lab Results   Component Value Date    HGBA1C 6.9 (H) 02/11/2018     insulin gtt cannot be discontinued as he is a type 1 diabetic, discussed with nursing.    Discharge Recommendations:  TBD.

## 2018-04-01 NOTE — SUBJECTIVE & OBJECTIVE
"Interval HPI:   Overnight events:  AF, tachycardic  Patient still NPO, no TF.   Notes that TPN should be discussed in future.  No nausea or hypoglycemia.    Intermittent levophed, and mechanical vent at night.    /65   Pulse (!) 116   Temp 97.8 °F (36.6 °C) (Oral)   Resp 11   Ht 5' 7" (1.702 m)   Wt 73.2 kg (161 lb 6 oz)   SpO2 100%   BMI 25.28 kg/m²     Labs Reviewed and Include      Recent Labs  Lab 04/01/18  0313   *  124*   CALCIUM 8.0*  8.0*   ALBUMIN 2.5*  2.5*   PROT 6.0     139   K 4.8  4.8   CO2 22*  22*     107   BUN 5*  5*   CREATININE 0.6  0.6   ALKPHOS 185*   ALT 15   AST 25   BILITOT 1.3*     Lab Results   Component Value Date    WBC 3.72 (L) 03/31/2018    HGB 6.5 (L) 03/31/2018    HCT 20.8 (L) 03/31/2018     (H) 03/31/2018     (L) 03/31/2018       Recent Labs  Lab 03/30/18  0217   TSH 9.412*   FREET4 0.81     Lab Results   Component Value Date    HGBA1C 6.9 (H) 02/11/2018       Nutritional status:   Body mass index is 25.28 kg/m².  Lab Results   Component Value Date    ALBUMIN 2.5 (L) 04/01/2018    ALBUMIN 2.5 (L) 04/01/2018    ALBUMIN 2.4 (L) 03/31/2018     Lab Results   Component Value Date    PREALBUMIN 5 (L) 03/15/2018    PREALBUMIN 18 (L) 03/24/2015    PREALBUMIN 19 (L) 12/17/2014       Estimated Creatinine Clearance: 146.9 mL/min (based on SCr of 0.6 mg/dL).    Accu-Checks  Recent Labs      03/30/18   1734  03/30/18   1952  03/30/18   2340  03/31/18   0507  03/31/18   0815  03/31/18   1200  03/31/18   1549  03/31/18   2146  04/01/18   0002  04/01/18   0312   POCTGLUCOSE  150*  135*  141*  107  207*  220*  253*  178*  161*  142*       Current Medications and/or Treatments Impacting Glycemic Control  Immunotherapy:  Immunosuppressants         Stop Route Frequency     tacrolimus capsule 0.5 mg      -- SL 2 times daily        Steroids:   Hormones     Start     Stop Route Frequency Ordered    03/31/18 0900  hydrocortisone sodium succinate " injection 25 mg      -- IV 2 times daily 03/30/18 1711        Pressors:    Autonomic Drugs     Start     Stop Route Frequency Ordered    03/31/18 1430  norepinephrine 4 mg in dextrose 5% 250 mL infusion (premix) (titrating)     Question Answer Comment   Titrate by: (in mcg/kg/min) 0.02    Titrate interval: (in minutes) 15    Titrate to maintain: (MAP or SBP) MAP    Greater than: (in mmHg) 60    Maximum dose: (in mcg/kg/min) 2        -- IV Continuous 03/31/18 1419        Hyperglycemia/Diabetes Medications: Antihyperglycemics     Start     Stop Route Frequency Ordered    03/30/18 1030  insulin regular (Humulin R) 100 Units in sodium chloride 0.9% 100 mL infusion      -- IV Continuous 03/30/18 0927 03/30/18 1027  insulin aspart U-100 pen 0-5 Units      -- SubQ As needed (PRN) 03/30/18 0927

## 2018-04-01 NOTE — ASSESSMENT & PLAN NOTE
- S/P Bronch and intubation 3/14 now post tracheostomy due to inability to extubate   - Pulmonology following to help manage vent wean. Appreciate Recs   - RSV positive early in hospitalization. Completed course of Ribavarin/IVIG. Per lung transplant protocol for severe RSV (given myelosuppression).   - ID following. Appreciate recs.   - Continue Meropenem/Linezolid to finish 7 day course tomorrow.   - All cultures remain negative.

## 2018-04-01 NOTE — PLAN OF CARE
Problem: Patient Care Overview  Goal: Plan of Care Review  Outcome: Ongoing (interventions implemented as appropriate)  No acute events throughout day. See vital signs and assessments in flowsheets. See below for updates on today's progress.     Pulmonary: Shifted from mechanical vent support to Tracheostomy collar at 8lpm and 35% FIO2. Patient tolerating well, to be returned to vent setting tonight.    Cardiovascular: Sinus tachycardia, with heart rate 110-120bpm, levo titrated off around 10am. To keep MAP above 60 per Dr. Chung    Neurological: Patient is calm, cooperative and follows command    Gastrointestinal: Still NPO, BM X 2    Genitourinary: Started 12 hours CRRT at 1730    Endocrine: CBG every 4 hours, see flow sheets and MAR    Integumentary/Other: Mild to moderate edema noted. Blackish discoloration at right third toe noted. Turned Q2 hours    Infusions: Still on Insulin 0.5units per hour     Patient progressing towards goals as tolerated, plan of care communicated and reviewed with Lv Crocker and family. All concerns addressed. Will continue to monitor.

## 2018-04-01 NOTE — NURSING
H/H resulted at 6.5/20.4. Patient hemodynamically stable and without preeti signs of bleeding. Ángel ABRAHAM aware. No new orders at this time. Continuing to monitor.

## 2018-04-01 NOTE — PROGRESS NOTES
[facility]  Heart Transplant  Progress Note    Patient Name: Lv Crocker  MRN: [id number]  Admission Date: 3/11/2018  Hospital Length of Stay: 20 days  Attending Physician: Heriberto Rosa MD  Primary Care Provider: Philippe Mohr MD  Principal Problem:Acute respiratory failure with hypoxia    Subjective:     Interval History: Remains afebrile and off levophed over the past 24 hours. Hgb low this AM requiring PRBC x1. Interactive with exam today. Another discussion with wife regarding NG/TPN/PEG. Tolerated trach collar most of the day yesterday and did well with only pressure support on vent overnight.     Continuous Infusions:   sodium chloride 0.9%      insulin (HUMAN R) infusion (adults) 0.5 Units/hr (04/01/18 1100)     Scheduled Meds:   chlorhexidine  15 mL Mouth/Throat BID    famotidine (PF)  20 mg Intravenous Daily    heparin (porcine)  5,000 Units Subcutaneous Q12H    hydrocortisone sodium succinate  25 mg Intravenous BID    levothyroxine  88 mcg Intravenous Daily    linezolid 600mg/300ml  600 mg Intravenous Q12H    meropenem (MERREM) IVPB  2 g Intravenous Q12H    sodium chloride 0.9%  3 mL Intravenous Q8H    tacrolimus  0.5 mg Sublingual BID     PRN Meds:sodium chloride, dextrose 50%, dextrose 50%, fentaNYL, glucagon (human recombinant), glucose, glucose, insulin aspart U-100    Review of patient's allergies indicates:   Allergen Reactions    No known drug allergies      Objective:     Vital Signs (Most Recent):  Temp: 97.1 °F (36.2 °C) (04/01/18 1105)  Pulse: 109 (04/01/18 1238)  Resp: 16 (04/01/18 1238)  BP: 134/65 (03/31/18 1400)  SpO2: 100 % (04/01/18 1238) Vital Signs (24h Range):  Temp:  [97.1 °F (36.2 °C)-98.2 °F (36.8 °C)] 97.1 °F (36.2 °C)  Pulse:  [107-265] 109  Resp:  [7-27] 16  SpO2:  [100 %] 100 %  BP: (134)/(65) 134/65  Arterial Line BP: ()/(42-59) 111/52     Patient Vitals for the past 72 hrs (Last 3 readings):   Weight   04/01/18 0313 73.2 kg  (161 lb 6 oz)   03/31/18 0400 73.2 kg (161 lb 6 oz)   03/30/18 0300 73.3 kg (161 lb 9.6 oz)     Body mass index is 25.28 kg/m².      Intake/Output Summary (Last 24 hours) at 04/01/18 1334  Last data filed at 04/01/18 1100   Gross per 24 hour   Intake          3479.33 ml   Output             2834 ml   Net           645.33 ml     Telemetry: ST    Physical Exam   Constitutional:   Chronically ill    HENT:   Head: Normocephalic and atraumatic.   Eyes: Conjunctivae are normal. Pupils are equal, round, and reactive to light.   Neck: No JVD present. No thyromegaly present.   RIJ trialysis   Tracheostomy (C/D/I)   Cardiovascular: Regular rhythm.    Tachycardic   Pulmonary/Chest:   Coarse breath sounds throughout   Mostly clear    Abdominal: Soft. Bowel sounds are normal.   Musculoskeletal:   Trace - 1+ gen edema   Neurological:   Arouses to voice and nods head appropriately   Skin: Skin is warm and dry. Capillary refill takes less than 2 seconds. There is pallor.     Significant Labs:  CBC:    Recent Labs  Lab 03/31/18  2142 04/01/18  0635 04/01/18  1124   WBC 3.72* 3.47* 2.82*   RBC 2.09* 2.00* 2.62*   HGB 6.5* 6.2* 8.1*   HCT 20.8* 19.5* 25.3*   * 125* 107*   * 98 97   MCH 31.1* 31.0 30.9   MCHC 31.3* 31.8* 32.0     CMP:    Recent Labs  Lab 03/30/18  0217  03/31/18  0438 03/31/18  2142 04/01/18  0313   *  < > 146*  146* 154* 124*  124*   CALCIUM 8.5*  < > 8.4*  8.4* 8.1* 8.0*  8.0*   ALBUMIN 2.6*  < > 2.5*  2.5* 2.4* 2.5*  2.5*   PROT 6.3  --  6.1  --  6.0     < > 140  140 138 139  139   K 5.4*  < > 4.8  4.8 5.1 4.8  4.8   CO2 20*  < > 26  26 21* 22*  22*     < > 106  106 108 107  107   BUN 15  < > 6  6 8 5*  5*   CREATININE 1.2  < > 0.8  0.8 0.8 0.6  0.6   ALKPHOS 223*  --  193*  --  185*   ALT 18  --  17  --  15   AST 31  --  28  --  25   BILITOT 1.3*  --  1.2*  --  1.3*   < > = values in this interval not displayed.     Microbiology:  Microbiology Results (last 7 days)      Procedure Component Value Units Date/Time    Aerobic culture [[id number]] Collected:  03/28/18 1607    Order Status:  Completed Specimen:  Body Fluid from Lung, RLL Updated:  03/31/18 1209     Aerobic Bacterial Culture No growth    Blood culture [[id number]] Collected:  03/25/18 1554    Order Status:  Completed Specimen:  Blood from Peripheral, Upper Arm, Right Updated:  03/30/18 1812     Blood Culture, Routine No growth after 5 days.    Blood culture [[id number]] Collected:  03/25/18 1606    Order Status:  Completed Specimen:  Blood from Peripheral, Forearm, Left Updated:  03/30/18 1812     Blood Culture, Routine No growth after 5 days.    AFB Culture & Smear [[id number]] Collected:  03/28/18 1607    Order Status:  Completed Specimen:  Body Fluid from Lung, RLL Updated:  03/29/18 2127     AFB Culture & Smear Culture in progress     AFB CULTURE STAIN No acid fast bacilli seen.    Culture, Anaerobe [[id number]] Collected:  03/28/18 1607    Order Status:  Completed Specimen:  Body Fluid from Lung, RLL Updated:  03/29/18 1329     Anaerobic Culture Culture in progress    Gram stain [[id number]] Collected:  03/28/18 1607    Order Status:  Completed Specimen:  Body Fluid from Lung, RLL Updated:  03/28/18 1654     Gram Stain Result Rare WBC's      No organisms seen    Fungus culture [[id number]] Collected:  03/28/18 1607    Order Status:  Sent Specimen:  Body Fluid from Lung, RLL Updated:  03/28/18 1617    Fungus culture [[id number]] Collected:  03/14/18 1137    Order Status:  Completed Specimen:  Bronchial Wash from Amniotic Fluid Updated:  03/28/18 0933     Fungus (Mycology) Culture Culture in progress     Fungus (Mycology) Culture No fungus isolated after 2 weeks    Culture, Respiratory with Gram Stain [[id number]] Collected:  03/25/18 1534    Order Status:  Completed Specimen:  Respiratory from Sputum, Induced Updated:  03/27/18 0739     Respiratory Culture Normal respiratory hank     Gram Stain (Respiratory) <10  epithelial cells per low power field.     Gram Stain (Respiratory) Rare WBC's     Gram Stain (Respiratory) Rare Gram positive cocci    Blood culture [[id number]] Collected:  03/21/18 0815    Order Status:  Completed Specimen:  Blood from Peripheral, Forearm, Left Updated:  03/26/18 1012     Blood Culture, Routine No growth after 5 days.    Urine culture [[id number]]     Order Status:  Canceled Specimen:  Urine         I have reviewed all pertinent labs within the past 24 hours.    Estimated Creatinine Clearance: 146.9 mL/min (based on SCr of 0.6 mg/dL).    Diagnostic Results:  I have reviewed all pertinent imaging results/findings within the past 24 hours.    Assessment and Plan:     Extended Hospital Stay with complicated hospital course     * Acute respiratory failure with hypoxia    - S/P Bronch and intubation 3/14 now post tracheostomy due to inability to extubate   - Pulmonology following to help manage vent wean. Appreciate Recs   - RSV positive early in hospitalization. Completed course of Ribavarin/IVIG. Per lung transplant protocol for severe RSV (given myelosuppression).   - ID following. Appreciate recs.   - Continue Meropenem/Linezolid to finish 7 day course tomorrow.   - All cultures remain negative.         Heart transplanted    - TTE 3/26/18 showed normal graft function but has known history of restrictive CM post transplant  - Continue hydrocortisone 25mg BID for now  - Continue Tacro .5mg BID (sublingual) until enteral acces regained   - Cellcept remains on hold        Type 1 diabetes mellitus with stage 3 chronic kidney disease    - Appreciate Endocrine's recs  - Insulin gtt  - Recent blood transfusions will likely affect A1cs (will appear lower than they likely are)         Hypothyroidism due to Hashimoto's thyroiditis    - Appreciate Endocrine's recs  - Continue IV levothyroxine 88mcg        Restrictive cardiomyopathy    - Has known history of recurrent ascites and pleural effusions   - S/P  thoracentesis X 2, followed by VATS/pleurex cath placement (January 2018) with removal of pleurex (February 2018) and 3rd thoracentesis 2/14/18 with no significant findings on fluid removal.            Acute renal failure superimposed on stage 4 chronic kidney disease    - [facility] Nephrology recs.   - Intermittent CRRT   - Completely off levophed         Anemia    - Hgb 6.2 this AM  - Will give 1 unit of PRBC and recheck         Debility    - PT/OT daily   - This will take time and require adequate nutrition  - Pre-albumin tomorrow             Mamadou rhodes, DO  Heart Transplant  [facility]

## 2018-04-02 PROBLEM — Z79.52 CURRENT CHRONIC USE OF SYSTEMIC STEROIDS: Status: ACTIVE | Noted: 2018-01-01

## 2018-04-02 NOTE — ASSESSMENT & PLAN NOTE
BG goal 140-1180    rewrite transition at 0.5 units/hr. POC q4 Low dose correction  Discussed with nurse, pt is T1DM, do not suspend insulin >1 hr    If BG trends down on 0.1u/hr, then will need to add dextrose.   Notify endo of TF/TPN    Discharge Recommendations:  TBD.

## 2018-04-02 NOTE — NURSING
Post Heart Transplant Coordinator note.    Visited with pt's wife in CMICU. Pt sleeping, connected to ventilator. Pt's wife stated he received ativan this morning due to anxiety. She stated pt wants to go home, and didn't sleep last night. Pt's wife is very tired and cried bc lack of working and worried about finances. She stated she needs to work, but is afraid to leave pt in hospital. She went home Friday and stayed there over night. WE talked about her going back to work part time while pt is still in hospital. She will talk to her boss.

## 2018-04-02 NOTE — PROGRESS NOTES
[facility]  Heart Transplant  Progress Note    Patient Name: Lv Crocker  MRN: [id number]  Admission Date: 3/11/2018  Hospital Length of Stay: 21 days  Attending Physician: Heriberto Rosa MD  Primary Care Provider: Philippe Mohr MD  Principal Problem:Acute respiratory failure with hypoxia    Subjective:     Interval History: Very anxious this am and nauseous. On Bipap through trach.     Continuous Infusions:   sodium chloride 0.9% Stopped (04/02/18 0513)    insulin (HUMAN R) infusion (adults) Stopped (04/02/18 0316)     Scheduled Meds:   chlorhexidine  15 mL Mouth/Throat BID    famotidine (PF)  20 mg Intravenous Daily    heparin (porcine)  5,000 Units Subcutaneous Q12H    hydrocortisone sodium succinate  25 mg Intravenous BID    levothyroxine  88 mcg Intravenous Daily    sodium chloride 0.9%  3 mL Intravenous Q8H    tacrolimus  0.5 mg Sublingual BID     PRN Meds:sodium chloride, alprazolam ODT, dextrose 50%, dextrose 50%, fentaNYL, glucagon (human recombinant), glucose, glucose, insulin aspart U-100, ondansetron    Review of patient's allergies indicates:   Allergen Reactions    No known drug allergies      Objective:     Vital Signs (Most Recent):  Temp: 97.6 °F (36.4 °C) (04/02/18 0306)  Pulse: (!) 119 (04/02/18 0600)  Resp: 17 (04/02/18 0600)  BP: 134/65 (03/31/18 1400)  SpO2: 100 % (04/02/18 0600) Vital Signs (24h Range):  Temp:  [97.1 °F (36.2 °C)-97.6 °F (36.4 °C)] 97.6 °F (36.4 °C)  Pulse:  [107-265] 119  Resp:  [10-27] 17  SpO2:  [98 %-100 %] 100 %  Arterial Line BP: (101-142)/(46-68) 101/46     Patient Vitals for the past 72 hrs (Last 3 readings):   Weight   04/02/18 0300 65.9 kg (145 lb 4.5 oz)   04/01/18 0313 73.2 kg (161 lb 6 oz)   03/31/18 0400 73.2 kg (161 lb 6 oz)     Body mass index is 22.75 kg/m².      Intake/Output Summary (Last 24 hours) at 04/02/18 0805  Last data filed at 04/02/18 0600   Gross per 24 hour   Intake          3341.86 ml   Output              3670 ml   Net          -328.14 ml     Telemetry: ST    Physical Exam   Constitutional:   Chronically ill    HENT:   Head: Normocephalic and atraumatic.   Eyes: Conjunctivae are normal. Pupils are equal, round, and reactive to light.   Neck: No JVD present. No thyromegaly present.   RIJ trialysis   Tracheostomy (C/D/I)   Cardiovascular: Regular rhythm.    Tachycardic   Pulmonary/Chest:   Coarse breath sounds throughout   Mostly clear    Abdominal: Soft. Bowel sounds are normal.   Musculoskeletal:   Trace - 1+ gen edema   Neurological:   Anxious this am.    Skin: Skin is warm and dry. Capillary refill takes less than 2 seconds. There is pallor.     Significant Labs:  CBC:    Recent Labs  Lab 04/01/18  0635 04/01/18  1124 04/01/18 1954   WBC 3.47* 2.82* 2.93*   RBC 2.00* 2.62* 2.62*   HGB 6.2* 8.1* 8.0*   HCT 19.5* 25.3* 25.2*   * 107* 124*   MCV 98 97 96   MCH 31.0 30.9 30.5   MCHC 31.8* 32.0 31.7*     CMP:    Recent Labs  Lab 03/31/18  0438  04/01/18  0313 04/01/18  2147 04/02/18  0314   *  146*  < > 124*  124* 143*  143* 138*  138*   CALCIUM 8.4*  8.4*  < > 8.0*  8.0* 8.1*  8.1* 8.3*  8.3*   ALBUMIN 2.5*  2.5*  < > 2.5*  2.5* 2.5*  2.5* 2.6*  2.6*   PROT 6.1  --  6.0  --  6.3     140  < > 139  139 138  138 137  137   K 4.8  4.8  < > 4.8  4.8 4.8  4.8 4.9  4.9   CO2 26  26  < > 22*  22* 22*  22* 21*  21*     106  < > 107  107 106  106 105  105   BUN 6  6  < > 5*  5* 7  7 5*  5*   CREATININE 0.8  0.8  < > 0.6  0.6 0.7  0.7 0.7  0.7   ALKPHOS 193*  --  185*  --  189*   ALT 17  --  15  --  15   AST 28  --  25  --  24   BILITOT 1.2*  --  1.3*  --  1.1*   < > = values in this interval not displayed.     Microbiology:  Microbiology Results (last 7 days)     Procedure Component Value Units Date/Time    Aerobic culture [[id number]] Collected:  03/28/18 1607    Order Status:  Completed Specimen:  Body Fluid from Lung, RLL Updated:  03/31/18 1209     Aerobic  Bacterial Culture No growth    Blood culture [[id number]] Collected:  03/25/18 1554    Order Status:  Completed Specimen:  Blood from Peripheral, Upper Arm, Right Updated:  03/30/18 1812     Blood Culture, Routine No growth after 5 days.    Blood culture [[id number]] Collected:  03/25/18 1606    Order Status:  Completed Specimen:  Blood from Peripheral, Forearm, Left Updated:  03/30/18 1812     Blood Culture, Routine No growth after 5 days.    AFB Culture & Smear [[id number]] Collected:  03/28/18 1607    Order Status:  Completed Specimen:  Body Fluid from Lung, RLL Updated:  03/29/18 2127     AFB Culture & Smear Culture in progress     AFB CULTURE STAIN No acid fast bacilli seen.    Culture, Anaerobe [[id number]] Collected:  03/28/18 1607    Order Status:  Completed Specimen:  Body Fluid from Lung, RLL Updated:  03/29/18 1329     Anaerobic Culture Culture in progress    Gram stain [[id number]] Collected:  03/28/18 1607    Order Status:  Completed Specimen:  Body Fluid from Lung, RLL Updated:  03/28/18 1654     Gram Stain Result Rare WBC's      No organisms seen    Fungus culture [[id number]] Collected:  03/28/18 1607    Order Status:  Sent Specimen:  Body Fluid from Lung, RLL Updated:  03/28/18 1617    Fungus culture [[id number]] Collected:  03/14/18 1137    Order Status:  Completed Specimen:  Bronchial Wash from Amniotic Fluid Updated:  03/28/18 0933     Fungus (Mycology) Culture Culture in progress     Fungus (Mycology) Culture No fungus isolated after 2 weeks    Culture, Respiratory with Gram Stain [[id number]] Collected:  03/25/18 1534    Order Status:  Completed Specimen:  Respiratory from Sputum, Induced Updated:  03/27/18 0739     Respiratory Culture Normal respiratory hank     Gram Stain (Respiratory) <10 epithelial cells per low power field.     Gram Stain (Respiratory) Rare WBC's     Gram Stain (Respiratory) Rare Gram positive cocci    Blood culture [[id number]] Collected:  03/21/18 0815    Order Status:   Completed Specimen:  Blood from Peripheral, Forearm, Left Updated:  03/26/18 1012     Blood Culture, Routine No growth after 5 days.        I have reviewed all pertinent labs within the past 24 hours.    Estimated Creatinine Clearance: 125.5 mL/min (based on SCr of 0.7 mg/dL).    Diagnostic Results:  I have reviewed all pertinent imaging results/findings within the past 24 hours.    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 Most recently has had ongoing issues with his lungs, had gotten 2 thoracenteses, after which he underwent VATS 01/19/18 followed by pleurex catheter placement and effusion drainage 01/11/18, subsequently had it removed 02/07/18 after drainage had decreased despite multiple attempts to drain it. Has been having significant coughing fits that result in him being near-syncopal at times, although difficult to say if he has passed out or not- patient most recently was admitted to the hospital for increased AGUILAR, coughing and pre-syncope 02/11-02/14/18 at [facility].   Patient was started on antibiotics for suspected bacterial infection, with some diarrhea, bronchitis, and possible pneumonia. Ct chest showed some pleural fluid collection and after talking with Dr. James, we arranged for him to receive a diagnostic tap with IR, from which the fluid collection demonstrated no growth or significant findings at all really. Cell counts do not appear to have been sent but will recheck. Patient states since his hospital stay, yesterday had a significant coughing fit again, after which he nearly passed out, is being seen in clinic today for this. Denies any cardiopulmonary complaints, no leg swelling, has some abdominal swelling, which is moderate for him. Denies significant shortness of breath with mild exertion, had  6MWT yesterday to see if he qualified for home O2, and his O2 sats actually improved after recovery from where he started initially. He and his wife admit he is in bed most days, not very active.     Mr. Crocker presented to the ED 3/11/18 with approximately 3 weeks of worsening diarrhea and 2-3 days of sinus pressure, drainage, and worsened cough.  He notes that he had a coughing fit last night and passed out, coughed throughout most of the night.  This also happened on 2/25/18.  He last saw Dr Ferreira in clinic on 2/20/18 where he was taken off myfortic and switched to cellcept (he hadn't been on cellcept because of leukopenia when they tried post-transplant).  Though his diarrhea had improved after his last discharge, he states it has increased now to 15x/day, clear/yellow/green, no fevers, no exacerbating foods per say.  He was taken back off the cellcept and placed back on myfortic this past week and has not noticed immediate change in diarrhea. He also reports his baseline coughing fits havent improved and are minimally responsive to tessalon pearls.  Came on last night, he lost consciousness during a fit once which is relatively normal for him, and had two more during HPI.  He notes no sick contacts but does state he's had some URI symptoms as above.  His baseline vitals are usually -120 and BP 90s/60s-110s/70s.  He reports a history of intermittent diarrhea - did have Cdiff many years ago but this smells different.  No abdominal pain, no nausea, no vomiting.  No blood in diarrhea.  Appears last c-scope in 2015 with no evidence of infection or inflammatory processes.  He does report IBS which is different that this.       * Acute respiratory failure with hypoxia    - S/P Bronch and intubation 3/14 now post tracheostomy due to inability to extubate   - Pulmonology following to help manage vent wean. Appreciate Recs   - RSV positive early in hospitalization. Completed course of Ribavarin/IVIG. Per lung  transplant protocol for severe RSV (given myelosuppression).   - ID following. Appreciate recs.   - Completed 7 day course of Meropenem/Linezolid.   - All cultures remain negative.         Acute renal failure superimposed on stage 4 chronic kidney disease    - Appreciate Nephrology recs.   - Intermittent CRRT.  - Completely off levophed.        Heart transplanted    - TTE 3/26/18 showed normal graft function but has known history of restrictive CM post transplant.  - Continue hydrocortisone 25mg BID for now.  - Continue Tacro .5mg BID (sublingual) until enteral acces regained.  - Cellcept remains on hold.        Restrictive cardiomyopathy    - Has known history of recurrent ascites and pleural effusions   - S/P thoracentesis X 2, followed by VATS/pleurex cath placement (January 2018) with removal of pleurex (February 2018) and 3rd thoracentesis 2/14/18 with no significant findings on fluid removal.        Type 1 diabetes mellitus with stage 3 chronic kidney disease    - Appreciate Endocrine's recs  - Insulin gtt  - Recent blood transfusions will likely affect A1cs (will appear lower than they likely are)         Hypothyroidism due to Hashimoto's thyroiditis    - Appreciate Endocrine's recs  - Continue IV levothyroxine 88mcg        Anemia    - Received 1 unit of PRBC yesterday. HH pending this am.          Debility    - PT/OT daily   - This will take time and require adequate nutrition  - Pre-albumin tomorrow   - Pt against Lavelle tube and on fence about PEG tube. Will get another US abdomen today to look at ascites and help gauge if PEG is option. Will also consider TPN/PPN.        Anxiety    - Pt on nortriptyline/escitalopram at home. Held as no enteric feeding option at this point. Will start prn alprazolam.         Critical Care Time: 40 minutes     Critical care was time spent personally by me on the following activities: development of treatment plan with patient or surrogate and bedside caregivers, discussions  with consultants, evaluation of patient's response to treatment, examination of patient, ordering and performing treatments and interventions, ordering and review of laboratory studies, ordering and review of radiographic studies, pulse oximetry, re-evaluation of patient's condition. This critical care time did not overlap with that of any other provider or involve time for any procedures.          Tim Mao NP  Heart Transplant  [facility]

## 2018-04-02 NOTE — ASSESSMENT & PLAN NOTE
- Pt on nortriptyline/escitalopram at home. Held as no enteric feeding option at this point. Will start prn alprazolam.

## 2018-04-02 NOTE — SUBJECTIVE & OBJECTIVE
Interval History: Very anxious this am and nauseous. On Bipap through trach.     Continuous Infusions:   sodium chloride 0.9% Stopped (04/02/18 0513)    insulin (HUMAN R) infusion (adults) Stopped (04/02/18 0316)     Scheduled Meds:   chlorhexidine  15 mL Mouth/Throat BID    famotidine (PF)  20 mg Intravenous Daily    heparin (porcine)  5,000 Units Subcutaneous Q12H    hydrocortisone sodium succinate  25 mg Intravenous BID    levothyroxine  88 mcg Intravenous Daily    sodium chloride 0.9%  3 mL Intravenous Q8H    tacrolimus  0.5 mg Sublingual BID     PRN Meds:sodium chloride, alprazolam ODT, dextrose 50%, dextrose 50%, fentaNYL, glucagon (human recombinant), glucose, glucose, insulin aspart U-100, ondansetron    Review of patient's allergies indicates:   Allergen Reactions    No known drug allergies      Objective:     Vital Signs (Most Recent):  Temp: 97.6 °F (36.4 °C) (04/02/18 0306)  Pulse: (!) 119 (04/02/18 0600)  Resp: 17 (04/02/18 0600)  BP: 134/65 (03/31/18 1400)  SpO2: 100 % (04/02/18 0600) Vital Signs (24h Range):  Temp:  [97.1 °F (36.2 °C)-97.6 °F (36.4 °C)] 97.6 °F (36.4 °C)  Pulse:  [107-265] 119  Resp:  [10-27] 17  SpO2:  [98 %-100 %] 100 %  Arterial Line BP: (101-142)/(46-68) 101/46     Patient Vitals for the past 72 hrs (Last 3 readings):   Weight   04/02/18 0300 65.9 kg (145 lb 4.5 oz)   04/01/18 0313 73.2 kg (161 lb 6 oz)   03/31/18 0400 73.2 kg (161 lb 6 oz)     Body mass index is 22.75 kg/m².      Intake/Output Summary (Last 24 hours) at 04/02/18 0805  Last data filed at 04/02/18 0600   Gross per 24 hour   Intake          3341.86 ml   Output             3670 ml   Net          -328.14 ml     Telemetry: ST    Physical Exam   Constitutional:   Chronically ill    HENT:   Head: Normocephalic and atraumatic.   Eyes: Conjunctivae are normal. Pupils are equal, round, and reactive to light.   Neck: No JVD present. No thyromegaly present.   RIJ trialysis   Tracheostomy (C/D/I)   Cardiovascular:  Regular rhythm.    Tachycardic   Pulmonary/Chest:   Coarse breath sounds throughout   Mostly clear    Abdominal: Soft. Bowel sounds are normal.   Musculoskeletal:   Trace - 1+ gen edema   Neurological:   Anxious this am.    Skin: Skin is warm and dry. Capillary refill takes less than 2 seconds. There is pallor.     Significant Labs:  CBC:    Recent Labs  Lab 04/01/18  0635 04/01/18  1124 04/01/18  1954   WBC 3.47* 2.82* 2.93*   RBC 2.00* 2.62* 2.62*   HGB 6.2* 8.1* 8.0*   HCT 19.5* 25.3* 25.2*   * 107* 124*   MCV 98 97 96   MCH 31.0 30.9 30.5   MCHC 31.8* 32.0 31.7*     CMP:    Recent Labs  Lab 03/31/18  0438  04/01/18  0313 04/01/18  2147 04/02/18  0314   *  146*  < > 124*  124* 143*  143* 138*  138*   CALCIUM 8.4*  8.4*  < > 8.0*  8.0* 8.1*  8.1* 8.3*  8.3*   ALBUMIN 2.5*  2.5*  < > 2.5*  2.5* 2.5*  2.5* 2.6*  2.6*   PROT 6.1  --  6.0  --  6.3     140  < > 139  139 138  138 137  137   K 4.8  4.8  < > 4.8  4.8 4.8  4.8 4.9  4.9   CO2 26  26  < > 22*  22* 22*  22* 21*  21*     106  < > 107  107 106  106 105  105   BUN 6  6  < > 5*  5* 7  7 5*  5*   CREATININE 0.8  0.8  < > 0.6  0.6 0.7  0.7 0.7  0.7   ALKPHOS 193*  --  185*  --  189*   ALT 17  --  15  --  15   AST 28  --  25  --  24   BILITOT 1.2*  --  1.3*  --  1.1*   < > = values in this interval not displayed.     Microbiology:  Microbiology Results (last 7 days)     Procedure Component Value Units Date/Time    Aerobic culture [[id number]] Collected:  03/28/18 1607    Order Status:  Completed Specimen:  Body Fluid from Lung, RLL Updated:  03/31/18 1209     Aerobic Bacterial Culture No growth    Blood culture [[id number]] Collected:  03/25/18 1554    Order Status:  Completed Specimen:  Blood from Peripheral, Upper Arm, Right Updated:  03/30/18 1812     Blood Culture, Routine No growth after 5 days.    Blood culture [[id number]] Collected:  03/25/18 1606    Order Status:  Completed Specimen:  Blood from  Peripheral, Forearm, Left Updated:  03/30/18 1812     Blood Culture, Routine No growth after 5 days.    AFB Culture & Smear [[id number]] Collected:  03/28/18 1607    Order Status:  Completed Specimen:  Body Fluid from Lung, RLL Updated:  03/29/18 2127     AFB Culture & Smear Culture in progress     AFB CULTURE STAIN No acid fast bacilli seen.    Culture, Anaerobe [[id number]] Collected:  03/28/18 1607    Order Status:  Completed Specimen:  Body Fluid from Lung, RLL Updated:  03/29/18 1329     Anaerobic Culture Culture in progress    Gram stain [[id number]] Collected:  03/28/18 1607    Order Status:  Completed Specimen:  Body Fluid from Lung, RLL Updated:  03/28/18 1654     Gram Stain Result Rare WBC's      No organisms seen    Fungus culture [[id number]] Collected:  03/28/18 1607    Order Status:  Sent Specimen:  Body Fluid from Lung, RLL Updated:  03/28/18 1617    Fungus culture [[id number]] Collected:  03/14/18 1137    Order Status:  Completed Specimen:  Bronchial Wash from Amniotic Fluid Updated:  03/28/18 0933     Fungus (Mycology) Culture Culture in progress     Fungus (Mycology) Culture No fungus isolated after 2 weeks    Culture, Respiratory with Gram Stain [[id number]] Collected:  03/25/18 1534    Order Status:  Completed Specimen:  Respiratory from Sputum, Induced Updated:  03/27/18 0739     Respiratory Culture Normal respiratory hank     Gram Stain (Respiratory) <10 epithelial cells per low power field.     Gram Stain (Respiratory) Rare WBC's     Gram Stain (Respiratory) Rare Gram positive cocci    Blood culture [[id number]] Collected:  03/21/18 0815    Order Status:  Completed Specimen:  Blood from Peripheral, Forearm, Left Updated:  03/26/18 1012     Blood Culture, Routine No growth after 5 days.        I have reviewed all pertinent labs within the past 24 hours.    Estimated Creatinine Clearance: 125.5 mL/min (based on SCr of 0.7 mg/dL).    Diagnostic Results:  I have reviewed all pertinent imaging  results/findings within the past 24 hours.

## 2018-04-02 NOTE — ASSESSMENT & PLAN NOTE
- S/P Bronch and intubation 3/14 now post tracheostomy due to inability to extubate   - Pulmonology following to help manage vent wean. Appreciate Recs   - RSV positive early in hospitalization. Completed course of Ribavarin/IVIG. Per lung transplant protocol for severe RSV (given myelosuppression).   - ID following. Appreciate recs.   - Completed 7 day course of Meropenem/Linezolid.   - All cultures remain negative.

## 2018-04-02 NOTE — ASSESSMENT & PLAN NOTE
- PT/OT daily   - This will take time and require adequate nutrition  - Pre-albumin tomorrow   - Pt against Lavelle tube and on fence about PEG tube. Will get another US abdomen today to look at ascites and help gauge if PEG is option. Will also consider TPN/PPN.

## 2018-04-02 NOTE — PROGRESS NOTES
[facility]  Nephrology  Progress Note    Patient Name: Lv Crocker  MRN: [id number]  Admission Date: 3/11/2018  Hospital Length of Stay: 21 days  Attending Provider: Heriberto Rosa MD   Primary Care Physician: Philippe Mohr MD  Principal Problem:Acute respiratory failure with hypoxia    Subjective:     HPI: Mr. Crocker is a 43 yo WM with T1DM, Hashimoto thyroiditis, h/o OHTx 11/2014, and CKD stage 4 who was admitted on 3/11/18 for persistent diarrhea. He reported a 3 week history of diarrhea up to 15x/day. Associated symptoms including URI symptoms, coughing fits, and coughing syncope. Prograf level was 14.3. His post-heart transplant course has been complicated by AMR 4/2015, CMV, post-transplant restrictive cardiomyopathy, recurrent pleural effusions/ascites requiring monthly thoracenteses/paracenteses, VATS 1/11/18 with Pleur-X catheter (now removed), and CKD stage 4 with baseline sCr 2.6-3.0. Baseline -120s and baseline BP 90s-110s/60-70s. Upon admission he was started on IVF and diarrhea workup was initiated. On 3/12 he was noted to have decreased UOP despite fluids; NS was increased to 100cc/hr. He was scheduled for a colonoscopy on 3/13 however procedure was cancelled due to hypoxia and fever. He was transferred to the ICU for closer monitoring. He continued to have oliguria overnight. Bladder scan revealed 200cc of urine but patient refused osullivan catheter placement. Wife reports that patient always has a hard time urinating again after osullivan catheters are placed/removed so he refuses them. He remained hypoxic despite FiO2 70% and ABG revealed combined respiratory and metabolic acidosis with pH 7.17. He was started on BiPAP as well as a bicarb infusion at 50cc/hr. ABG with mild improvement. AM labs revealed K 6.2 and he was shifted and given kayexalate.Trialysis catheter was placed this morning and he subsequently developed hypotension and was started on levophed. He  was intubated later this morning. Nephrology consulted for CACHORRO. All history obtained by primary team and chart review as patient was intubated/sedated on exam. Consent for dialysis obtained by patient's wife and placed in chart. Of note, both of patient's parents were on dialysis.     Interval History:   Received 1 unit pRBC yesterday. On SLED from 5p-5a with UF 300cc/hr. Tolerated well; no pressor requirement. CVP 4-6 while on RRT. No events overnight. Anxious this morning; received xanax. Resting comfortably on my exam. CVP 14. Minimal hourly intake. Minimal vent settings. ABG later this afternoon with significant acidemia; SLED restarted around 1pm.     Review of patient's allergies indicates:   Allergen Reactions    No known drug allergies      Current Facility-Administered Medications   Medication Frequency    0.9%  NaCl infusion (CRRT USE ONLY) Continuous    0.9%  NaCl infusion (for blood administration) Q24H PRN    alprazolam ODT dissolvable tablet 0.5 mg TID PRN    chlorhexidine 0.12 % solution 15 mL BID    dextrose 50% injection 12.5 g PRN    dextrose 50% injection 25 g PRN    famotidine (PF) injection 20 mg Daily    fentaNYL injection 50 mcg Q1H PRN    glucagon (human recombinant) injection 1 mg PRN    glucose chewable tablet 16 g PRN    glucose chewable tablet 24 g PRN    heparin (porcine) injection 5,000 Units Q12H    hydrocortisone sodium succinate injection 25 mg BID    insulin aspart U-100 pen 0-4 Units PRN    insulin regular (Humulin R) 100 Units in sodium chloride 0.9% 100 mL infusion Continuous    levothyroxine injection 88 mcg Daily    ondansetron disintegrating tablet 8 mg Q6H PRN    sodium chloride 0.9% flush 3 mL Q8H    tacrolimus capsule 0.5 mg BID       Objective:     Vital Signs (Most Recent):  Temp: 97.9 °F (36.6 °C) (04/02/18 1100)  Pulse: (!) 113 (04/02/18 1420)  Resp: 12 (04/02/18 1420)  BP: 134/65 (03/31/18 1400)  SpO2: 100 % (04/02/18 1420)  O2 Device (Oxygen  Therapy): ventilator (04/02/18 1232) Vital Signs (24h Range):  Temp:  [97.6 °F (36.4 °C)-97.9 °F (36.6 °C)] 97.9 °F (36.6 °C)  Pulse:  [110-126] 113  Resp:  [10-43] 12  SpO2:  [97 %-100 %] 100 %  Arterial Line BP: ()/(41-68) 97/52     Weight: 65.9 kg (145 lb 4.5 oz) (04/02/18 1100)  Body mass index is 22.75 kg/m².  Body surface area is 1.77 meters squared.    I/O last 3 completed shifts:  In: 6023.9 [I.V.:4858.9; Blood:265; IV Piggyback:900]  Out: 6205 [Other:6205]    Physical Exam   Constitutional: He appears well-developed and well-nourished.   HENT:   Head: Normocephalic and atraumatic.   Eyes: Conjunctivae and EOM are normal.   Cardiovascular: Normal rate and regular rhythm.    Pulmonary/Chest: He has no wheezes. He has no rales.   Abdominal: Soft. He exhibits no distension.   Musculoskeletal: He exhibits no edema or deformity.   Skin: Skin is warm and dry. He is not diaphoretic.       Significant Labs:  ABGs:   Recent Labs  Lab 04/02/18  1420   PH 7.355   PCO2 30.7*   HCO3 17.1*   POCSATURATED 98   BE -8     CBC:   Recent Labs  Lab 04/02/18  1037   WBC 5.02   RBC 2.56*   HGB 7.9*   HCT 25.7*      *   MCH 30.9   MCHC 30.7*     CMP:   Recent Labs  Lab 04/02/18  0314   *  138*   CALCIUM 8.3*  8.3*   ALBUMIN 2.6*  2.6*   PROT 6.3     137   K 4.9  4.9   CO2 21*  21*     105   BUN 5*  5*   CREATININE 0.7  0.7   ALKPHOS 189*   ALT 15   AST 24   BILITOT 1.1*     All labs within the past 24 hours have been reviewed.     Significant Imaging:  Labs: Reviewed  X-Ray: Reviewed    Assessment/Plan:     Acute renal failure superimposed on stage 4 chronic kidney disease    - baseline sCr 2.6-3.0 consistent with CKD stage IV  - anuric CACHORRO; likely ischemic ATN from prolonged pre-renal state (diarrhea) in setting of prograf renal vasoconstriction; cannot r/o septic ATN or Prograf toxicity  - SLED started 3/14  - remains anuric. Continues to need daily metabolic clearance with RRT  -  SLED x 12 hours last night; tolerated well. No pressor requirement. Net negative 300cc yesterday.   - planned to perform nocturnal SLED again tonight with plans to subsequently transition to QOD treatments; however patient developed combined respiratory and metabolic acidosis this afternoon. SLED restarted around 1pm.   - respiratory acidosis now resolved; remains with metabolic acidosis. Checked beta-hydroxybutyrate (given h/o starvation ketosis earlier this admission) and lactate.   - will continue SLED today and overnight and hold prior to day shift  - will reassess in AM            Amairani Alegria, PGY-5  Nephrology Fellow  [facility]  Pager: [phone]

## 2018-04-02 NOTE — PROGRESS NOTES
"[facility]  Endocrinology  Progress Note    Admit Date: 3/11/2018     Reason for Consult: abnormal TFTs    Surgical Procedure and Date: s/p heart transplant 2014     Diabetes diagnosis year: 7yo (T1DM)     Home Diabetes Medications:    Levemir 15u qHS  Novolog 4u AC     How often checking glucose at home? 4 times daily   BG readings on regimen: 150-200s  Hypoglycemia on the regimen?  Yes, rarely - treats appropriately (light snack, glucose tab)  Missed doses on regimen?  No        Diabetes Complications include:     Hyperglycemia, Hypoglycemia  and Diabetic chronic kidney disease          Complicating diabetes co morbidities:   N/A     HPI:   Patient is a 42 y.o. male with a diagnosis of T1DM, HTN, hypothyroidism, s/p heart transplant.  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 He required serial paracentesis and thoracentesis until he underwent a VATS with pleurX catheter placement on 1/11/2018 and removed 2/7/18.       Presented to hospital secondary to diarrhea and cough.  Upon initial labs, TSH was decreased (0.101) and free T4 1.33.  Endocrinology consulted to assist in management of these labs.  He was last seen in clinic by Kamala Vázquez NP 11/2017.   Previous hospitalization, endocrine consulted for same problem.  During that visit, recommended decreasing LT4 to 125mcg daily. Unfortunately, patient was discharged on previous dose of 150mcg daily.     Interval HPI:   Overnight events:  Remains trach. NPO  Possible TF vs TPN in near future   BG at goal no hypoglycemia but BG trended down on 0.5u/hr and insulin gtt turned off overnight, subsequently elevated BG this morning     /65   Pulse (!) 118   Temp 97.9 °F (36.6 °C) (Oral)   Resp 18   Ht 5' 7" (1.702 m)   Wt 65.9 kg (145 lb 4.5 oz)   SpO2 100%   BMI 22.75 kg/m²       Labs Reviewed and Include      Recent Labs  Lab " 04/02/18  0314   *  138*   CALCIUM 8.3*  8.3*   ALBUMIN 2.6*  2.6*   PROT 6.3     137   K 4.9  4.9   CO2 21*  21*     105   BUN 5*  5*   CREATININE 0.7  0.7   ALKPHOS 189*   ALT 15   AST 24   BILITOT 1.1*     Lab Results   Component Value Date    WBC 2.93 (L) 04/01/2018    HGB 8.0 (L) 04/01/2018    HCT 25.2 (L) 04/01/2018    MCV 96 04/01/2018     (L) 04/01/2018       Recent Labs  Lab 03/30/18  0217   TSH 9.412*   FREET4 0.81     Lab Results   Component Value Date    HGBA1C 6.9 (H) 02/11/2018       Nutritional status:   Body mass index is 22.75 kg/m².  Lab Results   Component Value Date    ALBUMIN 2.6 (L) 04/02/2018    ALBUMIN 2.6 (L) 04/02/2018    ALBUMIN 2.5 (L) 04/01/2018    ALBUMIN 2.5 (L) 04/01/2018     Lab Results   Component Value Date    PREALBUMIN 5 (L) 03/15/2018    PREALBUMIN 18 (L) 03/24/2015    PREALBUMIN 19 (L) 12/17/2014       Estimated Creatinine Clearance: 125.5 mL/min (based on SCr of 0.7 mg/dL).    Accu-Checks  Recent Labs      03/31/18   1549  03/31/18   2146  04/01/18   0002  04/01/18   0312  04/01/18   0815  04/01/18   1131  04/01/18   1543  04/01/18   1958  04/02/18   0028  04/02/18   0318   POCTGLUCOSE  253*  178*  161*  142*  203*  202*  225*  162*  130*  127*       Current Medications and/or Treatments Impacting Glycemic Control  Immunotherapy:  Immunosuppressants         Stop Route Frequency     tacrolimus capsule 0.5 mg      -- SL 2 times daily        Steroids:   Hormones     Start     Stop Route Frequency Ordered    03/31/18 0900  hydrocortisone sodium succinate injection 25 mg      -- IV 2 times daily 03/30/18 1711        Pressors:    Autonomic Drugs     None        Hyperglycemia/Diabetes Medications: Antihyperglycemics     Start     Stop Route Frequency Ordered    04/02/18 1019  insulin aspart U-100 pen 0-4 Units      -- SubQ As needed (PRN) 04/02/18 0919    04/02/18 0916  insulin regular (Humulin R) 100 Units in sodium chloride 0.9% 100 mL infusion       -- IV Continuous 04/02/18 0919          ASSESSMENT and PLAN    * Acute respiratory failure with hypoxia    Per primary    S/p trach        Type 1 diabetes mellitus with stage 3 chronic kidney disease    BG goal 140-1180    rewrite transition at 0.5 units/hr. POC q4 Low dose correction  Discussed with nurse, pt is T1DM, do not suspend insulin >1 hr    If BG trends down on 0.1u/hr, then will need to add dextrose.   Notify endo of TF/TPN    Discharge Recommendations:  TBD.        Current chronic use of systemic steroids    Agree with HC           Hypothyroidism due to Hashimoto's thyroiditis    Abnormal TFTs upon admit.  Unclear if secondary to illness, but with evidence of iatrogenic hyperthyroidism in past while on above weight based dose.     TSH 9 Possibly from missed doses from lack of PO access vs. Bowel edema with malabsorption, vs NTIS.  Reassuring that FT4 normal.     Continue IV LT4  88 mcg daily (which is 70% PO dose 125mcg) and would follow clinical s/s rather than TFTs solely in setting of contributing NTIS.    Recheck TFTs in 4 weeks.          Acute renal failure superimposed on stage 4 chronic kidney disease    Avoid insulin stacking          Heart transplanted    Per transplant  Avoid hypoglycemia              Palmira Dorsey MD  Endocrinology  [facility]

## 2018-04-02 NOTE — PROGRESS NOTES
"  [facility]  Adult Nutrition  Consult Note    SUMMARY     Recommendations    1. TPN recommendations: 5/20 @ 80 mL/hr + 250 mL 20% lipids to provide 2190 calories, 96 g of protein, 1920 mL fluid (GIR: 3.29).  2. TF recommendations: Glucerna 1.5 @ 60 mL/hr to provide 2160 calories, 119 g of protein, 1093 mL fluid.   - Hold for residuals >500 mL; additional fluid per MD.  3. If able to advance diet, recommend cardiac diet (texture per SLP).   4. RD to monitor & follow-up.    Goals: Meet % EEN, EPN  Nutrition Goal Status: goal not met  Communication of RD Recs: reviewed with RN    Reason for Assessment    Reason for Assessment: RD follow-up  Diagnosis: other (see comments) (Resp. fx)  Relevant Medical History: Hashimoto's disease, HTN, HLD, DM, CHF, Heart tx (2014)  Interdisciplinary Rounds: did not attend    General Information Comments: Pt very nauseous this AM, on trach collar. Per RN, team considering PICC/TPN. Pt against NGT.  Nutrition Discharge Planning: Unable to determine     Nutrition/Diet History    Patient Reported Diet/Restrictions/Preferences: other (see comments) (HAO)  Do you have any cultural, spiritual, Alevism conflicts, given your current situation?: none  Factors Affecting Nutritional Intake: NPO, nausea/vomiting    Anthropometrics    Temp: 97.9 °F (36.6 °C)  Height Method: Stated  Height: 5' 7" (170.2 cm)  Height (inches): 67 in  Weight Method: Bed Scale  Weight: 65.9 kg (145 lb 4.5 oz)  Weight (lb): 145.28 lb  Ideal Body Weight (IBW), Male: 148 lb  % Ideal Body Weight, Male (lb): 98.16 lb  BMI (Calculated): 22.8  BMI Grade: 18.5-24.9 - normal  Usual Body Weight (UBW), kg:  (HAO)    Lab/Procedures/Meds    Pertinent Labs Reviewed: reviewed  Pertinent Labs Comments: Gluc 138, A1C 6.9  Pertinent Medications Reviewed: reviewed  Pertinent Medications Comments: Insulin    Physical Findings/Assessment    Overall Physical Appearance: on oxygen therapy, underweight  Tubes: other " "(see comments) (-)  Oral/Mouth Cavity: WDL  Skin: intact    Estimated/Assessed Needs    Weight Used For Calorie Calculations: 81 kg (178 lb 9.2 oz) (Dosing wt)  Height: 5' 7" (170.2 cm)    Energy Calorie Requirements (kcal): 2073 kcal/d  Energy Need Method: [place]-St Jeor (1.25 PAL)    Protein Requirements:  g/d (1.2-1.5 g/kg)  Weight Used For Protein Calculations: 81 kg (178 lb 9.2 oz)    Fluid Need Method: other (see comments) (Per MD or 1 mL/kcal)    CHO Requirement: 50% total kcals     Nutrition Prescription Ordered    Current Diet Order: NPO  Current Nutrition Support Formula Ordered: Discontinued     Nutrition Risk    Level of Risk/Frequency of Follow-up: high     Assessment and Plan    * Acute respiratory failure with hypoxia      Nutrition Problem  Inadequate energy intake    Related to (etiology):   Inability to consume sufficient energy    Signs and Symptoms (as evidenced by):   NPO with no alternate means of nutrition     Nutrition Diagnosis Status:   Continues         Monitor and Evaluation    Food and Nutrient Intake: energy intake, food and beverage intake, enteral nutrition intake, parenteral nutrition intake  Food and Nutrient Adminstration: diet order, enteral and parenteral nutrition administration  Physical Activity and Function: nutrition-related ADLs and IADLs  Anthropometric Measurements: weight, weight change  Biochemical Data, Medical Tests and Procedures: lipid profile, inflammatory profile, glucose/endocrine profile, gastrointestinal profile, electrolyte and renal panel  Nutrition-Focused Physical Findings: overall appearance     Nutrition Follow-Up    RD Follow-up?: Yes  "

## 2018-04-02 NOTE — PLAN OF CARE
Problem: Patient Care Overview  Goal: Plan of Care Review  Outcome: Ongoing (interventions implemented as appropriate)  Pt's VSS throughout day. Pt not ventilating properly on pressure support vent setting after a PRN dose of 1mg ativan was given this morning as evidenced by poor ABG results and decreased mental status. Pt placed on AC vent setting and started on CRRT around 1300 today, follow up ABG revealed much improved results. Insulin gtt restarted per MD order. BG trending down into therapeutic range after gtt restarted. Plan of care reviewed with patient and spouse at bedside, spouse verbalized understanding of teaching. All questions addressed and answered. WCTM.

## 2018-04-02 NOTE — PROGRESS NOTES
[facility]  Pulmonology  Progress Note    Patient Name: Lv Crocker  MRN: [id number]  Admission Date: 3/11/2018  Hospital Length of Stay: 21 days  Code Status: Full Code  Attending Provider: Heriberto Rosa MD  Primary Care Provider: Philippe Mohr MD   Principal Problem: Acute respiratory failure with hypoxia    Subjective:     Interval History:     43 y/o Male with h/o Heart transplant, restrictive cardiomyopathy who initially presented to hospital for diarrhea. Over prolonged hospital stay, he developed respiratory failure likely secondary to RSV infection, failure to wean from MV lead to tracheostomy. His hospital course has also been complicated by renal failure now requiring SLED.     We are following patient for vent management.  Early this morning patient had been doing well on Pressure support and the day before was on trach collar.  However this AM he was anxious and was given benzodiazepines and had acute alteration in his mental status, less responsive afterwards, and an ABG noted some hypercapnia in addition to acidosis.  He was also more hypotensive after moving.      Objective:     Vital Signs (Most Recent):  Temp: 97.9 °F (36.6 °C) (04/02/18 1100)  Pulse: 110 (04/02/18 1232)  Resp: (!) 22 (04/02/18 1232)  BP: 134/65 (03/31/18 1400)  SpO2: 100 % (04/02/18 1232) Vital Signs (24h Range):  Temp:  [97.6 °F (36.4 °C)-97.9 °F (36.6 °C)] 97.9 °F (36.6 °C)  Pulse:  [110-126] 110  Resp:  [10-43] 22  SpO2:  [97 %-100 %] 100 %  Arterial Line BP: ()/(41-68) 97/52     Weight: 65.9 kg (145 lb 4.5 oz)  Body mass index is 22.75 kg/m².      Intake/Output Summary (Last 24 hours) at 04/02/18 1248  Last data filed at 04/02/18 1200   Gross per 24 hour   Intake          3085.72 ml   Output             3670 ml   Net          -584.28 ml       Physical Exam       Gen: weak, fatigued appearing patient. On ventilator. Less responsive   HEENT: trach in place. Right IJ trialysis cath in  place.   Chest: bilateral breath sounds noted.    Heart: Regular tachycardia.    Abdomen: soft, non tender, no masses. No g/r/r  Extremities: pitting edema. No cyanosis. No deformities.       Vents:  Vent Mode: A/C (04/02/18 1232)  Ventilator Initiated: Yes (03/14/18 0932)  Set Rate: 22 bmp (04/02/18 1232)  Vt Set: 420 mL (04/02/18 1232)  Pressure Support: 5 cmH20 (04/02/18 1039)  PEEP/CPAP: 5 cmH20 (04/02/18 1232)  Oxygen Concentration (%): 25 (04/02/18 1232)  Peak Airway Pressure: 33 cmH2O (04/02/18 1232)  Plateau Pressure: 20 cmH20 (03/29/18 0313)  Total Ve: 9.52 mL (04/02/18 1232)  F/VT Ratio<105 (RSBI): (!) 50.81 (04/02/18 1232)    Lines/Drains/Airways     Central Venous Catheter Line                 Trialysis (Dialysis) Catheter 03/23/18 1433 right internal jugular 9 days          Airway                 Surgical Airway 03/28/18 1625 Shiley 4 days          Arterial Line                 Arterial Line 03/14/18 1600 Right Femoral 18 days          Peripheral Intravenous Line                 Peripheral IV - Single Lumen 03/20/18 1640 Right Forearm 12 days                Significant Labs:    CBC/Anemia Profile:    Recent Labs  Lab 04/01/18  1124 04/01/18  1954 04/02/18  1037   WBC 2.82* 2.93* 5.02   HGB 8.1* 8.0* 7.9*   HCT 25.3* 25.2* 25.7*   * 124* 150   MCV 97 96 100*   RDW 16.9* 16.7* 16.9*        Chemistries:    Recent Labs  Lab 04/01/18  0313 04/01/18  2147 04/02/18  0314     139 138  138 137  137   K 4.8  4.8 4.8  4.8 4.9  4.9     107 106  106 105  105   CO2 22*  22* 22*  22* 21*  21*   BUN 5*  5* 7  7 5*  5*   CREATININE 0.6  0.6 0.7  0.7 0.7  0.7   CALCIUM 8.0*  8.0* 8.1*  8.1* 8.3*  8.3*   ALBUMIN 2.5*  2.5* 2.5*  2.5* 2.6*  2.6*   PROT 6.0  --  6.3   BILITOT 1.3*  --  1.1*   ALKPHOS 185*  --  189*   ALT 15  --  15   AST 25  --  24   MG 1.7 1.8  1.8 1.8   PHOS 1.5* 1.8*  1.8* 1.9*       All pertinent labs within the past 24 hours have been  reviewed.    Significant Imaging:  I have reviewed and interpreted all pertinent imaging results/findings within the past 24 hours.    Assessment/Plan:     * Acute respiratory failure with hypoxia    PT/OT  Trach collar during the day and can be on then vent at night.  Today however will have him on Assist control due to his hypercapnia and acidosis.  Transition back to trach collar either later today or tomorrow, depending on how he improves.          Pneumonia    Patient noted to have RSV as the source of pneumonia.  Slowly improving but prfoundly weak.  Will need to work to improve functional status, including PT/OT and continued vent support weaning.  When more awake can transition back to trach collar.              Acute renal failure superimposed on stage 4 chronic kidney disease    May be planned for more dialysis today.  Appears to have a volume neutral status grossly.  Appreciate Nephro assistance.          Anxiety    Recommend that he be treated with less sedating medications.  He certainly will have some anxiety and stress and may benefit from more long acting medications with less sedation and can help with sleep wake cycle.  Ramelteon could be considered or an SSRI.  Would avoid benzos in this patient.               Simone Benitez MD  Pulmonology  [facility]

## 2018-04-02 NOTE — SUBJECTIVE & OBJECTIVE
Interval History:   Received 1 unit pRBC yesterday. On SLED from 5p-5a with UF 300cc/hr. Tolerated well; no pressor requirement. CVP 4-6 while on RRT. No events overnight. Anxious this morning; received xanax. Resting comfortably on my exam. CVP 14. Minimal hourly intake. Minimal vent settings. ABG later this afternoon with significant acidemia; SLED restarted around 1pm.     Review of patient's allergies indicates:   Allergen Reactions    No known drug allergies      Current Facility-Administered Medications   Medication Frequency    0.9%  NaCl infusion (CRRT USE ONLY) Continuous    0.9%  NaCl infusion (for blood administration) Q24H PRN    alprazolam ODT dissolvable tablet 0.5 mg TID PRN    chlorhexidine 0.12 % solution 15 mL BID    dextrose 50% injection 12.5 g PRN    dextrose 50% injection 25 g PRN    famotidine (PF) injection 20 mg Daily    fentaNYL injection 50 mcg Q1H PRN    glucagon (human recombinant) injection 1 mg PRN    glucose chewable tablet 16 g PRN    glucose chewable tablet 24 g PRN    heparin (porcine) injection 5,000 Units Q12H    hydrocortisone sodium succinate injection 25 mg BID    insulin aspart U-100 pen 0-4 Units PRN    insulin regular (Humulin R) 100 Units in sodium chloride 0.9% 100 mL infusion Continuous    levothyroxine injection 88 mcg Daily    ondansetron disintegrating tablet 8 mg Q6H PRN    sodium chloride 0.9% flush 3 mL Q8H    tacrolimus capsule 0.5 mg BID       Objective:     Vital Signs (Most Recent):  Temp: 97.9 °F (36.6 °C) (04/02/18 1100)  Pulse: (!) 113 (04/02/18 1420)  Resp: 12 (04/02/18 1420)  BP: 134/65 (03/31/18 1400)  SpO2: 100 % (04/02/18 1420)  O2 Device (Oxygen Therapy): ventilator (04/02/18 1232) Vital Signs (24h Range):  Temp:  [97.6 °F (36.4 °C)-97.9 °F (36.6 °C)] 97.9 °F (36.6 °C)  Pulse:  [110-126] 113  Resp:  [10-43] 12  SpO2:  [97 %-100 %] 100 %  Arterial Line BP: ()/(41-68) 97/52     Weight: 65.9 kg (145 lb 4.5 oz) (04/02/18  1100)  Body mass index is 22.75 kg/m².  Body surface area is 1.77 meters squared.    I/O last 3 completed shifts:  In: 6023.9 [I.V.:4858.9; Blood:265; IV Piggyback:900]  Out: 6205 [Other:6205]    Physical Exam   Constitutional: He appears well-developed and well-nourished.   HENT:   Head: Normocephalic and atraumatic.   Eyes: Conjunctivae and EOM are normal.   Cardiovascular: Normal rate and regular rhythm.    Pulmonary/Chest: He has no wheezes. He has no rales.   Abdominal: Soft. He exhibits no distension.   Musculoskeletal: He exhibits no edema or deformity.   Skin: Skin is warm and dry. He is not diaphoretic.       Significant Labs:  ABGs:   Recent Labs  Lab 04/02/18  1420   PH 7.355   PCO2 30.7*   HCO3 17.1*   POCSATURATED 98   BE -8     CBC:   Recent Labs  Lab 04/02/18  1037   WBC 5.02   RBC 2.56*   HGB 7.9*   HCT 25.7*      *   MCH 30.9   MCHC 30.7*     CMP:   Recent Labs  Lab 04/02/18  0314   *  138*   CALCIUM 8.3*  8.3*   ALBUMIN 2.6*  2.6*   PROT 6.3     137   K 4.9  4.9   CO2 21*  21*     105   BUN 5*  5*   CREATININE 0.7  0.7   ALKPHOS 189*   ALT 15   AST 24   BILITOT 1.1*     All labs within the past 24 hours have been reviewed.     Significant Imaging:  Labs: Reviewed  X-Ray: Reviewed

## 2018-04-02 NOTE — PROGRESS NOTES
UPDATE    SW to pt's room for update. Pt alert and seems to be responding appropriately to questions. Pt's wife at bedside and states pt has been more interactive. Pt's wife requesting paperwork be completed to allow pt's dog to visit. SW called volunteer services and confirmed they have all necessary paperwork on file. Pt's wife reports coping adequately with no other needs at this time. SW providing psychosocial and counseling support, education, resources, and d/c planning as needed. SW continuing to follow and remains available.

## 2018-04-02 NOTE — ASSESSMENT & PLAN NOTE
- TTE 3/26/18 showed normal graft function but has known history of restrictive CM post transplant.  - Continue hydrocortisone 25mg BID for now.  - Continue Tacro .5mg BID (sublingual) until enteral acces regained.  - Cellcept remains on hold.

## 2018-04-02 NOTE — ASSESSMENT & PLAN NOTE
- baseline sCr 2.6-3.0 consistent with CKD stage IV  - anuric CACHORRO; likely ischemic ATN from prolonged pre-renal state (diarrhea) in setting of prograf renal vasoconstriction; cannot r/o septic ATN or Prograf toxicity  - SLED started 3/14  - remains anuric. Continues to need daily metabolic clearance with RRT  - SLED x 12 hours last night; tolerated well. No pressor requirement. Net negative 300cc yesterday.   - planned to perform nocturnal SLED again tonight with plans to subsequently transition to QOD treatments; however patient developed combined respiratory and metabolic acidosis this afternoon. SLED restarted around 1pm.   - respiratory acidosis now resolved; remains with metabolic acidosis. Checked beta-hydroxybutyrate (given h/o starvation ketosis earlier this admission) and lactate.   - will continue SLED today and overnight and hold prior to day shift  - will reassess in AM

## 2018-04-02 NOTE — SUBJECTIVE & OBJECTIVE
Interval History:     43 y/o Male with h/o Heart transplant, restrictive cardiomyopathy who initially presented to hospital for diarrhea. Over prolonged hospital stay, he developed respiratory failure likely secondary to RSV infection, failure to wean from MV lead to tracheostomy. His hospital course has also been complicated by renal failure now requiring SLED.     We are following patient for vent management.  Early this morning patient had been doing well on Pressure support and the day before was on trach collar.  However this AM he was anxious and was given benzodiazepines and had acute alteration in his mental status, less responsive afterwards, and an ABG noted some hypercapnia in addition to acidosis.  He was also more hypotensive after moving.      Objective:     Vital Signs (Most Recent):  Temp: 97.9 °F (36.6 °C) (04/02/18 1100)  Pulse: 110 (04/02/18 1232)  Resp: (!) 22 (04/02/18 1232)  BP: 134/65 (03/31/18 1400)  SpO2: 100 % (04/02/18 1232) Vital Signs (24h Range):  Temp:  [97.6 °F (36.4 °C)-97.9 °F (36.6 °C)] 97.9 °F (36.6 °C)  Pulse:  [110-126] 110  Resp:  [10-43] 22  SpO2:  [97 %-100 %] 100 %  Arterial Line BP: ()/(41-68) 97/52     Weight: 65.9 kg (145 lb 4.5 oz)  Body mass index is 22.75 kg/m².      Intake/Output Summary (Last 24 hours) at 04/02/18 1248  Last data filed at 04/02/18 1200   Gross per 24 hour   Intake          3085.72 ml   Output             3670 ml   Net          -584.28 ml       Physical Exam       Gen: weak, fatigued appearing patient. On ventilator. Less responsive   HEENT: trach in place. Right IJ trialysis cath in place.   Chest: bilateral breath sounds noted.    Heart: Regular tachycardia.    Abdomen: soft, non tender, no masses. No g/r/r  Extremities: pitting edema. No cyanosis. No deformities.       Vents:  Vent Mode: A/C (04/02/18 1232)  Ventilator Initiated: Yes (03/14/18 0932)  Set Rate: 22 bmp (04/02/18 1232)  Vt Set: 420 mL (04/02/18 1232)  Pressure Support: 5 cmH20  (04/02/18 1039)  PEEP/CPAP: 5 cmH20 (04/02/18 1232)  Oxygen Concentration (%): 25 (04/02/18 1232)  Peak Airway Pressure: 33 cmH2O (04/02/18 1232)  Plateau Pressure: 20 cmH20 (03/29/18 0313)  Total Ve: 9.52 mL (04/02/18 1232)  F/VT Ratio<105 (RSBI): (!) 50.81 (04/02/18 1232)    Lines/Drains/Airways     Central Venous Catheter Line                 Trialysis (Dialysis) Catheter 03/23/18 1433 right internal jugular 9 days          Airway                 Surgical Airway 03/28/18 1625 Shiley 4 days          Arterial Line                 Arterial Line 03/14/18 1600 Right Femoral 18 days          Peripheral Intravenous Line                 Peripheral IV - Single Lumen 03/20/18 1640 Right Forearm 12 days                Significant Labs:    CBC/Anemia Profile:    Recent Labs  Lab 04/01/18  1124 04/01/18  1954 04/02/18  1037   WBC 2.82* 2.93* 5.02   HGB 8.1* 8.0* 7.9*   HCT 25.3* 25.2* 25.7*   * 124* 150   MCV 97 96 100*   RDW 16.9* 16.7* 16.9*        Chemistries:    Recent Labs  Lab 04/01/18  0313 04/01/18 2147 04/02/18 0314     139 138  138 137  137   K 4.8  4.8 4.8  4.8 4.9  4.9     107 106  106 105  105   CO2 22*  22* 22*  22* 21*  21*   BUN 5*  5* 7  7 5*  5*   CREATININE 0.6  0.6 0.7  0.7 0.7  0.7   CALCIUM 8.0*  8.0* 8.1*  8.1* 8.3*  8.3*   ALBUMIN 2.5*  2.5* 2.5*  2.5* 2.6*  2.6*   PROT 6.0  --  6.3   BILITOT 1.3*  --  1.1*   ALKPHOS 185*  --  189*   ALT 15  --  15   AST 25  --  24   MG 1.7 1.8  1.8 1.8   PHOS 1.5* 1.8*  1.8* 1.9*       All pertinent labs within the past 24 hours have been reviewed.    Significant Imaging:  I have reviewed and interpreted all pertinent imaging results/findings within the past 24 hours.

## 2018-04-02 NOTE — NURSING
Rounds Report: Attended interdisciplinary rounds this afternoon with the transplant team including SW, physicians, fellows,  mid-level providers, and transplant coordinators.  Discussed plan of care which includes trach collar intermittent with vent at night. He is slowly improving but quite weak and debilitated. The team will be discussing nutrition options  - peg tube, feeding tube, tpn if he continues to not meet nutritional goals.

## 2018-04-02 NOTE — ASSESSMENT & PLAN NOTE
PT/OT  Trach collar during the day and can be on then vent at night.  Today however will have him on Assist control due to his hypercapnia and acidosis.  Transition back to trach collar either later today or tomorrow, depending on how he improves.

## 2018-04-02 NOTE — ASSESSMENT & PLAN NOTE
Recommend that he be treated with less sedating medications.  He certainly will have some anxiety and stress and may benefit from more long acting medications with less sedation and can help with sleep wake cycle.  Ramelteon could be considered or an SSRI.  Would avoid benzos in this patient.

## 2018-04-02 NOTE — ASSESSMENT & PLAN NOTE
May be planned for more dialysis today.  Appears to have a volume neutral status grossly.  Appreciate Nephro assistance.

## 2018-04-02 NOTE — ASSESSMENT & PLAN NOTE
Patient noted to have RSV as the source of pneumonia.  Slowly improving but prfoundly weak.  Will need to work to improve functional status, including PT/OT and continued vent support weaning.  When more awake can transition back to trach collar.

## 2018-04-02 NOTE — PLAN OF CARE
Problem: Patient Care Overview  Goal: Plan of Care Review  Outcome: Ongoing (interventions implemented as appropriate)  NAEON. See flowsheets for vital signs and assessments. See below for updates on progress made.       Neuro: AA and mouths what he wants to say; follows commands, moves extremities; didn't sleep well    Cardiovascular: ST 110s-120s; -130; CVPs 1-2      Pulmonary: trach collar 8L/35% while awake; vent at 25% @HS    Gastrointestinal: no BM; +BS    Genitourinary: anuric    Endocrine: 12 hr CRRT tx finished at 0513; K 4.9, mag 1.8 this AM; accuchecks q4hr with PRN SS coverage    Integumentary/other: toes and fingers discolored    Infusions: insulin gtt stopped at 0316 per order criteria    POC: decide with pt and wife PEG/TF vs PICC/TPN    Patient progressing towards goals as tolerated, plan of care communicated and reviewed with Lv Crocker and wife Elizabeth. All concerns addressed. Will continue to monitor.

## 2018-04-02 NOTE — ASSESSMENT & PLAN NOTE
Abnormal TFTs upon admit.  Unclear if secondary to illness, but with evidence of iatrogenic hyperthyroidism in past while on above weight based dose.     TSH 9 Possibly from missed doses from lack of PO access vs. Bowel edema with malabsorption, vs NTIS.  Reassuring that FT4 normal.     Continue IV LT4  88 mcg daily (which is 70% PO dose 125mcg) and would follow clinical s/s rather than TFTs solely in setting of contributing NTIS.    Recheck TFTs in 4 weeks.

## 2018-04-02 NOTE — SUBJECTIVE & OBJECTIVE
"Interval HPI:   Overnight events:  Remains trach. NPO  Possible TF vs TPN in near future   BG at goal no hypoglycemia but BG trended down on 0.5u/hr and insulin gtt turned off overnight, subsequently elevated BG this morning     /65   Pulse (!) 118   Temp 97.9 °F (36.6 °C) (Oral)   Resp 18   Ht 5' 7" (1.702 m)   Wt 65.9 kg (145 lb 4.5 oz)   SpO2 100%   BMI 22.75 kg/m²     Labs Reviewed and Include      Recent Labs  Lab 04/02/18  0314   *  138*   CALCIUM 8.3*  8.3*   ALBUMIN 2.6*  2.6*   PROT 6.3     137   K 4.9  4.9   CO2 21*  21*     105   BUN 5*  5*   CREATININE 0.7  0.7   ALKPHOS 189*   ALT 15   AST 24   BILITOT 1.1*     Lab Results   Component Value Date    WBC 2.93 (L) 04/01/2018    HGB 8.0 (L) 04/01/2018    HCT 25.2 (L) 04/01/2018    MCV 96 04/01/2018     (L) 04/01/2018       Recent Labs  Lab 03/30/18  0217   TSH 9.412*   FREET4 0.81     Lab Results   Component Value Date    HGBA1C 6.9 (H) 02/11/2018       Nutritional status:   Body mass index is 22.75 kg/m².  Lab Results   Component Value Date    ALBUMIN 2.6 (L) 04/02/2018    ALBUMIN 2.6 (L) 04/02/2018    ALBUMIN 2.5 (L) 04/01/2018    ALBUMIN 2.5 (L) 04/01/2018     Lab Results   Component Value Date    PREALBUMIN 5 (L) 03/15/2018    PREALBUMIN 18 (L) 03/24/2015    PREALBUMIN 19 (L) 12/17/2014       Estimated Creatinine Clearance: 125.5 mL/min (based on SCr of 0.7 mg/dL).    Accu-Checks  Recent Labs      03/31/18   1549  03/31/18   2146  04/01/18   0002  04/01/18   0312  04/01/18   0815  04/01/18   1131  04/01/18   1543  04/01/18   1958  04/02/18   0028  04/02/18   0318   POCTGLUCOSE  253*  178*  161*  142*  203*  202*  225*  162*  130*  127*       Current Medications and/or Treatments Impacting Glycemic Control  Immunotherapy:  Immunosuppressants         Stop Route Frequency     tacrolimus capsule 0.5 mg      -- SL 2 times daily        Steroids:   Hormones     Start     Stop Route Frequency Ordered    03/31/18 " 0900  hydrocortisone sodium succinate injection 25 mg      -- IV 2 times daily 03/30/18 1711        Pressors:    Autonomic Drugs     None        Hyperglycemia/Diabetes Medications: Antihyperglycemics     Start     Stop Route Frequency Ordered    04/02/18 1019  insulin aspart U-100 pen 0-4 Units      -- SubQ As needed (PRN) 04/02/18 0919 04/02/18 0916  insulin regular (Humulin R) 100 Units in sodium chloride 0.9% 100 mL infusion      -- IV Continuous 04/02/18 0919

## 2018-04-03 NOTE — SUBJECTIVE & OBJECTIVE
"Interval HPI:   Overnight events:  Remains trach. NPO.   Possible PEG in near future   BG at goal no hypoglycemia gtt stepped down from 0.5 to 0.3u/hr  Elevated BHOB 4 noted    BP (!) 93/51   Pulse (!) 117   Temp 97.8 °F (36.6 °C) (Oral)   Resp (!) 22   Ht 5' 7" (1.702 m)   Wt 62.2 kg (137 lb 2 oz)   SpO2 100%   BMI 21.48 kg/m²     Labs Reviewed and Include      Recent Labs  Lab 04/03/18  0324   *  111*   CALCIUM 7.9*  7.9*   ALBUMIN 2.4*  2.4*   PROT 5.5*     139   K 4.3  4.3   CO2 22*  22*     107   BUN 4*  4*   CREATININE 0.4*  0.4*   ALKPHOS 166*   ALT 14   AST 23   BILITOT 1.2*     Lab Results   Component Value Date    WBC 3.27 (L) 04/03/2018    HGB 7.2 (L) 04/03/2018    HCT 22.2 (L) 04/03/2018    MCV 97 04/03/2018     (L) 04/03/2018       Recent Labs  Lab 03/30/18  0217   TSH 9.412*   FREET4 0.81     Lab Results   Component Value Date    HGBA1C 6.9 (H) 02/11/2018       Nutritional status:   Body mass index is 21.48 kg/m².  Lab Results   Component Value Date    ALBUMIN 2.4 (L) 04/03/2018    ALBUMIN 2.4 (L) 04/03/2018    ALBUMIN 2.4 (L) 04/02/2018     Lab Results   Component Value Date    PREALBUMIN 5 (L) 03/15/2018    PREALBUMIN 18 (L) 03/24/2015    PREALBUMIN 19 (L) 12/17/2014       Estimated Creatinine Clearance: 207.3 mL/min (A) (based on SCr of 0.4 mg/dL (L)).    Accu-Checks  Recent Labs      04/01/18   1958  04/02/18   0028  04/02/18   0318  04/02/18   0854  04/02/18   1148  04/02/18   1510  04/02/18   2051  04/03/18   0059  04/03/18   0328  04/03/18   0910   POCTGLUCOSE  162*  130*  127*  228*  278*  164*  117*  118*  112*  115*       Current Medications and/or Treatments Impacting Glycemic Control  Immunotherapy:  Immunosuppressants         Stop Route Frequency     tacrolimus capsule 0.5 mg      -- SL 2 times daily        Steroids:   Hormones     Start     Stop Route Frequency Ordered    03/31/18 0900  hydrocortisone sodium succinate injection 25 mg      -- IV 2 " times daily 03/30/18 1711        Pressors:    Autonomic Drugs     None        Hyperglycemia/Diabetes Medications: Antihyperglycemics     Start     Stop Route Frequency Ordered    04/02/18 1019  insulin aspart U-100 pen 0-4 Units      -- SubQ As needed (PRN) 04/02/18 0919    04/02/18 0916  insulin regular (Humulin R) 100 Units in sodium chloride 0.9% 100 mL infusion      -- IV Continuous 04/02/18 0919

## 2018-04-03 NOTE — CONSULTS
[facility]  General Surgery  Consult Note    Patient Name: Lv Crocker  MRN: [id number]  Code Status: Full Code  Admission Date: 3/11/2018  Hospital Length of Stay: 22 days  Attending Physician: Heriberto Rosa MD  Primary Care Provider: Philippe Mohr MD    Patient information was obtained from spouse/SO and past medical records.     Inpatient consult to General Surgery  Consult performed by: OSORIO SANTANA  Consult ordered by: JULIA CHRISTINE  Reason for consult: PEG, perm-a-cath        Subjective:     Principal Problem: Acute respiratory failure with hypoxia    History of Present Illness: Lv Crocker is a 42 year old male with PMH of OHTX in November 2014, Hashimoto's thyroiditis, T1DM, CKD and history of recurrent acsites and pleural effusions now admitted for respiratory failure from severe RSV. He underwent a right VATS drainage of effusion and Pleurx placement on 1/11/18 by Dr. James. Pleurx was removed in clinic on 2/7/18 due to minimal output and potential clogging. Recently admitted on 3/13/18 after he was found to be febrile and hypoxic in endoscopy lab.  Intubated on 3/14/18 for hypoxemic and hypercapnic resp failure. Chest CT at that time showed small right pleural effusion and bilateral subsegmental multifocal consolidation with air bronchograms more extensive in left lung. Since then he has had a complicated hospital stay for presumed severe RSV including acute kidney failure on CRRT, intermittent pressor requirement, DIC and several failed SBTs.    PSH significant for OHTx in 2014 and cholecystectomy. Prior to right pleurx placement, he was getting monthly paracenteses and thoracentesis.    No current facility-administered medications on file prior to encounter.      Current Outpatient Prescriptions on File Prior to Encounter   Medication Sig    aspirin (ECOTRIN) 81 MG EC tablet Take 1 tablet (81 mg total) by mouth once daily.    escitalopram  "oxalate (LEXAPRO) 20 MG tablet Take 1 tablet (20 mg total) by mouth once daily.    gabapentin (NEURONTIN) 300 MG capsule Take 3 capsules (900 mg total) by mouth 3 (three) times daily.    insulin aspart (NOVOLOG) 100 unit/mL InPn pen Inject 4 Units into the skin 3 (three) times daily.    insulin detemir (LEVEMIR FLEXTOUCH) 100 unit/mL (3 mL) SubQ InPn pen Inject 15 Units into the skin every evening. (Patient taking differently: Inject 15 Units into the skin 2 (two) times daily. )    lancets Misc 1 Device by Misc.(Non-Drug; Combo Route) route 4 (four) times daily with meals and nightly.    levothyroxine (SYNTHROID) 150 MCG tablet Take 1 tablet (150 mcg total) by mouth every morning.    magnesium oxide (MAG-OX) 400 mg tablet Take 1 tablet (400 mg total) by mouth once daily.    mycophenolate (MYFORTIC) 360 MG TbEC Take 2 tablets (720 mg total) by mouth 2 (two) times daily.    nortriptyline (PAMELOR) 25 MG capsule Take 1 capsule (25 mg total) by mouth every evening.    ondansetron (ZOFRAN-ODT) 4 MG TbDL Take 2 tablets (8 mg total) by mouth every 8 (eight) hours as needed (nausea).    pantoprazole (PROTONIX) 40 MG tablet TAKE ONE TABLET BY MOUTH EVERY DAY    pravastatin (PRAVACHOL) 40 MG tablet Take 1 tablet (40 mg total) by mouth every evening. (Patient taking differently: Take 40 mg by mouth once daily. )    predniSONE (DELTASONE) 2.5 MG tablet Take 1 tablet (2.5 mg total) by mouth once daily. S/P Heart Transplant 11/18/2014 ICD-10 Z94.1    tacrolimus (PROGRAF) 1 MG Cap Taked 3mg orally in the morning and 3mg orally in the evening. S/p heart transplant  11/18/2014  ICD-10 Z94.1    tamsulosin (FLOMAX) 0.4 mg Cp24 TAKE 2 CAPSULES(0.8 MG) BY MOUTH EVERY EVENING    torsemide (DEMADEX) 20 MG Tab Take 2 tablets (40 mg total) by mouth once daily.    pen needle, diabetic 32 gauge x 5/32" Ndle Uses 5 pen needles a day       Review of patient's allergies indicates:   Allergen Reactions    No known drug allergies  " "      Past Medical History:   Diagnosis Date    Arthritis     Ascites     Thought to be 2/2 heart tpx; liver bx 3/31/17 w/o significant fibrosis    Cardiomyopathy     Depression     Controlled "for the most part" on Lexipro    Encounter for blood transfusion     during transplant    GERD (gastroesophageal reflux disease)     Hashimoto's disease     History of CHF (congestive heart failure)     Previously EF 20% (prior to heart transplant); last EF 60%, PAP 41 as of 12/12/17; throught to be 2/2 genetics & DM1    History of coronary artery disease     H/o Coronary artery disease; resolved since heart transplant 2014    History of myocardial infarction     h/o MI x3 prior to heart transplant    HLD (hyperlipidemia) 6/11/2015    Hypoglycemia unawareness in type 1 diabetes mellitus     Hypothyroid     Initially hyperthyroid 2/2 Hoshimoto's thyroiditis; now on levothyroxine 150 mcg qd    Pleural effusion due to another disorder     Thought to be 2/2 heart tpx; s/p PleuRx catheter placement and removal after 1-2 months    Pulmonary hypertension assoc with unclear multi-factorial mechanisms 12/12/2017    PAP 41 (EF 60%) on ECHO 12/12/17    Syncope 6/30/2015    Type I diabetes mellitus, well controlled     Well controlled; Dx'd @5y/o; on N insulin 20U BID & Humalog 10U w/meals +SSI; Glu 89 11/9/17; A1c 7.2% 4/5/17    Unspecified essential hypertension 05/29/2014    Well controlled; Last /57 on 11/9/17     Past Surgical History:   Procedure Laterality Date    CARDIAC CATHETERIZATION      CARDIAC SURGERY      CHOLECYSTECTOMY      COLONOSCOPY N/A 3/13/2018    Procedure: COLONOSCOPY;  Surgeon: Tim Spencer MD;  Location: [facility] ([address] [place] [address]);  Service: Endoscopy;  Laterality: N/A;    CORONARY ANGIOPLASTY      FOOT SPLIT TRANSFER OF THE POSTERIOR TIBIALIS TENDON PROCEDURE      HEART TRANSPLANT  2014    KNEE SURGERY      PleuRx catheter placement  01/11/2018    Plan for removal after 1-2 months " by Dr. James in cardiothoracic surgery    s/p oht  November 2014    stent placemnet       Family History     Problem Relation (Age of Onset)    Cancer Brother (50)    Diabetes Mother, Father, Brother, Son, Sister, Maternal Uncle, Paternal Aunt, Maternal Grandmother, Maternal Grandfather    Heart disease Mother, Brother    Hypertension Mother, Father, Brother    Kidney disease Mother, Father    Stroke Father, Brother    Vision loss Brother        Social History Main Topics    Smoking status: Never Smoker    Smokeless tobacco: Never Used    Alcohol use No    Drug use: No    Sexual activity: Yes     Partners: Female     Review of Systems   Reason unable to perform ROS: trach in place.     Objective:     Vital Signs (Most Recent):  Temp: 98.1 °F (36.7 °C) (04/03/18 1100)  Pulse: (!) 114 (04/03/18 1533)  Resp: 20 (04/03/18 1533)  BP: 108/66 (04/03/18 1533)  SpO2: 100 % (04/03/18 1533) Vital Signs (24h Range):  Temp:  [97.7 °F (36.5 °C)-98.1 °F (36.7 °C)] 98.1 °F (36.7 °C)  Pulse:  [104-122] 114  Resp:  [0-29] 20  SpO2:  [97 %-100 %] 100 %  BP: ()/(51-66) 108/66  Arterial Line BP: ()/(39-63) 82/40     Weight: 62.2 kg (137 lb 2 oz)  Body mass index is 21.48 kg/m².    Physical Exam   Constitutional: He appears well-developed and well-nourished. No distress.   HENT:   Head: Normocephalic and atraumatic.   Eyes: EOM are normal. No scleral icterus.   Neck: Normal range of motion.   Trach in place    Cardiovascular:   tachycardic   Pulmonary/Chest: Effort normal. No respiratory distress.   Abdominal:   Soft, NT, previous chest tube scars noted  No distension, no fluid wave   Musculoskeletal: He exhibits no edema or tenderness.   Neurological: He is alert.   Skin: Skin is warm and dry.       Significant Labs:  CBC:   Recent Labs  Lab 04/03/18  0859   WBC 3.27*   RBC 2.30*   HGB 7.2*   HCT 22.2*   *   MCV 97   MCH 31.3*   MCHC 32.4     BMP:   Recent Labs  Lab 04/03/18  1428   *      K  4.6      CO2 16*   BUN 11   CREATININE 0.9   CALCIUM 8.1*   MG 2.3     CMP:   Recent Labs  Lab 04/03/18  0324 04/03/18  1428   *  111* 189*   CALCIUM 7.9*  7.9* 8.1*   ALBUMIN 2.4*  2.4* 2.4*   PROT 5.5*  --      139 138   K 4.3  4.3 4.6   CO2 22*  22* 16*     107 106   BUN 4*  4* 11   CREATININE 0.4*  0.4* 0.9   ALKPHOS 166*  --    ALT 14  --    AST 23  --    BILITOT 1.2*  --      LFTs:   Recent Labs  Lab 04/03/18  0324 04/03/18  1428   ALT 14  --    AST 23  --    ALKPHOS 166*  --    BILITOT 1.2*  --    PROT 5.5*  --    ALBUMIN 2.4*  2.4* 2.4*     ABGs:   Recent Labs  Lab 04/03/18  0333   PH 7.415   PCO2 30.1*   PO2 128*   HCO3 19.3*   POCSATURATED 99   BE -5     Assessment/Plan:     Acute renal failure superimposed on stage 4 chronic kidney disease    43 yo male w extensive past medical history, including dysphagia and acute renal failure    -plan for perm-a-cath, possible open g vs PEG in OR tomorrow 4/4/18  -NPO/hold tube feeds p MN 4/4/18  -consents to be obtained           VTE Risk Mitigation         Ordered     heparin (porcine) injection 5,000 Units  Every 12 hours     Route:  Subcutaneous        03/23/18 1611          Thank you for your consult. I will follow-up with patient. Please contact us if you have any additional questions.    Alexandrea Badillo PA-C   [id number]  General Surgery  [facility]     I have personally performed a detailed history and physical examination on this patient. My findings are summarized in the resident's note included in the record.

## 2018-04-03 NOTE — PROGRESS NOTES
UPDATE    SW to pt's room for update. Pt sleeping at this time. Wife Elizabeth at bedside. Pt's wife reports coping adequately with no needs at this time. Per HTS rounds, pt will require LTAC placement. Pt's wife reports preference for [facility] ph [phone], fax [phone] in [place], [place]. SW providing psychosocial and counseling support, education, resources, and d/c planning as needed. SW continuing to follow and remains available.

## 2018-04-03 NOTE — ASSESSMENT & PLAN NOTE
JOSE, and endo is following will likely need more SLED for metabolic acidosis and his poor ability to compensate.

## 2018-04-03 NOTE — ASSESSMENT & PLAN NOTE
- baseline sCr 2.6-3.0 consistent with CKD stage IV  - anuric CACHORRO; likely ischemic ATN from prolonged pre-renal state (diarrhea) in setting of prograf renal vasoconstriction; cannot r/o septic ATN or Prograf toxicity  - SLED started 3/14  - remains anuric. Continues to need daily metabolic clearance with RRT  - received ~18 hours SLED yesterday for metabolic acidosis. Acidemia resolved. Net +300cc yesterday.   - agree with surgery consult for tunneled HD catheter  - will hold today. Will plan for nocturnal SLED tonight with 3K/35HCO3. Hopefully if electrolytes look good tomorrow, can hold RRT tomorrow and transition to QOD schedule.

## 2018-04-03 NOTE — PROGRESS NOTES
Notified NETTIE Dumont regarding decrease in BP art line 70s/30s, cuff pressure 71/46. Patient otherwise asymptomatic, received 50mcg of fentanyl for pain. Will continue to monitor.

## 2018-04-03 NOTE — PROGRESS NOTES
[facility]  Nephrology  Progress Note    Patient Name: Lv Crocker  MRN: [id number]  Admission Date: 3/11/2018  Hospital Length of Stay: 22 days  Attending Provider: Heriebrto Rosa MD   Primary Care Physician: Philippe Mhor MD  Principal Problem:Acute respiratory failure with hypoxia    Subjective:     HPI: Mr. Crocker is a 43 yo WM with T1DM, Hashimoto thyroiditis, h/o OHTx 11/2014, and CKD stage 4 who was admitted on 3/11/18 for persistent diarrhea. He reported a 3 week history of diarrhea up to 15x/day. Associated symptoms including URI symptoms, coughing fits, and coughing syncope. Prograf level was 14.3. His post-heart transplant course has been complicated by [facility] 4/2015, CMV, post-transplant restrictive cardiomyopathy, recurrent pleural effusions/ascites requiring monthly thoracenteses/paracenteses, VATS 1/11/18 with Pleur-X catheter (now removed), and CKD stage 4 with baseline sCr 2.6-3.0. Baseline -120s and baseline BP 90s-110s/60-70s. Upon admission he was started on IVF and diarrhea workup was initiated. On 3/12 he was noted to have decreased UOP despite fluids; NS was increased to 100cc/hr. He was scheduled for a colonoscopy on 3/13 however procedure was cancelled due to hypoxia and fever. He was transferred to the ICU for closer monitoring. He continued to have oliguria overnight. Bladder scan revealed 200cc of urine but patient refused osullivan catheter placement. Wife reports that patient always has a hard time urinating again after osullivan catheters are placed/removed so he refuses them. He remained hypoxic despite FiO2 70% and ABG revealed combined respiratory and metabolic acidosis with pH 7.17. He was started on BiPAP as well as a bicarb infusion at 50cc/hr. ABG with mild improvement. AM labs revealed K 6.2 and he was shifted and given kayexalate.Trialysis catheter was placed this morning and he subsequently developed hypotension and was started on levophed. He  was intubated later this morning. Nephrology consulted for CACHORRO. All history obtained by primary team and chart review as patient was intubated/sedated on exam. Consent for dialysis obtained by patient's wife and placed in chart. Of note, both of patient's parents were on dialysis.     Interval History:  SLED restarted early yesterday for metabolic acidosis. Beta-hydroxybutyrate elevated; possible etiology. No events overnight. Surgery to be consulted for PEG/TDC placement. Received SLED overnight with UF 200cc/hr. Net +300cc yesterday. CVP 10 this morning. Minimal hourly intake.     Review of patient's allergies indicates:   Allergen Reactions    No known drug allergies      Current Facility-Administered Medications   Medication Frequency    0.9%  NaCl infusion (for blood administration) Q24H PRN    alprazolam ODT dissolvable tablet 0.5 mg TID PRN    chlorhexidine 0.12 % solution 15 mL BID    dextrose 50% injection 12.5 g PRN    dextrose 50% injection 25 g PRN    famotidine (PF) injection 20 mg Daily    fentaNYL 25 mcg/hr 1 patch Q72H    fentaNYL injection 25 mcg Q2H PRN    glucagon (human recombinant) injection 1 mg PRN    glucose chewable tablet 16 g PRN    glucose chewable tablet 24 g PRN    heparin (porcine) injection 5,000 Units Q12H    hydrocortisone sodium succinate injection 25 mg BID    insulin aspart U-100 pen 0-4 Units PRN    insulin regular (Humulin R) 100 Units in sodium chloride 0.9% 100 mL infusion Continuous    levothyroxine injection 88 mcg Daily    ondansetron disintegrating tablet 8 mg Q6H PRN    povidone-iodine 10 % ointment PRN    sodium chloride 0.9% flush 3 mL Q8H    [START ON 4/4/2018] tacrolimus capsule 0.5 mg Daily    tacrolimus capsule 1 mg Daily       Objective:     Vital Signs (Most Recent):  Temp: 98.1 °F (36.7 °C) (04/03/18 1100)  Pulse: 109 (04/03/18 1257)  Resp: (!) 22 (04/03/18 1257)  BP: (!) 88/57 (04/03/18 1200)  SpO2: 100 % (04/03/18 1257)  O2 Device (Oxygen  Therapy): ventilator (04/03/18 1257) Vital Signs (24h Range):  Temp:  [97.5 °F (36.4 °C)-98.1 °F (36.7 °C)] 98.1 °F (36.7 °C)  Pulse:  [104-122] 109  Resp:  [0-29] 22  SpO2:  [97 %-100 %] 100 %  BP: (82-95)/(51-59) 88/57  Arterial Line BP: ()/(39-63) 82/40     Weight: 62.2 kg (137 lb 2 oz) (04/03/18 0600)  Body mass index is 21.48 kg/m².  Body surface area is 1.71 meters squared.    I/O last 3 completed shifts:  In: 6118.9 [I.V.:5818.9; IV Piggyback:300]  Out: 6979 [Other:6979]    Physical Exam   Constitutional: He appears well-developed and well-nourished.   HENT:   Head: Normocephalic and atraumatic.   Eyes: Conjunctivae and EOM are normal.   Cardiovascular: Normal rate and regular rhythm.    Pulmonary/Chest: He has no wheezes. He has no rales.   Abdominal: Soft. He exhibits no distension.   Musculoskeletal: He exhibits no edema or deformity.   Skin: Skin is warm and dry. He is not diaphoretic.       Significant Labs:  ABGs:   Recent Labs  Lab 04/03/18  0333   PH 7.415   PCO2 30.1*   HCO3 19.3*   POCSATURATED 99   BE -5     CBC:   Recent Labs  Lab 04/03/18  0859   WBC 3.27*   RBC 2.30*   HGB 7.2*   HCT 22.2*   *   MCV 97   MCH 31.3*   MCHC 32.4     CMP:   Recent Labs  Lab 04/03/18  0324   *  111*   CALCIUM 7.9*  7.9*   ALBUMIN 2.4*  2.4*   PROT 5.5*     139   K 4.3  4.3   CO2 22*  22*     107   BUN 4*  4*   CREATININE 0.4*  0.4*   ALKPHOS 166*   ALT 14   AST 23   BILITOT 1.2*     All labs within the past 24 hours have been reviewed.     Significant Imaging:  Labs: Reviewed    Assessment/Plan:     Acute renal failure superimposed on stage 4 chronic kidney disease    - baseline sCr 2.6-3.0 consistent with CKD stage IV  - anuric CACHORRO; likely ischemic ATN from prolonged pre-renal state (diarrhea) in setting of prograf renal vasoconstriction; cannot r/o septic ATN or Prograf toxicity  - SLED started 3/14  - remains anuric. Continues to need daily metabolic clearance with RRT  -  received ~18 hours SLED yesterday for metabolic acidosis. Acidemia resolved. Net +300cc yesterday.   - agree with surgery consult for tunneled HD catheter  - will hold today. Will plan for nocturnal SLED tonight with 3K/35HCO3. Hopefully if electrolytes look good tomorrow, can hold RRT tomorrow and transition to QOD schedule.             Amairani Alegria, PGY-5  Nephrology Fellow  [facility]  Pager: [phone]

## 2018-04-03 NOTE — PROGRESS NOTES
[facility]  Pulmonology  Progress Note    Patient Name: Lv Crocker  MRN: [id number]  Admission Date: 3/11/2018  Hospital Length of Stay: 22 days  Code Status: Full Code  Attending Provider: Heriberto Rosa MD  Primary Care Provider: Philippe Mohr MD   Principal Problem: Acute respiratory failure with hypoxia    Subjective:     Interval History:     41 y/o Male with h/o Heart transplant, restrictive cardiomyopathy who initially presented to hospital for diarrhea. Over prolonged hospital stay, he developed respiratory failure likely secondary to RSV infection, failure to wean from MV lead to tracheostomy. His hospital course has also been complicated by renal failure now requiring SLED.     Improved today and more interactive.  No complaints.  He is able to tolerate Spontaneous breathing with pressure support this afternoon.  He is scheduled for a PEG tomorrow.      Objective:     Vital Signs (Most Recent):  Temp: 98.1 °F (36.7 °C) (04/03/18 1100)  Pulse: (!) 114 (04/03/18 1533)  Resp: 20 (04/03/18 1533)  BP: 108/66 (04/03/18 1533)  SpO2: 100 % (04/03/18 1533) Vital Signs (24h Range):  Temp:  [97.7 °F (36.5 °C)-98.1 °F (36.7 °C)] 98.1 °F (36.7 °C)  Pulse:  [104-122] 114  Resp:  [0-29] 20  SpO2:  [97 %-100 %] 100 %  BP: ()/(51-66) 108/66  Arterial Line BP: ()/(39-63) 82/40     Weight: 62.2 kg (137 lb 2 oz)  Body mass index is 21.48 kg/m².      Intake/Output Summary (Last 24 hours) at 04/03/18 1653  Last data filed at 04/03/18 1400   Gross per 24 hour   Intake          3586.62 ml   Output             3065 ml   Net           521.62 ml       Physical Exam       Gen: weak, fatigued appearing patient. On ventilator. Less responsive   HEENT: trach in place. Right IJ trialysis cath in place.   Chest: bilateral breath sounds noted.    Heart: Regular tachycardia.    Abdomen: soft, non tender, no masses. No g/r/r  Extremities: pitting edema. No cyanosis. No deformities.        Vents:  Vent Mode: A/C (04/03/18 1533)  Ventilator Initiated: Yes (03/14/18 0932)  Set Rate: 22 bmp (04/03/18 1533)  Vt Set: 420 mL (04/03/18 1533)  Pressure Support: 5 cmH20 (04/02/18 1039)  PEEP/CPAP: 5 cmH20 (04/03/18 1533)  Oxygen Concentration (%): 25 (04/03/18 1533)  Peak Airway Pressure: 31 cmH2O (04/03/18 1533)  Plateau Pressure: 25 cmH20 (04/03/18 0319)  Total Ve: 9.51 mL (04/03/18 1533)  F/VT Ratio<105 (RSBI): (!) 46.51 (04/03/18 1533)    Lines/Drains/Airways     Central Venous Catheter Line                 Trialysis (Dialysis) Catheter 03/23/18 1433 right internal jugular 11 days          Airway                 Surgical Airway 03/28/18 1625 Shiley 6 days          Arterial Line                 Arterial Line 03/14/18 1600 Right Femoral 20 days          Peripheral Intravenous Line                 Peripheral IV - Single Lumen 03/20/18 1640 Right Forearm 14 days                Significant Labs:    CBC/Anemia Profile:    Recent Labs  Lab 04/02/18  1037 04/02/18  2043 04/03/18  0859   WBC 5.02 2.98* 3.27*   HGB 7.9* 7.4* 7.2*   HCT 25.7* 22.3* 22.2*    122* 126*   * 96 97   RDW 16.9* 16.7* 17.2*        Chemistries:    Recent Labs  Lab 04/02/18  0314  04/02/18  2207 04/03/18  0324 04/03/18  1428     137  < > 138 139  139 138   K 4.9  4.9  < > 4.5 4.3  4.3 4.6     105  < > 106 107  107 106   CO2 21*  21*  < > 21* 22*  22* 16*   BUN 5*  5*  < > 4* 4*  4* 11   CREATININE 0.7  0.7  < > 0.5 0.4*  0.4* 0.9   CALCIUM 8.3*  8.3*  < > 8.1* 7.9*  7.9* 8.1*   ALBUMIN 2.6*  2.6*  < > 2.4* 2.4*  2.4* 2.4*   PROT 6.3  --   --  5.5*  --    BILITOT 1.1*  --   --  1.2*  --    ALKPHOS 189*  --   --  166*  --    ALT 15  --   --  14  --    AST 24  --   --  23  --    MG 1.8  < > 1.7 1.4* 2.3   PHOS 1.9*  < > 1.0* 1.7* 3.3   < > = values in this interval not displayed.    All pertinent labs within the past 24 hours have been reviewed.    Significant Imaging:  I have reviewed and  interpreted all pertinent imaging results/findings within the past 24 hours.    Assessment/Plan:     * Acute respiratory failure with hypoxia    PT/OT is vital and he needs to get out of bed to help improve his chance of freeing from the vent.    Trach collar during the day and can be on then vent at night.   Transition back to trach collar  Starting tomorrow, and allow for that during the day and then place back on a rate and support at night.            Acute renal failure superimposed on stage 4 chronic kidney disease    NAGMA, and endo is following will likely need more SLED for metabolic acidosis and his poor ability to compensate.          Debility    Needs to start PT and OT, and work towards be out of bed and in a chair if possible        Anxiety    Recommend that he be treated with less sedating medications.  Seems to be doing better with a fentanyl patch, and would again avoid more sedating medications.            Slowly improving, would work to have on trach collar while awake and support at night.  Aggressive PT and OT.    Will follow from a distance please call back with any question.  Thank you      Simone Benitez MD  Pulmonology  [facility]

## 2018-04-03 NOTE — ASSESSMENT & PLAN NOTE
PT/OT is vital and he needs to get out of bed to help improve his chance of freeing from the vent.    Trach collar during the day and can be on then vent at night.   Transition back to trach collar  Starting tomorrow, and allow for that during the day and then place back on a rate and support at night.

## 2018-04-03 NOTE — PLAN OF CARE
Problem: Patient Care Overview  Goal: Plan of Care Review  Outcome: Ongoing (interventions implemented as appropriate)  Mr. Crocker had a relatively good shift today. Hypotensive this am but improved by afternoon. Patient reports anxiety/nervous about procedure tomorrow: PEG and permacath placement. See vital signs and assessments in flowsheets. See below for updates on today's progress.     Pulmonary: Remains on vent, SIMV this afternoon, tolerating okay. Slightly anxious, given xanax at 5pm      Cardiovascular: ST on tele. Hypotensive this am after fentanyl IVP, improved this afternoon. Art line d/c'd.     Neurological: AAOx4     Gastrointestinal: BM x5 today, soft, brown. Bowel sounds audible.      Genitourinary: Anuric, CRRT to be restarted tonight.     Endocrine: Insulin gtt at 0.2u/hr. Sliding scale to cover PRN.    Integumentary/Other: Skin intact. Full ChG bath given with facial shave and shampoo cap. Barrier cream applied to sacrum. Foot drop boot on left foot. Necrotic right toes, order received today for betadine BID.     Infusions: insulin gtt     Patient progressing towards goals as tolerated, plan of care communicated and reviewed with Lv Crocker and family. All concerns addressed. Will continue to monitor.

## 2018-04-03 NOTE — ASSESSMENT & PLAN NOTE
BG goal 140-1180    rewrite transition at 0.3 units/hr. POC q4 Low dose correction  Discussed with nurse, pt is T1DM, do not suspend insulin >1 hr    Elevated BHOB 4.2 noted with NAGMA. Differential starvation ketosis, as DKA less likely in setting of normal AG, normal Glc, and continuous insulin gtt infusion.  If feedings (TPN or TF) to be delayed, recommend starting dextrose gtt which will allow for uptitration in insulin gtt.     Discharge Recommendations:  TBD.

## 2018-04-03 NOTE — HPI
Lv Crocker is a 42 year old male with PMH of OHTX in November 2014, Hashimoto's thyroiditis, T1DM, CKD and history of recurrent acsites and pleural effusions now admitted for respiratory failure from severe RSV. He underwent a right VATS drainage of effusion and Pleurx placement on 1/11/18 by Dr. James. Pleurx was removed in clinic on 2/7/18 due to minimal output and potential clogging. Recently admitted on 3/13/18 after he was found to be febrile and hypoxic in endoscopy lab.  Intubated on 3/14/18 for hypoxemic and hypercapnic resp failure. Chest CT at that time showed small right pleural effusion and bilateral subsegmental multifocal consolidation with air bronchograms more extensive in left lung. Since then he has had a complicated hospital stay for presumed severe RSV including acute kidney failure on CRRT, intermittent pressor requirement, DIC and several failed SBTs.    PSH significant for OHTx in 2014 and cholecystectomy. Prior to right pleurx placement, he was getting monthly paracenteses and thoracentesis.

## 2018-04-03 NOTE — PROGRESS NOTES
"[facility]  Endocrinology  Progress Note    Admit Date: 3/11/2018     Reason for Consult: abnormal TFTs    Surgical Procedure and Date: s/p heart transplant 2014     Diabetes diagnosis year: 7yo (T1DM)     Home Diabetes Medications:    Levemir 15u qHS  Novolog 4u AC     How often checking glucose at home? 4 times daily   BG readings on regimen: 150-200s  Hypoglycemia on the regimen?  Yes, rarely - treats appropriately (light snack, glucose tab)  Missed doses on regimen?  No        Diabetes Complications include:     Hyperglycemia, Hypoglycemia  and Diabetic chronic kidney disease          Complicating diabetes co morbidities:   N/A     HPI:   Patient is a 42 y.o. male with a diagnosis of T1DM, HTN, hypothyroidism, s/p heart transplant.  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 He required serial paracentesis and thoracentesis until he underwent a VATS with pleurX catheter placement on 1/11/2018 and removed 2/7/18.       Presented to hospital secondary to diarrhea and cough.  Upon initial labs, TSH was decreased (0.101) and free T4 1.33.  Endocrinology consulted to assist in management of these labs.  He was last seen in clinic by Kamala Vázquez NP 11/2017.   Previous hospitalization, endocrine consulted for same problem.  During that visit, recommended decreasing LT4 to 125mcg daily. Unfortunately, patient was discharged on previous dose of 150mcg daily.     Interval HPI:   Overnight events:  Remains trach. NPO.   Possible PEG in near future   BG at goal no hypoglycemia gtt stepped down from 0.5 to 0.3u/hr  Elevated BHOB 4 noted    BP (!) 93/51   Pulse (!) 117   Temp 97.8 °F (36.6 °C) (Oral)   Resp (!) 22   Ht 5' 7" (1.702 m)   Wt 62.2 kg (137 lb 2 oz)   SpO2 100%   BMI 21.48 kg/m²       Labs Reviewed and Include      Recent Labs  Lab 04/03/18  0324   *  111*   CALCIUM 7.9* "  7.9*   ALBUMIN 2.4*  2.4*   PROT 5.5*     139   K 4.3  4.3   CO2 22*  22*     107   BUN 4*  4*   CREATININE 0.4*  0.4*   ALKPHOS 166*   ALT 14   AST 23   BILITOT 1.2*     Lab Results   Component Value Date    WBC 3.27 (L) 04/03/2018    HGB 7.2 (L) 04/03/2018    HCT 22.2 (L) 04/03/2018    MCV 97 04/03/2018     (L) 04/03/2018       Recent Labs  Lab 03/30/18  0217   TSH 9.412*   FREET4 0.81     Lab Results   Component Value Date    HGBA1C 6.9 (H) 02/11/2018       Nutritional status:   Body mass index is 21.48 kg/m².  Lab Results   Component Value Date    ALBUMIN 2.4 (L) 04/03/2018    ALBUMIN 2.4 (L) 04/03/2018    ALBUMIN 2.4 (L) 04/02/2018     Lab Results   Component Value Date    PREALBUMIN 5 (L) 03/15/2018    PREALBUMIN 18 (L) 03/24/2015    PREALBUMIN 19 (L) 12/17/2014       Estimated Creatinine Clearance: 207.3 mL/min (A) (based on SCr of 0.4 mg/dL (L)).    Accu-Checks  Recent Labs      04/01/18   1958  04/02/18   0028  04/02/18   0318  04/02/18   0854  04/02/18   1148  04/02/18   1510  04/02/18   2051  04/03/18   0059  04/03/18   0328  04/03/18   0910   POCTGLUCOSE  162*  130*  127*  228*  278*  164*  117*  118*  112*  115*       Current Medications and/or Treatments Impacting Glycemic Control  Immunotherapy:  Immunosuppressants         Stop Route Frequency     tacrolimus capsule 0.5 mg      -- SL 2 times daily        Steroids:   Hormones     Start     Stop Route Frequency Ordered    03/31/18 0900  hydrocortisone sodium succinate injection 25 mg      -- IV 2 times daily 03/30/18 1711        Pressors:    Autonomic Drugs     None        Hyperglycemia/Diabetes Medications: Antihyperglycemics     Start     Stop Route Frequency Ordered    04/02/18 1019  insulin aspart U-100 pen 0-4 Units      -- SubQ As needed (PRN) 04/02/18 0919 04/02/18 0916  insulin regular (Humulin R) 100 Units in sodium chloride 0.9% 100 mL infusion      -- IV Continuous 04/02/18 0919          ASSESSMENT and PLAN    *  Acute respiratory failure with hypoxia    Per primary  S/p trach        Type 1 diabetes mellitus with stage 3 chronic kidney disease    BG goal 140-1180    rewrite transition at 0.3 units/hr. POC q4 Low dose correction  Discussed with nurse, pt is T1DM, do not suspend insulin >1 hr    Elevated BHOB 4.2 noted with NAGMA. Differential starvation ketosis, as DKA less likely in setting of normal AG, normal Glc, and continuous insulin gtt infusion.  If feedings (TPN or TF) to be delayed, recommend starting dextrose gtt which will allow for uptitration in insulin gtt.     Discharge Recommendations:  TBD.          Current chronic use of systemic steroids     Agree with HC              Hypothyroidism due to Hashimoto's thyroiditis     Abnormal TFTs upon admit.  Unclear if secondary to illness, but with evidence of iatrogenic hyperthyroidism in past while on above weight based dose.      TSH 9 Possibly from missed doses from lack of PO access vs. Bowel edema with malabsorption, vs NTIS.  Reassuring that FT4 normal.      Continue IV LT4  88 mcg daily (which is 70% PO dose 125mcg) and would follow clinical s/s rather than TFTs solely in setting of contributing NTIS.     Recheck TFTs in 4 weeks.             Acute renal failure superimposed on stage 4 chronic kidney disease     Avoid insulin stacking             Heart transplanted     Per transplant  Avoid hypoglycemia            Palmira Dorsey MD  Endocrinology  [facility]

## 2018-04-03 NOTE — PT/OT/SLP PROGRESS
Physical Therapy Treatment    Patient Name:  Lv Crocker   MRN:  [id number]  RN present   Recommendations:     Discharge Recommendations:  LTACH (long term acute care hospital)   Discharge Equipment Recommendations:  (TBD)   Barriers to discharge: Decreased caregiver support at current functional level     Assessment:     Lv Crocker is a 42 y.o. male admitted with a medical diagnosis of Acute respiratory failure with hypoxia.  He presents with the following impairments/functional limitations:  weakness, impaired functional mobilty, gait instability, impaired self care skills, impaired endurance, impaired balance, decreased upper extremity function, decreased lower extremity function, pain, impaired cardiopulmonary response to activity. Pt required increased encouragement to participate in therapy today. Pt required total A for all functional mobility. Pt with max decrease of AROM in B UE and B LE.     Rehab Prognosis:  good; patient would benefit from acute skilled PT services to address these deficits and reach maximum level of function.      Recent Surgery: Procedure(s) (LRB):  TRACHEOSTOMY (N/A)  BRONCHOSCOPY-OPERATIVE,FLEXIBLE (N/A)  LAVAGE-ALVEOLAR (N/A) 6 Days Post-Op    Plan:     During this hospitalization, patient to be seen 5 x/week to address the above listed problems via gait training, therapeutic exercises, neuromuscular re-education, therapeutic activities  · Plan of Care Expires:  04/23/18   Plan of Care Reviewed with: patient, spouse    Subjective     Communicated with RN prior to session and wife present in room.  Patient found in bed upon PT entry to room, agreeable to treatment.      Chief Complaint: pain and not wanting to participate in therapy   Patient comments/goals: to get better   Pain/Comfort:  · Pain Rating 1:  (B toes and back; unrated; worse with movement )  · Pain Addressed 1: Distraction, Reposition, Cessation of Activity    Patients cultural, spiritual,  Oriental orthodox conflicts given the current situation: none reported     Objective:     Patient found with: tracheostomy, ventilator, telemetry, pulse ox (continuous), blood pressure cuff, central line, peripheral IV, arterial line     General Precautions: Standard, fall   Orthopedic Precautions:N/A   Braces: N/A     Functional Mobility:  · Bed Mobility:     · Rolling Right: total assistance  · Scooting: total assistance to EOB  · Supine to Sit: total assistance and of 2 persons  · Sit to Supine: total assistance and of 2 persons  · Transfers: not performed   · Gait: not performed   · Balance:   · Static sit: total A  · Verbal and tactile cues to maintain midline  · Pt with increased neck flexion; requiring assist to hold up in neutral; pt unable to maintain neutral positioning       AM-PAC 6 CLICK MOBILITY  Turning over in bed (including adjusting bedclothes, sheets and blankets)?: 2  Sitting down on and standing up from a chair with arms (e.g., wheelchair, bedside commode, etc.): 1  Moving from lying on back to sitting on the side of the bed?: 2  Moving to and from a bed to a chair (including a wheelchair)?: 1  Need to walk in hospital room?: 1  Climbing 3-5 steps with a railing?: 1  Total Score: 8       Therapeutic Activities and Exercises:  Educated pt on PT POC  Educated pt on importance of OOB activity  Pt verbalized understanding    B LE PROM  LAQ's x 10 reps    B UE   Hand squeezes x 10 reps (decreased strength; AROM)  Elbow flexion/extension x 10 reps PROM     Sitting EOB x 12 minutes to increase tolerance to OOB activity, work on postural control, and to create optimal positioning for lung expansion    Patient left HOB elevated with all lines intact, call button in reach, RN  notified and wife present..    GOALS:    Physical Therapy Goals        Problem: Physical Therapy Goal    Goal Priority Disciplines Outcome Goal Variances Interventions   Physical Therapy Goal     PT/OT, PT Ongoing (interventions implemented  as appropriate)     Description:  Goals to be met by: 18     Patient will increase functional independence with mobility by performin. Supine to sit with Moderate Assistance - not met  2. Sit to supine with Moderate Assistance - not met  3. Rolling to Left and Right with Minimal Assistance. - not met  4. Sit to stand transfer with Moderate Assistance - not met  5. Bed to chair transfer with Maximum Assistance - not met  6. Gait  x 20 feet with Minimal Assistance. - not met                       Time Tracking:     PT Received On: 18  PT Start Time: 1450     PT Stop Time: 1520  PT Total Time (min): 30 min     Billable Minutes: Therapeutic Activity 15 and Therapeutic Exercise 15    Treatment Type: Treatment  PT/PTA: PT     PTA Visit Number: 0       Teresa Dudley PT, DPT  4/3/2018  [phone]

## 2018-04-03 NOTE — PLAN OF CARE
Problem: Spiritual Distress, Risk/Actual (Adult,Obstetrics,Pediatric)  Intervention: Facilitate Personal Exploration/Expression of Spirituality  Provided f/u visit. Pt and pt's wife bedside. Prayer was requested, particularly regarding anxiety around financial provision. Family aware of 's presence as needed. Will continue to follow.

## 2018-04-03 NOTE — SUBJECTIVE & OBJECTIVE
Interval History: Feeling a bit better this am. Less anxious/nauseous.     Continuous Infusions:   sodium chloride 0.9% 200 mL/hr at 04/03/18 0700    insulin (HUMAN R) infusion (adults) 0.3 Units/hr (04/03/18 0600)     Scheduled Meds:   chlorhexidine  15 mL Mouth/Throat BID    famotidine (PF)  20 mg Intravenous Daily    heparin (porcine)  5,000 Units Subcutaneous Q12H    hydrocortisone sodium succinate  25 mg Intravenous BID    levothyroxine  88 mcg Intravenous Daily    sodium chloride 0.9%  3 mL Intravenous Q8H    tacrolimus  0.5 mg Sublingual BID     PRN Meds:sodium chloride, alprazolam ODT, dextrose 50%, dextrose 50%, fentaNYL, glucagon (human recombinant), glucose, glucose, insulin aspart U-100, ondansetron    Review of patient's allergies indicates:   Allergen Reactions    No known drug allergies      Objective:     Vital Signs (Most Recent):  Temp: 97.8 °F (36.6 °C) (04/03/18 0700)  Pulse: (!) 115 (04/03/18 0743)  Resp: 14 (04/03/18 0743)  BP: (!) 103/59 (04/02/18 1300)  SpO2: 100 % (04/03/18 0743) Vital Signs (24h Range):  Temp:  [97.5 °F (36.4 °C)-97.9 °F (36.6 °C)] 97.8 °F (36.6 °C)  Pulse:  [104-126] 115  Resp:  [0-43] 14  SpO2:  [97 %-100 %] 100 %  BP: (103)/(59) 103/59  Arterial Line BP: ()/(41-63) 105/56     Patient Vitals for the past 72 hrs (Last 3 readings):   Weight   04/03/18 0600 62.2 kg (137 lb 2 oz)   04/02/18 1100 65.9 kg (145 lb 4.5 oz)   04/02/18 0300 65.9 kg (145 lb 4.5 oz)     Body mass index is 21.48 kg/m².      Intake/Output Summary (Last 24 hours) at 04/03/18 0759  Last data filed at 04/03/18 0700   Gross per 24 hour   Intake          3942.56 ml   Output             3616 ml   Net           326.56 ml     Telemetry: ST    Physical Exam   Constitutional:   Chronically ill    HENT:   Head: Normocephalic and atraumatic.   Eyes: Conjunctivae are normal. Pupils are equal, round, and reactive to light.   Neck: No JVD present. No thyromegaly present.   RIJ trialysis   Tracheostomy  (C/D/I)   Cardiovascular: Regular rhythm.    Tachycardic   Pulmonary/Chest:   Coarse breath sounds throughout   Mostly clear    Abdominal: Soft. Bowel sounds are normal.   Musculoskeletal:   Trace - 1+ gen edema   Neurological:   AAO x 3.   Skin: Skin is warm and dry. Capillary refill takes less than 2 seconds. There is pallor.     Significant Labs:  CBC:    Recent Labs  Lab 04/01/18  1954 04/02/18  1037 04/02/18 2043   WBC 2.93* 5.02 2.98*   RBC 2.62* 2.56* 2.32*   HGB 8.0* 7.9* 7.4*   HCT 25.2* 25.7* 22.3*   * 150 122*   MCV 96 100* 96   MCH 30.5 30.9 31.9*   MCHC 31.7* 30.7* 33.2     CMP:    Recent Labs  Lab 04/01/18  0313  04/02/18  0314 04/02/18  1407 04/02/18  2207 04/03/18  0324   *  124*  < > 138*  138* 176* 108 111*  111*   CALCIUM 8.0*  8.0*  < > 8.3*  8.3* 8.2* 8.1* 7.9*  7.9*   ALBUMIN 2.5*  2.5*  < > 2.6*  2.6* 2.5* 2.4* 2.4*  2.4*   PROT 6.0  --  6.3  --   --  5.5*     139  < > 137  137 137 138 139  139   K 4.8  4.8  < > 4.9  4.9 5.0 4.5 4.3  4.3   CO2 22*  22*  < > 21*  21* 17* 21* 22*  22*     107  < > 105  105 106 106 107  107   BUN 5*  5*  < > 5*  5* 11 4* 4*  4*   CREATININE 0.6  0.6  < > 0.7  0.7 0.8 0.5 0.4*  0.4*   ALKPHOS 185*  --  189*  --   --  166*   ALT 15  --  15  --   --  14   AST 25  --  24  --   --  23   BILITOT 1.3*  --  1.1*  --   --  1.2*   < > = values in this interval not displayed.     Microbiology:  Microbiology Results (last 7 days)     Procedure Component Value Units Date/Time    Culture, Anaerobe [[id number]] Collected:  03/28/18 1607    Order Status:  Completed Specimen:  Body Fluid from Lung, RLL Updated:  04/02/18 1251     Anaerobic Culture No anaerobes isolated    Aerobic culture [[id number]] Collected:  03/28/18 1607    Order Status:  Completed Specimen:  Body Fluid from Lung, RLL Updated:  03/31/18 1209     Aerobic Bacterial Culture No growth    Blood culture [[id number]] Collected:  03/25/18 1554    Order Status:   Completed Specimen:  Blood from Peripheral, Upper Arm, Right Updated:  03/30/18 1812     Blood Culture, Routine No growth after 5 days.    Blood culture [[id number]] Collected:  03/25/18 1606    Order Status:  Completed Specimen:  Blood from Peripheral, Forearm, Left Updated:  03/30/18 1812     Blood Culture, Routine No growth after 5 days.    AFB Culture & Smear [[id number]] Collected:  03/28/18 1607    Order Status:  Completed Specimen:  Body Fluid from Lung, RLL Updated:  03/29/18 2127     AFB Culture & Smear Culture in progress     AFB CULTURE STAIN No acid fast bacilli seen.    Gram stain [[id number]] Collected:  03/28/18 1607    Order Status:  Completed Specimen:  Body Fluid from Lung, RLL Updated:  03/28/18 1654     Gram Stain Result Rare WBC's      No organisms seen    Fungus culture [[id number]] Collected:  03/28/18 1607    Order Status:  Sent Specimen:  Body Fluid from Lung, RLL Updated:  03/28/18 1617    Fungus culture [[id number]] Collected:  03/14/18 1137    Order Status:  Completed Specimen:  Bronchial Wash from Amniotic Fluid Updated:  03/28/18 0933     Fungus (Mycology) Culture Culture in progress     Fungus (Mycology) Culture No fungus isolated after 2 weeks        I have reviewed all pertinent labs within the past 24 hours.    Estimated Creatinine Clearance: 207.3 mL/min (A) (based on SCr of 0.4 mg/dL (L)).    Diagnostic Results:  I have reviewed all pertinent imaging results/findings within the past 24 hours.

## 2018-04-03 NOTE — ASSESSMENT & PLAN NOTE
41 yo male w extensive past medical history, including dysphagia and acute renal failure    -plan for perm-a-cath, possible open g vs PEG in OR tomorrow 4/4/18  -NPO/hold tube feeds p MN 4/4/18  -consents to be obtained

## 2018-04-03 NOTE — PHYSICIAN QUERY
PT Name: Lv Crocker  MR #: [id number]    Physician Query Form - Respiratory Condition Clarification      CDS/: Emilie Hussein RN, CCDS              Contact information: [email or web address]    This form is a permanent document in the medical record.    Query Date: April 3, 2018    By submitting this query, we are merely seeking further clarification of documentation. Please utilize your independent clinical judgment when addressing the question(s) below.    The Medical record contains the following:   Indicators   Supporting Clinical Findings Location in Medical Record   X Pulmonary Edema documented 3/14. CXR and CT chest from 3/14 show significant pulmonary edema     Failed SBT on 3/24; UF increased to help remove pulmonary edema. 3/18 pulm note        3/26 nephro note    Wheezing, Productive cough, SOB, AGUILAR, Use of accessory muscles documented     X Radiology Findings There is diffuse alveolar filling left greater than right.  Probable increase in the volume of right pleural fluid.  Findings most consistent with worsening of congestive failure. 3/14 cxr   X Hypoxia , Hypoxemia Hypoxic documented Acute respiratory failure with hypoxia 3/13- 4/3 prog notes    Respiratory Distress documented      RR                  PaO2                  PaCO2                     O2 sat      Treatment:     X Supplemental O2: Called to room for emergent intubation.  Tracheostomy 3/28 3/14 RN note    3/28 op note    Oxygen dependence      Other:         Provider, please specify diagnosis or diagnoses associated with above clinical findings.      Pulmonary Edema (please specify acuity and type)   Acuity    Type   [ ] Acute  [X ] Cardiac (Specify cause) _________diastolic heart failure_____________   [ ] Chronic   [ ] Non Cardiac (Specify cause) ________________________   [X ] Acute on Chronic [ ] Unspecified    [ ] Other Respiratory Diagnosis (please specify): _________________________________  [ ]  Clinically Undetermined    Please document in your progress notes daily for the duration of treatment, until resolved, and include in your discharge summary.

## 2018-04-03 NOTE — SUBJECTIVE & OBJECTIVE
Interval History:     41 y/o Male with h/o Heart transplant, restrictive cardiomyopathy who initially presented to hospital for diarrhea. Over prolonged hospital stay, he developed respiratory failure likely secondary to RSV infection, failure to wean from MV lead to tracheostomy. His hospital course has also been complicated by renal failure now requiring SLED.     Improved today and more interactive.  No complaints.  He is able to tolerate Spontaneous breathing with pressure support this afternoon.  He is scheduled for a PEG tomorrow.      Objective:     Vital Signs (Most Recent):  Temp: 98.1 °F (36.7 °C) (04/03/18 1100)  Pulse: (!) 114 (04/03/18 1533)  Resp: 20 (04/03/18 1533)  BP: 108/66 (04/03/18 1533)  SpO2: 100 % (04/03/18 1533) Vital Signs (24h Range):  Temp:  [97.7 °F (36.5 °C)-98.1 °F (36.7 °C)] 98.1 °F (36.7 °C)  Pulse:  [104-122] 114  Resp:  [0-29] 20  SpO2:  [97 %-100 %] 100 %  BP: ()/(51-66) 108/66  Arterial Line BP: ()/(39-63) 82/40     Weight: 62.2 kg (137 lb 2 oz)  Body mass index is 21.48 kg/m².      Intake/Output Summary (Last 24 hours) at 04/03/18 1653  Last data filed at 04/03/18 1400   Gross per 24 hour   Intake          3586.62 ml   Output             3065 ml   Net           521.62 ml       Physical Exam       Gen: weak, fatigued appearing patient. On ventilator. Less responsive   HEENT: trach in place. Right IJ trialysis cath in place.   Chest: bilateral breath sounds noted.    Heart: Regular tachycardia.    Abdomen: soft, non tender, no masses. No g/r/r  Extremities: pitting edema. No cyanosis. No deformities.       Vents:  Vent Mode: A/C (04/03/18 1533)  Ventilator Initiated: Yes (03/14/18 0932)  Set Rate: 22 bmp (04/03/18 1533)  Vt Set: 420 mL (04/03/18 1533)  Pressure Support: 5 cmH20 (04/02/18 1039)  PEEP/CPAP: 5 cmH20 (04/03/18 1533)  Oxygen Concentration (%): 25 (04/03/18 1533)  Peak Airway Pressure: 31 cmH2O (04/03/18 1533)  Plateau Pressure: 25 cmH20 (04/03/18  0319)  Total Ve: 9.51 mL (04/03/18 1533)  F/VT Ratio<105 (RSBI): (!) 46.51 (04/03/18 1533)    Lines/Drains/Airways     Central Venous Catheter Line                 Trialysis (Dialysis) Catheter 03/23/18 1433 right internal jugular 11 days          Airway                 Surgical Airway 03/28/18 1625 Shiley 6 days          Arterial Line                 Arterial Line 03/14/18 1600 Right Femoral 20 days          Peripheral Intravenous Line                 Peripheral IV - Single Lumen 03/20/18 1640 Right Forearm 14 days                Significant Labs:    CBC/Anemia Profile:    Recent Labs  Lab 04/02/18  1037 04/02/18  2043 04/03/18  0859   WBC 5.02 2.98* 3.27*   HGB 7.9* 7.4* 7.2*   HCT 25.7* 22.3* 22.2*    122* 126*   * 96 97   RDW 16.9* 16.7* 17.2*        Chemistries:    Recent Labs  Lab 04/02/18  0314  04/02/18  2207 04/03/18  0324 04/03/18  1428     137  < > 138 139  139 138   K 4.9  4.9  < > 4.5 4.3  4.3 4.6     105  < > 106 107  107 106   CO2 21*  21*  < > 21* 22*  22* 16*   BUN 5*  5*  < > 4* 4*  4* 11   CREATININE 0.7  0.7  < > 0.5 0.4*  0.4* 0.9   CALCIUM 8.3*  8.3*  < > 8.1* 7.9*  7.9* 8.1*   ALBUMIN 2.6*  2.6*  < > 2.4* 2.4*  2.4* 2.4*   PROT 6.3  --   --  5.5*  --    BILITOT 1.1*  --   --  1.2*  --    ALKPHOS 189*  --   --  166*  --    ALT 15  --   --  14  --    AST 24  --   --  23  --    MG 1.8  < > 1.7 1.4* 2.3   PHOS 1.9*  < > 1.0* 1.7* 3.3   < > = values in this interval not displayed.    All pertinent labs within the past 24 hours have been reviewed.    Significant Imaging:  I have reviewed and interpreted all pertinent imaging results/findings within the past 24 hours.

## 2018-04-03 NOTE — ASSESSMENT & PLAN NOTE
- Appreciate Nephrology recs.   - Intermittent CRRT.  -Will likely need permacath placement. Will ask Gen MILLER to perform at time of PEG.

## 2018-04-03 NOTE — ASSESSMENT & PLAN NOTE
Recommend that he be treated with less sedating medications.  Seems to be doing better with a fentanyl patch, and would again avoid more sedating medications.

## 2018-04-03 NOTE — SUBJECTIVE & OBJECTIVE
Interval History:  SLED restarted early yesterday for metabolic acidosis. Beta-hydroxybutyrate elevated; possible etiology. No events overnight. Surgery to be consulted for PEG/TDC placement. Received SLED overnight with UF 200cc/hr. Net +300cc yesterday. CVP 10 this morning. Minimal hourly intake.     Review of patient's allergies indicates:   Allergen Reactions    No known drug allergies      Current Facility-Administered Medications   Medication Frequency    0.9%  NaCl infusion (for blood administration) Q24H PRN    alprazolam ODT dissolvable tablet 0.5 mg TID PRN    chlorhexidine 0.12 % solution 15 mL BID    dextrose 50% injection 12.5 g PRN    dextrose 50% injection 25 g PRN    famotidine (PF) injection 20 mg Daily    fentaNYL 25 mcg/hr 1 patch Q72H    fentaNYL injection 25 mcg Q2H PRN    glucagon (human recombinant) injection 1 mg PRN    glucose chewable tablet 16 g PRN    glucose chewable tablet 24 g PRN    heparin (porcine) injection 5,000 Units Q12H    hydrocortisone sodium succinate injection 25 mg BID    insulin aspart U-100 pen 0-4 Units PRN    insulin regular (Humulin R) 100 Units in sodium chloride 0.9% 100 mL infusion Continuous    levothyroxine injection 88 mcg Daily    ondansetron disintegrating tablet 8 mg Q6H PRN    povidone-iodine 10 % ointment PRN    sodium chloride 0.9% flush 3 mL Q8H    [START ON 4/4/2018] tacrolimus capsule 0.5 mg Daily    tacrolimus capsule 1 mg Daily       Objective:     Vital Signs (Most Recent):  Temp: 98.1 °F (36.7 °C) (04/03/18 1100)  Pulse: 109 (04/03/18 1257)  Resp: (!) 22 (04/03/18 1257)  BP: (!) 88/57 (04/03/18 1200)  SpO2: 100 % (04/03/18 1257)  O2 Device (Oxygen Therapy): ventilator (04/03/18 1257) Vital Signs (24h Range):  Temp:  [97.5 °F (36.4 °C)-98.1 °F (36.7 °C)] 98.1 °F (36.7 °C)  Pulse:  [104-122] 109  Resp:  [0-29] 22  SpO2:  [97 %-100 %] 100 %  BP: (82-95)/(51-59) 88/57  Arterial Line BP: ()/(39-63) 82/40     Weight: 62.2 kg  (137 lb 2 oz) (04/03/18 0600)  Body mass index is 21.48 kg/m².  Body surface area is 1.71 meters squared.    I/O last 3 completed shifts:  In: 6118.9 [I.V.:5818.9; IV Piggyback:300]  Out: 6979 [Other:6979]    Physical Exam   Constitutional: He appears well-developed and well-nourished.   HENT:   Head: Normocephalic and atraumatic.   Eyes: Conjunctivae and EOM are normal.   Cardiovascular: Normal rate and regular rhythm.    Pulmonary/Chest: He has no wheezes. He has no rales.   Abdominal: Soft. He exhibits no distension.   Musculoskeletal: He exhibits no edema or deformity.   Skin: Skin is warm and dry. He is not diaphoretic.       Significant Labs:  ABGs:   Recent Labs  Lab 04/03/18  0333   PH 7.415   PCO2 30.1*   HCO3 19.3*   POCSATURATED 99   BE -5     CBC:   Recent Labs  Lab 04/03/18  0859   WBC 3.27*   RBC 2.30*   HGB 7.2*   HCT 22.2*   *   MCV 97   MCH 31.3*   MCHC 32.4     CMP:   Recent Labs  Lab 04/03/18  0324   *  111*   CALCIUM 7.9*  7.9*   ALBUMIN 2.4*  2.4*   PROT 5.5*     139   K 4.3  4.3   CO2 22*  22*     107   BUN 4*  4*   CREATININE 0.4*  0.4*   ALKPHOS 166*   ALT 14   AST 23   BILITOT 1.2*     All labs within the past 24 hours have been reviewed.     Significant Imaging:  Labs: Reviewed

## 2018-04-03 NOTE — PLAN OF CARE
Problem: Physical Therapy Goal  Goal: Physical Therapy Goal  Goals to be met by: 18     Patient will increase functional independence with mobility by performin. Supine to sit with Moderate Assistance - not met  2. Sit to supine with Moderate Assistance - not met  3. Rolling to Left and Right with Minimal Assistance. - not met  4. Sit to stand transfer with Moderate Assistance - not met  5. Bed to chair transfer with Maximum Assistance - not met  6. Gait  x 20 feet with Minimal Assistance. - not met      Outcome: Ongoing (interventions implemented as appropriate)  Treatment completed and no goal met. Goals appropriate.

## 2018-04-03 NOTE — PROGRESS NOTES
[facility]  Heart Transplant  Progress Note    Patient Name: Lv Crocker  MRN: [id number]  Admission Date: 3/11/2018  Hospital Length of Stay: 22 days  Attending Physician: Heriberto Rosa MD  Primary Care Provider: Philippe Mohr MD  Principal Problem:Acute respiratory failure with hypoxia    Subjective:     Interval History: Feeling a bit better this am. Less anxious/nauseous.     Continuous Infusions:   sodium chloride 0.9% 200 mL/hr at 04/03/18 0700    insulin (HUMAN R) infusion (adults) 0.3 Units/hr (04/03/18 0600)     Scheduled Meds:   chlorhexidine  15 mL Mouth/Throat BID    famotidine (PF)  20 mg Intravenous Daily    heparin (porcine)  5,000 Units Subcutaneous Q12H    hydrocortisone sodium succinate  25 mg Intravenous BID    levothyroxine  88 mcg Intravenous Daily    sodium chloride 0.9%  3 mL Intravenous Q8H    tacrolimus  0.5 mg Sublingual BID     PRN Meds:sodium chloride, alprazolam ODT, dextrose 50%, dextrose 50%, fentaNYL, glucagon (human recombinant), glucose, glucose, insulin aspart U-100, ondansetron    Review of patient's allergies indicates:   Allergen Reactions    No known drug allergies      Objective:     Vital Signs (Most Recent):  Temp: 97.8 °F (36.6 °C) (04/03/18 0700)  Pulse: (!) 115 (04/03/18 0743)  Resp: 14 (04/03/18 0743)  BP: (!) 103/59 (04/02/18 1300)  SpO2: 100 % (04/03/18 0743) Vital Signs (24h Range):  Temp:  [97.5 °F (36.4 °C)-97.9 °F (36.6 °C)] 97.8 °F (36.6 °C)  Pulse:  [104-126] 115  Resp:  [0-43] 14  SpO2:  [97 %-100 %] 100 %  BP: (103)/(59) 103/59  Arterial Line BP: ()/(41-63) 105/56     Patient Vitals for the past 72 hrs (Last 3 readings):   Weight   04/03/18 0600 62.2 kg (137 lb 2 oz)   04/02/18 1100 65.9 kg (145 lb 4.5 oz)   04/02/18 0300 65.9 kg (145 lb 4.5 oz)     Body mass index is 21.48 kg/m².      Intake/Output Summary (Last 24 hours) at 04/03/18 0759  Last data filed at 04/03/18 0700   Gross per 24 hour   Intake           3942.56 ml   Output             3616 ml   Net           326.56 ml     Telemetry: ST    Physical Exam   Constitutional:   Chronically ill    HENT:   Head: Normocephalic and atraumatic.   Eyes: Conjunctivae are normal. Pupils are equal, round, and reactive to light.   Neck: No JVD present. No thyromegaly present.   RIJ trialysis   Tracheostomy (C/D/I)   Cardiovascular: Regular rhythm.    Tachycardic   Pulmonary/Chest:   Coarse breath sounds throughout   Mostly clear    Abdominal: Soft. Bowel sounds are normal.   Musculoskeletal:   Trace - 1+ gen edema   Neurological:   AAO x 3.   Skin: Skin is warm and dry. Capillary refill takes less than 2 seconds. There is pallor.     Significant Labs:  CBC:    Recent Labs  Lab 04/01/18  1954 04/02/18  1037 04/02/18 2043   WBC 2.93* 5.02 2.98*   RBC 2.62* 2.56* 2.32*   HGB 8.0* 7.9* 7.4*   HCT 25.2* 25.7* 22.3*   * 150 122*   MCV 96 100* 96   MCH 30.5 30.9 31.9*   MCHC 31.7* 30.7* 33.2     CMP:    Recent Labs  Lab 04/01/18  0313  04/02/18  0314 04/02/18  1407 04/02/18  2207 04/03/18  0324   *  124*  < > 138*  138* 176* 108 111*  111*   CALCIUM 8.0*  8.0*  < > 8.3*  8.3* 8.2* 8.1* 7.9*  7.9*   ALBUMIN 2.5*  2.5*  < > 2.6*  2.6* 2.5* 2.4* 2.4*  2.4*   PROT 6.0  --  6.3  --   --  5.5*     139  < > 137  137 137 138 139  139   K 4.8  4.8  < > 4.9  4.9 5.0 4.5 4.3  4.3   CO2 22*  22*  < > 21*  21* 17* 21* 22*  22*     107  < > 105  105 106 106 107  107   BUN 5*  5*  < > 5*  5* 11 4* 4*  4*   CREATININE 0.6  0.6  < > 0.7  0.7 0.8 0.5 0.4*  0.4*   ALKPHOS 185*  --  189*  --   --  166*   ALT 15  --  15  --   --  14   AST 25  --  24  --   --  23   BILITOT 1.3*  --  1.1*  --   --  1.2*   < > = values in this interval not displayed.     Microbiology:  Microbiology Results (last 7 days)     Procedure Component Value Units Date/Time    Culture, Anaerobe [[id number]] Collected:  03/28/18 1607    Order Status:  Completed Specimen:  Body Fluid  from Lung, RLL Updated:  04/02/18 1251     Anaerobic Culture No anaerobes isolated    Aerobic culture [[id number]] Collected:  03/28/18 1607    Order Status:  Completed Specimen:  Body Fluid from Lung, RLL Updated:  03/31/18 1209     Aerobic Bacterial Culture No growth    Blood culture [[id number]] Collected:  03/25/18 1554    Order Status:  Completed Specimen:  Blood from Peripheral, Upper Arm, Right Updated:  03/30/18 1812     Blood Culture, Routine No growth after 5 days.    Blood culture [[id number]] Collected:  03/25/18 1606    Order Status:  Completed Specimen:  Blood from Peripheral, Forearm, Left Updated:  03/30/18 1812     Blood Culture, Routine No growth after 5 days.    AFB Culture & Smear [[id number]] Collected:  03/28/18 1607    Order Status:  Completed Specimen:  Body Fluid from Lung, RLL Updated:  03/29/18 2127     AFB Culture & Smear Culture in progress     AFB CULTURE STAIN No acid fast bacilli seen.    Gram stain [[id number]] Collected:  03/28/18 1607    Order Status:  Completed Specimen:  Body Fluid from Lung, RLL Updated:  03/28/18 1654     Gram Stain Result Rare WBC's      No organisms seen    Fungus culture [[id number]] Collected:  03/28/18 1607    Order Status:  Sent Specimen:  Body Fluid from Lung, RLL Updated:  03/28/18 1617    Fungus culture [[id number]] Collected:  03/14/18 1137    Order Status:  Completed Specimen:  Bronchial Wash from Amniotic Fluid Updated:  03/28/18 0933     Fungus (Mycology) Culture Culture in progress     Fungus (Mycology) Culture No fungus isolated after 2 weeks        I have reviewed all pertinent labs within the past 24 hours.    Estimated Creatinine Clearance: 207.3 mL/min (A) (based on SCr of 0.4 mg/dL (L)).    Diagnostic Results:  I have reviewed all pertinent imaging results/findings within the past 24 hours.    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 Most recently has had ongoing issues with his lungs, had gotten 2 thoracenteses, after which he underwent VATS 01/19/18 followed by pleurex catheter placement and effusion drainage 01/11/18, subsequently had it removed 02/07/18 after drainage had decreased despite multiple attempts to drain it. Has been having significant coughing fits that result in him being near-syncopal at times, although difficult to say if he has passed out or not- patient most recently was admitted to the hospital for increased AGUILAR, coughing and pre-syncope 02/11-02/14/18 [facility].   Patient was started on antibiotics for suspected bacterial infection, with some diarrhea, bronchitis, and possible pneumonia. Ct chest showed some pleural fluid collection and after talking with Dr. James, we arranged for him to receive a diagnostic tap with IR, from which the fluid collection demonstrated no growth or significant findings at all really. Cell counts do not appear to have been sent but will recheck. Patient states since his hospital stay, yesterday had a significant coughing fit again, after which he nearly passed out, is being seen in clinic today for this. Denies any cardiopulmonary complaints, no leg swelling, has some abdominal swelling, which is moderate for him. Denies significant shortness of breath with mild exertion, had 6MWT yesterday to see if he qualified for home O2, and his O2 sats actually improved after recovery from where he started initially. He and his wife admit he is in bed most days, not very active.     Mr. Crocker presented to the ED 3/11/18 with approximately 3 weeks of worsening diarrhea and 2-3 days of sinus pressure, drainage, and worsened cough.  He notes that he had a coughing fit last night and passed out, coughed throughout most of the night.  This also happened on 2/25/18.  He last saw Dr Ferreira in clinic on 2/20/18 where he  was taken off myfortic and switched to cellcept (he hadn't been on cellcept because of leukopenia when they tried post-transplant).  Though his diarrhea had improved after his last discharge, he states it has increased now to 15x/day, clear/yellow/green, no fevers, no exacerbating foods per say.  He was taken back off the cellcept and placed back on myfortic this past week and has not noticed immediate change in diarrhea. He also reports his baseline coughing fits havent improved and are minimally responsive to tessalon pearls.  Came on last night, he lost consciousness during a fit once which is relatively normal for him, and had two more during HPI.  He notes no sick contacts but does state he's had some URI symptoms as above.  His baseline vitals are usually -120 and BP 90s/60s-110s/70s.  He reports a history of intermittent diarrhea - did have Cdiff many years ago but this smells different.  No abdominal pain, no nausea, no vomiting.  No blood in diarrhea.  Appears last c-scope in 2015 with no evidence of infection or inflammatory processes.  He does report IBS which is different that this.       * Acute respiratory failure with hypoxia    - S/P Bronch and intubation 3/14 now post tracheostomy due to inability to extubate   - Pulmonology following to help manage vent wean. Appreciate Recs   - RSV positive early in hospitalization. Completed course of Ribavarin/IVIG. Per lung transplant protocol for severe RSV (given myelosuppression).   - ID following. Completed 7 day course of Meropenem/Linezolid.   - All cultures remain negative.         Acute renal failure superimposed on stage 4 chronic kidney disease    - Appreciate Nephrology recs.   - Intermittent CRRT.  -Will likely need permacath placement. Will ask Gen PAUL to perform at time of PEG.         Heart transplanted    - TTE 3/26/18 showed normal graft function but has known history of restrictive CM post transplant.  - Continue hydrocortisone 25mg BID  for now.  - Continue Tacro .5mg BID (sublingual) until enteral acces regained.  - Cellcept remains on hold.        Restrictive cardiomyopathy    - Has known history of recurrent ascites and pleural effusions   - S/P thoracentesis X 2, followed by VATS/pleurex cath placement (January 2018) with removal of pleurex (February 2018) and 3rd thoracentesis 2/14/18 with no significant findings on fluid removal.        Type 1 diabetes mellitus with stage 3 chronic kidney disease    - Appreciate Endocrine's recs  - Insulin gtt  - Recent blood transfusions will likely affect A1cs (will appear lower than they likely are)         Hypothyroidism due to Hashimoto's thyroiditis    - Appreciate Endocrine's recs  - Continue IV levothyroxine 88mcg        Anemia    - HH pending this am.          Debility    - PT/OT daily   - This will take time and require adequate nutrition  - Pre-albumin tomorrow   - Pt against Lavelle tube. Spoke to pt and wife at length regarding need for nutrition. They want to consider PEG tube. Will get Gen PAUL Cx for PEG(and permacath placement as well).         Anxiety    - Pt on nortriptyline/escitalopram at home. Held as no enteric feeding option at this point.   - Continue prn low dose xanax ODT.           Critical Care Time: 40 minutes     Critical care was time spent personally by me on the following activities: development of treatment plan with patient or surrogate and bedside caregivers, discussions with consultants, evaluation of patient's response to treatment, examination of patient, ordering and performing treatments and interventions, ordering and review of laboratory studies, ordering and review of radiographic studies, pulse oximetry, re-evaluation of patient's condition. This critical care time did not overlap with that of any other provider or involve time for any procedures.      Tim Mao NP  Heart Transplant  [facility]

## 2018-04-03 NOTE — SUBJECTIVE & OBJECTIVE
No current facility-administered medications on file prior to encounter.      Current Outpatient Prescriptions on File Prior to Encounter   Medication Sig    aspirin (ECOTRIN) 81 MG EC tablet Take 1 tablet (81 mg total) by mouth once daily.    escitalopram oxalate (LEXAPRO) 20 MG tablet Take 1 tablet (20 mg total) by mouth once daily.    gabapentin (NEURONTIN) 300 MG capsule Take 3 capsules (900 mg total) by mouth 3 (three) times daily.    insulin aspart (NOVOLOG) 100 unit/mL InPn pen Inject 4 Units into the skin 3 (three) times daily.    insulin detemir (LEVEMIR FLEXTOUCH) 100 unit/mL (3 mL) SubQ InPn pen Inject 15 Units into the skin every evening. (Patient taking differently: Inject 15 Units into the skin 2 (two) times daily. )    lancets Misc 1 Device by Misc.(Non-Drug; Combo Route) route 4 (four) times daily with meals and nightly.    levothyroxine (SYNTHROID) 150 MCG tablet Take 1 tablet (150 mcg total) by mouth every morning.    magnesium oxide (MAG-OX) 400 mg tablet Take 1 tablet (400 mg total) by mouth once daily.    mycophenolate (MYFORTIC) 360 MG TbEC Take 2 tablets (720 mg total) by mouth 2 (two) times daily.    nortriptyline (PAMELOR) 25 MG capsule Take 1 capsule (25 mg total) by mouth every evening.    ondansetron (ZOFRAN-ODT) 4 MG TbDL Take 2 tablets (8 mg total) by mouth every 8 (eight) hours as needed (nausea).    pantoprazole (PROTONIX) 40 MG tablet TAKE ONE TABLET BY MOUTH EVERY DAY    pravastatin (PRAVACHOL) 40 MG tablet Take 1 tablet (40 mg total) by mouth every evening. (Patient taking differently: Take 40 mg by mouth once daily. )    predniSONE (DELTASONE) 2.5 MG tablet Take 1 tablet (2.5 mg total) by mouth once daily. S/P Heart Transplant 11/18/2014 ICD-10 Z94.1    tacrolimus (PROGRAF) 1 MG Cap Taked 3mg orally in the morning and 3mg orally in the evening. S/p heart transplant  11/18/2014  ICD-10 Z94.1    tamsulosin (FLOMAX) 0.4 mg Cp24 TAKE 2 CAPSULES(0.8 MG) BY MOUTH EVERY  "EVENING    torsemide (DEMADEX) 20 MG Tab Take 2 tablets (40 mg total) by mouth once daily.    pen needle, diabetic 32 gauge x 5/32" Ndle Uses 5 pen needles a day       Review of patient's allergies indicates:   Allergen Reactions    No known drug allergies        Past Medical History:   Diagnosis Date    Arthritis     Ascites     Thought to be 2/2 heart tpx; liver bx 3/31/17 w/o significant fibrosis    Cardiomyopathy     Depression     Controlled "for the most part" on Lexipro    Encounter for blood transfusion     during transplant    GERD (gastroesophageal reflux disease)     Hashimoto's disease     History of CHF (congestive heart failure)     Previously EF 20% (prior to heart transplant); last EF 60%, PAP 41 as of 12/12/17; throught to be 2/2 genetics & DM1    History of coronary artery disease     H/o Coronary artery disease; resolved since heart transplant 2014    History of myocardial infarction     h/o MI x3 prior to heart transplant    HLD (hyperlipidemia) 6/11/2015    Hypoglycemia unawareness in type 1 diabetes mellitus     Hypothyroid     Initially hyperthyroid 2/2 Hoshimoto's thyroiditis; now on levothyroxine 150 mcg qd    Pleural effusion due to another disorder     Thought to be 2/2 heart tpx; s/p PleuRx catheter placement and removal after 1-2 months    Pulmonary hypertension assoc with unclear multi-factorial mechanisms 12/12/2017    PAP 41 (EF 60%) on ECHO 12/12/17    Syncope 6/30/2015    Type I diabetes mellitus, well controlled     Well controlled; Dx'd @5y/o; on N insulin 20U BID & Humalog 10U w/meals +SSI; Glu 89 11/9/17; A1c 7.2% 4/5/17    Unspecified essential hypertension 05/29/2014    Well controlled; Last /57 on 11/9/17     Past Surgical History:   Procedure Laterality Date    CARDIAC CATHETERIZATION      CARDIAC SURGERY      CHOLECYSTECTOMY      COLONOSCOPY N/A 3/13/2018    Procedure: COLONOSCOPY;  Surgeon: Tim Spencer MD;  [facility] ([facility]);  Service: Endoscopy;  Laterality: N/A;    CORONARY ANGIOPLASTY      FOOT SPLIT TRANSFER OF THE POSTERIOR TIBIALIS TENDON PROCEDURE      HEART TRANSPLANT  2014    KNEE SURGERY      PleuRx catheter placement  01/11/2018    Plan for removal after 1-2 months by Dr. James in cardiothoracic surgery    s/p oht  November 2014    stent placemnet       Family History     Problem Relation (Age of Onset)    Cancer Brother (50)    Diabetes Mother, Father, Brother, Son, Sister, Maternal Uncle, Paternal Aunt, Maternal Grandmother, Maternal Grandfather    Heart disease Mother, Brother    Hypertension Mother, Father, Brother    Kidney disease Mother, Father    Stroke Father, Brother    Vision loss Brother        Social History Main Topics    Smoking status: Never Smoker    Smokeless tobacco: Never Used    Alcohol use No    Drug use: No    Sexual activity: Yes     Partners: Female     Review of Systems   Reason unable to perform ROS: trach in place.     Objective:     Vital Signs (Most Recent):  Temp: 98.1 °F (36.7 °C) (04/03/18 1100)  Pulse: (!) 114 (04/03/18 1533)  Resp: 20 (04/03/18 1533)  BP: 108/66 (04/03/18 1533)  SpO2: 100 % (04/03/18 1533) Vital Signs (24h Range):  Temp:  [97.7 °F (36.5 °C)-98.1 °F (36.7 °C)] 98.1 °F (36.7 °C)  Pulse:  [104-122] 114  Resp:  [0-29] 20  SpO2:  [97 %-100 %] 100 %  BP: ()/(51-66) 108/66  Arterial Line BP: ()/(39-63) 82/40     Weight: 62.2 kg (137 lb 2 oz)  Body mass index is 21.48 kg/m².    Physical Exam   Constitutional: He appears well-developed and well-nourished. No distress.   HENT:   Head: Normocephalic and atraumatic.   Eyes: EOM are normal. No scleral icterus.   Neck: Normal range of motion.   Trach in place    Cardiovascular:   tachycardic   Pulmonary/Chest: Effort normal. No respiratory distress.   Abdominal:   Soft, NT, previous chest tube scars noted  No distension, no fluid wave   Musculoskeletal: He exhibits no edema or tenderness.   Neurological:  He is alert.   Skin: Skin is warm and dry.       Significant Labs:  CBC:   Recent Labs  Lab 04/03/18  0859   WBC 3.27*   RBC 2.30*   HGB 7.2*   HCT 22.2*   *   MCV 97   MCH 31.3*   MCHC 32.4     BMP:   Recent Labs  Lab 04/03/18  1428   *      K 4.6      CO2 16*   BUN 11   CREATININE 0.9   CALCIUM 8.1*   MG 2.3     CMP:   Recent Labs  Lab 04/03/18  0324 04/03/18  1428   *  111* 189*   CALCIUM 7.9*  7.9* 8.1*   ALBUMIN 2.4*  2.4* 2.4*   PROT 5.5*  --      139 138   K 4.3  4.3 4.6   CO2 22*  22* 16*     107 106   BUN 4*  4* 11   CREATININE 0.4*  0.4* 0.9   ALKPHOS 166*  --    ALT 14  --    AST 23  --    BILITOT 1.2*  --      LFTs:   Recent Labs  Lab 04/03/18 0324 04/03/18  1428   ALT 14  --    AST 23  --    ALKPHOS 166*  --    BILITOT 1.2*  --    PROT 5.5*  --    ALBUMIN 2.4*  2.4* 2.4*     ABGs:   Recent Labs  Lab 04/03/18  0333   PH 7.415   PCO2 30.1*   PO2 128*   HCO3 19.3*   POCSATURATED 99   BE -5

## 2018-04-03 NOTE — ANESTHESIA PREPROCEDURE EVALUATION
04/03/2018  Pre-operative evaluation for Procedure(s) (LRB):  INSERTION-CATHETER-PERM-A-CATH (N/A)  PLACEMENT-TUBE-PEG (N/A)  INSERTION-TUBE-GASTROSTOMY (N/A)    Lv Crocker is a 42 y.o. male with PMH of OHTX in November 2014, Hashimoto's thyroiditis, T1DM, CKD and history of recurrent acsites and pleural effusions now admitted for respiratory failure from severe RSV. He underwent a right VATS drainage of effusion and Pleurx placement on 1/11/18 by Dr. James. Pleurx was removed in clinic on 2/7/18 due to minimal output and potential clogging. Recently admitted on 3/13/18 after he was found to be febrile and hypoxic in endoscopy lab.  Intubated on 3/14/18 for hypoxemic and hypercapnic resp failure. Chest CT at that time showed small right pleural effusion and bilateral subsegmental multifocal consolidation with air bronchograms more extensive in left lung. Since then he has had a complicated hospital stay for presumed severe RSV including acute kidney failure on CRRT, intermittent pressor requirement, DIC and several failed SBTs.    Plan now for above procedure.    LDA:   Trach Shiley  R IJ trialysis   R forearm 22g    Prev airway:   Intubation    Diagnosis: Respiratory Failure   Patient location during procedure: ICU  Procedure start time: 3/14/2018 9:20 AM  Timeout: 3/14/2018 9:15 AM  Procedure end time: 3/14/2018 9:26 AM  Staffing  Anesthesiologist: KATALINA STEVENS JR  Resident/CRNA: JA GARCIA  Performed: resident/CRNA   Anesthesiologist was present at the time of the procedure.  Preanesthetic Checklist  Completed: patient identified, surgical consent, pre-op evaluation, timeout performed, IV checked, risks and benefits discussed, monitors and equipment checked and anesthesia consent given  Intubation  Indication: respiratory failure  Pre-oxygenation. Induction: intravenous,  mask ventilation: moderately difficult with oral airway.  Intubation: postinduction, laryngoscopy direct, Johnson 2.  Endotracheal Tube: oral, 8.0 mm ID, cuffed (inflated to minimal occlusive pressure)  Attempts: 1, Grade I - full view of cords  Complicating Factors: anterior larynx, large/floppy epiglottis, obesity and short neck (large amounts of fluid/saliva in oropharynx )  Tube secured at 25 cm at the teeth.  Findings post-intubation: bilateral breath sounds, positive ETCO2, atraumatic / condition of teeth unchanged  Position Confirmation: auscultation    Drips: Insulin    Patient Active Problem List   Diagnosis    Current use of steroid medication    Need for prophylactic immunotherapy    Tricuspid valve disorders, specified as nonrheumatic    Anxiety    PVD (peripheral vascular disease)    GERD (gastroesophageal reflux disease)    Debility    Sciatica    Disorder of magnesium metabolism    Subacute effusive constrictive pericarditis    Heart transplanted    Anemia    Acute renal failure superimposed on stage 4 chronic kidney disease    History of restrictive cardiomyopathy    CMV infection, acute    Hypothyroidism due to Hashimoto's thyroiditis    Thoracic compression fracture    Anemia of chronic renal failure, stage 3 (moderate)    Transplant follow-up    Type 1 diabetes mellitus with stage 3 chronic kidney disease    Hyperlipidemia LDL goal <70    Iron deficiency anemia    Other ascites    Osteopenia    Fatigue    Positive urine culture    Pleural effusion    Restrictive cardiomyopathy    Potential impairment of skin integrity    Hypoxia    Pneumonia    Acute respiratory failure with hypoxia    CKD (chronic kidney disease), stage IV    Alteration in skin integrity    Acute encephalopathy    Current chronic use of systemic steroids       Review of patient's allergies indicates:   Allergen Reactions    No known drug allergies         No current facility-administered  medications on file prior to encounter.      Current Outpatient Prescriptions on File Prior to Encounter   Medication Sig Dispense Refill    aspirin (ECOTRIN) 81 MG EC tablet Take 1 tablet (81 mg total) by mouth once daily. 30 tablet 11    escitalopram oxalate (LEXAPRO) 20 MG tablet Take 1 tablet (20 mg total) by mouth once daily. 30 tablet 11    gabapentin (NEURONTIN) 300 MG capsule Take 3 capsules (900 mg total) by mouth 3 (three) times daily. 90 capsule 1    insulin aspart (NOVOLOG) 100 unit/mL InPn pen Inject 4 Units into the skin 3 (three) times daily. 3.6 mL 11    insulin detemir (LEVEMIR FLEXTOUCH) 100 unit/mL (3 mL) SubQ InPn pen Inject 15 Units into the skin every evening. (Patient taking differently: Inject 15 Units into the skin 2 (two) times daily. ) 4.5 mL 11    lancets Misc 1 Device by Misc.(Non-Drug; Combo Route) route 4 (four) times daily with meals and nightly. 100 each 5    levothyroxine (SYNTHROID) 150 MCG tablet Take 1 tablet (150 mcg total) by mouth every morning. 30 tablet 12    magnesium oxide (MAG-OX) 400 mg tablet Take 1 tablet (400 mg total) by mouth once daily. 30 tablet 11    mycophenolate (MYFORTIC) 360 MG TbEC Take 2 tablets (720 mg total) by mouth 2 (two) times daily. 120 tablet 11    nortriptyline (PAMELOR) 25 MG capsule Take 1 capsule (25 mg total) by mouth every evening. 30 capsule 3    ondansetron (ZOFRAN-ODT) 4 MG TbDL Take 2 tablets (8 mg total) by mouth every 8 (eight) hours as needed (nausea). 15 tablet 0    pantoprazole (PROTONIX) 40 MG tablet TAKE ONE TABLET BY MOUTH EVERY DAY 30 tablet 11    pravastatin (PRAVACHOL) 40 MG tablet Take 1 tablet (40 mg total) by mouth every evening. (Patient taking differently: Take 40 mg by mouth once daily. ) 30 tablet 11    predniSONE (DELTASONE) 2.5 MG tablet Take 1 tablet (2.5 mg total) by mouth once daily. S/P Heart Transplant 11/18/2014 ICD-10 Z94.1 30 tablet 11    tacrolimus (PROGRAF) 1 MG Cap Taked 3mg orally in the  "morning and 3mg orally in the evening. S/p heart transplant  11/18/2014  ICD-10 Z94.1 60 capsule 0    tamsulosin (FLOMAX) 0.4 mg Cp24 TAKE 2 CAPSULES(0.8 MG) BY MOUTH EVERY EVENING 60 capsule 11    torsemide (DEMADEX) 20 MG Tab Take 2 tablets (40 mg total) by mouth once daily. 60 tablet 11    pen needle, diabetic 32 gauge x 5/32" Ndle Uses 5 pen needles a day 200 each 12       Past Surgical History:   Procedure Laterality Date    CARDIAC CATHETERIZATION      CARDIAC SURGERY      CHOLECYSTECTOMY      COLONOSCOPY N/A 3/13/2018    Procedure: COLONOSCOPY;  Surgeon: Tim Spencer MD;  Location: [facility] ([address] [place] [address]);  Service: Endoscopy;  Laterality: N/A;    CORONARY ANGIOPLASTY      FOOT SPLIT TRANSFER OF THE POSTERIOR TIBIALIS TENDON PROCEDURE      HEART TRANSPLANT  2014    KNEE SURGERY      PleuRx catheter placement  01/11/2018    Plan for removal after 1-2 months by Dr. James in cardiothoracic surgery    s/p oht  November 2014    stent placemnet         Social History     Social History    Marital status:      Spouse name: N/A    Number of children: N/A    Years of education: N/A     Occupational History    Not on file.     Social History Main Topics    Smoking status: Never Smoker    Smokeless tobacco: Never Used    Alcohol use No    Drug use: No    Sexual activity: Yes     Partners: Female     Other Topics Concern    Not on file     Social History Narrative            Breakfast: Skips 80%; cereal; Diet Sprite    Lunch: Turkey or chicken sandwich on white bread; Diet Sprite    Dinner: Grilled burgers, small amount chips; Diet Sprite    Snacks: Ronny crackers before bed on most nights    Eats out: 2x/wk    Water: 0    PA: Walks 15 minutes (strenuous) daily    Goal weight 170-175 lbs by 1/2018         Vital Signs Range (Last 24H):  Temp:  [36.5 °C (97.7 °F)-36.7 °C (98.1 °F)]   Pulse:  [104-124]   Resp:  [0-39]   BP: ()/(51-74)   SpO2:  [97 %-100 %]   Arterial Line " BP: ()/(39-63)       CBC:   Recent Labs      04/02/18   2043  04/03/18   0859   WBC  2.98*  3.27*   RBC  2.32*  2.30*   HGB  7.4*  7.2*   HCT  22.3*  22.2*   PLT  122*  126*   MCV  96  97   MCH  31.9*  31.3*   MCHC  33.2  32.4       CMP:   Recent Labs      04/02/18   0314   04/03/18   0324  04/03/18   1428   NA  137  137   < >  139  139  138   K  4.9  4.9   < >  4.3  4.3  4.6   CL  105  105   < >  107  107  106   CO2  21*  21*   < >  22*  22*  16*   BUN  5*  5*   < >  4*  4*  11   CREATININE  0.7  0.7   < >  0.4*  0.4*  0.9   GLU  138*  138*   < >  111*  111*  189*   MG  1.8   < >  1.4*  2.3   PHOS  1.9*   < >  1.7*  3.3   CALCIUM  8.3*  8.3*   < >  7.9*  7.9*  8.1*   ALBUMIN  2.6*  2.6*   < >  2.4*  2.4*  2.4*   PROT  6.3   --   5.5*   --    ALKPHOS  189*   --   166*   --    ALT  15   --   14   --    AST  24   --   23   --    BILITOT  1.1*   --   1.2*   --     < > = values in this interval not displayed.       INR  No results for input(s): PT, INR, PROTIME, APTT in the last 72 hours.    Diagnostic Studies:  CXR 3/30/18: No significant change when compared to prior exam.    Extensive postsurgical changes of the sternum and stable tracheostomy.  Right-sided central venous catheter with its tip in the inferior SVC.  No cardiomegaly.  Small right pleural effusion similar to prior exam with increased interstitial lung markings of the right lower lung zone which may represent atelectasis or pneumonia.  Left lung is well aerated.  No pneumothorax.  Osseous structures are unremarkable.    EKG 3/14/18:  Sinus tachycardia  Rightward axis  ST and T wave abnormality, consider inferolateral ischemia  Abnormal ECG  When compared with ECG of 11-MAR-2018 18:41,  T wave inversion less evident in Anterior leads  Confirmed by COURTNEY ABRAHAM, HOMEYAR ([id number]) on 3/15/2018 11:03:41 AM    2D Echo 3/26/18:  CONCLUSIONS     1 - Normal left ventricular systolic function (EF 55-60%).     2 - Normal left ventricular  diastolic function.     3 - Low normal right ventricular systolic function .     4 - Mild tricuspid regurgitation.     Anesthesia Evaluation    I have reviewed the Patient Summary Reports.    I have reviewed the Nursing Notes.   I have reviewed the Medications.   Steroids Taken In Past Year: Prednisone    Review of Systems  Anesthesia Hx:  No problems with previous Anesthesia  History of prior surgery of interest to airway management or planning: Previous anesthesia: General  Procedure performed at an [facility]. Airway issues documented on chart review include mask, easy  Denies Family Hx of Anesthesia complications.   Denies Personal Hx of Anesthesia complications.   Hematology/Oncology:     Oncology Normal   Hematology Comments: Recent DIC   EENT/Dental:EENT/Dental Normal   Cardiovascular:   Hypertension Past MI CAD   CHF hyperlipidemia ECG has been reviewed. CHF s/p OHTX 2/2 cardiomyopathy in November 2014   Pulmonary:   Pleural effusions s/p VATs and pleurx  hypoxemic and hypercapnic resp failure likely 2/2 severe RSV infection  Failed several SBT   Renal/:   Chronic Renal Disease, ARF, Dialysis    Hepatic/GI:   GERD recurrent acsites   Musculoskeletal:  Musculoskeletal Normal    Neurological:   Neuromuscular Disease,    Endocrine:   Diabetes, type 1 Hypothyroidism Hashimoto's thyroiditis   Psych:   Psychiatric History depression          Physical Exam  General:  Well nourished    Airway/Jaw/Neck:  Airway Findings: Trach in place.      Chest/Lungs:  Chest/Lungs Findings: Clear to auscultation, Normal Respiratory Rate, Decreased Breath Sounds Bilateral     Heart/Vascular:  Heart Findings: Rate: Tachycardia  Rhythm: Regular Rhythm        Mental Status:  Mental Status Findings: (Alert)         Anesthesia Plan  Type of Anesthesia, risks & benefits discussed:  Anesthesia Type:  general  Patient's Preference:   Intra-op Monitoring Plan: standard ASA monitors  Intra-op Monitoring Plan Comments:   Post Op Pain  Control Plan: IV/PO Opioids PRN  Post Op Pain Control Plan Comments:   Induction:   IV  Beta Blocker:  Patient is not currently on a Beta-Blocker (No further documentation required).       Informed Consent: Patient representative understands risks and agrees with Anesthesia plan.  Questions answered. Anesthesia consent signed with patient representative.  ASA Score: 4     Day of Surgery Review of History & Physical:    H&P update referred to the surgeon.         Ready For Surgery From Anesthesia Perspective.

## 2018-04-03 NOTE — ASSESSMENT & PLAN NOTE
- S/P Bronch and intubation 3/14 now post tracheostomy due to inability to extubate   - Pulmonology following to help manage vent wean. Appreciate Recs   - RSV positive early in hospitalization. Completed course of Ribavarin/IVIG. Per lung transplant protocol for severe RSV (given myelosuppression).   - ID following. Completed 7 day course of Meropenem/Linezolid.   - All cultures remain negative.

## 2018-04-03 NOTE — ASSESSMENT & PLAN NOTE
- Pt on nortriptyline/escitalopram at home. Held as no enteric feeding option at this point.   - Continue prn low dose xanax ODT.

## 2018-04-03 NOTE — PLAN OF CARE
Problem: Patient Care Overview  Goal: Plan of Care Review  Outcome: Ongoing (interventions implemented as appropriate)  NAEON. See flowsheets for vital signs and assessments. See below for updates on progress made.       Neuro: drowsier and slept most of shift; easily awakened; follows commands    Cardiovascular: ST 100s; SBP 90-110s with MAPs around 70; CVP 2-4      Pulmonary: trach on vent; AC, rate 22, , PEEP 5, FiO2 25%    Gastrointestinal: no BM    Genitourinary: anuric; CRRT    Endocrine: on CRRT; phos critically low at 1.0, being replaced with 30mmol; mag 1.4, to be replaced with IVPB mag; accuchecks q4 with BGs in 110s    Integumentary/other: wound care per orders    Infusions: insulin at 0.3    POC: surgery consult for PEG placement    Patient progressing towards goals as tolerated, plan of care communicated and reviewed with Lv Crocker and wife, Elizabeth, at bedside. All concerns addressed. Will continue to monitor.

## 2018-04-03 NOTE — ASSESSMENT & PLAN NOTE
- PT/OT daily   - This will take time and require adequate nutrition  - Pre-albumin tomorrow   - Pt against Lavelle tube. Spoke to pt and wife at length regarding need for nutrition. They want to consider PEG tube. Will get Gen PAUL Cx for PEG(and permacath placement as well).

## 2018-04-04 PROBLEM — E03.9 HYPOTHYROIDISM: Status: ACTIVE | Noted: 2018-01-01

## 2018-04-04 NOTE — PROGRESS NOTES
[facility]  Heart Transplant  Progress Note    Patient Name: Lv Crocker  MRN: [id number]  Admission Date: 3/11/2018  Hospital Length of Stay: 23 days  Attending Physician: Heriberto Rosa MD  Primary Care Provider: Philippe Mohr MD  Principal Problem:Acute respiratory failure with hypoxia    Subjective:     Interval History: pain better with addition of fentanyl, to OR today for PEG    Continuous Infusions:   insulin (HUMAN R) infusion (adults) 0.1 Units/hr (04/04/18 1400)     Scheduled Meds:   chlorhexidine  15 mL Mouth/Throat BID    famotidine (PF)  20 mg Intravenous Daily    fentaNYL  1 patch Transdermal Q72H    heparin (porcine)  5,000 Units Subcutaneous Q12H    hydrocortisone sodium succinate  25 mg Intravenous BID    levothyroxine  88 mcg Intravenous Daily    sodium chloride 0.9%  3 mL Intravenous Q8H    tacrolimus  0.5 mg Sublingual Daily    tacrolimus  1 mg Sublingual Daily     PRN Meds:sodium chloride, alprazolam ODT, dextrose 50%, dextrose 50%, fentaNYL, glucagon (human recombinant), glucose, glucose, insulin aspart U-100, ondansetron, povidone-iodine    Review of patient's allergies indicates:   Allergen Reactions    No known drug allergies      Objective:     Vital Signs (Most Recent):  Temp: 97.5 °F (36.4 °C) (04/04/18 1300)  Pulse: 97 (04/04/18 1400)  Resp: 15 (04/04/18 1400)  BP: (!) 76/43 (04/04/18 1400)  SpO2: 100 % (04/04/18 1400) Vital Signs (24h Range):  Temp:  [97.5 °F (36.4 °C)-98 °F (36.7 °C)] 97.5 °F (36.4 °C)  Pulse:  [] 97  Resp:  [12-39] 15  SpO2:  [99 %-100 %] 100 %  BP: ()/(43-74) 76/43     Patient Vitals for the past 72 hrs (Last 3 readings):   Weight   04/04/18 0300 63.1 kg (139 lb 1.8 oz)   04/03/18 0600 62.2 kg (137 lb 2 oz)   04/02/18 1100 65.9 kg (145 lb 4.5 oz)     Body mass index is 21.79 kg/m².      Intake/Output Summary (Last 24 hours) at 04/04/18 1444  Last data filed at 04/04/18 1400   Gross per 24 hour   Intake           3821.03 ml   Output             2829 ml   Net           992.03 ml       Hemodynamic Parameters:         Physical Exam   Constitutional:   Chronically ill    HENT:   Head: Normocephalic and atraumatic.   Eyes: Conjunctivae are normal. Pupils are equal, round, and reactive to light.   Neck: No JVD present. No thyromegaly present.   RIJ trialysis   Tracheostomy (C/D/I)   Cardiovascular: Regular rhythm.    Tachycardic   Pulmonary/Chest:   Coarse breath sounds throughout   Mostly clear    Abdominal: Soft. Bowel sounds are normal.   Musculoskeletal:   Trace - 1+ gen edema   Neurological:   AAO x 3.   Skin: Skin is warm and dry. Capillary refill takes less than 2 seconds. There is pallor.       Significant Labs:  CBC:    Recent Labs  Lab 04/03/18  0859 04/03/18  1959 04/04/18  0751   WBC 3.27* 4.80 3.40*   RBC 2.30* 2.32* 2.14*   HGB 7.2* 7.2* 6.6*   HCT 22.2* 22.3* 20.2*   * 127* 109*   MCV 97 96 94   MCH 31.3* 31.0 30.8   MCHC 32.4 32.3 32.7     BNP:  No results for input(s): BNP in the last 168 hours.    Invalid input(s): BNPTRIAGELBLO  CMP:    Recent Labs  Lab 04/02/18  0314  04/03/18  0324 04/03/18  1428 04/03/18  2209 04/04/18  0326   *  138*  < > 111*  111* 189* 141* 121*  121*   CALCIUM 8.3*  8.3*  < > 7.9*  7.9* 8.1* 8.1* 8.1*  8.1*   ALBUMIN 2.6*  2.6*  < > 2.4*  2.4* 2.4* 2.4* 2.4*  2.4*   PROT 6.3  --  5.5*  --   --  5.5*     137  < > 139  139 138 138 138  138   K 4.9  4.9  < > 4.3  4.3 4.6 4.2 3.8  3.8   CO2 21*  21*  < > 22*  22* 16* 24 24  24     105  < > 107  107 106 102 101  101   BUN 5*  5*  < > 4*  4* 11 8 4*  4*   CREATININE 0.7  0.7  < > 0.4*  0.4* 0.9 0.6 0.5  0.5   ALKPHOS 189*  --  166*  --   --  172*   ALT 15  --  14  --   --  17   AST 24  --  23  --   --  27   BILITOT 1.1*  --  1.2*  --   --  1.1*   < > = values in this interval not displayed.   Coagulation:   No results for input(s): PT, INR, APTT in the last 168 hours.  LDH:  No results for  input(s): LDH in the last 72 hours.  Microbiology:  Microbiology Results (last 7 days)     Procedure Component Value Units Date/Time    Culture, Anaerobe [[id number]] Collected:  03/28/18 1607    Order Status:  Completed Specimen:  Body Fluid from Lung, RLL Updated:  04/02/18 1251     Anaerobic Culture No anaerobes isolated    Aerobic culture [[id number]] Collected:  03/28/18 1607    Order Status:  Completed Specimen:  Body Fluid from Lung, RLL Updated:  03/31/18 1209     Aerobic Bacterial Culture No growth    Blood culture [[id number]] Collected:  03/25/18 1554    Order Status:  Completed Specimen:  Blood from Peripheral, Upper Arm, Right Updated:  03/30/18 1812     Blood Culture, Routine No growth after 5 days.    Blood culture [[id number]] Collected:  03/25/18 1606    Order Status:  Completed Specimen:  Blood from Peripheral, Forearm, Left Updated:  03/30/18 1812     Blood Culture, Routine No growth after 5 days.    AFB Culture & Smear [[id number]] Collected:  03/28/18 1607    Order Status:  Completed Specimen:  Body Fluid from Lung, RLL Updated:  03/29/18 2127     AFB Culture & Smear Culture in progress     AFB CULTURE STAIN No acid fast bacilli seen.    Gram stain [[id number]] Collected:  03/28/18 1607    Order Status:  Completed Specimen:  Body Fluid from Lung, RLL Updated:  03/28/18 1654     Gram Stain Result Rare WBC's      No organisms seen    Fungus culture [[id number]] Collected:  03/28/18 1607    Order Status:  Sent Specimen:  Body Fluid from Lung, RLL Updated:  03/28/18 1617          I have reviewed all pertinent labs within the past 24 hours.    Estimated Creatinine Clearance: 168.3 mL/min (based on SCr of 0.5 mg/dL).    Diagnostic Results:  I have reviewed and interpreted all pertinent imaging results/findings within the past 24 hours.    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 Most recently has had ongoing issues with his lungs, had gotten 2 thoracenteses, after which he underwent VATS 01/19/18 followed by pleurex catheter placement and effusion drainage 01/11/18, subsequently had it removed 02/07/18 after drainage had decreased despite multiple attempts to drain it. Has been having significant coughing fits that result in him being near-syncopal at times, although difficult to say if he has passed out or not- patient most recently was admitted to the hospital for increased AGUILAR, coughing and pre-syncope 02/11-02/14/18 at [facility].   Patient was started on antibiotics for suspected bacterial infection, with some diarrhea, bronchitis, and possible pneumonia. Ct chest showed some pleural fluid collection and after talking with Dr. James, we arranged for him to receive a diagnostic tap with IR, from which the fluid collection demonstrated no growth or significant findings at all really. Cell counts do not appear to have been sent but will recheck. Patient states since his hospital stay, yesterday had a significant coughing fit again, after which he nearly passed out, is being seen in clinic today for this. Denies any cardiopulmonary complaints, no leg swelling, has some abdominal swelling, which is moderate for him. Denies significant shortness of breath with mild exertion, had 6MWT yesterday to see if he qualified for home O2, and his O2 sats actually improved after recovery from where he started initially. He and his wife admit he is in bed most days, not very active.     Mr. Crocker presented to the ED 3/11/18 with approximately 3 weeks of worsening diarrhea and 2-3 days of sinus pressure, drainage, and worsened cough.  He notes that he had a coughing fit last night and passed out, coughed throughout most of the night.  This also happened on 2/25/18.  He last saw Dr Ferreira in clinic on 2/20/18 where he was taken  off myfortic and switched to cellcept (he hadn't been on cellcept because of leukopenia when they tried post-transplant).  Though his diarrhea had improved after his last discharge, he states it has increased now to 15x/day, clear/yellow/green, no fevers, no exacerbating foods per say.  He was taken back off the cellcept and placed back on myfortic this past week and has not noticed immediate change in diarrhea. He also reports his baseline coughing fits havent improved and are minimally responsive to tessalon pearls.  Came on last night, he lost consciousness during a fit once which is relatively normal for him, and had two more during HPI.  He notes no sick contacts but does state he's had some URI symptoms as above.  His baseline vitals are usually -120 and BP 90s/60s-110s/70s.  He reports a history of intermittent diarrhea - did have Cdiff many years ago but this smells different.  No abdominal pain, no nausea, no vomiting.  No blood in diarrhea.  Appears last c-scope in 2015 with no evidence of infection or inflammatory processes.  He does report IBS which is different that this.       * Acute respiratory failure with hypoxia    - S/P Bronch and intubation 3/14 now post tracheostomy due to inability to extubate   - Pulmonology following to help manage vent wean. Appreciate Recs   - RSV positive early in hospitalization. Completed course of Ribavarin/IVIG. Per lung transplant protocol for severe RSV (given myelosuppression).   - ID following. Completed 7 day course of Meropenem/Linezolid.   - All cultures remain negative.         Restrictive cardiomyopathy    - Has known history of recurrent ascites and pleural effusions   - S/P thoracentesis X 2, followed by VATS/pleurex cath placement (January 2018) with removal of pleurex (February 2018) and 3rd thoracentesis 2/14/18 with no significant findings on fluid removal.        Type 1 diabetes mellitus with stage 3 chronic kidney disease    - Appreciate  Endocrine's recs  - Insulin gtt  - Recent blood transfusions will likely affect A1cs (will appear lower than they likely are)         Hypothyroidism due to Hashimoto's thyroiditis    - Appreciate Endocrine's recs  - Continue IV levothyroxine 88mcg        Acute renal failure superimposed on stage 4 chronic kidney disease    - Appreciate Nephrology recs.   - Intermittent CRRT.  -permacath placement today with PEG        Anemia    - transfuse one unit this morning          Heart transplanted    - TTE 3/26/18 showed normal graft function but has known history of restrictive CM post transplant.  - Continue hydrocortisone 25mg BID for now.  - Continue Tacro .5mg BID (sublingual) until enteral acces regained.  - Cellcept remains on hold.        Debility    - PT/OT daily   - This will take time and require adequate nutrition  - PEG tube today.         Anxiety    - Pt on nortriptyline/escitalopram at home. Held as no enteric feeding option at this point.   - Continue prn low dose xanax ODT.             JOSE DANIEL Zabala  Heart Transplant  [facility]

## 2018-04-04 NOTE — SUBJECTIVE & OBJECTIVE
Interval History:   Received SLED x 12 hours last night. UF ran at 200-250cc/hr. Net +300cc yesterday. No events overnight. Going to OR today for PEG and TDC. Hgb 6.6; planning for blood transfusion today.     Review of patient's allergies indicates:   Allergen Reactions    No known drug allergies      Current Facility-Administered Medications   Medication Frequency    0.9%  NaCl infusion (for blood administration) Q24H PRN    alprazolam ODT dissolvable tablet 0.5 mg TID PRN    chlorhexidine 0.12 % solution 15 mL BID    dextrose 50% injection 12.5 g PRN    dextrose 50% injection 25 g PRN    famotidine (PF) injection 20 mg Daily    fentaNYL 25 mcg/hr 1 patch Q72H    fentaNYL injection 25 mcg Q2H PRN    glucagon (human recombinant) injection 1 mg PRN    glucose chewable tablet 16 g PRN    glucose chewable tablet 24 g PRN    heparin (porcine) injection 5,000 Units Q12H    heparin (porcine) injection PRN    hydrocortisone sodium succinate injection 25 mg BID    insulin aspart U-100 pen 0-4 Units PRN    insulin regular (Humulin R) 100 Units in sodium chloride 0.9% 100 mL infusion Continuous    levothyroxine injection 88 mcg Daily    ondansetron disintegrating tablet 8 mg Q6H PRN    povidone-iodine 10 % ointment PRN    sodium chloride 0.9% flush 3 mL Q8H    tacrolimus capsule 0.5 mg Daily    tacrolimus capsule 1 mg Daily     Facility-Administered Medications Ordered in Other Encounters   Medication Frequency    ceFAZolin (ANCEF) 1 g in dextrose 5 % 50 mL IVPB Continuous PRN    electrolyte-S (ISOLYTE) Continuous PRN    midazolam injection PRN    ondansetron injection PRN    phenylephrine injection PRN    rocuronium injection PRN    vasopressin injection PRN       Objective:     Vital Signs (Most Recent):  Temp: 97.8 °F (36.6 °C) (04/04/18 0700)  Pulse: (!) 114 (04/04/18 1000)  Resp: 14 (04/04/18 1000)  BP: (!) 86/54 (04/04/18 1000)  SpO2: 100 % (04/04/18 1000)  O2 Device (Oxygen Therapy):  ventilator (04/04/18 1000) Vital Signs (24h Range):  Temp:  [97.6 °F (36.4 °C)-98 °F (36.7 °C)] 97.8 °F (36.6 °C)  Pulse:  [109-124] 114  Resp:  [12-39] 14  SpO2:  [99 %-100 %] 100 %  BP: ()/(51-74) 86/54     Weight: 63.1 kg (139 lb 1.8 oz) (04/04/18 0300)  Body mass index is 21.79 kg/m².  Body surface area is 1.73 meters squared.    I/O last 3 completed shifts:  In: 6070.3 [I.V.:5820.3; IV Piggyback:250]  Out: 5460 [Other:5460]    Physical Exam   Constitutional: He appears well-developed and well-nourished.   HENT:   Head: Normocephalic and atraumatic.   Eyes: Conjunctivae and EOM are normal.   Cardiovascular: Normal rate and regular rhythm.    Pulmonary/Chest: He has no wheezes. He has no rales.   Abdominal: Soft. He exhibits no distension.   Musculoskeletal: He exhibits no edema or deformity.   Skin: Skin is warm and dry. He is not diaphoretic.       Significant Labs:  ABGs:   Recent Labs  Lab 04/04/18  0330   PH 7.381   PCO2 45.5*   HCO3 27.0   POCSATURATED 61*   BE 2     CBC:   Recent Labs  Lab 04/04/18  0751   WBC 3.40*   RBC 2.14*   HGB 6.6*   HCT 20.2*   *   MCV 94   MCH 30.8   MCHC 32.7     CMP:   Recent Labs  Lab 04/04/18  0326   *  121*   CALCIUM 8.1*  8.1*   ALBUMIN 2.4*  2.4*   PROT 5.5*     138   K 3.8  3.8   CO2 24  24     101   BUN 4*  4*   CREATININE 0.5  0.5   ALKPHOS 172*   ALT 17   AST 27   BILITOT 1.1*     All labs within the past 24 hours have been reviewed.     Significant Imaging:  Labs: Reviewed

## 2018-04-04 NOTE — BRIEF OP NOTE
[facility]  Brief Operative Note    SUMMARY     Surgery Date: 4/4/2018     Surgeon(s) and Role:     * John Chisholm MD - Resident - Assisting     * Varghese Byrnes MD - Primary        Pre-op Diagnosis:  CKD (chronic kidney disease), stage IV [N18.4]    Post-op Diagnosis:  Post-Op Diagnosis Codes:     * CKD (chronic kidney disease), stage IV [N18.4]    Procedure(s) (LRB):  INSERTION-CATHETER-PERM-A-CATH (Left)  INSERTION-TUBE-GASTROSTOMY (N/A)    Anesthesia: Monitor Anesthesia Care    Description of Procedure: perm cath placed in left internal jugular vein, tunneled to left chest, open gastrostomy tube      Estimated Blood Loss: 20 cc         Specimens:   Specimen (12h ago through future)    None

## 2018-04-04 NOTE — ANESTHESIA POSTPROCEDURE EVALUATION
"Anesthesia Post Evaluation    Patient: Lv Crocker    Procedure(s) Performed: Procedure(s) (LRB):  INSERTION-CATHETER-PERM-A-CATH (Left)  INSERTION-TUBE-GASTROSTOMY (N/A)    Final Anesthesia Type: general  Patient location during evaluation: ICU  Patient participation: No - Unable to Participate, Sedation  Level of consciousness: sedated  Post-procedure vital signs: reviewed and stable  Pain management: adequate  Airway patency: patent  PONV status at discharge: No PONV  Anesthetic complications: no      Cardiovascular status: blood pressure returned to baseline  Respiratory status: ventilator (tracheostomy)  Hydration status: euvolemic  Follow-up not needed.        Visit Vitals  BP (!) 83/46   Pulse 101   Temp 36.3 °C (97.4 °F) (Oral)   Resp 20   Ht 5' 7" (1.702 m)   Wt 63.1 kg (139 lb 1.8 oz)   SpO2 100%   BMI 21.79 kg/m²       Pain/Jose Score: Pain Assessment Performed: Yes (4/4/2018  3:00 PM)  Presence of Pain: complains of pain/discomfort (4/4/2018  3:00 PM)  Pain Rating Prior to Med Admin: 8 (4/4/2018 12:23 PM)  Pain Rating Post Med Admin: 5 (4/4/2018 12:53 PM)      "

## 2018-04-04 NOTE — OP NOTE
DATE OF PROCEDURE:  04/04/2018.    PREOPERATIVE DIAGNOSES:  Status post heart transplant, restrictive   cardiomyopathy, ventilator dependence.    POSTOPERATIVE DIAGNOSES:  Status post heart transplant, restrictive   cardiomyopathy, ventilator dependence.    PROCEDURES PERFORMED:  Insertion of left internal jugular Perm-A-Cath and open   gastrostomy tube.    SURGEON:  Varghese Byrnes M.D.    ASSISTANT:  John Chisholm M.D. (RES).    ANESTHESIA:  General.    BLOOD LOSS:  Minimal.    COMPLICATIONS:  None.    INDICATIONS FOR PROCEDURE:  A 42-year-old patient status post heart transplant   with general debility and stage IV chronic kidney disease superimposed with   acute renal failure.    OPERATIVE REPORT IN DETAIL:  The patient was brought to the Operating Room,   placed in the supine position, prepped and draped in sterile fashion once   satisfactory general anesthesia was induced.  Ultrasonography was utilized to   localize the left internal jugular vein, which was then accessed with a needle   and a wire was used to place through the left internal jugular and into the   right heart.  This was confirmed both with the ultrasound at the level of the   neck and fluoroscopy at the level of the chest.  Next, a 23 cm long Perm-A-Cath   was brought out through a stab incision in the left anterior chest to the   puncture site at the left neck.  The tract from the skin to the jugular vein was   dilated with sequentially enlarging dilators until a peel-away dilator was   inserted over the wire under fluoroscopic guidance.  Perm-A-Cath was introduced   via the peel-away and positioned perfectly according to fluoroscopy.  The   catheter was then flushed with saline and then with heparinized solution and   then secured to the skin with nylon.  The patient's abdomen was then prepped and   draped in sterile fashion.  An upper midline incision was made.  The peritoneal   cavity was uneventfully entered.  An 18-Palmer was brought in  through a left   upper quadrant stab incision and then secured to the anterior stomach in   standard Witzel fashion using 2-0 silk suture.  The catheter was flushed and   noted to function properly.  The midline fascia was closed with a running #1   PDS.  The subcu was irrigated and the skin was closed with clips.  Needle,   sponge and instrument counts were correct.  The patient tolerated the procedure   well and was stable at the completion of the operation.      GF/HN  dd: 04/04/2018 11:51:53 (CDT)  td: 04/04/2018 12:38:44 (CDT)  Doc ID   #[id number]  Job ID #[id number]    CC:

## 2018-04-04 NOTE — TRANSFER OF CARE
"Anesthesia Transfer of Care Note    Patient: Lv Crocker    Procedure(s) Performed: Procedure(s) (LRB):  INSERTION-CATHETER-PERM-A-CATH (Left)  INSERTION-TUBE-GASTROSTOMY (N/A)    Patient location: PACU    Anesthesia Type: general    Transport from OR: Transported from OR intubated on 100% O2 by AMBU with adequate controlled ventilation    Post pain: adequate analgesia    Post assessment: no apparent anesthetic complications and tolerated procedure well    Post vital signs: stable    Level of consciousness: sedated    Nausea/Vomiting: no nausea/vomiting    Complications: none          Last vitals:   Visit Vitals  BP (!) 86/54 (BP Location: Left arm, Patient Position: Lying)   Pulse (!) 114   Temp 36.6 °C (97.8 °F) (Oral)   Resp 14   Ht 5' 7" (1.702 m)   Wt 63.1 kg (139 lb 1.8 oz)   SpO2 100%   BMI 21.79 kg/m²     "

## 2018-04-04 NOTE — PROGRESS NOTES
[facility]  Nephrology  Progress Note    Patient Name: Lv Crocker  MRN: [id number]  Admission Date: 3/11/2018  Hospital Length of Stay: 23 days  Attending Provider: Heriberto Rosa MD   Primary Care Physician: Philippe Mohr MD  Principal Problem:Acute respiratory failure with hypoxia    Subjective:     HPI: Mr. Crocker is a 43 yo WM with T1DM, Hashimoto thyroiditis, h/o OHTx 11/2014, and CKD stage 4 who was admitted on 3/11/18 for persistent diarrhea. He reported a 3 week history of diarrhea up to 15x/day. Associated symptoms including URI symptoms, coughing fits, and coughing syncope. Prograf level was 14.3. His post-heart transplant course has been complicated by AMR 4/2015, CMV, post-transplant restrictive cardiomyopathy, recurrent pleural effusions/ascites requiring monthly thoracenteses/paracenteses, VATS 1/11/18 with Pleur-X catheter (now removed), and CKD stage 4 with baseline sCr 2.6-3.0. Baseline -120s and baseline BP 90s-110s/60-70s. Upon admission he was started on IVF and diarrhea workup was initiated. On 3/12 he was noted to have decreased UOP despite fluids; NS was increased to 100cc/hr. He was scheduled for a colonoscopy on 3/13 however procedure was cancelled due to hypoxia and fever. He was transferred to the ICU for closer monitoring. He continued to have oliguria overnight. Bladder scan revealed 200cc of urine but patient refused osullivan catheter placement. Wife reports that patient always has a hard time urinating again after osullivan catheters are placed/removed so he refuses them. He remained hypoxic despite FiO2 70% and ABG revealed combined respiratory and metabolic acidosis with pH 7.17. He was started on BiPAP as well as a bicarb infusion at 50cc/hr. ABG with mild improvement. AM labs revealed K 6.2 and he was shifted and given kayexalate.Trialysis catheter was placed this morning and he subsequently developed hypotension and was started on levophed. He  was intubated later this morning. Nephrology consulted for CACHORRO. All history obtained by primary team and chart review as patient was intubated/sedated on exam. Consent for dialysis obtained by patient's wife and placed in chart. Of note, both of patient's parents were on dialysis.     Interval History:   Received SLED x 12 hours last night. UF ran at 200-250cc/hr. Net +300cc yesterday. No events overnight. Going to OR today for PEG and TDC. Hgb 6.6; planning for blood transfusion today.     Review of patient's allergies indicates:   Allergen Reactions    No known drug allergies      Current Facility-Administered Medications   Medication Frequency    0.9%  NaCl infusion (for blood administration) Q24H PRN    alprazolam ODT dissolvable tablet 0.5 mg TID PRN    chlorhexidine 0.12 % solution 15 mL BID    dextrose 50% injection 12.5 g PRN    dextrose 50% injection 25 g PRN    famotidine (PF) injection 20 mg Daily    fentaNYL 25 mcg/hr 1 patch Q72H    fentaNYL injection 25 mcg Q2H PRN    glucagon (human recombinant) injection 1 mg PRN    glucose chewable tablet 16 g PRN    glucose chewable tablet 24 g PRN    heparin (porcine) injection 5,000 Units Q12H    heparin (porcine) injection PRN    hydrocortisone sodium succinate injection 25 mg BID    insulin aspart U-100 pen 0-4 Units PRN    insulin regular (Humulin R) 100 Units in sodium chloride 0.9% 100 mL infusion Continuous    levothyroxine injection 88 mcg Daily    ondansetron disintegrating tablet 8 mg Q6H PRN    povidone-iodine 10 % ointment PRN    sodium chloride 0.9% flush 3 mL Q8H    tacrolimus capsule 0.5 mg Daily    tacrolimus capsule 1 mg Daily     Facility-Administered Medications Ordered in Other Encounters   Medication Frequency    ceFAZolin (ANCEF) 1 g in dextrose 5 % 50 mL IVPB Continuous PRN    electrolyte-S (ISOLYTE) Continuous PRN    midazolam injection PRN    ondansetron injection PRN    phenylephrine injection PRN     rocuronium injection PRN    vasopressin injection PRN       Objective:     Vital Signs (Most Recent):  Temp: 97.8 °F (36.6 °C) (04/04/18 0700)  Pulse: (!) 114 (04/04/18 1000)  Resp: 14 (04/04/18 1000)  BP: (!) 86/54 (04/04/18 1000)  SpO2: 100 % (04/04/18 1000)  O2 Device (Oxygen Therapy): ventilator (04/04/18 1000) Vital Signs (24h Range):  Temp:  [97.6 °F (36.4 °C)-98 °F (36.7 °C)] 97.8 °F (36.6 °C)  Pulse:  [109-124] 114  Resp:  [12-39] 14  SpO2:  [99 %-100 %] 100 %  BP: ()/(51-74) 86/54     Weight: 63.1 kg (139 lb 1.8 oz) (04/04/18 0300)  Body mass index is 21.79 kg/m².  Body surface area is 1.73 meters squared.    I/O last 3 completed shifts:  In: 6070.3 [I.V.:5820.3; IV Piggyback:250]  Out: 5460 [Other:5460]    Physical Exam   Constitutional: He appears well-developed and well-nourished.   HENT:   Head: Normocephalic and atraumatic.   Eyes: Conjunctivae and EOM are normal.   Cardiovascular: Normal rate and regular rhythm.    Pulmonary/Chest: He has no wheezes. He has no rales.   Abdominal: Soft. He exhibits no distension.   Musculoskeletal: He exhibits no edema or deformity.   Skin: Skin is warm and dry. He is not diaphoretic.       Significant Labs:  ABGs:   Recent Labs  Lab 04/04/18  0330   PH 7.381   PCO2 45.5*   HCO3 27.0   POCSATURATED 61*   BE 2     CBC:   Recent Labs  Lab 04/04/18  0751   WBC 3.40*   RBC 2.14*   HGB 6.6*   HCT 20.2*   *   MCV 94   MCH 30.8   MCHC 32.7     CMP:   Recent Labs  Lab 04/04/18  0326   *  121*   CALCIUM 8.1*  8.1*   ALBUMIN 2.4*  2.4*   PROT 5.5*     138   K 3.8  3.8   CO2 24  24     101   BUN 4*  4*   CREATININE 0.5  0.5   ALKPHOS 172*   ALT 17   AST 27   BILITOT 1.1*     All labs within the past 24 hours have been reviewed.     Significant Imaging:  Labs: Reviewed    Assessment/Plan:     Acute renal failure superimposed on stage 4 chronic kidney disease    - baseline sCr 2.6-3.0 consistent with CKD stage IV  - anuric CACHORRO; likely  ischemic ATN from prolonged pre-renal state (diarrhea) in setting of prograf renal vasoconstriction; cannot r/o septic ATN or Prograf toxicity  - SLED started 3/14  - remains anuric. Currently working on spacing out RRT treatments  - SLED x 12 hours last night with good metabolic clearance obtained. Net +300cc; facial edema this morning.  - would like to hold RRT tonight but patient going to OR today and receiving blood products.   - will check labs at 2pm; if any electrolyte abnormalities, will arrange for nocturnal SLED. Otherwise will hold until tomorrow.             Amairani Alegria, PGY-5  Nephrology Fellow  [facility]  Pager: [phone]        I have reviewed and concur with the NP's history, physical, assessment, and plan. I have personally interviewed and examined the patient at bedside

## 2018-04-04 NOTE — PROGRESS NOTES
"[facility]  Endocrinology  Progress Note    Admit Date: 3/11/2018     Reason for Consult: abnormal TFTs    Surgical Procedure and Date: s/p heart transplant 2014     Diabetes diagnosis year: 7yo (T1DM)     Home Diabetes Medications:    Levemir 15u qHS  Novolog 4u AC     How often checking glucose at home? 4 times daily   BG readings on regimen: 150-200s  Hypoglycemia on the regimen?  Yes, rarely - treats appropriately (light snack, glucose tab)  Missed doses on regimen?  No        Diabetes Complications include:     Hyperglycemia, Hypoglycemia  and Diabetic chronic kidney disease          Complicating diabetes co morbidities:   N/A     HPI:   Patient is a 42 y.o. male with a diagnosis of T1DM, HTN, hypothyroidism, s/p heart transplant.  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 He required serial paracentesis and thoracentesis until he underwent a VATS with pleurX catheter placement on 1/11/2018 and removed 2/7/18.       Presented to hospital secondary to diarrhea and cough.  Upon initial labs, TSH was decreased (0.101) and free T4 1.33.  Endocrinology consulted to assist in management of these labs.  He was last seen in clinic by Kamala Vázquez NP 11/2017.   Previous hospitalization, endocrine consulted for same problem.  During that visit, recommended decreasing LT4 to 125mcg daily. Unfortunately, patient was discharged on previous dose of 150mcg daily.     Interval HPI:   Overnight events:  To OR today for permacath and PEG  BG at goal and insulin gtt stepped down from 0.3 to 0.2u/hr    /61   Pulse 110   Temp 97.8 °F (36.6 °C) (Oral)   Resp 12   Ht 5' 7" (1.702 m)   Wt 63.1 kg (139 lb 1.8 oz)   SpO2 100%   BMI 21.79 kg/m²       Labs Reviewed and Include      Recent Labs  Lab 04/04/18  0326   *  121*   CALCIUM 8.1*  8.1*   ALBUMIN 2.4*  2.4*   PROT 5.5*     " 138   K 3.8  3.8   CO2 24  24     101   BUN 4*  4*   CREATININE 0.5  0.5   ALKPHOS 172*   ALT 17   AST 27   BILITOT 1.1*     Lab Results   Component Value Date    WBC 3.40 (L) 04/04/2018    HGB 6.6 (L) 04/04/2018    HCT 20.2 (L) 04/04/2018    MCV 94 04/04/2018     (L) 04/04/2018       Recent Labs  Lab 03/30/18  0217   TSH 9.412*   FREET4 0.81     Lab Results   Component Value Date    HGBA1C 6.9 (H) 02/11/2018       Nutritional status:   Body mass index is 21.79 kg/m².  Lab Results   Component Value Date    ALBUMIN 2.4 (L) 04/04/2018    ALBUMIN 2.4 (L) 04/04/2018    ALBUMIN 2.4 (L) 04/03/2018     Lab Results   Component Value Date    PREALBUMIN 5 (L) 03/15/2018    PREALBUMIN 18 (L) 03/24/2015    PREALBUMIN 19 (L) 12/17/2014       Estimated Creatinine Clearance: 168.3 mL/min (based on SCr of 0.5 mg/dL).    Accu-Checks  Recent Labs      04/03/18   0059  04/03/18   0328  04/03/18   0910  04/03/18   1227  04/03/18   1715  04/03/18   1959  04/03/18   2208  04/04/18   0001  04/04/18   0328  04/04/18   0750   POCTGLUCOSE  118*  112*  115*  167*  248*  195*  149*  151*  166*  113*       Current Medications and/or Treatments Impacting Glycemic Control  Immunotherapy:  Immunosuppressants         Stop Route Frequency     tacrolimus capsule 0.5 mg      -- SL Daily     tacrolimus capsule 1 mg      -- SL Daily        Steroids:   Hormones     Start     Stop Route Frequency Ordered    03/31/18 0900  hydrocortisone sodium succinate injection 25 mg      -- IV 2 times daily 03/30/18 1711        Pressors:    Autonomic Drugs     None        Hyperglycemia/Diabetes Medications: Antihyperglycemics     Start     Stop Route Frequency Ordered    04/02/18 1019  insulin aspart U-100 pen 0-4 Units      -- SubQ As needed (PRN) 04/02/18 0919    04/02/18 0916  insulin regular (Humulin R) 100 Units in sodium chloride 0.9% 100 mL infusion      -- IV Continuous 04/02/18 0995          ASSESSMENT and PLAN    Type 1 diabetes mellitus  with stage 3 chronic kidney disease    BG goal 140-1180    Continue transition at 0.3 units/hr with step down parameters to 0.2 and 0.1u/hr. POC q4 Low dose correction  Discussed with nurse, pt is T1DM, do not suspend insulin >1 hr  Use intensive protocol while in OR      If feedings (TPN or TF) to be delayed, recommend starting dextrose gtt which will allow for uptitration in insulin gtt.     Discharge Recommendations:  TBD.          Current chronic use of systemic steroids     Agree with HC              Hypothyroidism due to Hashimoto's thyroiditis     Abnormal TFTs upon admit.  Unclear if secondary to illness, but with evidence of iatrogenic hyperthyroidism in past while on above weight based dose.      TSH 9 Possibly from missed doses from lack of PO access vs. Bowel edema with malabsorption, vs NTIS.  Reassuring that FT4 normal.      Continue IV LT4  88 mcg daily (which is 70% PO dose 125mcg) and would follow clinical s/s rather than TFTs solely in setting of contributing NTIS.     Recheck TFTs in 4 weeks.             Acute renal failure superimposed on stage 4 chronic kidney disease     Avoid insulin stacking             Heart transplanted     Per transplant  Avoid hypoglycemia       Palmira Dorsey MD  Endocrinology  [facility]

## 2018-04-04 NOTE — CONSULTS
Single lumen 20G x 8CM midline placed right median cubital vein. Max dwell date 5/3/18, Lot#[id number] .  Needle advanced into the vessel under real time ultrasound guidance.  Image recorded and saved.

## 2018-04-04 NOTE — SUBJECTIVE & OBJECTIVE
"Interval HPI:   Overnight events:  To OR today for permacath and PEG  BG at goal and insulin gtt stepped down from 0.3 to 0.2u/hr    /61   Pulse 110   Temp 97.8 °F (36.6 °C) (Oral)   Resp 12   Ht 5' 7" (1.702 m)   Wt 63.1 kg (139 lb 1.8 oz)   SpO2 100%   BMI 21.79 kg/m²     Labs Reviewed and Include      Recent Labs  Lab 04/04/18  0326   *  121*   CALCIUM 8.1*  8.1*   ALBUMIN 2.4*  2.4*   PROT 5.5*     138   K 3.8  3.8   CO2 24  24     101   BUN 4*  4*   CREATININE 0.5  0.5   ALKPHOS 172*   ALT 17   AST 27   BILITOT 1.1*     Lab Results   Component Value Date    WBC 3.40 (L) 04/04/2018    HGB 6.6 (L) 04/04/2018    HCT 20.2 (L) 04/04/2018    MCV 94 04/04/2018     (L) 04/04/2018       Recent Labs  Lab 03/30/18  0217   TSH 9.412*   FREET4 0.81     Lab Results   Component Value Date    HGBA1C 6.9 (H) 02/11/2018       Nutritional status:   Body mass index is 21.79 kg/m².  Lab Results   Component Value Date    ALBUMIN 2.4 (L) 04/04/2018    ALBUMIN 2.4 (L) 04/04/2018    ALBUMIN 2.4 (L) 04/03/2018     Lab Results   Component Value Date    PREALBUMIN 5 (L) 03/15/2018    PREALBUMIN 18 (L) 03/24/2015    PREALBUMIN 19 (L) 12/17/2014       Estimated Creatinine Clearance: 168.3 mL/min (based on SCr of 0.5 mg/dL).    Accu-Checks  Recent Labs      04/03/18   0059  04/03/18   0328  04/03/18   0910  04/03/18   1227  04/03/18   1715  04/03/18   1959  04/03/18   2208  04/04/18   0001  04/04/18   0328  04/04/18   0750   POCTGLUCOSE  118*  112*  115*  167*  248*  195*  149*  151*  166*  113*       Current Medications and/or Treatments Impacting Glycemic Control  Immunotherapy:  Immunosuppressants         Stop Route Frequency     tacrolimus capsule 0.5 mg      -- SL Daily     tacrolimus capsule 1 mg      -- SL Daily        Steroids:   Hormones     Start     Stop Route Frequency Ordered    03/31/18 0900  hydrocortisone sodium succinate injection 25 mg      -- IV 2 times daily 03/30/18 1711    "     Pressors:    Autonomic Drugs     None        Hyperglycemia/Diabetes Medications: Antihyperglycemics     Start     Stop Route Frequency Ordered    04/02/18 1019  insulin aspart U-100 pen 0-4 Units      -- SubQ As needed (PRN) 04/02/18 0919 04/02/18 0916  insulin regular (Humulin R) 100 Units in sodium chloride 0.9% 100 mL infusion      -- IV Continuous 04/02/18 0919

## 2018-04-04 NOTE — ASSESSMENT & PLAN NOTE
- baseline sCr 2.6-3.0 consistent with CKD stage IV  - anuric CACHORRO; likely ischemic ATN from prolonged pre-renal state (diarrhea) in setting of prograf renal vasoconstriction; cannot r/o septic ATN or Prograf toxicity  - SLED started 3/14  - remains anuric. Currently working on spacing out RRT treatments  - SLED x 12 hours last night with good metabolic clearance obtained. Net +300cc; facial edema this morning.  - would like to hold RRT tonight but patient going to OR today and receiving blood products.   - will check labs at 2pm; if any electrolyte abnormalities, will arrange for nocturnal SLED. Otherwise will hold until tomorrow.

## 2018-04-04 NOTE — PLAN OF CARE
Problem: Patient Care Overview  Goal: Plan of Care Review  Outcome: Ongoing (interventions implemented as appropriate)  Eventful day, G-tube & permacath placed in OR. Patient has been sleepy most of day recovering. See vital signs and assessments in flowsheets. See below for updates on today's progress.     Pulmonary: SIMV on ventilator, tolerating well. Hopefully will go on trach collar tomorrow when more awake.     Cardiovascular: SR-ST on tele today.     Neurological: RASS -1     Gastrointestinal: New g-tube in place to gravity. BM x2.     Genitourinary: Anuric, nocturnal CRRT     Endocrine: Insulin gtt at 0.1 units/hr, sliding scale PRN.     Integumentary/Other: Skin intact, barrier cream applied. Full ChG bath given. Heel boot to right foot. Foot drop boot to left foot. SCDs in place. Betadine applied to right necrotic toes, left great toe and right thumb.     Infusions: insulin gtt     Patient progressing towards goals as tolerated, plan of care communicated and reviewed with Lv Crocker and his wife, Jessica. All concerns addressed. Will continue to monitor.

## 2018-04-04 NOTE — PROGRESS NOTES
Patient arrived back from OR and placed on tele. Patient c/o abdominal tenderness at surgical site, PRN fentanyl given. G tube to dependent drainage. New permacath CDI to left chest wall. Vitals stable. Wife aware of patient's return.

## 2018-04-04 NOTE — NURSING
Rounds Report: Attended interdisciplinary rounds this morning with the transplant team including SW, physicians, fellows,  mid-level providers, and transplant coordinators.  Discussed plan of care, PEG/Perma cath placement today in OR. LTAC possibly next week.

## 2018-04-04 NOTE — PROGRESS NOTES
Received unit of blood to unit for patient, originally for OR. Notified JOSE DANIEL Haro of Hbg 6.6 at 8am. Ok to transfuse 1 unit RBC and recheck CBC in 4 hours.

## 2018-04-04 NOTE — PLAN OF CARE
Problem: Patient Care Overview  Goal: Plan of Care Review  Outcome: Ongoing (interventions implemented as appropriate)  NAEON. See flowsheets for vital signs and assessments. See below for updates on progress made.       Neuro: slept well, seems to be in better spirits this shift    Cardiovascular: -120; SBP 90s-100s, MAPs 60-80; CVPs 14, 6, 4; SVO2 61      Pulmonary: SIMV overnight     Gastrointestinal: BM x1    Genitourinary: anuric; CRRT for 12 hours    Endocrine: CRRT at ; mag replaced    Integumentary/other: betadine painted on R toes     Infusions: insulin at 0.2    POC: to OR for perm-a-cath and PEG    Patient progressing towards goals as tolerated, plan of care communicated and reviewed with Lv Crocker and wife, Elizabeth, at bedside. All concerns addressed. Will continue to monitor.

## 2018-04-04 NOTE — PROGRESS NOTES
Anesthesia and OR team at bedside to transfer patient. Pre op checklist completed. Consents on chart. T/S sent. Wife and family at bedside aware.

## 2018-04-04 NOTE — SUBJECTIVE & OBJECTIVE
Interval History: pain better with addition of fentanyl, to OR today for PEG    Continuous Infusions:   insulin (HUMAN R) infusion (adults) 0.1 Units/hr (04/04/18 1400)     Scheduled Meds:   chlorhexidine  15 mL Mouth/Throat BID    famotidine (PF)  20 mg Intravenous Daily    fentaNYL  1 patch Transdermal Q72H    heparin (porcine)  5,000 Units Subcutaneous Q12H    hydrocortisone sodium succinate  25 mg Intravenous BID    levothyroxine  88 mcg Intravenous Daily    sodium chloride 0.9%  3 mL Intravenous Q8H    tacrolimus  0.5 mg Sublingual Daily    tacrolimus  1 mg Sublingual Daily     PRN Meds:sodium chloride, alprazolam ODT, dextrose 50%, dextrose 50%, fentaNYL, glucagon (human recombinant), glucose, glucose, insulin aspart U-100, ondansetron, povidone-iodine    Review of patient's allergies indicates:   Allergen Reactions    No known drug allergies      Objective:     Vital Signs (Most Recent):  Temp: 97.5 °F (36.4 °C) (04/04/18 1300)  Pulse: 97 (04/04/18 1400)  Resp: 15 (04/04/18 1400)  BP: (!) 76/43 (04/04/18 1400)  SpO2: 100 % (04/04/18 1400) Vital Signs (24h Range):  Temp:  [97.5 °F (36.4 °C)-98 °F (36.7 °C)] 97.5 °F (36.4 °C)  Pulse:  [] 97  Resp:  [12-39] 15  SpO2:  [99 %-100 %] 100 %  BP: ()/(43-74) 76/43     Patient Vitals for the past 72 hrs (Last 3 readings):   Weight   04/04/18 0300 63.1 kg (139 lb 1.8 oz)   04/03/18 0600 62.2 kg (137 lb 2 oz)   04/02/18 1100 65.9 kg (145 lb 4.5 oz)     Body mass index is 21.79 kg/m².      Intake/Output Summary (Last 24 hours) at 04/04/18 1444  Last data filed at 04/04/18 1400   Gross per 24 hour   Intake          3821.03 ml   Output             2829 ml   Net           992.03 ml       Hemodynamic Parameters:         Physical Exam   Constitutional:   Chronically ill    HENT:   Head: Normocephalic and atraumatic.   Eyes: Conjunctivae are normal. Pupils are equal, round, and reactive to light.   Neck: No JVD present. No thyromegaly present.   RIJ  trialysis   Tracheostomy (C/D/I)   Cardiovascular: Regular rhythm.    Tachycardic   Pulmonary/Chest:   Coarse breath sounds throughout   Mostly clear    Abdominal: Soft. Bowel sounds are normal.   Musculoskeletal:   Trace - 1+ gen edema   Neurological:   AAO x 3.   Skin: Skin is warm and dry. Capillary refill takes less than 2 seconds. There is pallor.       Significant Labs:  CBC:    Recent Labs  Lab 04/03/18  0859 04/03/18  1959 04/04/18  0751   WBC 3.27* 4.80 3.40*   RBC 2.30* 2.32* 2.14*   HGB 7.2* 7.2* 6.6*   HCT 22.2* 22.3* 20.2*   * 127* 109*   MCV 97 96 94   MCH 31.3* 31.0 30.8   MCHC 32.4 32.3 32.7     BNP:  No results for input(s): BNP in the last 168 hours.    Invalid input(s): BNPTRIAGELBLO  CMP:    Recent Labs  Lab 04/02/18  0314  04/03/18  0324 04/03/18  1428 04/03/18  2209 04/04/18  0326   *  138*  < > 111*  111* 189* 141* 121*  121*   CALCIUM 8.3*  8.3*  < > 7.9*  7.9* 8.1* 8.1* 8.1*  8.1*   ALBUMIN 2.6*  2.6*  < > 2.4*  2.4* 2.4* 2.4* 2.4*  2.4*   PROT 6.3  --  5.5*  --   --  5.5*     137  < > 139  139 138 138 138  138   K 4.9  4.9  < > 4.3  4.3 4.6 4.2 3.8  3.8   CO2 21*  21*  < > 22*  22* 16* 24 24  24     105  < > 107  107 106 102 101  101   BUN 5*  5*  < > 4*  4* 11 8 4*  4*   CREATININE 0.7  0.7  < > 0.4*  0.4* 0.9 0.6 0.5  0.5   ALKPHOS 189*  --  166*  --   --  172*   ALT 15  --  14  --   --  17   AST 24  --  23  --   --  27   BILITOT 1.1*  --  1.2*  --   --  1.1*   < > = values in this interval not displayed.   Coagulation:   No results for input(s): PT, INR, APTT in the last 168 hours.  LDH:  No results for input(s): LDH in the last 72 hours.  Microbiology:  Microbiology Results (last 7 days)     Procedure Component Value Units Date/Time    Culture, Anaerobe [[id number]] Collected:  03/28/18 1607    Order Status:  Completed Specimen:  Body Fluid from Lung, RLL Updated:  04/02/18 1251     Anaerobic Culture No anaerobes isolated    Aerobic  culture [[id number]] Collected:  03/28/18 1607    Order Status:  Completed Specimen:  Body Fluid from Lung, RLL Updated:  03/31/18 1209     Aerobic Bacterial Culture No growth    Blood culture [[id number]] Collected:  03/25/18 1554    Order Status:  Completed Specimen:  Blood from Peripheral, Upper Arm, Right Updated:  03/30/18 1812     Blood Culture, Routine No growth after 5 days.    Blood culture [[id number]] Collected:  03/25/18 1606    Order Status:  Completed Specimen:  Blood from Peripheral, Forearm, Left Updated:  03/30/18 1812     Blood Culture, Routine No growth after 5 days.    AFB Culture & Smear [[id number]] Collected:  03/28/18 1607    Order Status:  Completed Specimen:  Body Fluid from Lung, RLL Updated:  03/29/18 2127     AFB Culture & Smear Culture in progress     AFB CULTURE STAIN No acid fast bacilli seen.    Gram stain [[id number]] Collected:  03/28/18 1607    Order Status:  Completed Specimen:  Body Fluid from Lung, RLL Updated:  03/28/18 1654     Gram Stain Result Rare WBC's      No organisms seen    Fungus culture [[id number]] Collected:  03/28/18 1607    Order Status:  Sent Specimen:  Body Fluid from Lung, RLL Updated:  03/28/18 1617          I have reviewed all pertinent labs within the past 24 hours.    Estimated Creatinine Clearance: 168.3 mL/min (based on SCr of 0.5 mg/dL).    Diagnostic Results:  I have reviewed and interpreted all pertinent imaging results/findings within the past 24 hours.

## 2018-04-04 NOTE — ASSESSMENT & PLAN NOTE
BG goal 140-1180    Continue transition at 0.3 units/hr with step down parameters to 0.2 and 0.1u/hr. POC q4 Low dose correction  Discussed with nurse, pt is T1DM, do not suspend insulin >1 hr  Use intensive protocol while in OR      If feedings (TPN or TF) to be delayed, recommend starting dextrose gtt which will allow for uptitration in insulin gtt.     Discharge Recommendations:  TBD.

## 2018-04-05 PROBLEM — Z78.9 ON ENTERAL NUTRITION: Status: ACTIVE | Noted: 2018-01-01

## 2018-04-05 NOTE — NURSING
Rounds Report: Attended interdisciplinary rounds this morning with the transplant team including SW, physicians, fellows,  mid-level providers, and transplant coordinators.  Discussed plan of care pt to be discharged to [facility] in 2 weeks.     Quality 110: Preventive Care And Screening: Influenza Immunization: Influenza Immunization Administered during Influenza season Quality 431: Preventive Care And Screening: Unhealthy Alcohol Use - Screening: Patient screened for unhealthy alcohol use using a single question and scores less than 2 times per year Detail Level: Detailed

## 2018-04-05 NOTE — SUBJECTIVE & OBJECTIVE
Interval History: s/p PEG and perma cath in OR, less pain this morning, blood counts stable after transfusion yesterday     Continuous Infusions:   sodium chloride 0.9%      insulin (HUMAN R) infusion (adults) 0.2 Units/hr (04/05/18 1300)     Scheduled Meds:   chlorhexidine  15 mL Mouth/Throat BID    [START ON 4/6/2018] escitalopram oxalate  20 mg Per G Tube Daily    famotidine  20 mg Per G Tube QHS    heparin (porcine)  5,000 Units Subcutaneous Q12H    hydrocortisone sodium succinate  25 mg Intravenous BID    [START ON 4/6/2018] levothyroxine  137 mcg Per G Tube Daily    oxyCODONE  10 mg Per G Tube TID    [START ON 4/6/2018] predniSONE  7.5 mg Per G Tube Daily    sodium chloride 0.9%  3 mL Intravenous Q8H    tacrolimus  2 mg Per G Tube Daily    [START ON 4/6/2018] tacrolimus  1 mg Per G Tube Daily     PRN Meds:sodium chloride, alprazolam ODT, dextrose 50%, dextrose 50%, glucagon (human recombinant), glucose, glucose, insulin aspart U-100, ondansetron, oxyCODONE, povidone-iodine    Review of patient's allergies indicates:   Allergen Reactions    No known drug allergies      Objective:     Vital Signs (Most Recent):  Temp: 98 °F (36.7 °C) (04/05/18 1100)  Pulse: (!) 120 (04/05/18 1300)  Resp: 15 (04/05/18 1300)  BP: (!) 85/48 (04/05/18 1300)  SpO2: 100 % (04/05/18 1300) Vital Signs (24h Range):  Temp:  [97.1 °F (36.2 °C)-98 °F (36.7 °C)] 98 °F (36.7 °C)  Pulse:  [] 120  Resp:  [5-24] 15  SpO2:  [95 %-100 %] 100 %  BP: ()/(46-70) 85/48     Patient Vitals for the past 72 hrs (Last 3 readings):   Weight   04/05/18 1300 63.1 kg (139 lb 1.8 oz)   04/04/18 0300 63.1 kg (139 lb 1.8 oz)   04/03/18 0600 62.2 kg (137 lb 2 oz)     Body mass index is 21.79 kg/m².      Intake/Output Summary (Last 24 hours) at 04/05/18 1420  Last data filed at 04/05/18 1300   Gross per 24 hour   Intake           246.58 ml   Output               60 ml   Net           186.58 ml       Hemodynamic Parameters:         Physical  Exam   Constitutional: He appears well-developed and well-nourished. No distress.   HENT:   Head: Normocephalic and atraumatic.   Eyes: EOM are normal. No scleral icterus.   Neck: Normal range of motion.   Trach in place    Cardiovascular: Regular rhythm.    tachycardic   Pulmonary/Chest: Effort normal. No respiratory distress.   Perm cath in place no signs of infection   Abdominal:   Soft, NT, previous chest tube scars noted  No distension, no fluid wave  g tube in place with green drainage, wound C/D/I   Musculoskeletal: He exhibits no edema or tenderness.   Neurological: He is alert.   Skin: Skin is warm and dry.       Significant Labs:  CBC:    Recent Labs  Lab 04/04/18  1530 04/04/18  1951 04/05/18  0818   WBC 5.05 4.98 4.52   RBC 2.70* 2.70* 2.63*   HGB 8.5* 8.2* 8.1*   HCT 26.0* 26.2* 25.6*   * 119* 111*   MCV 96 97 97   MCH 31.5* 30.4 30.8   MCHC 32.7 31.3* 31.6*     BNP:  No results for input(s): BNP in the last 168 hours.    Invalid input(s): BNPTRIAGELBLO  CMP:    Recent Labs  Lab 04/03/18  0324  04/04/18  0326 04/04/18  1530 04/05/18  0314   *  111*  < > 121*  121* 205* 213*   CALCIUM 7.9*  7.9*  < > 8.1*  8.1* 8.3* 8.4*   ALBUMIN 2.4*  2.4*  < > 2.4*  2.4* 2.4* 2.3*   PROT 5.5*  --  5.5*  --  5.5*     139  < > 138  138 137 139   K 4.3  4.3  < > 3.8  3.8 3.9 4.2   CO2 22*  22*  < > 24  24 15* 17*     107  < > 101  101 101 101   BUN 4*  4*  < > 4*  4* 13 27*   CREATININE 0.4*  0.4*  < > 0.5  0.5 1.0 1.7*   ALKPHOS 166*  --  172*  --  162*   ALT 14  --  17  --  14   AST 23  --  27  --  26   BILITOT 1.2*  --  1.1*  --  0.8   < > = values in this interval not displayed.   Coagulation:   No results for input(s): PT, INR, APTT in the last 168 hours.  LDH:  No results for input(s): LDH in the last 72 hours.  Microbiology:  Microbiology Results (last 7 days)     Procedure Component Value Units Date/Time    Fungus culture [[id number]] Collected:  03/28/18 1607    Order  Status:  Completed Specimen:  Body Fluid from Lung, RLL Updated:  04/05/18 1118     Fungus (Mycology) Culture Culture in progress    Culture, Anaerobe [[id number]] Collected:  03/28/18 1607    Order Status:  Completed Specimen:  Body Fluid from Lung, RLL Updated:  04/02/18 1251     Anaerobic Culture No anaerobes isolated    Aerobic culture [[id number]] Collected:  03/28/18 1607    Order Status:  Completed Specimen:  Body Fluid from Lung, RLL Updated:  03/31/18 1209     Aerobic Bacterial Culture No growth    Blood culture [[id number]] Collected:  03/25/18 1554    Order Status:  Completed Specimen:  Blood from Peripheral, Upper Arm, Right Updated:  03/30/18 1812     Blood Culture, Routine No growth after 5 days.    Blood culture [[id number]] Collected:  03/25/18 1606    Order Status:  Completed Specimen:  Blood from Peripheral, Forearm, Left Updated:  03/30/18 1812     Blood Culture, Routine No growth after 5 days.    AFB Culture & Smear [[id number]] Collected:  03/28/18 1607    Order Status:  Completed Specimen:  Body Fluid from Lung, RLL Updated:  03/29/18 2127     AFB Culture & Smear Culture in progress     AFB CULTURE STAIN No acid fast bacilli seen.          I have reviewed all pertinent labs within the past 24 hours.    Estimated Creatinine Clearance: 49.5 mL/min (A) (based on SCr of 1.7 mg/dL (H)).    Diagnostic Results:  I have reviewed and interpreted all pertinent imaging results/findings within the past 24 hours.

## 2018-04-05 NOTE — ASSESSMENT & PLAN NOTE
BG goal 140-1180    Rewrite transition at 0.2 units/hr, POC q4 Low dose correction  Anticipate increasing insulin rate when TF begun.   Noting that basal needs at goal on 0.2u/hr (~5 units)     Discussed with nurse, pt is T1DM, do not suspend insulin >1 hr       If feedings (TPN or TF) to be delayed, recommend starting dextrose gtt which will allow for uptitration in insulin gtt.     Discharge Recommendations:  TBD.

## 2018-04-05 NOTE — ASSESSMENT & PLAN NOTE
- baseline sCr 2.6-3.0 consistent with CKD stage IV  - anuric CACHORRO; likely ischemic ATN from prolonged pre-renal state (diarrhea) in setting of prograf renal vasoconstriction; cannot r/o septic ATN or Prograf toxicity  - SLED started 3/14  - remains anuric. Currently working on spacing out RRT treatments  - last received SLED x 12 hours on 4/3  - will plan for SLED x 12 hours again tonight. Patient requires 3K/35HCO3. UF goal 250-300cc/hr to match intake from last 24 hours  - tentatively planning to hold tomorrow. Will reassess on Saturday to determine need for SLED vs HD; ideally will have him tolerating HD by Monday or Tuesday (if BP allows)  - do not use tunneled HD catheter for anything other than dialysis.

## 2018-04-05 NOTE — PLAN OF CARE
Problem: Patient Care Overview  Goal: Plan of Care Review  Outcome: Ongoing (interventions implemented as appropriate)  No acute events throughout day. See vital signs and assessments in flowsheets. See below for updates on today's progress.     Pulmonary: Patient remains on mechanical ventilation, settings as follows: SIMV 25%/14/420/5+. #8 shiley trach, cleansed per RT.    Cardiovascular: ST throughout shift, 110-120s. Keep SBP>80 per PA Sindy.    Neurological: AAOx4, patient communicates throughout non verbals. Follows commands and Nathan spontaneously.    Gastrointestinal: No BM, TF to be restarted. Abdominal incision c/d/i. PEG sutured in place, dressing c/d/i with G tube draining to gravity.     Genitourinary: Anuric, CRRT to be started tonight.    Endocrine: Accu checks q 4, insulin infusion increased per Endocrine with coverage from S/S.    Integumentary/Other: See flowsheets for charting. Left brachial midline placed this shift.    Infusions: Insulin maintained    Patient progressing towards goals as tolerated, plan of care communicated and reviewed with Lv Crocker and family. All concerns addressed. Will continue to monitor.

## 2018-04-05 NOTE — PROGRESS NOTES
[facility]  Heart Transplant  Progress Note    Patient Name: Lv Crocker  MRN: [id number]  Admission Date: 3/11/2018  Hospital Length of Stay: 24 days  Attending Physician: Heriberto Rosa MD  Primary Care Provider: Philippe Mohr MD  Principal Problem:Acute respiratory failure with hypoxia    Subjective:     Interval History: s/p PEG and perma cath in OR, less pain this morning, blood counts stable after transfusion yesterday     Continuous Infusions:   sodium chloride 0.9%      insulin (HUMAN R) infusion (adults) 0.2 Units/hr (04/05/18 1300)     Scheduled Meds:   chlorhexidine  15 mL Mouth/Throat BID    [START ON 4/6/2018] escitalopram oxalate  20 mg Per G Tube Daily    famotidine  20 mg Per G Tube QHS    heparin (porcine)  5,000 Units Subcutaneous Q12H    hydrocortisone sodium succinate  25 mg Intravenous BID    [START ON 4/6/2018] levothyroxine  137 mcg Per G Tube Daily    oxyCODONE  10 mg Per G Tube TID    [START ON 4/6/2018] predniSONE  7.5 mg Per G Tube Daily    sodium chloride 0.9%  3 mL Intravenous Q8H    tacrolimus  2 mg Per G Tube Daily    [START ON 4/6/2018] tacrolimus  1 mg Per G Tube Daily     PRN Meds:sodium chloride, alprazolam ODT, dextrose 50%, dextrose 50%, glucagon (human recombinant), glucose, glucose, insulin aspart U-100, ondansetron, oxyCODONE, povidone-iodine    Review of patient's allergies indicates:   Allergen Reactions    No known drug allergies      Objective:     Vital Signs (Most Recent):  Temp: 98 °F (36.7 °C) (04/05/18 1100)  Pulse: (!) 120 (04/05/18 1300)  Resp: 15 (04/05/18 1300)  BP: (!) 85/48 (04/05/18 1300)  SpO2: 100 % (04/05/18 1300) Vital Signs (24h Range):  Temp:  [97.1 °F (36.2 °C)-98 °F (36.7 °C)] 98 °F (36.7 °C)  Pulse:  [] 120  Resp:  [5-24] 15  SpO2:  [95 %-100 %] 100 %  BP: ()/(46-70) 85/48     Patient Vitals for the past 72 hrs (Last 3 readings):   Weight   04/05/18 1300 63.1 kg (139 lb 1.8 oz)   04/04/18 0300  63.1 kg (139 lb 1.8 oz)   04/03/18 0600 62.2 kg (137 lb 2 oz)     Body mass index is 21.79 kg/m².      Intake/Output Summary (Last 24 hours) at 04/05/18 1420  Last data filed at 04/05/18 1300   Gross per 24 hour   Intake           246.58 ml   Output               60 ml   Net           186.58 ml       Hemodynamic Parameters:         Physical Exam   Constitutional: He appears well-developed and well-nourished. No distress.   HENT:   Head: Normocephalic and atraumatic.   Eyes: EOM are normal. No scleral icterus.   Neck: Normal range of motion.   Trach in place    Cardiovascular: Regular rhythm.    tachycardic   Pulmonary/Chest: Effort normal. No respiratory distress.   Perm cath in place no signs of infection   Abdominal:   Soft, NT, previous chest tube scars noted  No distension, no fluid wave  g tube in place with green drainage, wound C/D/I   Musculoskeletal: He exhibits no edema or tenderness.   Neurological: He is alert.   Skin: Skin is warm and dry.       Significant Labs:  CBC:    Recent Labs  Lab 04/04/18  1530 04/04/18  1951 04/05/18  0818   WBC 5.05 4.98 4.52   RBC 2.70* 2.70* 2.63*   HGB 8.5* 8.2* 8.1*   HCT 26.0* 26.2* 25.6*   * 119* 111*   MCV 96 97 97   MCH 31.5* 30.4 30.8   MCHC 32.7 31.3* 31.6*     BNP:  No results for input(s): BNP in the last 168 hours.    Invalid input(s): BNPTRIAGELBLO  CMP:    Recent Labs  Lab 04/03/18  0324  04/04/18  0326 04/04/18  1530 04/05/18  0314   *  111*  < > 121*  121* 205* 213*   CALCIUM 7.9*  7.9*  < > 8.1*  8.1* 8.3* 8.4*   ALBUMIN 2.4*  2.4*  < > 2.4*  2.4* 2.4* 2.3*   PROT 5.5*  --  5.5*  --  5.5*     139  < > 138  138 137 139   K 4.3  4.3  < > 3.8  3.8 3.9 4.2   CO2 22*  22*  < > 24  24 15* 17*     107  < > 101  101 101 101   BUN 4*  4*  < > 4*  4* 13 27*   CREATININE 0.4*  0.4*  < > 0.5  0.5 1.0 1.7*   ALKPHOS 166*  --  172*  --  162*   ALT 14  --  17  --  14   AST 23  --  27  --  26   BILITOT 1.2*  --  1.1*  --  0.8   <  > = values in this interval not displayed.   Coagulation:   No results for input(s): PT, INR, APTT in the last 168 hours.  LDH:  No results for input(s): LDH in the last 72 hours.  Microbiology:  Microbiology Results (last 7 days)     Procedure Component Value Units Date/Time    Fungus culture [[id number]] Collected:  03/28/18 1607    Order Status:  Completed Specimen:  Body Fluid from Lung, RLL Updated:  04/05/18 1118     Fungus (Mycology) Culture Culture in progress    Culture, Anaerobe [[id number]] Collected:  03/28/18 1607    Order Status:  Completed Specimen:  Body Fluid from Lung, RLL Updated:  04/02/18 1251     Anaerobic Culture No anaerobes isolated    Aerobic culture [[id number]] Collected:  03/28/18 1607    Order Status:  Completed Specimen:  Body Fluid from Lung, RLL Updated:  03/31/18 1209     Aerobic Bacterial Culture No growth    Blood culture [[id number]] Collected:  03/25/18 1554    Order Status:  Completed Specimen:  Blood from Peripheral, Upper Arm, Right Updated:  03/30/18 1812     Blood Culture, Routine No growth after 5 days.    Blood culture [[id number]] Collected:  03/25/18 1606    Order Status:  Completed Specimen:  Blood from Peripheral, Forearm, Left Updated:  03/30/18 1812     Blood Culture, Routine No growth after 5 days.    AFB Culture & Smear [[id number]] Collected:  03/28/18 1607    Order Status:  Completed Specimen:  Body Fluid from Lung, RLL Updated:  03/29/18 2127     AFB Culture & Smear Culture in progress     AFB CULTURE STAIN No acid fast bacilli seen.          I have reviewed all pertinent labs within the past 24 hours.    Estimated Creatinine Clearance: 49.5 mL/min (A) (based on SCr of 1.7 mg/dL (H)).    Diagnostic Results:  I have reviewed and interpreted all pertinent imaging results/findings within the past 24 hours.    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 Most recently has had ongoing issues with his lungs, had gotten 2 thoracenteses, after which he underwent VATS 01/19/18 followed by pleurex catheter placement and effusion drainage 01/11/18, subsequently had it removed 02/07/18 after drainage had decreased despite multiple attempts to drain it. Has been having significant coughing fits that result in him being near-syncopal at times, although difficult to say if he has passed out or not- patient most recently was admitted to the hospital for increased AGUILAR, coughing and pre-syncope 02/11-02/14/18 at [facility].   Patient was started on antibiotics for suspected bacterial infection, with some diarrhea, bronchitis, and possible pneumonia. Ct chest showed some pleural fluid collection and after talking with Dr. James, we arranged for him to receive a diagnostic tap with IR, from which the fluid collection demonstrated no growth or significant findings at all really. Cell counts do not appear to have been sent but will recheck. Patient states since his hospital stay, yesterday had a significant coughing fit again, after which he nearly passed out, is being seen in clinic today for this. Denies any cardiopulmonary complaints, no leg swelling, has some abdominal swelling, which is moderate for him. Denies significant shortness of breath with mild exertion, had 6MWT yesterday to see if he qualified for home O2, and his O2 sats actually improved after recovery from where he started initially. He and his wife admit he is in bed most days, not very active.     Mr. Crocker presented to the ED 3/11/18 with approximately 3 weeks of worsening diarrhea and 2-3 days of sinus pressure, drainage, and worsened cough.  He notes that he had a coughing fit last night and passed out, coughed throughout most of the night.  This also happened on 2/25/18.  He last saw Dr Ferreira in clinic on 2/20/18 where he was taken  off myfortic and switched to cellcept (he hadn't been on cellcept because of leukopenia when they tried post-transplant).  Though his diarrhea had improved after his last discharge, he states it has increased now to 15x/day, clear/yellow/green, no fevers, no exacerbating foods per say.  He was taken back off the cellcept and placed back on myfortic this past week and has not noticed immediate change in diarrhea. He also reports his baseline coughing fits havent improved and are minimally responsive to tessalon pearls.  Came on last night, he lost consciousness during a fit once which is relatively normal for him, and had two more during HPI.  He notes no sick contacts but does state he's had some URI symptoms as above.  His baseline vitals are usually -120 and BP 90s/60s-110s/70s.  He reports a history of intermittent diarrhea - did have Cdiff many years ago but this smells different.  No abdominal pain, no nausea, no vomiting.  No blood in diarrhea.  Appears last c-scope in 2015 with no evidence of infection or inflammatory processes.  He does report IBS which is different that this.       * Acute respiratory failure with hypoxia    - S/P Bronch and intubation 3/14 now post tracheostomy due to inability to extubate   - Pulmonology following to help manage vent wean. Appreciate Recs   - RSV positive early in hospitalization. Completed course of Ribavarin/IVIG. Per lung transplant protocol for severe RSV (given myelosuppression).   - ID following. Completed 7 day course of Meropenem/Linezolid.   - All cultures remain negative.         Restrictive cardiomyopathy    - Has known history of recurrent ascites and pleural effusions   - S/P thoracentesis X 2, followed by VATS/pleurex cath placement (January 2018) with removal of pleurex (February 2018) and 3rd thoracentesis 2/14/18 with no significant findings on fluid removal.        Type 1 diabetes mellitus with stage 3 chronic kidney disease    - Appreciate  Endocrine's recs  - Insulin gtt  - Recent blood transfusions will likely affect A1cs (will appear lower than they likely are)         Hypothyroidism due to Hashimoto's thyroiditis    - Appreciate Endocrine's recs  - Continue IV levothyroxine 88mcg        Acute renal failure superimposed on stage 4 chronic kidney disease    - Appreciate Nephrology recs.   - Intermittent CRRT.  -permacath placed in OR        Anemia    - transfused one unit yesterday         Heart transplanted    - TTE 3/26/18 showed normal graft function but has known history of restrictive CM post transplant.  - Continue hydrocortisone 25mg BID for now.  - Continue Tacro .5mg BID (sublingual) until enteral acces regained.  - Cellcept remains on hold.        Debility    - PT/OT daily   - This will take time and require adequate nutrition  - PEG tube placed, will follow up dietician recs          Anxiety    - Pt on nortriptyline/escitalopram at home. Held as no enteric feeding option at this point.   - Continue prn low dose xanax ODT.         Uninterrupted Critical Care/Counseling Time (not including procedures): 35 minutes     JOSE DANIEL Zabala  Heart Transplant  [facility]

## 2018-04-05 NOTE — PROGRESS NOTES
"[facility]  Endocrinology  Progress Note    Admit Date: 3/11/2018     Reason for Consult: abnormal TFTs    Surgical Procedure and Date: s/p heart transplant 2014     Diabetes diagnosis year: 5yo (T1DM)     Home Diabetes Medications:    Levemir 15u qHS  Novolog 4u AC     How often checking glucose at home? 4 times daily   BG readings on regimen: 150-200s  Hypoglycemia on the regimen?  Yes, rarely - treats appropriately (light snack, glucose tab)  Missed doses on regimen?  No        Diabetes Complications include:     Hyperglycemia, Hypoglycemia  and Diabetic chronic kidney disease          Complicating diabetes co morbidities:   N/A     HPI:   Patient is a 42 y.o. male with a diagnosis of T1DM, HTN, hypothyroidism, s/p heart transplant.  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 He required serial paracentesis and thoracentesis until he underwent a VATS with pleurX catheter placement on 1/11/2018 and removed 2/7/18.       Presented to hospital secondary to diarrhea and cough.  Upon initial labs, TSH was decreased (0.101) and free T4 1.33.  Endocrinology consulted to assist in management of these labs.  He was last seen in clinic by Kamala Vázquez NP 11/2017.   Previous hospitalization, endocrine consulted for same problem.  During that visit, recommended decreasing LT4 to 125mcg daily. Unfortunately, patient was discharged on previous dose of 150mcg daily.     Interval HPI:   Overnight events:  S/p permacath and G tube  To begin TF today at noon   Started on PDN 7.5mg daily and HC weaning off   Switched from IV to LT4 per G tube  Getting CRRT     BP (!) 104/57 (BP Location: Left arm, Patient Position: Lying)   Pulse (!) 116   Temp 97.8 °F (36.6 °C) (Axillary)   Resp 17   Ht 5' 7" (1.702 m)   Wt 63.1 kg (139 lb 1.8 oz)   SpO2 100%   BMI 21.79 kg/m²       Labs Reviewed and Include  "     Recent Labs  Lab 04/05/18  0314   *   CALCIUM 8.4*   ALBUMIN 2.3*   PROT 5.5*      K 4.2   CO2 17*      BUN 27*   CREATININE 1.7*   ALKPHOS 162*   ALT 14   AST 26   BILITOT 0.8     Lab Results   Component Value Date    WBC 4.52 04/05/2018    HGB 8.1 (L) 04/05/2018    HCT 25.6 (L) 04/05/2018    MCV 97 04/05/2018     (L) 04/05/2018       Recent Labs  Lab 03/30/18  0217   TSH 9.412*   FREET4 0.81     Lab Results   Component Value Date    HGBA1C 6.9 (H) 02/11/2018       Nutritional status:   Body mass index is 21.79 kg/m².  Lab Results   Component Value Date    ALBUMIN 2.3 (L) 04/05/2018    ALBUMIN 2.4 (L) 04/04/2018    ALBUMIN 2.4 (L) 04/04/2018    ALBUMIN 2.4 (L) 04/04/2018     Lab Results   Component Value Date    PREALBUMIN 5 (L) 03/15/2018    PREALBUMIN 18 (L) 03/24/2015    PREALBUMIN 19 (L) 12/17/2014       Estimated Creatinine Clearance: 49.5 mL/min (A) (based on SCr of 1.7 mg/dL (H)).    Accu-Checks  Recent Labs      04/03/18   2208  04/04/18   0001  04/04/18   0328  04/04/18   0750  04/04/18   1210  04/04/18   1253  04/04/18   1530  04/04/18   2016  04/04/18   2358  04/05/18   0335   POCTGLUCOSE  149*  151*  166*  113*  85  143*  216*  201*  238*  204*       Current Medications and/or Treatments Impacting Glycemic Control  Immunotherapy:  Immunosuppressants         Stop Route Frequency     tacrolimus (PROGRAF) 1 mg/mL oral syringe      -- PER G TUBE Daily     tacrolimus (PROGRAF) 1 mg/mL oral syringe      -- PER G TUBE Daily        Steroids:   Hormones     Start     Stop Route Frequency Ordered    04/06/18 0900  predniSONE tablet 7.5 mg      -- PER G TUBE Daily 04/05/18 0926    03/31/18 0900  hydrocortisone sodium succinate injection 25 mg      04/06 0859 IV 2 times daily 03/30/18 1711        Pressors:    Autonomic Drugs     None        Hyperglycemia/Diabetes Medications: Antihyperglycemics     Start     Stop Route Frequency Ordered    04/02/18 1019  insulin aspart U-100 pen 0-4  Units      -- SubQ As needed (PRN) 04/02/18 0919 04/02/18 0916  insulin regular (Humulin R) 100 Units in sodium chloride 0.9% 100 mL infusion      -- IV Continuous 04/02/18 0919          ASSESSMENT and PLAN    Type 1 diabetes mellitus with stage 3 chronic kidney disease    BG goal 140-1180    Rewrite transition at 0.2 units/hr, POC q4 Low dose correction  Anticipate increasing insulin rate when TF begun.   Noting that basal needs at goal on 0.2u/hr (~5 units)     Discussed with nurse, pt is T1DM, do not suspend insulin >1 hr       If feedings (TPN or TF) to be delayed, recommend starting dextrose gtt which will allow for uptitration in insulin gtt.     Discharge Recommendations:  TBD.        Hypothyroidism due to Hashimoto's thyroiditis    TSH 9 Possibly from missed doses from lack of PO access vs. Bowel edema with malabsorption, vs NTIS.  Reassuring that FT4 normal.     rec switching to LT4 137mcg per G tube as he was previously hyperthyroid on 150mcg daily.   Ideally should hold TF 1 hr before and 1 hr after given LT4      Ideally TF should be held         Recheck TFTs in 4 weeks.          Current use of steroid medication    Agree with PDN 7.5mg daily as HC is being weaned off                Acute renal failure superimposed on stage 4 chronic kidney disease     Avoid insulin stacking             Heart transplanted     Per transplant  Avoid hypoglycemia     enteral nutrition   May increase insulin needs    Palmira Dorsey MD  Endocrinology  [facility]

## 2018-04-05 NOTE — CONSULTS
Midline placed in left brachial , size [id number] Lot# [id number] Removal date on or before 5/4/18. Needle advanced into vessel with real time ultrasound guidance. Image recorded and saved.

## 2018-04-05 NOTE — ASSESSMENT & PLAN NOTE
TSH 9 Possibly from missed doses from lack of PO access vs. Bowel edema with malabsorption, vs NTIS.  Reassuring that FT4 normal.     rec switching to LT4 137mcg per G tube as he was previously hyperthyroid on 150mcg daily.   Ideally should hold TF 1 hr before and 1 hr after given LT4      Ideally TF should be held         Recheck TFTs in 4 weeks.

## 2018-04-05 NOTE — PROGRESS NOTES
[facility]  General Surgery  Progress Note    Subjective:     History of Present Illness:  Lv Crocker is a 42 year old male with PMH of OHTX in November 2014, Hashimoto's thyroiditis, T1DM, CKD and history of recurrent acsites and pleural effusions now admitted for respiratory failure from severe RSV. He underwent a right VATS drainage of effusion and Pleurx placement on 1/11/18 by Dr. James. Pleurx was removed in clinic on 2/7/18 due to minimal output and potential clogging. Recently admitted on 3/13/18 after he was found to be febrile and hypoxic in endoscopy lab.  Intubated on 3/14/18 for hypoxemic and hypercapnic resp failure. Chest CT at that time showed small right pleural effusion and bilateral subsegmental multifocal consolidation with air bronchograms more extensive in left lung. Since then he has had a complicated hospital stay for presumed severe RSV including acute kidney failure on CRRT, intermittent pressor requirement, DIC and several failed SBTs.    PSH significant for OHTx in 2014 and cholecystectomy. Prior to right pleurx placement, he was getting monthly paracenteses and thoracentesis.    Post-Op Info:  Procedure(s) (LRB):  INSERTION-CATHETER-PERM-A-CATH (Left)  INSERTION-TUBE-GASTROSTOMY (N/A)   1 Day Post-Op     Interval History: underwent perm cath placement and open g tube yesterday, no acute events    Medications:  Continuous Infusions:   insulin (HUMAN R) infusion (adults) 0.2 Units/hr (04/05/18 0740)     Scheduled Meds:   chlorhexidine  15 mL Mouth/Throat BID    famotidine (PF)  20 mg Intravenous Daily    fentaNYL  1 patch Transdermal Q72H    heparin (porcine)  5,000 Units Subcutaneous Q12H    hydrocortisone sodium succinate  25 mg Intravenous BID    levothyroxine  88 mcg Intravenous Daily    sodium chloride 0.9%  3 mL Intravenous Q8H    tacrolimus  0.5 mg Sublingual Daily    tacrolimus  1 mg Sublingual Daily     PRN Meds:sodium chloride,  alprazolam ODT, dextrose 50%, dextrose 50%, fentaNYL, glucagon (human recombinant), glucose, glucose, insulin aspart U-100, ondansetron, povidone-iodine     Review of patient's allergies indicates:   Allergen Reactions    No known drug allergies      Objective:     Vital Signs (Most Recent):  Temp: 97.5 °F (36.4 °C) (04/05/18 0300)  Pulse: 110 (04/05/18 0627)  Resp: 16 (04/05/18 0627)  BP: (!) 95/52 (04/05/18 0600)  SpO2: 100 % (04/05/18 0627) Vital Signs (24h Range):  Temp:  [97.1 °F (36.2 °C)-97.7 °F (36.5 °C)] 97.5 °F (36.4 °C)  Pulse:  [] 110  Resp:  [5-24] 16  SpO2:  [95 %-100 %] 100 %  BP: ()/(43-70) 95/52     Weight: 63.1 kg (139 lb 1.8 oz)  Body mass index is 21.79 kg/m².    Intake/Output - Last 3 Shifts       04/03 0700 - 04/04 0659 04/04 0700 - 04/05 0659 04/05 0700 - 04/06 0659    P.O.       I.V. (mL/kg) 3331.6 (52.8) 1023 (16.2)     Blood  593.8     IV Piggyback       Total Intake(mL/kg) 3331.6 (52.8) 1616.7 (25.6)     Urine (mL/kg/hr) 0 (0)      Emesis/NG output 0 (0)      Drains  60 (0)     Other 3132 (2.1)      Stool 0 (0)      Total Output 3132 60      Net +199.6 +1556.7             Urine Occurrence 0 x      Stool Occurrence 6 x 2 x     Emesis Occurrence 0 x            Physical Exam   Constitutional: He appears well-developed and well-nourished. No distress.   HENT:   Head: Normocephalic and atraumatic.   Eyes: EOM are normal. No scleral icterus.   Neck: Normal range of motion.   Trach in place    Cardiovascular: Regular rhythm.    tachycardic   Pulmonary/Chest: Effort normal. No respiratory distress.   Perm cath in place no signs of infection   Abdominal:   Soft, NT, previous chest tube scars noted  No distension, no fluid wave  g tube in place with green drainage, wound C/D/I   Musculoskeletal: He exhibits no edema or tenderness.   Neurological: He is alert.   Skin: Skin is warm and dry.       Significant Labs:  CBC:   Recent Labs  Lab 04/04/18 1951   WBC 4.98   RBC 2.70*   HGB 8.2*    HCT 26.2*   *   MCV 97   MCH 30.4   MCHC 31.3*     CMP:   Recent Labs  Lab 04/05/18  0314   *   CALCIUM 8.4*   ALBUMIN 2.3*   PROT 5.5*      K 4.2   CO2 17*      BUN 27*   CREATININE 1.7*   ALKPHOS 162*   ALT 14   AST 26   BILITOT 0.8       Significant Diagnostics:  I have reviewed and interpreted all pertinent imaging results/findings within the past 24 hours.    Assessment/Plan:     Acute renal failure superimposed on stage 4 chronic kidney disease    43 yo male w extensive past medical history, including dysphagia and acute renal failure    Perm cath in place, ok to use,  g tube to remain to gravity for 24 hours  Ok to begin feeding at noon  Outer bandage can be removed tomorrow  Staples to remain for 2 weeks  Please call with any concerns            John Chisholm MD  General Surgery  [facility]

## 2018-04-05 NOTE — SUBJECTIVE & OBJECTIVE
Interval History: underwent perm cath placement and open g tube yesterday, no acute events    Medications:  Continuous Infusions:   insulin (HUMAN R) infusion (adults) 0.2 Units/hr (04/05/18 0740)     Scheduled Meds:   chlorhexidine  15 mL Mouth/Throat BID    famotidine (PF)  20 mg Intravenous Daily    fentaNYL  1 patch Transdermal Q72H    heparin (porcine)  5,000 Units Subcutaneous Q12H    hydrocortisone sodium succinate  25 mg Intravenous BID    levothyroxine  88 mcg Intravenous Daily    sodium chloride 0.9%  3 mL Intravenous Q8H    tacrolimus  0.5 mg Sublingual Daily    tacrolimus  1 mg Sublingual Daily     PRN Meds:sodium chloride, alprazolam ODT, dextrose 50%, dextrose 50%, fentaNYL, glucagon (human recombinant), glucose, glucose, insulin aspart U-100, ondansetron, povidone-iodine     Review of patient's allergies indicates:   Allergen Reactions    No known drug allergies      Objective:     Vital Signs (Most Recent):  Temp: 97.5 °F (36.4 °C) (04/05/18 0300)  Pulse: 110 (04/05/18 0627)  Resp: 16 (04/05/18 0627)  BP: (!) 95/52 (04/05/18 0600)  SpO2: 100 % (04/05/18 0627) Vital Signs (24h Range):  Temp:  [97.1 °F (36.2 °C)-97.7 °F (36.5 °C)] 97.5 °F (36.4 °C)  Pulse:  [] 110  Resp:  [5-24] 16  SpO2:  [95 %-100 %] 100 %  BP: ()/(43-70) 95/52     Weight: 63.1 kg (139 lb 1.8 oz)  Body mass index is 21.79 kg/m².    Intake/Output - Last 3 Shifts       04/03 0700 - 04/04 0659 04/04 0700 - 04/05 0659 04/05 0700 - 04/06 0659    P.O.       I.V. (mL/kg) 3331.6 (52.8) 1023 (16.2)     Blood  593.8     IV Piggyback       Total Intake(mL/kg) 3331.6 (52.8) 1616.7 (25.6)     Urine (mL/kg/hr) 0 (0)      Emesis/NG output 0 (0)      Drains  60 (0)     Other 3132 (2.1)      Stool 0 (0)      Total Output 3132 60      Net +199.6 +1556.7             Urine Occurrence 0 x      Stool Occurrence 6 x 2 x     Emesis Occurrence 0 x            Physical Exam   Constitutional: He appears well-developed and well-nourished.  No distress.   HENT:   Head: Normocephalic and atraumatic.   Eyes: EOM are normal. No scleral icterus.   Neck: Normal range of motion.   Trach in place    Cardiovascular: Regular rhythm.    tachycardic   Pulmonary/Chest: Effort normal. No respiratory distress.   Perm cath in place no signs of infection   Abdominal:   Soft, NT, previous chest tube scars noted  No distension, no fluid wave  g tube in place with green drainage, wound C/D/I   Musculoskeletal: He exhibits no edema or tenderness.   Neurological: He is alert.   Skin: Skin is warm and dry.       Significant Labs:  CBC:   Recent Labs  Lab 04/04/18 1951   WBC 4.98   RBC 2.70*   HGB 8.2*   HCT 26.2*   *   MCV 97   MCH 30.4   MCHC 31.3*     CMP:   Recent Labs  Lab 04/05/18 0314   *   CALCIUM 8.4*   ALBUMIN 2.3*   PROT 5.5*      K 4.2   CO2 17*      BUN 27*   CREATININE 1.7*   ALKPHOS 162*   ALT 14   AST 26   BILITOT 0.8       Significant Diagnostics:  I have reviewed and interpreted all pertinent imaging results/findings within the past 24 hours.

## 2018-04-05 NOTE — ASSESSMENT & PLAN NOTE
43 yo male w extensive past medical history, including dysphagia and acute renal failure    Perm cath in place, ok to use,  g tube to remain to gravity for 24 hours  Ok to begin feeding at noon  Outer bandage can be removed tomorrow  Staples to remain for 2 weeks  Please call with any concerns

## 2018-04-05 NOTE — ASSESSMENT & PLAN NOTE
- PT/OT daily   - This will take time and require adequate nutrition  - PEG tube placed, will follow up dietician recs

## 2018-04-05 NOTE — PROGRESS NOTES
[facility]  Nephrology  Progress Note    Patient Name: Lv Crocker  MRN: [id number]  Admission Date: 3/11/2018  Hospital Length of Stay: 24 days  Attending Provider: Heriberto Rosa MD   Primary Care Physician: Philippe Mohr MD  Principal Problem:Acute respiratory failure with hypoxia    Subjective:     HPI: Mr. Crocker is a 41 yo WM with T1DM, Hashimoto thyroiditis, h/o OHTx 11/2014, and CKD stage 4 who was admitted on 3/11/18 for persistent diarrhea. He reported a 3 week history of diarrhea up to 15x/day. Associated symptoms including URI symptoms, coughing fits, and coughing syncope. Prograf level was 14.3. His post-heart transplant course has been complicated by [facility] 4/2015, CMV, post-transplant restrictive cardiomyopathy, recurrent pleural effusions/ascites requiring monthly thoracenteses/paracenteses, VATS 1/11/18 with Pleur-X catheter (now removed), and CKD stage 4 with baseline sCr 2.6-3.0. Baseline -120s and baseline BP 90s-110s/60-70s. Upon admission he was started on IVF and diarrhea workup was initiated. On 3/12 he was noted to have decreased UOP despite fluids; NS was increased to 100cc/hr. He was scheduled for a colonoscopy on 3/13 however procedure was cancelled due to hypoxia and fever. He was transferred to the ICU for closer monitoring. He continued to have oliguria overnight. Bladder scan revealed 200cc of urine but patient refused osullivan catheter placement. Wife reports that patient always has a hard time urinating again after osullivan catheters are placed/removed so he refuses them. He remained hypoxic despite FiO2 70% and ABG revealed combined respiratory and metabolic acidosis with pH 7.17. He was started on BiPAP as well as a bicarb infusion at 50cc/hr. ABG with mild improvement. AM labs revealed K 6.2 and he was shifted and given kayexalate.Trialysis catheter was placed this morning and he subsequently developed hypotension and was started on levophed. He  was intubated later this morning. Nephrology consulted for CACHORRO. All history obtained by primary team and chart review as patient was intubated/sedated on exam. Consent for dialysis obtained by patient's wife and placed in chart. Of note, both of patient's parents were on dialysis.     Interval History:   S/p PEG/TDC yesterday. No events overnight. Planning for transfer to LTAC next week. Net +1L yesterday. No hourly intake. Wife reports patient's TDC was being used for medications and lab collection this morning. Midline being placed for venous access.     Review of patient's allergies indicates:   Allergen Reactions    No known drug allergies      Current Facility-Administered Medications   Medication Frequency    0.9%  NaCl infusion (CRRT USE ONLY) Continuous    0.9%  NaCl infusion (for blood administration) Q24H PRN    alprazolam ODT dissolvable tablet 0.5 mg TID PRN    chlorhexidine 0.12 % solution 15 mL BID    dextrose 50% injection 12.5 g PRN    dextrose 50% injection 25 g PRN    [START ON 4/6/2018] escitalopram oxalate tablet 20 mg Daily    famotidine tablet 20 mg QHS    glucagon (human recombinant) injection 1 mg PRN    glucose chewable tablet 16 g PRN    glucose chewable tablet 24 g PRN    heparin (porcine) injection 5,000 Units Q12H    hydrocortisone sodium succinate injection 25 mg BID    insulin aspart U-100 pen 0-4 Units PRN    insulin regular (Humulin R) 100 Units in sodium chloride 0.9% 100 mL infusion Continuous    [START ON 4/6/2018] levothyroxine tablet 137 mcg Daily    ondansetron disintegrating tablet 8 mg Q6H PRN    oxyCODONE 5 mg/5 mL solution 10 mg TID    oxyCODONE 5 mg/5 mL solution 10 mg Q4H PRN    povidone-iodine 10 % ointment PRN    [START ON 4/6/2018] predniSONE tablet 7.5 mg Daily    sodium chloride 0.9% flush 3 mL Q8H    tacrolimus (PROGRAF) 1 mg/mL oral syringe Daily    [START ON 4/6/2018] tacrolimus (PROGRAF) 1 mg/mL oral syringe Daily       Objective:      Vital Signs (Most Recent):  Temp: 98 °F (36.7 °C) (04/05/18 1100)  Pulse: (!) 120 (04/05/18 1300)  Resp: 15 (04/05/18 1300)  BP: (!) 85/48 (04/05/18 1300)  SpO2: 100 % (04/05/18 1300)  O2 Device (Oxygen Therapy): ventilator (04/05/18 1306) Vital Signs (24h Range):  Temp:  [97.1 °F (36.2 °C)-98 °F (36.7 °C)] 98 °F (36.7 °C)  Pulse:  [] 120  Resp:  [5-24] 15  SpO2:  [95 %-100 %] 100 %  BP: ()/(46-70) 85/48     Weight: 63.1 kg (139 lb 1.8 oz) (04/05/18 1300)  Body mass index is 21.79 kg/m².  Body surface area is 1.73 meters squared.    I/O last 3 completed shifts:  In: 3923.8 [I.V.:3330.1; Blood:593.8]  Out: 2889 [Drains:60; Other:2829]    Physical Exam   Constitutional: He appears well-developed and well-nourished.   HENT:   Head: Normocephalic and atraumatic.   Eyes: Conjunctivae and EOM are normal.   Cardiovascular: Normal rate and regular rhythm.    Pulmonary/Chest: He has no wheezes. He has no rales.   Abdominal: Soft. He exhibits no distension.   Musculoskeletal: He exhibits no edema or deformity.   Skin: Skin is warm and dry. He is not diaphoretic.       Significant Labs:  ABGs:   Recent Labs  Lab 04/05/18  0626   PH 7.256*   PCO2 39.1   HCO3 17.4*   POCSATURATED 68*   BE -10     CBC:   Recent Labs  Lab 04/05/18  0818   WBC 4.52   RBC 2.63*   HGB 8.1*   HCT 25.6*   *   MCV 97   MCH 30.8   MCHC 31.6*     CMP:   Recent Labs  Lab 04/05/18  0314   *   CALCIUM 8.4*   ALBUMIN 2.3*   PROT 5.5*      K 4.2   CO2 17*      BUN 27*   CREATININE 1.7*   ALKPHOS 162*   ALT 14   AST 26   BILITOT 0.8     All labs within the past 24 hours have been reviewed.     Significant Imaging:  Labs: Reviewed  X-Ray: Reviewed    Assessment/Plan:     Acute renal failure superimposed on stage 4 chronic kidney disease    - baseline sCr 2.6-3.0 consistent with CKD stage IV  - anuric CACHORRO; likely ischemic ATN from prolonged pre-renal state (diarrhea) in setting of prograf renal vasoconstriction; cannot  r/o septic ATN or Prograf toxicity  - SLED started 3/14  - remains anuric. Currently working on spacing out RRT treatments  - last received SLED x 12 hours on 4/3  - will plan for SLED x 12 hours again tonight. Patient requires 3K/35HCO3. UF goal 250-300cc/hr to match intake from last 24 hours  - tentatively planning to hold tomorrow. Will reassess on Saturday to determine need for SLED vs HD; ideally will have him tolerating HD by Monday or Tuesday (if BP allows)  - do not use tunneled HD catheter for anything other than dialysis.             Amairani Alegria, PGY-5  Nephrology Fellow  [facility]  Pager: [phone]        I have reviewed and concur with the fellow's history, physical, assessment, and plan. I have personally interviewed and examined the patient at bedside.

## 2018-04-05 NOTE — SUBJECTIVE & OBJECTIVE
"Interval HPI:   Overnight events:  S/p permacath and G tube  To begin TF today at noon   Started on PDN 7.5mg daily and HC weaning off   Switched from IV to LT4 per G tube  Getting CRRT     BP (!) 104/57 (BP Location: Left arm, Patient Position: Lying)   Pulse (!) 116   Temp 97.8 °F (36.6 °C) (Axillary)   Resp 17   Ht 5' 7" (1.702 m)   Wt 63.1 kg (139 lb 1.8 oz)   SpO2 100%   BMI 21.79 kg/m²     Labs Reviewed and Include      Recent Labs  Lab 04/05/18  0314   *   CALCIUM 8.4*   ALBUMIN 2.3*   PROT 5.5*      K 4.2   CO2 17*      BUN 27*   CREATININE 1.7*   ALKPHOS 162*   ALT 14   AST 26   BILITOT 0.8     Lab Results   Component Value Date    WBC 4.52 04/05/2018    HGB 8.1 (L) 04/05/2018    HCT 25.6 (L) 04/05/2018    MCV 97 04/05/2018     (L) 04/05/2018       Recent Labs  Lab 03/30/18  0217   TSH 9.412*   FREET4 0.81     Lab Results   Component Value Date    HGBA1C 6.9 (H) 02/11/2018       Nutritional status:   Body mass index is 21.79 kg/m².  Lab Results   Component Value Date    ALBUMIN 2.3 (L) 04/05/2018    ALBUMIN 2.4 (L) 04/04/2018    ALBUMIN 2.4 (L) 04/04/2018    ALBUMIN 2.4 (L) 04/04/2018     Lab Results   Component Value Date    PREALBUMIN 5 (L) 03/15/2018    PREALBUMIN 18 (L) 03/24/2015    PREALBUMIN 19 (L) 12/17/2014       Estimated Creatinine Clearance: 49.5 mL/min (A) (based on SCr of 1.7 mg/dL (H)).    Accu-Checks  Recent Labs      04/03/18 2208  04/04/18   0001  04/04/18   0328  04/04/18   0750  04/04/18   1210  04/04/18   1253  04/04/18   1530  04/04/18 2016 04/04/18   2358  04/05/18   0335   POCTGLUCOSE  149*  151*  166*  113*  85  143*  216*  201*  238*  204*       Current Medications and/or Treatments Impacting Glycemic Control  Immunotherapy:  Immunosuppressants         Stop Route Frequency     tacrolimus (PROGRAF) 1 mg/mL oral syringe      -- PER G TUBE Daily     tacrolimus (PROGRAF) 1 mg/mL oral syringe      -- PER G TUBE Daily        Steroids:   Hormones     " Start     Stop Route Frequency Ordered    04/06/18 0900  predniSONE tablet 7.5 mg      -- PER G TUBE Daily 04/05/18 0926 03/31/18 0900  hydrocortisone sodium succinate injection 25 mg      04/06 0859 IV 2 times daily 03/30/18 1711        Pressors:    Autonomic Drugs     None        Hyperglycemia/Diabetes Medications: Antihyperglycemics     Start     Stop Route Frequency Ordered    04/02/18 1019  insulin aspart U-100 pen 0-4 Units      -- SubQ As needed (PRN) 04/02/18 0919 04/02/18 0916  insulin regular (Humulin R) 100 Units in sodium chloride 0.9% 100 mL infusion      -- IV Continuous 04/02/18 0919

## 2018-04-05 NOTE — SUBJECTIVE & OBJECTIVE
Interval History:   S/p PEG/TDC yesterday. No events overnight. Planning for transfer to [facility] next week. Net +1L yesterday. No hourly intake. Midline being placed for venous access.     Review of patient's allergies indicates:   Allergen Reactions    No known drug allergies      Current Facility-Administered Medications   Medication Frequency    0.9%  NaCl infusion (CRRT USE ONLY) Continuous    0.9%  NaCl infusion (for blood administration) Q24H PRN    alprazolam ODT dissolvable tablet 0.5 mg TID PRN    chlorhexidine 0.12 % solution 15 mL BID    dextrose 50% injection 12.5 g PRN    dextrose 50% injection 25 g PRN    [START ON 4/6/2018] escitalopram oxalate tablet 20 mg Daily    famotidine tablet 20 mg QHS    glucagon (human recombinant) injection 1 mg PRN    glucose chewable tablet 16 g PRN    glucose chewable tablet 24 g PRN    heparin (porcine) injection 5,000 Units Q12H    hydrocortisone sodium succinate injection 25 mg BID    insulin aspart U-100 pen 0-4 Units PRN    insulin regular (Humulin R) 100 Units in sodium chloride 0.9% 100 mL infusion Continuous    [START ON 4/6/2018] levothyroxine tablet 137 mcg Daily    ondansetron disintegrating tablet 8 mg Q6H PRN    oxyCODONE 5 mg/5 mL solution 10 mg TID    oxyCODONE 5 mg/5 mL solution 10 mg Q4H PRN    povidone-iodine 10 % ointment PRN    [START ON 4/6/2018] predniSONE tablet 7.5 mg Daily    sodium chloride 0.9% flush 3 mL Q8H    tacrolimus (PROGRAF) 1 mg/mL oral syringe Daily    [START ON 4/6/2018] tacrolimus (PROGRAF) 1 mg/mL oral syringe Daily       Objective:     Vital Signs (Most Recent):  Temp: 98 °F (36.7 °C) (04/05/18 1100)  Pulse: (!) 120 (04/05/18 1300)  Resp: 15 (04/05/18 1300)  BP: (!) 85/48 (04/05/18 1300)  SpO2: 100 % (04/05/18 1300)  O2 Device (Oxygen Therapy): ventilator (04/05/18 1306) Vital Signs (24h Range):  Temp:  [97.1 °F (36.2 °C)-98 °F (36.7 °C)] 98 °F (36.7 °C)  Pulse:  [] 120  Resp:  [5-24] 15  SpO2:  [95  %-100 %] 100 %  BP: ()/(46-70) 85/48     Weight: 63.1 kg (139 lb 1.8 oz) (04/05/18 1300)  Body mass index is 21.79 kg/m².  Body surface area is 1.73 meters squared.    I/O last 3 completed shifts:  In: 3923.8 [I.V.:3330.1; Blood:593.8]  Out: 2889 [Drains:60; Other:2829]    Physical Exam   Constitutional: He appears well-developed and well-nourished.   HENT:   Head: Normocephalic and atraumatic.   Eyes: Conjunctivae and EOM are normal.   Cardiovascular: Normal rate and regular rhythm.    Pulmonary/Chest: He has no wheezes. He has no rales.   Abdominal: Soft. He exhibits no distension.   Musculoskeletal: He exhibits no edema or deformity.   Skin: Skin is warm and dry. He is not diaphoretic.       Significant Labs:  ABGs:   Recent Labs  Lab 04/05/18  0626   PH 7.256*   PCO2 39.1   HCO3 17.4*   POCSATURATED 68*   BE -10     CBC:   Recent Labs  Lab 04/05/18  0818   WBC 4.52   RBC 2.63*   HGB 8.1*   HCT 25.6*   *   MCV 97   MCH 30.8   MCHC 31.6*     CMP:   Recent Labs  Lab 04/05/18  0314   *   CALCIUM 8.4*   ALBUMIN 2.3*   PROT 5.5*      K 4.2   CO2 17*      BUN 27*   CREATININE 1.7*   ALKPHOS 162*   ALT 14   AST 26   BILITOT 0.8     All labs within the past 24 hours have been reviewed.     Significant Imaging:  Labs: Reviewed  X-Ray: Reviewed

## 2018-04-05 NOTE — CONSULTS
"  [facility]  Adult Nutrition  Consult Note    SUMMARY     Recommendations    1. TF recommendations: Glucerna 1.5 @ 50 mL/hr to provide 1800 calories (101% EEN), 99 g of protein (100% EPN), 911 mL fluid.   2. If renal formula desired, recommend Novasource @ 40 mL/hr to provide 1920 calories (107% EEN), 87 g of protein (91% EPN), 688 mL fluid.    - Hold for residuals >500 mL; additional fluid per MD.   3. RD to monitor & follow-up.    Goals: Meet % EEN, EPN  Nutrition Goal Status: goal not met  Communication of RD Recs: reviewed with RN    Reason for Assessment    Reason for Assessment: RD follow-up  Diagnosis: other (see comments) (Resp. fx)  Relevant Medical History: Hashimoto's disease, HTN, HLD, DM, CHF, Heart tx (2014)  Interdisciplinary Rounds: did not attend    General Information Comments: S/p trach/PEG/permacath placement. Pt on CRRT.  Nutrition Discharge Planning: Unable to determine    Nutrition/Diet History    Patient Reported Diet/Restrictions/Preferences: other (see comments) (HAO)  Do you have any cultural, spiritual, Sikh conflicts, given your current situation?: none reported   Factors Affecting Nutritional Intake: NPO, on mechanical ventilation    Anthropometrics    Temp: 98 °F (36.7 °C)  Height Method: Stated  Height: 5' 7" (170.2 cm)  Height (inches): 67 in  Weight Method: Bed Scale  Weight: 63.1 kg (139 lb 1.8 oz)  Weight (lb): 139.11 lb  Ideal Body Weight (IBW), Male: 148 lb  % Ideal Body Weight, Male (lb): 93.99 lb  BMI (Calculated): 21.8  BMI Grade: 18.5-24.9 - normal  Usual Body Weight (UBW), kg:  (HAO)     Lab/Procedures/Meds    Pertinent Labs Reviewed: reviewed  Pertinent Labs Comments: BUN 27, Creat 1.7, GFR 48, Gluc 213, A1C 6.9  Pertinent Medications Reviewed: reviewed  Pertinent Medications Comments: Insulin    Physical Findings/Assessment    Overall Physical Appearance: on ventilator support, other (see comments) (Trach)  Tubes: gastrostomy " tube  Oral/Mouth Cavity: WDL  Skin: incision(s)    Estimated/Assessed Needs    Weight Used For Calorie Calculations: 81 kg (178 lb 9.2 oz) (Dosing wt)  Energy Calorie Requirements (kcal): 1785 kcal/d  Energy Need Method: [facility]     Protein Requirements:  g/d (1.2-1.5 g/kg)  Weight Used For Protein Calculations: 81 kg (178 lb 9.2 oz)     Fluid Need Method: other (see comments) (Per MD or 1 mL/kcal)     CHO Requirement: 50% total kcals    Nutrition Prescription Ordered    Current Diet Order: NPO    Nutrition Risk    Level of Risk/Frequency of Follow-up: high     Assessment and Plan    * Acute respiratory failure with hypoxia      Nutrition Problem  Inadequate energy intake    Related to (etiology):   Inability to consume sufficient energy    Signs and Symptoms (as evidenced by):   NPO with no alternate means of nutrition     Nutrition Diagnosis Status:   Continues         Monitor and Evaluation    Food and Nutrient Intake: enteral nutrition intake  Food and Nutrient Adminstration: enteral and parenteral nutrition administration  Physical Activity and Function: nutrition-related ADLs and IADLs  Anthropometric Measurements: weight, weight change  Biochemical Data, Medical Tests and Procedures: lipid profile, inflammatory profile, glucose/endocrine profile, gastrointestinal profile, electrolyte and renal panel  Nutrition-Focused Physical Findings: overall appearance     Nutrition Follow-Up    RD Follow-up?: Yes

## 2018-04-06 NOTE — PLAN OF CARE
Problem: SLP Goal  Goal: SLP Goal  Speech Language Pathology Goals  Goals expected to be met by 4/13/18    1.  Pt will tolerate PMSV for 3 min w/ no change in respiratory status and fair voicing produced.  2.  Pt will complete further evaluation of cognitive-linguistic communication skills to determine the need for additional goals.      Outcome: Ongoing (interventions implemented as appropriate)  PMSV evaluation completed.  Please refer to full report to follow.  Thank you.    Jeanine Tabares, SRUTHI-SLP  4/6/2018

## 2018-04-06 NOTE — PROGRESS NOTES
"[facility]  Endocrinology  Progress Note    Admit Date: 3/11/2018     Reason for Consult: abnormal TFTs    Surgical Procedure and Date: s/p heart transplant 2014     Diabetes diagnosis year: 5yo (T1DM)     Home Diabetes Medications:    Levemir 15u qHS  Novolog 4u AC     How often checking glucose at home? 4 times daily   BG readings on regimen: 150-200s  Hypoglycemia on the regimen?  Yes, rarely - treats appropriately (light snack, glucose tab)  Missed doses on regimen?  No        Diabetes Complications include:     Hyperglycemia, Hypoglycemia  and Diabetic chronic kidney disease          Complicating diabetes co morbidities:   N/A     HPI:   Patient is a 42 y.o. male with a diagnosis of T1DM, HTN, hypothyroidism, s/p heart transplant.  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 He required serial paracentesis and thoracentesis until he underwent a VATS with pleurX catheter placement on 1/11/2018 and removed 2/7/18.       Presented to hospital secondary to diarrhea and cough.  Upon initial labs, TSH was decreased (0.101) and free T4 1.33.  Endocrinology consulted to assist in management of these labs.  He was last seen in clinic by Kamala Vázquez NP 11/2017.   Previous hospitalization, endocrine consulted for same problem.  During that visit, recommended decreasing LT4 to 125mcg daily. Unfortunately, patient was discharged on previous dose of 150mcg daily.     Interval HPI:   Overnight events:  TF infusing per G tube  BG at goal on transition 0.4u/hr   On PDN 7.5 daily, HC being weaned off   Getting CRRT  Possible stepdown to floor today    BP (!) 111/56   Pulse (!) 117   Temp 98 °F (36.7 °C) (Oral)   Resp 15   Ht 5' 7" (1.702 m)   Wt 63.1 kg (139 lb 1.8 oz)   SpO2 100%   BMI 21.79 kg/m²       Labs Reviewed and Include      Recent Labs  Lab 04/06/18  0255   *  147*   CALCIUM " 7.9*  7.9*   ALBUMIN 2.4*  2.4*   PROT 5.7*     137   K 3.7  3.7   CO2 26  26     100   BUN 9  9   CREATININE 0.8  0.8   ALKPHOS 167*   ALT 7*   AST 20   BILITOT 0.9     Lab Results   Component Value Date    WBC 3.49 (L) 04/06/2018    HGB 7.9 (L) 04/06/2018    HCT 23.8 (L) 04/06/2018    MCV 95 04/06/2018     (L) 04/06/2018     No results for input(s): TSH, FREET4 in the last 168 hours.  Lab Results   Component Value Date    HGBA1C 5.1 04/05/2018       Nutritional status:   Body mass index is 21.79 kg/m².  Lab Results   Component Value Date    ALBUMIN 2.4 (L) 04/06/2018    ALBUMIN 2.4 (L) 04/06/2018    ALBUMIN 2.3 (L) 04/05/2018     Lab Results   Component Value Date    PREALBUMIN 5 (L) 03/15/2018    PREALBUMIN 18 (L) 03/24/2015    PREALBUMIN 19 (L) 12/17/2014       Estimated Creatinine Clearance: 105.2 mL/min (based on SCr of 0.8 mg/dL).    Accu-Checks  Recent Labs      04/05/18   1557  04/05/18   1709  04/05/18   2030  04/05/18   2214  04/05/18   2359  04/06/18   0219  04/06/18   0407  04/06/18   0501  04/06/18   0737  04/06/18   0927   POCTGLUCOSE  314*  316*  248*  216*  150*  128*  146*  138*  116*  112*       Current Medications and/or Treatments Impacting Glycemic Control  Immunotherapy:  Immunosuppressants         Stop Route Frequency     tacrolimus (PROGRAF) 1 mg/mL oral syringe      -- PER G TUBE Daily     tacrolimus (PROGRAF) 1 mg/mL oral syringe      -- PER G TUBE Daily        Steroids:   Hormones     Start     Stop Route Frequency Ordered    04/06/18 0900  predniSONE tablet 7.5 mg      -- PER G TUBE Daily 04/05/18 0926        Pressors:    Autonomic Drugs     None        Hyperglycemia/Diabetes Medications: Antihyperglycemics     Start     Stop Route Frequency Ordered    04/07/18 0800  insulin aspart U-100 pen 1 Units      -- SubQ Every 24 hours (non-standard times) 04/06/18 1104    04/07/18 0400  insulin aspart U-100 pen 1 Units      -- SubQ Every 24 hours (non-standard times)  04/06/18 1104    04/07/18 0000  insulin aspart U-100 pen 1 Units      -- SubQ Every 24 hours (non-standard times) 04/06/18 1104    04/06/18 2000  insulin aspart U-100 pen 1 Units      -- SubQ Every 24 hours (non-standard times) 04/06/18 1104    04/06/18 1600  insulin aspart U-100 pen 1 Units      -- SubQ Every 24 hours (non-standard times) 04/06/18 1104    04/06/18 1200  insulin aspart U-100 pen 1 Units      -- SubQ Every 24 hours (non-standard times) 04/06/18 1104    04/06/18 1115  insulin detemir U-100 pen 3 Units      -- SubQ 2 times daily 04/06/18 1102    04/06/18 1103  insulin aspart U-100 pen 0-5 Units      -- SubQ Every 4 hours PRN 04/06/18 1104          ASSESSMENT and PLAN    Type 1 diabetes mellitus with stage 3 chronic kidney disease    BG goal 140-1180    Stop transition   Start levemir 3 units bid   Start novolog 1 units q4hr, low correction, q4h checks while on continous TF      Pt is T1DM, do not hold levemir  If BG trending down, will stop scheduled novolog     Discharge Recommendations:  TBD.        On enteral nutrition    May increase insulin needs            Hypothyroidism due to Hashimoto's thyroiditis    Continue Lt4 137mcg per G tube    Ideally should hold TF 1 hr before and 1 hr after given LT4      Recheck TFTs in 4 weeks (around 5/6)            Current use of steroid medication     Agree with PDN 7.5mg daily as HC is being weaned off                         Acute renal failure superimposed on stage 4 chronic kidney disease     Avoid insulin stacking             Heart transplanted     Per transplant  Avoid hypoglycemia       Palmira Dorsey MD  Endocrinology  [facility]

## 2018-04-06 NOTE — PLAN OF CARE
Problem: Occupational Therapy Goal  Goal: Occupational Therapy Goal  Goals to be met by: 4/7/2018    Pt will follow 75% of motor commands with <2 verbal cues for repetition of task to maximize engagement in grooming task.  Pt will complete feeding with mod A.   Pt will complete supine with HOB slightly elevated to seated at EOB with mod A in prep for seated grooming task.  Pt will engage in BUE exercises in orders to increase strength to 3+/5 in BUE's to increase engagement in seated grooming task  Pt will sit at EOB for approx 10 minutes with mod A and unilateral UE support to complete grooming task  Pt will complete sit>stand from EOB with bed in lowest position with mod A in prep for transfer to [facility] [place] [place]   Outcome: Ongoing (interventions implemented as appropriate)  The above goals remain appropriate. AMADOU Murillo  4/6/2018

## 2018-04-06 NOTE — ASSESSMENT & PLAN NOTE
- Appreciate Endocrine's recs  - Insulin gtt per endocrine recs  - Recent blood transfusions will likely affect A1cs (will appear lower than they likely are)

## 2018-04-06 NOTE — ASSESSMENT & PLAN NOTE
- TTE 3/26/18 showed normal graft function but has known history of restrictive CM post transplant.  - Continue pred 7.5  - Continue Tacro per G tube 1/2  - Cellcept remains on hold.

## 2018-04-06 NOTE — SUBJECTIVE & OBJECTIVE
Interval History: s/p PEG and perma cath in OR. Feels good this AM. Trach collar today and will evaluate for passy skye valve as patient tries to communicate.    Continuous Infusions:    Scheduled Meds:   chlorhexidine  15 mL Mouth/Throat BID    escitalopram oxalate  20 mg Per G Tube Daily    famotidine  20 mg Per G Tube QHS    heparin (porcine)  5,000 Units Subcutaneous Q12H    [START ON 4/7/2018] insulin aspart U-100  1 Units Subcutaneous Q24H    [START ON 4/7/2018] insulin aspart U-100  1 Units Subcutaneous Q24H    [START ON 4/7/2018] insulin aspart U-100  1 Units Subcutaneous Q24H    insulin aspart U-100  1 Units Subcutaneous Q24H    insulin aspart U-100  1 Units Subcutaneous Q24H    insulin aspart U-100  1 Units Subcutaneous Q24H    insulin detemir U-100  3 Units Subcutaneous BID    levothyroxine  137 mcg Per G Tube Daily    oxyCODONE  10 mg Per G Tube TID    predniSONE  7.5 mg Per G Tube Daily    sodium chloride 0.9%  3 mL Intravenous Q8H    tacrolimus  2 mg Per G Tube Daily    tacrolimus  1 mg Per G Tube Daily     PRN Meds:sodium chloride, alprazolam ODT, dextrose 50%, glucagon (human recombinant), insulin aspart U-100, ondansetron, ondansetron, oxyCODONE, povidone-iodine    Review of patient's allergies indicates:   Allergen Reactions    No known drug allergies      Objective:     Vital Signs (Most Recent):  Temp: 98 °F (36.7 °C) (04/06/18 0730)  Pulse: (!) 119 (04/06/18 1310)  Resp: 10 (04/06/18 1310)  BP: (!) 100/59 (04/06/18 1100)  SpO2: 100 % (04/06/18 1310) Vital Signs (24h Range):  Temp:  [97.5 °F (36.4 °C)-98 °F (36.7 °C)] 98 °F (36.7 °C)  Pulse:  [110-124] 119  Resp:  [7-20] 10  SpO2:  [100 %] 100 %  BP: ()/(45-63) 100/59     Patient Vitals for the past 72 hrs (Last 3 readings):   Weight   04/05/18 1300 63.1 kg (139 lb 1.8 oz)   04/04/18 0300 63.1 kg (139 lb 1.8 oz)     Body mass index is 21.79 kg/m².      Intake/Output Summary (Last 24 hours) at 04/06/18 1408  Last data filed  at 04/06/18 1000   Gross per 24 hour   Intake          2699.54 ml   Output             3747 ml   Net         -1047.46 ml     Hemodynamic Parameters:    Physical Exam   Constitutional: He appears well-developed and well-nourished. No distress.   HENT:   Head: Normocephalic and atraumatic.   Eyes: EOM are normal. No scleral icterus.   Neck: Normal range of motion.   Trach in place    Cardiovascular: Regular rhythm.    tachycardic   Pulmonary/Chest: Effort normal. No respiratory distress.   Perm cath in place no signs of infection   Abdominal:   Soft, NT, previous chest tube scars noted  No distension, no fluid wave  g tube in place with green drainage, wound C/D/I   Musculoskeletal: He exhibits no edema or tenderness.   Neurological: He is alert.   Skin: Skin is warm and dry.     Significant Labs:  CBC:    Recent Labs  Lab 04/05/18  0818 04/05/18 2029 04/06/18  0841   WBC 4.52 4.39 3.49*   RBC 2.63* 2.70* 2.51*   HGB 8.1* 8.4* 7.9*   HCT 25.6* 25.4* 23.8*   * 129* 109*   MCV 97 94 95   MCH 30.8 31.1* 31.5*   MCHC 31.6* 33.1 33.2     BNP:  No results for input(s): BNP in the last 168 hours.    Invalid input(s): BNPTRIAGELBLO  CMP:    Recent Labs  Lab 04/04/18  0326  04/05/18  0314 04/05/18  1409 04/05/18  1713 04/05/18  2138 04/06/18  0255   *  121*  < > 213* 325*  --  209* 147*  147*   CALCIUM 8.1*  8.1*  < > 8.4* 8.6*  --  8.5* 7.9*  7.9*   ALBUMIN 2.4*  2.4*  < > 2.3* 2.4*  --  2.3* 2.4*  2.4*   PROT 5.5*  --  5.5*  --   --   --  5.7*     138  < > 139 137  --  139 137  137   K 3.8  3.8  < > 4.2 4.4 4.2 4.0 3.7  3.7   CO2 24  24  < > 17* 15*  --  22* 26  26     101  < > 101 99  --  102 100  100   BUN 4*  4*  < > 27* 38*  --  38* 9  9   CREATININE 0.5  0.5  < > 1.7* 2.5*  --  2.3* 0.8  0.8   ALKPHOS 172*  --  162*  --   --   --  167*   ALT 17  --  14  --   --   --  7*   AST 27  --  26  --   --   --  20   BILITOT 1.1*  --  0.8  --   --   --  0.9   < > = values in this  interval not displayed.   Coagulation:   No results for input(s): PT, INR, APTT in the last 168 hours.  LDH:  No results for input(s): LDH in the last 72 hours.  Microbiology:  Microbiology Results (last 7 days)     Procedure Component Value Units Date/Time    Fungus culture [[id number]] Collected:  03/28/18 1607    Order Status:  Completed Specimen:  Body Fluid from Lung, RLL Updated:  04/05/18 1118     Fungus (Mycology) Culture Culture in progress    Culture, Anaerobe [[id number]] Collected:  03/28/18 1607    Order Status:  Completed Specimen:  Body Fluid from Lung, RLL Updated:  04/02/18 1251     Anaerobic Culture No anaerobes isolated    Aerobic culture [[id number]] Collected:  03/28/18 1607    Order Status:  Completed Specimen:  Body Fluid from Lung, RLL Updated:  03/31/18 1209     Aerobic Bacterial Culture No growth    Blood culture [[id number]] Collected:  03/25/18 1554    Order Status:  Completed Specimen:  Blood from Peripheral, Upper Arm, Right Updated:  03/30/18 1812     Blood Culture, Routine No growth after 5 days.    Blood culture [[id number]] Collected:  03/25/18 1606    Order Status:  Completed Specimen:  Blood from Peripheral, Forearm, Left Updated:  03/30/18 1812     Blood Culture, Routine No growth after 5 days.          I have reviewed all pertinent labs within the past 24 hours.    Estimated Creatinine Clearance: 105.2 mL/min (based on SCr of 0.8 mg/dL).    Diagnostic Results:  I have reviewed and interpreted all pertinent imaging results/findings within the past 24 hours.

## 2018-04-06 NOTE — PROGRESS NOTES
[facility]  Heart Transplant  Progress Note    Patient Name: Lv Crocker  MRN: [id number]  Admission Date: 3/11/2018  Hospital Length of Stay: 25 days  Attending Physician: Heriberto Rosa MD  Primary Care Provider: Philippe Mohr MD  Principal Problem:Acute respiratory failure with hypoxia    Subjective:     Interval History: s/p PEG and perma cath in OR. Feels good this AM. Trach collar today and will evaluate for passy skye valve as patient tries to communicate.    Continuous Infusions:    Scheduled Meds:   chlorhexidine  15 mL Mouth/Throat BID    escitalopram oxalate  20 mg Per G Tube Daily    famotidine  20 mg Per G Tube QHS    heparin (porcine)  5,000 Units Subcutaneous Q12H    [START ON 4/7/2018] insulin aspart U-100  1 Units Subcutaneous Q24H    [START ON 4/7/2018] insulin aspart U-100  1 Units Subcutaneous Q24H    [START ON 4/7/2018] insulin aspart U-100  1 Units Subcutaneous Q24H    insulin aspart U-100  1 Units Subcutaneous Q24H    insulin aspart U-100  1 Units Subcutaneous Q24H    insulin aspart U-100  1 Units Subcutaneous Q24H    insulin detemir U-100  3 Units Subcutaneous BID    levothyroxine  137 mcg Per G Tube Daily    oxyCODONE  10 mg Per G Tube TID    predniSONE  7.5 mg Per G Tube Daily    sodium chloride 0.9%  3 mL Intravenous Q8H    tacrolimus  2 mg Per G Tube Daily    tacrolimus  1 mg Per G Tube Daily     PRN Meds:sodium chloride, alprazolam ODT, dextrose 50%, glucagon (human recombinant), insulin aspart U-100, ondansetron, ondansetron, oxyCODONE, povidone-iodine    Review of patient's allergies indicates:   Allergen Reactions    No known drug allergies      Objective:     Vital Signs (Most Recent):  Temp: 98 °F (36.7 °C) (04/06/18 0730)  Pulse: (!) 119 (04/06/18 1310)  Resp: 10 (04/06/18 1310)  BP: (!) 100/59 (04/06/18 1100)  SpO2: 100 % (04/06/18 1310) Vital Signs (24h Range):  Temp:  [97.5 °F (36.4 °C)-98 °F (36.7 °C)] 98 °F (36.7 °C)  Pulse:   [110-124] 119  Resp:  [7-20] 10  SpO2:  [100 %] 100 %  BP: ()/(45-63) 100/59     Patient Vitals for the past 72 hrs (Last 3 readings):   Weight   04/05/18 1300 63.1 kg (139 lb 1.8 oz)   04/04/18 0300 63.1 kg (139 lb 1.8 oz)     Body mass index is 21.79 kg/m².      Intake/Output Summary (Last 24 hours) at 04/06/18 1408  Last data filed at 04/06/18 1000   Gross per 24 hour   Intake          2699.54 ml   Output             3747 ml   Net         -1047.46 ml     Hemodynamic Parameters:    Physical Exam   Constitutional: He appears well-developed and well-nourished. No distress.   HENT:   Head: Normocephalic and atraumatic.   Eyes: EOM are normal. No scleral icterus.   Neck: Normal range of motion.   Trach in place    Cardiovascular: Regular rhythm.    tachycardic   Pulmonary/Chest: Effort normal. No respiratory distress.   Perm cath in place no signs of infection   Abdominal:   Soft, NT, previous chest tube scars noted  No distension, no fluid wave  g tube in place with green drainage, wound C/D/I   Musculoskeletal: He exhibits no edema or tenderness.   Neurological: He is alert.   Skin: Skin is warm and dry.     Significant Labs:  CBC:    Recent Labs  Lab 04/05/18  0818 04/05/18 2029 04/06/18  0841   WBC 4.52 4.39 3.49*   RBC 2.63* 2.70* 2.51*   HGB 8.1* 8.4* 7.9*   HCT 25.6* 25.4* 23.8*   * 129* 109*   MCV 97 94 95   MCH 30.8 31.1* 31.5*   MCHC 31.6* 33.1 33.2     BNP:  No results for input(s): BNP in the last 168 hours.    Invalid input(s): BNPTRIAGELBLO  CMP:    Recent Labs  Lab 04/04/18  0326  04/05/18  0314 04/05/18  1409 04/05/18  1713 04/05/18  2138 04/06/18  0255   *  121*  < > 213* 325*  --  209* 147*  147*   CALCIUM 8.1*  8.1*  < > 8.4* 8.6*  --  8.5* 7.9*  7.9*   ALBUMIN 2.4*  2.4*  < > 2.3* 2.4*  --  2.3* 2.4*  2.4*   PROT 5.5*  --  5.5*  --   --   --  5.7*     138  < > 139 137  --  139 137  137   K 3.8  3.8  < > 4.2 4.4 4.2 4.0 3.7  3.7   CO2 24  24  < > 17* 15*  --   22* 26  26     101  < > 101 99  --  102 100  100   BUN 4*  4*  < > 27* 38*  --  38* 9  9   CREATININE 0.5  0.5  < > 1.7* 2.5*  --  2.3* 0.8  0.8   ALKPHOS 172*  --  162*  --   --   --  167*   ALT 17  --  14  --   --   --  7*   AST 27  --  26  --   --   --  20   BILITOT 1.1*  --  0.8  --   --   --  0.9   < > = values in this interval not displayed.   Coagulation:   No results for input(s): PT, INR, APTT in the last 168 hours.  LDH:  No results for input(s): LDH in the last 72 hours.  Microbiology:  Microbiology Results (last 7 days)     Procedure Component Value Units Date/Time    Fungus culture [[id number]] Collected:  03/28/18 1607    Order Status:  Completed Specimen:  Body Fluid from Lung, RLL Updated:  04/05/18 1118     Fungus (Mycology) Culture Culture in progress    Culture, Anaerobe [[id number]] Collected:  03/28/18 1607    Order Status:  Completed Specimen:  Body Fluid from Lung, RLL Updated:  04/02/18 1251     Anaerobic Culture No anaerobes isolated    Aerobic culture [[id number]] Collected:  03/28/18 1607    Order Status:  Completed Specimen:  Body Fluid from Lung, RLL Updated:  03/31/18 1209     Aerobic Bacterial Culture No growth    Blood culture [[id number]] Collected:  03/25/18 1554    Order Status:  Completed Specimen:  Blood from Peripheral, Upper Arm, Right Updated:  03/30/18 1812     Blood Culture, Routine No growth after 5 days.    Blood culture [[id number]] Collected:  03/25/18 1606    Order Status:  Completed Specimen:  Blood from Peripheral, Forearm, Left Updated:  03/30/18 1812     Blood Culture, Routine No growth after 5 days.          I have reviewed all pertinent labs within the past 24 hours.    Estimated Creatinine Clearance: 105.2 mL/min (based on SCr of 0.8 mg/dL).    Diagnostic Results:  I have reviewed and interpreted all pertinent imaging results/findings within the past 24 hours.    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 Most recently has had ongoing issues with his lungs, had gotten 2 thoracenteses, after which he underwent VATS 01/19/18 followed by pleurex catheter placement and effusion drainage 01/11/18, subsequently had it removed 02/07/18 after drainage had decreased despite multiple attempts to drain it. Has been having significant coughing fits that result in him being near-syncopal at times, although difficult to say if he has passed out or not- patient most recently was admitted to the hospital for increased AGUILAR, coughing and pre-syncope 02/11-02/14/18 at [facility].   Patient was started on antibiotics for suspected bacterial infection, with some diarrhea, bronchitis, and possible pneumonia. Ct chest showed some pleural fluid collection and after talking with Dr. James, we arranged for him to receive a diagnostic tap with IR, from which the fluid collection demonstrated no growth or significant findings at all really. Cell counts do not appear to have been sent but will recheck. Patient states since his hospital stay, yesterday had a significant coughing fit again, after which he nearly passed out, is being seen in clinic today for this. Denies any cardiopulmonary complaints, no leg swelling, has some abdominal swelling, which is moderate for him. Denies significant shortness of breath with mild exertion, had 6MWT yesterday to see if he qualified for home O2, and his O2 sats actually improved after recovery from where he started initially. He and his wife admit he is in bed most days, not very active.     Mr. Crocker presented to the ED 3/11/18 with approximately 3 weeks of worsening diarrhea and 2-3 days of sinus pressure, drainage, and worsened cough.  He notes that he had a coughing fit last night and passed out, coughed throughout most of the night.  This also  happened on 2/25/18.  He last saw Dr Ferreira in clinic on 2/20/18 where he was taken off myfortic and switched to cellcept (he hadn't been on cellcept because of leukopenia when they tried post-transplant).  Though his diarrhea had improved after his last discharge, he states it has increased now to 15x/day, clear/yellow/green, no fevers, no exacerbating foods per say.  He was taken back off the cellcept and placed back on myfortic this past week and has not noticed immediate change in diarrhea. He also reports his baseline coughing fits havent improved and are minimally responsive to tessalon pearls.  Came on last night, he lost consciousness during a fit once which is relatively normal for him, and had two more during HPI.  He notes no sick contacts but does state he's had some URI symptoms as above.  His baseline vitals are usually -120 and BP 90s/60s-110s/70s.  He reports a history of intermittent diarrhea - did have Cdiff many years ago but this smells different.  No abdominal pain, no nausea, no vomiting.  No blood in diarrhea.  Appears last c-scope in 2015 with no evidence of infection or inflammatory processes.  He does report IBS which is different that this.       * Acute respiratory failure with hypoxia    - S/P Bronch and intubation 3/14 now post tracheostomy due to inability to extubate   - Pulmonology following to help manage vent wean. Appreciate Recs   - RSV positive early in hospitalization. Completed course of Ribavarin/IVIG. Per lung transplant protocol for severe RSV (given myelosuppression).   - ID following. Completed 7 day course of Meropenem/Linezolid.   - All cultures remain negative.   - trach collar today, so far tolerating well  - consulted PT/OT/Wound care today        Restrictive cardiomyopathy    - Has known history of recurrent ascites and pleural effusions   - S/P thoracentesis X 2, followed by VATS/pleurex cath placement (January 2018) with removal of pleurex (February 2018) and  3rd thoracentesis 2/14/18 with no significant findings on fluid removal.        Type 1 diabetes mellitus with stage 3 chronic kidney disease    - Appreciate Endocrine's recs  - Insulin gtt per endocrine recs  - Recent blood transfusions will likely affect A1cs (will appear lower than they likely are)         Hypothyroidism due to Hashimoto's thyroiditis    - Appreciate Endocrine's recs  - Continue IV levothyroxine 88mcg        Acute renal failure superimposed on stage 4 chronic kidney disease    - Appreciate Nephrology recs.   - Intermittent CRRT.  - permacath placed in OR        Anemia    - transfused one unit 4/4        Heart transplanted    - TTE 3/26/18 showed normal graft function but has known history of restrictive CM post transplant.  - Continue pred 7.5  - Continue Tacro per G tube 1/2  - Cellcept remains on hold.        Debility    - PT/OT daily   - This will take time and require adequate nutrition  - PEG tube placed, tolerating tube feeds well        Anxiety    - Pt on nortriptyline/escitalopram at home. Held as no enteric feeding option at this point.   - Continue prn low dose xanax ODT.             JACOBY DeyC  Heart Transplant  [facility]

## 2018-04-06 NOTE — PT/OT/SLP EVAL
"Speech Language Pathology Evaluation  One Way Speaking Valve Evaluation    Patient Name:  Lv Crocker   MRN:  [id number]  Admitting Diagnosis: Acute respiratory failure with hypoxia    IMPORTANT  CAUTION: CUFF MUST ALWAYS BE DEFLATED WHEN VALVE IN USE    Recommendations:                 General Recommendations:  Cognitive-linguistic evaluation, One Way Speaking Valve and Speaking valve evaluation  Diet recommendations:  NPO, NPO   Aspiration Precautions: Continue alternate means of nutrition and Strict aspiration precautions   General Precautions: Standard, aspiration, fall, contact, NPO  Communication strategies:  yes/no questions only, provide increased time to answer and go to room if call light pushed    History:     Past Medical History:   Diagnosis Date    Arthritis     Ascites     Thought to be 2/2 heart tpx; liver bx 3/31/17 w/o significant fibrosis    Cardiomyopathy     Depression     Controlled "for the most part" on Lexipro    Encounter for blood transfusion     during transplant    GERD (gastroesophageal reflux disease)     Hashimoto's disease     History of CHF (congestive heart failure)     Previously EF 20% (prior to heart transplant); last EF 60%, PAP 41 as of 12/12/17; throught to be 2/2 genetics & DM1    History of coronary artery disease     H/o Coronary artery disease; resolved since heart transplant 2014    History of myocardial infarction     h/o MI x3 prior to heart transplant    HLD (hyperlipidemia) 6/11/2015    Hypoglycemia unawareness in type 1 diabetes mellitus     Hypothyroid     Initially hyperthyroid 2/2 Hoshimoto's thyroiditis; now on levothyroxine 150 mcg qd    Pleural effusion due to another disorder     Thought to be 2/2 heart tpx; s/p PleuRx catheter placement and removal after 1-2 months    Pulmonary hypertension assoc with unclear multi-factorial mechanisms 12/12/2017    PAP 41 (EF 60%) on ECHO 12/12/17    Syncope 6/30/2015    Type I diabetes mellitus, " well controlled     Well controlled; Dx'd @5y/o; on N insulin 20U BID & Humalog 10U w/meals +SSI; Glu 89 11/9/17; A1c 7.2% 4/5/17    Unspecified essential hypertension 05/29/2014    Well controlled; Last /57 on 11/9/17       Past Surgical History:   Procedure Laterality Date    CARDIAC CATHETERIZATION      CARDIAC SURGERY      CHOLECYSTECTOMY      COLONOSCOPY N/A 3/13/2018    Procedure: COLONOSCOPY;  Surgeon: Tim Spencer MD;  Location: [facility] ([address] [place] [address]);  Service: Endoscopy;  Laterality: N/A;    CORONARY ANGIOPLASTY      FOOT SPLIT TRANSFER OF THE POSTERIOR TIBIALIS TENDON PROCEDURE      HEART TRANSPLANT  2014    KNEE SURGERY      PleuRx catheter placement  01/11/2018    Plan for removal after 1-2 months by Dr. James in cardiothoracic surgery    s/p oht  November 2014    stent placemnet         Social History: Patient lives with his wife.    Prior Intubation HX:  Pt was intubated 3/14/18 and tracheostomy was completed 3/28/18.  He was PEG'd 4/4/18.    Modified Barium Swallow none this admission    Chest X-Rays: 4/5/18:  Left-sided central venous catheter in the SVC.  Tracheostomy tube identified.  Postoperative changes as before.  Heart size normal.  Opacification at the right lung base probably underlying pleural effusion and a combination of volume loss.  Suggestion of small amount of free air beneath the right hemidiaphragm versus a small amount of extrapulmonary air.  The left lung is clear.    Occupation/hobbies/homemaking: none communicated    Subjective     Pt was awake and alert attempting to mouth words to sLP  Patient goals:  None communicated     Objective:     Level of Alertness:  · Alert  · Cooperative  · Confused  · Perseverative    Secretion Management:  ·  Patient was able to handle secretions orally    Pain/Comfort:  · Pain Rating 1: 0/10  · Pain Rating Post-Intervention 1: 0/10    Barriers to Learning: Decreased attention to tasks and details, perseveration, poor self  monitoring and insight/awareness    Oral Musculature Evaluation  · Oral Musculature: general weakness  · Dentition: present and adequate  · Secretion Management: other (see comments) (wet, gurlgy vocal quality)  · Mucosal Quality: adequate  · Mandibular Strength and Mobility: WFL  · Oral Labial Strength and Mobility: WFL  · Lingual Strength and Mobility: impaired strength  · Volitional Cough: weak, non-productive  · Volitional Swallow: present, delayed    Trach/Vent:  · Vent support: No  · Tracheostomy Type  · Shiley  · Tracheostomy Size: 8.0  · Tolerates cuff deflated: Yes  · If not, were there changes in vitals signs or signs of discomfort: n/a    One Way Speaking Valve Placement:  Type of speaking valve: One Way Speaking Valve  Clear    Sp02 before trial: 100%  Sp02 during trial: 96%-99%  Time on valve: 1.08 sec first trial w/ pt coughing off the valve and 1.33 sec second trial w/ pt c/o difficulty breathing.  SLP removed valve.  Mild back pressure noted w/ valve removal.  Phonation on exhalation: limited, strained, breathy and strangled w/ severely decreased intensity and endurance.  Wet and gurgly vocal quality w/ first phonation attempted cleared w/ cued cough and swallows  Achieved phonation on 10% of words  Overall speech intelligibility: poor    Patient reaction: Flat Affect    Laryngeal function:    Excursion:  · delayed cued swallows w/ lingual and hyolaryngeal pumping.  Audible swallows    Voluntary Cough  · weak and nonproductive    Voluntary Throat Clear:   · unable to elicit    Voice Quality:  · Aphonic  · Hoarse  · Weak  · Unable to Sustain  · Vocal Intensity Severely Limited  · strained, strangeld, gurgly at onset.  Pt w/ multiple aphonic trials    Education provided: Education was provided to pt re: SLP role, PMSV use and benefits, and SLP POC.  He indicated fair understanding.  No family members were present for education    Assessment:     Lvlakshmi Herculescolm Lizzette is a 42 y.o. male with an SLP  diagnosis of Dysphonia, Aphonia, S/P Trach and One Way Speaking Valve Training.  He presents with severe vocal insufficiency at this time.    Goals:    SLP Goals        Problem: SLP Goal    Goal Priority Disciplines Outcome   SLP Goal     SLP Ongoing (interventions implemented as appropriate)   Description:  Speech Language Pathology Goals  Goals expected to be met by 4/13/18    1.  Pt will tolerate PMSV for 3 min w/ no change in respiratory status and fair voicing produced.  2.  Pt will complete further evaluation of cognitive-linguistic communication skills to determine the need for additional goals.                        Plan:     · Patient to be seen:  5 x/week   · Plan of Care expires:  05/05/18  · Plan of Care reviewed with:  patient   · SLP Follow-Up:  Yes       Discharge recommendations:  LTACH (long term acute care hospital)   Barriers to Discharge:  Level of Skilled Assistance Needed LTACH per team recommendations    Time Tracking:     SLP Treatment Date:   04/06/18  Speech Start Time:  1340  Speech Stop Time:  1410     Speech Total Time (min):  30 min    Billable Minutes: Evaluation Use/Fit Voiced Prosthetic 30    Jeanine Tabares CCC-SLP  04/06/2018

## 2018-04-06 NOTE — SUBJECTIVE & OBJECTIVE
Interval History:   Received SLED overnight. Tolerated well. Net negative 500cc yesterday. No events overnight. Working with PT/OT this morning. No complaints.     Review of patient's allergies indicates:   Allergen Reactions    No known drug allergies      Current Facility-Administered Medications   Medication Frequency    0.9%  NaCl infusion (for blood administration) Q24H PRN    alprazolam ODT dissolvable tablet 0.5 mg TID PRN    chlorhexidine 0.12 % solution 15 mL BID    dextrose 50% injection 12.5 g PRN    escitalopram oxalate tablet 20 mg Daily    famotidine tablet 20 mg QHS    glucagon (human recombinant) injection 1 mg PRN    heparin (porcine) injection 5,000 Units Q12H    insulin aspart U-100 pen 0-5 Units Q4H PRN    [START ON 4/7/2018] insulin aspart U-100 pen 1 Units Q24H    [START ON 4/7/2018] insulin aspart U-100 pen 1 Units Q24H    [START ON 4/7/2018] insulin aspart U-100 pen 1 Units Q24H    insulin aspart U-100 pen 1 Units Q24H    insulin aspart U-100 pen 1 Units Q24H    insulin aspart U-100 pen 1 Units Q24H    insulin detemir U-100 pen 3 Units BID    levothyroxine tablet 137 mcg Daily    ondansetron disintegrating tablet 4 mg Q6H PRN    ondansetron disintegrating tablet 8 mg Q6H PRN    oxyCODONE 5 mg/5 mL solution 10 mg TID    oxyCODONE 5 mg/5 mL solution 10 mg Q4H PRN    povidone-iodine 10 % ointment PRN    predniSONE tablet 7.5 mg Daily    sodium chloride 0.9% flush 3 mL Q8H    tacrolimus (PROGRAF) 1 mg/mL oral syringe Daily    tacrolimus (PROGRAF) 1 mg/mL oral syringe Daily       Objective:     Vital Signs (Most Recent):  Temp: 98 °F (36.7 °C) (04/06/18 0730)  Pulse: (!) 119 (04/06/18 1310)  Resp: 10 (04/06/18 1310)  BP: (!) 100/59 (04/06/18 1100)  SpO2: 100 % (04/06/18 1550)  O2 Device (Oxygen Therapy): Trach Collar (04/06/18 1550) Vital Signs (24h Range):  Temp:  [97.5 °F (36.4 °C)-98 °F (36.7 °C)] 98 °F (36.7 °C)  Pulse:  [110-124] 119  Resp:  [7-20] 10  SpO2:  [100  %] 100 %  BP: ()/(48-63) 100/59     Weight: 63.1 kg (139 lb 1.8 oz) (04/05/18 1300)  Body mass index is 21.79 kg/m².  Body surface area is 1.73 meters squared.    I/O last 3 completed shifts:  In: 2102.2 [I.V.:1812.2; NG/GT:290]  Out: 2706 [Drains:210; Other:2496]    Physical Exam   Constitutional: He appears well-developed and well-nourished. No distress.   HENT:   Head: Normocephalic and atraumatic.   Eyes: Conjunctivae and EOM are normal.   Cardiovascular: Regular rhythm.  Tachycardia present.    Pulmonary/Chest: He has no wheezes. He has no rales.   Abdominal: Soft. He exhibits no distension.   Musculoskeletal: He exhibits no tenderness or deformity.   Skin: Skin is warm and dry. He is not diaphoretic.       Significant Labs:  ABGs:   Recent Labs  Lab 04/06/18  0454   PH 7.389   PCO2 48.3*   HCO3 29.2*   POCSATURATED 66*   BE 4     CBC:   Recent Labs  Lab 04/06/18  0841   WBC 3.49*   RBC 2.51*   HGB 7.9*   HCT 23.8*   *   MCV 95   MCH 31.5*   MCHC 33.2     CMP:   Recent Labs  Lab 04/06/18  0255   *  147*   CALCIUM 7.9*  7.9*   ALBUMIN 2.4*  2.4*   PROT 5.7*     137   K 3.7  3.7   CO2 26  26     100   BUN 9  9   CREATININE 0.8  0.8   ALKPHOS 167*   ALT 7*   AST 20   BILITOT 0.9     All labs within the past 24 hours have been reviewed.     Significant Imaging:  Labs: Reviewed

## 2018-04-06 NOTE — SUBJECTIVE & OBJECTIVE
"Interval HPI:   Overnight events:  TF infusing per G tube  BG at goal on transition 0.4u/hr   On PDN 7.5 daily, HC being weaned off   Getting CRRT  Possible stepdown to floor today    BP (!) 111/56   Pulse (!) 117   Temp 98 °F (36.7 °C) (Oral)   Resp 15   Ht 5' 7" (1.702 m)   Wt 63.1 kg (139 lb 1.8 oz)   SpO2 100%   BMI 21.79 kg/m²     Labs Reviewed and Include      Recent Labs  Lab 04/06/18  0255   *  147*   CALCIUM 7.9*  7.9*   ALBUMIN 2.4*  2.4*   PROT 5.7*     137   K 3.7  3.7   CO2 26  26     100   BUN 9  9   CREATININE 0.8  0.8   ALKPHOS 167*   ALT 7*   AST 20   BILITOT 0.9     Lab Results   Component Value Date    WBC 3.49 (L) 04/06/2018    HGB 7.9 (L) 04/06/2018    HCT 23.8 (L) 04/06/2018    MCV 95 04/06/2018     (L) 04/06/2018     No results for input(s): TSH, FREET4 in the last 168 hours.  Lab Results   Component Value Date    HGBA1C 5.1 04/05/2018       Nutritional status:   Body mass index is 21.79 kg/m².  Lab Results   Component Value Date    ALBUMIN 2.4 (L) 04/06/2018    ALBUMIN 2.4 (L) 04/06/2018    ALBUMIN 2.3 (L) 04/05/2018     Lab Results   Component Value Date    PREALBUMIN 5 (L) 03/15/2018    PREALBUMIN 18 (L) 03/24/2015    PREALBUMIN 19 (L) 12/17/2014       Estimated Creatinine Clearance: 105.2 mL/min (based on SCr of 0.8 mg/dL).    Accu-Checks  Recent Labs      04/05/18   1557  04/05/18   1709  04/05/18   2030  04/05/18   2214  04/05/18   2359  04/06/18   0219  04/06/18   0407  04/06/18   0501  04/06/18   0737  04/06/18   0927   POCTGLUCOSE  314*  316*  248*  216*  150*  128*  146*  138*  116*  112*       Current Medications and/or Treatments Impacting Glycemic Control  Immunotherapy:  Immunosuppressants         Stop Route Frequency     tacrolimus (PROGRAF) 1 mg/mL oral syringe      -- PER G TUBE Daily     tacrolimus (PROGRAF) 1 mg/mL oral syringe      -- PER G TUBE Daily        Steroids:   Hormones     Start     Stop Route Frequency Ordered    04/06/18 " 0900  predniSONE tablet 7.5 mg      -- PER G TUBE Daily 04/05/18 0926        Pressors:    Autonomic Drugs     None        Hyperglycemia/Diabetes Medications: Antihyperglycemics     Start     Stop Route Frequency Ordered    04/07/18 0800  insulin aspart U-100 pen 1 Units      -- SubQ Every 24 hours (non-standard times) 04/06/18 1104    04/07/18 0400  insulin aspart U-100 pen 1 Units      -- SubQ Every 24 hours (non-standard times) 04/06/18 1104    04/07/18 0000  insulin aspart U-100 pen 1 Units      -- SubQ Every 24 hours (non-standard times) 04/06/18 1104    04/06/18 2000  insulin aspart U-100 pen 1 Units      -- SubQ Every 24 hours (non-standard times) 04/06/18 1104    04/06/18 1600  insulin aspart U-100 pen 1 Units      -- SubQ Every 24 hours (non-standard times) 04/06/18 1104    04/06/18 1200  insulin aspart U-100 pen 1 Units      -- SubQ Every 24 hours (non-standard times) 04/06/18 1104    04/06/18 1115  insulin detemir U-100 pen 3 Units      -- SubQ 2 times daily 04/06/18 1102    04/06/18 1103  insulin aspart U-100 pen 0-5 Units      -- SubQ Every 4 hours PRN 04/06/18 1104

## 2018-04-06 NOTE — ASSESSMENT & PLAN NOTE
- S/P Bronch and intubation 3/14 now post tracheostomy due to inability to extubate   - Pulmonology following to help manage vent wean. Appreciate Recs   - RSV positive early in hospitalization. Completed course of Ribavarin/IVIG. Per lung transplant protocol for severe RSV (given myelosuppression).   - ID following. Completed 7 day course of Meropenem/Linezolid.   - All cultures remain negative.   - trach collar today, so far tolerating well  - consulted PT/OT/Wound care today

## 2018-04-06 NOTE — ASSESSMENT & PLAN NOTE
BG goal 140-1180    Stop transition   Start levemir 3 units bid   Start novolog 1 units q4hr, low correction, q4h checks while on continous TF      Pt is T1DM, do not hold levemir  If BG trending down, will stop scheduled novolog     Discharge Recommendations:  TBD.

## 2018-04-06 NOTE — PT/OT/SLP PROGRESS
Occupational Therapy   Treatment    Name: Lv Crocker  MRN: [id number]  Admitting Diagnosis:  Acute respiratory failure with hypoxia  2 Days Post-Op    Recommendations:     Discharge Recommendations: LTACH (long term acute care hospital)  Discharge Equipment Recommendations:   (TBD at next level of care)  Barriers to discharge:  Decreased caregiver support, Inaccessible home environment (at current functional level)    Subjective     Communicated with: RN prior to session.  Pain/Comfort:  · Pain Rating 1: 5/10  · Location - Side 1: Right  · Location 1: knee  · Pain Addressed 1: Pre-medicate for activity, Distraction, Reposition  · Pain Rating Post-Intervention 1: 5/10    Patients cultural, spiritual, Scientologist conflicts given the current situation: none reported    Objective:     Patient found with: blood pressure cuff, pulse ox (continuous), telemetry, tracheostomy, central line, PEG Tube, oxygen (SC Marty/CRRT)    General Precautions: Standard, fall   Orthopedic Precautions:N/A   Braces:       Occupational Performance:    Bed Mobility:    · Patient completed Rolling/Turning to Left with  total assistance  · Patient completed Scooting/Bridging with total assistance  · Patient completed Supine to Sit with total assistance  · Patient completed Sit to Supine with total assistance     Functional Mobility/Transfers:  · NT    Activities of Daily Living:  · Grooming: total assistance seated EOB for wiping face    Patient left supine with all lines intact, call button in reach and rn notified    [facility] 6 Click:  [facility] Total Score: 6    Treatment & Education:  Pt ed on OT POC  Pt on CRRT without pressors; pt transitioned to trach collar from vent at beginning of session; SpO2 and RR within parameters throughout session  Pt sat EOB x 15 min with total A for balance, however pt able to hold up head ~75% of sitting time  Pt participated in B UE/LE AAROM ex's with gentle stretch at end ranges x 8-10 reps each  Pt ed  on importance of active ankle pumps for inreasing strength and passive movement by family members for ROM specifically to L foot  Education:    Assessment:     Lv Crocker is a 42 y.o. male with a medical diagnosis of Acute respiratory failure with hypoxia.  Performance deficits affecting function are weakness, impaired self care skills, impaired balance, decreased coordination, decreased safety awareness, decreased ROM, impaired skin, edema, impaired coordination, pain, decreased upper extremity function, impaired functional mobilty, impaired endurance, gait instability, decreased lower extremity function, impaired fine motor.      Rehab Prognosis:  Good; patient would benefit from acute skilled OT services to address these deficits and reach maximum level of function.       Plan:     Patient to be seen 5 x/week to address the above listed problems via self-care/home management, therapeutic activities, therapeutic exercises  · Plan of Care Expires: 04/23/18  · Plan of Care Reviewed with: patient    This Plan of care has been discussed with the patient who was involved in its development and understands and is in agreement with the identified goals and treatment plan    GOALS:    Occupational Therapy Goals        Problem: Occupational Therapy Goal    Goal Priority Disciplines Outcome Interventions   Occupational Therapy Goal     OT, PT/OT Ongoing (interventions implemented as appropriate)    Description:  Goals to be met by: 4/7/2018    Pt will follow 75% of motor commands with <2 verbal cues for repetition of task to maximize engagement in grooming task.  Pt will complete feeding with mod A.   Pt will complete supine with HOB slightly elevated to seated at EOB with mod A in prep for seated grooming task.  Pt will engage in BUE exercises in orders to increase strength to 3+/5 in BUE's to increase engagement in seated grooming task  Pt will sit at EOB for approx 10 minutes with mod A and unilateral UE  support to complete grooming task  Pt will complete sit>stand from EOB with bed in lowest position with mod A in prep for transfer to [facility]                    Time Tracking:     OT Date of Treatment: 04/06/18  OT Start Time: 0926  OT Stop Time: 1001  OT Total Time (min): 35 min    Billable Minutes:Therapeutic Exercise 25    AMADOU Murillo  4/6/2018

## 2018-04-06 NOTE — PROGRESS NOTES
[facility]  Nephrology  Progress Note    Patient Name: Lv Crocker  MRN: [id number]  Admission Date: 3/11/2018  Hospital Length of Stay: 25 days  Attending Provider: Heriberto Rosa MD   Primary Care Physician: Philippe Mohr MD  Principal Problem:Acute respiratory failure with hypoxia    Subjective:     HPI: Mr. Crocker is a 43 yo WM with T1DM, Hashimoto thyroiditis, h/o OHTx 11/2014, and CKD stage 4 who was admitted on 3/11/18 for persistent diarrhea. He reported a 3 week history of diarrhea up to 15x/day. Associated symptoms including URI symptoms, coughing fits, and coughing syncope. Prograf level was 14.3. His post-heart transplant course has been complicated by [facility] 4/2015, CMV, post-transplant restrictive cardiomyopathy, recurrent pleural effusions/ascites requiring monthly thoracenteses/paracenteses, VATS 1/11/18 with Pleur-X catheter (now removed), and CKD stage 4 with baseline sCr 2.6-3.0. Baseline -120s and baseline BP 90s-110s/60-70s. Upon admission he was started on IVF and diarrhea workup was initiated. On 3/12 he was noted to have decreased UOP despite fluids; NS was increased to 100cc/hr. He was scheduled for a colonoscopy on 3/13 however procedure was cancelled due to hypoxia and fever. He was transferred to the ICU for closer monitoring. He continued to have oliguria overnight. Bladder scan revealed 200cc of urine but patient refused osullivan catheter placement. Wife reports that patient always has a hard time urinating again after osullivan catheters are placed/removed so he refuses them. He remained hypoxic despite FiO2 70% and ABG revealed combined respiratory and metabolic acidosis with pH 7.17. He was started on BiPAP as well as a bicarb infusion at 50cc/hr. ABG with mild improvement. AM labs revealed K 6.2 and he was shifted and given kayexalate.Trialysis catheter was placed this morning and he subsequently developed hypotension and was started on levophed. He  was intubated later this morning. Nephrology consulted for CACHORRO. All history obtained by primary team and chart review as patient was intubated/sedated on exam. Consent for dialysis obtained by patient's wife and placed in chart. Of note, both of patient's parents were on dialysis.     Interval History:   Received SLED overnight. Tolerated well. Net negative 500cc yesterday. No events overnight. Working with PT/OT this morning. No complaints.     Review of patient's allergies indicates:   Allergen Reactions    No known drug allergies      Current Facility-Administered Medications   Medication Frequency    0.9%  NaCl infusion (for blood administration) Q24H PRN    alprazolam ODT dissolvable tablet 0.5 mg TID PRN    chlorhexidine 0.12 % solution 15 mL BID    dextrose 50% injection 12.5 g PRN    escitalopram oxalate tablet 20 mg Daily    famotidine tablet 20 mg QHS    glucagon (human recombinant) injection 1 mg PRN    heparin (porcine) injection 5,000 Units Q12H    insulin aspart U-100 pen 0-5 Units Q4H PRN    [START ON 4/7/2018] insulin aspart U-100 pen 1 Units Q24H    [START ON 4/7/2018] insulin aspart U-100 pen 1 Units Q24H    [START ON 4/7/2018] insulin aspart U-100 pen 1 Units Q24H    insulin aspart U-100 pen 1 Units Q24H    insulin aspart U-100 pen 1 Units Q24H    insulin aspart U-100 pen 1 Units Q24H    insulin detemir U-100 pen 3 Units BID    levothyroxine tablet 137 mcg Daily    ondansetron disintegrating tablet 4 mg Q6H PRN    ondansetron disintegrating tablet 8 mg Q6H PRN    oxyCODONE 5 mg/5 mL solution 10 mg TID    oxyCODONE 5 mg/5 mL solution 10 mg Q4H PRN    povidone-iodine 10 % ointment PRN    predniSONE tablet 7.5 mg Daily    sodium chloride 0.9% flush 3 mL Q8H    tacrolimus (PROGRAF) 1 mg/mL oral syringe Daily    tacrolimus (PROGRAF) 1 mg/mL oral syringe Daily       Objective:     Vital Signs (Most Recent):  Temp: 98 °F (36.7 °C) (04/06/18 0730)  Pulse: (!) 119 (04/06/18  1310)  Resp: 10 (04/06/18 1310)  BP: (!) 100/59 (04/06/18 1100)  SpO2: 100 % (04/06/18 1550)  O2 Device (Oxygen Therapy): Trach Collar (04/06/18 1550) Vital Signs (24h Range):  Temp:  [97.5 °F (36.4 °C)-98 °F (36.7 °C)] 98 °F (36.7 °C)  Pulse:  [110-124] 119  Resp:  [7-20] 10  SpO2:  [100 %] 100 %  BP: ()/(48-63) 100/59     Weight: 63.1 kg (139 lb 1.8 oz) (04/05/18 1300)  Body mass index is 21.79 kg/m².  Body surface area is 1.73 meters squared.    I/O last 3 completed shifts:  In: 2102.2 [I.V.:1812.2; NG/GT:290]  Out: 2706 [Drains:210; Other:2496]    Physical Exam   Constitutional: He appears well-developed and well-nourished. No distress.   HENT:   Head: Normocephalic and atraumatic.   Eyes: Conjunctivae and EOM are normal.   Cardiovascular: Regular rhythm.  Tachycardia present.    Pulmonary/Chest: He has no wheezes. He has no rales.   Abdominal: Soft. He exhibits no distension.   Musculoskeletal: He exhibits no tenderness or deformity.   Skin: Skin is warm and dry. He is not diaphoretic.       Significant Labs:  ABGs:   Recent Labs  Lab 04/06/18  0454   PH 7.389   PCO2 48.3*   HCO3 29.2*   POCSATURATED 66*   BE 4     CBC:   Recent Labs  Lab 04/06/18  0841   WBC 3.49*   RBC 2.51*   HGB 7.9*   HCT 23.8*   *   MCV 95   MCH 31.5*   MCHC 33.2     CMP:   Recent Labs  Lab 04/06/18  0255   *  147*   CALCIUM 7.9*  7.9*   ALBUMIN 2.4*  2.4*   PROT 5.7*     137   K 3.7  3.7   CO2 26  26     100   BUN 9  9   CREATININE 0.8  0.8   ALKPHOS 167*   ALT 7*   AST 20   BILITOT 0.9     All labs within the past 24 hours have been reviewed.     Significant Imaging:  Labs: Reviewed    Assessment/Plan:     Acute renal failure superimposed on stage 4 chronic kidney disease    - baseline sCr 2.6-3.0 consistent with CKD stage IV  - anuric CACHORRO; likely ischemic ATN from prolonged pre-renal state (diarrhea) in setting of prograf renal vasoconstriction; cannot r/o septic ATN or Prograf toxicity  - SLED  started 3/14. TDC placed 4/4/18.   - remains anuric  - currently performing nocturnal SLED x 12 hours with 3K/35HCO3 baths every other day  - received SLED last night; net negative 500cc yesterday  - no need for RRT today  - will reassess tomorrow for SLED vs HD based on hemodynamic status and availability             Amairani Alegria, PGY-5  Nephrology Fellow  [facility]  Pager: [phone]      I have reviewed and concur with the fellow's history, physical, assessment, and plan. I have personally interviewed and examined the patient at bedside.
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
## 2018-04-06 NOTE — ASSESSMENT & PLAN NOTE
- baseline sCr 2.6-3.0 consistent with CKD stage IV  - anuric CACHORRO; likely ischemic ATN from prolonged pre-renal state (diarrhea) in setting of prograf renal vasoconstriction; cannot r/o septic ATN or Prograf toxicity  - SLED started 3/14. TDC placed 4/4/18.   - remains anuric  - currently performing nocturnal SLED x 12 hours with 3K/35HCO2 baths every other day  - received SLED last night; net negative 500cc yesterday  - no need for RRT today  - will reassess tomorrow for SLED vs HD based on hemodynamic status and availability

## 2018-04-06 NOTE — PROGRESS NOTES
Wound care consulted for discolored toes/fingertips.    The patient has been on vasopressors, no longer in use.  There are discolored areas noted to the right toes, especially the right 3rd toe, proximal aspect, which is black, hard from the tip to the first metatarsal. The right 1st toe is discolored, soft, blanches and the 2nd toe has a ruptured blister noted.  The right thumb is discolored, warm, soft, blanches with blood blister noted near nail bed.  Plan of care discussed with patient who indicated understanding by nodding yes.    Recommendations:  Monitor toes, continue betadine daily.      04/06/18 1700       Wound 04/06/18 1700  proximal Toe, third   Date First Assessed/Time First Assessed: 04/06/18 1700   Pre-existing: No  Wound Type: (c)   Side: Right  Orientation: proximal  Location: Toe, third   Wound Image    Wound WDL ex   Dressing Appearance Open to air;No dressing   Drainage Amount None   Appearance Black;Necrotic;Dry;Blistered  (ruptured blister 2nd toe, discoloration 1st toe)   Black (%), Wound Tissue Color 100 %   Periwound Area Blistered;Moist   Care Povidone iodine  (applied per nurse)   Skin Interventions   Device Skin Pressure Protection pressure points protected;positioning supports utilized   Pressure Reduction Devices Pressure-redistributing mattress utilized;Heel offloading device utilized;Foam padding utilized   Pressure Reduction Techniques heels elevated off bed;positioned off wounds;pressure points protected;weight shift assistance provided     Right thumb/fingers

## 2018-04-06 NOTE — PROGRESS NOTES
UPDATE    SW confirmed [facility] in [place] cannot accept pt because they do not have the ability to do dialysis. Sw confirmed pt's wife is aware. Wife reports [facility] ph [phone], fax [phone] is next option. Sw faxed referral and confirmed with kang Ribeiro it has been received and is being processed. Pt's wife reports no other needs at this time. SW providing psychosocial and counseling support, education, resources, and d/c planning as needed. SW continuing to follow and remains available.

## 2018-04-06 NOTE — ASSESSMENT & PLAN NOTE
- PT/OT daily   - This will take time and require adequate nutrition  - PEG tube placed, tolerating tube feeds well

## 2018-04-06 NOTE — PT/OT/SLP PROGRESS
Physical Therapy Treatment    Patient Name:  Lv Crocker   MRN:  [id number]  Co-treat with OT  Recommendations:     Discharge Recommendations:  LTACH (long term acute care hospital)   Discharge Equipment Recommendations:  (TBD)   Barriers to discharge: Inaccessible home and Decreased caregiver support at current functional level     Assessment:     Lv Crocker is a 42 y.o. male admitted with a medical diagnosis of Acute respiratory failure with hypoxia.  He presents with the following impairments/functional limitations:  weakness, impaired endurance, impaired self care skills, impaired functional mobilty, impaired balance, decreased upper extremity function, decreased lower extremity function, pain, impaired cardiopulmonary response to activity. Pt progressing towards goals, but not at PLOF. Pt tolerated session well but with c/o feeling SOB. SpO2 remained WFL. Prior to beginning of session RT transitioned pt from trach to vent to tach collar.  Pt is improving with therapy evidenced by increased active ROM in B UE and B LE. Pt continues with poor sitting balance but is able to hold head closer to a neutral position for short bouts of time. Recommend d/c to LTAC to maximize functional independence.      Rehab Prognosis:  good; patient would benefit from acute skilled PT services to address these deficits and reach maximum level of function.      Recent Surgery: Procedure(s) (LRB):  INSERTION-CATHETER-PERM-A-CATH (Left)  INSERTION-TUBE-GASTROSTOMY (N/A) 2 Days Post-Op    Plan:     During this hospitalization, patient to be seen 5 x/week to address the above listed problems via gait training, therapeutic activities, therapeutic exercises, neuromuscular re-education  · Plan of Care Expires:  04/23/18   Plan of Care Reviewed with: patient    Subjective     Communicated with RN prior to session.  Patient found in bed upon PT entry to room, agreeable to treatment.      Chief Complaint: pain/SOB  Patient  comments/goals: to return home   Pain/Comfort:  · Pain Rating 1: 5/10 (R knee)  · Pain Addressed 1: Distraction, Pre-medicate for activity, Nurse notified  · Pain Rating Post-Intervention 1: 5/10    Patients cultural, spiritual, Presybeterian conflicts given the current situation: none reported     Objective:     Patient found with: telemetry, pulse ox (continuous), blood pressure cuff, tracheostomy, oxygen, peripheral IV, PEG Tube     General Precautions: Standard, fall   Orthopedic Precautions:N/A   Braces: N/A     Functional Mobility:  · Bed Mobility:     · Rolling Left:  total assistance  · Scooting: total assistance  · Supine to Sit: total assistance  · Sit to Supine: total assistance  · Transfers: not performed   · Gait: not performed       AM-PAC 6 CLICK MOBILITY  Turning over in bed (including adjusting bedclothes, sheets and blankets)?: 2  Sitting down on and standing up from a chair with arms (e.g., wheelchair, bedside commode, etc.): 1  Moving from lying on back to sitting on the side of the bed?: 2  Moving to and from a bed to a chair (including a wheelchair)?: 1  Need to walk in hospital room?: 1  Climbing 3-5 steps with a railing?: 1  Total Score: 8       Therapeutic Activities and Exercises:  Educated pt on PT POC  Educated pt on importance of OOB activity  Pt mouthed understanding    Sitting EOB x 15 minutes with total assistance to increase tolerance to OOB activity and to create optimal positioning for lung expansion     AAROM sitting EOB with B UE and B LE with OT x 8-10 reps  Hand squeezes  Wrist flexion/extension   Elbow flexion/extension  Knee flexion/extension  Marching     Patient left HOB elevated with all lines intact, call button in reach and RN notified..    GOALS:    Physical Therapy Goals        Problem: Physical Therapy Goal    Goal Priority Disciplines Outcome Goal Variances Interventions   Physical Therapy Goal     PT/OT, PT Ongoing (interventions implemented as appropriate)      Description:  Goals to be met by: 18     Patient will increase functional independence with mobility by performin. Supine to sit with Moderate Assistance - not met  2. Sit to supine with Moderate Assistance - not met  3. Rolling to Left and Right with Minimal Assistance. - not met  4. Sit to stand transfer with Moderate Assistance - not met  5. Bed to chair transfer with Maximum Assistance - not met  6. Gait  x 20 feet with Minimal Assistance. - not met                       Time Tracking:     PT Received On: 18  PT Start Time: 927     PT Stop Time: 958  PT Total Time (min): 31 min     Billable Minutes: Therapeutic Activity 23    Treatment Type: Treatment  PT/PTA: PT     PTA Visit Number: 0     Teresa Dudley PT, DPT  2018  [phone]

## 2018-04-07 NOTE — PROGRESS NOTES
[facility]  Heart Transplant  Progress Note    Patient Name: Lv Crocker  MRN: [id number]  Admission Date: 3/11/2018  Hospital Length of Stay: 26 days  Attending Physician: Heriberto Rosa MD  Primary Care Provider: Philippe Mohr MD  Principal Problem:Acute respiratory failure with hypoxia    Subjective:     Interval History: No complaints this am. Joking with RN.     Continuous Infusions:   insulin (HUMAN R) infusion (adults) 1 Units/hr (04/07/18 0600)     Scheduled Meds:   chlorhexidine  15 mL Mouth/Throat BID    escitalopram oxalate  20 mg Per G Tube Daily    famotidine  20 mg Per G Tube QHS    heparin (porcine)  5,000 Units Subcutaneous Q12H    levothyroxine  137 mcg Per G Tube Daily    oxyCODONE  10 mg Per G Tube TID    predniSONE  7.5 mg Per G Tube Daily    sodium chloride 0.9%  3 mL Intravenous Q8H    tacrolimus  2 mg Per G Tube Daily    tacrolimus  1 mg Per G Tube Daily     PRN Meds:sodium chloride, alprazolam ODT, dextrose 50%, dextrose 50%, dextrose 50%, glucagon (human recombinant), oxyCODONE, povidone-iodine    Review of patient's allergies indicates:   Allergen Reactions    No known drug allergies      Objective:     Vital Signs (Most Recent):  Temp: 98.7 °F (37.1 °C) (04/07/18 0300)  Pulse: (!) 120 (04/07/18 0730)  Resp: 16 (04/07/18 0730)  BP: (!) 80/54 (04/07/18 0600)  SpO2: 100 % (04/07/18 0730) Vital Signs (24h Range):  Temp:  [97.7 °F (36.5 °C)-98.7 °F (37.1 °C)] 98.7 °F (37.1 °C)  Pulse:  [111-127] 120  Resp:  [6-25] 16  SpO2:  [98 %-100 %] 100 %  BP: ()/(51-69) 80/54     Patient Vitals for the past 72 hrs (Last 3 readings):   Weight   04/05/18 1300 63.1 kg (139 lb 1.8 oz)     Body mass index is 21.79 kg/m².      Intake/Output Summary (Last 24 hours) at 04/07/18 0842  Last data filed at 04/07/18 0600   Gross per 24 hour   Intake          1723.69 ml   Output              876 ml   Net           847.69 ml     Hemodynamic Parameters:    Physical Exam    Constitutional: He appears well-developed and well-nourished. No distress.   HENT:   Head: Normocephalic and atraumatic.   Eyes: EOM are normal. No scleral icterus.   Neck: Normal range of motion.   Trach in place    Cardiovascular: Regular rhythm.    tachycardic   Pulmonary/Chest: Effort normal. No respiratory distress.   Perm cath in place no signs of infection   Abdominal:   Soft, NT, previous chest tube scars noted  No distension, no fluid wave  g tube in place with green drainage, wound C/D/I   Musculoskeletal: He exhibits no edema or tenderness.   Neurological: He is alert.   Skin: Skin is warm and dry.     Significant Labs:  CBC:    Recent Labs  Lab 04/05/18 2029 04/06/18  0841 04/06/18 2034   WBC 4.39 3.49* 4.13   RBC 2.70* 2.51* 2.60*   HGB 8.4* 7.9* 8.1*   HCT 25.4* 23.8* 24.2*   * 109* SEE COMMENT   MCV 94 95 93   MCH 31.1* 31.5* 31.2*   MCHC 33.1 33.2 33.5     BNP:  No results for input(s): BNP in the last 168 hours.    Invalid input(s): BNPTRIAGELBLO  CMP:    Recent Labs  Lab 04/06/18  0255 04/06/18  1515 04/06/18  2138 04/07/18  0305   *  147* 325*  325* 350* 129*  129*   CALCIUM 7.9*  7.9* 8.4*  8.4* 8.7 8.4*  8.4*   ALBUMIN 2.4*  2.4* 2.2*  2.2* 2.3* 2.0*  2.0*   PROT 5.7* 5.5*  --  5.1*     137 138  138 135* 139  139   K 3.7  3.7 3.9  3.9 4.1 3.8  3.8   CO2 26  26 25  25 27 30*  30*     100 99  99 98 102  102   BUN 9  9 14  14 22* 28*  28*   CREATININE 0.8  0.8 0.9  0.9 1.4 1.5*  1.5*   ALKPHOS 167* 162*  --  150*   ALT 7* 7*  --  5*   AST 20 20  --  17   BILITOT 0.9 0.8  --  0.7      Coagulation:   No results for input(s): PT, INR, APTT in the last 168 hours.  LDH:  No results for input(s): LDH in the last 72 hours.  Microbiology:  Microbiology Results (last 7 days)     Procedure Component Value Units Date/Time    Fungus culture [[id number]] Collected:  03/28/18 1607    Order Status:  Completed Specimen:  Body Fluid from Lung, RLL Updated:   04/05/18 1118     Fungus (Mycology) Culture Culture in progress    Culture, Anaerobe [[id number]] Collected:  03/28/18 1607    Order Status:  Completed Specimen:  Body Fluid from Lung, RLL Updated:  04/02/18 1251     Anaerobic Culture No anaerobes isolated    Aerobic culture [[id number]] Collected:  03/28/18 1607    Order Status:  Completed Specimen:  Body Fluid from Lung, RLL Updated:  03/31/18 1209     Aerobic Bacterial Culture No growth          I have reviewed all pertinent labs within the past 24 hours.    Estimated Creatinine Clearance: 56.1 mL/min (A) (based on SCr of 1.5 mg/dL (H)).    Diagnostic Results:  I have reviewed and interpreted all pertinent imaging results/findings within the past 24 hours.    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 Most recently has had ongoing issues with his lungs, had gotten 2 thoracenteses, after which he underwent VATS 01/19/18 followed by pleurex catheter placement and effusion drainage 01/11/18, subsequently had it removed 02/07/18 after drainage had decreased despite multiple attempts to drain it. Has been having significant coughing fits that result in him being near-syncopal at times, although difficult to say if he has passed out or not- patient most recently was admitted to the hospital for increased AGUILAR, coughing and pre-syncope 02/11-02/14/18 at [facility].   Patient was started on antibiotics for suspected bacterial infection, with some diarrhea, bronchitis, and possible pneumonia. Ct chest showed some pleural fluid collection and after talking with Dr. James, we arranged for him to receive a diagnostic tap with IR, from which the fluid collection demonstrated no growth or significant findings at all really. Cell counts do not appear to have been sent but will recheck.  Patient states since his hospital stay, yesterday had a significant coughing fit again, after which he nearly passed out, is being seen in clinic today for this. Denies any cardiopulmonary complaints, no leg swelling, has some abdominal swelling, which is moderate for him. Denies significant shortness of breath with mild exertion, had 6MWT yesterday to see if he qualified for home O2, and his O2 sats actually improved after recovery from where he started initially. He and his wife admit he is in bed most days, not very active.     Mr. Crocker presented to the ED 3/11/18 with approximately 3 weeks of worsening diarrhea and 2-3 days of sinus pressure, drainage, and worsened cough.  He notes that he had a coughing fit last night and passed out, coughed throughout most of the night.  This also happened on 2/25/18.  He last saw Dr Ferreira in clinic on 2/20/18 where he was taken off myfortic and switched to cellcept (he hadn't been on cellcept because of leukopenia when they tried post-transplant).  Though his diarrhea had improved after his last discharge, he states it has increased now to 15x/day, clear/yellow/green, no fevers, no exacerbating foods per say.  He was taken back off the cellcept and placed back on myfortic this past week and has not noticed immediate change in diarrhea. He also reports his baseline coughing fits havent improved and are minimally responsive to tessalon pearls.  Came on last night, he lost consciousness during a fit once which is relatively normal for him, and had two more during HPI.  He notes no sick contacts but does state he's had some URI symptoms as above.  His baseline vitals are usually -120 and BP 90s/60s-110s/70s.  He reports a history of intermittent diarrhea - did have Cdiff many years ago but this smells different.  No abdominal pain, no nausea, no vomiting.  No blood in diarrhea.  Appears last c-scope in 2015 with no evidence of infection or inflammatory processes.  He  does report IBS which is different that this.       * Acute respiratory failure with hypoxia    - S/P Bronch and intubation 3/14 now post tracheostomy due to inability to extubate   - Pulmonology following to help manage vent wean. Appreciate Recs   - RSV positive early in hospitalization. Completed course of Ribavarin/IVIG. Per lung transplant protocol for severe RSV (given myelosuppression).   - ID following. Completed 7 day course of Meropenem/Linezolid.   - All cultures remain negative.   - trach collar today, so far tolerating well  - Appreciate PT/OT/Wound care.        Acute renal failure superimposed on stage 4 chronic kidney disease    - Appreciate Nephrology recs.   - Intermittent CRRT.  - permacath placed in OR        Heart transplanted    - TTE 3/26/18 showed normal graft function but has known history of restrictive CM post transplant.  - Continue pred 7.5, Tacro per G tube 1/2  - Cellcept remains on hold.        Restrictive cardiomyopathy    - Has known history of recurrent ascites and pleural effusions   - S/P thoracentesis X 2, followed by VATS/pleurex cath placement (January 2018) with removal of pleurex (February 2018) and 3rd thoracentesis 2/14/18 with no significant findings on fluid removal.        Anemia    - transfused one unit 4/4        Type 1 diabetes mellitus with stage 3 chronic kidney disease    - Appreciate Endocrine's recs  - Insulin gtt per endocrine recs  - Recent blood transfusions will likely affect A1cs (will appear lower than they likely are)         Hypothyroidism due to Hashimoto's thyroiditis    - Appreciate Endocrine's recs  - Continue IV levothyroxine 88mcg        Debility    - PT/OT daily   - This will take time and require adequate nutrition  - PEG tube placed, tolerating tube feeds well        Anxiety    - Pt on nortriptyline/escitalopram at home. Held as no enteric feeding option at this point.   - Continue prn low dose xanax ODT.             Tim Mao, NETTIE  Heart  Transplant  [facility]

## 2018-04-07 NOTE — PROGRESS NOTES
Pt taken off of trach collar and placed back on mechanical ventilation for the night per MD orders. Pt tolerated well, will continue to monitor.

## 2018-04-07 NOTE — PROGRESS NOTES
[facility]  Nephrology  Progress Note    Patient Name: Lv Crocker  MRN: [id number]  Admission Date: 3/11/2018  Hospital Length of Stay: 26 days  Attending Provider: Heriberto Rosa MD   Primary Care Physician: Philippe Mohr MD  Principal Problem:Acute respiratory failure with hypoxia    Subjective:     HPI: Mr. Crocker is a 41 yo WM with T1DM, Hashimoto thyroiditis, h/o OHTx 11/2014, and CKD stage 4 who was admitted on 3/11/18 for persistent diarrhea. He reported a 3 week history of diarrhea up to 15x/day. Associated symptoms including URI symptoms, coughing fits, and coughing syncope. Prograf level was 14.3. His post-heart transplant course has been complicated by AMR 4/2015, CMV, post-transplant restrictive cardiomyopathy, recurrent pleural effusions/ascites requiring monthly thoracenteses/paracenteses, VATS 1/11/18 with Pleur-X catheter (now removed), and CKD stage 4 with baseline sCr 2.6-3.0. Baseline -120s and baseline BP 90s-110s/60-70s. Upon admission he was started on IVF and diarrhea workup was initiated. On 3/12 he was noted to have decreased UOP despite fluids; NS was increased to 100cc/hr. He was scheduled for a colonoscopy on 3/13 however procedure was cancelled due to hypoxia and fever. He was transferred to the ICU for closer monitoring. He continued to have oliguria overnight. Bladder scan revealed 200cc of urine but patient refused osullivan catheter placement. Wife reports that patient always has a hard time urinating again after osullivan catheters are placed/removed so he refuses them. He remained hypoxic despite FiO2 70% and ABG revealed combined respiratory and metabolic acidosis with pH 7.17. He was started on BiPAP as well as a bicarb infusion at 50cc/hr. ABG with mild improvement. AM labs revealed K 6.2 and he was shifted and given kayexalate.Trialysis catheter was placed this morning and he subsequently developed hypotension and was started on levophed. He  was intubated later this morning. Nephrology consulted for CACHORRO. All history obtained by primary team and chart review as patient was intubated/sedated on exam. Consent for dialysis obtained by patient's wife and placed in chart. Of note, both of patient's parents were on dialysis.     Interval History:   Patient evaluated at bedside, no significant event overnight. Total intake 1.9 L and output 1.1 L     Review of patient's allergies indicates:   Allergen Reactions    No known drug allergies      Current Facility-Administered Medications   Medication Frequency    0.9%  NaCl infusion (for blood administration) Q24H PRN    alprazolam ODT dissolvable tablet 0.5 mg TID PRN    chlorhexidine 0.12 % solution 15 mL BID    dextrose 50% injection 12.5 g PRN    dextrose 50% injection 12.5 g PRN    dextrose 50% injection 25 g PRN    escitalopram oxalate tablet 20 mg Daily    famotidine tablet 20 mg QHS    glucagon (human recombinant) injection 1 mg PRN    heparin (porcine) injection 5,000 Units Q12H    insulin regular (Humulin R) 100 Units in sodium chloride 0.9% 100 mL infusion Continuous    levothyroxine tablet 137 mcg Daily    oxyCODONE 5 mg/5 mL solution 10 mg TID    oxyCODONE 5 mg/5 mL solution 10 mg Q4H PRN    povidone-iodine 10 % ointment PRN    predniSONE tablet 7.5 mg Daily    sodium chloride 0.9% flush 3 mL Q8H    tacrolimus (PROGRAF) 1 mg/mL oral syringe Daily    tacrolimus (PROGRAF) 1 mg/mL oral syringe Daily       Objective:     Vital Signs (Most Recent):  Temp: 98.7 °F (37.1 °C) (04/07/18 0300)  Pulse: (!) 120 (04/07/18 0730)  Resp: 16 (04/07/18 0730)  BP: (!) 80/54 (04/07/18 0600)  SpO2: 100 % (04/07/18 0730)  O2 Device (Oxygen Therapy): Trach Collar (04/07/18 0730) Vital Signs (24h Range):  Temp:  [97.7 °F (36.5 °C)-98.7 °F (37.1 °C)] 98.7 °F (37.1 °C)  Pulse:  [111-127] 120  Resp:  [6-25] 16  SpO2:  [98 %-100 %] 100 %  BP: ()/(51-69) 80/54     Weight: 63.1 kg (139 lb 1.8 oz) (04/05/18  1300)  Body mass index is 21.79 kg/m².  Body surface area is 1.73 meters squared.    I/O last 3 completed shifts:  In: 4110.2 [I.V.:2690.2; NG/GT:1420]  Out: 3597 [Other:3597]    Physical Exam   Constitutional: He appears well-developed and well-nourished. No distress.   HENT:   Head: Normocephalic and atraumatic.   Eyes: Conjunctivae and EOM are normal.   Cardiovascular: Regular rhythm.  Tachycardia present.    Pulmonary/Chest: He has no wheezes. He has no rales.   Abdominal: Soft. He exhibits no distension.   Musculoskeletal: He exhibits edema. He exhibits no tenderness or deformity.   Neurological: He is alert.   Skin: Skin is warm and dry. He is not diaphoretic.       Significant Labs:  ABGs:   Recent Labs  Lab 04/07/18  0526   PH 7.478*   PCO2 42.2   HCO3 31.2*   POCSATURATED 72*   BE 8     BMP:   Recent Labs  Lab 04/07/18  0305   *  129*     102   CO2 30*  30*   BUN 28*  28*   CREATININE 1.5*  1.5*   CALCIUM 8.4*  8.4*   MG 1.9     CBC:   Recent Labs  Lab 04/06/18  2034   WBC 4.13   RBC 2.60*   HGB 8.1*   HCT 24.2*   PLT SEE COMMENT   MCV 93   MCH 31.2*   MCHC 33.5     CMP:   Recent Labs  Lab 04/07/18  0305   *  129*   CALCIUM 8.4*  8.4*   ALBUMIN 2.0*  2.0*   PROT 5.1*     139   K 3.8  3.8   CO2 30*  30*     102   BUN 28*  28*   CREATININE 1.5*  1.5*   ALKPHOS 150*   ALT 5*   AST 17   BILITOT 0.7     All labs within the past 24 hours have been reviewed.       Assessment/Plan:     Acute renal failure superimposed on stage 4 chronic kidney disease    - baseline sCr 2.6-3.0 consistent with CKD stage IV  - anuric CACHORRO; likely ischemic ATN from prolonged pre-renal state (diarrhea) in setting of prograf renal vasoconstriction; cannot r/o septic ATN or Prograf toxicity  - SLED started 3/14. TDC placed 4/4/18.   - remains anuric  - Blood pressure have been low side in the morning, was given a bolus of 250 cc yesterday   - Will hold on dialysis for today   - will reassess  tomorrow for SLED vs HD             Edson Soriano  Nephrology  Fellow  [facility]    Pager [phone]
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
## 2018-04-07 NOTE — ASSESSMENT & PLAN NOTE
- S/P Bronch and intubation 3/14 now post tracheostomy due to inability to extubate   - Pulmonology following to help manage vent wean. Appreciate Recs   - RSV positive early in hospitalization. Completed course of Ribavarin/IVIG. Per lung transplant protocol for severe RSV (given myelosuppression).   - ID following. Completed 7 day course of Meropenem/Linezolid.   - All cultures remain negative.   - trach collar today, so far tolerating well  - Appreciate PT/OT/Wound care.

## 2018-04-07 NOTE — ASSESSMENT & PLAN NOTE
- TTE 3/26/18 showed normal graft function but has known history of restrictive CM post transplant.  - Continue pred 7.5, Tacro per G tube 1/2  - Cellcept remains on hold.

## 2018-04-07 NOTE — ASSESSMENT & PLAN NOTE
-Ddx: metabolic, infectious, toxic, hepatic, adverse drug effect/polypharmacy, PRES, delirium  -Suspect ICU delirium given waxing/waning status with component of VISHAL--oxycodone  -Metabolic: Recent mild hypercarbia, CACHORRO on CKD--HD, recent labile BG.  -Infectious:  Recent cxs ngtd, no leukocytosis, pt afebrile.  Low suspicion for infection.  -Toxic/Hepatic:  Prolonged hospitalization, out of range for withdrawal/intox.  NH4 wnl at 18.  -Adverse drug effect:  Pt more somnolent, following fewer commands after oxycodone dose.  -PRES:  On tacrolimus, at risk for PRES--given clinical exam over past 2 d, do not suspect.  Hold off on MRI.   -Continue delirium precautions  -Try to wean off opioids as pt is able--consider lower scheduled dose with continued prns

## 2018-04-07 NOTE — ASSESSMENT & PLAN NOTE
- baseline sCr 2.6-3.0 consistent with CKD stage IV  - anuric CACHORRO; likely ischemic ATN from prolonged pre-renal state (diarrhea) in setting of prograf renal vasoconstriction; cannot r/o septic ATN or Prograf toxicity  - SLED started 3/14. TDC placed 4/4/18.   - remains anuric  - Blood pressure have been low side in the morning, was given a bolus of 250 cc yesterday   - Will hold on dialysis for today   - will reassess tomorrow for SLED vs HD

## 2018-04-07 NOTE — PROGRESS NOTES
"[facility]  Endocrinology  Progress Note    Admit Date: 3/11/2018     Reason for Consult: abnormal TFTs    Surgical Procedure and Date: s/p heart transplant 2014     Diabetes diagnosis year: 7yo (T1DM)     Home Diabetes Medications:    Levemir 15u qHS  Novolog 4u AC     How often checking glucose at home? 4 times daily   BG readings on regimen: 150-200s  Hypoglycemia on the regimen?  Yes, rarely - treats appropriately (light snack, glucose tab)  Missed doses on regimen?  No        Diabetes Complications include:     Hyperglycemia, Hypoglycemia  and Diabetic chronic kidney disease          Complicating diabetes co morbidities:   N/A     HPI:   Patient is a 42 y.o. male with a diagnosis of T1DM, HTN, hypothyroidism, s/p heart transplant.  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 He required serial paracentesis and thoracentesis until he underwent a VATS with pleurX catheter placement on 1/11/2018 and removed 2/7/18.       Presented to hospital secondary to diarrhea and cough.  Upon initial labs, TSH was decreased (0.101) and free T4 1.33.  Endocrinology consulted to assist in management of these labs.  He was last seen in clinic by Kamala Vázquez NP 11/2017.   Previous hospitalization, endocrine consulted for same problem.  During that visit, recommended decreasing LT4 to 125mcg daily. Unfortunately, patient was discharged on previous dose of 150mcg daily.     Interval HPI:   Overnight events:  Vent at night, trach at day, neuro consulted  CRRT  Off HC, now on PDN 7.5mg daily   TF per G tube  Was on stable dose insulin 1.6u/hr all night, stepped down to 1u/hr this am    BP (!) 80/54   Pulse (!) 120   Temp 98.7 °F (37.1 °C) (Axillary)   Resp 16   Ht 5' 7" (1.702 m)   Wt 63.1 kg (139 lb 1.8 oz)   SpO2 100%   BMI 21.79 kg/m²       Labs Reviewed and Include      Recent Labs  Lab " 04/07/18  0305   *  129*   CALCIUM 8.4*  8.4*   ALBUMIN 2.0*  2.0*   PROT 5.1*     139   K 3.8  3.8   CO2 30*  30*     102   BUN 28*  28*   CREATININE 1.5*  1.5*   ALKPHOS 150*   ALT 5*   AST 17   BILITOT 0.7     Lab Results   Component Value Date    WBC 4.13 04/06/2018    HGB 8.1 (L) 04/06/2018    HCT 24.2 (L) 04/06/2018    MCV 93 04/06/2018    PLT SEE COMMENT 04/06/2018     No results for input(s): TSH, FREET4 in the last 168 hours.  Lab Results   Component Value Date    HGBA1C 5.1 04/05/2018       Nutritional status:   Body mass index is 21.79 kg/m².  Lab Results   Component Value Date    ALBUMIN 2.0 (L) 04/07/2018    ALBUMIN 2.0 (L) 04/07/2018    ALBUMIN 2.3 (L) 04/06/2018     Lab Results   Component Value Date    PREALBUMIN 5 (L) 03/15/2018    PREALBUMIN 18 (L) 03/24/2015    PREALBUMIN 19 (L) 12/17/2014       Estimated Creatinine Clearance: 56.1 mL/min (A) (based on SCr of 1.5 mg/dL (H)).    Accu-Checks  Recent Labs      04/07/18   0002  04/07/18   0123  04/07/18   0207  04/07/18   0315  04/07/18   0407  04/07/18   0522  04/07/18   0612  04/07/18   0750  04/07/18   0836  04/07/18   0946   POCTGLUCOSE  267*  220*  178*  144*  156*  128*  139*  158*  167*  204*       Current Medications and/or Treatments Impacting Glycemic Control  Immunotherapy:  Immunosuppressants         Stop Route Frequency     tacrolimus (PROGRAF) 1 mg/mL oral syringe      -- PER G TUBE Daily     tacrolimus (PROGRAF) 1 mg/mL oral syringe      -- PER G TUBE Daily        Steroids:   Hormones     Start     Stop Route Frequency Ordered    04/06/18 0900  predniSONE tablet 7.5 mg      -- PER G TUBE Daily 04/05/18 0926        Pressors:    Autonomic Drugs     None        Hyperglycemia/Diabetes Medications: Antihyperglycemics     Start     Stop Route Frequency Ordered    04/07/18 1116  insulin aspart U-100 pen 0-4 Units      -- SubQ As needed (PRN) 04/07/18 1017    04/07/18 1015  insulin regular (Humulin R) 100 Units in  sodium chloride 0.9% 100 mL infusion      -- IV Continuous 04/07/18 1017          ASSESSMENT and PLAN    Type 1 diabetes mellitus with stage 3 chronic kidney disease    BG goal [id number]    Stop intensive   Change to transition at 1u/hr, low correction q4hr while on TF    Pt is T1DM, do not suspend insulin >1 hr   If TF held, decrease insulin rate to 0.2u/hr - known to have controlled BG on basal needs of lev 5 daily/ 0.2u/hr during this hospitalization     Discharge Recommendations:  TBD.          On enteral nutrition     May increase insulin needs                Hypothyroidism due to Hashimoto's thyroiditis     Continue Lt4 137mcg per G tube    Ideally should hold TF 1 hr before and 1 hr after given LT4       Recheck TFTs in 4 weeks (around 5/6)                    Current use of steroid medication     Agree with PDN 7.5mg daily                         Acute renal failure superimposed on stage 4 chronic kidney disease     Avoid insulin stacking             Heart transplanted     Per transplant  Avoid hypoglycemia         Palmira Dorsey MD  Endocrinology  [facility]

## 2018-04-07 NOTE — PLAN OF CARE
Problem: Patient Care Overview  Goal: Plan of Care Review  Outcome: Ongoing (interventions implemented as appropriate)  Pt did well on trach collar today. CRRT stopped. VSS. Pt was confused at times mouthing some nonsensical things. JOSE DANIEL Ryan notified and after bedside evaluation neurology consult ordered. Pt tolerated goal rate of tube feeds today. See flowsheets for further details.

## 2018-04-07 NOTE — ASSESSMENT & PLAN NOTE
BG goal [id number]    Stop intensive   Change to transition at 1u/hr, low correction q4hr while on TF    Pt is T1DM, do not suspend insulin >1 hr   If TF held, decrease insulin rate to 0.2u/hr - known to have controlled BG on basal needs of lev 5 daily/ 0.2u/hr during this hospitalization     Discharge Recommendations:  TBD.

## 2018-04-07 NOTE — SUBJECTIVE & OBJECTIVE
"Past Medical History:   Diagnosis Date    Arthritis     Ascites     Thought to be 2/2 heart tpx; liver bx 3/31/17 w/o significant fibrosis    Cardiomyopathy     Depression     Controlled "for the most part" on Lexipro    Encounter for blood transfusion     during transplant    GERD (gastroesophageal reflux disease)     Hashimoto's disease     History of CHF (congestive heart failure)     Previously EF 20% (prior to heart transplant); last EF 60%, PAP 41 as of 12/12/17; throught to be 2/2 genetics & DM1    History of coronary artery disease     H/o Coronary artery disease; resolved since heart transplant 2014    History of myocardial infarction     h/o MI x3 prior to heart transplant    HLD (hyperlipidemia) 6/11/2015    Hypoglycemia unawareness in type 1 diabetes mellitus     Hypothyroid     Initially hyperthyroid 2/2 Hoshimoto's thyroiditis; now on levothyroxine 150 mcg qd    Pleural effusion due to another disorder     Thought to be 2/2 heart tpx; s/p PleuRx catheter placement and removal after 1-2 months    Pulmonary hypertension assoc with unclear multi-factorial mechanisms 12/12/2017    PAP 41 (EF 60%) on ECHO 12/12/17    Syncope 6/30/2015    Type I diabetes mellitus, well controlled     Well controlled; Dx'd @7y/o; on N insulin 20U BID & Humalog 10U w/meals +SSI; Glu 89 11/9/17; A1c 7.2% 4/5/17    Unspecified essential hypertension 05/29/2014    Well controlled; Last /57 on 11/9/17     Past Surgical History:   Procedure Laterality Date    CARDIAC CATHETERIZATION      CARDIAC SURGERY      CHOLECYSTECTOMY      COLONOSCOPY N/A 3/13/2018    Procedure: COLONOSCOPY;  Surgeon: Tim Spencer MD;  Location: [facility] ([address] [place] [address]);  Service: Endoscopy;  Laterality: N/A;    CORONARY ANGIOPLASTY      FOOT SPLIT TRANSFER OF THE POSTERIOR TIBIALIS TENDON PROCEDURE      HEART TRANSPLANT  2014    KNEE SURGERY      PleuRx catheter placement  01/11/2018    Plan for removal after 1-2 months by " Dr. James in cardiothoracic surgery    s/p oht  November 2014    stent placemnet       Review of patient's allergies indicates:   Allergen Reactions    No known drug allergies      Current Neurological Medications: ASA, escitalopram, gabapentin    No current facility-administered medications on file prior to encounter.      Current Outpatient Prescriptions on File Prior to Encounter   Medication Sig    aspirin (ECOTRIN) 81 MG EC tablet Take 1 tablet (81 mg total) by mouth once daily.    escitalopram oxalate (LEXAPRO) 20 MG tablet Take 1 tablet (20 mg total) by mouth once daily.    gabapentin (NEURONTIN) 300 MG capsule Take 3 capsules (900 mg total) by mouth 3 (three) times daily.    insulin aspart (NOVOLOG) 100 unit/mL InPn pen Inject 4 Units into the skin 3 (three) times daily.    insulin detemir (LEVEMIR FLEXTOUCH) 100 unit/mL (3 mL) SubQ InPn pen Inject 15 Units into the skin every evening. (Patient taking differently: Inject 15 Units into the skin 2 (two) times daily. )    lancets Misc 1 Device by Misc.(Non-Drug; Combo Route) route 4 (four) times daily with meals and nightly.    levothyroxine (SYNTHROID) 150 MCG tablet Take 1 tablet (150 mcg total) by mouth every morning.    magnesium oxide (MAG-OX) 400 mg tablet Take 1 tablet (400 mg total) by mouth once daily.    mycophenolate (MYFORTIC) 360 MG TbEC Take 2 tablets (720 mg total) by mouth 2 (two) times daily.    nortriptyline (PAMELOR) 25 MG capsule Take 1 capsule (25 mg total) by mouth every evening.    ondansetron (ZOFRAN-ODT) 4 MG TbDL Take 2 tablets (8 mg total) by mouth every 8 (eight) hours as needed (nausea).    pantoprazole (PROTONIX) 40 MG tablet TAKE ONE TABLET BY MOUTH EVERY DAY    pravastatin (PRAVACHOL) 40 MG tablet Take 1 tablet (40 mg total) by mouth every evening. (Patient taking differently: Take 40 mg by mouth once daily. )    predniSONE (DELTASONE) 2.5 MG tablet Take 1 tablet (2.5 mg total) by mouth once daily. S/P Heart  "Transplant 11/18/2014 ICD-10 Z94.1    tacrolimus (PROGRAF) 1 MG Cap Taked 3mg orally in the morning and 3mg orally in the evening. S/p heart transplant  11/18/2014  ICD-10 Z94.1    tamsulosin (FLOMAX) 0.4 mg Cp24 TAKE 2 CAPSULES(0.8 MG) BY MOUTH EVERY EVENING    torsemide (DEMADEX) 20 MG Tab Take 2 tablets (40 mg total) by mouth once daily.    pen needle, diabetic 32 gauge x 5/32" Ndle Uses 5 pen needles a day     Family History     Problem Relation (Age of Onset)    Cancer Brother (50)    Diabetes Mother, Father, Brother, Son, Sister, Maternal Uncle, Paternal Aunt, Maternal Grandmother, Maternal Grandfather    Heart disease Mother, Brother    Hypertension Mother, Father, Brother    Kidney disease Mother, Father    Stroke Father, Brother    Vision loss Brother        Social History Main Topics    Smoking status: Never Smoker    Smokeless tobacco: Never Used    Alcohol use No    Drug use: No    Sexual activity: Yes     Partners: Female     Review of Systems   Constitutional: Negative for chills and fever.   Eyes: Negative for visual disturbance.   Respiratory: Negative for cough.         Trach placed   Skin: Positive for wound (ischemic R fingers/toes). Negative for rash.   Neurological: Positive for speech difficulty and weakness.   Hematological: Negative for adenopathy. Does not bruise/bleed easily.   Psychiatric/Behavioral: Positive for confusion and decreased concentration. Negative for agitation.     Objective:     Vital Signs (Most Recent):  Temp: 97.8 °F (36.6 °C) (04/07/18 1100)  Pulse: (!) 116 (04/07/18 1100)  Resp: 15 (04/07/18 1100)  BP: 104/61 (04/07/18 1100)  SpO2: 100 % (04/07/18 1114) Vital Signs (24h Range):  Temp:  [97.8 °F (36.6 °C)-98.7 °F (37.1 °C)] 97.8 °F (36.6 °C)  Pulse:  [114-127] 116  Resp:  [6-25] 15  SpO2:  [99 %-100 %] 100 %  BP: ()/(51-69) 104/61     Weight: 63.1 kg (139 lb 1.8 oz)  Body mass index is 21.79 kg/m².    Physical Exam  General:  Well-developed, " well-nourished, nad  HEENT:  NCAT, PERRLA, EOMI, oropharyngeal membranes non-erythematous/without exudate  Neck:  Supple, normal ROM without nuchal rigidity, trach placed midline w/o exudate  Resp:  Symmetric expansion, no increased wob--has O2 mask over trach  CVS:  No LE edema, peripheral pulses 2+ (radial, dorsalis pedis)  GI:  Abd soft, non-distended, non-tender to palpation  Skin:  Necrotic tissue RUE digits, RLE digits with betadine applied  Neurologic Exam:  Mental Status: Somnolent but wakes to voice, oriented to self, location, somewhat to date.  He is able to answer simple questions by mouthing words, can be heard slightly despite trach.  Follows simple commands consistently.  Cranial Nerves:  VFs intact on blink to threat.  PERRLA, EOMI.  Facial movement intact and symmetric.  Palate elevates symmetrically, tongue protrudes midline.  Bilateral trapezius 4+/5.  Motor: Normal bulk and tone.  Patient with poor effort at proximal extremities.  2/5 BUE shoulder abduction, 4-/5 biceps/triceps, 4/5  strength.  2/5 BLE hip flexors, 3/5 knee flexion/extension, 4-/5 plantarflexion/dorsiflexion.  Sensory:  Patient nods to light touch at all extremities.  Nods that BUE/BLE vibratory sensation is intact at digits.  Reflexes:  Biceps, brachioradialis 2+ and symmetric.  Patellar 1+ bilaterally.  No ankle clonus.  Coordination:  FNF, HTS limited by weakness.  GILMAR intact without dysdiadochokinesia.  Gait:  Deferred 2/2 weakness with fall precautions.     Significant Labs:     Recent Labs  Lab 18  1120   WBC 3.88*   RBC 2.55*   HGB 8.0*   HCT 24.7*   *   MCV 97   MCH 31.4*   MCHC 32.4       Recent Labs  Lab 18  0305   CALCIUM 8.4*  8.4*   PROT 5.1*     139   K 3.8  3.8   CO2 30*  30*     102   BUN 28*  28*   CREATININE 1.5*  1.5*   ALKPHOS 150*   ALT 5*   AST 17   BILITOT 0.7     Significant Imagin18 CT head w/o contrast:  1. No acute intracranial abnormality.  2.  Paranasal sinuses and right mastoid disease, as above.

## 2018-04-07 NOTE — SUBJECTIVE & OBJECTIVE
Interval History: No complaints this am. Joking with RN.     Continuous Infusions:   insulin (HUMAN R) infusion (adults) 1 Units/hr (04/07/18 0600)     Scheduled Meds:   chlorhexidine  15 mL Mouth/Throat BID    escitalopram oxalate  20 mg Per G Tube Daily    famotidine  20 mg Per G Tube QHS    heparin (porcine)  5,000 Units Subcutaneous Q12H    levothyroxine  137 mcg Per G Tube Daily    oxyCODONE  10 mg Per G Tube TID    predniSONE  7.5 mg Per G Tube Daily    sodium chloride 0.9%  3 mL Intravenous Q8H    tacrolimus  2 mg Per G Tube Daily    tacrolimus  1 mg Per G Tube Daily     PRN Meds:sodium chloride, alprazolam ODT, dextrose 50%, dextrose 50%, dextrose 50%, glucagon (human recombinant), oxyCODONE, povidone-iodine    Review of patient's allergies indicates:   Allergen Reactions    No known drug allergies      Objective:     Vital Signs (Most Recent):  Temp: 98.7 °F (37.1 °C) (04/07/18 0300)  Pulse: (!) 120 (04/07/18 0730)  Resp: 16 (04/07/18 0730)  BP: (!) 80/54 (04/07/18 0600)  SpO2: 100 % (04/07/18 0730) Vital Signs (24h Range):  Temp:  [97.7 °F (36.5 °C)-98.7 °F (37.1 °C)] 98.7 °F (37.1 °C)  Pulse:  [111-127] 120  Resp:  [6-25] 16  SpO2:  [98 %-100 %] 100 %  BP: ()/(51-69) 80/54     Patient Vitals for the past 72 hrs (Last 3 readings):   Weight   04/05/18 1300 63.1 kg (139 lb 1.8 oz)     Body mass index is 21.79 kg/m².      Intake/Output Summary (Last 24 hours) at 04/07/18 0842  Last data filed at 04/07/18 0600   Gross per 24 hour   Intake          1723.69 ml   Output              876 ml   Net           847.69 ml     Hemodynamic Parameters:    Physical Exam   Constitutional: He appears well-developed and well-nourished. No distress.   HENT:   Head: Normocephalic and atraumatic.   Eyes: EOM are normal. No scleral icterus.   Neck: Normal range of motion.   Trach in place    Cardiovascular: Regular rhythm.    tachycardic   Pulmonary/Chest: Effort normal. No respiratory distress.   Perm cath in  place no signs of infection   Abdominal:   Soft, NT, previous chest tube scars noted  No distension, no fluid wave  g tube in place with green drainage, wound C/D/I   Musculoskeletal: He exhibits no edema or tenderness.   Neurological: He is alert.   Skin: Skin is warm and dry.     Significant Labs:  CBC:    Recent Labs  Lab 04/05/18 2029 04/06/18  0841 04/06/18 2034   WBC 4.39 3.49* 4.13   RBC 2.70* 2.51* 2.60*   HGB 8.4* 7.9* 8.1*   HCT 25.4* 23.8* 24.2*   * 109* SEE COMMENT   MCV 94 95 93   MCH 31.1* 31.5* 31.2*   MCHC 33.1 33.2 33.5     BNP:  No results for input(s): BNP in the last 168 hours.    Invalid input(s): BNPTRIAGELBLO  CMP:    Recent Labs  Lab 04/06/18  0255 04/06/18  1515 04/06/18  2138 04/07/18  0305   *  147* 325*  325* 350* 129*  129*   CALCIUM 7.9*  7.9* 8.4*  8.4* 8.7 8.4*  8.4*   ALBUMIN 2.4*  2.4* 2.2*  2.2* 2.3* 2.0*  2.0*   PROT 5.7* 5.5*  --  5.1*     137 138  138 135* 139  139   K 3.7  3.7 3.9  3.9 4.1 3.8  3.8   CO2 26  26 25  25 27 30*  30*     100 99  99 98 102  102   BUN 9  9 14  14 22* 28*  28*   CREATININE 0.8  0.8 0.9  0.9 1.4 1.5*  1.5*   ALKPHOS 167* 162*  --  150*   ALT 7* 7*  --  5*   AST 20 20  --  17   BILITOT 0.9 0.8  --  0.7      Coagulation:   No results for input(s): PT, INR, APTT in the last 168 hours.  LDH:  No results for input(s): LDH in the last 72 hours.  Microbiology:  Microbiology Results (last 7 days)     Procedure Component Value Units Date/Time    Fungus culture [[id number]] Collected:  03/28/18 1607    Order Status:  Completed Specimen:  Body Fluid from Lung, RLL Updated:  04/05/18 1118     Fungus (Mycology) Culture Culture in progress    Culture, Anaerobe [[id number]] Collected:  03/28/18 1607    Order Status:  Completed Specimen:  Body Fluid from Lung, RLL Updated:  04/02/18 1251     Anaerobic Culture No anaerobes isolated    Aerobic culture [[id number]] Collected:  03/28/18 1607    Order Status:  Completed  Specimen:  Body Fluid from Lung, RLL Updated:  03/31/18 1209     Aerobic Bacterial Culture No growth          I have reviewed all pertinent labs within the past 24 hours.    Estimated Creatinine Clearance: 56.1 mL/min (A) (based on SCr of 1.5 mg/dL (H)).    Diagnostic Results:  I have reviewed and interpreted all pertinent imaging results/findings within the past 24 hours.

## 2018-04-07 NOTE — SUBJECTIVE & OBJECTIVE
"Interval HPI:   Overnight events:  Vent at night, trach at day, neuro consulted  CRRT  Off HC, now on PDN 7.5mg daily   TF per G tube  Was on stable dose insulin 1.6u/hr all night, stepped down to 1u/hr this am    BP (!) 80/54   Pulse (!) 120   Temp 98.7 °F (37.1 °C) (Axillary)   Resp 16   Ht 5' 7" (1.702 m)   Wt 63.1 kg (139 lb 1.8 oz)   SpO2 100%   BMI 21.79 kg/m²     Labs Reviewed and Include      Recent Labs  Lab 04/07/18  0305   *  129*   CALCIUM 8.4*  8.4*   ALBUMIN 2.0*  2.0*   PROT 5.1*     139   K 3.8  3.8   CO2 30*  30*     102   BUN 28*  28*   CREATININE 1.5*  1.5*   ALKPHOS 150*   ALT 5*   AST 17   BILITOT 0.7     Lab Results   Component Value Date    WBC 4.13 04/06/2018    HGB 8.1 (L) 04/06/2018    HCT 24.2 (L) 04/06/2018    MCV 93 04/06/2018    PLT SEE COMMENT 04/06/2018     No results for input(s): TSH, FREET4 in the last 168 hours.  Lab Results   Component Value Date    HGBA1C 5.1 04/05/2018       Nutritional status:   Body mass index is 21.79 kg/m².  Lab Results   Component Value Date    ALBUMIN 2.0 (L) 04/07/2018    ALBUMIN 2.0 (L) 04/07/2018    ALBUMIN 2.3 (L) 04/06/2018     Lab Results   Component Value Date    PREALBUMIN 5 (L) 03/15/2018    PREALBUMIN 18 (L) 03/24/2015    PREALBUMIN 19 (L) 12/17/2014       Estimated Creatinine Clearance: 56.1 mL/min (A) (based on SCr of 1.5 mg/dL (H)).    Accu-Checks  Recent Labs      04/07/18   0002  04/07/18   0123  04/07/18   0207  04/07/18   0315  04/07/18   0407  04/07/18   0522  04/07/18   0612  04/07/18   0750  04/07/18   0836  04/07/18   0946   POCTGLUCOSE  267*  220*  178*  144*  156*  128*  139*  158*  167*  204*       Current Medications and/or Treatments Impacting Glycemic Control  Immunotherapy:  Immunosuppressants         Stop Route Frequency     tacrolimus (PROGRAF) 1 mg/mL oral syringe      -- PER G TUBE Daily     tacrolimus (PROGRAF) 1 mg/mL oral syringe      -- PER G TUBE Daily        Steroids:   Hormones     " Start     Stop Route Frequency Ordered    04/06/18 0900  predniSONE tablet 7.5 mg      -- PER G TUBE Daily 04/05/18 0926        Pressors:    Autonomic Drugs     None        Hyperglycemia/Diabetes Medications: Antihyperglycemics     Start     Stop Route Frequency Ordered    04/07/18 1116  insulin aspart U-100 pen 0-4 Units      -- SubQ As needed (PRN) 04/07/18 1017    04/07/18 1015  insulin regular (Humulin R) 100 Units in sodium chloride 0.9% 100 mL infusion      -- IV Continuous 04/07/18 1017

## 2018-04-07 NOTE — SUBJECTIVE & OBJECTIVE
Interval History:   Patient evaluated at bedside, no significant event overnight. Total intake 1.9 L and output 1.1 L     Review of patient's allergies indicates:   Allergen Reactions    No known drug allergies      Current Facility-Administered Medications   Medication Frequency    0.9%  NaCl infusion (for blood administration) Q24H PRN    alprazolam ODT dissolvable tablet 0.5 mg TID PRN    chlorhexidine 0.12 % solution 15 mL BID    dextrose 50% injection 12.5 g PRN    dextrose 50% injection 12.5 g PRN    dextrose 50% injection 25 g PRN    escitalopram oxalate tablet 20 mg Daily    famotidine tablet 20 mg QHS    glucagon (human recombinant) injection 1 mg PRN    heparin (porcine) injection 5,000 Units Q12H    insulin regular (Humulin R) 100 Units in sodium chloride 0.9% 100 mL infusion Continuous    levothyroxine tablet 137 mcg Daily    oxyCODONE 5 mg/5 mL solution 10 mg TID    oxyCODONE 5 mg/5 mL solution 10 mg Q4H PRN    povidone-iodine 10 % ointment PRN    predniSONE tablet 7.5 mg Daily    sodium chloride 0.9% flush 3 mL Q8H    tacrolimus (PROGRAF) 1 mg/mL oral syringe Daily    tacrolimus (PROGRAF) 1 mg/mL oral syringe Daily       Objective:     Vital Signs (Most Recent):  Temp: 98.7 °F (37.1 °C) (04/07/18 0300)  Pulse: (!) 120 (04/07/18 0730)  Resp: 16 (04/07/18 0730)  BP: (!) 80/54 (04/07/18 0600)  SpO2: 100 % (04/07/18 0730)  O2 Device (Oxygen Therapy): Trach Collar (04/07/18 0730) Vital Signs (24h Range):  Temp:  [97.7 °F (36.5 °C)-98.7 °F (37.1 °C)] 98.7 °F (37.1 °C)  Pulse:  [111-127] 120  Resp:  [6-25] 16  SpO2:  [98 %-100 %] 100 %  BP: ()/(51-69) 80/54     Weight: 63.1 kg (139 lb 1.8 oz) (04/05/18 1300)  Body mass index is 21.79 kg/m².  Body surface area is 1.73 meters squared.    I/O last 3 completed shifts:  In: 4110.2 [I.V.:2690.2; NG/GT:1420]  Out: 3597 [Other:3597]    Physical Exam   Constitutional: He appears well-developed and well-nourished. No distress.   HENT:   Head:  Normocephalic and atraumatic.   Eyes: Conjunctivae and EOM are normal.   Cardiovascular: Regular rhythm.  Tachycardia present.    Pulmonary/Chest: He has no wheezes. He has no rales.   Abdominal: Soft. He exhibits no distension.   Musculoskeletal: He exhibits edema. He exhibits no tenderness or deformity.   Neurological: He is alert.   Skin: Skin is warm and dry. He is not diaphoretic.       Significant Labs:  ABGs:   Recent Labs  Lab 04/07/18  0526   PH 7.478*   PCO2 42.2   HCO3 31.2*   POCSATURATED 72*   BE 8     BMP:   Recent Labs  Lab 04/07/18  0305   *  129*     102   CO2 30*  30*   BUN 28*  28*   CREATININE 1.5*  1.5*   CALCIUM 8.4*  8.4*   MG 1.9     CBC:   Recent Labs  Lab 04/06/18  2034   WBC 4.13   RBC 2.60*   HGB 8.1*   HCT 24.2*   PLT SEE COMMENT   MCV 93   MCH 31.2*   MCHC 33.5     CMP:   Recent Labs  Lab 04/07/18  0305   *  129*   CALCIUM 8.4*  8.4*   ALBUMIN 2.0*  2.0*   PROT 5.1*     139   K 3.8  3.8   CO2 30*  30*     102   BUN 28*  28*   CREATININE 1.5*  1.5*   ALKPHOS 150*   ALT 5*   AST 17   BILITOT 0.7     All labs within the past 24 hours have been reviewed.

## 2018-04-08 NOTE — PLAN OF CARE
Problem: Physical Therapy Goal  Goal: Physical Therapy Goal  Goals to be met by: 18     Patient will increase functional independence with mobility by performin. Supine to sit with Moderate Assistance - not met  2. Sit to supine with Moderate Assistance - not met  3. Rolling to Left and Right with Minimal Assistance. - not met  4. Sit to stand transfer with Moderate Assistance - not met  5. Bed to chair transfer with Maximum Assistance - not met  6. Gait  x 20 feet with Minimal Assistance. - not met      Outcome: Ongoing (interventions implemented as appropriate)  Treatment completed and no goals met. Goals appropriate

## 2018-04-08 NOTE — ASSESSMENT & PLAN NOTE
BG goal 140-1180    continue transition at 1u/hr, low correction q4hr while on TF    Pt is T1DM, do not suspend insulin >1 hr   If TF held, decrease insulin rate to 0.2u/hr - known to have controlled BG on basal needs of lev 5 daily/ 0.2u/hr during this hospitalization     Discharge Recommendations:  TBD.

## 2018-04-08 NOTE — PLAN OF CARE
Problem: Patient Care Overview  Goal: Plan of Care Review  Outcome: Ongoing (interventions implemented as appropriate)  Pt has tolerated trach collar since 0730 without respiratory complications. There have been about 3 instances where he complained about SOB but on auscultation lung sounds clear and after suctioning only minimal amount of thin, watery secretions removed. His O2 sats have % on pulse ox most of the day. After asking pt he affirmed that he did feel anxious. PRN alprazolam given. Afterward he stated that he felt more comfortable. Passive ROM done with pt. He was also sat in chair position in the bed for several hours today. Mentation appears to be closer to baseline and noteably clear than yesterday. Please see flowsheets for further details.

## 2018-04-08 NOTE — PROGRESS NOTES
43 yo M w/ systolic CHF s/p heart transplant 11/2014, DM I, and hypothyroidism 2/2 Hashimoto's thyroiditis presented to [facility] 03/11/18 with several weeks of diarrhea, admitted by [facility] and we are consulted for AMS.  History is notable for worsening MS and +/- tremors with subacute onset after procedures.  Interval Hx notable for improving MS near baseline (per wife).  Exam is notable for afebrile awake male with trach who is A+OX1, follow simple commands, tracks, CN intact, minimal movement in all extremities.  Workup is notable anemia, stable but abnormal RFTs  Pt's presentation is concerning for multifactorial ENCP(polypharm, chronic immunosuppression, infectious)  Currently low suspicion for epileptic or vascular(PRES) as etiologies.  MS improved.    Supportive care  Sleep/wake    Thank you for involving Neurology in the care of this patient.  We will sign off. Please contact with further questions.        Shukri Yusuf MD  Neurologist  [facility]

## 2018-04-08 NOTE — PROGRESS NOTES
"[facility]  Endocrinology  Progress Note    Admit Date: 3/11/2018     Reason for Consult: abnormal TFTs    Surgical Procedure and Date: s/p heart transplant 2014     Diabetes diagnosis year: 7yo (T1DM)     Home Diabetes Medications:    Levemir 15u qHS  Novolog 4u AC     How often checking glucose at home? 4 times daily   BG readings on regimen: 150-200s  Hypoglycemia on the regimen?  Yes, rarely - treats appropriately (light snack, glucose tab)  Missed doses on regimen?  No        Diabetes Complications include:     Hyperglycemia, Hypoglycemia  and Diabetic chronic kidney disease          Complicating diabetes co morbidities:   N/A     HPI:   Patient is a 42 y.o. male with a diagnosis of T1DM, HTN, hypothyroidism, s/p heart transplant.  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 He required serial paracentesis and thoracentesis until he underwent a VATS with pleurX catheter placement on 1/11/2018 and removed 2/7/18.       Presented to hospital secondary to diarrhea and cough.  Upon initial labs, TSH was decreased (0.101) and free T4 1.33.  Endocrinology consulted to assist in management of these labs.  He was last seen in clinic by Kamala Vázquez NP 11/2017.   Previous hospitalization, endocrine consulted for same problem.  During that visit, recommended decreasing LT4 to 125mcg daily. Unfortunately, patient was discharged on previous dose of 150mcg daily.     Interval HPI:   Overnight events:  Neuro on board for AMS eval   BG at goal on trans 1u/hr  Alternating b/w trach and vent   Tolerating TF  PDN 7.5mg daily    BP (!) 109/56 (BP Location: Right arm, Patient Position: Lying)   Pulse 107   Temp 98 °F (36.7 °C) (Oral)   Resp 10   Ht 5' 7" (1.702 m)   Wt 63.1 kg (139 lb 1.8 oz)   SpO2 100%   BMI 21.79 kg/m²       Labs Reviewed and Include      Recent Labs  Lab 04/08/18  0318   GLU " 146*  146*   CALCIUM 8.7  8.7   ALBUMIN 2.1*  2.1*   PROT 5.5*     137   K 4.8  4.8   CO2 28  28     100   BUN 53*  53*   CREATININE 2.5*  2.5*   ALKPHOS 170*   ALT 7*   AST 21   BILITOT 0.7     Lab Results   Component Value Date    WBC 3.68 (L) 04/07/2018    HGB 8.4 (L) 04/07/2018    HCT 25.9 (L) 04/07/2018    MCV 96 04/07/2018     (L) 04/07/2018     No results for input(s): TSH, FREET4 in the last 168 hours.  Lab Results   Component Value Date    HGBA1C 5.1 04/05/2018       Nutritional status:   Body mass index is 21.79 kg/m².  Lab Results   Component Value Date    ALBUMIN 2.1 (L) 04/08/2018    ALBUMIN 2.1 (L) 04/08/2018    ALBUMIN 2.2 (L) 04/07/2018     Lab Results   Component Value Date    PREALBUMIN 13 (L) 04/08/2018    PREALBUMIN 5 (L) 03/15/2018    PREALBUMIN 18 (L) 03/24/2015       Estimated Creatinine Clearance: 33.7 mL/min (A) (based on SCr of 2.5 mg/dL (H)).    Accu-Checks  Recent Labs      04/07/18   0522  04/07/18   0612  04/07/18   0750  04/07/18   0836  04/07/18   0946  04/07/18   1145  04/07/18   1603  04/07/18   2028  04/07/18   2346  04/08/18   0418   POCTGLUCOSE  128*  139*  158*  167*  204*  181*  156*  197*  182*  154*       Current Medications and/or Treatments Impacting Glycemic Control  Immunotherapy:  Immunosuppressants         Stop Route Frequency     tacrolimus (PROGRAF) 1 mg/mL oral syringe      -- PER G TUBE Daily     tacrolimus (PROGRAF) 1 mg/mL oral syringe      -- PER G TUBE Daily        Steroids:   Hormones     Start     Stop Route Frequency Ordered    04/06/18 0900  predniSONE tablet 7.5 mg      -- PER G TUBE Daily 04/05/18 0926        Pressors:    Autonomic Drugs     None        Hyperglycemia/Diabetes Medications: Antihyperglycemics     Start     Stop Route Frequency Ordered    04/07/18 1116  insulin aspart U-100 pen 0-4 Units      -- SubQ As needed (PRN) 04/07/18 1017    04/07/18 1015  insulin regular (Humulin R) 100 Units in sodium chloride 0.9% 100 mL  infusion      -- IV Continuous 04/07/18 1017          ASSESSMENT and PLAN    Type 1 diabetes mellitus with stage 3 chronic kidney disease    BG goal 140-1180    continue transition at 1u/hr, low correction q4hr while on TF    Pt is T1DM, do not suspend insulin >1 hr   If TF held, decrease insulin rate to 0.2u/hr - known to have controlled BG on basal needs of lev 5 daily/ 0.2u/hr during this hospitalization     Discharge Recommendations:  TBD.          On enteral nutrition     May increase insulin needs                Hypothyroidism due to Hashimoto's thyroiditis     Continue Lt4 137mcg per G tube    Ideally should hold TF 1 hr before and 1 hr after given LT4       Recheck TFTs in 4 weeks (around 5/6)                      Current use of steroid medication     Agree with PDN 7.5mg daily                         Acute renal failure superimposed on stage 4 chronic kidney disease     Avoid insulin stacking             Heart transplanted     Per transplant  Avoid hypoglycemia       Palmira Dorsey MD  Endocrinology  [facility]

## 2018-04-08 NOTE — PROGRESS NOTES
"[facility]  Heart Transplant  Progress Note    Patient Name: Lv Crocker  MRN: [id number]  Admission Date: 3/11/2018  Hospital Length of Stay: 27 days  Attending Physician: Heriberto Rosa MD  Primary Care Provider: Philippe Mohr MD  Principal Problem:Acute respiratory failure with hypoxia    Subjective:     Interval History: No complaints this am. "Starving".    Continuous Infusions:   sodium chloride 0.9%      insulin (HUMAN R) infusion (adults) 0.8 Units/hr (04/08/18 1015)     Scheduled Meds:   chlorhexidine  15 mL Mouth/Throat BID    escitalopram oxalate  20 mg Per G Tube Daily    famotidine  20 mg Per G Tube QHS    heparin (porcine)  5,000 Units Subcutaneous Q12H    levothyroxine  137 mcg Per G Tube Daily    oxyCODONE  10 mg Per G Tube TID    polyethylene glycol  17 g Oral Daily    predniSONE  7.5 mg Per G Tube Daily    sennosides 8.8 mg/5 ml  5 mL Oral QHS    sodium chloride 0.9%  3 mL Intravenous Q8H    tacrolimus  2 mg Per G Tube Daily    tacrolimus  1 mg Per G Tube Daily     PRN Meds:sodium chloride, alprazolam ODT, dextrose 50%, dextrose 50%, glucagon (human recombinant), glucose, glucose, insulin aspart U-100, metoclopramide HCl, oxyCODONE, povidone-iodine    Review of patient's allergies indicates:   Allergen Reactions    No known drug allergies      Objective:     Vital Signs (Most Recent):  Temp: 98 °F (36.7 °C) (04/08/18 0700)  Pulse: (!) 111 (04/08/18 1000)  Resp: 12 (04/08/18 1000)  BP: 110/61 (04/08/18 1000)  SpO2: 100 % (04/08/18 1000) Vital Signs (24h Range):  Temp:  [97.5 °F (36.4 °C)-98 °F (36.7 °C)] 98 °F (36.7 °C)  Pulse:  [101-116] 111  Resp:  [4-23] 12  SpO2:  [94 %-100 %] 100 %  BP: ()/(54-80) 110/61     Patient Vitals for the past 72 hrs (Last 3 readings):   Weight   04/05/18 1300 63.1 kg (139 lb 1.8 oz)     Body mass index is 21.79 kg/m².      Intake/Output Summary (Last 24 hours) at 04/08/18 1032  Last data filed at 04/08/18 1000   " Gross per 24 hour   Intake           1043.5 ml   Output                0 ml   Net           1043.5 ml     Hemodynamic Parameters:    Physical Exam   Constitutional: He appears well-developed and well-nourished. No distress.   HENT:   Head: Normocephalic and atraumatic.   Eyes: EOM are normal. No scleral icterus.   Neck: Normal range of motion.   Trach in place    Cardiovascular: Regular rhythm.    tachycardic   Pulmonary/Chest: Effort normal. No respiratory distress.   Perm cath in place no signs of infection   Abdominal:   Soft, NT, previous chest tube scars noted  No distension, no fluid wave  g tube in place with green drainage, wound C/D/I   Musculoskeletal: He exhibits no edema or tenderness.   Neurological: He is alert.   Skin: Skin is warm and dry.     Significant Labs:  CBC:    Recent Labs  Lab 04/06/18 2034 04/07/18  1120 04/07/18 2010   WBC 4.13 3.88* 3.68*   RBC 2.60* 2.55* 2.70*   HGB 8.1* 8.0* 8.4*   HCT 24.2* 24.7* 25.9*   PLT SEE COMMENT 110* 107*   MCV 93 97 96   MCH 31.2* 31.4* 31.1*   MCHC 33.5 32.4 32.4     BNP:  No results for input(s): BNP in the last 168 hours.    Invalid input(s): BNPTRIAGELBLO  CMP:    Recent Labs  Lab 04/06/18  1515  04/07/18  0305 04/07/18  1434 04/07/18 2010 04/08/18  0318   *  325*  < > 129*  129* 142* 190* 146*  146*   CALCIUM 8.4*  8.4*  < > 8.4*  8.4* 8.8 8.8 8.7  8.7   ALBUMIN 2.2*  2.2*  < > 2.0*  2.0* 2.2* 2.2* 2.1*  2.1*   PROT 5.5*  --  5.1*  --   --  5.5*     138  < > 139  139 139 137 137  137   K 3.9  3.9  < > 3.8  3.8 4.4 4.7 4.8  4.8   CO2 25  25  < > 30*  30* 31* 29 28  28   CL 99  99  < > 102  102 101 100 100  100   BUN 14  14  < > 28*  28* 39* 44* 53*  53*   CREATININE 0.9  0.9  < > 1.5*  1.5* 2.0* 2.2* 2.5*  2.5*   ALKPHOS 162*  --  150*  --   --  170*   ALT 7*  --  5*  --   --  7*   AST 20  --  17  --   --  21   BILITOT 0.8  --  0.7  --   --  0.7   < > = values in this interval not displayed.   Coagulation:   No  results for input(s): PT, INR, APTT in the last 168 hours.  LDH:  No results for input(s): LDH in the last 72 hours.  Microbiology:  Microbiology Results (last 7 days)     Procedure Component Value Units Date/Time    Fungus culture [[id number]] Collected:  03/28/18 1607    Order Status:  Completed Specimen:  Body Fluid from Lung, RLL Updated:  04/05/18 1118     Fungus (Mycology) Culture Culture in progress    Culture, Anaerobe [[id number]] Collected:  03/28/18 1607    Order Status:  Completed Specimen:  Body Fluid from Lung, RLL Updated:  04/02/18 1251     Anaerobic Culture No anaerobes isolated          I have reviewed all pertinent labs within the past 24 hours.    Estimated Creatinine Clearance: 33.7 mL/min (A) (based on SCr of 2.5 mg/dL (H)).    Diagnostic Results:  I have reviewed and interpreted all pertinent imaging results/findings within the past 24 hours.    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 Most recently has had ongoing issues with his lungs, had gotten 2 thoracenteses, after which he underwent VATS 01/19/18 followed by pleurex catheter placement and effusion drainage 01/11/18, subsequently had it removed 02/07/18 after drainage had decreased despite multiple attempts to drain it. Has been having significant coughing fits that result in him being near-syncopal at times, although difficult to say if he has passed out or not- patient most recently was admitted to the hospital for increased AGUILAR, coughing and pre-syncope 02/11-02/14/18 at [facility].   Patient was started on antibiotics for suspected bacterial infection, with some diarrhea, bronchitis, and possible pneumonia. Ct chest showed some pleural fluid collection and after talking with Dr. James, we arranged for him to receive a diagnostic tap with IR,  from which the fluid collection demonstrated no growth or significant findings at all really. Cell counts do not appear to have been sent but will recheck. Patient states since his hospital stay, yesterday had a significant coughing fit again, after which he nearly passed out, is being seen in clinic today for this. Denies any cardiopulmonary complaints, no leg swelling, has some abdominal swelling, which is moderate for him. Denies significant shortness of breath with mild exertion, had 6MWT yesterday to see if he qualified for home O2, and his O2 sats actually improved after recovery from where he started initially. He and his wife admit he is in bed most days, not very active.     Mr. Crocker presented to the ED 3/11/18 with approximately 3 weeks of worsening diarrhea and 2-3 days of sinus pressure, drainage, and worsened cough.  He notes that he had a coughing fit last night and passed out, coughed throughout most of the night.  This also happened on 2/25/18.  He last saw Dr Ferreira in clinic on 2/20/18 where he was taken off myfortic and switched to cellcept (he hadn't been on cellcept because of leukopenia when they tried post-transplant).  Though his diarrhea had improved after his last discharge, he states it has increased now to 15x/day, clear/yellow/green, no fevers, no exacerbating foods per say.  He was taken back off the cellcept and placed back on myfortic this past week and has not noticed immediate change in diarrhea. He also reports his baseline coughing fits havent improved and are minimally responsive to tessalon pearls.  Came on last night, he lost consciousness during a fit once which is relatively normal for him, and had two more during HPI.  He notes no sick contacts but does state he's had some URI symptoms as above.  His baseline vitals are usually -120 and BP 90s/60s-110s/70s.  He reports a history of intermittent diarrhea - did have Cdiff many years ago but this smells different.  No  abdominal pain, no nausea, no vomiting.  No blood in diarrhea.  Appears last c-scope in 2015 with no evidence of infection or inflammatory processes.  He does report IBS which is different that this.       * Acute respiratory failure with hypoxia    - S/P Bronch and intubation 3/14 now post tracheostomy due to inability to extubate   - Pulmonology following to help manage vent wean. Appreciate Recs   - RSV positive early in hospitalization. Completed course of Ribavarin/IVIG. Per lung transplant protocol for severe RSV (given myelosuppression).   - ID following. Completed 7 day course of Meropenem/Linezolid.   - All cultures remain negative.   - trach collar today, so far tolerating well  - Appreciate PT/OT/Wound care.        Acute renal failure superimposed on stage 4 chronic kidney disease    - Appreciate Nephrology recs.   - Intermittent CRRT.  - permacath placed in OR        Heart transplanted    - TTE 3/26/18 showed normal graft function but has known history of restrictive CM post transplant.  - Continue pred 7.5, [place] per G tube 1/2  - Cellcept remains on hold.        Restrictive cardiomyopathy    - Has known history of recurrent ascites and pleural effusions   - S/P thoracentesis X 2, followed by VATS/pleurex cath placement (January 2018) with removal of pleurex (February 2018) and 3rd thoracentesis 2/14/18 with no significant findings on fluid removal.        Anemia    - transfused one unit 4/4        Type 1 diabetes mellitus with stage 3 chronic kidney disease    - Appreciate Endocrine's recs  - Insulin gtt per endocrine recs  - Recent blood transfusions will likely affect A1cs (will appear lower than they likely are)         Hypothyroidism due to Hashimoto's thyroiditis    - Appreciate Endocrine's recs  - Continue IV levothyroxine 88mcg        Debility    - PT/OT daily   - This will take time and require adequate nutrition  - PEG tube placed, tolerating tube feeds well        Anxiety    - Pt on  nortriptyline/escitalopram at home. Held as no enteric feeding option at this point.   - Continue prn low dose xanax ODT.             Tim Mao NP  Heart Transplant  [facility]

## 2018-04-08 NOTE — SUBJECTIVE & OBJECTIVE
"Interval HPI:   Overnight events:  Neuro on board for AMS eval   BG at goal on trans 1u/hr  Alternating b/w trach and vent   Tolerating TF  PDN 7.5mg daily    BP (!) 109/56 (BP Location: Right arm, Patient Position: Lying)   Pulse 107   Temp 98 °F (36.7 °C) (Oral)   Resp 10   Ht 5' 7" (1.702 m)   Wt 63.1 kg (139 lb 1.8 oz)   SpO2 100%   BMI 21.79 kg/m²     Labs Reviewed and Include      Recent Labs  Lab 04/08/18  0318   *  146*   CALCIUM 8.7  8.7   ALBUMIN 2.1*  2.1*   PROT 5.5*     137   K 4.8  4.8   CO2 28  28     100   BUN 53*  53*   CREATININE 2.5*  2.5*   ALKPHOS 170*   ALT 7*   AST 21   BILITOT 0.7     Lab Results   Component Value Date    WBC 3.68 (L) 04/07/2018    HGB 8.4 (L) 04/07/2018    HCT 25.9 (L) 04/07/2018    MCV 96 04/07/2018     (L) 04/07/2018     No results for input(s): TSH, FREET4 in the last 168 hours.  Lab Results   Component Value Date    HGBA1C 5.1 04/05/2018       Nutritional status:   Body mass index is 21.79 kg/m².  Lab Results   Component Value Date    ALBUMIN 2.1 (L) 04/08/2018    ALBUMIN 2.1 (L) 04/08/2018    ALBUMIN 2.2 (L) 04/07/2018     Lab Results   Component Value Date    PREALBUMIN 13 (L) 04/08/2018    PREALBUMIN 5 (L) 03/15/2018    PREALBUMIN 18 (L) 03/24/2015       Estimated Creatinine Clearance: 33.7 mL/min (A) (based on SCr of 2.5 mg/dL (H)).    Accu-Checks  Recent Labs      04/07/18   0522  04/07/18   0612  04/07/18   0750  04/07/18   0836  04/07/18   0946  04/07/18   1145  04/07/18   1603  04/07/18   2028  04/07/18   2346  04/08/18   0418   POCTGLUCOSE  128*  139*  158*  167*  204*  181*  156*  197*  182*  154*       Current Medications and/or Treatments Impacting Glycemic Control  Immunotherapy:  Immunosuppressants         Stop Route Frequency     tacrolimus (PROGRAF) 1 mg/mL oral syringe      -- PER G TUBE Daily     tacrolimus (PROGRAF) 1 mg/mL oral syringe      -- PER G TUBE Daily        Steroids:   Hormones     Start     Stop " Route Frequency Ordered    04/06/18 0900  predniSONE tablet 7.5 mg      -- PER G TUBE Daily 04/05/18 0926        Pressors:    Autonomic Drugs     None        Hyperglycemia/Diabetes Medications: Antihyperglycemics     Start     Stop Route Frequency Ordered    04/07/18 1116  insulin aspart U-100 pen 0-4 Units      -- SubQ As needed (PRN) 04/07/18 1017    04/07/18 1015  insulin regular (Humulin R) 100 Units in sodium chloride 0.9% 100 mL infusion      -- IV Continuous 04/07/18 1017

## 2018-04-08 NOTE — ASSESSMENT & PLAN NOTE
- baseline sCr 2.6-3.0 consistent with CKD stage IV  - anuric CACHORRO; likely ischemic ATN from prolonged pre-renal state (diarrhea) in setting of prograf renal vasoconstriction; cannot r/o septic ATN or Prograf toxicity  - SLED started 3/14. TDC placed 4/4/18.   - remains anuric  - Blood pressure have been stable today   - will schedule SLED overnight for clearance and volume removal, duration 12 hrs and -350 CC/HR as tolerated by patient maintain MAP>65

## 2018-04-08 NOTE — PLAN OF CARE
Problem: Patient Care Overview  Goal: Plan of Care Review  Outcome: Ongoing (interventions implemented as appropriate)  No acute events throughout day. See vital signs and assessments in flowsheets. See below for updates on today's progress.     Pulmonary: Patient placed on trach collar at beginning of shift, tolerating well. Lung sounds coarse to auscultation.    Cardiovascular: St throughout shift, BP WNL    Neurological: Patient appears AAOx4, follows commands. Sat on EOB x15 minutes with PT.    Gastrointestinal: TF held temporarily due to increased residuals. Restarted at 10mL/hr.    Genitourinary: Anuric, CRRT started approx 1315 x12 hours SLED     Endocrine: Accu checks q4 on insulin gtt.    Integumentary/Other: See flowsheets for charting    Infusions: Insulin maintained    Patient progressing towards goals as tolerated, plan of care communicated and reviewed with Lv Crocker and family. All concerns addressed. Will continue to monitor.

## 2018-04-08 NOTE — PROGRESS NOTES
[facility]  Nephrology  Progress Note    Patient Name: Lv Crocker  MRN: [id number]  Admission Date: 3/11/2018  Hospital Length of Stay: 27 days  Attending Provider: Heriberto Rosa MD   Primary Care Physician: Philippe Mohr MD  Principal Problem:Acute respiratory failure with hypoxia    Subjective:     HPI: Mr. Crocker is a 43 yo WM with T1DM, Hashimoto thyroiditis, h/o OHTx 11/2014, and CKD stage 4 who was admitted on 3/11/18 for persistent diarrhea. He reported a 3 week history of diarrhea up to 15x/day. Associated symptoms including URI symptoms, coughing fits, and coughing syncope. Prograf level was 14.3. His post-heart transplant course has been complicated by [facility]R 4/2015, CMV, post-transplant restrictive cardiomyopathy, recurrent pleural effusions/ascites requiring monthly thoracenteses/paracenteses, VATS 1/11/18 with Pleur-X catheter (now removed), and CKD stage 4 with baseline sCr 2.6-3.0. Baseline -120s and baseline BP 90s-110s/60-70s. Upon admission he was started on IVF and diarrhea workup was initiated. On 3/12 he was noted to have decreased UOP despite fluids; NS was increased to 100cc/hr. He was scheduled for a colonoscopy on 3/13 however procedure was cancelled due to hypoxia and fever. He was transferred to the ICU for closer monitoring. He continued to have oliguria overnight. Bladder scan revealed 200cc of urine but patient refused osullivan catheter placement. Wife reports that patient always has a hard time urinating again after osullivan catheters are placed/removed so he refuses them. He remained hypoxic despite FiO2 70% and ABG revealed combined respiratory and metabolic acidosis with pH 7.17. He was started on BiPAP as well as a bicarb infusion at 50cc/hr. ABG with mild improvement. AM labs revealed K 6.2 and he was shifted and given kayexalate.Trialysis catheter was placed this morning and he subsequently developed hypotension and was started on levophed. He  was intubated later this morning. Nephrology consulted for CACHORRO. All history obtained by primary team and chart review as patient was intubated/sedated on exam. Consent for dialysis obtained by patient's wife and placed in chart. Of note, both of patient's parents were on dialysis.     Interval History:   Patient evaluated at bedside, no significant event overnight.     Review of patient's allergies indicates:   Allergen Reactions    No known drug allergies      Current Facility-Administered Medications   Medication Frequency    0.9%  NaCl infusion (for blood administration) Q24H PRN    alprazolam ODT dissolvable tablet 0.5 mg TID PRN    chlorhexidine 0.12 % solution 15 mL BID    dextrose 50% injection 12.5 g PRN    dextrose 50% injection 25 g PRN    escitalopram oxalate tablet 20 mg Daily    famotidine tablet 20 mg QHS    glucagon (human recombinant) injection 1 mg PRN    glucose chewable tablet 16 g PRN    glucose chewable tablet 24 g PRN    heparin (porcine) injection 5,000 Units Q12H    insulin aspart U-100 pen 0-4 Units PRN    insulin regular (Humulin R) 100 Units in sodium chloride 0.9% 100 mL infusion Continuous    levothyroxine tablet 137 mcg Daily    metoclopramide HCl injection 5 mg Q6H PRN    oxyCODONE 5 mg/5 mL solution 10 mg TID    oxyCODONE 5 mg/5 mL solution 10 mg Q4H PRN    polyethylene glycol packet 17 g Daily    povidone-iodine 10 % ointment PRN    predniSONE tablet 7.5 mg Daily    sodium chloride 0.9% flush 3 mL Q8H    tacrolimus (PROGRAF) 1 mg/mL oral syringe Daily    tacrolimus (PROGRAF) 1 mg/mL oral syringe Daily       Objective:     Vital Signs (Most Recent):  Temp: 98 °F (36.7 °C) (04/08/18 0700)  Pulse: 107 (04/08/18 0754)  Resp: 10 (04/08/18 0754)  BP: (!) 109/56 (04/08/18 0700)  SpO2: 100 % (04/08/18 0754)  O2 Device (Oxygen Therapy): tracheostomy collar (04/08/18 0754) Vital Signs (24h Range):  Temp:  [97.5 °F (36.4 °C)-98 °F (36.7 °C)] 98 °F (36.7 °C)  Pulse:   [101-119] 107  Resp:  [4-23] 10  SpO2:  [94 %-100 %] 100 %  BP: ()/(54-80) 109/56     Weight: 63.1 kg (139 lb 1.8 oz) (04/05/18 1300)  Body mass index is 21.79 kg/m².  Body surface area is 1.73 meters squared.    I/O last 3 completed shifts:  In: 1894.2 [I.V.:304.2; Other:200; NG/GT:1390]  Out: -     Physical Exam   Constitutional: He appears well-developed and well-nourished. No distress.   HENT:   Head: Normocephalic and atraumatic.   Eyes: Conjunctivae and EOM are normal.   Cardiovascular: Regular rhythm.  Tachycardia present.    Pulmonary/Chest: He has no wheezes. He has no rales.   Abdominal: Soft. He exhibits no distension.   Musculoskeletal: He exhibits edema. He exhibits no tenderness or deformity.   Neurological: He is alert.   Skin: Skin is warm and dry. He is not diaphoretic.       Significant Labs:  ABGs:   Recent Labs  Lab 04/08/18  0419   PH 7.497*   PCO2 50.2*   HCO3 38.9*   POCSATURATED 47*   BE 16     BMP:   Recent Labs  Lab 04/08/18 0318   *  146*     100   CO2 28  28   BUN 53*  53*   CREATININE 2.5*  2.5*   CALCIUM 8.7  8.7   MG 2.1     CBC:   Recent Labs  Lab 04/07/18 2010   WBC 3.68*   RBC 2.70*   HGB 8.4*   HCT 25.9*   *   MCV 96   MCH 31.1*   MCHC 32.4     CMP:   Recent Labs  Lab 04/08/18 0318   *  146*   CALCIUM 8.7  8.7   ALBUMIN 2.1*  2.1*   PROT 5.5*     137   K 4.8  4.8   CO2 28  28     100   BUN 53*  53*   CREATININE 2.5*  2.5*   ALKPHOS 170*   ALT 7*   AST 21   BILITOT 0.7     All labs within the past 24 hours have been reviewed.       Assessment/Plan:     Acute renal failure superimposed on stage 4 chronic kidney disease    - baseline sCr 2.6-3.0 consistent with CKD stage IV  - anuric CACHORRO; likely ischemic ATN from prolonged pre-renal state (diarrhea) in setting of prograf renal vasoconstriction; cannot r/o septic ATN or Prograf toxicity  - SLED started 3/14. TDC placed 4/4/18.   - remains anuric  - Blood pressure have been  stable today   - will schedule SLED overnight for clearance and volume removal, duration 12 hrs and -350 CC/HR as tolerated by patient maintain MAP>65           Edson Soriano  Nephrology  Fellow  [facility]    Pager [phone]

## 2018-04-08 NOTE — SUBJECTIVE & OBJECTIVE
Interval History:   Patient evaluated at bedside, no significant event overnight.     Review of patient's allergies indicates:   Allergen Reactions    No known drug allergies      Current Facility-Administered Medications   Medication Frequency    0.9%  NaCl infusion (for blood administration) Q24H PRN    alprazolam ODT dissolvable tablet 0.5 mg TID PRN    chlorhexidine 0.12 % solution 15 mL BID    dextrose 50% injection 12.5 g PRN    dextrose 50% injection 25 g PRN    escitalopram oxalate tablet 20 mg Daily    famotidine tablet 20 mg QHS    glucagon (human recombinant) injection 1 mg PRN    glucose chewable tablet 16 g PRN    glucose chewable tablet 24 g PRN    heparin (porcine) injection 5,000 Units Q12H    insulin aspart U-100 pen 0-4 Units PRN    insulin regular (Humulin R) 100 Units in sodium chloride 0.9% 100 mL infusion Continuous    levothyroxine tablet 137 mcg Daily    metoclopramide HCl injection 5 mg Q6H PRN    oxyCODONE 5 mg/5 mL solution 10 mg TID    oxyCODONE 5 mg/5 mL solution 10 mg Q4H PRN    polyethylene glycol packet 17 g Daily    povidone-iodine 10 % ointment PRN    predniSONE tablet 7.5 mg Daily    sodium chloride 0.9% flush 3 mL Q8H    tacrolimus (PROGRAF) 1 mg/mL oral syringe Daily    tacrolimus (PROGRAF) 1 mg/mL oral syringe Daily       Objective:     Vital Signs (Most Recent):  Temp: 98 °F (36.7 °C) (04/08/18 0700)  Pulse: 107 (04/08/18 0754)  Resp: 10 (04/08/18 0754)  BP: (!) 109/56 (04/08/18 0700)  SpO2: 100 % (04/08/18 0754)  O2 Device (Oxygen Therapy): tracheostomy collar (04/08/18 0754) Vital Signs (24h Range):  Temp:  [97.5 °F (36.4 °C)-98 °F (36.7 °C)] 98 °F (36.7 °C)  Pulse:  [101-119] 107  Resp:  [4-23] 10  SpO2:  [94 %-100 %] 100 %  BP: ()/(54-80) 109/56     Weight: 63.1 kg (139 lb 1.8 oz) (04/05/18 1300)  Body mass index is 21.79 kg/m².  Body surface area is 1.73 meters squared.    I/O last 3 completed shifts:  In: 1894.2 [I.V.:304.2; Other:200;  NG/GT:1390]  Out: -     Physical Exam   Constitutional: He appears well-developed and well-nourished. No distress.   HENT:   Head: Normocephalic and atraumatic.   Eyes: Conjunctivae and EOM are normal.   Cardiovascular: Regular rhythm.  Tachycardia present.    Pulmonary/Chest: He has no wheezes. He has no rales.   Abdominal: Soft. He exhibits no distension.   Musculoskeletal: He exhibits edema. He exhibits no tenderness or deformity.   Neurological: He is alert.   Skin: Skin is warm and dry. He is not diaphoretic.       Significant Labs:  ABGs:   Recent Labs  Lab 04/08/18 0419   PH 7.497*   PCO2 50.2*   HCO3 38.9*   POCSATURATED 47*   BE 16     BMP:   Recent Labs  Lab 04/08/18  0318   *  146*     100   CO2 28  28   BUN 53*  53*   CREATININE 2.5*  2.5*   CALCIUM 8.7  8.7   MG 2.1     CBC:   Recent Labs  Lab 04/07/18 2010   WBC 3.68*   RBC 2.70*   HGB 8.4*   HCT 25.9*   *   MCV 96   MCH 31.1*   MCHC 32.4     CMP:   Recent Labs  Lab 04/08/18 0318   *  146*   CALCIUM 8.7  8.7   ALBUMIN 2.1*  2.1*   PROT 5.5*     137   K 4.8  4.8   CO2 28  28     100   BUN 53*  53*   CREATININE 2.5*  2.5*   ALKPHOS 170*   ALT 7*   AST 21   BILITOT 0.7     All labs within the past 24 hours have been reviewed.

## 2018-04-08 NOTE — SUBJECTIVE & OBJECTIVE
"Interval History: No complaints this am. "Starving".    Continuous Infusions:   sodium chloride 0.9%      insulin (HUMAN R) infusion (adults) 0.8 Units/hr (04/08/18 1015)     Scheduled Meds:   chlorhexidine  15 mL Mouth/Throat BID    escitalopram oxalate  20 mg Per G Tube Daily    famotidine  20 mg Per G Tube QHS    heparin (porcine)  5,000 Units Subcutaneous Q12H    levothyroxine  137 mcg Per G Tube Daily    oxyCODONE  10 mg Per G Tube TID    polyethylene glycol  17 g Oral Daily    predniSONE  7.5 mg Per G Tube Daily    sennosides 8.8 mg/5 ml  5 mL Oral QHS    sodium chloride 0.9%  3 mL Intravenous Q8H    tacrolimus  2 mg Per G Tube Daily    tacrolimus  1 mg Per G Tube Daily     PRN Meds:sodium chloride, alprazolam ODT, dextrose 50%, dextrose 50%, glucagon (human recombinant), glucose, glucose, insulin aspart U-100, metoclopramide HCl, oxyCODONE, povidone-iodine    Review of patient's allergies indicates:   Allergen Reactions    No known drug allergies      Objective:     Vital Signs (Most Recent):  Temp: 98 °F (36.7 °C) (04/08/18 0700)  Pulse: (!) 111 (04/08/18 1000)  Resp: 12 (04/08/18 1000)  BP: 110/61 (04/08/18 1000)  SpO2: 100 % (04/08/18 1000) Vital Signs (24h Range):  Temp:  [97.5 °F (36.4 °C)-98 °F (36.7 °C)] 98 °F (36.7 °C)  Pulse:  [101-116] 111  Resp:  [4-23] 12  SpO2:  [94 %-100 %] 100 %  BP: ()/(54-80) 110/61     Patient Vitals for the past 72 hrs (Last 3 readings):   Weight   04/05/18 1300 63.1 kg (139 lb 1.8 oz)     Body mass index is 21.79 kg/m².      Intake/Output Summary (Last 24 hours) at 04/08/18 1032  Last data filed at 04/08/18 1000   Gross per 24 hour   Intake           1043.5 ml   Output                0 ml   Net           1043.5 ml     Hemodynamic Parameters:    Physical Exam   Constitutional: He appears well-developed and well-nourished. No distress.   HENT:   Head: Normocephalic and atraumatic.   Eyes: EOM are normal. No scleral icterus.   Neck: Normal range of " motion.   Trach in place    Cardiovascular: Regular rhythm.    tachycardic   Pulmonary/Chest: Effort normal. No respiratory distress.   Perm cath in place no signs of infection   Abdominal:   Soft, NT, previous chest tube scars noted  No distension, no fluid wave  g tube in place with green drainage, wound C/D/I   Musculoskeletal: He exhibits no edema or tenderness.   Neurological: He is alert.   Skin: Skin is warm and dry.     Significant Labs:  CBC:    Recent Labs  Lab 04/06/18  2034 04/07/18  1120 04/07/18 2010   WBC 4.13 3.88* 3.68*   RBC 2.60* 2.55* 2.70*   HGB 8.1* 8.0* 8.4*   HCT 24.2* 24.7* 25.9*   PLT SEE COMMENT 110* 107*   MCV 93 97 96   MCH 31.2* 31.4* 31.1*   MCHC 33.5 32.4 32.4     BNP:  No results for input(s): BNP in the last 168 hours.    Invalid input(s): BNPTRIAGELBLO  CMP:    Recent Labs  Lab 04/06/18  1515  04/07/18  0305 04/07/18  1434 04/07/18 2010 04/08/18  0318   *  325*  < > 129*  129* 142* 190* 146*  146*   CALCIUM 8.4*  8.4*  < > 8.4*  8.4* 8.8 8.8 8.7  8.7   ALBUMIN 2.2*  2.2*  < > 2.0*  2.0* 2.2* 2.2* 2.1*  2.1*   PROT 5.5*  --  5.1*  --   --  5.5*     138  < > 139  139 139 137 137  137   K 3.9  3.9  < > 3.8  3.8 4.4 4.7 4.8  4.8   CO2 25  25  < > 30*  30* 31* 29 28  28   CL 99  99  < > 102  102 101 100 100  100   BUN 14  14  < > 28*  28* 39* 44* 53*  53*   CREATININE 0.9  0.9  < > 1.5*  1.5* 2.0* 2.2* 2.5*  2.5*   ALKPHOS 162*  --  150*  --   --  170*   ALT 7*  --  5*  --   --  7*   AST 20  --  17  --   --  21   BILITOT 0.8  --  0.7  --   --  0.7   < > = values in this interval not displayed.   Coagulation:   No results for input(s): PT, INR, APTT in the last 168 hours.  LDH:  No results for input(s): LDH in the last 72 hours.  Microbiology:  Microbiology Results (last 7 days)     Procedure Component Value Units Date/Time    Fungus culture [[id number]] Collected:  03/28/18 1607    Order Status:  Completed Specimen:  Body Fluid from Lung, RLL  Updated:  04/05/18 1118     Fungus (Mycology) Culture Culture in progress    Culture, Anaerobe [[id number]] Collected:  03/28/18 1607    Order Status:  Completed Specimen:  Body Fluid from Lung, RLL Updated:  04/02/18 1251     Anaerobic Culture No anaerobes isolated          I have reviewed all pertinent labs within the past 24 hours.    Estimated Creatinine Clearance: 33.7 mL/min (A) (based on SCr of 2.5 mg/dL (H)).    Diagnostic Results:  I have reviewed and interpreted all pertinent imaging results/findings within the past 24 hours.

## 2018-04-09 NOTE — PROGRESS NOTES
[facility]  Nephrology  Progress Note    Patient Name: Lv Crocker  MRN: [id number]  Admission Date: 3/11/2018  Hospital Length of Stay: 28 days  Attending Provider: Heriberto Rosa MD   Primary Care Physician: Philippe Mohr MD  Principal Problem:Acute respiratory failure with hypoxia    Subjective:     HPI: Mr. Crocker is a 41 yo WM with T1DM, Hashimoto thyroiditis, h/o OHTx 11/2014, and CKD stage 4 who was admitted on 3/11/18 for persistent diarrhea. He reported a 3 week history of diarrhea up to 15x/day. Associated symptoms including URI symptoms, coughing fits, and coughing syncope. Prograf level was 14.3. His post-heart transplant course has been complicated by AMR 4/2015, CMV, post-transplant restrictive cardiomyopathy, recurrent pleural effusions/ascites requiring monthly thoracenteses/paracenteses, VATS 1/11/18 with Pleur-X catheter (now removed), and CKD stage 4 with baseline sCr 2.6-3.0. Baseline -120s and baseline BP 90s-110s/60-70s. Upon admission he was started on IVF and diarrhea workup was initiated. On 3/12 he was noted to have decreased UOP despite fluids; NS was increased to 100cc/hr. He was scheduled for a colonoscopy on 3/13 however procedure was cancelled due to hypoxia and fever. He was transferred to the ICU for closer monitoring. He continued to have oliguria overnight. Bladder scan revealed 200cc of urine but patient refused osullivan catheter placement. Wife reports that patient always has a hard time urinating again after osullivan catheters are placed/removed so he refuses them. He remained hypoxic despite FiO2 70% and ABG revealed combined respiratory and metabolic acidosis with pH 7.17. He was started on BiPAP as well as a bicarb infusion at 50cc/hr. ABG with mild improvement. AM labs revealed K 6.2 and he was shifted and given kayexalate.Trialysis catheter was placed this morning and he subsequently developed hypotension and was started on levophed. He  was intubated later this morning. Nephrology consulted for CACHORRO. All history obtained by primary team and chart review as patient was intubated/sedated on exam. Consent for dialysis obtained by patient's wife and placed in chart. Of note, both of patient's parents were on dialysis.     Interval History:   SLED discontinued this morning after 12 hours with adequate metabolic clearance.  Net negative 800 ml/24h.  Oxygenating well on RA.  VSS.    Review of patient's allergies indicates:   Allergen Reactions    No known drug allergies      Current Facility-Administered Medications   Medication Frequency    0.9%  NaCl infusion (for blood administration) Q24H PRN    alprazolam ODT dissolvable tablet 0.5 mg TID PRN    chlorhexidine 0.12 % solution 15 mL BID    dextrose 50% injection 12.5 g PRN    dextrose 50% injection 25 g PRN    escitalopram oxalate tablet 20 mg Daily    famotidine tablet 20 mg QHS    glucagon (human recombinant) injection 1 mg PRN    glucose chewable tablet 16 g PRN    glucose chewable tablet 24 g PRN    heparin (porcine) injection 5,000 Units Q12H    insulin aspart U-100 pen 0-4 Units PRN    insulin regular (Humulin R) 100 Units in sodium chloride 0.9% 100 mL infusion Continuous    levothyroxine tablet 137 mcg Daily    metoclopramide HCl injection 5 mg Q6H PRN    midodrine tablet 2.5 mg TID    oxyCODONE 5 mg/5 mL solution 10 mg TID    oxyCODONE 5 mg/5 mL solution 10 mg Q4H PRN    polyethylene glycol packet 17 g Daily    povidone-iodine 10 % ointment PRN    predniSONE tablet 7.5 mg Daily    sennosides 8.8 mg/5 ml syrup 5 mL QHS    sodium chloride 0.9% flush 3 mL Q8H    tacrolimus (PROGRAF) 1 mg/mL oral syringe BID       Objective:     Vital Signs (Most Recent):  Temp: 97.8 °F (36.6 °C) (04/09/18 1100)  Pulse: (!) 113 (04/09/18 1300)  Resp: 12 (04/09/18 1300)  BP: (!) 103/54 (04/09/18 1300)  SpO2: 100 % (04/09/18 1300)  O2 Device (Oxygen Therapy): tracheostomy collar (04/09/18  1300) Vital Signs (24h Range):  Temp:  [97.4 °F (36.3 °C)-98.1 °F (36.7 °C)] 97.8 °F (36.6 °C)  Pulse:  [108-120] 113  Resp:  [2-25] 12  SpO2:  [99 %-100 %] 100 %  BP: ()/(43-77) 103/54     Weight: 63.1 kg (139 lb 1.8 oz) (04/09/18 0701)  Body mass index is 21.79 kg/m².  Body surface area is 1.73 meters squared.    I/O last 3 completed shifts:  In: 3694.4 [I.V.:2529.4; Other:200; NG/GT:715; IV Piggyback:250]  Out: 4089 [Other:4089]    Physical Exam   Constitutional: He appears well-developed and well-nourished. No distress.   HENT:   Head: Normocephalic and atraumatic.   Eyes: Conjunctivae and EOM are normal.   Cardiovascular: Regular rhythm.  Tachycardia present.    Pulmonary/Chest: He has no wheezes. He has no rales.   Abdominal: Soft. He exhibits no distension.   Musculoskeletal: He exhibits edema. He exhibits no tenderness or deformity.   Neurological: He is alert.   Skin: Skin is warm and dry. He is not diaphoretic.       Significant Labs:  CBC:   Recent Labs  Lab 04/09/18  1012   WBC 2.88*   RBC 2.26*   HGB 7.1*   HCT 22.6*   *   *   MCH 31.4*   MCHC 31.4*     CMP:   Recent Labs  Lab 04/09/18  0311   *  141*  141*   CALCIUM 7.3*  7.3*  7.3*   ALBUMIN 2.1*  2.1*  2.1*   PROT 5.4*     137  137   K 4.4  4.4  4.4   CO2 30*  30*  30*     100  100   BUN 5*  5*  5*   CREATININE 0.5  0.5  0.5   ALKPHOS 183*   ALT 11   AST 28   BILITOT 0.9            Assessment/Plan:     Acute renal failure superimposed on stage 4 chronic kidney disease    - baseline sCr 2.6-3.0 consistent with CKD stage IV  - anuric CACHORRO; likely ischemic ATN from prolonged pre-renal state (diarrhea) in setting of prograf renal vasoconstriction; cannot r/o septic ATN or Prograf toxicity  - SLED started 3/14. TDC placed 4/4/18.   - remains anuric  - Blood pressure have been stable today   -will plan to hold RRT today.  Tentative plan for RRT tomorrow (HD vs. SLED).            Juaquin Ibrahim,  NP  Nephrology  [facility]  Pager:  [phone]        I have reviewed and concur with the NP's history, physical, assessment, and plan. I have personally interviewed and examined the patient at bedside.

## 2018-04-09 NOTE — ASSESSMENT & PLAN NOTE
Continue Lt4 137mcg per G tube    Ideally should hold TF 1 hr before and 1 hr after given LT4      Recheck TFTs in 4 weeks (around 5/6)

## 2018-04-09 NOTE — SUBJECTIVE & OBJECTIVE
Interval History:   SLED discontinued this morning after 12 hours with adequate metabolic clearance.  Net negative 800 ml/24h.  Oxygenating well on RA.  VSS.    Review of patient's allergies indicates:   Allergen Reactions    No known drug allergies      Current Facility-Administered Medications   Medication Frequency    0.9%  NaCl infusion (for blood administration) Q24H PRN    alprazolam ODT dissolvable tablet 0.5 mg TID PRN    chlorhexidine 0.12 % solution 15 mL BID    dextrose 50% injection 12.5 g PRN    dextrose 50% injection 25 g PRN    escitalopram oxalate tablet 20 mg Daily    famotidine tablet 20 mg QHS    glucagon (human recombinant) injection 1 mg PRN    glucose chewable tablet 16 g PRN    glucose chewable tablet 24 g PRN    heparin (porcine) injection 5,000 Units Q12H    insulin aspart U-100 pen 0-4 Units PRN    insulin regular (Humulin R) 100 Units in sodium chloride 0.9% 100 mL infusion Continuous    levothyroxine tablet 137 mcg Daily    metoclopramide HCl injection 5 mg Q6H PRN    midodrine tablet 2.5 mg TID    oxyCODONE 5 mg/5 mL solution 10 mg TID    oxyCODONE 5 mg/5 mL solution 10 mg Q4H PRN    polyethylene glycol packet 17 g Daily    povidone-iodine 10 % ointment PRN    predniSONE tablet 7.5 mg Daily    sennosides 8.8 mg/5 ml syrup 5 mL QHS    sodium chloride 0.9% flush 3 mL Q8H    tacrolimus (PROGRAF) 1 mg/mL oral syringe BID       Objective:     Vital Signs (Most Recent):  Temp: 97.8 °F (36.6 °C) (04/09/18 1100)  Pulse: (!) 113 (04/09/18 1300)  Resp: 12 (04/09/18 1300)  BP: (!) 103/54 (04/09/18 1300)  SpO2: 100 % (04/09/18 1300)  O2 Device (Oxygen Therapy): tracheostomy collar (04/09/18 1300) Vital Signs (24h Range):  Temp:  [97.4 °F (36.3 °C)-98.1 °F (36.7 °C)] 97.8 °F (36.6 °C)  Pulse:  [108-120] 113  Resp:  [2-25] 12  SpO2:  [99 %-100 %] 100 %  BP: ()/(43-77) 103/54     Weight: 63.1 kg (139 lb 1.8 oz) (04/09/18 0701)  Body mass index is 21.79 kg/m².  Body  surface area is 1.73 meters squared.    I/O last 3 completed shifts:  In: 3694.4 [I.V.:2529.4; Other:200; NG/GT:715; IV Piggyback:250]  Out: 4089 [Other:4089]    Physical Exam   Constitutional: He appears well-developed and well-nourished. No distress.   HENT:   Head: Normocephalic and atraumatic.   Eyes: Conjunctivae and EOM are normal.   Cardiovascular: Regular rhythm.  Tachycardia present.    Pulmonary/Chest: He has no wheezes. He has no rales.   Abdominal: Soft. He exhibits no distension.   Musculoskeletal: He exhibits edema. He exhibits no tenderness or deformity.   Neurological: He is alert.   Skin: Skin is warm and dry. He is not diaphoretic.       Significant Labs:  CBC:   Recent Labs  Lab 04/09/18  1012   WBC 2.88*   RBC 2.26*   HGB 7.1*   HCT 22.6*   *   *   MCH 31.4*   MCHC 31.4*     CMP:   Recent Labs  Lab 04/09/18  0311   *  141*  141*   CALCIUM 7.3*  7.3*  7.3*   ALBUMIN 2.1*  2.1*  2.1*   PROT 5.4*     137  137   K 4.4  4.4  4.4   CO2 30*  30*  30*     100  100   BUN 5*  5*  5*   CREATININE 0.5  0.5  0.5   ALKPHOS 183*   ALT 11   AST 28   BILITOT 0.9

## 2018-04-09 NOTE — PLAN OF CARE
Problem: Occupational Therapy Goal  Goal: Occupational Therapy Goal  Goals to be met by: 4/23/2018    Pt will follow 75% of motor commands with <2 verbal cues for repetition of task to maximize engagement in grooming task.  Pt will complete feeding with mod A.   Pt will complete supine with HOB slightly elevated to seated at EOB with mod A in prep for seated grooming task.  Pt will engage in BUE exercises in orders to increase strength to 3+/5 in BUE's to increase engagement in seated grooming task  Pt will sit at EOB for approx 10 minutes with mod A and unilateral UE support to complete grooming task  Pt will complete sit>stand from EOB with bed in lowest position with mod A in prep for [place] [facility]   Outcome: Ongoing (interventions implemented as appropriate)  Goals reviewed and continue to remain appropriate.

## 2018-04-09 NOTE — SUBJECTIVE & OBJECTIVE
Interval History: No complaints this AM. Vent overnight for rest. Tolerating trach collar well, wife at bedside. Continues to work with PT/OT.    Continuous Infusions:   insulin (HUMAN R) infusion (adults) 0.5 Units/hr (04/09/18 1400)     Scheduled Meds:   chlorhexidine  15 mL Mouth/Throat BID    escitalopram oxalate  20 mg Per G Tube Daily    famotidine  20 mg Per G Tube QHS    heparin (porcine)  5,000 Units Subcutaneous Q12H    levothyroxine  137 mcg Per G Tube Daily    midodrine  2.5 mg Oral TID    polyethylene glycol  17 g Oral Daily    predniSONE  7.5 mg Per G Tube Daily    sennosides 8.8 mg/5 ml  5 mL Oral QHS    sodium chloride 0.9%  3 mL Intravenous Q8H    tacrolimus  2 mg Per G Tube BID     PRN Meds:sodium chloride, alprazolam ODT, dextrose 50%, dextrose 50%, glucagon (human recombinant), glucose, glucose, insulin aspart U-100, metoclopramide HCl, oxyCODONE, povidone-iodine    Review of patient's allergies indicates:   Allergen Reactions    No known drug allergies      Objective:     Vital Signs (Most Recent):  Temp: 97.8 °F (36.6 °C) (04/09/18 1100)  Pulse: (!) 114 (04/09/18 1400)  Resp: 14 (04/09/18 1400)  BP: (!) 97/54 (04/09/18 1400)  SpO2: 100 % (04/09/18 1400) Vital Signs (24h Range):  Temp:  [97.4 °F (36.3 °C)-98.1 °F (36.7 °C)] 97.8 °F (36.6 °C)  Pulse:  [108-120] 114  Resp:  [2-16] 14  SpO2:  [99 %-100 %] 100 %  BP: ()/(43-71) 97/54     Patient Vitals for the past 72 hrs (Last 3 readings):   Weight   04/09/18 0701 63.1 kg (139 lb 1.8 oz)     Body mass index is 21.79 kg/m².      Intake/Output Summary (Last 24 hours) at 04/09/18 1435  Last data filed at 04/09/18 1400   Gross per 24 hour   Intake          3387.97 ml   Output             3864 ml   Net          -476.03 ml     Hemodynamic Parameters:    Physical Exam   Constitutional: He appears well-developed and well-nourished. No distress.   HENT:   Head: Normocephalic and atraumatic.   Eyes: EOM are normal. No scleral icterus.    Neck: Normal range of motion.   Trach in place    Cardiovascular: Regular rhythm.    tachycardic   Pulmonary/Chest: Effort normal. No respiratory distress.   Perm cath in place no signs of infection   Abdominal:   Soft, NT, previous chest tube scars noted  No distension, no fluid wave  g tube in place with green drainage, wound C/D/I   Musculoskeletal: He exhibits no edema or tenderness.   Neurological: He is alert.   Skin: Skin is warm and dry.     Significant Labs:  CBC:    Recent Labs  Lab 04/07/18 2010 04/09/18  0311 04/09/18  1012   WBC 3.68* 2.56* 2.88*   RBC 2.70* 2.23* 2.26*   HGB 8.4* 7.1* 7.1*   HCT 25.9* 21.9* 22.6*   * 107* 107*   MCV 96 98 100*   MCH 31.1* 31.8* 31.4*   MCHC 32.4 32.4 31.4*     BNP:  No results for input(s): BNP in the last 168 hours.    Invalid input(s): BNPTRIAGELBLO  CMP:    Recent Labs  Lab 04/07/18  0305  04/08/18 0318 04/08/18  1439 04/08/18  2204 04/09/18  0311   *  129*  < > 146*  146* 100  100 130*  130* 141*  141*  141*   CALCIUM 8.4*  8.4*  < > 8.7  8.7 8.4*  8.4* 7.4*  7.4* 7.3*  7.3*  7.3*   ALBUMIN 2.0*  2.0*  < > 2.1*  2.1* 2.2*  2.2* 2.1*  2.1* 2.1*  2.1*  2.1*   PROT 5.1*  --  5.5*  --   --  5.4*     139  < > 137  137 139  139 137  137 137  137  137   K 3.8  3.8  < > 4.8  4.8 4.7  4.7 4.6  4.6 4.4  4.4  4.4   CO2 30*  30*  < > 28  28 29  29 29  29 30*  30*  30*     102  < > 100  100 102  102 103  103 100  100  100   BUN 28*  28*  < > 53*  53* 37*  37* 13  13 5*  5*  5*   CREATININE 1.5*  1.5*  < > 2.5*  2.5* 1.8*  1.8* 0.8  0.8 0.5  0.5  0.5   ALKPHOS 150*  --  170*  --   --  183*   ALT 5*  --  7*  --   --  11   AST 17  --  21  --   --  28   BILITOT 0.7  --  0.7  --   --  0.9   < > = values in this interval not displayed.   Coagulation:   No results for input(s): PT, INR, APTT in the last 168 hours.  LDH:  No results for input(s): LDH in the last 72 hours.  Microbiology:  Microbiology  Results (last 7 days)     Procedure Component Value Units Date/Time    Fungus culture [[id number]] Collected:  03/28/18 1603    Order Status:  Completed Specimen:  Body Fluid from Lung, RLL Updated:  04/05/18 1118     Fungus (Mycology) Culture Culture in progress          I have reviewed all pertinent labs within the past 24 hours.    Estimated Creatinine Clearance: 168.3 mL/min (based on SCr of 0.5 mg/dL).    Diagnostic Results:  I have reviewed and interpreted all pertinent imaging results/findings within the past 24 hours.

## 2018-04-09 NOTE — PLAN OF CARE
Problem: SLP Goal  Goal: SLP Goal  Speech Language Pathology Goals  Goals expected to be met by 4/13/18    1.  Pt will tolerate PMSV for 3 min w/ no change in respiratory status and fair voicing produced.  2.  Pt will complete further evaluation of cognitive-linguistic communication skills to determine the need for additional goals.       Outcome: Ongoing (interventions implemented as appropriate)  Pt ongoing with goals. ST to continue to follow. See note for full details. Thank you.     JOSELYN Delarosa., [facility]-SLP  Speech-Language Pathology  Pager: [phone]

## 2018-04-09 NOTE — PROGRESS NOTES
Pt placed back on mechanical ventilation at documented settings per MD order. Pt tolerated well, will continue to monitor.

## 2018-04-09 NOTE — ASSESSMENT & PLAN NOTE
- baseline sCr 2.6-3.0 consistent with CKD stage IV  - anuric CACHORRO; likely ischemic ATN from prolonged pre-renal state (diarrhea) in setting of prograf renal vasoconstriction; cannot r/o septic ATN or Prograf toxicity  - SLED started 3/14. TDC placed 4/4/18.   - remains anuric  - Blood pressure have been stable today   -will plan to hold RRT today.  Tentative plan for RRT tomorrow (HD vs. SLED).

## 2018-04-09 NOTE — ASSESSMENT & PLAN NOTE
- Appreciate Nephrology recs.   - Intermittent CRRT. Added middorine for pressure support during CRRT in hopes of tolerating HD in future.  - permacath placed in OR

## 2018-04-09 NOTE — SUBJECTIVE & OBJECTIVE
"Interval HPI:   Overnight events:  AF, tachycardic  Alternating between trach and vent.  On PDN 7.5mg daily.    AM BG 250s, s/p correction insulin.  Insulin gtt at 0.3u/h.  On TF, noted at 10cc/h.  Held momentarily secondary to residual.    BP (!) 99/49   Pulse (!) 116   Temp 97.4 °F (36.3 °C) (Axillary)   Resp 12   Ht 5' 7" (1.702 m)   Wt 63.1 kg (139 lb 1.8 oz)   SpO2 100%   BMI 21.79 kg/m²     Labs Reviewed and Include      Recent Labs  Lab 04/09/18  0311   *  141*  141*   CALCIUM 7.3*  7.3*  7.3*   ALBUMIN 2.1*  2.1*  2.1*   PROT 5.4*     137  137   K 4.4  4.4  4.4   CO2 30*  30*  30*     100  100   BUN 5*  5*  5*   CREATININE 0.5  0.5  0.5   ALKPHOS 183*   ALT 11   AST 28   BILITOT 0.9     Lab Results   Component Value Date    WBC 2.56 (L) 04/09/2018    HGB 7.1 (L) 04/09/2018    HCT 21.9 (L) 04/09/2018    MCV 98 04/09/2018     (L) 04/09/2018     No results for input(s): TSH, FREET4 in the last 168 hours.  Lab Results   Component Value Date    HGBA1C 5.1 04/05/2018       Nutritional status:   Body mass index is 21.79 kg/m².  Lab Results   Component Value Date    ALBUMIN 2.1 (L) 04/09/2018    ALBUMIN 2.1 (L) 04/09/2018    ALBUMIN 2.1 (L) 04/09/2018     Lab Results   Component Value Date    PREALBUMIN 13 (L) 04/08/2018    PREALBUMIN 5 (L) 03/15/2018    PREALBUMIN 18 (L) 03/24/2015       Estimated Creatinine Clearance: 168.3 mL/min (based on SCr of 0.5 mg/dL).    Accu-Checks  Recent Labs      04/07/18   1603  04/07/18   2028  04/07/18   2346  04/08/18   0418  04/08/18   0931  04/08/18   1237  04/08/18   1532  04/08/18   2109  04/09/18   0010  04/09/18   0519   POCTGLUCOSE  156*  197*  182*  154*  124*  110  125*  142*  105  165*       Current Medications and/or Treatments Impacting Glycemic Control  Immunotherapy:  Immunosuppressants         Stop Route Frequency     tacrolimus (PROGRAF) 1 mg/mL oral syringe      -- PER G TUBE Daily     tacrolimus (PROGRAF) 1 mg/mL " oral syringe      -- PER G TUBE Daily        Steroids:   Hormones     Start     Stop Route Frequency Ordered    04/06/18 0900  predniSONE tablet 7.5 mg      -- PER G TUBE Daily 04/05/18 0926        Pressors:    Autonomic Drugs     None        Hyperglycemia/Diabetes Medications: Antihyperglycemics     Start     Stop Route Frequency Ordered    04/07/18 1116  insulin aspart U-100 pen 0-4 Units      -- SubQ As needed (PRN) 04/07/18 1017    04/07/18 1015  insulin regular (Humulin R) 100 Units in sodium chloride 0.9% 100 mL infusion      -- IV Continuous 04/07/18 1017

## 2018-04-09 NOTE — PROGRESS NOTES
"[facility]  Endocrinology  Progress Note    Admit Date: 3/11/2018     Reason for Consult: abnormal TFTs    Surgical Procedure and Date: s/p heart transplant 2014     Diabetes diagnosis year: 5yo (T1DM)     Home Diabetes Medications:    Levemir 15u qHS  Novolog 4u AC     How often checking glucose at home? 4 times daily   BG readings on regimen: 150-200s  Hypoglycemia on the regimen?  Yes, rarely - treats appropriately (light snack, glucose tab)  Missed doses on regimen?  No        Diabetes Complications include:     Hyperglycemia, Hypoglycemia  and Diabetic chronic kidney disease          Complicating diabetes co morbidities:   N/A     HPI:   Patient is a 42 y.o. male with a diagnosis of T1DM, HTN, hypothyroidism, s/p heart transplant.  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 He required serial paracentesis and thoracentesis until he underwent a VATS with pleurX catheter placement on 1/11/2018 and removed 2/7/18.       Presented to hospital secondary to diarrhea and cough.  Upon initial labs, TSH was decreased (0.101) and free T4 1.33.  Endocrinology consulted to assist in management of these labs.  He was last seen in clinic by Kamala Vázquez NP 11/2017.   Previous hospitalization, endocrine consulted for same problem.  During that visit, recommended decreasing LT4 to 125mcg daily. Unfortunately, patient was discharged on previous dose of 150mcg daily.     Interval HPI:   Overnight events:  AF, tachycardic  Alternating between trach and vent.  On PDN 7.5mg daily.    AM BG 250s, s/p correction insulin.  Insulin gtt at 0.3u/h.  On TF, noted at 10cc/h.  Held momentarily secondary to residual.    BP (!) 99/49   Pulse (!) 116   Temp 97.4 °F (36.3 °C) (Axillary)   Resp 12   Ht 5' 7" (1.702 m)   Wt 63.1 kg (139 lb 1.8 oz)   SpO2 100%   BMI 21.79 kg/m²       Labs Reviewed and Include  "     Recent Labs  Lab 04/09/18  0311   *  141*  141*   CALCIUM 7.3*  7.3*  7.3*   ALBUMIN 2.1*  2.1*  2.1*   PROT 5.4*     137  137   K 4.4  4.4  4.4   CO2 30*  30*  30*     100  100   BUN 5*  5*  5*   CREATININE 0.5  0.5  0.5   ALKPHOS 183*   ALT 11   AST 28   BILITOT 0.9     Lab Results   Component Value Date    WBC 2.56 (L) 04/09/2018    HGB 7.1 (L) 04/09/2018    HCT 21.9 (L) 04/09/2018    MCV 98 04/09/2018     (L) 04/09/2018     No results for input(s): TSH, FREET4 in the last 168 hours.  Lab Results   Component Value Date    HGBA1C 5.1 04/05/2018       Nutritional status:   Body mass index is 21.79 kg/m².  Lab Results   Component Value Date    ALBUMIN 2.1 (L) 04/09/2018    ALBUMIN 2.1 (L) 04/09/2018    ALBUMIN 2.1 (L) 04/09/2018     Lab Results   Component Value Date    PREALBUMIN 13 (L) 04/08/2018    PREALBUMIN 5 (L) 03/15/2018    PREALBUMIN 18 (L) 03/24/2015       Estimated Creatinine Clearance: 168.3 mL/min (based on SCr of 0.5 mg/dL).    Accu-Checks  Recent Labs      04/07/18   1603  04/07/18   2028  04/07/18   2346  04/08/18   0418  04/08/18   0931  04/08/18   1237  04/08/18   1532  04/08/18   2109  04/09/18   0010  04/09/18   0519   POCTGLUCOSE  156*  197*  182*  154*  124*  110  125*  142*  105  165*       Current Medications and/or Treatments Impacting Glycemic Control  Immunotherapy:  Immunosuppressants         Stop Route Frequency     tacrolimus (PROGRAF) 1 mg/mL oral syringe      -- PER G TUBE Daily     tacrolimus (PROGRAF) 1 mg/mL oral syringe      -- PER G TUBE Daily        Steroids:   Hormones     Start     Stop Route Frequency Ordered    04/06/18 0900  predniSONE tablet 7.5 mg      -- PER G TUBE Daily 04/05/18 0926        Pressors:    Autonomic Drugs     None        Hyperglycemia/Diabetes Medications: Antihyperglycemics     Start     Stop Route Frequency Ordered    04/07/18 1116  insulin aspart U-100 pen 0-4 Units      -- SubQ As needed (PRN) 04/07/18  1017    04/07/18 1015  insulin regular (Humulin R) 100 Units in sodium chloride 0.9% 100 mL infusion      -- IV Continuous 04/07/18 1017          ASSESSMENT and PLAN    On enteral nutrition    May increase insulin needs            Type 1 diabetes mellitus with stage 3 chronic kidney disease    BG goal 140-1180    Unclear of definitive plan for feeds.  Will continue IV at this time.  Adjusted transition to 0.5u/hr, low correction q4hr while on TF    Pt is T1DM, do not suspend insulin >1 hr   If TF held, decrease insulin rate to 0.2u/hr - known to have controlled BG on basal needs of lev 5 daily/ 0.2u/hr during this hospitalization     Discharge Recommendations:  TBD.        Hypothyroidism due to Hashimoto's thyroiditis    Continue Lt4 137mcg per G tube    Ideally should hold TF 1 hr before and 1 hr after given LT4      Recheck TFTs in 4 weeks (around 5/6)              Deonna Larry MD  Endocrinology  [facility]

## 2018-04-09 NOTE — NURSING
Rounds Report: Attended interdisciplinary rounds this morning with the transplant team including SW, physicians, fellows,  mid-level providers, and transplant coordinators.  Discussed plan of care, including DC in 2 weeks to LTAC in [place]. Pt unable to tolerate HD, started midodrine to help with BP. Pt on trach collar during day, rest on vent overnight.

## 2018-04-09 NOTE — ASSESSMENT & PLAN NOTE
- TTE 3/26/18 showed normal graft function but has known history of restrictive CM post transplant.  - Continue pred 7.5, Tacro per G tube 1/2   - Cellcept remains on hold  - CMV pending  - CVP 7 today, repeat CBC stable. CXR with stable right pleural effusion

## 2018-04-09 NOTE — ASSESSMENT & PLAN NOTE
- PT/OT daily   - This will take time and require adequate nutrition   - PEG tube placed, tolerating tube feeds well. Held overnight for residuals.

## 2018-04-09 NOTE — PT/OT/SLP PROGRESS
Occupational Therapy   Treatment    Name: Lv Crocker  MRN: [id number]  Admitting Diagnosis:  Acute respiratory failure with hypoxia  5 Days Post-Op    Recommendations:     Discharge Recommendations: LTACH (long term acute care hospital)  Discharge Equipment Recommendations:   (TBD)  Barriers to discharge:  Decreased caregiver support, Inaccessible home environment    Subjective     Communicated with: RN prior to session. Pt agreeable to OT treatment session.   Pain/Comfort:  · Pain Rating 1: 0/10  · Pain Rating Post-Intervention 1: 0/10    Patients cultural, spiritual, Yazidism conflicts given the current situation: none reported     Objective:     Patient found with: central line, pulse ox (continuous), oxygen, telemetry, tracheostomy, pressure relief boots, PEG Tube, peripheral IV    General Precautions: Standard, fall, respiratory, NPO   Orthopedic Precautions:N/A   Braces: N/A     Occupational Performance:    Bed Mobility:    · Patient completed Rolling/Turning to Left with  maximal assistance  · Patient completed Scooting/Bridging with maximal assistance  · Patient completed Supine to Sit with maximal assistance  · Patient completed Sit to Supine with total assistance     Patient left supine with all lines intact, call button in reach, RN notified and RN/spouse present    [facility] 6 Click:  [facility] Total Score: 6    Treatment & Education:  -Pt in supine at start of session with spouse in the room  -PEG feeding paused prior to functional mobility   -Pt requiring total A for bed mobility   -Pt seated at EOB for approx 15 minutes to allow for lung expansion with max A for sitting balance d/t significant posterior and L lateral lean with patient aware of lean  -Pt engaging in trunk control exercises, BLE exercises, and head control exercises while seated at EOB   -placing BUEs on table positioned in front with towel in place--pushing forward and back while engaging core muscles to anteriorly lean and regain  neutral trunk   -BLE AROM knee flexion/extension and ankle pumps 1x15 reps   -neck flexion and extension with min verbal cues to maintain head in neutral positioning   -Pt unable to lift BUE's with therapist assistance required to place hands onto bed into weight-bearing position   -Pt's abdomen leaking clear fluid at end of session with pt return to supine with total A and RN in room to change dressing and gown    Additional:  Communicated role of OT/POC  Updated communication board  RN notified post-OT session  Education:    Assessment:     Lv Crocker is a 42 y.o. male with a medical diagnosis of Acute respiratory failure with hypoxia. Performance deficits affecting function are weakness, impaired endurance, impaired self care skills, impaired functional mobilty, impaired balance, decreased upper extremity function, decreased lower extremity function, decreased ROM, impaired skin, decreased safety awareness.  Pt with improved head control with ability to attain neutral position, contraction of muscles in LLE to assist with clearing off EOB, and trunk strength to shift trunk anteriorly while seated at EOB with assistance. Pt following all commands throughout session with ability to mouth responses to questions appropriately. Pt seated at EOB for approx 15 minutes with max A for sitting balance. Pt continues to p/w generalized weakness, impaired functional activity tolerance, impaired trunk control, impaired sitting balance, impaired head control, and decreased ability to engage in ADLs.     Rehab Prognosis:  good; patient would benefit from acute skilled OT services to address these deficits and reach maximum level of function.       Plan:     Patient to be seen 5 x/week to address the above listed problems via self-care/home management, therapeutic activities, therapeutic exercises  · Plan of Care Expires: 05/06/18  · Plan of Care Reviewed with: patient, spouse    This Plan of care has been discussed  with the patient who was involved in its development and understands and is in agreement with the identified goals and treatment plan    GOALS:    Occupational Therapy Goals        Problem: Occupational Therapy Goal    Goal Priority Disciplines Outcome Interventions   Occupational Therapy Goal     OT, PT/OT Ongoing (interventions implemented as appropriate)    Description:  Goals to be met by: 4/23/2018    Pt will follow 75% of motor commands with <2 verbal cues for repetition of task to maximize engagement in grooming task.  Pt will complete feeding with mod A.   Pt will complete supine with HOB slightly elevated to seated at EOB with mod A in prep for seated grooming task.  Pt will engage in BUE exercises in orders to increase strength to 3+/5 in BUE's to increase engagement in seated grooming task  Pt will sit at EOB for approx 10 minutes with mod A and unilateral UE support to complete grooming task  Pt will complete sit>stand from EOB with bed in lowest position with mod A in prep for transfer to [facility]                     Time Tracking:     OT Date of Treatment: 04/09/18  OT Start Time: 1003  OT Stop Time: 1034  OT Total Time (min): 31 min    Billable Minutes:Therapeutic Activity 31 minutes    Moriah Villar OT  4/9/2018

## 2018-04-09 NOTE — PT/OT/SLP PROGRESS
"Speech Language Pathology Treatment    Patient Name:  Lv Crocker   MRN:  [id number]  Admitting Diagnosis: Acute respiratory failure with hypoxia    Recommendations:                 General Recommendations:  Cognitive-linguistic evaluation and One Way Speaking Valve  Diet recommendations:  NPO, Liquid Diet Level: NPO   Aspiration Precautions: Continue alternate means of nutrition   General Precautions: Standard, fall, respiratory, NPO  Communication strategies:  pt using mouthing to communicate wants/needs   PMSV Precautions: Do not sleep with valve on, trials with ST only at this time, wash with soap and water  Subjective     SLP reviewed pt with nurse pre/post session  Pt presents calm, cooperative  He mouth's "I can't breath" when wearing valve  Pt denies pain    Pain/Comfort:  · Pain Rating Post-Intervention 1: 0/10    Objective:     Has the patient been evaluated by SLP for swallowing?   No  Keep patient NPO? Yes   Current Respiratory Status: trach with cuff inflated      Pt seen for ongoing PMSV training s/p trach. Pt found awake and alert, on trach collar with cuff deflated. Family at bedside. Pt and S/O educated on SLP role, PMSV technology, PMSV safety precautions and SLP POC. Pt with partial demonstration of understanding and is accepting of PMSV trials this service day. Pt with SpO2 between 99%-100% prior to placement of PMSV. Pt tolerates trials of PMSV for ~ 20 seconds then again upon second attempt for approximately ~15 seconds. Pt with increased anxiety and explaining he felt like he couldn''t breath upon both attempts. Pt aphonic upon both attempts.   SLP unable to elicit sustained phonation or throat clear upon command.  Pt with SpO2 remaining between % t/o trials. Pt educated on ongoing SLP POC and continued assessment pending possibility of trach downsize. No further questions. Whiteboard current. SLP  reviewed findings with Pt's nurse following session.     Assessment:     Lv" Tee Crocker is a 42 y.o. male with an SLP diagnosis of S/P Trach and One Way Speaking Valve Training.  He presents with minimal tolerance of PMSV valve this service day.   ST to continue to follow.     Goals:    SLP Goals        Problem: SLP Goal    Goal Priority Disciplines Outcome   SLP Goal     SLP Ongoing (interventions implemented as appropriate)   Description:  Speech Language Pathology Goals  Goals expected to be met by 4/13/18    1.  Pt will tolerate PMSV for 3 min w/ no change in respiratory status and fair voicing produced.  2.  Pt will complete further evaluation of cognitive-linguistic communication skills to determine the need for additional goals.                        Plan:     · Patient to be seen:  5 x/week   · Plan of Care expires:  05/05/18  · Plan of Care reviewed with:  patient   · SLP Follow-Up:  Yes       Discharge recommendations:  LTACH (long term acute care hospital)   Barriers to Discharge:  None    Time Tracking:     SLP Treatment Date:   04/09/18  Speech Start Time:  1422  Speech Stop Time:  1446     Speech Total Time (min):  24 min    Billable Minutes: Therapeutic Speech Device 24    RAO Delarosa, [facility]-SLP  Speech-Language Pathology  Pager: [phone]    04/09/2018

## 2018-04-09 NOTE — ASSESSMENT & PLAN NOTE
BG goal 140-1180    Unclear of definitive plan for feeds.  Will continue IV at this time.  Adjusted transition to 0.5u/hr, low correction q4hr while on TF    Pt is T1DM, do not suspend insulin >1 hr   If TF held, decrease insulin rate to 0.2u/hr - known to have controlled BG on basal needs of lev 5 daily/ 0.2u/hr during this hospitalization     Discharge Recommendations:  TBD.

## 2018-04-09 NOTE — PLAN OF CARE
"Problem: Fall Risk (Adult)  Intervention: Patient Rounds  Past Medical History:   Diagnosis Date    Arthritis     Ascites     Thought to be 2/2 heart tpx; liver bx 3/31/17 w/o significant fibrosis    Cardiomyopathy     Depression     Controlled "for the most part" on Lexipro    Encounter for blood transfusion     during transplant    GERD (gastroesophageal reflux disease)     Hashimoto's disease     History of CHF (congestive heart failure)     Previously EF 20% (prior to heart transplant); last EF 60%, PAP 41 as of 12/12/17; throught to be 2/2 genetics & DM1    History of coronary artery disease     H/o Coronary artery disease; resolved since heart transplant 2014    History of myocardial infarction     h/o MI x3 prior to heart transplant    HLD (hyperlipidemia) 6/11/2015    Hypoglycemia unawareness in type 1 diabetes mellitus     Hypothyroid     Initially hyperthyroid 2/2 Hoshimoto's thyroiditis; now on levothyroxine 150 mcg qd    Pleural effusion due to another disorder     Thought to be 2/2 heart tpx; s/p PleuRx catheter placement and removal after 1-2 months    Pulmonary hypertension assoc with unclear multi-factorial mechanisms 12/12/2017    PAP 41 (EF 60%) on ECHO 12/12/17    Syncope 6/30/2015    Type I diabetes mellitus, well controlled     Well controlled; Dx'd @7y/o; on N insulin 20U BID & Humalog 10U w/meals +SSI; Glu 89 11/9/17; A1c 7.2% 4/5/17    Unspecified essential hypertension 05/29/2014    Well controlled; Last /57 on 11/9/17           Problem: Patient Care Overview  Goal: Plan of Care Review  Outcome: Ongoing (interventions implemented as appropriate)  Insulin gtt. TF @ 40. No acute events throughout day, VS and assessment per flow sheet, patient progressing towards goals as tolerated. Plan is to continue OT/PT with plans for LTAC in 2 weeks. Plan of care reviewed with Lv Crocker and family, all concerns addressed, will continue to monitor.        "

## 2018-04-10 NOTE — SUBJECTIVE & OBJECTIVE
Interval History:   SLED discontinued yesterday morning, electrolytes remain stable today with CVP of 10.  Net positive 1.2L/24h.  Oxygenating well on Trach collar @ 28% FiO2.      Review of patient's allergies indicates:   Allergen Reactions    No known drug allergies      Current Facility-Administered Medications   Medication Frequency    0.9%  NaCl infusion (for blood administration) Q24H PRN    alprazolam ODT dissolvable tablet 0.5 mg TID PRN    chlorhexidine 0.12 % solution 15 mL BID    dextrose 50% injection 12.5 g PRN    dextrose 50% injection 25 g PRN    escitalopram oxalate tablet 20 mg Daily    famotidine tablet 20 mg QHS    glucagon (human recombinant) injection 1 mg PRN    glucose chewable tablet 16 g PRN    glucose chewable tablet 24 g PRN    heparin (porcine) injection 5,000 Units Q12H    insulin aspart U-100 pen 0-4 Units PRN    insulin regular (Humulin R) 100 Units in sodium chloride 0.9% 100 mL infusion Continuous    levothyroxine tablet 137 mcg Daily    metoclopramide HCl injection 5 mg Q6H PRN    midodrine tablet 2.5 mg TID    oxyCODONE 5 mg/5 mL solution 10 mg Q4H PRN    polyethylene glycol packet 17 g Daily    povidone-iodine 10 % ointment PRN    predniSONE tablet 7.5 mg Daily    sennosides 8.8 mg/5 ml syrup 5 mL QHS    sodium chloride 0.9% flush 3 mL Q8H    tacrolimus (PROGRAF) 1 mg/mL oral syringe BID       Objective:     Vital Signs (Most Recent):  Temp: 97.8 °F (36.6 °C) (04/10/18 0701)  Pulse: (!) 119 (04/10/18 1129)  Resp: 14 (04/10/18 1129)  BP: 106/64 (04/10/18 1000)  SpO2: 100 % (04/10/18 1129)  O2 Device (Oxygen Therapy): Trach Collar (04/10/18 1129) Vital Signs (24h Range):  Temp:  [97.8 °F (36.6 °C)-98 °F (36.7 °C)] 97.8 °F (36.6 °C)  Pulse:  [110-125] 119  Resp:  [5-31] 14  SpO2:  [99 %-100 %] 100 %  BP: ()/(42-68) 106/64     Weight: 63.1 kg (139 lb 1.8 oz) (04/10/18 1000)  Body mass index is 21.79 kg/m².  Body surface area is 1.73 meters  squared.    I/O last 3 completed shifts:  In: 3421.3 [I.V.:1691.3; NG/GT:1480; IV Piggyback:250]  Out: 2819 [Other:2819]    Physical Exam   Constitutional: He appears well-developed and well-nourished. No distress.   HENT:   Head: Normocephalic and atraumatic.   Eyes: Conjunctivae and EOM are normal.   Cardiovascular: Regular rhythm.  Tachycardia present.    Pulmonary/Chest: He has no wheezes. He has no rales.   Abdominal: Soft. He exhibits no distension.   Musculoskeletal: He exhibits edema. He exhibits no tenderness or deformity.   Neurological: He is alert.   Skin: Skin is warm and dry. He is not diaphoretic.       Significant Labs:  CBC:   Recent Labs  Lab 04/10/18  0409   WBC 3.65*   RBC 2.16*   HGB 6.8*   HCT 21.4*   *   MCV 99*   MCH 31.5*   MCHC 31.8*     CMP:   Recent Labs  Lab 04/10/18  0409   *   CALCIUM 8.5*   ALBUMIN 2.2*   PROT 5.3*   *   K 4.6   CO2 27   CL 97   BUN 26*   CREATININE 1.7*   ALKPHOS 183*   ALT 11   AST 20   BILITOT 0.7

## 2018-04-10 NOTE — PROGRESS NOTES
[facility]  Heart Transplant  Progress Note    Patient Name: Lv Crocker  MRN: [id number]  Admission Date: 3/11/2018  Hospital Length of Stay: 29 days  Attending Physician: Kanika Ferreira MD  Primary Care Provider: Philippe Mohr MD  Principal Problem:Acute respiratory failure with hypoxia    Subjective:     Interval History: No complaints this AM. Vent overnight for rest. Tolerating trach collar well, wife at bedside. Continues to work with PT/OT.    Continuous Infusions:   insulin (HUMAN R) infusion (adults) 0.5 Units/hr (04/09/18 1400)     Scheduled Meds:   chlorhexidine  15 mL Mouth/Throat BID    escitalopram oxalate  20 mg Per G Tube Daily    famotidine  20 mg Per G Tube QHS    heparin (porcine)  5,000 Units Subcutaneous Q12H    levothyroxine  137 mcg Per G Tube Daily    midodrine  2.5 mg Oral TID    polyethylene glycol  17 g Oral Daily    predniSONE  7.5 mg Per G Tube Daily    sennosides 8.8 mg/5 ml  5 mL Oral QHS    sodium chloride 0.9%  3 mL Intravenous Q8H    tacrolimus  2 mg Per G Tube BID     PRN Meds:sodium chloride, alprazolam ODT, dextrose 50%, dextrose 50%, glucagon (human recombinant), glucose, glucose, insulin aspart U-100, metoclopramide HCl, oxyCODONE, povidone-iodine    Review of patient's allergies indicates:   Allergen Reactions    No known drug allergies      Objective:     Vital Signs (Most Recent):  Temp: 97.8 °F (36.6 °C) (04/09/18 1100)  Pulse: (!) 114 (04/09/18 1400)  Resp: 14 (04/09/18 1400)  BP: (!) 97/54 (04/09/18 1400)  SpO2: 100 % (04/09/18 1400) Vital Signs (24h Range):  Temp:  [97.4 °F (36.3 °C)-98.1 °F (36.7 °C)] 97.8 °F (36.6 °C)  Pulse:  [108-120] 114  Resp:  [2-16] 14  SpO2:  [99 %-100 %] 100 %  BP: ()/(43-71) 97/54     Patient Vitals for the past 72 hrs (Last 3 readings):   Weight   04/09/18 0701 63.1 kg (139 lb 1.8 oz)     Body mass index is 21.79 kg/m².      Intake/Output Summary (Last 24 hours) at 04/09/18 5425  Last data  filed at 04/09/18 1400   Gross per 24 hour   Intake          3387.97 ml   Output             3864 ml   Net          -476.03 ml     Hemodynamic Parameters:    Physical Exam   Constitutional: He appears well-developed and well-nourished. No distress.   HENT:   Head: Normocephalic and atraumatic.   Eyes: EOM are normal. No scleral icterus.   Neck: Normal range of motion.   Trach in place    Cardiovascular: Regular rhythm.    tachycardic   Pulmonary/Chest: Effort normal. No respiratory distress.   Perm cath in place no signs of infection   Abdominal:   Soft, NT, previous chest tube scars noted  No distension, no fluid wave  g tube in place with green drainage, wound C/D/I   Musculoskeletal: He exhibits no edema or tenderness.   Neurological: He is alert.   Skin: Skin is warm and dry.     Significant Labs:  CBC:    Recent Labs  Lab 04/07/18 2010 04/09/18 0311 04/09/18  1012   WBC 3.68* 2.56* 2.88*   RBC 2.70* 2.23* 2.26*   HGB 8.4* 7.1* 7.1*   HCT 25.9* 21.9* 22.6*   * 107* 107*   MCV 96 98 100*   MCH 31.1* 31.8* 31.4*   MCHC 32.4 32.4 31.4*     BNP:  No results for input(s): BNP in the last 168 hours.    Invalid input(s): BNPTRIAGELBLO  CMP:    Recent Labs  Lab 04/07/18  0305  04/08/18  0318 04/08/18  1439 04/08/18  2204 04/09/18  0311   *  129*  < > 146*  146* 100  100 130*  130* 141*  141*  141*   CALCIUM 8.4*  8.4*  < > 8.7  8.7 8.4*  8.4* 7.4*  7.4* 7.3*  7.3*  7.3*   ALBUMIN 2.0*  2.0*  < > 2.1*  2.1* 2.2*  2.2* 2.1*  2.1* 2.1*  2.1*  2.1*   PROT 5.1*  --  5.5*  --   --  5.4*     139  < > 137  137 139  139 137  137 137  137  137   K 3.8  3.8  < > 4.8  4.8 4.7  4.7 4.6  4.6 4.4  4.4  4.4   CO2 30*  30*  < > 28  28 29  29 29  29 30*  30*  30*     102  < > 100  100 102  102 103  103 100  100  100   BUN 28*  28*  < > 53*  53* 37*  37* 13  13 5*  5*  5*   CREATININE 1.5*  1.5*  < > 2.5*  2.5* 1.8*  1.8* 0.8  0.8 0.5  0.5  0.5   ALKPHOS  150*  --  170*  --   --  183*   ALT 5*  --  7*  --   --  11   AST 17  --  21  --   --  28   BILITOT 0.7  --  0.7  --   --  0.9   < > = values in this interval not displayed.   Coagulation:   No results for input(s): PT, INR, APTT in the last 168 hours.  LDH:  No results for input(s): LDH in the last 72 hours.  Microbiology:  Microbiology Results (last 7 days)     Procedure Component Value Units Date/Time    Fungus culture [[id number]] Collected:  03/28/18 1607    Order Status:  Completed Specimen:  Body Fluid from Lung, RLL Updated:  04/05/18 1118     Fungus (Mycology) Culture Culture in progress          I have reviewed all pertinent labs within the past 24 hours.    Estimated Creatinine Clearance: 168.3 mL/min (based on SCr of 0.5 mg/dL).    Diagnostic Results:  I have reviewed and interpreted all pertinent imaging results/findings within the past 24 hours.    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 Most recently has had ongoing issues with his lungs, had gotten 2 thoracenteses, after which he underwent VATS 01/19/18 followed by pleurex catheter placement and effusion drainage 01/11/18, subsequently had it removed 02/07/18 after drainage had decreased despite multiple attempts to drain it. Has been having significant coughing fits that result in him being near-syncopal at times, although difficult to say if he has passed out or not- patient most recently was admitted to the hospital for increased AGUILAR, coughing and pre-syncope 02/11-02/14/18 [place] [facility] [place]   Patient was started on antibiotics for suspected bacterial infection, with some diarrhea, bronchitis, and possible pneumonia. Ct chest showed some pleural fluid collection and after talking with Dr. James, we arranged for him to receive a diagnostic tap with IR, from  which the fluid collection demonstrated no growth or significant findings at all really. Cell counts do not appear to have been sent but will recheck. Patient states since his hospital stay, yesterday had a significant coughing fit again, after which he nearly passed out, is being seen in clinic today for this. Denies any cardiopulmonary complaints, no leg swelling, has some abdominal swelling, which is moderate for him. Denies significant shortness of breath with mild exertion, had 6MWT yesterday to see if he qualified for home O2, and his O2 sats actually improved after recovery from where he started initially. He and his wife admit he is in bed most days, not very active.     Mr. Crocker presented to the ED 3/11/18 with approximately 3 weeks of worsening diarrhea and 2-3 days of sinus pressure, drainage, and worsened cough.  He notes that he had a coughing fit last night and passed out, coughed throughout most of the night.  This also happened on 2/25/18.  He last saw Dr Ferreira in clinic on 2/20/18 where he was taken off myfortic and switched to cellcept (he hadn't been on cellcept because of leukopenia when they tried post-transplant).  Though his diarrhea had improved after his last discharge, he states it has increased now to 15x/day, clear/yellow/green, no fevers, no exacerbating foods per say.  He was taken back off the cellcept and placed back on myfortic this past week and has not noticed immediate change in diarrhea. He also reports his baseline coughing fits havent improved and are minimally responsive to tessalon pearls.  Came on last night, he lost consciousness during a fit once which is relatively normal for him, and had two more during HPI.  He notes no sick contacts but does state he's had some URI symptoms as above.  His baseline vitals are usually -120 and BP 90s/60s-110s/70s.  He reports a history of intermittent diarrhea - did have Cdiff many years ago but this smells different.  No  abdominal pain, no nausea, no vomiting.  No blood in diarrhea.  Appears last c-scope in 2015 with no evidence of infection or inflammatory processes.  He does report IBS which is different that this.       * Acute respiratory failure with hypoxia    - S/P Bronch and intubation 3/14 now post tracheostomy due to inability to extubate   - Pulmonology following to help manage vent wean. Appreciate Recs   - RSV positive early in hospitalization. Completed course of Ribavarin/IVIG. Per lung transplant protocol for severe RSV (given myelosuppression).   - ID following. Completed 7 day course of Meropenem/Linezolid.   - All cultures remain negative.   - trach collar today, so far tolerating well  - Appreciate PT/OT/Wound care.        Restrictive cardiomyopathy    - Has known history of recurrent ascites and pleural effusions   - S/P thoracentesis X 2, followed by VATS/pleurex cath placement (January 2018) with removal of pleurex (February 2018) and 3rd thoracentesis 2/14/18 with no significant findings on fluid removal.        Type 1 diabetes mellitus with stage 3 chronic kidney disease    - Appreciate Endocrine's recs  - Insulin gtt per endocrine recs  - Recent blood transfusions will likely affect A1cs (will appear lower than they likely are)         Hypothyroidism due to Hashimoto's thyroiditis    - Appreciate Endocrine's recs  - Continue IV levothyroxine 88mcg        Acute renal failure superimposed on stage 4 chronic kidney disease    - Appreciate Nephrology recs.   - Intermittent CRRT. Added middorine for pressure support during CRRT in hopes of tolerating HD in future.  - permacath placed in OR        Anemia    - transfused one unit 4/4        Heart transplanted    - TTE 3/26/18 showed normal graft function but has known history of restrictive CM post transplant.  - Continue pred 7.5, Tacro per G tube 1/2   - Cellcept remains on hold  - CMV pending  - CVP 7 today, repeat CBC stable. CXR with stable right pleural  effusion        Debility    - PT/OT daily   - This will take time and require adequate nutrition   - PEG tube placed, tolerating tube feeds well. Held overnight for residuals.        Anxiety    - Pt on nortriptyline/escitalopram at home.  - Continue prn low dose xanax ODT.             JACOBY DeyC  Heart Transplant  [facility]

## 2018-04-10 NOTE — ASSESSMENT & PLAN NOTE
- PT/OT daily   - This will take time and require adequate nutrition   - PEG tube placed, tolerating tube feeds well. Nutrition following.

## 2018-04-10 NOTE — ASSESSMENT & PLAN NOTE
- TTE 3/26/18 showed normal graft function but has known history of restrictive CM post transplant.  - Continue pred 7.5, Tacro per G tube 3/3   - Cellcept remains on hold  - CMV pending  - CVP 10 today

## 2018-04-10 NOTE — PLAN OF CARE
"Problem: Fall Risk (Adult)  Intervention: Patient Rounds  Past Medical History:   Diagnosis Date    Arthritis     Ascites     Thought to be 2/2 heart tpx; liver bx 3/31/17 w/o significant fibrosis    Cardiomyopathy     Depression     Controlled "for the most part" on Lexipro    Encounter for blood transfusion     during transplant    GERD (gastroesophageal reflux disease)     Hashimoto's disease     History of CHF (congestive heart failure)     Previously EF 20% (prior to heart transplant); last EF 60%, PAP 41 as of 12/12/17; throught to be 2/2 genetics & DM1    History of coronary artery disease     H/o Coronary artery disease; resolved since heart transplant 2014    History of myocardial infarction     h/o MI x3 prior to heart transplant    HLD (hyperlipidemia) 6/11/2015    Hypoglycemia unawareness in type 1 diabetes mellitus     Hypothyroid     Initially hyperthyroid 2/2 Hoshimoto's thyroiditis; now on levothyroxine 150 mcg qd    Pleural effusion due to another disorder     Thought to be 2/2 heart tpx; s/p PleuRx catheter placement and removal after 1-2 months    Pulmonary hypertension assoc with unclear multi-factorial mechanisms 12/12/2017    PAP 41 (EF 60%) on ECHO 12/12/17    Syncope 6/30/2015    Type I diabetes mellitus, well controlled     Well controlled; Dx'd @5y/o; on N insulin 20U BID & Humalog 10U w/meals +SSI; Glu 89 11/9/17; A1c 7.2% 4/5/17    Unspecified essential hypertension 05/29/2014    Well controlled; Last /57 on 11/9/17           Problem: Patient Care Overview  Goal: Plan of Care Review  Outcome: Ongoing (interventions implemented as appropriate)  Insulin gtt. CVP 10 this am. No acute events throughout day, VS and assessment per flow sheet, patient progressing towards goals as tolerated. Plan is CRRT tomorrow with LTAC plans in 2 weeks. Plan of care reviewed with Lv Crocker and family, all concerns addressed, will continue to monitor.        "

## 2018-04-10 NOTE — PROGRESS NOTES
"[facility]  Endocrinology  Progress Note    Admit Date: 3/11/2018     Reason for Consult: abnormal TFTs    Surgical Procedure and Date: s/p heart transplant 2014     Diabetes diagnosis year: 7yo (T1DM)     Home Diabetes Medications:    Levemir 15u qHS  Novolog 4u AC     How often checking glucose at home? 4 times daily   BG readings on regimen: 150-200s  Hypoglycemia on the regimen?  Yes, rarely - treats appropriately (light snack, glucose tab)  Missed doses on regimen?  No        Diabetes Complications include:     Hyperglycemia, Hypoglycemia  and Diabetic chronic kidney disease          Complicating diabetes co morbidities:   N/A     HPI:   Patient is a 42 y.o. male with a diagnosis of T1DM, HTN, hypothyroidism, s/p heart transplant.  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 He required serial paracentesis and thoracentesis until he underwent a VATS with pleurX catheter placement on 1/11/2018 and removed 2/7/18.       Presented to hospital secondary to diarrhea and cough.  Upon initial labs, TSH was decreased (0.101) and free T4 1.33.  Endocrinology consulted to assist in management of these labs.  He was last seen in clinic by Kamala Vázquez NP 11/2017.   Previous hospitalization, endocrine consulted for same problem.  During that visit, recommended decreasing LT4 to 125mcg daily. Unfortunately, patient was discharged on previous dose of 150mcg daily.     Interval HPI:   Overnight events:  AF, tachycardic, low normal BP  Alternating between trach and vent  On PDN 7.5mg daily    AM .  Received 4u correction  Insulin gtt at 0.5u/h, still with global elevation  TF at goal 50cc/h.  Minimal residual (today 60cc).   Tolerating well. No nausea.     BP (!) 89/53 (BP Location: Left arm, Patient Position: Lying)   Pulse (!) 112   Temp 97.8 °F (36.6 °C) (Oral)   Resp 12   Ht 5' 7" (1.702 " m)   Wt 63.1 kg (139 lb 1.8 oz)   SpO2 100%   BMI 21.79 kg/m²       Labs Reviewed and Include      Recent Labs  Lab 04/10/18  0409   *   CALCIUM 8.5*   ALBUMIN 2.2*   PROT 5.3*   *   K 4.6   CO2 27   CL 97   BUN 26*   CREATININE 1.7*   ALKPHOS 183*   ALT 11   AST 20   BILITOT 0.7     Lab Results   Component Value Date    WBC 3.65 (L) 04/10/2018    HGB 6.8 (L) 04/10/2018    HCT 21.4 (L) 04/10/2018    MCV 99 (H) 04/10/2018     (L) 04/10/2018     No results for input(s): TSH, FREET4 in the last 168 hours.  Lab Results   Component Value Date    HGBA1C 5.1 04/05/2018       Nutritional status:   Body mass index is 21.79 kg/m².  Lab Results   Component Value Date    ALBUMIN 2.2 (L) 04/10/2018    ALBUMIN 2.1 (L) 04/09/2018    ALBUMIN 2.1 (L) 04/09/2018    ALBUMIN 2.1 (L) 04/09/2018     Lab Results   Component Value Date    PREALBUMIN 13 (L) 04/08/2018    PREALBUMIN 5 (L) 03/15/2018    PREALBUMIN 18 (L) 03/24/2015       Estimated Creatinine Clearance: 49.5 mL/min (A) (based on SCr of 1.7 mg/dL (H)).    Accu-Checks  Recent Labs      04/08/18   1532  04/08/18   2109  04/09/18   0010  04/09/18   0519  04/09/18   0857  04/09/18   1241  04/09/18   1731  04/09/18   2029  04/10/18   0011  04/10/18   0412   POCTGLUCOSE  125*  142*  105  165*  292*  236*  310*  362*  298*  196*       Current Medications and/or Treatments Impacting Glycemic Control  Immunotherapy:  Immunosuppressants         Stop Route Frequency     tacrolimus (PROGRAF) 1 mg/mL oral syringe      -- PER G TUBE 2 times daily        Steroids:   Hormones     Start     Stop Route Frequency Ordered    04/06/18 0900  predniSONE tablet 7.5 mg      -- PER G TUBE Daily 04/05/18 0926        Pressors:    Autonomic Drugs     Start     Stop Route Frequency Ordered    04/09/18 1500  midodrine tablet 2.5 mg      -- Oral 3 times daily 04/09/18 1006        Hyperglycemia/Diabetes Medications: Antihyperglycemics     Start     Stop Route Frequency Ordered     04/07/18 1116  insulin aspart U-100 pen 0-4 Units      -- SubQ As needed (PRN) 04/07/18 1017    04/07/18 1015  insulin regular (Humulin R) 100 Units in sodium chloride 0.9% 100 mL infusion      -- IV Continuous 04/07/18 1017          ASSESSMENT and PLAN    * Acute respiratory failure with hypoxia    Per primary  S/p trach.  Practicing with speech valve.  Remains NPO        Type 1 diabetes mellitus with stage 3 chronic kidney disease    BG goal 140-1180     Globally elevated yesterday.  Increased transition to 0.8 u/hr, low correction q4hr while on TF     Pt is T1DM, do not suspend insulin >1 hr   If TF held, decrease insulin rate to 0.2u/hr - known to have controlled BG on basal needs of lev 5 daily/ 0.2u/hr during this hospitalization        Discharge Recommendations:  TBD.        On enteral nutrition    May increase insulin needs            Hypothyroidism due to Hashimoto's thyroiditis    Continue Lt4 137mcg per G tube    Ideally should hold TF 1 hr before and 1 hr after given LT4      Recheck TFTs in 4 weeks (around 5/6)              Deonna Larry MD  Endocrinology  [facility]

## 2018-04-10 NOTE — ASSESSMENT & PLAN NOTE
- Has known history of recurrent ascites and pleural effusions   - S/P thoracentesis X 2, followed by VATS/pleurex cath placement (January 2018) with removal of pleurex (February 2018) and 3rd thoracentesis 2/14/18 with no significant findings on fluid removal.  - CXR from 4/9 stable from prior

## 2018-04-10 NOTE — PT/OT/SLP PROGRESS
"Speech Language Pathology Treatment    Patient Name:  Lv Crocker   MRN:  [id number]  Admitting Diagnosis: Acute respiratory failure with hypoxia    Recommendations:                 General Recommendations:  Cognitive-linguistic evaluation and One Way Speaking Valve  Diet recommendations:  NPO, Liquid Diet Level: NPO   Aspiration Precautions: Continue alternate means of nutrition   General Precautions: Standard, fall, respiratory, NPO  Communication strategies:  provide increased time to answer and pt using mouthing to communicate wants/needs. SLP reviewed need for sip/puff call light 2/2 decreased UE mobility     Subjective     SLP reviews Pt with nurse  Pt presents calm and cooperative  He uses mouthing to ask SLp, "What about a bite of ice cream?"  Patient's spouse asks, "If he can wear that [P<SV} can he eat?"    Pain/Comfort:  · Pain Rating 1: 0/10  · Pain Rating Post-Intervention 1: 0/10    Objective:     Has the patient been evaluated by SLP for swallowing?   No  Keep patient NPO? Yes   Current Respiratory Status: trach with cuff deflated      Pt seen for ongoing PMSV training s/p trach.  Patient found awake in bed, Shiley 8.0 cuff deflated with trach collar.  Pt's spouse at bedside.  Pt requesting oral care. Oral care provided via suction toothette.  Pt agreeable to trials of PMSV.  Pt with Sp02 at 100% prior to trial. SLP assists in placing valve. Pt aphonic across SLP attempts to elicit phonation, cough, throat clear or sigh.  Pt asking for SLP to remove ~20 seconds into trial.  No S/S respiratory distress noted and SLP provides redirection and encouragement for an additional 5 seconds.  Patient continues to ask if Valve can be removed and SLP removes valve ~ 25 seconds following placement.   SLP reviews PMSV one-way technology, safety/cleaning precautions, possibility of ongoing PMSV trials pending trach downsize, and ongoing SLP POC with Patient and spouse.  Patient asking SLP questions about " swallowing and expressing he wants to eat ice cream. SLP reviews anatomy s/p trach, swallow anatomy, and role of PMSV in swallow assessment. Pt verbalizes partial understanding.  Patient's spouse asking about communication options for call light 2/2 decreased use of UE mobility.  No further questions.  Whiteboard current. Following session, SLP reviews possible of sip/puff call light for Pt with nurse. SLP reviews feasibility for trach downsize pending vent weaning with MD team later service day.     Assessment:     Lv Crocker is a 42 y.o. male with an SLP diagnosis of S/P Trach and One Way Speaking Valve Training.  He presents aphonic when wearing valve briefly today.  SLP reviewed findings with MD team and possibility of trach downsize.     Goals:    SLP Goals        Problem: SLP Goal    Goal Priority Disciplines Outcome   SLP Goal     SLP Ongoing (interventions implemented as appropriate)   Description:  Speech Language Pathology Goals  Goals expected to be met by 4/13/18    1.  Pt will tolerate PMSV for 3 min w/ no change in respiratory status and fair voicing produced.  2.  Pt will complete further evaluation of cognitive-linguistic communication skills to determine the need for additional goals.                        Plan:     · Patient to be seen:  5 x/week   · Plan of Care expires:  05/05/18  · Plan of Care reviewed with:  patient   · SLP Follow-Up:  Yes       Discharge recommendations:  LTACH (long term acute care hospital)   Barriers to Discharge:  None    Time Tracking:     SLP Treatment Date:   04/10/18  Speech Start Time:  1221  Speech Stop Time:  1245     Speech Total Time (min):  24 min    Billable Minutes: Therapeutic Speech Device 24  RAO Delarosa, [facility]-SLP  Speech-Language Pathology  Pager: [phone]      04/10/2018

## 2018-04-10 NOTE — ASSESSMENT & PLAN NOTE
BG goal [id number]     Globally elevated yesterday.  Increased transition to 0.8 u/hr, low correction q4hr while on TF     Pt is T1DM, do not suspend insulin >1 hr   If TF held, decrease insulin rate to 0.2u/hr - known to have controlled BG on basal needs of lev 5 daily/ 0.2u/hr during this hospitalization        Discharge Recommendations:  TBD.

## 2018-04-10 NOTE — ASSESSMENT & PLAN NOTE
- S/P Bronch and intubation 3/14 now post tracheostomy due to inability to extubate   - Pulmonology no long following as patient tolerating trach collar well  - RSV positive early in hospitalization. Completed course of Ribavarin/IVIG. Per lung transplant protocol for severe RSV (given myelosuppression).   - ID following. Completed 7 day course of Meropenem/Linezolid.   - All cultures remain negative.   - Appreciate PT/OT/Wound care.

## 2018-04-10 NOTE — ASSESSMENT & PLAN NOTE
- Appreciate Nephrology recs.   - Intermittent CRRT. Added middorine for pressure support during CRRT in hopes of tolerating HD in future.   - plan to try HD vs SLED tomorrow morning. On midodrine and will adjust ti give midodrine 30 minutes prior to HD tomorrow.  - permacath placed in OR

## 2018-04-10 NOTE — PROGRESS NOTES
"  [facility]  Adult Nutrition  Consult Note    SUMMARY     Recommendations    1. Increase TF rate (of Glucerna 1.5) to 60 mL/hr to meet 104% EEN, 100% EPN.    - Hold for residuals >500 mL; additional fluid per MD.   2. RD to monitor & follow-up.    Goals: Meet % EEN, EPN  Nutrition Goal Status: goal met  Communication of RD Recs: reviewed with RN    Reason for Assessment    Reason for Assessment: RD follow-up  Diagnosis: other (see comments) (Resp. fx)  Relevant Medical History: Hashimoto's disease, HTN, HLD, DM, CHF, Heart tx (2014)  Interdisciplinary Rounds: did not attend    General Information Comments: S/p trach/PEG placement, tolerating current TF regimen. ST recommends pt remain NPO.  Nutrition Discharge Planning: Tolerance of TF    Nutrition/Diet History    Patient Reported Diet/Restrictions/Preferences: other (see comments) (HAO)  Do you have any cultural, spiritual, Mormon conflicts, given your current situation?: none reported   Factors Affecting Nutritional Intake: NPO    Anthropometrics    Temp: 97.8 °F (36.6 °C)  Height Method: Stated  Height: 5' 7" (170.2 cm)  Height (inches): 67 in  Weight Method: Bed Scale  Weight: 63.1 kg (139 lb 1.8 oz)  Weight (lb): 139.11 lb  Ideal Body Weight (IBW), Male: 148 lb  % Ideal Body Weight, Male (lb): 93.99 lb  BMI (Calculated): 21.8  BMI Grade: 18.5-24.9 - normal  Usual Body Weight (UBW), kg:  (HAO)    Lab/Procedures/Meds    Pertinent Labs Reviewed: reviewed  Pertinent Labs Comments: Na 135, BUN 26, Creat 1.7, GFR 48, Gluc 216  Pertinent Medications Reviewed: reviewed  Pertinent Medications Comments: Insulin    Physical Findings/Assessment    Overall Physical Appearance: underweight, other (see comments) (On trach collar)  Tubes: gastrostomy tube  Oral/Mouth Cavity: WDL  Skin: incision(s)    Estimated/Assessed Needs    Weight Used For Calorie Calculations: 81 kg (178 lb 9.2 oz) (Dosing wt)  Energy Calorie Requirements (kcal): 2072 " kcal/d  Energy Need Method: [place] (1.25 PAL)     Protein Requirements:  g/d (1.2-1.5 g/kg)  Weight Used For Protein Calculations: 81 kg (178 lb 9.2 oz)     Fluid Need Method: other (see comments) (Per MD or 1 mL/kcal)     CHO Requirement: 50% total kcals    Nutrition Prescription Ordered    Current Diet Order: NPO    Current Nutrition Support Formula Ordered: Glucerna 1.5  Current Nutrition Support Rate Ordered: 50 mL/hr    Evaluation of Received Nutrient/Fluid Intake    Enteral Calories (kcal): 1800  Enteral Protein (gm): 99  Enteral (Free Water) Fluid (mL): 911    % Kcal Needs: 87%  % Protein Needs: 100%    Energy Calories Required: not meeting needs  Protein Required: meeting needs  Fluid Required: other (see comments) (Per MD or 1 mL/kcal)    Tolerance: tolerating    Nutrition Risk    Level of Risk/Frequency of Follow-up: moderate     Assessment and Plan    * Acute respiratory failure with hypoxia      Nutrition Problem  Inadequate energy intake    Related to (etiology):   Inability to consume sufficient energy    Signs and Symptoms (as evidenced by):   NPO with no alternate means of nutrition     Nutrition Diagnosis Status:   Resolved         Monitor and Evaluation    Food and Nutrient Intake: enteral nutrition intake  Food and Nutrient Adminstration: enteral and parenteral nutrition administration  Physical Activity and Function: nutrition-related ADLs and IADLs  Anthropometric Measurements: weight, weight change  Biochemical Data, Medical Tests and Procedures: lipid profile, inflammatory profile, glucose/endocrine profile, gastrointestinal profile, electrolyte and renal panel  Nutrition-Focused Physical Findings: overall appearance     Nutrition Follow-Up    RD Follow-up?: Yes

## 2018-04-10 NOTE — SUBJECTIVE & OBJECTIVE
Interval History: No complaints this AM. Will try to remain off vent tonight. Working with speech. In good spirits with wife at bedside today.    Continuous Infusions:   insulin (HUMAN R) infusion (adults) 0.8 Units/hr (04/10/18 1300)     Scheduled Meds:   chlorhexidine  15 mL Mouth/Throat BID    escitalopram oxalate  20 mg Per G Tube Daily    famotidine  20 mg Per G Tube QHS    heparin (porcine)  5,000 Units Subcutaneous Q12H    levothyroxine  137 mcg Per G Tube Daily    midodrine  2.5 mg Oral TID    polyethylene glycol  17 g Oral Daily    predniSONE  7.5 mg Per G Tube Daily    sennosides 8.8 mg/5 ml  5 mL Oral QHS    sodium chloride 0.9%  3 mL Intravenous Q8H    tacrolimus  3 mg Per G Tube BID     PRN Meds:sodium chloride, alprazolam ODT, dextrose 50%, dextrose 50%, glucagon (human recombinant), glucose, glucose, insulin aspart U-100, metoclopramide HCl, oxyCODONE, povidone-iodine    Review of patient's allergies indicates:   Allergen Reactions    No known drug allergies      Objective:     Vital Signs (Most Recent):  Temp: 97.8 °F (36.6 °C) (04/10/18 1100)  Pulse: (!) 116 (04/10/18 1300)  Resp: 15 (04/10/18 1300)  BP: (!) 102/58 (04/10/18 1300)  SpO2: 100 % (04/10/18 1300) Vital Signs (24h Range):  Temp:  [97.8 °F (36.6 °C)-98 °F (36.7 °C)] 97.8 °F (36.6 °C)  Pulse:  [110-125] 116  Resp:  [5-20] 15  SpO2:  [99 %-100 %] 100 %  BP: ()/(42-68) 102/58     Patient Vitals for the past 72 hrs (Last 3 readings):   Weight   04/10/18 1000 63.1 kg (139 lb 1.8 oz)   04/10/18 0701 63.1 kg (139 lb 1.8 oz)   04/09/18 0701 63.1 kg (139 lb 1.8 oz)     Body mass index is 21.79 kg/m².      Intake/Output Summary (Last 24 hours) at 04/10/18 1406  Last data filed at 04/10/18 1300   Gross per 24 hour   Intake          1332.69 ml   Output                0 ml   Net          1332.69 ml     Hemodynamic Parameters:    Physical Exam   Constitutional: He appears well-developed and well-nourished. No distress.   HENT:    Head: Normocephalic and atraumatic.   Eyes: EOM are normal. No scleral icterus.   Neck: Normal range of motion.   Trach in place    Cardiovascular: Regular rhythm.    tachycardic   Pulmonary/Chest: Effort normal. No respiratory distress.   Perm cath in place no signs of infection   Abdominal:   Soft, NT, previous chest tube scars noted  No distension, no fluid wave  g tube in place with green drainage, wound C/D/I   Musculoskeletal: He exhibits no edema or tenderness.   Neurological: He is alert.   Skin: Skin is warm and dry.     Significant Labs:  CBC:    Recent Labs  Lab 04/09/18  0311 04/09/18  1012 04/10/18  0409   WBC 2.56* 2.88* 3.65*   RBC 2.23* 2.26* 2.16*   HGB 7.1* 7.1* 6.8*   HCT 21.9* 22.6* 21.4*   * 107* 133*   MCV 98 100* 99*   MCH 31.8* 31.4* 31.5*   MCHC 32.4 31.4* 31.8*     BNP:  No results for input(s): BNP in the last 168 hours.    Invalid input(s): BNPTRIAGELBLO  CMP:    Recent Labs  Lab 04/08/18 0318 04/08/18  2204 04/09/18  0311 04/10/18  0409   *  146*  < > 130*  130* 141*  141*  141* 216*   CALCIUM 8.7  8.7  < > 7.4*  7.4* 7.3*  7.3*  7.3* 8.5*   ALBUMIN 2.1*  2.1*  < > 2.1*  2.1* 2.1*  2.1*  2.1* 2.2*   PROT 5.5*  --   --  5.4* 5.3*     137  < > 137  137 137  137  137 135*   K 4.8  4.8  < > 4.6  4.6 4.4  4.4  4.4 4.6   CO2 28  28  < > 29  29 30*  30*  30* 27     100  < > 103  103 100  100  100 97   BUN 53*  53*  < > 13  13 5*  5*  5* 26*   CREATININE 2.5*  2.5*  < > 0.8  0.8 0.5  0.5  0.5 1.7*   ALKPHOS 170*  --   --  183* 183*   ALT 7*  --   --  11 11   AST 21  --   --  28 20   BILITOT 0.7  --   --  0.9 0.7   < > = values in this interval not displayed.   Coagulation:   No results for input(s): PT, INR, APTT in the last 168 hours.  LDH:  No results for input(s): LDH in the last 72 hours.  Microbiology:  Microbiology Results (last 7 days)     Procedure Component Value Units Date/Time    Fungus culture [[id number]] Collected:   03/28/18 1607    Order Status:  Completed Specimen:  Body Fluid from Lung, RLL Updated:  04/05/18 1118     Fungus (Mycology) Culture Culture in progress          I have reviewed all pertinent labs within the past 24 hours.    Estimated Creatinine Clearance: 49.5 mL/min (A) (based on SCr of 1.7 mg/dL (H)).    Diagnostic Results:  I have reviewed and interpreted all pertinent imaging results/findings within the past 24 hours.

## 2018-04-10 NOTE — PLAN OF CARE
Problem: SLP Goal  Goal: SLP Goal  Speech Language Pathology Goals  Goals expected to be met by 4/13/18    1.  Pt will tolerate PMSV for 3 min w/ no change in respiratory status and fair voicing produced.  2.  Pt will complete further evaluation of cognitive-linguistic communication skills to determine the need for additional goals.       Outcome: Ongoing (interventions implemented as appropriate)  Pt with minimal tolerance of PMSV trials and remains aphonic while donning valve for brief trials. SLP reviewed findings with RT and MD team, and possibility of trach downsize pending weaning from vent. ST to continue to follow. Thank you.     JOSELYN Delarosa., [facility]-SLP  Speech-Language Pathology  Pager: [phone]  4/10/2018

## 2018-04-10 NOTE — PROGRESS NOTES
[facility]  Heart Transplant  Progress Note    Patient Name: Lv Crocker  MRN: [id number]  Admission Date: 3/11/2018  Hospital Length of Stay: 29 days  Attending Physician: Kanika Ferreira MD  Primary Care Provider: Philippe Mohr MD  Principal Problem:Acute respiratory failure with hypoxia    Subjective:     Interval History: No complaints this AM. Will try to remain off vent tonight. Working with speech. In good spirits with wife at bedside today.    Continuous Infusions:   insulin (HUMAN R) infusion (adults) 0.8 Units/hr (04/10/18 1300)     Scheduled Meds:   chlorhexidine  15 mL Mouth/Throat BID    escitalopram oxalate  20 mg Per G Tube Daily    famotidine  20 mg Per G Tube QHS    heparin (porcine)  5,000 Units Subcutaneous Q12H    levothyroxine  137 mcg Per G Tube Daily    midodrine  2.5 mg Oral TID    polyethylene glycol  17 g Oral Daily    predniSONE  7.5 mg Per G Tube Daily    sennosides 8.8 mg/5 ml  5 mL Oral QHS    sodium chloride 0.9%  3 mL Intravenous Q8H    tacrolimus  3 mg Per G Tube BID     PRN Meds:sodium chloride, alprazolam ODT, dextrose 50%, dextrose 50%, glucagon (human recombinant), glucose, glucose, insulin aspart U-100, metoclopramide HCl, oxyCODONE, povidone-iodine    Review of patient's allergies indicates:   Allergen Reactions    No known drug allergies      Objective:     Vital Signs (Most Recent):  Temp: 97.8 °F (36.6 °C) (04/10/18 1100)  Pulse: (!) 116 (04/10/18 1300)  Resp: 15 (04/10/18 1300)  BP: (!) 102/58 (04/10/18 1300)  SpO2: 100 % (04/10/18 1300) Vital Signs (24h Range):  Temp:  [97.8 °F (36.6 °C)-98 °F (36.7 °C)] 97.8 °F (36.6 °C)  Pulse:  [110-125] 116  Resp:  [5-20] 15  SpO2:  [99 %-100 %] 100 %  BP: ()/(42-68) 102/58     Patient Vitals for the past 72 hrs (Last 3 readings):   Weight   04/10/18 1000 63.1 kg (139 lb 1.8 oz)   04/10/18 0701 63.1 kg (139 lb 1.8 oz)   04/09/18 0701 63.1 kg (139 lb 1.8 oz)     Body mass index is 21.79  kg/m².      Intake/Output Summary (Last 24 hours) at 04/10/18 1406  Last data filed at 04/10/18 1300   Gross per 24 hour   Intake          1332.69 ml   Output                0 ml   Net          1332.69 ml     Hemodynamic Parameters:    Physical Exam   Constitutional: He appears well-developed and well-nourished. No distress.   HENT:   Head: Normocephalic and atraumatic.   Eyes: EOM are normal. No scleral icterus.   Neck: Normal range of motion.   Trach in place    Cardiovascular: Regular rhythm.    tachycardic   Pulmonary/Chest: Effort normal. No respiratory distress.   Perm cath in place no signs of infection   Abdominal:   Soft, NT, previous chest tube scars noted  No distension, no fluid wave  g tube in place with green drainage, wound C/D/I   Musculoskeletal: He exhibits no edema or tenderness.   Neurological: He is alert.   Skin: Skin is warm and dry.     Significant Labs:  CBC:    Recent Labs  Lab 04/09/18  0311 04/09/18  1012 04/10/18  0409   WBC 2.56* 2.88* 3.65*   RBC 2.23* 2.26* 2.16*   HGB 7.1* 7.1* 6.8*   HCT 21.9* 22.6* 21.4*   * 107* 133*   MCV 98 100* 99*   MCH 31.8* 31.4* 31.5*   MCHC 32.4 31.4* 31.8*     BNP:  No results for input(s): BNP in the last 168 hours.    Invalid input(s): BNPTRIAGELBLO  CMP:    Recent Labs  Lab 04/08/18  0318  04/08/18  2204 04/09/18  0311 04/10/18  0409   *  146*  < > 130*  130* 141*  141*  141* 216*   CALCIUM 8.7  8.7  < > 7.4*  7.4* 7.3*  7.3*  7.3* 8.5*   ALBUMIN 2.1*  2.1*  < > 2.1*  2.1* 2.1*  2.1*  2.1* 2.2*   PROT 5.5*  --   --  5.4* 5.3*     137  < > 137  137 137  137  137 135*   K 4.8  4.8  < > 4.6  4.6 4.4  4.4  4.4 4.6   CO2 28  28  < > 29  29 30*  30*  30* 27     100  < > 103  103 100  100  100 97   BUN 53*  53*  < > 13  13 5*  5*  5* 26*   CREATININE 2.5*  2.5*  < > 0.8  0.8 0.5  0.5  0.5 1.7*   ALKPHOS 170*  --   --  183* 183*   ALT 7*  --   --  11 11   AST 21  --   --  28 20   BILITOT 0.7  --    --  0.9 0.7   < > = values in this interval not displayed.   Coagulation:   No results for input(s): PT, INR, APTT in the last 168 hours.  LDH:  No results for input(s): LDH in the last 72 hours.  Microbiology:  Microbiology Results (last 7 days)     Procedure Component Value Units Date/Time    Fungus culture [[id number]] Collected:  03/28/18 1603    Order Status:  Completed Specimen:  Body Fluid from Lung, RLL Updated:  04/05/18 1118     Fungus (Mycology) Culture Culture in progress          I have reviewed all pertinent labs within the past 24 hours.    Estimated Creatinine Clearance: 49.5 mL/min (A) (based on SCr of 1.7 mg/dL (H)).    Diagnostic Results:  I have reviewed and interpreted all pertinent imaging results/findings within the past 24 hours.    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 Most recently has had ongoing issues with his lungs, had gotten 2 thoracenteses, after which he underwent VATS 01/19/18 followed by pleurex catheter placement and effusion drainage 01/11/18, subsequently had it removed 02/07/18 after drainage had decreased despite multiple attempts to drain it. Has been having significant coughing fits that result in him being near-syncopal at times, although difficult to say if he has passed out or not- patient most recently was admitted to the hospital for increased AGUILAR, coughing and pre-syncope 02/11-02/14/18 at [facility] [place].   Patient was started on antibiotics for suspected bacterial infection, with some diarrhea, bronchitis, and possible pneumonia. Ct chest showed some pleural fluid collection and after talking with Dr. James, we arranged for him to receive a diagnostic tap with IR, from which the fluid collection demonstrated no growth or significant findings at all really. Cell counts do not  appear to have been sent but will recheck. Patient states since his hospital stay, yesterday had a significant coughing fit again, after which he nearly passed out, is being seen in clinic today for this. Denies any cardiopulmonary complaints, no leg swelling, has some abdominal swelling, which is moderate for him. Denies significant shortness of breath with mild exertion, had 6MWT yesterday to see if he qualified for home O2, and his O2 sats actually improved after recovery from where he started initially. He and his wife admit he is in bed most days, not very active.     Mr. Crocker presented to the ED 3/11/18 with approximately 3 weeks of worsening diarrhea and 2-3 days of sinus pressure, drainage, and worsened cough.  He notes that he had a coughing fit last night and passed out, coughed throughout most of the night.  This also happened on 2/25/18.  He last saw Dr Ferreira in clinic on 2/20/18 where he was taken off myfortic and switched to cellcept (he hadn't been on cellcept because of leukopenia when they tried post-transplant).  Though his diarrhea had improved after his last discharge, he states it has increased now to 15x/day, clear/yellow/green, no fevers, no exacerbating foods per say.  He was taken back off the cellcept and placed back on myfortic this past week and has not noticed immediate change in diarrhea. He also reports his baseline coughing fits havent improved and are minimally responsive to tessalon pearls.  Came on last night, he lost consciousness during a fit once which is relatively normal for him, and had two more during HPI.  He notes no sick contacts but does state he's had some URI symptoms as above.  His baseline vitals are usually -120 and BP 90s/60s-110s/70s.  He reports a history of intermittent diarrhea - did have Cdiff many years ago but this smells different.  No abdominal pain, no nausea, no vomiting.  No blood in diarrhea.  Appears last c-scope in 2015 with no evidence of  infection or inflammatory processes.  He does report IBS which is different that this.       * Acute respiratory failure with hypoxia    - S/P Bronch and intubation 3/14 now post tracheostomy due to inability to extubate   - Pulmonology no long following as patient tolerating trach collar well  - RSV positive early in hospitalization. Completed course of Ribavarin/IVIG. Per lung transplant protocol for severe RSV (given myelosuppression).   - ID following. Completed 7 day course of Meropenem/Linezolid.   - All cultures remain negative.   - Appreciate PT/OT/Wound care.        Restrictive cardiomyopathy    - Has known history of recurrent ascites and pleural effusions   - S/P thoracentesis X 2, followed by VATS/pleurex cath placement (January 2018) with removal of pleurex (February 2018) and 3rd thoracentesis 2/14/18 with no significant findings on fluid removal.  - CXR from 4/9 stable from prior        Type 1 diabetes mellitus with stage 3 chronic kidney disease    - Appreciate Endocrine's recs  - Insulin gtt per endocrine recs  - Recent blood transfusions will likely affect A1cs (will appear lower than they likely are)         Hypothyroidism due to Hashimoto's thyroiditis    - Appreciate Endocrine's recs  - Continue IV levothyroxine 88mcg        Acute renal failure superimposed on stage 4 chronic kidney disease    - Appreciate Nephrology recs.   - Intermittent CRRT. Added middorine for pressure support during CRRT in hopes of tolerating HD in future.   - plan to try HD vs SLED tomorrow morning. On midodrine and will adjust ti give midodrine 30 minutes prior to HD tomorrow.  - permacath placed in OR        Anemia    - transfused one unit 4/4  - per nephrology, ok to resume procit        Heart transplanted    - TTE 3/26/18 showed normal graft function but has known history of restrictive CM post transplant.  - Continue pred 7.5, [place] per G tube 3/3   - Cellcept remains on hold  - CMV pending  - CVP 10 today         Debility    - PT/OT daily   - This will take time and require adequate nutrition   - PEG tube placed, tolerating tube feeds well. Nutrition following.        Anxiety    - Pt on nortriptyline/escitalopram at home.  - Continue prn low dose xanax ODT.             JACOBY DeyC  Heart Transplant  [facility]

## 2018-04-10 NOTE — PROGRESS NOTES
[facility]  Nephrology  Progress Note    Patient Name: Lv Crocker  MRN: [id number]  Admission Date: 3/11/2018  Hospital Length of Stay: 29 days  Attending Provider: Kanika Ferreira MD   Primary Care Physician: Philippe Mohr MD  Principal Problem:Acute respiratory failure with hypoxia    Subjective:     HPI: Mr. Crocker is a 41 yo WM with T1DM, Hashimoto thyroiditis, h/o OHTx 11/2014, and CKD stage 4 who was admitted on 3/11/18 for persistent diarrhea. He reported a 3 week history of diarrhea up to 15x/day. Associated symptoms including URI symptoms, coughing fits, and coughing syncope. Prograf level was 14.3. His post-heart transplant course has been complicated by [facility]R 4/2015, CMV, post-transplant restrictive cardiomyopathy, recurrent pleural effusions/ascites requiring monthly thoracenteses/paracenteses, VATS 1/11/18 with Pleur-X catheter (now removed), and CKD stage 4 with baseline sCr 2.6-3.0. Baseline -120s and baseline BP 90s-110s/60-70s. Upon admission he was started on IVF and diarrhea workup was initiated. On 3/12 he was noted to have decreased UOP despite fluids; NS was increased to 100cc/hr. He was scheduled for a colonoscopy on 3/13 however procedure was cancelled due to hypoxia and fever. He was transferred to the ICU for closer monitoring. He continued to have oliguria overnight. Bladder scan revealed 200cc of urine but patient refused osullivan catheter placement. Wife reports that patient always has a hard time urinating again after osullivan catheters are placed/removed so he refuses them. He remained hypoxic despite FiO2 70% and ABG revealed combined respiratory and metabolic acidosis with pH 7.17. He was started on BiPAP as well as a bicarb infusion at 50cc/hr. ABG with mild improvement. AM labs revealed K 6.2 and he was shifted and given kayexalate.Trialysis catheter was placed this morning and he subsequently developed hypotension and was started on levophed. He was  intubated later this morning. Nephrology consulted for CACHORRO. All history obtained by primary team and chart review as patient was intubated/sedated on exam. Consent for dialysis obtained by patient's wife and placed in chart. Of note, both of patient's parents were on dialysis.     Interval History:   SLED discontinued yesterday morning, electrolytes remain stable today with CVP of 10.  Net positive 1.2L/24h.  Oxygenating well on Trach collar @ 28% FiO2.      Review of patient's allergies indicates:   Allergen Reactions    No known drug allergies      Current Facility-Administered Medications   Medication Frequency    0.9%  NaCl infusion (for blood administration) Q24H PRN    alprazolam ODT dissolvable tablet 0.5 mg TID PRN    chlorhexidine 0.12 % solution 15 mL BID    dextrose 50% injection 12.5 g PRN    dextrose 50% injection 25 g PRN    escitalopram oxalate tablet 20 mg Daily    famotidine tablet 20 mg QHS    glucagon (human recombinant) injection 1 mg PRN    glucose chewable tablet 16 g PRN    glucose chewable tablet 24 g PRN    heparin (porcine) injection 5,000 Units Q12H    insulin aspart U-100 pen 0-4 Units PRN    insulin regular (Humulin R) 100 Units in sodium chloride 0.9% 100 mL infusion Continuous    levothyroxine tablet 137 mcg Daily    metoclopramide HCl injection 5 mg Q6H PRN    midodrine tablet 2.5 mg TID    oxyCODONE 5 mg/5 mL solution 10 mg Q4H PRN    polyethylene glycol packet 17 g Daily    povidone-iodine 10 % ointment PRN    predniSONE tablet 7.5 mg Daily    sennosides 8.8 mg/5 ml syrup 5 mL QHS    sodium chloride 0.9% flush 3 mL Q8H    tacrolimus (PROGRAF) 1 mg/mL oral syringe BID       Objective:     Vital Signs (Most Recent):  Temp: 97.8 °F (36.6 °C) (04/10/18 0701)  Pulse: (!) 119 (04/10/18 1129)  Resp: 14 (04/10/18 1129)  BP: 106/64 (04/10/18 1000)  SpO2: 100 % (04/10/18 1129)  O2 Device (Oxygen Therapy): Trach Collar (04/10/18 1129) Vital Signs (24h Range):  Temp:   [97.8 °F (36.6 °C)-98 °F (36.7 °C)] 97.8 °F (36.6 °C)  Pulse:  [110-125] 119  Resp:  [5-31] 14  SpO2:  [99 %-100 %] 100 %  BP: ()/(42-68) 106/64     Weight: 63.1 kg (139 lb 1.8 oz) (04/10/18 1000)  Body mass index is 21.79 kg/m².  Body surface area is 1.73 meters squared.    I/O last 3 completed shifts:  In: 3421.3 [I.V.:1691.3; NG/GT:1480; IV Piggyback:250]  Out: 2819 [Other:2819]    Physical Exam   Constitutional: He appears well-developed and well-nourished. No distress.   HENT:   Head: Normocephalic and atraumatic.   Eyes: Conjunctivae and EOM are normal.   Cardiovascular: Regular rhythm.  Tachycardia present.    Pulmonary/Chest: He has no wheezes. He has no rales.   Abdominal: Soft. He exhibits no distension.   Musculoskeletal: He exhibits edema. He exhibits no tenderness or deformity.   Neurological: He is alert.   Skin: Skin is warm and dry. He is not diaphoretic.       Significant Labs:  CBC:   Recent Labs  Lab 04/10/18  0409   WBC 3.65*   RBC 2.16*   HGB 6.8*   HCT 21.4*   *   MCV 99*   MCH 31.5*   MCHC 31.8*     CMP:   Recent Labs  Lab 04/10/18  0409   *   CALCIUM 8.5*   ALBUMIN 2.2*   PROT 5.3*   *   K 4.6   CO2 27   CL 97   BUN 26*   CREATININE 1.7*   ALKPHOS 183*   ALT 11   AST 20   BILITOT 0.7          Assessment/Plan:     Acute renal failure superimposed on stage 4 chronic kidney disease    - baseline sCr 2.6-3.0 consistent with CKD stage IV  - anuric CACHORRO; likely ischemic ATN from prolonged pre-renal state (diarrhea) in setting of prograf renal vasoconstriction; cannot r/o septic ATN or Prograf toxicity  - SLED started 3/14. TDC placed 4/4/18.   - remains anuric  - Blood pressure have been stable today   -CVP of 10 with stable electrolytes.    Plan:  -no indication for RRT today. Will reassess in AM for HD vs. SLED needs.  Tentative plan to transition to iHD in anticipation for discharge in the next few weeks.          Juaquin Ibrahim NP  Nephrology  [facility]  Pager:  [phone]        I have reviewed and concur with the NP's history, physical, assessment, and plan. I have personally interviewed and examined the patient at bedside.

## 2018-04-10 NOTE — SUBJECTIVE & OBJECTIVE
"Interval HPI:   Overnight events:  AF, tachycardic, low normal BP  Alternating between trach and vent  On PDN 7.5mg daily    AM .  Received 4u correction  Insulin gtt at 0.5u/h, still with global elevation  TF at goal 50cc/h.  Minimal residual (today 60cc).   Tolerating well. No nausea.     BP (!) 89/53 (BP Location: Left arm, Patient Position: Lying)   Pulse (!) 112   Temp 97.8 °F (36.6 °C) (Oral)   Resp 12   Ht 5' 7" (1.702 m)   Wt 63.1 kg (139 lb 1.8 oz)   SpO2 100%   BMI 21.79 kg/m²     Labs Reviewed and Include      Recent Labs  Lab 04/10/18  0409   *   CALCIUM 8.5*   ALBUMIN 2.2*   PROT 5.3*   *   K 4.6   CO2 27   CL 97   BUN 26*   CREATININE 1.7*   ALKPHOS 183*   ALT 11   AST 20   BILITOT 0.7     Lab Results   Component Value Date    WBC 3.65 (L) 04/10/2018    HGB 6.8 (L) 04/10/2018    HCT 21.4 (L) 04/10/2018    MCV 99 (H) 04/10/2018     (L) 04/10/2018     No results for input(s): TSH, FREET4 in the last 168 hours.  Lab Results   Component Value Date    HGBA1C 5.1 04/05/2018       Nutritional status:   Body mass index is 21.79 kg/m².  Lab Results   Component Value Date    ALBUMIN 2.2 (L) 04/10/2018    ALBUMIN 2.1 (L) 04/09/2018    ALBUMIN 2.1 (L) 04/09/2018    ALBUMIN 2.1 (L) 04/09/2018     Lab Results   Component Value Date    PREALBUMIN 13 (L) 04/08/2018    PREALBUMIN 5 (L) 03/15/2018    PREALBUMIN 18 (L) 03/24/2015       Estimated Creatinine Clearance: 49.5 mL/min (A) (based on SCr of 1.7 mg/dL (H)).    Accu-Checks  Recent Labs      04/08/18   1532  04/08/18   2109  04/09/18   0010  04/09/18   0519  04/09/18   0857  04/09/18   1241  04/09/18   1731  04/09/18   2029  04/10/18   0011  04/10/18   0412   POCTGLUCOSE  125*  142*  105  165*  292*  236*  310*  362*  298*  196*       Current Medications and/or Treatments Impacting Glycemic Control  Immunotherapy:  Immunosuppressants         Stop Route Frequency     tacrolimus (PROGRAF) 1 mg/mL oral syringe      -- PER G TUBE 2 " times daily        Steroids:   Hormones     Start     Stop Route Frequency Ordered    04/06/18 0900  predniSONE tablet 7.5 mg      -- PER G TUBE Daily 04/05/18 0926        Pressors:    Autonomic Drugs     Start     Stop Route Frequency Ordered    04/09/18 1500  midodrine tablet 2.5 mg      -- Oral 3 times daily 04/09/18 1006        Hyperglycemia/Diabetes Medications: Antihyperglycemics     Start     Stop Route Frequency Ordered    04/07/18 1116  insulin aspart U-100 pen 0-4 Units      -- SubQ As needed (PRN) 04/07/18 1017    04/07/18 1015  insulin regular (Humulin R) 100 Units in sodium chloride 0.9% 100 mL infusion      -- IV Continuous 04/07/18 1017

## 2018-04-10 NOTE — ASSESSMENT & PLAN NOTE
- baseline sCr 2.6-3.0 consistent with CKD stage IV  - anuric CACHORRO; likely ischemic ATN from prolonged pre-renal state (diarrhea) in setting of prograf renal vasoconstriction; cannot r/o septic ATN or Prograf toxicity  - SLED started 3/14. TDC placed 4/4/18.   - remains anuric  - Blood pressure have been stable today   -CVP of 10 with stable electrolytes.    Plan:  -no indication for RRT today. Will reassess in AM for HD vs. SLED needs.  Tentative plan to transition to iHD in anticipation for discharge in the next few weeks.

## 2018-04-11 NOTE — PROGRESS NOTES
[facility]  Nephrology  Progress Note    Patient Name: Lv Crocker  MRN: [id number]  Admission Date: 3/11/2018  Hospital Length of Stay: 30 days  Attending Provider: Kanika Ferreira MD   Primary Care Physician: Philippe Mohr MD  Principal Problem:Acute respiratory failure with hypoxia    Subjective:     HPI: Mr. Crocker is a 43 yo WM with T1DM, Hashimoto thyroiditis, h/o OHTx 11/2014, and CKD stage 4 who was admitted on 3/11/18 for persistent diarrhea. He reported a 3 week history of diarrhea up to 15x/day. Associated symptoms including URI symptoms, coughing fits, and coughing syncope. Prograf level was 14.3. His post-heart transplant course has been complicated by AMR 4/2015, CMV, post-transplant restrictive cardiomyopathy, recurrent pleural effusions/ascites requiring monthly thoracenteses/paracenteses, VATS 1/11/18 with Pleur-X catheter (now removed), and CKD stage 4 with baseline sCr 2.6-3.0. Baseline -120s and baseline BP 90s-110s/60-70s. Upon admission he was started on IVF and diarrhea workup was initiated. On 3/12 he was noted to have decreased UOP despite fluids; NS was increased to 100cc/hr. He was scheduled for a colonoscopy on 3/13 however procedure was cancelled due to hypoxia and fever. He was transferred to the ICU for closer monitoring. He continued to have oliguria overnight. Bladder scan revealed 200cc of urine but patient refused osullivan catheter placement. Wife reports that patient always has a hard time urinating again after osullivan catheters are placed/removed so he refuses them. He remained hypoxic despite FiO2 70% and ABG revealed combined respiratory and metabolic acidosis with pH 7.17. He was started on BiPAP as well as a bicarb infusion at 50cc/hr. ABG with mild improvement. AM labs revealed K 6.2 and he was shifted and given kayexalate.Trialysis catheter was placed this morning and he subsequently developed hypotension and was started on levophed. He was  intubated later this morning. Nephrology consulted for CACHORRO. All history obtained by primary team and chart review as patient was intubated/sedated on exam. Consent for dialysis obtained by patient's wife and placed in chart. Of note, both of patient's parents were on dialysis.     Interval History:   No complaints this morning, tolerated trach-collar overnight without issue.  He remains anuric, net positive 1.5L/24h.  Electrolytes stable this morning.  Primary team planning on transfusion of PRBC today.  CVP 10.    Review of patient's allergies indicates:   Allergen Reactions    No known drug allergies      Current Facility-Administered Medications   Medication Frequency    0.9%  NaCl infusion (for blood administration) Q24H PRN    0.9%  NaCl infusion (for blood administration) Q24H PRN    alprazolam ODT dissolvable tablet 0.5 mg TID PRN    chlorhexidine 0.12 % solution 15 mL BID    escitalopram oxalate tablet 20 mg Daily    famotidine tablet 20 mg QHS    heparin (porcine) injection 5,000 Units Q12H    insulin aspart U-100 pen 0-4 Units Q4H PRN    insulin regular (Humulin R) 100 Units in sodium chloride 0.9% 100 mL infusion Continuous    levothyroxine tablet 137 mcg Daily    metoclopramide HCl injection 5 mg Q6H PRN    midodrine tablet 2.5 mg TID    nortriptyline capsule 10 mg QHS    oxyCODONE 5 mg/5 mL solution 10 mg Q4H PRN    polyethylene glycol packet 17 g Daily    povidone-iodine 10 % ointment PRN    predniSONE tablet 7.5 mg Daily    sennosides 8.8 mg/5 ml syrup 5 mL QHS    tacrolimus (PROGRAF) 1 mg/mL oral syringe BID       Objective:     Vital Signs (Most Recent):  Temp: 97.6 °F (36.4 °C) (04/11/18 1100)  Pulse: 109 (04/11/18 1200)  Resp: (!) 4 (04/11/18 1200)  BP: 115/61 (04/11/18 1200)  SpO2: 100 % (04/11/18 1200)  O2 Device (Oxygen Therapy): Trach Collar (04/11/18 1200) Vital Signs (24h Range):  Temp:  [97.6 °F (36.4 °C)-98 °F (36.7 °C)] 97.6 °F (36.4 °C)  Pulse:  [101-116] 109  Resp:   [1-24] 4  SpO2:  [98 %-100 %] 100 %  BP: (102-137)/(54-77) 115/61     Weight: 63.2 kg (139 lb 5.3 oz) (04/11/18 0400)  Body mass index is 21.82 kg/m².  Body surface area is 1.73 meters squared.    I/O last 3 completed shifts:  In: 2034.6 [I.V.:24.6; NG/GT:2010]  Out: -     Physical Exam   Constitutional: He appears well-developed and well-nourished. No distress.   HENT:   Head: Normocephalic and atraumatic.   Eyes: Conjunctivae and EOM are normal.   Cardiovascular: Regular rhythm.  Tachycardia present.    Pulmonary/Chest: He has no wheezes. He has no rales.   Abdominal: Soft. He exhibits no distension.   Musculoskeletal: He exhibits edema. He exhibits no tenderness or deformity.   Neurological: He is alert.   Skin: Skin is warm and dry. He is not diaphoretic.       Significant Labs:  CBC:   Recent Labs  Lab 04/11/18  0439   WBC 3.19*   RBC 1.96*   HGB 6.3*   HCT 20.0*   *   *   MCH 32.1*   MCHC 31.5*     CMP:   Recent Labs  Lab 04/11/18  0439   *   CALCIUM 8.5*   ALBUMIN 2.0*   PROT 5.2*   *   K 4.9   CO2 29   CL 98   BUN 45*   CREATININE 2.8*   ALKPHOS 165*   ALT 8*   AST 16   BILITOT 0.5            Assessment/Plan:     Acute renal failure superimposed on stage 4 chronic kidney disease    - baseline sCr 2.6-3.0 consistent with CKD stage IV  - anuric CACHORRO; likely ischemic ATN from prolonged pre-renal state (diarrhea) in setting of prograf renal vasoconstriction; cannot r/o septic ATN or Prograf toxicity  - SLED started 3/14. TDC placed 4/4/18.   - remains anuric  - Blood pressure have been stable today   -CVP of 10 with stable electrolytes.    Plan:  -Plan for 3 hour HD trial today for metabolic clearance/volume management.  Will transfuse PRBC with hemodialysis today.  -UF goal 1-2L as tolerated.  -UF profile 4.  3 calcium bath.  300/800.  -will start DAQUAN with HD (4000 units q MWF)              Juaquin Ibrahim NP  Nephrology  [facility]  Pager:  [phone]      I have  reviewed and concur with the NP's history, physical, assessment, and plan. I have personally interviewed and examined the patient at bedside.

## 2018-04-11 NOTE — PROGRESS NOTES
[facility]  Heart Transplant  Progress Note    Patient Name: Lv Crocker  MRN: [id number]  Admission Date: 3/11/2018  Hospital Length of Stay: 30 days  Attending Physician: Kanika Ferreira MD  Primary Care Provider: Philippe Mohr MD  Principal Problem:Acute respiratory failure with hypoxia    Subjective:     Interval History: No complaints this AM other than right great toe pain (some discoloration noted to tip). Denies chest pain, SOB, NVD. HD today.    Continuous Infusions:   insulin (HUMAN R) infusion (adults) 0.8 Units/hr (04/11/18 1000)     Scheduled Meds:   chlorhexidine  15 mL Mouth/Throat BID    escitalopram oxalate  20 mg Per G Tube Daily    famotidine  20 mg Per G Tube QHS    heparin (porcine)  5,000 Units Subcutaneous Q12H    levothyroxine  137 mcg Per G Tube Daily    midodrine  2.5 mg Oral TID    nortriptyline  10 mg Per OG tube QHS    polyethylene glycol  17 g Oral Daily    predniSONE  7.5 mg Per G Tube Daily    sennosides 8.8 mg/5 ml  5 mL Oral QHS    tacrolimus  3 mg Per G Tube BID     PRN Meds:sodium chloride, sodium chloride, alprazolam ODT, insulin aspart U-100, metoclopramide HCl, oxyCODONE, povidone-iodine    Review of patient's allergies indicates:   Allergen Reactions    No known drug allergies      Objective:     Vital Signs (Most Recent):  Temp: 97.6 °F (36.4 °C) (04/11/18 0700)  Pulse: 108 (04/11/18 0900)  Resp: (!) 24 (04/11/18 0900)  BP: 124/71 (04/11/18 0900)  SpO2: 100 % (04/11/18 0900) Vital Signs (24h Range):  Temp:  [97.6 °F (36.4 °C)-98 °F (36.7 °C)] 97.6 °F (36.4 °C)  Pulse:  [101-121] 108  Resp:  [6-24] 24  SpO2:  [98 %-100 %] 100 %  BP: (102-137)/(54-77) 124/71     Patient Vitals for the past 72 hrs (Last 3 readings):   Weight   04/11/18 0400 63.2 kg (139 lb 5.3 oz)   04/10/18 1000 63.1 kg (139 lb 1.8 oz)   04/10/18 0701 63.1 kg (139 lb 1.8 oz)     Body mass index is 21.82 kg/m².      Intake/Output Summary (Last 24 hours) at 04/11/18  1139  Last data filed at 04/11/18 1000   Gross per 24 hour   Intake           1378.4 ml   Output                0 ml   Net           1378.4 ml     Hemodynamic Parameters:    Physical Exam   Constitutional: He appears well-developed and well-nourished. No distress.   HENT:   Head: Normocephalic and atraumatic.   Eyes: EOM are normal. No scleral icterus.   Neck: Normal range of motion.   Trach in place    Cardiovascular: Regular rhythm.    tachycardic   Pulmonary/Chest: Effort normal. No respiratory distress.   Perm cath in place no signs of infection   Abdominal:   Soft, NT, previous chest tube scars noted  No distension, no fluid wave  g tube in place with green drainage, wound C/D/I   Musculoskeletal: He exhibits no edema or tenderness.   Neurological: He is alert.   Skin: Skin is warm and dry.     Significant Labs:  CBC:    Recent Labs  Lab 04/09/18  1012 04/10/18  0409 04/11/18  0439   WBC 2.88* 3.65* 3.19*   RBC 2.26* 2.16* 1.96*   HGB 7.1* 6.8* 6.3*   HCT 22.6* 21.4* 20.0*   * 133* 112*   * 99* 102*   MCH 31.4* 31.5* 32.1*   MCHC 31.4* 31.8* 31.5*     BNP:  No results for input(s): BNP in the last 168 hours.    Invalid input(s): BNPTRIAGELBLO  CMP:    Recent Labs  Lab 04/09/18  0311 04/10/18  0409 04/11/18  0439   *  141*  141* 216* 241*   CALCIUM 7.3*  7.3*  7.3* 8.5* 8.5*   ALBUMIN 2.1*  2.1*  2.1* 2.2* 2.0*   PROT 5.4* 5.3* 5.2*     137  137 135* 135*   K 4.4  4.4  4.4 4.6 4.9   CO2 30*  30*  30* 27 29     100  100 97 98   BUN 5*  5*  5* 26* 45*   CREATININE 0.5  0.5  0.5 1.7* 2.8*   ALKPHOS 183* 183* 165*   ALT 11 11 8*   AST 28 20 16   BILITOT 0.9 0.7 0.5      Coagulation:   No results for input(s): PT, INR, APTT in the last 168 hours.  LDH:  No results for input(s): LDH in the last 72 hours.  Microbiology:  Microbiology Results (last 7 days)     Procedure Component Value Units Date/Time    Fungus culture [[id number]] Collected:  03/28/18 1607    Order  Status:  Completed Specimen:  Body Fluid from Lung, RLL Updated:  04/05/18 1118     Fungus (Mycology) Culture Culture in progress          I have reviewed all pertinent labs within the past 24 hours.    Estimated Creatinine Clearance: 30.1 mL/min (A) (based on SCr of 2.8 mg/dL (H)).    Diagnostic Results:  I have reviewed and interpreted all pertinent imaging results/findings within the past 24 hours.    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 Most recently has had ongoing issues with his lungs, had gotten 2 thoracenteses, after which he underwent VATS 01/19/18 followed by pleurex catheter placement and effusion drainage 01/11/18, subsequently had it removed 02/07/18 after drainage had decreased despite multiple attempts to drain it. Has been having significant coughing fits that result in him being near-syncopal at times, although difficult to say if he has passed out or not- patient most recently was admitted to the hospital for increased AGUILAR, coughing and pre-syncope 02/11-02/14/18 at [facility].   Patient was started on antibiotics for suspected bacterial infection, with some diarrhea, bronchitis, and possible pneumonia. Ct chest showed some pleural fluid collection and after talking with Dr. James, we arranged for him to receive a diagnostic tap with IR, from which the fluid collection demonstrated no growth or significant findings at all really. Cell counts do not appear to have been sent but will recheck. Patient states since his hospital stay, yesterday had a significant coughing fit again, after which he nearly passed out, is being seen in clinic today for this. Denies any cardiopulmonary complaints, no leg swelling, has some abdominal swelling, which is moderate for him. Denies significant shortness of breath  with mild exertion, had 6MWT yesterday to see if he qualified for home O2, and his O2 sats actually improved after recovery from where he started initially. He and his wife admit he is in bed most days, not very active.     Mr. Crocker presented to the ED 3/11/18 with approximately 3 weeks of worsening diarrhea and 2-3 days of sinus pressure, drainage, and worsened cough.  He notes that he had a coughing fit last night and passed out, coughed throughout most of the night.  This also happened on 2/25/18.  He last saw Dr Ferreira in clinic on 2/20/18 where he was taken off myfortic and switched to cellcept (he hadn't been on cellcept because of leukopenia when they tried post-transplant).  Though his diarrhea had improved after his last discharge, he states it has increased now to 15x/day, clear/yellow/green, no fevers, no exacerbating foods per say.  He was taken back off the cellcept and placed back on myfortic this past week and has not noticed immediate change in diarrhea. He also reports his baseline coughing fits havent improved and are minimally responsive to tessalon pearls.  Came on last night, he lost consciousness during a fit once which is relatively normal for him, and had two more during HPI.  He notes no sick contacts but does state he's had some URI symptoms as above.  His baseline vitals are usually -120 and BP 90s/60s-110s/70s.  He reports a history of intermittent diarrhea - did have Cdiff many years ago but this smells different.  No abdominal pain, no nausea, no vomiting.  No blood in diarrhea.  Appears last c-scope in 2015 with no evidence of infection or inflammatory processes.  He does report IBS which is different that this.       * Acute respiratory failure with hypoxia    - S/P Bronch and intubation 3/14 now post tracheostomy due to inability to extubate   - Pulmonology no long following as patient tolerating trach collar well   - will reach out to thoracic surgery about downsizing  trach  - RSV positive early in hospitalization. Completed course of Ribavarin/IVIG. Per lung transplant protocol for severe RSV (given myelosuppression).   - ID following. Completed 7 day course of Meropenem/Linezolid.   - All cultures remain negative.   - Appreciate PT/OT/Wound care.        Restrictive cardiomyopathy    - Has known history of recurrent ascites and pleural effusions   - S/P thoracentesis X 2, followed by VATS/pleurex cath placement (January 2018) with removal of pleurex (February 2018) and 3rd thoracentesis 2/14/18 with no significant findings on fluid removal.  - CXR from 4/9 stable from prior        Type 1 diabetes mellitus with stage 3 chronic kidney disease    - Appreciate Endocrine's recs  - Insulin gtt per endocrine recs  - Recent blood transfusions will likely affect A1cs (will appear lower than they likely are)         Hypothyroidism due to Hashimoto's thyroiditis    - Appreciate Endocrine's recs  - Continue IV levothyroxine 88mcg        Acute renal failure superimposed on stage 4 chronic kidney disease    - Appreciate Nephrology recs.   - Intermittent CRRT. Added middorine for pressure support during CRRT in hopes of tolerating HD in future.   - plan to try HD today. On midodrine and will adjust ti give midodrine 30 minutes prior to HD. +2 U PRBCs today  - permacath placed in OR        Anemia    - transfused one unit 4/4  - 2 units 4/11 during HD  - per nephrology, ok to resume procit        Heart transplanted    - TTE 3/26/18 showed normal graft function but has known history of restrictive CM post transplant.  - Continue pred 7.5, Tacro per G tube 3/3 (increasing/adjusting daily per Pharmacist recs)  - Cellcept remains on hold  - CMV pending  - CVP 10 today, HD planned for today        Debility    - PT/OT daily   - This will take time and require adequate nutrition   - PEG tube placed, tolerating tube feeds well. Nutrition following.        Anxiety    - Pt on nortriptyline/escitalopram at  home.  - Continue prn low dose xanax ODT.             Kelsea Ryan PA-C  Heart Transplant  [facility]

## 2018-04-11 NOTE — PLAN OF CARE
Problem: Patient Care Overview  Goal: Plan of Care Review  Outcome: Ongoing (interventions implemented as appropriate)  Shift free of acute events. Patient tolerated sleeping on Trach Collar for the duration of shift with oxygen saturations > 98%. Insulin gtt continued with PRN insulin given q4h. TF tolerated with minimal residuals - PEG side without complications. AM H/H 6.3/20 - Prince ABRAHAM notified. Patient bathed, on fresh linen, and turned q2h. POC reviewed with patient and spouse, Elizabeth, at bedside - all questions and concerns addressed.

## 2018-04-11 NOTE — PT/OT/SLP PROGRESS
"Physical Therapy Treatment    Patient Name:  Lv Crocker   MRN:  [id number]  Co-treat with OT  Recommendations:     Discharge Recommendations:  LTACH (long term acute care hospital)   Discharge Equipment Recommendations:  (TBD)   Barriers to discharge: Inaccessible home and Decreased caregiver support at current functional level     Assessment:     Lv Crocker is a 42 y.o. male admitted with a medical diagnosis of Acute respiratory failure with hypoxia.  He presents with the following impairments/functional limitations:  weakness, impaired functional mobilty, gait instability, impaired self care skills, impaired endurance, impaired balance, decreased coordination, decreased upper extremity function, decreased lower extremity function, impaired skin, edema, impaired cardiopulmonary response to activity. Pt progressing towards goals, but not at PLOF. Pt tolerated session well but had increased anxiety upon sitting EOB. Pt reported feeling like he was choking. RN suctioned pt, and that appeared to alleviate choking feeling  Pt is improving with therapy evidenced by increased sitting tolerance and increased ankle and knee AROM. Recommend d/c to LTAC to maximize functional independence.      Rehab Prognosis:  good; patient would benefit from acute skilled PT services to address these deficits and reach maximum level of function.      Recent Surgery: Procedure(s) (LRB):  INSERTION-CATHETER-PERM-A-CATH (Left)  INSERTION-TUBE-GASTROSTOMY (N/A) 7 Days Post-Op    Plan:     During this hospitalization, patient to be seen 5 x/week to address the above listed problems via gait training, therapeutic activities, therapeutic exercises, neuromuscular re-education  · Plan of Care Expires:  05/06/18   Plan of Care Reviewed with: patient    Subjective     Communicated with RN prior to session.  Patient found in bed upon PT entry to room, agreeable to treatment.      Chief Complaint: feeling "off"; feeling like he " was choking; VSS  Patient comments/goals: to return home   Pain/Comfort:  · Pain Rating 1:  (R foot; unrated)  · Pain Addressed 1: Distraction, Nurse notified    Patients cultural, spiritual, L[employer] conflicts given the current situation: none reported     Objective:     Patient found with: pulse ox (continuous), telemetry, blood pressure cuff, tracheostomy, oxygen, central line, peripheral IV, PEG Tube     General Precautions: Standard, fall   Orthopedic Precautions:N/A   Braces: N/A     Functional Mobility:  · Bed Mobility:  Rolling Left:  total assistance  · Rolling Right: total assistance  · Supine to Sit: total assistance  · Sit to Supine: total assistance  · Transfers:  Not performed   · Gait: not performed  · Balance:   · Static sit: total A  · Dynamic sit: total A      AM-PAC 6 CLICK MOBILITY  Turning over in bed (including adjusting bedclothes, sheets and blankets)?: 2  Sitting down on and standing up from a chair with arms (e.g., wheelchair, bedside commode, etc.): 1  Moving from lying on back to sitting on the side of the bed?: 2  Moving to and from a bed to a chair (including a wheelchair)?: 1  Need to walk in hospital room?: 1  Climbing 3-5 steps with a railing?: 1  Total Score: 8       Therapeutic Activities and Exercises:  Educated pt on PT POC  Educated pt on importance of OOB activity   Pt verbalized understanding    Sitting EOB x 25 minutes with total A to increase tolerance to OOB activity and to work extensively on postural control   · OT with B UE therex  · Sitting EOB B LE  · Ankle pumps x 10 reps AROM  · LAQ's x 10 reps AROM (50-75% of full ROM)  · Marching x 10 reps PROM    Rolling to L and R to assist RN with kamari cleaning and sheet change     Patient left HOB elevated with all lines intact, call button in reach and RN notified..    GOALS:    Physical Therapy Goals        Problem: Physical Therapy Goal    Goal Priority Disciplines Outcome Goal Variances Interventions   Physical Therapy  Goal     PT/OT, PT Ongoing (interventions implemented as appropriate)     Description:  Goals to be met by: 18     Patient will increase functional independence with mobility by performin. Supine to sit with Moderate Assistance - not met  2. Sit to supine with Moderate Assistance - not met  3. Rolling to Left and Right with Minimal Assistance. - not met  4. Sit to stand transfer with Moderate Assistance - not met  5. Bed to chair transfer with Maximum Assistance - not met  6. Gait  x 20 feet with Minimal Assistance. - not met                       Time Tracking:     PT Received On: 18  PT Start Time: 1300     PT Stop Time: 1342  PT Total Time (min): 42 min     Billable Minutes: Therapeutic Exercise 23    Treatment Type: Treatment  PT/PTA: PT     PTA Visit Number: 0     Teresa Dudley PT, DPT  2018  [phone]

## 2018-04-11 NOTE — SUBJECTIVE & OBJECTIVE
Interval History:   No complaints this morning, tolerated trach-collar overnight without issue.  He remains anuric, net positive 1.5L/24h.  Electrolytes stable this morning.  Primary team planning on transfusion of PRBC today.  CVP 10.    Review of patient's allergies indicates:   Allergen Reactions    No known drug allergies      Current Facility-Administered Medications   Medication Frequency    0.9%  NaCl infusion (for blood administration) Q24H PRN    0.9%  NaCl infusion (for blood administration) Q24H PRN    alprazolam ODT dissolvable tablet 0.5 mg TID PRN    chlorhexidine 0.12 % solution 15 mL BID    escitalopram oxalate tablet 20 mg Daily    famotidine tablet 20 mg QHS    heparin (porcine) injection 5,000 Units Q12H    insulin aspart U-100 pen 0-4 Units Q4H PRN    insulin regular (Humulin R) 100 Units in sodium chloride 0.9% 100 mL infusion Continuous    levothyroxine tablet 137 mcg Daily    metoclopramide HCl injection 5 mg Q6H PRN    midodrine tablet 2.5 mg TID    nortriptyline capsule 10 mg QHS    oxyCODONE 5 mg/5 mL solution 10 mg Q4H PRN    polyethylene glycol packet 17 g Daily    povidone-iodine 10 % ointment PRN    predniSONE tablet 7.5 mg Daily    sennosides 8.8 mg/5 ml syrup 5 mL QHS    tacrolimus (PROGRAF) 1 mg/mL oral syringe BID       Objective:     Vital Signs (Most Recent):  Temp: 97.6 °F (36.4 °C) (04/11/18 1100)  Pulse: 109 (04/11/18 1200)  Resp: (!) 4 (04/11/18 1200)  BP: 115/61 (04/11/18 1200)  SpO2: 100 % (04/11/18 1200)  O2 Device (Oxygen Therapy): Trach Collar (04/11/18 1200) Vital Signs (24h Range):  Temp:  [97.6 °F (36.4 °C)-98 °F (36.7 °C)] 97.6 °F (36.4 °C)  Pulse:  [101-116] 109  Resp:  [1-24] 4  SpO2:  [98 %-100 %] 100 %  BP: (102-137)/(54-77) 115/61     Weight: 63.2 kg (139 lb 5.3 oz) (04/11/18 0400)  Body mass index is 21.82 kg/m².  Body surface area is 1.73 meters squared.    I/O last 3 completed shifts:  In: 2034.6 [I.V.:24.6; NG/GT:2010]  Out: -     Physical  Exam   Constitutional: He appears well-developed and well-nourished. No distress.   HENT:   Head: Normocephalic and atraumatic.   Eyes: Conjunctivae and EOM are normal.   Cardiovascular: Regular rhythm.  Tachycardia present.    Pulmonary/Chest: He has no wheezes. He has no rales.   Abdominal: Soft. He exhibits no distension.   Musculoskeletal: He exhibits edema. He exhibits no tenderness or deformity.   Neurological: He is alert.   Skin: Skin is warm and dry. He is not diaphoretic.       Significant Labs:  CBC:   Recent Labs  Lab 04/11/18 0439   WBC 3.19*   RBC 1.96*   HGB 6.3*   HCT 20.0*   *   *   MCH 32.1*   MCHC 31.5*     CMP:   Recent Labs  Lab 04/11/18 0439   *   CALCIUM 8.5*   ALBUMIN 2.0*   PROT 5.2*   *   K 4.9   CO2 29   CL 98   BUN 45*   CREATININE 2.8*   ALKPHOS 165*   ALT 8*   AST 16   BILITOT 0.5

## 2018-04-11 NOTE — ASSESSMENT & PLAN NOTE
- TTE 3/26/18 showed normal graft function but has known history of restrictive CM post transplant.  - Continue pred 7.5, Tacro per G tube 3/3 (increasing/adjusting daily per Pharmacist recs)  - Cellcept remains on hold  - CMV pending  - CVP 10 today, HD planned for today

## 2018-04-11 NOTE — PLAN OF CARE
Problem: Occupational Therapy Goal  Goal: Occupational Therapy Goal  Goals to be met by: 4/23/2018    Pt will follow 75% of motor commands with <2 verbal cues for repetition of task to maximize engagement in grooming task.--MET  Pt will complete feeding with mod A.   Pt will complete supine with HOB slightly elevated to seated at EOB with mod A in prep for seated grooming task.  Pt will engage in BUE exercises in orders to increase strength to 3+/5 in BUE's to increase engagement in seated grooming task  Pt will sit at EOB for approx 10 minutes with mod A and unilateral UE support to complete grooming task  Pt will complete sit>stand from EOB with bed in lowest position with mod A in prep for transfer to C    Goals remain appropriate

## 2018-04-11 NOTE — PROGRESS NOTES
"[facility]  Endocrinology  Progress Note    Admit Date: 3/11/2018     Reason for Consult: abnormal TFTs    Surgical Procedure and Date: s/p heart transplant 2014     Diabetes diagnosis year: 7yo (T1DM)     Home Diabetes Medications:    Levemir 15u qHS  Novolog 4u AC     How often checking glucose at home? 4 times daily   BG readings on regimen: 150-200s  Hypoglycemia on the regimen?  Yes, rarely - treats appropriately (light snack, glucose tab)  Missed doses on regimen?  No        Diabetes Complications include:     Hyperglycemia, Hypoglycemia  and Diabetic chronic kidney disease          Complicating diabetes co morbidities:   N/A     HPI:   Patient is a 42 y.o. male with a diagnosis of T1DM, HTN, hypothyroidism, s/p heart transplant.  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 He required serial paracentesis and thoracentesis until he underwent a VATS with pleurX catheter placement on 1/11/2018 and removed 2/7/18.       Presented to hospital secondary to diarrhea and cough.  Upon initial labs, TSH was decreased (0.101) and free T4 1.33.  Endocrinology consulted to assist in management of these labs.  He was last seen in clinic by Kamala Vázquez NP 11/2017.   Previous hospitalization, endocrine consulted for same problem.  During that visit, recommended decreasing LT4 to 125mcg daily. Unfortunately, patient was discharged on previous dose of 150mcg daily.     Interval HPI:   Overnight events:  AF, tachycardic, low normal BP  Alternating between trach and vent  On PDN 7.5mg daily     AM .  Received   2u correction  Insulin gtt at 0.8u/h, still with global elevation  Peak BG at this rate, 356.  TF at goal 50cc/h.   Tolerating well. No nausea    /71 (BP Location: Right arm, Patient Position: Lying)   Pulse 108   Temp 97.6 °F (36.4 °C) (Axillary)   Resp (!) 24   Ht 5' 7" (1.702 " m)   Wt 63.2 kg (139 lb 5.3 oz)   SpO2 (!) 86%   BMI 21.82 kg/m²       Labs Reviewed and Include      Recent Labs  Lab 04/11/18  0439   *   CALCIUM 8.5*   ALBUMIN 2.0*   PROT 5.2*   *   K 4.9   CO2 29   CL 98   BUN 45*   CREATININE 2.8*   ALKPHOS 165*   ALT 8*   AST 16   BILITOT 0.5     Lab Results   Component Value Date    WBC 3.19 (L) 04/11/2018    HGB 6.3 (L) 04/11/2018    HCT 20.0 (L) 04/11/2018     (H) 04/11/2018     (L) 04/11/2018     No results for input(s): TSH, FREET4 in the last 168 hours.  Lab Results   Component Value Date    HGBA1C 5.1 04/05/2018       Nutritional status:   Body mass index is 21.82 kg/m².  Lab Results   Component Value Date    ALBUMIN 2.0 (L) 04/11/2018    ALBUMIN 2.2 (L) 04/10/2018    ALBUMIN 2.1 (L) 04/09/2018    ALBUMIN 2.1 (L) 04/09/2018    ALBUMIN 2.1 (L) 04/09/2018     Lab Results   Component Value Date    PREALBUMIN 13 (L) 04/08/2018    PREALBUMIN 5 (L) 03/15/2018    PREALBUMIN 18 (L) 03/24/2015       Estimated Creatinine Clearance: 30.1 mL/min (A) (based on SCr of 2.8 mg/dL (H)).    Accu-Checks  Recent Labs      04/09/18   1731  04/09/18   2029  04/10/18   0011  04/10/18   0412  04/10/18   0842  04/10/18   1259  04/10/18   1706  04/10/18   2118  04/11/18   0055  04/11/18   0450   POCTGLUCOSE  310*  362*  298*  196*  382*  321*  356*  271*  290*  279*       Current Medications and/or Treatments Impacting Glycemic Control  Immunotherapy:  Immunosuppressants         Stop Route Frequency     tacrolimus (PROGRAF) 1 mg/mL oral syringe      -- PER G TUBE 2 times daily        Steroids:   Hormones     Start     Stop Route Frequency Ordered    04/06/18 0900  predniSONE tablet 7.5 mg      -- PER G TUBE Daily 04/05/18 0926        Pressors:    Autonomic Drugs     Start     Stop Route Frequency Ordered    04/09/18 1500  midodrine tablet 2.5 mg      -- Oral 3 times daily 04/09/18 1006        Hyperglycemia/Diabetes Medications: Antihyperglycemics     Start     Stop  Route Frequency Ordered    04/11/18 0015  insulin regular (Humulin R) 100 Units in sodium chloride 0.9% 100 mL infusion     Question:  Insulin Rate Adjustment (DO NOT MODIFY ANSWER)  Answer:  [email or web address]\[email or web address] [id number]    -- IV Continuous 04/10/18 2313    04/10/18 2221  insulin aspart U-100 pen 0-4 Units      -- SubQ Every 4 hours PRN 04/10/18 2122          ASSESSMENT and PLAN    * Acute respiratory failure with hypoxia    Per primary  S/p trach.  Practicing with speech valve.  Remains NPO        Type 1 diabetes mellitus with stage 3 chronic kidney disease    BG goal 140-1180     Globally elevated.  Unclear of etiology of increasing glucose (possibly better absorption of TF).  Transition insulin gtt at 0.8 u/hr, low correction q4hr while on TF  Would titrate by 25%, but worsening creatine leads to concern for insulin stacking and hypoglycemia.  If plans for HD/SLED today, will increase rate to 1.0u/h.  If no plans for HD/SLED, will continue at current rate.      Pt is T1DM, do not suspend insulin >1 hr   If TF held, decrease insulin rate to 0.2u/hr - known to have controlled BG on basal needs of lev 5 daily/ 0.2u/hr during this hospitalization        Discharge Recommendations:  TBD.        On enteral nutrition    TF at goal.  May increase insulin needs        Current chronic use of systemic steroids    On PDN 7.5mg daily  Can increase insulin requirement        CKD (chronic kidney disease), stage IV    Titrate cautiously.  Caution with insulin stacking.  Avoid hypoglycemia.        Hypothyroidism due to Hashimoto's thyroiditis    Continue Lt4 137mcg per G tube    Ideally should hold TF 1 hr before and 1 hr after given LT4      Recheck TFTs in 4 weeks (around 5/6)              Deonna Larry MD  Endocrinology  [facility]

## 2018-04-11 NOTE — PLAN OF CARE
Problem: SLP Goal  Goal: SLP Goal  Speech Language Pathology Goals  Goals expected to be met by 4/13/18    1.  Pt will tolerate PMSV for 3 min w/ no change in respiratory status and fair voicing produced.  2.  Pt will complete further evaluation of cognitive-linguistic communication skills to determine the need for additional goals.       Outcome: Ongoing (interventions implemented as appropriate)  SLP met with Pt and spouse today to review ongoing SLP POC and plans for ongoing PMSV trials with pending trach downsize. See note for full details. Thank you.    JOSELYN Delarosa., [facility]-SLP  Speech-Language Pathology  Pager: [phone]  4/11/2018

## 2018-04-11 NOTE — PLAN OF CARE
#8 Shiley tracheostomy removed and replaced with #6 cuffless Shiley with new inner cannula at bedside today. Patient tolerated well.  Please call with any further questions or concerns.     Galilea Mar PA-C  Thoracic Surgery  [phone]

## 2018-04-11 NOTE — ASSESSMENT & PLAN NOTE
- S/P Bronch and intubation 3/14 now post tracheostomy due to inability to extubate   - Pulmonology no long following as patient tolerating trach collar well   - will reach out to thoracic surgery about downsizing trach  - RSV positive early in hospitalization. Completed course of Ribavarin/IVIG. Per lung transplant protocol for severe RSV (given myelosuppression).   - ID following. Completed 7 day course of Meropenem/Linezolid.   - All cultures remain negative.   - Appreciate PT/OT/Wound care.

## 2018-04-11 NOTE — PROGRESS NOTES
Acute/Bedside dialysis started through LEFT IJ permcath. Tolerated well. Both arterial and venous ports with good flow. Primary nurse and family at the bedside.

## 2018-04-11 NOTE — SUBJECTIVE & OBJECTIVE
"Interval HPI:   Overnight events:  AF, tachycardic, low normal BP  Alternating between trach and vent  On PDN 7.5mg daily     AM .  Received 2u correction  Insulin gtt at 0.8u/h, still with global elevation  Peak BG at this rate, 356.  TF at goal 50cc/h.   Tolerating well. No nausea    /71 (BP Location: Right arm, Patient Position: Lying)   Pulse 108   Temp 97.6 °F (36.4 °C) (Axillary)   Resp (!) 24   Ht 5' 7" (1.702 m)   Wt 63.2 kg (139 lb 5.3 oz)   SpO2 (!) 86%   BMI 21.82 kg/m²     Labs Reviewed and Include      Recent Labs  Lab 04/11/18  0439   *   CALCIUM 8.5*   ALBUMIN 2.0*   PROT 5.2*   *   K 4.9   CO2 29   CL 98   BUN 45*   CREATININE 2.8*   ALKPHOS 165*   ALT 8*   AST 16   BILITOT 0.5     Lab Results   Component Value Date    WBC 3.19 (L) 04/11/2018    HGB 6.3 (L) 04/11/2018    HCT 20.0 (L) 04/11/2018     (H) 04/11/2018     (L) 04/11/2018     No results for input(s): TSH, FREET4 in the last 168 hours.  Lab Results   Component Value Date    HGBA1C 5.1 04/05/2018       Nutritional status:   Body mass index is 21.82 kg/m².  Lab Results   Component Value Date    ALBUMIN 2.0 (L) 04/11/2018    ALBUMIN 2.2 (L) 04/10/2018    ALBUMIN 2.1 (L) 04/09/2018    ALBUMIN 2.1 (L) 04/09/2018    ALBUMIN 2.1 (L) 04/09/2018     Lab Results   Component Value Date    PREALBUMIN 13 (L) 04/08/2018    PREALBUMIN 5 (L) 03/15/2018    PREALBUMIN 18 (L) 03/24/2015       Estimated Creatinine Clearance: 30.1 mL/min (A) (based on SCr of 2.8 mg/dL (H)).    Accu-Checks  Recent Labs      04/09/18   1731  04/09/18   2029  04/10/18   0011  04/10/18   0412  04/10/18   0842  04/10/18   1259  04/10/18   1706  04/10/18   2118  04/11/18   0055  04/11/18   0450   POCTGLUCOSE  310*  362*  298*  196*  382*  321*  356*  271*  290*  279*       Current Medications and/or Treatments Impacting Glycemic Control  Immunotherapy:  Immunosuppressants         Stop Route Frequency     tacrolimus (PROGRAF) 1 mg/mL oral " syringe      -- PER G TUBE 2 times daily        Steroids:   Hormones     Start     Stop Route Frequency Ordered    04/06/18 0900  predniSONE tablet 7.5 mg      -- PER G TUBE Daily 04/05/18 0926        Pressors:    Autonomic Drugs     Start     Stop Route Frequency Ordered    04/09/18 1500  midodrine tablet 2.5 mg      -- Oral 3 times daily 04/09/18 1006        Hyperglycemia/Diabetes Medications: Antihyperglycemics     Start     Stop Route Frequency Ordered    04/11/18 0015  insulin regular (Humulin R) 100 Units in sodium chloride 0.9% 100 mL infusion     Question:  Insulin Rate Adjustment (DO NOT MODIFY ANSWER)  Answer:  [email or web address]\Images\Pharmacy\InsulinInfusions\InsulinRegAdj [id number].pdf    -- IV Continuous 04/10/18 2313    04/10/18 2221  insulin aspart U-100 pen 0-4 Units      -- SubQ Every 4 hours PRN 04/10/18 2122

## 2018-04-11 NOTE — PT/OT/SLP PROGRESS
Occupational Therapy   Treatment    Name: Lv Crocker  MRN: [id number]  Admitting Diagnosis:  Acute respiratory failure with hypoxia  7 Days Post-Op    Recommendations:     Discharge Recommendations: LTACH (long term acute care hospital)    Subjective     Communicated with: nsg prior to session.  Pain/Comfort:  · Pain Rating 1:  (pt in left foot. pt unable to rate pain.)    Patients cultural, spiritual, Episcopal conflicts given the current situation: none reported     Objective:     Patient found supine in bed with trach collar in place. :      General Precautions: Standard, fall, NPO   Orthopedic Precautions:N/A     Occupational Performance:    Bed Mobility:    · Patient completed Supine to Sit with total assistance  · Patient completed Sit to Supine with total assistance   · Patient completed rolling right <>left TOTAL A    Activities of Daily Living:  · TOTAL A for all ADL's at this time.     Patient left supine with all lines intact, call button in reach and nsg notified    [employer] 6 Click:  [employer] [facility] Total Score: 6    Treatment & Education:  Pt tolerated sitting EOB x 25 min with zero to Poor sitting balance. Pt able to obtain and maintain neutral cervical positioning but needs cues to initiate this task. Pt keeps neck in cervical flexion and right lateral flexion.  P/AAROM completed B UE all available planes. Scapular mobilizations completed for scapular protraction and retraction to increase left UE ROM without c/o.   Education provided re: current progress as well as provided pt with encouragement throughout session. Pt reported feeling anxious with mobility.  RT also arrived to room to suction pt while seated EOB after pt reported feeling like he was choking. VSS remained stable.   Pt returned supine with UE's elevated on pillows to provided assist with edema ambar't and support to shoulder girdle.   Education:    Assessment:     Lv Crocker is a 42 y.o. male with a medical diagnosis of Acute  respiratory failure with hypoxia. Performance deficits affecting function are weakness, impaired functional mobilty, gait instability, impaired self care skills, impaired endurance, impaired balance, pain, decreased coordination, decreased upper extremity function.  Pt tolerated session well with good effort and performance. VSS throughout session.   Goals updated this date to reflect current progress.     Rehab Prognosis:  Good ; patient would benefit from acute skilled OT services to address these deficits and reach maximum level of function.       Plan:     Patient to be seen 5 x/week to address the above listed problems via self-care/home management, therapeutic exercises, therapeutic activities  · Plan of Care Expires: 05/06/18  · Plan of Care Reviewed with: patient, spouse    This Plan of care has been discussed with the patient who was involved in its development and understands and is in agreement with the identified goals and treatment plan    GOALS:    Occupational Therapy Goals        Problem: Occupational Therapy Goal    Goal Priority Disciplines Outcome Interventions   Occupational Therapy Goal     OT, PT/OT Ongoing (interventions implemented as appropriate)    Description:  Goals to be met by: 4/23/2018    Pt will follow 75% of motor commands with <2 verbal cues for repetition of task to maximize engagement in grooming task.--MET  Pt will complete feeding with mod A.   Pt will complete supine with HOB slightly elevated to seated at EOB with mod A in prep for seated grooming task.  Pt will engage in BUE exercises in orders to increase strength to 3+/5 in BUE's to increase engagement in seated grooming task  Pt will sit at EOB for approx 10 minutes with mod A and unilateral UE support to complete grooming task  Pt will complete sit>stand from EOB with bed in lowest position with mod A in prep for [facility]                      Time Tracking:     OT Date of Treatment: 04/11/18  OT Start Time:  1300  OT Stop Time: 1345  OT Total Time (min): 45 min    Billable Minutes:Therapeutic Activity 15  Therapeutic Exercise 15    AMADOU Villanueva  4/11/2018

## 2018-04-11 NOTE — ASSESSMENT & PLAN NOTE
- baseline sCr 2.6-3.0 consistent with CKD stage IV  - anuric CACHORRO; likely ischemic ATN from prolonged pre-renal state (diarrhea) in setting of prograf renal vasoconstriction; cannot r/o septic ATN or Prograf toxicity  - SLED started 3/14. TDC placed 4/4/18.   - remains anuric  - Blood pressure have been stable today   -CVP of 10 with stable electrolytes.    Plan:  -Plan for 3 hour HD trial today for metabolic clearance/volume management.  Will transfuse PRBC with hemodialysis today.  -UF goal 1-2L as tolerated.  -UF profile 4.  3 calcium bath.  300/800.  -will start DAQUAN with HD (4000 units q MWF)

## 2018-04-11 NOTE — PT/OT/SLP PROGRESS
"Speech Language Pathology Treatment    Patient Name:  Lv Crocker   MRN:  [id number]  Admitting Diagnosis: Acute respiratory failure with hypoxia    Recommendations:                 General Recommendations:  Cognitive-linguistic evaluation and One Way Speaking Valve  Diet recommendations:  NPO, Liquid Diet Level: NPO   Aspiration Precautions: Continue alternate means of nutrition and Strict aspiration precautions   General Precautions: Standard, fall  Communication strategies:  provide increased time to answer and pt mouthing to express wants/needs. Consideration of sip&puff call light advised    Subjective     SLP reviewed Pt with nurse and RT, both report minimal secretions, clear secretions  Pt presents calm, cooperative  He denies pain  He explains, "Just something to eat really"  Pt's spouse explains, "They are looking into downsizing his trach"  Pt denies pain    Pain/Comfort:  · Pain Rating 1: 0/10    Objective:     Has the patient been evaluated by SLP for swallowing?   No  Keep patient NPO? Yes   Current Respiratory Status: trach with cuff deflated     Patient found awake in bed with Shiley 8.0 in place,cuff deflated, with trach collar. Niece at bedside with Patient, nurse talking to spouse. Nurse leaves room as SLP initiates session. Pt and  Family educated on speaking valve technology, anatomy s/p trach, and possibility of increased tolerance of PMSV following downsizing of trach.  PMSV trials defered as Patient with minimal tolerance with current Size 8 trach.  Patient asking SLP when he can have something to eat. SLP educates Pt and spouse on SLP role, ongoing speaking valve training, ongoing assessment of airway protection and risk of aspiration s/p trach. Patient nods head intermittently t/o SLP education; however, is not able to verbalize understanding. Spouse verbalizes understanding. Whiteboard current. No further questions.     Assessment:     Lv Crocker is a 42 y.o. male with " an SLP diagnosis of S/P Trach and One Way Speaking Valve Training.  He would benefit from ongoing assessment of speaking valve with smaller trach size. ST to continue to monitor.     Goals:    SLP Goals        Problem: SLP Goal    Goal Priority Disciplines Outcome   SLP Goal     SLP Ongoing (interventions implemented as appropriate)   Description:  Speech Language Pathology Goals  Goals expected to be met by 4/13/18    1.  Pt will tolerate PMSV for 3 min w/ no change in respiratory status and fair voicing produced.  2.  Pt will complete further evaluation of cognitive-linguistic communication skills to determine the need for additional goals.                        Plan:     · Patient to be seen:  5 x/week   · Plan of Care expires:  05/05/18  · Plan of Care reviewed with:  patient   · SLP Follow-Up:  Yes       Discharge recommendations:  LTACH (long term acute care hospital)   Barriers to Discharge:  Level of Skilled Assistance Needed     Time Tracking:     SLP Treatment Date:   04/11/18  Speech Start Time:  1225  Speech Stop Time:  1240     Speech Total Time (min):  15 min    Billable Minutes: Seld Care/Home Management Training 15    RAO Delarosa, CCC-SLP  Speech-Language Pathology  Pager: [phone]      04/11/2018

## 2018-04-11 NOTE — PLAN OF CARE
Problem: Patient Care Overview  Goal: Plan of Care Review  Outcome: Ongoing (interventions implemented as appropriate)  No acute events throughout day. See vital signs and assessments in flowsheets. See below for updates on today's progress.     Pulmonary: On trach collar, downsized trach tube today to 6, at 28% 5LPM, saturating well at %, secretion is small whitish thin to thick    Cardiovascular: -130/60mmHg, HR sinus tachy 100s, no report of chest pain    Neurological: Responsive, alert, obeys command, cooperative    Gastrointestinal: On Glucerna at 50ml/hr, minimal residuals noted (10ml); BMx2    Genitourinary: Anuric, HD started 1800H    Endocrine: CBG q4hrs, with PRN Insulin sliding scale    Integumentary/Other: Noted serosanguinous leak on Peg site (ascites), Noted blackening on 3 digits on right foot and tenderness on left big toe.No pressure sore    Infusions:  Insulin at 1unit/hr    LADS:  Left HD cath, Left Midline, Right Ac, PEG, Trach-(downsized to Shiley 6 today)    Plan of Care: PRBCs 2 units with HD tonight, continue on trache collar as tolerated    Patient progressing towards goals as tolerated, plan of care communicated and reviewed with Lv Crocker and family. All concerns addressed. Will continue to monitor.

## 2018-04-11 NOTE — PHYSICIAN QUERY
PT Name: Lv Crocker  MR #: [id number]    Physician Query Form - Neurological Condition Clarification       CDS/: Emilie Hussein RN, CCDS             Contact information: [email or web address]    This form is a permanent document in the medical record.     Query Date: April 11, 2018    By submitting this query, we are merely seeking further clarification of documentation. Please utilize your independent clinical judgment when addressing the question(s) below.    The Medical record contains the following:   Indicators   Supporting Clinical Findings Location in Medical Record   X AMS, Confusion, LOC, etc. we are consulted for AMS. 4/7 neuro note   X Acute / Chronic Illness Acute Encephalopathy  Suspect ICU delirium given waxing/waning status with component of VISHAL--oxycodone    Acute respiratory failure with hypoxia  S/p tracheostomy 3/29 4/7 neuro note        3/13-4/11 prog notes  3/29 op note    Radiology Findings     X Electrolyte Imbalance Recent mild hypercarbia, CACHORRO on CKD--HD, recent labile BG. 4/7 neuro     Medication      Treatment     X Other  Abnormal EEG with generalized slowing indicative of a marked   diffuse, but nonspecific encephalopathy without evidence of focal changes nor an   epileptic process. EEG 3/23     Provider, please specify the diagnosis or diagnoses associated with above clinical findings.      [ x ] Metabolic Encephalopathy    [  ] Other Encephalopathy    [  ] Unspecified Encephalopathy    [  ] Other (please specify): _____________________________________    [  ] Clinically Undetermined      Please document in your progress notes daily for the duration of treatment until resolved, and  include in your discharge summary.

## 2018-04-11 NOTE — PROGRESS NOTES
UPDATE    SW to pt's room for update. Pt presents as aaox3 with calm and pleasant affect. Pt's wife at bedside. Pt and wife report coping adequately with no needs at this time. Per [facility], pt will need to be on intermittent HD before transfer. SW will continue to communicate with [facility] and send updated notes. SW providing psychosocial and counseling support, education, resources, and d/c planning as needed. SW continuing to follow and remains available.

## 2018-04-11 NOTE — SUBJECTIVE & OBJECTIVE
Interval History: No complaints this AM other than right great toe pain (some discoloration noted to tip). Denies chest pain, SOB, NVD. HD today.    Continuous Infusions:   insulin (HUMAN R) infusion (adults) 0.8 Units/hr (04/11/18 1000)     Scheduled Meds:   chlorhexidine  15 mL Mouth/Throat BID    escitalopram oxalate  20 mg Per G Tube Daily    famotidine  20 mg Per G Tube QHS    heparin (porcine)  5,000 Units Subcutaneous Q12H    levothyroxine  137 mcg Per G Tube Daily    midodrine  2.5 mg Oral TID    nortriptyline  10 mg Per OG tube QHS    polyethylene glycol  17 g Oral Daily    predniSONE  7.5 mg Per G Tube Daily    sennosides 8.8 mg/5 ml  5 mL Oral QHS    tacrolimus  3 mg Per G Tube BID     PRN Meds:sodium chloride, sodium chloride, alprazolam ODT, insulin aspart U-100, metoclopramide HCl, oxyCODONE, povidone-iodine    Review of patient's allergies indicates:   Allergen Reactions    No known drug allergies      Objective:     Vital Signs (Most Recent):  Temp: 97.6 °F (36.4 °C) (04/11/18 0700)  Pulse: 108 (04/11/18 0900)  Resp: (!) 24 (04/11/18 0900)  BP: 124/71 (04/11/18 0900)  SpO2: 100 % (04/11/18 0900) Vital Signs (24h Range):  Temp:  [97.6 °F (36.4 °C)-98 °F (36.7 °C)] 97.6 °F (36.4 °C)  Pulse:  [101-121] 108  Resp:  [6-24] 24  SpO2:  [98 %-100 %] 100 %  BP: (102-137)/(54-77) 124/71     Patient Vitals for the past 72 hrs (Last 3 readings):   Weight   04/11/18 0400 63.2 kg (139 lb 5.3 oz)   04/10/18 1000 63.1 kg (139 lb 1.8 oz)   04/10/18 0701 63.1 kg (139 lb 1.8 oz)     Body mass index is 21.82 kg/m².      Intake/Output Summary (Last 24 hours) at 04/11/18 1139  Last data filed at 04/11/18 1000   Gross per 24 hour   Intake           1378.4 ml   Output                0 ml   Net           1378.4 ml     Hemodynamic Parameters:    Physical Exam   Constitutional: He appears well-developed and well-nourished. No distress.   HENT:   Head: Normocephalic and atraumatic.   Eyes: EOM are normal. No  scleral icterus.   Neck: Normal range of motion.   Trach in place    Cardiovascular: Regular rhythm.    tachycardic   Pulmonary/Chest: Effort normal. No respiratory distress.   Perm cath in place no signs of infection   Abdominal:   Soft, NT, previous chest tube scars noted  No distension, no fluid wave  g tube in place with green drainage, wound C/D/I   Musculoskeletal: He exhibits no edema or tenderness.   Neurological: He is alert.   Skin: Skin is warm and dry.     Significant Labs:  CBC:    Recent Labs  Lab 04/09/18  1012 04/10/18  0409 04/11/18  0439   WBC 2.88* 3.65* 3.19*   RBC 2.26* 2.16* 1.96*   HGB 7.1* 6.8* 6.3*   HCT 22.6* 21.4* 20.0*   * 133* 112*   * 99* 102*   MCH 31.4* 31.5* 32.1*   MCHC 31.4* 31.8* 31.5*     BNP:  No results for input(s): BNP in the last 168 hours.    Invalid input(s): BNPTRIAGELBLO  CMP:    Recent Labs  Lab 04/09/18  0311 04/10/18  0409 04/11/18  0439   *  141*  141* 216* 241*   CALCIUM 7.3*  7.3*  7.3* 8.5* 8.5*   ALBUMIN 2.1*  2.1*  2.1* 2.2* 2.0*   PROT 5.4* 5.3* 5.2*     137  137 135* 135*   K 4.4  4.4  4.4 4.6 4.9   CO2 30*  30*  30* 27 29     100  100 97 98   BUN 5*  5*  5* 26* 45*   CREATININE 0.5  0.5  0.5 1.7* 2.8*   ALKPHOS 183* 183* 165*   ALT 11 11 8*   AST 28 20 16   BILITOT 0.9 0.7 0.5      Coagulation:   No results for input(s): PT, INR, APTT in the last 168 hours.  LDH:  No results for input(s): LDH in the last 72 hours.  Microbiology:  Microbiology Results (last 7 days)     Procedure Component Value Units Date/Time    Fungus culture [[id number]] Collected:  03/28/18 1607    Order Status:  Completed Specimen:  Body Fluid from Lung, RLL Updated:  04/05/18 111     Fungus (Mycology) Culture Culture in progress          I have reviewed all pertinent labs within the past 24 hours.    Estimated Creatinine Clearance: 30.1 mL/min (A) (based on SCr of 2.8 mg/dL (H)).    Diagnostic Results:  I have reviewed and interpreted  all pertinent imaging results/findings within the past 24 hours.

## 2018-04-11 NOTE — ASSESSMENT & PLAN NOTE
- Appreciate Nephrology recs.   - Intermittent CRRT. Added middorine for pressure support during CRRT in hopes of tolerating HD in future.   - plan to try HD today. On midodrine and will adjust ti give midodrine 30 minutes prior to HD. +2 U PRBCs today  - permacath placed in OR

## 2018-04-11 NOTE — ASSESSMENT & PLAN NOTE
BG goal 140-1180     Globally elevated.  Unclear of etiology of increasing glucose (possibly better absorption of TF).  Transition insulin gtt at 0.8 u/hr, low correction q4hr while on TF  Would titrate by 25%, but worsening creatine leads to concern for insulin stacking and hypoglycemia.  If plans for HD/SLED today, will increase rate to 1.0u/h.  If no plans for HD/SLED, will continue at current rate.      Pt is T1DM, do not suspend insulin >1 hr   If TF held, decrease insulin rate to 0.2u/hr - known to have controlled BG on basal needs of lev 5 daily/ 0.2u/hr during this hospitalization        Discharge Recommendations:  TBD.

## 2018-04-12 NOTE — PROGRESS NOTES
"[facility]  Endocrinology  Progress Note    Admit Date: 3/11/2018     Reason for Consult: abnormal TFTs    Surgical Procedure and Date: s/p heart transplant 2014     Diabetes diagnosis year: 5yo (T1DM)     Home Diabetes Medications:    Levemir 15u qHS  Novolog 4u AC     How often checking glucose at home? 4 times daily   BG readings on regimen: 150-200s  Hypoglycemia on the regimen?  Yes, rarely - treats appropriately (light snack, glucose tab)  Missed doses on regimen?  No        Diabetes Complications include:     Hyperglycemia, Hypoglycemia  and Diabetic chronic kidney disease          Complicating diabetes co morbidities:   N/A     HPI:   Patient is a 42 y.o. male with a diagnosis of T1DM, HTN, hypothyroidism, s/p heart transplant.  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 He required serial paracentesis and thoracentesis until he underwent a VATS with pleurX catheter placement on 1/11/2018 and removed 2/7/18.       Presented to hospital secondary to diarrhea and cough.  Upon initial labs, TSH was decreased (0.101) and free T4 1.33.  Endocrinology consulted to assist in management of these labs.  He was last seen in clinic by Kamala Vázquez NP 11/2017.   Previous hospitalization, endocrine consulted for same problem.  During that visit, recommended decreasing LT4 to 125mcg daily. Unfortunately, patient was discharged on previous dose of 150mcg daily.     Interval HPI:   Overnight events:  AF, tachycardic, low normal BP  On PDN 7.5mg daily     0415 - .  Received 4u correction  0900 - .  Received 4u correction  Insulin gtt at 1.0u/h, still with global elevation  Peak BG at this rate, 390.  TF at goal 50cc/h.   Tolerating well. No nausea    /65 (BP Location: Right arm, Patient Position: Lying)   Pulse (!) 111   Temp 98.3 °F (36.8 °C) (Oral)   Resp 13   Ht 5' 7" " (1.702 m)   Wt 63.1 kg (139 lb 1.8 oz)   SpO2 100%   BMI 21.79 kg/m²       Labs Reviewed and Include      Recent Labs  Lab 04/12/18  0412   *   CALCIUM 8.9   ALBUMIN 2.2*   PROT 5.9*   *   K 5.0   CO2 22*      BUN 29*   CREATININE 2.0*   ALKPHOS 188*   ALT 12   AST 25   BILITOT 0.7     Lab Results   Component Value Date    WBC 4.24 04/12/2018    HGB 9.3 (L) 04/12/2018    HCT 28.2 (L) 04/12/2018    MCV 94 04/12/2018     (L) 04/12/2018     No results for input(s): TSH, FREET4 in the last 168 hours.  Lab Results   Component Value Date    HGBA1C 5.1 04/05/2018       Nutritional status:   Body mass index is 21.79 kg/m².  Lab Results   Component Value Date    ALBUMIN 2.2 (L) 04/12/2018    ALBUMIN 2.0 (L) 04/11/2018    ALBUMIN 2.2 (L) 04/10/2018     Lab Results   Component Value Date    PREALBUMIN 13 (L) 04/08/2018    PREALBUMIN 5 (L) 03/15/2018    PREALBUMIN 18 (L) 03/24/2015       Estimated Creatinine Clearance: 42.1 mL/min (A) (based on SCr of 2 mg/dL (H)).    Accu-Checks  Recent Labs      04/10/18   1706  04/10/18   2118  04/11/18   0055  04/11/18   0450  04/11/18   0838  04/11/18   1155  04/11/18   1642  04/11/18   1941  04/12/18   0007  04/12/18   0421   POCTGLUCOSE  356*  271*  290*  279*  243*  263*  336*  249*  275*  335*       Current Medications and/or Treatments Impacting Glycemic Control  Immunotherapy:  Immunosuppressants         Stop Route Frequency     tacrolimus (PROGRAF) 1 mg/mL oral syringe      -- PER G TUBE Daily     tacrolimus (PROGRAF) 1 mg/mL oral syringe      -- PER G TUBE Daily        Steroids:   Hormones     Start     Stop Route Frequency Ordered    04/06/18 0900  predniSONE tablet 7.5 mg      -- PER G TUBE Daily 04/05/18 0926        Pressors:    Autonomic Drugs     Start     Stop Route Frequency Ordered    04/09/18 1500  midodrine tablet 2.5 mg      -- Oral 3 times daily 04/09/18 1006        Hyperglycemia/Diabetes Medications: Antihyperglycemics     Start     Stop  Route Frequency Ordered    04/11/18 0015  insulin regular (Humulin R) 100 Units in sodium chloride 0.9% 100 mL infusion     Question:  Insulin Rate Adjustment (DO NOT MODIFY ANSWER)  Answer:  [email or web address] [id number]    -- IV Continuous 04/10/18 2313    04/10/18 2221  insulin aspart U-100 pen 0-4 Units      -- SubQ Every 4 hours PRN 04/10/18 2122          ASSESSMENT and PLAN    Type 1 diabetes mellitus with stage 3 chronic kidney disease    BG goal 140-1180     Globally elevated.  Unclear of etiology of increasing glucose (possibly better absorption of TF, immunosupressants causing some insulin resistance).    Recommend increasing transition insulin gtt to 2.2 u/hr, low correction q4hr while on TF  Step down parameters 1.7u/h and 1.2u/h.     Pt is T1DM, do not suspend insulin >1 hr   If TF held, decrease insulin rate to 0.2u/hr - known to have controlled BG on basal needs of lev 5 daily/ 0.2u/hr during this hospitalization        Discharge Recommendations:  TBD.        On enteral nutrition    TF at goal.  May increase insulin needs        Current chronic use of systemic steroids    On PDN 7.5mg daily  Can increase insulin requirement        CKD (chronic kidney disease), stage IV    Titrate cautiously.  Caution with insulin stacking.  Avoid hypoglycemia.        Hypothyroidism due to Hashimoto's thyroiditis    Continue Lt4 137mcg per G tube    Ideally should hold TF 1 hr before and 1 hr after given LT4      Recheck TFTs in 4 weeks (around 5/6)          Heart transplanted    Per transplant  Avoid hypoglycemia  On PDN and prograf - could lead to prandial elevations and insulin resistance.            Deonna Larry MD  Endocrinology  [facility]

## 2018-04-12 NOTE — SUBJECTIVE & OBJECTIVE
"Interval HPI:   Overnight events:  AF, tachycardic, low normal BP  On PDN 7.5mg daily     0415 - .  Received 4u correction  0900 - .  Received 4u correction  Insulin gtt at 1.0u/h, still with global elevation  Peak BG at this rate, 390.  TF at goal 50cc/h.   Tolerating well. No nausea    /65 (BP Location: Right arm, Patient Position: Lying)   Pulse (!) 111   Temp 98.3 °F (36.8 °C) (Oral)   Resp 13   Ht 5' 7" (1.702 m)   Wt 63.1 kg (139 lb 1.8 oz)   SpO2 100%   BMI 21.79 kg/m²     Labs Reviewed and Include      Recent Labs  Lab 04/12/18  0412   *   CALCIUM 8.9   ALBUMIN 2.2*   PROT 5.9*   *   K 5.0   CO2 22*      BUN 29*   CREATININE 2.0*   ALKPHOS 188*   ALT 12   AST 25   BILITOT 0.7     Lab Results   Component Value Date    WBC 4.24 04/12/2018    HGB 9.3 (L) 04/12/2018    HCT 28.2 (L) 04/12/2018    MCV 94 04/12/2018     (L) 04/12/2018     No results for input(s): TSH, FREET4 in the last 168 hours.  Lab Results   Component Value Date    HGBA1C 5.1 04/05/2018       Nutritional status:   Body mass index is 21.79 kg/m².  Lab Results   Component Value Date    ALBUMIN 2.2 (L) 04/12/2018    ALBUMIN 2.0 (L) 04/11/2018    ALBUMIN 2.2 (L) 04/10/2018     Lab Results   Component Value Date    PREALBUMIN 13 (L) 04/08/2018    PREALBUMIN 5 (L) 03/15/2018    PREALBUMIN 18 (L) 03/24/2015       Estimated Creatinine Clearance: 42.1 mL/min (A) (based on SCr of 2 mg/dL (H)).    Accu-Checks  Recent Labs      04/10/18   1706  04/10/18   2118  04/11/18   0055  04/11/18   0450  04/11/18   0838  04/11/18   1155  04/11/18   1642  04/11/18   1941  04/12/18   0007  04/12/18   0421   POCTGLUCOSE  356*  271*  290*  279*  243*  263*  336*  249*  275*  335*       Current Medications and/or Treatments Impacting Glycemic Control  Immunotherapy:  Immunosuppressants         Stop Route Frequency     tacrolimus (PROGRAF) 1 mg/mL oral syringe      -- PER G TUBE Daily     tacrolimus (PROGRAF) 1 mg/mL oral " syringe      -- PER G TUBE Daily        Steroids:   Hormones     Start     Stop Route Frequency Ordered    04/06/18 0900  predniSONE tablet 7.5 mg      -- PER G TUBE Daily 04/05/18 0926        Pressors:    Autonomic Drugs     Start     Stop Route Frequency Ordered    04/09/18 1500  midodrine tablet 2.5 mg      -- Oral 3 times daily 04/09/18 1006        Hyperglycemia/Diabetes Medications: Antihyperglycemics     Start     Stop Route Frequency Ordered    04/11/18 0015  insulin regular (Humulin R) 100 Units in sodium chloride 0.9% 100 mL infusion     Question:  Insulin Rate Adjustment (DO NOT MODIFY ANSWER)  Answer:  [email or web address]\Images\[email or web address]\[email or web address] [id number]    -- IV Continuous 04/10/18 2313    04/10/18 2221  insulin aspart U-100 pen 0-4 Units      -- SubQ Every 4 hours PRN 04/10/18 2122

## 2018-04-12 NOTE — PT/OT/SLP PROGRESS
"Speech Language Pathology Treatment    Patient Name:  Lv Crocker   MRN:  [id number]  Admitting Diagnosis: Acute respiratory failure with hypoxia    Recommendations:                 General Recommendations:  Cognitive-linguistic evaluation and One Way Speaking Valve  Diet recommendations: NOTE: Pt has not been assessed by ST for swallow. PMSV orders only. NPO, Liquid Diet Level: NPO   Aspiration Precautions: Continue alternate means of nutrition and Strict aspiration precautions   General Precautions: Standard, aspiration, respiratory  Communication strategies:  go to room if call light pushed and pt mouthing to communicate with tx team.  Pt might benefit from sip/puff call light. PMSV with TX only at this time.     PMSV Precautions: Do not sleep with valve on, only wear with tx at this time, Wash with soap and water    Subjective     SLP reviewed Pt with nurse, nurse confirms Pt with trach downsize to 6, cuffless  Patient presents anxious  He mouths "hot, its hot in here"  Patient's spouse explains, "He's hot then he is cold then he is hot"  Patient goals: "to eat"    Pain/Comfort:  · Pain Rating 1: 0/10  · Pain Rating Post-Intervention 1: 0/10    Objective:     Has the patient been evaluated by SLP for swallowing?   No  Keep patient NPO? Yes   Current Respiratory Status: trach cuffless      Pt seen for speaking valve training. Upon first attempt, Patient presents anxious, with Shiley 6.0 cuffless and trach collar in place, reclined in bed. Pt asking for suctioning.  Nurse and RT notified and SLP explains she will re-attempt following RT.  Upon Second attempt, Patient found awake in bed, alert and cooperative. He explains "they suctioned" via mouthing.  SLP reviews PMSV precautions and Patient is agreeable to trial. SpO2 at 100% prior to initiation of trials. Pt dons PMSV for ~ 12 minutes w/o overt S/S respiratory distress. SpO2 between % while donning valve. Pt mainly aphonic and mouthing t/o trials; " however, Pt with occasional voicing which was noted to be breathy. Patient with sustained phonation on vowel sound ~ 1 second in length 2 of 5 attempts. Patient with severely reduced throat clear while wearing valve. Pt asking for break following trial of 12 minutes, SLP removes valve and returns to case at bedside. SLP educates Pt and spouse on cleaning valve, one-way technology, anatomy of trach, and plans to continue trials with tx only at this time with POC to increase tolerance. Pt and spouse verbalize understanding. No further questions. Oral care provided at end of session per Pt request. Whiteboard current. Nurse notified of findings and SLP POC.     Assessment:     Lv Crocker is a 42 y.o. male with an SLP diagnosis of S/P Trach and One Way Speaking Valve Training.  He presents with increased tolerance of speaking valve s/p trach downsize to Size 6 cuffless. ST to continue to follow.     Goals:    SLP Goals        Problem: SLP Goal    Goal Priority Disciplines Outcome   SLP Goal     SLP Ongoing (interventions implemented as appropriate)   Description:  Speech Language Pathology Goals  Goals expected to be met by 4/13/18    1.  Pt will tolerate PMSV for 3 min w/ no change in respiratory status and fair voicing produced.  2.  Pt will complete further evaluation of cognitive-linguistic communication skills to determine the need for additional goals.                        Plan:     · Patient to be seen:  5 x/week   · Plan of Care expires:  05/05/18  · Plan of Care reviewed with:  patient, friend   · SLP Follow-Up:  Yes       Discharge recommendations:  LTACH (long term acute care hospital)   Barriers to Discharge:  Level of Skilled Assistance Needed    Time Tracking:     SLP Treatment Date:   04/12/18  Speech Start Time:  1133  Speech Stop Time:  1158     Speech Total Time (min):  25 min    Billable Minutes: Therapeutic Speech Device 25    JOSELYN Delarosa., CCC-SLP  Speech-Language  Pathology  Pager: [phone]      04/12/2018

## 2018-04-12 NOTE — PLAN OF CARE
Problem: Patient Care Overview  Goal: Plan of Care Review  Outcome: Ongoing (interventions implemented as appropriate)  No acute events throughout day. See vital signs and assessments in flowsheets. See below for updates on today's progress.     Pulmonary: Saturating well at % on trache collar at 5LPM, 28%, speech therapist came and said he did better now with smaller trache size.    Cardiovascular: CVP 10-11, BP /60-70, -110 Sinus tachy.    Neurological: Alert, obeys command, calm    Gastrointestinal: Less leak noted on Peg stoma (ascites). Retention less than 10ml. Flexiseal removed due to complains of discomfort (PRN pain med administered)    Genitourinary: Anuric. No HD today.    Endocrine: High Cbg levels noted. Given additional bolus IV insulin.    Integumentary/Other: Noted heel discoloration, applied heel foams and heel boots continued, with SCD, applied foam dressing on peeling skin on right and left legs. To begin application of NTG paste on right toes discoloration.    Infusions: Insulin at 2.2 units/hr    POC: Sugar control and diurese. To continue speech therapy and PT.    Patient progressing towards goals as tolerated, plan of care communicated and reviewed with Lv Crocker and family. All concerns addressed. Will continue to monitor.

## 2018-04-12 NOTE — PROGRESS NOTES
[facility]  Nephrology  Progress Note    Patient Name: Lv Crocker  MRN: [id number]  Admission Date: 3/11/2018  Hospital Length of Stay: 31 days  Attending Provider: Kanika Ferreira MD   Primary Care Physician: Philippe Mohr MD  Principal Problem:Acute respiratory failure with hypoxia    Subjective:     HPI: Mr. Crocker is a 43 yo WM with T1DM, Hashimoto thyroiditis, h/o OHTx 11/2014, and CKD stage 4 who was admitted on 3/11/18 for persistent diarrhea. He reported a 3 week history of diarrhea up to 15x/day. Associated symptoms including URI symptoms, coughing fits, and coughing syncope. Prograf level was 14.3. His post-heart transplant course has been complicated by [facility]R 4/2015, CMV, post-transplant restrictive cardiomyopathy, recurrent pleural effusions/ascites requiring monthly thoracenteses/paracenteses, VATS 1/11/18 with Pleur-X catheter (now removed), and CKD stage 4 with baseline sCr 2.6-3.0. Baseline -120s and baseline BP 90s-110s/60-70s. Upon admission he was started on IVF and diarrhea workup was initiated. On 3/12 he was noted to have decreased UOP despite fluids; NS was increased to 100cc/hr. He was scheduled for a colonoscopy on 3/13 however procedure was cancelled due to hypoxia and fever. He was transferred to the ICU for closer monitoring. He continued to have oliguria overnight. Bladder scan revealed 200cc of urine but patient refused osullivan catheter placement. Wife reports that patient always has a hard time urinating again after osullivan catheters are placed/removed so he refuses them. He remained hypoxic despite FiO2 70% and ABG revealed combined respiratory and metabolic acidosis with pH 7.17. He was started on BiPAP as well as a bicarb infusion at 50cc/hr. ABG with mild improvement. AM labs revealed K 6.2 and he was shifted and given kayexalate.Trialysis catheter was placed this morning and he subsequently developed hypotension and was started on levophed. He was  intubated later this morning. Nephrology consulted for CACHORRO. All history obtained by primary team and chart review as patient was intubated/sedated on exam. Consent for dialysis obtained by patient's wife and placed in chart. Of note, both of patient's parents were on dialysis.     Interval History:   Tolerated 3 hour HD treatment yesterday without complications with a net UF of 1L, net even volume status in 24 hours.  Stable electrolytes this morning, High/normal K.  He voices no complaints, oxygenating well on TC.    Review of patient's allergies indicates:   Allergen Reactions    No known drug allergies      Current Facility-Administered Medications   Medication Frequency    0.9%  NaCl infusion (for blood administration) Q24H PRN    0.9%  NaCl infusion (for blood administration) Q24H PRN    alprazolam ODT dissolvable tablet 0.5 mg TID PRN    chlorhexidine 0.12 % solution 15 mL BID    epoetin jose miguel injection 4,000 Units Every Mon, Wed, Fri    escitalopram oxalate tablet 20 mg Daily    famotidine tablet 20 mg QHS    heparin (porcine) injection 5,000 Units Q12H    insulin aspart U-100 pen 0-4 Units Q4H PRN    insulin regular (Humulin R) 100 Units in sodium chloride 0.9% 100 mL infusion Continuous    levothyroxine tablet 137 mcg Daily    metoclopramide HCl injection 5 mg Q6H PRN    midodrine tablet 2.5 mg TID    nortriptyline capsule 10 mg QHS    oxyCODONE 5 mg/5 mL solution 10 mg Q4H PRN    polyethylene glycol packet 17 g Daily    povidone-iodine 10 % ointment PRN    predniSONE tablet 7.5 mg Daily    sennosides 8.8 mg/5 ml syrup 5 mL QHS    tacrolimus (PROGRAF) 1 mg/mL oral syringe Daily    tacrolimus (PROGRAF) 1 mg/mL oral syringe Daily       Objective:     Vital Signs (Most Recent):  Temp: 98.3 °F (36.8 °C) (04/12/18 0700)  Pulse: 110 (04/12/18 1000)  Resp: 17 (04/12/18 1000)  BP: 104/64 (04/12/18 1000)  SpO2: 100 % (04/12/18 1000)  O2 Device (Oxygen Therapy): Trach Collar (04/12/18 1000) Vital  Signs (24h Range):  Temp:  [97.5 °F (36.4 °C)-98.3 °F (36.8 °C)] 98.3 °F (36.8 °C)  Pulse:  [105-115] 110  Resp:  [6-21] 17  SpO2:  [100 %] 100 %  BP: ()/(57-68) 104/64     Weight: 63.1 kg (139 lb 1.8 oz) (04/12/18 0332)  Body mass index is 21.79 kg/m².  Body surface area is 1.73 meters squared.    I/O last 3 completed shifts:  In: 3616.8 [I.V.:32; Blood:554.8; Other:1000; NG/GT:2030]  Out: 2820 [Other:2600; Stool:220]    Physical Exam   Constitutional: He appears well-developed and well-nourished. No distress.   HENT:   Head: Normocephalic and atraumatic.   Eyes: Conjunctivae and EOM are normal.   Cardiovascular: Regular rhythm.  Tachycardia present.    Pulmonary/Chest: He has no wheezes. He has no rales.   Abdominal: Soft. He exhibits no distension.   Musculoskeletal: He exhibits edema. He exhibits no tenderness or deformity.   Neurological: He is alert.   Skin: Skin is warm and dry. He is not diaphoretic.       Significant Labs:  CBC:   Recent Labs  Lab 04/12/18  0412   WBC 4.24   RBC 3.01*   HGB 9.3*   HCT 28.2*   *   MCV 94   MCH 30.9   MCHC 33.0     CMP:   Recent Labs  Lab 04/12/18  0412   *   CALCIUM 8.9   ALBUMIN 2.2*   PROT 5.9*   *   K 5.0   CO2 22*      BUN 29*   CREATININE 2.0*   ALKPHOS 188*   ALT 12   AST 25   BILITOT 0.7            Assessment/Plan:     Acute renal failure superimposed on stage 4 chronic kidney disease    - baseline sCr 2.6-3.0 consistent with CKD stage IV  - anuric CACHORRO; likely ischemic ATN from prolonged pre-renal state (diarrhea) in setting of prograf renal vasoconstriction; cannot r/o septic ATN or Prograf toxicity  - SLED started 3/14. TDC placed 4/4/18.   - remains anuric  - Blood pressure have been stable today   -Tolerated HD treatment well on 04/12 with 1L net fluid removal.    Plan:  -will hold RRT today.  -plan for next treatment tomorrow.  Will continue DAQUAN with hemodialysis treatments.  -recommend to switch Glucerna to Nepro, as this is  likely causing an increase in his K between dialysis sessions.  -Phos level in AM            Juaquin Ibrahim NP  Nephrology  [facility]  Pager:  [phone]          I have reviewed and concur with the NP's history, physical, assessment, and plan. I have personally interviewed and examined the patient at bedside.

## 2018-04-12 NOTE — PROGRESS NOTES
Bedside HD completed. 1600 ml fluid removed. Left tunneled Sub Clav CVC fludhed with NS and capped. Patient tolerated tx well.

## 2018-04-12 NOTE — ASSESSMENT & PLAN NOTE
Per transplant  Avoid hypoglycemia  On PDN and prograf - could lead to prandial elevations and insulin resistance.

## 2018-04-12 NOTE — ASSESSMENT & PLAN NOTE
BG goal 140-1180     Globally elevated.  Unclear of etiology of increasing glucose (possibly better absorption of TF, immunosupressants causing some insulin resistance).    Recommend increasing transition insulin gtt to 2.2 u/hr, low correction q4hr while on TF  Step down parameters 1.7u/h and 1.2u/h.     Pt is T1DM, do not suspend insulin >1 hr   If TF held, decrease insulin rate to 0.2u/hr - known to have controlled BG on basal needs of lev 5 daily/ 0.2u/hr during this hospitalization        Discharge Recommendations:  TBD.

## 2018-04-12 NOTE — ASSESSMENT & PLAN NOTE
- baseline sCr 2.6-3.0 consistent with CKD stage IV  - anuric CACHORRO; likely ischemic ATN from prolonged pre-renal state (diarrhea) in setting of prograf renal vasoconstriction; cannot r/o septic ATN or Prograf toxicity  - SLED started 3/14. TDC placed 4/4/18.   - remains anuric  - Blood pressure have been stable today   -Tolerated HD treatment well on 04/12 with 1L net fluid removal.    Plan:  -will hold RRT today.  -plan for next treatment tomorrow.  Will continue DAQUAN with hemodialysis treatments.  -recommend to switch Glucerna to Nepro, as this is likely causing an increase in his K between dialysis sessions.  -Phos level in AM

## 2018-04-12 NOTE — PLAN OF CARE
Problem: SLP Goal  Goal: SLP Goal  Speech Language Pathology Goals  Goals expected to be met by 4/13/18    1.  Pt will tolerate PMSV for 3 min w/ no change in respiratory status and fair voicing produced.  2.  Pt will complete further evaluation of cognitive-linguistic communication skills to determine the need for additional goals.       Outcome: Ongoing (interventions implemented as appropriate)  Pt progressing with goals. Pt with increased tolerance of PMSV s/p trach downsize to Shiley 6.0. Pt mostly aphonic, with intermittent breathy vocal quality,while wearing valve this service day. ST to continue to follow. Please see note for full details. Thank you.    JOSELYN Delarosa., [facility]  Speech-Language Pathology  Pager: [phone]  4/12/2018

## 2018-04-12 NOTE — NURSING
Rounds Report: Attended interdisciplinary rounds this morning with the transplant team including SW, physicians, fellows,  mid-level providers, and transplant coordinators.  Discussed plan of care, including DC date in 2 weeks. Downsized trach yesterday, on trach collar x 2 nights. Looking into LTACs.

## 2018-04-12 NOTE — SUBJECTIVE & OBJECTIVE
Interval History: patient tolerated HD overnight, received 2 units PRBC with appropriate response     Continuous Infusions:   insulin (HUMAN R) infusion (adults) 2.2 Units/hr (04/12/18 1300)     Scheduled Meds:   chlorhexidine  15 mL Mouth/Throat BID    epoetin jose miguel (PROCRIT) injection  4,000 Units Intravenous Every Mon, Wed, Fri    escitalopram oxalate  20 mg Per G Tube Daily    famotidine  20 mg Per G Tube QHS    heparin (porcine)  5,000 Units Subcutaneous Q12H    levothyroxine  137 mcg Per G Tube Daily    midodrine  2.5 mg Oral TID    nitroGLYCERIN 2% TD oint  0.5 inch Topical (Top) Q12H    nortriptyline  10 mg Per OG tube QHS    polyethylene glycol  17 g Oral Daily    predniSONE  7.5 mg Per G Tube Daily    sennosides 8.8 mg/5 ml  5 mL Oral QHS    tacrolimus  3 mg Per G Tube Daily    tacrolimus  4 mg Per G Tube Daily     PRN Meds:sodium chloride, sodium chloride, alprazolam ODT, insulin aspart U-100, metoclopramide HCl, oxyCODONE, povidone-iodine    Review of patient's allergies indicates:   Allergen Reactions    No known drug allergies      Objective:     Vital Signs (Most Recent):  Temp: 98.3 °F (36.8 °C) (04/12/18 1100)  Pulse: 108 (04/12/18 1300)  Resp: 14 (04/12/18 1300)  BP: 99/63 (04/12/18 1300)  SpO2: 100 % (04/12/18 1300) Vital Signs (24h Range):  Temp:  [97.5 °F (36.4 °C)-98.3 °F (36.8 °C)] 98.3 °F (36.8 °C)  Pulse:  [105-115] 108  Resp:  [9-21] 14  SpO2:  [100 %] 100 %  BP: ()/(57-68) 99/63     Patient Vitals for the past 72 hrs (Last 3 readings):   Weight   04/12/18 0332 63.1 kg (139 lb 1.8 oz)   04/11/18 0400 63.2 kg (139 lb 5.3 oz)   04/10/18 1000 63.1 kg (139 lb 1.8 oz)     Body mass index is 21.79 kg/m².      Intake/Output Summary (Last 24 hours) at 04/12/18 1403  Last data filed at 04/12/18 1300   Gross per 24 hour   Intake          2881.85 ml   Output             2890 ml   Net            -8.15 ml       Hemodynamic Parameters:       Physical Exam   Constitutional: He appears  well-developed and well-nourished. No distress.   HENT:   Head: Normocephalic and atraumatic.   Eyes: Conjunctivae and EOM are normal.   Cardiovascular: Regular rhythm.  Tachycardia present.    Pulmonary/Chest: He has no wheezes. He has no rales.   Abdominal: Soft. He exhibits no distension.   Musculoskeletal: He exhibits edema. He exhibits no tenderness or deformity.   Neurological: He is alert.   Skin: Skin is warm and dry. He is not diaphoretic.       Significant Labs:  CBC:    Recent Labs  Lab 04/10/18  0409 04/11/18  0439 04/12/18  0412   WBC 3.65* 3.19* 4.24   RBC 2.16* 1.96* 3.01*   HGB 6.8* 6.3* 9.3*   HCT 21.4* 20.0* 28.2*   * 112* 147*   MCV 99* 102* 94   MCH 31.5* 32.1* 30.9   MCHC 31.8* 31.5* 33.0     BNP:  No results for input(s): BNP in the last 168 hours.    Invalid input(s): BNPTRIAGELBLO  CMP:    Recent Labs  Lab 04/10/18  0409 04/11/18  0439 04/12/18  0412   * 241* 390*   CALCIUM 8.5* 8.5* 8.9   ALBUMIN 2.2* 2.0* 2.2*   PROT 5.3* 5.2* 5.9*   * 135* 134*   K 4.6 4.9 5.0   CO2 27 29 22*   CL 97 98 101   BUN 26* 45* 29*   CREATININE 1.7* 2.8* 2.0*   ALKPHOS 183* 165* 188*   ALT 11 8* 12   AST 20 16 25   BILITOT 0.7 0.5 0.7      Coagulation:   No results for input(s): PT, INR, APTT in the last 168 hours.  LDH:  No results for input(s): LDH in the last 72 hours.  Microbiology:  Microbiology Results (last 7 days)     Procedure Component Value Units Date/Time    Fungus culture [[id number]] Collected:  03/28/18 1607    Order Status:  Completed Specimen:  Body Fluid from Lung, RLL Updated:  04/12/18 0936     Fungus (Mycology) Culture Culture in progress     Fungus (Mycology) Culture No fungus isolated after 2 weeks          I have reviewed all pertinent labs within the past 24 hours.    Estimated Creatinine Clearance: 42.1 mL/min (A) (based on SCr of 2 mg/dL (H)).    Diagnostic Results:  I have reviewed and interpreted all pertinent imaging results/findings within the past 24 hours.

## 2018-04-12 NOTE — PROGRESS NOTES
[facility]  Heart Transplant  Progress Note    Patient Name: Lv Crocker  MRN: [id number]  Admission Date: 3/11/2018  Hospital Length of Stay: 31 days  Attending Physician: Kanika Ferreira MD  Primary Care Provider: Philippe Mohr MD  Principal Problem:Acute respiratory failure with hypoxia    Subjective:     Interval History: patient tolerated HD overnight, received 2 units PRBC with appropriate response     Continuous Infusions:   insulin (HUMAN R) infusion (adults) 2.2 Units/hr (04/12/18 1300)     Scheduled Meds:   chlorhexidine  15 mL Mouth/Throat BID    epoetin jose mgiuel (PROCRIT) injection  4,000 Units Intravenous Every Mon, Wed, Fri    escitalopram oxalate  20 mg Per G Tube Daily    famotidine  20 mg Per G Tube QHS    heparin (porcine)  5,000 Units Subcutaneous Q12H    levothyroxine  137 mcg Per G Tube Daily    midodrine  2.5 mg Oral TID    nitroGLYCERIN 2% TD oint  0.5 inch Topical (Top) Q12H    nortriptyline  10 mg Per OG tube QHS    polyethylene glycol  17 g Oral Daily    predniSONE  7.5 mg Per G Tube Daily    sennosides 8.8 mg/5 ml  5 mL Oral QHS    tacrolimus  3 mg Per G Tube Daily    tacrolimus  4 mg Per G Tube Daily     PRN Meds:sodium chloride, sodium chloride, alprazolam ODT, insulin aspart U-100, metoclopramide HCl, oxyCODONE, povidone-iodine    Review of patient's allergies indicates:   Allergen Reactions    No known drug allergies      Objective:     Vital Signs (Most Recent):  Temp: 98.3 °F (36.8 °C) (04/12/18 1100)  Pulse: 108 (04/12/18 1300)  Resp: 14 (04/12/18 1300)  BP: 99/63 (04/12/18 1300)  SpO2: 100 % (04/12/18 1300) Vital Signs (24h Range):  Temp:  [97.5 °F (36.4 °C)-98.3 °F (36.8 °C)] 98.3 °F (36.8 °C)  Pulse:  [105-115] 108  Resp:  [9-21] 14  SpO2:  [100 %] 100 %  BP: ()/(57-68) 99/63     Patient Vitals for the past 72 hrs (Last 3 readings):   Weight   04/12/18 0332 63.1 kg (139 lb 1.8 oz)   04/11/18 0400 63.2 kg (139 lb 5.3 oz)   04/10/18  1000 63.1 kg (139 lb 1.8 oz)     Body mass index is 21.79 kg/m².      Intake/Output Summary (Last 24 hours) at 04/12/18 1403  Last data filed at 04/12/18 1300   Gross per 24 hour   Intake          2881.85 ml   Output             2890 ml   Net            -8.15 ml       Hemodynamic Parameters:       Physical Exam   Constitutional: He appears well-developed and well-nourished. No distress.   HENT:   Head: Normocephalic and atraumatic.   Eyes: Conjunctivae and EOM are normal.   Cardiovascular: Regular rhythm.  Tachycardia present.    Pulmonary/Chest: He has no wheezes. He has no rales.   Abdominal: Soft. He exhibits no distension.   Musculoskeletal: He exhibits edema. He exhibits no tenderness or deformity.   Neurological: He is alert.   Skin: Skin is warm and dry. He is not diaphoretic.       Significant Labs:  CBC:    Recent Labs  Lab 04/10/18  0409 04/11/18 0439 04/12/18 0412   WBC 3.65* 3.19* 4.24   RBC 2.16* 1.96* 3.01*   HGB 6.8* 6.3* 9.3*   HCT 21.4* 20.0* 28.2*   * 112* 147*   MCV 99* 102* 94   MCH 31.5* 32.1* 30.9   MCHC 31.8* 31.5* 33.0     BNP:  No results for input(s): BNP in the last 168 hours.    Invalid input(s): BNPTRIAGELBLO  CMP:    Recent Labs  Lab 04/10/18  0409 04/11/18 0439 04/12/18  0412   * 241* 390*   CALCIUM 8.5* 8.5* 8.9   ALBUMIN 2.2* 2.0* 2.2*   PROT 5.3* 5.2* 5.9*   * 135* 134*   K 4.6 4.9 5.0   CO2 27 29 22*   CL 97 98 101   BUN 26* 45* 29*   CREATININE 1.7* 2.8* 2.0*   ALKPHOS 183* 165* 188*   ALT 11 8* 12   AST 20 16 25   BILITOT 0.7 0.5 0.7      Coagulation:   No results for input(s): PT, INR, APTT in the last 168 hours.  LDH:  No results for input(s): LDH in the last 72 hours.  Microbiology:  Microbiology Results (last 7 days)     Procedure Component Value Units Date/Time    Fungus culture [[id number]] Collected:  03/28/18 1607    Order Status:  Completed Specimen:  Body Fluid from Lung, RLL Updated:  04/12/18 0936     Fungus (Mycology) Culture Culture in progress      Fungus (Mycology) Culture No fungus isolated after 2 weeks          I have reviewed all pertinent labs within the past 24 hours.    Estimated Creatinine Clearance: 42.1 mL/min (A) (based on SCr of 2 mg/dL (H)).    Diagnostic Results:  I have reviewed and interpreted all pertinent imaging results/findings within the past 24 hours.    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 Most recently has had ongoing issues with his lungs, had gotten 2 thoracenteses, after which he underwent VATS 01/19/18 followed by pleurex catheter placement and effusion drainage 01/11/18, subsequently had it removed 02/07/18 after drainage had decreased despite multiple attempts to drain it. Has been having significant coughing fits that result in him being near-syncopal at times, although difficult to say if he has passed out or not- patient most recently was admitted to the hospital for increased AGUILAR, coughing and pre[place]syncope [place] [facility]   Patient was started on antibiotics for suspected bacterial infection, with some diarrhea, bronchitis, and possible pneumonia. Ct chest showed some pleural fluid collection and after talking with Dr. James, we arranged for him to receive a diagnostic tap with IR, from which the fluid collection demonstrated no growth or significant findings at all really. Cell counts do not appear to have been sent but will recheck. Patient states since his hospital stay, yesterday had a significant coughing fit again, after which he nearly passed out, is being seen in clinic today for this. Denies any cardiopulmonary complaints, no leg swelling, has some abdominal swelling, which is moderate for him. Denies significant shortness of breath with mild exertion, had 6MWT yesterday to see if he qualified for home  O2, and his O2 sats actually improved after recovery from where he started initially. He and his wife admit he is in bed most days, not very active.     Mr. Crocker presented to the ED 3/11/18 with approximately 3 weeks of worsening diarrhea and 2-3 days of sinus pressure, drainage, and worsened cough.  He notes that he had a coughing fit last night and passed out, coughed throughout most of the night.  This also happened on 2/25/18.  He last saw Dr Ferreira in clinic on 2/20/18 where he was taken off myfortic and switched to cellcept (he hadn't been on cellcept because of leukopenia when they tried post-transplant).  Though his diarrhea had improved after his last discharge, he states it has increased now to 15x/day, clear/yellow/green, no fevers, no exacerbating foods per say.  He was taken back off the cellcept and placed back on myfortic this past week and has not noticed immediate change in diarrhea. He also reports his baseline coughing fits havent improved and are minimally responsive to tessalon pearls.  Came on last night, he lost consciousness during a fit once which is relatively normal for him, and had two more during HPI.  He notes no sick contacts but does state he's had some URI symptoms as above.  His baseline vitals are usually -120 and BP 90s/60s-110s/70s.  He reports a history of intermittent diarrhea - did have Cdiff many years ago but this smells different.  No abdominal pain, no nausea, no vomiting.  No blood in diarrhea.  Appears last c-scope in 2015 with no evidence of infection or inflammatory processes.  He does report IBS which is different that this.       * Acute respiratory failure with hypoxia    - S/P Bronch and intubation 3/14 now post tracheostomy due to inability to extubate   - Pulmonology no long following as patient tolerating trach collar well   - trach downsized by thoracic surgery  - RSV positive early in hospitalization. Completed course of Ribavarin/IVIG. Per lung  transplant protocol for severe RSV (given myelosuppression).   - ID following. Completed 7 day course of Meropenem/Linezolid.   - All cultures remain negative.   - Appreciate PT/OT/Wound care.        Restrictive cardiomyopathy    - Has known history of recurrent ascites and pleural effusions   - S/P thoracentesis X 2, followed by VATS/pleurex cath placement (January 2018) with removal of pleurex (February 2018) and 3rd thoracentesis 2/14/18 with no significant findings on fluid removal.  - CXR from 4/9 stable from prior        Type 1 diabetes mellitus with stage 3 chronic kidney disease    - Appreciate Endocrine's recs  - Insulin gtt per endocrine recs  - Recent blood transfusions will likely affect A1cs (will appear lower than they likely are)         Hypothyroidism due to Hashimoto's thyroiditis    - Appreciate Endocrine's recs  - Continue IV levothyroxine 88mcg        Acute renal failure superimposed on stage 4 chronic kidney disease    - Appreciate Nephrology recs.   - HD done, will receive another trial Friday   midodrine 30 minutes prior to HD. Will hold off on PRBC today as Hgb risen overnight   - permacath placed in OR        Anemia    - transfused one unit 4/4  - 2 units 4/11 during HD  - per nephrology, ok to resume procit        Heart transplanted    - TTE 3/26/18 showed normal graft function but has known history of restrictive CM post transplant.  - Continue pred 7.5, Tacro per G tube 3/3 (increasing/adjusting daily per Pharmacist recs)  - Cellcept remains on hold  - CMV pending  - HD per renal        Debility    - PT/OT daily   - This will take time and require adequate nutrition   - PEG tube placed, tolerating tube feeds well. Nutrition following.        Anxiety    - Pt on nortriptyline/escitalopram at home.  - Continue prn low dose xanax ODT.         Uninterrupted Critical Care/Counseling Time (not including procedures): 30 minutes    JOSE DANIEL Zabala  Heart Transplant  [facility]

## 2018-04-12 NOTE — SUBJECTIVE & OBJECTIVE
Interval History:   Tolerated 3 hour HD treatment yesterday without complications with a net UF of 1L, net even volume status in 24 hours.  Stable electrolytes this morning, High/normal K.  He voices no complaints, oxygenating well on TC.    Review of patient's allergies indicates:   Allergen Reactions    No known drug allergies      Current Facility-Administered Medications   Medication Frequency    0.9%  NaCl infusion (for blood administration) Q24H PRN    0.9%  NaCl infusion (for blood administration) Q24H PRN    alprazolam ODT dissolvable tablet 0.5 mg TID PRN    chlorhexidine 0.12 % solution 15 mL BID    epoetin jose miguel injection 4,000 Units Every Mon, Wed, Fri    escitalopram oxalate tablet 20 mg Daily    famotidine tablet 20 mg QHS    heparin (porcine) injection 5,000 Units Q12H    insulin aspart U-100 pen 0-4 Units Q4H PRN    insulin regular (Humulin R) 100 Units in sodium chloride 0.9% 100 mL infusion Continuous    levothyroxine tablet 137 mcg Daily    metoclopramide HCl injection 5 mg Q6H PRN    midodrine tablet 2.5 mg TID    nortriptyline capsule 10 mg QHS    oxyCODONE 5 mg/5 mL solution 10 mg Q4H PRN    polyethylene glycol packet 17 g Daily    povidone-iodine 10 % ointment PRN    predniSONE tablet 7.5 mg Daily    sennosides 8.8 mg/5 ml syrup 5 mL QHS    tacrolimus (PROGRAF) 1 mg/mL oral syringe Daily    tacrolimus (PROGRAF) 1 mg/mL oral syringe Daily       Objective:     Vital Signs (Most Recent):  Temp: 98.3 °F (36.8 °C) (04/12/18 0700)  Pulse: 110 (04/12/18 1000)  Resp: 17 (04/12/18 1000)  BP: 104/64 (04/12/18 1000)  SpO2: 100 % (04/12/18 1000)  O2 Device (Oxygen Therapy): Trach Collar (04/12/18 1000) Vital Signs (24h Range):  Temp:  [97.5 °F (36.4 °C)-98.3 °F (36.8 °C)] 98.3 °F (36.8 °C)  Pulse:  [105-115] 110  Resp:  [6-21] 17  SpO2:  [100 %] 100 %  BP: ()/(57-68) 104/64     Weight: 63.1 kg (139 lb 1.8 oz) (04/12/18 0332)  Body mass index is 21.79 kg/m².  Body surface area is  1.73 meters squared.    I/O last 3 completed shifts:  In: 3616.8 [I.V.:32; Blood:554.8; Other:1000; NG/GT:2030]  Out: 2820 [Other:2600; Stool:220]    Physical Exam   Constitutional: He appears well-developed and well-nourished. No distress.   HENT:   Head: Normocephalic and atraumatic.   Eyes: Conjunctivae and EOM are normal.   Cardiovascular: Regular rhythm.  Tachycardia present.    Pulmonary/Chest: He has no wheezes. He has no rales.   Abdominal: Soft. He exhibits no distension.   Musculoskeletal: He exhibits edema. He exhibits no tenderness or deformity.   Neurological: He is alert.   Skin: Skin is warm and dry. He is not diaphoretic.       Significant Labs:  CBC:   Recent Labs  Lab 04/12/18 0412   WBC 4.24   RBC 3.01*   HGB 9.3*   HCT 28.2*   *   MCV 94   MCH 30.9   MCHC 33.0     CMP:   Recent Labs  Lab 04/12/18 0412   *   CALCIUM 8.9   ALBUMIN 2.2*   PROT 5.9*   *   K 5.0   CO2 22*      BUN 29*   CREATININE 2.0*   ALKPHOS 188*   ALT 12   AST 25   BILITOT 0.7

## 2018-04-12 NOTE — PLAN OF CARE
Problem: Patient Care Overview  Goal: Plan of Care Review  Outcome: Ongoing (interventions implemented as appropriate)  Shift free of acute events. HD trial tolerated well by patient - 2U PRBC given during HD treatment. Insulin gtt continued at 1U/hr with PRN insulin given q4h. TF tolerated with minimal residuals. Patient had large liquid MD - fecal containment device in place and patent. Patient bathed, on fresh linen, and turned q2h. POC reviewed with patient and spouse, Elizabeth, at bedside - all questions and concerns addressed.

## 2018-04-13 PROBLEM — R19.7 DIARRHEA: Status: ACTIVE | Noted: 2018-01-01

## 2018-04-13 NOTE — NURSING
Prince ABRAHAM notified of K 3.6/Cr 3.0. MD to notify Nephrology inquiring about dialyzing patient today. Asymptomatic. Continuing to monitor.

## 2018-04-13 NOTE — PROGRESS NOTES
Received pt from ICU. Wife at the BS. VSS. Cardiac monitoring in progress. Assessment complete, see EPIC. Pt denies pain or discomfort at this time. Insulin gtt infusing at 1 unit/hr, last bG 238, checked at 1535. TF infusing @ 50 cc/hr to a Peg tube. Oriented to room and call bell. Fall education reviewed with pt. Instructed to call if assistance needed, verbalized understanding. Will continue to monitor.

## 2018-04-13 NOTE — PROGRESS NOTES
[facility]  Heart Transplant  Progress Note    Patient Name: Lv Crocker  MRN: [id number]  Admission Date: 3/11/2018  Hospital Length of Stay: 32 days  Attending Physician: Kanika Ferreira MD  Primary Care Provider: Philippe Mohr MD  Principal Problem:Acute respiratory failure with hypoxia    Subjective:     Interval History: patient without issues overnight, still NPO per speech     Continuous Infusions:   insulin (HUMAN R) infusion (adults) 1 Units/hr (04/13/18 1400)     Scheduled Meds:   chlorhexidine  15 mL Mouth/Throat BID    epoetin jose miguel (PROCRIT) injection  4,000 Units Intravenous Every Mon, Wed, Fri    escitalopram oxalate  20 mg Per G Tube Daily    famotidine  20 mg Per G Tube QHS    heparin (porcine)  5,000 Units Subcutaneous Q12H    levothyroxine  137 mcg Per G Tube Daily    midodrine  2.5 mg Oral TID    nitroGLYCERIN 2% TD oint  0.5 inch Topical (Top) Q12H    nortriptyline  10 mg Per OG tube QHS    oxyCODONE  10 mg Per G Tube Q12H    polyethylene glycol  17 g Oral Daily    predniSONE  7.5 mg Per G Tube Daily    sennosides 8.8 mg/5 ml  5 mL Oral QHS    tacrolimus  3 mg Per G Tube Daily    tacrolimus  4 mg Per G Tube Daily     PRN Meds:sodium chloride, sodium chloride, alprazolam ODT, heparin (porcine), insulin aspart U-100, metoclopramide HCl, midodrine, povidone-iodine    Review of patient's allergies indicates:   Allergen Reactions    No known drug allergies      Objective:     Vital Signs (Most Recent):  Temp: 98.1 °F (36.7 °C) (04/13/18 1100)  Pulse: (!) 118 (04/13/18 1430)  Resp: (!) 8 (04/13/18 1430)  BP: (!) 93/50 (04/13/18 1430)  SpO2: 95 % (04/13/18 1430) Vital Signs (24h Range):  Temp:  [97.8 °F (36.6 °C)-98.4 °F (36.9 °C)] 98.1 °F (36.7 °C)  Pulse:  [104-120] 118  Resp:  [4-24] 8  SpO2:  [94 %-100 %] 95 %  BP: ()/(49-78) 93/50     Patient Vitals for the past 72 hrs (Last 3 readings):   Weight   04/12/18 0332 63.1 kg (139 lb 1.8 oz)   04/11/18  0400 63.2 kg (139 lb 5.3 oz)     Body mass index is 21.79 kg/m².      Intake/Output Summary (Last 24 hours) at 04/13/18 1451  Last data filed at 04/13/18 1400   Gross per 24 hour   Intake          2040.46 ml   Output             2050 ml   Net            -9.54 ml       Hemodynamic Parameters:         Physical Exam   Constitutional: He appears well-developed and well-nourished. No distress.   HENT:   Head: Normocephalic and atraumatic.   Eyes: Conjunctivae and EOM are normal.   Cardiovascular: Regular rhythm.  Tachycardia present.    Pulmonary/Chest: He has no wheezes. He has no rales.   Abdominal: Soft. He exhibits no distension.   Musculoskeletal: He exhibits edema. He exhibits no tenderness or deformity.   Neurological: He is alert.   Skin: Skin is warm and dry. He is not diaphoretic.       Significant Labs:  CBC:    Recent Labs  Lab 04/11/18  0439 04/12/18  0412 04/13/18  0409   WBC 3.19* 4.24 5.03   RBC 1.96* 3.01* 3.01*   HGB 6.3* 9.3* 9.3*   HCT 20.0* 28.2* 28.7*   * 147* 168   * 94 95   MCH 32.1* 30.9 30.9   MCHC 31.5* 33.0 32.4     BNP:  No results for input(s): BNP in the last 168 hours.    Invalid input(s): BNPTRIAGELBLO  CMP:    Recent Labs  Lab 04/11/18  0439 04/12/18 0412 04/13/18  0409   * 390* 122*   CALCIUM 8.5* 8.9 9.2   ALBUMIN 2.0* 2.2* 2.3*   PROT 5.2* 5.9* 6.1   * 134* 135*   K 4.9 5.0 5.6*   CO2 29 22* 25   CL 98 101 102   BUN 45* 29* 52*   CREATININE 2.8* 2.0* 3.0*   ALKPHOS 165* 188* 185*   ALT 8* 12 10   AST 16 25 17   BILITOT 0.5 0.7 0.5      Coagulation:   No results for input(s): PT, INR, APTT in the last 168 hours.  LDH:  No results for input(s): LDH in the last 72 hours.  Microbiology:  Microbiology Results (last 7 days)     Procedure Component Value Units Date/Time    Fungus culture [[id number]] Collected:  03/28/18 1607    Order Status:  Completed Specimen:  Body Fluid from Lung, RLL Updated:  04/12/18 0936     Fungus (Mycology) Culture Culture in progress      Fungus (Mycology) Culture No fungus isolated after 2 weeks          I have reviewed all pertinent labs within the past 24 hours.    Estimated Creatinine Clearance: 28 mL/min (A) (based on SCr of 3 mg/dL (H)).    Diagnostic Results:  I have reviewed and interpreted all pertinent imaging results/findings within the past 24 hours.    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 Most recently has had ongoing issues with his lungs, had gotten 2 thoracenteses, after which he underwent VATS 01/19/18 followed by pleurex catheter placement and effusion drainage 01/11/18, subsequently had it removed 02/07/18 after drainage had decreased despite multiple attempts to drain it. Has been having significant coughing fits that result in him being near-syncopal at times, although difficult to say if he has passed out or not- patient most recently was admitted to the hospital for increased AGUILAR, coughing and pre-syncope 02/11-02/14/18 at [facility].   Patient was started on antibiotics for suspected bacterial infection, with some diarrhea, bronchitis, and possible pneumonia. Ct chest showed some pleural fluid collection and after talking with Dr. James, we arranged for him to receive a diagnostic tap with IR, from which the fluid collection demonstrated no growth or significant findings at all really. Cell counts do not appear to have been sent but will recheck. Patient states since his hospital stay, yesterday had a significant coughing fit again, after which he nearly passed out, is being seen in clinic today for this. Denies any cardiopulmonary complaints, no leg swelling, has some abdominal swelling, which is moderate for him. Denies significant shortness of breath with mild exertion, had 6MWT yesterday to see if he qualified for home O2,  and his O2 sats actually improved after recovery from where he started initially. He and his wife admit he is in bed most days, not very active.     Mr. Crocker presented to the ED 3/11/18 with approximately 3 weeks of worsening diarrhea and 2-3 days of sinus pressure, drainage, and worsened cough.  He notes that he had a coughing fit last night and passed out, coughed throughout most of the night.  This also happened on 2/25/18.  He last saw Dr Ferreira in clinic on 2/20/18 where he was taken off myfortic and switched to cellcept (he hadn't been on cellcept because of leukopenia when they tried post-transplant).  Though his diarrhea had improved after his last discharge, he states it has increased now to 15x/day, clear/yellow/green, no fevers, no exacerbating foods per say.  He was taken back off the cellcept and placed back on myfortic this past week and has not noticed immediate change in diarrhea. He also reports his baseline coughing fits havent improved and are minimally responsive to tessalon pearls.  Came on last night, he lost consciousness during a fit once which is relatively normal for him, and had two more during HPI.  He notes no sick contacts but does state he's had some URI symptoms as above.  His baseline vitals are usually -120 and BP 90s/60s-110s/70s.  He reports a history of intermittent diarrhea - did have Cdiff many years ago but this smells different.  No abdominal pain, no nausea, no vomiting.  No blood in diarrhea.  Appears last c-scope in 2015 with no evidence of infection or inflammatory processes.  He does report IBS which is different that this.       * Acute respiratory failure with hypoxia    - S/P Bronch and intubation 3/14 now post tracheostomy due to inability to extubate   - Pulmonology no long following as patient tolerating trach collar well   - will reach out to thoracic surgery about downsizing trach  - RSV positive early in hospitalization. Completed course of  Ribavarin/IVIG. Per lung transplant protocol for severe RSV (given myelosuppression).   - ID following. Completed 7 day course of Meropenem/Linezolid.   - All cultures remain negative.   - Appreciate PT/OT/Wound care.        Restrictive cardiomyopathy    - Has known history of recurrent ascites and pleural effusions   - S/P thoracentesis X 2, followed by VATS/pleurex cath placement (January 2018) with removal of pleurex (February 2018) and 3rd thoracentesis 2/14/18 with no significant findings on fluid removal.  - CXR from 4/9 stable from prior        Type 1 diabetes mellitus with stage 3 chronic kidney disease    - Appreciate Endocrine's recs  - Insulin gtt per endocrine recs  - Recent blood transfusions will likely affect A1cs (will appear lower than they likely are)         Hypothyroidism due to Hashimoto's thyroiditis    - Appreciate Endocrine's recs  - Continue IV levothyroxine 88mcg        Acute renal failure superimposed on stage 4 chronic kidney disease    - Appreciate Nephrology recs.   - Intermittent CRRT. Added middorine for pressure support during CRRT in hopes of tolerating HD in future.   - plan to try HD today. On midodrine and will adjust ti give midodrine 30 minutes prior to HD. +2 U PRBCs today  - permacath placed in OR        Anemia    - transfused one unit 4/4  - 2 units 4/11 during HD  - per nephrology, ok to resume procit        Heart transplanted    - TTE 3/26/18 showed normal graft function but has known history of restrictive CM post transplant.  - Continue pred 7.5, Tacro per G tube 3/3 (increasing/adjusting daily per Pharmacist recs)  - Cellcept remains on hold  - CMV pending  - CVP 10 today, HD planned for today        Debility    - PT/OT daily   - This will take time and require adequate nutrition   - PEG tube placed, tolerating tube feeds well. Nutrition following.        Anxiety    - Pt on nortriptyline/escitalopram at home.  - Continue prn low dose xanax ODT.             Estrellita SERRATO  JOSE DANIEL Callahan  Heart Transplant  [facility]

## 2018-04-13 NOTE — SUBJECTIVE & OBJECTIVE
"Interval History:   Mild hyperkalemia this morning @ 5.6, otherwise, electrolytes stable.  Remains anuric, net positive 1.3L/24h.  Worked with PT this morning, c/o being "tired".  On HD at time of exam.    Review of patient's allergies indicates:   Allergen Reactions    No known drug allergies      Current Facility-Administered Medications   Medication Frequency    0.9%  NaCl infusion (for blood administration) Q24H PRN    0.9%  NaCl infusion (for blood administration) Q24H PRN    albumin human 25% bottle 50 g Once    alprazolam ODT dissolvable tablet 0.5 mg TID PRN    chlorhexidine 0.12 % solution 15 mL BID    epoetin jose miguel injection 4,000 Units Every Mon, Wed, Fri    escitalopram oxalate tablet 20 mg Daily    famotidine tablet 20 mg QHS    heparin (porcine) injection 5,000 Units Q12H    insulin aspart U-100 pen 0-4 Units Q4H PRN    insulin regular (Humulin R) 100 Units in sodium chloride 0.9% 100 mL infusion Continuous    levothyroxine tablet 137 mcg Daily    metoclopramide HCl injection 5 mg Q6H PRN    midodrine tablet 10 mg PRN    midodrine tablet 2.5 mg TID    nitroGLYCERIN 2% TD oint ointment 0.5 inch Q12H    nortriptyline capsule 10 mg QHS    oxyCODONE 5 mg/5 mL solution 10 mg Q4H PRN    polyethylene glycol packet 17 g Daily    povidone-iodine 10 % ointment PRN    predniSONE tablet 7.5 mg Daily    sennosides 8.8 mg/5 ml syrup 5 mL QHS    tacrolimus (PROGRAF) 1 mg/mL oral syringe Daily    tacrolimus (PROGRAF) 1 mg/mL oral syringe Daily       Objective:     Vital Signs (Most Recent):  Temp: 98.4 °F (36.9 °C) (04/13/18 0800)  Pulse: (!) 120 (04/13/18 0800)  Resp: (!) 22 (04/13/18 0800)  BP: 100/63 (04/13/18 0800)  SpO2: (!) 94 % (04/13/18 0800)  O2 Device (Oxygen Therapy): Trach Collar (04/13/18 9380) Vital Signs (24h Range):  Temp:  [97.8 °F (36.6 °C)-98.4 °F (36.9 °C)] 98.4 °F (36.9 °C)  Pulse:  [104-120] 120  Resp:  [4-31] 22  SpO2:  [94 %-100 %] 94 %  BP: ()/(58-78) 100/63 "     Weight: 63.1 kg (139 lb 1.8 oz) (04/12/18 0332)  Body mass index is 21.79 kg/m².  Body surface area is 1.73 meters squared.    I/O last 3 completed shifts:  In: 3516.6 [I.V.:61.9; Blood:554.8; Other:1000; NG/GT:1900]  Out: 2890 [Other:2600; Stool:290]    Physical Exam   Constitutional: He appears well-developed and well-nourished. No distress.   HENT:   Head: Normocephalic and atraumatic.   Eyes: Conjunctivae and EOM are normal.   Cardiovascular: Regular rhythm.  Tachycardia present.    Pulmonary/Chest: He has no wheezes. He has no rales.   Abdominal: Soft. He exhibits no distension.   Musculoskeletal: He exhibits edema. He exhibits no tenderness or deformity.   Neurological: He is alert.   Skin: Skin is warm and dry. He is not diaphoretic.       Significant Labs:  CBC:   Recent Labs  Lab 04/13/18  0409   WBC 5.03   RBC 3.01*   HGB 9.3*   HCT 28.7*      MCV 95   MCH 30.9   MCHC 32.4     CMP:   Recent Labs  Lab 04/13/18  0409   *   CALCIUM 9.2   ALBUMIN 2.3*   PROT 6.1   *   K 5.6*   CO2 25      BUN 52*   CREATININE 3.0*   ALKPHOS 185*   ALT 10   AST 17   BILITOT 0.5

## 2018-04-13 NOTE — ASSESSMENT & PLAN NOTE
BG goal 140-1180     Previously globally elevated.  Unclear of etiology of increasing glucose (possibly better absorption of TF, immunosupressants causing some insulin resistance).    Yesterday, did not place orders for IIP, which would have allowed for q1h accuchecks.  Transition insulin gtt remained at 2.2 u/hr, low correction q4hr while on TF  Accucheck 122/130s - without adjustment to appropriate parameters.  Received 1/2 amp. Subsequent normal BG.      Adjusted transition drip to 1.0u/h.  Will continue to monitor patient closely.    Pt is T1DM, do not suspend insulin >1 hr   If TF held, decrease insulin rate to 0.2u/hr - known to have controlled BG on basal needs of lev 5 daily/ 0.2u/hr during this hospitalization        Discharge Recommendations:  TBD.

## 2018-04-13 NOTE — NURSING TRANSFER
Nursing Transfer Note      4/13/2018     Transfer to [facility] from [facility]    Transfer via bed    Transfer with tele box & IV pole    Transported by RNx1 and PCT x1    Medicines sent: Yes, given to SANFORD Alanis    Chart send with patient: Yes, given to SANFORD Alanis    Notified: Pt wife at bedside

## 2018-04-13 NOTE — PROGRESS NOTES
[facility]  Nephrology  Progress Note    Patient Name: Lv Crocker  MRN: [id number]  Admission Date: 3/11/2018  Hospital Length of Stay: 32 days  Attending Provider: Kanika Ferreira MD   Primary Care Physician: Philippe Mohr MD  Principal Problem:Acute respiratory failure with hypoxia    Subjective:     HPI: Mr. Crocker is a 43 yo WM with T1DM, Hashimoto thyroiditis, h/o OHTx 11/2014, and CKD stage 4 who was admitted on 3/11/18 for persistent diarrhea. He reported a 3 week history of diarrhea up to 15x/day. Associated symptoms including URI symptoms, coughing fits, and coughing syncope. Prograf level was 14.3. His post-heart transplant course has been complicated by AMR 4/2015, CMV, post-transplant restrictive cardiomyopathy, recurrent pleural effusions/ascites requiring monthly thoracenteses/paracenteses, VATS 1/11/18 with Pleur-X catheter (now removed), and CKD stage 4 with baseline sCr 2.6-3.0. Baseline -120s and baseline BP 90s-110s/60-70s. Upon admission he was started on IVF and diarrhea workup was initiated. On 3/12 he was noted to have decreased UOP despite fluids; NS was increased to 100cc/hr. He was scheduled for a colonoscopy on 3/13 however procedure was cancelled due to hypoxia and fever. He was transferred to the ICU for closer monitoring. He continued to have oliguria overnight. Bladder scan revealed 200cc of urine but patient refused osullivan catheter placement. Wife reports that patient always has a hard time urinating again after osullivan catheters are placed/removed so he refuses them. He remained hypoxic despite FiO2 70% and ABG revealed combined respiratory and metabolic acidosis with pH 7.17. He was started on BiPAP as well as a bicarb infusion at 50cc/hr. ABG with mild improvement. AM labs revealed K 6.2 and he was shifted and given kayexalate.Trialysis catheter was placed this morning and he subsequently developed hypotension and was started on levophed. He was  "intubated later this morning. Nephrology consulted for CACHORRO. All history obtained by primary team and chart review as patient was intubated/sedated on exam. Consent for dialysis obtained by patient's wife and placed in chart. Of note, both of patient's parents were on dialysis.     Interval History:   Mild hyperkalemia this morning @ 5.6, otherwise, electrolytes stable.  Remains anuric, net positive 1.3L/24h.  Worked with PT this morning, c/o being "tired".  On HD at time of exam.    Review of patient's allergies indicates:   Allergen Reactions    No known drug allergies      Current Facility-Administered Medications   Medication Frequency    0.9%  NaCl infusion (for blood administration) Q24H PRN    0.9%  NaCl infusion (for blood administration) Q24H PRN    albumin human 25% bottle 50 g Once    alprazolam ODT dissolvable tablet 0.5 mg TID PRN    chlorhexidine 0.12 % solution 15 mL BID    epoetin jose miguel injection 4,000 Units Every Mon, Wed, Fri    escitalopram oxalate tablet 20 mg Daily    famotidine tablet 20 mg QHS    heparin (porcine) injection 5,000 Units Q12H    insulin aspart U-100 pen 0-4 Units Q4H PRN    insulin regular (Humulin R) 100 Units in sodium chloride 0.9% 100 mL infusion Continuous    levothyroxine tablet 137 mcg Daily    metoclopramide HCl injection 5 mg Q6H PRN    midodrine tablet 10 mg PRN    midodrine tablet 2.5 mg TID    nitroGLYCERIN 2% TD oint ointment 0.5 inch Q12H    nortriptyline capsule 10 mg QHS    oxyCODONE 5 mg/5 mL solution 10 mg Q4H PRN    polyethylene glycol packet 17 g Daily    povidone-iodine 10 % ointment PRN    predniSONE tablet 7.5 mg Daily    sennosides 8.8 mg/5 ml syrup 5 mL QHS    tacrolimus (PROGRAF) 1 mg/mL oral syringe Daily    tacrolimus (PROGRAF) 1 mg/mL oral syringe Daily       Objective:     Vital Signs (Most Recent):  Temp: 98.4 °F (36.9 °C) (04/13/18 0800)  Pulse: (!) 120 (04/13/18 0800)  Resp: (!) 22 (04/13/18 0800)  BP: 100/63 (04/13/18 " 0800)  SpO2: (!) 94 % (04/13/18 0800)  O2 Device (Oxygen Therapy): Trach Collar (04/13/18 0759) Vital Signs (24h Range):  Temp:  [97.8 °F (36.6 °C)-98.4 °F (36.9 °C)] 98.4 °F (36.9 °C)  Pulse:  [104-120] 120  Resp:  [4-31] 22  SpO2:  [94 %-100 %] 94 %  BP: ()/(58-78) 100/63     Weight: 63.1 kg (139 lb 1.8 oz) (04/12/18 0332)  Body mass index is 21.79 kg/m².  Body surface area is 1.73 meters squared.    I/O last 3 completed shifts:  In: 3516.6 [I.V.:61.9; Blood:554.8; Other:1000; NG/GT:1900]  Out: 2890 [Other:2600; Stool:290]    Physical Exam   Constitutional: He appears well-developed and well-nourished. No distress.   HENT:   Head: Normocephalic and atraumatic.   Eyes: Conjunctivae and EOM are normal.   Cardiovascular: Regular rhythm.  Tachycardia present.    Pulmonary/Chest: He has no wheezes. He has no rales.   Abdominal: Soft. He exhibits no distension.   Musculoskeletal: He exhibits edema. He exhibits no tenderness or deformity.   Neurological: He is alert.   Skin: Skin is warm and dry. He is not diaphoretic.       Significant Labs:  CBC:   Recent Labs  Lab 04/13/18  0409   WBC 5.03   RBC 3.01*   HGB 9.3*   HCT 28.7*      MCV 95   MCH 30.9   MCHC 32.4     CMP:   Recent Labs  Lab 04/13/18  0409   *   CALCIUM 9.2   ALBUMIN 2.3*   PROT 6.1   *   K 5.6*   CO2 25      BUN 52*   CREATININE 3.0*   ALKPHOS 185*   ALT 10   AST 17   BILITOT 0.5          Assessment/Plan:     Acute renal failure superimposed on stage 4 chronic kidney disease    - baseline sCr 2.6-3.0 consistent with CKD stage IV  - anuric CACHORRO; likely ischemic ATN from prolonged pre-renal state (diarrhea) in setting of prograf renal vasoconstriction; cannot r/o septic ATN or Prograf toxicity  - SLED started 3/14. TDC placed 4/4/18.   - remains anuric  - Blood pressure have been stable today   -Tolerated HD treatment well on 04/12 with 1L net fluid removal.    Plan:  3 hour HD treatment today for metabolic clearance/volume  management.  Attempt net UF goal of 2L as tolerated  Midodrine 10 mg PO 30 minutes prior to HD to prevent IDH  Procrit with HD  Albumin 25 g x 1 PRN during HD treatment.  Recommend changing tube feeds to Nepro, as this is the likely cause of his hyperkalemia.          Juaquin Ibrahim, NETTIE  Nephrology  [facility]  Pager:  [phone]        I have reviewed and concur with the NP's history, physical, assessment, and plan. I have personally interviewed and examined the patient at bedside.

## 2018-04-13 NOTE — PLAN OF CARE
Problem: Patient Care Overview  Goal: Plan of Care Review  Outcome: Ongoing (interventions implemented as appropriate)  Shift free of acute events. Insulin gtt continues to infuse - PRN insulin given x2. POC reviewed with patient and spouse - all questions and concerns addressed.

## 2018-04-13 NOTE — PT/OT/SLP PROGRESS
Speech Language Pathology      Lv Crocker  MRN: [id number]    Patient not seen today secondary to Other (Comment).  Upon first attempt, Patient with CRRT.  Upon second attempt, Patient declining PMSV trials.  ST to f/u 4/16/18 to re-attempt speaking valve training. Thank you.    JOSELYN Delarosa., [facility]-SLP  Speech-Language Pathology  Pager: [phone]  4/13/2018

## 2018-04-13 NOTE — PT/OT/SLP PROGRESS
Physical Therapy Treatment    Patient Name:  Lv Crocker   MRN:  [id number]  Co-treat with OT  Recommendations:     Discharge Recommendations:  LTACH (long term acute care hospital)   Discharge Equipment Recommendations:  (TBD)   Barriers to discharge: Inaccessible home and Decreased caregiver support at current functional level     Assessment:     Lv Crocker is a 42 y.o. male admitted with a medical diagnosis of Acute respiratory failure with hypoxia.  He presents with the following impairments/functional limitations:  weakness, impaired endurance, impaired self care skills, impaired functional mobilty, impaired balance, decreased coordination, decreased upper extremity function, decreased lower extremity function, pain, decreased ROM, impaired skin, edema.      Rehab Prognosis:  good; patient would benefit from acute skilled PT services to address these deficits and reach maximum level of function.      Recent Surgery: Procedure(s) (LRB):  INSERTION-CATHETER-PERM-A-CATH (Left)  INSERTION-TUBE-GASTROSTOMY (N/A) 9 Days Post-Op    Plan:     During this hospitalization, patient to be seen 5 x/week to address the above listed problems via gait training, therapeutic activities, therapeutic exercises, neuromuscular re-education  · Plan of Care Expires:  05/06/18   Plan of Care Reviewed with: patient, spouse    Subjective     Communicated with RN prior to session and wife present in room.  Patient found in bed upon PT entry to room, agreeable to treatment.      Chief Complaint: none reported   Patient comments/goals: to return home   Pain/Comfort:  · Pain Rating 1:  (B toes when touched )  · Pain Addressed 1: Reposition    Patients cultural, spiritual, Confucianism conflicts given the current situation: none reported     Objective:     Patient found with: tracheostomy, oxygen, central line, peripheral IV, PEG Tube     General Precautions: Standard, fall   Orthopedic Precautions:N/A   Braces: N/A      Functional Mobility:  · Bed Mobility:     · Scooting: total assistance  · Supine to Sit: total assistance  · Sit to Supine: total assistance  · Transfers: not performed   · Gait: not performed       AM-PAC 6 CLICK MOBILITY  Turning over in bed (including adjusting bedclothes, sheets and blankets)?: 2  Sitting down on and standing up from a chair with arms (e.g., wheelchair, bedside commode, etc.): 1  Moving from lying on back to sitting on the side of the bed?: 2  Moving to and from a bed to a chair (including a wheelchair)?: 1  Need to walk in hospital room?: 1  Climbing 3-5 steps with a railing?: 1  Total Score: 8       Therapeutic Activities and Exercises:  Educated pt on PT POC  Educated pt on importance of OOB activity  Pt verbalized understanding    Sitting EOB x 12 minutes with total A to increase tolerance to OOB activity and to create optimal positioning for lung expansion     B LE AAROM sitting EOB  Ankle pumps x 10 reps  LAQ's x 10 reps  Marching x 10 reps     Patient left HOB elevated with all lines intact, call button in reach, RN notified    GOALS:    Physical Therapy Goals        Problem: Physical Therapy Goal    Goal Priority Disciplines Outcome Goal Variances Interventions   Physical Therapy Goal     PT/OT, PT Ongoing (interventions implemented as appropriate)     Description:  Goals to be met by: 18     Patient will increase functional independence with mobility by performin. Supine to sit with Moderate Assistance - not met  2. Sit to supine with Moderate Assistance - not met  3. Rolling to Left and Right with Minimal Assistance. - not met  4. Sit to stand transfer with Moderate Assistance - not met  5. Bed to chair transfer with Maximum Assistance - not met  6. Gait  x 20 feet with Minimal Assistance. - not met                       Time Tracking:     PT Received On: 18  PT Start Time: 855     PT Stop Time: 917  PT Total Time (min): 22 min     Billable Minutes: Therapeutic  Activity 12    Treatment Type: Treatment  PT/PTA: PT     PTA Visit Number: 0       Teresa Dudley PT, DPT  4/13/2018  [phone]

## 2018-04-13 NOTE — PROGRESS NOTES
Pt dialyzed for 180 minutes for clearance, electrolyte balance and for fluid removal. Tolerated 1500 ml fluid removal. Post HD lines flushed with heparin  And capped securely.

## 2018-04-13 NOTE — PROGRESS NOTES
UPDATE    [facility] liaison Liv met with pt and wife in pt's room and reported to SW pt is a good candidate for LTAC. Per HTS rounds, pt will be ready to transfer in 1-2 weeks. [facility] liaison confirms understanding and is in agreement with plan.     GUS spoke with pt's wife and confirmed her understanding of plan. No needs voiced or indicated at this time. SW providing psychosocial and counseling support, education, resources, and d/c planning as needed. SW continuing to follow and remains available.

## 2018-04-13 NOTE — SUBJECTIVE & OBJECTIVE
Interval History: patient without issues overnight, still NPO per speech     Continuous Infusions:   insulin (HUMAN R) infusion (adults) 1 Units/hr (04/13/18 1400)     Scheduled Meds:   chlorhexidine  15 mL Mouth/Throat BID    epoetin jose miguel (PROCRIT) injection  4,000 Units Intravenous Every Mon, Wed, Fri    escitalopram oxalate  20 mg Per G Tube Daily    famotidine  20 mg Per G Tube QHS    heparin (porcine)  5,000 Units Subcutaneous Q12H    levothyroxine  137 mcg Per G Tube Daily    midodrine  2.5 mg Oral TID    nitroGLYCERIN 2% TD oint  0.5 inch Topical (Top) Q12H    nortriptyline  10 mg Per OG tube QHS    oxyCODONE  10 mg Per G Tube Q12H    polyethylene glycol  17 g Oral Daily    predniSONE  7.5 mg Per G Tube Daily    sennosides 8.8 mg/5 ml  5 mL Oral QHS    tacrolimus  3 mg Per G Tube Daily    tacrolimus  4 mg Per G Tube Daily     PRN Meds:sodium chloride, sodium chloride, alprazolam ODT, heparin (porcine), insulin aspart U-100, metoclopramide HCl, midodrine, povidone-iodine    Review of patient's allergies indicates:   Allergen Reactions    No known drug allergies      Objective:     Vital Signs (Most Recent):  Temp: 98.1 °F (36.7 °C) (04/13/18 1100)  Pulse: (!) 118 (04/13/18 1430)  Resp: (!) 8 (04/13/18 1430)  BP: (!) 93/50 (04/13/18 1430)  SpO2: 95 % (04/13/18 1430) Vital Signs (24h Range):  Temp:  [97.8 °F (36.6 °C)-98.4 °F (36.9 °C)] 98.1 °F (36.7 °C)  Pulse:  [104-120] 118  Resp:  [4-24] 8  SpO2:  [94 %-100 %] 95 %  BP: ()/(49-78) 93/50     Patient Vitals for the past 72 hrs (Last 3 readings):   Weight   04/12/18 0332 63.1 kg (139 lb 1.8 oz)   04/11/18 0400 63.2 kg (139 lb 5.3 oz)     Body mass index is 21.79 kg/m².      Intake/Output Summary (Last 24 hours) at 04/13/18 1451  Last data filed at 04/13/18 1400   Gross per 24 hour   Intake          2040.46 ml   Output             2050 ml   Net            -9.54 ml       Hemodynamic Parameters:         Physical Exam   Constitutional: He  appears well-developed and well-nourished. No distress.   HENT:   Head: Normocephalic and atraumatic.   Eyes: Conjunctivae and EOM are normal.   Cardiovascular: Regular rhythm.  Tachycardia present.    Pulmonary/Chest: He has no wheezes. He has no rales.   Abdominal: Soft. He exhibits no distension.   Musculoskeletal: He exhibits edema. He exhibits no tenderness or deformity.   Neurological: He is alert.   Skin: Skin is warm and dry. He is not diaphoretic.       Significant Labs:  CBC:    Recent Labs  Lab 04/11/18 0439 04/12/18 0412 04/13/18  0409   WBC 3.19* 4.24 5.03   RBC 1.96* 3.01* 3.01*   HGB 6.3* 9.3* 9.3*   HCT 20.0* 28.2* 28.7*   * 147* 168   * 94 95   MCH 32.1* 30.9 30.9   MCHC 31.5* 33.0 32.4     BNP:  No results for input(s): BNP in the last 168 hours.    Invalid input(s): BNPTRIAGELBLO  CMP:    Recent Labs  Lab 04/11/18 0439 04/12/18 0412 04/13/18  0409   * 390* 122*   CALCIUM 8.5* 8.9 9.2   ALBUMIN 2.0* 2.2* 2.3*   PROT 5.2* 5.9* 6.1   * 134* 135*   K 4.9 5.0 5.6*   CO2 29 22* 25   CL 98 101 102   BUN 45* 29* 52*   CREATININE 2.8* 2.0* 3.0*   ALKPHOS 165* 188* 185*   ALT 8* 12 10   AST 16 25 17   BILITOT 0.5 0.7 0.5      Coagulation:   No results for input(s): PT, INR, APTT in the last 168 hours.  LDH:  No results for input(s): LDH in the last 72 hours.  Microbiology:  Microbiology Results (last 7 days)     Procedure Component Value Units Date/Time    Fungus culture [[id number]] Collected:  03/28/18 1607    Order Status:  Completed Specimen:  Body Fluid from Lung, RLL Updated:  04/12/18 0936     Fungus (Mycology) Culture Culture in progress     Fungus (Mycology) Culture No fungus isolated after 2 weeks          I have reviewed all pertinent labs within the past 24 hours.    Estimated Creatinine Clearance: 28 mL/min (A) (based on SCr of 3 mg/dL (H)).    Diagnostic Results:  I have reviewed and interpreted all pertinent imaging results/findings within the past 24 hours.

## 2018-04-13 NOTE — SUBJECTIVE & OBJECTIVE
"Interval HPI:   Overnight events:  AF, tachycardic, low normal BP  On PDN 7.5mg daily     Insulin gtt at 2.2u/h.  IIP was not ordered yesterday.  /130.  Rate continued at 2.2u/h.  Rechecked and hypoglycemic in th 50s.  Given 1/2 amp dextrose.  Repeat 140s.  Rate adjusted to 1.0u/h.    TF at goal 50cc/h. Tolerating well. No nausea    /63   Pulse (!) 120   Temp 98.4 °F (36.9 °C) (Axillary)   Resp (!) 22   Ht 5' 7" (1.702 m)   Wt 63.1 kg (139 lb 1.8 oz)   SpO2 (!) 94%   BMI 21.79 kg/m²     Labs Reviewed and Include      Recent Labs  Lab 04/13/18  0409   *   CALCIUM 9.2   ALBUMIN 2.3*   PROT 6.1   *   K 5.6*   CO2 25      BUN 52*   CREATININE 3.0*   ALKPHOS 185*   ALT 10   AST 17   BILITOT 0.5     Lab Results   Component Value Date    WBC 5.03 04/13/2018    HGB 9.3 (L) 04/13/2018    HCT 28.7 (L) 04/13/2018    MCV 95 04/13/2018     04/13/2018     No results for input(s): TSH, FREET4 in the last 168 hours.  Lab Results   Component Value Date    HGBA1C 5.1 04/05/2018       Nutritional status:   Body mass index is 21.79 kg/m².  Lab Results   Component Value Date    ALBUMIN 2.3 (L) 04/13/2018    ALBUMIN 2.2 (L) 04/12/2018    ALBUMIN 2.0 (L) 04/11/2018     Lab Results   Component Value Date    PREALBUMIN 13 (L) 04/08/2018    PREALBUMIN 5 (L) 03/15/2018    PREALBUMIN 18 (L) 03/24/2015       Estimated Creatinine Clearance: 28 mL/min (A) (based on SCr of 3 mg/dL (H)).    Accu-Checks  Recent Labs      04/12/18   0421  04/12/18   0902  04/12/18   1147  04/12/18   1152  04/12/18   1616  04/12/18   2006  04/12/18   2356  04/13/18   0415  04/13/18   0814  04/13/18   0817   POCTGLUCOSE  335*  374*  445*  401*  396*  371*  318*  133*  52*  57*       Current Medications and/or Treatments Impacting Glycemic Control  Immunotherapy:  Immunosuppressants         Stop Route Frequency     tacrolimus (PROGRAF) 1 mg/mL oral syringe      -- PER G TUBE Daily     tacrolimus (PROGRAF) 1 mg/mL oral syringe "      -- PER G TUBE Daily        Steroids:   Hormones     Start     Stop Route Frequency Ordered    04/06/18 0900  predniSONE tablet 7.5 mg      -- PER G TUBE Daily 04/05/18 0926        Pressors:    Autonomic Drugs     Start     Stop Route Frequency Ordered    04/13/18 0859  midodrine tablet 10 mg      -- Oral As needed (PRN) 04/13/18 0800    04/09/18 1500  midodrine tablet 2.5 mg      -- Oral 3 times daily 04/09/18 1006        Hyperglycemia/Diabetes Medications: Antihyperglycemics     Start     Stop Route Frequency Ordered    04/11/18 0015  insulin regular (Humulin R) 100 Units in sodium chloride 0.9% 100 mL infusion     Question:  Insulin Rate Adjustment (DO NOT MODIFY ANSWER)  Answer:  [email or web address]    -- IV Continuous 04/10/18 2313    04/10/18 2221  insulin aspart U-100 pen 0-4 Units      -- SubQ Every 4 hours PRN 04/10/18 2122

## 2018-04-13 NOTE — ASSESSMENT & PLAN NOTE
- baseline sCr 2.6-3.0 consistent with CKD stage IV  - anuric CACHORRO; likely ischemic ATN from prolonged pre-renal state (diarrhea) in setting of prograf renal vasoconstriction; cannot r/o septic ATN or Prograf toxicity  - SLED started 3/14. TDC placed 4/4/18.   - remains anuric  - Blood pressure have been stable today   -Tolerated HD treatment well on 04/12 with 1L net fluid removal.    Plan:  3 hour HD treatment today for metabolic clearance/volume management.  Attempt net UF goal of 2L as tolerated  Midodrine 10 mg PO 30 minutes prior to HD to prevent IDH  Procrit with HD  Albumin 25 g x 1 PRN during HD treatment.  Recommend changing tube feeds to Nepro, as this is the likely cause of his hyperkalemia.

## 2018-04-13 NOTE — PT/OT/SLP PROGRESS
Occupational Therapy   Treatment    Name: Lv Crocker  MRN: [id number]  Admitting Diagnosis:  Acute respiratory failure with hypoxia  9 Days Post-Op    Recommendations:     Discharge Recommendations: LTACH (long term acute care hospital)  Discharge Equipment Recommendations:   (TBD closer to d/c)  Barriers to discharge:  Decreased caregiver support, Inaccessible home environment    Subjective     Communicated with: RN prior to session.  Pain/Comfort:  · Pain Rating 1: 0/10  · Pain Rating Post-Intervention 1: 0/10    Patients cultural, spiritual, Buddhist conflicts given the current situation: none reported    Objective:     Patient found with: arterial line, pulse ox (continuous), telemetry, tracheostomy, oxygen, blood pressure cuff, central line, PEG Tube    General Precautions: Standard, fall, aspiration, respiratory   Orthopedic Precautions:N/A   Braces:       Occupational Performance:    Bed Mobility:    · Patient completed Rolling/Turning to Left with  total assistance  · Patient completed Scooting/Bridging with total assistance  · Patient completed Supine to Sit with total assistance  · Patient completed Sit to Supine with total assistance     Functional Mobility/Transfers:  NT    Activities of Daily Living:  · Grooming: total assistance  seated EOB    Patient left supine with all lines intact, call button in reach, RN notified and spouse present    [facility] 6 Click:  [facility] Total Score: 6    Treatment & Education:  Pt ed on OT POC  Pt sat EOB x 12 min (time limited 2* dialysis RN at b/s to initiate tx)  Pt required total A for balance seated EOB with moderate A and cueing to keep head in midline  Pt performed B UE AROM to hand/wrist/FA and AAROM for elbow flexion/extension in gravity eliminated planes; PROM to B shoulders  Pt repositioned in bed for optimal skin/joint integrity and edema management  Education:    Assessment:     Lv Crocker is a 42 y.o. male with a medical diagnosis of Acute  respiratory failure with hypoxia.  Performance deficits affecting function are weakness, impaired self care skills, impaired functional mobilty, impaired endurance, gait instability, decreased upper extremity function, decreased lower extremity function, decreased safety awareness, impaired balance, impaired skin, edema, impaired cardiopulmonary response to activity, impaired fine motor, impaired coordination.      Rehab Prognosis:  Fair; patient would benefit from acute skilled OT services to address these deficits and reach maximum level of function.       Plan:     Patient to be seen 5 x/week to address the above listed problems via self-care/home management, therapeutic activities, therapeutic exercises  · Plan of Care Expires: 05/06/18  · Plan of Care Reviewed with: patient, spouse    This Plan of care has been discussed with the patient who was involved in its development and understands and is in agreement with the identified goals and treatment plan    GOALS:    Occupational Therapy Goals        Problem: Occupational Therapy Goal    Goal Priority Disciplines Outcome Interventions   Occupational Therapy Goal     OT, PT/OT Ongoing (interventions implemented as appropriate)    Description:  Goals to be met by: 4/23/2018    Pt will follow 75% of motor commands with <2 verbal cues for repetition of task to maximize engagement in grooming task.--MET  Pt will complete feeding with mod A.   Pt will complete supine with HOB slightly elevated to seated at EOB with mod A in prep for seated grooming task.  Pt will engage in BUE exercises in orders to increase strength to 3+/5 in BUE's to increase engagement in seated grooming task  Pt will sit at EOB for approx 10 minutes with mod A and unilateral UE support to complete grooming task  Pt will complete sit>stand from EOB with bed in lowest position with mod A in prep [place] [facility]                      Time Tracking:     OT Date of Treatment: 04/13/18  OT Start  Time: 0856  OT Stop Time: 0914  OT Total Time (min): 18 min    Billable Minutes:Therapeutic Exercise 16     AMADOU Murillo  4/13/2018

## 2018-04-13 NOTE — PROGRESS NOTES
Acute HD treatment initiated bedside in [facility]-ICU RX as ordered. See flow sheet for details. Lines secured and alarms enabelled. Good flows [facility] temporary cath.

## 2018-04-13 NOTE — NURSING
Rounds Report: Attended interdisciplinary rounds this morning with the transplant team including SW, physicians, fellows,  mid-level providers, and transplant coordinators.  Discussed plan of care, including DC date- unknown at this time, and current plan to proceed with , pt tolerated HD , plan to transfer to TSU, then LTAC/rehab.

## 2018-04-13 NOTE — PLAN OF CARE
Problem: Occupational Therapy Goal  Goal: Occupational Therapy Goal  Goals to be met by: 4/23/2018    Pt will follow 75% of motor commands with <2 verbal cues for repetition of task to maximize engagement in grooming task.--MET  Pt will complete feeding with mod A.   Pt will complete supine with HOB slightly elevated to seated at EOB with mod A in prep for seated grooming task.  Pt will engage in BUE exercises in orders to increase strength to 3+/5 in BUE's to increase engagement in seated grooming task  Pt will sit at EOB for approx 10 minutes with mod A and unilateral UE support to complete grooming task  Pt will complete sit>stand from EOB with bed in lowest position with mod A in prep for transfer to [facility]     Outcome: Ongoing (interventions implemented as appropriate)  The above goals remain appropriate. AMADOU Murillo  4/13/2018

## 2018-04-13 NOTE — PROGRESS NOTES
"[facility]  Endocrinology  Progress Note    Admit Date: 3/11/2018     Reason for Consult: abnormal TFTs    Surgical Procedure and Date: s/p heart transplant 2014     Diabetes diagnosis year: 5yo (T1DM)     Home Diabetes Medications:    Levemir 15u qHS  Novolog 4u AC     How often checking glucose at home? 4 times daily   BG readings on regimen: 150-200s  Hypoglycemia on the regimen?  Yes, rarely - treats appropriately (light snack, glucose tab)  Missed doses on regimen?  No        Diabetes Complications include:     Hyperglycemia, Hypoglycemia  and Diabetic chronic kidney disease          Complicating diabetes co morbidities:   N/A     HPI:   Patient is a 42 y.o. male with a diagnosis of T1DM, HTN, hypothyroidism, s/p heart transplant.  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 He required serial paracentesis and thoracentesis until he underwent a VATS with pleurX catheter placement on 1/11/2018 and removed 2/7/18.       Presented to hospital secondary to diarrhea and cough.  Upon initial labs, TSH was decreased (0.101) and free T4 1.33.  Endocrinology consulted to assist in management of these labs.  He was last seen in clinic by Kamala Vázquez NP 11/2017.   Previous hospitalization, endocrine consulted for same problem.  During that visit, recommended decreasing LT4 to 125mcg daily. Unfortunately, patient was discharged on previous dose of 150mcg daily.     Interval HPI:   Overnight events:  AF, tachycardic, low normal BP  On PDN 7.5mg daily     Insulin gtt at 2.2u/h.  IIP was not ordered yesterday.  /130.  Rate continued at 2.2u/h.  Rechecked and hypoglycemic in th 50s.  Given 1/2 amp dextrose.  Repeat 140s.  Rate adjusted to 1.0u/h.    TF at goal 50cc/h. Tolerating well. No nausea    /63   Pulse (!) 120   Temp 98.4 °F (36.9 °C) (Axillary)   Resp (!) 22   Ht 5' 7" (1.702 " m)   Wt 63.1 kg (139 lb 1.8 oz)   SpO2 (!) 94%   BMI 21.79 kg/m²       Labs Reviewed and Include      Recent Labs  Lab 04/13/18  0409   *   CALCIUM 9.2   ALBUMIN 2.3*   PROT 6.1   *   K 5.6*   CO2 25      BUN 52*   CREATININE 3.0*   ALKPHOS 185*   ALT 10   AST 17   BILITOT 0.5     Lab Results   Component Value Date    WBC 5.03 04/13/2018    HGB 9.3 (L) 04/13/2018    HCT 28.7 (L) 04/13/2018    MCV 95 04/13/2018     04/13/2018     No results for input(s): TSH, FREET4 in the last 168 hours.  Lab Results   Component Value Date    HGBA1C 5.1 04/05/2018       Nutritional status:   Body mass index is 21.79 kg/m².  Lab Results   Component Value Date    ALBUMIN 2.3 (L) 04/13/2018    ALBUMIN 2.2 (L) 04/12/2018    ALBUMIN 2.0 (L) 04/11/2018     Lab Results   Component Value Date    PREALBUMIN 13 (L) 04/08/2018    PREALBUMIN 5 (L) 03/15/2018    PREALBUMIN 18 (L) 03/24/2015       Estimated Creatinine Clearance: 28 mL/min (A) (based on SCr of 3 mg/dL (H)).    Accu-Checks  Recent Labs      04/12/18   0421  04/12/18   0902  04/12/18   1147  04/12/18   1152  04/12/18   1616  04/12/18   2006  04/12/18   2356  04/13/18   0415  04/13/18   0814  04/13/18   0817   POCTGLUCOSE  335*  374*  445*  401*  396*  371*  318*  133*  52*  57*       Current Medications and/or Treatments Impacting Glycemic Control  Immunotherapy:  Immunosuppressants         Stop Route Frequency     tacrolimus (PROGRAF) 1 mg/mL oral syringe      -- PER G TUBE Daily     tacrolimus (PROGRAF) 1 mg/mL oral syringe      -- PER G TUBE Daily        Steroids:   Hormones     Start     Stop Route Frequency Ordered    04/06/18 0900  predniSONE tablet 7.5 mg      -- PER G TUBE Daily 04/05/18 0926        Pressors:    Autonomic Drugs     Start     Stop Route Frequency Ordered    04/13/18 0859  midodrine tablet 10 mg      -- Oral As needed (PRN) 04/13/18 0800    04/09/18 1500  midodrine tablet 2.5 mg      -- Oral 3 times daily 04/09/18 1006         Hyperglycemia/Diabetes Medications: Antihyperglycemics     Start     Stop Route Frequency Ordered    04/11/18 0015  insulin regular (Humulin R) 100 Units in sodium chloride 0.9% 100 mL infusion     Question:  Insulin Rate Adjustment (DO NOT MODIFY ANSWER)  Answer:  [email or web address]\Images\Pharmacy\InsulinInfusions\InsulinRegAdj [id number].pdf    -- IV Continuous 04/10/18 2313    04/10/18 2221  insulin aspart U-100 pen 0-4 Units      -- SubQ Every 4 hours PRN 04/10/18 2122          ASSESSMENT and PLAN    * Acute respiratory failure with hypoxia    Per primary  S/p trach.  Practicing with speech valve.  Remains NPO        Type 1 diabetes mellitus with stage 3 chronic kidney disease    BG goal 140-1180     Previously globally elevated.  Unclear of etiology of increasing glucose (possibly better absorption of TF, immunosupressants causing some insulin resistance).    Yesterday, did not place orders for IIP, which would have allowed for q1h accuchecks.  Transition insulin gtt remained at 2.2 u/hr, low correction q4hr while on TF  Accucheck 122/130s - without adjustment to appropriate parameters.  Received 1/2 amp. Subsequent normal BG.      Adjusted transition drip to 1.0u/h.  Will continue to monitor patient closely.    Pt is T1DM, do not suspend insulin >1 hr   If TF held, decrease insulin rate to 0.2u/hr - known to have controlled BG on basal needs of lev 5 daily/ 0.2u/hr during this hospitalization        Discharge Recommendations:  TBD.        On enteral nutrition    TF at goal.  May increase insulin needs        Current chronic use of systemic steroids    On PDN 7.5mg daily  Can increase insulin requirement        CKD (chronic kidney disease), stage IV    Titrate cautiously.  Caution with insulin stacking.  Avoid hypoglycemia.        Hypothyroidism due to Hashimoto's thyroiditis    Continue Lt4 137mcg per G tube    Ideally should hold TF 1 hr before and 1 hr after given LT4      Recheck TFTs in 4 weeks (around  5/6)          Heart transplanted    Per transplant  Avoid hypoglycemia  On PDN and prograf - could lead to prandial elevations and insulin resistance.            Deonna Larry MD  Endocrinology  [facility]

## 2018-04-14 NOTE — PROGRESS NOTES
"[facility]  Endocrinology  Progress Note    Admit Date: 3/11/2018     Reason for Consult: abnormal TFTs    Surgical Procedure and Date: s/p heart transplant 2014     Diabetes diagnosis year: 5yo (T1DM)     Home Diabetes Medications:    Levemir 15u qHS  Novolog 4u AC     How often checking glucose at home? 4 times daily   BG readings on regimen: 150-200s  Hypoglycemia on the regimen?  Yes, rarely - treats appropriately (light snack, glucose tab)  Missed doses on regimen?  No        Diabetes Complications include:     Hyperglycemia, Hypoglycemia  and Diabetic chronic kidney disease          Complicating diabetes co morbidities:   N/A     HPI:   Patient is a 42 y.o. male with a diagnosis of T1DM, HTN, hypothyroidism, s/p heart transplant.  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 He required serial paracentesis and thoracentesis until he underwent a VATS with pleurX catheter placement on 1/11/2018 and removed 2/7/18.       Presented to hospital secondary to diarrhea and cough.  Upon initial labs, TSH was decreased (0.101) and free T4 1.33.  Endocrinology consulted to assist in management of these labs.  He was last seen in clinic by Kamala Vázquez NP 11/2017.   Previous hospitalization, endocrine consulted for same problem.  During that visit, recommended decreasing LT4 to 125mcg daily. Unfortunately, patient was discharged on previous dose of 150mcg daily.     Interval HPI:   Overnight events: bg above goal  Eating:   NPO  Nausea: No  Hypoglycemia and intervention: No  Fever: No  TPN and/or TF: Yes  If yes, type of TF/TPN and rate: 10cc/hr    /67 (BP Location: Right arm, Patient Position: Lying)   Pulse (!) 120   Temp 98.4 °F (36.9 °C)   Resp 20   Ht 5' 7" (1.702 m)   Wt 63.5 kg (139 lb 15.9 oz)   SpO2 98%   BMI 21.93 kg/m²       Labs Reviewed and Include      Recent Labs  Lab " 04/14/18  0504   *   CALCIUM 9.4   ALBUMIN 2.6*   PROT 6.5   *   K 5.9*   CO2 20*      BUN 38*   CREATININE 2.7*   ALKPHOS 201*   ALT 15   AST 27   BILITOT 0.8     Lab Results   Component Value Date    WBC 7.03 04/14/2018    HGB 9.1 (L) 04/14/2018    HCT 29.4 (L) 04/14/2018     (H) 04/14/2018     04/14/2018     No results for input(s): TSH, FREET4 in the last 168 hours.  Lab Results   Component Value Date    HGBA1C 5.1 04/05/2018       Nutritional status:   Body mass index is 21.93 kg/m².  Lab Results   Component Value Date    ALBUMIN 2.6 (L) 04/14/2018    ALBUMIN 2.3 (L) 04/13/2018    ALBUMIN 2.2 (L) 04/12/2018     Lab Results   Component Value Date    PREALBUMIN 13 (L) 04/08/2018    PREALBUMIN 5 (L) 03/15/2018    PREALBUMIN 18 (L) 03/24/2015       Estimated Creatinine Clearance: 31.4 mL/min (A) (based on SCr of 2.7 mg/dL (H)).    Accu-Checks  Recent Labs      04/13/18   0814  04/13/18   0817  04/13/18   0847  04/13/18   1039  04/13/18   1232  04/13/18   1535  04/13/18   2115  04/14/18   0100  04/14/18   0452  04/14/18   0832   POCTGLUCOSE  52*  57*  142*  162*  203*  238*  340*  274*  293*  245*       Current Medications and/or Treatments Impacting Glycemic Control  Immunotherapy:  Immunosuppressants         Stop Route Frequency     tacrolimus (PROGRAF) 1 mg/mL oral syringe      -- PER G TUBE Daily     tacrolimus (PROGRAF) 1 mg/mL oral syringe      -- PER G TUBE Daily        Steroids:   Hormones     Start     Stop Route Frequency Ordered    04/06/18 0900  predniSONE tablet 7.5 mg      -- PER G TUBE Daily 04/05/18 0926        Pressors:    Autonomic Drugs     Start     Stop Route Frequency Ordered    04/13/18 0859  midodrine tablet 10 mg      -- Oral As needed (PRN) 04/13/18 0800    04/09/18 1500  midodrine tablet 2.5 mg      -- Oral 3 times daily 04/09/18 1006        Hyperglycemia/Diabetes Medications: Antihyperglycemics     Start     Stop Route Frequency Ordered    04/11/18 0012   insulin regular (Humulin R) 100 Units in sodium chloride 0.9% 100 mL infusion     Question:  Insulin Rate Adjustment (DO NOT MODIFY ANSWER)  Answer:  [email or web address]\Images\Pharmacy\InsulinInfusions\InsulinRegAdj [id number].pdf    -- IV Continuous 04/10/18 2313    04/10/18 2221  insulin aspart U-100 pen 0-4 Units      -- SubQ Every 4 hours PRN 04/10/18 2122          ASSESSMENT and PLAN    * Acute respiratory failure with hypoxia    Per primary  S/p trach.  Practicing with speech valve.  Remains NPO        Type 1 diabetes mellitus with stage 3 chronic kidney disease    BG goal 140-180     Increase transition gtt to 1.5 u/hr  bg monitoring q4hrs    Pt is T1DM, do not suspend insulin >1 hr   If TF held, decrease insulin rate to 0.2u/hr - known to have controlled BG on basal needs of lev 5 daily/ 0.2u/hr during this hospitalization        Discharge Recommendations:  TBD.        Current chronic use of systemic steroids    On PDN 7.5mg daily  Can increase insulin requirement        Hypothyroidism due to Hashimoto's thyroiditis    Continue Lt4 137mcg per G tube    Ideally should hold TF 1 hr before and 1 hr after given LT4      Recheck TSH          Heart transplanted    Per transplant  Avoid hypoglycemia  On PDN and prograf - could lead to prandial elevations and insulin resistance.            Ankur Ketih MD  Endocrinology  [facility]

## 2018-04-14 NOTE — PROGRESS NOTES
Pt complaining of pain to Bilateral feet. Informed Dr. Paris, Tylenol ordered. Will continue monitor.

## 2018-04-14 NOTE — ASSESSMENT & PLAN NOTE
BG goal 140-180     Increase transition gtt to 1.5 u/hr  bg monitoring q4hrs    Pt is T1DM, do not suspend insulin >1 hr   If TF held, decrease insulin rate to 0.2u/hr - known to have controlled BG on basal needs of lev 5 daily/ 0.2u/hr during this hospitalization        Discharge Recommendations:  TBD.

## 2018-04-14 NOTE — CARE UPDATE
RN Proactive Rounding Note  Time of Visit: 10:10    Admit Date: 3/11/2018  LOS: 33  Code Status: Full Code   Date of Visit: 2018  : 1975  Age: 42 y.o.  Sex: male  Bed: 8098/8098 A:   MRN: [id number]  Was the patient discharged from an ICU this admission? yes   Was the patient discharged from a PACU within last 24 hours? no  Did the patient receive conscious sedation/general anesthesia in last 24 hours? no  Was the patient in the ED within the past 24 hours? no  Was the patient started on NIPPV within the past 24 hours? no  Attending Physician: Kanika Ferreira MD  Primary Service: Networked reference to record PCT       ASSESSMENT:     Abnormal Vital Signs: HR 120s  Clinical Issues: Circulatory     INTERVENTIONS/ RECOMMENDATIONS:     Patient s/p heart transplant, RSV infection with trach/PEG placement, seen on proactive rounds for tachycardia. Per chart review, baseline tachycardia HR 110s-120s. On bedside exam, patient resting comfortably, no distress noted. Wife at bedside. Hemodynamically stable at this time.     Discussed plan of care with XI Melvin    PHYSICIAN ESCALATION:     Yes/No no    Orders received and case discussed with   n/a     Disposition: TSU    FOLLOW-UP/CONTINGENCY:   No follow up at this time     Call back the Rapid Response Nurse at x [phone]  for additional questions or concerns

## 2018-04-14 NOTE — ASSESSMENT & PLAN NOTE
Continue Lt4 137mcg per G tube    Ideally should hold TF 1 hr before and 1 hr after given LT4      Recheck TSH

## 2018-04-14 NOTE — PLAN OF CARE
Problem: Patient Care Overview  Goal: Plan of Care Review  Outcome: Ongoing (interventions implemented as appropriate)  No acute events. HD 4/13 with 2 L removed. Tachycardic on telemetry (110s), VSS otherwise. Trach collar, #6 shiley, 5 L, 28%. SpO2 obtained from right ear d/t poor reading from fingers.  with ss coverage provided. Insulin gtt remains at 1 unit/hr. NPO with meds per PEG and tolerating TF at 50 ml/hr, 5 ml residual. HOB maintained > 30 degrees at all times. Trach suction x 2 this shift with thin, yellowish secretions noted. Nitro paste applied to affected toes on right foot. DTI to left heel HAO as pt refuses dressing change at this time. C/o pain to bilateral feet resolved after acetaminophen and scheduled oxycodone. Nausea x 1 resolved after PRN phenergan. Fall precautions maintained. Bed wheels locked, bed in lowest position, upper SR up x 2, call light in reach, non-skid socks when OOB. Instructed pt to call for assistance as needed. POC discussed with pt and spouse. Will cont to monitor.

## 2018-04-14 NOTE — PROGRESS NOTES
Suctioned patient, thick copious sputum, yellow.  Tolerated well.  No acute distress noted at this time.

## 2018-04-14 NOTE — ASSESSMENT & PLAN NOTE
- No changes from before.   - Has known history of recurrent ascites and pleural effusions   - S/P thoracentesis X 2, followed by VATS/pleurex cath placement (January 2018) with removal of pleurex (February 2018) and 3rd thoracentesis 2/14/18 with no significant findings on fluid removal.

## 2018-04-14 NOTE — SUBJECTIVE & OBJECTIVE
Interval History: Nausea overnight. Holding HD today. All questions answered.     Continuous Infusions:   insulin (HUMAN R) infusion (adults) 1.5 Units/hr (04/14/18 1221)     Scheduled Meds:   chlorhexidine  15 mL Mouth/Throat BID    epoetin jose miguel (PROCRIT) injection  4,000 Units Intravenous Every Mon, Wed, Fri    escitalopram oxalate  20 mg Per G Tube Daily    famotidine  20 mg Per G Tube QHS    heparin (porcine)  5,000 Units Subcutaneous Q12H    levothyroxine  137 mcg Per G Tube Daily    midodrine  2.5 mg Oral TID    nitroGLYCERIN 2% TD oint  0.5 inch Topical (Top) Q12H    nortriptyline  10 mg Per OG tube QHS    oxyCODONE  10 mg Per G Tube Q12H    polyethylene glycol  17 g Oral Daily    predniSONE  7.5 mg Per G Tube Daily    sennosides 8.8 mg/5 ml  5 mL Oral QHS    tacrolimus  3 mg Per G Tube Daily    tacrolimus  4 mg Per G Tube Daily     PRN Meds:sodium chloride, sodium chloride, alprazolam ODT, heparin (porcine), insulin aspart U-100, midodrine, ondansetron, povidone-iodine, promethazine (PHENERGAN) IVPB    Review of patient's allergies indicates:   Allergen Reactions    No known drug allergies      Objective:     Vital Signs (Most Recent):  Temp: 97.2 °F (36.2 °C) (04/14/18 1138)  Pulse: 101 (04/14/18 1227)  Resp: 20 (04/14/18 1227)  BP: 109/69 (04/14/18 1138)  SpO2: (!) 94 % (04/14/18 1227) Vital Signs (24h Range):  Temp:  [97.2 °F (36.2 °C)-98.5 °F (36.9 °C)] 97.2 °F (36.2 °C)  Pulse:  [101-122] 101  Resp:  [15-24] 20  SpO2:  [92 %-100 %] 94 %  BP: ()/(48-69) 109/69     Patient Vitals for the past 72 hrs (Last 3 readings):   Weight   04/14/18 0445 63.5 kg (139 lb 15.9 oz)   04/12/18 0332 63.1 kg (139 lb 1.8 oz)     Body mass index is 21.93 kg/m².      Intake/Output Summary (Last 24 hours) at 04/14/18 1543  Last data filed at 04/14/18 0445   Gross per 24 hour   Intake           616.25 ml   Output                0 ml   Net           616.25 ml     Physical Exam   Constitutional: He appears  well-developed and well-nourished. No distress.   HENT:   Head: Normocephalic and atraumatic.   Eyes: Conjunctivae and EOM are normal.   Cardiovascular: Regular rhythm.  Tachycardia present.    Pulmonary/Chest: He has no wheezes. He has no rales.   Abdominal: Soft. He exhibits no distension.   Musculoskeletal: He exhibits edema. He exhibits no tenderness or deformity.   Neurological: He is alert.   Skin: Skin is warm and dry. He is not diaphoretic.     Significant Labs:  CBC:    Recent Labs  Lab 04/12/18 0412 04/13/18 0409 04/14/18  0504   WBC 4.24 5.03 7.03   RBC 3.01* 3.01* 2.91*   HGB 9.3* 9.3* 9.1*   HCT 28.2* 28.7* 29.4*   * 168 166   MCV 94 95 101*   MCH 30.9 30.9 31.3*   MCHC 33.0 32.4 31.0*     BNP:  No results for input(s): BNP in the last 168 hours.    Invalid input(s): BNPTRIAGELBLO  CMP:    Recent Labs  Lab 04/12/18 0412 04/13/18 0409 04/14/18  0504   * 122* 275*   CALCIUM 8.9 9.2 9.4   ALBUMIN 2.2* 2.3* 2.6*   PROT 5.9* 6.1 6.5   * 135* 135*   K 5.0 5.6* 5.9*   CO2 22* 25 20*    102 102   BUN 29* 52* 38*   CREATININE 2.0* 3.0* 2.7*   ALKPHOS 188* 185* 201*   ALT 12 10 15   AST 25 17 27   BILITOT 0.7 0.5 0.8      I have reviewed all pertinent labs within the past 24 hours.    Estimated Creatinine Clearance: 31.4 mL/min (A) (based on SCr of 2.7 mg/dL (H)).    Diagnostic Results:  I have reviewed and interpreted all pertinent imaging results/findings within the past 24 hours.

## 2018-04-14 NOTE — ASSESSMENT & PLAN NOTE
- Appreciate Nephrology recs.   - Continue middorine for pressure support  - HD per nephrology. Holding today.

## 2018-04-14 NOTE — ASSESSMENT & PLAN NOTE
- TTE 3/26/18 showed normal graft function but has known history of restrictive CM post transplant.  - Continue pred 7.5, [place] per G tube 3/4  - Cellcept remains on hold  - HD tomorrow.

## 2018-04-14 NOTE — SUBJECTIVE & OBJECTIVE
"Interval HPI:   Overnight events: bg above goal  Eating:   NPO  Nausea: No  Hypoglycemia and intervention: No  Fever: No  TPN and/or TF: Yes  If yes, type of TF/TPN and rate: 10cc/hr    /67 (BP Location: Right arm, Patient Position: Lying)   Pulse (!) 120   Temp 98.4 °F (36.9 °C)   Resp 20   Ht 5' 7" (1.702 m)   Wt 63.5 kg (139 lb 15.9 oz)   SpO2 98%   BMI 21.93 kg/m²     Labs Reviewed and Include      Recent Labs  Lab 04/14/18  0504   *   CALCIUM 9.4   ALBUMIN 2.6*   PROT 6.5   *   K 5.9*   CO2 20*      BUN 38*   CREATININE 2.7*   ALKPHOS 201*   ALT 15   AST 27   BILITOT 0.8     Lab Results   Component Value Date    WBC 7.03 04/14/2018    HGB 9.1 (L) 04/14/2018    HCT 29.4 (L) 04/14/2018     (H) 04/14/2018     04/14/2018     No results for input(s): TSH, FREET4 in the last 168 hours.  Lab Results   Component Value Date    HGBA1C 5.1 04/05/2018       Nutritional status:   Body mass index is 21.93 kg/m².  Lab Results   Component Value Date    ALBUMIN 2.6 (L) 04/14/2018    ALBUMIN 2.3 (L) 04/13/2018    ALBUMIN 2.2 (L) 04/12/2018     Lab Results   Component Value Date    PREALBUMIN 13 (L) 04/08/2018    PREALBUMIN 5 (L) 03/15/2018    PREALBUMIN 18 (L) 03/24/2015       Estimated Creatinine Clearance: 31.4 mL/min (A) (based on SCr of 2.7 mg/dL (H)).    Accu-Checks  Recent Labs      04/13/18   0814  04/13/18   0817  04/13/18   0847  04/13/18   1039  04/13/18   1232  04/13/18   1535  04/13/18   2115  04/14/18   0100  04/14/18   0452  04/14/18   0832   POCTGLUCOSE  52*  57*  142*  162*  203*  238*  340*  274*  293*  245*       Current Medications and/or Treatments Impacting Glycemic Control  Immunotherapy:  Immunosuppressants         Stop Route Frequency     tacrolimus (PROGRAF) 1 mg/mL oral syringe      -- PER G TUBE Daily     tacrolimus (PROGRAF) 1 mg/mL oral syringe      -- PER G TUBE Daily        Steroids:   Hormones     Start     Stop Route Frequency Ordered    04/06/18 0900 "  predniSONE tablet 7.5 mg      -- PER G TUBE Daily 04/05/18 0926        Pressors:    Autonomic Drugs     Start     Stop Route Frequency Ordered    04/13/18 0859  midodrine tablet 10 mg      -- Oral As needed (PRN) 04/13/18 0800    04/09/18 1500  midodrine tablet 2.5 mg      -- Oral 3 times daily 04/09/18 1006        Hyperglycemia/Diabetes Medications: Antihyperglycemics     Start     Stop Route Frequency Ordered    04/11/18 0015  insulin regular (Humulin R) 100 Units in sodium chloride 0.9% 100 mL infusion     Question:  Insulin Rate Adjustment (DO NOT MODIFY ANSWER)  Answer:  [email or web address]\epic\Images\Pharmacy\InsulinInfusions\InsulinRegAdj [id number].pdf    -- IV Continuous 04/10/18 2313    04/10/18 2221  insulin aspart U-100 pen 0-4 Units      -- SubQ Every 4 hours PRN 04/10/18 2122

## 2018-04-14 NOTE — PLAN OF CARE
Problem: Patient Care Overview  Goal: Plan of Care Review  Outcome: Ongoing (interventions implemented as appropriate)  Patient AAO today, on bedrest, unable to get out of bed, very deconditioned.  Sats upper 90s on trach collar 5L 28%.  Suctioned 3 times today, thick clear secretions noted.  Patients cough is strong enough to get secretions up to top of trach.  Afebrile today.  NPO, complete tube feeds, 0-5 ccs of residual noted throughout the day.  Insulin drip remains at 1.5 units per hour with ss insulin administered.  No dialysis today, anuric, no bm today.  Nitro paste applied to necrotic toes to right foot.  Heels elevated off the bed.  Patients wife at the bedside attentive to patient involved in patients care.

## 2018-04-14 NOTE — PROGRESS NOTES
[facility]  Heart Transplant  Progress Note    Patient Name: Lv Crocker  MRN: [id number]  Admission Date: 3/11/2018  Hospital Length of Stay: 33 days  Attending Physician: Kanika Ferreira MD  Primary Care Provider: Philippe Mohr MD  Principal Problem:Acute respiratory failure with hypoxia    Subjective:     Interval History: Nausea overnight. Holding HD today. All questions answered.     Continuous Infusions:   insulin (HUMAN R) infusion (adults) 1.5 Units/hr (04/14/18 1221)     Scheduled Meds:   chlorhexidine  15 mL Mouth/Throat BID    epoetin jose miguel (PROCRIT) injection  4,000 Units Intravenous Every Mon, Wed, Fri    escitalopram oxalate  20 mg Per G Tube Daily    famotidine  20 mg Per G Tube QHS    heparin (porcine)  5,000 Units Subcutaneous Q12H    levothyroxine  137 mcg Per G Tube Daily    midodrine  2.5 mg Oral TID    nitroGLYCERIN 2% TD oint  0.5 inch Topical (Top) Q12H    nortriptyline  10 mg Per OG tube QHS    oxyCODONE  10 mg Per G Tube Q12H    polyethylene glycol  17 g Oral Daily    predniSONE  7.5 mg Per G Tube Daily    sennosides 8.8 mg/5 ml  5 mL Oral QHS    tacrolimus  3 mg Per G Tube Daily    tacrolimus  4 mg Per G Tube Daily     PRN Meds:sodium chloride, sodium chloride, alprazolam ODT, heparin (porcine), insulin aspart U-100, midodrine, ondansetron, povidone-iodine, promethazine (PHENERGAN) IVPB    Review of patient's allergies indicates:   Allergen Reactions    No known drug allergies      Objective:     Vital Signs (Most Recent):  Temp: 97.2 °F (36.2 °C) (04/14/18 1138)  Pulse: 101 (04/14/18 1227)  Resp: 20 (04/14/18 1227)  BP: 109/69 (04/14/18 1138)  SpO2: (!) 94 % (04/14/18 1227) Vital Signs (24h Range):  Temp:  [97.2 °F (36.2 °C)-98.5 °F (36.9 °C)] 97.2 °F (36.2 °C)  Pulse:  [101-122] 101  Resp:  [15-24] 20  SpO2:  [92 %-100 %] 94 %  BP: ()/(48-69) 109/69     Patient Vitals for the past 72 hrs (Last 3 readings):   Weight   04/14/18 0445 63.5  kg (139 lb 15.9 oz)   04/12/18 0332 63.1 kg (139 lb 1.8 oz)     Body mass index is 21.93 kg/m².      Intake/Output Summary (Last 24 hours) at 04/14/18 1543  Last data filed at 04/14/18 0445   Gross per 24 hour   Intake           616.25 ml   Output                0 ml   Net           616.25 ml     Physical Exam   Constitutional: He appears well-developed and well-nourished. No distress.   HENT:   Head: Normocephalic and atraumatic.   Eyes: Conjunctivae and EOM are normal.   Cardiovascular: Regular rhythm.  Tachycardia present.    Pulmonary/Chest: He has no wheezes. He has no rales.   Abdominal: Soft. He exhibits no distension.   Musculoskeletal: He exhibits edema. He exhibits no tenderness or deformity.   Neurological: He is alert.   Skin: Skin is warm and dry. He is not diaphoretic.     Significant Labs:  CBC:    Recent Labs  Lab 04/12/18 0412 04/13/18 0409 04/14/18  0504   WBC 4.24 5.03 7.03   RBC 3.01* 3.01* 2.91*   HGB 9.3* 9.3* 9.1*   HCT 28.2* 28.7* 29.4*   * 168 166   MCV 94 95 101*   MCH 30.9 30.9 31.3*   MCHC 33.0 32.4 31.0*     BNP:  No results for input(s): BNP in the last 168 hours.    Invalid input(s): BNPTRIAGELBLO  CMP:    Recent Labs  Lab 04/12/18 0412 04/13/18 0409 04/14/18  0504   * 122* 275*   CALCIUM 8.9 9.2 9.4   ALBUMIN 2.2* 2.3* 2.6*   PROT 5.9* 6.1 6.5   * 135* 135*   K 5.0 5.6* 5.9*   CO2 22* 25 20*    102 102   BUN 29* 52* 38*   CREATININE 2.0* 3.0* 2.7*   ALKPHOS 188* 185* 201*   ALT 12 10 15   AST 25 17 27   BILITOT 0.7 0.5 0.8      I have reviewed all pertinent labs within the past 24 hours.    Estimated Creatinine Clearance: 31.4 mL/min (A) (based on SCr of 2.7 mg/dL (H)).    Diagnostic Results:  I have reviewed and interpreted all pertinent imaging results/findings within the past 24 hours.    Assessment and Plan:     * Acute respiratory failure with hypoxia    - Resolved.   - S/P Bronch and intubation 3/14 now post tracheostomy due to inability to extubate    - Pulmonology signed off. Completely weaned off vent.   - RSV positive early in hospitalization.   - Completed course of Ribavarin/IVIG. Per lung transplant protocol for severe RSV (given myelosuppression).   - ID had been following. Completed 7 day course of Meropenem/Linezolid earlier in hospitalization  - Appreciate PT/OT/Wound care.        Heart transplanted    - TTE 3/26/18 showed normal graft function but has known history of restrictive CM post transplant.  - Continue pred 7.5, Tacro per G tube 3/4  - Cellcept remains on hold  - HD tomorrow.         Type 1 diabetes mellitus with stage 3 chronic kidney disease    - Appreciate Endocrine's recs  - Insulin gtt per endocrine recs        Hypothyroidism due to Hashimoto's thyroiditis    - Appreciate Endocrine's recs  - Continue Levothyroxine 137mcg per NG tube        Restrictive cardiomyopathy    - No changes from before.   - Has known history of recurrent ascites and pleural effusions   - S/P thoracentesis X 2, followed by VATS/pleurex cath placement (January 2018) with removal of pleurex (February 2018) and 3rd thoracentesis 2/14/18 with no significant findings on fluid removal.        Acute renal failure superimposed on stage 4 chronic kidney disease    - Appreciate Nephrology recs.   - Continue middorine for pressure support  - HD per nephrology. Holding today.         Anemia    - Transfused 2 units of PRBCs on 4/11 during HD  - Nephrology has resumed ProCrit         Debility    - PT/OT daily   - This will take time and require adequate nutrition   - PEG tube placed and tolerating feeds.   - Nutrition following.         Anxiety    - Continue PTA nortriptyline / escitalopram  - Continue prn low dose xanax ODT.             Mamadou rhodes, DO  Heart Transplant  [facility]

## 2018-04-14 NOTE — ASSESSMENT & PLAN NOTE
- Resolved.   - S/P Bronch and intubation 3/14 now post tracheostomy due to inability to extubate   - Pulmonology signed off. Completely weaned off vent.   - RSV positive early in hospitalization.   - Completed course of Ribavarin/IVIG. Per lung transplant protocol for severe RSV (given myelosuppression).   - ID had been following. Completed 7 day course of Meropenem/Linezolid earlier in hospitalization  - Appreciate PT/OT/Wound care.

## 2018-04-14 NOTE — ASSESSMENT & PLAN NOTE
- PT/OT daily   - This will take time and require adequate nutrition   - PEG tube placed and tolerating feeds.   - Nutrition following.

## 2018-04-15 NOTE — SUBJECTIVE & OBJECTIVE
Interval History:   Mild hyperkalemia this morning @ 5.9, otherwise, electrolytes stable. FK-506 level was 3.7. Tolerated SLED overnight came of at 3 am. Remains anuric, net neg 249L/24h.  He feels better today.     Review of patient's allergies indicates:   Allergen Reactions    No known drug allergies      Current Facility-Administered Medications   Medication Frequency    0.9%  NaCl infusion (for blood administration) Q24H PRN    0.9%  NaCl infusion (for blood administration) Q24H PRN    alprazolam ODT dissolvable tablet 0.5 mg TID PRN    chlorhexidine 0.12 % solution 15 mL BID    epoetin jose miguel injection 4,000 Units Every Mon, Wed, Fri    escitalopram oxalate tablet 20 mg Daily    famotidine tablet 20 mg QHS    heparin (porcine) injection 1,000 Units PRN    heparin (porcine) injection 5,000 Units Q12H    insulin aspart U-100 pen 0-4 Units Q4H PRN    insulin regular (Humulin R) 100 Units in sodium chloride 0.9% 100 mL infusion Continuous    levothyroxine tablet 137 mcg Daily    midodrine tablet 10 mg PRN    midodrine tablet 2.5 mg TID    nitroGLYCERIN 2% TD oint ointment 0.5 inch Q12H    nortriptyline capsule 10 mg QHS    ondansetron disintegrating tablet 4 mg Q6H PRN    oxyCODONE 5 mg/5 mL solution 10 mg Q12H    polyethylene glycol packet 17 g Daily    povidone-iodine 10 % ointment PRN    predniSONE tablet 7.5 mg Daily    promethazine (PHENERGAN) 6.25 mg in dextrose 5 % 50 mL IVPB Q6H PRN    sennosides 8.8 mg/5 ml syrup 5 mL QHS    tacrolimus (PROGRAF) 1 mg/mL oral syringe Daily    tacrolimus (PROGRAF) 1 mg/mL oral syringe Daily       Objective:     Vital Signs (Most Recent):  Temp: 97.6 °F (36.4 °C) (04/14/18 1652)  Pulse: (!) 115 (04/14/18 1652)  Resp: 16 (04/14/18 1652)  BP: (!) 112/53 (04/14/18 1652)  SpO2: 97 % (04/14/18 1652)  O2 Device (Oxygen Therapy): Trach Collar (04/14/18 1606) Vital Signs (24h Range):  Temp:  [97.2 °F (36.2 °C)-98.4 °F (36.9 °C)] 97.6 °F (36.4 °C)  Pulse:   [101-122] 115  Resp:  [15-24] 16  SpO2:  [94 %-100 %] 97 %  BP: (103-118)/(53-69) 112/53     Weight: 63.5 kg (139 lb 15.9 oz) (04/14/18 0445)  Body mass index is 21.93 kg/m².  Body surface area is 1.73 meters squared.    I/O last 3 completed shifts:  In: 2364.9 [I.V.:37.4; Other:550; NG/GT:1727.5; IV Piggyback:50]  Out: 2050 [Other:2050]    Physical Exam   Constitutional: He appears well-developed and well-nourished. No distress.   HENT:   Head: Normocephalic and atraumatic.   Eyes: Conjunctivae and EOM are normal.   Cardiovascular: Regular rhythm.  Tachycardia present.    Pulmonary/Chest: He has no wheezes. He has no rales.   Abdominal: Soft. He exhibits no distension.   Musculoskeletal: He exhibits edema. He exhibits no tenderness or deformity.   Neurological: He is alert.   Skin: Skin is warm and dry. He is not diaphoretic.       Significant Labs:  CBC:     Recent Labs  Lab 04/14/18  0504   WBC 7.03   RBC 2.91*   HGB 9.1*   HCT 29.4*      *   MCH 31.3*   MCHC 31.0*     CMP:     Recent Labs  Lab 04/14/18  0504   *   CALCIUM 9.4   ALBUMIN 2.6*   PROT 6.5   *   K 5.9*   CO2 20*      BUN 38*   CREATININE 2.7*   ALKPHOS 201*   ALT 15   AST 27   BILITOT 0.8

## 2018-04-15 NOTE — PROGRESS NOTES
[facility]  Heart Transplant  Progress Note    Patient Name: Lv Crocker  MRN: [id number]  Admission Date: 3/11/2018  Hospital Length of Stay: 34 days  Attending Physician: Kanika Ferreira MD  Primary Care Provider: Philippe Mohr MD  Principal Problem:Acute respiratory failure with hypoxia    Subjective:     Interval History: No acute issues overnight. Appears more awake this morning. All questions answered.     Continuous Infusions:   insulin (HUMAN R) infusion (adults) 1.5 Units/hr (04/15/18 0109)     Scheduled Meds:   chlorhexidine  15 mL Mouth/Throat BID    epoetin jose miguel (PROCRIT) injection  4,000 Units Intravenous Every Mon, Wed, Fri    escitalopram oxalate  20 mg Per G Tube Daily    famotidine  20 mg Per G Tube QHS    heparin (porcine)  5,000 Units Subcutaneous Q12H    levothyroxine  137 mcg Per G Tube Daily    midodrine  2.5 mg Oral TID    nitroGLYCERIN 2% TD oint  0.5 inch Topical (Top) Q12H    nortriptyline  10 mg Per OG tube QHS    oxyCODONE  10 mg Per G Tube Q12H    polyethylene glycol  17 g Oral Daily    predniSONE  7.5 mg Per G Tube Daily    sennosides 8.8 mg/5 ml  5 mL Oral QHS    tacrolimus  3 mg Per G Tube Daily    tacrolimus  4 mg Per G Tube Daily     PRN Meds:sodium chloride, sodium chloride, alprazolam ODT, heparin (porcine), insulin aspart U-100, midodrine, ondansetron, povidone-iodine, promethazine (PHENERGAN) IVPB    Review of patient's allergies indicates:   Allergen Reactions    No known drug allergies      Objective:     Vital Signs (Most Recent):  Temp: 98.3 °F (36.8 °C) (04/15/18 0333)  Pulse: (!) 112 (04/15/18 0600)  Resp: 15 (04/15/18 0333)  BP: 108/66 (04/15/18 0333)  SpO2: 98 % (04/15/18 0333) Vital Signs (24h Range):  Temp:  [97.2 °F (36.2 °C)-98.6 °F (37 °C)] 98.3 °F (36.8 °C)  Pulse:  [101-117] 112  Resp:  [14-20] 15  SpO2:  [94 %-98 %] 98 %  BP: (108-116)/(53-73) 108/66     Patient Vitals for the past 72 hrs (Last 3 readings):   Weight    04/15/18 0445 65 kg (143 lb 4.8 oz)   04/14/18 0445 63.5 kg (139 lb 15.9 oz)     Body mass index is 22.44 kg/m².      Intake/Output Summary (Last 24 hours) at 04/15/18 0932  Last data filed at 04/15/18 0445   Gross per 24 hour   Intake          1279.71 ml   Output                0 ml   Net          1279.71 ml     Physical Exam   Constitutional: He appears well-developed and well-nourished. No distress.   HENT:   Head: Normocephalic and atraumatic.   Eyes: Conjunctivae and EOM are normal.   Cardiovascular: Regular rhythm.  Tachycardia present.    Pulmonary/Chest: He has no wheezes. He has no rales.   Abdominal: Soft. He exhibits no distension.   Musculoskeletal: He exhibits edema. He exhibits no tenderness or deformity.   Neurological: He is alert.   Skin: Skin is warm and dry. He is not diaphoretic.     Significant Labs:  CBC:    Recent Labs  Lab 04/13/18  0409 04/14/18  0504 04/15/18  0743   WBC 5.03 7.03 5.90   RBC 3.01* 2.91* 2.89*   HGB 9.3* 9.1* 8.9*   HCT 28.7* 29.4* 29.3*    166 191   MCV 95 101* 101*   MCH 30.9 31.3* 30.8   MCHC 32.4 31.0* 30.4*     CMP:    Recent Labs  Lab 04/13/18  0409 04/14/18  0504 04/15/18  0743   * 275* 260*   CALCIUM 9.2 9.4 9.8   ALBUMIN 2.3* 2.6* 2.2*   PROT 6.1 6.5 6.1   * 135* 136   K 5.6* 5.9* 5.8*   CO2 25 20* 27    102 101   BUN 52* 38* 62*   CREATININE 3.0* 2.7* 3.6*   ALKPHOS 185* 201* 191*   ALT 10 15 11   AST 17 27 15   BILITOT 0.5 0.8 0.6      I have reviewed all pertinent labs within the past 24 hours.    Estimated Creatinine Clearance: 24.1 mL/min (A) (based on SCr of 3.6 mg/dL (H)).    Diagnostic Results:  I have reviewed and interpreted all pertinent imaging results/findings within the past 24 hours.    Assessment and Plan:     * Acute respiratory failure with hypoxia    - Resolved.   - S/P Bronch and intubation 3/14 now post tracheostomy due to inability to extubate   - Pulmonology signed off. Completely weaned off vent.   - RSV positive  early and completed course of Ribavarin/IVIG.  - ID had been following. Completed 7 day course of Meropenem/Linezolid earlier in hospitalization  - Appreciate PT/OT/Wound care.        Heart transplanted    - TTE 3/26/18 showed normal graft function but has known history of restrictive CM post transplant.  - Continue pred 7.5, Tacro per G tube 3/4  - Cellcept remains on hold  - HD today         Type 1 diabetes mellitus with stage 3 chronic kidney disease    - Appreciate Endocrine's recs  - Insulin gtt per endocrine recs        Hypothyroidism due to Hashimoto's thyroiditis    - Appreciate Endocrine's recs  - Continue Levothyroxine 137mcg per NG tube        Restrictive cardiomyopathy    - No changes from before.   - Has known history of recurrent ascites and pleural effusions   - S/P thoracentesis X 2, followed by VATS/pleurex cath placement (January 2018) with removal of pleurex (February 2018) and 3rd thoracentesis 2/14/18 with no significant findings on fluid removal.        Acute renal failure superimposed on stage 4 chronic kidney disease    - Appreciate Nephrology recs.   - Continue middorine for pressure support  - HD per nephrology.         Anemia    - Transfused 2 units of PRBCs on 4/11 during HD  - Nephrology has resumed ProCrit         Debility    - PT/OT daily.   - Expect long road to functional recovery.   - PEG tube placed and tolerating feeds.   - Nutrition following.         Anxiety    - Continue PTA nortriptyline / escitalopram  - Continue 7low dose xanax PRN            Mamadou rhodes, DO  Heart Transplant  [facility]

## 2018-04-15 NOTE — PLAN OF CARE
Problem: Patient Care Overview  Goal: Plan of Care Review  Outcome: Ongoing (interventions implemented as appropriate)  No acute events. Plan for HD today. Tachycardic on telemetry, VSS otherwise. Trach collar, #6 shiley, 5 L, 28%. SpO2 obtained from right ear d/t poor reading from fingers. -278, ss coverage provided. Insulin gtt remains at 1.5 unit/hr. NPO with meds per PEG and tolerating TF at 50 ml/hr, no residual. HOB maintained > 30 degrees at all times. Trach suction x 1 this shift with thin, yellowish secretions noted. Nitro paste applied to affected toes on right foot. DTI to left heel dressing CDI. C/o pain to bilateral feet resolved after scheduled oxycodone. Nausea x 1 resolved after PRN phenergan. Fall precautions maintained. Bed wheels locked, bed in lowest position, upper SR up x 2, call light in reach, non-skid socks when OOB. Instructed pt to call for assistance as needed. POC discussed with pt and spouse. Will cont to monitor.

## 2018-04-15 NOTE — PROGRESS NOTES
"[facility]  Endocrinology  Progress Note    Admit Date: 3/11/2018     Reason for Consult: abnormal TFTs    Surgical Procedure and Date: s/p heart transplant 2014     Diabetes diagnosis year: 5yo (T1DM)     Home Diabetes Medications:    Levemir 15u qHS  Novolog 4u AC     How often checking glucose at home? 4 times daily   BG readings on regimen: 150-200s  Hypoglycemia on the regimen?  Yes, rarely - treats appropriately (light snack, glucose tab)  Missed doses on regimen?  No        Diabetes Complications include:     Hyperglycemia, Hypoglycemia  and Diabetic chronic kidney disease          Complicating diabetes co morbidities:   N/A     HPI:   Patient is a 42 y.o. male with a diagnosis of T1DM, HTN, hypothyroidism, s/p heart transplant.  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 He required serial paracentesis and thoracentesis until he underwent a VATS with pleurX catheter placement on 1/11/2018 and removed 2/7/18.       Presented to hospital secondary to diarrhea and cough.  Upon initial labs, TSH was decreased (0.101) and free T4 1.33.  Endocrinology consulted to assist in management of these labs.  He was last seen in clinic by Kamala Vázquez NP 11/2017.   Previous hospitalization, endocrine consulted for same problem.  During that visit, recommended decreasing LT4 to 125mcg daily. Unfortunately, patient was discharged on previous dose of 150mcg daily.     Interval HPI:   Overnight events: bg remains elevated and tsh remains mildly elevated  Eating:   NPO  Nausea: No  Hypoglycemia and intervention: No  Fever: No  TPN and/or TF: Yes  If yes, type of TF/TPN and rate: 50cc/hr    /66 (BP Location: Right arm, Patient Position: Lying)   Pulse (!) 112   Temp 98.3 °F (36.8 °C) (Oral)   Resp 15   Ht 5' 7" (1.702 m)   Wt 65 kg (143 lb 4.8 oz)   SpO2 98%   BMI 22.44 kg/m²       Labs " Reviewed and Include      Recent Labs  Lab 04/15/18  0743   *   CALCIUM 9.8   ALBUMIN 2.2*   PROT 6.1      K 5.8*   CO2 27      BUN 62*   CREATININE 3.6*   ALKPHOS 191*   ALT 11   AST 15   BILITOT 0.6     Lab Results   Component Value Date    WBC 5.90 04/15/2018    HGB 8.9 (L) 04/15/2018    HCT 29.3 (L) 04/15/2018     (H) 04/15/2018     04/15/2018       Recent Labs  Lab 04/15/18  0743   TSH 12.752*   FREET4 0.94     Lab Results   Component Value Date    HGBA1C 5.1 04/05/2018       Nutritional status:   Body mass index is 22.44 kg/m².  Lab Results   Component Value Date    ALBUMIN 2.2 (L) 04/15/2018    ALBUMIN 2.6 (L) 04/14/2018    ALBUMIN 2.3 (L) 04/13/2018     Lab Results   Component Value Date    PREALBUMIN 13 (L) 04/08/2018    PREALBUMIN 5 (L) 03/15/2018    PREALBUMIN 18 (L) 03/24/2015       Estimated Creatinine Clearance: 24.1 mL/min (A) (based on SCr of 3.6 mg/dL (H)).    Accu-Checks  Recent Labs      04/13/18   2115  04/14/18   0100  04/14/18   0452  04/14/18   0832  04/14/18   1219  04/14/18   1725  04/14/18   2108  04/15/18   0108  04/15/18   0454  04/15/18   0851   POCTGLUCOSE  340*  274*  293*  245*  246*  203*  235*  278*  318*  257*       Current Medications and/or Treatments Impacting Glycemic Control  Immunotherapy:  Immunosuppressants         Stop Route Frequency     tacrolimus (PROGRAF) 1 mg/mL oral syringe      -- PER G TUBE Daily     tacrolimus (PROGRAF) 1 mg/mL oral syringe      -- PER G TUBE Daily        Steroids:   Hormones     Start     Stop Route Frequency Ordered    04/06/18 0900  predniSONE tablet 7.5 mg      -- PER G TUBE Daily 04/05/18 0926        Pressors:    Autonomic Drugs     Start     Stop Route Frequency Ordered    04/13/18 0859  midodrine tablet 10 mg      -- Oral As needed (PRN) 04/13/18 0800    04/09/18 1500  midodrine tablet 2.5 mg      -- Oral 3 times daily 04/09/18 1006        Hyperglycemia/Diabetes Medications: Antihyperglycemics     Start      Stop Route Frequency Ordered    04/11/18 0015  insulin regular (Humulin R) 100 Units in sodium chloride 0.9% 100 mL infusion     Question:  Insulin Rate Adjustment (DO NOT MODIFY ANSWER)  Answer:  [email or web address]    -- IV Continuous 04/10/18 2313    04/10/18 2221  insulin aspart U-100 pen 0-4 Units      -- SubQ Every 4 hours PRN 04/10/18 2122          ASSESSMENT and PLAN    * Acute respiratory failure with hypoxia    Per primary  S/p trach.  Practicing with speech valve.  Remains NPO        Type 1 diabetes mellitus with stage 3 chronic kidney disease    BG goal 140-180     Increase transition gtt to 2.0 u/hr  bg monitoring q4hrs     Pt is T1DM, do not suspend insulin >1 hr   If TF held, decrease insulin rate to 0.2u/hr - known to have controlled BG on basal needs of lev 5 daily/ 0.2u/hr during this hospitalization        Discharge Recommendations:  TBD.        Hypothyroidism due to Hashimoto's thyroiditis    tsh remains elevated, increase levothyroxine to 150mcg daily  Spoke with nursing and will hold TF for 45 mins daily to administer levothyroxine      Recheck tft's in 2-3 weeks          On enteral nutrition    TF at goal.  May increase insulin needs        Current chronic use of systemic steroids    On PDN 7.5mg daily  Can increase insulin requirement        Heart transplanted    Per transplant  Avoid hypoglycemia  On PDN and prograf - could lead to prandial elevations and insulin resistance.            Ankur Keith MD  Endocrinology  [facility]    Agree with fellow's note   Increase in transition drip to 1.8 u/hr

## 2018-04-15 NOTE — ASSESSMENT & PLAN NOTE
BG goal 140-180     Increase transition gtt to 2.0 u/hr  bg monitoring q4hrs     Pt is T1DM, do not suspend insulin >1 hr   If TF held, decrease insulin rate to 0.2u/hr - known to have controlled BG on basal needs of lev 5 daily/ 0.2u/hr during this hospitalization        Discharge Recommendations:  TBD.

## 2018-04-15 NOTE — ASSESSMENT & PLAN NOTE
- PT/OT daily.   - Expect long road to functional recovery.   - PEG tube placed and tolerating feeds.   - Nutrition following.

## 2018-04-15 NOTE — ASSESSMENT & PLAN NOTE
- baseline sCr 2.6-3.0 consistent with CKD stage IV  - anuric CACHORRO; likely ischemic ATN from prolonged pre-renal state (diarrhea) in setting of prograf renal vasoconstriction; cannot r/o septic ATN or Prograf toxicity  - SLED started 3/14. TDC placed 4/4/18.   - remains anuric  - Blood pressure have been stable today   -Tolerated HD treatment well on 04/12 with 1L net fluid removal.    Plan:  HD overnight tolerated well   Achieved UF goal of 2L Net neg balance 249 ml/24hrs  Midodrine 10 mg PO 30 minutes prior to HD to prevent IDH zelalem plan for HD in am  Procrit with HD  Albumin 25 g x 1 PRN during HD treatment.  Recommend changing tube feeds to Nepro, as this is the likely cause of his hyperkalemia. He is still on Glucerna and suspect this maybe elevating his K

## 2018-04-15 NOTE — ASSESSMENT & PLAN NOTE
- Resolved.   - S/P Bronch and intubation 3/14 now post tracheostomy due to inability to extubate   - Pulmonology signed off. Completely weaned off vent.   - RSV positive early and completed course of Ribavarin/IVIG.  - ID had been following. Completed 7 day course of Meropenem/Linezolid earlier in hospitalization  - Appreciate PT/OT/Wound care.

## 2018-04-15 NOTE — ASSESSMENT & PLAN NOTE
tsh remains elevated, increase levothyroxine to 150mcg daily  Spoke with nursing and will hold TF for 45 mins daily to administer levothyroxine      Recheck tft's in 2-3 weeks

## 2018-04-15 NOTE — PLAN OF CARE
Problem: Patient Care Overview  Goal: Plan of Care Review  Outcome: Ongoing (interventions implemented as appropriate)  Patient doing well today.  AAO, completely dependent with activity.  Incontinent of stool.  Sats upper 90s on trach collar 5L 28%.  Very little suctioning required today, cough is good and productive.  BG in the 200s today, insulin infusion adjusted, currently at 1.8.  Patient tolerting tube feedings residual 5-25 ccs note.  Dialysis completed 1L removed tolerated well.  Wife at the bedside attentive to patient involved in patients care.

## 2018-04-15 NOTE — SUBJECTIVE & OBJECTIVE
"Interval HPI:   Overnight events: bg remains elevated and tsh remains mildly elevated  Eating:   NPO  Nausea: No  Hypoglycemia and intervention: No  Fever: No  TPN and/or TF: Yes  If yes, type of TF/TPN and rate: 50cc/hr    /66 (BP Location: Right arm, Patient Position: Lying)   Pulse (!) 112   Temp 98.3 °F (36.8 °C) (Oral)   Resp 15   Ht 5' 7" (1.702 m)   Wt 65 kg (143 lb 4.8 oz)   SpO2 98%   BMI 22.44 kg/m²     Labs Reviewed and Include      Recent Labs  Lab 04/15/18  0743   *   CALCIUM 9.8   ALBUMIN 2.2*   PROT 6.1      K 5.8*   CO2 27      BUN 62*   CREATININE 3.6*   ALKPHOS 191*   ALT 11   AST 15   BILITOT 0.6     Lab Results   Component Value Date    WBC 5.90 04/15/2018    HGB 8.9 (L) 04/15/2018    HCT 29.3 (L) 04/15/2018     (H) 04/15/2018     04/15/2018       Recent Labs  Lab 04/15/18  0743   TSH 12.752*   FREET4 0.94     Lab Results   Component Value Date    HGBA1C 5.1 04/05/2018       Nutritional status:   Body mass index is 22.44 kg/m².  Lab Results   Component Value Date    ALBUMIN 2.2 (L) 04/15/2018    ALBUMIN 2.6 (L) 04/14/2018    ALBUMIN 2.3 (L) 04/13/2018     Lab Results   Component Value Date    PREALBUMIN 13 (L) 04/08/2018    PREALBUMIN 5 (L) 03/15/2018    PREALBUMIN 18 (L) 03/24/2015       Estimated Creatinine Clearance: 24.1 mL/min (A) (based on SCr of 3.6 mg/dL (H)).    Accu-Checks  Recent Labs      04/13/18   2115  04/14/18   0100  04/14/18   0452  04/14/18   0832  04/14/18   1219  04/14/18   1725  04/14/18   2108  04/15/18   0108  04/15/18   0454  04/15/18   0851   POCTGLUCOSE  340*  274*  293*  245*  246*  203*  235*  278*  318*  257*       Current Medications and/or Treatments Impacting Glycemic Control  Immunotherapy:  Immunosuppressants         Stop Route Frequency     tacrolimus (PROGRAF) 1 mg/mL oral syringe      -- PER G TUBE Daily     tacrolimus (PROGRAF) 1 mg/mL oral syringe      -- PER G TUBE Daily        Steroids:   Hormones     Start    "  Stop Route Frequency Ordered    04/06/18 0900  predniSONE tablet 7.5 mg      -- PER G TUBE Daily 04/05/18 0926        Pressors:    Autonomic Drugs     Start     Stop Route Frequency Ordered    04/13/18 0859  midodrine tablet 10 mg      -- Oral As needed (PRN) 04/13/18 0800    04/09/18 1500  midodrine tablet 2.5 mg      -- Oral 3 times daily 04/09/18 1006        Hyperglycemia/Diabetes Medications: Antihyperglycemics     Start     Stop Route Frequency Ordered    04/11/18 0015  insulin regular (Humulin R) 100 Units in sodium chloride 0.9% 100 mL infusion     Question:  Insulin Rate Adjustment (DO NOT MODIFY ANSWER)  Answer:  [email or web address]    -- IV Continuous 04/10/18 2313    04/10/18 2221  insulin aspart U-100 pen 0-4 Units      -- SubQ Every 4 hours PRN 04/10/18 2122

## 2018-04-15 NOTE — SUBJECTIVE & OBJECTIVE
Interval History: No acute issues overnight. Appears more awake this morning. All questions answered.     Continuous Infusions:   insulin (HUMAN R) infusion (adults) 1.5 Units/hr (04/15/18 0109)     Scheduled Meds:   chlorhexidine  15 mL Mouth/Throat BID    epoetin jose miguel (PROCRIT) injection  4,000 Units Intravenous Every Mon, Wed, Fri    escitalopram oxalate  20 mg Per G Tube Daily    famotidine  20 mg Per G Tube QHS    heparin (porcine)  5,000 Units Subcutaneous Q12H    levothyroxine  137 mcg Per G Tube Daily    midodrine  2.5 mg Oral TID    nitroGLYCERIN 2% TD oint  0.5 inch Topical (Top) Q12H    nortriptyline  10 mg Per OG tube QHS    oxyCODONE  10 mg Per G Tube Q12H    polyethylene glycol  17 g Oral Daily    predniSONE  7.5 mg Per G Tube Daily    sennosides 8.8 mg/5 ml  5 mL Oral QHS    tacrolimus  3 mg Per G Tube Daily    tacrolimus  4 mg Per G Tube Daily     PRN Meds:sodium chloride, sodium chloride, alprazolam ODT, heparin (porcine), insulin aspart U-100, midodrine, ondansetron, povidone-iodine, promethazine (PHENERGAN) IVPB    Review of patient's allergies indicates:   Allergen Reactions    No known drug allergies      Objective:     Vital Signs (Most Recent):  Temp: 98.3 °F (36.8 °C) (04/15/18 0333)  Pulse: (!) 112 (04/15/18 0600)  Resp: 15 (04/15/18 0333)  BP: 108/66 (04/15/18 0333)  SpO2: 98 % (04/15/18 0333) Vital Signs (24h Range):  Temp:  [97.2 °F (36.2 °C)-98.6 °F (37 °C)] 98.3 °F (36.8 °C)  Pulse:  [101-117] 112  Resp:  [14-20] 15  SpO2:  [94 %-98 %] 98 %  BP: (108-116)/(53-73) 108/66     Patient Vitals for the past 72 hrs (Last 3 readings):   Weight   04/15/18 0445 65 kg (143 lb 4.8 oz)   04/14/18 0445 63.5 kg (139 lb 15.9 oz)     Body mass index is 22.44 kg/m².      Intake/Output Summary (Last 24 hours) at 04/15/18 0932  Last data filed at 04/15/18 0445   Gross per 24 hour   Intake          1279.71 ml   Output                0 ml   Net          1279.71 ml     Physical Exam    Constitutional: He appears well-developed and well-nourished. No distress.   HENT:   Head: Normocephalic and atraumatic.   Eyes: Conjunctivae and EOM are normal.   Cardiovascular: Regular rhythm.  Tachycardia present.    Pulmonary/Chest: He has no wheezes. He has no rales.   Abdominal: Soft. He exhibits no distension.   Musculoskeletal: He exhibits edema. He exhibits no tenderness or deformity.   Neurological: He is alert.   Skin: Skin is warm and dry. He is not diaphoretic.     Significant Labs:  CBC:    Recent Labs  Lab 04/13/18  0409 04/14/18  0504 04/15/18  0743   WBC 5.03 7.03 5.90   RBC 3.01* 2.91* 2.89*   HGB 9.3* 9.1* 8.9*   HCT 28.7* 29.4* 29.3*    166 191   MCV 95 101* 101*   MCH 30.9 31.3* 30.8   MCHC 32.4 31.0* 30.4*     CMP:    Recent Labs  Lab 04/13/18  0409 04/14/18  0504 04/15/18  0743   * 275* 260*   CALCIUM 9.2 9.4 9.8   ALBUMIN 2.3* 2.6* 2.2*   PROT 6.1 6.5 6.1   * 135* 136   K 5.6* 5.9* 5.8*   CO2 25 20* 27    102 101   BUN 52* 38* 62*   CREATININE 3.0* 2.7* 3.6*   ALKPHOS 185* 201* 191*   ALT 10 15 11   AST 17 27 15   BILITOT 0.5 0.8 0.6      I have reviewed all pertinent labs within the past 24 hours.    Estimated Creatinine Clearance: 24.1 mL/min (A) (based on SCr of 3.6 mg/dL (H)).    Diagnostic Results:  I have reviewed and interpreted all pertinent imaging results/findings within the past 24 hours.

## 2018-04-15 NOTE — PROGRESS NOTES
2 hour dialysis complete.  Blood rinsed back.  LIJ permcath flushed with normal saline, both lumens filled with heparin, capped and taped.  Net UF 1L.  Tolerated well.

## 2018-04-15 NOTE — PROGRESS NOTES
[facility]  Nephrology  Progress Note    Patient Name: Lv Crocker  MRN: [id number]  Admission Date: 3/11/2018  Hospital Length of Stay: 33 days  Attending Provider: Kanika Ferreira MD   Primary Care Physician: Philippe Mohr MD  Principal Problem:Acute respiratory failure with hypoxia    Subjective:     HPI: Mr. Crocker is a 41 yo WM with T1DM, Hashimoto thyroiditis, h/o OHTx 11/2014, and CKD stage 4 who was admitted on 3/11/18 for persistent diarrhea. He reported a 3 week history of diarrhea up to 15x/day. Associated symptoms including URI symptoms, coughing fits, and coughing syncope. Prograf level was 14.3. His post-heart transplant course has been complicated by AMR 4/2015, CMV, post-transplant restrictive cardiomyopathy, recurrent pleural effusions/ascites requiring monthly thoracenteses/paracenteses, VATS 1/11/18 with Pleur-X catheter (now removed), and CKD stage 4 with baseline sCr 2.6-3.0. Baseline -120s and baseline BP 90s-110s/60-70s. Upon admission he was started on IVF and diarrhea workup was initiated. On 3/12 he was noted to have decreased UOP despite fluids; NS was increased to 100cc/hr. He was scheduled for a colonoscopy on 3/13 however procedure was cancelled due to hypoxia and fever. He was transferred to the ICU for closer monitoring. He continued to have oliguria overnight. Bladder scan revealed 200cc of urine but patient refused osullivan catheter placement. Wife reports that patient always has a hard time urinating again after osullivan catheters are placed/removed so he refuses them. He remained hypoxic despite FiO2 70% and ABG revealed combined respiratory and metabolic acidosis with pH 7.17. He was started on BiPAP as well as a bicarb infusion at 50cc/hr. ABG with mild improvement. AM labs revealed K 6.2 and he was shifted and given kayexalate.Trialysis catheter was placed this morning and he subsequently developed hypotension and was started on levophed. He was  intubated later this morning. Nephrology consulted for CACHORRO. All history obtained by primary team and chart review as patient was intubated/sedated on exam. Consent for dialysis obtained by patient's wife and placed in chart. Of note, both of patient's parents were on dialysis.     Interval History:   Mild hyperkalemia this morning @ 5.9, otherwise, electrolytes stable. FK-506 level was 3.7. Tolerated SLED overnight came of at 3 am. Remains anuric, net neg 249L/24h.  He feels better today.     Review of patient's allergies indicates:   Allergen Reactions    No known drug allergies      Current Facility-Administered Medications   Medication Frequency    0.9%  NaCl infusion (for blood administration) Q24H PRN    0.9%  NaCl infusion (for blood administration) Q24H PRN    alprazolam ODT dissolvable tablet 0.5 mg TID PRN    chlorhexidine 0.12 % solution 15 mL BID    epoetin jose miguel injection 4,000 Units Every Mon, Wed, Fri    escitalopram oxalate tablet 20 mg Daily    famotidine tablet 20 mg QHS    heparin (porcine) injection 1,000 Units PRN    heparin (porcine) injection 5,000 Units Q12H    insulin aspart U-100 pen 0-4 Units Q4H PRN    insulin regular (Humulin R) 100 Units in sodium chloride 0.9% 100 mL infusion Continuous    levothyroxine tablet 137 mcg Daily    midodrine tablet 10 mg PRN    midodrine tablet 2.5 mg TID    nitroGLYCERIN 2% TD oint ointment 0.5 inch Q12H    nortriptyline capsule 10 mg QHS    ondansetron disintegrating tablet 4 mg Q6H PRN    oxyCODONE 5 mg/5 mL solution 10 mg Q12H    polyethylene glycol packet 17 g Daily    povidone-iodine 10 % ointment PRN    predniSONE tablet 7.5 mg Daily    promethazine (PHENERGAN) 6.25 mg in dextrose 5 % 50 mL IVPB Q6H PRN    sennosides 8.8 mg/5 ml syrup 5 mL QHS    tacrolimus (PROGRAF) 1 mg/mL oral syringe Daily    tacrolimus (PROGRAF) 1 mg/mL oral syringe Daily       Objective:     Vital Signs (Most Recent):  Temp: 97.6 °F (36.4 °C) (04/14/18  1652)  Pulse: (!) 115 (04/14/18 1652)  Resp: 16 (04/14/18 1652)  BP: (!) 112/53 (04/14/18 1652)  SpO2: 97 % (04/14/18 1652)  O2 Device (Oxygen Therapy): Trach Collar (04/14/18 1606) Vital Signs (24h Range):  Temp:  [97.2 °F (36.2 °C)-98.4 °F (36.9 °C)] 97.6 °F (36.4 °C)  Pulse:  [101-122] 115  Resp:  [15-24] 16  SpO2:  [94 %-100 %] 97 %  BP: (103-118)/(53-69) 112/53     Weight: 63.5 kg (139 lb 15.9 oz) (04/14/18 0445)  Body mass index is 21.93 kg/m².  Body surface area is 1.73 meters squared.    I/O last 3 completed shifts:  In: 2364.9 [I.V.:37.4; Other:550; NG/GT:1727.5; IV Piggyback:50]  Out: 2050 [Other:2050]    Physical Exam   Constitutional: He appears well-developed and well-nourished. No distress.   HENT:   Head: Normocephalic and atraumatic.   Eyes: Conjunctivae and EOM are normal.   Cardiovascular: Regular rhythm.  Tachycardia present.    Pulmonary/Chest: He has no wheezes. He has no rales.   Abdominal: Soft. He exhibits no distension.   Musculoskeletal: He exhibits edema. He exhibits no tenderness or deformity.   Neurological: He is alert.   Skin: Skin is warm and dry. He is not diaphoretic.       Significant Labs:  CBC:     Recent Labs  Lab 04/14/18  0504   WBC 7.03   RBC 2.91*   HGB 9.1*   HCT 29.4*      *   MCH 31.3*   MCHC 31.0*     CMP:     Recent Labs  Lab 04/14/18  0504   *   CALCIUM 9.4   ALBUMIN 2.6*   PROT 6.5   *   K 5.9*   CO2 20*      BUN 38*   CREATININE 2.7*   ALKPHOS 201*   ALT 15   AST 27   BILITOT 0.8          Assessment/Plan:     Acute renal failure superimposed on stage 4 chronic kidney disease    - baseline sCr 2.6-3.0 consistent with CKD stage IV  - anuric CACHORRO; likely ischemic ATN from prolonged pre-renal state (diarrhea) in setting of prograf renal vasoconstriction; cannot r/o septic ATN or Prograf toxicity  - SLED started 3/14. TDC placed 4/4/18.   - remains anuric  - Blood pressure have been stable today   -Tolerated HD treatment well on 04/12 with  1L net fluid removal.    Plan:  HD overnight tolerated well   Achieved UF goal of 2L Net neg balance 249 ml/24hrs  Midodrine 10 mg PO 30 minutes prior to HD to prevent IDH zelalem plan for HD in am  Procrit with HD  Albumin 25 g x 1 PRN during HD treatment.  Recommend changing tube feeds to Nepro, as this is the likely cause of his hyperkalemia. He is still on Glucerna and suspect this maybe elevating his K            Thank you for your consult. I will follow-up with patient. Please contact us if you have any additional questions.    Eulalio Mariee MD  Nephrology  [facility]        I have reviewed and concur with the fellow's history, physical, assessment, and plan. I have personally interviewed and examined the patient at bedside

## 2018-04-15 NOTE — ASSESSMENT & PLAN NOTE
- TTE 3/26/18 showed normal graft function but has known history of restrictive CM post transplant.  - Continue pred 7.5, Tacro per G tube 3/4  - Cellcept remains on hold  - HD today

## 2018-04-16 NOTE — PROGRESS NOTES
Update:    SW to pt's room for update. Pt and wife aaox4. Pt seated in chair working with PT/OT. Pt's wife reports plan to return to work once pt is in LTAC. Pt's wife reports discussing plan with Dr. Lara, and expecting D/C next week. Pt and wife report no needs from SW at this time. SW providing ongoing psychosocial and counseling support, education, assistance, resources, and discharge planning as indicated. SW continuing to follow and remains available.

## 2018-04-16 NOTE — PROGRESS NOTES
Patient received from floor in own bed with report from SANFORD Bills. Tube feeding running at 50 cc/hr. And Insulin drip infusing at 1.8 units/hr. Acute dialysis began per orders via left IJ tunneled catheter.

## 2018-04-16 NOTE — PLAN OF CARE
Problem: Physical Therapy Goal  Goal: Physical Therapy Goal  Goals to be met by: 18     Patient will increase functional independence with mobility by performin. Supine to sit with Moderate Assistance.  2. Sit to supine with Moderate Assistance.  3. Rolling to Left and Right with Minimal Assistance.   4. Sit to stand transfer with Moderate Assistance.  5. Bed to chair transfer with Maximum Assistance.  6. Gait  x 5 feet with Minimal Assistance.   7.  Pt to perf and be compliant with LE HEP to improve vascularity and mm strength.        Outcome: Ongoing (interventions implemented as appropriate)  Pt progressing towards goals.

## 2018-04-16 NOTE — PT/OT/SLP PROGRESS
"Occupational Therapy   Treatment    Name: Lv Crocker  MRN: [id number]  Admitting Diagnosis:  Acute respiratory failure with hypoxia  12 Days Post-Op    Recommendations:     Discharge Recommendations: LTACH (long term acute care hospital)  Discharge Equipment Recommendations:   (TBD)  Barriers to discharge:  Decreased caregiver support, Inaccessible home environment    Subjective     Communicated with: patient prior to session.  Pain/Comfort:  · Pain Rating 1: 10/10  · Location - Side 1: Bilateral  · Location - Orientation 1: generalized  · Location 1:  ("all over")  · Pain Addressed 1: Reposition, Distraction  · Pain Rating Post-Intervention 1: 10/10    Patients cultural, spiritual, Baptism conflicts given the current situation: none reported    Objective:     Patient found with: tracheostomy, oxygen, central line, peripheral IV, PEG Tube    General Precautions: Standard, fall   Orthopedic Precautions:N/A   Braces: N/A     Occupational Performance:    Bed Mobility:    · Patient completed Scooting/Bridging with total assistance  · Patient completed Supine to Sit with total assistance  · Patient completed Sit to Supine with total assistance     Functional Mobility/Transfers:  · Patient completed Bed <> Chair Transfer using Squat Pivot technique with total assistance and of 2 persons with no assistive device    Activities of Daily Living:  · Feeding:  total assistance    · Grooming: total assistance    · UB Dressing: total assistance Donning back gown  · LB Dressing: total assistance      Patient left up in chair with nurse notified, wife present and all needs met.     [facility] 6 Click:  [facility] Total Score: 6    Treatment & Education:  B UE PROM exercises in all available planes and joint including wrists and digits focusing facilitate normal movement and promote natural tone.  Education:    Assessment:     Lv Crocker is a 42 y.o. male with a medical diagnosis of Acute respiratory failure with " hypoxia.  Performance deficits affecting function are weakness, impaired endurance, impaired self care skills, impaired functional mobilty, gait instability, edema, impaired skin, decreased ROM, decreased lower extremity function, decreased upper extremity function, decreased coordination, impaired balance.      Rehab Prognosis:  fair; patient would benefit from acute skilled OT services to address these deficits and reach maximum level of function.       Plan:     Patient to be seen 5 x/week to address the above listed problems via self-care/home management, therapeutic activities, therapeutic exercises  · Plan of Care Expires: 05/06/18  · Plan of Care Reviewed with: patient, spouse    This Plan of care has been discussed with the patient who was involved in its development and understands and is in agreement with the identified goals and treatment plan    GOALS:    Occupational Therapy Goals        Problem: Occupational Therapy Goal    Goal Priority Disciplines Outcome Interventions   Occupational Therapy Goal     OT, PT/OT Ongoing (interventions implemented as appropriate)    Description:  Goals to be met by: 4/23/2018    Pt will follow 75% of motor commands with <2 verbal cues for repetition of task to maximize engagement in grooming task.--MET  Pt will complete feeding with mod A.   Pt will complete supine with HOB slightly elevated to seated at EOB with mod A in prep for seated grooming task.  Pt will engage in BUE exercises in orders to increase strength to 3+/5 in BUE's to increase engagement in seated grooming task  Pt will sit at EOB for approx 10 minutes with mod A and unilateral UE support to complete grooming task  Pt will complete sit>stand from EOB with bed in lowest position with mod A in prep [place] [facility]                      Time Tracking:     OT Date of Treatment: 04/16/18  OT Start Time: 1048  OT Stop Time: 1132  OT Total Time (min): 44 min    Billable Minutes:  Therapeutic Activity  34  Therapeutic Exercise 10     AMADOU Coon  4/16/2018

## 2018-04-16 NOTE — PROGRESS NOTES
[facility]  Nephrology  Progress Note    Patient Name: Lv Crocker  MRN: [id number]  Admission Date: 3/11/2018  Hospital Length of Stay: 34 days  Attending Provider: Kanika Ferreira MD   Primary Care Physician: Philippe Mohr MD  Principal Problem:Acute respiratory failure with hypoxia    Subjective:     HPI: Mr. Crocker is a 43 yo WM with T1DM, Hashimoto thyroiditis, h/o OHTx 11/2014, and CKD stage 4 who was admitted on 3/11/18 for persistent diarrhea. He reported a 3 week history of diarrhea up to 15x/day. Associated symptoms including URI symptoms, coughing fits, and coughing syncope. Prograf level was 14.3. His post-heart transplant course has been complicated by AMR 4/2015, CMV, post-transplant restrictive cardiomyopathy, recurrent pleural effusions/ascites requiring monthly thoracenteses/paracenteses, VATS 1/11/18 with Pleur-X catheter (now removed), and CKD stage 4 with baseline sCr 2.6-3.0. Baseline -120s and baseline BP 90s-110s/60-70s. Upon admission he was started on IVF and diarrhea workup was initiated. On 3/12 he was noted to have decreased UOP despite fluids; NS was increased to 100cc/hr. He was scheduled for a colonoscopy on 3/13 however procedure was cancelled due to hypoxia and fever. He was transferred to the ICU for closer monitoring. He continued to have oliguria overnight. Bladder scan revealed 200cc of urine but patient refused osullivan catheter placement. Wife reports that patient always has a hard time urinating again after osullivan catheters are placed/removed so he refuses them. He remained hypoxic despite FiO2 70% and ABG revealed combined respiratory and metabolic acidosis with pH 7.17. He was started on BiPAP as well as a bicarb infusion at 50cc/hr. ABG with mild improvement. AM labs revealed K 6.2 and he was shifted and given kayexalate.Trialysis catheter was placed this morning and he subsequently developed hypotension and was started on levophed. He was  intubated later this morning. Nephrology consulted for CACHORRO. All history obtained by primary team and chart review as patient was intubated/sedated on exam. Consent for dialysis obtained by patient's wife and placed in chart. Of note, both of patient's parents were on dialysis.     Interval History:   NAEON. Mild hyperkalemia this morning again 5.8, otherwise, electrolytes stable. FK-506 level was 2.8. Off RRT overnight. Remains anuric, net gain balance 1.3 L/24h.  He feels better today.     Review of patient's allergies indicates:   Allergen Reactions    No known drug allergies      Current Facility-Administered Medications   Medication Frequency    0.9%  NaCl infusion (for blood administration) Q24H PRN    0.9%  NaCl infusion (for blood administration) Q24H PRN    0.9%  NaCl infusion PRN    alprazolam ODT dissolvable tablet 0.5 mg TID PRN    chlorhexidine 0.12 % solution 15 mL BID    epoetin jose miguel injection 4,000 Units Every Mon, Wed, Fri    escitalopram oxalate tablet 20 mg Daily    famotidine tablet 20 mg QHS    heparin (porcine) injection 1,000 Units PRN    heparin (porcine) injection 5,000 Units Q12H    insulin aspart U-100 pen 0-4 Units Q4H PRN    insulin regular (Humulin R) 100 Units in sodium chloride 0.9% 100 mL infusion Continuous    [START ON 4/16/2018] levothyroxine tablet 150 mcg Daily    midodrine tablet 10 mg PRN    midodrine tablet 2.5 mg TID    nitroGLYCERIN 2% TD oint ointment 0.5 inch Q12H    nortriptyline capsule 10 mg QHS    ondansetron disintegrating tablet 4 mg Q6H PRN    oxyCODONE 5 mg/5 mL solution 10 mg Q12H    polyethylene glycol packet 17 g Daily    povidone-iodine 10 % ointment PRN    predniSONE tablet 7.5 mg Daily    promethazine (PHENERGAN) 6.25 mg in dextrose 5 % 50 mL IVPB Q6H PRN    sennosides 8.8 mg/5 ml syrup 5 mL QHS    tacrolimus (PROGRAF) 1 mg/mL oral syringe Daily    [START ON 4/16/2018] tacrolimus (PROGRAF) 1 mg/mL oral syringe Daily       Objective:      Vital Signs (Most Recent):  Temp: 98.1 °F (36.7 °C) (04/15/18 1712)  Pulse: (!) 121 (04/15/18 1712)  Resp: 16 (04/15/18 1712)  BP: (!) 109/59 (04/15/18 1712)  SpO2: 99 % (04/15/18 1712)  O2 Device (Oxygen Therapy): tracheostomy collar (04/15/18 1645) Vital Signs (24h Range):  Temp:  [98.1 °F (36.7 °C)-98.6 °F (37 °C)] 98.1 °F (36.7 °C)  Pulse:  [108-121] 121  Resp:  [14-18] 16  SpO2:  [98 %-99 %] 99 %  BP: ()/(59-80) 109/59     Weight: 65 kg (143 lb 4.8 oz) (04/15/18 0445)  Body mass index is 22.44 kg/m².  Body surface area is 1.75 meters squared.    I/O last 3 completed shifts:  In: 1879.7 [I.V.:32.2; Other:600; NG/GT:1197.5; IV Piggyback:50]  Out: 1600 [Other:1600]    Physical Exam   Constitutional: He appears well-developed and well-nourished. No distress.   HENT:   Head: Normocephalic and atraumatic.   Eyes: Conjunctivae and EOM are normal.   Cardiovascular: Regular rhythm.  Tachycardia present.    Pulmonary/Chest: He has no wheezes. He has no rales.   Abdominal: Soft. He exhibits no distension.   Musculoskeletal: He exhibits edema. He exhibits no tenderness or deformity.   Neurological: He is alert.   Skin: Skin is warm and dry. He is not diaphoretic.       Significant Labs:  CBC:     Recent Labs  Lab 04/15/18  0743   WBC 5.90   RBC 2.89*   HGB 8.9*   HCT 29.3*      *   MCH 30.8   MCHC 30.4*     CMP:     Recent Labs  Lab 04/15/18  0743   *   CALCIUM 9.8   ALBUMIN 2.2*   PROT 6.1      K 5.8*   CO2 27      BUN 62*   CREATININE 3.6*   ALKPHOS 191*   ALT 11   AST 15   BILITOT 0.6          Assessment/Plan:     Acute renal failure superimposed on stage 4 chronic kidney disease    - baseline sCr 2.6-3.0 consistent with CKD stage IV  - anuric CACHORRO; likely ischemic ATN from prolonged pre-renal state (diarrhea) in setting of prograf renal vasoconstriction; cannot r/o septic ATN or Prograf toxicity  - SLED started 3/14. TDC placed 4/4/18.   - remains anuric  - Blood pressure have  been stable today   -Tolerated HD treatment well on 04/12 with 1L net fluid removal.    Plan:  Will do HD today x 2.5 hrs for matabolic clearance and volume management  UF goal of 1L  Midodrine 10 mg PO 30 minutes prior to HD to prevent IDH zelalem plan for HD in am  Procrit with HD with next HD  Albumin 25 g x 1 PRN during HD treatment.  Recommend changing tube feeds to Nepro, as this is the likely cause of his hyperkalemia. He is still on Glucerna and suspect this maybe elevating his K            Thank you for your consult. I will follow-up with patient. Please contact us if you have any additional questions.    Eulalio Mariee MD  Nephrology  [facility]        I have reviewed and concur with the fellow's history, physical, assessment, and plan. I have personally interviewed and examined the patient at bedside.

## 2018-04-16 NOTE — PT/OT/SLP PROGRESS
"Physical Therapy Treatment    Patient Name:  Lv Crocker   MRN:  [id number]    Recommendations:     Discharge Recommendations:  LTACH (long term acute care hospital)   Discharge Equipment Recommendations: none   Barriers to discharge: Inaccessible home and Decreased caregiver support    Assessment:     Lv Crocker is a 42 y.o. male admitted with a medical diagnosis of Acute respiratory failure with hypoxia.  He presents with the following impairments/functional limitations:  weakness, impaired endurance, impaired sensation, impaired self care skills, impaired functional mobilty, gait instability, impaired balance, decreased upper extremity function, decreased lower extremity function, decreased safety awareness, pain, impaired coordination, decreased ROM, impaired fine motor, impaired skin, impaired cardiopulmonary response to activity, impaired joint extensibility.  Pt is significantly impaired following prolonged bed rest and medical complications that are now causing mm atrophy, impaired jt mobility and decreased AROM.  Pt requires max-total A with all mobility.  Unable to elevate B UEs against gravity and in gravity reduced positions.  Pt was able to get out of the bed for the first time in approx 1 month, tolerated sitting in recliner chair for 2 hours.  Further inhibited by pain, which pt states is "all over" his body.  HEP established with pt and handouts provided.  Pt will require skilled PT services in the LTACH setting upon discharge.  During hospitalization, pt will require acute skilled PT services to address these deficits and reach maximum level of function.      Recent Surgery: Procedure(s) (LRB):  INSERTION-CATHETER-PERM-A-CATH (Left)  INSERTION-TUBE-GASTROSTOMY (N/A) 12 Days Post-Op    Plan:     During this hospitalization, patient to be seen 5 x/week to address the above listed problems via gait training, therapeutic activities, therapeutic exercises, neuromuscular " "re-education  · Plan of Care Expires:  05/06/18   Plan of Care Reviewed with: patient, spouse    Subjective     Communicated with nursing prior to session.  Patient found in 1/4 from supine towards L SL upon PT entry to room, agreeable to treatment.      "All over."    Chief Complaint: generalized pain throughout  Patient comments/goals: To improve independent mobility, to perform tasks such as scratching an itch  Pain/Comfort:  · Pain Rating 1: 10/10  · Location - Orientation 1: generalized  · Pain Addressed 1: Nurse notified, Pre-medicate for activity, Reposition, Distraction, Cessation of Activity    Patients cultural, spiritual, Mu-ism conflicts given the current situation: no    Objective:     Patient found with: tracheostomy, telemetry, peripheral IV, oxygen, PEG Tube, central line, pressure relief boots     General Precautions: Standard, fall, respiratory, contact, diabetic, aspiration, NPO   Orthopedic Precautions:N/A   Braces: N/A     Functional Mobility:    · Bed Mobility:     · Rolling Left:  maximal assistance  · Rolling Right: maximal assistance  · Scooting: total assistance and of 2 persons  · Supine to Sit: total assistance, with mm activation and max verbal cuing for LE movement towards EOB, pt able to perf minimal movement with maxA intermittently  · Sit to Supine: total assistance and of 2 persons    · Transfers:     · Sit to Stand:  total assistance with no AD  · Bed to Chair: total assistance with  no AD  using  Squat Pivot    · Gait: No stepping performed during pivot transfer    · Balance: Mod: static sitting balance unsupported EOB; TotalA: static and dynamic standing balance without AD      AM-PAC 6 CLICK MOBILITY  Turning over in bed (including adjusting bedclothes, sheets and blankets)?: 2  Sitting down on and standing up from a chair with arms (e.g., wheelchair, bedside commode, etc.): 2  Moving from lying on back to sitting on the side of the bed?: 2  Moving to and from a bed to a " chair (including a wheelchair)?: 2  Need to walk in hospital room?: 1  Climbing 3-5 steps with a railing?: 1  Total Score: 10       Therapeutic Activities and Exercises:   Whiteboard updated  Ankle pumps: 20 reps, in sit   LAQs: 10 reps each LE perf individually, in sit, with modA to facilitate full kn ext  Hip abd/add: 20 reps, perf B, in sit  Cat/camels: 10 reps, with verbal cuing and demo to perf, with neck flex/ext, minimal trunk mm activation  Trunk rotation: 20 reps, perf B, with modA, neck rotation  Elbow flex/ext: PROM  Finger flex/ext: PROM    Patient left in low recliner chair and then 2 hours later in L SL propped with pillows with all lines intact, call button in reach, nursing notified and spouse present.    GOALS:    Physical Therapy Goals        Problem: Physical Therapy Goal    Goal Priority Disciplines Outcome Goal Variances Interventions   Physical Therapy Goal     PT/OT, PT Ongoing (interventions implemented as appropriate)     Description:  Goals to be met by: 18     Patient will increase functional independence with mobility by performin. Supine to sit with Moderate Assistance.  2. Sit to supine with Moderate Assistance.  3. Rolling to Left and Right with Minimal Assistance.   4. Sit to stand transfer with Moderate Assistance.  5. Bed to chair transfer with Maximum Assistance.  6. Gait  x 5 feet with Minimal Assistance.   7.  Pt to perf and be compliant with LE HEP to improve vascularity and mm strength.                          Time Tracking:     PT Received On: 18  PT Start Time: 1133     PT Stop Time: 1231  PT Total Time (min): 58 min     Billable Minutes: Therapeutic Activity 30, Therapeutic Exercise 15 and Neuromuscular Re-education 13    Treatment Type: Treatment  PT/PTA: PT     PTA Visit Number: 0     Deepthi Xiao, PT  2018

## 2018-04-16 NOTE — PLAN OF CARE
Problem: Patient Care Overview  Goal: Plan of Care Review  Outcome: Ongoing (interventions implemented as appropriate)  No acute events. HD yesterday with 1 L off. Tachycardic on telemetry, VSS otherwise. Trach collar, #6 shiley, 5 L, 28%. -192, ss coverage provided. Insulin gtt remains at 1.8 unit/hr. NPO with meds per PEG and tolerating TF at 50 ml/hr, residual 20 ml. HOB maintained > 30 degrees at all times. Nitro paste applied to affected toes on right foot. DTI to left heel dressing CDI. C/o pain to bilateral feet resolved after scheduled oxycodone. Fall precautions maintained. Bed wheels locked, bed in lowest position, upper SR up x 2, call light in reach, non-skid socks when OOB. Instructed pt to call for assistance as needed. POC discussed with pt and spouse. Will cont to monitor.

## 2018-04-16 NOTE — SUBJECTIVE & OBJECTIVE
Interval History: No acute issues overnight. Some confusions and hallucinating this AM per wife, easily reoriented. Plan for LTACH in the coming week once all acute issues have resolved.     Continuous Infusions:   insulin (HUMAN R) infusion (adults) 1.8 Units/hr (04/16/18 0102)     Scheduled Meds:   sodium chloride 0.9%   Intravenous Once    chlorhexidine  15 mL Mouth/Throat BID    epoetin jose miguel (PROCRIT) injection  4,000 Units Intravenous Every Mon, Wed, Fri    escitalopram oxalate  20 mg Per G Tube Daily    famotidine  20 mg Per G Tube QHS    heparin (porcine)  5,000 Units Subcutaneous Q12H    levothyroxine  150 mcg Per G Tube Daily    midodrine  2.5 mg Oral TID    nitroGLYCERIN 2% TD oint  0.5 inch Topical (Top) Q12H    nortriptyline  10 mg Per OG tube QHS    polyethylene glycol  17 g Oral Daily    predniSONE  7.5 mg Per G Tube Daily    sennosides 8.8 mg/5 ml  5 mL Oral QHS    tacrolimus  2 mg Sublingual BID     PRN Meds:sodium chloride, sodium chloride, sodium chloride 0.9%, sodium chloride 0.9%, alprazolam ODT, heparin (porcine), hydrOXYzine HCl, insulin aspart U-100, midodrine, ondansetron, oxyCODONE, povidone-iodine, promethazine (PHENERGAN) IVPB    Review of patient's allergies indicates:   Allergen Reactions    No known drug allergies      Objective:     Vital Signs (Most Recent):  Temp: 98.2 °F (36.8 °C) (04/16/18 1145)  Pulse: (!) 113 (04/16/18 1145)  Resp: 17 (04/16/18 1145)  BP: (!) 95/52 (04/16/18 1145)  SpO2: 99 % (04/16/18 1145) Vital Signs (24h Range):  Temp:  [97.9 °F (36.6 °C)-99.4 °F (37.4 °C)] 98.2 °F (36.8 °C)  Pulse:  [112-121] 113  Resp:  [16-20] 17  SpO2:  [96 %-99 %] 99 %  BP: ()/(52-80) 95/52     Patient Vitals for the past 72 hrs (Last 3 readings):   Weight   04/16/18 0330 66.9 kg (147 lb 7.8 oz)   04/15/18 0445 65 kg (143 lb 4.8 oz)   04/14/18 0445 63.5 kg (139 lb 15.9 oz)     Body mass index is 23.1 kg/m².      Intake/Output Summary (Last 24 hours) at 04/16/18  1151  Last data filed at 04/16/18 0330   Gross per 24 hour   Intake           1224.7 ml   Output             1600 ml   Net           -375.3 ml     Physical Exam   Constitutional: He appears well-developed and well-nourished. No distress.   HENT:   Head: Normocephalic and atraumatic.   Eyes: Conjunctivae and EOM are normal.   Cardiovascular: Regular rhythm.  Tachycardia present.    Pulmonary/Chest: He has no wheezes. He has no rales.   Abdominal: Soft. He exhibits no distension.   Musculoskeletal: He exhibits edema. He exhibits no tenderness or deformity.   Neurological: He is alert.   Skin: Skin is warm and dry. He is not diaphoretic.     Significant Labs:  CBC:    Recent Labs  Lab 04/14/18  0504 04/15/18  0743 04/16/18  0824   WBC 7.03 5.90 4.35   RBC 2.91* 2.89* 2.71*   HGB 9.1* 8.9* 8.4*   HCT 29.4* 29.3* 27.7*    191 181   * 101* 102*   MCH 31.3* 30.8 31.0   MCHC 31.0* 30.4* 30.3*     CMP:    Recent Labs  Lab 04/14/18  0504 04/15/18  0743 04/16/18  0824   * 260* 187*   CALCIUM 9.4 9.8 9.6   ALBUMIN 2.6* 2.2* 2.2*   PROT 6.5 6.1 6.3   * 136 138   K 5.9* 5.8* 5.8*   CO2 20* 27 28    101 103   BUN 38* 62* 49*   CREATININE 2.7* 3.6* 2.8*   ALKPHOS 201* 191* 207*   ALT 15 11 13   AST 27 15 18   BILITOT 0.8 0.6 0.6      I have reviewed all pertinent labs within the past 24 hours.    Estimated Creatinine Clearance: 31.5 mL/min (A) (based on SCr of 2.8 mg/dL (H)).    Diagnostic Results:  I have reviewed and interpreted all pertinent imaging results/findings within the past 24 hours.

## 2018-04-16 NOTE — SUBJECTIVE & OBJECTIVE
"Interval HPI:   Overnight events: none  Eating:   NPO  Nausea: No  Hypoglycemia and intervention: No  Fever: No  TPN and/or TF: Yes  If yes, type of TF/TPN and rate: 50cc/hr    /75 (BP Location: Right arm, Patient Position: Lying)   Pulse (!) 113   Temp 99.4 °F (37.4 °C) (Oral)   Resp 20   Ht 5' 7" (1.702 m)   Wt 66.9 kg (147 lb 7.8 oz)   SpO2 98%   BMI 23.10 kg/m²     Labs Reviewed and Include      Recent Labs  Lab 04/16/18  0824   *   CALCIUM 9.6   ALBUMIN 2.2*   PROT 6.3      K 5.8*   CO2 28      BUN 49*   CREATININE 2.8*   ALKPHOS 207*   ALT 13   AST 18   BILITOT 0.6     Lab Results   Component Value Date    WBC 4.35 04/16/2018    HGB 8.4 (L) 04/16/2018    HCT 27.7 (L) 04/16/2018     (H) 04/16/2018     04/16/2018       Recent Labs  Lab 04/15/18  0743   TSH 12.752*   FREET4 0.94     Lab Results   Component Value Date    HGBA1C 5.1 04/05/2018       Nutritional status:   Body mass index is 23.1 kg/m².  Lab Results   Component Value Date    ALBUMIN 2.2 (L) 04/16/2018    ALBUMIN 2.2 (L) 04/15/2018    ALBUMIN 2.6 (L) 04/14/2018     Lab Results   Component Value Date    PREALBUMIN 13 (L) 04/08/2018    PREALBUMIN 5 (L) 03/15/2018    PREALBUMIN 18 (L) 03/24/2015       Estimated Creatinine Clearance: 31.5 mL/min (A) (based on SCr of 2.8 mg/dL (H)).    Accu-Checks  Recent Labs      04/15/18   0108  04/15/18   0454  04/15/18   0851  04/15/18   1424  04/15/18   1724  04/15/18   2057  04/16/18   0058  04/16/18   0332  04/16/18   0725  04/16/18   0929   POCTGLUCOSE  278*  318*  257*  223*  223*  192*  181*  194*  204*  171*       Current Medications and/or Treatments Impacting Glycemic Control  Immunotherapy:  Immunosuppressants         Stop Route Frequency     tacrolimus capsule 2 mg      -- SL 2 times daily        Steroids:   Hormones     Start     Stop Route Frequency Ordered    04/06/18 0900  predniSONE tablet 7.5 mg      -- PER G TUBE Daily 04/05/18 0926        Pressors:  "   Autonomic Drugs     Start     Stop Route Frequency Ordered    04/13/18 0859  midodrine tablet 10 mg      -- Oral As needed (PRN) 04/13/18 0800    04/09/18 1500  midodrine tablet 2.5 mg      -- Oral 3 times daily 04/09/18 1006        Hyperglycemia/Diabetes Medications: Antihyperglycemics     Start     Stop Route Frequency Ordered    04/11/18 0015  insulin regular (Humulin R) 100 Units in sodium chloride 0.9% 100 mL infusion     Question:  Insulin Rate Adjustment (DO NOT MODIFY ANSWER)  Answer:  [email or web address]    -- IV Continuous 04/10/18 2313    04/10/18 2221  insulin aspart U-100 pen 0-4 Units      -- SubQ Every 4 hours PRN 04/10/18 2122

## 2018-04-16 NOTE — NURSING
Rounds Report: Attended interdisciplinary rounds this morning with the transplant team including SW, physicians, fellows,  mid-level providers, and transplant coordinators.  Discussed plan of care which includes trach and peg care and dialysis. Still waiting on placement to long term care facility.

## 2018-04-16 NOTE — ASSESSMENT & PLAN NOTE
BG goal 140-180     continue transition gtt to 1.8 u/hr - plan to switch to subcutaneous insulin soon prior to d/c  bg monitoring q4hrs     Pt is T1DM, do not suspend insulin >1 hr   If TF held, decrease insulin rate to 0.2u/hr - known to have controlled BG on basal needs of lev 5 daily/ 0.2u/hr during this hospitalization        Discharge Recommendations:  TBD.

## 2018-04-16 NOTE — PLAN OF CARE
Problem: Patient Care Overview  Goal: Plan of Care Review  Outcome: Ongoing (interventions implemented as appropriate)  Pt experiencing visual disturbances at the beginning of the shift. Pt states he is seeing people and asking if anyone else sees them.  Pt is redirected easily.  Pt pain medication reduced to see if this is causing these issues. Pt vital signs remain stable throughout shift. Pt seen by therapy.  Pt sat up in bedside chair for approximately 2 hours.  Pt c/o pain and was placed back in bed on left side to allow turning to prevent skin breakdown. Pt tolerated well.  Pt went to dialysis, staff at side as well as wife. Will continue to monitor when he returns to his room.

## 2018-04-16 NOTE — PROGRESS NOTES
"[facility]  Endocrinology  Progress Note    Admit Date: 3/11/2018     Reason for Consult: abnormal TFTs    Surgical Procedure and Date: s/p heart transplant 2014     Diabetes diagnosis year: 7yo (T1DM)     Home Diabetes Medications:    Levemir 15u qHS  Novolog 4u AC     How often checking glucose at home? 4 times daily   BG readings on regimen: 150-200s  Hypoglycemia on the regimen?  Yes, rarely - treats appropriately (light snack, glucose tab)  Missed doses on regimen?  No        Diabetes Complications include:     Hyperglycemia, Hypoglycemia  and Diabetic chronic kidney disease          Complicating diabetes co morbidities:   N/A     HPI:   Patient is a 42 y.o. male with a diagnosis of T1DM, HTN, hypothyroidism, s/p heart transplant.  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 He required serial paracentesis and thoracentesis until he underwent a VATS with pleurX catheter placement on 1/11/2018 and removed 2/7/18.       Presented to hospital secondary to diarrhea and cough.  Upon initial labs, TSH was decreased (0.101) and free T4 1.33.  Endocrinology consulted to assist in management of these labs.  He was last seen in clinic by Kamala Vázquez NP 11/2017.   Previous hospitalization, endocrine consulted for same problem.  During that visit, recommended decreasing LT4 to 125mcg daily. Unfortunately, patient was discharged on previous dose of 150mcg daily.     Interval HPI:   Overnight events: none  Eating:   NPO  Nausea: No  Hypoglycemia and intervention: No  Fever: No  TPN and/or TF: Yes  If yes, type of TF/TPN and rate: 50cc/hr    /75 (BP Location: Right arm, Patient Position: Lying)   Pulse (!) 113   Temp 99.4 °F (37.4 °C) (Oral)   Resp 20   Ht 5' 7" (1.702 m)   Wt 66.9 kg (147 lb 7.8 oz)   SpO2 98%   BMI 23.10 kg/m²       Labs Reviewed and Include      Recent Labs  Lab " 04/16/18  0824   *   CALCIUM 9.6   ALBUMIN 2.2*   PROT 6.3      K 5.8*   CO2 28      BUN 49*   CREATININE 2.8*   ALKPHOS 207*   ALT 13   AST 18   BILITOT 0.6     Lab Results   Component Value Date    WBC 4.35 04/16/2018    HGB 8.4 (L) 04/16/2018    HCT 27.7 (L) 04/16/2018     (H) 04/16/2018     04/16/2018       Recent Labs  Lab 04/15/18  0743   TSH 12.752*   FREET4 0.94     Lab Results   Component Value Date    HGBA1C 5.1 04/05/2018       Nutritional status:   Body mass index is 23.1 kg/m².  Lab Results   Component Value Date    ALBUMIN 2.2 (L) 04/16/2018    ALBUMIN 2.2 (L) 04/15/2018    ALBUMIN 2.6 (L) 04/14/2018     Lab Results   Component Value Date    PREALBUMIN 13 (L) 04/08/2018    PREALBUMIN 5 (L) 03/15/2018    PREALBUMIN 18 (L) 03/24/2015       Estimated Creatinine Clearance: 31.5 mL/min (A) (based on SCr of 2.8 mg/dL (H)).    Accu-Checks  Recent Labs      04/15/18   0108  04/15/18   0454  04/15/18   0851  04/15/18   1424  04/15/18   1724  04/15/18   2057  04/16/18   0058  04/16/18   0332  04/16/18   0725  04/16/18   0929   POCTGLUCOSE  278*  318*  257*  223*  223*  192*  181*  194*  204*  171*       Current Medications and/or Treatments Impacting Glycemic Control  Immunotherapy:  Immunosuppressants         Stop Route Frequency     tacrolimus capsule 2 mg      -- SL 2 times daily        Steroids:   Hormones     Start     Stop Route Frequency Ordered    04/06/18 0900  predniSONE tablet 7.5 mg      -- PER G TUBE Daily 04/05/18 0926        Pressors:    Autonomic Drugs     Start     Stop Route Frequency Ordered    04/13/18 0859  midodrine tablet 10 mg      -- Oral As needed (PRN) 04/13/18 0800    04/09/18 1500  midodrine tablet 2.5 mg      -- Oral 3 times daily 04/09/18 1006        Hyperglycemia/Diabetes Medications: Antihyperglycemics     Start     Stop Route Frequency Ordered    04/11/18 0015  insulin regular (Humulin R) 100 Units in sodium chloride 0.9% 100 mL infusion      Question:  Insulin Rate Adjustment (DO NOT MODIFY ANSWER)  Answer:  [email or web address]    -- IV Continuous 04/10/18 2313    04/10/18 2221  insulin aspart U-100 pen 0-4 Units      -- SubQ Every 4 hours PRN 04/10/18 2122          ASSESSMENT and PLAN    * Acute respiratory failure with hypoxia    Per primary  S/p trach.  Practicing with speech valve.  Remains NPO        Type 1 diabetes mellitus with stage 3 chronic kidney disease    BG goal 140-180     continue transition gtt to 1.8 u/hr - plan to switch to subcutaneous insulin soon prior to d/c  bg monitoring q4hrs     Pt is T1DM, do not suspend insulin >1 hr   If TF held, decrease insulin rate to 0.2u/hr - known to have controlled BG on basal needs of lev 5 daily/ 0.2u/hr during this hospitalization        Discharge Recommendations:  TBD.        Hypothyroidism due to Hashimoto's thyroiditis    Switch to iv levothyroxine 75mcg daily  Recheck tft's late week          On enteral nutrition    TF at goal.  May increase insulin needs        Current chronic use of systemic steroids    On PDN 7.5mg daily  Can increase insulin requirement        CKD (chronic kidney disease), stage IV    Titrate cautiously.  Caution with insulin stacking.  Avoid hypoglycemia.        Heart transplanted    Per transplant  Avoid hypoglycemia  On PDN and prograf - could lead to prandial elevations and insulin resistance.            Ankur Keith MD  Endocrinology  [facility]

## 2018-04-16 NOTE — PROGRESS NOTES
Pt escorted to dialysis via his own bed.  Pt tube feed going at 50ml/hr as well has his insulin drip at 1.8 units/hr.  NADN.  Dialysis staff will continue to monitor.

## 2018-04-16 NOTE — PROGRESS NOTES
I personally saw and examined the patient on hemodialysis, tolerating treatment, see hemodyalisis flowsheet. I also reviewed the chart and current medication. The dialysis bath was adjusted. UF 1 liter

## 2018-04-16 NOTE — ASSESSMENT & PLAN NOTE
- PT/OT daily.   - Expect long road to functional recovery.   - PEG tube placed and tolerating feeds.   - Nutrition following.    - switched from glucerna to novasource renal (start at 10 and increase to goal of 40 as tolerated)

## 2018-04-16 NOTE — PROGRESS NOTES
Spoke with lab who states am labs have not been received. Spoke with venipuncture supervisor who confirms labs were not drawn. Orders reentered stat.

## 2018-04-16 NOTE — ASSESSMENT & PLAN NOTE
- baseline sCr 2.6-3.0 consistent with CKD stage IV  - anuric CACHORRO; likely ischemic ATN from prolonged pre-renal state (diarrhea) in setting of prograf renal vasoconstriction; cannot r/o septic ATN or Prograf toxicity  - SLED started 3/14. TDC placed 4/4/18.   - remains anuric  - Blood pressure have been stable today   -Tolerated HD treatment well on 04/12 with 1L net fluid removal.    Plan:  Will do HD today x 2.5 hrs for matabolic clearance and volume management  UF goal of 1L  Midodrine 10 mg PO 30 minutes prior to HD to prevent IDH zelalem plan for HD in am  Procrit with HD with next HD  Albumin 25 g x 1 PRN during HD treatment.  Recommend changing tube feeds to Nepro, as this is the likely cause of his hyperkalemia. He is still on Glucerna and suspect this maybe elevating his K

## 2018-04-16 NOTE — ASSESSMENT & PLAN NOTE
- Appreciate Endocrine's recs  - Continue Levothyroxine 150mcg per NG tube (adjusted over the weekend)

## 2018-04-16 NOTE — PROGRESS NOTES
Pt back to TSU from dialysis by RN escort.  Pt tolerated well.  Pt vital signs stable.  Pt tube feeds changed to Novasource per orders and restarted at 10ml/hr. Endocrine notified of change of tube feeds and rate.  Stated to keep insulin drip the same and to watch patient.

## 2018-04-16 NOTE — PROGRESS NOTES
[facility]  Heart Transplant  Progress Note    Patient Name: Lv Crocker  MRN: [id number]  Admission Date: 3/11/2018  Hospital Length of Stay: 35 days  Attending Physician: Kanika Ferreira MD  Primary Care Provider: Philippe Mohr MD  Principal Problem:Acute respiratory failure with hypoxia    Subjective:     Interval History: No acute issues overnight. Some confusions and hallucinating this AM per wife, easily reoriented. Plan for LTACH in the coming week once all acute issues have resolved.   Had question regarding whether or not patient would be a kidney transplant candidate.     Continuous Infusions:   insulin (HUMAN R) infusion (adults) 1.8 Units/hr (04/16/18 0102)     Scheduled Meds:   sodium chloride 0.9%   Intravenous Once    chlorhexidine  15 mL Mouth/Throat BID    epoetin jose miguel (PROCRIT) injection  4,000 Units Intravenous Every Mon, Wed, Fri    escitalopram oxalate  20 mg Per G Tube Daily    famotidine  20 mg Per G Tube QHS    heparin (porcine)  5,000 Units Subcutaneous Q12H    levothyroxine  150 mcg Per G Tube Daily    midodrine  2.5 mg Oral TID    nitroGLYCERIN 2% TD oint  0.5 inch Topical (Top) Q12H    nortriptyline  10 mg Per OG tube QHS    polyethylene glycol  17 g Oral Daily    predniSONE  7.5 mg Per G Tube Daily    sennosides 8.8 mg/5 ml  5 mL Oral QHS    tacrolimus  2 mg Sublingual BID     PRN Meds:sodium chloride, sodium chloride, sodium chloride 0.9%, sodium chloride 0.9%, alprazolam ODT, heparin (porcine), hydrOXYzine HCl, insulin aspart U-100, midodrine, ondansetron, oxyCODONE, povidone-iodine, promethazine (PHENERGAN) IVPB    Review of patient's allergies indicates:   Allergen Reactions    No known drug allergies      Objective:     Vital Signs (Most Recent):  Temp: 98.2 °F (36.8 °C) (04/16/18 1145)  Pulse: (!) 113 (04/16/18 1145)  Resp: 17 (04/16/18 1145)  BP: (!) 95/52 (04/16/18 1145)  SpO2: 99 % (04/16/18 1145) Vital Signs (24h Range):  Temp:   [97.9 °F (36.6 °C)-99.4 °F (37.4 °C)] 98.2 °F (36.8 °C)  Pulse:  [112-121] 113  Resp:  [16-20] 17  SpO2:  [96 %-99 %] 99 %  BP: ()/(52-80) 95/52     Patient Vitals for the past 72 hrs (Last 3 readings):   Weight   04/16/18 0330 66.9 kg (147 lb 7.8 oz)   04/15/18 0445 65 kg (143 lb 4.8 oz)   04/14/18 0445 63.5 kg (139 lb 15.9 oz)     Body mass index is 23.1 kg/m².      Intake/Output Summary (Last 24 hours) at 04/16/18 1151  Last data filed at 04/16/18 0330   Gross per 24 hour   Intake           1224.7 ml   Output             1600 ml   Net           -375.3 ml     Physical Exam   Constitutional: He appears well-developed and well-nourished. No distress.   HENT:   Head: Normocephalic and atraumatic.   Eyes: Conjunctivae and EOM are normal.   Cardiovascular: Regular rhythm.  Tachycardia present.    Pulmonary/Chest: He has no wheezes. He has no rales.   Abdominal: Soft. He exhibits no distension.   Musculoskeletal: He exhibits edema. He exhibits no tenderness or deformity.   Neurological: He is alert.   Skin: Skin is warm and dry. He is not diaphoretic.     Significant Labs:  CBC:    Recent Labs  Lab 04/14/18  0504 04/15/18  0743 04/16/18  0824   WBC 7.03 5.90 4.35   RBC 2.91* 2.89* 2.71*   HGB 9.1* 8.9* 8.4*   HCT 29.4* 29.3* 27.7*    191 181   * 101* 102*   MCH 31.3* 30.8 31.0   MCHC 31.0* 30.4* 30.3*     CMP:    Recent Labs  Lab 04/14/18  0504 04/15/18  0743 04/16/18  0824   * 260* 187*   CALCIUM 9.4 9.8 9.6   ALBUMIN 2.6* 2.2* 2.2*   PROT 6.5 6.1 6.3   * 136 138   K 5.9* 5.8* 5.8*   CO2 20* 27 28    101 103   BUN 38* 62* 49*   CREATININE 2.7* 3.6* 2.8*   ALKPHOS 201* 191* 207*   ALT 15 11 13   AST 27 15 18   BILITOT 0.8 0.6 0.6      I have reviewed all pertinent labs within the past 24 hours.    Estimated Creatinine Clearance: 31.5 mL/min (A) (based on SCr of 2.8 mg/dL (H)).    Diagnostic Results:  I have reviewed and interpreted all pertinent imaging results/findings within the  past 24 hours.    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 Most recently has had ongoing issues with his lungs, had gotten 2 thoracenteses, after which he underwent VATS 01/19/18 followed by pleurex catheter placement and effusion drainage 01/11/18, subsequently had it removed 02/07/18 after drainage had decreased despite multiple attempts to drain it. Has been having significant coughing fits that result in him being near-syncopal at times, although difficult to say if he has passed out or not- patient most recently was admitted to the hospital for increased AGUILAR, coughing and pre-syncope 02/11-02/14/18 at [facility] [place] [facility].   Patient was started on antibiotics for suspected bacterial infection, with some diarrhea, bronchitis, and possible pneumonia. Ct chest showed some pleural fluid collection and after talking with Dr. James, we arranged for him to receive a diagnostic tap with IR, from which the fluid collection demonstrated no growth or significant findings at all really. Cell counts do not appear to have been sent but will recheck. Patient states since his hospital stay, yesterday had a significant coughing fit again, after which he nearly passed out, is being seen in clinic today for this. Denies any cardiopulmonary complaints, no leg swelling, has some abdominal swelling, which is moderate for him. Denies significant shortness of breath with mild exertion, had 6MWT yesterday to see if he qualified for home O2, and his O2 sats actually improved after recovery from where he started initially. He and his wife admit he is in bed most days, not very active.     Mr. Crocker presented to the ED 3/11/18 with approximately 3 weeks of worsening diarrhea and 2-3 days of sinus pressure, drainage, and worsened cough.  He notes that  he had a coughing fit last night and passed out, coughed throughout most of the night.  This also happened on 2/25/18.  He last saw Dr Ferreira in clinic on 2/20/18 where he was taken off myfortic and switched to cellcept (he hadn't been on cellcept because of leukopenia when they tried post-transplant).  Though his diarrhea had improved after his last discharge, he states it has increased now to 15x/day, clear/yellow/green, no fevers, no exacerbating foods per say.  He was taken back off the cellcept and placed back on myfortic this past week and has not noticed immediate change in diarrhea. He also reports his baseline coughing fits havent improved and are minimally responsive to tessalon pearls.  Came on last night, he lost consciousness during a fit once which is relatively normal for him, and had two more during HPI.  He notes no sick contacts but does state he's had some URI symptoms as above.  His baseline vitals are usually -120 and BP 90s/60s-110s/70s.  He reports a history of intermittent diarrhea - did have Cdiff many years ago but this smells different.  No abdominal pain, no nausea, no vomiting.  No blood in diarrhea.  Appears last c-scope in 2015 with no evidence of infection or inflammatory processes.  He does report IBS which is different that this.       * Acute respiratory failure with hypoxia    - Resolved.   - S/P Bronch and intubation 3/14 now post tracheostomy due to inability to extubate   - Pulmonology signed off. Completely weaned off vent.   - RSV positive early and completed course of Ribavarin/IVIG.  - ID had been following. Completed 7 day course of Meropenem/Linezolid earlier in hospitalization  - Appreciate PT/OT/Wound care.        Alteration in skin integrity    - left message for wound care today 4/16.        Restrictive cardiomyopathy    - No changes from before.   - Has known history of recurrent ascites and pleural effusions   - S/P thoracentesis X 2, followed by VATS/pleurex  cath placement (January 2018) with removal of pleurex (February 2018) and 3rd thoracentesis 2/14/18 with no significant findings on fluid removal.        Type 1 diabetes mellitus with stage 3 chronic kidney disease    - Appreciate Endocrine's recs  - Insulin gtt per endocrine recs        Hypothyroidism due to Hashimoto's thyroiditis    - Appreciate Endocrine's recs  - Continue Levothyroxine 150mcg per NG tube (adjusted over the weekend)        Acute renal failure superimposed on stage 4 chronic kidney disease    - Appreciate Nephrology recs.   - Continue middorine for pressure support  - HD per nephrology.         Anemia    - Transfused 2 units of PRBCs on 4/11 during HD  - Nephrology has resumed ProCrit         Heart transplanted    - TTE 3/26/18 showed normal graft function but has known history of restrictive CM post transplant.  - Continue pred 7.5, Tacro switched to SL as levels consistently low (2/2)  - Cellcept remains on hold  - HD today.        Debility    - PT/OT daily.   - Expect long road to functional recovery.   - PEG tube placed and tolerating feeds.   - Nutrition following.    - switched from glucerna to novasource renal (start at 10 and increase to goal of 40 as tolerated)        Anxiety    - Continue PTA nortriptyline / escitalopram  - Continue low dose xanax PRN            JACOBY DeyC  Heart Transplant  [facility]

## 2018-04-16 NOTE — PLAN OF CARE
Problem: Occupational Therapy Goal  Goal: Occupational Therapy Goal  Goals to be met by: 4/23/2018    Pt will follow 75% of motor commands with <2 verbal cues for repetition of task to maximize engagement in grooming task.--MET  Pt will complete feeding with mod A.   Pt will complete supine with HOB slightly elevated to seated at EOB with mod A in prep for seated grooming task.  Pt will engage in BUE exercises in orders to increase strength to 3+/5 in BUE's to increase engagement in seated grooming task  Pt will sit at EOB for approx 10 minutes with mod A and unilateral UE support to complete grooming task  Pt will complete sit>stand from EOB with bed in lowest position with mod A in prep for transfer to [facility]     Outcome: Ongoing (interventions implemented as appropriate)  Patient's goals are appropriate.  AMADOU Coon  4/16/2018

## 2018-04-16 NOTE — ASSESSMENT & PLAN NOTE
continue levothyroxine to 150mcg daily  Spoke with nursing and will hold TF for 45 mins daily to administer levothyroxine      Recheck tft's 1st week of May

## 2018-04-16 NOTE — SUBJECTIVE & OBJECTIVE
Interval History:   NAEON. Mild hyperkalemia this morning again 5.8, otherwise, electrolytes stable. FK-506 level was 2.8. Off RRT overnight. Remains anuric, net gain balance 1.3 L/24h.  He feels better today.     Review of patient's allergies indicates:   Allergen Reactions    No known drug allergies      Current Facility-Administered Medications   Medication Frequency    0.9%  NaCl infusion (for blood administration) Q24H PRN    0.9%  NaCl infusion (for blood administration) Q24H PRN    0.9%  NaCl infusion PRN    alprazolam ODT dissolvable tablet 0.5 mg TID PRN    chlorhexidine 0.12 % solution 15 mL BID    epoetin jose miguel injection 4,000 Units Every Mon, Wed, Fri    escitalopram oxalate tablet 20 mg Daily    famotidine tablet 20 mg QHS    heparin (porcine) injection 1,000 Units PRN    heparin (porcine) injection 5,000 Units Q12H    insulin aspart U-100 pen 0-4 Units Q4H PRN    insulin regular (Humulin R) 100 Units in sodium chloride 0.9% 100 mL infusion Continuous    [START ON 4/16/2018] levothyroxine tablet 150 mcg Daily    midodrine tablet 10 mg PRN    midodrine tablet 2.5 mg TID    nitroGLYCERIN 2% TD oint ointment 0.5 inch Q12H    nortriptyline capsule 10 mg QHS    ondansetron disintegrating tablet 4 mg Q6H PRN    oxyCODONE 5 mg/5 mL solution 10 mg Q12H    polyethylene glycol packet 17 g Daily    povidone-iodine 10 % ointment PRN    predniSONE tablet 7.5 mg Daily    promethazine (PHENERGAN) 6.25 mg in dextrose 5 % 50 mL IVPB Q6H PRN    sennosides 8.8 mg/5 ml syrup 5 mL QHS    tacrolimus (PROGRAF) 1 mg/mL oral syringe Daily    [START ON 4/16/2018] tacrolimus (PROGRAF) 1 mg/mL oral syringe Daily       Objective:     Vital Signs (Most Recent):  Temp: 98.1 °F (36.7 °C) (04/15/18 1712)  Pulse: (!) 121 (04/15/18 1712)  Resp: 16 (04/15/18 1712)  BP: (!) 109/59 (04/15/18 1712)  SpO2: 99 % (04/15/18 1712)  O2 Device (Oxygen Therapy): tracheostomy collar (04/15/18 1645) Vital Signs (24h  Range):  Temp:  [98.1 °F (36.7 °C)-98.6 °F (37 °C)] 98.1 °F (36.7 °C)  Pulse:  [108-121] 121  Resp:  [14-18] 16  SpO2:  [98 %-99 %] 99 %  BP: ()/(59-80) 109/59     Weight: 65 kg (143 lb 4.8 oz) (04/15/18 0445)  Body mass index is 22.44 kg/m².  Body surface area is 1.75 meters squared.    I/O last 3 completed shifts:  In: 1879.7 [I.V.:32.2; Other:600; NG/GT:1197.5; IV Piggyback:50]  Out: 1600 [Other:1600]    Physical Exam   Constitutional: He appears well-developed and well-nourished. No distress.   HENT:   Head: Normocephalic and atraumatic.   Eyes: Conjunctivae and EOM are normal.   Cardiovascular: Regular rhythm.  Tachycardia present.    Pulmonary/Chest: He has no wheezes. He has no rales.   Abdominal: Soft. He exhibits no distension.   Musculoskeletal: He exhibits edema. He exhibits no tenderness or deformity.   Neurological: He is alert.   Skin: Skin is warm and dry. He is not diaphoretic.       Significant Labs:  CBC:     Recent Labs  Lab 04/15/18  0743   WBC 5.90   RBC 2.89*   HGB 8.9*   HCT 29.3*      *   MCH 30.8   MCHC 30.4*     CMP:     Recent Labs  Lab 04/15/18  0743   *   CALCIUM 9.8   ALBUMIN 2.2*   PROT 6.1      K 5.8*   CO2 27      BUN 62*   CREATININE 3.6*   ALKPHOS 191*   ALT 11   AST 15   BILITOT 0.6

## 2018-04-17 PROBLEM — E43 SEVERE MALNUTRITION: Status: ACTIVE | Noted: 2018-01-01

## 2018-04-17 PROBLEM — L89.96 DECUBITUS ULCER WITH SUSPECTED DEEP TISSUE INJURY: Status: ACTIVE | Noted: 2018-01-01

## 2018-04-17 NOTE — ASSESSMENT & PLAN NOTE
- TTE 3/26/18 showed normal graft function but has known history of restrictive CM post transplant.  - Continue pred 7.5, Tacro switched to SL as levels consistently low   - Cellcept remains on hold  - HD done yesterday

## 2018-04-17 NOTE — ASSESSMENT & PLAN NOTE
- Continue PTA  Escitalopram, will stop xanex and nortriptyline due to hallucinations and give sleep med and less pain meds

## 2018-04-17 NOTE — PROGRESS NOTES
"[facility]  Endocrinology  Progress Note    Admit Date: 3/11/2018     Reason for Consult: abnormal TFTs    Surgical Procedure and Date: s/p heart transplant 2014     Diabetes diagnosis year: 7yo (T1DM)     Home Diabetes Medications:    Levemir 15u qHS  Novolog 4u AC     How often checking glucose at home? 4 times daily   BG readings on regimen: 150-200s  Hypoglycemia on the regimen?  Yes, rarely - treats appropriately (light snack, glucose tab)  Missed doses on regimen?  No        Diabetes Complications include:     Hyperglycemia, Hypoglycemia  and Diabetic chronic kidney disease          Complicating diabetes co morbidities:   N/A     HPI:   Patient is a 42 y.o. male with a diagnosis of T1DM, HTN, hypothyroidism, s/p heart transplant.  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 He required serial paracentesis and thoracentesis until he underwent a VATS with pleurX catheter placement on 1/11/2018 and removed 2/7/18.       Presented to hospital secondary to diarrhea and cough.  Upon initial labs, TSH was decreased (0.101) and free T4 1.33.  Endocrinology consulted to assist in management of these labs.  He was last seen in clinic by Kamala Vázquez NP 11/2017.   Previous hospitalization, endocrine consulted for same problem.  During that visit, recommended decreasing LT4 to 125mcg daily. Unfortunately, patient was discharged on previous dose of 150mcg daily.     Interval HPI:   Overnight events: decreased insulin requirements with change in tf  Eating:   NPO  Nausea: No  Hypoglycemia and intervention: No  Fever: No  TPN and/or TF: Yes  If yes, type of TF/TPN and rate: 50cc/hr    BP 97/65 (BP Location: Right arm, Patient Position: Lying)   Pulse (!) 111   Temp 98 °F (36.7 °C) (Oral)   Resp 18   Ht 5' 7" (1.702 m)   Wt 66.9 kg (147 lb 7.8 oz)   SpO2 100%   BMI 23.10 kg/m²       Labs " Reviewed and Include      Recent Labs  Lab 04/17/18  0554   GLU 81   CALCIUM 8.6*   ALBUMIN 2.1*   PROT 5.9*      K 5.3*   CO2 21*      BUN 26*   CREATININE 1.9*   ALKPHOS 222*   ALT 15   AST 26   BILITOT 0.5     Lab Results   Component Value Date    WBC 5.01 04/17/2018    HGB 8.4 (L) 04/17/2018    HCT 26.3 (L) 04/17/2018    MCV 98 04/17/2018     04/16/2018       Recent Labs  Lab 04/15/18  0743   TSH 12.752*   FREET4 0.94     Lab Results   Component Value Date    HGBA1C 5.1 04/05/2018       Nutritional status:   Body mass index is 23.1 kg/m².  Lab Results   Component Value Date    ALBUMIN 2.1 (L) 04/17/2018    ALBUMIN 2.2 (L) 04/16/2018    ALBUMIN 2.2 (L) 04/15/2018     Lab Results   Component Value Date    PREALBUMIN 13 (L) 04/08/2018    PREALBUMIN 5 (L) 03/15/2018    PREALBUMIN 18 (L) 03/24/2015       Estimated Creatinine Clearance: 46.4 mL/min (A) (based on SCr of 1.9 mg/dL (H)).    Accu-Checks  Recent Labs      04/15/18   2057  04/16/18   0058  04/16/18   0332  04/16/18   0725  04/16/18   0929  04/16/18   1246  04/16/18   1657  04/16/18   2011  04/17/18   0007  04/17/18   0755   POCTGLUCOSE  192*  181*  194*  204*  171*  200*  166*  192*  118*  110       Current Medications and/or Treatments Impacting Glycemic Control  Immunotherapy:  Immunosuppressants         Stop Route Frequency     tacrolimus capsule 2 mg      -- SL 2 times daily        Steroids:   Hormones     Start     Stop Route Frequency Ordered    04/06/18 0900  predniSONE tablet 7.5 mg      -- PER G TUBE Daily 04/05/18 0926        Pressors:    Autonomic Drugs     Start     Stop Route Frequency Ordered    04/13/18 0859  midodrine tablet 10 mg      -- Oral As needed (PRN) 04/13/18 0800    04/09/18 1500  midodrine tablet 2.5 mg      -- Oral 3 times daily 04/09/18 1006        Hyperglycemia/Diabetes Medications: Antihyperglycemics     Start     Stop Route Frequency Ordered    04/11/18 0015  insulin regular (Humulin R) 100 Units in sodium  chloride 0.9% 100 mL infusion     Question:  Insulin Rate Adjustment (DO NOT MODIFY ANSWER)  Answer:  [email or web address] [email or web address]    -- IV Continuous 04/10/18 2313    04/10/18 2221  insulin aspart U-100 pen 0-4 Units      -- SubQ Every 4 hours PRN 04/10/18 2122          ASSESSMENT and PLAN    * Acute respiratory failure with hypoxia    Per primary  S/p trach.  Practicing with speech valve.  Remains NPO        Type 1 diabetes mellitus with stage 3 chronic kidney disease    BG goal 140-180     decreased transition gtt to 0.9 u/hr    Pt is T1DM, do not suspend insulin >1 hr   If TF held, decrease insulin rate to 0.2u/hr - known to have controlled BG on basal needs of lev 5 daily/ 0.2u/hr during this hospitalization        Discharge Recommendations:  TBD.        Hypothyroidism due to Hashimoto's thyroiditis    continue iv levothyroxine 75mcg daily  Recheck tft's late week          On enteral nutrition    TF at goal.  May increase insulin needs        Current chronic use of systemic steroids    On PDN 7.5mg daily  Can increase insulin requirement        CKD (chronic kidney disease), stage IV    Titrate cautiously.  Caution with insulin stacking.  Avoid hypoglycemia.        Heart transplanted    Per transplant  Avoid hypoglycemia  On PDN and prograf - could lead to prandial elevations and insulin resistance.            Ankur Keith MD  Endocrinology  [facility]

## 2018-04-17 NOTE — PT/OT/SLP PROGRESS
Physical Therapy Treatment    Patient Name:  Lv Crocker   MRN:  [id number]    Recommendations:     Discharge Recommendations:  LTACH (long term acute care hospital)   Discharge Equipment Recommendations:  (TBD)   Barriers to discharge: Inaccessible home and Decreased caregiver support    Assessment:     Lv Crocker is a 42 y.o. male admitted with a medical diagnosis of Acute respiratory failure with hypoxia.  He presents with the following impairments/functional limitations:  impaired skin, impaired cardiopulmonary response to activity, impaired joint extensibility, impaired muscle length, impaired fine motor, decreased ROM, impaired coordination, abnormal tone, pain, decreased lower extremity function, decreased upper extremity function, decreased coordination, impaired self care skills, impaired endurance, weakness, impaired functional mobilty, impaired balance. Pt continues to require increased assist to complete bed mobility and maintain sitting balance at EOB. Unable to attempt standing this date 2* LE weakness, fatigue, and pain. Tolerated upright sitting at EOB but with decreased postural control throughout, requiring tactile cues/facilitation throughout for improvement. Pt would continue to benefit from skilled acute PT in order to address current deficits and progress functional mobility.      Rehab Prognosis:  Fair-good; patient would benefit from acute skilled PT services to address these deficits and reach maximum level of function.      Recent Surgery: Procedure(s) (LRB):  INSERTION-CATHETER-PERM-A-CATH (Left)  INSERTION-TUBE-GASTROSTOMY (N/A) 13 Days Post-Op    Plan:     During this hospitalization, patient to be seen 5 x/week to address the above listed problems via gait training, therapeutic activities, therapeutic exercises, neuromuscular re-education  · Plan of Care Expires:  05/06/18   Plan of Care Reviewed with: patient, spouse    Subjective     Communicated with RN prior to  session.  Patient found supine upon PT entry to room, agreeable to treatment.      Chief Complaint: BLE, B shoulder, and back pain   Pain/Comfort:  · Pain Rating 1:  (did not rate)  · Location 1:  (BLE, back, and B shoulders)  · Pain Addressed 1: Reposition, Distraction, Cessation of Activity    Patients cultural, spiritual, Cheondoism conflicts given the current situation: none noted     Objective:     Patient found with: tracheostomy, oxygen, telemetry, peripheral IV, pressure relief boots, PEG Tube     General Precautions: Standard, fall, respiratory   Orthopedic Precautions:N/A   Braces: N/A     Functional Mobility:  · Bed Mobility:     · Rolling Right: total assistance x2 reps  · Scooting: total assistance of 2 persons (drawsheet to HOB), x2 reps  · Supine to Sit: total assistance and of 2 persons  · Sit to Supine: total assistance and of 2 persons  · Balance: sitting: mod-maxA with increased posterior lean throughout       AM-PAC 6 CLICK MOBILITY  Turning over in bed (including adjusting bedclothes, sheets and blankets)?: 2  Sitting down on and standing up from a chair with arms (e.g., wheelchair, bedside commode, etc.): 2  Moving from lying on back to sitting on the side of the bed?: 2  Moving to and from a bed to a chair (including a wheelchair)?: 2  Need to walk in hospital room?: 1  Climbing 3-5 steps with a railing?: 1  Total Score: 10       Therapeutic Activities and Exercises:  -PMSV applied at beginning of session and removed at end of session. Tolerated valve well throughout with no s/s of distress.   -Performed static sitting at EOB x~15 minutes with mod-maxA for sitting balance with noted posterior lean, posterior pelvic tilt, forward head, cervical flexion, rounded shoulders. Attempted to position BUE to assist with sitting balance and joint approximation; difficulty maintaining BUE in WB position. PT provided postural cues and assist throughout for increased thoracic and cervical extension,  anterior pelvic tilt, and scapular retraction. Pt able to briefly maintain cervical extension without assist, but fatigued quickly.   -Performed UE therex with OT while PT provided trunk and UE assist: supported UE and provided AAROM shoulder flexion; provided pec stretch and AAROM scapular retraction   -Education provided on pressure relief boot positioning to assist with maintaining ankle DF       Patient left supine with all lines intact, call button in reach and pt's wife present..    GOALS:    Physical Therapy Goals        Problem: Physical Therapy Goal    Goal Priority Disciplines Outcome Goal Variances Interventions   Physical Therapy Goal     PT/OT, PT Ongoing (interventions implemented as appropriate)     Description:  Goals to be met by: 18     Patient will increase functional independence with mobility by performin. Supine to sit with Moderate Assistance.  2. Sit to supine with Moderate Assistance.  3. Rolling to Left and Right with Minimal Assistance.   4. Sit to stand transfer with Moderate Assistance.  5. Bed to chair transfer with Maximum Assistance.  6. Gait  x 5 feet with Minimal Assistance.   7.  Pt to perf and be compliant with LE HEP to improve vascularity and mm strength.                          Time Tracking:     PT Received On: 18  PT Start Time: 1506     PT Stop Time: 1534  PT Total Time (min): 28 min     Billable Minutes: Therapeutic Activity 8 and Neuromuscular Re-education 20   (co-tx with OT)    Treatment Type: Treatment  PT/PTA: PT     PTA Visit Number: 0     Petra Lake PT, DPT   2018

## 2018-04-17 NOTE — CARE UPDATE
Chart check completed, abnormal VS noted, bedside RNAnnabelle contacted, no concerns verbalized at this time, instructed to call [phone] for further concerns or assistance.    Vitals:    04/17/18 1133   BP: 102/62   Pulse: (!) 111   Resp: 17   Temp: 97.5 °F (36.4 °C)

## 2018-04-17 NOTE — PROGRESS NOTES
[facility]  Nephrology  Progress Note    Patient Name: Lv Crocker  MRN: [id number]  Admission Date: 3/11/2018  Hospital Length of Stay: 36 days  Attending Provider: Behzad Lara Jr.,*   Primary Care Physician: Philippe Mohr MD  Principal Problem:Acute respiratory failure with hypoxia    Subjective:     HPI: Mr. Crocker is a 43 yo WM with T1DM, Hashimoto thyroiditis, h/o OHTx 11/2014, and CKD stage 4 who was admitted on 3/11/18 for persistent diarrhea. He reported a 3 week history of diarrhea up to 15x/day. Associated symptoms including URI symptoms, coughing fits, and coughing syncope. Prograf level was 14.3. His post-heart transplant course has been complicated by AMR 4/2015, CMV, post-transplant restrictive cardiomyopathy, recurrent pleural effusions/ascites requiring monthly thoracenteses/paracenteses, VATS 1/11/18 with Pleur-X catheter (now removed), and CKD stage 4 with baseline sCr 2.6-3.0. Baseline -120s and baseline BP 90s-110s/60-70s. Upon admission he was started on IVF and diarrhea workup was initiated. On 3/12 he was noted to have decreased UOP despite fluids; NS was increased to 100cc/hr. He was scheduled for a colonoscopy on 3/13 however procedure was cancelled due to hypoxia and fever. He was transferred to the ICU for closer monitoring. He continued to have oliguria overnight. Bladder scan revealed 200cc of urine but patient refused osullivan catheter placement. Wife reports that patient always has a hard time urinating again after osullivan catheters are placed/removed so he refuses them. He remained hypoxic despite FiO2 70% and ABG revealed combined respiratory and metabolic acidosis with pH 7.17. He was started on BiPAP as well as a bicarb infusion at 50cc/hr. ABG with mild improvement. AM labs revealed K 6.2 and he was shifted and given kayexalate.Trialysis catheter was placed this morning and he subsequently developed hypotension and was started on levophed.  He was intubated later this morning. Nephrology consulted for CACHORRO. All history obtained by primary team and chart review as patient was intubated/sedated on exam. Consent for dialysis obtained by patient's wife and placed in chart. Of note, both of patient's parents were on dialysis.     Interval History:  Tolerated HD treatment yesterday with 1L net fluid removal.  This morning with mild hyperkalemia, tube feeds changed yesterday.  Oxygenating well on TC with no complaints.    Review of patient's allergies indicates:   Allergen Reactions    No known drug allergies      Current Facility-Administered Medications   Medication Frequency    0.9%  NaCl infusion PRN    0.9%  NaCl infusion PRN    acetaminophen oral solution 999.0148 mg TID    cetirizine tablet 5 mg Daily    chlorhexidine 0.12 % solution 15 mL BID    epoetin jose miguel injection 4,000 Units Every Mon, Wed, Fri    [START ON 4/18/2018] escitalopram oxalate tablet 10 mg Daily    famotidine tablet 20 mg QHS    heparin (porcine) injection 1,000 Units PRN    heparin (porcine) injection 5,000 Units Q12H    insulin aspart U-100 pen 0-4 Units Q4H PRN    insulin regular (Humulin R) 100 Units in sodium chloride 0.9% 100 mL infusion Continuous    levothyroxine injection 75 mcg Daily    midodrine tablet 10 mg PRN    midodrine tablet 2.5 mg TID    nitroGLYCERIN 2% TD oint ointment 0.5 inch Q12H    ondansetron disintegrating tablet 4 mg Q6H PRN    oxyCODONE 5 mg/5 mL solution 5 mg Q6H PRN    polyethylene glycol packet 17 g Daily    povidone-iodine 10 % ointment PRN    predniSONE tablet 7.5 mg Daily    QUEtiapine tablet 25 mg Once    sennosides 8.8 mg/5 ml syrup 5 mL QHS    tacrolimus capsule 2 mg BID       Objective:     Vital Signs (Most Recent):  Temp: 97.5 °F (36.4 °C) (04/17/18 1133)  Pulse: 108 (04/17/18 1429)  Resp: 17 (04/17/18 1133)  BP: 102/62 (04/17/18 1133)  SpO2: 100 % (04/17/18 1300)  O2 Device (Oxygen Therapy): Comfort Flow (h2o changed)  (04/17/18 1300) Vital Signs (24h Range):  Temp:  [97.3 °F (36.3 °C)-98 °F (36.7 °C)] 97.5 °F (36.4 °C)  Pulse:  [108-118] 108  Resp:  [17-18] 17  SpO2:  [100 %] 100 %  BP: ()/(45-66) 102/62     Weight: 66.9 kg (147 lb 7.8 oz) (04/16/18 0330)  Body mass index is 23.1 kg/m².  Body surface area is 1.78 meters squared.    I/O last 3 completed shifts:  In: 1870.2 [I.V.:82.7; Other:600; NG/GT:1187.5]  Out: 1700 [Drains:100; Other:1600]    Physical Exam   Constitutional: He appears well-developed and well-nourished. No distress.   HENT:   Head: Normocephalic and atraumatic.   Eyes: Conjunctivae and EOM are normal.   Cardiovascular: Regular rhythm.  Tachycardia present.    Pulmonary/Chest: He has no wheezes. He has no rales.   Abdominal: Soft. He exhibits no distension.   Musculoskeletal: He exhibits edema. He exhibits no tenderness or deformity.   Neurological: He is alert.   Skin: Skin is warm and dry. He is not diaphoretic.       Significant Labs:  CBC:   Recent Labs  Lab 04/17/18  0810   WBC 5.01   RBC 2.69*   HGB 8.4*   HCT 26.3*      MCV 98   MCH 31.2*   MCHC 31.9*     CMP:   Recent Labs  Lab 04/17/18  0554   GLU 81   CALCIUM 8.6*   ALBUMIN 2.1*   PROT 5.9*      K 5.3*   CO2 21*      BUN 26*   CREATININE 1.9*   ALKPHOS 222*   ALT 15   AST 26   BILITOT 0.5            Assessment/Plan:     Acute renal failure with acute tubular necrosis superimposed on stage 3 chronic kidney disease    - baseline sCr 2.6-3.0 consistent with CKD stage IV  - anuric CACHORRO; likely ischemic ATN from prolonged pre-renal state (diarrhea) in setting of prograf renal vasoconstriction; cannot r/o septic ATN or Prograf toxicity  - SLED started 3/14. TDC placed 4/4/18.   - remains anuric  - Blood pressure have been stable today   -Tolerated HD treatment well yesterda with 1L net fluid removal.    Plan:  Procrit with next HD  Continue Nepro tube feeds.    Will plan to hold RRT today, next dialysis session tomorrow.             Juaquin Ibrahim, NETTIE  Nephrology  [facility]  Pager:  [phone]      I have reviewed and concur with the NPs history, physical, assessment, and plan. I have personally interviewed and examined the patient at bedside

## 2018-04-17 NOTE — SUBJECTIVE & OBJECTIVE
Interval History: patient with hallucinations of cockroaches last night, in chair for 2 hours yesterday     Continuous Infusions:   insulin (HUMAN R) infusion (adults) 0.9 Units/hr (04/17/18 0947)     Scheduled Meds:   chlorhexidine  15 mL Mouth/Throat BID    epoetin jose miguel (PROCRIT) injection  4,000 Units Intravenous Every Mon, Wed, Fri    escitalopram oxalate  20 mg Per G Tube Daily    famotidine  20 mg Per G Tube QHS    heparin (porcine)  5,000 Units Subcutaneous Q12H    levothyroxine  75 mcg Intravenous Daily    midodrine  2.5 mg Oral TID    nitroGLYCERIN 2% TD oint  0.5 inch Topical (Top) Q12H    oxyCODONE  10 mg Oral Once    polyethylene glycol  17 g Oral Daily    predniSONE  7.5 mg Per G Tube Daily    sennosides 8.8 mg/5 ml  5 mL Oral QHS    tacrolimus  2 mg Sublingual BID     PRN Meds:sodium chloride, sodium chloride, sodium chloride 0.9%, sodium chloride 0.9%, alprazolam ODT, heparin (porcine), hydrOXYzine HCl, insulin aspart U-100, midodrine, ondansetron, oxyCODONE, povidone-iodine    Review of patient's allergies indicates:   Allergen Reactions    No known drug allergies      Objective:     Vital Signs (Most Recent):  Temp: 98 °F (36.7 °C) (04/17/18 0753)  Pulse: (!) 111 (04/17/18 0753)  Resp: 18 (04/17/18 0753)  BP: 97/65 (04/17/18 0753)  SpO2: 100 % (04/17/18 0952) Vital Signs (24h Range):  Temp:  [97.3 °F (36.3 °C)-98.2 °F (36.8 °C)] 98 °F (36.7 °C)  Pulse:  [111-118] 111  Resp:  [17-18] 18  SpO2:  [99 %-100 %] 100 %  BP: ()/(45-66) 97/65     Patient Vitals for the past 72 hrs (Last 3 readings):   Weight   04/16/18 0330 66.9 kg (147 lb 7.8 oz)   04/15/18 0445 65 kg (143 lb 4.8 oz)     Body mass index is 23.1 kg/m².      Intake/Output Summary (Last 24 hours) at 04/17/18 1011  Last data filed at 04/17/18 0755   Gross per 24 hour   Intake          1445.49 ml   Output             1700 ml   Net          -254.51 ml       Hemodynamic Parameters:     Physical Exam   Constitutional: He  appears well-developed and well-nourished. No distress.   HENT:   Head: Normocephalic and atraumatic.   Eyes: Conjunctivae and EOM are normal.   Cardiovascular: Regular rhythm.  Tachycardia present.    Pulmonary/Chest: He has no wheezes. He has no rales.   Abdominal: Soft. He exhibits no distension.   Musculoskeletal: He exhibits edema. He exhibits no tenderness or deformity.   Neurological: He is alert.   Skin: Skin is warm and dry. He is not diaphoretic.       Significant Labs:  CBC:    Recent Labs  Lab 04/14/18  0504 04/15/18  0743 04/16/18  0824 04/17/18  0810   WBC 7.03 5.90 4.35 5.01   RBC 2.91* 2.89* 2.71* 2.69*   HGB 9.1* 8.9* 8.4* 8.4*   HCT 29.4* 29.3* 27.7* 26.3*    191 181  --    * 101* 102* 98   MCH 31.3* 30.8 31.0 31.2*   MCHC 31.0* 30.4* 30.3* 31.9*     BNP:  No results for input(s): BNP in the last 168 hours.    Invalid input(s): BNPTRIAGELBLO  CMP:    Recent Labs  Lab 04/15/18  0743 04/16/18  0824 04/17/18  0554   * 187* 81   CALCIUM 9.8 9.6 8.6*   ALBUMIN 2.2* 2.2* 2.1*   PROT 6.1 6.3 5.9*    138 137   K 5.8* 5.8* 5.3*   CO2 27 28 21*    103 107   BUN 62* 49* 26*   CREATININE 3.6* 2.8* 1.9*   ALKPHOS 191* 207* 222*   ALT 11 13 15   AST 15 18 26   BILITOT 0.6 0.6 0.5      Coagulation:   No results for input(s): PT, INR, APTT in the last 168 hours.  LDH:  No results for input(s): LDH in the last 72 hours.  Microbiology:  Microbiology Results (last 7 days)     Procedure Component Value Units Date/Time    Fungus culture [[id number]] Collected:  03/28/18 1607    Order Status:  Completed Specimen:  Body Fluid from Lung, RLL Updated:  04/12/18 0936     Fungus (Mycology) Culture Culture in progress     Fungus (Mycology) Culture No fungus isolated after 2 weeks          I have reviewed all pertinent labs within the past 24 hours.    Estimated Creatinine Clearance: 46.4 mL/min (A) (based on SCr of 1.9 mg/dL (H)).    Diagnostic Results:  I have reviewed and interpreted all  pertinent imaging results/findings within the past 24 hours.

## 2018-04-17 NOTE — SUBJECTIVE & OBJECTIVE
"Interval HPI:   Overnight events: decreased insulin requirements with change in tf  Eating:   NPO  Nausea: No  Hypoglycemia and intervention: No  Fever: No  TPN and/or TF: Yes  If yes, type of TF/TPN and rate: 50cc/hr    BP 97/65 (BP Location: Right arm, Patient Position: Lying)   Pulse (!) 111   Temp 98 °F (36.7 °C) (Oral)   Resp 18   Ht 5' 7" (1.702 m)   Wt 66.9 kg (147 lb 7.8 oz)   SpO2 100%   BMI 23.10 kg/m²     Labs Reviewed and Include      Recent Labs  Lab 04/17/18  0554   GLU 81   CALCIUM 8.6*   ALBUMIN 2.1*   PROT 5.9*      K 5.3*   CO2 21*      BUN 26*   CREATININE 1.9*   ALKPHOS 222*   ALT 15   AST 26   BILITOT 0.5     Lab Results   Component Value Date    WBC 5.01 04/17/2018    HGB 8.4 (L) 04/17/2018    HCT 26.3 (L) 04/17/2018    MCV 98 04/17/2018     04/16/2018       Recent Labs  Lab 04/15/18  0743   TSH 12.752*   FREET4 0.94     Lab Results   Component Value Date    HGBA1C 5.1 04/05/2018       Nutritional status:   Body mass index is 23.1 kg/m².  Lab Results   Component Value Date    ALBUMIN 2.1 (L) 04/17/2018    ALBUMIN 2.2 (L) 04/16/2018    ALBUMIN 2.2 (L) 04/15/2018     Lab Results   Component Value Date    PREALBUMIN 13 (L) 04/08/2018    PREALBUMIN 5 (L) 03/15/2018    PREALBUMIN 18 (L) 03/24/2015       Estimated Creatinine Clearance: 46.4 mL/min (A) (based on SCr of 1.9 mg/dL (H)).    Accu-Checks  Recent Labs      04/15/18   2057  04/16/18   0058  04/16/18   0332  04/16/18   0725  04/16/18   0929  04/16/18   1246  04/16/18   1657  04/16/18 2011 04/17/18   0007  04/17/18   0755   POCTGLUCOSE  192*  181*  194*  204*  171*  200*  166*  192*  118*  110       Current Medications and/or Treatments Impacting Glycemic Control  Immunotherapy:  Immunosuppressants         Stop Route Frequency     tacrolimus capsule 2 mg      -- SL 2 times daily        Steroids:   Hormones     Start     Stop Route Frequency Ordered    04/06/18 0900  predniSONE tablet 7.5 mg      -- PER G TUBE Daily " 04/05/18 0926        Pressors:    Autonomic Drugs     Start     Stop Route Frequency Ordered    04/13/18 0859  midodrine tablet 10 mg      -- Oral As needed (PRN) 04/13/18 0800    04/09/18 1500  midodrine tablet 2.5 mg      -- Oral 3 times daily 04/09/18 1006        Hyperglycemia/Diabetes Medications: Antihyperglycemics     Start     Stop Route Frequency Ordered    04/11/18 0015  insulin regular (Humulin R) 100 Units in sodium chloride 0.9% 100 mL infusion     Question:  Insulin Rate Adjustment (DO NOT MODIFY ANSWER)  Answer:  [email or web address]\Images\[email or web address]\InsulinInfusions\InsulinRegAdj [id number]    -- IV Continuous 04/10/18 2313    04/10/18 2221  insulin aspart U-100 pen 0-4 Units      -- SubQ Every 4 hours PRN 04/10/18 2122

## 2018-04-17 NOTE — PLAN OF CARE
Problem: SLP Goal  Goal: SLP Goal  Speech Language Pathology Goals  Goals expected to be met by 4/20/18  1.  Pt will tolerate PMSV for 60  Minutes w/ no change in respiratory status and fair voicing produced.  2.  Pt will complete further evaluation of cognitive-linguistic communication skills to determine the need for additional goals.     Goals expected to be met by 4/13/18     1.  Pt will tolerate PMSV for 3 min w/ no change in respiratory status and fair voicing produced. Met   2.  Pt will complete further evaluation of cognitive-linguistic communication skills to determine the need for additional goals.       Outcome: Ongoing (interventions implemented as appropriate)  Pt progressing with goals. Patient with mild-moderate hoarse vocal quality while donning speaking valve for 35 minutes with ST today. Patient ok to wear speaking valve given the following PMSV Precautions: ONLY wear with supervision, only when awake and alert, Do not sleep with valve on, Wash with soap and water, discontinue if notice S/S respiratory distress. ST to continue to follow. Pending tolerance of PMSV, patient would benefit from further assessment of swallow fx. Thank you.      JOSELYN Delarosa., [facility]-SLP  Speech-Language Pathology  Pager: [phone]  4/17/2018

## 2018-04-17 NOTE — ASSESSMENT & PLAN NOTE
BG goal 140-180     decreased transition gtt to 0.9 u/hr    Pt is T1DM, do not suspend insulin >1 hr   If TF held, decrease insulin rate to 0.2u/hr - known to have controlled BG on basal needs of lev 5 daily/ 0.2u/hr during this hospitalization        Discharge Recommendations:  TBD.

## 2018-04-17 NOTE — NURSING
"Upon 1600 BG check pt confused stating "I won't be here for my prograf. I'm going to get tickets to the rodeo in [place]." RN reoriented pt that he was in the hospital at [facility] and that he was not going to the [place]. Pt adamant that he would be. Wife at bedside also reorienting patient. Will continue to monitor.  "

## 2018-04-17 NOTE — PLAN OF CARE
Problem: Physical Therapy Goal  Goal: Physical Therapy Goal  Goals to be met by: 18     Patient will increase functional independence with mobility by performin. Supine to sit with Moderate Assistance.  2. Sit to supine with Moderate Assistance.  3. Rolling to Left and Right with Minimal Assistance.   4. Sit to stand transfer with Moderate Assistance.  5. Bed to chair transfer with Maximum Assistance.  6. Gait  x 5 feet with Minimal Assistance.   7.  Pt to perf and be compliant with LE HEP to improve vascularity and mm strength.         Outcome: Ongoing (interventions implemented as appropriate)  Goals reviewed and remain appropriate. Pt progressing towards goals.    Petra Lake, PT, DPT   2018  [phone]

## 2018-04-17 NOTE — ASSESSMENT & PLAN NOTE
Contributing Nutrition Diagnosis  Inadequate oral intake    Related to (etiology):  Tracheostomy 2' ARF    Signs and Symptoms (as evidenced by):   40# weight loss in past 3 months, on continues TF - stopped frequently     Interventions/Recommendations (treatment strategy):  See recs    Nutrition Diagnosis Status:   New

## 2018-04-17 NOTE — NURSING
Pt with 0 residuals from PEG tube. Meds crushed and given per peg tube and tube feeds restarted at 10 ml/hr. Will recheck residuals at noon and adjust tube feed if pt is tolerating well.

## 2018-04-17 NOTE — PROGRESS NOTES
Consulted to see for new skin concern to buttocks and follow up on other wounds     04/17/18 1200   ECG   Pulse 109   Rhythm sinus tachycardia       Wound 04/06/18 1700  proximal Toe, third   Date First Assessed/Time First Assessed: 04/06/18 1700   Pre-existing: No  Wound Type: (c)   Side: Right  Orientation: proximal  Location: Toe, third   Wound Image     Wound WDL ex   Dressing Appearance Open to air;No dressing;other (see comments)  (nitroglycerin in use)   Drainage Amount None   Appearance Eschar;Purple;Black;Dry   Tissue loss description Not applicable   Periwound Area Intact;Other (see comments)  (affected area is demarcating)       Wound 04/17/18 1200 Toe, first   Date First Assessed/Time First Assessed: 04/17/18 1200   Side: Left  Location: Toe, first   Wound Image    Wound WDL ex   Dressing Appearance Open to air   Drainage Amount None   Appearance Purple;Maroon   Periwound Area Intact   Care Other (see comments)  (nitroglycerin paste in use)       Pressure Injury 04/12/18 0705 Left Heel Deep tissue injury   Date First Assessed/Time First Assessed: 04/12/18 0705   Pressure Injury Present on Admission: (c)   Side: Left  Location: Heel  Is this injury device related?: No  Staging: Deep tissue injury   Wound Image    Staging D   Dressing Appearance Open to air   Drainage Amount None   Appearance Tan  (discoloration)   Periwound Area Intact   Wound Length (cm) 2   Wound Width (cm) 0.7       Pressure Injury 04/17/18 1200 Right Achilles Deep tissue injury   Date First Assessed/Time First Assessed: 04/17/18 1200   Pressure Injury Present on Admission: no  Side: Right  Location: Achilles  Staging: Deep tissue injury   Wound Image    Staging D   Dressing Appearance Open to air   Drainage Amount None   Appearance Waldron   Periwound Area Intact   Wound Length (cm) 0.3   Wound Width (cm) 0.4       Pressure Injury 04/17/18 1200 Left Buttocks Deep tissue injury   Date First Assessed/Time First Assessed: 04/17/18 1200    Pressure Injury Present on Admission: no  Side: Left  Location: Buttocks  Staging: Deep tissue injury   Wound Image    Staging D   Dressing Appearance Open to air;No dressing   Drainage Amount None   Appearance Purple  (scattered areas)   Periwound Area Redness   Wound Length (cm) 5   Wound Width (cm) 3   Care Cleansed with:;Soap and water;Applied:;Skin Barrier     Bilateral achilles DTIs are fading in color and are improving with the use of the offloading boots.   New purple discoloration to left great toe presenting. Continue to demarcate on Right 1st, 2nd and 3rd toes with nitroglycerin in use. Right thumb discoloration is crusting.   Left buttock with scattered purple discoloration and barrier creams are in use.  Dc Garcia RN CWON  [id number]

## 2018-04-17 NOTE — PLAN OF CARE
"Problem: Patient Care Overview  Goal: Plan of Care Review  Outcome: Ongoing (interventions implemented as appropriate)  Pt experiencing hallucinations, stating, "there are hundreds of cockroaches over there".  Vital signs stable. Pt on insluin drip at 1.2 units/hour.  BG monitoing Q4 hours.  BG readings throughout shift were 192,118, and 102.  Tele monitoring Normal Sinus Tach, HR ranging in the 110-120s.  Trach collar, #6 shiley, 5 L, 28%.  Pt was receiving Novasource via PEG tube.  Tube feed turned off at 0045 due to a residual of 350.  Orders to recheck 4 hours later.  Wife at bedside.  Bed lowered, locked, bedrails up x2, and call bell in reach.  See flowsheet for assessment findings.  Will Continue to monitor.           "

## 2018-04-17 NOTE — PLAN OF CARE
"Pt experiencing hallucinations, stating, "there are hundreds of cockroaches over there".  Vital signs stable. Pt on insluin drip at 1.8 units/hour.  BG monitoing Q4 hours.  BG readings throughout shift were 192,118, and.  Tele monitoring Normal Sinus Tach, HR ranging in the 110-120s.  Trach collar, #6 shiley, 5 L, 28%.  Pt was receiving Novasource via PEG tube.  Tube feed turned off at 0045 dur to a residual of 350.  Orders to recheck 4 hours later.  Wife at bedside.  Bed lowered, locked, bedrails up x2, and call bell in reach.  See flowsheet for assessment findings.  Will Continue to monitor.       "

## 2018-04-17 NOTE — ASSESSMENT & PLAN NOTE
- baseline sCr 2.6-3.0 consistent with CKD stage IV  - anuric CACHORRO; likely ischemic ATN from prolonged pre-renal state (diarrhea) in setting of prograf renal vasoconstriction; cannot r/o septic ATN or Prograf toxicity  - SLED started 3/14. TDC placed 4/4/18.   - remains anuric  - Blood pressure have been stable today   -Tolerated HD treatment well yesterda with 1L net fluid removal.    Plan:  Procrit with next HD  Continue Nepro tube feeds.    Will plan to hold RRT today, next dialysis session tomorrow.

## 2018-04-17 NOTE — PROGRESS NOTES
[facility]  Heart Transplant  Progress Note    Patient Name: Lv Crocker  MRN: [id number]  Admission Date: 3/11/2018  Hospital Length of Stay: 36 days  Attending Physician: Behzad Lara Jr.,*  Primary Care Provider: Philippe Mohr MD  Principal Problem:Acute respiratory failure with hypoxia    Subjective:     Interval History: patient with hallucinations of cockroaches last night, in chair for 2 hours yesterday     Continuous Infusions:   insulin (HUMAN R) infusion (adults) 0.9 Units/hr (04/17/18 0947)     Scheduled Meds:   chlorhexidine  15 mL Mouth/Throat BID    epoetin jose miguel (PROCRIT) injection  4,000 Units Intravenous Every Mon, Wed, Fri    escitalopram oxalate  20 mg Per G Tube Daily    famotidine  20 mg Per G Tube QHS    heparin (porcine)  5,000 Units Subcutaneous Q12H    levothyroxine  75 mcg Intravenous Daily    midodrine  2.5 mg Oral TID    nitroGLYCERIN 2% TD oint  0.5 inch Topical (Top) Q12H    oxyCODONE  10 mg Oral Once    polyethylene glycol  17 g Oral Daily    predniSONE  7.5 mg Per G Tube Daily    sennosides 8.8 mg/5 ml  5 mL Oral QHS    tacrolimus  2 mg Sublingual BID     PRN Meds:sodium chloride, sodium chloride, sodium chloride 0.9%, sodium chloride 0.9%, alprazolam ODT, heparin (porcine), hydrOXYzine HCl, insulin aspart U-100, midodrine, ondansetron, oxyCODONE, povidone-iodine    Review of patient's allergies indicates:   Allergen Reactions    No known drug allergies      Objective:     Vital Signs (Most Recent):  Temp: 98 °F (36.7 °C) (04/17/18 0753)  Pulse: (!) 111 (04/17/18 0753)  Resp: 18 (04/17/18 0753)  BP: 97/65 (04/17/18 0753)  SpO2: 100 % (04/17/18 0952) Vital Signs (24h Range):  Temp:  [97.3 °F (36.3 °C)-98.2 °F (36.8 °C)] 98 °F (36.7 °C)  Pulse:  [111-118] 111  Resp:  [17-18] 18  SpO2:  [99 %-100 %] 100 %  BP: ()/(45-66) 97/65     Patient Vitals for the past 72 hrs (Last 3 readings):   Weight   04/16/18 0330 66.9 kg (147 lb 7.8 oz)    04/15/18 0445 65 kg (143 lb 4.8 oz)     Body mass index is 23.1 kg/m².      Intake/Output Summary (Last 24 hours) at 04/17/18 1011  Last data filed at 04/17/18 0755   Gross per 24 hour   Intake          1445.49 ml   Output             1700 ml   Net          -254.51 ml       Hemodynamic Parameters:     Physical Exam   Constitutional: He appears well-developed and well-nourished. No distress.   HENT:   Head: Normocephalic and atraumatic.   Eyes: Conjunctivae and EOM are normal.   Cardiovascular: Regular rhythm.  Tachycardia present.    Pulmonary/Chest: He has no wheezes. He has no rales.   Abdominal: Soft. He exhibits no distension.   Musculoskeletal: He exhibits edema. He exhibits no tenderness or deformity.   Neurological: He is alert.   Skin: Skin is warm and dry. He is not diaphoretic.       Significant Labs:  CBC:    Recent Labs  Lab 04/14/18  0504 04/15/18  0743 04/16/18  0824 04/17/18  0810   WBC 7.03 5.90 4.35 5.01   RBC 2.91* 2.89* 2.71* 2.69*   HGB 9.1* 8.9* 8.4* 8.4*   HCT 29.4* 29.3* 27.7* 26.3*    191 181  --    * 101* 102* 98   MCH 31.3* 30.8 31.0 31.2*   MCHC 31.0* 30.4* 30.3* 31.9*     BNP:  No results for input(s): BNP in the last 168 hours.    Invalid input(s): BNPTRIAGELBLO  CMP:    Recent Labs  Lab 04/15/18  0743 04/16/18  0824 04/17/18  0554   * 187* 81   CALCIUM 9.8 9.6 8.6*   ALBUMIN 2.2* 2.2* 2.1*   PROT 6.1 6.3 5.9*    138 137   K 5.8* 5.8* 5.3*   CO2 27 28 21*    103 107   BUN 62* 49* 26*   CREATININE 3.6* 2.8* 1.9*   ALKPHOS 191* 207* 222*   ALT 11 13 15   AST 15 18 26   BILITOT 0.6 0.6 0.5      Coagulation:   No results for input(s): PT, INR, APTT in the last 168 hours.  LDH:  No results for input(s): LDH in the last 72 hours.  Microbiology:  Microbiology Results (last 7 days)     Procedure Component Value Units Date/Time    Fungus culture [[id number]] Collected:  03/28/18 1607    Order Status:  Completed Specimen:  Body Fluid from Lung, RLL Updated:   04/12/18 0936     Fungus (Mycology) Culture Culture in progress     Fungus (Mycology) Culture No fungus isolated after 2 weeks          I have reviewed all pertinent labs within the past 24 hours.    Estimated Creatinine Clearance: 46.4 mL/min (A) (based on SCr of 1.9 mg/dL (H)).    Diagnostic Results:  I have reviewed and interpreted all pertinent imaging results/findings within the past 24 hours.    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 Most recently has had ongoing issues with his lungs, had gotten 2 thoracenteses, after which he underwent VATS 01/19/18 followed by pleurex catheter placement and effusion drainage 01/11/18, subsequently had it removed 02/07/18 after drainage had decreased despite multiple attempts to drain it. Has been having significant coughing fits that result in him being near-syncopal at times, although difficult to say if he has passed out or not- patient most recently was admitted to the hospital for increased AGUILAR, coughing and pre-syncope 02/11-02/14/18 at [facility].   Patient was started on antibiotics for suspected bacterial infection, with some diarrhea, bronchitis, and possible pneumonia. Ct chest showed some pleural fluid collection and after talking with Dr. James, we arranged for him to receive a diagnostic tap with IR, from which the fluid collection demonstrated no growth or significant findings at all really. Cell counts do not appear to have been sent but will recheck. Patient states since his hospital stay, yesterday had a significant coughing fit again, after which he nearly passed out, is being seen in clinic today for this. Denies any cardiopulmonary complaints, no leg swelling, has some abdominal swelling, which is moderate for him. Denies significant shortness of breath with  mild exertion, had 6MWT yesterday to see if he qualified for home O2, and his O2 sats actually improved after recovery from where he started initially. He and his wife admit he is in bed most days, not very active.     Mr. Crocker presented to the ED 3/11/18 with approximately 3 weeks of worsening diarrhea and 2-3 days of sinus pressure, drainage, and worsened cough.  He notes that he had a coughing fit last night and passed out, coughed throughout most of the night.  This also happened on 2/25/18.  He last saw Dr Ferreira in clinic on 2/20/18 where he was taken off myfortic and switched to cellcept (he hadn't been on cellcept because of leukopenia when they tried post-transplant).  Though his diarrhea had improved after his last discharge, he states it has increased now to 15x/day, clear/yellow/green, no fevers, no exacerbating foods per say.  He was taken back off the cellcept and placed back on myfortic this past week and has not noticed immediate change in diarrhea. He also reports his baseline coughing fits havent improved and are minimally responsive to tessalon pearls.  Came on last night, he lost consciousness during a fit once which is relatively normal for him, and had two more during HPI.  He notes no sick contacts but does state he's had some URI symptoms as above.  His baseline vitals are usually -120 and BP 90s/60s-110s/70s.  He reports a history of intermittent diarrhea - did have Cdiff many years ago but this smells different.  No abdominal pain, no nausea, no vomiting.  No blood in diarrhea.  Appears last c-scope in 2015 with no evidence of infection or inflammatory processes.  He does report IBS which is different that this.       * Acute respiratory failure with hypoxia    - Resolved.   - S/P Bronch and intubation 3/14 now post tracheostomy due to inability to extubate   - Pulmonology signed off. Completely weaned off vent.   - RSV positive early and completed course of Ribavarin/IVIG.  - ID  had been following. Completed 7 day course of Meropenem/Linezolid earlier in hospitalization  - Appreciate PT/OT/Wound care.        Alteration in skin integrity    - left message for wound care today 4/16.        Restrictive cardiomyopathy    - No changes from before.   - Has known history of recurrent ascites and pleural effusions   - S/P thoracentesis X 2, followed by VATS/pleurex cath placement (January 2018) with removal of pleurex (February 2018) and 3rd thoracentesis 2/14/18 with no significant findings on fluid removal.        Type 1 diabetes mellitus with stage 3 chronic kidney disease    - Appreciate Endocrine's recs  - Insulin gtt per endocrine recs        Hypothyroidism due to Hashimoto's thyroiditis    - Appreciate Endocrine's recs  - Continue Levothyroxine 150mcg per NG tube (adjusted over the weekend)        Acute renal failure with acute tubular necrosis superimposed on stage 3 chronic kidney disease    - Appreciate Nephrology recs.   - Continue middorine for pressure support  - HD per nephrology.         Anemia    - Transfused 2 units of PRBCs on 4/11 during HD  - Nephrology has resumed ProCrit         Heart transplanted    - TTE 3/26/18 showed normal graft function but has known history of restrictive CM post transplant.  - Continue pred 7.5, Tacro switched to SL as levels consistently low   - Cellcept remains on hold  - HD done yesterday        Debility    - PT/OT daily.   - Expect long road to functional recovery.   - PEG tube placed and tolerating feeds.   - Nutrition following.    - switched from glucerna to novasource renal (start at 10 and increase to goal of 40 as tolerated)        Anxiety    - Continue PTA  Escitalopram, will stop xanex and nortriptyline due to hallucinations and give sleep med and less pain meds            JOSE DANIEL Zabala  Heart Transplant  [facility]

## 2018-04-17 NOTE — PROGRESS NOTES
" [facility]  Adult Nutrition  Progress Note    SUMMARY       Recommendations    Recommendation/Intervention: Continue current TF order of Novasource Renal at goal of 40mL/hr to meet 100% EEN/EPN.   -Increase TF by 10mL q4hrs to goal rate of 40mL/hr. Hold for residuals > 500mL. Per ASPEN guidelines, 350mL is not adequate to hold tube feeding.   -This TF formula does not contain fiber. Recommending adding psyllium to TF flushes.   -Add Arginaid BID to water flushes to aid in wound healing.   -RD following    Goals: Meet % EEN, EPN  Nutrition Goal Status: goal met  Communication of RD Recs: reviewed with RN    Reason for Assessment    Reason for Assessment: RD follow-up  Diagnosis: other (see comments) (Resp. fx)  Relevant Medical History: Hashimoto's disease, HTN, HLD, DM, CHF, Heart tx (2014)  Interdisciplinary Rounds: did not attend    General Information Comments: s/p trach/PEG; TF changed from Glucerna to Novasource, was at goal then stopped due to residuals of 350mL, turned back on this AM at 10mL/hr - running at 10mL at visit; family asked about supplement for wound healing, spoke with RN about psyllium and arginaid, family reported 40# weight loss during this hospitalization - started before TF     Nutrition Discharge Planning: Tolerance of TF    Nutrition Risk Screen    Nutrition Risk Screen: large or nonhealing wound, burn or pressure ulcer, dysphagia or difficulty swallowing, tube feeding or parenteral nutrition    Nutrition/Diet History    Patient Reported Diet/Restrictions/Preferences: other (see comments) (HAO)  Do you have any cultural, spiritual, Islam conflicts, given your current situation?: none reported  Factors Affecting Nutritional Intake: NPO    Anthropometrics    Temp: 97.5 °F (36.4 °C)  Height Method: Stated  Height: 5' 7" (170.2 cm)  Height (inches): 67 in  Weight Method: Bed Scale  Weight: 66.9 kg (147 lb 7.8 oz)  Weight (lb): 147.49 lb  Ideal Body Weight (IBW), " Male: 148 lb  % Ideal Body Weight, Male (lb): 93.99 lb  BMI (Calculated): 21.8  BMI Grade: 18.5-24.9 - normal  Usual Body Weight (UBW), kg:  (HAO)       Lab/Procedures/Meds    Pertinent Labs Reviewed: reviewed  Pertinent Labs Comments: K 5.3, BUN 26, Alk Phos 222, Cr 1.9  Pertinent Medications Reviewed: reviewed  Pertinent Medications Comments: epoetin, heparin, prednisone, tacrolimus, insulin    Physical Findings/Assessment    Overall Physical Appearance: underweight, other (see comments) (On trach collar)  Tubes: gastrostomy tube  Oral/Mouth Cavity: WDL  Skin: incision(s)    Estimated/Assessed Needs    Weight Used For Calorie Calculations: 66.9 kg (147 lb 7.8 oz)  Energy Calorie Requirements (kcal): 1896 (1.25x PAL)  Energy Need Method: [place]-St Jeor (1.25 PAL)  Protein Requirements: 80-94g (1.2-1.4 g/kg)  Weight Used For Protein Calculations: 66.9 kg (147 lb 7.8 oz)  Fluid Requirements (mL): 80-94  Fluid Need Method: other (see comments) (Per MD or 1 mL/kcal)  RDA Method (mL): 1896  CHO Requirement: 50% total kcals      Nutrition Prescription Ordered    Current Diet Order: NPO  Current Nutrition Support Formula Ordered: Novasource Renal  Current Nutrition Support Rate Ordered: 40 (ml) (running at 10mL)  Current Nutrition Support Frequency Ordered: mL/hr    Evaluation of Received Nutrient/Fluid Intake    Enteral Calories (kcal): 480  Enteral Protein (gm): 22  Enteral (Free Water) Fluid (mL): 172  Other Calories (kcal): 0  % Kcal Needs: 25  % Protein Needs: 25  IV Fluid (mL): 0  Energy Calories Required: not meeting needs  Protein Required: not meeting needs  Fluid Required: other (see comments) (Per MD or 1 mL/kcal)  Comments: TF running at 10mL/hr  Tolerance: tolerating  % Intake of Estimated Energy Needs: 0 - 25 %  % Meal Intake: NPO    Nutrition Risk    Level of Risk/Frequency of Follow-up: moderate     Assessment and Plan    * Acute respiratory failure with hypoxia      Nutrition Problem  Inadequate energy  intake    Related to (etiology):   Inability to consume sufficient energy    Signs and Symptoms (as evidenced by):   NPO with no alternate means of nutrition     Nutrition Diagnosis Status:   Resolved            Severe malnutrition    Contributing Nutrition Diagnosis  Inadequate oral intake    Related to (etiology):  Tracheostomy 2' ARF, decreased ability to consume sufficient energy    Signs and Symptoms (as evidenced by):   40# weight loss in past 3 months, on continues TF - stopped frequently     Interventions/Recommendations (treatment strategy):  See recs    Nutrition Diagnosis Status:   New               Monitor and Evaluation    Food and Nutrient Intake: enteral nutrition intake  Food and Nutrient Adminstration: enteral and parenteral nutrition administration  Physical Activity and Function: nutrition-related ADLs and IADLs  Anthropometric Measurements: weight, weight change  Biochemical Data, Medical Tests and Procedures: lipid profile, inflammatory profile, glucose/endocrine profile, gastrointestinal profile, electrolyte and renal panel  Nutrition-Focused Physical Findings: overall appearance     Nutrition Follow-Up    RD Follow-up?: Yes

## 2018-04-17 NOTE — PLAN OF CARE
Problem: Nutrition, Imbalanced: Inadequate Oral Intake (Adult)  Intervention: Promote/Optimize Nutrition  Recommendations    Recommendation/Intervention: Continue [facility] TF [facility] at goal of 40mL/hr to meet 100% EEN/EPN.   -Increase TF by 10mL q4hrs to goal rate of 40mL/hr. Hold for residuals > 500mL. Per ASPEN guidelines, 350mL is not adequate to hold tube feeding.   -This TF formula does not contain fiber. Recommending adding psyllium to TF flushes.   -Add Arginaid BID to water flushes to aid in wound healing.   -RD following    Goals: Meet % EEN, EPN  Nutrition Goal Status: goal met  Communication of RD Recs: reviewed with RN

## 2018-04-17 NOTE — PLAN OF CARE
Tub feeds going at 20ml/hour with 350mls of residual. Dr. Wong notified.  Ordered to turn feeds off and recheck residuals in 4 hours.

## 2018-04-17 NOTE — PT/OT/SLP PROGRESS
"Speech Language Pathology Treatment    Patient Name:  Lv Crocker   MRN:  [id number]  Admitting Diagnosis: Acute respiratory failure with hypoxia    Recommendations:                 General Recommendations:  One Way Speaking Valve, possible swallow assessment, further assessment of cognitive-linguistic skills   General Precautions: Standard, fall, respiratory, diabetic  Communication strategies:  One way speaking valve, provide increased time to answer and go to room if call light pushed   PMSV Precautions: Only wear valve with Supervision, only when awake/alert, Do not sleep with valve on, Wash with soap and water    Subjective     SLP reviewed Pt with nurse pre/post session  Pt presents calm, mildly confused  Pt explains, "February"  Patient's spouse asks, "Can I help him wear it today?"  Patient's son asks (via telephone) "Can he have it on this weekend?"    Pain/Comfort:  ·  pt reports 9/10 foot pain, nurse in room to administer medications (tylenol) and verbalizes understanding of pain. MD team at bedside at end of session.     Objective:     Has the patient been evaluated by SLP for swallowing?   No  Keep patient NPO? Yes   Current Respiratory Status: trach cuffless      Patient seen for speaking valve training s/p trach. Patient found awake and alert, spouse at bedside. Patient oriented x2, requires cues for place and time. Pt accepting of PMSV trials and dons valve ~35 minutes with ST today.  Pt with SpO2 100% prior to wearing valve, while wearing valve, and following valve placement.  Patient with mild-moderate hoarse vocal quality while wearing valve. Pt with mildly breathy throat clear while donning valve. Pt with productive cough noted x1 while wearing valve. Patient and Spouse educated on PMSV precautions, safety precautions/S&S respiratory distress, clearing instructions for PMSV valve, and SLP POC. Patient asking when he can eat. Pt educated on anatomy s/p trach and plans for further swallow " assessment pending tolerance of PMSV and vocal quality. Pt verbalizes minimal understanding and requires ongoing review. Spouse verbalizes understanding. No further questions. [place] updated with PMSV precautions. MD team in room at end of session. PMSV tethered to trach collar. Findings/recs reviewed with nurse.     Assessment:     Lv Crocker is a 42 y.o. male with an SLP diagnosis of S/P Trach and One Way Speaking Valve Training.  He presents with mild-moderate hoarse vocal quality while donning valve today.ST to continue to follow.  Patient might benefit from further assessment of cognitive-linguistic skills and swallow fx. Please order if in agreement.     Goals:    SLP Goals        Problem: SLP Goal    Goal Priority Disciplines Outcome   SLP Goal     SLP Ongoing (interventions implemented as appropriate)   Description:  Speech Language Pathology Goals  Goals expected to be met by 4/20/18  1.  Pt will tolerate PMSV for 60  Minutes w/ no change in respiratory status and fair voicing produced.  2.  Pt will complete further evaluation of cognitive-linguistic communication skills to determine the need for additional goals.     Goals expected to be met by 4/13/18     1.  Pt will tolerate PMSV for 3 min w/ no change in respiratory status and fair voicing produced. Met   2.  Pt will complete further evaluation of cognitive-linguistic communication skills to determine the need for additional goals.                         Plan:     · Patient to be seen:  5 x/week   · Plan of Care expires:  05/05/18  · Plan of Care reviewed with:  patient, spouse   · SLP Follow-Up:  Yes       Discharge recommendations:  LTACH (long term acute care hospital)   Barriers to Discharge:  Level of Skilled Assistance Needed     Time Tracking:     SLP Treatment Date:   04/17/18  Speech Start Time:  1025  Speech Stop Time:  1108     Speech Total Time (min):  43 min    Billable Minutes: Therapeutic Speech Device 28 and Seld  Care/Home Management Training 15    JOSELYN Delarosa., [facility]-SLP  Speech-Language Pathology  Pager: [phone]      04/17/2018

## 2018-04-18 PROBLEM — E43 SEVERE MALNUTRITION: Status: ACTIVE | Noted: 2018-01-01

## 2018-04-18 NOTE — PT/OT/SLP PROGRESS
Speech Language Pathology      Lv Crocker  MRN: [id number]    SLP attempts to see Patient for ongoing speaking valve training s/p trach.  Patient MARK for Dialysis upon first and second attempts.  Upon third attempt, Nurse at bedside to assist with changing patient. Patient's nurse reports Patient declining wearing valve following HD 2/2 fatigue.  SLP explains she will re-attempt later service day pending schedule.  MD team outside of Patient's room following third attempt, SLP reviews Patient with MD team and possibility for further cognitive-linguistic assessment and swallow assessment. MD team verbalizes understanding and explains they will place orders. Patient not seen today secondary to dialysis, Patient fatigue, Nursing care. ST unable to make 4th attempt.  Will follow-up 4/19/2018. Thank you.    JOSELYN Delarosa., [facility]-SLP  Speech-Language Pathology  Pager: [phone]  4/18/2018

## 2018-04-18 NOTE — PROGRESS NOTES
UPDATE    [facility] reporting [employer] has denied auth for pt because they feel he is more appropriate for inpatient rehab. SW discussed with MD and  and it is agreed [facility] inpatient rehab will be consulted. SW confirmed pt and wife's understanding and agreement with plan. Pt and wife report coping adequately with no other needs reported at this time. SW providing psychosocial and counseling support, education, resources, and d/c planning as needed. SW continuing to follow and remains available.

## 2018-04-18 NOTE — PT/OT/SLP PROGRESS
Occupational Therapy   Treatment    Name: Lv Crocker  MRN: [id number]  Admitting Diagnosis:  Acute respiratory failure with hypoxia  14 Days Post-Op    Recommendations:     Discharge Recommendations: LTACH (long term acute care hospital)  Discharge Equipment Recommendations:     Barriers to discharge:       Subjective     Communicated with: nsg prior to session.cc with PT  Pain/Comfort:  · Pain Rating 1:  (c/o pain in back and neck)  · Pain Addressed 1: Reposition, Distraction    Patients cultural, spiritual, Sikh conflicts given the current situation: none    Objective:     Patient found with: tracheostomy, oxygen, telemetry, peripheral IV, pressure relief boots, PEG Tube    General Precautions: Standard, fall   Orthopedic Precautions:N/A   Braces:       Occupational Performance:    Bed Mobility:    · Total assist rolling and sup to sit     Functional Mobility/Transfers:  ·   · Functional Mobility: sat EOB x ~15 min with total assist for balance  · Practiced trunk control, core strengthening with wt. Shifts forward and backward, postural exercises, neck exercises    Activities of Daily Living:      Patient left HOB elevated with wife present    AMPA 6 Click:  [facility] Total Score: 6    Treatment & Education:  Pt performed AA/PROM BUE's x 5-10 reps all jt's; performed AAROM in sitting with forward reaching and protraction/retraction, elbow flexion/extension, pt requiring total assist in sitting for balance with activities.  Education:    Assessment:     Lv Crocker is a 42 y.o. male with a medical diagnosis of Acute respiratory failure with hypoxia.  He presents with significant weakness but good effort.  Performance deficits affecting function are weakness, impaired endurance, impaired self care skills, impaired functional mobilty, impaired balance, decreased upper extremity function, decreased lower extremity function, pain, decreased ROM, impaired skin, impaired cardiopulmonary response to  activity.      Rehab Prognosis:  good; patient would benefit from acute skilled OT services to address these deficits and reach maximum level of function.       Plan:     Patient to be seen 5 x/week to address the above listed problems via self-care/home management, therapeutic activities, therapeutic exercises  · Plan of Care Expires: 05/06/18  · Plan of Care Reviewed with: patient, spouse    This Plan of care has been discussed with the patient who was involved in its development and understands and is in agreement with the identified goals and treatment plan    GOALS:    Occupational Therapy Goals        Problem: Occupational Therapy Goal    Goal Priority Disciplines Outcome Interventions   Occupational Therapy Goal     OT, PT/OT Ongoing (interventions implemented as appropriate)    Description:  Goals to be met by: 4/23/2018    Pt will follow 75% of motor commands with <2 verbal cues for repetition of task to maximize engagement in grooming task.--MET  Pt will complete feeding with mod A.   Pt will complete supine with HOB slightly elevated to seated at EOB with mod A in prep for seated grooming task.  Pt will engage in BUE exercises in orders to increase strength to 3+/5 in BUE's to increase engagement in seated grooming task  Pt will sit at EOB for approx 10 minutes with mod A and unilateral UE support to complete grooming task  Pt will complete sit>stand from EOB with bed in lowest position with mod A in prep [place] [facility]                      Time Tracking:     OT Date of Treatment: 04/17/18  OT Start Time: 1503  OT Stop Time: 1535  OT Total Time (min): 32 min    Billable Minutes:Therapeutic Exercise 32 min    Delia oT OT  4/18/2018

## 2018-04-18 NOTE — CONSULTS
Inpatient consult to Physical Medicine Rehab  Consult performed by: BRAYDON SAPP  Consult ordered by: OLLIE PETERS JR  Reason for consult: rehab evaluation      Reviewed patient history and current admission.  Rehab team following.  Full consult to follow.    VAISHALI Caldwell, FNP-C  Physical Medicine & Rehabilitation   04/18/2018  Spectralink: [phone]

## 2018-04-18 NOTE — CONSULTS
PM&R consult follow up.      Attempted evaluation x 2.  Patient off floor for HD.  Will follow up for full evaluation tomorrow.  Patient is functionally low level with poor endurance and is unlikely to tolerate inpatient rehab program at this time.  If approved for IRF, will need discharge plan for when he is discharged from rehab (NH placement, home care, etc), as rehab program is only ~2-3 weeks.  May consider SNF placement first with transition to IRF as patient progresses with therapy.  Will follow.       VAISHALI Caldwell, FNP-C  Physical Medicine & Rehabilitation   04/18/2018  Spectralink: [phone]

## 2018-04-18 NOTE — PLAN OF CARE
Problem: Patient Care Overview  Goal: Plan of Care Review  Outcome: Ongoing (interventions implemented as appropriate)  Patient awake and alert. Oriented to self. Disoriented to situation, time, and place; frequent reorienting. Left FA PIV and UA midline cdi. Insulin increased to 1.3ml/hr per order. accuchecks q4hr. NPO. Tube feed at 30ml/hr; goal rate of 40ml/hr, increase as tolerated. LLQ peg tube cdi. Left chest perm cath cdi; went to HD this morning, removed 1L. Telemetry monitoring, SR-St.  Generalized edema. +6 shiley in place; trach with 5L 25% O2. Left and right feet in heel boots for DTIs and low airloss overlay, frequent turning. Standard precautions maintained.

## 2018-04-18 NOTE — HPI
Lv Crocker is a 42-year-old male with PMHx of DMI, HLD, GERD, depression, arthritis, hashimoto's thyroiditis, CAD, CHF, and cardiomyopathy s/p OHTX 11/18/2014 with early post-op course complicated by hemorrhagic tamponade s/p  washout, delayed closure, pericardial window with subsequent a-fib with RVR and CACHORRO and late course complicated by ABMR (in 4/2015 s/p rituxan, PLEX, and IVIG), C.diff/CMV reactivation, CKD IV, and restrictive cardiomyopathy with subsequent chronic/recurrent pleural effusions and ascites previously requiring monthly paracentesis and thoracentesis until he underwent VATS with pleurX catheter placement on 1/11/2018 with removal on 2/7/2018.  Patient presented to [facility] on 3/11/18 for ~3 weeks of worsening diarrhea and ~3 days of worsening cough with sinus pressure and drainage, SOB, and fevers.  On arrival, he was started on IVF and evaluated by GI for diarrhea work-up.  EGD/colonoscopy attempted; however, procedure aborted 2/2 SOB and hypoxia, and patient was transferred to ICU on 3/13/18 for acute hypoxic respiratory failure ultimately resulting in intubation on 3/14/18.  Found to have multifocal pneumonia 2/2 RSV-A pneumonia with suspected superimposed bacterial pneumonia and completed course of Meropenem/Linezolid and Ribavarin/IVIG.  Hospital course further complicated by CACHORRO on CKD requiring CRRT and now HD s/p perm-a-cath placement on 4/4/18 (nephrology following), shock requiring intermittent pressor support, DIC, several failed extubation s/p tracheostomy on 3/28/18, dysphagia s/p PEG on 4/4/18, anemia requiring transfusions, anxiety and delirium, and debility/deconditioning.      Functional History:  Patient lives in [place], [place], with his wife and 15-year-old son in a single story home with 7 steps to enter.  Prior to admission, he was (I) with ADLs and mobility; however, mobility limited by fatigue/endurance.  Reported over last ~6 months with limited activity.  Most days spent  indoors, in which he ambulated to and from bathroom.  Completed inpatient rehab program after transplant in 2014 with subsequent home health therapy.  Reported no further therapy since that time.  DME: [facility].

## 2018-04-18 NOTE — ASSESSMENT & PLAN NOTE
- TTE 3/26/18 showed normal graft function but has known history of restrictive CM post transplant.  - Decrease pred , Tacro switched to SL as levels consistently low. Improved today.   - Cellcept remains on hold  - HD today

## 2018-04-18 NOTE — ASSESSMENT & PLAN NOTE
"-  Nephrology following "anuric CACHORRO; likely ischemic ATN 2/2 prolonged pre-renal state (diarrhea) in setting of prograf renal vasoconstriction; cannot r/o septic ATN or Prograf toxicity"  -  On HD  -  S/p perm-a-cath placement on 4/4/18  "

## 2018-04-18 NOTE — SUBJECTIVE & OBJECTIVE
"Interval HPI:   Overnight events: bg elevated  Eating:   NPO  Nausea: No  Hypoglycemia and intervention: No  Fever: No  TPN and/or TF: Yes  If yes, type of TF/TPN and rate: 50cc/hr    /67   Pulse 107   Temp 98.4 °F (36.9 °C) (Oral)   Resp 18   Ht 5' 7" (1.702 m)   Wt 66.9 kg (147 lb 7.8 oz)   SpO2 100%   BMI 23.10 kg/m²     Labs Reviewed and Include      Recent Labs  Lab 04/18/18  0534   *   CALCIUM 9.9   ALBUMIN 2.4*   PROT 6.7   *   K 5.6*   CO2 22*      BUN 46*   CREATININE 3.1*   ALKPHOS 230*   ALT 15   AST 16   BILITOT 0.5     Lab Results   Component Value Date    WBC 3.21 (L) 04/18/2018    HGB 7.8 (L) 04/18/2018    HCT 25.7 (L) 04/18/2018     (H) 04/18/2018     04/18/2018       Recent Labs  Lab 04/15/18  0743   TSH 12.752*   FREET4 0.94     Lab Results   Component Value Date    HGBA1C 5.1 04/05/2018       Nutritional status:   Body mass index is 23.1 kg/m².  Lab Results   Component Value Date    ALBUMIN 2.4 (L) 04/18/2018    ALBUMIN 2.1 (L) 04/17/2018    ALBUMIN 2.2 (L) 04/16/2018     Lab Results   Component Value Date    PREALBUMIN 13 (L) 04/08/2018    PREALBUMIN 5 (L) 03/15/2018    PREALBUMIN 18 (L) 03/24/2015       Estimated Creatinine Clearance: 28.4 mL/min (A) (based on SCr of 3.1 mg/dL (H)).    Accu-Checks  Recent Labs      04/16/18 2011 04/17/18   0007  04/17/18   0417  04/17/18   0755  04/17/18   1146  04/17/18   1656  04/17/18   2026  04/17/18   2245  04/18/18   0242  04/18/18   0648   POCTGLUCOSE  192*  118*  102  110  169*  270*  303*  254*  231*  251*       Current Medications and/or Treatments Impacting Glycemic Control  Immunotherapy:  Immunosuppressants         Stop Route Frequency     tacrolimus capsule 2 mg      -- SL 2 times daily        Steroids:   Hormones     Start     Stop Route Frequency Ordered    04/06/18 0900  predniSONE tablet 7.5 mg      -- PER G TUBE Daily 04/05/18 0926        Pressors:    Autonomic Drugs     Start     Stop Route " Frequency Ordered    04/13/18 0859  midodrine tablet 10 mg      -- Oral As needed (PRN) 04/13/18 0800    04/09/18 1500  midodrine tablet 2.5 mg      -- Oral 3 times daily 04/09/18 1006        Hyperglycemia/Diabetes Medications: Antihyperglycemics     Start     Stop Route Frequency Ordered    04/11/18 0015  insulin regular (Humulin R) 100 Units in sodium chloride 0.9% 100 mL infusion     Question:  Insulin Rate Adjustment (DO NOT MODIFY ANSWER)  Answer:  [email or web address]    -- IV Continuous 04/10/18 2313    04/10/18 2221  insulin aspart U-100 pen 0-4 Units      -- SubQ Every 4 hours PRN 04/10/18 2122

## 2018-04-18 NOTE — PT/OT/SLP PROGRESS
Physical Therapy      Patient Name:  Lv Crocker   MRN:  [id number]    Patient not seen today secondary to Dialysis. Will follow-up at next scheduled session as able.    Petra Lake, PT, DPT   4/18/2018  [phone]

## 2018-04-18 NOTE — SUBJECTIVE & OBJECTIVE
Interval History: No complaints today, some hallucinations this AM. PMNR consult this AM.     Continuous Infusions:   insulin (HUMAN R) infusion (adults) 0.9 Units/hr (04/17/18 2334)     Scheduled Meds:   sodium chloride 0.9%   Intravenous Once    acetaminophen  999.0148 mg Per G Tube TID    cetirizine  5 mg Per G Tube Daily    chlorhexidine  15 mL Mouth/Throat BID    epoetin jose miguel (PROCRIT) injection  4,000 Units Intravenous Every Mon, Wed, Fri    escitalopram oxalate  10 mg Per G Tube Daily    famotidine  20 mg Per G Tube QHS    heparin (porcine)  5,000 Units Subcutaneous Q12H    levothyroxine  75 mcg Intravenous Daily    midodrine  2.5 mg Oral TID    nitroGLYCERIN 2% TD oint  0.5 inch Topical (Top) Q12H    polyethylene glycol  17 g Oral Daily    [START ON 4/19/2018] predniSONE  5 mg Per G Tube Daily    sennosides 8.8 mg/5 ml  5 mL Oral QHS    tacrolimus  2 mg Sublingual BID     PRN Meds:sodium chloride 0.9%, sodium chloride 0.9%, heparin (porcine), insulin aspart U-100, midodrine, ondansetron, oxyCODONE, povidone-iodine    Review of patient's allergies indicates:   Allergen Reactions    No known drug allergies      Objective:     Vital Signs (Most Recent):  Temp: 98.4 °F (36.9 °C) (04/18/18 0810)  Pulse: 110 (04/18/18 1200)  Resp: 18 (04/18/18 1115)  BP: 112/66 (04/18/18 1200)  SpO2: 100 % (04/18/18 1115) Vital Signs (24h Range):  Temp:  [97.5 °F (36.4 °C)-98.7 °F (37.1 °C)] 98.4 °F (36.9 °C)  Pulse:  [100-112] 110  Resp:  [17-18] 18  SpO2:  [97 %-100 %] 100 %  BP: (101-130)/(59-72) 112/66     Patient Vitals for the past 72 hrs (Last 3 readings):   Weight   04/16/18 0330 66.9 kg (147 lb 7.8 oz)     Body mass index is 23.1 kg/m².      Intake/Output Summary (Last 24 hours) at 04/18/18 1209  Last data filed at 04/17/18 1700   Gross per 24 hour   Intake           412.24 ml   Output                0 ml   Net           412.24 ml     Hemodynamic Parameters:     Physical Exam   Constitutional: He appears  well-developed and well-nourished. No distress.   HENT:   Head: Normocephalic and atraumatic.   Eyes: Conjunctivae and EOM are normal.   Cardiovascular: Regular rhythm.  Tachycardia present.    Pulmonary/Chest: He has no wheezes. He has no rales.   Abdominal: Soft. He exhibits no distension.   Musculoskeletal: He exhibits edema. He exhibits no tenderness or deformity.   Neurological: He is alert.   Skin: Skin is warm and dry. He is not diaphoretic.     Significant Labs:  CBC:    Recent Labs  Lab 04/16/18  0824 04/17/18  0810 04/18/18  0534   WBC 4.35 5.01 3.21*   RBC 2.71* 2.69* 2.48*   HGB 8.4* 8.4* 7.8*   HCT 27.7* 26.3* 25.7*    152 179   * 98 104*   MCH 31.0 31.2* 31.5*   MCHC 30.3* 31.9* 30.4*     BNP:  No results for input(s): BNP in the last 168 hours.    Invalid input(s): BNPTRIAGELBLO  CMP:    Recent Labs  Lab 04/16/18  0824 04/17/18  0554 04/18/18  0534   * 81 254*   CALCIUM 9.6 8.6* 9.9   ALBUMIN 2.2* 2.1* 2.4*   PROT 6.3 5.9* 6.7    137 134*   K 5.8* 5.3* 5.6*   CO2 28 21* 22*    107 103   BUN 49* 26* 46*   CREATININE 2.8* 1.9* 3.1*   ALKPHOS 207* 222* 230*   ALT 13 15 15   AST 18 26 16   BILITOT 0.6 0.5 0.5      Coagulation:   No results for input(s): PT, INR, APTT in the last 168 hours.  LDH:  No results for input(s): LDH in the last 72 hours.  Microbiology:  Microbiology Results (last 7 days)     Procedure Component Value Units Date/Time    Fungus culture [[id number]] Collected:  03/28/18 1607    Order Status:  Completed Specimen:  Body Fluid from Lung, RLL Updated:  04/12/18 0936     Fungus (Mycology) Culture Culture in progress     Fungus (Mycology) Culture No fungus isolated after 2 weeks          I have reviewed all pertinent labs within the past 24 hours.    Estimated Creatinine Clearance: 28.4 mL/min (A) (based on SCr of 3.1 mg/dL (H)).    Diagnostic Results:  I have reviewed and interpreted all pertinent imaging results/findings within the past 24 hours.

## 2018-04-18 NOTE — PROGRESS NOTES
[facility]  Nephrology  Progress Note    Patient Name: Lv Crocker  MRN: [id number]  Admission Date: 3/11/2018  Hospital Length of Stay: 37 days  Attending Provider: Behzad Lara Jr.,*   Primary Care Physician: Philippe Mohr MD  Principal Problem:Acute respiratory failure with hypoxia    Subjective:     HPI: Mr. Crocker is a 41 yo WM with T1DM, Hashimoto thyroiditis, h/o OHTx 11/2014, and CKD stage 4 who was admitted on 3/11/18 for persistent diarrhea. He reported a 3 week history of diarrhea up to 15x/day. Associated symptoms including URI symptoms, coughing fits, and coughing syncope. Prograf level was 14.3. His post-heart transplant course has been complicated by AMR 4/2015, CMV, post-transplant restrictive cardiomyopathy, recurrent pleural effusions/ascites requiring monthly thoracenteses/paracenteses, VATS 1/11/18 with Pleur-X catheter (now removed), and CKD stage 4 with baseline sCr 2.6-3.0. Baseline -120s and baseline BP 90s-110s/60-70s. Upon admission he was started on IVF and diarrhea workup was initiated. On 3/12 he was noted to have decreased UOP despite fluids; NS was increased to 100cc/hr. He was scheduled for a colonoscopy on 3/13 however procedure was cancelled due to hypoxia and fever. He was transferred to the ICU for closer monitoring. He continued to have oliguria overnight. Bladder scan revealed 200cc of urine but patient refused osullivan catheter placement. Wife reports that patient always has a hard time urinating again after osullivan catheters are placed/removed so he refuses them. He remained hypoxic despite FiO2 70% and ABG revealed combined respiratory and metabolic acidosis with pH 7.17. He was started on BiPAP as well as a bicarb infusion at 50cc/hr. ABG with mild improvement. AM labs revealed K 6.2 and he was shifted and given kayexalate.Trialysis catheter was placed this morning and he subsequently developed hypotension and was started on levophed.  He was intubated later this morning. Nephrology consulted for CACHORRO. All history obtained by primary team and chart review as patient was intubated/sedated on exam. Consent for dialysis obtained by patient's wife and placed in chart. Of note, both of patient's parents were on dialysis.     Interval History:   Seen on dialysis this morning and tolerating well without problems.  Tx time increased to 3.5 hours to improve K clearance today.  On Nepro tube feeds.  Oxygenating well on TC    Review of patient's allergies indicates:   Allergen Reactions    No known drug allergies      Current Facility-Administered Medications   Medication Frequency    0.9%  NaCl infusion PRN    0.9%  NaCl infusion PRN    acetaminophen oral solution 999.0148 mg TID    cetirizine tablet 5 mg Daily    chlorhexidine 0.12 % solution 15 mL BID    epoetin jose miguel injection 4,000 Units Every Mon, Wed, Fri    escitalopram oxalate tablet 10 mg Daily    famotidine tablet 20 mg QHS    heparin (porcine) injection 1,000 Units PRN    heparin (porcine) injection 5,000 Units Q12H    insulin aspart U-100 pen 0-4 Units Q4H PRN    insulin regular (Humulin R) 100 Units in sodium chloride 0.9% 100 mL infusion Continuous    levothyroxine injection 75 mcg Daily    midodrine tablet 10 mg PRN    midodrine tablet 2.5 mg TID    nitroGLYCERIN 2% TD oint ointment 0.5 inch Q12H    ondansetron disintegrating tablet 4 mg Q6H PRN    oxyCODONE 5 mg/5 mL solution 5 mg Q6H PRN    polyethylene glycol packet 17 g Daily    povidone-iodine 10 % ointment PRN    [START ON 4/19/2018] predniSONE tablet 5 mg Daily    sennosides 8.8 mg/5 ml syrup 5 mL QHS    tacrolimus capsule 2 mg BID       Objective:     Vital Signs (Most Recent):  Temp: 98.4 °F (36.9 °C) (04/18/18 1339)  Pulse: (!) 111 (04/18/18 1339)  Resp: 18 (04/18/18 1339)  BP: (!) 105/56 (04/18/18 1339)  SpO2: 100 % (04/18/18 1339)  O2 Device (Oxygen Therapy): nasal cannula (04/18/18 1200) Vital Signs (24h  Range):  Temp:  [97.5 °F (36.4 °C)-98.7 °F (37.1 °C)] 98.4 °F (36.9 °C)  Pulse:  [100-112] 111  Resp:  [17-18] 18  SpO2:  [97 %-100 %] 100 %  BP: (101-130)/(56-72) 105/56     Weight: 66.9 kg (147 lb 7.8 oz) (04/16/18 0330)  Body mass index is 23.1 kg/m².  Body surface area is 1.78 meters squared.    I/O last 3 completed shifts:  In: 637 [I.V.:37; NG/GT:600]  Out: 0     Physical Exam   Constitutional: He appears well-developed and well-nourished. No distress.   HENT:   Head: Normocephalic and atraumatic.   Eyes: Conjunctivae and EOM are normal.   Cardiovascular: Regular rhythm.  Tachycardia present.    Pulmonary/Chest: He has no wheezes. He has no rales.   Abdominal: Soft. He exhibits no distension.   Musculoskeletal: He exhibits edema. He exhibits no tenderness or deformity.   Neurological: He is alert.   Skin: Skin is warm and dry. He is not diaphoretic.       Significant Labs:  CBC:   Recent Labs  Lab 04/18/18  0534   WBC 3.21*   RBC 2.48*   HGB 7.8*   HCT 25.7*      *   MCH 31.5*   MCHC 30.4*     CMP:   Recent Labs  Lab 04/18/18  0534   *   CALCIUM 9.9   ALBUMIN 2.4*   PROT 6.7   *   K 5.6*   CO2 22*      BUN 46*   CREATININE 3.1*   ALKPHOS 230*   ALT 15   AST 16   BILITOT 0.5            Assessment/Plan:     Acute renal failure with acute tubular necrosis superimposed on stage 4 chronic kidney disease    - baseline sCr 2.6-3.0 consistent with CKD stage IV  - anuric CACHORRO; likely ischemic ATN from prolonged pre-renal state (diarrhea) in setting of prograf renal vasoconstriction; cannot r/o septic ATN or Prograf toxicity  - SLED started 3/14. TDC placed 4/4/18.   - remains anuric  - Blood pressure have been stable today       Plan:  Continue procrit TIW   Continue Nepro tube feeds.    HD today for metabolic clearance and volume management with UF goal of 1L.  Discussed with HTS today, and they are requesting patient have RRT at night 2/2 patients inability to participate in therapy  after HD due to fatigue.  Will plan to place on nocturnal SLED TIW until closer to discharge to allow patient to participate in therapy.            Juaquin Ibrahim NP  Nephrology  [facility]  Pager:  [phone]

## 2018-04-18 NOTE — PLAN OF CARE
Problem: Occupational Therapy Goal  Goal: Occupational Therapy Goal  Goals to be met by: 4/23/2018    Pt will follow 75% of motor commands with <2 verbal cues for repetition of task to maximize engagement in grooming task.--MET  Pt will complete feeding with mod A.   Pt will complete supine with HOB slightly elevated to seated at EOB with mod A in prep for seated grooming task.  Pt will engage in BUE exercises in orders to increase strength to 3+/5 in BUE's to increase engagement in seated grooming task  Pt will sit at EOB for approx 10 minutes with mod A and unilateral UE support to complete grooming task  Pt will complete sit>stand from EOB with bed in lowest position with mod A in [place] [facility]     Outcome: Ongoing (interventions implemented as appropriate)  con't with goals  Delia To, LILYR

## 2018-04-18 NOTE — PROGRESS NOTES
Patient received from floor with report from SANFORD Gutierrez.  Acute dialysis began per orders via left IJ tunneled catheter.

## 2018-04-18 NOTE — PROGRESS NOTES
[facility]  Heart Transplant  Progress Note    Patient Name: Lv Crocker  MRN: [id number]  Admission Date: 3/11/2018  Hospital Length of Stay: 37 days  Attending Physician: Behzad Lara Jr.,*  Primary Care Provider: Philippe Mohr MD  Principal Problem:Acute respiratory failure with hypoxia    Subjective:     Interval History: No complaints today, some hallucinations this AM. PMNR consult this AM.     Continuous Infusions:   insulin (HUMAN R) infusion (adults) 0.9 Units/hr (04/17/18 2334)     Scheduled Meds:   sodium chloride 0.9%   Intravenous Once    acetaminophen  999.0148 mg Per G Tube TID    cetirizine  5 mg Per G Tube Daily    chlorhexidine  15 mL Mouth/Throat BID    epoetin jose miguel (PROCRIT) injection  4,000 Units Intravenous Every Mon, Wed, Fri    escitalopram oxalate  10 mg Per G Tube Daily    famotidine  20 mg Per G Tube QHS    heparin (porcine)  5,000 Units Subcutaneous Q12H    levothyroxine  75 mcg Intravenous Daily    midodrine  2.5 mg Oral TID    nitroGLYCERIN 2% TD oint  0.5 inch Topical (Top) Q12H    polyethylene glycol  17 g Oral Daily    [START ON 4/19/2018] predniSONE  5 mg Per G Tube Daily    sennosides 8.8 mg/5 ml  5 mL Oral QHS    tacrolimus  2 mg Sublingual BID     PRN Meds:sodium chloride 0.9%, sodium chloride 0.9%, heparin (porcine), insulin aspart U-100, midodrine, ondansetron, oxyCODONE, povidone-iodine    Review of patient's allergies indicates:   Allergen Reactions    No known drug allergies      Objective:     Vital Signs (Most Recent):  Temp: 98.4 °F (36.9 °C) (04/18/18 0810)  Pulse: 110 (04/18/18 1200)  Resp: 18 (04/18/18 1115)  BP: 112/66 (04/18/18 1200)  SpO2: 100 % (04/18/18 1115) Vital Signs (24h Range):  Temp:  [97.5 °F (36.4 °C)-98.7 °F (37.1 °C)] 98.4 °F (36.9 °C)  Pulse:  [100-112] 110  Resp:  [17-18] 18  SpO2:  [97 %-100 %] 100 %  BP: (101-130)/(59-72) 112/66     Patient Vitals for the past 72 hrs (Last 3 readings):   Weight    04/16/18 0330 66.9 kg (147 lb 7.8 oz)     Body mass index is 23.1 kg/m².      Intake/Output Summary (Last 24 hours) at 04/18/18 1209  Last data filed at 04/17/18 1700   Gross per 24 hour   Intake           412.24 ml   Output                0 ml   Net           412.24 ml     Hemodynamic Parameters:     Physical Exam   Constitutional: He appears well-developed and well-nourished. No distress.   HENT:   Head: Normocephalic and atraumatic.   Eyes: Conjunctivae and EOM are normal.   Cardiovascular: Regular rhythm.  Tachycardia present.    Pulmonary/Chest: He has no wheezes. He has no rales.   Abdominal: Soft. He exhibits no distension.   Musculoskeletal: He exhibits edema. He exhibits no tenderness or deformity.   Neurological: He is alert.   Skin: Skin is warm and dry. He is not diaphoretic.     Significant Labs:  CBC:    Recent Labs  Lab 04/16/18  0824 04/17/18  0810 04/18/18  0534   WBC 4.35 5.01 3.21*   RBC 2.71* 2.69* 2.48*   HGB 8.4* 8.4* 7.8*   HCT 27.7* 26.3* 25.7*    152 179   * 98 104*   MCH 31.0 31.2* 31.5*   MCHC 30.3* 31.9* 30.4*     BNP:  No results for input(s): BNP in the last 168 hours.    Invalid input(s): BNPTRIAGELBLO  CMP:    Recent Labs  Lab 04/16/18  0824 04/17/18  0554 04/18/18  0534   * 81 254*   CALCIUM 9.6 8.6* 9.9   ALBUMIN 2.2* 2.1* 2.4*   PROT 6.3 5.9* 6.7    137 134*   K 5.8* 5.3* 5.6*   CO2 28 21* 22*    107 103   BUN 49* 26* 46*   CREATININE 2.8* 1.9* 3.1*   ALKPHOS 207* 222* 230*   ALT 13 15 15   AST 18 26 16   BILITOT 0.6 0.5 0.5      Coagulation:   No results for input(s): PT, INR, APTT in the last 168 hours.  LDH:  No results for input(s): LDH in the last 72 hours.  Microbiology:  Microbiology Results (last 7 days)     Procedure Component Value Units Date/Time    Fungus culture [[id number]] Collected:  03/28/18 1607    Order Status:  Completed Specimen:  Body Fluid from Lung, RLL Updated:  04/12/18 0936     Fungus (Mycology) Culture Culture in  progress     Fungus (Mycology) Culture No fungus isolated after 2 weeks          I have reviewed all pertinent labs within the past 24 hours.    Estimated Creatinine Clearance: 28.4 mL/min (A) (based on SCr of 3.1 mg/dL (H)).    Diagnostic Results:  I have reviewed and interpreted all pertinent imaging results/findings within the past 24 hours.    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 Most recently has had ongoing issues with his lungs, had gotten 2 thoracenteses, after which he underwent VATS 01/19/18 followed by pleurex catheter placement and effusion drainage 01/11/18, subsequently had it removed 02/07/18 after drainage had decreased despite multiple attempts to drain it. Has been having significant coughing fits that result in him being near-syncopal at times, although difficult to say if he has passed out or not- patient most recently was admitted to the hospital for increased AGUILAR, coughing and pre-syncope 02/11-02/14/18 at [facility].   Patient was started on antibiotics for suspected bacterial infection, with some diarrhea, bronchitis, and possible pneumonia. Ct chest showed some pleural fluid collection and after talking with Dr. James, we arranged for him to receive a diagnostic tap with IR, from which the fluid collection demonstrated no growth or significant findings at all really. Cell counts do not appear to have been sent but will recheck. Patient states since his hospital stay, yesterday had a significant coughing fit again, after which he nearly passed out, is being seen in clinic today for this. Denies any cardiopulmonary complaints, no leg swelling, has some abdominal swelling, which is moderate for him. Denies significant shortness of breath with mild exertion, had 6MWT yesterday to see if he  qualified for home O2, and his O2 sats actually improved after recovery from where he started initially. He and his wife admit he is in bed most days, not very active.     Mr. Crocker presented to the ED 3/11/18 with approximately 3 weeks of worsening diarrhea and 2-3 days of sinus pressure, drainage, and worsened cough.  He notes that he had a coughing fit last night and passed out, coughed throughout most of the night.  This also happened on 2/25/18.  He last saw Dr Ferreira in clinic on 2/20/18 where he was taken off myfortic and switched to cellcept (he hadn't been on cellcept because of leukopenia when they tried post-transplant).  Though his diarrhea had improved after his last discharge, he states it has increased now to 15x/day, clear/yellow/green, no fevers, no exacerbating foods per say.  He was taken back off the cellcept and placed back on myfortic this past week and has not noticed immediate change in diarrhea. He also reports his baseline coughing fits havent improved and are minimally responsive to tessalon pearls.  Came on last night, he lost consciousness during a fit once which is relatively normal for him, and had two more during HPI.  He notes no sick contacts but does state he's had some URI symptoms as above.  His baseline vitals are usually -120 and BP 90s/60s-110s/70s.  He reports a history of intermittent diarrhea - did have Cdiff many years ago but this smells different.  No abdominal pain, no nausea, no vomiting.  No blood in diarrhea.  Appears last c-scope in 2015 with no evidence of infection or inflammatory processes.  He does report IBS which is different that this.       * Acute respiratory failure with hypoxia    - Resolved.   - S/P Bronch and intubation 3/14 now post tracheostomy due to inability to extubate   - Pulmonology signed off. Completely weaned off vent.   - RSV positive early and completed course of Ribavarin/IVIG.  - ID had been following. Completed 7 day course of  Meropenem/Linezolid earlier in hospitalization  - Appreciate PT/OT/Wound care.        Alteration in skin integrity    - wound care following        Restrictive cardiomyopathy    - No changes from before.   - Has known history of recurrent ascites and pleural effusions   - S/P thoracentesis X 2, followed by VATS/pleurex cath placement (January 2018) with removal of pleurex (February 2018) and 3rd thoracentesis 2/14/18 with no significant findings on fluid removal.        Type 1 diabetes mellitus with stage 3 chronic kidney disease    - Appreciate Endocrine's recs  - Insulin gtt per endocrine recs        Hypothyroidism due to Hashimoto's thyroiditis    - Appreciate Endocrine's recs  - Continue Levothyroxine 150mcg per NG tube (adjusted over the weekend)        Acute renal failure with acute tubular necrosis superimposed on stage 3 chronic kidney disease    - Appreciate Nephrology recs.   - Continue middorine for pressure support  - HD per nephrology.   - Spoke with Juaquin SHEFFIELD with nephrology, will do nocturnal SLED so patient can sleep and not be fatigued and away in HD during the day instead of working with PT/OT/SLP. Has already proven he can tolerate intermittent HD so can switch back when we have a definitive discharge plan. Hopefully this will help improve sleep, and he will be more awake and have more time to work with therapy.         Anemia    - Transfused 2 units of PRBCs on 4/11 during HD  - Nephrology has resumed ProCrit         Heart transplanted    - TTE 3/26/18 showed normal graft function but has known history of restrictive CM post transplant.  - Decrease pred , Tacro switched to SL as levels consistently low. Improved today.   - Cellcept remains on hold  - HD today        Debility    - PT/OT daily.   - Expect long road to functional recovery.   - PEG tube placed and tolerating feeds.   - Nutrition following.    - switched from glucerna to novasource renal (start at 10 and increase to goal of 40 as  tolerated)        Anxiety    - Continue PTA  Escitalopram, will stop xanex and nortriptyline due to hallucinations and give sleep med and less pain meds            Kelsea Ryan PA-C  Heart Transplant  [facility]

## 2018-04-18 NOTE — PROVIDER TRANSFER
Dialysis completed. Left IJ tunneled catheter flushed with normal saline, heparinized, capped and taped. Patient dialyzed for 3.5 hours with fluid removal of 1 liter. Tolerated well with stable vital signs.

## 2018-04-18 NOTE — PT/OT/SLP PROGRESS
Occupational Therapy      Patient Name:  Lv Crocker   MRN:  [id number]    Patient not seen today secondary to Dialysis. Will follow-up as schedule allows.    Delia To OT  4/18/2018

## 2018-04-18 NOTE — ASSESSMENT & PLAN NOTE
-  2/2 prolonged and acute hospital course  -  (I) ADLs and mobility prior to admission, limited by fatigue/endurance  Recommendations  -  Encourage mobility, OOB in chair, and early ambulation as appropriate  -  PT/OT evaluate and treat  -  Pain management  -  Monitor for and prevent skin breakdown and pressure ulcers  · Early mobility, repositioning/weight shifting every 20-30 minutes when sitting, turn patient every 2 hours, proper mattress/overlay and chair cushioning, pressure relief/heel protector boots  -  DVT prophylaxis:  MARK, SCD

## 2018-04-18 NOTE — ASSESSMENT & PLAN NOTE
-  Intubated on 3/14/18--> s/p trach 3/28/18--> tolerating O2 via trach collar  -  Completed 7 day course of Meropenem/Linezolid   -  RSV positive early- completed course of Ribavarin/IVIG

## 2018-04-18 NOTE — ASSESSMENT & PLAN NOTE
- baseline sCr 2.6-3.0 consistent with CKD stage IV  - anuric CACHORRO; likely ischemic ATN from prolonged pre-renal state (diarrhea) in setting of prograf renal vasoconstriction; cannot r/o septic ATN or Prograf toxicity  - SLED started 3/14. TDC placed 4/4/18.   - remains anuric  - Blood pressure have been stable today       Plan:  Continue procrit TIW   Continue Nepro tube feeds.    HD today for metabolic clearance and volume management with UF goal of 1L.  Discussed with HTS today, and they are requesting patient have RRT at night 2/2 patients inability to participate in therapy after HD due to fatigue.  Will plan to place on nocturnal SLED TIW until closer to discharge to allow patient to participate in therapy.

## 2018-04-18 NOTE — NURSING
Rounds Report: Attended interdisciplinary rounds this afternoon with the transplant team including SW, physicians, fellows,  mid-level providers, and transplant coordinators.  Discussed plan of care which includes transferring to inpatient rehab. Discussion ensued regarding long term plan after rehab. (ie. Wife quit job to care for him or nursing home or other option)

## 2018-04-18 NOTE — SUBJECTIVE & OBJECTIVE
Interval History:   Seen on dialysis this morning and tolerating well without problems.  Tx time increased to 3.5 hours to improve K clearance today.  On Nepro tube feeds.  Oxygenating well on TC    Review of patient's allergies indicates:   Allergen Reactions    No known drug allergies      Current Facility-Administered Medications   Medication Frequency    0.9%  NaCl infusion PRN    0.9%  NaCl infusion PRN    acetaminophen oral solution 999.0148 mg TID    cetirizine tablet 5 mg Daily    chlorhexidine 0.12 % solution 15 mL BID    epoetin jose miguel injection 4,000 Units Every Mon, Wed, Fri    escitalopram oxalate tablet 10 mg Daily    famotidine tablet 20 mg QHS    heparin (porcine) injection 1,000 Units PRN    heparin (porcine) injection 5,000 Units Q12H    insulin aspart U-100 pen 0-4 Units Q4H PRN    insulin regular (Humulin R) 100 Units in sodium chloride 0.9% 100 mL infusion Continuous    levothyroxine injection 75 mcg Daily    midodrine tablet 10 mg PRN    midodrine tablet 2.5 mg TID    nitroGLYCERIN 2% TD oint ointment 0.5 inch Q12H    ondansetron disintegrating tablet 4 mg Q6H PRN    oxyCODONE 5 mg/5 mL solution 5 mg Q6H PRN    polyethylene glycol packet 17 g Daily    povidone-iodine 10 % ointment PRN    [START ON 4/19/2018] predniSONE tablet 5 mg Daily    sennosides 8.8 mg/5 ml syrup 5 mL QHS    tacrolimus capsule 2 mg BID       Objective:     Vital Signs (Most Recent):  Temp: 98.4 °F (36.9 °C) (04/18/18 1339)  Pulse: (!) 111 (04/18/18 1339)  Resp: 18 (04/18/18 1339)  BP: (!) 105/56 (04/18/18 1339)  SpO2: 100 % (04/18/18 1339)  O2 Device (Oxygen Therapy): nasal cannula (04/18/18 1200) Vital Signs (24h Range):  Temp:  [97.5 °F (36.4 °C)-98.7 °F (37.1 °C)] 98.4 °F (36.9 °C)  Pulse:  [100-112] 111  Resp:  [17-18] 18  SpO2:  [97 %-100 %] 100 %  BP: (101-130)/(56-72) 105/56     Weight: 66.9 kg (147 lb 7.8 oz) (04/16/18 0330)  Body mass index is 23.1 kg/m².  Body surface area is 1.78 meters  squared.    I/O last 3 completed shifts:  In: 637 [I.V.:37; NG/GT:600]  Out: 0     Physical Exam   Constitutional: He appears well-developed and well-nourished. No distress.   HENT:   Head: Normocephalic and atraumatic.   Eyes: Conjunctivae and EOM are normal.   Cardiovascular: Regular rhythm.  Tachycardia present.    Pulmonary/Chest: He has no wheezes. He has no rales.   Abdominal: Soft. He exhibits no distension.   Musculoskeletal: He exhibits edema. He exhibits no tenderness or deformity.   Neurological: He is alert.   Skin: Skin is warm and dry. He is not diaphoretic.       Significant Labs:  CBC:   Recent Labs  Lab 04/18/18  0534   WBC 3.21*   RBC 2.48*   HGB 7.8*   HCT 25.7*      *   MCH 31.5*   MCHC 30.4*     CMP:   Recent Labs  Lab 04/18/18  0534   *   CALCIUM 9.9   ALBUMIN 2.4*   PROT 6.7   *   K 5.6*   CO2 22*      BUN 46*   CREATININE 3.1*   ALKPHOS 230*   ALT 15   AST 16   BILITOT 0.5

## 2018-04-18 NOTE — PROGRESS NOTES
"[facility]  Endocrinology  Progress Note    Admit Date: 3/11/2018     Reason for Consult: abnormal TFTs    Surgical Procedure and Date: s/p heart transplant 2014     Diabetes diagnosis year: 7yo (T1DM)     Home Diabetes Medications:    Levemir 15u qHS  Novolog 4u AC     How often checking glucose at home? 4 times daily   BG readings on regimen: 150-200s  Hypoglycemia on the regimen?  Yes, rarely - treats appropriately (light snack, glucose tab)  Missed doses on regimen?  No        Diabetes Complications include:     Hyperglycemia, Hypoglycemia  and Diabetic chronic kidney disease          Complicating diabetes co morbidities:   N/A     HPI:   Patient is a 42 y.o. male with a diagnosis of T1DM, HTN, hypothyroidism, s/p heart transplant.  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 He required serial paracentesis and thoracentesis until he underwent a VATS with pleurX catheter placement on 1/11/2018 and removed 2/7/18.       Presented to hospital secondary to diarrhea and cough.  Upon initial labs, TSH was decreased (0.101) and free T4 1.33.  Endocrinology consulted to assist in management of these labs.  He was last seen in clinic by Kamala Vázquez NP 11/2017.   Previous hospitalization, endocrine consulted for same problem.  During that visit, recommended decreasing LT4 to 125mcg daily. Unfortunately, patient was discharged on previous dose of 150mcg daily.     Interval HPI:   Overnight events: bg elevated  Eating:   NPO  Nausea: No  Hypoglycemia and intervention: No  Fever: No  TPN and/or TF: Yes  If yes, type of TF/TPN and rate: 50cc/hr    /67   Pulse 107   Temp 98.4 °F (36.9 °C) (Oral)   Resp 18   Ht 5' 7" (1.702 m)   Wt 66.9 kg (147 lb 7.8 oz)   SpO2 100%   BMI 23.10 kg/m²       Labs Reviewed and Include      Recent Labs  Lab 04/18/18  0534   *   CALCIUM 9.9   ALBUMIN " 2.4*   PROT 6.7   *   K 5.6*   CO2 22*      BUN 46*   CREATININE 3.1*   ALKPHOS 230*   ALT 15   AST 16   BILITOT 0.5     Lab Results   Component Value Date    WBC 3.21 (L) 04/18/2018    HGB 7.8 (L) 04/18/2018    HCT 25.7 (L) 04/18/2018     (H) 04/18/2018     04/18/2018       Recent Labs  Lab 04/15/18  0743   TSH 12.752*   FREET4 0.94     Lab Results   Component Value Date    HGBA1C 5.1 04/05/2018       Nutritional status:   Body mass index is 23.1 kg/m².  Lab Results   Component Value Date    ALBUMIN 2.4 (L) 04/18/2018    ALBUMIN 2.1 (L) 04/17/2018    ALBUMIN 2.2 (L) 04/16/2018     Lab Results   Component Value Date    PREALBUMIN 13 (L) 04/08/2018    PREALBUMIN 5 (L) 03/15/2018    PREALBUMIN 18 (L) 03/24/2015       Estimated Creatinine Clearance: 28.4 mL/min (A) (based on SCr of 3.1 mg/dL (H)).    Accu-Checks  Recent Labs      04/16/18   2011  04/17/18   0007  04/17/18   0417  04/17/18   0755  04/17/18   1146  04/17/18   1656  04/17/18   2026  04/17/18   2245  04/18/18   0242  04/18/18   0648   POCTGLUCOSE  192*  118*  102  110  169*  270*  303*  254*  231*  251*       Current Medications and/or Treatments Impacting Glycemic Control  Immunotherapy:  Immunosuppressants         Stop Route Frequency     tacrolimus capsule 2 mg      -- SL 2 times daily        Steroids:   Hormones     Start     Stop Route Frequency Ordered    04/06/18 0900  predniSONE tablet 7.5 mg      -- PER G TUBE Daily 04/05/18 0926        Pressors:    Autonomic Drugs     Start     Stop Route Frequency Ordered    04/13/18 0859  midodrine tablet 10 mg      -- Oral As needed (PRN) 04/13/18 0800    04/09/18 1500  midodrine tablet 2.5 mg      -- Oral 3 times daily 04/09/18 1006        Hyperglycemia/Diabetes Medications: Antihyperglycemics     Start     Stop Route Frequency Ordered    04/11/18 0015  insulin regular (Humulin R) 100 Units in sodium chloride 0.9% 100 mL infusion     Question:  Insulin Rate Adjustment (DO NOT MODIFY  ANSWER)  Answer:  [email or web address]    -- IV Continuous 04/10/18 2313    04/10/18 2221  insulin aspart U-100 pen 0-4 Units      -- SubQ Every 4 hours PRN 04/10/18 2122          ASSESSMENT and PLAN    * Acute respiratory failure with hypoxia    Per primary  S/p trach.  Practicing with speech valve.  Remains NPO        Type 1 diabetes mellitus with stage 3 chronic kidney disease    BG goal 140-180, bg elevated     increase transition gtt to 1.3 u/hr  bg monitoring q4hr    Pt is T1DM, do not suspend insulin >1 hr   If TF held, decrease insulin rate to 0.2u/hr - known to have controlled BG on basal needs of lev 5 daily/ 0.2u/hr during this hospitalization        Discharge Recommendations:  TBD.        Hypothyroidism due to Hashimoto's thyroiditis    continue iv levothyroxine 75mcg daily  Recheck tft's late week          On enteral nutrition    TF at goal.  May increase insulin needs        CKD (chronic kidney disease), stage IV    Titrate cautiously.  Caution with insulin stacking.  Avoid hypoglycemia.        Heart transplanted    Per transplant  Avoid hypoglycemia  On PDN and prograf - could lead to prandial elevations and insulin resistance.            Ankur Keith MD  Endocrinology  [facility]

## 2018-04-18 NOTE — ASSESSMENT & PLAN NOTE
-  S/p OHTX 11/18/2014 with early post-op course complicated by hemorrhagic tamponade s/p  washout, delayed closure, pericardial window with subsequent a-fib with RVR and CACHORRO and late course complicated by ABMR (in 4/2015 s/p rituxan, PLEX, and IVIG), C.diff/CMV reactivation, CKD, and restrictive cardiomyopathy with subsequent chronic/recurrent pleural effusions and ascites previously requiring monthly paracentesis and thoracentesis until he underwent VATS with pleurX catheter placement on 1/11/2018 with removal on 2/7/2018

## 2018-04-18 NOTE — HOSPITAL COURSE
3/24/18:  Evaluated by PT and OT.  Bed mobility and transfers deferred 2/2 intubation and CRRT.   3/26/18:  Participated with PT.  Bed mobility totalA-dependent.  EOB totalA.  3/28/18:  Participated with PT and OT.  Bed mobility totalA-dependent.  ADLs totalA.   4/16/18:  Participated with PT and OT.  Bed mobility max-totalA/dependent.  Sit to stand totalA and transfers totalA.  EOB mod-totalA.  UBD totalA and LBD totalA.  4/17/18:  Participated with PT and SLP.  Bed mobility total-dependent.  EOB x 15 minutes mod-maxA.   4/18/18:  Participated with OT.  Bed mobility totalA.  EOB x 15 minutes totalA.    4/19/18:  Participated with PT, OT, and SLP.  Found to have cognitive-linguistic impairment and dysphonia.  Remains NPO.  Bed mobility total/dependent.  EOB x 24 minutes (static sit x ~10 seconds + ~5 seconds SV) mod-maxA.  No sit to stand, transfers, or gait.  ADLs totalA.   4/22/18:  Participated with PT and OT.  Bed mobility totalA.  EOB maxA.  Sit to stand max-totalA and transfers max-totalA.  LBD maxA.  4/23/18:  No PT or OT 2/2 HD.   4/24/18:  Participated with PT.  Bed mobility maxA.  Sit to stand and transfers maxA.  EOB maxA.   4/25/18:  Participated with OT.  Bed mobility totalA.  Bed to medi chair transfer dependent (totalA x 2).  EOB x 20 minutes maxA.  ADLs totalA.    4/26/18:  Passed MBSS.  SLP recommendation: regular diet and thin liquids (with a chin tuck in isolation of food).  Participated with PT and OT.  Bed mobility totalA.  Declined OOB activty with therapy 2/2 waiting on family to eat and wanted to be in bed for meal.  Medi chair in afternoon.  4/27/18:  Participated with PT, OT, and SLP.  Bed mobility totalA-dependent.  Sit to stand totalA-dependent.  EOB SBA-maxA.  Standing balance totalA.   4/28/18:  Participated with PT.  Bed mobility totalA-dependent.    4/30/18:  Participated with PT and OT.  Bed mobility totalA (maxA x 2).  Declined EOB 2/2 pain and fatigue from HD.  UBD totalA and LBD  totalA.    5/1/18:  Participated with PT, OT, and SLP.  Bed mobility max-totalA.  EOB SV-[place]mina.  Sit to stand totalA (maxA x 2) and transfers dependent (totalA x 2).  UBD totalA.  5/2/18:  Participated with PT and OT.  Bed mobility max-dependent (totalA x 2).  EOB CGA-modA.  Sit to stand totalA (maxA x 2).  Stood x 25-30 seconds totalA (maxA x 2).  LBD totalA.  5/3/18:  Participated with PT and OT.  Bed mobility max-totalA.  Sit to stand and transfers max-totalA.  Ambulated 3-4 steps totalA (maxA x 2).  UBD maxA and LBD totalA.   5/7/18:  Refused PT and OT 2/2 fatigue.    5/8/18:  Participated with PT, OT, and SLP.  Bed mobility maxA.  Sit to stand totalA (maxA x 2) and transfers totalA (maxA x 2).  UBD totalA and LBD totalA.  5/9/18:  No PT or OT.  Decannulated.

## 2018-04-18 NOTE — ASSESSMENT & PLAN NOTE
BG goal 140-180, bg elevated     increase transition gtt to 1.3 u/hr    Pt is T1DM, do not suspend insulin >1 hr   If TF held, decrease insulin rate to 0.2u/hr - known to have controlled BG on basal needs of lev 5 daily/ 0.2u/hr during this hospitalization        Discharge Recommendations:  TBD.

## 2018-04-18 NOTE — PLAN OF CARE
Problem: Patient Care Overview  Goal: Plan of Care Review  Outcome: Ongoing (interventions implemented as appropriate)  AA- disoriented to situation and + for hallucinations, VSS, Tele- , SpO- 97% on 5L and 25% through 6 shiley trach collar, Accuchecks q 4 h- insulin gtt at 0.9ml/hr + tube feeding at 30ml/hr, titrated up at 0000. Residual checked at 0700 this am= 240- both bags changed.   NPO + total care, low air loss mattress placed + turning q 2 hr, placing barrier cream on bottom, nitroglycerin on toes, bilateral heal boots in place for DTI   Wife to remain at bedside, pt/ot working with patient  Outpatient physical therapy medicine consult  Bed in lowest position, non skid socks worn, call light within reach. Will continue to monitor.

## 2018-04-19 PROBLEM — I96 GANGRENE: Status: ACTIVE | Noted: 2018-01-01

## 2018-04-19 NOTE — ASSESSMENT & PLAN NOTE
- Appreciate Nephrology recs.   - Continue middorine for pressure support  - HD per nephrology.    - plan for nocturnal SLED for better sleep hygiene and increased alertness during day for therapy. Can transition back to intermittent HD or afternoon HD once we have placement for patient.

## 2018-04-19 NOTE — SUBJECTIVE & OBJECTIVE
Interval History: No complaints today other than continued insomnia. Will request no vitals from 11p-6a for patient on non SLED nights for better sleep hygiene. Plan for SLED in attempt to increase energy during day and availability to work with therapy. PT and family in agreement.     Continuous Infusions:   insulin (HUMAN R) infusion (adults) 1.5 Units/hr (04/19/18 1200)     Scheduled Meds:   acetaminophen  999.0148 mg Per G Tube TID    cetirizine  5 mg Per G Tube Daily    chlorhexidine  15 mL Mouth/Throat BID    epoetin jose miguel (PROCRIT) injection  4,000 Units Intravenous Every Mon, Wed, Fri    escitalopram oxalate  10 mg Per G Tube Daily    famotidine  20 mg Per G Tube QHS    heparin (porcine)  5,000 Units Subcutaneous Q12H    levothyroxine  75 mcg Intravenous Daily    midodrine  2.5 mg Oral TID    nitroGLYCERIN 2% TD oint  0.5 inch Topical (Top) Q12H    polyethylene glycol  17 g Oral Daily    predniSONE  5 mg Per G Tube Daily    sennosides 8.8 mg/5 ml  5 mL Oral QHS    tacrolimus  2 mg Sublingual BID     PRN Meds:sodium chloride 0.9%, sodium chloride 0.9%, heparin (porcine), insulin aspart U-100, midodrine, ondansetron, oxyCODONE, povidone-iodine    Review of patient's allergies indicates:   Allergen Reactions    No known drug allergies      Objective:     Vital Signs (Most Recent):  Temp: 98.1 °F (36.7 °C) (04/19/18 1136)  Pulse: 108 (04/19/18 1136)  Resp: 17 (04/19/18 1136)  BP: (!) 105/51 (04/19/18 1136)  SpO2: 96 % (04/19/18 1136) Vital Signs (24h Range):  Temp:  [97.6 °F (36.4 °C)-99.3 °F (37.4 °C)] 98.1 °F (36.7 °C)  Pulse:  [104-116] 108  Resp:  [17-18] 17  SpO2:  [96 %-100 %] 96 %  BP: ()/(51-66) 105/51     No data found.    Body mass index is 23.1 kg/m².      Intake/Output Summary (Last 24 hours) at 04/19/18 1220  Last data filed at 04/18/18 2300   Gross per 24 hour   Intake           540.79 ml   Output                0 ml   Net           540.79 ml     Hemodynamic Parameters:      Physical Exam   Constitutional: He appears well-developed and well-nourished. No distress.   HENT:   Head: Normocephalic and atraumatic.   Eyes: Conjunctivae and EOM are normal.   Cardiovascular: Regular rhythm.  Tachycardia present.    Pulmonary/Chest: He has no wheezes. He has no rales.   Abdominal: Soft. He exhibits no distension.   Musculoskeletal: He exhibits edema. He exhibits no tenderness or deformity.   Neurological: He is alert.   Skin: Skin is warm and dry. He is not diaphoretic.     Significant Labs:  CBC:    Recent Labs  Lab 04/17/18  0810 04/18/18  0534 04/19/18  0513   WBC 5.01 3.21* 3.52*   RBC 2.69* 2.48* 2.57*   HGB 8.4* 7.8* 8.1*   HCT 26.3* 25.7* 25.2*    179 189   MCV 98 104* 98   MCH 31.2* 31.5* 31.5*   MCHC 31.9* 30.4* 32.1     BNP:  No results for input(s): BNP in the last 168 hours.    Invalid input(s): BNPTRIAGELBLO  CMP:    Recent Labs  Lab 04/17/18  0554 04/18/18  0534 04/19/18  0512   GLU 81 254* 171*   CALCIUM 8.6* 9.9 9.2   ALBUMIN 2.1* 2.4* 2.3*   PROT 5.9* 6.7 6.5    134* 132*   K 5.3* 5.6* 4.5   CO2 21* 22* 23    103 100   BUN 26* 46* 26*   CREATININE 1.9* 3.1* 2.1*   ALKPHOS 222* 230* 223*   ALT 15 15 14   AST 26 16 19   BILITOT 0.5 0.5 0.5      Coagulation:   No results for input(s): PT, INR, APTT in the last 168 hours.  LDH:  No results for input(s): LDH in the last 72 hours.  Microbiology:  Microbiology Results (last 7 days)     Procedure Component Value Units Date/Time    Fungus culture [[id number]] Collected:  03/14/18 1137    Order Status:  Completed Specimen:  Bronchial Wash from Amniotic Fluid Updated:  04/18/18 1303     Fungus (Mycology) Culture No fungus isolated after 4 weeks          I have reviewed all pertinent labs within the past 24 hours.    Estimated Creatinine Clearance: 42 mL/min (A) (based on SCr of 2.1 mg/dL (H)).    Diagnostic Results:  I have reviewed and interpreted all pertinent imaging results/findings within the past 24 hours.

## 2018-04-19 NOTE — ASSESSMENT & PLAN NOTE
- Continue PTA  Escitalopram, will stop xanex and nortriptyline due to hallucinations and give sleep med and less pain meds  - consulted Psych today for with mood and insomnia

## 2018-04-19 NOTE — CONSULTS
Unable to see consult today due to clinic and didactics for residents in the afternoon. Primary team notified and will see this patient tomorrow morning.    Tez Francis MD  [facility] -[phone]  [phone]

## 2018-04-19 NOTE — PT/OT/SLP EVAL
"Speech Language Pathology Evaluation  Cognitive Communication & Speaking Valve Treatment     Patient Name:  Lv Crocker   MRN:  [id number]  Admitting Diagnosis: Acute respiratory failure with hypoxia    Recommendations:     Recommendations:                General Recommendations:  Cognitive-linguistic therapy, One Way Speaking Valve and clinical swallow assessment  General Precautions: Standard, fall, respiratory, NPO  Communication strategies:  One way speaking valve, go to room if call light pushed   PMSV Precautions: Only wear with supervision when awake and alert, Do not sleep with valve on, Wash with soap and water    History:     Past Medical History:   Diagnosis Date    Arthritis     Ascites     Thought to be 2/2 heart tpx; liver bx 3/31/17 w/o significant fibrosis    Cardiomyopathy     Depression     Controlled "for the most part" on Lexipro    Encounter for blood transfusion     during transplant    GERD (gastroesophageal reflux disease)     Hashimoto's disease     History of CHF (congestive heart failure)     Previously EF 20% (prior to heart transplant); last EF 60%, PAP 41 as of 12/12/17; throught to be 2/2 genetics & DM1    History of coronary artery disease     H/o Coronary artery disease; resolved since heart transplant 2014    History of myocardial infarction     h/o MI x3 prior to heart transplant    HLD (hyperlipidemia) 6/11/2015    Hypoglycemia unawareness in type 1 diabetes mellitus     Hypothyroid     Initially hyperthyroid 2/2 Hoshimoto's thyroiditis; now on levothyroxine 150 mcg qd    Pleural effusion due to another disorder     Thought to be 2/2 heart tpx; s/p PleuRx catheter placement and removal after 1-2 months    Pulmonary hypertension assoc with unclear multi-factorial mechanisms 12/12/2017    PAP 41 (EF 60%) on ECHO 12/12/17    Syncope 6/30/2015    Type I diabetes mellitus, well controlled     Well controlled; Dx'd @5y/o; on N insulin 20U BID & Humalog 10U " "w/meals +SSI; Glu 89 11/9/17; A1c 7.2% 4/5/17    Unspecified essential hypertension 05/29/2014    Well controlled; Last /57 on 11/9/17       Past Surgical History:   Procedure Laterality Date    CARDIAC CATHETERIZATION      CARDIAC SURGERY      CHOLECYSTECTOMY      COLONOSCOPY N/A 3/13/2018    Procedure: COLONOSCOPY;  Surgeon: Tim Spencer MD;  Location: [facility] ([address] [place] [address]);  Service: Endoscopy;  Laterality: N/A;    CORONARY ANGIOPLASTY      FOOT SPLIT TRANSFER OF THE POSTERIOR TIBIALIS TENDON PROCEDURE      HEART TRANSPLANT  2014    KNEE SURGERY      PleuRx catheter placement  01/11/2018    Plan for removal after 1-2 months by Dr. James in cardiothoracic surgery    s/p oht  November 2014    stent placemnet         Social History: Patient lives with Spouse in [place], [place]  Occupation/hobbies/homemaking: Former . He enjoys fishing in his free time.       Subjective     SLP reviews Pt with nurse, nurse reports Pt with increased lethargy this pm  Patient presents lethargic  He explains, "I wore it [PMSV] yesterday, not today other than with therapy." Spouse explains, "I haven't seen him wear it longer than 20 or so minutes at a time  He denies pain    Pain/Comfort:  · Pain Rating 1: 0/10  · Pain Rating Post-Intervention 1: 0/10    Objective:   Pt found awake in bed, nurse and nursing student at bedside, spouse present in room. Nurse and nursing student leave room. Patient is agreeable to PMSV trial and cognitive assessment. Pt found with trach cuffless on room air.     Treatment: Pt with SpO2 at 96% prior to initiation of valve. Pt remains between 96-97 while donning valve.  Pt with mildly hoarse vocal quality initially. Pt coughs ~ 20 seconds into first trial. SLP replaces valve and Patient wears for remainder of session. Pt with mild-moderately hoarse vocal quality, mildly reduced throat clear and moderately reduced cough on command while donning valve. Pt and spouse educated " on PMSV precautions and ongoing SLP POC. Both verbalize understanding. Patient asking about further swallow assessment. SLP answers Patients questions. No further questions. Whiteboard current.  Pt explains he is ready for a nap, SLP removes PMSV and leaves with Pt tethered to trach collar.     Cognitive-Linguistic Assessment:  Cognitive Status:    Attention No obvious deficits observed with sustained attention tasks, mildly decreased divided attention noted with added distraction  Perseveration Not present  Orientation Oriented x4  Memory Immediate Recall WFL for FO3 and long term recall WFL  Problem Solving Categories 80% accuracy, MOD I and Sequencing WFL for FO3  Safety awareness intact  Managing finances pt answers math word problems for figuring change with 100% accuracy, 2 attempts  Simple calculation WFL  Reasoning Deductive reasoning WFL for FO4, verbally presented      Receptive Language:   Comprehension:      Questions Simple yes/no 100%, I'ly  Complex yes/no 70% accuracy, MOD I, across 10 attempts  Commands  One step WFL  two step basic commands 80% accuracy, I'ly, across 5 attempts    Pragmatics:    mildly flat affect, appropraite eye contact     Expressive Language:  Verbal:    Automatic Speech  Counting WFL  Naming Confrontation WFL for objects, Convergent WFL for FO3 and Divergent responsive names up to 9 items/minute in concrete category   Conversational speech occasional hesitations noted, Patient reports occasional difficulty with word-finding   Nonverbal:   Pt uses eye contact and mouthing to communicate with ST when not wearing speaking valve       Motor Speech:  No dysarthria    Voice: while donning PMSV  Quality Hoarse  Intensity decreased    Visual-Spatial:  Ongoing assessment, Pt with decreased UE mobility and unable to complete clock drawing tasks this service day    Reading:   DNA, ST to continue to assess in session.     Written Expression:   HAO 2/2 decreased UE mobility, ST to continue  to assess as status improves        Assessment:   Lv Crocker is a 42 y.o. male with an SLP diagnosis of Cognitive-Linguistic Impairment, Dysphonia, S/P Trach and One Way Speaking Valve Training.  He presents mildly decreased attention, STM, organization and higher-level comprehension skills.  Patient with moderately hoarse vocal quality while donning speaking valve this service day. ST to continue to follow.      Goals:    SLP Goals        Problem: SLP Goal    Goal Priority Disciplines Outcome   SLP Goal     SLP Ongoing (interventions implemented as appropriate)   Description:  Speech Language Pathology Goals  Goals expected to be met by 4/20/18  1.  Pt will tolerate PMSV for 30 minutes w/ no change in respiratory status and fair voicing produced.  2.  Pt will complete further evaluation of cognitive-linguistic communication skills to determine the need for additional goals. Met 4/19  3. Pt will name up to 12 items/minute in a concrete category, 90% of attempts, Supervision, to improve cognitive organization  4. Pt will recall 3 related items post 3 minute filled delay, 90% of attempts, Supervision, to improve STM   5. Pt will answer m/u YNQ with 90% accuracy, Supervision, to improve higher-level comprehension   6. Pt will complete further assessment of reading/writing skills  7. Pending MD clearance, Pt will complete further assessment of swallow fx   8. Educate Pt and family on PMSV precautions and safety awareness      Goals expected to be met by 4/13/18     1.  Pt will tolerate PMSV for 3 min w/ no change in respiratory status and fair voicing produced. Met   2.  Pt will complete further evaluation of cognitive-linguistic communication skills to determine the need for additional goals.                          Plan:   · Patient to be seen:  5 x/week   · Plan of Care expires:  05/05/18  · Plan of Care reviewed with:  patient   · SLP Follow-Up:  Yes       Discharge recommendations:  Discharge  Facility/Level Of Care Needs: rehabilitation facility   Barriers to Discharge:  Level of Skilled Assistance Needed     Time Tracking:   SLP Treatment Date:   04/19/18  Speech Start Time:  1613  Speech Stop Time:  1643     Speech Total Time (min):  30 min    Billable Minutes: Eval 15  and Therapeutic Speech Device 15    RAO Delarosa, [facility]-SLP  Speech-Language Pathology  Pager: [phone]    04/19/2018

## 2018-04-19 NOTE — SUBJECTIVE & OBJECTIVE
"Past Medical History:   Diagnosis Date    Arthritis     Ascites     Thought to be 2/2 heart tpx; liver bx 3/31/17 w/o significant fibrosis    Cardiomyopathy     Depression     Controlled "for the most part" on Lexipro    Encounter for blood transfusion     during transplant    GERD (gastroesophageal reflux disease)     Hashimoto's disease     History of CHF (congestive heart failure)     Previously EF 20% (prior to heart transplant); last EF 60%, PAP 41 as of 12/12/17; throught to be 2/2 genetics & DM1    History of coronary artery disease     H/o Coronary artery disease; resolved since heart transplant 2014    History of myocardial infarction     h/o MI x3 prior to heart transplant    HLD (hyperlipidemia) 6/11/2015    Hypoglycemia unawareness in type 1 diabetes mellitus     Hypothyroid     Initially hyperthyroid 2/2 Hoshimoto's thyroiditis; now on levothyroxine 150 mcg qd    Pleural effusion due to another disorder     Thought to be 2/2 heart tpx; s/p PleuRx catheter placement and removal after 1-2 months    Pulmonary hypertension assoc with unclear multi-factorial mechanisms 12/12/2017    PAP 41 (EF 60%) on ECHO 12/12/17    Syncope 6/30/2015    Type I diabetes mellitus, well controlled     Well controlled; Dx'd @5y/o; on N insulin 20U BID & Humalog 10U w/meals +SSI; Glu 89 11/9/17; A1c 7.2% 4/5/17    Unspecified essential hypertension 05/29/2014    Well controlled; Last /57 on 11/9/17     Past Surgical History:   Procedure Laterality Date    CARDIAC CATHETERIZATION      CARDIAC SURGERY      CHOLECYSTECTOMY      COLONOSCOPY N/A 3/13/2018    Procedure: COLONOSCOPY;  Surgeon: Tim Spencer MD;  Location: [facility] ([address] [place] [address]);  Service: Endoscopy;  Laterality: N/A;    CORONARY ANGIOPLASTY      FOOT SPLIT TRANSFER OF THE POSTERIOR TIBIALIS TENDON PROCEDURE      HEART TRANSPLANT  2014    KNEE SURGERY      PleuRx catheter placement  01/11/2018    Plan for removal after 1-2 months by " Dr. James in cardiothoracic surgery    s/p oht  November 2014    stent placemnet       Review of patient's allergies indicates:   Allergen Reactions    No known drug allergies        Scheduled Medications:    acetaminophen  999.0148 mg Per G Tube TID    cetirizine  5 mg Per G Tube Daily    chlorhexidine  15 mL Mouth/Throat BID    epoetin jose miguel (PROCRIT) injection  4,000 Units Intravenous Every Mon, Wed, Fri    escitalopram oxalate  10 mg Per G Tube Daily    famotidine  20 mg Per G Tube QHS    heparin (porcine)  5,000 Units Subcutaneous Q12H    levothyroxine  75 mcg Intravenous Daily    midodrine  2.5 mg Oral TID    nitroGLYCERIN 2% TD oint  0.5 inch Topical (Top) Q12H    polyethylene glycol  17 g Oral Daily    predniSONE  5 mg Per G Tube Daily    sennosides 8.8 mg/5 ml  5 mL Oral QHS    tacrolimus  2 mg Sublingual BID       PRN Medications: sodium chloride 0.9%, sodium chloride 0.9%, heparin (porcine), insulin aspart U-100, midodrine, ondansetron, oxyCODONE, povidone-iodine    Family History     Problem Relation (Age of Onset)    Cancer Brother (50)    Diabetes Mother, Father, Brother, Son, Sister, Maternal Uncle, Paternal Aunt, Maternal Grandmother, Maternal Grandfather    Heart disease Mother, Brother    Hypertension Mother, Father, Brother    Kidney disease Mother, Father    Stroke Father, Brother    Vision loss Brother        Social History Main Topics    Smoking status: Never Smoker    Smokeless tobacco: Never Used    Alcohol use No    Drug use: No    Sexual activity: Yes     Partners: Female     Review of Systems   Constitutional: Positive for activity change. Negative for chills, fatigue and fever.   HENT: Negative for drooling, hearing loss, trouble swallowing and voice change.    Eyes: Negative for pain and visual disturbance.   Respiratory: Negative for cough and wheezing.         + trach   Cardiovascular: Negative for chest pain and palpitations.   Gastrointestinal: Negative for  abdominal pain, nausea and vomiting.        + PEG   Genitourinary: Negative for difficulty urinating and flank pain.   Musculoskeletal: Positive for gait problem and myalgias. Negative for arthralgias, back pain and neck pain.   Skin: Positive for color change and wound. Negative for rash.   Allergic/Immunologic: Positive for immunocompromised state.   Neurological: Positive for weakness. Negative for dizziness, numbness and headaches.   Psychiatric/Behavioral: Positive for hallucinations and sleep disturbance. Negative for agitation. The patient is not nervous/anxious.      Objective:     Vital Signs (Most Recent):  Temp: 98.1 °F (36.7 °C) (04/19/18 1136)  Pulse: 108 (04/19/18 1136)  Resp: 17 (04/19/18 1136)  BP: (!) 105/51 (04/19/18 1136)  SpO2: 98 % (04/19/18 1222)    Vital Signs (24h Range):  Temp:  [97.6 °F (36.4 °C)-99.3 °F (37.4 °C)] 98.1 °F (36.7 °C)  Pulse:  [104-116] 108  Resp:  [17-18] 17  SpO2:  [96 %-100 %] 98 %  BP: ()/(51-66) 105/51     Body mass index is 23.1 kg/m².    Physical Exam   Constitutional: He is oriented to person, place, and time. He appears well-developed. He appears ill. No distress.   HENT:   Head: Normocephalic and atraumatic.   Right Ear: External ear normal.   Left Ear: External ear normal.   Nose: Nose normal.   Eyes: Right eye exhibits no discharge. Left eye exhibits no discharge. No scleral icterus.   Neck: Neck supple.   Trach intact.    Cardiovascular: Normal rate, regular rhythm and intact distal pulses.    Pulmonary/Chest: Effort normal. No respiratory distress. He has no wheezes.   Abdominal: Soft. He exhibits no distension. There is no tenderness.   PEG intact, incision LETICIA, CDI   Musculoskeletal: He exhibits no edema or tenderness.        Left hand: He exhibits decreased range of motion and swelling.   R foot: R great toe and R 3rd toe with necrotic tissue  L foot: small amount of necrotic tissue to L great toe  Overall deconditioning   Neurological: He is alert and  oriented to person, place, and time.   -  Mental Status:  AAOx3.  Follows commands.  Answers correct age and .  Recent and remote memory intact.  -  Speech and language:  no aphasia or dysarthria.    -  Motor:  RUE: 2/5, 3/5 .  LUE: 2+/5, 3/5 .  RLE: 2/5, DF 3+/5, PF 3+/5.  LLE: 2/5, DF 2/5, PF 3+/5.  -  Sensory:  Intact to light touch and pin prick.   Skin: Skin is warm and dry. No rash noted.   Psychiatric: His behavior is normal. Thought content normal. His affect is blunt.   Vitals reviewed.    NEUROLOGICAL EXAMINATION:     MENTAL STATUS   Oriented to person, place, and time.       Diagnostic Results:   Labs: Reviewed  X-Ray: Reviewed  US: Reviewed  CT: Reviewed

## 2018-04-19 NOTE — CONSULTS
[facility]  Physical Medicine & Rehab  Consult Note    Patient Name: Lv Crocker  MRN: [id number]  Admission Date: 3/11/2018  Hospital Length of Stay: 38 days  Attending Physician: Behzad Lara Jr.,*     Inpatient consult to Physical Medicine & Rehabilitation  Consult performed by: Pinky Garcia NP  Consult requested by:  Behzad Lara Jr.,*    Collaborating Physician: Iliana Martel MD  Reason for Consult:  Rehab evaluation     Consults  Subjective:     Principal Problem: Acute respiratory failure with hypoxia    HPI: Lv Crocker is a 42-year-old male with PMHx of DMI, HLD, GERD, depression, arthritis, hashimoto's thyroiditis, CAD, CHF, and cardiomyopathy s/p OHTX 11/18/2014 with early post-op course complicated by hemorrhagic tamponade s/p  washout, delayed closure, pericardial window with subsequent a-fib with RVR and CACHORRO and late course complicated by ABMR (in 4/2015 s/p rituxan, PLEX, and IVIG), C.diff/CMV reactivation, CKD IV, and restrictive cardiomyopathy with subsequent chronic/recurrent pleural effusions and ascites previously requiring monthly paracentesis and thoracentesis until he underwent VATS with pleurX catheter placement on 1/11/2018 with removal on 2/7/2018.  Patient [place] [facility] [place] for ~3 weeks of worsening diarrhea and ~3 days of worsening cough with sinus pressure and drainage, SOB, and fevers.  On arrival, he was started on IVF and evaluated by GI for diarrhea work-up.  EGD/colonoscopy attempted; however, procedure aborted 2/2 SOB and hypoxia, and patient was transferred to ICU on 3/13/18 for acute hypoxic respiratory failure ultimately resulting in intubation on 3/14/18.  Found to have multifocal pneumonia 2/2 RSV-A pneumonia with suspected superimposed bacterial pneumonia and completed course of Meropenem/Linezolid and Ribavarin/IVIG.  Hospital course further complicated by CACHORRO on CKD requiring CRRT and now HD s/p perm-a-cath placement  "on 4/4/18 (nephrology following), shock requiring intermittent pressor support, DIC, several failed extubation s/p tracheostomy on 3/28/18, dysphagia s/p PEG on 4/4/18, anemia requiring transfusions, anxiety and delirium, and debility/deconditioning.      Functional History:  Patient lives in [place], [place], with his wife and 15-year-old son in a single story home with 7 steps to enter.  Prior to admission, he was (I) with ADLs and mobility; however, mobility limited by fatigue/endurance.  Reported over last ~6 months with limited activity.  Most days spent indoors, in which he ambulated to and from bathroom.  Completed inpatient rehab program after transplant in 2014 with subsequent home health therapy.  Reported no further therapy since that time.  DME: [employer].    Hospital Course:   3/24/18:  Evaluated by PT and OT.  Bed mobility and transfers deferred 2/2 intubation and CRRT.   3/26/18:  Participated with PT.  Bed mobility totalA-dependent.  EOB totalA.  3/28/18:  Participated with PT and OT.  Bed mobility totalA-dependent.  ADLs totalA.   4/16/18:  Participated with PT and OT.  Bed mobility max-totalA/dependent.  Sit to stand totalA and transfers totalA.  EOB mod-totalA.  UBD totalA and LBD totalA.  4/17/18:  Participated with PT and SLP.  Bed mobility total-dependent.  EOB x 15 minutes mod-maxA.   4/18/18:  Participated with OT.  Bed mobility totalA.  EOB x 15 minutes totalA.      Past Medical History:   Diagnosis Date    Arthritis     Ascites     Thought to be 2/2 heart tpx; liver bx 3/31/17 w/o significant fibrosis    Cardiomyopathy     Depression     Controlled "for the most part" on Lexipro    Encounter for blood transfusion     during transplant    GERD (gastroesophageal reflux disease)     Hashimoto's disease     History of CHF (congestive heart failure)     Previously EF 20% (prior to heart transplant); last EF 60%, PAP 41 as of 12/12/17; throught to be 2/2 genetics & DM1    History of coronary " artery disease     H/o Coronary artery disease; resolved since heart transplant 2014    History of myocardial infarction     h/o MI x3 prior to heart transplant    HLD (hyperlipidemia) 6/11/2015    Hypoglycemia unawareness in type 1 diabetes mellitus     Hypothyroid     Initially hyperthyroid 2/2 Hoshimoto's thyroiditis; now on levothyroxine 150 mcg qd    Pleural effusion due to another disorder     Thought to be 2/2 heart tpx; s/p PleuRx catheter placement and removal after 1-2 months    Pulmonary hypertension assoc with unclear multi-factorial mechanisms 12/12/2017    PAP 41 (EF 60%) on ECHO 12/12/17    Syncope 6/30/2015    Type I diabetes mellitus, well controlled     Well controlled; Dx'd @7y/o; on N insulin 20U BID & Humalog 10U w/meals +SSI; Glu 89 11/9/17; A1c 7.2% 4/5/17    Unspecified essential hypertension 05/29/2014    Well controlled; Last /57 on 11/9/17     Past Surgical History:   Procedure Laterality Date    CARDIAC CATHETERIZATION      CARDIAC SURGERY      CHOLECYSTECTOMY      COLONOSCOPY N/A 3/13/2018    Procedure: COLONOSCOPY;  Surgeon: Tim Spencer MD;  Location: [facility] ([address] [place] [address]);  Service: Endoscopy;  Laterality: N/A;    CORONARY ANGIOPLASTY      FOOT SPLIT TRANSFER OF THE POSTERIOR TIBIALIS TENDON PROCEDURE      HEART TRANSPLANT  2014    KNEE SURGERY      PleuRx catheter placement  01/11/2018    Plan for removal after 1-2 months by Dr. James in cardiothoracic surgery    s/p oht  November 2014    stent placemnet       Review of patient's allergies indicates:   Allergen Reactions    No known drug allergies        Scheduled Medications:    acetaminophen  999.0148 mg Per G Tube TID    cetirizine  5 mg Per G Tube Daily    chlorhexidine  15 mL Mouth/Throat BID    epoetin jose miguel (PROCRIT) injection  4,000 Units Intravenous Every Mon, Wed, Fri    escitalopram oxalate  10 mg Per G Tube Daily    famotidine  20 mg Per G Tube QHS    heparin (porcine)  5,000 Units  Subcutaneous Q12H    levothyroxine  75 mcg Intravenous Daily    midodrine  2.5 mg Oral TID    nitroGLYCERIN 2% TD oint  0.5 inch Topical (Top) Q12H    polyethylene glycol  17 g Oral Daily    predniSONE  5 mg Per G Tube Daily    sennosides 8.8 mg/5 ml  5 mL Oral QHS    tacrolimus  2 mg Sublingual BID       PRN Medications: sodium chloride 0.9%, sodium chloride 0.9%, heparin (porcine), insulin aspart U-100, midodrine, ondansetron, oxyCODONE, povidone-iodine    Family History     Problem Relation (Age of Onset)    Cancer Brother (50)    Diabetes Mother, Father, Brother, Son, Sister, Maternal Uncle, Paternal Aunt, Maternal Grandmother, Maternal Grandfather    Heart disease Mother, Brother    Hypertension Mother, Father, Brother    Kidney disease Mother, Father    Stroke Father, Brother    Vision loss Brother        Social History Main Topics    Smoking status: Never Smoker    Smokeless tobacco: Never Used    Alcohol use No    Drug use: No    Sexual activity: Yes     Partners: Female     Review of Systems   Constitutional: Positive for activity change. Negative for chills, fatigue and fever.   HENT: Negative for drooling, hearing loss, trouble swallowing and voice change.    Eyes: Negative for pain and visual disturbance.   Respiratory: Negative for cough and wheezing.         + trach   Cardiovascular: Negative for chest pain and palpitations.   Gastrointestinal: Negative for abdominal pain, nausea and vomiting.        + PEG   Genitourinary: Negative for difficulty urinating and flank pain.   Musculoskeletal: Positive for gait problem and myalgias. Negative for arthralgias, back pain and neck pain.   Skin: Positive for color change and wound. Negative for rash.   Allergic/Immunologic: Positive for immunocompromised state.   Neurological: Positive for weakness. Negative for dizziness, numbness and headaches.   Psychiatric/Behavioral: Positive for hallucinations and sleep disturbance. Negative for agitation.  The patient is not nervous/anxious.      Objective:     Vital Signs (Most Recent):  Temp: 98.1 °F (36.7 °C) (18 1136)  Pulse: 108 (18 1136)  Resp: 17 (18 1136)  BP: (!) 105/51 (18 1136)  SpO2: 98 % (18 1222)    Vital Signs (24h Range):  Temp:  [97.6 °F (36.4 °C)-99.3 °F (37.4 °C)] 98.1 °F (36.7 °C)  Pulse:  [104-116] 108  Resp:  [17-18] 17  SpO2:  [96 %-100 %] 98 %  BP: ()/(51-66) 105/51     Body mass index is 23.1 kg/m².    Physical Exam   Constitutional: He is oriented to person, place, and time. He appears well-developed. He appears ill. No distress.   HENT:   Head: Normocephalic and atraumatic.   Right Ear: External ear normal.   Left Ear: External ear normal.   Nose: Nose normal.   Eyes: Right eye exhibits no discharge. Left eye exhibits no discharge. No scleral icterus.   Neck: Neck supple.   Trach intact.    Cardiovascular: Normal rate, regular rhythm and intact distal pulses.    Pulmonary/Chest: Effort normal. No respiratory distress. He has no wheezes.   Abdominal: Soft. He exhibits no distension. There is no tenderness.   PEG intact, incision LETICIA, CDI   Musculoskeletal: He exhibits no edema or tenderness.        Left hand: He exhibits decreased range of motion and swelling.   R foot: R great toe and R 3rd toe with necrotic tissue  L foot: small amount of necrotic tissue to L great toe  Overall deconditioning   Neurological: He is alert and oriented to person, place, and time.   -  Mental Status:  AAOx3.  Follows commands.  Answers correct age and .  Recent and remote memory intact.  -  Speech and language:  no aphasia or dysarthria.    -  Motor:  RUE: 2/5, 3/5 .  LUE: 2+/5, 3/5 .  RLE: 2/5, DF 3+/5, PF 3+/5.  LLE: 2/5, DF 2/5, PF 3+/5.  -  Sensory:  Intact to light touch and pin prick.   Skin: Skin is warm and dry. No rash noted.   Psychiatric: His behavior is normal. Thought content normal. His affect is blunt.   Vitals reviewed.    Diagnostic Results:  "  Labs: Reviewed  X-Ray: Reviewed  US: Reviewed  CT: Reviewed    Assessment/Plan:     * Acute respiratory failure with hypoxia    -  Intubated on 3/14/18--> s/p trach 3/28/18--> tolerating O2 via trach collar  -  Completed 7 day course of Meropenem/Linezolid   -  RSV positive early- completed course of Ribavarin/IVIG        Severe malnutrition    -  S/p PEG placement on 4/4/18  -  On continuous TF        Type 1 diabetes mellitus with stage 3 chronic kidney disease    -  Endocrine following  -  On insulin gtt        Acute renal failure with acute tubular necrosis superimposed on stage 3 chronic kidney disease    -  Nephrology following "anuric CACHORRO; likely ischemic ATN 2/2 prolonged pre-renal state (diarrhea) in setting of prograf renal vasoconstriction; cannot r/o septic ATN or Prograf toxicity"  -  On HD  -  S/p perm-a-cath placement on 4/4/18        Heart transplanted    -  S/p OHTX 11/18/2014 with early post-op course complicated by hemorrhagic tamponade s/p  washout, delayed closure, pericardial window with subsequent a-fib with RVR and CACHORRO and late course complicated by ABMR (in 4/2015 s/p rituxan, PLEX, and IVIG), C.diff/CMV reactivation, CKD, and restrictive cardiomyopathy with subsequent chronic/recurrent pleural effusions and ascites previously requiring monthly paracentesis and thoracentesis until he underwent VATS with pleurX catheter placement on 1/11/2018 with removal on 2/7/2018        Debility    -  2/2 prolonged and acute hospital course  -  (I) ADLs and mobility prior to admission, limited by fatigue/endurance  Recommendations  -  Encourage mobility, OOB in chair, and early ambulation as appropriate  -  PT/OT evaluate and treat  -  Pain management  -  Monitor for and prevent skin breakdown and pressure ulcers  · Early mobility, repositioning/weight shifting every 20-30 minutes when sitting, turn patient every 2 hours, proper mattress/overlay and chair cushioning, pressure relief/heel protector " boots  -  DVT prophylaxis:  MARK, SCD        Patient with rehab goals; however, he is severely debilitated and is unlikely to tolerate inpatient rehab program at this time.  Currently, [facility] does not have in-house dialysis and there are not local outpatient dialysis facilities that accommodate patients on trach collar.  [facility] will have HD available in new facility, which will be opening for patient care in the next several weeks.  Recommend continuing PT and OT daily/at least 5 days per week if possible at this time.  If patient is de-cannulated prior to that time, will re-assess for potential earlier rehab admission.  Will continue to follow progress and discuss with rehab team for further rehab recommendations.     Thank you for your consult.     RENETTA Caldwell  Department of Physical Medicine & Rehab  [facility]

## 2018-04-19 NOTE — SUBJECTIVE & OBJECTIVE
"Scheduled Meds:   acetaminophen  999.0148 mg Per G Tube TID    cetirizine  5 mg Per G Tube Daily    chlorhexidine  15 mL Mouth/Throat BID    epoetin jose miguel (PROCRIT) injection  4,000 Units Intravenous Every Mon, Wed, Fri    escitalopram oxalate  10 mg Per G Tube Daily    famotidine  20 mg Per G Tube QHS    heparin (porcine)  5,000 Units Subcutaneous Q12H    levothyroxine  75 mcg Intravenous Daily    midodrine  2.5 mg Oral TID    nitroGLYCERIN 2% TD oint  0.5 inch Topical (Top) Q12H    polyethylene glycol  17 g Oral Daily    predniSONE  5 mg Per G Tube Daily    sennosides 8.8 mg/5 ml  5 mL Oral QHS    tacrolimus  2 mg Sublingual BID     Continuous Infusions:   insulin (HUMAN R) infusion (adults) 1.5 Units/hr (04/19/18 1200)     PRN Meds:sodium chloride 0.9%, sodium chloride 0.9%, heparin (porcine), insulin aspart U-100, midodrine, ondansetron, oxyCODONE, povidone-iodine    Review of patient's allergies indicates:   Allergen Reactions    No known drug allergies         Past Medical History:   Diagnosis Date    Arthritis     Ascites     Thought to be 2/2 heart tpx; liver bx 3/31/17 w/o significant fibrosis    Cardiomyopathy     Depression     Controlled "for the most part" on Lexipro    Encounter for blood transfusion     during transplant    GERD (gastroesophageal reflux disease)     Hashimoto's disease     History of CHF (congestive heart failure)     Previously EF 20% (prior to heart transplant); last EF 60%, PAP 41 as of 12/12/17; throught to be 2/2 genetics & DM1    History of coronary artery disease     H/o Coronary artery disease; resolved since heart transplant 2014    History of myocardial infarction     h/o MI x3 prior to heart transplant    HLD (hyperlipidemia) 6/11/2015    Hypoglycemia unawareness in type 1 diabetes mellitus     Hypothyroid     Initially hyperthyroid 2/2 Hoshimoto's thyroiditis; now on levothyroxine 150 mcg qd    Pleural effusion due to another disorder     " Thought to be 2/2 heart tpx; s/p PleuRx catheter placement and removal after 1-2 months    Pulmonary hypertension assoc with unclear multi-factorial mechanisms 12/12/2017    PAP 41 (EF 60%) on ECHO 12/12/17    Syncope 6/30/2015    Type I diabetes mellitus, well controlled     Well controlled; Dx'd @7y/o; on N insulin 20U BID & Humalog 10U w/meals +SSI; Glu 89 11/9/17; A1c 7.2% 4/5/17    Unspecified essential hypertension 05/29/2014    Well controlled; Last /57 on 11/9/17     Past Surgical History:   Procedure Laterality Date    CARDIAC CATHETERIZATION      CARDIAC SURGERY      CHOLECYSTECTOMY      COLONOSCOPY N/A 3/13/2018    Procedure: COLONOSCOPY;  Surgeon: Tim Spencer MD;  Location: [facility] ([address] [place] [address]);  Service: Endoscopy;  Laterality: N/A;    CORONARY ANGIOPLASTY      FOOT SPLIT TRANSFER OF THE POSTERIOR TIBIALIS TENDON PROCEDURE      HEART TRANSPLANT  2014    KNEE SURGERY      PleuRx catheter placement  01/11/2018    Plan for removal after 1-2 months by Dr. James in cardiothoracic surgery    s/p oht  November 2014    stent placemnet         Family History     Problem Relation (Age of Onset)    Cancer Brother (50)    Diabetes Mother, Father, Brother, Son, Sister, Maternal Uncle, Paternal Aunt, Maternal Grandmother, Maternal Grandfather    Heart disease Mother, Brother    Hypertension Mother, Father, Brother    Kidney disease Mother, Father    Stroke Father, Brother    Vision loss Brother        Social History Main Topics    Smoking status: Never Smoker    Smokeless tobacco: Never Used    Alcohol use No    Drug use: No    Sexual activity: Yes     Partners: Female     Review of Systems   Unable to perform ROS: Mental status change     Objective:     Vital Signs (Most Recent):  Temp: 98.1 °F (36.7 °C) (04/19/18 1136)  Pulse: 108 (04/19/18 1136)  Resp: 17 (04/19/18 1136)  BP: (!) 105/51 (04/19/18 1136)  SpO2: 98 % (04/19/18 1222) Vital Signs (24h Range):  Temp:  [97.6 °F (36.4  °C)-99.3 °F (37.4 °C)] 98.1 °F (36.7 °C)  Pulse:  [104-116] 108  Resp:  [17-18] 17  SpO2:  [96 %-100 %] 98 %  BP: ()/(51-66) 105/51     Weight:  (bed needs to be zeroed if pt gets up for PT)  Body mass index is 23.1 kg/m².    Foot Exam    General  Orientation: disoriented       Right Foot/Ankle     Inspection and Palpation  Tenderness: great toe metatarsophalangeal joint   Skin Exam: skin changes and abnormal color;     Neurovascular  Dorsalis pedis: 1+  Posterior tibial: 1+  Saphenous nerve sensation: diminished  Tibial nerve sensation: diminished  Superficial peroneal nerve sensation: diminished  Deep peroneal nerve sensation: diminished  Sural nerve sensation: diminished      Left Foot/Ankle      Inspection and Palpation  Tenderness: great toe interphalangeal joint and great toe metatarsophalangeal joint   Skin Exam: skin changes;     Neurovascular  Dorsalis pedis: 1+  Posterior tibial: 1+  Saphenous nerve sensation: diminished  Tibial nerve sensation: diminished  Superficial peroneal nerve sensation: diminished  Deep peroneal nerve sensation: diminished  Sural nerve sensation: diminished          Gangrene noted to hallux and 3rd toe. Stable no purulent drainage noted. Ischemic changes noted to right second toe. Pain upon palpation to right hallux and 3rd toe.           Pre ulcerative lesion right posterior leg.       Mild ischemic changes noted to left hallux. CFT <3s. No purulent drainage noted.             Preulcerative lesion left heel.           Laboratory:  CBC:   Recent Labs  Lab 04/19/18  0513   WBC 3.52*   RBC 2.57*   HGB 8.1*   HCT 25.2*      MCV 98   MCH 31.5*   MCHC 32.1     CMP:   Recent Labs  Lab 04/19/18  0512   *   CALCIUM 9.2   ALBUMIN 2.3*   PROT 6.5   *   K 4.5   CO2 23      BUN 26*   CREATININE 2.1*   ALKPHOS 223*   ALT 14   AST 19   BILITOT 0.5       Diagnostic Results:  Xray B/L feet ordered    Clinical Findings:  Dry stable gangrene noted to right hallux and  3rd toe. Ischemic changes noted to right 2nd and left hallux.

## 2018-04-19 NOTE — CONSULTS
[facility]  Podiatry  Consult Note    Patient Name: Lv Crocker  MRN: [id number]  Admission Date: 3/11/2018  Hospital Length of Stay: 38 days  Attending Physician: Behzad Lara Jr.,*  Primary Care Provider: Philippe Mohr MD     Inpatient consult to Podiatry  Consult performed by: NIMISHA PRESTON  Consult ordered by: CORTNEY LATIF  Reason for consult: rehab requests patient be seen by podietry for toe wounds (due to pressors)        Subjective:     History of Present Illness:  Patient is a 42 y.o. male with a diagnosis of T1DM, HTN, hypothyroidism, s/p heart transplant. Consulted for B/L feet gangrene. History obtained from wife present at bedside. Pt. Reports began to notice darkening to toes one month ago during hospital admission after patient on pressors. Pt. Complains of pain to right great toe and third toe and pain to left great toe as well. Wife reports patient is unable to bear weight during rehab sessions due to severe pain at right great toe and third toe and left great toe. Heel protector boots intact B/L.     Scheduled Meds:   acetaminophen  999.0148 mg Per G Tube TID    cetirizine  5 mg Per G Tube Daily    chlorhexidine  15 mL Mouth/Throat BID    epoetin jose miguel (PROCRIT) injection  4,000 Units Intravenous Every Mon, Wed, Fri    escitalopram oxalate  10 mg Per G Tube Daily    famotidine  20 mg Per G Tube QHS    heparin (porcine)  5,000 Units Subcutaneous Q12H    levothyroxine  75 mcg Intravenous Daily    midodrine  2.5 mg Oral TID    nitroGLYCERIN 2% TD oint  0.5 inch Topical (Top) Q12H    polyethylene glycol  17 g Oral Daily    predniSONE  5 mg Per G Tube Daily    sennosides 8.8 mg/5 ml  5 mL Oral QHS    tacrolimus  2 mg Sublingual BID     Continuous Infusions:   insulin (HUMAN R) infusion (adults) 1.5 Units/hr (04/19/18 1200)     PRN Meds:sodium chloride 0.9%, sodium chloride 0.9%, heparin (porcine), insulin aspart U-100, midodrine, ondansetron,  "oxyCODONE, povidone-iodine    Review of patient's allergies indicates:   Allergen Reactions    No known drug allergies         Past Medical History:   Diagnosis Date    Arthritis     Ascites     Thought to be 2/2 heart tpx; liver bx 3/31/17 w/o significant fibrosis    Cardiomyopathy     Depression     Controlled "for the most part" on Lexipro    Encounter for blood transfusion     during transplant    GERD (gastroesophageal reflux disease)     Hashimoto's disease     History of CHF (congestive heart failure)     Previously EF 20% (prior to heart transplant); last EF 60%, PAP 41 as of 12/12/17; throught to be 2/2 genetics & DM1    History of coronary artery disease     H/o Coronary artery disease; resolved since heart transplant 2014    History of myocardial infarction     h/o MI x3 prior to heart transplant    HLD (hyperlipidemia) 6/11/2015    Hypoglycemia unawareness in type 1 diabetes mellitus     Hypothyroid     Initially hyperthyroid 2/2 Hoshimoto's thyroiditis; now on levothyroxine 150 mcg qd    Pleural effusion due to another disorder     Thought to be 2/2 heart tpx; s/p PleuRx catheter placement and removal after 1-2 months    Pulmonary hypertension assoc with unclear multi-factorial mechanisms 12/12/2017    PAP 41 (EF 60%) on ECHO 12/12/17    Syncope 6/30/2015    Type I diabetes mellitus, well controlled     Well controlled; Dx'd @5y/o; on N insulin 20U BID & Humalog 10U w/meals +SSI; Glu 89 11/9/17; A1c 7.2% 4/5/17    Unspecified essential hypertension 05/29/2014    Well controlled; Last /57 on 11/9/17     Past Surgical History:   Procedure Laterality Date    CARDIAC CATHETERIZATION      CARDIAC SURGERY      CHOLECYSTECTOMY      COLONOSCOPY N/A 3/13/2018    Procedure: COLONOSCOPY;  Surgeon: Tim Spencer MD;  Location: [facility] ([address] [place] [address]);  Service: Endoscopy;  Laterality: N/A;    CORONARY ANGIOPLASTY      FOOT SPLIT TRANSFER OF THE POSTERIOR TIBIALIS TENDON PROCEDURE   "    HEART TRANSPLANT  2014    KNEE SURGERY      PleuRx catheter placement  01/11/2018    Plan for removal after 1-2 months by Dr. James in cardiothoracic surgery    s/p oht  November 2014    stent placemnet         Family History     Problem Relation (Age of Onset)    Cancer Brother (50)    Diabetes Mother, Father, Brother, Son, Sister, Maternal Uncle, Paternal Aunt, Maternal Grandmother, Maternal Grandfather    Heart disease Mother, Brother    Hypertension Mother, Father, Brother    Kidney disease Mother, Father    Stroke Father, Brother    Vision loss Brother        Social History Main Topics    Smoking status: Never Smoker    Smokeless tobacco: Never Used    Alcohol use No    Drug use: No    Sexual activity: Yes     Partners: Female     Review of Systems   Unable to perform ROS: Mental status change     Objective:     Vital Signs (Most Recent):  Temp: 98.1 °F (36.7 °C) (04/19/18 1136)  Pulse: 108 (04/19/18 1136)  Resp: 17 (04/19/18 1136)  BP: (!) 105/51 (04/19/18 1136)  SpO2: 98 % (04/19/18 1222) Vital Signs (24h Range):  Temp:  [97.6 °F (36.4 °C)-99.3 °F (37.4 °C)] 98.1 °F (36.7 °C)  Pulse:  [104-116] 108  Resp:  [17-18] 17  SpO2:  [96 %-100 %] 98 %  BP: ()/(51-66) 105/51     Weight:  (bed needs to be zeroed if pt gets up for PT)  Body mass index is 23.1 kg/m².    Foot Exam    General  Orientation: disoriented       Right Foot/Ankle     Inspection and Palpation  Tenderness: great toe metatarsophalangeal joint   Skin Exam: skin changes and abnormal color;     Neurovascular  Dorsalis pedis: 1+  Posterior tibial: 1+  Saphenous nerve sensation: diminished  Tibial nerve sensation: diminished  Superficial peroneal nerve sensation: diminished  Deep peroneal nerve sensation: diminished  Sural nerve sensation: diminished      Left Foot/Ankle      Inspection and Palpation  Tenderness: great toe interphalangeal joint and great toe metatarsophalangeal joint   Skin Exam: skin changes;      Neurovascular  Dorsalis pedis: 1+  Posterior tibial: 1+  Saphenous nerve sensation: diminished  Tibial nerve sensation: diminished  Superficial peroneal nerve sensation: diminished  Deep peroneal nerve sensation: diminished  Sural nerve sensation: diminished          Gangrene noted to hallux and 3rd toe. Stable no purulent drainage noted. Ischemic changes noted to right second toe. Pain upon palpation to right hallux and 3rd toe.           Pre ulcerative lesion right posterior leg.       Mild ischemic changes noted to left hallux. CFT <3s. No purulent drainage noted.             Preulcerative lesion left heel.           Laboratory:  CBC:   Recent Labs  Lab 04/19/18  0513   WBC 3.52*   RBC 2.57*   HGB 8.1*   HCT 25.2*      MCV 98   MCH 31.5*   MCHC 32.1     CMP:   Recent Labs  Lab 04/19/18  0512   *   CALCIUM 9.2   ALBUMIN 2.3*   PROT 6.5   *   K 4.5   CO2 23      BUN 26*   CREATININE 2.1*   ALKPHOS 223*   ALT 14   AST 19   BILITOT 0.5       Diagnostic Results:  Xray B/L feet ordered    Clinical Findings:  Dry stable gangrene noted to right hallux and 3rd toe. Ischemic changes noted to right 2nd and left hallux.     Assessment/Plan:     Gangrene    Stable gangrene to right great toe and 3rd toe. Ischemic changes to right second toe and left hallux.   Discussed two options: Continued local wound care with monitoring of demarcation vs. Amputation of right hallux and third toe. Wife states she would like to consider amputation as patient has severe pain to right foot at gangrenous toes and this could be a barrier to him participating in rehab. Pt's wife is concerned that severe pain in toes will further impair his ambulation and rehab potential.   Painted with betadine nursing orders in.   Arterial US ordered to eval for PAD  Xray B/L foot.     Will return tomorrow to discuss results.     Weightbearing Status: WBAT B/L  Offloading Device: DARCO shoe.     Venice Hatfield DPM PGY-3  Pager:  [phone]          Type 1 diabetes mellitus with stage 3 chronic kidney disease    Per primary             Thank you for your consult. I will follow-up with patient. Please contact us if you have any additional questions.    Venice Hatfield MD  Podiatry  [facility]

## 2018-04-19 NOTE — SUBJECTIVE & OBJECTIVE
"Interval HPI:   Overnight events: none  Eating:   NPO  Nausea: No  Hypoglycemia and intervention: No  Fever: No  TPN and/or TF: Yes  If yes, type of TF/TPN and rate: 50cc/hr    /66 (BP Location: Right arm, Patient Position: Lying)   Pulse 105   Temp 97.6 °F (36.4 °C) (Oral)   Resp 18   Ht 5' 7" (1.702 m)   Wt 66.9 kg (147 lb 7.8 oz)   SpO2 100%   BMI 23.10 kg/m²     Labs Reviewed and Include      Recent Labs  Lab 04/19/18  0512   *   CALCIUM 9.2   ALBUMIN 2.3*   PROT 6.5   *   K 4.5   CO2 23      BUN 26*   CREATININE 2.1*   ALKPHOS 223*   ALT 14   AST 19   BILITOT 0.5     Lab Results   Component Value Date    WBC 3.52 (L) 04/19/2018    HGB 8.1 (L) 04/19/2018    HCT 25.2 (L) 04/19/2018    MCV 98 04/19/2018     04/19/2018       Recent Labs  Lab 04/15/18  0743   TSH 12.752*   FREET4 0.94     Lab Results   Component Value Date    HGBA1C 5.1 04/05/2018       Nutritional status:   Body mass index is 23.1 kg/m².  Lab Results   Component Value Date    ALBUMIN 2.3 (L) 04/19/2018    ALBUMIN 2.4 (L) 04/18/2018    ALBUMIN 2.1 (L) 04/17/2018     Lab Results   Component Value Date    PREALBUMIN 13 (L) 04/08/2018    PREALBUMIN 5 (L) 03/15/2018    PREALBUMIN 18 (L) 03/24/2015       Estimated Creatinine Clearance: 42 mL/min (A) (based on SCr of 2.1 mg/dL (H)).    Accu-Checks  Recent Labs      04/17/18   2026  04/17/18   2245  04/18/18   0242  04/18/18   0648  04/18/18   1045  04/18/18   1504  04/18/18   2004  04/19/18   0021  04/19/18   0414  04/19/18   0750   POCTGLUCOSE  303*  254*  231*  251*  198*  241*  221*  241*  175*  215*       Current Medications and/or Treatments Impacting Glycemic Control  Immunotherapy:  Immunosuppressants         Stop Route Frequency     tacrolimus capsule 2 mg      -- SL 2 times daily        Steroids:   Hormones     Start     Stop Route Frequency Ordered    04/19/18 0900  predniSONE tablet 5 mg      -- PER G TUBE Daily 04/18/18 1106        Pressors:    Autonomic " Drugs     Start     Stop Route Frequency Ordered    04/13/18 0859  midodrine tablet 10 mg      -- Oral As needed (PRN) 04/13/18 0800    04/09/18 1500  midodrine tablet 2.5 mg      -- Oral 3 times daily 04/09/18 1006        Hyperglycemia/Diabetes Medications: Antihyperglycemics     Start     Stop Route Frequency Ordered    04/11/18 0015  insulin regular (Humulin R) 100 Units in sodium chloride 0.9% 100 mL infusion     Question:  Insulin Rate Adjustment (DO NOT MODIFY ANSWER)  Answer:  [email or web address]    -- IV Continuous 04/10/18 2313    04/10/18 2221  insulin aspart U-100 pen 0-4 Units      -- SubQ Every 4 hours PRN 04/10/18 2122

## 2018-04-19 NOTE — ASSESSMENT & PLAN NOTE
BG goal 140-180, bg elevated     increase transition gtt to 1.5 u/hr    Pt is T1DM, do not suspend insulin >1 hr   If TF held, decrease insulin rate to 0.2u/hr - known to have controlled BG on basal needs of lev 5 daily/ 0.2u/hr during this hospitalization        Discharge Recommendations:  TBD.

## 2018-04-19 NOTE — PLAN OF CARE
Problem: Occupational Therapy Goal  Goal: Occupational Therapy Goal  Goals to be met by: 4/23/2018    Pt will follow 75% of motor commands with <2 verbal cues for repetition of task to maximize engagement in grooming task.--MET  Pt will complete feeding with mod A.   Pt will complete supine with HOB slightly elevated to seated at EOB with mod A in prep for seated grooming task.  Pt will engage in BUE exercises in orders to increase strength to 3+/5 in BUE's to increase engagement in seated grooming task  Pt will sit at EOB for approx 10 minutes with mod A and unilateral UE support to complete grooming task  Pt will complete sit>stand from EOB with bed in lowest position with mod A in prep for transfer to [facility]     Outcome: Ongoing (interventions implemented as appropriate)  con't with goals  Delia To, LILYR

## 2018-04-19 NOTE — PT/OT/SLP PROGRESS
Physical Therapy Treatment    Patient Name:  Lv Crocker   MRN:  [id number]    Recommendations:     Discharge Recommendations:  rehabilitation facility   Discharge Equipment Recommendations:  (TBD)   Barriers to discharge: Inaccessible home and Decreased caregiver support    Assessment:     Lv Crocker is a 42 y.o. male admitted with a medical diagnosis of Acute respiratory failure with hypoxia.  He presents with the following impairments/functional limitations:  weakness, impaired functional mobilty, impaired endurance, impaired self care skills, gait instability, decreased coordination, decreased upper extremity function, decreased lower extremity function, impaired balance, pain, decreased ROM, abnormal tone, impaired coordination, edema, impaired skin, impaired cardiopulmonary response to activity, impaired muscle length, impaired joint extensibility. Pt continues to require increased assist to complete bed mobility. Pt with increased fatigue this session due to lack of sleep and with intermittent dizziness, thus transfers not performed. Pt unable to stand at this time due to weakness, pain, decreased balance. Improvements noted with sitting balance and postural control, as pt able to briefly maintain sitting balance without physical assist and maintained cervical extension for longer periods of time. Pt would continue to benefit from skilled acute PT in order to address current deficits and progress functional mobility.      Rehab Prognosis:  Fair-good; patient would benefit from acute skilled PT services to address these deficits and reach maximum level of function.      Recent Surgery: Procedure(s) (LRB):  INSERTION-CATHETER-PERM-A-CATH (Left)  INSERTION-TUBE-GASTROSTOMY (N/A) 15 Days Post-Op    Plan:     During this hospitalization, patient to be seen 5 x/week to address the above listed problems via gait training, therapeutic activities, therapeutic exercises, neuromuscular  re-education  · Plan of Care Expires:  05/06/18   Plan of Care Reviewed with: patient, spouse    Subjective     Communicated with RN prior to session.  Patient found supine upon PT entry to room, agreeable to treatment.      Chief Complaint: B foot pain   Patient comments/goals: progress mobility and return home   Pain/Comfort:  · Pain Rating 1: 6/10  · Location - Side 1: Bilateral  · Location - Orientation 1: distal  · Location 1: leg  · Pain Addressed 1: Reposition, Distraction    Patients cultural, spiritual, Anabaptist conflicts given the current situation: none noted     Objective:     Patient found with: tracheostomy, telemetry, peripheral IV, oxygen, PEG Tube, pressure relief boots     General Precautions: Standard, fall, NPO, respiratory   Orthopedic Precautions:N/A   Braces: N/A      Functional Mobility:  · Bed Mobility:     · Scooting: total assistance- seated scooting to EOB and supine scooting to HOB  · Supine to Sit: total assistance and of 2 persons  · Sit to Supine: total assistance and of 2 persons  · Balance:   · static sitting: mostly modA with pt able to briefly maintain with close SBA (10 sec 1st trial, 5 sec 2nd trial)   · dynamic sitting: maxA    AM-PAC 6 CLICK MOBILITY  Turning over in bed (including adjusting bedclothes, sheets and blankets)?: 2  Sitting down on and standing up from a chair with arms (e.g., wheelchair, bedside commode, etc.): 1  Moving from lying on back to sitting on the side of the bed?: 2  Moving to and from a bed to a chair (including a wheelchair)?: 2  Need to walk in hospital room?: 1  Climbing 3-5 steps with a railing?: 1  Total Score: 9       Therapeutic Activities and Exercises:   -PMSV applied at beginning of session. Tolerated valve well throughout session with no s/s of distress.   -Performed static sitting at EOB x~24 minutes with mostly mod-maxA for sitting balance with noted posterior lean, posterior pelvic tilt, forward head, cervical flexion, rounded  shoulders. Positioned BUE to assist with sitting balance and joint approximation; required assist to maintain BUE in WB position. PT provided postural cues and assist throughout for increased thoracic and cervical extension, anterior pelvic tilt, and scapular retraction. Pt able to briefly maintain cervical extension without assist, but fatigued as session progressed. Able to briefly maintain static balance with close SBA x10 sec 1st trial and x5 sec 2nd trial. Required intermittent rest breaks 2* dizziness; cues for AP with pt performing through limited ROM. Pt with onset of nausea, requiring return to supine   -Performed contract-relax x5 reps for pec major/minor    -Performed LUE forearm>hand lateral push-ups x5 reps with assist throughout for trunk control and facilitation for triceps activation    -Performed cervical flexion/extension and rotation x10 reps each direction with assist throughout for seated balance and trunk control   -Performed UE therex with OT while PT provided postural cues and balance assist   -Engaged in extended conversation with pt, pt's wife, and PM&R NP regarding discharge planning, nature of IP rehab, and disposition.       Patient left supine with all lines intact, call button in reach and pt's wife and PM&R NP present..    GOALS:    Physical Therapy Goals        Problem: Physical Therapy Goal    Goal Priority Disciplines Outcome Goal Variances Interventions   Physical Therapy Goal     PT/OT, PT Ongoing (interventions implemented as appropriate)     Description:  Goals to be met by: 18     Patient will increase functional independence with mobility by performin. Supine to sit with Moderate Assistance.  2. Sit to supine with Moderate Assistance.  3. Rolling to Left and Right with Minimal Assistance.   4. Sit to stand transfer with Moderate Assistance.  5. Bed to chair transfer with Maximum Assistance.  6. Gait  x 5 feet with Minimal Assistance.   7.  Pt to perf and be  compliant with LE HEP to improve vascularity and mm strength.                           Time Tracking:     PT Received On: 04/19/18  PT Start Time: 1028     PT Stop Time: 1117  PT Total Time (min): 49 min     Billable Minutes: Therapeutic Activity 14 and Neuromuscular Re-education 24   (co-tx with OT)    Treatment Type: Treatment  PT/PTA: PT     PTA Visit Number: 0     Petra Lake PT, DPT   4/19/2018  [phone]

## 2018-04-19 NOTE — PLAN OF CARE
Problem: Physical Therapy Goal  Goal: Physical Therapy Goal  Goals to be met by: 18     Patient will increase functional independence with mobility by performin. Supine to sit with Moderate Assistance.  2. Sit to supine with Moderate Assistance.  3. Rolling to Left and Right with Minimal Assistance.   4. Sit to stand transfer with Moderate Assistance.  5. Bed to chair transfer with Maximum Assistance.  6. Gait  x 5 feet with Minimal Assistance.   7.  Pt to perf and be compliant with LE HEP to improve vascularity and mm strength.         Outcome: Ongoing (interventions implemented as appropriate)  Goals reviewed and remain appropriate. Pt progressing towards goals.    Petra Lake, PT, DPT   2018  [phone]

## 2018-04-19 NOTE — PROGRESS NOTES
"[facility]  Endocrinology  Progress Note    Admit Date: 3/11/2018     Reason for Consult: abnormal TFTs    Surgical Procedure and Date: s/p heart transplant 2014     Diabetes diagnosis year: 5yo (T1DM)     Home Diabetes Medications:    Levemir 15u qHS  Novolog 4u AC     How often checking glucose at home? 4 times daily   BG readings on regimen: 150-200s  Hypoglycemia on the regimen?  Yes, rarely - treats appropriately (light snack, glucose tab)  Missed doses on regimen?  No        Diabetes Complications include:     Hyperglycemia, Hypoglycemia  and Diabetic chronic kidney disease          Complicating diabetes co morbidities:   N/A     HPI:   Patient is a 42 y.o. male with a diagnosis of T1DM, HTN, hypothyroidism, s/p heart transplant.  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 He required serial paracentesis and thoracentesis until he underwent a VATS with pleurX catheter placement on 1/11/2018 and removed 2/7/18.       Presented to hospital secondary to diarrhea and cough.  Upon initial labs, TSH was decreased (0.101) and free T4 1.33.  Endocrinology consulted to assist in management of these labs.  He was last seen in clinic by Kamala Vázquez NP 11/2017.   Previous hospitalization, endocrine consulted for same problem.  During that visit, recommended decreasing LT4 to 125mcg daily. Unfortunately, patient was discharged on previous dose of 150mcg daily.     Interval HPI:   Overnight events: none  Eating:   NPO  Nausea: No  Hypoglycemia and intervention: No  Fever: No  TPN and/or TF: Yes  If yes, type of TF/TPN and rate: 50cc/hr    /66 (BP Location: Right arm, Patient Position: Lying)   Pulse 105   Temp 97.6 °F (36.4 °C) (Oral)   Resp 18   Ht 5' 7" (1.702 m)   Wt 66.9 kg (147 lb 7.8 oz)   SpO2 100%   BMI 23.10 kg/m²       Labs Reviewed and Include      Recent Labs  Lab " 04/19/18  0512   *   CALCIUM 9.2   ALBUMIN 2.3*   PROT 6.5   *   K 4.5   CO2 23      BUN 26*   CREATININE 2.1*   ALKPHOS 223*   ALT 14   AST 19   BILITOT 0.5     Lab Results   Component Value Date    WBC 3.52 (L) 04/19/2018    HGB 8.1 (L) 04/19/2018    HCT 25.2 (L) 04/19/2018    MCV 98 04/19/2018     04/19/2018       Recent Labs  Lab 04/15/18  0743   TSH 12.752*   FREET4 0.94     Lab Results   Component Value Date    HGBA1C 5.1 04/05/2018       Nutritional status:   Body mass index is 23.1 kg/m².  Lab Results   Component Value Date    ALBUMIN 2.3 (L) 04/19/2018    ALBUMIN 2.4 (L) 04/18/2018    ALBUMIN 2.1 (L) 04/17/2018     Lab Results   Component Value Date    PREALBUMIN 13 (L) 04/08/2018    PREALBUMIN 5 (L) 03/15/2018    PREALBUMIN 18 (L) 03/24/2015       Estimated Creatinine Clearance: 42 mL/min (A) (based on SCr of 2.1 mg/dL (H)).    Accu-Checks  Recent Labs      04/17/18   2026  04/17/18   2245  04/18/18   0242  04/18/18   0648  04/18/18   1045  04/18/18   1504  04/18/18   2004  04/19/18   0021  04/19/18   0414  04/19/18   0750   POCTGLUCOSE  303*  254*  231*  251*  198*  241*  221*  241*  175*  215*       Current Medications and/or Treatments Impacting Glycemic Control  Immunotherapy:  Immunosuppressants         Stop Route Frequency     tacrolimus capsule 2 mg      -- SL 2 times daily        Steroids:   Hormones     Start     Stop Route Frequency Ordered    04/19/18 0900  predniSONE tablet 5 mg      -- PER G TUBE Daily 04/18/18 1106        Pressors:    Autonomic Drugs     Start     Stop Route Frequency Ordered    04/13/18 0859  midodrine tablet 10 mg      -- Oral As needed (PRN) 04/13/18 0800    04/09/18 1500  midodrine tablet 2.5 mg      -- Oral 3 times daily 04/09/18 1006        Hyperglycemia/Diabetes Medications: Antihyperglycemics     Start     Stop Route Frequency Ordered    04/11/18 0015  insulin regular (Humulin R) 100 Units in sodium chloride 0.9% 100 mL infusion     Question:   Insulin Rate Adjustment (DO NOT MODIFY ANSWER)  Answer:  [email or web address]    -- IV Continuous 04/10/18 2313    04/10/18 2221  insulin aspart U-100 pen 0-4 Units      -- SubQ Every 4 hours PRN 04/10/18 2122          ASSESSMENT and PLAN    * Acute respiratory failure with hypoxia    Per primary  S/p trach.  Practicing with speech valve.  Remains NPO        Type 1 diabetes mellitus with stage 3 chronic kidney disease    BG goal 140-180, bg elevated     increase transition gtt to 1.5 u/hr    Pt is T1DM, do not suspend insulin >1 hr   If TF held, decrease insulin rate to 0.2u/hr - known to have controlled BG on basal needs of lev 5 daily/ 0.2u/hr during this hospitalization        Discharge Recommendations:  TBD.        Hypothyroidism due to Hashimoto's thyroiditis    continue iv levothyroxine 75mcg daily  Recheck tft's late week          On enteral nutrition    TF at goal.  May increase insulin needs        Current chronic use of systemic steroids    On PDN 7.5mg daily  Can increase insulin requirement        CKD (chronic kidney disease), stage IV    Titrate cautiously.  Caution with insulin stacking.  Avoid hypoglycemia.        Heart transplanted    Per transplant  Avoid hypoglycemia  On PDN and prograf - could lead to prandial elevations and insulin resistance.            Ankur Keith MD  Endocrinology  [facility]

## 2018-04-19 NOTE — SUBJECTIVE & OBJECTIVE
Interval History:   No complaints voiced this morning, oxygenating well on TC.  Electrolytes stable.  Tolerated HD treatment yesterday with 1L net fluid removal.    Review of patient's allergies indicates:   Allergen Reactions    No known drug allergies      Current Facility-Administered Medications   Medication Frequency    0.9%  NaCl infusion PRN    0.9%  NaCl infusion PRN    acetaminophen oral solution 999.0148 mg TID    cetirizine tablet 5 mg Daily    chlorhexidine 0.12 % solution 15 mL BID    epoetin jose miguel injection 4,000 Units Every Mon, Wed, Fri    escitalopram oxalate tablet 10 mg Daily    famotidine tablet 20 mg QHS    heparin (porcine) injection 1,000 Units PRN    heparin (porcine) injection 5,000 Units Q12H    insulin aspart U-100 pen 0-4 Units Q4H PRN    insulin regular (Humulin R) 100 Units in sodium chloride 0.9% 100 mL infusion Continuous    levothyroxine injection 75 mcg Daily    midodrine tablet 10 mg PRN    midodrine tablet 2.5 mg TID    nitroGLYCERIN 2% TD oint ointment 0.5 inch Q12H    ondansetron disintegrating tablet 4 mg Q6H PRN    oxyCODONE 5 mg/5 mL solution 5 mg Q6H PRN    polyethylene glycol packet 17 g Daily    povidone-iodine 10 % ointment PRN    predniSONE tablet 5 mg Daily    sennosides 8.8 mg/5 ml syrup 5 mL QHS    tacrolimus capsule 2 mg BID       Objective:     Vital Signs (Most Recent):  Temp: 98.1 °F (36.7 °C) (04/19/18 1136)  Pulse: 108 (04/19/18 1136)  Resp: 17 (04/19/18 1136)  BP: (!) 105/51 (04/19/18 1136)  SpO2: 98 % (04/19/18 1222)  O2 Device (Oxygen Therapy): Trach Collar (04/19/18 1222) Vital Signs (24h Range):  Temp:  [97.6 °F (36.4 °C)-99.3 °F (37.4 °C)] 98.1 °F (36.7 °C)  Pulse:  [104-116] 108  Resp:  [17-18] 17  SpO2:  [96 %-100 %] 98 %  BP: ()/(51-66) 105/51     Weight:  (bed needs to be zeroed if pt gets up for PT) (04/19/18 0515)  Body mass index is 23.1 kg/m².  Body surface area is 1.78 meters squared.    I/O last 3 completed  shifts:  In: 1760.8 [I.V.:30.8; Other:650; NG/GT:1080]  Out: 1650 [Other:1650]    Physical Exam   Constitutional: He appears well-developed and well-nourished. No distress.   HENT:   Head: Normocephalic and atraumatic.   Eyes: Conjunctivae and EOM are normal.   Cardiovascular: Regular rhythm.  Tachycardia present.    Pulmonary/Chest: He has no wheezes. He has no rales.   Abdominal: Soft. He exhibits no distension.   Musculoskeletal: He exhibits edema. He exhibits no tenderness or deformity.   Neurological: He is alert.   Skin: Skin is warm and dry. He is not diaphoretic.       Significant Labs:  CBC:   Recent Labs  Lab 04/19/18  0513   WBC 3.52*   RBC 2.57*   HGB 8.1*   HCT 25.2*      MCV 98   MCH 31.5*   MCHC 32.1     CMP:   Recent Labs  Lab 04/19/18  0512   *   CALCIUM 9.2   ALBUMIN 2.3*   PROT 6.5   *   K 4.5   CO2 23      BUN 26*   CREATININE 2.1*   ALKPHOS 223*   ALT 14   AST 19   BILITOT 0.5

## 2018-04-19 NOTE — PROGRESS NOTES
[facility]  Heart Transplant  Progress Note    Patient Name: Lv Crocker  MRN: [id number]  Admission Date: 3/11/2018  Hospital Length of Stay: 38 days  Attending Physician: Behzad Lara Jr.,*  Primary Care Provider: Philippe Mohr MD  Principal Problem:Acute respiratory failure with hypoxia    Subjective:     Interval History: No complaints today other than continued insomnia. Will request no vitals from 11p-6a for patient on non SLED nights for better sleep hygiene. Plan for SLED in attempt to increase energy during day and availability to work with therapy. PT and family in agreement.     Continuous Infusions:   insulin (HUMAN R) infusion (adults) 1.5 Units/hr (04/19/18 1200)     Scheduled Meds:   acetaminophen  999.0148 mg Per G Tube TID    cetirizine  5 mg Per G Tube Daily    chlorhexidine  15 mL Mouth/Throat BID    epoetin jose miguel (PROCRIT) injection  4,000 Units Intravenous Every Mon, Wed, Fri    escitalopram oxalate  10 mg Per G Tube Daily    famotidine  20 mg Per G Tube QHS    heparin (porcine)  5,000 Units Subcutaneous Q12H    levothyroxine  75 mcg Intravenous Daily    midodrine  2.5 mg Oral TID    nitroGLYCERIN 2% TD oint  0.5 inch Topical (Top) Q12H    polyethylene glycol  17 g Oral Daily    predniSONE  5 mg Per G Tube Daily    sennosides 8.8 mg/5 ml  5 mL Oral QHS    tacrolimus  2 mg Sublingual BID     PRN Meds:sodium chloride 0.9%, sodium chloride 0.9%, heparin (porcine), insulin aspart U-100, midodrine, ondansetron, oxyCODONE, povidone-iodine    Review of patient's allergies indicates:   Allergen Reactions    No known drug allergies      Objective:     Vital Signs (Most Recent):  Temp: 98.1 °F (36.7 °C) (04/19/18 1136)  Pulse: 108 (04/19/18 1136)  Resp: 17 (04/19/18 1136)  BP: (!) 105/51 (04/19/18 1136)  SpO2: 96 % (04/19/18 1136) Vital Signs (24h Range):  Temp:  [97.6 °F (36.4 °C)-99.3 °F (37.4 °C)] 98.1 °F (36.7 °C)  Pulse:  [104-116] 108  Resp:   [17-18] 17  SpO2:  [96 %-100 %] 96 %  BP: ()/(51-66) 105/51     No data found.    Body mass index is 23.1 kg/m².      Intake/Output Summary (Last 24 hours) at 04/19/18 1220  Last data filed at 04/18/18 2300   Gross per 24 hour   Intake           540.79 ml   Output                0 ml   Net           540.79 ml     Hemodynamic Parameters:     Physical Exam   Constitutional: He appears well-developed and well-nourished. No distress.   HENT:   Head: Normocephalic and atraumatic.   Eyes: Conjunctivae and EOM are normal.   Cardiovascular: Regular rhythm.  Tachycardia present.    Pulmonary/Chest: He has no wheezes. He has no rales.   Abdominal: Soft. He exhibits no distension.   Musculoskeletal: He exhibits edema. He exhibits no tenderness or deformity.   Neurological: He is alert.   Skin: Skin is warm and dry. He is not diaphoretic.     Significant Labs:  CBC:    Recent Labs  Lab 04/17/18  0810 04/18/18  0534 04/19/18  0513   WBC 5.01 3.21* 3.52*   RBC 2.69* 2.48* 2.57*   HGB 8.4* 7.8* 8.1*   HCT 26.3* 25.7* 25.2*    179 189   MCV 98 104* 98   MCH 31.2* 31.5* 31.5*   MCHC 31.9* 30.4* 32.1     BNP:  No results for input(s): BNP in the last 168 hours.    Invalid input(s): BNPTRIAGELBLO  CMP:    Recent Labs  Lab 04/17/18  0554 04/18/18  0534 04/19/18  0512   GLU 81 254* 171*   CALCIUM 8.6* 9.9 9.2   ALBUMIN 2.1* 2.4* 2.3*   PROT 5.9* 6.7 6.5    134* 132*   K 5.3* 5.6* 4.5   CO2 21* 22* 23    103 100   BUN 26* 46* 26*   CREATININE 1.9* 3.1* 2.1*   ALKPHOS 222* 230* 223*   ALT 15 15 14   AST 26 16 19   BILITOT 0.5 0.5 0.5      Coagulation:   No results for input(s): PT, INR, APTT in the last 168 hours.  LDH:  No results for input(s): LDH in the last 72 hours.  Microbiology:  Microbiology Results (last 7 days)     Procedure Component Value Units Date/Time    Fungus culture [[id number]] Collected:  03/14/18 1137    Order Status:  Completed Specimen:  Bronchial Wash from Amniotic Fluid Updated:  04/18/18  1303     Fungus (Mycology) Culture No fungus isolated after 4 weeks          I have reviewed all pertinent labs within the past 24 hours.    Estimated Creatinine Clearance: 42 mL/min (A) (based on SCr of 2.1 mg/dL (H)).    Diagnostic Results:  I have reviewed and interpreted all pertinent imaging results/findings within the past 24 hours.    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 Most recently has had ongoing issues with his lungs, had gotten 2 thoracenteses, after which he underwent VATS 01/19/18 followed by pleurex catheter placement and effusion drainage 01/11/18, subsequently had it removed 02/07/18 after drainage had decreased despite multiple attempts to drain it. Has been having significant coughing fits that result in him being near-syncopal at times, although difficult to say if he has passed out or not- patient most recently was admitted to the hospital for increased AGUILAR, coughing and pre-syncope 02/11-02/14/18 at [facility].   Patient was started on antibiotics for suspected bacterial infection, with some diarrhea, bronchitis, and possible pneumonia. Ct chest showed some pleural fluid collection and after talking with Dr. James, we arranged for him to receive a diagnostic tap with IR, from which the fluid collection demonstrated no growth or significant findings at all really. Cell counts do not appear to have been sent but will recheck. Patient states since his hospital stay, yesterday had a significant coughing fit again, after which he nearly passed out, is being seen in clinic today for this. Denies any cardiopulmonary complaints, no leg swelling, has some abdominal swelling, which is moderate for him. Denies significant shortness of breath with mild exertion, had 6MWT yesterday to see if he qualified for  home O2, and his O2 sats actually improved after recovery from where he started initially. He and his wife admit he is in bed most days, not very active.     Mr. Crocker presented to the ED 3/11/18 with approximately 3 weeks of worsening diarrhea and 2-3 days of sinus pressure, drainage, and worsened cough.  He notes that he had a coughing fit last night and passed out, coughed throughout most of the night.  This also happened on 2/25/18.  He last saw Dr Ferreira in clinic on 2/20/18 where he was taken off myfortic and switched to cellcept (he hadn't been on cellcept because of leukopenia when they tried post-transplant).  Though his diarrhea had improved after his last discharge, he states it has increased now to 15x/day, clear/yellow/green, no fevers, no exacerbating foods per say.  He was taken back off the cellcept and placed back on myfortic this past week and has not noticed immediate change in diarrhea. He also reports his baseline coughing fits havent improved and are minimally responsive to tessalon pearls.  Came on last night, he lost consciousness during a fit once which is relatively normal for him, and had two more during HPI.  He notes no sick contacts but does state he's had some URI symptoms as above.  His baseline vitals are usually -120 and BP 90s/60s-110s/70s.  He reports a history of intermittent diarrhea - did have Cdiff many years ago but this smells different.  No abdominal pain, no nausea, no vomiting.  No blood in diarrhea.  Appears last c-scope in 2015 with no evidence of infection or inflammatory processes.  He does report IBS which is different that this.       * Acute respiratory failure with hypoxia    - Resolved.   - S/P Bronch and intubation 3/14 now post tracheostomy due to inability to extubate  - Pulmonology signed off. Completely weaned off vent.   - RSV positive early and completed course of Ribavarin/IVIG.  - ID had been following. Completed 7 day course of  Meropenem/Linezolid earlier in hospitalization  - Appreciate PT/OT/Wound care.        Alteration in skin integrity    - wound care following        Restrictive cardiomyopathy    - No changes from before.   - Has known history of recurrent ascites and pleural effusions   - S/P thoracentesis X 2, followed by VATS/pleurex cath placement (January 2018) with removal of pleurex (February 2018) and 3rd thoracentesis 2/14/18 with no significant findings on fluid removal.        Type 1 diabetes mellitus with stage 3 chronic kidney disease    - Appreciate Endocrine's recs  - Insulin gtt per endocrine recs        Hypothyroidism due to Hashimoto's thyroiditis    - Appreciate Endocrine's recs  - Continue Levothyroxine 150mcg per NG tube (adjusted over the weekend)        Acute renal failure with acute tubular necrosis superimposed on stage 3 chronic kidney disease    - Appreciate Nephrology recs.   - Continue middorine for pressure support  - HD per nephrology.    - plan for nocturnal SLED for better sleep hygiene and increased alertness during day for therapy. Can transition back to intermittent HD or afternoon HD once we have placement for patient.         Anemia    - Transfused 2 units of PRBCs on 4/11 during HD  - Nephrology has resumed ProCrit         Heart transplanted    - TTE 3/26/18 showed normal graft function but has known history of restrictive CM post transplant.  - Decrease pred , Tacro switched to SL as levels consistently low. Improved today.   - Cellcept remains on hold  - HD today        Debility    - PT/OT/SLP daily.   - PEG tube placed and tolerating feeds.   - Nutrition following.    - switched from glucerna to novasource renal (start at 10 and increase to goal of 40 as tolerated)        Anxiety    - Continue PTA  Escitalopram, will stop xanex and nortriptyline due to hallucinations and give sleep med and less pain meds  - consulted Psych today for with mood and insomnia            Kelsea Ryan,  HOMER  Heart Transplant  [facility]

## 2018-04-19 NOTE — ASSESSMENT & PLAN NOTE
- PT/OT/SLP daily.   - PEG tube placed and tolerating feeds.   - Nutrition following.    - switched from glucerna to novasource renal (start at 10 and increase to goal of 40 as tolerated)

## 2018-04-19 NOTE — HPI
Patient is a 42 y.o. male with a diagnosis of T1DM, HTN, hypothyroidism, s/p heart transplant. Consulted for B/L feet gangrene. History obtained from wife present at bedside. Pt. Reports began to notice darkening to toes one month ago during hospital admission after patient on pressors. Pt. Complains of pain to right great toe and third toe and pain to left great toe as well. Wife reports patient is unable to bear weight during rehab sessions due to severe pain at right great toe and third toe and left great toe. Heel protector boots intact B/L.

## 2018-04-19 NOTE — PT/OT/SLP PROGRESS
Occupational Therapy   Treatment    Name: Lv Crockre  MRN: [id number]  Admitting Diagnosis:  Acute respiratory failure with hypoxia  15 Days Post-Op    Recommendations:     Discharge Recommendations: rehabilitation facility  Discharge Equipment Recommendations:     Barriers to discharge:  Decreased caregiver support, Inaccessible home environment    Subjective     Communicated with: nsg prior to session. Cc with PT , cc with PM&R NP re: status/progress  Pain/Comfort:  · Pain Rating 1: 6/10 (B feet)  · Pain Addressed 1: Reposition, Distraction    Patients cultural, spiritual, Latter day conflicts given the current situation: none    Objective:     Patient found with: telemetry, tracheostomy, oxygen, peripheral IV, PEG Tube, pressure relief boots    General Precautions: Standard, fall, NPO, respiratory   Orthopedic Precautions:N/A   Braces:       Occupational Performance:    Bed Mobility:    · Total assist x 2 sup >< sit     Functional Mobility/Transfers:  · Transfers not performed as pt with intermittent dizziness, pain B feet        · Activities of Daily Living:  · Total assist with basic ADL's    Patient left HOB elevated ~50' with all lines intact and wife present    [facility] 6 Click:  [facility] Total Score: 6    Treatment & Education:  Pt sat EOB x ~24 min, able to static sit x ~10 sec with spvn, then 5 sec on another attempt with spvn, mainly requires occasional mod but mostly max assist for most static and all dynamic sitting balance. Practiced LUE lateral pushups x 5 reps with pt able to initiate the exercises with noted activation in triceps , AAROM BUE biceps x 5 reps (~1/4 Ag), practiced protraction/retraction exercises with postural tactile cues/assist form PT , neck AROM in forward flexion/extension and turning L/R. Practiced core strength exercises with forward/back wt. Shifts and postural activities. Pt/family educated on exercises, plan of care, upright sitting in bed.  Education:    Assessment:      Lv Crocker is a 42 y.o. male with a medical diagnosis of Acute respiratory failure with hypoxia.  He presents with increased time in sitting EOB today, but with intermittent dizziness throughout tx and c/o not having slept x 5 d, good effort with activity with min encouragement, limited also by pain on R shoulder and B feet.  Performance deficits affecting function are weakness, impaired endurance, impaired self care skills, impaired balance, gait instability, impaired functional mobilty, decreased upper extremity function, decreased lower extremity function, impaired coordination, impaired skin, pain, impaired cardiopulmonary response to activity, impaired joint extensibility, impaired muscle length.      Rehab Prognosis:  good; patient would benefit from acute skilled OT services to address these deficits and reach maximum level of function.       Plan:     Patient to be seen 5 x/week to address the above listed problems via self-care/home management, therapeutic activities, therapeutic exercises, neuromuscular re-education  · Plan of Care Expires: 05/06/18  · Plan of Care Reviewed with: patient, spouse    This Plan of care has been discussed with the patient who was involved in its development and understands and is in agreement with the identified goals and treatment plan    GOALS:    Occupational Therapy Goals        Problem: Occupational Therapy Goal    Goal Priority Disciplines Outcome Interventions   Occupational Therapy Goal     OT, PT/OT Ongoing (interventions implemented as appropriate)    Description:  Goals to be met by: 4/23/2018    Pt will follow 75% of motor commands with <2 verbal cues for repetition of task to maximize engagement in grooming task.--MET  Pt will complete feeding with mod A.   Pt will complete supine with HOB slightly elevated to seated at EOB with mod A in prep for seated grooming task.  Pt will engage in BUE exercises in orders to increase strength to 3+/5 in BUE's  to increase engagement in seated grooming task  Pt will sit at EOB for approx 10 minutes with mod A and unilateral UE support to complete grooming task  Pt will complete sit>stand from EOB with bed in lowest position with mod A in prep [place] [facility]                      Time Tracking:     OT Date of Treatment: 04/19/18  OT Start Time: 1028  OT Stop Time: 1117  OT Total Time (min): 49 min    Billable Minutes:Therapeutic Exercise 49 min    Delia To OT  4/19/2018

## 2018-04-19 NOTE — PLAN OF CARE
Problem: SLP Goal  Goal: SLP Goal  Speech Language Pathology Goals  Goals expected to be met by 4/20/18  1.  Pt will tolerate PMSV for 30 minutes w/ no change in respiratory status and fair voicing produced.  2.  Pt will complete further evaluation of cognitive-linguistic communication skills to determine the need for additional goals. Met 4/19  3. Pt will name up to 12 items/minute in a concrete category, 90% of attempts, Supervision, to improve cognitive organization  4. Pt will recall 3 related items post 3 minute filled delay, 90% of attempts, Supervision, to improve STM   5. Pt will answer m/u YNQ with 90% accuracy, Supervision, to improve higher-level comprehension   6. Pt will complete further assessment of reading/writing skills  7. Pending MD clearance, Pt will complete further assessment of swallow fx   8. Educate Pt and family on PMSV precautions and safety awareness      Goals expected to be met by 4/13/18     1.  Pt will tolerate PMSV for 3 min w/ no change in respiratory status and fair voicing produced. Met   2.  Pt will complete further evaluation of cognitive-linguistic communication skills to determine the need for additional goals.        Outcome: Ongoing (interventions implemented as appropriate)  SLP evaluation completed.  Patient presents with mild higher-level cognitive-linguistic deficits c/b mildly decreased attention, STM, organization and higher-level comprehension skills.  Patient with moderately hoarse vocal quality while donning speaking valve this service day.  Pt & Spouse reminded to only wear valve with Supervision, when awake and alert, and wash with soap and water. Precautions posted on whiteboard in room.  ST to continue to follow. Thank you.    JOSELYN Delarosa., [facility]-SLP  Speech-Language Pathology  Pager: [phone]  4/19/2018

## 2018-04-19 NOTE — PLAN OF CARE
Problem: Patient Care Overview  Goal: Plan of Care Review  Outcome: Ongoing (interventions implemented as appropriate)  Patient awake and alert. Oriented to self. Disoriented to situation, time, and place; frequent reorienting. Left FA PIV and UA midline cdi. Insulin increased to 1.5ml/hr per order. accuchecks q4hr. NPO. Tube feed at 30ml/hr; goal rate of 40ml/hr, increase as tolerated. LLQ peg tube cdi. Left chest perm cath cdi; plan for CRRT at bedside at night MWF. Telemetry monitoring, ST.  Generalized edema. +6 shiley in place; trach collar removed to wean O2, o2 sat >96% RA, patient reports no sob at this time. Per speech patient to wear valve on and off throughout day. Left and right feet in heel boots for DTIs and low airloss overlay, frequent turning. Psychology consulted. Podiatry consulted; plan for bilateral feet xray and arterial ultrasound.  Medication cream applied to toes as ordered. Standard precautions maintained.

## 2018-04-20 NOTE — PROGRESS NOTES
UPDATE    SW to pt's room for update. Pt sleeping at this time. Pt's wife at bedside. Pt's wife reports she spoke with someone from [facility] inpt rehab that explained pt will likely not be accepted for outpt HD due to trach. Per wife, pt may be able to go rehab once they are moved into new facility, as they will have dialysis on sight. SW spoke with [facility] admissions and confirmed [facility] is still reviewing pt's referral packet and cannot confirm either denial or acceptance.     Additionally, pt's wife states she was told that pt may not be a candidate for rehab at all, if it is determined that he needs more than a few weeks of rehab. Pt's wife is tearful and states she does not want to consider halfway care in a nursing home. SW provided counseling support.    No other needs voiced at this time. SW providing psychosocial and counseling support, education, resources, and d/c planning as needed. SW continuing to follow and remains available.

## 2018-04-20 NOTE — PROGRESS NOTES
"[facility]  Endocrinology  Progress Note    Admit Date: 3/11/2018     Reason for Consult: abnormal TFTs    Surgical Procedure and Date: s/p heart transplant 2014     Diabetes diagnosis year: 7yo (T1DM)     Home Diabetes Medications:    Levemir 15u qHS  Novolog 4u AC     How often checking glucose at home? 4 times daily   BG readings on regimen: 150-200s  Hypoglycemia on the regimen?  Yes, rarely - treats appropriately (light snack, glucose tab)  Missed doses on regimen?  No        Diabetes Complications include:     Hyperglycemia, Hypoglycemia  and Diabetic chronic kidney disease          Complicating diabetes co morbidities:   N/A     HPI:   Patient is a 42 y.o. male with a diagnosis of T1DM, HTN, hypothyroidism, s/p heart transplant.  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 He required serial paracentesis and thoracentesis until he underwent a VATS with pleurX catheter placement on 1/11/2018 and removed 2/7/18.       Presented to hospital secondary to diarrhea and cough.  Upon initial labs, TSH was decreased (0.101) and free T4 1.33.  Endocrinology consulted to assist in management of these labs.  He was last seen in clinic by Kamala Vázquez NP 11/2017.   Previous hospitalization, endocrine consulted for same problem.  During that visit, recommended decreasing LT4 to 125mcg daily. Unfortunately, patient was discharged on previous dose of 150mcg daily.     Interval HPI:   Overnight events: none  Eating:   NPO  Nausea: No  Hypoglycemia and intervention: No  Fever: No  TPN and/or TF: Yes  If yes, type of TF/TPN and rate: 50cc/hr    BP (!) 104/56 (BP Location: Right arm, Patient Position: Lying)   Pulse 100   Temp 97.7 °F (36.5 °C) (Oral)   Resp 18   Ht 5' 7" (1.702 m)   Wt 74.8 kg (164 lb 14.5 oz)   SpO2 100%   BMI 25.83 kg/m²       Labs Reviewed and Include      Recent Labs  Lab " 04/20/18  0358   *   CALCIUM 9.8   ALBUMIN 2.1*   PROT 6.4   *   K 5.6*   CO2 23   CL 99   BUN 41*   CREATININE 2.8*   ALKPHOS 208*   ALT 13   AST 16   BILITOT 0.4     Lab Results   Component Value Date    WBC 3.30 (L) 04/20/2018    HGB 8.4 (L) 04/20/2018    HCT 27.6 (L) 04/20/2018     (H) 04/20/2018     04/20/2018       Recent Labs  Lab 04/15/18  0743   TSH 12.752*   FREET4 0.94     Lab Results   Component Value Date    HGBA1C 5.1 04/05/2018       Nutritional status:   Body mass index is 25.83 kg/m².  Lab Results   Component Value Date    ALBUMIN 2.1 (L) 04/20/2018    ALBUMIN 2.3 (L) 04/19/2018    ALBUMIN 2.4 (L) 04/18/2018     Lab Results   Component Value Date    PREALBUMIN 13 (L) 04/08/2018    PREALBUMIN 5 (L) 03/15/2018    PREALBUMIN 18 (L) 03/24/2015       Estimated Creatinine Clearance: 31.5 mL/min (A) (based on SCr of 2.8 mg/dL (H)).    Accu-Checks  Recent Labs      04/18/18   2004  04/19/18   0021  04/19/18   0414  04/19/18   0750  04/19/18   1202  04/19/18   1603  04/19/18   2014  04/20/18   0004  04/20/18   0425  04/20/18   0753   POCTGLUCOSE  221*  241*  175*  215*  222*  207*  204*  178*  162*  137*       Current Medications and/or Treatments Impacting Glycemic Control  Immunotherapy:  Immunosuppressants         Stop Route Frequency     tacrolimus capsule 2 mg      -- SL 2 times daily        Steroids:   Hormones     Start     Stop Route Frequency Ordered    04/19/18 0900  predniSONE tablet 5 mg      -- PER G TUBE Daily 04/18/18 1106        Pressors:    Autonomic Drugs     Start     Stop Route Frequency Ordered    04/13/18 0859  midodrine tablet 10 mg      -- Oral As needed (PRN) 04/13/18 0800    04/09/18 1500  midodrine tablet 2.5 mg      -- Oral 3 times daily 04/09/18 1006        Hyperglycemia/Diabetes Medications: Antihyperglycemics     Start     Stop Route Frequency Ordered    04/11/18 0015  insulin regular (Humulin R) 100 Units in sodium chloride 0.9% 100 mL infusion      Question:  Insulin Rate Adjustment (DO NOT MODIFY ANSWER)  Answer:  [email or web address]    -- IV Continuous 04/10/18 2313    04/10/18 2221  insulin aspart U-100 pen 0-4 Units      -- SubQ Every 4 hours PRN 04/10/18 2122          ASSESSMENT and PLAN    * Acute respiratory failure with hypoxia    Per primary  S/p trach.  Practicing with speech valve.  Remains NPO        Type 1 diabetes mellitus with stage 3 chronic kidney disease    BG goal 140-180, bg at goal     continue transition gtt at 1.3 u/hr    Pt is T1DM, do not suspend insulin >1 hr   If TF held, decrease insulin rate to 0.2u/hr - known to have controlled BG on basal needs of lev 5 daily/ 0.2u/hr during this hospitalization        Discharge Recommendations:  TBD.        Hypothyroidism due to Hashimoto's thyroiditis    continue iv levothyroxine 75mcg daily  Recheck tsh tomorrow morning          On enteral nutrition    TF at goal.  May increase insulin needs        Current chronic use of systemic steroids    On PDN 5mg daily  Can increase insulin requirement        CKD (chronic kidney disease), stage IV    Titrate cautiously.  Caution with insulin stacking.  Avoid hypoglycemia.        Heart transplanted    Per transplant  Avoid hypoglycemia  On PDN and prograf - could lead to prandial elevations and insulin resistance.            Ankur Keith MD  Endocrinology  [facility]

## 2018-04-20 NOTE — PT/OT/SLP PROGRESS
Speech Language Pathology      Lv Crocker  MRN: [id number]    Patient not seen today secondary to MARK for dialysis across SLP attempts this afternoon. Will follow-up 4/23/18. Thank you.    JOSELYN Delarosa., [facility]-SLP  Speech-Language Pathology  Pager: [phone]  4/20/2018

## 2018-04-20 NOTE — ASSESSMENT & PLAN NOTE
- baseline sCr 2.6-3.0 consistent with CKD stage IV  - anuric CACHORRO; likely ischemic ATN from prolonged pre-renal state (diarrhea) in setting of prograf renal vasoconstriction; cannot r/o septic ATN or Prograf toxicity  - SLED started 3/14. TDC placed 4/4/18.   - remains anuric    Plan:  Continue procrit TIW    3 hour HD treatment today for metabolic clearance/volume management.  UF goal 1-1.5L as tolerated.  Plan is to provide nocturnal SLED for metabolic clearance/volume management so patient can participate in therapy during the day.  May consider starting on Veltassa if he continues to have recurrent hyperkalemia (?prograf?)

## 2018-04-20 NOTE — PROGRESS NOTES
"   [facility]  Adult Nutrition  Progress Note    SUMMARY     Recommendations  Recommendation/Intervention: Pt is malnourished - encourage TF to continue running at goal - limit number of times TF is stopped/paused. Discussed important of goal rate with patient.   -Increase TF of Novasource Renal to goal rate of 40mL/hr. Hold for residuals > 500mL. Continue adding Arginaid BID to water flushes to aid in wound healing.   -This TF formula does not contain fiber. Recommending adding psyllium to TF flushes.   -RD following.     Goals: Meet % EEN, EPN  Nutrition Goal Status: progressing towards goal   Communication of RD Recs: reviewed with RN    Reason for Assessment  Reason for Assessment: RD follow-up  Diagnosis: other (see comments) (Resp. fx)  Relevant Medical History: Hashimoto's disease, HTN, HLD, DM, CHF, Heart tx (2014)  Interdisciplinary Rounds: did not attend    General Information Comments: TF Novasource Renal running @ 30 ml/hr. Goal Rate is 40 ml/hr. Discussed w/ pt and nurse about increasing TF to goal rate in order for pt to meet EEN and EPN and prevent further wt loss. Pt was first hesitant d/t nausea and high gastric residual volume, but agreed to increase TF to 40 ml. Nurse states that pt  is also recieving arginaid.     Nutrition Discharge Planning: Tolerance of TF    Nutrition Risk Screen  Nutrition Risk Screen: large or nonhealing wound, burn or pressure ulcer, dysphagia or difficulty swallowing, tube feeding or parenteral nutrition    Nutrition/Diet History  Patient Reported Diet/Restrictions/Preferences: other (see comments) (HAO)  Do you have any cultural, spiritual, Yazidi conflicts, given your current situation?: none noted   Factors Affecting Nutritional Intake: NPO    Anthropometrics  Temp: 97.9 °F (36.6 °C)  Height Method: Stated  Height: 5' 7" (170.2 cm)  Height (inches): 67 in  Weight Method: Bed Scale  Weight: 74.8 kg (164 lb 14.5 oz)  Weight (lb): 164.91 " lb  Ideal Body Weight (IBW), Male: 148 lb  % Ideal Body Weight, Male (lb): 111.43 lb  BMI (Calculated): 25.9  BMI Grade: 25 - 29.9 - overweight  Usual Body Weight (UBW), kg:  (HAO)     Lab/Procedures/Meds  Pertinent Labs Reviewed: reviewed  Pertinent Labs Comments: Na 134, K 5.6, BUN 41, Crn 2.8, GFR 26.2, Glu 140, Alk Phos 208, Alb 2.1   Pertinent Medications Reviewed: reviewed  Pertinent Medications Comments: epoetin, heparin, prednisone, tacrolimus, insulin, escitalopram    Physical Findings/Assessment  Overall Physical Appearance: underweight, other (see comments) (On trach collar)  Tubes: gastrostomy tube  Oral/Mouth Cavity: WDL  Skin: incision(s)    Estimated/Assessed Needs  Weight Used For Calorie Calculations: 66.9 kg (147 lb 7.8 oz)  Energy Calorie Requirements (kcal): 1896 (1.25x PAL)  Energy Need Method: [place]-St Jeor (1.25 PAL)  Protein Requirements: 80-94g (1.2-1.4 g/kg)  Weight Used For Protein Calculations: 66.9 kg (147 lb 7.8 oz)  Fluid Requirements (mL): 80-94  Fluid Need Method: other (see comments) (Per MD or 1 mL/kcal)  RDA Method (mL): 1896  CHO Requirement: 50% total kcals    Nutrition Prescription Ordered  Current Diet Order: NPO  Current Nutrition Support Formula Ordered: Novasource Renal  Current Nutrition Support Rate Ordered: 40 (ml) (running at 30 ml )  Current Nutrition Support Frequency Ordered: mL/hr    Evaluation of Received Nutrient/Fluid Intake    Intake/Output Summary (Last 24 hours) at 04/20/18 1417  Last data filed at 04/20/18 0700   Gross per 24 hour   Intake           515.41 ml   Output               60 ml   Net           455.41 ml     Enteral Calories (kcal): 1440  Enteral Protein (gm): 65.52  Enteral (Free Water) Fluid (mL): 518.4  Other Calories (kcal): 0  % Kcal Needs: 76%  % Protein Needs: 82%  IV Fluid (mL): 0  Energy Calories Required: not meeting needs  Protein Required: not meeting needs  Fluid Required: not meeting needs  Comments: LB 4/18  Tolerance: tolerating  %  Intake of Estimated Energy Needs: 50 - 75 %  % Meal Intake: NPO    Nutrition Risk  Level of Risk/Frequency of Follow-up:  (f/u 2x/wk )     Assessment and Plan  * Acute respiratory failure with hypoxia        Nutrition Problem  Inadequate energy intake     Related to (etiology):   Inability to consume sufficient energy     Signs and Symptoms (as evidenced by):   NPO with no alternate means of nutrition      Nutrition Diagnosis Status:   Resolved                Severe malnutrition     Contributing Nutrition Diagnosis  Inadequate oral intake     Related to (etiology):  Tracheostomy 2' ARF, decreased ability to consume sufficient energy     Signs and Symptoms (as evidenced by):   40# weight loss in past 3 months, on continues TF - stopped frequently      Interventions/Recommendations (treatment strategy):  See recs     Nutrition Diagnosis Status:   Continues      Monitor and Evaluation  Food and Nutrient Intake: enteral nutrition intake  Food and Nutrient Adminstration: enteral and parenteral nutrition administration  Physical Activity and Function: nutrition-related ADLs and IADLs  Anthropometric Measurements: weight, weight change  Biochemical Data, Medical Tests and Procedures: lipid profile, inflammatory profile, glucose/endocrine profile, gastrointestinal profile, electrolyte and renal panel  Nutrition-Focused Physical Findings: overall appearance     Nutrition Follow-Up  RD Follow-up?: Yes

## 2018-04-20 NOTE — PROGRESS NOTES
HD treatment started. No complications with access to left chest wall catheter. Lines secured and telemetry in place. No complaints of discomfort at this time. Tube feeding infusing.

## 2018-04-20 NOTE — ASSESSMENT & PLAN NOTE
-  Intubated on 3/14/18--> s/p trach 3/28/18--> now on RA  -  Completed 7 day course of Meropenem/Linezolid   -  RSV positive early- completed course of Ribavarin/IVIG

## 2018-04-20 NOTE — PROGRESS NOTES
HD treatment complete. Duration of treatment 3 hours and 500cc removed.  No complications with access to left chest wall catheter. Treatment was tolerated fairly well, blood pressure was low during treatment. Catheter flushed with NS and locked with heparin. Capped and taped.

## 2018-04-20 NOTE — PROGRESS NOTES
- Pt has an ultrasound ordered of his bilateral LE - routine status entered by Podiatry consulting physician today.  - Ultrasound called this RN at 2100 about getting the pt down to US tonight - stated US would take approx. 25 minutes plus allowance time for transport both ways.  - This RN spoke with pt who stated he would rather do the ultrasound in the morning - pt states he has not had a good night's rest in approx. 5 days. Communicated this to Ultrasound tech who stated he would schedule the pt for Friday morning.

## 2018-04-20 NOTE — ASSESSMENT & PLAN NOTE
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  Mr. Crocker presented to the ED 3/11/18 with approximately 3 weeks of worsening diarrhea and 2-3 days of sinus pressure, drainage, and worsened cough.    -No need for PEC or inpatient psychiatric hospitalization at this time as he is not a danger to himself or others and is not gravely disabled.   -Recommend increasing Lexapro back to 20 mg PO daily  -Recommend starting Seroquel 50 mg PO daily as needed for anxiety

## 2018-04-20 NOTE — HPI
Per primary team H&P:  41 yo gentleman with hashimoto's thyroidits, DM1 on insulin, s/p OHTX 11/14 complicated by AMR, treated 04/15 with 5 days PP, IVIG x 2 doses, was scheduled for Rituximab on 5/1/15 but developed pancytopenia in April 2015 after which he got CMV and c. Dif at the same time.  Angiogram 2/16 with IVUS showed no evidence of CAV. 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 He was getting monthly paracentesis and thoracentesis until he underwent a VATS with pleurX catheter placement on 1/11/2018. He had been draining 700-800cc of fluid every 2 days until about 3 weeks ago, where the drainage had decreased.  Had seen Dr. James in clinic in early feb and it was decided to remove the catheter. Since the removal, he'd reported not feeling well with low grade temps ~100.2.  He was admitted with a suspicion for bacterial infection the workup for which was negative.  Discharged 2/16/18, seen in clinic 2/20/18 with Dr Ferreira.     Mr. Crocker presents with approximately 3 weeks of worsening diarrhea and 2-3 days of sinus pressure, drainage, and worsened cough.  He notes that he had a coughing fit last night and passed out, coughed throughout most of the night.  This also happened on 2/25/18.  He last saw Dr Ferreira in clinic on 2/20/18 where he was taken off myfortic and switched to cellcept (he hadn't been on cellcept because of leukopenia when they tried post-transplant).  Though his diarrhea had improved after his last discharge, he states it has increased now to 15x/day, clear/yellow/green, no fevers, no exacerbating foods per say.  He was taken back off the cellcept and placed back on myfortic this past week and has not noticed immediate change in diarrhea. He also reports his baseline coughing fits havent improved and are minimally responsive to tessalon pearls.  Came on last night, he lost  "consciousness during a fit once which is relatively normal for him, and had two more during HPI.  He notes no sick contacts but does state he's had some URI symptoms as above.  His baseline vitals are usually -120 and BP 90s/60s-110s/70s.  He reports a history of intermittent diarrhea - did have Cdiff many years ago but this smells different.  No abdominal pain, no nausea, no vomiting.  No blood in diarrhea.  Appears last c-scope in 2015 with no evidence of infection or inflammatory processes.  He does report IBS which is different that this. Psychiatry was consulted on 4/20/2018 to address depression, anxiety and hallucinations.    The patient states that he is treated for depression and anxiety as an outpatient. He has been in the hospital since March. He was originally admitted for GI issues, but later developed a lung infection. He states that his anxiety and depression have actually improved over the past two days. His mood is "alright" today. He denies panic attacks, history of manic/hypomanic episodes or a history of paranoia or delusions. Of note, he did have some significant confusion two days ago with hallucinations of "bugs crawling on the wall". This confusion has since resolved and the patient is oriented to person, place, time and situation today. He took Ramelteon as needed for insomnia two nights ago and states that it made him feel calm and get better sleep.He states that Lexapro is helpful for depression and anxiety. His primary team decreased his dose to 10 mg PO daily of Lexapro rather than his home dose of 20 mg daily during this hospitalization. He denies SI/HI/AVH today.      "

## 2018-04-20 NOTE — SUBJECTIVE & OBJECTIVE
"     Patient History           Medical as of 4/20/2018     Past Medical History     Diagnosis Date Comments Source    Arthritis -- -- Provider    Ascites -- Thought to be 2/2 heart tpx; liver bx 3/31/17 w/o significant fibrosis Provider    Cardiomyopathy -- -- Provider    Depression -- Controlled "for the most part" on Lexipro Provider    Encounter for blood transfusion -- during transplant Provider    GERD (gastroesophageal reflux disease) -- -- Provider    Hashimoto's disease -- -- Provider    History of CHF (congestive heart failure) -- Previously EF 20% (prior to heart transplant); last EF 60%, PAP 41 as of 12/12/17; throught to be 2/2 genetics & DM1 Provider    History of coronary artery disease -- H/o Coronary artery disease; resolved since heart transplant 2014 Provider    History of myocardial infarction -- h/o MI x3 prior to heart transplant Provider    HLD (hyperlipidemia) 6/11/2015 -- Provider    Hypoglycemia unawareness in type 1 diabetes mellitus -- -- Provider    Hypothyroid -- Initially hyperthyroid 2/2 Hoshimoto's thyroiditis; now on levothyroxine 150 mcg qd Provider    Pleural effusion due to another disorder -- Thought to be 2/2 heart tpx; s/p PleuRx catheter placement and removal after 1-2 months Provider    Pulmonary hypertension assoc with unclear multi-factorial mechanisms 12/12/2017 PAP 41 (EF 60%) on ECHO 12/12/17 Provider    Syncope 6/30/2015 -- Provider    Type I diabetes mellitus, well controlled -- Well controlled; Dx'd @7y/o; on N insulin 20U BID & Humalog 10U w/meals +SSI; Glu 89 11/9/17; A1c 7.2% 4/5/17 Provider    Unspecified essential hypertension 05/29/2014 Well controlled; Last /57 on 11/9/17 Provider          Pertinent Negatives     Diagnosis Date Noted Comments Source    *Atrial fibrillation 11/15/2012 -- Provider    *Atrial flutter 11/15/2012 -- Provider    AAA (abdominal aortic aneurysm) 11/15/2012 -- Provider    Acute coronary syndrome 11/15/2012 -- Provider    Asthma " 2/11/2018 -- Provider    Atrial flutter 12/15/2015 -- Provider    Carotid artery occlusion 11/15/2012 -- Provider    Clotting disorder 11/15/2012 -- Provider    Diabetes mellitus type II 10/22/2012 -- Provider    Heart block 11/15/2012 -- Provider    Heart murmur 11/15/2012 -- Provider    Sleep apnea 11/15/2012 -- Provider    Stenosis 11/15/2012 -- Provider    Stenosis of aortic and mitral valves 12/15/2015 -- Provider    Stroke 12/15/2015 -- Provider    Supraventricular tachycardia 11/15/2012 -- Provider    Valvular regurgitation 11/15/2012 -- Provider    Ventricular tachycardia 11/15/2012 -- Provider                  Surgical as of 4/20/2018     Past Surgical History     Procedure Laterality Date Comments Source    FOOT SPLIT TRANSFER OF THE POSTERIOR TIBIALIS TENDON PROCEDURE -- -- -- Provider    KNEE SURGERY -- -- -- Provider    CARDIAC SURGERY -- -- -- Provider    CARDIAC CATHETERIZATION -- -- -- Provider    CORONARY ANGIOPLASTY -- -- -- Provider    stent placemnet [Other] -- -- -- Provider    CHOLECYSTECTOMY -- -- -- Provider    s/p oht [Other] -- November 2014 -- Provider    HEART TRANSPLANT -- 2014 -- Provider    PleuRx catheter placement [Other] -- 01/11/2018 Plan for removal after 1-2 months by Dr. James in cardiothoracic surgery Provider    COLONOSCOPY N/A 3/13/2018 Procedure: COLONOSCOPY;  Surgeon: Tim Spencer MD;  Location: [address];  Service: Endoscopy;  Laterality: N/A; Provider                  Family as of 4/20/2018     Problem Relation Name Age of Onset Comments Source    Heart disease Mother -- -- -- Provider    Kidney disease Mother -- -- -- Provider    Diabetes Mother -- -- -- Provider    Hypertension Mother -- -- -- Provider    Kidney disease Father -- -- -- Provider    Diabetes Father -- -- -- Provider    Hypertension Father -- -- -- Provider    Stroke Father -- -- -- Provider    Heart disease Brother -- -- -- Provider    Stroke Brother -- -- -- Provider    Diabetes  Brother -- -- -- Provider    Cancer Brother -- 50 pancreatic Provider    Vision loss Brother -- -- -- Provider    Hypertension Brother -- -- -- Provider    Diabetes Son -- -- -- Provider    Diabetes Sister -- -- -- Provider    Diabetes Maternal Uncle -- -- -- Provider    Diabetes Paternal Aunt -- -- -- Provider    Diabetes Maternal Grandmother -- -- -- Provider    Diabetes Maternal Grandfather -- -- -- Provider            Tobacco Use as of 4/20/2018     Smoking Status Smoking Start Date Smoking Quit Date Packs/day Years Used    Never Smoker -- -- -- --    Types Comments Smokeless Tobacco Status Smokeless Tobacco Quit Date Source    -- -- Never Used -- Provider            Alcohol Use as of 4/20/2018     Alcohol Use Drinks/Week Alcohol/Week Comments Source    No -- -- -- Provider            Drug Use as of 4/20/2018     Drug Use Types Frequency Comments Source    No -- -- -- Provider            Sexual Activity as of 4/20/2018     Sexually Active Birth Control Partners Comments Source    Yes -- Female -- Provider            Activities of Daily Living as of 4/20/2018    **None**           Social Documentation as of 4/20/2018        Breakfast: Skips 80%; cereal; Diet Sprite  Lunch: Turkey or chicken sandwich on white bread; Diet Sprite  Dinner: Grilled burgers, small amount chips; Diet Sprite  Snacks: Ronny crackers before bed on most nights  Eats out: 2x/wk  Water: 0  PA: Walks 15 minutes (strenuous) daily  Goal weight 170-175 lbs by 1/2018  Source: Provider           Occupational as of 4/20/2018    **None**           Socioeconomic as of 4/20/2018     Marital Status Spouse Name Number of Children Years Education Preferred Language Ethnicity Race Source     -- -- -- English /White White --         Pertinent History Q A Comments    as of 4/20/2018 Lives with      Place in Birth Order      Lives in      Number of Siblings      Raised by      Legal Involvement      Childhood Trauma      Criminal  "History of      Financial Status      Highest Level of Education      Does patient have access to a firearm?       Service      Primary Leisure Activity      Spirituality       Past Medical History:   Diagnosis Date    Arthritis     Ascites     Thought to be 2/2 heart tpx; liver bx 3/31/17 w/o significant fibrosis    Cardiomyopathy     Depression     Controlled "for the most part" on Lexipro    Encounter for blood transfusion     during transplant    GERD (gastroesophageal reflux disease)     Hashimoto's disease     History of CHF (congestive heart failure)     Previously EF 20% (prior to heart transplant); last EF 60%, PAP 41 as of 12/12/17; throught to be 2/2 genetics & DM1    History of coronary artery disease     H/o Coronary artery disease; resolved since heart transplant 2014    History of myocardial infarction     h/o MI x3 prior to heart transplant    HLD (hyperlipidemia) 6/11/2015    Hypoglycemia unawareness in type 1 diabetes mellitus     Hypothyroid     Initially hyperthyroid 2/2 Hoshimoto's thyroiditis; now on levothyroxine 150 mcg qd    Pleural effusion due to another disorder     Thought to be 2/2 heart tpx; s/p PleuRx catheter placement and removal after 1-2 months    Pulmonary hypertension assoc with unclear multi-factorial mechanisms 12/12/2017    PAP 41 (EF 60%) on ECHO 12/12/17    Syncope 6/30/2015    Type I diabetes mellitus, well controlled     Well controlled; Dx'd @7y/o; on N insulin 20U BID & Humalog 10U w/meals +SSI; Glu 89 11/9/17; A1c 7.2% 4/5/17    Unspecified essential hypertension 05/29/2014    Well controlled; Last /57 on 11/9/17     Past Surgical History:   Procedure Laterality Date    CARDIAC CATHETERIZATION      CARDIAC SURGERY      CHOLECYSTECTOMY      COLONOSCOPY N/A 3/13/2018    Procedure: COLONOSCOPY;  Surgeon: Tim Spencer MD;  Location: [facility] ([address] [place] [address]);  Service: Endoscopy;  Laterality: N/A;    CORONARY ANGIOPLASTY      FOOT SPLIT " TRANSFER OF THE POSTERIOR TIBIALIS TENDON PROCEDURE      HEART TRANSPLANT  2014    KNEE SURGERY      PleuRx catheter placement  01/11/2018    Plan for removal after 1-2 months by Dr. James in cardiothoracic surgery    s/p oht  November 2014    stent placemnet       Family History     Problem Relation (Age of Onset)    Cancer Brother (50)    Diabetes Mother, Father, Brother, Son, Sister, Maternal Uncle, Paternal Aunt, Maternal Grandmother, Maternal Grandfather    Heart disease Mother, Brother    Hypertension Mother, Father, Brother    Kidney disease Mother, Father    Stroke Father, Brother    Vision loss Brother        Social History Main Topics    Smoking status: Never Smoker    Smokeless tobacco: Never Used    Alcohol use No    Drug use: No    Sexual activity: Yes     Partners: Female     Review of patient's allergies indicates:   Allergen Reactions    No known drug allergies        No current facility-administered medications on file prior to encounter.      Current Outpatient Prescriptions on File Prior to Encounter   Medication Sig    aspirin (ECOTRIN) 81 MG EC tablet Take 1 tablet (81 mg total) by mouth once daily.    escitalopram oxalate (LEXAPRO) 20 MG tablet Take 1 tablet (20 mg total) by mouth once daily.    gabapentin (NEURONTIN) 300 MG capsule Take 3 capsules (900 mg total) by mouth 3 (three) times daily.    insulin aspart (NOVOLOG) 100 unit/mL InPn pen Inject 4 Units into the skin 3 (three) times daily.    insulin detemir (LEVEMIR FLEXTOUCH) 100 unit/mL (3 mL) SubQ InPn pen Inject 15 Units into the skin every evening. (Patient taking differently: Inject 15 Units into the skin 2 (two) times daily. )    lancets Misc 1 Device by Misc.(Non-Drug; Combo Route) route 4 (four) times daily with meals and nightly.    levothyroxine (SYNTHROID) 150 MCG tablet Take 1 tablet (150 mcg total) by mouth every morning.    magnesium oxide (MAG-OX) 400 mg tablet Take 1 tablet (400 mg total) by mouth  "once daily.    mycophenolate (MYFORTIC) 360 MG TbEC Take 2 tablets (720 mg total) by mouth 2 (two) times daily.    nortriptyline (PAMELOR) 25 MG capsule Take 1 capsule (25 mg total) by mouth every evening.    ondansetron (ZOFRAN-ODT) 4 MG TbDL Take 2 tablets (8 mg total) by mouth every 8 (eight) hours as needed (nausea).    pantoprazole (PROTONIX) 40 MG tablet TAKE ONE TABLET BY MOUTH EVERY DAY    pravastatin (PRAVACHOL) 40 MG tablet Take 1 tablet (40 mg total) by mouth every evening. (Patient taking differently: Take 40 mg by mouth once daily. )    predniSONE (DELTASONE) 2.5 MG tablet Take 1 tablet (2.5 mg total) by mouth once daily. S/P Heart Transplant 11/18/2014 ICD-10 Z94.1    tacrolimus (PROGRAF) 1 MG Cap Taked 3mg orally in the morning and 3mg orally in the evening. S/p heart transplant  11/18/2014  ICD-10 Z94.1    tamsulosin (FLOMAX) 0.4 mg Cp24 TAKE 2 CAPSULES(0.8 MG) BY MOUTH EVERY EVENING    torsemide (DEMADEX) 20 MG Tab Take 2 tablets (40 mg total) by mouth once daily.    pen needle, diabetic 32 gauge x 5/32" Ndle Uses 5 pen needles a day     Psychotherapeutics     Start     Stop Route Frequency Ordered    04/18/18 0900  escitalopram oxalate tablet 10 mg      -- PER G TUBE Daily 04/17/18 1046        Review of Systems   Respiratory: Negative for shortness of breath.    Cardiovascular: Negative for chest pain.   Gastrointestinal: Negative for abdominal pain.     Strengths and Liabilities: Strength: Patient accepts guidance/feedback, Strength: Patient is expressive/articulate., Strength: Patient has positive support network., Liability: Patient has poor health.    Objective:     Vital Signs (Most Recent):  Temp: 97.5 °F (36.4 °C) (04/20/18 1345)  Pulse: 108 (04/20/18 1430)  Resp: 18 (04/20/18 1148)  BP: 112/61 (04/20/18 1148)  SpO2: 99 % (04/20/18 1148) Vital Signs (24h Range):  Temp:  [97.5 °F (36.4 °C)-98.4 °F (36.9 °C)] 97.5 °F (36.4 °C)  Pulse:  [] 108  Resp:  [16-18] 18  SpO2:  [96 " "%-100 %] 99 %  BP: (102-115)/(56-65) 112/61     Height: 5' 7" (170.2 cm)  Weight: 74.8 kg (164 lb 14.5 oz)  Body mass index is 25.83 kg/m².      Intake/Output Summary (Last 24 hours) at 04/20/18 1603  Last data filed at 04/20/18 0700   Gross per 24 hour   Intake           515.41 ml   Output               60 ml   Net           455.41 ml       Physical Exam     Significant Labs:   Last 24 Hours:   Recent Lab Results       04/20/18  0753 04/20/18  0425 04/20/18  0358 04/20/18  0004 04/19/18 2014      Immature Granulocytes   CANCELED  Comment:  Result canceled by the ancillary       Immature Grans (Abs)   CANCELED  Comment:  Mild elevation in immature granulocytes is non specific and   can be seen in a variety of conditions including stress response,   acute inflammation, trauma and pregnancy. Correlation with other   laboratory and clinical findings is essential.    Result canceled by the ancillary         Albumin   2.1(L)       Alkaline Phosphatase   208(H)       ALT   13       Anion Gap   12       Aniso   Slight       AST   16       BANDS   2.0       Baso #   CANCELED  Comment:  Result canceled by the ancillary       Basophilic Stippling   Occasional       Basophil%   1.0       Total Bilirubin   0.4  Comment:  For infants and newborns, interpretation of results should be based  on gestational age, weight and in agreement with clinical  observations.  Premature Infant recommended reference ranges:  Up to 24 hours.............<8.0 mg/dL  Up to 48 hours............<12.0 mg/dL  3-5 days..................<15.0 mg/dL  6-29 days.................<15.0 mg/dL         BUN, Bld   41(H)       Calcium   9.8       Chloride   99       CO2   23       Creatinine   2.8(H)       Differential Method   Manual       eGFR if    30.3(A)       eGFR if non    26.2  Comment:  Calculation used to obtain the estimated glomerular filtration  rate (eGFR) is the CKD-EPI equation.   (A)       Eos #   " CANCELED  Comment:  Result canceled by the ancillary       Eosinophil%   1.0       Glucose   140(H)       Gran%   63.0       Hematocrit   27.6(L)       Hemoglobin   8.4(L)       Lymph #   CANCELED  Comment:  Result canceled by the ancillary       Lymph%   20.0       MCH   31.0       MCHC   30.4(L)       MCV   102(H)       Mono #   CANCELED  Comment:  Result canceled by the ancillary       Mono%   13.0       MPV   9.8       nRBC   0       Ovalocytes   Occasional       Phosphorus   3.7       Platelets   224       POCT Glucose 137(H) 162(H)  178(H) 204(H)     Poik   Slight       Poly   Occasional       Potassium   5.6  Comment:  *No Visible Hemolysis(H)       Total Protein   6.4       RBC   2.71(L)       RDW   19.2(H)       Sodium   134(L)       Tacrolimus Lvl   10.7  Comment:  Testing performed by Liquid Chromatography-Tandem  Mass Spectrometry (LC-MS/MS).  This test was developed and its performance characteristics  determined by [facility], Department of Pathology  and Laboratory Medicine in a manner consistent with CLIA  requirements. It has not been cleared or approved by the [place]  Food and Drug Administration.  This test is used for clinical   purposes.  It should not be regarded as investigational or for  research.         WBC   3.30(L)                       Significant Imaging: None

## 2018-04-20 NOTE — ASSESSMENT & PLAN NOTE
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 Ischemic changes to right second toe and left hallux  -  Wound care following  -  Podiatry following

## 2018-04-20 NOTE — SUBJECTIVE & OBJECTIVE
Interval History:   NEERAJ, reporting that he slept well last night and in good spirits.  Original plan was for patient to have SLED at night on MWF to help with therapy during the day, but unfortunately, patient is hyperkalemic this morning and TSU staffing is unable to accommodate SLED until 1900.  Plan to provide HD today in the [facility] and to start back with original plan of nocturnal SLED on Monday.    Review of patient's allergies indicates:   Allergen Reactions    No known drug allergies      Current Facility-Administered Medications   Medication Frequency    0.9%  NaCl infusion PRN    0.9%  NaCl infusion PRN    0.9%  NaCl infusion Once    acetaminophen oral solution 999.0148 mg TID    cetirizine tablet 5 mg Daily    chlorhexidine 0.12 % solution 15 mL BID    epoetin jose miguel injection 4,000 Units Every Mon, Wed, Fri    escitalopram oxalate tablet 10 mg Daily    famotidine tablet 20 mg QHS    heparin (porcine) injection 1,000 Units PRN    heparin (porcine) injection 5,000 Units Q12H    insulin aspart U-100 pen 0-4 Units Q4H PRN    insulin regular (Humulin R) 100 Units in sodium chloride 0.9% 100 mL infusion Continuous    levothyroxine injection 75 mcg Daily    midodrine tablet 10 mg PRN    midodrine tablet 2.5 mg TID    nitroGLYCERIN 2% TD oint ointment 0.5 inch Q12H    ondansetron disintegrating tablet 4 mg Q6H PRN    oxyCODONE 5 mg/5 mL solution 5 mg Q6H PRN    polyethylene glycol packet 17 g Daily    povidone-iodine 10 % ointment PRN    predniSONE tablet 5 mg Daily    sennosides 8.8 mg/5 ml syrup 5 mL QHS    tacrolimus capsule 1 mg Daily    [START ON 4/21/2018] tacrolimus capsule 2 mg Daily       Objective:     Vital Signs (Most Recent):  Temp: 97.5 °F (36.4 °C) (04/20/18 1345)  Pulse: 110 (04/20/18 1600)  Resp: 18 (04/20/18 1600)  BP: 115/62 (04/20/18 1600)  SpO2: 99 % (04/20/18 1148)  O2 Device (Oxygen Therapy): room air (04/20/18 1148) Vital Signs (24h Range):  Temp:  [97.5 °F (36.4  °C)-98.4 °F (36.9 °C)] 97.5 °F (36.4 °C)  Pulse:  [] 110  Resp:  [16-18] 18  SpO2:  [96 %-100 %] 99 %  BP: ()/(43-65) 115/62     Weight: 74.8 kg (164 lb 14.5 oz) (04/20/18 1100)  Body mass index is 25.83 kg/m².  Body surface area is 1.88 meters squared.    I/O last 3 completed shifts:  In: 996.2 [I.V.:76.2; NG/GT:920]  Out: 60 [Drains:60]    Physical Exam   Constitutional: He appears well-developed and well-nourished. No distress.   HENT:   Head: Normocephalic and atraumatic.   Eyes: Conjunctivae and EOM are normal.   Cardiovascular: Regular rhythm.  Tachycardia present.    Pulmonary/Chest: He has no wheezes. He has no rales.   Abdominal: Soft. He exhibits no distension.   Musculoskeletal: He exhibits edema. He exhibits no tenderness or deformity.   Neurological: He is alert.   Skin: Skin is warm and dry. He is not diaphoretic.       Significant Labs:  CBC:   Recent Labs  Lab 04/20/18  0358   WBC 3.30*   RBC 2.71*   HGB 8.4*   HCT 27.6*      *   MCH 31.0   MCHC 30.4*     CMP:   Recent Labs  Lab 04/20/18  0358   *   CALCIUM 9.8   ALBUMIN 2.1*   PROT 6.4   *   K 5.6*   CO2 23   CL 99   BUN 41*   CREATININE 2.8*   ALKPHOS 208*   ALT 13   AST 16   BILITOT 0.4

## 2018-04-20 NOTE — SUBJECTIVE & OBJECTIVE
"Interval HPI:   Overnight events: none  Eating:   NPO  Nausea: No  Hypoglycemia and intervention: No  Fever: No  TPN and/or TF: Yes  If yes, type of TF/TPN and rate: 50cc/hr    BP (!) 104/56 (BP Location: Right arm, Patient Position: Lying)   Pulse 100   Temp 97.7 °F (36.5 °C) (Oral)   Resp 18   Ht 5' 7" (1.702 m)   Wt 74.8 kg (164 lb 14.5 oz)   SpO2 100%   BMI 25.83 kg/m²     Labs Reviewed and Include      Recent Labs  Lab 04/20/18  0358   *   CALCIUM 9.8   ALBUMIN 2.1*   PROT 6.4   *   K 5.6*   CO2 23   CL 99   BUN 41*   CREATININE 2.8*   ALKPHOS 208*   ALT 13   AST 16   BILITOT 0.4     Lab Results   Component Value Date    WBC 3.30 (L) 04/20/2018    HGB 8.4 (L) 04/20/2018    HCT 27.6 (L) 04/20/2018     (H) 04/20/2018     04/20/2018       Recent Labs  Lab 04/15/18  0743   TSH 12.752*   FREET4 0.94     Lab Results   Component Value Date    HGBA1C 5.1 04/05/2018       Nutritional status:   Body mass index is 25.83 kg/m².  Lab Results   Component Value Date    ALBUMIN 2.1 (L) 04/20/2018    ALBUMIN 2.3 (L) 04/19/2018    ALBUMIN 2.4 (L) 04/18/2018     Lab Results   Component Value Date    PREALBUMIN 13 (L) 04/08/2018    PREALBUMIN 5 (L) 03/15/2018    PREALBUMIN 18 (L) 03/24/2015       Estimated Creatinine Clearance: 31.5 mL/min (A) (based on SCr of 2.8 mg/dL (H)).    Accu-Checks  Recent Labs      04/18/18   2004  04/19/18   0021  04/19/18   0414  04/19/18   0750  04/19/18   1202  04/19/18   1603  04/19/18 2014 04/20/18   0004  04/20/18   0425  04/20/18   0753   POCTGLUCOSE  221*  241*  175*  215*  222*  207*  204*  178*  162*  137*       Current Medications and/or Treatments Impacting Glycemic Control  Immunotherapy:  Immunosuppressants         Stop Route Frequency     tacrolimus capsule 2 mg      -- SL 2 times daily        Steroids:   Hormones     Start     Stop Route Frequency Ordered    04/19/18 0900  predniSONE tablet 5 mg      -- PER G TUBE Daily 04/18/18 1106        Pressors:  "   Autonomic Drugs     Start     Stop Route Frequency Ordered    04/13/18 0859  midodrine tablet 10 mg      -- Oral As needed (PRN) 04/13/18 0800    04/09/18 1500  midodrine tablet 2.5 mg      -- Oral 3 times daily 04/09/18 1006        Hyperglycemia/Diabetes Medications: Antihyperglycemics     Start     Stop Route Frequency Ordered    04/11/18 0015  insulin regular (Humulin R) 100 Units in sodium chloride 0.9% 100 mL infusion     Question:  Insulin Rate Adjustment (DO NOT MODIFY ANSWER)  Answer:  [email or web address]    -- IV Continuous 04/10/18 2313    04/10/18 2221  insulin aspart U-100 pen 0-4 Units      -- SubQ Every 4 hours PRN 04/10/18 2122

## 2018-04-20 NOTE — SUBJECTIVE & OBJECTIVE
Interval History 4/20/2018:  Patient is seen for follow-up rehab evaluation and recommendations: No acute events over night.  Off trach collar, now tolerating RA.  Participated with therapy with therapy yesterday.  Psych consult pending.  Podiatry following.  Barriers for discharge/rehab admission: not medically ready for discharge     HPI, Past Medical, Family, and Social History remains the same as documented in the initial encounter.    Scheduled Medications:    acetaminophen  999.0148 mg Per G Tube TID    cetirizine  5 mg Per G Tube Daily    chlorhexidine  15 mL Mouth/Throat BID    epoetin jose miguel (PROCRIT) injection  4,000 Units Intravenous Every Mon, Wed, Fri    escitalopram oxalate  10 mg Per G Tube Daily    famotidine  20 mg Per G Tube QHS    heparin (porcine)  5,000 Units Subcutaneous Q12H    levothyroxine  75 mcg Intravenous Daily    midodrine  2.5 mg Oral TID    nitroGLYCERIN 2% TD oint  0.5 inch Topical (Top) Q12H    polyethylene glycol  17 g Oral Daily    predniSONE  5 mg Per G Tube Daily    sennosides 8.8 mg/5 ml  5 mL Oral QHS    tacrolimus  1 mg Sublingual Daily    [START ON 4/21/2018] tacrolimus  2 mg Sublingual Daily     PRN Medications: sodium chloride 0.9%, sodium chloride 0.9%, heparin (porcine), insulin aspart U-100, midodrine, ondansetron, oxyCODONE, povidone-iodine    Review of Systems   Constitutional: Positive for activity change. Negative for chills, fatigue and fever.   HENT: Negative for drooling, hearing loss, trouble swallowing and voice change.    Eyes: Negative for pain and visual disturbance.   Respiratory: Negative for cough and wheezing.         + trach   Cardiovascular: Negative for chest pain and palpitations.   Gastrointestinal: Negative for abdominal pain, nausea and vomiting.        + PEG   Genitourinary: Negative for difficulty urinating and flank pain.   Musculoskeletal: Positive for gait problem and myalgias. Negative for arthralgias, back pain and neck pain.    Skin: Positive for color change and wound. Negative for rash.   Allergic/Immunologic: Positive for immunocompromised state.   Neurological: Positive for weakness. Negative for dizziness, numbness and headaches.   Psychiatric/Behavioral: Positive for hallucinations and sleep disturbance. Negative for agitation. The patient is not nervous/anxious.      Objective:     Vital Signs (Most Recent):  Temp: 97.6 °F (36.4 °C) (18 0800)  Pulse: 105 (18 1129)  Resp: 18 (18 0800)  BP: (!) 115/56 (18 0800)  SpO2: 97 % (18 1100)    Vital Signs (24h Range):  Temp:  [97.6 °F (36.4 °C)-98.4 °F (36.9 °C)] 97.6 °F (36.4 °C)  Pulse:  [] 105  Resp:  [16-18] 18  SpO2:  [96 %-100 %] 97 %  BP: (102-115)/(51-65) 115/56     Physical Exam   Constitutional: He is oriented to person, place, and time. He appears well-developed. He appears ill. No distress.   HENT:   Head: Normocephalic and atraumatic.   Right Ear: External ear normal.   Left Ear: External ear normal.   Nose: Nose normal.   Eyes: Right eye exhibits no discharge. Left eye exhibits no discharge. No scleral icterus.   Neck: Neck supple.   Trach intact.    Cardiovascular: Normal rate, regular rhythm and intact distal pulses.    Pulmonary/Chest: Effort normal. No respiratory distress. He has no wheezes.   Abdominal: Soft. He exhibits no distension. There is no tenderness.   PEG intact, incision LETICIA, CDI   Musculoskeletal: He exhibits no edema or tenderness.        Left hand: He exhibits decreased range of motion and swelling.   R foot: R great toe and R 3rd toe with necrotic tissue  L foot: small amount of necrotic tissue to L great toe  Overall deconditioning   Neurological: He is alert and oriented to person, place, and time.   -  Mental Status:  AAOx3.  Follows commands.  Answers correct age and .  Recent and remote memory intact.  -  Speech and language:  no aphasia or dysarthria.    -  Motor:  RUE: 2/5, 3/5 .  LUE: 2+/5, 3/5 .  RLE:  2/5, DF 3+/5, PF 3+/5.  LLE: 2/5, DF 2/5, PF 3+/5.  -  Sensory:  Intact to light touch and pin prick.   Skin: Skin is warm and dry. No rash noted.   Psychiatric: His behavior is normal. Thought content normal. His affect is blunt.   Vitals reviewed.    Diagnostic Results:   Labs: Reviewed  X-Ray: Reviewed  US: Reviewed  CT: Reviewed    NEUROLOGICAL EXAMINATION:     MENTAL STATUS   Oriented to person, place, and time.

## 2018-04-20 NOTE — PLAN OF CARE
Problem: Patient Care Overview  Goal: Plan of Care Review  Outcome: Ongoing (interventions implemented as appropriate)  Patient awake and alert. Oriented. Left FA PIV and UA midline cdi. Insulin at 1.3ml/hr per order. accuchecks q4hr. NPO. Tube feed at goal rate of 40ml/hr. LLQ peg tube cdi. Left chest perm cath cdi; plan for CRRT at bedside at night MWF. Went to HD this afternoon.Telemetry monitoring, ST.  Generalized edema. +6 shiley in place, o2 sat >96% RA, patient reports no sob at this time. Per speech patient to wear valve on and off throughout day. Left and right feet in heel boots for DTIs and low airloss overlay, frequent turning. Psychology consulted. Podiatry consulted;bilateral feet xray done yesterday evening and arterial ultrasound done this morning.  Medication cream applied to toes as ordered. Standard precautions maintained.

## 2018-04-20 NOTE — PROGRESS NOTES
"[facility]  Podiatry  Progress Note    Patient Name: Lv Crocker  MRN: [id number]  Admission Date: 3/11/2018  Hospital Length of Stay: 39 days  Attending Physician: Behzad Lara Jr.,*  Primary Care Provider: Philippe Mohr MD   Interval Hx: Patient Seen at bedside.  Patient complains of pain to toes    Scheduled Meds:   sodium chloride 0.9%   Intravenous Once    acetaminophen  999.0148 mg Per G Tube TID    cetirizine  5 mg Per G Tube Daily    chlorhexidine  15 mL Mouth/Throat BID    epoetin jose miguel (PROCRIT) injection  4,000 Units Intravenous Every Mon, Wed, Fri    escitalopram oxalate  10 mg Per G Tube Daily    famotidine  20 mg Per G Tube QHS    heparin (porcine)  5,000 Units Subcutaneous Q12H    levothyroxine  75 mcg Intravenous Daily    midodrine  2.5 mg Oral TID    nitroGLYCERIN 2% TD oint  0.5 inch Topical (Top) Q12H    polyethylene glycol  17 g Oral Daily    predniSONE  5 mg Per G Tube Daily    sennosides 8.8 mg/5 ml  5 mL Oral QHS    tacrolimus  1 mg Sublingual Daily    [START ON 4/21/2018] tacrolimus  2 mg Sublingual Daily     Continuous Infusions:   insulin (HUMAN R) infusion (adults) 1.3 Units/hr (04/20/18 0905)     PRN Meds:sodium chloride 0.9%, sodium chloride 0.9%, heparin (porcine), insulin aspart U-100, midodrine, ondansetron, oxyCODONE, povidone-iodine    Review of patient's allergies indicates:   Allergen Reactions    No known drug allergies         Past Medical History:   Diagnosis Date    Arthritis     Ascites     Thought to be 2/2 heart tpx; liver bx 3/31/17 w/o significant fibrosis    Cardiomyopathy     Depression     Controlled "for the most part" on Lexipro    Encounter for blood transfusion     during transplant    GERD (gastroesophageal reflux disease)     Hashimoto's disease     History of CHF (congestive heart failure)     Previously EF 20% (prior to heart transplant); last EF 60%, PAP 41 as of 12/12/17; throught to be 2/2 " genetics & DM1    History of coronary artery disease     H/o Coronary artery disease; resolved since heart transplant 2014    History of myocardial infarction     h/o MI x3 prior to heart transplant    HLD (hyperlipidemia) 6/11/2015    Hypoglycemia unawareness in type 1 diabetes mellitus     Hypothyroid     Initially hyperthyroid 2/2 Hoshimoto's thyroiditis; now on levothyroxine 150 mcg qd    Pleural effusion due to another disorder     Thought to be 2/2 heart tpx; s/p PleuRx catheter placement and removal after 1-2 months    Pulmonary hypertension assoc with unclear multi-factorial mechanisms 12/12/2017    PAP 41 (EF 60%) on ECHO 12/12/17    Syncope 6/30/2015    Type I diabetes mellitus, well controlled     Well controlled; Dx'd @7y/o; on N insulin 20U BID & Humalog 10U w/meals +SSI; Glu 89 11/9/17; A1c 7.2% 4/5/17    Unspecified essential hypertension 05/29/2014    Well controlled; Last /57 on 11/9/17     Past Surgical History:   Procedure Laterality Date    CARDIAC CATHETERIZATION      CARDIAC SURGERY      CHOLECYSTECTOMY      COLONOSCOPY N/A 3/13/2018    Procedure: COLONOSCOPY;  Surgeon: Tim Spencer MD;  Location: [facility] ([address] [place] [address]);  Service: Endoscopy;  Laterality: N/A;    CORONARY ANGIOPLASTY      FOOT SPLIT TRANSFER OF THE POSTERIOR TIBIALIS TENDON PROCEDURE      HEART TRANSPLANT  2014    KNEE SURGERY      PleuRx catheter placement  01/11/2018    Plan for removal after 1-2 months by Dr. James in cardiothoracic surgery    s/p oht  November 2014    stent placemnet         Family History     Problem Relation (Age of Onset)    Cancer Brother (50)    Diabetes Mother, Father, Brother, Son, Sister, Maternal Uncle, Paternal Aunt, Maternal Grandmother, Maternal Grandfather    Heart disease Mother, Brother    Hypertension Mother, Father, Brother    Kidney disease Mother, Father    Stroke Father, Brother    Vision loss Brother        Social History Main Topics    Smoking status:  Never Smoker    Smokeless tobacco: Never Used    Alcohol use No    Drug use: No    Sexual activity: Yes     Partners: Female     Review of Systems   Unable to perform ROS: Mental status change     Objective:     Vital Signs (Most Recent):  Temp: 97.9 °F (36.6 °C) (04/20/18 1148)  Pulse: 107 (04/20/18 1148)  Resp: 18 (04/20/18 1148)  BP: 112/61 (04/20/18 1148)  SpO2: 99 % (04/20/18 1148) Vital Signs (24h Range):  Temp:  [97.6 °F (36.4 °C)-98.4 °F (36.9 °C)] 97.9 °F (36.6 °C)  Pulse:  [] 107  Resp:  [16-18] 18  SpO2:  [96 %-100 %] 99 %  BP: (102-115)/(56-65) 112/61     Weight: 74.8 kg (164 lb 14.5 oz)  Body mass index is 25.83 kg/m².    Foot Exam    General  Orientation: disoriented       Right Foot/Ankle     Inspection and Palpation  Tenderness: great toe metatarsophalangeal joint   Skin Exam: skin changes and abnormal color;     Neurovascular  Dorsalis pedis: 1+  Posterior tibial: 1+  Saphenous nerve sensation: diminished  Tibial nerve sensation: diminished  Superficial peroneal nerve sensation: diminished  Deep peroneal nerve sensation: diminished  Sural nerve sensation: diminished      Left Foot/Ankle      Inspection and Palpation  Tenderness: great toe interphalangeal joint and great toe metatarsophalangeal joint   Skin Exam: skin changes;     Neurovascular  Dorsalis pedis: 1+  Posterior tibial: 1+  Saphenous nerve sensation: diminished  Tibial nerve sensation: diminished  Superficial peroneal nerve sensation: diminished  Deep peroneal nerve sensation: diminished  Sural nerve sensation: diminished          Gangrene noted to hallux and 3rd toe. Stable no purulent drainage noted. Ischemic changes noted to right second toe. Pain upon palpation to right hallux and 3rd toe.           Pre ulcerative lesion right posterior leg.       Mild ischemic changes noted to left hallux. CFT <3s. No purulent drainage noted.             Preulcerative lesion left heel.           Laboratory:  CBC:     Recent Labs  Lab  04/20/18  0358   WBC 3.30*   RBC 2.71*   HGB 8.4*   HCT 27.6*      *   MCH 31.0   MCHC 30.4*     CMP:     Recent Labs  Lab 04/20/18  0358   *   CALCIUM 9.8   ALBUMIN 2.1*   PROT 6.4   *   K 5.6*   CO2 23   CL 99   BUN 41*   CREATININE 2.8*   ALKPHOS 208*   ALT 13   AST 16   BILITOT 0.4       Diagnostic Results:  Xray B/L feet ordered    Clinical Findings:  Dry stable gangrene noted to right hallux and 3rd toe. Ischemic changes noted to right 2nd and left hallux.     Assessment/Plan:     Gangrene    Lv Crocker is a 42 y.o. 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 Ischemic changes to right second toe and left hallux.   -SULMA's within normal limits  -x-ray not concerning for infection  -Painted with betadine, nursing to paint toes with betadine daily  -Per transplant service, recommend against surgical intervention due to inmunosupressed state.  Agree with transplant service.  Toes stable, no surgical intervention indicated at this time.  We will watch patient from a distance while inpatient.      DC instructions: patient to follow up with podiatry within one week of discharge.  Patient to have toes painted with betadine daily.    Will return tomorrow to discuss results.     Weightbearing Status: WBAT B/L  Offloading Device: DARCO shoe.             Heart transplanted    Per primary            Aston Head MD  Podiatry  [facility]

## 2018-04-20 NOTE — SUBJECTIVE & OBJECTIVE
"Interval Hx: Patient Seen at bedside.  Patient complains of pain to toes    Scheduled Meds:   sodium chloride 0.9%   Intravenous Once    acetaminophen  999.0148 mg Per G Tube TID    cetirizine  5 mg Per G Tube Daily    chlorhexidine  15 mL Mouth/Throat BID    epoetin jose miguel (PROCRIT) injection  4,000 Units Intravenous Every Mon, Wed, Fri    escitalopram oxalate  10 mg Per G Tube Daily    famotidine  20 mg Per G Tube QHS    heparin (porcine)  5,000 Units Subcutaneous Q12H    levothyroxine  75 mcg Intravenous Daily    midodrine  2.5 mg Oral TID    nitroGLYCERIN 2% TD oint  0.5 inch Topical (Top) Q12H    polyethylene glycol  17 g Oral Daily    predniSONE  5 mg Per G Tube Daily    sennosides 8.8 mg/5 ml  5 mL Oral QHS    tacrolimus  1 mg Sublingual Daily    [START ON 4/21/2018] tacrolimus  2 mg Sublingual Daily     Continuous Infusions:   insulin (HUMAN R) infusion (adults) 1.3 Units/hr (04/20/18 0905)     PRN Meds:sodium chloride 0.9%, sodium chloride 0.9%, heparin (porcine), insulin aspart U-100, midodrine, ondansetron, oxyCODONE, povidone-iodine    Review of patient's allergies indicates:   Allergen Reactions    No known drug allergies         Past Medical History:   Diagnosis Date    Arthritis     Ascites     Thought to be 2/2 heart tpx; liver bx 3/31/17 w/o significant fibrosis    Cardiomyopathy     Depression     Controlled "for the most part" on Lexipro    Encounter for blood transfusion     during transplant    GERD (gastroesophageal reflux disease)     Hashimoto's disease     History of CHF (congestive heart failure)     Previously EF 20% (prior to heart transplant); last EF 60%, PAP 41 as of 12/12/17; throught to be 2/2 genetics & DM1    History of coronary artery disease     H/o Coronary artery disease; resolved since heart transplant 2014    History of myocardial infarction     h/o MI x3 prior to heart transplant    HLD (hyperlipidemia) 6/11/2015    Hypoglycemia unawareness in " type 1 diabetes mellitus     Hypothyroid     Initially hyperthyroid 2/2 Hoshimoto's thyroiditis; now on levothyroxine 150 mcg qd    Pleural effusion due to another disorder     Thought to be 2/2 heart tpx; s/p PleuRx catheter placement and removal after 1-2 months    Pulmonary hypertension assoc with unclear multi-factorial mechanisms 12/12/2017    PAP 41 (EF 60%) on ECHO 12/12/17    Syncope 6/30/2015    Type I diabetes mellitus, well controlled     Well controlled; Dx'd @5y/o; on N insulin 20U BID & Humalog 10U w/meals +SSI; Glu 89 11/9/17; A1c 7.2% 4/5/17    Unspecified essential hypertension 05/29/2014    Well controlled; Last /57 on 11/9/17     Past Surgical History:   Procedure Laterality Date    CARDIAC CATHETERIZATION      CARDIAC SURGERY      CHOLECYSTECTOMY      COLONOSCOPY N/A 3/13/2018    Procedure: COLONOSCOPY;  Surgeon: Tim Spencer MD;  Location: [facility] ([address] [place] [address]);  Service: Endoscopy;  Laterality: N/A;    CORONARY ANGIOPLASTY      FOOT SPLIT TRANSFER OF THE POSTERIOR TIBIALIS TENDON PROCEDURE      HEART TRANSPLANT  2014    KNEE SURGERY      PleuRx catheter placement  01/11/2018    Plan for removal after 1-2 months by Dr. James in cardiothoracic surgery    s/p oht  November 2014    stent placemnet         Family History     Problem Relation (Age of Onset)    Cancer Brother (50)    Diabetes Mother, Father, Brother, Son, Sister, Maternal Uncle, Paternal Aunt, Maternal Grandmother, Maternal Grandfather    Heart disease Mother, Brother    Hypertension Mother, Father, Brother    Kidney disease Mother, Father    Stroke Father, Brother    Vision loss Brother        Social History Main Topics    Smoking status: Never Smoker    Smokeless tobacco: Never Used    Alcohol use No    Drug use: No    Sexual activity: Yes     Partners: Female     Review of Systems   Unable to perform ROS: Mental status change     Objective:     Vital Signs (Most Recent):  Temp: 97.9 °F (36.6 °C)  (04/20/18 1148)  Pulse: 107 (04/20/18 1148)  Resp: 18 (04/20/18 1148)  BP: 112/61 (04/20/18 1148)  SpO2: 99 % (04/20/18 1148) Vital Signs (24h Range):  Temp:  [97.6 °F (36.4 °C)-98.4 °F (36.9 °C)] 97.9 °F (36.6 °C)  Pulse:  [] 107  Resp:  [16-18] 18  SpO2:  [96 %-100 %] 99 %  BP: (102-115)/(56-65) 112/61     Weight: 74.8 kg (164 lb 14.5 oz)  Body mass index is 25.83 kg/m².    Foot Exam    General  Orientation: disoriented       Right Foot/Ankle     Inspection and Palpation  Tenderness: great toe metatarsophalangeal joint   Skin Exam: skin changes and abnormal color;     Neurovascular  Dorsalis pedis: 1+  Posterior tibial: 1+  Saphenous nerve sensation: diminished  Tibial nerve sensation: diminished  Superficial peroneal nerve sensation: diminished  Deep peroneal nerve sensation: diminished  Sural nerve sensation: diminished      Left Foot/Ankle      Inspection and Palpation  Tenderness: great toe interphalangeal joint and great toe metatarsophalangeal joint   Skin Exam: skin changes;     Neurovascular  Dorsalis pedis: 1+  Posterior tibial: 1+  Saphenous nerve sensation: diminished  Tibial nerve sensation: diminished  Superficial peroneal nerve sensation: diminished  Deep peroneal nerve sensation: diminished  Sural nerve sensation: diminished          Gangrene noted to hallux and 3rd toe. Stable no purulent drainage noted. Ischemic changes noted to right second toe. Pain upon palpation to right hallux and 3rd toe.           Pre ulcerative lesion right posterior leg.       Mild ischemic changes noted to left hallux. CFT <3s. No purulent drainage noted.             Preulcerative lesion left heel.           Laboratory:  CBC:     Recent Labs  Lab 04/20/18  0358   WBC 3.30*   RBC 2.71*   HGB 8.4*   HCT 27.6*      *   MCH 31.0   MCHC 30.4*     CMP:     Recent Labs  Lab 04/20/18  0358   *   CALCIUM 9.8   ALBUMIN 2.1*   PROT 6.4   *   K 5.6*   CO2 23   CL 99   BUN 41*   CREATININE 2.8*    ALKPHOS 208*   ALT 13   AST 16   BILITOT 0.4       Diagnostic Results:  Xray B/L feet ordered    Clinical Findings:  Dry stable gangrene noted to right hallux and 3rd toe. Ischemic changes noted to right 2nd and left hallux.

## 2018-04-20 NOTE — PROGRESS NOTES
[facility]  Nephrology  Progress Note    Patient Name: Lv Crocker  MRN: [id number]  Admission Date: 3/11/2018  Hospital Length of Stay: 39 days  Attending Provider: Behzad Lara Jr.,*   Primary Care Physician: Philippe Mohr MD  Principal Problem:Acute respiratory failure with hypoxia    Subjective:     HPI: Mr. Crocker is a 41 yo WM with T1DM, Hashimoto thyroiditis, h/o OHTx 11/2014, and CKD stage 4 who was admitted on 3/11/18 for persistent diarrhea. He reported a 3 week history of diarrhea up to 15x/day. Associated symptoms including URI symptoms, coughing fits, and coughing syncope. Prograf level was 14.3. His post-heart transplant course has been complicated by [facility]R 4/2015, CMV, post-transplant restrictive cardiomyopathy, recurrent pleural effusions/ascites requiring monthly thoracenteses/paracenteses, VATS 1/11/18 with Pleur-X catheter (now removed), and CKD stage 4 with baseline sCr 2.6-3.0. Baseline -120s and baseline BP 90s-110s/60-70s. Upon admission he was started on IVF and diarrhea workup was initiated. On 3/12 he was noted to have decreased UOP despite fluids; NS was increased to 100cc/hr. He was scheduled for a colonoscopy on 3/13 however procedure was cancelled due to hypoxia and fever. He was transferred to the ICU for closer monitoring. He continued to have oliguria overnight. Bladder scan revealed 200cc of urine but patient refused osullivan catheter placement. Wife reports that patient always has a hard time urinating again after osullivan catheters are placed/removed so he refuses them. He remained hypoxic despite FiO2 70% and ABG revealed combined respiratory and metabolic acidosis with pH 7.17. He was started on BiPAP as well as a bicarb infusion at 50cc/hr. ABG with mild improvement. AM labs revealed K 6.2 and he was shifted and given kayexalate.Trialysis catheter was placed this morning and he subsequently developed hypotension and was started on levophed.  He was intubated later this morning. Nephrology consulted for CACHORRO. All history obtained by primary team and chart review as patient was intubated/sedated on exam. Consent for dialysis obtained by patient's wife and placed in chart. Of note, both of patient's parents were on dialysis.     Interval History:   NAEON, reporting that he slept well last night and in good spirits.  Original plan was for patient to have SLED at night on MWF to help with therapy during the day, but unfortunately, patient is hyperkalemic this morning and TSU staffing is unable to accommodate SLED until 1900.  Plan to provide HD today in the [facility] and to start back with original plan of nocturnal SLED on Monday.    Review of patient's allergies indicates:   Allergen Reactions    No known drug allergies      Current Facility-Administered Medications   Medication Frequency    0.9%  NaCl infusion PRN    0.9%  NaCl infusion PRN    0.9%  NaCl infusion Once    acetaminophen oral solution 999.0148 mg TID    cetirizine tablet 5 mg Daily    chlorhexidine 0.12 % solution 15 mL BID    epoetin jose miguel injection 4,000 Units Every Mon, Wed, Fri    escitalopram oxalate tablet 10 mg Daily    famotidine tablet 20 mg QHS    heparin (porcine) injection 1,000 Units PRN    heparin (porcine) injection 5,000 Units Q12H    insulin aspart U-100 pen 0-4 Units Q4H PRN    insulin regular (Humulin R) 100 Units in sodium chloride 0.9% 100 mL infusion Continuous    levothyroxine injection 75 mcg Daily    midodrine tablet 10 mg PRN    midodrine tablet 2.5 mg TID    nitroGLYCERIN 2% TD oint ointment 0.5 inch Q12H    ondansetron disintegrating tablet 4 mg Q6H PRN    oxyCODONE 5 mg/5 mL solution 5 mg Q6H PRN    polyethylene glycol packet 17 g Daily    povidone-iodine 10 % ointment PRN    predniSONE tablet 5 mg Daily    sennosides 8.8 mg/5 ml syrup 5 mL QHS    tacrolimus capsule 1 mg Daily    [START ON 4/21/2018] tacrolimus capsule 2 mg Daily        Objective:     Vital Signs (Most Recent):  Temp: 97.5 °F (36.4 °C) (04/20/18 1345)  Pulse: 110 (04/20/18 1600)  Resp: 18 (04/20/18 1600)  BP: 115/62 (04/20/18 1600)  SpO2: 99 % (04/20/18 1148)  O2 Device (Oxygen Therapy): room air (04/20/18 1148) Vital Signs (24h Range):  Temp:  [97.5 °F (36.4 °C)-98.4 °F (36.9 °C)] 97.5 °F (36.4 °C)  Pulse:  [] 110  Resp:  [16-18] 18  SpO2:  [96 %-100 %] 99 %  BP: ()/(43-65) 115/62     Weight: 74.8 kg (164 lb 14.5 oz) (04/20/18 1100)  Body mass index is 25.83 kg/m².  Body surface area is 1.88 meters squared.    I/O last 3 completed shifts:  In: 996.2 [I.V.:76.2; NG/GT:920]  Out: 60 [Drains:60]    Physical Exam   Constitutional: He appears well-developed and well-nourished. No distress.   HENT:   Head: Normocephalic and atraumatic.   Eyes: Conjunctivae and EOM are normal.   Cardiovascular: Regular rhythm.  Tachycardia present.    Pulmonary/Chest: He has no wheezes. He has no rales.   Abdominal: Soft. He exhibits no distension.   Musculoskeletal: He exhibits edema. He exhibits no tenderness or deformity.   Neurological: He is alert.   Skin: Skin is warm and dry. He is not diaphoretic.       Significant Labs:  CBC:   Recent Labs  Lab 04/20/18  0358   WBC 3.30*   RBC 2.71*   HGB 8.4*   HCT 27.6*      *   MCH 31.0   MCHC 30.4*     CMP:   Recent Labs  Lab 04/20/18  0358   *   CALCIUM 9.8   ALBUMIN 2.1*   PROT 6.4   *   K 5.6*   CO2 23   CL 99   BUN 41*   CREATININE 2.8*   ALKPHOS 208*   ALT 13   AST 16   BILITOT 0.4            Assessment/Plan:     Acute renal failure with acute tubular necrosis superimposed on stage 3 chronic kidney disease    - baseline sCr 2.6-3.0 consistent with CKD stage IV  - anuric CACHORRO; likely ischemic ATN from prolonged pre-renal state (diarrhea) in setting of prograf renal vasoconstriction; cannot r/o septic ATN or Prograf toxicity  - SLED started 3/14. TDC placed 4/4/18.   - remains anuric    Plan:  Continue procrit  TIW    3 hour HD treatment today for metabolic clearance/volume management.  UF goal 1-1.5L as tolerated.  Plan is to provide nocturnal SLED for metabolic clearance/volume management so patient can participate in therapy during the day.  May consider starting on Veltassa if he continues to have recurrent hyperkalemia (?prograf?)          Juaquin Ibrahim NP  Nephrology  [facility]  Pager:  [phone]

## 2018-04-20 NOTE — ASSESSMENT & PLAN NOTE
- Continue PTA  Escitalopram, will stop xanex and nortriptyline due to hallucinations and give sleep med and less pain meds  - consulted Psych for with mood and insomnia though much improved today

## 2018-04-20 NOTE — CONSULTS
[facility]  Psychiatry  Consult Note    Patient Name: Lv Crocker  MRN: [id number]   Code Status: Full Code  Admission Date: 3/11/2018  Hospital Length of Stay: 39 days  Attending Physician: Behzad Lara Jr.,*  Primary Care Provider: Philippe Mohr MD    Current Legal Status: N/A    Patient information was obtained from patient and spouse/SO.   Consults  Subjective:     Principal Problem:Acute respiratory failure with hypoxia    Chief Complaint:  Anxiety, depression, hallucinations     HPI: Per primary team H&P:  41 yo gentleman with hashimoto's thyroidits, DM1 on insulin, s/p OHTX 11/14 complicated by AMR, treated 04/15 with 5 days PP, IVIG x 2 doses, was scheduled for Rituximab on 5/1/15 but developed pancytopenia in April 2015 after which he got CMV and c. Dif at the same time.  Angiogram 2/16 with IVUS showed no evidence of CAV. 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 He was getting monthly paracentesis and thoracentesis until he underwent a VATS with pleurX catheter placement on 1/11/2018. He had been draining 700-800cc of fluid every 2 days until about 3 weeks ago, where the drainage had decreased.  Had seen Dr. James in clinic in early feb and it was decided to remove the catheter. Since the removal, he'd reported not feeling well with low grade temps ~100.2.  He was admitted with a suspicion for bacterial infection the workup for which was negative.  Discharged 2/16/18, seen in clinic 2/20/18 with Dr Ferreira.     Mr. Crocker presents with approximately 3 weeks of worsening diarrhea and 2-3 days of sinus pressure, drainage, and worsened cough.  He notes that he had a coughing fit last night and passed out, coughed throughout most of the night.  This also happened on 2/25/18.  He last saw Dr Ferreira in clinic on 2/20/18 where he was taken off myfortic and switched to  "cellcept (he hadn't been on cellcept because of leukopenia when they tried post-transplant).  Though his diarrhea had improved after his last discharge, he states it has increased now to 15x/day, clear/yellow/green, no fevers, no exacerbating foods per say.  He was taken back off the cellcept and placed back on myfortic this past week and has not noticed immediate change in diarrhea. He also reports his baseline coughing fits havent improved and are minimally responsive to tessalon pearls.  Came on last night, he lost consciousness during a fit once which is relatively normal for him, and had two more during HPI.  He notes no sick contacts but does state he's had some URI symptoms as above.  His baseline vitals are usually -120 and BP 90s/60s-110s/70s.  He reports a history of intermittent diarrhea - did have Cdiff many years ago but this smells different.  No abdominal pain, no nausea, no vomiting.  No blood in diarrhea.  Appears last c-scope in 2015 with no evidence of infection or inflammatory processes.  He does report IBS which is different that this. Psychiatry was consulted on 4/20/2018 to address depression, anxiety and hallucinations.    The patient states that he is treated for depression and anxiety as an outpatient. He has been in the hospital since March. He was originally admitted for GI issues, but later developed a lung infection. He states that his anxiety and depression have actually improved over the past two days. His mood is "alright" today. He denies panic attacks, history of manic/hypomanic episodes or a history of paranoia or delusions. Of note, he did have some significant confusion two days ago with hallucinations of "bugs crawling on the wall". This confusion has since resolved and the patient is oriented to person, place, time and situation today. He took Ramelteon as needed for insomnia two nights ago and states that it made him feel calm and get better sleep.He states that Lexapro " "is helpful for depression and anxiety. His primary team decreased his dose to 10 mg PO daily of Lexapro rather than his home dose of 20 mg daily during this hospitalization. He denies SI/HI/AVH today.        Hospital Course: No notes on file         Patient History           Medical as of 4/20/2018     Past Medical History     Diagnosis Date Comments Source    Arthritis -- -- Provider    Ascites -- Thought to be 2/2 heart tpx; liver bx 3/31/17 w/o significant fibrosis Provider    Cardiomyopathy -- -- Provider    Depression -- Controlled "for the most part" on Lexipro Provider    Encounter for blood transfusion -- during transplant Provider    GERD (gastroesophageal reflux disease) -- -- Provider    Hashimoto's disease -- -- Provider    History of CHF (congestive heart failure) -- Previously EF 20% (prior to heart transplant); last EF 60%, PAP 41 as of 12/12/17; throught to be 2/2 genetics & DM1 Provider    History of coronary artery disease -- H/o Coronary artery disease; resolved since heart transplant 2014 Provider    History of myocardial infarction -- h/o MI x3 prior to heart transplant Provider    HLD (hyperlipidemia) 6/11/2015 -- Provider    Hypoglycemia unawareness in type 1 diabetes mellitus -- -- Provider    Hypothyroid -- Initially hyperthyroid 2/2 Hoshimoto's thyroiditis; now on levothyroxine 150 mcg qd Provider    Pleural effusion due to another disorder -- Thought to be 2/2 heart tpx; s/p PleuRx catheter placement and removal after 1-2 months Provider    Pulmonary hypertension assoc with unclear multi-factorial mechanisms 12/12/2017 PAP 41 (EF 60%) on ECHO 12/12/17 Provider    Syncope 6/30/2015 -- Provider    Type I diabetes mellitus, well controlled -- Well controlled; Dx'd @7y/o; on N insulin 20U BID & Humalog 10U w/meals +SSI; Glu 89 11/9/17; A1c 7.2% 4/5/17 Provider    Unspecified essential hypertension 05/29/2014 Well controlled; Last /57 on 11/9/17 Provider          Pertinent Negatives     " Diagnosis Date Noted Comments Source    *Atrial fibrillation 11/15/2012 -- Provider    *Atrial flutter 11/15/2012 -- Provider    AAA (abdominal aortic aneurysm) 11/15/2012 -- Provider    Acute coronary syndrome 11/15/2012 -- Provider    Asthma 2/11/2018 -- Provider    Atrial flutter 12/15/2015 -- Provider    Carotid artery occlusion 11/15/2012 -- Provider    Clotting disorder 11/15/2012 -- Provider    Diabetes mellitus type II 10/22/2012 -- Provider    Heart block 11/15/2012 -- Provider    Heart murmur 11/15/2012 -- Provider    Sleep apnea 11/15/2012 -- Provider    Stenosis 11/15/2012 -- Provider    Stenosis of aortic and mitral valves 12/15/2015 -- Provider    Stroke 12/15/2015 -- Provider    Supraventricular tachycardia 11/15/2012 -- Provider    Valvular regurgitation 11/15/2012 -- Provider    Ventricular tachycardia 11/15/2012 -- Provider                  Surgical as of 4/20/2018     Past Surgical History     Procedure Laterality Date Comments Source    FOOT SPLIT TRANSFER OF THE POSTERIOR TIBIALIS TENDON PROCEDURE -- -- -- Provider    KNEE SURGERY -- -- -- Provider    CARDIAC SURGERY -- -- -- Provider    CARDIAC CATHETERIZATION -- -- -- Provider    CORONARY ANGIOPLASTY -- -- -- Provider    stent placemnet [Other] -- -- -- Provider    CHOLECYSTECTOMY -- -- -- Provider    s/p oht [Other] -- November 2014 -- Provider    HEART TRANSPLANT -- 2014 -- Provider    PleuRx catheter placement [Other] -- 01/11/2018 Plan for removal after 1-2 months by Dr. James in cardiothoracic surgery Provider    COLONOSCOPY N/A 3/13/2018 Procedure: COLONOSCOPY;  Surgeon: Tim Spencer MD;  [facility]: [facility] ([address] [place] [address]);  Service: Endoscopy;  Laterality: N/A; Provider                  Family as of 4/20/2018     Problem Relation Name Age of Onset Comments Source    Heart disease Mother -- -- -- Provider    Kidney disease Mother -- -- -- Provider    Diabetes Mother -- -- -- Provider    Hypertension Mother -- -- -- Provider    Kidney  disease Father -- -- -- Provider    Diabetes Father -- -- -- Provider    Hypertension Father -- -- -- Provider    Stroke Father -- -- -- Provider    Heart disease Brother -- -- -- Provider    Stroke Brother -- -- -- Provider    Diabetes Brother -- -- -- Provider    Cancer Brother -- 50 pancreatic Provider    Vision loss Brother -- -- -- Provider    Hypertension Brother -- -- -- Provider    Diabetes Son -- -- -- Provider    Diabetes Sister -- -- -- Provider    Diabetes Maternal Uncle -- -- -- Provider    Diabetes Paternal Aunt -- -- -- Provider    Diabetes Maternal Grandmother -- -- -- Provider    Diabetes Maternal Grandfather -- -- -- Provider            Tobacco Use as of 4/20/2018     Smoking Status Smoking Start Date Smoking Quit Date Packs/day Years Used    Never Smoker -- -- -- --    Types Comments Smokeless Tobacco Status Smokeless Tobacco Quit Date Source    -- -- Never Used -- Provider            Alcohol Use as of 4/20/2018     Alcohol Use Drinks/Week Alcohol/Week Comments Source    No -- -- -- Provider            Drug Use as of 4/20/2018     Drug Use Types Frequency Comments Source    No -- -- -- Provider            Sexual Activity as of 4/20/2018     Sexually Active Birth Control Partners Comments Source    Yes -- Female -- Provider            Activities of Daily Living as of 4/20/2018    **None**           Social Documentation as of 4/20/2018        Breakfast: Skips 80%; cereal; Diet Sprite  Lunch: Turkey or chicken sandwich on white bread; Diet Sprite  Dinner: Grilled burgers, small amount chips; Diet Sprite  Snacks: Ronny crackers before bed on most nights  Eats out: 2x/wk  Water: 0  PA: Walks 15 minutes (strenuous) daily  Goal weight 170-175 lbs by 1/2018  Source: Provider           Occupational as of 4/20/2018    **None**           Socioeconomic as of 4/20/2018     Marital Status Spouse Name Number of Children Years Education Preferred Language Ethnicity Race Source     -- -- -- English  "/White White --         Pertinent History Q A Comments    as of 4/20/2018 Lives with      Place in Birth Order      Lives in      Number of Siblings      Raised by      Legal Involvement      Childhood Trauma      Criminal History of      Financial Status      Highest Level of Education      Does patient have access to a firearm?       Service      Primary Leisure Activity      Spirituality       Past Medical History:   Diagnosis Date    Arthritis     Ascites     Thought to be 2/2 heart tpx; liver bx 3/31/17 w/o significant fibrosis    Cardiomyopathy     Depression     Controlled "for the most part" on Lexipro    Encounter for blood transfusion     during transplant    GERD (gastroesophageal reflux disease)     Hashimoto's disease     History of CHF (congestive heart failure)     Previously EF 20% (prior to heart transplant); last EF 60%, PAP 41 as of 12/12/17; throught to be 2/2 genetics & DM1    History of coronary artery disease     H/o Coronary artery disease; resolved since heart transplant 2014    History of myocardial infarction     h/o MI x3 prior to heart transplant    HLD (hyperlipidemia) 6/11/2015    Hypoglycemia unawareness in type 1 diabetes mellitus     Hypothyroid     Initially hyperthyroid 2/2 Hoshimoto's thyroiditis; now on levothyroxine 150 mcg qd    Pleural effusion due to another disorder     Thought to be 2/2 heart tpx; s/p PleuRx catheter placement and removal after 1-2 months    Pulmonary hypertension assoc with unclear multi-factorial mechanisms 12/12/2017    PAP 41 (EF 60%) on ECHO 12/12/17    Syncope 6/30/2015    Type I diabetes mellitus, well controlled     Well controlled; Dx'd @7y/o; on N insulin 20U BID & Humalog 10U w/meals +SSI; Glu 89 11/9/17; A1c 7.2% 4/5/17    Unspecified essential hypertension 05/29/2014    Well controlled; Last /57 on 11/9/17     Past Surgical History:   Procedure Laterality Date    CARDIAC CATHETERIZATION      CARDIAC " SURGERY      CHOLECYSTECTOMY      COLONOSCOPY N/A 3/13/2018    Procedure: COLONOSCOPY;  Surgeon: Tim Spencer MD;  Location: [facility] ([address] [place] [address]);  Service: Endoscopy;  Laterality: N/A;    CORONARY ANGIOPLASTY      FOOT SPLIT TRANSFER OF THE POSTERIOR TIBIALIS TENDON PROCEDURE      HEART TRANSPLANT  2014    KNEE SURGERY      PleuRx catheter placement  01/11/2018    Plan for removal after 1-2 months by Dr. James in cardiothoracic surgery    s/p oht  November 2014    stent placemnet       Family History     Problem Relation (Age of Onset)    Cancer Brother (50)    Diabetes Mother, Father, Brother, Son, Sister, Maternal Uncle, Paternal Aunt, Maternal Grandmother, Maternal Grandfather    Heart disease Mother, Brother    Hypertension Mother, Father, Brother    Kidney disease Mother, Father    Stroke Father, Brother    Vision loss Brother        Social History Main Topics    Smoking status: Never Smoker    Smokeless tobacco: Never Used    Alcohol use No    Drug use: No    Sexual activity: Yes     Partners: Female     Review of patient's allergies indicates:   Allergen Reactions    No known drug allergies        No current facility-administered medications on file prior to encounter.      Current Outpatient Prescriptions on File Prior to Encounter   Medication Sig    aspirin (ECOTRIN) 81 MG EC tablet Take 1 tablet (81 mg total) by mouth once daily.    escitalopram oxalate (LEXAPRO) 20 MG tablet Take 1 tablet (20 mg total) by mouth once daily.    gabapentin (NEURONTIN) 300 MG capsule Take 3 capsules (900 mg total) by mouth 3 (three) times daily.    insulin aspart (NOVOLOG) 100 unit/mL InPn pen Inject 4 Units into the skin 3 (three) times daily.    insulin detemir (LEVEMIR FLEXTOUCH) 100 unit/mL (3 mL) SubQ InPn pen Inject 15 Units into the skin every evening. (Patient taking differently: Inject 15 Units into the skin 2 (two) times daily. )    lancets Misc 1 Device by Misc.(Non-Drug; Combo Route)  "route 4 (four) times daily with meals and nightly.    levothyroxine (SYNTHROID) 150 MCG tablet Take 1 tablet (150 mcg total) by mouth every morning.    magnesium oxide (MAG-OX) 400 mg tablet Take 1 tablet (400 mg total) by mouth once daily.    mycophenolate (MYFORTIC) 360 MG TbEC Take 2 tablets (720 mg total) by mouth 2 (two) times daily.    nortriptyline (PAMELOR) 25 MG capsule Take 1 capsule (25 mg total) by mouth every evening.    ondansetron (ZOFRAN-ODT) 4 MG TbDL Take 2 tablets (8 mg total) by mouth every 8 (eight) hours as needed (nausea).    pantoprazole (PROTONIX) 40 MG tablet TAKE ONE TABLET BY MOUTH EVERY DAY    pravastatin (PRAVACHOL) 40 MG tablet Take 1 tablet (40 mg total) by mouth every evening. (Patient taking differently: Take 40 mg by mouth once daily. )    predniSONE (DELTASONE) 2.5 MG tablet Take 1 tablet (2.5 mg total) by mouth once daily. S/P Heart Transplant 11/18/2014 ICD-10 Z94.1    tacrolimus (PROGRAF) 1 MG Cap Taked 3mg orally in the morning and 3mg orally in the evening. S/p heart transplant  11/18/2014  ICD-10 Z94.1    tamsulosin (FLOMAX) 0.4 mg Cp24 TAKE 2 CAPSULES(0.8 MG) BY MOUTH EVERY EVENING    torsemide (DEMADEX) 20 MG Tab Take 2 tablets (40 mg total) by mouth once daily.    pen needle, diabetic 32 gauge x 5/32" Ndle Uses 5 pen needles a day     Psychotherapeutics     Start     Stop Route Frequency Ordered    04/18/18 0900  escitalopram oxalate tablet 10 mg      -- PER G TUBE Daily 04/17/18 1046        Review of Systems   Respiratory: Negative for shortness of breath.    Cardiovascular: Negative for chest pain.   Gastrointestinal: Negative for abdominal pain.     Strengths and Liabilities: Strength: Patient accepts guidance/feedback, Strength: Patient is expressive/articulate., Strength: Patient has positive support network., Liability: Patient has poor health.    Objective:     Vital Signs (Most Recent):  Temp: 97.5 °F (36.4 °C) (04/20/18 1345)  Pulse: 108 (04/20/18 " "1430)  Resp: 18 (04/20/18 1148)  BP: 112/61 (04/20/18 1148)  SpO2: 99 % (04/20/18 1148) Vital Signs (24h Range):  Temp:  [97.5 °F (36.4 °C)-98.4 °F (36.9 °C)] 97.5 °F (36.4 °C)  Pulse:  [] 108  Resp:  [16-18] 18  SpO2:  [96 %-100 %] 99 %  BP: (102-115)/(56-65) 112/61     Height: 5' 7" (170.2 cm)  Weight: 74.8 kg (164 lb 14.5 oz)  Body mass index is 25.83 kg/m².      Intake/Output Summary (Last 24 hours) at 04/20/18 1603  Last data filed at 04/20/18 0700   Gross per 24 hour   Intake           515.41 ml   Output               60 ml   Net           455.41 ml       Physical Exam     Significant Labs:   Last 24 Hours:   Recent Lab Results       04/20/18  0753 04/20/18  0425 04/20/18  0358 04/20/18  0004 04/19/18 2014      Immature Granulocytes   CANCELED  Comment:  Result canceled by the ancillary       Immature Grans (Abs)   CANCELED  Comment:  Mild elevation in immature granulocytes is non specific and   can be seen in a variety of conditions including stress response,   acute inflammation, trauma and pregnancy. Correlation with other   laboratory and clinical findings is essential.    Result canceled by the ancillary         Albumin   2.1(L)       Alkaline Phosphatase   208(H)       ALT   13       Anion Gap   12       Aniso   Slight       AST   16       BANDS   2.0       Baso #   CANCELED  Comment:  Result canceled by the ancillary       Basophilic Stippling   Occasional       Basophil%   1.0       Total Bilirubin   0.4  Comment:  For infants and newborns, interpretation of results should be based  on gestational age, weight and in agreement with clinical  observations.  Premature Infant recommended reference ranges:  Up to 24 hours.............<8.0 mg/dL  Up to 48 hours............<12.0 mg/dL  3-5 days..................<15.0 mg/dL  6-29 days.................<15.0 mg/dL         BUN, Bld   41(H)       Calcium   9.8       Chloride   99       CO2   23       Creatinine   2.8(H)       Differential Method   Manual    "    eGFR if    30.3(A)       eGFR if non    26.2  Comment:  Calculation used to obtain the estimated glomerular filtration  rate (eGFR) is the CKD-EPI equation.   (A)       Eos #   CANCELED  Comment:  Result canceled by the ancillary       Eosinophil%   1.0       Glucose   140(H)       Gran%   63.0       Hematocrit   27.6(L)       Hemoglobin   8.4(L)       Lymph #   CANCELED  Comment:  Result canceled by the ancillary       Lymph%   20.0       MCH   31.0       MCHC   30.4(L)       MCV   102(H)       Mono #   CANCELED  Comment:  Result canceled by the ancillary       Mono%   13.0       MPV   9.8       nRBC   0       Ovalocytes   Occasional       Phosphorus   3.7       Platelets   224       POCT Glucose 137(H) 162(H)  178(H) 204(H)     Poik   Slight       Poly   Occasional       Potassium   5.6  Comment:  *No Visible Hemolysis(H)       Total Protein   6.4       RBC   2.71(L)       RDW   19.2(H)       Sodium   134(L)       Tacrolimus Lvl   10.7  Comment:  Testing performed by Liquid Chromatography-Tandem  Mass Spectrometry (LC-MS/MS).  This test was developed and its performance characteristics  determined by [facility], Department of Pathology  and Laboratory Medicine in a manner consistent with CLIA  requirements. It has not been cleared or approved by the [place]  Food and Drug Administration.  This test is used for clinical   purposes.  It should not be regarded as investigational or for  research.         WBC   3.30(L)                       Significant Imaging: None    Assessment/Plan:     Anxiety    41 yo male S/P OHTx 11/18/2014, suspected restrictive versus constrictive CMP post-transplant as well as CKD stage IV that has resulted in ascites/pleural effusion, was getting monthly paracentesis and thoracentesis.  Mr. Crocker presented to the ED 3/11/18 with approximately 3 weeks of worsening diarrhea and 2-3 days of sinus pressure, drainage, and worsened cough.    -No need  for PEC or inpatient psychiatric hospitalization at this time as he is not a danger to himself or others and is not gravely disabled.   -Recommend increasing Lexapro back to 20 mg PO daily  -Recommend starting Seroquel 50 mg PO daily as needed for anxiety  -Would not recommend benzodiazepines or anticholinergics, which are deliriogenic             Total Time:  60 minutes      Tez Francis MD   Psychiatry  [facility]

## 2018-04-20 NOTE — PROGRESS NOTES
[facility]  Heart Transplant  Progress Note    Patient Name: vL Crocker  MRN: [id number]  Admission Date: 3/11/2018  Hospital Length of Stay: 39 days  Attending Physician: Behzad Lara Jr.,*  Primary Care Provider: Philippe Mohr MD  Principal Problem:Acute respiratory failure with hypoxia    Subjective:     Interval History: No complaints today. Slept well overnight and much more lucid today per patient and wife. SLED tonight. Psych at bedside.    Continuous Infusions:   insulin (HUMAN R) infusion (adults) 1.3 Units/hr (04/20/18 0905)     Scheduled Meds:   sodium chloride 0.9%   Intravenous Once    acetaminophen  999.0148 mg Per G Tube TID    cetirizine  5 mg Per G Tube Daily    chlorhexidine  15 mL Mouth/Throat BID    epoetin jose migule (PROCRIT) injection  4,000 Units Intravenous Every Mon, Wed, Fri    escitalopram oxalate  10 mg Per G Tube Daily    famotidine  20 mg Per G Tube QHS    heparin (porcine)  5,000 Units Subcutaneous Q12H    levothyroxine  75 mcg Intravenous Daily    midodrine  2.5 mg Oral TID    nitroGLYCERIN 2% TD oint  0.5 inch Topical (Top) Q12H    polyethylene glycol  17 g Oral Daily    predniSONE  5 mg Per G Tube Daily    sennosides 8.8 mg/5 ml  5 mL Oral QHS    tacrolimus  1 mg Sublingual Daily    [START ON 4/21/2018] tacrolimus  2 mg Sublingual Daily     PRN Meds:sodium chloride 0.9%, sodium chloride 0.9%, heparin (porcine), insulin aspart U-100, midodrine, ondansetron, oxyCODONE, povidone-iodine    Review of patient's allergies indicates:   Allergen Reactions    No known drug allergies      Objective:     Vital Signs (Most Recent):  Temp: 97.9 °F (36.6 °C) (04/20/18 1148)  Pulse: 107 (04/20/18 1148)  Resp: 18 (04/20/18 1148)  BP: 112/61 (04/20/18 1148)  SpO2: 99 % (04/20/18 1148) Vital Signs (24h Range):  Temp:  [97.6 °F (36.4 °C)-98.4 °F (36.9 °C)] 97.9 °F (36.6 °C)  Pulse:  [] 107  Resp:  [16-18] 18  SpO2:  [96 %-100 %] 99 %  BP:  (102-115)/(56-65) 112/61     Patient Vitals for the past 72 hrs (Last 3 readings):   Weight   04/20/18 0600 74.8 kg (164 lb 14.5 oz)     Body mass index is 25.83 kg/m².      Intake/Output Summary (Last 24 hours) at 04/20/18 1223  Last data filed at 04/20/18 0700   Gross per 24 hour   Intake           515.41 ml   Output               60 ml   Net           455.41 ml     Hemodynamic Parameters:     Physical Exam   Constitutional: He appears well-developed and well-nourished. No distress.   HENT:   Head: Normocephalic and atraumatic.   Eyes: Conjunctivae and EOM are normal.   Cardiovascular: Regular rhythm.  Tachycardia present.    Pulmonary/Chest: He has no wheezes. He has no rales.   Abdominal: Soft. He exhibits no distension.   Musculoskeletal: He exhibits edema. He exhibits no tenderness or deformity.   Neurological: He is alert.   Skin: Skin is warm and dry. He is not diaphoretic.     Significant Labs:  CBC:    Recent Labs  Lab 04/18/18  0534 04/19/18  0513 04/20/18  0358   WBC 3.21* 3.52* 3.30*   RBC 2.48* 2.57* 2.71*   HGB 7.8* 8.1* 8.4*   HCT 25.7* 25.2* 27.6*    189 224   * 98 102*   MCH 31.5* 31.5* 31.0   MCHC 30.4* 32.1 30.4*     BNP:  No results for input(s): BNP in the last 168 hours.    Invalid input(s): BNPTRIAGELBLO  CMP:    Recent Labs  Lab 04/18/18  0534 04/19/18  0512 04/20/18  0358   * 171* 140*   CALCIUM 9.9 9.2 9.8   ALBUMIN 2.4* 2.3* 2.1*   PROT 6.7 6.5 6.4   * 132* 134*   K 5.6* 4.5 5.6*   CO2 22* 23 23    100 99   BUN 46* 26* 41*   CREATININE 3.1* 2.1* 2.8*   ALKPHOS 230* 223* 208*   ALT 15 14 13   AST 16 19 16   BILITOT 0.5 0.5 0.4      Coagulation:   No results for input(s): PT, INR, APTT in the last 168 hours.  LDH:  No results for input(s): LDH in the last 72 hours.  Microbiology:  Microbiology Results (last 7 days)     Procedure Component Value Units Date/Time    Fungus culture [[id number]] Collected:  03/14/18 1137    Order Status:  Completed Specimen:   Bronchial Wash from Amniotic Fluid Updated:  04/18/18 8067     Fungus (Mycology) Culture No fungus isolated after 4 weeks          I have reviewed all pertinent labs within the past 24 hours.    Estimated Creatinine Clearance: 31.5 mL/min (A) (based on SCr of 2.8 mg/dL (H)).    Diagnostic Results:  I have reviewed and interpreted all pertinent imaging results/findings within the past 24 hours.    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 Most recently has had ongoing issues with his lungs, had gotten 2 thoracenteses, after which he underwent VATS 01/19/18 followed by pleurex catheter placement and effusion drainage 01/11/18, subsequently had it removed 02/07/18 after drainage had decreased despite multiple attempts to drain it. Has been having significant coughing fits that result in him being near-syncopal at times, although difficult to say if he has passed out or not- patient most recently was admitted to the hospital for increased AGUILAR, coughing and pre-syncope 02/11-02/14/18 at [facility].   Patient was started on antibiotics for suspected bacterial infection, with some diarrhea, bronchitis, and possible pneumonia. Ct chest showed some pleural fluid collection and after talking with Dr. James, we arranged for him to receive a diagnostic tap with IR, from which the fluid collection demonstrated no growth or significant findings at all really. Cell counts do not appear to have been sent but will recheck. Patient states since his hospital stay, yesterday had a significant coughing fit again, after which he nearly passed out, is being seen in clinic today for this. Denies any cardiopulmonary complaints, no leg swelling, has some abdominal swelling, which is moderate for him. Denies significant shortness of breath with mild  exertion, had 6MWT yesterday to see if he qualified for home O2, and his O2 sats actually improved after recovery from where he started initially. He and his wife admit he is in bed most days, not very active.     Mr. Crocker presented to the ED 3/11/18 with approximately 3 weeks of worsening diarrhea and 2-3 days of sinus pressure, drainage, and worsened cough.  He notes that he had a coughing fit last night and passed out, coughed throughout most of the night.  This also happened on 2/25/18.  He last saw Dr Ferreira in clinic on 2/20/18 where he was taken off myfortic and switched to cellcept (he hadn't been on cellcept because of leukopenia when they tried post-transplant).  Though his diarrhea had improved after his last discharge, he states it has increased now to 15x/day, clear/yellow/green, no fevers, no exacerbating foods per say.  He was taken back off the cellcept and placed back on myfortic this past week and has not noticed immediate change in diarrhea. He also reports his baseline coughing fits havent improved and are minimally responsive to tessalon pearls.  Came on last night, he lost consciousness during a fit once which is relatively normal for him, and had two more during HPI.  He notes no sick contacts but does state he's had some URI symptoms as above.  His baseline vitals are usually -120 and BP 90s/60s-110s/70s.  He reports a history of intermittent diarrhea - did have Cdiff many years ago but this smells different.  No abdominal pain, no nausea, no vomiting.  No blood in diarrhea.  Appears last c-scope in 2015 with no evidence of infection or inflammatory processes.  He does report IBS which is different that this.       * Acute respiratory failure with hypoxia    - Resolved.   - S/P Bronch and intubation 3/14 now post tracheostomy due to inability to extubate  - Pulmonology signed off. Completely weaned off vent.   - RSV positive early and completed course of Ribavarin/IVIG.  - ID had  been following. Completed 7 day course of Meropenem/Linezolid earlier in hospitalization  - Appreciate PT/OT/Wound care.        Alteration in skin integrity    - wound care following        Restrictive cardiomyopathy    - No changes from before.   - Has known history of recurrent ascites and pleural effusions   - S/P thoracentesis X 2, followed by VATS/pleurex cath placement (January 2018) with removal of pleurex (February 2018) and 3rd thoracentesis 2/14/18 with no significant findings on fluid removal.        Type 1 diabetes mellitus with stage 3 chronic kidney disease    - Appreciate Endocrine's recs  - Insulin gtt per endocrine recs        Hypothyroidism due to Hashimoto's thyroiditis    - Appreciate Endocrine's recs  - Continue Levothyroxine 150mcg per NG tube (adjusted over the weekend)        Acute renal failure with acute tubular necrosis superimposed on stage 3 chronic kidney disease    - Appreciate Nephrology recs.   - Continue middorine for pressure support  - HD per nephrology.    - plan for nocturnal SLED for better sleep hygiene and increased alertness during day for therapy. Can transition back to intermittent HD or afternoon HD once we have placement for patient.         Anemia    - Transfused 2 units of PRBCs on 4/11 during HD  - Nephrology has resumed ProCrit         Heart transplanted    - TTE 3/26/18 showed normal graft function but has known history of restrictive CM post transplant.  - Decrease pred , Tacro switched to SL as levels consistently low. Up today, adjust as needed for goal 6-8/10  - Cellcept remains on hold  - HD tonight        Debility    - PT/OT/SLP daily.   - PEG tube placed and tolerating feeds.   - Nutrition following.    - switched from glucerna to novasource renal (start at 10 and increase to goal of 40 as tolerated)        Anxiety    - Continue PTA  Escitalopram, will stop xanex and nortriptyline due to hallucinations and give sleep med and less pain meds  - consulted Psych  for with mood and insomnia though much improved today            Kelsea Ryan PA-C  Heart Transplant  [facility]

## 2018-04-20 NOTE — PLAN OF CARE
"- Pt admitted 3/11/18 with severe diarrhea and cough complicated by acute hypoxic respiratory failure with ICU transfer on 3/13/18 - stepped down 4/13/18. Pt is s/p tracheostomy and gastrostomy tube placement.  - Pt is a heart transplant recipient from 11/18/14.  - Pt doing well overnight. Pt has been able to stay on room air all night. Pt has trach collar oxygen at bedside if needed. No desaturations on room air noted.  - Pt's trach suctioned x 1 by this RN at 2000 and x 1 by Respiratory at 0400. Pt able to cough sputum through trach with encouragement.   - Pt received oxycodone oral solution at bedtime for foot pain.  - Pt will go to ultrasound today to evaluate bilateral LE for PAD. Pt's toes have gangrenous appearance. Wound care orders in place. Podiatry following - pt may require toe amputations.  - Pt is anuric and dialysis dependent. Per Nephrology, pt will be transitioned to bedside CRRT on a MWF schedule starting today.  - Tube feedings: Novasource Renal at 30 mL/hr (continuous). Pt's goal rate is 40 mL/hr but pt has declined increased rate due to previous issues with high residuals. Largest residual overnight 75 mL. Pt denies nausea. Pt is strictly NPO. All meds given via G-tube (elixir form or crushed).  - BG monitoring q4h - insulin drip at 1.5 Units/hr (pt is Type 1 diabetic) with sliding scale insulin given PRN for hyperglycemia.  - Due to pt c/o insomnia and ongoing issues with delirium likely 2/2 prolonged hospitalization, pt/spouse requested minimal interruptions between 2300 and 0600. Pt disoriented to time. Pt exhibits minimal independent extremity movements, but is alert. PT and OT seeing pt frequently.  - Discharge plan: "In a few weeks" per notes to LTAC vs. Rehab vs. SNF.  "

## 2018-04-20 NOTE — ASSESSMENT & PLAN NOTE
Lv Crocker is a 42 y.o. 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 Ischemic changes to right second toe and left hallux.   -SULMA's within normal limits  -x-ray not concerning for infection  -Painted with betadine, nursing to paint toes with betadine daily  -Per transplant service, recommend against surgical intervention due to inmunosupressed state.  Agree with transplant service.  Toes stable, no surgical intervention indicated at this time.  We will watch patient from a distance while inpatient.      DC instructions: patient to follow up with podiatry within one week of discharge.  Patient to have toes painted with betadine daily.    Will return tomorrow to discuss results.     Weightbearing Status: WBAT B/L  Offloading Device: DARCO shoe.

## 2018-04-20 NOTE — ASSESSMENT & PLAN NOTE
BG goal 140-180, bg at goal     continue transition gtt at 1.3 u/hr    Pt is T1DM, do not suspend insulin >1 hr   If TF held, decrease insulin rate to 0.2u/hr - known to have controlled BG on basal needs of lev 5 daily/ 0.2u/hr during this hospitalization        Discharge Recommendations:  TBD.

## 2018-04-20 NOTE — SUBJECTIVE & OBJECTIVE
Interval History: No complaints today. Slept well overnight and much more lucid today per patient and wife. SLED tonight. Psych at bedside.    Continuous Infusions:   insulin (HUMAN R) infusion (adults) 1.3 Units/hr (04/20/18 0905)     Scheduled Meds:   sodium chloride 0.9%   Intravenous Once    acetaminophen  999.0148 mg Per G Tube TID    cetirizine  5 mg Per G Tube Daily    chlorhexidine  15 mL Mouth/Throat BID    epoetin jose miguel (PROCRIT) injection  4,000 Units Intravenous Every Mon, Wed, Fri    escitalopram oxalate  10 mg Per G Tube Daily    famotidine  20 mg Per G Tube QHS    heparin (porcine)  5,000 Units Subcutaneous Q12H    levothyroxine  75 mcg Intravenous Daily    midodrine  2.5 mg Oral TID    nitroGLYCERIN 2% TD oint  0.5 inch Topical (Top) Q12H    polyethylene glycol  17 g Oral Daily    predniSONE  5 mg Per G Tube Daily    sennosides 8.8 mg/5 ml  5 mL Oral QHS    tacrolimus  1 mg Sublingual Daily    [START ON 4/21/2018] tacrolimus  2 mg Sublingual Daily     PRN Meds:sodium chloride 0.9%, sodium chloride 0.9%, heparin (porcine), insulin aspart U-100, midodrine, ondansetron, oxyCODONE, povidone-iodine    Review of patient's allergies indicates:   Allergen Reactions    No known drug allergies      Objective:     Vital Signs (Most Recent):  Temp: 97.9 °F (36.6 °C) (04/20/18 1148)  Pulse: 107 (04/20/18 1148)  Resp: 18 (04/20/18 1148)  BP: 112/61 (04/20/18 1148)  SpO2: 99 % (04/20/18 1148) Vital Signs (24h Range):  Temp:  [97.6 °F (36.4 °C)-98.4 °F (36.9 °C)] 97.9 °F (36.6 °C)  Pulse:  [] 107  Resp:  [16-18] 18  SpO2:  [96 %-100 %] 99 %  BP: (102-115)/(56-65) 112/61     Patient Vitals for the past 72 hrs (Last 3 readings):   Weight   04/20/18 0600 74.8 kg (164 lb 14.5 oz)     Body mass index is 25.83 kg/m².      Intake/Output Summary (Last 24 hours) at 04/20/18 1223  Last data filed at 04/20/18 0700   Gross per 24 hour   Intake           515.41 ml   Output               60 ml   Net            455.41 ml     Hemodynamic Parameters:     Physical Exam   Constitutional: He appears well-developed and well-nourished. No distress.   HENT:   Head: Normocephalic and atraumatic.   Eyes: Conjunctivae and EOM are normal.   Cardiovascular: Regular rhythm.  Tachycardia present.    Pulmonary/Chest: He has no wheezes. He has no rales.   Abdominal: Soft. He exhibits no distension.   Musculoskeletal: He exhibits edema. He exhibits no tenderness or deformity.   Neurological: He is alert.   Skin: Skin is warm and dry. He is not diaphoretic.     Significant Labs:  CBC:    Recent Labs  Lab 04/18/18  0534 04/19/18  0513 04/20/18  0358   WBC 3.21* 3.52* 3.30*   RBC 2.48* 2.57* 2.71*   HGB 7.8* 8.1* 8.4*   HCT 25.7* 25.2* 27.6*    189 224   * 98 102*   MCH 31.5* 31.5* 31.0   MCHC 30.4* 32.1 30.4*     BNP:  No results for input(s): BNP in the last 168 hours.    Invalid input(s): BNPTRIAGELBLO  CMP:    Recent Labs  Lab 04/18/18  0534 04/19/18  0512 04/20/18  0358   * 171* 140*   CALCIUM 9.9 9.2 9.8   ALBUMIN 2.4* 2.3* 2.1*   PROT 6.7 6.5 6.4   * 132* 134*   K 5.6* 4.5 5.6*   CO2 22* 23 23    100 99   BUN 46* 26* 41*   CREATININE 3.1* 2.1* 2.8*   ALKPHOS 230* 223* 208*   ALT 15 14 13   AST 16 19 16   BILITOT 0.5 0.5 0.4      Coagulation:   No results for input(s): PT, INR, APTT in the last 168 hours.  LDH:  No results for input(s): LDH in the last 72 hours.  Microbiology:  Microbiology Results (last 7 days)     Procedure Component Value Units Date/Time    Fungus culture [[id number]] Collected:  03/14/18 1137    Order Status:  Completed Specimen:  Bronchial Wash from Amniotic Fluid Updated:  04/18/18 1303     Fungus (Mycology) Culture No fungus isolated after 4 weeks          I have reviewed all pertinent labs within the past 24 hours.    Estimated Creatinine Clearance: 31.5 mL/min (A) (based on SCr of 2.8 mg/dL (H)).    Diagnostic Results:  I have reviewed and interpreted all pertinent imaging  results/findings within the past 24 hours.

## 2018-04-20 NOTE — ASSESSMENT & PLAN NOTE
- TTE 3/26/18 showed normal graft function but has known history of restrictive CM post transplant.  - Decrease pred , Tacro switched to SL as levels consistently low. Up today, adjust as needed for goal 6-8/10  - Cellcept remains on hold  - HD tonight

## 2018-04-20 NOTE — PROGRESS NOTES
[facility]  Physical Medicine & Rehab  Progress Note    Patient Name: Lv Crocker  MRN: [id number]  Admission Date: 3/11/2018  Length of Stay: 39 days  Attending Physician: Behzad Lara Jr.,*    Subjective:     Principal Problem:Acute respiratory failure with hypoxia    Hospital Course:   3/24/18:  Evaluated by PT and OT.  Bed mobility and transfers deferred 2/2 intubation and CRRT.   3/26/18:  Participated with PT.  Bed mobility totalA-dependent.  EOB totalA.  3/28/18:  Participated with PT and OT.  Bed mobility totalA-dependent.  ADLs totalA.   4/16/18:  Participated with PT and OT.  Bed mobility max-totalA/dependent.  Sit to stand totalA and transfers totalA.  EOB mod-totalA.  UBD totalA and LBD totalA.  4/17/18:  Participated with PT and SLP.  Bed mobility total-dependent.  EOB x 15 minutes mod-maxA.   4/18/18:  Participated with OT.  Bed mobility totalA.  EOB x 15 minutes totalA.    4/19/18:  Participated with PT, OT, and SLP.  Found to have cognitive-linguistic impairment and dysphonia.  Remains NPO.  Bed mobility total/dependent.  EOB x 24 minutes (static sit x ~10 seconds + ~5 seconds SV) mod-maxA.  No sit to stand, transfers, or gait.  ADLs totalA.     Interval History 4/20/2018:  Patient is seen for follow-up rehab evaluation and recommendations: No acute events over night.  Off trach collar, now tolerating RA.  Participated with therapy with therapy yesterday.  Psych consult pending.  Podiatry following.  Barriers for discharge/rehab admission: not medically ready for discharge     HPI, Past Medical, Family, and Social History remains the same as documented in the initial encounter.    Scheduled Medications:    acetaminophen  999.0148 mg Per G Tube TID    cetirizine  5 mg Per G Tube Daily    chlorhexidine  15 mL Mouth/Throat BID    epoetin jose miguel (PROCRIT) injection  4,000 Units Intravenous Every Mon, Wed, Fri    escitalopram oxalate  10 mg Per G Tube Daily     famotidine  20 mg Per G Tube QHS    heparin (porcine)  5,000 Units Subcutaneous Q12H    levothyroxine  75 mcg Intravenous Daily    midodrine  2.5 mg Oral TID    nitroGLYCERIN 2% TD oint  0.5 inch Topical (Top) Q12H    polyethylene glycol  17 g Oral Daily    predniSONE  5 mg Per G Tube Daily    sennosides 8.8 mg/5 ml  5 mL Oral QHS    tacrolimus  1 mg Sublingual Daily    [START ON 4/21/2018] tacrolimus  2 mg Sublingual Daily     PRN Medications: sodium chloride 0.9%, sodium chloride 0.9%, heparin (porcine), insulin aspart U-100, midodrine, ondansetron, oxyCODONE, povidone-iodine    Review of Systems   Constitutional: Positive for activity change. Negative for chills, fatigue and fever.   HENT: Negative for drooling, hearing loss, trouble swallowing and voice change.    Eyes: Negative for pain and visual disturbance.   Respiratory: Negative for cough and wheezing.         + trach   Cardiovascular: Negative for chest pain and palpitations.   Gastrointestinal: Negative for abdominal pain, nausea and vomiting.        + PEG   Genitourinary: Negative for difficulty urinating and flank pain.   Musculoskeletal: Positive for gait problem and myalgias. Negative for arthralgias, back pain and neck pain.   Skin: Positive for color change and wound. Negative for rash.   Allergic/Immunologic: Positive for immunocompromised state.   Neurological: Positive for weakness. Negative for dizziness, numbness and headaches.   Psychiatric/Behavioral: Positive for hallucinations and sleep disturbance. Negative for agitation. The patient is not nervous/anxious.      Objective:     Vital Signs (Most Recent):  Temp: 97.6 °F (36.4 °C) (04/20/18 0800)  Pulse: 105 (04/20/18 1129)  Resp: 18 (04/20/18 0800)  BP: (!) 115/56 (04/20/18 0800)  SpO2: 97 % (04/20/18 1100)    Vital Signs (24h Range):  Temp:  [97.6 °F (36.4 °C)-98.4 °F (36.9 °C)] 97.6 °F (36.4 °C)  Pulse:  [] 105  Resp:  [16-18] 18  SpO2:  [96 %-100 %] 97 %  BP:  (102-115)/(51-65) 115/56     Physical Exam   Constitutional: He is oriented to person, place, and time. He appears well-developed. He appears ill. No distress.   HENT:   Head: Normocephalic and atraumatic.   Right Ear: External ear normal.   Left Ear: External ear normal.   Nose: Nose normal.   Eyes: Right eye exhibits no discharge. Left eye exhibits no discharge. No scleral icterus.   Neck: Neck supple.   Trach intact.    Cardiovascular: Normal rate, regular rhythm and intact distal pulses.    Pulmonary/Chest: Effort normal. No respiratory distress. He has no wheezes.   Abdominal: Soft. He exhibits no distension. There is no tenderness.   PEG intact, incision LETICIA, CDI   Musculoskeletal: He exhibits no edema or tenderness.        Left hand: He exhibits decreased range of motion and swelling.   R foot: R great toe and R 3rd toe with necrotic tissue  L foot: small amount of necrotic tissue to L great toe  Overall deconditioning   Neurological: He is alert and oriented to person, place, and time.   -  Mental Status:  AAOx3.  Follows commands.  Answers correct age and .  Recent and remote memory intact.  -  Speech and language:  no aphasia or dysarthria.    -  Motor:  RUE: 2/5, 3/5 .  LUE: 2+/5, 3/5 .  RLE: 2/5, DF 3+/5, PF 3+/5.  LLE: 2/5, DF 2/5, PF 3+/5.  -  Sensory:  Intact to light touch and pin prick.   Skin: Skin is warm and dry. No rash noted.   Psychiatric: His behavior is normal. Thought content normal. His affect is blunt.   Vitals reviewed.    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 Ischemic changes to right second toe and left hallux  -  Wound care  "following  -  Podiatry following         Severe malnutrition    -  S/p PEG placement on 4/4/18  -  On continuous TF        Type 1 diabetes mellitus with stage 3 chronic kidney disease    -  Endocrine following  -  On insulin gtt        Acute renal failure with acute tubular necrosis superimposed on stage 3 chronic kidney disease    -  Nephrology following "anuric CACHORRO; likely ischemic ATN 2/2 prolonged pre-renal state (diarrhea) in setting of prograf renal vasoconstriction; cannot r/o septic ATN or Prograf toxicity"  -  On HD  -  S/p perm-a-cath placement on 4/4/18        Heart transplanted    -  S/p OHTX 11/18/2014 with early post-op course complicated by hemorrhagic tamponade s/p  washout, delayed closure, pericardial window with subsequent a-fib with RVR and CACHORRO and late course complicated by ABMR (in 4/2015 s/p rituxan, PLEX, and IVIG), C.diff/CMV reactivation, CKD, and restrictive cardiomyopathy with subsequent chronic/recurrent pleural effusions and ascites previously requiring monthly paracentesis and thoracentesis until he underwent VATS with pleurX catheter placement on 1/11/2018 with removal on 2/7/2018        Debility    -  2/2 prolonged and acute hospital course  -  (I) ADLs and mobility prior to admission, limited by fatigue/endurance  Recommendations  -  Encourage mobility, OOB in chair, and early ambulation as appropriate  -  PT/OT evaluate and treat  -  Pain management  -  Monitor for and prevent skin breakdown and pressure ulcers  · Early mobility, repositioning/weight shifting every 20-30 minutes when sitting, turn patient every 2 hours, proper mattress/overlay and chair cushioning, pressure relief/heel protector boots  -  DVT prophylaxis:  MARK, SCD        Anxiety    -  Anxiety and sleeping difficulty   -  Psych consult pending         Patient with rehab goals; however, he is severely debilitated and is unlikely to tolerate inpatient rehab program at this time.  Now tolerating RA.  Podiatry " following for gangrene.  Psych consult pending.  Will continue to follow progress and discuss with rehab team for final rehab recommendation.    RENETTA Caldwell  Department of Physical Medicine & Rehab   [facility]

## 2018-04-20 NOTE — PT/OT/SLP PROGRESS
Occupational Therapy      Patient Name:  Lv Crocker   MRN:  [id number]  [id number], [id number]  Patient not seen today secondary to with multiple medical teams in a.m, and on p.m attempt pt off of unit at HD. Will follow-up as schedule allows.    Delia To OT  4/20/2018

## 2018-04-21 PROBLEM — F41.9 ANXIETY DISORDER: Status: ACTIVE | Noted: 2018-01-01

## 2018-04-21 PROBLEM — F51.04 CHRONIC INSOMNIA: Status: ACTIVE | Noted: 2018-01-01

## 2018-04-21 PROBLEM — F32.A DEPRESSION: Status: ACTIVE | Noted: 2018-01-01

## 2018-04-21 NOTE — ASSESSMENT & PLAN NOTE
- Appreciate Nephrology recs.   - Continue middorine for pressure support  - HD per nephrology.   - Had HD yesterday with removal of 0.5L fluid. No complication noted

## 2018-04-21 NOTE — ASSESSMENT & PLAN NOTE
BG goal 140-180, bg at goal     Increase transition to 0.8u/hr, will likely need higher rate when TF resumed    Pt is T1DM, do not suspend insulin >1 hr   If TF held, decrease insulin rate to 0.2u/hr - known to have controlled BG on basal needs of lev 5 daily/ 0.2u/hr during this hospitalization        Discharge Recommendations:  TBD.

## 2018-04-21 NOTE — PROGRESS NOTES
Pt O2 sat 87% on RA. Resp was in the pts room and placed him back on the trach collar to maintain o2 sats > 92%. WCTM.

## 2018-04-21 NOTE — ASSESSMENT & PLAN NOTE
- TTE 3/26/18 showed normal graft function but has known history of restrictive CM post transplant.  - Decrease pred , Tacro switched to SL as levels consistently low.  goal 6-8/10  - Tacro- level 11 after adjustment yesterday. No change in dosing of 2/1 daily  - Cellcept remains on hold

## 2018-04-21 NOTE — PLAN OF CARE
Problem: Diabetes, Type 1 (Adult)  Goal: Signs and Symptoms of Listed Potential Problems Will be Absent, Minimized or Managed (Diabetes, Type 1)  Signs and symptoms of listed potential problems will be absent, minimized or managed by discharge/transition of care (reference Diabetes, Type 1 (Adult) CPG).   Outcome: Ongoing (interventions implemented as appropriate)  -Insulin gtt @ 0.5u/hr. 0400 .   -CBG checks q4h.     Problem: Fall Risk (Adult)  Goal: Identify Related Risk Factors and Signs and Symptoms  Related risk factors and signs and symptoms are identified upon initiation of Human Response Clinical Practice Guideline (CPG)   Outcome: Ongoing (interventions implemented as appropriate)  - 2-3 person assist. Wife @ bedside attentive to pt needs.   -Pt free from falls/injuries thus far this shift.   -Bed in low, locked position. Bed rails up x3. Call light within reach.     Problem: Patient Care Overview  Goal: Plan of Care Review  Outcome: Ongoing (interventions implemented as appropriate)  -AAOx4  -Trach # 6 shiley on trach collar 2/2 o2 sat 87%  -Deep tissue injuries to buttocks and bilateral heels. Pt refused to turn q2h to prevent further injury.   -Gangrenous toes on the right foot pt refused nitro paste and betadine.   -G-tube with TF that are currently on hold 2/2 high residuals and pt c/o nausea controlled with SL zofran.   -HD yesterday with 0.5L removed. Will plan for CRRT tonight.   -Tele ST  -No other complaints overnight.   -Will continue to monitor.

## 2018-04-21 NOTE — NURSING
Report to Dr. Simons-HTS pt had 150ml residual this AM and 100ml at 1130. Pt c/o abd discomfort. Dr. Simons ordered to start tube feeds at 10ml/hr and check hourly until meet goal of 40ml/hr.

## 2018-04-21 NOTE — SUBJECTIVE & OBJECTIVE
Interval History:     Had nausea episode secondary to increased tube feed residual. TF were held and insulin adjusted. Psych advised to  Increase lexapro and add Seroquel for insomnia and agitation. Tacro-level above goal today while leukopenia is stable. Had HD last night with 0.5Liters removal.  Continuous Infusions:   insulin (HUMAN R) infusion (adults) 0.5 Units/hr (04/21/18 0041)     Scheduled Meds:   sodium chloride 0.9%   Intravenous Once    acetaminophen  999.0148 mg Per G Tube TID    cetirizine  5 mg Per G Tube Daily    chlorhexidine  15 mL Mouth/Throat BID    epoetin jose miguel (PROCRIT) injection  4,000 Units Intravenous Every Mon, Wed, Fri    escitalopram oxalate  20 mg Per G Tube Daily    famotidine  20 mg Per G Tube QHS    heparin (porcine)  5,000 Units Subcutaneous Q12H    levothyroxine  75 mcg Intravenous Daily    midodrine  2.5 mg Oral TID    nitroGLYCERIN 2% TD oint  0.5 inch Topical (Top) Q12H    polyethylene glycol  17 g Oral Daily    predniSONE  5 mg Per G Tube Daily    QUEtiapine  50 mg Oral QHS    sennosides 8.8 mg/5 ml  5 mL Oral QHS    tacrolimus  1 mg Sublingual Daily    tacrolimus  2 mg Sublingual Daily     PRN Meds:sodium chloride 0.9%, sodium chloride 0.9%, heparin (porcine), insulin aspart U-100, midodrine, ondansetron, oxyCODONE, povidone-iodine, ramelteon    Review of patient's allergies indicates:   Allergen Reactions    No known drug allergies      Objective:     Vital Signs (Most Recent):  Temp: 98.8 °F (37.1 °C) (04/21/18 0757)  Pulse: 108 (04/21/18 0803)  Resp: 18 (04/21/18 0757)  BP: 99/62 (04/21/18 0757)  SpO2: (!) 94 % (04/21/18 0900) Vital Signs (24h Range):  Temp:  [97.5 °F (36.4 °C)-98.8 °F (37.1 °C)] 98.8 °F (37.1 °C)  Pulse:  [105-111] 108  Resp:  [15-18] 18  SpO2:  [90 %-100 %] 94 %  BP: ()/(43-62) 99/62     Patient Vitals for the past 72 hrs (Last 3 readings):   Weight   04/20/18 1100 74.8 kg (164 lb 14.5 oz)   04/20/18 0600 74.8 kg (164 lb 14.5 oz)      Body mass index is 25.83 kg/m².      Intake/Output Summary (Last 24 hours) at 04/21/18 0951  Last data filed at 04/21/18 0800   Gross per 24 hour   Intake           635.34 ml   Output             1310 ml   Net          -674.66 ml       Hemodynamic Parameters:       Telemetry:    Physical Exam   Constitutional: He is oriented to person, place, and time.   Generalized weakness but normal ROM   Neck: No JVD present.   Trach in place.   Cardiovascular: Normal rate, regular rhythm and normal heart sounds.    Pulmonary/Chest: Effort normal and breath sounds normal.   Abdominal: Soft. Bowel sounds are normal.   Musculoskeletal: Normal range of motion. He exhibits no edema or tenderness.   Neurological: He is alert and oriented to person, place, and time.   Skin: Skin is warm and dry.   Psychiatric:   Unable to assess mood this morning. Patient seen while waking up   Nursing note and vitals reviewed.          Significant Labs:  CBC:    Recent Labs  Lab 04/19/18  0513 04/20/18  0358 04/21/18  0334   WBC 3.52* 3.30* 3.46*   RBC 2.57* 2.71* 2.66*   HGB 8.1* 8.4* 8.3*   HCT 25.2* 27.6* 27.2*    224 215   MCV 98 102* 102*   MCH 31.5* 31.0 31.2*   MCHC 32.1 30.4* 30.5*     BNP:  No results for input(s): BNP in the last 168 hours.    Invalid input(s): BNPTRIAGELBLO  CMP:    Recent Labs  Lab 04/19/18  0512 04/20/18  0358 04/21/18  0334   * 140* 216*   CALCIUM 9.2 9.8 9.2   ALBUMIN 2.3* 2.1* 2.1*   PROT 6.5 6.4 6.2   * 134* 133*   K 4.5 5.6* 3.8   CO2 23 23 22*    99 101   BUN 26* 41* 25*   CREATININE 2.1* 2.8* 1.8*   ALKPHOS 223* 208* 209*   ALT 14 13 11   AST 19 16 13   BILITOT 0.5 0.4 0.5      Coagulation:   No results for input(s): PT, INR, APTT in the last 168 hours.  LDH:  No results for input(s): LDH in the last 72 hours.  Microbiology:  Microbiology Results (last 7 days)     Procedure Component Value Units Date/Time    Fungus culture [[id number]] Collected:  03/14/18 1137    Order Status:   Completed Specimen:  Bronchial Wash from Amniotic Fluid Updated:  04/18/18 4720     Fungus (Mycology) Culture No fungus isolated after 4 weeks          I have reviewed all pertinent labs within the past 24 hours.    Estimated Creatinine Clearance: 49 mL/min (A) (based on SCr of 1.8 mg/dL (H)).    Diagnostic Results:

## 2018-04-21 NOTE — ASSESSMENT & PLAN NOTE
- PT/OT/SLP daily.   - Tube feeds held last night due to residual >200. Restarted at mid-day today at 10cc/hrs with plan to advance 10cc/hr  - Nutrition following.   - switched from glucerna to novasource renal (start at 10 and increase to goal of 40 as tolerated)

## 2018-04-21 NOTE — ASSESSMENT & PLAN NOTE
- Continue PTA  Escitalopram, will stop xanex and nortriptyline due to hallucinations and give sleep med and less pain meds  - Increased Lexapro to 20mg daily per psych recs  - started Seroquel as per psych rec. Interaction with other agents cross check before starting

## 2018-04-21 NOTE — PROGRESS NOTES
Patient received HD yesterday; 500cc removed.  Vital signs and labs reviewed. K < 4 today.  Will hold RRT today/tonight.  Will reassess in AM.     Amairani Alegria, PGY-5  Nephrology Fellow  [facility]  Pager: [phone]

## 2018-04-21 NOTE — NURSING
Reported to Dr. Westbrook pt c/o of nausea with tube feeding at a rate of 40ml/hr. Residual was 75ml and returned. Tube feed was paused and nausea improved. Pt refused anti nausea medications. Will restart tube feedings at 40ml/hr per verbal order.     Also reported to Dr. Westbrook pt's BP was 114/57 and this RN held Midodrine. Will continue to monitor.

## 2018-04-21 NOTE — SUBJECTIVE & OBJECTIVE
"Interval HPI:   Overnight events: tf held last night for high residuals  Eating:   NPO  Nausea: No  Hypoglycemia and intervention: No  Fever: No  TPN and/or TF: No  If yes, type of TF/TPN and rate: n/a    BP 99/62 (BP Location: Left arm, Patient Position: Lying)   Pulse 109   Temp 98.8 °F (37.1 °C) (Oral)   Resp 18   Ht 5' 7" (1.702 m)   Wt 74.8 kg (164 lb 14.5 oz)   SpO2 (!) 94%   BMI 25.83 kg/m²     Labs Reviewed and Include      Recent Labs  Lab 04/21/18  0334   *   CALCIUM 9.2   ALBUMIN 2.1*   PROT 6.2   *   K 3.8   CO2 22*      BUN 25*   CREATININE 1.8*   ALKPHOS 209*   ALT 11   AST 13   BILITOT 0.5     Lab Results   Component Value Date    WBC 3.46 (L) 04/21/2018    HGB 8.3 (L) 04/21/2018    HCT 27.2 (L) 04/21/2018     (H) 04/21/2018     04/21/2018       Recent Labs  Lab 04/15/18  0743 04/21/18  0334   TSH 12.752* 7.085*   FREET4 0.94 0.87     Lab Results   Component Value Date    HGBA1C 5.1 04/05/2018       Nutritional status:   Body mass index is 25.83 kg/m².  Lab Results   Component Value Date    ALBUMIN 2.1 (L) 04/21/2018    ALBUMIN 2.1 (L) 04/20/2018    ALBUMIN 2.3 (L) 04/19/2018     Lab Results   Component Value Date    PREALBUMIN 13 (L) 04/08/2018    PREALBUMIN 5 (L) 03/15/2018    PREALBUMIN 18 (L) 03/24/2015       Estimated Creatinine Clearance: 49 mL/min (A) (based on SCr of 1.8 mg/dL (H)).    Accu-Checks  Recent Labs      04/19/18 2014 04/20/18   0004  04/20/18   0425  04/20/18   0753  04/20/18   1231  04/20/18   1625  04/20/18   2022  04/20/18   2347  04/21/18   0418  04/21/18   0755   POCTGLUCOSE  204*  178*  162*  137*  145*  159*  167*  223*  205*  202*       Current Medications and/or Treatments Impacting Glycemic Control  Immunotherapy:  Immunosuppressants         Stop Route Frequency     tacrolimus capsule 2 mg      -- SL Daily     tacrolimus capsule 1 mg      -- SL Daily        Steroids:   Hormones     Start     Stop Route Frequency Ordered    " 04/19/18 0900  predniSONE tablet 5 mg      -- PER G TUBE Daily 04/18/18 1106        Pressors:    Autonomic Drugs     Start     Stop Route Frequency Ordered    04/13/18 0859  midodrine tablet 10 mg      -- Oral As needed (PRN) 04/13/18 0800    04/09/18 1500  midodrine tablet 2.5 mg      -- Oral 3 times daily 04/09/18 1006        Hyperglycemia/Diabetes Medications: Antihyperglycemics     Start     Stop Route Frequency Ordered    04/11/18 0015  insulin regular (Humulin R) 100 Units in sodium chloride 0.9% 100 mL infusion     Question:  Insulin Rate Adjustment (DO NOT MODIFY ANSWER)  Answer:  [email or web address]    -- IV Continuous 04/10/18 2313    04/10/18 2221  insulin aspart U-100 pen 0-4 Units      -- SubQ Every 4 hours PRN 04/10/18 2122

## 2018-04-21 NOTE — PROGRESS NOTES
[facility]  Heart Transplant  Progress Note    Patient Name: Lv Crocker  MRN: [id number]  Admission Date: 3/11/2018  Hospital Length of Stay: 40 days  Attending Physician: Behzad Lara Jr.,*  Primary Care Provider: Philippe Mohr MD  Principal Problem:Acute renal failure with acute tubular necrosis superimposed on stage 3 chronic kidney disease    Subjective:     Interval History:     Had nausea episode secondary to increased tube feed residual. TF were held and insulin adjusted. Psych advised to  Increase lexapro and add Seroquel for insomnia and agitation. Tacro-level above goal today while leukopenia is stable. Had HD last night with 0.5Liters removal.  Continuous Infusions:   insulin (HUMAN R) infusion (adults) 0.5 Units/hr (04/21/18 0041)     Scheduled Meds:   sodium chloride 0.9%   Intravenous Once    acetaminophen  999.0148 mg Per G Tube TID    cetirizine  5 mg Per G Tube Daily    chlorhexidine  15 mL Mouth/Throat BID    epoetin jose miguel (PROCRIT) injection  4,000 Units Intravenous Every Mon, Wed, Fri    escitalopram oxalate  20 mg Per G Tube Daily    famotidine  20 mg Per G Tube QHS    heparin (porcine)  5,000 Units Subcutaneous Q12H    levothyroxine  75 mcg Intravenous Daily    midodrine  2.5 mg Oral TID    nitroGLYCERIN 2% TD oint  0.5 inch Topical (Top) Q12H    polyethylene glycol  17 g Oral Daily    predniSONE  5 mg Per G Tube Daily    QUEtiapine  50 mg Oral QHS    sennosides 8.8 mg/5 ml  5 mL Oral QHS    tacrolimus  1 mg Sublingual Daily    tacrolimus  2 mg Sublingual Daily     PRN Meds:sodium chloride 0.9%, sodium chloride 0.9%, heparin (porcine), insulin aspart U-100, midodrine, ondansetron, oxyCODONE, povidone-iodine, ramelteon    Review of patient's allergies indicates:   Allergen Reactions    No known drug allergies      Objective:     Vital Signs (Most Recent):  Temp: 98.8 °F (37.1 °C) (04/21/18 0757)  Pulse: 108 (04/21/18 0803)  Resp: 18  (04/21/18 0757)  BP: 99/62 (04/21/18 0757)  SpO2: (!) 94 % (04/21/18 0900) Vital Signs (24h Range):  Temp:  [97.5 °F (36.4 °C)-98.8 °F (37.1 °C)] 98.8 °F (37.1 °C)  Pulse:  [105-111] 108  Resp:  [15-18] 18  SpO2:  [90 %-100 %] 94 %  BP: ()/(43-62) 99/62     Patient Vitals for the past 72 hrs (Last 3 readings):   Weight   04/20/18 1100 74.8 kg (164 lb 14.5 oz)   04/20/18 0600 74.8 kg (164 lb 14.5 oz)     Body mass index is 25.83 kg/m².      Intake/Output Summary (Last 24 hours) at 04/21/18 0951  Last data filed at 04/21/18 0800   Gross per 24 hour   Intake           635.34 ml   Output             1310 ml   Net          -674.66 ml       Hemodynamic Parameters:       Telemetry:    Physical Exam   Constitutional: He is oriented to person, place, and time.   Generalized weakness but normal ROM   Neck: No JVD present.   Trach in place.   Cardiovascular: Normal rate, regular rhythm and normal heart sounds.    Pulmonary/Chest: Effort normal and breath sounds normal.   Abdominal: Soft. Bowel sounds are normal.   Musculoskeletal: Normal range of motion. He exhibits no edema or tenderness.   Neurological: He is alert and oriented to person, place, and time.   Skin: Skin is warm and dry.   Psychiatric:   Unable to assess mood this morning. Patient seen while waking up   Nursing note and vitals reviewed.          Significant Labs:  CBC:    Recent Labs  Lab 04/19/18  0513 04/20/18  0358 04/21/18  0334   WBC 3.52* 3.30* 3.46*   RBC 2.57* 2.71* 2.66*   HGB 8.1* 8.4* 8.3*   HCT 25.2* 27.6* 27.2*    224 215   MCV 98 102* 102*   MCH 31.5* 31.0 31.2*   MCHC 32.1 30.4* 30.5*     BNP:  No results for input(s): BNP in the last 168 hours.    Invalid input(s): MICHAEL  CMP:    Recent Labs  Lab 04/19/18  0512 04/20/18  0358 04/21/18  0334   * 140* 216*   CALCIUM 9.2 9.8 9.2   ALBUMIN 2.3* 2.1* 2.1*   PROT 6.5 6.4 6.2   * 134* 133*   K 4.5 5.6* 3.8   CO2 23 23 22*    99 101   BUN 26* 41* 25*   CREATININE  2.1* 2.8* 1.8*   ALKPHOS 223* 208* 209*   ALT 14 13 11   AST 19 16 13   BILITOT 0.5 0.4 0.5      Coagulation:   No results for input(s): PT, INR, APTT in the last 168 hours.  LDH:  No results for input(s): LDH in the last 72 hours.  Microbiology:  Microbiology Results (last 7 days)     Procedure Component Value Units Date/Time    Fungus culture [[id number]] Collected:  03/14/18 1137    Order Status:  Completed Specimen:  Bronchial Wash from Amniotic Fluid Updated:  04/18/18 1303     Fungus (Mycology) Culture No fungus isolated after 4 weeks          I have reviewed all pertinent labs within the past 24 hours.    Estimated Creatinine Clearance: 49 mL/min (A) (based on SCr of 1.8 mg/dL (H)).    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 Most recently has had ongoing issues with his lungs, had gotten 2 thoracenteses, after which he underwent VATS 01/19/18 followed by pleurex catheter placement and effusion drainage 01/11/18, subsequently had it removed 02/07/18 after drainage had decreased despite multiple attempts to drain it. Has been having significant coughing fits that result in him being near-syncopal at times, although difficult to say if he has passed out or not- patient most recently was admitted to the hospital for increased AGUILAR, coughing and pre-syncope 02/11-02/14/18 at [facility].   Patient was started on antibiotics for suspected bacterial infection, with some diarrhea, bronchitis, and possible pneumonia. Ct chest showed some pleural fluid collection and after talking with Dr. James, we arranged for him to receive a diagnostic tap with IR, from which the fluid collection demonstrated no growth or significant findings at all really. Cell counts do not appear to have been sent but will  recheck. Patient states since his hospital stay, yesterday had a significant coughing fit again, after which he nearly passed out, is being seen in clinic today for this. Denies any cardiopulmonary complaints, no leg swelling, has some abdominal swelling, which is moderate for him. Denies significant shortness of breath with mild exertion, had 6MWT yesterday to see if he qualified for home O2, and his O2 sats actually improved after recovery from where he started initially. He and his wife admit he is in bed most days, not very active.     Mr. Crocker presented to the ED 3/11/18 with approximately 3 weeks of worsening diarrhea and 2-3 days of sinus pressure, drainage, and worsened cough.  He notes that he had a coughing fit last night and passed out, coughed throughout most of the night.  This also happened on 2/25/18.  He last saw Dr Ferreira in clinic on 2/20/18 where he was taken off myfortic and switched to cellcept (he hadn't been on cellcept because of leukopenia when they tried post-transplant).  Though his diarrhea had improved after his last discharge, he states it has increased now to 15x/day, clear/yellow/green, no fevers, no exacerbating foods per say.  He was taken back off the cellcept and placed back on myfortic this past week and has not noticed immediate change in diarrhea. He also reports his baseline coughing fits havent improved and are minimally responsive to tessalon pearls.  Came on last night, he lost consciousness during a fit once which is relatively normal for him, and had two more during HPI.  He notes no sick contacts but does state he's had some URI symptoms as above.  His baseline vitals are usually -120 and BP 90s/60s-110s/70s.  He reports a history of intermittent diarrhea - did have Cdiff many years ago but this smells different.  No abdominal pain, no nausea, no vomiting.  No blood in diarrhea.  Appears last c-scope in 2015 with no evidence of infection or inflammatory  processes.  He does report IBS which is different that this.       * Acute renal failure with acute tubular necrosis superimposed on stage 3 chronic kidney disease    - Appreciate Nephrology recs.   - Continue middorine for pressure support  - HD per nephrology.   - Had HD yesterday with removal of 0.5L fluid. No complication noted        Heart transplanted    - TTE 3/26/18 showed normal graft function but has known history of restrictive CM post transplant.  - Decrease pred , Tacro switched to SL as levels consistently low.  goal 6-8/10  - Tacro- level 11 after adjustment yesterday. No change in dosing of 2/1 daily  - Cellcept remains on hold          Alteration in skin integrity    - wound care following        Acute respiratory failure with hypoxia    - Resolved.   - S/P Bronch and intubation 3/14 now post tracheostomy due to inability to extubate  - Pulmonology signed off. Completely weaned off vent.   - RSV positive early and completed course of Ribavarin/IVIG.  - ID had been following. Completed 7 day course of Meropenem/Linezolid earlier in hospitalization  - Appreciate PT/OT/Wound care.        Restrictive cardiomyopathy    - No changes from before.   - Has known history of recurrent ascites and pleural effusions   - S/P thoracentesis X 2, followed by VATS/pleurex cath placement (January 2018) with removal of pleurex (February 2018) and 3rd thoracentesis 2/14/18 with no significant findings on fluid removal.        Type 1 diabetes mellitus with stage 3 chronic kidney disease    - Appreciate Endocrine's recs  - Insulin gtt per endocrine recs        Hypothyroidism due to Hashimoto's thyroiditis    - Appreciate Endocrine's recs  - Continue Levothyroxine 150mcg per NG tube (adjusted over the weekend)        Anemia    - Transfused 2 units of PRBCs on 4/11 during HD  - Nephrology has resumed ProCrit         Debility    - PT/OT/SLP daily.   - Tube feeds held last night due to residual >200. Restarted at mid-day  today at 10cc/hrs with plan to advance 10cc/hr  - Nutrition following.   - switched from glucerna to novasource renal (start at 10 and increase to goal of 40 as tolerated)        Anxiety    - Continue PTA  Escitalopram, will stop xanex and nortriptyline due to hallucinations and give sleep med and less pain meds  - Increased Lexapro to 20mg daily per psych recs  - started Seroquel as per psych rec. Interaction with other agents cross check before starting          Discussed care with Dr. Marco Walsh MD  Heart Transplant  [facility]

## 2018-04-21 NOTE — PROGRESS NOTES
RN called into pts room and pt stated he felt nauseated. Residuals checked and was 200. HTS Dr. Wong notified and ordered to stop TF. At this time Dr. Keith with endocrine was contacted about insulin gtt and stated to drop gtt to 0.5u/hr and recheck at 0400. IF recheck is < 100 drop the gtt to 0.2u/hr if < 70 stop gtt. WCTM.

## 2018-04-21 NOTE — PROGRESS NOTES
"[facility]  Endocrinology  Progress Note    Admit Date: 3/11/2018     Reason for Consult: abnormal TFTs    Surgical Procedure and Date: s/p heart transplant 2014     Diabetes diagnosis year: 7yo (T1DM)     Home Diabetes Medications:    Levemir 15u qHS  Novolog 4u AC     How often checking glucose at home? 4 times daily   BG readings on regimen: 150-200s  Hypoglycemia on the regimen?  Yes, rarely - treats appropriately (light snack, glucose tab)  Missed doses on regimen?  No        Diabetes Complications include:     Hyperglycemia, Hypoglycemia  and Diabetic chronic kidney disease          Complicating diabetes co morbidities:   N/A     HPI:   Patient is a 42 y.o. male with a diagnosis of T1DM, HTN, hypothyroidism, s/p heart transplant.  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 He required serial paracentesis and thoracentesis until he underwent a VATS with pleurX catheter placement on 1/11/2018 and removed 2/7/18.       Presented to hospital secondary to diarrhea and cough.  Upon initial labs, TSH was decreased (0.101) and free T4 1.33.  Endocrinology consulted to assist in management of these labs.  He was last seen in clinic by Kamala Vázquez NP 11/2017.   Previous hospitalization, endocrine consulted for same problem.  During that visit, recommended decreasing LT4 to 125mcg daily. Unfortunately, patient was discharged on previous dose of 150mcg daily.     Interval HPI:   Overnight events: tf held last night for high residuals  Eating:   NPO  Nausea: No  Hypoglycemia and intervention: No  Fever: No  TPN and/or TF: No  If yes, type of TF/TPN and rate: n/a    BP 99/62 (BP Location: Left arm, Patient Position: Lying)   Pulse 109   Temp 98.8 °F (37.1 °C) (Oral)   Resp 18   Ht 5' 7" (1.702 m)   Wt 74.8 kg (164 lb 14.5 oz)   SpO2 (!) 94%   BMI 25.83 kg/m²       Labs Reviewed and Include "      Recent Labs  Lab 04/21/18  0334   *   CALCIUM 9.2   ALBUMIN 2.1*   PROT 6.2   *   K 3.8   CO2 22*      BUN 25*   CREATININE 1.8*   ALKPHOS 209*   ALT 11   AST 13   BILITOT 0.5     Lab Results   Component Value Date    WBC 3.46 (L) 04/21/2018    HGB 8.3 (L) 04/21/2018    HCT 27.2 (L) 04/21/2018     (H) 04/21/2018     04/21/2018       Recent Labs  Lab 04/15/18  0743 04/21/18  0334   TSH 12.752* 7.085*   FREET4 0.94 0.87     Lab Results   Component Value Date    HGBA1C 5.1 04/05/2018       Nutritional status:   Body mass index is 25.83 kg/m².  Lab Results   Component Value Date    ALBUMIN 2.1 (L) 04/21/2018    ALBUMIN 2.1 (L) 04/20/2018    ALBUMIN 2.3 (L) 04/19/2018     Lab Results   Component Value Date    PREALBUMIN 13 (L) 04/08/2018    PREALBUMIN 5 (L) 03/15/2018    PREALBUMIN 18 (L) 03/24/2015       Estimated Creatinine Clearance: 49 mL/min (A) (based on SCr of 1.8 mg/dL (H)).    Accu-Checks  Recent Labs      04/19/18   2014  04/20/18   0004  04/20/18   0425  04/20/18   0753  04/20/18   1231  04/20/18   1625  04/20/18   2022  04/20/18   2347  04/21/18   0418  04/21/18   0755   POCTGLUCOSE  204*  178*  162*  137*  145*  159*  167*  223*  205*  202*       Current Medications and/or Treatments Impacting Glycemic Control  Immunotherapy:  Immunosuppressants         Stop Route Frequency     tacrolimus capsule 2 mg      -- SL Daily     tacrolimus capsule 1 mg      -- SL Daily        Steroids:   Hormones     Start     Stop Route Frequency Ordered    04/19/18 0900  predniSONE tablet 5 mg      -- PER G TUBE Daily 04/18/18 1106        Pressors:    Autonomic Drugs     Start     Stop Route Frequency Ordered    04/13/18 0859  midodrine tablet 10 mg      -- Oral As needed (PRN) 04/13/18 0800    04/09/18 1500  midodrine tablet 2.5 mg      -- Oral 3 times daily 04/09/18 1006        Hyperglycemia/Diabetes Medications: Antihyperglycemics     Start     Stop Route Frequency Ordered    04/11/18 0014   insulin regular (Humulin R) 100 Units in sodium chloride 0.9% 100 mL infusion     Question:  Insulin Rate Adjustment (DO NOT MODIFY ANSWER)  Answer:  [email or web address]    -- IV Continuous 04/10/18 2313    04/10/18 2221  insulin aspart U-100 pen 0-4 Units      -- SubQ Every 4 hours PRN 04/10/18 2122          ASSESSMENT and PLAN    Type 1 diabetes mellitus with stage 3 chronic kidney disease    BG goal 140-180, bg at goal     Increase transition to 0.8u/hr, will likely need higher rate when TF resumed    Pt is T1DM, do not suspend insulin >1 hr   If TF held, decrease insulin rate to 0.2u/hr - known to have controlled BG on basal needs of lev 5 daily/ 0.2u/hr during this hospitalization        Discharge Recommendations:  TBD.        Hypothyroidism due to Hashimoto's thyroiditis    continue iv levothyroxine 75mcg daily  tsh improving          On enteral nutrition    TF at goal.  May increase insulin needs        Current chronic use of systemic steroids    Can increase insulin requirement        CKD (chronic kidney disease), stage IV    Titrate cautiously.  Caution with insulin stacking.  Avoid hypoglycemia.        Acute respiratory failure with hypoxia    Per primary  S/p trach.  Practicing with speech valve.  Remains NPO        Heart transplanted    Per transplant  Avoid hypoglycemia  On PDN and prograf - could lead to prandial elevations and insulin resistance.            Ankur Keith MD  Endocrinology  [facility]

## 2018-04-22 PROBLEM — K31.84 GASTROPARESIS: Status: ACTIVE | Noted: 2018-01-01

## 2018-04-22 NOTE — PLAN OF CARE
Problem: Diabetes, Type 1 (Adult)  Goal: Signs and Symptoms of Listed Potential Problems Will be Absent, Minimized or Managed (Diabetes, Type 1)  Signs and symptoms of listed potential problems will be absent, minimized or managed by discharge/transition of care (reference Diabetes, Type 1 (Adult) CPG).   Outcome: Ongoing (interventions implemented as appropriate)  -Insulin gtt @ 0.7u/hr. 0000 . SSI admin per order.   -CBG checks q4h.     Problem: Fall Risk (Adult)  Goal: Identify Related Risk Factors and Signs and Symptoms  Related risk factors and signs and symptoms are identified upon initiation of Human Response Clinical Practice Guideline (CPG)   Outcome: Ongoing (interventions implemented as appropriate)  - 2-3 person assist. Wife @ bedside attentive to pt needs.   -Pt free from falls/injuries thus far this shift.   -Bed in low, locked position. Bed rails up x3. Call light within reach.    Problem: Patient Care Overview  Goal: Plan of Care Review  Outcome: Ongoing (interventions implemented as appropriate)  -AAOx4  -Trach # 6 shiley on RA O2 sats > 92%.   -Deep tissue injuries to buttocks and bilateral heels. Pt refused to turn q2h to prevent further injury.   -Gangrenous toes on the right foot pt refused nitro paste and betadine.   -G-tube with TF that are currently on hold 2/2 high residuals and pt c/o nausea controlled with SL zofran.   -Plan for CRRT Monday.   -Tele ST  -No other complaints overnight.   -Will continue to monitor

## 2018-04-22 NOTE — ASSESSMENT & PLAN NOTE
BG goal 140-180, bg at goal     Cont transition to 0.7u/hr, will likely need higher rate when TF resumed    Pt is T1DM, do not suspend insulin >1 hr   If TF held, decrease insulin rate to 0.2u/hr - known to have controlled BG on basal needs of lev 5 daily/ 0.2u/hr during this hospitalization        Discharge Recommendations:  TBD.

## 2018-04-22 NOTE — PLAN OF CARE
Problem: Patient Care Overview  Goal: Plan of Care Review  Outcome: Ongoing (interventions implemented as appropriate)  Pt is AAOx4, total assist, and VSS-hypotension managed with Midodrine. Tube feedings restarted and at goal of 40ml/hr. Residual checked frequently during shift. Pt's weight shifted during shift. Insulin infusing at 0.7 units/hr-blood glucose monitoring performed and treated as ordered. Barrier cream and foam dressing applied to buttock.Telemetry monitoring in place. Heparin injection given. Soft call bell provided. Pt's bed in lowest position, moonboots on, and pt's wife at bedside.

## 2018-04-22 NOTE — NURSING
Reported to Dr. [facility] pt c/o of heart burn and slight abd discomfort while having tube feeds at 40ml/hr for an hour. Residual was 100ml and returned. Tube feeds are held for an hour then this RN will restart feeds. Will continue to monitor.

## 2018-04-22 NOTE — PROGRESS NOTES
[facility]  Gastroenterology  Progress Note    Patient Name: Lv Crocker  MRN: [id number]  Admission Date: 3/11/2018  Hospital Length of Stay: 41 days  Code Status: Full Code   Attending Provider: Behzad Lara Jr.,*  Consulting Provider: Shaggy Gr MD  Primary Care Physician: Philippe Mohr MD  Principal Problem: Acute renal failure with acute tubular necrosis superimposed on stage 3 chronic kidney disease      Subjective:     Interval History:   GI was called back regarding high gastric residual.   He had trach and G-tube placement.   Now on TF and reporting to have high residuals ~200cc after target TF goal of 60 and patient reports some abdominal discomfort.   Denies having any fevers.   Reports having normal bowel movements.  On narcotic pain medications.   Denies nausea or vomiting.   Undergoing repeat SLP evaluation for initiation of oral intake.    Review of Systems   Constitutional: Positive for activity change. Negative for chills, fatigue and fever.   HENT: Negative for drooling, hearing loss, trouble swallowing and voice change.    Eyes: Negative for pain and visual disturbance.   Respiratory: Negative for cough and wheezing.         + trach   Cardiovascular: Negative for chest pain and palpitations.   Gastrointestinal: Negative for abdominal pain, nausea and vomiting.        + PEG   Genitourinary: Negative for difficulty urinating and flank pain.   Musculoskeletal: Positive for gait problem and myalgias. Negative for arthralgias, back pain and neck pain.   Skin: Positive for color change and wound. Negative for rash.   Allergic/Immunologic: Positive for immunocompromised state.   Neurological: Positive for weakness. Negative for dizziness, numbness and headaches.   Psychiatric/Behavioral: Positive for hallucinations and sleep disturbance. Negative for agitation. The patient is not nervous/anxious.      Objective:     Vital Signs (Most Recent):  Temp: 97.7 °F  (36.5 °C) (04/22/18 1235)  Pulse: 105 (04/22/18 1235)  Resp: 18 (04/22/18 1235)  BP: 95/65 (04/22/18 1235)  SpO2: 100 % (04/22/18 1235) Vital Signs (24h Range):  Temp:  [97.5 °F (36.4 °C)-97.8 °F (36.6 °C)] 97.7 °F (36.5 °C)  Pulse:  [101-111] 105  Resp:  [15-18] 18  SpO2:  [95 %-100 %] 100 %  BP: ()/(57-65) 95/65     Weight: 74.8 kg (164 lb 14.5 oz) (04/20/18 1100)  Body mass index is 25.83 kg/m².      Intake/Output Summary (Last 24 hours) at 04/22/18 1339  Last data filed at 04/22/18 0900   Gross per 24 hour   Intake            82.51 ml   Output                0 ml   Net            82.51 ml       Lines/Drains/Airways     Central Venous Catheter Line                 Hemodialysis Catheter 04/04/18 1107 left internal jugular 18 days          Drain                 Gastrostomy/Enterostomy 04/04/18 1127 Gastrostomy tube w/ balloon LUQ 18 days          Airway                 Surgical Airway 03/28/18 1625 Shiley 24 days          Pressure Ulcer                 Pressure Injury 04/12/18 0705 Left Heel Deep tissue injury 10 days         Pressure Injury 04/17/18 1200 Left Buttocks Deep tissue injury 5 days         Pressure Injury 04/17/18 1200 Right Achilles Deep tissue injury 5 days          Peripheral Intravenous Line                 Midline Catheter Insertion/Assessment  - Single Lumen 04/05/18 0701 Left brachial vein 20g x 10cm 17 days         Peripheral IV - Single Lumen 04/18/18 1405 Left Forearm 3 days                Physical Exam   Constitutional: He is oriented to person, place, and time. He appears well-developed.   HENT:   Trach    Eyes: No scleral icterus.   Neck: Neck supple.   Cardiovascular: Normal rate.  Exam reveals no gallop and no friction rub.    Pulmonary/Chest: Effort normal. No respiratory distress. He has rales.   Abdominal: Soft. Bowel sounds are normal. He exhibits no distension and no mass. There is no tenderness. There is no rebound and no guarding. No hernia.   G-tube. Sutured in skin. C/D/I    Musculoskeletal: He exhibits no edema or tenderness.   Neurological: He is alert and oriented to person, place, and time.   Skin: Skin is warm.   Psychiatric: He has a normal mood and affect. His behavior is normal.   Vitals reviewed.    Lab Results   Component Value Date    WBC 2.69 (L) 04/22/2018    HGB 8.2 (L) 04/22/2018    HCT 26.6 (L) 04/22/2018     (H) 04/22/2018     04/22/2018     Lab Results   Component Value Date    INR 1.0 03/27/2018     Lab Results   Component Value Date     04/22/2018    K 4.5 04/22/2018    CREATININE 2.9 (H) 04/22/2018     Lab Results   Component Value Date    ALBUMIN 2.1 (L) 04/22/2018    ALT 11 04/22/2018    AST 11 04/22/2018    ALKPHOS 210 (H) 04/22/2018    BILITOT 0.4 04/22/2018     No results found for: AFP  Lab Results   Component Value Date    LIPASE 7 04/22/2018    AMYLASE 31 01/11/2018     Lab Results   Component Value Date    TACROLIMUS 12.9 04/22/2018       Imaging:  Reviewed and as noted in HPI.         Assessment/Plan:     Gastroparesis    Reports to have not tolerating target goals of TF. Has long history of diabetes and now on narcotics as well as critical illness causing delayed gastric emptying.   We recommend not checking gastric residuals. (~150-200ml is expected and up to 400ml should be tolerated as long as aspiration precautions are in place).  Ideally gastric emptying would be helpful but it is not recommended while on narcotics.   Recommend weaning of narcotics.   Consider CT A/P and agree with SLP evaluation prior to initiation of oral intake.  If continues to have high residuals, would recommend IR G-J tube conversion after maturation of G-tube tract (ideally after 6-8 weeks after G-tube placement).  Nutrition consult for gastroparesis education              Thank you for your consult. I will follow-up with patient. Please contact us if you have any additional questions.    Shaggy Gr MD  Gastroenterology  [facility]    I was present with the fellow during the above evaluation, including history and exam.  I discussed the case with the fellow and agree with the findings and plan as documented in the fellow's note.

## 2018-04-22 NOTE — PROGRESS NOTES
[facility]  Endocrinology  Progress Note    Admit Date: 3/11/2018     Reason for Consult: abnormal TFTs    Surgical Procedure and Date: s/p heart transplant 2014     Diabetes diagnosis year: 7yo (T1DM)     Home Diabetes Medications:    Levemir 15u qHS  Novolog 4u AC     How often checking glucose at home? 4 times daily   BG readings on regimen: 150-200s  Hypoglycemia on the regimen?  Yes, rarely - treats appropriately (light snack, glucose tab)  Missed doses on regimen?  No        Diabetes Complications include:     Hyperglycemia, Hypoglycemia  and Diabetic chronic kidney disease          Complicating diabetes co morbidities:   N/A     HPI:   Patient is a 42 y.o. male with a diagnosis of T1DM, HTN, hypothyroidism, s/p heart transplant.  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 He required serial paracentesis and thoracentesis until he underwent a VATS with pleurX catheter placement on 1/11/2018 and removed 2/7/18.       Presented to hospital secondary to diarrhea and cough.  Upon initial labs, TSH was decreased (0.101) and free T4 1.33.  Endocrinology consulted to assist in management of these labs.  He was last seen in clinic by Kamala Vázquez NP 11/2017.   Previous hospitalization, endocrine consulted for same problem.  During that visit, recommended decreasing LT4 to 125mcg daily. Unfortunately, patient was discharged on previous dose of 150mcg daily.     Interval HPI:   Overnight events:  AF, low normal BP.  TF still intermittently held given increased residuals. Primary team working up etiology - KUB planned today    Eating:   NPO  Nausea: No - some abdominal discomfort with TF at 40cc/h  Hypoglycemia and intervention: No  Fever: No  TPN and/or TF: ordered but currently held    BP 95/65 (BP Location: Right arm, Patient Position: Sitting)   Pulse 105   Temp 97.7 °F (36.5 °C) (Oral)   " Resp 18   Ht 5' 7" (1.702 m)   Wt 74.8 kg (164 lb 14.5 oz)   SpO2 100%   BMI 25.83 kg/m²       Labs Reviewed and Include      Recent Labs  Lab 04/22/18  0532   *   CALCIUM 10.6*   ALBUMIN 2.1*   PROT 6.2      K 4.5   CO2 22*      BUN 38*   CREATININE 2.9*   ALKPHOS 210*   ALT 11   AST 11   BILITOT 0.4     Lab Results   Component Value Date    WBC 2.69 (L) 04/22/2018    HGB 8.2 (L) 04/22/2018    HCT 26.6 (L) 04/22/2018     (H) 04/22/2018     04/22/2018       Recent Labs  Lab 04/21/18  0334   TSH 7.085*   FREET4 0.87     Lab Results   Component Value Date    HGBA1C 5.1 04/05/2018       Nutritional status:   Body mass index is 25.83 kg/m².  Lab Results   Component Value Date    ALBUMIN 2.1 (L) 04/22/2018    ALBUMIN 2.1 (L) 04/21/2018    ALBUMIN 2.1 (L) 04/20/2018     Lab Results   Component Value Date    PREALBUMIN 13 (L) 04/08/2018    PREALBUMIN 5 (L) 03/15/2018    PREALBUMIN 18 (L) 03/24/2015       Estimated Creatinine Clearance: 30.4 mL/min (A) (based on SCr of 2.9 mg/dL (H)).    Accu-Checks  Recent Labs      04/20/18   2347  04/21/18   0418  04/21/18   0755  04/21/18   1046  04/21/18   1522  04/21/18   2107  04/22/18   0041  04/22/18   0434  04/22/18   0833  04/22/18   1201   POCTGLUCOSE  223*  205*  202*  190*  211*  195*  249*  177*  147*  157*       Current Medications and/or Treatments Impacting Glycemic Control  Immunotherapy:  Immunosuppressants         Stop Route Frequency     tacrolimus capsule 1 mg      -- SL 2 times daily        Steroids:   Hormones     Start     Stop Route Frequency Ordered    04/19/18 0900  predniSONE tablet 5 mg      -- PER G TUBE Daily 04/18/18 1106        Pressors:    Autonomic Drugs     Start     Stop Route Frequency Ordered    04/13/18 0859  midodrine tablet 10 mg      -- Oral As needed (PRN) 04/13/18 0800    04/09/18 1500  midodrine tablet 2.5 mg      -- Oral 3 times daily 04/09/18 1006        Hyperglycemia/Diabetes Medications: " Antihyperglycemics     Start     Stop Route Frequency Ordered    04/11/18 0015  insulin regular (Humulin R) 100 Units in sodium chloride 0.9% 100 mL infusion     Question:  Insulin Rate Adjustment (DO NOT MODIFY ANSWER)  Answer:  [email or web address]\Images\[email or web address]\[email or web address] [id number]    -- IV Continuous 04/10/18 2313    04/10/18 2221  insulin aspart U-100 pen 0-4 Units      -- SubQ Every 4 hours PRN 04/10/18 2122          ASSESSMENT and PLAN    Type 1 diabetes mellitus with stage 3 chronic kidney disease    BG goal 140-180, bg at goal     Cont transition to 0.7u/hr, will likely need higher rate when TF resumed    Pt is T1DM, do not suspend insulin >1 hr   If TF held, decrease insulin rate to 0.2u/hr - known to have controlled BG on basal needs of lev 5 daily/ 0.2u/hr during this hospitalization        Discharge Recommendations:  TBD.        On enteral nutrition    TF were at goal; now with intermittent dc due to abd discomfort and high residual  May increase insulin needs        Current chronic use of systemic steroids    Can increase insulin requirement        CKD (chronic kidney disease), stage IV    Titrate cautiously.  Caution with insulin stacking.  Avoid hypoglycemia.        Acute respiratory failure with hypoxia    Per primary  S/p trach.  Practicing with speech valve.  Remains NPO        Hypothyroidism due to Hashimoto's thyroiditis    continue iv levothyroxine 75mcg daily  tsh improving          Heart transplanted    Per transplant  Avoid hypoglycemia  On PDN and prograf - could lead to prandial elevations and insulin resistance.            Deonna Larry MD  Endocrinology  [facility]

## 2018-04-22 NOTE — ASSESSMENT & PLAN NOTE
- unclear if  worsening gastroparesis or new partial obstruction  - patient having persistent GI discomfort/nausea on tube feed rate at 40/hr requiring intermittent d/c  - KUB to be done today.  CMV PCR pending  - Will consider Gi for gastroparesis/ CT abd for eval if persistent.   - contraindicated for Reglan due to transplant meds

## 2018-04-22 NOTE — SUBJECTIVE & OBJECTIVE
"Interval HPI:   Overnight events:  AF, low normal BP.  TF still intermittently held given increased residuals. Primary team working up etiology - KUB planned today    Eating:   NPO  Nausea: No - some abdominal discomfort with TF at 40cc/h  Hypoglycemia and intervention: No  Fever: No  TPN and/or TF: ordered but currently held    BP 95/65 (BP Location: Right arm, Patient Position: Sitting)   Pulse 105   Temp 97.7 °F (36.5 °C) (Oral)   Resp 18   Ht 5' 7" (1.702 m)   Wt 74.8 kg (164 lb 14.5 oz)   SpO2 100%   BMI 25.83 kg/m²     Labs Reviewed and Include      Recent Labs  Lab 04/22/18  0532   *   CALCIUM 10.6*   ALBUMIN 2.1*   PROT 6.2      K 4.5   CO2 22*      BUN 38*   CREATININE 2.9*   ALKPHOS 210*   ALT 11   AST 11   BILITOT 0.4     Lab Results   Component Value Date    WBC 2.69 (L) 04/22/2018    HGB 8.2 (L) 04/22/2018    HCT 26.6 (L) 04/22/2018     (H) 04/22/2018     04/22/2018       Recent Labs  Lab 04/21/18  0334   TSH 7.085*   FREET4 0.87     Lab Results   Component Value Date    HGBA1C 5.1 04/05/2018       Nutritional status:   Body mass index is 25.83 kg/m².  Lab Results   Component Value Date    ALBUMIN 2.1 (L) 04/22/2018    ALBUMIN 2.1 (L) 04/21/2018    ALBUMIN 2.1 (L) 04/20/2018     Lab Results   Component Value Date    PREALBUMIN 13 (L) 04/08/2018    PREALBUMIN 5 (L) 03/15/2018    PREALBUMIN 18 (L) 03/24/2015       Estimated Creatinine Clearance: 30.4 mL/min (A) (based on SCr of 2.9 mg/dL (H)).    Accu-Checks  Recent Labs      04/20/18   2347  04/21/18   0418  04/21/18   0755  04/21/18   1046  04/21/18   1522  04/21/18   2107  04/22/18   0041  04/22/18   0434  04/22/18   0833  04/22/18   1201   POCTGLUCOSE  223*  205*  202*  190*  211*  195*  249*  177*  147*  157*       Current Medications and/or Treatments Impacting Glycemic Control  Immunotherapy:  Immunosuppressants         Stop Route Frequency     tacrolimus capsule 1 mg      -- SL 2 times daily        Steroids: "   Hormones     Start     Stop Route Frequency Ordered    04/19/18 0900  predniSONE tablet 5 mg      -- PER G TUBE Daily 04/18/18 1106        Pressors:    Autonomic Drugs     Start     Stop Route Frequency Ordered    04/13/18 0859  midodrine tablet 10 mg      -- Oral As needed (PRN) 04/13/18 0800    04/09/18 1500  midodrine tablet 2.5 mg      -- Oral 3 times daily 04/09/18 1006        Hyperglycemia/Diabetes Medications: Antihyperglycemics     Start     Stop Route Frequency Ordered    04/11/18 0015  insulin regular (Humulin R) 100 Units in sodium chloride 0.9% 100 mL infusion     Question:  Insulin Rate Adjustment (DO NOT MODIFY ANSWER)  Answer:  [email or web address]\Images\Pharmacy\InsulinInfusions\InsulinRegAdj [id number].pdf    -- IV Continuous 04/10/18 2313    04/10/18 2221  insulin aspart U-100 pen 0-4 Units      -- SubQ Every 4 hours PRN 04/10/18 2122

## 2018-04-22 NOTE — PLAN OF CARE
Problem: Physical Therapy Goal  Goal: Physical Therapy Goal  Goals to be met by: 18     Patient will increase functional independence with mobility by performin. Supine to sit with Moderate Assistance.  2. Sit to supine with Moderate Assistance.  3. Rolling to Left and Right with Minimal Assistance.   4. Sit to stand transfer with Moderate Assistance.  5. Bed to chair transfer with Maximum Assistance.  6. Gait  x 5 feet with Minimal Assistance.   7.  Pt to perf and be compliant with LE HEP to improve vascularity and mm strength.          Outcome: Ongoing (interventions implemented as appropriate)  Progressing towards goals    Corey Hernández DPT  2018

## 2018-04-22 NOTE — PT/OT/SLP PROGRESS
Occupational Therapy   Treatment    Name: Lv Crocker  MRN: [id number]  Admitting Diagnosis:  Acute renal failure with acute tubular necrosis superimposed on stage 3 chronic kidney disease  18 Days Post-Op    Recommendations:     Discharge Recommendations: rehabilitation facility  Discharge Equipment Recommendations:   (TBD)  Barriers to discharge:       Subjective     Communicated with: rn prior to session.  Pain/Comfort:  · Pain Rating 1: 0/10  · Pain Rating Post-Intervention 1: 0/10    Patients cultural, spiritual, Scientologist conflicts given the current situation: none    Objective:     Patient found with: telemetry, tracheostomy, oxygen, peripheral IV, PEG Tube    General Precautions: Standard, fall, NPO, aspiration   Orthopedic Precautions:N/A   Braces: N/A     Occupational Performance:    Bed Mobility:    · Patient completed Rolling/Turning to Left with  total assistance  · Patient completed Scooting/Bridging with total assistance  · Patient completed Supine to Sit with total assistance     Functional Mobility/Transfers:  · Patient completed Sit <> Stand Transfer with total assistance and Performed twice   with  no assistive device   · Patient completed Bed <> Chair Transfer using Stand Pivot technique with total assistance with no assistive device  · Functional Mobility: Pt unable to take steps at this time.     Activities of Daily Living:  · LB Dressing: maximal assistance and total assistance max a to brandi pants. Pt able to pull pants from knee to mid thigh in sitting to prepare for donning pants in standing; total assist to brandi socks    Patient left up in chair with all lines intact, call button in reach and RN and spouse present    [facility] 6 Click:  [facility] Total Score: 7    Treatment & Education:  Pt sat EOB ~15 minutes at max.  Pt completed LBD of donning pants in standing. PT aided in standing and w/ standing balance as OT assisted in donning of pants.   Pt t/f from EOB to medi-chair. OT assisted  in balance as PT cue for posture and assisted w/ lines.  Pt educated on importance of upright sitting, AROM for BUE, and particiapting in therapy.  PT and OT provided psycho-social support for pt in regards to CLOF, pt progression, encouragement, and emotional support.    Education:    Assessment:     Lv Crocker is a 42 y.o. male with a medical diagnosis of Acute renal failure with acute tubular necrosis superimposed on stage 3 chronic kidney disease.  He presents with impairments listed below. Pt did well to participate and tolerate in session. Pt displayed improved tolerance for oob activities. Pt is progressing towards goals. Pt would benefit from skilled OT services to improve independence and overall occupational functioning.      Performance deficits affecting function are weakness, impaired endurance, impaired self care skills, impaired functional mobilty, gait instability, impaired balance, decreased upper extremity function, decreased lower extremity function, decreased ROM, decreased coordination, impaired fine motor, impaired skin, impaired cardiopulmonary response to activity.      Rehab Prognosis:  good; patient would benefit from acute skilled OT services to address these deficits and reach maximum level of function.       Plan:     Patient to be seen 5 x/week to address the above listed problems via self-care/home management, therapeutic activities, therapeutic exercises, neuromuscular re-education  · Plan of Care Expires: 05/06/18  · Plan of Care Reviewed with: patient, spouse    This Plan of care has been discussed with the patient who was involved in its development and understands and is in agreement with the identified goals and treatment plan    GOALS:    Occupational Therapy Goals        Problem: Occupational Therapy Goal    Goal Priority Disciplines Outcome Interventions   Occupational Therapy Goal     OT, PT/OT Ongoing (interventions implemented as appropriate)    Description:   Goals to be met by: 4/23/2018    Pt will follow 75% of motor commands with <2 verbal cues for repetition of task to maximize engagement in grooming task.--MET  Pt will complete feeding with mod A.   Pt will complete supine with HOB slightly elevated to seated at EOB with mod A in prep for seated grooming task.  Pt will engage in BUE exercises in orders to increase strength to 3+/5 in BUE's to increase engagement in seated grooming task  Pt will sit at EOB for approx 10 minutes with mod A and unilateral UE support to complete grooming task  Pt will complete sit>stand from EOB with bed in lowest position with mod A in prep for transfer to [facility]                      Time Tracking:     OT Date of Treatment: 04/22/18  OT Start Time: 1100  OT Stop Time: 1123  OT Total Time (min): 23 min    Billable Minutes:Self Care/Home Management 10 minutes  Therapeutic Activity 13 minutes    Ronaldo Carmen, OT  4/22/2018

## 2018-04-22 NOTE — PT/OT/SLP PROGRESS
Physical Therapy Treatment    Patient Name:  Lv Crocker   MRN:  [id number]    Recommendations:     Discharge Recommendations:  rehabilitation facility   Discharge Equipment Recommendations:     Barriers to discharge: Inaccessible home and Decreased caregiver support    Assessment:     Lv Crocker is a 42 y.o. male admitted with a medical diagnosis of Acute renal failure with acute tubular necrosis superimposed on stage 3 chronic kidney disease.  He presents with the following impairments/functional limitations:  weakness, impaired functional mobilty, gait instability, impaired endurance, impaired balance, impaired self care skills, decreased lower extremity function, decreased upper extremity function, impaired cardiopulmonary response to activity, impaired skin, pain.  Pt tolerated treatment session c mod c/o of sacral wound pain and weakness.  Pt transferred to medichair today to assist in approving pt's tolerance for upright position and responded well to chair.  Pt tolerated sitting upright in medichair for ~1.5 hours but is limited by pain from sacral wound. Pt continuing to require total - max A for functional mobility.  Pt would benefit from continued skilled acute PT 5x/wk to improve functional mobility.      Rehab Prognosis:  fair; patient would benefit from acute skilled PT services to address these deficits and reach maximum level of function.      Recent Surgery: Procedure(s) (LRB):  INSERTION-CATHETER-PERM-A-CATH (Left)  INSERTION-TUBE-GASTROSTOMY (N/A) 18 Days Post-Op    Plan:     During this hospitalization, patient to be seen 5 x/week to address the above listed problems via gait training, therapeutic activities, therapeutic exercises, neuromuscular re-education  · Plan of Care Expires:  05/06/18   Plan of Care Reviewed with: patient, spouse    Subjective     Communicated with RN and OT prior to session.  Patient found HOB elevated and wife present upon PT entry to room,  "agreeable to treatment.      Chief Complaint: fatigue; sacral wound pain  Patient comments/goals: "I felt good." when asked how pt felt sitting up in medichair  Pain/Comfort:  · Pain Rating 1: 0/10    Patients cultural, spiritual, Yazidi conflicts given the current situation: none    Objective:     Patient found with: telemetry, tracheostomy, oxygen, peripheral IV, PEG Tube, pressure relief boots     General Precautions: Standard, fall, NPO, aspiration   Orthopedic Precautions:N/A   Braces: N/A     Functional Mobility:  · Bed Mobility:     · Rolling Left:  total assistance  · Scooting: total assistance  · Supine to Sit: total assistance  · Transfers:     · Sit to Stand:  maximal assistance with no AD  · Bed to Chair: maximal assistance with  no AD  using  Stand Pivot  · Balance: static sitting (max A); dynamic sitting (max A); static standing (total A)      AM-PAC 6 CLICK MOBILITY  Turning over in bed (including adjusting bedclothes, sheets and blankets)?: 2  Sitting down on and standing up from a chair with arms (e.g., wheelchair, bedside commode, etc.): 2  Moving from lying on back to sitting on the side of the bed?: 2  Moving to and from a bed to a chair (including a wheelchair)?: 2  Need to walk in hospital room?: 1  Climbing 3-5 steps with a railing?: 1  Total Score: 10       Therapeutic Activities and Exercises:  Educated on PT role/POC; benefits of OOB activities; performing BLE/BUE exercises daily - v/u  Sat EOB x15 min Max A  Weight shift in sitting EOB c max A  Static standing 1x2min to assist c OT to don pt's pants  Transferred to medichair stand pivot max A    Patient left HOB elevated with all lines intact, call button in reach and RN notified..    GOALS:    Physical Therapy Goals        Problem: Physical Therapy Goal    Goal Priority Disciplines Outcome Goal Variances Interventions   Physical Therapy Goal     PT/OT, PT Ongoing (interventions implemented as appropriate)     Description:  Goals to be " met by: 18     Patient will increase functional independence with mobility by performin. Supine to sit with Moderate Assistance.  2. Sit to supine with Moderate Assistance.  3. Rolling to Left and Right with Minimal Assistance.   4. Sit to stand transfer with Moderate Assistance.  5. Bed to chair transfer with Maximum Assistance.  6. Gait  x 5 feet with Minimal Assistance.   7.  Pt to perf and be compliant with LE HEP to improve vascularity and mm strength.                           Time Tracking:     PT Received On: 18  PT Start Time: 1100     PT Stop Time: 1124  PT Total Time (min): 24 min     Billable Minutes: Therapeutic Activity 12 min and Neuromuscular Re-education 12 min    Treatment Type: Treatment  PT/PTA: PT     PTA Visit Number: 0     Corey Hernández, PT  2018

## 2018-04-22 NOTE — PLAN OF CARE
Problem: Occupational Therapy Goal  Goal: Occupational Therapy Goal  Goals to be met by: 4/23/2018    Pt will follow 75% of motor commands with <2 verbal cues for repetition of task to maximize engagement in grooming task.--MET  Pt will complete feeding with mod A.   Pt will complete supine with HOB slightly elevated to seated at EOB with mod A in prep for seated grooming task.  Pt will engage in BUE exercises in orders to increase strength to 3+/5 in BUE's to increase engagement in seated grooming task  Pt will sit at EOB for approx 10 minutes with mod A and unilateral UE support to complete grooming task  Pt will complete sit>stand from EOB with bed in lowest position with mod A in prep for transfer to BSC     Continue OT POC     Comments: Ronaldo Carmen OTR/L  4/22/2018

## 2018-04-22 NOTE — ASSESSMENT & PLAN NOTE
- TTE 3/26/18 showed normal graft function but has known history of restrictive CM post transplant.  - Decrease pred , Tacro level high for the last two days 11 and 12.9 today. Evening dose held today. Will continue to adjust tomorrow.  goal 6-8/10  - Tacro- current dose is 2/1 daily  - Cellcept remains on hold due to leukopenia

## 2018-04-22 NOTE — ASSESSMENT & PLAN NOTE
TF were at goal; now with intermittent dc due to abd discomfort and high residual  May increase insulin needs

## 2018-04-22 NOTE — PROGRESS NOTES
Patient complaining of heart burn/abd discomfort. Feels nausea and requesting tube feed to be held. Residual at 100 per nurse and going at 40cc/hr.  KUB ordered earlier to be done at bedside. Tube feeds to be held for an hour then start again at lower rate  - will see patient at bedside for review.

## 2018-04-22 NOTE — SUBJECTIVE & OBJECTIVE
Subjective:     Interval History:   GI was called back regarding high gastric residual.   He had trach and G-tube placement.   Now on TF and reporting to have high residuals ~200cc after target TF goal of 60 and patient reports some abdominal discomfort.   Denies having any fevers.   Reports having normal bowel movements.  On narcotic pain medications.   Denies nausea or vomiting.   Undergoing repeat SLP evaluation for initiation of oral intake.    Review of Systems   Constitutional: Positive for activity change. Negative for chills, fatigue and fever.   HENT: Negative for drooling, hearing loss, trouble swallowing and voice change.    Eyes: Negative for pain and visual disturbance.   Respiratory: Negative for cough and wheezing.         + trach   Cardiovascular: Negative for chest pain and palpitations.   Gastrointestinal: Negative for abdominal pain, nausea and vomiting.        + PEG   Genitourinary: Negative for difficulty urinating and flank pain.   Musculoskeletal: Positive for gait problem and myalgias. Negative for arthralgias, back pain and neck pain.   Skin: Positive for color change and wound. Negative for rash.   Allergic/Immunologic: Positive for immunocompromised state.   Neurological: Positive for weakness. Negative for dizziness, numbness and headaches.   Psychiatric/Behavioral: Positive for hallucinations and sleep disturbance. Negative for agitation. The patient is not nervous/anxious.      Objective:     Vital Signs (Most Recent):  Temp: 97.7 °F (36.5 °C) (04/22/18 1235)  Pulse: 105 (04/22/18 1235)  Resp: 18 (04/22/18 1235)  BP: 95/65 (04/22/18 1235)  SpO2: 100 % (04/22/18 1235) Vital Signs (24h Range):  Temp:  [97.5 °F (36.4 °C)-97.8 °F (36.6 °C)] 97.7 °F (36.5 °C)  Pulse:  [101-111] 105  Resp:  [15-18] 18  SpO2:  [95 %-100 %] 100 %  BP: ()/(57-65) 95/65     Weight: 74.8 kg (164 lb 14.5 oz) (04/20/18 1100)  Body mass index is 25.83 kg/m².      Intake/Output Summary (Last 24 hours) at  04/22/18 1339  Last data filed at 04/22/18 0900   Gross per 24 hour   Intake            82.51 ml   Output                0 ml   Net            82.51 ml       Lines/Drains/Airways     Central Venous Catheter Line                 Hemodialysis Catheter 04/04/18 1107 left internal jugular 18 days          Drain                 Gastrostomy/Enterostomy 04/04/18 1127 Gastrostomy tube w/ balloon LUQ 18 days          Airway                 Surgical Airway 03/28/18 1625 Shiley 24 days          Pressure Ulcer                 Pressure Injury 04/12/18 0705 Left Heel Deep tissue injury 10 days         Pressure Injury 04/17/18 1200 Left Buttocks Deep tissue injury 5 days         Pressure Injury 04/17/18 1200 Right Achilles Deep tissue injury 5 days          Peripheral Intravenous Line                 Midline Catheter Insertion/Assessment  - Single Lumen 04/05/18 0701 Left brachial vein 20g x 10cm 17 days         Peripheral IV - Single Lumen 04/18/18 1405 Left Forearm 3 days                Physical Exam   Constitutional: He is oriented to person, place, and time. He appears well-developed.   HENT:   Trach    Eyes: No scleral icterus.   Neck: Neck supple.   Cardiovascular: Normal rate.  Exam reveals no gallop and no friction rub.    Pulmonary/Chest: Effort normal. No respiratory distress. He has rales.   Abdominal: Soft. Bowel sounds are normal. He exhibits no distension and no mass. There is no tenderness. There is no rebound and no guarding. No hernia.   G-tube. Sutured in skin. C/D/I   Musculoskeletal: He exhibits no edema or tenderness.   Neurological: He is alert and oriented to person, place, and time.   Skin: Skin is warm.   Psychiatric: He has a normal mood and affect. His behavior is normal.   Vitals reviewed.    Lab Results   Component Value Date    WBC 2.69 (L) 04/22/2018    HGB 8.2 (L) 04/22/2018    HCT 26.6 (L) 04/22/2018     (H) 04/22/2018     04/22/2018     Lab Results   Component Value Date    INR 1.0  03/27/2018     Lab Results   Component Value Date     04/22/2018    K 4.5 04/22/2018    CREATININE 2.9 (H) 04/22/2018     Lab Results   Component Value Date    ALBUMIN 2.1 (L) 04/22/2018    ALT 11 04/22/2018    AST 11 04/22/2018    ALKPHOS 210 (H) 04/22/2018    BILITOT 0.4 04/22/2018     No results found for: AFP  Lab Results   Component Value Date    LIPASE 7 04/22/2018    AMYLASE 31 01/11/2018     Lab Results   Component Value Date    TACROLIMUS 12.9 04/22/2018       Imaging:  Reviewed and as noted in HPI.

## 2018-04-22 NOTE — ASSESSMENT & PLAN NOTE
Reports to have not tolerating target goals of TF. Has long history of diabetes and now on narcotics as well as critical illness causing delayed gastric emptying.   We recommend not checking gastric residuals. (~150-200ml is expected and up to 500ml should be tolerated as long as aspiration precautions are in place).  Ideally gastric emptying would be helpful but it is not recommended while on narcotics.   Recommend weaning of narcotics.   Consider CT A/P and agree with SLP evaluation prior to initiation of oral intake.  If continues to have high residuals, would recommend IR G-J tube conversion after maturation of G-tube tract (ideally after 6-8 weeks after G-tube placement).  Nutrition consult for gastroparesis education

## 2018-04-22 NOTE — NURSING
Reported to Dr. Westbrook pt had residual of 50ml. Restarted tube feeding at 10ml/hr per verbal order. Will increase rate by 10ml/hr if tolerated. Will continue to monitor.

## 2018-04-22 NOTE — PROGRESS NOTES
Residuals 200mL. Pt c/o nausea and abdominal pain. Pt wanted TF turned off.  Dr. Wong notified and stated to hold TF. TM.

## 2018-04-22 NOTE — NURSING
Reported to Dr. Westbrook[facility] pt denies abd pain, abd fullness, or abd discomfort. Residual was 50ml and returned. Tube feeds were restarted at 40ml/hr. Will continue to monitor.

## 2018-04-22 NOTE — PLAN OF CARE
Problem: Patient Care Overview  Goal: Plan of Care Review  Outcome: Ongoing (interventions implemented as appropriate)  Pt is AAOX4, total assist, and VSS. Midline dressing changed. G tube dressing changed. Insulin drip infusing at 0.8 units/hr-blood glucose monitoring performed and treated as ordered. Telemetry monitoring in place. Tube feedings restarted and at goal of 40ml/hr. Pt c/o of nausea-refused meds and reported to MD. Denies pain. Pt's bed in lowest position, call bell within reach, and nonskid socks on. Pt's wife at bedside.

## 2018-04-22 NOTE — PROGRESS NOTES
[facility]  Heart Transplant  Progress Note    Patient Name: Lv Crocker  MRN: [id number]  Admission Date: 3/11/2018  Hospital Length of Stay: 41 days  Attending Physician: Behzad Lara Jr.,*  Primary Care Provider: Philippe Mohr MD  Principal Problem:Acute renal failure with acute tubular necrosis superimposed on stage 3 chronic kidney disease    Subjective:     Interval History:    Tacrolimus level elevated this morning, 12.9 from 11 yesterday. Tube feeds held last night due to nausea abd pain and residual of >200. Restarted again this morning at 10mL per hour to increase hourly at 10mL. No HD session yesterday.    Continuous Infusions:   insulin (HUMAN R) infusion (adults) 0.7 Units/hr (04/22/18 0037)     Scheduled Meds:   sodium chloride 0.9%   Intravenous Once    acetaminophen  999.0148 mg Per G Tube TID    cetirizine  5 mg Per G Tube Daily    chlorhexidine  15 mL Mouth/Throat BID    epoetin jose miguel (PROCRIT) injection  4,000 Units Intravenous Every Mon, Wed, Fri    escitalopram oxalate  20 mg Per G Tube Daily    famotidine  20 mg Per G Tube QHS    heparin (porcine)  5,000 Units Subcutaneous Q12H    levothyroxine  75 mcg Intravenous Daily    midodrine  2.5 mg Oral TID    polyethylene glycol  17 g Oral Daily    predniSONE  5 mg Per G Tube Daily    QUEtiapine  50 mg Oral QHS    sennosides 8.8 mg/5 ml  5 mL Oral QHS    tacrolimus  1 mg Sublingual Daily    tacrolimus  2 mg Sublingual Daily     PRN Meds:sodium chloride 0.9%, sodium chloride 0.9%, heparin (porcine), insulin aspart U-100, midodrine, ondansetron, oxyCODONE, povidone-iodine, ramelteon    Review of patient's allergies indicates:   Allergen Reactions    No known drug allergies      Objective:     Vital Signs (Most Recent):  Temp: 97.7 °F (36.5 °C) (04/22/18 0834)  Pulse: 104 (04/22/18 0834)  Resp: 18 (04/22/18 0834)  BP: 96/64 (04/22/18 0834)  SpO2: 99 % (04/22/18 0834) Vital Signs (24h Range):  Temp:   [97.3 °F (36.3 °C)-97.8 °F (36.6 °C)] 97.7 °F (36.5 °C)  Pulse:  [101-108] 104  Resp:  [15-18] 18  SpO2:  [94 %-99 %] 99 %  BP: ()/(57-65) 96/64     Patient Vitals for the past 72 hrs (Last 3 readings):   Weight   04/20/18 1100 74.8 kg (164 lb 14.5 oz)   04/20/18 0600 74.8 kg (164 lb 14.5 oz)     Body mass index is 25.83 kg/m².      Intake/Output Summary (Last 24 hours) at 04/22/18 0951  Last data filed at 04/22/18 0900   Gross per 24 hour   Intake           182.51 ml   Output                0 ml   Net           182.51 ml       Hemodynamic Parameters:       Telemetry:    Physical Exam     Constitutional: He is oriented to person, place, and time.   Generalized weakness but normal ROM   Neck: No JVD present. Trache in place and well  secured  Cardiovascular: Normal rate, regular rhythm and normal heart sounds.    Pulmonary/Chest: Effort normal and breath sounds normal.   Abdominal: Soft. Bowel sounds are normal.   Musculoskeletal: Normal range of motion. He exhibits no edema or tenderness.   Neurological: He is alert and oriented to person, place, and time.   Skin: Skin is warm and dry.   Psychiatric: - able to interact and ask questions this morning  Nursing note and vitals reviewed.    Significant Labs:  CBC:    Recent Labs  Lab 04/20/18 0358 04/21/18  0334 04/22/18  0532   WBC 3.30* 3.46* 2.69*   RBC 2.71* 2.66* 2.60*   HGB 8.4* 8.3* 8.2*   HCT 27.6* 27.2* 26.6*    215 185   * 102* 102*   MCH 31.0 31.2* 31.5*   MCHC 30.4* 30.5* 30.8*     BNP:  No results for input(s): BNP in the last 168 hours.    Invalid input(s): BNPTRIAGELBLO  CMP:    Recent Labs  Lab 04/20/18 0358 04/21/18  0334 04/22/18  0532   * 216* 173*   CALCIUM 9.8 9.2 10.6*   ALBUMIN 2.1* 2.1* 2.1*   PROT 6.4 6.2 6.2   * 133* 136   K 5.6* 3.8 4.5   CO2 23 22* 22*   CL 99 101 101   BUN 41* 25* 38*   CREATININE 2.8* 1.8* 2.9*   ALKPHOS 208* 209* 210*   ALT 13 11 11   AST 16 13 11   BILITOT 0.4 0.5 0.4      Coagulation:    No results for input(s): PT, INR, APTT in the last 168 hours.  LDH:  No results for input(s): LDH in the last 72 hours.  Microbiology:  Microbiology Results (last 7 days)     Procedure Component Value Units Date/Time    Fungus culture [[id number]] Collected:  03/14/18 1137    Order Status:  Completed Specimen:  Bronchial Wash from Amniotic Fluid Updated:  04/18/18 1303     Fungus (Mycology) Culture No fungus isolated after 4 weeks          I have reviewed all pertinent labs within the past 24 hours.      Estimated Creatinine Clearance: 30.4 mL/min (A) (based on SCr of 2.9 mg/dL (H)).    Diagnostic Results:      Assessment and Plan:     43 yo male S/P OHTx 11/18/2014, suspected restrictive versus constrictive CMP post-transplant as well as CKD stage IV that has resulted in ascites/pleural effusion, was getting monthly paracentesis and thoracentesis. Most recently has had ongoing issues with his lungs, had gotten 2 thoracenteses, after which he underwent VATS 01/19/18 followed by pleurex catheter placement and effusion drainage 01/11/18, subsequently had it removed 02/07/18 after drainage had decreased despite multiple attempts to drain it. Has been having significant coughing fits that result in him being near-syncopal at times, although difficult to say if he has passed out or not- patient most recently was admitted to the hospital for increased AGUILAR, coughing and pre-syncope 02/11-02/14/18 at [facility].   Patient was started on antibiotics for suspected bacterial infection, with some diarrhea, bronchitis, and possible pneumonia. Ct chest showed some pleural fluid collection and after talking with Dr. James, we arranged for him to receive a diagnostic tap with IR, from which the fluid collection demonstrated no growth or significant findings at all really. Cell counts do not appear to have been sent but will recheck. Patient states since his hospital stay, yesterday had a significant coughing fit again, after which he  nearly passed out, is being seen in clinic today for this. Denies any cardiopulmonary complaints, no leg swelling, has some abdominal swelling, which is moderate for him. Denies significant shortness of breath with mild exertion, had 6MWT yesterday to see if he qualified for home O2, and his O2 sats actually improved after recovery from where he started initially. He and his wife admit he is in bed most days, not very active.     Mr. Crocker presented to the ED 3/11/18 with approximately 3 weeks of worsening diarrhea and 2-3 days of sinus pressure, drainage, and worsened cough.  He notes that he had a coughing fit last night and passed out, coughed throughout most of the night.  This also happened on 2/25/18.  He last saw Dr Ferreira in clinic on 2/20/18 where he was taken off myfortic and switched to cellcept (he hadn't been on cellcept because of leukopenia when they tried post-transplant).  Though his diarrhea had improved after his last discharge, he states it has increased now to 15x/day, clear/yellow/green, no fevers, no exacerbating foods per say.  He was taken back off the cellcept and placed back on myfortic this past week and has not noticed immediate change in diarrhea. He also reports his baseline coughing fits havent improved and are minimally responsive to tessalon pearls.  Came on last night, he lost consciousness during a fit once which is relatively normal for him, and had two more during HPI.  He notes no sick contacts but does state he's had some URI symptoms as above.  His baseline vitals are usually -120 and BP 90s/60s-110s/70s.  He reports a history of intermittent diarrhea - did have Cdiff many years ago but this smells different.  No abdominal pain, no nausea, no vomiting.  No blood in diarrhea.  Appears last c-scope in 2015 with no evidence of infection or inflammatory processes.  He does report IBS which is different that this.       * Acute renal failure with acute tubular necrosis  superimposed on stage 3 chronic kidney disease    - Appreciate Nephrology recs.   - Continue middorine for pressure support  - HD per nephrology.   - Had half liter removal two days ago. Possibly a session tomorrow        Gastroparesis    - unclear if  worsening gastroparesis or new partial obstruction  - patient having persistent GI discomfort/nausea on tube feed rate at 40/hr requiring intermittent d/c  - KUB to be done today.  CMV PCR pending  - Will consider Gi for gastroparesis/ CT abd for eval if persistent.   - contraindicated for Reglan due to transplant meds        Heart transplanted    - TTE 3/26/18 showed normal graft function but has known history of restrictive CM post transplant.  - Decrease pred , Tacro level high for the last two days 11 and 12.9 today. Evening dose held today. Will continue to adjust tomorrow.  goal 6-8/10  - Tacro- current dose is 2/1 daily  - Cellcept remains on hold due to leukopenia          Alteration in skin integrity    - wound care following        Acute respiratory failure with hypoxia    - Resolved.   - S/P Bronch and intubation 3/14 now post tracheostomy due to inability to extubate  - Pulmonology signed off. Completely weaned off vent.   - RSV positive early and completed course of Ribavarin/IVIG.  - ID had been following. Completed 7 day course of Meropenem/Linezolid earlier in hospitalization  - Appreciate PT/OT/Wound care.        Restrictive cardiomyopathy    - No changes from before.   - Has known history of recurrent ascites and pleural effusions   - S/P thoracentesis X 2, followed by VATS/pleurex cath placement (January 2018) with removal of pleurex (February 2018) and 3rd thoracentesis 2/14/18 with no significant findings on fluid removal.        Type 1 diabetes mellitus with stage 3 chronic kidney disease    - Appreciate Endocrine's recs  - Insulin gtt per endocrine recs. Adjust as needed        Hypothyroidism due to Hashimoto's thyroiditis    - Appreciate  Endocrine's recs  - Continue Levothyroxine 150mcg per NG tube (adjusted over the weekend)        Anemia    - Transfused 2 units of PRBCs on 4/11 during HD  - Nephrology has resumed ProCrit         Debility    - PT/OT/SLP daily.   - Tube feeds held last night due to residual >200. Restarted at mid-day today at 10cc/hrs with plan to advance 10cc/hr  - Nutrition following.   - switched from glucerna to novasource renal (start at 10 and increase to goal of 40 as tolerated)        Anxiety    - Continue PTA  Escitalopram, will stop xanex and nortriptyline due to hallucinations and give sleep med and less pain meds  - Increased Lexapro to 20mg daily per psych recs  - started Seroquel as per psych rec. Interaction with other agents cross check before starting          Discussed care with Dr. Marco Walsh MD  Heart Transplant  [facility]

## 2018-04-22 NOTE — SUBJECTIVE & OBJECTIVE
Interval History:    Tacrolimus level elevated this morning, 12.9 from 11 yesterday. Tube feeds held last night due to nausea abd pain and residual of >200. Restarted again this morning at 10mL per hour to increase hourly at 10mL. No HD session yesterday.    Continuous Infusions:   insulin (HUMAN R) infusion (adults) 0.7 Units/hr (04/22/18 0037)     Scheduled Meds:   sodium chloride 0.9%   Intravenous Once    acetaminophen  999.0148 mg Per G Tube TID    cetirizine  5 mg Per G Tube Daily    chlorhexidine  15 mL Mouth/Throat BID    epoetin jose miguel (PROCRIT) injection  4,000 Units Intravenous Every Mon, Wed, Fri    escitalopram oxalate  20 mg Per G Tube Daily    famotidine  20 mg Per G Tube QHS    heparin (porcine)  5,000 Units Subcutaneous Q12H    levothyroxine  75 mcg Intravenous Daily    midodrine  2.5 mg Oral TID    polyethylene glycol  17 g Oral Daily    predniSONE  5 mg Per G Tube Daily    QUEtiapine  50 mg Oral QHS    sennosides 8.8 mg/5 ml  5 mL Oral QHS    tacrolimus  1 mg Sublingual Daily    tacrolimus  2 mg Sublingual Daily     PRN Meds:sodium chloride 0.9%, sodium chloride 0.9%, heparin (porcine), insulin aspart U-100, midodrine, ondansetron, oxyCODONE, povidone-iodine, ramelteon    Review of patient's allergies indicates:   Allergen Reactions    No known drug allergies      Objective:     Vital Signs (Most Recent):  Temp: 97.7 °F (36.5 °C) (04/22/18 0834)  Pulse: 104 (04/22/18 0834)  Resp: 18 (04/22/18 0834)  BP: 96/64 (04/22/18 0834)  SpO2: 99 % (04/22/18 0834) Vital Signs (24h Range):  Temp:  [97.3 °F (36.3 °C)-97.8 °F (36.6 °C)] 97.7 °F (36.5 °C)  Pulse:  [101-108] 104  Resp:  [15-18] 18  SpO2:  [94 %-99 %] 99 %  BP: ()/(57-65) 96/64     Patient Vitals for the past 72 hrs (Last 3 readings):   Weight   04/20/18 1100 74.8 kg (164 lb 14.5 oz)   04/20/18 0600 74.8 kg (164 lb 14.5 oz)     Body mass index is 25.83 kg/m².      Intake/Output Summary (Last 24 hours) at 04/22/18 0951  Last  data filed at 04/22/18 0900   Gross per 24 hour   Intake           182.51 ml   Output                0 ml   Net           182.51 ml       Hemodynamic Parameters:       Telemetry:    Physical Exam     Constitutional: He is oriented to person, place, and time.   Generalized weakness but normal ROM   Neck: No JVD present. Trache in place and well  secured  Cardiovascular: Normal rate, regular rhythm and normal heart sounds.    Pulmonary/Chest: Effort normal and breath sounds normal.   Abdominal: Soft. Bowel sounds are normal.   Musculoskeletal: Normal range of motion. He exhibits no edema or tenderness.   Neurological: He is alert and oriented to person, place, and time.   Skin: Skin is warm and dry.   Psychiatric: - able to interact and ask questions this morning  Nursing note and vitals reviewed.    Significant Labs:  CBC:    Recent Labs  Lab 04/20/18 0358 04/21/18  0334 04/22/18  0532   WBC 3.30* 3.46* 2.69*   RBC 2.71* 2.66* 2.60*   HGB 8.4* 8.3* 8.2*   HCT 27.6* 27.2* 26.6*    215 185   * 102* 102*   MCH 31.0 31.2* 31.5*   MCHC 30.4* 30.5* 30.8*     BNP:  No results for input(s): BNP in the last 168 hours.    Invalid input(s): BNPTRIAGELBLO  CMP:    Recent Labs  Lab 04/20/18 0358 04/21/18  0334 04/22/18  0532   * 216* 173*   CALCIUM 9.8 9.2 10.6*   ALBUMIN 2.1* 2.1* 2.1*   PROT 6.4 6.2 6.2   * 133* 136   K 5.6* 3.8 4.5   CO2 23 22* 22*   CL 99 101 101   BUN 41* 25* 38*   CREATININE 2.8* 1.8* 2.9*   ALKPHOS 208* 209* 210*   ALT 13 11 11   AST 16 13 11   BILITOT 0.4 0.5 0.4      Coagulation:   No results for input(s): PT, INR, APTT in the last 168 hours.  LDH:  No results for input(s): LDH in the last 72 hours.  Microbiology:  Microbiology Results (last 7 days)     Procedure Component Value Units Date/Time    Fungus culture [[id number]] Collected:  03/14/18 1137    Order Status:  Completed Specimen:  Bronchial Wash from Amniotic Fluid Updated:  04/18/18 1303     Fungus (Mycology) Culture  No fungus isolated after 4 weeks          I have reviewed all pertinent labs within the past 24 hours.      Estimated Creatinine Clearance: 30.4 mL/min (A) (based on SCr of 2.9 mg/dL (H)).    Diagnostic Results:

## 2018-04-22 NOTE — ASSESSMENT & PLAN NOTE
- Appreciate Nephrology recs.   - Continue middorine for pressure support  - HD per nephrology.   - Had half liter removal two days ago. Possibly a session tomorrow

## 2018-04-23 NOTE — ASSESSMENT & PLAN NOTE
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  Mr. Crocker presented to the ED 3/11/18 with approximately 3 weeks of worsening diarrhea and 2-3 days of sinus pressure, drainage, and worsened cough.    -No need for PEC or inpatient psychiatric hospitalization at this time as he is not a danger to himself or others and is not gravely disabled.   -Recommend increasing Lexapro back to 20 mg PO daily  -Recommend starting Seroquel 50 mg PO daily as needed for anxiety  -Psychiatry will sign off today; please call if needed

## 2018-04-23 NOTE — PLAN OF CARE
Problem: SLP Goal  Goal: SLP Goal  Speech Language Pathology Goals  Goals expected to be met by 4/20/18  1.  Pt will tolerate PMSV for 30 minutes w/ no change in respiratory status and fair voicing produced.  2.  Pt will participate in ongoing swallow assessment   3. Pt will name up to 12 items/minute in a concrete category, 90% of attempts, Supervision, to improve cognitive organization  4. Pt will recall 3 related items post 3 minute filled delay, 90% of attempts, Supervision, to improve STM   5. Pt will answer m/u YNQ with 90% accuracy, Supervision, to improve higher-level comprehension   6. Pt will complete further assessment of reading/writing skills  7. Educate Pt on aspiration precautions and S/S aspiration  8. Educate Pt and family on PMSV precautions and safety awareness     Goals expected to be met by 4/20/18  1.  Pt will tolerate PMSV for 30 minutes w/ no change in respiratory status and fair voicing produced.  2.  Pt will complete further evaluation of cognitive-linguistic communication skills to determine the need for additional goals. Met 4/19  3. Pt will name up to 12 items/minute in a concrete category, 90% of attempts, Supervision, to improve cognitive organization  4. Pt will recall 3 related items post 3 minute filled delay, 90% of attempts, Supervision, to improve STM   5. Pt will answer m/u YNQ with 90% accuracy, Supervision, to improve higher-level comprehension   6. Pt will complete further assessment of reading/writing skills  7. Pending MD clearance, Pt will complete further assessment of swallow fx   8. Educate Pt and family on PMSV precautions and safety awareness      Goals expected to be met by 4/13/18     1.  Pt will tolerate PMSV for 3 min w/ no change in respiratory status and fair voicing produced. Met   2.  Pt will complete further evaluation of cognitive-linguistic communication skills to determine the need for additional goals.         Outcome: Ongoing (interventions implemented  as appropriate)  Pt with improved vocal quality while donning PMSV this service day. Pt reports only wearing intermittently, encouraged to don valve when awake and alert, with supervision. Bedside Swallow Study initiated. REC: Continue NPO at this time with alternative means nutrition/hydration/medicaiton, frequent oral care, and ongoing dysphagia therapy. Patient might benefit from further assessment via MBSS when feasible to determine safest, least restrictive means of nutrition/hydration/medication. ST to continue to follow. Thank you.     JOSELYN Delarosa., [facility]-SLP  Speech-Language Pathology  Pager: [phone]  4/23/2018

## 2018-04-23 NOTE — SUBJECTIVE & OBJECTIVE
"Interval History: The patient states that his mood is "good" today. He felt "okay" over the weekend. He denies any SI/HI/AVH today. He denies any side effects after Lexapro dose was increased back to 20 mg PO daily last week. He denies significant depression or anxiety and states that sleep has improved.    Family History     Problem Relation (Age of Onset)    Cancer Brother (50)    Depression Mother    Diabetes Mother, Father, Brother, Son, Sister, Maternal Uncle, Paternal Aunt, Maternal Grandmother, Maternal Grandfather    Heart disease Mother, Brother    Hypertension Mother, Father, Brother    Kidney disease Mother, Father    Stroke Father, Brother    Vision loss Brother        Social History Main Topics    Smoking status: Never Smoker    Smokeless tobacco: Never Used    Alcohol use No    Drug use: No    Sexual activity: Yes     Partners: Female     Psychotherapeutics     Start     Stop Route Frequency Ordered    04/21/18 2100  QUEtiapine tablet 50 mg      -- Oral Nightly 04/21/18 0743    04/21/18 0900  escitalopram oxalate tablet 20 mg      -- PER G TUBE Daily 04/21/18 0743    04/20/18 2146  ramelteon tablet 8 mg      -- Oral Nightly PRN 04/20/18 2047           Review of Systems   Respiratory: Negative for cough.    Gastrointestinal: Negative for vomiting.   Psychiatric/Behavioral: Negative for agitation.     Objective:     Vital Signs (Most Recent):  Temp: 98 °F (36.7 °C) (04/23/18 1245)  Pulse: (!) 112 (04/23/18 1415)  Resp: 17 (04/23/18 1245)  BP: 114/65 (04/23/18 1415)  SpO2: 97 % (04/23/18 1245) Vital Signs (24h Range):  Temp:  [97 °F (36.1 °C)-98.1 °F (36.7 °C)] 98 °F (36.7 °C)  Pulse:  [101-112] 112  Resp:  [15-18] 17  SpO2:  [94 %-98 %] 97 %  BP: (108-132)/(60-74) 114/65     Height: 5' 7" (170.2 cm)  Weight: 74.8 kg (164 lb 14.5 oz)  Body mass index is 25.83 kg/m².      Intake/Output Summary (Last 24 hours) at 04/23/18 1428  Last data filed at 04/23/18 0800   Gross per 24 hour   Intake             " 94.8 ml   Output                0 ml   Net             94.8 ml       Physical Exam      Mental Status Exam:  Appearance: unremarkable, age appropriate, well nourished, lying in bed, overweight, dressed in hopsital gown  Behavior/Cooperation: normal, cooperative  Speech: normal tone, normal rate, normal pitch, normal volume  Language: uses words appropriately; NO aphasia or dysarthria  Mood: euthymic  Affect:  congruent with mood and appropriate to situation/content   Thought Process: normal and logical  Thought Content: normal, no suicidality, no homicidality, delusions, or paranoia  Level of Consciousness: Alert and Oriented x3  Memory:  Intact    Recent: Intact; able to report recent events   Remote: Intact; Named 4/4 past presidents   Attention/concentration: appropriate for age/education.   Fund of Knowledge: appears adequate  Insight:  Intact  Judgment: Appropriate for setting      Significant Labs:   Last 24 Hours:   Recent Lab Results       04/23/18  1201 04/23/18  0740 04/23/18  0615 04/23/18  0319 04/22/18  2359      Immature Granulocytes   1.5(H)       Immature Grans (Abs)   0.04  Comment:  Mild elevation in immature granulocytes is non specific and   can be seen in a variety of conditions including stress response,   acute inflammation, trauma and pregnancy. Correlation with other   laboratory and clinical findings is essential.         Albumin   2.1(L)       Alkaline Phosphatase   199(H)       ALT   10       Anion Gap   17(H)       AST   13       Baso #   0.01       Basophil%   0.4       Total Bilirubin   0.3  Comment:  For infants and newborns, interpretation of results should be based  on gestational age, weight and in agreement with clinical  observations.  Premature Infant recommended reference ranges:  Up to 24 hours.............<8.0 mg/dL  Up to 48 hours............<12.0 mg/dL  3-5 days..................<15.0 mg/dL  6-29 days.................<15.0 mg/dL         BUN, Bld   50(H)       Calcium    10.6(H)       Chloride   99       CO2   18(L)       Creatinine   3.9(H)       Differential Method   Automated       eGFR if    20.3(A)       eGFR if non    17.6  Comment:  Calculation used to obtain the estimated glomerular filtration  rate (eGFR) is the CKD-EPI equation.   (A)       Eos #   0.0       Eosinophil%   0.0       Glucose   221(H)       Gran # (ANC)   1.8       Gran%   65.2       Hematocrit   25.7(L)       Hemoglobin   8.0(L)       Lymph #   0.7(L)       Lymph%   24.4       Magnesium   2.2       MCH   31.1(H)       MCHC   31.1(L)       MCV   100(H)       Mono #   0.2(L)       Mono%   8.5       MPV   9.4       nRBC   0       Phosphorus   5.0(H)       Platelets   193       POCT Glucose 232(H) 236(H)  205(H) 184(H)     Potassium   4.6       Total Protein   6.2       RBC   2.57(L)       RDW   20.9(H)       Sodium   134(L)       Tacrolimus Lvl   7.5  Comment:  Testing performed by Liquid Chromatography-Tandem  Mass Spectrometry (LC-MS/MS).  This test was developed and its performance characteristics  determined by [facility], Department of Pathology  and Laboratory Medicine in a manner consistent with CLIA  requirements. It has not been cleared or approved by the [place]  Food and Drug Administration.  This test is used for clinical   purposes.  It should not be regarded as investigational or for  research.         WBC   2.71(L)                   04/22/18  2045 04/22/18  1627      Immature Granulocytes       Immature Grans (Abs)       Albumin       Alkaline Phosphatase       ALT       Anion Gap       AST       Baso #       Basophil%       Total Bilirubin       BUN, Bld       Calcium       Chloride       CO2       Creatinine       Differential Method       eGFR if        eGFR if non        Eos #       Eosinophil%       Glucose       Gran # (ANC)       Gran%       Hematocrit       Hemoglobin       Lymph #       Lymph%       Magnesium        MCH       MCHC       MCV       Mono #       Mono%       MPV       nRBC       Phosphorus       Platelets       POCT Glucose 186(H) 190(H)     Potassium       Total Protein       RBC       RDW       Sodium       Tacrolimus Lvl       WBC             Significant Imaging: None

## 2018-04-23 NOTE — PROGRESS NOTES
[facility]  Psychiatry  Progress Note    Patient Name: Lv Crocker  MRN: [id number]   Code Status: Full Code  Admission Date: 3/11/2018  Hospital Length of Stay: 42 days  Expected Discharge Date: 5/10/2018  Attending Physician: Behzad Lara Jr.,*  Primary Care Provider: Philippe Mohr MD    Current Legal Status: N/A    Patient information was obtained from patient and spouse/SO.     Subjective:     Principal Problem:Acute renal failure with acute tubular necrosis superimposed on stage 3 chronic kidney disease    Chief Complaint: Anxiety, depression and hallucinations    HPI: Per primary team H&P:  43 yo gentleman with hashimoto's thyroidits, DM1 on insulin, s/p OHTX 11/14 complicated by AMR, treated 04/15 with 5 days PP, IVIG x 2 doses, was scheduled for Rituximab on 5/1/15 but developed pancytopenia in April 2015 after which he got CMV and c. Dif at the same time.  Angiogram 2/16 with IVUS showed no evidence of CAV. 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 He was getting monthly paracentesis and thoracentesis until he underwent a VATS with pleurX catheter placement on 1/11/2018. He had been draining 700-800cc of fluid every 2 days until about 3 weeks ago, where the drainage had decreased.  Had seen Dr. James in clinic in early feb and it was decided to remove the catheter. Since the removal, he'd reported not feeling well with low grade temps ~100.2.  He was admitted with a suspicion for bacterial infection the workup for which was negative.  Discharged 2/16/18, seen in clinic 2/20/18 with Dr Ferreira.     Mr. Crocker presents with approximately 3 weeks of worsening diarrhea and 2-3 days of sinus pressure, drainage, and worsened cough.  He notes that he had a coughing fit last night and passed out, coughed throughout most of the night.  This also happened on 2/25/18.  He  "last saw Dr Ferreira in clinic on 2/20/18 where he was taken off myfortic and switched to cellcept (he hadn't been on cellcept because of leukopenia when they tried post-transplant).  Though his diarrhea had improved after his last discharge, he states it has increased now to 15x/day, clear/yellow/green, no fevers, no exacerbating foods per say.  He was taken back off the cellcept and placed back on myfortic this past week and has not noticed immediate change in diarrhea. He also reports his baseline coughing fits havent improved and are minimally responsive to tessalon pearls.  Came on last night, he lost consciousness during a fit once which is relatively normal for him, and had two more during HPI.  He notes no sick contacts but does state he's had some URI symptoms as above.  His baseline vitals are usually -120 and BP 90s/60s-110s/70s.  He reports a history of intermittent diarrhea - did have Cdiff many years ago but this smells different.  No abdominal pain, no nausea, no vomiting.  No blood in diarrhea.  Appears last c-scope in 2015 with no evidence of infection or inflammatory processes.  He does report IBS which is different that this. Psychiatry was consulted on 4/20/2018 to address depression, anxiety and hallucinations.    The patient states that he is treated for depression and anxiety as an outpatient. He has been in the hospital since March. He was originally admitted for GI issues, but later developed a lung infection. He states that his anxiety and depression have actually improved over the past two days. His mood is "alright" today. He denies panic attacks, history of manic/hypomanic episodes or a history of paranoia or delusions. Of note, he did have some significant confusion two days ago with hallucinations of "bugs crawling on the wall". This confusion has since resolved and the patient is oriented to person, place, time and situation today. He took Ramelteon as needed for insomnia two " "nights ago and states that it made him feel calm and get better sleep.He states that Lexapro is helpful for depression and anxiety. His primary team decreased his dose to 10 mg PO daily of Lexapro rather than his home dose of 20 mg daily during this hospitalization. He denies SI/HI/AVH today.        Hospital Course: 4/23/2018 - Lexapro dose was increased back to 20 mg PO daily on 4/20/2018. Patient denies any side effects from this medication. He states that his sleep has improved. He denies significant anxiety or depression. His mood is "good" today. Patient denies SI/HI/AVH.     Interval History: The patient states that his mood is "good" today. He felt "okay" over the weekend. He denies any SI/HI/AVH today. He denies any side effects after Lexapro dose was increased back to 20 mg PO daily last week. He denies significant depression or anxiety and states that sleep has improved.    Family History     Problem Relation (Age of Onset)    Cancer Brother (50)    Depression Mother    Diabetes Mother, Father, Brother, Son, Sister, Maternal Uncle, Paternal Aunt, Maternal Grandmother, Maternal Grandfather    Heart disease Mother, Brother    Hypertension Mother, Father, Brother    Kidney disease Mother, Father    Stroke Father, Brother    Vision loss Brother        Social History Main Topics    Smoking status: Never Smoker    Smokeless tobacco: Never Used    Alcohol use No    Drug use: No    Sexual activity: Yes     Partners: Female     Psychotherapeutics     Start     Stop Route Frequency Ordered    04/21/18 2100  QUEtiapine tablet 50 mg      -- Oral Nightly 04/21/18 0743    04/21/18 0900  escitalopram oxalate tablet 20 mg      -- PER G TUBE Daily 04/21/18 0743    04/20/18 2146  ramelteon tablet 8 mg      -- Oral Nightly PRN 04/20/18 2047           Review of Systems   Respiratory: Negative for cough.    Gastrointestinal: Negative for vomiting.   Psychiatric/Behavioral: Negative for agitation.     Objective:     Vital " "Signs (Most Recent):  Temp: 98 °F (36.7 °C) (04/23/18 1245)  Pulse: (!) 112 (04/23/18 1415)  Resp: 17 (04/23/18 1245)  BP: 114/65 (04/23/18 1415)  SpO2: 97 % (04/23/18 1245) Vital Signs (24h Range):  Temp:  [97 °F (36.1 °C)-98.1 °F (36.7 °C)] 98 °F (36.7 °C)  Pulse:  [101-112] 112  Resp:  [15-18] 17  SpO2:  [94 %-98 %] 97 %  BP: (108-132)/(60-74) 114/65     Height: 5' 7" (170.2 cm)  Weight: 74.8 kg (164 lb 14.5 oz)  Body mass index is 25.83 kg/m².      Intake/Output Summary (Last 24 hours) at 04/23/18 1428  Last data filed at 04/23/18 0800   Gross per 24 hour   Intake             94.8 ml   Output                0 ml   Net             94.8 ml       Physical Exam      Mental Status Exam:  Appearance: unremarkable, age appropriate, well nourished, lying in bed, overweight, dressed in hopsital gown  Behavior/Cooperation: normal, cooperative  Speech: normal tone, normal rate, normal pitch, normal volume  Language: uses words appropriately; NO aphasia or dysarthria  Mood: euthymic  Affect:  congruent with mood and appropriate to situation/content   Thought Process: normal and logical  Thought Content: normal, no suicidality, no homicidality, delusions, or paranoia  Level of Consciousness: Alert and Oriented x3  Memory:  Intact    Recent: Intact; able to report recent events   Remote: Intact; Named 4/4 past presidents   Attention/concentration: appropriate for age/education.   Fund of Knowledge: appears adequate  Insight:  Intact  Judgment: Appropriate for setting      Significant Labs:   Last 24 Hours:   Recent Lab Results       04/23/18  1201 04/23/18  0740 04/23/18  0615 04/23/18  0319 04/22/18  2359      Immature Granulocytes   1.5(H)       Immature Grans (Abs)   0.04  Comment:  Mild elevation in immature granulocytes is non specific and   can be seen in a variety of conditions including stress response,   acute inflammation, trauma and pregnancy. Correlation with other   laboratory and clinical findings is " essential.         Albumin   2.1(L)       Alkaline Phosphatase   199(H)       ALT   10       Anion Gap   17(H)       AST   13       Baso #   0.01       Basophil%   0.4       Total Bilirubin   0.3  Comment:  For infants and newborns, interpretation of results should be based  on gestational age, weight and in agreement with clinical  observations.  Premature Infant recommended reference ranges:  Up to 24 hours.............<8.0 mg/dL  Up to 48 hours............<12.0 mg/dL  3-5 days..................<15.0 mg/dL  6-29 days.................<15.0 mg/dL         BUN, Bld   50(H)       Calcium   10.6(H)       Chloride   99       CO2   18(L)       Creatinine   3.9(H)       Differential Method   Automated       eGFR if    20.3(A)       eGFR if non    17.6  Comment:  Calculation used to obtain the estimated glomerular filtration  rate (eGFR) is the CKD-EPI equation.   (A)       Eos #   0.0       Eosinophil%   0.0       Glucose   221(H)       Gran # (ANC)   1.8       Gran%   65.2       Hematocrit   25.7(L)       Hemoglobin   8.0(L)       Lymph #   0.7(L)       Lymph%   24.4       Magnesium   2.2       MCH   31.1(H)       MCHC   31.1(L)       MCV   100(H)       Mono #   0.2(L)       Mono%   8.5       MPV   9.4       nRBC   0       Phosphorus   5.0(H)       Platelets   193       POCT Glucose 232(H) 236(H)  205(H) 184(H)     Potassium   4.6       Total Protein   6.2       RBC   2.57(L)       RDW   20.9(H)       Sodium   134(L)       Tacrolimus Lvl   7.5  Comment:  Testing performed by Liquid Chromatography-Tandem  Mass Spectrometry (LC-MS/MS).  This test was developed and its performance characteristics  determined by [facility], Department of Pathology  and Laboratory Medicine in a manner consistent with CLIA  requirements. It has not been cleared or approved by the [place]  Food and Drug Administration.  This test is used for clinical   purposes.  It should not be regarded as investigational  or for  research.         WBC   2.71(L)                   04/22/18  2045 04/22/18  1627      Immature Granulocytes       Immature Grans (Abs)       Albumin       Alkaline Phosphatase       ALT       Anion Gap       AST       Baso #       Basophil%       Total Bilirubin       BUN, Bld       Calcium       Chloride       CO2       Creatinine       Differential Method       eGFR if        eGFR if non        Eos #       Eosinophil%       Glucose       Gran # (ANC)       Gran%       Hematocrit       Hemoglobin       Lymph #       Lymph%       Magnesium       MCH       MCHC       MCV       Mono #       Mono%       MPV       nRBC       Phosphorus       Platelets       POCT Glucose 186(H) 190(H)     Potassium       Total Protein       RBC       RDW       Sodium       Tacrolimus Lvl       WBC             Significant Imaging: None    Assessment/Plan:     Anxiety    43 yo male S/P OHTx 11/18/2014, suspected restrictive versus constrictive CMP post-transplant as well as CKD stage IV that has resulted in ascites/pleural effusion, was getting monthly paracentesis and thoracentesis.  Mr. Crocker presented to the ED 3/11/18 with approximately 3 weeks of worsening diarrhea and 2-3 days of sinus pressure, drainage, and worsened cough.    -No need for PEC or inpatient psychiatric hospitalization at this time as he is not a danger to himself or others and is not gravely disabled.   -Recommend increasing Lexapro back to 20 mg PO daily  -Recommend starting Seroquel 50 mg PO daily as needed for anxiety  -Psychiatry will sign off today; please call if needed             Need for Continued Hospitalization:   No need for inpatient psychiatric hospitalization. Continue medical care as per the primary team.    Anticipated Disposition: Home or Self Care     Total time:  25 with greater than 50% of this time spent in counseling and/or coordination of care.       Tez Francsi MD   Psychiatry  [facility]

## 2018-04-23 NOTE — ASSESSMENT & PLAN NOTE
- TTE 3/26/18 showed normal graft function but has known history of restrictive CM post transplant.   - Repeat echo today pending.  - Continue pred 5  - Tacro- current dose is 1/1 daily, adjust as needed for goal 6-10  - Cellcept remains on hold due to leukopenia

## 2018-04-23 NOTE — SUBJECTIVE & OBJECTIVE
Interval History: No complaints overnight. Slept well, no more AMS or hallucinations per patient and wife. Abdominal discomfort mild but improved from the weekend. Passing gas, last BM 4/21.    Continuous Infusions:   insulin (HUMAN R) infusion (adults) 0.7 Units/hr (04/22/18 1756)     Scheduled Meds:   sodium chloride 0.9%   Intravenous Once    sodium chloride 0.9%   Intravenous Once    acetaminophen  999.0148 mg Per G Tube TID    cetirizine  5 mg Per G Tube Daily    chlorhexidine  15 mL Mouth/Throat BID    epoetin jose miguel (PROCRIT) injection  4,000 Units Intravenous Every Mon, Wed, Fri    escitalopram oxalate  20 mg Per G Tube Daily    famotidine  20 mg Per G Tube QHS    heparin (porcine)  5,000 Units Subcutaneous Q12H    levothyroxine  75 mcg Intravenous Daily    midodrine  2.5 mg Oral TID    polyethylene glycol  17 g Oral Daily    predniSONE  5 mg Per G Tube Daily    QUEtiapine  50 mg Oral QHS    sennosides 8.8 mg/5 ml  5 mL Oral QHS    tacrolimus  1 mg Sublingual BID     PRN Meds:sodium chloride 0.9%, sodium chloride 0.9%, sodium chloride 0.9%, heparin (porcine), insulin aspart U-100, midodrine, ondansetron, oxyCODONE, povidone-iodine, ramelteon    Review of patient's allergies indicates:   Allergen Reactions    No known drug allergies      Objective:     Vital Signs (Most Recent):  Temp: 97.6 °F (36.4 °C) (04/23/18 0734)  Pulse: 102 (04/23/18 0740)  Resp: 18 (04/23/18 0734)  BP: 108/71 (04/23/18 0734)  SpO2: 95 % (04/23/18 0734) Vital Signs (24h Range):  Temp:  [97 °F (36.1 °C)-98.1 °F (36.7 °C)] 97.6 °F (36.4 °C)  Pulse:  [101-111] 102  Resp:  [15-18] 18  SpO2:  [94 %-100 %] 95 %  BP: ()/(65-74) 108/71     Patient Vitals for the past 72 hrs (Last 3 readings):   Weight   04/20/18 1100 74.8 kg (164 lb 14.5 oz)     Body mass index is 25.83 kg/m².      Intake/Output Summary (Last 24 hours) at 04/23/18 1052  Last data filed at 04/23/18 0317   Gross per 24 hour   Intake            134.8 ml    Output                0 ml   Net            134.8 ml       Hemodynamic Parameters:       Physical Exam   Constitutional: He is oriented to person, place, and time.   Generalized weakness but normal ROM   Neck: No JVD present. Trache in place and well  secured  Cardiovascular: Normal rate, regular rhythm and normal heart sounds.    Pulmonary/Chest: Effort normal and breath sounds normal.   Abdominal: Soft. Bowel sounds are normal. Mild diffuse TTP with deep palpation  Musculoskeletal: Normal range of motion. He exhibits no edema or tenderness.   Neurological: He is alert and oriented to person, place, and time.   Skin: Skin is warm and dry.   Psychiatric: - able to interact and ask questions this morning  Nursing note and vitals reviewed.    Significant Labs:  CBC:    Recent Labs  Lab 04/21/18  0334 04/22/18  0532 04/23/18  0615   WBC 3.46* 2.69* 2.71*   RBC 2.66* 2.60* 2.57*   HGB 8.3* 8.2* 8.0*   HCT 27.2* 26.6* 25.7*    185 193   * 102* 100*   MCH 31.2* 31.5* 31.1*   MCHC 30.5* 30.8* 31.1*     BNP:  No results for input(s): BNP in the last 168 hours.    Invalid input(s): BNPTRIAGELBLO  CMP:    Recent Labs  Lab 04/21/18  0334 04/22/18  0532 04/23/18  0615   * 173* 221*   CALCIUM 9.2 10.6* 10.6*   ALBUMIN 2.1* 2.1* 2.1*   PROT 6.2 6.2 6.2   * 136 134*   K 3.8 4.5 4.6   CO2 22* 22* 18*    101 99   BUN 25* 38* 50*   CREATININE 1.8* 2.9* 3.9*   ALKPHOS 209* 210* 199*   ALT 11 11 10   AST 13 11 13   BILITOT 0.5 0.4 0.3      Coagulation:   No results for input(s): PT, INR, APTT in the last 168 hours.  LDH:  No results for input(s): LDH in the last 72 hours.  Microbiology:  Microbiology Results (last 7 days)     Procedure Component Value Units Date/Time    Fungus culture [[id number]] Collected:  03/14/18 1137    Order Status:  Completed Specimen:  Bronchial Wash from Amniotic Fluid Updated:  04/18/18 1303     Fungus (Mycology) Culture No fungus isolated after 4 weeks          I have  reviewed all pertinent labs within the past 24 hours.      Estimated Creatinine Clearance: 22.6 mL/min (A) (based on SCr of 3.9 mg/dL (H)).    Diagnostic Results:

## 2018-04-23 NOTE — PLAN OF CARE
Problem: Diabetes, Type 1 (Adult)  Goal: Signs and Symptoms of Listed Potential Problems Will be Absent, Minimized or Managed (Diabetes, Type 1)  Signs and symptoms of listed potential problems will be absent, minimized or managed by discharge/transition of care (reference Diabetes, Type 1 (Adult) CPG).   Outcome: Ongoing (interventions implemented as appropriate)  -Insulin gtt @ 0.7u/hr. 0000 . SSI admin per order.   -CBG checks q4h.     Problem: Fall Risk (Adult)  Goal: Identify Related Risk Factors and Signs and Symptoms  Related risk factors and signs and symptoms are identified upon initiation of Human Response Clinical Practice Guideline (CPG)   Outcome: Ongoing (interventions implemented as appropriate)  - 2-3 person assist. Wife @ bedside attentive to pt needs.   -Pt free from falls/injuries thus far this shift.   -Bed in low, locked position. Bed rails up x3. Call light within reach.    Problem: Patient Care Overview  Goal: Plan of Care Review  Outcome: Ongoing (interventions implemented as appropriate)  -AAOx4  -Trach # 6 shiley on RA O2 sats > 92%.   -Deep tissue injuries to buttocks and bilateral heels. Pt refused to turn q2h to prevent further injury. Barrier cream placed on buttocks.   -Gangrenous toes on the right foot. Podiatry consulted and wanted to amputate but HTS decided to wait.   -G-tube with TF Novasource Renal @ 40mL/hr. Residuals checked and were 40mL. Pt denies abd pain/fullness and nausea. Previous shifts unable to tolerate TF and they were held. KUB negative. Possible gastroparesis. If symptoms do not improve a CT of the abd will be performed.   -Plan for CRRT Monday or HD.   -Tele ST -108  -No other complaints overnight.   -Will continue to monitor

## 2018-04-23 NOTE — SUBJECTIVE & OBJECTIVE
Interval History 4/23/2018:  Patient is seen for follow-up rehab evaluation and recommendations: No acute events over night.  Sleeping better.  Participating with therapy.    HPI, Past Medical, Family, and Social History remains the same as documented in the initial encounter.    Scheduled Medications:    sodium chloride 0.9%   Intravenous Once    sodium chloride 0.9%   Intravenous Once    acetaminophen  999.0148 mg Per G Tube TID    cetirizine  5 mg Per G Tube Daily    chlorhexidine  15 mL Mouth/Throat BID    epoetin jose miguel (PROCRIT) injection  4,000 Units Intravenous Every Mon, Wed, Fri    escitalopram oxalate  20 mg Per G Tube Daily    famotidine  20 mg Per G Tube QHS    heparin (porcine)  5,000 Units Subcutaneous Q12H    levothyroxine  75 mcg Intravenous Daily    midodrine  2.5 mg Oral TID    polyethylene glycol  17 g Oral Daily    predniSONE  5 mg Per G Tube Daily    QUEtiapine  50 mg Oral QHS    sennosides 8.8 mg/5 ml  5 mL Oral QHS    tacrolimus  1 mg Sublingual BID     PRN Medications: sodium chloride 0.9%, sodium chloride 0.9%, sodium chloride 0.9%, heparin (porcine), insulin aspart U-100, midodrine, ondansetron, oxyCODONE, povidone-iodine, ramelteon    Review of Systems   Constitutional: Positive for activity change. Negative for chills, fatigue and fever.   HENT: Negative for drooling, hearing loss, trouble swallowing and voice change.    Eyes: Negative for pain and visual disturbance.   Respiratory: Negative for cough and wheezing.         + trach   Cardiovascular: Negative for chest pain and palpitations.   Gastrointestinal: Negative for abdominal pain, nausea and vomiting.        + PEG   Genitourinary: Negative for difficulty urinating and flank pain.   Musculoskeletal: Positive for gait problem and myalgias. Negative for arthralgias, back pain and neck pain.   Skin: Positive for color change and wound. Negative for rash.   Allergic/Immunologic: Positive for immunocompromised state.    Neurological: Positive for weakness. Negative for dizziness, numbness and headaches.   Psychiatric/Behavioral: Positive for hallucinations (improving) and sleep disturbance (improving). Negative for agitation. The patient is not nervous/anxious.      Objective:     Vital Signs (Most Recent):  Temp: 98 °F (36.7 °C) (18 1245)  Pulse: 104 (18 1257)  Resp: 17 (18 1245)  BP: 119/72 (18 1257)  SpO2: 97 % (18 1245)    Vital Signs (24h Range):  Temp:  [97 °F (36.1 °C)-98.1 °F (36.7 °C)] 98 °F (36.7 °C)  Pulse:  [101-108] 104  Resp:  [15-18] 17  SpO2:  [94 %-98 %] 97 %  BP: (108-132)/(64-74) 119/72     Physical Exam   Constitutional: He is oriented to person, place, and time. He appears well-developed. He appears ill. No distress.   HENT:   Head: Normocephalic and atraumatic.   Right Ear: External ear normal.   Left Ear: External ear normal.   Nose: Nose normal.   Eyes: Right eye exhibits no discharge. Left eye exhibits no discharge. No scleral icterus.   Neck: Neck supple.   Trach intact.    Cardiovascular: Normal rate, regular rhythm and intact distal pulses.    Pulmonary/Chest: Effort normal. No respiratory distress. He has no wheezes.   Abdominal: Soft. He exhibits no distension. There is no tenderness.   PEG intact, incision LETICIA, CDI   Musculoskeletal: He exhibits no edema or tenderness.   R foot: R great toe and R 3rd toe with necrotic tissue  L foot: small amount of necrotic tissue to L great toe  Overall deconditioning   Neurological: He is alert and oriented to person, place, and time.   -  Mental Status:  AAOx3.  Follows commands.  Answers correct age and .  Recent and remote memory intact.  -  Speech and language:  no aphasia or dysarthria.    -  Motor:  RUE: 2/5, 3/5 .  LUE: 2+/5, 3/5 .  RLE: 2/5, DF 3+/5, PF 3+/5.  LLE: 2/5, DF 2/5, PF 3+/5.  -  Sensory:  Intact to light touch and pin prick.   Skin: Skin is warm and dry. No rash noted.   Psychiatric: His behavior is  normal. Thought content normal. His affect is blunt.   Vitals reviewed.    Diagnostic Results:   Labs: Reviewed  X-Ray: Reviewed  US: Reviewed  CT: Reviewed    NEUROLOGICAL EXAMINATION:     MENTAL STATUS   Oriented to person, place, and time.

## 2018-04-23 NOTE — PROGRESS NOTES
[facility]  Nephrology  Progress Note    Patient Name: Lv Crocker  MRN: [id number]  Admission Date: 3/11/2018  Hospital Length of Stay: 42 days  Attending Provider: Behzad Lara Jr.,*   Primary Care Physician: Philippe Mohr MD  Principal Problem:Acute renal failure with acute tubular necrosis superimposed on stage 3 chronic kidney disease    Subjective:     HPI: Mr. Crocker is a 41 yo WM with T1DM, Hashimoto thyroiditis, h/o OHTx 11/2014, and CKD stage 4 who was admitted on 3/11/18 for persistent diarrhea. He reported a 3 week history of diarrhea up to 15x/day. Associated symptoms including URI symptoms, coughing fits, and coughing syncope. Prograf level was 14.3. His post-heart transplant course has been complicated by AMR 4/2015, CMV, post-transplant restrictive cardiomyopathy, recurrent pleural effusions/ascites requiring monthly thoracenteses/paracenteses, VATS 1/11/18 with Pleur-X catheter (now removed), and CKD stage 4 with baseline sCr 2.6-3.0. Baseline -120s and baseline BP 90s-110s/60-70s. Upon admission he was started on IVF and diarrhea workup was initiated. On 3/12 he was noted to have decreased UOP despite fluids; NS was increased to 100cc/hr. He was scheduled for a colonoscopy on 3/13 however procedure was cancelled due to hypoxia and fever. He was transferred to the ICU for closer monitoring. He continued to have oliguria overnight. Bladder scan revealed 200cc of urine but patient refused osullivan catheter placement. Wife reports that patient always has a hard time urinating again after osullivan catheters are placed/removed so he refuses them. He remained hypoxic despite FiO2 70% and ABG revealed combined respiratory and metabolic acidosis with pH 7.17. He was started on BiPAP as well as a bicarb infusion at 50cc/hr. ABG with mild improvement. AM labs revealed K 6.2 and he was shifted and given kayexalate.Trialysis catheter was placed this morning and he  subsequently developed hypotension and was started on levophed. He was intubated later this morning. Nephrology consulted for CACHORRO. All history obtained by primary team and chart review as patient was intubated/sedated on exam. Consent for dialysis obtained by patient's wife and placed in chart. Of note, both of patient's parents were on dialysis.     Interval History: Remains anuric. Poor intake. Hourly intake 40 ml TF. Tolerating iHD.     Review of patient's allergies indicates:   Allergen Reactions    No known drug allergies      Current Facility-Administered Medications   Medication Frequency    0.9%  NaCl infusion PRN    0.9%  NaCl infusion PRN    0.9%  NaCl infusion Once    0.9%  NaCl infusion PRN    acetaminophen oral solution 999.0148 mg TID    cetirizine tablet 5 mg Daily    chlorhexidine 0.12 % solution 15 mL BID    epoetin jose miguel injection 4,000 Units Every Mon, Wed, Fri    escitalopram oxalate tablet 20 mg Daily    famotidine tablet 20 mg QHS    heparin (porcine) injection 1,000 Units PRN    heparin (porcine) injection 5,000 Units Q12H    insulin aspart U-100 pen 0-4 Units Q4H PRN    insulin regular (Humulin R) 100 Units in sodium chloride 0.9% 100 mL infusion Continuous    levothyroxine injection 75 mcg Daily    midodrine tablet 10 mg PRN    midodrine tablet 2.5 mg TID    ondansetron disintegrating tablet 4 mg Q6H PRN    oxyCODONE 5 mg/5 mL solution 5 mg Q6H PRN    polyethylene glycol packet 17 g Daily    povidone-iodine 10 % ointment PRN    predniSONE tablet 5 mg Daily    QUEtiapine tablet 50 mg QHS    ramelteon tablet 8 mg Nightly PRN    sennosides 8.8 mg/5 ml syrup 5 mL QHS    tacrolimus capsule 1 mg BID       Objective:     Vital Signs (Most Recent):  Temp: 98.6 °F (37 °C) (04/23/18 1619)  Pulse: (!) 115 (04/23/18 1619)  Resp: 20 (04/23/18 1619)  BP: (!) 104/55 (04/23/18 1619)  SpO2: 95 % (04/23/18 1619)  O2 Device (Oxygen Therapy): room air (04/23/18 1619) Vital Signs (24h  Range):  Temp:  [97 °F (36.1 °C)-98.6 °F (37 °C)] 98.6 °F (37 °C)  Pulse:  [101-115] 115  Resp:  [15-20] 20  SpO2:  [94 %-98 %] 95 %  BP: ()/(53-72) 104/55     Weight: 74.8 kg (164 lb 14.5 oz) (04/20/18 1100)  Body mass index is 25.83 kg/m².  Body surface area is 1.88 meters squared.    I/O last 3 completed shifts:  In: 219.2 [I.V.:34.2; NG/GT:185]  Out: 0     Physical Exam   Constitutional: He appears well-developed and well-nourished. No distress.   Trach   HENT:   Head: Normocephalic and atraumatic.   Eyes: Conjunctivae and EOM are normal.   Cardiovascular: Regular rhythm.  Tachycardia present.    Pulmonary/Chest: He has no wheezes. He has no rales.   Abdominal: Soft. He exhibits no distension.   Musculoskeletal: He exhibits no edema, tenderness or deformity.   Neurological: He is alert.   Skin: Skin is warm and dry. He is not diaphoretic.       Significant Labs:  All labs within the past 24 hours have been reviewed.     Significant Imaging:  Labs: Reviewed    Assessment/Plan:     * Acute renal failure with acute tubular necrosis superimposed on stage 3 chronic kidney disease    - baseline sCr 2.6-3.0 consistent with CKD stage IV  - anuric CACHORRO; likely ischemic ATN from prolonged pre-renal state (diarrhea) in setting of prograf renal vasoconstriction; cannot r/o septic ATN or Prograf toxicity  - SLED started 3/14. TDC placed 4/4/18.   - remains anuric    Plan:  -Continue procrit TIW   -3 hour HD treatment today for metabolic clearance/volume management. Dialysate adjusted to current labs. UF goal 500-1L as tolerated.              Thank you for your consult. I will follow-up with patient. Please contact us if you have any additional questions.    Amanda Cuellar NP  Nephrology  [facility]

## 2018-04-23 NOTE — ASSESSMENT & PLAN NOTE
- Continue PTA  Escitalopram, will stop xanex and nortriptyline due to hallucinations and give sleep med and less pain meds.  - Increased Lexapro to 20mg daily per psych recs  - started Seroquel as per psych rec. Interaction with other agents cross check before starting. Slept well last two nights. No more AMS.

## 2018-04-23 NOTE — PT/OT/SLP PROGRESS
Physical Therapy      Patient Name:  Lv Crocker   MRN:  [id number]    Patient not seen today.  Attempted to treat pt 1430 and 1600, pt was out of the room for Dialysis. Will follow-up with pt at the next scheduled tx session.    Deepthi Xiao, PT

## 2018-04-23 NOTE — NURSING
Rounds Report: Attended interdisciplinary rounds this morning with the transplant team including SW, physicians, fellows,  mid-level providers, and transplant coordinators.  Discussed plan of care, including tolerating TF, HD. Repeating ECHO. OK medically to discharge end of week. Waiting on rehab clearance.

## 2018-04-23 NOTE — PROGRESS NOTES
BILLIE Cuellar NP notified HD trx ended 20 min early d/t clotting in dialysis system. Blood rinsed back.

## 2018-04-23 NOTE — PROGRESS NOTES
Acute dialysis: patient came in per bed by transporter,   Alert, oriented x 4, with tracheostomy intact,   With ongoing feeding,  Report given by the primary nurse,  With LEFT IJ permanent catheter intact, access clean and dry, HD started with goal of 1 liter.

## 2018-04-23 NOTE — PROGRESS NOTES
"[facility]  Endocrinology  Progress Note    Admit Date: 3/11/2018     Reason for Consult: abnormal TFTs    Surgical Procedure and Date: s/p heart transplant 2014     Diabetes diagnosis year: 5yo (T1DM)     Home Diabetes Medications:    Levemir 15u qHS  Novolog 4u AC     How often checking glucose at home? 4 times daily   BG readings on regimen: 150-200s  Hypoglycemia on the regimen?  Yes, rarely - treats appropriately (light snack, glucose tab)  Missed doses on regimen?  No        Diabetes Complications include:     Hyperglycemia, Hypoglycemia  and Diabetic chronic kidney disease          Complicating diabetes co morbidities:   N/A     HPI:   Patient is a 42 y.o. male with a diagnosis of T1DM, HTN, hypothyroidism, s/p heart transplant.  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 He required serial paracentesis and thoracentesis until he underwent a VATS with pleurX catheter placement on 1/11/2018 and removed 2/7/18.       Presented to hospital secondary to diarrhea and cough.  Upon initial labs, TSH was decreased (0.101) and free T4 1.33.  Endocrinology consulted to assist in management of these labs.  He was last seen in clinic by Kamala Vázquez NP 11/2017.   Previous hospitalization, endocrine consulted for same problem.  During that visit, recommended decreasing LT4 to 125mcg daily. Unfortunately, patient was discharged on previous dose of 150mcg daily.     Interval HPI:   Overnight events:  Eating:   NPO  Nausea: No  Hypoglycemia and intervention: No  Fever: No  TPN and/or TF: Yes  If yes, type of TF/TPN and rate: TF 40cc/hr    /71 (BP Location: Right arm, Patient Position: Lying)   Pulse 102   Temp 97.6 °F (36.4 °C) (Oral)   Resp 18   Ht 5' 7" (1.702 m)   Wt 74.8 kg (164 lb 14.5 oz)   SpO2 95%   BMI 25.83 kg/m²       Labs Reviewed and Include      Recent Labs  Lab " 04/23/18  0615   *   CALCIUM 10.6*   ALBUMIN 2.1*   PROT 6.2   *   K 4.6   CO2 18*   CL 99   BUN 50*   CREATININE 3.9*   ALKPHOS 199*   ALT 10   AST 13   BILITOT 0.3     Lab Results   Component Value Date    WBC 2.71 (L) 04/23/2018    HGB 8.0 (L) 04/23/2018    HCT 25.7 (L) 04/23/2018     (H) 04/23/2018     04/23/2018       Recent Labs  Lab 04/21/18  0334   TSH 7.085*   FREET4 0.87     Lab Results   Component Value Date    HGBA1C 5.1 04/05/2018       Nutritional status:   Body mass index is 25.83 kg/m².  Lab Results   Component Value Date    ALBUMIN 2.1 (L) 04/23/2018    ALBUMIN 2.1 (L) 04/22/2018    ALBUMIN 2.1 (L) 04/21/2018     Lab Results   Component Value Date    PREALBUMIN 13 (L) 04/08/2018    PREALBUMIN 5 (L) 03/15/2018    PREALBUMIN 18 (L) 03/24/2015       Estimated Creatinine Clearance: 22.6 mL/min (A) (based on SCr of 3.9 mg/dL (H)).    Accu-Checks  Recent Labs      04/21/18   2107  04/22/18   0041  04/22/18   0434  04/22/18   0833  04/22/18   1201  04/22/18   1627  04/22/18   2045  04/22/18   2359  04/23/18   0319  04/23/18   0740   POCTGLUCOSE  195*  249*  177*  147*  157*  190*  186*  184*  205*  236*       Current Medications and/or Treatments Impacting Glycemic Control  Immunotherapy:  Immunosuppressants         Stop Route Frequency     tacrolimus capsule 1 mg      -- SL 2 times daily        Steroids:   Hormones     Start     Stop Route Frequency Ordered    04/19/18 0900  predniSONE tablet 5 mg      -- PER G TUBE Daily 04/18/18 1106        Pressors:    Autonomic Drugs     Start     Stop Route Frequency Ordered    04/13/18 0859  midodrine tablet 10 mg      -- Oral As needed (PRN) 04/13/18 0800    04/09/18 1500  midodrine tablet 2.5 mg      -- Oral 3 times daily 04/09/18 1006        Hyperglycemia/Diabetes Medications: Antihyperglycemics     Start     Stop Route Frequency Ordered    04/11/18 0015  insulin regular (Humulin R) 100 Units in sodium chloride 0.9% 100 mL infusion      Question:  Insulin Rate Adjustment (DO NOT MODIFY ANSWER)  Answer:  [email or web address]    -- IV Continuous 04/10/18 2313    04/10/18 2221  insulin aspart U-100 pen 0-4 Units      -- SubQ Every 4 hours PRN 04/10/18 2122          ASSESSMENT and PLAN    * Acute renal failure with acute tubular necrosis superimposed on stage 3 chronic kidney disease    Avoid insulin stacking          Type 1 diabetes mellitus with stage 3 chronic kidney disease    BG goal 140-180, bg at goal     Increase transition to 0.8u/hr, low correction q4hr checks     Pt is T1DM, do not suspend insulin >1 hr   If TF held, decrease insulin rate to 0.2u/hr - known to have controlled BG on basal needs of lev 5 daily/ 0.2u/hr during this hospitalization        Discharge Recommendations:  TBD.        On enteral nutrition    TF at goal May increase insulin needs          Current chronic use of systemic steroids    Can increase insulin requirement        Acute respiratory failure with hypoxia    Per primary  S/p trach.  Practicing with speech valve.  Remains NPO        Hypothyroidism due to Hashimoto's thyroiditis    continue iv levothyroxine 75mcg daily  tsh improving, monitor q1-2 weeks              Palmira Dorsey MD  Endocrinology  [facility],  have personally taken the history and examined the patient and agree with the resident's note as stated above.

## 2018-04-23 NOTE — PT/OT/SLP EVAL
"Speech Language Pathology   Bedside Swallow Evaluation  Speaking Valve treatment     Patient Name:  Lv Crocker   MRN:  [id number]  Admitting Diagnosis: Acute renal failure with acute tubular necrosis superimposed on stage 3 chronic kidney disease   The primary encounter diagnosis was Iron deficiency anemia, unspecified iron deficiency anemia type. Diagnoses of Diarrhea, Cough, Heart transplanted, Diarrhea, unspecified type, Hypothyroidism due to Hashimoto's thyroiditis, Type 1 diabetes mellitus with stage 3 chronic kidney disease, CKD (chronic kidney disease), stage IV, Cough, persistent, Acute respiratory failure with hypoxia, Acute renal failure with acute tubular necrosis superimposed on stage 4 chronic kidney disease, Pneumonia of left lung due to infectious organism, unspecified part of lung, Dehydration, Restrictive cardiomyopathy, Anemia of chronic renal failure, stage 3 (moderate), Hypoxia, Heart failure, CHF (congestive heart failure), Acute encephalopathy, Shock, unspecified, Hypothyroidism, unspecified type, Anxiety, Current chronic use of systemic steroids, Debility, Acute renal failure with acute tubular necrosis superimposed on stage 3 chronic kidney disease, Severe malnutrition, Gangrene, Depression, unspecified depression type, Chronic insomnia, Gastroparesis, and Ileus were also pertinent to this visit.      Recommendations:                 General Recommendations:  Dysphagia therapy, Cognitive-linguistic therapy and One Way Speaking Valve  Diet recommendations:  NPO, NPO   Aspiration Precautions: Continue alternate means of nutrition and Frequent oral care   General Precautions: Standard, aspiration, fall  Communication strategies:  One way speaking valve    History:     Past Medical History:   Diagnosis Date    Anxiety     Arthritis     Ascites     Thought to be 2/2 heart tpx; liver bx 3/31/17 w/o significant fibrosis    Cardiomyopathy     Depression     Controlled "for the most " "part" on Lexipro    Encounter for blood transfusion     during transplant    GERD (gastroesophageal reflux disease)     Hashimoto's disease     History of CHF (congestive heart failure)     Previously EF 20% (prior to heart transplant); last EF 60%, PAP 41 as of 12/12/17; throught to be 2/2 genetics & DM1    History of coronary artery disease     H/o Coronary artery disease; resolved since heart transplant 2014    History of myocardial infarction     h/o MI x3 prior to heart transplant    HLD (hyperlipidemia) 6/11/2015    Hx of psychiatric care     Hypoglycemia unawareness in type 1 diabetes mellitus     Hypothyroid     Initially hyperthyroid 2/2 Hoshimoto's thyroiditis; now on levothyroxine 150 mcg qd    Pleural effusion due to another disorder     Thought to be 2/2 heart tpx; s/p PleuRx catheter placement and removal after 1-2 months    Psychiatric problem     Pulmonary hypertension assoc with unclear multi-factorial mechanisms 12/12/2017    PAP 41 (EF 60%) on ECHO 12/12/17    Syncope 6/30/2015    Type I diabetes mellitus, well controlled     Well controlled; Dx'd @7y/o; on N insulin 20U BID & Humalog 10U w/meals +SSI; Glu 89 11/9/17; A1c 7.2% 4/5/17    Unspecified essential hypertension 05/29/2014    Well controlled; Last /57 on 11/9/17       Past Surgical History:   Procedure Laterality Date    CARDIAC CATHETERIZATION      CARDIAC SURGERY      CHOLECYSTECTOMY      COLONOSCOPY N/A 3/13/2018    Procedure: COLONOSCOPY;  Surgeon: Tim Spencer MD;  Location: [address]);  Service: Endoscopy;  Laterality: N/A;    CORONARY ANGIOPLASTY      FOOT SPLIT TRANSFER OF THE POSTERIOR TIBIALIS TENDON PROCEDURE      HEART TRANSPLANT  2014    KNEE SURGERY      PleuRx catheter placement  01/11/2018    Plan for removal after 1-2 months by Dr. James in cardiothoracic surgery    s/p oht  November 2014    stent placemnet         Social History: Patient lives with Patient lives with " "Spouse in [place], [place].    Prior Intubation HX:  Pt was intubated 3/14/18 - 3/28/18. Tracheostomy was completed 3/28/18.  He was received PEG 4/4/18.    Modified Barium Swallow: none non file    Chest X-Rays: last CXR 4/9: Right pleural fluid is incompletely free-flowing.  Right lung remains small compared to the left.  I suspect persistent right lower lobe atelectasis of uncertain cause.    Prior diet: regular thin     Occupation/hobbies/homemaking:  Former . He enjoys fishing in his free time.     Subjective     SLP reviewed Pt with MD team , team inquires about swallow eval  Pt presents calm, cooperative  He explains, "I wore it [the valve] a little ov er the weekend when my family was visiting, otherwise I took it off."   Patient denies pain   Patient goals: "To have more to eat"    Pain/Comfort:  · Pain Rating 1: 0/10  · Pain Rating Post-Intervention 1: 0/10    Objective:   Pt found awake in bed, RT at bedside changing inner canula. PMSV placed following RT. HOB elevated. Oral care provided.     Oral Musculature Evaluation  · Oral Musculature: WFL  · Dentition: present and adequate  · Secretion Management: adequate  · Mucosal Quality: dry  · Mandibular Strength and Mobility: WFL  · Oral Labial Strength and Mobility: WFL  · Lingual Strength and Mobility: WFL  · Volitional Cough: elicited while donning PMSV  · Volitional Swallow: elicited, mildly delayed initiation     Bedside Swallow Eval:   Consistencies Assessed:  · Thin liquids ice chips x3, tsp sips x3, cup edge sips x1  · Puree 1/2 tsp bites x4     Oral Phase:   · WFL    Pharyngeal Phase:   · throat clearing    Compensatory Strategies  · Effortful swallow    Treatment: Pt seen for ongoing speaking valve training.  Pt with SaO2 at 97% prior to initiation of PMSV trial and t/o trial. Pt dons valve for 30 minutes w/o S/S respiratory distress. Pt with mildly hoarse vocal quality while donning valve. Pt with effective throat clear and productive " cough with valve in place. Pt educated on PMSV precautions, S/S aspiration, possibility of further assessment of swallow fx via MBS, and ongoing SLP POC. No further questions noted. Whiteboard current. Spouse at bedside t/o session,; however,er on phone t/o most of session.      Assessment:     Lv Crocker is a 42 y.o. male with an SLP diagnosis of Dysphagia, Cognitive-Linguistic Impairment, S/P Trach and One Way Speaking Valve Training.  He presents with inconsistent throat clearing with PO trials today.  Pt might benefit from further assessment of swallow fx via MBSS to determine safety/feasibility of re-intitiation of PO diet.  ST to continue to follow.     Goals:    SLP Goals        Problem: SLP Goal    Goal Priority Disciplines Outcome   SLP Goal     SLP Ongoing (interventions implemented as appropriate)   Description:  Speech Language Pathology Goals  Goals expected to be met by 4/20/18  1.  Pt will tolerate PMSV for 30 minutes w/ no change in respiratory status and fair voicing produced.  2.  Pt will participate in ongoing swallow assessment   3. Pt will name up to 12 items/minute in a concrete category, 90% of attempts, Supervision, to improve cognitive organization  4. Pt will recall 3 related items post 3 minute filled delay, 90% of attempts, Supervision, to improve STM   5. Pt will answer m/u YNQ with 90% accuracy, Supervision, to improve higher-level comprehension   6. Pt will complete further assessment of reading/writing skills  7. Educate Pt on aspiration precautions and S/S aspiration  8. Educate Pt and family on PMSV precautions and safety awareness     Goals expected to be met by 4/20/18  1.  Pt will tolerate PMSV for 30 minutes w/ no change in respiratory status and fair voicing produced.  2.  Pt will complete further evaluation of cognitive-linguistic communication skills to determine the need for additional goals. Met 4/19  3. Pt will name up to 12 items/minute in a concrete category,  90% of attempts, Supervision, to improve cognitive organization  4. Pt will recall 3 related items post 3 minute filled delay, 90% of attempts, Supervision, to improve STM   5. Pt will answer m/u YNQ with 90% accuracy, Supervision, to improve higher-level comprehension   6. Pt will complete further assessment of reading/writing skills  7. Pending MD clearance, Pt will complete further assessment of swallow fx   8. Educate Pt and family on PMSV precautions and safety awareness      Goals expected to be met by 4/13/18     1.  Pt will tolerate PMSV for 3 min w/ no change in respiratory status and fair voicing produced. Met   2.  Pt will complete further evaluation of cognitive-linguistic communication skills to determine the need for additional goals.                           Plan:     · Patient to be seen:  5 x/week   · Plan of Care expires:  05/05/18  · Plan of Care reviewed with:  patient, spouse   · SLP Follow-Up:  Yes       Discharge recommendations:  rehabilitation facility   Barriers to Discharge:  Level of Skilled Assistance Needed     Time Tracking:     SLP Treatment Date:   04/23/18  Speech Start Time:  1100  Speech Stop Time:  1132     Speech Total Time (min):  32 min    Billable Minutes: Eval Swallow and Oral Function 15 and Therapeutic Speech Device 17    JOSELYN Delarosa., CCC-SLP  Speech-Language Pathology  Pager: [phone]      04/23/2018

## 2018-04-23 NOTE — ASSESSMENT & PLAN NOTE
- Appreciate Nephrology recs.   - Continue middorine for pressure support  - HD per nephrology. Goal is SLED vs evening HD to allow for more time with therapy and better sleep hygiene.

## 2018-04-23 NOTE — ASSESSMENT & PLAN NOTE
- Resolved.   - S/P Bronch and intubation 3/14 now post tracheostomy due to inability to extubate  - Pulmonology signed off. Completely weaned off vent.   - RSV positive early and completed course of Ribavarin/IVIG.  - ID had been following. Completed 7 day course of Meropenem/Linezolid earlier in hospitalization  - Appreciate PT/OT/Wound care.  - will discuss capping trach with respiratory therapy today

## 2018-04-23 NOTE — ASSESSMENT & PLAN NOTE
-  Anxiety and sleeping difficulty   -  Psych consulted--> increased Lexapro to 20 mg, started Seroquel--> stopped xanax and nortriptyline 2/2 hallucinations

## 2018-04-23 NOTE — ASSESSMENT & PLAN NOTE
- abdominal pain improved from weekend  - BM on 4/214, passing gas  - KUB without evidence of bowel obstructionunclear if  worsening gastroparesis or new partial obstruction  - TF restarted and @ 40 hr. Per GI recs patient should tolerate up to 400 mL residuals. Will wean narcotics. Can complete CT AP if needed however pain is mild and improving at this point  - CMV PCR pending

## 2018-04-23 NOTE — PROGRESS NOTES
[facility]  Heart Transplant  Progress Note    Patient Name: Lv Crocker  MRN: [id number]  Admission Date: 3/11/2018  Hospital Length of Stay: 42 days  Attending Physician: Behzad Lara Jr.,*  Primary Care Provider: Philippe Mohr MD  Principal Problem:Acute renal failure with acute tubular necrosis superimposed on stage 3 chronic kidney disease    Subjective:     Interval History: No complaints overnight. Slept well, no more AMS or hallucinations per patient and wife. Abdominal discomfort mild but improved from the weekend. Passing gas, last BM 4/21.    Continuous Infusions:   insulin (HUMAN R) infusion (adults) 0.7 Units/hr (04/22/18 1756)     Scheduled Meds:   sodium chloride 0.9%   Intravenous Once    sodium chloride 0.9%   Intravenous Once    acetaminophen  999.0148 mg Per G Tube TID    cetirizine  5 mg Per G Tube Daily    chlorhexidine  15 mL Mouth/Throat BID    epoetin jose miguel (PROCRIT) injection  4,000 Units Intravenous Every Mon, Wed, Fri    escitalopram oxalate  20 mg Per G Tube Daily    famotidine  20 mg Per G Tube QHS    heparin (porcine)  5,000 Units Subcutaneous Q12H    levothyroxine  75 mcg Intravenous Daily    midodrine  2.5 mg Oral TID    polyethylene glycol  17 g Oral Daily    predniSONE  5 mg Per G Tube Daily    QUEtiapine  50 mg Oral QHS    sennosides 8.8 mg/5 ml  5 mL Oral QHS    tacrolimus  1 mg Sublingual BID     PRN Meds:sodium chloride 0.9%, sodium chloride 0.9%, sodium chloride 0.9%, heparin (porcine), insulin aspart U-100, midodrine, ondansetron, oxyCODONE, povidone-iodine, ramelteon    Review of patient's allergies indicates:   Allergen Reactions    No known drug allergies      Objective:     Vital Signs (Most Recent):  Temp: 97.6 °F (36.4 °C) (04/23/18 0734)  Pulse: 102 (04/23/18 0740)  Resp: 18 (04/23/18 0734)  BP: 108/71 (04/23/18 0734)  SpO2: 95 % (04/23/18 0734) Vital Signs (24h Range):  Temp:  [97 °F (36.1 °C)-98.1 °F (36.7 °C)]  97.6 °F (36.4 °C)  Pulse:  [101-111] 102  Resp:  [15-18] 18  SpO2:  [94 %-100 %] 95 %  BP: ()/(65-74) 108/71     Patient Vitals for the past 72 hrs (Last 3 readings):   Weight   04/20/18 1100 74.8 kg (164 lb 14.5 oz)     Body mass index is 25.83 kg/m².      Intake/Output Summary (Last 24 hours) at 04/23/18 1052  Last data filed at 04/23/18 0317   Gross per 24 hour   Intake            134.8 ml   Output                0 ml   Net            134.8 ml       Hemodynamic Parameters:       Physical Exam   Constitutional: He is oriented to person, place, and time.   Generalized weakness but normal ROM   Neck: No JVD present. Trache in place and well  secured  Cardiovascular: Normal rate, regular rhythm and normal heart sounds.    Pulmonary/Chest: Effort normal and breath sounds normal.   Abdominal: Soft. Bowel sounds are normal. Mild diffuse TTP with deep palpation  Musculoskeletal: Normal range of motion. He exhibits no edema or tenderness.   Neurological: He is alert and oriented to person, place, and time.   Skin: Skin is warm and dry.   Psychiatric: - able to interact and ask questions this morning  Nursing note and vitals reviewed.    Significant Labs:  CBC:    Recent Labs  Lab 04/21/18 0334 04/22/18  0532 04/23/18  0615   WBC 3.46* 2.69* 2.71*   RBC 2.66* 2.60* 2.57*   HGB 8.3* 8.2* 8.0*   HCT 27.2* 26.6* 25.7*    185 193   * 102* 100*   MCH 31.2* 31.5* 31.1*   MCHC 30.5* 30.8* 31.1*     BNP:  No results for input(s): BNP in the last 168 hours.    Invalid input(s): BNPTRIAGELBLO  CMP:    Recent Labs  Lab 04/21/18  0334 04/22/18  0532 04/23/18  0615   * 173* 221*   CALCIUM 9.2 10.6* 10.6*   ALBUMIN 2.1* 2.1* 2.1*   PROT 6.2 6.2 6.2   * 136 134*   K 3.8 4.5 4.6   CO2 22* 22* 18*    101 99   BUN 25* 38* 50*   CREATININE 1.8* 2.9* 3.9*   ALKPHOS 209* 210* 199*   ALT 11 11 10   AST 13 11 13   BILITOT 0.5 0.4 0.3      Coagulation:   No results for input(s): PT, INR, APTT in the last  168 hours.  LDH:  No results for input(s): LDH in the last 72 hours.  Microbiology:  Microbiology Results (last 7 days)     Procedure Component Value Units Date/Time    Fungus culture [[id number]] Collected:  03/14/18 1132    Order Status:  Completed Specimen:  Bronchial Wash from Amniotic Fluid Updated:  04/18/18 1301     Fungus (Mycology) Culture No fungus isolated after 4 weeks          I have reviewed all pertinent labs within the past 24 hours.      Estimated Creatinine Clearance: 22.6 mL/min (A) (based on SCr of 3.9 mg/dL (H)).    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 Most recently has had ongoing issues with his lungs, had gotten 2 thoracenteses, after which he underwent VATS 01/19/18 followed by pleurex catheter placement and effusion drainage 01/11/18, subsequently had it removed 02/07/18 after drainage had decreased despite multiple attempts to drain it. Has been having significant coughing fits that result in him being near-syncopal at times, although difficult to say if he has passed out or not- patient most recently was admitted to the hospital for increased AGUILAR, coughing and pre-syncope 02/11-02/14/18 at [facility].   Patient was started on antibiotics for suspected bacterial infection, with some diarrhea, bronchitis, and possible pneumonia. Ct chest showed some pleural fluid collection and after talking with Dr. James, we arranged for him to receive a diagnostic tap with IR, from which the fluid collection demonstrated no growth or significant findings at all really. Cell counts do not appear to have been sent but will recheck. Patient states since his hospital stay, yesterday had a significant coughing fit again, after which he nearly passed out, is being seen in clinic today for  this. Denies any cardiopulmonary complaints, no leg swelling, has some abdominal swelling, which is moderate for him. Denies significant shortness of breath with mild exertion, had 6MWT yesterday to see if he qualified for home O2, and his O2 sats actually improved after recovery from where he started initially. He and his wife admit he is in bed most days, not very active.     Mr. Crocker presented to the ED 3/11/18 with approximately 3 weeks of worsening diarrhea and 2-3 days of sinus pressure, drainage, and worsened cough.  He notes that he had a coughing fit last night and passed out, coughed throughout most of the night.  This also happened on 2/25/18.  He last saw Dr Ferreira in clinic on 2/20/18 where he was taken off myfortic and switched to cellcept (he hadn't been on cellcept because of leukopenia when they tried post-transplant).  Though his diarrhea had improved after his last discharge, he states it has increased now to 15x/day, clear/yellow/green, no fevers, no exacerbating foods per say.  He was taken back off the cellcept and placed back on myfortic this past week and has not noticed immediate change in diarrhea. He also reports his baseline coughing fits havent improved and are minimally responsive to tessalon pearls.  Came on last night, he lost consciousness during a fit once which is relatively normal for him, and had two more during HPI.  He notes no sick contacts but does state he's had some URI symptoms as above.  His baseline vitals are usually -120 and BP 90s/60s-110s/70s.  He reports a history of intermittent diarrhea - did have Cdiff many years ago but this smells different.  No abdominal pain, no nausea, no vomiting.  No blood in diarrhea.  Appears last c-scope in 2015 with no evidence of infection or inflammatory processes.  He does report IBS which is different that this.       * Acute renal failure with acute tubular necrosis superimposed on stage 3 chronic kidney disease    -  Appreciate Nephrology recs.   - Continue middorine for pressure support  - HD per nephrology. Goal is SLED vs evening HD to allow for more time with therapy and better sleep hygiene.         Gastroparesis    - abdominal pain improved from weekend  - BM on 4/214, passing gas  - KUB without evidence of bowel obstructionunclear if  worsening gastroparesis or new partial obstruction  - TF restarted and @ 40 hr. Per GI recs patient should tolerate up to 400 mL residuals. Will wean narcotics. Can complete CT AP if needed however pain is mild and improving at this point  - CMV PCR pending        Alteration in skin integrity    - wound care following        Acute respiratory failure with hypoxia    - Resolved.   - S/P Bronch and intubation 3/14 now post tracheostomy due to inability to extubate  - Pulmonology signed off. Completely weaned off vent.   - RSV positive early and completed course of Ribavarin/IVIG.  - ID had been following. Completed 7 day course of Meropenem/Linezolid earlier in hospitalization  - Appreciate PT/OT/Wound care.  - will discuss capping trach with respiratory therapy today        Restrictive cardiomyopathy    - No changes from before.   - Has known history of recurrent ascites and pleural effusions   - S/P thoracentesis X 2, followed by VATS/pleurex cath placement (January 2018) with removal of pleurex (February 2018) and 3rd thoracentesis 2/14/18 with no significant findings on fluid removal.  - repeat echo pending today        Type 1 diabetes mellitus with stage 3 chronic kidney disease    - Appreciate Endocrine's recs  - Insulin gtt per endocrine recs. Adjust as needed        Hypothyroidism due to Hashimoto's thyroiditis    - Appreciate Endocrine's recs  - Continue Levothyroxine 75mcg IV daily        Anemia    - Transfused 2 units of PRBCs on 4/11 during HD  - Nephrology has resumed ProCrit         Heart transplanted    - TTE 3/26/18 showed normal graft function but has known history of  restrictive CM post transplant.   - Repeat echo today pending.  - Continue pred 5  - Tacro- current dose is 1/1 daily, adjust as needed for goal 6-10  - Cellcept remains on hold due to leukopenia          Debility    - PT/OT/SLP daily. Recommend rehab.  - Nutrition following.   - Continue tube feeds. Switched from glucerna to novasource renal (start at 10 and increase to goal of 40 as tolerated)        Anxiety    - Continue PTA  Escitalopram, will stop xanex and nortriptyline due to hallucinations and give sleep med and less pain meds.  - Increased Lexapro to 20mg daily per psych recs  - started Seroquel as per psych rec. Interaction with other agents cross check before starting. Slept well last two nights. No more AMS.            Kelsea Ryan PA-C  Heart Transplant  [facility]

## 2018-04-23 NOTE — SUBJECTIVE & OBJECTIVE
"Interval HPI:   Overnight events:  Eating:   NPO  Nausea: No  Hypoglycemia and intervention: No  Fever: No  TPN and/or TF: Yes  If yes, type of TF/TPN and rate: TF 40cc/hr    /71 (BP Location: Right arm, Patient Position: Lying)   Pulse 102   Temp 97.6 °F (36.4 °C) (Oral)   Resp 18   Ht 5' 7" (1.702 m)   Wt 74.8 kg (164 lb 14.5 oz)   SpO2 95%   BMI 25.83 kg/m²     Labs Reviewed and Include      Recent Labs  Lab 04/23/18  0615   *   CALCIUM 10.6*   ALBUMIN 2.1*   PROT 6.2   *   K 4.6   CO2 18*   CL 99   BUN 50*   CREATININE 3.9*   ALKPHOS 199*   ALT 10   AST 13   BILITOT 0.3     Lab Results   Component Value Date    WBC 2.71 (L) 04/23/2018    HGB 8.0 (L) 04/23/2018    HCT 25.7 (L) 04/23/2018     (H) 04/23/2018     04/23/2018       Recent Labs  Lab 04/21/18  0334   TSH 7.085*   FREET4 0.87     Lab Results   Component Value Date    HGBA1C 5.1 04/05/2018       Nutritional status:   Body mass index is 25.83 kg/m².  Lab Results   Component Value Date    ALBUMIN 2.1 (L) 04/23/2018    ALBUMIN 2.1 (L) 04/22/2018    ALBUMIN 2.1 (L) 04/21/2018     Lab Results   Component Value Date    PREALBUMIN 13 (L) 04/08/2018    PREALBUMIN 5 (L) 03/15/2018    PREALBUMIN 18 (L) 03/24/2015       Estimated Creatinine Clearance: 22.6 mL/min (A) (based on SCr of 3.9 mg/dL (H)).    Accu-Checks  Recent Labs      04/21/18   2107  04/22/18   0041  04/22/18   0434  04/22/18   0833  04/22/18   1201  04/22/18   1627  04/22/18   2045  04/22/18   2359  04/23/18   0319  04/23/18   0740   POCTGLUCOSE  195*  249*  177*  147*  157*  190*  186*  184*  205*  236*       Current Medications and/or Treatments Impacting Glycemic Control  Immunotherapy:  Immunosuppressants         Stop Route Frequency     tacrolimus capsule 1 mg      -- SL 2 times daily        Steroids:   Hormones     Start     Stop Route Frequency Ordered    04/19/18 0900  predniSONE tablet 5 mg      -- PER G TUBE Daily 04/18/18 1106        Pressors:  "   Autonomic Drugs     Start     Stop Route Frequency Ordered    04/13/18 0859  midodrine tablet 10 mg      -- Oral As needed (PRN) 04/13/18 0800    04/09/18 1500  midodrine tablet 2.5 mg      -- Oral 3 times daily 04/09/18 1006        Hyperglycemia/Diabetes Medications: Antihyperglycemics     Start     Stop Route Frequency Ordered    04/11/18 0015  insulin regular (Humulin R) 100 Units in sodium chloride 0.9% 100 mL infusion     Question:  Insulin Rate Adjustment (DO NOT MODIFY ANSWER)  Answer:  [email or web address]    -- IV Continuous 04/10/18 2313    04/10/18 2221  insulin aspart U-100 pen 0-4 Units      -- SubQ Every 4 hours PRN 04/10/18 2122

## 2018-04-23 NOTE — ASSESSMENT & PLAN NOTE
- PT/OT/SLP daily. Recommend rehab.  - Nutrition following.   - Continue tube feeds. Switched from glucerna to novasource renal (start at 10 and increase to goal of 40 as tolerated)

## 2018-04-23 NOTE — ASSESSMENT & PLAN NOTE
BG goal 140-180, bg at goal     Increase transition to 0.8u/hr, low correction q4hr checks     Pt is T1DM, do not suspend insulin >1 hr   If TF held, decrease insulin rate to 0.2u/hr - known to have controlled BG on basal needs of lev 5 daily/ 0.2u/hr during this hospitalization        Discharge Recommendations:  TBD.

## 2018-04-23 NOTE — SUBJECTIVE & OBJECTIVE
Interval History: Remains anuric. Poor intake. Hourly intake 40 ml TF. Tolerating iHD.     Review of patient's allergies indicates:   Allergen Reactions    No known drug allergies      Current Facility-Administered Medications   Medication Frequency    0.9%  NaCl infusion PRN    0.9%  NaCl infusion PRN    0.9%  NaCl infusion Once    0.9%  NaCl infusion PRN    acetaminophen oral solution 999.0148 mg TID    cetirizine tablet 5 mg Daily    chlorhexidine 0.12 % solution 15 mL BID    epoetin jose miguel injection 4,000 Units Every Mon, Wed, Fri    escitalopram oxalate tablet 20 mg Daily    famotidine tablet 20 mg QHS    heparin (porcine) injection 1,000 Units PRN    heparin (porcine) injection 5,000 Units Q12H    insulin aspart U-100 pen 0-4 Units Q4H PRN    insulin regular (Humulin R) 100 Units in sodium chloride 0.9% 100 mL infusion Continuous    levothyroxine injection 75 mcg Daily    midodrine tablet 10 mg PRN    midodrine tablet 2.5 mg TID    ondansetron disintegrating tablet 4 mg Q6H PRN    oxyCODONE 5 mg/5 mL solution 5 mg Q6H PRN    polyethylene glycol packet 17 g Daily    povidone-iodine 10 % ointment PRN    predniSONE tablet 5 mg Daily    QUEtiapine tablet 50 mg QHS    ramelteon tablet 8 mg Nightly PRN    sennosides 8.8 mg/5 ml syrup 5 mL QHS    tacrolimus capsule 1 mg BID       Objective:     Vital Signs (Most Recent):  Temp: 98.6 °F (37 °C) (04/23/18 1619)  Pulse: (!) 115 (04/23/18 1619)  Resp: 20 (04/23/18 1619)  BP: (!) 104/55 (04/23/18 1619)  SpO2: 95 % (04/23/18 1619)  O2 Device (Oxygen Therapy): room air (04/23/18 1619) Vital Signs (24h Range):  Temp:  [97 °F (36.1 °C)-98.6 °F (37 °C)] 98.6 °F (37 °C)  Pulse:  [101-115] 115  Resp:  [15-20] 20  SpO2:  [94 %-98 %] 95 %  BP: ()/(53-72) 104/55     Weight: 74.8 kg (164 lb 14.5 oz) (04/20/18 1100)  Body mass index is 25.83 kg/m².  Body surface area is 1.88 meters squared.    I/O last 3 completed shifts:  In: 219.2 [I.V.:34.2;  NG/GT:185]  Out: 0     Physical Exam   Constitutional: He appears well-developed and well-nourished. No distress.   Trach   HENT:   Head: Normocephalic and atraumatic.   Eyes: Conjunctivae and EOM are normal.   Cardiovascular: Regular rhythm.  Tachycardia present.    Pulmonary/Chest: He has no wheezes. He has no rales.   Abdominal: Soft. He exhibits no distension.   Musculoskeletal: He exhibits no edema, tenderness or deformity.   Neurological: He is alert.   Skin: Skin is warm and dry. He is not diaphoretic.       Significant Labs:  All labs within the past 24 hours have been reviewed.     Significant Imaging:  Labs: Reviewed

## 2018-04-23 NOTE — PT/OT/SLP PROGRESS
Occupational Therapy      Patient Name:  Lv Crocker   MRN:  [id number]    Patient not seen today secondary to Dialysis. Will follow-up as schedule allows.    Delia To OT  4/23/2018

## 2018-04-23 NOTE — PROGRESS NOTES
[facility]  Physical Medicine & Rehab  Progress Note    Patient Name: Lv Crocker  MRN: [id number]  Admission Date: 3/11/2018  Length of Stay: 42 days  Attending Physician: Behzad Lara Jr.,*    Subjective:     Principal Problem:Acute renal failure with acute tubular necrosis superimposed on stage 3 chronic kidney disease    Hospital Course:   3/24/18:  Evaluated by PT and OT.  Bed mobility and transfers deferred 2/2 intubation and CRRT.   3/26/18:  Participated with PT.  Bed mobility totalA-dependent.  EOB totalA.  3/28/18:  Participated with PT and OT.  Bed mobility totalA-dependent.  ADLs totalA.   4/16/18:  Participated with PT and OT.  Bed mobility max-totalA/dependent.  Sit to stand totalA and transfers totalA.  EOB mod-totalA.  UBD totalA and LBD totalA.  4/17/18:  Participated with PT and SLP.  Bed mobility total-dependent.  EOB x 15 minutes mod-maxA.   4/18/18:  Participated with OT.  Bed mobility totalA.  EOB x 15 minutes totalA.    4/19/18:  Participated with PT, OT, and SLP.  Found to have cognitive-linguistic impairment and dysphonia.  Remains NPO.  Bed mobility total/dependent.  EOB x 24 minutes (static sit x ~10 seconds + ~5 seconds SV) mod-maxA.  No sit to stand, transfers, or gait.  ADLs totalA.   4/22/18:  Participated with PT and OT.  Bed mobility totalA.  EOB maxA.  Sit to stand max-totalA and transfers max-totalA.  LBD maxA.    Interval History 4/23/2018:  Patient is seen for follow-up rehab evaluation and recommendations: No acute events over night.  Sleeping better.  Participating with therapy.    HPI, Past Medical, Family, and Social History remains the same as documented in the initial encounter.    Scheduled Medications:    sodium chloride 0.9%   Intravenous Once    sodium chloride 0.9%   Intravenous Once    acetaminophen  999.0148 mg Per G Tube TID    cetirizine  5 mg Per G Tube Daily    chlorhexidine  15 mL Mouth/Throat BID    epoetin jose miguel  (PROCRIT) injection  4,000 Units Intravenous Every Mon, Wed, Fri    escitalopram oxalate  20 mg Per G Tube Daily    famotidine  20 mg Per G Tube QHS    heparin (porcine)  5,000 Units Subcutaneous Q12H    levothyroxine  75 mcg Intravenous Daily    midodrine  2.5 mg Oral TID    polyethylene glycol  17 g Oral Daily    predniSONE  5 mg Per G Tube Daily    QUEtiapine  50 mg Oral QHS    sennosides 8.8 mg/5 ml  5 mL Oral QHS    tacrolimus  1 mg Sublingual BID     PRN Medications: sodium chloride 0.9%, sodium chloride 0.9%, sodium chloride 0.9%, heparin (porcine), insulin aspart U-100, midodrine, ondansetron, oxyCODONE, povidone-iodine, ramelteon    Review of Systems   Constitutional: Positive for activity change. Negative for chills, fatigue and fever.   HENT: Negative for drooling, hearing loss, trouble swallowing and voice change.    Eyes: Negative for pain and visual disturbance.   Respiratory: Negative for cough and wheezing.         + trach   Cardiovascular: Negative for chest pain and palpitations.   Gastrointestinal: Negative for abdominal pain, nausea and vomiting.        + PEG   Genitourinary: Negative for difficulty urinating and flank pain.   Musculoskeletal: Positive for gait problem and myalgias. Negative for arthralgias, back pain and neck pain.   Skin: Positive for color change and wound. Negative for rash.   Allergic/Immunologic: Positive for immunocompromised state.   Neurological: Positive for weakness. Negative for dizziness, numbness and headaches.   Psychiatric/Behavioral: Positive for hallucinations (improving) and sleep disturbance (improving). Negative for agitation. The patient is not nervous/anxious.      Objective:     Vital Signs (Most Recent):  Temp: 98 °F (36.7 °C) (04/23/18 1245)  Pulse: 104 (04/23/18 1257)  Resp: 17 (04/23/18 1245)  BP: 119/72 (04/23/18 1257)  SpO2: 97 % (04/23/18 1245)    Vital Signs (24h Range):  Temp:  [97 °F (36.1 °C)-98.1 °F (36.7 °C)] 98 °F (36.7 °C)  Pulse:   "[101-108] 104  Resp:  [15-18] 17  SpO2:  [94 %-98 %] 97 %  BP: (108-132)/(64-74) 119/72     Physical Exam   Constitutional: He is oriented to person, place, and time. He appears well-developed. He appears ill. No distress.   HENT:   Head: Normocephalic and atraumatic.   Right Ear: External ear normal.   Left Ear: External ear normal.   Nose: Nose normal.   Eyes: Right eye exhibits no discharge. Left eye exhibits no discharge. No scleral icterus.   Neck: Neck supple.   Trach intact.    Cardiovascular: Normal rate, regular rhythm and intact distal pulses.    Pulmonary/Chest: Effort normal. No respiratory distress. He has no wheezes.   Abdominal: Soft. He exhibits no distension. There is no tenderness.   PEG intact, incision LETICIA, CDI   Musculoskeletal: He exhibits no edema or tenderness.   R foot: R great toe and R 3rd toe with necrotic tissue  L foot: small amount of necrotic tissue to L great toe  Overall deconditioning   Neurological: He is alert and oriented to person, place, and time.   -  Mental Status:  AAOx3.  Follows commands.  Answers correct age and .  Recent and remote memory intact.  -  Speech and language:  no aphasia or dysarthria.    -  Motor:  RUE: 2/5, 3/5 .  LUE: 2+/5, 3/5 .  RLE: 2/5, DF 3+/5, PF 3+/5.  LLE: 2/5, DF 2/5, PF 3+/5.  -  Sensory:  Intact to light touch and pin prick.   Skin: Skin is warm and dry. No rash noted.   Psychiatric: His behavior is normal. Thought content normal. His affect is blunt.   Vitals reviewed.    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 Ischemic changes to right second toe and left hallux  -  Wound care following  -  Podiatry following         Severe malnutrition    -  S/p PEG placement on 4/4/18  -  On continuous TF        Acute respiratory failure with hypoxia    -  Intubated on 3/14/18--> s/p trach 3/28/18--> now on RA  -  Completed 7 day course of Meropenem/Linezolid   -  RSV positive early- completed course of Ribavarin/IVIG        Type 1 diabetes mellitus with stage 3 chronic kidney disease    -  Endocrine following  -  On insulin gtt        Heart transplanted    -  S/p OHTX 11/18/2014 with early post-op course complicated by hemorrhagic tamponade s/p  washout, delayed closure, pericardial window with subsequent a-fib with RVR and CACHORRO and late course complicated by ABMR (in 4/2015 s/p rituxan, PLEX, and IVIG), C.diff/CMV reactivation, CKD, and restrictive cardiomyopathy with subsequent chronic/recurrent pleural effusions and ascites previously requiring monthly paracentesis and thoracentesis until he underwent VATS with pleurX catheter placement on 1/11/2018 with removal on 2/7/2018        Debility    -  2/2 prolonged and acute hospital course  -  (I) ADLs and mobility prior to admission, limited by fatigue/endurance  Recommendations  -  Encourage mobility, OOB in chair, and early ambulation as appropriate  -  PT/OT evaluate and treat  -  Pain management  -  Monitor for and prevent skin breakdown and pressure ulcers  · Early mobility, repositioning/weight shifting every 20-30 minutes when sitting, turn patient every 2 hours, proper mattress/overlay and chair cushioning, pressure relief/heel protector boots  -  DVT prophylaxis:  MARK, SCD        Anxiety    -  Anxiety and sleeping difficulty   -  Psych consulted--> increased Lexapro to 20 mg, started Seroquel--> stopped xanax and nortriptyline 2/2 hallucinations        Patient with rehab goals.  Will continue to follow progress and discuss with  rehab team for final rehab recommendation.    RENETTA Caldwell  Department of Physical Medicine & Rehab   [facility]

## 2018-04-23 NOTE — ASSESSMENT & PLAN NOTE
- baseline sCr 2.6-3.0 consistent with CKD stage IV  - anuric CACHORRO; likely ischemic ATN from prolonged pre-renal state (diarrhea) in setting of prograf renal vasoconstriction; cannot r/o septic ATN or Prograf toxicity  - SLED started 3/14. TDC placed 4/4/18.   - remains anuric    Plan:  -Continue procrit TIW   -3 hour HD treatment today for metabolic clearance/volume management. Dialysate adjusted to current labs. UF goal 500-1L as tolerated.

## 2018-04-23 NOTE — PLAN OF CARE
Problem: Patient Care Overview  Goal: Plan of Care Review  Outcome: Ongoing (interventions implemented as appropriate)  Pt aao x4. Call bell within reach (squeezable ball). Pt able to talk with trach valve on. Pt on air overlay mattress and turning q 2 hours using pillows or wedge (pt's preference). Heels are floated with heel protector boots. He is tolerating TF novasouce at 40ml/hr. Residual 5ml today. No c/o of nausea or abdominal discomfort. He worked with speech therapy today. He had HD done today with 500ml removed. Will continue to monitor.

## 2018-04-23 NOTE — HOSPITAL COURSE
"4/23/2018 - Lexapro dose was increased back to 20 mg PO daily on 4/20/2018. Patient denies any side effects from this medication. He states that his sleep has improved. He denies significant anxiety or depression. His mood is "good" today. Patient denies SI/HI/AVH.   "

## 2018-04-23 NOTE — ASSESSMENT & PLAN NOTE
- No changes from before.   - Has known history of recurrent ascites and pleural effusions   - S/P thoracentesis X 2, followed by VATS/pleurex cath placement (January 2018) with removal of pleurex (February 2018) and 3rd thoracentesis 2/14/18 with no significant findings on fluid removal.  - repeat echo pending today

## 2018-04-23 NOTE — PLAN OF CARE
Problem: Patient Care Overview  Goal: Plan of Care Review  Outcome: Ongoing (interventions implemented as appropriate)  Acute Dialysis: 2 hours, 40 minutes dialysis completed, rinsed back 20 minutes early due to clot formation in the venous chamber. Blood returned, NET UF REMOVED: 500ml. Patient tolerated well  -report given to primary nurse,  -transported back to 8098A via bed accompanied by nurse and transporter

## 2018-04-24 NOTE — PROGRESS NOTES
"UPDATE    SW to pt's room for update. Pt presents as aaox3 with calm affect. Pt reports coping adequately, although he did not sleep last night despite the use of a sleep aid. Pt's wife Elizabeth at bedside. Elizabeth is tearful and states she has been "demoted" at work. Pt's wife reports she has worked for the same MD for 16 years and now she will not work in the same office, but "float" between Md's. Wife states she does not think she will lose her job, but she is disappointed that she will not be working in the same role. SW provided reflective listening and counseling support. Pt and wife report no other needs at this time.    [place] [facility] [place] because they are not able to do trach care at this time. [facility] faxed HD referral to Zac rodriguez [phone], fax [phone]. SW providing psychosocial and counseling support, education, resources, and d/c planning as needed. SW continuing to follow and remains available.    "

## 2018-04-24 NOTE — PLAN OF CARE
AAO x 4. VSS, afebrile, SpO2>92% on RA. #6 Shiley trach CDI, trach humidification worn overnight.   Insulin gtt at 0.8 units/hr continuous with SSI orders. BG monitoring Q4H.  Strict NPO. TFs infusing at goal of 40 mL to L peg, site CDI. HOB elevated>30 degrees.   Air overlay present. DTI to buttock and B heels. Heel protector boots present. Necrotic toes. Turn Q2H.   Pt reporting R calf pain. Calf not warm or redden. On call notified. No new orders placed.  Plan for routine 2D echo today.  POC reviewed with pt and spouse at bedside. See flowsheet for assessment findings. Will continue to monitor.

## 2018-04-24 NOTE — SUBJECTIVE & OBJECTIVE
"Interval HPI:   Overnight events:  BG above goal on transition 0.8, TF infusing at 40cc/hr, remains NPO.   Trach.   On CRRT  On PDN 5mg daily   Low normal BP noted   Remains IV LT4 75mcg daily    BP (!) 94/53 (BP Location: Right arm, Patient Position: Lying)   Pulse (!) 112   Temp 98.3 °F (36.8 °C) (Oral)   Resp 20   Ht 5' 7" (1.702 m)   Wt 74.8 kg (164 lb 14.5 oz)   SpO2 95% Comment: 95  BMI 25.83 kg/m²     Labs Reviewed and Include      Recent Labs  Lab 04/24/18  0532   *   CALCIUM 9.2   ALBUMIN 1.9*   PROT 5.7*   *   K 3.4*   CO2 23   CL 96   BUN 33*   CREATININE 2.4*   ALKPHOS 203*   ALT 9*   AST 13   BILITOT 0.3     Lab Results   Component Value Date    WBC 3.06 (L) 04/24/2018    HGB 7.5 (L) 04/24/2018    HCT 24.2 (L) 04/24/2018     (H) 04/24/2018     04/24/2018       Recent Labs  Lab 04/21/18  0334   TSH 7.085*   FREET4 0.87     Lab Results   Component Value Date    HGBA1C 5.1 04/05/2018       Nutritional status:   Body mass index is 25.83 kg/m².  Lab Results   Component Value Date    ALBUMIN 1.9 (L) 04/24/2018    ALBUMIN 2.1 (L) 04/23/2018    ALBUMIN 2.1 (L) 04/22/2018     Lab Results   Component Value Date    PREALBUMIN 13 (L) 04/08/2018    PREALBUMIN 5 (L) 03/15/2018    PREALBUMIN 18 (L) 03/24/2015       Estimated Creatinine Clearance: 36.7 mL/min (A) (based on SCr of 2.4 mg/dL (H)).    Accu-Checks  Recent Labs      04/22/18   2045  04/22/18   2359  04/23/18   0319  04/23/18   0740  04/23/18   1201  04/23/18   1624  04/23/18   2058  04/24/18   0055  04/24/18   0458  04/24/18   0915   POCTGLUCOSE  186*  184*  205*  236*  232*  228*  288*  251*  283*  232*       Current Medications and/or Treatments Impacting Glycemic Control  Immunotherapy:  Immunosuppressants         Stop Route Frequency     tacrolimus capsule 1 mg      -- SL 2 times daily        Steroids:   Hormones     Start     Stop Route Frequency Ordered    04/19/18 0900  predniSONE tablet 5 mg      -- PER G TUBE Daily " 04/18/18 1106        Pressors:    Autonomic Drugs     Start     Stop Route Frequency Ordered    04/13/18 0859  midodrine tablet 10 mg      -- Oral As needed (PRN) 04/13/18 0800    04/09/18 1500  midodrine tablet 2.5 mg      -- Oral 3 times daily 04/09/18 1006        Hyperglycemia/Diabetes Medications: Antihyperglycemics     Start     Stop Route Frequency Ordered    04/11/18 0015  insulin regular (Humulin R) 100 Units in sodium chloride 0.9% 100 mL infusion     Question:  Insulin Rate Adjustment (DO NOT MODIFY ANSWER)  Answer:  [email or web address]    -- IV Continuous 04/10/18 2313    04/10/18 2221  insulin aspart U-100 pen 0-4 Units      -- SubQ Every 4 hours PRN 04/10/18 2122

## 2018-04-24 NOTE — PT/OT/SLP PROGRESS
"Speech Language Pathology Treatment    Patient Name:  Lv Crocker   MRN:  [id number]  Admitting Diagnosis: Acute renal failure with acute tubular necrosis superimposed on stage 3 chronic kidney disease    Recommendations:                 General Recommendations:  Dysphagia therapy, Cognitive-linguistic therapy, Modified barium swallow study and One Way Speaking Valve  Diet recommendations:  NPO, Liquid Diet Level: NPO   Aspiration Precautions: Continue alternate means of nutrition and Strict aspiration precautions   General Precautions: Standard, aspiration, fall  Communication strategies:  One way speaking valve and go to room if call light pushed   PMSV Precautions: Only when Supervised, Do not sleep with valve on, Wash with soap and water      Subjective     SLP reviewed Pt with nurse, nurse at bedside feeding Pt lunch meal tray explains, "They wanted us to see if having a diet tray would help him eat more so we ordered a regular diet for him"  Pt presents calm  He says, "More meat"   He reports chest (upper) pain, nurse aware of pt c/o pain    Pain/Comfort:  · Pain Rating 1: 0/10  · Pain Rating Post-Intervention 1: 0/10    Objective:     Has the patient been evaluated by SLP for swallowing?   Yes  Keep patient NPO? Yes   Current Respiratory Status: trach cuffless      Pt seen for ongoing swallow assessment and PMSV training. Pt found awake and alert, sitting up in chair, with nurse and spouse at bedside. Pt donning PMSV.  Pt with mildly hoarse vocal quality while donning valve. Pt with adequate throat clear and cough on command. Pt reports he has been wearing valve "most of the day." SaO2 at 96%. Pt accepts trials of thin liquid (ice chip x2, cup edge sip x2.) Pt with overt coughing on second cup edge sip thin liquids. Additional trials held as Pt declining additional PO 2/2 fatigue. Patient and spouse educated on MBSS procedure, SLP POC and safety precautions for PMSV. Both verbalize understanding. No " further questions. Whiteboard updated.     Assessment:     Lv Crocker is a 42 y.o. male with an SLP diagnosis of Dysphagia, Cognitive-Linguistic Impairment and S/P Trach.  He presents with coughing on trials of thin liquids this service day and would benefit from further, objective assessment of swallow fx via MBS when feasible. Please order if in agreement.     Goals:    SLP Goals        Problem: SLP Goal    Goal Priority Disciplines Outcome   SLP Goal     SLP Ongoing (interventions implemented as appropriate)   Description:  Speech Language Pathology Goals  Goals expected to be met by 4/27/18  1.  Pt will tolerate PMSV for 30 minutes w/ no change in respiratory status and fair voicing produced.  2.  Pt will participate in ongoing swallow assessment   3. Pt will name up to 12 items/minute in a concrete category, 90% of attempts, Supervision, to improve cognitive organization  4. Pt will recall 3 related items post 3 minute filled delay, 90% of attempts, Supervision, to improve STM   5. Pt will answer m/u YNQ with 90% accuracy, Supervision, to improve higher-level comprehension   6. Pt will complete further assessment of reading/writing skills  7. Educate Pt on aspiration precautions and S/S aspiration  8. Educate Pt and family on PMSV precautions and safety awareness     Goals expected to be met by 4/20/18  1.  Pt will tolerate PMSV for 30 minutes w/ no change in respiratory status and fair voicing produced.  2.  Pt will complete further evaluation of cognitive-linguistic communication skills to determine the need for additional goals. Met 4/19  3. Pt will name up to 12 items/minute in a concrete category, 90% of attempts, Supervision, to improve cognitive organization  4. Pt will recall 3 related items post 3 minute filled delay, 90% of attempts, Supervision, to improve STM   5. Pt will answer m/u YNQ with 90% accuracy, Supervision, to improve higher-level comprehension   6. Pt will complete further  assessment of reading/writing skills  7. Pending MD clearance, Pt will complete further assessment of swallow fx   8. Educate Pt and family on PMSV precautions and safety awareness      Goals expected to be met by 4/13/18     1.  Pt will tolerate PMSV for 3 min w/ no change in respiratory status and fair voicing produced. Met   2.  Pt will complete further evaluation of cognitive-linguistic communication skills to determine the need for additional goals.                          Plan:     · Patient to be seen:  5 x/week   · Plan of Care expires:  05/05/18  · Plan of Care reviewed with:  patient, spouse   · SLP Follow-Up:  Yes       Discharge recommendations:  rehabilitation facility   Barriers to Discharge:  Level of Skilled Assistance Needed     Time Tracking:     SLP Treatment Date:   04/24/18  Speech Start Time:  1604  Speech Stop Time:  1614     Speech Total Time (min):  10 min    Billable Minutes: Treatment Swallowing Dysfunction 10    RAO Delarosa, [facility]-SLP  Speech-Language Pathology  Pager: [phone]    04/24/2018

## 2018-04-24 NOTE — SUBJECTIVE & OBJECTIVE
Interval History 4/24/2018:  Patient is seen for follow-up rehab evaluation and recommendations: No acute events over night.  No therapy yesterday HD.  Didn't sleep well last night.  Barriers for discharge/rehab admission: outpatient HD chair, insulin gtt    HPI, Past Medical, Family, and Social History remains the same as documented in the initial encounter.    Scheduled Medications:    sodium chloride 0.9%   Intravenous Once    acetaminophen  999.0148 mg Per G Tube TID    cetirizine  5 mg Per G Tube Daily    chlorhexidine  15 mL Mouth/Throat BID    epoetin jose miguel (PROCRIT) injection  4,000 Units Intravenous Every Mon, Wed, Fri    escitalopram oxalate  20 mg Per G Tube Daily    famotidine  20 mg Per G Tube QHS    heparin (porcine)  5,000 Units Subcutaneous Q12H    levothyroxine  75 mcg Intravenous Daily    midodrine  2.5 mg Oral TID    polyethylene glycol  17 g Oral Daily    predniSONE  5 mg Per G Tube Daily    QUEtiapine  50 mg Oral QHS    sennosides 8.8 mg/5 ml  5 mL Oral QHS    tacrolimus  1 mg Sublingual BID     PRN Medications: sodium chloride 0.9%, heparin (porcine), insulin aspart U-100, midodrine, ondansetron, oxyCODONE, povidone-iodine, ramelteon    Review of Systems   Constitutional: Positive for activity change. Negative for chills, fatigue and fever.   HENT: Negative for drooling, hearing loss, trouble swallowing and voice change.    Eyes: Negative for pain and visual disturbance.   Respiratory: Negative for cough and wheezing.         + trach   Cardiovascular: Negative for chest pain and palpitations.   Gastrointestinal: Negative for abdominal pain, nausea and vomiting.        + PEG   Genitourinary: Negative for difficulty urinating and flank pain.   Musculoskeletal: Positive for gait problem and myalgias. Negative for arthralgias, back pain and neck pain.   Skin: Positive for color change and wound. Negative for rash.   Allergic/Immunologic: Positive for immunocompromised state.    Neurological: Positive for weakness. Negative for dizziness, numbness and headaches.   Psychiatric/Behavioral: Positive for hallucinations (improving) and sleep disturbance (improving). Negative for agitation. The patient is not nervous/anxious.      Objective:     Vital Signs (Most Recent):  Temp: 98.3 °F (36.8 °C) (18 07)  Pulse: (!) 112 (18 0730)  Resp: 20 (18 07)  BP: (!) 94/53 (18 07)  SpO2: 95 % (95) (18 0800)    Vital Signs (24h Range):  Temp:  [97.8 °F (36.6 °C)-99 °F (37.2 °C)] 98.3 °F (36.8 °C)  Pulse:  [104-115] 112  Resp:  [16-20] 20  SpO2:  [93 %-97 %] 95 %  BP: ()/(51-72) 94/53     Physical Exam   Constitutional: He is oriented to person, place, and time. He appears well-developed. He appears ill. No distress.   HENT:   Head: Normocephalic and atraumatic.   Right Ear: External ear normal.   Left Ear: External ear normal.   Nose: Nose normal.   Eyes: Right eye exhibits no discharge. Left eye exhibits no discharge. No scleral icterus.   Neck: Neck supple.   Trach intact.    Cardiovascular: Normal rate, regular rhythm and intact distal pulses.    Pulmonary/Chest: Effort normal. No respiratory distress. He has no wheezes.   Abdominal: Soft. He exhibits no distension. There is no tenderness.   PEG intact, incision LETICIA, CDI   Musculoskeletal: He exhibits no edema or tenderness.   R foot: R great toe and R 3rd toe with necrotic tissue  L foot: small amount of necrotic tissue to L great toe  Overall deconditioning   Neurological: He is alert and oriented to person, place, and time.   -  Mental Status:  AAOx3.  Follows commands.  Answers correct age and .  Recent and remote memory intact.  -  Speech and language:  no aphasia or dysarthria.    -  Motor:  RUE: 2/5, 3/5 .  LUE: 2+/5, 3/5 .  RLE: 2/5, DF 3+/5, PF 3+/5.  LLE: 2/5, DF 2/5, PF 3+/5.  -  Sensory:  Intact to light touch and pin prick.   Skin: Skin is warm and dry. No rash noted.   Psychiatric: His  behavior is normal. Thought content normal. His affect is blunt.   Vitals reviewed.    Diagnostic Results:   Labs: Reviewed  X-Ray: Reviewed  US: Reviewed  CT: Reviewed  NEUROLOGICAL EXAMINATION:     MENTAL STATUS   Oriented to person, place, and time.

## 2018-04-24 NOTE — NURSING
On call notified of pt reporting R calf pain. No warmth or redness noted and calves equal in girth. Ordered to administer scheduled Tylenol and continue to monitor.

## 2018-04-24 NOTE — PLAN OF CARE
Problem: SLP Goal  Goal: SLP Goal  Speech Language Pathology Goals  Goals expected to be met by 4/27/18  1.  Pt will tolerate PMSV for 30 minutes w/ no change in respiratory status and fair voicing produced.  2.  Pt will participate in ongoing swallow assessment   3. Pt will name up to 12 items/minute in a concrete category, 90% of attempts, Supervision, to improve cognitive organization  4. Pt will recall 3 related items post 3 minute filled delay, 90% of attempts, Supervision, to improve STM   5. Pt will answer m/u YNQ with 90% accuracy, Supervision, to improve higher-level comprehension   6. Pt will complete further assessment of reading/writing skills  7. Educate Pt on aspiration precautions and S/S aspiration  8. Educate Pt and family on PMSV precautions and safety awareness     Goals expected to be met by 4/20/18  1.  Pt will tolerate PMSV for 30 minutes w/ no change in respiratory status and fair voicing produced.  2.  Pt will complete further evaluation of cognitive-linguistic communication skills to determine the need for additional goals. Met 4/19  3. Pt will name up to 12 items/minute in a concrete category, 90% of attempts, Supervision, to improve cognitive organization  4. Pt will recall 3 related items post 3 minute filled delay, 90% of attempts, Supervision, to improve STM   5. Pt will answer m/u YNQ with 90% accuracy, Supervision, to improve higher-level comprehension   6. Pt will complete further assessment of reading/writing skills  7. Pending MD clearance, Pt will complete further assessment of swallow fx   8. Educate Pt and family on PMSV precautions and safety awareness      Goals expected to be met by 4/13/18     1.  Pt will tolerate PMSV for 3 min w/ no change in respiratory status and fair voicing produced. Met   2.  Pt will complete further evaluation of cognitive-linguistic communication skills to determine the need for additional goals.          Outcome: Ongoing (interventions implemented  as appropriate)  Pt tolerating PMSV w/o overt S/S distress. Patient with coughing on trials of thin liquids today. REC; further, objective assessment of swallow fx via MBSS when feasible. Please order if in agreement. Thank you.    JOSELYN Delarosa., [facility]-SLP  Speech-Language Pathology  Pager: [phone]  4/24/2018

## 2018-04-24 NOTE — PROGRESS NOTES
[facility]  Physical Medicine & Rehab  Progress Note    Patient Name: Lv Crocker  MRN: [id number]  Admission Date: 3/11/2018  Length of Stay: 43 days  Attending Physician: Behzad Lara Jr.,*    Subjective:     Principal Problem:Acute renal failure with acute tubular necrosis superimposed on stage 3 chronic kidney disease    Hospital Course:   3/24/18:  Evaluated by PT and OT.  Bed mobility and transfers deferred 2/2 intubation and CRRT.   3/26/18:  Participated with PT.  Bed mobility totalA-dependent.  EOB totalA.  3/28/18:  Participated with PT and OT.  Bed mobility totalA-dependent.  ADLs totalA.   4/16/18:  Participated with PT and OT.  Bed mobility max-totalA/dependent.  Sit to stand totalA and transfers totalA.  EOB mod-totalA.  UBD totalA and LBD totalA.  4/17/18:  Participated with PT and SLP.  Bed mobility total-dependent.  EOB x 15 minutes mod-maxA.   4/18/18:  Participated with OT.  Bed mobility totalA.  EOB x 15 minutes totalA.    4/19/18:  Participated with PT, OT, and SLP.  Found to have cognitive-linguistic impairment and dysphonia.  Remains NPO.  Bed mobility total/dependent.  EOB x 24 minutes (static sit x ~10 seconds + ~5 seconds SV) mod-maxA.  No sit to stand, transfers, or gait.  ADLs totalA.   4/22/18:  Participated with PT and OT.  Bed mobility totalA.  EOB maxA.  Sit to stand max-totalA and transfers max-totalA.  LBD maxA.  4/23/18:  No PT or OT 2/2 HD.     Interval History 4/24/2018:  Patient is seen for follow-up rehab evaluation and recommendations: No acute events over night.  No therapy yesterday HD.  Didn't sleep well last night.  Barriers for discharge/rehab admission: outpatient HD chair, insulin gtt    HPI, Past Medical, Family, and Social History remains the same as documented in the initial encounter.    Scheduled Medications:    sodium chloride 0.9%   Intravenous Once    acetaminophen  999.0148 mg Per G Tube TID    cetirizine  5 mg Per G Tube  Daily    chlorhexidine  15 mL Mouth/Throat BID    epoetin jose miguel (PROCRIT) injection  4,000 Units Intravenous Every Mon, Wed, Fri    escitalopram oxalate  20 mg Per G Tube Daily    famotidine  20 mg Per G Tube QHS    heparin (porcine)  5,000 Units Subcutaneous Q12H    levothyroxine  75 mcg Intravenous Daily    midodrine  2.5 mg Oral TID    polyethylene glycol  17 g Oral Daily    predniSONE  5 mg Per G Tube Daily    QUEtiapine  50 mg Oral QHS    sennosides 8.8 mg/5 ml  5 mL Oral QHS    tacrolimus  1 mg Sublingual BID     PRN Medications: sodium chloride 0.9%, heparin (porcine), insulin aspart U-100, midodrine, ondansetron, oxyCODONE, povidone-iodine, ramelteon    Review of Systems   Constitutional: Positive for activity change. Negative for chills, fatigue and fever.   HENT: Negative for drooling, hearing loss, trouble swallowing and voice change.    Eyes: Negative for pain and visual disturbance.   Respiratory: Negative for cough and wheezing.         + trach   Cardiovascular: Negative for chest pain and palpitations.   Gastrointestinal: Negative for abdominal pain, nausea and vomiting.        + PEG   Genitourinary: Negative for difficulty urinating and flank pain.   Musculoskeletal: Positive for gait problem and myalgias. Negative for arthralgias, back pain and neck pain.   Skin: Positive for color change and wound. Negative for rash.   Allergic/Immunologic: Positive for immunocompromised state.   Neurological: Positive for weakness. Negative for dizziness, numbness and headaches.   Psychiatric/Behavioral: Positive for hallucinations (improving) and sleep disturbance (improving). Negative for agitation. The patient is not nervous/anxious.      Objective:     Vital Signs (Most Recent):  Temp: 98.3 °F (36.8 °C) (04/24/18 0730)  Pulse: (!) 112 (04/24/18 0730)  Resp: 20 (04/24/18 0730)  BP: (!) 94/53 (04/24/18 0730)  SpO2: 95 % (95) (04/24/18 0800)    Vital Signs (24h Range):  Temp:  [97.8 °F (36.6 °C)-99 °F  "(37.2 °C)] 98.3 °F (36.8 °C)  Pulse:  [104-115] 112  Resp:  [16-20] 20  SpO2:  [93 %-97 %] 95 %  BP: ()/(51-72) 94/53     Physical Exam   Constitutional: He is oriented to person, place, and time. He appears well-developed. He appears ill. No distress.   HENT:   Head: Normocephalic and atraumatic.   Right Ear: External ear normal.   Left Ear: External ear normal.   Nose: Nose normal.   Eyes: Right eye exhibits no discharge. Left eye exhibits no discharge. No scleral icterus.   Neck: Neck supple.   Trach intact.    Cardiovascular: Normal rate, regular rhythm and intact distal pulses.    Pulmonary/Chest: Effort normal. No respiratory distress. He has no wheezes.   Abdominal: Soft. He exhibits no distension. There is no tenderness.   PEG intact, incision LETICIA, CDI   Musculoskeletal: He exhibits no edema or tenderness.   R foot: R great toe and R 3rd toe with necrotic tissue  L foot: small amount of necrotic tissue to L great toe  Overall deconditioning   Neurological: He is alert and oriented to person, place, and time.   -  Mental Status:  AAOx3.  Follows commands.  Answers correct age and .  Recent and remote memory intact.  -  Speech and language:  no aphasia or dysarthria.    -  Motor:  RUE: 2/5, 3/5 .  LUE: 2+/5, 3/5 .  RLE: 2/5, DF 3+/5, PF 3+/5.  LLE: 2/5, DF 2/5, PF 3+/5.  -  Sensory:  Intact to light touch and pin prick.   Skin: Skin is warm and dry. No rash noted.   Psychiatric: His behavior is normal. Thought content normal. His affect is blunt.   Vitals reviewed.    Diagnostic Results:   Labs: Reviewed  X-Ray: Reviewed  US: Reviewed  CT: Reviewed    Assessment/Plan:      * Acute renal failure with acute tubular necrosis superimposed on stage 3 chronic kidney disease    -  Nephrology following "anuric CACHORRO; likely ischemic ATN 2/2 prolonged pre-renal state (diarrhea) in setting of prograf renal vasoconstriction; cannot r/o septic ATN or Prograf toxicity"  -  On HD  -  S/p perm-a-cath placement " on 4/4/18        Gangrene    -  Stable gangrene to right great toe and 3rd toe. Ischemic changes to right second toe and left hallux  -  Wound care following  -  Podiatry following         Severe malnutrition    -  S/p PEG placement on 4/4/18  -  On continuous TF        Acute respiratory failure with hypoxia    -  Intubated on 3/14/18--> s/p trach 3/28/18--> now on RA  -  Completed 7 day course of Meropenem/Linezolid   -  RSV positive early- completed course of Ribavarin/IVIG        Type 1 diabetes mellitus with stage 3 chronic kidney disease    -  Endocrine following  -  On insulin gtt        Heart transplanted    -  S/p OHTX 11/18/2014 with early post-op course complicated by hemorrhagic tamponade s/p  washout, delayed closure, pericardial window with subsequent a-fib with RVR and CACHORRO and late course complicated by ABMR (in 4/2015 s/p rituxan, PLEX, and IVIG), C.diff/CMV reactivation, CKD, and restrictive cardiomyopathy with subsequent chronic/recurrent pleural effusions and ascites previously requiring monthly paracentesis and thoracentesis until he underwent VATS with pleurX catheter placement on 1/11/2018 with removal on 2/7/2018        Debility    -  2/2 prolonged and acute hospital course  -  (I) ADLs and mobility prior to admission, limited by fatigue/endurance  Recommendations  -  Encourage mobility, OOB in chair, and early ambulation as appropriate  -  PT/OT evaluate and treat  -  Pain management  -  Monitor for and prevent skin breakdown and pressure ulcers  · Early mobility, repositioning/weight shifting every 20-30 minutes when sitting, turn patient every 2 hours, proper mattress/overlay and chair cushioning, pressure relief/heel protector boots  -  DVT prophylaxis:  MARK, SCD        Anxiety    -  Anxiety and sleeping difficulty   -  Psych consulted--> increased Lexapro to 20 mg, started Seroquel--> stopped xanax and nortriptyline 2/2 hallucinations        Patient approved for [facility].    Rehab admission pending insulin gtt (endocrine plan to transition off gtt) and outpatient HD chair set-up.  Will continue to follow.     RENETTA Caldwell  Department of Physical Medicine & Rehab   [facility]

## 2018-04-24 NOTE — PROGRESS NOTES
"[facility]  Endocrinology  Progress Note    Admit Date: 3/11/2018     Reason for Consult: abnormal TFTs    Surgical Procedure and Date: s/p heart transplant 2014     Diabetes diagnosis year: 7yo (T1DM)     Home Diabetes Medications:    Levemir 15u qHS  Novolog 4u AC     How often checking glucose at home? 4 times daily   BG readings on regimen: 150-200s  Hypoglycemia on the regimen?  Yes, rarely - treats appropriately (light snack, glucose tab)  Missed doses on regimen?  No        Diabetes Complications include:     Hyperglycemia, Hypoglycemia  and Diabetic chronic kidney disease          Complicating diabetes co morbidities:   N/A     HPI:   Patient is a 42 y.o. male with a diagnosis of T1DM, HTN, hypothyroidism, s/p heart transplant.  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 He required serial paracentesis and thoracentesis until he underwent a VATS with pleurX catheter placement on 1/11/2018 and removed 2/7/18.       Presented to hospital secondary to diarrhea and cough.  Upon initial labs, TSH was decreased (0.101) and free T4 1.33.  Endocrinology consulted to assist in management of these labs.  He was last seen in clinic by Kamala Vázquez NP 11/2017.   Previous hospitalization, endocrine consulted for same problem.  During that visit, recommended decreasing LT4 to 125mcg daily. Unfortunately, patient was discharged on previous dose of 150mcg daily.     Interval HPI:   Overnight events:  BG above goal on transition 0.8, TF infusing at 40cc/hr, remains NPO.   Trach.   On CRRT  On PDN 5mg daily   Low normal BP noted   Remains IV LT4 75mcg daily    BP (!) 94/53 (BP Location: Right arm, Patient Position: Lying)   Pulse (!) 112   Temp 98.3 °F (36.8 °C) (Oral)   Resp 20   Ht 5' 7" (1.702 m)   Wt 74.8 kg (164 lb 14.5 oz)   SpO2 95% Comment: 95  BMI 25.83 kg/m²       Labs Reviewed and " Include      Recent Labs  Lab 04/24/18  0532   *   CALCIUM 9.2   ALBUMIN 1.9*   PROT 5.7*   *   K 3.4*   CO2 23   CL 96   BUN 33*   CREATININE 2.4*   ALKPHOS 203*   ALT 9*   AST 13   BILITOT 0.3     Lab Results   Component Value Date    WBC 3.06 (L) 04/24/2018    HGB 7.5 (L) 04/24/2018    HCT 24.2 (L) 04/24/2018     (H) 04/24/2018     04/24/2018       Recent Labs  Lab 04/21/18  0334   TSH 7.085*   FREET4 0.87     Lab Results   Component Value Date    HGBA1C 5.1 04/05/2018       Nutritional status:   Body mass index is 25.83 kg/m².  Lab Results   Component Value Date    ALBUMIN 1.9 (L) 04/24/2018    ALBUMIN 2.1 (L) 04/23/2018    ALBUMIN 2.1 (L) 04/22/2018     Lab Results   Component Value Date    PREALBUMIN 13 (L) 04/08/2018    PREALBUMIN 5 (L) 03/15/2018    PREALBUMIN 18 (L) 03/24/2015       Estimated Creatinine Clearance: 36.7 mL/min (A) (based on SCr of 2.4 mg/dL (H)).    Accu-Checks  Recent Labs      04/22/18   2045  04/22/18   2359  04/23/18   0319  04/23/18   0740  04/23/18   1201  04/23/18   1624  04/23/18   2058  04/24/18   0055  04/24/18   0458  04/24/18   0915   POCTGLUCOSE  186*  184*  205*  236*  232*  228*  288*  251*  283*  232*       Current Medications and/or Treatments Impacting Glycemic Control  Immunotherapy:  Immunosuppressants         Stop Route Frequency     tacrolimus capsule 1 mg      -- SL 2 times daily        Steroids:   Hormones     Start     Stop Route Frequency Ordered    04/19/18 0900  predniSONE tablet 5 mg      -- PER G TUBE Daily 04/18/18 1106        Pressors:    Autonomic Drugs     Start     Stop Route Frequency Ordered    04/13/18 0859  midodrine tablet 10 mg      -- Oral As needed (PRN) 04/13/18 0800    04/09/18 1500  midodrine tablet 2.5 mg      -- Oral 3 times daily 04/09/18 1006        Hyperglycemia/Diabetes Medications: Antihyperglycemics     Start     Stop Route Frequency Ordered    04/11/18 0015  insulin regular (Humulin R) 100 Units in sodium  chloride 0.9% 100 mL infusion     Question:  Insulin Rate Adjustment (DO NOT MODIFY ANSWER)  Answer:  [email or web address]    -- IV Continuous 04/10/18 2313    04/10/18 2221  insulin aspart U-100 pen 0-4 Units      -- SubQ Every 4 hours PRN 04/10/18 2122          ASSESSMENT and PLAN    Type 1 diabetes mellitus with stage 3 chronic kidney disease    BG goal 140-180,       Increase transition to 1u/hr, low correction q4hr checks     Pt is T1DM, do not suspend insulin >1 hr   If TF held, decrease insulin rate to 0.2u/hr - known to have controlled BG on basal needs of lev 5 daily/ 0.2u/hr during this hospitalization        Discharge Recommendations:  TBD.          On enteral nutrition     TF at goal May increase insulin needs             Current chronic use of systemic steroids     Can increase insulin requirement          Acute respiratory failure with hypoxia     Per primary  S/p trach.  Practicing with speech valve.  Remains NPO          Hypothyroidism due to Hashimoto's thyroiditis     continue iv levothyroxine 75mcg daily  tsh improving, monitor q1-2 weeks         Palmira Dorsey MD  Endocrinology  [facility]IA, Betzaida Hurtado MD,  have personally taken the history and examined the patient and agree with the resident's note as stated above.

## 2018-04-24 NOTE — PLAN OF CARE
Problem: Physical Therapy Goal  Goal: Physical Therapy Goal  Goals to be met by: 18     Patient will increase functional independence with mobility by performin. Supine to sit with Moderate Assistance.  2. Sit to supine with Moderate Assistance.  3. Rolling to Left and Right with Minimal Assistance.   4. Sit to stand transfer with Moderate Assistance.  5. Bed to chair transfer with Maximum Assistance.  6. Gait  x 5 feet with Minimal Assistance.   7.  Pt to perf and be compliant with LE HEP to improve vascularity and mm strength.          Outcome: Ongoing (interventions implemented as appropriate)  Progressing    Corey Hernández DPT  2018

## 2018-04-24 NOTE — PLAN OF CARE
Problem: Patient Care Overview  Goal: Plan of Care Review  Outcome: Ongoing (interventions implemented as appropriate)  Pt AAAOx4. Pt voices no complaints of pain today.  Pt vital signs remained stable for pt today.  Pt blood sugars were 230-248.  Increased Pt insulin drip to 1unit/hr.  Pt tolerating well. Pt went outside in cardiac chair with Kamini with Heart Transplant Service and Negar Salas RN.  Pt remained free from falls or injuries during shift. Pt vital signs and labs  Remained stable for pt.  Pt turned and repositioned q 2 hours or more frequently as requested.  NADN. Will continue to monitor.

## 2018-04-24 NOTE — PROGRESS NOTES
Following for skin concerns  Bilateral achilles DTIs have resolved and the DTI to left buttock is almost resolved. Barrier creams have been in use. Ischemic changes to toes are more demarcated and right toes have presented with eschar.      04/24/18 1330       Wound 04/06/18 1700  distal Toe, third   Date First Assessed/Time First Assessed: 04/06/18 1700   Pre-existing: No  Wound Type: (c)   Side: Right  Orientation: distal  Location: (c) Toe, third   Wound Image    Wound WDL ex   Dressing Appearance Open to air;No dressing   Drainage Amount None   Appearance Black;Eschar;Necrotic   Black (%), Wound Tissue Color 100 %   Periwound Area Intact   Care Applied:;Povidone iodine       Wound 04/17/18 1200 Toe, first   Date First Assessed/Time First Assessed: 04/17/18 1200   Side: Left  Location: Toe, first   Wound WDL ex   Dressing Appearance Open to air;No dressing   Drainage Amount None   Appearance Black;Dry   Periwound Area Intact   Care Applied:;Povidone iodine       Pressure Injury 04/17/18 1200 Left Buttocks Deep tissue injury   Date First Assessed/Time First Assessed: 04/17/18 1200   Pressure Injury Present on Admission: no  Side: Left  Location: Buttocks  Staging: Deep tissue injury   Staging D  (resolving)   Dressing Appearance Open to air;No dressing  (barrier creams)   Drainage Amount None   Appearance Pink;Maroon  (discoloration much lighter)   Periwound Area Intact   Care Cleansed with:;Sterile normal saline;Applied:;Skin Barrier     Continue present treatment  Dc Garcia RN CWON  [id number]

## 2018-04-24 NOTE — PT/OT/SLP PROGRESS
Physical Therapy Treatment    Patient Name:  Lv Crocker   MRN:  [id number]    Recommendations:     Discharge Recommendations:  rehabilitation facility   Discharge Equipment Recommendations:     Barriers to discharge: Inaccessible home and Decreased caregiver support    Assessment:     Lv Crocker is a 42 y.o. male admitted with a medical diagnosis of Acute renal failure with acute tubular necrosis superimposed on stage 3 chronic kidney disease.  He presents with the following impairments/functional limitations:  weakness, impaired functional mobilty, gait instability, impaired endurance, impaired balance, impaired self care skills, decreased lower extremity function, decreased upper extremity function, impaired cardiopulmonary response to activity, impaired skin, pain, impaired fine motor, decreased ROM.  Pt tolerated treatment session c mod c/o fatigue and weakness.  Pt requiring max A for functional mobility.  Pt continues to demo poor trunk control in static sitting EOB requiring max A to maintain upright.  Pt transferred to cardiac chair and encouraged to stay up in chair for as long as possible.  Pt placed in cardiac chair at 1030 and notified by RN that pt returned to bed ~1600 and was assisted outside in cardiac chair c SANFORD and demario from Heart transplant service.  Pt would benefit from continued skilled acute PT 5x/wk to improve functional mobility.      Rehab Prognosis:  fair; patient would benefit from acute skilled PT services to address these deficits and reach maximum level of function.      Recent Surgery: Procedure(s) (LRB):  INSERTION-CATHETER-PERM-A-CATH (Left)  INSERTION-TUBE-GASTROSTOMY (N/A) 20 Days Post-Op    Plan:     During this hospitalization, patient to be seen 5 x/week to address the above listed problems via gait training, therapeutic activities, therapeutic exercises, neuromuscular re-education  · Plan of Care Expires:  05/06/18   Plan of Care Reviewed with: patient,  "spouse    Subjective     Communicated with RN and OT prior to session.  Patient found HOB elevated and wife present upon PT entry to room, agreeable to treatment.      Chief Complaint: fatigue  Patient comments/goals: "I'd probably only be able to stay in [cardiac chair] for about 45 minutes."  Pain/Comfort:  · Pain Rating 1: 0/10    Patients cultural, spiritual, Caodaism conflicts given the current situation: none    Objective:     Patient found with: telemetry, peripheral IV, tracheostomy, PEG Tube     General Precautions: Standard, fall, aspiration   Orthopedic Precautions:N/A   Braces: N/A     Functional Mobility:  · Bed Mobility:     · Rolling Left:  maximal assistance  · Scooting: maximal assistance  · Supine to Sit: maximal assistance  · Transfers:     · Sit to Stand:  maximal assistance with no AD  · Bed to Chair: maximal assistance with  no AD  using  Stand Pivot  · Balance: static sitting (max A)      AM-PAC 6 CLICK MOBILITY  Turning over in bed (including adjusting bedclothes, sheets and blankets)?: 2  Sitting down on and standing up from a chair with arms (e.g., wheelchair, bedside commode, etc.): 2  Moving from lying on back to sitting on the side of the bed?: 2  Moving to and from a bed to a chair (including a wheelchair)?: 2  Need to walk in hospital room?: 1  Climbing 3-5 steps with a railing?: 1  Total Score: 10       Therapeutic Activities and Exercises:  Educated on PT role/POC; benefits of OOB activities; sitting up in cardiac chair as long as possible; performing BLE/BUE exercise throughout day to improve strength - v/u  Sat EOB x15 mins max A  Therex (hip flex, AP, LAQ) 2x10 ea  Transferred to cardiac chair c max A      Patient left up in chair with all lines intact, call button in reach, RN notified and wife present..    GOALS:    Physical Therapy Goals        Problem: Physical Therapy Goal    Goal Priority Disciplines Outcome Goal Variances Interventions   Physical Therapy Goal     PT/OT, " PT Ongoing (interventions implemented as appropriate)     Description:  Goals to be met by: 18     Patient will increase functional independence with mobility by performin. Supine to sit with Moderate Assistance.  2. Sit to supine with Moderate Assistance.  3. Rolling to Left and Right with Minimal Assistance.   4. Sit to stand transfer with Moderate Assistance.  5. Bed to chair transfer with Maximum Assistance.  6. Gait  x 5 feet with Minimal Assistance.   7.  Pt to perf and be compliant with LE HEP to improve vascularity and mm strength.                           Time Tracking:     PT Received On: 18  PT Start Time: 1000     PT Stop Time: 1038  PT Total Time (min): 38 min     Billable Minutes: Therapeutic Activity 28 min and Therapeutic Exercise 10 min    Treatment Type: Treatment  PT/PTA: PT     PTA Visit Number: 0     Corey Hernández, PT  2018

## 2018-04-24 NOTE — ASSESSMENT & PLAN NOTE
- Continue PTA  Escitalopram, will stop xanex and nortriptyline due to hallucinations and give sleep med and less pain meds.  - Increased Lexapro to 20mg daily per psych recs  - started Seroquel as per psych rec.

## 2018-04-24 NOTE — SUBJECTIVE & OBJECTIVE
Interval History: Tolerated HD yesterday. Remain anuric. Plan for inpatient rehab once off insulin gtt.     Review of patient's allergies indicates:   Allergen Reactions    No known drug allergies      Current Facility-Administered Medications   Medication Frequency    0.9%  NaCl infusion Once    0.9%  NaCl infusion PRN    acetaminophen oral solution 999.0148 mg TID    cetirizine tablet 5 mg Daily    chlorhexidine 0.12 % solution 15 mL BID    epoetin jose miguel injection 4,000 Units Every Mon, Wed, Fri    escitalopram oxalate tablet 20 mg Daily    famotidine tablet 20 mg QHS    heparin (porcine) injection 1,000 Units PRN    heparin (porcine) injection 5,000 Units Q12H    insulin aspart U-100 pen 0-4 Units Q4H PRN    insulin regular (Humulin R) 100 Units in sodium chloride 0.9% 100 mL infusion Continuous    levothyroxine injection 75 mcg Daily    midodrine tablet 10 mg PRN    midodrine tablet 2.5 mg TID    ondansetron disintegrating tablet 4 mg Q6H PRN    oxyCODONE 5 mg/5 mL solution 5 mg Q6H PRN    polyethylene glycol packet 17 g BID    povidone-iodine 10 % ointment PRN    predniSONE tablet 5 mg Daily    QUEtiapine tablet 50 mg QHS    ramelteon tablet 8 mg Nightly PRN    tacrolimus capsule 1 mg BID       Objective:     Vital Signs (Most Recent):  Temp: 98.1 °F (36.7 °C) (04/24/18 1141)  Pulse: 108 (04/24/18 1142)  Resp: 18 (04/24/18 1141)  BP: (!) 93/57 (04/24/18 1141)  SpO2: 95 % (04/24/18 1141)  O2 Device (Oxygen Therapy): room air (04/24/18 1141) Vital Signs (24h Range):  Temp:  [98.1 °F (36.7 °C)-99 °F (37.2 °C)] 98.1 °F (36.7 °C)  Pulse:  [108-115] 108  Resp:  [18-20] 18  SpO2:  [93 %-96 %] 95 %  BP: ()/(51-60) 93/57     Weight: 74.8 kg (164 lb 14.5 oz) (04/20/18 1100)  Body mass index is 25.83 kg/m².  Body surface area is 1.88 meters squared.    I/O last 3 completed shifts:  In: 1579.6 [I.V.:39.6; Other:600; NG/GT:940]  Out: 1081 [Other:1081]    Physical Exam   Constitutional: He  appears well-developed. No distress.   Trach   HENT:   Head: Normocephalic and atraumatic.   Eyes: Conjunctivae and EOM are normal.   Cardiovascular: Regular rhythm.  Tachycardia present.    Pulmonary/Chest: He has no wheezes. He has no rales.   Abdominal: Soft. He exhibits no distension.   Musculoskeletal: He exhibits no edema, tenderness or deformity.   Neurological: He is alert.   Skin: Skin is warm and dry. He is not diaphoretic.       Significant Labs:  All labs within the past 24 hours have been reviewed.     Significant Imaging:  Labs: Reviewed

## 2018-04-24 NOTE — ASSESSMENT & PLAN NOTE
- TTE 3/26/18 showed normal graft function but has known history of restrictive CM post transplant.   - Repeat echo today pending.  - Continue pred 5  - Tacro- current dose is 1/1 SL daily, adjust as needed for goal 6-10  - Cellcept remains on hold due to leukopenia

## 2018-04-24 NOTE — ASSESSMENT & PLAN NOTE
BG goal 140-180,       Increase transition to 1u/hr, low correction q4hr checks     Pt is T1DM, do not suspend insulin >1 hr   If TF held, decrease insulin rate to 0.2u/hr - known to have controlled BG on basal needs of lev 5 daily/ 0.2u/hr during this hospitalization        Discharge Recommendations:  TBD.

## 2018-04-24 NOTE — ASSESSMENT & PLAN NOTE
- baseline sCr 2.6-3.0 consistent with CKD stage IV  - anuric CACHORRO; likely ischemic ATN from prolonged pre-renal state (diarrhea) in setting of prograf renal vasoconstriction; cannot r/o septic ATN or Prograf toxicity  - SLED started 3/14. TDC placed 4/4/18.   - remains anuric  -4/23 3 hour HD.  -Plan for HD tomorrow.   -Plan for discharge to [facility] inpatient rehab (opens May 1) when off insulin gtt.

## 2018-04-24 NOTE — PROGRESS NOTES
[facility]  Heart Transplant  Progress Note    Patient Name: Lv Crocker  MRN: [id number]  Admission Date: 3/11/2018  Hospital Length of Stay: 43 days  Attending Physician: Behzad Lara Jr.,*  Primary Care Provider: Philippe Mohr MD  Principal Problem:Acute renal failure with acute tubular necrosis superimposed on stage 3 chronic kidney disease    Subjective:     Interval History: No complaints overnight. Didn't sleep well but otherwise feels ok. R leg pain totally resolved. Abdominal pain nearly gone. Wife at bedside.     Continuous Infusions:   insulin (HUMAN R) infusion (adults) 1 Units/hr (04/24/18 0916)     Scheduled Meds:   sodium chloride 0.9%   Intravenous Once    acetaminophen  999.0148 mg Per G Tube TID    cetirizine  5 mg Per G Tube Daily    chlorhexidine  15 mL Mouth/Throat BID    epoetin jose miguel (PROCRIT) injection  4,000 Units Intravenous Every Mon, Wed, Fri    escitalopram oxalate  20 mg Per G Tube Daily    famotidine  20 mg Per G Tube QHS    heparin (porcine)  5,000 Units Subcutaneous Q12H    levothyroxine  75 mcg Intravenous Daily    midodrine  2.5 mg Oral TID    polyethylene glycol  17 g Oral Daily    predniSONE  5 mg Per G Tube Daily    QUEtiapine  50 mg Oral QHS    tacrolimus  1 mg Sublingual BID     PRN Meds:sodium chloride 0.9%, heparin (porcine), insulin aspart U-100, midodrine, ondansetron, oxyCODONE, povidone-iodine, ramelteon    Review of patient's allergies indicates:   Allergen Reactions    No known drug allergies      Objective:     Vital Signs (Most Recent):  Temp: 98.1 °F (36.7 °C) (04/24/18 1141)  Pulse: 108 (04/24/18 1142)  Resp: 18 (04/24/18 1141)  BP: (!) 93/57 (04/24/18 1141)  SpO2: 95 % (04/24/18 1141) Vital Signs (24h Range):  Temp:  [98.1 °F (36.7 °C)-99 °F (37.2 °C)] 98.1 °F (36.7 °C)  Pulse:  [104-115] 108  Resp:  [18-20] 18  SpO2:  [93 %-96 %] 95 %  BP: ()/(51-72) 93/57     No data found.    Body mass index is 25.83  kg/m².      Intake/Output Summary (Last 24 hours) at 04/24/18 1252  Last data filed at 04/24/18 0500   Gross per 24 hour   Intake          1337.85 ml   Output             1081 ml   Net           256.85 ml       Hemodynamic Parameters:       Physical Exam   Constitutional: He is oriented to person, place, and time.   Generalized weakness but normal ROM   Neck: No JVD present. Trache in place and well  secured  Cardiovascular: Normal rate, regular rhythm and normal heart sounds.    Pulmonary/Chest: Effort normal and breath sounds normal.   Abdominal: Soft. Bowel sounds are normal. Mild diffuse TTP with deep palpation  Musculoskeletal: Normal range of motion. He exhibits no edema or tenderness.   Neurological: He is alert and oriented to person, place, and time.   Skin: Skin is warm and dry.   Psychiatric: - able to interact and ask questions this morning  Nursing note and vitals reviewed.    Significant Labs:  CBC:    Recent Labs  Lab 04/22/18  0532 04/23/18  0615 04/24/18  0532   WBC 2.69* 2.71* 3.06*   RBC 2.60* 2.57* 2.39*   HGB 8.2* 8.0* 7.5*   HCT 26.6* 25.7* 24.2*    193 194   * 100* 101*   MCH 31.5* 31.1* 31.4*   MCHC 30.8* 31.1* 31.0*     BNP:  No results for input(s): BNP in the last 168 hours.    Invalid input(s): BNPTRIAGELBLO  CMP:    Recent Labs  Lab 04/22/18  0532 04/23/18  0615 04/24/18  0532   * 221* 261*   CALCIUM 10.6* 10.6* 9.2   ALBUMIN 2.1* 2.1* 1.9*   PROT 6.2 6.2 5.7*    134* 134*   K 4.5 4.6 3.4*   CO2 22* 18* 23    99 96   BUN 38* 50* 33*   CREATININE 2.9* 3.9* 2.4*   ALKPHOS 210* 199* 203*   ALT 11 10 9*   AST 11 13 13   BILITOT 0.4 0.3 0.3      Coagulation:   No results for input(s): PT, INR, APTT in the last 168 hours.  LDH:  No results for input(s): LDH in the last 72 hours.  Microbiology:  Microbiology Results (last 7 days)     Procedure Component Value Units Date/Time    Fungus culture [[id number]] Collected:  03/14/18 1137    Order Status:  Completed  Specimen:  Bronchial Wash from Amniotic Fluid Updated:  04/18/18 1259     Fungus (Mycology) Culture No fungus isolated after 4 weeks          I have reviewed all pertinent labs within the past 24 hours.      Estimated Creatinine Clearance: 36.7 mL/min (A) (based on SCr of 2.4 mg/dL (H)).    Diagnostic Results:      Assessment and Plan:     43 yo male S/P OHTx 11/18/2014, suspected restrictive versus constrictive CMP post-transplant as well as CKD stage IV that has resulted in ascites/pleural effusion, was getting monthly paracentesis and thoracentesis. Most recently has had ongoing issues with his lungs, had gotten 2 thoracenteses, after which he underwent VATS 01/19/18 followed by pleurex catheter placement and effusion drainage 01/11/18, subsequently had it removed 02/07/18 after drainage had decreased despite multiple attempts to drain it. Has been having significant coughing fits that result in him being near-syncopal at times, although difficult to say if he has passed out or not- patient most recently was admitted to the hospital for increased AGUILAR, coughing and pre-syncope 02/11-02/14/18 at [facility].   Patient was started on antibiotics for suspected bacterial infection, with some diarrhea, bronchitis, and possible pneumonia. Ct chest showed some pleural fluid collection and after talking with Dr. James, we arranged for him to receive a diagnostic tap with IR, from which the fluid collection demonstrated no growth or significant findings at all really. Cell counts do not appear to have been sent but will recheck. Patient states since his hospital stay, yesterday had a significant coughing fit again, after which he nearly passed out, is being seen in clinic today for this. Denies any cardiopulmonary complaints, no leg swelling, has some abdominal swelling, which is moderate for him. Denies significant shortness of breath with mild exertion, had 6MWT yesterday to see if he qualified for home O2, and his O2 sats  actually improved after recovery from where he started initially. He and his wife admit he is in bed most days, not very active.     Mr. Crocker presented to the ED 3/11/18 with approximately 3 weeks of worsening diarrhea and 2-3 days of sinus pressure, drainage, and worsened cough.  He notes that he had a coughing fit last night and passed out, coughed throughout most of the night.  This also happened on 2/25/18.  He last saw Dr Ferreira in clinic on 2/20/18 where he was taken off myfortic and switched to cellcept (he hadn't been on cellcept because of leukopenia when they tried post-transplant).  Though his diarrhea had improved after his last discharge, he states it has increased now to 15x/day, clear/yellow/green, no fevers, no exacerbating foods per say.  He was taken back off the cellcept and placed back on myfortic this past week and has not noticed immediate change in diarrhea. He also reports his baseline coughing fits havent improved and are minimally responsive to tessalon pearls.  Came on last night, he lost consciousness during a fit once which is relatively normal for him, and had two more during HPI.  He notes no sick contacts but does state he's had some URI symptoms as above.  His baseline vitals are usually -120 and BP 90s/60s-110s/70s.  He reports a history of intermittent diarrhea - did have Cdiff many years ago but this smells different.  No abdominal pain, no nausea, no vomiting.  No blood in diarrhea.  Appears last c-scope in 2015 with no evidence of infection or inflammatory processes.  He does report IBS which is different that this.       * Acute renal failure with acute tubular necrosis superimposed on stage 3 chronic kidney disease    - Appreciate Nephrology recs.   - Continue middorine for pressure support  - HD per nephrology. Goal is SLED vs evening HD to allow for more time with therapy and better sleep hygiene.         Gastroparesis    - abdominal pain improved from weekend  - BM  on 4/214, passing gas  - KUB without evidence of bowel obstructionunclear if  worsening gastroparesis or new partial obstruction  - TF restarted and @ 40 hr. Per GI recs patient should tolerate up to 400 mL residuals. Will wean narcotics. Can complete CT AP if needed however pain is mild and improving at this point  - CMV PCR pending        Alteration in skin integrity    - wound care following        Acute respiratory failure with hypoxia    - Resolved.   - S/P Bronch and intubation 3/14 now post tracheostomy due to inability to extubate  - Pulmonology signed off. Completely weaned off vent.   - RSV positive early and completed course of Ribavarin/IVIG.  - ID had been following. Completed 7 day course of Meropenem/Linezolid earlier in hospitalization  - Appreciate PT/OT/Wound care.        Restrictive cardiomyopathy    - No changes from before.   - Has known history of recurrent ascites and pleural effusions   - S/P thoracentesis X 2, followed by VATS/pleurex cath placement (January 2018) with removal of pleurex (February 2018) and 3rd thoracentesis 2/14/18 with no significant findings on fluid removal.  - repeat echo pending today        Type 1 diabetes mellitus with stage 3 chronic kidney disease    - Appreciate Endocrine's recs  - Insulin gtt per endocrine recs. Adjust as needed        Hypothyroidism due to Hashimoto's thyroiditis    - Appreciate Endocrine's recs  - Continue Levothyroxine 75mcg IV daily        Anemia    - Transfused 2 units of PRBCs on 4/11 during HD  - Nephrology has resumed ProCrit         Heart transplanted    - TTE 3/26/18 showed normal graft function but has known history of restrictive CM post transplant.   - Repeat echo today pending.  - Continue pred 5  - Tacro- current dose is 1/1 SL daily, adjust as needed for goal 6-10  - Cellcept remains on hold due to leukopenia          Debility    - PT/OT/SLP daily. Recommend rehab.  - Nutrition following.   - Continue tube feeds. Switched from  glucerna to novasource renal (start at 10 and increase to goal of 40 as tolerated)        Anxiety    - Continue PTA  Escitalopram, will stop xanex and nortriptyline due to hallucinations and give sleep med and less pain meds.  - Increased Lexapro to 20mg daily per psych recs  - started Seroquel as per psych rec.            Kelsea Ryan PA-C  Heart Transplant  [facility]

## 2018-04-24 NOTE — PROGRESS NOTES
[facility]  Nephrology  Progress Note    Patient Name: Lv Crocker  MRN: [id number]  Admission Date: 3/11/2018  Hospital Length of Stay: 43 days  Attending Provider: Behzad Lara Jr.,*   Primary Care Physician: Philippe Mohr MD  Principal Problem:Acute renal failure with acute tubular necrosis superimposed on stage 3 chronic kidney disease    Subjective:     HPI: Mr. Crocker is a 43 yo WM with T1DM, Hashimoto thyroiditis, h/o OHTx 11/2014, and CKD stage 4 who was admitted on 3/11/18 for persistent diarrhea. He reported a 3 week history of diarrhea up to 15x/day. Associated symptoms including URI symptoms, coughing fits, and coughing syncope. Prograf level was 14.3. His post-heart transplant course has been complicated by AMR 4/2015, CMV, post-transplant restrictive cardiomyopathy, recurrent pleural effusions/ascites requiring monthly thoracenteses/paracenteses, VATS 1/11/18 with Pleur-X catheter (now removed), and CKD stage 4 with baseline sCr 2.6-3.0. Baseline -120s and baseline BP 90s-110s/60-70s. Upon admission he was started on IVF and diarrhea workup was initiated. On 3/12 he was noted to have decreased UOP despite fluids; NS was increased to 100cc/hr. He was scheduled for a colonoscopy on 3/13 however procedure was cancelled due to hypoxia and fever. He was transferred to the ICU for closer monitoring. He continued to have oliguria overnight. Bladder scan revealed 200cc of urine but patient refused osullivan catheter placement. Wife reports that patient always has a hard time urinating again after osullivan catheters are placed/removed so he refuses them. He remained hypoxic despite FiO2 70% and ABG revealed combined respiratory and metabolic acidosis with pH 7.17. He was started on BiPAP as well as a bicarb infusion at 50cc/hr. ABG with mild improvement. AM labs revealed K 6.2 and he was shifted and given kayexalate.Trialysis catheter was placed this morning and he  subsequently developed hypotension and was started on levophed. He was intubated later this morning. Nephrology consulted for CACHORRO. All history obtained by primary team and chart review as patient was intubated/sedated on exam. Consent for dialysis obtained by patient's wife and placed in chart. Of note, both of patient's parents were on dialysis.     Interval History: Tolerated HD yesterday. Remain anuric. Plan for inpatient rehab once off insulin gtt.     Review of patient's allergies indicates:   Allergen Reactions    No known drug allergies      Current Facility-Administered Medications   Medication Frequency    0.9%  NaCl infusion Once    0.9%  NaCl infusion PRN    acetaminophen oral solution 999.0148 mg TID    cetirizine tablet 5 mg Daily    chlorhexidine 0.12 % solution 15 mL BID    epoetin jose miguel injection 4,000 Units Every Mon, Wed, Fri    escitalopram oxalate tablet 20 mg Daily    famotidine tablet 20 mg QHS    heparin (porcine) injection 1,000 Units PRN    heparin (porcine) injection 5,000 Units Q12H    insulin aspart U-100 pen 0-4 Units Q4H PRN    insulin regular (Humulin R) 100 Units in sodium chloride 0.9% 100 mL infusion Continuous    levothyroxine injection 75 mcg Daily    midodrine tablet 10 mg PRN    midodrine tablet 2.5 mg TID    ondansetron disintegrating tablet 4 mg Q6H PRN    oxyCODONE 5 mg/5 mL solution 5 mg Q6H PRN    polyethylene glycol packet 17 g BID    povidone-iodine 10 % ointment PRN    predniSONE tablet 5 mg Daily    QUEtiapine tablet 50 mg QHS    ramelteon tablet 8 mg Nightly PRN    tacrolimus capsule 1 mg BID       Objective:     Vital Signs (Most Recent):  Temp: 98.1 °F (36.7 °C) (04/24/18 1141)  Pulse: 108 (04/24/18 1142)  Resp: 18 (04/24/18 1141)  BP: (!) 93/57 (04/24/18 1141)  SpO2: 95 % (04/24/18 1141)  O2 Device (Oxygen Therapy): room air (04/24/18 1141) Vital Signs (24h Range):  Temp:  [98.1 °F (36.7 °C)-99 °F (37.2 °C)] 98.1 °F (36.7 °C)  Pulse:   [108-115] 108  Resp:  [18-20] 18  SpO2:  [93 %-96 %] 95 %  BP: ()/(51-60) 93/57     Weight: 74.8 kg (164 lb 14.5 oz) (04/20/18 1100)  Body mass index is 25.83 kg/m².  Body surface area is 1.88 meters squared.    I/O last 3 completed shifts:  In: 1579.6 [I.V.:39.6; Other:600; NG/GT:940]  Out: 1081 [Other:1081]    Physical Exam   Constitutional: He appears well-developed. No distress.   Trach   HENT:   Head: Normocephalic and atraumatic.   Eyes: Conjunctivae and EOM are normal.   Cardiovascular: Regular rhythm.  Tachycardia present.    Pulmonary/Chest: He has no wheezes. He has no rales.   Abdominal: Soft. He exhibits no distension.   Musculoskeletal: He exhibits no edema, tenderness or deformity.   Neurological: He is alert.   Skin: Skin is warm and dry. He is not diaphoretic.       Significant Labs:  All labs within the past 24 hours have been reviewed.     Significant Imaging:  Labs: Reviewed    Assessment/Plan:     * Acute renal failure with acute tubular necrosis superimposed on stage 3 chronic kidney disease    - baseline sCr 2.6-3.0 consistent with CKD stage IV  - anuric CACHORRO; likely ischemic ATN from prolonged pre-renal state (diarrhea) in setting of prograf renal vasoconstriction; cannot r/o septic ATN or Prograf toxicity  - SLED started 3/14. TDC placed 4/4/18.   - remains anuric  -4/23 3 hour HD.  -Plan for HD tomorrow.   -Plan for discharge to [facility] inpatient re[facility] (opens May 1) when off insulin gtt.             Thank you for your consult. I will follow-up with patient. Please contact us if you have any additional questions.    Amanda Cuellar NP  Nephrology  [facility]

## 2018-04-24 NOTE — SUBJECTIVE & OBJECTIVE
Interval History: No complaints overnight. Didn't sleep well but otherwise feels ok. R leg pain totally resolved. Abdominal pain nearly gone. Wife at bedside.     Continuous Infusions:   insulin (HUMAN R) infusion (adults) 1 Units/hr (04/24/18 0916)     Scheduled Meds:   sodium chloride 0.9%   Intravenous Once    acetaminophen  999.0148 mg Per G Tube TID    cetirizine  5 mg Per G Tube Daily    chlorhexidine  15 mL Mouth/Throat BID    epoetin jose miguel (PROCRIT) injection  4,000 Units Intravenous Every Mon, Wed, Fri    escitalopram oxalate  20 mg Per G Tube Daily    famotidine  20 mg Per G Tube QHS    heparin (porcine)  5,000 Units Subcutaneous Q12H    levothyroxine  75 mcg Intravenous Daily    midodrine  2.5 mg Oral TID    polyethylene glycol  17 g Oral Daily    predniSONE  5 mg Per G Tube Daily    QUEtiapine  50 mg Oral QHS    tacrolimus  1 mg Sublingual BID     PRN Meds:sodium chloride 0.9%, heparin (porcine), insulin aspart U-100, midodrine, ondansetron, oxyCODONE, povidone-iodine, ramelteon    Review of patient's allergies indicates:   Allergen Reactions    No known drug allergies      Objective:     Vital Signs (Most Recent):  Temp: 98.1 °F (36.7 °C) (04/24/18 1141)  Pulse: 108 (04/24/18 1142)  Resp: 18 (04/24/18 1141)  BP: (!) 93/57 (04/24/18 1141)  SpO2: 95 % (04/24/18 1141) Vital Signs (24h Range):  Temp:  [98.1 °F (36.7 °C)-99 °F (37.2 °C)] 98.1 °F (36.7 °C)  Pulse:  [104-115] 108  Resp:  [18-20] 18  SpO2:  [93 %-96 %] 95 %  BP: ()/(51-72) 93/57     No data found.    Body mass index is 25.83 kg/m².      Intake/Output Summary (Last 24 hours) at 04/24/18 1252  Last data filed at 04/24/18 0500   Gross per 24 hour   Intake          1337.85 ml   Output             1081 ml   Net           256.85 ml       Hemodynamic Parameters:       Physical Exam   Constitutional: He is oriented to person, place, and time.   Generalized weakness but normal ROM   Neck: No JVD present. Trache in place and well   secured  Cardiovascular: Normal rate, regular rhythm and normal heart sounds.    Pulmonary/Chest: Effort normal and breath sounds normal.   Abdominal: Soft. Bowel sounds are normal. Mild diffuse TTP with deep palpation  Musculoskeletal: Normal range of motion. He exhibits no edema or tenderness.   Neurological: He is alert and oriented to person, place, and time.   Skin: Skin is warm and dry.   Psychiatric: - able to interact and ask questions this morning  Nursing note and vitals reviewed.    Significant Labs:  CBC:    Recent Labs  Lab 04/22/18  0532 04/23/18  0615 04/24/18  0532   WBC 2.69* 2.71* 3.06*   RBC 2.60* 2.57* 2.39*   HGB 8.2* 8.0* 7.5*   HCT 26.6* 25.7* 24.2*    193 194   * 100* 101*   MCH 31.5* 31.1* 31.4*   MCHC 30.8* 31.1* 31.0*     BNP:  No results for input(s): BNP in the last 168 hours.    Invalid input(s): BNPTRIAGELBLO  CMP:    Recent Labs  Lab 04/22/18  0532 04/23/18  0615 04/24/18  0532   * 221* 261*   CALCIUM 10.6* 10.6* 9.2   ALBUMIN 2.1* 2.1* 1.9*   PROT 6.2 6.2 5.7*    134* 134*   K 4.5 4.6 3.4*   CO2 22* 18* 23    99 96   BUN 38* 50* 33*   CREATININE 2.9* 3.9* 2.4*   ALKPHOS 210* 199* 203*   ALT 11 10 9*   AST 11 13 13   BILITOT 0.4 0.3 0.3      Coagulation:   No results for input(s): PT, INR, APTT in the last 168 hours.  LDH:  No results for input(s): LDH in the last 72 hours.  Microbiology:  Microbiology Results (last 7 days)     Procedure Component Value Units Date/Time    Fungus culture [[id number]] Collected:  03/14/18 1137    Order Status:  Completed Specimen:  Bronchial Wash from Amniotic Fluid Updated:  04/18/18 1303     Fungus (Mycology) Culture No fungus isolated after 4 weeks          I have reviewed all pertinent labs within the past 24 hours.      Estimated Creatinine Clearance: 36.7 mL/min (A) (based on SCr of 2.4 mg/dL (H)).    Diagnostic Results:

## 2018-04-25 NOTE — PT/OT/SLP PROGRESS
Physical Therapy      Patient Name:  Lv Crocker   MRN:  [id number]    Patient not seen today secondary to Dialysis. Will follow-up at next scheduled session as able.    Petra Lake, PT, DPT   4/25/2018  [phone]

## 2018-04-25 NOTE — PT/OT/SLP PROGRESS
"Speech Language Pathology Treatment    Patient Name:  Lv Crocker   MRN:  [id number]  Admitting Diagnosis: Acute renal failure with acute tubular necrosis superimposed on stage 3 chronic kidney disease    Recommendations:                 General Recommendations:  Dysphagia therapy, Cognitive-linguistic therapy, Modified barium swallow study and One Way Speaking Valve  Diet recommendations:  NPO, Liquid Diet Level: NPO   Aspiration Precautions: Continue alternate means of nutrition, Frequent oral care and Strict aspiration precautions   General Precautions: Standard, aspiration, fall  Communication strategies:  One way speaking valve              PMSV precautions: Wear only when awake/alert with Supervision, Do not sleep with valve on, Wash with soap and water  Subjective     SLP reviewed Pt with nurse, Pt MARK for dialysis upon first attempt, nurse reports Pt with cough today, wearing valve intermittently, CXR ordered.   Pt presents calm  He says, "I was coughing pretty bad earlier so not really right now" when discussing wearing the valve    Pain/Comfort:  · Pain Rating 1: 2/10  · Location - Orientation 1: generalized  · Location 1: throat  · Pain Addressed 1: Nurse notified  · Pain Rating Post-Intervention 1: 2/10    Objective:     Has the patient been evaluated by SLP for swallowing?   Yes  Keep patient NPO? Yes   Current Respiratory Status: trach cuffless      Pt seen for ongoing cognitive-linguistic tx and speaking valve training. Patient awake in bed watching TV upon SLP entrance to room. Pt talking around trach and mouthing to communicate with SLP. He declines PMSV trials today 2/2 sore throat and cough. Pt oriented x4, provided minimal visual cues. Pt answers m/u YNQ appropriately. He is educated on recommendations for further instrumental assessment of swallow fx via MBSS and MBSS procedure. Patient verbalizes understanding and asks if he can have pudding instead of apple sauce. SLP verbalizes " understanding. No further questions. Whiteboard current.     Assessment:     Lv Crocker is a 42 y.o. male with an SLP diagnosis of Dysphagia, Cognitive-Linguistic Impairment, S/P Trach and One Way Speaking Valve Training.  He presents with increased orientation today.  He declines wearing valve 2/2 sore throat and cough. Modified Barium Swallow Study tentatively scheduled for 4/26/18.     Goals:    SLP Goals        Problem: SLP Goal    Goal Priority Disciplines Outcome   SLP Goal     SLP Ongoing (interventions implemented as appropriate)   Description:  Speech Language Pathology Goals  Goals expected to be met by 4/27/18  1.  Pt will tolerate PMSV for 30 minutes w/ no change in respiratory status and fair voicing produced.  2.  Pt will participate in ongoing swallow assessment   3. Pt will name up to 12 items/minute in a concrete category, 90% of attempts, Supervision, to improve cognitive organization  4. Pt will recall 3 related items post 3 minute filled delay, 90% of attempts, Supervision, to improve STM   5. Pt will answer m/u YNQ with 90% accuracy, Supervision, to improve higher-level comprehension   6. Pt will complete further assessment of reading/writing skills  7. Educate Pt on aspiration precautions and S/S aspiration  8. Educate Pt and family on PMSV precautions and safety awareness     Goals expected to be met by 4/20/18  1.  Pt will tolerate PMSV for 30 minutes w/ no change in respiratory status and fair voicing produced.  2.  Pt will complete further evaluation of cognitive-linguistic communication skills to determine the need for additional goals. Met 4/19  3. Pt will name up to 12 items/minute in a concrete category, 90% of attempts, Supervision, to improve cognitive organization  4. Pt will recall 3 related items post 3 minute filled delay, 90% of attempts, Supervision, to improve STM   5. Pt will answer m/u YNQ with 90% accuracy, Supervision, to improve higher-level comprehension   6.  Pt will complete further assessment of reading/writing skills  7. Pending MD clearance, Pt will complete further assessment of swallow fx   8. Educate Pt and family on PMSV precautions and safety awareness      Goals expected to be met by 4/13/18     1.  Pt will tolerate PMSV for 3 min w/ no change in respiratory status and fair voicing produced. Met   2.  Pt will complete further evaluation of cognitive-linguistic communication skills to determine the need for additional goals.                            Plan:     · Patient to be seen:  5 x/week   · Plan of Care expires:  05/05/18  · Plan of Care reviewed with:  patient, spouse   · SLP Follow-Up:  Yes       Discharge recommendations:  rehabilitation facility   Barriers to Discharge:  Level of Skilled Assistance Needed     Time Tracking:     SLP Treatment Date:   04/25/18  Speech Start Time:  1542  Speech Stop Time:  1551     Speech Total Time (min):  9 min    Billable Minutes: Speech Therapy Individual 9     JOSELYN Delarosa., [facility]-SLP  Speech-Language Pathology  Pager: [phone]    04/25/2018

## 2018-04-25 NOTE — PLAN OF CARE
Problem: Patient Care Overview  Goal: Plan of Care Review  Outcome: Ongoing (interventions implemented as appropriate)  Pt is AAOx4 in bed wearing non-skid footwear, bed in low/locked position and with call bell within reach. Pt reminded to use call bell to call for assistance, pt verbalizes understanding. Wife at bedside. Pt is afebrile at this time. Proper hand hygiene performed before and after pt care activities. Mid-line abdominal incision LETICIA with staples, free from SSI. Trach in place with #6 shiley, 97% on room air. Patient coughing independently. Tube feedings infusing at 40cc/hr, max residual this shift of 20cc. BG monitored Q4hrs has been 164 and 184 this shift. Sliding scale coverage administered as needed, continuous Insulin gtt infusing at 1cc/hr. Denies any pain or discomfort at this time.

## 2018-04-25 NOTE — PROGRESS NOTES
[facility]  Heart Transplant  Progress Note    Patient Name: Lv Crocker  MRN: [id number]  Admission Date: 3/11/2018  Hospital Length of Stay: 44 days  Attending Physician: Behzad Lara Jr.,*  Primary Care Provider: Philippe Mohr MD  Principal Problem:Acute renal failure with acute tubular necrosis superimposed on stage 3 chronic kidney disease    Subjective:     Interval History: No complaints overnight other than lingering cough (non productive). Seen and examined in dialysis. Will reassess once back on the floor. Was very happy to be taken outside.    Continuous Infusions:   insulin (HUMAN R) infusion (adults) 1.2 Units/hr (04/25/18 1013)     Scheduled Meds:   sodium chloride 0.9%   Intravenous Once    sodium chloride 0.9%   Intravenous Once    acetaminophen  999.0148 mg Per G Tube TID    cetirizine  5 mg Per G Tube Daily    chlorhexidine  15 mL Mouth/Throat BID    epoetin jose miguel (PROCRIT) injection  4,000 Units Intravenous Every Mon, Wed, Fri    escitalopram oxalate  20 mg Per G Tube Daily    famotidine  20 mg Per G Tube QHS    heparin (porcine)  5,000 Units Subcutaneous Q12H    levothyroxine  75 mcg Intravenous Daily    midodrine  10 mg Oral TID    polyethylene glycol  17 g Oral BID    predniSONE  5 mg Per G Tube Daily    QUEtiapine  50 mg Oral QHS    tacrolimus  1 mg Sublingual BID     PRN Meds:sodium chloride 0.9%, sodium chloride 0.9%, heparin (porcine), insulin aspart U-100, midodrine, ondansetron, oxyCODONE, povidone-iodine, ramelteon    Review of patient's allergies indicates:   Allergen Reactions    No known drug allergies      Objective:     Vital Signs (Most Recent):  Temp: 97.9 °F (36.6 °C) (04/25/18 0840)  Pulse: 101 (04/25/18 1134)  Resp: 18 (04/25/18 1115)  BP: (!) 98/57 (04/25/18 1145)  SpO2: 100 % (04/25/18 1115) Vital Signs (24h Range):  Temp:  [97.6 °F (36.4 °C)-98.5 °F (36.9 °C)] 97.9 °F (36.6 °C)  Pulse:  [100-106] 101  Resp:  [18-20]  18  SpO2:  [97 %-100 %] 100 %  BP: ()/(46-73) 98/57     Patient Vitals for the past 72 hrs (Last 3 readings):   Weight   04/25/18 0500 75.9 kg (167 lb 5.3 oz)     Body mass index is 26.21 kg/m².    No intake or output data in the 24 hours ending 04/25/18 1201    Hemodynamic Parameters:       Physical Exam   Constitutional: He is oriented to person, place, and time.   Generalized weakness but normal ROM   Neck: No JVD present. Trache in place and well  secured  Cardiovascular: Normal rate, regular rhythm and normal heart sounds.    Pulmonary/Chest: Effort normal and breath sounds normal.   Abdominal: Soft. Bowel sounds are normal. Mild diffuse TTP with deep palpation  Musculoskeletal: Normal range of motion. He exhibits no edema or tenderness.   Neurological: He is alert and oriented to person, place, and time.   Skin: Skin is warm and dry.   Psychiatric: - able to interact and ask questions this morning  Nursing note and vitals reviewed.    Significant Labs:  CBC:    Recent Labs  Lab 04/23/18  0615 04/24/18  0532 04/25/18  0505   WBC 2.71* 3.06* 3.29*   RBC 2.57* 2.39* 2.50*   HGB 8.0* 7.5* 7.7*   HCT 25.7* 24.2* 25.5*    194 208   * 101* 102*   MCH 31.1* 31.4* 30.8   MCHC 31.1* 31.0* 30.2*     BNP:  No results for input(s): BNP in the last 168 hours.    Invalid input(s): BNPTRIAGELBLO  CMP:    Recent Labs  Lab 04/23/18 0615 04/24/18  0532 04/25/18  0505   * 261* 222*   CALCIUM 10.6* 9.2 9.6   ALBUMIN 2.1* 1.9* 2.0*   PROT 6.2 5.7* 5.9*   * 134* 132*   K 4.6 3.4* 3.7   CO2 18* 23 24   CL 99 96 94*   BUN 50* 33* 45*   CREATININE 3.9* 2.4* 3.3*   ALKPHOS 199* 203* 186*   ALT 10 9* 10   AST 13 13 16   BILITOT 0.3 0.3 0.3      Coagulation:   No results for input(s): PT, INR, APTT in the last 168 hours.  LDH:  No results for input(s): LDH in the last 72 hours.  Microbiology:  Microbiology Results (last 7 days)     Procedure Component Value Units Date/Time    Fungus culture [[id number]]  Collected:  03/14/18 1138    Order Status:  Completed Specimen:  Bronchial Wash from Amniotic Fluid Updated:  04/18/18 1303     Fungus (Mycology) Culture No fungus isolated after 4 weeks        I have reviewed all pertinent labs within the past 24 hours.    Estimated Creatinine Clearance: 26.7 mL/min (A) (based on SCr of 3.3 mg/dL (H)).    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 Most recently has had ongoing issues with his lungs, had gotten 2 thoracenteses, after which he underwent VATS 01/19/18 followed by pleurex catheter placement and effusion drainage 01/11/18, subsequently had it removed 02/07/18 after drainage had decreased despite multiple attempts to drain it. Has been having significant coughing fits that result in him being near-syncopal at times, although difficult to say if he has passed out or not- patient most recently was admitted to the hospital for increased AGUILAR, coughing and pre[place] [facility]   Patient was started on antibiotics for suspected bacterial infection, with some diarrhea, bronchitis, and possible pneumonia. Ct chest showed some pleural fluid collection and after talking with Dr. James, we arranged for him to receive a diagnostic tap with IR, from which the fluid collection demonstrated no growth or significant findings at all really. Cell counts do not appear to have been sent but will recheck. Patient states since his hospital stay, yesterday had a significant coughing fit again, after which he nearly passed out, is being seen in clinic today for this. Denies any cardiopulmonary complaints, no leg swelling, has some abdominal swelling, which is moderate for him. Denies significant shortness of breath with mild exertion, had 6MWT yesterday to  see if he qualified for home O2, and his O2 sats actually improved after recovery from where he started initially. He and his wife admit he is in bed most days, not very active.     Mr. Crocker presented to the ED 3/11/18 with approximately 3 weeks of worsening diarrhea and 2-3 days of sinus pressure, drainage, and worsened cough.  He notes that he had a coughing fit last night and passed out, coughed throughout most of the night.  This also happened on 2/25/18.  He last saw Dr Ferreira in clinic on 2/20/18 where he was taken off myfortic and switched to cellcept (he hadn't been on cellcept because of leukopenia when they tried post-transplant).  Though his diarrhea had improved after his last discharge, he states it has increased now to 15x/day, clear/yellow/green, no fevers, no exacerbating foods per say.  He was taken back off the cellcept and placed back on myfortic this past week and has not noticed immediate change in diarrhea. He also reports his baseline coughing fits havent improved and are minimally responsive to tessalon pearls.  Came on last night, he lost consciousness during a fit once which is relatively normal for him, and had two more during HPI.  He notes no sick contacts but does state he's had some URI symptoms as above.  His baseline vitals are usually -120 and BP 90s/60s-110s/70s.  He reports a history of intermittent diarrhea - did have Cdiff many years ago but this smells different.  No abdominal pain, no nausea, no vomiting.  No blood in diarrhea.  Appears last c-scope in 2015 with no evidence of infection or inflammatory processes.  He does report IBS which is different that this.       * Acute renal failure with acute tubular necrosis superimposed on stage 3 chronic kidney disease    - Appreciate Nephrology recs.   - Continue middorine for pressure support  - HD per nephrology. Goal is SLED vs evening HD to allow for more time with therapy and better sleep hygiene.          Gastroparesis    - abdominal pain improved from weekend  - BM on 4/214, passing gas  - KUB without evidence of bowel obstructionunclear if  worsening gastroparesis or new partial obstruction  - TF restarted and @ 40 hr. Per GI recs patient should tolerate up to 400 mL residuals. Will wean narcotics. Can complete CT AP if needed however pain is mild and improving at this point  - CMV PCR pending        Alteration in skin integrity    - wound care following        Acute respiratory failure with hypoxia    - Resolved.   - S/P Bronch and intubation 3/14 now post tracheostomy due to inability to extubate  - Pulmonology signed off. Completely weaned off vent.   - RSV positive early and completed course of Ribavarin/IVIG.  - ID had been following. Completed 7 day course of Meropenem/Linezolid earlier in hospitalization  - Appreciate PT/OT/Wound care.        Restrictive cardiomyopathy    - No changes from before.   - Has known history of recurrent ascites and pleural effusions   - S/P thoracentesis X 2, followed by VATS/pleurex cath placement (January 2018) with removal of pleurex (February 2018) and 3rd thoracentesis 2/14/18 with no significant findings on fluid removal.  - repeat echo reviewed and in epic        Type 1 diabetes mellitus with stage 3 chronic kidney disease    - Appreciate Endocrine's recs  - Insulin gtt per endocrine recs. Adjust as needed        Hypothyroidism due to Hashimoto's thyroiditis    - Appreciate Endocrine's recs  - Continue Levothyroxine 75mcg IV daily        Anemia    - Transfused 2 units of PRBCs on 4/11 during HD  - Nephrology has resumed ProCrit         Heart transplanted    - TTE 3/26/18 showed normal graft function but has known history of restrictive CM post transplant.   - Repeat echo reviewed and in epic  - Continue pred 5  - Tacro- current dose is 1/1 SL daily, adjust as needed for goal 6-10  - Cellcept remains on hold due to leukopenia          Debility    - PT/OT/SLP daily.  Recommend rehab.  - Nutrition following.   - Continue tube feeds. Switched from glucerna to novasource renal (start at 10 and increase to goal of 40 as tolerated)        Anxiety    - Continue PTA  Escitalopram, will stop xanex and nortriptyline due to hallucinations and give sleep med and less pain meds.  - Increased Lexapro to 20mg daily per psych recs  - started Seroquel as per psych rec.            Kelsea Ryan PA-C  Heart Transplant  [facility]

## 2018-04-25 NOTE — SUBJECTIVE & OBJECTIVE
Interval History: No acute events overnight. Tolerating 1 L net UF with albumin and midodrine PRN.    Review of patient's allergies indicates:   Allergen Reactions    No known drug allergies      Current Facility-Administered Medications   Medication Frequency    0.9%  NaCl infusion Once    0.9%  NaCl infusion PRN    0.9%  NaCl infusion PRN    0.9%  NaCl infusion Once    acetaminophen oral solution 999.0148 mg TID    cetirizine tablet 5 mg Daily    chlorhexidine 0.12 % solution 15 mL BID    epoetin jose miguel injection 4,000 Units Every Mon, Wed, Fri    escitalopram oxalate tablet 20 mg Daily    famotidine tablet 20 mg QHS    heparin (porcine) injection 1,000 Units PRN    heparin (porcine) injection 5,000 Units Q12H    insulin aspart U-100 pen 0-4 Units Q4H PRN    insulin regular (Humulin R) 100 Units in sodium chloride 0.9% 100 mL infusion Continuous    levothyroxine injection 75 mcg Daily    midodrine tablet 10 mg PRN    midodrine tablet 10 mg TID    ondansetron disintegrating tablet 4 mg Q6H PRN    oxyCODONE 5 mg/5 mL solution 5 mg Q6H PRN    polyethylene glycol packet 17 g BID    povidone-iodine 10 % ointment PRN    predniSONE tablet 5 mg Daily    QUEtiapine tablet 50 mg QHS    ramelteon tablet 8 mg Nightly PRN    tacrolimus capsule 1 mg BID       Objective:     Vital Signs (Most Recent):  Temp: 97.9 °F (36.6 °C) (04/25/18 0840)  Pulse: 101 (04/25/18 1134)  Resp: 18 (04/25/18 1115)  BP: 99/73 (04/25/18 1115)  SpO2: 100 % (04/25/18 1115)  O2 Device (Oxygen Therapy): Trach Collar (04/25/18 1115) Vital Signs (24h Range):  Temp:  [97.6 °F (36.4 °C)-98.5 °F (36.9 °C)] 97.9 °F (36.6 °C)  Pulse:  [100-106] 101  Resp:  [18-20] 18  SpO2:  [97 %-100 %] 100 %  BP: ()/(46-73) 99/73     Weight: 75.9 kg (167 lb 5.3 oz) (04/25/18 0500)  Body mass index is 26.21 kg/m².  Body surface area is 1.89 meters squared.    I/O last 3 completed shifts:  In: 408.8 [I.V.:8.8; NG/GT:400]  Out: -     Physical Exam    Constitutional: He appears well-developed. No distress.   Trach   HENT:   Head: Normocephalic and atraumatic.   Eyes: Conjunctivae and EOM are normal.   Cardiovascular: Regular rhythm.  Tachycardia present.    Pulmonary/Chest: He has no wheezes. He has no rales.   L IJ TDC   Abdominal: Soft. He exhibits no distension.   LUQ PEG   Neurological: He is alert.       Significant Labs:  All labs within the past 24 hours have been reviewed.     Significant Imaging:  Labs: Reviewed

## 2018-04-25 NOTE — PROGRESS NOTES
[facility]  Physical Medicine & Rehab  Progress Note    Patient Name: Lv Crocker  MRN: [id number]  Admission Date: 3/11/2018  Length of Stay: 44 days  Attending Physician: Behzad Lara Jr.,*    Subjective:     Principal Problem:Acute renal failure with acute tubular necrosis superimposed on stage 3 chronic kidney disease    Hospital Course:   3/24/18:  Evaluated by PT and OT.  Bed mobility and transfers deferred 2/2 intubation and CRRT.   3/26/18:  Participated with PT.  Bed mobility totalA-dependent.  EOB totalA.  3/28/18:  Participated with PT and OT.  Bed mobility totalA-dependent.  ADLs totalA.   4/16/18:  Participated with PT and OT.  Bed mobility max-totalA/dependent.  Sit to stand totalA and transfers totalA.  EOB mod-totalA.  UBD totalA and LBD totalA.  4/17/18:  Participated with PT and SLP.  Bed mobility total-dependent.  EOB x 15 minutes mod-maxA.   4/18/18:  Participated with OT.  Bed mobility totalA.  EOB x 15 minutes totalA.    4/19/18:  Participated with PT, OT, and SLP.  Found to have cognitive-linguistic impairment and dysphonia.  Remains NPO.  Bed mobility total/dependent.  EOB x 24 minutes (static sit x ~10 seconds + ~5 seconds SV) mod-maxA.  No sit to stand, transfers, or gait.  ADLs totalA.   4/22/18:  Participated with PT and OT.  Bed mobility totalA.  EOB maxA.  Sit to stand max-totalA and transfers max-totalA.  LBD maxA.  4/23/18:  No PT or OT 2/2 HD.   4/24/18:  Participated with PT.  Bed mobility maxA.  Sit to stand and transfers maxA.  EOB maxA.   4/25/18:  Participated with OT.  Bed mobility totalA.  Bed to medi chair transfer dependent (totalA x 2).  EOB x 20 minutes maxA.  ADLs totalA.      Interval History 4/25/2018:  Patient is seen for follow-up rehab evaluation and recommendations: No acute events over night.  HD this morning.  Participated with therapy yesterday.  Barriers for discharge/rehab admission: outpatient HD chair    HPI, Past Medical,  Family, and Social History remains the same as documented in the initial encounter.    Scheduled Medications:    sodium chloride 0.9%   Intravenous Once    acetaminophen  999.0148 mg Per G Tube TID    cetirizine  5 mg Per G Tube Daily    chlorhexidine  15 mL Mouth/Throat BID    epoetin jose miguel (PROCRIT) injection  4,000 Units Intravenous Every Mon, Wed, Fri    escitalopram oxalate  20 mg Per G Tube Daily    famotidine  20 mg Per G Tube QHS    heparin (porcine)  5,000 Units Subcutaneous Q12H    levothyroxine  75 mcg Intravenous Daily    midodrine  10 mg Oral TID    polyethylene glycol  17 g Oral BID    predniSONE  5 mg Per G Tube Daily    QUEtiapine  50 mg Oral QHS    tacrolimus  1 mg Sublingual BID     PRN Medications: sodium chloride 0.9%, sodium chloride 0.9%, dextromethorphan-guaifenesin  mg/5 ml, heparin (porcine), insulin aspart U-100, midodrine, ondansetron, oxyCODONE, povidone-iodine, ramelteon    Review of Systems   Constitutional: Positive for activity change. Negative for chills, fatigue and fever.   HENT: Negative for drooling, hearing loss, trouble swallowing and voice change.    Eyes: Negative for pain and visual disturbance.   Respiratory: Negative for cough and wheezing.         + trach   Cardiovascular: Negative for chest pain and palpitations.   Gastrointestinal: Negative for abdominal pain, nausea and vomiting.        + PEG   Genitourinary: Negative for difficulty urinating and flank pain.   Musculoskeletal: Positive for gait problem and myalgias. Negative for arthralgias, back pain and neck pain.   Skin: Positive for color change and wound. Negative for rash.   Allergic/Immunologic: Positive for immunocompromised state.   Neurological: Positive for weakness. Negative for dizziness, numbness and headaches.   Psychiatric/Behavioral: Positive for hallucinations (improving) and sleep disturbance (improving). Negative for agitation. The patient is not nervous/anxious.      Objective:      Vital Signs (Most Recent):  Temp: 97.9 °F (36.6 °C) (18 0840)  Pulse: 109 (18 1447)  Resp: 18 (18 1200)  BP: 107/63 (18 1215)  SpO2: 100 % (18 1200)    Vital Signs (24h Range):  Temp:  [97.6 °F (36.4 °C)-98.5 °F (36.9 °C)] 97.9 °F (36.6 °C)  Pulse:  [100-109] 109  Resp:  [18-20] 18  SpO2:  [97 %-100 %] 100 %  BP: ()/(46-73) 107/63     Physical Exam   Constitutional: He is oriented to person, place, and time. He appears well-developed. No distress.   HENT:   Head: Normocephalic and atraumatic.   Right Ear: External ear normal.   Left Ear: External ear normal.   Nose: Nose normal.   Eyes: Right eye exhibits no discharge. Left eye exhibits no discharge. No scleral icterus.   Neck: Neck supple.   Trach intact.    Cardiovascular: Normal rate, regular rhythm and intact distal pulses.    Pulmonary/Chest: Effort normal. No respiratory distress. He has no wheezes.   Abdominal: Soft. He exhibits no distension. There is no tenderness.   PEG intact, incision LETICIA, CDI   Musculoskeletal: He exhibits no edema or tenderness.   R foot: R great toe and R 3rd toe with necrotic tissue  L foot: small amount of necrotic tissue to L great toe  Overall deconditioning   Neurological: He is alert and oriented to person, place, and time.   -  Mental Status:  AAOx3.  Follows commands.  Answers correct age and .  Recent and remote memory intact.  -  Speech and language:  no aphasia or dysarthria.    -  Motor:  RUE: 2/5, 3/5 .  LUE: 2+/5, 3/5 .  RLE: 2/5, DF 3+/5, PF 3+/5.  LLE: 2/5, DF 2/5, PF 3+/5.  -  Sensory:  Intact to light touch and pin prick.   Skin: Skin is warm and dry. No rash noted.   Psychiatric: His behavior is normal. Thought content normal. His affect is blunt.   Vitals reviewed.    Diagnostic Results:   Labs: Reviewed  X-Ray: Reviewed  US: Reviewed  CT: Reviewed    Assessment/Plan:      * Acute renal failure with acute tubular necrosis superimposed on stage 3 chronic kidney  "disease    -  Nephrology following "anuric CACHORRO; likely ischemic ATN 2/2 prolonged pre-renal state (diarrhea) in setting of prograf renal vasoconstriction; cannot r/o septic ATN or Prograf toxicity"  -  On HD  -  S/p perm-a-cath placement on 4/4/18        Gangrene    -  Stable gangrene to right great toe and 3rd toe. Ischemic changes to right second toe and left hallux  -  Wound care following  -  Podiatry following         Severe malnutrition    -  S/p PEG placement on 4/4/18  -  On continuous TF        Acute respiratory failure with hypoxia    -  Intubated on 3/14/18--> s/p trach 3/28/18--> now on RA  -  Completed 7 day course of Meropenem/Linezolid   -  RSV positive early- completed course of Ribavarin/IVIG        Type 1 diabetes mellitus with stage 3 chronic kidney disease    -  Endocrine following  -  On insulin gtt        Heart transplanted    -  S/p OHTX 11/18/2014 with early post-op course complicated by hemorrhagic tamponade s/p  washout, delayed closure, pericardial window with subsequent a-fib with RVR and CACHORRO and late course complicated by ABMR (in 4/2015 s/p rituxan, PLEX, and IVIG), C.diff/CMV reactivation, CKD, and restrictive cardiomyopathy with subsequent chronic/recurrent pleural effusions and ascites previously requiring monthly paracentesis and thoracentesis until he underwent VATS with pleurX catheter placement on 1/11/2018 with removal on 2/7/2018        Debility    -  2/2 prolonged and acute hospital course  -  (I) ADLs and mobility prior to admission, limited by fatigue/endurance  Recommendations  -  Encourage mobility, OOB in chair, and early ambulation as appropriate  -  PT/OT evaluate and treat  -  Pain management  -  Monitor for and prevent skin breakdown and pressure ulcers  · Early mobility, repositioning/weight shifting every 20-30 minutes when sitting, turn patient every 2 hours, proper mattress/overlay and chair cushioning, pressure relief/heel protector boots  -  DVT prophylaxis:  " MARK, SCD        Anxiety    -  Anxiety and sleeping difficulty   -  Psych consulted--> increased Lexapro to 20 mg, started Seroquel--> stopped xanax and nortriptyline 2/2 hallucinations        Patient approved for [facility].   Transfer to rehab when medically ready for discharge and outpatient HD chair arranged.      Pinky Garcia, RENETTA  Department of Physical Medicine & Rehab   [facility]

## 2018-04-25 NOTE — ASSESSMENT & PLAN NOTE
- No changes from before.   - Has known history of recurrent ascites and pleural effusions   - S/P thoracentesis X 2, followed by VATS/pleurex cath placement (January 2018) with removal of pleurex (February 2018) and 3rd thoracentesis 2/14/18 with no significant findings on fluid removal.  - repeat echo reviewed and in epic

## 2018-04-25 NOTE — SUBJECTIVE & OBJECTIVE
Interval History 4/25/2018:  Patient is seen for follow-up rehab evaluation and recommendations: No acute events over night.  HD this morning.  Participated with therapy yesterday.  Barriers for discharge/rehab admission: outpatient HD chair    HPI, Past Medical, Family, and Social History remains the same as documented in the initial encounter.    Scheduled Medications:    sodium chloride 0.9%   Intravenous Once    acetaminophen  999.0148 mg Per G Tube TID    cetirizine  5 mg Per G Tube Daily    chlorhexidine  15 mL Mouth/Throat BID    epoetin jose miguel (PROCRIT) injection  4,000 Units Intravenous Every Mon, Wed, Fri    escitalopram oxalate  20 mg Per G Tube Daily    famotidine  20 mg Per G Tube QHS    heparin (porcine)  5,000 Units Subcutaneous Q12H    levothyroxine  75 mcg Intravenous Daily    midodrine  10 mg Oral TID    polyethylene glycol  17 g Oral BID    predniSONE  5 mg Per G Tube Daily    QUEtiapine  50 mg Oral QHS    tacrolimus  1 mg Sublingual BID     PRN Medications: sodium chloride 0.9%, sodium chloride 0.9%, dextromethorphan-guaifenesin  mg/5 ml, heparin (porcine), insulin aspart U-100, midodrine, ondansetron, oxyCODONE, povidone-iodine, ramelteon    Review of Systems   Constitutional: Positive for activity change. Negative for chills, fatigue and fever.   HENT: Negative for drooling, hearing loss, trouble swallowing and voice change.    Eyes: Negative for pain and visual disturbance.   Respiratory: Negative for cough and wheezing.         + trach   Cardiovascular: Negative for chest pain and palpitations.   Gastrointestinal: Negative for abdominal pain, nausea and vomiting.        + PEG   Genitourinary: Negative for difficulty urinating and flank pain.   Musculoskeletal: Positive for gait problem and myalgias. Negative for arthralgias, back pain and neck pain.   Skin: Positive for color change and wound. Negative for rash.   Allergic/Immunologic: Positive for immunocompromised state.    Neurological: Positive for weakness. Negative for dizziness, numbness and headaches.   Psychiatric/Behavioral: Positive for hallucinations (improving) and sleep disturbance (improving). Negative for agitation. The patient is not nervous/anxious.      Objective:     Vital Signs (Most Recent):  Temp: 97.9 °F (36.6 °C) (18 0840)  Pulse: 109 (18 1447)  Resp: 18 (18 1200)  BP: 107/63 (18 1215)  SpO2: 100 % (18 1200)    Vital Signs (24h Range):  Temp:  [97.6 °F (36.4 °C)-98.5 °F (36.9 °C)] 97.9 °F (36.6 °C)  Pulse:  [100-109] 109  Resp:  [18-20] 18  SpO2:  [97 %-100 %] 100 %  BP: ()/(46-73) 107/63     Physical Exam   Constitutional: He is oriented to person, place, and time. He appears well-developed. No distress.   HENT:   Head: Normocephalic and atraumatic.   Right Ear: External ear normal.   Left Ear: External ear normal.   Nose: Nose normal.   Eyes: Right eye exhibits no discharge. Left eye exhibits no discharge. No scleral icterus.   Neck: Neck supple.   Trach intact.    Cardiovascular: Normal rate, regular rhythm and intact distal pulses.    Pulmonary/Chest: Effort normal. No respiratory distress. He has no wheezes.   Abdominal: Soft. He exhibits no distension. There is no tenderness.   PEG intact, incision LETICIA, CDI   Musculoskeletal: He exhibits no edema or tenderness.   R foot: R great toe and R 3rd toe with necrotic tissue  L foot: small amount of necrotic tissue to L great toe  Overall deconditioning   Neurological: He is alert and oriented to person, place, and time.   -  Mental Status:  AAOx3.  Follows commands.  Answers correct age and .  Recent and remote memory intact.  -  Speech and language:  no aphasia or dysarthria.    -  Motor:  RUE: 2/5, 3/5 .  LUE: 2+/5, 3/5 .  RLE: 2/5, DF 3+/5, PF 3+/5.  LLE: 2/5, DF 2/5, PF 3+/5.  -  Sensory:  Intact to light touch and pin prick.   Skin: Skin is warm and dry. No rash noted.   Psychiatric: His behavior is normal.  Thought content normal. His affect is blunt.   Vitals reviewed.    Diagnostic Results:   Labs: Reviewed  X-Ray: Reviewed  US: Reviewed  CT: Reviewed  NEUROLOGICAL EXAMINATION:     MENTAL STATUS   Oriented to person, place, and time.

## 2018-04-25 NOTE — ASSESSMENT & PLAN NOTE
BG goal 140-180,       Increase transition from 1 to 1.2u/hr, low correction q4hr checks     Pt is T1DM, do not suspend insulin >1 hr   If TF held, decrease insulin rate to 0.4u/hr   (known to have controlled BG on basal needs of lev 5 daily/ 0.2u/hr during this hospitalization)        Discharge Recommendations:  TBD.

## 2018-04-25 NOTE — PLAN OF CARE
Problem: Nutrition, Imbalanced: Inadequate Oral Intake (Adult)  Intervention: Promote/Optimize Nutrition  Recommendations     Recommendation/Intervention: Continue Novasource Renal @ 40mL/hr to provide 100% EEN/EPN.   -Patient has no free water flushes ordered. Fluid recommendations for HD are 1000mL + urine output. TF is providing 688mL fluid. Recommend adding 100mL H20 TID.   -Arginaid supplement is ordered with TF for wound healing but is not being given due to no FWF. Please give Arginaid BID with water flushes.   -Hold TF for nausea/vomiting and residuals > 500mL.   -Noted possible gastroparesis. This TF formula has no fiber in it therefore is okay for gastroparesis. If slow GI motility resolves, recommend adding psyllium BID. RD following.      Goals: Meet % EEN, EPN  Nutrition Goal Status: goal met  Communication of RD Recs: reviewed with RN

## 2018-04-25 NOTE — PROGRESS NOTES
[facility]  Nephrology  Progress Note    Patient Name: Lv Crocker  MRN: [id number]  Admission Date: 3/11/2018  Hospital Length of Stay: 44 days  Attending Provider: Behzad Lara Jr.,*   Primary Care Physician: Philippe Mohr MD  Principal Problem:Acute renal failure with acute tubular necrosis superimposed on stage 3 chronic kidney disease    Subjective:     HPI: Mr. Crocker is a 41 yo WM with T1DM, Hashimoto thyroiditis, h/o OHTx 11/2014, and CKD stage 4 who was admitted on 3/11/18 for persistent diarrhea. He reported a 3 week history of diarrhea up to 15x/day. Associated symptoms including URI symptoms, coughing fits, and coughing syncope. Prograf level was 14.3. His post-heart transplant course has been complicated by AMR 4/2015, CMV, post-transplant restrictive cardiomyopathy, recurrent pleural effusions/ascites requiring monthly thoracenteses/paracenteses, VATS 1/11/18 with Pleur-X catheter (now removed), and CKD stage 4 with baseline sCr 2.6-3.0. Baseline -120s and baseline BP 90s-110s/60-70s. Upon admission he was started on IVF and diarrhea workup was initiated. On 3/12 he was noted to have decreased UOP despite fluids; NS was increased to 100cc/hr. He was scheduled for a colonoscopy on 3/13 however procedure was cancelled due to hypoxia and fever. He was transferred to the ICU for closer monitoring. He continued to have oliguria overnight. Bladder scan revealed 200cc of urine but patient refused osullivan catheter placement. Wife reports that patient always has a hard time urinating again after osullivan catheters are placed/removed so he refuses them. He remained hypoxic despite FiO2 70% and ABG revealed combined respiratory and metabolic acidosis with pH 7.17. He was started on BiPAP as well as a bicarb infusion at 50cc/hr. ABG with mild improvement. AM labs revealed K 6.2 and he was shifted and given kayexalate.Trialysis catheter was placed this morning and he  subsequently developed hypotension and was started on levophed. He was intubated later this morning. Nephrology consulted for CACHORRO. All history obtained by primary team and chart review as patient was intubated/sedated on exam. Consent for dialysis obtained by patient's wife and placed in chart. Of note, both of patient's parents were on dialysis.     Interval History: No acute events overnight. Tolerating 1 L net UF with albumin and midodrine PRN.    Review of patient's allergies indicates:   Allergen Reactions    No known drug allergies      Current Facility-Administered Medications   Medication Frequency    0.9%  NaCl infusion Once    0.9%  NaCl infusion PRN    0.9%  NaCl infusion PRN    0.9%  NaCl infusion Once    acetaminophen oral solution 999.0148 mg TID    cetirizine tablet 5 mg Daily    chlorhexidine 0.12 % solution 15 mL BID    epoetin jose miguel injection 4,000 Units Every Mon, Wed, Fri    escitalopram oxalate tablet 20 mg Daily    famotidine tablet 20 mg QHS    heparin (porcine) injection 1,000 Units PRN    heparin (porcine) injection 5,000 Units Q12H    insulin aspart U-100 pen 0-4 Units Q4H PRN    insulin regular (Humulin R) 100 Units in sodium chloride 0.9% 100 mL infusion Continuous    levothyroxine injection 75 mcg Daily    midodrine tablet 10 mg PRN    midodrine tablet 10 mg TID    ondansetron disintegrating tablet 4 mg Q6H PRN    oxyCODONE 5 mg/5 mL solution 5 mg Q6H PRN    polyethylene glycol packet 17 g BID    povidone-iodine 10 % ointment PRN    predniSONE tablet 5 mg Daily    QUEtiapine tablet 50 mg QHS    ramelteon tablet 8 mg Nightly PRN    tacrolimus capsule 1 mg BID       Objective:     Vital Signs (Most Recent):  Temp: 97.9 °F (36.6 °C) (04/25/18 0840)  Pulse: 101 (04/25/18 1134)  Resp: 18 (04/25/18 1115)  BP: 99/73 (04/25/18 1115)  SpO2: 100 % (04/25/18 1115)  O2 Device (Oxygen Therapy): Trach Collar (04/25/18 1115) Vital Signs (24h Range):  Temp:  [97.6 °F (36.4  °C)-98.5 °F (36.9 °C)] 97.9 °F (36.6 °C)  Pulse:  [100-106] 101  Resp:  [18-20] 18  SpO2:  [97 %-100 %] 100 %  BP: ()/(46-73) 99/73     Weight: 75.9 kg (167 lb 5.3 oz) (04/25/18 0500)  Body mass index is 26.21 kg/m².  Body surface area is 1.89 meters squared.    I/O last 3 completed shifts:  In: 408.8 [I.V.:8.8; NG/GT:400]  Out: -     Physical Exam   Constitutional: He appears well-developed. No distress.   Trach   HENT:   Head: Normocephalic and atraumatic.   Eyes: Conjunctivae and EOM are normal.   Cardiovascular: Regular rhythm.  Tachycardia present.    Pulmonary/Chest: He has no wheezes. He has no rales.   L IJ TDC   Abdominal: Soft. He exhibits no distension.   LUQ PEG   Neurological: He is alert.       Significant Labs:  All labs within the past 24 hours have been reviewed.     Significant Imaging:  Labs: Reviewed    Assessment/Plan:     * Acute renal failure with acute tubular necrosis superimposed on stage 3 chronic kidney disease    - baseline sCr 2.6-3.0 consistent with CKD stage IV  - anuric CACHORRO; likely ischemic ATN from prolonged pre-renal state (diarrhea) in setting of prograf renal vasoconstriction; cannot r/o septic ATN or Prograf toxicity  - SLED started 3/14. TDC placed 4/4/18.   - remains anuric  -Patient seen in [facility]. Tolerating 1 L UF. Albumin and midodrine PRN. Continue MWF HD while inpatient.   -Per Physical Med & Rehab, patient approve for [facility] (opens May 1) when off insulin gtt. SW to assisting to outpatient HD that will take patient with Trach.              Thank you for your consult. I will follow-up with patient. Please contact us if you have any additional questions.    Amanda Cuellar NP  Nephrology  [facility]

## 2018-04-25 NOTE — SUBJECTIVE & OBJECTIVE
"Interval Hx: Patient Seen in dialysis . Patient complains of pain to toes. States he is able to participate in PT. Heel protector boots in place.     Scheduled Meds:   sodium chloride 0.9%   Intravenous Once    sodium chloride 0.9%   Intravenous Once    acetaminophen  999.0148 mg Per G Tube TID    cetirizine  5 mg Per G Tube Daily    chlorhexidine  15 mL Mouth/Throat BID    epoetin jose miguel (PROCRIT) injection  4,000 Units Intravenous Every Mon, Wed, Fri    escitalopram oxalate  20 mg Per G Tube Daily    famotidine  20 mg Per G Tube QHS    heparin (porcine)  5,000 Units Subcutaneous Q12H    levothyroxine  75 mcg Intravenous Daily    midodrine  10 mg Oral TID    polyethylene glycol  17 g Oral BID    predniSONE  5 mg Per G Tube Daily    QUEtiapine  50 mg Oral QHS    tacrolimus  1 mg Sublingual BID     Continuous Infusions:   insulin (HUMAN R) infusion (adults) 1.2 Units/hr (04/25/18 1013)     PRN Meds:sodium chloride 0.9%, sodium chloride 0.9%, heparin (porcine), insulin aspart U-100, midodrine, ondansetron, oxyCODONE, povidone-iodine, ramelteon    Review of patient's allergies indicates:   Allergen Reactions    No known drug allergies         Past Medical History:   Diagnosis Date    Anxiety     Arthritis     Ascites     Thought to be 2/2 heart tpx; liver bx 3/31/17 w/o significant fibrosis    Cardiomyopathy     Depression     Controlled "for the most part" on Lexipro    Encounter for blood transfusion     during transplant    GERD (gastroesophageal reflux disease)     Hashimoto's disease     History of CHF (congestive heart failure)     Previously EF 20% (prior to heart transplant); last EF 60%, PAP 41 as of 12/12/17; throught to be 2/2 genetics & DM1    History of coronary artery disease     H/o Coronary artery disease; resolved since heart transplant 2014    History of myocardial infarction     h/o MI x3 prior to heart transplant    HLD (hyperlipidemia) 6/11/2015    Hx of psychiatric care "     Hypoglycemia unawareness in type 1 diabetes mellitus     Hypothyroid     Initially hyperthyroid 2/2 Hoshimoto's thyroiditis; now on levothyroxine 150 mcg qd    Pleural effusion due to another disorder     Thought to be 2/2 heart tpx; s/p PleuRx catheter placement and removal after 1-2 months    Psychiatric problem     Pulmonary hypertension assoc with unclear multi-factorial mechanisms 12/12/2017    PAP 41 (EF 60%) on ECHO 12/12/17    Syncope 6/30/2015    Type I diabetes mellitus, well controlled     Well controlled; Dx'd @7y/o; on N insulin 20U BID & Humalog 10U w/meals +SSI; Glu 89 11/9/17; A1c 7.2% 4/5/17    Unspecified essential hypertension 05/29/2014    Well controlled; Last /57 on 11/9/17     Past Surgical History:   Procedure Laterality Date    CARDIAC CATHETERIZATION      CARDIAC SURGERY      CHOLECYSTECTOMY      COLONOSCOPY N/A 3/13/2018    Procedure: COLONOSCOPY;  Surgeon: Tim Spencer MD;  Location: [facility] ([address] [place] [address]);  Service: Endoscopy;  Laterality: N/A;    CORONARY ANGIOPLASTY      FOOT SPLIT TRANSFER OF THE POSTERIOR TIBIALIS TENDON PROCEDURE      HEART TRANSPLANT  2014    KNEE SURGERY      PleuRx catheter placement  01/11/2018    Plan for removal after 1-2 months by Dr. James in cardiothoracic surgery    s/p oht  November 2014    stent placemnet         Family History     Problem Relation (Age of Onset)    Cancer Brother (50)    Diabetes Mother, Father, Brother, Son, Sister, Maternal Uncle, Paternal Aunt, Maternal Grandmother, Maternal Grandfather    Heart disease Mother, Brother    Hypertension Mother, Father, Brother    Kidney disease Mother, Father    Stroke Father, Brother    Vision loss Brother        Social History Main Topics    Smoking status: Never Smoker    Smokeless tobacco: Never Used    Alcohol use No    Drug use: No    Sexual activity: Yes     Partners: Female     Review of Systems   Unable to perform ROS: Mental status change     Objective:      Vital Signs (Most Recent):  Temp: 97.9 °F (36.6 °C) (04/25/18 0840)  Pulse: 105 (04/25/18 1015)  Resp: 18 (04/25/18 1015)  BP: 114/66 (04/25/18 1015)  SpO2: 100 % (04/25/18 1015) Vital Signs (24h Range):  Temp:  [97.6 °F (36.4 °C)-98.5 °F (36.9 °C)] 97.9 °F (36.6 °C)  Pulse:  [100-108] 105  Resp:  [18-20] 18  SpO2:  [95 %-100 %] 100 %  BP: ()/(51-66) 114/66     Weight: 75.9 kg (167 lb 5.3 oz)  Body mass index is 26.21 kg/m².    Foot Exam    General  Orientation: disoriented       Right Foot/Ankle     Inspection and Palpation  Tenderness: great toe metatarsophalangeal joint   Skin Exam: skin changes and abnormal color;     Neurovascular  Dorsalis pedis: 1+  Posterior tibial: 1+  Saphenous nerve sensation: diminished  Tibial nerve sensation: diminished  Superficial peroneal nerve sensation: diminished  Deep peroneal nerve sensation: diminished  Sural nerve sensation: diminished      Left Foot/Ankle      Inspection and Palpation  Tenderness: great toe interphalangeal joint and great toe metatarsophalangeal joint   Skin Exam: skin changes;     Neurovascular  Dorsalis pedis: 1+  Posterior tibial: 1+  Saphenous nerve sensation: diminished  Tibial nerve sensation: diminished  Superficial peroneal nerve sensation: diminished  Deep peroneal nerve sensation: diminished  Sural nerve sensation: diminished          Gangrene noted to hallux and 3rd toe. Stable no purulent drainage noted. Ischemic changes noted to right second toe. Pain upon palpation to right hallux and 3rd toe.                   Mild ischemic changes noted to left hallux. CFT <3s. No purulent drainage noted.     Left foot.                       Laboratory:  CBC:     Recent Labs  Lab 04/25/18  0505   WBC 3.29*   RBC 2.50*   HGB 7.7*   HCT 25.5*      *   MCH 30.8   MCHC 30.2*     CMP:     Recent Labs  Lab 04/25/18  0505   *   CALCIUM 9.6   ALBUMIN 2.0*   PROT 5.9*   *   K 3.7   CO2 24   CL 94*   BUN 45*   CREATININE 3.3*    ALKPHOS 186*   ALT 10   AST 16   BILITOT 0.3       Diagnostic Results:  Xray: Left: No fracture dislocation bone destruction seen.  There is mild DJD.  There are vascular changes suggesting diabetes.  There are spurs on the calcaneus.    Right: There is hardware consistent with talocalcaneal arthrodesis.  There are vascular calcification suggesting diabetes.  No fracture dislocation bone destruction seen.    Clinical Findings:  Dry stable gangrene noted to right hallux and 3rd toe. Ischemic changes noted to right 2nd and left hallux.

## 2018-04-25 NOTE — SUBJECTIVE & OBJECTIVE
"Interval HPI:   Overnight events:  Trach in place.   TF infusing 40cc/hr continuously (max residual 20cc/hr)   BG above goal and requiring correction q4hr   On CRRT  On PDN 5mg daily   Low normal BP noted   Remains IV LT4 75mcg daily    BP (!) 101/56 (BP Location: Right arm, Patient Position: Lying)   Pulse 101   Temp 97.6 °F (36.4 °C) (Oral)   Resp 18   Ht 5' 7" (1.702 m)   Wt 75.9 kg (167 lb 5.3 oz)   SpO2 98%   BMI 26.21 kg/m²     Labs Reviewed and Include      Recent Labs  Lab 04/25/18  0505   *   CALCIUM 9.6   ALBUMIN 2.0*   PROT 5.9*   *   K 3.7   CO2 24   CL 94*   BUN 45*   CREATININE 3.3*   ALKPHOS 186*   ALT 10   AST 16   BILITOT 0.3     Lab Results   Component Value Date    WBC 3.29 (L) 04/25/2018    HGB 7.7 (L) 04/25/2018    HCT 25.5 (L) 04/25/2018     (H) 04/25/2018     04/25/2018       Recent Labs  Lab 04/21/18  0334   TSH 7.085*   FREET4 0.87     Lab Results   Component Value Date    HGBA1C 5.1 04/05/2018       Nutritional status:   Body mass index is 26.21 kg/m².  Lab Results   Component Value Date    ALBUMIN 2.0 (L) 04/25/2018    ALBUMIN 1.9 (L) 04/24/2018    ALBUMIN 2.1 (L) 04/23/2018     Lab Results   Component Value Date    PREALBUMIN 13 (L) 04/08/2018    PREALBUMIN 5 (L) 03/15/2018    PREALBUMIN 18 (L) 03/24/2015       Estimated Creatinine Clearance: 26.7 mL/min (A) (based on SCr of 3.3 mg/dL (H)).    Accu-Checks  Recent Labs      04/23/18   1624  04/23/18   2058  04/24/18   0055  04/24/18   0458  04/24/18   0915  04/24/18   1256  04/24/18   1743  04/24/18   2107  04/25/18   0102  04/25/18   0507   POCTGLUCOSE  228*  288*  251*  283*  232*  248*  196*  164*  184*  254*       Current Medications and/or Treatments Impacting Glycemic Control  Immunotherapy:  Immunosuppressants         Stop Route Frequency     tacrolimus capsule 1 mg      -- SL 2 times daily        Steroids:   Hormones     Start     Stop Route Frequency Ordered    04/19/18 0900  predniSONE tablet 5 " mg      -- PER G TUBE Daily 04/18/18 1106        Pressors:    Autonomic Drugs     Start     Stop Route Frequency Ordered    04/25/18 0900  midodrine tablet 10 mg      -- Oral 3 times daily 04/25/18 0650    04/13/18 0859  midodrine tablet 10 mg      -- Oral As needed (PRN) 04/13/18 0800        Hyperglycemia/Diabetes Medications: Antihyperglycemics     Start     Stop Route Frequency Ordered    04/11/18 0015  insulin regular (Humulin R) 100 Units in sodium chloride 0.9% 100 mL infusion     Question:  Insulin Rate Adjustment (DO NOT MODIFY ANSWER)  Answer:  [email or web address]    -- IV Continuous 04/10/18 2313    04/10/18 2221  insulin aspart U-100 pen 0-4 Units      -- SubQ Every 4 hours PRN 04/10/18 2122

## 2018-04-25 NOTE — PROGRESS NOTES
Dialysis completed. Left IJ tunneled catheter flushed with normal saline, heparinized , capped and taped. Patient dialyzed for 3.5 hours with fluid removal of 1 liter.  Albumin 25 gms iv and Midodrine,  10 mg po given for blood pressure support as ordered. Stable vital signs. Report called to Candance,RN.

## 2018-04-25 NOTE — PLAN OF CARE
Problem: Occupational Therapy Goal  Goal: Occupational Therapy Goal  Goals to be met by: 5/8/18    Pt will follow 75% of motor commands with <2 verbal cues for repetition of task to maximize engagement in grooming task.--MET  Pt will complete feeding with mod A.   Pt will complete supine with HOB slightly elevated to seated at EOB with mod A in prep for seated grooming task.  Pt will engage in BUE exercises in orders to increase strength to 3+/5 in BUE's to increase engagement in seated grooming task  Pt will sit at EOB for approx 10 minutes with mod A and unilateral UE support to complete grooming task  Pt will complete sit>stand from EOB with bed in lowest position with mod A in prep for transfer to [facility]     Outcome: Ongoing (interventions implemented as appropriate)  Goals extended to 5/8/18  Pt progressing.  Delia To, OTR

## 2018-04-25 NOTE — ASSESSMENT & PLAN NOTE
Nutrition Problem  Inadequate energy intake    Related to (etiology):   Inability to consume sufficient energy    Signs and Symptoms (as evidenced by):   NPO with no alternate means of nutrition     Nutrition Diagnosis Status:   Resolved

## 2018-04-25 NOTE — PROGRESS NOTES
" [facility]  Adult Nutrition  Progress Note    SUMMARY       Recommendations    Recommendation/Intervention: Continue Novasource Renal @ 40mL/hr to provide 100% EEN/EPN.   -Patient has no free water flushes ordered. Fluid recommendations for HD are 1000mL + urine output. TF is providing 688mL fluid. Recommend adding 100mL H20 TID.   -Arginaid supplement is ordered with TF for wound healing but is not being given due to no FWF. Please give Arginaid BID with water flushes.   -Hold TF for nausea/vomiting and residuals > 500mL.   -Noted possible gastroparesis. This TF formula has no fiber in it therefore is okay for gastroparesis. If slow GI motility resolves, recommend adding psyllium BID. RD following.     Goals: Meet % EEN, EPN  Nutrition Goal Status: goal met  Communication of RD Recs: reviewed with RN    Reason for Assessment    Reason for Assessment: RD follow-up  Diagnosis: other (see comments) (Resp. fx)  Relevant Medical History: Hashimoto's disease, HTN, HLD, DM, CHF, Heart tx (2014)  Interdisciplinary Rounds: did not attend  General Information Comments: Pt is HD at visit, TF has been running at goal rate of 40mL/hr regularly, discussed importance of it running at goal with wife; per RN no flushes are being given due to pt on HD - therefore arginaid not given either  Nutrition Discharge Planning: Tolerance of TF    Nutrition Risk Screen    Nutrition Risk Screen: large or nonhealing wound, burn or pressure ulcer, dysphagia or difficulty swallowing, tube feeding or parenteral nutrition    Nutrition/Diet History    Patient Reported Diet/Restrictions/Preferences: other (see comments) (HAO)  Do you have any cultural, spiritual, Methodist conflicts, given your current situation?: none  Factors Affecting Nutritional Intake: altered gastrointestinal function, difficulty/impaired swallowing, NPO    Anthropometrics    Temp: 97.9 °F (36.6 °C)  Height Method: Stated  Height: 5' 7" (170.2 " cm)  Height (inches): 67 in  Weight Method: Bed Scale  Weight: 75.9 kg (167 lb 5.3 oz)  Weight (lb): 167.33 lb  Ideal Body Weight (IBW), Male: 148 lb  % Ideal Body Weight, Male (lb): 111.43 lb  BMI (Calculated): 25.9  BMI Grade: 25 - 29.9 - overweight  Usual Body Weight (UBW), kg:  (HAO)       Lab/Procedures/Meds    Pertinent Labs Reviewed: reviewed  Pertinent Labs Comments: Na 132, BUN 45, Cr 3.3, GFR 21.5  Pertinent Medications Reviewed: reviewed  Pertinent Medications Comments: prednisone, tacrolimus, epoetin, heparin    Physical Findings/Assessment    Overall Physical Appearance: underweight, other (see comments) (On trach collar)  Tubes: gastrostomy tube  Oral/Mouth Cavity: WDL  Skin: incision(s)    Estimated/Assessed Needs    Weight Used For Calorie Calculations: 66.9 kg (147 lb 7.8 oz)  Energy Calorie Requirements (kcal): 1896 (1.25x PAL)  Energy Need Method: [place]-St Jeor (1.25 PAL)  Protein Requirements: 80-94g (1.2-1.4 g/kg)  Weight Used For Protein Calculations: 66.9 kg (147 lb 7.8 oz)  Fluid Requirements (mL): 80-94  Fluid Need Method: other (see comments) (Per MD or 1 mL/kcal)  RDA Method (mL): 1896  CHO Requirement: 50% total kcals      Nutrition Prescription Ordered    Current Diet Order: NPO  Current Nutrition Support Formula Ordered: Novasource Renal  Current Nutrition Support Rate Ordered: 40 (ml)  Current Nutrition Support Frequency Ordered: mL/hr    Evaluation of Received Nutrient/Fluid Intake    Enteral Calories (kcal): 1920  Enteral Protein (gm): 87  Enteral (Free Water) Fluid (mL): 688  Other Calories (kcal): 0  % Kcal Needs: 100  % Protein Needs: 100  IV Fluid (mL): 0  Energy Calories Required: meeting needs  Protein Required: meeting needs  Fluid Required: not meeting needs  Comments: LBM 4/21  Tolerance: tolerating  % Intake of Estimated Energy Needs: 75 - 100 %  % Meal Intake: NPO    Nutrition Risk    Level of Risk/Frequency of Follow-up:  (FU 1x/week)     Assessment and Plan    Severe  malnutrition    Contributing Nutrition Diagnosis  Inadequate oral intake    Related to (etiology):  Tracheostomy 2' ARF    Signs and Symptoms (as evidenced by):   40# weight loss in past 3 months, on continues TF - stopped frequently     Interventions/Recommendations (treatment strategy):  See recs    Nutrition Diagnosis Status:   Improving          Acute respiratory failure with hypoxia      Nutrition Problem  Inadequate energy intake    Related to (etiology):   Inability to consume sufficient energy    Signs and Symptoms (as evidenced by):   NPO with no alternate means of nutrition     Nutrition Diagnosis Status:   Resolved                 Monitor and Evaluation    Food and Nutrient Intake: enteral nutrition intake  Food and Nutrient Adminstration: enteral and parenteral nutrition administration  Physical Activity and Function: nutrition-related ADLs and IADLs  Anthropometric Measurements: weight, weight change  Biochemical Data, Medical Tests and Procedures: lipid profile, inflammatory profile, glucose/endocrine profile, gastrointestinal profile, electrolyte and renal panel  Nutrition-Focused Physical Findings: overall appearance     Nutrition Follow-Up    RD Follow-up?: Yes

## 2018-04-25 NOTE — SUBJECTIVE & OBJECTIVE
Interval History: No complaints overnight other than lingering cough (non productive). Seen and examined in dialysis. Will reassess once back on the floor. Was very happy to be taken outside.    Continuous Infusions:   insulin (HUMAN R) infusion (adults) 1.2 Units/hr (04/25/18 1013)     Scheduled Meds:   sodium chloride 0.9%   Intravenous Once    sodium chloride 0.9%   Intravenous Once    acetaminophen  999.0148 mg Per G Tube TID    cetirizine  5 mg Per G Tube Daily    chlorhexidine  15 mL Mouth/Throat BID    epoetin jose miguel (PROCRIT) injection  4,000 Units Intravenous Every Mon, Wed, Fri    escitalopram oxalate  20 mg Per G Tube Daily    famotidine  20 mg Per G Tube QHS    heparin (porcine)  5,000 Units Subcutaneous Q12H    levothyroxine  75 mcg Intravenous Daily    midodrine  10 mg Oral TID    polyethylene glycol  17 g Oral BID    predniSONE  5 mg Per G Tube Daily    QUEtiapine  50 mg Oral QHS    tacrolimus  1 mg Sublingual BID     PRN Meds:sodium chloride 0.9%, sodium chloride 0.9%, heparin (porcine), insulin aspart U-100, midodrine, ondansetron, oxyCODONE, povidone-iodine, ramelteon    Review of patient's allergies indicates:   Allergen Reactions    No known drug allergies      Objective:     Vital Signs (Most Recent):  Temp: 97.9 °F (36.6 °C) (04/25/18 0840)  Pulse: 101 (04/25/18 1134)  Resp: 18 (04/25/18 1115)  BP: (!) 98/57 (04/25/18 1145)  SpO2: 100 % (04/25/18 1115) Vital Signs (24h Range):  Temp:  [97.6 °F (36.4 °C)-98.5 °F (36.9 °C)] 97.9 °F (36.6 °C)  Pulse:  [100-106] 101  Resp:  [18-20] 18  SpO2:  [97 %-100 %] 100 %  BP: ()/(46-73) 98/57     Patient Vitals for the past 72 hrs (Last 3 readings):   Weight   04/25/18 0500 75.9 kg (167 lb 5.3 oz)     Body mass index is 26.21 kg/m².    No intake or output data in the 24 hours ending 04/25/18 1201    Hemodynamic Parameters:       Physical Exam   Constitutional: He is oriented to person, place, and time.   Generalized weakness but normal  ROM   Neck: No JVD present. Trache in place and well  secured  Cardiovascular: Normal rate, regular rhythm and normal heart sounds.    Pulmonary/Chest: Effort normal and breath sounds normal.   Abdominal: Soft. Bowel sounds are normal. Mild diffuse TTP with deep palpation  Musculoskeletal: Normal range of motion. He exhibits no edema or tenderness.   Neurological: He is alert and oriented to person, place, and time.   Skin: Skin is warm and dry.   Psychiatric: - able to interact and ask questions this morning  Nursing note and vitals reviewed.    Significant Labs:  CBC:    Recent Labs  Lab 04/23/18  0615 04/24/18  0532 04/25/18  0505   WBC 2.71* 3.06* 3.29*   RBC 2.57* 2.39* 2.50*   HGB 8.0* 7.5* 7.7*   HCT 25.7* 24.2* 25.5*    194 208   * 101* 102*   MCH 31.1* 31.4* 30.8   MCHC 31.1* 31.0* 30.2*     BNP:  No results for input(s): BNP in the last 168 hours.    Invalid input(s): BNPTRIAGELBLO  CMP:    Recent Labs  Lab 04/23/18  0615 04/24/18  0532 04/25/18  0505   * 261* 222*   CALCIUM 10.6* 9.2 9.6   ALBUMIN 2.1* 1.9* 2.0*   PROT 6.2 5.7* 5.9*   * 134* 132*   K 4.6 3.4* 3.7   CO2 18* 23 24   CL 99 96 94*   BUN 50* 33* 45*   CREATININE 3.9* 2.4* 3.3*   ALKPHOS 199* 203* 186*   ALT 10 9* 10   AST 13 13 16   BILITOT 0.3 0.3 0.3      Coagulation:   No results for input(s): PT, INR, APTT in the last 168 hours.  LDH:  No results for input(s): LDH in the last 72 hours.  Microbiology:  Microbiology Results (last 7 days)     Procedure Component Value Units Date/Time    Fungus culture [[id number]] Collected:  03/14/18 1137    Order Status:  Completed Specimen:  Bronchial Wash from Amniotic Fluid Updated:  04/18/18 1303     Fungus (Mycology) Culture No fungus isolated after 4 weeks        I have reviewed all pertinent labs within the past 24 hours.    Estimated Creatinine Clearance: 26.7 mL/min (A) (based on SCr of 3.3 mg/dL (H)).    Diagnostic Results:

## 2018-04-25 NOTE — PLAN OF CARE
Problem: Patient Care Overview  Goal: Plan of Care Review  Outcome: Ongoing (interventions implemented as appropriate)  Pt AAOx4 throughout shift. Pt went to Dialysis today.  Pt had dialysis for 3.5 hours and 1 liter of fluid was removed.  Pt tolerated well. Pt had cough today. Ian SKY was notified new orders noted. Pt had 3 bm today. Insulin drip was increased to 1.2u/hr. Pt tolerating well. Pt remained free from falls and injuries during shift. NADN. Will continue to monitor.

## 2018-04-25 NOTE — PLAN OF CARE
Problem: SLP Goal  Goal: SLP Goal  Speech Language Pathology Goals  Goals expected to be met by 4/27/18  1.  Pt will tolerate PMSV for 30 minutes w/ no change in respiratory status and fair voicing produced.  2.  Pt will participate in ongoing swallow assessment   3. Pt will name up to 12 items/minute in a concrete category, 90% of attempts, Supervision, to improve cognitive organization  4. Pt will recall 3 related items post 3 minute filled delay, 90% of attempts, Supervision, to improve STM   5. Pt will answer m/u YNQ with 90% accuracy, Supervision, to improve higher-level comprehension   6. Pt will complete further assessment of reading/writing skills  7. Educate Pt on aspiration precautions and S/S aspiration  8. Educate Pt and family on PMSV precautions and safety awareness     Goals expected to be met by 4/20/18  1.  Pt will tolerate PMSV for 30 minutes w/ no change in respiratory status and fair voicing produced.  2.  Pt will complete further evaluation of cognitive-linguistic communication skills to determine the need for additional goals. Met 4/19  3. Pt will name up to 12 items/minute in a concrete category, 90% of attempts, Supervision, to improve cognitive organization  4. Pt will recall 3 related items post 3 minute filled delay, 90% of attempts, Supervision, to improve STM   5. Pt will answer m/u YNQ with 90% accuracy, Supervision, to improve higher-level comprehension   6. Pt will complete further assessment of reading/writing skills  7. Pending MD clearance, Pt will complete further assessment of swallow fx   8. Educate Pt and family on PMSV precautions and safety awareness      Goals expected to be met by 4/13/18     1.  Pt will tolerate PMSV for 3 min w/ no change in respiratory status and fair voicing produced. Met   2.  Pt will complete further evaluation of cognitive-linguistic communication skills to determine the need for additional goals.           Outcome: Ongoing (interventions implemented  as appropriate)  Pt progressing with goals. Pt with increased sore throat today. MBSS tentatively scheduled for 4/26 at 8:15 am. ST to continue to follow per POC. Thank you.    JOSELYN Delarosa., [facility]  Speech-Language Pathology  Pager: [phone]  4/25/2018

## 2018-04-25 NOTE — PT/OT/SLP PROGRESS
Occupational Therapy   Treatment    Name: Lv Crocker  MRN: [id number]  Admitting Diagnosis:  Acute renal failure with acute tubular necrosis superimposed on stage 3 chronic kidney disease  21 Days Post-Op    Recommendations:     Discharge Recommendations: rehabilitation facility  Discharge Equipment Recommendations:     Barriers to discharge:  Decreased caregiver support, Inaccessible home environment    Subjective     Communicated with: nsg prior to session.cc with PT  Pain/Comfort:  · Pain Rating 1: 0/10    Patients cultural, spiritual, Alevism conflicts given the current situation: none    Objective:     Patient found with: telemetry, PEG Tube, tracheostomy, peripheral IV    General Precautions: Standard, aspiration, fall (skin)   Orthopedic Precautions:N/A   Braces:       Occupational Performance:    Bed Mobility:    · Sup to sit with total assist     Functional Mobility/Transfers:  · Bed to medi chair stand pivot transfer with total assist x 2 (pt attempts to assist as much as able)  · Functional Mobility: sitting balance with max assist, scooting in chair with total assist    Activities of Daily Living:  · Total assist simple grooming    Patient left up in chair with call bell in hand with wife  present    [facility] 6 Click:  [facility] Total Score: 6    Treatment & Education:  Pt transferred to [facility] chair, sat x ~20 min while performing UE/LE exercises and left up in chair after tx. Pt performed BUE AAROM shoulder reaches, elbow flexion and eccentric biceps, hand AROM, all x 10-15 reps. Educated on importance of OOB and up in chair as much as tolerated.   Education:    Assessment:     Lv Crocker is a 42 y.o. male with a medical diagnosis of Acute renal failure with acute tubular necrosis superimposed on stage 3 chronic kidney disease.  He presents with increased strength BUE's.  Performance deficits affecting function are impaired functional mobilty, weakness, impaired endurance, impaired self  care skills, impaired balance, gait instability, decreased lower extremity function, decreased upper extremity function, pain, impaired cardiopulmonary response to activity, impaired skin, decreased ROM.      Rehab Prognosis:  good; patient would benefit from acute skilled OT services to address these deficits and reach maximum level of function.       Plan:     Patient to be seen 5 x/week to address the above listed problems via self-care/home management, therapeutic activities, therapeutic exercises  · Plan of Care Expires: 05/06/18  · Plan of Care Reviewed with: patient, spouse    This Plan of care has been discussed with the patient who was involved in its development and understands and is in agreement with the identified goals and treatment plan    GOALS:    Occupational Therapy Goals        Problem: Occupational Therapy Goal    Goal Priority Disciplines Outcome Interventions   Occupational Therapy Goal     OT, PT/OT Ongoing (interventions implemented as appropriate)    Description:  Goals to be met by: 5/8/18    Pt will follow 75% of motor commands with <2 verbal cues for repetition of task to maximize engagement in grooming task.--MET  Pt will complete feeding with mod A.   Pt will complete supine with HOB slightly elevated to seated at EOB with mod A in prep for seated grooming task.  Pt will engage in BUE exercises in orders to increase strength to 3+/5 in BUE's to increase engagement in seated grooming task  Pt will sit at EOB for approx 10 minutes with mod A and unilateral UE support to complete grooming task  Pt will complete sit>stand from EOB with bed in lowest position with mod A in prep for transfer to [facility]                       Time Tracking:     OT Date of Treatment: 04/24/18  OT Start Time: 1002  OT Stop Time: 1038  OT Total Time (min): 36 min    Billable Minutes:Therapeutic Exercise 36 min    Delia To OT  4/25/2018

## 2018-04-25 NOTE — ASSESSMENT & PLAN NOTE
Lv Crocker is a 42 y.o. 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 Ischemic changes to right second toe and left hallux.   -SULMA's within normal limits  -x-ray not concerning for infection  -Painted with betadine, nursing to paint toes with betadine daily  -Per transplant service, recommend against surgical intervention due to inmunosupressed state.  Agree with transplant service.  Toes stable, no surgical intervention indicated at this time.     DC instructions: patient to follow up with podiatry within one week of discharge.  Patient to have toes painted with betadine daily.    Weightbearing Status: WBAT B/L  Offloading Device: DARCO shoe.

## 2018-04-25 NOTE — PROGRESS NOTES
"[facility]  Endocrinology  Progress Note    Admit Date: 3/11/2018     Reason for Consult: abnormal TFTs    Surgical Procedure and Date: s/p heart transplant 2014     Diabetes diagnosis year: 7yo (T1DM)     Home Diabetes Medications:    Levemir 15u qHS  Novolog 4u AC     How often checking glucose at home? 4 times daily   BG readings on regimen: 150-200s  Hypoglycemia on the regimen?  Yes, rarely - treats appropriately (light snack, glucose tab)  Missed doses on regimen?  No        Diabetes Complications include:     Hyperglycemia, Hypoglycemia  and Diabetic chronic kidney disease          Complicating diabetes co morbidities:   N/A     HPI:   Patient is a 42 y.o. male with a diagnosis of T1DM, HTN, hypothyroidism, s/p heart transplant.  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 He required serial paracentesis and thoracentesis until he underwent a VATS with pleurX catheter placement on 1/11/2018 and removed 2/7/18.       Presented to hospital secondary to diarrhea and cough.  Upon initial labs, TSH was decreased (0.101) and free T4 1.33.  Endocrinology consulted to assist in management of these labs.  He was last seen in clinic by Kamala Vázquez NP 11/2017.   Previous hospitalization, endocrine consulted for same problem.  During that visit, recommended decreasing LT4 to 125mcg daily. Unfortunately, patient was discharged on previous dose of 150mcg daily.     Interval HPI:   Overnight events:  Trach in place.   TF infusing 40cc/hr continuously (max residual 20cc/hr)   BG above goal and requiring correction q4hr   On CRRT  On PDN 5mg daily   Low normal BP noted   Remains IV LT4 75mcg daily    BP (!) 101/56 (BP Location: Right arm, Patient Position: Lying)   Pulse 101   Temp 97.6 °F (36.4 °C) (Oral)   Resp 18   Ht 5' 7" (1.702 m)   Wt 75.9 kg (167 lb 5.3 oz)   SpO2 98%   BMI 26.21 " kg/m²       Labs Reviewed and Include      Recent Labs  Lab 04/25/18  0505   *   CALCIUM 9.6   ALBUMIN 2.0*   PROT 5.9*   *   K 3.7   CO2 24   CL 94*   BUN 45*   CREATININE 3.3*   ALKPHOS 186*   ALT 10   AST 16   BILITOT 0.3     Lab Results   Component Value Date    WBC 3.29 (L) 04/25/2018    HGB 7.7 (L) 04/25/2018    HCT 25.5 (L) 04/25/2018     (H) 04/25/2018     04/25/2018       Recent Labs  Lab 04/21/18  0334   TSH 7.085*   FREET4 0.87     Lab Results   Component Value Date    HGBA1C 5.1 04/05/2018       Nutritional status:   Body mass index is 26.21 kg/m².  Lab Results   Component Value Date    ALBUMIN 2.0 (L) 04/25/2018    ALBUMIN 1.9 (L) 04/24/2018    ALBUMIN 2.1 (L) 04/23/2018     Lab Results   Component Value Date    PREALBUMIN 13 (L) 04/08/2018    PREALBUMIN 5 (L) 03/15/2018    PREALBUMIN 18 (L) 03/24/2015       Estimated Creatinine Clearance: 26.7 mL/min (A) (based on SCr of 3.3 mg/dL (H)).    Accu-Checks  Recent Labs      04/23/18   1624  04/23/18   2058  04/24/18   0055  04/24/18   0458  04/24/18   0915  04/24/18   1256  04/24/18   1743  04/24/18   2107  04/25/18   0102  04/25/18   0507   POCTGLUCOSE  228*  288*  251*  283*  232*  248*  196*  164*  184*  254*       Current Medications and/or Treatments Impacting Glycemic Control  Immunotherapy:  Immunosuppressants         Stop Route Frequency     tacrolimus capsule 1 mg      -- SL 2 times daily        Steroids:   Hormones     Start     Stop Route Frequency Ordered    04/19/18 0900  predniSONE tablet 5 mg      -- PER G TUBE Daily 04/18/18 1106        Pressors:    Autonomic Drugs     Start     Stop Route Frequency Ordered    04/25/18 0900  midodrine tablet 10 mg      -- Oral 3 times daily 04/25/18 0650    04/13/18 0859  midodrine tablet 10 mg      -- Oral As needed (PRN) 04/13/18 0800        Hyperglycemia/Diabetes Medications: Antihyperglycemics     Start     Stop Route Frequency Ordered    04/11/18 0015  insulin regular (Humulin  R) 100 Units in sodium chloride 0.9% 100 mL infusion     Question:  Insulin Rate Adjustment (DO NOT MODIFY ANSWER)  Answer:  \\[id number]    -- IV Continuous 04/10/18 2313    04/10/18 2221  insulin aspart U-100 pen 0-4 Units      -- SubQ Every 4 hours PRN 04/10/18 2122          ASSESSMENT and PLAN    Type 1 diabetes mellitus with stage 3 chronic kidney disease    BG goal 140-180,       Increase transition from 1 to 1.2u/hr, low correction q4hr checks     Pt is T1DM, do not suspend insulin >1 hr   If TF held, decrease insulin rate to 0.4u/hr   (known to have controlled BG on basal needs of lev 5 daily/ 0.2u/hr during this hospitalization)        Discharge Recommendations:  TBD.          On enteral nutrition     TF at goal May increase insulin needs             Current chronic use of systemic steroids     Can increase insulin requirement          Acute respiratory failure with hypoxia     Per primary  S/p trach.  Practicing with speech valve.  Remains NPO          Hypothyroidism due to Hashimoto's thyroiditis     continue iv levothyroxine 75mcg daily  tsh improving, monitor q1-2 weeks        Palmira Dorsey MD  Endocrinology  [facility] Chauhan MD,  have personally taken the history and examined the patient and agree with the resident's note as stated above.

## 2018-04-25 NOTE — PROGRESS NOTES
Patient received from floor in own bed with report from Candance,RN.  Acute dialysis began per orders via left IJ tunneled catheter.

## 2018-04-25 NOTE — ASSESSMENT & PLAN NOTE
- baseline sCr 2.6-3.0 consistent with CKD stage IV  - anuric CACHORRO; likely ischemic ATN from prolonged pre-renal state (diarrhea) in setting of prograf renal vasoconstriction; cannot r/o septic ATN or Prograf toxicity  - SLED started 3/14. TDC placed 4/4/18.   - remains anuric  -Patient seen in [facility]. Tolerating 1 L UF. Albumin and midodrine PRN. Continue MWF HD while inpatient.   -Per Physical Med & Rehab, patient approve for [facility] inpatient rehab (opens May 1) when off insulin gtt. SW to assisting to outpatient HD that will take patient with Trach.

## 2018-04-25 NOTE — ASSESSMENT & PLAN NOTE
- TTE 3/26/18 showed normal graft function but has known history of restrictive CM post transplant.   - Repeat echo reviewed and in epic  - Continue pred 5  - Tacro- current dose is 1/1 SL daily, adjust as needed for goal 6-10  - Cellcept remains on hold due to leukopenia

## 2018-04-26 NOTE — PLAN OF CARE
Problem: Occupational Therapy Goal  Goal: Occupational Therapy Goal  Goals to be met by: 5/8/18    Pt will follow 75% of motor commands with <2 verbal cues for repetition of task to maximize engagement in grooming task.--MET  Pt will complete feeding with mod A.   Pt will complete supine with HOB slightly elevated to seated at EOB with mod A in prep for seated grooming task.  Pt will engage in BUE exercises in orders to increase strength to 3+/5 in BUE's to increase engagement in seated grooming task  Pt will sit at EOB for approx 10 minutes with mod A and unilateral UE support to complete grooming task  Pt will complete sit>stand from EOB with bed in lowest position with mod A in prep for transfer to [facility]      Outcome: Ongoing (interventions implemented as appropriate)  con't with goals  Delia To, LILYR

## 2018-04-26 NOTE — PROGRESS NOTES
[facility]  Heart Transplant  Progress Note    Patient Name: Lv Crocker  MRN: [id number]  Admission Date: 3/11/2018  Hospital Length of Stay: 45 days  Attending Physician: Behzad Lara Jr.,*  Primary Care Provider: Philippe Mohr MD  Principal Problem:Acute renal failure with acute tubular necrosis superimposed on stage 3 chronic kidney disease    Subjective:     Interval History: Did not sleep well last night, still w/ cough though now productive of thin clear sputum (though cough is better today). Some anxiety overnight, given xanex and put back on 5 L via trach collar due to anxiousness and sat 90%. Feels better this AM but states he keeps getting anxious at night and after dialysis regularly.  Very happy he passed his swallow study this AM.    Continuous Infusions:   insulin (HUMAN R) infusion (adults) 1.2 Units/hr (04/26/18 0550)     Scheduled Meds:   sodium chloride 0.9%   Intravenous Once    acetaminophen  999.0148 mg Per G Tube TID    cetirizine  5 mg Per G Tube Daily    chlorhexidine  15 mL Mouth/Throat BID    epoetin jose miguel (PROCRIT) injection  4,000 Units Intravenous Every Mon, Wed, Fri    escitalopram oxalate  20 mg Per G Tube Daily    famotidine  20 mg Per G Tube QHS    heparin (porcine)  5,000 Units Subcutaneous Q12H    levothyroxine  75 mcg Intravenous Daily    midodrine  10 mg Oral TID    polyethylene glycol  17 g Oral BID    predniSONE  5 mg Per G Tube Daily    QUEtiapine  50 mg Oral QHS    tacrolimus  1 mg Sublingual BID     PRN Meds:sodium chloride 0.9%, sodium chloride 0.9%, ALPRAZolam, dextromethorphan-guaifenesin  mg/5 ml, heparin (porcine), insulin aspart U-100, midodrine, ondansetron, oxyCODONE, povidone-iodine, ramelteon    Review of patient's allergies indicates:   Allergen Reactions    No known drug allergies      Objective:     Vital Signs (Most Recent):  Temp: 98.6 °F (37 °C) (04/26/18 1233)  Pulse: 108 (04/26/18 1233)  Resp: 18  (04/26/18 1233)  BP: (!) 104/56 (04/26/18 1233)  SpO2: (!) 94 % (04/26/18 1233) Vital Signs (24h Range):  Temp:  [98.5 °F (36.9 °C)-98.8 °F (37.1 °C)] 98.6 °F (37 °C)  Pulse:  [104-111] 108  Resp:  [16-18] 18  SpO2:  [91 %-100 %] 94 %  BP: ()/(52-62) 104/56     Patient Vitals for the past 72 hrs (Last 3 readings):   Weight   04/26/18 0545 74.9 kg (165 lb 2 oz)   04/25/18 0500 75.9 kg (167 lb 5.3 oz)     Body mass index is 25.86 kg/m².      Intake/Output Summary (Last 24 hours) at 04/26/18 1237  Last data filed at 04/26/18 0300   Gross per 24 hour   Intake           974.96 ml   Output                0 ml   Net           974.96 ml     Hemodynamic Parameters:    Physical Exam   Constitutional: He is oriented to person, place, and time.   Generalized weakness but normal ROM   Neck: No JVD present. Trache in place and well  secured  Cardiovascular: Normal rate, regular rhythm and normal heart sounds.    Pulmonary/Chest: Effort normal and breath sounds decreased at RLB, no wheezes noted.   Abdominal: Soft. Bowel sounds are normal. Mild diffuse TTP with deep palpation  Musculoskeletal: Normal range of motion. He exhibits no edema or tenderness.   Neurological: He is alert and oriented to person, place, and time.   Skin: Skin is warm and dry.   Psychiatric: - able to interact and ask questions this morning  Nursing note and vitals reviewed.    Significant Labs:  CBC:    Recent Labs  Lab 04/24/18  0532 04/25/18  0505 04/26/18  0430   WBC 3.06* 3.29* 4.51   RBC 2.39* 2.50* 2.41*   HGB 7.5* 7.7* 7.6*   HCT 24.2* 25.5* 24.8*    208 235   * 102* 103*   MCH 31.4* 30.8 31.5*   MCHC 31.0* 30.2* 30.6*     BNP:  No results for input(s): BNP in the last 168 hours.    Invalid input(s): BNPTRIAGELBLO  CMP:    Recent Labs  Lab 04/24/18  0532 04/25/18  0505 04/26/18  0430   * 222* 163*   CALCIUM 9.2 9.6 8.8   ALBUMIN 1.9* 2.0* 2.4*   PROT 5.7* 5.9* 6.0   * 132* 131*   K 3.4* 3.7 3.7   CO2 23 24 21*   CL 96  94* 97   BUN 33* 45* 30*   CREATININE 2.4* 3.3* 2.2*   ALKPHOS 203* 186* 199*   ALT 9* 10 9*   AST 13 16 15   BILITOT 0.3 0.3 0.4      Coagulation:   No results for input(s): PT, INR, APTT in the last 168 hours.  LDH:  No results for input(s): LDH in the last 72 hours.  Microbiology:  Microbiology Results (last 7 days)     Procedure Component Value Units Date/Time    Respiratory Viral Panel by PCR [facility]; Nasal Swab [[id number]] Collected:  04/26/18 1123    Order Status:  Sent Specimen:  Respiratory Updated:  04/26/18 1138    AFB Culture & Smear [[id number]] Collected:  03/14/18 1137    Order Status:  Completed Specimen:  Bronchial Wash from Amniotic Fluid Updated:  04/26/18 0927     AFB Culture & Smear Culture in progress     AFB Culture & Smear Culture in progress     AFB CULTURE STAIN No acid fast bacilli seen.    Culture, Respiratory with Gram Stain [[id number]]     Order Status:  No result Specimen:  Respiratory from Tracheal Aspirate         I have reviewed all pertinent labs within the past 24 hours.    Estimated Creatinine Clearance: 40.1 mL/min (A) (based on SCr of 2.2 mg/dL (H)).    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 Most recently has had ongoing issues with his lungs, had gotten 2 thoracenteses, after which he underwent VATS 01/19/18 followed by pleurex catheter placement and effusion drainage 01/11/18, subsequently had it removed 02/07/18 after drainage had decreased despite multiple attempts to drain it. Has been having significant coughing fits that result in him being near-syncopal at times, although difficult to say if he has passed out or not- patient most recently was admitted to the hospital for increased AGUILAR, coughing and pre-syncope 02/11-02/14/18 at [facility].   Patient was  started on antibiotics for suspected bacterial infection, with some diarrhea, bronchitis, and possible pneumonia. Ct chest showed some pleural fluid collection and after talking with Dr. James, we arranged for him to receive a diagnostic tap with IR, from which the fluid collection demonstrated no growth or significant findings at all really. Cell counts do not appear to have been sent but will recheck. Patient states since his hospital stay, yesterday had a significant coughing fit again, after which he nearly passed out, is being seen in clinic today for this. Denies any cardiopulmonary complaints, no leg swelling, has some abdominal swelling, which is moderate for him. Denies significant shortness of breath with mild exertion, had 6MWT yesterday to see if he qualified for home O2, and his O2 sats actually improved after recovery from where he started initially. He and his wife admit he is in bed most days, not very active.     Mr. Crocker presented to the ED 3/11/18 with approximately 3 weeks of worsening diarrhea and 2-3 days of sinus pressure, drainage, and worsened cough.  He notes that he had a coughing fit last night and passed out, coughed throughout most of the night.  This also happened on 2/25/18.  He last saw Dr Ferreira in clinic on 2/20/18 where he was taken off myfortic and switched to cellcept (he hadn't been on cellcept because of leukopenia when they tried post-transplant).  Though his diarrhea had improved after his last discharge, he states it has increased now to 15x/day, clear/yellow/green, no fevers, no exacerbating foods per say.  He was taken back off the cellcept and placed back on myfortic this past week and has not noticed immediate change in diarrhea. He also reports his baseline coughing fits havent improved and are minimally responsive to tessalon pearls.  Came on last night, he lost consciousness during a fit once which is relatively normal for him, and had two more during HPI.  He  "notes no sick contacts but does state he's had some URI symptoms as above.  His baseline vitals are usually -120 and BP 90s/60s-110s/70s.  He reports a history of intermittent diarrhea - did have Cdiff many years ago but this smells different.  No abdominal pain, no nausea, no vomiting.  No blood in diarrhea.  Appears last c-scope in 2015 with no evidence of infection or inflammatory processes.  He does report IBS which is different that this.       * Acute renal failure with acute tubular necrosis superimposed on stage 3 chronic kidney disease    - Appreciate Nephrology recs.   - Continue middorine for pressure support  - HD per nephrology. Goal is SLED vs evening HD to allow for more time with therapy and better sleep hygiene.         Gastroparesis    - abdominal pain improved from weekend  - BM x4 on 4/25, passing gas  - KUB without evidence of bowel obstructionunclear if  worsening gastroparesis or new partial obstruction  - Per GI recs patient should tolerate up to 400 mL residuals. Will wean narcotics. Can complete CT AP if needed however pain is mild and improving at this point  - CMV PCR undetected        Alteration in skin integrity    - wound care following        Acute respiratory failure with hypoxia    - Resolved.   - S/P Bronch and intubation 3/14 now post tracheostomy due to inability to extubate  - Pulmonology signed off. Completely weaned off vent.   - RSV positive early and completed course of Ribavarin/IVIG.  - ID had been following. Completed 7 day course of Meropenem/Linezolid earlier in hospitalization  - Appreciate PT/OT/Wound care.  - 4/25 c/o cough with mild clear sputum and "not feeling right" Afebrile with no leukocytosis. CXR reviewed with possible new LLL infiltrate when compared to prior on 4/9. Will repeat Respiratory Viral Panel, and send tracheal sputum cultures. Hold off on abx for now.         Restrictive cardiomyopathy    - No changes from before.   - Has known history of " recurrent ascites and pleural effusions   - S/P thoracentesis X 2, followed by VATS/pleurex cath placement (January 2018) with removal of pleurex (February 2018) and 3rd thoracentesis 2/14/18 with no significant findings on fluid removal.  - repeat echo reviewed and in epic        Type 1 diabetes mellitus with stage 3 chronic kidney disease    - Appreciate Endocrine's recs  - Insulin gtt per endocrine recs. Adjust as needed  - per endocrine please call when he is off tube feeds to adjust ggt and transition to rehab/outpatient regimen        Hypothyroidism due to Hashimoto's thyroiditis    - Appreciate Endocrine's recs  - Continue Levothyroxine 75mcg IV daily        Anemia    - Transfused 2 units of PRBCs on 4/11 during HD  - Nephrology has resumed ProCrit         Heart transplanted    - TTE 3/26/18 showed normal graft function but has known history of restrictive CM post transplant.   - Repeat echo reviewed and in epic  - Continue pred 5  - Tacro- current dose is 1/1 SL daily, adjust as needed for goal 6-10  - Cellcept remains on hold due to leukopenia          Debility    - PT/OT/SLP daily. Recommend rehab.  - Nutrition following.   - Continue tube feeds. Switched from glucerna to novasource renal   - Passed MBSS today with speech. Start regular diet with thin liquids today, but will continue tube feeds at lower rate (30). Dietary to put in more recs today and will adjust pending patient PO intake over the next few days        Anxiety    - Continue PTA  Escitalopram  - Increased Lexapro to 20mg daily per psych recs  - Started Seroquel as per psych rec.  - Add prn xanex for PRN anxiety (low threshold to stop if patient has recurrent hallucinations), may need long acting anti anxiolytic long term            Kelsea Ryan PA-C  Heart Transplant  [facility]

## 2018-04-26 NOTE — SUBJECTIVE & OBJECTIVE
"Interval HPI:   Overnight events:  Trach in place.   TF infusing 40cc/hr continuously, no residuals this shift   BG at or above goal   On CRRT  On PDN 5mg daily   Remains IV LT4 75mcg daily    BP (!) 101/57 (BP Location: Right arm, Patient Position: Lying)   Pulse 105   Temp 98.8 °F (37.1 °C) (Oral)   Resp 18   Ht 5' 7" (1.702 m)   Wt 74.9 kg (165 lb 2 oz)   SpO2 97% Comment: Simultaneous filing. User may not have seen previous data.  BMI 25.86 kg/m²     Labs Reviewed and Include      Recent Labs  Lab 04/26/18  0430   *   CALCIUM 8.8   ALBUMIN 2.4*   PROT 6.0   *   K 3.7   CO2 21*   CL 97   BUN 30*   CREATININE 2.2*   ALKPHOS 199*   ALT 9*   AST 15   BILITOT 0.4     Lab Results   Component Value Date    WBC 4.51 04/26/2018    HGB 7.6 (L) 04/26/2018    HCT 24.8 (L) 04/26/2018     (H) 04/26/2018     04/26/2018       Recent Labs  Lab 04/21/18  0334   TSH 7.085*   FREET4 0.87     Lab Results   Component Value Date    HGBA1C 5.1 04/05/2018       Nutritional status:   Body mass index is 25.86 kg/m².  Lab Results   Component Value Date    ALBUMIN 2.4 (L) 04/26/2018    ALBUMIN 2.0 (L) 04/25/2018    ALBUMIN 1.9 (L) 04/24/2018     Lab Results   Component Value Date    PREALBUMIN 13 (L) 04/08/2018    PREALBUMIN 5 (L) 03/15/2018    PREALBUMIN 18 (L) 03/24/2015       Estimated Creatinine Clearance: 40.1 mL/min (A) (based on SCr of 2.2 mg/dL (H)).    Accu-Checks  Recent Labs      04/24/18   1256  04/24/18   1743  04/24/18   2107  04/25/18   0102  04/25/18   0507  04/25/18   0904  04/25/18   1312  04/25/18   1907  04/25/18   2322  04/26/18   0333   POCTGLUCOSE  248*  196*  164*  184*  254*  172*  146*  248*  237*  164*       Current Medications and/or Treatments Impacting Glycemic Control  Immunotherapy:  Immunosuppressants         Stop Route Frequency     tacrolimus capsule 1 mg      -- SL 2 times daily        Steroids:   Hormones     Start     Stop Route Frequency Ordered    04/19/18 0900  " predniSONE tablet 5 mg      -- PER G TUBE Daily 04/18/18 1106        Pressors:    Autonomic Drugs     Start     Stop Route Frequency Ordered    04/25/18 0900  midodrine tablet 10 mg      -- Oral 3 times daily 04/25/18 0650    04/13/18 0859  midodrine tablet 10 mg      -- Oral As needed (PRN) 04/13/18 0800        Hyperglycemia/Diabetes Medications: Antihyperglycemics     Start     Stop Route Frequency Ordered    04/11/18 0015  insulin regular (Humulin R) 100 Units in sodium chloride 0.9% 100 mL infusion     Question:  Insulin Rate Adjustment (DO NOT MODIFY ANSWER)  Answer:  [email or web address]    -- IV Continuous 04/10/18 2313    04/10/18 2221  insulin aspart U-100 pen 0-4 Units      -- SubQ Every 4 hours PRN 04/10/18 2122

## 2018-04-26 NOTE — NURSING
Notified primary team, pt c/o bladder fullness.  Bladder scan performed, revealed between 110-180 cc.  Verbal instruction received to straight cath patient d/t bladder scan and discomfort.  Straight cath done - 275 cc of cloudy yellow urine expelled, relief of symptoms reported.

## 2018-04-26 NOTE — ASSESSMENT & PLAN NOTE
- PT/OT/SLP daily. Recommend rehab.  - Nutrition following.   - Continue tube feeds. Switched from glucerna to novasource renal   - Passed MBSS today with speech. Start regular diet with thin liquids today, but will continue tube feeds at lower rate (30). Dietary to put in more recs today and will adjust pending patient PO intake over the next few days

## 2018-04-26 NOTE — PT/OT/SLP PROGRESS
Occupational Therapy   Treatment    Name: Lv Crocker  MRN: [id number]  Admitting Diagnosis:  Acute renal failure with acute tubular necrosis superimposed on stage 3 chronic kidney disease  22 Days Post-Op    Recommendations:     Discharge Recommendations: rehabilitation facility  Discharge Equipment Recommendations:     Barriers to discharge:  Decreased caregiver support, Inaccessible home environment    Subjective     Communicated with: nsg prior to session. Cc with PT  Pain/Comfort:  · Pain Rating 1: 0/10    Patients cultural, spiritual, Taoism conflicts given the current situation: none    Objective:     Patient found with: PEG Tube, PCEA, tracheostomy, peripheral IV    General Precautions: Standard, fall   Orthopedic Precautions:N/A   Braces:       Occupational Performance:    Bed Mobility:    · Total assist scooting in supine     Functional Mobility/Transfers:  ·   · Functional Mobility: pt declines OOB at this time as he is waiting on family to eat (passed MBS) and would like to sit up in bed to do this, and plans to be OOB to medi chair this afternoon around 3-4 to go downstairs for a hair cut and would like to be able to tolerate the time up for these tasks    Activities of Daily Living:      Patient left HOB elevated with all lines intact and wife present    AMPA 6 Click:  AMPA Total Score: 6    Treatment & Education:  Pt performed BUE A/AAROM exercises x 15 reps for hand grasp/release, bicep/triceps with light manual resistance, hand to mouth functional ROM. Educated on POC, exercises, con't with OOB as tolerated, wt shift needed when sitting.  Education:    Assessment:     Lv Crocker is a 42 y.o. male with a medical diagnosis of Acute renal failure with acute tubular necrosis superimposed on stage 3 chronic kidney disease.  He presents with motivation to improve strength, agreeable to exercises.  Performance deficits affecting function are weakness, impaired endurance, impaired  self care skills, impaired sensation, impaired functional mobilty, gait instability, impaired balance, decreased coordination, decreased lower extremity function, decreased upper extremity function, pain, impaired coordination, impaired fine motor, impaired cardiopulmonary response to activity, impaired joint extensibility.      Rehab Prognosis:  good; patient would benefit from acute skilled OT services to address these deficits and reach maximum level of function.       Plan:     Patient to be seen 5 x/week to address the above listed problems via self-care/home management, therapeutic activities, therapeutic exercises  · Plan of Care Expires: 05/06/18  · Plan of Care Reviewed with: patient, spouse    This Plan of care has been discussed with the patient who was involved in its development and understands and is in agreement with the identified goals and treatment plan    GOALS:    Occupational Therapy Goals        Problem: Occupational Therapy Goal    Goal Priority Disciplines Outcome Interventions   Occupational Therapy Goal     OT, PT/OT Ongoing (interventions implemented as appropriate)    Description:  Goals to be met by: 5/8/18    Pt will follow 75% of motor commands with <2 verbal cues for repetition of task to maximize engagement in grooming task.--MET  Pt will complete feeding with mod A.   Pt will complete supine with HOB slightly elevated to seated at EOB with mod A in prep for seated grooming task.  Pt will engage in BUE exercises in orders to increase strength to 3+/5 in BUE's to increase engagement in seated grooming task  Pt will sit at EOB for approx 10 minutes with mod A and unilateral UE support to complete grooming task  Pt will complete sit>stand from EOB with bed in lowest position with mod A in prep for transfer to [facility]                       Time Tracking:     OT Date of Treatment: 04/26/18  OT Start Time: 1131  OT Stop Time: 1207  OT Total Time (min): 36 min    Billable Minutes:Therapeutic  Exercise 20 min    Delia To, OT  4/26/2018

## 2018-04-26 NOTE — PROCEDURES
"Modified Barium Swallow    Patient Name:  Lv Crocker   MRN:  [id number]   [id number] A        Recommendations:     Recommendations:                General Recommendations:  Dysphagia therapy, Speech/language therapy, Cognitive-linguistic therapy and One Way Speaking Valve  Diet recommendations:  Regular, Thin (with a chin tuck in isolation of food)   Aspiration Precautions:   · CHIN TUCK to chest for all swallows  · Liquids in isolation of food  · 1 bite/sip at a time and slow rate (to allow increased time to complete spontaneous multiple swallows),   · Feed only when awake/alert,   · Frequent oral care,   · HOB to 90 degrees,   · Meds whole buried in puree,   · Monitor for s/s of aspiration,   · Remain upright 30 minutes post meal,   · Small bites/sips and   · Strict aspiration precautions   · Continue to monitor for signs and symptoms of aspiration and discontinue oral feeding should you notice any of the following: watery eyes, reddened facial area, wet vocal quality, increased work of breathing, change in respiratory status, increased congestion, coughing, fever, etc.  General Precautions: Standard, fall, aspiration  Communication strategies:  One way speaking valve    Referral     Reason for Referral  Patient was referred for a Modified Barium Swallow Study to assess the efficiency of his/her swallow function, rule out aspiration and make recommendations regarding safe dietary consistencies, effective compensatory strategies, and safe eating environment.     Diagnosis: Acute renal failure with acute tubular necrosis superimposed on stage 3 chronic kidney disease       History:     Past Medical History:   Diagnosis Date    Anxiety     Arthritis     Ascites     Thought to be 2/2 heart tpx; liver bx 3/31/17 w/o significant fibrosis    Cardiomyopathy     Depression     Controlled "for the most part" on Lexipro    Encounter for blood transfusion     during transplant    GERD (gastroesophageal reflux " disease)     Hashimoto's disease     History of CHF (congestive heart failure)     Previously EF 20% (prior to heart transplant); last EF 60%, PAP 41 as of 12/12/17; throught to be 2/2 genetics & DM1    History of coronary artery disease     H/o Coronary artery disease; resolved since heart transplant 2014    History of myocardial infarction     h/o MI x3 prior to heart transplant    HLD (hyperlipidemia) 6/11/2015    Hx of psychiatric care     Hypoglycemia unawareness in type 1 diabetes mellitus     Hypothyroid     Initially hyperthyroid 2/2 Hoshimoto's thyroiditis; now on levothyroxine 150 mcg qd    Pleural effusion due to another disorder     Thought to be 2/2 heart tpx; s/p PleuRx catheter placement and removal after 1-2 months    Psychiatric problem     Pulmonary hypertension assoc with unclear multi-factorial mechanisms 12/12/2017    PAP 41 (EF 60%) on ECHO 12/12/17    Syncope 6/30/2015    Type I diabetes mellitus, well controlled     Well controlled; Dx'd @7y/o; on N insulin 20U BID & Humalog 10U w/meals +SSI; Glu 89 11/9/17; A1c 7.2% 4/5/17    Unspecified essential hypertension 05/29/2014    Well controlled; Last /57 on 11/9/17       Objective:     Current Respiratory Status: 04/26/18    Alert: yes    Cooperative: yes    Follows Directions: yes    Visualization  · Patient was seen in the lateral view  · Tracheostomy tube visualized in place/ One Way Speaking Valve present    Oral Peripheral Examination  Oral Musculature: WFL  Dentition: present and adequate  Secretion Management: adequate  Mucosal Quality: dry  Mandibular Strength and Mobility: WFL  Oral Labial Strength and Mobility: WFL  Lingual Strength and Mobility: WFL  Volitional Cough: elicited while donning PMSV  Volitional Swallow: elicited, mildly delayed initiation     Consistencies Assessed  · Thin 5cc via tsp; cup sip x1, straw sips x5  · Nectar thick straw x1  · Puree tsp x3  · Solids cracker x1   · Barium tablet with thin  liquid wash followed by tsp bite of pudding to aid in clearance    Oral Preparation/Oral Phase  · WFL- Pt with adequate bolus acceptance, containment, control and timely A-P transfer across consistencies     Pharyngeal Phase   Patient presents with mild-moderate pharyngeal dysphagia c/b decreased BOT retraction, pharyngeal wall constriction, UES opening, and epiglottic inversion resulting in penetration during the swallow to the vocal chords with trace aspiration of thin liquids with an immediate cough when utilized as a liquid wash, trace-mild vallecular residue and mild-moderate piriform sinus residue above UES with thin liquids increasing to severe pharyngeal residue as viscosities increase.   · Spontaneous cough cleared some penetrated/aspirated material, however, did not completely clear laryngeal vestibule.   · Residue is adequately cleared with spontaneous multiple swallows. Patient with excellent sensation of pharyngeal residue.  · Chin tuck was effective in preventing aspiration/penetration and reducing pharyngeal residue.  · No aspiration or penetration observed with thin liquids with a chin tuck, thin liquids via small sips in isolation of food without a chin tuck, ectar thick liquids without a chin tuck, puree trials, and solid trials.  · Barium tablet assessed with thin liquid wash. Tablet remained in patient's vallecula despite 3 spontaneous swallows in attempt to clear. Tablet cleared with tsp bite of pudding utilized to clear tablet. Patient with excellent pharyngeal sensation of tablet in throat.  · Coughing noted at end of MBSS with no noted aspiration, penetration, or significant residue noted. Patient reports his throat has been feeling dry and sore the past few days. He reports it does not feel like coughing/choking on food/liquids. Coughing may be 2/2 previously noted aspiration event.      Cervical Esophageal Phase  · Decreased UES opening resulting in moderate residue at the UES. Chin tuck  effective in reducing residue at the UES.     Assessment:     Impressions  ·  Patient presents with mild-moderate pharyngeal dysphagia c/b decreased BOT retraction, pharyngeal wall constriction, UES opening, and epiglottic inversion resulting in penetration during the swallow to the vocal chords with trace aspiration of thin liquids with an immediate cough when utilized as a liquid wash, trace-mild vallecular residue and mild-moderate piriform sinus residue above UES with thin liquids increasing to severe pharyngeal residue as viscosities increase.      Prognosis: Good    Barriers:  · Trach    Plan  ST will f/u for further education, to ensure compliance with safe swallowing precautions, to assess tolerance of diet, and initiate dysphagia therapy exercises.     Education  Results were discussed with Medical Team who was in agreement with plan.    SLP provided extensive patient and family (spouse) education on MBSS results, SLP recommendations, safe swallowing strategies, safe swallowing precautions, s/s and risks of aspiration, and POC. SLP utilized DIDIER machine to show patient anatomy and MBSS results. Handout provided listing safe swallowing precautions. Importance of chin tuck and liquids in isolation of foods discussed. All questions addressed and patient/spuse verbalized understanding. White board updated.     Goals:    SLP Goals        Problem: SLP Goal    Goal Priority Disciplines Outcome   SLP Goal     SLP Ongoing (interventions implemented as appropriate)   Description:  Updated Speech Language Pathology Goals  Goals expected to be met by 5/3  1. Pt will tolerate a regular diet and thin liquids with no overt s/s of aspiration.   2. Pt will demonstrate/verbalize 3 safe swallowing strategies with min cues.   3. Pt will name up to 12 items/minute in a concrete category, 90% of attempts, Supervision, to improve cognitive organization  4. Pt will recall 3 related items post 3 minute filled delay, 90% of  attempts, Supervision, to improve STM   5. Pt will answer m/u YNQ with 90% accuracy, Supervision, to improve higher-level comprehension   6. Pt will complete further assessment of reading/writing skills  7. Educate Pt on aspiration precautions and S/S aspiration  8. Pt and family will verbalize/demosntrate understanding of PMSV precautions and safety awareness with no cues.  9. Patient will complete dysphagia therapy exercises x10 each with min cues.     Speech Language Pathology Goals  Goals expected to be met by 4/27/18  1.  Pt will tolerate PMSV for 30 minutes w/ no change in respiratory status and fair voicing produced. -met  2.  Pt will participate in ongoing swallow assessment. -met  3. Pt will name up to 12 items/minute in a concrete category, 90% of attempts, Supervision, to improve cognitive organization. -ongoing  4. Pt will recall 3 related items post 3 minute filled delay, 90% of attempts, Supervision, to improve STM. -ongoing  5. Pt will answer m/u YNQ with 90% accuracy, Supervision, to improve higher-level comprehension. -ongoing  6. Pt will complete further assessment of reading/writing skills. -ongoing  7. Educate Pt on aspiration precautions and S/S aspiration. -ongoing  8. Educate Pt and family on PMSV precautions and safety awareness. -met; reinforcment to continue  Goals expected to be met by 4/20/18  1.  Pt will tolerate PMSV for 30 minutes w/ no change in respiratory status and fair voicing produced.  2.  Pt will complete further evaluation of cognitive-linguistic communication skills to determine the need for additional goals. Met 4/19  3. Pt will name up to 12 items/minute in a concrete category, 90% of attempts, Supervision, to improve cognitive organization  4. Pt will recall 3 related items post 3 minute filled delay, 90% of attempts, Supervision, to improve STM   5. Pt will answer m/u YNQ with 90% accuracy, Supervision, to improve higher-level comprehension   6. Pt will complete further  assessment of reading/writing skills  7. Pending MD clearance, Pt will complete further assessment of swallow fx   8. Educate Pt and family on PMSV precautions and safety awareness    Goals expected to be met by 4/13/18   1.  Pt will tolerate PMSV for 3 min w/ no change in respiratory status and fair voicing produced. Met   2.  Pt will complete further evaluation of cognitive-linguistic communication skills to determine the need for additional goals.                              Plan:   · Patient to be seen:  Therapy Frequency: 5 x/week   · Plan of Care expires:  05/05/18  · Plan of Care reviewed with:  patient        Discharge recommendations:  rehabilitation facility       Time Tracking:   SLP Treatment Date:   04/26/18  Speech Start Time:   (8199-0906)  Speech Stop Time:   (2490-0294)     Speech Total Time (min):    34 minutes    YAMILET Ashley, CCC-SLP   Pager: [phone]  04/26/2018

## 2018-04-26 NOTE — ASSESSMENT & PLAN NOTE
"- Resolved.   - S/P Bronch and intubation 3/14 now post tracheostomy due to inability to extubate  - Pulmonology signed off. Completely weaned off vent.   - RSV positive early and completed course of Ribavarin/IVIG.  - ID had been following. Completed 7 day course of Meropenem/Linezolid earlier in hospitalization  - Appreciate PT/OT/Wound care.  - 4/25 c/o cough with mild clear sputum and "not feeling right" Afebrile with no leukocytosis. CXR reviewed with possible new LLL infiltrate when compared to prior on 4/9. Will repeat Respiratory Viral Panel, and send tracheal sputum cultures. Hold off on abx for now.   "

## 2018-04-26 NOTE — PROGRESS NOTES
[facility]  Physical Medicine & Rehab  Progress Note    Patient Name: Lv Crocker  MRN: [id number]  Admission Date: 3/11/2018  Length of Stay: 45 days  Attending Physician: Behzad Lara Jr.,*    Subjective:     Principal Problem:Acute renal failure with acute tubular necrosis superimposed on stage 3 chronic kidney disease    Hospital Course:   3/24/18:  Evaluated by PT and OT.  Bed mobility and transfers deferred 2/2 intubation and CRRT.   3/26/18:  Participated with PT.  Bed mobility totalA-dependent.  EOB totalA.  3/28/18:  Participated with PT and OT.  Bed mobility totalA-dependent.  ADLs totalA.   4/16/18:  Participated with PT and OT.  Bed mobility max-totalA/dependent.  Sit to stand totalA and transfers totalA.  EOB mod-totalA.  UBD totalA and LBD totalA.  4/17/18:  Participated with PT and SLP.  Bed mobility total-dependent.  EOB x 15 minutes mod-maxA.   4/18/18:  Participated with OT.  Bed mobility totalA.  EOB x 15 minutes totalA.    4/19/18:  Participated with PT, OT, and SLP.  Found to have cognitive-linguistic impairment and dysphonia.  Remains NPO.  Bed mobility total/dependent.  EOB x 24 minutes (static sit x ~10 seconds + ~5 seconds SV) mod-maxA.  No sit to stand, transfers, or gait.  ADLs totalA.   4/22/18:  Participated with PT and OT.  Bed mobility totalA.  EOB maxA.  Sit to stand max-totalA and transfers max-totalA.  LBD maxA.  4/23/18:  No PT or OT 2/2 HD.   4/24/18:  Participated with PT.  Bed mobility maxA.  Sit to stand and transfers maxA.  EOB maxA.   4/25/18:  Participated with OT.  Bed mobility totalA.  Bed to medi chair transfer dependent (totalA x 2).  EOB x 20 minutes maxA.  ADLs totalA.    4/26/18:  Passed MBSS.  SLP recommendation: regular diet and thin liquids (with a chin tuck in isolation of food).    Interval History 4/26/2018:  Patient is seen for follow-up rehab evaluation and recommendations: No acute events over night.  Passed MBSS today for  regular diet and thin liquids.  Participating with therapy.  Barriers for discharge/rehab admission: HD chair    HPI, Past Medical, Family, and Social History remains the same as documented in the initial encounter.    Scheduled Medications:    sodium chloride 0.9%   Intravenous Once    acetaminophen  999.0148 mg Per G Tube TID    cetirizine  5 mg Per G Tube Daily    chlorhexidine  15 mL Mouth/Throat BID    epoetin jose miguel (PROCRIT) injection  4,000 Units Intravenous Every Mon, Wed, Fri    escitalopram oxalate  20 mg Per G Tube Daily    famotidine  20 mg Per G Tube QHS    guaiFENesin  600 mg Oral BID    heparin (porcine)  5,000 Units Subcutaneous Q12H    levothyroxine  75 mcg Intravenous Daily    midodrine  10 mg Oral TID    polyethylene glycol  17 g Oral BID    predniSONE  5 mg Per G Tube Daily    QUEtiapine  50 mg Oral QHS    tacrolimus  1 mg Sublingual BID     PRN Medications: sodium chloride 0.9%, sodium chloride 0.9%, ALPRAZolam, dextromethorphan-guaifenesin  mg/5 ml, heparin (porcine), insulin aspart U-100, midodrine, ondansetron, oxyCODONE, povidone-iodine, ramelteon    Review of Systems   Constitutional: Positive for activity change. Negative for chills, fatigue and fever.   HENT: Negative for drooling, hearing loss, trouble swallowing and voice change.    Eyes: Negative for pain and visual disturbance.   Respiratory: Negative for cough and wheezing.         + trach   Cardiovascular: Negative for chest pain and palpitations.   Gastrointestinal: Negative for abdominal pain, nausea and vomiting.        + PEG   Genitourinary: Negative for difficulty urinating and flank pain.   Musculoskeletal: Positive for arthralgias, gait problem and myalgias. Negative for back pain and neck pain.   Skin: Positive for color change and wound. Negative for rash.   Allergic/Immunologic: Positive for immunocompromised state.   Neurological: Positive for weakness. Negative for dizziness, numbness and headaches.    Psychiatric/Behavioral: Positive for sleep disturbance (improving). Negative for agitation and hallucinations. The patient is not nervous/anxious.      Objective:     Vital Signs (Most Recent):  Temp: 98.6 °F (37 °C) (18 1233)  Pulse: 108 (18 1233)  Resp: 18 (18 1233)  BP: (!) 104/56 (18 1233)  SpO2: 96 % (18 1308)    Vital Signs (24h Range):  Temp:  [98.5 °F (36.9 °C)-98.8 °F (37.1 °C)] 98.6 °F (37 °C)  Pulse:  [104-111] 108  Resp:  [16-18] 18  SpO2:  [91 %-100 %] 96 %  BP: ()/(52-62) 104/56     Physical Exam   Constitutional: He is oriented to person, place, and time. He appears well-developed. No distress.   HENT:   Head: Normocephalic and atraumatic.   Right Ear: External ear normal.   Left Ear: External ear normal.   Nose: Nose normal.   Eyes: Right eye exhibits no discharge. Left eye exhibits no discharge. No scleral icterus.   Neck: Neck supple.   Trach intact.    Cardiovascular: Normal rate, regular rhythm and intact distal pulses.    Pulmonary/Chest: Effort normal. No respiratory distress. He has no wheezes.   Abdominal: Soft. He exhibits no distension. There is no tenderness.   PEG intact, incision LETICIA, CDI   Musculoskeletal: He exhibits no edema.        Right shoulder: He exhibits decreased range of motion and tenderness.   R foot: R great toe and R 3rd toe with necrotic tissue  L foot: small amount of necrotic tissue to L great toe  Overall deconditioning   Neurological: He is alert and oriented to person, place, and time.   -  Mental Status:  AAOx3.  Follows commands.  Answers correct age and .  Recent and remote memory intact.  -  Speech and language:  no aphasia or dysarthria.    -  Motor:  RUE: 3-/5, 3/5 .  LUE: 2+/5, 3/5 .  RLE: 3-/5, DF 3-/5, PF 4/5.  LLE: 3-/5, DF 3-/5, PF 4/5.  -  Sensory:  Intact to light touch and pin prick.   Skin: Skin is warm and dry. No rash noted.   Psychiatric: His behavior is normal. Thought content normal. His affect is  "blunt.   Vitals reviewed.    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 Ischemic changes to right second toe and left hallux  -  Wound care following  -  Podiatry following         Severe malnutrition    -  S/p PEG placement on 4/4/18  -  On continuous TF        Acute respiratory failure with hypoxia    -  Intubated on 3/14/18--> s/p trach 3/28/18--> now on RA  -  Completed 7 day course of Meropenem/Linezolid   -  RSV positive early- completed course of Ribavarin/IVIG        Type 1 diabetes mellitus with stage 3 chronic kidney disease    -  Endocrine following  -  On insulin gtt        Heart transplanted    -  S/p OHTX 11/18/2014 with early post-op course complicated by hemorrhagic tamponade s/p  washout, delayed closure, pericardial window with subsequent a-fib with RVR and CACHORRO and late course complicated by ABMR (in 4/2015 s/p rituxan, PLEX, and IVIG), C.diff/CMV reactivation, CKD, and restrictive cardiomyopathy with subsequent chronic/recurrent pleural effusions and ascites previously requiring monthly paracentesis and thoracentesis until he underwent VATS with pleurX catheter placement on 1/11/2018 with removal on 2/7/2018        Debility    -  2/2 prolonged and acute hospital course  -  (I) ADLs and mobility prior to admission, limited by fatigue/endurance  Recommendations  -  Encourage mobility, OOB in chair, and early ambulation as appropriate  -  PT/OT evaluate and treat  -  Pain management  -  Monitor for and prevent skin breakdown and pressure " ulcers  · Early mobility, repositioning/weight shifting every 20-30 minutes when sitting, turn patient every 2 hours, proper mattress/overlay and chair cushioning, pressure relief/heel protector boots  -  DVT prophylaxis:  MARK, SCD        Anxiety    -  Anxiety and sleeping difficulty   -  Psych consulted--> increased Lexapro to 20 mg, started Seroquel--> stopped xanax and nortriptyline 2/2 hallucinations        Patient approved for [facility].   Transfer to rehab when medically ready for discharge and outpatient HD chair arranged.      Pinky Garcia, RENETTA  Department of Physical Medicine & Rehab   [facility]

## 2018-04-26 NOTE — ASSESSMENT & PLAN NOTE
- Continue PTA  Escitalopram  - Increased Lexapro to 20mg daily per psych recs  - Started Seroquel as per psych rec.  - Add prn xanex for PRN anxiety (low threshold to stop if patient has recurrent hallucinations), may need long acting anti anxiolytic long term

## 2018-04-26 NOTE — NURSING
Rounds Report: Attended interdisciplinary rounds this morning with the transplant team including SW, physicians, fellows,  mid-level providers, and transplant coordinators.  Discussed plan of care, including new productive cough. Cultures and RSV panel sent off. Not starting antibiotics for now. Pt with no fever or WBC. Passed barium swallow yesterday, diet advanced.   [facility] has accepted him for dialysis while at rehab. Finalizing rehab. Possible discharge next week.

## 2018-04-26 NOTE — PT/OT/SLP PROGRESS
Occupational Therapy      Patient Name:  Lv Crocker   MRN:  [id number]  4/25/18   Patient not seen today secondary to Dialysis. Will follow-up as schedule allows.    Delia To OT  4/26/2018

## 2018-04-26 NOTE — NURSING
Notified endocrine of pt's diet change and tube feed rate change.  Lunchtime BG post prandial, verbal instruction received to still cover w/ correction dose.  Pt ate 5-6 bits of mashed potatoes, no swallowing difficulties noted.  TF rate decreased to 30 cc/hr, insulin remains at 1.2 u/hr per endocrine.

## 2018-04-26 NOTE — PLAN OF CARE
Problem: Patient Care Overview  Goal: Plan of Care Review  Outcome: Ongoing (interventions implemented as appropriate)  Pt is AAOx4 in bed wearing non-skid footwear, bed in low/locked position and with call bell within reach. Pt reminded to use call bell to call for assistance, pt verbalizes understanding. Wife at bedside. Pt is afebrile at this time. Proper hand hygiene performed before and after pt care activities. Mid-line abdominal incision LETICIA with staples, free from SSI. Trach with #6 shiley in place. Patient with productive cough, has been suctioned once so far this shift. BG monitored Q4 hrs, has been 248, 237, and 164 this shift. Sliding scale coverage administered as needed. Insulin gtt infusing at 1.2 units/hr. Tube feedings infusing at 40cc/hr, no residuals this shift. Patient c/o anxiety earlier in shift, one time dose of xanax administered. Patient reported moderate relief.

## 2018-04-26 NOTE — PLAN OF CARE
Problem: SLP Goal  Goal: SLP Goal  Updated Speech Language Pathology Goals  Goals expected to be met by 5/3  1. Pt will tolerate a regular diet and thin liquids with no overt s/s of aspiration.   2. Pt will demonstrate/verbalize 3 safe swallowing strategies with min cues.   3. Pt will name up to 12 items/minute in a concrete category, 90% of attempts, Supervision, to improve cognitive organization  4. Pt will recall 3 related items post 3 minute filled delay, 90% of attempts, Supervision, to improve STM   5. Pt will answer m/u YNQ with 90% accuracy, Supervision, to improve higher-level comprehension   6. Pt will complete further assessment of reading/writing skills  7. Educate Pt on aspiration precautions and S/S aspiration  8. Pt and family will verbalize/demosntrate understanding of PMSV precautions and safety awareness with no cues.  9. Patient will complete dysphagia therapy exercises x10 each with min cues.     Speech Language Pathology Goals  Goals expected to be met by 4/27/18  1.  Pt will tolerate PMSV for 30 minutes w/ no change in respiratory status and fair voicing produced. -met  2.  Pt will participate in ongoing swallow assessment. -met  3. Pt will name up to 12 items/minute in a concrete category, 90% of attempts, Supervision, to improve cognitive organization. -ongoing  4. Pt will recall 3 related items post 3 minute filled delay, 90% of attempts, Supervision, to improve STM. -ongoing  5. Pt will answer m/u YNQ with 90% accuracy, Supervision, to improve higher-level comprehension. -ongoing  6. Pt will complete further assessment of reading/writing skills. -ongoing  7. Educate Pt on aspiration precautions and S/S aspiration. -ongoing  8. Educate Pt and family on PMSV precautions and safety awareness. -met; reinforcment to continue  Goals expected to be met by 4/20/18  1.  Pt will tolerate PMSV for 30 minutes w/ no change in respiratory status and fair voicing produced.  2.  Pt will complete further  evaluation of cognitive-linguistic communication skills to determine the need for additional goals. Met 4/19  3. Pt will name up to 12 items/minute in a concrete category, 90% of attempts, Supervision, to improve cognitive organization  4. Pt will recall 3 related items post 3 minute filled delay, 90% of attempts, Supervision, to improve STM   5. Pt will answer m/u YNQ with 90% accuracy, Supervision, to improve higher-level comprehension   6. Pt will complete further assessment of reading/writing skills  7. Pending MD clearance, Pt will complete further assessment of swallow fx   8. Educate Pt and family on PMSV precautions and safety awareness    Goals expected to be met by 4/13/18   1.  Pt will tolerate PMSV for 3 min w/ no change in respiratory status and fair voicing produced. Met   2.  Pt will complete further evaluation of cognitive-linguistic communication skills to determine the need for additional goals.            Outcome: Ongoing (interventions implemented as appropriate)  MBSS completed. Patient presented with aspiration of thin liquids x1 with an immediate cough without swallowing strategy and when utilized as liquid wash. Patient presented with no further aspiration or penetration with thin liquids with a chin tuck in isolation of food, nectar thick liquids, puree, or solids. ST will continue to follow to ensure compliance with safe swallowing precautions, assess tolerance of diet, and implement dysphagia therapy exercises.   JOSELYN Michele., CCC-SLP  Pager: [phone]  04/26/2018

## 2018-04-26 NOTE — SUBJECTIVE & OBJECTIVE
Interval History 4/26/2018:  Patient is seen for follow-up rehab evaluation and recommendations: No acute events over night.  Passed MBSS today for regular diet and thin liquids.  Participating with therapy.  Barriers for discharge/rehab admission: HD chair    HPI, Past Medical, Family, and Social History remains the same as documented in the initial encounter.    Scheduled Medications:    sodium chloride 0.9%   Intravenous Once    acetaminophen  999.0148 mg Per G Tube TID    cetirizine  5 mg Per G Tube Daily    chlorhexidine  15 mL Mouth/Throat BID    epoetin jose miguel (PROCRIT) injection  4,000 Units Intravenous Every Mon, Wed, Fri    escitalopram oxalate  20 mg Per G Tube Daily    famotidine  20 mg Per G Tube QHS    guaiFENesin  600 mg Oral BID    heparin (porcine)  5,000 Units Subcutaneous Q12H    levothyroxine  75 mcg Intravenous Daily    midodrine  10 mg Oral TID    polyethylene glycol  17 g Oral BID    predniSONE  5 mg Per G Tube Daily    QUEtiapine  50 mg Oral QHS    tacrolimus  1 mg Sublingual BID     PRN Medications: sodium chloride 0.9%, sodium chloride 0.9%, ALPRAZolam, dextromethorphan-guaifenesin  mg/5 ml, heparin (porcine), insulin aspart U-100, midodrine, ondansetron, oxyCODONE, povidone-iodine, ramelteon    Review of Systems   Constitutional: Positive for activity change. Negative for chills, fatigue and fever.   HENT: Negative for drooling, hearing loss, trouble swallowing and voice change.    Eyes: Negative for pain and visual disturbance.   Respiratory: Negative for cough and wheezing.         + trach   Cardiovascular: Negative for chest pain and palpitations.   Gastrointestinal: Negative for abdominal pain, nausea and vomiting.        + PEG   Genitourinary: Negative for difficulty urinating and flank pain.   Musculoskeletal: Positive for arthralgias, gait problem and myalgias. Negative for back pain and neck pain.   Skin: Positive for color change and wound. Negative for rash.    Allergic/Immunologic: Positive for immunocompromised state.   Neurological: Positive for weakness. Negative for dizziness, numbness and headaches.   Psychiatric/Behavioral: Positive for sleep disturbance (improving). Negative for agitation and hallucinations. The patient is not nervous/anxious.      Objective:     Vital Signs (Most Recent):  Temp: 98.6 °F (37 °C) (18 1233)  Pulse: 108 (18 1233)  Resp: 18 (18 1233)  BP: (!) 104/56 (18 1233)  SpO2: 96 % (18 1308)    Vital Signs (24h Range):  Temp:  [98.5 °F (36.9 °C)-98.8 °F (37.1 °C)] 98.6 °F (37 °C)  Pulse:  [104-111] 108  Resp:  [16-18] 18  SpO2:  [91 %-100 %] 96 %  BP: ()/(52-62) 104/56     Physical Exam   Constitutional: He is oriented to person, place, and time. He appears well-developed. No distress.   HENT:   Head: Normocephalic and atraumatic.   Right Ear: External ear normal.   Left Ear: External ear normal.   Nose: Nose normal.   Eyes: Right eye exhibits no discharge. Left eye exhibits no discharge. No scleral icterus.   Neck: Neck supple.   Trach intact.    Cardiovascular: Normal rate, regular rhythm and intact distal pulses.    Pulmonary/Chest: Effort normal. No respiratory distress. He has no wheezes.   Abdominal: Soft. He exhibits no distension. There is no tenderness.   PEG intact, incision LETICIA, CDI   Musculoskeletal: He exhibits no edema.        Right shoulder: He exhibits decreased range of motion and tenderness.   R foot: R great toe and R 3rd toe with necrotic tissue  L foot: small amount of necrotic tissue to L great toe  Overall deconditioning   Neurological: He is alert and oriented to person, place, and time.   -  Mental Status:  AAOx3.  Follows commands.  Answers correct age and .  Recent and remote memory intact.  -  Speech and language:  no aphasia or dysarthria.    -  Motor:  RUE: 3-/5, 3/5 .  LUE: 2+/5, 3/5 .  RLE: 3-/5, DF 3-/5, PF 4/5.  LLE: 3-/5, DF 3-/5, PF 4/5.  -  Sensory:  Intact to  light touch and pin prick.   Skin: Skin is warm and dry. No rash noted.   Psychiatric: His behavior is normal. Thought content normal. His affect is blunt.   Vitals reviewed.    Diagnostic Results:   Labs: Reviewed  X-Ray: Reviewed  US: Reviewed  CT: Reviewed    NEUROLOGICAL EXAMINATION:     MENTAL STATUS   Oriented to person, place, and time.

## 2018-04-26 NOTE — ASSESSMENT & PLAN NOTE
BG goal 140-180,       Continue transition from 1.2u/hr, low correction q4hr checks     I have asked primary team to notify me of TF regimen in preparation for insulin recs for discharge in near future     Pt is T1DM, do not suspend insulin >1 hr   If TF held, decrease insulin rate to 0.4u/hr   (known to have controlled BG on basal needs of lev 5 daily/ 0.2u/hr during this hospitalization)        Discharge Recommendations:  TBD.

## 2018-04-26 NOTE — PROGRESS NOTES
Spoke with JOSE DANIEL Enamorado - pt passed MBSS and diet to be advanced.     -Recommend low sodium diet, texture per SLP.   -Reduce TF to Novasource Renal at 30mL/hr at this time x 24hours.   -Please keep good record of PO Intake - once pt eating 50% of all meals x 3 days, wean TF.  -If patient is drinking 300mL+  PO liquids, can d/c FWF order. If pt has poor PO liquid intake, continue flushing tube with 100mL H20 TID. If PO liquid intake is fair, flushes per RN.   -Send Arginaid BID with meal trays- mix with 6-8 oz H20/juice for wound healing.   -RD to follow up Monday to reassess intake and provide recommendations.     Thank you.   YESSENIA Cabrera #[id number]

## 2018-04-26 NOTE — PROGRESS NOTES
Update:    SW to pt's room for update. Pt and wife aaox4, calm, and quiet. Pt and wife report coping adequately at this time. Pt's wife reports pt passed swallow study today, and is happy to be able to eat. Pt and wife report no questions or concerns for SW at this time. SW providing ongoing psychosocial and counseling support, education, assistance, resources, and discharge planning as indicated. SW continuing to follow and remains available.

## 2018-04-26 NOTE — PLAN OF CARE
Problem: Patient Care Overview  Goal: Plan of Care Review  Outcome: Ongoing (interventions implemented as appropriate)  POC reviewed w/ pt this am to include promoting healthy respiratory function, tube feed, blood glucose management, frequent turning, pain control, and barium swallow study.  Pt had barium swallow done this am, performed well - progressed to regular diet w/ eating recs.  Pt's accuchecks switched to AC/HS, insulin gtt remains constant at 1.2 u/hr.  Tube feeding decreased to 30 cc/hr continuous - 100 cc water bolus TID.  Nasal swab and respiratory culture sent today.  Pt worked w/ PT and OT today, also got to cardiac chair w/ RN this afternoon.  Pain moderately controlled w/ frequent position changes.  Pt c/o anxiety - prn xanax written, pt also wheeled out of room for re-orientation.

## 2018-04-26 NOTE — ASSESSMENT & PLAN NOTE
- abdominal pain improved from weekend  - BM x4 on 4/25, passing gas  - KUB without evidence of bowel obstructionunclear if  worsening gastroparesis or new partial obstruction  - Per GI recs patient should tolerate up to 400 mL residuals. Will wean narcotics. Can complete CT AP if needed however pain is mild and improving at this point  - CMV PCR undetected

## 2018-04-26 NOTE — PT/OT/SLP PROGRESS
"Physical Therapy Treatment    Patient Name:  Lv Crocker   MRN:  [id number]    Recommendations:     Discharge Recommendations:  rehabilitation facility   Discharge Equipment Recommendations:     Barriers to discharge: Inaccessible home and Decreased caregiver support    Assessment:     Lv Crocker is a 42 y.o. male admitted with a medical diagnosis of Acute renal failure with acute tubular necrosis superimposed on stage 3 chronic kidney disease.  He presents with the following impairments/functional limitations:  weakness, impaired functional mobilty, gait instability, impaired endurance, impaired balance, impaired self care skills, decreased lower extremity function, decreased upper extremity function, decreased ROM, impaired coordination, impaired cardiopulmonary response to activity, impaired joint extensibility.  Pt tolerated treatment session c no c/o difficulty.  Pt unwilling to participate in sitting EOB or transferring to cardiac chair stating "I finally get to eat something and I want to eat lunch in bed."  Pt also states he will get transferred into cardiac chair later in day to get hair cut and is still limited by sacral wounds.  Pt continues to demo BLE weakness especially c hip flexion and demo limited B DF ROM.  Pt would benefit from continued skilled acute PT 5x/wk to improve functional mobility.       Rehab Prognosis:  fair; patient would benefit from acute skilled PT services to address these deficits and reach maximum level of function.      Recent Surgery: Procedure(s) (LRB):  INSERTION-CATHETER-PERM-A-CATH (Left)  INSERTION-TUBE-GASTROSTOMY (N/A) 22 Days Post-Op    Plan:     During this hospitalization, patient to be seen 5 x/week to address the above listed problems via gait training, therapeutic activities, therapeutic exercises, neuromuscular re-education  · Plan of Care Expires:  05/06/18   Plan of Care Reviewed with: patient, spouse    Subjective     Communicated with RN " "and OT prior to session.  Patient found supine upon PT entry to room, agreeable to treatment.      Chief Complaint: fatigue; weakness  Patient comments/goals: "I'm going to get in the medichair later today to go outside and get my haircut downstairs." - when asked if pt wanted to transfer over to medichair   Pain/Comfort:  · Pain Rating 1: 0/10    Patients cultural, spiritual, Anabaptist conflicts given the current situation: none    Objective:     Patient found with: PEG Tube, tracheostomy, peripheral IV     General Precautions: Standard, fall   Orthopedic Precautions:N/A   Braces: N/A     Functional Mobility:  · Bed Mobility:     · Scooting: towards HOB in supine (total A)      AM-PAC 6 CLICK MOBILITY  Turning over in bed (including adjusting bedclothes, sheets and blankets)?: 2  Sitting down on and standing up from a chair with arms (e.g., wheelchair, bedside commode, etc.): 2  Moving from lying on back to sitting on the side of the bed?: 2  Moving to and from a bed to a chair (including a wheelchair)?: 2  Need to walk in hospital room?: 1  Climbing 3-5 steps with a railing?: 1  Total Score: 10       Therapeutic Activities and Exercises:  Educated on PT role/POC; benefits of OOB activity; attempting to increased tolerance for upright position in cardiac chair; performing BLE/BUE exercises throughout day; performing BLE stretches c assistance from family.  Therex: (SKTC, hip add/abd, hip IR/ER, ankle DF/PF) 1x15 ea  B ankle stretch into DF 5x15 secs     Patient left HOB elevated with all lines intact, call button in reach, RN notified and wife present..    GOALS:    Physical Therapy Goals        Problem: Physical Therapy Goal    Goal Priority Disciplines Outcome Goal Variances Interventions   Physical Therapy Goal     PT/OT, PT Ongoing (interventions implemented as appropriate)     Description:  Goals to be met by: 18     Patient will increase functional independence with mobility by performin. Supine " to sit with Moderate Assistance.  2. Sit to supine with Moderate Assistance.  3. Rolling to Left and Right with Minimal Assistance.   4. Sit to stand transfer with Moderate Assistance.  5. Bed to chair transfer with Maximum Assistance.  6. Gait  x 5 feet with Minimal Assistance.   7.  Pt to perf and be compliant with LE HEP to improve vascularity and mm strength.                           Time Tracking:     PT Received On: 04/26/18  PT Start Time: 1131     PT Stop Time: 1207  PT Total Time (min): 36 min     Billable Minutes: Therapeutic Activity 10 min and Therapeutic Exercise 13 mins   Co-treat c OT    Treatment Type: Treatment  PT/PTA: PT     PTA Visit Number: 0     Corey Hernández, PT  04/26/2018

## 2018-04-26 NOTE — SUBJECTIVE & OBJECTIVE
Interval History: Did not sleep well last night, still w/ cough though now productive of thin clear sputum (though cough is better today). Some anxiety overnight, given xanex and put back on 5 L via trach collar due to anxiousness and sat 90%. Feels better this AM but states he keeps getting anxious at night and after dialysis regularly.  Very happy he passed his swallow study this AM.    Continuous Infusions:   insulin (HUMAN R) infusion (adults) 1.2 Units/hr (04/26/18 0550)     Scheduled Meds:   sodium chloride 0.9%   Intravenous Once    acetaminophen  999.0148 mg Per G Tube TID    cetirizine  5 mg Per G Tube Daily    chlorhexidine  15 mL Mouth/Throat BID    epoetin jose miguel (PROCRIT) injection  4,000 Units Intravenous Every Mon, Wed, Fri    escitalopram oxalate  20 mg Per G Tube Daily    famotidine  20 mg Per G Tube QHS    heparin (porcine)  5,000 Units Subcutaneous Q12H    levothyroxine  75 mcg Intravenous Daily    midodrine  10 mg Oral TID    polyethylene glycol  17 g Oral BID    predniSONE  5 mg Per G Tube Daily    QUEtiapine  50 mg Oral QHS    tacrolimus  1 mg Sublingual BID     PRN Meds:sodium chloride 0.9%, sodium chloride 0.9%, ALPRAZolam, dextromethorphan-guaifenesin  mg/5 ml, heparin (porcine), insulin aspart U-100, midodrine, ondansetron, oxyCODONE, povidone-iodine, ramelteon    Review of patient's allergies indicates:   Allergen Reactions    No known drug allergies      Objective:     Vital Signs (Most Recent):  Temp: 98.6 °F (37 °C) (04/26/18 1233)  Pulse: 108 (04/26/18 1233)  Resp: 18 (04/26/18 1233)  BP: (!) 104/56 (04/26/18 1233)  SpO2: (!) 94 % (04/26/18 1233) Vital Signs (24h Range):  Temp:  [98.5 °F (36.9 °C)-98.8 °F (37.1 °C)] 98.6 °F (37 °C)  Pulse:  [104-111] 108  Resp:  [16-18] 18  SpO2:  [91 %-100 %] 94 %  BP: ()/(52-62) 104/56     Patient Vitals for the past 72 hrs (Last 3 readings):   Weight   04/26/18 0545 74.9 kg (165 lb 2 oz)   04/25/18 0500 75.9 kg (167 lb 5.3  oz)     Body mass index is 25.86 kg/m².      Intake/Output Summary (Last 24 hours) at 04/26/18 1237  Last data filed at 04/26/18 0300   Gross per 24 hour   Intake           974.96 ml   Output                0 ml   Net           974.96 ml     Hemodynamic Parameters:    Physical Exam   Constitutional: He is oriented to person, place, and time.   Generalized weakness but normal ROM   Neck: No JVD present. Trache in place and well  secured  Cardiovascular: Normal rate, regular rhythm and normal heart sounds.    Pulmonary/Chest: Effort normal and breath sounds decreased at RLB, no wheezes noted.   Abdominal: Soft. Bowel sounds are normal. Mild diffuse TTP with deep palpation  Musculoskeletal: Normal range of motion. He exhibits no edema or tenderness.   Neurological: He is alert and oriented to person, place, and time.   Skin: Skin is warm and dry.   Psychiatric: - able to interact and ask questions this morning  Nursing note and vitals reviewed.    Significant Labs:  CBC:    Recent Labs  Lab 04/24/18  0532 04/25/18  0505 04/26/18  0430   WBC 3.06* 3.29* 4.51   RBC 2.39* 2.50* 2.41*   HGB 7.5* 7.7* 7.6*   HCT 24.2* 25.5* 24.8*    208 235   * 102* 103*   MCH 31.4* 30.8 31.5*   MCHC 31.0* 30.2* 30.6*     BNP:  No results for input(s): BNP in the last 168 hours.    Invalid input(s): BNPTRIAGELBLO  CMP:    Recent Labs  Lab 04/24/18  0532 04/25/18  0505 04/26/18  0430   * 222* 163*   CALCIUM 9.2 9.6 8.8   ALBUMIN 1.9* 2.0* 2.4*   PROT 5.7* 5.9* 6.0   * 132* 131*   K 3.4* 3.7 3.7   CO2 23 24 21*   CL 96 94* 97   BUN 33* 45* 30*   CREATININE 2.4* 3.3* 2.2*   ALKPHOS 203* 186* 199*   ALT 9* 10 9*   AST 13 16 15   BILITOT 0.3 0.3 0.4      Coagulation:   No results for input(s): PT, INR, APTT in the last 168 hours.  LDH:  No results for input(s): LDH in the last 72 hours.  Microbiology:  Microbiology Results (last 7 days)     Procedure Component Value Units Date/Time    Respiratory Viral Panel by PCR  [facility]; Nasal Swab [[id number]] Collected:  04/26/18 1123    Order Status:  Sent Specimen:  Respiratory Updated:  04/26/18 1138    AFB Culture & Smear [[id number]] Collected:  03/14/18 1137    Order Status:  Completed Specimen:  Bronchial Wash from Amniotic Fluid Updated:  04/26/18 0927     AFB Culture & Smear Culture in progress     AFB Culture & Smear Culture in progress     AFB CULTURE STAIN No acid fast bacilli seen.    Culture, Respiratory with Gram Stain [[id number]]     Order Status:  No result Specimen:  Respiratory from Tracheal Aspirate         I have reviewed all pertinent labs within the past 24 hours.    Estimated Creatinine Clearance: 40.1 mL/min (A) (based on SCr of 2.2 mg/dL (H)).    Diagnostic Results:

## 2018-04-26 NOTE — PROGRESS NOTES
"[facility]  Endocrinology  Progress Note    Admit Date: 3/11/2018     Reason for Consult: abnormal TFTs    Surgical Procedure and Date: s/p heart transplant 2014     Diabetes diagnosis year: 5yo (T1DM)     Home Diabetes Medications:    Levemir 15u qHS  Novolog 4u AC     How often checking glucose at home? 4 times daily   BG readings on regimen: 150-200s  Hypoglycemia on the regimen?  Yes, rarely - treats appropriately (light snack, glucose tab)  Missed doses on regimen?  No        Diabetes Complications include:     Hyperglycemia, Hypoglycemia  and Diabetic chronic kidney disease          Complicating diabetes co morbidities:   N/A     HPI:   Patient is a 42 y.o. male with a diagnosis of T1DM, HTN, hypothyroidism, s/p heart transplant.  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 He required serial paracentesis and thoracentesis until he underwent a VATS with pleurX catheter placement on 1/11/2018 and removed 2/7/18.       Presented to hospital secondary to diarrhea and cough.  Upon initial labs, TSH was decreased (0.101) and free T4 1.33.  Endocrinology consulted to assist in management of these labs.  He was last seen in clinic by Kamala Vázquez NP 11/2017.   Previous hospitalization, endocrine consulted for same problem.  During that visit, recommended decreasing LT4 to 125mcg daily. Unfortunately, patient was discharged on previous dose of 150mcg daily.     Interval HPI:   Overnight events:  Trach in place.   TF infusing 40cc/hr continuously, no residuals this shift   BG at or above goal   On CRRT  On PDN 5mg daily   Remains IV LT4 75mcg daily    BP (!) 101/57 (BP Location: Right arm, Patient Position: Lying)   Pulse 105   Temp 98.8 °F (37.1 °C) (Oral)   Resp 18   Ht 5' 7" (1.702 m)   Wt 74.9 kg (165 lb 2 oz)   SpO2 97% Comment: Simultaneous filing. User may not have seen previous " data.  BMI 25.86 kg/m²       Labs Reviewed and Include      Recent Labs  Lab 04/26/18  0430   *   CALCIUM 8.8   ALBUMIN 2.4*   PROT 6.0   *   K 3.7   CO2 21*   CL 97   BUN 30*   CREATININE 2.2*   ALKPHOS 199*   ALT 9*   AST 15   BILITOT 0.4     Lab Results   Component Value Date    WBC 4.51 04/26/2018    HGB 7.6 (L) 04/26/2018    HCT 24.8 (L) 04/26/2018     (H) 04/26/2018     04/26/2018       Recent Labs  Lab 04/21/18  0334   TSH 7.085*   FREET4 0.87     Lab Results   Component Value Date    HGBA1C 5.1 04/05/2018       Nutritional status:   Body mass index is 25.86 kg/m².  Lab Results   Component Value Date    ALBUMIN 2.4 (L) 04/26/2018    ALBUMIN 2.0 (L) 04/25/2018    ALBUMIN 1.9 (L) 04/24/2018     Lab Results   Component Value Date    PREALBUMIN 13 (L) 04/08/2018    PREALBUMIN 5 (L) 03/15/2018    PREALBUMIN 18 (L) 03/24/2015       Estimated Creatinine Clearance: 40.1 mL/min (A) (based on SCr of 2.2 mg/dL (H)).    Accu-Checks  Recent Labs      04/24/18   1256  04/24/18   1743  04/24/18   2107  04/25/18   0102  04/25/18   0507  04/25/18   0904  04/25/18   1312  04/25/18   1907  04/25/18   2322  04/26/18   0333   POCTGLUCOSE  248*  196*  164*  184*  254*  172*  146*  248*  237*  164*       Current Medications and/or Treatments Impacting Glycemic Control  Immunotherapy:  Immunosuppressants         Stop Route Frequency     tacrolimus capsule 1 mg      -- SL 2 times daily        Steroids:   Hormones     Start     Stop Route Frequency Ordered    04/19/18 0900  predniSONE tablet 5 mg      -- PER G TUBE Daily 04/18/18 1106        Pressors:    Autonomic Drugs     Start     Stop Route Frequency Ordered    04/25/18 0900  midodrine tablet 10 mg      -- Oral 3 times daily 04/25/18 0650    04/13/18 0859  midodrine tablet 10 mg      -- Oral As needed (PRN) 04/13/18 0800        Hyperglycemia/Diabetes Medications: Antihyperglycemics     Start     Stop Route Frequency Ordered    04/11/18 0015  insulin  regular (Humulin R) 100 Units in sodium chloride 0.9% 100 mL infusion     Question:  Insulin Rate Adjustment (DO NOT MODIFY ANSWER)  Answer:  \\[email or web address]    -- IV Continuous 04/10/18 2313    04/10/18 2221  insulin aspart U-100 pen 0-4 Units      -- SubQ Every 4 hours PRN 04/10/18 2122          ASSESSMENT and PLAN    Type 1 diabetes mellitus with stage 3 chronic kidney disease    BG goal 140-180,       Continue transition from 1.2u/hr, low correction q4hr checks     I have asked primary team to notify me of TF regimen in preparation for insulin recs for discharge in near future     Pt is T1DM, do not suspend insulin >1 hr   If TF held, decrease insulin rate to 0.4u/hr   (known to have controlled BG on basal needs of lev 5 daily/ 0.2u/hr during this hospitalization)        Discharge Recommendations:  TBD.          On enteral nutrition     TF at goal May increase insulin needs             Current chronic use of systemic steroids     Can increase insulin requirement          Acute respiratory failure with hypoxia     Per primary  S/p trach.  Practicing with speech valve.  Remains NPO          Hypothyroidism due to Hashimoto's thyroiditis     continue iv levothyroxine 75mcg daily  tsh improving, monitor q1-2 weeks        Palmira Dorsey MD  Endocrinology  [facility] Chauhan MD,  have personally taken the history and examined the patient and agree with the resident's note as stated above.      Depending on # carb intake can start CHO counting 1:20g cho

## 2018-04-26 NOTE — PLAN OF CARE
Problem: Nutrition, Imbalanced: Inadequate Oral Intake (Adult)  Intervention: Promote/Optimize Nutrition  Spoke with JOSE DANIEL Enamorado - pt passed MBSS and diet to be advanced.      -Recommend low sodium diet, texture per SLP.   -Reduce TF to Novasource Renal at 30mL/hr at this time x 24hours.   -Please keep good record of PO Intake - once pt eating 50% of all meals x 3 days, wean TF.  -If patient is drinking 300mL+  PO liquids, can d/c FWF order. If pt has poor PO liquid intake, continue flushing tube with 100mL H20 TID. If PO liquid intake is fair, flushes per RN.   -Send Arginaid BID with meal trays- mix with 6-8 oz H20/juice for wound healing.   -RD to follow up Monday to reassess intake and provide recommendations.      Thank you.   YESSENIA Cabrera #[id number]

## 2018-04-26 NOTE — PROGRESS NOTES
Notified Dr. Cooley on call for [facility]TS of patient's c/o anxiety over past couple hours. Patient received nighttime dose of Seroquel at 2000. Patient is AAOx4, vital signs are stable, and reports a feeling of generalized anxiety. MD to place orders.

## 2018-04-26 NOTE — ASSESSMENT & PLAN NOTE
- Appreciate Endocrine's recs  - Insulin gtt per endocrine recs. Adjust as needed  - per endocrine please call when he is off tube feeds to adjust ggt and transition to rehab/outpatient regimen

## 2018-04-27 NOTE — ASSESSMENT & PLAN NOTE
- S/P Bronch and intubation 3/14 now post tracheostomy due to inability to extubate  - Pulmonology signed off. Completely weaned off vent.   - RSV positive early and completed course of Ribavarin/IVIG.  - ID had been following. Completed 7 day course of Meropenem/Linezolid earlier in hospitalization  - Appreciate PT/OT/Wound care.  - Cough improved a bit overnight. CXR with possible new LLL infiltrate when compared to prior on 4/9. Repeat Respiratory Viral Panel and tracheal sputum cultures pending. Hold off on abx for now. Will order some CPT.

## 2018-04-27 NOTE — PT/OT/SLP PROGRESS
Physical Therapy Treatment    Patient Name:  Lv Crocker   MRN:  [id number]    Recommendations:     Discharge Recommendations:  rehabilitation facility   Discharge Equipment Recommendations:  (TBD)   Barriers to discharge: Decreased caregiver support (at current functional level)    Assessment:     Lv Crocker is a 42 y.o. male admitted with a medical diagnosis of Acute respiratory failure with hypoxia.  He presents with the following impairments/functional limitations:  weakness, impaired functional mobilty, impaired endurance, impaired self care skills, gait instability, decreased lower extremity function, decreased upper extremity function, impaired balance, decreased coordination, pain, impaired skin, impaired cardiopulmonary response to activity, decreased ROM, impaired muscle length, impaired joint extensibility, impaired coordination. Pt continues to require increased assist to complete functional mobility but progressing, as he was able to tolerate standing x2 trials and seated scooting along EOB. Improvements also noted in sitting balance and postural control, as pt was able to maintain static sitting without physical assist for brief bouts this session. Pt continues to experience pain in B feet to touch, limiting ability to bear weight. Pt would continue to benefit from skilled acute PT in order to address current deficits and progress functional mobility.     Rehab Prognosis:  good; patient would benefit from acute skilled PT services to address these deficits and reach maximum level of function.      Recent Surgery: Procedure(s) (LRB):  INSERTION-CATHETER-PERM-A-CATH (Left)  INSERTION-TUBE-GASTROSTOMY (N/A) 23 Days Post-Op    Plan:     During this hospitalization, patient to be seen 5 x/week to address the above listed problems via gait training, therapeutic activities, therapeutic exercises, neuromuscular re-education  · Plan of Care Expires:  05/06/18   Plan of Care Reviewed with:  "patient, family    Subjective     Communicated with RN prior to session.  Patient found supine upon PT entry to room, agreeable to treatment.      Chief Complaint: B foot pain to touch; anxiety   Patient comments/goals: "I just get anxious."  Pain/Comfort:  · Pain Rating 1:  (did not rate)  · Location - Side 1: Bilateral  · Location - Orientation 1: distal  · Location 1: foot (to touch)  · Pain Addressed 1: Reposition, Distraction, Cessation of Activity    Patients cultural, spiritual, Nondenominational conflicts given the current situation: none noted    Objective:     Patient found with: telemetry, tracheostomy, PICC line, PEG Tube     General Precautions: Standard, fall   Orthopedic Precautions:N/A   Braces: N/A     Functional Mobility:  · Bed Mobility:     · Scooting: seated scooting along EOB x2 reps with totalAx2; supine scooting to HOB with drawsheet and totalAx2  · Supine to Sit: total assistance  · Sit to Supine: total assistance  · Transfers:     · Sit to Stand:  total assistance of 2 persons with no AD, x2 reps  · Balance:   · sitting: ranged from close SBA to maxA   · Standing: totalAx2      AM-PAC 6 CLICK MOBILITY  Turning over in bed (including adjusting bedclothes, sheets and blankets)?: 2  Sitting down on and standing up from a chair with arms (e.g., wheelchair, bedside commode, etc.): 2  Moving from lying on back to sitting on the side of the bed?: 2  Moving to and from a bed to a chair (including a wheelchair)?: 2  Need to walk in hospital room?: 1  Climbing 3-5 steps with a railing?: 1  Total Score: 10       Therapeutic Activities and Exercises:  -Pt found with PMSV in place; remained in place throughout session.   -Sitting at EOB with assist ranging from close SBA to maxA for sitting balance and postural control. Pt demo'ing posterior lean, posterior pelvic tilt, forward head, cervical flexion, rounded shoulders; cues provided for correction. BUE positioned BUE in WB to assist with sitting balance and " joint approximation. BLE also positioned in WB to assist with balance and joint approximation; pain in feet with WB, requiring positional adjustments for relief. PT provided postural cues and assist throughout for increased thoracic and cervical extension, anterior pelvic tilt, and scapular retraction. Pt able to maintain sitting balance and cervical extension for brief bouts without assist, but fatigued quickly. Pt with increased anxiety while sitting EOB, requiring rest break with therapist providing maxA for trunk support; cues provided for breathing technique for anxiety reduction.   -Performed UE therex with OT while PT provided trunk support, postural cues, and assist for appropriate weight-shifts  -Performed sit<>stand and static standing with totalAx2 x2 trials; max v/c and t/c throughout for upright posture, anterior weight-shifting, forward gaze, and increased hip, knee, trunk, and cervical extension  -Performed seated scooting along EOB with totalAx2 x2 reps with max cues and instruction prior for sequencing and technique     Patient left supine with all lines intact, call button in reach and 2 family members present..    GOALS:    Physical Therapy Goals        Problem: Physical Therapy Goal    Goal Priority Disciplines Outcome Goal Variances Interventions   Physical Therapy Goal     PT/OT, PT Ongoing (interventions implemented as appropriate)     Description:  Goals to be met by: 18     Patient will increase functional independence with mobility by performin. Supine to sit with Moderate Assistance.  2. Sit to supine with Moderate Assistance.  3. Rolling to Left and Right with Minimal Assistance.   4. Sit to stand transfer with Moderate Assistance.  5. Bed to chair transfer with Maximum Assistance.  6. Gait  x 5 feet with Minimal Assistance.   7.  Pt to perf and be compliant with LE HEP to improve vascularity and mm strength.                            Time Tracking:     PT Received On:  04/27/18  PT Start Time: 1423     PT Stop Time: 1512  PT Total Time (min): 49 min     Billable Minutes: Neuromuscular Re-education 49   (co-tx with OT)    Treatment Type: Treatment  PT/PTA: PT     PTA Visit Number: 0     Petra Lake PT, DPT   4/27/2018

## 2018-04-27 NOTE — PLAN OF CARE
Problem: SLP Goal  Goal: SLP Goal  Updated Speech Language Pathology Goals  Goals expected to be met by 5/4  1. Pt will tolerate a regular diet and thin liquids with no overt s/s of aspiration.   2. Pt will demonstrate/verbalize 3 safe swallowing strategies with min cues.   3. Educate Pt on aspiration precautions and S/S aspiration  4. Pt and family will verbalize/demosntrate understanding of PMSV precautions and safety awareness with no cues.  5. Patient will complete dysphagia therapy exercises x10 each with min cues.     Speech Language Pathology Goals  Goals expected to be met by 4/27/18  1.  Pt will tolerate PMSV for 30 minutes w/ no change in respiratory status and fair voicing produced. -met  2.  Pt will participate in ongoing swallow assessment. -met  3. Pt will name up to 12 items/minute in a concrete category, 90% of attempts, Supervision, to improve cognitive organization. -discontinued  4. Pt will recall 3 related items post 3 minute filled delay, 90% of attempts, Supervision, to improve STM.-discontinued  5. Pt will answer m/u YNQ with 90% accuracy, Supervision, to improve higher-level comprehension. --discontinued  6. Pt will complete further assessment of reading/writing skills. -discontinued  7. Educate Pt on aspiration precautions and S/S aspiration. -ongoing  8. Educate Pt and family on PMSV precautions and safety awareness. -met; reinforcment to continue  Goals expected to be met by 4/20/18  1.  Pt will tolerate PMSV for 30 minutes w/ no change in respiratory status and fair voicing produced.  2.  Pt will complete further evaluation of cognitive-linguistic communication skills to determine the need for additional goals. Met 4/19  3. Pt will name up to 12 items/minute in a concrete category, 90% of attempts, Supervision, to improve cognitive organization  4. Pt will recall 3 related items post 3 minute filled delay, 90% of attempts, Supervision, to improve STM   5. Pt will answer m/u YNQ with 90%  accuracy, Supervision, to improve higher-level comprehension   6. Pt will complete further assessment of reading/writing skills  7. Pending MD clearance, Pt will complete further assessment of swallow fx   8. Educate Pt and family on PMSV precautions and safety awareness    Goals expected to be met by 4/13/18   1.  Pt will tolerate PMSV for 3 min w/ no change in respiratory status and fair voicing produced. Met   2.  Pt will complete further evaluation of cognitive-linguistic communication skills to determine the need for additional goals.             Outcome: Ongoing (interventions implemented as appropriate)  Goals updated to discontinue all cognitive-linguistic goals as patient is back to baseline for language and cognition. Dysphagia and PMSV goals remain appropriate. ST will continue to follow.   JOSELYN Michele., CCC-SLP  Pager: [phone]  04/27/2018

## 2018-04-27 NOTE — PROGRESS NOTES
[facility]  Heart Transplant  Progress Note    Patient Name: Lv Crocker  MRN: [id number]  Admission Date: 3/11/2018  Hospital Length of Stay: 46 days  Attending Physician: Behzad Lara Jr.,*  Primary Care Provider: Philippe Mohr MD  Principal Problem:Acute respiratory failure with hypoxia    Subjective:     Interval History: No issues overnight. He states cough has improved. He does states that he is having some anxiety attacks. Alprazolam helps a bit.     Continuous Infusions:   insulin (HUMAN R) infusion (adults) 0.8 Units/hr (04/27/18 0748)     Scheduled Meds:   sodium chloride 0.9%   Intravenous Once    sodium chloride 0.9%   Intravenous Once    acetaminophen  999.0148 mg Per G Tube TID    cetirizine  5 mg Per G Tube Daily    chlorhexidine  15 mL Mouth/Throat BID    epoetin jose miguel (PROCRIT) injection  4,000 Units Intravenous Every Mon, Wed, Fri    escitalopram oxalate  20 mg Per G Tube Daily    famotidine  20 mg Per G Tube QHS    guaiFENesin  600 mg Oral BID    heparin (porcine)  5,000 Units Subcutaneous Q12H    insulin aspart U-100  1-2 Units Subcutaneous TIDWM    levothyroxine  75 mcg Intravenous Daily    midodrine  10 mg Oral TID    polyethylene glycol  17 g Oral BID    predniSONE  5 mg Per G Tube Daily    QUEtiapine  50 mg Oral QHS    tacrolimus  1 mg Sublingual BID     PRN Meds:sodium chloride 0.9%, sodium chloride 0.9%, sodium chloride 0.9%, ALPRAZolam, dextromethorphan-guaifenesin  mg/5 ml, dextrose 50%, dextrose 50%, glucagon (human recombinant), glucose, glucose, heparin (porcine), insulin aspart U-100, midodrine, ondansetron, oxyCODONE, povidone-iodine, ramelteon    Review of patient's allergies indicates:   Allergen Reactions    No known drug allergies      Objective:     Vital Signs (Most Recent):  Temp: 98 °F (36.7 °C) (04/27/18 0344)  Pulse: 104 (04/27/18 0344)  Resp: 18 (04/27/18 0344)  BP: (!) 104/59 (04/27/18 0344)  SpO2: 96 %  (04/27/18 0344) Vital Signs (24h Range):  Temp:  [98 °F (36.7 °C)-98.8 °F (37.1 °C)] 98 °F (36.7 °C)  Pulse:  [101-110] 104  Resp:  [16-18] 18  SpO2:  [90 %-100 %] 96 %  BP: ()/(52-65) 104/59     Patient Vitals for the past 72 hrs (Last 3 readings):   Weight   04/27/18 0630 75 kg (165 lb 5.5 oz)   04/26/18 0545 74.9 kg (165 lb 2 oz)   04/25/18 0500 75.9 kg (167 lb 5.3 oz)     Body mass index is 25.9 kg/m².      Intake/Output Summary (Last 24 hours) at 04/27/18 0814  Last data filed at 04/27/18 0630   Gross per 24 hour   Intake           1383.8 ml   Output                0 ml   Net           1383.8 ml     Hemodynamic Parameters:    Physical Exam   Constitutional: He is oriented to person, place, and time.   Generalized weakness but normal ROM   Neck: No JVD present. Trache in place and well  secured  Cardiovascular: Normal rate, regular rhythm and normal heart sounds.    Pulmonary/Chest: Effort normal and breath sounds decreased at RLB, no wheezes noted.   Abdominal: Soft. Bowel sounds are normal. Mild diffuse TTP with deep palpation  Musculoskeletal: Normal range of motion. He exhibits no edema or tenderness.   Neurological: He is alert and oriented to person, place, and time.   Skin: Skin is warm and dry.   Psychiatric: - able to interact and ask questions this morning  Nursing note and vitals reviewed.    Significant Labs:  CBC:    Recent Labs  Lab 04/25/18  0505 04/26/18  0430 04/27/18  0432   WBC 3.29* 4.51 4.85   RBC 2.50* 2.41* 2.50*   HGB 7.7* 7.6* 7.8*   HCT 25.5* 24.8* 25.4*    235 263   * 103* 102*   MCH 30.8 31.5* 31.2*   MCHC 30.2* 30.6* 30.7*     BNP:  No results for input(s): BNP in the last 168 hours.    Invalid input(s): BNPTRIAGELBLO  CMP:    Recent Labs  Lab 04/25/18  0505 04/26/18  0430 04/27/18  0432   * 163* 133*   CALCIUM 9.6 8.8 10.0   ALBUMIN 2.0* 2.4* 2.4*   PROT 5.9* 6.0 6.3   * 131* 131*   K 3.7 3.7 4.3   CO2 24 21* 20*   CL 94* 97 98   BUN 45* 30* 44*    CREATININE 3.3* 2.2* 3.0*   ALKPHOS 186* 199* 191*   ALT 10 9* 12   AST 16 15 17   BILITOT 0.3 0.4 0.4      Coagulation:   No results for input(s): PT, INR, APTT in the last 168 hours.  LDH:  No results for input(s): LDH in the last 72 hours.  Microbiology:  Microbiology Results (last 7 days)     Procedure Component Value Units Date/Time    Culture, Respiratory with Gram Stain [[id number]] Collected:  04/26/18 1329    Order Status:  Completed Specimen:  Respiratory from Tracheal Aspirate Updated:  04/27/18 0127     Gram Stain (Respiratory) <10 epithelial cells per low power field.     Gram Stain (Respiratory) No WBC's     Gram Stain (Respiratory) Rare Gram negative rods    Respiratory Viral Panel by PCR Migelner; Nasal Swab [[id number]] Collected:  04/26/18 1123    Order Status:  Sent Specimen:  Respiratory Updated:  04/26/18 1138    AFB Culture & Smear [[id number]] Collected:  03/14/18 1137    Order Status:  Completed Specimen:  Bronchial Wash from Amniotic Fluid Updated:  04/26/18 0927     AFB Culture & Smear Culture in progress     AFB Culture & Smear Culture in progress     AFB CULTURE STAIN No acid fast bacilli seen.        I have reviewed all pertinent labs within the past 24 hours.    Estimated Creatinine Clearance: 29.4 mL/min (A) (based on SCr of 3 mg/dL (H)).      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 Most recently has had ongoing issues with his lungs, had gotten 2 thoracenteses, after which he underwent VATS 01/19/18 followed by pleurex catheter placement and effusion drainage 01/11/18, subsequently had it removed 02/07/18 after drainage had decreased despite multiple attempts to drain it. Has been having significant coughing fits that result in him being near-syncopal at times, although difficult to say  if he has passed out or not- patient most recently was admitted to the hospital for increased AGUILAR, coughing and pre-syncope 02/11-02/14/18 at [facility].   Patient was started on antibiotics for suspected bacterial infection, with some diarrhea, bronchitis, and possible pneumonia. Ct chest showed some pleural fluid collection and after talking with Dr. James, we arranged for him to receive a diagnostic tap with IR, from which the fluid collection demonstrated no growth or significant findings at all really. Cell counts do not appear to have been sent but will recheck. Patient states since his hospital stay, yesterday had a significant coughing fit again, after which he nearly passed out, is being seen in clinic today for this. Denies any cardiopulmonary complaints, no leg swelling, has some abdominal swelling, which is moderate for him. Denies significant shortness of breath with mild exertion, had 6MWT yesterday to see if he qualified for home O2, and his O2 sats actually improved after recovery from where he started initially. He and his wife admit he is in bed most days, not very active.     Mr. Crocker presented to the ED 3/11/18 with approximately 3 weeks of worsening diarrhea and 2-3 days of sinus pressure, drainage, and worsened cough.  He notes that he had a coughing fit last night and passed out, coughed throughout most of the night.  This also happened on 2/25/18.  He last saw Dr Ferreira in clinic on 2/20/18 where he was taken off myfortic and switched to cellcept (he hadn't been on cellcept because of leukopenia when they tried post-transplant).  Though his diarrhea had improved after his last discharge, he states it has increased now to 15x/day, clear/yellow/green, no fevers, no exacerbating foods per say.  He was taken back off the cellcept and placed back on myfortic this past week and has not noticed immediate change in diarrhea. He also reports his baseline coughing fits havent improved and are minimally  responsive to tessalon pearls.  Came on last night, he lost consciousness during a fit once which is relatively normal for him, and had two more during HPI.  He notes no sick contacts but does state he's had some URI symptoms as above.  His baseline vitals are usually -120 and BP 90s/60s-110s/70s.  He reports a history of intermittent diarrhea - did have Cdiff many years ago but this smells different.  No abdominal pain, no nausea, no vomiting.  No blood in diarrhea.  Appears last c-scope in 2015 with no evidence of infection or inflammatory processes.  He does report IBS which is different that this.       * Acute respiratory failure with hypoxia    - S/P Bronch and intubation 3/14 now post tracheostomy due to inability to extubate  - Pulmonology signed off. Completely weaned off vent.   - RSV positive early and completed course of Ribavarin/IVIG.  - ID had been following. Completed 7 day course of Meropenem/Linezolid earlier in hospitalization  - Appreciate PT/OT/Wound care.  - Cough improved a bit overnight. CXR with possible new LLL infiltrate when compared to prior on 4/9. Repeat Respiratory Viral Panel and tracheal sputum cultures pending. Hold off on abx for now. Will order some CPT.         Acute renal failure with acute tubular necrosis superimposed on stage 3 chronic kidney disease    - Appreciate Nephrology recs.   - Continue middorine for pressure support  - HD per nephrology. Plan for HD today.        Heart transplanted    - TTE 4/24/18 showed normal graft function but has known history of restrictive CM post transplant.  - Continue pred 5, Tacro- current dose is 1/1 SL daily, adjust as needed for goal 6-10  - Cellcept remains on hold due to leukopenia        Debility    - PT/OT/SLP daily. Recommending rehab.  - Nutrition following.   - Continue tube feeds. Switched from glucerna to novasource renal   - Passed MBSS with speech. Started regular diet with thin liquids, but will continue tube feeds  at lower rate (30).         Restrictive cardiomyopathy    - No changes from before.   - Has known history of recurrent ascites and pleural effusions   - S/P thoracentesis X 2, followed by VATS/pleurex cath placement (January 2018) with removal of pleurex (February 2018) and 3rd thoracentesis 2/14/18 with no significant findings on fluid removal.  - repeat echo reviewed and in epic        Anemia    - Transfused 2 units of PRBCs on 4/11 during HD  - Nephrology has resumed ProCrit         Type 1 diabetes mellitus with stage 3 chronic kidney disease    - Appreciate Endocrine's recs  - Insulin gtt per endocrine recs. Adjust as needed  - per endocrine please call when he is off tube feeds to adjust ggt and transition to rehab/outpatient regimen        Gastroparesis    - abdominal pain improved from weekend  - BM x4 on 4/25, passing gas  - KUB without evidence of bowel obstructionunclear if  worsening gastroparesis or new partial obstruction  - Per GI recs patient should tolerate up to 400 mL residuals. Will wean narcotics. Can complete CT AP if needed however pain is mild and improving at this point  - CMV PCR undetected        Alteration in skin integrity    - wound care following        Hypothyroidism due to Hashimoto's thyroiditis    - Appreciate Endocrine's recs  - Continue Levothyroxine 75mcg IV daily        Anxiety    - Continue PTA  Escitalopram  - Increased Lexapro to 20mg daily per psych recs  - Started Seroquel as per psych rec.  - Add prn xanex for PRN anxiety (low threshold to stop if patient has recurrent hallucinations), may need long acting anti anxiolytic long term            Tim Mao NP  Heart Transplant  [facility]

## 2018-04-27 NOTE — NURSING
Rounds Report: Attended interdisciplinary rounds this morning with the transplant team including SW, physicians, fellows,  mid-level providers, and transplant coordinators.  Discussed plan of care, including cough better. Zac has given a chair date of May 4, will try to get to rehab Thursday, May 3, 2018.

## 2018-04-27 NOTE — ASSESSMENT & PLAN NOTE
- Appreciate Nephrology recs.   - Continue middorine for pressure support  - HD per nephrology. Plan for HD today.

## 2018-04-27 NOTE — ASSESSMENT & PLAN NOTE
continue iv levothyroxine 75mcg daily while inpt and on continuous TF   Switch to LT4 137mcg daily PO/G tube upon discharge/once TF changed to nocturnal regimen.   Separate LT4 1 hr from TF to ensure proper absorption

## 2018-04-27 NOTE — PT/OT/SLP PROGRESS
Speech Language Pathology Treatment    Patient Name:  Lv Crocker   MRN:  [id number]   [id number] A    Admitting Diagnosis: Acute respiratory failure with hypoxia    Recommendations:                General Recommendations:  Dysphagia therapy, Speech/language therapy, Cognitive-linguistic therapy and One Way Speaking Valve  Diet recommendations:  Regular, Thin (with a chin tuck in isolation of food)   Aspiration Precautions:   · CHIN TUCK to chest for all swallows  · Liquids in isolation of food  · 1 bite/sip at a time and slow rate (to allow increased time to complete spontaneous multiple swallows),   · Feed only when awake/alert,   · Frequent oral care,   · HOB to 90 degrees,   · Meds whole buried in puree,   · Monitor for s/s of aspiration,   · Remain upright 30 minutes post meal,   · Small bites/sips and   · Strict aspiration precautions   · Continue to monitor for signs and symptoms of aspiration and discontinue oral feeding should you notice any of the following: watery eyes, reddened facial area, wet vocal quality, increased work of breathing, change in respiratory status, increased congestion, coughing, fever, etc.  General Precautions: Standard, fall, aspiration  Communication strategies:  One way speaking valve    Subjective     Patient awake and cooperative. RN present in room. RN and SLP repositioning patient.   Patient goals: to eat lunch     Pain/Comfort:  ·  no pain reported    Objective:     Has the patient been evaluated by SLP for swallowing?   Yes  Keep patient NPO? No   Current Respiratory Status: speaking valve, trach cuffless       Patient seen to assess tolerance of diet s/p MBSS with PMSV in place. Patient reports adequate tolerance of meals with no noted increased coughing during po intake. Patient with noted dry cough prior to po trials. Patient assessed with single sips of water via straw x3, bite of pudding x2, and bites of chips x3. Patient required min cues to follow swallowing  precautions 2/2 mildly distracted by talk to family in room. Prior to po trials, patient independently recalled 4 safe swallowing precautions. SLP reviewed all safe swallowing precautions and MBSS results. Patient verbalized understanding. Dysphagia therapy exercises introduced including: effortful swallow, supraglottic swallow, falsetto, Jenni, and hard /g/ and /k/ words. Patient demonstrated understanding via teach back of all exercises. Whiteboard current. No further questions    Assessment:     Lv Crocker is a 42 y.o. male with an SLP diagnosis of Dysphagia and One Way Speaking Valve Training. ST will continue to follow.     Goals:    SLP Goals        Problem: SLP Goal    Goal Priority Disciplines Outcome   SLP Goal     SLP Ongoing (interventions implemented as appropriate)   Description:  Updated Speech Language Pathology Goals  Goals expected to be met by 5/4  1. Pt will tolerate a regular diet and thin liquids with no overt s/s of aspiration.   2. Pt will demonstrate/verbalize 3 safe swallowing strategies with min cues.   3. Educate Pt on aspiration precautions and S/S aspiration  4. Pt and family will verbalize/demosntrate understanding of PMSV precautions and safety awareness with no cues.  5. Patient will complete dysphagia therapy exercises x10 each with min cues.     Speech Language Pathology Goals  Goals expected to be met by 4/27/18  1.  Pt will tolerate PMSV for 30 minutes w/ no change in respiratory status and fair voicing produced. -met  2.  Pt will participate in ongoing swallow assessment. -met  3. Pt will name up to 12 items/minute in a concrete category, 90% of attempts, Supervision, to improve cognitive organization. -discontinued  4. Pt will recall 3 related items post 3 minute filled delay, 90% of attempts, Supervision, to improve STM.-discontinued  5. Pt will answer m/u YNQ with 90% accuracy, Supervision, to improve higher-level comprehension. --discontinued  6. Pt will complete  further assessment of reading/writing skills. -discontinued  7. Educate Pt on aspiration precautions and S/S aspiration. -ongoing  8. Educate Pt and family on PMSV precautions and safety awareness. -met; reinforcment to continue  Goals expected to be met by 4/20/18  1.  Pt will tolerate PMSV for 30 minutes w/ no change in respiratory status and fair voicing produced.  2.  Pt will complete further evaluation of cognitive-linguistic communication skills to determine the need for additional goals. Met 4/19  3. Pt will name up to 12 items/minute in a concrete category, 90% of attempts, Supervision, to improve cognitive organization  4. Pt will recall 3 related items post 3 minute filled delay, 90% of attempts, Supervision, to improve STM   5. Pt will answer m/u YNQ with 90% accuracy, Supervision, to improve higher-level comprehension   6. Pt will complete further assessment of reading/writing skills  7. Pending MD clearance, Pt will complete further assessment of swallow fx   8. Educate Pt and family on PMSV precautions and safety awareness    Goals expected to be met by 4/13/18   1.  Pt will tolerate PMSV for 3 min w/ no change in respiratory status and fair voicing produced. Met   2.  Pt will complete further evaluation of cognitive-linguistic communication skills to determine the need for additional goals.                               Plan:     · Patient to be seen:  5 x/week   · Plan of Care expires:  05/05/18  · Plan of Care reviewed with:  patient, family   · SLP Follow-Up:  Yes       Discharge recommendations:  rehabilitation facility     Time Tracking:     SLP Treatment Date:   04/27/18  Speech Start Time:  1355  Speech Stop Time:  1420     Speech Total Time (min):  25 min    Billable Minutes: Treatment Swallowing Dysfunction 25    YAMILET Ashley, CCC-SLP   Pager: [phone]  04/27/2018

## 2018-04-27 NOTE — PLAN OF CARE
Problem: Occupational Therapy Goal  Goal: Occupational Therapy Goal  Goals to be met by: 5/8/18    Pt will follow 75% of motor commands with <2 verbal cues for repetition of task to maximize engagement in grooming task.--MET  Pt will complete feeding with mod A.   Pt will complete supine with HOB slightly elevated to seated at EOB with mod A in prep for seated grooming task.  Pt will engage in BUE exercises in orders to increase strength to 3+/5 in BUE's to increase engagement in seated grooming task  Pt will sit at EOB for approx 10 minutes with mod A and unilateral UE support to complete grooming task  Pt will complete sit>stand from EOB with bed in lowest position with mod A in prep for transfer to [facility]      Outcome: Ongoing (interventions implemented as appropriate)  con't with goals  Delia To, LILYR

## 2018-04-27 NOTE — PLAN OF CARE
Problem: Physical Therapy Goal  Goal: Physical Therapy Goal  Goals to be met by: 18     Patient will increase functional independence with mobility by performin. Supine to sit with Moderate Assistance.  2. Sit to supine with Moderate Assistance.  3. Rolling to Left and Right with Minimal Assistance.   4. Sit to stand transfer with Moderate Assistance.  5. Bed to chair transfer with Maximum Assistance.  6. Gait  x 5 feet with Minimal Assistance.   7.  Pt to perf and be compliant with LE HEP to improve vascularity and mm strength.          Outcome: Ongoing (interventions implemented as appropriate)  Goals reviewed and remain appropriate. Pt progressing towards goals.    Petra Lake, PT, DPT   2018  [phone]

## 2018-04-27 NOTE — ASSESSMENT & PLAN NOTE
- PT/OT/SLP daily. Recommending rehab.  - Nutrition following.   - Continue tube feeds. Switched from glucerna to novasource renal   - Passed MBSS with speech. Started regular diet with thin liquids, but will continue tube feeds at lower rate (30).

## 2018-04-27 NOTE — ASSESSMENT & PLAN NOTE
BG goal 140-180,       Rewrite transition 0.9 u/hr to reflect decreased insulin needs on lower TF rate, continue novolog 1-2 units with meals depending on intake (which is about equal to CHO 1:20), low correction, ac/hs/0200    Once TF changed to nocturnal regimen, will plan to switch to MDI in preparation for discharge     I have asked primary team to notify me of TF regimen changes     Pt is T1DM, do not suspend insulin >1 hr   If TF held, decrease insulin rate to 0.3u/hr   (known to have controlled BG on basal needs of lev 5 daily/ 0.2u/hr during this hospitalization)        Discharge Recommendations:  Will need scheduled novolog q4 or regular q6 for TF and will need basal insulin for T1DM

## 2018-04-27 NOTE — PROGRESS NOTES
"[facility]  Endocrinology  Progress Note    Admit Date: 3/11/2018     Reason for Consult: abnormal TFTs    Surgical Procedure and Date: s/p heart transplant 2014     Diabetes diagnosis year: 7yo (T1DM)     Home Diabetes Medications:    Levemir 15u qHS  Novolog 4u AC     How often checking glucose at home? 4 times daily   BG readings on regimen: 150-200s  Hypoglycemia on the regimen?  Yes, rarely - treats appropriately (light snack, glucose tab)  Missed doses on regimen?  No        Diabetes Complications include:     Hyperglycemia, Hypoglycemia  and Diabetic chronic kidney disease          Complicating diabetes co morbidities:   N/A     HPI:   Patient is a 42 y.o. male with a diagnosis of T1DM, HTN, hypothyroidism, s/p heart transplant.  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 He required serial paracentesis and thoracentesis until he underwent a VATS with pleurX catheter placement on 1/11/2018 and removed 2/7/18.       Presented to hospital secondary to diarrhea and cough.  Upon initial labs, TSH was decreased (0.101) and free T4 1.33.  Endocrinology consulted to assist in management of these labs.  He was last seen in clinic by Kamala Vázquez NP 11/2017.   Previous hospitalization, endocrine consulted for same problem.  During that visit, recommended decreasing LT4 to 125mcg daily. Unfortunately, patient was discharged on previous dose of 150mcg daily.     Interval HPI:   Overnight events:  Trach in place    tube feedings infusing at 30cc/hr (decreased from 40cc/hr) with BG trending down this morning on transition 1.2u/hr and rate weaned to 0.8u/hr, BG at or above goal, no hypoglcyemia  Passed swallow, now on dysphagia/pureed diet  Remains on CRRT  On PDN 5mg daily   Remains IV LT4 75mcg daily    BP (!) 104/59   Pulse 104   Temp 98 °F (36.7 °C)   Resp 18   Ht 5' 7" (1.702 m)   Wt 75 " kg (165 lb 5.5 oz)   SpO2 96%   BMI 25.90 kg/m²       Labs Reviewed and Include      Recent Labs  Lab 04/27/18  0432   *   CALCIUM 10.0   ALBUMIN 2.4*   PROT 6.3   *   K 4.3   CO2 20*   CL 98   BUN 44*   CREATININE 3.0*   ALKPHOS 191*   ALT 12   AST 17   BILITOT 0.4     Lab Results   Component Value Date    WBC 4.85 04/27/2018    HGB 7.8 (L) 04/27/2018    HCT 25.4 (L) 04/27/2018     (H) 04/27/2018     04/27/2018       Recent Labs  Lab 04/21/18  0334   TSH 7.085*   FREET4 0.87     Lab Results   Component Value Date    HGBA1C 5.1 04/05/2018       Nutritional status:   Body mass index is 25.9 kg/m².  Lab Results   Component Value Date    ALBUMIN 2.4 (L) 04/27/2018    ALBUMIN 2.4 (L) 04/26/2018    ALBUMIN 2.0 (L) 04/25/2018     Lab Results   Component Value Date    PREALBUMIN 13 (L) 04/08/2018    PREALBUMIN 5 (L) 03/15/2018    PREALBUMIN 18 (L) 03/24/2015       Estimated Creatinine Clearance: 29.4 mL/min (A) (based on SCr of 3 mg/dL (H)).    Accu-Checks  Recent Labs      04/25/18   1907  04/25/18   2322  04/26/18   0333  04/26/18   0801  04/26/18   1237  04/26/18   1603  04/26/18   1718  04/26/18   2207  04/27/18   0208  04/27/18   0746   POCTGLUCOSE  248*  237*  164*  169*  264*  275*  257*  222*  152*  116*       Current Medications and/or Treatments Impacting Glycemic Control  Immunotherapy:  Immunosuppressants         Stop Route Frequency     tacrolimus capsule 1 mg      -- SL 2 times daily        Steroids:   Hormones     Start     Stop Route Frequency Ordered    04/19/18 0900  predniSONE tablet 5 mg      -- PER G TUBE Daily 04/18/18 1106        Pressors:    Autonomic Drugs     Start     Stop Route Frequency Ordered    04/25/18 0900  midodrine tablet 10 mg      -- Oral 3 times daily 04/25/18 0650    04/13/18 0859  midodrine tablet 10 mg      -- Oral As needed (PRN) 04/13/18 0800        Hyperglycemia/Diabetes Medications: Antihyperglycemics     Start     Stop Route Frequency Ordered     04/26/18 2000  insulin aspart U-100 pen 0-5 Units      -- SubQ Before meals & nightly PRN 04/26/18 1904    04/26/18 1615  insulin aspart U-100 pen 1-2 Units      -- SubQ 3 times daily with meals 04/26/18 1607    04/11/18 0015  insulin regular (Humulin R) 100 Units in sodium chloride 0.9% 100 mL infusion     Question:  Insulin Rate Adjustment (DO NOT MODIFY ANSWER)  Answer:  \\[id number]    -- IV Continuous 04/10/18 2313          ASSESSMENT and PLAN    Type 1 diabetes mellitus with stage 3 chronic kidney disease    BG goal 140-180,       Rewrite transition 0.9 u/hr to reflect decreased insulin needs on lower TF rate, continue novolog 1-2 units with meals depending on intake (which is about equal to CHO 1:20), low correction, ac/hs/0200    Once TF changed to nocturnal regimen, will plan to switch to MDI in preparation for discharge     I have asked primary team to notify me of TF regimen changes     Pt is T1DM, do not suspend insulin >1 hr   If TF held, decrease insulin rate to 0.3u/hr   (known to have controlled BG on basal needs of lev 5 daily/ 0.2u/hr during this hospitalization)        Discharge Recommendations:  Will need scheduled novolog q4 or regular q6 for TF and will need basal insulin for T1DM         Hypothyroidism due to Hashimoto's thyroiditis    continue iv levothyroxine 75mcg daily while inpt and on continuous TF   Switch to LT4 137mcg daily PO/G tube upon discharge/once TF changed to nocturnal regimen.   Separate LT4 1 hr from TF to ensure proper absorption             On enteral nutrition     TF at goal May increase insulin needs             Current chronic use of systemic steroids     Can increase insulin requirement          Acute respiratory failure with hypoxia     Per primary  S/p trach.  Practicing with speech valve.  Remains NPO            Palmira Dorsey MD  Endocrinology  [facility] G[facility]IA, Betzaida Hurtado MD,   have personally taken the history and examined the patient and agree with the resident's note as stated above.

## 2018-04-27 NOTE — PLAN OF CARE
Problem: Patient Care Overview  Goal: Plan of Care Review  Outcome: Ongoing (interventions implemented as appropriate)  Pt is AAOx4 in bed wearing non-skid footwear, bed in low/locked position and with call bell within reach. Pt reminded to use call bell to call for assistance, pt verbalizes understanding. Wife at bedside. Patient turned frequently to prevent skin breakdown. Pt is afebrile at this time. Proper hand hygiene performed before and after pt care activities. Mid-line abdominal incision LETICIA with staples, free from SSI. LLQ g-tube with tube feedings infusing at 30cc/hr. Trach in place with #6 joe, patient 94-98% on RA. Productive cough present. Insulin gtt infusing at 1.2units/hr. BG monitored AC, HS, 2am. This shift has been 222 and 152, sliding scale coverage administered as needed. Patient c/o anxiety, PRN Xanax administered with only small amount of relief reported by patient.

## 2018-04-27 NOTE — PT/OT/SLP PROGRESS
Occupational Therapy   Treatment    Name: Lv Crocker  MRN: [id number]  Admitting Diagnosis:  Acute respiratory failure with hypoxia  23 Days Post-Op    Recommendations:     Discharge Recommendations: rehabilitation facility  Discharge Equipment Recommendations:     Barriers to discharge:  Decreased caregiver support, Inaccessible home environment    Subjective     Communicated with: nsg prior to session.cc with PT  Pain/Comfort:  · Pain Rating 1: 2/10 (pain in feet)    Patients cultural, spiritual, Mormon conflicts given the current situation: none    Objective:     Patient found with: peripheral IV, central line, PEG Tube, telemetry, tracheostomy    General Precautions: Standard, fall   Orthopedic Precautions:N/A   Braces:       Occupational Performance:    Bed Mobility:    · Total assist sup to sit and sit to supine     Functional Mobility/Transfers:  · Sit to stand x 2 reps with total assist x2, pt trying to assist as much as able  · Scooting EOB x 3 reps with total assist  · EOB/OOB x ~40 min  · Sitting balance varies max assist when fatigues (and anxious) to close SBA for static sitting x ~15-30 sec  · Functional Mobility:     Activities of Daily Living:  · Wiping face with towel with max assist, able to bring towel ~3/4 way to face, assists with wiping    Patient left HOB up with call button in reach and family (dtr in law to be and her mom) present    [facility] 6 Click:  [facility] Total Score: 6    Treatment & Education:  Pt performed seated BUE scapular protraction/retraction, reaching, wt. Shifts for core strengthening, trunk extension, standing reps and scooting EOB, rest between activities and breathing exercises, calming techniques throughout.  Education:    Assessment:     Lv Crocker is a 42 y.o. male with a medical diagnosis of Acute respiratory failure with hypoxia.  He presents with improved strength, balance, tolerance and mobility.  Performance deficits affecting function are  weakness, impaired endurance, gait instability, impaired functional mobilty, impaired self care skills, impaired balance, decreased upper extremity function, decreased lower extremity function, decreased ROM, pain, impaired coordination, impaired fine motor, impaired skin, impaired cardiopulmonary response to activity.      Rehab Prognosis:  good; patient would benefit from acute skilled OT services to address these deficits and reach maximum level of function.       Plan:     Patient to be seen 5 x/week to address the above listed problems via self-care/home management, therapeutic activities, therapeutic exercises, neuromuscular re-education  · Plan of Care Expires: 05/06/18  · Plan of Care Reviewed with: patient, family    This Plan of care has been discussed with the patient who was involved in its development and understands and is in agreement with the identified goals and treatment plan    GOALS:    Occupational Therapy Goals        Problem: Occupational Therapy Goal    Goal Priority Disciplines Outcome Interventions   Occupational Therapy Goal     OT, PT/OT Ongoing (interventions implemented as appropriate)    Description:  Goals to be met by: 5/8/18    Pt will follow 75% of motor commands with <2 verbal cues for repetition of task to maximize engagement in grooming task.--MET  Pt will complete feeding with mod A.   Pt will complete supine with HOB slightly elevated to seated at EOB with mod A in prep for seated grooming task.  Pt will engage in BUE exercises in orders to increase strength to 3+/5 in BUE's to increase engagement in seated grooming task  Pt will sit at EOB for approx 10 minutes with mod A and unilateral UE support to complete grooming task  Pt will complete sit>stand from EOB with bed in lowest position with mod A in prep for transfer to [facility]                       Time Tracking:     OT Date of Treatment: 04/27/18  OT Start Time: 1423  OT Stop Time: 1510  OT Total Time (min): 47 min    Billable  Minutes:Therapeutic Activity 15 min  Therapeutic Exercise 32 min    Delia To OT  4/27/2018

## 2018-04-27 NOTE — PROGRESS NOTES
Patient arrived on unit in bed. Unable to obtain patient weight. Dialysis initiated via left CVC. Ports aspirated and flushed with slight resistance on venous port. Lines connected and secured. Orders and machine settings verified. Tx started.

## 2018-04-27 NOTE — ASSESSMENT & PLAN NOTE
- baseline sCr 2.6-3.0 consistent with CKD stage IV  - anuric CACHORRO; likely ischemic ATN from prolonged pre-renal state (diarrhea) in setting of prograf renal vasoconstriction; cannot r/o septic ATN or Prograf toxicity  - SLED started 3/14. TDC placed 4/4/18.   - remains anuric  -Per [facility] Rehab, patient approve for [facility] inpatient rehab (opens May 1) when off insulin gtt. SW to assisting to outpatient HD that will take patient with Trach.    -4/27 Patient seen in [facility]. Tolerating 1 L UF with albumin and midodrine PRN.  Continue MWF HD while inpatient. 2nd time machine clotted 2 hrs in HD treatment.  Heparin bolus prior to dialysis.

## 2018-04-27 NOTE — SUBJECTIVE & OBJECTIVE
Interval History: Patient seen in [facility]. Initial required cath reversal but then when reverse back no issues. Machine clotted 2 hrs in 3 hr tx.     Review of patient's allergies indicates:   Allergen Reactions    No known drug allergies      Current Facility-Administered Medications   Medication Frequency    0.9%  NaCl infusion Once    0.9%  NaCl infusion PRN    0.9%  NaCl infusion PRN    0.9%  NaCl infusion PRN    0.9%  NaCl infusion Once    acetaminophen oral solution 999.0148 mg TID    ALPRAZolam tablet 0.5 mg Q8H PRN    cetirizine tablet 5 mg Daily    chlorhexidine 0.12 % solution 15 mL BID    dextromethorphan-guaifenesin  mg/5 ml liquid 5 mL Q4H PRN    dextrose 50% injection 12.5 g PRN    dextrose 50% injection 25 g PRN    epoetin jose miguel injection 4,000 Units Every Mon, Wed, Fri    escitalopram oxalate tablet 20 mg Daily    famotidine tablet 20 mg QHS    glucagon (human recombinant) injection 1 mg PRN    glucose chewable tablet 16 g PRN    glucose chewable tablet 24 g PRN    guaiFENesin 12 hr tablet 600 mg BID    heparin (porcine) injection 1,000 Units PRN    heparin (porcine) injection 5,000 Units Q12H    insulin aspart U-100 pen 0-5 Units QID (AC + HS) PRN    insulin aspart U-100 pen 1-2 Units TIDWM    insulin regular (Humulin R) 100 Units in sodium chloride 0.9% 100 mL infusion Continuous    levothyroxine injection 75 mcg Daily    midodrine tablet 10 mg PRN    midodrine tablet 10 mg TID    ondansetron disintegrating tablet 4 mg Q6H PRN    oxyCODONE 5 mg/5 mL solution 5 mg Q6H PRN    polyethylene glycol packet 17 g BID    povidone-iodine 10 % ointment PRN    predniSONE tablet 5 mg Daily    QUEtiapine tablet 50 mg QHS    ramelteon tablet 8 mg Nightly PRN    tacrolimus capsule 1 mg BID       Objective:     Vital Signs (Most Recent):  Temp: 98.2 °F (36.8 °C) (04/27/18 0830)  Pulse: 106 (04/27/18 1000)  Resp: 18 (04/27/18 0830)  BP: (!) 107/57 (04/27/18 1000)  SpO2: 96 %  (04/27/18 0344)  O2 Device (Oxygen Therapy): room air (04/27/18 0324) Vital Signs (24h Range):  Temp:  [98 °F (36.7 °C)-98.8 °F (37.1 °C)] 98.2 °F (36.8 °C)  Pulse:  [101-108] 106  Resp:  [17-18] 18  SpO2:  [90 %-98 %] 96 %  BP: ()/(50-76) 107/57     Weight: 75 kg (165 lb 5.5 oz) (04/27/18 0630)  Body mass index is 25.9 kg/m².  Body surface area is 1.88 meters squared.    I/O last 3 completed shifts:  In: 1963.8 [P.O.:150; I.V.:28.8; NG/GT:1785]  Out: 0     Physical Exam   Constitutional: He appears well-developed. No distress.   Trach   HENT:   Head: Normocephalic and atraumatic.   Eyes: Conjunctivae and EOM are normal.   Cardiovascular: Regular rhythm.  Tachycardia present.    Pulmonary/Chest: He has no wheezes. He has no rales.   L IJ TDC   Abdominal: Soft. He exhibits no distension.   LUQ PEG   Neurological: He is alert.       Significant Labs:  All labs within the past 24 hours have been reviewed.     Significant Imaging:  Labs: Reviewed

## 2018-04-27 NOTE — ASSESSMENT & PLAN NOTE
- TTE 4/24/18 showed normal graft function but has known history of restrictive CM post transplant.  - Continue pred 5, [place]- current dose is 1/1 SL daily, adjust as needed for goal 6-10  - Cellcept remains on hold due to leukopenia

## 2018-04-27 NOTE — PROGRESS NOTES
Dialysis tx completed. 3 hours. 600 ml removed. Left CVC locked with heparin, capped and secured. Tolerated tx well. Report called to Magalis CHRISTINA.

## 2018-04-27 NOTE — SUBJECTIVE & OBJECTIVE
Interval History: No issues overnight. He states cough has improved. He does states that he is having some anxiety attacks. Alprazolam helps a bit.     Continuous Infusions:   insulin (HUMAN R) infusion (adults) 0.8 Units/hr (04/27/18 0748)     Scheduled Meds:   sodium chloride 0.9%   Intravenous Once    sodium chloride 0.9%   Intravenous Once    acetaminophen  999.0148 mg Per G Tube TID    cetirizine  5 mg Per G Tube Daily    chlorhexidine  15 mL Mouth/Throat BID    epoetin jose miguel (PROCRIT) injection  4,000 Units Intravenous Every Mon, Wed, Fri    escitalopram oxalate  20 mg Per G Tube Daily    famotidine  20 mg Per G Tube QHS    guaiFENesin  600 mg Oral BID    heparin (porcine)  5,000 Units Subcutaneous Q12H    insulin aspart U-100  1-2 Units Subcutaneous TIDWM    levothyroxine  75 mcg Intravenous Daily    midodrine  10 mg Oral TID    polyethylene glycol  17 g Oral BID    predniSONE  5 mg Per G Tube Daily    QUEtiapine  50 mg Oral QHS    tacrolimus  1 mg Sublingual BID     PRN Meds:sodium chloride 0.9%, sodium chloride 0.9%, sodium chloride 0.9%, ALPRAZolam, dextromethorphan-guaifenesin  mg/5 ml, dextrose 50%, dextrose 50%, glucagon (human recombinant), glucose, glucose, heparin (porcine), insulin aspart U-100, midodrine, ondansetron, oxyCODONE, povidone-iodine, ramelteon    Review of patient's allergies indicates:   Allergen Reactions    No known drug allergies      Objective:     Vital Signs (Most Recent):  Temp: 98 °F (36.7 °C) (04/27/18 0344)  Pulse: 104 (04/27/18 0344)  Resp: 18 (04/27/18 0344)  BP: (!) 104/59 (04/27/18 0344)  SpO2: 96 % (04/27/18 0344) Vital Signs (24h Range):  Temp:  [98 °F (36.7 °C)-98.8 °F (37.1 °C)] 98 °F (36.7 °C)  Pulse:  [101-110] 104  Resp:  [16-18] 18  SpO2:  [90 %-100 %] 96 %  BP: ()/(52-65) 104/59     Patient Vitals for the past 72 hrs (Last 3 readings):   Weight   04/27/18 0630 75 kg (165 lb 5.5 oz)   04/26/18 0545 74.9 kg (165 lb 2 oz)   04/25/18  0500 75.9 kg (167 lb 5.3 oz)     Body mass index is 25.9 kg/m².      Intake/Output Summary (Last 24 hours) at 04/27/18 0814  Last data filed at 04/27/18 0630   Gross per 24 hour   Intake           1383.8 ml   Output                0 ml   Net           1383.8 ml     Hemodynamic Parameters:    Physical Exam   Constitutional: He is oriented to person, place, and time.   Generalized weakness but normal ROM   Neck: No JVD present. Trache in place and well  secured  Cardiovascular: Normal rate, regular rhythm and normal heart sounds.    Pulmonary/Chest: Effort normal and breath sounds decreased at RLB, no wheezes noted.   Abdominal: Soft. Bowel sounds are normal. Mild diffuse TTP with deep palpation  Musculoskeletal: Normal range of motion. He exhibits no edema or tenderness.   Neurological: He is alert and oriented to person, place, and time.   Skin: Skin is warm and dry.   Psychiatric: - able to interact and ask questions this morning  Nursing note and vitals reviewed.    Significant Labs:  CBC:    Recent Labs  Lab 04/25/18  0505 04/26/18  0430 04/27/18  0432   WBC 3.29* 4.51 4.85   RBC 2.50* 2.41* 2.50*   HGB 7.7* 7.6* 7.8*   HCT 25.5* 24.8* 25.4*    235 263   * 103* 102*   MCH 30.8 31.5* 31.2*   MCHC 30.2* 30.6* 30.7*     BNP:  No results for input(s): BNP in the last 168 hours.    Invalid input(s): BNPTRIAGELBLO  CMP:    Recent Labs  Lab 04/25/18  0505 04/26/18  0430 04/27/18  0432   * 163* 133*   CALCIUM 9.6 8.8 10.0   ALBUMIN 2.0* 2.4* 2.4*   PROT 5.9* 6.0 6.3   * 131* 131*   K 3.7 3.7 4.3   CO2 24 21* 20*   CL 94* 97 98   BUN 45* 30* 44*   CREATININE 3.3* 2.2* 3.0*   ALKPHOS 186* 199* 191*   ALT 10 9* 12   AST 16 15 17   BILITOT 0.3 0.4 0.4      Coagulation:   No results for input(s): PT, INR, APTT in the last 168 hours.  LDH:  No results for input(s): LDH in the last 72 hours.  Microbiology:  Microbiology Results (last 7 days)     Procedure Component Value Units Date/Time    Culture,  Respiratory with Gram Stain [[id number]] Collected:  04/26/18 1329    Order Status:  Completed Specimen:  Respiratory from Tracheal Aspirate Updated:  04/27/18 0127     Gram Stain (Respiratory) <10 epithelial cells per low power field.     Gram Stain (Respiratory) No WBC's     Gram Stain (Respiratory) Rare Gram negative rods    Respiratory Viral Panel by PCR [facility]; Nasal Swab [[id number]] Collected:  04/26/18 1123    Order Status:  Sent Specimen:  Respiratory Updated:  04/26/18 1138    AFB Culture & Smear [[id number]] Collected:  03/14/18 1137    Order Status:  Completed Specimen:  Bronchial Wash from Amniotic Fluid Updated:  04/26/18 0927     AFB Culture & Smear Culture in progress     AFB Culture & Smear Culture in progress     AFB CULTURE STAIN No acid fast bacilli seen.        I have reviewed all pertinent labs within the past 24 hours.    Estimated Creatinine Clearance: 29.4 mL/min (A) (based on SCr of 3 mg/dL (H)).

## 2018-04-27 NOTE — PROGRESS NOTES
[facility]  Nephrology  Progress Note    Patient Name: Lv Crocker  MRN: [id number]  Admission Date: 3/11/2018  Hospital Length of Stay: 46 days  Attending Provider: Behzad Lara Jr.,*   Primary Care Physician: Philippe Mohr MD  Principal Problem:Acute respiratory failure with hypoxia    Subjective:     HPI: Mr. Crocker is a 43 yo WM with T1DM, Hashimoto thyroiditis, h/o OHTx 11/2014, and CKD stage 4 who was admitted on 3/11/18 for persistent diarrhea. He reported a 3 week history of diarrhea up to 15x/day. Associated symptoms including URI symptoms, coughing fits, and coughing syncope. Prograf level was 14.3. His post-heart transplant course has been complicated by AMR 4/2015, CMV, post-transplant restrictive cardiomyopathy, recurrent pleural effusions/ascites requiring monthly thoracenteses/paracenteses, VATS 1/11/18 with Pleur-X catheter (now removed), and CKD stage 4 with baseline sCr 2.6-3.0. Baseline -120s and baseline BP 90s-110s/60-70s. Upon admission he was started on IVF and diarrhea workup was initiated. On 3/12 he was noted to have decreased UOP despite fluids; NS was increased to 100cc/hr. He was scheduled for a colonoscopy on 3/13 however procedure was cancelled due to hypoxia and fever. He was transferred to the ICU for closer monitoring. He continued to have oliguria overnight. Bladder scan revealed 200cc of urine but patient refused osullivan catheter placement. Wife reports that patient always has a hard time urinating again after osullivan catheters are placed/removed so he refuses them. He remained hypoxic despite FiO2 70% and ABG revealed combined respiratory and metabolic acidosis with pH 7.17. He was started on BiPAP as well as a bicarb infusion at 50cc/hr. ABG with mild improvement. AM labs revealed K 6.2 and he was shifted and given kayexalate.Trialysis catheter was placed this morning and he subsequently developed hypotension and was started on levophed.  He was intubated later this morning. Nephrology consulted for CACHORRO. All history obtained by primary team and chart review as patient was intubated/sedated on exam. Consent for dialysis obtained by patient's wife and placed in chart. Of note, both of patient's parents were on dialysis.     Interval History: Patient seen in [facility]. Initial required cath reversal but then when reverse back no issues. Machine clotted 2 hrs in 3 hr tx.     Review of patient's allergies indicates:   Allergen Reactions    No known drug allergies      Current Facility-Administered Medications   Medication Frequency    0.9%  NaCl infusion Once    0.9%  NaCl infusion PRN    0.9%  NaCl infusion PRN    0.9%  NaCl infusion PRN    0.9%  NaCl infusion Once    acetaminophen oral solution 999.0148 mg TID    ALPRAZolam tablet 0.5 mg Q8H PRN    cetirizine tablet 5 mg Daily    chlorhexidine 0.12 % solution 15 mL BID    dextromethorphan-guaifenesin  mg/5 ml liquid 5 mL Q4H PRN    dextrose 50% injection 12.5 g PRN    dextrose 50% injection 25 g PRN    epoetin jose miguel injection 4,000 Units Every Mon, Wed, Fri    escitalopram oxalate tablet 20 mg Daily    famotidine tablet 20 mg QHS    glucagon (human recombinant) injection 1 mg PRN    glucose chewable tablet 16 g PRN    glucose chewable tablet 24 g PRN    guaiFENesin 12 hr tablet 600 mg BID    heparin (porcine) injection 1,000 Units PRN    heparin (porcine) injection 5,000 Units Q12H    insulin aspart U-100 pen 0-5 Units QID (AC + HS) PRN    insulin aspart U-100 pen 1-2 Units TIDWM    insulin regular (Humulin R) 100 Units in sodium chloride 0.9% 100 mL infusion Continuous    levothyroxine injection 75 mcg Daily    midodrine tablet 10 mg PRN    midodrine tablet 10 mg TID    ondansetron disintegrating tablet 4 mg Q6H PRN    oxyCODONE 5 mg/5 mL solution 5 mg Q6H PRN    polyethylene glycol packet 17 g BID    povidone-iodine 10 % ointment PRN    predniSONE tablet 5 mg Daily     QUEtiapine tablet 50 mg QHS    ramelteon tablet 8 mg Nightly PRN    tacrolimus capsule 1 mg BID       Objective:     Vital Signs (Most Recent):  Temp: 98.2 °F (36.8 °C) (04/27/18 0830)  Pulse: 106 (04/27/18 1000)  Resp: 18 (04/27/18 0830)  BP: (!) 107/57 (04/27/18 1000)  SpO2: 96 % (04/27/18 0344)  O2 Device (Oxygen Therapy): room air (04/27/18 0324) Vital Signs (24h Range):  Temp:  [98 °F (36.7 °C)-98.8 °F (37.1 °C)] 98.2 °F (36.8 °C)  Pulse:  [101-108] 106  Resp:  [17-18] 18  SpO2:  [90 %-98 %] 96 %  BP: ()/(50-76) 107/57     Weight: 75 kg (165 lb 5.5 oz) (04/27/18 0630)  Body mass index is 25.9 kg/m².  Body surface area is 1.88 meters squared.    I/O last 3 completed shifts:  In: 1963.8 [P.O.:150; I.V.:28.8; NG/GT:1785]  Out: 0     Physical Exam   Constitutional: He appears well-developed. No distress.   Trach   HENT:   Head: Normocephalic and atraumatic.   Eyes: Conjunctivae and EOM are normal.   Cardiovascular: Regular rhythm.  Tachycardia present.    Pulmonary/Chest: He has no wheezes. He has no rales.   L IJ TDC   Abdominal: Soft. He exhibits no distension.   LUQ PEG   Neurological: He is alert.       Significant Labs:  All labs within the past 24 hours have been reviewed.     Significant Imaging:  Labs: Reviewed    Assessment/Plan:     Acute renal failure with acute tubular necrosis superimposed on stage 3 chronic kidney disease    - baseline sCr 2.6-3.0 consistent with CKD stage IV  - anuric CACHORRO; likely ischemic ATN from prolonged pre-renal state (diarrhea) in setting of prograf renal vasoconstriction; cannot r/o septic ATN or Prograf toxicity  - SLED started 3/14. TDC placed 4/4/18.   - remains anuric  -Per [facility], patient approve for [facility] inpatient rehab (opens May 1) when off insulin gtt. SW to assisting to outpatient HD that will take patient with Trach.    -4/27 Patient seen in [facility]. Tolerating 1 L UF with albumin and midodrine PRN.  Continue MWF HD while inpatient. 2nd time  machine clotted 2 hrs in HD treatment.  Heparin bolus prior to dialysis.             Thank you for your consult. I will follow-up with patient. Please contact us if you have any additional questions.    Amanda Cuellar NP  Nephrology  [facility]

## 2018-04-27 NOTE — SUBJECTIVE & OBJECTIVE
"Interval HPI:   Overnight events:  Trach in place    tube feedings infusing at 30cc/hr (decreased from 40cc/hr) with BG trending down this morning on transition 1.2u/hr and rate weaned to 0.8u/hr, BG at or above goal, no hypoglcyemia  Passed swallow, now on dysphagia/pureed diet  Remains on CRRT  On PDN 5mg daily   Remains IV LT4 75mcg daily    BP (!) 104/59   Pulse 104   Temp 98 °F (36.7 °C)   Resp 18   Ht 5' 7" (1.702 m)   Wt 75 kg (165 lb 5.5 oz)   SpO2 96%   BMI 25.90 kg/m²     Labs Reviewed and Include      Recent Labs  Lab 04/27/18  0432   *   CALCIUM 10.0   ALBUMIN 2.4*   PROT 6.3   *   K 4.3   CO2 20*   CL 98   BUN 44*   CREATININE 3.0*   ALKPHOS 191*   ALT 12   AST 17   BILITOT 0.4     Lab Results   Component Value Date    WBC 4.85 04/27/2018    HGB 7.8 (L) 04/27/2018    HCT 25.4 (L) 04/27/2018     (H) 04/27/2018     04/27/2018       Recent Labs  Lab 04/21/18  0334   TSH 7.085*   FREET4 0.87     Lab Results   Component Value Date    HGBA1C 5.1 04/05/2018       Nutritional status:   Body mass index is 25.9 kg/m².  Lab Results   Component Value Date    ALBUMIN 2.4 (L) 04/27/2018    ALBUMIN 2.4 (L) 04/26/2018    ALBUMIN 2.0 (L) 04/25/2018     Lab Results   Component Value Date    PREALBUMIN 13 (L) 04/08/2018    PREALBUMIN 5 (L) 03/15/2018    PREALBUMIN 18 (L) 03/24/2015       Estimated Creatinine Clearance: 29.4 mL/min (A) (based on SCr of 3 mg/dL (H)).    Accu-Checks  Recent Labs      04/25/18   1907  04/25/18   2322  04/26/18   0333  04/26/18   0801  04/26/18   1237  04/26/18   1603  04/26/18   1718  04/26/18   2207  04/27/18   0208  04/27/18   0746   POCTGLUCOSE  248*  237*  164*  169*  264*  275*  257*  222*  152*  116*       Current Medications and/or Treatments Impacting Glycemic Control  Immunotherapy:  Immunosuppressants         Stop Route Frequency     tacrolimus capsule 1 mg      -- SL 2 times daily        Steroids:   Hormones     Start     Stop Route Frequency " Ordered    04/19/18 0900  predniSONE tablet 5 mg      -- PER G TUBE Daily 04/18/18 1106        Pressors:    Autonomic Drugs     Start     Stop Route Frequency Ordered    04/25/18 0900  midodrine tablet 10 mg      -- Oral 3 times daily 04/25/18 0650    04/13/18 0859  midodrine tablet 10 mg      -- Oral As needed (PRN) 04/13/18 0800        Hyperglycemia/Diabetes Medications: Antihyperglycemics     Start     Stop Route Frequency Ordered    04/26/18 2000  insulin aspart U-100 pen 0-5 Units      -- SubQ Before meals & nightly PRN 04/26/18 1904    04/26/18 1615  insulin aspart U-100 pen 1-2 Units      -- SubQ 3 times daily with meals 04/26/18 1607    04/11/18 0015  insulin regular (Humulin R) 100 Units in sodium chloride 0.9% 100 mL infusion     Question:  Insulin Rate Adjustment (DO NOT MODIFY ANSWER)  Answer:  [email or web address] [id number]    -- IV Continuous 04/10/18 2311

## 2018-04-28 NOTE — SUBJECTIVE & OBJECTIVE
Interval History: Continues to do well. Remains afebrile. Spoke to me today with speaking valve. Slept okay. Eating and drinking without issues. Cough is much better.     Continuous Infusions:   insulin (HUMAN R) infusion (adults) 0.9 Units/hr (04/27/18 0930)     Scheduled Meds:   sodium chloride 0.9%   Intravenous Once    acetaminophen  999.0148 mg Per G Tube TID    albuterol-ipratropium 2.5mg-0.5mg/3mL  3 mL Nebulization Once    cetirizine  5 mg Per G Tube Daily    chlorhexidine  15 mL Mouth/Throat BID    epoetin jose miguel (PROCRIT) injection  4,000 Units Intravenous Every Mon, Wed, Fri    escitalopram oxalate  20 mg Per G Tube Daily    famotidine  20 mg Per G Tube QHS    guaiFENesin  600 mg Oral BID    heparin (porcine)  5,000 Units Subcutaneous Q12H    insulin aspart U-100  1-2 Units Subcutaneous TIDWM    levothyroxine  75 mcg Intravenous Daily    midodrine  10 mg Oral TID    polyethylene glycol  17 g Oral BID    predniSONE  5 mg Per G Tube Daily    QUEtiapine  50 mg Oral QHS    tacrolimus  3 mg Oral BID     PRN Meds:sodium chloride 0.9%, sodium chloride 0.9%, sodium chloride 0.9%, ALPRAZolam, dextromethorphan-guaifenesin  mg/5 ml, dextrose 50%, dextrose 50%, glucagon (human recombinant), glucose, glucose, heparin (porcine), insulin aspart U-100, midodrine, ondansetron, oxyCODONE, povidone-iodine, ramelteon    Review of patient's allergies indicates:   Allergen Reactions    No known drug allergies      Objective:     Vital Signs (Most Recent):  Temp: 98.3 °F (36.8 °C) (04/28/18 0354)  Pulse: (!) 114 (04/28/18 0912)  Resp: 18 (04/28/18 0354)  BP: (!) 106/52 (04/28/18 0354)  SpO2: 97 % (04/28/18 0912) Vital Signs (24h Range):  Temp:  [97.4 °F (36.3 °C)-98.6 °F (37 °C)] 98.3 °F (36.8 °C)  Pulse:  [104-114] 114  Resp:  [16-20] 18  SpO2:  [93 %-98 %] 97 %  BP: ()/(52-58) 106/52     Patient Vitals for the past 72 hrs (Last 3 readings):   Weight   04/27/18 0630 75 kg (165 lb 5.5 oz)   04/26/18  0545 74.9 kg (165 lb 2 oz)     Body mass index is 25.9 kg/m².      Intake/Output Summary (Last 24 hours) at 04/28/18 1026  Last data filed at 04/27/18 1700   Gross per 24 hour   Intake          1765.57 ml   Output             1200 ml   Net           565.57 ml     Hemodynamic Parameters:    Physical Exam   Constitutional: NAD. Appears deconditioned   Neck: No JVD present. Trach appears C/D/I   Cardiovascular: Normal rate, regular rhythm and normal heart sounds.    Pulmonary/Chest: Effort normal and breath sounds decreased at RLB, no wheezes noted.   Abdominal: Soft. Non tender. Non distended. Bowel sounds are normal.   Musculoskeletal: Normal range of motion. He exhibits no edema or tenderness.   Neurological: AAOx3. No focal deficits. Very deconditioned.   Skin: Skin is warm and dry.   Nursing note and vitals reviewed.    Significant Labs:  CBC:    Recent Labs  Lab 04/26/18  0430 04/27/18  0432 04/28/18  0500   WBC 4.51 4.85 4.09   RBC 2.41* 2.50* 2.39*   HGB 7.6* 7.8* 7.5*   HCT 24.8* 25.4* 24.7*    263 250   * 102* 103*   MCH 31.5* 31.2* 31.4*   MCHC 30.6* 30.7* 30.4*     BNP:  No results for input(s): BNP in the last 168 hours.    Invalid input(s): BNPTRIAGELBLO  CMP:    Recent Labs  Lab 04/26/18  0430 04/27/18  0432 04/28/18  0500   * 133* 207*   CALCIUM 8.8 10.0 9.2   ALBUMIN 2.4* 2.4* 2.2*   PROT 6.0 6.3 6.0   * 131* 131*   K 3.7 4.3 4.2   CO2 21* 20* 23   CL 97 98 97   BUN 30* 44* 30*   CREATININE 2.2* 3.0* 2.5*   ALKPHOS 199* 191* 187*   ALT 9* 12 11   AST 15 17 15   BILITOT 0.4 0.4 0.5      Coagulation:   No results for input(s): PT, INR, APTT in the last 168 hours.  LDH:  No results for input(s): LDH in the last 72 hours.  Microbiology:  Microbiology Results (last 7 days)     Procedure Component Value Units Date/Time    Respiratory Viral Panel by PCR [facility]; Nasal Swab [[id number]] Collected:  04/26/18 1123    Order Status:  Completed Specimen:  Respiratory Updated:  04/27/18  1110     Respiratory Virus Panel, source Nasal Swab     RVP - Adenovirus Not Detected     Comment: Detects Serotypes B and E. Detection of Serotype C may   be limited. If Adenovirus infection is suspected and a   Not Detected result is returned the sample should be   re-tested for Adenovirus using an independent method  (e.g. [employer] Adenovirus Quantitative Real-Time  PCR test.          Enterovirus Not Detected     Comment: Cross-reactivity has been observed between certain Rhinovirus  strains and the Enterovirus assay.          Human Bocavirus Not Detected     Human Coronavirus Not Detected     Comment: The Human Coronavirus assay detects Human coronavirus types  229E, OC43,NL63 and HKU1.          RVP - Human Metapneumovirus (hMPV) Not Detected     RVP - Influenza A Not Detected     Influenza A - L6B1-39 Not Detected     RVP - Influenza B Not Detected     Parainfluenza Not Detected     Respiratory Syncytial VirusVirus (RSV) A Not Detected     Comment: The Respiratory Syncytial Viral assay detects types A and B,  however it does not distinguish between the two.          RVP - Rhinovirus Not Detected     Comment: Cross-Reactivity has been observed between certain   Rhinovirus strains and the Enterovirus assay.  Target Enriched Mulitplex Polymerase Chain Reaction (TEM-PCR)  allows for the detection of multiple pathogens out of a single  reaction.  This test was developed and its performance   characteristics determined by [employer].  It has not   been cleared or approved by the U.S.Food and Drug Administration.  Results should be used in conjunction with clinical findings,   and should not form the sole basis for a diagnosis or treatment  decision.  TEM-PCR is a licensed technology of [employer].         Narrative:       Receiving Lab:->[facility]    Culture, Respiratory with Gram Stain [[id number]] Collected:  04/26/18 1329    Order Status:  Completed Specimen:  Respiratory from Tracheal  Aspirate Updated:  04/27/18 0127     Gram Stain (Respiratory) <10 epithelial cells per low power field.     Gram Stain (Respiratory) No WBC's     Gram Stain (Respiratory) Rare Gram negative rods    AFB Culture & Smear [[id number]] Collected:  03/14/18 1137    Order Status:  Completed Specimen:  Bronchial Wash from Amniotic Fluid Updated:  04/26/18 0927     AFB Culture & Smear Culture in progress     AFB Culture & Smear Culture in progress     AFB CULTURE STAIN No acid fast bacilli seen.        I have reviewed all pertinent labs within the past 24 hours.    Estimated Creatinine Clearance: 35.3 mL/min (A) (based on SCr of 2.5 mg/dL (H)).

## 2018-04-28 NOTE — PROGRESS NOTES
[facility]  Endocrinology  Progress Note    Admit Date: 3/11/2018     Reason for Consult: abnormal TFTs    Surgical Procedure and Date: s/p heart transplant 2014     Diabetes diagnosis year: 5yo (T1DM)     Home Diabetes Medications:    Levemir 15u qHS  Novolog 4u AC     How often checking glucose at home? 4 times daily   BG readings on regimen: 150-200s  Hypoglycemia on the regimen?  Yes, rarely - treats appropriately (light snack, glucose tab)  Missed doses on regimen?  No        Diabetes Complications include:     Hyperglycemia, Hypoglycemia  and Diabetic chronic kidney disease          Complicating diabetes co morbidities:   N/A     HPI:   Patient is a 42 y.o. male with a diagnosis of T1DM, HTN, hypothyroidism, s/p heart transplant.  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 He required serial paracentesis and thoracentesis until he underwent a VATS with pleurX catheter placement on 1/11/2018 and removed 2/7/18.       Presented to hospital secondary to diarrhea and cough.  Upon initial labs, TSH was decreased (0.101) and free T4 1.33.  Endocrinology consulted to assist in management of these labs.  He was last seen in clinic by Kamala Vázquez NP 11/2017.   Previous hospitalization, endocrine consulted for same problem.  During that visit, recommended decreasing LT4 to 125mcg daily. Unfortunately, patient was discharged on previous dose of 150mcg daily.     Interval HPI:   Overnight events:  Trach in place    tube feedings infusing at 30cc/hr with BG trending up this morning on transition 0.9u/hr BG at or above goal, no hypoglcyemia  Passed swallow, now on dysphagia/pureed diet, only ate breakfast yesterday  Remains on CRRT  On PDN 5mg daily   Remains IV LT4 75mcg daily    BP (!) 106/52 (BP Location: Right arm, Patient Position: Lying)   Pulse 108   Temp 98.3 °F (36.8 °C) (Oral)   Resp  "18   Ht 5' 7" (1.702 m)   Wt 75 kg (165 lb 5.5 oz)   SpO2 97%   BMI 25.90 kg/m²       Labs Reviewed and Include      Recent Labs  Lab 04/28/18  0500   *   CALCIUM 9.2   ALBUMIN 2.2*   PROT 6.0   *   K 4.2   CO2 23   CL 97   BUN 30*   CREATININE 2.5*   ALKPHOS 187*   ALT 11   AST 15   BILITOT 0.5     Lab Results   Component Value Date    WBC 4.09 04/28/2018    HGB 7.5 (L) 04/28/2018    HCT 24.7 (L) 04/28/2018     (H) 04/28/2018     04/28/2018     No results for input(s): TSH, FREET4 in the last 168 hours.  Lab Results   Component Value Date    HGBA1C 5.1 04/05/2018       Nutritional status:   Body mass index is 25.9 kg/m².  Lab Results   Component Value Date    ALBUMIN 2.2 (L) 04/28/2018    ALBUMIN 2.4 (L) 04/27/2018    ALBUMIN 2.4 (L) 04/26/2018     Lab Results   Component Value Date    PREALBUMIN 13 (L) 04/08/2018    PREALBUMIN 5 (L) 03/15/2018    PREALBUMIN 18 (L) 03/24/2015       Estimated Creatinine Clearance: 35.3 mL/min (A) (based on SCr of 2.5 mg/dL (H)).    Accu-Checks  Recent Labs      04/26/18   0801  04/26/18   1237  04/26/18   1603  04/26/18   1718  04/26/18   2207  04/27/18   0208  04/27/18   0746  04/27/18   1401  04/27/18   1750  04/27/18   2104   POCTGLUCOSE  169*  264*  275*  257*  222*  152*  116*  192*  197*  247*       Current Medications and/or Treatments Impacting Glycemic Control  Immunotherapy:  Immunosuppressants         Stop Route Frequency     tacrolimus capsule 3 mg      -- Oral 2 times daily        Steroids:   Hormones     Start     Stop Route Frequency Ordered    04/19/18 0900  predniSONE tablet 5 mg      -- PER G TUBE Daily 04/18/18 1106        Pressors:    Autonomic Drugs     Start     Stop Route Frequency Ordered    04/25/18 0900  midodrine tablet 10 mg      -- Oral 3 times daily 04/25/18 0650    04/13/18 0859  midodrine tablet 10 mg      -- Oral As needed (PRN) 04/13/18 0800        Hyperglycemia/Diabetes Medications: Antihyperglycemics     Start     " Stop Route Frequency Ordered    04/26/18 2000  insulin aspart U-100 pen 0-5 Units      -- SubQ Before meals & nightly PRN 04/26/18 1904    04/26/18 1615  insulin aspart U-100 pen 1-2 Units      -- SubQ 3 times daily with meals 04/26/18 1607    04/11/18 0015  insulin regular (Humulin R) 100 Units in sodium chloride 0.9% 100 mL infusion     Question:  Insulin Rate Adjustment (DO NOT MODIFY ANSWER)  Answer:  [email or web address] [id number]    -- IV Continuous 04/10/18 2313          ASSESSMENT and PLAN    Type 1 diabetes mellitus with stage 3 chronic kidney disease    BG goal 140-180,       Increase transition from 0.9 to 1 u/hr, continue novolog 1-2 units with meals depending on intake (which is about equal to CHO 1:20), low correction, ac/hs/0200    Once TF changed to nocturnal regimen, will plan to switch to MDI in preparation for discharge   I have asked primary team to notify us of TF regimen changes     Pt is T1DM, do not suspend insulin >1 hr   If TF held, decrease insulin rate to 0.4u/hr   (known to have controlled BG on basal needs of lev 5 daily/ 0.2u/hr during this hospitalization)        Discharge Recommendations:  Will need scheduled novolog q4 or regular q6 for TF and will need basal insulin for T1DM           Hypothyroidism due to Hashimoto's thyroiditis     continue iv levothyroxine 75mcg daily while inpt and on continuous TF   Switch to LT4 137mcg daily PO/G tube upon discharge/once TF changed to nocturnal regimen.   Separate LT4 1 hr from TF to ensure proper absorption                     On enteral nutrition     TF at goal May increase insulin needs             Current chronic use of systemic steroids     Can increase insulin requirement          Acute respiratory failure with hypoxia     Per primary  S/p trach.  Practicing with speech valve.  Remains NPO           Palmira Dorsey MD  Endocrinology  [facility], Betzaida Hurtado MD,   have personally taken the history and examined the patient and agree with the resident's note as stated above.

## 2018-04-28 NOTE — PT/OT/SLP PROGRESS
Physical Therapy Treatment    Patient Name:  Lv Crocker   MRN:  [id number]    Recommendations:     Discharge Recommendations:  rehabilitation facility   Discharge Equipment Recommendations:  (TBD, pending progress)   Barriers to discharge: Inaccessible home    Assessment:     Lv Crocker is a 42 y.o. male admitted with a medical diagnosis of Acute respiratory failure with hypoxia.  He presents with the following impairments/functional limitations:  weakness, impaired endurance, impaired self care skills, impaired functional mobilty, impaired balance, decreased coordination, decreased upper extremity function, decreased lower extremity function, decreased ROM, edema, impaired cardiopulmonary response to activity, pain, impaired skin, impaired fine motor . Patient fatigued very easily with min exertion. Posrural control was limited even while sitting at EOB. Patient would benefit from continued P.T. To address deficits.    Rehab Prognosis:  fair; patient would benefit from acute skilled PT services to address these deficits and reach maximum level of function.      Recent Surgery: Procedure(s) (LRB):  INSERTION-CATHETER-PERM-A-CATH (Left)  INSERTION-TUBE-GASTROSTOMY (N/A) 24 Days Post-Op    Plan:     During this hospitalization, patient to be seen 5 x/week to address the above listed problems via gait training, therapeutic activities, therapeutic exercises, neuromuscular re-education  · Plan of Care Expires:  05/06/18   Plan of Care Reviewed with: patient, sibling    Subjective     Communicated with RN prior to session.  Patient found in supine upon PT entry to room, agreeable to treatment.      Chief Complaint: fatigue and weakness  Patient comments/goals: to get better  Pain/Comfort:  · Pain Rating 1: 2/10  · Location - Side 1: Bilateral  · Location - Orientation 1: distal  · Location 1: foot  · Pain Addressed 1: Pre-medicate for activity, Reposition, Distraction  · Pain Rating Post-Intervention  1: 3/10    Patients cultural, spiritual, Holiness conflicts given the current situation: None    Objective:     Patient found with: peripheral IV, central line, PEG Tube, telemetry, tracheostomy     General Precautions: Standard, aspiration, fall   Orthopedic Precautions:N/A   Braces: N/A     Functional Mobility:  · Bed Mobility:     · Rolling Left:  total assistance  · Rolling Right: total assistance  · Scooting: total assistance and of 2 persons  · Supine to Sit: total assistance  · Sit to Supine: total assistance  · Balance: poor to fair in sitting      AM-PAC 6 CLICK MOBILITY  Turning over in bed (including adjusting bedclothes, sheets and blankets)?: 2  Sitting down on and standing up from a chair with arms (e.g., wheelchair, bedside commode, etc.): 2  Moving from lying on back to sitting on the side of the bed?: 2  Moving to and from a bed to a chair (including a wheelchair)?: 2  Need to walk in hospital room?: 1  Climbing 3-5 steps with a railing?: 1  Total Score: 10       Therapeutic Activities and Exercises:   Static sitting balance at EOB x 13 minutes with CGA to min assistance. B LE PROM x 30 reps on all available planes of motion. Patient was repositioned in bed for comfort.    Patient left HOB elevated with all lines intact, call button in reach and brother present..    GOALS:    Physical Therapy Goals        Problem: Physical Therapy Goal    Goal Priority Disciplines Outcome Goal Variances Interventions   Physical Therapy Goal     PT/OT, PT Ongoing (interventions implemented as appropriate)     Description:  Goals to be met by: 18     Patient will increase functional independence with mobility by performin. Supine to sit with Moderate Assistance.  2. Sit to supine with Moderate Assistance.  3. Rolling to Left and Right with Minimal Assistance.   4. Sit to stand transfer with Moderate Assistance.  5. Bed to chair transfer with Maximum Assistance.  6. Gait  x 5 feet with Minimal Assistance.    7.  Pt to perf and be compliant with LE HEP to improve vascularity and mm strength.                            Time Tracking:     PT Received On: 04/28/18  PT Start Time: 1130     PT Stop Time: 1157  PT Total Time (min): 27 min     Billable Minutes: Therapeutic Activity 15 and Therapeutic Exercise 12    Treatment Type: Treatment  PT/PTA: PTA     PTA Visit Number: Gee Hoang, PTA  04/28/2018

## 2018-04-28 NOTE — ASSESSMENT & PLAN NOTE
BG goal 140-180,       Increase transition from 0.9 to 1 u/hr, continue novolog 1-2 units with meals depending on intake (which is about equal to CHO 1:20), low correction, ac/hs/0200    Once TF changed to nocturnal regimen, will plan to switch to MDI in preparation for discharge   I have asked primary team to notify us of TF regimen changes     Pt is T1DM, do not suspend insulin >1 hr   If TF held, decrease insulin rate to 0.4u/hr   (known to have controlled BG on basal needs of lev 5 daily/ 0.2u/hr during this hospitalization)        Discharge Recommendations:  Will need scheduled novolog q4 or regular q6 for TF and will need basal insulin for T1DM

## 2018-04-28 NOTE — ASSESSMENT & PLAN NOTE
- Increased Lexapro to 20mg daily  - Continue seroquel 50 QHS   - Continue .5mg xanax PRN Q8H   - Appreciate psychiatry input.

## 2018-04-28 NOTE — ASSESSMENT & PLAN NOTE
- Appreciate Nephrology recs.   - Continue middorine PRN for pressure support  - HD per nephrology.

## 2018-04-28 NOTE — PROGRESS NOTES
[facility]  Heart Transplant  Progress Note    Patient Name: Lv Crocker  MRN: [id number]  Admission Date: 3/11/2018  Hospital Length of Stay: 47 days  Attending Physician: Behzad Lara Jr.,*  Primary Care Provider: Philippe Mohr MD  Principal Problem:Acute respiratory failure with hypoxia    Subjective:     Interval History: Continues to do well. Remains afebrile. Spoke to me today with speaking valve. Slept okay. Eating and drinking without issues. Cough is much better.     Continuous Infusions:   insulin (HUMAN R) infusion (adults) 0.9 Units/hr (04/27/18 0930)     Scheduled Meds:   sodium chloride 0.9%   Intravenous Once    acetaminophen  999.0148 mg Per G Tube TID    albuterol-ipratropium 2.5mg-0.5mg/3mL  3 mL Nebulization Once    cetirizine  5 mg Per G Tube Daily    chlorhexidine  15 mL Mouth/Throat BID    epoetin jose miguel (PROCRIT) injection  4,000 Units Intravenous Every Mon, Wed, Fri    escitalopram oxalate  20 mg Per G Tube Daily    famotidine  20 mg Per G Tube QHS    guaiFENesin  600 mg Oral BID    heparin (porcine)  5,000 Units Subcutaneous Q12H    insulin aspart U-100  1-2 Units Subcutaneous TIDWM    levothyroxine  75 mcg Intravenous Daily    midodrine  10 mg Oral TID    polyethylene glycol  17 g Oral BID    predniSONE  5 mg Per G Tube Daily    QUEtiapine  50 mg Oral QHS    tacrolimus  3 mg Oral BID     PRN Meds:sodium chloride 0.9%, sodium chloride 0.9%, sodium chloride 0.9%, ALPRAZolam, dextromethorphan-guaifenesin  mg/5 ml, dextrose 50%, dextrose 50%, glucagon (human recombinant), glucose, glucose, heparin (porcine), insulin aspart U-100, midodrine, ondansetron, oxyCODONE, povidone-iodine, ramelteon    Review of patient's allergies indicates:   Allergen Reactions    No known drug allergies      Objective:     Vital Signs (Most Recent):  Temp: 98.3 °F (36.8 °C) (04/28/18 0354)  Pulse: (!) 114 (04/28/18 0912)  Resp: 18 (04/28/18 0354)  BP: (!)  106/52 (04/28/18 0354)  SpO2: 97 % (04/28/18 0912) Vital Signs (24h Range):  Temp:  [97.4 °F (36.3 °C)-98.6 °F (37 °C)] 98.3 °F (36.8 °C)  Pulse:  [104-114] 114  Resp:  [16-20] 18  SpO2:  [93 %-98 %] 97 %  BP: ()/(52-58) 106/52     Patient Vitals for the past 72 hrs (Last 3 readings):   Weight   04/27/18 0630 75 kg (165 lb 5.5 oz)   04/26/18 0545 74.9 kg (165 lb 2 oz)     Body mass index is 25.9 kg/m².      Intake/Output Summary (Last 24 hours) at 04/28/18 1026  Last data filed at 04/27/18 1700   Gross per 24 hour   Intake          1765.57 ml   Output             1200 ml   Net           565.57 ml     Hemodynamic Parameters:    Physical Exam   Constitutional: NAD. Appears deconditioned   Neck: No JVD present. Trach appears C/D/I   Cardiovascular: Normal rate, regular rhythm and normal heart sounds.    Pulmonary/Chest: Effort normal and breath sounds decreased at RLB, no wheezes noted.   Abdominal: Soft. Non tender. Non distended. Bowel sounds are normal.   Musculoskeletal: Normal range of motion. He exhibits no edema or tenderness.   Neurological: AAOx3. No focal deficits. Very deconditioned.   Skin: Skin is warm and dry.   Nursing note and vitals reviewed.    Significant Labs:  CBC:    Recent Labs  Lab 04/26/18  0430 04/27/18  0432 04/28/18  0500   WBC 4.51 4.85 4.09   RBC 2.41* 2.50* 2.39*   HGB 7.6* 7.8* 7.5*   HCT 24.8* 25.4* 24.7*    263 250   * 102* 103*   MCH 31.5* 31.2* 31.4*   MCHC 30.6* 30.7* 30.4*     BNP:  No results for input(s): BNP in the last 168 hours.    Invalid input(s): BNPTRIAGELBLO  CMP:    Recent Labs  Lab 04/26/18  0430 04/27/18  0432 04/28/18  0500   * 133* 207*   CALCIUM 8.8 10.0 9.2   ALBUMIN 2.4* 2.4* 2.2*   PROT 6.0 6.3 6.0   * 131* 131*   K 3.7 4.3 4.2   CO2 21* 20* 23   CL 97 98 97   BUN 30* 44* 30*   CREATININE 2.2* 3.0* 2.5*   ALKPHOS 199* 191* 187*   ALT 9* 12 11   AST 15 17 15   BILITOT 0.4 0.4 0.5      Coagulation:   No results for input(s): PT,  INR, APTT in the last 168 hours.  LDH:  No results for input(s): LDH in the last 72 hours.  Microbiology:  Microbiology Results (last 7 days)     Procedure Component Value Units Date/Time    Respiratory Viral Panel by PCR [facility]; Nasal Swab [[id number]] Collected:  04/26/18 1123    Order Status:  Completed Specimen:  Respiratory Updated:  04/27/18 1110     Respiratory Virus Panel, source Nasal Swab     RVP - Adenovirus Not Detected     Comment: Detects Serotypes B and E. Detection of Serotype C may   be limited. If Adenovirus infection is suspected and a   Not Detected result is returned the sample should be   re-tested for Adenovirus using an independent method  (e.g. [employer] Adenovirus Quantitative Real-Time  PCR test.          Enterovirus Not Detected     Comment: Cross-reactivity has been observed between certain Rhinovirus  strains and the Enterovirus assay.          Human Bocavirus Not Detected     Human Coronavirus Not Detected     Comment: The Human Coronavirus assay detects Human coronavirus types  229E, OC43,NL63 and HKU1.          RVP - Human Metapneumovirus (hMPV) Not Detected     RVP - Influenza A Not Detected     Influenza A - I1X4-82 Not Detected     RVP - Influenza B Not Detected     Parainfluenza Not Detected     Respiratory Syncytial VirusVirus (RSV) A Not Detected     Comment: The Respiratory Syncytial Viral assay detects types A and B,  however it does not distinguish between the two.          RVP - Rhinovirus Not Detected     Comment: Cross-Reactivity has been observed between certain   Rhinovirus strains and the Enterovirus assay.  Target Enriched Mulitplex Polymerase Chain Reaction (TEM-PCR)  allows for the detection of multiple pathogens out of a single  reaction.  This test was developed and its performance   characteristics determined by [employer].  It has not   been cleared or approved by the U.S.Food and Drug Administration.  Results should be used in conjunction with  clinical findings,   and should not form the sole basis for a diagnosis or treatment  decision.  TEM-PCR is a licensed technology of [employer].         Narrative:       Receiving Lab:->[facility]    Culture, Respiratory with Gram Stain [[id number]] Collected:  04/26/18 1329    Order Status:  Completed Specimen:  Respiratory from Tracheal Aspirate Updated:  04/27/18 0127     Gram Stain (Respiratory) <10 epithelial cells per low power field.     Gram Stain (Respiratory) No WBC's     Gram Stain (Respiratory) Rare Gram negative rods    AFB Culture & Smear [[id number]] Collected:  03/14/18 1137    Order Status:  Completed Specimen:  Bronchial Wash from Amniotic Fluid Updated:  04/26/18 0927     AFB Culture & Smear Culture in progress     AFB Culture & Smear Culture in progress     AFB CULTURE STAIN No acid fast bacilli seen.        I have reviewed all pertinent labs within the past 24 hours.    Estimated Creatinine Clearance: 35.3 mL/min (A) (based on SCr of 2.5 mg/dL (H)).      Assessment and Plan:     * Acute respiratory failure with hypoxia    - Resolved. S/P Bronch and intubation 3/14 now post tracheostomy due to inability to extubate  - Etiology = RSV   - Pulmonology signed off. Completely weaned off vent.   - Appreciate PT/OT/Wound care.  - Repeat work up for increased cough and possible LLL infiltrate has been negative. No plan for ABX for now.         Heart transplanted    - TTE 4/24/18 showed normal graft function but has known history of restrictive CM post transplant.  - Continue Prednisone 5mg daily and Tacrolimus 3/3 with plan to adjust as needed for goal 6-10  - Tacro level pending this AM   - Cellcept remains on hold due to leukopenia        Type 1 diabetes mellitus with stage 3 chronic kidney disease    - Appreciate Endocrine's recs  - Insulin gtt per endocrine recs. Adjust as needed  - per endocrine please call when he is off tube feeds to adjust ggt and transition to rehab/outpatient  regimen        Hypothyroidism due to Hashimoto's thyroiditis    - Appreciate Endocrine's recs  - Continue Levothyroxine 75mcg IV daily        Gastroparesis    - abdominal pain improved from weekend  - BM x4 on 4/25, passing gas  - KUB without evidence of bowel obstructionunclear if  worsening gastroparesis or new partial obstruction  - Per GI recs patient should tolerate up to 400 mL residuals. Will wean narcotics. Can complete CT AP if needed however pain is mild and improving at this point  - CMV PCR undetected        Alteration in skin integrity    - wound care following        Restrictive cardiomyopathy    - No changes (see above)         Acute renal failure with acute tubular necrosis superimposed on stage 3 chronic kidney disease    - Appreciate Nephrology recs.   - Continue middorine PRN for pressure support  - HD per nephrology.         Anemia    - Last transfusion 4/11  - Hgb stable but low   - Nephrology has resumed ProCrit         Debility    - PT/OT/SLP daily. Recommending rehab.  - Nutrition following.   - Continue tube feeds. Novasource renal   - Passed MBSS with speech. Started regular diet with thin liquids, but will continue tube feeds at lower rate (30).         Anxiety    - Increased Lexapro to 20mg daily  - Continue seroquel 50 QHS   - Continue .5mg xanax PRN Q8H   - Appreciate psychiatry input.             Mamadou rhodes, DO  Heart Transplant  [facility]

## 2018-04-28 NOTE — SUBJECTIVE & OBJECTIVE
"Interval HPI:   Overnight events:  Trach in place    tube feedings infusing at 30cc/hr with BG trending up this morning on transition 0.9u/hr BG at or above goal, no hypoglcyemia  Passed swallow, now on dysphagia/pureed diet, only ate breakfast yesterday  Remains on CRRT  On PDN 5mg daily   Remains IV LT4 75mcg daily    BP (!) 106/52 (BP Location: Right arm, Patient Position: Lying)   Pulse 108   Temp 98.3 °F (36.8 °C) (Oral)   Resp 18   Ht 5' 7" (1.702 m)   Wt 75 kg (165 lb 5.5 oz)   SpO2 97%   BMI 25.90 kg/m²     Labs Reviewed and Include      Recent Labs  Lab 04/28/18  0500   *   CALCIUM 9.2   ALBUMIN 2.2*   PROT 6.0   *   K 4.2   CO2 23   CL 97   BUN 30*   CREATININE 2.5*   ALKPHOS 187*   ALT 11   AST 15   BILITOT 0.5     Lab Results   Component Value Date    WBC 4.09 04/28/2018    HGB 7.5 (L) 04/28/2018    HCT 24.7 (L) 04/28/2018     (H) 04/28/2018     04/28/2018     No results for input(s): TSH, FREET4 in the last 168 hours.  Lab Results   Component Value Date    HGBA1C 5.1 04/05/2018       Nutritional status:   Body mass index is 25.9 kg/m².  Lab Results   Component Value Date    ALBUMIN 2.2 (L) 04/28/2018    ALBUMIN 2.4 (L) 04/27/2018    ALBUMIN 2.4 (L) 04/26/2018     Lab Results   Component Value Date    PREALBUMIN 13 (L) 04/08/2018    PREALBUMIN 5 (L) 03/15/2018    PREALBUMIN 18 (L) 03/24/2015       Estimated Creatinine Clearance: 35.3 mL/min (A) (based on SCr of 2.5 mg/dL (H)).    Accu-Checks  Recent Labs      04/26/18   0801  04/26/18   1237  04/26/18   1603  04/26/18   1718  04/26/18   2207  04/27/18   0208  04/27/18   0746  04/27/18   1401  04/27/18   1750  04/27/18   2104   POCTGLUCOSE  169*  264*  275*  257*  222*  152*  116*  192*  197*  247*       Current Medications and/or Treatments Impacting Glycemic Control  Immunotherapy:  Immunosuppressants         Stop Route Frequency     tacrolimus capsule 3 mg      -- Oral 2 times daily        Steroids:   Hormones     " Start     Stop Route Frequency Ordered    04/19/18 0900  predniSONE tablet 5 mg      -- PER G TUBE Daily 04/18/18 1106        Pressors:    Autonomic Drugs     Start     Stop Route Frequency Ordered    04/25/18 0900  midodrine tablet 10 mg      -- Oral 3 times daily 04/25/18 0650    04/13/18 0859  midodrine tablet 10 mg      -- Oral As needed (PRN) 04/13/18 0800        Hyperglycemia/Diabetes Medications: Antihyperglycemics     Start     Stop Route Frequency Ordered    04/26/18 2000  insulin aspart U-100 pen 0-5 Units      -- SubQ Before meals & nightly PRN 04/26/18 1904    04/26/18 1615  insulin aspart U-100 pen 1-2 Units      -- SubQ 3 times daily with meals 04/26/18 1607    04/11/18 0015  insulin regular (Humulin R) 100 Units in sodium chloride 0.9% 100 mL infusion     Question:  Insulin Rate Adjustment (DO NOT MODIFY ANSWER)  Answer:  [email or web address]    -- IV Continuous 04/10/18 231

## 2018-04-28 NOTE — PLAN OF CARE
Problem: Physical Therapy Goal  Goal: Physical Therapy Goal  Goals to be met by: 18     Patient will increase functional independence with mobility by performin. Supine to sit with Moderate Assistance.  2. Sit to supine with Moderate Assistance.  3. Rolling to Left and Right with Minimal Assistance.   4. Sit to stand transfer with Moderate Assistance.  5. Bed to chair transfer with Maximum Assistance.  6. Gait  x 5 feet with Minimal Assistance.   7.  Pt to perf and be compliant with LE HEP to improve vascularity and mm strength.           Outcome: Ongoing (interventions implemented as appropriate)  Functional mobility continues to be limited.

## 2018-04-28 NOTE — ASSESSMENT & PLAN NOTE
- Resolved. S/P Bronch and intubation 3/14 now post tracheostomy due to inability to extubate  - Etiology = RSV   - Pulmonology signed off. Completely weaned off vent.   - Appreciate PT/OT/Wound care.  - Repeat work up for increased cough and possible LLL infiltrate has been negative. No plan for ABX for now.

## 2018-04-28 NOTE — ASSESSMENT & PLAN NOTE
- PT/OT/SLP daily. Recommending rehab.  - Nutrition following.   - Continue tube feeds. Novasource renal   - Passed MBSS with speech. Started regular diet with thin liquids, but will continue tube feeds at lower rate (30).

## 2018-04-28 NOTE — ASSESSMENT & PLAN NOTE
- TTE 4/24/18 showed normal graft function but has known history of restrictive CM post transplant.  - Continue Prednisone 5mg daily and Tacrolimus 3/3 with plan to adjust as needed for goal 6-10  - Tacro level pending this AM   - Cellcept remains on hold due to leukopenia

## 2018-04-29 NOTE — ASSESSMENT & PLAN NOTE
- Resolved. S/P Bronch and intubation 3/14 now post tracheostomy due to inability to extubate  - Etiology = RSV   - Pulmonology signed off. Completely weaned off vent.   - Appreciate PT/OT/Wound care.

## 2018-04-29 NOTE — SUBJECTIVE & OBJECTIVE
"Interval HPI:   Overnight events:  Prandial excursions noted  Ate 3 meals yesterday, all >25%  Remains on continuous TF 30cc/hr  On PDN 5mg daily   On IV Lt4  Getting CRRT    BP (!) 99/59 (BP Location: Right arm, Patient Position: Lying)   Pulse 108   Temp 98.6 °F (37 °C) (Oral)   Resp 20   Ht 5' 7" (1.702 m)   Wt 75 kg (165 lb 5.5 oz)   SpO2 95%   BMI 25.90 kg/m²     Labs Reviewed and Include      Recent Labs  Lab 04/29/18  0708   *   CALCIUM 10.1   ALBUMIN 2.3*   PROT 6.5   *   K 5.1   CO2 21*   CL 94*   BUN 44*   CREATININE 3.5*   ALKPHOS 202*   ALT 12   AST 14   BILITOT 0.4     Lab Results   Component Value Date    WBC 3.81 (L) 04/29/2018    HGB 7.4 (L) 04/29/2018    HCT 24.4 (L) 04/29/2018     (H) 04/29/2018     04/29/2018     No results for input(s): TSH, FREET4 in the last 168 hours.  Lab Results   Component Value Date    HGBA1C 5.1 04/05/2018       Nutritional status:   Body mass index is 25.9 kg/m².  Lab Results   Component Value Date    ALBUMIN 2.3 (L) 04/29/2018    ALBUMIN 2.2 (L) 04/28/2018    ALBUMIN 2.4 (L) 04/27/2018     Lab Results   Component Value Date    PREALBUMIN 13 (L) 04/08/2018    PREALBUMIN 5 (L) 03/15/2018    PREALBUMIN 18 (L) 03/24/2015       Estimated Creatinine Clearance: 25.2 mL/min (A) (based on SCr of 3.5 mg/dL (H)).    Accu-Checks  Recent Labs      04/26/18   1718  04/26/18   2207  04/27/18   0208  04/27/18   0746  04/27/18   1401  04/27/18   1750  04/27/18   2104  04/28/18   0809  04/28/18   1337  04/28/18   1638   POCTGLUCOSE  257*  222*  152*  116*  192*  197*  247*  223*  202*  268*       Current Medications and/or Treatments Impacting Glycemic Control  Immunotherapy:  Immunosuppressants         Stop Route Frequency     tacrolimus capsule 4 mg      -- Oral 2 times daily        Steroids:   Hormones     Start     Stop Route Frequency Ordered    04/19/18 0900  predniSONE tablet 5 mg      -- PER G TUBE Daily 04/18/18 1106        Pressors:  "   Autonomic Drugs     Start     Stop Route Frequency Ordered    04/25/18 0900  midodrine tablet 10 mg      -- Oral 3 times daily 04/25/18 0650    04/13/18 0859  midodrine tablet 10 mg      -- Oral As needed (PRN) 04/13/18 0800        Hyperglycemia/Diabetes Medications: Antihyperglycemics     Start     Stop Route Frequency Ordered    04/29/18 0830  insulin aspart U-100 pen 4 Units      -- SubQ 3 times daily with meals 04/29/18 0820    04/26/18 2000  insulin aspart U-100 pen 0-5 Units      -- SubQ Before meals & nightly PRN 04/26/18 1904    04/11/18 0015  insulin regular (Humulin R) 100 Units in sodium chloride 0.9% 100 mL infusion     Question:  Insulin Rate Adjustment (DO NOT MODIFY ANSWER)  Answer:  [email or web address] [id number]    -- IV Continuous 04/10/18 8381

## 2018-04-29 NOTE — ASSESSMENT & PLAN NOTE
- PT/OT/SLP daily. Recommending rehab.  - Nutrition following.   - Passed MBSS with speech. Started regular diet with thin liquids, but will continue tube feeds at lower rate (30).

## 2018-04-29 NOTE — SUBJECTIVE & OBJECTIVE
Interval History: Slept much better overnight. No acute complaints this AM. Was able to work with PT yesterday. Still trying to figure out a good place for rehab.     Continuous Infusions:   insulin (HUMAN R) infusion (adults) 1 Units/hr (04/28/18 0802)     Scheduled Meds:   sodium chloride 0.9%   Intravenous Once    acetaminophen  999.0148 mg Per G Tube TID    cetirizine  5 mg Per G Tube Daily    chlorhexidine  15 mL Mouth/Throat BID    epoetin jose miguel (PROCRIT) injection  4,000 Units Intravenous Every Mon, Wed, Fri    escitalopram oxalate  20 mg Per G Tube Daily    famotidine  20 mg Per G Tube QHS    guaiFENesin  600 mg Oral BID    heparin (porcine)  5,000 Units Subcutaneous Q12H    insulin aspart U-100  4 Units Subcutaneous TIDWM    levothyroxine  75 mcg Per G Tube Before breakfast    midodrine  10 mg Oral TID    polyethylene glycol  17 g Oral BID    predniSONE  5 mg Per G Tube Daily    QUEtiapine  50 mg Oral QHS    tacrolimus  4 mg Oral BID     PRN Meds:sodium chloride 0.9%, sodium chloride 0.9%, sodium chloride 0.9%, albuterol-ipratropium 2.5mg-0.5mg/3mL, ALPRAZolam, dextromethorphan-guaifenesin  mg/5 ml, dextrose 50%, dextrose 50%, glucagon (human recombinant), glucose, glucose, heparin (porcine), insulin aspart U-100, midodrine, ondansetron, oxyCODONE, povidone-iodine, ramelteon    Review of patient's allergies indicates:   Allergen Reactions    No known drug allergies      Objective:     Vital Signs (Most Recent):  Temp: 98.6 °F (37 °C) (04/29/18 0511)  Pulse: (!) 114 (04/29/18 0934)  Resp: 18 (04/29/18 0934)  BP: (!) 99/59 (04/29/18 0511)  SpO2: 96 % (04/29/18 0934) Vital Signs (24h Range):  Temp:  [98.3 °F (36.8 °C)-98.7 °F (37.1 °C)] 98.6 °F (37 °C)  Pulse:  [104-118] 114  Resp:  [18-20] 18  SpO2:  [95 %-98 %] 96 %  BP: ()/(50-67) 99/59     Patient Vitals for the past 72 hrs (Last 3 readings):   Weight   04/27/18 0630 75 kg (165 lb 5.5 oz)     Body mass index is 25.9  kg/m².      Intake/Output Summary (Last 24 hours) at 04/29/18 0954  Last data filed at 04/28/18 1400   Gross per 24 hour   Intake           1562.2 ml   Output                0 ml   Net           1562.2 ml     Hemodynamic Parameters:  Physical Exam  Constitutional: NAD. Appears deconditioned   Neck: No JVD present. Trach appears C/D/I   Cardiovascular: Normal rate, regular rhythm and normal heart sounds.    Pulmonary/Chest: Effort normal and breath sounds decreased at RLB, no wheezes noted.   Abdominal: Soft. Non tender. Non distended. Bowel sounds are normal.   Musculoskeletal: Normal range of motion. He exhibits no edema or tenderness.   Neurological: AAOx3. No focal deficits. Very deconditioned.   Skin: Skin is warm and dry.   Nursing note and vitals reviewed.    Significant Labs:  CBC:    Recent Labs  Lab 04/27/18  0432 04/28/18  0500 04/29/18  0708   WBC 4.85 4.09 3.81*   RBC 2.50* 2.39* 2.36*   HGB 7.8* 7.5* 7.4*   HCT 25.4* 24.7* 24.4*    250 284   * 103* 103*   MCH 31.2* 31.4* 31.4*   MCHC 30.7* 30.4* 30.3*     CMP:    Recent Labs  Lab 04/27/18  0432 04/28/18  0500 04/29/18  0708   * 207* 321*   CALCIUM 10.0 9.2 10.1   ALBUMIN 2.4* 2.2* 2.3*   PROT 6.3 6.0 6.5   * 131* 130*   K 4.3 4.2 5.1   CO2 20* 23 21*   CL 98 97 94*   BUN 44* 30* 44*   CREATININE 3.0* 2.5* 3.5*   ALKPHOS 191* 187* 202*   ALT 12 11 12   AST 17 15 14   BILITOT 0.4 0.5 0.4      Microbiology:  Microbiology Results (last 7 days)     Procedure Component Value Units Date/Time    Culture, Respiratory with Gram Stain [[id number]] Collected:  04/26/18 1329    Order Status:  Completed Specimen:  Respiratory from Tracheal Aspirate Updated:  04/28/18 1151     Respiratory Culture Normal respiratory hank     Gram Stain (Respiratory) <10 epithelial cells per low power field.     Gram Stain (Respiratory) No WBC's     Gram Stain (Respiratory) Rare Gram negative rods    Respiratory Viral Panel by PCR [facility]; Nasal Swab  [[id number]] Collected:  04/26/18 1123    Order Status:  Completed Specimen:  Respiratory Updated:  04/27/18 1110     Respiratory Virus Panel, source Nasal Swab     RVP - Adenovirus Not Detected     Comment: Detects Serotypes B and E. Detection of Serotype C may   be limited. If Adenovirus infection is suspected and a   Not Detected result is returned the sample should be   re-tested for Adenovirus using an independent method  (e.g. [employer] Adenovirus Quantitative Real-Time  PCR test.          Enterovirus Not Detected     Comment: Cross-reactivity has been observed between certain Rhinovirus  strains and the Enterovirus assay.          Human Bocavirus Not Detected     Human Coronavirus Not Detected     Comment: The Human Coronavirus assay detects Human coronavirus types  229E, OC43,NL63 and HKU1.          RVP - Human Metapneumovirus (hMPV) Not Detected     RVP - Influenza A Not Detected     Influenza A - [id number] Not Detected     RVP - Influenza B Not Detected     Parainfluenza Not Detected     Respiratory Syncytial VirusVirus (RSV) A Not Detected     Comment: The Respiratory Syncytial Viral assay detects types A and B,  however it does not distinguish between the two.          RVP - Rhinovirus Not Detected     Comment: Cross-Reactivity has been observed between certain   Rhinovirus strains and the Enterovirus assay.  Target Enriched Mulitplex Polymerase Chain Reaction (TEM-PCR)  allows for the detection of multiple pathogens out of a single  reaction.  This test was developed and its performance   characteristics determined by [employer].  It has not   been cleared or approved by the U.S.Food and Drug Administration.  Results should be used in conjunction with clinical findings,   and should not form the sole basis for a diagnosis or treatment  decision.  TEM-PCR is a licensed technology of [employer].         Narrative:       Receiving Lab:->[facility]    AFB Culture & Smear [[id number]]  Collected:  03/14/18 1137    Order Status:  Completed Specimen:  Bronchial Wash from Amniotic Fluid Updated:  04/26/18 0927     AFB Culture & Smear Culture in progress     AFB Culture & Smear Culture in progress     AFB CULTURE STAIN No acid fast bacilli seen.        I have reviewed all pertinent labs within the past 24 hours.    Estimated Creatinine Clearance: 25.2 mL/min (A) (based on SCr of 3.5 mg/dL (H)).    Physical Exam

## 2018-04-29 NOTE — ASSESSMENT & PLAN NOTE
- TTE 4/24/18 showed normal graft function but has known history of restrictive CM post transplant.  - Continue Prednisone 5mg daily and increase Tacrolimus to 4/4 with plan to adjust as needed for goal 6-10  - Tacro level pending this AM   - Cellcept remains on hold due to leukopenia

## 2018-04-29 NOTE — PROGRESS NOTES
[facility]  Heart Transplant  Progress Note    Patient Name: Lv Crocker  MRN: [id number]  Admission Date: 3/11/2018  Hospital Length of Stay: 48 days  Attending Physician: Alexandr Hernández MD  Primary Care Provider: Philippe Mohr MD  Principal Problem:Acute respiratory failure with hypoxia    Subjective:     Interval History: Slept much better overnight. No acute complaints this AM. Was able to work with PT yesterday. Still trying to figure out a good place for rehab.     Continuous Infusions:   insulin (HUMAN R) infusion (adults) 1 Units/hr (04/28/18 0802)     Scheduled Meds:   sodium chloride 0.9%   Intravenous Once    acetaminophen  999.0148 mg Per G Tube TID    cetirizine  5 mg Per G Tube Daily    chlorhexidine  15 mL Mouth/Throat BID    epoetin jose miguel (PROCRIT) injection  4,000 Units Intravenous Every Mon, Wed, Fri    escitalopram oxalate  20 mg Per G Tube Daily    famotidine  20 mg Per G Tube QHS    guaiFENesin  600 mg Oral BID    heparin (porcine)  5,000 Units Subcutaneous Q12H    insulin aspart U-100  4 Units Subcutaneous TIDWM    levothyroxine  75 mcg Per G Tube Before breakfast    midodrine  10 mg Oral TID    polyethylene glycol  17 g Oral BID    predniSONE  5 mg Per G Tube Daily    QUEtiapine  50 mg Oral QHS    tacrolimus  4 mg Oral BID     PRN Meds:sodium chloride 0.9%, sodium chloride 0.9%, sodium chloride 0.9%, albuterol-ipratropium 2.5mg-0.5mg/3mL, ALPRAZolam, dextromethorphan-guaifenesin  mg/5 ml, dextrose 50%, dextrose 50%, glucagon (human recombinant), glucose, glucose, heparin (porcine), insulin aspart U-100, midodrine, ondansetron, oxyCODONE, povidone-iodine, ramelteon    Review of patient's allergies indicates:   Allergen Reactions    No known drug allergies      Objective:     Vital Signs (Most Recent):  Temp: 98.6 °F (37 °C) (04/29/18 0511)  Pulse: (!) 114 (04/29/18 0934)  Resp: 18 (04/29/18 0934)  BP: (!) 99/59 (04/29/18 0511)  SpO2: 96 %  (04/29/18 0934) Vital Signs (24h Range):  Temp:  [98.3 °F (36.8 °C)-98.7 °F (37.1 °C)] 98.6 °F (37 °C)  Pulse:  [104-118] 114  Resp:  [18-20] 18  SpO2:  [95 %-98 %] 96 %  BP: ()/(50-67) 99/59     Patient Vitals for the past 72 hrs (Last 3 readings):   Weight   04/27/18 0630 75 kg (165 lb 5.5 oz)     Body mass index is 25.9 kg/m².      Intake/Output Summary (Last 24 hours) at 04/29/18 0954  Last data filed at 04/28/18 1400   Gross per 24 hour   Intake           1562.2 ml   Output                0 ml   Net           1562.2 ml     Hemodynamic Parameters:  Physical Exam  Constitutional: NAD. Appears deconditioned   Neck: No JVD present. Trach appears C/D/I   Cardiovascular: Normal rate, regular rhythm and normal heart sounds.    Pulmonary/Chest: Effort normal and breath sounds decreased at RLB, no wheezes noted.   Abdominal: Soft. Non tender. Non distended. Bowel sounds are normal.   Musculoskeletal: Normal range of motion. He exhibits no edema or tenderness.   Neurological: AAOx3. No focal deficits. Very deconditioned.   Skin: Skin is warm and dry.   Nursing note and vitals reviewed.    Significant Labs:  CBC:    Recent Labs  Lab 04/27/18  0432 04/28/18  0500 04/29/18  0708   WBC 4.85 4.09 3.81*   RBC 2.50* 2.39* 2.36*   HGB 7.8* 7.5* 7.4*   HCT 25.4* 24.7* 24.4*    250 284   * 103* 103*   MCH 31.2* 31.4* 31.4*   MCHC 30.7* 30.4* 30.3*     CMP:    Recent Labs  Lab 04/27/18  0432 04/28/18  0500 04/29/18  0708   * 207* 321*   CALCIUM 10.0 9.2 10.1   ALBUMIN 2.4* 2.2* 2.3*   PROT 6.3 6.0 6.5   * 131* 130*   K 4.3 4.2 5.1   CO2 20* 23 21*   CL 98 97 94*   BUN 44* 30* 44*   CREATININE 3.0* 2.5* 3.5*   ALKPHOS 191* 187* 202*   ALT 12 11 12   AST 17 15 14   BILITOT 0.4 0.5 0.4      Microbiology:  Microbiology Results (last 7 days)     Procedure Component Value Units Date/Time    Culture, Respiratory with Gram Stain [[id number]] Collected:  04/26/18 1329    Order Status:  Completed Specimen:   Respiratory from Tracheal Aspirate Updated:  04/28/18 1151     Respiratory Culture Normal respiratory hank     Gram Stain (Respiratory) <10 epithelial cells per low power field.     Gram Stain (Respiratory) No WBC's     Gram Stain (Respiratory) Rare Gram negative rods    Respiratory Viral Panel by PCR [facility]; Nasal Swab [[id number]] Collected:  04/26/18 1123    Order Status:  Completed Specimen:  Respiratory Updated:  04/27/18 1110     Respiratory Virus Panel, source Nasal Swab     RVP - Adenovirus Not Detected     Comment: Detects Serotypes B and E. Detection of Serotype C may   be limited. If Adenovirus infection is suspected and a   Not Detected result is returned the sample should be   re-tested for Adenovirus using an independent method  (e.g. [employer] Adenovirus Quantitative Real-Time  PCR test.          Enterovirus Not Detected     Comment: Cross-reactivity has been observed between certain Rhinovirus  strains and the Enterovirus assay.          Human Bocavirus Not Detected     Human Coronavirus Not Detected     Comment: The Human Coronavirus assay detects Human coronavirus types  229E, OC43,NL63 and HKU1.          RVP - Human Metapneumovirus (hMPV) Not Detected     RVP - Influenza A Not Detected     Influenza A - L9A6-01 Not Detected     RVP - Influenza B Not Detected     Parainfluenza Not Detected     Respiratory Syncytial VirusVirus (RSV) A Not Detected     Comment: The Respiratory Syncytial Viral assay detects types A and B,  however it does not distinguish between the two.          RVP - Rhinovirus Not Detected     Comment: Cross-Reactivity has been observed between certain   Rhinovirus strains and the Enterovirus assay.  Target Enriched Mulitplex Polymerase Chain Reaction (TEM-PCR)  allows for the detection of multiple pathogens out of a single  reaction.  This test was developed and its performance   characteristics determined by [employer].  It has not   been cleared or approved by the  U.S.Food and Drug Administration.  Results should be used in conjunction with clinical findings,   and should not form the sole basis for a diagnosis or treatment  decision.  TEM-PCR is a licensed technology of [employer].         Narrative:       Receiving Lab:->[facility]    AFB Culture & Smear [[id number]] Collected:  03/14/18 1137    Order Status:  Completed Specimen:  Bronchial Wash from Amniotic Fluid Updated:  04/26/18 0927     AFB Culture & Smear Culture in progress     AFB Culture & Smear Culture in progress     AFB CULTURE STAIN No acid fast bacilli seen.        I have reviewed all pertinent labs within the past 24 hours.    Estimated Creatinine Clearance: 25.2 mL/min (A) (based on SCr of 3.5 mg/dL (H)).    Physical Exam      Assessment and Plan:     * Acute respiratory failure with hypoxia    - Resolved. S/P Bronch and intubation 3/14 now post tracheostomy due to inability to extubate  - Etiology = RSV   - Pulmonology signed off. Completely weaned off vent.   - Appreciate PT/OT/Wound care.        Heart transplanted    - TTE 4/24/18 showed normal graft function but has known history of restrictive CM post transplant.  - Continue Prednisone 5mg daily and increase Tacrolimus to 4/4 with plan to adjust as needed for goal 6-10  - Tacro level pending this AM   - Cellcept remains on hold due to leukopenia        Type 1 diabetes mellitus with stage 3 chronic kidney disease    - Appreciate Endocrine's recs  - Insulin gtt per endocrine recs. Adjust as needed  - per endocrine please call when he is off tube feeds to adjust ggt and transition to rehab/outpatient regimen        Hypothyroidism due to Hashimoto's thyroiditis    - Appreciate Endocrine's recs  - Switched to PO Levothyroxine 75mcg daily        Gastroparesis    - abdominal pain improved from weekend  - BM x4 on 4/25, passing gas  - KUB without evidence of bowel obstructionunclear if  worsening gastroparesis or new partial obstruction  - Per GI  recs patient should tolerate up to 400 mL residuals. Will wean narcotics. Can complete CT AP if needed however pain is mild and improving at this point  - CMV PCR undetected        Alteration in skin integrity    - wound care following        Restrictive cardiomyopathy    - No changes (see above)         Acute renal failure with acute tubular necrosis superimposed on stage 3 chronic kidney disease    - Appreciate Nephrology recs.   - Continue middorine  - HD per nephrology.         Anemia    - Last transfusion 4/11  - Hgb stable but low   - Nephrology has resumed ProCrit         Debility    - PT/OT/SLP daily. Recommending rehab.  - Nutrition following.   - Passed MBSS with speech. Started regular diet with thin liquids, but will continue tube feeds at lower rate (30).         Anxiety    - Increased Lexapro to 20mg daily  - Continue seroquel 50 QHS   - Continue .5mg xanax PRN Q8H   - Appreciate psychiatry input.           Mamadou rhodes, DO  Heart Transplant  [facility]

## 2018-04-29 NOTE — ASSESSMENT & PLAN NOTE
BG goal 140-180,       Continue transition gtt at 1 u/hr, increase novolog from 2 to 4 units with meals, low correction, ac/hs/0200    Once TF changed to nocturnal regimen, will plan to switch to MDI in preparation for discharge   I have asked primary team to notify us of TF regimen changes     Pt is T1DM, do not suspend insulin >1 hr   If TF held, decrease insulin rate to 0.4u/hr   (known to have controlled BG on basal needs of lev 5 daily/ 0.2u/hr during this hospitalization)        Discharge Recommendations:  Will need scheduled novolog q4 or regular q6 for TF and will need basal insulin for T1DM

## 2018-04-29 NOTE — PROGRESS NOTES
"[facility]  Endocrinology  Progress Note    Admit Date: 3/11/2018     Reason for Consult: abnormal TFTs    Surgical Procedure and Date: s/p heart transplant 2014     Diabetes diagnosis year: 7yo (T1DM)     Home Diabetes Medications:    Levemir 15u qHS  Novolog 4u AC     How often checking glucose at home? 4 times daily   BG readings on regimen: 150-200s  Hypoglycemia on the regimen?  Yes, rarely - treats appropriately (light snack, glucose tab)  Missed doses on regimen?  No        Diabetes Complications include:     Hyperglycemia, Hypoglycemia  and Diabetic chronic kidney disease          Complicating diabetes co morbidities:   N/A     HPI:   Patient is a 42 y.o. male with a diagnosis of T1DM, HTN, hypothyroidism, s/p heart transplant.  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 He required serial paracentesis and thoracentesis until he underwent a VATS with pleurX catheter placement on 1/11/2018 and removed 2/7/18.       Presented to hospital secondary to diarrhea and cough.  Upon initial labs, TSH was decreased (0.101) and free T4 1.33.  Endocrinology consulted to assist in management of these labs.  He was last seen in clinic by Kamala Vázquez NP 11/2017.   Previous hospitalization, endocrine consulted for same problem.  During that visit, recommended decreasing LT4 to 125mcg daily. Unfortunately, patient was discharged on previous dose of 150mcg daily.     Interval HPI:   Overnight events:  Prandial excursions noted  Ate 3 meals yesterday, all >25%  Remains on continuous TF 30cc/hr  On PDN 5mg daily   On IV Lt4  Getting CRRT    BP (!) 99/59 (BP Location: Right arm, Patient Position: Lying)   Pulse 108   Temp 98.6 °F (37 °C) (Oral)   Resp 20   Ht 5' 7" (1.702 m)   Wt 75 kg (165 lb 5.5 oz)   SpO2 95%   BMI 25.90 kg/m²       Labs Reviewed and Include      Recent Labs  Lab " 04/29/18  0708   *   CALCIUM 10.1   ALBUMIN 2.3*   PROT 6.5   *   K 5.1   CO2 21*   CL 94*   BUN 44*   CREATININE 3.5*   ALKPHOS 202*   ALT 12   AST 14   BILITOT 0.4     Lab Results   Component Value Date    WBC 3.81 (L) 04/29/2018    HGB 7.4 (L) 04/29/2018    HCT 24.4 (L) 04/29/2018     (H) 04/29/2018     04/29/2018     No results for input(s): TSH, FREET4 in the last 168 hours.  Lab Results   Component Value Date    HGBA1C 5.1 04/05/2018       Nutritional status:   Body mass index is 25.9 kg/m².  Lab Results   Component Value Date    ALBUMIN 2.3 (L) 04/29/2018    ALBUMIN 2.2 (L) 04/28/2018    ALBUMIN 2.4 (L) 04/27/2018     Lab Results   Component Value Date    PREALBUMIN 13 (L) 04/08/2018    PREALBUMIN 5 (L) 03/15/2018    PREALBUMIN 18 (L) 03/24/2015       Estimated Creatinine Clearance: 25.2 mL/min (A) (based on SCr of 3.5 mg/dL (H)).    Accu-Checks  Recent Labs      04/26/18   1718  04/26/18   2207  04/27/18   0208  04/27/18   0746  04/27/18   1401  04/27/18   1750  04/27/18   2104  04/28/18   0809  04/28/18   1337  04/28/18   1638   POCTGLUCOSE  257*  222*  152*  116*  192*  197*  247*  223*  202*  268*       Current Medications and/or Treatments Impacting Glycemic Control  Immunotherapy:  Immunosuppressants         Stop Route Frequency     tacrolimus capsule 4 mg      -- Oral 2 times daily        Steroids:   Hormones     Start     Stop Route Frequency Ordered    04/19/18 0900  predniSONE tablet 5 mg      -- PER G TUBE Daily 04/18/18 1106        Pressors:    Autonomic Drugs     Start     Stop Route Frequency Ordered    04/25/18 0900  midodrine tablet 10 mg      -- Oral 3 times daily 04/25/18 0650    04/13/18 0859  midodrine tablet 10 mg      -- Oral As needed (PRN) 04/13/18 0800        Hyperglycemia/Diabetes Medications: Antihyperglycemics     Start     Stop Route Frequency Ordered    04/29/18 0830  insulin aspart U-100 pen 4 Units      -- SubQ 3 times daily with meals 04/29/18 0820     04/26/18 2000  insulin aspart U-100 pen 0-5 Units      -- SubQ Before meals & nightly PRN 04/26/18 1904    04/11/18 0015  insulin regular (Humulin R) 100 Units in sodium chloride 0.9% 100 mL infusion     Question:  Insulin Rate Adjustment (DO NOT MODIFY ANSWER)  Answer:  \\[email or web address]    -- IV Continuous 04/10/18 2313          ASSESSMENT and PLAN    Type 1 diabetes mellitus with stage 3 chronic kidney disease    BG goal 140-180,       Continue transition gtt at 1 u/hr, increase novolog from 2 to 4 units with meals, low correction, ac/hs/0200    Once TF changed to nocturnal regimen, will plan to switch to MDI in preparation for discharge   I have asked primary team to notify us of TF regimen changes     Pt is T1DM, do not suspend insulin >1 hr   If TF held, decrease insulin rate to 0.4u/hr   (known to have controlled BG on basal needs of lev 5 daily/ 0.2u/hr during this hospitalization)        Discharge Recommendations:  Will need scheduled novolog q4 or regular q6 for TF and will need basal insulin for T1DM           Hypothyroidism due to Hashimoto's thyroiditis     continue iv levothyroxine 75mcg daily while inpt and on continuous TF   Switch to LT4 137mcg daily PO/G tube upon discharge/once TF changed to nocturnal regimen.   Separate LT4 1 hr from TF to ensure proper absorption                       On enteral nutrition     TF at goal May increase insulin needs             Current chronic use of systemic steroids     Can increase insulin requirement          Acute respiratory failure with hypoxia     Per primary  S/p trach.  Practicing with speech valve.         Palmira Dorsey MD  Endocrinology  [facility] GARCIA, Betzaida Hurtado MD,  have personally taken the history and examined the patient and agree with the resident's note as stated above.

## 2018-04-30 NOTE — PROGRESS NOTES
Dialysis complete.  Blood returned.     Left chest wall CVC flushed * 2 with  saline and heparin    Locked with cap and tape.  No s/s infection at cvc site.   Pt tolerated hemodialysis without diff.  Pt ran   3.5 Hrs on hemodialysis machine.   Took off 2500  Ml net volume.      Used F-160 dialyzer.

## 2018-04-30 NOTE — PLAN OF CARE
Problem: Occupational Therapy Goal  Goal: Occupational Therapy Goal  Goals to be met by: 5/8/18    Pt will follow 75% of motor commands with <2 verbal cues for repetition of task to maximize engagement in grooming task.--MET  Pt will complete feeding with mod A.   Pt will complete supine with HOB slightly elevated to seated at EOB with mod A in prep for seated grooming task.  Pt will engage in BUE exercises in orders to increase strength to 3+/5 in BUE's to increase engagement in seated grooming task  Pt will sit at EOB for approx 10 minutes with mod A and unilateral UE support to complete grooming task  Pt will complete sit>stand from EOB with bed in lowest position with mod A in prep for transfer to [facility]      Outcome: Ongoing (interventions implemented as appropriate)  con't with goals  Delia To, LILYR

## 2018-04-30 NOTE — PROGRESS NOTES
[facility]  Heart Transplant  Progress Note    Patient Name: Lv Crocker  MRN: [id number]  Admission Date: 3/11/2018  Hospital Length of Stay: 49 days  Attending Physician: Alexandr Hernández MD  Primary Care Provider: Philippe Mohr MD  Principal Problem:Acute respiratory failure with hypoxia    Subjective:     Interval History: prograf increased yesterday to 4/4, no issues over weekend     Continuous Infusions:   insulin (HUMAN R) infusion (adults) 1 Units/hr (04/28/18 0802)     Scheduled Meds:   sodium chloride 0.9%   Intravenous Once    sodium chloride 0.9%   Intravenous Once    acetaminophen  999.0148 mg Per G Tube TID    albumin human 25%  25 g Intravenous Once    cetirizine  5 mg Per G Tube Daily    chlorhexidine  15 mL Mouth/Throat BID    epoetin jose miguel (PROCRIT) injection  4,000 Units Intravenous Every Mon, Wed, Fri    escitalopram oxalate  20 mg Per G Tube Daily    famotidine  20 mg Per G Tube QHS    guaiFENesin  600 mg Oral BID    heparin (porcine)  5,000 Units Subcutaneous Q12H    insulin aspart U-100  4 Units Subcutaneous TIDWM    levothyroxine  75 mcg Per G Tube Before breakfast    midodrine  10 mg Oral TID    polyethylene glycol  17 g Oral BID    predniSONE  5 mg Per G Tube Daily    QUEtiapine  50 mg Oral QHS    tacrolimus  4 mg Oral BID     PRN Meds:sodium chloride 0.9%, sodium chloride 0.9%, sodium chloride 0.9%, sodium chloride 0.9%, albuterol-ipratropium 2.5mg-0.5mg/3mL, ALPRAZolam, dextromethorphan-guaifenesin  mg/5 ml, dextrose 50%, dextrose 50%, glucagon (human recombinant), glucose, glucose, heparin (porcine), insulin aspart U-100, midodrine, ondansetron, oxyCODONE, povidone-iodine, ramelteon    Review of patient's allergies indicates:   Allergen Reactions    No known drug allergies      Objective:     Vital Signs (Most Recent):  Temp: 97.8 °F (36.6 °C) (04/30/18 0810)  Pulse: (!) 114 (04/30/18 1030)  Resp: 18 (04/30/18 1030)  BP: (!) 114/59  (04/30/18 1030)  SpO2: 96 % (04/30/18 0718) Vital Signs (24h Range):  Temp:  [97.7 °F (36.5 °C)-98.4 °F (36.9 °C)] 97.8 °F (36.6 °C)  Pulse:  [100-114] 114  Resp:  [18] 18  SpO2:  [95 %-99 %] 96 %  BP: ()/(47-80) 114/59     No data found.    Body mass index is 25.9 kg/m².      Intake/Output Summary (Last 24 hours) at 04/30/18 1046  Last data filed at 04/29/18 1400   Gross per 24 hour   Intake             1414 ml   Output                0 ml   Net             1414 ml       Hemodynamic Parameters:           Physical Exam   Constitutional: He appears well-developed. No distress.   Trach   HENT:   Head: Normocephalic and atraumatic.   Eyes: Conjunctivae and EOM are normal.   Cardiovascular: Regular rhythm.  Tachycardia present.    Pulmonary/Chest: He has no wheezes. He has no rales.   L IJ TDC   Abdominal: Soft. He exhibits no distension.   LUQ PEG  Midline incision with staples intact   Neurological: He is alert.   Skin:   Right necrotic toes 1-3  Left great toe necrotic-same       Significant Labs:  CBC:    Recent Labs  Lab 04/28/18  0500 04/29/18  0708 04/30/18  0615   WBC 4.09 3.81* 4.74   RBC 2.39* 2.36* 2.41*   HGB 7.5* 7.4* 7.5*   HCT 24.7* 24.4* 24.2*    284 299   * 103* 100*   MCH 31.4* 31.4* 31.1*   MCHC 30.4* 30.3* 31.0*     BNP:  No results for input(s): BNP in the last 168 hours.    Invalid input(s): BNPTRIAGELBLO  CMP:    Recent Labs  Lab 04/28/18  0500 04/29/18  0708 04/30/18  0615   * 321* 107   CALCIUM 9.2 10.1 10.3   ALBUMIN 2.2* 2.3* 2.3*   PROT 6.0 6.5 6.5   * 130* 130*   K 4.2 5.1 5.4*   CO2 23 21* 20*   CL 97 94* 95   BUN 30* 44* 57*   CREATININE 2.5* 3.5* 4.1*   ALKPHOS 187* 202* 215*   ALT 11 12 14   AST 15 14 18   BILITOT 0.5 0.4 0.3      Coagulation:   No results for input(s): PT, INR, APTT in the last 168 hours.  LDH:  No results for input(s): LDH in the last 72 hours.  Microbiology:  Microbiology Results (last 7 days)     Procedure Component Value Units  Date/Time    Culture, Respiratory with Gram Stain [[id number]] Collected:  04/26/18 1329    Order Status:  Completed Specimen:  Respiratory from Tracheal Aspirate Updated:  04/28/18 1151     Respiratory Culture Normal respiratory hank     Gram Stain (Respiratory) <10 epithelial cells per low power field.     Gram Stain (Respiratory) No WBC's     Gram Stain (Respiratory) Rare Gram negative rods    Respiratory Viral Panel by PCR [facility]; Nasal Swab [[id number]] Collected:  04/26/18 1123    Order Status:  Completed Specimen:  Respiratory Updated:  04/27/18 1110     Respiratory Virus Panel, source Nasal Swab     RVP - Adenovirus Not Detected     Comment: Detects Serotypes B and E. Detection of Serotype C may   be limited. If Adenovirus infection is suspected and a   Not Detected result is returned the sample should be   re-tested for Adenovirus using an independent method  (e.g. varinode Adenovirus Quantitative Real-Time  PCR test.          Enterovirus Not Detected     Comment: Cross-reactivity has been observed between certain Rhinovirus  strains and the Enterovirus assay.          Human Bocavirus Not Detected     Human Coronavirus Not Detected     Comment: The Human Coronavirus assay detects Human coronavirus types  229E, OC43,NL63 and HKU1.          RVP - Human Metapneumovirus (hMPV) Not Detected     RVP - Influenza A Not Detected     Influenza A - P1U5-77 Not Detected     RVP - Influenza B Not Detected     Parainfluenza Not Detected     Respiratory Syncytial VirusVirus (RSV) A Not Detected     Comment: The Respiratory Syncytial Viral assay detects types A and B,  however it does not distinguish between the two.          RVP - Rhinovirus Not Detected     Comment: Cross-Reactivity has been observed between certain   Rhinovirus strains and the Enterovirus assay.  Target Enriched Mulitplex Polymerase Chain Reaction (TEM-PCR)  allows for the detection of multiple pathogens out of a single  reaction.  This test was  developed and its performance   characteristics determined by [employer].  It has not   been cleared or approved by the U.S.Food and Drug Administration.  Results should be used in conjunction with clinical findings,   and should not form the sole basis for a diagnosis or treatment  decision.  TEM-PCR is a licensed technology of "[employer].         Narrative:       Receiving Lab:->[facility]    AFB Culture & Smear [[id number]] Collected:  03/14/18 1137    Order Status:  Completed Specimen:  Bronchial Wash from Amniotic Fluid Updated:  04/26/18 0927     AFB Culture & Smear Culture in progress     AFB Culture & Smear Culture in progress     AFB CULTURE STAIN No acid fast bacilli seen.          I have reviewed all pertinent labs within the past 24 hours.    Estimated Creatinine Clearance: 21.5 mL/min (A) (based on SCr of 4.1 mg/dL (H)).    Diagnostic Results:  I have reviewed and interpreted all pertinent imaging results/findings within the past 24 hours.    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 Most recently has had ongoing issues with his lungs, had gotten 2 thoracenteses, after which he underwent VATS 01/19/18 followed by pleurex catheter placement and effusion drainage 01/11/18, subsequently had it removed 02/07/18 after drainage had decreased despite multiple attempts to drain it. Has been having significant coughing fits that result in him being near-syncopal at times, although difficult to say if he has passed out or not- patient most recently was admitted to the hospital for increased AGUILAR, coughing and pre-syncope 02/11-02/14/18 at [facility].   Patient was started on antibiotics for suspected bacterial infection, with some diarrhea, bronchitis, and possible pneumonia. Ct chest showed some pleural  fluid collection and after talking with Dr. James, we arranged for him to receive a diagnostic tap with IR, from which the fluid collection demonstrated no growth or significant findings at all really. Cell counts do not appear to have been sent but will recheck. Patient states since his hospital stay, yesterday had a significant coughing fit again, after which he nearly passed out, is being seen in clinic today for this. Denies any cardiopulmonary complaints, no leg swelling, has some abdominal swelling, which is moderate for him. Denies significant shortness of breath with mild exertion, had 6MWT yesterday to see if he qualified for home O2, and his O2 sats actually improved after recovery from where he started initially. He and his wife admit he is in bed most days, not very active.     Mr. Crocker presented to the ED 3/11/18 with approximately 3 weeks of worsening diarrhea and 2-3 days of sinus pressure, drainage, and worsened cough.  He notes that he had a coughing fit last night and passed out, coughed throughout most of the night.  This also happened on 2/25/18.  He last saw Dr Ferreira in clinic on 2/20/18 where he was taken off myfortic and switched to cellcept (he hadn't been on cellcept because of leukopenia when they tried post-transplant).  Though his diarrhea had improved after his last discharge, he states it has increased now to 15x/day, clear/yellow/green, no fevers, no exacerbating foods per say.  He was taken back off the cellcept and placed back on myfortic this past week and has not noticed immediate change in diarrhea. He also reports his baseline coughing fits havent improved and are minimally responsive to tessalon pearls.  Came on last night, he lost consciousness during a fit once which is relatively normal for him, and had two more during HPI.  He notes no sick contacts but does state he's had some URI symptoms as above.  His baseline vitals are usually -120 and BP  90s/60s-110s/70s.  He reports a history of intermittent diarrhea - did have Cdiff many years ago but this smells different.  No abdominal pain, no nausea, no vomiting.  No blood in diarrhea.  Appears last c-scope in 2015 with no evidence of infection or inflammatory processes.  He does report IBS which is different that this.       * Acute respiratory failure with hypoxia    - Resolved. S/P Bronch and intubation 3/14 now post tracheostomy due to inability to extubate  - Etiology = RSV   - Pulmonology signed off. Completely weaned off vent.   - Appreciate PT/OT/Wound care.        Gastroparesis    - abdominal pain improved from weekend  - BM x4 on 4/25, passing gas  - KUB without evidence of bowel obstructionunclear if  worsening gastroparesis or new partial obstruction  - Per GI recs patient should tolerate up to 400 mL residuals. Will wean narcotics. Can complete CT AP if needed however pain is mild and improving at this point  - CMV PCR undetected        Alteration in skin integrity    - wound care following        Restrictive cardiomyopathy    - No changes (see above)         Type 1 diabetes mellitus with stage 3 chronic kidney disease    - Appreciate Endocrine's recs  - Insulin gtt per endocrine recs. Adjust as needed  - per endocrine please call when he is off tube feeds to adjust ggt and transition to rehab/outpatient regimen        Hypothyroidism due to Hashimoto's thyroiditis    - Appreciate Endocrine's recs  - Switched to PO Levothyroxine 75mcg daily        Acute renal failure with acute tubular necrosis superimposed on stage 3 chronic kidney disease    - Appreciate Nephrology recs.   - Continue middorine  - HD per nephrology.         Anemia    - Last transfusion 4/11  - Hgb stable but low   - Nephrology has resumed ProCrit         Heart transplanted    - TTE 4/24/18 showed normal graft function but has known history of restrictive CM post transplant.  - Continue Prednisone 5mg daily and increasedTacrolimus  to 4/4 with plan to adjust as needed for goal 6-10  - Tacro level pending this AM   - Cellcept remains on hold due to leukopenia        Debility    - PT/OT/SLP daily. Recommending rehab.  - Nutrition following.   - Passed MBSS with speech. Started regular diet with thin liquids, but will continue tube feeds at lower rate (30).         Anxiety    - Increased Lexapro to 20mg daily  - Continue seroquel 50 QHS   - Continue .5mg xanax PRN Q8H   - Appreciate psychiatry input.             JOSE DANIEL Zabala  Heart Transplant  [facility]

## 2018-04-30 NOTE — PLAN OF CARE
Problem: Physical Therapy Goal  Goal: Physical Therapy Goal  Goals to be met by: 18     Patient will increase functional independence with mobility by performin. Supine to sit with Moderate Assistance.  2. Sit to supine with Moderate Assistance.  3. Rolling to Left and Right with Minimal Assistance.   4. Sit to stand transfer with Moderate Assistance.  5. Bed to chair transfer with Maximum Assistance.  6. Gait  x 5 feet with Minimal Assistance.   7.  Pt to perf and be compliant with LE HEP to improve vascularity and mm strength.           Outcome: Ongoing (interventions implemented as appropriate)  Goals reviewed and remain appropriate. Pt progressing towards goals.    Petra Lake, PT, DPT   2018  [phone]

## 2018-04-30 NOTE — PROGRESS NOTES
[facility]  Endocrinology  Progress Note    Admit Date: 3/11/2018     Reason for Consult: abnormal TFTs    Surgical Procedure and Date: s/p heart transplant 2014     Diabetes diagnosis year: 7yo (T1DM)     Home Diabetes Medications:    Levemir 15u qHS  Novolog 4u AC     How often checking glucose at home? 4 times daily   BG readings on regimen: 150-200s  Hypoglycemia on the regimen?  Yes, rarely - treats appropriately (light snack, glucose tab)  Missed doses on regimen?  No        Diabetes Complications include:     Hyperglycemia, Hypoglycemia  and Diabetic chronic kidney disease          Complicating diabetes co morbidities:   N/A     HPI:   Patient is a 42 y.o. male with a diagnosis of T1DM, HTN, hypothyroidism, s/p heart transplant.  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 He required serial paracentesis and thoracentesis until he underwent a VATS with pleurX catheter placement on 1/11/2018 and removed 2/7/18.       Presented to hospital secondary to diarrhea and cough.  Upon initial labs, TSH was decreased (0.101) and free T4 1.33.  Endocrinology consulted to assist in management of these labs.  He was last seen in clinic by Kamala Vázquez NP 11/2017.   Previous hospitalization, endocrine consulted for same problem.  During that visit, recommended decreasing LT4 to 125mcg daily. Unfortunately, patient was discharged on previous dose of 150mcg daily.     Interval HPI:   Overnight events: Went to dialysis this AM. Insulin infusion temporarily held, then resumed at lower dose (holding tube feeds). Tube feeds now restarted and insulin increased back to 1.0 units/hr. Reports feeling a little bit stronger each day. Working with PT/OT.  Eating:   Yes ~70% of meals.  Nausea: No  Hypoglycemia and intervention: No  Fever: No  TPN and/or TF: Yes, TF @ 30/hr.    BP (!) 97/54 (BP Location: Right  "arm, Patient Position: Lying)   Pulse (!) 123   Temp 99.2 °F (37.3 °C) (Oral)   Resp 16   Ht 5' 7" (1.702 m)   Wt 75 kg (165 lb 5.5 oz)   SpO2 96%   BMI 25.90 kg/m²       Labs Reviewed and Include      Recent Labs  Lab 04/30/18  0615      CALCIUM 10.3   ALBUMIN 2.3*   PROT 6.5   *   K 5.4*   CO2 20*   CL 95   BUN 57*   CREATININE 4.1*   ALKPHOS 215*   ALT 14   AST 18   BILITOT 0.3     Lab Results   Component Value Date    WBC 4.74 04/30/2018    HGB 7.5 (L) 04/30/2018    HCT 24.2 (L) 04/30/2018     (H) 04/30/2018     04/30/2018     No results for input(s): TSH, FREET4 in the last 168 hours.  Lab Results   Component Value Date    HGBA1C 5.1 04/05/2018       Nutritional status:   Body mass index is 25.9 kg/m².  Lab Results   Component Value Date    ALBUMIN 2.3 (L) 04/30/2018    ALBUMIN 2.3 (L) 04/29/2018    ALBUMIN 2.2 (L) 04/28/2018     Lab Results   Component Value Date    PREALBUMIN 13 (L) 04/08/2018    PREALBUMIN 5 (L) 03/15/2018    PREALBUMIN 18 (L) 03/24/2015       Estimated Creatinine Clearance: 21.5 mL/min (A) (based on SCr of 4.1 mg/dL (H)).    Accu-Checks  Recent Labs      04/28/18   2109  04/29/18   0818  04/29/18   1201  04/29/18   1738  04/29/18   2159  04/30/18   0804  04/30/18   0946  04/30/18   1020  04/30/18   1316  04/30/18   1403   POCTGLUCOSE  298*  294*  283*  216*  194*  93  91  105  185*  224*       Current Medications and/or Treatments Impacting Glycemic Control  Immunotherapy:  Immunosuppressants         Stop Route Frequency     tacrolimus capsule 4 mg      -- Oral 2 times daily        Steroids:   Hormones     Start     Stop Route Frequency Ordered    05/01/18 0900  predniSONE tablet 5 mg      -- Oral Daily 04/30/18 1513        Pressors:    Autonomic Drugs     Start     Stop Route Frequency Ordered    04/30/18 0705  midodrine tablet 15 mg      -- Oral As needed (PRN) 04/30/18 0707    04/25/18 0900  midodrine tablet 10 mg      -- Oral 3 times daily 04/25/18 0650 "        Hyperglycemia/Diabetes Medications: Antihyperglycemics     Start     Stop Route Frequency Ordered    04/29/18 0830  insulin aspart U-100 pen 4 Units      -- SubQ 3 times daily with meals 04/29/18 0820    04/26/18 2000  insulin aspart U-100 pen 0-5 Units      -- SubQ Before meals & nightly PRN 04/26/18 1904    04/11/18 0015  insulin regular (Humulin R) 100 Units in sodium chloride 0.9% 100 mL infusion     Question:  Insulin Rate Adjustment (DO NOT MODIFY ANSWER)  Answer:  [email or web address]    -- IV Continuous 04/10/18 2313          ASSESSMENT and PLAN    * Acute respiratory failure with hypoxia    Per primary  S/p trach.  Practicing with speech valve.  Now back on diet.        On enteral nutrition    TF at goal May increase insulin needs          Current chronic use of systemic steroids    Can increase insulin requirement        Type 1 diabetes mellitus with stage 3 chronic kidney disease    BG goal 140-180,       Continue transition gtt at 1 u/hr  Continue novolog 4 units with meals  Low dose correction  POC glucose ac/hs/0200    Notify endocrine for changes in nutrition status (diet, TF, TPN)    Pt is T1DM, do not suspend insulin >1 hr   If TF held, decrease insulin rate to 0.4u/hr   (known to have controlled BG on basal needs of lev 5 daily/ 0.2u/hr during this hospitalization)      Discharge Recommendations:  Will need scheduled novolog q4 or regular q6 for TF and will need basal insulin for T1DM         Hypothyroidism due to Hashimoto's thyroiditis    continue iv levothyroxine 75mcg daily while inpt and on continuous TF   Switch to LT4 137mcg daily PO/G tube upon discharge/once TF changed to nocturnal regimen.   Separate LT4 1 hr from TF to ensure proper absorption           Acute renal failure with acute tubular necrosis superimposed on stage 3 chronic kidney disease    Avoid insulin stacking          Heart transplanted    Per transplant  Avoid  hypoglycemia  On PDN and prograf - could lead to prandial elevations and insulin resistance.            Corey Johnson MD  Endocrinology  [facility]

## 2018-04-30 NOTE — PLAN OF CARE
Problem: Nutrition, Imbalanced: Inadequate Oral Intake (Adult)  Intervention: Promote/Optimize Nutrition  Recommendations     Recommendation/Intervention: Patient is eating very well now that his diet is advanced - % of meals at this time.   -Recommend renal diet, texture per SLP.  -Recommend weaning off TF - change to nocturnal feeds of Novasource Renal at 30mL/hr 8pm - 8am tonight. If patient consumes =/>75% of meals Tuesday, d/c tube feeds.   -Send Novasource Renal ONS with meals. Encourage intake.   -RD following.      Goals: Meet % EEN, EPN  Nutrition Goal Status: goal met  Communication of RD Recs: reviewed with RN

## 2018-04-30 NOTE — SIGNIFICANT EVENT
Artificial Intelligence Notificaton      Admit Date: 3/11/2018  LOS: 49  Code Status: Full Code   Date of Consult: 2018  : 1975  Age: 42 y.o.  Weight:   Wt Readings from Last 1 Encounters:   18 75 kg (165 lb 5.5 oz)     Sex: male  Bed: 8098/8098 A:   MRN: [id number]  Attending Physician: Alexandr Hernández MD  Primary Service: [facility] HEART TRANSPLANT TEAM 2  Time AI Alert Received: 1249  Time at Bedside: 1255            Patient found lying in bed, awake, alert, and in no acute distress with family and nurse at bedside. He had just returned from dialysis where he reported 2.5L fluid was removed.       Vital Signs (Most Recent):  Temp: 98.9 °F (37.2 °C) (18 1237)  Pulse: (!) 121 (18 1237)  Resp: 18 (18 1237)  BP: (!) 96/46 (18 1237)  SpO2: (!) 94 % (18 1237) Vital Signs (24h Range):  Temp:  [97.7 °F (36.5 °C)-98.9 °F (37.2 °C)] 98.9 °F (37.2 °C)  Pulse:  [100-121] 121  Resp:  [18] 18  SpO2:  [94 %-99 %] 94 %  BP: ()/(45-80) 96/46         This is an  Artificial Intelligence Notification.     Artificial Intelligence alert discussed with Primary team: JOSE DANIEL Cai    Please place a Critical Care consult if evaluation/consultation is needed  The Critical Care Fellow can be reached at x [phone]    Ajit Caceres NP  Critical Care Medicine

## 2018-04-30 NOTE — ASSESSMENT & PLAN NOTE
- baseline sCr 2.6-3.0 consistent with CKD stage IV  - anuric CACHORRO; likely ischemic ATN from prolonged pre-renal state (diarrhea) in setting of prograf renal vasoconstriction; cannot r/o septic ATN or Prograf toxicity  - SLED started 3/14. TDC placed 4/4/18.   - remains anuric  -Per Physical Med & Rehab, patient approve for [facility] inpatient rehab (opens May 1) when off insulin gtt. SW to assisting to outpatient HD that will take patient with Trach.    -4/30 Patient seen in [facility]. Tolerating 2.5 L UF without so far albumin and midodrine PRN. Continue MWF HD while inpatient.

## 2018-04-30 NOTE — SUBJECTIVE & OBJECTIVE
Interval History: Seen in [facility]. Tolerating 2.5 L net UF. SBP 99. Denies shortness of breath or chest pain. Off TF. Still on  Insulin gtt. Eating 70-90% meals.     Review of patient's allergies indicates:   Allergen Reactions    No known drug allergies      Current Facility-Administered Medications   Medication Frequency    0.9%  NaCl infusion Once    0.9%  NaCl infusion PRN    0.9%  NaCl infusion PRN    0.9%  NaCl infusion PRN    0.9%  NaCl infusion PRN    0.9%  NaCl infusion Once    acetaminophen oral solution 999.0148 mg TID    albumin human 25% bottle 25 g Once    albuterol-ipratropium 2.5mg-0.5mg/3mL nebulizer solution 3 mL Q4H PRN    ALPRAZolam tablet 0.5 mg Q8H PRN    cetirizine tablet 5 mg Daily    chlorhexidine 0.12 % solution 15 mL BID    dextromethorphan-guaifenesin  mg/5 ml liquid 5 mL Q4H PRN    dextrose 50% injection 12.5 g PRN    dextrose 50% injection 25 g PRN    epoetin jose miguel injection 4,000 Units Every Mon, Wed, Fri    escitalopram oxalate tablet 20 mg Daily    famotidine tablet 20 mg QHS    glucagon (human recombinant) injection 1 mg PRN    glucose chewable tablet 16 g PRN    glucose chewable tablet 24 g PRN    guaiFENesin 12 hr tablet 600 mg BID    heparin (porcine) injection 1,000 Units PRN    heparin (porcine) injection 5,000 Units Q12H    insulin aspart U-100 pen 0-5 Units QID (AC + HS) PRN    insulin aspart U-100 pen 4 Units TIDWM    insulin regular (Humulin R) 100 Units in sodium chloride 0.9% 100 mL infusion Continuous    levothyroxine tablet 75 mcg Before breakfast    midodrine tablet 10 mg TID    midodrine tablet 15 mg PRN    ondansetron disintegrating tablet 4 mg Q6H PRN    oxyCODONE 5 mg/5 mL solution 5 mg Q6H PRN    polyethylene glycol packet 17 g BID    povidone-iodine 10 % ointment PRN    predniSONE tablet 5 mg Daily    QUEtiapine tablet 50 mg QHS    ramelteon tablet 8 mg Nightly PRN    tacrolimus capsule 4 mg BID       Objective:     Vital  Signs (Most Recent):  Temp: 97.8 °F (36.6 °C) (04/30/18 0810)  Pulse: 100 (04/30/18 1000)  Resp: 18 (04/30/18 1000)  BP: (!) 111/59 (04/30/18 1000)  SpO2: 96 % (04/30/18 0718)  O2 Device (Oxygen Therapy): room air (04/30/18 0718) Vital Signs (24h Range):  Temp:  [97.7 °F (36.5 °C)-98.4 °F (36.9 °C)] 97.8 °F (36.6 °C)  Pulse:  [100-114] 100  Resp:  [18] 18  SpO2:  [95 %-99 %] 96 %  BP: (108-149)/(55-80) 111/59     Weight: 75 kg (165 lb 5.5 oz) (04/27/18 0630)  Body mass index is 25.9 kg/m².  Body surface area is 1.88 meters squared.    I/O last 3 completed shifts:  In: 1414 [P.O.:1050; I.V.:24; NG/GT:340]  Out: -     Physical Exam   Constitutional: He appears well-developed. No distress.   Trach   HENT:   Head: Normocephalic and atraumatic.   Eyes: Conjunctivae and EOM are normal.   Cardiovascular: Regular rhythm.  Tachycardia present.    Pulmonary/Chest: He has no wheezes. He has no rales.   L IJ TDC   Abdominal: Soft. He exhibits no distension.   LUQ PEG  Midline incision with staples intact   Neurological: He is alert.   Skin:   Right necrotic toes 1-3  Left great toe necrotic-same       Significant Labs:  All labs within the past 24 hours have been reviewed.     Significant Imaging:  Labs: Reviewed

## 2018-04-30 NOTE — SUBJECTIVE & OBJECTIVE
Interval History: prograf increased yesterday to 4/4, no issues over weekend     Continuous Infusions:   insulin (HUMAN R) infusion (adults) 1 Units/hr (04/28/18 0802)     Scheduled Meds:   sodium chloride 0.9%   Intravenous Once    sodium chloride 0.9%   Intravenous Once    acetaminophen  999.0148 mg Per G Tube TID    albumin human 25%  25 g Intravenous Once    cetirizine  5 mg Per G Tube Daily    chlorhexidine  15 mL Mouth/Throat BID    epoetin jose miguel (PROCRIT) injection  4,000 Units Intravenous Every Mon, Wed, Fri    escitalopram oxalate  20 mg Per G Tube Daily    famotidine  20 mg Per G Tube QHS    guaiFENesin  600 mg Oral BID    heparin (porcine)  5,000 Units Subcutaneous Q12H    insulin aspart U-100  4 Units Subcutaneous TIDWM    levothyroxine  75 mcg Per G Tube Before breakfast    midodrine  10 mg Oral TID    polyethylene glycol  17 g Oral BID    predniSONE  5 mg Per G Tube Daily    QUEtiapine  50 mg Oral QHS    tacrolimus  4 mg Oral BID     PRN Meds:sodium chloride 0.9%, sodium chloride 0.9%, sodium chloride 0.9%, sodium chloride 0.9%, albuterol-ipratropium 2.5mg-0.5mg/3mL, ALPRAZolam, dextromethorphan-guaifenesin  mg/5 ml, dextrose 50%, dextrose 50%, glucagon (human recombinant), glucose, glucose, heparin (porcine), insulin aspart U-100, midodrine, ondansetron, oxyCODONE, povidone-iodine, ramelteon    Review of patient's allergies indicates:   Allergen Reactions    No known drug allergies      Objective:     Vital Signs (Most Recent):  Temp: 97.8 °F (36.6 °C) (04/30/18 0810)  Pulse: (!) 114 (04/30/18 1030)  Resp: 18 (04/30/18 1030)  BP: (!) 114/59 (04/30/18 1030)  SpO2: 96 % (04/30/18 0718) Vital Signs (24h Range):  Temp:  [97.7 °F (36.5 °C)-98.4 °F (36.9 °C)] 97.8 °F (36.6 °C)  Pulse:  [100-114] 114  Resp:  [18] 18  SpO2:  [95 %-99 %] 96 %  BP: ()/(47-80) 114/59     No data found.    Body mass index is 25.9 kg/m².      Intake/Output Summary (Last 24 hours) at 04/30/18  1046  Last data filed at 04/29/18 1400   Gross per 24 hour   Intake             1414 ml   Output                0 ml   Net             1414 ml       Hemodynamic Parameters:           Physical Exam   Constitutional: He appears well-developed. No distress.   Trach   HENT:   Head: Normocephalic and atraumatic.   Eyes: Conjunctivae and EOM are normal.   Cardiovascular: Regular rhythm.  Tachycardia present.    Pulmonary/Chest: He has no wheezes. He has no rales.   L IJ TDC   Abdominal: Soft. He exhibits no distension.   LUQ PEG  Midline incision with staples intact   Neurological: He is alert.   Skin:   Right necrotic toes 1-3  Left great toe necrotic-same       Significant Labs:  CBC:    Recent Labs  Lab 04/28/18  0500 04/29/18  0708 04/30/18  0615   WBC 4.09 3.81* 4.74   RBC 2.39* 2.36* 2.41*   HGB 7.5* 7.4* 7.5*   HCT 24.7* 24.4* 24.2*    284 299   * 103* 100*   MCH 31.4* 31.4* 31.1*   MCHC 30.4* 30.3* 31.0*     BNP:  No results for input(s): BNP in the last 168 hours.    Invalid input(s): BNPTRIAGELBLO  CMP:    Recent Labs  Lab 04/28/18  0500 04/29/18  0708 04/30/18  0615   * 321* 107   CALCIUM 9.2 10.1 10.3   ALBUMIN 2.2* 2.3* 2.3*   PROT 6.0 6.5 6.5   * 130* 130*   K 4.2 5.1 5.4*   CO2 23 21* 20*   CL 97 94* 95   BUN 30* 44* 57*   CREATININE 2.5* 3.5* 4.1*   ALKPHOS 187* 202* 215*   ALT 11 12 14   AST 15 14 18   BILITOT 0.5 0.4 0.3      Coagulation:   No results for input(s): PT, INR, APTT in the last 168 hours.  LDH:  No results for input(s): LDH in the last 72 hours.  Microbiology:  Microbiology Results (last 7 days)     Procedure Component Value Units Date/Time    Culture, Respiratory with Gram Stain [[id number]] Collected:  04/26/18 1329    Order Status:  Completed Specimen:  Respiratory from Tracheal Aspirate Updated:  04/28/18 1151     Respiratory Culture Normal respiratory hank     Gram Stain (Respiratory) <10 epithelial cells per low power field.     Gram Stain (Respiratory) No  WBC's     Gram Stain (Respiratory) Rare Gram negative rods    Respiratory Viral Panel by PCR [facility]; Nasal Swab [[id number]] Collected:  04/26/18 1123    Order Status:  Completed Specimen:  Respiratory Updated:  04/27/18 1110     Respiratory Virus Panel, source Nasal Swab     RVP - Adenovirus Not Detected     Comment: Detects Serotypes B and E. Detection of Serotype C may   be limited. If Adenovirus infection is suspected and a   Not Detected result is returned the sample should be   re-tested for Adenovirus using an independent method  (e.g. [employer] Adenovirus Quantitative Real-Time  PCR test.          Enterovirus Not Detected     Comment: Cross-reactivity has been observed between certain Rhinovirus  strains and the Enterovirus assay.          Human Bocavirus Not Detected     Human Coronavirus Not Detected     Comment: The Human Coronavirus assay detects Human coronavirus types  229E, OC43,NL63 and HKU1.          RVP - Human Metapneumovirus (hMPV) Not Detected     RVP - Influenza A Not Detected     Influenza A - R0F7-25 Not Detected     RVP - Influenza B Not Detected     Parainfluenza Not Detected     Respiratory Syncytial VirusVirus (RSV) A Not Detected     Comment: The Respiratory Syncytial Viral assay detects types A and B,  however it does not distinguish between the two.          RVP - Rhinovirus Not Detected     Comment: Cross-Reactivity has been observed between certain   Rhinovirus strains and the Enterovirus assay.  Target Enriched Mulitplex Polymerase Chain Reaction (TEM-PCR)  allows for the detection of multiple pathogens out of a single  reaction.  This test was developed and its performance   characteristics determined by [employer].  It has not   been cleared or approved by the U.S.Food and Drug Administration.  Results should be used in conjunction with clinical findings,   and should not form the sole basis for a diagnosis or treatment  decision.  TEM-PCR is a licensed technology of  [employer].         Narrative:       Receiving Lab:->[facility]    AFB Culture & Smear [[id number]] Collected:  03/14/18 1137    Order Status:  Completed Specimen:  Bronchial Wash from Amniotic Fluid Updated:  04/26/18 0927     AFB Culture & Smear Culture in progress     AFB Culture & Smear Culture in progress     AFB CULTURE STAIN No acid fast bacilli seen.          I have reviewed all pertinent labs within the past 24 hours.    Estimated Creatinine Clearance: 21.5 mL/min (A) (based on SCr of 4.1 mg/dL (H)).    Diagnostic Results:  I have reviewed and interpreted all pertinent imaging results/findings within the past 24 hours.

## 2018-04-30 NOTE — PT/OT/SLP PROGRESS
Physical Therapy Treatment    Patient Name:  Lv Crocker   MRN:  [id number]    Recommendations:     Discharge Recommendations:  rehabilitation facility   Discharge Equipment Recommendations:  (TBD)   Barriers to discharge: Inaccessible home and Decreased caregiver support    Assessment:     Lv Crocker is a 42 y.o. male admitted with a medical diagnosis of Acute respiratory failure with hypoxia.  He presents with the following impairments/functional limitations:  weakness, impaired functional mobilty, impaired balance, decreased upper extremity function, decreased lower extremity function, impaired endurance, impaired self care skills, gait instability, decreased coordination, impaired coordination, pain, decreased ROM, impaired fine motor, abnormal tone, impaired skin, impaired joint extensibility, impaired cardiopulmonary response to activity, impaired muscle length. Pt with diffuse pain this session, thus declined OOB activity. Tolerated rolling in B directions but with maxA needed to perform. Able to participate in supine therex BLE with weakness noted throughout, requiring assist to complete. Pt would continue to benefit from skilled acute PT in order to address current deficits and progress functional mobility.     Rehab Prognosis:  good; patient would benefit from acute skilled PT services to address these deficits and reach maximum level of function.      Recent Surgery: Procedure(s) (LRB):  INSERTION-CATHETER-PERM-A-CATH (Left)  INSERTION-TUBE-GASTROSTOMY (N/A) 26 Days Post-Op    Plan:     During this hospitalization, patient to be seen 5 x/week to address the above listed problems via gait training, therapeutic activities, therapeutic exercises, neuromuscular re-education  · Plan of Care Expires:  05/06/18   Plan of Care Reviewed with: patient, spouse    Subjective     Communicated with RN prior to session.  Patient found supine upon PT entry to room, agreeable to treatment.      Chief  "Complaint: diffuse pain   Patient comments/goals: "I'm not feeling up for getting out of the bed." Agreeable to supine activities and therex.   Pain/Comfort:  · Pain Rating 1:  (Pt reported pain "all over" but did not rate.)  · Pain Addressed 1: Reposition, Distraction    Patients cultural, spiritual, Evangelical conflicts given the current situation: none noted     Objective:     Patient found with: telemetry, peripheral IV, pressure relief boots, tracheostomy, PEG Tube     General Precautions: Standard, fall, aspiration   Orthopedic Precautions:N/A   Braces: N/A     Functional Mobility:  · Bed Mobility:     · Rolling Left:  maximal assistance x2 reps  · Rolling Right: maximal assistance x2 reps  · Scooting: total assistance of 2 persons with drawsheet to HOB      AM-PAC 6 CLICK MOBILITY  Turning over in bed (including adjusting bedclothes, sheets and blankets)?: 2  Sitting down on and standing up from a chair with arms (e.g., wheelchair, bedside commode, etc.): 2  Moving from lying on back to sitting on the side of the bed?: 2  Moving to and from a bed to a chair (including a wheelchair)?: 2  Need to walk in hospital room?: 1  Climbing 3-5 steps with a railing?: 1  Total Score: 10       Therapeutic Activities and Exercises:   Perform rolling in B directions x2 reps each directions with maxA in order to don pants and change blue pad. Performed sit-ups from elevated HOB with maxA in order to doff shirt and don clean shirt.   Performed supine therex BLE x10 reps each with AAROM: AP, heel slides, hip abd/add, SAQ.   Pt instructed to continue performing BLE AP x10 reps/hour. Pt v/u.     Patient left supine with all lines intact, call button in reach and pt's wife present..    GOALS:    Physical Therapy Goals        Problem: Physical Therapy Goal    Goal Priority Disciplines Outcome Goal Variances Interventions   Physical Therapy Goal     PT/OT, PT Ongoing (interventions implemented as appropriate)     Description:  " Goals to be met by: 18     Patient will increase functional independence with mobility by performin. Supine to sit with Moderate Assistance.  2. Sit to supine with Moderate Assistance.  3. Rolling to Left and Right with Minimal Assistance.   4. Sit to stand transfer with Moderate Assistance.  5. Bed to chair transfer with Maximum Assistance.  6. Gait  x 5 feet with Minimal Assistance.   7.  Pt to perf and be compliant with LE HEP to improve vascularity and mm strength.                            Time Tracking:     PT Received On: 18  PT Start Time: 1428     PT Stop Time: 1515  PT Total Time (min): 47 min     Billable Minutes: Therapeutic Activity 15 and Therapeutic Exercise 15    Treatment Type: Treatment  PT/PTA: PT     PTA Visit Number: 0     Petra Lake PT, DPT   2018  [phone]

## 2018-04-30 NOTE — PT/OT/SLP PROGRESS
Occupational Therapy   Treatment    Name: Lv Crocker  MRN: [id number]  Admitting Diagnosis:  Acute respiratory failure with hypoxia  26 Days Post-Op    Recommendations:     Discharge Recommendations: rehabilitation facility  Discharge Equipment Recommendations:     Barriers to discharge:  Decreased caregiver support, Inaccessible home environment    Subjective     Communicated with: nsg prior to session.cc with PT  Pain/Comfort:  · Pain Rating 1:  (says he has pain all over, ribs from coughing)  · Pain Addressed 1: Reposition, Distraction    Patients cultural, spiritual, Sabianist conflicts given the current situation: none    Objective:     Patient found with: central line, peripheral IV, PEG Tube, telemetry, tracheostomy    General Precautions: Standard, aspiration, fall   Orthopedic Precautions:N/A   Braces:       Occupational Performance:    Bed Mobility:    · Rolling x 4 reps with max assist      Functional Mobility/Transfers:  · Declined EOB this date due to pain, fatigue from a.m dialysis      Activities of Daily Living:  · UE dress with total assist, pulling to long sit with total assist for donning shirt, performed long sit x 3 reps for task- pt attempts to initiate with core to assist  · LE dress to brandi pants, rolling x 4 with max assist, donning pants total assist  · Socks total assist    Patient left HOB elevated with call button in reach and wife present    [facility] 6 Click:  [facility] Total Score: 6    Treatment & Education:  Pt performed BUE AAROM exercises for hand to mouth x 10 reps, biceps against gravity x 10 reps with eccentric biceps to lower, L shoulder eccentric contractions x 10 reps; gravity eliminated biceps and triceps x 10 reps with manual resistance each way; LUE crossbody punches x 10 reps AAROM. Educated on plan of care, exercises.   Education:    Assessment:     Lv Crocker is a 42 y.o. male with a medical diagnosis of Acute respiratory failure with hypoxia.  He  presents with good effort with exercises, pain and fatigue limiting today.  Performance deficits affecting function are weakness, impaired self care skills, gait instability, impaired balance, impaired functional mobilty, decreased lower extremity function, decreased upper extremity function, pain, impaired cardiopulmonary response to activity.      Rehab Prognosis:  good; patient would benefit from acute skilled OT services to address these deficits and reach maximum level of function.       Plan:     Patient to be seen 5 x/week to address the above listed problems via self-care/home management, therapeutic activities, therapeutic exercises, neuromuscular re-education  · Plan of Care Expires: 05/06/18  · Plan of Care Reviewed with: patient, spouse    This Plan of care has been discussed with the patient who was involved in its development and understands and is in agreement with the identified goals and treatment plan    GOALS:    Occupational Therapy Goals        Problem: Occupational Therapy Goal    Goal Priority Disciplines Outcome Interventions   Occupational Therapy Goal     OT, PT/OT Ongoing (interventions implemented as appropriate)    Description:  Goals to be met by: 5/8/18    Pt will follow 75% of motor commands with <2 verbal cues for repetition of task to maximize engagement in grooming task.--MET  Pt will complete feeding with mod A.   Pt will complete supine with HOB slightly elevated to seated at EOB with mod A in prep for seated grooming task.  Pt will engage in BUE exercises in orders to increase strength to 3+/5 in BUE's to increase engagement in seated grooming task  Pt will sit at EOB for approx 10 minutes with mod A and unilateral UE support to complete grooming task  Pt will complete sit>stand from EOB with bed in lowest position with mod A in prep for transfer to [facility]                       Time Tracking:     OT Date of Treatment: 04/30/18  OT Start Time: 1428  OT Stop Time: 1515  OT Total Time  (min): 47 min    Billable Minutes:Therapeutic Exercise 20 min   Self Care x 10 min    Delia To, OT  4/30/2018

## 2018-04-30 NOTE — PROGRESS NOTES
" [facility]  Adult Nutrition  Progress Note    SUMMARY       Recommendations    Recommendation/Intervention: Patient is eating very well now that his diet is advanced - % of meals at this time.   -Recommend renal diet, texture per SLP.  -Recommend weaning off TF - change to nocturnal feeds of Novasource Renal at 30mL/hr 8pm - 8am tonight. If patient consumes =/>75% of meals Tuesday, d/c tube feeds.   -Send Novasource Renal ONS with meals. Encourage intake.   -RD following.     Goals: Meet % EEN, EPN  Nutrition Goal Status: goal met  Communication of RD Recs: reviewed with RN    Reason for Assessment    Reason for Assessment: RD follow-up  Diagnosis: other (see comments) (Resp. fx)  Relevant Medical History: Hashimoto's disease, HTN, HLD, DM, CHF, Heart tx (2014)  Interdisciplinary Rounds: did not attend  General Information Comments: diet now advanced to regular/thins with swallowing precautions; wife reports pt eating almost 100% of meals, he's hungry and happy to eat, remains HD dependent (was in HD at visit)  Nutrition Discharge Planning: Tolerance of TF    Nutrition Risk Screen    Nutrition Risk Screen: large or nonhealing wound, burn or pressure ulcer, dysphagia or difficulty swallowing, tube feeding or parenteral nutrition    Nutrition/Diet History    Patient Reported Diet/Restrictions/Preferences: other (see comments) (HAO)  Do you have any cultural, spiritual, Scientology conflicts, given your current situation?: None  Factors Affecting Nutritional Intake: altered gastrointestinal function    Anthropometrics    Temp: 98.9 °F (37.2 °C)  Height Method: Stated  Height: 5' 7" (170.2 cm)  Height (inches): 67 in  Weight Method: Bed Scale  Weight: 75 kg (165 lb 5.5 oz)  Weight (lb): 165.35 lb  Ideal Body Weight (IBW), Male: 148 lb  % Ideal Body Weight, Male (lb): 111.43 lb  BMI (Calculated): 25.9  BMI Grade: 25 - 29.9 - overweight  Usual Body Weight (UBW), kg:  (HAO)   "     Lab/Procedures/Meds    Pertinent Labs Reviewed: reviewed  Pertinent Labs Comments: Na 130, K 5.4, BUN 57, Cr 4.1, GFR 16.6, Alk Phos 215  Pertinent Medications Reviewed: reviewed  Pertinent Medications Comments: epoetin, heparin, prednisone, tacrolimus, insulin    Physical Findings/Assessment    Overall Physical Appearance: underweight, other (see comments) (On trach collar)  Tubes: gastrostomy tube  Oral/Mouth Cavity: WDL  Skin: incision(s)    Estimated/Assessed Needs    Weight Used For Calorie Calculations: 66.9 kg (147 lb 7.8 oz)  Energy Calorie Requirements (kcal): 1896 (1.25x PAL)  Energy Need Method: [place] Jeor (1.25 PAL)  Protein Requirements: 80-94g (1.2-1.4 g/kg)  Weight Used For Protein Calculations: 66.9 kg (147 lb 7.8 oz)  Fluid Requirements (mL): 80-94  Fluid Need Method: other (see comments) (Per MD or 1 mL/kcal)  RDA Method (mL): 1896  CHO Requirement: 50% total kcals      Nutrition Prescription Ordered    Current Diet Order: Regular  Current Nutrition Support Formula Ordered: Novasource Renal  Current Nutrition Support Rate Ordered: 30 (ml)  Current Nutrition Support Frequency Ordered: mL/hr    Evaluation of Received Nutrient/Fluid Intake    Enteral Calories (kcal): 1440  Enteral Protein (gm): 65  Enteral (Free Water) Fluid (mL): 516  Other Calories (kcal): 0  % Kcal Needs: 100  % Protein Needs: 100  IV Fluid (mL): 0  Energy Calories Required: meeting needs  Protein Required: meeting needs  Fluid Required: not meeting needs  Comments: LBM 4/29  Tolerance: tolerating  % Intake of Estimated Energy Needs: 75 - 100 %  % Meal Intake: 75 - 100 %    Nutrition Risk    Level of Risk/Frequency of Follow-up:  (FU 1x/week)     Assessment and Plan    Severe malnutrition    Contributing Nutrition Diagnosis  Inadequate oral intake    Related to (etiology):  Tracheostomy 2' ARF    Signs and Symptoms (as evidenced by):   40# weight loss in past 3 months, on continuous TF     Interventions/Recommendations  (treatment strategy):  See recs    Nutrition Diagnosis Status:   Resolved               Monitor and Evaluation    Food and Nutrient Intake: enteral nutrition intake  Food and Nutrient Adminstration: enteral and parenteral nutrition administration  Physical Activity and Function: nutrition-related ADLs and IADLs  Anthropometric Measurements: weight, weight change  Biochemical Data, Medical Tests and Procedures: lipid profile, inflammatory profile, glucose/endocrine profile, gastrointestinal profile, electrolyte and renal panel  Nutrition-Focused Physical Findings: overall appearance     Nutrition Follow-Up    RD Follow-up?: Yes

## 2018-04-30 NOTE — PT/OT/SLP PROGRESS
Speech Language Pathology      Lv Crocker  MRN: [id number]    Patient not seen today secondary to off the floor for  dialysis. Speech service will re-attempt to follow up as schedule allows otherwise plan to follow-up 05/01/18.      Shannen Foley, SRUTHI-SLP

## 2018-04-30 NOTE — ASSESSMENT & PLAN NOTE
BG goal 140-180,       Continue transition gtt at 1 u/hr  Continue novolog 4 units with meals  Low dose correction  POC glucose ac/hs/0200    Notify endocrine for changes in nutrition status (diet, TF, TPN)    Pt is T1DM, do not suspend insulin >1 hr   If TF held, decrease insulin rate to 0.4u/hr   (known to have controlled BG on basal needs of lev 5 daily/ 0.2u/hr during this hospitalization)      Discharge Recommendations:  Will need scheduled novolog q4 or regular q6 for TF and will need basal insulin for T1DM

## 2018-04-30 NOTE — PROGRESS NOTES
Pt arrived via stretcher.  Placed on cardiac monitor and b/p check every 15 minutes.  Left chest wall Dialysis access prep with prevantic swab.  maintance  Hemodialysis started per orders.

## 2018-04-30 NOTE — PROGRESS NOTES
[facility]  Nephrology  Progress Note    Patient Name: Lv Crocker  MRN: [id number]  Admission Date: 3/11/2018  Hospital Length of Stay: 49 days  Attending Provider: Alexandr Hernández MD   Primary Care Physician: Philippe Mohr MD  Principal Problem:Acute respiratory failure with hypoxia    Subjective:     HPI: Mr. Crocker is a 43 yo WM with T1DM, Hashimoto thyroiditis, h/o OHTx 11/2014, and CKD stage 4 who was admitted on 3/11/18 for persistent diarrhea. He reported a 3 week history of diarrhea up to 15x/day. Associated symptoms including URI symptoms, coughing fits, and coughing syncope. Prograf level was 14.3. His post-heart transplant course has been complicated by AMR 4/2015, CMV, post-transplant restrictive cardiomyopathy, recurrent pleural effusions/ascites requiring monthly thoracenteses/paracenteses, VATS 1/11/18 with Pleur-X catheter (now removed), and CKD stage 4 with baseline sCr 2.6-3.0. Baseline -120s and baseline BP 90s-110s/60-70s. Upon admission he was started on IVF and diarrhea workup was initiated. On 3/12 he was noted to have decreased UOP despite fluids; NS was increased to 100cc/hr. He was scheduled for a colonoscopy on 3/13 however procedure was cancelled due to hypoxia and fever. He was transferred to the ICU for closer monitoring. He continued to have oliguria overnight. Bladder scan revealed 200cc of urine but patient refused osullivan catheter placement. Wife reports that patient always has a hard time urinating again after osullivan catheters are placed/removed so he refuses them. He remained hypoxic despite FiO2 70% and ABG revealed combined respiratory and metabolic acidosis with pH 7.17. He was started on BiPAP as well as a bicarb infusion at 50cc/hr. ABG with mild improvement. AM labs revealed K 6.2 and he was shifted and given kayexalate.Trialysis catheter was placed this morning and he subsequently developed hypotension and was started on levophed. He  was intubated later this morning. Nephrology consulted for CACHORRO. All history obtained by primary team and chart review as patient was intubated/sedated on exam. Consent for dialysis obtained by patient's wife and placed in chart. Of note, both of patient's parents were on dialysis.     Interval History: Seen in [facility]. Tolerating 2.5 L net UF. SBP 99. Denies shortness of breath or chest pain. Off TF. Still on  Insulin gtt. Eating 70-90% meals.     Review of patient's allergies indicates:   Allergen Reactions    No known drug allergies      Current Facility-Administered Medications   Medication Frequency    0.9%  NaCl infusion Once    0.9%  NaCl infusion PRN    0.9%  NaCl infusion PRN    0.9%  NaCl infusion PRN    0.9%  NaCl infusion PRN    0.9%  NaCl infusion Once    acetaminophen oral solution 999.0148 mg TID    albumin human 25% bottle 25 g Once    albuterol-ipratropium 2.5mg-0.5mg/3mL nebulizer solution 3 mL Q4H PRN    ALPRAZolam tablet 0.5 mg Q8H PRN    cetirizine tablet 5 mg Daily    chlorhexidine 0.12 % solution 15 mL BID    dextromethorphan-guaifenesin  mg/5 ml liquid 5 mL Q4H PRN    dextrose 50% injection 12.5 g PRN    dextrose 50% injection 25 g PRN    epoetin jose miguel injection 4,000 Units Every Mon, Wed, Fri    escitalopram oxalate tablet 20 mg Daily    famotidine tablet 20 mg QHS    glucagon (human recombinant) injection 1 mg PRN    glucose chewable tablet 16 g PRN    glucose chewable tablet 24 g PRN    guaiFENesin 12 hr tablet 600 mg BID    heparin (porcine) injection 1,000 Units PRN    heparin (porcine) injection 5,000 Units Q12H    insulin aspart U-100 pen 0-5 Units QID (AC + HS) PRN    insulin aspart U-100 pen 4 Units TIDWM    insulin regular (Humulin R) 100 Units in sodium chloride 0.9% 100 mL infusion Continuous    levothyroxine tablet 75 mcg Before breakfast    midodrine tablet 10 mg TID    midodrine tablet 15 mg PRN    ondansetron disintegrating tablet 4 mg Q6H PRN     oxyCODONE 5 mg/5 mL solution 5 mg Q6H PRN    polyethylene glycol packet 17 g BID    povidone-iodine 10 % ointment PRN    predniSONE tablet 5 mg Daily    QUEtiapine tablet 50 mg QHS    ramelteon tablet 8 mg Nightly PRN    tacrolimus capsule 4 mg BID       Objective:     Vital Signs (Most Recent):  Temp: 97.8 °F (36.6 °C) (04/30/18 0810)  Pulse: 100 (04/30/18 1000)  Resp: 18 (04/30/18 1000)  BP: (!) 111/59 (04/30/18 1000)  SpO2: 96 % (04/30/18 0718)  O2 Device (Oxygen Therapy): room air (04/30/18 0718) Vital Signs (24h Range):  Temp:  [97.7 °F (36.5 °C)-98.4 °F (36.9 °C)] 97.8 °F (36.6 °C)  Pulse:  [100-114] 100  Resp:  [18] 18  SpO2:  [95 %-99 %] 96 %  BP: (108-149)/(55-80) 111/59     Weight: 75 kg (165 lb 5.5 oz) (04/27/18 0630)  Body mass index is 25.9 kg/m².  Body surface area is 1.88 meters squared.    I/O last 3 completed shifts:  In: 1414 [P.O.:1050; I.V.:24; NG/GT:340]  Out: -     Physical Exam   Constitutional: He appears well-developed. No distress.   Trach   HENT:   Head: Normocephalic and atraumatic.   Eyes: Conjunctivae and EOM are normal.   Cardiovascular: Regular rhythm.  Tachycardia present.    Pulmonary/Chest: He has no wheezes. He has no rales.   L IJ TDC   Abdominal: Soft. He exhibits no distension.   LUQ PEG  Midline incision with staples intact   Neurological: He is alert.   Skin:   Right necrotic toes 1-3  Left great toe necrotic-same       Significant Labs:  All labs within the past 24 hours have been reviewed.     Significant Imaging:  Labs: Reviewed    Assessment/Plan:     Acute renal failure with acute tubular necrosis superimposed on stage 3 chronic kidney disease    - baseline sCr 2.6-3.0 consistent with CKD stage IV  - anuric CACHORRO; likely ischemic ATN from prolonged pre-renal state (diarrhea) in setting of prograf renal vasoconstriction; cannot r/o septic ATN or Prograf toxicity  - SLED started 3/14. TDC placed 4/4/18.   - remains anuric  -Per [facility], patient approve  for [facility] inpatient rehab (opens May 1) when off insulin gtt. SW to assisting to outpatient HD that will take patient with Trach.    -4/30 Patient seen in [facility]. Tolerating 2.5 L UF without so far albumin and midodrine PRN. Continue MWF HD while inpatient.             Thank you for your consult. I will follow-up with patient. Please contact us if you have any additional questions.    Amanda Cuellar NP  Nephrology  [facility]

## 2018-04-30 NOTE — ASSESSMENT & PLAN NOTE
Contributing Nutrition Diagnosis  Inadequate oral intake    Related to (etiology):  Tracheostomy 2' ARF    Signs and Symptoms (as evidenced by):   40# weight loss in past 3 months, on continues TF - stopped frequently     Interventions/Recommendations (treatment strategy):  See recs    Nutrition Diagnosis Status:   Resolved

## 2018-04-30 NOTE — PLAN OF CARE
Problem: Patient Care Overview  Goal: Plan of Care Review  Outcome: Ongoing (interventions implemented as appropriate)  Pt is AAOx4, totally dependent, and VSS-tachycardia reported to HTS and hypotension managed with Midodrine. Pt denies pain. Tube feeds infusing continuously at 30ml/hr-50ml residual. Transitional insulin infusing at 1 unit/hr. G tube dressing changed. Pt received HD. Blood glucose monitoring performed ad treated. Telemetry monitoring in place. Pt's bed in lowest position, HOB at 60 degrees, call bell within reach, and nonskid socks on. Pt's weight shifted through out shift and barrier cream applied to buttock. Will continue to monitor.

## 2018-04-30 NOTE — SUBJECTIVE & OBJECTIVE
"Interval HPI:   Overnight events: Went to dialysis this AM. Insulin infusion temporarily held, then resumed at lower dose (holding tube feeds). Tube feeds now restarted and insulin increased back to 1.0 units/hr. Reports feeling a little bit stronger each day. Working with PT/OT.  Eating:   Yes ~70% of meals.  Nausea: No  Hypoglycemia and intervention: No  Fever: No  TPN and/or TF: Yes, TF @ 30/hr.    BP (!) 97/54 (BP Location: Right arm, Patient Position: Lying)   Pulse (!) 123   Temp 99.2 °F (37.3 °C) (Oral)   Resp 16   Ht 5' 7" (1.702 m)   Wt 75 kg (165 lb 5.5 oz)   SpO2 96%   BMI 25.90 kg/m²     Labs Reviewed and Include      Recent Labs  Lab 04/30/18  0615      CALCIUM 10.3   ALBUMIN 2.3*   PROT 6.5   *   K 5.4*   CO2 20*   CL 95   BUN 57*   CREATININE 4.1*   ALKPHOS 215*   ALT 14   AST 18   BILITOT 0.3     Lab Results   Component Value Date    WBC 4.74 04/30/2018    HGB 7.5 (L) 04/30/2018    HCT 24.2 (L) 04/30/2018     (H) 04/30/2018     04/30/2018     No results for input(s): TSH, FREET4 in the last 168 hours.  Lab Results   Component Value Date    HGBA1C 5.1 04/05/2018       Nutritional status:   Body mass index is 25.9 kg/m².  Lab Results   Component Value Date    ALBUMIN 2.3 (L) 04/30/2018    ALBUMIN 2.3 (L) 04/29/2018    ALBUMIN 2.2 (L) 04/28/2018     Lab Results   Component Value Date    PREALBUMIN 13 (L) 04/08/2018    PREALBUMIN 5 (L) 03/15/2018    PREALBUMIN 18 (L) 03/24/2015       Estimated Creatinine Clearance: 21.5 mL/min (A) (based on SCr of 4.1 mg/dL (H)).    Accu-Checks  Recent Labs      04/28/18   2109  04/29/18   0818  04/29/18   1201  04/29/18   1738  04/29/18   2159  04/30/18   0804  04/30/18   0946  04/30/18   1020  04/30/18   1316  04/30/18   1403   POCTGLUCOSE  298*  294*  283*  216*  194*  93  91  105  185*  224*       Current Medications and/or Treatments Impacting Glycemic Control  Immunotherapy:  Immunosuppressants         Stop Route Frequency     " tacrolimus capsule 4 mg      -- Oral 2 times daily        Steroids:   Hormones     Start     Stop Route Frequency Ordered    05/01/18 0900  predniSONE tablet 5 mg      -- Oral Daily 04/30/18 1513        Pressors:    Autonomic Drugs     Start     Stop Route Frequency Ordered    04/30/18 0705  midodrine tablet 15 mg      -- Oral As needed (PRN) 04/30/18 0707    04/25/18 0900  midodrine tablet 10 mg      -- Oral 3 times daily 04/25/18 0650        Hyperglycemia/Diabetes Medications: Antihyperglycemics     Start     Stop Route Frequency Ordered    04/29/18 0830  insulin aspart U-100 pen 4 Units      -- SubQ 3 times daily with meals 04/29/18 0820    04/26/18 2000  insulin aspart U-100 pen 0-5 Units      -- SubQ Before meals & nightly PRN 04/26/18 1904    04/11/18 0015  insulin regular (Humulin R) 100 Units in sodium chloride 0.9% 100 mL infusion     Question:  Insulin Rate Adjustment (DO NOT MODIFY ANSWER)  Answer:  [email or web address]    -- IV Continuous 04/10/18 231

## 2018-05-01 NOTE — PROGRESS NOTES
Following for skin concerns     05/01/18 1130       Wound 04/06/18 1700  distal Toe, third   Date First Assessed/Time First Assessed: 04/06/18 1700   Pre-existing: No  Wound Type: (c)   Side: Right  Orientation: distal  Location: (c) Toe, third   Wound Image    Wound WDL ex   Dressing Appearance Open to air;No dressing   Drainage Amount None   Appearance Black;Eschar;Dry   Periwound Area Intact   Care Cleansed with:;Sterile normal saline;Applied:;Povidone iodine       Wound 04/17/18 1200 Toe, first   Date First Assessed/Time First Assessed: 04/17/18 1200   Side: Left  Location: Toe, first   Wound Image    Wound WDL ex   Dressing Appearance Open to air;No dressing   Drainage Amount None   Appearance Black;Eschar   Periwound Area Intact   Wound Length (cm) 1 cm   Wound Width (cm) 1 cm   Care Cleansed with:;Sterile normal saline;Applied:;Povidone iodine       Wound 05/01/18 1130 Moisture associated dermatitis medial Gluteal cleft   Date First Assessed/Time First Assessed: 05/01/18 1130   Pre-existing: No  Primary Wound Type: Moisture associated dermatitis  Orientation: medial  Location: Gluteal cleft   Wound Image    Wound WDL ex   Dressing Appearance Open to air;No dressing   Drainage Amount None   Appearance Pink   Tissue loss description Partial thickness   Periwound Area Intact   Wound Edges Open   Wound Length (cm) 1 cm   Wound Width (cm) 0.2 cm   Wound Depth (cm) 0.1 cm   Wound Volume (cm^3) 0.02 cm^3   Care Cleansed with:;Soap and water;Applied:;Skin Barrier       Pressure Injury 04/17/18 1200 Left Buttocks Deep tissue injury   Date First Assessed/Time First Assessed: 04/17/18 1200   Pressure Injury Present on Admission: no  Side: Left  Location: Buttocks  Staging: Deep tissue injury   Wound Image    Staging U   Dressing Appearance Open to air;No dressing   Drainage Amount None   Appearance Dry  (brown eschar)   Periwound Area Redness   Wound Length (cm) 0.3 cm   Wound Width (cm) 0.3 cm   Wound Depth (cm)  (obscured)   Care Cleansed with:;Soap and water;Applied:;Skin Barrier     New ulceration in gluteal crease and area of eschar to left buttock at site of resolving DTI.  Critic aid clear applied to area as skin is quite dry.  Dc Garcia RN CWON  [id number]

## 2018-05-01 NOTE — PROGRESS NOTES
[facility]  Heart Transplant  Progress Note    Patient Name: Lv Crocker  MRN: [id number]  Admission Date: 3/11/2018  Hospital Length of Stay: 50 days  Attending Physician: Alexandr Hernández MD  Primary Care Provider: Philippe Mohr MD  Principal Problem:Acute respiratory failure with hypoxia    Subjective:     Interval History: had some nausea overnight with hypoglycemia, seen by nutrition who rec nocturnal feeds only     Continuous Infusions:   insulin (HUMAN R) infusion (adults) 0.8 Units/hr (05/01/18 0918)     Scheduled Meds:   acetaminophen  650 mg Oral TID    albumin human 25%  25 g Intravenous Once    cetirizine  5 mg Oral Daily    epoetin jose miguel (PROCRIT) injection  4,000 Units Intravenous Every Mon, Wed, Fri    escitalopram oxalate  20 mg Oral Daily    famotidine  20 mg Oral QHS    guaiFENesin  600 mg Oral BID    heparin (porcine)  5,000 Units Subcutaneous Q12H    insulin aspart U-100  3 Units Subcutaneous TIDWM    levothyroxine  137 mcg Oral Before breakfast    midodrine  10 mg Oral TID    polyethylene glycol  17 g Oral BID    predniSONE  5 mg Oral Daily    QUEtiapine  50 mg Oral QHS    tacrolimus  4 mg Oral BID     PRN Meds:sodium chloride 0.9%, albuterol-ipratropium 2.5mg-0.5mg/3mL, ALPRAZolam, benzonatate, dextrose 50%, dextrose 50%, glucagon (human recombinant), glucose, glucose, heparin (porcine), insulin aspart U-100, midodrine, ondansetron, oxyCODONE    Review of patient's allergies indicates:   Allergen Reactions    No known drug allergies      Objective:     Vital Signs (Most Recent):  Temp: 100.1 °F (37.8 °C) (05/01/18 0745)  Pulse: (!) 119 (05/01/18 1112)  Resp: 20 (05/01/18 0840)  BP: (!) 96/46 (05/01/18 0745)  SpO2: 96 % (05/01/18 0840) Vital Signs (24h Range):  Temp:  [98.9 °F (37.2 °C)-100.1 °F (37.8 °C)] 100.1 °F (37.8 °C)  Pulse:  [116-126] 119  Resp:  [16-20] 20  SpO2:  [92 %-97 %] 96 %  BP: ()/(45-57) 96/46     No data found.    Body  mass index is 25.9 kg/m².      Intake/Output Summary (Last 24 hours) at 05/01/18 1129  Last data filed at 05/01/18 0817   Gross per 24 hour   Intake              770 ml   Output             3100 ml   Net            -2330 ml       Hemodynamic Parameters:           Physical Exam   Constitutional: He appears well-developed. No distress.   Trach   HENT:   Head: Normocephalic and atraumatic.   Eyes: Conjunctivae and EOM are normal.   Cardiovascular: Regular rhythm.  Tachycardia present.    Pulmonary/Chest: He has no wheezes. He has no rales.   L IJ TDC   Abdominal: Soft. He exhibits no distension.   LUQ PEG  Midline incision with staples intact   Neurological: He is alert.   Skin:   Right necrotic toes 1-3  Left great toe necrotic-same       Significant Labs:  CBC:    Recent Labs  Lab 04/29/18  0708 04/30/18  0615 05/01/18  0425   WBC 3.81* 4.74 5.18   RBC 2.36* 2.41* 2.34*   HGB 7.4* 7.5* 7.3*   HCT 24.4* 24.2* 23.7*    299 305   * 100* 101*   MCH 31.4* 31.1* 31.2*   MCHC 30.3* 31.0* 30.8*     BNP:  No results for input(s): BNP in the last 168 hours.    Invalid input(s): BNPTRIAGELBLO  CMP:    Recent Labs  Lab 04/29/18  0708 04/30/18  0615 05/01/18  0426   * 107 305*   CALCIUM 10.1 10.3 9.0   ALBUMIN 2.3* 2.3* 2.1*   PROT 6.5 6.5 6.0   * 130* 134*   K 5.1 5.4* 4.1   CO2 21* 20* 24   CL 94* 95 96   BUN 44* 57* 26*   CREATININE 3.5* 4.1* 2.6*   ALKPHOS 202* 215* 210*   ALT 12 14 15   AST 14 18 20   BILITOT 0.4 0.3 0.5      Coagulation:   No results for input(s): PT, INR, APTT in the last 168 hours.  LDH:  No results for input(s): LDH in the last 72 hours.  Microbiology:  Microbiology Results (last 7 days)     Procedure Component Value Units Date/Time    Culture, Respiratory with Gram Stain [[id number]] Collected:  04/26/18 1329    Order Status:  Completed Specimen:  Respiratory from Tracheal Aspirate Updated:  04/30/18 1253     Respiratory Culture Normal respiratory hank     Gram Stain  (Respiratory) <10 epithelial cells per low power field.     Gram Stain (Respiratory) No WBC's     Gram Stain (Respiratory) Rare Gram negative rods    Respiratory Viral Panel by PCR [facility]; Nasal Swab [[id number]] Collected:  04/26/18 1123    Order Status:  Completed Specimen:  Respiratory Updated:  04/27/18 1110     Respiratory Virus Panel, source Nasal Swab     RVP - Adenovirus Not Detected     Comment: Detects Serotypes B and E. Detection of Serotype C may   be limited. If Adenovirus infection is suspected and a   Not Detected result is returned the sample should be   re-tested for Adenovirus using an independent method  (e.g. [employer] Adenovirus Quantitative Real-Time  PCR test.          Enterovirus Not Detected     Comment: Cross-reactivity has been observed between certain Rhinovirus  strains and the Enterovirus assay.          Human Bocavirus Not Detected     Human Coronavirus Not Detected     Comment: The Human Coronavirus assay detects Human coronavirus types  229E, OC43,NL63 and HKU1.          RVP - Human Metapneumovirus (hMPV) Not Detected     RVP - Influenza A Not Detected     Influenza A - K5H5-43 Not Detected     RVP - Influenza B Not Detected     Parainfluenza Not Detected     Respiratory Syncytial VirusVirus (RSV) A Not Detected     Comment: The Respiratory Syncytial Viral assay detects types A and B,  however it does not distinguish between the two.          RVP - Rhinovirus Not Detected     Comment: Cross-Reactivity has been observed between certain   Rhinovirus strains and the Enterovirus assay.  Target Enriched Mulitplex Polymerase Chain Reaction (TEM-PCR)  allows for the detection of multiple pathogens out of a single  reaction.  This test was developed and its performance   characteristics determined by [employer].  It has not   been cleared or approved by the U.S.Food and Drug Administration.  Results should be used in conjunction with clinical findings,   and should not form the  sole basis for a diagnosis or treatment  decision.  TEM-PCR is a licensed technology of [employer].         Narrative:       Receiving Lab:->[facility]    AFB Culture & Smear [[id number]] Collected:  03/14/18 1137    Order Status:  Completed Specimen:  Bronchial Wash from Amniotic Fluid Updated:  04/26/18 0927     AFB Culture & Smear Culture in progress     AFB Culture & Smear Culture in progress     AFB CULTURE STAIN No acid fast bacilli seen.          I have reviewed all pertinent labs within the past 24 hours.    Estimated Creatinine Clearance: 33.9 mL/min (A) (based on SCr of 2.6 mg/dL (H)).    Diagnostic Results:  I have reviewed and interpreted all pertinent imaging results/findings within the past 24 hours.    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 Most recently has had ongoing issues with his lungs, had gotten 2 thoracenteses, after which he underwent VATS 01/19/18 followed by pleurex catheter placement and effusion drainage 01/11/18, subsequently had it removed 02/07/18 after drainage had decreased despite multiple attempts to drain it. Has been having significant coughing fits that result in him being near-syncopal at times, although difficult to say if he has passed out or not- patient most recently was admitted to the hospital for increased AGUILAR, coughing and pre-syncope 02/11-02/14/18 at [facility].   Patient was started on antibiotics for suspected bacterial infection, with some diarrhea, bronchitis, and possible pneumonia. Ct chest showed some pleural fluid collection and after talking with Dr. James, we arranged for him to receive a diagnostic tap with IR, from which the fluid collection demonstrated no growth or significant findings at all really. Cell counts do not appear to have been sent  but will recheck. Patient states since his hospital stay, yesterday had a significant coughing fit again, after which he nearly passed out, is being seen in clinic today for this. Denies any cardiopulmonary complaints, no leg swelling, has some abdominal swelling, which is moderate for him. Denies significant shortness of breath with mild exertion, had 6MWT yesterday to see if he qualified for home O2, and his O2 sats actually improved after recovery from where he started initially. He and his wife admit he is in bed most days, not very active.     Mr. Crocker presented to the ED 3/11/18 with approximately 3 weeks of worsening diarrhea and 2-3 days of sinus pressure, drainage, and worsened cough.  He notes that he had a coughing fit last night and passed out, coughed throughout most of the night.  This also happened on 2/25/18.  He last saw Dr Ferreira in clinic on 2/20/18 where he was taken off myfortic and switched to cellcept (he hadn't been on cellcept because of leukopenia when they tried post-transplant).  Though his diarrhea had improved after his last discharge, he states it has increased now to 15x/day, clear/yellow/green, no fevers, no exacerbating foods per say.  He was taken back off the cellcept and placed back on myfortic this past week and has not noticed immediate change in diarrhea. He also reports his baseline coughing fits havent improved and are minimally responsive to tessalon pearls.  Came on last night, he lost consciousness during a fit once which is relatively normal for him, and had two more during HPI.  He notes no sick contacts but does state he's had some URI symptoms as above.  His baseline vitals are usually -120 and BP 90s/60s-110s/70s.  He reports a history of intermittent diarrhea - did have Cdiff many years ago but this smells different.  No abdominal pain, no nausea, no vomiting.  No blood in diarrhea.  Appears last c-scope in 2015 with no evidence of infection or inflammatory  processes.  He does report IBS which is different that this.       * Acute respiratory failure with hypoxia    - Resolved. S/P Bronch and intubation 3/14 now post tracheostomy due to inability to extubate  - Etiology = RSV   - Pulmonology signed off. Completely weaned off vent.   - Appreciate PT/OT/Wound care.        Gastroparesis    - abdominal pain improved from weekend  - BM x4 on 4/25, passing gas  - KUB without evidence of bowel obstructionunclear if  worsening gastroparesis or new partial obstruction  - Per GI recs patient should tolerate up to 400 mL residuals. Will wean narcotics. Can complete CT AP if needed however pain is mild and improving at this point  - CMV PCR undetected        Alteration in skin integrity    - wound care following        Restrictive cardiomyopathy    - No changes (see above)         Type 1 diabetes mellitus with stage 3 chronic kidney disease    - Appreciate Endocrine's recs  - Insulin gtt per endocrine recs. Adjust as needed  - per endocrine please call when he is off tube feeds to adjust ggt and transition to rehab/outpatient regimen        Hypothyroidism due to Hashimoto's thyroiditis    - Appreciate Endocrine's recs  - Switched to PO Levothyroxine 75mcg daily        Acute renal failure with acute tubular necrosis superimposed on stage 3 chronic kidney disease    - Appreciate Nephrology recs.   - Continue middorine  - HD per nephrology.         Anemia    - Last transfusion 4/11  - Hgb stable but low   - Nephrology has resumed ProCrit         Heart transplanted    - TTE 4/24/18 showed normal graft function but has known history of restrictive CM post transplant.  - Continue Prednisone 5mg daily and increased Tacrolimus to 4/4 with plan to adjust as needed for goal 6-10  - Tacro level pending this AM   - Cellcept remains on hold due to leukopenia        Debility    - PT/OT/SLP daily. Recommending rehab.  - Nutrition following.   - Passed MBSS with speech. Started regular diet with  thin liquids, but will continue tube feeds nocturnally only          Anxiety    - Increased Lexapro to 20mg daily  - Continue seroquel 50 QHS   - Continue .5mg xanax PRN Q8H   - Appreciate psychiatry input.             JOSE DANIEL Zabala  Heart Transplant  [facility]

## 2018-05-01 NOTE — PROGRESS NOTES
UPDATE    SW to pt's room for update. Pt presents as aaox3 with calm and pleasant affect, sitting in chair at bedside. Pt reports coping adequately with no needs at this time. Pt reports in agreement with plan to d/c to [facility] later this week.    [facility] followed up with [facility] for pt's clinic assignment and chair time. Central intake states pt has been accepted by the [facility] ph [phone] and the chair time is unknown at this time. SW called the [facility] regarding chair time and was told pt is NOT accepted, due to trach needs. SW spoke again with [facility], who states they were told pt was accepted. SW waiting on follow up phone call from [facility].  and HTS team aware. SW providing psychosocial and counseling support, education, resources, and d/c planning as needed. SW continuing to follow and remains available.

## 2018-05-01 NOTE — SUBJECTIVE & OBJECTIVE
"Interval HPI:   Overnight events: Hypoglycemia noted yesterday afternoon. Insulin gtt decreased, and now hyperglycemic this AM. Working with PT. Tube feeds on continuously  Eatin%  Nausea: No  Hypoglycemia and intervention: Yes, d50 x 1  Fever: No  TPN and/or TF: Yes  If yes, type of TF/TPN and rate: Novasource renal @ 30/hr    BP (!) 96/46 (BP Location: Right arm, Patient Position: Lying)   Pulse (!) 125   Temp 100.1 °F (37.8 °C) (Oral)   Resp 20   Ht 5' 7" (1.702 m)   Wt 75 kg (165 lb 5.5 oz)   SpO2 96%   BMI 25.90 kg/m²     Labs Reviewed and Include      Recent Labs  Lab 18  0426   *   CALCIUM 9.0   ALBUMIN 2.1*   PROT 6.0   *   K 4.1   CO2 24   CL 96   BUN 26*   CREATININE 2.6*   ALKPHOS 210*   ALT 15   AST 20   BILITOT 0.5     Lab Results   Component Value Date    WBC 5.18 2018    HGB 7.3 (L) 2018    HCT 23.7 (L) 2018     (H) 2018     2018     No results for input(s): TSH, FREET4 in the last 168 hours.  Lab Results   Component Value Date    HGBA1C 5.1 2018       Nutritional status:   Body mass index is 25.9 kg/m².  Lab Results   Component Value Date    ALBUMIN 2.1 (L) 2018    ALBUMIN 2.3 (L) 2018    ALBUMIN 2.3 (L) 2018     Lab Results   Component Value Date    PREALBUMIN 13 (L) 2018    PREALBUMIN 5 (L) 03/15/2018    PREALBUMIN 18 (L) 2015       Estimated Creatinine Clearance: 33.9 mL/min (A) (based on SCr of 2.6 mg/dL (H)).    Accu-Checks  Recent Labs      18   0946  18   1020  18   1316  18   1403  18   1832  18   1834  18   1904  18   2112  18   0245  18   0748   POCTGLUCOSE  91  105  185*  224*  39*  42*  159*  202*  283*  315*       Current Medications and/or Treatments Impacting Glycemic Control  Immunotherapy:  Immunosuppressants         Stop Route Frequency     tacrolimus capsule 4 mg      -- Oral 2 times daily        Steroids: "   Hormones     Start     Stop Route Frequency Ordered    05/01/18 0900  predniSONE tablet 5 mg      -- Oral Daily 04/30/18 1513        Pressors:    Autonomic Drugs     Start     Stop Route Frequency Ordered    04/30/18 0705  midodrine tablet 15 mg      -- Oral As needed (PRN) 04/30/18 0707    04/25/18 0900  midodrine tablet 10 mg      -- Oral 3 times daily 04/25/18 0650        Hyperglycemia/Diabetes Medications: Antihyperglycemics     Start     Stop Route Frequency Ordered    05/01/18 0715  insulin aspart U-100 pen 3 Units      -- SubQ 3 times daily with meals 04/30/18 1920    04/26/18 2000  insulin aspart U-100 pen 0-5 Units      -- SubQ Before meals & nightly PRN 04/26/18 1904    04/11/18 0015  insulin regular (Humulin R) 100 Units in sodium chloride 0.9% 100 mL infusion     Question:  Insulin Rate Adjustment (DO NOT MODIFY ANSWER)  Answer:  [email or web address]    -- IV Continuous 04/10/18 2317

## 2018-05-01 NOTE — PLAN OF CARE
Problem: Physical Therapy Goal  Goal: Physical Therapy Goal  Goals to be met by: 18     Patient will increase functional independence with mobility by performin. Supine to sit with Moderate Assistance.  2. Sit to supine with Moderate Assistance.  3. Rolling to Left and Right with Minimal Assistance.   4. Sit to stand transfer with Moderate Assistance.  5. Bed to chair transfer with Maximum Assistance.  6. Gait  x 5 feet with Minimal Assistance.   7.  Pt to perf and be compliant with LE HEP to improve vascularity and mm strength.           Outcome: Ongoing (interventions implemented as appropriate)  Goals reviewed and remain appropriate. Pt progressing towards goals.    Petra Lake, PT, DPT   2018  [phone]

## 2018-05-01 NOTE — SUBJECTIVE & OBJECTIVE
Interval History: had some nausea overnight with hypoglycemia, seen by nutrition who rec nocturnal feeds only     Continuous Infusions:   insulin (HUMAN R) infusion (adults) 0.8 Units/hr (05/01/18 0918)     Scheduled Meds:   acetaminophen  650 mg Oral TID    albumin human 25%  25 g Intravenous Once    cetirizine  5 mg Oral Daily    epoetin jose miguel (PROCRIT) injection  4,000 Units Intravenous Every Mon, Wed, Fri    escitalopram oxalate  20 mg Oral Daily    famotidine  20 mg Oral QHS    guaiFENesin  600 mg Oral BID    heparin (porcine)  5,000 Units Subcutaneous Q12H    insulin aspart U-100  3 Units Subcutaneous TIDWM    levothyroxine  137 mcg Oral Before breakfast    midodrine  10 mg Oral TID    polyethylene glycol  17 g Oral BID    predniSONE  5 mg Oral Daily    QUEtiapine  50 mg Oral QHS    tacrolimus  4 mg Oral BID     PRN Meds:sodium chloride 0.9%, albuterol-ipratropium 2.5mg-0.5mg/3mL, ALPRAZolam, benzonatate, dextrose 50%, dextrose 50%, glucagon (human recombinant), glucose, glucose, heparin (porcine), insulin aspart U-100, midodrine, ondansetron, oxyCODONE    Review of patient's allergies indicates:   Allergen Reactions    No known drug allergies      Objective:     Vital Signs (Most Recent):  Temp: 100.1 °F (37.8 °C) (05/01/18 0745)  Pulse: (!) 119 (05/01/18 1112)  Resp: 20 (05/01/18 0840)  BP: (!) 96/46 (05/01/18 0745)  SpO2: 96 % (05/01/18 0840) Vital Signs (24h Range):  Temp:  [98.9 °F (37.2 °C)-100.1 °F (37.8 °C)] 100.1 °F (37.8 °C)  Pulse:  [116-126] 119  Resp:  [16-20] 20  SpO2:  [92 %-97 %] 96 %  BP: ()/(45-57) 96/46     No data found.    Body mass index is 25.9 kg/m².      Intake/Output Summary (Last 24 hours) at 05/01/18 1129  Last data filed at 05/01/18 0817   Gross per 24 hour   Intake              770 ml   Output             3100 ml   Net            -2330 ml       Hemodynamic Parameters:           Physical Exam   Constitutional: He appears well-developed. No distress.   Trach    HENT:   Head: Normocephalic and atraumatic.   Eyes: Conjunctivae and EOM are normal.   Cardiovascular: Regular rhythm.  Tachycardia present.    Pulmonary/Chest: He has no wheezes. He has no rales.   L IJ TDC   Abdominal: Soft. He exhibits no distension.   LUQ PEG  Midline incision with staples intact   Neurological: He is alert.   Skin:   Right necrotic toes 1-3  Left great toe necrotic-same       Significant Labs:  CBC:    Recent Labs  Lab 04/29/18  0708 04/30/18  0615 05/01/18  0425   WBC 3.81* 4.74 5.18   RBC 2.36* 2.41* 2.34*   HGB 7.4* 7.5* 7.3*   HCT 24.4* 24.2* 23.7*    299 305   * 100* 101*   MCH 31.4* 31.1* 31.2*   MCHC 30.3* 31.0* 30.8*     BNP:  No results for input(s): BNP in the last 168 hours.    Invalid input(s): BNPTRIAGELBLO  CMP:    Recent Labs  Lab 04/29/18  0708 04/30/18  0615 05/01/18  0426   * 107 305*   CALCIUM 10.1 10.3 9.0   ALBUMIN 2.3* 2.3* 2.1*   PROT 6.5 6.5 6.0   * 130* 134*   K 5.1 5.4* 4.1   CO2 21* 20* 24   CL 94* 95 96   BUN 44* 57* 26*   CREATININE 3.5* 4.1* 2.6*   ALKPHOS 202* 215* 210*   ALT 12 14 15   AST 14 18 20   BILITOT 0.4 0.3 0.5      Coagulation:   No results for input(s): PT, INR, APTT in the last 168 hours.  LDH:  No results for input(s): LDH in the last 72 hours.  Microbiology:  Microbiology Results (last 7 days)     Procedure Component Value Units Date/Time    Culture, Respiratory with Gram Stain [[id number]] Collected:  04/26/18 1329    Order Status:  Completed Specimen:  Respiratory from Tracheal Aspirate Updated:  04/30/18 1253     Respiratory Culture Normal respiratory hank     Gram Stain (Respiratory) <10 epithelial cells per low power field.     Gram Stain (Respiratory) No WBC's     Gram Stain (Respiratory) Rare Gram negative rods    Respiratory Viral Panel by PCR [facility]; Nasal Swab [[id number]] Collected:  04/26/18 1123    Order Status:  Completed Specimen:  Respiratory Updated:  04/27/18 1110     Respiratory Virus Panel, source  Nasal Swab     RVP - Adenovirus Not Detected     Comment: Detects Serotypes B and E. Detection of Serotype C may   be limited. If Adenovirus infection is suspected and a   Not Detected result is returned the sample should be   re-tested for Adenovirus using an independent method  (e.g. [employer] Adenovirus Quantitative Real-Time  PCR test.          Enterovirus Not Detected     Comment: Cross-reactivity has been observed between certain Rhinovirus  strains and the Enterovirus assay.          Human Bocavirus Not Detected     Human Coronavirus Not Detected     Comment: The Human Coronavirus assay detects Human coronavirus types  229E, OC43,NL63 and HKU1.          RVP - Human Metapneumovirus (hMPV) Not Detected     RVP - Influenza A Not Detected     Influenza A - T1X6-58 Not Detected     RVP - Influenza B Not Detected     Parainfluenza Not Detected     Respiratory Syncytial VirusVirus (RSV) A Not Detected     Comment: The Respiratory Syncytial Viral assay detects types A and B,  however it does not distinguish between the two.          RVP - Rhinovirus Not Detected     Comment: Cross-Reactivity has been observed between certain   Rhinovirus strains and the Enterovirus assay.  Target Enriched Mulitplex Polymerase Chain Reaction (TEM-PCR)  allows for the detection of multiple pathogens out of a single  reaction.  This test was developed and its performance   characteristics determined by [employer].  It has not   been cleared or approved by the U.S.Food and Drug Administration.  Results should be used in conjunction with clinical findings,   and should not form the sole basis for a diagnosis or treatment  decision.  TEM-PCR is a licensed technology of [employer].         Narrative:       Receiving Lab:->[facility]    AFB Culture & Smear [[id number]] Collected:  03/14/18 1132    Order Status:  Completed Specimen:  Bronchial Wash from Amniotic Fluid Updated:  04/26/18 0994     AFB Culture & Smear  Culture in progress     AFB Culture & Smear Culture in progress     AFB CULTURE STAIN No acid fast bacilli seen.          I have reviewed all pertinent labs within the past 24 hours.    Estimated Creatinine Clearance: 33.9 mL/min (A) (based on SCr of 2.6 mg/dL (H)).    Diagnostic Results:  I have reviewed and interpreted all pertinent imaging results/findings within the past 24 hours.

## 2018-05-01 NOTE — PROGRESS NOTES
[facility]  Endocrinology  Progress Note    Admit Date: 3/11/2018     Reason for Consult: abnormal TFTs    Surgical Procedure and Date: s/p heart transplant      Diabetes diagnosis year: 5yo (T1DM)     Home Diabetes Medications:    Levemir 15u qHS  Novolog 4u AC     How often checking glucose at home? 4 times daily   BG readings on regimen: 150-200s  Hypoglycemia on the regimen?  Yes, rarely - treats appropriately (light snack, glucose tab)  Missed doses on regimen?  No        Diabetes Complications include:     Hyperglycemia, Hypoglycemia  and Diabetic chronic kidney disease          Complicating diabetes co morbidities:   N/A     HPI:   Patient is a 42 y.o. male with a diagnosis of T1DM, HTN, hypothyroidism, s/p heart transplant.  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 He required serial paracentesis and thoracentesis until he underwent a VATS with pleurX catheter placement on 2018 and removed 18.       Presented to hospital secondary to diarrhea and cough.  Upon initial labs, TSH was decreased (0.101) and free T4 1.33.  Endocrinology consulted to assist in management of these labs.  He was last seen in clinic by Kamala Vázquez NP 2017.   Previous hospitalization, endocrine consulted for same problem.  During that visit, recommended decreasing LT4 to 125mcg daily. Unfortunately, patient was discharged on previous dose of 150mcg daily.     Interval HPI:   Overnight events: Hypoglycemia noted yesterday afternoon. Insulin gtt decreased, and now hyperglycemic this AM. Working with PT. Tube feeds on continuously  Eatin%  Nausea: No  Hypoglycemia and intervention: Yes, d50 x 1  Fever: No  TPN and/or TF: Yes  If yes, type of TF/TPN and rate: Novasource renal @ 30/hr    BP (!) 96/46 (BP Location: Right arm, Patient Position: Lying)   Pulse (!) 125   Temp 100.1 °F (37.8  "°C) (Oral)   Resp 20   Ht 5' 7" (1.702 m)   Wt 75 kg (165 lb 5.5 oz)   SpO2 96%   BMI 25.90 kg/m²       Labs Reviewed and Include      Recent Labs  Lab 05/01/18  0426   *   CALCIUM 9.0   ALBUMIN 2.1*   PROT 6.0   *   K 4.1   CO2 24   CL 96   BUN 26*   CREATININE 2.6*   ALKPHOS 210*   ALT 15   AST 20   BILITOT 0.5     Lab Results   Component Value Date    WBC 5.18 05/01/2018    HGB 7.3 (L) 05/01/2018    HCT 23.7 (L) 05/01/2018     (H) 05/01/2018     05/01/2018     No results for input(s): TSH, FREET4 in the last 168 hours.  Lab Results   Component Value Date    HGBA1C 5.1 04/05/2018       Nutritional status:   Body mass index is 25.9 kg/m².  Lab Results   Component Value Date    ALBUMIN 2.1 (L) 05/01/2018    ALBUMIN 2.3 (L) 04/30/2018    ALBUMIN 2.3 (L) 04/29/2018     Lab Results   Component Value Date    PREALBUMIN 13 (L) 04/08/2018    PREALBUMIN 5 (L) 03/15/2018    PREALBUMIN 18 (L) 03/24/2015       Estimated Creatinine Clearance: 33.9 mL/min (A) (based on SCr of 2.6 mg/dL (H)).    Accu-Checks  Recent Labs      04/30/18   0946  04/30/18   1020  04/30/18   1316  04/30/18   1403  04/30/18   1832  04/30/18   1834  04/30/18   1904  04/30/18   2112  05/01/18   0245  05/01/18   0748   POCTGLUCOSE  91  105  185*  224*  39*  42*  159*  202*  283*  315*       Current Medications and/or Treatments Impacting Glycemic Control  Immunotherapy:  Immunosuppressants         Stop Route Frequency     tacrolimus capsule 4 mg      -- Oral 2 times daily        Steroids:   Hormones     Start     Stop Route Frequency Ordered    05/01/18 0900  predniSONE tablet 5 mg      -- Oral Daily 04/30/18 1513        Pressors:    Autonomic Drugs     Start     Stop Route Frequency Ordered    04/30/18 0705  midodrine tablet 15 mg      -- Oral As needed (PRN) 04/30/18 0707    04/25/18 0900  midodrine tablet 10 mg      -- Oral 3 times daily 04/25/18 0650        Hyperglycemia/Diabetes Medications: Antihyperglycemics     " Start     Stop Route Frequency Ordered    05/01/18 0715  insulin aspart U-100 pen 3 Units      -- SubQ 3 times daily with meals 04/30/18 1920    04/26/18 2000  insulin aspart U-100 pen 0-5 Units      -- SubQ Before meals & nightly PRN 04/26/18 1904    04/11/18 0015  insulin regular (Humulin R) 100 Units in sodium chloride 0.9% 100 mL infusion     Question:  Insulin Rate Adjustment (DO NOT MODIFY ANSWER)  Answer:  [email or web address]    -- IV Continuous 04/10/18 2313          ASSESSMENT and PLAN    * Acute respiratory failure with hypoxia    Per primary  S/p trach.  Practicing with speech valve.  Now back on diet.        On enteral nutrition    TF at goal May increase insulin needs          Current chronic use of systemic steroids    Can increase insulin requirement        Type 1 diabetes mellitus with stage 3 chronic kidney disease    BG goal 140-180,       Increase transition gtt to 0.8 u/hr  Decrease novolog 3 units with meals  Low dose correction  POC glucose ac/hs/0200    Notify endocrine for changes in nutrition status (diet, TF, TPN)    Pt is T1DM, do not suspend insulin >1 hr   If TF held, decrease insulin rate to 0.4u/hr   (known to have controlled BG on basal needs of lev 5 daily/ 0.2u/hr during this hospitalization)      Discharge Recommendations:  Will need scheduled novolog q4 or regular q6 for TF and will need basal insulin for T1DM         Hypothyroidism due to Hashimoto's thyroiditis    continue iv levothyroxine 75mcg daily while inpt and on continuous TF   Switch to LT4 137mcg daily PO/G tube upon discharge/once TF changed to nocturnal regimen.   Separate LT4 1 hr from TF to ensure proper absorption           Acute renal failure with acute tubular necrosis superimposed on stage 3 chronic kidney disease    Avoid insulin stacking          Heart transplanted    Per transplant  Avoid hypoglycemia  On PDN and prograf - could lead to prandial  elevations and insulin resistance.            Corey Johnson MD  Endocrinology  [facility]

## 2018-05-01 NOTE — PLAN OF CARE
Problem: Occupational Therapy Goal  Goal: Occupational Therapy Goal  Goals to be met by: 5/8/18    Pt will follow 75% of motor commands with <2 verbal cues for repetition of task to maximize engagement in grooming task.--MET  Pt will complete feeding with mod A.   Pt will complete supine with HOB slightly elevated to seated at EOB with mod A in prep for seated grooming task.  Pt will engage in BUE exercises in orders to increase strength to 3+/5 in BUE's to increase engagement in seated grooming task  Pt will sit at EOB for approx 10 minutes with mod A and unilateral UE support to complete grooming task  Pt will complete sit>stand from EOB with bed in lowest position with mod A in prep for [place] [facility]      Outcome: Ongoing (interventions implemented as appropriate)  con't with plans  Delia To, LILYR

## 2018-05-01 NOTE — NURSING
Reported to Dr. Walsh pt's blood sugar was 43. Insulin infusion was stopped and 25g D50 administered. Will restart tube feeds in 1-2 hrs if pt does not have N/V. Will recheck blood sugar.

## 2018-05-01 NOTE — PROGRESS NOTES
[facility]  Physical Medicine & Rehab  Progress Note    Patient Name: Lv Crocker  MRN: [id number]  Admission Date: 3/11/2018  Length of Stay: 50 days  Attending Physician: Alexandr Hernández MD    Subjective:     Principal Problem:Acute respiratory failure with hypoxia    Hospital Course:   3/24/18:  Evaluated by PT and OT.  Bed mobility and transfers deferred 2/2 intubation and CRRT.   3/26/18:  Participated with PT.  Bed mobility totalA-dependent.  EOB totalA.  3/28/18:  Participated with PT and OT.  Bed mobility totalA-dependent.  ADLs totalA.   4/16/18:  Participated with PT and OT.  Bed mobility max-totalA/dependent.  Sit to stand totalA and transfers totalA.  EOB mod-totalA.  UBD totalA and LBD totalA.  4/17/18:  Participated with PT and SLP.  Bed mobility total-dependent.  EOB x 15 minutes mod-maxA.   4/18/18:  Participated with OT.  Bed mobility totalA.  EOB x 15 minutes totalA.    4/19/18:  Participated with PT, OT, and SLP.  Found to have cognitive-linguistic impairment and dysphonia.  Remains NPO.  Bed mobility total/dependent.  EOB x 24 minutes (static sit x ~10 seconds + ~5 seconds SV) mod-maxA.  No sit to stand, transfers, or gait.  ADLs totalA.   4/22/18:  Participated with PT and OT.  Bed mobility totalA.  EOB maxA.  Sit to stand max-totalA and transfers max-totalA.  LBD maxA.  4/23/18:  No PT or OT 2/2 HD.   4/24/18:  Participated with PT.  Bed mobility maxA.  Sit to stand and transfers maxA.  EOB maxA.   4/25/18:  Participated with OT.  Bed mobility totalA.  Bed to medi chair transfer dependent (totalA x 2).  EOB x 20 minutes maxA.  ADLs totalA.    4/26/18:  Passed MBSS.  SLP recommendation: regular diet and thin liquids (with a chin tuck in isolation of food).  Participated with PT and OT.  Bed mobility totalA.  Declined OOB activty with therapy 2/2 waiting on family to eat and wanted to be in bed for meal.  Medi chair in afternoon.  4/27/18:  Participated with PT, OT,  and SLP.  Bed mobility totalA-dependent.  Sit to stand totalA-dependent.  EOB SBA-maxA.  Standing balance totalA.   4/28/18:  Participated with PT.  Bed mobility totalA-dependent.    4/30/18:  Participated with PT and OT.  Bed mobility totalA (maxA x 2).  Declined EOB 2/2 pain and fatigue from HD.  UBD totalA and LBD totalA.      Interval History 5/1/2018:  Patient is seen for follow-up rehab evaluation and recommendations: No acute events over night.  Sitting in medichair.  Participated with therapy.  Barriers for discharge/rehab admission: HD chair     HPI, Past Medical, Family, and Social History remains the same as documented in the initial encounter.    Scheduled Medications:    acetaminophen  650 mg Oral TID    albumin human 25%  25 g Intravenous Once    cetirizine  5 mg Oral Daily    epoetin jose miguel (PROCRIT) injection  4,000 Units Intravenous Every Mon, Wed, Fri    escitalopram oxalate  20 mg Oral Daily    famotidine  20 mg Oral QHS    guaiFENesin  600 mg Oral BID    heparin (porcine)  5,000 Units Subcutaneous Q12H    insulin aspart U-100  3 Units Subcutaneous TIDWM    levothyroxine  137 mcg Oral Before breakfast    midodrine  10 mg Oral TID    polyethylene glycol  17 g Oral BID    predniSONE  5 mg Oral Daily    QUEtiapine  50 mg Oral QHS    tacrolimus  4 mg Oral BID     PRN Medications: sodium chloride 0.9%, albuterol-ipratropium 2.5mg-0.5mg/3mL, ALPRAZolam, benzonatate, dextrose 50%, dextrose 50%, glucagon (human recombinant), glucose, glucose, heparin (porcine), insulin aspart U-100, midodrine, ondansetron, oxyCODONE    Review of Systems   Constitutional: Positive for activity change. Negative for chills, fatigue and fever.   HENT: Negative for drooling, hearing loss, trouble swallowing and voice change.    Eyes: Negative for pain and visual disturbance.   Respiratory: Negative for cough and wheezing.         + trach   Cardiovascular: Negative for chest pain and palpitations.    Gastrointestinal: Negative for abdominal pain, nausea and vomiting.        + PEG   Genitourinary: Negative for difficulty urinating and flank pain.   Musculoskeletal: Positive for arthralgias, gait problem and myalgias. Negative for back pain and neck pain.   Skin: Positive for color change and wound. Negative for rash.   Allergic/Immunologic: Positive for immunocompromised state.   Neurological: Positive for weakness. Negative for dizziness, numbness and headaches.   Psychiatric/Behavioral: Positive for sleep disturbance (improving). Negative for agitation and hallucinations. The patient is not nervous/anxious.      Objective:     Vital Signs (Most Recent):  Temp: 100.1 °F (37.8 °C) (05/01/18 0745)  Pulse: (!) 125 (05/01/18 0840)  Resp: 20 (05/01/18 0840)  BP: (!) 96/46 (05/01/18 0745)  SpO2: 96 % (05/01/18 0840)    Vital Signs (24h Range):  Temp:  [98.9 °F (37.2 °C)-100.1 °F (37.8 °C)] 100.1 °F (37.8 °C)  Pulse:  [115-126] 125  Resp:  [16-20] 20  SpO2:  [92 %-97 %] 96 %  BP: ()/(45-57) 96/46     Physical Exam   Constitutional: He is oriented to person, place, and time. He appears well-developed. No distress.   HENT:   Head: Normocephalic and atraumatic.   Right Ear: External ear normal.   Left Ear: External ear normal.   Nose: Nose normal.   Eyes: Right eye exhibits no discharge. Left eye exhibits no discharge. No scleral icterus.   Neck: Neck supple.   Trach intact.    Cardiovascular: Normal rate, regular rhythm and intact distal pulses.    Pulmonary/Chest: Effort normal. No respiratory distress. He has no wheezes.   Abdominal: Soft. He exhibits no distension. There is no tenderness.   PEG intact, incision LETICIA, CDI   Musculoskeletal: He exhibits no edema.        Right shoulder: He exhibits decreased range of motion and tenderness.   R foot: R great toe and R 3rd toe with necrotic tissue  L foot: small amount of necrotic tissue to L great toe  Overall deconditioning   Neurological: He is alert and oriented  "to person, place, and time.   -  Mental Status:  AAOx3.  Follows commands.  Answers correct age and .  Recent and remote memory intact.  -  Speech and language:  no aphasia or dysarthria.    -  Motor:  RUE: 3-/5, 3/5 .  LUE: 2+/5, 3/5 .  RLE: Proximal 2/5, distal 3+/5, DF 4-/5, PF 4-/5.  LLE: Proximal 2+/5, distal 4-/5, DF 3-/5, PF 4-/5.  -  Sensory:  Intact to light touch and pin prick.   Skin: Skin is warm and dry. No rash noted.   Psychiatric: His behavior is normal. Thought content normal. His affect is blunt.   Vitals reviewed.    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 Ischemic changes to right second toe and left hallux  -  Wound care following  -  Podiatry following         Severe malnutrition    -  S/p PEG placement on 18  -  On continuous TF        Type 1 diabetes mellitus with stage 3 chronic kidney disease    -  Endocrine following  -  On insulin gtt        Acute renal failure with acute tubular necrosis superimposed on stage 3 chronic kidney disease    -  Nephrology following "anuric CACHORRO; likely ischemic ATN 2/2 prolonged pre-renal state (diarrhea) in setting of prograf renal vasoconstriction; cannot r/o septic ATN or Prograf toxicity"  -  On HD  -  S/p perm-a-cath placement on 18        Heart transplanted    -  S/p OHTX 2014 with early post-op course complicated by hemorrhagic tamponade s/p  washout, delayed closure, pericardial window with subsequent a-fib with RVR and CACHORRO and late course complicated by ABMR (in 2015 s/p rituxan, PLEX, and IVIG), C.diff/CMV reactivation, CKD, and restrictive cardiomyopathy with subsequent " chronic/recurrent pleural effusions and ascites previously requiring monthly paracentesis and thoracentesis until he underwent VATS with pleurX catheter placement on 1/11/2018 with removal on 2/7/2018        Debility    -  2/2 prolonged and acute hospital course  -  (I) ADLs and mobility prior to admission, limited by fatigue/endurance  Recommendations  -  Encourage mobility, OOB in chair, and early ambulation as appropriate  -  PT/OT evaluate and treat  -  Pain management  -  Monitor for and prevent skin breakdown and pressure ulcers  · Early mobility, repositioning/weight shifting every 20-30 minutes when sitting, turn patient every 2 hours, proper mattress/overlay and chair cushioning, pressure relief/heel protector boots  -  DVT prophylaxis:  MARK, SCD        Anxiety    -  Anxiety and sleeping difficulty   -  Psych consulted--> increased Lexapro to 20 mg, started Seroquel--> stopped xanax and nortriptyline 2/2 hallucinations        Patient approved for [facility].  Insurance and HD chair pending admission.     Pinky Garcia, RENETTA  Department of Physical Medicine & Rehab   [facility]

## 2018-05-01 NOTE — PT/OT/SLP PROGRESS
"Physical Therapy Treatment    Patient Name:  Lv Crocker   MRN:  [id number]    Recommendations:     Discharge Recommendations:  rehabilitation facility   Discharge Equipment Recommendations:  (TBD)   Barriers to discharge: Inaccessible home and Decreased caregiver support    Assessment:     Lv Crocker is a 42 y.o. male admitted with a medical diagnosis of Acute respiratory failure with hypoxia.  He presents with the following impairments/functional limitations:  weakness, impaired functional mobilty, gait instability, impaired endurance, impaired balance, impaired self care skills, impaired cardiopulmonary response to activity, decreased upper extremity function, decreased lower extremity function, decreased safety awareness, pain, impaired skin, decreased ROM, impaired muscle length, impaired joint extensibility, decreased coordination, impaired coordination. Continuing to require increased assist to complete functional mobility. Able to transfer to medi-chair this date but with totalAx2 needed for stand pivot. Pt would continue to benefit from skilled acute PT in order to address current deficits and progress functional mobility.     Rehab Prognosis:  good; patient would benefit from acute skilled PT services to address these deficits and reach maximum level of function.      Recent Surgery: Procedure(s) (LRB):  INSERTION-CATHETER-PERM-A-CATH (Left)  INSERTION-TUBE-GASTROSTOMY (N/A) 27 Days Post-Op    Plan:     During this hospitalization, patient to be seen 5 x/week to address the above listed problems via gait training, therapeutic activities, therapeutic exercises, neuromuscular re-education  · Plan of Care Expires:  05/06/18   Plan of Care Reviewed with: patient, spouse    Subjective     Communicated with RN prior to session.  Patient found supine upon PT entry to room, agreeable to treatment.      Chief Complaint: nausea   Patient comments/goals: "I had a rough night." Pt reporting that " he was having diarrhea and vomiting last night.   Pain/Comfort:  · Pain Rating 1:  (did not rate)  · Location - Side 1: Bilateral  · Location 1: foot (with mobility )  · Pain Addressed 1: Reposition, Distraction    Patients cultural, spiritual, Anglican conflicts given the current situation: none noted     Objective:     Patient found with: telemetry, PEG Tube, peripheral IV, tracheostomy     General Precautions: Standard, fall, aspiration   Orthopedic Precautions:N/A   Braces: N/A     Functional Mobility:  · Bed Mobility:     · Scooting: total assistance of 2 persons with drawsheet to scoot posteriorly in medi-chair  · Supine to Sit: maximal assistance of 2 persons  · Sit to Supine: maximal assistance of 2 persons  · Transfers:     · Sit to Stand:  maximal assistance of 2 persons with no AD x2 reps from EOB, x3 reps from medi-chair for re-positioning   · Bed to Chair: total assistance of 2 persons with  no AD  using  Stand Pivot to Medi-chair  · Balance:   · sitting: close SBA to Carmina    · standing: max-totalAx2      AM-PAC 6 CLICK MOBILITY  Turning over in bed (including adjusting bedclothes, sheets and blankets)?: 2  Sitting down on and standing up from a chair with arms (e.g., wheelchair, bedside commode, etc.): 2  Moving from lying on back to sitting on the side of the bed?: 2  Moving to and from a bed to a chair (including a wheelchair)?: 2  Need to walk in hospital room?: 1  Climbing 3-5 steps with a railing?: 1  Total Score: 10       Therapeutic Activities and Exercises:   Performed sit<>stand from EOB with maxAx2; unable to weight-shifting appropriately to achieve standing balance, thus returned to sit EOB. Performed sit>stand with maxAx2 and stand pivot to medi-chair with totalAx2. Performed 3 additional sit<>stand transfers from medi-chair in order to re-position. Unable to fully position appropriately in medi-chair, thus performed posterior scooting with totalAx2 and drawsheet. Pt positioned in upright  sitting with seat belt donned and BLE on foot rests. Pt's cousin instructed on use of medi-chair controls to assist pt with positioning. Pt encouraged to sit up until after lunch. RN notified of pt position and instructed on supine drawsheet transfer to return to bed.     Patient left up in chair with all lines intact, call button in reach, RN notified and pt's cousin present..    GOALS:    Physical Therapy Goals        Problem: Physical Therapy Goal    Goal Priority Disciplines Outcome Goal Variances Interventions   Physical Therapy Goal     PT/OT, PT Ongoing (interventions implemented as appropriate)     Description:  Goals to be met by: 18     Patient will increase functional independence with mobility by performin. Supine to sit with Moderate Assistance.  2. Sit to supine with Moderate Assistance.  3. Rolling to Left and Right with Minimal Assistance.   4. Sit to stand transfer with Moderate Assistance.  5. Bed to chair transfer with Maximum Assistance.  6. Gait  x 5 feet with Minimal Assistance.   7.  Pt to perf and be compliant with LE HEP to improve vascularity and mm strength.                            Time Tracking:     PT Received On: 18  PT Start Time: 859     PT Stop Time: 951  PT Total Time (min): 52 min     Billable Minutes: Therapeutic Activity 15 and Neuromuscular Re-education 25   (co-tx with OT)    Treatment Type: Treatment  PT/PTA: PT     PTA Visit Number: 0     Petra Lake PT, DPT   2018  [phone]

## 2018-05-01 NOTE — PLAN OF CARE
Problem: SLP Goal  Goal: SLP Goal  Updated Speech Language Pathology Goals  Goals expected to be met by 5/4  1. Pt will tolerate a regular diet and thin liquids with no overt s/s of aspiration.   2. Pt will demonstrate/verbalize 3 safe swallowing strategies with min cues.   3. Educate Pt on aspiration precautions and S/S aspiration  4. Pt and family will verbalize/demosntrate understanding of PMSV precautions and safety awareness with no cues.  5. Patient will complete dysphagia therapy exercises x10 each with min cues.     Speech Language Pathology Goals  Goals expected to be met by 4/27/18  1.  Pt will tolerate PMSV for 30 minutes w/ no change in respiratory status and fair voicing produced. -met  2.  Pt will participate in ongoing swallow assessment. -met  3. Pt will name up to 12 items/minute in a concrete category, 90% of attempts, Supervision, to improve cognitive organization. -discontinued  4. Pt will recall 3 related items post 3 minute filled delay, 90% of attempts, Supervision, to improve STM.-discontinued  5. Pt will answer m/u YNQ with 90% accuracy, Supervision, to improve higher-level comprehension. --discontinued  6. Pt will complete further assessment of reading/writing skills. -discontinued  7. Educate Pt on aspiration precautions and S/S aspiration. -ongoing  8. Educate Pt and family on PMSV precautions and safety awareness. -met; reinforcment to continue  Goals expected to be met by 4/20/18  1.  Pt will tolerate PMSV for 30 minutes w/ no change in respiratory status and fair voicing produced.  2.  Pt will complete further evaluation of cognitive-linguistic communication skills to determine the need for additional goals. Met 4/19  3. Pt will name up to 12 items/minute in a concrete category, 90% of attempts, Supervision, to improve cognitive organization  4. Pt will recall 3 related items post 3 minute filled delay, 90% of attempts, Supervision, to improve STM   5. Pt will answer m/u YNQ with 90%  accuracy, Supervision, to improve higher-level comprehension   6. Pt will complete further assessment of reading/writing skills  7. Pending MD clearance, Pt will complete further assessment of swallow fx   8. Educate Pt and family on PMSV precautions and safety awareness    Goals expected to be met by 4/13/18   1.  Pt will tolerate PMSV for 3 min w/ no change in respiratory status and fair voicing produced. Met   2.  Pt will complete further evaluation of cognitive-linguistic communication skills to determine the need for additional goals.              Outcome: Ongoing (interventions implemented as appropriate)  Pt making steady progress, tolerating current diet well. YAMILET Bennett, CCC/SLP  5/1/2018

## 2018-05-01 NOTE — ASSESSMENT & PLAN NOTE
BG goal 140-180,       Increase transition gtt to 0.8 u/hr  Decrease novolog 3 units with meals  Low dose correction  POC glucose ac/hs/0200    Notify endocrine for changes in nutrition status (diet, TF, TPN)    Pt is T1DM, do not suspend insulin >1 hr   If TF held, decrease insulin rate to 0.4u/hr   (known to have controlled BG on basal needs of lev 5 daily/ 0.2u/hr during this hospitalization)      Discharge Recommendations:  Will need scheduled novolog q4 or regular q6 for TF and will need basal insulin for T1DM

## 2018-05-01 NOTE — PT/OT/SLP PROGRESS
"Speech Language Pathology Treatment    Patient Name:  Lv Crocker   MRN:  [id number]  Admitting Diagnosis: Acute respiratory failure with hypoxia    Recommendations:                 General Recommendations:  Dysphagia therapy, Speech/language therapy, Cognitive-linguistic therapy and One Way Speaking Valve  Diet recommendations:  Regular, Thin (with a chin tuck in isolation of food)   Aspiration Precautions:   · CHIN TUCK to chest for all swallows  · Liquids in isolation of food  · 1 bite/sip at a time and slow rate (to allow increased time to complete spontaneous multiple swallows),   · Feed only when awake/alert,   · Frequent oral care,   · HOB to 90 degrees,   · Meds whole buried in puree,   · Monitor for s/s of aspiration,   · Remain upright 30 minutes post meal,   · Small bites/sips and   · Strict aspiration precautions   · Continue to monitor for signs and symptoms of aspiration and discontinue oral feeding should you notice any of the following: watery eyes, reddened facial area, wet vocal quality, increased work of breathing, change in respiratory status, increased congestion, coughing, fever, etc.  General Precautions: Standard, fall, aspiration  Communication strategies:  One way speaking valve    Subjective     Pt seen on 2nd attempt, alert and cooperative      Pain/Comfort:  · Pain Rating 1: 0/10  · Pain Rating Post-Intervention 1: 0/10    Objective:     Has the patient been evaluated by SLP for swallowing?   Yes  Keep patient NPO? No   Current Respiratory Status: speaking valve, trach cuffless      Pt reports increased appetite though vomiting episode yesterday 2/2 "overeating".  He denied overt s/s aspiration.  Tray at bedside though pt declined po 2/2 "taking a break".  He was able to recall swallow precautions of upright, chin tuck, small sips, and wearing valve during po indptly.  All precs reviewed.  Pt reports compliance with all following education.  He was also able to recall PMV " precautions including removal for sleep indptly.  Vocal intensity and quality were adequate t/o conversation.  Pt was unable to recall dysphagia excs.  Handout provided and excs demonstrated.  Pt able to complete all excs including abdelrahman, effortful swallow, hard glottal /k/ and /g/ and falsetto /i/ with good ability.  Handout left for reference.  Pt and visitor reported no additional questions.      Assessment:     Lv Crocker is a 42 y.o. male with an SLP diagnosis of Dysphagia and One Way Speaking Valve Training.     Goals:    SLP Goals        Problem: SLP Goal    Goal Priority Disciplines Outcome   SLP Goal     SLP Ongoing (interventions implemented as appropriate)   Description:  Updated Speech Language Pathology Goals  Goals expected to be met by 5/4  1. Pt will tolerate a regular diet and thin liquids with no overt s/s of aspiration.   2. Pt will demonstrate/verbalize 3 safe swallowing strategies with min cues.   3. Educate Pt on aspiration precautions and S/S aspiration  4. Pt and family will verbalize/demosntrate understanding of PMSV precautions and safety awareness with no cues.  5. Patient will complete dysphagia therapy exercises x10 each with min cues.     Speech Language Pathology Goals  Goals expected to be met by 4/27/18  1.  Pt will tolerate PMSV for 30 minutes w/ no change in respiratory status and fair voicing produced. -met  2.  Pt will participate in ongoing swallow assessment. -met  3. Pt will name up to 12 items/minute in a concrete category, 90% of attempts, Supervision, to improve cognitive organization. -discontinued  4. Pt will recall 3 related items post 3 minute filled delay, 90% of attempts, Supervision, to improve STM.-discontinued  5. Pt will answer m/u YNQ with 90% accuracy, Supervision, to improve higher-level comprehension. --discontinued  6. Pt will complete further assessment of reading/writing skills. -discontinued  7. Educate Pt on aspiration precautions and S/S  aspiration. -ongoing  8. Educate Pt and family on PMSV precautions and safety awareness. -met; reinforcment to continue  Goals expected to be met by 4/20/18  1.  Pt will tolerate PMSV for 30 minutes w/ no change in respiratory status and fair voicing produced.  2.  Pt will complete further evaluation of cognitive-linguistic communication skills to determine the need for additional goals. Met 4/19  3. Pt will name up to 12 items/minute in a concrete category, 90% of attempts, Supervision, to improve cognitive organization  4. Pt will recall 3 related items post 3 minute filled delay, 90% of attempts, Supervision, to improve STM   5. Pt will answer m/u YNQ with 90% accuracy, Supervision, to improve higher-level comprehension   6. Pt will complete further assessment of reading/writing skills  7. Pending MD clearance, Pt will complete further assessment of swallow fx   8. Educate Pt and family on PMSV precautions and safety awareness    Goals expected to be met by 4/13/18   1.  Pt will tolerate PMSV for 3 min w/ no change in respiratory status and fair voicing produced. Met   2.  Pt will complete further evaluation of cognitive-linguistic communication skills to determine the need for additional goals.                               Plan:     · Patient to be seen:  5 x/week   · Plan of Care expires:  05/05/18  · Plan of Care reviewed with:  patient   · SLP Follow-Up:  Yes       Discharge recommendations:  rehabilitation facility   Barriers to Discharge:  Level of Skilled Assistance Needed      Time Tracking:     SLP Treatment Date:   05/01/18  Speech Start Time:  1428  Speech Stop Time:  1447     Speech Total Time (min):  19 min    Billable Minutes: Treatment Swallowing Dysfunction 10 and Seld Care/Home Management Training 9    YAMILET Bennett, CCC-SLP  05/01/2018

## 2018-05-01 NOTE — PT/OT/SLP PROGRESS
Occupational Therapy   Treatment    Name: Lv Crocker  MRN: [id number]  Admitting Diagnosis:  Acute respiratory failure with hypoxia  27 Days Post-Op    Recommendations:     Discharge Recommendations: rehabilitation facility  Discharge Equipment Recommendations:   (tbd)  Barriers to discharge:  Inaccessible home environment, Decreased caregiver support    Subjective     Communicated with: nsg prior to session. Cc with PT  Pain/Comfort:  · Pain Rating 1:  (says his neck is a little sore)    Patients cultural, spiritual, Druze conflicts given the current situation: none    Objective:     Patient found with: telemetry, PEG Tube, peripheral IV, tracheostomy    General Precautions: Standard, fall, aspiration   Orthopedic Precautions:N/A   Braces:       Occupational Performance:    Bed Mobility:    - sup to sit with max assist x 2       Functional Mobility/Transfers:  · Static Sitting balance EOB with CGA to spvn  · Functional Mobility: sit to stand with max assist x 2, x 5 reps  · Stand pivot transfer with total assist x 2  · Scooting back in chair with total assist with attempted sit to stand to scoot back  · Scooting with head of chair flat with total assist x 2  · Left up in medi chair with seatbelt on, call bell in hand, family bedside, nsg aware    Activities of Daily Living:  · UE dress with gown with total assist sitting EOB, attempts to assist with UE's  · Total assist socks    Patient left up in chair with all lines intact, call button in reach and ffamily present    AMPA 6 Click:  [facility] Total Score: 6    Treatment & Education:  Pt performed above activities in prep for continued ADL's and strength, functional mobility and to increased activity tolerance. Educated pt and family on use of medi chair controls for position changes while sitting, importance of OOB as much as tolerated, POC.  Education:    Assessment:     Lv Crocker is a 42 y.o. male with a medical diagnosis of Acute  respiratory failure with hypoxia.  He presents with more fatigue today after n/v overnight, but attempts to assist with activity as able.  Performance deficits affecting function are weakness, impaired endurance, impaired self care skills, impaired functional mobilty, impaired balance, gait instability, decreased upper extremity function, decreased lower extremity function, pain, impaired cardiopulmonary response to activity.      Rehab Prognosis:  good; patient would benefit from acute skilled OT services to address these deficits and reach maximum level of function.       Plan:     Patient to be seen 5 x/week to address the above listed problems via self-care/home management, therapeutic activities, therapeutic exercises, neuromuscular re-education  · Plan of Care Expires: 05/06/18  · Plan of Care Reviewed with: patient    This Plan of care has been discussed with the patient who was involved in its development and understands and is in agreement with the identified goals and treatment plan    GOALS:    Occupational Therapy Goals        Problem: Occupational Therapy Goal    Goal Priority Disciplines Outcome Interventions   Occupational Therapy Goal     OT, PT/OT Ongoing (interventions implemented as appropriate)    Description:  Goals to be met by: 5/8/18    Pt will follow 75% of motor commands with <2 verbal cues for repetition of task to maximize engagement in grooming task.--MET  Pt will complete feeding with mod A.   Pt will complete supine with HOB slightly elevated to seated at EOB with mod A in prep for seated grooming task.  Pt will engage in BUE exercises in orders to increase strength to 3+/5 in BUE's to increase engagement in seated grooming task  Pt will sit at EOB for approx 10 minutes with mod A and unilateral UE support to complete grooming task  Pt will complete sit>stand from EOB with bed in lowest position with mod A in prep for transfer to [facility]                       Time Tracking:     OT Date of  Treatment: 05/01/18  OT Start Time: 0910  OT Stop Time: 0949  OT Total Time (min): 39 min    Billable Minutes:Self Care/Home Management 8 min  Therapeutic Activity 20 min  Therapeutic Exercise 11 min    Delia To OT  5/1/2018

## 2018-05-01 NOTE — PLAN OF CARE
Problem: Patient Care Overview  Goal: Plan of Care Review  Outcome: Ongoing (interventions implemented as appropriate)  Pt aao x 4. Pt currently in bed with bed in lowest/locked position. Call light in reach. Free from falls/injury this shift. Encouraged to call for assistance when needed. Family at bedside. Pt turned often in bed, heel boots on. Wound care by to see patient for skin breakdown on bottom, clear ointment applied. Pt worked with Therapy today. BG 100s-300s. Insulin gtt @ 0.8 units/hr. Nocturnal TF from 8p-8a. Pt eating about 25% of meals. No c/o pain. HD planned for tomorrow.     Will continue to monitor, assess, and adjust care as needed.

## 2018-05-01 NOTE — SUBJECTIVE & OBJECTIVE
Interval History 5/1/2018:  Patient is seen for follow-up rehab evaluation and recommendations: No acute events over night.  Sitting in medichair.  Participated with therapy.  Barriers for discharge/rehab admission: HD chair     HPI, Past Medical, Family, and Social History remains the same as documented in the initial encounter.    Scheduled Medications:    acetaminophen  650 mg Oral TID    albumin human 25%  25 g Intravenous Once    cetirizine  5 mg Oral Daily    epoetin jose miguel (PROCRIT) injection  4,000 Units Intravenous Every Mon, Wed, Fri    escitalopram oxalate  20 mg Oral Daily    famotidine  20 mg Oral QHS    guaiFENesin  600 mg Oral BID    heparin (porcine)  5,000 Units Subcutaneous Q12H    insulin aspart U-100  3 Units Subcutaneous TIDWM    levothyroxine  137 mcg Oral Before breakfast    midodrine  10 mg Oral TID    polyethylene glycol  17 g Oral BID    predniSONE  5 mg Oral Daily    QUEtiapine  50 mg Oral QHS    tacrolimus  4 mg Oral BID     PRN Medications: sodium chloride 0.9%, albuterol-ipratropium 2.5mg-0.5mg/3mL, ALPRAZolam, benzonatate, dextrose 50%, dextrose 50%, glucagon (human recombinant), glucose, glucose, heparin (porcine), insulin aspart U-100, midodrine, ondansetron, oxyCODONE    Review of Systems   Constitutional: Positive for activity change. Negative for chills, fatigue and fever.   HENT: Negative for drooling, hearing loss, trouble swallowing and voice change.    Eyes: Negative for pain and visual disturbance.   Respiratory: Negative for cough and wheezing.         + trach   Cardiovascular: Negative for chest pain and palpitations.   Gastrointestinal: Negative for abdominal pain, nausea and vomiting.        + PEG   Genitourinary: Negative for difficulty urinating and flank pain.   Musculoskeletal: Positive for arthralgias, gait problem and myalgias. Negative for back pain and neck pain.   Skin: Positive for color change and wound. Negative for rash.   Allergic/Immunologic:  Positive for immunocompromised state.   Neurological: Positive for weakness. Negative for dizziness, numbness and headaches.   Psychiatric/Behavioral: Positive for sleep disturbance (improving). Negative for agitation and hallucinations. The patient is not nervous/anxious.      Objective:     Vital Signs (Most Recent):  Temp: 100.1 °F (37.8 °C) (18 0745)  Pulse: (!) 125 (18 0840)  Resp: 20 (18 0840)  BP: (!) 96/46 (18 0745)  SpO2: 96 % (18 0840)    Vital Signs (24h Range):  Temp:  [98.9 °F (37.2 °C)-100.1 °F (37.8 °C)] 100.1 °F (37.8 °C)  Pulse:  [115-126] 125  Resp:  [16-20] 20  SpO2:  [92 %-97 %] 96 %  BP: ()/(45-57) 96/46     Physical Exam   Constitutional: He is oriented to person, place, and time. He appears well-developed. No distress.   HENT:   Head: Normocephalic and atraumatic.   Right Ear: External ear normal.   Left Ear: External ear normal.   Nose: Nose normal.   Eyes: Right eye exhibits no discharge. Left eye exhibits no discharge. No scleral icterus.   Neck: Neck supple.   Trach intact.    Cardiovascular: Normal rate, regular rhythm and intact distal pulses.    Pulmonary/Chest: Effort normal. No respiratory distress. He has no wheezes.   Abdominal: Soft. He exhibits no distension. There is no tenderness.   PEG intact, incision LETICIA, CDI   Musculoskeletal: He exhibits no edema.        Right shoulder: He exhibits decreased range of motion and tenderness.   R foot: R great toe and R 3rd toe with necrotic tissue  L foot: small amount of necrotic tissue to L great toe  Overall deconditioning   Neurological: He is alert and oriented to person, place, and time.   -  Mental Status:  AAOx3.  Follows commands.  Answers correct age and .  Recent and remote memory intact.  -  Speech and language:  no aphasia or dysarthria.    -  Motor:  RUE: 3-/5, 3/5 .  LUE: 2+/5, 3/5 .  RLE: Proximal 2/5, distal 3+/5, DF 4-/5, PF 4-/5.  LLE: Proximal 2+/5, distal 4-/5, DF 3-/5, PF  4-/5.  -  Sensory:  Intact to light touch and pin prick.   Skin: Skin is warm and dry. No rash noted.   Psychiatric: His behavior is normal. Thought content normal. His affect is blunt.   Vitals reviewed.    Diagnostic Results:   Labs: Reviewed  X-Ray: Reviewed  US: Reviewed  CT: Reviewed  NEUROLOGICAL EXAMINATION:     MENTAL STATUS   Oriented to person, place, and time.

## 2018-05-02 NOTE — ASSESSMENT & PLAN NOTE
- TTE 4/24/18 showed normal graft function but has known history of restrictive CM post transplant.  - Continue Prednisone 5mg daily and increase Tacrolimus to 4/4 with plan to adjust as needed for goal 6-10  - Tacro level pending this AM   - resume low dose cellcept today

## 2018-05-02 NOTE — PT/OT/SLP PROGRESS
Occupational Therapy   Treatment    Name: Lv Crocker  MRN: [id number]  Admitting Diagnosis:  Acute respiratory failure with hypoxia  28 Days Post-Op    Recommendations:     Discharge Recommendations: rehabilitation facility  Discharge Equipment Recommendations:   (TBD at next level of care)  Barriers to discharge:  Inaccessible home environment, Decreased caregiver support    Subjective     Communicated with: RN prior to session. Pt attempted to be seen in AM however pt recently returning from Dialysis and requesting to rest. OT/PT returned in PM for afternoon session. Pt's cousin present during therapy session.     Pain/Comfort:  Pain Rating 1:  (Pt did not state numerical value )  Location - Side 1: Bilateral  Location - Orientation 1: distal  Location 1: foot (L>R )  Pain Addressed 1: Reposition, Distraction, Cessation of Activity    Patients cultural, spiritual, Scientology conflicts given the current situation: none stated     Objective:     Patient found with: telemetry, PEG Tube, peripheral IV, tracheostomy    General Precautions: Standard, fall   Orthopedic Precautions:N/A   Braces: N/A     Occupational Performance:    Bed Mobility:    · Patient completed Rolling/Turning to Left with  maximal assistance  · Patient completed Scooting/Bridging with total assistance and 2 persons to drawsheet towards HOB. Total A for lateral side scooting towards HOB to the L.   · Patient completed Supine to Sit with maximal assistance for B LE management and trunk elevation.   · Patient completed Sit to Supine with total assistance and 2 persons for B LE management and controlled trunk descent.     Functional Mobility/Transfers:  · Patient completed Sit <> Stand Transfer x 2 trials from EOB with maximal assistance and of 2 persons  with  no assistive device. Pt required verbal cues for upright posture and proper hand placement. 2nd trial pt stood for ~25-30 seconds with max A x 2 persons for standing balance.    · Functional Mobility: Pt unable to take steps at this time due to impaired B LE function.    Activities of Daily Living:  · Total A to doff B  socks     Patient left HOB elevated with all lines intact, call button in reach and RN and cousin present    [facility] 6 Click:  [facility] Total Score: 7    Treatment & Education:  Education:  - Pt sat for ~25 mins with CGA <> mod A for static/dynamic sitting balance. Pt performed x 5 reps of AAROM elbow flexion/extension while sitting EOB with x3 second hold in end range of flexion to facilitate improved B UE functional for completion of self-care tasks.   - Pt expressed pain in L heel. L heel noted to have slight skin breakdown. RN notified of development of pressure ulcer.   - Therapist made handmade resistance ball using rolled face cloth and coban for pt to improve  strength. Pt performed gross grasp squeezing using resistance ball for B hand. Pt instructed to perform daily.   - verbal/physical cues provided for most supported hand placement when sitting EOB for improved upright posture.   - Session terminated after midline fell out during therapy session. RN notified immediately.       Assessment:     Lv Crocker is a 42 y.o. male with a medical diagnosis of Acute respiratory failure with hypoxia.  He presents with performance deficits affecting function are weakness, impaired endurance, impaired sensation, impaired self care skills, gait instability, impaired functional mobilty, impaired balance, decreased lower extremity function, decreased upper extremity function, pain, decreased ROM, impaired coordination, impaired skin, impaired cardiopulmonary response to activity.  Pt tolerated session well with increased pain in L toe during sit<> stand transfer. Pt participate in functional B UE strengthening in order to improve (I) with self-care tasks and functional reaching tasks. Pt is highly motivated to participate in therapy sessions and will continue to  benefit from skilled OT     Rehab Prognosis:  Good; patient would benefit from acute skilled OT services to address these deficits and reach maximum level of function.       Plan:     Patient to be seen 5 x/week to address the above listed problems via self-care/home management, therapeutic activities, therapeutic exercises, neuromuscular re-education  · Plan of Care Expires: 05/31/18  · Plan of Care Reviewed with: patient, family    This Plan of care has been discussed with the patient who was involved in its development and understands and is in agreement with the identified goals and treatment plan    GOALS:    Occupational Therapy Goals        Problem: Occupational Therapy Goal    Goal Priority Disciplines Outcome Interventions   Occupational Therapy Goal     OT, PT/OT Ongoing (interventions implemented as appropriate)    Description:  Goals to be met by: 5/8/18    Pt will follow 75% of motor commands with <2 verbal cues for repetition of task to maximize engagement in grooming task.--MET  Pt will complete feeding with mod A.   Pt will complete supine with HOB slightly elevated to seated at EOB with mod A in prep for seated grooming task.  Pt will engage in BUE exercises in orders to increase strength to 3+/5 in BUE's to increase engagement in seated grooming task  Pt will sit at EOB for approx 10 minutes with mod A and unilateral UE support to complete grooming task  Pt will complete sit>stand from EOB with bed in lowest position with mod A in prep for transfer to [facility]                       Time Tracking:     OT Date of Treatment: 05/02/18  OT Start Time: 1400  OT Stop Time: 1441  OT Total Time (min): 41 min    Billable Minutes:Self Care/Home Management 8  Therapeutic Activity 20  Therapeutic Exercise 10    Leonor Wagner OT  5/2/2018

## 2018-05-02 NOTE — PT/OT/SLP PROGRESS
"Speech Language Pathology Treatment    Patient Name:  Lv Crocker   MRN:  [id number]   [id number]/[id number] A    Admitting Diagnosis: Acute respiratory failure with hypoxia    Recommendations:                 General Recommendations:  Dysphagia therapy and Speech/language therapy  Diet recommendations:  Regular, Liquid Diet Level: Thin   Aspiration Precautions:   · CHIN TUCK to chest for all swallows  · Liquids in isolation of food  · 1 bite/sip at a time and slow rate (to allow increased time to complete spontaneous multiple swallows),   · Feed only when awake/alert,   · Frequent oral care,   · HOB to 90 degrees,   · Meds whole buried in puree,   · Monitor for s/s of aspiration,   · Remain upright 30 minutes post meal,   · Small bites/sips and   · Strict aspiration precautions   · Continue to monitor for signs and symptoms of aspiration and discontinue oral feeding should you notice any of the following: watery eyes, reddened facial area, wet vocal quality, increased work of breathing, change in respiratory status, increased congestion, coughing, fever, etc.  General Precautions: Standard, fall  Communication strategies:  go to room if call light pushed    Subjective     "How long will my throat be sore?"     Pain/Comfort:  · Pain Rating 1: 0/10  · Pain Rating Post-Intervention 1: 0/10    Objective:     Has the patient been evaluated by SLP for swallowing?   Yes  Keep patient NPO? No   Current Respiratory Status: trach cuffless, speaking valve      Upon entry, pt awake and alert with PMSV observed in place on trach hub. Size 6 cuffless trach noted. Pt's cousin present at bedside. Clear and dry vocal quality observed. Pt ind'ly recalled the following re: PMSV: removal for sleeping and donning for ALL PO intake. Education provided to clear PMSV with room temp/ luke warm soapy water-no scrubbing-then air dry. Additionally, he ind'ly recalled chin tuck position necessary for ALL bites/ sips, small bites/ sips, and slow " rate of eating/ drinking, as well as ind'ly recalling and demonstrating effortful swallow and supraglottic swallow exercises. Education provided re: supraglottic exercise to breath hold prior to swallow completion. While referencing handout of dysphagia exercises left at bedside during prior SLP session, he ind'ly demonstrated abdelrahman, falsetto /i/, and BOT /k/ and /g/ exercises. Additional education provided re: independent completion of dysphagia exercises 2-3x/ day, 5-10x/ each, consistent implementation of all of the above listed aspiration precautions, and updated SLP POC. Pt and cousin verbalized understanding of all education provided and agreement with SLP POC. White board updated. No further questions.     Assessment:     Lv Crocker is a 42 y.o. male with an SLP diagnosis of mild dysphagia and dysphonia s/p trach.     Goals:    SLP Goals        Problem: SLP Goal    Goal Priority Disciplines Outcome   SLP Goal     SLP Ongoing (interventions implemented as appropriate)   Description:  Updated Speech Language Pathology Goals  Goals expected to be met by 5/4  1. Pt will tolerate a regular diet and thin liquids with no overt s/s of aspiration.   2. Pt will demonstrate/verbalize 3 safe swallowing strategies with min cues.   3. Educate Pt on aspiration precautions and S/S aspiration  4. Pt and family will verbalize/demosntrate understanding of PMSV precautions and safety awareness with no cues.  5. Patient will complete dysphagia therapy exercises x10 each with min cues.     Speech Language Pathology Goals  Goals expected to be met by 4/27/18  1.  Pt will tolerate PMSV for 30 minutes w/ no change in respiratory status and fair voicing produced. -met  2.  Pt will participate in ongoing swallow assessment. -met  3. Pt will name up to 12 items/minute in a concrete category, 90% of attempts, Supervision, to improve cognitive organization. -discontinued  4. Pt will recall 3 related items post 3 minute filled  delay, 90% of attempts, Supervision, to improve STM.-discontinued  5. Pt will answer m/u YNQ with 90% accuracy, Supervision, to improve higher-level comprehension. --discontinued  6. Pt will complete further assessment of reading/writing skills. -discontinued  7. Educate Pt on aspiration precautions and S/S aspiration. -ongoing  8. Educate Pt and family on PMSV precautions and safety awareness. -met; reinforcment to continue  Goals expected to be met by 4/20/18  1.  Pt will tolerate PMSV for 30 minutes w/ no change in respiratory status and fair voicing produced.  2.  Pt will complete further evaluation of cognitive-linguistic communication skills to determine the need for additional goals. Met 4/19  3. Pt will name up to 12 items/minute in a concrete category, 90% of attempts, Supervision, to improve cognitive organization  4. Pt will recall 3 related items post 3 minute filled delay, 90% of attempts, Supervision, to improve STM   5. Pt will answer m/u YNQ with 90% accuracy, Supervision, to improve higher-level comprehension   6. Pt will complete further assessment of reading/writing skills  7. Pending MD clearance, Pt will complete further assessment of swallow fx   8. Educate Pt and family on PMSV precautions and safety awareness    Goals expected to be met by 4/13/18   1.  Pt will tolerate PMSV for 3 min w/ no change in respiratory status and fair voicing produced. Met   2.  Pt will complete further evaluation of cognitive-linguistic communication skills to determine the need for additional goals.                               Plan:     · Patient to be seen:  2 x/week   · Plan of Care expires:  05/05/18  · Plan of Care reviewed with:  patient, family   · SLP Follow-Up:  Yes       Discharge recommendations:  rehabilitation facility     Time Tracking:     SLP Treatment Date:   05/02/18  Speech Start Time:  1200  Speech Stop Time:  1216     Speech Total Time (min):  16 min    Billable Minutes: Treatment Swallowing  Dysfunction 8 and Therapeutic Speech Device 8    YAMILET Apple, CCC-SLP  [phone]  5/2/2018

## 2018-05-02 NOTE — PT/OT/SLP PROGRESS
Physical Therapy Treatment    Patient Name:  Lv Crocker   MRN:  [id number]    Recommendations:     Discharge Recommendations:  rehabilitation facility   Discharge Equipment Recommendations:  (TBD)   Barriers to discharge: Inaccessible home and Decreased caregiver support    Assessment:     Lv Crocker is a 42 y.o. male admitted with a medical diagnosis of Acute respiratory failure with hypoxia.  He presents with the following impairments/functional limitations:  weakness, impaired cardiopulmonary response to activity, impaired endurance, impaired self care skills, impaired functional mobilty, gait instability, impaired balance, decreased lower extremity function, decreased upper extremity function, impaired muscle length, impaired joint extensibility, decreased ROM, pain. Continues to require increased assist to complete functional mobility with assist of 2 needed for all standing tasks. Pt with increased L great toe pain during standing, limiting standing and OOB activity tolerance this date. Pt then with midline IV inadvertently dislodged, terminating session. Pt would continue to benefit from skilled acute PT in order to address current deficits and progress functional mobility.      Rehab Prognosis:  good; patient would benefit from acute skilled PT services to address these deficits and reach maximum level of function.      Recent Surgery: Procedure(s) (LRB):  INSERTION-CATHETER-PERM-A-CATH (Left)  INSERTION-TUBE-GASTROSTOMY (N/A) 28 Days Post-Op    Plan:     During this hospitalization, patient to be seen 5 x/week to address the above listed problems via gait training, therapeutic activities, therapeutic exercises, neuromuscular re-education  · Plan of Care Expires:  05/06/18   Plan of Care Reviewed with: patient, family    Subjective     Communicated with RN prior to session.  Patient found supine upon PT entry to room, agreeable to treatment.      Chief Complaint: L great toe pain with  "standing   Patient comments/goals: "I'm holding my head up. You should be proud."   Pain/Comfort:  · Pain Rating 1:  (did not rate)  · Location - Side 1: Left  · Location - Orientation 1: distal  · Location 1: first toe  · Pain Addressed 1: Reposition, Distraction, Cessation of Activity    Patients cultural, spiritual, Denominational conflicts given the current situation: none noted     Objective:     Patient found with: telemetry, tracheostomy, pressure relief boots (midline)     General Precautions: Standard, fall   Orthopedic Precautions:N/A   Braces: N/A     Functional Mobility:  · Bed Mobility:     · Scooting: maximal assistance of 2 persons for seated scooting along EOB   · Supine to Sit: maximal assistance  · Sit to Supine: total assistance  · Transfers:     · Sit to Stand:  maximal assistance and of 2 persons with no AD x2 reps  · Balance:   · sitting: ranged from CGA-modA    · standing: maxAx2      AM-PAC 6 CLICK MOBILITY  Turning over in bed (including adjusting bedclothes, sheets and blankets)?: 2  Sitting down on and standing up from a chair with arms (e.g., wheelchair, bedside commode, etc.): 2  Moving from lying on back to sitting on the side of the bed?: 2  Moving to and from a bed to a chair (including a wheelchair)?: 2  Need to walk in hospital room?: 1  Climbing 3-5 steps with a railing?: 1  Total Score: 10       Therapeutic Activities and Exercises:   Sitting EOB x~25 minutes with assist ranging from CGA-modA; increased assist needed as pt fatigued; required cues for postural control, balance corrections, and appropriate weight-shifts. Required increased time in static sitting prior to progressing mobility 2* dizzines; cues provided for pursed lip breathing. Performed UE therex with OT while PT provided balance assist and postural cues.   Performed sit<>stand with maxAx2. Pt with increased L great toe pain upon standing, requiring return to sit. Increased time required in sitting to allow pain to " decrease prior to attempting stand again. Unable to off-load great toe due to decreased ankle DF. Performed sit<>stand x2nd trial and static standing x~20 seconds with maxAx2; max cues throughout for upright posture, increased hip ext., and forward gaze with B knees blocked throughout.   Performed scooting along EOB with maxAx2. Pt's midline IV then noted to have come dislodged. RN immediately notified. Pressure applied to site. RN to bedside.   Pressure area noted on L heel. RN notified. Pt educated on importance of wearing pressure relief boots.     Patient left supine with all lines intact, call button in reach, RN notified and pt's cousin present..    GOALS:    Physical Therapy Goals        Problem: Physical Therapy Goal    Goal Priority Disciplines Outcome Goal Variances Interventions   Physical Therapy Goal     PT/OT, PT Ongoing (interventions implemented as appropriate)     Description:  Goals to be met by: 18     Patient will increase functional independence with mobility by performin. Supine to sit with Moderate Assistance.  2. Sit to supine with Moderate Assistance.  3. Rolling to Left and Right with Minimal Assistance.   4. Sit to stand transfer with Moderate Assistance.  5. Bed to chair transfer with Maximum Assistance.  6. Gait  x 5 feet with Minimal Assistance.   7.  Pt to perf and be compliant with LE HEP to improve vascularity and mm strength.                            Time Tracking:     PT Received On: 18  PT Start Time: 1400     PT Stop Time: 1443  PT Total Time (min): 43 min     Billable Minutes: Therapeutic Activity 23 and Neuromuscular Re-education 15   (co-tx with OT)    Treatment Type: Treatment  PT/PTA: PT     PTA Visit Number: 0     Petra Lake PT, DPT   2018  [phone]

## 2018-05-02 NOTE — CONSULTS
Single lumen 18G x10CM midline placed right brachial vein. Max dwell date 5/31/18, Lot#[id number] .  Needle advanced into the vessel under real time ultrasound guidance.  Image recorded and saved.

## 2018-05-02 NOTE — ASSESSMENT & PLAN NOTE
- PT/OT/SLP daily. Recommending rehab.  - Nutrition following.   - Passed MBSS with speech. Started regular diet with thin liquids, but will continue tube feeds nocturnal until eating 75% of meals

## 2018-05-02 NOTE — PROGRESS NOTES
"[facility]  Podiatry  Progress Note    Patient Name: vL Crocker  MRN: [id number]  Admission Date: 3/11/2018  Hospital Length of Stay: 51 days  Attending Physician: Alexandr Hernández MD  Primary Care Provider: Philippe Mohr MD   Interval Hx: Patient Seen in dialysis . Patient complains of pain to toes. Heel protector boots in place.     Scheduled Meds:   sodium chloride 0.9%   Intravenous Once    acetaminophen  650 mg Oral TID    albumin human 25%  25 g Intravenous Once    cetirizine  5 mg Oral Daily    epoetin jose miguel (PROCRIT) injection  4,000 Units Intravenous Every Mon, Wed, Fri    escitalopram oxalate  20 mg Oral Daily    famotidine  20 mg Oral QHS    guaiFENesin  600 mg Oral BID    heparin (porcine)  5,000 Units Subcutaneous Q12H    insulin aspart U-100  3 Units Subcutaneous TIDWM    levothyroxine  137 mcg Oral Before breakfast    midodrine  10 mg Oral TID    polyethylene glycol  17 g Oral BID    predniSONE  5 mg Oral Daily    QUEtiapine  50 mg Oral QHS    tacrolimus  4 mg Oral BID     Continuous Infusions:   insulin (HUMAN R) infusion (adults) 0.8 Units/hr (05/01/18 0918)     PRN Meds:sodium chloride 0.9%, sodium chloride 0.9%, albuterol-ipratropium 2.5mg-0.5mg/3mL, ALPRAZolam, benzonatate, dextrose 50%, dextrose 50%, glucagon (human recombinant), glucose, glucose, heparin (porcine), insulin aspart U-100, midodrine, ondansetron, oxyCODONE    Review of patient's allergies indicates:   Allergen Reactions    No known drug allergies         Past Medical History:   Diagnosis Date    Anxiety     Arthritis     Ascites     Thought to be 2/2 heart tpx; liver bx 3/31/17 w/o significant fibrosis    Cardiomyopathy     Depression     Controlled "for the most part" on Lexipro    Encounter for blood transfusion     during transplant    GERD (gastroesophageal reflux disease)     Hashimoto's disease     History of CHF (congestive heart failure)     Previously EF 20% " (prior to heart transplant); last EF 60%, PAP 41 as of 12/12/17; throught to be 2/2 genetics & DM1    History of coronary artery disease     H/o Coronary artery disease; resolved since heart transplant 2014    History of myocardial infarction     h/o MI x3 prior to heart transplant    HLD (hyperlipidemia) 6/11/2015    Hx of psychiatric care     Hypoglycemia unawareness in type 1 diabetes mellitus     Hypothyroid     Initially hyperthyroid 2/2 Hoshimoto's thyroiditis; now on levothyroxine 150 mcg qd    Pleural effusion due to another disorder     Thought to be 2/2 heart tpx; s/p PleuRx catheter placement and removal after 1-2 months    Psychiatric problem     Pulmonary hypertension assoc with unclear multi-factorial mechanisms 12/12/2017    PAP 41 (EF 60%) on ECHO 12/12/17    Syncope 6/30/2015    Type I diabetes mellitus, well controlled     Well controlled; Dx'd @5y/o; on N insulin 20U BID & Humalog 10U w/meals +SSI; Glu 89 11/9/17; A1c 7.2% 4/5/17    Unspecified essential hypertension 05/29/2014    Well controlled; Last /57 on 11/9/17     Past Surgical History:   Procedure Laterality Date    CARDIAC CATHETERIZATION      CARDIAC SURGERY      CHOLECYSTECTOMY      COLONOSCOPY N/A 3/13/2018    Procedure: COLONOSCOPY;  Surgeon: Tim Spencer MD;  Location: [facility] ([address] [place] [address]);  Service: Endoscopy;  Laterality: N/A;    CORONARY ANGIOPLASTY      FOOT SPLIT TRANSFER OF THE POSTERIOR TIBIALIS TENDON PROCEDURE      HEART TRANSPLANT  2014    KNEE SURGERY      PleuRx catheter placement  01/11/2018    Plan for removal after 1-2 months by Dr. James in cardiothoracic surgery    s/p oht  November 2014    stent placemnet         Family History     Problem Relation (Age of Onset)    Cancer Brother (50)    Diabetes Mother, Father, Brother, Son, Sister, Maternal Uncle, Paternal Aunt, Maternal Grandmother, Maternal Grandfather    Heart disease Mother, Brother    Hypertension Mother, Father, Brother     Kidney disease Mother, Father    Stroke Father, Brother    Vision loss Brother        Social History Main Topics    Smoking status: Never Smoker    Smokeless tobacco: Never Used    Alcohol use No    Drug use: No    Sexual activity: Yes     Partners: Female     Review of Systems   Unable to perform ROS: Mental status change     Objective:     Vital Signs (Most Recent):  Temp: 98 °F (36.7 °C) (05/02/18 0655)  Pulse: (!) 119 (05/02/18 0822)  Resp: 18 (05/02/18 0822)  BP: (!) 111/58 (05/02/18 0655)  SpO2: 95 % (05/02/18 0822) Vital Signs (24h Range):  Temp:  [97.9 °F (36.6 °C)-98.9 °F (37.2 °C)] 98 °F (36.7 °C)  Pulse:  [112-120] 119  Resp:  [16-20] 18  SpO2:  [93 %-98 %] 95 %  BP: (100-117)/(57-70) 111/58     Weight: 74.6 kg (164 lb 7.4 oz)  Body mass index is 25.76 kg/m².    Foot Exam    General  Orientation: disoriented       Right Foot/Ankle     Inspection and Palpation  Tenderness: great toe metatarsophalangeal joint   Skin Exam: skin changes and abnormal color;     Neurovascular  Dorsalis pedis: 1+  Posterior tibial: 1+  Saphenous nerve sensation: diminished  Tibial nerve sensation: diminished  Superficial peroneal nerve sensation: diminished  Deep peroneal nerve sensation: diminished  Sural nerve sensation: diminished      Left Foot/Ankle      Inspection and Palpation  Tenderness: great toe interphalangeal joint and great toe metatarsophalangeal joint   Skin Exam: skin changes;     Neurovascular  Dorsalis pedis: 1+  Posterior tibial: 1+  Saphenous nerve sensation: diminished  Tibial nerve sensation: diminished  Superficial peroneal nerve sensation: diminished  Deep peroneal nerve sensation: diminished  Sural nerve sensation: diminished          Gangrene noted to hallux and 3rd toe. Stable no purulent drainage noted. Ischemic changes noted to right second toe. Pain upon palpation to right hallux and 3rd toe.                           Mild ischemic changes noted to left hallux. CFT <3s. No purulent drainage  noted.     Left foot.                           Laboratory:  CBC:     Recent Labs  Lab 05/02/18  0447   WBC 5.82   RBC 2.25*   HGB 7.1*   HCT 22.7*      *   MCH 31.6*   MCHC 31.3*     CMP:     Recent Labs  Lab 05/02/18  0446   *   CALCIUM 9.7   ALBUMIN 2.1*   PROT 6.3   *   K 4.5   CO2 24   CL 94*   BUN 40*   CREATININE 3.5*   ALKPHOS 200*   ALT 13   AST 16   BILITOT 0.5       Diagnostic Results:  Xray: Left: No fracture dislocation bone destruction seen.  There is mild DJD.  There are vascular changes suggesting diabetes.  There are spurs on the calcaneus.    Right: There is hardware consistent with talocalcaneal arthrodesis.  There are vascular calcification suggesting diabetes.  No fracture dislocation bone destruction seen.    Clinical Findings:  Dry stable gangrene noted to right hallux and 3rd toe. Ischemic changes noted to right 2nd and left hallux.     Assessment/Plan:     Gangrene    Lv Crocker is a 42 y.o. 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 Ischemic changes to right second toe and left hallux.   -SULMA's within normal limits  -x-ray not concerning for infection  -Painted with betadine, nursing to paint toes with betadine daily  -Per transplant service, recommend against surgical intervention due to inmunosupressed state.  Agree with transplant service.  Toes stable, no surgical intervention indicated at this time.     DC instructions: patient to follow up with podiatry within one week of discharge.  Patient to have toes painted with betadine daily.    Weightbearing Status: WBAT B/L  Offloading Device: DARCO shoe.             Type 1 diabetes mellitus with stage 3 chronic kidney disease    Per primary         Heart transplanted    Per primary            Aston Head MD  Podiatry  [facility]

## 2018-05-02 NOTE — PROGRESS NOTES
[facility]  Physical Medicine & Rehab  Progress Note    Patient Name: Lv Crocker  MRN: [id number]  Admission Date: 3/11/2018  Length of Stay: 51 days  Attending Physician: Alexandr Hernández MD    Subjective:     Principal Problem:Acute respiratory failure with hypoxia    Hospital Course:   3/24/18:  Evaluated by PT and OT.  Bed mobility and transfers deferred 2/2 intubation and CRRT.   3/26/18:  Participated with PT.  Bed mobility totalA-dependent.  EOB totalA.  3/28/18:  Participated with PT and OT.  Bed mobility totalA-dependent.  ADLs totalA.   4/16/18:  Participated with PT and OT.  Bed mobility max-totalA/dependent.  Sit to stand totalA and transfers totalA.  EOB mod-totalA.  UBD totalA and LBD totalA.  4/17/18:  Participated with PT and SLP.  Bed mobility total-dependent.  EOB x 15 minutes mod-maxA.   4/18/18:  Participated with OT.  Bed mobility totalA.  EOB x 15 minutes totalA.    4/19/18:  Participated with PT, OT, and SLP.  Found to have cognitive-linguistic impairment and dysphonia.  Remains NPO.  Bed mobility total/dependent.  EOB x 24 minutes (static sit x ~10 seconds + ~5 seconds SV) mod-maxA.  No sit to stand, transfers, or gait.  ADLs totalA.   4/22/18:  Participated with PT and OT.  Bed mobility totalA.  EOB maxA.  Sit to stand max-totalA and transfers max-totalA.  LBD maxA.  4/23/18:  No PT or OT 2/2 HD.   4/24/18:  Participated with PT.  Bed mobility maxA.  Sit to stand and transfers maxA.  EOB maxA.   4/25/18:  Participated with OT.  Bed mobility totalA.  Bed to medi chair transfer dependent (totalA x 2).  EOB x 20 minutes maxA.  ADLs totalA.    4/26/18:  Passed MBSS.  SLP recommendation: regular diet and thin liquids (with a chin tuck in isolation of food).  Participated with PT and OT.  Bed mobility totalA.  Declined OOB activty with therapy 2/2 waiting on family to eat and wanted to be in bed for meal.  Medi chair in afternoon.  4/27/18:  Participated with PT, OT,  and SLP.  Bed mobility totalA-dependent.  Sit to stand totalA-dependent.  EOB SBA-maxA.  Standing balance totalA.   4/28/18:  Participated with PT.  Bed mobility totalA-dependent.    4/30/18:  Participated with PT and OT.  Bed mobility totalA (maxA x 2).  Declined EOB 2/2 pain and fatigue from HD.  UBD totalA and LBD totalA.    5/1/18:  Participated with PT, OT, and SLP.  Bed mobility max-totalA.  EOB SV-Carmina.  Sit to stand totalA (maxA x 2) and transfers dependent (totalA x 2).  UBD totalA.    Interval History 5/2/2018:  Patient is seen for follow-up rehab evaluation and recommendations: No acute events over night.  HD this morning.  Participating with therapy.  Barriers for discharge/rehab admission: HD chair, insurance approval     HPI, Past Medical, Family, and Social History remains the same as documented in the initial encounter.    Scheduled Medications:    sodium chloride 0.9%   Intravenous Once    acetaminophen  650 mg Oral TID    albumin human 25%  25 g Intravenous Once    cetirizine  5 mg Oral Daily    [START ON 5/4/2018] epoetin jose miguel (PROCRIT) injection  100 Units/kg (Dosing Weight) Intravenous Every Mon, Wed, Fri    escitalopram oxalate  20 mg Oral Daily    famotidine  20 mg Oral QHS    guaiFENesin  600 mg Oral BID    heparin (porcine)  5,000 Units Subcutaneous Q12H    insulin aspart U-100  3 Units Subcutaneous TIDWM    levothyroxine  137 mcg Oral Before breakfast    midodrine  10 mg Oral TID    mycophenolate  250 mg Oral BID    polyethylene glycol  17 g Oral BID    predniSONE  5 mg Oral Daily    QUEtiapine  50 mg Oral QHS    tacrolimus  1 mg Oral Once    tacrolimus  5 mg Oral BID     PRN Medications: sodium chloride 0.9%, sodium chloride 0.9%, albuterol-ipratropium 2.5mg-0.5mg/3mL, ALPRAZolam, benzonatate, dextrose 50%, dextrose 50%, glucagon (human recombinant), glucose, glucose, heparin (porcine), insulin aspart U-100, midodrine, ondansetron, oxyCODONE    Review of Systems    Constitutional: Positive for activity change. Negative for chills, fatigue and fever.   HENT: Negative for drooling, hearing loss, trouble swallowing and voice change.    Eyes: Negative for pain and visual disturbance.   Respiratory: Negative for cough and wheezing.         + trach   Cardiovascular: Negative for chest pain and palpitations.   Gastrointestinal: Negative for abdominal pain, nausea and vomiting.        + PEG, positive acid reflux.    Genitourinary: Negative for difficulty urinating and flank pain.   Musculoskeletal: Positive for arthralgias, gait problem and myalgias. Negative for back pain and neck pain.   Skin: Positive for color change and wound. Negative for rash.   Allergic/Immunologic: Positive for immunocompromised state.   Neurological: Positive for weakness. Negative for dizziness, numbness and headaches.   Psychiatric/Behavioral: Positive for sleep disturbance (improving). Negative for agitation and hallucinations. The patient is not nervous/anxious.      Objective:     Vital Signs (Most Recent):  Temp: 98.7 °F (37.1 °C) (05/02/18 1113)  Pulse: (!) 120 (05/02/18 1113)  Resp: 16 (05/02/18 1113)  BP: (!) 104/49 (05/02/18 1113)  SpO2: (!) 92 % (05/02/18 1113)    Vital Signs (24h Range):  Temp:  [97.9 °F (36.6 °C)-98.9 °F (37.2 °C)] 98.7 °F (37.1 °C)  Pulse:  [112-120] 120  Resp:  [16-20] 16  SpO2:  [92 %-98 %] 92 %  BP: ()/(46-87) 104/49     Physical Exam   Constitutional: He is oriented to person, place, and time. He appears well-developed. No distress.   HENT:   Head: Normocephalic and atraumatic.   Right Ear: External ear normal.   Left Ear: External ear normal.   Nose: Nose normal.   Eyes: Right eye exhibits no discharge. Left eye exhibits no discharge. No scleral icterus.   Neck: Neck supple.   Trach intact.    Cardiovascular: Normal rate, regular rhythm and intact distal pulses.    Pulmonary/Chest: Effort normal. No respiratory distress. He has no wheezes.   Abdominal: Soft. He  "exhibits no distension. There is no tenderness.   PEG intact, incision LETICIA, CDI   Musculoskeletal: He exhibits no edema.        Right shoulder: He exhibits decreased range of motion and tenderness.   R foot: R great toe and R 3rd toe with necrotic tissue  L foot: small amount of necrotic tissue to L great toe  Overall deconditioning   Neurological: He is alert and oriented to person, place, and time.   -  Mental Status:  AAOx3.  Follows commands.  Answers correct age and .  Recent and remote memory intact.  -  Speech and language:  no aphasia or dysarthria.    -  Motor:  RUE: 3-/5, 3/5 .  LUE: 2+/5, 3/5 .  RLE: Proximal 2/5, distal 3+/5, DF 4-/5, PF 4-/5.  LLE: Proximal 2+/5, distal 4-/5, DF 3-/5, PF 4-/5.  -  Sensory:  Intact to light touch and pin prick.   Skin: Skin is warm and dry. No rash noted.   Psychiatric: His behavior is normal. Thought content normal. His affect is blunt.   Vitals reviewed.    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 Ischemic changes to right second toe and left hallux  -  Wound care following  -  Podiatry following         Severe malnutrition    -  S/p PEG placement on 18  -  On continuous TF        Type 1 diabetes mellitus with stage 3 chronic kidney disease    -  Endocrine following  -  On insulin gtt        Acute renal failure with acute tubular necrosis superimposed on stage 3 chronic kidney disease    -  Nephrology following "anuric CACHORRO; likely ischemic ATN 2/2 prolonged pre-renal state (diarrhea) in setting of prograf renal vasoconstriction; cannot r/o septic ATN or Prograf toxicity"  -  On HD  -  S/p perm-a-cath placement on " 4/4/18        Heart transplanted    -  S/p OHTX 11/18/2014 with early post-op course complicated by hemorrhagic tamponade s/p  washout, delayed closure, pericardial window with subsequent a-fib with RVR and CACHORRO and late course complicated by ABMR (in 4/2015 s/p rituxan, PLEX, and IVIG), C.diff/CMV reactivation, CKD, and restrictive cardiomyopathy with subsequent chronic/recurrent pleural effusions and ascites previously requiring monthly paracentesis and thoracentesis until he underwent VATS with pleurX catheter placement on 1/11/2018 with removal on 2/7/2018        Debility    -  2/2 prolonged and acute hospital course  -  (I) ADLs and mobility prior to admission, limited by fatigue/endurance  Recommendations  -  Encourage mobility, OOB in chair, and early ambulation as appropriate  -  PT/OT evaluate and treat  -  Pain management  -  Monitor for and prevent skin breakdown and pressure ulcers  · Early mobility, repositioning/weight shifting every 20-30 minutes when sitting, turn patient every 2 hours, proper mattress/overlay and chair cushioning, pressure relief/heel protector boots  -  DVT prophylaxis:  MARK, SCD        Anxiety    -  Anxiety and sleeping difficulty   -  Psych consulted--> increased Lexapro to 20 mg, started Seroquel--> stopped xanax and nortriptyline 2/2 hallucinations        Patient approved for [facility].  Insurance and HD chair pending admission.     Pinky Garcia, RENETTA  Department of Physical Medicine & Rehab   [facility]

## 2018-05-02 NOTE — PLAN OF CARE
Patient was off the unit in dialysis.    Reviewed insulin regimen and CBG.     Increase transition gtt to 1.0 units/hr  Continue prandial novolog 3 units  LDSS  TERI,HS,0200     Will continue to follow along, please call with any questions.

## 2018-05-02 NOTE — PROGRESS NOTES
This RN called in by physical/occupational therapists due to midline out of patient's arm. Pressure dressing applied, Insulin gtt paused. Midline team consulted STAT for new access. Midline team at bedside now placing new line. WCTM.

## 2018-05-02 NOTE — PLAN OF CARE
Problem: Occupational Therapy Goal  Goal: Occupational Therapy Goal  Goals to be met by: 5/8/18    Pt will follow 75% of motor commands with <2 verbal cues for repetition of task to maximize engagement in grooming task.--MET  Pt will complete feeding with mod A.   Pt will complete supine with HOB slightly elevated to seated at EOB with mod A in prep for seated grooming task.  Pt will engage in BUE exercises in orders to increase strength to 3+/5 in BUE's to increase engagement in seated grooming task  Pt will sit at EOB for approx 10 minutes with mod A and unilateral UE support to complete grooming task  Pt will complete sit>stand from EOB with bed in lowest position with mod A in prep for transfer to [facility]      Outcome: Ongoing (interventions implemented as appropriate)  Pt progressing well towards all goals.   Continue POC     Leonor Wagner, OTR/L  [phone]  5/2/2018

## 2018-05-02 NOTE — SUBJECTIVE & OBJECTIVE
"Interval Hx: Patient Seen in dialysis . Patient complains of pain to toes. Heel protector boots in place.     Scheduled Meds:   sodium chloride 0.9%   Intravenous Once    acetaminophen  650 mg Oral TID    albumin human 25%  25 g Intravenous Once    cetirizine  5 mg Oral Daily    epoetin jose miguel (PROCRIT) injection  4,000 Units Intravenous Every Mon, Wed, Fri    escitalopram oxalate  20 mg Oral Daily    famotidine  20 mg Oral QHS    guaiFENesin  600 mg Oral BID    heparin (porcine)  5,000 Units Subcutaneous Q12H    insulin aspart U-100  3 Units Subcutaneous TIDWM    levothyroxine  137 mcg Oral Before breakfast    midodrine  10 mg Oral TID    polyethylene glycol  17 g Oral BID    predniSONE  5 mg Oral Daily    QUEtiapine  50 mg Oral QHS    tacrolimus  4 mg Oral BID     Continuous Infusions:   insulin (HUMAN R) infusion (adults) 0.8 Units/hr (05/01/18 0918)     PRN Meds:sodium chloride 0.9%, sodium chloride 0.9%, albuterol-ipratropium 2.5mg-0.5mg/3mL, ALPRAZolam, benzonatate, dextrose 50%, dextrose 50%, glucagon (human recombinant), glucose, glucose, heparin (porcine), insulin aspart U-100, midodrine, ondansetron, oxyCODONE    Review of patient's allergies indicates:   Allergen Reactions    No known drug allergies         Past Medical History:   Diagnosis Date    Anxiety     Arthritis     Ascites     Thought to be 2/2 heart tpx; liver bx 3/31/17 w/o significant fibrosis    Cardiomyopathy     Depression     Controlled "for the most part" on Lexipro    Encounter for blood transfusion     during transplant    GERD (gastroesophageal reflux disease)     Hashimoto's disease     History of CHF (congestive heart failure)     Previously EF 20% (prior to heart transplant); last EF 60%, PAP 41 as of 12/12/17; throught to be 2/2 genetics & DM1    History of coronary artery disease     H/o Coronary artery disease; resolved since heart transplant 2014    History of myocardial infarction     h/o MI x3 prior " to heart transplant    HLD (hyperlipidemia) 6/11/2015    Hx of psychiatric care     Hypoglycemia unawareness in type 1 diabetes mellitus     Hypothyroid     Initially hyperthyroid 2/2 Hoshimoto's thyroiditis; now on levothyroxine 150 mcg qd    Pleural effusion due to another disorder     Thought to be 2/2 heart tpx; s/p PleuRx catheter placement and removal after 1-2 months    Psychiatric problem     Pulmonary hypertension assoc with unclear multi-factorial mechanisms 12/12/2017    PAP 41 (EF 60%) on ECHO 12/12/17    Syncope 6/30/2015    Type I diabetes mellitus, well controlled     Well controlled; Dx'd @7y/o; on N insulin 20U BID & Humalog 10U w/meals +SSI; Glu 89 11/9/17; A1c 7.2% 4/5/17    Unspecified essential hypertension 05/29/2014    Well controlled; Last /57 on 11/9/17     Past Surgical History:   Procedure Laterality Date    CARDIAC CATHETERIZATION      CARDIAC SURGERY      CHOLECYSTECTOMY      COLONOSCOPY N/A 3/13/2018    Procedure: COLONOSCOPY;  Surgeon: Tim Spencer MD;  Location: [facility] ([address] [place] [address]);  Service: Endoscopy;  Laterality: N/A;    CORONARY ANGIOPLASTY      FOOT SPLIT TRANSFER OF THE POSTERIOR TIBIALIS TENDON PROCEDURE      HEART TRANSPLANT  2014    KNEE SURGERY      PleuRx catheter placement  01/11/2018    Plan for removal after 1-2 months by Dr. James in cardiothoracic surgery    s/p oht  November 2014    stent placemnet         Family History     Problem Relation (Age of Onset)    Cancer Brother (50)    Diabetes Mother, Father, Brother, Son, Sister, Maternal Uncle, Paternal Aunt, Maternal Grandmother, Maternal Grandfather    Heart disease Mother, Brother    Hypertension Mother, Father, Brother    Kidney disease Mother, Father    Stroke Father, Brother    Vision loss Brother        Social History Main Topics    Smoking status: Never Smoker    Smokeless tobacco: Never Used    Alcohol use No    Drug use: No    Sexual activity: Yes     Partners: Female      Review of Systems   Unable to perform ROS: Mental status change     Objective:     Vital Signs (Most Recent):  Temp: 98 °F (36.7 °C) (05/02/18 0655)  Pulse: (!) 119 (05/02/18 0822)  Resp: 18 (05/02/18 0822)  BP: (!) 111/58 (05/02/18 0655)  SpO2: 95 % (05/02/18 0822) Vital Signs (24h Range):  Temp:  [97.9 °F (36.6 °C)-98.9 °F (37.2 °C)] 98 °F (36.7 °C)  Pulse:  [112-120] 119  Resp:  [16-20] 18  SpO2:  [93 %-98 %] 95 %  BP: (100-117)/(57-70) 111/58     Weight: 74.6 kg (164 lb 7.4 oz)  Body mass index is 25.76 kg/m².    Foot Exam    General  Orientation: disoriented       Right Foot/Ankle     Inspection and Palpation  Tenderness: great toe metatarsophalangeal joint   Skin Exam: skin changes and abnormal color;     Neurovascular  Dorsalis pedis: 1+  Posterior tibial: 1+  Saphenous nerve sensation: diminished  Tibial nerve sensation: diminished  Superficial peroneal nerve sensation: diminished  Deep peroneal nerve sensation: diminished  Sural nerve sensation: diminished      Left Foot/Ankle      Inspection and Palpation  Tenderness: great toe interphalangeal joint and great toe metatarsophalangeal joint   Skin Exam: skin changes;     Neurovascular  Dorsalis pedis: 1+  Posterior tibial: 1+  Saphenous nerve sensation: diminished  Tibial nerve sensation: diminished  Superficial peroneal nerve sensation: diminished  Deep peroneal nerve sensation: diminished  Sural nerve sensation: diminished          Gangrene noted to hallux and 3rd toe. Stable no purulent drainage noted. Ischemic changes noted to right second toe. Pain upon palpation to right hallux and 3rd toe.                           Mild ischemic changes noted to left hallux. CFT <3s. No purulent drainage noted.     Left foot.                           Laboratory:  CBC:     Recent Labs  Lab 05/02/18  0447   WBC 5.82   RBC 2.25*   HGB 7.1*   HCT 22.7*      *   MCH 31.6*   MCHC 31.3*     CMP:     Recent Labs  Lab 05/02/18  0446   *   CALCIUM 9.7    ALBUMIN 2.1*   PROT 6.3   *   K 4.5   CO2 24   CL 94*   BUN 40*   CREATININE 3.5*   ALKPHOS 200*   ALT 13   AST 16   BILITOT 0.5       Diagnostic Results:  Xray: Left: No fracture dislocation bone destruction seen.  There is mild DJD.  There are vascular changes suggesting diabetes.  There are spurs on the calcaneus.    Right: There is hardware consistent with talocalcaneal arthrodesis.  There are vascular calcification suggesting diabetes.  No fracture dislocation bone destruction seen.    Clinical Findings:  Dry stable gangrene noted to right hallux and 3rd toe. Ischemic changes noted to right 2nd and left hallux.

## 2018-05-02 NOTE — PLAN OF CARE
Problem: Patient Care Overview  Goal: Plan of Care Review  Outcome: Ongoing (interventions implemented as appropriate)  Pt has call bell in reach, non slip socks on, and bedrails up x2. Pt working with PT/OT during day. Pt encouraged to wash hands. Pt has HD MWF. Pt on insulin gtt with ac/hs and 2 am. Pt has labs in am.

## 2018-05-02 NOTE — PLAN OF CARE
Problem: SLP Goal  Goal: SLP Goal  Updated Speech Language Pathology Goals  Goals expected to be met by 5/4  1. Pt will tolerate a regular diet and thin liquids with no overt s/s of aspiration.   2. Pt will demonstrate/verbalize 3 safe swallowing strategies with min cues.   3. Educate Pt on aspiration precautions and S/S aspiration  4. Pt and family will verbalize/demosntrate understanding of PMSV precautions and safety awareness with no cues.  5. Patient will complete dysphagia therapy exercises x10 each with min cues.     Speech Language Pathology Goals  Goals expected to be met by 4/27/18  1.  Pt will tolerate PMSV for 30 minutes w/ no change in respiratory status and fair voicing produced. -met  2.  Pt will participate in ongoing swallow assessment. -met  3. Pt will name up to 12 items/minute in a concrete category, 90% of attempts, Supervision, to improve cognitive organization. -discontinued  4. Pt will recall 3 related items post 3 minute filled delay, 90% of attempts, Supervision, to improve STM.-discontinued  5. Pt will answer m/u YNQ with 90% accuracy, Supervision, to improve higher-level comprehension. --discontinued  6. Pt will complete further assessment of reading/writing skills. -discontinued  7. Educate Pt on aspiration precautions and S/S aspiration. -ongoing  8. Educate Pt and family on PMSV precautions and safety awareness. -met; reinforcment to continue  Goals expected to be met by 4/20/18  1.  Pt will tolerate PMSV for 30 minutes w/ no change in respiratory status and fair voicing produced.  2.  Pt will complete further evaluation of cognitive-linguistic communication skills to determine the need for additional goals. Met 4/19  3. Pt will name up to 12 items/minute in a concrete category, 90% of attempts, Supervision, to improve cognitive organization  4. Pt will recall 3 related items post 3 minute filled delay, 90% of attempts, Supervision, to improve STM   5. Pt will answer m/u YNQ with 90%  accuracy, Supervision, to improve higher-level comprehension   6. Pt will complete further assessment of reading/writing skills  7. Pending MD clearance, Pt will complete further assessment of swallow fx   8. Educate Pt and family on PMSV precautions and safety awareness    Goals expected to be met by 4/13/18   1.  Pt will tolerate PMSV for 3 min w/ no change in respiratory status and fair voicing produced. Met   2.  Pt will complete further evaluation of cognitive-linguistic communication skills to determine the need for additional goals.              Outcome: Ongoing (interventions implemented as appropriate)  With chin tuck position for swallow of all PO intake, recommend ongoing regular consistency diet and thin liquids. PMSV MUST be worn for ALL PO intake. SLP tx freq dec'd to 2x/ week 2/2 good progress within SLP sessions.     YAMILET Apple, CCC-SLP  [phone]  5/2/2018

## 2018-05-02 NOTE — SUBJECTIVE & OBJECTIVE
Interval History: patient changed to nocturnal tube feeds as he is eating 25% of meals    Continuous Infusions:   insulin (HUMAN R) infusion (adults) 0.8 Units/hr (05/02/18 1218)     Scheduled Meds:   sodium chloride 0.9%   Intravenous Once    acetaminophen  650 mg Oral TID    albumin human 25%  25 g Intravenous Once    cetirizine  5 mg Oral Daily    [START ON 5/4/2018] epoetin jose miguel (PROCRIT) injection  100 Units/kg (Dosing Weight) Intravenous Every Mon, Wed, Fri    escitalopram oxalate  20 mg Oral Daily    famotidine  20 mg Oral QHS    guaiFENesin  600 mg Oral BID    heparin (porcine)  5,000 Units Subcutaneous Q12H    insulin aspart U-100  3 Units Subcutaneous TIDWM    levothyroxine  137 mcg Oral Before breakfast    midodrine  10 mg Oral TID    mycophenolate  250 mg Oral BID    polyethylene glycol  17 g Oral BID    predniSONE  5 mg Oral Daily    QUEtiapine  50 mg Oral QHS    tacrolimus  5 mg Oral BID     PRN Meds:sodium chloride 0.9%, sodium chloride 0.9%, albuterol-ipratropium 2.5mg-0.5mg/3mL, ALPRAZolam, benzonatate, dextrose 50%, dextrose 50%, glucagon (human recombinant), glucose, glucose, heparin (porcine), insulin aspart U-100, midodrine, ondansetron, oxyCODONE    Review of patient's allergies indicates:   Allergen Reactions    No known drug allergies      Objective:     Vital Signs (Most Recent):  Temp: 98.7 °F (37.1 °C) (05/02/18 1113)  Pulse: (!) 119 (05/02/18 1128)  Resp: 16 (05/02/18 1113)  BP: (!) 104/49 (05/02/18 1113)  SpO2: (!) 92 % (05/02/18 1113) Vital Signs (24h Range):  Temp:  [98 °F (36.7 °C)-98.9 °F (37.2 °C)] 98.7 °F (37.1 °C)  Pulse:  [112-120] 119  Resp:  [16-20] 16  SpO2:  [92 %-98 %] 92 %  BP: ()/(46-87) 104/49     Patient Vitals for the past 72 hrs (Last 3 readings):   Weight   05/02/18 0600 74.6 kg (164 lb 7.4 oz)     Body mass index is 25.76 kg/m².      Intake/Output Summary (Last 24 hours) at 05/02/18 1432  Last data filed at 05/02/18 1300   Gross per 24 hour    Intake          1129.56 ml   Output               80 ml   Net          1049.56 ml       Hemodynamic Parameters:           Physical Exam   Constitutional: He is oriented to person, place, and time. He appears well-developed. No distress.   HENT:   Head: Normocephalic and atraumatic.   Right Ear: External ear normal.   Left Ear: External ear normal.   Nose: Nose normal.   Eyes: Right eye exhibits no discharge. Left eye exhibits no discharge. No scleral icterus.   Neck: Neck supple.   Trach intact.    Cardiovascular: Normal rate, regular rhythm and intact distal pulses.    Pulmonary/Chest: Effort normal. No respiratory distress. He has no wheezes.   Abdominal: Soft. He exhibits no distension. There is no tenderness.   PEG intact, incision LETICIA, CDI   Musculoskeletal: He exhibits no edema.        Right shoulder: He exhibits decreased range of motion and tenderness.   R foot: R great toe and R 3rd toe with necrotic tissue  L foot: small amount of necrotic tissue to L great toe  Overall deconditioning   Neurological: He is alert and oriented to person, place, and time.   -  Mental Status:  AAOx3.  Follows commands.  Answers correct age and .  Recent and remote memory intact.  -  Speech and language:  no aphasia or dysarthria.    -  Motor:  RUE: 3-/5, 3/5 .  LUE: 2+/5, 3/5 .  RLE: Proximal 2/5, distal 3+/5, DF 4-/5, PF 4-/5.  LLE: Proximal 2+/5, distal 4-/5, DF 3-/5, PF 4-/5.  -  Sensory:  Intact to light touch and pin prick.   Skin: Skin is warm and dry. No rash noted.   Psychiatric: His behavior is normal. Thought content normal. His affect is blunt.   Vitals reviewed.      Significant Labs:  CBC:    Recent Labs  Lab 18  0615 18  0425 18  0447   WBC 4.74 5.18 5.82   RBC 2.41* 2.34* 2.25*   HGB 7.5* 7.3* 7.1*   HCT 24.2* 23.7* 22.7*    305 315   * 101* 101*   MCH 31.1* 31.2* 31.6*   MCHC 31.0* 30.8* 31.3*     BNP:  No results for input(s): BNP in the last 168 hours.    Invalid  input(s): MICHAEL  CMP:    Recent Labs  Lab 04/30/18  0615 05/01/18  0426 05/02/18  0446    305* 220*   CALCIUM 10.3 9.0 9.7   ALBUMIN 2.3* 2.1* 2.1*   PROT 6.5 6.0 6.3   * 134* 132*   K 5.4* 4.1 4.5   CO2 20* 24 24   CL 95 96 94*   BUN 57* 26* 40*   CREATININE 4.1* 2.6* 3.5*   ALKPHOS 215* 210* 200*   ALT 14 15 13   AST 18 20 16   BILITOT 0.3 0.5 0.5      Coagulation:   No results for input(s): PT, INR, APTT in the last 168 hours.  LDH:  No results for input(s): LDH in the last 72 hours.  Microbiology:  Microbiology Results (last 7 days)     Procedure Component Value Units Date/Time    Fungus culture [[id number]] Collected:  03/28/18 1607    Order Status:  Completed Specimen:  Body Fluid from Lung, RLL Updated:  05/01/18 1347     Fungus (Mycology) Culture No fungus isolated after 4 weeks    Culture, Respiratory with Gram Stain [[id number]] Collected:  04/26/18 1329    Order Status:  Completed Specimen:  Respiratory from Tracheal Aspirate Updated:  04/30/18 1253     Respiratory Culture Normal respiratory hank     Gram Stain (Respiratory) <10 epithelial cells per low power field.     Gram Stain (Respiratory) No WBC's     Gram Stain (Respiratory) Rare Gram negative rods    Respiratory Viral Panel by PCR [facility]; Nasal Swab [[id number]] Collected:  04/26/18 1123    Order Status:  Completed Specimen:  Respiratory Updated:  04/27/18 1110     Respiratory Virus Panel, source Nasal Swab     RVP - Adenovirus Not Detected     Comment: Detects Serotypes B and E. Detection of Serotype C may   be limited. If Adenovirus infection is suspected and a   Not Detected result is returned the sample should be   re-tested for Adenovirus using an independent method  (e.g. [employer] Adenovirus Quantitative Real-Time  PCR test.          Enterovirus Not Detected     Comment: Cross-reactivity has been observed between certain Rhinovirus  strains and the Enterovirus assay.          Human Bocavirus Not Detected     Human  Coronavirus Not Detected     Comment: The Human Coronavirus assay detects Human coronavirus types  229E, OC43,NL63 and HKU1.          RVP - Human Metapneumovirus (hMPV) Not Detected     RVP - Influenza A Not Detected     Influenza A - A2D4-35 Not Detected     RVP - Influenza B Not Detected     Parainfluenza Not Detected     Respiratory Syncytial VirusVirus (RSV) A Not Detected     Comment: The Respiratory Syncytial Viral assay detects types A and B,  however it does not distinguish between the two.          RVP - Rhinovirus Not Detected     Comment: Cross-Reactivity has been observed between certain   Rhinovirus strains and the Enterovirus assay.  Target Enriched Mulitplex Polymerase Chain Reaction (TEM-PCR)  allows for the detection of multiple pathogens out of a single  reaction.  This test was developed and its performance   characteristics determined by [employer].  It has not   been cleared or approved by the U.[place].Food and Drug Administration.  Results should be used in conjunction with clinical findings,   and should not form the sole basis for a diagnosis or treatment  decision.  TEM-PCR is a licensed technology of [employer].         Narrative:       Receiving Lab:->[facility]    AFB Culture & Smear [[id number]] Collected:  03/14/18 1137    Order Status:  Completed Specimen:  Bronchial Wash from Amniotic Fluid Updated:  04/26/18 0927     AFB Culture & Smear Culture in progress     AFB Culture & Smear Culture in progress     AFB CULTURE STAIN No acid fast bacilli seen.          I have reviewed all pertinent labs within the past 24 hours.    Estimated Creatinine Clearance: 25.2 mL/min (A) (based on SCr of 3.5 mg/dL (H)).    Diagnostic Results:  I have reviewed and interpreted all pertinent imaging results/findings within the past 24 hours.

## 2018-05-02 NOTE — PROGRESS NOTES
[facility]  Heart Transplant  Progress Note    Patient Name: Lv Crocker  MRN: [id number]  Admission Date: 3/11/2018  Hospital Length of Stay: 51 days  Attending Physician: Alexandr Hernández MD  Primary Care Provider: Philippe Mohr MD  Principal Problem:Acute respiratory failure with hypoxia    Subjective:     Interval History: patient changed to nocturnal tube feeds as he is eating 25% of meals    Continuous Infusions:   insulin (HUMAN R) infusion (adults) 0.8 Units/hr (05/02/18 1218)     Scheduled Meds:   sodium chloride 0.9%   Intravenous Once    acetaminophen  650 mg Oral TID    albumin human 25%  25 g Intravenous Once    cetirizine  5 mg Oral Daily    [START ON 5/4/2018] epoetin jose miguel (PROCRIT) injection  100 Units/kg (Dosing Weight) Intravenous Every Mon, Wed, Fri    escitalopram oxalate  20 mg Oral Daily    famotidine  20 mg Oral QHS    guaiFENesin  600 mg Oral BID    heparin (porcine)  5,000 Units Subcutaneous Q12H    insulin aspart U-100  3 Units Subcutaneous TIDWM    levothyroxine  137 mcg Oral Before breakfast    midodrine  10 mg Oral TID    mycophenolate  250 mg Oral BID    polyethylene glycol  17 g Oral BID    predniSONE  5 mg Oral Daily    QUEtiapine  50 mg Oral QHS    tacrolimus  5 mg Oral BID     PRN Meds:sodium chloride 0.9%, sodium chloride 0.9%, albuterol-ipratropium 2.5mg-0.5mg/3mL, ALPRAZolam, benzonatate, dextrose 50%, dextrose 50%, glucagon (human recombinant), glucose, glucose, heparin (porcine), insulin aspart U-100, midodrine, ondansetron, oxyCODONE    Review of patient's allergies indicates:   Allergen Reactions    No known drug allergies      Objective:     Vital Signs (Most Recent):  Temp: 98.7 °F (37.1 °C) (05/02/18 1113)  Pulse: (!) 119 (05/02/18 1128)  Resp: 16 (05/02/18 1113)  BP: (!) 104/49 (05/02/18 1113)  SpO2: (!) 92 % (05/02/18 1113) Vital Signs (24h Range):  Temp:  [98 °F (36.7 °C)-98.9 °F (37.2 °C)] 98.7 °F (37.1 °C)  Pulse:   [112-120] 119  Resp:  [16-20] 16  SpO2:  [92 %-98 %] 92 %  BP: ()/(46-87) 104/49     Patient Vitals for the past 72 hrs (Last 3 readings):   Weight   18 0600 74.6 kg (164 lb 7.4 oz)     Body mass index is 25.76 kg/m².      Intake/Output Summary (Last 24 hours) at 18 1432  Last data filed at 18 1300   Gross per 24 hour   Intake          1129.56 ml   Output               80 ml   Net          1049.56 ml       Hemodynamic Parameters:           Physical Exam   Constitutional: He is oriented to person, place, and time. He appears well-developed. No distress.   HENT:   Head: Normocephalic and atraumatic.   Right Ear: External ear normal.   Left Ear: External ear normal.   Nose: Nose normal.   Eyes: Right eye exhibits no discharge. Left eye exhibits no discharge. No scleral icterus.   Neck: Neck supple.   Trach intact.    Cardiovascular: Normal rate, regular rhythm and intact distal pulses.    Pulmonary/Chest: Effort normal. No respiratory distress. He has no wheezes.   Abdominal: Soft. He exhibits no distension. There is no tenderness.   PEG intact, incision LETICIA, CDI   Musculoskeletal: He exhibits no edema.        Right shoulder: He exhibits decreased range of motion and tenderness.   R foot: R great toe and R 3rd toe with necrotic tissue  L foot: small amount of necrotic tissue to L great toe  Overall deconditioning   Neurological: He is alert and oriented to person, place, and time.   -  Mental Status:  AAOx3.  Follows commands.  Answers correct age and .  Recent and remote memory intact.  -  Speech and language:  no aphasia or dysarthria.    -  Motor:  RUE: 3-/5, 3/5 .  LUE: 2+/5, 3/5 .  RLE: Proximal 2/5, distal 3+/5, DF 4-/5, PF 4-/5.  LLE: Proximal 2+/5, distal 4-/5, DF 3-/5, PF 4-/5.  -  Sensory:  Intact to light touch and pin prick.   Skin: Skin is warm and dry. No rash noted.   Psychiatric: His behavior is normal. Thought content normal. His affect is blunt.   Vitals  reviewed.      Significant Labs:  CBC:    Recent Labs  Lab 04/30/18 0615 05/01/18 0425 05/02/18  0447   WBC 4.74 5.18 5.82   RBC 2.41* 2.34* 2.25*   HGB 7.5* 7.3* 7.1*   HCT 24.2* 23.7* 22.7*    305 315   * 101* 101*   MCH 31.1* 31.2* 31.6*   MCHC 31.0* 30.8* 31.3*     BNP:  No results for input(s): BNP in the last 168 hours.    Invalid input(s): BNPTRIAGELBLO  CMP:    Recent Labs  Lab 04/30/18 0615 05/01/18 0426 05/02/18  0446    305* 220*   CALCIUM 10.3 9.0 9.7   ALBUMIN 2.3* 2.1* 2.1*   PROT 6.5 6.0 6.3   * 134* 132*   K 5.4* 4.1 4.5   CO2 20* 24 24   CL 95 96 94*   BUN 57* 26* 40*   CREATININE 4.1* 2.6* 3.5*   ALKPHOS 215* 210* 200*   ALT 14 15 13   AST 18 20 16   BILITOT 0.3 0.5 0.5      Coagulation:   No results for input(s): PT, INR, APTT in the last 168 hours.  LDH:  No results for input(s): LDH in the last 72 hours.  Microbiology:  Microbiology Results (last 7 days)     Procedure Component Value Units Date/Time    Fungus culture [[id number]] Collected:  03/28/18 1607    Order Status:  Completed Specimen:  Body Fluid from Lung, RLL Updated:  05/01/18 1347     Fungus (Mycology) Culture No fungus isolated after 4 weeks    Culture, Respiratory with Gram Stain [[id number]] Collected:  04/26/18 1329    Order Status:  Completed Specimen:  Respiratory from Tracheal Aspirate Updated:  04/30/18 1253     Respiratory Culture Normal respiratory hank     Gram Stain (Respiratory) <10 epithelial cells per low power field.     Gram Stain (Respiratory) No WBC's     Gram Stain (Respiratory) Rare Gram negative rods    Respiratory Viral Panel by PCR [facility]; Nasal Swab [[id number]] Collected:  04/26/18 1123    Order Status:  Completed Specimen:  Respiratory Updated:  04/27/18 1110     Respiratory Virus Panel, source Nasal Swab     RVP - Adenovirus Not Detected     Comment: Detects Serotypes B and E. Detection of Serotype C may   be limited. If Adenovirus infection is suspected and a   Not Detected  result is returned the sample should be   re-tested for Adenovirus using an independent method  (e.g. [employer] Adenovirus Quantitative Real-Time  PCR test.          Enterovirus Not Detected     Comment: Cross-reactivity has been observed between certain Rhinovirus  strains and the Enterovirus assay.          Human Bocavirus Not Detected     Human Coronavirus Not Detected     Comment: The Human Coronavirus assay detects Human coronavirus types  229E, OC43,NL63 and HKU1.          RVP - Human Metapneumovirus (hMPV) Not Detected     RVP - Influenza A Not Detected     Influenza A - T6M1-13 Not Detected     RVP - Influenza B Not Detected     Parainfluenza Not Detected     Respiratory Syncytial VirusVirus (RSV) A Not Detected     Comment: The Respiratory Syncytial Viral assay detects types A and B,  however it does not distinguish between the two.          RVP - Rhinovirus Not Detected     Comment: Cross-Reactivity has been observed between certain   Rhinovirus strains and the Enterovirus assay.  Target Enriched Mulitplex Polymerase Chain Reaction (TEM-PCR)  allows for the detection of multiple pathogens out of a single  reaction.  This test was developed and its performance   characteristics determined by [employer].  It has not   been cleared or approved by the U.S.Food and Drug Administration.  Results should be used in conjunction with clinical findings,   and should not form the sole basis for a diagnosis or treatment  decision.  TEM-PCR is a licensed technology of [employer].         Narrative:       Receiving Lab:->[facility]    AFB Culture & Smear [[id number]] Collected:  03/14/18 1137    Order Status:  Completed Specimen:  Bronchial Wash from Amniotic Fluid Updated:  04/26/18 0927     AFB Culture & Smear Culture in progress     AFB Culture & Smear Culture in progress     AFB CULTURE STAIN No acid fast bacilli seen.          I have reviewed all pertinent labs within the past 24  hours.    Estimated Creatinine Clearance: 25.2 mL/min (A) (based on SCr of 3.5 mg/dL (H)).    Diagnostic Results:  I have reviewed and interpreted all pertinent imaging results/findings within the past 24 hours.    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 Most recently has had ongoing issues with his lungs, had gotten 2 thoracenteses, after which he underwent VATS 01/19/18 followed by pleurex catheter placement and effusion drainage 01/11/18, subsequently had it removed 02/07/18 after drainage had decreased despite multiple attempts to drain it. Has been having significant coughing fits that result in him being near-syncopal at times, although difficult to say if he has passed out or not- patient most recently was admitted to the hospital for increased AGUILAR, coughing and pre-syncope 02/11-02/14/18 at [facility].   Patient was started on antibiotics for suspected bacterial infection, with some diarrhea, bronchitis, and possible pneumonia. Ct chest showed some pleural fluid collection and after talking with Dr. James, we arranged for him to receive a diagnostic tap with IR, from which the fluid collection demonstrated no growth or significant findings at all really. Cell counts do not appear to have been sent but will recheck. Patient states since his hospital stay, yesterday had a significant coughing fit again, after which he nearly passed out, is being seen in clinic today for this. Denies any cardiopulmonary complaints, no leg swelling, has some abdominal swelling, which is moderate for him. Denies significant shortness of breath with mild exertion, had 6MWT yesterday to see if he qualified for home O2, and his O2 sats actually improved after recovery from where he started initially. He and his wife admit he is in bed  most days, not very active.     Mr. Crocker presented to the ED 3/11/18 with approximately 3 weeks of worsening diarrhea and 2-3 days of sinus pressure, drainage, and worsened cough.  He notes that he had a coughing fit last night and passed out, coughed throughout most of the night.  This also happened on 2/25/18.  He last saw Dr Ferreira in clinic on 2/20/18 where he was taken off myfortic and switched to cellcept (he hadn't been on cellcept because of leukopenia when they tried post-transplant).  Though his diarrhea had improved after his last discharge, he states it has increased now to 15x/day, clear/yellow/green, no fevers, no exacerbating foods per say.  He was taken back off the cellcept and placed back on myfortic this past week and has not noticed immediate change in diarrhea. He also reports his baseline coughing fits havent improved and are minimally responsive to tessalon pearls.  Came on last night, he lost consciousness during a fit once which is relatively normal for him, and had two more during HPI.  He notes no sick contacts but does state he's had some URI symptoms as above.  His baseline vitals are usually -120 and BP 90s/60s-110s/70s.  He reports a history of intermittent diarrhea - did have Cdiff many years ago but this smells different.  No abdominal pain, no nausea, no vomiting.  No blood in diarrhea.  Appears last c-scope in 2015 with no evidence of infection or inflammatory processes.  He does report IBS which is different that this.       * Acute respiratory failure with hypoxia    - Resolved. S/P Bronch and intubation 3/14 now post tracheostomy due to inability to extubate  - Etiology = RSV   - Pulmonology signed off. Completely weaned off vent.   - Appreciate PT/OT/Wound care.        Gastroparesis    - abdominal pain improved from weekend  - BM x4 on 4/25, passing gas  - KUB without evidence of bowel obstructionunclear if  worsening gastroparesis or new partial obstruction  - Per GI  recs patient should tolerate up to 400 mL residuals. Will wean narcotics. Can complete CT AP if needed however pain is mild and improving at this point  - CMV PCR undetected        Alteration in skin integrity    - wound care following        Restrictive cardiomyopathy    - No changes (see above)         Type 1 diabetes mellitus with stage 3 chronic kidney disease    - Appreciate Endocrine's recs  - Insulin gtt per endocrine recs. Adjust as needed  - per endocrine please call when he is off tube feeds to adjust ggt and transition to rehab/outpatient regimen        Hypothyroidism due to Hashimoto's thyroiditis    - Appreciate Endocrine's recs  - Switched to PO Levothyroxine 75mcg daily        Acute renal failure with acute tubular necrosis superimposed on stage 3 chronic kidney disease    - Appreciate Nephrology recs.   - Continue middorine  - HD per nephrology.         Anemia    - Last transfusion 4/11  - Hgb stable but low   - Nephrology has resumed ProCrit         Heart transplanted    - TTE 4/24/18 showed normal graft function but has known history of restrictive CM post transplant.  - Continue Prednisone 5mg daily and increase Tacrolimus to 4/4 with plan to adjust as needed for goal 6-10  - Tacro level pending this AM   - resume low dose cellcept today        Debility    - PT/OT/SLP daily. Recommending rehab.  - Nutrition following.   - Passed MBSS with speech. Started regular diet with thin liquids, but will continue tube feeds nocturnal until eating 75% of meals         Anxiety    - Increased Lexapro to 20mg daily  - Continue seroquel 50 QHS   - Continue .5mg xanax PRN Q8H   - Appreciate psychiatry input.             JOSE DANIEL Zabala  Heart Transplant  [facility]

## 2018-05-02 NOTE — PROGRESS NOTES
[facility]  Nephrology  Progress Note    Patient Name: Lv Crocker  MRN: [id number]  Admission Date: 3/11/2018  Hospital Length of Stay: 51 days  Attending Provider: Alexandr Hernández MD   Primary Care Physician: Philippe Mohr MD  Principal Problem:Acute respiratory failure with hypoxia    Subjective:     HPI: Mr. Crocker is a 43 yo WM with T1DM, Hashimoto thyroiditis, h/o OHTx 11/2014, and CKD stage 4 who was admitted on 3/11/18 for persistent diarrhea. He reported a 3 week history of diarrhea up to 15x/day. Associated symptoms including URI symptoms, coughing fits, and coughing syncope. Prograf level was 14.3. His post-heart transplant course has been complicated by AMR 4/2015, CMV, post-transplant restrictive cardiomyopathy, recurrent pleural effusions/ascites requiring monthly thoracenteses/paracenteses, VATS 1/11/18 with Pleur-X catheter (now removed), and CKD stage 4 with baseline sCr 2.6-3.0. Baseline -120s and baseline BP 90s-110s/60-70s. Upon admission he was started on IVF and diarrhea workup was initiated. On 3/12 he was noted to have decreased UOP despite fluids; NS was increased to 100cc/hr. He was scheduled for a colonoscopy on 3/13 however procedure was cancelled due to hypoxia and fever. He was transferred to the ICU for closer monitoring. He continued to have oliguria overnight. Bladder scan revealed 200cc of urine but patient refused osullivan catheter placement. Wife reports that patient always has a hard time urinating again after osullivan catheters are placed/removed so he refuses them. He remained hypoxic despite FiO2 70% and ABG revealed combined respiratory and metabolic acidosis with pH 7.17. He was started on BiPAP as well as a bicarb infusion at 50cc/hr. ABG with mild improvement. AM labs revealed K 6.2 and he was shifted and given kayexalate.Trialysis catheter was placed this morning and he subsequently developed hypotension and was started on levophed. He  was intubated later this morning. Nephrology consulted for CACHORRO. All history obtained by primary team and chart review as patient was intubated/sedated on exam. Consent for dialysis obtained by patient's wife and placed in chart. Of note, both of patient's parents were on dialysis.     Interval History: Seen in [facility]. SBP 's . Denies chest pain and shortness of breath. Tolerated 2 L of UF. .     Review of patient's allergies indicates:   Allergen Reactions    No known drug allergies      Current Facility-Administered Medications   Medication Frequency    0.9%  NaCl infusion PRN    0.9%  NaCl infusion PRN    0.9%  NaCl infusion Once    acetaminophen tablet 650 mg TID    albumin human 25% bottle 25 g Once    albuterol-ipratropium 2.5mg-0.5mg/3mL nebulizer solution 3 mL Q4H PRN    ALPRAZolam tablet 0.5 mg Q8H PRN    benzonatate capsule 100 mg TID PRN    cetirizine tablet 5 mg Daily    dextrose 50% injection 12.5 g PRN    dextrose 50% injection 25 g PRN    epoetin jose miguel injection 4,000 Units Every Mon, Wed, Fri    escitalopram oxalate tablet 20 mg Daily    famotidine tablet 20 mg QHS    glucagon (human recombinant) injection 1 mg PRN    glucose chewable tablet 16 g PRN    glucose chewable tablet 24 g PRN    guaiFENesin 12 hr tablet 600 mg BID    heparin (porcine) injection 1,000 Units PRN    heparin (porcine) injection 5,000 Units Q12H    insulin aspart U-100 pen 0-5 Units QID (AC + HS) PRN    insulin aspart U-100 pen 3 Units TIDWM    insulin regular (Humulin R) 100 Units in sodium chloride 0.9% 100 mL infusion Continuous    levothyroxine tablet 137 mcg Before breakfast    midodrine tablet 10 mg TID    midodrine tablet 15 mg PRN    mycophenolate capsule 250 mg BID    ondansetron disintegrating tablet 4 mg Q6H PRN    oxyCODONE immediate release tablet 5 mg Q6H PRN    polyethylene glycol packet 17 g BID    predniSONE tablet 5 mg Daily    QUEtiapine tablet 50 mg QHS    tacrolimus  capsule 1 mg Once    tacrolimus capsule 5 mg BID       Objective:     Vital Signs (Most Recent):  Temp: 98 °F (36.7 °C) (05/02/18 0655)  Pulse: (!) 112 (05/02/18 1000)  Resp: 18 (05/02/18 1000)  BP: (!) 90/47 (05/02/18 1000)  SpO2: 95 % (05/02/18 0822)  O2 Device (Oxygen Therapy): room air (05/02/18 0822) Vital Signs (24h Range):  Temp:  [97.9 °F (36.6 °C)-98.9 °F (37.2 °C)] 98 °F (36.7 °C)  Pulse:  [112-120] 112  Resp:  [16-20] 18  SpO2:  [93 %-98 %] 95 %  BP: ()/(46-87) 90/47     Weight: 74.6 kg (164 lb 7.4 oz) (05/02/18 0600)  Body mass index is 25.76 kg/m².  Body surface area is 1.88 meters squared.    I/O last 3 completed shifts:  In: 559.6 [P.O.:300; I.V.:29.6; NG/GT:230]  Out: 0     Physical Exam   Constitutional: He appears well-developed. No distress.   Trach   HENT:   Head: Normocephalic and atraumatic.   Eyes: Conjunctivae and EOM are normal.   Cardiovascular: Regular rhythm.  Tachycardia present.    Pulmonary/Chest: He has no wheezes. He has no rales.   L IJ TDC   Abdominal: Soft. He exhibits no distension.   LUQ PEG  Midline incision with staples intact   Neurological: He is alert.   Skin:   Right necrotic toes 1-3  Left great toe necrotic-same       Significant Labs:  All labs within the past 24 hours have been reviewed.     Significant Imaging:  Labs: Reviewed    Assessment/Plan:     Acute renal failure with acute tubular necrosis superimposed on stage 3 chronic kidney disease    - baseline sCr 2.6-3.0 consistent with CKD stage IV  - anuric CACHORRO likely ischemic ATN from prolonged pre-renal state (diarrhea) in setting of prograf renal vasoconstriction; cannot r/o septic ATN or Prograf toxicity  - SLED started 3/14. TDC placed 4/4/18.   - remains anuric  -Per Physical Med & Rehab, patient approve for [facility] inpatient rehab (opens May 1) when off insulin gtt. SW to assisting to outpatient HD that will take patient with Trach.    -Continue epo  -5/2 Patient seen in [facility]. Tolerated 2 L UF. Albumin  and midodrine PRN. Continue MWF HD while inpatient.     Anemia of CKD  -Iron 70, T sat 35, TIBC 225 and ferritan 1287  -Increase epo starting next HD.      BMD  Lab Results   Component Value Date    PTH 23.0 04/19/2018    CALCIUM 9.7 05/02/2018    CAION 0.98 (L) 03/17/2018    PHOS 3.1 05/02/2018                 Thank you for your consult. I will follow-up with patient. Please contact us if you have any additional questions.    Amanda Cuellar NP  Nephrology  [facility]

## 2018-05-02 NOTE — SUBJECTIVE & OBJECTIVE
Interval History: Seen in [facility]. SBP 's . Denies chest pain and shortness of breath. Tolerated 2 L of UF. .     Review of patient's allergies indicates:   Allergen Reactions    No known drug allergies      Current Facility-Administered Medications   Medication Frequency    0.9%  NaCl infusion PRN    0.9%  NaCl infusion PRN    0.9%  NaCl infusion Once    acetaminophen tablet 650 mg TID    albumin human 25% bottle 25 g Once    albuterol-ipratropium 2.5mg-0.5mg/3mL nebulizer solution 3 mL Q4H PRN    ALPRAZolam tablet 0.5 mg Q8H PRN    benzonatate capsule 100 mg TID PRN    cetirizine tablet 5 mg Daily    dextrose 50% injection 12.5 g PRN    dextrose 50% injection 25 g PRN    epoetin jose miguel injection 4,000 Units Every Mon, Wed, Fri    escitalopram oxalate tablet 20 mg Daily    famotidine tablet 20 mg QHS    glucagon (human recombinant) injection 1 mg PRN    glucose chewable tablet 16 g PRN    glucose chewable tablet 24 g PRN    guaiFENesin 12 hr tablet 600 mg BID    heparin (porcine) injection 1,000 Units PRN    heparin (porcine) injection 5,000 Units Q12H    insulin aspart U-100 pen 0-5 Units QID (AC + HS) PRN    insulin aspart U-100 pen 3 Units TIDWM    insulin regular (Humulin R) 100 Units in sodium chloride 0.9% 100 mL infusion Continuous    levothyroxine tablet 137 mcg Before breakfast    midodrine tablet 10 mg TID    midodrine tablet 15 mg PRN    mycophenolate capsule 250 mg BID    ondansetron disintegrating tablet 4 mg Q6H PRN    oxyCODONE immediate release tablet 5 mg Q6H PRN    polyethylene glycol packet 17 g BID    predniSONE tablet 5 mg Daily    QUEtiapine tablet 50 mg QHS    tacrolimus capsule 1 mg Once    tacrolimus capsule 5 mg BID       Objective:     Vital Signs (Most Recent):  Temp: 98 °F (36.7 °C) (05/02/18 0655)  Pulse: (!) 112 (05/02/18 1000)  Resp: 18 (05/02/18 1000)  BP: (!) 90/47 (05/02/18 1000)  SpO2: 95 % (05/02/18 0822)  O2 Device (Oxygen Therapy):  room air (05/02/18 0822) Vital Signs (24h Range):  Temp:  [97.9 °F (36.6 °C)-98.9 °F (37.2 °C)] 98 °F (36.7 °C)  Pulse:  [112-120] 112  Resp:  [16-20] 18  SpO2:  [93 %-98 %] 95 %  BP: ()/(46-87) 90/47     Weight: 74.6 kg (164 lb 7.4 oz) (05/02/18 0600)  Body mass index is 25.76 kg/m².  Body surface area is 1.88 meters squared.    I/O last 3 completed shifts:  In: 559.6 [P.O.:300; I.V.:29.6; NG/GT:230]  Out: 0     Physical Exam   Constitutional: He appears well-developed. No distress.   Trach   HENT:   Head: Normocephalic and atraumatic.   Eyes: Conjunctivae and EOM are normal.   Cardiovascular: Regular rhythm.  Tachycardia present.    Pulmonary/Chest: He has no wheezes. He has no rales.   L IJ TDC   Abdominal: Soft. He exhibits no distension.   LUQ PEG  Midline incision with staples intact   Neurological: He is alert.   Skin:   Right necrotic toes 1-3  Left great toe necrotic-same       Significant Labs:  All labs within the past 24 hours have been reviewed.     Significant Imaging:  Labs: Reviewed

## 2018-05-02 NOTE — PROGRESS NOTES
Dialysis complete.  Blood returned.     Left chest wqall CVC flushed * 2 with       Locked with cap and tape.  No s/s infection at cvc site saline and heparin .   Pt tolerated hemodialysis without diff.  Pt ran    Hrs on hemodialysis machine.   Took off  2000 Ml net volume.      Used F-160 dialyzer.

## 2018-05-02 NOTE — PLAN OF CARE
Problem: Patient Care Overview  Goal: Plan of Care Review  Outcome: Ongoing (interventions implemented as appropriate)  Pt aao x 4. Pt currently in bed with bed in lowest/locked position. Call light in reach. Free from falls/injury this shift. Encouraged to call for assistance when needed. Family at bedside. Pt turned often in bed. Podiatry by to see patient in HD this morning. Pt received xanax and oxycodone in HD. Pt worked with Therapy today, new pressure wound found to patient's left heel by therapist. Wound care re-consulted. Heel protector boots on. BG 100s-200s. Insulin gtt titrated per orders, now infusing @ 0.8 units/hr. Nocturnal TF from 8p-8a. Pt eating about 25%-50% of meals. New midline placed to GAIL.     Will continue to monitor, assess, and adjust care as needed.

## 2018-05-02 NOTE — SUBJECTIVE & OBJECTIVE
Interval History 5/2/2018:  Patient is seen for follow-up rehab evaluation and recommendations: No acute events over night.  HD this morning.  Participating with therapy.  Barriers for discharge/rehab admission: HD chair, insurance approval     HPI, Past Medical, Family, and Social History remains the same as documented in the initial encounter.    Scheduled Medications:    sodium chloride 0.9%   Intravenous Once    acetaminophen  650 mg Oral TID    albumin human 25%  25 g Intravenous Once    cetirizine  5 mg Oral Daily    [START ON 5/4/2018] epoetin jose miguel (PROCRIT) injection  100 Units/kg (Dosing Weight) Intravenous Every Mon, Wed, Fri    escitalopram oxalate  20 mg Oral Daily    famotidine  20 mg Oral QHS    guaiFENesin  600 mg Oral BID    heparin (porcine)  5,000 Units Subcutaneous Q12H    insulin aspart U-100  3 Units Subcutaneous TIDWM    levothyroxine  137 mcg Oral Before breakfast    midodrine  10 mg Oral TID    mycophenolate  250 mg Oral BID    polyethylene glycol  17 g Oral BID    predniSONE  5 mg Oral Daily    QUEtiapine  50 mg Oral QHS    tacrolimus  1 mg Oral Once    tacrolimus  5 mg Oral BID     PRN Medications: sodium chloride 0.9%, sodium chloride 0.9%, albuterol-ipratropium 2.5mg-0.5mg/3mL, ALPRAZolam, benzonatate, dextrose 50%, dextrose 50%, glucagon (human recombinant), glucose, glucose, heparin (porcine), insulin aspart U-100, midodrine, ondansetron, oxyCODONE    Review of Systems   Constitutional: Positive for activity change. Negative for chills, fatigue and fever.   HENT: Negative for drooling, hearing loss, trouble swallowing and voice change.    Eyes: Negative for pain and visual disturbance.   Respiratory: Negative for cough and wheezing.         + trach   Cardiovascular: Negative for chest pain and palpitations.   Gastrointestinal: Negative for abdominal pain, nausea and vomiting.        + PEG, positive acid reflux.    Genitourinary: Negative for difficulty urinating and  flank pain.   Musculoskeletal: Positive for arthralgias, gait problem and myalgias. Negative for back pain and neck pain.   Skin: Positive for color change and wound. Negative for rash.   Allergic/Immunologic: Positive for immunocompromised state.   Neurological: Positive for weakness. Negative for dizziness, numbness and headaches.   Psychiatric/Behavioral: Positive for sleep disturbance (improving). Negative for agitation and hallucinations. The patient is not nervous/anxious.      Objective:     Vital Signs (Most Recent):  Temp: 98.7 °F (37.1 °C) (18 1113)  Pulse: (!) 120 (18 1113)  Resp: 16 (18 1113)  BP: (!) 104/49 (18 1113)  SpO2: (!) 92 % (18 1113)    Vital Signs (24h Range):  Temp:  [97.9 °F (36.6 °C)-98.9 °F (37.2 °C)] 98.7 °F (37.1 °C)  Pulse:  [112-120] 120  Resp:  [16-20] 16  SpO2:  [92 %-98 %] 92 %  BP: ()/(46-87) 104/49     Physical Exam   Constitutional: He is oriented to person, place, and time. He appears well-developed. No distress.   HENT:   Head: Normocephalic and atraumatic.   Right Ear: External ear normal.   Left Ear: External ear normal.   Nose: Nose normal.   Eyes: Right eye exhibits no discharge. Left eye exhibits no discharge. No scleral icterus.   Neck: Neck supple.   Trach intact.    Cardiovascular: Normal rate, regular rhythm and intact distal pulses.    Pulmonary/Chest: Effort normal. No respiratory distress. He has no wheezes.   Abdominal: Soft. He exhibits no distension. There is no tenderness.   PEG intact, incision LETICIA, CDI   Musculoskeletal: He exhibits no edema.        Right shoulder: He exhibits decreased range of motion and tenderness.   R foot: R great toe and R 3rd toe with necrotic tissue  L foot: small amount of necrotic tissue to L great toe  Overall deconditioning   Neurological: He is alert and oriented to person, place, and time.   -  Mental Status:  AAOx3.  Follows commands.  Answers correct age and .  Recent and remote memory  intact.  -  Speech and language:  no aphasia or dysarthria.    -  Motor:  RUE: 3-/5, 3/5 .  LUE: 2+/5, 3/5 .  RLE: Proximal 2/5, distal 3+/5, DF 4-/5, PF 4-/5.  LLE: Proximal 2+/5, distal 4-/5, DF 3-/5, PF 4-/5.  -  Sensory:  Intact to light touch and pin prick.   Skin: Skin is warm and dry. No rash noted.   Psychiatric: His behavior is normal. Thought content normal. His affect is blunt.   Vitals reviewed.    Diagnostic Results:   Labs: Reviewed  X-Ray: Reviewed  US: Reviewed  CT: Reviewed  NEUROLOGICAL EXAMINATION:     MENTAL STATUS   Oriented to person, place, and time.

## 2018-05-02 NOTE — ASSESSMENT & PLAN NOTE
- baseline sCr 2.6-3.0 consistent with CKD stage IV  - anuric CACHORRO likely ischemic ATN from prolonged pre-renal state (diarrhea) in setting of prograf renal vasoconstriction; cannot r/o septic ATN or Prograf toxicity  - SLED started 3/14. TDC placed 4/4/18.   - remains anuric  -Per Physical Med & Rehab, patient approve for [facility] inpatient rehab (opens May 1) when off insulin gtt. SW to assisting to outpatient HD that will take patient with Trach.    -Continue epo  -5/2 Patient seen in [facility]. Tolerated 2 L UF. Albumin and midodrine PRN. Continue MWF HD while inpatient.     Anemia of CKD  -Iron 70, T sat 35, TIBC 225 and ferritan 1287  -Increase epo starting next HD.      BMD  Lab Results   Component Value Date    PTH 23.0 04/19/2018    CALCIUM 9.7 05/02/2018    CAION 0.98 (L) 03/17/2018    PHOS 3.1 05/02/2018

## 2018-05-02 NOTE — PLAN OF CARE
Problem: Physical Therapy Goal  Goal: Physical Therapy Goal  Goals to be met by: 18     Patient will increase functional independence with mobility by performin. Supine to sit with Moderate Assistance.  2. Sit to supine with Moderate Assistance.  3. Rolling to Left and Right with Minimal Assistance.   4. Sit to stand transfer with Moderate Assistance.  5. Bed to chair transfer with Maximum Assistance.  6. Gait  x 5 feet with Minimal Assistance.   7.  Pt to perf and be compliant with LE HEP to improve vascularity and mm strength.           Outcome: Ongoing (interventions implemented as appropriate)  Goals reviewed and remain appropriate. Pt progressing towards goals.    Petra Lake, PT, DPT   2018  [phone]

## 2018-05-03 PROBLEM — K31.84 GASTROPARESIS: Status: RESOLVED | Noted: 2018-01-01 | Resolved: 2018-01-01

## 2018-05-03 NOTE — PT/OT/SLP PROGRESS
Physical Therapy Treatment    Patient Name:  Lv Crocker   MRN:  [id number]    Recommendations:     Discharge Recommendations:  rehabilitation facility   Discharge Equipment Recommendations:  (TBD)   Barriers to discharge: Decreased caregiver support    Assessment:     Lv Crocker is a 42 y.o. male admitted with a medical diagnosis of Acute respiratory failure with hypoxia.  He presents with the following impairments/functional limitations:  weakness, impaired endurance, impaired self care skills, impaired functional mobilty, gait instability, impaired balance, decreased coordination, decreased upper extremity function, decreased lower extremity function, decreased safety awareness, pain, impaired fine motor, impaired skin, impaired cardiopulmonary response to activity requiring max assist for bed mobility, max assist x2 for transfers, and max assist x2 to take a few steps to transfer bed to chair. Pt did not tolerate sitting in chair well today due to increased pain at coccyx due to wound but did state WB through BLE was more tolerable with darco shoes.     Rehab Prognosis: good ; patient would benefit from acute skilled PT services to address these deficits and reach maximum level of function.      Recent Surgery: Procedure(s) (LRB):  INSERTION-CATHETER-PERM-A-CATH (Left)  INSERTION-TUBE-GASTROSTOMY (N/A) 29 Days Post-Op    Plan:     During this hospitalization, patient to be seen 5 x/week to address the above listed problems via gait training, therapeutic activities, therapeutic exercises, neuromuscular re-education  · Plan of Care Expires:  06/01/18   Plan of Care Reviewed with: patient    Subjective     Communicated with pt and nurse prior to session.  Patient found supine in bed upon PT entry to room, agreeable to treatment.      Chief Complaint: pain in posterior neck and coccyx region when sitting in chair  Patient comments/goals: to improve mobility  Pain/Comfort:  · Pain Rating 1:  (did  not rate)  · Location - Side 1: Bilateral  · Location - Orientation 1: generalized  · Location 1: coccyx  · Pain Addressed 1: Pre-medicate for activity, Reposition, Distraction, Cessation of Activity, Nurse notified  · Pain Rating Post-Intervention 1: 0/10    Patients cultural, spiritual, Catholic conflicts given the current situation: none    Objective:     Patient found with: peripheral IV, PEG Tube, telemetry, tracheostomy     General Precautions: Standard, fall   Orthopedic Precautions:N/A   Braces:  (Darco shoes B feet)     Functional Mobility:  · Bed Mobility:     · Rolling Left:  maximal assistance  · Rolling Right: maximal assistance  · Scooting: maximal assistance and of 2 persons  · Supine to Sit: maximal assistance  · Sit to Supine: maximal assistance and of 2 persons  · Transfers:     · Sit to Stand:  maximal assistance and of 2 persons with no AD performed x5 trials  · Bed to chair: max assist x2 with no AD using step pivot technique  · Chair to bed: total assist x1 with no AD using squat pivot technique  · Gait: pt was able to take 3-4 steps during transfer from bed to chair with max assist x2 with significant forward trunk flexion, bilateral knee hyperextension, and decreased foot clearance and step length  · Balance: pt is able to maintain static sitting balance EOB with BUE support with SBA to CGA for safety and stability, pt requires max assist x2 to maintain static standing balance for approx 10 sec per trial x3 trials with pt demonstrating forward flexed posture with bilateral knee hyperextension. PT provided verbal and tactile instruction to achieve more upright posture with appropriate foot placement for stability       AM-PAC 6 CLICK MOBILITY  Turning over in bed (including adjusting bedclothes, sheets and blankets)?: 2  Sitting down on and standing up from a chair with arms (e.g., wheelchair, bedside commode, etc.): 2  Moving from lying on back to sitting on the side of the bed?: 2  Moving  to and from a bed to a chair (including a wheelchair)?: 2  Need to walk in hospital room?: 2  Climbing 3-5 steps with a railing?: 1  Total Score: 11       Therapeutic Activities and Exercises:  Seated therex including:  Ankle pumps 10  LAQ 10  Hip flexion 10    PT provided gentle stretching to bilateral gastro/soleus      PT provided pt education on:   -role of PT and POC   -importance of OOB activity   -LE exercises to perform independently    Patient left supine with call button in reach and family and nurse present..    GOALS:    Physical Therapy Goals        Problem: Physical Therapy Goal    Goal Priority Disciplines Outcome Goal Variances Interventions   Physical Therapy Goal     PT/OT, PT Ongoing (interventions implemented as appropriate)     Description:  Goals to be met by: 18     Patient will increase functional independence with mobility by performin. Supine to sit with Moderate Assistance.  2. Sit to supine with Moderate Assistance.  3. Rolling to Left and Right with Minimal Assistance.   4. Sit to stand transfer with Moderate Assistance.  5. Bed to chair transfer with Maximum Assistance.  6. Gait  x 5 feet with Minimal Assistance.   7.  Pt to perf and be compliant with LE HEP to improve vascularity and mm strength.                            Time Tracking:     PT Received On: 18  PT Start Time: 1022     PT Stop Time: 1122  PT Total Time (min): 60 min     Billable Minutes: Therapeutic Activity 15, Therapeutic Exercise 15 and Neuromuscular Re-education 30    Treatment Type: Treatment  PT/PTA: PT     PTA Visit Number: 0     Brandy Robb, PT  2018

## 2018-05-03 NOTE — ASSESSMENT & PLAN NOTE
- TTE 4/24/18 showed normal graft function but has known history of restrictive CM post transplant.  - Continue Prednisone 5mg daily and increase Tacrolimus 5/5 with plan to adjust as needed for goal 6-10  - Tacro level pending this AM   - resumed low dose cellcept yesterday

## 2018-05-03 NOTE — PLAN OF CARE
Problem: Occupational Therapy Goal  Goal: Occupational Therapy Goal  Goals to be met by: 5/8/18    Pt will follow 75% of motor commands with <2 verbal cues for repetition of task to maximize engagement in grooming task.--MET  Pt will complete feeding with mod A.   Pt will complete supine with HOB slightly elevated to seated at EOB with mod A in prep for seated grooming task.  Pt will engage in BUE exercises in orders to increase strength to 3+/5 in BUE's to increase engagement in seated grooming task  Pt will sit at EOB for approx 10 minutes with mod A and unilateral UE support to complete grooming task  Pt will complete sit>stand from EOB with bed in lowest position with mod A in prep for [place] to [facility]      Outcome: Ongoing (interventions implemented as appropriate)  Pt progressing well towards all goals  Continue POC    Leonor Wagner OTR/L  [phone]  5/3/2018

## 2018-05-03 NOTE — PROGRESS NOTES
[facility]  Endocrinology  Progress Note    Admit Date: 3/11/2018     Reason for Consult: abnormal TFTs    Surgical Procedure and Date: s/p heart transplant      Diabetes diagnosis year: 7yo (T1DM)     Home Diabetes Medications:    Levemir 15u qHS  Novolog 4u AC     How often checking glucose at home? 4 times daily   BG readings on regimen: 150-200s  Hypoglycemia on the regimen?  Yes, rarely - treats appropriately (light snack, glucose tab)  Missed doses on regimen?  No        Diabetes Complications include:     Hyperglycemia, Hypoglycemia  and Diabetic chronic kidney disease          Complicating diabetes co morbidities:   N/A     HPI:   Patient is a 42 y.o. male with a diagnosis of T1DM, HTN, hypothyroidism, s/p heart transplant.  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 He required serial paracentesis and thoracentesis until he underwent a VATS with pleurX catheter placement on 2018 and removed 18.       Presented to hospital secondary to diarrhea and cough.  Upon initial labs, TSH was decreased (0.101) and free T4 1.33.  Endocrinology consulted to assist in management of these labs.  He was last seen in clinic by Kamala Vázquez NP 2017.   Previous hospitalization, endocrine consulted for same problem.  During that visit, recommended decreasing LT4 to 125mcg daily. Unfortunately, patient was discharged on previous dose of 150mcg daily.     Interval HPI:   Overnight events: TF changed to nocturnal only per patient. Currently off feeds. BG down to 72 at beginning of TF last night, increased to 200s this AM at conclusion of feeds.  Eatin%  Nausea: No  Hypoglycemia and intervention: Yes, d50 x 1  Fever: No  TPN and/or TF: Yes  If yes, type of TF/TPN and rate: Novasource renal @ 30/hr - 8p-8a    BP (!) 103/55 (BP Location: Left arm, Patient Position: Lying)   Pulse  "(!) 111   Temp 98.1 °F (36.7 °C) (Oral)   Resp 18   Ht 5' 7" (1.702 m)   Wt 74.7 kg (164 lb 10.9 oz)   SpO2 97%   BMI 25.79 kg/m²       Labs Reviewed and Include      Recent Labs  Lab 05/03/18  0500   *   CALCIUM 9.0   ALBUMIN 2.0*   PROT 6.3   *   K 3.6   CO2 23   CL 98   BUN 22*   CREATININE 2.3*   ALKPHOS 197*   ALT 12   AST 17   BILITOT 0.4     Lab Results   Component Value Date    WBC 4.97 05/03/2018    HGB 6.9 (L) 05/03/2018    HCT 22.3 (L) 05/03/2018     (H) 05/03/2018     05/03/2018     No results for input(s): TSH, FREET4 in the last 168 hours.  Lab Results   Component Value Date    HGBA1C 5.1 04/05/2018       Nutritional status:   Body mass index is 25.79 kg/m².  Lab Results   Component Value Date    ALBUMIN 2.0 (L) 05/03/2018    ALBUMIN 2.1 (L) 05/02/2018    ALBUMIN 2.1 (L) 05/01/2018     Lab Results   Component Value Date    PREALBUMIN 13 (L) 04/08/2018    PREALBUMIN 5 (L) 03/15/2018    PREALBUMIN 18 (L) 03/24/2015       Estimated Creatinine Clearance: 38.3 mL/min (A) (based on SCr of 2.3 mg/dL (H)).    Accu-Checks  Recent Labs      05/01/18   1816  05/01/18   2038  05/02/18   0219  05/02/18   0702  05/02/18   1217  05/02/18   1716  05/02/18   2048  05/02/18   2243  05/03/18   0310  05/03/18   0747   POCTGLUCOSE  221*  174*  197*  230*  110  116*  72  174*  183*  263*       Current Medications and/or Treatments Impacting Glycemic Control  Immunotherapy:  Immunosuppressants         Stop Route Frequency     tacrolimus capsule 5 mg      -- Oral 2 times daily     mycophenolate capsule 250 mg      -- Oral 2 times daily        Steroids:   Hormones     Start     Stop Route Frequency Ordered    05/01/18 0900  predniSONE tablet 5 mg      -- Oral Daily 04/30/18 1513        Pressors:    Autonomic Drugs     Start     Stop Route Frequency Ordered    04/30/18 0705  midodrine tablet 15 mg      -- Oral As needed (PRN) 04/30/18 0707    04/25/18 0900  midodrine tablet 10 mg      -- Oral 3 " times daily 04/25/18 0650        Hyperglycemia/Diabetes Medications: Antihyperglycemics     Start     Stop Route Frequency Ordered    05/03/18 1130  insulin aspart U-100 pen 2 Units      -- SubQ 3 times daily with meals 05/03/18 0956    04/26/18 2000  insulin aspart U-100 pen 0-5 Units      -- SubQ Before meals & nightly PRN 04/26/18 1904    04/11/18 0015  insulin regular (Humulin R) 100 Units in sodium chloride 0.9% 100 mL infusion     Question:  Insulin Rate Adjustment (DO NOT MODIFY ANSWER)  Answer:  [email or web address]\epic\Images\Pharmacy\InsulinInfusions\InsulinRegAdj [id number]    -- IV Continuous 04/10/18 2313          ASSESSMENT and PLAN    * Acute respiratory failure with hypoxia    Per primary  S/p trach.  Practicing with speech valve.  Now back on diet.        Current chronic use of systemic steroids    Can increase insulin requirement        CKD (chronic kidney disease), stage IV    Titrate cautiously.  Caution with insulin stacking.  Avoid hypoglycemia.        Type 1 diabetes mellitus with stage 3 chronic kidney disease    BG goal 140-180,       Will need change in infusion rates when TF on:  0.8 units/hr when tube feeds on (8p-8a)  0.5 units/hr when tube feeds off (8a-8p)  Decrease novolog 2 units with meals  Low dose correction  POC glucose ac/hs/0200    Notify endocrine for changes in nutrition status (diet, TF, TPN)    Pt is T1DM, do not suspend insulin >1 hr   If TF held, decrease insulin rate to 0.5u/hr     Discharge Recommendations:  Will need scheduled novolog q4 or regular q6 for TF and will need basal insulin for T1DM         Acute renal failure with acute tubular necrosis superimposed on stage 3 chronic kidney disease    Avoid insulin stacking              Corey Johnson MD  Endocrinology  [facility]

## 2018-05-03 NOTE — SUBJECTIVE & OBJECTIVE
Interval History: No complaints this am. Trach in place    Continuous Infusions:   insulin (HUMAN R) infusion (adults) 0.5 Units/hr (05/03/18 0956)     Scheduled Meds:   sodium chloride 0.9%   Intravenous Once    acetaminophen  650 mg Oral TID    albumin human 25%  25 g Intravenous Once    cetirizine  5 mg Oral Daily    [START ON 5/4/2018] epoetin jose miguel (PROCRIT) injection  100 Units/kg (Dosing Weight) Intravenous Every Mon, Wed, Fri    escitalopram oxalate  20 mg Oral Daily    famotidine  20 mg Oral QHS    guaiFENesin  600 mg Oral BID    heparin (porcine)  5,000 Units Subcutaneous Q12H    insulin aspart U-100  2 Units Subcutaneous TIDWM    levothyroxine  137 mcg Oral Before breakfast    midodrine  10 mg Oral TID    mycophenolate  250 mg Oral BID    polyethylene glycol  17 g Oral BID    predniSONE  5 mg Oral Daily    QUEtiapine  50 mg Oral QHS    tacrolimus  5 mg Oral BID     PRN Meds:sodium chloride 0.9%, sodium chloride 0.9%, albuterol-ipratropium 2.5mg-0.5mg/3mL, ALPRAZolam, benzonatate, dextrose 50%, dextrose 50%, glucagon (human recombinant), glucose, glucose, heparin (porcine), insulin aspart U-100, midodrine, ondansetron, oxyCODONE    Review of patient's allergies indicates:   Allergen Reactions    No known drug allergies      Objective:     Vital Signs (Most Recent):  Temp: 98.3 °F (36.8 °C) (05/03/18 1159)  Pulse: (!) 112 (05/03/18 1418)  Resp: 16 (05/03/18 1159)  BP: 110/63 (05/03/18 1418)  SpO2: 95 % (05/03/18 1159) Vital Signs (24h Range):  Temp:  [98 °F (36.7 °C)-99.4 °F (37.4 °C)] 98.3 °F (36.8 °C)  Pulse:  [109-118] 112  Resp:  [16-18] 16  SpO2:  [94 %-97 %] 95 %  BP: ()/(50-63) 110/63     Patient Vitals for the past 72 hrs (Last 3 readings):   Weight   05/03/18 0500 74.7 kg (164 lb 10.9 oz)   05/02/18 0600 74.6 kg (164 lb 7.4 oz)     Body mass index is 25.79 kg/m².      Intake/Output Summary (Last 24 hours) at 05/03/18 1447  Last data filed at 05/03/18 1009   Gross per 24 hour    Intake          1185.84 ml   Output                0 ml   Net          1185.84 ml       Hemodynamic Parameters:           Physical Exam   Constitutional: He is oriented to person, place, and time. He appears well-developed and well-nourished.   Neck: Normal range of motion. Neck supple. No JVD present.   Cardiovascular: Normal rate and regular rhythm.  Exam reveals no gallop and no friction rub.    No murmur heard.  Pulmonary/Chest: Effort normal and breath sounds normal. He has no wheezes. He has no rales.   Trach in place   Abdominal: Soft. Bowel sounds are normal. There is no tenderness.   Musculoskeletal: He exhibits no edema.   Neurological: He is alert and oriented to person, place, and time.   Skin: Skin is warm and dry.   RLE- 1st and 3rd gangrenous toes       Significant Labs:  CBC:    Recent Labs  Lab 05/01/18  0425 05/02/18  0447 05/03/18  0500 05/03/18  0758   WBC 5.18 5.82 4.97  --    RBC 2.34* 2.25* 2.17*  --    HGB 7.3* 7.1* 6.7* 6.9*   HCT 23.7* 22.7* 22.7* 22.3*    315 287  --    * 101* 105*  --    MCH 31.2* 31.6* 30.9  --    MCHC 30.8* 31.3* 29.5*  --      BNP:  No results for input(s): BNP in the last 168 hours.    Invalid input(s): BNPTRIAGELBLO  CMP:    Recent Labs  Lab 05/01/18  0426 05/02/18  0446 05/03/18  0500   * 220* 236*   CALCIUM 9.0 9.7 9.0   ALBUMIN 2.1* 2.1* 2.0*   PROT 6.0 6.3 6.3   * 132* 135*   K 4.1 4.5 3.6   CO2 24 24 23   CL 96 94* 98   BUN 26* 40* 22*   CREATININE 2.6* 3.5* 2.3*   ALKPHOS 210* 200* 197*   ALT 15 13 12   AST 20 16 17   BILITOT 0.5 0.5 0.4      Coagulation:   No results for input(s): PT, INR, APTT in the last 168 hours.  LDH:  No results for input(s): LDH in the last 72 hours.  Microbiology:  Microbiology Results (last 7 days)     Procedure Component Value Units Date/Time    Fungus culture [[id number]] Collected:  03/28/18 1607    Order Status:  Completed Specimen:  Body Fluid from Lung, RLL Updated:  05/01/18 1347     Fungus  (Mycology) Culture No fungus isolated after 4 weeks    Culture, Respiratory with Gram Stain [[id number]] Collected:  04/26/18 1329    Order Status:  Completed Specimen:  Respiratory from Tracheal Aspirate Updated:  04/30/18 1253     Respiratory Culture Normal respiratory hank     Gram Stain (Respiratory) <10 epithelial cells per low power field.     Gram Stain (Respiratory) No WBC's     Gram Stain (Respiratory) Rare Gram negative rods    Respiratory Viral Panel by PCR [facility]; Nasal Swab [[id number]] Collected:  04/26/18 1123    Order Status:  Completed Specimen:  Respiratory Updated:  04/27/18 1110     Respiratory Virus Panel, source Nasal Swab     RVP - Adenovirus Not Detected     Comment: Detects Serotypes B and E. Detection of Serotype C may   be limited. If Adenovirus infection is suspected and a   Not Detected result is returned the sample should be   re-tested for Adenovirus using an independent method  (e.g. [facility] Adenovirus Quantitative Real-Time  PCR test.          Enterovirus Not Detected     Comment: Cross-reactivity has been observed between certain Rhinovirus  strains and the Enterovirus assay.          Human Bocavirus Not Detected     Human Coronavirus Not Detected     Comment: The Human Coronavirus assay detects Human coronavirus types  229E, OC43,NL63 and HKU1.          RVP - Human Metapneumovirus (hMPV) Not Detected     RVP - Influenza A Not Detected     Influenza A - [id number] Not Detected     RVP - Influenza B Not Detected     Parainfluenza Not Detected     Respiratory Syncytial VirusVirus (RSV) A Not Detected     Comment: The Respiratory Syncytial Viral assay detects types A and B,  however it does not distinguish between the two.          RVP - Rhinovirus Not Detected     Comment: Cross-Reactivity has been observed between certain   Rhinovirus strains and the Enterovirus assay.  Target Enriched Mulitplex Polymerase Chain Reaction (TEM-PCR)  allows for the detection of multiple pathogens  out of a single  reaction.  This test was developed and its performance   characteristics determined by [employer]  It has not   been cleared or approved by the U.S.Food and Drug Administration.  Results should be used in conjunction with clinical findings,   and should not form the sole basis for a diagnosis or treatment  decision.  TEM-PCR is a licensed technology of [employer].         Narrative:       Receiving Lab:->[facility]          I have reviewed all pertinent labs within the past 24 hours.    Estimated Creatinine Clearance: 38.3 mL/min (A) (based on SCr of 2.3 mg/dL (H)).    Diagnostic Results:  I have reviewed all pertinent imaging results/findings within the past 24 hours.

## 2018-05-03 NOTE — PROGRESS NOTES
[facility]  Physical Medicine & Rehab  Progress Note    Patient Name: Lv Crocker  MRN: [id number]  Admission Date: 3/11/2018  Length of Stay: 52 days  Attending Physician: Alexandr Hernández MD    Subjective:     Principal Problem:Acute respiratory failure with hypoxia    Hospital Course:   3/24/18:  Evaluated by PT and OT.  Bed mobility and transfers deferred 2/2 intubation and CRRT.   3/26/18:  Participated with PT.  Bed mobility totalA-dependent.  EOB totalA.  3/28/18:  Participated with PT and OT.  Bed mobility totalA-dependent.  ADLs totalA.   4/16/18:  Participated with PT and OT.  Bed mobility max-totalA/dependent.  Sit to stand totalA and transfers totalA.  EOB mod-totalA.  UBD totalA and LBD totalA.  4/17/18:  Participated with PT and SLP.  Bed mobility total-dependent.  EOB x 15 minutes mod-maxA.   4/18/18:  Participated with OT.  Bed mobility totalA.  EOB x 15 minutes totalA.    4/19/18:  Participated with PT, OT, and SLP.  Found to have cognitive-linguistic impairment and dysphonia.  Remains NPO.  Bed mobility total/dependent.  EOB x 24 minutes (static sit x ~10 seconds + ~5 seconds SV) mod-maxA.  No sit to stand, transfers, or gait.  ADLs totalA.   4/22/18:  Participated with PT and OT.  Bed mobility totalA.  EOB maxA.  Sit to stand max-totalA and transfers max-totalA.  LBD maxA.  4/23/18:  No PT or OT 2/2 HD.   4/24/18:  Participated with PT.  Bed mobility maxA.  Sit to stand and transfers maxA.  EOB maxA.   4/25/18:  Participated with OT.  Bed mobility totalA.  Bed to medi chair transfer dependent (totalA x 2).  EOB x 20 minutes maxA.  ADLs totalA.    4/26/18:  Passed MBSS.  SLP recommendation: regular diet and thin liquids (with a chin tuck in isolation of food).  Participated with PT and OT.  Bed mobility totalA.  Declined OOB activty with therapy 2/2 waiting on family to eat and wanted to be in bed for meal.  Medi chair in afternoon.  4/27/18:  Participated with PT, OT,  and SLP.  Bed mobility totalA-dependent.  Sit to stand totalA-dependent.  EOB SBA-maxA.  Standing balance totalA.   4/28/18:  Participated with PT.  Bed mobility totalA-dependent.    4/30/18:  Participated with PT and OT.  Bed mobility totalA (maxA x 2).  Declined EOB 2/2 pain and fatigue from HD.  UBD totalA and LBD totalA.    5/1/18:  Participated with PT, OT, and SLP.  Bed mobility max-totalA.  EOB SV-[place].  Sit to stand totalA (maxA x 2) and transfers dependent (totalA x 2).  UBD totalA.  5/2/18:  Participated with PT and OT.  Bed mobility max-dependent (totalA x 2).  EOB CGA-modA.  Sit to stand totalA (maxA x 2).  Stood x 25-30 seconds totalA (maxA x 2).  LBD totalA.    Interval History 5/3/2018:  Patient is seen for follow-up rehab evaluation and recommendations: No acute events over night.  Without new complaints.  Participating with therapy.  Barriers for discharge/rehab admission: HD chair, insurance approval     HPI, Past Medical, Family, and Social History remains the same as documented in the initial encounter.    Scheduled Medications:    sodium chloride 0.9%   Intravenous Once    acetaminophen  650 mg Oral TID    albumin human 25%  25 g Intravenous Once    cetirizine  5 mg Oral Daily    [START ON 5/4/2018] epoetin jose miguel (PROCRIT) injection  100 Units/kg (Dosing Weight) Intravenous Every Mon, Wed, Fri    escitalopram oxalate  20 mg Oral Daily    famotidine  20 mg Oral QHS    guaiFENesin  600 mg Oral BID    heparin (porcine)  5,000 Units Subcutaneous Q12H    insulin aspart U-100  2 Units Subcutaneous TIDWM    levothyroxine  137 mcg Oral Before breakfast    midodrine  10 mg Oral TID    mycophenolate  250 mg Oral BID    polyethylene glycol  17 g Oral BID    predniSONE  5 mg Oral Daily    QUEtiapine  50 mg Oral QHS    tacrolimus  5 mg Oral BID       PRN Medications: sodium chloride 0.9%, sodium chloride 0.9%, albuterol-ipratropium 2.5mg-0.5mg/3mL, ALPRAZolam, benzonatate, dextrose 50%,  dextrose 50%, glucagon (human recombinant), glucose, glucose, heparin (porcine), insulin aspart U-100, midodrine, ondansetron, oxyCODONE    Review of Systems   Constitutional: Positive for activity change. Negative for chills, fatigue and fever.   HENT: Negative for drooling, hearing loss, trouble swallowing and voice change.    Eyes: Negative for pain and visual disturbance.   Respiratory: Negative for cough and wheezing.         + trach   Cardiovascular: Negative for chest pain and palpitations.   Gastrointestinal: Negative for abdominal pain, nausea and vomiting.        + PEG, positive acid reflux.    Genitourinary: Negative for difficulty urinating and flank pain.   Musculoskeletal: Positive for arthralgias, gait problem and myalgias. Negative for back pain and neck pain.   Skin: Positive for color change and wound. Negative for rash.   Allergic/Immunologic: Positive for immunocompromised state.   Neurological: Positive for weakness. Negative for dizziness, numbness and headaches.   Psychiatric/Behavioral: Positive for sleep disturbance (improving). Negative for agitation and hallucinations. The patient is not nervous/anxious.      Objective:     Vital Signs (Most Recent):  Temp: 98.1 °F (36.7 °C) (05/03/18 0740)  Pulse: (!) 118 (05/03/18 1009)  Resp: 18 (05/03/18 1009)  BP: (!) 103/55 (05/03/18 0740)  SpO2: 95 % (05/03/18 1009)    Vital Signs (24h Range):  Temp:  [98 °F (36.7 °C)-99.4 °F (37.4 °C)] 98.1 °F (36.7 °C)  Pulse:  [109-120] 118  Resp:  [16-18] 18  SpO2:  [92 %-97 %] 95 %  BP: ()/(49-60) 103/55     Physical Exam   Constitutional: He is oriented to person, place, and time. He appears well-developed. No distress.   HENT:   Head: Normocephalic and atraumatic.   Right Ear: External ear normal.   Left Ear: External ear normal.   Nose: Nose normal.   Eyes: Right eye exhibits no discharge. Left eye exhibits no discharge. No scleral icterus.   Neck: Neck supple.   Trach intact.    Cardiovascular: Normal  "rate, regular rhythm and intact distal pulses.    Pulmonary/Chest: Effort normal. No respiratory distress. He has no wheezes.   Abdominal: Soft. He exhibits no distension. There is no tenderness.   PEG intact, incision LETICIA, CDI   Musculoskeletal: He exhibits no edema.        Right shoulder: He exhibits decreased range of motion and tenderness.   R foot: R great toe and R 3rd toe with necrotic tissue  L foot: small amount of necrotic tissue to L great toe  Overall deconditioning   Neurological: He is alert and oriented to person, place, and time.   -  Mental Status:  AAOx3.  Follows commands.  Answers correct age and .  Recent and remote memory intact.  -  Speech and language:  no aphasia or dysarthria.    -  Motor:  RUE: 3-/5, 3/5 .  LUE: 2+/5, 3/5 .  RLE: Proximal 2/5, distal 3+/5, DF 4-/5, PF 4-/5.  LLE: Proximal 2+/5, distal 4-/5, DF 3-/5, PF 4-/5.  -  Sensory:  Intact to light touch and pin prick.   Skin: Skin is warm and dry. No rash noted.   Psychiatric: His behavior is normal. Thought content normal. His affect is blunt.   Vitals reviewed.    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 Ischemic changes to right second toe and left hallux  -  Wound care following  -  Podiatry following         Severe malnutrition    -  S/p PEG placement on 18  -  On continuous TF        Type 1 diabetes mellitus with stage 3 chronic kidney disease    -  Endocrine following  -  On insulin gtt        Acute renal failure with acute tubular necrosis superimposed on stage 3 chronic kidney disease    -  Nephrology following "anuric CACHORRO; likely ischemic ATN 2/2 prolonged pre-renal " "state (diarrhea) in setting of prograf renal vasoconstriction; cannot r/o septic ATN or Prograf toxicity"  -  On HD  -  S/p perm-a-cath placement on 4/4/18        Heart transplanted    -  S/p OHTX 11/18/2014 with early post-op course complicated by hemorrhagic tamponade s/p  washout, delayed closure, pericardial window with subsequent a-fib with RVR and CACHORRO and late course complicated by ABMR (in 4/2015 s/p rituxan, PLEX, and IVIG), C.diff/CMV reactivation, CKD, and restrictive cardiomyopathy with subsequent chronic/recurrent pleural effusions and ascites previously requiring monthly paracentesis and thoracentesis until he underwent VATS with pleurX catheter placement on 1/11/2018 with removal on 2/7/2018        Debility    -  2/2 prolonged and acute hospital course  -  (I) ADLs and mobility prior to admission, limited by fatigue/endurance  Recommendations  -  Encourage mobility, OOB in chair, and early ambulation as appropriate  -  PT/OT evaluate and treat  -  Pain management  -  Monitor for and prevent skin breakdown and pressure ulcers  · Early mobility, repositioning/weight shifting every 20-30 minutes when sitting, turn patient every 2 hours, proper mattress/overlay and chair cushioning, pressure relief/heel protector boots  -  DVT prophylaxis:  MARK, SCD        Anxiety    -  Anxiety and sleeping difficulty   -  Psych consulted--> increased Lexapro to 20 mg, started Seroquel--> stopped xanax and nortriptyline 2/2 hallucinations        Patient approved for [facility].  Insurance and HD chair pending admission.     RENETTA Caldwell  Department of Physical Medicine & Rehab   [facility]  "

## 2018-05-03 NOTE — PROGRESS NOTES
[facility]  Heart Transplant  Progress Note    Patient Name: Lv Crocker  MRN: [id number]  Admission Date: 3/11/2018  Hospital Length of Stay: 52 days  Attending Physician: Alexandr Hernández MD  Primary Care Provider: Philippe Mohr MD  Principal Problem:Acute respiratory failure with hypoxia    Subjective:     Interval History: No complaints this am. Trach in place    Continuous Infusions:   insulin (HUMAN R) infusion (adults) 0.5 Units/hr (05/03/18 0956)     Scheduled Meds:   sodium chloride 0.9%   Intravenous Once    acetaminophen  650 mg Oral TID    albumin human 25%  25 g Intravenous Once    cetirizine  5 mg Oral Daily    [START ON 5/4/2018] epoetin jose miguel (PROCRIT) injection  100 Units/kg (Dosing Weight) Intravenous Every Mon, Wed, Fri    escitalopram oxalate  20 mg Oral Daily    famotidine  20 mg Oral QHS    guaiFENesin  600 mg Oral BID    heparin (porcine)  5,000 Units Subcutaneous Q12H    insulin aspart U-100  2 Units Subcutaneous TIDWM    levothyroxine  137 mcg Oral Before breakfast    midodrine  10 mg Oral TID    mycophenolate  250 mg Oral BID    polyethylene glycol  17 g Oral BID    predniSONE  5 mg Oral Daily    QUEtiapine  50 mg Oral QHS    tacrolimus  5 mg Oral BID     PRN Meds:sodium chloride 0.9%, sodium chloride 0.9%, albuterol-ipratropium 2.5mg-0.5mg/3mL, ALPRAZolam, benzonatate, dextrose 50%, dextrose 50%, glucagon (human recombinant), glucose, glucose, heparin (porcine), insulin aspart U-100, midodrine, ondansetron, oxyCODONE    Review of patient's allergies indicates:   Allergen Reactions    No known drug allergies      Objective:     Vital Signs (Most Recent):  Temp: 98.3 °F (36.8 °C) (05/03/18 1159)  Pulse: (!) 112 (05/03/18 1418)  Resp: 16 (05/03/18 1159)  BP: 110/63 (05/03/18 1418)  SpO2: 95 % (05/03/18 1159) Vital Signs (24h Range):  Temp:  [98 °F (36.7 °C)-99.4 °F (37.4 °C)] 98.3 °F (36.8 °C)  Pulse:  [109-118] 112  Resp:  [16-18] 16  SpO2:   [94 %-97 %] 95 %  BP: ()/(50-63) 110/63     Patient Vitals for the past 72 hrs (Last 3 readings):   Weight   05/03/18 0500 74.7 kg (164 lb 10.9 oz)   05/02/18 0600 74.6 kg (164 lb 7.4 oz)     Body mass index is 25.79 kg/m².      Intake/Output Summary (Last 24 hours) at 05/03/18 1447  Last data filed at 05/03/18 1009   Gross per 24 hour   Intake          1185.84 ml   Output                0 ml   Net          1185.84 ml       Hemodynamic Parameters:           Physical Exam   Constitutional: He is oriented to person, place, and time. He appears well-developed and well-nourished.   Neck: Normal range of motion. Neck supple. No JVD present.   Cardiovascular: Normal rate and regular rhythm.  Exam reveals no gallop and no friction rub.    No murmur heard.  Pulmonary/Chest: Effort normal and breath sounds normal. He has no wheezes. He has no rales.   Trach in place   Abdominal: Soft. Bowel sounds are normal. There is no tenderness.   Musculoskeletal: He exhibits no edema.   Neurological: He is alert and oriented to person, place, and time.   Skin: Skin is warm and dry.   RLE- 1st and 3rd gangrenous toes       Significant Labs:  CBC:    Recent Labs  Lab 05/01/18  0425 05/02/18  0447 05/03/18  0500 05/03/18  0758   WBC 5.18 5.82 4.97  --    RBC 2.34* 2.25* 2.17*  --    HGB 7.3* 7.1* 6.7* 6.9*   HCT 23.7* 22.7* 22.7* 22.3*    315 287  --    * 101* 105*  --    MCH 31.2* 31.6* 30.9  --    MCHC 30.8* 31.3* 29.5*  --      BNP:  No results for input(s): BNP in the last 168 hours.    Invalid input(s): BNPTRIAGELBLO  CMP:    Recent Labs  Lab 05/01/18  0426 05/02/18  0446 05/03/18  0500   * 220* 236*   CALCIUM 9.0 9.7 9.0   ALBUMIN 2.1* 2.1* 2.0*   PROT 6.0 6.3 6.3   * 132* 135*   K 4.1 4.5 3.6   CO2 24 24 23   CL 96 94* 98   BUN 26* 40* 22*   CREATININE 2.6* 3.5* 2.3*   ALKPHOS 210* 200* 197*   ALT 15 13 12   AST 20 16 17   BILITOT 0.5 0.5 0.4      Coagulation:   No results for input(s): PT, INR, APTT  in the last 168 hours.  LDH:  No results for input(s): LDH in the last 72 hours.  Microbiology:  Microbiology Results (last 7 days)     Procedure Component Value Units Date/Time    Fungus culture [[id number]] Collected:  03/28/18 1607    Order Status:  Completed Specimen:  Body Fluid from Lung, RLL Updated:  05/01/18 1347     Fungus (Mycology) Culture No fungus isolated after 4 weeks    Culture, Respiratory with Gram Stain [[id number]] Collected:  04/26/18 1329    Order Status:  Completed Specimen:  Respiratory from Tracheal Aspirate Updated:  04/30/18 1253     Respiratory Culture Normal respiratory hank     Gram Stain (Respiratory) <10 epithelial cells per low power field.     Gram Stain (Respiratory) No WBC's     Gram Stain (Respiratory) Rare Gram negative rods    Respiratory Viral Panel by PCR [facility]; Nasal Swab [[id number]] Collected:  04/26/18 1123    Order Status:  Completed Specimen:  Respiratory Updated:  04/27/18 1110     Respiratory Virus Panel, source Nasal Swab     RVP - Adenovirus Not Detected     Comment: Detects Serotypes B and E. Detection of Serotype C may   be limited. If Adenovirus infection is suspected and a   Not Detected result is returned the sample should be   re-tested for Adenovirus using an independent method  (e.g. [place] Adenovirus Quantitative Real-Time  PCR test.          Enterovirus Not Detected     Comment: Cross-reactivity has been observed between certain Rhinovirus  strains and the Enterovirus assay.          Human Bocavirus Not Detected     Human Coronavirus Not Detected     Comment: The Human Coronavirus assay detects Human coronavirus types  229E, OC43,NL63 and HKU1.          RVP - Human Metapneumovirus (hMPV) Not Detected     RVP - Influenza A Not Detected     Influenza A - K2R4-60 Not Detected     RVP - Influenza B Not Detected     Parainfluenza Not Detected     Respiratory Syncytial VirusVirus (RSV) A Not Detected     Comment: The Respiratory Syncytial Viral assay  detects types A and B,  however it does not distinguish between the two.          RVP - Rhinovirus Not Detected     Comment: Cross-Reactivity has been observed between certain   Rhinovirus strains and the Enterovirus assay.  Target Enriched Mulitplex Polymerase Chain Reaction (TEM-PCR)  allows for the detection of multiple pathogens out of a single  reaction.  This test was developed and its performance   characteristics determined by [employer].  It has not   been cleared or approved by the U.S.Food and Drug Administration.  Results should be used in conjunction with clinical findings,   and should not form the sole basis for a diagnosis or treatment  decision.  TEM-PCR is a licensed technology of [employer].         Narrative:       Receiving Lab:->[facility]          I have reviewed all pertinent labs within the past 24 hours.    Estimated Creatinine Clearance: 38.3 mL/min (A) (based on SCr of 2.3 mg/dL (H)).    Diagnostic Results:  I have reviewed all pertinent imaging results/findings within the past 24 hours.    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 Most recently has had ongoing issues with his lungs, had gotten 2 thoracenteses, after which he underwent VATS 01/19/18 followed by pleurex catheter placement and effusion drainage 01/11/18, subsequently had it removed 02/07/18 after drainage had decreased despite multiple attempts to drain it. Has been having significant coughing fits that result in him being near-syncopal at times, although difficult to say if he has passed out or not- patient most recently was admitted to the hospital for increased AGUILAR, coughing and pre-syncope 02/11-02/14/18 at [facility] [place] [facility].   Patient was started on antibiotics for suspected bacterial infection, with some diarrhea,  bronchitis, and possible pneumonia. Ct chest showed some pleural fluid collection and after talking with Dr. James, we arranged for him to receive a diagnostic tap with IR, from which the fluid collection demonstrated no growth or significant findings at all really. Cell counts do not appear to have been sent but will recheck. Patient states since his hospital stay, yesterday had a significant coughing fit again, after which he nearly passed out, is being seen in clinic today for this. Denies any cardiopulmonary complaints, no leg swelling, has some abdominal swelling, which is moderate for him. Denies significant shortness of breath with mild exertion, had 6MWT yesterday to see if he qualified for home O2, and his O2 sats actually improved after recovery from where he started initially. He and his wife admit he is in bed most days, not very active.     Mr. Crocker presented to the ED 3/11/18 with approximately 3 weeks of worsening diarrhea and 2-3 days of sinus pressure, drainage, and worsened cough.  He notes that he had a coughing fit last night and passed out, coughed throughout most of the night.  This also happened on 2/25/18.  He last saw Dr Ferreira in clinic on 2/20/18 where he was taken off myfortic and switched to cellcept (he hadn't been on cellcept because of leukopenia when they tried post-transplant).  Though his diarrhea had improved after his last discharge, he states it has increased now to 15x/day, clear/yellow/green, no fevers, no exacerbating foods per say.  He was taken back off the cellcept and placed back on myfortic this past week and has not noticed immediate change in diarrhea. He also reports his baseline coughing fits havent improved and are minimally responsive to tessalon pearls.  Came on last night, he lost consciousness during a fit once which is relatively normal for him, and had two more during HPI.  He notes no sick contacts but does state he's had some URI symptoms as above.   His baseline vitals are usually -120 and BP 90s/60s-110s/70s.  He reports a history of intermittent diarrhea - did have Cdiff many years ago but this smells different.  No abdominal pain, no nausea, no vomiting.  No blood in diarrhea.  Appears last c-scope in 2015 with no evidence of infection or inflammatory processes.  He does report IBS which is different that this.       * Acute respiratory failure with hypoxia    - Resolved. S/P Bronch and intubation 3/14 now post tracheostomy due to inability to extubate  - Etiology = RSV   - Pulmonology signed off. Completely weaned off vent.   - Appreciate PT/OT/Wound care.        Alteration in skin integrity    - wound care following        Restrictive cardiomyopathy    - No changes (see above)         Type 1 diabetes mellitus with stage 3 chronic kidney disease    - Appreciate Endocrine's recs  - Insulin gtt per endocrine recs. Adjust as needed  - per endocrine please call when he is off tube feeds to adjust ggt and transition to rehab/outpatient regimen        Hypothyroidism due to Hashimoto's thyroiditis    - Appreciate Endocrine's recs  - Switched to PO Levothyroxine 75mcg daily        Acute renal failure with acute tubular necrosis superimposed on stage 3 chronic kidney disease    - Appreciate Nephrology recs.   - Continue middorine  - HD per nephrology [facility].         Anemia    - Last transfusion 4/11  - Hgb stable but low   - Nephrology has resumed ProCrit         Heart transplanted    - TTE 4/24/18 showed normal graft function but has known history of restrictive CM post transplant.  - Continue Prednisone 5mg daily and increase Tacrolimus 5/5 with plan to adjust as needed for goal 6-10  - Tacro level pending this AM   - resumed low dose cellcept yesterday        Debility    - PT/OT/SLP daily. Recommending rehab.  - Nutrition following.   - Passed MBSS with speech. Started regular diet with thin liquids, but will continue tube feeds nocturnal until eating 75% of  meals         Anxiety    - Increased Lexapro to 20mg daily  - Continue seroquel 50 QHS   - Continue .5mg xanax PRN Q8H   - Appreciate psychiatry input.             Corinne Presley PALolisC  Heart Transplant  [facility]

## 2018-05-03 NOTE — PLAN OF CARE
Problem: Patient Care Overview  Goal: Plan of Care Review  Outcome: Ongoing (interventions implemented as appropriate)  Pt AAOx4, VSS, in NAD throughout shift.  Pt with sister-in-law at bedside, involved in and attentive to pt.  Pt tolerating all medications and interventions well.  Pt receiving insulin gtt throughout shift, pt tolerating well.  Blood glucose slightly elevated to 200's this shift.  Pt has not requested PRN meds this shift.  Pt with some eschar to bilateral feet, RN applied betadine to all jose guadalupe as ordered.  Pt with J-tube to LLQ, dressing is clean, dry, intact; 15cc residual with water irrigation, pt tolerating well.  Tube feed infusing overnight.  Pt with speaking valve in place to tracheostomy tube, communicates well.  RN turning pt from side to side throughout shift.  Pt denies pain or other need at this time; RN will continue to monitor, assess, and alter plan of care as needed until report given to oncoming night shift nurse.

## 2018-05-03 NOTE — SUBJECTIVE & OBJECTIVE
"Interval HPI:   Overnight events: TF changed to nocturnal only per patient. Currently off feeds. BG down to 72 at beginning of TF last night, increased to 200s this AM at conclusion of feeds.  Eatin%  Nausea: No  Hypoglycemia and intervention: Yes, d50 x 1  Fever: No  TPN and/or TF: Yes  If yes, type of TF/TPN and rate: Novasource renal @ 30/hr - 8p-8a    BP (!) 103/55 (BP Location: Left arm, Patient Position: Lying)   Pulse (!) 111   Temp 98.1 °F (36.7 °C) (Oral)   Resp 18   Ht 5' 7" (1.702 m)   Wt 74.7 kg (164 lb 10.9 oz)   SpO2 97%   BMI 25.79 kg/m²     Labs Reviewed and Include      Recent Labs  Lab 18  0500   *   CALCIUM 9.0   ALBUMIN 2.0*   PROT 6.3   *   K 3.6   CO2 23   CL 98   BUN 22*   CREATININE 2.3*   ALKPHOS 197*   ALT 12   AST 17   BILITOT 0.4     Lab Results   Component Value Date    WBC 4.97 2018    HGB 6.9 (L) 2018    HCT 22.3 (L) 2018     (H) 2018     2018     No results for input(s): TSH, FREET4 in the last 168 hours.  Lab Results   Component Value Date    HGBA1C 5.1 2018       Nutritional status:   Body mass index is 25.79 kg/m².  Lab Results   Component Value Date    ALBUMIN 2.0 (L) 2018    ALBUMIN 2.1 (L) 2018    ALBUMIN 2.1 (L) 2018     Lab Results   Component Value Date    PREALBUMIN 13 (L) 2018    PREALBUMIN 5 (L) 03/15/2018    PREALBUMIN 18 (L) 2015       Estimated Creatinine Clearance: 38.3 mL/min (A) (based on SCr of 2.3 mg/dL (H)).    Accu-Checks  Recent Labs      18   1816  18   2038  18   0219  18   0702  18   1217  18   1716  18   2048  18   2243  18   0310  18   0747   POCTGLUCOSE  221*  174*  197*  230*  110  116*  72  174*  183*  263*       Current Medications and/or Treatments Impacting Glycemic Control  Immunotherapy:  Immunosuppressants         Stop Route Frequency     tacrolimus capsule 5 mg      -- Oral 2 " times daily     mycophenolate capsule 250 mg      -- Oral 2 times daily        Steroids:   Hormones     Start     Stop Route Frequency Ordered    05/01/18 0900  predniSONE tablet 5 mg      -- Oral Daily 04/30/18 1513        Pressors:    Autonomic Drugs     Start     Stop Route Frequency Ordered    04/30/18 0705  midodrine tablet 15 mg      -- Oral As needed (PRN) 04/30/18 0707    04/25/18 0900  midodrine tablet 10 mg      -- Oral 3 times daily 04/25/18 0650        Hyperglycemia/Diabetes Medications: Antihyperglycemics     Start     Stop Route Frequency Ordered    05/03/18 1130  insulin aspart U-100 pen 2 Units      -- SubQ 3 times daily with meals 05/03/18 0956    04/26/18 2000  insulin aspart U-100 pen 0-5 Units      -- SubQ Before meals & nightly PRN 04/26/18 1904    04/11/18 0015  insulin regular (Humulin R) 100 Units in sodium chloride 0.9% 100 mL infusion     Question:  Insulin Rate Adjustment (DO NOT MODIFY ANSWER)  Answer:  [email or web address]    -- IV Continuous 04/10/18 2311

## 2018-05-03 NOTE — ASSESSMENT & PLAN NOTE
BG goal 140-180,       Will need change in infusion rates when TF on:  0.8 units/hr when tube feeds on (8p-8a)  0.5 units/hr when tube feeds off (8a-8p)  Decrease novolog 2 units with meals  Low dose correction  POC glucose /hs/0200    Notify endocrine for changes in nutrition status (diet, TF, TPN)    Pt is T1DM, do not suspend insulin >1 hr   If TF held, decrease insulin rate to 0.5u/hr     Discharge Recommendations:  Will need scheduled novolog q4 or regular q6 for TF and will need basal insulin for T1DM

## 2018-05-03 NOTE — SUBJECTIVE & OBJECTIVE
Interval History 5/3/2018:  Patient is seen for follow-up rehab evaluation and recommendations: No acute events over night.  Without new complaints.  Participating with therapy.  Barriers for discharge/rehab admission: HD chair, insurance approval     HPI, Past Medical, Family, and Social History remains the same as documented in the initial encounter.    Scheduled Medications:    sodium chloride 0.9%   Intravenous Once    acetaminophen  650 mg Oral TID    albumin human 25%  25 g Intravenous Once    cetirizine  5 mg Oral Daily    [START ON 5/4/2018] epoetin jose miguel (PROCRIT) injection  100 Units/kg (Dosing Weight) Intravenous Every Mon, Wed, Fri    escitalopram oxalate  20 mg Oral Daily    famotidine  20 mg Oral QHS    guaiFENesin  600 mg Oral BID    heparin (porcine)  5,000 Units Subcutaneous Q12H    insulin aspart U-100  2 Units Subcutaneous TIDWM    levothyroxine  137 mcg Oral Before breakfast    midodrine  10 mg Oral TID    mycophenolate  250 mg Oral BID    polyethylene glycol  17 g Oral BID    predniSONE  5 mg Oral Daily    QUEtiapine  50 mg Oral QHS    tacrolimus  5 mg Oral BID       PRN Medications: sodium chloride 0.9%, sodium chloride 0.9%, albuterol-ipratropium 2.5mg-0.5mg/3mL, ALPRAZolam, benzonatate, dextrose 50%, dextrose 50%, glucagon (human recombinant), glucose, glucose, heparin (porcine), insulin aspart U-100, midodrine, ondansetron, oxyCODONE    Review of Systems   Constitutional: Positive for activity change. Negative for chills, fatigue and fever.   HENT: Negative for drooling, hearing loss, trouble swallowing and voice change.    Eyes: Negative for pain and visual disturbance.   Respiratory: Negative for cough and wheezing.         + trach   Cardiovascular: Negative for chest pain and palpitations.   Gastrointestinal: Negative for abdominal pain, nausea and vomiting.        + PEG, positive acid reflux.    Genitourinary: Negative for difficulty urinating and flank pain.    Musculoskeletal: Positive for arthralgias, gait problem and myalgias. Negative for back pain and neck pain.   Skin: Positive for color change and wound. Negative for rash.   Allergic/Immunologic: Positive for immunocompromised state.   Neurological: Positive for weakness. Negative for dizziness, numbness and headaches.   Psychiatric/Behavioral: Positive for sleep disturbance (improving). Negative for agitation and hallucinations. The patient is not nervous/anxious.      Objective:     Vital Signs (Most Recent):  Temp: 98.1 °F (36.7 °C) (18 0740)  Pulse: (!) 118 (18 1009)  Resp: 18 (18 1009)  BP: (!) 103/55 (18 0740)  SpO2: 95 % (18 1009)    Vital Signs (24h Range):  Temp:  [98 °F (36.7 °C)-99.4 °F (37.4 °C)] 98.1 °F (36.7 °C)  Pulse:  [109-120] 118  Resp:  [16-18] 18  SpO2:  [92 %-97 %] 95 %  BP: ()/(49-60) 103/55     Physical Exam   Constitutional: He is oriented to person, place, and time. He appears well-developed. No distress.   HENT:   Head: Normocephalic and atraumatic.   Right Ear: External ear normal.   Left Ear: External ear normal.   Nose: Nose normal.   Eyes: Right eye exhibits no discharge. Left eye exhibits no discharge. No scleral icterus.   Neck: Neck supple.   Trach intact.    Cardiovascular: Normal rate, regular rhythm and intact distal pulses.    Pulmonary/Chest: Effort normal. No respiratory distress. He has no wheezes.   Abdominal: Soft. He exhibits no distension. There is no tenderness.   PEG intact, incision LETICIA, CDI   Musculoskeletal: He exhibits no edema.        Right shoulder: He exhibits decreased range of motion and tenderness.   R foot: R great toe and R 3rd toe with necrotic tissue  L foot: small amount of necrotic tissue to L great toe  Overall deconditioning   Neurological: He is alert and oriented to person, place, and time.   -  Mental Status:  AAOx3.  Follows commands.  Answers correct age and .  Recent and remote memory intact.  -  Speech  and language:  no aphasia or dysarthria.    -  Motor:  RUE: 3-/5, 3/5 .  LUE: 2+/5, 3/5 .  RLE: Proximal 2/5, distal 3+/5, DF 4-/5, PF 4-/5.  LLE: Proximal 2+/5, distal 4-/5, DF 3-/5, PF 4-/5.  -  Sensory:  Intact to light touch and pin prick.   Skin: Skin is warm and dry. No rash noted.   Psychiatric: His behavior is normal. Thought content normal. His affect is blunt.   Vitals reviewed.    Diagnostic Results:   Labs: Reviewed  X-Ray: Reviewed  US: Reviewed  CT: Reviewed  NEUROLOGICAL EXAMINATION:     MENTAL STATUS   Oriented to person, place, and time.

## 2018-05-03 NOTE — PLAN OF CARE
Problem: Physical Therapy Goal  Goal: Physical Therapy Goal  Goals to be met by: 18     Patient will increase functional independence with mobility by performin. Supine to sit with Moderate Assistance.  2. Sit to supine with Moderate Assistance.  3. Rolling to Left and Right with Minimal Assistance.   4. Sit to stand transfer with Moderate Assistance.  5. Bed to chair transfer with Maximum Assistance.  6. Gait  x 5 feet with Minimal Assistance.   7.  Pt to perf and be compliant with LE HEP to improve vascularity and mm strength.           Outcome: Ongoing (interventions implemented as appropriate)  Goals remain appropriate

## 2018-05-03 NOTE — PT/OT/SLP PROGRESS
Occupational Therapy   Treatment    Name: Lv Crocker  MRN: [id number]  Admitting Diagnosis:  Acute respiratory failure with hypoxia  29 Days Post-Op    Recommendations:     Discharge Recommendations: rehabilitation facility  Discharge Equipment Recommendations:   (TBD)  Barriers to discharge:  Inaccessible home environment, Decreased caregiver support    Subjective     Communicated with: RN prior to session. Pt found resting in bed with family member present. Pt agreeable to therapy session.     Pain/Comfort:  · Pain Rating 1:  (Pt did not rate )  · Location - Side 1: Bilateral  · Location - Orientation 1: generalized  · Location 1:  (buttock )  · Pain Addressed 1: Reposition, Distraction, Cessation of Activity    Patients cultural, spiritual, Taoist conflicts given the current situation: none stated     Objective:     Patient found with: telemetry, PEG Tube, peripheral IV, tracheostomy, pressure relief boots    General Precautions: Standard, fall   Orthopedic Precautions:N/A   Braces:  (DARCO shoes B feet with OOB mobility. )     Occupational Performance:    Bed Mobility:    · Patient completed Rolling/Turning to Left with  maximal assistance  · Patient completed Scooting/Bridging with maximal assistance and for anterior scooting towards EOB.   · Patient completed Supine to Sit with maximal assistance and with HOB elevated and assist for trunk elevation and min B LE management   · Patient completed Sit to Supine with total assistance     Functional Mobility/Transfers:  · Patient completed x 5 reps Sit <> Stand Transfer ( x 2 from EOB and x 3 from cardiac chair with 2 reps for repositioning) with maximal assistance and of 2 persons  with  no assistive device  · Patient completed Bed > cardiac Chair Transfer using Stand Pivot technique taking a few steps towards chair with maximal assistance and of 2 persons with no assistive device. Cardiac chair > bed with total A x 2 person with squat pivot  transfer  · Functional Mobility: Pt able to take small steps towards chair during stand pivot transfer with max A for standing balance and verbal/physical cues for upright posture     Activities of Daily Living:  · UB Dressing: maximal assistance to don gown like tyronee sitting EOB  · LB Dressing: total assistance to don/doff B  socks and DARCO shoes to B feet. Education provided for donning DARCO shoes properly.     Patient left up in cardiac chair for <1 hour. Returned to assist pt back to bed and left in R side-lying with all lines intact, call button in reach and RN and family member present    [facility] 6 Click:  [facility] Total Score: 7    Treatment & Education:  - Contacted podiatry resident to order pt pair of DARCO boot for B feet. Boots received prior to therapy session.   - Informed podiatry resident of development of pressure ulcer on L heel and R posterior portion of ankle.   - Pt performed stand pivot transfer from bed <> medichair with max A x 2 persons. Pt c/o L buttock pain and pain at pressure ulcer site. Therapist attempted to provide relief by placing cushion and pillow to elevate pressure on site. Unable to successfully position pt in tolerable position.  Pt only able to tolerate < 1 hr of sitting up in medichair due to increased pain through buttock. Medichair unable to be reclined for better position/support 2* chair not being fully charged. Therapist multiple times for repositioning in chair but highly unsuccessful. RN and team aware of pressure ulcer site.   - pt performed x 10 reps of AAROM exercises for B UEs sitting [facility]. Pt performed the following exercises with active assist from OT: crossbody punches, and bicep curls.   Pt performed the following exercises x 10 reps of AROM for B UEs: gross grasp squeezed with hand made resistance ball, forearm supination/pronation, and wrist flexion/extiension in against gravity position.    - Pt performed x 12 reps of scapular retraction for strengthening  of upper trapezius and rhomboids.   - Hand controls on medi-chair not working properly. Therapy returned to assist pt back to bed.   - Whiteboard updated   Education:    Assessment:     Lv Crocker is a 42 y.o. male with a medical diagnosis of Acute respiratory failure with hypoxia.  He presents with performance deficits affecting function are weakness, impaired endurance, impaired self care skills, impaired functional mobilty, gait instability, decreased upper extremity function, decreased lower extremity function, impaired balance, pain, impaired skin, impaired joint extensibility, impaired cardiopulmonary response to activity.Pt highly motivated to sit UIC OOB but unable to tolerate duration 2* pain at pressure ulcer site on buttock.  Pt tolerated session well despite limited time sitting UIC this date. Pt is progressing well towards all goals however continues to require increased assist for all mobility and self-care tasks. Pt will continue to benefit from skilled OT in order to improve return to PLOF.  Anticipate d/c Rehab once medically appropriate.         Rehab Prognosis:  Good; patient would benefit from acute skilled OT services to address these deficits and reach maximum level of function.       Plan:     Patient to be seen 5 x/week to address the above listed problems via therapeutic activities, self-care/home management, therapeutic exercises, neuromuscular re-education  · Plan of Care Expires: 05/31/18  · Plan of Care Reviewed with: patient    This Plan of care has been discussed with the patient who was involved in its development and understands and is in agreement with the identified goals and treatment plan    GOALS:    Occupational Therapy Goals        Problem: Occupational Therapy Goal    Goal Priority Disciplines Outcome Interventions   Occupational Therapy Goal     OT, PT/OT Ongoing (interventions implemented as appropriate)    Description:  Goals to be met by: 5/8/18    Pt will  follow 75% of motor commands with <2 verbal cues for repetition of task to maximize engagement in grooming task.--MET  Pt will complete feeding with mod A.   Pt will complete supine with HOB slightly elevated to seated at EOB with mod A in prep for seated grooming task.  Pt will engage in BUE exercises in orders to increase strength to 3+/5 in BUE's to increase engagement in seated grooming task  Pt will sit at EOB for approx 10 minutes with mod A and unilateral UE support to complete grooming task  Pt will complete sit>stand from EOB with bed in lowest position with mod A in prep for transfer to [facility]                       Time Tracking:     OT Date of Treatment: 05/03/18  OT Start Time: 1020  OT returned: 1148  OT Stop Time: 1123 OT ended: 1158  OT Total Time (min): 63 min + 10 mins =73 mins total time  (co-treat with PT)     Billable Minutes:Self Care/Home Management 15  Therapeutic Activity 30  Therapeutic Exercise 15    Leonor Wagner, OT  5/3/2018

## 2018-05-04 NOTE — PROGRESS NOTES
Pt in/out cath.  5 ml of concentrated yellow urine obtained.  Sterile technique used. Pt tolerated well. Pt reports having gotten paracentesis frequently in the past and was concerned this could be the issue.

## 2018-05-04 NOTE — PLAN OF CARE
Problem: Occupational Therapy Goal  Goal: Occupational Therapy Goal  Goals to be met by: 5/8/18    Pt will follow 75% of motor commands with <2 verbal cues for repetition of task to maximize engagement in grooming task.--MET  Pt will complete feeding with mod A.   Pt will complete supine with HOB slightly elevated to seated at EOB with mod A in prep for seated grooming task.  Pt will engage in BUE exercises in orders to increase strength to 3+/5 in BUE's to increase engagement in seated grooming task  Pt will sit at EOB for approx 10 minutes with mod A and unilateral UE support to complete grooming task  Pt will complete sit>stand from EOB with bed in lowest position with mod A in prep for transfer to [facility]      Outcome: Ongoing (interventions implemented as appropriate)  con't with goals.  Delia To, OTR

## 2018-05-04 NOTE — PLAN OF CARE
Problem: Diabetes, Type 1 (Adult)  Goal: Signs and Symptoms of Listed Potential Problems Will be Absent, Minimized or Managed (Diabetes, Type 1)  Signs and symptoms of listed potential problems will be absent, minimized or managed by discharge/transition of care (reference Diabetes, Type 1 (Adult) CPG).   Outcome: Ongoing (interventions implemented as appropriate)  Pt AAOx4. Pt went to inpatient dialysis this morning at 0730.  Pt came back to floor at 1330. Pt blood sugar checked and was 95. Endocrinology Dr. Johnson notified.  Instructed to hold meal time insulin but continue continuous at same rate (0.6)  Pt ate lunch and was rechecked. Blood sugar was 104 and was instructed to hold meal time insulin and to continue with continuous insulin drip.  Pt tolerating fine and showing no signs of hypoglycemia.  Pt had 2 bowel movements today. Pt request prn respiratory treatment. Pt remained free from falls and injuries during this shift. Order to bladder scan pt. Pt scanned, bladder scanner shows evidence of urine in bladder. Pt refused in/out cath with adult sized cath.  Pt request pediatric.  As soon as cath arrives will in/out cath patient and notify Dr. Patel with results.

## 2018-05-04 NOTE — PROGRESS NOTES
[facility]  Nephrology  Progress Note    Patient Name: Lv Crocker  MRN: [id number]  Admission Date: 3/11/2018  Hospital Length of Stay: 53 days  Attending Provider: Alexandr Hernández MD   Primary Care Physician: Philippe Mohr MD  Principal Problem:Acute respiratory failure with hypoxia    Subjective:     HPI: Mr. Crocker is a 43 yo WM with T1DM, Hashimoto thyroiditis, h/o OHTx 11/2014, and CKD stage 4 who was admitted on 3/11/18 for persistent diarrhea. He reported a 3 week history of diarrhea up to 15x/day. Associated symptoms including URI symptoms, coughing fits, and coughing syncope. Prograf level was 14.3. His post-heart transplant course has been complicated by AMR 4/2015, CMV, post-transplant restrictive cardiomyopathy, recurrent pleural effusions/ascites requiring monthly thoracenteses/paracenteses, VATS 1/11/18 with Pleur-X catheter (now removed), and CKD stage 4 with baseline sCr 2.6-3.0. Baseline -120s and baseline BP 90s-110s/60-70s. Upon admission he was started on IVF and diarrhea workup was initiated. On 3/12 he was noted to have decreased UOP despite fluids; NS was increased to 100cc/hr. He was scheduled for a colonoscopy on 3/13 however procedure was cancelled due to hypoxia and fever. He was transferred to the ICU for closer monitoring. He continued to have oliguria overnight. Bladder scan revealed 200cc of urine but patient refused osullivan catheter placement. Wife reports that patient always has a hard time urinating again after osullivan catheters are placed/removed so he refuses them. He remained hypoxic despite FiO2 70% and ABG revealed combined respiratory and metabolic acidosis with pH 7.17. He was started on BiPAP as well as a bicarb infusion at 50cc/hr. ABG with mild improvement. AM labs revealed K 6.2 and he was shifted and given kayexalate.Trialysis catheter was placed this morning and he subsequently developed hypotension and was started on levophed. He  was intubated later this morning. Nephrology consulted for CACHORRO. All history obtained by primary team and chart review as patient was intubated/sedated on exam. Consent for dialysis obtained by patient's wife and placed in chart. Of note, both of patient's parents were on dialysis.     Interval History: Seen in [facility]. SBP upper 80's -asymptomatic. Blood pressure improved with PRBC transfusion and midodrine. Feels an urge to void.     Review of patient's allergies indicates:   Allergen Reactions    No known drug allergies      Current Facility-Administered Medications   Medication Frequency    0.9%  NaCl infusion (for blood administration) Q24H PRN    0.9%  NaCl infusion PRN    0.9%  NaCl infusion PRN    0.9%  NaCl infusion Once    0.9%  NaCl infusion PRN    0.9%  NaCl infusion Once    acetaminophen tablet 650 mg TID    albuterol-ipratropium 2.5mg-0.5mg/3mL nebulizer solution 3 mL Q4H PRN    ALPRAZolam tablet 0.5 mg Q8H PRN    benzonatate capsule 100 mg TID PRN    cetirizine tablet 5 mg Daily    dextrose 50% injection 12.5 g PRN    dextrose 50% injection 25 g PRN    epoetin jose miguel injection 8,100 Units Every Mon, Wed, Fri    escitalopram oxalate tablet 20 mg Daily    famotidine tablet 20 mg QHS    glucagon (human recombinant) injection 1 mg PRN    glucose chewable tablet 16 g PRN    glucose chewable tablet 24 g PRN    guaiFENesin 12 hr tablet 600 mg BID    heparin (porcine) injection 1,000 Units PRN    heparin (porcine) injection 5,000 Units Q12H    insulin aspart U-100 pen 0-5 Units QID (AC + HS) PRN    insulin aspart U-100 pen 3 Units TIDWM    insulin regular (Humulin R) 100 Units in sodium chloride 0.9% 100 mL infusion Continuous    levothyroxine tablet 137 mcg Before breakfast    midodrine tablet 10 mg TID    midodrine tablet 15 mg PRN    mycophenolate capsule 250 mg BID    ondansetron disintegrating tablet 4 mg Q6H PRN    oxyCODONE immediate release tablet 5 mg Q6H PRN    polyethylene  glycol packet 17 g BID    predniSONE tablet 5 mg Daily    QUEtiapine tablet 50 mg QHS    tacrolimus capsule 5 mg BID       Objective:     Vital Signs (Most Recent):  Temp: 98.5 °F (36.9 °C) (05/04/18 1552)  Pulse: (!) 118 (05/04/18 1552)  Resp: 17 (05/04/18 1552)  BP: (!) 99/58 (05/04/18 1552)  SpO2: (!) 92 % (05/04/18 1552)  O2 Device (Oxygen Therapy): room air (05/04/18 1300) Vital Signs (24h Range):  Temp:  [98 °F (36.7 °C)-98.9 °F (37.2 °C)] 98.5 °F (36.9 °C)  Pulse:  [108-128] 118  Resp:  [16-18] 17  SpO2:  [92 %-99 %] 92 %  BP: ()/(52-73) 99/58     Weight: 75.6 kg (166 lb 10.7 oz) (05/04/18 0400)  Body mass index is 26.1 kg/m².  Body surface area is 1.89 meters squared.    I/O last 3 completed shifts:  In: 1816.8 [P.O.:660; I.V.:31.8; NG/GT:1125]  Out: 0     Physical Exam   Constitutional: He appears well-developed. No distress.   Trach   HENT:   Head: Normocephalic and atraumatic.   Eyes: Conjunctivae and EOM are normal.   Cardiovascular: Regular rhythm.  Tachycardia present.    Pulmonary/Chest: He has no wheezes. He has no rales.   L IJ TDC   Abdominal: Soft. He exhibits no distension.   LUQ PEG  Midline incision with staples intact   Neurological: He is alert.   Skin:   Right necrotic toes 1-3  Left great toe necrotic-same       Significant Labs:  All labs within the past 24 hours have been reviewed.     Significant Imaging:  Labs: Reviewed    Assessment/Plan:     Acute renal failure with acute tubular necrosis superimposed on stage 3 chronic kidney disease    - baseline sCr 2.6-3.0 consistent with CKD stage IV  - anuric CACHORRO likely ischemic ATN from prolonged pre-renal state (diarrhea) in setting of prograf renal vasoconstriction; cannot r/o septic ATN or Prograf toxicity  - SLED started 3/14. TDC placed 4/4/18.   - remains anuric  -Per [facility] Rehab, patient approve for [facility] inpatient rehab (opens May 1) when off insulin gtt. SW to assisting to outpatient HD that will take patient with  Trach.     -Continue epo  -5/4 Patient seen in [facility]. Tolerating 1.5 L UF with Albumin and midodrine PRN. Continue MWF HD while inpatient.   -Bladder scan >800 ml. Spoke with nurse in and out cath.     Anemia of CKD  -Iron 70, T sat 35, TIBC 225 and ferritan 1287  -Increase epo today  -PRBC transfusion       BMD  Lab Results   Component Value Date    PTH 23.0 04/19/2018    CALCIUM 9.2 05/04/2018    CAION 0.98 (L) 03/17/2018    PHOS 4.0 05/04/2018                 Thank you for your consult. I will follow-up with patient. Please contact us if you have any additional questions.    Amanda Cuellar NP  Nephrology  [facility]

## 2018-05-04 NOTE — SUBJECTIVE & OBJECTIVE
Interval History: Seen in [facility]. SBP upper 80's -asymptomatic. Blood pressure improved with PRBC transfusion and midodrine. Feels an urge to void.     Review of patient's allergies indicates:   Allergen Reactions    No known drug allergies      Current Facility-Administered Medications   Medication Frequency    0.9%  NaCl infusion (for blood administration) Q24H PRN    0.9%  NaCl infusion PRN    0.9%  NaCl infusion PRN    0.9%  NaCl infusion Once    0.9%  NaCl infusion PRN    0.9%  NaCl infusion Once    acetaminophen tablet 650 mg TID    albuterol-ipratropium 2.5mg-0.5mg/3mL nebulizer solution 3 mL Q4H PRN    ALPRAZolam tablet 0.5 mg Q8H PRN    benzonatate capsule 100 mg TID PRN    cetirizine tablet 5 mg Daily    dextrose 50% injection 12.5 g PRN    dextrose 50% injection 25 g PRN    epoetin jose miguel injection 8,100 Units Every Mon, Wed, Fri    escitalopram oxalate tablet 20 mg Daily    famotidine tablet 20 mg QHS    glucagon (human recombinant) injection 1 mg PRN    glucose chewable tablet 16 g PRN    glucose chewable tablet 24 g PRN    guaiFENesin 12 hr tablet 600 mg BID    heparin (porcine) injection 1,000 Units PRN    heparin (porcine) injection 5,000 Units Q12H    insulin aspart U-100 pen 0-5 Units QID (AC + HS) PRN    insulin aspart U-100 pen 3 Units TIDWM    insulin regular (Humulin R) 100 Units in sodium chloride 0.9% 100 mL infusion Continuous    levothyroxine tablet 137 mcg Before breakfast    midodrine tablet 10 mg TID    midodrine tablet 15 mg PRN    mycophenolate capsule 250 mg BID    ondansetron disintegrating tablet 4 mg Q6H PRN    oxyCODONE immediate release tablet 5 mg Q6H PRN    polyethylene glycol packet 17 g BID    predniSONE tablet 5 mg Daily    QUEtiapine tablet 50 mg QHS    tacrolimus capsule 5 mg BID       Objective:     Vital Signs (Most Recent):  Temp: 98.5 °F (36.9 °C) (05/04/18 1552)  Pulse: (!) 118 (05/04/18 1552)  Resp: 17 (05/04/18 1552)  BP: (!) 99/58  (05/04/18 1552)  SpO2: (!) 92 % (05/04/18 1552)  O2 Device (Oxygen Therapy): room air (05/04/18 1300) Vital Signs (24h Range):  Temp:  [98 °F (36.7 °C)-98.9 °F (37.2 °C)] 98.5 °F (36.9 °C)  Pulse:  [108-128] 118  Resp:  [16-18] 17  SpO2:  [92 %-99 %] 92 %  BP: ()/(52-73) 99/58     Weight: 75.6 kg (166 lb 10.7 oz) (05/04/18 0400)  Body mass index is 26.1 kg/m².  Body surface area is 1.89 meters squared.    I/O last 3 completed shifts:  In: 1816.8 [P.O.:660; I.V.:31.8; NG/GT:1125]  Out: 0     Physical Exam   Constitutional: He appears well-developed. No distress.   Trach   HENT:   Head: Normocephalic and atraumatic.   Eyes: Conjunctivae and EOM are normal.   Cardiovascular: Regular rhythm.  Tachycardia present.    Pulmonary/Chest: He has no wheezes. He has no rales.   L IJ TDC   Abdominal: Soft. He exhibits no distension.   LUQ PEG  Midline incision with staples intact   Neurological: He is alert.   Skin:   Right necrotic toes 1-3  Left great toe necrotic-same       Significant Labs:  All labs within the past 24 hours have been reviewed.     Significant Imaging:  Labs: Reviewed

## 2018-05-04 NOTE — PROGRESS NOTES
" [facility]  Adult Nutrition  Progress Note    SUMMARY       Recommendations    Recommendation/Intervention:   -Recommend reducing TF to Novasource Renal @ 30mL/hr 8pm - 6am.   -Pt is eating ~50% of meals with no ONS.   -Encourage intake. Send Novasource Renal ONS BID.  -Please document % eaten of all meals.  -RD following.     Goals: Meet % EEN, EPN  Nutrition Goal Status: goal met  Communication of RD Recs: reviewed with RN    Reason for Assessment    Reason for Assessment: RD follow-up  Diagnosis: other (see comments) (Resp. fx)  Relevant Medical History: Hashimoto's disease, HTN, HLD, DM, CHF, Heart tx (2014)  Interdisciplinary Rounds: did not attend  General Information Comments: patient doing fairly well on diet - eating ~50% of meals with no ONS, does not want to drink these - told patient if he could eat 50% of all meals and drink 2 ONS per day, he would be getting enough nutrients and we could stop tube feeds; patient is a picky eater  Nutrition Discharge Planning: Adequate intake PO vs EN    Nutrition Risk Screen    Nutrition Risk Screen: large or nonhealing wound, burn or pressure ulcer, dysphagia or difficulty swallowing, tube feeding or parenteral nutrition    Nutrition/Diet History    Patient Reported Diet/Restrictions/Preferences: other (see comments) (HAO)  Do you have any cultural, spiritual, Advent conflicts, given your current situation?: none  Factors Affecting Nutritional Intake: altered gastrointestinal function    Anthropometrics    Temp: 98 °F (36.7 °C)  Height Method: Stated  Height: 5' 7" (170.2 cm)  Height (inches): 67 in  Weight Method: Bed Scale  Weight: 75.6 kg (166 lb 10.7 oz)  Weight (lb): 166.67 lb  Ideal Body Weight (IBW), Male: 148 lb  % Ideal Body Weight, Male (lb): 111.43 lb  BMI (Calculated): 25.9  BMI Grade: 25 - 29.9 - overweight  Usual Body Weight (UBW), kg:  (HAO)       Lab/Procedures/Meds    Pertinent Labs Reviewed: reviewed  Pertinent Labs " Comments: na 129, BUN 35, Cr 3.2, GFR 22.3, Alk Phos 178  Pertinent Medications Reviewed: reviewed  Pertinent Medications Comments: albumin, epeotin, insulin, prednisone, tacrolimus    Physical Findings/Assessment    Overall Physical Appearance: other (see comments), weak, loss of muscle mass (on trach collar)  Tubes: gastrostomy tube  Oral/Mouth Cavity: WDL  Skin: incision(s)    Estimated/Assessed Needs    Weight Used For Calorie Calculations: 66.9 kg (147 lb 7.8 oz)  Energy Calorie Requirements (kcal): 1896 (1.25x PAL)  Energy Need Method: [place]-St Jeor (1.25 PAL)  Protein Requirements: 80-94g (1.2-1.4 g/kg)  Weight Used For Protein Calculations: 66.9 kg (147 lb 7.8 oz)  Fluid Requirements (mL): 80-94  Fluid Need Method: other (see comments) (Per MD or 1 mL/kcal)  RDA Method (mL): 1896  CHO Requirement: 50% total kcals      Nutrition Prescription Ordered    Current Diet Order: Regular  Current Nutrition Support Formula Ordered: Novasource Renal  Current Nutrition Support Rate Ordered: 30 (ml)  Current Nutrition Support Frequency Ordered: mL/hr 8pm-8am    Evaluation of Received Nutrient/Fluid Intake    Enteral Calories (kcal): 720  Enteral Protein (gm): 33  Enteral (Free Water) Fluid (mL): 258  Other Calories (kcal): 0  % Kcal Needs:   % Protein Needs:   IV Fluid (mL): 0  Energy Calories Required: meeting needs  Protein Required: meeting needs  Fluid Required: not meeting needs  Comments: LBM 5/3  Tolerance: tolerating  % Intake of Estimated Energy Needs: 75 - 100 %  % Meal Intake: 50 %    Nutrition Risk    Level of Risk/Frequency of Follow-up:  (FU 1x/week)     Assessment and Plan    * Acute respiratory failure with hypoxia      Nutrition Problem  Inadequate energy intake    Related to (etiology):   Inability to consume sufficient energy    Signs and Symptoms (as evidenced by):   NPO with no alternate means of nutrition     Nutrition Diagnosis Status:   Resolved                 Monitor and  Evaluation    Food and Nutrient Intake: enteral nutrition intake  Food and Nutrient Adminstration: enteral and parenteral nutrition administration  Physical Activity and Function: nutrition-related ADLs and IADLs  Anthropometric Measurements: weight, weight change  Biochemical Data, Medical Tests and Procedures: lipid profile, inflammatory profile, glucose/endocrine profile, gastrointestinal profile, electrolyte and renal panel  Nutrition-Focused Physical Findings: overall appearance     Nutrition Follow-Up    RD Follow-up?: Yes

## 2018-05-04 NOTE — PROGRESS NOTES
Dialysis complete.  Blood returned.   Left chest wall   CVC flushed * 2 with       Locked with cap and tape.  No s/s infection at cvc site.   Pt tolerated hemodialysis without diff.  Pt ran   3.5 Hrs on hemodialysis machine.   Took off  1500 Ml net volume.      Used F-160 dialyzer.

## 2018-05-04 NOTE — ASSESSMENT & PLAN NOTE
- TTE 4/24/18 showed normal graft function but has known history of restrictive CM post transplant.  - Continue Prednisone 5mg daily and  Tacrolimus 5/5 with plan to adjust as needed for goal 6-10. Resumed low dose cellcept yesterday

## 2018-05-04 NOTE — PLAN OF CARE
Reviewed insulin regimen and CBG. Patient is off the floor this AM.     Increase transition gtt to 0.6 units/hr when TF off  Increase transition gtt to 0.9 units/hr when TF on  Novolog 3 units qAC  POC AC/HS/0200  Low dose correction.     Will continue to follow along, please call with any questions.

## 2018-05-04 NOTE — PLAN OF CARE
Problem: Patient Care Overview  Goal: Plan of Care Review  Outcome: Ongoing (interventions implemented as appropriate)  AAOX4.  VSS.  Pt has nocturnal tube feeds running from 8p-8a.  100cc free water boluses given 3 times a day.  Insulin drip changed to 0.8 units while tube feeds are running.  Pt has trach currently on RA.  Speaking valve in use.  Pt requesting not to be woken up or disturbed during the night.  Pt states he has a hard time sleeping and does not want to be woken up once he falls asleep.  HD scheduled for Friday.  Family is at the bedside.  Bed is in lowest position, call bell is in reach, and pt instructed to call nurse when needing assistance.  Will continue to monitor.

## 2018-05-04 NOTE — PROGRESS NOTES
UPDATE    SW to pt's room for update. Pt presents as aaox3 with calm and pleasant affect, sitting in chair at bedside. Pt reports coping adequately with no needs at this time. SW spoke with pt's wife by phone and confirmed she is coping adequately with no needs at this time. SW providing psychosocial and counseling support, education, resources, and d/c planning as needed. SW continuing to follow and remains available.

## 2018-05-04 NOTE — PROGRESS NOTES
UPDATE    SW called [facility] and confirmed they do not have a clinic that is willing to take pt for out patient dialysis. [facility] clinic that can do trach care, but that clinic will not take a pt that needs a bed. SW notified team. SW providing psychosocial and counseling support, education, resources, and d/c planning as needed. SW continuing to follow and remains available.

## 2018-05-04 NOTE — PT/OT/SLP PROGRESS
"Occupational Therapy   Treatment    Name: Lv Crockre  MRN: [id number]  Admitting Diagnosis:  Acute respiratory failure with hypoxia  30 Days Post-Op    Recommendations:     Discharge Recommendations: rehabilitation facility  Discharge Equipment Recommendations:   (tbd)  Barriers to discharge:  Inaccessible home environment, Decreased caregiver support    Subjective   "i'm a little out of it today"  Communicated with: cyndi prior to session.  Pain/Comfort:  · Pain Rating 1: 3/10 (R shoulder with exercises)    Patients cultural, spiritual, Druze conflicts given the current situation: none stated     Objective:     Patient found with: peripheral IV, tracheostomy, PEG Tube    General Precautions: Standard, fall   Orthopedic Precautions:N/A   Braces:       Occupational Performance:    Bed Mobility:    · declined     Functional Mobility/Transfers:  · Not performed- declined  · Functional Mobility: not performed    Activities of Daily Living:  · Wiping face with mod assist, able to bring washcloth to lips and wipe mouth,  assist with LUE to bring cloth past mouth fully wipe face with washcloth    Patient left HOB elevated with call button in reach    [facility] 6 Click:  [facility] Total Score:      Treatment & Education:  Pt performed LUE exercises: AROM hand/wrist x 15 reps, elbow biceps/triceps AROM with light manual resistance x 10-12 reps, shoulder punches AAROM x 10 reps, abduction AAROM x 10 reps, elbow biceps and triceps with light manual resistance x 10 reps (bicep ROM within tolerance with PICC line), AAROM for shoulder punches within pain tolerance x 10 reps, gentle oscillations at end of tolerance range (~30') in both directions PROM. Educated on performing exercises as tolerated as much as able.   Education:    Assessment:     Lv Crocker is a 42 y.o. male with a medical diagnosis of Acute respiratory failure with hypoxia.  He presents with increased strength LUE today, increased fatigue after " dialysis.  Performance deficits affecting function are weakness, impaired endurance, impaired self care skills, impaired functional mobilty, impaired balance, gait instability, decreased upper extremity function, decreased lower extremity function, decreased ROM, pain, impaired skin, impaired cardiopulmonary response to activity.      Rehab Prognosis:  good; patient would benefit from acute skilled OT services to address these deficits and reach maximum level of function.       Plan:     Patient to be seen 5 x/week to address the above listed problems via therapeutic activities, therapeutic exercises  · Plan of Care Expires: 05/31/18  · Plan of Care Reviewed with: patient    This Plan of care has been discussed with the patient who was involved in its development and understands and is in agreement with the identified goals and treatment plan    GOALS:    Occupational Therapy Goals        Problem: Occupational Therapy Goal    Goal Priority Disciplines Outcome Interventions   Occupational Therapy Goal     OT, PT/OT Ongoing (interventions implemented as appropriate)    Description:  Goals to be met by: 5/8/18    Pt will follow 75% of motor commands with <2 verbal cues for repetition of task to maximize engagement in grooming task.--MET  Pt will complete feeding with mod A.   Pt will complete supine with HOB slightly elevated to seated at EOB with mod A in prep for seated grooming task.  Pt will engage in BUE exercises in orders to increase strength to 3+/5 in BUE's to increase engagement in seated grooming task  Pt will sit at EOB for approx 10 minutes with mod A and unilateral UE support to complete grooming task  Pt will complete sit>stand from EOB with bed in lowest position with mod A in prep for transfer to [facility]                       Time Tracking:     OT Date of Treatment: 05/04/18  OT Start Time: 1305  OT Stop Time: 1323  OT Total Time (min): 18 min    Billable Minutes:Therapeutic Exercise 18min    Delia To,  OT  5/4/2018

## 2018-05-04 NOTE — ASSESSMENT & PLAN NOTE
- baseline sCr 2.6-3.0 consistent with CKD stage IV  - anuric CACHORRO likely ischemic ATN from prolonged pre-renal state (diarrhea) in setting of prograf renal vasoconstriction; cannot r/o septic ATN or Prograf toxicity  - SLED started 3/14. TDC placed 4/4/18.   - remains anuric  -Per [facility] Med & Rehab, patient approve for [facility] inpatient rehab (opens May 1) when off insulin gtt. SW to assisting to outpatient HD that will take patient with Trach.     -Continue epo  -5/4 Patient seen in [facility]. Tolerating 1.5 L UF with Albumin and midodrine PRN. Continue MWF HD while inpatient.   -Bladder scan >800 ml. Spoke with nurse in and out cath.     Anemia of CKD  -Iron 70, T sat 35, TIBC 225 and ferritan 1287  -Increase epo today  -PRBC transfusion       BMD  Lab Results   Component Value Date    PTH 23.0 04/19/2018    CALCIUM 9.2 05/04/2018    CAION 0.98 (L) 03/17/2018    PHOS 4.0 05/04/2018

## 2018-05-04 NOTE — SUBJECTIVE & OBJECTIVE
Interval History: Pt seen after HD this am. 1 unit PRBCs given with HD    Continuous Infusions:   insulin (HUMAN R) infusion (adults) 0.8 Units/hr (05/03/18 2107)     Scheduled Meds:   sodium chloride 0.9%   Intravenous Once    sodium chloride 0.9%   Intravenous Once    acetaminophen  650 mg Oral TID    cetirizine  5 mg Oral Daily    epoetin jose miguel (PROCRIT) injection  100 Units/kg (Dosing Weight) Intravenous Every Mon, Wed, Fri    escitalopram oxalate  20 mg Oral Daily    famotidine  20 mg Oral QHS    guaiFENesin  600 mg Oral BID    heparin (porcine)  5,000 Units Subcutaneous Q12H    insulin aspart U-100  3 Units Subcutaneous TIDWM    levothyroxine  137 mcg Oral Before breakfast    midodrine  10 mg Oral TID    mycophenolate  250 mg Oral BID    polyethylene glycol  17 g Oral BID    predniSONE  5 mg Oral Daily    QUEtiapine  50 mg Oral QHS    tacrolimus  5 mg Oral BID     PRN Meds:sodium chloride, sodium chloride 0.9%, sodium chloride 0.9%, sodium chloride 0.9%, albuterol-ipratropium 2.5mg-0.5mg/3mL, ALPRAZolam, benzonatate, dextrose 50%, dextrose 50%, glucagon (human recombinant), glucose, glucose, heparin (porcine), insulin aspart U-100, midodrine, ondansetron, oxyCODONE    Review of patient's allergies indicates:   Allergen Reactions    No known drug allergies      Objective:     Vital Signs (Most Recent):  Temp: 98.5 °F (36.9 °C) (05/04/18 1552)  Pulse: (!) 118 (05/04/18 1552)  Resp: 17 (05/04/18 1552)  BP: (!) 99/58 (05/04/18 1552)  SpO2: (!) 92 % (05/04/18 1552) Vital Signs (24h Range):  Temp:  [98 °F (36.7 °C)-98.9 °F (37.2 °C)] 98.5 °F (36.9 °C)  Pulse:  [108-128] 118  Resp:  [16-18] 17  SpO2:  [92 %-99 %] 92 %  BP: ()/(52-73) 99/58     Patient Vitals for the past 72 hrs (Last 3 readings):   Weight   05/04/18 0400 75.6 kg (166 lb 10.7 oz)   05/03/18 0500 74.7 kg (164 lb 10.9 oz)   05/02/18 0600 74.6 kg (164 lb 7.4 oz)     Body mass index is 26.1 kg/m².      Intake/Output Summary (Last  24 hours) at 05/04/18 1555  Last data filed at 05/04/18 0400   Gross per 24 hour   Intake           630.99 ml   Output                0 ml   Net           630.99 ml       Hemodynamic Parameters:           Physical Exam   Constitutional: He is oriented to person, place, and time. He appears well-developed and well-nourished.   Neck: Normal range of motion. Neck supple. No JVD present.   Cardiovascular: Normal rate and regular rhythm.  Exam reveals no gallop and no friction rub.    No murmur heard.  Pulmonary/Chest: Effort normal and breath sounds normal. He has no wheezes. He has no rales.   Trach in place   Abdominal: Soft. Bowel sounds are normal. There is no tenderness.   Musculoskeletal: He exhibits no edema.   Neurological: He is alert and oriented to person, place, and time.   Skin: Skin is warm and dry.   RLE- 1st and 3rd gangrenous toes       Significant Labs:  CBC:    Recent Labs  Lab 05/02/18  0447 05/03/18  0500 05/03/18  0758 05/04/18  0500   WBC 5.82 4.97  --  4.09   RBC 2.25* 2.17*  --  2.22*   HGB 7.1* 6.7* 6.9* 6.9*   HCT 22.7* 22.7* 22.3* 23.0*    287  --  286   * 105*  --  104*   MCH 31.6* 30.9  --  31.1*   MCHC 31.3* 29.5*  --  30.0*     BNP:  No results for input(s): BNP in the last 168 hours.    Invalid input(s): BNPTRIAGELBLO  CMP:    Recent Labs  Lab 05/02/18  0446 05/03/18  0500 05/04/18  0500   * 236* 289*   CALCIUM 9.7 9.0 9.2   ALBUMIN 2.1* 2.0* 2.1*   PROT 6.3 6.3 6.2   * 135* 129*   K 4.5 3.6 3.9   CO2 24 23 25   CL 94* 98 94*   BUN 40* 22* 35*   CREATININE 3.5* 2.3* 3.2*   ALKPHOS 200* 197* 178*   ALT 13 12 13   AST 16 17 17   BILITOT 0.5 0.4 0.4      Coagulation:   No results for input(s): PT, INR, APTT in the last 168 hours.  LDH:  No results for input(s): LDH in the last 72 hours.  Microbiology:  Microbiology Results (last 7 days)     Procedure Component Value Units Date/Time    Fungus culture [[id number]] Collected:  03/28/18 1608    Order Status:  Completed  Specimen:  Body Fluid from Lung, RLL Updated:  05/01/18 1347     Fungus (Mycology) Culture No fungus isolated after 4 weeks    Culture, Respiratory with Gram Stain [[id number]] Collected:  04/26/18 1329    Order Status:  Completed Specimen:  Respiratory from Tracheal Aspirate Updated:  04/30/18 1253     Respiratory Culture Normal respiratory hank     Gram Stain (Respiratory) <10 epithelial cells per low power field.     Gram Stain (Respiratory) No WBC's     Gram Stain (Respiratory) Rare Gram negative rods          I have reviewed all pertinent labs within the past 24 hours.    Estimated Creatinine Clearance: 27.5 mL/min (A) (based on SCr of 3.2 mg/dL (H)).    Diagnostic Results:  I have reviewed all pertinent imaging results/findings within the past 24 hours.

## 2018-05-04 NOTE — PROGRESS NOTES
[facility]  Heart Transplant  Progress Note    Patient Name: Lv Crocker  MRN: [id number]  Admission Date: 3/11/2018  Hospital Length of Stay: 53 days  Attending Physician: Alexandr Hernández MD  Primary Care Provider: Philippe Mohr MD  Principal Problem:Acute respiratory failure with hypoxia    Subjective:     Interval History: Pt seen after HD this am. 1 unit PRBCs given with HD    Continuous Infusions:   insulin (HUMAN R) infusion (adults) 0.8 Units/hr (05/03/18 2107)     Scheduled Meds:   sodium chloride 0.9%   Intravenous Once    sodium chloride 0.9%   Intravenous Once    acetaminophen  650 mg Oral TID    cetirizine  5 mg Oral Daily    epoetin jose miguel (PROCRIT) injection  100 Units/kg (Dosing Weight) Intravenous Every Mon, Wed, Fri    escitalopram oxalate  20 mg Oral Daily    famotidine  20 mg Oral QHS    guaiFENesin  600 mg Oral BID    heparin (porcine)  5,000 Units Subcutaneous Q12H    insulin aspart U-100  3 Units Subcutaneous TIDWM    levothyroxine  137 mcg Oral Before breakfast    midodrine  10 mg Oral TID    mycophenolate  250 mg Oral BID    polyethylene glycol  17 g Oral BID    predniSONE  5 mg Oral Daily    QUEtiapine  50 mg Oral QHS    tacrolimus  5 mg Oral BID     PRN Meds:sodium chloride, sodium chloride 0.9%, sodium chloride 0.9%, sodium chloride 0.9%, albuterol-ipratropium 2.5mg-0.5mg/3mL, ALPRAZolam, benzonatate, dextrose 50%, dextrose 50%, glucagon (human recombinant), glucose, glucose, heparin (porcine), insulin aspart U-100, midodrine, ondansetron, oxyCODONE    Review of patient's allergies indicates:   Allergen Reactions    No known drug allergies      Objective:     Vital Signs (Most Recent):  Temp: 98.5 °F (36.9 °C) (05/04/18 1552)  Pulse: (!) 118 (05/04/18 1552)  Resp: 17 (05/04/18 1552)  BP: (!) 99/58 (05/04/18 1552)  SpO2: (!) 92 % (05/04/18 1552) Vital Signs (24h Range):  Temp:  [98 °F (36.7 °C)-98.9 °F (37.2 °C)] 98.5 °F (36.9 °C)  Pulse:   [108-128] 118  Resp:  [16-18] 17  SpO2:  [92 %-99 %] 92 %  BP: ()/(52-73) 99/58     Patient Vitals for the past 72 hrs (Last 3 readings):   Weight   05/04/18 0400 75.6 kg (166 lb 10.7 oz)   05/03/18 0500 74.7 kg (164 lb 10.9 oz)   05/02/18 0600 74.6 kg (164 lb 7.4 oz)     Body mass index is 26.1 kg/m².      Intake/Output Summary (Last 24 hours) at 05/04/18 1555  Last data filed at 05/04/18 0400   Gross per 24 hour   Intake           630.99 ml   Output                0 ml   Net           630.99 ml       Hemodynamic Parameters:           Physical Exam   Constitutional: He is oriented to person, place, and time. He appears well-developed and well-nourished.   Neck: Normal range of motion. Neck supple. No JVD present.   Cardiovascular: Normal rate and regular rhythm.  Exam reveals no gallop and no friction rub.    No murmur heard.  Pulmonary/Chest: Effort normal and breath sounds normal. He has no wheezes. He has no rales.   Trach in place   Abdominal: Soft. Bowel sounds are normal. There is no tenderness.   Musculoskeletal: He exhibits no edema.   Neurological: He is alert and oriented to person, place, and time.   Skin: Skin is warm and dry.   RLE- 1st and 3rd gangrenous toes       Significant Labs:  CBC:    Recent Labs  Lab 05/02/18  0447 05/03/18  0500 05/03/18  0758 05/04/18  0500   WBC 5.82 4.97  --  4.09   RBC 2.25* 2.17*  --  2.22*   HGB 7.1* 6.7* 6.9* 6.9*   HCT 22.7* 22.7* 22.3* 23.0*    287  --  286   * 105*  --  104*   MCH 31.6* 30.9  --  31.1*   MCHC 31.3* 29.5*  --  30.0*     BNP:  No results for input(s): BNP in the last 168 hours.    Invalid input(s): BNPTRIAGELBLO  CMP:    Recent Labs  Lab 05/02/18  0446 05/03/18  0500 05/04/18  0500   * 236* 289*   CALCIUM 9.7 9.0 9.2   ALBUMIN 2.1* 2.0* 2.1*   PROT 6.3 6.3 6.2   * 135* 129*   K 4.5 3.6 3.9   CO2 24 23 25   CL 94* 98 94*   BUN 40* 22* 35*   CREATININE 3.5* 2.3* 3.2*   ALKPHOS 200* 197* 178*   ALT 13 12 13   AST 16 17 17    BILITOT 0.5 0.4 0.4      Coagulation:   No results for input(s): PT, INR, APTT in the last 168 hours.  LDH:  No results for input(s): LDH in the last 72 hours.  Microbiology:  Microbiology Results (last 7 days)     Procedure Component Value Units Date/Time    Fungus culture [[id number]] Collected:  03/28/18 1607    Order Status:  Completed Specimen:  Body Fluid from Lung, RLL Updated:  05/01/18 1347     Fungus (Mycology) Culture No fungus isolated after 4 weeks    Culture, Respiratory with Gram Stain [[id number]] Collected:  04/26/18 1329    Order Status:  Completed Specimen:  Respiratory from Tracheal Aspirate Updated:  04/30/18 1253     Respiratory Culture Normal respiratory hank     Gram Stain (Respiratory) <10 epithelial cells per low power field.     Gram Stain (Respiratory) No WBC's     Gram Stain (Respiratory) Rare Gram negative rods          I have reviewed all pertinent labs within the past 24 hours.    Estimated Creatinine Clearance: 27.5 mL/min (A) (based on SCr of 3.2 mg/dL (H)).    Diagnostic Results:  I have reviewed all pertinent imaging results/findings within the past 24 hours.    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 Most recently has had ongoing issues with his lungs, had gotten 2 thoracenteses, after which he underwent VATS 01/19/18 followed by pleurex catheter placement and effusion drainage 01/11/18, subsequently had it removed 02/07/18 after drainage had decreased despite multiple attempts to drain it. Has been having significant coughing fits that result in him being near-syncopal at times, although difficult to say if he has passed out or not- patient most recently was admitted to the hospital for increased AGUILAR, coughing and [place] [facility] [place]   [place] was started on  antibiotics for suspected bacterial infection, with some diarrhea, bronchitis, and possible pneumonia. Ct chest showed some pleural fluid collection and after talking with Dr. James, we arranged for him to receive a diagnostic tap with IR, from which the fluid collection demonstrated no growth or significant findings at all really. Cell counts do not appear to have been sent but will recheck. Patient states since his hospital stay, yesterday had a significant coughing fit again, after which he nearly passed out, is being seen in clinic today for this. Denies any cardiopulmonary complaints, no leg swelling, has some abdominal swelling, which is moderate for him. Denies significant shortness of breath with mild exertion, had 6MWT yesterday to see if he qualified for home O2, and his O2 sats actually improved after recovery from where he started initially. He and his wife admit he is in bed most days, not very active.     Mr. Crocker presented to the ED 3/11/18 with approximately 3 weeks of worsening diarrhea and 2-3 days of sinus pressure, drainage, and worsened cough.  He notes that he had a coughing fit last night and passed out, coughed throughout most of the night.  This also happened on 2/25/18.  He last saw Dr Ferreira in clinic on 2/20/18 where he was taken off myfortic and switched to cellcept (he hadn't been on cellcept because of leukopenia when they tried post-transplant).  Though his diarrhea had improved after his last discharge, he states it has increased now to 15x/day, clear/yellow/green, no fevers, no exacerbating foods per say.  He was taken back off the cellcept and placed back on myfortic this past week and has not noticed immediate change in diarrhea. He also reports his baseline coughing fits havent improved and are minimally responsive to tessalon pearls.  Came on last night, he lost consciousness during a fit once which is relatively normal for him, and had two more during HPI.  He notes no  sick contacts but does state he's had some URI symptoms as above.  His baseline vitals are usually -120 and BP 90s/60s-110s/70s.  He reports a history of intermittent diarrhea - did have Cdiff many years ago but this smells different.  No abdominal pain, no nausea, no vomiting.  No blood in diarrhea.  Appears last c-scope in 2015 with no evidence of infection or inflammatory processes.  He does report IBS which is different that this.       * Acute respiratory failure with hypoxia    - Resolved. S/P Bronch and intubation 3/14 now post tracheostomy due to inability to extubate  - Etiology = RSV   - Pulmonology signed off. Completely weaned off vent.   - Appreciate PT/OT/Wound care.        Alteration in skin integrity    - wound care following        Restrictive cardiomyopathy    - No changes (see above)         Type 1 diabetes mellitus with stage 3 chronic kidney disease    - Appreciate Endocrine's recs  - Insulin gtt per endocrine recs. Adjust as needed  - per endocrine please call when he is off tube feeds to adjust ggt and transition to rehab/outpatient regimen        Hypothyroidism due to Hashimoto's thyroiditis    - Appreciate Endocrine's recs  - Switched to PO Levothyroxine 75mcg daily        Acute renal failure with acute tubular necrosis superimposed on stage 3 chronic kidney disease    - Appreciate Nephrology recs.   - Continue midodrine  - HD per nephrology [facility]         Anemia    - Last transfusion 5/4/18  - Nephrology has resumed ProCrit         Heart transplanted    - TTE 4/24/18 showed normal graft function but has known history of restrictive CM post transplant.  - Continue Prednisone 5mg daily and  Tacrolimus 5/5 with plan to adjust as needed for goal 6-10. Resumed low dose cellcept yesterday        Debility    - PT/OT/SLP daily. Recommending rehab.  - Nutrition following.   - Passed MBSS with speech. Started regular diet with thin liquids, but will continue tube feeds nocturnal until eating  75% of meals         Anxiety    - Increased Lexapro to 20mg daily  - Continue seroquel 50 QHS   - Continue .5mg xanax PRN Q8H   - Appreciate psychiatry input.             JACOBY WildC  Heart Transplant  [facility]

## 2018-05-05 NOTE — PROGRESS NOTES
[facility]  Heart Transplant  Progress Note    Patient Name: Lv Crocker  MRN: [id number]  Admission Date: 3/11/2018  Hospital Length of Stay: 54 days  Attending Physician: Alexandr Hernández MD  Primary Care Provider: Philippe Mohr MD  Principal Problem:Acute respiratory failure with hypoxia    Subjective:     Interval History: Fever overnight, with increasing O2 requirements. Currently feels well, no complaints    Continuous Infusions:   insulin (HUMAN R) infusion (adults) 0.5 Units/hr (05/05/18 0831)     Scheduled Meds:   sodium chloride 0.9%   Intravenous Once    sodium chloride 0.9%   Intravenous Once    acetaminophen  650 mg Oral TID    cetirizine  5 mg Oral Daily    epoetin jose miguel (PROCRIT) injection  100 Units/kg (Dosing Weight) Intravenous Every Mon, Wed, Fri    escitalopram oxalate  20 mg Oral Daily    famotidine  20 mg Oral QHS    guaiFENesin  600 mg Oral BID    heparin (porcine)  5,000 Units Subcutaneous Q12H    insulin aspart U-100  2 Units Subcutaneous TIDWM    levothyroxine  137 mcg Oral Before breakfast    midodrine  10 mg Oral TID    mycophenolate  250 mg Oral BID    polyethylene glycol  17 g Oral BID    predniSONE  5 mg Oral Daily    QUEtiapine  50 mg Oral QHS    tacrolimus  5 mg Oral BID     PRN Meds:sodium chloride, sodium chloride 0.9%, sodium chloride 0.9%, sodium chloride 0.9%, albuterol-ipratropium 2.5mg-0.5mg/3mL, ALPRAZolam, benzonatate, dextrose 50%, dextrose 50%, glucagon (human recombinant), glucose, glucose, heparin (porcine), insulin aspart U-100, midodrine, ondansetron, oxyCODONE    Review of patient's allergies indicates:   Allergen Reactions    No known drug allergies      Objective:     Vital Signs (Most Recent):  Temp: 98.1 °F (36.7 °C) (05/05/18 0800)  Pulse: 101 (05/05/18 0926)  Resp: 20 (05/05/18 0800)  BP: 101/61 (05/05/18 0800)  SpO2: (!) 93 % (05/05/18 0926) Vital Signs (24h Range):  Temp:  [98.1 °F (36.7 °C)-100.6 °F (38.1 °C)]  98.1 °F (36.7 °C)  Pulse:  [101-128] 101  Resp:  [16-22] 20  SpO2:  [87 %-97 %] 93 %  BP: ()/(48-65) 101/61     Patient Vitals for the past 72 hrs (Last 3 readings):   Weight   05/05/18 0615 76.4 kg (168 lb 6.9 oz)   05/04/18 0400 75.6 kg (166 lb 10.7 oz)   05/03/18 0500 74.7 kg (164 lb 10.9 oz)     Body mass index is 26.38 kg/m².      Intake/Output Summary (Last 24 hours) at 05/05/18 1146  Last data filed at 05/05/18 0800   Gross per 24 hour   Intake            958.5 ml   Output                0 ml   Net            958.5 ml       Hemodynamic Parameters:           Physical Exam   Constitutional: He is oriented to person, place, and time. He appears well-developed and well-nourished.   Neck: Normal range of motion. Neck supple. No JVD present.   Cardiovascular: Normal rate and regular rhythm.  Exam reveals no gallop and no friction rub.    No murmur heard.  Pulmonary/Chest: Effort normal and breath sounds normal. He has no wheezes. He has no rales.   Trach in place   Abdominal: Soft. Bowel sounds are normal. There is no tenderness.   Musculoskeletal: He exhibits no edema.   Neurological: He is alert and oriented to person, place, and time.   Skin: Skin is warm and dry.   RLE- 1st and 3rd gangrenous toes       Significant Labs:  CBC:    Recent Labs  Lab 05/03/18  0500 05/03/18  0758 05/04/18  0500 05/05/18  0600   WBC 4.97  --  4.09 5.23   RBC 2.17*  --  2.22* 2.85*   HGB 6.7* 6.9* 6.9* 8.5*   HCT 22.7* 22.3* 23.0* 27.3*     --  286 320   *  --  104* 96   MCH 30.9  --  31.1* 29.8   MCHC 29.5*  --  30.0* 31.1*     BNP:  No results for input(s): BNP in the last 168 hours.    Invalid input(s): BNPTRIAGELBLO  CMP:    Recent Labs  Lab 05/03/18  0500 05/04/18  0500 05/05/18  0600   * 289* 279*   CALCIUM 9.0 9.2 8.8   ALBUMIN 2.0* 2.1* 2.3*   PROT 6.3 6.2 6.2   * 129* 134*   K 3.6 3.9 4.4   CO2 23 25 24   CL 98 94* 99   BUN 22* 35* 22*   CREATININE 2.3* 3.2* 2.2*   ALKPHOS 197* 178* 174*    ALT 12 13 10   AST 17 17 15   BILITOT 0.4 0.4 0.5      Coagulation:   No results for input(s): PT, INR, APTT in the last 168 hours.  LDH:  No results for input(s): LDH in the last 72 hours.  Microbiology:  Microbiology Results (last 7 days)     Procedure Component Value Units Date/Time    Blood culture [[id number]] Collected:  05/05/18 0812    Order Status:  Sent Specimen:  Blood Updated:  05/05/18 0834    Narrative:       Collection has been rescheduled by CDP at 5/5/2018 07:40 Reason: Due   now  Collection has been rescheduled by CDP at 5/5/2018 07:40 Reason: Due   now    Respiratory Viral Panel by PCR [facility]; Nasal Swab [[id number]] Collected:  05/05/18 0644    Order Status:  Sent Specimen:  Respiratory Updated:  05/05/18 0802    Blood culture [[id number]] Collected:  05/05/18 0644    Order Status:  Sent Specimen:  Blood from Midline, Basilic, Right Updated:  05/05/18 0801    Blood culture [[id number]]     Order Status:  Canceled Specimen:  Blood     Culture, Respiratory with Gram Stain [[id number]]     Order Status:  No result Specimen:  Respiratory from Tracheal Aspirate     Fungus culture [[id number]] Collected:  03/28/18 1607    Order Status:  Completed Specimen:  Body Fluid from Lung, RLL Updated:  05/01/18 1347     Fungus (Mycology) Culture No fungus isolated after 4 weeks    Culture, Respiratory with Gram Stain [[id number]] Collected:  04/26/18 1329    Order Status:  Completed Specimen:  Respiratory from Tracheal Aspirate Updated:  04/30/18 1253     Respiratory Culture Normal respiratory hank     Gram Stain (Respiratory) <10 epithelial cells per low power field.     Gram Stain (Respiratory) No WBC's     Gram Stain (Respiratory) Rare Gram negative rods          I have reviewed all pertinent labs within the past 24 hours.    Estimated Creatinine Clearance: 40.1 mL/min (A) (based on SCr of 2.2 mg/dL (H)).    Diagnostic Results:  I have reviewed all pertinent imaging results/findings within the past 24  hours.    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 Most recently has had ongoing issues with his lungs, had gotten 2 thoracenteses, after which he underwent VATS 01/19/18 followed by pleurex catheter placement and effusion drainage 01/11/18, subsequently had it removed 02/07/18 after drainage had decreased despite multiple attempts to drain it. Has been having significant coughing fits that result in him being near-syncopal at times, although difficult to say if he has passed out or not- patient most recently was admitted to the hospital for increased AGUILAR, coughing and pre-syncope 02[place]11-02/14/18 [place] [facility]   Patient was started on antibiotics for suspected bacterial infection, with some diarrhea, bronchitis, and possible pneumonia. Ct chest showed some pleural fluid collection and after talking with Dr. James, we arranged for him to receive a diagnostic tap with IR, from which the fluid collection demonstrated no growth or significant findings at all really. Cell counts do not appear to have been sent but will recheck. Patient states since his hospital stay, yesterday had a significant coughing fit again, after which he nearly passed out, is being seen in clinic today for this. Denies any cardiopulmonary complaints, no leg swelling, has some abdominal swelling, which is moderate for him. Denies significant shortness of breath with mild exertion, had 6MWT yesterday to see if he qualified for home O2, and his O2 sats actually improved after recovery from where he started initially. He and his wife admit he is in bed most days, not very active.     Mr. Crocker presented to the ED 3/11/18 with approximately 3 weeks of worsening diarrhea and 2-3 days of sinus pressure, drainage, and worsened cough.  He notes that he had a  coughing fit last night and passed out, coughed throughout most of the night.  This also happened on 2/25/18.  He last saw Dr Ferreira in clinic on 2/20/18 where he was taken off myfortic and switched to cellcept (he hadn't been on cellcept because of leukopenia when they tried post-transplant).  Though his diarrhea had improved after his last discharge, he states it has increased now to 15x/day, clear/yellow/green, no fevers, no exacerbating foods per say.  He was taken back off the cellcept and placed back on myfortic this past week and has not noticed immediate change in diarrhea. He also reports his baseline coughing fits havent improved and are minimally responsive to tessalon pearls.  Came on last night, he lost consciousness during a fit once which is relatively normal for him, and had two more during HPI.  He notes no sick contacts but does state he's had some URI symptoms as above.  His baseline vitals are usually -120 and BP 90s/60s-110s/70s.  He reports a history of intermittent diarrhea - did have Cdiff many years ago but this smells different.  No abdominal pain, no nausea, no vomiting.  No blood in diarrhea.  Appears last c-scope in 2015 with no evidence of infection or inflammatory processes.  He does report IBS which is different that this.       * Acute respiratory failure with hypoxia    - Resolved. S/P Bronch and intubation 3/14 now post tracheostomy due to inability to extubate  - Etiology = RSV   - Pulmonology signed off. Completely weaned off vent.   - Appreciate PT/OT/Wound care.        Fever    -CXR- no new or acute process  -Trach aspirate Cx, Resp Viral panel, Bl Cultures  -Will start Abx if he has another fever         Heart transplanted    - TTE 4/24/18 showed normal graft function but has known history of restrictive CM post transplant.  - Continue Prednisone 5mg daily and  Tacrolimus 5/5 with plan to adjust as needed for goal 6-10. Resumed low dose cellcept yesterday         Alteration in skin integrity    - wound care following        Restrictive cardiomyopathy    - No changes (see above)         Type 1 diabetes mellitus with stage 3 chronic kidney disease    - Appreciate Endocrine's recs  - Insulin gtt per endocrine recs. Adjust as needed  - per endocrine please call when he is off tube feeds to adjust ggt and transition to rehab/outpatient regimen        Hypothyroidism due to Hashimoto's thyroiditis    - Appreciate Endocrine's recs  - Switched to PO Levothyroxine 75mcg daily        Acute renal failure with acute tubular necrosis superimposed on stage 3 chronic kidney disease    - Appreciate Nephrology recs.   - Continue midodrine  - HD per nephrology SAVANAN ZARATE         Anemia    - Last transfusion 5/4/18  - Nephrology has resumed ProCrit         Debility    - PT/OT/SLP daily. Recommending rehab.  - Nutrition following.   - Passed MBSS with speech. Started regular diet with thin liquids, but will continue tube feeds nocturnal until eating 75% of meals         Anxiety    - Increased Lexapro to 20mg daily  - Continue seroquel 50 QHS   - Continue .5mg xanax PRN Q8H   - Appreciate psychiatry input.             Corinne Presley PA-C  Heart Transplant  [facility]

## 2018-05-05 NOTE — PROGRESS NOTES
[facility]  Endocrinology  Progress Note    Admit Date: 3/11/2018     Reason for Consult: abnormal TFTs    Surgical Procedure and Date: s/p heart transplant 2014     Diabetes diagnosis year: 5yo (T1DM)     Home Diabetes Medications:    Levemir 15u qHS  Novolog 4u AC     How often checking glucose at home? 4 times daily   BG readings on regimen: 150-200s  Hypoglycemia on the regimen?  Yes, rarely - treats appropriately (light snack, glucose tab)  Missed doses on regimen?  No        Diabetes Complications include:     Hyperglycemia, Hypoglycemia  and Diabetic chronic kidney disease          Complicating diabetes co morbidities:   N/A     HPI:   Patient is a 42 y.o. male with a diagnosis of T1DM, HTN, hypothyroidism, s/p heart transplant.  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 He required serial paracentesis and thoracentesis until he underwent a VATS with pleurX catheter placement on 1/11/2018 and removed 2/7/18.       Presented to hospital secondary to diarrhea and cough.  Upon initial labs, TSH was decreased (0.101) and free T4 1.33.  Endocrinology consulted to assist in management of these labs.  He was last seen in clinic by Kamala Vázquez NP 11/2017.   Previous hospitalization, endocrine consulted for same problem.  During that visit, recommended decreasing LT4 to 125mcg daily. Unfortunately, patient was discharged on previous dose of 150mcg daily.     Interval HPI:   Overnight events: Febrile overnight. Decrease in O2 saturation - infectious workup initiated by primary team. BG below goal when TF off, but above goal when TF on at night.  Eating:   ~60%  Nausea: No  Hypoglycemia and intervention: No  Fever: Yes  TPN and/or TF: Yes  If yes, type of TF/TPN and rate: Novasource renal @ 30/hr - 8p-8a    BP (!) 98/54 (BP Location: Left arm, Patient Position: Lying)   Pulse (!) 115    "Temp 98.6 °F (37 °C) (Oral)   Resp 20   Ht 5' 7" (1.702 m)   Wt 76.4 kg (168 lb 6.9 oz)   SpO2 (!) 90%   BMI 26.38 kg/m²       Labs Reviewed and Include      Recent Labs  Lab 05/05/18  0600   *   CALCIUM 8.8   ALBUMIN 2.3*   PROT 6.2   *   K 4.4   CO2 24   CL 99   BUN 22*   CREATININE 2.2*   ALKPHOS 174*   ALT 10   AST 15   BILITOT 0.5     Lab Results   Component Value Date    WBC 5.23 05/05/2018    HGB 8.5 (L) 05/05/2018    HCT 27.3 (L) 05/05/2018    MCV 96 05/05/2018     05/05/2018       Recent Labs  Lab 05/05/18  0600   TSH 4.793*   FREET4 0.77     Lab Results   Component Value Date    HGBA1C 5.1 04/05/2018       Nutritional status:   Body mass index is 26.38 kg/m².  Lab Results   Component Value Date    ALBUMIN 2.3 (L) 05/05/2018    ALBUMIN 2.1 (L) 05/04/2018    ALBUMIN 2.0 (L) 05/03/2018     Lab Results   Component Value Date    PREALBUMIN 13 (L) 04/08/2018    PREALBUMIN 5 (L) 03/15/2018    PREALBUMIN 18 (L) 03/24/2015       Estimated Creatinine Clearance: 40.1 mL/min (A) (based on SCr of 2.2 mg/dL (H)).    Accu-Checks  Recent Labs      05/03/18   1425  05/03/18   1828  05/03/18   2101  05/04/18   0915  05/04/18   1229  05/04/18   1420  05/04/18   1659  05/04/18   2114  05/05/18   0124  05/05/18   0826   POCTGLUCOSE  242*  229*  219*  226*  95  104  112*  154*  245*  321*       Current Medications and/or Treatments Impacting Glycemic Control  Immunotherapy:  Immunosuppressants         Stop Route Frequency     tacrolimus capsule 5 mg      -- Oral 2 times daily     mycophenolate capsule 250 mg      -- Oral 2 times daily        Steroids:   Hormones     Start     Stop Route Frequency Ordered    05/01/18 0900  predniSONE tablet 5 mg      -- Oral Daily 04/30/18 1513        Pressors:    Autonomic Drugs     Start     Stop Route Frequency Ordered    04/30/18 0705  midodrine tablet 15 mg      -- Oral As needed (PRN) 04/30/18 0707 04/25/18 0900  midodrine tablet 10 mg      -- Oral 3 times daily " 04/25/18 0650        Hyperglycemia/Diabetes Medications: Antihyperglycemics     Start     Stop Route Frequency Ordered    05/05/18 1130  insulin aspart U-100 pen 2 Units      -- SubQ 3 times daily with meals 05/05/18 0831    04/26/18 2000  insulin aspart U-100 pen 0-5 Units      -- SubQ Before meals & nightly PRN 04/26/18 1904    04/11/18 0015  insulin regular (Humulin R) 100 Units in sodium chloride 0.9% 100 mL infusion     Question:  Insulin Rate Adjustment (DO NOT MODIFY ANSWER)  Answer:  [email or web address]    -- IV Continuous 04/10/18 2313          ASSESSMENT and PLAN    * Acute respiratory failure with hypoxia    Per primary        On enteral nutrition    TF at goal May increase insulin needs          Current chronic use of systemic steroids    Can increase insulin requirement        CKD (chronic kidney disease), stage IV    Titrate cautiously.  Caution with insulin stacking.  Avoid hypoglycemia.        Type 1 diabetes mellitus with stage 3 chronic kidney disease    BG goal 140-180,       Will need change in infusion rates when TF on:  1.1 units/hr when tube feeds on (8p-8a)  0.5 units/hr when tube feeds off (8a-8p)  Decrease novolog 2 units with meals  Low dose correction  POC glucose ac/hs/0200    Notify endocrine for changes in nutrition status (diet, TF, TPN)    Pt is T1DM, do not suspend insulin >1 hr   If TF held, decrease insulin rate to 0.5u/hr     Discharge Recommendations:  Will need scheduled novolog q4 or regular q6 for TF and will need basal insulin for T1DM         Hypothyroidism due to Hashimoto's thyroiditis    Continue LT4 137 mcg/day - give when TF off.  TSH improving.        Acute renal failure with acute tubular necrosis superimposed on stage 3 chronic kidney disease    Estimated Creatinine Clearance: 40.1 mL/min (A) (based on SCr of 2.2 mg/dL (H)).  Avoid insulin stacking          Heart transplanted    Per transplant  Avoid  hypoglycemia  On PDN and prograf - could lead to prandial elevations and insulin resistance.            Corey Johnson MD  Endocrinology  [facility]

## 2018-05-05 NOTE — ASSESSMENT & PLAN NOTE
- TTE 4/24/18 showed normal graft function but has known history of restrictive CM post transplant.  - Continue Prednisone 5mg daily and  Tacrolimus 5/5 with plan to adjust as needed for goal 6-10. Resumed low dose cellcept

## 2018-05-05 NOTE — SUBJECTIVE & OBJECTIVE
"Interval HPI:   Overnight events: Febrile overnight. Decrease in O2 saturation - infectious workup initiated by primary team. BG below goal when TF off, but above goal when TF on at night.  Eating:   ~60%  Nausea: No  Hypoglycemia and intervention: No  Fever: Yes  TPN and/or TF: Yes  If yes, type of TF/TPN and rate: Novasource renal @ 30/hr - 8p-8a    BP (!) 98/54 (BP Location: Left arm, Patient Position: Lying)   Pulse (!) 115   Temp 98.6 °F (37 °C) (Oral)   Resp 20   Ht 5' 7" (1.702 m)   Wt 76.4 kg (168 lb 6.9 oz)   SpO2 (!) 90%   BMI 26.38 kg/m²     Labs Reviewed and Include      Recent Labs  Lab 05/05/18  0600   *   CALCIUM 8.8   ALBUMIN 2.3*   PROT 6.2   *   K 4.4   CO2 24   CL 99   BUN 22*   CREATININE 2.2*   ALKPHOS 174*   ALT 10   AST 15   BILITOT 0.5     Lab Results   Component Value Date    WBC 5.23 05/05/2018    HGB 8.5 (L) 05/05/2018    HCT 27.3 (L) 05/05/2018    MCV 96 05/05/2018     05/05/2018       Recent Labs  Lab 05/05/18  0600   TSH 4.793*   FREET4 0.77     Lab Results   Component Value Date    HGBA1C 5.1 04/05/2018       Nutritional status:   Body mass index is 26.38 kg/m².  Lab Results   Component Value Date    ALBUMIN 2.3 (L) 05/05/2018    ALBUMIN 2.1 (L) 05/04/2018    ALBUMIN 2.0 (L) 05/03/2018     Lab Results   Component Value Date    PREALBUMIN 13 (L) 04/08/2018    PREALBUMIN 5 (L) 03/15/2018    PREALBUMIN 18 (L) 03/24/2015       Estimated Creatinine Clearance: 40.1 mL/min (A) (based on SCr of 2.2 mg/dL (H)).    Accu-Checks  Recent Labs      05/03/18   1425  05/03/18   1828  05/03/18   2101  05/04/18   0915  05/04/18   1229  05/04/18   1420  05/04/18   1659  05/04/18   2114  05/05/18   0124  05/05/18   0826   POCTGLUCOSE  242*  229*  219*  226*  95  104  112*  154*  245*  321*       Current Medications and/or Treatments Impacting Glycemic Control  Immunotherapy:  Immunosuppressants         Stop Route Frequency     tacrolimus capsule 5 mg      -- Oral 2 times daily "     mycophenolate capsule 250 mg      -- Oral 2 times daily        Steroids:   Hormones     Start     Stop Route Frequency Ordered    05/01/18 0900  predniSONE tablet 5 mg      -- Oral Daily 04/30/18 1513        Pressors:    Autonomic Drugs     Start     Stop Route Frequency Ordered    04/30/18 0705  midodrine tablet 15 mg      -- Oral As needed (PRN) 04/30/18 0707    04/25/18 0900  midodrine tablet 10 mg      -- Oral 3 times daily 04/25/18 0650        Hyperglycemia/Diabetes Medications: Antihyperglycemics     Start     Stop Route Frequency Ordered    05/05/18 1130  insulin aspart U-100 pen 2 Units      -- SubQ 3 times daily with meals 05/05/18 0831    04/26/18 2000  insulin aspart U-100 pen 0-5 Units      -- SubQ Before meals & nightly PRN 04/26/18 1904    04/11/18 0015  insulin regular (Humulin R) 100 Units in sodium chloride 0.9% 100 mL infusion     Question:  Insulin Rate Adjustment (DO NOT MODIFY ANSWER)  Answer:  [email or web address] [id number]    -- IV Continuous 04/10/18 2921

## 2018-05-05 NOTE — ASSESSMENT & PLAN NOTE
BG goal 140-180,       Will need change in infusion rates when TF on:  1.1 units/hr when tube feeds on (8p-8a)  0.5 units/hr when tube feeds off (8a-8p)  Decrease novolog 2 units with meals  Low dose correction  POC glucose /hs/0200    Notify endocrine for changes in nutrition status (diet, TF, TPN)    Pt is T1DM, do not suspend insulin >1 hr   If TF held, decrease insulin rate to 0.5u/hr     Discharge Recommendations:  Will need scheduled novolog q4 or regular q6 for TF and will need basal insulin for T1DM

## 2018-05-05 NOTE — ASSESSMENT & PLAN NOTE
Estimated Creatinine Clearance: 40.1 mL/min (A) (based on SCr of 2.2 mg/dL (H)).  Avoid insulin stacking

## 2018-05-05 NOTE — SIGNIFICANT EVENT
Attempted to notify MD on call for heart transplant X3 of patient's low grade temperature (100.6 F) with increased oxygen requirements (02 saturation= 87% on room air; placed on 5L/28% TC= 02 sats= 94%). Patient also anxious, despite Xanax 0.5 mg PO.  verbalized that they would call his cell and have him call me back when available.

## 2018-05-05 NOTE — PROGRESS NOTES
Called by primary team regarding removal of staples placed for open gastrostomy tube placement on 4/4/18. Upon arrival, patient was resting comfortably in bed in no acute distress. Midline incision healing well, clean, dry and intact without surrounding erythema or tenderness. Staples removed at bedside. No further intervention needed, please call with questions.    Farida Syed, PGY-1  General Surgery  [phone]

## 2018-05-05 NOTE — SUBJECTIVE & OBJECTIVE
Interval History: Fever overnight, with increasing O2 requirements. Currently feels well, no complaints    Continuous Infusions:   insulin (HUMAN R) infusion (adults) 0.5 Units/hr (05/05/18 0831)     Scheduled Meds:   sodium chloride 0.9%   Intravenous Once    sodium chloride 0.9%   Intravenous Once    acetaminophen  650 mg Oral TID    cetirizine  5 mg Oral Daily    epoetin jose miguel (PROCRIT) injection  100 Units/kg (Dosing Weight) Intravenous Every Mon, Wed, Fri    escitalopram oxalate  20 mg Oral Daily    famotidine  20 mg Oral QHS    guaiFENesin  600 mg Oral BID    heparin (porcine)  5,000 Units Subcutaneous Q12H    insulin aspart U-100  2 Units Subcutaneous TIDWM    levothyroxine  137 mcg Oral Before breakfast    midodrine  10 mg Oral TID    mycophenolate  250 mg Oral BID    polyethylene glycol  17 g Oral BID    predniSONE  5 mg Oral Daily    QUEtiapine  50 mg Oral QHS    tacrolimus  5 mg Oral BID     PRN Meds:sodium chloride, sodium chloride 0.9%, sodium chloride 0.9%, sodium chloride 0.9%, albuterol-ipratropium 2.5mg-0.5mg/3mL, ALPRAZolam, benzonatate, dextrose 50%, dextrose 50%, glucagon (human recombinant), glucose, glucose, heparin (porcine), insulin aspart U-100, midodrine, ondansetron, oxyCODONE    Review of patient's allergies indicates:   Allergen Reactions    No known drug allergies      Objective:     Vital Signs (Most Recent):  Temp: 98.1 °F (36.7 °C) (05/05/18 0800)  Pulse: 101 (05/05/18 0926)  Resp: 20 (05/05/18 0800)  BP: 101/61 (05/05/18 0800)  SpO2: (!) 93 % (05/05/18 0926) Vital Signs (24h Range):  Temp:  [98.1 °F (36.7 °C)-100.6 °F (38.1 °C)] 98.1 °F (36.7 °C)  Pulse:  [101-128] 101  Resp:  [16-22] 20  SpO2:  [87 %-97 %] 93 %  BP: ()/(48-65) 101/61     Patient Vitals for the past 72 hrs (Last 3 readings):   Weight   05/05/18 0615 76.4 kg (168 lb 6.9 oz)   05/04/18 0400 75.6 kg (166 lb 10.7 oz)   05/03/18 0500 74.7 kg (164 lb 10.9 oz)     Body mass index is 26.38  kg/m².      Intake/Output Summary (Last 24 hours) at 05/05/18 1146  Last data filed at 05/05/18 0800   Gross per 24 hour   Intake            958.5 ml   Output                0 ml   Net            958.5 ml       Hemodynamic Parameters:           Physical Exam   Constitutional: He is oriented to person, place, and time. He appears well-developed and well-nourished.   Neck: Normal range of motion. Neck supple. No JVD present.   Cardiovascular: Normal rate and regular rhythm.  Exam reveals no gallop and no friction rub.    No murmur heard.  Pulmonary/Chest: Effort normal and breath sounds normal. He has no wheezes. He has no rales.   Trach in place   Abdominal: Soft. Bowel sounds are normal. There is no tenderness.   Musculoskeletal: He exhibits no edema.   Neurological: He is alert and oriented to person, place, and time.   Skin: Skin is warm and dry.   RLE- 1st and 3rd gangrenous toes       Significant Labs:  CBC:    Recent Labs  Lab 05/03/18  0500 05/03/18  0758 05/04/18  0500 05/05/18  0600   WBC 4.97  --  4.09 5.23   RBC 2.17*  --  2.22* 2.85*   HGB 6.7* 6.9* 6.9* 8.5*   HCT 22.7* 22.3* 23.0* 27.3*     --  286 320   *  --  104* 96   MCH 30.9  --  31.1* 29.8   MCHC 29.5*  --  30.0* 31.1*     BNP:  No results for input(s): BNP in the last 168 hours.    Invalid input(s): BNPTRIAGELBLO  CMP:    Recent Labs  Lab 05/03/18  0500 05/04/18  0500 05/05/18  0600   * 289* 279*   CALCIUM 9.0 9.2 8.8   ALBUMIN 2.0* 2.1* 2.3*   PROT 6.3 6.2 6.2   * 129* 134*   K 3.6 3.9 4.4   CO2 23 25 24   CL 98 94* 99   BUN 22* 35* 22*   CREATININE 2.3* 3.2* 2.2*   ALKPHOS 197* 178* 174*   ALT 12 13 10   AST 17 17 15   BILITOT 0.4 0.4 0.5      Coagulation:   No results for input(s): PT, INR, APTT in the last 168 hours.  LDH:  No results for input(s): LDH in the last 72 hours.  Microbiology:  Microbiology Results (last 7 days)     Procedure Component Value Units Date/Time    Blood culture [[id number]] Collected:   05/05/18 0812    Order Status:  Sent Specimen:  Blood Updated:  05/05/18 0834    Narrative:       Collection has been rescheduled by CDP at 5/5/2018 07:40 Reason: Due   now  Collection has been rescheduled by CDP at 5/5/2018 07:40 Reason: Due   now    Respiratory Viral Panel by PCR [facility]; Nasal Swab [[id number]] Collected:  05/05/18 0644    Order Status:  Sent Specimen:  Respiratory Updated:  05/05/18 0802    Blood culture [[id number]] Collected:  05/05/18 0644    Order Status:  Sent Specimen:  Blood from Midline, Basilic, Right Updated:  05/05/18 0801    Blood culture [[id number]]     Order Status:  Canceled Specimen:  Blood     Culture, Respiratory with Gram Stain [[id number]]     Order Status:  No result Specimen:  Respiratory from Tracheal Aspirate     Fungus culture [[id number]] Collected:  03/28/18 1607    Order Status:  Completed Specimen:  Body Fluid from Lung, RLL Updated:  05/01/18 1347     Fungus (Mycology) Culture No fungus isolated after 4 weeks    Culture, Respiratory with Gram Stain [[id number]] Collected:  04/26/18 1329    Order Status:  Completed Specimen:  Respiratory from Tracheal Aspirate Updated:  04/30/18 1253     Respiratory Culture Normal respiratory hank     Gram Stain (Respiratory) <10 epithelial cells per low power field.     Gram Stain (Respiratory) No WBC's     Gram Stain (Respiratory) Rare Gram negative rods          I have reviewed all pertinent labs within the past 24 hours.    Estimated Creatinine Clearance: 40.1 mL/min (A) (based on SCr of 2.2 mg/dL (H)).    Diagnostic Results:  I have reviewed all pertinent imaging results/findings within the past 24 hours.

## 2018-05-06 NOTE — PROGRESS NOTES
[facility]  Endocrinology  Progress Note    Admit Date: 3/11/2018     Reason for Consult: abnormal TFTs    Surgical Procedure and Date: s/p heart transplant      Diabetes diagnosis year: 5yo (T1DM)     Home Diabetes Medications:    Levemir 15u qHS  Novolog 4u AC     How often checking glucose at home? 4 times daily   BG readings on regimen: 150-200s  Hypoglycemia on the regimen?  Yes, rarely - treats appropriately (light snack, glucose tab)  Missed doses on regimen?  No        Diabetes Complications include:     Hyperglycemia, Hypoglycemia  and Diabetic chronic kidney disease          Complicating diabetes co morbidities:   N/A     HPI:   Patient is a 42 y.o. male with a diagnosis of T1DM, HTN, hypothyroidism, s/p heart transplant.  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 He required serial paracentesis and thoracentesis until he underwent a VATS with pleurX catheter placement on 2018 and removed 18.       Presented to hospital secondary to diarrhea and cough.  Upon initial labs, TSH was decreased (0.101) and free T4 1.33.  Endocrinology consulted to assist in management of these labs.  He was last seen in clinic by Kamala Vázquez NP 2017.   Previous hospitalization, endocrine consulted for same problem.  During that visit, recommended decreasing LT4 to 125mcg daily. Unfortunately, patient was discharged on previous dose of 150mcg daily.     Interval HPI:   Overnight events: BG elevated to 300-400 this AM after feeds. Reports eating >75% of meals and wants to know if he really needs to continue on feeds.  Eatin%  Nausea: No  Hypoglycemia and intervention: No  Fever: No  TPN and/or TF: Yes  If yes, type of TF/TPN and rate: Novasource renal @ 30/hr - 8p-8a    /71 (BP Location: Left arm, Patient Position: Lying)   Pulse (!) 125   Temp 97.4 °F (36.3 °C) (Oral)   " Resp 18   Ht 5' 7" (1.702 m)   Wt 76.4 kg (168 lb 6.9 oz)   SpO2 (!) 94%   BMI 26.38 kg/m²       Labs Reviewed and Include      Recent Labs  Lab 05/06/18  0600   *   CALCIUM 9.0   ALBUMIN 2.2*   PROT 6.4   *   K 5.0   CO2 22*   CL 94*   BUN 40*   CREATININE 3.0*   ALKPHOS 166*   ALT 9*   AST 21   BILITOT 0.4     Lab Results   Component Value Date    WBC 5.86 05/06/2018    HGB 8.8 (L) 05/06/2018    HCT 28.3 (L) 05/06/2018    MCV 96 05/06/2018     (H) 05/06/2018       Recent Labs  Lab 05/05/18  0600   TSH 4.793*   FREET4 0.77     Lab Results   Component Value Date    HGBA1C 5.1 04/05/2018       Nutritional status:   Body mass index is 26.38 kg/m².  Lab Results   Component Value Date    ALBUMIN 2.2 (L) 05/06/2018    ALBUMIN 2.3 (L) 05/05/2018    ALBUMIN 2.1 (L) 05/04/2018     Lab Results   Component Value Date    PREALBUMIN 13 (L) 04/08/2018    PREALBUMIN 5 (L) 03/15/2018    PREALBUMIN 18 (L) 03/24/2015       Estimated Creatinine Clearance: 29.4 mL/min (A) (based on SCr of 3 mg/dL (H)).    Accu-Checks  Recent Labs      05/04/18   1659  05/04/18   2114  05/05/18   0124  05/05/18   0826  05/05/18   1209  05/05/18   1723  05/05/18   2222  05/06/18   0204  05/06/18   0758  05/06/18   1243   POCTGLUCOSE  112*  154*  245*  321*  321*  176*  217*  293*  418*  346*       Current Medications and/or Treatments Impacting Glycemic Control  Immunotherapy:  Immunosuppressants         Stop Route Frequency     tacrolimus capsule 5 mg      -- Oral 2 times daily     mycophenolate capsule 250 mg      -- Oral 2 times daily        Steroids:   Hormones     Start     Stop Route Frequency Ordered    05/01/18 0900  predniSONE tablet 5 mg      -- Oral Daily 04/30/18 1513        Pressors:    Autonomic Drugs     Start     Stop Route Frequency Ordered    04/30/18 0705  midodrine tablet 15 mg      -- Oral As needed (PRN) 04/30/18 0707    04/25/18 0900  midodrine tablet 10 mg      -- Oral 3 times daily 04/25/18 0650    "     Hyperglycemia/Diabetes Medications: Antihyperglycemics     Start     Stop Route Frequency Ordered    05/06/18 0815  insulin aspart U-100 pen 3 Units      -- SubQ 3 times daily with meals 05/06/18 0803    04/26/18 2000  insulin aspart U-100 pen 0-5 Units      -- SubQ Before meals & nightly PRN 04/26/18 1904    04/11/18 0015  insulin regular (Humulin R) 100 Units in sodium chloride 0.9% 100 mL infusion     Question:  Insulin Rate Adjustment (DO NOT MODIFY ANSWER)  Answer:  [email or web address]    -- IV Continuous 04/10/18 2313          ASSESSMENT and PLAN    * Acute respiratory failure with hypoxia    Per primary        On enteral nutrition    Consider reconsult to nutrition to see if he still needs nocturnal feeds. It seems he's eating well enough to maintain nutrition.    If he still needs the nocturnal feeds, recommend changing back to glucerna, which will make his blood glucose less labile.        Current chronic use of systemic steroids    Can increase insulin requirement        CKD (chronic kidney disease), stage IV    Titrate cautiously.  Caution with insulin stacking.  Avoid hypoglycemia.        Type 1 diabetes mellitus with stage 3 chronic kidney disease    BG goal 140-180,       Will need change in infusion rates when TF on:  1.1 > 1.3 units/hr when tube feeds on (8p-8a)  0.5 > 0.6 units/hr when tube feeds off (8a-8p)  Increase novolog 3 units with meals  Low dose correction  POC glucose ac/hs/0200    Paged primary team regarding need for ongoing feeds (see above). Awaiting call back to discuss.    Notify endocrine for changes in nutrition status (diet, TF, TPN)    Pt is T1DM, do not suspend insulin >1 hr   If TF held, decrease insulin rate to 0.5u/hr     Discharge Recommendations:  TBD        Hypothyroidism due to Hashimoto's thyroiditis    Continue LT4 137 mcg/day - give when TF off.  TSH improving.        Acute renal failure with acute tubular  necrosis superimposed on stage 3 chronic kidney disease    Estimated Creatinine Clearance: 29.4 mL/min (A) (based on SCr of 3 mg/dL (H)).  Avoid insulin stacking          Heart transplanted    Per transplant  Avoid hypoglycemia  On PDN and prograf - could lead to prandial elevations and insulin resistance.            Corey Johnson MD  Endocrinology  [facility]

## 2018-05-06 NOTE — ASSESSMENT & PLAN NOTE
Continue LT4 137 mcg/day - give when TF off.  TSH improving.   rash     Outpatient Prescriptions Marked as Taking for the 1/4/18 encounter (Office Visit) with Dayneon Netta, DO   Medication Sig Dispense Refill    triamcinolone (KENALOG) 0.1 % ointment Apply to affected area twice daily for up to 2 weeks or until improved. 80 g 2    ketoconazole (NIZORAL) 2 % cream Apply topically daily. 30 g 1    ciprofloxacin (CILOXAN) 0.3 % ophthalmic solution Use 4 drops in the right ear three times daily for four days. 1 Bottle 0    ramipril (ALTACE) 5 MG capsule TAKE 1 CAPSULE BY MOUTH  EVERY MORNING 90 capsule 2    ULTICARE MINI PEN NEEDLES 31G X 6 MM MISC USE WITH INSULIN  ADMINISTRATION ONCE DAILY 100 each 2    metoprolol succinate (TOPROL XL) 50 MG extended release tablet TAKE 1 TABLET BY MOUTH TWO  TIMES DAILY 180 tablet 2    LANTUS SOLOSTAR 100 UNIT/ML injection pen INJECT 10 UNITS INTO THE  SKIN NIGHTLY 15 mL 2    glipiZIDE (GLUCOTROL) 5 MG tablet Take 1 tablet by mouth  daily 90 tablet 2    atorvastatin (LIPITOR) 20 MG tablet Take 1 tablet by mouth  daily 90 tablet 2    ACCU-CHEK BRITNEY PLUS strip Test two times daily for  diabetes 200 strip 2    ACCU-CHEK FASTCLIX LANCETS MISC Test two times daily for  diabetes 204 each 2    ramipril (ALTACE) 10 MG capsule Take 1 capsule by mouth  every evening 90 capsule 3    SITagliptin (JANUVIA) 100 MG tablet Take 1 tablet by mouth daily 90 tablet 3    Lancets Misc. (ACCU-CHEK FASTCLIX LANCET) KIT Test twice daily for diabetes e11.9 1 kit 5    Insulin Syringe-Needle U-100 ([employer] INSULIN SYRINGE) 31G X 5/16\" 0.5 ML MISC 1 each by Does not apply route daily Use daily for insulin adminstration 100 each 3    Blood Glucose Monitoring Suppl (ACCU-CHEK BRITNEY PLUS) W/DEVICE KIT 1 each by Does not apply route 2 times daily. TEST TWICE DAILY FOR DIABETES 1 kit 0    aspirin EC 81 MG EC tablet Take 1 tablet by mouth daily.          Social History:   Social History     Social History    Marital status:      Spouse name: N/A

## 2018-05-06 NOTE — ASSESSMENT & PLAN NOTE
BG goal 140-180,       Will need change in infusion rates when TF on:  1.1 > 1.3 units/hr when tube feeds on (8p-8a)  0.5 > 0.6 units/hr when tube feeds off (8a-8p)  Increase novolog 3 units with meals  Low dose correction  POC glucose ac/hs/0200    Paged primary team regarding need for ongoing feeds (see above). Awaiting call back to discuss.    Notify endocrine for changes in nutrition status (diet, TF, TPN)    Pt is T1DM, do not suspend insulin >1 hr   If TF held, decrease insulin rate to 0.5u/hr     Discharge Recommendations:  TBD

## 2018-05-06 NOTE — ASSESSMENT & PLAN NOTE
Estimated Creatinine Clearance: 29.4 mL/min (A) (based on SCr of 3 mg/dL (H)).  Avoid insulin stacking

## 2018-05-06 NOTE — ASSESSMENT & PLAN NOTE
Consider reconsult to nutrition to see if he still needs nocturnal feeds. It seems he's eating well enough to maintain nutrition.    If he still needs the nocturnal feeds, recommend changing back to glucerna, which will make his blood glucose less labile.

## 2018-05-06 NOTE — SUBJECTIVE & OBJECTIVE
"Interval HPI:   Overnight events: BG elevated to 300-400 this AM after feeds. Reports eating >75% of meals and wants to know if he really needs to continue on feeds.  Eatin%  Nausea: No  Hypoglycemia and intervention: No  Fever: No  TPN and/or TF: Yes  If yes, type of TF/TPN and rate: Novasource renal @ 30/hr - 8p-8a    /71 (BP Location: Left arm, Patient Position: Lying)   Pulse (!) 125   Temp 97.4 °F (36.3 °C) (Oral)   Resp 18   Ht 5' 7" (1.702 m)   Wt 76.4 kg (168 lb 6.9 oz)   SpO2 (!) 94%   BMI 26.38 kg/m²     Labs Reviewed and Include      Recent Labs  Lab 18  0600   *   CALCIUM 9.0   ALBUMIN 2.2*   PROT 6.4   *   K 5.0   CO2 22*   CL 94*   BUN 40*   CREATININE 3.0*   ALKPHOS 166*   ALT 9*   AST 21   BILITOT 0.4     Lab Results   Component Value Date    WBC 5.86 2018    HGB 8.8 (L) 2018    HCT 28.3 (L) 2018    MCV 96 2018     (H) 2018       Recent Labs  Lab 18  0600   TSH 4.793*   FREET4 0.77     Lab Results   Component Value Date    HGBA1C 5.1 2018       Nutritional status:   Body mass index is 26.38 kg/m².  Lab Results   Component Value Date    ALBUMIN 2.2 (L) 2018    ALBUMIN 2.3 (L) 2018    ALBUMIN 2.1 (L) 2018     Lab Results   Component Value Date    PREALBUMIN 13 (L) 2018    PREALBUMIN 5 (L) 03/15/2018    PREALBUMIN 18 (L) 2015       Estimated Creatinine Clearance: 29.4 mL/min (A) (based on SCr of 3 mg/dL (H)).    Accu-Checks  Recent Labs      18   1659  18   2114  18   0124  18   0826  18   1209  18   1723  18   2222  18   0204  18   0758  18   1243   POCTGLUCOSE  112*  154*  245*  321*  321*  176*  217*  293*  418*  346*       Current Medications and/or Treatments Impacting Glycemic Control  Immunotherapy:  Immunosuppressants         Stop Route Frequency     tacrolimus capsule 5 mg      -- Oral 2 times daily     mycophenolate " capsule 250 mg      -- Oral 2 times daily        Steroids:   Hormones     Start     Stop Route Frequency Ordered    05/01/18 0900  predniSONE tablet 5 mg      -- Oral Daily 04/30/18 1513        Pressors:    Autonomic Drugs     Start     Stop Route Frequency Ordered    04/30/18 0705  midodrine tablet 15 mg      -- Oral As needed (PRN) 04/30/18 0707    04/25/18 0900  midodrine tablet 10 mg      -- Oral 3 times daily 04/25/18 0650        Hyperglycemia/Diabetes Medications: Antihyperglycemics     Start     Stop Route Frequency Ordered    05/06/18 0815  insulin aspart U-100 pen 3 Units      -- SubQ 3 times daily with meals 05/06/18 0803    04/26/18 2000  insulin aspart U-100 pen 0-5 Units      -- SubQ Before meals & nightly PRN 04/26/18 1904    04/11/18 0015  insulin regular (Humulin R) 100 Units in sodium chloride 0.9% 100 mL infusion     Question:  Insulin Rate Adjustment (DO NOT MODIFY ANSWER)  Answer:  [email or web address]    -- IV Continuous 04/10/18 2312

## 2018-05-06 NOTE — PROGRESS NOTES
[facility]  Heart Transplant  Progress Note    Patient Name: Lv Crocker  MRN: [id number]  Admission Date: 3/11/2018  Hospital Length of Stay: 55 days  Attending Physician: Alexandr Hernández MD  Primary Care Provider: Philippe Mohr MD  Principal Problem:Acute respiratory failure with hypoxia    Subjective:     Interval History: No more fever overnight. Went back on trach collar overnight for about 30 minutes due to coughing fit but back on RA this morning.    Continuous Infusions:   insulin (HUMAN R) infusion (adults) 0.6 Units/hr (05/06/18 0803)     Scheduled Meds:   [START ON 5/7/2018] sodium chloride 0.9%   Intravenous Once    acetaminophen  650 mg Oral TID    cetirizine  5 mg Oral Daily    epoetin jose miguel (PROCRIT) injection  100 Units/kg (Dosing Weight) Intravenous Every Mon, Wed, Fri    escitalopram oxalate  20 mg Oral Daily    famotidine  20 mg Oral QHS    guaiFENesin  600 mg Oral BID    heparin (porcine)  5,000 Units Subcutaneous Q12H    insulin aspart U-100  3 Units Subcutaneous TIDWM    levothyroxine  137 mcg Oral Before breakfast    midodrine  10 mg Oral TID    mycophenolate  250 mg Oral BID    polyethylene glycol  17 g Oral BID    predniSONE  5 mg Oral Daily    QUEtiapine  50 mg Oral QHS    tacrolimus  5 mg Oral BID     PRN Meds:sodium chloride, [START ON 5/7/2018] sodium chloride 0.9%, albuterol-ipratropium 2.5mg-0.5mg/3mL, ALPRAZolam, benzonatate, dextrose 50%, dextrose 50%, glucagon (human recombinant), glucose, glucose, heparin (porcine), insulin aspart U-100, midodrine, ondansetron, oxyCODONE    Review of patient's allergies indicates:   Allergen Reactions    No known drug allergies      Objective:     Vital Signs (Most Recent):  Temp: 97.4 °F (36.3 °C) (05/06/18 1221)  Pulse: (!) 125 (05/06/18 1221)  Resp: 18 (05/06/18 1221)  BP: 112/71 (05/06/18 1221)  SpO2: (!) 94 % (05/06/18 1221) Vital Signs (24h Range):  Temp:  [97.4 °F (36.3 °C)-98.9 °F (37.2 °C)]  97.4 °F (36.3 °C)  Pulse:  [] 125  Resp:  [15-22] 18  SpO2:  [84 %-98 %] 94 %  BP: (100-126)/(59-80) 112/71     Patient Vitals for the past 72 hrs (Last 3 readings):   Weight   05/05/18 0615 76.4 kg (168 lb 6.9 oz)   05/04/18 0400 75.6 kg (166 lb 10.7 oz)     Body mass index is 26.38 kg/m².      Intake/Output Summary (Last 24 hours) at 05/06/18 1302  Last data filed at 05/06/18 0625   Gross per 24 hour   Intake          2169.49 ml   Output                0 ml   Net          2169.49 ml       Hemodynamic Parameters:           Physical Exam   Constitutional: He is oriented to person, place, and time. He appears well-developed and well-nourished.   Neck: Normal range of motion. Neck supple. No JVD present.   Cardiovascular: Normal rate and regular rhythm.  Exam reveals no gallop and no friction rub.    No murmur heard.  Pulmonary/Chest: Effort normal and breath sounds normal. He has no wheezes. He has no rales.   Trach in place   Abdominal: Soft. Bowel sounds are normal. There is no tenderness.   Musculoskeletal: He exhibits no edema.   Neurological: He is alert and oriented to person, place, and time.   Skin: Skin is warm and dry.   RLE- 1st and 3rd gangrenous toes       Significant Labs:  CBC:    Recent Labs  Lab 05/04/18  0500 05/05/18  0600 05/06/18  0600   WBC 4.09 5.23 5.86   RBC 2.22* 2.85* 2.96*   HGB 6.9* 8.5* 8.8*   HCT 23.0* 27.3* 28.3*    320 359*   * 96 96   MCH 31.1* 29.8 29.7   MCHC 30.0* 31.1* 31.1*     BNP:  No results for input(s): BNP in the last 168 hours.    Invalid input(s): BNPTRIAGELBLO  CMP:    Recent Labs  Lab 05/04/18  0500 05/05/18  0600 05/06/18  0600   * 279* 385*   CALCIUM 9.2 8.8 9.0   ALBUMIN 2.1* 2.3* 2.2*   PROT 6.2 6.2 6.4   * 134* 129*   K 3.9 4.4 5.0   CO2 25 24 22*   CL 94* 99 94*   BUN 35* 22* 40*   CREATININE 3.2* 2.2* 3.0*   ALKPHOS 178* 174* 166*   ALT 13 10 9*   AST 17 15 21   BILITOT 0.4 0.5 0.4      Coagulation:   No results for input(s): PT,  INR, APTT in the last 168 hours.  LDH:  No results for input(s): LDH in the last 72 hours.  Microbiology:  Microbiology Results (last 7 days)     Procedure Component Value Units Date/Time    Blood culture [[id number]] Collected:  05/05/18 0644    Order Status:  Completed Specimen:  Blood from Midline, Basilic, Right Updated:  05/06/18 1012     Blood Culture, Routine No Growth to date     Blood Culture, Routine No Growth to date    Blood culture [[id number]] Collected:  05/05/18 0812    Order Status:  Completed Specimen:  Blood Updated:  05/06/18 1012     Blood Culture, Routine No Growth to date     Blood Culture, Routine No Growth to date    Narrative:       Collection has been rescheduled by CDP at 5/5/2018 07:40 Reason: Due   now  Collection has been rescheduled by CDP at 5/5/2018 07:40 Reason: Due   now    Culture, Respiratory with Gram Stain [[id number]]     Order Status:  No result Specimen:  Respiratory     Respiratory Viral Panel by PCR [facility]; Nasal Swab [[id number]] Collected:  05/05/18 0644    Order Status:  Sent Specimen:  Respiratory Updated:  05/05/18 0802    Blood culture [[id number]]     Order Status:  Canceled Specimen:  Blood     Culture, Respiratory with Gram Stain [[id number]]     Order Status:  No result Specimen:  Respiratory from Tracheal Aspirate     Fungus culture [[id number]] Collected:  03/28/18 1607    Order Status:  Completed Specimen:  Body Fluid from Lung, RLL Updated:  05/01/18 1347     Fungus (Mycology) Culture No fungus isolated after 4 weeks    Culture, Respiratory with Gram Stain [[id number]] Collected:  04/26/18 1329    Order Status:  Completed Specimen:  Respiratory from Tracheal Aspirate Updated:  04/30/18 1253     Respiratory Culture Normal respiratory hank     Gram Stain (Respiratory) <10 epithelial cells per low power field.     Gram Stain (Respiratory) No WBC's     Gram Stain (Respiratory) Rare Gram negative rods          I have reviewed all pertinent labs within the past 24  hours.    Estimated Creatinine Clearance: 29.4 mL/min (A) (based on SCr of 3 mg/dL (H)).    Diagnostic Results:  I have reviewed all pertinent imaging results/findings within the past 24 hours.    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 Most recently has had ongoing issues with his lungs, had gotten 2 thoracenteses, after which he underwent VATS 01/19/18 followed by pleurex catheter placement and effusion drainage 01/11/18, subsequently had it removed 02/07/18 after drainage had decreased despite multiple attempts to drain it. Has been having significant coughing fits that result in him being near-syncopal at times, although difficult to say if he has passed out or not- patient most recently was admitted to the hospital for increased AGUILAR, coughing and pre-syncope 02/11-02/14/18 at [facility].   Patient was started on antibiotics for suspected bacterial infection, with some diarrhea, bronchitis, and possible pneumonia. Ct chest showed some pleural fluid collection and after talking with Dr. James, we arranged for him to receive a diagnostic tap with IR, from which the fluid collection demonstrated no growth or significant findings at all really. Cell counts do not appear to have been sent but will recheck. Patient states since his hospital stay, yesterday had a significant coughing fit again, after which he nearly passed out, is being seen in clinic today for this. Denies any cardiopulmonary complaints, no leg swelling, has some abdominal swelling, which is moderate for him. Denies significant shortness of breath with mild exertion, had 6MWT yesterday to see if he qualified for home O2, and his O2 sats actually improved after recovery from where he started initially. He and his wife admit he is in bed most days, not very  active.     Mr. Crocker presented to the ED 3/11/18 with approximately 3 weeks of worsening diarrhea and 2-3 days of sinus pressure, drainage, and worsened cough.  He notes that he had a coughing fit last night and passed out, coughed throughout most of the night.  This also happened on 2/25/18.  He last saw Dr Ferreira in clinic on 2/20/18 where he was taken off myfortic and switched to cellcept (he hadn't been on cellcept because of leukopenia when they tried post-transplant).  Though his diarrhea had improved after his last discharge, he states it has increased now to 15x/day, clear/yellow/green, no fevers, no exacerbating foods per say.  He was taken back off the cellcept and placed back on myfortic this past week and has not noticed immediate change in diarrhea. He also reports his baseline coughing fits havent improved and are minimally responsive to tessalon pearls.  Came on last night, he lost consciousness during a fit once which is relatively normal for him, and had two more during HPI.  He notes no sick contacts but does state he's had some URI symptoms as above.  His baseline vitals are usually -120 and BP 90s/60s-110s/70s.  He reports a history of intermittent diarrhea - did have Cdiff many years ago but this smells different.  No abdominal pain, no nausea, no vomiting.  No blood in diarrhea.  Appears last c-scope in 2015 with no evidence of infection or inflammatory processes.  He does report IBS which is different that this.       * Acute respiratory failure with hypoxia    - Resolved. S/P Bronch and intubation 3/14 now post tracheostomy due to inability to extubate  - Etiology = RSV   - Pulmonology signed off. Completely weaned off vent.   - Appreciate PT/OT/Wound care.        Fever    -No fever for over 24 hours  -CXR- no new process  - Resp Viral panel pending, Bl Cultures NGTD  -Unable to collect sputum cx  -Will start Abx if he has another fever         Heart transplanted    - TTE 4/24/18  showed normal graft function but has known history of restrictive CM post transplant.  - Continue Prednisone 5mg daily and  Tacrolimus 5/5 with plan to adjust as needed for goal 6-10. Resumed low dose cellcept         Alteration in skin integrity    - wound care following        Restrictive cardiomyopathy    - No changes (see above)         Type 1 diabetes mellitus with stage 3 chronic kidney disease    - Appreciate Endocrine's recs  - Insulin gtt per endocrine recs. Adjust as needed  - per endocrine please call when he is off tube feeds to adjust ggt and transition to rehab/outpatient regimen        Hypothyroidism due to Hashimoto's thyroiditis    - Appreciate Endocrine's recs  - Switched to PO Levothyroxine 75mcg daily        Acute renal failure with acute tubular necrosis superimposed on stage 3 chronic kidney disease    - Appreciate Nephrology recs.   - Continue midodrine  - HD per nephrology SAVANNA ZARATE         Anemia    - Last transfusion 5/4/18  - Nephrology has resumed ProCrit         Debility    - PT/OT/SLP daily. Recommending rehab.  - Nutrition following.   - Passed MBSS with speech. Started regular diet with thin liquids, but will continue tube feeds nocturnal until eating 75% of meals         Anxiety    - Increased Lexapro to 20mg daily  - Continue seroquel 50 QHS   - Continue .5mg xanax PRN Q8H   - Appreciate psychiatry input.             Corinne Presley PA-C  Heart Transplant  [facility]

## 2018-05-06 NOTE — SUBJECTIVE & OBJECTIVE
Interval History: No more fever overnight. Went back on trach collar overnight for about 30 minutes due to coughing fit but back on RA this morning.    Continuous Infusions:   insulin (HUMAN R) infusion (adults) 0.6 Units/hr (05/06/18 0803)     Scheduled Meds:   [START ON 5/7/2018] sodium chloride 0.9%   Intravenous Once    acetaminophen  650 mg Oral TID    cetirizine  5 mg Oral Daily    epoetin jose miguel (PROCRIT) injection  100 Units/kg (Dosing Weight) Intravenous Every Mon, Wed, Fri    escitalopram oxalate  20 mg Oral Daily    famotidine  20 mg Oral QHS    guaiFENesin  600 mg Oral BID    heparin (porcine)  5,000 Units Subcutaneous Q12H    insulin aspart U-100  3 Units Subcutaneous TIDWM    levothyroxine  137 mcg Oral Before breakfast    midodrine  10 mg Oral TID    mycophenolate  250 mg Oral BID    polyethylene glycol  17 g Oral BID    predniSONE  5 mg Oral Daily    QUEtiapine  50 mg Oral QHS    tacrolimus  5 mg Oral BID     PRN Meds:sodium chloride, [START ON 5/7/2018] sodium chloride 0.9%, albuterol-ipratropium 2.5mg-0.5mg/3mL, ALPRAZolam, benzonatate, dextrose 50%, dextrose 50%, glucagon (human recombinant), glucose, glucose, heparin (porcine), insulin aspart U-100, midodrine, ondansetron, oxyCODONE    Review of patient's allergies indicates:   Allergen Reactions    No known drug allergies      Objective:     Vital Signs (Most Recent):  Temp: 97.4 °F (36.3 °C) (05/06/18 1221)  Pulse: (!) 125 (05/06/18 1221)  Resp: 18 (05/06/18 1221)  BP: 112/71 (05/06/18 1221)  SpO2: (!) 94 % (05/06/18 1221) Vital Signs (24h Range):  Temp:  [97.4 °F (36.3 °C)-98.9 °F (37.2 °C)] 97.4 °F (36.3 °C)  Pulse:  [] 125  Resp:  [15-22] 18  SpO2:  [84 %-98 %] 94 %  BP: (100-126)/(59-80) 112/71     Patient Vitals for the past 72 hrs (Last 3 readings):   Weight   05/05/18 0615 76.4 kg (168 lb 6.9 oz)   05/04/18 0400 75.6 kg (166 lb 10.7 oz)     Body mass index is 26.38 kg/m².      Intake/Output Summary (Last 24 hours)  at 05/06/18 1302  Last data filed at 05/06/18 0625   Gross per 24 hour   Intake          2169.49 ml   Output                0 ml   Net          2169.49 ml       Hemodynamic Parameters:           Physical Exam   Constitutional: He is oriented to person, place, and time. He appears well-developed and well-nourished.   Neck: Normal range of motion. Neck supple. No JVD present.   Cardiovascular: Normal rate and regular rhythm.  Exam reveals no gallop and no friction rub.    No murmur heard.  Pulmonary/Chest: Effort normal and breath sounds normal. He has no wheezes. He has no rales.   Trach in place   Abdominal: Soft. Bowel sounds are normal. There is no tenderness.   Musculoskeletal: He exhibits no edema.   Neurological: He is alert and oriented to person, place, and time.   Skin: Skin is warm and dry.   RLE- 1st and 3rd gangrenous toes       Significant Labs:  CBC:    Recent Labs  Lab 05/04/18  0500 05/05/18  0600 05/06/18  0600   WBC 4.09 5.23 5.86   RBC 2.22* 2.85* 2.96*   HGB 6.9* 8.5* 8.8*   HCT 23.0* 27.3* 28.3*    320 359*   * 96 96   MCH 31.1* 29.8 29.7   MCHC 30.0* 31.1* 31.1*     BNP:  No results for input(s): BNP in the last 168 hours.    Invalid input(s): BNPTRIAGELBLO  CMP:    Recent Labs  Lab 05/04/18  0500 05/05/18  0600 05/06/18  0600   * 279* 385*   CALCIUM 9.2 8.8 9.0   ALBUMIN 2.1* 2.3* 2.2*   PROT 6.2 6.2 6.4   * 134* 129*   K 3.9 4.4 5.0   CO2 25 24 22*   CL 94* 99 94*   BUN 35* 22* 40*   CREATININE 3.2* 2.2* 3.0*   ALKPHOS 178* 174* 166*   ALT 13 10 9*   AST 17 15 21   BILITOT 0.4 0.5 0.4      Coagulation:   No results for input(s): PT, INR, APTT in the last 168 hours.  LDH:  No results for input(s): LDH in the last 72 hours.  Microbiology:  Microbiology Results (last 7 days)     Procedure Component Value Units Date/Time    Blood culture [[id number]] Collected:  05/05/18 0644    Order Status:  Completed Specimen:  Blood from Midline, Basilic, Right Updated:  05/06/18 1012      Blood Culture, Routine No Growth to date     Blood Culture, Routine No Growth to date    Blood culture [[id number]] Collected:  05/05/18 0812    Order Status:  Completed Specimen:  Blood Updated:  05/06/18 1012     Blood Culture, Routine No Growth to date     Blood Culture, Routine No Growth to date    Narrative:       Collection has been rescheduled by CDP at 5/5/2018 07:40 Reason: Due   now  Collection has been rescheduled by CDP at 5/5/2018 07:40 Reason: Due   now    Culture, Respiratory with Gram Stain [[id number]]     Order Status:  No result Specimen:  Respiratory     Respiratory Viral Panel by PCR [facility]; Nasal Swab [[id number]] Collected:  05/05/18 0644    Order Status:  Sent Specimen:  Respiratory Updated:  05/05/18 0802    Blood culture [[id number]]     Order Status:  Canceled Specimen:  Blood     Culture, Respiratory with Gram Stain [[id number]]     Order Status:  No result Specimen:  Respiratory from Tracheal Aspirate     Fungus culture [[id number]] Collected:  03/28/18 1607    Order Status:  Completed Specimen:  Body Fluid from Lung, RLL Updated:  05/01/18 1347     Fungus (Mycology) Culture No fungus isolated after 4 weeks    Culture, Respiratory with Gram Stain [[id number]] Collected:  04/26/18 1329    Order Status:  Completed Specimen:  Respiratory from Tracheal Aspirate Updated:  04/30/18 1253     Respiratory Culture Normal respiratory hank     Gram Stain (Respiratory) <10 epithelial cells per low power field.     Gram Stain (Respiratory) No WBC's     Gram Stain (Respiratory) Rare Gram negative rods          I have reviewed all pertinent labs within the past 24 hours.    Estimated Creatinine Clearance: 29.4 mL/min (A) (based on SCr of 3 mg/dL (H)).    Diagnostic Results:  I have reviewed all pertinent imaging results/findings within the past 24 hours.

## 2018-05-06 NOTE — ASSESSMENT & PLAN NOTE
-No fever for over 24 hours  -CXR- no new process  - Resp Viral panel pending, Bl Cultures NGTD  -Unable to collect sputum cx  -Will start Abx if he has another fever

## 2018-05-07 PROBLEM — N18.6 ESRD (END STAGE RENAL DISEASE): Status: ACTIVE | Noted: 2018-01-01

## 2018-05-07 PROBLEM — R14.0 ABDOMINAL DISTENTION: Status: ACTIVE | Noted: 2018-01-01

## 2018-05-07 NOTE — ASSESSMENT & PLAN NOTE
Estimated Creatinine Clearance: 25.2 mL/min (A) (based on SCr of 3.5 mg/dL (H)).  Avoid insulin stacking

## 2018-05-07 NOTE — SUBJECTIVE & OBJECTIVE
"Interval History: Patient feels like he need tummy drain and "full.". Remains anuric. Net positive 1.8 L.Seen in [facility] and tolerating 2 L UF.       Review of patient's allergies indicates:   Allergen Reactions    No known drug allergies      Current Facility-Administered Medications   Medication Frequency    0.9%  NaCl infusion (for blood administration) Q24H PRN    0.9%  NaCl infusion PRN    0.9%  NaCl infusion Once    acetaminophen tablet 650 mg TID    albumin human 25% bottle 25 g Once    albuterol-ipratropium 2.5mg-0.5mg/3mL nebulizer solution 3 mL Q4H PRN    ALPRAZolam tablet 0.5 mg Q6H PRN    benzonatate capsule 100 mg TID PRN    calcium carbonate 200 mg calcium (500 mg) chewable tablet 500 mg BID PRN    cetirizine tablet 5 mg Daily    dextrose 50% injection 12.5 g PRN    dextrose 50% injection 25 g PRN    epoetin jose miguel injection 8,100 Units Every Mon, Wed, Fri    escitalopram oxalate tablet 20 mg Daily    famotidine tablet 20 mg QHS    glucagon (human recombinant) injection 1 mg PRN    glucose chewable tablet 16 g PRN    glucose chewable tablet 24 g PRN    guaiFENesin 12 hr tablet 600 mg BID    heparin (porcine) injection 1,000 Units PRN    heparin (porcine) injection 5,000 Units Q12H    insulin aspart U-100 pen 0-5 Units QID (AC + HS) PRN    insulin aspart U-100 pen 3 Units TIDWM    insulin regular (Humulin R) 100 Units in sodium chloride 0.9% 100 mL infusion Continuous    levothyroxine tablet 137 mcg Before breakfast    midodrine tablet 10 mg TID    midodrine tablet 15 mg PRN    mycophenolate capsule 250 mg BID    ondansetron disintegrating tablet 4 mg Q6H PRN    oxyCODONE immediate release tablet 5 mg Q6H PRN    polyethylene glycol packet 17 g BID    predniSONE tablet 5 mg Daily    QUEtiapine tablet 50 mg QHS    tacrolimus capsule 5 mg BID       Objective:     Vital Signs (Most Recent):  Temp: 98.1 °F (36.7 °C) (05/07/18 0800)  Pulse: (!) 113 (05/07/18 1015)  Resp: 20 " (05/07/18 1015)  BP: 120/72 (05/07/18 1015)  SpO2: (!) 84 % (05/07/18 0730)  O2 Device (Oxygen Therapy): room air (05/07/18 0730) Vital Signs (24h Range):  Temp:  [97.4 °F (36.3 °C)-98.5 °F (36.9 °C)] 98.1 °F (36.7 °C)  Pulse:  [] 113  Resp:  [16-20] 20  SpO2:  [84 %-97 %] 84 %  BP: (107-137)/(64-82) 120/72     Weight: 77.4 kg (170 lb 10.2 oz) (05/07/18 0400)  Body mass index is 26.73 kg/m².  Body surface area is 1.91 meters squared.    I/O last 3 completed shifts:  In: 2721.8 [P.O.:1790; I.V.:31.8; NG/GT:900]  Out: 0     Physical Exam   Constitutional: He appears well-developed. No distress.   Trach   HENT:   Head: Normocephalic and atraumatic.   Eyes: Conjunctivae and EOM are normal.   Cardiovascular: Regular rhythm.  Tachycardia present.    Pulmonary/Chest: He has no wheezes. He has no rales.   L IJ TDC   Abdominal: Soft. He exhibits no distension.   LUQ PEG     Neurological: He is alert.   Skin:   Right necrotic toes 1-3  Left great toe necrotic-same       Significant Labs:  All labs within the past 24 hours have been reviewed.     Significant Imaging:  Labs: Reviewed

## 2018-05-07 NOTE — NURSING
Transported to acute dialysis via bed accompanied by transport, phone report called to Adriana Cuadra RN

## 2018-05-07 NOTE — PROGRESS NOTES
"[facility]  Endocrinology  Progress Note    Admit Date: 3/11/2018     Reason for Consult: abnormal TFTs    Surgical Procedure and Date: s/p heart transplant      Diabetes diagnosis year: 5yo (T1DM)     Home Diabetes Medications:    Levemir 15u qHS  Novolog 4u AC     How often checking glucose at home? 4 times daily   BG readings on regimen: 150-200s  Hypoglycemia on the regimen?  Yes, rarely - treats appropriately (light snack, glucose tab)  Missed doses on regimen?  No        Diabetes Complications include:     Hyperglycemia, Hypoglycemia  and Diabetic chronic kidney disease          Complicating diabetes co morbidities:   N/A     HPI:   Patient is a 42 y.o. male with a diagnosis of T1DM, HTN, hypothyroidism, s/p heart transplant.  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 He required serial paracentesis and thoracentesis until he underwent a VATS with pleurX catheter placement on 2018 and removed 18.       Presented to hospital secondary to diarrhea and cough.  Upon initial labs, TSH was decreased (0.101) and free T4 1.33.  Endocrinology consulted to assist in management of these labs.  He was last seen in clinic by Kamala Vázquez NP 2017.   Previous hospitalization, endocrine consulted for same problem.  During that visit, recommended decreasing LT4 to 125mcg daily. Unfortunately, patient was discharged on previous dose of 150mcg daily.     Interval HPI:   Overnight events: none  Eatin%  Nausea: No  Hypoglycemia and intervention: No  Fever: No  TPN and/or TF: Yes  If yes, type of TF/TPN and rate: novasource renal at 30/hr - 8p-8a    /67   Pulse (!) 114   Temp 98.1 °F (36.7 °C) (Oral)   Resp 20   Ht 5' 7" (1.702 m)   Wt 77.4 kg (170 lb 10.2 oz)   SpO2 (!) 84%   BMI 26.73 kg/m²       Labs Reviewed and Include      Recent Labs  Lab 18  0600   GLU " 225*   CALCIUM 9.4   ALBUMIN 2.3*   PROT 6.2   *   K 4.8   CO2 23   CL 95   BUN 55*   CREATININE 3.5*   ALKPHOS 170*   ALT 11   AST 13   BILITOT 0.4     Lab Results   Component Value Date    WBC 6.65 05/07/2018    HGB 8.9 (L) 05/07/2018    HCT 28.1 (L) 05/07/2018    MCV 94 05/07/2018     (H) 05/07/2018       Recent Labs  Lab 05/05/18  0600   TSH 4.793*   FREET4 0.77     Lab Results   Component Value Date    HGBA1C 5.1 04/05/2018       Nutritional status:   Body mass index is 26.73 kg/m².  Lab Results   Component Value Date    ALBUMIN 2.3 (L) 05/07/2018    ALBUMIN 2.2 (L) 05/06/2018    ALBUMIN 2.3 (L) 05/05/2018     Lab Results   Component Value Date    PREALBUMIN 13 (L) 04/08/2018    PREALBUMIN 5 (L) 03/15/2018    PREALBUMIN 18 (L) 03/24/2015       Estimated Creatinine Clearance: 25.2 mL/min (A) (based on SCr of 3.5 mg/dL (H)).    Accu-Checks  Recent Labs      05/05/18   1723  05/05/18   2222  05/06/18   0204  05/06/18   0758  05/06/18   1243  05/06/18   1818  05/06/18   2210  05/07/18   0237  05/07/18   0716  05/07/18   0902   POCTGLUCOSE  176*  217*  293*  418*  346*  107  133*  184*  226*  184*       Current Medications and/or Treatments Impacting Glycemic Control  Immunotherapy:  Immunosuppressants         Stop Route Frequency     tacrolimus capsule 5 mg      -- Oral 2 times daily     mycophenolate capsule 250 mg      -- Oral 2 times daily        Steroids:   Hormones     Start     Stop Route Frequency Ordered    05/01/18 0900  predniSONE tablet 5 mg      -- Oral Daily 04/30/18 1513        Pressors:    Autonomic Drugs     Start     Stop Route Frequency Ordered    04/30/18 0705  midodrine tablet 15 mg      -- Oral As needed (PRN) 04/30/18 0707    04/25/18 0900  midodrine tablet 10 mg      -- Oral 3 times daily 04/25/18 0650        Hyperglycemia/Diabetes Medications: Antihyperglycemics     Start     Stop Route Frequency Ordered    05/06/18 0815  insulin aspart U-100 pen 3 Units      -- SubQ 3 times  daily with meals 05/06/18 0803    04/26/18 2000  insulin aspart U-100 pen 0-5 Units      -- SubQ Before meals & nightly PRN 04/26/18 1904    04/11/18 0015  insulin regular (Humulin R) 100 Units in sodium chloride 0.9% 100 mL infusion     Question:  Insulin Rate Adjustment (DO NOT MODIFY ANSWER)  Answer:  [email or web address]    -- IV Continuous 04/10/18 2313          ASSESSMENT and PLAN    * Acute respiratory failure with hypoxia    Per primary        Type 1 diabetes mellitus with stage 3 chronic kidney disease    BG goal 140-180,       Will need change in infusion rates when TF on:  1.1 > 1.3 units/hr when tube feeds on (8p-8a)  0.5 > 0.6 units/hr when tube feeds off (8a-8p)  Increase novolog 3 units with meals  Low dose correction  POC glucose ac/hs/0200    Paged primary team regarding need for ongoing feeds (see above). Awaiting call back to discuss.    Notify endocrine for changes in nutrition status (diet, TF, TPN)    Pt is T1DM, do not suspend insulin >1 hr   If TF held, decrease insulin rate to 0.5u/hr     Discharge Recommendations:  TBD        Hypothyroidism due to Hashimoto's thyroiditis    Continue LT4 137 mcg/day - give when TF off.  TSH improving.        Current chronic use of systemic steroids    Can increase insulin requirement        ESRD (end stage renal disease)    Titrate cautiously.  Caution with insulin stacking.  Avoid hypoglycemia.        Acute renal failure with acute tubular necrosis superimposed on stage 3 chronic kidney disease    Estimated Creatinine Clearance: 25.2 mL/min (A) (based on SCr of 3.5 mg/dL (H)).  Avoid insulin stacking          Heart transplanted    Per transplant  Avoid hypoglycemia  On PDN and prograf - could lead to prandial elevations and insulin resistance.            Ankur Keith MD  Endocrinology  [facility]

## 2018-05-07 NOTE — ASSESSMENT & PLAN NOTE
- PT/OT/SLP daily. Recommending rehab.  - Nutrition following.   - Passed MBSS with speech. Started regular diet with thin liquids, but will continue tube feeds nocturnal until eating 75% of meals   -Rehab here may be able to take him with HD and trach- will discuss with their team today

## 2018-05-07 NOTE — PLAN OF CARE
Problem: Occupational Therapy Goal  Goal: Occupational Therapy Goal  Goals to be met by: 5/8/18    Pt will follow 75% of motor commands with <2 verbal cues for repetition of task to maximize engagement in grooming task.--MET  Pt will complete feeding with mod A.   Pt will complete supine with HOB slightly elevated to seated at EOB with mod A in prep for seated grooming task.  Pt will engage in BUE exercises in orders to increase strength to 3+/5 in BUE's to increase engagement in seated grooming task  Pt will sit at EOB for approx 10 minutes with mod A and unilateral UE support to complete grooming task  Pt will complete sit>stand from EOB with bed in lowest position with mod A in prep for transfer to [facility]      Outcome: Ongoing (interventions implemented as appropriate)  con't with goals  Delia To, LILYR

## 2018-05-07 NOTE — PLAN OF CARE
Problem: Physical Therapy Goal  Goal: Physical Therapy Goal  Goals to be met by: 18     Patient will increase functional independence with mobility by performin. Supine to sit with Moderate Assistance.  2. Sit to supine with Moderate Assistance.  3. Rolling to Left and Right with Minimal Assistance.   4. Sit to stand transfer with Moderate Assistance.  5. Bed to chair transfer with Maximum Assistance.  6. Gait  x 5 feet with Minimal Assistance.   7.  Pt to perf and be compliant with LE HEP to improve vascularity and mm strength.           Outcome: Ongoing (interventions implemented as appropriate)  Progressing towards goals.    Corey Hernández DPT  2018

## 2018-05-07 NOTE — SUBJECTIVE & OBJECTIVE
"Interval HPI:   Overnight events: none  Eatin%  Nausea: No  Hypoglycemia and intervention: No  Fever: No  TPN and/or TF: Yes  If yes, type of TF/TPN and rate: novasource renal at 30/hr - 8p-8a    /67   Pulse (!) 114   Temp 98.1 °F (36.7 °C) (Oral)   Resp 20   Ht 5' 7" (1.702 m)   Wt 77.4 kg (170 lb 10.2 oz)   SpO2 (!) 84%   BMI 26.73 kg/m²     Labs Reviewed and Include      Recent Labs  Lab 18  0600   *   CALCIUM 9.4   ALBUMIN 2.3*   PROT 6.2   *   K 4.8   CO2 23   CL 95   BUN 55*   CREATININE 3.5*   ALKPHOS 170*   ALT 11   AST 13   BILITOT 0.4     Lab Results   Component Value Date    WBC 6.65 2018    HGB 8.9 (L) 2018    HCT 28.1 (L) 2018    MCV 94 2018     (H) 2018       Recent Labs  Lab 18  0600   TSH 4.793*   FREET4 0.77     Lab Results   Component Value Date    HGBA1C 5.1 2018       Nutritional status:   Body mass index is 26.73 kg/m².  Lab Results   Component Value Date    ALBUMIN 2.3 (L) 2018    ALBUMIN 2.2 (L) 2018    ALBUMIN 2.3 (L) 2018     Lab Results   Component Value Date    PREALBUMIN 13 (L) 2018    PREALBUMIN 5 (L) 03/15/2018    PREALBUMIN 18 (L) 2015       Estimated Creatinine Clearance: 25.2 mL/min (A) (based on SCr of 3.5 mg/dL (H)).    Accu-Checks  Recent Labs      18   1723  18   2222  18   0204  18   0758  18   1243  18   1818  18   2210  18   0237  18   0716  18   0902   POCTGLUCOSE  176*  217*  293*  418*  346*  107  133*  184*  226*  184*       Current Medications and/or Treatments Impacting Glycemic Control  Immunotherapy:  Immunosuppressants         Stop Route Frequency     tacrolimus capsule 5 mg      -- Oral 2 times daily     mycophenolate capsule 250 mg      -- Oral 2 times daily        Steroids:   Hormones     Start     Stop Route Frequency Ordered    18 0900  predniSONE tablet 5 mg      -- Oral Daily " 04/30/18 1513        Pressors:    Autonomic Drugs     Start     Stop Route Frequency Ordered    04/30/18 0705  midodrine tablet 15 mg      -- Oral As needed (PRN) 04/30/18 0707    04/25/18 0900  midodrine tablet 10 mg      -- Oral 3 times daily 04/25/18 0650        Hyperglycemia/Diabetes Medications: Antihyperglycemics     Start     Stop Route Frequency Ordered    05/06/18 0815  insulin aspart U-100 pen 3 Units      -- SubQ 3 times daily with meals 05/06/18 0803    04/26/18 2000  insulin aspart U-100 pen 0-5 Units      -- SubQ Before meals & nightly PRN 04/26/18 1904    04/11/18 0015  insulin regular (Humulin R) 100 Units in sodium chloride 0.9% 100 mL infusion     Question:  Insulin Rate Adjustment (DO NOT MODIFY ANSWER)  Answer:  [email or web address] [id number]    -- IV Continuous 04/10/18 231

## 2018-05-07 NOTE — ASSESSMENT & PLAN NOTE
- Resolved. S/P Bronch and intubation 3/14 now post tracheostomy due to inability to extubate  - Etiology = RSV   - Pulmonology signed off. Completely weaned off vent.   - Appreciate PT/OT/Wound care.  -Discuss with Gen Surg the possibility of removing trach

## 2018-05-07 NOTE — ASSESSMENT & PLAN NOTE
- baseline sCr 2.6-3.0 consistent with CKD stage IV  - anuric CACHORRO likely ischemic ATN from prolonged pre-renal state (diarrhea) in setting of prograf renal vasoconstriction; cannot r/o septic ATN or Prograf toxicity  - SLED started 3/14. TDC placed 4/4/18.   - remains anuric  -Per Physical Med & Rehab, patient approve for [facility] inpatient rehab (opens May 1) when off insulin gtt. SW to assisting to outpatient HD that will take patient with Trach.     -Continue epo  -5/7 Remains anuric. Patient seen in [facility]. Tolerating 2 L UF.   Albumin and midodrine PRN. Continue MWF HD while inpatient.     Ascites  -Reports PTA paracentesis every 1-2 months with 2-3 L removal each time.   -Will discuss with primary team evaluation for paracentesis?    Anemia of CKD  -Iron 70, T sat 35, TIBC 225 and ferritan 1287  -Continue epo. Increased epo 5/4   -PRBC transfusion       BMD  Lab Results   Component Value Date    PTH 23.0 04/19/2018    CALCIUM 9.4 05/07/2018    CAION 0.98 (L) 03/17/2018    PHOS 3.1 05/07/2018

## 2018-05-07 NOTE — PT/OT/SLP PROGRESS
Speech Language Pathology  Attempted    Lv Crocker  MRN: [id number]    Patient not seen today secondary to dialysis, Unavailable (Comment). Will follow-up at a later date and time as pt is available and as able.  Thank you.    Jeanine Tabares, SRUTHI-SLP   5/7/2018

## 2018-05-07 NOTE — PROGRESS NOTES
[facility]  Nephrology  Progress Note    Patient Name: Lv Crocker  MRN: [id number]  Admission Date: 3/11/2018  Hospital Length of Stay: 56 days  Attending Provider: Alexandr Hernández MD   Primary Care Physician: Philippe Mohr MD  Principal Problem:Acute respiratory failure with hypoxia    Subjective:     HPI: Mr. Crocker is a 41 yo WM with T1DM, Hashimoto thyroiditis, h/o OHTx 11/2014, and CKD stage 4 who was admitted on 3/11/18 for persistent diarrhea. He reported a 3 week history of diarrhea up to 15x/day. Associated symptoms including URI symptoms, coughing fits, and coughing syncope. Prograf level was 14.3. His post-heart transplant course has been complicated by AMR 4/2015, CMV, post-transplant restrictive cardiomyopathy, recurrent pleural effusions/ascites requiring monthly thoracenteses/paracenteses, VATS 1/11/18 with Pleur-X catheter (now removed), and CKD stage 4 with baseline sCr 2.6-3.0. Baseline -120s and baseline BP 90s-110s/60-70s. Upon admission he was started on IVF and diarrhea workup was initiated. On 3/12 he was noted to have decreased UOP despite fluids; NS was increased to 100cc/hr. He was scheduled for a colonoscopy on 3/13 however procedure was cancelled due to hypoxia and fever. He was transferred to the ICU for closer monitoring. He continued to have oliguria overnight. Bladder scan revealed 200cc of urine but patient refused osullivan catheter placement. Wife reports that patient always has a hard time urinating again after osullivan catheters are placed/removed so he refuses them. He remained hypoxic despite FiO2 70% and ABG revealed combined respiratory and metabolic acidosis with pH 7.17. He was started on BiPAP as well as a bicarb infusion at 50cc/hr. ABG with mild improvement. AM labs revealed K 6.2 and he was shifted and given kayexalate.Trialysis catheter was placed this morning and he subsequently developed hypotension and was started on levophed. He  "was intubated later this morning. Nephrology consulted for CACHORRO. All history obtained by primary team and chart review as patient was intubated/sedated on exam. Consent for dialysis obtained by patient's wife and placed in chart. Of note, both of patient's parents were on dialysis.     Interval History: Patient feels like he need tummy drain and "full.". Remains anuric. Net positive 1.8 L.Seen in [facility] and tolerating 2 L UF.       Review of patient's allergies indicates:   Allergen Reactions    No known drug allergies      Current Facility-Administered Medications   Medication Frequency    0.9%  NaCl infusion (for blood administration) Q24H PRN    0.9%  NaCl infusion PRN    0.9%  NaCl infusion Once    acetaminophen tablet 650 mg TID    albumin human 25% bottle 25 g Once    albuterol-ipratropium 2.5mg-0.5mg/3mL nebulizer solution 3 mL Q4H PRN    ALPRAZolam tablet 0.5 mg Q6H PRN    benzonatate capsule 100 mg TID PRN    calcium carbonate 200 mg calcium (500 mg) chewable tablet 500 mg BID PRN    cetirizine tablet 5 mg Daily    dextrose 50% injection 12.5 g PRN    dextrose 50% injection 25 g PRN    epoetin jose miguel injection 8,100 Units Every Mon, Wed, Fri    escitalopram oxalate tablet 20 mg Daily    famotidine tablet 20 mg QHS    glucagon (human recombinant) injection 1 mg PRN    glucose chewable tablet 16 g PRN    glucose chewable tablet 24 g PRN    guaiFENesin 12 hr tablet 600 mg BID    heparin (porcine) injection 1,000 Units PRN    heparin (porcine) injection 5,000 Units Q12H    insulin aspart U-100 pen 0-5 Units QID (AC + HS) PRN    insulin aspart U-100 pen 3 Units TIDWM    insulin regular (Humulin R) 100 Units in sodium chloride 0.9% 100 mL infusion Continuous    levothyroxine tablet 137 mcg Before breakfast    midodrine tablet 10 mg TID    midodrine tablet 15 mg PRN    mycophenolate capsule 250 mg BID    ondansetron disintegrating tablet 4 mg Q6H PRN    oxyCODONE immediate release tablet 5 " mg Q6H PRN    polyethylene glycol packet 17 g BID    predniSONE tablet 5 mg Daily    QUEtiapine tablet 50 mg QHS    tacrolimus capsule 5 mg BID       Objective:     Vital Signs (Most Recent):  Temp: 98.1 °F (36.7 °C) (05/07/18 0800)  Pulse: (!) 113 (05/07/18 1015)  Resp: 20 (05/07/18 1015)  BP: 120/72 (05/07/18 1015)  SpO2: (!) 84 % (05/07/18 0730)  O2 Device (Oxygen Therapy): room air (05/07/18 0730) Vital Signs (24h Range):  Temp:  [97.4 °F (36.3 °C)-98.5 °F (36.9 °C)] 98.1 °F (36.7 °C)  Pulse:  [] 113  Resp:  [16-20] 20  SpO2:  [84 %-97 %] 84 %  BP: (107-137)/(64-82) 120/72     Weight: 77.4 kg (170 lb 10.2 oz) (05/07/18 0400)  Body mass index is 26.73 kg/m².  Body surface area is 1.91 meters squared.    I/O last 3 completed shifts:  In: 2721.8 [P.O.:1790; I.V.:31.8; NG/GT:900]  Out: 0     Physical Exam   Constitutional: He appears well-developed. No distress.   Trach   HENT:   Head: Normocephalic and atraumatic.   Eyes: Conjunctivae and EOM are normal.   Cardiovascular: Regular rhythm.  Tachycardia present.    Pulmonary/Chest: He has no wheezes. He has no rales.   L IJ TDC   Abdominal: Soft. He exhibits no distension.   LUQ PEG     Neurological: He is alert.   Skin:   Right necrotic toes 1-3  Left great toe necrotic-same       Significant Labs:  All labs within the past 24 hours have been reviewed.     Significant Imaging:  Labs: Reviewed    Assessment/Plan:     Acute renal failure with acute tubular necrosis superimposed on stage 3 chronic kidney disease    - baseline sCr 2.6-3.0 consistent with CKD stage IV  - anuric CACHORRO likely ischemic ATN from prolonged pre-renal state (diarrhea) in setting of prograf renal vasoconstriction; cannot r/o septic ATN or Prograf toxicity  - SLED started 3/14. TDC placed 4/4/18.   - remains anuric  -Per Physical Med & Rehab, patient approve for [facility] inpatient rehab (opens May 1) when off insulin gtt. SW to assisting to outpatient HD that will take patient with Trach.      -Continue epo  -5/7 Remains anuric. Patient seen in [facility]. Tolerating 2 L UF.   Albumin and midodrine PRN. Continue MWF HD while inpatient.     Ascites  -Reports PTA paracentesis every 1-2 months with 2-3 L removal each time.   -Will discuss with primary team evaluation for paracentesis?    Anemia of CKD  -Iron 70, T sat 35, TIBC 225 and ferritan 1287  -Continue epo. Increased epo 5/4   -PRBC transfusion       BMD  Lab Results   Component Value Date    PTH 23.0 04/19/2018    CALCIUM 9.4 05/07/2018    CAION 0.98 (L) 03/17/2018    PHOS 3.1 05/07/2018                 Thank you for your consult. I will follow-up with patient. Please contact us if you have any additional questions.    Amanda Cuellar NP  Nephrology  [facility]

## 2018-05-07 NOTE — ASSESSMENT & PLAN NOTE
-h/o paracenteses every moght prior to admission and prior to being on HD  -Has not had a BM in a few days. Add Colace to Miralax today  -Abd U/S to look for ascites and will consider paracentesis

## 2018-05-07 NOTE — PROGRESS NOTES
[facility]  Heart Transplant  Progress Note    Patient Name: Lv Crocker  MRN: [id number]  Admission Date: 3/11/2018  Hospital Length of Stay: 56 days  Attending Physician: Alexandr Hernández MD  Primary Care Provider: Philippe Mohr MD  Principal Problem:Acute respiratory failure with hypoxia    Subjective:     Interval History: C/o abd distension, worsening over past few days and now feeling full when he eats. Still c/o cough.     Continuous Infusions:   insulin (HUMAN R) infusion (adults) 0.6 Units/hr (05/07/18 0718)     Scheduled Meds:   acetaminophen  650 mg Oral TID    albumin human 25%  25 g Intravenous Once    cetirizine  5 mg Oral Daily    docusate sodium  100 mg Oral BID    epoetin jose miguel (PROCRIT) injection  100 Units/kg (Dosing Weight) Intravenous Every Mon, Wed, Fri    escitalopram oxalate  20 mg Oral Daily    famotidine  20 mg Oral QHS    guaiFENesin  1,200 mg Oral BID    guaiFENesin  600 mg Oral Once    heparin (porcine)  5,000 Units Subcutaneous Q12H    insulin aspart U-100  3 Units Subcutaneous TIDWM    levothyroxine  137 mcg Oral Before breakfast    midodrine  10 mg Oral TID    mycophenolate  250 mg Oral BID    polyethylene glycol  17 g Oral BID    predniSONE  5 mg Oral Daily    QUEtiapine  50 mg Oral QHS    tacrolimus  5 mg Oral BID     PRN Meds:sodium chloride, sodium chloride 0.9%, albuterol-ipratropium 2.5mg-0.5mg/3mL, ALPRAZolam, benzonatate, calcium carbonate, dextrose 50%, dextrose 50%, glucagon (human recombinant), glucose, glucose, heparin (porcine), insulin aspart U-100, midodrine, ondansetron, oxyCODONE    Review of patient's allergies indicates:   Allergen Reactions    No known drug allergies      Objective:     Vital Signs (Most Recent):  Temp: 98.4 °F (36.9 °C) (05/07/18 1212)  Pulse: (!) 120 (05/07/18 1212)  Resp: 16 (05/07/18 1212)  BP: 113/64 (05/07/18 1212)  SpO2: (!) 94 % (05/07/18 1212) Vital Signs (24h Range):  Temp:  [98.1 °F  (36.7 °C)-98.5 °F (36.9 °C)] 98.4 °F (36.9 °C)  Pulse:  [] 120  Resp:  [16-20] 16  SpO2:  [88 %-97 %] 94 %  BP: (104-137)/(64-82) 113/64     Patient Vitals for the past 72 hrs (Last 3 readings):   Weight   05/07/18 0400 77.4 kg (170 lb 10.2 oz)   05/05/18 0615 76.4 kg (168 lb 6.9 oz)     Body mass index is 26.73 kg/m².      Intake/Output Summary (Last 24 hours) at 05/07/18 1511  Last data filed at 05/07/18 1130   Gross per 24 hour   Intake          1091.65 ml   Output             2600 ml   Net         -1508.35 ml       Hemodynamic Parameters:           Physical Exam   Constitutional: He is oriented to person, place, and time. He appears well-developed and well-nourished.   Neck: Normal range of motion. Neck supple. No JVD present.   Cardiovascular: Normal rate and regular rhythm.  Exam reveals no gallop and no friction rub.    No murmur heard.  Pulmonary/Chest: Effort normal and breath sounds normal. He has no wheezes. He has no rales.   Trach in place   Abdominal: Soft. Bowel sounds are normal. He exhibits distension. There is no tenderness.   Musculoskeletal: He exhibits no edema.   Neurological: He is alert and oriented to person, place, and time.   Skin: Skin is warm and dry.   RLE- 1st and 3rd gangrenous toes       Significant Labs:  CBC:    Recent Labs  Lab 05/05/18  0600 05/06/18  0600 05/07/18  0600   WBC 5.23 5.86 6.65   RBC 2.85* 2.96* 2.98*   HGB 8.5* 8.8* 8.9*   HCT 27.3* 28.3* 28.1*    359* 365*   MCV 96 96 94   MCH 29.8 29.7 29.9   MCHC 31.1* 31.1* 31.7*     BNP:  No results for input(s): BNP in the last 168 hours.    Invalid input(s): BNPTRIAGELBLO  CMP:    Recent Labs  Lab 05/05/18  0600 05/06/18  0600 05/07/18  0600   * 385* 225*   CALCIUM 8.8 9.0 9.4   ALBUMIN 2.3* 2.2* 2.3*   PROT 6.2 6.4 6.2   * 129* 131*   K 4.4 5.0 4.8   CO2 24 22* 23   CL 99 94* 95   BUN 22* 40* 55*   CREATININE 2.2* 3.0* 3.5*   ALKPHOS 174* 166* 170*   ALT 10 9* 11   AST 15 21 13   BILITOT 0.5 0.4  0.4      Coagulation:   No results for input(s): PT, INR, APTT in the last 168 hours.  LDH:  No results for input(s): LDH in the last 72 hours.  Microbiology:  Microbiology Results (last 7 days)     Procedure Component Value Units Date/Time    Blood culture [[id number]] Collected:  05/05/18 0644    Order Status:  Completed Specimen:  Blood from Midline, Basilic, Right Updated:  05/07/18 1012     Blood Culture, Routine No Growth to date     Blood Culture, Routine No Growth to date     Blood Culture, Routine No Growth to date    Blood culture [[id number]] Collected:  05/05/18 0812    Order Status:  Completed Specimen:  Blood Updated:  05/07/18 1012     Blood Culture, Routine No Growth to date     Blood Culture, Routine No Growth to date     Blood Culture, Routine No Growth to date    Narrative:       Collection has been rescheduled by CDP at 5/5/2018 07:40 Reason: Due   now  Collection has been rescheduled by CDP at 5/5/2018 07:40 Reason: Due   now    Culture, Respiratory with Gram Stain [[id number]]     Order Status:  No result Specimen:  Respiratory     Respiratory Viral Panel by PCR [facility]; Nasal Swab [[id number]] Collected:  05/05/18 0644    Order Status:  Sent Specimen:  Respiratory Updated:  05/05/18 0802    Blood culture [[id number]]     Order Status:  Canceled Specimen:  Blood     Culture, Respiratory with Gram Stain [[id number]]     Order Status:  No result Specimen:  Respiratory from Tracheal Aspirate     Fungus culture [[id number]] Collected:  03/28/18 1607    Order Status:  Completed Specimen:  Body Fluid from Lung, RLL Updated:  05/01/18 1347     Fungus (Mycology) Culture No fungus isolated after 4 weeks          I have reviewed all pertinent labs within the past 24 hours.    Estimated Creatinine Clearance: 25.2 mL/min (A) (based on SCr of 3.5 mg/dL (H)).    Diagnostic Results:  I have reviewed all pertinent imaging results/findings within the past 24 hours.    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 Most recently has had ongoing issues with his lungs, had gotten 2 thoracenteses, after which he underwent VATS 01/19/18 followed by pleurex catheter placement and effusion drainage 01/11/18, subsequently had it removed 02/07/18 after drainage had decreased despite multiple attempts to drain it. Has been having significant coughing fits that result in him being near-syncopal at times, although difficult to say if he has passed out or not- patient most recently was admitted to the hospital for increased AGUILAR, coughing and pre-syncope 02/11-02/14/18 at [facility].   Patient was started on antibiotics for suspected bacterial infection, with some diarrhea, bronchitis, and possible pneumonia. Ct chest showed some pleural fluid collection and after talking with Dr. James, we arranged for him to receive a diagnostic tap with IR, from which the fluid collection demonstrated no growth or significant findings at all really. Cell counts do not appear to have been sent but will recheck. Patient states since his hospital stay, yesterday had a significant coughing fit again, after which he nearly passed out, is being seen in clinic today for this. Denies any cardiopulmonary complaints, no leg swelling, has some abdominal swelling, which is moderate for him. Denies significant shortness of breath with mild exertion, had 6MWT yesterday to see if he qualified for home O2, and his O2 sats actually improved after recovery from where he started initially. He and his wife admit he is in bed most days, not very active.     Mr. Crocker presented to the ED 3/11/18 with approximately 3 weeks of worsening diarrhea and 2-3 days of sinus pressure, drainage, and worsened cough.  He notes that he had a coughing fit last night and passed out, coughed  throughout most of the night.  This also happened on 2/25/18.  He last saw Dr Ferreira in clinic on 2/20/18 where he was taken off myfortic and switched to cellcept (he hadn't been on cellcept because of leukopenia when they tried post-transplant).  Though his diarrhea had improved after his last discharge, he states it has increased now to 15x/day, clear/yellow/green, no fevers, no exacerbating foods per say.  He was taken back off the cellcept and placed back on myfortic this past week and has not noticed immediate change in diarrhea. He also reports his baseline coughing fits havent improved and are minimally responsive to tessalon pearls.  Came on last night, he lost consciousness during a fit once which is relatively normal for him, and had two more during HPI.  He notes no sick contacts but does state he's had some URI symptoms as above.  His baseline vitals are usually -120 and BP 90s/60s-110s/70s.  He reports a history of intermittent diarrhea - did have Cdiff many years ago but this smells different.  No abdominal pain, no nausea, no vomiting.  No blood in diarrhea.  Appears last c-scope in 2015 with no evidence of infection or inflammatory processes.  He does report IBS which is different that this.       * Acute respiratory failure with hypoxia    - Resolved. S/P Bronch and intubation 3/14 now post tracheostomy due to inability to extubate  - Etiology = RSV   - Pulmonology signed off. Completely weaned off vent.   - Appreciate PT/OT/Wound care.  -Discuss with Gen Surg the possibility of removing trach        Fever    -No fever for over 48 hours  -CXR- no new process. Ordered IS  - Resp Viral panel pending, Bl Cultures NGTD  -Unable to collect sputum cx  -Will start Abx if he has another fever         Heart transplanted    - TTE 4/24/18 showed normal graft function but has known history of restrictive CM post transplant.  - Continue Prednisone 5mg daily and  Tacrolimus 5/5 with plan to adjust as needed  for goal 6-10. Resumed low dose cellcept         Abdominal distention    -h/o paracenteses every moght prior to admission and prior to being on HD  -Has not had a BM in a few days. Add Colace to Miralax today  -Abd U/S to look for ascites and will consider paracentesis        Alteration in skin integrity    - wound care following        Restrictive cardiomyopathy    - No changes (see above)         Type 1 diabetes mellitus with stage 3 chronic kidney disease    - Appreciate Endocrine's recs  - Insulin gtt per endocrine recs. Adjust as needed  - per endocrine please call when he is off tube feeds to adjust ggt and transition to rehab/outpatient regimen        Hypothyroidism due to Hashimoto's thyroiditis    - Appreciate Endocrine's recs  - Switched to PO Levothyroxine 75mcg daily        Acute renal failure with acute tubular necrosis superimposed on stage 3 chronic kidney disease    - Appreciate Nephrology recs.   - Continue midodrine  - HD per nephrology SAVANNA ZARATE         Anemia    - Last transfusion 5/4/18  - Nephrology has resumed ProCrit         Debility    - PT/OT/SLP daily. Recommending rehab.  - Nutrition following.   - Passed MBSS with speech. Started regular diet with thin liquids, but will continue tube feeds nocturnal until eating 75% of meals   -Rehab here may be able to take him with HD and trach- will discuss with their team today        Anxiety    - Increased Lexapro to 20mg daily  - Continue seroquel 50 QHS   - Continue .5mg xanax PRN Q8H   - Appreciate psychiatry input.             Corinne Presley PA-C  Heart Transplant  [facility]

## 2018-05-07 NOTE — PLAN OF CARE
Consult received to evaluate Pt for decannulation. Chart reviewed. Full note with assessment and plan to follow tomorrow morning.   Jay García MD  Fellow, Pulmonary & Critical Care Medicine  spectralink [phone]

## 2018-05-07 NOTE — NURSING
Rounds Report: Attended interdisciplinary rounds this afternoon with the transplant team including SW, physicians, fellows,  mid-level providers, and transplant coordinators.  Discussed plan of care but don't have DC date as there is still the problem with hemodialysis and his trach. The team is looking into weaning the trach.

## 2018-05-07 NOTE — ASSESSMENT & PLAN NOTE
-No fever for over 48 hours  -CXR- no new process. Ordered IS  - Resp Viral panel pending, Bl Cultures NGTD  -Unable to collect sputum cx  -Will start Abx if he has another fever

## 2018-05-07 NOTE — PROGRESS NOTES
Dialysis complete.  Blood returned.    Left chest wall  CVC flushed * 2 with    Saline and heparin    Locked with cap and tape.  No s/s infection at cvc site.   Pt tolerated hemodialysis without diff.  Pt ran   3.5 Hrs on hemodialysis machine.   Took off   2000 Ml net volume.      Used F-160 dialyzer.

## 2018-05-07 NOTE — SIGNIFICANT EVENT
Notified Dr. Johnson of patient's capillary blood glucose = 133 mg/dL. Insulin gtt had been increased to 1.3 Units/hr at 2000 hrs with initiation of nocturnal tube feeds (Novasource @ 40 cc/hr), then decreased to 0.4 Units/hr per nomogram for blood sugar < 140 mg/dL. MD was notified because that nomogram was for day time (no tube feeds) insulin adjustments. Orders received: increase insulin gtt back up to 1.3 Units/hr. New insulin adjustment nomogram (for nocturnal TF) to be entered by MD for next capillary blood glucose check at 0200 hrs.

## 2018-05-07 NOTE — PT/OT/SLP PROGRESS
"Physical Therapy Treatment    Patient Name:  Lv Crocker   MRN:  [id number]    Recommendations:     Discharge Recommendations:  rehabilitation facility   Discharge Equipment Recommendations:     Barriers to discharge: Inaccessible home and Decreased caregiver support    Assessment:     Lv Crocker is a 42 y.o. male admitted with a medical diagnosis of Acute respiratory failure with hypoxia.  He presents with the following impairments/functional limitations:  weakness, impaired functional mobilty, gait instability, impaired endurance, impaired balance, impaired self care skills, decreased lower extremity function, decreased ROM, impaired skin, impaired muscle length, pain, impaired cardiopulmonary response to activity, decreased upper extremity function.  Pt tolerated treatment session c mod c/o fatigue.  Pt refused OOB activities today d/t fatigue following dialysis.  Pt demo improved BLE strength but still lacking strength for functional improvements c transfer/gait.  Pt encouraged to sit EOB or up in cardiac chair and performing BLE exercises throughout day.  Pt would benefit from continued skilled acute PT 5x/wk to improve functional mobility.      Rehab Prognosis:  fair; patient would benefit from acute skilled PT services to address these deficits and reach maximum level of function.      Recent Surgery: Procedure(s) (LRB):  INSERTION-CATHETER-PERM-A-CATH (Left)  INSERTION-TUBE-GASTROSTOMY (N/A) 33 Days Post-Op    Plan:     During this hospitalization, patient to be seen 5 x/week to address the above listed problems via gait training, therapeutic activities, therapeutic exercises, neuromuscular re-education  · Plan of Care Expires:  06/01/18   Plan of Care Reviewed with: patient    Subjective     Communicated with RN and OT prior to session.  Patient found HOB elevated upon PT entry to room, agreeable to treatment.      Chief Complaint: fatigue  Patient comments/goals: "I willing to do " "whatever you want to do but in bed."  Pain/Comfort:  · Pain Rating 1: 0/10    Patients cultural, spiritual, Scientology conflicts given the current situation: none    Objective:     Patient found with: PEG Tube, peripheral IV, tracheostomy, pressure relief boots     General Precautions: Standard, fall   Orthopedic Precautions:N/A   Braces: N/A     Functional Mobility:  · Bed Mobility:  Pt refused OOB activities d/t fatigue      AM-PAC 6 CLICK MOBILITY  Turning over in bed (including adjusting bedclothes, sheets and blankets)?: 2  Sitting down on and standing up from a chair with arms (e.g., wheelchair, bedside commode, etc.): 2  Moving from lying on back to sitting on the side of the bed?: 2  Moving to and from a bed to a chair (including a wheelchair)?: 2  Need to walk in hospital room?: 2  Climbing 3-5 steps with a railing?: 1  Total Score: 11       Therapeutic Activities and Exercises:  Educated on importance of OOB activity; performing BLE exercises - v/u  Therex (AP, SAQ, SKTC, assisted SLR) 1x10 ea; B calf stretch [id number] ea    Patient left HOB elevated with all lines intact, call button in reach, RN notified and family present..    GOALS:    Physical Therapy Goals        Problem: Physical Therapy Goal    Goal Priority Disciplines Outcome Goal Variances Interventions   Physical Therapy Goal     PT/OT, PT Ongoing (interventions implemented as appropriate)     Description:  Goals to be met by: 18     Patient will increase functional independence with mobility by performin. Supine to sit with Moderate Assistance.  2. Sit to supine with Moderate Assistance.  3. Rolling to Left and Right with Minimal Assistance.   4. Sit to stand transfer with Moderate Assistance.  5. Bed to chair transfer with Maximum Assistance.  6. Gait  x 5 feet with Minimal Assistance.   7.  Pt to perf and be compliant with LE HEP to improve vascularity and mm strength.                            Time Tracking:     PT Received On: " 05/07/18  PT Start Time: 1425     PT Stop Time: 1449  PT Total Time (min): 24 min     Billable Minutes: Therapeutic Exercise 15 mins Co-Treat c OT    Treatment Type: Treatment  PT/PTA: PT     PTA Visit Number: 0     Corey Hernández, PT  05/07/2018

## 2018-05-07 NOTE — PT/OT/SLP PROGRESS
"Occupational Therapy   Treatment    Name: Lv Crocker  MRN: [id number]  Admitting Diagnosis:  Acute respiratory failure with hypoxia  33 Days Post-Op    Recommendations:     Discharge Recommendations: rehabilitation facility  Discharge Equipment Recommendations:   (tbd)  Barriers to discharge:  Inaccessible home environment, Decreased caregiver support    Subjective     Communicated with: nsg prior to session. Cc with PT  Pain/Comfort:  · Pain Rating 1: 0/10    Patients cultural, spiritual, Taoism conflicts given the current situation: none    Objective:     Patient found with: PEG Tube, peripheral IV, tracheostomy    General Precautions: Standard, fall   Orthopedic Precautions:N/A   Braces:       Occupational Performance:    Bed Mobility:    · Pt declined, reports he's fatigued and "worn out" from dialysis and with n/v overnight, agrees to in bed exercises/activity     Functional Mobility/Transfers:  · declined  · Functional Mobility: declined    Activities of Daily Living:  · Able to bring hand to face without assist, wipe face with min assist with LUE     Patient left supine with all lines intact and family present    AMPA 6 Click:  AMPA Total Score: 8    Treatment & Education:  Pt performed BUE hand grasp/release, RUE: light manual resistance for elbow flexion/extension, AAROM shoulder for presses/abduction- all x 10 reps; LUE: manual resistance elbow flexion/extension/ shoulder presses x 10 reps, hand to face x 10 reps, gentle ROM/stretches in abduction and external rotation to tolerance   Education:    Assessment:     Lv Crocker is a 42 y.o. male with a medical diagnosis of Acute respiratory failure with hypoxia.  He presents with fatigue but agrees to participate with exercises.  Performance deficits affecting function are weakness, impaired endurance, impaired self care skills, impaired functional mobilty, gait instability, impaired balance, decreased coordination, decreased lower " extremity function, decreased upper extremity function, pain, decreased ROM, impaired cardiopulmonary response to activity.      Rehab Prognosis:  good; patient would benefit from acute skilled OT services to address these deficits and reach maximum level of function.       Plan:     Patient to be seen 5 x/week to address the above listed problems via self-care/home management, therapeutic activities, therapeutic exercises, neuromuscular re-education  · Plan of Care Expires: 05/31/18  · Plan of Care Reviewed with: patient    This Plan of care has been discussed with the patient who was involved in its development and understands and is in agreement with the identified goals and treatment plan    GOALS:    Occupational Therapy Goals        Problem: Occupational Therapy Goal    Goal Priority Disciplines Outcome Interventions   Occupational Therapy Goal     OT, PT/OT Ongoing (interventions implemented as appropriate)    Description:  Goals to be met by: 5/8/18    Pt will follow 75% of motor commands with <2 verbal cues for repetition of task to maximize engagement in grooming task.--MET  Pt will complete feeding with mod A.   Pt will complete supine with HOB slightly elevated to seated at EOB with mod A in prep for seated grooming task.  Pt will engage in BUE exercises in orders to increase strength to 3+/5 in BUE's to increase engagement in seated grooming task  Pt will sit at EOB for approx 10 minutes with mod A and unilateral UE support to complete grooming task  Pt will complete sit>stand from EOB with bed in lowest position with mod A in prep for transfer to [facility]                       Time Tracking:     OT Date of Treatment: 05/07/18  OT Start Time: 1432  OT Stop Time: 1448  OT Total Time (min): 16 min    Billable Minutes:Therapeutic Exercise 16 min    Delia To OT  5/7/2018

## 2018-05-07 NOTE — SUBJECTIVE & OBJECTIVE
Interval History: C/o abd distension, worsening over past few days and now feeling full when he eats. Still c/o cough.     Continuous Infusions:   insulin (HUMAN R) infusion (adults) 0.6 Units/hr (05/07/18 0718)     Scheduled Meds:   acetaminophen  650 mg Oral TID    albumin human 25%  25 g Intravenous Once    cetirizine  5 mg Oral Daily    docusate sodium  100 mg Oral BID    epoetin jose miguel (PROCRIT) injection  100 Units/kg (Dosing Weight) Intravenous Every Mon, Wed, Fri    escitalopram oxalate  20 mg Oral Daily    famotidine  20 mg Oral QHS    guaiFENesin  1,200 mg Oral BID    guaiFENesin  600 mg Oral Once    heparin (porcine)  5,000 Units Subcutaneous Q12H    insulin aspart U-100  3 Units Subcutaneous TIDWM    levothyroxine  137 mcg Oral Before breakfast    midodrine  10 mg Oral TID    mycophenolate  250 mg Oral BID    polyethylene glycol  17 g Oral BID    predniSONE  5 mg Oral Daily    QUEtiapine  50 mg Oral QHS    tacrolimus  5 mg Oral BID     PRN Meds:sodium chloride, sodium chloride 0.9%, albuterol-ipratropium 2.5mg-0.5mg/3mL, ALPRAZolam, benzonatate, calcium carbonate, dextrose 50%, dextrose 50%, glucagon (human recombinant), glucose, glucose, heparin (porcine), insulin aspart U-100, midodrine, ondansetron, oxyCODONE    Review of patient's allergies indicates:   Allergen Reactions    No known drug allergies      Objective:     Vital Signs (Most Recent):  Temp: 98.4 °F (36.9 °C) (05/07/18 1212)  Pulse: (!) 120 (05/07/18 1212)  Resp: 16 (05/07/18 1212)  BP: 113/64 (05/07/18 1212)  SpO2: (!) 94 % (05/07/18 1212) Vital Signs (24h Range):  Temp:  [98.1 °F (36.7 °C)-98.5 °F (36.9 °C)] 98.4 °F (36.9 °C)  Pulse:  [] 120  Resp:  [16-20] 16  SpO2:  [88 %-97 %] 94 %  BP: (104-137)/(64-82) 113/64     Patient Vitals for the past 72 hrs (Last 3 readings):   Weight   05/07/18 0400 77.4 kg (170 lb 10.2 oz)   05/05/18 0615 76.4 kg (168 lb 6.9 oz)     Body mass index is 26.73 kg/m².      Intake/Output  Summary (Last 24 hours) at 05/07/18 1511  Last data filed at 05/07/18 1130   Gross per 24 hour   Intake          1091.65 ml   Output             2600 ml   Net         -1508.35 ml       Hemodynamic Parameters:           Physical Exam   Constitutional: He is oriented to person, place, and time. He appears well-developed and well-nourished.   Neck: Normal range of motion. Neck supple. No JVD present.   Cardiovascular: Normal rate and regular rhythm.  Exam reveals no gallop and no friction rub.    No murmur heard.  Pulmonary/Chest: Effort normal and breath sounds normal. He has no wheezes. He has no rales.   Trach in place   Abdominal: Soft. Bowel sounds are normal. He exhibits distension. There is no tenderness.   Musculoskeletal: He exhibits no edema.   Neurological: He is alert and oriented to person, place, and time.   Skin: Skin is warm and dry.   RLE- 1st and 3rd gangrenous toes       Significant Labs:  CBC:    Recent Labs  Lab 05/05/18  0600 05/06/18  0600 05/07/18  0600   WBC 5.23 5.86 6.65   RBC 2.85* 2.96* 2.98*   HGB 8.5* 8.8* 8.9*   HCT 27.3* 28.3* 28.1*    359* 365*   MCV 96 96 94   MCH 29.8 29.7 29.9   MCHC 31.1* 31.1* 31.7*     BNP:  No results for input(s): BNP in the last 168 hours.    Invalid input(s): BNPTRIAGELBLO  CMP:    Recent Labs  Lab 05/05/18  0600 05/06/18  0600 05/07/18  0600   * 385* 225*   CALCIUM 8.8 9.0 9.4   ALBUMIN 2.3* 2.2* 2.3*   PROT 6.2 6.4 6.2   * 129* 131*   K 4.4 5.0 4.8   CO2 24 22* 23   CL 99 94* 95   BUN 22* 40* 55*   CREATININE 2.2* 3.0* 3.5*   ALKPHOS 174* 166* 170*   ALT 10 9* 11   AST 15 21 13   BILITOT 0.5 0.4 0.4      Coagulation:   No results for input(s): PT, INR, APTT in the last 168 hours.  LDH:  No results for input(s): LDH in the last 72 hours.  Microbiology:  Microbiology Results (last 7 days)     Procedure Component Value Units Date/Time    Blood culture [[id number]] Collected:  05/05/18 0644    Order Status:  Completed Specimen:  Blood from  Midline, Basilic, Right Updated:  05/07/18 1012     Blood Culture, Routine No Growth to date     Blood Culture, Routine No Growth to date     Blood Culture, Routine No Growth to date    Blood culture [[id number]] Collected:  05/05/18 0812    Order Status:  Completed Specimen:  Blood Updated:  05/07/18 1012     Blood Culture, Routine No Growth to date     Blood Culture, Routine No Growth to date     Blood Culture, Routine No Growth to date    Narrative:       Collection has been rescheduled by CDP at 5/5/2018 07:40 Reason: Due   now  Collection has been rescheduled by CDP at 5/5/2018 07:40 Reason: Due   now    Culture, Respiratory with Gram Stain [[id number]]     Order Status:  No result Specimen:  Respiratory     Respiratory Viral Panel by PCR Migelner; Nasal Swab [[id number]] Collected:  05/05/18 0644    Order Status:  Sent Specimen:  Respiratory Updated:  05/05/18 0802    Blood culture [[id number]]     Order Status:  Canceled Specimen:  Blood     Culture, Respiratory with Gram Stain [[id number]]     Order Status:  No result Specimen:  Respiratory from Tracheal Aspirate     Fungus culture [[id number]] Collected:  03/28/18 1607    Order Status:  Completed Specimen:  Body Fluid from Lung, RLL Updated:  05/01/18 1347     Fungus (Mycology) Culture No fungus isolated after 4 weeks          I have reviewed all pertinent labs within the past 24 hours.    Estimated Creatinine Clearance: 25.2 mL/min (A) (based on SCr of 3.5 mg/dL (H)).    Diagnostic Results:  I have reviewed all pertinent imaging results/findings within the past 24 hours.

## 2018-05-08 NOTE — PROGRESS NOTES
[facility]  Nephrology  Progress Note    Patient Name: Lv Crocker  MRN: [id number]  Admission Date: 3/11/2018  Hospital Length of Stay: 57 days  Attending Provider: Jose A Lloyd MD   Primary Care Physician: Philippe Mohr MD  Principal Problem:Acute respiratory failure with hypoxia    Subjective:     HPI: Mr. Crocker is a 41 yo WM with T1DM, Hashimoto thyroiditis, h/o OHTx 11/2014, and CKD stage 4 who was admitted on 3/11/18 for persistent diarrhea. He reported a 3 week history of diarrhea up to 15x/day. Associated symptoms including URI symptoms, coughing fits, and coughing syncope. Prograf level was 14.3. His post-heart transplant course has been complicated by AMR 4/2015, CMV, post-transplant restrictive cardiomyopathy, recurrent pleural effusions/ascites requiring monthly thoracenteses/paracenteses, VATS 1/11/18 with Pleur-X catheter (now removed), and CKD stage 4 with baseline sCr 2.6-3.0. Baseline -120s and baseline BP 90s-110s/60-70s. Upon admission he was started on IVF and diarrhea workup was initiated. On 3/12 he was noted to have decreased UOP despite fluids; NS was increased to 100cc/hr. He was scheduled for a colonoscopy on 3/13 however procedure was cancelled due to hypoxia and fever. He was transferred to the ICU for closer monitoring. He continued to have oliguria overnight. Bladder scan revealed 200cc of urine but patient refused osullivan catheter placement. Wife reports that patient always has a hard time urinating again after osullivan catheters are placed/removed so he refuses them. He remained hypoxic despite FiO2 70% and ABG revealed combined respiratory and metabolic acidosis with pH 7.17. He was started on BiPAP as well as a bicarb infusion at 50cc/hr. ABG with mild improvement. AM labs revealed K 6.2 and he was shifted and given kayexalate.Trialysis catheter was placed this morning and he subsequently developed hypotension and was started on levophed. He was  intubated later this morning. Nephrology consulted for CACHORRO. All history obtained by primary team and chart review as patient was intubated/sedated on exam. Consent for dialysis obtained by patient's wife and placed in chart. Of note, both of patient's parents were on dialysis.     Interval History: 4 Episode of emesis after kaexaylate yesterday. No further NV. Eating grits this am. Still with TF at nigh 8-8am. US showed mod ascites, largest pocket RLQ. Net neg 942 ml. He is on regular diet.   Review of patient's allergies indicates:   Allergen Reactions    No known drug allergies      Current Facility-Administered Medications   Medication Frequency    0.9%  NaCl infusion (for blood administration) Q24H PRN    0.9%  NaCl infusion PRN    acetaminophen tablet 650 mg TID    albumin human 25% bottle 25 g Once    albuterol-ipratropium 2.5mg-0.5mg/3mL nebulizer solution 3 mL Q4H PRN    ALPRAZolam tablet 0.5 mg Q6H PRN    benzonatate capsule 100 mg TID PRN    calcium carbonate 200 mg calcium (500 mg) chewable tablet 500 mg BID PRN    cetirizine tablet 5 mg Daily    dextrose 50% injection 12.5 g PRN    dextrose 50% injection 25 g PRN    docusate sodium capsule 100 mg BID    epoetin jose miguel injection 8,100 Units Every Mon, Wed, Fri    escitalopram oxalate tablet 20 mg Daily    famotidine tablet 20 mg QHS    glucagon (human recombinant) injection 1 mg PRN    glucose chewable tablet 16 g PRN    glucose chewable tablet 24 g PRN    guaiFENesin 12 hr tablet 1,200 mg BID    heparin (porcine) injection 1,000 Units PRN    heparin (porcine) injection 5,000 Units Q12H    insulin aspart U-100 pen 0-5 Units QID (AC + HS) PRN    insulin aspart U-100 pen 3 Units TIDWM    insulin regular (Humulin R) 100 Units in sodium chloride 0.9% 100 mL infusion Continuous    levothyroxine tablet 137 mcg Before breakfast    midodrine tablet 10 mg TID    midodrine tablet 15 mg PRN    mycophenolate capsule 250 mg BID     ondansetron disintegrating tablet 4 mg Q6H PRN    oxyCODONE immediate release tablet 5 mg Q6H PRN    polyethylene glycol packet 17 g BID    potassium, sodium phosphates 280-160-250 mg packet 2 packet Q4H    predniSONE tablet 5 mg Daily    QUEtiapine tablet 50 mg QHS    tacrolimus capsule 5 mg BID       Objective:     Vital Signs (Most Recent):  Temp: 98 °F (36.7 °C) (05/08/18 0800)  Pulse: (!) 125 (05/08/18 0920)  Resp: 18 (05/08/18 0800)  BP: 119/60 (05/08/18 0800)  SpO2: 96 % (05/08/18 0800)  O2 Device (Oxygen Therapy): room air (05/08/18 0800) Vital Signs (24h Range):  Temp:  [97.6 °F (36.4 °C)-98.9 °F (37.2 °C)] 98 °F (36.7 °C)  Pulse:  [110-125] 125  Resp:  [16-20] 18  SpO2:  [90 %-96 %] 96 %  BP: (104-120)/(60-72) 119/60     Weight: 77.4 kg (170 lb 10.2 oz) (05/07/18 0400)  Body mass index is 26.73 kg/m².  Body surface area is 1.91 meters squared.    I/O last 3 completed shifts:  In: 2268.7 [P.O.:1080; I.V.:28.7; Other:600; NG/GT:560]  Out: 2600 [Other:2600]    Physical Exam   Constitutional: He appears well-developed. No distress.   Trach   HENT:   Head: Normocephalic and atraumatic.   Eyes: Conjunctivae and EOM are normal.   Cardiovascular: Regular rhythm.  Tachycardia present.    Pulmonary/Chest: He has no wheezes. He has no rales.   L IJ TDC   Abdominal: Soft. He exhibits no distension.   LUQ PEG  ascites   Neurological: He is alert.   Skin:   Right necrotic toes 1-3  Left great toe necrotic-same       Significant Labs:  ABGs: No results for input(s): PH, PCO2, HCO3, POCSATURATED, BE in the last 168 hours.  All labs within the past 24 hours have been reviewed.     Significant Imaging:  Labs: Reviewed    Assessment/Plan:     Acute renal failure with acute tubular necrosis superimposed on stage 3 chronic kidney disease    - baseline sCr 2.6-3.0 consistent with CKD stage IV  - anuric CACHORRO likely ischemic ATN from prolonged pre-renal state (diarrhea) in setting of prograf renal vasoconstriction; cannot  r/o septic ATN or Prograf toxicity  - SLED started 3/14. TDC placed 4/4/18.   - remains anuric  -Per [facility] Rehab, patient approve for [facility] inpatient rehab (opens May 1) when off insulin gtt. SW to assisting to outpatient HD that will take patient with Trach.     -Continue epo  -Remains anuric  -Tolerated HD yesterday with 2 L removed.   - Plan for 2-3 L removal tomorrow with HD. Continue HD MWF-adjust Na bath accordingly.     Ascites  -Reports PTA paracentesis every 1-2 months with 2-3 L removal each time. He as been worked with by Hepatology Dr. Zuluaga 2 years ago and was told no liver disease.   -US mod ascites, largest RLQ. Discussed with primary team Recommend paracentesis on non HD days if paracentesis indicated.      Anemia of CKD  -Iron 70, T sat 35, TIBC 225 and ferritan 1287  -Continue epo. Increased epo 5/4   -PRBC transfusion       BMD  Lab Results   Component Value Date    PTH 23.0 04/19/2018    CALCIUM 8.9 05/08/2018    CAION 0.98 (L) 03/17/2018    PHOS 2.4 (L) 05/08/2018   -Okay regular diet for now. Neutra phos ordered.               Thank you for your consult. I will follow-up with patient. Please contact us if you have any additional questions.    Amanda Cuellar NP  Nephrology  [facility]

## 2018-05-08 NOTE — ASSESSMENT & PLAN NOTE
Estimated Creatinine Clearance: 36.7 mL/min (A) (based on SCr of 2.4 mg/dL (H)).  Avoid insulin stacking

## 2018-05-08 NOTE — PLAN OF CARE
Problem: Physical Therapy Goal  Goal: Physical Therapy Goal  Goals to be met by: 18     Patient will increase functional independence with mobility by performin. Supine to sit with Moderate Assistance.  2. Sit to supine with Moderate Assistance.  3. Rolling to Left and Right with Minimal Assistance.   4. Sit to stand transfer with Moderate Assistance.  5. Bed to chair transfer with Maximum Assistance.  6. Gait  x 5 feet with Minimal Assistance.   7.  Pt to perf and be compliant with LE HEP to improve vascularity and mm strength.           Outcome: Ongoing (interventions implemented as appropriate)  Goals reviewed and remain appropriate. Pt progressing towards goals.    Petra Lake, PT, DPT   2018  [phone]

## 2018-05-08 NOTE — SUBJECTIVE & OBJECTIVE
Interval History 5/8/2018:  Patient is seen for follow-up rehab evaluation and recommendations: No acute events over night.  Pulmonology consulted for possible de-cannulation.  Participated with therapy today, refused yesterday.  Barriers for discharge/rehab admission: not medically ready for discharge     HPI, Past Medical, Family, and Social History remains the same as documented in the initial encounter.    Scheduled Medications:    acetaminophen  650 mg Oral TID    albumin human 25%  25 g Intravenous Once    cetirizine  5 mg Oral Daily    docusate sodium  100 mg Oral BID    epoetin jose miguel (PROCRIT) injection  100 Units/kg (Dosing Weight) Intravenous Every Mon, Wed, Fri    escitalopram oxalate  20 mg Oral Daily    famotidine  20 mg Oral QHS    guaiFENesin  1,200 mg Oral BID    heparin (porcine)  5,000 Units Subcutaneous Q12H    insulin aspart U-100  3 Units Subcutaneous TIDWM    levothyroxine  137 mcg Oral Before breakfast    midodrine  10 mg Oral TID    mycophenolate  250 mg Oral BID    polyethylene glycol  17 g Oral BID    potassium, sodium phosphates  2 packet Oral Q4H    predniSONE  5 mg Oral Daily    QUEtiapine  50 mg Oral QHS    tacrolimus  5 mg Oral BID     PRN Medications: sodium chloride, sodium chloride 0.9%, albuterol-ipratropium 2.5mg-0.5mg/3mL, ALPRAZolam, benzonatate, calcium carbonate, dextrose 50%, dextrose 50%, glucagon (human recombinant), glucose, glucose, heparin (porcine), insulin aspart U-100, midodrine, ondansetron, oxyCODONE    Review of Systems   Constitutional: Positive for activity change. Negative for chills, fatigue and fever.   HENT: Negative for drooling, hearing loss, trouble swallowing and voice change.    Eyes: Negative for pain and visual disturbance.   Respiratory: Negative for cough and wheezing.         + trach   Cardiovascular: Negative for chest pain and palpitations.   Gastrointestinal: Negative for abdominal pain, nausea and vomiting.        + PEG,  positive acid reflux.    Genitourinary: Negative for difficulty urinating and flank pain.   Musculoskeletal: Positive for arthralgias, gait problem and myalgias. Negative for back pain and neck pain.   Skin: Positive for color change and wound. Negative for rash.   Allergic/Immunologic: Positive for immunocompromised state.   Neurological: Positive for weakness. Negative for dizziness, numbness and headaches.   Psychiatric/Behavioral: Positive for sleep disturbance (improving). Negative for agitation and hallucinations. The patient is not nervous/anxious.      Objective:     Vital Signs (Most Recent):  Temp: 98 °F (36.7 °C) (05/08/18 0800)  Pulse: (!) 127 (05/08/18 1011)  Resp: 18 (05/08/18 1011)  BP: 119/60 (05/08/18 0800)  SpO2: 99 % (05/08/18 1011)    Vital Signs (24h Range):  Temp:  [97.6 °F (36.4 °C)-98.9 °F (37.2 °C)] 98 °F (36.7 °C)  Pulse:  [110-127] 127  Resp:  [16-20] 18  SpO2:  [90 %-99 %] 99 %  BP: (105-119)/(60-72) 119/60     Physical Exam   Constitutional: He is oriented to person, place, and time. He appears well-developed. No distress.   HENT:   Head: Normocephalic and atraumatic.   Right Ear: External ear normal.   Left Ear: External ear normal.   Nose: Nose normal.   Eyes: Right eye exhibits no discharge. Left eye exhibits no discharge. No scleral icterus.   Neck: Neck supple.   Trach intact.    Cardiovascular: Normal rate, regular rhythm and intact distal pulses.    Pulmonary/Chest: Effort normal. No respiratory distress. He has no wheezes.   Abdominal: Soft. He exhibits no distension. There is no tenderness.   PEG intact, incision LETICIA, CDI   Musculoskeletal: He exhibits no edema.        Right shoulder: He exhibits decreased range of motion and tenderness.   R foot: R great toe and R 3rd toe with necrotic tissue  L foot: small amount of necrotic tissue to L great toe  Overall deconditioning   Neurological: He is alert and oriented to person, place, and time.   -  Mental Status:  AAOx3.  Follows  commands.  Answers correct age and .  Recent and remote memory intact.  -  Speech and language:  no aphasia or dysarthria.    -  Motor:  RUE: 3-/5, 3/5 .  LUE: 2+/5, 3/5 .  RLE: Proximal 2/5, distal 3+/5, DF 4-/5, PF 4-/5.  LLE: Proximal 2+/5, distal 4-/5, DF 3-/5, PF 4-/5.  -  Sensory:  Intact to light touch and pin prick.   Skin: Skin is warm and dry. No rash noted.   Psychiatric: His behavior is normal. Thought content normal. His affect is blunt.   Vitals reviewed.    Diagnostic Results:   Labs: Reviewed  X-Ray: Reviewed  US: Reviewed  CT: Reviewed    NEUROLOGICAL EXAMINATION:     MENTAL STATUS   Oriented to person, place, and time.

## 2018-05-08 NOTE — PLAN OF CARE
Problem: SLP Goal  Goal: SLP Goal  Updated Speech Language Pathology Goals  Goals expected to be met by 5/4  1. Pt will tolerate a regular diet and thin liquids with no overt s/s of aspiration.   2. Pt will demonstrate/verbalize 3 safe swallowing strategies with min cues.   3. Educate Pt on aspiration precautions and S/S aspiration  4. Pt and family will verbalize/demosntrate understanding of PMSV precautions and safety awareness with no cues.  5. Patient will complete dysphagia therapy exercises x10 each with min cues.     Speech Language Pathology Goals  Goals expected to be met by 4/27/18  1.  Pt will tolerate PMSV for 30 minutes w/ no change in respiratory status and fair voicing produced. -met  2.  Pt will participate in ongoing swallow assessment. -met  3. Pt will name up to 12 items/minute in a concrete category, 90% of attempts, Supervision, to improve cognitive organization. -discontinued  4. Pt will recall 3 related items post 3 minute filled delay, 90% of attempts, Supervision, to improve STM.-discontinued  5. Pt will answer m/u YNQ with 90% accuracy, Supervision, to improve higher-level comprehension. --discontinued  6. Pt will complete further assessment of reading/writing skills. -discontinued  7. Educate Pt on aspiration precautions and S/S aspiration. -ongoing  8. Educate Pt and family on PMSV precautions and safety awareness. -met; reinforcment to continue  Goals expected to be met by 4/20/18  1.  Pt will tolerate PMSV for 30 minutes w/ no change in respiratory status and fair voicing produced.  2.  Pt will complete further evaluation of cognitive-linguistic communication skills to determine the need for additional goals. Met 4/19  3. Pt will name up to 12 items/minute in a concrete category, 90% of attempts, Supervision, to improve cognitive organization  4. Pt will recall 3 related items post 3 minute filled delay, 90% of attempts, Supervision, to improve STM   5. Pt will answer m/u YNQ with 90%  accuracy, Supervision, to improve higher-level comprehension   6. Pt will complete further assessment of reading/writing skills  7. Pending MD clearance, Pt will complete further assessment of swallow fx   8. Educate Pt and family on PMSV precautions and safety awareness    Goals expected to be met by 4/13/18   1.  Pt will tolerate PMSV for 3 min w/ no change in respiratory status and fair voicing produced. Met   2.  Pt will complete further evaluation of cognitive-linguistic communication skills to determine the need for additional goals.              Outcome: Ongoing (interventions implemented as appropriate)  Pt tolerating PMSV and regular consistency diet well.  Cont ST per POC.    Jeanine Tabares, CCC-SLP  5/8/2018

## 2018-05-08 NOTE — ASSESSMENT & PLAN NOTE
- Abd U/S reviewed.   - Planning for paracentesis this Thursday per IR (non dialysis day)   - May need to discuss peg tube removal as ascites makes risk of infection much higher.

## 2018-05-08 NOTE — ASSESSMENT & PLAN NOTE
BG goal 140-180,       Will need change in infusion rates when TF on:  1.3 units/hr when tube feeds on (8p-8a)  0.6 units/hr when tube feeds off (8a-8p)   novolog 3 units with meals  Low dose correction  POC glucose ac/hs/0200    Paged primary team regarding need for ongoing feeds (see above). Awaiting call back to discuss.    Notify endocrine for changes in nutrition status (diet, TF, TPN)    Pt is T1DM, do not suspend insulin >1 hr   If TF held, decrease insulin rate to 0.5u/hr     Discharge Recommendations:  TBD

## 2018-05-08 NOTE — PT/OT/SLP PROGRESS
Occupational Therapy   Treatment    Name: Lv Crocker  MRN: [id number]  Admitting Diagnosis:  Acute respiratory failure with hypoxia  34 Days Post-Op    Recommendations:     Discharge Recommendations: rehabilitation facility  Discharge Equipment Recommendations:   (TBD at next level of care)  Barriers to discharge:  Inaccessible home environment, Decreased caregiver support    Subjective     Communicated with: RN prior to session. Pt found sitting supported in bed with brother present and arrival of wife during therapy session. MDs present intermittently during session.     Pain/Comfort:  · Pain Rating 1:  (Pt did not state numerical value )  · Location - Side 1: Right  · Location - Orientation 1: medial  · Location 1:  (with knee in flexion )  · Pain Addressed 1: Reposition, Distraction, Cessation of Activity    Patients cultural, spiritual, Yarsani conflicts given the current situation: none stated    Objective:     Patient found with: tracheostomy, telemetry, PEG Tube, central line    General Precautions: Standard, fall   Orthopedic Precautions:RLE weight bearing as tolerated, LLE weight bearing as tolerated   Braces:  ( DARCO shoes )     Occupational Performance:    Bed Mobility:    · Patient completed Scooting/Bridging with maximal assistance for anterior scooting towards EOB and max A x 2 for posterior scoot in medichair.Pt attempted to bridge while supine in bed with L knee flexed and R knee in extension ( increased pain with knee flexion) to pull pants up over hips. Pt unable to clear buttock from bed.    · Patient completed Supine to Sit with maximal assistance for trunk management and bring B LEs towards EOB.     Functional Mobility/Transfers:  · Patient completed x 2 reps Sit <> Stand Transfer from EOB with maximal assistance and of 2 persons  with  no assistive device   · Patient completed Bed <> medichair transfer using Stand Pivot technique with maximal assistance and of 2 persons with no  assistive device    Activities of Daily Living:  · Grooming: maximal assistance: Pt brushed hair in the front using L hand while sitting EOB with therapist providing active assist on posterior portion of tricep for pt to reach head. Pt unable to brush back of head requiring total A from spouse. Min A for dynamic sitting balance.   · UB Dressing: total assistance to don gown like robe sitting EOB  · LB Dressing: total assistance to don B  socks and don/doff B DARCO shoes. Total A to thread underwear while supine. Pt able to reach for underwear at midthigh to continue pulling up towards hips. Pt attempted to bridge while supine in bed order to pull pants up over hips.  Pt required total A for underwear to be pulled up over hips in standing.     Patient left up in medichair with all lines intact, call button in reach, RN notified and wife present    [facility] 6 Click:  [facility] Total Score: 8    Treatment & Education:  Pt educated by therapist on:   - Pt educated on role of OT, POC, and goals for therapy.    - RN notified after therapy session to assist pt with drawsheet transfer back to bed from medichair.   - Patient and family aware of patient's deficits and therapy progression.   - Educated pt on being [facility]  - Time provided for therapeutic counseling and discussion of health disposition.   - Pt and wife educated on the importance of completing a much self-care tasks as (I)'d as possible with assistance as needed to facilitate improved functional (I)  - Pt educated on bridging technique for completion of LB dressing   - Pt completed ADLs and functional mobility for treatment session as noted above   - Pt and wife  verbalized understanding. Pt and wife expressed no further concerns/questions.  - whiteboard updated   Education:    Assessment:     Lv Crocker is a 42 y.o. male with a medical diagnosis of Acute respiratory failure with hypoxia.   He presents with performance deficits affecting function are weakness, impaired endurance, impaired self care skills, impaired functional mobilty, gait instability, impaired balance, decreased upper extremity function, decreased lower extremity function, pain, impaired joint extensibility, impaired coordination, impaired cardiopulmonary response to activity, impaired muscle length, decreased coordination, decreased ROM.  Pt tolerated session well. Pt continues to require increased assistance with all functional mobility and self-care tasks. Pt's static/dynamic sitting balance ranging this date from CGA <> max A. Pt continues to make progress towards all goals and tolerating sitting OOB up in medichair for ~3hrs a day. Pt will continue to benefit from skilled OT in order to improve return to PLOF. Anticipate d/c IP Rehab once pt is medically stable.     Rehab Prognosis:  Good; patient would benefit from acute skilled OT services to address these deficits and reach maximum level of function.       Plan:     Patient to be seen 5 x/week to address the above listed problems via therapeutic activities, self-care/home management, therapeutic exercises, neuromuscular re-education  · Plan of Care Expires: 05/31/18  · Plan of Care Reviewed with: patient, spouse    This Plan of care has been discussed with the patient who was involved in its development and understands and is in agreement with the identified goals and treatment plan    GOALS:    Occupational Therapy Goals        Problem: Occupational Therapy Goal    Goal Priority Disciplines Outcome Interventions   Occupational Therapy Goal     OT, PT/OT Ongoing (interventions implemented as appropriate)    Description:  Goals to be met by: 5/8/18 goal extended to be met by: 5/15/18    Pt will follow 75% of motor commands with <2 verbal cues for repetition of task to maximize engagement in grooming task.--MET  Pt will complete feeding with mod A.   Pt will complete with HOB  slightly elevated to seated at EOB with mod A in prep for seated grooming task.  Pt will engage in BUE exercises in orders to increase strength to 3+/5 in BUE's to increase engagement in seated grooming task  Pt will sit at EOB for approx 10 minutes with mod A and unilateral UE support to complete grooming task  Pt will complete sit>stand from EOB with bed in lowest position with mod A in prep for transfer to [facility]                        Time Tracking:     OT Date of Treatment: 05/08/18  OT Start Time: 0850  OT Stop Time: 0933  OT Total Time (min): 43 min    Billable Minutes:Self Care/Home Management 23  Therapeutic Activity 15    Leonor Wagner, OT  5/8/2018

## 2018-05-08 NOTE — ASSESSMENT & PLAN NOTE
- baseline sCr 2.6-3.0 consistent with CKD stage IV  - anuric CCAHORRO likely ischemic ATN from prolonged pre-renal state (diarrhea) in setting of prograf renal vasoconstriction; cannot r/o septic ATN or Prograf toxicity  - SLED started 3/14. TDC placed 4/4/18.   - remains anuric  -Per Physical Med [facility] Rehab, patient approve for [facility] inpatient rehab (opens May 1) when off insulin gtt. SW to assisting to outpatient HD that will take patient with Trach.     -Continue epo  -Remains anuric  -Tolerated HD yesterday with 2 L removed.   - Plan for 2-3 L removal tomorrow with HD. Continue HD MWF-adjust Na bath accordingly.     Ascites  -Reports PTA paracentesis every 1-2 months with 2-3 L removal each time. He as been worked with by Hepatology Dr. Zuluaga 2 years ago and was told no liver disease.   -US mod ascites, largest RLQ. Discussed with primary team Recommend paracentesis on non HD days if paracentesis indicated.      Anemia of CKD  -Iron 70, T sat 35, TIBC 225 and ferritan 1287  -Continue epo. Increased epo 5/4   -PRBC transfusion       BMD  Lab Results   Component Value Date    PTH 23.0 04/19/2018    CALCIUM 8.9 05/08/2018    CAION 0.98 (L) 03/17/2018    PHOS 2.4 (L) 05/08/2018   -Okay regular diet for now. Neutra phos ordered.

## 2018-05-08 NOTE — SUBJECTIVE & OBJECTIVE
"Interval HPI:   Overnight events: none  Eating:   <25%  Nausea: No  Hypoglycemia and intervention: No  Fever: No  TPN and/or TF: Yes  If yes, type of TF/TPN and rate: novasource renal at 30/hr - 8p-8a    /60 (BP Location: Left arm, Patient Position: Lying)   Pulse (!) 127   Temp 98 °F (36.7 °C) (Oral)   Resp 18   Ht 5' 7" (1.702 m)   Wt 77.4 kg (170 lb 10.2 oz)   SpO2 99%   BMI 26.73 kg/m²     Labs Reviewed and Include      Recent Labs  Lab 05/08/18  0525   *   CALCIUM 8.9   ALBUMIN 2.3*   PROT 6.2   *   K 3.6   CO2 25   CL 97   BUN 30*   CREATININE 2.4*   ALKPHOS 167*   ALT 10   AST 11   BILITOT 0.4     Lab Results   Component Value Date    WBC 5.82 05/08/2018    HGB 8.8 (L) 05/08/2018    HCT 28.6 (L) 05/08/2018    MCV 96 05/08/2018     (H) 05/08/2018       Recent Labs  Lab 05/05/18  0600   TSH 4.793*   FREET4 0.77     Lab Results   Component Value Date    HGBA1C 5.1 04/05/2018       Nutritional status:   Body mass index is 26.73 kg/m².  Lab Results   Component Value Date    ALBUMIN 2.3 (L) 05/08/2018    ALBUMIN 2.3 (L) 05/07/2018    ALBUMIN 2.2 (L) 05/06/2018     Lab Results   Component Value Date    PREALBUMIN 13 (L) 04/08/2018    PREALBUMIN 5 (L) 03/15/2018    PREALBUMIN 18 (L) 03/24/2015       Estimated Creatinine Clearance: 36.7 mL/min (A) (based on SCr of 2.4 mg/dL (H)).    Accu-Checks  Recent Labs      05/06/18   1818  05/06/18   2210  05/07/18   0237  05/07/18   0716  05/07/18   0902  05/07/18   1220  05/07/18   1820  05/07/18   2152  05/08/18   0131  05/08/18   0813   POCTGLUCOSE  107  133*  184*  226*  184*  183*  171*  174*  184*  205*       Current Medications and/or Treatments Impacting Glycemic Control  Immunotherapy:  Immunosuppressants         Stop Route Frequency     tacrolimus capsule 5 mg      -- Oral 2 times daily     mycophenolate capsule 250 mg      -- Oral 2 times daily        Steroids:   Hormones     Start     Stop Route Frequency Ordered    05/01/18 0900  " predniSONE tablet 5 mg      -- Oral Daily 04/30/18 1513        Pressors:    Autonomic Drugs     Start     Stop Route Frequency Ordered    04/30/18 0705  midodrine tablet 15 mg      -- Oral As needed (PRN) 04/30/18 0707    04/25/18 0900  midodrine tablet 10 mg      -- Oral 3 times daily 04/25/18 0650        Hyperglycemia/Diabetes Medications: Antihyperglycemics     Start     Stop Route Frequency Ordered    05/06/18 0815  insulin aspart U-100 pen 3 Units      -- SubQ 3 times daily with meals 05/06/18 0803    04/26/18 2000  insulin aspart U-100 pen 0-5 Units      -- SubQ Before meals & nightly PRN 04/26/18 1904    04/11/18 0015  insulin regular (Humulin R) 100 Units in sodium chloride 0.9% 100 mL infusion     Question:  Insulin Rate Adjustment (DO NOT MODIFY ANSWER)  Answer:  [email or web address]    -- IV Continuous 04/10/18 0204

## 2018-05-08 NOTE — PT/OT/SLP PROGRESS
Physical Therapy Treatment    Patient Name:  Lv Crocker   MRN:  [id number]    Recommendations:     Discharge Recommendations:  rehabilitation facility   Discharge Equipment Recommendations:  (TBD)   Barriers to discharge: Inaccessible home and Decreased caregiver support    Assessment:     Lv Crocker is a 42 y.o. male admitted with a medical diagnosis of Acute respiratory failure with hypoxia.  He presents with the following impairments/functional limitations:  weakness, impaired functional mobilty, impaired balance, impaired endurance, impaired self care skills, gait instability, impaired cardiopulmonary response to activity, impaired skin, decreased ROM, decreased lower extremity function, decreased upper extremity function, decreased coordination, impaired joint extensibility, impaired muscle length. Pt continues to require increased assist to perform bed mobility and transfers. Able to perform stand pivot transfer this date with pt noted to have improved ability to advance BLE during pivot; however, pt continues to require assist of 2 to complete. Decreased postural control noted in standing with pt demo'ing increased forward lean, B knee buckling, and decreased hip ext. Pt would continue to benefit from skilled acute PT in order to address current deficits and progress functional mobility.     Rehab Prognosis:  good; patient would benefit from acute skilled PT services to address these deficits and reach maximum level of function.      Recent Surgery: Procedure(s) (LRB):  INSERTION-CATHETER-PERM-A-CATH (Left)  INSERTION-TUBE-GASTROSTOMY (N/A) 34 Days Post-Op    Plan:     During this hospitalization, patient to be seen 5 x/week to address the above listed problems via gait training, therapeutic activities, therapeutic exercises, neuromuscular re-education  · Plan of Care Expires:  06/01/18   Plan of Care Reviewed with: patient, spouse    Subjective     Communicated with RN prior to session.  " Patient found supine upon PT entry to room, agreeable to treatment.      Chief Complaint: R knee pain with knee flexion   Patient comments/goals: "I got a lot done yesterday." re: yesterday's therapy session   Pain/Comfort:  · Pain Rating 1:  (did not rate)  · Location - Side 1: Right  · Location - Orientation 1: medial  · Location 1: knee (with flexion)  · Pain Addressed 1: Reposition, Distraction, Cessation of Activity    Patients cultural, spiritual, Catholic conflicts given the current situation: none noted     Objective:     Patient found with: tracheostomy, telemetry, PEG Tube, central line     General Precautions: Standard, fall   Orthopedic Precautions:RLE weight bearing as tolerated, LLE weight bearing as tolerated   Braces:  (Darco shoes )     Functional Mobility:  · Bed Mobility:     · Bridging: with maxA to position BLE in hooklying (increased pain with R knee flexion so unable to achieve full hooklying); increased difficulty clearing hips from bed (R>L)  · Supine to Sit: maximal assistance  · Transfers:     · Sit to Stand:  maximal assistance of 2 persons with no AD x2 reps  · Bed to Chair: maximal assistance of 2 persons with  no AD  using  Stand Pivot  · Balance:   · sitting: ranged from CGA-maxA    · standing: maxAx2      AM-PAC 6 CLICK MOBILITY  Turning over in bed (including adjusting bedclothes, sheets and blankets)?: 2  Sitting down on and standing up from a chair with arms (e.g., wheelchair, bedside commode, etc.): 2  Moving from lying on back to sitting on the side of the bed?: 2  Moving to and from a bed to a chair (including a wheelchair)?: 2  Need to walk in hospital room?: 1  Climbing 3-5 steps with a railing?: 1  Total Score: 10       Therapeutic Activities and Exercises:  Pt requiring increased encouragement to perform OOB activity. Pt educated on importance of mobility and OOB activity, but continues to require encouragement and education each session. Pt eventually agreeable to " transfer to chair.   Donned socks in supine with totalA. Donned underwear in supine with pt able to participate in bridging to initiate pulling underwear up. Lower body dressing completed in standing.   Performed sitting EOB with assist ranging from CGA-maxA   V/c and t/c provided throughout for improved postural control, appropriate weight-shifts, and use of BUE to assist with sitting balance   Darco shoes donned while sitting EOB with totalA.    Performed self-care tasks while seated EOB with min-maxA for sitting balance.   Performed sit<>stand and static standing with maxAx2. Max v/c and t/c for upright posture, forward gaze, and increased trunk, knee, and hip ext. Seated rest following at EOB.   Performed sit<>stand and stand pivot transfer bed > medi-chair with maxAx2. Max v/c and t/c throughout for upright posture, increased hip and knee ext., appropriate weight-shifts, and advancing BLE.   Once seated in medi-chair, required increased time to re-position pt appropriately. B feet positioned on foot rests. Performed scooting posteriorly with drawsheet and totalAx2 for improved position in medi-chair.     Patient left up in medi-chair with all lines intact, call button in reach, RN notified and pt's wife present..    GOALS:    Physical Therapy Goals        Problem: Physical Therapy Goal    Goal Priority Disciplines Outcome Goal Variances Interventions   Physical Therapy Goal     PT/OT, PT Ongoing (interventions implemented as appropriate)     Description:  Goals to be met by: 18     Patient will increase functional independence with mobility by performin. Supine to sit with Moderate Assistance.  2. Sit to supine with Moderate Assistance.  3. Rolling to Left and Right with Minimal Assistance.   4. Sit to stand transfer with Moderate Assistance.  5. Bed to chair transfer with Maximum Assistance.  6. Gait  x 5 feet with Minimal Assistance.   7.  Pt to perf and be compliant with LE HEP to improve  vascularity and mm strength.                            Time Tracking:     PT Received On: 05/08/18  PT Start Time: 0851     PT Stop Time: 0932  PT Total Time (min): 41 min     Billable Minutes: Therapeutic Activity 10 and Neuromuscular Re-education 28   (co-tx with OT)    Treatment Type: Treatment  PT/PTA: PT     PTA Visit Number: 0     Petra Lake PT, DPT   5/8/2018  [phone]

## 2018-05-08 NOTE — SUBJECTIVE & OBJECTIVE
Interval History: 4 Episode of emesis after kaexaylate yesterday. No further NV. Eating grits this am. Still with TF at nigh 8-8am. US showed mod ascites, largest pocket RLQ. Net neg 942 ml. He is on regular diet.   Review of patient's allergies indicates:   Allergen Reactions    No known drug allergies      Current Facility-Administered Medications   Medication Frequency    0.9%  NaCl infusion (for blood administration) Q24H PRN    0.9%  NaCl infusion PRN    acetaminophen tablet 650 mg TID    albumin human 25% bottle 25 g Once    albuterol-ipratropium 2.5mg-0.5mg/3mL nebulizer solution 3 mL Q4H PRN    ALPRAZolam tablet 0.5 mg Q6H PRN    benzonatate capsule 100 mg TID PRN    calcium carbonate 200 mg calcium (500 mg) chewable tablet 500 mg BID PRN    cetirizine tablet 5 mg Daily    dextrose 50% injection 12.5 g PRN    dextrose 50% injection 25 g PRN    docusate sodium capsule 100 mg BID    epoetin jose miguel injection 8,100 Units Every Mon, Wed, Fri    escitalopram oxalate tablet 20 mg Daily    famotidine tablet 20 mg QHS    glucagon (human recombinant) injection 1 mg PRN    glucose chewable tablet 16 g PRN    glucose chewable tablet 24 g PRN    guaiFENesin 12 hr tablet 1,200 mg BID    heparin (porcine) injection 1,000 Units PRN    heparin (porcine) injection 5,000 Units Q12H    insulin aspart U-100 pen 0-5 Units QID (AC + HS) PRN    insulin aspart U-100 pen 3 Units TIDWM    insulin regular (Humulin R) 100 Units in sodium chloride 0.9% 100 mL infusion Continuous    levothyroxine tablet 137 mcg Before breakfast    midodrine tablet 10 mg TID    midodrine tablet 15 mg PRN    mycophenolate capsule 250 mg BID    ondansetron disintegrating tablet 4 mg Q6H PRN    oxyCODONE immediate release tablet 5 mg Q6H PRN    polyethylene glycol packet 17 g BID    potassium, sodium phosphates 280-160-250 mg packet 2 packet Q4H    predniSONE tablet 5 mg Daily    QUEtiapine tablet 50 mg QHS    tacrolimus  capsule 5 mg BID       Objective:     Vital Signs (Most Recent):  Temp: 98 °F (36.7 °C) (05/08/18 0800)  Pulse: (!) 125 (05/08/18 0920)  Resp: 18 (05/08/18 0800)  BP: 119/60 (05/08/18 0800)  SpO2: 96 % (05/08/18 0800)  O2 Device (Oxygen Therapy): room air (05/08/18 0800) Vital Signs (24h Range):  Temp:  [97.6 °F (36.4 °C)-98.9 °F (37.2 °C)] 98 °F (36.7 °C)  Pulse:  [110-125] 125  Resp:  [16-20] 18  SpO2:  [90 %-96 %] 96 %  BP: (104-120)/(60-72) 119/60     Weight: 77.4 kg (170 lb 10.2 oz) (05/07/18 0400)  Body mass index is 26.73 kg/m².  Body surface area is 1.91 meters squared.    I/O last 3 completed shifts:  In: 2268.7 [P.O.:1080; I.V.:28.7; Other:600; NG/GT:560]  Out: 2600 [Other:2600]    Physical Exam   Constitutional: He appears well-developed. No distress.   Trach   HENT:   Head: Normocephalic and atraumatic.   Eyes: Conjunctivae and EOM are normal.   Cardiovascular: Regular rhythm.  Tachycardia present.    Pulmonary/Chest: He has no wheezes. He has no rales.   L IJ TDC   Abdominal: Soft. He exhibits no distension.   LUQ PEG  ascites   Neurological: He is alert.   Skin:   Right necrotic toes 1-3  Left great toe necrotic-same       Significant Labs:  ABGs: No results for input(s): PH, PCO2, HCO3, POCSATURATED, BE in the last 168 hours.  All labs within the past 24 hours have been reviewed.     Significant Imaging:  Labs: Reviewed

## 2018-05-08 NOTE — PROGRESS NOTES
[facility]  Physical Medicine & Rehab  Progress Note    Patient Name: Lv Crocker  MRN: [id number]  Admission Date: 3/11/2018  Length of Stay: 57 days  Attending Physician: Jose A Lloyd MD    Subjective:     Principal Problem:Acute respiratory failure with hypoxia    Hospital Course:   3/24/18:  Evaluated by PT and OT.  Bed mobility and transfers deferred 2/2 intubation and CRRT.   3/26/18:  Participated with PT.  Bed mobility totalA-dependent.  EOB totalA.  3/28/18:  Participated with PT and OT.  Bed mobility totalA-dependent.  ADLs totalA.   4/16/18:  Participated with PT and OT.  Bed mobility max-totalA/dependent.  Sit to stand totalA and transfers totalA.  EOB mod-totalA.  UBD totalA and LBD totalA.  4/17/18:  Participated with PT and SLP.  Bed mobility total-dependent.  EOB x 15 minutes mod-maxA.   4/18/18:  Participated with OT.  Bed mobility totalA.  EOB x 15 minutes totalA.    4/19/18:  Participated with PT, OT, and SLP.  Found to have cognitive-linguistic impairment and dysphonia.  Remains NPO.  Bed mobility total/dependent.  EOB x 24 minutes (static sit x ~10 seconds + ~5 seconds SV) mod-maxA.  No sit to stand, transfers, or gait.  ADLs totalA.   4/22/18:  Participated with PT and OT.  Bed mobility totalA.  EOB maxA.  Sit to stand max-totalA and transfers max-totalA.  LBD maxA.  4/23/18:  No PT or OT 2/2 HD.   4/24/18:  Participated with PT.  Bed mobility maxA.  Sit to stand and transfers maxA.  EOB maxA.   4/25/18:  Participated with OT.  Bed mobility totalA.  Bed to medi chair transfer dependent (totalA x 2).  EOB x 20 minutes maxA.  ADLs totalA.    4/26/18:  Passed MBSS.  SLP recommendation: regular diet and thin liquids (with a chin tuck in isolation of food).  Participated with PT and OT.  Bed mobility totalA.  Declined OOB activty with therapy 2/2 waiting on family to eat and wanted to be in bed for meal.  Medi chair in afternoon.  4/27/18:  Participated with PT, OT,  and SLP.  Bed mobility totalA-dependent.  Sit to stand totalA-dependent.  EOB SBA-maxA.  Standing balance totalA.   4/28/18:  Participated with PT.  Bed mobility totalA-dependent.    4/30/18:  Participated with PT and OT.  Bed mobility totalA (maxA x 2).  Declined EOB 2/2 pain and fatigue from HD.  UBD totalA and LBD totalA.    5/1/18:  Participated with PT, OT, and SLP.  Bed mobility max-totalA.  EOB SV-Carmina.  Sit to stand totalA (maxA x 2) and transfers dependent (totalA x 2).  UBD totalA.  5/2/18:  Participated with PT and OT.  Bed mobility max-dependent (totalA x 2).  EOB CGA-modA.  Sit to stand totalA (maxA x 2).  Stood x 25-30 seconds totalA (maxA x 2).  LBD totalA.  5/3/18:  Participated with PT and OT.  Bed mobility max-totalA.  Sit to stand and transfers max-totalA.  Ambulated 3-4 steps totalA (maxA x 2).  UBD maxA and LBD totalA.   5/7/18:  Refused PT and OT 2/2 fatigue.      Interval History 5/8/2018:  Patient is seen for follow-up rehab evaluation and recommendations: No acute events over night.  Pulmonology consulted for possible de-cannulation.  Participated with therapy today, refused yesterday.  Barriers for discharge/rehab admission: not medically ready for discharge     HPI, Past Medical, Family, and Social History remains the same as documented in the initial encounter.    Scheduled Medications:    acetaminophen  650 mg Oral TID    albumin human 25%  25 g Intravenous Once    cetirizine  5 mg Oral Daily    docusate sodium  100 mg Oral BID    epoetin jose miguel (PROCRIT) injection  100 Units/kg (Dosing Weight) Intravenous Every Mon, Wed, Fri    escitalopram oxalate  20 mg Oral Daily    famotidine  20 mg Oral QHS    guaiFENesin  1,200 mg Oral BID    heparin (porcine)  5,000 Units Subcutaneous Q12H    insulin aspart U-100  3 Units Subcutaneous TIDWM    levothyroxine  137 mcg Oral Before breakfast    midodrine  10 mg Oral TID    mycophenolate  250 mg Oral BID    polyethylene glycol  17 g Oral  BID    potassium, sodium phosphates  2 packet Oral Q4H    predniSONE  5 mg Oral Daily    QUEtiapine  50 mg Oral QHS    tacrolimus  5 mg Oral BID     PRN Medications: sodium chloride, sodium chloride 0.9%, albuterol-ipratropium 2.5mg-0.5mg/3mL, ALPRAZolam, benzonatate, calcium carbonate, dextrose 50%, dextrose 50%, glucagon (human recombinant), glucose, glucose, heparin (porcine), insulin aspart U-100, midodrine, ondansetron, oxyCODONE    Review of Systems   Constitutional: Positive for activity change. Negative for chills, fatigue and fever.   HENT: Negative for drooling, hearing loss, trouble swallowing and voice change.    Eyes: Negative for pain and visual disturbance.   Respiratory: Negative for cough and wheezing.         + trach   Cardiovascular: Negative for chest pain and palpitations.   Gastrointestinal: Negative for abdominal pain, nausea and vomiting.        + PEG, positive acid reflux.    Genitourinary: Negative for difficulty urinating and flank pain.   Musculoskeletal: Positive for arthralgias, gait problem and myalgias. Negative for back pain and neck pain.   Skin: Positive for color change and wound. Negative for rash.   Allergic/Immunologic: Positive for immunocompromised state.   Neurological: Positive for weakness. Negative for dizziness, numbness and headaches.   Psychiatric/Behavioral: Positive for sleep disturbance (improving). Negative for agitation and hallucinations. The patient is not nervous/anxious.      Objective:     Vital Signs (Most Recent):  Temp: 98 °F (36.7 °C) (05/08/18 0800)  Pulse: (!) 127 (05/08/18 1011)  Resp: 18 (05/08/18 1011)  BP: 119/60 (05/08/18 0800)  SpO2: 99 % (05/08/18 1011)    Vital Signs (24h Range):  Temp:  [97.6 °F (36.4 °C)-98.9 °F (37.2 °C)] 98 °F (36.7 °C)  Pulse:  [110-127] 127  Resp:  [16-20] 18  SpO2:  [90 %-99 %] 99 %  BP: (105-119)/(60-72) 119/60     Physical Exam   Constitutional: He is oriented to person, place, and time. He appears well-developed.  No distress.   HENT:   Head: Normocephalic and atraumatic.   Right Ear: External ear normal.   Left Ear: External ear normal.   Nose: Nose normal.   Eyes: Right eye exhibits no discharge. Left eye exhibits no discharge. No scleral icterus.   Neck: Neck supple.   Trach intact.    Cardiovascular: Normal rate, regular rhythm and intact distal pulses.    Pulmonary/Chest: Effort normal. No respiratory distress. He has no wheezes.   Abdominal: Soft. He exhibits no distension. There is no tenderness.   PEG intact, incision LETICIA, CDI   Musculoskeletal: He exhibits no edema.        Right shoulder: He exhibits decreased range of motion and tenderness.   R foot: R great toe and R 3rd toe with necrotic tissue  L foot: small amount of necrotic tissue to L great toe  Overall deconditioning   Neurological: He is alert and oriented to person, place, and time.   -  Mental Status:  AAOx3.  Follows commands.  Answers correct age and .  Recent and remote memory intact.  -  Speech and language:  no aphasia or dysarthria.    -  Motor:  RUE: 3-/5, 3/5 .  LUE: 2+/5, 3/5 .  RLE: Proximal 2/5, distal 3+/5, DF 4-/5, PF 4-/5.  LLE: Proximal 2+/5, distal 4-/5, DF 3-/5, PF 4-/5.  -  Sensory:  Intact to light touch and pin prick.   Skin: Skin is warm and dry. No rash noted.   Psychiatric: His behavior is normal. Thought content normal. His affect is blunt.   Vitals reviewed.    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 Ischemic changes to right second toe and left hallux  -  Wound care following  -  Podiatry following         Severe malnutrition    -  S/p PEG placement on 18  -  On continuous  "TF        Type 1 diabetes mellitus with stage 3 chronic kidney disease    -  Endocrine following  -  On insulin gtt        Acute renal failure with acute tubular necrosis superimposed on stage 3 chronic kidney disease    -  Nephrology following "anuric CACHORRO; likely ischemic ATN 2/2 prolonged pre-renal state (diarrhea) in setting of prograf renal vasoconstriction; cannot r/o septic ATN or Prograf toxicity"  -  On HD  -  S/p perm-a-cath placement on 4/4/18        Heart transplanted    -  S/p OHTX 11/18/2014 with early post-op course complicated by hemorrhagic tamponade s/p  washout, delayed closure, pericardial window with subsequent a-fib with RVR and CACHORRO and late course complicated by ABMR (in 4/2015 s/p rituxan, PLEX, and IVIG), C.diff/CMV reactivation, CKD, and restrictive cardiomyopathy with subsequent chronic/recurrent pleural effusions and ascites previously requiring monthly paracentesis and thoracentesis until he underwent VATS with pleurX catheter placement on 1/11/2018 with removal on 2/7/2018        Debility    -  2/2 prolonged and acute hospital course  -  (I) ADLs and mobility prior to admission, limited by fatigue/endurance  Recommendations  -  Encourage mobility, OOB in chair, and early ambulation as appropriate  -  PT/OT evaluate and treat  -  Pain management  -  Monitor for and prevent skin breakdown and pressure ulcers  · Early mobility, repositioning/weight shifting every 20-30 minutes when sitting, turn patient every 2 hours, proper mattress/overlay and chair cushioning, pressure relief/heel protector boots  -  DVT prophylaxis:  MARK, SCD        Anxiety    -  Anxiety and sleeping difficulty   -  Psych consulted--> increased Lexapro to 20 mg, started Seroquel--> stopped xanax and nortriptyline 2/2 hallucinations        Patient approved for [facility].  Insurance and HD chair pending admission.    Pinky Garcia, MANDA  Department of Physical Medicine & Rehab   [facility]

## 2018-05-08 NOTE — ASSESSMENT & PLAN NOTE
- TTE 4/24/18 showed normal graft function but has known history of restrictive CM post transplant.  - Continue Prednisone 5mg daily, Cellcept 250mg BID and Tacrolimus 5/5 with plan to adjust as needed for goal 6-10.

## 2018-05-08 NOTE — PT/OT/SLP PROGRESS
"Speech Language Pathology Treatment    Patient Name:  Lv Crocker   MRN:  [id number]  Admitting Diagnosis: Acute respiratory failure with hypoxia    Recommendations:                 General Recommendations:  Dysphagia therapy and One Way Speaking Valve  Diet recommendations:  Regular, Liquid Diet Level: Thin   Aspiration Precautions: PMSV for all po intake, 1 bite/sip at a time, Alternating bites/sips, Chin tuck, HOB to 90 degrees, Meds whole 1 at a time, Monitor for s/s of aspiration, Remain upright 30 minutes post meal, Small bites/sips and Standard aspiration precautions   General Precautions: Standard, aspiration, fall  Communication strategies:  One way speaking valve and go to room if call light pushed    Subjective     "I'm wearing it like 12-14 hours a day."  Pt stated re: PMSV  Patient goals: none expressed     Pain/Comfort:  · Pain Rating 1: 0/10  · Pain Rating Post-Intervention 1: 0/10    Objective:     Has the patient been evaluated by SLP for swallowing?   Yes  Keep patient NPO? No   Current Respiratory Status: speaking valve      Pt was awake and alert in NAD.  He was wearing PMSV upon SLP presentation.  He reported no difficulty w/ valve use or meals.  He was able to recall pmsv precautions and basic aspiration precautions given min cues.  He was seated upright for optimal swallowing safety.  He was offered and accepted po trials of thin liquids by cup edge and self fed bites of regular solids (cracker) which he tolerated well w/ adequate oral clearance and no overt signs of airway compromise using chin tuck indep.  Pt completed dsyphagia ex;s x5 reps for tongue base retraction w/ glottal /g, k/ and abdelrahman maneuver.  Education was provided to pt and spouse re: SLP role, diet recs, aspiration precautions, pmsv use/precautions, dysphagia ex's and normal swallow/voice mechanics and SLP POC.  They indicated good understanding and agreed w/ established POC.      Assessment:     Lv Oliveira " Lizzette is a 42 y.o. male with an SLP diagnosis of Dysphagia and One Way Speaking Valve Training.  He presents with good tolerance of current diet and PMSV.    Goals:    SLP Goals        Problem: SLP Goal    Goal Priority Disciplines Outcome   SLP Goal     SLP Ongoing (interventions implemented as appropriate)   Description:  Updated Speech Language Pathology Goals  Goals expected to be met by 5/4  1. Pt will tolerate a regular diet and thin liquids with no overt s/s of aspiration.   2. Pt will demonstrate/verbalize 3 safe swallowing strategies with min cues.   3. Educate Pt on aspiration precautions and S/S aspiration  4. Pt and family will verbalize/demosntrate understanding of PMSV precautions and safety awareness with no cues.  5. Patient will complete dysphagia therapy exercises x10 each with min cues.     Speech Language Pathology Goals  Goals expected to be met by 4/27/18  1.  Pt will tolerate PMSV for 30 minutes w/ no change in respiratory status and fair voicing produced. -met  2.  Pt will participate in ongoing swallow assessment. -met  3. Pt will name up to 12 items/minute in a concrete category, 90% of attempts, Supervision, to improve cognitive organization. -discontinued  4. Pt will recall 3 related items post 3 minute filled delay, 90% of attempts, Supervision, to improve STM.-discontinued  5. Pt will answer m/u YNQ with 90% accuracy, Supervision, to improve higher-level comprehension. --discontinued  6. Pt will complete further assessment of reading/writing skills. -discontinued  7. Educate Pt on aspiration precautions and S/S aspiration. -ongoing  8. Educate Pt and family on PMSV precautions and safety awareness. -met; reinforcment to continue  Goals expected to be met by 4/20/18  1.  Pt will tolerate PMSV for 30 minutes w/ no change in respiratory status and fair voicing produced.  2.  Pt will complete further evaluation of cognitive-linguistic communication skills to determine the need for  additional goals. Met 4/19  3. Pt will name up to 12 items/minute in a concrete category, 90% of attempts, Supervision, to improve cognitive organization  4. Pt will recall 3 related items post 3 minute filled delay, 90% of attempts, Supervision, to improve STM   5. Pt will answer m/u YNQ with 90% accuracy, Supervision, to improve higher-level comprehension   6. Pt will complete further assessment of reading/writing skills  7. Pending MD clearance, Pt will complete further assessment of swallow fx   8. Educate Pt and family on PMSV precautions and safety awareness    Goals expected to be met by 4/13/18   1.  Pt will tolerate PMSV for 3 min w/ no change in respiratory status and fair voicing produced. Met   2.  Pt will complete further evaluation of cognitive-linguistic communication skills to determine the need for additional goals.                               Plan:     · Patient to be seen:  2 x/week   · Plan of Care expires:  05/05/18  · Plan of Care reviewed with:  patient, spouse   · SLP Follow-Up:  Yes       Discharge recommendations:  rehabilitation facility   Barriers to Discharge:  Level of Skilled Assistance Needed rehab    Time Tracking:     SLP Treatment Date:   05/08/18  Speech Start Time:  1342  Speech Stop Time:  1406     Speech Total Time (min):  24 min    Billable Minutes: Treatment Swallowing Dysfunction 12 and Therapeutic Speech Device 12    Jeanine Tabares CCC-SLP  05/08/2018

## 2018-05-08 NOTE — PLAN OF CARE
Problem: Patient Care Overview  Goal: Plan of Care Review  Outcome: Ongoing (interventions implemented as appropriate)  Patient aaox4. Bed kept locked, lowest position with 2x side rails up while in bed. nonslip footwear when out of bed. Ambulates with 2x assistance. Worked with PT/OT today, up in cardiac chair this morning. Wife at bedside, attentive to patient's needs. Call bell and  Personal items within reach. Tubefeed to be resumed tonight. Insulin gtts decreased to  0.4ml/hr per order set. accuchecks ac/hs, insulin given as ordered. Telemetry monitoring, ST. Peg tube in place cdi. Trach, capped, #6 shiley in place; plan for decannulation tomorrow. Generalized edema. Standard precautions maintained. Plan for HD tomorrow and paracentesis Thursday.

## 2018-05-08 NOTE — PLAN OF CARE
Pt seen with staff, Dr. Goodman. He is AAO x 3 and interacts appropriately and pleasantly. Pt has 6.0 cuffless trach in place. He is managing his secretions, which are minimal, without problem. His oxygen requirements in recent days have been modest, although he has been on trach collar QHS. Pt likely is a good candidate for decannulation.     Recommendations:  - stop trach collar QHS  - tonight, please place on nasal cannula as needed for goal SpO2 > 90%  - aim for decannulation tomorrow morning    Jay García MD  Fellow, Pulmonary & Critical Care Medicine  spectralink [phone]

## 2018-05-08 NOTE — PROGRESS NOTES
"[facility]  Endocrinology  Progress Note    Admit Date: 3/11/2018     Reason for Consult: abnormal TFTs    Surgical Procedure and Date: s/p heart transplant 2014     Diabetes diagnosis year: 5yo (T1DM)     Home Diabetes Medications:    Levemir 15u qHS  Novolog 4u AC     How often checking glucose at home? 4 times daily   BG readings on regimen: 150-200s  Hypoglycemia on the regimen?  Yes, rarely - treats appropriately (light snack, glucose tab)  Missed doses on regimen?  No        Diabetes Complications include:     Hyperglycemia, Hypoglycemia  and Diabetic chronic kidney disease          Complicating diabetes co morbidities:   N/A     HPI:   Patient is a 42 y.o. male with a diagnosis of T1DM, HTN, hypothyroidism, s/p heart transplant.  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 He required serial paracentesis and thoracentesis until he underwent a VATS with pleurX catheter placement on 1/11/2018 and removed 2/7/18.       Presented to hospital secondary to diarrhea and cough.  Upon initial labs, TSH was decreased (0.101) and free T4 1.33.  Endocrinology consulted to assist in management of these labs.  He was last seen in clinic by Kamala Vázquez NP 11/2017.   Previous hospitalization, endocrine consulted for same problem.  During that visit, recommended decreasing LT4 to 125mcg daily. Unfortunately, patient was discharged on previous dose of 150mcg daily.     Interval HPI:   Overnight events: none  Eating:   <25%  Nausea: No  Hypoglycemia and intervention: No  Fever: No  TPN and/or TF: Yes  If yes, type of TF/TPN and rate: novasource renal at 30/hr - 8p-8a    /60 (BP Location: Left arm, Patient Position: Lying)   Pulse (!) 127   Temp 98 °F (36.7 °C) (Oral)   Resp 18   Ht 5' 7" (1.702 m)   Wt 77.4 kg (170 lb 10.2 oz)   SpO2 99%   BMI 26.73 kg/m²       Labs Reviewed and Include  "     Recent Labs  Lab 05/08/18  0525   *   CALCIUM 8.9   ALBUMIN 2.3*   PROT 6.2   *   K 3.6   CO2 25   CL 97   BUN 30*   CREATININE 2.4*   ALKPHOS 167*   ALT 10   AST 11   BILITOT 0.4     Lab Results   Component Value Date    WBC 5.82 05/08/2018    HGB 8.8 (L) 05/08/2018    HCT 28.6 (L) 05/08/2018    MCV 96 05/08/2018     (H) 05/08/2018       Recent Labs  Lab 05/05/18  0600   TSH 4.793*   FREET4 0.77     Lab Results   Component Value Date    HGBA1C 5.1 04/05/2018       Nutritional status:   Body mass index is 26.73 kg/m².  Lab Results   Component Value Date    ALBUMIN 2.3 (L) 05/08/2018    ALBUMIN 2.3 (L) 05/07/2018    ALBUMIN 2.2 (L) 05/06/2018     Lab Results   Component Value Date    PREALBUMIN 13 (L) 04/08/2018    PREALBUMIN 5 (L) 03/15/2018    PREALBUMIN 18 (L) 03/24/2015       Estimated Creatinine Clearance: 36.7 mL/min (A) (based on SCr of 2.4 mg/dL (H)).    Accu-Checks  Recent Labs      05/06/18   1818  05/06/18   2210  05/07/18   0237  05/07/18   0716  05/07/18   0902  05/07/18   1220  05/07/18   1820  05/07/18   2152  05/08/18   0131  05/08/18   0813   POCTGLUCOSE  107  133*  184*  226*  184*  183*  171*  174*  184*  205*       Current Medications and/or Treatments Impacting Glycemic Control  Immunotherapy:  Immunosuppressants         Stop Route Frequency     tacrolimus capsule 5 mg      -- Oral 2 times daily     mycophenolate capsule 250 mg      -- Oral 2 times daily        Steroids:   Hormones     Start     Stop Route Frequency Ordered    05/01/18 0900  predniSONE tablet 5 mg      -- Oral Daily 04/30/18 1513        Pressors:    Autonomic Drugs     Start     Stop Route Frequency Ordered    04/30/18 0705  midodrine tablet 15 mg      -- Oral As needed (PRN) 04/30/18 0707    04/25/18 0900  midodrine tablet 10 mg      -- Oral 3 times daily 04/25/18 0650        Hyperglycemia/Diabetes Medications: Antihyperglycemics     Start     Stop Route Frequency Ordered    05/06/18 0815  insulin aspart  U-100 pen 3 Units      -- SubQ 3 times daily with meals 05/06/18 0803    04/26/18 2000  insulin aspart U-100 pen 0-5 Units      -- SubQ Before meals & nightly PRN 04/26/18 1904    04/11/18 0015  insulin regular (Humulin R) 100 Units in sodium chloride 0.9% 100 mL infusion     Question:  Insulin Rate Adjustment (DO NOT MODIFY ANSWER)  Answer:  [email or web address]    -- IV Continuous 04/10/18 2313          ASSESSMENT and PLAN    * Acute respiratory failure with hypoxia    Per primary        Type 1 diabetes mellitus with stage 3 chronic kidney disease    BG goal 140-180,       Will need change in infusion rates when TF on:  1.3 units/hr when tube feeds on (8p-8a)  0.6 units/hr when tube feeds off (8a-8p)   novolog 3 units with meals  Low dose correction  POC glucose ac/hs/0200    Paged primary team regarding need for ongoing feeds (see above). Awaiting call back to discuss.    Notify endocrine for changes in nutrition status (diet, TF, TPN)    Pt is T1DM, do not suspend insulin >1 hr   If TF held, decrease insulin rate to 0.5u/hr     Discharge Recommendations:  TBD        Hypothyroidism due to Hashimoto's thyroiditis    Continue LT4 137 mcg/day - give when TF off.  TSH improving.        Current chronic use of systemic steroids    Can increase insulin requirement        Acute renal failure with acute tubular necrosis superimposed on stage 3 chronic kidney disease    Estimated Creatinine Clearance: 36.7 mL/min (A) (based on SCr of 2.4 mg/dL (H)).  Avoid insulin stacking          Heart transplanted    Per transplant  Avoid hypoglycemia  On PDN and prograf - could lead to prandial elevations and insulin resistance.            Ankur Keith MD  Endocrinology  [facility]

## 2018-05-08 NOTE — ASSESSMENT & PLAN NOTE
- Resolved. S/P Bronch and intubation 3/14 now post tracheostomy due to inability to extubate  - Etiology = RSV   - Pulmonology / Gen surg reconsulted for possible trach removal. Awaiting input   - Appreciate PT/OT/Wound care.

## 2018-05-08 NOTE — PROGRESS NOTES
[facility]  Heart Transplant  Progress Note    Patient Name: Lv Crocker  MRN: [id number]  Admission Date: 3/11/2018  Hospital Length of Stay: 57 days  Attending Physician: Jose A Lloyd MD  Primary Care Provider: Philippe Mohr MD  Principal Problem:Acute respiratory failure with hypoxia    Subjective:     Interval History: Some N/V overnight that resolved after small BM this morning. Abd US showing moderate ascites and pulmonary effusions. Long discussion with patient and wife regarding plan going forward. Pulmonology to weigh in regarding tracheostomy decannulation.     Continuous Infusions:   insulin (HUMAN R) infusion (adults) 0.6 Units/hr (05/08/18 0804)     Scheduled Meds:   acetaminophen  650 mg Oral TID    albumin human 25%  25 g Intravenous Once    cetirizine  5 mg Oral Daily    docusate sodium  100 mg Oral BID    epoetin jose miguel (PROCRIT) injection  100 Units/kg (Dosing Weight) Intravenous Every Mon, Wed, Fri    escitalopram oxalate  20 mg Oral Daily    famotidine  20 mg Oral QHS    guaiFENesin  1,200 mg Oral BID    heparin (porcine)  5,000 Units Subcutaneous Q12H    insulin aspart U-100  3 Units Subcutaneous TIDWM    levothyroxine  137 mcg Oral Before breakfast    midodrine  10 mg Oral TID    mycophenolate  250 mg Oral BID    polyethylene glycol  17 g Oral BID    potassium, sodium phosphates  2 packet Oral Q4H    predniSONE  5 mg Oral Daily    QUEtiapine  50 mg Oral QHS    tacrolimus  5 mg Oral BID     PRN Meds:sodium chloride, sodium chloride 0.9%, albuterol-ipratropium 2.5mg-0.5mg/3mL, ALPRAZolam, benzonatate, calcium carbonate, dextrose 50%, dextrose 50%, glucagon (human recombinant), glucose, glucose, heparin (porcine), insulin aspart U-100, midodrine, ondansetron, oxyCODONE    Review of patient's allergies indicates:   Allergen Reactions    No known drug allergies      Objective:     Vital Signs (Most Recent):  Temp: 98 °F (36.7 °C) (05/08/18  0800)  Pulse: (!) 127 (05/08/18 1011)  Resp: 18 (05/08/18 1011)  BP: 119/60 (05/08/18 0800)  SpO2: 99 % (05/08/18 1011) Vital Signs (24h Range):  Temp:  [97.6 °F (36.4 °C)-98.9 °F (37.2 °C)] 98 °F (36.7 °C)  Pulse:  [110-127] 127  Resp:  [16-20] 18  SpO2:  [90 %-99 %] 99 %  BP: (104-119)/(60-72) 119/60     Patient Vitals for the past 72 hrs (Last 3 readings):   Weight   05/07/18 0400 77.4 kg (170 lb 10.2 oz)     Body mass index is 26.73 kg/m².    Intake/Output Summary (Last 24 hours) at 05/08/18 1021  Last data filed at 05/08/18 0654   Gross per 24 hour   Intake          1777.07 ml   Output             2600 ml   Net          -822.93 ml     Physical Exam   Constitutional: He is oriented to person, place, and time. He appears well-developed and well-nourished.   Neck: Normal range of motion. Neck supple. No JVD present.   Cardiovascular: Normal rate and regular rhythm.  Exam reveals no gallop and no friction rub.    No murmur heard.  Pulmonary/Chest: Effort normal and breath sounds normal. He has no wheezes. He has no rales.   Trach in place   Abdominal: Soft. Bowel sounds are normal. He exhibits distension. There is no tenderness.   Musculoskeletal: He exhibits no edema.   Neurological: He is alert and oriented to person, place, and time.   Skin: Skin is warm and dry.   RLE- 1st and 3rd gangrenous toes     Significant Labs:  CBC:    Recent Labs  Lab 05/06/18  0600 05/07/18  0600 05/08/18  0525   WBC 5.86 6.65 5.82   RBC 2.96* 2.98* 2.97*   HGB 8.8* 8.9* 8.8*   HCT 28.3* 28.1* 28.6*   * 365* 358*   MCV 96 94 96   MCH 29.7 29.9 29.6   MCHC 31.1* 31.7* 30.8*     BNP:  No results for input(s): BNP in the last 168 hours.    Invalid input(s): BNPTRIAGELBLO  CMP:    Recent Labs  Lab 05/06/18  0600 05/07/18  0600 05/08/18  0525   * 225* 209*   CALCIUM 9.0 9.4 8.9   ALBUMIN 2.2* 2.3* 2.3*   PROT 6.4 6.2 6.2   * 131* 132*   K 5.0 4.8 3.6   CO2 22* 23 25   CL 94* 95 97   BUN 40* 55* 30*   CREATININE 3.0*  "3.5* 2.4*   ALKPHOS 166* 170* 167*   ALT 9* 11 10   AST 21 13 11   BILITOT 0.4 0.4 0.4      Microbiology:  Microbiology Results (last 7 days)     Procedure Component Value Units Date/Time    Blood culture [[id number]] Collected:  05/05/18 0644    Order Status:  Completed Specimen:  Blood from Midline, Basilic, Right Updated:  05/08/18 1012     Blood Culture, Routine No Growth to date     Blood Culture, Routine No Growth to date     Blood Culture, Routine No Growth to date     Blood Culture, Routine No Growth to date    Blood culture [[id number]] Collected:  05/05/18 0812    Order Status:  Completed Specimen:  Blood Updated:  05/08/18 1012     Blood Culture, Routine No Growth to date     Blood Culture, Routine No Growth to date     Blood Culture, Routine No Growth to date     Blood Culture, Routine No Growth to date    Narrative:       Collection has been rescheduled by CDP at 5/5/2018 07:40 Reason: Due   now  Collection has been rescheduled by CDP at 5/5/2018 07:40 Reason: Due   now    Culture, Respiratory with Gram Stain [[id number]]     Order Status:  Canceled Specimen:  Respiratory     Respiratory Viral Panel by PCR Migelner; Nasal Swab [[id number]] Collected:  05/05/18 0644    Order Status:  Sent Specimen:  Respiratory Updated:  05/05/18 0802    Blood culture [[id number]]     Order Status:  Canceled Specimen:  Blood     Culture, Respiratory with Gram Stain [[id number]]     Order Status:  Canceled Specimen:  Respiratory from Tracheal Aspirate     Fungus culture [[id number]] Collected:  03/28/18 1607    Order Status:  Completed Specimen:  Body Fluid from Lung, RLL Updated:  05/01/18 1347     Fungus (Mycology) Culture No fungus isolated after 4 weeks        I have reviewed all pertinent labs within the past 24 hours.    Estimated Creatinine Clearance: 36.7 mL/min (A) (based on SCr of 2.4 mg/dL (H)).    Diagnostic Results:  ABD US (5/8/18): "Moderate volume ascites. 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 Most recently has had ongoing issues with his lungs, had gotten 2 thoracenteses, after which he underwent VATS 01/19/18 followed by pleurex catheter placement and effusion drainage 01/11/18, subsequently had it removed 02/07/18 after drainage had decreased despite multiple attempts to drain it. Has been having significant coughing fits that result in him being near-syncopal at times, although difficult to say if he has passed out or not- patient most recently was admitted to the hospital for increased AGUILAR, coughing and pre-syncope 02/11-02/14/18 at [facility].   Patient was started on antibiotics for suspected bacterial infection, with some diarrhea, bronchitis, and possible pneumonia. Ct chest showed some pleural fluid collection and after talking with Dr. James, we arranged for him to receive a diagnostic tap with IR, from which the fluid collection demonstrated no growth or significant findings at all really. Cell counts do not appear to have been sent but will recheck. Patient states since his hospital stay, yesterday had a significant coughing fit again, after which he nearly passed out, is being seen in clinic today for this. Denies any cardiopulmonary complaints, no leg swelling, has some abdominal swelling, which is moderate for him. Denies significant shortness of breath with mild exertion, had 6MWT yesterday to see if he qualified for home O2, and his O2 sats actually improved after recovery from where he started initially. He and his wife admit he is in bed most days, not very active.     Mr. Crocker presented to the ED 3/11/18 with approximately 3 weeks of worsening diarrhea and 2-3 days of sinus pressure, drainage, and worsened cough.  He notes that he had a coughing fit last night and  passed out, coughed throughout most of the night.  This also happened on 2/25/18.  He last saw Dr Ferreira in clinic on 2/20/18 where he was taken off myfortic and switched to cellcept (he hadn't been on cellcept because of leukopenia when they tried post-transplant).  Though his diarrhea had improved after his last discharge, he states it has increased now to 15x/day, clear/yellow/green, no fevers, no exacerbating foods per say.  He was taken back off the cellcept and placed back on myfortic this past week and has not noticed immediate change in diarrhea. He also reports his baseline coughing fits havent improved and are minimally responsive to tessalon pearls.  Came on last night, he lost consciousness during a fit once which is relatively normal for him, and had two more during HPI.  He notes no sick contacts but does state he's had some URI symptoms as above.  His baseline vitals are usually -120 and BP 90s/60s-110s/70s.  He reports a history of intermittent diarrhea - did have Cdiff many years ago but this smells different.  No abdominal pain, no nausea, no vomiting.  No blood in diarrhea.  Appears last c-scope in 2015 with no evidence of infection or inflammatory processes.  He does report IBS which is different that this.       * Acute respiratory failure with hypoxia    - Resolved. S/P Bronch and intubation 3/14 now post tracheostomy due to inability to extubate  - Etiology = RSV   - Pulmonology / Gen surg reconsulted for possible trach removal. Awaiting input   - Appreciate PT/OT/Wound care.        Heart transplanted    - TTE 4/24/18 showed normal graft function but has known history of restrictive CM post transplant.  - Continue Prednisone 5mg daily, Cellcept 250mg BID and Tacrolimus 5/5 with plan to adjust as needed for goal 6-10.        Abdominal distention    - Abd U/S reviewed.   - Planning for paracentesis this Thursday per IR (non dialysis day)   - May need to discuss peg tube removal as ascites  makes risk of infection much higher.         Type 1 diabetes mellitus with stage 3 chronic kidney disease    - Appreciate Endocrine's recs  - Insulin gtt per endocrine recs. Adjust as needed  - Per endocrine please call when he is off tube feeds to adjust ggt and transition to rehab/outpatient regimen        Hypothyroidism due to Hashimoto's thyroiditis    - Appreciate Endocrine's recs  - Switched to PO Levothyroxine 137mcg daily        Alteration in skin integrity    - Wound care following. Local care only at this point.         Restrictive cardiomyopathy    - No changes (see above)         Acute renal failure with acute tubular necrosis superimposed on stage 3 chronic kidney disease    - Appreciate Nephrology recs.   - Continue midodrine  - HD per nephrology SAVANNA W F.         Anemia    - Last transfusion 5/4/18  - Nephrology has resumed ProCrit         Debility    - PT/OT/SLP daily. Recommending rehab.  - Nutrition following.   - Passed MBSS with speech. Started regular diet with thin liquids, but will continue tube feeds nocturnal until eating 75% of meals         Anxiety    - Increased Lexapro to 20mg daily  - Continue seroquel 50 QHS   - Continue .5mg xanax PRN Q8H   - Appreciate psychiatry input.             Mamadou rhodes, DO  Heart Transplant  [facility]

## 2018-05-09 PROBLEM — N17.9 AKI (ACUTE KIDNEY INJURY): Status: ACTIVE | Noted: 2018-01-01

## 2018-05-09 NOTE — ASSESSMENT & PLAN NOTE
- Resolved.   - S/P Bronch and intubation 3/14 now post tracheostomy due to inability to extubate  - Etiology = RSV   - Trach decanulation this AM per pulmonology   - Appreciate PT/OT/Wound care.

## 2018-05-09 NOTE — PT/OT/SLP PROGRESS
Physical Therapy      Patient Name:  Lv Crocker   MRN:  [id number]    Patient not seen today secondary to Dialysis. Will follow-up at next scheduled session as able.    Petra Lake, PT, DPT   5/9/2018  [phone]

## 2018-05-09 NOTE — ASSESSMENT & PLAN NOTE
- Appreciate Nephrology recs.   - Continue midodrine  - HD per nephrology SAVANNA MCDONALD.   - Needs to have fistula at some point to reduce risk of infection with Permacath

## 2018-05-09 NOTE — ASSESSMENT & PLAN NOTE
- PT/OT/SLP daily. Recommending rehab.  - Nutrition following.   - Passed MBSS with speech. Started regular diet with thin liquids, but will continue tube feeds nocturnal until eating 75% of meals   - Needs to have PEG removed if possible.

## 2018-05-09 NOTE — NURSING
Rounds Report: Attended interdisciplinary rounds this afternoon with the transplant team including SW, physicians, fellows,  mid-level providers, and transplant coordinators.  Discussed plan of care which includes the trach decannulation and if that works, the next step would be to DC peg tube and plan on moving to rehab next week.

## 2018-05-09 NOTE — PLAN OF CARE
"Cuffless trach removed at bedside today, no difficulty or complication. Well tolerated by patient. He was able to phonate afterward and said: "Thank you for taking that out."     Recommendations:  - monitor overnight for secretions, blood (should be minor)  - Pt instructed in how to apply pressure to dressing over stoma in order to phonate better  - Pt also advised that the stoma will heal and close up over days to weeks and of any infectious symptoms that warrant calling MD  - call if he has any problems    Plan discussed with nurse.     Jay García MD  Fellow, Pulmonary & Critical Care Medicine  spectralink [phone]    "

## 2018-05-09 NOTE — PROGRESS NOTES
"[facility]  Endocrinology  Progress Note    Admit Date: 3/11/2018     Reason for Consult: abnormal TFTs    Surgical Procedure and Date: s/p heart transplant      Diabetes diagnosis year: 5yo (T1DM)     Home Diabetes Medications:    Levemir 15u qHS  Novolog 4u AC     How often checking glucose at home? 4 times daily   BG readings on regimen: 150-200s  Hypoglycemia on the regimen?  Yes, rarely - treats appropriately (light snack, glucose tab)  Missed doses on regimen?  No        Diabetes Complications include:     Hyperglycemia, Hypoglycemia  and Diabetic chronic kidney disease          Complicating diabetes co morbidities:   N/A     HPI:   Patient is a 42 y.o. male with a diagnosis of T1DM, HTN, hypothyroidism, s/p heart transplant.  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 He required serial paracentesis and thoracentesis until he underwent a VATS with pleurX catheter placement on 2018 and removed 18.       Presented to hospital secondary to diarrhea and cough.  Upon initial labs, TSH was decreased (0.101) and free T4 1.33.  Endocrinology consulted to assist in management of these labs.  He was last seen in clinic by Kamala Vázquez NP 2017.   Previous hospitalization, endocrine consulted for same problem.  During that visit, recommended decreasing LT4 to 125mcg daily. Unfortunately, patient was discharged on previous dose of 150mcg daily.     Interval HPI:   Overnight events: bg elevated overnight  Eatin%  Nausea: No  Hypoglycemia and intervention: No  Fever: No  TPN and/or TF: Yes  If yes, type of TF/TPN and rate: novasource renal at 30cc/hr - 8p - 8a  BP (!) 101/59   Pulse (!) 114   Temp 98.4 °F (36.9 °C) (Oral)   Resp 18   Ht 5' 7" (1.702 m)   Wt 80.3 kg (177 lb 0.5 oz)   SpO2 100%   BMI 27.73 kg/m²       Labs Reviewed and Include      Recent Labs  Lab " 05/09/18  0500   *   CALCIUM 8.2*   ALBUMIN 2.0*   PROT 5.7*   *   K 3.7   CO2 23      BUN 37*   CREATININE 2.9*   ALKPHOS 153*   ALT 8*   AST 11   BILITOT 0.3     Lab Results   Component Value Date    WBC 6.19 05/09/2018    HGB 8.8 (L) 05/09/2018    HCT 28.5 (L) 05/09/2018    MCV 96 05/09/2018     (H) 05/09/2018       Recent Labs  Lab 05/05/18  0600   TSH 4.793*   FREET4 0.77     Lab Results   Component Value Date    HGBA1C 5.1 04/05/2018       Nutritional status:   Body mass index is 27.73 kg/m².  Lab Results   Component Value Date    ALBUMIN 2.0 (L) 05/09/2018    ALBUMIN 2.3 (L) 05/08/2018    ALBUMIN 2.3 (L) 05/07/2018     Lab Results   Component Value Date    PREALBUMIN 13 (L) 04/08/2018    PREALBUMIN 5 (L) 03/15/2018    PREALBUMIN 18 (L) 03/24/2015       Estimated Creatinine Clearance: 33 mL/min (A) (based on SCr of 2.9 mg/dL (H)).    Accu-Checks  Recent Labs      05/07/18   1820  05/07/18   2152  05/08/18   0131  05/08/18   0813  05/08/18   1200  05/08/18   1710  05/08/18   2124  05/09/18   0210  05/09/18   0721  05/09/18   1049   POCTGLUCOSE  171*  174*  184*  205*  124*  204*  179*  239*  275*  170*       Current Medications and/or Treatments Impacting Glycemic Control  Immunotherapy:  Immunosuppressants         Stop Route Frequency     tacrolimus capsule 5 mg      -- Oral 2 times daily     mycophenolate capsule 250 mg      -- Oral 2 times daily        Steroids:   Hormones     Start     Stop Route Frequency Ordered    05/01/18 0900  predniSONE tablet 5 mg      -- Oral Daily 04/30/18 1513        Pressors:    Autonomic Drugs     Start     Stop Route Frequency Ordered    04/30/18 0705  midodrine tablet 15 mg      -- Oral As needed (PRN) 04/30/18 0707    04/25/18 0900  midodrine tablet 10 mg      -- Oral 3 times daily 04/25/18 0650        Hyperglycemia/Diabetes Medications: Antihyperglycemics     Start     Stop Route Frequency Ordered    05/06/18 0815  insulin aspart U-100 pen 3 Units       -- SubQ 3 times daily with meals 05/06/18 0803    04/26/18 2000  insulin aspart U-100 pen 0-5 Units      -- SubQ Before meals & nightly PRN 04/26/18 1904    04/11/18 0015  insulin regular (Humulin R) 100 Units in sodium chloride 0.9% 100 mL infusion     Question:  Insulin Rate Adjustment (DO NOT MODIFY ANSWER)  Answer:  [email or web address]    -- IV Continuous 04/10/18 2313          ASSESSMENT and PLAN    Type 1 diabetes mellitus with stage 3 chronic kidney disease    BG goal 140-180,       Increase rate to 1.6 units/hr when tube feeds on (8p-8a)  0.6 units/hr when tube feeds off (8a-8p)   increase novolog to 4 units with meals  Low dose correction  POC glucose /hs/0200      Notify endocrine for changes in nutrition status (diet, TF, TPN)    Pt is T1DM, do not suspend insulin >1 hr   If TF held, decrease insulin rate to 0.5u/hr     Discharge Recommendations:  TBD        Hypothyroidism due to Hashimoto's thyroiditis    Continue LT4 137 mcg/day - give when TF off.  TSH improving.        On enteral nutrition    Contributing to increased insulin requirements overnight        Current chronic use of systemic steroids    Can increase insulin requirement        ESRD (end stage renal disease)    Titrate cautiously.  Caution with insulin stacking.  Avoid hypoglycemia.        Acute renal failure with acute tubular necrosis superimposed on stage 3 chronic kidney disease    Estimated Creatinine Clearance: 33 mL/min (A) (based on SCr of 2.9 mg/dL (H)).  Avoid insulin stacking          Heart transplanted    Per transplant  Avoid hypoglycemia  On PDN and prograf - could lead to prandial elevations and insulin resistance.            Ankur Keith MD  Endocrinology  [facility]

## 2018-05-09 NOTE — ASSESSMENT & PLAN NOTE
Estimated Creatinine Clearance: 33 mL/min (A) (based on SCr of 2.9 mg/dL (H)).  Avoid insulin stacking

## 2018-05-09 NOTE — PLAN OF CARE
Problem: Patient Care Overview  Goal: Plan of Care Review  Outcome: Ongoing (interventions implemented as appropriate)  Pt has call bell in reach, non slip socks on, and bedrails up x2. Pt with family at bedside. Pt has labs in the am. Pt encouraged to wash hands. Pt has accuchecks ac/hs and 2 am with insulin gtt. Pt placed on wedge throughout the shift.

## 2018-05-09 NOTE — PROGRESS NOTES
[facility]  Nephrology  Progress Note    Patient Name: Lv Crocker  MRN: [id number]  Admission Date: 3/11/2018  Hospital Length of Stay: 58 days  Attending Provider: Jose A Lloyd MD   Primary Care Physician: Philippe Mohr MD  Principal Problem:Acute respiratory failure with hypoxia    Subjective:     HPI: Mr. Crocker is a 43 yo WM with T1DM, Hashimoto thyroiditis, h/o OHTx 11/2014, and CKD stage 4 who was admitted on 3/11/18 for persistent diarrhea. He reported a 3 week history of diarrhea up to 15x/day. Associated symptoms including URI symptoms, coughing fits, and coughing syncope. Prograf level was 14.3. His post-heart transplant course has been complicated by AMR 4/2015, CMV, post-transplant restrictive cardiomyopathy, recurrent pleural effusions/ascites requiring monthly thoracenteses/paracenteses, VATS 1/11/18 with Pleur-X catheter (now removed), and CKD stage 4 with baseline sCr 2.6-3.0. Baseline -120s and baseline BP 90s-110s/60-70s. Upon admission he was started on IVF and diarrhea workup was initiated. On 3/12 he was noted to have decreased UOP despite fluids; NS was increased to 100cc/hr. He was scheduled for a colonoscopy on 3/13 however procedure was cancelled due to hypoxia and fever. He was transferred to the ICU for closer monitoring. He continued to have oliguria overnight. Bladder scan revealed 200cc of urine but patient refused osullivan catheter placement. Wife reports that patient always has a hard time urinating again after osullivan catheters are placed/removed so he refuses them. He remained hypoxic despite FiO2 70% and ABG revealed combined respiratory and metabolic acidosis with pH 7.17. He was started on BiPAP as well as a bicarb infusion at 50cc/hr. ABG with mild improvement. AM labs revealed K 6.2 and he was shifted and given kayexalate.Trialysis catheter was placed this morning and he subsequently developed hypotension and was started on levophed. He was  intubated later this morning. Nephrology consulted for CACHORRO. All history obtained by primary team and chart review as patient was intubated/sedated on exam. Consent for dialysis obtained by patient's wife and placed in chart. Of note, both of patient's parents were on dialysis.     Interval History: Feeling better today. Seen in BHAVIN 3.5 hrs duration. Tolerated 2.5 L net UF. Plan for paracentesis tomorrow.    Pulmonary aim for decannulation tomorrow.     Review of patient's allergies indicates:   Allergen Reactions    No known drug allergies      Current Facility-Administered Medications   Medication Frequency    0.9%  NaCl infusion (for blood administration) Q24H PRN    0.9%  NaCl infusion PRN    0.9%  NaCl infusion PRN    0.9%  NaCl infusion Once    acetaminophen tablet 650 mg TID    albuterol-ipratropium 2.5mg-0.5mg/3mL nebulizer solution 3 mL Q4H PRN    ALPRAZolam tablet 0.5 mg Q6H PRN    benzonatate capsule 100 mg TID PRN    calcium carbonate 200 mg calcium (500 mg) chewable tablet 500 mg BID PRN    cetirizine tablet 5 mg Daily    dextrose 50% injection 12.5 g PRN    dextrose 50% injection 25 g PRN    docusate sodium capsule 100 mg BID    epoetin jose miguel injection 8,100 Units Every Mon, Wed, Fri    escitalopram oxalate tablet 20 mg Daily    famotidine tablet 20 mg QHS    glucagon (human recombinant) injection 1 mg PRN    glucose chewable tablet 16 g PRN    glucose chewable tablet 24 g PRN    guaiFENesin 12 hr tablet 1,200 mg BID    heparin (porcine) injection 1,000 Units PRN    heparin (porcine) injection 5,000 Units Q12H    insulin aspart U-100 pen 0-5 Units QID (AC + HS) PRN    insulin aspart U-100 pen 4 Units TIDWM    insulin regular (Humulin R) 100 Units in sodium chloride 0.9% 100 mL infusion Continuous    levothyroxine tablet 137 mcg Before breakfast    midodrine tablet 10 mg TID    midodrine tablet 15 mg PRN    mycophenolate capsule 250 mg BID    ondansetron disintegrating  tablet 4 mg Q6H PRN    oxyCODONE immediate release tablet 5 mg Q6H PRN    polyethylene glycol packet 17 g BID    predniSONE tablet 5 mg Daily    QUEtiapine tablet 50 mg QHS    tacrolimus capsule 5 mg BID       Objective:     Vital Signs (Most Recent):  Temp: 98.6 °F (37 °C) (05/09/18 1135)  Pulse: (!) 115 (05/09/18 1135)  Resp: 18 (05/09/18 1135)  BP: 117/69 (05/09/18 1135)  SpO2: 95 % (05/09/18 1225)  O2 Device (Oxygen Therapy): room air (05/09/18 1225) Vital Signs (24h Range):  Temp:  [98.2 °F (36.8 °C)-99.5 °F (37.5 °C)] 98.6 °F (37 °C)  Pulse:  [111-117] 115  Resp:  [16-19] 18  SpO2:  [92 %-100 %] 95 %  BP: ()/(54-82) 117/69     Weight: 80.3 kg (177 lb 0.5 oz) (05/09/18 0400)  Body mass index is 27.73 kg/m².  Body surface area is 1.95 meters squared.    I/O last 3 completed shifts:  In: 2333.8 [P.O.:1380; I.V.:33.8; NG/GT:920]  Out: 0     Physical Exam   Constitutional: He appears well-developed. No distress.   Trach   HENT:   Head: Normocephalic and atraumatic.   Eyes: Conjunctivae and EOM are normal.   Cardiovascular: Regular rhythm.  Tachycardia present.    Pulmonary/Chest: He has no wheezes. He has no rales.   L IJ TDC   Abdominal: Soft. He exhibits no distension.   LUQ PEG  ascites   Neurological: He is alert.   Skin:   Right necrotic toes 1-3  Left great toe necrotic-same       Significant Labs:  All labs within the past 24 hours have been reviewed.     Significant Imaging:  Labs: Reviewed    Assessment/Plan:     CACHORRO (acute kidney injury) with ATN superimposed CKD 3     Acute renal failure with acute tubular necrosis superimposed on stage 3 chronic kidney disease     - baseline sCr 2.6-3.0 consistent with CKD stage IV  - anuric CACHORRO likely ischemic ATN from prolonged pre-renal state (diarrhea) in setting of prograf renal vasoconstriction; cannot r/o septic ATN or Prograf toxicity  - SLED started 3/14. TDC placed 4/4/18.   - remains anuric  -Continue epo  -Pulmonary plan for trach removal tomorrow  5/10 then evaluate for PEG removal. Plan to transfer to inpatient rehab.   -5/8  Remains anuric. Patient seen in [facility]. Tolerating 2 L UF.  Albumin and midodrine PRN. Continue MWF HD while inpatient.      Ascites  -Reports PTA paracentesis every 1-2 months with 2-3 L removal each time.   -Plan for paracentesis tomorrow.      Anemia of CKD  -Iron 70, T sat 35, TIBC 225 and ferritan 1287  -Continue epo. Increased epo 5/4   -PRBC transfusion        BMD          Lab Results   Component Value Date     PTH 23.0 04/19/2018     CALCIUM 9.4 05/07/2018     CAION 0.98 (L) 03/17/2018     PHOS 3.1 05/07/2018                        Thank you for your consult. I will follow-up with patient. Please contact us if you have any additional questions.    Amanda Cuellar NP  Nephrology  [facility]

## 2018-05-09 NOTE — ASSESSMENT & PLAN NOTE
BG goal 140-180,       Increase rate to 1.6 units/hr when tube feeds on (8p-8a)  0.6 units/hr when tube feeds off (8a-8p)   increase novolog to 4 units with meals  Low dose correction  POC glucose /hs/0200      Notify endocrine for changes in nutrition status (diet, TF, TPN)    Pt is T1DM, do not suspend insulin >1 hr   If TF held, decrease insulin rate to 0.5u/hr     Discharge Recommendations:  TBD

## 2018-05-09 NOTE — PROGRESS NOTES
3.5hr HD treatment completed 2.5L of fluid removed pt tolerated well. Epoetin given as ordered. Both lumens of a L chestwall permcath instilled with 1.9cc of Heparin, capped and taped. Report given to SANFORD Robles.

## 2018-05-09 NOTE — ASSESSMENT & PLAN NOTE
Acute renal failure with acute tubular necrosis superimposed on stage 3 chronic kidney disease     - baseline sCr 2.6-3.0 consistent with CKD stage IV  - anuric CACHORRO likely ischemic ATN from prolonged pre-renal state (diarrhea) in setting of prograf renal vasoconstriction; cannot r/o septic ATN or Prograf toxicity  - SLED started 3/14. TDC placed 4/4/18.   - remains anuric  -Continue epo  -Pulmonary plan for trach removal tomorrow 5/10 then evaluate for PEG removal. Plan to transfer to inpatient rehab.   -5/8  Remains anuric. Patient seen in [facility]. Tolerating 2 L UF.  Albumin and midodrine PRN. Continue MWF HD while inpatient.      Ascites  -Reports PTA paracentesis every 1-2 months with 2-3 L removal each time.   -Plan for paracentesis tomorrow.      Anemia of CKD  -Iron 70, T sat 35, TIBC 225 and ferritan 1287  -Continue epo. Increased epo 5/4   -PRBC transfusion        BMD          Lab Results   Component Value Date     PTH 23.0 04/19/2018     CALCIUM 9.4 05/07/2018     CAION 0.98 (L) 03/17/2018     PHOS 3.1 05/07/2018

## 2018-05-09 NOTE — PROGRESS NOTES
[facility]  Heart Transplant  Progress Note    Patient Name: Lv Crocker  MRN: [id number]  Admission Date: 3/11/2018  Hospital Length of Stay: 58 days  Attending Physician: Jose A Lloyd MD  Primary Care Provider: Philippe Mohr MD  Principal Problem:Acute respiratory failure with hypoxia    Subjective:     Interval History: No acute issues overnight. Planning for trach decanulation this AM after dialysis     Continuous Infusions:   insulin (HUMAN R) infusion (adults) 0.6 Units/hr (05/09/18 0805)     Scheduled Meds:   sodium chloride 0.9%   Intravenous Once    acetaminophen  650 mg Oral TID    cetirizine  5 mg Oral Daily    docusate sodium  100 mg Oral BID    epoetin jose miguel (PROCRIT) injection  100 Units/kg (Dosing Weight) Intravenous Every Mon, Wed, Fri    escitalopram oxalate  20 mg Oral Daily    famotidine  20 mg Oral QHS    guaiFENesin  1,200 mg Oral BID    heparin (porcine)  5,000 Units Subcutaneous Q12H    insulin aspart U-100  4 Units Subcutaneous TIDWM    levothyroxine  137 mcg Oral Before breakfast    midodrine  10 mg Oral TID    mycophenolate  250 mg Oral BID    polyethylene glycol  17 g Oral BID    predniSONE  5 mg Oral Daily    QUEtiapine  50 mg Oral QHS    tacrolimus  5 mg Oral BID     PRN Meds:sodium chloride, sodium chloride 0.9%, sodium chloride 0.9%, albuterol-ipratropium 2.5mg-0.5mg/3mL, ALPRAZolam, benzonatate, calcium carbonate, dextrose 50%, dextrose 50%, glucagon (human recombinant), glucose, glucose, heparin (porcine), insulin aspart U-100, midodrine, ondansetron, oxyCODONE    Review of patient's allergies indicates:   Allergen Reactions    No known drug allergies      Objective:     Vital Signs (Most Recent):  Temp: 98.4 °F (36.9 °C) (05/09/18 0640)  Pulse: (!) 114 (05/09/18 1030)  Resp: 18 (05/09/18 0640)  BP: (!) 101/59 (05/09/18 1030)  SpO2: 100 % (05/09/18 0640) Vital Signs (24h Range):  Temp:  [98.2 °F (36.8 °C)-99.5 °F (37.5 °C)] 98.4 °F  (36.9 °C)  Pulse:  [112-118] 114  Resp:  [16-19] 18  SpO2:  [92 %-100 %] 100 %  BP: ()/(54-82) 101/59     Patient Vitals for the past 72 hrs (Last 3 readings):   Weight   05/09/18 0400 80.3 kg (177 lb 0.5 oz)   05/07/18 0400 77.4 kg (170 lb 10.2 oz)     Body mass index is 27.73 kg/m².    Intake/Output Summary (Last 24 hours) at 05/09/18 1057  Last data filed at 05/09/18 0600   Gross per 24 hour   Intake          1463.97 ml   Output                0 ml   Net          1463.97 ml     Physical Exam   Constitutional: He is oriented to person, place, and time. He appears well-developed and well-nourished.   Neck: Normal range of motion. Neck supple. No JVD present.   Cardiovascular: Normal rate and regular rhythm.  Exam reveals no gallop and no friction rub.    No murmur heard.  Pulmonary/Chest: Effort normal and breath sounds normal. He has no wheezes. He has no rales.   Trach in place   Abdominal: Soft. Bowel sounds are normal. He exhibits distension. There is no tenderness.   Musculoskeletal: He exhibits no edema.   Neurological: He is alert and oriented to person, place, and time.   Skin: Skin is warm and dry.   RLE- 1st and 3rd gangrenous toes     Significant Labs:  CBC:    Recent Labs  Lab 05/07/18  0600 05/08/18  0525 05/09/18  0500   WBC 6.65 5.82 6.19   RBC 2.98* 2.97* 2.98*   HGB 8.9* 8.8* 8.8*   HCT 28.1* 28.6* 28.5*   * 358* 372*   MCV 94 96 96   MCH 29.9 29.6 29.5   MCHC 31.7* 30.8* 30.9*     BNP:  No results for input(s): BNP in the last 168 hours.    Invalid input(s): BNPTRIAGELBLO  CMP:    Recent Labs  Lab 05/07/18  0600 05/08/18  0525 05/09/18  0500   * 209* 237*   CALCIUM 9.4 8.9 8.2*   ALBUMIN 2.3* 2.3* 2.0*   PROT 6.2 6.2 5.7*   * 132* 134*   K 4.8 3.6 3.7   CO2 23 25 23   CL 95 97 100   BUN 55* 30* 37*   CREATININE 3.5* 2.4* 2.9*   ALKPHOS 170* 167* 153*   ALT 11 10 8*   AST 13 11 11   BILITOT 0.4 0.4 0.3      Microbiology:  Microbiology Results (last 7 days)     Procedure  Component Value Units Date/Time    Blood culture [[id number]] Collected:  05/05/18 0644    Order Status:  Completed Specimen:  Blood from Midline, Basilic, Right Updated:  05/09/18 1012     Blood Culture, Routine No Growth to date     Blood Culture, Routine No Growth to date     Blood Culture, Routine No Growth to date     Blood Culture, Routine No Growth to date     Blood Culture, Routine No Growth to date    Blood culture [[id number]] Collected:  05/05/18 0812    Order Status:  Completed Specimen:  Blood Updated:  05/09/18 1012     Blood Culture, Routine No Growth to date     Blood Culture, Routine No Growth to date     Blood Culture, Routine No Growth to date     Blood Culture, Routine No Growth to date     Blood Culture, Routine No Growth to date    Narrative:       Collection has been rescheduled by CDP at 5/5/2018 07:40 Reason: Due   now  Collection has been rescheduled by CDP at 5/5/2018 07:40 Reason: Due   now    Respiratory Viral Panel by PCR [facility]; Nasal Swab [[id number]] Collected:  05/05/18 0644    Order Status:  Completed Specimen:  Respiratory Updated:  05/08/18 1138     Respiratory Virus Panel, source Nasal Swab     RVP - Adenovirus Not Detected     Comment: Detects Serotypes B and E. Detection of Serotype C may   be limited. If Adenovirus infection is suspected and a   Not Detected result is returned the sample should be   re-tested for Adenovirus using an independent method  (e.g. Metranome Adenovirus Quantitative Real-Time  PCR test.          Enterovirus Not Detected     Comment: Cross-reactivity has been observed between certain Rhinovirus  strains and the Enterovirus assay.          Human Bocavirus Not Detected     Human Coronavirus Not Detected     Comment: The Human Coronavirus assay detects Human coronavirus types  229E, OC43,NL63 and HKU1.          RVP - Human Metapneumovirus (hMPV) Not Detected     RVP - Influenza A Not Detected     Influenza A - H4S9-38 Not Detected     RVP -  "Influenza B Not Detected     Parainfluenza Not Detected     Respiratory Syncytial VirusVirus (RSV) A Not Detected     Comment: The Respiratory Syncytial Viral assay detects types A and B,  however it does not distinguish between the two.          RVP - Rhinovirus Not Detected     Comment: Cross-Reactivity has been observed between certain   Rhinovirus strains and the Enterovirus assay.  Target Enriched Mulitplex Polymerase Chain Reaction (TEM-PCR)  allows for the detection of multiple pathogens out of a single  reaction.  This test was developed and its performance   characteristics determined by [employer].  It has not   been cleared or approved by the U.S.Food and Drug Administration.  Results should be used in conjunction with clinical findings,   and should not form the sole basis for a diagnosis or treatment  decision.  TEM-PCR is a licensed technology of [employer].         Narrative:       Receiving Lab:->[facility]    Culture, Respiratory with Gram Stain [[id number]]     Order Status:  Canceled Specimen:  Respiratory     Blood culture [[id number]]     Order Status:  Canceled Specimen:  Blood     Culture, Respiratory with Gram Stain [[id number]]     Order Status:  Canceled Specimen:  Respiratory from Tracheal Aspirate         I have reviewed all pertinent labs within the past 24 hours.    Estimated Creatinine Clearance: 33 mL/min (A) (based on SCr of 2.9 mg/dL (H)).    Diagnostic Results:  ABD US (5/8/18): "Moderate volume ascites. Bilateral pleural effusions."    Assessment and Plan:     43 yo male S/P OHTx 11/18/2014, suspected restrictive versus constrictive CMP post-transplant as well as CKD stage IV that has resulted in ascites/pleural effusion, was getting monthly paracentesis and thoracentesis. Most recently has had ongoing issues with his lungs, had gotten 2 thoracenteses, after which he underwent VATS 01/19/18 followed by pleurex catheter placement and effusion drainage 01/11/18, " subsequently had it removed 02/07/18 after drainage had decreased despite multiple attempts to drain it. Has been having significant coughing fits that result in him being near-syncopal at times, although difficult to say if he has passed out or not- patient most recently was admitted to the hospital for increased AGUILAR, coughing and pre-syncope [place]11[place]02[place]14[place] [facility]   Patient was started on antibiotics for suspected bacterial infection, with some diarrhea, bronchitis, and possible pneumonia. Ct chest showed some pleural fluid collection and after talking with Dr. James, we arranged for him to receive a diagnostic tap with IR, from which the fluid collection demonstrated no growth or significant findings at all really. Cell counts do not appear to have been sent but will recheck. Patient states since his hospital stay, yesterday had a significant coughing fit again, after which he nearly passed out, is being seen in clinic today for this. Denies any cardiopulmonary complaints, no leg swelling, has some abdominal swelling, which is moderate for him. Denies significant shortness of breath with mild exertion, had 6MWT yesterday to see if he qualified for home O2, and his O2 sats actually improved after recovery from where he started initially. He and his wife admit he is in bed most days, not very active.     Mr. Crocker presented to the ED 3/11/18 with approximately 3 weeks of worsening diarrhea and 2-3 days of sinus pressure, drainage, and worsened cough.  He notes that he had a coughing fit last night and passed out, coughed throughout most of the night.  This also happened on 2/25/18.  He last saw Dr Ferreira in clinic on 2/20/18 where he was taken off myfortic and switched to cellcept (he hadn't been on cellcept because of leukopenia when they tried post-transplant).  Though his diarrhea had improved after his last discharge, he states it has increased now to 15x/day, clear/yellow/green, no fevers, no  exacerbating foods per say.  He was taken back off the cellcept and placed back on myfortic this past week and has not noticed immediate change in diarrhea. He also reports his baseline coughing fits havent improved and are minimally responsive to tessalon pearls.  Came on last night, he lost consciousness during a fit once which is relatively normal for him, and had two more during HPI.  He notes no sick contacts but does state he's had some URI symptoms as above.  His baseline vitals are usually -120 and BP 90s/60s-110s/70s.  He reports a history of intermittent diarrhea - did have Cdiff many years ago but this smells different.  No abdominal pain, no nausea, no vomiting.  No blood in diarrhea.  Appears last c-scope in 2015 with no evidence of infection or inflammatory processes.  He does report IBS which is different that this.       * Acute respiratory failure with hypoxia    - Resolved.   - S/P Bronch and intubation 3/14 now post tracheostomy due to inability to extubate  - Etiology = RSV   - Trach decanulation this AM per pulmonology   - Appreciate PT/OT/Wound care.        Heart transplanted    - TTE 4/24/18 showed normal graft function but has known history of restrictive CM post transplant.  - Continue Prednisone 5mg daily, Cellcept 250mg BID and Tacrolimus 5/5 with plan to adjust as needed for goal 6-10.        Abdominal distention    - Paracentesis tomorrow per IR   - Albumin PRN (if >5L removed)         Type 1 diabetes mellitus with stage 3 chronic kidney disease    - Appreciate Endocrine's recs  - Insulin gtt per endocrine recs. Adjust as needed  - Per endocrine please call when he is off tube feeds to adjust ggt and transition to rehab/outpatient regimen        Hypothyroidism due to Hashimoto's thyroiditis    - Appreciate Endocrine's recs  - Switched to PO Levothyroxine 137mcg daily        Alteration in skin integrity    - Wound care following. Local care only at this point.         ESRD (end  stage renal disease)    - Appreciate Nephrology recs.   - Continue midodrine  - HD per nephrology [facility]   - Needs to have fistula at some point to reduce risk of infection with Permacath         Restrictive cardiomyopathy    - No changes (see above)         Anemia    - Nephrology is managing ProCrit         Debility    - PT/OT/SLP daily. Recommending rehab.  - Nutrition following.   - Passed MBSS with speech. Started regular diet with thin liquids, but will continue tube feeds nocturnal until eating 75% of meals   - Needs to have PEG removed if possible.         Anxiety    - Increased Lexapro to 20mg daily  - Continue seroquel 50 QHS   - Continue .5mg xanax PRN Q8H   - Appreciate psychiatry input.             Mamadou rhodes, DO  Heart Transplant  [facility]

## 2018-05-09 NOTE — SUBJECTIVE & OBJECTIVE
Interval History: Feeling better today. Seen in [facility] 3.5 hrs duration. Tolerated 2.5 L net UF. Plan for paracentesis tomorrow.    Pulmonary aim for decannulation tomorrow.     Review of patient's allergies indicates:   Allergen Reactions    No known drug allergies      Current Facility-Administered Medications   Medication Frequency    0.9%  NaCl infusion (for blood administration) Q24H PRN    0.9%  NaCl infusion PRN    0.9%  NaCl infusion PRN    0.9%  NaCl infusion Once    acetaminophen tablet 650 mg TID    albuterol-ipratropium 2.5mg-0.5mg/3mL nebulizer solution 3 mL Q4H PRN    ALPRAZolam tablet 0.5 mg Q6H PRN    benzonatate capsule 100 mg TID PRN    calcium carbonate 200 mg calcium (500 mg) chewable tablet 500 mg BID PRN    cetirizine tablet 5 mg Daily    dextrose 50% injection 12.5 g PRN    dextrose 50% injection 25 g PRN    docusate sodium capsule 100 mg BID    epoetin jose miguel injection 8,100 Units Every Mon, Wed, Fri    escitalopram oxalate tablet 20 mg Daily    famotidine tablet 20 mg QHS    glucagon (human recombinant) injection 1 mg PRN    glucose chewable tablet 16 g PRN    glucose chewable tablet 24 g PRN    guaiFENesin 12 hr tablet 1,200 mg BID    heparin (porcine) injection 1,000 Units PRN    heparin (porcine) injection 5,000 Units Q12H    insulin aspart U-100 pen 0-5 Units QID (AC + HS) PRN    insulin aspart U-100 pen 4 Units TIDWM    insulin regular (Humulin R) 100 Units in sodium chloride 0.9% 100 mL infusion Continuous    levothyroxine tablet 137 mcg Before breakfast    midodrine tablet 10 mg TID    midodrine tablet 15 mg PRN    mycophenolate capsule 250 mg BID    ondansetron disintegrating tablet 4 mg Q6H PRN    oxyCODONE immediate release tablet 5 mg Q6H PRN    polyethylene glycol packet 17 g BID    predniSONE tablet 5 mg Daily    QUEtiapine tablet 50 mg QHS    tacrolimus capsule 5 mg BID       Objective:     Vital Signs (Most Recent):  Temp: 98.6 °F (37 °C)  (05/09/18 1135)  Pulse: (!) 115 (05/09/18 1135)  Resp: 18 (05/09/18 1135)  BP: 117/69 (05/09/18 1135)  SpO2: 95 % (05/09/18 1225)  O2 Device (Oxygen Therapy): room air (05/09/18 1225) Vital Signs (24h Range):  Temp:  [98.2 °F (36.8 °C)-99.5 °F (37.5 °C)] 98.6 °F (37 °C)  Pulse:  [111-117] 115  Resp:  [16-19] 18  SpO2:  [92 %-100 %] 95 %  BP: ()/(54-82) 117/69     Weight: 80.3 kg (177 lb 0.5 oz) (05/09/18 0400)  Body mass index is 27.73 kg/m².  Body surface area is 1.95 meters squared.    I/O last 3 completed shifts:  In: 2333.8 [P.O.:1380; I.V.:33.8; NG/GT:920]  Out: 0     Physical Exam   Constitutional: He appears well-developed. No distress.   Trach   HENT:   Head: Normocephalic and atraumatic.   Eyes: Conjunctivae and EOM are normal.   Cardiovascular: Regular rhythm.  Tachycardia present.    Pulmonary/Chest: He has no wheezes. He has no rales.   L IJ TDC   Abdominal: Soft. He exhibits no distension.   LUQ PEG  ascites   Neurological: He is alert.   Skin:   Right necrotic toes 1-3  Left great toe necrotic-same       Significant Labs:  All labs within the past 24 hours have been reviewed.     Significant Imaging:  Labs: Reviewed

## 2018-05-09 NOTE — SUBJECTIVE & OBJECTIVE
Interval History: No acute issues overnight. Planning for trach decanulation this AM after dialysis     Continuous Infusions:   insulin (HUMAN R) infusion (adults) 0.6 Units/hr (05/09/18 0805)     Scheduled Meds:   sodium chloride 0.9%   Intravenous Once    acetaminophen  650 mg Oral TID    cetirizine  5 mg Oral Daily    docusate sodium  100 mg Oral BID    epoetin jose miguel (PROCRIT) injection  100 Units/kg (Dosing Weight) Intravenous Every Mon, Wed, Fri    escitalopram oxalate  20 mg Oral Daily    famotidine  20 mg Oral QHS    guaiFENesin  1,200 mg Oral BID    heparin (porcine)  5,000 Units Subcutaneous Q12H    insulin aspart U-100  4 Units Subcutaneous TIDWM    levothyroxine  137 mcg Oral Before breakfast    midodrine  10 mg Oral TID    mycophenolate  250 mg Oral BID    polyethylene glycol  17 g Oral BID    predniSONE  5 mg Oral Daily    QUEtiapine  50 mg Oral QHS    tacrolimus  5 mg Oral BID     PRN Meds:sodium chloride, sodium chloride 0.9%, sodium chloride 0.9%, albuterol-ipratropium 2.5mg-0.5mg/3mL, ALPRAZolam, benzonatate, calcium carbonate, dextrose 50%, dextrose 50%, glucagon (human recombinant), glucose, glucose, heparin (porcine), insulin aspart U-100, midodrine, ondansetron, oxyCODONE    Review of patient's allergies indicates:   Allergen Reactions    No known drug allergies      Objective:     Vital Signs (Most Recent):  Temp: 98.4 °F (36.9 °C) (05/09/18 0640)  Pulse: (!) 114 (05/09/18 1030)  Resp: 18 (05/09/18 0640)  BP: (!) 101/59 (05/09/18 1030)  SpO2: 100 % (05/09/18 0640) Vital Signs (24h Range):  Temp:  [98.2 °F (36.8 °C)-99.5 °F (37.5 °C)] 98.4 °F (36.9 °C)  Pulse:  [112-118] 114  Resp:  [16-19] 18  SpO2:  [92 %-100 %] 100 %  BP: ()/(54-82) 101/59     Patient Vitals for the past 72 hrs (Last 3 readings):   Weight   05/09/18 0400 80.3 kg (177 lb 0.5 oz)   05/07/18 0400 77.4 kg (170 lb 10.2 oz)     Body mass index is 27.73 kg/m².    Intake/Output Summary (Last 24 hours) at  05/09/18 1057  Last data filed at 05/09/18 0600   Gross per 24 hour   Intake          1463.97 ml   Output                0 ml   Net          1463.97 ml     Physical Exam   Constitutional: He is oriented to person, place, and time. He appears well-developed and well-nourished.   Neck: Normal range of motion. Neck supple. No JVD present.   Cardiovascular: Normal rate and regular rhythm.  Exam reveals no gallop and no friction rub.    No murmur heard.  Pulmonary/Chest: Effort normal and breath sounds normal. He has no wheezes. He has no rales.   Trach in place   Abdominal: Soft. Bowel sounds are normal. He exhibits distension. There is no tenderness.   Musculoskeletal: He exhibits no edema.   Neurological: He is alert and oriented to person, place, and time.   Skin: Skin is warm and dry.   RLE- 1st and 3rd gangrenous toes     Significant Labs:  CBC:    Recent Labs  Lab 05/07/18  0600 05/08/18  0525 05/09/18  0500   WBC 6.65 5.82 6.19   RBC 2.98* 2.97* 2.98*   HGB 8.9* 8.8* 8.8*   HCT 28.1* 28.6* 28.5*   * 358* 372*   MCV 94 96 96   MCH 29.9 29.6 29.5   MCHC 31.7* 30.8* 30.9*     BNP:  No results for input(s): BNP in the last 168 hours.    Invalid input(s): BNPTRIAGELBLO  CMP:    Recent Labs  Lab 05/07/18  0600 05/08/18  0525 05/09/18  0500   * 209* 237*   CALCIUM 9.4 8.9 8.2*   ALBUMIN 2.3* 2.3* 2.0*   PROT 6.2 6.2 5.7*   * 132* 134*   K 4.8 3.6 3.7   CO2 23 25 23   CL 95 97 100   BUN 55* 30* 37*   CREATININE 3.5* 2.4* 2.9*   ALKPHOS 170* 167* 153*   ALT 11 10 8*   AST 13 11 11   BILITOT 0.4 0.4 0.3      Microbiology:  Microbiology Results (last 7 days)     Procedure Component Value Units Date/Time    Blood culture [[id number]] Collected:  05/05/18 0644    Order Status:  Completed Specimen:  Blood from Midline, Basilic, Right Updated:  05/09/18 1012     Blood Culture, Routine No Growth to date     Blood Culture, Routine No Growth to date     Blood Culture, Routine No Growth to date     Blood Culture,  Routine No Growth to date     Blood Culture, Routine No Growth to date    Blood culture [[id number]] Collected:  05/05/18 0812    Order Status:  Completed Specimen:  Blood Updated:  05/09/18 1012     Blood Culture, Routine No Growth to date     Blood Culture, Routine No Growth to date     Blood Culture, Routine No Growth to date     Blood Culture, Routine No Growth to date     Blood Culture, Routine No Growth to date    Narrative:       Collection has been rescheduled by CDP at 5/5/2018 07:40 Reason: Due   now  Collection has been rescheduled by CDP at 5/5/2018 07:40 Reason: Due   now    Respiratory Viral Panel by PCR [facility]; Nasal Swab [[id number]] Collected:  05/05/18 0644    Order Status:  Completed Specimen:  Respiratory Updated:  05/08/18 1138     Respiratory Virus Panel, source Nasal Swab     RVP - Adenovirus Not Detected     Comment: Detects Serotypes B and E. Detection of Serotype C may   be limited. If Adenovirus infection is suspected and a   Not Detected result is returned the sample should be   re-tested for Adenovirus using an independent method  (e.g. [employer] Adenovirus Quantitative Real-Time  PCR test.          Enterovirus Not Detected     Comment: Cross-reactivity has been observed between certain Rhinovirus  strains and the Enterovirus assay.          Human Bocavirus Not Detected     Human Coronavirus Not Detected     Comment: The Human Coronavirus assay detects Human coronavirus types  229E, OC43,NL63 and HKU1.          RVP - Human Metapneumovirus (hMPV) Not Detected     RVP - Influenza A Not Detected     Influenza A - [id number] Not Detected     RVP - Influenza B Not Detected     Parainfluenza Not Detected     Respiratory Syncytial VirusVirus (RSV) A Not Detected     Comment: The Respiratory Syncytial Viral assay detects types A and B,  however it does not distinguish between the two.          RVP - Rhinovirus Not Detected     Comment: Cross-Reactivity has been observed between certain  "  Rhinovirus strains and the Enterovirus assay.  Target Enriched Mulitplex Polymerase Chain Reaction (TEM-PCR)  allows for the detection of multiple pathogens out of a single  reaction.  This test was developed and its performance   characteristics determined by [employer].  It has not   been cleared or approved by the U.S.Food and Drug Administration.  Results should be used in conjunction with clinical findings,   and should not form the sole basis for a diagnosis or treatment  decision.  TEM-PCR is a licensed technology of [employer].         Narrative:       Receiving Lab:->[facility]    Culture, Respiratory with Gram Stain [[id number]]     Order Status:  Canceled Specimen:  Respiratory     Blood culture [[id number]]     Order Status:  Canceled Specimen:  Blood     Culture, Respiratory with Gram Stain [[id number]]     Order Status:  Canceled Specimen:  Respiratory from Tracheal Aspirate         I have reviewed all pertinent labs within the past 24 hours.    Estimated Creatinine Clearance: 33 mL/min (A) (based on SCr of 2.9 mg/dL (H)).    Diagnostic Results:  ABD US (5/8/18): "Moderate volume ascites. Bilateral pleural effusions."  "

## 2018-05-09 NOTE — PROGRESS NOTES
Report received from [facility]. Pt arrived from floor in own bed AAOx4. Acute dialysis initiated via L chestwall permcath without difficulty. PRN dose of midodrine given.

## 2018-05-10 PROBLEM — Z78.9 ON ENTERAL NUTRITION: Status: RESOLVED | Noted: 2018-01-01 | Resolved: 2018-01-01

## 2018-05-10 NOTE — ASSESSMENT & PLAN NOTE
BG goal 140-180,       Transition to subcutaneous insulin now that peg is removed  Start detemir 15 units daily and novolog 5 units with meals  bg monitoring ac/hs

## 2018-05-10 NOTE — PROGRESS NOTES
UPDATE    SW to pt's room for update. Pt presents as aaox3 with calm and pleasant affect. Pt reports coping well at this time. Pt states his trach and peg were removed and he is excited that he will be able to go to rehab soon. Pt requests SW call wife Elizabeth for rehab preference and states it is very important to them to be closer to home. SW called Elizabeth and confirmed she and pt would rather pt be a rehab closer to home, even if placement takes several days. Elizabeth requesting either [facility] ph [phone], fax [phone] or [facility] ph [phone], fax [phone]. SW notified team. SW providing psychosocial and counseling support, education, resources, and d/c planning as needed. SW continuing to follow and remains available.

## 2018-05-10 NOTE — PT/OT/SLP PROGRESS
Physical Therapy      Patient Name:  Lv Crocker   MRN:  [id number]    Attempted to see pt in AM - pt receiving enema; PM - pt off floor for paracentesis; unable to return later in day. Will follow-up next scheduled date.    Corey Hernández, PT   5/10/2018

## 2018-05-10 NOTE — SUBJECTIVE & OBJECTIVE
Interval History 5/10/2018:  Patient is seen for follow-up rehab evaluation and recommendations: No acute events over night.  No acute events over night.  Decannulated, PEG removed.  Participating with therapy.  Paracentesis today.     HPI, Past Medical, Family, and Social History remains the same as documented in the initial encounter.    Scheduled Medications:    sodium chloride 0.9%   Intravenous Once    acetaminophen  650 mg Oral TID    cetirizine  5 mg Oral Daily    docusate sodium  100 mg Oral BID    epoetin jose miguel (PROCRIT) injection  100 Units/kg (Dosing Weight) Intravenous Every Mon, Wed, Fri    escitalopram oxalate  20 mg Oral Daily    famotidine  20 mg Oral QHS    guaiFENesin  1,200 mg Oral BID    heparin (porcine)  5,000 Units Subcutaneous Q12H    insulin aspart U-100  5 Units Subcutaneous TIDWM    insulin detemir U-100  15 Units Subcutaneous Daily    lactulose  200 g Rectal TID    levothyroxine  137 mcg Oral Before breakfast    midodrine  10 mg Oral TID    mycophenolate  250 mg Oral BID    polyethylene glycol  17 g Oral BID    predniSONE  5 mg Oral Daily    QUEtiapine  50 mg Oral QHS    tacrolimus  5 mg Oral BID     PRN Medications: sodium chloride, sodium chloride 0.9%, sodium chloride 0.9%, albuterol-ipratropium 2.5mg-0.5mg/3mL, ALPRAZolam, benzonatate, calcium carbonate, dextrose 50%, dextrose 50%, glucagon (human recombinant), glucose, glucose, heparin (porcine), insulin aspart U-100, midodrine, ondansetron, oxyCODONE    Review of Systems   Constitutional: Positive for activity change. Negative for chills, fatigue and fever.   HENT: Negative for drooling, hearing loss, trouble swallowing and voice change.    Eyes: Negative for pain and visual disturbance.   Respiratory: Negative for cough and wheezing.    Cardiovascular: Negative for chest pain and palpitations.   Gastrointestinal: Negative for abdominal pain, nausea and vomiting.   Genitourinary: Negative for difficulty urinating  and flank pain.   Musculoskeletal: Positive for arthralgias, gait problem and myalgias. Negative for back pain and neck pain.   Skin: Positive for color change and wound. Negative for rash.   Allergic/Immunologic: Positive for immunocompromised state.   Neurological: Positive for weakness. Negative for dizziness, numbness and headaches.   Psychiatric/Behavioral: Positive for sleep disturbance (improving). Negative for agitation and hallucinations. The patient is not nervous/anxious.      Objective:     Vital Signs (Most Recent):  Temp: 98.4 °F (36.9 °C) (05/10/18 1222)  Pulse: (!) 115 (05/10/18 1222)  Resp: 18 (05/10/18 1222)  BP: 112/81 (05/10/18 1222)  SpO2: 95 % (05/10/18 1222)    Vital Signs (24h Range):  Temp:  [97.9 °F (36.6 °C)-98.8 °F (37.1 °C)] 98.4 °F (36.9 °C)  Pulse:  [108-124] 115  Resp:  [16-18] 18  SpO2:  [92 %-97 %] 95 %  BP: (104-124)/(60-81) 112/81     Physical Exam   Constitutional: He is oriented to person, place, and time. He appears well-developed. No distress.   HENT:   Head: Normocephalic and atraumatic.   Right Ear: External ear normal.   Left Ear: External ear normal.   Nose: Nose normal.   Eyes: Right eye exhibits no discharge. Left eye exhibits no discharge. No scleral icterus.   Neck: Neck supple.   Cardiovascular: Normal rate, regular rhythm and intact distal pulses.    Pulmonary/Chest: Effort normal. No respiratory distress. He has no wheezes.   Abdominal: Soft. He exhibits no distension. There is no tenderness.   Incision LETICIA, CDI   Musculoskeletal: He exhibits no edema.        Right shoulder: He exhibits decreased range of motion and tenderness.   R foot: R great toe and R 3rd toe with necrotic tissue  L foot: small amount of necrotic tissue to L great toe  Overall deconditioning   Neurological: He is alert and oriented to person, place, and time. He displays atrophy. No sensory deficit. He exhibits abnormal muscle tone.   Skin: Skin is warm and dry. Ecchymosis noted.   Psychiatric:  His behavior is normal. Thought content normal. His affect is blunt.   Vitals reviewed.    Diagnostic Results:   Labs: Reviewed  X-Ray: Reviewed  US: Reviewed  CT: Reviewed    NEUROLOGICAL EXAMINATION:     MENTAL STATUS   Oriented to person, place, and time.

## 2018-05-10 NOTE — PROCEDURES
Radiology Post-Procedure Note    Pre Op Diagnosis: Ascites  Post Op Diagnosis: Same    Procedure: Paracentesis    Procedure performed by: Guicho Vasquez MD    Written Informed Consent Obtained: Yes  Specimen Removed: YES, white-milky fluid  Estimated Blood Loss: Minimal    Findings:   Successful paracentesis.      Patient tolerated procedure well.    Delvis Christianson MD  PGY-III  Dept of Radiology   Pager: [phone]

## 2018-05-10 NOTE — PT/OT/SLP PROGRESS
"Speech Language Pathology Treatment / Discharge Summary     Patient Name:  Lv Crocker   MRN:  [id number]  Admitting Diagnosis: Acute respiratory failure with hypoxia    Recommendations:                 General Recommendations:  Follow-up not indicated  Diet recommendations:  Regular, Liquid Diet Level: Thin   Aspiration Precautions: 1 bite/sip at a time and HOB to 90 degrees   General Precautions: Standard, aspiration, fall  Communication strategies:  none    Subjective     "a lot less tubes" Pt referring to progress     Pain/Comfort:  ·  None    Objective:     Has the patient been evaluated by SLP for swallowing?   Yes  Keep patient NPO? No   Current Respiratory Status: room air      Pt seen for treatment this session. Pt s/p trach decannulation and remains with strong clear vocal quality despite audible air leak from healing stoma. Pt demonstrated ability to drink single and consecutive sips of thin liquids without use of chin tuck without any overt clinical signs of aspiration. Pt reports has inconsistently been using chin tuck strategy except when taking meds. Pt tolerated trial of regular solid. Pt appearing safe to continue baseline unrestricted diet and no longer warranting ongoing PMSV trials given decannulation. No further skilled speech services warranted at this time.     Assessment:     Lv Crocker is a 42 y.o. male with an SLP diagnosis of resolved dysphagia and trach decannulation..     Goals:    SLP Goals     Not on file          Multidisciplinary Problems (Resolved)        Problem: SLP Goal    Goal Priority Disciplines Outcome   SLP Goal   (Resolved)     SLP Outcome(s) achieved   Description:  Updated Speech Language Pathology Goals  Goals expected to be met by 5/4  1. Pt will tolerate a regular diet and thin liquids with no overt s/s of aspiration.   2. Pt will demonstrate/verbalize 3 safe swallowing strategies with min cues.   3. Educate Pt on aspiration precautions and S/S " aspiration  4. Pt and family will verbalize/demosntrate understanding of PMSV precautions and safety awareness with no cues.  5. Patient will complete dysphagia therapy exercises x10 each with min cues.     Speech Language Pathology Goals  Goals expected to be met by 4/27/18  1.  Pt will tolerate PMSV for 30 minutes w/ no change in respiratory status and fair voicing produced. -met  2.  Pt will participate in ongoing swallow assessment. -met  3. Pt will name up to 12 items/minute in a concrete category, 90% of attempts, Supervision, to improve cognitive organization. -discontinued  4. Pt will recall 3 related items post 3 minute filled delay, 90% of attempts, Supervision, to improve STM.-discontinued  5. Pt will answer m/u YNQ with 90% accuracy, Supervision, to improve higher-level comprehension. --discontinued  6. Pt will complete further assessment of reading/writing skills. -discontinued  7. Educate Pt on aspiration precautions and S/S aspiration. -ongoing  8. Educate Pt and family on PMSV precautions and safety awareness. -met; reinforcment to continue  Goals expected to be met by 4/20/18  1.  Pt will tolerate PMSV for 30 minutes w/ no change in respiratory status and fair voicing produced.  2.  Pt will complete further evaluation of cognitive-linguistic communication skills to determine the need for additional goals. Met 4/19  3. Pt will name up to 12 items/minute in a concrete category, 90% of attempts, Supervision, to improve cognitive organization  4. Pt will recall 3 related items post 3 minute filled delay, 90% of attempts, Supervision, to improve STM   5. Pt will answer m/u YNQ with 90% accuracy, Supervision, to improve higher-level comprehension   6. Pt will complete further assessment of reading/writing skills  7. Pending MD clearance, Pt will complete further assessment of swallow fx   8. Educate Pt and family on PMSV precautions and safety awareness    Goals expected to be met by 4/13/18   1.  Pt will  tolerate PMSV for 3 min w/ no change in respiratory status and fair voicing produced. Met   2.  Pt will complete further evaluation of cognitive-linguistic communication skills to determine the need for additional goals.                               Plan:     · Patient to be seen:  2 x/week   · Plan of Care expires:  05/05/18  · Plan of Care reviewed with:  patient, spouse   · SLP Follow-Up:  No       Discharge recommendations:  rehabilitation facility   Barriers to Discharge:  None per SLP discipline     Time Tracking:     SLP Treatment Date:   05/10/18  Speech Start Time:  1107  Speech Stop Time:  1123     Speech Total Time (min):  16 min    Billable Minutes: Speech Therapy Individual 8 and Treatment Swallowing Dysfunction 8    Shannen Foley CCC-SLP  05/10/2018

## 2018-05-10 NOTE — SUBJECTIVE & OBJECTIVE
"Interval HPI:   Overnight events: peg removed. Transition to subcutaneous insulin  Eatin%  Nausea: No  Hypoglycemia and intervention: No  Fever: No  TPN and/or TF: No  If yes, type of TF/TPN and rate: n/a    /61 (BP Location: Left arm, Patient Position: Lying)   Pulse (!) 124   Temp 97.9 °F (36.6 °C) (Oral)   Resp 18   Ht 5' 7" (1.702 m)   Wt 79 kg (174 lb 2.6 oz)   SpO2 (!) 92%   BMI 27.28 kg/m²     Labs Reviewed and Include      Recent Labs  Lab 05/10/18  0500   *   CALCIUM 8.6*   ALBUMIN 2.1*   PROT 5.9*      K 3.7   CO2 25      BUN 22*   CREATININE 2.1*   ALKPHOS 151*   ALT 10   AST 20   BILITOT 0.4     Lab Results   Component Value Date    WBC 6.88 05/10/2018    HGB 8.5 (L) 05/10/2018    HCT 28.9 (L) 05/10/2018     (H) 05/10/2018     (H) 05/10/2018       Recent Labs  Lab 18  0600   TSH 4.793*   FREET4 0.77     Lab Results   Component Value Date    HGBA1C 5.1 2018       Nutritional status:   Body mass index is 27.28 kg/m².  Lab Results   Component Value Date    ALBUMIN 2.1 (L) 05/10/2018    ALBUMIN 2.0 (L) 2018    ALBUMIN 2.3 (L) 2018     Lab Results   Component Value Date    PREALBUMIN 13 (L) 2018    PREALBUMIN 5 (L) 03/15/2018    PREALBUMIN 18 (L) 2015       Estimated Creatinine Clearance: 42 mL/min (A) (based on SCr of 2.1 mg/dL (H)).    Accu-Checks  Recent Labs      18   1200  18   1710  18   2124  18   0210  18   0721  18   1049  18   1727  18   2105  05/10/18   0208  05/10/18   0741   POCTGLUCOSE  124*  204*  179*  239*  275*  170*  288*  212*  171*  168*       Current Medications and/or Treatments Impacting Glycemic Control  Immunotherapy:  Immunosuppressants         Stop Route Frequency     tacrolimus capsule 5 mg      -- Oral 2 times daily     mycophenolate capsule 250 mg      -- Oral 2 times daily        Steroids:   Hormones     Start     Stop Route Frequency Ordered "    05/01/18 0900  predniSONE tablet 5 mg      -- Oral Daily 04/30/18 1513        Pressors:    Autonomic Drugs     Start     Stop Route Frequency Ordered    04/30/18 0705  midodrine tablet 15 mg      -- Oral As needed (PRN) 04/30/18 0707    04/25/18 0900  midodrine tablet 10 mg      -- Oral 3 times daily 04/25/18 0650        Hyperglycemia/Diabetes Medications: Antihyperglycemics     Start     Stop Route Frequency Ordered    05/10/18 1130  insulin aspart U-100 pen 5 Units      -- SubQ 3 times daily with meals 05/10/18 0945    05/10/18 1044  insulin aspart U-100 pen 0-5 Units      -- SubQ Before meals & nightly PRN 05/10/18 0945    05/10/18 1000  insulin detemir U-100 pen 15 Units      -- SubQ Daily 05/10/18 0945

## 2018-05-10 NOTE — PROGRESS NOTES
"[facility]  Endocrinology  Progress Note    Admit Date: 3/11/2018     Reason for Consult: abnormal TFTs    Surgical Procedure and Date: s/p heart transplant      Diabetes diagnosis year: 5yo (T1DM)     Home Diabetes Medications:    Levemir 15u qHS  Novolog 4u AC     How often checking glucose at home? 4 times daily   BG readings on regimen: 150-200s  Hypoglycemia on the regimen?  Yes, rarely - treats appropriately (light snack, glucose tab)  Missed doses on regimen?  No        Diabetes Complications include:     Hyperglycemia, Hypoglycemia  and Diabetic chronic kidney disease          Complicating diabetes co morbidities:   N/A     HPI:   Patient is a 42 y.o. male with a diagnosis of T1DM, HTN, hypothyroidism, s/p heart transplant.  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 He required serial paracentesis and thoracentesis until he underwent a VATS with pleurX catheter placement on 2018 and removed 18.       Presented to hospital secondary to diarrhea and cough.  Upon initial labs, TSH was decreased (0.101) and free T4 1.33.  Endocrinology consulted to assist in management of these labs.  He was last seen in clinic by Kamala Vázquez NP 2017.   Previous hospitalization, endocrine consulted for same problem.  During that visit, recommended decreasing LT4 to 125mcg daily. Unfortunately, patient was discharged on previous dose of 150mcg daily.     Interval HPI:   Overnight events: peg removed. Transition to subcutaneous insulin  Eatin%  Nausea: No  Hypoglycemia and intervention: No  Fever: No  TPN and/or TF: No  If yes, type of TF/TPN and rate: n/a    /61 (BP Location: Left arm, Patient Position: Lying)   Pulse (!) 124   Temp 97.9 °F (36.6 °C) (Oral)   Resp 18   Ht 5' 7" (1.702 m)   Wt 79 kg (174 lb 2.6 oz)   SpO2 (!) 92%   BMI 27.28 kg/m²       Labs Reviewed " and Include      Recent Labs  Lab 05/10/18  0500   *   CALCIUM 8.6*   ALBUMIN 2.1*   PROT 5.9*      K 3.7   CO2 25      BUN 22*   CREATININE 2.1*   ALKPHOS 151*   ALT 10   AST 20   BILITOT 0.4     Lab Results   Component Value Date    WBC 6.88 05/10/2018    HGB 8.5 (L) 05/10/2018    HCT 28.9 (L) 05/10/2018     (H) 05/10/2018     (H) 05/10/2018       Recent Labs  Lab 05/05/18  0600   TSH 4.793*   FREET4 0.77     Lab Results   Component Value Date    HGBA1C 5.1 04/05/2018       Nutritional status:   Body mass index is 27.28 kg/m².  Lab Results   Component Value Date    ALBUMIN 2.1 (L) 05/10/2018    ALBUMIN 2.0 (L) 05/09/2018    ALBUMIN 2.3 (L) 05/08/2018     Lab Results   Component Value Date    PREALBUMIN 13 (L) 04/08/2018    PREALBUMIN 5 (L) 03/15/2018    PREALBUMIN 18 (L) 03/24/2015       Estimated Creatinine Clearance: 42 mL/min (A) (based on SCr of 2.1 mg/dL (H)).    Accu-Checks  Recent Labs      05/08/18   1200  05/08/18   1710  05/08/18   2124  05/09/18   0210  05/09/18   0721  05/09/18   1049  05/09/18   1727  05/09/18   2105  05/10/18   0208  05/10/18   0741   POCTGLUCOSE  124*  204*  179*  239*  275*  170*  288*  212*  171*  168*       Current Medications and/or Treatments Impacting Glycemic Control  Immunotherapy:  Immunosuppressants         Stop Route Frequency     tacrolimus capsule 5 mg      -- Oral 2 times daily     mycophenolate capsule 250 mg      -- Oral 2 times daily        Steroids:   Hormones     Start     Stop Route Frequency Ordered    05/01/18 0900  predniSONE tablet 5 mg      -- Oral Daily 04/30/18 1513        Pressors:    Autonomic Drugs     Start     Stop Route Frequency Ordered    04/30/18 0705  midodrine tablet 15 mg      -- Oral As needed (PRN) 04/30/18 0707    04/25/18 0900  midodrine tablet 10 mg      -- Oral 3 times daily 04/25/18 0650        Hyperglycemia/Diabetes Medications: Antihyperglycemics     Start     Stop Route Frequency Ordered    05/10/18  1130  insulin aspart U-100 pen 5 Units      -- SubQ 3 times daily with meals 05/10/18 0945    05/10/18 1044  insulin aspart U-100 pen 0-5 Units      -- SubQ Before meals & nightly PRN 05/10/18 0945    05/10/18 1000  insulin detemir U-100 pen 15 Units      -- SubQ Daily 05/10/18 0945          ASSESSMENT and PLAN    Type 1 diabetes mellitus with stage 3 chronic kidney disease    BG goal 140-180,       Transition to subcutaneous insulin now that peg is removed  Start detemir 15 units daily and novolog 5 units with meals  bg monitoring ac/hs              Hypothyroidism due to Hashimoto's thyroiditis    Continue LT4 137 mcg/day   Recommend rechecking tsh in 4-6 weeks        Current chronic use of systemic steroids    Can increase insulin requirement        ESRD (end stage renal disease)    Titrate cautiously.  Caution with insulin stacking.  Avoid hypoglycemia.        Heart transplanted    Per transplant  Avoid hypoglycemia  On PDN and prograf - could lead to prandial elevations and insulin resistance.            Ankur Keith MD  Endocrinology  [facility]

## 2018-05-10 NOTE — PROGRESS NOTES
"[facility]  Podiatry  Progress Note    Patient Name: Lv Crocker  MRN: [id number]  Admission Date: 3/11/2018  Hospital Length of Stay: 59 days  Attending Physician: Jose A Lloyd MD  Primary Care Provider: Philippe Mohr MD   Interval Hx: Patient seen at bedside. No complaints at this time.      Scheduled Meds:   sodium chloride 0.9%   Intravenous Once    sodium chloride 0.9%   Intravenous Once    acetaminophen  650 mg Oral TID    cetirizine  5 mg Oral Daily    docusate sodium  100 mg Oral BID    epoetin jose miguel (PROCRIT) injection  100 Units/kg (Dosing Weight) Intravenous Every Mon, Wed, Fri    escitalopram oxalate  20 mg Oral Daily    famotidine  20 mg Oral QHS    guaiFENesin  1,200 mg Oral BID    heparin (porcine)  5,000 Units Subcutaneous Q12H    insulin aspart U-100  5 Units Subcutaneous TIDWM    insulin detemir U-100  15 Units Subcutaneous Daily    lactulose  200 g Rectal TID    levothyroxine  137 mcg Oral Before breakfast    midodrine  10 mg Oral TID    mycophenolate  250 mg Oral BID    polyethylene glycol  17 g Oral BID    predniSONE  5 mg Oral Daily    QUEtiapine  50 mg Oral QHS    tacrolimus  5 mg Oral BID     Continuous Infusions:    PRN Meds:sodium chloride, sodium chloride 0.9%, sodium chloride 0.9%, sodium chloride 0.9%, albuterol-ipratropium 2.5mg-0.5mg/3mL, ALPRAZolam, benzonatate, calcium carbonate, dextrose 50%, dextrose 50%, glucagon (human recombinant), glucose, glucose, heparin (porcine), insulin aspart U-100, midodrine, ondansetron, oxyCODONE    Review of patient's allergies indicates:   Allergen Reactions    No known drug allergies         Past Medical History:   Diagnosis Date    Anxiety     Arthritis     Ascites     Thought to be 2/2 heart tpx; liver bx 3/31/17 w/o significant fibrosis    Cardiomyopathy     Depression     Controlled "for the most part" on Lexipro    Encounter for blood transfusion     during transplant    GERD " (gastroesophageal reflux disease)     Hashimoto's disease     History of CHF (congestive heart failure)     Previously EF 20% (prior to heart transplant); last EF 60%, PAP 41 as of 12/12/17; throught to be 2/2 genetics & DM1    History of coronary artery disease     H/o Coronary artery disease; resolved since heart transplant 2014    History of myocardial infarction     h/o MI x3 prior to heart transplant    HLD (hyperlipidemia) 6/11/2015    Hx of psychiatric care     Hypoglycemia unawareness in type 1 diabetes mellitus     Hypothyroid     Initially hyperthyroid 2/2 Hoshimoto's thyroiditis; now on levothyroxine 150 mcg qd    Pleural effusion due to another disorder     Thought to be 2/2 heart tpx; s/p PleuRx catheter placement and removal after 1-2 months    Psychiatric problem     Pulmonary hypertension assoc with unclear multi-factorial mechanisms 12/12/2017    PAP 41 (EF 60%) on ECHO 12/12/17    Syncope 6/30/2015    Type I diabetes mellitus, well controlled     Well controlled; Dx'd @5y/o; on N insulin 20U BID & Humalog 10U w/meals +SSI; Glu 89 11/9/17; A1c 7.2% 4/5/17    Unspecified essential hypertension 05/29/2014    Well controlled; Last /57 on 11/9/17     Past Surgical History:   Procedure Laterality Date    CARDIAC CATHETERIZATION      CARDIAC SURGERY      CHOLECYSTECTOMY      COLONOSCOPY N/A 3/13/2018    Procedure: COLONOSCOPY;  Surgeon: Tim Spencer MD;  Location: [facility] ([address] [place] [address]);  Service: Endoscopy;  Laterality: N/A;    CORONARY ANGIOPLASTY      FOOT SPLIT TRANSFER OF THE POSTERIOR TIBIALIS TENDON PROCEDURE      HEART TRANSPLANT  2014    KNEE SURGERY      PleuRx catheter placement  01/11/2018    Plan for removal after 1-2 months by Dr. James in cardiothoracic surgery    s/p oht  November 2014    stent placemnet         Family History     Problem Relation (Age of Onset)    Cancer Brother (50)    Diabetes Mother, Father, Brother, Son, Sister, Maternal Uncle,  Paternal Aunt, Maternal Grandmother, Maternal Grandfather    Heart disease Mother, Brother    Hypertension Mother, Father, Brother    Kidney disease Mother, Father    Stroke Father, Brother    Vision loss Brother        Social History Main Topics    Smoking status: Never Smoker    Smokeless tobacco: Never Used    Alcohol use No    Drug use: No    Sexual activity: Yes     Partners: Female     Review of Systems   Skin: Positive for color change, pallor and wound.     Objective:     Vital Signs (Most Recent):  Temp: 98.4 °F (36.9 °C) (05/10/18 1222)  Pulse: 109 (05/10/18 1538)  Resp: 18 (05/10/18 1406)  BP: 125/88 (05/10/18 1406)  SpO2: 97 % (05/10/18 1406) Vital Signs (24h Range):  Temp:  [97.9 °F (36.6 °C)-98.8 °F (37.1 °C)] 98.4 °F (36.9 °C)  Pulse:  [108-124] 109  Resp:  [16-18] 18  SpO2:  [92 %-97 %] 97 %  BP: (104-125)/(60-88) 125/88     Weight: 79 kg (174 lb 2.6 oz)  Body mass index is 27.28 kg/m².    Foot Exam    General  General Appearance: appears stated age and healthy   Affect: appropriate       Right Foot/Ankle     Inspection and Palpation  Tenderness: great toe metatarsophalangeal joint   Skin Exam: skin changes and abnormal color;     Neurovascular  Dorsalis pedis: 1+  Posterior tibial: 1+  Saphenous nerve sensation: diminished  Tibial nerve sensation: diminished  Superficial peroneal nerve sensation: diminished  Deep peroneal nerve sensation: diminished  Sural nerve sensation: diminished      Left Foot/Ankle      Inspection and Palpation  Tenderness: great toe interphalangeal joint and great toe metatarsophalangeal joint   Skin Exam: skin changes;     Neurovascular  Dorsalis pedis: 1+  Posterior tibial: 1+  Saphenous nerve sensation: diminished  Tibial nerve sensation: diminished  Superficial peroneal nerve sensation: diminished  Deep peroneal nerve sensation: diminished  Sural nerve sensation: diminished          Gangrene noted to hallux and 3rd toe. Stable no purulent drainage noted. Ischemic  changes noted to right second toe. Pain upon palpation to right hallux and 3rd toe.                           Mild ischemic changes noted to left hallux. CFT <3s. No purulent drainage noted.     Left foot.                           Laboratory:  CBC:     Recent Labs  Lab 05/10/18  0500   WBC 6.88   RBC 2.90*   HGB 8.5*   HCT 28.9*   *   *   MCH 29.3   MCHC 29.4*     CMP:     Recent Labs  Lab 05/10/18  0500   *   CALCIUM 8.6*   ALBUMIN 2.1*   PROT 5.9*      K 3.7   CO2 25      BUN 22*   CREATININE 2.1*   ALKPHOS 151*   ALT 10   AST 20   BILITOT 0.4       Diagnostic Results:  Xray: Left: No fracture dislocation bone destruction seen.  There is mild DJD.  There are vascular changes suggesting diabetes.  There are spurs on the calcaneus.    Right: There is hardware consistent with talocalcaneal arthrodesis.  There are vascular calcification suggesting diabetes.  No fracture dislocation bone destruction seen.    Clinical Findings:  Dry stable gangrene noted to right hallux and 3rd toe. Ischemic changes noted to right 2nd and left hallux.     Assessment/Plan:            Gangrene     Lv Crocker is a 42 y.o. 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 Ischemic changes to right second toe and left hallux.   -SULMA's within normal limits  -x-ray not concerning for infection  -Painted with betadine, nursing to paint toes with betadine daily  -Per transplant service, recommend against surgical intervention due to inmunosupressed state.  Agree with transplant service.  Toes stable, no surgical intervention indicated at this time.      DC instructions: patient to follow up with podiatry within one week of discharge.  Patient to have toes painted with betadine daily.     Weightbearing Status: WBAT B/L  Offloading Device: DARCO shoe.                 Type 1 diabetes mellitus with stage 3 chronic kidney disease     Per primary           Heart transplanted     Per primary          Kari Brown MD  Podiatry  [facility]

## 2018-05-10 NOTE — PROGRESS NOTES
[facility]  Physical Medicine & Rehab  Progress Note    Patient Name: Lv Crocker  MRN: [id number]  Admission Date: 3/11/2018  Length of Stay: 59 days  Attending Physician: Jose A Lloyd MD    Subjective:     Principal Problem:Acute respiratory failure with hypoxia    Hospital Course:   3/24/18:  Evaluated by PT and OT.  Bed mobility and transfers deferred 2/2 intubation and CRRT.   3/26/18:  Participated with PT.  Bed mobility totalA-dependent.  EOB totalA.  3/28/18:  Participated with PT and OT.  Bed mobility totalA-dependent.  ADLs totalA.   4/16/18:  Participated with PT and OT.  Bed mobility max-totalA/dependent.  Sit to stand totalA and transfers totalA.  EOB mod-totalA.  UBD totalA and LBD totalA.  4/17/18:  Participated with PT and SLP.  Bed mobility total-dependent.  EOB x 15 minutes mod-maxA.   4/18/18:  Participated with OT.  Bed mobility totalA.  EOB x 15 minutes totalA.    4/19/18:  Participated with PT, OT, and SLP.  Found to have cognitive-linguistic impairment and dysphonia.  Remains NPO.  Bed mobility total/dependent.  EOB x 24 minutes (static sit x ~10 seconds + ~5 seconds SV) mod-maxA.  No sit to stand, transfers, or gait.  ADLs totalA.   4/22/18:  Participated with PT and OT.  Bed mobility totalA.  EOB maxA.  Sit to stand max-totalA and transfers max-totalA.  LBD maxA.  4/23/18:  No PT or OT 2/2 HD.   4/24/18:  Participated with PT.  Bed mobility maxA.  Sit to stand and transfers maxA.  EOB maxA.   4/25/18:  Participated with OT.  Bed mobility totalA.  Bed to medi chair transfer dependent (totalA x 2).  EOB x 20 minutes maxA.  ADLs totalA.    4/26/18:  Passed MBSS.  SLP recommendation: regular diet and thin liquids (with a chin tuck in isolation of food).  Participated with PT and OT.  Bed mobility totalA.  Declined OOB activty with therapy 2/2 waiting on family to eat and wanted to be in bed for meal.  Medi chair in afternoon.  4/27/18:  Participated with PT, OT,  and SLP.  Bed mobility totalA-dependent.  Sit to stand totalA-dependent.  EOB SBA-maxA.  Standing balance totalA.   4/28/18:  Participated with PT.  Bed mobility totalA-dependent.    4/30/18:  Participated with PT and OT.  Bed mobility totalA (maxA x 2).  Declined EOB 2/2 pain and fatigue from HD.  UBD totalA and LBD totalA.    5/1/18:  Participated with PT, OT, and SLP.  Bed mobility max-totalA.  EOB SV-Carmina.  Sit to stand totalA (maxA x 2) and transfers dependent (totalA x 2).  UBD totalA.  5/2/18:  Participated with PT and OT.  Bed mobility max-dependent (totalA x 2).  EOB CGA-modA.  Sit to stand totalA (maxA x 2).  Stood x 25-30 seconds totalA (maxA x 2).  LBD totalA.  5/3/18:  Participated with PT and OT.  Bed mobility max-totalA.  Sit to stand and transfers max-totalA.  Ambulated 3-4 steps totalA (maxA x 2).  UBD maxA and LBD totalA.   5/7/18:  Refused PT and OT 2/2 fatigue.    5/8/18:  Participated with PT, OT, and SLP.  Bed mobility maxA.  Sit to stand totalA (maxA x 2) and transfers totalA (maxA x 2).  UBD totalA and LBD totalA.  5/9/18:  No PT or OT.  Decannulated.     Interval History 5/10/2018:  Patient is seen for follow-up rehab evaluation and recommendations: No acute events over night.  No acute events over night.  Decannulated, PEG removed.  Participating with therapy.  Paracentesis today.     HPI, Past Medical, Family, and Social History remains the same as documented in the initial encounter.    Scheduled Medications:    sodium chloride 0.9%   Intravenous Once    acetaminophen  650 mg Oral TID    cetirizine  5 mg Oral Daily    docusate sodium  100 mg Oral BID    epoetin jose miguel (PROCRIT) injection  100 Units/kg (Dosing Weight) Intravenous Every Mon, Wed, Fri    escitalopram oxalate  20 mg Oral Daily    famotidine  20 mg Oral QHS    guaiFENesin  1,200 mg Oral BID    heparin (porcine)  5,000 Units Subcutaneous Q12H    insulin aspart U-100  5 Units Subcutaneous TIDWM    insulin detemir U-100   15 Units Subcutaneous Daily    lactulose  200 g Rectal TID    levothyroxine  137 mcg Oral Before breakfast    midodrine  10 mg Oral TID    mycophenolate  250 mg Oral BID    polyethylene glycol  17 g Oral BID    predniSONE  5 mg Oral Daily    QUEtiapine  50 mg Oral QHS    tacrolimus  5 mg Oral BID     PRN Medications: sodium chloride, sodium chloride 0.9%, sodium chloride 0.9%, albuterol-ipratropium 2.5mg-0.5mg/3mL, ALPRAZolam, benzonatate, calcium carbonate, dextrose 50%, dextrose 50%, glucagon (human recombinant), glucose, glucose, heparin (porcine), insulin aspart U-100, midodrine, ondansetron, oxyCODONE    Review of Systems   Constitutional: Positive for activity change. Negative for chills, fatigue and fever.   HENT: Negative for drooling, hearing loss, trouble swallowing and voice change.    Eyes: Negative for pain and visual disturbance.   Respiratory: Negative for cough and wheezing.    Cardiovascular: Negative for chest pain and palpitations.   Gastrointestinal: Negative for abdominal pain, nausea and vomiting.   Genitourinary: Negative for difficulty urinating and flank pain.   Musculoskeletal: Positive for arthralgias, gait problem and myalgias. Negative for back pain and neck pain.   Skin: Positive for color change and wound. Negative for rash.   Allergic/Immunologic: Positive for immunocompromised state.   Neurological: Positive for weakness. Negative for dizziness, numbness and headaches.   Psychiatric/Behavioral: Positive for sleep disturbance (improving). Negative for agitation and hallucinations. The patient is not nervous/anxious.      Objective:     Vital Signs (Most Recent):  Temp: 98.4 °F (36.9 °C) (05/10/18 1222)  Pulse: (!) 115 (05/10/18 1222)  Resp: 18 (05/10/18 1222)  BP: 112/81 (05/10/18 1222)  SpO2: 95 % (05/10/18 1222)    Vital Signs (24h Range):  Temp:  [97.9 °F (36.6 °C)-98.8 °F (37.1 °C)] 98.4 °F (36.9 °C)  Pulse:  [108-124] 115  Resp:  [16-18] 18  SpO2:  [92 %-97 %] 95 %  BP:  (104-124)/(60-81) 112/81     Physical Exam   Constitutional: He is oriented to person, place, and time. He appears well-developed. No distress.   HENT:   Head: Normocephalic and atraumatic.   Right Ear: External ear normal.   Left Ear: External ear normal.   Nose: Nose normal.   Eyes: Right eye exhibits no discharge. Left eye exhibits no discharge. No scleral icterus.   Neck: Neck supple.   Cardiovascular: Normal rate, regular rhythm and intact distal pulses.    Pulmonary/Chest: Effort normal. No respiratory distress. He has no wheezes.   Abdominal: Soft. He exhibits no distension. There is no tenderness.   Incision LETICIA, CDI   Musculoskeletal: He exhibits no edema.        Right shoulder: He exhibits decreased range of motion and tenderness.   R foot: R great toe and R 3rd toe with necrotic tissue  L foot: small amount of necrotic tissue to L great toe  Overall deconditioning   Neurological: He is alert and oriented to person, place, and time. He displays atrophy. No sensory deficit. He exhibits abnormal muscle tone.   Skin: Skin is warm and dry. Ecchymosis noted.   Psychiatric: His behavior is normal. Thought content normal. His affect is blunt.   Vitals reviewed.    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 Ischemic changes to right second toe and left hallux  -  Wound care following  -  Podiatry following         Severe malnutrition    -  S/p PEG placement on 4/4/18  -  On continuous TF        Type 1 diabetes mellitus with stage 3 chronic kidney disease    -  Endocrine following  -  On insulin gtt        Heart transplanted    -  S/p OHTX 11/18/2014 with early post-op course  complicated by hemorrhagic tamponade s/p  washout, delayed closure, pericardial window with subsequent a-fib with RVR and CACHORRO and late course complicated by ABMR (in 4/2015 s/p rituxan, PLEX, and IVIG), C.diff/CMV reactivation, CKD, and restrictive cardiomyopathy with subsequent chronic/recurrent pleural effusions and ascites previously requiring monthly paracentesis and thoracentesis until he underwent VATS with pleurX catheter placement on 1/11/2018 with removal on 2/7/2018        Debility    -  2/2 prolonged and acute hospital course  -  (I) ADLs and mobility prior to admission, limited by fatigue/endurance  Recommendations  -  Encourage mobility, OOB in chair, and early ambulation as appropriate  -  PT/OT evaluate and treat  -  Pain management  -  Monitor for and prevent skin breakdown and pressure ulcers  · Early mobility, repositioning/weight shifting every 20-30 minutes when sitting, turn patient every 2 hours, proper mattress/overlay and chair cushioning, pressure relief/heel protector boots  -  DVT prophylaxis:  MARK, SCD        Anxiety    -  Anxiety and sleeping difficulty   -  Psych consulted--> increased Lexapro to 20 mg, started Seroquel--> stopped xanax and nortriptyline 2/2 hallucinations        Approved for [facility] when medically ready for discharge.     Pinky Garcia, RENETTA  Department of Physical Medicine & Rehab   [facility]

## 2018-05-10 NOTE — ASSESSMENT & PLAN NOTE
- Paracentesis today.   - Surgery to remove peg tube today given risk of infection with ascites  - Albumin PRN (if >5L removed)

## 2018-05-10 NOTE — ASSESSMENT & PLAN NOTE
Nutrition Problem  Increased nutrient needs    Related to (etiology):   Increased demand for protein for healing     Signs and Symptoms (as evidenced by):   Estimated intake of foods containing needed nutrient less than estimated requirements    Nutrition Diagnosis Status:   New

## 2018-05-10 NOTE — ASSESSMENT & PLAN NOTE
- PT/OT/SLP daily. Recommending rehab.  - Nutrition following.   - Passed MBSS with speech.  - Regular diet with thin liquids,   - Peg tube to be removed today. Will stop tube feeds

## 2018-05-10 NOTE — PROGRESS NOTES
Paracentesis complete. 300mLs peritoneal fluid drained. Pt tolerated well. Dressing to left abd clean, dry, and intact.  Specimens sent per lab order. Report called to floor nurse.

## 2018-05-10 NOTE — PLAN OF CARE
Problem: SLP Goal  Goal: SLP Goal  Updated Speech Language Pathology Goals  Goals expected to be met by 5/4  1. Pt will tolerate a regular diet and thin liquids with no overt s/s of aspiration.   2. Pt will demonstrate/verbalize 3 safe swallowing strategies with min cues.   3. Educate Pt on aspiration precautions and S/S aspiration  4. Pt and family will verbalize/demosntrate understanding of PMSV precautions and safety awareness with no cues.  5. Patient will complete dysphagia therapy exercises x10 each with min cues.     Speech Language Pathology Goals  Goals expected to be met by 4/27/18  1.  Pt will tolerate PMSV for 30 minutes w/ no change in respiratory status and fair voicing produced. -met  2.  Pt will participate in ongoing swallow assessment. -met  3. Pt will name up to 12 items/minute in a concrete category, 90% of attempts, Supervision, to improve cognitive organization. -discontinued  4. Pt will recall 3 related items post 3 minute filled delay, 90% of attempts, Supervision, to improve STM.-discontinued  5. Pt will answer m/u YNQ with 90% accuracy, Supervision, to improve higher-level comprehension. --discontinued  6. Pt will complete further assessment of reading/writing skills. -discontinued  7. Educate Pt on aspiration precautions and S/S aspiration. -ongoing  8. Educate Pt and family on PMSV precautions and safety awareness. -met; reinforcment to continue  Goals expected to be met by 4/20/18  1.  Pt will tolerate PMSV for 30 minutes w/ no change in respiratory status and fair voicing produced.  2.  Pt will complete further evaluation of cognitive-linguistic communication skills to determine the need for additional goals. Met 4/19  3. Pt will name up to 12 items/minute in a concrete category, 90% of attempts, Supervision, to improve cognitive organization  4. Pt will recall 3 related items post 3 minute filled delay, 90% of attempts, Supervision, to improve STM   5. Pt will answer m/u YNQ with 90%  accuracy, Supervision, to improve higher-level comprehension   6. Pt will complete further assessment of reading/writing skills  7. Pending MD clearance, Pt will complete further assessment of swallow fx   8. Educate Pt and family on PMSV precautions and safety awareness    Goals expected to be met by 4/13/18   1.  Pt will tolerate PMSV for 3 min w/ no change in respiratory status and fair voicing produced. Met   2.  Pt will complete further evaluation of cognitive-linguistic communication skills to determine the need for additional goals.              Outcome: Outcome(s) achieved Date Met: 05/10/18    Pt goals achieved     Shannen oFley MS, CCC-SLP  Speech Language Pathologist  Pager: [phone]  Date 5/10/2018

## 2018-05-10 NOTE — ASSESSMENT & PLAN NOTE
- Resolved. Now with trach decannulation  - Stoma care / education provided by pulmonology   - S/P Bronch and intubation 3/14 now post tracheostomy due to inability to extubate  - Etiology = RSV   - Appreciate PT/OT/Wound care.

## 2018-05-10 NOTE — PROGRESS NOTES
" [facility]  Adult Nutrition  Progress Note    SUMMARY       Recommendations    Recommendation/Intervention: Pt appetite and intake greatly improved.   -Recommend DM 2000 calorie diet and Boost Glucose Control TID.   -RD following    Goals: Pt to consume and tolerate at least 50-75% of meals within 4 days.  Nutrition Goal Status: new  Communication of RD Recs:  (POC)    Reason for Assessment    Reason for Assessment: RD follow-up  Diagnosis: other (see comments) (Resp. fx)  Relevant Medical History: Hashimoto's disease, GERD, HLD, T1DM, HTN, CHF, Heart tx (2014)  Interdisciplinary Rounds: did not attend    General Information Comments: Fair appetite and pt reports consuming 75% of breakfast and 50% of lunch today. Pt no longer on TF regimen and both PEG and trach tube were removed this morning. Pt reports feeling nausea last night but during assessment. Scheduled for paracentesis today.    Nutrition Discharge Planning: Recommend diabetic diet upon d/c.    Nutrition Risk Screen    Nutrition Risk Screen: large or nonhealing wound, burn or pressure ulcer    Nutrition/Diet History    Patient Reported Diet/Restrictions/Preferences: general  Do you have any cultural, spiritual, Presybeterian conflicts, given your current situation?: none  Food Allergies: NKFA  Factors Affecting Nutritional Intake: altered gastrointestinal function (TF discontinued today; patient reports abdomen feels "heavy")    Anthropometrics    Temp: 98.4 °F (36.9 °C)  Height Method: Stated  Height: 5' 7" (170.2 cm)  Height (inches): 67 in  Weight Method: Bed Scale  Weight: 79 kg (174 lb 2.6 oz)  Weight (lb): 174.17 lb  Ideal Body Weight (IBW), Male: 148 lb  % Ideal Body Weight, Male (lb): 117.68 lb  BMI (Calculated): 27.3  BMI Grade: 25 - 29.9 - overweight  Usual Body Weight (UBW), kg:  (HAO)       Lab/Procedures/Meds    Pertinent Labs Reviewed: reviewed  Pertinent Labs Comments: , BUN 22, Cr 2.1, Ca 8.6, Alb 2.1, Alk Phos 151, " GFR 37.2  Pertinent Medications Reviewed: reviewed  Pertinent Medications Comments: Docusate sodium, Famotidine, Heparin, Insulin, Lactulose    Physical Findings/Assessment    Overall Physical Appearance: loss of muscle mass, weak  Tubes: gastrostomy tube  Oral/Mouth Cavity: WDL  Skin: incision(s)    Estimated/Assessed Needs    Weight Used For Calorie Calculations: 79 kg (174 lb 2.6 oz)  Energy Calorie Requirements (kcal): 1967 kcal/day  Energy Need Method: [place]-St Jeor (x 1.2)  Protein Requirements:  g/day (1.2-1.4 g/kg)  Weight Used For Protein Calculations: 79 kg (174 lb 2.6 oz)  Fluid Requirements (mL): 80-94  Fluid Need Method: other (see comments) (Per MD or 1 mL/kcal)  RDA Method (mL): 1967  CHO Requirement: 50% total kcals      Nutrition Prescription Ordered    Current Diet Order: Regular  Current Nutrition Support Formula Ordered: Discontinued  Current Nutrition Support Rate Ordered: 0 (ml)  Current Nutrition Support Frequency Ordered: mL/hr 8pm-8am      Evaluation of Received Nutrient/Fluid Intake    Energy Calories Required: meeting needs  Protein Required: meeting needs  Fluid Required: meeting needs  Comments: BM 5/10  Tolerance: tolerating  % Intake of Estimated Energy Needs: 50-75%  % Meal Intake: 50%    Nutrition Risk    Level of Risk/Frequency of Follow-up:  (FU 1x/week)     Assessment and Plan    * Acute respiratory failure with hypoxia      Nutrition Problem  Increased nutrient needs    Related to (etiology):   Increased demand for protein for healing     Signs and Symptoms (as evidenced by):   Meeting <75% EEN/EPN    Nutrition Diagnosis Status:   Improving                 Monitor and Evaluation    Food and Nutrient Intake: energy intake, food and beverage intake  Food and Nutrient Adminstration: diet order  Knowledge/Beliefs/Attitudes: food and nutrition knowledge/skill, beliefs and attitudes  Physical Activity and Function: nutrition-related ADLs and IADLs  Anthropometric Measurements:  weight, weight change  Biochemical Data, Medical Tests and Procedures: electrolyte and renal panel, gastrointestinal profile, glucose/endocrine profile, inflammatory profile, lipid profile  Nutrition-Focused Physical Findings: overall appearance     Nutrition Follow-Up    RD Follow-up?: Yes    Documented by Jazmine Silva Dietetic Intern

## 2018-05-10 NOTE — PROGRESS NOTES
Palmer balloon deflated and G tube removed without complication at request of primary team  Dress site as needed  Call with questions

## 2018-05-10 NOTE — SUBJECTIVE & OBJECTIVE
"Interval Hx: Patient seen at bedside. No complaints at this time.      Scheduled Meds:   sodium chloride 0.9%   Intravenous Once    sodium chloride 0.9%   Intravenous Once    acetaminophen  650 mg Oral TID    cetirizine  5 mg Oral Daily    docusate sodium  100 mg Oral BID    epoetin jose miguel (PROCRIT) injection  100 Units/kg (Dosing Weight) Intravenous Every Mon, Wed, Fri    escitalopram oxalate  20 mg Oral Daily    famotidine  20 mg Oral QHS    guaiFENesin  1,200 mg Oral BID    heparin (porcine)  5,000 Units Subcutaneous Q12H    insulin aspart U-100  5 Units Subcutaneous TIDWM    insulin detemir U-100  15 Units Subcutaneous Daily    lactulose  200 g Rectal TID    levothyroxine  137 mcg Oral Before breakfast    midodrine  10 mg Oral TID    mycophenolate  250 mg Oral BID    polyethylene glycol  17 g Oral BID    predniSONE  5 mg Oral Daily    QUEtiapine  50 mg Oral QHS    tacrolimus  5 mg Oral BID     Continuous Infusions:    PRN Meds:sodium chloride, sodium chloride 0.9%, sodium chloride 0.9%, sodium chloride 0.9%, albuterol-ipratropium 2.5mg-0.5mg/3mL, ALPRAZolam, benzonatate, calcium carbonate, dextrose 50%, dextrose 50%, glucagon (human recombinant), glucose, glucose, heparin (porcine), insulin aspart U-100, midodrine, ondansetron, oxyCODONE    Review of patient's allergies indicates:   Allergen Reactions    No known drug allergies         Past Medical History:   Diagnosis Date    Anxiety     Arthritis     Ascites     Thought to be 2/2 heart tpx; liver bx 3/31/17 w/o significant fibrosis    Cardiomyopathy     Depression     Controlled "for the most part" on Lexipro    Encounter for blood transfusion     during transplant    GERD (gastroesophageal reflux disease)     Hashimoto's disease     History of CHF (congestive heart failure)     Previously EF 20% (prior to heart transplant); last EF 60%, PAP 41 as of 12/12/17; throught to be 2/2 genetics & DM1    History of coronary artery disease  "    H/o Coronary artery disease; resolved since heart transplant 2014    History of myocardial infarction     h/o MI x3 prior to heart transplant    HLD (hyperlipidemia) 6/11/2015    Hx of psychiatric care     Hypoglycemia unawareness in type 1 diabetes mellitus     Hypothyroid     Initially hyperthyroid 2/2 Hoshimoto's thyroiditis; now on levothyroxine 150 mcg qd    Pleural effusion due to another disorder     Thought to be 2/2 heart tpx; s/p PleuRx catheter placement and removal after 1-2 months    Psychiatric problem     Pulmonary hypertension assoc with unclear multi-factorial mechanisms 12/12/2017    PAP 41 (EF 60%) on ECHO 12/12/17    Syncope 6/30/2015    Type I diabetes mellitus, well controlled     Well controlled; Dx'd @7y/o; on N insulin 20U BID & Humalog 10U w/meals +SSI; Glu 89 11/9/17; A1c 7.2% 4/5/17    Unspecified essential hypertension 05/29/2014    Well controlled; Last /57 on 11/9/17     Past Surgical History:   Procedure Laterality Date    CARDIAC CATHETERIZATION      CARDIAC SURGERY      CHOLECYSTECTOMY      COLONOSCOPY N/A 3/13/2018    Procedure: COLONOSCOPY;  Surgeon: Tim Spencer MD;  Location: [facility] ENDO ([address] [place] [address]);  Service: Endoscopy;  Laterality: N/A;    CORONARY ANGIOPLASTY      FOOT SPLIT TRANSFER OF THE POSTERIOR TIBIALIS TENDON PROCEDURE      HEART TRANSPLANT  2014    KNEE SURGERY      PleuRx catheter placement  01/11/2018    Plan for removal after 1-2 months by Dr. James in cardiothoracic surgery    s/p oht  November 2014    stent placemnet         Family History     Problem Relation (Age of Onset)    Cancer Brother (50)    Diabetes Mother, Father, Brother, Son, Sister, Maternal Uncle, Paternal Aunt, Maternal Grandmother, Maternal Grandfather    Heart disease Mother, Brother    Hypertension Mother, Father, Brother    Kidney disease Mother, Father    Stroke Father, Brother    Vision loss Brother        Social History Main Topics    Smoking status:  Never Smoker    Smokeless tobacco: Never Used    Alcohol use No    Drug use: No    Sexual activity: Yes     Partners: Female     Review of Systems   Skin: Positive for color change, pallor and wound.     Objective:     Vital Signs (Most Recent):  Temp: 98.4 °F (36.9 °C) (05/10/18 1222)  Pulse: 109 (05/10/18 1538)  Resp: 18 (05/10/18 1406)  BP: 125/88 (05/10/18 1406)  SpO2: 97 % (05/10/18 1406) Vital Signs (24h Range):  Temp:  [97.9 °F (36.6 °C)-98.8 °F (37.1 °C)] 98.4 °F (36.9 °C)  Pulse:  [108-124] 109  Resp:  [16-18] 18  SpO2:  [92 %-97 %] 97 %  BP: (104-125)/(60-88) 125/88     Weight: 79 kg (174 lb 2.6 oz)  Body mass index is 27.28 kg/m².    Foot Exam    General  General Appearance: appears stated age and healthy   Affect: appropriate       Right Foot/Ankle     Inspection and Palpation  Tenderness: great toe metatarsophalangeal joint   Skin Exam: skin changes and abnormal color;     Neurovascular  Dorsalis pedis: 1+  Posterior tibial: 1+  Saphenous nerve sensation: diminished  Tibial nerve sensation: diminished  Superficial peroneal nerve sensation: diminished  Deep peroneal nerve sensation: diminished  Sural nerve sensation: diminished      Left Foot/Ankle      Inspection and Palpation  Tenderness: great toe interphalangeal joint and great toe metatarsophalangeal joint   Skin Exam: skin changes;     Neurovascular  Dorsalis pedis: 1+  Posterior tibial: 1+  Saphenous nerve sensation: diminished  Tibial nerve sensation: diminished  Superficial peroneal nerve sensation: diminished  Deep peroneal nerve sensation: diminished  Sural nerve sensation: diminished          Gangrene noted to hallux and 3rd toe. Stable no purulent drainage noted. Ischemic changes noted to right second toe. Pain upon palpation to right hallux and 3rd toe.                           Mild ischemic changes noted to left hallux. CFT <3s. No purulent drainage noted.     Left foot.                           Laboratory:  CBC:     Recent Labs  Lab  05/10/18  0500   WBC 6.88   RBC 2.90*   HGB 8.5*   HCT 28.9*   *   *   MCH 29.3   MCHC 29.4*     CMP:     Recent Labs  Lab 05/10/18  0500   *   CALCIUM 8.6*   ALBUMIN 2.1*   PROT 5.9*      K 3.7   CO2 25      BUN 22*   CREATININE 2.1*   ALKPHOS 151*   ALT 10   AST 20   BILITOT 0.4       Diagnostic Results:  Xray: Left: No fracture dislocation bone destruction seen.  There is mild DJD.  There are vascular changes suggesting diabetes.  There are spurs on the calcaneus.    Right: There is hardware consistent with talocalcaneal arthrodesis.  There are vascular calcification suggesting diabetes.  No fracture dislocation bone destruction seen.    Clinical Findings:  Dry stable gangrene noted to right hallux and 3rd toe. Ischemic changes noted to right 2nd and left hallux.

## 2018-05-10 NOTE — H&P
"Inpatient Radiology Pre-procedure Note    History of Present Illness:  Lv Crocker is a 42 y.o. male who presents for ultrasound guided paracentesis.     Admission H&P reviewed.  Past Medical History:   Diagnosis Date    Anxiety     Arthritis     Ascites     Thought to be 2/2 heart tpx; liver bx 3/31/17 w/o significant fibrosis    Cardiomyopathy     Depression     Controlled "for the most part" on Lexipro    Encounter for blood transfusion     during transplant    GERD (gastroesophageal reflux disease)     Hashimoto's disease     History of CHF (congestive heart failure)     Previously EF 20% (prior to heart transplant); last EF 60%, PAP 41 as of 12/12/17; throught to be 2/2 genetics & DM1    History of coronary artery disease     H/o Coronary artery disease; resolved since heart transplant 2014    History of myocardial infarction     h/o MI x3 prior to heart transplant    HLD (hyperlipidemia) 6/11/2015    Hx of psychiatric care     Hypoglycemia unawareness in type 1 diabetes mellitus     Hypothyroid     Initially hyperthyroid 2/2 Hoshimoto's thyroiditis; now on levothyroxine 150 mcg qd    Pleural effusion due to another disorder     Thought to be 2/2 heart tpx; s/p PleuRx catheter placement and removal after 1-2 months    Psychiatric problem     Pulmonary hypertension assoc with unclear multi-factorial mechanisms 12/12/2017    PAP 41 (EF 60%) on ECHO 12/12/17    Syncope 6/30/2015    Type I diabetes mellitus, well controlled     Well controlled; Dx'd @5y/o; on N insulin 20U BID & Humalog 10U w/meals +SSI; Glu 89 11/9/17; A1c 7.2% 4/5/17    Unspecified essential hypertension 05/29/2014    Well controlled; Last /57 on 11/9/17     Past Surgical History:   Procedure Laterality Date    CARDIAC CATHETERIZATION      CARDIAC SURGERY      CHOLECYSTECTOMY      COLONOSCOPY N/A 3/13/2018    Procedure: COLONOSCOPY;  Surgeon: Tim Spencer MD;  Location: [facility] ([address] [place] [address]);  Service: " Endoscopy;  Laterality: N/A;    CORONARY ANGIOPLASTY      FOOT SPLIT TRANSFER OF THE POSTERIOR TIBIALIS TENDON PROCEDURE      HEART TRANSPLANT  2014    KNEE SURGERY      PleuRx catheter placement  01/11/2018    Plan for removal after 1-2 months by Dr. James in cardiothoracic surgery    s/p oht  November 2014    stent placemnet         Review of Systems:   As documented in primary team H&P    Home Meds:   Prior to Admission medications    Medication Sig Start Date End Date Taking? Authorizing Provider   aspirin (ECOTRIN) 81 MG EC tablet Take 1 tablet (81 mg total) by mouth once daily. 2/14/18  Yes Gage Goodman MD   escitalopram oxalate (LEXAPRO) 20 MG tablet Take 1 tablet (20 mg total) by mouth once daily. 3/7/18  Yes Kanika Ferreira MD   gabapentin (NEURONTIN) 300 MG capsule Take 3 capsules (900 mg total) by mouth 3 (three) times daily. 2/14/18  Yes Gage Goodman MD   insulin aspart (NOVOLOG) 100 unit/mL InPn pen Inject 4 Units into the skin 3 (three) times daily. 2/15/18 2/15/19 Yes Gage Goodman MD   insulin detemir (LEVEMIR FLEXTOUCH) 100 unit/mL (3 mL) SubQ InPn pen Inject 15 Units into the skin every evening.  Patient taking differently: Inject 15 Units into the skin 2 (two) times daily.  2/14/18 2/14/19 Yes Gage Goodman MD   lancets Misc 1 Device by Misc.(Non-Drug; Combo Route) route 4 (four) times daily with meals and nightly. 12/24/14  Yes JOSE DANIEL Haskins   levothyroxine (SYNTHROID) 150 MCG tablet Take 1 tablet (150 mcg total) by mouth every morning. 5/31/17  Yes Monica Fernandez DNP, NP   magnesium oxide (MAG-OX) 400 mg tablet Take 1 tablet (400 mg total) by mouth once daily. 12/20/16  Yes Chandni Fernandez MD   mycophenolate (MYFORTIC) 360 MG TbEC Take 2 tablets (720 mg total) by mouth 2 (two) times daily. 3/8/18 3/8/19 Yes Kanika Ferreira MD   nortriptyline (PAMELOR) 25 MG capsule Take 1 capsule (25 mg total) by mouth every evening. 4/27/17  Yes Kanika Ferreira MD   ondansetron (ZOFRAN-ODT) 4  "MG TbDL Take 2 tablets (8 mg total) by mouth every 8 (eight) hours as needed (nausea). 6/21/17  Yes Philippe Mohr MD   pantoprazole (PROTONIX) 40 MG tablet TAKE ONE TABLET BY MOUTH EVERY DAY 8/15/16  Yes Tim Mao NP   pravastatin (PRAVACHOL) 40 MG tablet Take 1 tablet (40 mg total) by mouth every evening.  Patient taking differently: Take 40 mg by mouth once daily.  4/11/17  Yes Alexandr Hernández MD   predniSONE (DELTASONE) 2.5 MG tablet Take 1 tablet (2.5 mg total) by mouth once daily. S/P Heart Transplant 11/18/2014 ICD-10 Z94.1 1/10/18  Yes Alexandr Hernández MD   tacrolimus (PROGRAF) 1 MG Cap Taked 3mg orally in the morning and 3mg orally in the evening. S/p heart transplant  11/18/2014  ICD-10 Z94.1 2/14/18  Yes Gage Goodman MD   tamsulosin (FLOMAX) 0.4 mg Cp24 TAKE 2 CAPSULES(0.8 MG) BY MOUTH EVERY EVENING 2/7/18  Yes Alexandr Hernández MD   torsemide (DEMADEX) 20 MG Tab Take 2 tablets (40 mg total) by mouth once daily. 1/3/18 1/3/19 Yes Alexandr Hernández MD   pen needle, diabetic 32 gauge x 5/32" Ndle Uses 5 pen needles a day 4/5/17   Monica Fernandez, RA, NP     Scheduled Meds:    sodium chloride 0.9%   Intravenous Once    acetaminophen  650 mg Oral TID    cetirizine  5 mg Oral Daily    docusate sodium  100 mg Oral BID    epoetin jose miguel (PROCRIT) injection  100 Units/kg (Dosing Weight) Intravenous Every Mon, Wed, Fri    escitalopram oxalate  20 mg Oral Daily    famotidine  20 mg Oral QHS    guaiFENesin  1,200 mg Oral BID    heparin (porcine)  5,000 Units Subcutaneous Q12H    insulin aspart U-100  5 Units Subcutaneous TIDWM    insulin detemir U-100  15 Units Subcutaneous Daily    lactulose  200 g Rectal TID    levothyroxine  137 mcg Oral Before breakfast    midodrine  10 mg Oral TID    mycophenolate  250 mg Oral BID    polyethylene glycol  17 g Oral BID    predniSONE  5 mg Oral Daily    QUEtiapine  50 mg Oral QHS    tacrolimus  5 mg Oral BID     Continuous Infusions:   PRN " Meds:sodium chloride, sodium chloride 0.9%, sodium chloride 0.9%, albuterol-ipratropium 2.5mg-0.5mg/3mL, ALPRAZolam, benzonatate, calcium carbonate, dextrose 50%, dextrose 50%, glucagon (human recombinant), glucose, glucose, heparin (porcine), insulin aspart U-100, midodrine, ondansetron, oxyCODONE  Anticoagulants/Antiplatelets: Heparin    Allergies:   Review of patient's allergies indicates:   Allergen Reactions    No known drug allergies      Sedation Hx: have not been any systemic reactions    Labs:  No results for input(s): INR in the last 168 hours.    Invalid input(s):  PT,  PTT    Recent Labs  Lab 05/10/18  0500   WBC 6.88   HGB 8.5*   HCT 28.9*   *   *      Recent Labs  Lab 05/10/18  0500   *      K 3.7      CO2 25   BUN 22*   CREATININE 2.1*   CALCIUM 8.6*   MG 1.6   ALT 10   AST 20   ALBUMIN 2.1*   BILITOT 0.4         Vitals:  Temp: 98.4 °F (36.9 °C) (05/10/18 1222)  Pulse: (!) 115 (05/10/18 1222)  Resp: 18 (05/10/18 1222)  BP: 112/81 (05/10/18 1222)  SpO2: 95 % (05/10/18 1222)     Physical Exam:  ASA: III  Mallampati: II    General: no acute distress  Mental Status: alert and oriented to person, place and time  HEENT: normocephalic, atraumatic  Chest: unlabored breathing  Abdomen: nondistended  Extremity: moves all extremities    Plan: Paracentesis   Sedation Plan: Local sedation     Delvis Christianson MD  PGY-III  Dept of Radiology   Pager: [phone]

## 2018-05-10 NOTE — PLAN OF CARE
Discharge Planning:    Patient had trache successfully removed on 5/9.  PEG tube was removed on 5/10  Paracentesis scheduled for today. No serial paracentesis needed at this time  HD days currently are MWF, next session due on 5/11  Called Endocrine resident to request patient be transitioned off the Insulin gtt (no longer receiving tube feedings). Patient is reportedly eating 80% of meals.     Discussed the above with [facility] Liaisons Corazon Alegria and Allen who will discuss the case with Dr. Martel.     [facility] would like for the patient to transfer tomorrow 5/11.    [facility] Friday vs Monday--waiting to hear back from [facility] regarding Fri vs Mon acceptance.    Thanks  Shirin   [phone]

## 2018-05-10 NOTE — PT/OT/SLP PROGRESS
Occupational Therapy      Patient Name:  Lv Crocker   MRN:  [id number]    Patient not seen today secondary to Other (Comment) (Pt attempted to be seen in AM but pt recieving enema. 2nd attempt pt off the floor for paracentesis. Unable to return for 3rd attempt.  ). Will follow-up next scheduled OT session.    Leonor Wagner, OT  5/10/2018

## 2018-05-10 NOTE — SUBJECTIVE & OBJECTIVE
Interval History: No acute problems overnight. Trach successfully decanulated yesterday. Planning for paracentesis and peg tube removal today.     Continuous Infusions:   insulin (HUMAN R) infusion (adults) 0.6 Units/hr (05/10/18 0838)     Scheduled Meds:   sodium chloride 0.9%   Intravenous Once    acetaminophen  650 mg Oral TID    cetirizine  5 mg Oral Daily    docusate sodium  100 mg Oral BID    epoetin jose miguel (PROCRIT) injection  100 Units/kg (Dosing Weight) Intravenous Every Mon, Wed, Fri    escitalopram oxalate  20 mg Oral Daily    famotidine  20 mg Oral QHS    guaiFENesin  1,200 mg Oral BID    heparin (porcine)  5,000 Units Subcutaneous Q12H    insulin aspart U-100  4 Units Subcutaneous TIDWM    levothyroxine  137 mcg Oral Before breakfast    midodrine  10 mg Oral TID    mycophenolate  250 mg Oral BID    polyethylene glycol  17 g Oral BID    predniSONE  5 mg Oral Daily    QUEtiapine  50 mg Oral QHS    tacrolimus  5 mg Oral BID     PRN Meds:sodium chloride, sodium chloride 0.9%, sodium chloride 0.9%, albuterol-ipratropium 2.5mg-0.5mg/3mL, ALPRAZolam, benzonatate, calcium carbonate, dextrose 50%, dextrose 50%, glucagon (human recombinant), glucose, glucose, heparin (porcine), insulin aspart U-100, midodrine, ondansetron, oxyCODONE    Review of patient's allergies indicates:   Allergen Reactions    No known drug allergies      Objective:     Vital Signs (Most Recent):  Temp: 97.9 °F (36.6 °C) (05/10/18 0743)  Pulse: (!) 124 (05/10/18 0743)  Resp: 18 (05/10/18 0743)  BP: 104/61 (05/10/18 0743)  SpO2: (!) 92 % (05/10/18 0743) Vital Signs (24h Range):  Temp:  [97.9 °F (36.6 °C)-98.8 °F (37.1 °C)] 97.9 °F (36.6 °C)  Pulse:  [108-124] 124  Resp:  [16-18] 18  SpO2:  [92 %-97 %] 92 %  BP: ()/(57-78) 104/61     Patient Vitals for the past 72 hrs (Last 3 readings):   Weight   05/10/18 0400 79 kg (174 lb 2.6 oz)   05/09/18 0400 80.3 kg (177 lb 0.5 oz)     Body mass index is 27.28  kg/m².    Intake/Output Summary (Last 24 hours) at 05/10/18 0851  Last data filed at 05/10/18 0600   Gross per 24 hour   Intake          1874.74 ml   Output             3150 ml   Net         -1275.26 ml     Physical Exam   Constitutional: He is oriented to person, place, and time. He appears well-developed and well-nourished.   Neck: Normal range of motion. Neck supple. No JVD present.   Cardiovascular: Normal rate and regular rhythm.  Exam reveals no gallop and no friction rub.    No murmur heard.  Pulmonary/Chest: Effort normal and breath sounds normal. He has no wheezes. He has no rales.   Trach in place   Abdominal: Soft. Bowel sounds are normal. He exhibits distension. There is no tenderness.   Musculoskeletal: He exhibits no edema.   Neurological: He is alert and oriented to person, place, and time.   Skin: Skin is warm and dry.   RLE- 1st and 3rd gangrenous toes     Significant Labs:  CBC:    Recent Labs  Lab 05/08/18  0525 05/09/18  0500 05/10/18  0500   WBC 5.82 6.19 6.88   RBC 2.97* 2.98* 2.90*   HGB 8.8* 8.8* 8.5*   HCT 28.6* 28.5* 28.9*   * 372* 365*   MCV 96 96 100*   MCH 29.6 29.5 29.3   MCHC 30.8* 30.9* 29.4*     BNP:  No results for input(s): BNP in the last 168 hours.    Invalid input(s): BNPTRIAGELBLO  CMP:    Recent Labs  Lab 05/08/18  0525 05/09/18  0500 05/10/18  0500   * 237* 149*   CALCIUM 8.9 8.2* 8.6*   ALBUMIN 2.3* 2.0* 2.1*   PROT 6.2 5.7* 5.9*   * 134* 137   K 3.6 3.7 3.7   CO2 25 23 25   CL 97 100 102   BUN 30* 37* 22*   CREATININE 2.4* 2.9* 2.1*   ALKPHOS 167* 153* 151*   ALT 10 8* 10   AST 11 11 20   BILITOT 0.4 0.3 0.4      Microbiology:  Microbiology Results (last 7 days)     Procedure Component Value Units Date/Time    Blood culture [[id number]] Collected:  05/05/18 0644    Order Status:  Completed Specimen:  Blood from Midline, Basilic, Right Updated:  05/09/18 1012     Blood Culture, Routine No Growth to date     Blood Culture, Routine No Growth to date      Blood Culture, Routine No Growth to date     Blood Culture, Routine No Growth to date     Blood Culture, Routine No Growth to date    Blood culture [[id number]] Collected:  05/05/18 0812    Order Status:  Completed Specimen:  Blood Updated:  05/09/18 1012     Blood Culture, Routine No Growth to date     Blood Culture, Routine No Growth to date     Blood Culture, Routine No Growth to date     Blood Culture, Routine No Growth to date     Blood Culture, Routine No Growth to date    Narrative:       Collection has been rescheduled by CDP at 5/5/2018 07:40 Reason: Due   now  Collection has been rescheduled by CDP at 5/5/2018 07:40 Reason: Due   now    Respiratory Viral Panel by PCR [facility]; Nasal Swab [[id number]] Collected:  05/05/18 0644    Order Status:  Completed Specimen:  Respiratory Updated:  05/08/18 1138     Respiratory Virus Panel, source Nasal Swab     RVP - Adenovirus Not Detected     Comment: Detects Serotypes B and E. Detection of Serotype C may   be limited. If Adenovirus infection is suspected and a   Not Detected result is returned the sample should be   re-tested for Adenovirus using an independent method  (e.g. [employer] Adenovirus Quantitative Real-Time  PCR test.          Enterovirus Not Detected     Comment: Cross-reactivity has been observed between certain Rhinovirus  strains and the Enterovirus assay.          Human Bocavirus Not Detected     Human Coronavirus Not Detected     Comment: The Human Coronavirus assay detects Human coronavirus types  229E, OC43,NL63 and HKU1.          RVP - Human Metapneumovirus (hMPV) Not Detected     RVP - Influenza A Not Detected     Influenza A - A8L0-31 Not Detected     RVP - Influenza B Not Detected     Parainfluenza Not Detected     Respiratory Syncytial VirusVirus (RSV) A Not Detected     Comment: The Respiratory Syncytial Viral assay detects types A and B,  however it does not distinguish between the two.          RVP - Rhinovirus Not Detected      "Comment: Cross-Reactivity has been observed between certain   Rhinovirus strains and the Enterovirus assay.  Target Enriched Mulitplex Polymerase Chain Reaction (TEM-PCR)  allows for the detection of multiple pathogens out of a single  reaction.  This test was developed and its performance   characteristics determined by [employer].  It has not   been cleared or approved by the U.S.Food and Drug Administration.  Results should be used in conjunction with clinical findings,   and should not form the sole basis for a diagnosis or treatment  decision.  TEM-PCR is a licensed technology of [employer].         Narrative:       Receiving Lab:->[facility]    Culture, Respiratory with Gram Stain [[id number]]     Order Status:  Canceled Specimen:  Respiratory     Blood culture [[id number]]     Order Status:  Canceled Specimen:  Blood     Culture, Respiratory with Gram Stain [[id number]]     Order Status:  Canceled Specimen:  Respiratory from Tracheal Aspirate         I have reviewed all pertinent labs within the past 24 hours.    Estimated Creatinine Clearance: 42 mL/min (A) (based on SCr of 2.1 mg/dL (H)).    Diagnostic Results:  ABD US (5/8/18): "Moderate volume ascites. Bilateral pleural effusions."  "

## 2018-05-10 NOTE — PROGRESS NOTES
[facility]  Heart Transplant  Progress Note    Patient Name: Lv Crocker  MRN: [id number]  Admission Date: 3/11/2018  Hospital Length of Stay: 59 days  Attending Physician: Jose A Lloyd MD  Primary Care Provider: Philippe Mohr MD  Principal Problem:Acute respiratory failure with hypoxia    Subjective:     Interval History: No acute problems overnight. Trach successfully decanulated yesterday. Planning for paracentesis and peg tube removal today.     Continuous Infusions:   insulin (HUMAN R) infusion (adults) 0.6 Units/hr (05/10/18 0838)     Scheduled Meds:   sodium chloride 0.9%   Intravenous Once    acetaminophen  650 mg Oral TID    cetirizine  5 mg Oral Daily    docusate sodium  100 mg Oral BID    epoetin jose miguel (PROCRIT) injection  100 Units/kg (Dosing Weight) Intravenous Every Mon, Wed, Fri    escitalopram oxalate  20 mg Oral Daily    famotidine  20 mg Oral QHS    guaiFENesin  1,200 mg Oral BID    heparin (porcine)  5,000 Units Subcutaneous Q12H    insulin aspart U-100  4 Units Subcutaneous TIDWM    levothyroxine  137 mcg Oral Before breakfast    midodrine  10 mg Oral TID    mycophenolate  250 mg Oral BID    polyethylene glycol  17 g Oral BID    predniSONE  5 mg Oral Daily    QUEtiapine  50 mg Oral QHS    tacrolimus  5 mg Oral BID     PRN Meds:sodium chloride, sodium chloride 0.9%, sodium chloride 0.9%, albuterol-ipratropium 2.5mg-0.5mg/3mL, ALPRAZolam, benzonatate, calcium carbonate, dextrose 50%, dextrose 50%, glucagon (human recombinant), glucose, glucose, heparin (porcine), insulin aspart U-100, midodrine, ondansetron, oxyCODONE    Review of patient's allergies indicates:   Allergen Reactions    No known drug allergies      Objective:     Vital Signs (Most Recent):  Temp: 97.9 °F (36.6 °C) (05/10/18 0743)  Pulse: (!) 124 (05/10/18 0743)  Resp: 18 (05/10/18 0743)  BP: 104/61 (05/10/18 0743)  SpO2: (!) 92 % (05/10/18 0743) Vital Signs (24h Range):  Temp:   [97.9 °F (36.6 °C)-98.8 °F (37.1 °C)] 97.9 °F (36.6 °C)  Pulse:  [108-124] 124  Resp:  [16-18] 18  SpO2:  [92 %-97 %] 92 %  BP: ()/(57-78) 104/61     Patient Vitals for the past 72 hrs (Last 3 readings):   Weight   05/10/18 0400 79 kg (174 lb 2.6 oz)   05/09/18 0400 80.3 kg (177 lb 0.5 oz)     Body mass index is 27.28 kg/m².    Intake/Output Summary (Last 24 hours) at 05/10/18 0851  Last data filed at 05/10/18 0600   Gross per 24 hour   Intake          1874.74 ml   Output             3150 ml   Net         -1275.26 ml     Physical Exam   Constitutional: He is oriented to person, place, and time. He appears well-developed and well-nourished.   Neck: Normal range of motion. Neck supple. No JVD present.   Cardiovascular: Normal rate and regular rhythm.  Exam reveals no gallop and no friction rub.    No murmur heard.  Pulmonary/Chest: Effort normal and breath sounds normal. He has no wheezes. He has no rales.   Trach in place   Abdominal: Soft. Bowel sounds are normal. He exhibits distension. There is no tenderness.   Musculoskeletal: He exhibits no edema.   Neurological: He is alert and oriented to person, place, and time.   Skin: Skin is warm and dry.   RLE- 1st and 3rd gangrenous toes     Significant Labs:  CBC:    Recent Labs  Lab 05/08/18  0525 05/09/18  0500 05/10/18  0500   WBC 5.82 6.19 6.88   RBC 2.97* 2.98* 2.90*   HGB 8.8* 8.8* 8.5*   HCT 28.6* 28.5* 28.9*   * 372* 365*   MCV 96 96 100*   MCH 29.6 29.5 29.3   MCHC 30.8* 30.9* 29.4*     BNP:  No results for input(s): BNP in the last 168 hours.    Invalid input(s): BNPTRIAGELBLO  CMP:    Recent Labs  Lab 05/08/18  0525 05/09/18  0500 05/10/18  0500   * 237* 149*   CALCIUM 8.9 8.2* 8.6*   ALBUMIN 2.3* 2.0* 2.1*   PROT 6.2 5.7* 5.9*   * 134* 137   K 3.6 3.7 3.7   CO2 25 23 25   CL 97 100 102   BUN 30* 37* 22*   CREATININE 2.4* 2.9* 2.1*   ALKPHOS 167* 153* 151*   ALT 10 8* 10   AST 11 11 20   BILITOT 0.4 0.3 0.4       Microbiology:  Microbiology Results (last 7 days)     Procedure Component Value Units Date/Time    Blood culture [[id number]] Collected:  05/05/18 0644    Order Status:  Completed Specimen:  Blood from Midline, Basilic, Right Updated:  05/09/18 1012     Blood Culture, Routine No Growth to date     Blood Culture, Routine No Growth to date     Blood Culture, Routine No Growth to date     Blood Culture, Routine No Growth to date     Blood Culture, Routine No Growth to date    Blood culture [[id number]] Collected:  05/05/18 0812    Order Status:  Completed Specimen:  Blood Updated:  05/09/18 1012     Blood Culture, Routine No Growth to date     Blood Culture, Routine No Growth to date     Blood Culture, Routine No Growth to date     Blood Culture, Routine No Growth to date     Blood Culture, Routine No Growth to date    Narrative:       Collection has been rescheduled by CDP at 5/5/2018 07:40 Reason: Due   now  Collection has been rescheduled by CDP at 5/5/2018 07:40 Reason: Due   now    Respiratory Viral Panel by PCR [facility]; Nasal Swab [[id number]] Collected:  05/05/18 0644    Order Status:  Completed Specimen:  Respiratory Updated:  05/08/18 1138     Respiratory Virus Panel, source Nasal Swab     RVP - Adenovirus Not Detected     Comment: Detects Serotypes B and E. Detection of Serotype C may   be limited. If Adenovirus infection is suspected and a   Not Detected result is returned the sample should be   re-tested for Adenovirus using an independent method  (e.g. [employer] Adenovirus Quantitative Real-Time  PCR test.          Enterovirus Not Detected     Comment: Cross-reactivity has been observed between certain Rhinovirus  strains and the Enterovirus assay.          Human Bocavirus Not Detected     Human Coronavirus Not Detected     Comment: The Human Coronavirus assay detects Human coronavirus types  229E, OC43,NL63 and HKU1.          RVP - Human Metapneumovirus (hMPV) Not Detected     RVP - Influenza A Not  "Detected     Influenza A - E0W1-22 Not Detected     RVP - Influenza B Not Detected     Parainfluenza Not Detected     Respiratory Syncytial VirusVirus (RSV) A Not Detected     Comment: The Respiratory Syncytial Viral assay detects types A and B,  however it does not distinguish between the two.          RVP - Rhinovirus Not Detected     Comment: Cross-Reactivity has been observed between certain   Rhinovirus strains and the Enterovirus assay.  Target Enriched Mulitplex Polymerase Chain Reaction (TEM-PCR)  allows for the detection of multiple pathogens out of a single  reaction.  This test was developed and its performance   characteristics determined by [employer].  It has not   been cleared or approved by the U.S.Food and Drug Administration.  Results should be used in conjunction with clinical findings,   and should not form the sole basis for a diagnosis or treatment  decision.  TEM-PCR is a licensed technology of [employer]         Narrative:       Receiving Lab:->[facility]    Culture, Respiratory with Gram Stain [[id number]]     Order Status:  Canceled Specimen:  Respiratory     Blood culture [[id number]]     Order Status:  Canceled Specimen:  Blood     Culture, Respiratory with Gram Stain [[id number]]     Order Status:  Canceled Specimen:  Respiratory from Tracheal Aspirate         I have reviewed all pertinent labs within the past 24 hours.    Estimated Creatinine Clearance: 42 mL/min (A) (based on SCr of 2.1 mg/dL (H)).    Diagnostic Results:  ABD US (5/8/18): "Moderate volume ascites. 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 Most recently has had ongoing issues with his lungs, had gotten 2 thoracenteses, after which he underwent VATS 01/19/18 followed by " pleurex catheter placement and effusion drainage 01/11/18, subsequently had it removed 02/07/18 after drainage had decreased despite multiple attempts to drain it. Has been having significant coughing fits that result in him being near-syncopal at times, although difficult to say if he has passed out or not- patient most recently was admitted to the hospital for increased AGUILAR, coughing and pre-syncope 02/11-02/14/18 [place] [facility]   Patient was started on antibiotics for suspected bacterial infection, with some diarrhea, bronchitis, and possible pneumonia. Ct chest showed some pleural fluid collection and after talking with Dr. James, we arranged for him to receive a diagnostic tap with IR, from which the fluid collection demonstrated no growth or significant findings at all really. Cell counts do not appear to have been sent but will recheck. Patient states since his hospital stay, yesterday had a significant coughing fit again, after which he nearly passed out, is being seen in clinic today for this. Denies any cardiopulmonary complaints, no leg swelling, has some abdominal swelling, which is moderate for him. Denies significant shortness of breath with mild exertion, had 6MWT yesterday to see if he qualified for home O2, and his O2 sats actually improved after recovery from where he started initially. He and his wife admit he is in bed most days, not very active.     Mr. Crocker presented to the ED 3/11/18 with approximately 3 weeks of worsening diarrhea and 2-3 days of sinus pressure, drainage, and worsened cough.  He notes that he had a coughing fit last night and passed out, coughed throughout most of the night.  This also happened on 2/25/18.  He last saw Dr Ferreira in clinic on 2/20/18 where he was taken off myfortic and switched to cellcept (he hadn't been on cellcept because of leukopenia when they tried post-transplant).  Though his diarrhea had improved after his last discharge, he states it has increased  now to 15x/day, clear/yellow/green, no fevers, no exacerbating foods per say.  He was taken back off the cellcept and placed back on myfortic this past week and has not noticed immediate change in diarrhea. He also reports his baseline coughing fits havent improved and are minimally responsive to tessalon pearls.  Came on last night, he lost consciousness during a fit once which is relatively normal for him, and had two more during HPI.  He notes no sick contacts but does state he's had some URI symptoms as above.  His baseline vitals are usually -120 and BP 90s/60s-110s/70s.  He reports a history of intermittent diarrhea - did have Cdiff many years ago but this smells different.  No abdominal pain, no nausea, no vomiting.  No blood in diarrhea.  Appears last c-scope in 2015 with no evidence of infection or inflammatory processes.  He does report IBS which is different that this.       * Acute respiratory failure with hypoxia    - Resolved. Now with trach decannulation  - Stoma care / education provided by pulmonology   - S/P Bronch and intubation 3/14 now post tracheostomy due to inability to extubate  - Etiology = RSV   - Appreciate PT/OT/Wound care.        Heart transplanted    - TTE 4/24/18 showed normal graft function but has known history of restrictive CM post transplant.  - Continue Prednisone 5mg daily, Cellcept 250mg BID and Tacrolimus 5/5 with plan to adjust as needed for goal 6-10.        Abdominal distention    - Paracentesis today.   - Surgery to remove peg tube today given risk of infection with ascites  - Albumin PRN (if >5L removed)         Type 1 diabetes mellitus with stage 3 chronic kidney disease    - Appreciate Endocrine's recs  - Insulin gtt per endocrine recs. Adjust as needed  - Per endocrine please call when he is off tube feeds to adjust ggt and transition to rehab/outpatient regimen        Hypothyroidism due to Hashimoto's thyroiditis    - Appreciate Endocrine's recs  - Switched to  PO Levothyroxine 137mcg daily        Alteration in skin integrity    - Wound care following. Local care only at this point.         ESRD (end stage renal disease)    - Appreciate Nephrology recs.   - Continue midodrine  - HD per nephrology [facility]   - Needs to have fistula at some point to reduce risk of infection with Permacath         Restrictive cardiomyopathy    - No changes (see above)         Anemia    - Nephrology is managing ProCrit   - Hgb stable         Debility    - PT/OT/SLP daily. Recommending rehab.  - Nutrition following.   - Passed MBSS with speech.  - Regular diet with thin liquids,   - Peg tube to be removed today. Will stop tube feeds         Anxiety    - Increased Lexapro to 20mg daily  - Continue seroquel 50 QHS   - Continue .5mg xanax PRN Q8H   - Appreciate psychiatry input.             Mamadou rhodes, DO  Heart Transplant  [facility]

## 2018-05-11 NOTE — PROGRESS NOTES
Discharge planning    Met with pt's wife until pt returned from dialysis. Explained to wife that if pt medically able to discharge he could go today to [facility] on [address]. Explained that if pt is medically able to discharge and has an accepting facility, pt's insurance may not agree to pay hospital bill and pt might get bill from hospital. Also explained that Medicare will not cover ambulance transportation to [facility] and pt unable to sit up in wheelchair van for lengthy trip. Wife intended for SW to discuss this with pt when he returned, but he came back on O2 and vomiting. Unable to assess pt for coping due to condition. Wife expressing frustration about discharge situation and denies further needs, questions, concerns at this time and none indicated. Providing ongoing psychosocial and counseling support, education, resources, assistance and discharge planning as indicated. Following and available.    [facility]  [phone], fax [phone],

## 2018-05-11 NOTE — NURSING
Rounds Report: Attended interdisciplinary rounds this afternoon with the transplant team including SW, physicians, fellows,  mid-level providers, and transplant coordinators.  Discussed plan of care which was to find a place willing to take patient for rehab now that his trach and peg have been removed. He was going to go to [facility] since they had the facilities for dialysis. However, wife wanted to have him near [place]. In the meantime, his condition took a turn for the worse with him short of breath and bouts of vomiting. There is question of aspiration.

## 2018-05-11 NOTE — ASSESSMENT & PLAN NOTE
- Resolved. Now with trach decannulation  - Etiology = RSV   - Stoma care / education provided by pulmonology   - S/P Bronch and intubation 3/14 now post tracheostomy due to inability to extubate  - Appreciate PT/OT/Wound care.

## 2018-05-11 NOTE — ASSESSMENT & PLAN NOTE
- PT/OT/SLP daily. Recommending rehab.  - Nutrition following. Regular diet with thin liquids  - Passed MBSS with speech.

## 2018-05-11 NOTE — PROGRESS NOTES
Chart was reviewed again to review his CXR. It shows worsened air space opacity on left side, likely aspiration pneumonia since he had recent cough and emesis. Will discuss with primary team.    During review of recent notes, noted SW note about outpatient nephrologist wanting to contact us, I did not receive any page. I called the reported phone number included in SW note for Dr. Zhao but he did not answer. But nephrology NP could get in contact with Dr. Zhao to discuss the case.

## 2018-05-11 NOTE — ASSESSMENT & PLAN NOTE
baseline sCr 2.6-3.0 consistent with CKD stage IV  - anuric CACHORRO likely ischemic ATN from prolonged pre-renal state (diarrhea) in setting of prograf renal vasoconstriction; cannot r/o septic ATN or Prograf toxicity  - SLED started 3/14. TDC placed 4/4/18.   - remains anuric  - Continue epo  -Trach and PEG removed 5/10. Given cough and vomiting with oxygen desat last night , obtain CXR-ordered  -5/11 Remains anuric. Patient seen in [facility]. Tolerating 2 L UF.  Dialysate adjusted. Albumin and midodrine PRN. Continue MWF HD while inpatient.      Ascites  -Reports PTA paracentesis every 1-2 months with 2-3 L removal each time.   -IR Paracentesis not done (report pending)- Defer to primary.     Anemia of CKD  -Iron 70, T sat 35, TIBC 225 and ferritan 1287  -Continue epo. Increased epo 5/4     BMD  Lab Results   Component Value Date    PTH 23.0 04/19/2018    CALCIUM 9.8 05/11/2018    CAION 0.98 (L) 03/17/2018    PHOS 3.5 05/11/2018   Renal diet.

## 2018-05-11 NOTE — PROGRESS NOTES
Patient arrived on unit via bed. Unable to obtain pt weight. Dialysis tx initiated via left CVC. Ports aspirated and flushed without difficulty. Lines connected and secured. Orders and machine settings verified. Tx started.

## 2018-05-11 NOTE — PROGRESS NOTES
Dialysis tx completed. 3.5 hours. 1 liter removed. Left CVC flushed with NS, locked with heparin, capped and secured. Tolerated tx well. Report given to Magalis CHRISTINA.

## 2018-05-11 NOTE — PROGRESS NOTES
[facility]  Nephrology  Progress Note    Patient Name: Lv Crocker  MRN: [id number]  Admission Date: 3/11/2018  Hospital Length of Stay: 60 days  Attending Provider: Jose A Lloyd MD   Primary Care Physician: Philippe Mohr MD  Principal Problem:Acute respiratory failure with hypoxia    Subjective:     HPI: Mr. Crocker is a 41 yo WM with T1DM, Hashimoto thyroiditis, h/o OHTx 11/2014, and CKD stage 4 who was admitted on 3/11/18 for persistent diarrhea. He reported a 3 week history of diarrhea up to 15x/day. Associated symptoms including URI symptoms, coughing fits, and coughing syncope. Prograf level was 14.3. His post-heart transplant course has been complicated by AMR 4/2015, CMV, post-transplant restrictive cardiomyopathy, recurrent pleural effusions/ascites requiring monthly thoracenteses/paracenteses, VATS 1/11/18 with Pleur-X catheter (now removed), and CKD stage 4 with baseline sCr 2.6-3.0. Baseline -120s and baseline BP 90s-110s/60-70s. Upon admission he was started on IVF and diarrhea workup was initiated. On 3/12 he was noted to have decreased UOP despite fluids; NS was increased to 100cc/hr. He was scheduled for a colonoscopy on 3/13 however procedure was cancelled due to hypoxia and fever. He was transferred to the ICU for closer monitoring. He continued to have oliguria overnight. Bladder scan revealed 200cc of urine but patient refused osullivan catheter placement. Wife reports that patient always has a hard time urinating again after osullivan catheters are placed/removed so he refuses them. He remained hypoxic despite FiO2 70% and ABG revealed combined respiratory and metabolic acidosis with pH 7.17. He was started on BiPAP as well as a bicarb infusion at 50cc/hr. ABG with mild improvement. AM labs revealed K 6.2 and he was shifted and given kayexalate.Trialysis catheter was placed this morning and he subsequently developed hypotension and was started on levophed. He was  intubated later this morning. Nephrology consulted for CACHORRO. All history obtained by primary team and chart review as patient was intubated/sedated on exam. Consent for dialysis obtained by patient's wife and placed in chart. Of note, both of patient's parents were on dialysis.     Interval History: Seen in [facility]. Tolerated 2 L net UF. Coughing spell last night leading to oxygen desat 75%  then improved 3 L NC. Vomitted Currently on RA.   Trach and PEG removed. Per patient,  IR paracentesis not done? (report pending).     Review of patient's allergies indicates:   Allergen Reactions    No known drug allergies      Current Facility-Administered Medications   Medication Frequency    0.9%  NaCl infusion (for blood administration) Q24H PRN    0.9%  NaCl infusion PRN    0.9%  NaCl infusion PRN    0.9%  NaCl infusion Once    0.9%  NaCl infusion PRN    0.9%  NaCl infusion Once    acetaminophen tablet 650 mg TID    albuterol-ipratropium 2.5mg-0.5mg/3mL nebulizer solution 3 mL Q4H PRN    ALPRAZolam tablet 0.5 mg Q6H PRN    benzonatate capsule 100 mg TID PRN    calcium carbonate 200 mg calcium (500 mg) chewable tablet 500 mg BID PRN    cetirizine tablet 5 mg Daily    dextrose 50% injection 12.5 g PRN    dextrose 50% injection 25 g PRN    docusate sodium capsule 100 mg BID    epoetin jose miguel injection 8,100 Units Every Mon, Wed, Fri    escitalopram oxalate tablet 20 mg Daily    famotidine tablet 20 mg QHS    glucagon (human recombinant) injection 1 mg PRN    glucose chewable tablet 16 g PRN    glucose chewable tablet 24 g PRN    guaiFENesin 12 hr tablet 1,200 mg BID    heparin (porcine) injection 1,000 Units PRN    heparin (porcine) injection 5,000 Units Q12H    insulin aspart U-100 pen 0-5 Units QID (AC + HS) PRN    insulin aspart U-100 pen 5 Units TIDWM    insulin detemir U-100 pen 15 Units Daily    lactulose 10 gram/15 mL solution (enema) 200 g TID    levothyroxine tablet 137 mcg Before breakfast     midodrine tablet 10 mg TID    midodrine tablet 15 mg PRN    mycophenolate capsule 250 mg BID    ondansetron disintegrating tablet 4 mg Q6H PRN    oxyCODONE immediate release tablet 5 mg Q6H PRN    polyethylene glycol packet 17 g BID    predniSONE tablet 5 mg Daily    QUEtiapine tablet 50 mg QHS    tacrolimus capsule 5 mg BID       Objective:     Vital Signs (Most Recent):  Temp: 97.6 °F (36.4 °C) (05/11/18 0645)  Pulse: (!) 119 (05/11/18 0915)  Resp: (!) 24 (05/11/18 0543)  BP: 124/69 (05/11/18 0915)  SpO2: 98 % (05/11/18 0559)  O2 Device (Oxygen Therapy): nasal cannula (05/11/18 0645) Vital Signs (24h Range):  Temp:  [97.6 °F (36.4 °C)-98.4 °F (36.9 °C)] 97.6 °F (36.4 °C)  Pulse:  [107-123] 119  Resp:  [16-24] 24  SpO2:  [75 %-98 %] 98 %  BP: (107-138)/(57-88) 124/69     Weight: 79.1 kg (174 lb 6.1 oz) (05/11/18 0400)  Body mass index is 27.31 kg/m².  Body surface area is 1.93 meters squared.    I/O last 3 completed shifts:  In: 1764.7 [P.O.:1140; I.V.:24.7; NG/GT:600]  Out: 300 [Other:300]    Physical Exam   Constitutional: He appears well-developed. No distress.   Trach removed   HENT:   Head: Normocephalic and atraumatic.   Eyes: Conjunctivae and EOM are normal.   Cardiovascular: Regular rhythm.  Tachycardia present.    Pulmonary/Chest: He has no wheezes. He has no rales.   L IJ TDC   Abdominal: Soft. He exhibits no distension.   LUQ PEG removed  ascites   Neurological: He is alert.   Skin:   Right necrotic toes 1-3  Left great toe necrotic-same       Significant Labs:  All labs within the past 24 hours have been reviewed.     Significant Imaging:  Labs: Reviewed    Assessment/Plan:     CACHORRO (acute kidney injury) with ATN superimposed CKD 3     baseline sCr 2.6-3.0 consistent with CKD stage IV  - anuric CACHORRO likely ischemic ATN from prolonged pre-renal state (diarrhea) in setting of prograf renal vasoconstriction; cannot r/o septic ATN or Prograf toxicity  - SLED started 3/14. TDC placed 4/4/18.   -  remains anuric  - Continue epo  -Trach and PEG removed 5/10. Given cough and vomiting with oxygen desat last night , obtain CXR-ordered  -5/11 Remains anuric. Patient seen in [facility]. Tolerating 2 L UF.  Dialysate adjusted. Albumin and midodrine PRN. Continue MWF HD while inpatient.      Ascites  -Reports PTA paracentesis every 1-2 months with 2-3 L removal each time.   -IR Paracentesis not done (report pending)- Defer to primary.     Anemia of CKD  -Iron 70, T sat 35, TIBC 225 and ferritan 1287  -Continue epo. Increased epo 5/4     BMD  Lab Results   Component Value Date    PTH 23.0 04/19/2018    CALCIUM 9.8 05/11/2018    CAION 0.98 (L) 03/17/2018    PHOS 3.5 05/11/2018   Renal diet.                   Thank you for your consult. I will follow-up with patient. Please contact us if you have any additional questions.    Amanda Cuellar NP  Nephrology  [facility]

## 2018-05-11 NOTE — PT/OT/SLP PROGRESS
Occupational Therapy      Patient Name:  Lv Crocker   MRN:  [id number]    Patient not seen today secondary to Dialysis (OT unable to return in PM). Will follow-up next scheduled OT session.    Leonor Wagner, OT  5/11/2018

## 2018-05-11 NOTE — PT/OT/SLP PROGRESS
Physical Therapy      Patient Name:  Lv Crocker   MRN:  [id number]    Patient not seen today secondary to Dialysis. Will follow-up next scheduled date.    Corey Hernández, PT   5/11/2018

## 2018-05-11 NOTE — PROGRESS NOTES
Discharge planning f/u    Received call from [facility], Paolo ph [phone] x [phone], stating pt will need financial and medical approval before pt can be accepted and she is pushing his possible acceptance date back to 5/17. Paolo also states that their Medical Director, Dr. Zhao, ph [phone], wants to speak to Nephrologist following pt. SW paged Dr. King; awaiting call back.    Addendum:     Faxed new hep panel results to [facility], attention Paolo.     Now paging Dr. Garcia in Nephrology. Awaiting call back.    Addendum:  Was able to reach Amanda Cuellar NP for Nephrology, [phone] and provided her with phone # for Dr. Coello of [facility].    Addendum:  Amanda Cuellar NP spoke to Dr. Coello and informed SW that he does not feel pt is medically appropriate for out pt dialysis at this time. Appreciate the assistance of NP.

## 2018-05-11 NOTE — PROGRESS NOTES
D/C PLANNING NOTE    GUS faxed IPR referrals to [facility] (ph: [phone], f: [phone]) and [facility] (ph: [phone], f: [phone]) on 5/11, per request of pt and wife.  Per Maria Ines at [facility], they use [facility] for HD and pt would need to be on TTS schedule.  Per [facility], they would coordinate with pt's home HD unit for HD while pt is at [facility]  GUS faxed HD referral [facility] (ph: [phone], f: [phone]) for chair placement.  GUS requested new orders for TB skin test and Hep panel from Dr. CHRIS Paris, as pt's previous results are now >30 days old.  SW will f/u with facilities re: ability to accept pt and will f/u with [facility] re: HD chair placement.  SW remains available.

## 2018-05-11 NOTE — ASSESSMENT & PLAN NOTE
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 No surgical intervention indicated.   - Wound care following. Local care only at this point.

## 2018-05-11 NOTE — PROGRESS NOTES
[facility]  Heart Transplant  Progress Note    Patient Name: Lv Crocker  MRN: [id number]  Admission Date: 3/11/2018  Hospital Length of Stay: 60 days  Attending Physician: Jose A Lloyd MD  Primary Care Provider: Philippe Mohr MD  Principal Problem:Acute respiratory failure with hypoxia    Subjective:     Interval History: Continues to do well. Working with PT/OT and speech. Dialysis this AM.     Continuous Infusions:    Scheduled Meds:   sodium chloride 0.9%   Intravenous Once    sodium chloride 0.9%   Intravenous Once    acetaminophen  650 mg Oral TID    cetirizine  5 mg Oral Daily    docusate sodium  100 mg Oral BID    epoetin jose miguel (PROCRIT) injection  100 Units/kg (Dosing Weight) Intravenous Every Mon, Wed, Fri    escitalopram oxalate  20 mg Oral Daily    famotidine  20 mg Oral QHS    guaiFENesin  1,200 mg Oral BID    heparin (porcine)  5,000 Units Subcutaneous Q12H    insulin aspart U-100  5 Units Subcutaneous TIDWM    insulin detemir U-100  15 Units Subcutaneous Daily    lactulose  200 g Rectal TID    levothyroxine  137 mcg Oral Before breakfast    midodrine  10 mg Oral TID    mycophenolate  250 mg Oral BID    polyethylene glycol  17 g Oral BID    predniSONE  5 mg Oral Daily    QUEtiapine  50 mg Oral QHS    tacrolimus  5 mg Oral BID     PRN Meds:sodium chloride, sodium chloride 0.9%, sodium chloride 0.9%, sodium chloride 0.9%, albuterol-ipratropium 2.5mg-0.5mg/3mL, ALPRAZolam, benzonatate, calcium carbonate, dextrose 50%, dextrose 50%, glucagon (human recombinant), glucose, glucose, heparin (porcine), insulin aspart U-100, midodrine, ondansetron, oxyCODONE    Review of patient's allergies indicates:   Allergen Reactions    No known drug allergies      Objective:     Vital Signs (Most Recent):  Temp: 97.6 °F (36.4 °C) (05/11/18 0645)  Pulse: (!) 119 (05/11/18 0900)  Resp: (!) 24 (05/11/18 0543)  BP: 119/65 (05/11/18 0900)  SpO2: 98 % (05/11/18 0559) Vital  Signs (24h Range):  Temp:  [97.6 °F (36.4 °C)-98.4 °F (36.9 °C)] 97.6 °F (36.4 °C)  Pulse:  [107-123] 119  Resp:  [16-24] 24  SpO2:  [75 %-98 %] 98 %  BP: (107-138)/(57-88) 119/65     Patient Vitals for the past 72 hrs (Last 3 readings):   Weight   05/11/18 0400 79.1 kg (174 lb 6.1 oz)   05/10/18 1400 79 kg (174 lb 2.6 oz)   05/10/18 0400 79 kg (174 lb 2.6 oz)     Body mass index is 27.31 kg/m².    Intake/Output Summary (Last 24 hours) at 05/11/18 0908  Last data filed at 05/11/18 0600   Gross per 24 hour   Intake              780 ml   Output              300 ml   Net              480 ml     Physical Exam   Constitutional: He is oriented to person, place, and time. He appears well-developed and well-nourished.   Neck: Normal range of motion. Neck supple. No JVD present.   Cardiovascular: Normal rate and regular rhythm.  Exam reveals no gallop and no friction rub.    No murmur heard.  Pulmonary/Chest: Effort normal and breath sounds normal. He has no wheezes. He has no rales. Stoma. C/D/I.   Abdominal: Soft. Bowel sounds are normal. There is no tenderness. PEG tube removed. Site appears C/D/I.   Musculoskeletal: He exhibits no edema.   Neurological: He is alert and oriented to person, place, and time.   Skin: Skin is warm and dry.   RLE- 1st and 3rd gangrenous toes     Significant Labs:  CBC:    Recent Labs  Lab 05/09/18  0500 05/10/18  0500 05/11/18  0507   WBC 6.19 6.88 9.34   RBC 2.98* 2.90* 2.97*   HGB 8.8* 8.5* 8.7*   HCT 28.5* 28.9* 29.6*   * 365* 377*   MCV 96 100* 100*   MCH 29.5 29.3 29.3   MCHC 30.9* 29.4* 29.4*     CMP:    Recent Labs  Lab 05/09/18  0500 05/10/18  0500 05/11/18  0507   * 149* 142*   CALCIUM 8.2* 8.6* 9.8   ALBUMIN 2.0* 2.1* 2.4*   PROT 5.7* 5.9* 6.7   * 137 133*   K 3.7 3.7 4.8   CO2 23 25 23    102 96   BUN 37* 22* 33*   CREATININE 2.9* 2.1* 2.9*   ALKPHOS 153* 151* 166*   ALT 8* 10 11   AST 11 20 20   BILITOT 0.3 0.4 0.4      Microbiology:  Microbiology Results  (last 7 days)     Procedure Component Value Units Date/Time    Aerobic culture [[id number]] Collected:  05/10/18 1411    Order Status:  Completed Specimen:  Ascites from Ascites Updated:  05/11/18 0805     Aerobic Bacterial Culture No growth    Culture, Anaerobe [[id number]] Collected:  05/10/18 1411    Order Status:  Completed Specimen:  Ascites from Ascites Updated:  05/11/18 0739     Anaerobic Culture Culture in progress    Blood culture [[id number]] Collected:  05/05/18 0644    Order Status:  Completed Specimen:  Blood from Midline, Basilic, Right Updated:  05/10/18 1012     Blood Culture, Routine No growth after 5 days.    Blood culture [[id number]] Collected:  05/05/18 0812    Order Status:  Completed Specimen:  Blood Updated:  05/10/18 1012     Blood Culture, Routine No growth after 5 days.    Narrative:       Collection has been rescheduled by CDP at 5/5/2018 07:40 Reason: Due   now  Collection has been rescheduled by CDP at 5/5/2018 07:40 Reason: Due   now    AFB Culture & Smear [[id number]] Collected:  03/28/18 1607    Order Status:  Completed Specimen:  Body Fluid from Lung, RLL Updated:  05/10/18 0927     AFB Culture & Smear Culture in progress     AFB Culture & Smear Culture in progress     AFB CULTURE STAIN No acid fast bacilli seen.    Respiratory Viral Panel by PCR [facility]; Nasal Swab [[id number]] Collected:  05/05/18 0644    Order Status:  Completed Specimen:  Respiratory Updated:  05/08/18 1138     Respiratory Virus Panel, source Nasal Swab     RVP - Adenovirus Not Detected     Comment: Detects Serotypes B and E. Detection of Serotype C may   be limited. If Adenovirus infection is suspected and a   Not Detected result is returned the sample should be   re-tested for Adenovirus using an independent method  (e.g. [employer] Adenovirus Quantitative Real-Time  PCR test.          Enterovirus Not Detected     Comment: Cross-reactivity has been observed between certain Rhinovirus  strains and the  "Enterovirus assay.          Human Bocavirus Not Detected     Human Coronavirus Not Detected     Comment: The Human Coronavirus assay detects Human coronavirus types  229E, OC43,NL63 and HKU1.          RVP - Human Metapneumovirus (hMPV) Not Detected     RVP - Influenza A Not Detected     Influenza A - I7P1-28 Not Detected     RVP - Influenza B Not Detected     Parainfluenza Not Detected     Respiratory Syncytial VirusVirus (RSV) A Not Detected     Comment: The Respiratory Syncytial Viral assay detects types A and B,  however it does not distinguish between the two.          RVP - Rhinovirus Not Detected     Comment: Cross-Reactivity has been observed between certain   Rhinovirus strains and the Enterovirus assay.  Target Enriched Mulitplex Polymerase Chain Reaction (TEM-PCR)  allows for the detection of multiple pathogens out of a single  reaction.  This test was developed and its performance   characteristics determined by [employer].  It has not   been cleared or approved by the U.S.Food and Drug Administration.  Results should be used in conjunction with clinical findings,   and should not form the sole basis for a diagnosis or treatment  decision.  TEM-PCR is a licensed technology of [employer].         Narrative:       Receiving Lab:->[facility]    Culture, Respiratory with Gram Stain [[id number]]     Order Status:  Canceled Specimen:  Respiratory     Blood culture [[id number]]     Order Status:  Canceled Specimen:  Blood     Culture, Respiratory with Gram Stain [[id number]]     Order Status:  Canceled Specimen:  Respiratory from Tracheal Aspirate         I have reviewed all pertinent labs within the past 24 hours.    Estimated Creatinine Clearance: 30.4 mL/min (A) (based on SCr of 2.9 mg/dL (H)).    Diagnostic Results:  ABD US (5/8/18): "Moderate volume ascites. Bilateral pleural effusions."    Assessment and Plan:     41 yo male S/P OHTx 11/18/2014, suspected restrictive versus constrictive " CMP post-transplant as well as CKD stage IV that has resulted in ascites/pleural effusion, was getting monthly paracentesis and thoracentesis. Most recently has had ongoing issues with his lungs, had gotten 2 thoracenteses, after which he underwent VATS 01/19/18 followed by pleurex catheter placement and effusion drainage 01/11/18, subsequently had it removed 02/07/18 after drainage had decreased despite multiple attempts to drain it. Has been having significant coughing fits that result in him being near-syncopal at times, although difficult to say if he has passed out or not- patient most recently was admitted to the hospital for increased AGUILAR, coughing and pre-syncope [place]11[place] [facility]   Patient was started on antibiotics for suspected bacterial infection, with some diarrhea, bronchitis, and possible pneumonia. Ct chest showed some pleural fluid collection and after talking with Dr. James, we arranged for him to receive a diagnostic tap with IR, from which the fluid collection demonstrated no growth or significant findings at all really. Cell counts do not appear to have been sent but will recheck. Patient states since his hospital stay, yesterday had a significant coughing fit again, after which he nearly passed out, is being seen in clinic today for this. Denies any cardiopulmonary complaints, no leg swelling, has some abdominal swelling, which is moderate for him. Denies significant shortness of breath with mild exertion, had 6MWT yesterday to see if he qualified for home O2, and his O2 sats actually improved after recovery from where he started initially. He and his wife admit he is in bed most days, not very active.     Mr. Crocker presented to the ED 3/11/18 with approximately 3 weeks of worsening diarrhea and 2-3 days of sinus pressure, drainage, and worsened cough.  He notes that he had a coughing fit last night and passed out, coughed throughout most of the night.  This also happened on  2/25/18.  He last saw Dr Ferreira in clinic on 2/20/18 where he was taken off myfortic and switched to cellcept (he hadn't been on cellcept because of leukopenia when they tried post-transplant).  Though his diarrhea had improved after his last discharge, he states it has increased now to 15x/day, clear/yellow/green, no fevers, no exacerbating foods per say.  He was taken back off the cellcept and placed back on myfortic this past week and has not noticed immediate change in diarrhea. He also reports his baseline coughing fits havent improved and are minimally responsive to tessalon pearls.  Came on last night, he lost consciousness during a fit once which is relatively normal for him, and had two more during HPI.  He notes no sick contacts but does state he's had some URI symptoms as above.  His baseline vitals are usually -120 and BP 90s/60s-110s/70s.  He reports a history of intermittent diarrhea - did have Cdiff many years ago but this smells different.  No abdominal pain, no nausea, no vomiting.  No blood in diarrhea.  Appears last c-scope in 2015 with no evidence of infection or inflammatory processes.  He does report IBS which is different that this.       * Acute respiratory failure with hypoxia    - Resolved. Now with trach decannulation  - Etiology = RSV   - Stoma care / education provided by pulmonology   - S/P Bronch and intubation 3/14 now post tracheostomy due to inability to extubate  - Appreciate PT/OT/Wound care.        Heart transplanted    - TTE 4/24/18 showed normal graft function but has known history of restrictive CM post transplant.  - Continue Prednisone 5mg daily and Cellcept 250mg BID   - Continue Tacrolimus 5/5 with plan to adjust as needed for goal 6-10  - Tacro level pending this AM         Abdominal distention    - Paracentesis done.   - Peg tube removed.         Type 1 diabetes mellitus with stage 3 chronic kidney disease    - Appreciate Endocrine's recs  - Insulin gtt per  endocrine recs. Adjust as needed  - Per endocrine please call when he is off tube feeds to adjust ggt and transition to rehab/outpatient regimen        Hypothyroidism due to Hashimoto's thyroiditis    - Appreciate Endocrine's recs  - Switched to PO Levothyroxine 137mcg daily        Alteration in skin integrity    - Dry gangrene on toes. No surgical intervention indicated.   - Wound care following. Local care only at this point.         ESRD (end stage renal disease)    - Appreciate Nephrology recs.   - Continue midodrine  - HD per nephrology SAVANNA ZARATE         Restrictive cardiomyopathy    - No changes (see above)         Anemia    - Nephrology is managing ProCrit   - Hgb stable         Debility    - PT/OT/SLP daily. Recommending rehab.  - Nutrition following. Regular diet with thin liquids  - Passed MBSS with speech.          Anxiety    - Increased Lexapro to 20mg daily  - Continue seroquel 50 QHS   - Continue .5mg xanax PRN Q8H   - Appreciate psychiatry input.             Mamadou rhodes, DO  Heart Transplant  [facility]

## 2018-05-11 NOTE — SUBJECTIVE & OBJECTIVE
Interval History: Seen in [facility]. Tolerated 2 L net UF. Coughing spell last night leading to oxygen desat 75%  then improved 3 L NC. Vomitted Currently on RA.   Trach and PEG removed. Per patient,  IR paracentesis not done? (report pending).     Review of patient's allergies indicates:   Allergen Reactions    No known drug allergies      Current Facility-Administered Medications   Medication Frequency    0.9%  NaCl infusion (for blood administration) Q24H PRN    0.9%  NaCl infusion PRN    0.9%  NaCl infusion PRN    0.9%  NaCl infusion Once    0.9%  NaCl infusion PRN    0.9%  NaCl infusion Once    acetaminophen tablet 650 mg TID    albuterol-ipratropium 2.5mg-0.5mg/3mL nebulizer solution 3 mL Q4H PRN    ALPRAZolam tablet 0.5 mg Q6H PRN    benzonatate capsule 100 mg TID PRN    calcium carbonate 200 mg calcium (500 mg) chewable tablet 500 mg BID PRN    cetirizine tablet 5 mg Daily    dextrose 50% injection 12.5 g PRN    dextrose 50% injection 25 g PRN    docusate sodium capsule 100 mg BID    epoetin jose miguel injection 8,100 Units Every Mon, Wed, Fri    escitalopram oxalate tablet 20 mg Daily    famotidine tablet 20 mg QHS    glucagon (human recombinant) injection 1 mg PRN    glucose chewable tablet 16 g PRN    glucose chewable tablet 24 g PRN    guaiFENesin 12 hr tablet 1,200 mg BID    heparin (porcine) injection 1,000 Units PRN    heparin (porcine) injection 5,000 Units Q12H    insulin aspart U-100 pen 0-5 Units QID (AC + HS) PRN    insulin aspart U-100 pen 5 Units TIDWM    insulin detemir U-100 pen 15 Units Daily    lactulose 10 gram/15 mL solution (enema) 200 g TID    levothyroxine tablet 137 mcg Before breakfast    midodrine tablet 10 mg TID    midodrine tablet 15 mg PRN    mycophenolate capsule 250 mg BID    ondansetron disintegrating tablet 4 mg Q6H PRN    oxyCODONE immediate release tablet 5 mg Q6H PRN    polyethylene glycol packet 17 g BID    predniSONE tablet 5 mg Daily     QUEtiapine tablet 50 mg QHS    tacrolimus capsule 5 mg BID       Objective:     Vital Signs (Most Recent):  Temp: 97.6 °F (36.4 °C) (05/11/18 0645)  Pulse: (!) 119 (05/11/18 0915)  Resp: (!) 24 (05/11/18 0543)  BP: 124/69 (05/11/18 0915)  SpO2: 98 % (05/11/18 0559)  O2 Device (Oxygen Therapy): nasal cannula (05/11/18 0645) Vital Signs (24h Range):  Temp:  [97.6 °F (36.4 °C)-98.4 °F (36.9 °C)] 97.6 °F (36.4 °C)  Pulse:  [107-123] 119  Resp:  [16-24] 24  SpO2:  [75 %-98 %] 98 %  BP: (107-138)/(57-88) 124/69     Weight: 79.1 kg (174 lb 6.1 oz) (05/11/18 0400)  Body mass index is 27.31 kg/m².  Body surface area is 1.93 meters squared.    I/O last 3 completed shifts:  In: 1764.7 [P.O.:1140; I.V.:24.7; NG/GT:600]  Out: 300 [Other:300]    Physical Exam   Constitutional: He appears well-developed. No distress.   Trach removed   HENT:   Head: Normocephalic and atraumatic.   Eyes: Conjunctivae and EOM are normal.   Cardiovascular: Regular rhythm.  Tachycardia present.    Pulmonary/Chest: He has no wheezes. He has no rales.   L IJ TDC   Abdominal: Soft. He exhibits no distension.   LUQ PEG removed  ascites   Neurological: He is alert.   Skin:   Right necrotic toes 1-3  Left great toe necrotic-same       Significant Labs:  All labs within the past 24 hours have been reviewed.     Significant Imaging:  Labs: Reviewed

## 2018-05-11 NOTE — PROGRESS NOTES
Pt had some wheezing this morning and sp02 of 75% on room air. I placed a nasal cannula at 3 liters on pt and had the respiratory therapist administer a prn respiratory treatment. Pt is now at 96% on 3 liters and sounds clearer.

## 2018-05-11 NOTE — SUBJECTIVE & OBJECTIVE
Interval History: Continues to do well. Working with PT/OT and speech. Dialysis this AM.     Continuous Infusions:    Scheduled Meds:   sodium chloride 0.9%   Intravenous Once    sodium chloride 0.9%   Intravenous Once    acetaminophen  650 mg Oral TID    cetirizine  5 mg Oral Daily    docusate sodium  100 mg Oral BID    epoetin jose miguel (PROCRIT) injection  100 Units/kg (Dosing Weight) Intravenous Every Mon, Wed, Fri    escitalopram oxalate  20 mg Oral Daily    famotidine  20 mg Oral QHS    guaiFENesin  1,200 mg Oral BID    heparin (porcine)  5,000 Units Subcutaneous Q12H    insulin aspart U-100  5 Units Subcutaneous TIDWM    insulin detemir U-100  15 Units Subcutaneous Daily    lactulose  200 g Rectal TID    levothyroxine  137 mcg Oral Before breakfast    midodrine  10 mg Oral TID    mycophenolate  250 mg Oral BID    polyethylene glycol  17 g Oral BID    predniSONE  5 mg Oral Daily    QUEtiapine  50 mg Oral QHS    tacrolimus  5 mg Oral BID     PRN Meds:sodium chloride, sodium chloride 0.9%, sodium chloride 0.9%, sodium chloride 0.9%, albuterol-ipratropium 2.5mg-0.5mg/3mL, ALPRAZolam, benzonatate, calcium carbonate, dextrose 50%, dextrose 50%, glucagon (human recombinant), glucose, glucose, heparin (porcine), insulin aspart U-100, midodrine, ondansetron, oxyCODONE    Review of patient's allergies indicates:   Allergen Reactions    No known drug allergies      Objective:     Vital Signs (Most Recent):  Temp: 97.6 °F (36.4 °C) (05/11/18 0645)  Pulse: (!) 119 (05/11/18 0900)  Resp: (!) 24 (05/11/18 0543)  BP: 119/65 (05/11/18 0900)  SpO2: 98 % (05/11/18 0559) Vital Signs (24h Range):  Temp:  [97.6 °F (36.4 °C)-98.4 °F (36.9 °C)] 97.6 °F (36.4 °C)  Pulse:  [107-123] 119  Resp:  [16-24] 24  SpO2:  [75 %-98 %] 98 %  BP: (107-138)/(57-88) 119/65     Patient Vitals for the past 72 hrs (Last 3 readings):   Weight   05/11/18 0400 79.1 kg (174 lb 6.1 oz)   05/10/18 1400 79 kg (174 lb 2.6 oz)   05/10/18 0400  79 kg (174 lb 2.6 oz)     Body mass index is 27.31 kg/m².    Intake/Output Summary (Last 24 hours) at 05/11/18 0908  Last data filed at 05/11/18 0600   Gross per 24 hour   Intake              780 ml   Output              300 ml   Net              480 ml     Physical Exam   Constitutional: He is oriented to person, place, and time. He appears well-developed and well-nourished.   Neck: Normal range of motion. Neck supple. No JVD present.   Cardiovascular: Normal rate and regular rhythm.  Exam reveals no gallop and no friction rub.    No murmur heard.  Pulmonary/Chest: Effort normal and breath sounds normal. He has no wheezes. He has no rales.   Trach in place   Abdominal: Soft. Bowel sounds are normal. He exhibits distension. There is no tenderness.   Musculoskeletal: He exhibits no edema.   Neurological: He is alert and oriented to person, place, and time.   Skin: Skin is warm and dry.   RLE- 1st and 3rd gangrenous toes     Significant Labs:  CBC:    Recent Labs  Lab 05/09/18  0500 05/10/18  0500 05/11/18  0507   WBC 6.19 6.88 9.34   RBC 2.98* 2.90* 2.97*   HGB 8.8* 8.5* 8.7*   HCT 28.5* 28.9* 29.6*   * 365* 377*   MCV 96 100* 100*   MCH 29.5 29.3 29.3   MCHC 30.9* 29.4* 29.4*     CMP:    Recent Labs  Lab 05/09/18  0500 05/10/18  0500 05/11/18  0507   * 149* 142*   CALCIUM 8.2* 8.6* 9.8   ALBUMIN 2.0* 2.1* 2.4*   PROT 5.7* 5.9* 6.7   * 137 133*   K 3.7 3.7 4.8   CO2 23 25 23    102 96   BUN 37* 22* 33*   CREATININE 2.9* 2.1* 2.9*   ALKPHOS 153* 151* 166*   ALT 8* 10 11   AST 11 20 20   BILITOT 0.3 0.4 0.4      Microbiology:  Microbiology Results (last 7 days)     Procedure Component Value Units Date/Time    Aerobic culture [[id number]] Collected:  05/10/18 1411    Order Status:  Completed Specimen:  Ascites from Ascites Updated:  05/11/18 0805     Aerobic Bacterial Culture No growth    Culture, Anaerobe [[id number]] Collected:  05/10/18 1411    Order Status:  Completed Specimen:  Ascites from  Ascites Updated:  05/11/18 0739     Anaerobic Culture Culture in progress    Blood culture [[id number]] Collected:  05/05/18 0644    Order Status:  Completed Specimen:  Blood from Midline, Basilic, Right Updated:  05/10/18 1012     Blood Culture, Routine No growth after 5 days.    Blood culture [[id number]] Collected:  05/05/18 0812    Order Status:  Completed Specimen:  Blood Updated:  05/10/18 1012     Blood Culture, Routine No growth after 5 days.    Narrative:       Collection has been rescheduled by CDP at 5/5/2018 07:40 Reason: Due   now  Collection has been rescheduled by CDP at 5/5/2018 07:40 Reason: Due   now    AFB Culture & Smear [[id number]] Collected:  03/28/18 1607    Order Status:  Completed Specimen:  Body Fluid from Lung, RLL Updated:  05/10/18 0927     AFB Culture & Smear Culture in progress     AFB Culture & Smear Culture in progress     AFB CULTURE STAIN No acid fast bacilli seen.    Respiratory Viral Panel by PCR [facility]; Nasal Swab [[id number]] Collected:  05/05/18 0644    Order Status:  Completed Specimen:  Respiratory Updated:  05/08/18 1138     Respiratory Virus Panel, source Nasal Swab     RVP - Adenovirus Not Detected     Comment: Detects Serotypes B and E. Detection of Serotype C may   be limited. If Adenovirus infection is suspected and a   Not Detected result is returned the sample should be   re-tested for Adenovirus using an independent method  (e.g. ViracoXspand Adenovirus Quantitative Real-Time  PCR test.          Enterovirus Not Detected     Comment: Cross-reactivity has been observed between certain Rhinovirus  strains and the Enterovirus assay.          Human Bocavirus Not Detected     Human Coronavirus Not Detected     Comment: The Human Coronavirus assay detects Human coronavirus types  229E, OC43,NL63 and HKU1.          RVP - Human Metapneumovirus (hMPV) Not Detected     RVP - Influenza A Not Detected     Influenza A - S7P1-40 Not Detected     RVP - Influenza B Not Detected     " Parainfluenza Not Detected     Respiratory Syncytial VirusVirus (RSV) A Not Detected     Comment: The Respiratory Syncytial Viral assay detects types A and B,  however it does not distinguish between the two.          RVP - Rhinovirus Not Detected     Comment: Cross-Reactivity has been observed between certain   Rhinovirus strains and the Enterovirus assay.  Target Enriched Mulitplex Polymerase Chain Reaction (TEM-PCR)  allows for the detection of multiple pathogens out of a single  reaction.  This test was developed and its performance   characteristics determined by [employer].  It has not   been cleared or approved by the U.[place].Food and Drug Administration.  Results should be used in conjunction with clinical findings,   and should not form the sole basis for a diagnosis or treatment  decision.  TEM-PCR is a licensed technology of [employer].         Narrative:       Receiving Lab:->[facility]    Culture, Respiratory with Gram Stain [[id number]]     Order Status:  Canceled Specimen:  Respiratory     Blood culture [[id number]]     Order Status:  Canceled Specimen:  Blood     Culture, Respiratory with Gram Stain [[id number]]     Order Status:  Canceled Specimen:  Respiratory from Tracheal Aspirate         I have reviewed all pertinent labs within the past 24 hours.    Estimated Creatinine Clearance: 30.4 mL/min (A) (based on SCr of 2.9 mg/dL (H)).    Diagnostic Results:  ABD US (5/8/18): "Moderate volume ascites. Bilateral pleural effusions."  "

## 2018-05-11 NOTE — ASSESSMENT & PLAN NOTE
- TTE 4/24/18 showed normal graft function but has known history of restrictive CM post transplant.  - Continue Prednisone 5mg daily and Cellcept 250mg BID   - Continue Tacrolimus 5/5 with plan to adjust as needed for goal 6-10  - Tacro level pending this AM

## 2018-05-12 NOTE — PROGRESS NOTES
Contacted Lc Muniz MD to verify bipap orders, pt has vomited several times today and is believed to have aspiration pneumonia. Discussed risks and contraindications for Bipap, orders to remain the same at this time.

## 2018-05-12 NOTE — PLAN OF CARE
Problem: Patient Care Overview  Goal: Plan of Care Review  Outcome: Ongoing (interventions implemented as appropriate)  POC reviewed w/ pt and spouse this am to include increasing activity, wound care, blood glucose management, promoting healthy respiratory functioning, and IV antibiotics.  Pt doing better today compare to yesterday.  Pt switched from bipap to HF NC, started at 8L weaned to 4L this afternoon - sating high 90's.  Pt strongly encouraged to do IS multiple times per hour - needs reinforcement.  Pt repositioned in bed frequently, sat up in chair for about 2 hours today.  Pt got his hair cut by spouse and RN.  Pt's sacral DTI almost healed, closed w/ granulation tissue present.  Pt's RLQ PEG site and para site dressing removed today, left LETICIA.  Pt's trach dressing changed today.  Pt's blood glucose elevated today, appetite improved from yesterday, insulin regimen adjusted per endocrine.  Pt receiving IV antibiotics - flagyl and moxifloxacin.  PPD to RFA to be read this pm.  Para scheduled per primary team for 5/15/18.

## 2018-05-12 NOTE — ASSESSMENT & PLAN NOTE
- Resolved. Now with trach decannulation  - Etiology = RSV   - Stoma care / education provided by pulmonology   - Continue pulmonary toilet to help with respiratory mechanics   - Appreciate PT/OT/Wound care.  - Unclear if this is true aspiration or worsening edema  - Patient should not be placed back on BiPAP given recent stoma and trach decanulation.  - HFNC at 8LPM   - Will ask nephrology to take additional fluid off

## 2018-05-12 NOTE — SIGNIFICANT EVENT
Patient noted to be tachypneic by nursing saturating 95% on 3L NC  cxr from yesterday noted to have worsening left sided airspace consolidation -- risk of aspiration PNA given cough and emesis recently.    Added coverage for aspiration PNA with avelox and flagyl iv along with bipap  When resp status is more stable and bipap can be weaned to nasal cannula patient may be able to transition to oral abx for 7 day course.    Lc Muniz MD  PGY4 Cardiology

## 2018-05-12 NOTE — SIGNIFICANT EVENT
"Notified Dr. Muniz of patient's tachypnea (RR= 30bpm) and complaints of having difficulty breathing despite PRN Albuterol treatment. Oxygen saturation= 95% on 3L/min nasal cannula. Chest x-ray from yesterday demonstrated "worsening air space opacity on left"--likely aspiration pneumonia, given patient's cough and emesis. Orders received: Solumedrol 80 mg IVP q8hrs, Xopenex nebulizer treatment (given patient's tachycardia), Moxifloxacin 400 mg IVPB q24hrs, and Flagyl 500 mg IVPB q8hrs (for possible aspiration PNA). Also, orders to initiate continuous BiPap (see note per [facility]).   "

## 2018-05-12 NOTE — PROGRESS NOTES
[facility]  Heart Transplant  Progress Note    Patient Name: Lv Crocker  MRN: [id number]  Admission Date: 3/11/2018  Hospital Length of Stay: 61 days  Attending Physician: Jose A Lloyd MD  Primary Care Provider: Philippe Mohr MD  Principal Problem:Acute respiratory failure with hypoxia    Subjective:     Interval History: Worsening tachypnea and work of breathing overnight. Family very upset that covering night doctor did not come to bedside. IV steroids, ABX and BiPAP started overnight with mild improvement.     Continuous Infusions:    Scheduled Meds:   acetaminophen  650 mg Oral TID    cetirizine  5 mg Oral Daily    docusate sodium  100 mg Oral BID    epoetin jose miguel (PROCRIT) injection  100 Units/kg (Dosing Weight) Intravenous Every Mon, Wed, Fri    escitalopram oxalate  20 mg Oral Daily    famotidine  20 mg Oral QHS    guaiFENesin  1,200 mg Oral BID    heparin (porcine)  5,000 Units Subcutaneous Q12H    insulin aspart U-100  5 Units Subcutaneous TIDWM    [START ON 5/13/2018] insulin detemir U-100  16 Units Subcutaneous Daily    levalbuterol  0.63 mg Nebulization Q4H    levothyroxine  137 mcg Oral Before breakfast    methylPREDNISolone sodium succinate  80 mg Intravenous Q8H    metronidazole  500 mg Intravenous Q8H    midodrine  10 mg Oral TID    moxifloxacin  400 mg Intravenous Q24H    mycophenolate  250 mg Oral BID    polyethylene glycol  17 g Oral BID    QUEtiapine  50 mg Oral QHS    tacrolimus  5 mg Oral BID     PRN Meds:sodium chloride 0.9%, albuterol-ipratropium 2.5mg-0.5mg/3mL, ALPRAZolam, benzonatate, calcium carbonate, dextrose 50%, dextrose 50%, glucagon (human recombinant), glucose, glucose, heparin (porcine), insulin aspart U-100, midodrine, ondansetron, oxyCODONE    Review of patient's allergies indicates:   Allergen Reactions    No known drug allergies      Objective:     Vital Signs (Most Recent):  Temp: 98 °F (36.7 °C) (05/12/18 1130)  Pulse:  (!) 116 (05/12/18 1221)  Resp: 18 (05/12/18 1221)  BP: 109/61 (05/12/18 1130)  SpO2: 100 % (05/12/18 1221) Vital Signs (24h Range):  Temp:  [98 °F (36.7 °C)-99.3 °F (37.4 °C)] 98 °F (36.7 °C)  Pulse:  [113-129] 116  Resp:  [16-23] 18  SpO2:  [95 %-100 %] 100 %  BP: ()/(58-67) 109/61     Patient Vitals for the past 72 hrs (Last 3 readings):   Weight   05/11/18 0400 79.1 kg (174 lb 6.1 oz)   05/10/18 1400 79 kg (174 lb 2.6 oz)   05/10/18 0400 79 kg (174 lb 2.6 oz)     Body mass index is 27.31 kg/m².    Intake/Output Summary (Last 24 hours) at 05/12/18 1447  Last data filed at 05/12/18 0501   Gross per 24 hour   Intake              440 ml   Output                0 ml   Net              440 ml     Physical Exam   Constitutional: He is oriented to person, place, and time. He appears well-developed and well-nourished.   Neck: Normal range of motion. Neck supple. No JVD present.   Cardiovascular: Normal rate and regular rhythm.  Exam reveals no gallop and no friction rub.    No murmur heard.  Pulmonary/Chest: Effort normal and breath sounds normal. He has no wheezes. He has no rales.   Trach in place   Abdominal: Soft. Bowel sounds are normal. He exhibits distension. There is no tenderness.   Musculoskeletal: He exhibits no edema.   Neurological: He is alert and oriented to person, place, and time.   Skin: Skin is warm and dry.   RLE- 1st and 3rd gangrenous toes     Significant Labs:  CBC:    Recent Labs  Lab 05/10/18  0500 05/11/18  0507 05/12/18  0500   WBC 6.88 9.34 9.49   RBC 2.90* 2.97* 2.82*   HGB 8.5* 8.7* 8.4*   HCT 28.9* 29.6* 28.6*   * 377* 281   * 100* 101*   MCH 29.3 29.3 29.8   MCHC 29.4* 29.4* 29.4*     CMP:    Recent Labs  Lab 05/10/18  0500 05/11/18  0507 05/12/18  0500   * 142* 274*   CALCIUM 8.6* 9.8 9.2   ALBUMIN 2.1* 2.4* 2.2*   PROT 5.9* 6.7 6.2    133* 135*   K 3.7 4.8 4.4   CO2 25 23 18*    96 99   BUN 22* 33* 17   CREATININE 2.1* 2.9* 2.2*   ALKPHOS 151* 166*  162*   ALT 10 11 10   AST 20 20 17   BILITOT 0.4 0.4 0.5      Microbiology:  Microbiology Results (last 7 days)     Procedure Component Value Units Date/Time    Aerobic culture [[id number]] Collected:  05/10/18 1411    Order Status:  Completed Specimen:  Ascites from Ascites Updated:  05/11/18 0805     Aerobic Bacterial Culture No growth    Culture, Anaerobe [[id number]] Collected:  05/10/18 1411    Order Status:  Completed Specimen:  Ascites from Ascites Updated:  05/11/18 0739     Anaerobic Culture Culture in progress    Blood culture [[id number]] Collected:  05/05/18 0644    Order Status:  Completed Specimen:  Blood from Midline, Basilic, Right Updated:  05/10/18 1012     Blood Culture, Routine No growth after 5 days.    Blood culture [[id number]] Collected:  05/05/18 0812    Order Status:  Completed Specimen:  Blood Updated:  05/10/18 1012     Blood Culture, Routine No growth after 5 days.    Narrative:       Collection has been rescheduled by CDP at 5/5/2018 07:40 Reason: Due   now  Collection has been rescheduled by CDP at 5/5/2018 07:40 Reason: Due   now    AFB Culture & Smear [[id number]] Collected:  03/28/18 1607    Order Status:  Completed Specimen:  Body Fluid from Lung, RLL Updated:  05/10/18 0927     AFB Culture & Smear Culture in progress     AFB Culture & Smear Culture in progress     AFB CULTURE STAIN No acid fast bacilli seen.    Respiratory Viral Panel by PCR [facility]; Nasal Swab [[id number]] Collected:  05/05/18 0644    Order Status:  Completed Specimen:  Respiratory Updated:  05/08/18 1138     Respiratory Virus Panel, source Nasal Swab     RVP - Adenovirus Not Detected     Comment: Detects Serotypes B and E. Detection of Serotype C may   be limited. If Adenovirus infection is suspected and a   Not Detected result is returned the sample should be   re-tested for Adenovirus using an independent method  (e.g. [employer] Adenovirus Quantitative Real-Time  PCR test.          Enterovirus Not Detected     " Comment: Cross-reactivity has been observed between certain Rhinovirus  strains and the Enterovirus assay.          Human Bocavirus Not Detected     Human Coronavirus Not Detected     Comment: The Human Coronavirus assay detects Human coronavirus types  229E, OC43,NL63 and HKU1.          RVP - Human Metapneumovirus (hMPV) Not Detected     RVP - Influenza A Not Detected     Influenza A - Q6U2-60 Not Detected     RVP - Influenza B Not Detected     Parainfluenza Not Detected     Respiratory Syncytial VirusVirus (RSV) A Not Detected     Comment: The Respiratory Syncytial Viral assay detects types A and B,  however it does not distinguish between the two.          RVP - Rhinovirus Not Detected     Comment: Cross-Reactivity has been observed between certain   Rhinovirus strains and the Enterovirus assay.  Target Enriched Mulitplex Polymerase Chain Reaction (TEM-PCR)  allows for the detection of multiple pathogens out of a single  reaction.  This test was developed and its performance   characteristics determined by [employer].  It has not   been cleared or approved by the U.S.Food and Drug Administration.  Results should be used in conjunction with clinical findings,   and should not form the sole basis for a diagnosis or treatment  decision.  TEM-PCR is a licensed technology of [employer].         Narrative:       Receiving Lab:->[facility]    Culture, Respiratory with Gram Stain [[id number]]     Order Status:  Canceled Specimen:  Respiratory         I have reviewed all pertinent labs within the past 24 hours.    Estimated Creatinine Clearance: 40.1 mL/min (A) (based on SCr of 2.2 mg/dL (H)).    Diagnostic Results:  CXR (5/11/18): "Worsening airspace consolidation throughout the left lung since the most recent prior examination of 05/05/2018 is observed.  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 Most recently has had ongoing issues with his lungs, had gotten 2 thoracenteses, after which he underwent VATS 01/19/18 followed by pleurex catheter placement and effusion drainage 01/11/18, subsequently had it removed 02/07/18 after drainage had decreased despite multiple attempts to drain it. Has been having significant coughing fits that result in him being near-syncopal at times, although difficult to say if he has passed out or not- patient most recently was admitted to the hospital for increased AGUILAR, coughing and pre-syncope 02/11-02/14/18 at [facility].   Patient was started on antibiotics for suspected bacterial infection, with some diarrhea, bronchitis, and possible pneumonia. Ct chest showed some pleural fluid collection and after talking with Dr. James, we arranged for him to receive a diagnostic tap with IR, from which the fluid collection demonstrated no growth or significant findings at all really. Cell counts do not appear to have been sent but will recheck. Patient states since his hospital stay, yesterday had a significant coughing fit again, after which he nearly passed out, is being seen in clinic today for this. Denies any cardiopulmonary complaints, no leg swelling, has some abdominal swelling, which is moderate for him. Denies significant shortness of breath with mild exertion, had 6MWT yesterday to see if he qualified for home O2, and his O2 sats actually improved after recovery from where he started initially. He and his wife admit he is in bed most days, not very active.     Mr. Crocker presented to the ED 3/11/18 with approximately 3 weeks of worsening diarrhea and 2-3 days of sinus pressure, drainage, and worsened cough.  He notes that he had a coughing fit last night and passed out, coughed  throughout most of the night.  This also happened on 2/25/18.  He last saw Dr Ferreira in clinic on 2/20/18 where he was taken off myfortic and switched to cellcept (he hadn't been on cellcept because of leukopenia when they tried post-transplant).  Though his diarrhea had improved after his last discharge, he states it has increased now to 15x/day, clear/yellow/green, no fevers, no exacerbating foods per say.  He was taken back off the cellcept and placed back on myfortic this past week and has not noticed immediate change in diarrhea. He also reports his baseline coughing fits havent improved and are minimally responsive to tessalon pearls.  Came on last night, he lost consciousness during a fit once which is relatively normal for him, and had two more during HPI.  He notes no sick contacts but does state he's had some URI symptoms as above.  His baseline vitals are usually -120 and BP 90s/60s-110s/70s.  He reports a history of intermittent diarrhea - did have Cdiff many years ago but this smells different.  No abdominal pain, no nausea, no vomiting.  No blood in diarrhea.  Appears last c-scope in 2015 with no evidence of infection or inflammatory processes.  He does report IBS which is different that this.       * Acute respiratory failure with hypoxia    - Resolved. Now with trach decannulation  - Etiology = RSV   - Stoma care / education provided by pulmonology   - Continue pulmonary toilet to help with respiratory mechanics   - Appreciate PT/OT/Wound care.  - Unclear if this is true aspiration or worsening edema  - Patient should not be placed back on BiPAP given recent stoma and trach decanulation.  - HFNC at 8LPM   - Will ask nephrology to take additional fluid off         Heart transplanted    - TTE 4/24/18 showed normal graft function but has known history of restrictive CM post transplant.  - Continue Prednisone 5mg daily and Cellcept 250mg BID   - Continue Tacrolimus 5/5 with plan to adjust as needed  for goal 6-10  - Tacro level pending this AM         Abdominal distention    - Repeat paracentesis requested for Tuesday 5/15  - Only 300mL removed with previous?         Type 1 diabetes mellitus with stage 3 chronic kidney disease    - Appreciate Endocrine's recs  - Insulin gtt per endocrine recs. Adjust as needed        Hypothyroidism due to Hashimoto's thyroiditis    - Appreciate Endocrine's recs  - Switched to PO Levothyroxine 137mcg daily        Alteration in skin integrity    - Dry gangrene on toes. No surgical intervention indicated.   - Wound care following. Local care only at this point.         ESRD (end stage renal disease)    - Appreciate Nephrology recs.   - Continue midodrine  - HD per nephrology [facility].         Restrictive cardiomyopathy    - No changes (see above)         Anemia    - Nephrology is managing ProCrit   - Hgb stable         Debility    - PT/OT/SLP daily. Recommending rehab.  - Nutrition following. Regular diet with thin liquids  - Passed MBSS with speech.          Anxiety    - Increased Lexapro to 20mg daily  - Continue seroquel 50 QHS   - Continue .5mg xanax PRN Q8H   - Appreciate psychiatry input.             Mamadou rhodes, DO  Heart Transplant  [facility]

## 2018-05-12 NOTE — SUBJECTIVE & OBJECTIVE
Interval History: Worsening tachypnea and work of breathing overnight. Family very upset that covering night doctor did not come to bedside. IV steroids, ABX and BiPAP started overnight with mild improvement.     Continuous Infusions:    Scheduled Meds:   acetaminophen  650 mg Oral TID    cetirizine  5 mg Oral Daily    docusate sodium  100 mg Oral BID    epoetin jose miguel (PROCRIT) injection  100 Units/kg (Dosing Weight) Intravenous Every Mon, Wed, Fri    escitalopram oxalate  20 mg Oral Daily    famotidine  20 mg Oral QHS    guaiFENesin  1,200 mg Oral BID    heparin (porcine)  5,000 Units Subcutaneous Q12H    insulin aspart U-100  5 Units Subcutaneous TIDWM    [START ON 5/13/2018] insulin detemir U-100  16 Units Subcutaneous Daily    levalbuterol  0.63 mg Nebulization Q4H    levothyroxine  137 mcg Oral Before breakfast    methylPREDNISolone sodium succinate  80 mg Intravenous Q8H    metronidazole  500 mg Intravenous Q8H    midodrine  10 mg Oral TID    moxifloxacin  400 mg Intravenous Q24H    mycophenolate  250 mg Oral BID    polyethylene glycol  17 g Oral BID    QUEtiapine  50 mg Oral QHS    tacrolimus  5 mg Oral BID     PRN Meds:sodium chloride 0.9%, albuterol-ipratropium 2.5mg-0.5mg/3mL, ALPRAZolam, benzonatate, calcium carbonate, dextrose 50%, dextrose 50%, glucagon (human recombinant), glucose, glucose, heparin (porcine), insulin aspart U-100, midodrine, ondansetron, oxyCODONE    Review of patient's allergies indicates:   Allergen Reactions    No known drug allergies      Objective:     Vital Signs (Most Recent):  Temp: 98 °F (36.7 °C) (05/12/18 1130)  Pulse: (!) 116 (05/12/18 1221)  Resp: 18 (05/12/18 1221)  BP: 109/61 (05/12/18 1130)  SpO2: 100 % (05/12/18 1221) Vital Signs (24h Range):  Temp:  [98 °F (36.7 °C)-99.3 °F (37.4 °C)] 98 °F (36.7 °C)  Pulse:  [113-129] 116  Resp:  [16-23] 18  SpO2:  [95 %-100 %] 100 %  BP: ()/(58-67) 109/61     Patient Vitals for the past 72 hrs (Last 3  readings):   Weight   05/11/18 0400 79.1 kg (174 lb 6.1 oz)   05/10/18 1400 79 kg (174 lb 2.6 oz)   05/10/18 0400 79 kg (174 lb 2.6 oz)     Body mass index is 27.31 kg/m².    Intake/Output Summary (Last 24 hours) at 05/12/18 1447  Last data filed at 05/12/18 0501   Gross per 24 hour   Intake              440 ml   Output                0 ml   Net              440 ml     Physical Exam   Constitutional: He is oriented to person, place, and time. He appears well-developed and well-nourished.   Neck: Normal range of motion. Neck supple. No JVD present.   Cardiovascular: Normal rate and regular rhythm.  Exam reveals no gallop and no friction rub.    No murmur heard.  Pulmonary/Chest: Effort normal and breath sounds normal. He has no wheezes. He has no rales.   Trach in place   Abdominal: Soft. Bowel sounds are normal. He exhibits distension. There is no tenderness.   Musculoskeletal: He exhibits no edema.   Neurological: He is alert and oriented to person, place, and time.   Skin: Skin is warm and dry.   RLE- 1st and 3rd gangrenous toes     Significant Labs:  CBC:    Recent Labs  Lab 05/10/18  0500 05/11/18  0507 05/12/18  0500   WBC 6.88 9.34 9.49   RBC 2.90* 2.97* 2.82*   HGB 8.5* 8.7* 8.4*   HCT 28.9* 29.6* 28.6*   * 377* 281   * 100* 101*   MCH 29.3 29.3 29.8   MCHC 29.4* 29.4* 29.4*     CMP:    Recent Labs  Lab 05/10/18  0500 05/11/18  0507 05/12/18  0500   * 142* 274*   CALCIUM 8.6* 9.8 9.2   ALBUMIN 2.1* 2.4* 2.2*   PROT 5.9* 6.7 6.2    133* 135*   K 3.7 4.8 4.4   CO2 25 23 18*    96 99   BUN 22* 33* 17   CREATININE 2.1* 2.9* 2.2*   ALKPHOS 151* 166* 162*   ALT 10 11 10   AST 20 20 17   BILITOT 0.4 0.4 0.5      Microbiology:  Microbiology Results (last 7 days)     Procedure Component Value Units Date/Time    Aerobic culture [[id number]] Collected:  05/10/18 1411    Order Status:  Completed Specimen:  Ascites from Ascites Updated:  05/11/18 0805     Aerobic Bacterial Culture No growth     Culture, Anaerobe [[id number]] Collected:  05/10/18 1411    Order Status:  Completed Specimen:  Ascites from Ascites Updated:  05/11/18 0739     Anaerobic Culture Culture in progress    Blood culture [[id number]] Collected:  05/05/18 0644    Order Status:  Completed Specimen:  Blood from Midline, Basilic, Right Updated:  05/10/18 1012     Blood Culture, Routine No growth after 5 days.    Blood culture [[id number]] Collected:  05/05/18 0812    Order Status:  Completed Specimen:  Blood Updated:  05/10/18 1012     Blood Culture, Routine No growth after 5 days.    Narrative:       Collection has been rescheduled by CDP at 5/5/2018 07:40 Reason: Due   now  Collection has been rescheduled by CDP at 5/5/2018 07:40 Reason: Due   now    AFB Culture & Smear [[id number]] Collected:  03/28/18 1607    Order Status:  Completed Specimen:  Body Fluid from Lung, RLL Updated:  05/10/18 0927     AFB Culture & Smear Culture in progress     AFB Culture & Smear Culture in progress     AFB CULTURE STAIN No acid fast bacilli seen.    Respiratory Viral Panel by PCR [facility]; Nasal Swab [[id number]] Collected:  05/05/18 0644    Order Status:  Completed Specimen:  Respiratory Updated:  05/08/18 1138     Respiratory Virus Panel, source Nasal Swab     RVP - Adenovirus Not Detected     Comment: Detects Serotypes B and E. Detection of Serotype C may   be limited. If Adenovirus infection is suspected and a   Not Detected result is returned the sample should be   re-tested for Adenovirus using an independent method  (e.g. [employer] Adenovirus Quantitative Real-Time  PCR test.          Enterovirus Not Detected     Comment: Cross-reactivity has been observed between certain Rhinovirus  strains and the Enterovirus assay.          Human Bocavirus Not Detected     Human Coronavirus Not Detected     Comment: The Human Coronavirus assay detects Human coronavirus types  229E, OC43,NL63 and HKU1.          RVP - Human Metapneumovirus (hMPV) Not Detected  "    RVP - Influenza A Not Detected     Influenza A - G2W0-59 Not Detected     RVP - Influenza B Not Detected     Parainfluenza Not Detected     Respiratory Syncytial VirusVirus (RSV) A Not Detected     Comment: The Respiratory Syncytial Viral assay detects types A and B,  however it does not distinguish between the two.          RVP - Rhinovirus Not Detected     Comment: Cross-Reactivity has been observed between certain   Rhinovirus strains and the Enterovirus assay.  Target Enriched Mulitplex Polymerase Chain Reaction (TEM-PCR)  allows for the detection of multiple pathogens out of a single  reaction.  This test was developed and its performance   characteristics determined by [employer].  It has not   been cleared or approved by the U.S.Food and Drug Administration.  Results should be used in conjunction with clinical findings,   and should not form the sole basis for a diagnosis or treatment  decision.  TEM-PCR is a licensed technology of [employer].         Narrative:       Receiving Lab:->[facility]    Culture, Respiratory with Gram Stain [[id number]]     Order Status:  Canceled Specimen:  Respiratory         I have reviewed all pertinent labs within the past 24 hours.    Estimated Creatinine Clearance: 40.1 mL/min (A) (based on SCr of 2.2 mg/dL (H)).    Diagnostic Results:  CXR (5/11/18): "Worsening airspace consolidation throughout the left lung since the most recent prior examination of 05/05/2018 is observed.  No other detrimental interval change in the appearance of the chest since that time is appreciated."  "

## 2018-05-13 NOTE — ASSESSMENT & PLAN NOTE
- Repeat paracentesis requested for Tuesday 5/15 (Spoke with IR)   - Albumin if >5Ls removed   - Only 300mL removed with previous paracentesis (5/10/18)

## 2018-05-13 NOTE — TREATMENT PLAN
bg elevated over the past day. Increase detemir to 20 units daily and increase novolog to 8 units with meals.    Ankur Keith MD

## 2018-05-13 NOTE — PLAN OF CARE
Problem: Physical Therapy Goal  Goal: Physical Therapy Goal  Goals to be met by: 18     Patient will increase functional independence with mobility by performin. Supine to sit with Moderate Assistance.  2. Sit to supine with Moderate Assistance.  3. Rolling to Left and Right with Minimal Assistance.   4. Sit to stand transfer with Moderate Assistance.  5. Bed to chair transfer with Maximum Assistance.  6. Gait  x 5 feet with Minimal Assistance.   7.  Pt to perf and be compliant with LE HEP to improve vascularity and mm strength.           Outcome: Ongoing (interventions implemented as appropriate)  Patient's tolerance to activities on an upright position and B LE PROM have improved.

## 2018-05-13 NOTE — PROGRESS NOTES
[facility]  Heart Transplant  Progress Note    Patient Name: Lv Crocker  MRN: [id number]  Admission Date: 3/11/2018  Hospital Length of Stay: 62 days  Attending Physician: Jose A Lloyd MD  Primary Care Provider: Philippe Mohr MD  Principal Problem:Acute respiratory failure with hypoxia    Subjective:     Interval History: Felt much better ON. Still requiring HFNC but has been doing well with weaning. No fevers or Leukocytosis. Patient has felt better after doing incentive spirometry.     Continuous Infusions:    Scheduled Meds:   acetaminophen  650 mg Oral TID    cetirizine  5 mg Oral Daily    docusate sodium  100 mg Oral BID    epoetin jose miguel (PROCRIT) injection  100 Units/kg (Dosing Weight) Intravenous Every Mon, Wed, Fri    escitalopram oxalate  20 mg Oral Daily    famotidine  20 mg Oral QHS    guaiFENesin  1,200 mg Oral BID    heparin (porcine)  5,000 Units Subcutaneous Q12H    insulin aspart U-100  8 Units Subcutaneous TIDWM    [START ON 5/14/2018] insulin detemir U-100  20 Units Subcutaneous Daily    levalbuterol  0.63 mg Nebulization Q4H    levothyroxine  137 mcg Oral Before breakfast    midodrine  10 mg Oral TID    moxifloxacin  400 mg Oral Daily    mycophenolate  250 mg Oral BID    polyethylene glycol  17 g Oral BID    QUEtiapine  50 mg Oral QHS    tacrolimus  5 mg Oral BID     PRN Meds:sodium chloride 0.9%, albuterol-ipratropium 2.5mg-0.5mg/3mL, ALPRAZolam, benzonatate, calcium carbonate, dextrose 50%, dextrose 50%, glucagon (human recombinant), glucose, glucose, heparin (porcine), insulin aspart U-100, midodrine, ondansetron, oxyCODONE    Review of patient's allergies indicates:   Allergen Reactions    No known drug allergies      Objective:     Vital Signs (Most Recent):  Temp: 97.7 °F (36.5 °C) (05/13/18 1136)  Pulse: 103 (05/13/18 1136)  Resp: 18 (05/13/18 1136)  BP: 108/69 (05/13/18 1136)  SpO2: 100 % (05/13/18 1136) Vital Signs (24h Range):  Temp:   [97.5 °F (36.4 °C)-97.9 °F (36.6 °C)] 97.7 °F (36.5 °C)  Pulse:  [] 103  Resp:  [16-20] 18  SpO2:  [99 %-100 %] 100 %  BP: (108-128)/(61-75) 108/69     Patient Vitals for the past 72 hrs (Last 3 readings):   Weight   05/11/18 0400 79.1 kg (174 lb 6.1 oz)   05/10/18 1400 79 kg (174 lb 2.6 oz)     Body mass index is 27.31 kg/m².    Intake/Output Summary (Last 24 hours) at 05/13/18 1350  Last data filed at 05/13/18 0546   Gross per 24 hour   Intake             1200 ml   Output                0 ml   Net             1200 ml     Physical Exam   Constitutional: He is oriented to person, place, and time. He appears well-developed and well-nourished.   Neck: Normal range of motion. Neck supple. No JVD present.   Cardiovascular: Normal rate and regular rhythm.  Exam reveals no gallop and no friction rub.    No murmur heard.  Pulmonary/Chest: Effort normal and breath sounds normal. He has no wheezes. He has no rales.    Ostomy is (C/D/I)   Abdominal: Soft. Bowel sounds are normal. He exhibits distension. There is no tenderness.   Musculoskeletal: He exhibits no edema.   Neurological: He is alert and oriented to person, place, and time.   Skin: Skin is warm and dry.   RLE- 1st and 3rd gangrenous toes     Significant Labs:  CBC:    Recent Labs  Lab 05/11/18  0507 05/12/18  0500 05/13/18  0600   WBC 9.34 9.49 6.46   RBC 2.97* 2.82* 2.84*   HGB 8.7* 8.4* 8.4*   HCT 29.6* 28.6* 27.8*   * 281 308   * 101* 98   MCH 29.3 29.8 29.6   MCHC 29.4* 29.4* 30.2*     CMP:    Recent Labs  Lab 05/11/18  0507 05/12/18  0500 05/13/18  0600   * 274* 307*   CALCIUM 9.8 9.2 9.1   ALBUMIN 2.4* 2.2* 2.2*   PROT 6.7 6.2 6.1   * 135* 133*   K 4.8 4.4 4.6   CO2 23 18* 22*   CL 96 99 99   BUN 33* 17 31*   CREATININE 2.9* 2.2* 3.0*   ALKPHOS 166* 162* 142*   ALT 11 10 9*   AST 20 17 10   BILITOT 0.4 0.5 0.4      Microbiology:  Microbiology Results (last 7 days)     Procedure Component Value Units Date/Time    Aerobic  culture [[id number]] Collected:  05/10/18 1411    Order Status:  Completed Specimen:  Ascites from Ascites Updated:  05/11/18 0805     Aerobic Bacterial Culture No growth    Culture, Anaerobe [[id number]] Collected:  05/10/18 1411    Order Status:  Completed Specimen:  Ascites from Ascites Updated:  05/11/18 0739     Anaerobic Culture Culture in progress    Blood culture [[id number]] Collected:  05/05/18 0644    Order Status:  Completed Specimen:  Blood from Midline, Basilic, Right Updated:  05/10/18 1012     Blood Culture, Routine No growth after 5 days.    Blood culture [[id number]] Collected:  05/05/18 0812    Order Status:  Completed Specimen:  Blood Updated:  05/10/18 1012     Blood Culture, Routine No growth after 5 days.    Narrative:       Collection has been rescheduled by CDP at 5/5/2018 07:40 Reason: Due   now  Collection has been rescheduled by CDP at 5/5/2018 07:40 Reason: Due   now    AFB Culture & Smear [[id number]] Collected:  03/28/18 1607    Order Status:  Completed Specimen:  Body Fluid from Lung, RLL Updated:  05/10/18 0927     AFB Culture & Smear Culture in progress     AFB Culture & Smear Culture in progress     AFB CULTURE STAIN No acid fast bacilli seen.    Respiratory Viral Panel by PCR [facility]; Nasal Swab [[id number]] Collected:  05/05/18 0644    Order Status:  Completed Specimen:  Respiratory Updated:  05/08/18 1138     Respiratory Virus Panel, source Nasal Swab     RVP - Adenovirus Not Detected     Comment: Detects Serotypes B and E. Detection of Serotype C may   be limited. If Adenovirus infection is suspected and a   Not Detected result is returned the sample should be   re-tested for Adenovirus using an independent method  (e.g. [employer] Adenovirus Quantitative Real-Time  PCR test.          Enterovirus Not Detected     Comment: Cross-reactivity has been observed between certain Rhinovirus  strains and the Enterovirus assay.          Human Bocavirus Not Detected     Human Coronavirus  "Not Detected     Comment: The Human Coronavirus assay detects Human coronavirus types  229E, OC43,NL63 and HKU1.          RVP - Human Metapneumovirus (hMPV) Not Detected     RVP - Influenza A Not Detected     Influenza A - [id number] Not Detected     RVP - Influenza B Not Detected     Parainfluenza Not Detected     Respiratory Syncytial VirusVirus (RSV) A Not Detected     Comment: The Respiratory Syncytial Viral assay detects types A and B,  however it does not distinguish between the two.          RVP - Rhinovirus Not Detected     Comment: Cross-Reactivity has been observed between certain   Rhinovirus strains and the Enterovirus assay.  Target Enriched Mulitplex Polymerase Chain Reaction (TEM-PCR)  allows for the detection of multiple pathogens out of a single  reaction.  This test was developed and its performance   characteristics determined by [employer].  It has not   been cleared or approved by the U.S.Food and Drug Administration.  Results should be used in conjunction with clinical findings,   and should not form the sole basis for a diagnosis or treatment  decision.  TEM-PCR is a licensed technology of [employer].         Narrative:       Receiving Lab:->[facility]        I have reviewed all pertinent labs within the past 24 hours.    Estimated Creatinine Clearance: 29.4 mL/min (A) (based on SCr of 3 mg/dL (H)).    Diagnostic Results:  CXR (5/11/18): "Worsening airspace consolidation throughout the left lung since the most recent prior examination of 05/05/2018 is observed.  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 Most recently has had ongoing issues with his lungs, had gotten 2 " thoracenteses, after which he underwent VATS 01/19/18 followed by pleurex catheter placement and effusion drainage 01/11/18, subsequently had it removed 02/07/18 after drainage had decreased despite multiple attempts to drain it. Has been having significant coughing fits that result in him being near-syncopal at times, although difficult to say if he has passed out or not- patient most recently was admitted to the hospital for increased AGUILAR, coughing and pre-syncope 02/11-02/14/18 at [facility].   Patient was started on antibiotics for suspected bacterial infection, with some diarrhea, bronchitis, and possible pneumonia. Ct chest showed some pleural fluid collection and after talking with Dr. James, we arranged for him to receive a diagnostic tap with IR, from which the fluid collection demonstrated no growth or significant findings at all really. Cell counts do not appear to have been sent but will recheck. Patient states since his hospital stay, yesterday had a significant coughing fit again, after which he nearly passed out, is being seen in clinic today for this. Denies any cardiopulmonary complaints, no leg swelling, has some abdominal swelling, which is moderate for him. Denies significant shortness of breath with mild exertion, had 6MWT yesterday to see if he qualified for home O2, and his O2 sats actually improved after recovery from where he started initially. He and his wife admit he is in bed most days, not very active.     Mr. Crocker presented to the ED 3/11/18 with approximately 3 weeks of worsening diarrhea and 2-3 days of sinus pressure, drainage, and worsened cough.  He notes that he had a coughing fit last night and passed out, coughed throughout most of the night.  This also happened on 2/25/18.  He last saw Dr Ferreira in clinic on 2/20/18 where he was taken off myfortic and switched to cellcept (he hadn't been on cellcept because of leukopenia when they tried post-transplant).  Though his diarrhea  had improved after his last discharge, he states it has increased now to 15x/day, clear/yellow/green, no fevers, no exacerbating foods per say.  He was taken back off the cellcept and placed back on myfortic this past week and has not noticed immediate change in diarrhea. He also reports his baseline coughing fits havent improved and are minimally responsive to tessalon pearls.  Came on last night, he lost consciousness during a fit once which is relatively normal for him, and had two more during HPI.  He notes no sick contacts but does state he's had some URI symptoms as above.  His baseline vitals are usually -120 and BP 90s/60s-110s/70s.  He reports a history of intermittent diarrhea - did have Cdiff many years ago but this smells different.  No abdominal pain, no nausea, no vomiting.  No blood in diarrhea.  Appears last c-scope in 2015 with no evidence of infection or inflammatory processes.  He does report IBS which is different that this.       * Acute respiratory failure with hypoxia    - Resolved. Now with trach decannulation  - Etiology = RSV   - Stoma care / education provided by pulmonology   - Continue pulmonary toilet to help with respiratory mechanics   - Appreciate PT/OT/Wound care.  - Plan is to finish 7 day course of moxifloxacin   - D/C pulse steroids  - Continue to wean HFNC and pulmonary toilet  - Repeat CXR tomorrow to see if this is organizing PNA (I have low suspicion for aspiration)   - Called and asked dialysis to take more than 1L off with sessions         Heart transplanted    - TTE 4/24/18 showed normal graft function but has known history of restrictive CM post transplant.  - Continue Prednisone 5mg daily and Cellcept 250mg BID   - Continue Tacrolimus 5/5 with plan to adjust as needed for goal 6-10  - Tacro level 10.6        Abdominal distention    - Repeat paracentesis requested for Tuesday 5/15 (Spoke with IR)   - Albumin if >5Ls removed   - Only 300mL removed with previous  paracentesis (5/10/18)        Type 1 diabetes mellitus with stage 3 chronic kidney disease    - Appreciate Endocrine's recs  - Transition to basal bolus with uptitration as needed   - BS have been high due to steroid pulse         Hypothyroidism due to Hashimoto's thyroiditis    - Appreciate Endocrine's recs  - Switched to PO Levothyroxine 137mcg daily        Alteration in skin integrity    - Dry gangrene on toes. No surgical intervention indicated.   - Wound care following. Local care only at this point.         ESRD (end stage renal disease)    - Appreciate Nephrology recs.   - Continue midodrine  - HD per nephrology [facility].         Restrictive cardiomyopathy    - Requested more fluid removal with dialysis         Anemia    - Nephrology is managing ProCrit   - Hgb stable         Debility    - PT/OT/SLP daily. Recommending rehab.  - Nutrition following. Regular diet with thin liquids  - Passed MBSS with speech.          Anxiety    - Increased Lexapro to 20mg daily  - Continue seroquel 50 QHS   - Continue .5mg xanax PRN Q8H   - Appreciate psychiatry input.             Mamadou rhodes, DO  Heart Transplant  [facility]

## 2018-05-13 NOTE — SUBJECTIVE & OBJECTIVE
Interval History: Felt much better ON. Still requiring HFNC but has been doing well with weaning. No fevers or Leukocytosis. Patient has felt better after doing incentive spirometry.     Continuous Infusions:    Scheduled Meds:   acetaminophen  650 mg Oral TID    cetirizine  5 mg Oral Daily    docusate sodium  100 mg Oral BID    epoetin jose miguel (PROCRIT) injection  100 Units/kg (Dosing Weight) Intravenous Every Mon, Wed, Fri    escitalopram oxalate  20 mg Oral Daily    famotidine  20 mg Oral QHS    guaiFENesin  1,200 mg Oral BID    heparin (porcine)  5,000 Units Subcutaneous Q12H    insulin aspart U-100  8 Units Subcutaneous TIDWM    [START ON 5/14/2018] insulin detemir U-100  20 Units Subcutaneous Daily    levalbuterol  0.63 mg Nebulization Q4H    levothyroxine  137 mcg Oral Before breakfast    midodrine  10 mg Oral TID    moxifloxacin  400 mg Oral Daily    mycophenolate  250 mg Oral BID    polyethylene glycol  17 g Oral BID    QUEtiapine  50 mg Oral QHS    tacrolimus  5 mg Oral BID     PRN Meds:sodium chloride 0.9%, albuterol-ipratropium 2.5mg-0.5mg/3mL, ALPRAZolam, benzonatate, calcium carbonate, dextrose 50%, dextrose 50%, glucagon (human recombinant), glucose, glucose, heparin (porcine), insulin aspart U-100, midodrine, ondansetron, oxyCODONE    Review of patient's allergies indicates:   Allergen Reactions    No known drug allergies      Objective:     Vital Signs (Most Recent):  Temp: 97.7 °F (36.5 °C) (05/13/18 1136)  Pulse: 103 (05/13/18 1136)  Resp: 18 (05/13/18 1136)  BP: 108/69 (05/13/18 1136)  SpO2: 100 % (05/13/18 1136) Vital Signs (24h Range):  Temp:  [97.5 °F (36.4 °C)-97.9 °F (36.6 °C)] 97.7 °F (36.5 °C)  Pulse:  [] 103  Resp:  [16-20] 18  SpO2:  [99 %-100 %] 100 %  BP: (108-128)/(61-75) 108/69     Patient Vitals for the past 72 hrs (Last 3 readings):   Weight   05/11/18 0400 79.1 kg (174 lb 6.1 oz)   05/10/18 1400 79 kg (174 lb 2.6 oz)     Body mass index is 27.31  kg/m².    Intake/Output Summary (Last 24 hours) at 05/13/18 1350  Last data filed at 05/13/18 0546   Gross per 24 hour   Intake             1200 ml   Output                0 ml   Net             1200 ml     Physical Exam   Constitutional: He is oriented to person, place, and time. He appears well-developed and well-nourished.   Neck: Normal range of motion. Neck supple. No JVD present.   Cardiovascular: Normal rate and regular rhythm.  Exam reveals no gallop and no friction rub.    No murmur heard.  Pulmonary/Chest: Effort normal and breath sounds normal. He has no wheezes. He has no rales.    Ostomy is (C/D/I)   Abdominal: Soft. Bowel sounds are normal. He exhibits distension. There is no tenderness.   Musculoskeletal: He exhibits no edema.   Neurological: He is alert and oriented to person, place, and time.   Skin: Skin is warm and dry.   RLE- 1st and 3rd gangrenous toes     Significant Labs:  CBC:    Recent Labs  Lab 05/11/18  0507 05/12/18  0500 05/13/18  0600   WBC 9.34 9.49 6.46   RBC 2.97* 2.82* 2.84*   HGB 8.7* 8.4* 8.4*   HCT 29.6* 28.6* 27.8*   * 281 308   * 101* 98   MCH 29.3 29.8 29.6   MCHC 29.4* 29.4* 30.2*     CMP:    Recent Labs  Lab 05/11/18  0507 05/12/18  0500 05/13/18  0600   * 274* 307*   CALCIUM 9.8 9.2 9.1   ALBUMIN 2.4* 2.2* 2.2*   PROT 6.7 6.2 6.1   * 135* 133*   K 4.8 4.4 4.6   CO2 23 18* 22*   CL 96 99 99   BUN 33* 17 31*   CREATININE 2.9* 2.2* 3.0*   ALKPHOS 166* 162* 142*   ALT 11 10 9*   AST 20 17 10   BILITOT 0.4 0.5 0.4      Microbiology:  Microbiology Results (last 7 days)     Procedure Component Value Units Date/Time    Aerobic culture [[id number]] Collected:  05/10/18 1411    Order Status:  Completed Specimen:  Ascites from Ascites Updated:  05/11/18 0805     Aerobic Bacterial Culture No growth    Culture, Anaerobe [[id number]] Collected:  05/10/18 1411    Order Status:  Completed Specimen:  Ascites from Ascites Updated:  05/11/18 0739     Anaerobic Culture  Culture in progress    Blood culture [[id number]] Collected:  05/05/18 0644    Order Status:  Completed Specimen:  Blood from Midline, Basilic, Right Updated:  05/10/18 1012     Blood Culture, Routine No growth after 5 days.    Blood culture [[id number]] Collected:  05/05/18 0812    Order Status:  Completed Specimen:  Blood Updated:  05/10/18 1012     Blood Culture, Routine No growth after 5 days.    Narrative:       Collection has been rescheduled by CDP at 5/5/2018 07:40 Reason: Due   now  Collection has been rescheduled by CDP at 5/5/2018 07:40 Reason: Due   now    AFB Culture & Smear [[id number]] Collected:  03/28/18 1607    Order Status:  Completed Specimen:  Body Fluid from Lung, RLL Updated:  05/10/18 0927     AFB Culture & Smear Culture in progress     AFB Culture & Smear Culture in progress     AFB CULTURE STAIN No acid fast bacilli seen.    Respiratory Viral Panel by PCR [facility]; Nasal Swab [[id number]] Collected:  05/05/18 0644    Order Status:  Completed Specimen:  Respiratory Updated:  05/08/18 1138     Respiratory Virus Panel, source Nasal Swab     RVP - Adenovirus Not Detected     Comment: Detects Serotypes B and E. Detection of Serotype C may   be limited. If Adenovirus infection is suspected and a   Not Detected result is returned the sample should be   re-tested for Adenovirus using an independent method  (e.g. [employer] Adenovirus Quantitative Real-Time  PCR test.          Enterovirus Not Detected     Comment: Cross-reactivity has been observed between certain Rhinovirus  strains and the Enterovirus assay.          Human Bocavirus Not Detected     Human Coronavirus Not Detected     Comment: The Human Coronavirus assay detects Human coronavirus types  229E, OC43,NL63 and HKU1.          RVP - Human Metapneumovirus (hMPV) Not Detected     RVP - Influenza A Not Detected     Influenza A - V9B8-28 Not Detected     RVP - Influenza B Not Detected     Parainfluenza Not Detected     Respiratory Syncytial  "VirusVirus (RSV) A Not Detected     Comment: The Respiratory Syncytial Viral assay detects types A and B,  however it does not distinguish between the two.          RVP - Rhinovirus Not Detected     Comment: Cross-Reactivity has been observed between certain   Rhinovirus strains and the Enterovirus assay.  Target Enriched Mulitplex Polymerase Chain Reaction (TEM-PCR)  allows for the detection of multiple pathogens out of a single  reaction.  This test was developed and its performance   characteristics determined by [employer].  It has not   been cleared or approved by the U.S.Food and Drug Administration.  Results should be used in conjunction with clinical findings,   and should not form the sole basis for a diagnosis or treatment  decision.  TEM-PCR is a licensed technology of [employer].         Narrative:       Receiving Lab:->[facility]        I have reviewed all pertinent labs within the past 24 hours.    Estimated Creatinine Clearance: 29.4 mL/min (A) (based on SCr of 3 mg/dL (H)).    Diagnostic Results:  CXR (5/11/18): "Worsening airspace consolidation throughout the left lung since the most recent prior examination of 05/05/2018 is observed.  No other detrimental interval change in the appearance of the chest since that time is appreciated."  "

## 2018-05-13 NOTE — PLAN OF CARE
Problem: Patient Care Overview  Goal: Plan of Care Review  Outcome: Ongoing (interventions implemented as appropriate)  POC reviewed w/ pt and spouse this am to include increasing activity, wound care, blood glucose management, promoting healthy respiratory functioning, and IV antibiotics.  Pt doing better today compare to yesterday.  Pt weaned from 4L to 1.5L HF NC this afternoon - sating mid to high 90's.  CXR done this am.  Pt strongly encouraged to do IS multiple times per hour - needs reinforcement.  Pt repositioned in bed frequently, sat up in chair for about 2 hours today.  Pt's trach dressing changed today.  Pt's blood glucose elevated today, appetite improved from yesterday, insulin regimen adjusted per endocrine.  Pt's IV antibiotics d/c'd, switch to PO moxifloxacin.  Para scheduled per primary team for 5/15/18.

## 2018-05-13 NOTE — PT/OT/SLP PROGRESS
Occupational Therapy   Treatment    Name: Lv Crocker  MRN: [id number]  Admitting Diagnosis:  Acute respiratory failure with hypoxia  39 Days Post-Op    Recommendations:     Discharge Recommendations: rehabilitation facility  Discharge Equipment Recommendations:   (TBD)  Barriers to discharge:  Inaccessible home environment, Decreased caregiver support    Subjective     Communicated with: RN prior to session.  Pain/Comfort:  · Pain Rating 1: 5/10  · Location - Side 1: Right  · Location - Orientation 1: generalized  · Location 1: knee  · Pain Addressed 1: Reposition, Distraction, Cessation of Activity  · Pain Rating Post-Intervention 1: 0/10    Patients cultural, spiritual, Judaism conflicts given the current situation: none stated    Objective:     Patient found with: SCD, telemetry, oxygen    General Precautions: Standard, aspiration, fall   Orthopedic Precautions:RLE weight bearing as tolerated, LLE weight bearing as tolerated   Braces:  (Darco shoes)     Occupational Performance:    Bed Mobility:    · Patient completed Rolling/Turning to Left with  maximal assistance  · Patient completed Scooting/Bridging with maximal assistance  · Patient completed Supine to Sit with maximal assistance   · Pt seated EOB for ~20 min with SBA-min A for balance    Functional Mobility/Transfers:  · Patient completed Sit <> Stand Transfer x 3 (25 seconds each) with maximal assistance and of 2 persons with no assistive device   · Patient completed Bed <> Medichair Transfer using Squat Pivot technique with total assistance with no assistive device  · Functional Mobility: NT    Activities of Daily Living:  · UB Dressing: maximal assistance to don backward gown while seated EOB  · LB Dressing: total assistance to don B socks in supine and B Darco shoes while seated EOB    Patient left up in Medichair with all lines intact, call button in reach and wife present    AMPA 6 Click:  AMPA Total Score: 12    Treatment &  Education:  *RUE AAROM exercises 10 x 1 for shoulder flex to 90, elbow flex to 60/ext, wrist flex/ext, should abd to ~80, and chest press with arm supported at 90 while seated up in Medichair  *LUE AAROM exercises 10 x 1 for shoulder flex to 45, elbow flex to 45/ext, wrist flex/ext, should abd to 45, and chest press with arm supported at 45 while seated up in Medichair  *Pt educated on role of OT/POC  *White board/communication board updated  Education:    Assessment:     Lv Crocker is a 42 y.o. male with a medical diagnosis of Acute respiratory failure with hypoxia.  He presents with generalized weakness and edema limiting use of extremities for functional mobility and self care.  Performance deficits affecting function are weakness, impaired endurance, gait instability, impaired functional mobilty, impaired self care skills, impaired balance, decreased lower extremity function, decreased upper extremity function, pain, decreased ROM, edema, decreased coordination.      Rehab Prognosis:  Good; patient would benefit from acute skilled OT services to address these deficits and reach maximum level of function.       Plan:     Patient to be seen 5 x/week to address the above listed problems via self-care/home management, therapeutic activities, therapeutic exercises  · Plan of Care Expires: 05/31/18  · Plan of Care Reviewed with: patient, spouse    This Plan of care has been discussed with the patient who was involved in its development and understands and is in agreement with the identified goals and treatment plan    GOALS:    Occupational Therapy Goals        Problem: Occupational Therapy Goal    Goal Priority Disciplines Outcome Interventions   Occupational Therapy Goal     OT, PT/OT Ongoing (interventions implemented as appropriate)    Description:  Goals to be met by: 5/8/18 goal extended to be met by: 5/15/18    Pt will follow 75% of motor commands with <2 verbal cues for repetition of task to  maximize engagement in grooming task.--MET  Pt will complete feeding with mod A.   Pt will complete with HOB slightly elevated to seated at EOB with mod A in prep for seated grooming task.  Pt will engage in BUE exercises in orders to increase strength to 3+/5 in BUE's to increase engagement in seated grooming task  Pt will sit at EOB for approx 10 minutes with mod A and unilateral UE support to complete grooming task  Pt will complete sit>stand from EOB with bed in lowest position with mod A in prep for transfer to [facility]                        Time Tracking:     OT Date of Treatment: 05/13/18  OT Start Time: 1000  OT Stop Time: 1031  OT Total Time (min): 31 min    Billable Minutes:Therapeutic Exercise 10  Neuromuscular Re-education 21    Petra Kamara, OT  5/13/2018

## 2018-05-13 NOTE — ASSESSMENT & PLAN NOTE
- Resolved. Now with trach decannulation  - Etiology = RSV   - Stoma care / education provided by pulmonology   - Continue pulmonary toilet to help with respiratory mechanics   - Appreciate PT/OT/Wound care.  - Plan is to finish 7 day course of moxifloxacin   - D/C pulse steroids  - Continue to wean HFNC and pulmonary toilet  - Repeat CXR tomorrow to see if this is organizing PNA (I have low suspicion for aspiration)   - Called and asked dialysis to take more than 1L off with sessions

## 2018-05-13 NOTE — PLAN OF CARE
Problem: Occupational Therapy Goal  Goal: Occupational Therapy Goal  Goals to be met by: 5/8/18 goal extended to be met by: 5/15/18    Pt will follow 75% of motor commands with <2 verbal cues for repetition of task to maximize engagement in grooming task.--MET  Pt will complete feeding with mod A.   Pt will complete with HOB slightly elevated to seated at EOB with mod A in prep for seated grooming task.  Pt will engage in BUE exercises in orders to increase strength to 3+/5 in BUE's to increase engagement in seated grooming task  Pt will sit at EOB for approx 10 minutes with mod A and unilateral UE support to complete grooming task  Pt will complete sit>stand from EOB with bed in lowest position with mod A in prep for transfer to [facility]       Outcome: Ongoing (interventions implemented as appropriate)  Goals remain appropriate    Comments: Continue OT SHENG Kamara OT  5/13/2018

## 2018-05-13 NOTE — ASSESSMENT & PLAN NOTE
- TTE 4/24/18 showed normal graft function but has known history of restrictive CM post transplant.  - Continue Prednisone 5mg daily and Cellcept 250mg BID   - Continue Tacrolimus 5/5 with plan to adjust as needed for goal 6-10  - Tacro level 10.6

## 2018-05-13 NOTE — ASSESSMENT & PLAN NOTE
- Appreciate Endocrine's recs  - Transition to basal bolus with uptitration as needed   - BS have been high due to steroid pulse

## 2018-05-13 NOTE — PT/OT/SLP PROGRESS
Physical Therapy Treatment    Patient Name:  Lv Crocker   MRN:  [id number]    Recommendations:     Discharge Recommendations:  rehabilitation facility   Discharge Equipment Recommendations:  (TBD at next level of care)   Barriers to discharge: Inaccessible home and Decreased caregiver support    Assessment:     Lv Crocker is a 42 y.o. male admitted with a medical diagnosis of Acute respiratory failure with hypoxia.  He presents with the following impairments/functional limitations:  weakness, impaired endurance, impaired self care skills, impaired functional mobilty, impaired balance, decreased upper extremity function, decreased lower extremity function, decreased ROM, pain, edema, impaired cardiopulmonary response to activity, decreased coordination, impaired joint extensibility . Patient showed discomfort with R knee flexion, but overall PROM has improved on all planes of motion, due to decreased B LE swelling.    Rehab Prognosis:  fair; patient would benefit from acute skilled PT services to address these deficits and reach maximum level of function.      Recent Surgery: Procedure(s) (LRB):  INSERTION-CATHETER-PERM-A-CATH (Left)  INSERTION-TUBE-GASTROSTOMY (N/A) 39 Days Post-Op    Plan:     During this hospitalization, patient to be seen 5 x/week to address the above listed problems via gait training, therapeutic activities, therapeutic exercises, neuromuscular re-education  · Plan of Care Expires:  06/01/18   Plan of Care Reviewed with: patient, spouse    Subjective     Communicated with RN prior to session.  Patient found in supine upon PT entry to room, agreeable to treatment.      Chief Complaint: fatigue  Patient comments/goals: to get stronger  Pain/Comfort:  · Pain Rating 1: 5/10  · Location - Side 1: Right  · Location - Orientation 1: medial  · Location 1:  (With knee flexion only)  · Pain Addressed 1: Reposition, Cessation of Activity  · Pain Rating Post-Intervention 1:  0/10    Patients cultural, spiritual, Adventism conflicts given the current situation: None stated    Objective:     Patient found with: SCD, telemetry     General Precautions: Standard, aspiration, fall   Orthopedic Precautions:RLE weight bearing as tolerated, LLE weight bearing as tolerated   Braces:  (Darco Shoes)     Functional Mobility:  · Bed Mobility:     · Rolling Left:  maximal assistance  · Scooting: total assistance  · Supine to Sit: maximal assistance and of 2 persons  · Transfers:     · Sit to Stand:  total assistance with no AD  · Bed to Chair: total assistance and of 2 persons with  no AD  using  Squat Pivot  · Balance: fair in sitting      AM-PAC 6 CLICK MOBILITY  Turning over in bed (including adjusting bedclothes, sheets and blankets)?: 2  Sitting down on and standing up from a chair with arms (e.g., wheelchair, bedside commode, etc.): 2  Moving from lying on back to sitting on the side of the bed?: 2  Moving to and from a bed to a chair (including a wheelchair)?: 2  Need to walk in hospital room?: 1  Climbing 3-5 steps with a railing?: 1  Total Score: 10       Therapeutic Activities and Exercises:   B LE PROM x 25 reps on all available planes of motion. Static sitting balance at EOB x 15 minutes with min to CGA. Donned Darco boots and a second gown. Static standing balance activity with OT and PTA blocking B knees 3x25 secs with max assistance. Squat pivot transfer from bed to cardiac chair with total assistance.    Patient left up in cardiac chair with all lines intact, call button in reach and O.T. and Spouse present..    GOALS:    Physical Therapy Goals        Problem: Physical Therapy Goal    Goal Priority Disciplines Outcome Goal Variances Interventions   Physical Therapy Goal     PT/OT, PT Ongoing (interventions implemented as appropriate)     Description:  Goals to be met by: 18     Patient will increase functional independence with mobility by performin. Supine to sit with  Moderate Assistance.  2. Sit to supine with Moderate Assistance.  3. Rolling to Left and Right with Minimal Assistance.   4. Sit to stand transfer with Moderate Assistance.  5. Bed to chair transfer with Maximum Assistance.  6. Gait  x 5 feet with Minimal Assistance.   7.  Pt to perf and be compliant with LE HEP to improve vascularity and mm strength.                            Time Tracking:     PT Received On: 05/13/18  PT Start Time: 0955     PT Stop Time: 1022  PT Total Time (min): 27 min     Billable Minutes: Therapeutic Exercise 10 and Neuromuscular Re-education 17    Treatment Type: Treatment, Other (see comments) (Co-treatment with O.T.)  PT/PTA: PTA     PTA Visit Number: 1     Jamel Hoang, MAURA  05/13/2018

## 2018-05-14 PROBLEM — D72.829 LEUKOCYTOSIS: Status: ACTIVE | Noted: 2018-01-01

## 2018-05-14 NOTE — PT/OT/SLP PROGRESS
Physical Therapy      Patient Name:  Lv Crocker   MRN:  [id number]    Patient not seen today secondary to Dialysis in AM.  PM - pt not appropriate for therapy d/t n/v per RN. Will follow-up next scheduled date.    Corey Hernández, PT   5/14/2018

## 2018-05-14 NOTE — ASSESSMENT & PLAN NOTE
- planned for Paracentesis tomorrow  - Prior samples negative for infection  - will ask for cultures and GS tomorrow

## 2018-05-14 NOTE — PROGRESS NOTES
Dialysis tx completed. 3.5 hours. 3 liters removed. Left CVC locked with heparin, capped and secured. Tolerated tx well. Report called to Joshua CHRISTINA.

## 2018-05-14 NOTE — PROGRESS NOTES
"Discharge planning    Received call from Paolo at [facility] with official decline of pt for out pt dialysis at this time due to "it is not necessary."    SW informed pt's wife Jessica [phone] via voicemail of [facility] decline and that due to facilities in their area don't have dialysis in-pt [facility] will be the best option. Provided ph # for SW should wife have questions.    SW following and remains available.  "

## 2018-05-14 NOTE — SUBJECTIVE & OBJECTIVE
Interval History:     Had nausea this morning before and during HD. Reported some mild emesis after HD and feeling tired after HD. Isolated leukocytosis noted on labs this morning > 16K. Managed to remove Liters UF today without hemodynamic issues. Scheduled for paracentesis tomorrow which may help with nausea. Respiratory PCR on 5/5 positive for persistent RSV.     Continuous Infusions:  Scheduled Meds:   acetaminophen  650 mg Oral TID    cetirizine  5 mg Oral Daily    docusate sodium  100 mg Oral BID    epoetin jose miguel (PROCRIT) injection  100 Units/kg (Dosing Weight) Intravenous Every Mon, Wed, Fri    escitalopram oxalate  20 mg Oral Daily    famotidine  20 mg Oral QHS    guaiFENesin  1,200 mg Oral BID    heparin (porcine)  5,000 Units Subcutaneous Q12H    insulin aspart U-100  8 Units Subcutaneous TIDWM    insulin detemir U-100  20 Units Subcutaneous Daily    levalbuterol  0.63 mg Nebulization Q4H    levothyroxine  137 mcg Oral Before breakfast    midodrine  10 mg Oral TID    moxifloxacin  400 mg Oral Daily    mycophenolate  250 mg Oral BID    polyethylene glycol  17 g Oral BID    predniSONE  5 mg Oral Daily    QUEtiapine  50 mg Oral QHS    tacrolimus  5 mg Oral BID     PRN Meds:sodium chloride 0.9%, sodium chloride 0.9%, albuterol-ipratropium 2.5mg-0.5mg/3mL, ALPRAZolam, benzonatate, calcium carbonate, dextrose 50%, dextrose 50%, glucagon (human recombinant), glucose, glucose, heparin (porcine), insulin aspart U-100, midodrine, ondansetron, oxyCODONE, promethazine (PHENERGAN) IVPB    Review of patient's allergies indicates:   Allergen Reactions    No known drug allergies      Objective:     Vital Signs (Most Recent):  Temp: 97.5 °F (36.4 °C) (05/14/18 1243)  Pulse: (!) 120 (05/14/18 1456)  Resp: (!) 22 (05/14/18 1243)  BP: 128/73 (05/14/18 1243)  SpO2: 100 % (05/14/18 1243) Vital Signs (24h Range):  Temp:  [97.5 °F (36.4 °C)-98.5 °F (36.9 °C)] 97.5 °F (36.4 °C)  Pulse:  [105-120] 120  Resp:   [18-22] 22  SpO2:  [90 %-100 %] 100 %  BP: (110-148)/() 128/73     No data found.    Body mass index is 27.31 kg/m².      Intake/Output Summary (Last 24 hours) at 05/14/18 1611  Last data filed at 05/14/18 1500   Gross per 24 hour   Intake             1000 ml   Output             3600 ml   Net            -2600 ml       Hemodynamic Parameters:       Telemetry:     Physical Exam   Constitutional: He is oriented to person, place, and time.   Neck: No JVD present.   Decanulated track. Dressing in place   Cardiovascular: Regular rhythm and normal heart sounds.    Tachycardia noted.   Pulmonary/Chest: Effort normal and breath sounds normal.   Abdominal: Soft. Bowel sounds are normal.   Musculoskeletal: He exhibits no edema or tenderness.   Neurological: He is alert and oriented to person, place, and time.   Skin: Skin is warm and dry.   Psychiatric:   Flat affect but able to participate in interviw   Nursing note and vitals reviewed.         Significant Labs:  CBC:    Recent Labs  Lab 05/12/18  0500 05/13/18  0600 05/14/18  0600   WBC 9.49 6.46 16.31*   RBC 2.82* 2.84* 3.15*   HGB 8.4* 8.4* 9.3*   HCT 28.6* 27.8* 30.2*    308 502*   * 98 96   MCH 29.8 29.6 29.5   MCHC 29.4* 30.2* 30.8*     BNP:  No results for input(s): BNP in the last 168 hours.    Invalid input(s): BNPTRIAGELBLO  CMP:    Recent Labs  Lab 05/12/18  0500 05/13/18  0600 05/14/18  0600   * 307* 297*   CALCIUM 9.2 9.1 9.3   ALBUMIN 2.2* 2.2* 2.4*   PROT 6.2 6.1 6.2   * 133* 130*   K 4.4 4.6 4.5   CO2 18* 22* 19*   CL 99 99 95   BUN 17 31* 43*   CREATININE 2.2* 3.0* 3.4*   ALKPHOS 162* 142* 142*   ALT 10 9* 9*   AST 17 10 11   BILITOT 0.5 0.4 0.4      Coagulation:   No results for input(s): PT, INR, APTT in the last 168 hours.  LDH:  No results for input(s): LDH in the last 72 hours.  Microbiology:  Microbiology Results (last 7 days)     Procedure Component Value Units Date/Time    Culture, Anaerobe [[id number]] Collected:   05/10/18 1411    Order Status:  Completed Specimen:  Ascites from Ascites Updated:  05/14/18 1335     Anaerobic Culture Culture in progress    Aerobic culture [[id number]] Collected:  05/10/18 1411    Order Status:  Completed Specimen:  Ascites from Ascites Updated:  05/14/18 0916     Aerobic Bacterial Culture No growth    Blood culture [[id number]] Collected:  05/05/18 0644    Order Status:  Completed Specimen:  Blood from Midline, Basilic, Right Updated:  05/10/18 1012     Blood Culture, Routine No growth after 5 days.    Blood culture [[id number]] Collected:  05/05/18 0812    Order Status:  Completed Specimen:  Blood Updated:  05/10/18 1012     Blood Culture, Routine No growth after 5 days.    Narrative:       Collection has been rescheduled by CDP at 5/5/2018 07:40 Reason: Due   now  Collection has been rescheduled by CDP at 5/5/2018 07:40 Reason: Due   now    AFB Culture & Smear [[id number]] Collected:  03/28/18 1607    Order Status:  Completed Specimen:  Body Fluid from Lung, RLL Updated:  05/10/18 0927     AFB Culture & Smear Culture in progress     AFB Culture & Smear Culture in progress     AFB CULTURE STAIN No acid fast bacilli seen.    Respiratory Viral Panel by PCR [facility]; Nasal Swab [[id number]] Collected:  05/05/18 0644    Order Status:  Completed Specimen:  Respiratory Updated:  05/08/18 1138     Respiratory Virus Panel, source Nasal Swab     RVP - Adenovirus Not Detected     Comment: Detects Serotypes B and E. Detection of Serotype C may   be limited. If Adenovirus infection is suspected and a   Not Detected result is returned the sample should be   re-tested for Adenovirus using an independent method  (e.g. [employer] Adenovirus Quantitative Real-Time  PCR test.          Enterovirus Not Detected     Comment: Cross-reactivity has been observed between certain Rhinovirus  strains and the Enterovirus assay.          Human Bocavirus Not Detected     Human Coronavirus Not Detected     Comment: The  Human Coronavirus assay detects Human coronavirus types  229E, OC43,NL63 and HKU1.          RVP - Human Metapneumovirus (hMPV) Not Detected     RVP - Influenza A Not Detected     Influenza A - V3T7-70 Not Detected     RVP - Influenza B Not Detected     Parainfluenza Not Detected     Respiratory Syncytial VirusVirus (RSV) A Not Detected     Comment: The Respiratory Syncytial Viral assay detects types A and B,  however it does not distinguish between the two.          RVP - Rhinovirus Not Detected     Comment: Cross-Reactivity has been observed between certain   Rhinovirus strains and the Enterovirus assay.  Target Enriched Mulitplex Polymerase Chain Reaction (TEM-PCR)  allows for the detection of multiple pathogens out of a single  reaction.  This test was developed and its performance   characteristics determined by [employer].  It has not   been cleared or approved by the U.S.Food and Drug Administration.  Results should be used in conjunction with clinical findings,   and should not form the sole basis for a diagnosis or treatment  decision.  TEM-PCR is a licensed technology of [employer].         Narrative:       Receiving Lab:->[facility]          I have reviewed all pertinent labs within the past 24 hours.    Estimated Creatinine Clearance: 25.9 mL/min (A) (based on SCr of 3.4 mg/dL (H)).    Diagnostic Results:

## 2018-05-14 NOTE — ASSESSMENT & PLAN NOTE
- TTE 4/24/18 showed normal graft function but has known history of restrictive CM post transplant.  - Continue Prednisone 5mg daily and Cellcept 250mg BID   - Continue Tacrolimus 5/5 with plan to adjust as needed for goal 6-10  - Tacro level 9.8 today

## 2018-05-14 NOTE — ASSESSMENT & PLAN NOTE
- present this morning  - isolated on presentation.  - Possibly a lab error  - will observe and consider infection romero if present tomorrow  - check differential tomorrow

## 2018-05-14 NOTE — ASSESSMENT & PLAN NOTE
- Resolved. Now with trach decannulation  - Etiology = RSV   - Stoma care / education provided by pulmonology   - Continue pulmonary toilet to help with respiratory mechanics   - Appreciate PT/OT/Wound care.  - Plan is to finish 7 day course of moxifloxacin   - D/C pulse steroids on 5/14  - Continue to wean HFNC and pulmonary toilet  - repeat CXR shows improved aeration  - HD with UF of 3Liters removal

## 2018-05-14 NOTE — ASSESSMENT & PLAN NOTE
baseline sCr 2.6-3.0 consistent with CKD stage IV  - anuric CACHORRO likely ischemic ATN from prolonged pre-renal state (diarrhea) in setting of prograf renal vasoconstriction; cannot r/o septic ATN or Prograf toxicity  - SLED started 3/14. TDC placed 4/4/18.   -Trach and PEG removed 5/10.   -5/14 Remains anuric. Patient seen in [facility]. Tolerating 3 L UF. UF only last hr of HD treatment. Dialysate adjusted. Albumin and midodrine PRN. Continue MWF HD while inpatient.      Ascites  -Reports PTA paracentesis every 1-2 months with 2-3 L removal each time.   -For IR paracentesis tomorrow.      Anemia of CKD  -Iron 70, T sat 35, TIBC 225 and ferritan 1287  -Hgb improving.   -Continue epo. Increased epo 5/4     BMD  Lab Results   Component Value Date    PTH 23.0 04/19/2018    CALCIUM 9.3 05/14/2018    CAION 0.98 (L) 03/17/2018    PHOS 5.4 (H) 05/14/2018   Renal diet.

## 2018-05-14 NOTE — PROGRESS NOTES
Patient arrived on unit via stretcher. Weight obtained in bed @ 76.9kg. Dialysis  tx initiated  via left CVC. Ports aspirated and flushed without difficulty. Lines connected and secured. Orders and machine settings verified. Tx started.

## 2018-05-14 NOTE — NURSING
Rounds Report: Attended interdisciplinary rounds this morning with the transplant team including SW, physicians, fellows,  mid-level providers, and transplant coordinators.  Discussed plan of care, including placement in Rehab facility. They need to have dialysis available which has been a problem. Pt having bouts of nausea but no etiology yet. His WBC's are rising so the team will be working on this aspect. Discharge not planned until at least the end of the week.

## 2018-05-14 NOTE — PROGRESS NOTES
[facility]  Heart Transplant  Progress Note    Patient Name: Lv Crocker  MRN: [id number]  Admission Date: 3/11/2018  Hospital Length of Stay: 63 days  Attending Physician: Jose A Lloyd MD  Primary Care Provider: Philippe Mohr MD  Principal Problem:Acute respiratory failure with hypoxia    Subjective:     Interval History:     Had nausea this morning before and during HD. Reported some mild emesis after HD and feeling tired after HD. Isolated leukocytosis noted on labs this morning > 16K. Managed to remove Liters UF today without hemodynamic issues. Scheduled for paracentesis tomorrow which may help with nausea. Respiratory PCR on 5/5 positive for persistent RSV.     Continuous Infusions:  Scheduled Meds:   acetaminophen  650 mg Oral TID    cetirizine  5 mg Oral Daily    docusate sodium  100 mg Oral BID    epoetin jose miguel (PROCRIT) injection  100 Units/kg (Dosing Weight) Intravenous Every Mon, Wed, Fri    escitalopram oxalate  20 mg Oral Daily    famotidine  20 mg Oral QHS    guaiFENesin  1,200 mg Oral BID    heparin (porcine)  5,000 Units Subcutaneous Q12H    insulin aspart U-100  8 Units Subcutaneous TIDWM    insulin detemir U-100  20 Units Subcutaneous Daily    levalbuterol  0.63 mg Nebulization Q4H    levothyroxine  137 mcg Oral Before breakfast    midodrine  10 mg Oral TID    moxifloxacin  400 mg Oral Daily    mycophenolate  250 mg Oral BID    polyethylene glycol  17 g Oral BID    predniSONE  5 mg Oral Daily    QUEtiapine  50 mg Oral QHS    tacrolimus  5 mg Oral BID     PRN Meds:sodium chloride 0.9%, sodium chloride 0.9%, albuterol-ipratropium 2.5mg-0.5mg/3mL, ALPRAZolam, benzonatate, calcium carbonate, dextrose 50%, dextrose 50%, glucagon (human recombinant), glucose, glucose, heparin (porcine), insulin aspart U-100, midodrine, ondansetron, oxyCODONE, promethazine (PHENERGAN) IVPB    Review of patient's allergies indicates:   Allergen Reactions    No known  drug allergies      Objective:     Vital Signs (Most Recent):  Temp: 97.5 °F (36.4 °C) (05/14/18 1243)  Pulse: (!) 120 (05/14/18 1456)  Resp: (!) 22 (05/14/18 1243)  BP: 128/73 (05/14/18 1243)  SpO2: 100 % (05/14/18 1243) Vital Signs (24h Range):  Temp:  [97.5 °F (36.4 °C)-98.5 °F (36.9 °C)] 97.5 °F (36.4 °C)  Pulse:  [105-120] 120  Resp:  [18-22] 22  SpO2:  [90 %-100 %] 100 %  BP: (110-148)/() 128/73     No data found.    Body mass index is 27.31 kg/m².      Intake/Output Summary (Last 24 hours) at 05/14/18 1611  Last data filed at 05/14/18 1500   Gross per 24 hour   Intake             1000 ml   Output             3600 ml   Net            -2600 ml       Hemodynamic Parameters:       Telemetry:     Physical Exam   Constitutional: He is oriented to person, place, and time.   Neck: No JVD present.   Decanulated track. Dressing in place   Cardiovascular: Regular rhythm and normal heart sounds.    Tachycardia noted.   Pulmonary/Chest: Effort normal and breath sounds normal.   Abdominal: Soft. Bowel sounds are normal.   Musculoskeletal: He exhibits no edema or tenderness.   Neurological: He is alert and oriented to person, place, and time.   Skin: Skin is warm and dry.   Psychiatric:   Flat affect but able to participate in interviw   Nursing note and vitals reviewed.         Significant Labs:  CBC:    Recent Labs  Lab 05/12/18  0500 05/13/18  0600 05/14/18  0600   WBC 9.49 6.46 16.31*   RBC 2.82* 2.84* 3.15*   HGB 8.4* 8.4* 9.3*   HCT 28.6* 27.8* 30.2*    308 502*   * 98 96   MCH 29.8 29.6 29.5   MCHC 29.4* 30.2* 30.8*     BNP:  No results for input(s): BNP in the last 168 hours.    Invalid input(s): BNPTRIAGELBLO  CMP:    Recent Labs  Lab 05/12/18  0500 05/13/18  0600 05/14/18  0600   * 307* 297*   CALCIUM 9.2 9.1 9.3   ALBUMIN 2.2* 2.2* 2.4*   PROT 6.2 6.1 6.2   * 133* 130*   K 4.4 4.6 4.5   CO2 18* 22* 19*   CL 99 99 95   BUN 17 31* 43*   CREATININE 2.2* 3.0* 3.4*   ALKPHOS 162* 142*  142*   ALT 10 9* 9*   AST 17 10 11   BILITOT 0.5 0.4 0.4      Coagulation:   No results for input(s): PT, INR, APTT in the last 168 hours.  LDH:  No results for input(s): LDH in the last 72 hours.  Microbiology:  Microbiology Results (last 7 days)     Procedure Component Value Units Date/Time    Culture, Anaerobe [[id number]] Collected:  05/10/18 1411    Order Status:  Completed Specimen:  Ascites from Ascites Updated:  05/14/18 1335     Anaerobic Culture Culture in progress    Aerobic culture [[id number]] Collected:  05/10/18 1411    Order Status:  Completed Specimen:  Ascites from Ascites Updated:  05/14/18 0916     Aerobic Bacterial Culture No growth    Blood culture [[id number]] Collected:  05/05/18 0644    Order Status:  Completed Specimen:  Blood from Midline, Basilic, Right Updated:  05/10/18 1012     Blood Culture, Routine No growth after 5 days.    Blood culture [[id number]] Collected:  05/05/18 0812    Order Status:  Completed Specimen:  Blood Updated:  05/10/18 1012     Blood Culture, Routine No growth after 5 days.    Narrative:       Collection has been rescheduled by CDP at 5/5/2018 07:40 Reason: Due   now  Collection has been rescheduled by CDP at 5/5/2018 07:40 Reason: Due   now    AFB Culture & Smear [[id number]] Collected:  03/28/18 1607    Order Status:  Completed Specimen:  Body Fluid from Lung, RLL Updated:  05/10/18 0927     AFB Culture & Smear Culture in progress     AFB Culture & Smear Culture in progress     AFB CULTURE STAIN No acid fast bacilli seen.    Respiratory Viral Panel by PCR [facility]; Nasal Swab [[id number]] Collected:  05/05/18 0644    Order Status:  Completed Specimen:  Respiratory Updated:  05/08/18 1138     Respiratory Virus Panel, source Nasal Swab     RVP - Adenovirus Not Detected     Comment: Detects Serotypes B and E. Detection of Serotype C may   be limited. If Adenovirus infection is suspected and a   Not Detected result is returned the sample should be   re-tested for  Adenovirus using an independent method  (e.g. [employer] Aquaporins Adenovirus Quantitative Real-Time  PCR test.          Enterovirus Not Detected     Comment: Cross-reactivity has been observed between certain Rhinovirus  strains and the Enterovirus assay.          Human Bocavirus Not Detected     Human Coronavirus Not Detected     Comment: The Human Coronavirus assay detects Human coronavirus types  229E, OC43,NL63 and HKU1.          RVP - Human Metapneumovirus (hMPV) Not Detected     RVP - Influenza A Not Detected     Influenza A - L8E7-31 Not Detected     RVP - Influenza B Not Detected     Parainfluenza Not Detected     Respiratory Syncytial VirusVirus (RSV) A Not Detected     Comment: The Respiratory Syncytial Viral assay detects types A and B,  however it does not distinguish between the two.          RVP - Rhinovirus Not Detected     Comment: Cross-Reactivity has been observed between certain   Rhinovirus strains and the Enterovirus assay.  Target Enriched Mulitplex Polymerase Chain Reaction (TEM-PCR)  allows for the detection of multiple pathogens out of a single  reaction.  This test was developed and its performance   characteristics determined by [employer].  It has not   been cleared or approved by the U.S.Food and Drug Administration.  Results should be used in conjunction with clinical findings,   and should not form the sole basis for a diagnosis or treatment  decision.  TEM-PCR is a licensed technology of [employer], [employer].         Narrative:       Receiving Lab:->[facility]          I have reviewed all pertinent labs within the past 24 hours.    Estimated Creatinine Clearance: 25.9 mL/min (A) (based on SCr of 3.4 mg/dL (H)).    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  Most recently has had ongoing issues with his lungs, had gotten 2 thoracenteses, after which he underwent VATS 01/19/18 followed by pleurex catheter placement and effusion drainage 01/11/18, subsequently had it removed 02/07/18 after drainage had decreased despite multiple attempts to drain it. Has been having significant coughing fits that result in him being near-syncopal at times, although difficult to say if he has passed out or not- patient most recently was admitted to the hospital for increased AGUILAR, coughing and pre-syncope 02/11-02/14/18 at [facility].   Patient was started on antibiotics for suspected bacterial infection, with some diarrhea, bronchitis, and possible pneumonia. Ct chest showed some pleural fluid collection and after talking with Dr. James, we arranged for him to receive a diagnostic tap with IR, from which the fluid collection demonstrated no growth or significant findings at all really. Cell counts do not appear to have been sent but will recheck. Patient states since his hospital stay, yesterday had a significant coughing fit again, after which he nearly passed out, is being seen in clinic today for this. Denies any cardiopulmonary complaints, no leg swelling, has some abdominal swelling, which is moderate for him. Denies significant shortness of breath with mild exertion, had 6MWT yesterday to see if he qualified for home O2, and his O2 sats actually improved after recovery from where he started initially. He and his wife admit he is in bed most days, not very active.     Mr. Crocker presented to the ED 3/11/18 with approximately 3 weeks of worsening diarrhea and 2-3 days of sinus pressure, drainage, and worsened cough.  He notes that he had a coughing fit last night and passed out, coughed throughout most of the night.  This also happened on 2/25/18.  He last saw Dr Ferreira in clinic on 2/20/18 where he was taken off myfortic and switched to cellcept (he hadn't been on cellcept because of  leukopenia when they tried post-transplant).  Though his diarrhea had improved after his last discharge, he states it has increased now to 15x/day, clear/yellow/green, no fevers, no exacerbating foods per say.  He was taken back off the cellcept and placed back on myfortic this past week and has not noticed immediate change in diarrhea. He also reports his baseline coughing fits havent improved and are minimally responsive to tessalon pearls.  Came on last night, he lost consciousness during a fit once which is relatively normal for him, and had two more during HPI.  He notes no sick contacts but does state he's had some URI symptoms as above.  His baseline vitals are usually -120 and BP 90s/60s-110s/70s.  He reports a history of intermittent diarrhea - did have Cdiff many years ago but this smells different.  No abdominal pain, no nausea, no vomiting.  No blood in diarrhea.  Appears last c-scope in 2015 with no evidence of infection or inflammatory processes.  He does report IBS which is different that this.       * Acute respiratory failure with hypoxia    - Resolved. Now with trach decannulation  - Etiology = RSV   - Stoma care / education provided by pulmonology   - Continue pulmonary toilet to help with respiratory mechanics   - Appreciate PT/OT/Wound care.  - Plan is to finish 7 day course of moxifloxacin   - D/C pulse steroids on 5/14  - Continue to wean HFNC and pulmonary toilet  - repeat CXR shows improved aeration  - HD with UF of 3Liters removal        Heart transplanted    - TTE 4/24/18 showed normal graft function but has known history of restrictive CM post transplant.  - Continue Prednisone 5mg daily and Cellcept 250mg BID   - Continue Tacrolimus 5/5 with plan to adjust as needed for goal 6-10  - Tacro level 9.8 today        Other ascites    - planned for Paracentesis tomorrow  - Prior samples negative for infection  - will ask for cultures and GS tomorrow        Leukocytosis    - present this  morning  - isolated on presentation.  - Possibly a lab error  - will observe and consider infection romero if present tomorrow  - check differential tomorrow        Abdominal distention    - Repeat paracentesis requested for Tuesday 5/15 (Spoke with IR)   - Albumin if >5Ls removed   - Only 300mL removed with previous paracentesis (5/10/18)        Alteration in skin integrity    - Dry gangrene on toes. No surgical intervention indicated.   - Wound care following. Local care only at this point.         ESRD (end stage renal disease)    - Appreciate Nephrology recs.   - Continue midodrine  - HD per nephrology [facility].         Restrictive cardiomyopathy    - Requested more fluid removal with dialysis         Type 1 diabetes mellitus with stage 3 chronic kidney disease    - Appreciate Endocrine's recs  - continue basal bolus with uptitration as needed   - BS have been high due to steroid pulse         Hypothyroidism due to Hashimoto's thyroiditis    - Appreciate Endocrine's recs  - Switched to PO Levothyroxine 137mcg daily        Anemia    - Nephrology is managing ProCrit   - Hgb stable         Debility    - PT/OT/SLP daily. Recommending rehab.  - Nutrition following. Regular diet with thin liquids  - Passed MBSS with speech.  - awaiting placement but still unstable for          Anxiety    - Increased Lexapro to 20mg daily  - Continue seroquel 50 QHS   - Continue .5mg xanax PRN Q8H   - Appreciate psychiatry input.           Discussed plan of care with Dr. ranjit Walsh MD  Heart Transplant  [facility]

## 2018-05-14 NOTE — PT/OT/SLP PROGRESS
Occupational Therapy      Patient Name:  Lv Crocker   MRN:  [id number]    Patient not seen today secondary to  (Pt of the floor for Diaysis in AM. OT attempted in PM, RN stated to hold on therapy at this time 2* pt feeling nauseous and vomitting this afternoon). Will follow-up next scheduled OT session.    Leonor Wagner, OT  5/14/2018

## 2018-05-14 NOTE — ASSESSMENT & PLAN NOTE
- Appreciate Endocrine's recs  - continue basal bolus with uptitration as needed   - BS have been high due to steroid pulse

## 2018-05-14 NOTE — PROGRESS NOTES
[facility]  Nephrology  Progress Note    Patient Name: Lv Crocker  MRN: [id number]  Admission Date: 3/11/2018  Hospital Length of Stay: 63 days  Attending Provider: Jose A Lloyd MD   Primary Care Physician: Philippe Mohr MD  Principal Problem:Acute respiratory failure with hypoxia    Subjective:     HPI: Mr. Crocker is a 41 yo WM with T1DM, Hashimoto thyroiditis, h/o OHTx 11/2014, and CKD stage 4 who was admitted on 3/11/18 for persistent diarrhea. He reported a 3 week history of diarrhea up to 15x/day. Associated symptoms including URI symptoms, coughing fits, and coughing syncope. Prograf level was 14.3. His post-heart transplant course has been complicated by AMR 4/2015, CMV, post-transplant restrictive cardiomyopathy, recurrent pleural effusions/ascites requiring monthly thoracenteses/paracenteses, VATS 1/11/18 with Pleur-X catheter (now removed), and CKD stage 4 with baseline sCr 2.6-3.0. Baseline -120s and baseline BP 90s-110s/60-70s. Upon admission he was started on IVF and diarrhea workup was initiated. On 3/12 he was noted to have decreased UOP despite fluids; NS was increased to 100cc/hr. He was scheduled for a colonoscopy on 3/13 however procedure was cancelled due to hypoxia and fever. He was transferred to the ICU for closer monitoring. He continued to have oliguria overnight. Bladder scan revealed 200cc of urine but patient refused osullivan catheter placement. Wife reports that patient always has a hard time urinating again after osullivan catheters are placed/removed so he refuses them. He remained hypoxic despite FiO2 70% and ABG revealed combined respiratory and metabolic acidosis with pH 7.17. He was started on BiPAP as well as a bicarb infusion at 50cc/hr. ABG with mild improvement. AM labs revealed K 6.2 and he was shifted and given kayexalate.Trialysis catheter was placed this morning and he subsequently developed hypotension and was started on levophed. He was  intubated later this morning. Nephrology consulted for CACHORRO. All history obtained by primary team and chart review as patient was intubated/sedated on exam. Consent for dialysis obtained by patient's wife and placed in chart. Of note, both of patient's parents were on dialysis.     Interval History: Seen in [facility]. Last hour of 3.5 hrs UF only. Tolerated 3 L net UF.   Multiple of emesis yesterday. Plan for paracentesis tomorrow.     Review of patient's allergies indicates:   Allergen Reactions    No known drug allergies      Current Facility-Administered Medications   Medication Frequency    0.9%  NaCl infusion PRN    0.9%  NaCl infusion PRN    acetaminophen tablet 650 mg TID    albuterol-ipratropium 2.5mg-0.5mg/3mL nebulizer solution 3 mL Q4H PRN    ALPRAZolam tablet 0.5 mg Q6H PRN    benzonatate capsule 100 mg TID PRN    calcium carbonate 200 mg calcium (500 mg) chewable tablet 500 mg BID PRN    cetirizine tablet 5 mg Daily    dextrose 50% injection 12.5 g PRN    dextrose 50% injection 25 g PRN    docusate sodium capsule 100 mg BID    epoetin jose miguel injection 8,100 Units Every Mon, Wed, Fri    escitalopram oxalate tablet 20 mg Daily    famotidine tablet 20 mg QHS    glucagon (human recombinant) injection 1 mg PRN    glucose chewable tablet 16 g PRN    glucose chewable tablet 24 g PRN    guaiFENesin 12 hr tablet 1,200 mg BID    heparin (porcine) injection 1,000 Units PRN    heparin (porcine) injection 5,000 Units Q12H    insulin aspart U-100 pen 0-5 Units QID (AC + HS) PRN    insulin aspart U-100 pen 8 Units TIDWM    insulin detemir U-100 pen 20 Units Daily    levalbuterol nebulizer solution 0.63 mg Q4H    levothyroxine tablet 137 mcg Before breakfast    midodrine tablet 10 mg TID    midodrine tablet 15 mg PRN    moxifloxacin tablet 400 mg Daily    mycophenolate capsule 250 mg BID    ondansetron disintegrating tablet 8 mg Q6H PRN    oxyCODONE immediate release tablet 5 mg Q6H PRN     polyethylene glycol packet 17 g BID    predniSONE tablet 5 mg Daily    [COMPLETED] prochlorperazine injection Soln 10 mg Once    promethazine (PHENERGAN) 25 mg in dextrose 5 % 50 mL IVPB Q6H PRN    QUEtiapine tablet 50 mg QHS    tacrolimus capsule 5 mg BID       Objective:     Vital Signs (Most Recent):  Temp: 97.5 °F (36.4 °C) (05/14/18 1243)  Pulse: (!) 114 (05/14/18 1243)  Resp: (!) 22 (05/14/18 1243)  BP: 128/73 (05/14/18 1243)  SpO2: 100 % (05/14/18 1243)  O2 Device (Oxygen Therapy): nasal cannula (05/14/18 1243) Vital Signs (24h Range):  Temp:  [97.5 °F (36.4 °C)-98.5 °F (36.9 °C)] 97.5 °F (36.4 °C)  Pulse:  [105-118] 114  Resp:  [18-22] 22  SpO2:  [90 %-100 %] 100 %  BP: (114-148)/() 128/73     Weight: 79.1 kg (174 lb 6.1 oz) (05/11/18 0400)  Body mass index is 27.31 kg/m².  Body surface area is 1.93 meters squared.    I/O last 3 completed shifts:  In: 1230 [P.O.:780; IV Piggyback:450]  Out: 0     Physical Exam   Constitutional: He appears well-developed. No distress.   Trach removed   HENT:   Head: Normocephalic and atraumatic.   Eyes: Conjunctivae and EOM are normal.   Cardiovascular: Regular rhythm.  Tachycardia present.    Pulmonary/Chest: He has no wheezes. He has no rales.   L IJ TDC   Abdominal: Soft. He exhibits no distension.   LUQ PEG removed  ascites   Neurological: He is alert.   Skin:   Right necrotic toes 1-3  Left great toe necrotic-same       Significant Labs:  All labs within the past 24 hours have been reviewed.     Significant Imaging:  Labs: Reviewed    Assessment/Plan:     CACHORRO (acute kidney injury) with ATN superimposed CKD 3     baseline sCr 2.6-3.0 consistent with CKD stage IV  - anuric CACHORRO likely ischemic ATN from prolonged pre-renal state (diarrhea) in setting of prograf renal vasoconstriction; cannot r/o septic ATN or Prograf toxicity  - SLED started 3/14. TDC placed 4/4/18.   -Trach and PEG removed 5/10.   -5/14 Remains anuric. Patient seen in [facility]. Tolerating 3 L UF. UF  only last hr of HD treatment. Dialysate adjusted. Albumin and midodrine PRN. Continue MWF HD while inpatient.      Ascites  -Reports PTA paracentesis every 1-2 months with 2-3 L removal each time.   -For IR paracentesis tomorrow.      Anemia of CKD  -Iron 70, T sat 35, TIBC 225 and ferritan 1287  -Hgb improving.   -Continue epo. Increased epo 5/4     BMD  Lab Results   Component Value Date    PTH 23.0 04/19/2018    CALCIUM 9.3 05/14/2018    CAION 0.98 (L) 03/17/2018    PHOS 5.4 (H) 05/14/2018   Renal diet.                   Thank you for your consult. I will follow-up with patient. Please contact us if you have any additional questions.    Amanda Cuellar NP  Nephrology  [facility]

## 2018-05-14 NOTE — SUBJECTIVE & OBJECTIVE
Interval History: Seen in [facility]. Last hour of 3.5 hrs UF only. Tolerated 3 L net UF.   Multiple of emesis yesterday. Plan for paracentesis tomorrow.     Review of patient's allergies indicates:   Allergen Reactions    No known drug allergies      Current Facility-Administered Medications   Medication Frequency    0.9%  NaCl infusion PRN    0.9%  NaCl infusion PRN    acetaminophen tablet 650 mg TID    albuterol-ipratropium 2.5mg-0.5mg/3mL nebulizer solution 3 mL Q4H PRN    ALPRAZolam tablet 0.5 mg Q6H PRN    benzonatate capsule 100 mg TID PRN    calcium carbonate 200 mg calcium (500 mg) chewable tablet 500 mg BID PRN    cetirizine tablet 5 mg Daily    dextrose 50% injection 12.5 g PRN    dextrose 50% injection 25 g PRN    docusate sodium capsule 100 mg BID    epoetin jose miguel injection 8,100 Units Every Mon, Wed, Fri    escitalopram oxalate tablet 20 mg Daily    famotidine tablet 20 mg QHS    glucagon (human recombinant) injection 1 mg PRN    glucose chewable tablet 16 g PRN    glucose chewable tablet 24 g PRN    guaiFENesin 12 hr tablet 1,200 mg BID    heparin (porcine) injection 1,000 Units PRN    heparin (porcine) injection 5,000 Units Q12H    insulin aspart U-100 pen 0-5 Units QID (AC + HS) PRN    insulin aspart U-100 pen 8 Units TIDWM    insulin detemir U-100 pen 20 Units Daily    levalbuterol nebulizer solution 0.63 mg Q4H    levothyroxine tablet 137 mcg Before breakfast    midodrine tablet 10 mg TID    midodrine tablet 15 mg PRN    moxifloxacin tablet 400 mg Daily    mycophenolate capsule 250 mg BID    ondansetron disintegrating tablet 8 mg Q6H PRN    oxyCODONE immediate release tablet 5 mg Q6H PRN    polyethylene glycol packet 17 g BID    predniSONE tablet 5 mg Daily    [COMPLETED] prochlorperazine injection Soln 10 mg Once    promethazine (PHENERGAN) 25 mg in dextrose 5 % 50 mL IVPB Q6H PRN    QUEtiapine tablet 50 mg QHS    tacrolimus capsule 5 mg BID       Objective:      Vital Signs (Most Recent):  Temp: 97.5 °F (36.4 °C) (05/14/18 1243)  Pulse: (!) 114 (05/14/18 1243)  Resp: (!) 22 (05/14/18 1243)  BP: 128/73 (05/14/18 1243)  SpO2: 100 % (05/14/18 1243)  O2 Device (Oxygen Therapy): nasal cannula (05/14/18 1243) Vital Signs (24h Range):  Temp:  [97.5 °F (36.4 °C)-98.5 °F (36.9 °C)] 97.5 °F (36.4 °C)  Pulse:  [105-118] 114  Resp:  [18-22] 22  SpO2:  [90 %-100 %] 100 %  BP: (114-148)/() 128/73     Weight: 79.1 kg (174 lb 6.1 oz) (05/11/18 0400)  Body mass index is 27.31 kg/m².  Body surface area is 1.93 meters squared.    I/O last 3 completed shifts:  In: 1230 [P.O.:780; IV Piggyback:450]  Out: 0     Physical Exam   Constitutional: He appears well-developed. No distress.   Trach removed   HENT:   Head: Normocephalic and atraumatic.   Eyes: Conjunctivae and EOM are normal.   Cardiovascular: Regular rhythm.  Tachycardia present.    Pulmonary/Chest: He has no wheezes. He has no rales.   L IJ TDC   Abdominal: Soft. He exhibits no distension.   LUQ PEG removed  ascites   Neurological: He is alert.   Skin:   Right necrotic toes 1-3  Left great toe necrotic-same       Significant Labs:  All labs within the past 24 hours have been reviewed.     Significant Imaging:  Labs: Reviewed

## 2018-05-14 NOTE — ASSESSMENT & PLAN NOTE
- PT/OT/SLP daily. Recommending rehab.  - Nutrition following. Regular diet with thin liquids  - Passed MBSS with speech.  - awaiting placement but still unstable for

## 2018-05-15 PROBLEM — D72.829 LEUKOCYTOSIS: Status: ACTIVE | Noted: 2018-01-01

## 2018-05-15 NOTE — ASSESSMENT & PLAN NOTE
- PT/OT/SLP daily. Recommending rehab.  - Nutrition following. Regular diet with thin liquids  - Passed MBSS with speech.  - awaiting placement to rehab.  on plan

## 2018-05-15 NOTE — SUBJECTIVE & OBJECTIVE
"Interval HPI:   Overnight events:   AF, tachycardic, otherwise VSS.    Patient was away at HD yesterday, with BG at goal.  However, overnight patient experienced hypoglycemia into the 50s.   Received Dex 25g.  Subsequent normal BG.    Reports nausea overnight with minimal PO intake yesterday.    Received Lev 20, and 1u SSI.    /84 (BP Location: Left arm, Patient Position: Lying)   Pulse (!) 115   Temp 97.4 °F (36.3 °C) (Oral)   Resp 18   Ht 5' 7" (1.702 m)   Wt 79.1 kg (174 lb 6.1 oz)   SpO2 99%   BMI 27.31 kg/m²     Labs Reviewed and Include      Recent Labs  Lab 05/15/18  0400      CALCIUM 8.5*   ALBUMIN 2.3*   PROT 5.9*   *   K 3.2*   CO2 24   CL 93*   BUN 28*   CREATININE 2.6*   ALKPHOS 132   ALT 7*   AST 10   BILITOT 0.4     Lab Results   Component Value Date    WBC 7.60 05/15/2018    HGB 8.7 (L) 05/15/2018    HCT 27.8 (L) 05/15/2018    MCV 95 05/15/2018     05/15/2018     No results for input(s): TSH, FREET4 in the last 168 hours.  Lab Results   Component Value Date    HGBA1C 5.1 04/05/2018       Nutritional status:   Body mass index is 27.31 kg/m².  Lab Results   Component Value Date    ALBUMIN 2.3 (L) 05/15/2018    ALBUMIN 2.4 (L) 05/14/2018    ALBUMIN 2.2 (L) 05/13/2018     Lab Results   Component Value Date    PREALBUMIN 13 (L) 04/08/2018    PREALBUMIN 5 (L) 03/15/2018    PREALBUMIN 18 (L) 03/24/2015       Estimated Creatinine Clearance: 33.9 mL/min (A) (based on SCr of 2.6 mg/dL (H)).    Accu-Checks  Recent Labs      05/12/18   2107  05/13/18   0753  05/13/18   1205  05/13/18   2140  05/14/18   0624  05/14/18   1111  05/14/18   1841  05/14/18   2158  05/14/18   2222  05/15/18   0859   POCTGLUCOSE  285*  316*  259*  251*  289*  153*  73  53*  180*  126*       Current Medications and/or Treatments Impacting Glycemic Control  Immunotherapy:  Immunosuppressants         Stop Route Frequency     tacrolimus capsule 5 mg      -- Oral 2 times daily     mycophenolate capsule 250 mg  "     -- Oral 2 times daily        Steroids:   Hormones     Start     Stop Route Frequency Ordered    05/13/18 1400  predniSONE tablet 5 mg      -- Oral Daily 05/13/18 1357        Pressors:    Autonomic Drugs     Start     Stop Route Frequency Ordered    04/30/18 0705  midodrine tablet 15 mg      -- Oral As needed (PRN) 04/30/18 0707    04/25/18 0900  midodrine tablet 10 mg      -- Oral 3 times daily 04/25/18 0650        Hyperglycemia/Diabetes Medications: Antihyperglycemics     Start     Stop Route Frequency Ordered    05/15/18 1130  insulin aspart U-100 pen 4-8 Units      -- SubQ 3 times daily with meals 05/15/18 0911    05/15/18 0915  insulin detemir U-100 pen 10 Units      -- SubQ 2 times daily 05/15/18 0911    05/10/18 1044  insulin aspart U-100 pen 0-5 Units      -- SubQ Before meals & nightly PRN 05/10/18 0945

## 2018-05-15 NOTE — PLAN OF CARE
Problem: Spiritual Distress, Risk/Actual (Adult,Obstetrics,Pediatric)  Intervention: Facilitate Personal Exploration/Expression of Spirituality  Provided f/u visit. Pt and pt's brother present.  offered reflective listening. Patient wanted to talk about his anxiety and issues surrounding it.  Visited with patient around an hour. Will continue to follow. Patient aware of 's presence as needed.

## 2018-05-15 NOTE — PLAN OF CARE
Problem: Physical Therapy Goal  Goal: Physical Therapy Goal  Goals to be met by: 18     Patient will increase functional independence with mobility by performin. Supine to sit with Moderate Assistance.  2. Sit to supine with Moderate Assistance.  3. Rolling to Left and Right with Minimal Assistance.   4. Sit to stand transfer with Moderate Assistance.  5. Bed to chair transfer with Maximum Assistance.  6. Gait  x 5 feet with Minimal Assistance.   7.  Pt to perf and be compliant with LE HEP to improve vascularity and mm strength.            Outcome: Ongoing (interventions implemented as appropriate)  Goals reviewed and remain appropriate. Pt progressing towards goals.    Petra Lake, PT, DPT   5/15/2018  [phone]

## 2018-05-15 NOTE — PT/OT/SLP PROGRESS
"Occupational Therapy   Treatment    Name: Lv Crocker  MRN: [id number]  Admitting Diagnosis:  Acute respiratory failure with hypoxia  41 Days Post-Op    Recommendations:     Discharge Recommendations: rehabilitation facility  Discharge Equipment Recommendations:   (TBD)  Barriers to discharge:  Inaccessible home environment, Decreased caregiver support    Subjective     Communicated with: RN prior to session. Pt found with HOB elevated resting. Pt agreeable to therapy session.   Pt stated, " Can you stretch my legs before we start."     Pain/Comfort:  · Pain Rating 1:  (Pt did not rate pain )  · Location - Side 1: Bilateral (R>L)  · Location - Orientation 1: generalized  · Location 1:  (knee and calf )  · Pain Addressed 1: Reposition, Distraction    Patients cultural, spiritual, Religion conflicts given the current situation: none stated     Objective:     Patient found with: telemetry, oxygen    General Precautions: Standard, fall, aspiration   Orthopedic Precautions:RLE weight bearing as tolerated, LLE weight bearing as tolerated   Braces:  (DARCO shoes )     Occupational Performance:    Bed Mobility:    · Patient completed Scooting/Bridging with maximal assistance and 2 persons for seated scooting at EOB and seated posterior scooting in medi-chair.   · Patient completed Supine to Sit with maximal assistance    Functional Mobility/Transfers:  · Patient completed Sit <> Stand Transfer with maximal assistance, of 2 persons and using no AD x 2 reps from EOB and x 1 rep from medi-chair   with  no assistive device   · Patient completed Bed <> Chair Transfer using Stand Pivot technique with maximal assistance and of 2 persons with no assistive device    Activities of Daily Living:  · UB Dressing: maximal assistance to don pullover shirt and don/doff gown infront while sitting EOB.   · LB Dressing: total assistance to don B  socks and don B DARCO shoes   · Toileting: total assistance for hygiene in standing " with max A x 2 for standing balance and 3rd person assisting with cleaning.     Patient left up in medichair  with all lines intact, call button in reach and RN notified    [facility] 6 Click:  [facility] Total Score: 12    Treatment & Education:  - OT returned 30 mins after first initial sesison to assist RN with drawsheet transfer from medichair to transport stretcher with x 3 person assist    - Pt performed AAROM of B UEs with OT assisting; pt performed the following AAROM exercises sitting EOB for x 5 reps for B UEs: elbow flexion/extension and chest press. Pt educated on performing self AAROM by utilizing stronger UE (LUE) to perform elbow flexion/extension.   - Pt educated on the importance of B hand pumps with resistance ball in order to promote improved blood flow and edema management.   - Increased time provided for repositioning pt in medi-chair   - Whiteboard updated   Education:    Assessment:     Lv Crocker is a 42 y.o. male with a medical diagnosis of Acute respiratory failure with hypoxia.  He presents with performance deficits affecting function are weakness, impaired endurance, impaired self care skills, impaired functional mobilty, gait instability, impaired balance, decreased upper extremity function, decreased lower extremity function, decreased ROM, impaired coordination, impaired fine motor, impaired skin, impaired cardiopulmonary response to activity, edema. Pt tolerated session well. Pt continues to require increased assist for self-care tasks and functional mobility. Pt demo'd improved L UE strength and encouraged to perform functional tasks as (I)'d as possible. Pt will continue to benefit from skilled OT in order to address the previously stated deficits and improve overall functional (I).     Rehab Prognosis:  good; patient would benefit from acute skilled OT services to address these deficits and reach maximum level of function.       Plan:     Patient to be seen 5 x/week to address  the above listed problems via self-care/home management, therapeutic activities, therapeutic exercises  · Plan of Care Expires: 05/31/18  · Plan of Care Reviewed with: patient    This Plan of care has been discussed with the patient who was involved in its development and understands and is in agreement with the identified goals and treatment plan    GOALS:    Occupational Therapy Goals        Problem: Occupational Therapy Goal    Goal Priority Disciplines Outcome Interventions   Occupational Therapy Goal     OT, PT/OT Ongoing (interventions implemented as appropriate)    Description:  Goals to be met by: 5/8/18 goal extended to be met by: 5/22/18    Pt will follow 75% of motor commands with <2 verbal cues for repetition of task to maximize engagement in grooming task.--MET  Pt will complete feeding with mod A.   Pt will complete with HOB slightly elevated to seated at EOB with mod A in prep for seated grooming task.  Pt will engage in BUE exercises in orders to increase strength to 3+/5 in BUE's to increase engagement in seated grooming task  Pt will sit at EOB for approx 10 minutes with mod A and unilateral UE support to complete grooming task - Progressing   Pt will complete sit>stand from EOB with bed in lowest position with mod A in prep for transfer to [facility]                         Time Tracking:     OT Date of Treatment: 05/15/18  OT Start Time: 1336 OT returned: 1447  OT Stop Time: 1421 OT ended: 1458  OT Total Time (min): 45 min + 11 min = 56 min total time       Billable Minutes:Self Care/Home Management 23  Therapeutic Activity 15  Neuromuscular Re-education 15    Leonor Wagner, OT  5/15/2018

## 2018-05-15 NOTE — PT/OT/SLP PROGRESS
Physical Therapy Treatment    Patient Name:  Lv Crocker   MRN:  [id number]    Recommendations:     Discharge Recommendations:  rehabilitation facility   Discharge Equipment Recommendations:  (TBD)   Barriers to discharge: Inaccessible home and Decreased caregiver support    Assessment:     Lv Crocker is a 42 y.o. male admitted with a medical diagnosis of Acute respiratory failure with hypoxia.  He presents with the following impairments/functional limitations:  weakness, impaired functional mobilty, decreased safety awareness, impaired coordination, impaired cardiopulmonary response to activity, impaired endurance, gait instability, decreased coordination, pain, impaired fine motor, impaired joint extensibility, impaired skin, edema, impaired muscle length, decreased ROM, decreased upper extremity function, abnormal tone, decreased lower extremity function, impaired balance, impaired self care skills. Pt continues to require increased assist to complete functional mobility. Able to transfer to medi-chair this date but with assist of 2 needed for all standing tasks. Improvements noted in LE ROM and seated balance. Pt would continue to benefit from skilled acute PT in order to address current deficits and progress functional mobility.     Rehab Prognosis:  good; patient would benefit from acute skilled PT services to address these deficits and reach maximum level of function.      Recent Surgery: Procedure(s) (LRB):  INSERTION-CATHETER-PERM-A-CATH (Left)  INSERTION-TUBE-GASTROSTOMY (N/A) 41 Days Post-Op    Plan:     During this hospitalization, patient to be seen 5 x/week to address the above listed problems via gait training, therapeutic activities, therapeutic exercises, neuromuscular re-education  · Plan of Care Expires:  06/01/18   Plan of Care Reviewed with: patient    Subjective     Communicated with RN prior to session.  Patient found supine upon PT entry to room, agreeable to treatment.   "    Chief Complaint: L knee and B calf pain   Patient comments/goals: "Can you stretch me out real good before we start?"  Pain/Comfort:  · Pain Rating 1:  (did not rate)  · Location 1:  (L knee and B calves)  · Pain Addressed 1: Reposition, Distraction    Patients cultural, spiritual, Jew conflicts given the current situation: none noted     Objective:     Patient found with: telemetry, oxygen     General Precautions: Standard, fall, aspiration   Orthopedic Precautions:RLE weight bearing as tolerated, LLE weight bearing as tolerated   Braces:  (BLE Darco shoes )     Functional Mobility:  · Bed Mobility:     · Scooting: maximal assistance of 2 persons (seated scooting along EOB x2 reps; seated posterior scooting in medi-chair)  · Supine to Sit: maximal assistance  · Transfers:     · Sit to Stand:  maximal assistance of 2 persons with no AD x2 reps from EOB, x1 rep from medi-chair  · Bed to Chair: maximal assistance of 2 persons with  no AD  using  Stand Pivot  · Balance:   · sitting: close SBA-Carmina    · standing: maxAx2      AM-PAC 6 CLICK MOBILITY  Turning over in bed (including adjusting bedclothes, sheets and blankets)?: 2  Sitting down on and standing up from a chair with arms (e.g., wheelchair, bedside commode, etc.): 2  Moving from lying on back to sitting on the side of the bed?: 2  Moving to and from a bed to a chair (including a wheelchair)?: 2  Need to walk in hospital room?: 1  Climbing 3-5 steps with a railing?: 1  Total Score: 10       Therapeutic Activities and Exercises:   Performed BLE gastroc stretches x20 sec x2 reps. Attempted L hamstring stretch but pt reporting increased L knee pain so HS stretch terminated  Transferred supine > sit EOB with maxA. Pt noted to be soiled upon sitting EOB. Doffed gown and donned t-shirt and hospital gown with assist. Donned B Darco shoes with assist. Performed sit<>stand from EOB and static standing with maxAx2 for hygiene to be performed. Performed seated " scooting along EOB x2 reps with maxAx2. Performed sit<>stand and stand pivot bed>medi-chair with maxAx2. Seated rest in medi-chair following. Performed sit<>stand from medi-chair with maxAx2 for improved seated positioning in medi-chair. Increased time spent adjusting pt position in medi-chair; performed posterior scooting with maxAx2 for improved position.   Pt instructed to continue performing BLE ankle DF AROM (I) daily. Pt also instructed in how to have family assist with BLE gastroc stretches. Pt v/u.     Patient left seated in medi-chair with all lines intact, call button in reach and RN notified..    GOALS:    Physical Therapy Goals        Problem: Physical Therapy Goal    Goal Priority Disciplines Outcome Goal Variances Interventions   Physical Therapy Goal     PT/OT, PT Ongoing (interventions implemented as appropriate)     Description:  Goals to be met by: 18     Patient will increase functional independence with mobility by performin. Supine to sit with Moderate Assistance.  2. Sit to supine with Moderate Assistance.  3. Rolling to Left and Right with Minimal Assistance.   4. Sit to stand transfer with Moderate Assistance.  5. Bed to chair transfer with Maximum Assistance.  6. Gait  x 5 feet with Minimal Assistance.   7.  Pt to perf and be compliant with LE HEP to improve vascularity and mm strength.                             Time Tracking:     PT Received On: 05/15/18  PT Start Time: 1338     PT Stop Time: 1423  PT Total Time (min): 45 min     Billable Minutes: Therapeutic Activity 15, Therapeutic Exercise 8 and Neuromuscular Re-education 15   (co-tx with OT)    Treatment Type: Treatment  PT/PTA: PT     PTA Visit Number: 0     Petra Lake PT, DPT   5/15/2018  [phone]

## 2018-05-15 NOTE — ASSESSMENT & PLAN NOTE
- TTE 4/24/18 showed normal graft function but has known history of restrictive CM post transplant.  - Continue Prednisone 5mg daily and Cellcept 250mg BID   - Tacro level 15.9 today from9.8 yesterday. Possibly from delayed dose due to nausea yesterday  - changeTacrolimus 4/5 per pharmacy starting this evening. Goal is 6-10

## 2018-05-15 NOTE — PLAN OF CARE
Problem: Patient Care Overview  Goal: Plan of Care Review  Outcome: Ongoing (interventions implemented as appropriate)  POC reviewed w/ pt and brother this am to include increasing activity, wound care, blood glucose management, promoting healthy respiratory functioning, and PO antibiotics.  Pt doing better today compared to yesterday.  Pt weaned from 4L to 2L HF NC this afternoon - sating mid to high 90's.  CXR done this am.  Pt strongly encouraged to do IS multiple times per hour - needs reinforcement.  Pt repositioned in bed frequently, sat up in chair for about 30 minutes today, worked w/ PT/OT.  Pt's trach dressing changed today.  Pt's blood glucose low today, appetite improved slightly from yesterday, insulin regimen adjusted per endocrine.  Para done this afternoon in IR.

## 2018-05-15 NOTE — ASSESSMENT & PLAN NOTE
- Resolved. Now with trach decannulation  - Etiology = RSV   - Stoma care / education provided by pulmonology   - Continue pulmonary toilet to help with respiratory mechanics   - Appreciate PT/OT/Wound care.  - Plan is to finish 7 day course of moxifloxacin   - D/C pulse steroids on 5/14. Will discuss with pharmacy if current 5mg is necessary for OH  - Continue to wean HFNC and pulmonary toilet  - repeat CXR shows improved aeration  - HD with UF of 3Liters removal

## 2018-05-15 NOTE — H&P
"Inpatient Radiology Pre-procedure Note    History of Present Illness:  Lv Crocker is a 42 y.o. male with ascites who presents for a paracentesis.  Admission H&P reviewed.  Past Medical History:   Diagnosis Date    Anxiety     Arthritis     Ascites     Thought to be 2/2 heart tpx; liver bx 3/31/17 w/o significant fibrosis    Cardiomyopathy     Depression     Controlled "for the most part" on Lexipro    Encounter for blood transfusion     during transplant    GERD (gastroesophageal reflux disease)     Hashimoto's disease     History of CHF (congestive heart failure)     Previously EF 20% (prior to heart transplant); last EF 60%, PAP 41 as of 12/12/17; throught to be 2/2 genetics & DM1    History of coronary artery disease     H/o Coronary artery disease; resolved since heart transplant 2014    History of myocardial infarction     h/o MI x3 prior to heart transplant    HLD (hyperlipidemia) 6/11/2015    Hx of psychiatric care     Hypoglycemia unawareness in type 1 diabetes mellitus     Hypothyroid     Initially hyperthyroid 2/2 Hoshimoto's thyroiditis; now on levothyroxine 150 mcg qd    Pleural effusion due to another disorder     Thought to be 2/2 heart tpx; s/p PleuRx catheter placement and removal after 1-2 months    Psychiatric problem     Pulmonary hypertension assoc with unclear multi-factorial mechanisms 12/12/2017    PAP 41 (EF 60%) on ECHO 12/12/17    Syncope 6/30/2015    Type I diabetes mellitus, well controlled     Well controlled; Dx'd @7y/o; on N insulin 20U BID & Humalog 10U w/meals +SSI; Glu 89 11/9/17; A1c 7.2% 4/5/17    Unspecified essential hypertension 05/29/2014    Well controlled; Last /57 on 11/9/17     Past Surgical History:   Procedure Laterality Date    CARDIAC CATHETERIZATION      CARDIAC SURGERY      CHOLECYSTECTOMY      COLONOSCOPY N/A 3/13/2018    Procedure: COLONOSCOPY;  Surgeon: Tim Spencer MD;  Location: [facility] ([address] [place] [address]);  Service: " Endoscopy;  Laterality: N/A;    CORONARY ANGIOPLASTY      FOOT SPLIT TRANSFER OF THE POSTERIOR TIBIALIS TENDON PROCEDURE      HEART TRANSPLANT  2014    KNEE SURGERY      PleuRx catheter placement  01/11/2018    Plan for removal after 1-2 months by Dr. James in cardiothoracic surgery    s/p oht  November 2014    stent placemnet         Review of Systems:   As documented in primary team H&P    Home Meds:   Prior to Admission medications    Medication Sig Start Date End Date Taking? Authorizing Provider   aspirin (ECOTRIN) 81 MG EC tablet Take 1 tablet (81 mg total) by mouth once daily. 2/14/18  Yes Gage Goodman MD   escitalopram oxalate (LEXAPRO) 20 MG tablet Take 1 tablet (20 mg total) by mouth once daily. 3/7/18  Yes Kanika Ferreira MD   gabapentin (NEURONTIN) 300 MG capsule Take 3 capsules (900 mg total) by mouth 3 (three) times daily. 2/14/18  Yes Gage Goodman MD   insulin aspart (NOVOLOG) 100 unit/mL InPn pen Inject 4 Units into the skin 3 (three) times daily. 2/15/18 2/15/19 Yes Gage Goodman MD   insulin detemir (LEVEMIR FLEXTOUCH) 100 unit/mL (3 mL) SubQ InPn pen Inject 15 Units into the skin every evening.  Patient taking differently: Inject 15 Units into the skin 2 (two) times daily.  2/14/18 2/14/19 Yes Gage Goodman MD   lancets Misc 1 Device by Misc.(Non-Drug; Combo Route) route 4 (four) times daily with meals and nightly. 12/24/14  Yes JOSE DANIEL Haskins   levothyroxine (SYNTHROID) 150 MCG tablet Take 1 tablet (150 mcg total) by mouth every morning. 5/31/17  Yes Monica Fernandez DNP, NP   magnesium oxide (MAG-OX) 400 mg tablet Take 1 tablet (400 mg total) by mouth once daily. 12/20/16  Yes Chandni Fernandez MD   mycophenolate (MYFORTIC) 360 MG TbEC Take 2 tablets (720 mg total) by mouth 2 (two) times daily. 3/8/18 3/8/19 Yes Kanika Ferreira MD   nortriptyline (PAMELOR) 25 MG capsule Take 1 capsule (25 mg total) by mouth every evening. 4/27/17  Yes Kanika Ferreira MD   ondansetron (ZOFRAN-ODT) 4  "MG TbDL Take 2 tablets (8 mg total) by mouth every 8 (eight) hours as needed (nausea). 6/21/17  Yes Philippe Mohr MD   pantoprazole (PROTONIX) 40 MG tablet TAKE ONE TABLET BY MOUTH EVERY DAY 8/15/16  Yes Tim aMo NP   pravastatin (PRAVACHOL) 40 MG tablet Take 1 tablet (40 mg total) by mouth every evening.  Patient taking differently: Take 40 mg by mouth once daily.  4/11/17  Yes Alexandr Hernández MD   predniSONE (DELTASONE) 2.5 MG tablet Take 1 tablet (2.5 mg total) by mouth once daily. S/P Heart Transplant 11/18/2014 ICD-10 Z94.1 1/10/18  Yes Alexandr Hernández MD   tacrolimus (PROGRAF) 1 MG Cap Taked 3mg orally in the morning and 3mg orally in the evening. S/p heart transplant  11/18/2014  ICD-10 Z94.1 2/14/18  Yes Gage Goodman MD   tamsulosin (FLOMAX) 0.4 mg Cp24 TAKE 2 CAPSULES(0.8 MG) BY MOUTH EVERY EVENING 2/7/18  Yes Alexandr Hernández MD   torsemide (DEMADEX) 20 MG Tab Take 2 tablets (40 mg total) by mouth once daily. 1/3/18 1/3/19 Yes Alexandr Hernández MD   pen needle, diabetic 32 gauge x 5/32" Ndle Uses 5 pen needles a day 4/5/17   Monica Fernandez, RA, NP     Scheduled Meds:    acetaminophen  650 mg Oral TID    cetirizine  5 mg Oral Daily    docusate sodium  100 mg Oral BID    epoetin jose miguel (PROCRIT) injection  100 Units/kg (Dosing Weight) Intravenous Every Mon, Wed, Fri    escitalopram oxalate  20 mg Oral Daily    famotidine  20 mg Oral QHS    guaiFENesin  1,200 mg Oral BID    heparin (porcine)  5,000 Units Subcutaneous Q12H    insulin aspart U-100  4-8 Units Subcutaneous TIDWM    insulin detemir U-100  9 Units Subcutaneous BID    levalbuterol  0.63 mg Nebulization Q4H    levothyroxine  137 mcg Oral Before breakfast    midodrine  10 mg Oral TID    moxifloxacin  400 mg Oral Daily    mycophenolate  250 mg Oral BID    polyethylene glycol  17 g Oral BID    predniSONE  5 mg Oral Daily    QUEtiapine  50 mg Oral QHS    tacrolimus  4 mg Oral BID     Continuous Infusions:   PRN " Meds:sodium chloride 0.9%, sodium chloride 0.9%, albuterol-ipratropium 2.5mg-0.5mg/3mL, ALPRAZolam, benzonatate, calcium carbonate, dextrose 50%, dextrose 50%, glucagon (human recombinant), glucose, glucose, heparin (porcine), insulin aspart U-100, midodrine, ondansetron, oxyCODONE, promethazine (PHENERGAN) IVPB  Anticoagulants/Antiplatelets: aspirin - held > 5days  Heparin 5000 u SQ    Allergies:   Review of patient's allergies indicates:   Allergen Reactions    No known drug allergies      Sedation Hx: have not been any systemic reactions    Labs:  No results for input(s): INR in the last 168 hours.    Invalid input(s):  PT,  PTT    Recent Labs  Lab 05/15/18  0400   WBC 7.60   HGB 8.7*   HCT 27.8*   MCV 95         Recent Labs  Lab 05/15/18  0400      *   K 3.2*   CL 93*   CO2 24   BUN 28*   CREATININE 2.6*   CALCIUM 8.5*   MG 1.8   ALT 7*   AST 10   ALBUMIN 2.3*   BILITOT 0.4         Vitals:  Temp: 97.7 °F (36.5 °C) (05/15/18 1106)  Pulse: (!) 111 (05/15/18 1505)  Resp: 18 (05/15/18 1505)  BP: 131/78 (05/15/18 1505)  SpO2: 100 % (05/15/18 1505)     Physical Exam:      General: no acute distress  Mental Status: alert and oriented to person, place and time  HEENT: normocephalic, atraumatic  Chest: unlabored breathing  Abdomen: nondistended  Extremity: moves all extremities    Plan: Paracentesis   Sedation Plan: Local    Ankur Coello M.D.  Radiology, PGY-V  [phone]

## 2018-05-15 NOTE — PROGRESS NOTES
[facility]  Heart Transplant  Progress Note    Patient Name: Lv Crocker  MRN: [id number]  Admission Date: 3/11/2018  Hospital Length of Stay: 64 days  Attending Physician: Jose A Lloyd MD  Primary Care Provider: Philippe Mohr MD  Principal Problem:Acute respiratory failure with hypoxia    Subjective:     Interval History:     Doing well this morning. Nausea has dissipated with Compazine. Had 3L UF from yesterday and paracentesis scheduled for today. Isolated leukocytosis yesterday possibly a lab error going with this morning labs. Tacro 15.9 from 9.8 yesterday. Denies fever/chill. Declined for in patient rehab currently    Continuous Infusions:  Scheduled Meds:   acetaminophen  650 mg Oral TID    cetirizine  5 mg Oral Daily    docusate sodium  100 mg Oral BID    epoetin jose miguel (PROCRIT) injection  100 Units/kg (Dosing Weight) Intravenous Every Mon, Wed, Fri    escitalopram oxalate  20 mg Oral Daily    famotidine  20 mg Oral QHS    guaiFENesin  1,200 mg Oral BID    heparin (porcine)  5,000 Units Subcutaneous Q12H    insulin aspart U-100  4-8 Units Subcutaneous TIDWM    insulin detemir U-100  9 Units Subcutaneous BID    levalbuterol  0.63 mg Nebulization Q4H    levothyroxine  137 mcg Oral Before breakfast    midodrine  10 mg Oral TID    moxifloxacin  400 mg Oral Daily    mycophenolate  250 mg Oral BID    polyethylene glycol  17 g Oral BID    predniSONE  5 mg Oral Daily    QUEtiapine  50 mg Oral QHS    tacrolimus  4 mg Oral BID     PRN Meds:sodium chloride 0.9%, sodium chloride 0.9%, albuterol-ipratropium 2.5mg-0.5mg/3mL, ALPRAZolam, benzonatate, calcium carbonate, dextrose 50%, dextrose 50%, glucagon (human recombinant), glucose, glucose, heparin (porcine), insulin aspart U-100, midodrine, ondansetron, oxyCODONE, promethazine (PHENERGAN) IVPB    Review of patient's allergies indicates:   Allergen Reactions    No known drug allergies      Objective:     Vital Signs  (Most Recent):  Temp: 97.7 °F (36.5 °C) (05/15/18 1106)  Pulse: 103 (05/15/18 1200)  Resp: 15 (05/15/18 1106)  BP: 116/69 (05/15/18 1106)  SpO2: 100 % (05/15/18 1106) Vital Signs (24h Range):  Temp:  [96.8 °F (36 °C)-98.2 °F (36.8 °C)] 97.7 °F (36.5 °C)  Pulse:  [103-120] 103  Resp:  [15-22] 15  SpO2:  [97 %-100 %] 100 %  BP: (110-130)/(69-84) 116/69     No data found.    Body mass index is 27.31 kg/m².      Intake/Output Summary (Last 24 hours) at 05/15/18 1238  Last data filed at 05/14/18 2300   Gross per 24 hour   Intake              320 ml   Output                0 ml   Net              320 ml       Hemodynamic Parameters:       Telemetry:     Physical Exam    Constitutional: He is oriented to person, place, and time.   Neck: No JVD present.   Decannulated trach. Dressing in place   Cardiovascular: Regular rhythm and normal heart sounds.    Tachycardia noted.   Pulmonary/Chest: Effort normal and breath sounds normal.   Abdominal: Soft. Bowel sounds are normal.   Musculoskeletal: He exhibits no edema or tenderness.   Neurological: He is alert and oriented to person, place, and time.   Skin: Skin is warm and dry.   Psychiatric: better mood and affect this morning. More engaging  Nursing note and vitals reviewed.    Significant Labs:  CBC:    Recent Labs  Lab 05/13/18  0600 05/14/18  0600 05/15/18  0400   WBC 6.46 16.31* 7.60   RBC 2.84* 3.15* 2.93*   HGB 8.4* 9.3* 8.7*   HCT 27.8* 30.2* 27.8*    502* 275   MCV 98 96 95   MCH 29.6 29.5 29.7   MCHC 30.2* 30.8* 31.3*     BNP:  No results for input(s): BNP in the last 168 hours.    Invalid input(s): BNPTRIAGELBLO  CMP:    Recent Labs  Lab 05/13/18  0600 05/14/18  0600 05/15/18  0400   * 297* 102   CALCIUM 9.1 9.3 8.5*   ALBUMIN 2.2* 2.4* 2.3*   PROT 6.1 6.2 5.9*   * 130* 130*   K 4.6 4.5 3.2*   CO2 22* 19* 24   CL 99 95 93*   BUN 31* 43* 28*   CREATININE 3.0* 3.4* 2.6*   ALKPHOS 142* 142* 132   ALT 9* 9* 7*   AST 10 11 10   BILITOT 0.4 0.4 0.4       Coagulation:   No results for input(s): PT, INR, APTT in the last 168 hours.  LDH:  No results for input(s): LDH in the last 72 hours.  Microbiology:  Microbiology Results (last 7 days)     Procedure Component Value Units Date/Time    Culture, Anaerobe [[id number]] Collected:  05/10/18 1411    Order Status:  Completed Specimen:  Ascites from Ascites Updated:  05/14/18 1335     Anaerobic Culture Culture in progress    Aerobic culture [[id number]] Collected:  05/10/18 1411    Order Status:  Completed Specimen:  Ascites from Ascites Updated:  05/14/18 0916     Aerobic Bacterial Culture No growth    Blood culture [[id number]] Collected:  05/05/18 0644    Order Status:  Completed Specimen:  Blood from Midline, Basilic, Right Updated:  05/10/18 1012     Blood Culture, Routine No growth after 5 days.    Blood culture [[id number]] Collected:  05/05/18 0812    Order Status:  Completed Specimen:  Blood Updated:  05/10/18 1012     Blood Culture, Routine No growth after 5 days.    Narrative:       Collection has been rescheduled by CDP at 5/5/2018 07:40 Reason: Due   now  Collection has been rescheduled by CDP at 5/5/2018 07:40 Reason: Due   now    AFB Culture & Smear [[id number]] Collected:  03/28/18 1607    Order Status:  Completed Specimen:  Body Fluid from Lung, RLL Updated:  05/10/18 0927     AFB Culture & Smear Culture in progress     AFB Culture & Smear Culture in progress     AFB CULTURE STAIN No acid fast bacilli seen.          I have reviewed all pertinent labs within the past 24 hours.    Estimated Creatinine Clearance: 33.9 mL/min (A) (based on SCr of 2.6 mg/dL (H)).    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 Most recently has had ongoing issues with his lungs, had gotten 2 thoracenteses, after  which he underwent VATS 01/19/18 followed by pleurex catheter placement and effusion drainage 01/11/18, subsequently had it removed 02/07/18 after drainage had decreased despite multiple attempts to drain it. Has been having significant coughing fits that result in him being near-syncopal at times, although difficult to say if he has passed out or not- patient most recently was admitted to the hospital for increased AGUILAR, coughing and pre-syncope 02/11-02/14/18 at [facility].   Patient was started on antibiotics for suspected bacterial infection, with some diarrhea, bronchitis, and possible pneumonia. Ct chest showed some pleural fluid collection and after talking with Dr. James, we arranged for him to receive a diagnostic tap with IR, from which the fluid collection demonstrated no growth or significant findings at all really. Cell counts do not appear to have been sent but will recheck. Patient states since his hospital stay, yesterday had a significant coughing fit again, after which he nearly passed out, is being seen in clinic today for this. Denies any cardiopulmonary complaints, no leg swelling, has some abdominal swelling, which is moderate for him. Denies significant shortness of breath with mild exertion, had 6MWT yesterday to see if he qualified for home O2, and his O2 sats actually improved after recovery from where he started initially. He and his wife admit he is in bed most days, not very active.     Mr. Crocker presented to the ED 3/11/18 with approximately 3 weeks of worsening diarrhea and 2-3 days of sinus pressure, drainage, and worsened cough.  He notes that he had a coughing fit last night and passed out, coughed throughout most of the night.  This also happened on 2/25/18.  He last saw Dr Ferreira in clinic on 2/20/18 where he was taken off myfortic and switched to cellcept (he hadn't been on cellcept because of leukopenia when they tried post-transplant).  Though his diarrhea had improved after his  last discharge, he states it has increased now to 15x/day, clear/yellow/green, no fevers, no exacerbating foods per say.  He was taken back off the cellcept and placed back on myfortic this past week and has not noticed immediate change in diarrhea. He also reports his baseline coughing fits havent improved and are minimally responsive to tessalon pearls.  Came on last night, he lost consciousness during a fit once which is relatively normal for him, and had two more during HPI.  He notes no sick contacts but does state he's had some URI symptoms as above.  His baseline vitals are usually -120 and BP 90s/60s-110s/70s.  He reports a history of intermittent diarrhea - did have Cdiff many years ago but this smells different.  No abdominal pain, no nausea, no vomiting.  No blood in diarrhea.  Appears last c-scope in 2015 with no evidence of infection or inflammatory processes.  He does report IBS which is different that this.       * Acute respiratory failure with hypoxia    - Resolved. Now with trach decannulation  - Etiology = RSV   - Stoma care / education provided by pulmonology   - Continue pulmonary toilet to help with respiratory mechanics   - Appreciate PT/OT/Wound care.  - Plan is to finish 7 day course of moxifloxacin   - D/C pulse steroids on 5/14. Will discuss with pharmacy if current 5mg is necessary for OH or can be stopped.  - Continue to wean HFNC to RA and pulmonary toilet  - repeat CXR shows improved aeration  - HD with UF of 3Liters removal        Heart transplanted    - TTE 4/24/18 showed normal graft function but has known history of restrictive CM post transplant.  - Continue Prednisone 5mg daily and Cellcept 250mg BID   - Tacro level 15.9 today from9.8 yesterday. Possibly from delayed dose due to nausea yesterday  - changeTacrolimus 4/5 per pharmacy starting from this evening. Goal is 6-10        Other ascites    - planned for paracentesis today  - Prior samples negative for infection  -  will await for cultures and GS results today        Leukocytosis    - back to baseline today. Possibly a lab error yesterday                  Alteration in skin integrity    - Dry gangrene on toes. No surgical intervention indicated.   - Wound care following. Local care only at this point.         ESRD (end stage renal disease)    - Appreciate Nephrology recs.   - Continue midodrine  - HD per nephrology SAVANNA W F.         Restrictive cardiomyopathy    - Requested more fluid removal with dialysis         Type 1 diabetes mellitus with stage 3 chronic kidney disease    - Appreciate Endocrine's recs  - continue basal bolus with uptitration as needed   - BS have been high due to steroid pulse   - possibly source of persistent Nausea from gastroparesis        Hypothyroidism due to Hashimoto's thyroiditis    - Appreciate Endocrine's recs  - Switched to PO Levothyroxine 137mcg daily        Anemia    - Nephrology is managing ProCrit   - Hgb stable         Debility    - PT/OT/SLP daily. Recommending rehab.  - Nutrition following. Regular diet with thin liquids  - Passed MBSS with speech.  - awaiting placement to rehab.  on plan          Anxiety    - Increased Lexapro to 20mg daily  - Continue seroquel 50 QHS   - Continue .5mg xanax PRN Q8H   - Appreciate psychiatry input.           Discussed plan of care with Dr. Gabino Walsh MD  Heart Transplant  [facility]

## 2018-05-15 NOTE — PLAN OF CARE
Problem: Occupational Therapy Goal  Goal: Occupational Therapy Goal  Goals to be met by: 5/8/18 goal extended to be met by: 5/22/18    Pt will follow 75% of motor commands with <2 verbal cues for repetition of task to maximize engagement in grooming task.--MET  Pt will complete feeding with mod A.   Pt will complete with HOB slightly elevated to seated at EOB with mod A in prep for seated grooming task.  Pt will engage in BUE exercises in orders to increase strength to 3+/5 in BUE's to increase engagement in seated grooming task  Pt will sit at EOB for approx 10 minutes with mod A and unilateral UE support to complete grooming task - Progressing   Pt will complete sit>stand from EOB with bed in lowest position with mod A in prep for [place] [facility]       Outcome: Ongoing (interventions implemented as appropriate)  All goals remain appropriate   Continue POC     SANDOR Saldaña/KING  [phone]  5/15/2018

## 2018-05-15 NOTE — NURSING
Rounds Report: Attended interdisciplinary rounds this afaternoon with the transplant team including SW, physicians, fellows,  mid-level providers, and transplant coordinators.  Discussed plan of care, which includes paracentesis today. Still needs placemend to Rehab facility or SNF/Nursing home depending on who will offer dialysis.

## 2018-05-15 NOTE — PROGRESS NOTES
Paracentesis complete, 3500 MLs removed. Specimen sent to lab. Dressing applied to LLQ puncture site, dressing clean dry and intact. Report called to inpatient nurse SANFORD Awan. Patient in for transport.

## 2018-05-15 NOTE — ASSESSMENT & PLAN NOTE
- planned for paracentesis today  - Prior samples negative for infection  - will await for cultures and GS results today

## 2018-05-15 NOTE — SUBJECTIVE & OBJECTIVE
Interval History:     Doing well this morning. Nausea has dissipated with Compazine. Had 3L UF from yesterday and paracentesis scheduled for today. Isolated leukocytosis yesterday possibly a lab error going with this morning labs. [place] 15.9 from 9.8 yesterday. Denies fever/chill.    Continuous Infusions:  Scheduled Meds:   acetaminophen  650 mg Oral TID    cetirizine  5 mg Oral Daily    docusate sodium  100 mg Oral BID    epoetin jose miguel (PROCRIT) injection  100 Units/kg (Dosing Weight) Intravenous Every Mon, Wed, Fri    escitalopram oxalate  20 mg Oral Daily    famotidine  20 mg Oral QHS    guaiFENesin  1,200 mg Oral BID    heparin (porcine)  5,000 Units Subcutaneous Q12H    insulin aspart U-100  4-8 Units Subcutaneous TIDWM    insulin detemir U-100  9 Units Subcutaneous BID    levalbuterol  0.63 mg Nebulization Q4H    levothyroxine  137 mcg Oral Before breakfast    midodrine  10 mg Oral TID    moxifloxacin  400 mg Oral Daily    mycophenolate  250 mg Oral BID    polyethylene glycol  17 g Oral BID    predniSONE  5 mg Oral Daily    QUEtiapine  50 mg Oral QHS    tacrolimus  4 mg Oral BID     PRN Meds:sodium chloride 0.9%, sodium chloride 0.9%, albuterol-ipratropium 2.5mg-0.5mg/3mL, ALPRAZolam, benzonatate, calcium carbonate, dextrose 50%, dextrose 50%, glucagon (human recombinant), glucose, glucose, heparin (porcine), insulin aspart U-100, midodrine, ondansetron, oxyCODONE, promethazine (PHENERGAN) IVPB    Review of patient's allergies indicates:   Allergen Reactions    No known drug allergies      Objective:     Vital Signs (Most Recent):  Temp: 97.7 °F (36.5 °C) (05/15/18 1106)  Pulse: 103 (05/15/18 1200)  Resp: 15 (05/15/18 1106)  BP: 116/69 (05/15/18 1106)  SpO2: 100 % (05/15/18 1106) Vital Signs (24h Range):  Temp:  [96.8 °F (36 °C)-98.2 °F (36.8 °C)] 97.7 °F (36.5 °C)  Pulse:  [103-120] 103  Resp:  [15-22] 15  SpO2:  [97 %-100 %] 100 %  BP: (110-130)/(69-84) 116/69     No data found.    Body  mass index is 27.31 kg/m².      Intake/Output Summary (Last 24 hours) at 05/15/18 1238  Last data filed at 05/14/18 2300   Gross per 24 hour   Intake              320 ml   Output                0 ml   Net              320 ml       Hemodynamic Parameters:       Telemetry:     Physical Exam    Constitutional: He is oriented to person, place, and time.   Neck: No JVD present.   Decannulated trach. Dressing in place   Cardiovascular: Regular rhythm and normal heart sounds.    Tachycardia noted.   Pulmonary/Chest: Effort normal and breath sounds normal.   Abdominal: Soft. Bowel sounds are normal.   Musculoskeletal: He exhibits no edema or tenderness.   Neurological: He is alert and oriented to person, place, and time.   Skin: Skin is warm and dry.   Psychiatric: better mood and affect this morning. More engaging  Nursing note and vitals reviewed.    Significant Labs:  CBC:    Recent Labs  Lab 05/13/18  0600 05/14/18  0600 05/15/18  0400   WBC 6.46 16.31* 7.60   RBC 2.84* 3.15* 2.93*   HGB 8.4* 9.3* 8.7*   HCT 27.8* 30.2* 27.8*    502* 275   MCV 98 96 95   MCH 29.6 29.5 29.7   MCHC 30.2* 30.8* 31.3*     BNP:  No results for input(s): BNP in the last 168 hours.    Invalid input(s): BNPTRIAGELBLO  CMP:    Recent Labs  Lab 05/13/18  0600 05/14/18  0600 05/15/18  0400   * 297* 102   CALCIUM 9.1 9.3 8.5*   ALBUMIN 2.2* 2.4* 2.3*   PROT 6.1 6.2 5.9*   * 130* 130*   K 4.6 4.5 3.2*   CO2 22* 19* 24   CL 99 95 93*   BUN 31* 43* 28*   CREATININE 3.0* 3.4* 2.6*   ALKPHOS 142* 142* 132   ALT 9* 9* 7*   AST 10 11 10   BILITOT 0.4 0.4 0.4      Coagulation:   No results for input(s): PT, INR, APTT in the last 168 hours.  LDH:  No results for input(s): LDH in the last 72 hours.  Microbiology:  Microbiology Results (last 7 days)     Procedure Component Value Units Date/Time    Culture, Anaerobe [[id number]] Collected:  05/10/18 1411    Order Status:  Completed Specimen:  Ascites from Ascites Updated:  05/14/18 6877      Anaerobic Culture Culture in progress    Aerobic culture [[id number]] Collected:  05/10/18 1411    Order Status:  Completed Specimen:  Ascites from Ascites Updated:  05/14/18 0916     Aerobic Bacterial Culture No growth    Blood culture [[id number]] Collected:  05/05/18 0644    Order Status:  Completed Specimen:  Blood from Midline, Basilic, Right Updated:  05/10/18 1012     Blood Culture, Routine No growth after 5 days.    Blood culture [[id number]] Collected:  05/05/18 0812    Order Status:  Completed Specimen:  Blood Updated:  05/10/18 1012     Blood Culture, Routine No growth after 5 days.    Narrative:       Collection has been rescheduled by CDP at 5/5/2018 07:40 Reason: Due   now  Collection has been rescheduled by CDP at 5/5/2018 07:40 Reason: Due   now    AFB Culture & Smear [[id number]] Collected:  03/28/18 1607    Order Status:  Completed Specimen:  Body Fluid from Lung, RLL Updated:  05/10/18 0927     AFB Culture & Smear Culture in progress     AFB Culture & Smear Culture in progress     AFB CULTURE STAIN No acid fast bacilli seen.          I have reviewed all pertinent labs within the past 24 hours.    Estimated Creatinine Clearance: 33.9 mL/min (A) (based on SCr of 2.6 mg/dL (H)).    Diagnostic Results:

## 2018-05-15 NOTE — ASSESSMENT & PLAN NOTE
BG goal 140-180    Episode of hypoglycemia overnight.  Levemir 20u with no PO intake.    Recommend decreasing levemir dose as well as splitting it to BID for better coverage in T1DM  Recommend Levemir 9u BID  Given recent nausea, okay to give 4-8u Novolog according to PO% completion.  Continue low dose correction insulin.  BG monitoring ac/hs

## 2018-05-15 NOTE — PROCEDURES
Radiology Post-Procedure Note    Pre Op Diagnosis: Ascites  Post Op Diagnosis: Same    Procedure: Paracentesis    Procedure performed by: Ron Wall MD.     Written Informed Consent Obtained: Yes  Specimen Removed: YES 20 cc  Estimated Blood Loss: Minimal    Findings:   Successful paracentesis.  Albumin administered PRN per protocol.    Patient tolerated procedure well.    Ankur Coello M.D.  Radiology, PGY-V  [phone]

## 2018-05-15 NOTE — PROGRESS NOTES
[facility]  Endocrinology  Progress Note    Admit Date: 3/11/2018     Reason for Consult: abnormal TFTs    Surgical Procedure and Date: s/p heart transplant 2014     Diabetes diagnosis year: 5yo (T1DM)     Home Diabetes Medications:    Levemir 15u qHS  Novolog 4u AC     How often checking glucose at home? 4 times daily   BG readings on regimen: 150-200s  Hypoglycemia on the regimen?  Yes, rarely - treats appropriately (light snack, glucose tab)  Missed doses on regimen?  No        Diabetes Complications include:     Hyperglycemia, Hypoglycemia  and Diabetic chronic kidney disease          Complicating diabetes co morbidities:   N/A     HPI:   Patient is a 42 y.o. male with a diagnosis of T1DM, HTN, hypothyroidism, s/p heart transplant.  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 He required serial paracentesis and thoracentesis until he underwent a VATS with pleurX catheter placement on 1/11/2018 and removed 2/7/18.       Presented to hospital secondary to diarrhea and cough.  Upon initial labs, TSH was decreased (0.101) and free T4 1.33.  Endocrinology consulted to assist in management of these labs.  He was last seen in clinic by Kamala Vázquez NP 11/2017.   Previous hospitalization, endocrine consulted for same problem.  During that visit, recommended decreasing LT4 to 125mcg daily. Unfortunately, patient was discharged on previous dose of 150mcg daily.     Interval HPI:   Overnight events:   AF, tachycardic, otherwise VSS.    Patient was away at HD yesterday, with BG at goal.  However, overnight patient experienced hypoglycemia into the 50s.   Received Dex 25g.  Subsequent normal BG.    Reports nausea overnight with minimal PO intake yesterday.    Received Lev 20, and 1u SSI.    /84 (BP Location: Left arm, Patient Position: Lying)   Pulse (!) 115   Temp 97.4 °F (36.3 °C) (Oral)   " Resp 18   Ht 5' 7" (1.702 m)   Wt 79.1 kg (174 lb 6.1 oz)   SpO2 99%   BMI 27.31 kg/m²       Labs Reviewed and Include      Recent Labs  Lab 05/15/18  0400      CALCIUM 8.5*   ALBUMIN 2.3*   PROT 5.9*   *   K 3.2*   CO2 24   CL 93*   BUN 28*   CREATININE 2.6*   ALKPHOS 132   ALT 7*   AST 10   BILITOT 0.4     Lab Results   Component Value Date    WBC 7.60 05/15/2018    HGB 8.7 (L) 05/15/2018    HCT 27.8 (L) 05/15/2018    MCV 95 05/15/2018     05/15/2018     No results for input(s): TSH, FREET4 in the last 168 hours.  Lab Results   Component Value Date    HGBA1C 5.1 04/05/2018       Nutritional status:   Body mass index is 27.31 kg/m².  Lab Results   Component Value Date    ALBUMIN 2.3 (L) 05/15/2018    ALBUMIN 2.4 (L) 05/14/2018    ALBUMIN 2.2 (L) 05/13/2018     Lab Results   Component Value Date    PREALBUMIN 13 (L) 04/08/2018    PREALBUMIN 5 (L) 03/15/2018    PREALBUMIN 18 (L) 03/24/2015       Estimated Creatinine Clearance: 33.9 mL/min (A) (based on SCr of 2.6 mg/dL (H)).    Accu-Checks  Recent Labs      05/12/18   2107  05/13/18   0753  05/13/18   1205  05/13/18   2140  05/14/18   0624  05/14/18   1111  05/14/18   1841  05/14/18   2158  05/14/18   2222  05/15/18   0859   POCTGLUCOSE  285*  316*  259*  251*  289*  153*  73  53*  180*  126*       Current Medications and/or Treatments Impacting Glycemic Control  Immunotherapy:  Immunosuppressants         Stop Route Frequency     tacrolimus capsule 5 mg      -- Oral 2 times daily     mycophenolate capsule 250 mg      -- Oral 2 times daily        Steroids:   Hormones     Start     Stop Route Frequency Ordered    05/13/18 1400  predniSONE tablet 5 mg      -- Oral Daily 05/13/18 1357        Pressors:    Autonomic Drugs     Start     Stop Route Frequency Ordered    04/30/18 0705  midodrine tablet 15 mg      -- Oral As needed (PRN) 04/30/18 0707    04/25/18 0900  midodrine tablet 10 mg      -- Oral 3 times daily 04/25/18 0650    "     Hyperglycemia/Diabetes Medications: Antihyperglycemics     Start     Stop Route Frequency Ordered    05/15/18 1130  insulin aspart U-100 pen 4-8 Units      -- SubQ 3 times daily with meals 05/15/18 0911    05/15/18 0915  insulin detemir U-100 pen 10 Units      -- SubQ 2 times daily 05/15/18 0911    05/10/18 1044  insulin aspart U-100 pen 0-5 Units      -- SubQ Before meals & nightly PRN 05/10/18 0945          ASSESSMENT and PLAN    Type 1 diabetes mellitus with stage 3 chronic kidney disease    BG goal 140-180    Episode of hypoglycemia overnight.  Levemir 20u with no PO intake.    Recommend decreasing levemir dose as well as splitting it to BID for better coverage in T1DM  Recommend Levemir 9u BID  Given recent nausea, okay to give 4-8u Novolog according to PO% completion.  Continue low dose correction insulin.  BG monitoring ac/hs              Current chronic use of systemic steroids    Can increase insulin requirement        ESRD (end stage renal disease)    Titrate cautiously.  Caution with insulin stacking.  Avoid hypoglycemia.        Hypothyroidism due to Hashimoto's thyroiditis    Continue LT4 137 mcg/day   Recommend rechecking tsh in 4-6 weeks (~6/2/18)        Heart transplanted    Per transplant  Avoid hypoglycemia  On PDN and prograf - could lead to prandial elevations and insulin resistance.            Deonna Larry MD  Endocrinology  [facility]

## 2018-05-15 NOTE — CONSULTS
PM&R consult follow up.  Please see original consult for detailed note.      Inpatient rehab admission was denied by patient's insurance.  Will sign off.    VAISHALI Caldwell, FNP-C  Physical Medicine & Rehabilitation   05/15/2018  Spectralink: [phone]

## 2018-05-16 NOTE — ASSESSMENT & PLAN NOTE
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 No surgical intervention indicated.   - Wound care following. Local care only at this point.   - podiatry continuing to fu during stay

## 2018-05-16 NOTE — SUBJECTIVE & OBJECTIVE
Interval History:     Had HD today. Report mild nausea after treatment without emesis. Tacrolimus elevated to 14.9 today and dosing deescalated to 3/3 starting this evening. Requires SNF that can do HD/PT/OT and In patient rehab due to prolonged hospitalization associated debility.     Continuous Infusions:  Scheduled Meds:   acetaminophen  650 mg Oral TID    cetirizine  5 mg Oral Daily    docusate sodium  100 mg Oral BID    epoetin jose miguel (PROCRIT) injection  100 Units/kg (Dosing Weight) Intravenous Every Mon, Wed, Fri    escitalopram oxalate  20 mg Oral Daily    famotidine  20 mg Oral QHS    guaiFENesin  1,200 mg Oral BID    heparin (porcine)  5,000 Units Subcutaneous Q12H    insulin aspart U-100  4-8 Units Subcutaneous TIDWM    insulin detemir U-100  8 Units Subcutaneous BID    levalbuterol  0.63 mg Nebulization Q4H    levothyroxine  137 mcg Oral Before breakfast    midodrine  10 mg Oral TID    mycophenolate  250 mg Oral BID    polyethylene glycol  17 g Oral BID    predniSONE  5 mg Oral Daily    QUEtiapine  50 mg Oral QHS    tacrolimus  3 mg Oral BID     PRN Meds:sodium chloride 0.9%, sodium chloride 0.9%, sodium chloride 0.9%, albuterol-ipratropium 2.5mg-0.5mg/3mL, ALPRAZolam, benzonatate, calcium carbonate, dextrose 50%, dextrose 50%, glucagon (human recombinant), glucose, glucose, heparin (porcine), insulin aspart U-100, midodrine, ondansetron, oxyCODONE, promethazine (PHENERGAN) IVPB    Review of patient's allergies indicates:   Allergen Reactions    No known drug allergies      Objective:     Vital Signs (Most Recent):  Temp: 97.4 °F (36.3 °C) (05/16/18 1139)  Pulse: (!) 121 (05/16/18 1251)  Resp: 18 (05/16/18 1251)  BP: 119/71 (05/16/18 1147)  SpO2: 99 % (05/16/18 1251) Vital Signs (24h Range):  Temp:  [97.4 °F (36.3 °C)-98.9 °F (37.2 °C)] 97.4 °F (36.3 °C)  Pulse:  [] 121  Resp:  [16-24] 18  SpO2:  [10 %-100 %] 99 %  BP: (110-144)/(70-91) 119/71     No data found.    Body mass index  is 27.31 kg/m².      Intake/Output Summary (Last 24 hours) at 05/16/18 1406  Last data filed at 05/16/18 0500   Gross per 24 hour   Intake              900 ml   Output             3500 ml   Net            -2600 ml       Hemodynamic Parameters:       Telemetry:     Physical Exam  Constitutional: He is oriented to person, place, and time.   Neck: No JVD present.   Decannulated trach. Dressing in place   Cardiovascular: Regular rhythm and normal heart sounds.    Tachycardia noted.   Pulmonary/Chest: Effort normal and breath sounds normal.   Abdominal: Soft. Bowel sounds are normal.   Musculoskeletal: He exhibits no edema or tenderness.   Neurological: He is alert and oriented to person, place, and time.   Skin: Skin is warm and dry.   Psychiatric: better mood and affect this morning. More engaging  Nursing note and vitals reviewed.    Significant Labs:  CBC:    Recent Labs  Lab 05/14/18  0600 05/15/18  0400 05/16/18  0400   WBC 16.31* 7.60 4.33   RBC 3.15* 2.93* 2.98*   HGB 9.3* 8.7* 8.6*   HCT 30.2* 27.8* 28.4*   * 275 214   MCV 96 95 95   MCH 29.5 29.7 28.9   MCHC 30.8* 31.3* 30.3*     BNP:  No results for input(s): BNP in the last 168 hours.    Invalid input(s): BNPTRIAGELBLO  CMP:    Recent Labs  Lab 05/14/18  0600 05/15/18  0400 05/16/18  0623   * 102 80   CALCIUM 9.3 8.5* 8.7   ALBUMIN 2.4* 2.3* 2.4*   PROT 6.2 5.9* 5.8*   * 130* 131*   K 4.5 3.2* 4.0   CO2 19* 24 24   CL 95 93* 93*   BUN 43* 28* 37*   CREATININE 3.4* 2.6* 3.5*   ALKPHOS 142* 132 126   ALT 9* 7* 6*   AST 11 10 11   BILITOT 0.4 0.4 0.4      Coagulation:   No results for input(s): PT, INR, APTT in the last 168 hours.  LDH:  No results for input(s): LDH in the last 72 hours.  Microbiology:  Microbiology Results (last 7 days)     Procedure Component Value Units Date/Time    Culture, Anaerobe [[id number]] Collected:  05/15/18 1518    Order Status:  Completed Specimen:  Ascites from Ascites Updated:  05/16/18 0733     Anaerobic  Culture Culture in progress    Aerobic culture [[id number]] Collected:  05/15/18 1518    Order Status:  Completed Specimen:  Ascites from Ascites Updated:  05/16/18 0713     Aerobic Bacterial Culture No growth    Gram stain [[id number]] Collected:  05/15/18 1518    Order Status:  Completed Specimen:  Ascites from Ascites Updated:  05/15/18 2004     Gram Stain Result No WBC's      No organisms seen    Culture, Anaerobe [[id number]] Collected:  05/10/18 1411    Order Status:  Completed Specimen:  Ascites from Ascites Updated:  05/14/18 1335     Anaerobic Culture Culture in progress    Aerobic culture [[id number]] Collected:  05/10/18 1411    Order Status:  Completed Specimen:  Ascites from Ascites Updated:  05/14/18 0916     Aerobic Bacterial Culture No growth    Blood culture [[id number]] Collected:  05/05/18 0644    Order Status:  Completed Specimen:  Blood from Midline, Basilic, Right Updated:  05/10/18 1012     Blood Culture, Routine No growth after 5 days.    Blood culture [[id number]] Collected:  05/05/18 0812    Order Status:  Completed Specimen:  Blood Updated:  05/10/18 1012     Blood Culture, Routine No growth after 5 days.    Narrative:       Collection has been rescheduled by CDP at 5/5/2018 07:40 Reason: Due   now  Collection has been rescheduled by CDP at 5/5/2018 07:40 Reason: Due   now    AFB Culture & Smear [[id number]] Collected:  03/28/18 1607    Order Status:  Completed Specimen:  Body Fluid from Lung, RLL Updated:  05/10/18 0927     AFB Culture & Smear Culture in progress     AFB Culture & Smear Culture in progress     AFB CULTURE STAIN No acid fast bacilli seen.          I have reviewed all pertinent labs within the past 24 hours.    Estimated Creatinine Clearance: 25.2 mL/min (A) (based on SCr of 3.5 mg/dL (H)).    Diagnostic Results:  I have reviewed and interpreted all pertinent imaging results/findings within the past 24 hours.

## 2018-05-16 NOTE — PROGRESS NOTES
UPDATE    SW confirmed [facility] is unable to accept pt because pt is debilitated and unable to dialyze in a chair. Per [facility]US Leblanc, [facility] also denied pt due to debility. Pt cannot discharge to inpt SNF, as recommended by pt's insurance, because SNF does not offer onsite dialysis.     GUS confirmed [facility] is able to provide dialysis onsite and does not require pt's acceptance at an outside dialysis clinic. GUS confirmed  is aware and is working with insurance company toward goal of transferring pt to rehab as soon as possible.    GUS attempted to update pt, however pt is not in his room at this time. Pt called pt's wife and left message. SW providing psychosocial and counseling support, education, resources, and d/c planning as needed. SW continuing to follow and remains available.

## 2018-05-16 NOTE — PLAN OF CARE
Patient was denied transfer to [facility] facility.   Appeal process started a@ 9am 5/16/18  Faxed latest PT/OT/MD Progress notes/SW notes to appeal department at [facility] [place].     Will follow up tomorrow

## 2018-05-16 NOTE — SUBJECTIVE & OBJECTIVE
"Interval Hx: Patient seen at bedside.  Complains of nausea and chills, saying he can't get warm.  Patient complains with pain in both calves.  No other pedal complaints.    Scheduled Meds:   acetaminophen  650 mg Oral TID    cetirizine  5 mg Oral Daily    docusate sodium  100 mg Oral BID    epoetin jose miguel (PROCRIT) injection  100 Units/kg (Dosing Weight) Intravenous Every Mon, Wed, Fri    escitalopram oxalate  20 mg Oral Daily    famotidine  20 mg Oral QHS    guaiFENesin  1,200 mg Oral BID    heparin (porcine)  5,000 Units Subcutaneous Q12H    insulin aspart U-100  4-8 Units Subcutaneous TIDWM    insulin detemir U-100  8 Units Subcutaneous BID    levalbuterol  0.63 mg Nebulization Q4H    levothyroxine  137 mcg Oral Before breakfast    midodrine  10 mg Oral TID    mycophenolate  250 mg Oral BID    polyethylene glycol  17 g Oral BID    predniSONE  5 mg Oral Daily    QUEtiapine  50 mg Oral QHS    tacrolimus  3 mg Oral BID     Continuous Infusions:    PRN Meds:sodium chloride 0.9%, sodium chloride 0.9%, sodium chloride 0.9%, albuterol-ipratropium 2.5mg-0.5mg/3mL, ALPRAZolam, benzonatate, calcium carbonate, dextrose 50%, dextrose 50%, glucagon (human recombinant), glucose, glucose, heparin (porcine), insulin aspart U-100, midodrine, ondansetron, oxyCODONE, promethazine (PHENERGAN) IVPB    Review of patient's allergies indicates:   Allergen Reactions    No known drug allergies         Past Medical History:   Diagnosis Date    Anxiety     Arthritis     Ascites     Thought to be 2/2 heart tpx; liver bx 3/31/17 w/o significant fibrosis    Cardiomyopathy     Depression     Controlled "for the most part" on Lexipro    Encounter for blood transfusion     during transplant    GERD (gastroesophageal reflux disease)     Hashimoto's disease     History of CHF (congestive heart failure)     Previously EF 20% (prior to heart transplant); last EF 60%, PAP 41 as of 12/12/17; throught to be 2/2 genetics & DM1 "    History of coronary artery disease     H/o Coronary artery disease; resolved since heart transplant 2014    History of myocardial infarction     h/o MI x3 prior to heart transplant    HLD (hyperlipidemia) 6/11/2015    Hx of psychiatric care     Hypoglycemia unawareness in type 1 diabetes mellitus     Hypothyroid     Initially hyperthyroid 2/2 Hoshimoto's thyroiditis; now on levothyroxine 150 mcg qd    Pleural effusion due to another disorder     Thought to be 2/2 heart tpx; s/p PleuRx catheter placement and removal after 1-2 months    Psychiatric problem     Pulmonary hypertension assoc with unclear multi-factorial mechanisms 12/12/2017    PAP 41 (EF 60%) on ECHO 12/12/17    Syncope 6/30/2015    Type I diabetes mellitus, well controlled     Well controlled; Dx'd @7y/o; on N insulin 20U BID & Humalog 10U w/meals +SSI; Glu 89 11/9/17; A1c 7.2% 4/5/17    Unspecified essential hypertension 05/29/2014    Well controlled; Last /57 on 11/9/17     Past Surgical History:   Procedure Laterality Date    CARDIAC CATHETERIZATION      CARDIAC SURGERY      CHOLECYSTECTOMY      COLONOSCOPY N/A 3/13/2018    Procedure: COLONOSCOPY;  Surgeon: Tim Spencer MD;  Location: [facility] ([address] [place] [address]);  Service: Endoscopy;  Laterality: N/A;    CORONARY ANGIOPLASTY      FOOT SPLIT TRANSFER OF THE POSTERIOR TIBIALIS TENDON PROCEDURE      HEART TRANSPLANT  2014    KNEE SURGERY      PleuRx catheter placement  01/11/2018    Plan for removal after 1-2 months by Dr. James in cardiothoracic surgery    s/p oht  November 2014    stent placemnet         Family History     Problem Relation (Age of Onset)    Cancer Brother (50)    Diabetes Mother, Father, Brother, Son, Sister, Maternal Uncle, Paternal Aunt, Maternal Grandmother, Maternal Grandfather    Heart disease Mother, Brother    Hypertension Mother, Father, Brother    Kidney disease Mother, Father    Stroke Father, Brother    Vision loss Brother        Social  History Main Topics    Smoking status: Never Smoker    Smokeless tobacco: Never Used    Alcohol use No    Drug use: No    Sexual activity: Yes     Partners: Female     Review of Systems   Skin: Positive for color change, pallor and wound.     Objective:     Vital Signs (Most Recent):  Temp: 97.4 °F (36.3 °C) (05/16/18 1139)  Pulse: (!) 121 (05/16/18 1251)  Resp: 18 (05/16/18 1251)  BP: 119/71 (05/16/18 1147)  SpO2: 99 % (05/16/18 1251) Vital Signs (24h Range):  Temp:  [97.4 °F (36.3 °C)-98.9 °F (37.2 °C)] 97.4 °F (36.3 °C)  Pulse:  [] 121  Resp:  [16-24] 18  SpO2:  [10 %-100 %] 99 %  BP: (110-144)/(70-91) 119/71     Weight: 79.1 kg (174 lb 6.1 oz)  Body mass index is 27.31 kg/m².    Foot Exam    General  General Appearance: appears stated age and healthy   Affect: appropriate       Right Foot/Ankle     Inspection and Palpation  Tenderness: great toe metatarsophalangeal joint   Skin Exam: skin changes and abnormal color;     Neurovascular  Dorsalis pedis: 1+  Posterior tibial: 1+  Saphenous nerve sensation: diminished  Tibial nerve sensation: diminished  Superficial peroneal nerve sensation: diminished  Deep peroneal nerve sensation: diminished  Sural nerve sensation: diminished      Left Foot/Ankle      Inspection and Palpation  Tenderness: great toe interphalangeal joint and great toe metatarsophalangeal joint   Skin Exam: skin changes;     Neurovascular  Dorsalis pedis: 1+  Posterior tibial: 1+  Saphenous nerve sensation: diminished  Tibial nerve sensation: diminished  Superficial peroneal nerve sensation: diminished  Deep peroneal nerve sensation: diminished  Sural nerve sensation: diminished          Gangrene noted to hallux and 3rd toe. Stable no purulent drainage noted. Ischemic changes noted to right second toe. Pain upon palpation to right hallux and 3rd toe.     Superficial eschar on distal posterior leg.  No purulence, erythema, edema, or maloder                                Mild ischemic  changes noted to left hallux. CFT <3s. No purulent drainage noted.     Discoloration of distal posterior left leg.  No purulence, erythema, edema, or maloder      Left foot.                                   Laboratory:  CBC:     Recent Labs  Lab 05/16/18  0400   WBC 4.33   RBC 2.98*   HGB 8.6*   HCT 28.4*      MCV 95   MCH 28.9   MCHC 30.3*     CMP:     Recent Labs  Lab 05/16/18  0623   GLU 80   CALCIUM 8.7   ALBUMIN 2.4*   PROT 5.8*   *   K 4.0   CO2 24   CL 93*   BUN 37*   CREATININE 3.5*   ALKPHOS 126   ALT 6*   AST 11   BILITOT 0.4       Diagnostic Results:  Xray: Left: No fracture dislocation bone destruction seen.  There is mild DJD.  There are vascular changes suggesting diabetes.  There are spurs on the calcaneus.    Right: There is hardware consistent with talocalcaneal arthrodesis.  There are vascular calcification suggesting diabetes.  No fracture dislocation bone destruction seen.    Clinical Findings:  Dry stable gangrene noted to right hallux and 3rd toe. Ischemic changes noted to right 2nd and left hallux.

## 2018-05-16 NOTE — PROGRESS NOTES
Patient arrived on unit via bed. Unable to obtain patient weight. Dialysis tx initiated via left CVC. Ports aspirated and flushed without difficulty. Lines connected and secured. Orders and machine settings verified. Tx started. Good blood flows established.

## 2018-05-16 NOTE — PROGRESS NOTES
[facility]  Nephrology  Progress Note    Patient Name: Lv Crocker  MRN: [id number]  Admission Date: 3/11/2018  Hospital Length of Stay: 65 days  Attending Provider: Jose A Lloyd MD   Primary Care Physician: Philippe Mohr MD  Principal Problem:Acute respiratory failure with hypoxia    Subjective:     HPI: Mr. Crocker is a 43 yo WM with T1DM, Hashimoto thyroiditis, h/o OHTx 11/2014, and CKD stage 4 who was admitted on 3/11/18 for persistent diarrhea. He reported a 3 week history of diarrhea up to 15x/day. Associated symptoms including URI symptoms, coughing fits, and coughing syncope. Prograf level was 14.3. His post-heart transplant course has been complicated by AMR 4/2015, CMV, post-transplant restrictive cardiomyopathy, recurrent pleural effusions/ascites requiring monthly thoracenteses/paracenteses, VATS 1/11/18 with Pleur-X catheter (now removed), and CKD stage 4 with baseline sCr 2.6-3.0. Baseline -120s and baseline BP 90s-110s/60-70s. Upon admission he was started on IVF and diarrhea workup was initiated. On 3/12 he was noted to have decreased UOP despite fluids; NS was increased to 100cc/hr. He was scheduled for a colonoscopy on 3/13 however procedure was cancelled due to hypoxia and fever. He was transferred to the ICU for closer monitoring. He continued to have oliguria overnight. Bladder scan revealed 200cc of urine but patient refused osullivan catheter placement. Wife reports that patient always has a hard time urinating again after osullivan catheters are placed/removed so he refuses them. He remained hypoxic despite FiO2 70% and ABG revealed combined respiratory and metabolic acidosis with pH 7.17. He was started on BiPAP as well as a bicarb infusion at 50cc/hr. ABG with mild improvement. AM labs revealed K 6.2 and he was shifted and given kayexalate.Trialysis catheter was placed this morning and he subsequently developed hypotension and was started on levophed. He was  intubated later this morning. Nephrology consulted for CACHORRO. All history obtained by primary team and chart review as patient was intubated/sedated on exam. Consent for dialysis obtained by patient's wife and placed in chart. Of note, both of patient's parents were on dialysis.     Interval History: Paracentesis 3.5 L yesterday. No further nausea. Report appetite improving. Seen in [facility]. Tolerated 3 L net UF.     Review of patient's allergies indicates:   Allergen Reactions    No known drug allergies      Current Facility-Administered Medications   Medication Frequency    0.9%  NaCl infusion PRN    0.9%  NaCl infusion PRN    0.9%  NaCl infusion PRN    0.9%  NaCl infusion Once    acetaminophen tablet 650 mg TID    albuterol-ipratropium 2.5mg-0.5mg/3mL nebulizer solution 3 mL Q4H PRN    ALPRAZolam tablet 0.5 mg Q6H PRN    benzonatate capsule 100 mg TID PRN    calcium carbonate 200 mg calcium (500 mg) chewable tablet 500 mg BID PRN    cetirizine tablet 5 mg Daily    dextrose 50% injection 12.5 g PRN    dextrose 50% injection 25 g PRN    docusate sodium capsule 100 mg BID    epoetin jose miguel injection 8,100 Units Every Mon, Wed, Fri    escitalopram oxalate tablet 20 mg Daily    famotidine tablet 20 mg QHS    glucagon (human recombinant) injection 1 mg PRN    glucose chewable tablet 16 g PRN    glucose chewable tablet 24 g PRN    guaiFENesin 12 hr tablet 1,200 mg BID    heparin (porcine) injection 1,000 Units PRN    heparin (porcine) injection 5,000 Units Q12H    insulin aspart U-100 pen 0-5 Units QID (AC + HS) PRN    insulin aspart U-100 pen 4-8 Units TIDWM    insulin detemir U-100 pen 8 Units BID    levalbuterol nebulizer solution 0.63 mg Q4H    levothyroxine tablet 137 mcg Before breakfast    midodrine tablet 10 mg TID    midodrine tablet 15 mg PRN    mycophenolate capsule 250 mg BID    ondansetron disintegrating tablet 8 mg Q6H PRN    oxyCODONE immediate release tablet 5 mg Q6H PRN     polyethylene glycol packet 17 g BID    predniSONE tablet 5 mg Daily    promethazine (PHENERGAN) 25 mg in dextrose 5 % 50 mL IVPB Q6H PRN    QUEtiapine tablet 50 mg QHS    tacrolimus capsule 3 mg BID       Objective:     Vital Signs (Most Recent):  Temp: 97.4 °F (36.3 °C) (05/16/18 1139)  Pulse: (!) 121 (05/16/18 1251)  Resp: 18 (05/16/18 1251)  BP: 119/71 (05/16/18 1147)  SpO2: 99 % (05/16/18 1251)  O2 Device (Oxygen Therapy): nasal cannula (05/16/18 1251) Vital Signs (24h Range):  Temp:  [97.4 °F (36.3 °C)-98.9 °F (37.2 °C)] 97.4 °F (36.3 °C)  Pulse:  [] 121  Resp:  [16-24] 18  SpO2:  [10 %-100 %] 99 %  BP: (110-144)/(70-91) 119/71     Weight: 79.1 kg (174 lb 6.1 oz) (05/11/18 0400)  Body mass index is 27.31 kg/m².  Body surface area is 1.93 meters squared.    I/O last 3 completed shifts:  In: 1130 [P.O.:1080; IV Piggyback:50]  Out: 3500 [Other:3500]    Physical Exam   Constitutional: He appears well-developed. No distress.   Trach removed   HENT:   Head: Normocephalic and atraumatic.   Eyes: Conjunctivae and EOM are normal.   Cardiovascular: Regular rhythm.  Tachycardia present.    Pulmonary/Chest: He has no wheezes. He has no rales.   L IJ TDC   Abdominal: Soft. He exhibits no distension.   Ascites improved after paracentesis 3.5 L 5/15.    Neurological: He is alert.   Skin:   Right necrotic toes 1-3  Left great toe necrotic-same       Significant Labs:  All labs within the past 24 hours have been reviewed.     Significant Imaging:  Labs: Reviewed    Assessment/Plan:     CACHORRO (acute kidney injury) with ATN superimposed CKD 3     Anuric CACHORRO likely ischemic ATN from prolonged pre-renal state (diarrhea) in setting of prograf renal vasoconstriction; cannot r/o  prograf toxicity  -baseline sCr 2.6-3.0 consistent with CKD stage IV  -SLED started 3/14. TDC  4/4/18.   -Trach and PEG removed 5/10.   -5/16 Remains anuric. Patient seen in [facility]. Tolerating 3 L UF.   -SW assisting with transfer to [facility].       Anemia of CKD  -Iron 70, T sat 35, TIBC 225 and ferritan 1287  -Hgb stable.  -Continue epo. Increased epo 5/4     BMD  Lab Results   Component Value Date    PTH 23.0 04/19/2018    CALCIUM 8.7 05/16/2018    CAION 0.98 (L) 03/17/2018    PHOS 4.5 05/16/2018   Renal diet.                   Thank you for your consult. I will follow-up with patient. Please contact us if you have any additional questions.    Amanda Cuellar NP  Nephrology  [facility]    Patient seen and examined with NETTIE Cuellar;   I have reviewed and agree with assessment and plan    y

## 2018-05-16 NOTE — ASSESSMENT & PLAN NOTE
Malnutrition in the context of Chronic Illness/Injury and Acute Illness/Injury    Related to (etiology):  Poor appetite, prolonged hospitalization, dysphagia and need for EN    Signs and Symptoms (as evidenced by):  Energy Intake: <50% of estimated energy requirement for 1 month  Body Fat Depletion: moderate depletion of orbitals and triceps   Muscle Mass Depletion: moderate depletion of temples, clavicle region and scapular region   Weight Loss: 18% x 2 months       Interventions/Recommendations (treatment strategy):  See recs    Nutrition Diagnosis Status:  Continues

## 2018-05-16 NOTE — ASSESSMENT & PLAN NOTE
Anuric CACHORRO likely ischemic ATN from prolonged pre-renal state (diarrhea) in setting of prograf renal vasoconstriction; cannot r/o  prograf toxicity  -baseline sCr 2.6-3.0 consistent with CKD stage IV  -SLED started 3/14. TDC  4/4/18.   -Trach and PEG removed 5/10.   -5/16 Remains anuric. Patient seen in [facility]. Tolerating 3 L UF.   -SW assisting with transfer to [facility].      Anemia of CKD  -Iron 70, T sat 35, TIBC 225 and ferritan 1287  -Hgb stable.  -Continue epo. Increased epo 5/4     BMD  Lab Results   Component Value Date    PTH 23.0 04/19/2018    CALCIUM 8.7 05/16/2018    CAION 0.98 (L) 03/17/2018    PHOS 4.5 05/16/2018   Renal diet.

## 2018-05-16 NOTE — PT/OT/SLP PROGRESS
Occupational Therapy      Patient Name:  Lv Crocker   MRN:  [id number]    Patient not seen today secondary to Dialysis. Unable to return in PM for 2nd attempt. Will follow-up next scheduled OT session .    Leonor Wagner, OT  5/16/2018

## 2018-05-16 NOTE — ASSESSMENT & PLAN NOTE
- PT/OT/SLP daily.   - Patient needs PT/OT, HD and In patient setting facility due to prolonged hospitalization with resultant debility  - [facility] ready to take in patient pending insurance approval  - continue oral intake and protein supplementation  -  to put another request to insurance for In patient setting SNF ([facility])

## 2018-05-16 NOTE — PLAN OF CARE
Problem: Patient Care Overview  Goal: Plan of Care Review  Outcome: Ongoing (interventions implemented as appropriate)  Pt was AAOX4, totally dependent, and VSS. Pt had HD today. C/o of nausea-managed with Zofran. Heparin injection given. Blood glucose monitoring performed and treated as ordered. Telemetry in place. Pt is at  for lower extremities.

## 2018-05-16 NOTE — ASSESSMENT & PLAN NOTE
- Appreciate Endocrine's recs  - continue basal bolus with titration as needed   - BS labile due to poor oral intake.

## 2018-05-16 NOTE — PT/OT/SLP PROGRESS
"Physical Therapy Treatment    Patient Name:  Lv Crocker   MRN:  [id number]    Recommendations:     Discharge Recommendations:  rehabilitation facility   Discharge Equipment Recommendations:  (TBD)   Barriers to discharge: Inaccessible home and Decreased caregiver support    Assessment:     Lv Crocker is a 42 y.o. male admitted with a medical diagnosis of Acute respiratory failure with hypoxia.  He presents with the following impairments/functional limitations:  weakness, impaired functional mobilty, gait instability, impaired endurance, impaired balance, impaired self care skills, decreased lower extremity function, decreased upper extremity function, impaired skin, decreased ROM, pain, impaired cardiopulmonary response to activity, decreased coordination, impaired fine motor, impaired joint extensibility.  Treatment limited to therex in bed as pt refused OOB activities d/t nausea and fatigue.  Pt tolerated therex c min c/o of R knee pain c flexion past 90 degs.  Pt demo improved BLE strength but still lacking strength for functional mobility.  Pt would benefit from continued skilled acute PT 5x/wk to improve functional mobility.      Rehab Prognosis:  fair; patient would benefit from acute skilled PT services to address these deficits and reach maximum level of function.      Recent Surgery: Procedure(s) (LRB):  INSERTION-CATHETER-PERM-A-CATH (Left)  INSERTION-TUBE-GASTROSTOMY (N/A) 42 Days Post-Op    Plan:     During this hospitalization, patient to be seen 5 x/week to address the above listed problems via gait training, therapeutic activities, therapeutic exercises, neuromuscular re-education  · Plan of Care Expires:  06/01/18   Plan of Care Reviewed with: patient, sibling    Subjective     Communicated with RN prior to session.  Patient found HOB elevated and sibling present upon PT entry to room, agreeable to treatment.      Chief Complaint: nausea  Patient comments/goals: "I will do " "anything as long as its in the bed."  Pain/Comfort:  · Pain Rating 1: 0/10    Patients cultural, spiritual, Spiritism conflicts given the current situation: none    Objective:     Patient found with: pressure relief boots, telemetry, oxygen     General Precautions: Standard, fall, aspiration   Orthopedic Precautions:RLE weight bearing as tolerated, LLE weight bearing as tolerated   Braces:       Functional Mobility:  · Not performed as pt unwilling to participate c OOB activities      AM-PAC 6 CLICK MOBILITY  Turning over in bed (including adjusting bedclothes, sheets and blankets)?: 2  Sitting down on and standing up from a chair with arms (e.g., wheelchair, bedside commode, etc.): 2  Moving from lying on back to sitting on the side of the bed?: 2  Moving to and from a bed to a chair (including a wheelchair)?: 2  Need to walk in hospital room?: 1  Climbing 3-5 steps with a railing?: 1  Total Score: 10       Therapeutic Activities and Exercises:  Educated on importance of OOB activities and performing BLE/UE exercises - v/u  Therex (AP; SKTC; Hip Abd/Add,IR,ER; QS, SLR-assisted) 1x15 ea B    Patient left HOB elevated with all lines intact, call button in reach and RN notified..    GOALS:    Physical Therapy Goals        Problem: Physical Therapy Goal    Goal Priority Disciplines Outcome Goal Variances Interventions   Physical Therapy Goal     PT/OT, PT Ongoing (interventions implemented as appropriate)     Description:  Goals to be met by: 18     Patient will increase functional independence with mobility by performin. Supine to sit with Moderate Assistance.  2. Sit to supine with Moderate Assistance.  3. Rolling to Left and Right with Minimal Assistance.   4. Sit to stand transfer with Moderate Assistance.  5. Bed to chair transfer with Maximum Assistance.  6. Gait  x 5 feet with Minimal Assistance.   7.  Pt to perf and be compliant with LE HEP to improve vascularity and mm strength.                    "          Time Tracking:     PT Received On: 05/16/18  PT Start Time: 1415     PT Stop Time: 1432  PT Total Time (min): 17 min     Billable Minutes: Therapeutic Exercise 17 min    Treatment Type: Treatment  PT/PTA: PT     PTA Visit Number: 0     Corey Hernández, PT  05/16/2018

## 2018-05-16 NOTE — PROGRESS NOTES
"[facility]  Podiatry  Progress Note    Patient Name: Lv Crocker  MRN: [id number]  Admission Date: 3/11/2018  Hospital Length of Stay: 65 days  Attending Physician: Jose A Lloyd MD  Primary Care Provider: Philippe Mohr MD   Interval Hx: Patient seen at bedside.  Complains of nausea and chills, saying he can't get warm.  Patient complains with pain in both calves.  No other pedal complaints.    Scheduled Meds:   acetaminophen  650 mg Oral TID    cetirizine  5 mg Oral Daily    docusate sodium  100 mg Oral BID    epoetin jose miguel (PROCRIT) injection  100 Units/kg (Dosing Weight) Intravenous Every Mon, Wed, Fri    escitalopram oxalate  20 mg Oral Daily    famotidine  20 mg Oral QHS    guaiFENesin  1,200 mg Oral BID    heparin (porcine)  5,000 Units Subcutaneous Q12H    insulin aspart U-100  4-8 Units Subcutaneous TIDWM    insulin detemir U-100  8 Units Subcutaneous BID    levalbuterol  0.63 mg Nebulization Q4H    levothyroxine  137 mcg Oral Before breakfast    midodrine  10 mg Oral TID    mycophenolate  250 mg Oral BID    polyethylene glycol  17 g Oral BID    predniSONE  5 mg Oral Daily    QUEtiapine  50 mg Oral QHS    tacrolimus  3 mg Oral BID     Continuous Infusions:    PRN Meds:sodium chloride 0.9%, sodium chloride 0.9%, sodium chloride 0.9%, albuterol-ipratropium 2.5mg-0.5mg/3mL, ALPRAZolam, benzonatate, calcium carbonate, dextrose 50%, dextrose 50%, glucagon (human recombinant), glucose, glucose, heparin (porcine), insulin aspart U-100, midodrine, ondansetron, oxyCODONE, promethazine (PHENERGAN) IVPB    Review of patient's allergies indicates:   Allergen Reactions    No known drug allergies         Past Medical History:   Diagnosis Date    Anxiety     Arthritis     Ascites     Thought to be 2/2 heart tpx; liver bx 3/31/17 w/o significant fibrosis    Cardiomyopathy     Depression     Controlled "for the most part" on Lexipro    Encounter for blood transfusion  "    during transplant    GERD (gastroesophageal reflux disease)     Hashimoto's disease     History of CHF (congestive heart failure)     Previously EF 20% (prior to heart transplant); last EF 60%, PAP 41 as of 12/12/17; throught to be 2/2 genetics & DM1    History of coronary artery disease     H/o Coronary artery disease; resolved since heart transplant 2014    History of myocardial infarction     h/o MI x3 prior to heart transplant    HLD (hyperlipidemia) 6/11/2015    Hx of psychiatric care     Hypoglycemia unawareness in type 1 diabetes mellitus     Hypothyroid     Initially hyperthyroid 2/2 Hoshimoto's thyroiditis; now on levothyroxine 150 mcg qd    Pleural effusion due to another disorder     Thought to be 2/2 heart tpx; s/p PleuRx catheter placement and removal after 1-2 months    Psychiatric problem     Pulmonary hypertension assoc with unclear multi-factorial mechanisms 12/12/2017    PAP 41 (EF 60%) on ECHO 12/12/17    Syncope 6/30/2015    Type I diabetes mellitus, well controlled     Well controlled; Dx'd @5y/o; on N insulin 20U BID & Humalog 10U w/meals +SSI; Glu 89 11/9/17; A1c 7.2% 4/5/17    Unspecified essential hypertension 05/29/2014    Well controlled; Last /57 on 11/9/17     Past Surgical History:   Procedure Laterality Date    CARDIAC CATHETERIZATION      CARDIAC SURGERY      CHOLECYSTECTOMY      COLONOSCOPY N/A 3/13/2018    Procedure: COLONOSCOPY;  Surgeon: Tim Spencer MD;  Location: [facility] ([address] [place] [address]);  Service: Endoscopy;  Laterality: N/A;    CORONARY ANGIOPLASTY      FOOT SPLIT TRANSFER OF THE POSTERIOR TIBIALIS TENDON PROCEDURE      HEART TRANSPLANT  2014    KNEE SURGERY      PleuRx catheter placement  01/11/2018    Plan for removal after 1-2 months by Dr. James in cardiothoracic surgery    s/p oht  November 2014    stent placemnet         Family History     Problem Relation (Age of Onset)    Cancer Brother (50)    Diabetes Mother, Father, Brother,  Son, Sister, Maternal Uncle, Paternal Aunt, Maternal Grandmother, Maternal Grandfather    Heart disease Mother, Brother    Hypertension Mother, Father, Brother    Kidney disease Mother, Father    Stroke Father, Brother    Vision loss Brother        Social History Main Topics    Smoking status: Never Smoker    Smokeless tobacco: Never Used    Alcohol use No    Drug use: No    Sexual activity: Yes     Partners: Female     Review of Systems   Skin: Positive for color change, pallor and wound.     Objective:     Vital Signs (Most Recent):  Temp: 97.4 °F (36.3 °C) (05/16/18 1139)  Pulse: (!) 121 (05/16/18 1251)  Resp: 18 (05/16/18 1251)  BP: 119/71 (05/16/18 1147)  SpO2: 99 % (05/16/18 1251) Vital Signs (24h Range):  Temp:  [97.4 °F (36.3 °C)-98.9 °F (37.2 °C)] 97.4 °F (36.3 °C)  Pulse:  [] 121  Resp:  [16-24] 18  SpO2:  [10 %-100 %] 99 %  BP: (110-144)/(70-91) 119/71     Weight: 79.1 kg (174 lb 6.1 oz)  Body mass index is 27.31 kg/m².    Foot Exam    General  General Appearance: appears stated age and healthy   Affect: appropriate       Right Foot/Ankle     Inspection and Palpation  Tenderness: great toe metatarsophalangeal joint   Skin Exam: skin changes and abnormal color;     Neurovascular  Dorsalis pedis: 1+  Posterior tibial: 1+  Saphenous nerve sensation: diminished  Tibial nerve sensation: diminished  Superficial peroneal nerve sensation: diminished  Deep peroneal nerve sensation: diminished  Sural nerve sensation: diminished      Left Foot/Ankle      Inspection and Palpation  Tenderness: great toe interphalangeal joint and great toe metatarsophalangeal joint   Skin Exam: skin changes;     Neurovascular  Dorsalis pedis: 1+  Posterior tibial: 1+  Saphenous nerve sensation: diminished  Tibial nerve sensation: diminished  Superficial peroneal nerve sensation: diminished  Deep peroneal nerve sensation: diminished  Sural nerve sensation: diminished          Gangrene noted to hallux and 3rd toe. Stable no  purulent drainage noted. Ischemic changes noted to right second toe. Pain upon palpation to right hallux and 3rd toe.     Superficial eschar on distal posterior leg.  No purulence, erythema, edema, or maloder                                Mild ischemic changes noted to left hallux. CFT <3s. No purulent drainage noted.     Discoloration of distal posterior left leg.  No purulence, erythema, edema, or maloder      Left foot.                                   Laboratory:  CBC:     Recent Labs  Lab 05/16/18  0400   WBC 4.33   RBC 2.98*   HGB 8.6*   HCT 28.4*      MCV 95   MCH 28.9   MCHC 30.3*     CMP:     Recent Labs  Lab 05/16/18  0623   GLU 80   CALCIUM 8.7   ALBUMIN 2.4*   PROT 5.8*   *   K 4.0   CO2 24   CL 93*   BUN 37*   CREATININE 3.5*   ALKPHOS 126   ALT 6*   AST 11   BILITOT 0.4       Diagnostic Results:  Xray: Left: No fracture dislocation bone destruction seen.  There is mild DJD.  There are vascular changes suggesting diabetes.  There are spurs on the calcaneus.    Right: There is hardware consistent with talocalcaneal arthrodesis.  There are vascular calcification suggesting diabetes.  No fracture dislocation bone destruction seen.    Clinical Findings:  Dry stable gangrene noted to right hallux and 3rd toe. Ischemic changes noted to right 2nd and left hallux.     Assessment/Plan:     Gangrene    Lv Crocker is a 42 y.o. 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 Ischemic changes to right second toe and left hallux.  Stable, no sings of infection  -Painted with betadine, nursing to paint toes with betadine daily  -Will likely need amputation in the future, but due to stable status, and complicated medical history, will defer surgery at this time.  -Patient with pain in b/l calves, ordered venous US to access for DVT.  -continue wearing heel protector boots when in bed.    DC instructions: patient to follow up with podiatry within one week of discharge.  Patient to  have toes painted with betadine daily.    Weightbearing Status: WBAT B/L  Offloading Device: DARCO shoe.             Type 1 diabetes mellitus with stage 3 chronic kidney disease    Per primary         Heart transplanted    Per primary            Aston Head MD  Podiatry  [facility]

## 2018-05-16 NOTE — PLAN OF CARE
BG and insulin regimen reviewed.    AM BG still below goal. Patient tolerating minimal PO.  Some nausea, but more so decreased appetite.    Remains on PDN and prograf, which can increase insulin requirements.    Recommend decreasing Levemir to 8u BID  Continue Novolog 4-8u AC according to PO% completion  Continue low dose correction insulin.  BG monitoring ac/hs    Also continue LT4 137mcg.  Rechecking TSH ~6/2/18    Deonna Larry MD  Endocrinology Fellow

## 2018-05-16 NOTE — PROGRESS NOTES
[facility]  Heart Transplant  Progress Note    Patient Name: Lv Crocker  MRN: [id number]  Admission Date: 3/11/2018  Hospital Length of Stay: 65 days  Attending Physician: Jose A Lloyd MD  Primary Care Provider: Philippe Mohr MD  Principal Problem:Acute respiratory failure with hypoxia    Subjective:     Interval History:     Had HD today. Report mild nausea after treatment without emesis. Tacrolimus elevated to 14.9 today and dosing deescalated to 3/3 starting this evening. Requires  In patient REHAB that can do HD/PT/OT due to prolonged hospitalization associated debility.     Continuous Infusions:  Scheduled Meds:   acetaminophen  650 mg Oral TID    cetirizine  5 mg Oral Daily    docusate sodium  100 mg Oral BID    epoetin jose miguel (PROCRIT) injection  100 Units/kg (Dosing Weight) Intravenous Every Mon, Wed, Fri    escitalopram oxalate  20 mg Oral Daily    famotidine  20 mg Oral QHS    guaiFENesin  1,200 mg Oral BID    heparin (porcine)  5,000 Units Subcutaneous Q12H    insulin aspart U-100  4-8 Units Subcutaneous TIDWM    insulin detemir U-100  8 Units Subcutaneous BID    levalbuterol  0.63 mg Nebulization Q4H    levothyroxine  137 mcg Oral Before breakfast    midodrine  10 mg Oral TID    mycophenolate  250 mg Oral BID    polyethylene glycol  17 g Oral BID    predniSONE  5 mg Oral Daily    QUEtiapine  50 mg Oral QHS    tacrolimus  3 mg Oral BID     PRN Meds:sodium chloride 0.9%, sodium chloride 0.9%, sodium chloride 0.9%, albuterol-ipratropium 2.5mg-0.5mg/3mL, ALPRAZolam, benzonatate, calcium carbonate, dextrose 50%, dextrose 50%, glucagon (human recombinant), glucose, glucose, heparin (porcine), insulin aspart U-100, midodrine, ondansetron, oxyCODONE, promethazine (PHENERGAN) IVPB    Review of patient's allergies indicates:   Allergen Reactions    No known drug allergies      Objective:     Vital Signs (Most Recent):  Temp: 97.4 °F (36.3 °C) (05/16/18  1139)  Pulse: (!) 121 (05/16/18 1251)  Resp: 18 (05/16/18 1251)  BP: 119/71 (05/16/18 1147)  SpO2: 99 % (05/16/18 1251) Vital Signs (24h Range):  Temp:  [97.4 °F (36.3 °C)-98.9 °F (37.2 °C)] 97.4 °F (36.3 °C)  Pulse:  [] 121  Resp:  [16-24] 18  SpO2:  [10 %-100 %] 99 %  BP: (110-144)/(70-91) 119/71     No data found.    Body mass index is 27.31 kg/m².      Intake/Output Summary (Last 24 hours) at 05/16/18 1406  Last data filed at 05/16/18 0500   Gross per 24 hour   Intake              900 ml   Output             3500 ml   Net            -2600 ml       Hemodynamic Parameters:       Telemetry:     Physical Exam  Constitutional: He is oriented to person, place, and time.   Neck: No JVD present.   Decannulated trach. Dressing in place   Cardiovascular: Regular rhythm and normal heart sounds.    Tachycardia noted.   Pulmonary/Chest: Effort normal and breath sounds normal.   Abdominal: Soft. Bowel sounds are normal.   Musculoskeletal: He exhibits no edema or tenderness.   Neurological: He is alert and oriented to person, place, and time.   Skin: Skin is warm and dry.   Psychiatric: better mood and affect this morning. More engaging  Nursing note and vitals reviewed.    Significant Labs:  CBC:    Recent Labs  Lab 05/14/18  0600 05/15/18  0400 05/16/18  0400   WBC 16.31* 7.60 4.33   RBC 3.15* 2.93* 2.98*   HGB 9.3* 8.7* 8.6*   HCT 30.2* 27.8* 28.4*   * 275 214   MCV 96 95 95   MCH 29.5 29.7 28.9   MCHC 30.8* 31.3* 30.3*     BNP:  No results for input(s): BNP in the last 168 hours.    Invalid input(s): BNPTRIAGELBLO  CMP:    Recent Labs  Lab 05/14/18  0600 05/15/18  0400 05/16/18  0623   * 102 80   CALCIUM 9.3 8.5* 8.7   ALBUMIN 2.4* 2.3* 2.4*   PROT 6.2 5.9* 5.8*   * 130* 131*   K 4.5 3.2* 4.0   CO2 19* 24 24   CL 95 93* 93*   BUN 43* 28* 37*   CREATININE 3.4* 2.6* 3.5*   ALKPHOS 142* 132 126   ALT 9* 7* 6*   AST 11 10 11   BILITOT 0.4 0.4 0.4      Coagulation:   No results for input(s): PT, INR,  APTT in the last 168 hours.  LDH:  No results for input(s): LDH in the last 72 hours.  Microbiology:  Microbiology Results (last 7 days)     Procedure Component Value Units Date/Time    Culture, Anaerobe [[id number]] Collected:  05/15/18 1518    Order Status:  Completed Specimen:  Ascites from Ascites Updated:  05/16/18 0733     Anaerobic Culture Culture in progress    Aerobic culture [[id number]] Collected:  05/15/18 1518    Order Status:  Completed Specimen:  Ascites from Ascites Updated:  05/16/18 0713     Aerobic Bacterial Culture No growth    Gram stain [[id number]] Collected:  05/15/18 1518    Order Status:  Completed Specimen:  Ascites from Ascites Updated:  05/15/18 2004     Gram Stain Result No WBC's      No organisms seen    Culture, Anaerobe [[id number]] Collected:  05/10/18 1411    Order Status:  Completed Specimen:  Ascites from Ascites Updated:  05/14/18 1335     Anaerobic Culture Culture in progress    Aerobic culture [[id number]] Collected:  05/10/18 1411    Order Status:  Completed Specimen:  Ascites from Ascites Updated:  05/14/18 0916     Aerobic Bacterial Culture No growth    Blood culture [[id number]] Collected:  05/05/18 0644    Order Status:  Completed Specimen:  Blood from Midline, Basilic, Right Updated:  05/10/18 1012     Blood Culture, Routine No growth after 5 days.    Blood culture [[id number]] Collected:  05/05/18 0812    Order Status:  Completed Specimen:  Blood Updated:  05/10/18 1012     Blood Culture, Routine No growth after 5 days.    Narrative:       Collection has been rescheduled by CDP at 5/5/2018 07:40 Reason: Due   now  Collection has been rescheduled by CDP at 5/5/2018 07:40 Reason: Due   now    AFB Culture & Smear [[id number]] Collected:  03/28/18 1607    Order Status:  Completed Specimen:  Body Fluid from Lung, RLL Updated:  05/10/18 0927     AFB Culture & Smear Culture in progress     AFB Culture & Smear Culture in progress     AFB CULTURE STAIN No acid fast bacilli seen.           I have reviewed all pertinent labs within the past 24 hours.    Estimated Creatinine Clearance: 25.2 mL/min (A) (based on SCr of 3.5 mg/dL (H)).    Diagnostic Results:  I have reviewed and interpreted all pertinent imaging results/findings within the past 24 hours.    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 Most recently has had ongoing issues with his lungs, had gotten 2 thoracenteses, after which he underwent VATS 01/19/18 followed by pleurex catheter placement and effusion drainage 01/11/18, subsequently had it removed 02/07/18 after drainage had decreased despite multiple attempts to drain it. Has been having significant coughing fits that result in him being near-syncopal at times, although difficult to say if he has passed out or not- patient most recently was admitted to the hospital for increased AGUILAR, coughing and pre-syncope 02/11-02/14/18 at [facility].   Patient was started on antibiotics for suspected bacterial infection, with some diarrhea, bronchitis, and possible pneumonia. Ct chest showed some pleural fluid collection and after talking with Dr. James, we arranged for him to receive a diagnostic tap with IR, from which the fluid collection demonstrated no growth or significant findings at all really. Cell counts do not appear to have been sent but will recheck. Patient states since his hospital stay, yesterday had a significant coughing fit again, after which he nearly passed out, is being seen in clinic today for this. Denies any cardiopulmonary complaints, no leg swelling, has some abdominal swelling, which is moderate for him. Denies significant shortness of breath with mild exertion, had 6MWT yesterday to see if he qualified for home O2, and his O2 sats actually improved after recovery from where  he started initially. He and his wife admit he is in bed most days, not very active.     Mr. Crocker presented to the ED 3/11/18 with approximately 3 weeks of worsening diarrhea and 2-3 days of sinus pressure, drainage, and worsened cough.  He notes that he had a coughing fit last night and passed out, coughed throughout most of the night.  This also happened on 2/25/18.  He last saw Dr Ferreira in clinic on 2/20/18 where he was taken off myfortic and switched to cellcept (he hadn't been on cellcept because of leukopenia when they tried post-transplant).  Though his diarrhea had improved after his last discharge, he states it has increased now to 15x/day, clear/yellow/green, no fevers, no exacerbating foods per say.  He was taken back off the cellcept and placed back on myfortic this past week and has not noticed immediate change in diarrhea. He also reports his baseline coughing fits havent improved and are minimally responsive to tessalon pearls.  Came on last night, he lost consciousness during a fit once which is relatively normal for him, and had two more during HPI.  He notes no sick contacts but does state he's had some URI symptoms as above.  His baseline vitals are usually -120 and BP 90s/60s-110s/70s.  He reports a history of intermittent diarrhea - did have Cdiff many years ago but this smells different.  No abdominal pain, no nausea, no vomiting.  No blood in diarrhea.  Appears last c-scope in 2015 with no evidence of infection or inflammatory processes.  He does report IBS which is different that this.       * Acute respiratory failure with hypoxia    - Resolved. Now with trach decannulation  - Etiology = RSV   - Stoma care / education provided by pulmonology   - Continue pulmonary toilet to help with respiratory mechanics   - Appreciate PT/OT/Wound care.  - Plan is to finish 7 day course of moxifloxacin   - D/C pulse steroids on 5/14. Will discuss with pharmacy if current 5mg is necessary for OH  -  Continue to wean HFNC and pulmonary toilet  - repeat CXR shows improved aeration  - HD with UF of 3Liters removal        Debility    - PT/OT/SLP daily.   - Patient needs PT/OT, HD and In patient setting facility due to prolonged hospitalization with resultant debility  - [facility] ready to take in patient pending insurance approval  - continue oral intake and protein supplementation  -  to put another request to insurance for In patient Rehab setting          Heart transplanted    - TTE 4/24/18 showed normal graft function but has known history of restrictive CM post transplant.  - Continue Prednisone 5mg daily and Cellcept 250mg BID   - Tacro level 14.9 today from 15.9 yesterday.  - changeTacrolimus 3/3 per pharmacy starting this evening. Goal is 6-10        Other ascites    - s/p paracentesis yesterday with removal of 3.5L  - GS and cultures pending results        Leukocytosis    - back to baseline today. Possibly a lab error yesterday        Abdominal distention    - Repeat paracentesis requested for Tuesday 5/15 (Spoke with IR)   - Albumin if >5Ls removed   - Only 300mL removed with previous paracentesis (5/10/18)        Alteration in skin integrity    - Dry gangrene on toes. No surgical intervention indicated.   - Wound care following. Local care only at this point.   - podiatry continuing to fu during stay        ESRD (end stage renal disease)    - Appreciate Nephrology recs.   - Continue midodrine  - HD per nephrology SAVANNA ZARATE         Restrictive cardiomyopathy    - Requested more fluid removal with dialysis         Type 1 diabetes mellitus with stage 3 chronic kidney disease    - Appreciate Endocrine's recs  - continue basal bolus with titration as needed   - BS labile due to poor oral intake.        Hypothyroidism due to Hashimoto's thyroiditis    - Appreciate Endocrine's recs  - Switched to PO Levothyroxine 137mcg daily        Anemia    - Nephrology is managing ProCrit   - Hgb stable          Anxiety    - continueLexapro to 20mg daily  - Continue seroquel 50 QHS   - Continue .5mg xanax PRN Q8H   - Appreciate psychiatry input.           Discussed plan of care with Dr. church.    Josiah Walsh MD  Heart Transplant  [facility]

## 2018-05-16 NOTE — ASSESSMENT & PLAN NOTE
Lv Crocker is a 42 y.o. 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 Ischemic changes to right second toe and left hallux.  Stable, no sings of infection  -Painted with betadine, nursing to paint toes with betadine daily  -Will likely need amputation in the future, but due to stable status, and complicated medical history, will defer surgery at this time.  -Patient with pain in b/l calves, ordered venous US to access for DVT.  -continue wearing heel protector boots when in bed.    DC instructions: patient to follow up with podiatry within one week of discharge.  Patient to have toes painted with betadine daily.    Weightbearing Status: WBAT B/L  Offloading Device: DARCO shoe.

## 2018-05-16 NOTE — PROGRESS NOTES
" [facility]  Adult Nutrition  Progress Note    SUMMARY       Recommendations    Recommendation/Intervention:   -Continue regular diet. Noted K WNL / low. Send Novasource BID.   -Urged pt to consume adequate protein/calories plus ONS.   -Pt is now only meeting ~25% of EEN/EPN. If PO intake does not improve to =\>50% of meals, recommend resuming tube feeds.   -Consider appetite stimulant.   -RD following.     Goals: Pt to consume and tolerate at least 50-75% of meals within 4 days.  Nutrition Goal Status: new  Communication of RD Recs:  (POC)    Reason for Assessment    Reason for Assessment: RD follow-up  Diagnosis: other (see comments) (Resp. fx)  Relevant Medical History: Hashimoto's disease, GERD, HLD, T1DM, HTN, CHF, Heart tx (2014)  Interdisciplinary Rounds: did not attend  General Information Comments: Appetite has declined in past few days, frequent nausea and vomiting, attempting to eat soup and crackers at visit, possible gastroparesis, remains HD dependent and paracentesis  Nutrition Discharge Planning: Adequate PO nutrition    Nutrition Risk Screen    Nutrition Risk Screen: large or nonhealing wound, burn or pressure ulcer    Nutrition/Diet History    Patient Reported Diet/Restrictions/Preferences: general  Food Preferences: refusing ONS  Do you have any cultural, spiritual, Islam conflicts, given your current situation?: none  Food Allergies: NKFA  Factors Affecting Nutritional Intake: altered gastrointestinal function (TF discontinued today; patient reports abdomen feels "heavy")    Anthropometrics    Temp: 97.4 °F (36.3 °C)  Height Method: Stated  Height: 5' 7" (170.2 cm)  Height (inches): 67 in  Weight Method: Bed Scale  Weight: 79.1 kg (174 lb 6.1 oz)  Weight (lb): 174.39 lb  Ideal Body Weight (IBW), Male: 148 lb  % Ideal Body Weight, Male (lb): 117.68 lb  BMI (Calculated): 27.3  BMI Grade: 25 - 29.9 - overweight  Usual Body Weight (UBW), kg:  (HAO)   "     Lab/Procedures/Meds    Pertinent Labs Reviewed: reviewed  Pertinent Labs Comments: Na 131, BUN 37, Cr 3.5, GFR 20  Pertinent Medications Reviewed: reviewed  Pertinent Medications Comments: docusate, epoetin, insulin, prednisone, tacrolimus    Physical Findings/Assessment    Overall Physical Appearance: loss of muscle mass, weak  Tubes:  (removed and healing)  Oral/Mouth Cavity: WDL  Skin: incision(s)    Estimated/Assessed Needs    Weight Used For Calorie Calculations: 79 kg (174 lb 2.6 oz)  Energy Calorie Requirements (kcal): 1967 kcal/day  Energy Need Method: [place]-St Jeor (x 1.2)  Protein Requirements:  g/day (1.2-1.4 g/kg)  Weight Used For Protein Calculations: 79 kg (174 lb 2.6 oz)  Fluid Requirements (mL): 80-94  Fluid Need Method: other (see comments) (Per MD or 1 mL/kcal)  RDA Method (mL): 1967  CHO Requirement: 50% total kcals      Nutrition Prescription Ordered    Current Diet Order: Regular  Current Nutrition Support Formula Ordered: Other (Comment) (discontinued)  Current Nutrition Support Rate Ordered: 0 (ml)  Current Nutrition Support Frequency Ordered: -    Evaluation of Received Nutrient/Fluid Intake    Enteral Calories (kcal): 0  Enteral Protein (gm): 0  Enteral (Free Water) Fluid (mL): 0  Other Calories (kcal): 0  % Kcal Needs: -  % Protein Needs: -  IV Fluid (mL): 0  Energy Calories Required: not meeting needs  Protein Required: not meeting needs  Fluid Required: meeting needs  Comments: LBM 5/15  Tolerance: tolerating  % Intake of Estimated Energy Needs: 25 - 50 %  % Meal Intake: 25 - 50 %    Nutrition Risk    Level of Risk/Frequency of Follow-up:  (FU 1x/week)     Assessment and Plan    Severe malnutrition    Malnutrition in the context of Chronic Illness/Injury and Acute Illness/Injury    Related to (etiology):  Poor appetite, prolonged hospitalization, dysphagia and need for EN    Signs and Symptoms (as evidenced by):  Energy Intake: <50% of estimated energy requirement for 1  month  Body Fat Depletion: moderate depletion of orbitals and triceps   Muscle Mass Depletion: moderate depletion of temples, clavicle region and scapular region   Weight Loss: 18% x 2 months       Interventions/Recommendations (treatment strategy):  See recs    Nutrition Diagnosis Status:  Continues               Monitor and Evaluation    Food and Nutrient Intake: energy intake, food and beverage intake  Food and Nutrient Adminstration: diet order  Knowledge/Beliefs/Attitudes: food and nutrition knowledge/skill, beliefs and attitudes  Physical Activity and Function: nutrition-related ADLs and IADLs  Anthropometric Measurements: weight, weight change  Biochemical Data, Medical Tests and Procedures: electrolyte and renal panel, gastrointestinal profile, glucose/endocrine profile, inflammatory profile, lipid profile  Nutrition-Focused Physical Findings: overall appearance     Nutrition Follow-Up    RD Follow-up?: Yes

## 2018-05-16 NOTE — ASSESSMENT & PLAN NOTE
- TTE 4/24/18 showed normal graft function but has known history of restrictive CM post transplant.  - Continue Prednisone 5mg daily and Cellcept 250mg BID   - Tacro level 14.9 today from 15.9 yesterday.  - changeTacrolimus 3/3 per pharmacy starting this evening. Goal is 6-10

## 2018-05-16 NOTE — SUBJECTIVE & OBJECTIVE
Interval History: Paracentesis 3.5 L yesterday. No further nausea. Report appetite improving. Seen in [facility]. Tolerated 3 L net UF.     Review of patient's allergies indicates:   Allergen Reactions    No known drug allergies      Current Facility-Administered Medications   Medication Frequency    0.9%  NaCl infusion PRN    0.9%  NaCl infusion PRN    0.9%  NaCl infusion PRN    0.9%  NaCl infusion Once    acetaminophen tablet 650 mg TID    albuterol-ipratropium 2.5mg-0.5mg/3mL nebulizer solution 3 mL Q4H PRN    ALPRAZolam tablet 0.5 mg Q6H PRN    benzonatate capsule 100 mg TID PRN    calcium carbonate 200 mg calcium (500 mg) chewable tablet 500 mg BID PRN    cetirizine tablet 5 mg Daily    dextrose 50% injection 12.5 g PRN    dextrose 50% injection 25 g PRN    docusate sodium capsule 100 mg BID    epoetin jose miguel injection 8,100 Units Every Mon, Wed, Fri    escitalopram oxalate tablet 20 mg Daily    famotidine tablet 20 mg QHS    glucagon (human recombinant) injection 1 mg PRN    glucose chewable tablet 16 g PRN    glucose chewable tablet 24 g PRN    guaiFENesin 12 hr tablet 1,200 mg BID    heparin (porcine) injection 1,000 Units PRN    heparin (porcine) injection 5,000 Units Q12H    insulin aspart U-100 pen 0-5 Units QID (AC + HS) PRN    insulin aspart U-100 pen 4-8 Units TIDWM    insulin detemir U-100 pen 8 Units BID    levalbuterol nebulizer solution 0.63 mg Q4H    levothyroxine tablet 137 mcg Before breakfast    midodrine tablet 10 mg TID    midodrine tablet 15 mg PRN    mycophenolate capsule 250 mg BID    ondansetron disintegrating tablet 8 mg Q6H PRN    oxyCODONE immediate release tablet 5 mg Q6H PRN    polyethylene glycol packet 17 g BID    predniSONE tablet 5 mg Daily    promethazine (PHENERGAN) 25 mg in dextrose 5 % 50 mL IVPB Q6H PRN    QUEtiapine tablet 50 mg QHS    tacrolimus capsule 3 mg BID       Objective:     Vital Signs (Most Recent):  Temp: 97.4 °F (36.3 °C) (05/16/18  1139)  Pulse: (!) 121 (05/16/18 1251)  Resp: 18 (05/16/18 1251)  BP: 119/71 (05/16/18 1147)  SpO2: 99 % (05/16/18 1251)  O2 Device (Oxygen Therapy): nasal cannula (05/16/18 1251) Vital Signs (24h Range):  Temp:  [97.4 °F (36.3 °C)-98.9 °F (37.2 °C)] 97.4 °F (36.3 °C)  Pulse:  [] 121  Resp:  [16-24] 18  SpO2:  [10 %-100 %] 99 %  BP: (110-144)/(70-91) 119/71     Weight: 79.1 kg (174 lb 6.1 oz) (05/11/18 0400)  Body mass index is 27.31 kg/m².  Body surface area is 1.93 meters squared.    I/O last 3 completed shifts:  In: 1130 [P.O.:1080; IV Piggyback:50]  Out: 3500 [Other:3500]    Physical Exam   Constitutional: He appears well-developed. No distress.   Trach removed   HENT:   Head: Normocephalic and atraumatic.   Eyes: Conjunctivae and EOM are normal.   Cardiovascular: Regular rhythm.  Tachycardia present.    Pulmonary/Chest: He has no wheezes. He has no rales.   L IJ TDC   Abdominal: Soft. He exhibits no distension.   Ascites improved after paracentesis 3.5 L 5/15.    Neurological: He is alert.   Skin:   Right necrotic toes 1-3  Left great toe necrotic-same       Significant Labs:  All labs within the past 24 hours have been reviewed.     Significant Imaging:  Labs: Reviewed

## 2018-05-16 NOTE — PLAN OF CARE
AAO x 4. VSS, afebrile, SpO2>95% on 2L NC. Telemetry monitoring-SR. BG monitoring-no coverage indicated. No N/V overnight. Trach site with gauze dressing CDI. Old PEG site healing. L ab para site with gauze dressing CDI. Fall precautions maintained and pt repositioned Q2H. Plan for HD today. POC reviewed with pt. See flowsheet for assessment findings. Will continue to monitor.

## 2018-05-16 NOTE — PROGRESS NOTES
Dialysis tx completed. 3.5 hours. 2 liters removed. Left CVC locked with heparin, capped and secured. Tolerated tx well. Report given to Leatha CHRISTINA

## 2018-05-17 NOTE — PROGRESS NOTES
[facility]  Heart Transplant  Progress Note    Patient Name: Lv Crocker  MRN: [id number]  Admission Date: 3/11/2018  Hospital Length of Stay: 66 days  Attending Physician: Jose A Lloyd MD  Primary Care Provider: Philippe Mohr MD  Principal Problem:Acute respiratory failure with hypoxia    Subjective:     Interval History:    No acute overnight issues. Good appetite this morning. Off oxygen supplementation with SPO2 in normal range. Repeat CXR shows significant clearing to the left lung. [facility] with plan to d/c tomorrow. Undergoing in patient PT/OT.    Continuous Infusions:  Scheduled Meds:   acetaminophen  650 mg Oral TID    cetirizine  5 mg Oral Daily    docusate sodium  100 mg Oral BID    dronabinol  2.5 mg Oral BID    epoetin jose miguel (PROCRIT) injection  100 Units/kg (Dosing Weight) Intravenous Every Mon, Wed, Fri    escitalopram oxalate  20 mg Oral Daily    famotidine  20 mg Oral QHS    guaiFENesin  1,200 mg Oral BID    heparin (porcine)  5,000 Units Subcutaneous Q12H    insulin aspart U-100  4-8 Units Subcutaneous TIDWM    insulin detemir U-100  8 Units Subcutaneous BID    levalbuterol  0.63 mg Nebulization Q4H While awake    levothyroxine  137 mcg Oral Before breakfast    midodrine  10 mg Oral TID    mycophenolate  250 mg Oral BID    polyethylene glycol  17 g Oral BID    predniSONE  5 mg Oral Daily    QUEtiapine  50 mg Oral QHS    tacrolimus  3 mg Oral BID     PRN Meds:sodium chloride 0.9%, sodium chloride 0.9%, sodium chloride 0.9%, albuterol-ipratropium, ALPRAZolam, benzonatate, calcium carbonate, dextrose 50%, dextrose 50%, glucagon (human recombinant), glucose, glucose, heparin (porcine), insulin aspart U-100, midodrine, ondansetron, oxyCODONE, promethazine (PHENERGAN) IVPB    Review of patient's allergies indicates:   Allergen Reactions    No known drug allergies      Objective:     Vital Signs (Most  Recent):  Temp: 98.1 °F (36.7 °C) (05/17/18 1238)  Pulse: (!) 119 (05/17/18 1325)  Resp: 18 (05/17/18 1325)  BP: 120/79 (05/17/18 1238)  SpO2: 98 % (05/17/18 1325) Vital Signs (24h Range):  Temp:  [97.8 °F (36.6 °C)-98.7 °F (37.1 °C)] 98.1 °F (36.7 °C)  Pulse:  [101-121] 119  Resp:  [16-18] 18  SpO2:  [92 %-99 %] 98 %  BP: (108-132)/(61-80) 120/79     No data found.    Body mass index is 27.31 kg/m².      Intake/Output Summary (Last 24 hours) at 05/17/18 1338  Last data filed at 05/17/18 0845   Gross per 24 hour   Intake              530 ml   Output                0 ml   Net              530 ml       Hemodynamic Parameters:       Telemetry:     Physical Exam    Physical Exam  Constitutional: He is oriented to person, place, and time.   Neck: No JVD present.   Decannulated trach. Dressing in place.    Cardiovascular: Regular rhythm and normal heart sounds.    Tachycardia noted.   Pulmonary/Chest: Effort normal and breath sounds normal.   Abdominal: Soft. Bowel sounds are normal.   Musculoskeletal: He exhibits no edema or tenderness.   Neurological: He is alert and oriented to person, place, and time.   Skin: Skin is warm and dry. Multiple toe necrotic lesion   Psychiatric: better mood and affect this morning. More engaging  Nursing note and vitals reviewed.  Significant Labs:  CBC:    Recent Labs  Lab 05/15/18  0400 05/16/18  0400 05/17/18  0528   WBC 7.60 4.33 5.01   RBC 2.93* 2.98* 3.14*   HGB 8.7* 8.6* 9.3*   HCT 27.8* 28.4* 30.6*    214 249   MCV 95 95 98   MCH 29.7 28.9 29.6   MCHC 31.3* 30.3* 30.4*     BNP:  No results for input(s): BNP in the last 168 hours.    Invalid input(s): BNPTRIAGELBLO  CMP:    Recent Labs  Lab 05/15/18  0400 05/16/18  0623 05/17/18  0527    80 134*   CALCIUM 8.5* 8.7 8.8   ALBUMIN 2.3* 2.4* 2.3*   PROT 5.9* 5.8* 5.6*   * 131* 135*   K 3.2* 4.0 4.7   CO2 24 24 23   CL 93* 93* 101   BUN 28* 37* 20   CREATININE 2.6* 3.5* 2.5*   ALKPHOS 132 126 131   ALT 7* 6* 9*   AST  10 11 14   BILITOT 0.4 0.4 0.4      Coagulation:   No results for input(s): PT, INR, APTT in the last 168 hours.  LDH:  No results for input(s): LDH in the last 72 hours.  Microbiology:  Microbiology Results (last 7 days)     Procedure Component Value Units Date/Time    Culture, Anaerobe [[id number]] Collected:  05/15/18 1518    Order Status:  Completed Specimen:  Ascites from Ascites Updated:  05/17/18 1029     Anaerobic Culture Culture in progress    Culture, Anaerobe [[id number]] Collected:  05/10/18 1411    Order Status:  Completed Specimen:  Ascites from Ascites Updated:  05/17/18 0741     Anaerobic Culture No anaerobes isolated    AFB Culture & Smear [[id number]] Collected:  03/14/18 1137    Order Status:  Completed Specimen:  Bronchial Wash from Amniotic Fluid Updated:  05/16/18 2127     AFB Culture & Smear No growth after 8 weeks.      AFB CULTURE STAIN No acid fast bacilli seen.    Aerobic culture [[id number]] Collected:  05/15/18 1518    Order Status:  Completed Specimen:  Ascites from Ascites Updated:  05/16/18 0713     Aerobic Bacterial Culture No growth    Gram stain [[id number]] Collected:  05/15/18 1518    Order Status:  Completed Specimen:  Ascites from Ascites Updated:  05/15/18 2004     Gram Stain Result No WBC's      No organisms seen    Aerobic culture [[id number]] Collected:  05/10/18 1411    Order Status:  Completed Specimen:  Ascites from Ascites Updated:  05/14/18 0916     Aerobic Bacterial Culture No growth          I have reviewed all pertinent labs within the past 24 hours.    Estimated Creatinine Clearance: 35.3 mL/min (A) (based on SCr of 2.5 mg/dL (H)).    Diagnostic Results:  CXR: No results found in the last 24 hours.        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 Most recently has had  ongoing issues with his lungs, had gotten 2 thoracenteses, after which he underwent VATS 01/19/18 followed by pleurex catheter placement and effusion drainage 01/11/18, subsequently had it removed 02/07/18 after drainage had decreased despite multiple attempts to drain it. Has been having significant coughing fits that result in him being near-syncopal at times, although difficult to say if he has passed out or not- patient most recently was admitted to the hospital for increased AGUILAR, coughing and pre-syncope 02/11-02/14/18 at [facility].   Patient was started on antibiotics for suspected bacterial infection, with some diarrhea, bronchitis, and possible pneumonia. Ct chest showed some pleural fluid collection and after talking with Dr. James, we arranged for him to receive a diagnostic tap with IR, from which the fluid collection demonstrated no growth or significant findings at all really. Cell counts do not appear to have been sent but will recheck. Patient states since his hospital stay, yesterday had a significant coughing fit again, after which he nearly passed out, is being seen in clinic today for this. Denies any cardiopulmonary complaints, no leg swelling, has some abdominal swelling, which is moderate for him. Denies significant shortness of breath with mild exertion, had 6MWT yesterday to see if he qualified for home O2, and his O2 sats actually improved after recovery from where he started initially. He and his wife admit he is in bed most days, not very active.     Mr. Crocker presented to the ED 3/11/18 with approximately 3 weeks of worsening diarrhea and 2-3 days of sinus pressure, drainage, and worsened cough.  He notes that he had a coughing fit last night and passed out, coughed throughout most of the night.  This also happened on 2/25/18.  He last saw Dr Ferreira in clinic on 2/20/18 where he was taken off myfortic and switched to cellcept (he hadn't been on cellcept because of leukopenia when they  tried post-transplant).  Though his diarrhea had improved after his last discharge, he states it has increased now to 15x/day, clear/yellow/green, no fevers, no exacerbating foods per say.  He was taken back off the cellcept and placed back on myfortic this past week and has not noticed immediate change in diarrhea. He also reports his baseline coughing fits havent improved and are minimally responsive to tessalon pearls.  Came on last night, he lost consciousness during a fit once which is relatively normal for him, and had two more during HPI.  He notes no sick contacts but does state he's had some URI symptoms as above.  His baseline vitals are usually -120 and BP 90s/60s-110s/70s.  He reports a history of intermittent diarrhea - did have Cdiff many years ago but this smells different.  No abdominal pain, no nausea, no vomiting.  No blood in diarrhea.  Appears last c-scope in 2015 with no evidence of infection or inflammatory processes.  He does report IBS which is different that this.       * Acute respiratory failure with hypoxia    - Resolved. Now with trach decannulation  - Etiology = RSV   - Stoma care / education provided by pulmonology   - Continue pulmonary toilet to help with respiratory mechanics   - Appreciate PT/OT/Wound care.  - Finishing Mixofloxacin today   - D/C pulse steroids on 5/14. Will discuss with pharmacy if current 5mg is necessary for OH  - off oxygen supplementation  - repeat CXR shows improved aeration  - HD as scheduled        Debility    - PT/OT/SLP daily.   - Patient needs PT/OT, HD in, In patient setting facility due to prolonged hospitalization with resultant debility  - continue oral intake and protein supplementation  - [facility]. D/C planned for tomorrow        Heart transplanted    - TTE 4/24/18 showed normal graft function but has known history of restrictive CM post transplant.  - Continue Prednisone 5mg daily and Cellcept 250mg BID   -  Tacro level 10.9 today from 14.9 yesterday.  - ContinueTacrolimus 3/3 as current. Goal is 6-10        Other ascites    - s/p paracentesis yesterday with removal of 3.5L  -  No growth so far.        Leukocytosis    - back to baseline today. Possibly a lab error yesterday        Abdominal distention    - Repeat paracentesis requested for Tuesday 5/15 (Spoke with IR)   - Albumin if >5Ls removed   - paracentesis with significant fluid removal, about 3.5L on 5/15  - GS and cultures shows no growth so far                  Alteration in skin integrity    - Dry gangrene on toes. No surgical intervention indicated.   - Wound care following. Local care only at this point.   - podiatry continuing to fu during stay        ESRD (end stage renal disease)    - Appreciate Nephrology recs.   - Continue midodrine  - HD per nephrology M BURKE ZARATE         Restrictive cardiomyopathy    - Requested more fluid removal with dialysis         Type 1 diabetes mellitus with stage 3 chronic kidney disease    - Appreciate Endocrine's recs  - continue basal bolus with titration as needed   - BS labile due to poor oral intake.        Hypothyroidism due to Hashimoto's thyroiditis    - Appreciate Endocrine's recs  - Switched to PO Levothyroxine 137mcg daily        Anemia    - Nephrology is managing ProCrit   - Hgb stable         Anxiety    - Increased Lexapro to 20mg daily  - Continue seroquel 50 QHS   - Continue .5mg xanax PRN Q8H   - Appreciate psychiatry input.           Discussed plan of care Dr. ranjit Walsh MD  Heart Transplant  [facility]

## 2018-05-17 NOTE — ASSESSMENT & PLAN NOTE
- TTE 4/24/18 showed normal graft function but has known history of restrictive CM post transplant.  - Continue Prednisone 5mg daily and Cellcept 250mg BID   - Tacro level 10.9 today from 14.9 yesterday.  - ContinueTacrolimus 3/3 as current. Goal is 6-10

## 2018-05-17 NOTE — ASSESSMENT & PLAN NOTE
- multiple toe ulcers  - secondary to HIT versus embolic events during rx  - Podiatry not inclined for amputation.   - to continue current skin ointment/regimen per recs

## 2018-05-17 NOTE — PLAN OF CARE
Problem: Physical Therapy Goal  Goal: Physical Therapy Goal  Goals to be met by: 18     Patient will increase functional independence with mobility by performin. Supine to sit with Moderate Assistance.  2. Sit to supine with Moderate Assistance.  3. Rolling to Left and Right with Minimal Assistance.   4. Sit to stand transfer with Moderate Assistance.  5. Bed to chair transfer with Maximum Assistance.  6. Gait  x 5 feet with Minimal Assistance.   7.  Pt to perf and be compliant with LE HEP to improve vascularity and mm strength.            Outcome: Ongoing (interventions implemented as appropriate)  Patient shows slow but consistent progress, but some days due to medical condition his participation is limited.

## 2018-05-17 NOTE — ASSESSMENT & PLAN NOTE
- Resolved. Now with trach decannulation  - Etiology = RSV   - Stoma care / education provided by pulmonology   - Continue pulmonary toilet to help with respiratory mechanics   - Appreciate PT/OT/Wound care.  - Finishing Mixofloxacin today   - D/C pulse steroids on 5/14. Will discuss with pharmacy if current 5mg is necessary for OH  - off oxygen supplementation  - repeat CXR shows improved aeration  - HD as scheduled

## 2018-05-17 NOTE — PLAN OF CARE
Problem: Patient Care Overview  Goal: Plan of Care Review  Outcome: Ongoing (interventions implemented as appropriate)  Pt is AAOx4, totally dependent, and VSS. Pt denies pain. Barrier cream applied buttock. Heal dressing bilaterally changed and iodine applied to affected necrotic toes. Pt's brother at bedside. Blood glucose monitoring performed and treated as ordered. Telemetry monitoring in place. Pt's bed in lowest position, call bell within reach, and nonskid socks on.

## 2018-05-17 NOTE — PT/OT/SLP PROGRESS
Occupational Therapy   Treatment    Name: Lv Crocker  MRN: [id number]  Admitting Diagnosis:  Acute respiratory failure with hypoxia  43 Days Post-Op    Recommendations:     Discharge Recommendations: rehabilitation facility  Discharge Equipment Recommendations:   (tbd)  Barriers to discharge:  Inaccessible home environment, Decreased caregiver support    Subjective     Communicated with: Nsg prior to session. Cc with PT  Pain/Comfort:  · Pain Rating 1: 0/10    Patients cultural, spiritual, Jewish conflicts given the current situation: none    Objective:     Patient found with: telemetry, pressure relief boots    General Precautions: Standard, fall   Orthopedic Precautions:RLE weight bearing as tolerated, LLE weight bearing as tolerated   Braces:       Occupational Performance:    Bed Mobility:    · Sup to sit with max assist       Functional Mobility/Transfers:  · Sit to stand x 3 reps, x 25 sec. Each time, max assist x 2, blocking LE's and support at hips for upright posture in standing  · Functional Mobility: able to take ~2-3 steps to medi chair, max x 2, scooting back in chair with max assist x 2  · EOB x ~25 min, left up in medi chair     Activities of Daily Living:  · Able to wipe face and feed self finger foods with LUE  · Dressing with gown sitting EOB with max assist, CGA to min assist with balance during task    Patient left up in medi chair with call button in reach    AMPA 6 Click:  [facility] Total Score: 12    Treatment & Education:  Pt performed reaching tasks for balance/core strength sitting EOB, performed above activities and transferred to medi chair, educated on progress, plan of care, exercises  Education:    Assessment:     Lv Crocker is a 42 y.o. male with a medical diagnosis of Acute respiratory failure with hypoxia.  He presents with increasing strength, mobility and ADL's.  Performance deficits affecting function are weakness, impaired endurance, impaired self care  skills, impaired functional mobilty, gait instability, impaired balance, decreased lower extremity function, decreased upper extremity function, decreased coordination, decreased ROM, impaired cardiopulmonary response to activity, impaired joint extensibility.      Rehab Prognosis:  good; patient would benefit from acute skilled OT services to address these deficits and reach maximum level of function.       Plan:     Patient to be seen 5 x/week to address the above listed problems via self-care/home management, therapeutic activities, therapeutic exercises  · Plan of Care Expires: 05/31/18  · Plan of Care Reviewed with: patient    This Plan of care has been discussed with the patient who was involved in its development and understands and is in agreement with the identified goals and treatment plan    GOALS:    Occupational Therapy Goals        Problem: Occupational Therapy Goal    Goal Priority Disciplines Outcome Interventions   Occupational Therapy Goal     OT, PT/OT Ongoing (interventions implemented as appropriate)    Description:  Goals to be met by: 5/8/18 goal extended to be met by: 5/22/18    Pt will follow 75% of motor commands with <2 verbal cues for repetition of task to maximize engagement in grooming task.--MET  Pt will complete feeding with mod A.   Pt will complete with HOB slightly elevated to seated at EOB with mod A in prep for seated grooming task.  Pt will engage in BUE exercises in orders to increase strength to 3+/5 in BUE's to increase engagement in seated grooming task  Pt will sit at EOB for approx 10 minutes with mod A and unilateral UE support to complete grooming task - Progressing   Pt will complete sit>stand from EOB with bed in lowest position with mod A in prep for [place] [facility] [place]                         Time Tracking:     OT Date of Treatment: 05/17/18  OT Start Time: 1011  OT Stop Time: 1041  OT Total Time (min): 30 min    Billable Minutes:Self Care/Home Management 10  min  Therapeutic Activity 20 min      Delia To OT  5/17/2018

## 2018-05-17 NOTE — PLAN OF CARE
Problem: Occupational Therapy Goal  Goal: Occupational Therapy Goal  Goals to be met by: 5/8/18 goal extended to be met by: 5/22/18    Pt will follow 75% of motor commands with <2 verbal cues for repetition of task to maximize engagement in grooming task.--MET  Pt will complete feeding with mod A.   Pt will complete with HOB slightly elevated to seated at EOB with mod A in prep for seated grooming task.  Pt will engage in BUE exercises in orders to increase strength to 3+/5 in BUE's to increase engagement in seated grooming task  Pt will sit at EOB for approx 10 minutes with mod A and unilateral UE support to complete grooming task - Progressing   Pt will complete sit>stand from EOB with bed in lowest position with mod A in prep for transfer to [facility] [place]        Outcome: Ongoing (interventions implemented as appropriate)  con't with plan of care  Delia To, OTR

## 2018-05-17 NOTE — PT/OT/SLP PROGRESS
Physical Therapy Treatment    Patient Name:  Lv Crocker   MRN:  [id number]    Recommendations:     Discharge Recommendations:  rehabilitation facility   Discharge Equipment Recommendations:  (TBD pending progress)   Barriers to discharge: Inaccessible home and Decreased caregiver support    Assessment:     Lv Crocker is a 42 y.o. male admitted with a medical diagnosis of Acute respiratory failure with hypoxia.  He presents with the following impairments/functional limitations:  weakness, impaired endurance, impaired self care skills, impaired functional mobilty, gait instability, impaired balance, decreased upper extremity function, decreased lower extremity function, pain, decreased ROM, impaired coordination, edema, impaired cardiopulmonary response to activity, impaired joint extensibility . Patient with min. Dizziness immediately after transferring from supine to sit, but improved as treatment progressed. Patient was able to take a couple of steps when transferring from bed to cardiac chair.    Rehab Prognosis:  fair; patient would benefit from acute skilled PT services to address these deficits and reach maximum level of function.      Recent Surgery: Procedure(s) (LRB):  INSERTION-CATHETER-PERM-A-CATH (Left)  INSERTION-TUBE-GASTROSTOMY (N/A) 43 Days Post-Op    Plan:     During this hospitalization, patient to be seen 5 x/week to address the above listed problems via gait training, therapeutic activities, therapeutic exercises, neuromuscular re-education  · Plan of Care Expires:  06/01/18   Plan of Care Reviewed with: patient, sibling    Subjective     Communicated with RN prior to session.  Patient found in supine upon PT entry to room, agreeable to treatment.      Chief Complaint: weakness and dizziness  Patient comments/goals: to get stronger  Pain/Comfort:  · Pain Rating 1: 0/10  · Location - Side 1: Bilateral  · Location - Orientation 1: generalized  · Location 1: knee  · Pain  Addressed 1: Reposition, Distraction, Cessation of Activity  · Pain Rating Post-Intervention 1: 2/10    Patients cultural, spiritual, Scientologist conflicts given the current situation: None stated    Objective:     Patient found with: pressure relief boots, telemetry     General Precautions: Standard, fall, aspiration   Orthopedic Precautions:RLE weight bearing as tolerated, LLE weight bearing as tolerated   Braces:  (B LE [employer])     Functional Mobility:  · Bed Mobility:     · Rolling Left:  maximal assistance  · Scooting: maximal assistance  · Supine to Sit: maximal assistance  · Transfers:     · Sit to Stand:  total assistance with no AD  · Bed to Chair: maximal assistance with  no AD  using  Stand Pivot  · Gait: 3 small steps from bed to cardiac chair  · Balance: fair in sitting and poor in standing.      AM-PAC 6 CLICK MOBILITY  Turning over in bed (including adjusting bedclothes, sheets and blankets)?: 2  Sitting down on and standing up from a chair with arms (e.g., wheelchair, bedside commode, etc.): 2  Moving from lying on back to sitting on the side of the bed?: 2  Moving to and from a bed to a chair (including a wheelchair)?: 2  Need to walk in hospital room?: 1  Climbing 3-5 steps with a railing?: 1  Total Score: 10       Therapeutic Activities and Exercises:   Removed pressure relief boots. Patient sat at EOB x 20 minutes and participated doing self care activities with O.T. With CGA to min assistance for postural control. Static standing balance activity 3x 25 secs with B Hand held assistance with max. Assistance. . Patient was then transferred to cardiac chair.    Patient left up in Cardiac Chair with all lines intact, call button in reach and RN notified..    GOALS:    Physical Therapy Goals        Problem: Physical Therapy Goal    Goal Priority Disciplines Outcome Goal Variances Interventions   Physical Therapy Goal     PT/OT, PT Ongoing (interventions implemented as appropriate)     Description:   Goals to be met by: 18     Patient will increase functional independence with mobility by performin. Supine to sit with Moderate Assistance.  2. Sit to supine with Moderate Assistance.  3. Rolling to Left and Right with Minimal Assistance.   4. Sit to stand transfer with Moderate Assistance.  5. Bed to chair transfer with Maximum Assistance.  6. Gait  x 5 feet with Minimal Assistance.   7.  Pt to perf and be compliant with LE HEP to improve vascularity and mm strength.                             Time Tracking:     PT Received On: 18  PT Start Time: 1012     PT Stop Time: 1042  PT Total Time (min): 30 min     Billable Minutes: Therapeutic Activity 22 and Neuromuscular Re-education 8    Treatment Type: Treatment  PT/PTA: PTA     PTA Visit Number: Gee Hoang, MAURA  2018

## 2018-05-17 NOTE — PLAN OF CARE
AAO x 4. VSS, afebrile, SpO2>95% on 2L NC. Telemetry monitoring-SR. BG monitoring-no coverage indicated. Pt did not eat dinner. Marinol started. No N/V overnight. Trach site with gauze dressing CDI. Old PEG site healing. Fall precautions maintained and pt repositioned Q2H. POC reviewed with pt. See flowsheet for assessment findings. Will continue to monitor.

## 2018-05-17 NOTE — ASSESSMENT & PLAN NOTE
- PT/OT/SLP daily.   - Patient needs PT/OT, HD in, In patient setting facility due to prolonged hospitalization with resultant debility  - continue oral intake and protein supplementation  - [facility] D/C planned for tomorrow

## 2018-05-17 NOTE — SUBJECTIVE & OBJECTIVE
Interval History:    No acute overnight issues. Good appetite this morning. Off oxygen supplementation with SPO2 in normal range. Repeat CXR shows significant clearing to the left lung. [facility] with plan to d/c tomorrow. Undergoing in patient PT/OT.    Continuous Infusions:  Scheduled Meds:   acetaminophen  650 mg Oral TID    cetirizine  5 mg Oral Daily    docusate sodium  100 mg Oral BID    dronabinol  2.5 mg Oral BID    epoetin jose miguel (PROCRIT) injection  100 Units/kg (Dosing Weight) Intravenous Every Mon, Wed, Fri    escitalopram oxalate  20 mg Oral Daily    famotidine  20 mg Oral QHS    guaiFENesin  1,200 mg Oral BID    heparin (porcine)  5,000 Units Subcutaneous Q12H    insulin aspart U-100  4-8 Units Subcutaneous TIDWM    insulin detemir U-100  8 Units Subcutaneous BID    levalbuterol  0.63 mg Nebulization Q4H While awake    levothyroxine  137 mcg Oral Before breakfast    midodrine  10 mg Oral TID    mycophenolate  250 mg Oral BID    polyethylene glycol  17 g Oral BID    predniSONE  5 mg Oral Daily    QUEtiapine  50 mg Oral QHS    tacrolimus  3 mg Oral BID     PRN Meds:sodium chloride 0.9%, sodium chloride 0.9%, sodium chloride 0.9%, albuterol-ipratropium, ALPRAZolam, benzonatate, calcium carbonate, dextrose 50%, dextrose 50%, glucagon (human recombinant), glucose, glucose, heparin (porcine), insulin aspart U-100, midodrine, ondansetron, oxyCODONE, promethazine (PHENERGAN) IVPB    Review of patient's allergies indicates:   Allergen Reactions    No known drug allergies      Objective:     Vital Signs (Most Recent):  Temp: 98.1 °F (36.7 °C) (05/17/18 1238)  Pulse: (!) 119 (05/17/18 1325)  Resp: 18 (05/17/18 1325)  BP: 120/79 (05/17/18 1238)  SpO2: 98 % (05/17/18 1325) Vital Signs (24h Range):  Temp:  [97.8 °F (36.6 °C)-98.7 °F (37.1 °C)] 98.1 °F (36.7 °C)  Pulse:  [101-121] 119  Resp:  [16-18] 18  SpO2:  [92 %-99 %] 98 %  BP: (108-132)/(61-80)  120/79     No data found.    Body mass index is 27.31 kg/m².      Intake/Output Summary (Last 24 hours) at 05/17/18 1338  Last data filed at 05/17/18 0845   Gross per 24 hour   Intake              530 ml   Output                0 ml   Net              530 ml       Hemodynamic Parameters:       Telemetry:     Physical Exam    Physical Exam  Constitutional: He is oriented to person, place, and time.   Neck: No JVD present.   Decannulated trach. Dressing in place.    Cardiovascular: Regular rhythm and normal heart sounds.    Tachycardia noted.   Pulmonary/Chest: Effort normal and breath sounds normal.   Abdominal: Soft. Bowel sounds are normal.   Musculoskeletal: He exhibits no edema or tenderness.   Neurological: He is alert and oriented to person, place, and time.   Skin: Skin is warm and dry. Multiple toe necrotic lesion   Psychiatric: better mood and affect this morning. More engaging  Nursing note and vitals reviewed.  Significant Labs:  CBC:    Recent Labs  Lab 05/15/18  0400 05/16/18  0400 05/17/18  0528   WBC 7.60 4.33 5.01   RBC 2.93* 2.98* 3.14*   HGB 8.7* 8.6* 9.3*   HCT 27.8* 28.4* 30.6*    214 249   MCV 95 95 98   MCH 29.7 28.9 29.6   MCHC 31.3* 30.3* 30.4*     BNP:  No results for input(s): BNP in the last 168 hours.    Invalid input(s): BNPTRIAGELBLO  CMP:    Recent Labs  Lab 05/15/18  0400 05/16/18  0623 05/17/18  0527    80 134*   CALCIUM 8.5* 8.7 8.8   ALBUMIN 2.3* 2.4* 2.3*   PROT 5.9* 5.8* 5.6*   * 131* 135*   K 3.2* 4.0 4.7   CO2 24 24 23   CL 93* 93* 101   BUN 28* 37* 20   CREATININE 2.6* 3.5* 2.5*   ALKPHOS 132 126 131   ALT 7* 6* 9*   AST 10 11 14   BILITOT 0.4 0.4 0.4      Coagulation:   No results for input(s): PT, INR, APTT in the last 168 hours.  LDH:  No results for input(s): LDH in the last 72 hours.  Microbiology:  Microbiology Results (last 7 days)     Procedure Component Value Units Date/Time    Culture, Anaerobe [[id number]] Collected:  05/15/18 1518    Order Status:   Completed Specimen:  Ascites from Ascites Updated:  05/17/18 1029     Anaerobic Culture Culture in progress    Culture, Anaerobe [[id number]] Collected:  05/10/18 1411    Order Status:  Completed Specimen:  Ascites from Ascites Updated:  05/17/18 0741     Anaerobic Culture No anaerobes isolated    AFB Culture & Smear [[id number]] Collected:  03/14/18 1137    Order Status:  Completed Specimen:  Bronchial Wash from Amniotic Fluid Updated:  05/16/18 2127     AFB Culture & Smear No growth after 8 weeks.      AFB CULTURE STAIN No acid fast bacilli seen.    Aerobic culture [[id number]] Collected:  05/15/18 1518    Order Status:  Completed Specimen:  Ascites from Ascites Updated:  05/16/18 0713     Aerobic Bacterial Culture No growth    Gram stain [[id number]] Collected:  05/15/18 1518    Order Status:  Completed Specimen:  Ascites from Ascites Updated:  05/15/18 2004     Gram Stain Result No WBC's      No organisms seen    Aerobic culture [[id number]] Collected:  05/10/18 1411    Order Status:  Completed Specimen:  Ascites from Ascites Updated:  05/14/18 0916     Aerobic Bacterial Culture No growth          I have reviewed all pertinent labs within the past 24 hours.    Estimated Creatinine Clearance: 35.3 mL/min (A) (based on SCr of 2.5 mg/dL (H)).    Diagnostic Results:  CXR: No results found in the last 24 hours.

## 2018-05-18 NOTE — PLAN OF CARE
[facility]    FACILITY TRANSFER ORDERS      Patient Name: Lv Crocker  YOB: 1975    PCP: Philippe Mohr MD   PCP Address: [address] [place] [address] / [place] [id number]  PCP Phone Number: [phone]  PCP Fax: [phone]    Encounter Date: 05/18/2018    [facility]    Vital Signs:  Routine    Diagnoses:   Active Hospital Problems    Diagnosis  POA    *Acute respiratory failure with hypoxia [J96.01]  No     Priority: 1 - High    Debility [R53.81]  Yes     Priority: 2     Heart transplanted [Z94.1]  Not Applicable     Priority: 4     Other ascites [R18.8]  Yes     Priority: 6     Leukocytosis [D72.829]  No     Priority: 10     CACHORRO (acute kidney injury) with ATN superimposed CKD 3  [N17.9]  No             Abdominal distention [R14.0]  Yes    Anxiety disorder [F41.9]  Unknown    Chronic insomnia [F51.04]  Unknown    Depression [F32.9]  Unknown    Gangrene [I96]  No    Severe malnutrition [E43]  No    Decubitus ulcer with suspected deep tissue injury [L89.90]  Yes    Diarrhea [R19.7]  Yes    Current chronic use of systemic steroids [Z79.52]  Not Applicable    Alteration in skin integrity [R23.9]  Yes    Acute encephalopathy [G93.40]  Yes    ESRD (end stage renal disease) [N18.6]  Yes    Potential impairment of skin integrity [Z91.89]  Not Applicable    Hypoxia [R09.02]  Yes    Pneumonia [J18.9]  Yes    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]  Yes    Iron deficiency anemia [D50.9]  Yes     IMO LOAD Q4      Type 1 diabetes mellitus with stage 3 chronic kidney disease [E10.22, N18.3]  Yes    Hypothyroidism due to Hashimoto's thyroiditis [E03.8, E06.3]  Yes    Anemia [D64.9]  Yes    Anxiety [F41.9]  Yes      Resolved Hospital Problems    Diagnosis Date Resolved POA    Gastroparesis [K31.84] 05/03/2018 Yes     Priority: 2     Shock, unspecified [R57.9] 03/31/2018 No     Priority: 3     DIC (disseminated intravascular coagulation) [D65] 03/29/2018 No      Priority: 4     On enteral nutrition [Z78.9] 05/10/2018 No    Hypotension [I95.9] 03/30/2018 No    Ileus [K56.7] 03/29/2018 No    Pancytopenia [D61.818] 03/29/2018 No    Thrombocytopenia [D69.6] 03/29/2018 Unknown    Cough [R05] 03/14/2018 Yes    Cough [R05] 03/18/2018 Yes    Diarrhea [R19.7] 03/18/2018 Yes    Fever [R50.9] 05/08/2018 Yes       Allergies:  Review of patient's allergies indicates:   Allergen Reactions    No known drug allergies        Diet: diabetic diet: 2000 calorie    Activities: Activity as tolerated    Nursing: per facility Protocol     Labs: CBC and BMP weekly. Tacrolimus level Daily     CONSULTS:    Physical Therapy to evaluate and treat.  and Occupational Therapy to evaluate and treat.    MISCELLANEOUS CARE:  Routine Skin for Bedridden Patients: Apply moisture barrier cream to all skin folds and wet areas in perineal area daily and after baths and all bowel movements. and Diabetes Care:   SN to perform and educate Diabetic management with blood glucose monitoring:    WOUND CARE ORDERS  Yes: Pressure Ulcer(s) Stage II :   Location: sacral  Apply Miconzazole:  2% cream                       Frequency:  Twice Daily                             If incontinent of stool or urine, apply thin layer Barrier cream                   twice daily and PRN to wound         Pressure relief measure:  for pressure redistribution          Foot Ulcer:  Location: heel- boots    Dry Eschar: Pain with betadine twice daily.   and Vascular Wound:  Location: toes    Consult ET nurse        Apply the following to wound:   Collagenase (Santyl) daily, cover with telfa, wrap with with kerlix dressing    Medications: Review discharge medications with patient and family and provide education.      Current Discharge Medication List      START taking these medications    Details   albuterol-ipratropium (DUO-NEB) 2.5 mg-0.5 mg/3 mL nebulizer solution Take 3 mLs by nebulization every 4 (four) hours as needed for  Wheezing. Rescue  Qty: 1 Box, Refills: 0      ALPRAZolam (XANAX) 0.5 MG tablet Take 1 tablet (0.5 mg total) by mouth every 6 (six) hours as needed for Anxiety.  Qty: 10 tablet, Refills: 0      benzonatate (TESSALON) 100 MG capsule Take 1 capsule (100 mg total) by mouth 3 (three) times daily as needed for Cough.  Qty: 30 capsule, Refills: 0      cetirizine (ZYRTEC) 5 MG tablet Take 1 tablet (5 mg total) by mouth once daily.  Qty: 30 tablet, Refills: 0      dronabinol (MARINOL) 2.5 MG capsule Take 1 capsule (2.5 mg total) by mouth 2 (two) times daily.  Qty: 30 capsule, Refills: 0      levalbuterol (XOPENEX) 0.63 mg/3 mL nebulizer solution Take 3 mLs (0.63 mg total) by nebulization every 4 (four) hours while awake. Rescue  Qty: 4 Box, Refills: 0      !! midodrine (PROAMATINE) 10 MG tablet Take 1 tablet (10 mg total) by mouth 3 (three) times daily with meals.  Qty: 90 tablet, Refills: 11      !! midodrine (PROAMATINE) 5 MG Tab Take 3 tablets (15 mg total) by mouth as needed (30 minutes prior to HD).  Qty: 30 tablet, Refills: 0      mycophenolate (CELLCEPT) 250 mg Cap Take 1 capsule (250 mg total) by mouth 2 (two) times daily.  Qty: 60 capsule, Refills: 11      oxyCODONE (ROXICODONE) 5 MG immediate release tablet Take 1 tablet (5 mg total) by mouth every 6 (six) hours as needed.  Qty: 15 tablet, Refills: 0      polyethylene glycol (GLYCOLAX) 17 gram PwPk Take 17 g by mouth 2 (two) times daily.  Qty: 10 packet, Refills: 0      QUEtiapine (SEROQUEL) 50 MG tablet Take 1 tablet (50 mg total) by mouth every evening.  Qty: 30 tablet, Refills: 11       !! - Potential duplicate medications found. Please discuss with provider.      CONTINUE these medications which have CHANGED    Details   aspirin (ECOTRIN) 81 MG EC tablet Take 1 tablet (81 mg total) by mouth once daily.  Qty: 30 tablet, Refills: 11      escitalopram oxalate (LEXAPRO) 20 MG tablet Take 1 tablet (20 mg total) by mouth once daily.  Qty: 30 tablet, Refills: 11     "Associated Diagnoses: Status post heart transplant      gabapentin (NEURONTIN) 300 MG capsule Take 3 capsules (900 mg total) by mouth 3 (three) times daily.  Qty: 90 capsule, Refills: 1    Associated Diagnoses: Status post heart transplant; Acquired hypothyroidism      insulin aspart U-100 (NOVOLOG) 100 unit/mL InPn pen Inject 4 Units into the skin 3 (three) times daily.  Qty: 3.6 mL, Refills: 11      insulin detemir U-100 (LEVEMIR FLEXTOUCH) 100 unit/mL (3 mL) SubQ InPn pen Inject 8 Units into the skin 2 (two) times daily.  Qty: 2 Box, Refills: 1      levothyroxine (SYNTHROID) 137 MCG Tab tablet Take 1 tablet (137 mcg total) by mouth before breakfast.  Qty: 30 tablet, Refills: 11      magnesium oxide (MAG-OX) 400 mg tablet Take 1 tablet (400 mg total) by mouth once daily.  Qty: 30 tablet, Refills: 11      ondansetron (ZOFRAN-ODT) 4 MG TbDL Take 2 tablets (8 mg total) by mouth every 8 (eight) hours as needed (nausea).  Qty: 15 tablet, Refills: 0    Associated Diagnoses: Nausea      pen needle, diabetic 32 gauge x 5/32" Ndle Uses 5 pen needles a day  Qty: 200 each, Refills: 12      pravastatin (PRAVACHOL) 40 MG tablet Take 1 tablet (40 mg total) by mouth once daily.  Qty: 30 tablet, Refills: 0      predniSONE (DELTASONE) 2.5 MG tablet Take 1 tablet (2.5 mg total) by mouth once daily. S/P Heart Transplant 11/18/2014 ICD-10 Z94.1  Qty: 30 tablet, Refills: 11    Associated Diagnoses: Heart transplanted; Essential hypertension; Chronic kidney disease (CKD), stage III (moderate); Immunosuppression; Status post heart transplant; Long-term use of immunosuppressant medication      tacrolimus (PROGRAF) 1 MG Cap Taked 3mg orally in the morning and 3mg orally in the evening. S/p heart transplant  11/18/2014  ICD-10 Z94.1  Qty: 60 capsule, Refills: 0    Associated Diagnoses: Status post heart transplant; Long-term use of immunosuppressant medication; Immunosuppression      tamsulosin (FLOMAX) 0.4 mg Cp24 TAKE 2 CAPSULES(0.8 MG) " BY MOUTH EVERY EVENING  Qty: 60 capsule, Refills: 11    Associated Diagnoses: Heart transplanted; Essential hypertension; Chronic kidney disease (CKD), stage III (moderate); Immunosuppression         CONTINUE these medications which have NOT CHANGED    Details   lancets Misc 1 Device by Misc.(Non-Drug; Combo Route) route 4 (four) times daily with meals and nightly.  Qty: 100 each, Refills: 5    Comments: ON HOLD, patient will request fill [facility]      pantoprazole (PROTONIX) 40 MG tablet TAKE ONE TABLET BY MOUTH EVERY DAY  Qty: 30 tablet, Refills: 11         STOP taking these medications       mycophenolate (MYFORTIC) 360 MG TbEC Comments:   Reason for Stopping:         nortriptyline (PAMELOR) 25 MG capsule Comments:   Reason for Stopping:         torsemide (DEMADEX) 20 MG Tab Comments:   Reason for Stopping:                Plan of care discussed and agreed on by team including Social workers and Dr. Jason attending    _________________________________  Josiah Walsh MD  05/18/2018

## 2018-05-18 NOTE — SUBJECTIVE & OBJECTIVE
"Interval HPI:   Overnight events:  AF, tachycardic, with low normal BP    Patient experienced hypoglycemia yesterday.  Endocrinology was not notified.     Patient received 24g and subsequent BG normalized.  Patient's appetite is not stable, poor PO intake yesterday, despite BID levemir dosing.    Currently undergoing HD.    /77   Pulse 108   Temp 98.2 °F (36.8 °C) (Oral)   Resp 18   Ht 5' 7" (1.702 m)   Wt 79.8 kg (175 lb 14.8 oz)   SpO2 98%   BMI 27.55 kg/m²     Labs Reviewed and Include      Recent Labs  Lab 05/18/18  0342   *   CALCIUM 8.4*   ALBUMIN 2.2*   PROT 5.2*   *   K 4.2   CO2 20*      BUN 26*   CREATININE 3.2*   ALKPHOS 126   ALT 10   AST 16   BILITOT 0.3     Lab Results   Component Value Date    WBC 5.46 05/18/2018    HGB 9.0 (L) 05/18/2018    HCT 29.9 (L) 05/18/2018    MCV 97 05/18/2018     05/18/2018     No results for input(s): TSH, FREET4 in the last 168 hours.  Lab Results   Component Value Date    HGBA1C 5.1 04/05/2018       Nutritional status:   Body mass index is 27.55 kg/m².  Lab Results   Component Value Date    ALBUMIN 2.2 (L) 05/18/2018    ALBUMIN 2.3 (L) 05/17/2018    ALBUMIN 2.4 (L) 05/16/2018     Lab Results   Component Value Date    PREALBUMIN 13 (L) 04/08/2018    PREALBUMIN 5 (L) 03/15/2018    PREALBUMIN 18 (L) 03/24/2015       Estimated Creatinine Clearance: 29.8 mL/min (A) (based on SCr of 3.2 mg/dL (H)).    Accu-Checks  Recent Labs      05/16/18   2015  05/17/18   0810  05/17/18   1240  05/17/18   1241  05/17/18   1309  05/17/18   1310  05/17/18   1348  05/17/18   1807  05/17/18   1958  05/17/18   2350   POCTGLUCOSE  198*  108  46*  54*  49*  45*  74  188*  235*  267*       Current Medications and/or Treatments Impacting Glycemic Control  Immunotherapy:  Immunosuppressants         Stop Route Frequency     tacrolimus capsule 3 mg      -- Oral 2 times daily     mycophenolate capsule 250 mg      -- Oral 2 times daily        Steroids:   Hormones     " Start     Stop Route Frequency Ordered    05/13/18 1400  predniSONE tablet 5 mg      -- Oral Daily 05/13/18 1357        Pressors:    Autonomic Drugs     Start     Stop Route Frequency Ordered    04/30/18 0705  midodrine tablet 15 mg      -- Oral As needed (PRN) 04/30/18 0707    04/25/18 0900  midodrine tablet 10 mg      -- Oral 3 times daily 04/25/18 0650        Hyperglycemia/Diabetes Medications: Antihyperglycemics     Start     Stop Route Frequency Ordered    05/18/18 2100  insulin detemir U-100 pen 5 Units      -- SubQ 2 times daily 05/18/18 0946    05/15/18 0945  insulin aspart U-100 pen 4-8 Units      -- SubQ 3 times daily with meals 05/15/18 0932    05/10/18 1044  insulin aspart U-100 pen 0-5 Units      -- SubQ Before meals & nightly PRN 05/10/18 0945

## 2018-05-18 NOTE — NURSING
Patient discharged to LTAC as ordered.  Left via wheelchair, accompanied by wheelchair van staff.  GAIL Midline left in place per receiving facility preference.  Prior to discharge, reviewed with patient's brother that patient is to be admitted to 4th floor of LTAC, room will be assigned when he arrives.  Patient's brother taking possession of all personal belongings.  Report was given to Magali at [facility] at 1310.

## 2018-05-18 NOTE — NURSING
Patient returned from HD - VS stable.  Orders noted for discharge to LTAC, waiting on info where/who to call for report & info for transportation.

## 2018-05-18 NOTE — PROGRESS NOTES
Mr. Lv Crocker is being discharged today following admission for evaluation of diarrhea and cough. His pMH is significant for OHT from 2014, DM, hypothyroidism, anemia, gerd, HL and anxiety.     He is being discharged to rehab with HD.  His immunosuppression consists of prograf 3mg twice daily (goal 6-8), MMF 250mg bid and prednisone 5mg orally daily. Of note, he had several elevated prograf levels during his hospital. Prograf administration was often delayed because of vomiting prior to HD.  His prograf dose was lowered from 5mg to 3mg.     Thank you.

## 2018-05-18 NOTE — NURSING
Report received from night nurse SAVANNA Cherry RN.  Patient is currently off unit for HD.  Assume care after transfer to [facility]19

## 2018-05-18 NOTE — ASSESSMENT & PLAN NOTE
- [facility]  PT/OT, HD  - continue oral intake and protein supplementation

## 2018-05-18 NOTE — PLAN OF CARE
Problem: Patient Care Overview  Goal: Plan of Care Review  Outcome: Ongoing (interventions implemented as appropriate)  AAOx3, afebrile, w/o c/o pain. Telemetry monitor in place (sinus tach 110-120s bpm).  BG monitored ACHS and tx per order. Pt being repositioned Q2 hrs. Heel boots in place.  Plan for dialysis today and d/c today.  Pt in lowest position, side rails up x2, non-skid foot wear in place, call light within reach, pt verbalized understanding to call RN when needed. Hand hygiene practiced per protocol. Will continue to monitor.

## 2018-05-18 NOTE — ASSESSMENT & PLAN NOTE
Titrate cautiously.  Caution with insulin stacking.  Avoid hypoglycemia.    HD per nephrology and primary

## 2018-05-18 NOTE — ASSESSMENT & PLAN NOTE
- TTE 4/24/18 showed normal graft function but has known history of restrictive CM post transplant.  - Continue Prednisone 5mg daily and Cellcept 250mg BID   - Tacro level 10.8   - ContinueTacrolimus 3/3 as current. Goal is 6-10  - to continue monitoring at rehab

## 2018-05-18 NOTE — PROGRESS NOTES
"[facility]  Endocrinology  Progress Note    Admit Date: 3/11/2018     Reason for Consult: abnormal TFTs    Surgical Procedure and Date: s/p heart transplant 2014     Diabetes diagnosis year: 5yo (T1DM)     Home Diabetes Medications:    Levemir 15u qHS  Novolog 4u AC     How often checking glucose at home? 4 times daily   BG readings on regimen: 150-200s  Hypoglycemia on the regimen?  Yes, rarely - treats appropriately (light snack, glucose tab)  Missed doses on regimen?  No        Diabetes Complications include:     Hyperglycemia, Hypoglycemia  and Diabetic chronic kidney disease          Complicating diabetes co morbidities:   N/A     HPI:   Patient is a 42 y.o. male with a diagnosis of T1DM, HTN, hypothyroidism, s/p heart transplant.  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 He required serial paracentesis and thoracentesis until he underwent a VATS with pleurX catheter placement on 1/11/2018 and removed 2/7/18.       Presented to hospital secondary to diarrhea and cough.  Upon initial labs, TSH was decreased (0.101) and free T4 1.33.  Endocrinology consulted to assist in management of these labs.  He was last seen in clinic by Kamala Vázquez NP 11/2017.   Previous hospitalization, endocrine consulted for same problem.  During that visit, recommended decreasing LT4 to 125mcg daily. Unfortunately, patient was discharged on previous dose of 150mcg daily.     Interval HPI:   Overnight events:  AF, tachycardic, with low normal BP    Patient experienced hypoglycemia yesterday, with hypoglycemic unawareness.  Endocrinology was not notified.     Patient received 24g and subsequent BG normalized.  Patient's appetite is not stable, poor PO intake yesterday, despite BID levemir dosing.    Currently undergoing HD.    /77   Pulse 108   Temp 98.2 °F (36.8 °C) (Oral)   Resp 18   Ht 5' 7" " (1.702 m)   Wt 79.8 kg (175 lb 14.8 oz)   SpO2 98%   BMI 27.55 kg/m²       Labs Reviewed and Include      Recent Labs  Lab 05/18/18  0342   *   CALCIUM 8.4*   ALBUMIN 2.2*   PROT 5.2*   *   K 4.2   CO2 20*      BUN 26*   CREATININE 3.2*   ALKPHOS 126   ALT 10   AST 16   BILITOT 0.3     Lab Results   Component Value Date    WBC 5.46 05/18/2018    HGB 9.0 (L) 05/18/2018    HCT 29.9 (L) 05/18/2018    MCV 97 05/18/2018     05/18/2018     No results for input(s): TSH, FREET4 in the last 168 hours.  Lab Results   Component Value Date    HGBA1C 5.1 04/05/2018       Nutritional status:   Body mass index is 27.55 kg/m².  Lab Results   Component Value Date    ALBUMIN 2.2 (L) 05/18/2018    ALBUMIN 2.3 (L) 05/17/2018    ALBUMIN 2.4 (L) 05/16/2018     Lab Results   Component Value Date    PREALBUMIN 13 (L) 04/08/2018    PREALBUMIN 5 (L) 03/15/2018    PREALBUMIN 18 (L) 03/24/2015       Estimated Creatinine Clearance: 29.8 mL/min (A) (based on SCr of 3.2 mg/dL (H)).    Accu-Checks  Recent Labs      05/16/18   2015  05/17/18   0810  05/17/18   1240  05/17/18   1241  05/17/18   1309  05/17/18   1310  05/17/18   1348  05/17/18   1807  05/17/18   1958  05/17/18   2350   POCTGLUCOSE  198*  108  46*  54*  49*  45*  74  188*  235*  267*       Current Medications and/or Treatments Impacting Glycemic Control  Immunotherapy:  Immunosuppressants         Stop Route Frequency     tacrolimus capsule 3 mg      -- Oral 2 times daily     mycophenolate capsule 250 mg      -- Oral 2 times daily        Steroids:   Hormones     Start     Stop Route Frequency Ordered    05/13/18 1400  predniSONE tablet 5 mg      -- Oral Daily 05/13/18 1357        Pressors:    Autonomic Drugs     Start     Stop Route Frequency Ordered    04/30/18 0705  midodrine tablet 15 mg      -- Oral As needed (PRN) 04/30/18 0707    04/25/18 0900  midodrine tablet 10 mg      -- Oral 3 times daily 04/25/18 0650        Hyperglycemia/Diabetes Medications:  "Antihyperglycemics     Start     Stop Route Frequency Ordered    05/18/18 2100  insulin detemir U-100 pen 5 Units      -- SubQ 2 times daily 05/18/18 0946    05/15/18 0945  insulin aspart U-100 pen 4-8 Units      -- SubQ 3 times daily with meals 05/15/18 0932    05/10/18 1044  insulin aspart U-100 pen 0-5 Units      -- SubQ Before meals & nightly PRN 05/10/18 0945          ASSESSMENT and PLAN    Type 1 diabetes mellitus with stage 3 chronic kidney disease    BG goal 140-180    Episode of hypoglycemia yesterday.  Levemir 16u with little to no PO intake.    Recommend decreasing levemir dose again (~30% decrease total)  Recommend Levemir 5u BID.  Recommend decreasing prandial to 3-7u Novolog according to PO% completion.  Continue low dose correction insulin.  BG monitoring ac/hs    Patient is T1DM.  Recommend not to hold Levemir doses.  If experiences hypoglycemia, please notify endocrinology.    Discussed need to avoid hypoglycemia with patient.  Patient verbalized "...lows are worse than highs."        Severe malnutrition      No longer on parental nutrition.  PO intake poor, and varies.  Can affect BG levels.        Current chronic use of systemic steroids    Can increase insulin requirement        ESRD (end stage renal disease)    Titrate cautiously.  Caution with insulin stacking.  Avoid hypoglycemia.    HD per nephrology and primary        Hypothyroidism due to Hashimoto's thyroiditis    Continue LT4 137 mcg/day   Recommend rechecking tsh in 4-6 weeks from previous testing (~6/2/18)        Heart transplanted    Per transplant  Avoid hypoglycemia  On PDN and prograf - could lead to prandial elevations and insulin resistance.            Deonna Larry MD  Endocrinology  [facility]IA, Betzaida Hurtado MD,  have personally taken the history and examined the patient and agree with the resident's note as stated above.    "

## 2018-05-18 NOTE — DISCHARGE SUMMARY
[facility]  Heart Transplant  Discharge Summary      Patient Name: Lv Crocker  MRN: [id number]  Admission Date: 3/11/2018  Hospital Length of Stay: 67 days  Discharge Date and Time: 05/18/2018 3:51 PM  Attending Physician: No att. 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 Most recently has had ongoing issues with his lungs, had gotten 2 thoracenteses, after which he underwent VATS 01/19/18 followed by pleurex catheter placement and effusion drainage 01/11/18, subsequently had it removed 02/07/18 after drainage had decreased despite multiple attempts to drain it. Has been having significant coughing fits that result in him being near-syncopal at times, although difficult to say if he has passed out or not- patient most recently was admitted to the hospital for increased AGUILAR, coughing and pre-syncope 02/11-02/14/18 at [facility] [place], [place].   Patient was started on antibiotics for suspected bacterial infection, with some diarrhea, bronchitis, and possible pneumonia. Ct chest showed some pleural fluid collection and after talking with Dr. James, we arranged for him to receive a diagnostic tap with IR, from which the fluid collection demonstrated no growth or significant findings at all really. Cell counts do not appear to have been sent but will recheck. Patient states since his hospital stay, yesterday had a significant coughing fit again, after which he nearly passed out, is being seen in clinic today for this. Denies any cardiopulmonary complaints, no leg swelling, has some abdominal swelling, which is moderate for him. Denies significant shortness of breath with mild exertion, had 6MWT yesterday to see if he qualified  for home O2, and his O2 sats actually improved after recovery from where he started initially. He and his wife admit he is in bed most days, not very active.     Mr. Crocker presented to the ED 3/11/18 with approximately 3 weeks of worsening diarrhea and 2-3 days of sinus pressure, drainage, and worsened cough.  He notes that he had a coughing fit last night and passed out, coughed throughout most of the night.  This also happened on 2/25/18.  He last saw Dr Ferreira in clinic on 2/20/18 where he was taken off myfortic and switched to cellcept (he hadn't been on cellcept because of leukopenia when they tried post-transplant).  Though his diarrhea had improved after his last discharge, he states it has increased now to 15x/day, clear/yellow/green, no fevers, no exacerbating foods per say.  He was taken back off the cellcept and placed back on myfortic this past week and has not noticed immediate change in diarrhea. He also reports his baseline coughing fits havent improved and are minimally responsive to tessalon pearls.  Came on last night, he lost consciousness during a fit once which is relatively normal for him, and had two more during HPI.  He notes no sick contacts but does state he's had some URI symptoms as above.  His baseline vitals are usually -120 and BP 90s/60s-110s/70s.  He reports a history of intermittent diarrhea - did have Cdiff many years ago but this smells different.  No abdominal pain, no nausea, no vomiting.  No blood in diarrhea.  Appears last c-scope in 2015 with no evidence of infection or inflammatory processes.  He does report IBS which is different that this.       Procedure(s) (LRB):  INSERTION-CATHETER-PERM-A-CATH (Left)  INSERTION-TUBE-GASTROSTOMY (N/A)     Hospital Course: 3/24  - more awake today but still failed extubation  - wife very apprehensive about possible trach  -3/24  - not making significant progress. Has failed SBP and pulmonary talked with family and patient about  possible trach.   - developed hypotension this morning with CVP at 5.  CRRT changed to nightly only. Will resume tomorrow. Infectious romero negative  - placed back on low dose Levophed   4/22  -tacrolimus level high (12.9) from 11, still having nausea and TF residuals.   - Will check KUB and lipase. Gi consult on hold for now  5/14  - Off IV abx, Trach decanulated and undergoing HD. Has persistent nausea and isolated leukocytosis  5/15  - Paracentesis today  5/16  -waiting insurance approval for SNF.   5/17  - no acute issues. Appetite is better    5/18  Patient has had long complicated stay for this admission which was secondary of RSV infection. Soon after admission, patient decompensated into respiratory failure and septic shock. He was intubated, started on pressors and broad spectrum abx including antifungal (posazonazole) and antiviral. He also also had a bronchoscopy that did not reveal any infectious agent. His RSV panel was positive for RSV. Patient continue to undergo CRRT but had labile BP that required occasional session skipping. He is also om midodrine started by nephrology.  He temporarily improved and was on vent wean trial when he gain developed septic shock, abx and pressors were again restarted. Hematology was consulted due to DIC and patient received FFP and cryoglobulin. During that time, he deloped peripheral LE digits ulcers thought to be secondary to embolic etiology. These ulcers are still present and were being managed conservatively by podiatry during stay. Recommendation is to continue betadine treatment at rehab. He failed extubation after improving from second episode of septic shock and had to have tracheostomy placed. This was later decannulated and currently the stoma is healing well without complication. He was fed through NG tube after PEG placement was discouraged secondary to present of ckd associated ascites and was able to transition to regular diet by D/C time. Patient had two  paracentesis done during stay. He had lingering complain of anxiety and persistent nausea as well as labile glucose. Endocrine ascribed his nausea to gastroparesis and tailored his insulin therapy as needed while pharmacy managed his heart transplant medication regime with adjustement dependent on the stage of infection. He developed prolonged hospitalization associated debility requiring in patient rehabilitation facility. Issues of insurance were addressed by  with final acceptance to select rehab. He will continue to have HD, PT/OT and rx management at rehab and fu at [facility] clinic as scheduled.    Consults         Status Ordering Provider     Inpatient consult to Cardiology  Once     Provider:  (Not yet assigned)    Completed YANIV VALENZUELA     Inpatient consult to Endocrinology  Once     Provider:  (Not yet assigned)    Completed SILVIANO BRINK     Inpatient consult to ENT  Once     Provider:  (Not yet assigned)    Completed SILVIANO BRINK     Inpatient consult to Gastroenterology  Once     Provider:  (Not yet assigned)    Completed SILVIANO BRINK     Inpatient consult to Gastroenterology  Once     Provider:  (Not yet assigned)    Completed ERNESTO ALSTON     Inpatient consult to General Surgery  Once     Provider:  (Not yet assigned)    Completed SILVIANO BRINK     Inpatient consult to General Surgery  Once     Provider:  (Not yet assigned)    Completed JULIA CHRISTINE     Inpatient consult to Hematology  Once     Provider:  (Not yet assigned)    Completed MARIBEL HOWELL     Inpatient consult to Hematology  Once     Provider:  (Not yet assigned)    Completed JULIA CHRISTINE     Inpatient consult to Infectious Diseases  Once     Provider:  (Not yet assigned)    Completed SILVIANO BRINK     Inpatient consult to Infectious Diseases  Once     Provider:  (Not yet assigned)    Completed ERNESTO ALSTON     Inpatient consult to Interventional Radiology  Once     Provider:  (Not yet  assigned)    Completed ALBA VIERA     Inpatient consult to Midline team  Once     Provider:  (Not yet assigned)    Completed RHYS LOPEZ     Inpatient consult to Midline team  Once     Provider:  (Not yet assigned)    Completed ANNETTE GORMAN     Inpatient consult to Midline team  Once     Provider:  (Not yet assigned)    Completed ANNETTE GORMAN     Inpatient consult to Midline team  Once     Provider:  (Not yet assigned)    Completed GEOVANNA BECK     Inpatient consult to Midline team  Once     Provider:  (Not yet assigned)    Completed JETHRO MO     Inpatient consult to Nephrology  Once     Provider:  (Not yet assigned)    Completed SONIA VERA     Inpatient consult to Neurology  Once     Provider:  (Not yet assigned)    Completed CORTNEY LATIF     Inpatient consult to Physical Medicine Rehab  Once     Provider:  (Not yet assigned)    Completed OLLIE PETERS JR     Inpatient consult to PICC team ([facility])  Once     Provider:  (Not yet assigned)    Completed OLILE PETERS JR     Inpatient consult to Podiatry  Once     Provider:  (Not yet assigned)    Completed CORTNEY LATIF     Inpatient consult to Psychiatry  Once     Provider:  (Not yet assigned)    Completed CORTNEY LATIF     Inpatient consult to Pulmonology  Once     Provider:  (Not yet assigned)    Completed SILVIANO BRINK     Inpatient consult to Pulmonology  Once     Provider:  (Not yet assigned)    Completed MAILE GUEVARA     Inpatient consult to Registered Dietitian/Nutritionist  Once     Provider:  (Not yet assigned)    Completed JULIA CHRISTINE     Inpatient consult to Registered Dietitian/Nutritionist  Once     Provider:  (Not yet assigned)    Completed ONELIA RAMÍREZ          Significant Diagnostic Studies: Labs: All labs within the past 24 hours have been reviewed    Pending Diagnostic Studies:     Procedure Component Value Units Date/Time    Cytology Specimen- Pulmonary  Medical Cytology [[id number]] Collected:  03/14/18 1140    Order Status:  Sent Lab Status:  No result     Specimen:  Bronch wash     Cytology Specimen- Pulmonary Medical Cytology [[id number]] Collected:  03/14/18 1139    Order Status:  Sent Lab Status:  No result     Specimen:  Bronch wash     Legionella antigen, urine [[id number]] Collected:  03/14/18 1124    Order Status:  Sent Lab Status:  In process Updated:  03/14/18 1124    Specimen:  Urine from Urine, Catheterized     Magnesium [[id number]] Collected:  03/28/18 1718    Order Status:  Sent Lab Status:  In process Updated:  03/28/18 1719    Specimen:  Blood from Blood     Magnesium [[id number]] Collected:  03/28/18 1718    Order Status:  Sent Lab Status:  In process Updated:  03/28/18 1719    Specimen:  Blood from Blood     Magnesium [[id number]] Collected:  03/28/18 1718    Order Status:  Sent Lab Status:  In process Updated:  03/28/18 1719    Specimen:  Blood from Blood     Magnesium [[id number]] Collected:  03/24/18 2355    Order Status:  Sent Lab Status:  In process Updated:  03/24/18 2356    Specimen:  Blood from Blood     Magnesium [[id number]] Collected:  03/24/18 2355    Order Status:  Sent Lab Status:  In process Updated:  03/24/18 2356    Specimen:  Blood from Blood     Magnesium [[id number]] Collected:  03/24/18 2355    Order Status:  Sent Lab Status:  In process Updated:  03/24/18 2356    Specimen:  Blood from Blood         Final Active Diagnoses:    Diagnosis Date Noted POA    PRINCIPAL PROBLEM:  Acute respiratory failure with hypoxia [J96.01] 03/14/2018 No    Debility [R53.81] 12/29/2014 Yes    Heart transplanted [Z94.1]  Not Applicable    Other ascites [R18.8] 03/31/2017 Yes    Leukocytosis [D72.829] 05/15/2018 No    CACHORRO (acute kidney injury) with ATN superimposed CKD 3  [N17.9] 05/09/2018 No    Abdominal distention [R14.0] 05/07/2018 Yes    Anxiety disorder [F41.9] 04/21/2018 Unknown    Chronic insomnia [F51.04] 04/21/2018 Unknown    Depression  [F32.9] 04/21/2018 Unknown    Gangrene [I96] 04/19/2018 No    Severe malnutrition [E43] 04/18/2018 No    Decubitus ulcer with suspected deep tissue injury [L89.90] 04/17/2018 Yes    Diarrhea [R19.7] 04/13/2018 Yes    Current chronic use of systemic steroids [Z79.52] 04/02/2018 Not Applicable    Alteration in skin integrity [R23.9] 03/28/2018 Yes    Acute encephalopathy [G93.40] 03/28/2018 Yes    ESRD (end stage renal disease) [N18.6] 03/14/2018 Yes    Potential impairment of skin integrity [Z91.89] 03/13/2018 Not Applicable    Hypoxia [R09.02] 03/13/2018 Yes    Pneumonia [J18.9] 03/13/2018 Yes    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] 03/12/2018 Yes    Iron deficiency anemia [D50.9] 01/23/2017 Yes    Type 1 diabetes mellitus with stage 3 chronic kidney disease [E10.22, N18.3] 07/20/2016 Yes    Hypothyroidism due to Hashimoto's thyroiditis [E03.8, E06.3] 07/01/2015 Yes    Anemia [D64.9] 04/21/2015 Yes    Anxiety [F41.9]  Yes      Problems Resolved During this Admission:    Diagnosis Date Noted Date Resolved POA    Gastroparesis [K31.84] 05/03/2018 05/03/2018 Yes    Shock, unspecified [R57.9] 03/16/2018 03/31/2018 No    DIC (disseminated intravascular coagulation) [D65] 03/24/2018 03/29/2018 No    On enteral nutrition [Z78.9] 04/05/2018 05/10/2018 No    Hypotension [I95.9] 03/26/2018 03/30/2018 No    Ileus [K56.7] 03/20/2018 03/29/2018 No    Pancytopenia [D61.818] 03/19/2018 03/29/2018 No    Thrombocytopenia [D69.6] 03/19/2018 03/29/2018 Unknown    Cough [R05] 03/12/2018 03/14/2018 Yes    Cough [R05] 02/20/2018 03/18/2018 Yes    Diarrhea [R19.7] 02/11/2018 03/18/2018 Yes    Fever [R50.9] 03/20/2015 05/08/2018 Yes      Discharged Condition: fair    Disposition: Rehab Facility    Follow Up:    Patient Instructions:     Diet diabetic     Activity as tolerated       Medications:  Transfer Medications (for Discharge Readmit only):   Current Facility-Administered Medications   Medication Dose Route  Frequency Provider Last Rate Last Dose    0.9%  NaCl infusion   Intravenous PRN Amanda Almaguer Alisa, NP        0.9%  NaCl infusion   Intravenous PRN Eulalio Mariee  mL/hr at 05/18/18 0746      0.9%  NaCl infusion   Intravenous PRN Amanda Almaguer Cuellar, NP        0.9%  NaCl infusion   Intravenous PRN Amanda Almaguer Alisa, NP        0.9%  NaCl infusion   Intravenous Once Amanda Tuckereric Cuellar, NETTIE        acetaminophen tablet 650 mg  650 mg Oral TID JOSE ADNIEL Whitten   650 mg at 05/16/18 2102    albuterol-ipratropium 2.5mg-0.5mg/3mL nebulizer solution 3 mL  3 mL Nebulization Q4H PRN Mamadou Paris DO   3 mL at 05/14/18 2319    ALPRAZolam tablet 0.5 mg  0.5 mg Oral Q6H PRN Corinne Presley PA-C   0.5 mg at 05/18/18 0611    benzonatate capsule 100 mg  100 mg Oral TID PRN JOSE DANIEL Whitten   100 mg at 05/11/18 0327    calcium carbonate 200 mg calcium (500 mg) chewable tablet 500 mg  500 mg Oral BID PRN Yosef Sotomayor MD   500 mg at 05/14/18 1128    cetirizine tablet 5 mg  5 mg Oral Daily JOSE DANIEL Whitten   5 mg at 05/18/18 0558    dextrose 50% injection 12.5 g  12.5 g Intravenous PRN Palmira Dorsey MD   12.5 g at 05/14/18 2202    dextrose 50% injection 25 g  25 g Intravenous PRN Palmira Dorsey MD   25 g at 04/30/18 1839    docusate sodium capsule 100 mg  100 mg Oral BID Corinne Presley PA-C   100 mg at 05/18/18 0557    dronabinol capsule 2.5 mg  2.5 mg Oral BID Josiah Walsh MD   2.5 mg at 05/17/18 2016    epoetin jose miguel injection 8,100 Units  100 Units/kg (Dosing Weight) Intravenous Every Mon, Wed, Fri Amanda Tripp Cuellar NP   8,100 Units at 05/18/18 1034    escitalopram oxalate tablet 20 mg  20 mg Oral Daily JOSE DANIEL Whitten   20 mg at 05/18/18 1256    famotidine tablet 20 mg  20 mg Oral QHS JOSE DANIEL Whitten   20 mg at 05/17/18 2015    glucagon (human recombinant) injection 1 mg  1 mg Intramuscular PRN Palmira Dorsey MD        glucose chewable tablet 16 g   16 g Oral PRN Palmira Dorsey MD        glucose chewable tablet 24 g  24 g Oral PRN Palmira Dorsey MD   24 g at 05/17/18 1316    guaiFENesin 12 hr tablet 1,200 mg  1,200 mg Oral BID Corinne Presley PA-C   1,200 mg at 05/18/18 1256    heparin (porcine) injection 1,000 Units  1,000 Units Intra-Catheter PRN Juaquin Tellez NP   1,000 Units at 05/18/18 1034    heparin (porcine) injection 5,000 Units  5,000 Units Subcutaneous Q12H Rita Louise NP   5,000 Units at 05/17/18 2016    insulin aspart U-100 pen 0-5 Units  0-5 Units Subcutaneous QID (AC + HS) PRN Ankur Keith MD   1 Units at 05/17/18 2352    insulin aspart U-100 pen 3-7 Units  3-7 Units Subcutaneous TIDWM Deonna Larry MD   3 Units at 05/18/18 1258    insulin detemir U-100 pen 5 Units  5 Units Subcutaneous BID Deonna Larry MD   5 Units at 05/18/18 1256    levalbuterol nebulizer solution 0.63 mg  0.63 mg Nebulization Q4H WAKE Dinh Lloyd MD   0.63 mg at 05/18/18 1145    levothyroxine tablet 137 mcg  137 mcg Oral Before breakfast JOSE DANIEL Whitten   137 mcg at 05/18/18 0556    midodrine tablet 10 mg  10 mg Oral TID Amanda Cuellar NP   10 mg at 05/18/18 0830    midodrine tablet 15 mg  15 mg Oral PRN Amanda Cuellar NP   15 mg at 05/18/18 0606    mycophenolate capsule 250 mg  250 mg Oral BID Alexandr Hernández MD   250 mg at 05/18/18 0557    ondansetron disintegrating tablet 8 mg  8 mg Oral Q6H PRN Mamadou Paris DO   8 mg at 05/16/18 1548    oxyCODONE immediate release tablet 5 mg  5 mg Oral Q6H PRN JOSE DANIEL Whitten   5 mg at 05/11/18 1300    polyethylene glycol packet 17 g  17 g Oral BID Kelsea Ryan PA-C   Stopped at 05/14/18 0900    predniSONE tablet 5 mg  5 mg Oral Daily Mamadou Paris DO   5 mg at 05/18/18 0556    promethazine (PHENERGAN) 25 mg in dextrose 5 % 50 mL IVPB  25 mg Intravenous Q6H PRN Jose A Lloyd  mL/hr at 05/14/18 2149 25 mg at 05/14/18 2149     QUEtiapine tablet 50 mg  50 mg Oral QHS Josiah Walsh MD   50 mg at 05/17/18 2016    tacrolimus capsule 3 mg  3 mg Oral BID Jose A Lloyd MD   3 mg at 05/18/18 0556     Current Outpatient Prescriptions   Medication Sig Dispense Refill    lancets Misc 1 Device by Misc.(Non-Drug; Combo Route) route 4 (four) times daily with meals and nightly. 100 each 5    pantoprazole (PROTONIX) 40 MG tablet TAKE ONE TABLET BY MOUTH EVERY DAY 30 tablet 11    albuterol-ipratropium (DUO-NEB) 2.5 mg-0.5 mg/3 mL nebulizer solution Take 3 mLs by nebulization every 4 (four) hours as needed for Wheezing. Rescue 1 Box 0    ALPRAZolam (XANAX) 0.5 MG tablet Take 1 tablet (0.5 mg total) by mouth every 6 (six) hours as needed for Anxiety. 10 tablet 0    aspirin (ECOTRIN) 81 MG EC tablet Take 1 tablet (81 mg total) by mouth once daily. 30 tablet 11    benzonatate (TESSALON) 100 MG capsule Take 1 capsule (100 mg total) by mouth 3 (three) times daily as needed for Cough. 30 capsule 0    cetirizine (ZYRTEC) 5 MG tablet Take 1 tablet (5 mg total) by mouth once daily. 30 tablet 0    dronabinol (MARINOL) 2.5 MG capsule Take 1 capsule (2.5 mg total) by mouth 2 (two) times daily. 30 capsule 0    escitalopram oxalate (LEXAPRO) 20 MG tablet Take 1 tablet (20 mg total) by mouth once daily. 30 tablet 11    gabapentin (NEURONTIN) 300 MG capsule Take 3 capsules (900 mg total) by mouth 3 (three) times daily. 90 capsule 1    insulin aspart U-100 (NOVOLOG) 100 unit/mL InPn pen Inject 4 Units into the skin 3 (three) times daily. 3.6 mL 11    insulin detemir U-100 (LEVEMIR FLEXTOUCH) 100 unit/mL (3 mL) SubQ InPn pen Inject 8 Units into the skin 2 (two) times daily. 2 Box 1    levalbuterol (XOPENEX) 0.63 mg/3 mL nebulizer solution Take 3 mLs (0.63 mg total) by nebulization every 4 (four) hours while awake. Rescue 4 Box 0    levothyroxine (SYNTHROID) 137 MCG Tab tablet Take 1 tablet (137 mcg total) by mouth before breakfast. 30 tablet 11     "magnesium oxide (MAG-OX) 400 mg tablet Take 1 tablet (400 mg total) by mouth once daily. 30 tablet 11    midodrine (PROAMATINE) 10 MG tablet Take 1 tablet (10 mg total) by mouth 3 (three) times daily with meals. 90 tablet 11    midodrine (PROAMATINE) 5 MG Tab Take 3 tablets (15 mg total) by mouth as needed (30 minutes prior to HD). 30 tablet 0    mycophenolate (CELLCEPT) 250 mg Cap Take 1 capsule (250 mg total) by mouth 2 (two) times daily. 60 capsule 11    ondansetron (ZOFRAN-ODT) 4 MG TbDL Take 2 tablets (8 mg total) by mouth every 8 (eight) hours as needed (nausea). 15 tablet 0    oxyCODONE (ROXICODONE) 5 MG immediate release tablet Take 1 tablet (5 mg total) by mouth every 6 (six) hours as needed. 15 tablet 0    pen needle, diabetic 32 gauge x 5/32" Ndle Uses 5 pen needles a day 200 each 12    polyethylene glycol (GLYCOLAX) 17 gram PwPk Take 17 g by mouth 2 (two) times daily. 10 packet 0    pravastatin (PRAVACHOL) 40 MG tablet Take 1 tablet (40 mg total) by mouth once daily. 30 tablet 0    predniSONE (DELTASONE) 2.5 MG tablet Take 1 tablet (2.5 mg total) by mouth once daily. S/P Heart Transplant 11/18/2014 ICD-10 Z94.1 30 tablet 11    QUEtiapine (SEROQUEL) 50 MG tablet Take 1 tablet (50 mg total) by mouth every evening. 30 tablet 11    tacrolimus (PROGRAF) 1 MG Cap Taked 3mg orally in the morning and 3mg orally in the evening. S/p heart transplant  11/18/2014  ICD-10 Z94.1 60 capsule 0    tamsulosin (FLOMAX) 0.4 mg Cp24 TAKE 2 CAPSULES(0.8 MG) BY MOUTH EVERY EVENING 60 capsule 11     Discussed d/c plan with Dr. ranjit Walsh MD  Heart Transplant  [facility]  "

## 2018-05-18 NOTE — ASSESSMENT & PLAN NOTE
- Resolved. Now with trach decannulation  - Etiology = RSV   - Stoma care / education provided by pulmonology   - Continue pulmonary toilet to help with respiratory mechanics   - Appreciate PT/OT/Wound care.  - finished mixofloxacin  - off oxygen supplementation  - repeat CXR shows improved aeration to the left  - HD as scheduled at rehab

## 2018-05-18 NOTE — PLAN OF CARE
Problem: Patient Care Overview  Goal: Plan of Care Review  Outcome: Ongoing (interventions implemented as appropriate)  3 hour hemodialysis complete.  Blood rinsed back.  LIJ permcath flushed with normal saline, both lumens filled with heparin, capped and taped.  Net UF 2L.  Tolerated well.  Procrit given.  Transported from dialysis unit to room 8098 via bed by 2 transporters.

## 2018-05-18 NOTE — PROGRESS NOTES
[facility] H[facility] NOTE   -See previous notes for details.  Patient currently on hemodialysis for removal of uremic toxins . Tolerating 2 L Net UF. L IJ TDC no issues.     Patient seen and evaluated on hemodialysis, tolerating treatment, see HD flowsheet for vitals and assessments.      No Hypotension, chest pain, shortness of breath, cramping, nausea or vomiting.     Continue iHD MWF.   Continue Epogen.     Amanda Cuellar NP  Nephrology

## 2018-05-18 NOTE — ASSESSMENT & PLAN NOTE
BG goal 140-180    Episode of hypoglycemia yesterday.  Levemir 16u with little to no PO intake.    Recommend decreasing levemir dose again   Recommend Levemir 5u BID (~30% decrease).  Continue 4-8u Novolog according to PO% completion.  Continue low dose correction insulin.  BG monitoring ac/hs    Patient is T1DM.  Recommend not to hold Levemir doses.  If experiences hypoglycemia, please notify endocrinology.

## 2018-05-18 NOTE — ASSESSMENT & PLAN NOTE
- multiple toe ulcers  - secondary to HIT versus embolic events during rx  - Podiatry not inclined for amputation.   - to continue current skin care per dermatology (betadine) rx

## 2018-05-18 NOTE — ASSESSMENT & PLAN NOTE
- continue basal bolus with titration as needed   - BS labile due to poor oral intake.  - POC at rehab and insulin adjustment as needed

## 2018-05-18 NOTE — PROGRESS NOTES
DISCHARGE    SW to pt's room for d/c plan. Pt not in room at this time. Pt's brother at bedside and confirms pt is aware he will be transferring to [facility] today and is in agreement with plan. CLAUDE called pt's wife Elizabeth and confirmed she is in agreement with plan. Elizabeth states she is working today, but plans to meet pt at [facility] after she gets off work. CLAUDE confirms with  Heidi [facility] is ready to accept pt any time after 1:30. CLAUDE confirmed pt can ride in a wheelchair van. CLAUDE scheduled wheelchair van for 2 pm with [employer] transportation [id number]. Claude confirmed bedside nurse has number to call report. No other needs indicated at this time. SW providing psychosocial and counseling support, education, resources, and d/c planning as needed. SW remains available.

## 2018-05-24 NOTE — PT/OT/SLP DISCHARGE
Physical Therapy Discharge Summary    Name: Lv Crocker  MRN: [id number]   Principal Problem: Acute respiratory failure with hypoxia     Patient Discharged from acute Physical Therapy on 2018.  Please refer to prior PT noted date on 2018 for functional status.     Assessment:     Patient was discharged unexpectedly.  Information required to complete an accurate discharge summary is unknown.  Refer to therapy initial evaluation and last progress note for initial and most recent functional status and goal achievement.  Recommendations made may be found in medical record.    Objective:     GOALS:    Physical Therapy Goals     Not on file          Multidisciplinary Problems (Resolved)        Problem: Physical Therapy Goal    Goal Priority Disciplines Outcome Goal Variances Interventions   Physical Therapy Goal   (Resolved)     PT/OT, PT Outcome(s) achieved     Description:  Goals to be met by: 18     Patient will increase functional independence with mobility by performin. Supine to sit with Moderate Assistance.  2. Sit to supine with Moderate Assistance.  3. Rolling to Left and Right with Minimal Assistance.   4. Sit to stand transfer with Moderate Assistance.  5. Bed to chair transfer with Maximum Assistance.  6. Gait  x 5 feet with Minimal Assistance.   7.  Pt to perf and be compliant with LE HEP to improve vascularity and mm strength.                             Reasons for Discontinuation of Therapy Services  Transfer to alternate level of care.      Plan:     Patient Discharged to: Inpatient Rehab.    Corey Hernández, PT  2018

## 2018-05-29 PROBLEM — R50.9 FEVER: Status: ACTIVE | Noted: 2018-01-01

## 2018-05-29 NOTE — ASSESSMENT & PLAN NOTE
-blood cx drawn in ED  -patient is anuric  -CT chest without contrast  -start broad spectrum antibiotics

## 2018-05-29 NOTE — CONSULTS
[facility]  Nephrology  Consult Note    Patient Name: Lv Crocker  MRN: [id number]  Admission Date: 5/29/2018  Hospital Length of Stay: 0 days  Attending Provider: Corey Navas MD   Primary Care Physician: Philippe Mohr MD  Principal Problem:<principal problem not specified>    Inpatient consult to Nephrology  Consult performed by: EMMA GARCIA  Consult ordered by: SONIA VERA  Reason for consult: cachorro        Subjective:     HPI: 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 with recurrent pleural effusions/ascites requiring monthly thoracenteses/paracenteses (last paracentesis 5/15/18 3.5 L), VATS 1/11/18 with Pleur-X catheter (now removed), and recent admission for diarrhea and RSV complicated with respiratory failure requiring intubation/trach/PEG (both Trach/PEG removed 5/10) and CACHORRO on superimposed on CKD stage 4 secondary to prograft toxcity?/ischemic ATN (see last admission consult 3/11-5/18/18-discharge to rehab) now dialysis dependent via [facility] (since 3/14/2018) who is admitted from [facility] for fever 100-101.6 since Friday night. Denies PND, rhinorrhea, cough or shortness of breath. Wife reports doing well at Rehab up -transfers and parallel bars.  Eating well since appetite stimulator started at rehab. Had HD 4 days last week MWTF due to volume excess. Last HD yesterday 3 L removed. Denies L IJ site tenderness.     CT chest wo contrast showed patchy subsegmental opacities through out lungs, mod volume right sided pleural effusion with trace left side pleural fluid and persistent round RLL opacity, new mod t12 compression deformity, and small volume abdominal ascites. Lactate 0.9. Blood cultures collected in ED. Vancomycin started in ED. SBP , Tacy 111's,  % on 2 L NC.     Past Medical History:  "  Diagnosis Date    Anxiety     Arthritis     Ascites     Thought to be 2/2 heart tpx; liver bx 3/31/17 w/o significant fibrosis    Cardiomyopathy     Depression     Controlled "for the most part" on Lexipro    Encounter for blood transfusion     during transplant    GERD (gastroesophageal reflux disease)     Hashimoto's disease     History of CHF (congestive heart failure)     Previously EF 20% (prior to heart transplant); last EF 60%, PAP 41 as of 12/12/17; throught to be 2/2 genetics & DM1    History of coronary artery disease     H/o Coronary artery disease; resolved since heart transplant 2014    History of myocardial infarction     h/o MI x3 prior to heart transplant    HLD (hyperlipidemia) 6/11/2015    Hx of psychiatric care     Hypoglycemia unawareness in type 1 diabetes mellitus     Hypothyroid     Initially hyperthyroid 2/2 Hoshimoto's thyroiditis; now on levothyroxine 150 mcg qd    Pleural effusion due to another disorder     Thought to be 2/2 heart tpx; s/p PleuRx catheter placement and removal after 1-2 months    Psychiatric problem     Pulmonary hypertension assoc with unclear multi-factorial mechanisms 12/12/2017    PAP 41 (EF 60%) on ECHO 12/12/17    Syncope 6/30/2015    Type I diabetes mellitus, well controlled     Well controlled; Dx'd @7y/o; on N insulin 20U BID & Humalog 10U w/meals +SSI; Glu 89 11/9/17; A1c 7.2% 4/5/17    Unspecified essential hypertension 05/29/2014    Well controlled; Last /57 on 11/9/17       Past Surgical History:   Procedure Laterality Date    CARDIAC CATHETERIZATION      CARDIAC SURGERY      CHOLECYSTECTOMY      COLONOSCOPY N/A 3/13/2018    Procedure: COLONOSCOPY;  Surgeon: Tim Spencer MD;  Location: [facility] ([address] [place] [address]);  Service: Endoscopy;  Laterality: N/A;    CORONARY ANGIOPLASTY      FOOT SPLIT TRANSFER OF THE POSTERIOR TIBIALIS TENDON PROCEDURE      HEART TRANSPLANT  2014    KNEE SURGERY      PleuRx catheter placement  " 01/11/2018    Plan for removal after 1-2 months by Dr. James in cardiothoracic surgery    s/p oht  November 2014    stent placemnet         Review of patient's allergies indicates:   Allergen Reactions    No known drug allergies      Current Facility-Administered Medications   Medication Frequency    ALPRAZolam tablet 0.5 mg Q6H PRN    aspirin EC tablet 81 mg Daily    benzonatate capsule 100 mg TID PRN    ceFEPIme injection 1 g Q12H    cetirizine tablet 5 mg Daily    dextrose 50% injection 12.5 g PRN    dextrose 50% injection 25 g PRN    escitalopram oxalate tablet 20 mg Daily    gabapentin capsule 100 mg TID    glucagon (human recombinant) injection 1 mg PRN    glucose chewable tablet 16 g PRN    glucose chewable tablet 24 g PRN    heparin (porcine) injection 5,000 Units Q8H    insulin aspart U-100 pen 1-10 Units QID (AC + HS) PRN    insulin detemir U-100 pen 8 Units BID    levalbuterol nebulizer solution 0.63 mg Q4H WAKE    levothyroxine tablet 137 mcg Before breakfast    magnesium oxide tablet 400 mg Daily    midodrine tablet 10 mg TID WM    mycophenolate capsule 250 mg BID    ondansetron disintegrating tablet 8 mg Q8H PRN    oxyCODONE immediate release tablet 5 mg Q6H PRN    pantoprazole EC tablet 40 mg Daily    polyethylene glycol packet 17 g BID    pravastatin tablet 40 mg Daily    predniSONE tablet 2.5 mg Daily    QUEtiapine tablet 50 mg QHS    tacrolimus capsule 3 mg BID    vancomycin 1 gram/250 mL in sodium chloride 0.9% IVPB 1 g Once     Current Outpatient Prescriptions   Medication    albuterol-ipratropium (DUO-NEB) 2.5 mg-0.5 mg/3 mL nebulizer solution    ALPRAZolam (XANAX) 0.5 MG tablet    aspirin (ECOTRIN) 81 MG EC tablet    cetirizine (ZYRTEC) 5 MG tablet    escitalopram oxalate (LEXAPRO) 20 MG tablet    gabapentin (NEURONTIN) 300 MG capsule    insulin aspart U-100 (NOVOLOG) 100 unit/mL InPn pen    insulin detemir U-100 (LEVEMIR FLEXTOUCH) 100 unit/mL (3 mL)  "SubQ InPn pen    levalbuterol (XOPENEX) 0.63 mg/3 mL nebulizer solution    levothyroxine (SYNTHROID) 137 MCG Tab tablet    magnesium oxide (MAG-OX) 400 mg tablet    midodrine (PROAMATINE) 10 MG tablet    mycophenolate (CELLCEPT) 250 mg Cap    ondansetron (ZOFRAN-ODT) 4 MG TbDL    oxyCODONE (ROXICODONE) 5 MG immediate release tablet    pantoprazole (PROTONIX) 40 MG tablet    pravastatin (PRAVACHOL) 40 MG tablet    predniSONE (DELTASONE) 2.5 MG tablet    QUEtiapine (SEROQUEL) 50 MG tablet    tacrolimus (PROGRAF) 1 MG Cap    dronabinol (MARINOL) 2.5 MG capsule    lancets Misc    midodrine (PROAMATINE) 5 MG Tab    pen needle, diabetic 32 gauge x 5/32" Ndle    polyethylene glycol (GLYCOLAX) 17 gram PwPk     Family History     Problem Relation (Age of Onset)    Cancer Brother (50)    Depression Mother    Diabetes Mother, Father, Brother, Son, Sister, Maternal Uncle, Paternal Aunt, Maternal Grandmother, Maternal Grandfather    Heart disease Mother, Brother    Hypertension Mother, Father, Brother    Kidney disease Mother, Father    Stroke Father, Brother    Vision loss Brother        Social History Main Topics    Smoking status: Never Smoker    Smokeless tobacco: Never Used    Alcohol use No    Drug use: No    Sexual activity: Yes     Partners: Female     Review of Systems   Constitutional: Positive for activity change, appetite change, fatigue and fever. Negative for chills.   HENT: Negative.    Eyes: Negative.    Respiratory: Positive for cough (dry cough started in ED). Negative for choking, chest tightness and wheezing.    Cardiovascular: Positive for palpitations and leg swelling. Negative for chest pain.   Gastrointestinal: Positive for abdominal distention. Negative for nausea and vomiting.   Endocrine: Negative.    Genitourinary:        Anuric   Musculoskeletal: Positive for arthralgias and gait problem.   Allergic/Immunologic: Positive for immunocompromised state.   Neurological: Positive for " weakness. Negative for tremors, seizures, light-headedness and headaches.   Hematological: Bruises/bleeds easily.   Psychiatric/Behavioral: Negative.      Objective:     Vital Signs (Most Recent):  Temp: 98.5 °F (36.9 °C) (05/29/18 0713)  Pulse: (!) 112 (05/29/18 0805)  Resp: 13 (05/29/18 0805)  BP: 99/61 (05/29/18 0702)  SpO2: 100 % (05/29/18 0805)  O2 Device (Oxygen Therapy): nasal cannula (05/29/18 0609) Vital Signs (24h Range):  Temp:  [98.2 °F (36.8 °C)-100.1 °F (37.8 °C)] 98.5 °F (36.9 °C)  Pulse:  [111-123] 112  Resp:  [13-19] 13  SpO2:  [93 %-100 %] 100 %  BP: ()/(52-61) 99/61     Weight: 79.9 kg (176 lb 2.4 oz) (05/29/18 0152)  Body mass index is 27.59 kg/m².  Body surface area is 1.94 meters squared.    No intake/output data recorded.    Physical Exam   Constitutional: He is oriented to person, place, and time. He appears well-developed.   Ill appearing   HENT:   Head: Atraumatic.   Eyes: EOM are normal.   Neck: Neck supple.   Cardiovascular: Tachycardia present.    Pulmonary/Chest: Effort normal and breath sounds normal. No respiratory distress.   Abdominal: Soft. Bowel sounds are normal. He exhibits distension. There is no tenderness.   Musculoskeletal: Tenderness: BLE.   Neurological: He is alert and oriented to person, place, and time.   Skin: Skin is warm and dry.   -L IJ Permacath site no signs of tunnel or exit site infection. Did not remove dressing.  -Right necrotic toes 1-3-similar to previous   -Left great toe necrotic -mild erythema (someone accidentally stepped on toe last week)       Psychiatric: He has a normal mood and affect.       Significant Labs:  All labs within the past 24 hours have been reviewed.    Significant Imaging:  Labs: Reviewed    Assessment/Plan:     ESRD (end stage renal disease)    CACHORRO on superimposed on CKD stage 4 secondary to prograft toxcity/ischemic ATN (see last admission consult 3/11-5/18/18-discharge to rehab) now dialysis dependent via [facility] (since  3/14/2018) who is admitted from [facility] for fever 100-101.6 since Friday night. Last HD yesterday 5/28 3 L removed.   -L IJ permacath no overt signs of tunnel or exit site infection.   -Blood cultures are pending. Antibiotics per primary/Infectious Disease  -Lytes and acid base stable. Hypotension. BLE extremity edema.   -Start SLED for clearance and volume management  ml as tolerated. Midodrine TID. Albumin PRN. Discussed with primary team.        Anemia of CKD  -No iron. Continue Epo MWF      BMD  Lab Results   Component Value Date    PTH 23.0 04/19/2018    CALCIUM 9.0 05/29/2018    CAION 0.98 (L) 03/17/2018    PHOS 3.1 05/18/2018   -Hold Renvela. Renal diet.               Thank you for your consult. I will follow-up with patient. Please contact us if you have any additional questions.    Amanda Cuellar NP  Nephrology  [facility]    Patient seen and examined with  NETTIE Cuellar;   ; I have reviewed and agree with assessment and plan

## 2018-05-29 NOTE — ED PROVIDER NOTES
"Encounter Date: 5/29/2018       History     Chief Complaint   Patient presents with    Fever     sent from [facility] for fever of 101, pt had heart transplant in 2014, current temp 100.1, pt AAOx4 and has no complaints at this time, pt has decubitus ulcer to sacrum      HPI     41 yo male with pmhx of ICM s/p heart transplant (2014) and recent 2 month hospital admission for sepsis secondary to pneumonia requiring intubation presenting for fever.  Pt discharged from hospital 7 days ago. Initially doing well. [facility] rehab facility documented fever, Tmax 101F. Pt otherwise has no complaints. No cough, SOB, chest pain, nausea, or vomiting. No recent injury or fall. No hematochezia or melena.       Review of patient's allergies indicates:   Allergen Reactions    No known drug allergies      Past Medical History:   Diagnosis Date    Anxiety     Arthritis     Ascites     Thought to be 2/2 heart tpx; liver bx 3/31/17 w/o significant fibrosis    Cardiomyopathy     Depression     Controlled "for the most part" on Lexipro    Encounter for blood transfusion     during transplant    GERD (gastroesophageal reflux disease)     Hashimoto's disease     History of CHF (congestive heart failure)     Previously EF 20% (prior to heart transplant); last EF 60%, PAP 41 as of 12/12/17; throught to be 2/2 genetics & DM1    History of coronary artery disease     H/o Coronary artery disease; resolved since heart transplant 2014    History of myocardial infarction     h/o MI x3 prior to heart transplant    HLD (hyperlipidemia) 6/11/2015    Hx of psychiatric care     Hypoglycemia unawareness in type 1 diabetes mellitus     Hypothyroid     Initially hyperthyroid 2/2 Hoshimoto's thyroiditis; now on levothyroxine 150 mcg qd    Pleural effusion due to another disorder     Thought to be 2/2 heart tpx; s/p PleuRx catheter placement and removal after 1-2 months    Psychiatric problem     Pulmonary hypertension assoc with " unclear multi-factorial mechanisms 12/12/2017    PAP 41 (EF 60%) on ECHO 12/12/17    Syncope 6/30/2015    Type I diabetes mellitus, well controlled     Well controlled; Dx'd @7y/o; on N insulin 20U BID & Humalog 10U w/meals +SSI; Glu 89 11/9/17; A1c 7.2% 4/5/17    Unspecified essential hypertension 05/29/2014    Well controlled; Last /57 on 11/9/17     Past Surgical History:   Procedure Laterality Date    CARDIAC CATHETERIZATION      CARDIAC SURGERY      CHOLECYSTECTOMY      COLONOSCOPY N/A 3/13/2018    Procedure: COLONOSCOPY;  Surgeon: Tim Spencer MD;  Location: [facility] ([address] [place] [address]);  Service: Endoscopy;  Laterality: N/A;    CORONARY ANGIOPLASTY      FOOT SPLIT TRANSFER OF THE POSTERIOR TIBIALIS TENDON PROCEDURE      HEART TRANSPLANT  2014    KNEE SURGERY      PleuRx catheter placement  01/11/2018    Plan for removal after 1-2 months by Dr. James in cardiothoracic surgery    s/p oht  November 2014    stent placemnet       Family History   Problem Relation Age of Onset    Heart disease Mother     Kidney disease Mother     Diabetes Mother     Hypertension Mother     Depression Mother     Kidney disease Father     Diabetes Father     Hypertension Father     Stroke Father     Heart disease Brother     Stroke Brother     Diabetes Brother     Cancer Brother 50        pancreatic    Vision loss Brother     Hypertension Brother     Diabetes Son     Diabetes Sister     Diabetes Maternal Uncle     Diabetes Paternal Aunt     Diabetes Maternal Grandmother     Diabetes Maternal Grandfather      Social History   Substance Use Topics    Smoking status: Never Smoker    Smokeless tobacco: Never Used    Alcohol use No     Review of Systems   Constitutional: Negative for chills, fatigue and fever.   HENT: Negative for congestion and drooling.    Eyes: Negative for visual disturbance.   Respiratory: Negative for cough and shortness of breath.    Cardiovascular: Negative for chest  pain.   Gastrointestinal: Negative for abdominal pain, nausea and vomiting.   Genitourinary: Negative for difficulty urinating.   Musculoskeletal: Negative for back pain.   Skin: Negative for rash.   Neurological: Negative for syncope, light-headedness and headaches.   Psychiatric/Behavioral: Negative for agitation and behavioral problems.       Physical Exam     Initial Vitals [05/29/18 0152]   BP Pulse Resp Temp SpO2   (!) 96/55 (!) 123 18 100.1 °F (37.8 °C) (!) 94 %      MAP       68.67         Physical Exam    Nursing note and vitals reviewed.  Constitutional:   Appears chronically ill   Eyes: Conjunctivae and EOM are normal. Pupils are equal, round, and reactive to light.   Neck: Normal range of motion. Neck supple.   Cardiovascular: Regular rhythm, normal heart sounds and intact distal pulses. Exam reveals no gallop and no friction rub.    No murmur heard.  Tachycardic   Pulmonary/Chest: No respiratory distress. He has no wheezes.   Diminished breath sounds on right, breathing comfortably   Abdominal: Soft. Bowel sounds are normal. There is no tenderness.   Musculoskeletal: Normal range of motion.   Neurological: He is alert and oriented to person, place, and time. He has normal strength. No sensory deficit.   Somnolent but easily awakens to voice   Skin: Skin is warm and dry. Capillary refill takes less than 2 seconds.   Digital necrosis to bilateral great toes. Erythema and tenderness to left great toe   Psychiatric: He has a normal mood and affect. Thought content normal.         ED Course   Procedures  Labs Reviewed   CBC W/ AUTO DIFFERENTIAL - Abnormal; Notable for the following:        Result Value    RBC 2.59 (*)     Hemoglobin 7.8 (*)     Hematocrit 26.0 (*)      (*)     MCHC 30.0 (*)     RDW 21.5 (*)     Immature Granulocytes 0.9 (*)     Immature Grans (Abs) 0.07 (*)     Gran% 75.2 (*)     Lymph% 15.0 (*)     All other components within normal limits   COMPREHENSIVE METABOLIC PANEL - Abnormal;  Notable for the following:     BUN, Bld 28 (*)     Creatinine 4.2 (*)     Albumin 2.5 (*)     Alkaline Phosphatase 151 (*)     eGFR if  18.6 (*)     eGFR if non  16.1 (*)     All other components within normal limits   B-TYPE NATRIURETIC PEPTIDE - Abnormal; Notable for the following:     BNP 1,228 (*)     All other components within normal limits   CULTURE, BLOOD   CULTURE, BLOOD   LACTIC ACID, PLASMA   MAGNESIUM   TROPONIN I   URINALYSIS, REFLEX TO URINE CULTURE           MDM PGY-1    43 yo male with pmhx of ICM s/p heart transplant (2014), hx of right sided pleural effusion, and recent 2 month hospital admission for sepsis secondary to pneumonia requiring intubation presenting for fever x1 day. Shock index on arrival. Hypotensive, tachycardic, and tachypneic. Afebrile. Exam notable for diminished breath sounds on right. Bedside ultrasound notable for right sided pleural effusion and B lines. No evidence of pericardial effusion or right heart strain. Differential ddx includes pulmonary edema, sepsis, pneumonia, UTI, ACS, pneumothorax, and metabolic derangement. Given shock index strong suspicion for cardiogenic shock vs sepsis. Broad work-up ordered. Cardiology consulted and will admit the pt for further evaluation and treatment.     Attending Note:  Physician Attestation Statement: I have personally seen and examined this patient. As the supervising MD I agree with the above history. As the supervising MD I agree with the above PE. As the supervising MD I agree with the above treatment, course, plan, and disposition.                            Clinical Impression:   The encounter diagnosis was Fever.                           Corey Navas MD  05/30/18 3121

## 2018-05-29 NOTE — NURSING
Rounds Report: Attended interdisciplinary rounds this morning with the transplant team including SW, physicians, fellows,  mid-level providers, and transplant coordinators.  Discussed plan of care, including antibiotics started last night. Will start CRRT over night to remove more fluid. Possible discharge back to rehab tomorrow.

## 2018-05-29 NOTE — SUBJECTIVE & OBJECTIVE
"Past Medical History:   Diagnosis Date    Anxiety     Arthritis     Ascites     Thought to be 2/2 heart tpx; liver bx 3/31/17 w/o significant fibrosis    Cardiomyopathy     Depression     Controlled "for the most part" on Lexipro    Encounter for blood transfusion     during transplant    GERD (gastroesophageal reflux disease)     Hashimoto's disease     History of CHF (congestive heart failure)     Previously EF 20% (prior to heart transplant); last EF 60%, PAP 41 as of 12/12/17; throught to be 2/2 genetics & DM1    History of coronary artery disease     H/o Coronary artery disease; resolved since heart transplant 2014    History of myocardial infarction     h/o MI x3 prior to heart transplant    HLD (hyperlipidemia) 6/11/2015    Hx of psychiatric care     Hypoglycemia unawareness in type 1 diabetes mellitus     Hypothyroid     Initially hyperthyroid 2/2 Hoshimoto's thyroiditis; now on levothyroxine 150 mcg qd    Pleural effusion due to another disorder     Thought to be 2/2 heart tpx; s/p PleuRx catheter placement and removal after 1-2 months    Psychiatric problem     Pulmonary hypertension assoc with unclear multi-factorial mechanisms 12/12/2017    PAP 41 (EF 60%) on ECHO 12/12/17    Syncope 6/30/2015    Type I diabetes mellitus, well controlled     Well controlled; Dx'd @5y/o; on N insulin 20U BID & Humalog 10U w/meals +SSI; Glu 89 11/9/17; A1c 7.2% 4/5/17    Unspecified essential hypertension 05/29/2014    Well controlled; Last /57 on 11/9/17       Past Surgical History:   Procedure Laterality Date    CARDIAC CATHETERIZATION      CARDIAC SURGERY      CHOLECYSTECTOMY      COLONOSCOPY N/A 3/13/2018    Procedure: COLONOSCOPY;  Surgeon: Tim Spencer MD;  Location: [facility] ([address] [place] [address]);  Service: Endoscopy;  Laterality: N/A;    CORONARY ANGIOPLASTY      FOOT SPLIT TRANSFER OF THE POSTERIOR TIBIALIS TENDON PROCEDURE      HEART TRANSPLANT  2014    KNEE SURGERY      PleuRx " catheter placement  01/11/2018    Plan for removal after 1-2 months by Dr. James in cardiothoracic surgery    s/p oht  November 2014    stent placemnet         Review of patient's allergies indicates:   Allergen Reactions    No known drug allergies      Current Facility-Administered Medications   Medication Frequency    ALPRAZolam tablet 0.5 mg Q6H PRN    aspirin EC tablet 81 mg Daily    benzonatate capsule 100 mg TID PRN    ceFEPIme injection 1 g Q12H    cetirizine tablet 5 mg Daily    dextrose 50% injection 12.5 g PRN    dextrose 50% injection 25 g PRN    escitalopram oxalate tablet 20 mg Daily    gabapentin capsule 100 mg TID    glucagon (human recombinant) injection 1 mg PRN    glucose chewable tablet 16 g PRN    glucose chewable tablet 24 g PRN    heparin (porcine) injection 5,000 Units Q8H    insulin aspart U-100 pen 1-10 Units QID (AC + HS) PRN    insulin detemir U-100 pen 8 Units BID    levalbuterol nebulizer solution 0.63 mg Q4H WAKE    levothyroxine tablet 137 mcg Before breakfast    magnesium oxide tablet 400 mg Daily    midodrine tablet 10 mg TID WM    mycophenolate capsule 250 mg BID    ondansetron disintegrating tablet 8 mg Q8H PRN    oxyCODONE immediate release tablet 5 mg Q6H PRN    pantoprazole EC tablet 40 mg Daily    polyethylene glycol packet 17 g BID    pravastatin tablet 40 mg Daily    predniSONE tablet 2.5 mg Daily    QUEtiapine tablet 50 mg QHS    tacrolimus capsule 3 mg BID    vancomycin 1 gram/250 mL in sodium chloride 0.9% IVPB 1 g Once     Current Outpatient Prescriptions   Medication    albuterol-ipratropium (DUO-NEB) 2.5 mg-0.5 mg/3 mL nebulizer solution    ALPRAZolam (XANAX) 0.5 MG tablet    aspirin (ECOTRIN) 81 MG EC tablet    cetirizine (ZYRTEC) 5 MG tablet    escitalopram oxalate (LEXAPRO) 20 MG tablet    gabapentin (NEURONTIN) 300 MG capsule    insulin aspart U-100 (NOVOLOG) 100 unit/mL InPn pen    insulin detemir U-100 (LEVEMIR  "FLEXTOUCH) 100 unit/mL (3 mL) SubQ InPn pen    levalbuterol (XOPENEX) 0.63 mg/3 mL nebulizer solution    levothyroxine (SYNTHROID) 137 MCG Tab tablet    magnesium oxide (MAG-OX) 400 mg tablet    midodrine (PROAMATINE) 10 MG tablet    mycophenolate (CELLCEPT) 250 mg Cap    ondansetron (ZOFRAN-ODT) 4 MG TbDL    oxyCODONE (ROXICODONE) 5 MG immediate release tablet    pantoprazole (PROTONIX) 40 MG tablet    pravastatin (PRAVACHOL) 40 MG tablet    predniSONE (DELTASONE) 2.5 MG tablet    QUEtiapine (SEROQUEL) 50 MG tablet    tacrolimus (PROGRAF) 1 MG Cap    dronabinol (MARINOL) 2.5 MG capsule    lancets Misc    midodrine (PROAMATINE) 5 MG Tab    pen needle, diabetic 32 gauge x 5/32" Ndle    polyethylene glycol (GLYCOLAX) 17 gram PwPk     Family History     Problem Relation (Age of Onset)    Cancer Brother (50)    Depression Mother    Diabetes Mother, Father, Brother, Son, Sister, Maternal Uncle, Paternal Aunt, Maternal Grandmother, Maternal Grandfather    Heart disease Mother, Brother    Hypertension Mother, Father, Brother    Kidney disease Mother, Father    Stroke Father, Brother    Vision loss Brother        Social History Main Topics    Smoking status: Never Smoker    Smokeless tobacco: Never Used    Alcohol use No    Drug use: No    Sexual activity: Yes     Partners: Female     Review of Systems   Constitutional: Positive for activity change, appetite change, fatigue and fever. Negative for chills.   HENT: Negative.    Eyes: Negative.    Respiratory: Positive for cough (dry cough started in ED). Negative for choking, chest tightness and wheezing.    Cardiovascular: Positive for palpitations and leg swelling. Negative for chest pain.   Gastrointestinal: Positive for abdominal distention. Negative for nausea and vomiting.   Endocrine: Negative.    Genitourinary:        Anuric   Musculoskeletal: Positive for arthralgias and gait problem.   Allergic/Immunologic: Positive for immunocompromised state. "   Neurological: Positive for weakness. Negative for tremors, seizures, light-headedness and headaches.   Hematological: Bruises/bleeds easily.   Psychiatric/Behavioral: Negative.      Objective:     Vital Signs (Most Recent):  Temp: 98.5 °F (36.9 °C) (05/29/18 0713)  Pulse: (!) 112 (05/29/18 0805)  Resp: 13 (05/29/18 0805)  BP: 99/61 (05/29/18 0702)  SpO2: 100 % (05/29/18 0805)  O2 Device (Oxygen Therapy): nasal cannula (05/29/18 0609) Vital Signs (24h Range):  Temp:  [98.2 °F (36.8 °C)-100.1 °F (37.8 °C)] 98.5 °F (36.9 °C)  Pulse:  [111-123] 112  Resp:  [13-19] 13  SpO2:  [93 %-100 %] 100 %  BP: ()/(52-61) 99/61     Weight: 79.9 kg (176 lb 2.4 oz) (05/29/18 0152)  Body mass index is 27.59 kg/m².  Body surface area is 1.94 meters squared.    No intake/output data recorded.    Physical Exam   Constitutional: He is oriented to person, place, and time. He appears well-developed.   Ill appearing   HENT:   Head: Atraumatic.   Eyes: EOM are normal.   Neck: Neck supple.   Cardiovascular: Tachycardia present.    Pulmonary/Chest: Effort normal and breath sounds normal. No respiratory distress.   Abdominal: Soft. Bowel sounds are normal. He exhibits distension. There is no tenderness.   Musculoskeletal: Tenderness: BLE.   Neurological: He is alert and oriented to person, place, and time.   Skin: Skin is warm and dry.   -L IJ Permacath site no signs of tunnel or exit site infection. Did not remove dressing.  -Right necrotic toes 1-3-similar to previous   -Left great toe necrotic -mild erythema (someone accidentally stepped on toe last week)       Psychiatric: He has a normal mood and affect.       Significant Labs:  All labs within the past 24 hours have been reviewed.    Significant Imaging:  Labs: Reviewed

## 2018-05-29 NOTE — ED NOTES
Gown changed, linens changed, and pt repositioned for comfort. Pt is in no acute distress at this time. VSS. Pt remains on cardiac monitor and pulse ox with blood pressure cycling every thirty minutes. Pt offers no complaints at this time. Pt and spouse up to date on plan of care. VSS. Will continue to monitor

## 2018-05-29 NOTE — SUBJECTIVE & OBJECTIVE
Interval History:    Admitted this morning with reported fever of 101 without cough, fever or chills. Objective T max today is 100.1 to resolve without tylenol. Was otherwise doing well at rehab before being brought to ED for fever evaluation. Denies sick contact, diarrhea, confusion or rash. Mild hypotension noted this morning but patient had not taken his midodrine. Patient has been having HD session MWF without any complication since d/c. Tacrolimus level today was 4.5    Continuous Infusions:   sodium chloride 0.9%       Scheduled Meds:   aspirin  81 mg Oral Daily    ceFEPime (MAXIPIME) IVPB  1 g Intravenous Q12H    cetirizine  5 mg Oral Daily    escitalopram oxalate  20 mg Oral Daily    gabapentin  100 mg Oral TID    heparin (porcine)  5,000 Units Subcutaneous Q8H    insulin detemir U-100  8 Units Subcutaneous BID    levalbuterol  0.63 mg Nebulization Q4H WAKE    levothyroxine  137 mcg Oral Before breakfast    magnesium oxide  400 mg Oral Daily    midodrine  10 mg Oral TID WM    mycophenolate  250 mg Oral BID    pantoprazole  40 mg Oral Daily    polyethylene glycol  17 g Oral BID    pravastatin  40 mg Oral Daily    predniSONE  2.5 mg Oral Daily    QUEtiapine  50 mg Oral QHS    tacrolimus  4 mg Oral BID     PRN Meds:ALPRAZolam, benzonatate, dextrose 50%, dextrose 50%, glucagon (human recombinant), glucose, glucose, insulin aspart U-100, magnesium sulfate IVPB, ondansetron, oxyCODONE, sodium phosphate IVPB, sodium phosphate IVPB, sodium phosphate IVPB    Review of patient's allergies indicates:   Allergen Reactions    No known drug allergies      Objective:     Vital Signs (Most Recent):  Temp: 98.4 °F (36.9 °C) (05/29/18 1020)  Pulse: (!) 112 (05/29/18 1422)  Resp: 19 (05/29/18 1422)  BP: 99/63 (05/29/18 1422)  SpO2: 98 % (05/29/18 1422) Vital Signs (24h Range):  Temp:  [98.2 °F (36.8 °C)-100.1 °F (37.8 °C)] 98.4 °F (36.9 °C)  Pulse:  [108-123] 112  Resp:  [13-19] 19  SpO2:  [93 %-100 %] 98  %  BP: ()/(52-67) 99/63     Patient Vitals for the past 72 hrs (Last 3 readings):   Weight   05/29/18 0152 79.9 kg (176 lb 2.4 oz)     Body mass index is 27.59 kg/m².    No intake or output data in the 24 hours ending 05/29/18 1514    Hemodynamic Parameters:       Telemetry:     Physical Exam   Constitutional: He is oriented to person, place, and time.   Frail but improved than on prior d/c.  Appropriate and engaging during interview   Eyes: Pupils are equal, round, and reactive to light.   Neck: No JVD present.   Cardiovascular: Normal rate and regular rhythm.    No murmur heard.  Tachycardia present   Pulmonary/Chest: Effort normal and breath sounds normal.   Abdominal: Soft. Bowel sounds are normal.   No fluid wave   Musculoskeletal: He exhibits no edema or tenderness.   Neurological: He is alert and oriented to person, place, and time.   Skin: Skin is warm and dry.   Psychiatric: He has a normal mood and affect.   Nursing note reviewed.        Significant Labs:  CBC:    Recent Labs  Lab 05/29/18 0303   WBC 7.52   RBC 2.59*   HGB 7.8*   HCT 26.0*      *   MCH 30.1   MCHC 30.0*     BNP:    Recent Labs  Lab 05/29/18 0303   BNP 1,228*     CMP:    Recent Labs  Lab 05/29/18 0303      CALCIUM 9.0   ALBUMIN 2.5*   PROT 6.2      K 3.8   CO2 25      BUN 28*   CREATININE 4.2*   ALKPHOS 151*   ALT 12   AST 20   BILITOT 0.5      Coagulation:   No results for input(s): PT, INR, APTT in the last 168 hours.  LDH:  No results for input(s): LDH in the last 72 hours.  Microbiology:  Microbiology Results (last 7 days)     Procedure Component Value Units Date/Time    Blood culture #2 **CANNOT BE ORDERED STAT** [[id number]] Collected:  05/29/18 0319    Order Status:  Completed Specimen:  Blood from Peripheral, Hand, Left Updated:  05/29/18 1115     Blood Culture, Routine No Growth to date    Blood culture #1 **CANNOT BE ORDERED STAT** [[id number]] Collected:  05/29/18 0303    Order Status:   Completed Specimen:  Blood from Peripheral, Wrist, Right Updated:  05/29/18 1115     Blood Culture, Routine No Growth to date    Respiratory Viral Panel by PCR [facility]; Sputum [[id number]]     Order Status:  No result Specimen:  Respiratory           I have reviewed all pertinent labs within the past 24 hours.    Estimated Creatinine Clearance: 22.7 mL/min (A) (based on SCr of 4.2 mg/dL (H)).    Diagnostic Results:

## 2018-05-29 NOTE — CONSULTS
Brief Cards Consult Note    Mr. Crocker is a 42 year old man s/p OHT 2014 with a complicated course including a chronic pleural effusion who was recently discharged after 2 months in hospital for respiratory failure 2/2 pneumonia presents this evening with fever and lethargy. Brought in by wife. Other than cough, no localizing symptoms. Hemodynamically stable in ER with no obvious sources.     Cr up to 4.2 from 3.5. WBC normal.     Will pan-culture, start broad spectrum antibiotics and check CT chest without contrast given fever with cough.    Discussed with Dr. Romero and on-call [facility] fellow, Dr. Neal    Thank you for this interesting consult. Further recommendations per attending addendum    Varghese Hess MD  PGY-[phone])  Cardiology Fellow

## 2018-05-29 NOTE — ASSESSMENT & PLAN NOTE
- agree with vanc and cefepime for now  - will follow up pending cx  - recommend podiatry consult for gangrenous toes  - recommend wound care consult for sacral ulcer

## 2018-05-29 NOTE — ASSESSMENT & PLAN NOTE
Persistent right sided pleural effusion  Ct chest show mild amount.   Defer further romero for now,. Review after HD

## 2018-05-29 NOTE — ASSESSMENT & PLAN NOTE
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 Wound care consulted

## 2018-05-29 NOTE — ED NOTES
I acknowledge care of this pt. Pt AAO x 4 lying in stretcher. Pt on continuous cardiac monitor and pulse ox with blood pressure cycling every thirty minutes. Respirations are unlabored and equal. Side rails up x 2 bed locked and in low position with call light in reach. Pt and family aware of plan of care. VSS. NAD noted. Will continue to monitor

## 2018-05-29 NOTE — ASSESSMENT & PLAN NOTE
- on HD M, W, F  - BNP elevated > 1200  - nephrology to do slow UF 100mL/hr for 24hrs and review  - denies congestion

## 2018-05-29 NOTE — HPI
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 She denies him having any SOB, chest pain, diarrhea, nausea, vomiting. He doesnot make any urine since he has been non HD. Fever was noted to be as high as 101.6.She also notes that he has been coughing since being in ER but not before that.No SOB with PT at rehab.No dizziness or syncope. Wife reports he got an extra HD on Thursday because nephrology wanted to remove extra fluid.He has had a good appetite and tolerating PO and per wife has been working with PT and getting stronger. He has finished his abx course from last visit already.

## 2018-05-29 NOTE — H&P
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 She denies him having any SOB, chest pain, diarrhea, nausea, vomiting. He doesnot make any urine since he has been non HD. Fever was noted to be as high as 101.6.She also notes that he has been coughing since being in ER but not before that.No SOB with PT at rehab.No dizziness or syncope. Wife reports he got an extra HD on Thursday because nephrology wanted to remove extra fluid.He has had a good appetite and tolerating PO and per wife has been working with PT and getting stronger. He has finished his abx course from last visit already.    Past Medical History:   Diagnosis Date    Anxiety     Arthritis     Ascites     Thought to be 2/2 heart tpx; liver bx 3/31/17 w/o significant fibrosis    Cardiomyopathy     Depression     Controlled "for the most part" on Lexipro    Encounter for blood transfusion     during transplant    GERD (gastroesophageal reflux disease)     Hashimoto's disease     History of CHF (congestive heart failure)     Previously EF 20% (prior to heart transplant); last EF 60%, PAP 41 as of 12/12/17; throught to be 2/2 genetics & DM1    History of coronary artery disease     H/o Coronary artery disease; " resolved since heart transplant 2014    History of myocardial infarction     h/o MI x3 prior to heart transplant    HLD (hyperlipidemia) 6/11/2015    Hx of psychiatric care     Hypoglycemia unawareness in type 1 diabetes mellitus     Hypothyroid     Initially hyperthyroid 2/2 Hoshimoto's thyroiditis; now on levothyroxine 150 mcg qd    Pleural effusion due to another disorder     Thought to be 2/2 heart tpx; s/p PleuRx catheter placement and removal after 1-2 months    Psychiatric problem     Pulmonary hypertension assoc with unclear multi-factorial mechanisms 12/12/2017    PAP 41 (EF 60%) on ECHO 12/12/17    Syncope 6/30/2015    Type I diabetes mellitus, well controlled     Well controlled; Dx'd @5y/o; on N insulin 20U BID & Humalog 10U w/meals +SSI; Glu 89 11/9/17; A1c 7.2% 4/5/17    Unspecified essential hypertension 05/29/2014    Well controlled; Last /57 on 11/9/17       Past Surgical History:   Procedure Laterality Date    CARDIAC CATHETERIZATION      CARDIAC SURGERY      CHOLECYSTECTOMY      COLONOSCOPY N/A 3/13/2018    Procedure: COLONOSCOPY;  Surgeon: Tim Spencer MD;  Location: [facility] ([address] [place] [address]);  Service: Endoscopy;  Laterality: N/A;    CORONARY ANGIOPLASTY      FOOT SPLIT TRANSFER OF THE POSTERIOR TIBIALIS TENDON PROCEDURE      HEART TRANSPLANT  2014    KNEE SURGERY      PleuRx catheter placement  01/11/2018    Plan for removal after 1-2 months by Dr. James in cardiothoracic surgery    s/p oht  November 2014    stent placemnet         Review of patient's allergies indicates:   Allergen Reactions    No known drug allergies        Current Facility-Administered Medications   Medication    ALPRAZolam tablet 0.5 mg    aspirin EC tablet 81 mg    benzonatate capsule 100 mg    cetirizine tablet 5 mg    escitalopram oxalate tablet 20 mg    gabapentin capsule 100 mg    insulin detemir U-100 pen 8 Units    levalbuterol nebulizer solution 0.63 mg    levothyroxine  tablet 137 mcg    magnesium oxide tablet 400 mg    midodrine tablet 10 mg    mycophenolate capsule 250 mg    ondansetron disintegrating tablet 8 mg    oxyCODONE immediate release tablet 5 mg    pantoprazole EC tablet 40 mg    polyethylene glycol packet 17 g    pravastatin tablet 40 mg    predniSONE tablet 2.5 mg    QUEtiapine tablet 50 mg    tacrolimus capsule 3 mg     Current Outpatient Prescriptions   Medication Sig    albuterol-ipratropium (DUO-NEB) 2.5 mg-0.5 mg/3 mL nebulizer solution Take 3 mLs by nebulization every 4 (four) hours as needed for Wheezing. Rescue    ALPRAZolam (XANAX) 0.5 MG tablet Take 1 tablet (0.5 mg total) by mouth every 6 (six) hours as needed for Anxiety.    aspirin (ECOTRIN) 81 MG EC tablet Take 1 tablet (81 mg total) by mouth once daily.    cetirizine (ZYRTEC) 5 MG tablet Take 1 tablet (5 mg total) by mouth once daily.    escitalopram oxalate (LEXAPRO) 20 MG tablet Take 1 tablet (20 mg total) by mouth once daily.    gabapentin (NEURONTIN) 300 MG capsule Take 3 capsules (900 mg total) by mouth 3 (three) times daily.    insulin aspart U-100 (NOVOLOG) 100 unit/mL InPn pen Inject 4 Units into the skin 3 (three) times daily.    insulin detemir U-100 (LEVEMIR FLEXTOUCH) 100 unit/mL (3 mL) SubQ InPn pen Inject 8 Units into the skin 2 (two) times daily.    levalbuterol (XOPENEX) 0.63 mg/3 mL nebulizer solution Take 3 mLs (0.63 mg total) by nebulization every 4 (four) hours while awake. Rescue    levothyroxine (SYNTHROID) 137 MCG Tab tablet Take 1 tablet (137 mcg total) by mouth before breakfast.    magnesium oxide (MAG-OX) 400 mg tablet Take 1 tablet (400 mg total) by mouth once daily.    midodrine (PROAMATINE) 10 MG tablet Take 1 tablet (10 mg total) by mouth 3 (three) times daily with meals.    mycophenolate (CELLCEPT) 250 mg Cap Take 1 capsule (250 mg total) by mouth 2 (two) times daily.    ondansetron (ZOFRAN-ODT) 4 MG TbDL Take 2 tablets (8 mg total) by mouth every  "8 (eight) hours as needed (nausea).    oxyCODONE (ROXICODONE) 5 MG immediate release tablet Take 1 tablet (5 mg total) by mouth every 6 (six) hours as needed.    pantoprazole (PROTONIX) 40 MG tablet TAKE ONE TABLET BY MOUTH EVERY DAY    pravastatin (PRAVACHOL) 40 MG tablet Take 1 tablet (40 mg total) by mouth once daily.    predniSONE (DELTASONE) 2.5 MG tablet Take 1 tablet (2.5 mg total) by mouth once daily. S/P Heart Transplant 11/18/2014 ICD-10 Z94.1    QUEtiapine (SEROQUEL) 50 MG tablet Take 1 tablet (50 mg total) by mouth every evening.    tacrolimus (PROGRAF) 1 MG Cap Taked 3mg orally in the morning and 3mg orally in the evening. S/p heart transplant  11/18/2014  ICD-10 Z94.1    dronabinol (MARINOL) 2.5 MG capsule Take 1 capsule (2.5 mg total) by mouth 2 (two) times daily.    lancets Misc 1 Device by Misc.(Non-Drug; Combo Route) route 4 (four) times daily with meals and nightly.    midodrine (PROAMATINE) 5 MG Tab Take 3 tablets (15 mg total) by mouth as needed (30 minutes prior to HD).    pen needle, diabetic 32 gauge x 5/32" Ndle Uses 5 pen needles a day    polyethylene glycol (GLYCOLAX) 17 gram PwPk Take 17 g by mouth 2 (two) times daily.     Family History     Problem Relation (Age of Onset)    Cancer Brother (50)    Depression Mother    Diabetes Mother, Father, Brother, Son, Sister, Maternal Uncle, Paternal Aunt, Maternal Grandmother, Maternal Grandfather    Heart disease Mother, Brother    Hypertension Mother, Father, Brother    Kidney disease Mother, Father    Stroke Father, Brother    Vision loss Brother        Social History Main Topics    Smoking status: Never Smoker    Smokeless tobacco: Never Used    Alcohol use No    Drug use: No    Sexual activity: Yes     Partners: Female     Review of Systems   All other systems reviewed and are negative.    Objective:     Vital Signs (Most Recent):  Temp: 99 °F (37.2 °C) (05/29/18 0321)  Pulse: (!) 113 (05/29/18 0402)  Resp: 17 (05/29/18 " 0402)  BP: (!) 101/55 (05/29/18 0402)  SpO2: 100 % (05/29/18 0402) Vital Signs (24h Range):  Temp:  [99 °F (37.2 °C)-100.1 °F (37.8 °C)] 99 °F (37.2 °C)  Pulse:  [113-123] 113  Resp:  [16-18] 17  SpO2:  [94 %-100 %] 100 %  BP: ()/(55-59) 101/55     Patient Vitals for the past 72 hrs (Last 3 readings):   Weight   05/29/18 0152 79.9 kg (176 lb 2.4 oz)     Body mass index is 27.59 kg/m².    No intake or output data in the 24 hours ending 05/29/18 0456    Physical Exam  General: drowsy but arousable, oriented to person and place.gets confused somewhat at date initially but remembers later  Eyes:PERRL.   Neck:no JVD , tach insertion site looks clean with no discharge  Lungs: Decreased BS right lung   Cardiovascular: Heart: tachycardic with regular rhythm, S1, S2 normal, no murmur, click, rub or gallop.   Chest Wall: no tenderness.   Pulses-1+ b/l UE and DP LE  Extremities: no cyanosis or edema. Gangrenous toes (dry gangrene-from pressor use) right big toe , 2nd toe and left big toe  Abdomen/Rectal: Abdomen: soft, non-tender non-distented; bowel sounds normal  Neurologic: generalized weakness but no focal deficits.  Significant Labs:  CBC:    Recent Labs  Lab 05/29/18 0303   WBC 7.52   RBC 2.59*   HGB 7.8*   HCT 26.0*      *   MCH 30.1   MCHC 30.0*     BNP:    Recent Labs  Lab 05/29/18 0303   BNP 1,228*     CMP:    Recent Labs  Lab 05/29/18 0303      CALCIUM 9.0   ALBUMIN 2.5*   PROT 6.2      K 3.8   CO2 25      BUN 28*   CREATININE 4.2*   ALKPHOS 151*   ALT 12   AST 20   BILITOT 0.5      Coagulation:   No results for input(s): PT, INR, APTT in the last 168 hours.  LDH:  No results for input(s): LDH in the last 72 hours.  Microbiology:  Microbiology Results (last 7 days)     Procedure Component Value Units Date/Time    Blood culture #2 **CANNOT BE ORDERED STAT** [[id number]] Collected:  05/29/18 0319    Order Status:  Sent Specimen:  Blood from Peripheral, Hand, Left Updated:   05/29/18 0345    Blood culture #1 **CANNOT BE ORDERED STAT** [[id number]] Collected:  05/29/18 0303    Order Status:  Sent Specimen:  Blood from Peripheral, Wrist, Right Updated:  05/29/18 0314          I have reviewed all pertinent labs within the past 24 hours.    Diagnostic Results:  I have reviewed all pertinent imaging results/findings within the past 24 hours.     CXR-right pleural effusion and increased edema     EKG-Sinus tachycardia and inferior TWI +TWI in L2-X3-[id number] from prior EKG    Assessment/Plan:     Fever    -blood cx drawn in ED  -patient is anuric  -CT chest without contrast  -start broad spectrum antibiotics        Gangrene    Dry gangrene developed previous admission with pressor use        ESRD (end stage renal disease)    -on HD M, W, F  -will consult nephrology for inpatient HD  -continue midodrine with HD        Pleural effusion    Persistent right sided pleural effusion  Ct chest without contrast        Type 1 diabetes mellitus with stage 3 chronic kidney disease    Insulin sliding scale  -continue detemir        Hypothyroidism due to Hashimoto's thyroiditis    -continue synthroid at home dose        Anemia    -hb 8-9 at time of discharge  -denies any bleeding  -continue to monitor        Heart transplanted    S/p OHT 2014  Continue prednisone and cellcept and tacrolimus  Check tacro level            Silvio Neal MD  Heart Transplant  [facility]

## 2018-05-29 NOTE — ED NOTES
Side rails up x 2 bed locked and in low position with call light in reach. Pt resting, awakens easily to voice. VSS. NAD. Will continue to monitor

## 2018-05-29 NOTE — ASSESSMENT & PLAN NOTE
- subjective and ? Documented at rehab.   - Not present since admission. T max in the ER was 100.1 and no tylenol was given. Resolved so far  - CT scan + for congestion but equivocal for pneumonia. Present of mild small R- pleural effusion  - No leukocytosis   - blood cx pending results  -patient is anuric  -continue vanco and cefepime until evaluated by ID. Spoke with Dr. Lema who will make recs

## 2018-05-29 NOTE — ED NOTES
Pt and spouse aware awaiting a room to become available on admitting unit. NAD. Pt offers no complaints at this time. Will continue to monitor

## 2018-05-29 NOTE — HPI
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 with recurrent pleural effusions/ascites requiring monthly thoracenteses/paracenteses (last paracentesis 5/15/18 3.5 L), VATS 1/11/18 with Pleur-X catheter (now removed), and recent admission for diarrhea and RSV complicated with respiratory failure requiring intubation/trach/PEG (both Trach/PEG removed 5/10) and CACHORRO on superimposed on CKD stage 4 secondary to prograft toxcity?/ischemic ATN (see last admission consult 3/11-5/18/18-discharge to rehab) now dialysis dependent via [facility] (since 3/14/2018) who is admitted from [facility] for fever 100-101.6 since Friday night. Denies PND, rhinorrhea, cough or shortness of breath. Wife reports doing well at Rehab up -transfers and parallel bars.  Eating well since appetite stimulator started at rehab. Had HD 4 days last week MWTF due to volume excess. Last HD yesterday 3 L removed. Denies L IJ site tenderness.     CT chest wo contrast showed patchy subsegmental opacities through out lungs, mod volume right sided pleural effusion with trace left side pleural fluid and persistent round RLL opacity, new mod t12 compression deformity, and small volume abdominal ascites. Lactate 0.9. Blood cultures collected in ED. Vancomycin started in ED. SBP , Tacy 111's,  % on 2 L NC.

## 2018-05-29 NOTE — ED NOTES
Pt lying in stretcher resting, NAD. No complaints at this time. VSS. Pt and spouse updated on plan of care

## 2018-05-29 NOTE — ASSESSMENT & PLAN NOTE
-S/p OHT 2014  -Continue prednisone and cellcept and tacrolimus  Tacro level 4.5 today  - increased dose to 4/4 from this evening

## 2018-05-29 NOTE — ASSESSMENT & PLAN NOTE
-hb 8-9 at time of discharge  - of chronic disease  -denies any bleeding  -continue to monitor  - consider iron infusion or oral replacement

## 2018-05-29 NOTE — SUBJECTIVE & OBJECTIVE
"Past Medical History:   Diagnosis Date    Anxiety     Arthritis     Ascites     Thought to be 2/2 heart tpx; liver bx 3/31/17 w/o significant fibrosis    Cardiomyopathy     Depression     Controlled "for the most part" on Lexipro    Encounter for blood transfusion     during transplant    GERD (gastroesophageal reflux disease)     Hashimoto's disease     History of CHF (congestive heart failure)     Previously EF 20% (prior to heart transplant); last EF 60%, PAP 41 as of 12/12/17; throught to be 2/2 genetics & DM1    History of coronary artery disease     H/o Coronary artery disease; resolved since heart transplant 2014    History of myocardial infarction     h/o MI x3 prior to heart transplant    HLD (hyperlipidemia) 6/11/2015    Hx of psychiatric care     Hypoglycemia unawareness in type 1 diabetes mellitus     Hypothyroid     Initially hyperthyroid 2/2 Hoshimoto's thyroiditis; now on levothyroxine 150 mcg qd    Pleural effusion due to another disorder     Thought to be 2/2 heart tpx; s/p PleuRx catheter placement and removal after 1-2 months    Psychiatric problem     Pulmonary hypertension assoc with unclear multi-factorial mechanisms 12/12/2017    PAP 41 (EF 60%) on ECHO 12/12/17    Syncope 6/30/2015    Type I diabetes mellitus, well controlled     Well controlled; Dx'd @7y/o; on N insulin 20U BID & Humalog 10U w/meals +SSI; Glu 89 11/9/17; A1c 7.2% 4/5/17    Unspecified essential hypertension 05/29/2014    Well controlled; Last /57 on 11/9/17       Past Surgical History:   Procedure Laterality Date    CARDIAC CATHETERIZATION      CARDIAC SURGERY      CHOLECYSTECTOMY      COLONOSCOPY N/A 3/13/2018    Procedure: COLONOSCOPY;  Surgeon: Tim Spencer MD;  Location: [facility] ([address] [place] [address]);  Service: Endoscopy;  Laterality: N/A;    CORONARY ANGIOPLASTY      FOOT SPLIT TRANSFER OF THE POSTERIOR TIBIALIS TENDON PROCEDURE      HEART TRANSPLANT  2014    KNEE SURGERY      PleuRx " catheter placement  01/11/2018    Plan for removal after 1-2 months by Dr. James in cardiothoracic surgery    s/p oht  November 2014    stent placemnet         Review of patient's allergies indicates:   Allergen Reactions    No known drug allergies        Medications:    (Not in a hospital admission)  Antibiotics     Start     Stop Route Frequency Ordered    05/29/18 0645  ceFEPIme injection 1 g      -- IV Every 12 hours (non-standard times) 05/29/18 0539        Antifungals     None        Antivirals     None           Immunization History   Administered Date(s) Administered    Hepatitis A / Hepatitis B 12/07/2012, 02/28/2017, 08/24/2017, 10/09/2017    Influenza - High Dose 11/10/2015, 10/02/2017    Influenza - Quadrivalent - PF 12/07/2012    Influenza - Trivalent - PF (ADULT) 12/07/2012    Influenza A (H1N1) 2009 Monovalent - IM - PF 11/12/2009    PPD Test 03/23/2018, 05/10/2018    Pneumococcal Conjugate - 13 Valent 10/02/2017    Pneumococcal Polysaccharide - 23 Valent 12/07/2012    Tdap 12/07/2012    Zoster 12/07/2012    influenza - Quadrivalent 11/18/2016       Family History     Problem Relation (Age of Onset)    Cancer Brother (50)    Depression Mother    Diabetes Mother, Father, Brother, Son, Sister, Maternal Uncle, Paternal Aunt, Maternal Grandmother, Maternal Grandfather    Heart disease Mother, Brother    Hypertension Mother, Father, Brother    Kidney disease Mother, Father    Stroke Father, Brother    Vision loss Brother        Social History     Social History    Marital status:      Spouse name: N/A    Number of children: N/A    Years of education: N/A     Social History Main Topics    Smoking status: Never Smoker    Smokeless tobacco: Never Used    Alcohol use No    Drug use: No    Sexual activity: Yes     Partners: Female     Other Topics Concern    None     Social History Narrative            Breakfast: Skips 80%; cereal; Diet Sprite    Lunch: Turkey or chicken  sandwich on white bread; Diet Sprite    Dinner: Grilled burgers, small amount chips; Diet Sprite    Snacks: Ronny crackers before bed on most nights    Eats out: 2x/wk    Water: 0    PA: Walks 15 minutes (strenuous) daily    Goal weight 170-175 lbs by 1/2018     Review of Systems   Constitutional: Positive for fever. Negative for chills.   HENT: Negative for congestion, ear pain, rhinorrhea and sore throat.    Respiratory: Negative for cough, shortness of breath and wheezing.    Gastrointestinal: Negative for abdominal pain, diarrhea, nausea and vomiting.   Genitourinary:        Anuric   Musculoskeletal: Negative for back pain.        Left great toe pain 2/2 nurse stepping on it and pulling nail back slightly on accident at rehab   Skin: Positive for wound. Negative for rash.   Neurological: Negative for headaches.   Hematological: Negative for adenopathy.     Objective:     Vital Signs (Most Recent):  Temp: 98.4 °F (36.9 °C) (05/29/18 1020)  Pulse: (!) 115 (05/29/18 1139)  Resp: 14 (05/29/18 1139)  BP: 111/64 (05/29/18 1122)  SpO2: 97 % (05/29/18 1139) Vital Signs (24h Range):  Temp:  [98.2 °F (36.8 °C)-100.1 °F (37.8 °C)] 98.4 °F (36.9 °C)  Pulse:  [111-123] 115  Resp:  [13-19] 14  SpO2:  [93 %-100 %] 97 %  BP: ()/(52-64) 111/64     Weight: 79.9 kg (176 lb 2.4 oz)  Body mass index is 27.59 kg/m².    Estimated Creatinine Clearance: 22.7 mL/min (A) (based on SCr of 4.2 mg/dL (H)).    Physical Exam   Constitutional: He is oriented to person, place, and time. He appears well-developed and well-nourished. No distress.   HENT:   Head: Normocephalic and atraumatic.   Eyes: Conjunctivae are normal.   Neck: Normal range of motion. Neck supple.   Cardiovascular: Regular rhythm.  Tachycardia present.    Pulmonary/Chest: Effort normal. No respiratory distress. He has decreased breath sounds in the right middle field and the right lower field. He has rales.   Abdominal: Soft. Bowel sounds are normal. He exhibits no  distension. There is no tenderness. There is no guarding.   Musculoskeletal: He exhibits edema.   Lymphadenopathy:     He has no cervical adenopathy.   Neurological: He is alert and oriented to person, place, and time.   Skin: Skin is warm and dry. Rash noted. He is not diaphoretic. There is erythema.   Hypopigmented macular rash on forehead  Dry gangrene of bilateral great toes. Mild erythema of left great toe and dried blood from nail separation from bed. Toes are TTP   Vitals reviewed.      Significant Labs: All pertinent labs within the past 24 hours have been reviewed.    Significant Imaging: I have reviewed all pertinent imaging results/findings within the past 24 hours.

## 2018-05-29 NOTE — ED NOTES
Patient not sent from [facility] for fever of 101F, patient had heart transplant in 2014. Patient was recently discharged from [facility] after an extended ICU stay. Per wife patient was septic during last stay. Patient is sleeping at this time. Arousable with with repeated stimulation. Patient is a dialysis patient

## 2018-05-29 NOTE — CONSULTS
[facility]  Infectious Disease  Consult Note    Patient Name: Lv Crocker  MRN: [id number]  Admission Date: 5/29/2018  Hospital Length of Stay: 0 days  Attending Physician: Corey Navas MD  Primary Care Provider: Philippe Mohr MD     Isolation Status: No active isolations    Patient information was obtained from patient and past medical records.      Consults  Assessment/Plan:     Fever    - agree with vanc and cefepime for now  - will follow up pending cx  - recommend podiatry consult for gangrenous toes  - recommend wound care consult for sacral ulcer            Thank you for your consult. I will follow-up with patient. Please contact us if you have any additional questions.    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 Patient reports low grade fevers that have not lasted over an hour. He denies cough, dyspnea, sore throat, rhinorrhea, abdominal pain, n/v, diarrhea. He reports the ulcers/gangrenous toes have improved since discharge. He also reports a sacral ulcer that wound care has been following and denies drainage from the area.       Past Medical History:   Diagnosis Date    Anxiety     Arthritis      "Ascites     Thought to be 2/2 heart tpx; liver bx 3/31/17 w/o significant fibrosis    Cardiomyopathy     Depression     Controlled "for the most part" on Lexipro    Encounter for blood transfusion     during transplant    GERD (gastroesophageal reflux disease)     Hashimoto's disease     History of CHF (congestive heart failure)     Previously EF 20% (prior to heart transplant); last EF 60%, PAP 41 as of 12/12/17; throught to be 2/2 genetics & DM1    History of coronary artery disease     H/o Coronary artery disease; resolved since heart transplant 2014    History of myocardial infarction     h/o MI x3 prior to heart transplant    HLD (hyperlipidemia) 6/11/2015    Hx of psychiatric care     Hypoglycemia unawareness in type 1 diabetes mellitus     Hypothyroid     Initially hyperthyroid 2/2 Hoshimoto's thyroiditis; now on levothyroxine 150 mcg qd    Pleural effusion due to another disorder     Thought to be 2/2 heart tpx; s/p PleuRx catheter placement and removal after 1-2 months    Psychiatric problem     Pulmonary hypertension assoc with unclear multi-factorial mechanisms 12/12/2017    PAP 41 (EF 60%) on ECHO 12/12/17    Syncope 6/30/2015    Type I diabetes mellitus, well controlled     Well controlled; Dx'd @5y/o; on N insulin 20U BID & Humalog 10U w/meals +SSI; Glu 89 11/9/17; A1c 7.2% 4/5/17    Unspecified essential hypertension 05/29/2014    Well controlled; Last /57 on 11/9/17       Past Surgical History:   Procedure Laterality Date    CARDIAC CATHETERIZATION      CARDIAC SURGERY      CHOLECYSTECTOMY      COLONOSCOPY N/A 3/13/2018    Procedure: COLONOSCOPY;  Surgeon: Tim Spencer MD;  Location: [facility] ([address] [place] [address]);  Service: Endoscopy;  Laterality: N/A;    CORONARY ANGIOPLASTY      FOOT SPLIT TRANSFER OF THE POSTERIOR TIBIALIS TENDON PROCEDURE      HEART TRANSPLANT  2014    KNEE SURGERY      PleuRx catheter placement  01/11/2018    Plan for removal after 1-2 months by  " Jacob in cardiothoracic surgery    s/p oht  November 2014    stent placemnet         Review of patient's allergies indicates:   Allergen Reactions    No known drug allergies        Medications:    (Not in a hospital admission)  Antibiotics     Start     Stop Route Frequency Ordered    05/29/18 0645  ceFEPIme injection 1 g      -- IV Every 12 hours (non-standard times) 05/29/18 0539        Antifungals     None        Antivirals     None           Immunization History   Administered Date(s) Administered    Hepatitis A / Hepatitis B 12/07/2012, 02/28/2017, 08/24/2017, 10/09/2017    Influenza - High Dose 11/10/2015, 10/02/2017    Influenza - Quadrivalent - PF 12/07/2012    Influenza - Trivalent - PF (ADULT) 12/07/2012    Influenza A (H1N1) 2009 Monovalent - IM - PF 11/12/2009    PPD Test 03/23/2018, 05/10/2018    Pneumococcal Conjugate - 13 Valent 10/02/2017    Pneumococcal Polysaccharide - 23 Valent 12/07/2012    Tdap 12/07/2012    Zoster 12/07/2012    influenza - Quadrivalent 11/18/2016       Family History     Problem Relation (Age of Onset)    Cancer Brother (50)    Depression Mother    Diabetes Mother, Father, Brother, Son, Sister, Maternal Uncle, Paternal Aunt, Maternal Grandmother, Maternal Grandfather    Heart disease Mother, Brother    Hypertension Mother, Father, Brother    Kidney disease Mother, Father    Stroke Father, Brother    Vision loss Brother        Social History     Social History    Marital status:      Spouse name: N/A    Number of children: N/A    Years of education: N/A     Social History Main Topics    Smoking status: Never Smoker    Smokeless tobacco: Never Used    Alcohol use No    Drug use: No    Sexual activity: Yes     Partners: Female     Other Topics Concern    None     Social History Narrative            Breakfast: Skips 80%; cereal; Diet Sprite    Lunch: Turkey or chicken sandwich on white bread; Diet Sprite    Dinner: Grilled burgers, small  amount chips; Diet Sprite    Snacks: Ronny crackers before bed on most nights    Eats out: 2x/wk    Water: 0    PA: Walks 15 minutes (strenuous) daily    Goal weight 170-175 lbs by 1/2018     Review of Systems   Constitutional: Positive for fever. Negative for chills.   HENT: Negative for congestion, ear pain, rhinorrhea and sore throat.    Respiratory: Negative for cough, shortness of breath and wheezing.    Gastrointestinal: Negative for abdominal pain, diarrhea, nausea and vomiting.   Genitourinary:        Anuric   Musculoskeletal: Negative for back pain.        Left great toe pain 2/2 nurse stepping on it and pulling nail back slightly on accident at rehab   Skin: Positive for wound. Negative for rash.   Neurological: Negative for headaches.   Hematological: Negative for adenopathy.     Objective:     Vital Signs (Most Recent):  Temp: 98.4 °F (36.9 °C) (05/29/18 1020)  Pulse: (!) 115 (05/29/18 1139)  Resp: 14 (05/29/18 1139)  BP: 111/64 (05/29/18 1122)  SpO2: 97 % (05/29/18 1139) Vital Signs (24h Range):  Temp:  [98.2 °F (36.8 °C)-100.1 °F (37.8 °C)] 98.4 °F (36.9 °C)  Pulse:  [111-123] 115  Resp:  [13-19] 14  SpO2:  [93 %-100 %] 97 %  BP: ()/(52-64) 111/64     Weight: 79.9 kg (176 lb 2.4 oz)  Body mass index is 27.59 kg/m².    Estimated Creatinine Clearance: 22.7 mL/min (A) (based on SCr of 4.2 mg/dL (H)).    Physical Exam   Constitutional: He is oriented to person, place, and time. He appears well-developed and well-nourished. No distress.   HENT:   Head: Normocephalic and atraumatic.   Eyes: Conjunctivae are normal.   Neck: Normal range of motion. Neck supple.   Cardiovascular: Regular rhythm.  Tachycardia present.    Pulmonary/Chest: Effort normal. No respiratory distress. He has decreased breath sounds in the right middle field and the right lower field. He has rales.   Abdominal: Soft. Bowel sounds are normal. He exhibits no distension. There is no tenderness. There is no guarding.    Musculoskeletal: He exhibits edema.   Lymphadenopathy:     He has no cervical adenopathy.   Neurological: He is alert and oriented to person, place, and time.   Skin: Skin is warm and dry. Rash noted. He is not diaphoretic. There is erythema.   Hypopigmented macular rash on forehead  Dry gangrene of bilateral great toes. Mild erythema of left great toe and dried blood from nail separation from bed. Toes are TTP   Vitals reviewed.      Significant Labs: All pertinent labs within the past 24 hours have been reviewed.    Significant Imaging: I have reviewed all pertinent imaging results/findings within the past 24 hours.

## 2018-05-29 NOTE — PROGRESS NOTES
[facility]  Heart Transplant  Progress Note    Patient Name: Lv Crocker  MRN: [id number]  Admission Date: 5/29/2018  Hospital Length of Stay: 0 days  Attending Physician: Corey Navas MD  Primary Care Provider: Philippe Mohr MD  Principal Problem:Fever    Subjective:     Addendum:  ID saw the patient and they are ok with Cefepime and vanco for now. Patient had one dose vancomycin 1gm this morning. Talked to Pharmacycarlos who will assist in managing vancomycin for this HD patient.    Interval History:    Admitted this morning with reported fever of 101 without cough, fever or chills. Objective T max today is 100.1 to resolve without tylenol. Was otherwise doing well at rehab before being brought to ED for fever evaluation. Denies sick contact, diarrhea, confusion or rash. Mild hypotension noted this morning but patient had not taken his midodrine. Patient has been having HD session MWF without any complication since d/c. Tacrolimus level today was 4.5    Continuous Infusions:   sodium chloride 0.9%       Scheduled Meds:   aspirin  81 mg Oral Daily    ceFEPime (MAXIPIME) IVPB  1 g Intravenous Q12H    cetirizine  5 mg Oral Daily    escitalopram oxalate  20 mg Oral Daily    gabapentin  100 mg Oral TID    heparin (porcine)  5,000 Units Subcutaneous Q8H    insulin detemir U-100  8 Units Subcutaneous BID    levalbuterol  0.63 mg Nebulization Q4H WAKE    levothyroxine  137 mcg Oral Before breakfast    magnesium oxide  400 mg Oral Daily    midodrine  10 mg Oral TID WM    mycophenolate  250 mg Oral BID    pantoprazole  40 mg Oral Daily    polyethylene glycol  17 g Oral BID    pravastatin  40 mg Oral Daily    predniSONE  2.5 mg Oral Daily    QUEtiapine  50 mg Oral QHS    tacrolimus  4 mg Oral BID     PRN Meds:ALPRAZolam, benzonatate, dextrose 50%, dextrose 50%, glucagon (human recombinant), glucose, glucose, insulin aspart U-100, magnesium sulfate IVPB, ondansetron,  oxyCODONE, sodium phosphate IVPB, sodium phosphate IVPB, sodium phosphate IVPB    Review of patient's allergies indicates:   Allergen Reactions    No known drug allergies      Objective:     Vital Signs (Most Recent):  Temp: 98.4 °F (36.9 °C) (05/29/18 1020)  Pulse: (!) 112 (05/29/18 1422)  Resp: 19 (05/29/18 1422)  BP: 99/63 (05/29/18 1422)  SpO2: 98 % (05/29/18 1422) Vital Signs (24h Range):  Temp:  [98.2 °F (36.8 °C)-100.1 °F (37.8 °C)] 98.4 °F (36.9 °C)  Pulse:  [108-123] 112  Resp:  [13-19] 19  SpO2:  [93 %-100 %] 98 %  BP: ()/(52-67) 99/63     Patient Vitals for the past 72 hrs (Last 3 readings):   Weight   05/29/18 0152 79.9 kg (176 lb 2.4 oz)     Body mass index is 27.59 kg/m².    No intake or output data in the 24 hours ending 05/29/18 1514    Hemodynamic Parameters:       Telemetry:     Physical Exam   Constitutional: He is oriented to person, place, and time.   Frail but improved than on prior d/c.  Appropriate and engaging during interview   Eyes: Pupils are equal, round, and reactive to light.   Neck: No JVD present.   Cardiovascular: Normal rate and regular rhythm.    No murmur heard.  Tachycardia present   Pulmonary/Chest: Effort normal and breath sounds normal.   Abdominal: Soft. Bowel sounds are normal.   No fluid wave   Musculoskeletal: He exhibits no edema or tenderness.   Neurological: He is alert and oriented to person, place, and time.   Skin: Skin is warm and dry. Dry gangrene to toes bilaterally. Left great toe gangrene improved and less tender. No drainage  Psychiatric: He has a normal mood and affect.   Nursing note reviewed.        Significant Labs:  CBC:    Recent Labs  Lab 05/29/18  0303   WBC 7.52   RBC 2.59*   HGB 7.8*   HCT 26.0*      *   MCH 30.1   MCHC 30.0*     BNP:    Recent Labs  Lab 05/29/18  0303   BNP 1,228*     CMP:    Recent Labs  Lab 05/29/18  0303      CALCIUM 9.0   ALBUMIN 2.5*   PROT 6.2      K 3.8   CO2 25      BUN 28*    CREATININE 4.2*   ALKPHOS 151*   ALT 12   AST 20   BILITOT 0.5      Coagulation:   No results for input(s): PT, INR, APTT in the last 168 hours.  LDH:  No results for input(s): LDH in the last 72 hours.  Microbiology:  Microbiology Results (last 7 days)     Procedure Component Value Units Date/Time    Blood culture #2 **CANNOT BE ORDERED STAT** [[id number]] Collected:  05/29/18 0319    Order Status:  Completed Specimen:  Blood from Peripheral, Hand, Left Updated:  05/29/18 1115     Blood Culture, Routine No Growth to date    Blood culture #1 **CANNOT BE ORDERED STAT** [[id number]] Collected:  05/29/18 0303    Order Status:  Completed Specimen:  Blood from Peripheral, Wrist, Right Updated:  05/29/18 1115     Blood Culture, Routine No Growth to date    Respiratory Viral Panel by PCR [facility]; Sputum [[id number]]     Order Status:  No result Specimen:  Respiratory           I have reviewed all pertinent labs within the past 24 hours.    Estimated Creatinine Clearance: 22.7 mL/min (A) (based on SCr of 4.2 mg/dL (H)).    Diagnostic Results:        Assessment and Plan:     41 yo male S/P OHTx 11/18/2014, suspected restrictive versus constrictive CMP post-transplant as well as CKD stage IV now progressed to ESRD requiring HD M, W, F, Recent long hospitalization due to septic shock, RSV who presents from inpatient rehab with fever going on for last 3-4 days.Patient was discharged on 5/18/2018 and has been in rehab since-working with PT.Patient's wife is at bedside and provides most of the history-Patient is drowsy from seroquel per wife. She denies him having any SOB, chest pain, diarrhea, nausea, vomiting. He doesnot make any urine since he has been non HD. Fever was noted to be as high as 101.6.She also notes that he has been coughing since being in ER but not before that.No SOB with PT at rehab.No dizziness or syncope. Wife reports he got an extra HD on Thursday because nephrology wanted to remove extra fluid.He has had a  good appetite and tolerating PO and per wife has been working with PT and getting stronger. He has finished his abx course from last visit already.    * Fever    - subjective and ? Documented at rehab at 101 per Ed Note but patient says it was 100.7  - Not present since admission. T max in the ER was 100.1 and no tylenol was given. Resolved so far  - CT scan + for congestion but equivocal for pneumonia. Present of mild small R- pleural effusion  - No leukocytosis   - blood cx pending results. RSV panel ordered  -patient is anuric  -continue vanco and cefepime until evaluated by ID. Spoke with Dr. Lema who will make recs        Physical debility    - secondary to prolonged illness  - PT/OT in patient and d/c back to rehab once cleared          Heart transplanted    -S/p OHT 2014  -Continue prednisone and cellcept and tacrolimus  Tacro level 4.5 today  - increased dose to 4/4 from this evening        Gangrene    -Dry gangrene developed previous admission with pressor use  - Will continue with betadine rx as current. Wound care consulted        ESRD (end stage renal disease)    - on HD M, W, F  - BNP elevated > 1200  - nephrology to do slow UF 100mL/hr for 24hrs and review  - denies congestion        Pleural effusion    Persistent right sided pleural effusion  Ct chest show mild amount.   Defer further romero for now,. Review after HD        Type 1 diabetes mellitus with stage 3 chronic kidney disease    Insulin sliding scale  -continue detemir        Hypothyroidism due to Hashimoto's thyroiditis    - continue synthroid at home dose  - TSH 12.4, T4 4.4   - subclinical hypothyroidism.   - no plan to change home synthroid dose        Anemia    -hb 8-9 at time of discharge  - of chronic disease  -denies any bleeding  -continue to monitor  - consider iron infusion or oral replacement          Discussed care with Dr. dayami Walsh MD  Heart Transplant  [facility]

## 2018-05-29 NOTE — PROVIDER PROGRESS NOTES - EMERGENCY DEPT.
Encounter Date: 5/29/2018    ED Physician Progress Notes        Physician Note:   Dr. Martel from rehab center called, she can be reached through paging  regarding his recent stay at the rehab center.       I, Teresa Rangel, am scribing for, and in the presence of, Dr. Arizmendi. I performed the above scribed service and the documentation accurately describes the services I performed. I attest to the accuracy of the note.

## 2018-05-29 NOTE — HOSPITAL COURSE
5/29  - on vanco and cefepime. ID to see (Dr. Lema)  5/30  - febrile this  Morning. (tmax 102.2)  5/31  - had 1 PRBC for anemia. Bronch tomorrow\  6/11  - having diarrhea, CMV pcr positive. Broch/BAL done today  6/2  - diarrhea improving, C- diff test positive for Marielena and negative tox (overall neg per Id).   6/3  - no change in rx. BAL results in progress    6/11  - awaiting placement to rehab. Diarrhea resolved this morning    Patient had ICU stay for FUO and was treated with empiric abx including vanco and cefepime to become afebrile after 48hrs of rx.  Blood cultures obtained on admission were negative while  A fuHe had a Bronch and BAL with cultures and cytology being negative . He also had UF with 2L fluid removal due to initial admission BNP of >1200. Repeat echo showed mild EF decrease which is consistent to his baseline on review. He had episodes of intermittent diarrhea and studies including c-diff were negative. CMV PCR was weakly positive and repeat test is pending. ID and pharmacy advised no treatment for now. He had foot XR per podiatry to evaluate former healing eschar to the toes which came back negative. He continued to do PT/OT with recommendation to d/c to rehab with HD. We decreased his tacrolimus dose from 5mg BID to 4mg BID per Pharmacy. His goal is 6-9 and rehab will call post tx clinic with results if Tacrolimus level is out of range for adjustment.

## 2018-05-29 NOTE — SUBJECTIVE & OBJECTIVE
"Past Medical History:   Diagnosis Date    Anxiety     Arthritis     Ascites     Thought to be 2/2 heart tpx; liver bx 3/31/17 w/o significant fibrosis    Cardiomyopathy     Depression     Controlled "for the most part" on Lexipro    Encounter for blood transfusion     during transplant    GERD (gastroesophageal reflux disease)     Hashimoto's disease     History of CHF (congestive heart failure)     Previously EF 20% (prior to heart transplant); last EF 60%, PAP 41 as of 12/12/17; throught to be 2/2 genetics & DM1    History of coronary artery disease     H/o Coronary artery disease; resolved since heart transplant 2014    History of myocardial infarction     h/o MI x3 prior to heart transplant    HLD (hyperlipidemia) 6/11/2015    Hx of psychiatric care     Hypoglycemia unawareness in type 1 diabetes mellitus     Hypothyroid     Initially hyperthyroid 2/2 Hoshimoto's thyroiditis; now on levothyroxine 150 mcg qd    Pleural effusion due to another disorder     Thought to be 2/2 heart tpx; s/p PleuRx catheter placement and removal after 1-2 months    Psychiatric problem     Pulmonary hypertension assoc with unclear multi-factorial mechanisms 12/12/2017    PAP 41 (EF 60%) on ECHO 12/12/17    Syncope 6/30/2015    Type I diabetes mellitus, well controlled     Well controlled; Dx'd @7y/o; on N insulin 20U BID & Humalog 10U w/meals +SSI; Glu 89 11/9/17; A1c 7.2% 4/5/17    Unspecified essential hypertension 05/29/2014    Well controlled; Last /57 on 11/9/17       Past Surgical History:   Procedure Laterality Date    CARDIAC CATHETERIZATION      CARDIAC SURGERY      CHOLECYSTECTOMY      COLONOSCOPY N/A 3/13/2018    Procedure: COLONOSCOPY;  Surgeon: Tim Spencer MD;  Location: [facility] ([address] [place] [address]);  Service: Endoscopy;  Laterality: N/A;    CORONARY ANGIOPLASTY      FOOT SPLIT TRANSFER OF THE POSTERIOR TIBIALIS TENDON PROCEDURE      HEART TRANSPLANT  2014    KNEE SURGERY      PleuRx " catheter placement  01/11/2018    Plan for removal after 1-2 months by Dr. James in cardiothoracic surgery    s/p oht  November 2014    stent placemnet         Review of patient's allergies indicates:   Allergen Reactions    No known drug allergies        Current Facility-Administered Medications   Medication    ALPRAZolam tablet 0.5 mg    aspirin EC tablet 81 mg    benzonatate capsule 100 mg    cetirizine tablet 5 mg    escitalopram oxalate tablet 20 mg    gabapentin capsule 100 mg    insulin detemir U-100 pen 8 Units    levalbuterol nebulizer solution 0.63 mg    levothyroxine tablet 137 mcg    magnesium oxide tablet 400 mg    midodrine tablet 10 mg    mycophenolate capsule 250 mg    ondansetron disintegrating tablet 8 mg    oxyCODONE immediate release tablet 5 mg    pantoprazole EC tablet 40 mg    polyethylene glycol packet 17 g    pravastatin tablet 40 mg    predniSONE tablet 2.5 mg    QUEtiapine tablet 50 mg    tacrolimus capsule 3 mg     Current Outpatient Prescriptions   Medication Sig    albuterol-ipratropium (DUO-NEB) 2.5 mg-0.5 mg/3 mL nebulizer solution Take 3 mLs by nebulization every 4 (four) hours as needed for Wheezing. Rescue    ALPRAZolam (XANAX) 0.5 MG tablet Take 1 tablet (0.5 mg total) by mouth every 6 (six) hours as needed for Anxiety.    aspirin (ECOTRIN) 81 MG EC tablet Take 1 tablet (81 mg total) by mouth once daily.    cetirizine (ZYRTEC) 5 MG tablet Take 1 tablet (5 mg total) by mouth once daily.    escitalopram oxalate (LEXAPRO) 20 MG tablet Take 1 tablet (20 mg total) by mouth once daily.    gabapentin (NEURONTIN) 300 MG capsule Take 3 capsules (900 mg total) by mouth 3 (three) times daily.    insulin aspart U-100 (NOVOLOG) 100 unit/mL InPn pen Inject 4 Units into the skin 3 (three) times daily.    insulin detemir U-100 (LEVEMIR FLEXTOUCH) 100 unit/mL (3 mL) SubQ InPn pen Inject 8 Units into the skin 2 (two) times daily.    levalbuterol (XOPENEX) 0.63  "mg/3 mL nebulizer solution Take 3 mLs (0.63 mg total) by nebulization every 4 (four) hours while awake. Rescue    levothyroxine (SYNTHROID) 137 MCG Tab tablet Take 1 tablet (137 mcg total) by mouth before breakfast.    magnesium oxide (MAG-OX) 400 mg tablet Take 1 tablet (400 mg total) by mouth once daily.    midodrine (PROAMATINE) 10 MG tablet Take 1 tablet (10 mg total) by mouth 3 (three) times daily with meals.    mycophenolate (CELLCEPT) 250 mg Cap Take 1 capsule (250 mg total) by mouth 2 (two) times daily.    ondansetron (ZOFRAN-ODT) 4 MG TbDL Take 2 tablets (8 mg total) by mouth every 8 (eight) hours as needed (nausea).    oxyCODONE (ROXICODONE) 5 MG immediate release tablet Take 1 tablet (5 mg total) by mouth every 6 (six) hours as needed.    pantoprazole (PROTONIX) 40 MG tablet TAKE ONE TABLET BY MOUTH EVERY DAY    pravastatin (PRAVACHOL) 40 MG tablet Take 1 tablet (40 mg total) by mouth once daily.    predniSONE (DELTASONE) 2.5 MG tablet Take 1 tablet (2.5 mg total) by mouth once daily. S/P Heart Transplant 11/18/2014 ICD-10 Z94.1    QUEtiapine (SEROQUEL) 50 MG tablet Take 1 tablet (50 mg total) by mouth every evening.    tacrolimus (PROGRAF) 1 MG Cap Taked 3mg orally in the morning and 3mg orally in the evening. S/p heart transplant  11/18/2014  ICD-10 Z94.1    dronabinol (MARINOL) 2.5 MG capsule Take 1 capsule (2.5 mg total) by mouth 2 (two) times daily.    lancets Misc 1 Device by Misc.(Non-Drug; Combo Route) route 4 (four) times daily with meals and nightly.    midodrine (PROAMATINE) 5 MG Tab Take 3 tablets (15 mg total) by mouth as needed (30 minutes prior to HD).    pen needle, diabetic 32 gauge x 5/32" Ndle Uses 5 pen needles a day    polyethylene glycol (GLYCOLAX) 17 gram PwPk Take 17 g by mouth 2 (two) times daily.     Family History     Problem Relation (Age of Onset)    Cancer Brother (50)    Depression Mother    Diabetes Mother, Father, Brother, Son, Sister, Maternal Uncle, " Paternal Aunt, Maternal Grandmother, Maternal Grandfather    Heart disease Mother, Brother    Hypertension Mother, Father, Brother    Kidney disease Mother, Father    Stroke Father, Brother    Vision loss Brother        Social History Main Topics    Smoking status: Never Smoker    Smokeless tobacco: Never Used    Alcohol use No    Drug use: No    Sexual activity: Yes     Partners: Female     Review of Systems   All other systems reviewed and are negative.    Objective:     Vital Signs (Most Recent):  Temp: 99 °F (37.2 °C) (05/29/18 0321)  Pulse: (!) 113 (05/29/18 0402)  Resp: 17 (05/29/18 0402)  BP: (!) 101/55 (05/29/18 0402)  SpO2: 100 % (05/29/18 0402) Vital Signs (24h Range):  Temp:  [99 °F (37.2 °C)-100.1 °F (37.8 °C)] 99 °F (37.2 °C)  Pulse:  [113-123] 113  Resp:  [16-18] 17  SpO2:  [94 %-100 %] 100 %  BP: ()/(55-59) 101/55     Patient Vitals for the past 72 hrs (Last 3 readings):   Weight   05/29/18 0152 79.9 kg (176 lb 2.4 oz)     Body mass index is 27.59 kg/m².    No intake or output data in the 24 hours ending 05/29/18 0456    Physical Exam  General: drowsy but arousable, oriented to person and place.gets confused somewhat at date initially but remembers later  Eyes:PERRL.   Neck:no JVD , tach insertion site looks clean with no discharge  Lungs: Decreased BS right lung   Cardiovascular: Heart: tachycardic with regular rhythm, S1, S2 normal, no murmur, click, rub or gallop.   Chest Wall: no tenderness.   Pulses-1+ b/l UE and DP LE  Extremities: no cyanosis or edema. Gangrenous toes (dry gangrene-from pressor use) right big toe , 2nd toe and left big toe  Abdomen/Rectal: Abdomen: soft, non-tender non-distented; bowel sounds normal  Neurologic: generalized weakness but no focal deficits.  Significant Labs:  CBC:    Recent Labs  Lab 05/29/18  0303   WBC 7.52   RBC 2.59*   HGB 7.8*   HCT 26.0*      *   MCH 30.1   MCHC 30.0*     BNP:    Recent Labs  Lab 05/29/18  0303   BNP 1,228*      CMP:    Recent Labs  Lab 05/29/18 0303      CALCIUM 9.0   ALBUMIN 2.5*   PROT 6.2      K 3.8   CO2 25      BUN 28*   CREATININE 4.2*   ALKPHOS 151*   ALT 12   AST 20   BILITOT 0.5      Coagulation:   No results for input(s): PT, INR, APTT in the last 168 hours.  LDH:  No results for input(s): LDH in the last 72 hours.  Microbiology:  Microbiology Results (last 7 days)     Procedure Component Value Units Date/Time    Blood culture #2 **CANNOT BE ORDERED STAT** [[id number]] Collected:  05/29/18 0319    Order Status:  Sent Specimen:  Blood from Peripheral, Hand, Left Updated:  05/29/18 0345    Blood culture #1 **CANNOT BE ORDERED STAT** [[id number]] Collected:  05/29/18 0303    Order Status:  Sent Specimen:  Blood from Peripheral, Wrist, Right Updated:  05/29/18 0314          I have reviewed all pertinent labs within the past 24 hours.    Diagnostic Results:  I have reviewed all pertinent imaging results/findings within the past 24 hours.     CXR-right pleural effusion and increased edema     EKG-Sinus tachycardia and inferior TWI +TWI in [id number] from prior EKG

## 2018-05-29 NOTE — ASSESSMENT & PLAN NOTE
CACHORRO on superimposed on CKD stage 4 secondary to prograft toxcity/ischemic ATN (see last admission consult 3/11-5/18/18-discharge to rehab) now dialysis dependent via [facility] (since 3/14/2018) who is admitted from [facility] for fever 100-101.6 since Friday night. Last HD yesterday 5/28 3 L removed.   -L IJ permacath no overt signs of tunnel or exit site infection.   -Blood cultures are pending. Antibiotics per primary/Infectious Disease  -Lytes and acid base stable. Hypotension. BLE extremity edema.   -Start SLED for clearance and volume management  ml as tolerated. Midodrine TID. Albumin PRN. Discussed with primary team.        Anemia of CKD  -No iron. Continue Epo MWF      BMD  Lab Results   Component Value Date    PTH 23.0 04/19/2018    CALCIUM 9.0 05/29/2018    CAION 0.98 (L) 03/17/2018    PHOS 3.1 05/18/2018   -Hold Renvela. Renal diet.

## 2018-05-29 NOTE — ED NOTES
The patient is lying in stretcher resting; respirations are spontaneous, nasal cannula in place with oxygen at 2L. Side rails up x 2, call light is within reach of patient or family, explanation of care provided to family and patient, alarms set and turned on for monitor, pulse ox, plan of care: family to bedside. Pt offers no complaints at this time.  Pt aware awaiting room to become available on admitting unit. Will continue to monitor

## 2018-05-29 NOTE — ED NOTES
Pt lying in stretcher asleep, awakens easily to voice. Pt informed ICU floor is full and still awaiting room on admitting unit. NAD. Pt remains on cardiac monitor and pulse ox and blood pressure cycling every thirty minutes. Side rails up x 2 bed locked and in low position will call light in reach. Will continue to monitor

## 2018-05-30 NOTE — PROGRESS NOTES
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  Patient denies any localizing signs or symptoms of infection.  Exam notable for new oxygen requirement, gangrenous toes, stage I left ischial wound. If patient with persistent fevers, may need to evaluate for possible fungal etiologies, sample pleural effusion.    Recommendations:  - cont vanc and cefepime for now  - will follow up pending cultures, RVP, CMV PCR  - fungal labs added today  - recommend pulm consult for bronchoscopy. Check cell count w dif, aspergillus, AFB stain and culture, bacterial cultures                Thank you for your consult. I will follow-up with patient. Please contact us if you have any additional questions.    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 Patient reports low grade fevers that have not lasted over an hour. He denies cough, dyspnea, sore throat, rhinorrhea, abdominal pain, n/v, diarrhea. He reports the ulcers/gangrenous toes have improved since discharge. He also reports a sacral ulcer that wound care has been following and denies drainage from the area.     Interval History: patient reports nonproductive cough developed overnight    Review of Systems   Constitutional: Positive for chills. Negative for fever.   Respiratory: Positive for cough. Negative for shortness of breath.    Gastrointestinal: Positive for abdominal pain and nausea. Negative for diarrhea.     Objective:     Vital Signs (Most Recent):  Temp: 100.3 °F (37.9 °C) (05/30/18 0800)  Pulse: (!) 125 (05/30/18 1000)  Resp: (!) 37 (05/30/18 1000)  BP: (!) 108/53 (05/30/18 1000)  SpO2: (!) 94 % (05/30/18 1000) Vital Signs (24h Range):  Temp:  [98.2 °F (36.8 °C)-100.3 °F (37.9 °C)] 100.3 °F (37.9 °C)  Pulse:  [] 125  Resp:  [0-44] 37  SpO2:  [89 %-100 %] 94 %  BP: ()/(49-87) 108/53     Weight: 73.7 kg (162 lb 7.7 oz)  Body mass index is 25.45 kg/m².    Estimated Creatinine Clearance: 40.1 mL/min (A) (based on SCr of 2.2 mg/dL (H)).    Physical Exam   Constitutional: He is oriented to person, place, and time. He appears well-developed and well-nourished. No distress.   HENT:   Head: Normocephalic and atraumatic.   Eyes: Conjunctivae are normal.   Neck: Normal range of motion. Neck supple.   Cardiovascular: Regular rhythm.   Tachycardia present.    Pulmonary/Chest: Effort normal. No respiratory distress. He has decreased breath sounds in the right middle field and the right lower field. He has rales.   Abdominal: Soft. Bowel sounds are normal. He exhibits no distension. There is no tenderness. There is no guarding.   Musculoskeletal: He exhibits edema.   Lymphadenopathy:     He has no cervical adenopathy.   Neurological: He is alert and oriented to person, place, and time.   Skin: Skin is warm and dry. Rash noted. He is not diaphoretic. There is erythema.   Hypopigmented macular rash on forehead  Dry gangrene of bilateral great toes. Mild erythema of left great toe and dried blood from nail separation from bed. Toes are TTP   Vitals reviewed.      Significant Labs: All pertinent labs within the past 24 hours have been reviewed.    Significant Imaging: I have reviewed all pertinent imaging results/findings within the past 24 hours.

## 2018-05-30 NOTE — CONSULTS
Attempted to see patient for wound care consult. Patient with visitors at the bedside and eating a meal at this time. Will not disturb to turn and assess at this time. Will attempt to see patient at another time. Nursing to continue care.

## 2018-05-30 NOTE — NURSING
Pt received from ED. Dr Neal notified of arrival.Lazarus in dialysis called to start CRRT. Left great toe and left 1st 3 toes black eschar.

## 2018-05-30 NOTE — ASSESSMENT & PLAN NOTE
- hb 8-9 at time of discharge.   - trending down after admission possibly from fq blood draws compounded by current/chronic infection.  -denies any bleeding  -continue to monitor  - consider iron infusion or oral replacement

## 2018-05-30 NOTE — PROGRESS NOTES
Spoke to patient at bedside about pulmonary intention to do Bronch tomorrow at 10am. Wife in the room but sleeping. Patient ok with informing her of this development. He currently is currently in no acute distress and fever has broken off since tylenol was given at 11am. ID ok with continuing Vanco and cefepime for now.

## 2018-05-30 NOTE — ASSESSMENT & PLAN NOTE
-S/p OHT 2014  -Continue prednisone and cellcept and tacrolimus  -Tacro level 4.5 yesterday  - increased dose to 4/4 from yesterday. Will continue to monitor and adjust as needed

## 2018-05-30 NOTE — ASSESSMENT & PLAN NOTE
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  Patient denies any localizing signs or symptoms of infection.  Exam notable for new oxygen requirement, gangrenous toes, stage I left ischial wound. If patient with persistent fevers, may need to evaluate for possible fungal etiologies, sample pleural effusion.    Recommendations:  - cont vanc and cefepime for now  - will follow up pending cultures, RVP, CMV PCR  - fungal labs added today  - recommend pulm consult for bronchoscopy. Check cell count w dif, aspergillus, AFB stain and culture, bacterial cultures

## 2018-05-30 NOTE — HPI
Patient is a 42 y.o. male with a diagnosis of T1DM, HTN, hypothyroidism, s/p heart transplant. Consulted for B/L foot gangrene. Pt seen at bedside sleeping with no family present at bedside. Records obtained from previous notes. Pt previously reports that he began to notice darkening to toes during hospital admission after patient on pressors. High risk patient with multiple gangrenous changes to toña digits. No other pedal complaints at this time.

## 2018-05-30 NOTE — SUBJECTIVE & OBJECTIVE
"Scheduled Meds:   aspirin  81 mg Oral Daily    ceFEPime (MAXIPIME) IVPB  1 g Intravenous Q12H    cetirizine  5 mg Oral Daily    escitalopram oxalate  20 mg Oral Daily    gabapentin  100 mg Oral TID    heparin (porcine)  5,000 Units Subcutaneous Q8H    insulin detemir U-100  8 Units Subcutaneous BID    levalbuterol  0.63 mg Nebulization Q4H WAKE    levothyroxine  137 mcg Oral Before breakfast    magnesium oxide  400 mg Oral Daily    midodrine  10 mg Oral TID WM    mycophenolate  250 mg Oral BID    pantoprazole  40 mg Oral Daily    polyethylene glycol  17 g Oral BID    pravastatin  40 mg Oral Daily    predniSONE  2.5 mg Oral Daily    QUEtiapine  50 mg Oral QHS    tacrolimus  4 mg Oral BID     Continuous Infusions:   sodium chloride 0.9% 200 mL/hr at 05/30/18 0600     PRN Meds:acetaminophen, ALPRAZolam, benzonatate, dextrose 50%, dextrose 50%, glucagon (human recombinant), glucose, glucose, insulin aspart U-100, ondansetron, oxyCODONE    Review of patient's allergies indicates:   Allergen Reactions    No known drug allergies         Past Medical History:   Diagnosis Date    Anxiety     Arthritis     Ascites     Thought to be 2/2 heart tpx; liver bx 3/31/17 w/o significant fibrosis    Cardiomyopathy     Depression     Controlled "for the most part" on Lexipro    Encounter for blood transfusion     during transplant    GERD (gastroesophageal reflux disease)     Hashimoto's disease     History of CHF (congestive heart failure)     Previously EF 20% (prior to heart transplant); last EF 60%, PAP 41 as of 12/12/17; throught to be 2/2 genetics & DM1    History of coronary artery disease     H/o Coronary artery disease; resolved since heart transplant 2014    History of myocardial infarction     h/o MI x3 prior to heart transplant    HLD (hyperlipidemia) 6/11/2015    Hx of psychiatric care     Hypoglycemia unawareness in type 1 diabetes mellitus     Hypothyroid     Initially hyperthyroid 2/2 " Hoshimoto's thyroiditis; now on levothyroxine 150 mcg qd    Pleural effusion due to another disorder     Thought to be 2/2 heart tpx; s/p PleuRx catheter placement and removal after 1-2 months    Psychiatric problem     Pulmonary hypertension assoc with unclear multi-factorial mechanisms 12/12/2017    PAP 41 (EF 60%) on ECHO 12/12/17    Syncope 6/30/2015    Type I diabetes mellitus, well controlled     Well controlled; Dx'd @5y/o; on N insulin 20U BID & Humalog 10U w/meals +SSI; Glu 89 11/9/17; A1c 7.2% 4/5/17    Unspecified essential hypertension 05/29/2014    Well controlled; Last /57 on 11/9/17     Past Surgical History:   Procedure Laterality Date    CARDIAC CATHETERIZATION      CARDIAC SURGERY      CHOLECYSTECTOMY      COLONOSCOPY N/A 3/13/2018    Procedure: COLONOSCOPY;  Surgeon: Tim Spencer MD;  Location: [facility] ([address] [place] [address]);  Service: Endoscopy;  Laterality: N/A;    CORONARY ANGIOPLASTY      FOOT SPLIT TRANSFER OF THE POSTERIOR TIBIALIS TENDON PROCEDURE      HEART TRANSPLANT  2014    KNEE SURGERY      PleuRx catheter placement  01/11/2018    Plan for removal after 1-2 months by Dr. James in cardiothoracic surgery    s/p oht  November 2014    stent placemnet         Family History     Problem Relation (Age of Onset)    Cancer Brother (50)    Depression Mother    Diabetes Mother, Father, Brother, Son, Sister, Maternal Uncle, Paternal Aunt, Maternal Grandmother, Maternal Grandfather    Heart disease Mother, Brother    Hypertension Mother, Father, Brother    Kidney disease Mother, Father    Stroke Father, Brother    Vision loss Brother        Social History Main Topics    Smoking status: Never Smoker    Smokeless tobacco: Never Used    Alcohol use No    Drug use: No    Sexual activity: Yes     Partners: Female     Review of Systems   Skin: Positive for wound.     Objective:     Vital Signs (Most Recent):  Temp: (!) 102.4 °F (39.1 °C) (05/30/18 1132)  Pulse: (!) 121 (05/30/18  1126)  Resp: (!) 38 (05/30/18 1126)  BP: (!) 91/56 (05/30/18 1115)  SpO2: (!) 94 % (05/30/18 1126) Vital Signs (24h Range):  Temp:  [98.2 °F (36.8 °C)-102.4 °F (39.1 °C)] 102.4 °F (39.1 °C)  Pulse:  [] 121  Resp:  [0-47] 38  SpO2:  [89 %-100 %] 94 %  BP: ()/(49-87) 91/56     Weight: 73.7 kg (162 lb 7.7 oz)  Body mass index is 25.45 kg/m².    Foot Exam    General  Affect: appropriate       Right Foot/Ankle     Inspection and Palpation  Ecchymosis: first toe, second toe and third toe  Skin Exam: skin changes, abnormal color, ulcer and erythema; no drainage, no maceration and no cellulitis     Neurovascular  Dorsalis pedis: 1+  Posterior tibial: absent  Saphenous nerve sensation: diminished  Tibial nerve sensation: diminished  Superficial peroneal nerve sensation: diminished  Deep peroneal nerve sensation: diminished  Sural nerve sensation: diminished      Left Foot/Ankle      Inspection and Palpation  Ecchymosis: first toe  Skin Exam: skin changes, abnormal color, ulcer and erythema; no drainage, no maceration and no cellulitis     Neurovascular  Dorsalis pedis: 1+  Posterior tibial: absent  Saphenous nerve sensation: diminished  Tibial nerve sensation: diminished  Superficial peroneal nerve sensation: diminished  Deep peroneal nerve sensation: diminished  Sural nerve sensation: diminished            Laboratory:  A1C:   Recent Labs  Lab 02/11/18  1113 04/05/18  1713 05/29/18  0303   HGBA1C 6.9* 5.1 5.9*     BMP:   Recent Labs  Lab 05/30/18  0400   *  197*     139   K 4.0  4.0     104   CO2 24  24   BUN 14  14   CREATININE 2.2*  2.2*   CALCIUM 8.3*  8.3*   MG 1.8  1.8     CBC:   Recent Labs  Lab 05/30/18  0400   WBC 5.43   RBC 2.39*   HGB 7.3*   HCT 23.8*      *   MCH 30.5   MCHC 30.7*     CMP:   Recent Labs  Lab 05/30/18  0400   *  197*   CALCIUM 8.3*  8.3*   ALBUMIN 2.3*  2.3*   PROT 5.9*     139   K 4.0  4.0   CO2 24  24     104   BUN 14   14   CREATININE 2.2*  2.2*   ALKPHOS 147*   ALT 13   AST 21   BILITOT 0.5     Coagulation: No results for input(s): PT, INR, APTT in the last 168 hours.  CRP: No results for input(s): CRP in the last 168 hours.  ESR: No results for input(s): SEDRATE in the last 168 hours.  Prealbumin: No results for input(s): PREALBUMIN in the last 48 hours.  Wound Cultures:   Recent Labs  Lab 01/11/18  0829 02/14/18  1400 03/28/18  1607 05/10/18  1411 05/15/18  1518   LABAERO No growth No growth No growth No growth No growth     Microbiology Results (last 7 days)     Procedure Component Value Units Date/Time    Cryptococcal antigen [[id number]]     Order Status:  No result Specimen:  Blood from Blood     Culture, Respiratory with Gram Stain [[id number]]     Order Status:  No result Specimen:  Respiratory from Sputum     Blood culture #2 **CANNOT BE ORDERED STAT** [[id number]] Collected:  05/29/18 0319    Order Status:  Completed Specimen:  Blood from Peripheral, Hand, Left Updated:  05/30/18 0612     Blood Culture, Routine No Growth to date     Blood Culture, Routine No Growth to date    Blood culture #1 **CANNOT BE ORDERED STAT** [[id number]] Collected:  05/29/18 0303    Order Status:  Completed Specimen:  Blood from Peripheral, Wrist, Right Updated:  05/30/18 0612     Blood Culture, Routine No Growth to date     Blood Culture, Routine No Growth to date    Respiratory Viral Panel by PCR [facility]; Nasal Swab [[id number]] Collected:  05/29/18 2333    Order Status:  Sent Specimen:  Respiratory Updated:  05/29/18 2349    Respiratory Viral Panel by PCR [facility]; Sputum [[id number]]     Order Status:  Canceled Specimen:  Respiratory         Specimen (12h ago through future)    None          Diagnostic Results:    X-Ray: I have reviewed all pertinent results/findings within the past 24 hours.  Ordered    Clinical Findings:  Dry gangrenous changes noted to digits of toña foot, well demarchated. No periwound SOI noted, stable.

## 2018-05-30 NOTE — SUBJECTIVE & OBJECTIVE
Interval History:     Febrile (tmax 102.2). All cultures including RSV still in progress but negative. Net negative 419 with 2000mL UF last night. Report nausea wo emesis this morning. ID contacted and advised to continue vanco and cefepime for now. Pulmonary requested to review CT scan and possibly do bronch.    Continuous Infusions:   sodium chloride 0.9% 200 mL/hr at 05/30/18 0600     Scheduled Meds:   aspirin  81 mg Oral Daily    ceFEPime (MAXIPIME) IVPB  1 g Intravenous Q12H    cetirizine  5 mg Oral Daily    escitalopram oxalate  20 mg Oral Daily    gabapentin  100 mg Oral TID    heparin (porcine)  5,000 Units Subcutaneous Q8H    insulin detemir U-100  8 Units Subcutaneous BID    levalbuterol  0.63 mg Nebulization Q4H WAKE    levothyroxine  137 mcg Oral Before breakfast    magnesium oxide  400 mg Oral Daily    midodrine  10 mg Oral TID WM    mycophenolate  250 mg Oral BID    pantoprazole  40 mg Oral Daily    polyethylene glycol  17 g Oral BID    pravastatin  40 mg Oral Daily    predniSONE  2.5 mg Oral Daily    QUEtiapine  50 mg Oral QHS    tacrolimus  4 mg Oral BID     PRN Meds:acetaminophen, ALPRAZolam, benzonatate, dextrose 50%, dextrose 50%, glucagon (human recombinant), glucose, glucose, insulin aspart U-100, ondansetron, oxyCODONE    Review of patient's allergies indicates:   Allergen Reactions    No known drug allergies      Objective:     Vital Signs (Most Recent):  Temp: (!) 101.7 °F (38.7 °C) (05/30/18 1231)  Pulse: (!) 119 (05/30/18 1300)  Resp: 20 (05/30/18 1300)  BP: (!) 82/51 (05/30/18 1300)  SpO2: 96 % (05/30/18 1300) Vital Signs (24h Range):  Temp:  [98.2 °F (36.8 °C)-102.4 °F (39.1 °C)] 101.7 °F (38.7 °C)  Pulse:  [] 119  Resp:  [0-47] 20  SpO2:  [89 %-100 %] 96 %  BP: ()/(49-87) 82/51     Patient Vitals for the past 72 hrs (Last 3 readings):   Weight   05/29/18 2141 73.7 kg (162 lb 7.7 oz)   05/29/18 0152 79.9 kg (176 lb 2.4 oz)     Body mass index is 25.45  kg/m².      Intake/Output Summary (Last 24 hours) at 05/30/18 1427  Last data filed at 05/30/18 1400   Gross per 24 hour   Intake             2285 ml   Output             2549 ml   Net             -264 ml       Hemodynamic Parameters:       Telemetry: sinus tachycardia noted    Physical Exam  Constitutional: He is oriented to person, place, and time.   Mildly uncomfortable on bed this morning.  Eyes: Pupils are equal, round, and reactive to light.   Neck: No JVD present.   Cardiovascular: Normal rate and regular rhythm.    No murmur heard.  Tachycardia present   Pulmonary/Chest: Effort normal and breath sounds normal.   Abdominal: Soft. Bowel sounds are normal.   No fluid wave   Musculoskeletal: He exhibits no edema or tenderness.   Neurological: He is alert and oriented to person, place, and time.   Skin: Skin is warm and dry. Dry gangrene to toes bilaterally. Left great toe gangrene improved and less tender. No drainage  Psychiatric: mood and affect not evaluated but grossly normal  Nursing note reviewed.       Significant Labs:  CBC:    Recent Labs  Lab 05/29/18  0303 05/30/18  0400   WBC 7.52 5.43   RBC 2.59* 2.39*   HGB 7.8* 7.3*   HCT 26.0* 23.8*    185   * 100*   MCH 30.1 30.5   MCHC 30.0* 30.7*     BNP:    Recent Labs  Lab 05/29/18  0303   BNP 1,228*     CMP:    Recent Labs  Lab 05/29/18  0303 05/30/18  0400    197*  197*   CALCIUM 9.0 8.3*  8.3*   ALBUMIN 2.5* 2.3*  2.3*   PROT 6.2 5.9*    139  139   K 3.8 4.0  4.0   CO2 25 24  24    104  104   BUN 28* 14  14   CREATININE 4.2* 2.2*  2.2*   ALKPHOS 151* 147*   ALT 12 13   AST 20 21   BILITOT 0.5 0.5      Coagulation:   No results for input(s): PT, INR, APTT in the last 168 hours.  LDH:  No results for input(s): LDH in the last 72 hours.  Microbiology:  Microbiology Results (last 7 days)     Procedure Component Value Units Date/Time    Cryptococcal antigen [[id number]] Collected:  05/30/18 1222    Order Status:  Sent  Specimen:  Blood from Blood Updated:  05/30/18 1242    Culture, Respiratory with Gram Stain [[id number]]     Order Status:  No result Specimen:  Respiratory from Sputum     Blood culture #2 **CANNOT BE ORDERED STAT** [[id number]] Collected:  05/29/18 0319    Order Status:  Completed Specimen:  Blood from Peripheral, Hand, Left Updated:  05/30/18 0612     Blood Culture, Routine No Growth to date     Blood Culture, Routine No Growth to date    Blood culture #1 **CANNOT BE ORDERED STAT** [[id number]] Collected:  05/29/18 0303    Order Status:  Completed Specimen:  Blood from Peripheral, Wrist, Right Updated:  05/30/18 0612     Blood Culture, Routine No Growth to date     Blood Culture, Routine No Growth to date    Respiratory Viral Panel by PCR [facility]; Nasal Swab [[id number]] Collected:  05/29/18 2333    Order Status:  Sent Specimen:  Respiratory Updated:  05/29/18 2349    Respiratory Viral Panel by PCR [facility]; Sputum [[id number]]     Order Status:  Canceled Specimen:  Respiratory           I have reviewed all pertinent labs within the past 24 hours.    Estimated Creatinine Clearance: 40.1 mL/min (A) (based on SCr of 2.2 mg/dL (H)).    Diagnostic Results:  CT: No results found in the last 24 hours.    Patchy subsegmental pulmonary opacities throughout both lungs suggest infection versus noninfectious inflammation, less likely edema.  Clinical correlation is advised.    Moderate volume right-sided pleural effusion with trace left-sided pleural fluid.    Persistent round right lower lobe opacity, similar to prior examination.  This is nonspecific, although could represent chronic rounded atelectasis.  Continued imaging follow-up is advised to exclude underlying mass.    New moderate T12 compression deformity.    Stable postsurgical change of heart transplantation.    Small volume of abdominal ascites.    This report was flagged in Epic as abnormal.    Electronically signed by resident: Delvis Christianson  Date: 05/29/2018  Time:  05:13

## 2018-05-30 NOTE — ASSESSMENT & PLAN NOTE
- Dry gangrene developed previous admission with pressor use  - Will continue with betadine rx as current.   - Podiatry on board  - Foot XR and dopplers pending

## 2018-05-30 NOTE — SUBJECTIVE & OBJECTIVE
Interval History:   SLED overnight, net negative 500 ml/24h, adequate metabolic clearance.  CT imaging being reported as infectious/non-infectious etiology rather than edema.  SLED clotted this morning.  Remains with low-grade fever and developed non-productive cough overnight and increased HR this morning.      Review of patient's allergies indicates:   Allergen Reactions    No known drug allergies      Current Facility-Administered Medications   Medication Frequency    0.9%  NaCl infusion (CRRT USE ONLY) Continuous    ALPRAZolam tablet 0.5 mg Q6H PRN    aspirin EC tablet 81 mg Daily    benzonatate capsule 100 mg TID PRN    ceFEPIme injection 1 g Q12H    cetirizine tablet 5 mg Daily    dextrose 50% injection 12.5 g PRN    dextrose 50% injection 25 g PRN    escitalopram oxalate tablet 20 mg Daily    gabapentin capsule 100 mg TID    glucagon (human recombinant) injection 1 mg PRN    glucose chewable tablet 16 g PRN    glucose chewable tablet 24 g PRN    heparin (porcine) injection 5,000 Units Q8H    insulin aspart U-100 pen 1-10 Units QID (AC + HS) PRN    insulin detemir U-100 pen 8 Units BID    levalbuterol nebulizer solution 0.63 mg Q4H WAKE    levothyroxine tablet 137 mcg Before breakfast    magnesium oxide tablet 400 mg Daily    midodrine tablet 10 mg TID WM    mycophenolate capsule 250 mg BID    ondansetron disintegrating tablet 8 mg Q8H PRN    oxyCODONE immediate release tablet 5 mg Q6H PRN    pantoprazole EC tablet 40 mg Daily    polyethylene glycol packet 17 g BID    pravastatin tablet 40 mg Daily    predniSONE tablet 2.5 mg Daily    QUEtiapine tablet 50 mg QHS    tacrolimus capsule 4 mg BID       Objective:     Vital Signs (Most Recent):  Temp: 100.3 °F (37.9 °C) (05/30/18 0800)  Pulse: (!) 125 (05/30/18 1000)  Resp: (!) 37 (05/30/18 1000)  BP: (!) 108/53 (05/30/18 1000)  SpO2: (!) 94 % (05/30/18 1000)  O2 Device (Oxygen Therapy): nasal cannula (05/30/18 1000) Vital Signs (24h  Range):  Temp:  [98.2 °F (36.8 °C)-100.3 °F (37.9 °C)] 100.3 °F (37.9 °C)  Pulse:  [] 125  Resp:  [0-44] 37  SpO2:  [89 %-100 %] 94 %  BP: ()/(49-87) 108/53     Weight: 73.7 kg (162 lb 7.7 oz) (05/29/18 2141)  Body mass index is 25.45 kg/m².  Body surface area is 1.87 meters squared.    I/O last 3 completed shifts:  In: 2130 [P.O.:480; I.V.:1650]  Out: 2549 [Other:2549]    Physical Exam   Constitutional: He is oriented to person, place, and time. He appears well-developed. He appears ill. No distress. Nasal cannula in place.   Ill appearing   HENT:   Head: Normocephalic and atraumatic.   Right Ear: External ear normal.   Left Ear: External ear normal.   Eyes: Conjunctivae and EOM are normal. Right eye exhibits no discharge. Left eye exhibits no discharge.   Neck: Normal range of motion. Neck supple.   Cardiovascular: Regular rhythm.  Tachycardia present.    Pulmonary/Chest: Effort normal. No respiratory distress. He has no wheezes. He has rales.   Abdominal: Soft. Bowel sounds are normal. He exhibits no distension. There is no tenderness.   Musculoskeletal: He exhibits edema (trace LE).   Neurological: He is alert and oriented to person, place, and time.   Skin: Skin is warm and dry. He is not diaphoretic.   -L IJ Permacath   -Right necrotic toes 1-3-similar to previous    Psychiatric: He has a normal mood and affect.       Significant Labs:  CBC:   Recent Labs  Lab 05/30/18  0400   WBC 5.43   RBC 2.39*   HGB 7.3*   HCT 23.8*      *   MCH 30.5   MCHC 30.7*     CMP:   Recent Labs  Lab 05/30/18  0400   *  197*   CALCIUM 8.3*  8.3*   ALBUMIN 2.3*  2.3*   PROT 5.9*     139   K 4.0  4.0   CO2 24  24     104   BUN 14  14   CREATININE 2.2*  2.2*   ALKPHOS 147*   ALT 13   AST 21   BILITOT 0.5

## 2018-05-30 NOTE — ASSESSMENT & PLAN NOTE
Persistent right sided pleural effusion  Ct chest show moderate amount.   Will monitor during stay  Consider thoracentesis by pulmonary.

## 2018-05-30 NOTE — ASSESSMENT & PLAN NOTE
- on HD M, W, F  - BNP elevated > 1200 on admission  - s/p 2L UF yesterday night  - Will hold UF for now. Will consider regular HD as scheduled depending  - denies congestion

## 2018-05-30 NOTE — SUBJECTIVE & OBJECTIVE
Interval History: patient reports nonproductive cough developed overnight    Review of Systems   Constitutional: Positive for chills. Negative for fever.   Respiratory: Positive for cough. Negative for shortness of breath.    Gastrointestinal: Positive for abdominal pain and nausea. Negative for diarrhea.     Objective:     Vital Signs (Most Recent):  Temp: 100.3 °F (37.9 °C) (05/30/18 0800)  Pulse: (!) 125 (05/30/18 1000)  Resp: (!) 37 (05/30/18 1000)  BP: (!) 108/53 (05/30/18 1000)  SpO2: (!) 94 % (05/30/18 1000) Vital Signs (24h Range):  Temp:  [98.2 °F (36.8 °C)-100.3 °F (37.9 °C)] 100.3 °F (37.9 °C)  Pulse:  [] 125  Resp:  [0-44] 37  SpO2:  [89 %-100 %] 94 %  BP: ()/(49-87) 108/53     Weight: 73.7 kg (162 lb 7.7 oz)  Body mass index is 25.45 kg/m².    Estimated Creatinine Clearance: 40.1 mL/min (A) (based on SCr of 2.2 mg/dL (H)).    Physical Exam   Constitutional: He is oriented to person, place, and time. He appears well-developed and well-nourished. No distress.   HENT:   Head: Normocephalic and atraumatic.   Eyes: Conjunctivae are normal.   Neck: Normal range of motion. Neck supple.   Cardiovascular: Regular rhythm.  Tachycardia present.    Pulmonary/Chest: Effort normal. No respiratory distress. He has decreased breath sounds in the right middle field and the right lower field. He has rales.   Abdominal: Soft. Bowel sounds are normal. He exhibits no distension. There is no tenderness. There is no guarding.   Musculoskeletal: He exhibits edema.   Lymphadenopathy:     He has no cervical adenopathy.   Neurological: He is alert and oriented to person, place, and time.   Skin: Skin is warm and dry. Rash noted. He is not diaphoretic. There is erythema.   Hypopigmented macular rash on forehead  Dry gangrene of bilateral great toes. Mild erythema of left great toe and dried blood from nail separation from bed. Toes are TTP   Vitals reviewed.      Significant Labs: All pertinent labs within the past 24  hours have been reviewed.    Significant Imaging: I have reviewed all pertinent imaging results/findings within the past 24 hours.

## 2018-05-30 NOTE — CONSULTS
"Consult Note  Pulmonary Medicine    SUBJECTIVE     Reason for consult: pleural effusion    History of Present Illness  PCCM is consulted for pleural effusion in this 42-yo male with PMHx including DM, ESRD on HD, OHTx (on prednisone, tacrolimus, mycophenolate), and recent lengthy admission for RSV pneumonia with related respiratory failure, CACHORRO requiring dialysis, PEG/trach placement, and pressor-related gangrene of the toes, who was admitted from SNF after several days of intermittent fever. Pt is known to this service, as we removed his trach tube three weeks ago. On readmit, CT revealed a right pleural effusion and scattered patchy consolidations in both lungs. Pt says that he was tolerating PT without desaturations through the weekend. He denies respiratory symptoms including productive cough, hemoptysis, sinus congestion, trach fluid. His stoma has mostly closed up and shows no signs of infection.     Past Medical History:   Diagnosis Date    Anxiety     Arthritis     Ascites     Thought to be 2/2 heart tpx; liver bx 3/31/17 w/o significant fibrosis    Cardiomyopathy     Depression     Controlled "for the most part" on Lexipro    Encounter for blood transfusion     during transplant    GERD (gastroesophageal reflux disease)     Hashimoto's disease     History of CHF (congestive heart failure)     Previously EF 20% (prior to heart transplant); last EF 60%, PAP 41 as of 12/12/17; throught to be 2/2 genetics & DM1    History of coronary artery disease     H/o Coronary artery disease; resolved since heart transplant 2014    History of myocardial infarction     h/o MI x3 prior to heart transplant    HLD (hyperlipidemia) 6/11/2015    Hx of psychiatric care     Hypoglycemia unawareness in type 1 diabetes mellitus     Hypothyroid     Initially hyperthyroid 2/2 Hoshimoto's thyroiditis; now on levothyroxine 150 mcg qd    Pleural effusion due to another disorder     Thought to be 2/2 heart tpx; s/p PleuRx " catheter placement and removal after 1-2 months    Psychiatric problem     Pulmonary hypertension assoc with unclear multi-factorial mechanisms 12/12/2017    PAP 41 (EF 60%) on ECHO 12/12/17    Syncope 6/30/2015    Type I diabetes mellitus, well controlled     Well controlled; Dx'd @5y/o; on N insulin 20U BID & Humalog 10U w/meals +SSI; Glu 89 11/9/17; A1c 7.2% 4/5/17    Unspecified essential hypertension 05/29/2014    Well controlled; Last /57 on 11/9/17       Past Surgical History:   Procedure Laterality Date    CARDIAC CATHETERIZATION      CARDIAC SURGERY      CHOLECYSTECTOMY      COLONOSCOPY N/A 3/13/2018    Procedure: COLONOSCOPY;  Surgeon: Tim Spencer MD;  Location: [facility] ([address] [place] [address]);  Service: Endoscopy;  Laterality: N/A;    CORONARY ANGIOPLASTY      FOOT SPLIT TRANSFER OF THE POSTERIOR TIBIALIS TENDON PROCEDURE      HEART TRANSPLANT  2014    KNEE SURGERY      PleuRx catheter placement  01/11/2018    Plan for removal after 1-2 months by Dr. James in cardiothoracic surgery    s/p oht  November 2014    stent placemnet         Family History   Problem Relation Age of Onset    Heart disease Mother     Kidney disease Mother     Diabetes Mother     Hypertension Mother     Depression Mother     Kidney disease Father     Diabetes Father     Hypertension Father     Stroke Father     Heart disease Brother     Stroke Brother     Diabetes Brother     Cancer Brother 50        pancreatic    Vision loss Brother     Hypertension Brother     Diabetes Son     Diabetes Sister     Diabetes Maternal Uncle     Diabetes Paternal Aunt     Diabetes Maternal Grandmother     Diabetes Maternal Grandfather        Social History     Social History    Marital status:      Spouse name: N/A    Number of children: N/A    Years of education: N/A     Social History Main Topics    Smoking status: Never Smoker    Smokeless tobacco: Never Used    Alcohol use No    Drug use: No     Sexual activity: Yes     Partners: Female     Other Topics Concern    None     Social History Narrative            Breakfast: Skips 80%; cereal; Diet Sprite    Lunch: Turkey or chicken sandwich on white bread; Diet Sprite    Dinner: Grilled burgers, small amount chips; Diet Sprite    Snacks: Ronny crackers before bed on most nights    Eats out: 2x/wk    Water: 0    PA: Walks 15 minutes (strenuous) daily    Goal weight 170-175 lbs by 1/2018       Current Facility-Administered Medications   Medication Dose Route Frequency Provider Last Rate Last Dose    0.9%  NaCl infusion (CRRT USE ONLY)   Intravenous Continuous Amanda Cuellar  mL/hr at 05/30/18 0600      acetaminophen tablet 650 mg  650 mg Oral Q6H PRN Josiah Walsh MD   650 mg at 05/30/18 1132    ALPRAZolam tablet 0.5 mg  0.5 mg Oral Q6H PRN Silvio Neal MD   0.5 mg at 05/30/18 0557    aspirin EC tablet 81 mg  81 mg Oral Daily Silvio Neal MD   81 mg at 05/30/18 0936    benzonatate capsule 100 mg  100 mg Oral TID PRN Silvio Neal MD        ceFEPIme injection 1 g  1 g Intravenous Q12H Silvio Neal MD   1 g at 05/30/18 0610    cetirizine tablet 5 mg  5 mg Oral Daily Silvio Neal MD   5 mg at 05/30/18 0942    dextrose 50% injection 12.5 g  12.5 g Intravenous PRN Silvio Neal MD        dextrose 50% injection 25 g  25 g Intravenous PRN Silvio Neal MD        escitalopram oxalate tablet 20 mg  20 mg Oral Daily Silvio Neal MD   20 mg at 05/30/18 0936    gabapentin capsule 100 mg  100 mg Oral TID Silvio Neal MD   100 mg at 05/30/18 1523    glucagon (human recombinant) injection 1 mg  1 mg Intramuscular PRN Silvio Neal MD        glucose chewable tablet 16 g  16 g Oral PRN Silvio Neal MD        glucose chewable tablet 24 g  24 g Oral PRN Silvio Neal MD        heparin (porcine) injection 5,000 Units  5,000 Units Subcutaneous Q8H Silvio Neal MD   5,000 Units at 05/30/18 1339    insulin aspart U-100 pen  1-10 Units  1-10 Units Subcutaneous QID (AC + HS) PRN Silvio Neal MD   3 Units at 05/29/18 2210    insulin detemir U-100 pen 8 Units  8 Units Subcutaneous BID Silvio Neal MD   8 Units at 05/30/18 0940    levalbuterol nebulizer solution 0.63 mg  0.63 mg Nebulization Q4H WAKE Silvio Neal MD   0.63 mg at 05/30/18 1536    levothyroxine tablet 137 mcg  137 mcg Oral Before breakfast Silvio Neal MD   137 mcg at 05/30/18 0610    magnesium oxide tablet 400 mg  400 mg Oral Daily Silvio Neal MD   400 mg at 05/30/18 0936    midodrine tablet 10 mg  10 mg Oral TID WM Silvio Neal MD   10 mg at 05/30/18 1339    mycophenolate capsule 250 mg  250 mg Oral BID Silvio Neal MD   250 mg at 05/30/18 0936    ondansetron disintegrating tablet 8 mg  8 mg Oral Q8H PRN Silvio Neal MD        oxyCODONE immediate release tablet 5 mg  5 mg Oral Q6H PRN Silvio Neal MD        pantoprazole EC tablet 40 mg  40 mg Oral Daily Silvio Neal MD   40 mg at 05/30/18 0936    polyethylene glycol packet 17 g  17 g Oral BID Silvio Neal MD   17 g at 05/30/18 0935    pravastatin tablet 40 mg  40 mg Oral Daily Silvio Neal MD   40 mg at 05/30/18 0936    predniSONE tablet 2.5 mg  2.5 mg Oral Daily Silvio Neal MD   2.5 mg at 05/30/18 0936    QUEtiapine tablet 50 mg  50 mg Oral QHS Silvio Neal MD   50 mg at 05/29/18 2117    tacrolimus capsule 4 mg  4 mg Oral BID Kanika Ferreira MD   4 mg at 05/30/18 0936    vancomycin 1 gram/250 mL in sodium chloride 0.9% IVPB 1 g  1,000 mg Intravenous Once Josiah Walsh  mL/hr at 05/30/18 1523 1 g at 05/30/18 1523       Review of patient's allergies indicates:   Allergen Reactions    No known drug allergies          Review of Systems  As above. Additionally:   Constitutional: Positive for fever. Negative for chills.   HENT: Negative for congestion, ear pain, rhinorrhea and sore throat.    Respiratory: Negative for cough, shortness of breath and wheezing.     Gastrointestinal: Negative for abdominal pain, diarrhea, nausea and vomiting.   Musculoskeletal: Negative for back pain.   Skin: Positive for wound. Negative for rash.   Neurological: Negative for headaches.   Hematological: Negative for adenopathy.         OBJECTIVE     Vitals:    05/30/18 1536   BP:    Pulse: 109   Resp: (!) 22   Temp:        Physical Exam  Constitutional: He is oriented to person, place, and time. He appears well-developed and well-nourished. No distress.   HENT: Head: Normocephalic and atraumatic. Eyes: Conjunctivae are normal. Neck: Normal range of motion. Neck supple.   Cardiovascular: Regular rhythm. Tachycardia present. S1/S2 normal.    Pulmonary/Chest: Effort normal. No respiratory distress. He has decreased breath sounds in the right middle field and the right lower field. He has rales.   Abdominal: Soft. Bowel sounds are normal. He exhibits no distension. There is no tenderness. There is no guarding.   Musculoskeletal: He exhibits edema.   Neurological: He is alert and oriented to person, place, and time.   Skin: Skin is warm and dry.  He is not diaphoretic.   Hypopigmented macular rash on forehead  Dry gangrene of bilateral toes.      Laboratory  No results for input(s): INR in the last 168 hours.      Recent Labs  Lab 05/29/18  0303 05/30/18  0400   WBC 7.52 5.43   HGB 7.8* 7.3*   HCT 26.0* 23.8*    185         Recent Labs  Lab 05/29/18  0303 05/30/18  0400    139  139   K 3.8 4.0  4.0    104  104   CO2 25 24  24   BUN 28* 14  14   CREATININE 4.2* 2.2*  2.2*   CALCIUM 9.0 8.3*  8.3*   PROT 6.2 5.9*   BILITOT 0.5 0.5   ALKPHOS 151* 147*   ALT 12 13   AST 20 21     No results for input(s): PH, PCO2, PO2, HCO3, POCSATURATED, BE in the last 24 hours.      Diagnostic Results:  Reviewed        Assessment / Recommendations     Sepsis      - multiple potential sources in setting of immunosuppression: lung consolidation, pleural effusion, retained urine, gangrenous  toes, ascites, other  - on vanc and cefepime  - consider diagnostic paracentesis as Pt says he is pretty distended  - ID input appreciated          Pleural effusion     - chronic; Pt had PleurX catheter for about a month late last year  - given the chronicity, this is less likely to be the nidus of infection  - additionally, intervening would carry high risk and likely offer less benefit          Consolidation on CT     - infectious vs non-infectious  - given his immunosuppression, it would be reasonable to offer bronchoscopy with BAL to evaluate for infection (e.g. OI)  - we can offer the procedure tomorrow around 10 am  - please make NPO and hold any anticoagulation        Plan discussed with Dr. Bowie, staff. Thank you for this consult.     Jay García MD   Fellow, Pulmonary & Critical Care Medicine  spectralink [phone]

## 2018-05-30 NOTE — PROGRESS NOTES
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 with recurrent pleural effusions/ascites requiring monthly thoracenteses/paracenteses (last paracentesis 5/15/18 3.5 L), VATS 1/11/18 with Pleur-X catheter (now removed), and recent admission for diarrhea and RSV complicated with respiratory failure requiring intubation/trach/PEG (both Trach/PEG removed 5/10) and CACHORRO on superimposed on CKD stage 4 secondary to prograft toxcity?/ischemic ATN (see last admission consult 3/11-5/18/18-discharge to rehab) now dialysis dependent via [facility] (since 3/14/2018) who is admitted from [facility] for fever 100-101.6 since Friday night. Denies PND, rhinorrhea, cough or shortness of breath. Wife reports doing well at Rehab up -transfers and parallel bars.  Eating well since appetite stimulator started at rehab. Had HD 4 days last week MWTF due to volume excess. Last HD yesterday 3 L removed. Denies L IJ site tenderness.     CT chest wo contrast showed patchy subsegmental opacities through out lungs, mod volume right sided pleural effusion with trace left side pleural fluid and persistent round RLL opacity, new mod t12 compression deformity, and small volume abdominal ascites. Lactate 0.9. Blood cultures collected in ED. Vancomycin started in ED. SBP , Tacy 111's,  % on 2 L NC.     Interval History:   SLED overnight, net negative 500 ml/24h, adequate metabolic clearance.  CT imaging being reported as infectious/non-infectious etiology rather than edema.  SLED clotted this  morning.  Remains with low-grade fever and developed non-productive cough overnight and increased HR this morning.      Review of patient's allergies indicates:   Allergen Reactions    No known drug allergies      Current Facility-Administered Medications   Medication Frequency    0.9%  NaCl infusion (CRRT USE ONLY) Continuous    ALPRAZolam tablet 0.5 mg Q6H PRN    aspirin EC tablet 81 mg Daily    benzonatate capsule 100 mg TID PRN    ceFEPIme injection 1 g Q12H    cetirizine tablet 5 mg Daily    dextrose 50% injection 12.5 g PRN    dextrose 50% injection 25 g PRN    escitalopram oxalate tablet 20 mg Daily    gabapentin capsule 100 mg TID    glucagon (human recombinant) injection 1 mg PRN    glucose chewable tablet 16 g PRN    glucose chewable tablet 24 g PRN    heparin (porcine) injection 5,000 Units Q8H    insulin aspart U-100 pen 1-10 Units QID (AC + HS) PRN    insulin detemir U-100 pen 8 Units BID    levalbuterol nebulizer solution 0.63 mg Q4H WAKE    levothyroxine tablet 137 mcg Before breakfast    magnesium oxide tablet 400 mg Daily    midodrine tablet 10 mg TID WM    mycophenolate capsule 250 mg BID    ondansetron disintegrating tablet 8 mg Q8H PRN    oxyCODONE immediate release tablet 5 mg Q6H PRN    pantoprazole EC tablet 40 mg Daily    polyethylene glycol packet 17 g BID    pravastatin tablet 40 mg Daily    predniSONE tablet 2.5 mg Daily    QUEtiapine tablet 50 mg QHS    tacrolimus capsule 4 mg BID       Objective:     Vital Signs (Most Recent):  Temp: 100.3 °F (37.9 °C) (05/30/18 0800)  Pulse: (!) 125 (05/30/18 1000)  Resp: (!) 37 (05/30/18 1000)  BP: (!) 108/53 (05/30/18 1000)  SpO2: (!) 94 % (05/30/18 1000)  O2 Device (Oxygen Therapy): nasal cannula (05/30/18 1000) Vital Signs (24h Range):  Temp:  [98.2 °F (36.8 °C)-100.3 °F (37.9 °C)] 100.3 °F (37.9 °C)  Pulse:  [] 125  Resp:  [0-44] 37  SpO2:  [89 %-100 %] 94 %  BP: ()/(49-87) 108/53     Weight: 73.7 kg (162  lb 7.7 oz) (05/29/18 2141)  Body mass index is 25.45 kg/m².  Body surface area is 1.87 meters squared.    I/O last 3 completed shifts:  In: 2130 [P.O.:480; I.V.:1650]  Out: 2549 [Other:4279]    Physical Exam   Constitutional: He is oriented to person, place, and time. He appears well-developed. He appears ill. No distress. Nasal cannula in place.   Ill appearing   HENT:   Head: Normocephalic and atraumatic.   Right Ear: External ear normal.   Left Ear: External ear normal.   Eyes: Conjunctivae and EOM are normal. Right eye exhibits no discharge. Left eye exhibits no discharge.   Neck: Normal range of motion. Neck supple.   Cardiovascular: Regular rhythm.  Tachycardia present.    Pulmonary/Chest: Effort normal. No respiratory distress. He has no wheezes. He has rales.   Abdominal: Soft. Bowel sounds are normal. He exhibits no distension. There is no tenderness.   Musculoskeletal: He exhibits edema (trace LE).   Neurological: He is alert and oriented to person, place, and time.   Skin: Skin is warm and dry. He is not diaphoretic.   -L IJ Permacath   -Right necrotic toes 1-3-similar to previous    Psychiatric: He has a normal mood and affect.       Significant Labs:  CBC:   Recent Labs  Lab 05/30/18  0400   WBC 5.43   RBC 2.39*   HGB 7.3*   HCT 23.8*      *   MCH 30.5   MCHC 30.7*     CMP:   Recent Labs  Lab 05/30/18  0400   *  197*   CALCIUM 8.3*  8.3*   ALBUMIN 2.3*  2.3*   PROT 5.9*     139   K 4.0  4.0   CO2 24  24     104   BUN 14  14   CREATININE 2.2*  2.2*   ALKPHOS 147*   ALT 13   AST 21   BILITOT 0.5            Assessment/Plan:     ESRD (end stage renal disease)    CACHORRO on superimposed on CKD stage 4 secondary to prograft toxcity/ischemic ATN (see last admission consult 3/11-5/18/18-discharge to rehab) now dialysis dependent via [facility] (since 3/14/2018) who is admitted from [facility] for fever 100-101.6 since Friday night. Last HD yesterday 5/28 3 L removed.   -L IJ  permacath no overt signs of tunnel or exit site infection.   -Blood cultures are pending. Antibiotics per primary/Infectious Disease    Plan/recommendations:  -remains febrile with increased HR this morning, BP remain labile, but chronically low.  -no overt signs of hypervolemia on exam.  Will plan to hold RRT today and evaluate in AM.              Juaquin Ibrahim NP  Nephrology  [facility]  Pager:  [phone]

## 2018-05-30 NOTE — PROGRESS NOTES
Dr. Walsh notified of pt low BP. Also informed MD that pt has scheduled midodrine that is about to be administered. Orders to administer med as scheduled.

## 2018-05-30 NOTE — ASSESSMENT & PLAN NOTE
- Documented on Day two of stay (tmax 102.4)  - Blood Cultures and resp panel continues  negative  - CT scan + for congestion but equivocal for pneumonia. Present of moderate R- pleural effusion  - No leukocytosis but immunocompromised   - patient is anuric  - Pulmonary consulted for possible thoracentesis versus bronch.   - continue vanco and cefepime. Trough 11.9 today. Will give vanco x1 gram today. Discussed with tx pharmacy staff

## 2018-05-30 NOTE — PLAN OF CARE
Problem: Patient Care Overview  Goal: Plan of Care Review  Outcome: Ongoing (interventions implemented as appropriate)  No acute events throughout day. See vital signs and assessments in flowsheets. See below for updates on today's progress.     Pulmonary: weaned from 2L NC to RA, per Pulm MD sats to remain above >90%    Cardiovascular: remains ST with -120s, BP on low side - scheduled midodrine given.     Neurological: AO X 4    Gastrointestinal: tolerating ordered diet, BM X 2 this shift - formed, brown    Genitourinary: anuric    Endocrine: accuchecks WNL    Integumentary/Other: breakdown charted per flowsheets, complete bath given and linens changed. NPO p MN for Bronch per pulm    Infusions: none    Patient progressing towards goals as tolerated, plan of care communicated and reviewed with Lv Crocker and wife, Elizabeth. All concerns addressed. Will continue to monitor.

## 2018-05-30 NOTE — PROGRESS NOTES
Admit Note     All information provided by pt's wife due to pt asleep at this time.    Met with spouse to assess needs. Patient is a 42 y.o.  male, admitted due Fever [R50.9] per medical record. Pt received heart transplant 11/18/2014.      Patient admitted from ED on 5/29/2018.  At this time, patient presents as asleep.  At this time, patients caregiver presents as alert and oriented x 4, calm and communicative.    Household/Family Systems     Patient resides with patient's spouse and two children ages 15 and 20, at     [address] [place] [id number]      Cell:  [phone]  Wife Elizabeth cell [phone]    Additional emergency contacts:  David Crocker (brother) [phone]  Annalisa Navas (mother in law) [phone]    Patients primary caregiver is Elizabeth Crocker, patients spouse. Support system includes spouse, children and extended family. Patient does have dependents that are in need of being cared for. Patient's 15 yr old are being cared for by family.     During admission, patient's caregiver plans to stay in patient's room.  Confirmed patient and patients caregivers do have access to reliable transportation.    Cognitive Status/Learning     Patient reports reading ability as college and states patient does not have difficulty with reading, writing, seeing, hearing, comprehension and learning. Pt reports issues with short term memory.  Pt has been at [facility] and wife reports he sometimes forgets commands before he can carry them out.  Patient reports patient learns best by one on one.   Needed: No.   Highest education level: college    Vocation/Disability   .  Working for Income: No  If no, reason not working: Disability  Patient is disabled due to heart failure since 2011.  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  Pt's spouse works part time at [employer] in [employer]. Wife reports she lost her position, but not  her job, during pt's last admission. Pt reports has to work 28 hours/week to keep her own insurance.    Adherence     Patient reports a high level of adherence to patients health care regimen.  Adherence counseling and education provided. Patient verbalizes understanding.    Substance Use    Patient reports the following substance usage.    Tobacco: none, patient denies any use.  Alcohol: none, patient denies any use.  Illicit Drugs/Non-prescribed Medications: none, patient denies any use.  Patient states clear understanding of the potential impact of substance use.  Substance abstinence/cessation counseling, education and resources provided and reviewed.     Services Utilizing/ADLS    Infusion Service: Prior to admission, patient utilizing? no  Home Health: Prior to admission, patient utilizing? no Pt has used [facility] in the past and would like to use same again in future if needed.  DME: Prior to admission, no  Pulmonary/Cardiac Rehab: Prior to admission, no Pt has gone to [facility] in the past  Dialysis:  Prior to admission, no  Transplant Specialty Pharmacy:  Prior to admission, no.    Prior to admission, patient reports patient was mostly  not independent with ADLS and was not driving, due to feeling light headed and weakness.  Pt's spouse reports the pt now wears a Life Alert Button.   Pt's spouse drives.  Patient reports patient is not able to care for self at this time due to compromised medical condition (as documented in medical record) and physical weakness..  Patient indicates a willingness to care for self once medically cleared to do so.    Insurance/Medications    Insured by   Payor/Plan Subscr  Sex Relation Sub. Ins. ID Effective Group Num   1. BCBS MGD MEDI* DEISYDARRELL M* 1975 Male  [id number]                                   [address] [address]      Primary Insurance (for UNOS reporting): Public Insurance - Medicare FFS (Fee For Service)  Secondary  Insurance (for UNOS reporting): None    Patient reports patient is not sure if he will be able to obtain and afford medications at this time and at time of discharge.  Pt and spouse reports multiple issues with insurance, billing and payment plans.      Living Will/Healthcare Power of     Patient states patient has a LW and/or HCPA. Pt reports spouse is the HCPA.    provided education regarding LW and HCPA and the completion of forms.    Coping/Mental Health    Patient is not coping well, due to medical and insurance issues.  Wife indicates pt has anxiety attacks and depression. Pt is on Lexapro and Xanax prn. Wife reports she is tired and became tearful talking about her job. Support provided.      Discharge Planning    At time of discharge, patient plans to return to [facility] under the care of spouse.  Patient will transfer via wheelchair van.  Per rounds today, expected discharge date has not been medically determined at this time. Patients caregiver verbalizes understanding and is involved in treatment planning and discharge process.    Additional Concerns    Patient is being followed for needs, education, resources, information, emotional support, supportive counseling, and for supportive and skilled discharge plan of care.  providing ongoing psychosocial support, education, resources and d/c planning as needed.  SW remains available. Patient's caregiver verbalizes understanding and agreement with information reviewed,  availability and how to access available resources as needed. Patient verbalizes understanding and agreement with information reviewed, social work availability, and how to access available resources as needed.

## 2018-05-30 NOTE — ASSESSMENT & PLAN NOTE
CACHORRO on superimposed on CKD stage 4 secondary to prograft toxcity/ischemic ATN (see last admission consult 3/11-5/18/18-discharge to rehab) now dialysis dependent via [facility] (since 3/14/2018) who is admitted from [facility] for fever 100-101.6 since Friday night. Last HD yesterday 5/28 3 L removed.   -L IJ permacath no overt signs of tunnel or exit site infection.   -Blood cultures are pending. Antibiotics per primary/Infectious Disease    Plan/recommendations:  -remains febrile with increased HR this morning, BP remain labile, but chronically low.  -no overt signs of hypervolemia on exam.  Will plan to hold RRT today and evaluate in AM.

## 2018-05-30 NOTE — CONSULTS
[facility]  Podiatry  Consult Note    Patient Name: Lv Crocker  MRN: [id number]  Admission Date: 5/29/2018  Hospital Length of Stay: 1 days  Attending Physician: Heriberto Rosa MD  Primary Care Provider: Philippe Mohr MD     Inpatient consult to Podiatry  Consult performed by: PEDRO PABLO DOW  Consult ordered by: ERNESTO ALSTON  Reason for consult: gangrene  Assessment/Recommendations: See a/p        Subjective:     History of Present Illness:  Patient is a 42 y.o. male with a diagnosis of T1DM, HTN, hypothyroidism, s/p heart transplant. Consulted for B/L foot gangrene. Pt seen at bedside sleeping with no family present at bedside. Records obtained from previous notes. Pt previously reports that he began to notice darkening to toes during hospital admission after patient on pressors. High risk patient with multiple gangrenous changes to toña digits. No other pedal complaints at this time.        Scheduled Meds:   aspirin  81 mg Oral Daily    ceFEPime (MAXIPIME) IVPB  1 g Intravenous Q12H    cetirizine  5 mg Oral Daily    escitalopram oxalate  20 mg Oral Daily    gabapentin  100 mg Oral TID    heparin (porcine)  5,000 Units Subcutaneous Q8H    insulin detemir U-100  8 Units Subcutaneous BID    levalbuterol  0.63 mg Nebulization Q4H WAKE    levothyroxine  137 mcg Oral Before breakfast    magnesium oxide  400 mg Oral Daily    midodrine  10 mg Oral TID WM    mycophenolate  250 mg Oral BID    pantoprazole  40 mg Oral Daily    polyethylene glycol  17 g Oral BID    pravastatin  40 mg Oral Daily    predniSONE  2.5 mg Oral Daily    QUEtiapine  50 mg Oral QHS    tacrolimus  4 mg Oral BID     Continuous Infusions:   sodium chloride 0.9% 200 mL/hr at 05/30/18 0600     PRN Meds:acetaminophen, ALPRAZolam, benzonatate, dextrose 50%, dextrose 50%, glucagon (human recombinant), glucose, glucose, insulin aspart U-100, ondansetron, oxyCODONE    Review of patient's  "allergies indicates:   Allergen Reactions    No known drug allergies         Past Medical History:   Diagnosis Date    Anxiety     Arthritis     Ascites     Thought to be 2/2 heart tpx; liver bx 3/31/17 w/o significant fibrosis    Cardiomyopathy     Depression     Controlled "for the most part" on Lexipro    Encounter for blood transfusion     during transplant    GERD (gastroesophageal reflux disease)     Hashimoto's disease     History of CHF (congestive heart failure)     Previously EF 20% (prior to heart transplant); last EF 60%, PAP 41 as of 12/12/17; throught to be 2/2 genetics & DM1    History of coronary artery disease     H/o Coronary artery disease; resolved since heart transplant 2014    History of myocardial infarction     h/o MI x3 prior to heart transplant    HLD (hyperlipidemia) 6/11/2015    Hx of psychiatric care     Hypoglycemia unawareness in type 1 diabetes mellitus     Hypothyroid     Initially hyperthyroid 2/2 Hoshimoto's thyroiditis; now on levothyroxine 150 mcg qd    Pleural effusion due to another disorder     Thought to be 2/2 heart tpx; s/p PleuRx catheter placement and removal after 1-2 months    Psychiatric problem     Pulmonary hypertension assoc with unclear multi-factorial mechanisms 12/12/2017    PAP 41 (EF 60%) on ECHO 12/12/17    Syncope 6/30/2015    Type I diabetes mellitus, well controlled     Well controlled; Dx'd @7y/o; on N insulin 20U BID & Humalog 10U w/meals +SSI; Glu 89 11/9/17; A1c 7.2% 4/5/17    Unspecified essential hypertension 05/29/2014    Well controlled; Last /57 on 11/9/17     Past Surgical History:   Procedure Laterality Date    CARDIAC CATHETERIZATION      CARDIAC SURGERY      CHOLECYSTECTOMY      COLONOSCOPY N/A 3/13/2018    Procedure: COLONOSCOPY;  Surgeon: Tim Spencer MD;  Location: [facility] ENDO ([address] [place] [address]);  Service: Endoscopy;  Laterality: N/A;    CORONARY ANGIOPLASTY      FOOT SPLIT TRANSFER OF THE POSTERIOR TIBIALIS " TENDON PROCEDURE      HEART TRANSPLANT  2014    KNEE SURGERY      PleuRx catheter placement  01/11/2018    Plan for removal after 1-2 months by Dr. James in cardiothoracic surgery    s/p oht  November 2014    stent placemnet         Family History     Problem Relation (Age of Onset)    Cancer Brother (50)    Depression Mother    Diabetes Mother, Father, Brother, Son, Sister, Maternal Uncle, Paternal Aunt, Maternal Grandmother, Maternal Grandfather    Heart disease Mother, Brother    Hypertension Mother, Father, Brother    Kidney disease Mother, Father    Stroke Father, Brother    Vision loss Brother        Social History Main Topics    Smoking status: Never Smoker    Smokeless tobacco: Never Used    Alcohol use No    Drug use: No    Sexual activity: Yes     Partners: Female     Review of Systems   Skin: Positive for wound.     Objective:     Vital Signs (Most Recent):  Temp: (!) 102.4 °F (39.1 °C) (05/30/18 1132)  Pulse: (!) 121 (05/30/18 1126)  Resp: (!) 38 (05/30/18 1126)  BP: (!) 91/56 (05/30/18 1115)  SpO2: (!) 94 % (05/30/18 1126) Vital Signs (24h Range):  Temp:  [98.2 °F (36.8 °C)-102.4 °F (39.1 °C)] 102.4 °F (39.1 °C)  Pulse:  [] 121  Resp:  [0-47] 38  SpO2:  [89 %-100 %] 94 %  BP: ()/(49-87) 91/56     Weight: 73.7 kg (162 lb 7.7 oz)  Body mass index is 25.45 kg/m².    Foot Exam    General  Affect: appropriate       Right Foot/Ankle     Inspection and Palpation  Ecchymosis: first toe, second toe and third toe  Skin Exam: skin changes, abnormal color, ulcer and erythema; no drainage, no maceration and no cellulitis     Neurovascular  Dorsalis pedis: 1+  Posterior tibial: absent  Saphenous nerve sensation: diminished  Tibial nerve sensation: diminished  Superficial peroneal nerve sensation: diminished  Deep peroneal nerve sensation: diminished  Sural nerve sensation: diminished      Left Foot/Ankle      Inspection and Palpation  Ecchymosis: first toe  Skin Exam: skin changes, abnormal  color, ulcer and erythema; no drainage, no maceration and no cellulitis     Neurovascular  Dorsalis pedis: 1+  Posterior tibial: absent  Saphenous nerve sensation: diminished  Tibial nerve sensation: diminished  Superficial peroneal nerve sensation: diminished  Deep peroneal nerve sensation: diminished  Sural nerve sensation: diminished            Laboratory:  A1C:   Recent Labs  Lab 02/11/18  1113 04/05/18  1713 05/29/18  0303   HGBA1C 6.9* 5.1 5.9*     BMP:   Recent Labs  Lab 05/30/18  0400   *  197*     139   K 4.0  4.0     104   CO2 24  24   BUN 14  14   CREATININE 2.2*  2.2*   CALCIUM 8.3*  8.3*   MG 1.8  1.8     CBC:   Recent Labs  Lab 05/30/18  0400   WBC 5.43   RBC 2.39*   HGB 7.3*   HCT 23.8*      *   MCH 30.5   MCHC 30.7*     CMP:   Recent Labs  Lab 05/30/18  0400   *  197*   CALCIUM 8.3*  8.3*   ALBUMIN 2.3*  2.3*   PROT 5.9*     139   K 4.0  4.0   CO2 24  24     104   BUN 14  14   CREATININE 2.2*  2.2*   ALKPHOS 147*   ALT 13   AST 21   BILITOT 0.5     Coagulation: No results for input(s): PT, INR, APTT in the last 168 hours.  CRP: No results for input(s): CRP in the last 168 hours.  ESR: No results for input(s): SEDRATE in the last 168 hours.  Prealbumin: No results for input(s): PREALBUMIN in the last 48 hours.  Wound Cultures:   Recent Labs  Lab 01/11/18  0829 02/14/18  1400 03/28/18  1607 05/10/18  1411 05/15/18  1518   LABAERO No growth No growth No growth No growth No growth     Microbiology Results (last 7 days)     Procedure Component Value Units Date/Time    Cryptococcal antigen [[id number]]     Order Status:  No result Specimen:  Blood from Blood     Culture, Respiratory with Gram Stain [[id number]]     Order Status:  No result Specimen:  Respiratory from Sputum     Blood culture #2 **CANNOT BE ORDERED STAT** [[id number]] Collected:  05/29/18 0319    Order Status:  Completed Specimen:  Blood from Peripheral, Hand, Left Updated:   05/30/18 0612     Blood Culture, Routine No Growth to date     Blood Culture, Routine No Growth to date    Blood culture #1 **CANNOT BE ORDERED STAT** [[id number]] Collected:  05/29/18 0303    Order Status:  Completed Specimen:  Blood from Peripheral, Wrist, Right Updated:  05/30/18 0612     Blood Culture, Routine No Growth to date     Blood Culture, Routine No Growth to date    Respiratory Viral Panel by PCR [facility]; Nasal Swab [[id number]] Collected:  05/29/18 2333    Order Status:  Sent Specimen:  Respiratory Updated:  05/29/18 2349    Respiratory Viral Panel by PCR [facility]; Sputum [[id number]]     Order Status:  Canceled Specimen:  Respiratory         Specimen (12h ago through future)    None          Diagnostic Results:    X-Ray: I have reviewed all pertinent results/findings within the past 24 hours.  Ordered    Clinical Findings:  Dry gangrenous changes noted to digits of toña foot, well demarchated. No periwound SOI noted, stable.               Assessment/Plan:     Gangrene    Pt with dry, stable gangrene to multiple digits of toña foot, no SOI or drainage noted  No acute surgical intervention indicated at this time.   As discussed previously with heart transplant team, as pt is high risk, defer surgery at this time.   Wounds dressed with betadine, nursing to paint wounds with betadine daily, orders placed  Xray ordered  SULMA ordered, if abnormal consider interventional cardiology vs vascular sx consult.   Podiatry will follow with studies.  Podiatry will sign off for now, with dry and stable gangrene, if worsens please re-consult podiatry as needed          ESRD (end stage renal disease)    Per primary        Type 1 diabetes mellitus with stage 3 chronic kidney disease    Per primary        Heart transplanted    Per primary        Physical debility    Per primary            Podiatry will sign off. If worsens, please re-consult as necessary.     Kari Brown MD  Podiatry  [facility]

## 2018-05-30 NOTE — PROGRESS NOTES
[facility]  Heart Transplant  Progress Note    Patient Name: Lv Crocker  MRN: [id number]  Admission Date: 5/29/2018  Hospital Length of Stay: 1 days  Attending Physician: Heriberto Rosa MD  Primary Care Provider: Philippe Mohr MD  Principal Problem:Fever    Subjective:     Interval History:     Febrile (tmax 102.2). All cultures including RSV still in progress but negative. Net negative 419 with 2000mL UF last night. Report nausea wo emesis this morning. ID contacted and advised to continue vanco and cefepime for now. Pulmonary requested to review CT scan and possibly do bronch.    Continuous Infusions:   sodium chloride 0.9% 200 mL/hr at 05/30/18 0600     Scheduled Meds:   aspirin  81 mg Oral Daily    ceFEPime (MAXIPIME) IVPB  1 g Intravenous Q12H    cetirizine  5 mg Oral Daily    escitalopram oxalate  20 mg Oral Daily    gabapentin  100 mg Oral TID    heparin (porcine)  5,000 Units Subcutaneous Q8H    insulin detemir U-100  8 Units Subcutaneous BID    levalbuterol  0.63 mg Nebulization Q4H WAKE    levothyroxine  137 mcg Oral Before breakfast    magnesium oxide  400 mg Oral Daily    midodrine  10 mg Oral TID WM    mycophenolate  250 mg Oral BID    pantoprazole  40 mg Oral Daily    polyethylene glycol  17 g Oral BID    pravastatin  40 mg Oral Daily    predniSONE  2.5 mg Oral Daily    QUEtiapine  50 mg Oral QHS    tacrolimus  4 mg Oral BID     PRN Meds:acetaminophen, ALPRAZolam, benzonatate, dextrose 50%, dextrose 50%, glucagon (human recombinant), glucose, glucose, insulin aspart U-100, ondansetron, oxyCODONE    Review of patient's allergies indicates:   Allergen Reactions    No known drug allergies      Objective:     Vital Signs (Most Recent):  Temp: (!) 101.7 °F (38.7 °C) (05/30/18 1231)  Pulse: (!) 119 (05/30/18 1300)  Resp: 20 (05/30/18 1300)  BP: (!) 82/51 (05/30/18 1300)  SpO2: 96 % (05/30/18 1300) Vital Signs (24h Range):  Temp:  [98.2 °F (36.8 °C)-102.4  °F (39.1 °C)] 101.7 °F (38.7 °C)  Pulse:  [] 119  Resp:  [0-47] 20  SpO2:  [89 %-100 %] 96 %  BP: ()/(49-87) 82/51     Patient Vitals for the past 72 hrs (Last 3 readings):   Weight   05/29/18 2141 73.7 kg (162 lb 7.7 oz)   05/29/18 0152 79.9 kg (176 lb 2.4 oz)     Body mass index is 25.45 kg/m².      Intake/Output Summary (Last 24 hours) at 05/30/18 1427  Last data filed at 05/30/18 1400   Gross per 24 hour   Intake             2285 ml   Output             2549 ml   Net             -264 ml       Hemodynamic Parameters:       Telemetry: sinus tachycardia noted    Physical Exam  Constitutional: He is oriented to person, place, and time.   Mildly uncomfortable on bed this morning.  Eyes: Pupils are equal, round, and reactive to light.   Neck: No JVD present.   Cardiovascular: Normal rate and regular rhythm.    No murmur heard.  Tachycardia present   Pulmonary/Chest: Effort normal and breath sounds normal.   Abdominal: Soft. Bowel sounds are normal.   No fluid wave   Musculoskeletal: He exhibits no edema or tenderness.   Neurological: He is alert and oriented to person, place, and time.   Skin: Skin is warm and dry. Dry gangrene to toes bilaterally. Left great toe gangrene improved and less tender. No drainage  Psychiatric: mood and affect not evaluated but grossly normal  Nursing note reviewed.       Significant Labs:  CBC:    Recent Labs  Lab 05/29/18  0303 05/30/18  0400   WBC 7.52 5.43   RBC 2.59* 2.39*   HGB 7.8* 7.3*   HCT 26.0* 23.8*    185   * 100*   MCH 30.1 30.5   MCHC 30.0* 30.7*     BNP:    Recent Labs  Lab 05/29/18  0303   BNP 1,228*     CMP:    Recent Labs  Lab 05/29/18  0303 05/30/18  0400    197*  197*   CALCIUM 9.0 8.3*  8.3*   ALBUMIN 2.5* 2.3*  2.3*   PROT 6.2 5.9*    139  139   K 3.8 4.0  4.0   CO2 25 24  24    104  104   BUN 28* 14  14   CREATININE 4.2* 2.2*  2.2*   ALKPHOS 151* 147*   ALT 12 13   AST 20 21   BILITOT 0.5 0.5      Coagulation:    No results for input(s): PT, INR, APTT in the last 168 hours.  LDH:  No results for input(s): LDH in the last 72 hours.  Microbiology:  Microbiology Results (last 7 days)     Procedure Component Value Units Date/Time    Cryptococcal antigen [[id number]] Collected:  05/30/18 1222    Order Status:  Sent Specimen:  Blood from Blood Updated:  05/30/18 1242    Culture, Respiratory with Gram Stain [[id number]]     Order Status:  No result Specimen:  Respiratory from Sputum     Blood culture #2 **CANNOT BE ORDERED STAT** [[id number]] Collected:  05/29/18 0319    Order Status:  Completed Specimen:  Blood from Peripheral, Hand, Left Updated:  05/30/18 0612     Blood Culture, Routine No Growth to date     Blood Culture, Routine No Growth to date    Blood culture #1 **CANNOT BE ORDERED STAT** [[id number]] Collected:  05/29/18 0303    Order Status:  Completed Specimen:  Blood from Peripheral, Wrist, Right Updated:  05/30/18 0612     Blood Culture, Routine No Growth to date     Blood Culture, Routine No Growth to date    Respiratory Viral Panel by PCR [facility]; Nasal Swab [[id number]] Collected:  05/29/18 2333    Order Status:  Sent Specimen:  Respiratory Updated:  05/29/18 2349    Respiratory Viral Panel by PCR [facility]; Sputum [[id number]]     Order Status:  Canceled Specimen:  Respiratory           I have reviewed all pertinent labs within the past 24 hours.    Estimated Creatinine Clearance: 40.1 mL/min (A) (based on SCr of 2.2 mg/dL (H)).    Diagnostic Results:  CT: No results found in the last 24 hours.    Patchy subsegmental pulmonary opacities throughout both lungs suggest infection versus noninfectious inflammation, less likely edema.  Clinical correlation is advised.    Moderate volume right-sided pleural effusion with trace left-sided pleural fluid.    Persistent round right lower lobe opacity, similar to prior examination.  This is nonspecific, although could represent chronic rounded atelectasis.  Continued imaging  follow-up is advised to exclude underlying mass.    New moderate T12 compression deformity.    Stable postsurgical change of heart transplantation.    Small volume of abdominal ascites.    This report was flagged in Epic as abnormal.    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 She denies him having any SOB, chest pain, diarrhea, nausea, vomiting. He doesnot make any urine since he has been non HD. Fever was noted to be as high as 101.6.She also notes that he has been coughing since being in ER but not before that.No SOB with PT at rehab.No dizziness or syncope. Wife reports he got an extra HD on Thursday because nephrology wanted to remove extra fluid.He has had a good appetite and tolerating PO and per wife has been working with PT and getting stronger. He has finished his abx course from last visit already.    * Fever    - Documented on Day two of stay (tmax 102.4)  - Blood Cultures and resp panel continues  Negative  - lactate 2.2   - CT scan + for congestion but equivocal for pneumonia. Present of moderate R- pleural effusion  - No leukocytosis but immunocompromised   - patient is anuric  - Pulmonary consulted for possible thoracentesis versus bronch.   - continue vanco and cefepime. Trough 11.9 today. Will give vanco x1 gram today. Discussed with tx pharmacy staff        Physical debility    - secondary to prolonged illness  - PT/OT in patient and d/c back to rehab once  cleared          Heart transplanted    -S/p OHT 2014  -Continue prednisone and cellcept and tacrolimus  -Tacro level 4.5 yesterday  - increased dose to 4/4 from yesterday. Will continue to monitor and adjust as needed        Depression    - continue current at home depression regimen.  - will consider psych if necessary          Gangrene    - Dry gangrene developed previous admission with pressor use  - Will continue with betadine rx as current.   - Podiatry on board  - Foot XR and dopplers pending          ESRD (end stage renal disease)    - on HD M, W, F  - BNP elevated > 1200 on admission  - s/p 2L UF yesterday night  - Will hold UF for now. Will consider regular HD as scheduled depending  - denies congestion        Pleural effusion    Persistent right sided pleural effusion  Ct chest show moderate amount.   Will monitor during stay  Consider thoracentesis by pulmonary.        Type 1 diabetes mellitus with stage 3 chronic kidney disease    Insulin sliding scale  -continue detemir  - endocrine consult        Hypothyroidism due to Hashimoto's thyroiditis    - continue synthroid at home dose  - TSH 12.4, T4 4.4   - subclinical hypothyroidism.         Anemia    - hb 8-9 at time of discharge.   - trending down after admission possibly from fq blood draws compounded by current/chronic infection.  -denies any bleeding  -continue to monitor  - consider iron infusion or oral replacement          Discussed plan of care with Dr. Hanna Walsh MD  Heart Transplant  [facility]

## 2018-05-30 NOTE — NURSING
Rounds Report: Attended interdisciplinary rounds this morning with the transplant team including SW, physicians, fellows,  mid-level providers, and transplant coordinators.  Discussed plan of care, including possible bronch. Pt with 102 fever, ID and podiatry consulted. Discharge in a week.

## 2018-05-30 NOTE — ASSESSMENT & PLAN NOTE
Pt with dry, stable gangrene to multiple digits of toña foot, no SOI or drainage noted  No acute surgical intervention indicated at this time.   As discussed previously with heart transplant team, as pt is high risk, defer surgery at this time.   Wounds dressed with betadine, nursing to paint wounds with betadine daily, orders placed  Xray ordered  SULMA ordered  Podiatry will follow from a distance

## 2018-05-31 PROBLEM — L89.92 PRESSURE ULCER, STAGE 2: Status: ACTIVE | Noted: 2018-01-01

## 2018-05-31 PROBLEM — I96 NECROTIC TOES: Status: ACTIVE | Noted: 2018-01-01

## 2018-05-31 PROBLEM — R91.8 PULMONARY INFILTRATES ON CXR: Status: ACTIVE | Noted: 2018-01-01

## 2018-05-31 NOTE — ASSESSMENT & PLAN NOTE
- Dry gangrene developed previous admission with pressor use  - Will continue with betadine rx as current.   - Podiatry on board  - Foot XR and dopplers shows no changes concerning for infection

## 2018-05-31 NOTE — SUBJECTIVE & OBJECTIVE
Interval History:     Afebrile since 5/30 12:27 pm. On vanco and cefepime. Low BPs with MAPs at 65. On 3L NC. Cultures and fungal studies are pending. hbg drop to 6.5 today with no overt signs of bleeding.     Review of Systems   Constitutional: Negative for activity change, appetite change, chills, fatigue, fever and unexpected weight change.   HENT: Negative for congestion, postnasal drip, sinus pressure and sore throat.    Eyes: Negative for visual disturbance.   Respiratory: Negative for cough, shortness of breath and wheezing.    Cardiovascular: Negative for chest pain, palpitations and leg swelling.   Gastrointestinal: Negative for abdominal distention, abdominal pain, diarrhea, nausea and vomiting.   Genitourinary: Positive for decreased urine volume and enuresis.   Musculoskeletal: Negative for arthralgias and myalgias.   Skin: Negative for rash.   Allergic/Immunologic: Positive for immunocompromised state.   Neurological: Negative for dizziness, syncope, weakness, light-headedness and headaches.   Psychiatric/Behavioral: Negative for confusion and decreased concentration.     Objective:     Vital Signs (Most Recent):  Temp: 98 °F (36.7 °C) (05/31/18 1130)  Pulse: 97 (05/31/18 1130)  Resp: (!) 28 (05/31/18 1130)  BP: (!) 80/60 (05/31/18 1130)  SpO2: 100 % (05/31/18 1130) Vital Signs (24h Range):  Temp:  [97.7 °F (36.5 °C)-101.7 °F (38.7 °C)] 98 °F (36.7 °C)  Pulse:  [] 97  Resp:  [11-28] 28  SpO2:  [86 %-100 %] 100 %  BP: ()/(50-64) 80/60     Weight: 73.7 kg (162 lb 7.7 oz)  Body mass index is 25.45 kg/m².    Estimated Creatinine Clearance: 29.4 mL/min (A) (based on SCr of 3 mg/dL (H)).    Physical Exam   Constitutional: He is oriented to person, place, and time. He appears well-developed and well-nourished. No distress.   HENT:   Head: Normocephalic and atraumatic.   Right Ear: External ear normal.   Left Ear: External ear normal.   Eyes: EOM are normal. Pupils are equal, round, and reactive to  light.   Neck: Normal range of motion. Neck supple.   Cardiovascular: Normal rate, regular rhythm, normal heart sounds and intact distal pulses.    No murmur heard.  Pulmonary/Chest: Effort normal and breath sounds normal. No respiratory distress. He has no wheezes.   Abdominal: Soft. Bowel sounds are normal. He exhibits no distension. There is no tenderness.   Musculoskeletal: Normal range of motion. He exhibits no edema, tenderness or deformity.   Neurological: He is alert and oriented to person, place, and time. No cranial nerve deficit.   Skin: Skin is warm and dry. No rash noted. He is not diaphoretic.   Psychiatric: He has a normal mood and affect.       Significant Labs:   Blood Culture:   Recent Labs  Lab 03/25/18  1606 05/05/18  0644 05/05/18  0812 05/29/18  0303 05/29/18  0319   LABBLOO No growth after 5 days. No growth after 5 days. No growth after 5 days. No Growth to date  No Growth to date  No Growth to date No Growth to date  No Growth to date  No Growth to date     CBC:   Recent Labs  Lab 05/30/18  0400 05/31/18  0300   WBC 5.43 4.63   HGB 7.3* 6.6*   HCT 23.8* 22.2*    175     CMP:   Recent Labs  Lab 05/30/18  0400 05/31/18  0300     139 136   K 4.0  4.0 4.2     104 103   CO2 24  24 24   *  197* 237*   BUN 14  14 26*   CREATININE 2.2*  2.2* 3.0*   CALCIUM 8.3*  8.3* 8.4*   PROT 5.9* 5.4*   ALBUMIN 2.3*  2.3* 2.1*   BILITOT 0.5 0.5   ALKPHOS 147* 136*   AST 21 15   ALT 13 11   ANIONGAP 11  11 9   EGFRNONAA 35.1*  35.1* 24.1*     Respiratory Culture:   Recent Labs  Lab 03/13/18  1701 03/14/18  1137 03/15/18  1541 03/25/18  1534 04/26/18  1329   GSRESP >10epis/lfp and <than many WBC's  Predominance of oropharyngeal hank. Please recollect. No WBC's  No organisms seen <10 epithelial cells per low power field.  Rare WBC's   No organisms seen <10 epithelial cells per low power field.  Rare WBC's  Rare Gram positive cocci <10 epithelial cells per low power  field.  No WBC's  Rare Gram negative rods   RESPIRATORYC Specimen inadequate - culture not performed No growth No growth Normal respiratory hank Normal respiratory hank     Urine Culture:   Recent Labs  Lab 12/10/17  1047 12/11/17 2022   LABURIN KLEBSIELLA PNEUMONIAE>100,000 cfu/ml KLEBSIELLA PNEUMONIAE>100,000 cfu/ml     Urine Studies:   Recent Labs  Lab 03/14/18  1139   COLORU Mayra   APPEARANCEUA Cloudy*   PHUR 5.0   SPECGRAV 1.020   PROTEINUA 1+*   GLUCUA Negative   KETONESU Negative   BILIRUBINUA Negative   OCCULTUA Negative   NITRITE Negative   UROBILINOGEN Negative   LEUKOCYTESUR Negative   RBCUA 0   WBCUA 6*   BACTERIA Occasional   SQUAMEPITHEL 0   HYALINECASTS 0     Cryptococcal antigen [[id number]] Collected: 05/30/18 1222   Order Status: Completed Specimen: Blood from Blood Updated: 05/31/18 1228    Cryptococcal Ag, Blood Negative   Culture, Respiratory with Gram Stain [[id number]]    Order Status: No result Specimen: Respiratory from Sputum    Respiratory Viral Panel by PCR [facility]; Nasal Swab [[id number]] Collected: 05/29/18 2333   Order Status: Completed Specimen: Respiratory Updated: 05/31/18 1042    Respiratory Virus Panel, source Nasal Swab    RVP - Adenovirus Not Detected        Enterovirus Not Detected    Comment: Cross-reactivity has been observed between certain Rhinovirus   strains and the Enterovirus assay.        Human Bocavirus Not Detected    Human Coronavirus Not Detected    Comment: The Human Coronavirus assay detects Human coronavirus types   229E, OC43,NL63 and HKU1.        RVP - Human Metapneumovirus (hMPV) Not Detected    RVP - Influenza A Not Detected    Influenza A - G1V7-30 Not Detected    RVP - Influenza B Not Detected    Parainfluenza Not Detected    Respiratory Syncytial VirusVirus (RSV) A Not Detected    Comment: The Respiratory Syncytial Viral assay detects types A and B,   however it does not distinguish between the two.        RVP - Rhinovirus Not Detected

## 2018-05-31 NOTE — ASSESSMENT & PLAN NOTE
- on HD M, W, F  - BNP elevated > 1200 on admission  - s/p 2L UF yesterday night  - continue HD as scheduled. Appreciate nephrology assistance

## 2018-05-31 NOTE — SUBJECTIVE & OBJECTIVE
Interval History:     Afebrile . Sleep well with CPAP. BP reported to be labile this morning. Had 1 PRBC due to anemia, Hb 6.6. Broch deferred due to hypotension. Will be attempted tomorrow    Continuous Infusions:   sodium chloride 0.9%       Scheduled Meds:   aspirin  81 mg Oral Daily    ceFEPime (MAXIPIME) IVPB  1 g Intravenous Q12H    cetirizine  5 mg Oral Daily    enoxaparin  30 mg Subcutaneous Daily    escitalopram oxalate  20 mg Oral Daily    gabapentin  100 mg Oral TID    insulin detemir U-100  8 Units Subcutaneous BID    levalbuterol  0.63 mg Nebulization Q4H WAKE    levothyroxine  137 mcg Oral Before breakfast    magnesium oxide  400 mg Oral Daily    midodrine  10 mg Oral TID WM    mycophenolate  250 mg Oral BID    pantoprazole  40 mg Oral Daily    polyethylene glycol  17 g Oral BID    pravastatin  40 mg Oral Daily    predniSONE  2.5 mg Oral Daily    QUEtiapine  50 mg Oral QHS    tacrolimus  5 mg Oral BID     PRN Meds:sodium chloride, acetaminophen, ALPRAZolam, benzonatate, dextrose 50%, dextrose 50%, glucagon (human recombinant), glucose, glucose, insulin aspart U-100, magnesium sulfate IVPB, midazolam, ondansetron, oxyCODONE, sodium phosphate IVPB, sodium phosphate IVPB, sodium phosphate IVPB    Review of patient's allergies indicates:   Allergen Reactions    No known drug allergies      Objective:     Vital Signs (Most Recent):  Temp: 98 °F (36.7 °C) (05/31/18 1130)  Pulse: 96 (05/31/18 1505)  Resp: 14 (05/31/18 1505)  BP: 104/67 (05/31/18 1400)  SpO2: 98 % (05/31/18 1505) Vital Signs (24h Range):  Temp:  [97.7 °F (36.5 °C)-98.6 °F (37 °C)] 98 °F (36.7 °C)  Pulse:  [] 96  Resp:  [11-28] 14  SpO2:  [86 %-100 %] 98 %  BP: ()/(0-67) 104/67     Patient Vitals for the past 72 hrs (Last 3 readings):   Weight   05/29/18 2141 73.7 kg (162 lb 7.7 oz)   05/29/18 0152 79.9 kg (176 lb 2.4 oz)     Body mass index is 25.45 kg/m².      Intake/Output Summary (Last 24 hours) at 05/31/18  1522  Last data filed at 05/31/18 1400   Gross per 24 hour   Intake             1435 ml   Output                0 ml   Net             1435 ml       Hemodynamic Parameters:  CVP:  [12 mmHg-13 mmHg] 12 mmHg    Telemetry: reviewed    Physical Exam     Constitutional: He is oriented to person, place, and time.   Mildly uncomfortable on bed this morning.  Eyes: Pupils are equal, round, and reactive to light.   Neck: No JVD present.   Cardiovascular: Normal rate and regular rhythm.    No murmur heard.  Tachycardia present   Pulmonary/Chest: Effort normal and breath sounds normal.   Abdominal: Soft. Bowel sounds are normal.   No fluid wave   Musculoskeletal: He exhibits no edema or tenderness.   Neurological: He is alert and oriented to person, place, and time.   Skin: Skin is warm and dry. Dry gangrene to toes bilaterally. Left great toe gangrene improved and less tender. No drainage  Psychiatric: mood and affect not evaluated but grossly normal  Nursing note reviewed.    Significant Labs:  CBC:    Recent Labs  Lab 05/29/18  0303 05/30/18  0400 05/31/18  0300   WBC 7.52 5.43 4.63   RBC 2.59* 2.39* 2.21*   HGB 7.8* 7.3* 6.6*   HCT 26.0* 23.8* 22.2*    185 175   * 100* 101*   MCH 30.1 30.5 29.9   MCHC 30.0* 30.7* 29.7*     BNP:    Recent Labs  Lab 05/29/18  0303   BNP 1,228*     CMP:    Recent Labs  Lab 05/29/18  0303 05/30/18  0400 05/31/18  0300 05/31/18  1345    197*  197* 237* 83   CALCIUM 9.0 8.3*  8.3* 8.4* 6.5*   ALBUMIN 2.5* 2.3*  2.3* 2.1* 1.6*   PROT 6.2 5.9* 5.4*  --     139  139 136 138   K 3.8 4.0  4.0 4.2 3.1*   CO2 25 24  24 24 18*    104  104 103 113*   BUN 28* 14  14 26* 24*   CREATININE 4.2* 2.2*  2.2* 3.0* 2.3*   ALKPHOS 151* 147* 136*  --    ALT 12 13 11  --    AST 20 21 15  --    BILITOT 0.5 0.5 0.5  --       Coagulation:   No results for input(s): PT, INR, APTT in the last 168 hours.  LDH:    Recent Labs  Lab 05/31/18  0800         Microbiology:  Microbiology Results (last 7 days)     Procedure Component Value Units Date/Time    Cryptococcal antigen [[id number]] Collected:  05/30/18 1222    Order Status:  Completed Specimen:  Blood from Blood Updated:  05/31/18 1228     Cryptococcal Ag, Blood Negative    Respiratory Viral Panel by PCR [facility]; Nasal Swab [[id number]] Collected:  05/29/18 2333    Order Status:  Completed Specimen:  Respiratory Updated:  05/31/18 1042     Respiratory Virus Panel, source Nasal Swab     RVP - Adenovirus Not Detected     Comment: Detects Serotypes B and E. Detection of Serotype C may   be limited. If Adenovirus infection is suspected and a   Not Detected result is returned the sample should be   re-tested for Adenovirus using an independent method  (e.g. [employer] Adenovirus Quantitative Real-Time  PCR test.          Enterovirus Not Detected     Comment: Cross-reactivity has been observed between certain Rhinovirus  strains and the Enterovirus assay.          Human Bocavirus Not Detected     Human Coronavirus Not Detected     Comment: The Human Coronavirus assay detects Human coronavirus types  229E, OC43,NL63 and HKU1.          RVP - Human Metapneumovirus (hMPV) Not Detected     RVP - Influenza A Not Detected     Influenza A - N5I6-19 Not Detected     RVP - Influenza B Not Detected     Parainfluenza Not Detected     Respiratory Syncytial VirusVirus (RSV) A Not Detected     Comment: The Respiratory Syncytial Viral assay detects types A and B,  however it does not distinguish between the two.          RVP - Rhinovirus Not Detected     Comment: Cross-Reactivity has been observed between certain   Rhinovirus strains and the Enterovirus assay.  Target Enriched Mulitplex Polymerase Chain Reaction (TEM-PCR)  allows for the detection of multiple pathogens out of a single  reaction.  This test was developed and its performance   characteristics determined by [employer].  It has not   been cleared or approved  by the U.S.Food and Drug Administration.  Results should be used in conjunction with clinical findings,   and should not form the sole basis for a diagnosis or treatment  decision.  TEM-PCR is a licensed technology of [employer].         Narrative:       Receiving Lab:->[facility]    Blood culture #2 **CANNOT BE ORDERED STAT** [[id number]] Collected:  05/29/18 0319    Order Status:  Completed Specimen:  Blood from Peripheral, Hand, Left Updated:  05/31/18 0612     Blood Culture, Routine No Growth to date     Blood Culture, Routine No Growth to date     Blood Culture, Routine No Growth to date    Blood culture #1 **CANNOT BE ORDERED STAT** [[id number]] Collected:  05/29/18 0303    Order Status:  Completed Specimen:  Blood from Peripheral, Wrist, Right Updated:  05/31/18 0612     Blood Culture, Routine No Growth to date     Blood Culture, Routine No Growth to date     Blood Culture, Routine No Growth to date    Culture, Respiratory with Gram Stain [[id number]]     Order Status:  No result Specimen:  Respiratory from Sputum     Respiratory Viral Panel by PCR [facility]; Sputum [[id number]]     Order Status:  Canceled Specimen:  Respiratory           I have reviewed all pertinent labs within the past 24 hours.      Estimated Creatinine Clearance: 38.3 mL/min (A) (based on SCr of 2.3 mg/dL (H)).    Diagnostic Results:

## 2018-05-31 NOTE — ASSESSMENT & PLAN NOTE
Persistent right sided pleural effusion  Ct chest show moderate amount.   Will monitor during stay  Bronch abd BAL tomorrow by pulmomary

## 2018-05-31 NOTE — PLAN OF CARE
Problem: Physical Therapy Goal  Goal: Physical Therapy Goal  Goals to be met by: 18     Patient will increase functional independence with mobility by performin. Supine to sit with [place]. -not met  2. Sit to supine with [place]. -not met  3. Rolling to Left and Right with [place]. -not met  4. Sit to stand transfer with Supervision. -not met  5. Gait  x 200 feet with Stand-by Assistance with or without assisted device. -not met  6. Ascend/descend 4 stairs with bilateral Handrails Stand-by Assistance. -not met  7. Lower extremity exercise program x20 reps per handout, with independence. -not met    Goals appropriate at this time. CUONG Thomas 2018

## 2018-05-31 NOTE — PROGRESS NOTES
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  Patient denies any localizing signs or symptoms of infection.  Exam notable for new oxygen requirement, gangrenous toes, stage I left ischial wound. If patient with persistent fevers, may need to evaluate for possible fungal etiologies, sample pleural effusion.    Recommendations:  - cont vanc and cefepime for now, for treatment of presumed bacterial PNA/ HAP  - RVP (sputum) negative  - blood cx ngtd  - follow up  CMV PCR and fungal labs  - redose vancomycin based on levels and CRRT timing   - would cont to recommend bronchoscopy. Check cell count w dif, aspergillus, AFB stain and culture, bacterial cultures. Pt appears hemodynamically unstable given low BPs for bronch at this time.               Anticipated Disposition: pending clinical improvement     Thank you for your consult. I will follow-up with patient. Please contact us if you have any additional questions.    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 Patient reports low grade fevers that have not lasted over an hour. He denies cough, dyspnea, sore throat, rhinorrhea, abdominal pain, n/v, diarrhea. He reports the ulcers/gangrenous toes have improved since discharge. He also reports a sacral ulcer that wound care has been following and denies drainage from the area.     Interval History:     Afebrile since 5/30 12:27 pm. On vanco and cefepime. Low BPs with MAPs at 65. On 3L NC. Cultures and fungal studies are pending. hbg drop to 6.5 today with no overt signs of bleeding.     Review of Systems   Constitutional: Negative for activity change, appetite change, chills, fatigue, fever and unexpected weight change.   HENT: Negative for congestion, postnasal drip, sinus pressure and sore throat.    Eyes: Negative for visual disturbance.   Respiratory: Negative for cough, shortness of breath and wheezing.    Cardiovascular: Negative for chest pain, palpitations and leg swelling.   Gastrointestinal: Negative for abdominal distention, abdominal pain, diarrhea, nausea and vomiting.   Genitourinary: Positive for decreased urine volume and enuresis.   Musculoskeletal: Negative for arthralgias and myalgias.   Skin: Negative for rash.   Allergic/Immunologic: Positive for immunocompromised state.    Neurological: Negative for dizziness, syncope, weakness, light-headedness and headaches.   Psychiatric/Behavioral: Negative for confusion and decreased concentration.     Objective:     Vital Signs (Most Recent):  Temp: 98 °F (36.7 °C) (05/31/18 1130)  Pulse: 97 (05/31/18 1130)  Resp: (!) 28 (05/31/18 1130)  BP: (!) 80/60 (05/31/18 1130)  SpO2: 100 % (05/31/18 1130) Vital Signs (24h Range):  Temp:  [97.7 °F (36.5 °C)-101.7 °F (38.7 °C)] 98 °F (36.7 °C)  Pulse:  [] 97  Resp:  [11-28] 28  SpO2:  [86 %-100 %] 100 %  BP: ()/(50-64) 80/60     Weight: 73.7 kg (162 lb 7.7 oz)  Body mass index is 25.45 kg/m².    Estimated Creatinine Clearance: 29.4 mL/min (A) (based on SCr of 3 mg/dL (H)).    Physical Exam   Constitutional: He is oriented to person, place, and time. He appears well-developed and well-nourished. No distress.   HENT:   Head: Normocephalic and atraumatic.   Right Ear: External ear normal.   Left Ear: External ear normal.   Eyes: EOM are normal. Pupils are equal, round, and reactive to light.   Neck: Normal range of motion. Neck supple.   Cardiovascular: Normal rate, regular rhythm, normal heart sounds and intact distal pulses.    No murmur heard.  Pulmonary/Chest: Effort normal and breath sounds normal. No respiratory distress. He has no wheezes.   Abdominal: Soft. Bowel sounds are normal. He exhibits no distension. There is no tenderness.   Musculoskeletal: Normal range of motion. He exhibits no edema, tenderness or deformity.   Neurological: He is alert and oriented to person, place, and time. No cranial nerve deficit.   Skin: Skin is warm and dry. No rash noted. He is not diaphoretic.   Psychiatric: He has a normal mood and affect.       Significant Labs:   Blood Culture:   Recent Labs  Lab 03/25/18  1606 05/05/18  0644 05/05/18  0812 05/29/18  0303 05/29/18  0319   LABOO No growth after 5 days. No growth after 5 days. No growth after 5 days. No Growth to date  No Growth to date  No Growth  to date No Growth to date  No Growth to date  No Growth to date     CBC:   Recent Labs  Lab 05/30/18  0400 05/31/18  0300   WBC 5.43 4.63   HGB 7.3* 6.6*   HCT 23.8* 22.2*    175     CMP:   Recent Labs  Lab 05/30/18  0400 05/31/18  0300     139 136   K 4.0  4.0 4.2     104 103   CO2 24  24 24   *  197* 237*   BUN 14  14 26*   CREATININE 2.2*  2.2* 3.0*   CALCIUM 8.3*  8.3* 8.4*   PROT 5.9* 5.4*   ALBUMIN 2.3*  2.3* 2.1*   BILITOT 0.5 0.5   ALKPHOS 147* 136*   AST 21 15   ALT 13 11   ANIONGAP 11  11 9   EGFRNONAA 35.1*  35.1* 24.1*     Respiratory Culture:   Recent Labs  Lab 03/13/18  1701 03/14/18  1137 03/15/18  1541 03/25/18  1534 04/26/18  1329   GSRESP >10epis/lfp and <than many WBC's  Predominance of oropharyngeal hank. Please recollect. No WBC's  No organisms seen <10 epithelial cells per low power field.  Rare WBC's   No organisms seen <10 epithelial cells per low power field.  Rare WBC's  Rare Gram positive cocci <10 epithelial cells per low power field.  No WBC's  Rare Gram negative rods   RESPIRATORYC Specimen inadequate - culture not performed No growth No growth Normal respiratory hank Normal respiratory hank     Urine Culture:   Recent Labs  Lab 12/10/17  1047 12/11/17 2022   LABURIN KLEBSIELLA PNEUMONIAE>100,000 cfu/ml KLEBSIELLA PNEUMONIAE>100,000 cfu/ml     Urine Studies:   Recent Labs  Lab 03/14/18  1139   COLORU Mayra   APPEARANCEUA Cloudy*   PHUR 5.0   SPECGRAV 1.020   PROTEINUA 1+*   GLUCUA Negative   KETONESU Negative   BILIRUBINUA Negative   OCCULTUA Negative   NITRITE Negative   UROBILINOGEN Negative   LEUKOCYTESUR Negative   RBCUA 0   WBCUA 6*   BACTERIA Occasional   SQUAMEPITHEL 0   HYALINECASTS 0     Cryptococcal antigen [[id number]] Collected: 05/30/18 1222   Order Status: Completed Specimen: Blood from Blood Updated: 05/31/18 1228    Cryptococcal Ag, Blood Negative   Culture, Respiratory with Gram Stain [[id number]]    Order Status: No  result Specimen: Respiratory from Sputum    Respiratory Viral Panel by PCR [facility]; Nasal Swab [[id number]] Collected: 05/29/18 2333   Order Status: Completed Specimen: Respiratory Updated: 05/31/18 1042    Respiratory Virus Panel, source Nasal Swab    RVP - Adenovirus Not Detected        Enterovirus Not Detected    Comment: Cross-reactivity has been observed between certain Rhinovirus   strains and the Enterovirus assay.        Human Bocavirus Not Detected    Human Coronavirus Not Detected    Comment: The Human Coronavirus assay detects Human coronavirus types   229E, OC43,NL63 and HKU1.        RVP - Human Metapneumovirus (hMPV) Not Detected    RVP - Influenza A Not Detected    Influenza A - E6A1-83 Not Detected    RVP - Influenza B Not Detected    Parainfluenza Not Detected    Respiratory Syncytial VirusVirus (RSV) A Not Detected    Comment: The Respiratory Syncytial Viral assay detects types A and B,   however it does not distinguish between the two.        RVP - Rhinovirus Not Detected

## 2018-05-31 NOTE — PROGRESS NOTES
Patient identified via spelling of name and date of birth. Patient denies blood transfusion reaction. Consent obtained for use of contrast, states gave verbal permission and wants his brother to sign consent. Optison 3ml IVP jose eduardo Terry. Saline lock flushed pre and post use under aseptic technique. Optison 3ml IVP used as contrast for 2 d imaging.  Tolerated procedure well.

## 2018-05-31 NOTE — PROGRESS NOTES
[facility]  Heart Transplant  Progress Note    Patient Name: Lv Crocker  MRN: [id number]  Admission Date: 5/29/2018  Hospital Length of Stay: 2 days  Attending Physician: Heriberto Rosa MD  Primary Care Provider: Philippe Mohr MD  Principal Problem:Fever    Subjective:     Interval History:     Afebrile . Sleep well with CPAP. BP reported to be labile this morning. Had 1 PRBC due to anemia, Hb 6.6. Broch and BAL deferred due to hypotension. Will be attempted tomorrow.     Continuous Infusions:   sodium chloride 0.9%       Scheduled Meds:   aspirin  81 mg Oral Daily    ceFEPime (MAXIPIME) IVPB  1 g Intravenous Q12H    cetirizine  5 mg Oral Daily    enoxaparin  30 mg Subcutaneous Daily    escitalopram oxalate  20 mg Oral Daily    gabapentin  100 mg Oral TID    insulin detemir U-100  8 Units Subcutaneous BID    levalbuterol  0.63 mg Nebulization Q4H WAKE    levothyroxine  137 mcg Oral Before breakfast    magnesium oxide  400 mg Oral Daily    midodrine  10 mg Oral TID WM    mycophenolate  250 mg Oral BID    pantoprazole  40 mg Oral Daily    polyethylene glycol  17 g Oral BID    pravastatin  40 mg Oral Daily    predniSONE  2.5 mg Oral Daily    QUEtiapine  50 mg Oral QHS    tacrolimus  5 mg Oral BID     PRN Meds:sodium chloride, acetaminophen, ALPRAZolam, benzonatate, dextrose 50%, dextrose 50%, glucagon (human recombinant), glucose, glucose, insulin aspart U-100, magnesium sulfate IVPB, midazolam, ondansetron, oxyCODONE, sodium phosphate IVPB, sodium phosphate IVPB, sodium phosphate IVPB    Review of patient's allergies indicates:   Allergen Reactions    No known drug allergies      Objective:     Vital Signs (Most Recent):  Temp: 98 °F (36.7 °C) (05/31/18 1130)  Pulse: 96 (05/31/18 1505)  Resp: 14 (05/31/18 1505)  BP: 104/67 (05/31/18 1400)  SpO2: 98 % (05/31/18 1505) Vital Signs (24h Range):  Temp:  [97.7 °F (36.5 °C)-98.6 °F (37 °C)] 98 °F (36.7 °C)  Pulse:   [] 96  Resp:  [11-28] 14  SpO2:  [86 %-100 %] 98 %  BP: ()/(0-67) 104/67     Patient Vitals for the past 72 hrs (Last 3 readings):   Weight   05/29/18 2141 73.7 kg (162 lb 7.7 oz)   05/29/18 0152 79.9 kg (176 lb 2.4 oz)     Body mass index is 25.45 kg/m².      Intake/Output Summary (Last 24 hours) at 05/31/18 1522  Last data filed at 05/31/18 1400   Gross per 24 hour   Intake             1435 ml   Output                0 ml   Net             1435 ml       Hemodynamic Parameters:  CVP:  [12 mmHg-13 mmHg] 12 mmHg    Telemetry: reviewed    Physical Exam     Constitutional: He is oriented to person, place, and time.   Mildly uncomfortable on bed this morning.  Eyes: Pupils are equal, round, and reactive to light.   Neck: No JVD present.   Cardiovascular: Normal rate and regular rhythm.    No murmur heard.  Tachycardia present   Pulmonary/Chest: Effort normal and breath sounds normal.   Abdominal: Soft. Bowel sounds are normal.   No fluid wave   Musculoskeletal: He exhibits no edema or tenderness.   Neurological: He is alert and oriented to person, place, and time.   Skin: Skin is warm and dry. Dry gangrene to toes bilaterally. Left great toe gangrene improved and less tender. No drainage  Psychiatric: mood and affect not evaluated but grossly normal  Nursing note reviewed.    Significant Labs:  CBC:    Recent Labs  Lab 05/29/18  0303 05/30/18  0400 05/31/18  0300   WBC 7.52 5.43 4.63   RBC 2.59* 2.39* 2.21*   HGB 7.8* 7.3* 6.6*   HCT 26.0* 23.8* 22.2*    185 175   * 100* 101*   MCH 30.1 30.5 29.9   MCHC 30.0* 30.7* 29.7*     BNP:    Recent Labs  Lab 05/29/18  0303   BNP 1,228*     CMP:    Recent Labs  Lab 05/29/18  0303 05/30/18  0400 05/31/18  0300 05/31/18  1345    197*  197* 237* 83   CALCIUM 9.0 8.3*  8.3* 8.4* 6.5*   ALBUMIN 2.5* 2.3*  2.3* 2.1* 1.6*   PROT 6.2 5.9* 5.4*  --     139  139 136 138   K 3.8 4.0  4.0 4.2 3.1*   CO2 25 24  24 24 18*    104  104 103 113*    BUN 28* 14  14 26* 24*   CREATININE 4.2* 2.2*  2.2* 3.0* 2.3*   ALKPHOS 151* 147* 136*  --    ALT 12 13 11  --    AST 20 21 15  --    BILITOT 0.5 0.5 0.5  --       Coagulation:   No results for input(s): PT, INR, APTT in the last 168 hours.  LDH:    Recent Labs  Lab 05/31/18  0800        Microbiology:  Microbiology Results (last 7 days)     Procedure Component Value Units Date/Time    Cryptococcal antigen [[id number]] Collected:  05/30/18 1222    Order Status:  Completed Specimen:  Blood from Blood Updated:  05/31/18 1228     Cryptococcal Ag, Blood Negative    Respiratory Viral Panel by PCR [facility]; Nasal Swab [[id number]] Collected:  05/29/18 2333    Order Status:  Completed Specimen:  Respiratory Updated:  05/31/18 1042     Respiratory Virus Panel, source Nasal Swab     RVP - Adenovirus Not Detected     Comment: Detects Serotypes B and E. Detection of Serotype C may   be limited. If Adenovirus infection is suspected and a   Not Detected result is returned the sample should be   re-tested for Adenovirus using an independent method  (e.g. [employer] Adenovirus Quantitative Real-Time  PCR test.          Enterovirus Not Detected     Comment: Cross-reactivity has been observed between certain Rhinovirus  strains and the Enterovirus assay.          Human Bocavirus Not Detected     Human Coronavirus Not Detected     Comment: The Human Coronavirus assay detects Human coronavirus types  229E, OC43,NL63 and HKU1.          RVP - Human Metapneumovirus (hMPV) Not Detected     RVP - Influenza A Not Detected     Influenza A - U2N1-05 Not Detected     RVP - Influenza B Not Detected     Parainfluenza Not Detected     Respiratory Syncytial VirusVirus (RSV) A Not Detected     Comment: The Respiratory Syncytial Viral assay detects types A and B,  however it does not distinguish between the two.          RVP - Rhinovirus Not Detected     Comment: Cross-Reactivity has been observed between certain   Rhinovirus strains  and the Enterovirus assay.  Target Enriched Mulitplex Polymerase Chain Reaction (TEM-PCR)  allows for the detection of multiple pathogens out of a single  reaction.  This test was developed and its performance   characteristics determined by [employer].  It has not   been cleared or approved by the U.S.Food and Drug Administration.  Results should be used in conjunction with clinical findings,   and should not form the sole basis for a diagnosis or treatment  decision.  TEM-PCR is a licensed technology of [employer].         Narrative:       Receiving Lab:->[facility]    Blood culture #2 **CANNOT BE ORDERED STAT** [[id number]] Collected:  05/29/18 0319    Order Status:  Completed Specimen:  Blood from Peripheral, Hand, Left Updated:  05/31/18 0612     Blood Culture, Routine No Growth to date     Blood Culture, Routine No Growth to date     Blood Culture, Routine No Growth to date    Blood culture #1 **CANNOT BE ORDERED STAT** [[id number]] Collected:  05/29/18 0303    Order Status:  Completed Specimen:  Blood from Peripheral, Wrist, Right Updated:  05/31/18 0612     Blood Culture, Routine No Growth to date     Blood Culture, Routine No Growth to date     Blood Culture, Routine No Growth to date    Culture, Respiratory with Gram Stain [[id number]]     Order Status:  No result Specimen:  Respiratory from Sputum     Respiratory Viral Panel by PCR [facility]; Sputum [[id number]]     Order Status:  Canceled Specimen:  Respiratory           I have reviewed all pertinent labs within the past 24 hours.      Estimated Creatinine Clearance: 38.3 mL/min (A) (based on SCr of 2.3 mg/dL (H)).    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 She denies him having any SOB, chest pain, diarrhea, nausea, vomiting. He doesnot make any urine since he has been non HD. Fever was noted to be as high as 101.6.She also notes that he has been coughing since being in ER but not before that.No SOB with PT at rehab.No dizziness or syncope. Wife reports he got an extra HD on Thursday because nephrology wanted to remove extra fluid.He has had a good appetite and tolerating PO and per wife has been working with PT and getting stronger. He has finished his abx course from last visit already.    * Fever    - Documented on Day two of stay (tmax 102.4)  - afebrile for the last 22 hrs  - Blood Cultures and resp panel continues to be  Negative including RSV  - CT scan + for congestion but equivocal for pneumonia. Present of moderate R- pleural effusion  - No leukocytosis but immunocompromised   - patient is anuric  - Pulmonary deferred Bronch due to hypotension. Will re- attempt tomorrow if BP more stable (discussed with Dr. García- Pulm)  - continue vanco and cefepime. Trough 19 today.   - Will hold vanco tonight and recheck trough  tomorrow.         Physical debility    - secondary to prolonged illness  - PT/OT in patient and d/c back to rehab once cleared          Heart transplanted    -S/p OHT 2014  -Continue prednisone and cellcept and tacrolimus  -Tacro level 5.6 yesterday  - increased dose to 5/5 BID from today. Will continue to monitor and adjust as needed        Depression    - continue current at home depression regimen.  - will consider psych if necessary          Gangrene    - Dry gangrene developed previous admission with pressor use  - Will continue with betadine rx as current.   - Podiatry on board  - Foot XR and dopplers shows no changes concerning for  infection          ESRD (end stage renal disease)    - on HD M, W, F  - BNP elevated > 1200 on admission  - s/p 2L UF yesterday night  - continue HD as scheduled. Appreciate nephrology assistance        Pleural effusion    Persistent right sided pleural effusion  Ct chest show moderate amount.   Will monitor during stay  Bronch abd BAL tomorrow by pulmomary        Type 1 diabetes mellitus with stage 3 chronic kidney disease    Insulin sliding scale  -continue detemir  - endocrine consult        Hypothyroidism due to Hashimoto's thyroiditis    - continue synthroid at home dose  - TSH 12.4, T4 4.4   - subclinical hypothyroidism.         Anemia    - hb 8-9 at time of discharge.   - trending down after admission possibly from fq blood draws compounded by current/chronic infection.  - will check stool for occult  - haptoglobin normal.   - retic count 4.2 consistent with working BM  - Prior iron labs shows adequate iron  - Transfuse x1 unit and monitor. Fu on stool occult          Discussed plan of care with Dr. Marco Walsh MD  Heart Transplant  [facility]

## 2018-05-31 NOTE — PROGRESS NOTES
Wound care consulted for skin breakdown to sacrum, unstageable, left and right 1st 3 toes.  Podiatry has assessed the toes and orders written.    The bilateral buttocks have pressure injury stage 2 noted with blanchable red skin noted.  Plan of care discussed with patient/family who verbalized understanding.     05/31/18 1430       Pressure Injury 05/29/18 2045 Left Buttocks Stage 2   Date First Assessed/Time First Assessed: 05/29/18 2045   Pressure Injury Present on Admission: yes  Side: Left  Location: Buttocks  Staging: Stage 2   Wound Image    Staging 2   Dressing Appearance Open to air;No dressing   Drainage Amount None   Appearance Red;Moist   Tissue loss description Partial thickness   Red (%), Wound Tissue Color 100 %   Periwound Area Pale white;Redness  (blancheable)   Wound Edges Open   Wound Length (cm) 1 cm   Wound Width (cm) 1 cm   Wound Depth (cm) 0.2 cm   Wound Volume (cm^3) 0.2 cm^3       Pressure Injury 05/29/18 1945 Right Buttocks Stage 2   Date First Assessed/Time First Assessed: 05/29/18 1945   Pressure Injury Present on Admission: yes  Side: Right  Location: Buttocks  Staging: Stage 2   Wound Image (see above pic)   Staging 2   Dressing Appearance Open to air;No dressing   Drainage Amount None   Appearance Pink;Moist;Red   Tissue loss description Partial thickness   Red (%), Wound Tissue Color 100 %   Periwound Area Redness  (blancheable)   Wound Edges Irregular;Open   Wound Length (cm) 1 cm   Wound Width (cm) 1 cm   Wound Depth (cm) 0.1 cm   Wound Volume (cm^3) 0.1 cm^3   Skin Interventions   Device Skin Pressure Protection positioning supports utilized   Pressure Reduction Devices Specialty bed utilized;Pressure-redistributing mattress utilized;Heel offloading device utilized   Pressure Reduction Techniques frequent weight shift encouraged;weight shift assistance provided;pressure points protected;positioned off wounds   recommendations:  Triad to buttocks BID and prn cleansing skin  PAYTON Zhang  Jamaal CHRISTINA, [facility]  [phone]

## 2018-05-31 NOTE — PT/OT/SLP EVAL
"Occupational Therapy   Evaluation    Name: Lv Crocker  MRN: [id number]  Admitting Diagnosis:  Fever      Recommendations:     Discharge Recommendations: rehabilitation facility  Discharge Equipment Recommendations:   (TBD at next level of care)  Barriers to discharge:  Inaccessible home environment, Decreased caregiver support    History:     Occupational Profile:  **pt has been in rehab since 5/18, and had prolonged hospital stay prior to that  Living Environment: Pt lives with spouse and 15 y.o son in a H with 7STE and b/l wide handrails. Bathroom has a tub/shower combo.   Previous level of function: PTA, pt was independent with functional mobility within the house and ADLs, however wife reports pt was fatigued daily and would sleep a majority of the day. Pt rarely leaves the home and when he does, he utilizes the rollator for functional mobility.   Roles and Routines: Pt is a father and   Equipment Owned:   (rollator with community mobility)  Assistance upon Discharge: Pt's spouse is a full time nurse, however pt has good family support and can assist as needed.     Past Medical History:   Diagnosis Date    Anxiety     Arthritis     Ascites     Thought to be 2/2 heart tpx; liver bx 3/31/17 w/o significant fibrosis    Cardiomyopathy     Depression     Controlled "for the most part" on Lexipro    Encounter for blood transfusion     during transplant    GERD (gastroesophageal reflux disease)     Hashimoto's disease     History of CHF (congestive heart failure)     Previously EF 20% (prior to heart transplant); last EF 60%, PAP 41 as of 12/12/17; throught to be 2/2 genetics & DM1    History of coronary artery disease     H/o Coronary artery disease; resolved since heart transplant 2014    History of myocardial infarction     h/o MI x3 prior to heart transplant    HLD (hyperlipidemia) 6/11/2015    Hx of psychiatric care     Hypoglycemia unawareness in type 1 diabetes mellitus     " "Hypothyroid     Initially hyperthyroid 2/2 Hoshimoto's thyroiditis; now on levothyroxine 150 mcg qd    Pleural effusion due to another disorder     Thought to be 2/2 heart tpx; s/p PleuRx catheter placement and removal after 1-2 months    Psychiatric problem     Pulmonary hypertension assoc with unclear multi-factorial mechanisms 12/12/2017    PAP 41 (EF 60%) on ECHO 12/12/17    Syncope 6/30/2015    Type I diabetes mellitus, well controlled     Well controlled; Dx'd @7y/o; on N insulin 20U BID & Humalog 10U w/meals +SSI; Glu 89 11/9/17; A1c 7.2% 4/5/17    Unspecified essential hypertension 05/29/2014    Well controlled; Last /57 on 11/9/17       Past Surgical History:   Procedure Laterality Date    CARDIAC CATHETERIZATION      CARDIAC SURGERY      CHOLECYSTECTOMY      COLONOSCOPY N/A 3/13/2018    Procedure: COLONOSCOPY;  Surgeon: Tim Spencer MD;  Location: [facility] ([address] [place] [address]);  Service: Endoscopy;  Laterality: N/A;    CORONARY ANGIOPLASTY      FOOT SPLIT TRANSFER OF THE POSTERIOR TIBIALIS TENDON PROCEDURE      HEART TRANSPLANT  2014    KNEE SURGERY      PleuRx catheter placement  01/11/2018    Plan for removal after 1-2 months by Dr. Jmaes in cardiothoracic surgery    s/p oht  November 2014    stent placemnet         Subjective     Chief Complaint: "My brachial plexus got messed up during my transplant surgery."  Patient/Family stated goals: to bet better  Communicated with: RN prior to session.  Pain/Comfort:  · Pain Rating 1: 0/10  · Pain Rating Post-Intervention 1: 0/10    Patients cultural, spiritual, Voodoo conflicts given the current situation: none reported    Objective:     Patient found with: blood pressure cuff, telemetry, pulse ox (continuous), oxygen, peripheral IV (SC Marty catheter)    General Precautions: Standard, fall   Orthopedic Precautions:    Braces:       Occupational Performance:    Bed Mobility:    · Patient completed Rolling/Turning to Right with " moderate assistance  · Patient completed Scooting/Bridging with moderate assistance  · Patient completed Supine to Sit with moderate assistance    Functional Mobility/Transfers:  · Patient completed Sit <> Stand Transfer with maximal assistance  with  no assistive device   · Patient completed Bed <> Chair Transfer using Stand Pivot technique with maximal assistance with no assistive device  · Functional Mobility: NT    Activities of Daily Living:  · Grooming: moderate assistance    · UB Dressing: moderate assistance    · LB Dressing: maximal assistance    · Toileting: total assistance      Cognitive/Visual Perceptual:  Oriented to: Person, Place, Time and Situation  Follows Commands/attention: Follows multistep  commands  Communication: clear/fluent  Memory:  No Deficits noted  Safety awareness/insight to disability: intact  Coping skills/emotional control: Appropriate to situation    Physical Exam:  Pt is left-handed  Physical examination/scapula alignment:    -       No postural abnormalities identified  Sensation:    -       Intact  Upper Extremity Range of Motion:     -       Right Upper Extremity: WFL elbow, wrist and hand; minimal active shoulder ROM 2* brachial plexus and RC injury  -       Left Upper Extremity: WFL elbow, wrist and hand; AAROM WFL proximally  Upper Extremity Strength:    -       Right Upper Extremity: 3/5 elbow/wrist/hand; NT proximally  -       Left Upper Extremity: 3-/5 proximally; 4/5 elbow/wrist/hand   Strength:    -       Right Upper Extremity: Fair  -       Left Upper Extremity: Fair  Minimal edema throughout R UE    Patient left up in chair with all lines intact, call button in reach and RN notified    AMPA 6 Click:  AMPA Total Score: 10    Treatment & Education:  Pt ed on OT POC  Pt sat EOB with SBA<>CGA while engaged in ROM ex's   Pt completed stand pivot t/f with Max A  Education:    Assessment:     Lv Crocker is a 42 y.o. male with a medical diagnosis of Fever.   "Performance deficits affecting function are weakness, impaired functional mobilty, gait instability, impaired self care skills, impaired endurance, impaired balance, impaired sensation, decreased upper extremity function, decreased lower extremity function, decreased ROM, impaired cardiopulmonary response to activity.      Rehab Prognosis:  Fair; patient would benefit from acute skilled OT services to address these deficits and reach maximum level of function.         Clinical Decision Makin.  OT Mod:  "Pt evaluation falls under moderate complexity for evaluation coding due to identification of 3-5 performance deficits noted as stated above. Eval required Min/Mod assistance to complete on this date and detailed assessment(s) were utilized. Moreover, an expanded review of history and occupational profile obtained with additional review of cognitive, physical and psychosocial hx."     Plan:     Patient to be seen 4 x/week to address the above listed problems via therapeutic activities, therapeutic exercises, self-care/home management  · Plan of Care Expires: 18  · Plan of Care Reviewed with: patient    This Plan of care has been discussed with the patient who was involved in its development and understands and is in agreement with the identified goals and treatment plan    GOALS:    Occupational Therapy Goals        Problem: Occupational Therapy Goal    Goal Priority Disciplines Outcome Interventions   Occupational Therapy Goal     OT, PT/OT Ongoing (interventions implemented as appropriate)    Description:  Goals to be met by: 6/10/18     Patient will increase functional independence with ADLs by performing:    Feeding with Set-up Assistance.  UE Dressing with Minimal Assistance.  LE Dressing with Maximum Assistance.  Grooming while standing with Minimal Assistance.  Toileting from bedside commode with Moderate Assistance for hygiene and clothing management.   Toilet transfer to bedside commode with " Minimal Assistance.  Upper extremity exercise program x10 reps per handout, with assistance as needed.                      Time Tracking:     OT Date of Treatment: 05/31/18  OT Start Time: 0844  OT Stop Time: 0906  OT Total Time (min): 22 min    Billable Minutes:Evaluation 10  Therapeutic Exercise 8    AMADOU Murillo  5/31/2018

## 2018-05-31 NOTE — ASSESSMENT & PLAN NOTE
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  Patient denies any localizing signs or symptoms of infection.  Exam notable for new oxygen requirement, gangrenous toes, stage I left ischial wound. If patient with persistent fevers, may need to evaluate for possible fungal etiologies, sample pleural effusion.    Recommendations:  - cont vanc and cefepime for now, for treatment of presumed bacterial PNA/ HAP  - RVP (sputum) negative  - blood cx ngtd  - follow up  CMV PCR and fungal labs  - redose vancomycin based on levels and CRRT timing   - would cont to recommend bronchoscopy. Check cell count w dif, aspergillus, AFB stain and culture, bacterial cultures. Pt appears hemodynamically unstable given low BPs for bronch at this time.

## 2018-05-31 NOTE — PROGRESS NOTES
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 with recurrent pleural effusions/ascites requiring monthly thoracenteses/paracenteses (last paracentesis 5/15/18 3.5 L), VATS 1/11/18 with Pleur-X catheter (now removed), and recent admission for diarrhea and RSV complicated with respiratory failure requiring intubation/trach/PEG (both Trach/PEG removed 5/10) and CACHORRO on superimposed on CKD stage 4 secondary to prograft toxcity?/ischemic ATN (see last admission consult 3/11-5/18/18-discharge to rehab) now [facility]14[facility]) who is admitted from [facility] for fever 100-101.6 since Friday night. Denies PND, rhinorrhea, cough or shortness of breath. Wife reports doing well at Rehab up -transfers and parallel bars.  Eating well since appetite stimulator started at rehab. Had HD 4 days last week MWTF due to volume excess. Last HD yesterday 3 L removed. Denies L IJ site tenderness.     CT chest wo contrast showed patchy subsegmental opacities through out lungs, mod volume right sided pleural effusion with trace left side pleural fluid and persistent round RLL opacity, new mod t12 compression deformity, and small volume abdominal ascites. Lactate 0.9. Blood cultures collected in ED. Vancomycin started in ED. SBP , Tacy 111's,  % on 2 L NC.     Interval History:   NAEON, net positive 1L/24h, electrolytes stable this morning.  Planned for Bronch today.  He voices no complaints, primary team planning on PRBC transfusion.    Review of  patient's allergies indicates:   Allergen Reactions    No known drug allergies      Current Facility-Administered Medications   Medication Frequency    0.9%  NaCl infusion (for blood administration) Q24H PRN    acetaminophen tablet 650 mg Q6H PRN    ALPRAZolam tablet 0.5 mg Q6H PRN    aspirin EC tablet 81 mg Daily    benzonatate capsule 100 mg TID PRN    ceFEPIme injection 1 g Q12H    cetirizine tablet 5 mg Daily    dextrose 50% injection 12.5 g PRN    dextrose 50% injection 25 g PRN    enoxaparin injection 30 mg Daily    escitalopram oxalate tablet 20 mg Daily    gabapentin capsule 100 mg TID    glucagon (human recombinant) injection 1 mg PRN    glucose chewable tablet 16 g PRN    glucose chewable tablet 24 g PRN    insulin aspart U-100 pen 1-10 Units QID (AC + HS) PRN    insulin detemir U-100 pen 8 Units BID    levalbuterol nebulizer solution 0.63 mg Q4H WAKE    levothyroxine tablet 137 mcg Before breakfast    magnesium oxide tablet 400 mg Daily    midazolam (VERSED) 1 mg/mL injection 10 mg On Call Procedure    midodrine tablet 10 mg TID WM    mycophenolate capsule 250 mg BID    ondansetron disintegrating tablet 8 mg Q8H PRN    oxyCODONE immediate release tablet 5 mg Q6H PRN    pantoprazole EC tablet 40 mg Daily    polyethylene glycol packet 17 g BID    pravastatin tablet 40 mg Daily    predniSONE tablet 2.5 mg Daily    QUEtiapine tablet 50 mg QHS    tacrolimus capsule 5 mg BID       Objective:     Vital Signs (Most Recent):  Temp: 97.7 °F (36.5 °C) (05/31/18 1115)  Pulse: 99 (05/31/18 1115)  Resp: 15 (05/31/18 1115)  BP: (!) 82/60 (05/31/18 1115)  SpO2: 100 % (05/31/18 1115)  O2 Device (Oxygen Therapy): room air (05/31/18 1108) Vital Signs (24h Range):  Temp:  [97.7 °F (36.5 °C)-102.4 °F (39.1 °C)] 97.7 °F (36.5 °C)  Pulse:  [] 99  Resp:  [11-23] 15  SpO2:  [86 %-100 %] 100 %  BP: ()/(50-64) 82/60     Weight: 73.7 kg (162 lb 7.7 oz) (05/29/18 2621)  Body mass index is  25.45 kg/m².  Body surface area is 1.87 meters squared.    I/O last 3 completed shifts:  In: 3200 [P.O.:1190; I.V.:1760; IV Piggyback:250]  Out: 2549 [Other:6509]    Physical Exam   Constitutional: He is oriented to person, place, and time. He appears well-developed. He appears ill. No distress. Nasal cannula in place.   HENT:   Head: Normocephalic and atraumatic.   Right Ear: External ear normal.   Left Ear: External ear normal.   Eyes: Conjunctivae and EOM are normal. Right eye exhibits no discharge. Left eye exhibits no discharge.   Neck: Normal range of motion. Neck supple.   Cardiovascular: Regular rhythm.  Tachycardia present.    Pulmonary/Chest: Effort normal. No respiratory distress. He has no wheezes. He has rales.   Abdominal: Soft. Bowel sounds are normal. He exhibits no distension. There is no tenderness.   Musculoskeletal: He exhibits edema.   Neurological: He is alert and oriented to person, place, and time.   Skin: Skin is warm and dry. He is not diaphoretic.   -L IJ Permacath   -Right necrotic toes 1-3-similar to previous    Psychiatric: He has a normal mood and affect.       Significant Labs:  CBC:   Recent Labs  Lab 05/31/18  0300   WBC 4.63   RBC 2.21*   HGB 6.6*   HCT 22.2*      *   MCH 29.9   MCHC 29.7*     CMP:   Recent Labs  Lab 05/31/18  0300   *   CALCIUM 8.4*   ALBUMIN 2.1*   PROT 5.4*      K 4.2   CO2 24      BUN 26*   CREATININE 3.0*   ALKPHOS 136*   ALT 11   AST 15   BILITOT 0.5     Assessment/Plan:     ESRD (end stage renal disease)    CACHORRO on superimposed on CKD stage 4 secondary to prograft toxcity/ischemic ATN (see last admission consult 3/11-5/18/18-discharge to rehab) now dialysis dependent via [facility] (since 3/14/2018) who is admitted [place] [facility] for fever 100-101.6 since Friday night. Last HD yesterday 5/28 3 L removed.   -L IJ permacath no overt signs of tunnel or exit site infection.   -Blood cultures are pending. Antibiotics per  primary/Infectious Disease    Plan/recommendations:  -will restart SLED for metabolic clearance/volume management this afternoon after bronch.  -UF goal 350-400 cc/hr as tolerated.  Recommend transfusion with SLED today.            Juaquin Ibrahim NP  Nephrology  [facility]  Pager:  [phone]

## 2018-05-31 NOTE — ASSESSMENT & PLAN NOTE
CACHORRO on superimposed on CKD stage 4 secondary to prograft toxcity/ischemic ATN (see last admission consult 3/11-5/18/18-discharge to rehab) now dialysis dependent via [facility] (since 3/14/2018) who is admitted from [facility] for fever 100-101.6 since Friday night. Last HD yesterday 5/28 3 L removed.   -L IJ permacath no overt signs of tunnel or exit site infection.   -Blood cultures are pending. Antibiotics per primary/Infectious Disease    Plan/recommendations:  -will restart SLED for metabolic clearance/volume management this afternoon after bronch.  -UF goal 350-400 cc/hr as tolerated.  Recommend transfusion with SLED today.

## 2018-05-31 NOTE — PT/OT/SLP EVAL
Physical Therapy Evaluation    Patient Name:  Lv Crocker   MRN:  [id number]    Recommendations:     Discharge Recommendations:  IP Rehab  Discharge Equipment Recommendations:  (TBD closer to D/c date)   Barriers to discharge: Decreased caregiver support    Assessment:     Lv Crocker is a 42 y.o. male admitted with a medical diagnosis of Fever from IP rehab.  He presents with the following impairments/functional limitations:  weakness, impaired endurance, gait instability, impaired functional mobilty, impaired balance, decreased lower extremity function, decreased upper extremity function. Pt nate treatment well with compliance to treatment and ability to sit at EOB. Will cont with skilled PT services 5x/wk to increase strength, endurance, and reach highest level of function. Recommending IP rehab once medically stable. OHT 2014.    Rehab Prognosis:  good; patient would benefit from acute skilled PT services to address these deficits and reach maximum level of function.      Recent Surgery: * No surgery found *      Plan:     During this hospitalization, patient to be seen 5 x/week to address the above listed problems via gait training, therapeutic activities, therapeutic exercises  · Plan of Care Expires:  06/30/18   Plan of Care Reviewed with: patient    Subjective     Communicated with nurse prior to session.  Patient found supine in bed upon PT entry to room, agreeable to evaluation.      Chief Complaint: none  Patient comments/goals: to get better and go home.  Pain/Comfort:  · Pain Rating 1: 0/10    Patients cultural, spiritual, Temple conflicts given the current situation: none    Living Environment:  Pt lives with wife and 15 y.o. son in [facility] with 4 B[facility]. Pt has another son who is 20 y.o. Who visits often. Wife works full time. Pt was not working before hospital admission and wife has been doing most of the driving. Pt reports also having a sister-in-law and cousin nearby to help out  "part time.     Prior to admission, patients level of function was independent.  Patient has the following equipment:  .  DME owned (not currently used): rolling walker and single point cane.  Upon discharge, patient will have assistance from wife and other family members, part time.    Objective:     Patient found with: central line, blood pressure cuff, pulse ox (continuous), oxygen, telemetry, peripheral IV     General Precautions: Standard, fall   Orthopedic Precautions:    Braces:       Exams:  · Cognitive Exam:  Patient is oriented to Person, Place, Time and Situation.  · RLE ROM: WFL  · RLE Strength: 3-/5  · LLE ROM: WFL  · LLE Strength: 3-/5    Functional Mobility:  · Bed Mobility:     · Rolling Right: moderate assistance  ·   · Transfers:     · Sit to Stand:  First attempt unsuccessful due to pt kicking feet out. Second attempt maximal assistance from EOB with stand pivot transfer to chair with no AD.  ·   · Balance: able to hold static balance posture at EOB for 6 minutes during UE and LE exercises with occasional CGA. Pt used hands on bed for support.     AM-PAC 6 CLICK MOBILITY  Total Score:10       Therapeutic Activities and Exercises:   BLE AROM in sitting at EOB x10: hip flexion, LAQs, toe raises, heel raises    Pt needed occasional min assist with AROM for hip flexion and LAQs to reach full functional end range. Empty end feels due to pt reporting having "bad knees".    Patient left up in chair with all lines intact and call button in reach.    GOALS:    Physical Therapy Goals        Problem: Physical Therapy Goal    Goal Priority Disciplines Outcome Goal Variances Interventions   Physical Therapy Goal     PT/OT, PT      Description:  Goals to be met by: 18     Patient will increase functional independence with mobility by performin. Supine to sit with [place]. -not met  2. Sit to supine with [place]. -not met  3. Rolling to Left and Right with [place]. -not met  4. Sit to " "stand transfer with Supervision. -not met  5. Gait  x 200 feet with Stand-by Assistance with or without assisted device. -not met  6. Ascend/descend 4 stairs with bilateral Handrails Stand-by Assistance. -not met  7. Lower extremity exercise program x20 reps per handout, with independence. -not met                      History:     Past Medical History:   Diagnosis Date    Anxiety     Arthritis     Ascites     Thought to be 2/2 heart tpx; liver bx 3/31/17 w/o significant fibrosis    Cardiomyopathy     Depression     Controlled "for the most part" on Lexipro    Encounter for blood transfusion     during transplant    GERD (gastroesophageal reflux disease)     Hashimoto's disease     History of CHF (congestive heart failure)     Previously EF 20% (prior to heart transplant); last EF 60%, PAP 41 as of 12/12/17; throught to be 2/2 genetics & DM1    History of coronary artery disease     H/o Coronary artery disease; resolved since heart transplant 2014    History of myocardial infarction     h/o MI x3 prior to heart transplant    HLD (hyperlipidemia) 6/11/2015    Hx of psychiatric care     Hypoglycemia unawareness in type 1 diabetes mellitus     Hypothyroid     Initially hyperthyroid 2/2 Hoshimoto's thyroiditis; now on levothyroxine 150 mcg qd    Pleural effusion due to another disorder     Thought to be 2/2 heart tpx; s/p PleuRx catheter placement and removal after 1-2 months    Psychiatric problem     Pulmonary hypertension assoc with unclear multi-factorial mechanisms 12/12/2017    PAP 41 (EF 60%) on ECHO 12/12/17    Syncope 6/30/2015    Type I diabetes mellitus, well controlled     Well controlled; Dx'd @5y/o; on N insulin 20U BID & Humalog 10U w/meals +SSI; Glu 89 11/9/17; A1c 7.2% 4/5/17    Unspecified essential hypertension 05/29/2014    Well controlled; Last /57 on 11/9/17       Past Surgical History:   Procedure Laterality Date    CARDIAC CATHETERIZATION      CARDIAC SURGERY      " CHOLECYSTECTOMY      COLONOSCOPY N/A 3/13/2018    Procedure: COLONOSCOPY;  Surgeon: Tim Spencer MD;  Location: [facility] ([address] [place] [address]);  Service: Endoscopy;  Laterality: N/A;    CORONARY ANGIOPLASTY      FOOT SPLIT TRANSFER OF THE POSTERIOR TIBIALIS TENDON PROCEDURE      HEART TRANSPLANT  2014    KNEE SURGERY      PleuRx catheter placement  01/11/2018    Plan for removal after 1-2 months by Dr. aJmes in cardiothoracic surgery    s/p oht  November 2014    stent placemnet         Clinical Decision Making:     History  Co-morbidities and personal factors that may impact the plan of care Examination  Body Structures and Functions, activity limitations and participation restrictions that may impact the plan of care Clinical Presentation   Decision Making/ Complexity Score   Co-morbidities:   [] Time since onset of injury / illness / exacerbation  [] Status of current condition  []Patient's cognitive status and safety concerns    [] Multiple Medical Problems (see med hx)  Personal Factors:   [] Patient's age  [] Prior Level of function   [] Patient's home situation (environment and family support)  [] Patient's level of motivation  [] Expected progression of patient      HISTORY:(criteria)    [] 00742 - no personal factors/history    [] 82649 - has 1-2 personal factor/comorbidity     [] 71013 - has >3 personal factor/comorbidity     Body Regions:  [] Objective examination findings  [] Head     []  Neck  [] Trunk   [] Upper Extremity  [] Lower Extremity    Body Systems:  [] For communication ability, affect, cognition, language, and learning style: the assessment of the ability to make needs known, consciousness, orientation (person, place, and time), expected emotional /behavioral responses, and learning preferences (eg, learning barriers, education  needs)  [] For the neuromuscular system: a general assessment of gross coordinated movement (eg, balance, gait, locomotion, transfers, and transitions) and  motor function  (motor control and motor learning)  [] For the musculoskeletal system: the assessment of gross symmetry, gross range of motion, gross strength, height, and weight  [] For the integumentary system: the assessment of pliability(texture), presence of scar formation, skin color, and skin integrity  [] For cardiovascular/pulmonary system: the assessment of heart rate, respiratory rate, blood pressure, and edema     Activity limitations:    [] Patient's cognitive status and saf ety concerns          [] Status of current condition      [] Weight bearing restriction  [] Cardiopulmunary Restriction    Participation Restrictions:   [] Goals and goal agreement with the patient     [] Rehab potential (prognosis) and probable outcome      Examination of Body System: (criteria)    [] 23081 - addressing 1-2 elements    [] 13244 - addressing a total of 3 or more elements     [] 74004 -  Addressing a total of 4 or more elements         Clinical Presentation: (criteria)  Choose one     On examination of body system using standardized tests and measures patient presents with (CHOOSE ONE) elements from any of the following: body structures and functions, activity limitations, and/or participation restrictions.  Leading to a clinical presentation that is considered (CHOOSE ONE)                              Clinical Decision Making  (Eval Complexity):  Choose One     Time Tracking:     PT Received On: 05/31/18  PT Start Time: 0844     PT Stop Time: 0905  PT Total Time (min): 21 min     Billable Minutes: Evaluation 13 minutes and Therapeutic Exercise 8 minutes      CUONG Thomas  05/31/2018

## 2018-05-31 NOTE — SUBJECTIVE & OBJECTIVE
Interval History:   NAEON, net positive 1L/24h, electrolytes stable this morning.  Planned for Bronch today.  He voices no complaints, primary team planning on PRBC transfusion.    Review of patient's allergies indicates:   Allergen Reactions    No known drug allergies      Current Facility-Administered Medications   Medication Frequency    0.9%  NaCl infusion (for blood administration) Q24H PRN    acetaminophen tablet 650 mg Q6H PRN    ALPRAZolam tablet 0.5 mg Q6H PRN    aspirin EC tablet 81 mg Daily    benzonatate capsule 100 mg TID PRN    ceFEPIme injection 1 g Q12H    cetirizine tablet 5 mg Daily    dextrose 50% injection 12.5 g PRN    dextrose 50% injection 25 g PRN    enoxaparin injection 30 mg Daily    escitalopram oxalate tablet 20 mg Daily    gabapentin capsule 100 mg TID    glucagon (human recombinant) injection 1 mg PRN    glucose chewable tablet 16 g PRN    glucose chewable tablet 24 g PRN    insulin aspart U-100 pen 1-10 Units QID (AC + HS) PRN    insulin detemir U-100 pen 8 Units BID    levalbuterol nebulizer solution 0.63 mg Q4H WAKE    levothyroxine tablet 137 mcg Before breakfast    magnesium oxide tablet 400 mg Daily    midazolam (VERSED) 1 mg/mL injection 10 mg On Call Procedure    midodrine tablet 10 mg TID WM    mycophenolate capsule 250 mg BID    ondansetron disintegrating tablet 8 mg Q8H PRN    oxyCODONE immediate release tablet 5 mg Q6H PRN    pantoprazole EC tablet 40 mg Daily    polyethylene glycol packet 17 g BID    pravastatin tablet 40 mg Daily    predniSONE tablet 2.5 mg Daily    QUEtiapine tablet 50 mg QHS    tacrolimus capsule 5 mg BID       Objective:     Vital Signs (Most Recent):  Temp: 97.7 °F (36.5 °C) (05/31/18 1115)  Pulse: 99 (05/31/18 1115)  Resp: 15 (05/31/18 1115)  BP: (!) 82/60 (05/31/18 1115)  SpO2: 100 % (05/31/18 1115)  O2 Device (Oxygen Therapy): room air (05/31/18 1108) Vital Signs (24h Range):  Temp:  [97.7 °F (36.5 °C)-102.4 °F (39.1  °C)] 97.7 °F (36.5 °C)  Pulse:  [] 99  Resp:  [11-23] 15  SpO2:  [86 %-100 %] 100 %  BP: ()/(50-64) 82/60     Weight: 73.7 kg (162 lb 7.7 oz) (05/29/18 2141)  Body mass index is 25.45 kg/m².  Body surface area is 1.87 meters squared.    I/O last 3 completed shifts:  In: 3200 [P.O.:1190; I.V.:1760; IV Piggyback:250]  Out: 2549 [Other:2549]    Physical Exam   Constitutional: He is oriented to person, place, and time. He appears well-developed. He appears ill. No distress. Nasal cannula in place.   HENT:   Head: Normocephalic and atraumatic.   Right Ear: External ear normal.   Left Ear: External ear normal.   Eyes: Conjunctivae and EOM are normal. Right eye exhibits no discharge. Left eye exhibits no discharge.   Neck: Normal range of motion. Neck supple.   Cardiovascular: Regular rhythm.  Tachycardia present.    Pulmonary/Chest: Effort normal. No respiratory distress. He has no wheezes. He has rales.   Abdominal: Soft. Bowel sounds are normal. He exhibits no distension. There is no tenderness.   Musculoskeletal: He exhibits edema.   Neurological: He is alert and oriented to person, place, and time.   Skin: Skin is warm and dry. He is not diaphoretic.   -L IJ Permacath   -Right necrotic toes 1-3-similar to previous    Psychiatric: He has a normal mood and affect.       Significant Labs:  CBC:   Recent Labs  Lab 05/31/18  0300   WBC 4.63   RBC 2.21*   HGB 6.6*   HCT 22.2*      *   MCH 29.9   MCHC 29.7*     CMP:   Recent Labs  Lab 05/31/18  0300   *   CALCIUM 8.4*   ALBUMIN 2.1*   PROT 5.4*      K 4.2   CO2 24      BUN 26*   CREATININE 3.0*   ALKPHOS 136*   ALT 11   AST 15   BILITOT 0.5

## 2018-05-31 NOTE — ASSESSMENT & PLAN NOTE
-S/p OHT 2014  -Continue prednisone and cellcept and tacrolimus  -Tacro level 5.6 yesterday  - increased dose to 5/5 BID from today. Will continue to monitor and adjust as needed

## 2018-05-31 NOTE — ASSESSMENT & PLAN NOTE
- Documented on Day two of stay (tmax 102.4)  - afebrile for the last 22 hrs  - Blood Cultures and resp panel continues to be  Negative including RSV  - CT scan + for congestion but equivocal for pneumonia. Present of moderate R- pleural effusion  - No leukocytosis but immunocompromised   - patient is anuric  - Pulmonary deferred Bronch due to hypotension. Will re- attempt tomorrow if BP more stable  - continue vanco and cefepime. Trough 19 today.   - Will hold vanco tonight and recheck trough  tomorrow.

## 2018-05-31 NOTE — PLAN OF CARE
Problem: Patient Care Overview  Goal: Plan of Care Review  No acute events throughout night, CPAP overnight. Bronchoscopy possibly @ 1000. VS and assessment per flow sheet, patient progressing towards goals as tolerated, plan of care reviewed with Lv Crocker and family, all concerns addressed, will continue to monitor.

## 2018-05-31 NOTE — PLAN OF CARE
Problem: Occupational Therapy Goal  Goal: Occupational Therapy Goal  Goals to be met by: 6/10/18     Patient will increase functional independence with ADLs by performing:    Feeding with Set-up Assistance.  UE Dressing with Minimal Assistance.  LE Dressing with Maximum Assistance.  Grooming while standing with Minimal Assistance.  Toileting from bedside commode with Moderate Assistance for hygiene and clothing management.   Toilet transfer to bedside commode with Minimal Assistance.  Upper extremity exercise program x10 reps per handout, with assistance as needed.    Outcome: Ongoing (interventions implemented as appropriate)  OT eval completed, and above goals established. AMADOU Murillo  5/31/2018

## 2018-05-31 NOTE — PLAN OF CARE
Problem: Patient Care Overview  Goal: Plan of Care Review  Outcome: Ongoing (interventions implemented as appropriate)  No acute events throughout day. See vital signs and assessments in flowsheets. See below for updates on today's progress.     Pulmonary: 99% on RA and CPAP at night. Bronch scheduled for tomorrow to check for pneumonia    Cardiovascular: HR 90s-100, SR. MAP >60 after 1 unit PRBC given    Neurological: AAOx4. Flat affect    Gastrointestinal: Appetite good. No BM today. Stool sample needed to check for occult blood    Genitourinary: anuric. Plan for CRRT tonight    Endocrine: BG is being monitored ac/hs and before bed    Integumentary/Other: per flow sheet.    Plan of care communicated and reviewed with Lv Crocker and family. All concerns addressed. Will continue to monitor.

## 2018-05-31 NOTE — ASSESSMENT & PLAN NOTE
- hb 8-9 at time of discharge.   - trending down after admission possibly from fq blood draws compounded by current/chronic infection.  - will check stool for occult  - haptoglobin normal.   - retic count 4.2 consistent with working BM  - Prior iron labs shows adequate iron  - Transfuse x1 unit and monitor. Fu on stool occult

## 2018-06-01 PROBLEM — R19.7 DIARRHEA OF PRESUMED INFECTIOUS ORIGIN: Status: ACTIVE | Noted: 2018-01-01

## 2018-06-01 NOTE — ASSESSMENT & PLAN NOTE
-S/p OHT 2014  -Continue prednisone and cellcept and tacrolimus  -Tacro level 5.6 yesterday  - increased dose to 5/5 BID from yesterday.  -Will continue to monitor and adjust as needed

## 2018-06-01 NOTE — NURSING
Rounds Report: Attended interdisciplinary rounds this afternoon with the transplant team including SW, physicians, fellows,  mid-level providers, and transplant coordinators. Got bronch today so will await results. Repeating CMV PCR to determine if there is a reactivation of CMV.

## 2018-06-01 NOTE — PROGRESS NOTES
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 with recurrent pleural effusions/ascites requiring monthly thoracenteses/paracenteses (last paracentesis 5/15/18 3.5 L), VATS 1/11/18 with Pleur-X catheter (now removed), and recent admission for diarrhea and RSV complicated with respiratory failure requiring intubation/trach/PEG (both Trach/PEG removed 5/10) and CACHORRO on superimposed on CKD stage 4 secondary to prograft toxcity?/ischemic ATN (see last admission consult 3/11-5/18/18-discharge to rehab) now dialysis dependent via [facility] (since 3/14/2018) who is admitted from [facility] for fever 100-101.6 since Friday night. Denies PND, rhinorrhea, cough or shortness of breath. Wife reports doing well at Rehab up -transfers and parallel bars.  Eating well since appetite stimulator started at rehab. Had HD 4 days last week MWTF due to volume excess. Last HD yesterday 3 L removed. Denies L IJ site tenderness.     CT chest wo contrast showed patchy subsegmental opacities through out lungs, mod volume right sided pleural effusion with trace left side pleural fluid and persistent round RLL opacity, new mod t12 compression deformity, and small volume abdominal ascites. Lactate 0.9. Blood cultures collected in ED. Vancomycin started in ED. SBP , Tacy 111's,  % on 2 L NC.     Interval History:   SLED overnight, unable to obtain UF goal 2/2 low blood pressures, net even volume status in the past 24 hours.  Pending Bronch this morning.  He denies any complaints on  examination, oxygenating well on RA.  Electrolytes stable.    Review of patient's allergies indicates:   Allergen Reactions    No known drug allergies      Current Facility-Administered Medications   Medication Frequency    phenylephrine HCl in 0.9% NaCl 1 mg/10 mL (100 mcg/mL) syringe     0.9%  NaCl infusion (for blood administration) Q24H PRN    acetaminophen tablet 650 mg Q6H PRN    ALPRAZolam tablet 0.5 mg Q6H PRN    aspirin EC tablet 81 mg Daily    benzocaine 20 % oral spray Once PRN    benzonatate capsule 100 mg TID PRN    ceFEPIme injection 1 g Q12H    cetirizine tablet 5 mg Daily    dextrose 50% injection 12.5 g PRN    dextrose 50% injection 25 g PRN    enoxaparin injection 30 mg Daily    escitalopram oxalate tablet 20 mg Daily    fentaNYL injection 100 mcg PRN    gabapentin capsule 100 mg TID    glucagon (human recombinant) injection 1 mg PRN    glucose chewable tablet 16 g PRN    glucose chewable tablet 24 g PRN    insulin aspart U-100 pen 1-10 Units QID (AC + HS) PRN    insulin detemir U-100 pen 8 Units BID    levalbuterol nebulizer solution 0.63 mg Q4H WAKE    levothyroxine tablet 137 mcg Before breakfast    lidocaine HCL 10 mg/ml (1%) injection 20 mL Once    lidocaine HCL 2% jelly 2 mL Once    lidocaine HCL 2% jelly PRN    lidocaine HCL 20 mg/ml (2%) injection 20 mL Once    magnesium oxide tablet 400 mg Daily    midazolam (VERSED) 1 mg/mL injection 10 mg On Call Procedure    midazolam (VERSED) 1 mg/mL injection 4 mg PRN    midodrine tablet 10 mg TID WM    mycophenolate capsule 250 mg BID    ondansetron disintegrating tablet 8 mg Q8H PRN    oxyCODONE immediate release tablet 5 mg Q6H PRN    pantoprazole EC tablet 40 mg Daily    polyethylene glycol packet 17 g BID    potassium, sodium phosphates 280-160-250 mg packet 1 packet QID (AC & HS)    pravastatin tablet 40 mg Daily    predniSONE tablet 2.5 mg Daily    QUEtiapine tablet 50 mg QHS    sodium phosphate 39.99  mmol in dextrose 5 % 250 mL IVPB Once    tacrolimus capsule 5 mg BID       Objective:     Vital Signs (Most Recent):  Temp: 98.3 °F (36.8 °C) (06/01/18 0701)  Pulse: (!) 116 (06/01/18 1000)  Resp: 20 (06/01/18 1000)  BP: (!) 93/56 (06/01/18 1000)  SpO2: 98 % (06/01/18 1000)  O2 Device (Oxygen Therapy): room air (06/01/18 1000) Vital Signs (24h Range):  Temp:  [97.7 °F (36.5 °C)-98.4 °F (36.9 °C)] 98.3 °F (36.8 °C)  Pulse:  [] 116  Resp:  [13-34] 20  SpO2:  [93 %-100 %] 98 %  BP: ()/(0-67) 93/56     Weight: 73 kg (160 lb 15 oz) (06/01/18 0400)  Body mass index is 25.21 kg/m².  Body surface area is 1.86 meters squared.    I/O last 3 completed shifts:  In: 4150 [P.O.:940; I.V.:2610; Blood:350; IV Piggyback:250]  Out: 3745 [Other:3745]    Physical Exam   Constitutional: He is oriented to person, place, and time. He appears well-developed. He appears ill. No distress. Nasal cannula in place.   HENT:   Head: Normocephalic and atraumatic.   Right Ear: External ear normal.   Left Ear: External ear normal.   Eyes: Conjunctivae and EOM are normal. Right eye exhibits no discharge. Left eye exhibits no discharge.   Neck: Normal range of motion. Neck supple.   Cardiovascular: Regular rhythm.  Tachycardia present.    Pulmonary/Chest: Effort normal. No respiratory distress. He has no wheezes. He has rales.   Abdominal: Soft. Bowel sounds are normal. He exhibits no distension. There is no tenderness.   Musculoskeletal: He exhibits edema.   Neurological: He is alert and oriented to person, place, and time.   Skin: Skin is warm and dry. He is not diaphoretic.   -L IJ Permacath   -Right necrotic toes 1-3-similar to previous    Psychiatric: He has a normal mood and affect.       Significant Labs:  CBC:   Recent Labs  Lab 06/01/18  0322   WBC 4.51   RBC 2.62*   HGB 7.8*   HCT 25.2*      MCV 96   MCH 29.8   MCHC 31.0*     CMP:   Recent Labs  Lab 05/31/18  0300  06/01/18  0322   *  < > 88   CALCIUM 8.4*  < >  8.1*   ALBUMIN 2.1*  < > 2.1*   PROT 5.4*  --   --      < > 141   K 4.2  < > 4.4   CO2 24  < > 26     < > 108   BUN 26*  < > 7   CREATININE 3.0*  < > 1.0   ALKPHOS 136*  --   --    ALT 11  --   --    AST 15  --   --    BILITOT 0.5  --   --    < > = values in this interval not displayed.         Assessment/Plan:     ESRD (end stage renal disease)    CACHORRO on superimposed on CKD stage 4 secondary to prograft toxcity/ischemic ATN (see last admission consult 3/11-5/18/18-discharge to rehab) now dialysis dependent via [facility] (since 3/14/2018) who is admitted from [facility] for fever 100-101.6 since Friday night. Last HD yesterday 5/28 3 L removed.   -L IJ permacath no overt signs of tunnel or exit site infection.       Plan/recommendations:  -f/u with bronch.  Infectious vs. Edema?  -electrolytes stable, oxygenating well on RA.  -If remains HDS, possible HD treatment tomorrow.  -will start on DAQUAN Q MWF             Juaquin Ibrahim NP  Nephrology  [facility]  Pager:  [phone]

## 2018-06-01 NOTE — ASSESSMENT & PLAN NOTE
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  has hx of CMV reactivation and C diff, as well as hosp acquired PNA. Patient admitted with fevers and new O2 requirements, CT chest with patchy pulm opacities, moderate R sided pleural effusion     Recommendations:  - cont vancomycin and cefepime, for treatment of presumed bacterial PNA/ HAP  - RVP (sputum) negative  - blood cx ngtd  - CMV PCR <137  - f/u fungal labs- pending histo and blasto, 13BDglucan.  Aspergillus is negative  - vancomycin level of 19 on 5/31  - redose vanco based on level today  - bronchoscopy- pending today. Check cell count w dif, aspergillus, AFB stain and culture, bacterial cultures, RVP.

## 2018-06-01 NOTE — SUBJECTIVE & OBJECTIVE
Interval History:    Having diarrhea this morning without abd pain, nausea or vomiting. CMV PCR weakly positive. Blood cultures and Resp cultures negative.  Net negative 1800mL including CRRT removal. Bronch/BAL done today at bedside successfully.      Continuous Infusions:  Scheduled Meds:   aspirin  81 mg Oral Daily    ceFEPime (MAXIPIME) IVPB  1 g Intravenous Q12H    cetirizine  5 mg Oral Daily    enoxaparin  30 mg Subcutaneous Daily    epoetin jose miguel (PROCRIT) injection  10,000 Units Intravenous Every Mon, Wed, Fri    escitalopram oxalate  20 mg Oral Daily    gabapentin  100 mg Oral TID    insulin detemir U-100  8 Units Subcutaneous BID    levalbuterol  0.63 mg Nebulization Q4H WAKE    levothyroxine  137 mcg Oral Before breakfast    magnesium oxide  400 mg Oral Daily    midodrine  10 mg Oral TID WM    mycophenolate  250 mg Oral BID    pantoprazole  40 mg Oral Daily    polyethylene glycol  17 g Oral BID    potassium, sodium phosphates  1 packet Oral QID (AC & HS)    pravastatin  40 mg Oral Daily    predniSONE  2.5 mg Oral Daily    QUEtiapine  50 mg Oral QHS    tacrolimus  5 mg Oral BID     PRN Meds:sodium chloride, acetaminophen, ALPRAZolam, benzonatate, dextrose 50%, dextrose 50%, fentaNYL, glucagon (human recombinant), glucose, glucose, insulin aspart U-100, lidocaine HCL 2%, midazolam, midazolam, ondansetron, oxyCODONE    Review of patient's allergies indicates:   Allergen Reactions    No known drug allergies      Objective:     Vital Signs (Most Recent):  Temp: 98 °F (36.7 °C) (06/01/18 1100)  Pulse: (!) 112 (06/01/18 1400)  Resp: 19 (06/01/18 1400)  BP: 102/61 (06/01/18 1400)  SpO2: (!) 93 % (06/01/18 1400) Vital Signs (24h Range):  Temp:  [98 °F (36.7 °C)-98.4 °F (36.9 °C)] 98 °F (36.7 °C)  Pulse:  [] 112  Resp:  [13-34] 19  SpO2:  [93 %-100 %] 93 %  BP: ()/(50-65) 102/61     Patient Vitals for the past 72 hrs (Last 3 readings):   Weight   06/01/18 0400 73 kg (160 lb 15 oz)    05/29/18 2141 73.7 kg (162 lb 7.7 oz)     Body mass index is 25.21 kg/m².      Intake/Output Summary (Last 24 hours) at 06/01/18 1416  Last data filed at 06/01/18 1000   Gross per 24 hour   Intake             4105 ml   Output             4753 ml   Net             -648 ml       Hemodynamic Parameters:       Telemetry:     Physical Exam     Constitutional: He is oriented to person, place, and time.   Mildly uncomfortable on bed this morning.  Eyes: Pupils are equal, round, and reactive to light.   Neck: No JVD present.   Cardiovascular: Normal rate and regular rhythm.    No murmur heard.  Tachycardia present   Pulmonary/Chest: Effort normal and breath sounds normal.   Abdominal: Soft. Bowel sounds are normal.   No fluid wave   Musculoskeletal: He exhibits no edema or tenderness.   Neurological: He is alert and oriented to person, place, and time.   Skin: Skin is warm and dry. Dry gangrene to toes bilaterally. Left great toe gangrene improved and less tender. No drainage  Psychiatric: mood and affect not evaluated but grossly normal  Nursing note reviewed.    Significant Labs:  CBC:    Recent Labs  Lab 05/31/18  0300 05/31/18  1840 06/01/18  0322   WBC 4.63 3.82* 4.51   RBC 2.21* 2.64* 2.62*   HGB 6.6* 8.1* 7.8*   HCT 22.2* 25.4* 25.2*    165 169   * 96 96   MCH 29.9 30.7 29.8   MCHC 29.7* 31.9* 31.0*     BNP:    Recent Labs  Lab 05/29/18  0303   BNP 1,228*     CMP:    Recent Labs  Lab 05/29/18  0303 05/30/18  0400 05/31/18  0300 05/31/18  1345 05/31/18  2112 06/01/18  0322    197*  197* 237* 83 149* 88   CALCIUM 9.0 8.3*  8.3* 8.4* 6.5* 8.4* 8.1*   ALBUMIN 2.5* 2.3*  2.3* 2.1* 1.6* 2.1* 2.1*   PROT 6.2 5.9* 5.4*  --   --   --     139  139 136 138 140 141   K 3.8 4.0  4.0 4.2 3.1* 4.7 4.4   CO2 25 24  24 24 18* 24 26    104  104 103 113* 107 108   BUN 28* 14  14 26* 24* 17 7   CREATININE 4.2* 2.2*  2.2* 3.0* 2.3* 1.9* 1.0   ALKPHOS 151* 147* 136*  --   --   --    ALT 12 13 11   --   --   --    AST 20 21 15  --   --   --    BILITOT 0.5 0.5 0.5  --   --   --       Coagulation:   No results for input(s): PT, INR, APTT in the last 168 hours.  LDH:    Recent Labs  Lab 05/31/18  0800        Microbiology:  Microbiology Results (last 7 days)     Procedure Component Value Units Date/Time    Clostridium difficile EIA [[id number]]     Order Status:  No result Specimen:  Stool from Stool     Respiratory Viral Panel by PCR [facility]; Sputum (BAL) [[id number]] Collected:  06/01/18 1157    Order Status:  Sent Specimen:  Nasopharyngeal Updated:  06/01/18 1158    Gram stain [[id number]] Collected:  06/01/18 1157    Order Status:  Sent Specimen:  Body Fluid from Lung, [place] Updated:  06/01/18 1157    AFB Culture & Smear [[id number]] Collected:  06/01/18 1157    Order Status:  Sent Specimen:  Body Fluid from Lung, [place] Updated:  06/01/18 1157    Fungus culture [[id number]] Collected:  06/01/18 1157    Order Status:  Sent Specimen:  Body Fluid from Lung, [place] Updated:  06/01/18 1157    KOH prep [[id number]] Collected:  06/01/18 1157    Order Status:  Sent Specimen:  Body Fluid from Lung, [place] Updated:  06/01/18 1157    Culture, Respiratory with Gram Stain [[id number]] Collected:  06/01/18 1156    Order Status:  Sent Specimen:  Respiratory from BAL, LLL Updated:  06/01/18 1157    Culture, Respiratory [[id number]] Collected:  06/01/18 1156    Order Status:  Sent Specimen:  Respiratory from BAL, LLL Updated:  06/01/18 1157    Blood culture #2 **CANNOT BE ORDERED STAT** [[id number]] Collected:  05/29/18 0319    Order Status:  Completed Specimen:  Blood from Peripheral, Hand, Left Updated:  06/01/18 0612     Blood Culture, Routine No Growth to date     Blood Culture, Routine No Growth to date     Blood Culture, Routine No Growth to date     Blood Culture, Routine No Growth to date    Blood culture #1 **CANNOT BE ORDERED STAT** [[id number]] Collected:  05/29/18 0303    Order Status:  Completed Specimen:   Blood from Peripheral, Wrist, Right Updated:  06/01/18 0612     Blood Culture, Routine No Growth to date     Blood Culture, Routine No Growth to date     Blood Culture, Routine No Growth to date     Blood Culture, Routine No Growth to date    Cryptococcal antigen [[id number]] Collected:  05/30/18 1222    Order Status:  Completed Specimen:  Blood from Blood Updated:  05/31/18 1228     Cryptococcal Ag, Blood Negative    Respiratory Viral Panel by PCR [facility]; Nasal Swab [[id number]] Collected:  05/29/18 2333    Order Status:  Completed Specimen:  Respiratory Updated:  05/31/18 1042     Respiratory Virus Panel, source Nasal Swab     RVP - Adenovirus Not Detected     Comment: Detects Serotypes B and E. Detection of Serotype C may   be limited. If Adenovirus infection is suspected and a   Not Detected result is returned the sample should be   re-tested for Adenovirus using an independent method  (e.g. [facility] Adenovirus Quantitative Real-Time  PCR test.          Enterovirus Not Detected     Comment: Cross-reactivity has been observed between certain Rhinovirus  strains and the Enterovirus assay.          Human Bocavirus Not Detected     Human Coronavirus Not Detected     Comment: The Human Coronavirus assay detects Human coronavirus types  229E, OC43,NL63 and HKU1.          RVP - Human Metapneumovirus (hMPV) Not Detected     RVP - Influenza A Not Detected     Influenza A - M1M4-92 Not Detected     RVP - Influenza B Not Detected     Parainfluenza Not Detected     Respiratory Syncytial VirusVirus (RSV) A Not Detected     Comment: The Respiratory Syncytial Viral assay detects types A and B,  however it does not distinguish between the two.          RVP - Rhinovirus Not Detected     Comment: Cross-Reactivity has been observed between certain   Rhinovirus strains and the Enterovirus assay.  Target Enriched Mulitplex Polymerase Chain Reaction (TEM-PCR)  allows for the detection of multiple pathogens out of a  single  reaction.  This test was developed and its performance   characteristics determined by [employer].  It has not   been cleared or approved by the U.S.Food and Drug Administration.  Results should be used in conjunction with clinical findings,   and should not form the sole basis for a diagnosis or treatment  decision.  TEM-PCR is a licensed technology of [employer].         Narrative:       Receiving Lab:->[facility]    Respiratory Viral Panel by PCR [facility]; Sputum [[id number]]     Order Status:  Canceled Specimen:  Respiratory           I have reviewed all pertinent labs within the past 24 hours.    Estimated Creatinine Clearance: 88.1 mL/min (based on SCr of 1 mg/dL).    Diagnostic Results:  I have reviewed and interpreted all pertinent imaging results/findings within the past 24 hours.

## 2018-06-01 NOTE — PLAN OF CARE
Problem: Patient Care Overview  Goal: Plan of Care Review  Outcome: Ongoing (interventions implemented as appropriate)    Neuro: AAOx4. Flat affect. Able to follow all commands. Pupils are equal and reactive. Denies any numbness or tingling in all 4 extremities.       Pulmonary: Breath sounds are clear with diminished bases but no signs of distress. RA sats 100%     Cardiovascular: Sinus tach. BP improving.      Gastrointestinal: Cardiac diet. All 4 bowel sounds are active. Denies any abdominal pain or tenderness. 2 BMs overnight.     Genitourinary: Anuric. CRRT - SLED. UF goal of 350-400 but currently on 300.       Endocrine: CBG = 147. No insulin needed.     Integumentary/Other: Left and right big toe, right 2nd and third toe are black. Patient denies any numbness or tingling. Patient has 2 stage 2 pressure ulcers on coccyx. Applied foam mepilex dressing with triad cream.     Bed in low, locked position, call light within reach, side rails up x3. Bathed patient for bowel incontinence. Patient refused complete bath. Will continue to monitor.

## 2018-06-01 NOTE — PROGRESS NOTES
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   has hx of CMV reactivation and C diff, as well as hosp acquired PNA. Patient admitted with fevers and new O2 requirements, CT chest with patchy pulm opacities, moderate R sided pleural effusion     Recommendations:  - cont vancomycin and cefepime, for treatment of presumed bacterial PNA/ HAP  - RVP (sputum) negative  - blood cx ngtd  - CMV PCR <137. Would recommend weekly CMV PCR checks  - f/u fungal labs- pending histo and blasto, 13BDglucan.  Aspergillus is negative  - vancomycin level of 19 on 5/31  - redose vanco based on level today  - bronchoscopy- pending today. Check cell count w dif, aspergillus, AFB stain and culture, bacterial cultures, RVP.               Anticipated Disposition: bronch today    Thank you for your consult. I will follow-up with patient. Please contact us if you have any additional questions.    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 Patient reports low grade fevers that have not lasted over an hour. He denies cough, dyspnea, sore throat, rhinorrhea, abdominal pain, n/v, diarrhea. He reports the ulcers/gangrenous toes have improved since discharge. He also reports a sacral ulcer that wound care has been following and denies drainage from the area.     Interval History:     Remains afebrile.  Last fever on 5/30. BPs remain low, on midodrine. Received SLED/ CRRT. Bronchoscopy planned for today    Review of Systems   Constitutional: Negative for activity change, appetite change, chills, fatigue, fever and unexpected weight change.   HENT: Negative for congestion, postnasal drip, sinus pressure and sore throat.    Eyes: Negative for visual disturbance.   Respiratory: Negative for cough, shortness of breath and wheezing.    Cardiovascular: Negative for chest pain, palpitations and leg swelling.   Gastrointestinal: Negative for abdominal distention, abdominal pain, diarrhea, nausea and vomiting.   Genitourinary: Positive for decreased urine volume and enuresis.   Musculoskeletal: Negative for arthralgias and myalgias.   Skin: Negative for rash.   Allergic/Immunologic: Positive for immunocompromised state.   Neurological: Negative for dizziness, syncope, weakness, light-headedness and headaches.   Psychiatric/Behavioral:  Negative for confusion and decreased concentration.     Objective:     Vital Signs (Most Recent):  Temp: 98.3 °F (36.8 °C) (06/01/18 0701)  Pulse: (!) 116 (06/01/18 0900)  Resp: 18 (06/01/18 0900)  BP: (!) 88/54 (06/01/18 0900)  SpO2: 99 % (06/01/18 0900) Vital Signs (24h Range):  Temp:  [97.7 °F (36.5 °C)-98.4 °F (36.9 °C)] 98.3 °F (36.8 °C)  Pulse:  [] 116  Resp:  [13-34] 18  SpO2:  [93 %-100 %] 99 %  BP: ()/(0-67) 88/54     Weight: 73 kg (160 lb 15 oz)  Body mass index is 25.21 kg/m².    Estimated Creatinine Clearance: 88.1 mL/min (based on SCr of 1 mg/dL).    Physical Exam   Constitutional: He is oriented to person, place, and time. No distress.   Appears chronically ill   HENT:   Head: Normocephalic and atraumatic.   Right Ear: External ear normal.   Left Ear: External ear normal.   Eyes: EOM are normal. Pupils are equal, round, and reactive to light.   Neck: Normal range of motion. Neck supple.   Cardiovascular: Normal rate, regular rhythm, normal heart sounds and intact distal pulses.    No murmur heard.  Pulmonary/Chest: Effort normal and breath sounds normal. No respiratory distress. He has no wheezes.   Abdominal: Soft. Bowel sounds are normal. He exhibits no distension. There is no tenderness.   Musculoskeletal: Normal range of motion.   Neurological: He is alert and oriented to person, place, and time. No cranial nerve deficit.   Skin: Skin is warm and dry. No rash noted. He is not diaphoretic.   Psychiatric: He has a normal mood and affect.       Significant Labs:   Blood Culture:   Recent Labs  Lab 03/25/18  1606 05/05/18  0644 05/05/18  0812 05/29/18  0303 05/29/18  0319   LABBLOO No growth after 5 days. No growth after 5 days. No growth after 5 days. No Growth to date  No Growth to date  No Growth to date  No Growth to date No Growth to date  No Growth to date  No Growth to date  No Growth to date     CBC:   Recent Labs  Lab 05/31/18  0300 05/31/18  1840 06/01/18  0322   WBC 4.63  3.82* 4.51   HGB 6.6* 8.1* 7.8*   HCT 22.2* 25.4* 25.2*    165 169     CMP:   Recent Labs  Lab 05/31/18  0300 05/31/18  1345 05/31/18  2112 06/01/18  0322    138 140 141   K 4.2 3.1* 4.7 4.4    113* 107 108   CO2 24 18* 24 26   * 83 149* 88   BUN 26* 24* 17 7   CREATININE 3.0* 2.3* 1.9* 1.0   CALCIUM 8.4* 6.5* 8.4* 8.1*   PROT 5.4*  --   --   --    ALBUMIN 2.1* 1.6* 2.1* 2.1*   BILITOT 0.5  --   --   --    ALKPHOS 136*  --   --   --    AST 15  --   --   --    ALT 11  --   --   --    ANIONGAP 9 7* 9 7*   EGFRNONAA 24.1* 33.3* 41.9* >60.0     Respiratory Culture:   Recent Labs  Lab 03/13/18  1701 03/14/18  1137 03/15/18  1541 03/25/18  1534 04/26/18  1329   GSRESP >10epis/lfp and <than many WBC's  Predominance of oropharyngeal hank. Please recollect. No WBC's  No organisms seen <10 epithelial cells per low power field.  Rare WBC's   No organisms seen <10 epithelial cells per low power field.  Rare WBC's  Rare Gram positive cocci <10 epithelial cells per low power field.  No WBC's  Rare Gram negative rods   RESPIRATORYC Specimen inadequate - culture not performed No growth No growth Normal respiratory hank Normal respiratory hank

## 2018-06-01 NOTE — PROCEDURES
Pulmonary / Critical Care Medicine  Bronchoscopy Procedure Note      Date of Operation:    06/01/2018    Pre-op Diagnosis:    Recurrent pneumonia in an immunocompromised host    Post-op Diagnosis:    Same    Fellow:    Gautam Bateman MD    Attending:   Dr. Bowie    Anesthesia:    Versed 2 mg (in divided doses)   Fentanyl 50 mcg (in divided doses)   2% lidocaine jelly   6 cc 2% lidocaine (applied topically onto cords)   4 cc 1% lidocaine (applied topically into the airways)    Operation:   Flexible fiberoptic bronchoscopy, diagnostic bronchoalveolar lavage of the lingula    Specimen:   BAL lingula x 2   58 cc (1/3 sent for cytology and 2/3 sent to microbiology)    Estimated Blood Loss:    None    Indications:   The patient is a 42 year old gentleman who underwent a cardiac transplant in 2014 and is chronically immunosuppressed, who has been admitted several times over the past 6 months for recurrent pneumonia.    Consent:   The risks, benefits, complications, treatment options and expected outcomes were discussed with the patient.  The possibilities of reaction to medication, pulmonary aspiration, pneumothorax, bleeding, failure to diagnose her condition, and creating a complication requiring transfusion or operation were discussed with the patient who freely signed the consent.      Description of Procedure:  The patient was seen in the ICU, identified as Lv Crocker, and the procedure verified as Flexible Fiberoptic Bronchoscopy.  A Time Out was conducted, and the above information confirmed.     After the induction of topical nasopharyngeal anesthesia, the patient was positioned supine, and the bronchoscope was passed through the left nare without difficulty. The vocal cords were visualized and  2% buffered lidocaine 6 ml was topically placed onto the cords. The cords were unremarkable in appearance with normal motion. The scope was then passed into the trachea.  1% buffered lidocaine 4 ml was used topically on  the shivani.  Careful inspection of the tracheal lumen was accomplished. The scope was sequentially passed into the left main and then left upper and lower bronchi and segmental bronchi.  2 sequential bronchoalveolar lavages were obtained from the anterior segment of the lingula.  The scope was then withdrawn and advanced into the right main bronchus and then into the RUL, RML, and RLL bronchi and segmental bronchi.  The scope was then withdrawn, and the patient noted to be in satisfactory condition.    Gautam Bateman MD  Pulmonary / Critical Care Fellow  06/01/2018  12:09 PM

## 2018-06-01 NOTE — ASSESSMENT & PLAN NOTE
Persistent right sided pleural effusion  Ct chest show moderate amount.   S/P bronch and lavage today (6/1/20180  Fu on results and pulmonary recomendation

## 2018-06-01 NOTE — ASSESSMENT & PLAN NOTE
CACHORRO on superimposed on CKD stage 4 secondary to prograft toxcity/ischemic ATN (see last admission consult 3/11-5/18/18-discharge to rehab) now dialysis dependent via [facility] (since 3/14/2018) who is admitted from [facility] for fever 100-101.6 since Friday night. Last HD yesterday 5/28 3 L removed.   -L IJ permacath no overt signs of tunnel or exit site infection.       Plan/recommendations:  -f/u with bronch.  Infectious vs. Edema?  -electrolytes stable, oxygenating well on RA.  -If remains HDS, possible HD treatment tomorrow.  -will start on DAQUAN Q MWF

## 2018-06-01 NOTE — PROGRESS NOTES
Pulmonary / Critical Care Medicine  Progress Note  S: No major issues overnight.  Doing relatively well this morning.  No respiratory complaints.  Hemodynamically stable and not requiring vasopressors.  Stable on room air.       O: VS: Temp:      [98 °F (36.7 °C)-98.4 °F (36.9 °C)]   Pulse:      []   Resp:       [13-34]   BP:           ()/(50-65)   SpO2:      [93 %-100 %]     I/O:   Intake 4105 ml   Output 4753 ml   Net -648 ml       PE well developed, well nourished, no distress  lips, mucosa, and tongue normal; teeth and gums normal and no throat erythema  conjunctivae/corneas clear. PERRL.  regular rate and rhythm, S1, S2 normal, no murmur  clear to auscultation bilaterally, normal respiratory effort and normal percussion bilaterally  soft, non-tender non-distended; bowel sounds normal  warm, well perfused and no cyanosis or edema, or clubbing    Lines Left IJ dialysis catheter; Day #58  PIV x 2; Day #3      Labs   WBC 4.51   RBC 2.62   HGB 7.8   HCT 25.2      MCV 96   MCH 29.8   MCHC 31.0      K 4.4      CO2 26   BUN 7   CREATININE 1.0   MG 2.1   ALBUMIN 2.1       Imaging CXR 06/01/2018 No new images this morning       Micro Blood Cx 5/29 5/29 NGTD   Sputum Cx 4/26 Normal hank       Medications Scheduled    aspirin  81 mg Daily    ceFEPim  1 g Q12H    cetirizine  5 mg Daily    enoxaparin  30 mg Daily    epoetin jose miguel  10,000 Units Mon, Wed, Fri    escitalopram oxalate  20 mg Daily    gabapentin  100 mg TID    insulin detemir U-100  8 Units BID    levalbuterol  0.63 mg Q4H WAKE    levothyroxine  137 mcg Before breakfast    magnesium oxide  400 mg Daily    midodrine  10 mg TID WM    mycophenolate  250 mg BID    pantoprazole  40 mg Daily    polyethylene glycol  17 g BID    potassium, sodium phosphates  1 packet QID (AC & HS)    pravastatin  40 mg Daily    predniSONE  2.5 mg Daily    QUEtiapine  50 mg QHS    tacrolimus  5 mg BID    vancomycin   1,000 mg Once          PRN   sodium chloride, acetaminophen, ALPRAZolam, benzonatate, dextrose 50%, dextrose 50%, fentaNYL, glucagon (human recombinant), glucose, glucose, insulin aspart U-100, lidocaine HCL 2%, midazolam, midazolam, ondansetron, oxyCODONE           A/P:  1. Progressive radiographic abnormalities in an immunocompromised host  2. Chronic left sided pleural disease  · Doing relatively well with current treatment approach.  · Broad DDx without any revealing objective data up to this point.  · Bronch / BAL this morning.  Hopefully results from cytology/micro will provide some insight into his condition.  · Otherwise no new recommendations.    Gautam Bateman MD  [employer] Pulmonary / Critical Care Fellow  [phone]  06/01/2018  2:53 PM\

## 2018-06-01 NOTE — EICU
Called to bedside for bronchoscopy with lavage to (L).  Consent confirmed with patient.  Time out completed using proper patient identifiers.  Physician verification completed.  1140 - versed 1 mg given IVP  1143 - fentanyl 25 mcg given IVP  1146 - versed 1 mg given IVP  1147 - fentanyl 25 mcg given IVP  1149 - scope introduced per MD Caceres via (L) nare under direct supervision of MD Bowie.  1155 - scope removed.    Pt tolerated procedure well.

## 2018-06-01 NOTE — SUBJECTIVE & OBJECTIVE
Interval History:     Remains afebrile.  Last fever on 5/30. BPs remain low, on midodrine. Received SLED/ CRRT. Bronchoscopy planned for today    Review of Systems   Constitutional: Negative for activity change, appetite change, chills, fatigue, fever and unexpected weight change.   HENT: Negative for congestion, postnasal drip, sinus pressure and sore throat.    Eyes: Negative for visual disturbance.   Respiratory: Negative for cough, shortness of breath and wheezing.    Cardiovascular: Negative for chest pain, palpitations and leg swelling.   Gastrointestinal: Negative for abdominal distention, abdominal pain, diarrhea, nausea and vomiting.   Genitourinary: Positive for decreased urine volume and enuresis.   Musculoskeletal: Negative for arthralgias and myalgias.   Skin: Negative for rash.   Allergic/Immunologic: Positive for immunocompromised state.   Neurological: Negative for dizziness, syncope, weakness, light-headedness and headaches.   Psychiatric/Behavioral: Negative for confusion and decreased concentration.     Objective:     Vital Signs (Most Recent):  Temp: 98.3 °F (36.8 °C) (06/01/18 0701)  Pulse: (!) 116 (06/01/18 0900)  Resp: 18 (06/01/18 0900)  BP: (!) 88/54 (06/01/18 0900)  SpO2: 99 % (06/01/18 0900) Vital Signs (24h Range):  Temp:  [97.7 °F (36.5 °C)-98.4 °F (36.9 °C)] 98.3 °F (36.8 °C)  Pulse:  [] 116  Resp:  [13-34] 18  SpO2:  [93 %-100 %] 99 %  BP: ()/(0-67) 88/54     Weight: 73 kg (160 lb 15 oz)  Body mass index is 25.21 kg/m².    Estimated Creatinine Clearance: 88.1 mL/min (based on SCr of 1 mg/dL).    Physical Exam   Constitutional: He is oriented to person, place, and time. No distress.   Appears chronically ill   HENT:   Head: Normocephalic and atraumatic.   Right Ear: External ear normal.   Left Ear: External ear normal.   Eyes: EOM are normal. Pupils are equal, round, and reactive to light.   Neck: Normal range of motion. Neck supple.   Cardiovascular: Normal rate, regular  rhythm, normal heart sounds and intact distal pulses.    No murmur heard.  Pulmonary/Chest: Effort normal and breath sounds normal. No respiratory distress. He has no wheezes.   Abdominal: Soft. Bowel sounds are normal. He exhibits no distension. There is no tenderness.   Musculoskeletal: Normal range of motion.   Neurological: He is alert and oriented to person, place, and time. No cranial nerve deficit.   Skin: Skin is warm and dry. No rash noted. He is not diaphoretic.   Psychiatric: He has a normal mood and affect.       Significant Labs:   Blood Culture:   Recent Labs  Lab 03/25/18  1606 05/05/18  0644 05/05/18  0812 05/29/18  0303 05/29/18  0319   LABBLOO No growth after 5 days. No growth after 5 days. No growth after 5 days. No Growth to date  No Growth to date  No Growth to date  No Growth to date No Growth to date  No Growth to date  No Growth to date  No Growth to date     CBC:   Recent Labs  Lab 05/31/18  0300 05/31/18  1840 06/01/18  0322   WBC 4.63 3.82* 4.51   HGB 6.6* 8.1* 7.8*   HCT 22.2* 25.4* 25.2*    165 169     CMP:   Recent Labs  Lab 05/31/18  0300 05/31/18  1345 05/31/18  2112 06/01/18  0322    138 140 141   K 4.2 3.1* 4.7 4.4    113* 107 108   CO2 24 18* 24 26   * 83 149* 88   BUN 26* 24* 17 7   CREATININE 3.0* 2.3* 1.9* 1.0   CALCIUM 8.4* 6.5* 8.4* 8.1*   PROT 5.4*  --   --   --    ALBUMIN 2.1* 1.6* 2.1* 2.1*   BILITOT 0.5  --   --   --    ALKPHOS 136*  --   --   --    AST 15  --   --   --    ALT 11  --   --   --    ANIONGAP 9 7* 9 7*   EGFRNONAA 24.1* 33.3* 41.9* >60.0     Respiratory Culture:   Recent Labs  Lab 03/13/18  1701 03/14/18  1137 03/15/18  1541 03/25/18  1534 04/26/18  1329   GSRESP >10epis/lfp and <than many WBC's  Predominance of oropharyngeal hank. Please recollect. No WBC's  No organisms seen <10 epithelial cells per low power field.  Rare WBC's   No organisms seen <10 epithelial cells per low power field.  Rare WBC's  Rare Gram positive  cocci <10 epithelial cells per low power field.  No WBC's  Rare Gram negative rods   RESPIRATORYC Specimen inadequate - culture not performed No growth No growth Normal respiratory hank Normal respiratory hank

## 2018-06-01 NOTE — PROGRESS NOTES
[facility]  Heart Transplant  Progress Note    Patient Name: Lv Crocker  MRN: [id number]  Admission Date: 5/29/2018  Hospital Length of Stay: 3 days  Attending Physician: Heriberto Rosa MD  Primary Care Provider: Philippe Mohr MD  Principal Problem:Fever    Subjective:     Interval History:    Having diarrhea this morning without abd pain, nausea or vomiting. CMV PCR weakly positive. Blood cultures and Resp cultures negative.  Net negative 1800mL including CRRT removal. Bronch/BAL done today at bedside successfully.      Continuous Infusions:  Scheduled Meds:   aspirin  81 mg Oral Daily    ceFEPime (MAXIPIME) IVPB  1 g Intravenous Q12H    cetirizine  5 mg Oral Daily    enoxaparin  30 mg Subcutaneous Daily    epoetin jose miguel (PROCRIT) injection  10,000 Units Intravenous Every Mon, Wed, Fri    escitalopram oxalate  20 mg Oral Daily    gabapentin  100 mg Oral TID    insulin detemir U-100  8 Units Subcutaneous BID    levalbuterol  0.63 mg Nebulization Q4H WAKE    levothyroxine  137 mcg Oral Before breakfast    magnesium oxide  400 mg Oral Daily    midodrine  10 mg Oral TID WM    mycophenolate  250 mg Oral BID    pantoprazole  40 mg Oral Daily    polyethylene glycol  17 g Oral BID    potassium, sodium phosphates  1 packet Oral QID (AC & HS)    pravastatin  40 mg Oral Daily    predniSONE  2.5 mg Oral Daily    QUEtiapine  50 mg Oral QHS    tacrolimus  5 mg Oral BID     PRN Meds:sodium chloride, acetaminophen, ALPRAZolam, benzonatate, dextrose 50%, dextrose 50%, fentaNYL, glucagon (human recombinant), glucose, glucose, insulin aspart U-100, lidocaine HCL 2%, midazolam, midazolam, ondansetron, oxyCODONE    Review of patient's allergies indicates:   Allergen Reactions    No known drug allergies      Objective:     Vital Signs (Most Recent):  Temp: 98 °F (36.7 °C) (06/01/18 1100)  Pulse: (!) 112 (06/01/18 1400)  Resp: 19 (06/01/18 1400)  BP: 102/61 (06/01/18 1400)  SpO2: (!)  93 % (06/01/18 1400) Vital Signs (24h Range):  Temp:  [98 °F (36.7 °C)-98.4 °F (36.9 °C)] 98 °F (36.7 °C)  Pulse:  [] 112  Resp:  [13-34] 19  SpO2:  [93 %-100 %] 93 %  BP: ()/(50-65) 102/61     Patient Vitals for the past 72 hrs (Last 3 readings):   Weight   06/01/18 0400 73 kg (160 lb 15 oz)   05/29/18 2141 73.7 kg (162 lb 7.7 oz)     Body mass index is 25.21 kg/m².      Intake/Output Summary (Last 24 hours) at 06/01/18 1416  Last data filed at 06/01/18 1000   Gross per 24 hour   Intake             4105 ml   Output             4753 ml   Net             -648 ml       Hemodynamic Parameters:       Telemetry:     Physical Exam     Constitutional: He is oriented to person, place, and time.   Mildly uncomfortable on bed this morning.  Eyes: Pupils are equal, round, and reactive to light.   Neck: No JVD present.   Cardiovascular: Normal rate and regular rhythm.    No murmur heard.  Tachycardia present   Pulmonary/Chest: Effort normal and breath sounds normal.   Abdominal: Soft. Bowel sounds are normal.   No fluid wave   Musculoskeletal: He exhibits no edema or tenderness.   Neurological: He is alert and oriented to person, place, and time.   Skin: Skin is warm and dry. Dry gangrene to toes bilaterally. Left great toe gangrene improved and less tender. No drainage  Psychiatric: mood and affect not evaluated but grossly normal  Nursing note reviewed.    Significant Labs:  CBC:    Recent Labs  Lab 05/31/18  0300 05/31/18  1840 06/01/18  0322   WBC 4.63 3.82* 4.51   RBC 2.21* 2.64* 2.62*   HGB 6.6* 8.1* 7.8*   HCT 22.2* 25.4* 25.2*    165 169   * 96 96   MCH 29.9 30.7 29.8   MCHC 29.7* 31.9* 31.0*     BNP:    Recent Labs  Lab 05/29/18  0303   BNP 1,228*     CMP:    Recent Labs  Lab 05/29/18  0303 05/30/18  0400 05/31/18  0300 05/31/18  1345 05/31/18  2112 06/01/18  0322    197*  197* 237* 83 149* 88   CALCIUM 9.0 8.3*  8.3* 8.4* 6.5* 8.4* 8.1*   ALBUMIN 2.5* 2.3*  2.3* 2.1* 1.6* 2.1* 2.1*    PROT 6.2 5.9* 5.4*  --   --   --     139  139 136 138 140 141   K 3.8 4.0  4.0 4.2 3.1* 4.7 4.4   CO2 25 24  24 24 18* 24 26    104  104 103 113* 107 108   BUN 28* 14  14 26* 24* 17 7   CREATININE 4.2* 2.2*  2.2* 3.0* 2.3* 1.9* 1.0   ALKPHOS 151* 147* 136*  --   --   --    ALT 12 13 11  --   --   --    AST 20 21 15  --   --   --    BILITOT 0.5 0.5 0.5  --   --   --       Coagulation:   No results for input(s): PT, INR, APTT in the last 168 hours.  LDH:    Recent Labs  Lab 05/31/18  0800        Microbiology:  Microbiology Results (last 7 days)     Procedure Component Value Units Date/Time    Clostridium difficile EIA [[id number]]     Order Status:  No result Specimen:  Stool from Stool     Respiratory Viral Panel by PCR [facility]; Sputum (BAL) [[id number]] Collected:  06/01/18 1157    Order Status:  Sent Specimen:  Nasopharyngeal Updated:  06/01/18 1158    Gram stain [[id number]] Collected:  06/01/18 1157    Order Status:  Sent Specimen:  Body Fluid from Lung, [place] Updated:  06/01/18 1157    AFB Culture & Smear [[id number]] Collected:  06/01/18 1157    Order Status:  Sent Specimen:  Body Fluid from Lung, [place] Updated:  06/01/18 1157    Fungus culture [[id number]] Collected:  06/01/18 1157    Order Status:  Sent Specimen:  Body Fluid from Lung, [place] Updated:  06/01/18 1157    KOH prep [[id number]] Collected:  06/01/18 1157    Order Status:  Sent Specimen:  Body Fluid from Lung, [place] Updated:  06/01/18 1157    Culture, Respiratory with Gram Stain [[id number]] Collected:  06/01/18 1156    Order Status:  Sent Specimen:  Respiratory from BAL, LLL Updated:  06/01/18 1157    Culture, Respiratory [[id number]] Collected:  06/01/18 1156    Order Status:  Sent Specimen:  Respiratory from BAL, LLL Updated:  06/01/18 1157    Blood culture #2 **CANNOT BE ORDERED STAT** [[id number]] Collected:  05/29/18 0319    Order Status:  Completed Specimen:  Blood from Peripheral, Hand, Left Updated:  06/01/18 0612      Blood Culture, Routine No Growth to date     Blood Culture, Routine No Growth to date     Blood Culture, Routine No Growth to date     Blood Culture, Routine No Growth to date    Blood culture #1 **CANNOT BE ORDERED STAT** [[id number]] Collected:  05/29/18 0303    Order Status:  Completed Specimen:  Blood from Peripheral, Wrist, Right Updated:  06/01/18 0612     Blood Culture, Routine No Growth to date     Blood Culture, Routine No Growth to date     Blood Culture, Routine No Growth to date     Blood Culture, Routine No Growth to date    Cryptococcal antigen [[id number]] Collected:  05/30/18 1222    Order Status:  Completed Specimen:  Blood from Blood Updated:  05/31/18 1228     Cryptococcal Ag, Blood Negative    Respiratory Viral Panel by PCR [facility]; Nasal Swab [[id number]] Collected:  05/29/18 2333    Order Status:  Completed Specimen:  Respiratory Updated:  05/31/18 1042     Respiratory Virus Panel, source Nasal Swab     RVP - Adenovirus Not Detected     Comment: Detects Serotypes B and E. Detection of Serotype C may   be limited. If Adenovirus infection is suspected and a   Not Detected result is returned the sample should be   re-tested for Adenovirus using an independent method  (e.g. [employer] Adenovirus Quantitative Real-Time  PCR test.          Enterovirus Not Detected     Comment: Cross-reactivity has been observed between certain Rhinovirus  strains and the Enterovirus assay.          Human Bocavirus Not Detected     Human Coronavirus Not Detected     Comment: The Human Coronavirus assay detects Human coronavirus types  229E, OC43,NL63 and HKU1.          RVP - Human Metapneumovirus (hMPV) Not Detected     RVP - Influenza A Not Detected     Influenza A - G3J2-80 Not Detected     RVP - Influenza B Not Detected     Parainfluenza Not Detected     Respiratory Syncytial VirusVirus (RSV) A Not Detected     Comment: The Respiratory Syncytial Viral assay detects types A and B,  however it does not  distinguish between the two.          RVP - Rhinovirus Not Detected     Comment: Cross-Reactivity has been observed between certain   Rhinovirus strains and the Enterovirus assay.  Target Enriched Mulitplex Polymerase Chain Reaction (TEM-PCR)  allows for the detection of multiple pathogens out of a single  reaction.  This test was developed and its performance   characteristics determined by [employer].  It has not   been cleared or approved by the U.S.Food and Drug Administration.  Results should be used in conjunction with clinical findings,   and should not form the sole basis for a diagnosis or treatment  decision.  TEM-PCR is a licensed technology of [employer].         Narrative:       Receiving Lab:->[facility]    Respiratory Viral Panel by PCR [facility]; Sputum [[id number]]     Order Status:  Canceled Specimen:  Respiratory           I have reviewed all pertinent labs within the past 24 hours.    Estimated Creatinine Clearance: 88.1 mL/min (based on SCr of 1 mg/dL).    Diagnostic Results:  I have reviewed and interpreted all pertinent imaging results/findings within the past 24 hours.         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 She denies him having any SOB, chest pain, diarrhea, nausea, vomiting. He doesnot make any urine since he has been non HD. Fever was noted to be as high as 101.6.She also notes that he has been coughing since being in ER but not before that.No SOB with PT at rehab.No dizziness or syncope. Wife reports he got an  extra HD on Thursday because nephrology wanted to remove extra fluid.He has had a good appetite and tolerating PO and per wife has been working with PT and getting stronger. He has finished his abx course from last visit already.    * Fever    - Documented on Day two of stay (tmax 102.4)  - afebrile for the last 36 hrs  - Blood Cultures and resp panel continues to be  Negative including RSV  - CMV weakly positive  - CT scan + for congestion but equivocal for pneumonia. Present of moderate R- pleural effusion  - No leukocytosis but immunocompromised   - patient is anuric  - S/P bronch and Lavage today. Fu on study findings  - continue vanco and cefepime. Trough today17   - Will give vanco x1g today and recheck trough on next HD session. Discussed with Pharm D (Anna)        Physical debility    - secondary to prolonged illness  - PT/OT in patient and d/c back to rehab once cleared          Heart transplanted    -S/p OHT 2014  -Continue prednisone and cellcept and tacrolimus  -Tacro level 5.6 yesterday  - increased dose to 5/5 BID from yesterday.  -Will continue to monitor and adjust as needed        Diarrhea of presumed infectious origin    - Started this morning. Afebrile but has hx of C. Diff.  - could be from current abx but need to r/o c-diff recurrent  - could also be CMV reactivation recurrence  - C- diff check, no rx for CMV until repeat check next week  -check stool WBC and monitor for now.        Depression    - continue current at home depression regimen.  - will consider psych if necessary          Gangrene    - Dry gangrene developed previous admission with pressor use  - Will continue with betadine rx as current.   - Podiatry on board  - Foot XR and dopplers shows no changes concerning for infection          ESRD (end stage renal disease)    - on HD M, W, F  - BNP elevated > 1200 on admission  - s/p 2L UF yesterday night  - continue HD as scheduled. Appreciate nephrology assistance        Pleural  effusion    Persistent right sided pleural effusion  Ct chest show moderate amount.   S/P bronch and lavage today (6/1/20180  Fu on results and pulmonary recomendation        Type 1 diabetes mellitus with stage 3 chronic kidney disease    Insulin sliding scale  -continue detemir  - endocrine consult        Hypothyroidism due to Hashimoto's thyroiditis    - continue synthroid at home dose  - TSH 12.4, T4 4.4   - subclinical hypothyroidism.         Anemia    - hb 8-9 at time of discharge.   - trending down after admission possibly from fq blood draws compounded by current/chronic infection.  - stool occult pending  - haptoglobin normal.   - retic count 4.2 consistent with working BM  - Prior iron labs shows adequate iron  - stable > 8 after transfusion yesterday          Discussed plan of care with Dr. Marco Walsh MD  Heart Transplant  [facility]

## 2018-06-01 NOTE — ASSESSMENT & PLAN NOTE
- Documented on Day two of stay (tmax 102.4)  - afebrile for the last 36 hrs  - Blood Cultures and resp panel continues to be  Negative including RSV  - CMV weakly positive  - CT scan + for congestion but equivocal for pneumonia. Present of moderate R- pleural effusion  - No leukocytosis but immunocompromised   - patient is anuric  - S/P bronch and Lavage today. Fu on study findings  - continue vanco and cefepime. Trough today17   - Will give vanco x1g today and recheck trough on next HD session. Discussed with Pharm D (Anna)

## 2018-06-01 NOTE — SUBJECTIVE & OBJECTIVE
Interval History:   SLED overnight, unable to obtain UF goal 2/2 low blood pressures, net even volume status in the past 24 hours.  Pending Bronch this morning.  He denies any complaints on examination, oxygenating well on RA.  Electrolytes stable.    Review of patient's allergies indicates:   Allergen Reactions    No known drug allergies      Current Facility-Administered Medications   Medication Frequency    phenylephrine HCl in 0.9% NaCl 1 mg/10 mL (100 mcg/mL) syringe     0.9%  NaCl infusion (for blood administration) Q24H PRN    acetaminophen tablet 650 mg Q6H PRN    ALPRAZolam tablet 0.5 mg Q6H PRN    aspirin EC tablet 81 mg Daily    benzocaine 20 % oral spray Once PRN    benzonatate capsule 100 mg TID PRN    ceFEPIme injection 1 g Q12H    cetirizine tablet 5 mg Daily    dextrose 50% injection 12.5 g PRN    dextrose 50% injection 25 g PRN    enoxaparin injection 30 mg Daily    escitalopram oxalate tablet 20 mg Daily    fentaNYL injection 100 mcg PRN    gabapentin capsule 100 mg TID    glucagon (human recombinant) injection 1 mg PRN    glucose chewable tablet 16 g PRN    glucose chewable tablet 24 g PRN    insulin aspart U-100 pen 1-10 Units QID (AC + HS) PRN    insulin detemir U-100 pen 8 Units BID    levalbuterol nebulizer solution 0.63 mg Q4H WAKE    levothyroxine tablet 137 mcg Before breakfast    lidocaine HCL 10 mg/ml (1%) injection 20 mL Once    lidocaine HCL 2% jelly 2 mL Once    lidocaine HCL 2% jelly PRN    lidocaine HCL 20 mg/ml (2%) injection 20 mL Once    magnesium oxide tablet 400 mg Daily    midazolam (VERSED) 1 mg/mL injection 10 mg On Call Procedure    midazolam (VERSED) 1 mg/mL injection 4 mg PRN    midodrine tablet 10 mg TID WM    mycophenolate capsule 250 mg BID    ondansetron disintegrating tablet 8 mg Q8H PRN    oxyCODONE immediate release tablet 5 mg Q6H PRN    pantoprazole EC tablet 40 mg Daily    polyethylene glycol packet 17 g BID    potassium,  sodium phosphates 280-160-250 mg packet 1 packet QID (AC & HS)    pravastatin tablet 40 mg Daily    predniSONE tablet 2.5 mg Daily    QUEtiapine tablet 50 mg QHS    sodium phosphate 39.99 mmol in dextrose 5 % 250 mL IVPB Once    tacrolimus capsule 5 mg BID       Objective:     Vital Signs (Most Recent):  Temp: 98.3 °F (36.8 °C) (06/01/18 0701)  Pulse: (!) 116 (06/01/18 1000)  Resp: 20 (06/01/18 1000)  BP: (!) 93/56 (06/01/18 1000)  SpO2: 98 % (06/01/18 1000)  O2 Device (Oxygen Therapy): room air (06/01/18 1000) Vital Signs (24h Range):  Temp:  [97.7 °F (36.5 °C)-98.4 °F (36.9 °C)] 98.3 °F (36.8 °C)  Pulse:  [] 116  Resp:  [13-34] 20  SpO2:  [93 %-100 %] 98 %  BP: ()/(0-67) 93/56     Weight: 73 kg (160 lb 15 oz) (06/01/18 0400)  Body mass index is 25.21 kg/m².  Body surface area is 1.86 meters squared.    I/O last 3 completed shifts:  In: 4150 [P.O.:940; I.V.:2610; Blood:350; IV Piggyback:250]  Out: 3745 [Other:3745]    Physical Exam   Constitutional: He is oriented to person, place, and time. He appears well-developed. He appears ill. No distress. Nasal cannula in place.   HENT:   Head: Normocephalic and atraumatic.   Right Ear: External ear normal.   Left Ear: External ear normal.   Eyes: Conjunctivae and EOM are normal. Right eye exhibits no discharge. Left eye exhibits no discharge.   Neck: Normal range of motion. Neck supple.   Cardiovascular: Regular rhythm.  Tachycardia present.    Pulmonary/Chest: Effort normal. No respiratory distress. He has no wheezes. He has rales.   Abdominal: Soft. Bowel sounds are normal. He exhibits no distension. There is no tenderness.   Musculoskeletal: He exhibits edema.   Neurological: He is alert and oriented to person, place, and time.   Skin: Skin is warm and dry. He is not diaphoretic.   -L IJ Permacath   -Right necrotic toes 1-3-similar to previous    Psychiatric: He has a normal mood and affect.       Significant Labs:  CBC:   Recent Labs  Lab  06/01/18 0322   WBC 4.51   RBC 2.62*   HGB 7.8*   HCT 25.2*      MCV 96   MCH 29.8   MCHC 31.0*     CMP:   Recent Labs  Lab 05/31/18 0300 06/01/18 0322   *  < > 88   CALCIUM 8.4*  < > 8.1*   ALBUMIN 2.1*  < > 2.1*   PROT 5.4*  --   --      < > 141   K 4.2  < > 4.4   CO2 24  < > 26     < > 108   BUN 26*  < > 7   CREATININE 3.0*  < > 1.0   ALKPHOS 136*  --   --    ALT 11  --   --    AST 15  --   --    BILITOT 0.5  --   --    < > = values in this interval not displayed.

## 2018-06-01 NOTE — ASSESSMENT & PLAN NOTE
- Started this morning. Afebrile but has hx of C. Diff.  - could be from current abx but need to r/o c-diff recurrent  - could also be CMV reactivation recurrence  - C- diff check, no rx for CMV until repeat check next week  -check stool WBC and monitor for now.

## 2018-06-01 NOTE — PLAN OF CARE
Problem: Patient Care Overview  Goal: Plan of Care Review  Outcome: Ongoing (interventions implemented as appropriate)  No acute events throughout day. See vital signs and assessments in flowsheets. See below for updates on today's progress.     Pulmonary: Bedside bronch done today. Samples sent. Patient on room air and O2 sats >94%.    Cardiovascular: -110. MAPs >65. Patient still getting midodrine TID.    Neurological: AAOx4.     Gastrointestinal: Patient has had 5 large liquid BMs today. C. Diff sample sent. Appetite has been good.    Genitourinary: Anuric. CRRT completed this morning. Plan for regular dialysis tomorrow.    Endocrine: BG is being monitored ac/hs and at bedtime.    Integumentary/Other: per flow sheet.    Plan of care communicated and reviewed with Lv Crocker and family. All concerns addressed. Will continue to monitor.

## 2018-06-02 NOTE — SUBJECTIVE & OBJECTIVE
Interval History:   Patient evaluated at bedside, has had significant bowel movements yesterday 10 total plus one episode of vomiting. Denies any shortness of breath.     Review of patient's allergies indicates:   Allergen Reactions    No known drug allergies      Current Facility-Administered Medications   Medication Frequency    0.9%  NaCl infusion (for blood administration) Q24H PRN    0.9%  NaCl infusion PRN    0.9%  NaCl infusion Once    acetaminophen tablet 650 mg Q6H PRN    ALPRAZolam tablet 0.5 mg Q6H PRN    aspirin EC tablet 81 mg Daily    benzonatate capsule 100 mg TID PRN    ceFEPIme injection 1 g Q12H    cetirizine tablet 5 mg Daily    dextrose 50% injection 12.5 g PRN    dextrose 50% injection 25 g PRN    enoxaparin injection 30 mg Daily    epoetin jose miguel injection 10,000 Units Every Mon, Wed, Fri    escitalopram oxalate tablet 20 mg Daily    fentaNYL injection 100 mcg PRN    gabapentin capsule 100 mg TID    glucagon (human recombinant) injection 1 mg PRN    glucose chewable tablet 16 g PRN    glucose chewable tablet 24 g PRN    insulin aspart U-100 pen 1-10 Units QID (AC + HS) PRN    insulin detemir U-100 pen 8 Units BID    levalbuterol nebulizer solution 0.63 mg Q4H WAKE    levothyroxine tablet 137 mcg Before breakfast    lidocaine HCL 2% jelly PRN    magnesium oxide tablet 400 mg BID    midazolam (VERSED) 1 mg/mL injection 10 mg On Call Procedure    midazolam (VERSED) 1 mg/mL injection 4 mg PRN    midodrine tablet 10 mg TID WM    mycophenolate capsule 250 mg BID    ondansetron disintegrating tablet 8 mg Q8H PRN    oxyCODONE immediate release tablet 5 mg Q6H PRN    pantoprazole EC tablet 40 mg Daily    polyethylene glycol packet 17 g BID    potassium, sodium phosphates 280-160-250 mg packet 1 packet QID (AC & HS)    pravastatin tablet 40 mg Daily    predniSONE tablet 2.5 mg Daily    promethazine (PHENERGAN) 12.5 mg in dextrose 5 % 50 mL IVPB Q6H PRN    QUEtiapine  tablet 50 mg QHS    tacrolimus capsule 5 mg BID       Objective:     Vital Signs (Most Recent):  Temp: 99.6 °F (37.6 °C) (06/02/18 0700)  Pulse: (!) 117 (06/02/18 0900)  Resp: (!) 23 (06/02/18 0900)  BP: (!) 89/53 (06/02/18 0900)  SpO2: (!) 92 % (06/02/18 0900)  O2 Device (Oxygen Therapy): room air (06/02/18 0900) Vital Signs (24h Range):  Temp:  [98 °F (36.7 °C)-99.6 °F (37.6 °C)] 99.6 °F (37.6 °C)  Pulse:  [109-122] 117  Resp:  [15-28] 23  SpO2:  [89 %-100 %] 92 %  BP: ()/(51-71) 89/53     Weight: 73.4 kg (161 lb 13.1 oz) (06/02/18 0400)  Body mass index is 25.34 kg/m².  Body surface area is 1.86 meters squared.    I/O last 3 completed shifts:  In: 3955 [P.O.:480; I.V.:2925; IV Piggyback:550]  Out: 4274 [Other:4274]    Physical Exam   Constitutional: He is oriented to person, place, and time. He appears well-developed. He appears ill. No distress. Nasal cannula in place.   HENT:   Head: Normocephalic and atraumatic.   Right Ear: External ear normal.   Left Ear: External ear normal.   Eyes: Conjunctivae and EOM are normal. Right eye exhibits no discharge. Left eye exhibits no discharge.   Neck: Normal range of motion. Neck supple.   Cardiovascular: Regular rhythm.  Tachycardia present.    Pulmonary/Chest: Effort normal. No respiratory distress. He has no wheezes. He has rales.   Abdominal: Soft. Bowel sounds are normal. He exhibits no distension. There is no tenderness.   Musculoskeletal: He exhibits edema.   Neurological: He is alert and oriented to person, place, and time.   Skin: Skin is warm and dry. He is not diaphoretic.   -L IJ Permacath   -Right necrotic toes 1-3-similar to previous    Psychiatric: He has a normal mood and affect.       Significant Labs:  ABGs: No results for input(s): PH, PCO2, HCO3, POCSATURATED, BE in the last 168 hours.  BMP:   Recent Labs  Lab 06/02/18 0441   GLU 75      CO2 24   BUN 10   CREATININE 1.6*   CALCIUM 8.6*   MG 1.6     CBC:   Recent Labs  Lab 06/02/18 0441    WBC 4.22   RBC 2.73*   HGB 8.2*   HCT 26.7*      MCV 98   MCH 30.0   MCHC 30.7*     CMP:   Recent Labs  Lab 06/02/18  0441   GLU 75   CALCIUM 8.6*   ALBUMIN 2.0*   PROT 5.5*      K 4.5   CO2 24      BUN 10   CREATININE 1.6*   ALKPHOS 137*   ALT 12   AST 16   BILITOT 0.6     All labs within the past 24 hours have been reviewed.

## 2018-06-02 NOTE — PLAN OF CARE
Problem: Patient Care Overview  Goal: Plan of Care Review  Outcome: Ongoing (interventions implemented as appropriate)  No acute events throughout day. See vital signs and assessments in flowsheets. See below for updates on today's progress.     Pulmonary: Patient was on room air majority of day. Now on 2L NC. Patient c/o SOB and sats were 84%.     Cardiovascular: HR remains ST at 119. Blood pressure /60-70s.     Neurological: AAOX4.     Gastrointestinal: BS present. Pt. C/o nausea. Administered zofran per MAR. 1 loose BM during shift.     Genitourinary: Anuric.    Endocrine: Hypoglycemic.    Integumentary/Other: Turn q 2 hrs r/t pressure ulcers on buttocks.     Infusions: No current infusions.     Patient progressing towards goals as tolerated, plan of care communicated and reviewed with Lv Crocker and family. All concerns addressed. Will continue to monitor.

## 2018-06-02 NOTE — PROGRESS NOTES
[facility]  Heart Transplant  Progress Note    Patient Name: Lv Crocker  MRN: [id number]  Admission Date: 5/29/2018  Hospital Length of Stay: 4 days  Attending Physician: Heriberto Rosa MD  Primary Care Provider: Philippe Mohr MD  Principal Problem:Fever    Subjective:     Interval History:    Continue to have diarrhea this morning but less frequent. Had mild nausea and x1 emesis too. C-diff negative per ID (tox negative, Marielena positive), Blood pressure labile last night requiring 250mL bolus. No current complain     Continuous Infusions:  Scheduled Meds:   sodium chloride 0.9%   Intravenous Once    aspirin  81 mg Oral Daily    ceFEPime (MAXIPIME) IVPB  1 g Intravenous Q12H    cetirizine  5 mg Oral Daily    enoxaparin  30 mg Subcutaneous Daily    epoetin jose miguel (PROCRIT) injection  10,000 Units Intravenous Every Mon, Wed, Fri    escitalopram oxalate  20 mg Oral Daily    gabapentin  100 mg Oral TID    insulin detemir U-100  8 Units Subcutaneous BID    levalbuterol  0.63 mg Nebulization Q4H WAKE    levothyroxine  137 mcg Oral Before breakfast    midodrine  10 mg Oral TID WM    mycophenolate  250 mg Oral BID    pantoprazole  40 mg Oral Daily    potassium, sodium phosphates  1 packet Oral QID (AC & HS)    pravastatin  40 mg Oral Daily    predniSONE  2.5 mg Oral Daily    QUEtiapine  50 mg Oral QHS    tacrolimus  5 mg Oral BID     PRN Meds:sodium chloride, sodium chloride 0.9%, acetaminophen, ALPRAZolam, benzonatate, dextrose 50%, dextrose 50%, fentaNYL, glucagon (human recombinant), glucose, glucose, insulin aspart U-100, lidocaine HCL 2%, midazolam, midazolam, ondansetron, oxyCODONE, promethazine (PHENERGAN) IVPB    Review of patient's allergies indicates:   Allergen Reactions    No known drug allergies      Objective:     Vital Signs (Most Recent):  Temp: 99.6 °F (37.6 °C) (06/02/18 0700)  Pulse: (!) 117 (06/02/18 0900)  Resp: (!) 23 (06/02/18 0900)  BP: (!) 89/53  (06/02/18 0900)  SpO2: (!) 92 % (06/02/18 0900) Vital Signs (24h Range):  Temp:  [98 °F (36.7 °C)-99.6 °F (37.6 °C)] 99.6 °F (37.6 °C)  Pulse:  [109-122] 117  Resp:  [15-28] 23  SpO2:  [89 %-100 %] 92 %  BP: ()/(51-71) 89/53     Patient Vitals for the past 72 hrs (Last 3 readings):   Weight   06/02/18 0400 73.4 kg (161 lb 13.1 oz)   06/01/18 0400 73 kg (160 lb 15 oz)     Body mass index is 25.34 kg/m².      Intake/Output Summary (Last 24 hours) at 06/02/18 0956  Last data filed at 06/02/18 0900   Gross per 24 hour   Intake              625 ml   Output                0 ml   Net              625 ml       Hemodynamic Parameters:       Telemetry: baseline sinus tach    Physical Exam   Constitutional: He is oriented to person, place, and time.    very comfortable on bed this morning  Eyes: Pupils are equal, round, and reactive to light.   Neck: No JVD present.   Cardiovascular: Normal rate and regular rhythm.    No murmur heard.  Tachycardia present   Pulmonary/Chest: Effort normal and breath sounds normal.   Abdominal: Soft. Bowel sounds are normal.   No fluid wave   Musculoskeletal: He exhibits no edema or tenderness.   Neurological: He is alert and oriented to person, place, and time.   Skin: Skin is warm and dry. Dry gangrene to toes bilaterally. Left great toe gangrene improved and less tender. No drainage  Psychiatric: mood and affect not evaluated but grossly normal  Nursing note reviewed.    Significant Labs:  CBC:    Recent Labs  Lab 05/31/18  1840 06/01/18  0322 06/02/18  0441   WBC 3.82* 4.51 4.22   RBC 2.64* 2.62* 2.73*   HGB 8.1* 7.8* 8.2*   HCT 25.4* 25.2* 26.7*    169 168   MCV 96 96 98   MCH 30.7 29.8 30.0   MCHC 31.9* 31.0* 30.7*     BNP:    Recent Labs  Lab 05/29/18  0303   BNP 1,228*     CMP:    Recent Labs  Lab 05/30/18  0400 05/31/18  0300  05/31/18  2112 06/01/18  0322 06/02/18  0441   *  197* 237*  < > 149* 88 75   CALCIUM 8.3*  8.3* 8.4*  < > 8.4* 8.1* 8.6*   ALBUMIN 2.3*   2.3* 2.1*  < > 2.1* 2.1* 2.0*   PROT 5.9* 5.4*  --   --   --  5.5*     139 136  < > 140 141 140   K 4.0  4.0 4.2  < > 4.7 4.4 4.5   CO2 24  24 24  < > 24 26 24     104 103  < > 107 108 108   BUN 14  14 26*  < > 17 7 10   CREATININE 2.2*  2.2* 3.0*  < > 1.9* 1.0 1.6*   ALKPHOS 147* 136*  --   --   --  137*   ALT 13 11  --   --   --  12   AST 21 15  --   --   --  16   BILITOT 0.5 0.5  --   --   --  0.6   < > = values in this interval not displayed.   Coagulation:   No results for input(s): PT, INR, APTT in the last 168 hours.  LDH:    Recent Labs  Lab 05/31/18  0800        Microbiology:  Microbiology Results (last 7 days)     Procedure Component Value Units Date/Time    Blood culture #2 **CANNOT BE ORDERED STAT** [[id number]] Collected:  05/29/18 0319    Order Status:  Completed Specimen:  Blood from Peripheral, Hand, Left Updated:  06/02/18 0612     Blood Culture, Routine No Growth to date     Blood Culture, Routine No Growth to date     Blood Culture, Routine No Growth to date     Blood Culture, Routine No Growth to date     Blood Culture, Routine No Growth to date    Blood culture #1 **CANNOT BE ORDERED STAT** [[id number]] Collected:  05/29/18 0303    Order Status:  Completed Specimen:  Blood from Peripheral, Wrist, Right Updated:  06/02/18 0612     Blood Culture, Routine No Growth to date     Blood Culture, Routine No Growth to date     Blood Culture, Routine No Growth to date     Blood Culture, Routine No Growth to date     Blood Culture, Routine No Growth to date    C Diff Toxin by PCR [[id number]] Collected:  06/01/18 1514    Order Status:  Completed Updated:  06/02/18 0404     C. diff PCR Negative    Clostridium difficile EIA [[id number]]  (Abnormal) Collected:  06/01/18 1514    Order Status:  Completed Specimen:  Stool from Stool Updated:  06/02/18 0319     C. diff Antigen Positive (A)     C difficile Toxins A+B, EIA Negative     Comment: Testing not recommended for children <24 months  old.       Culture, Respiratory [[id number]] Collected:  06/01/18 1156    Order Status:  Completed Specimen:  Respiratory from BAL, LLL Updated:  06/01/18 1816     Gram Stain (Respiratory) <10 epithelial cells per low power field.     Gram Stain (Respiratory) Rare WBC's     Gram Stain (Respiratory) No organisms seen    KOH prep [[id number]] Collected:  06/01/18 1157    Order Status:  Completed Specimen:  Body Fluid from Lung, [place] Updated:  06/01/18 1731     KOH Prep No yeast or fungal elements seen    Narrative:       Bronchial Wash    Respiratory Viral Panel by PCR [facility]; Sputum (BAL) [[id number]] Collected:  06/01/18 1157    Order Status:  Sent Specimen:  Nasopharyngeal Updated:  06/01/18 1458    Fungus culture [[id number]] Collected:  06/01/18 1157    Order Status:  Sent Specimen:  Body Fluid from Lung, [place] Updated:  06/01/18 1458    AFB Culture & Smear [[id number]] Collected:  06/01/18 1157    Order Status:  Sent Specimen:  Body Fluid from Lung, [place] Updated:  06/01/18 1457    Culture, Respiratory with Gram Stain [[id number]] Collected:  06/01/18 1156    Order Status:  Canceled Specimen:  Respiratory from BAL, LLL Updated:  06/01/18 1457    Gram stain [[id number]] Collected:  06/01/18 1157    Order Status:  Canceled Specimen:  Body Fluid from Lung, [place] Updated:  06/01/18 1456    Cryptococcal antigen [[id number]] Collected:  05/30/18 1222    Order Status:  Completed Specimen:  Blood from Blood Updated:  05/31/18 1228     Cryptococcal Ag, Blood Negative    Respiratory Viral Panel by PCR [facility]; Nasal Swab [[id number]] Collected:  05/29/18 2333    Order Status:  Completed Specimen:  Respiratory Updated:  05/31/18 1042     Respiratory Virus Panel, source Nasal Swab     RVP - Adenovirus Not Detected     Comment: Detects Serotypes B and E. Detection of Serotype C may   be limited. If Adenovirus infection is suspected and a   Not Detected result is returned the sample should be   re-tested for Adenovirus using  an independent method  (e.g. [employer] Adenovirus Quantitative Real-Time  PCR test.          Enterovirus Not Detected     Comment: Cross-reactivity has been observed between certain Rhinovirus  strains and the Enterovirus assay.          Human Bocavirus Not Detected     Human Coronavirus Not Detected     Comment: The Human Coronavirus assay detects Human coronavirus types  229E, OC43,NL63 and HKU1.          RVP - Human Metapneumovirus (hMPV) Not Detected     RVP - Influenza A Not Detected     Influenza A - B5N3-77 Not Detected     RVP - Influenza B Not Detected     Parainfluenza Not Detected     Respiratory Syncytial VirusVirus (RSV) A Not Detected     Comment: The Respiratory Syncytial Viral assay detects types A and B,  however it does not distinguish between the two.          RVP - Rhinovirus Not Detected     Comment: Cross-Reactivity has been observed between certain   Rhinovirus strains and the Enterovirus assay.  Target Enriched Mulitplex Polymerase Chain Reaction (TEM-PCR)  allows for the detection of multiple pathogens out of a single  reaction.  This test was developed and its performance   characteristics determined by [employer].  It has not   been cleared or approved by the U.S.Food and Drug Administration.  Results should be used in conjunction with clinical findings,   and should not form the sole basis for a diagnosis or treatment  decision.  TEM-PCR is a licensed technology of [employer].         Narrative:       Receiving Lab:->[facility]    Respiratory Viral Panel by PCR [facility]; Levi [[id number]]     Order Status:  Canceled Specimen:  Respiratory           I have reviewed all pertinent labs within the past 24 hours.    Estimated Creatinine Clearance: 55.1 mL/min (A) (based on SCr of 1.6 mg/dL (H)).    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 She denies him having any SOB, chest pain, diarrhea, nausea, vomiting. He doesnot make any urine since he has been non HD. Fever was noted to be as high as 101.6.She also notes that he has been coughing since being in ER but not before that.No SOB with PT at rehab.No dizziness or syncope. Wife reports he got an extra HD on Thursday because nephrology wanted to remove extra fluid.He has had a good appetite and tolerating PO and per wife has been working with PT and getting stronger. He has finished his abx course from last visit already.    * Fever    - Documented on Day two of stay (tmax 102.4)  - afebrile for > 48 hrs  - Blood Cultures and resp panel continues to be  Negative including RSV  - CMV weakly positive  - Bronch cultures negative so far  - CT scan + for congestion but equivocal for pneumonia. Present of moderate R- pleural effusion  - No leukocytosis but immunocompromised   - patient is anuric  - Continue cefepime and d/c vanco per ID. Dr. Eric will see patient           Physical debility    - secondary to prolonged illness  - PT/OT in patient and d/c back to rehab once cleared          Heart transplanted    -S/p OHT 2014  -Continue prednisone and cellcept and tacrolimus  -Tacro level 5.1 today. Goal 5-10  - increased dose to 5/5 BID  yesterday.  -Plan to adjust today as needed        Diarrhea of presumed infectious origin    - improving. Possibly iatrogenic  - could be from current abx  - could also be CMV reactivation recurrence  - C- diff negative this mroning  - discontinue stool motility agents and d/c vancomycin  - Discussed with ID (Dr. Eric)         Pulmonary infiltrates on CXR    - Had bronch yesterday  - cultures from BAL pending. Continue cefepime        Depression    - continue current at home depression regimen.  - will consider psych if necessary          Gangrene    - Dry gangrene developed previous admission with pressor use  - Will continue with betadine rx as current.   - Podiatry on board  - Foot XR and dopplers shows no changes concerning for infection          ESRD (end stage renal disease)    - on HD M, W, F  - BNP elevated > 1200 on admission  - s/p 2L UF yesterday night  - continue HD as scheduled. Appreciate nephrology assistance        Pleural effusion    Persistent right sided pleural effusion  Ct chest show moderate amount.   S/P bronch and lavage today (6/1/20180  Fu on results and pulmonary recomendation        Type 1 diabetes mellitus with stage 3 chronic kidney disease    Insulin sliding scale  -continue detemir  - endocrine consult        Hypothyroidism due to Hashimoto's thyroiditis    - continue synthroid at home dose  - TSH 12.4, T4 4.4   - subclinical hypothyroidism.         Anemia    - hb 8-9 at time of discharge.   - trending down after admission possibly from fq blood draws compounded by current/chronic infection.  - Prior iron labs shows adequate iron  - stable currently after 1PRBC on 5/31          Discussed plan of care with Dr. Shelley Walsh MD  Heart Transplant  [facility]

## 2018-06-02 NOTE — ASSESSMENT & PLAN NOTE
-S/p OHT 2014  -Continue prednisone and cellcept and tacrolimus  -Tacro level 5.1 today  - increased dose to 5/5 BID from yesterday.  -Plan to adjust today as needed

## 2018-06-02 NOTE — PROGRESS NOTES
[facility]  Infectious Disease  Progress Note    Patient Name: Lv Crocker  MRN: [id number]  Admission Date: 5/29/2018  Length of Stay: 4 days  Attending Physician: Heriberto Rosa MD  Primary Care Provider: Philippe Mohr MD    Isolation Status: No active isolations  Assessment/Plan:      * Fever    43yo man w/a history of DM1, Hashimoto's thyroiditis, and ICM (s/p OHT 11/18/2014, CMV D+/R+, steroid induction, on maintenance tacro/MMF/pred; c/b early hemorrhagic tamponade requiring washout, delayed closure, and pericardial window and subsequent AF w/RVR, and CACHORRO; late course c/b ABMR 4/2015 s/p rituxan, PLEX, and IVIG; subsequent C.diff/CMV reactivation; restrictive cardiomyopathy with recurrent pleural effusions s/p VATS/pleurex catheter 1/2018 and ascites requiring repeated LVP; and a prolonged admission in 3/2018 due to RSV-A multifocal pneumonia with superimposed bacterial pneumonia c/b prolonged hypoxic RF s/p trach/PEG now removed, progression of CKD to ESRD on iHD, and DIC) who was admitted on 5/29/2018 with acute onset fevers, hypoxia, and multifocal patchy pulmonary opacities due to presumptive bacterial pneumonia (CAP vs HCAP). He is improved on empiric vanc/cefepime. 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     - would continue vanc and cefepime for now --- will tailor based on BAL results by stopping vancomycin if no growth of S.aureus  - await pending BAL studies and fungal markers (negative to date)  - would hold off on starting antivirals for CMV reactivation of uncertain clinical significance -- would instead repeat CMV quant on Monday to reassess   - diarrhea noted on antibiotics -- C.diff testing negative, would stop offending agents (laxatives, MgOx) and reassess              Anticipated Disposition: pending improvement    Thank you for your consult.  I will follow-up with patient. Please contact us if you have any additional questions.     Maribell Eric MD  Transplant ID Attending  [phone]    Maribell Eric MD  Infectious Disease  [facility]    Subjective:     Principal Problem:Fever    HPI:     Interval History: Feels about the same today - diarrhea has recurred this afternoon (of note, was getting laxatives/MgOx until this morning). Remains afebrile. Cough non-productive. Afebrile. C.diff testing negative. BAL cx NGTD.    Review of Systems   Constitutional: Negative for activity change, appetite change, chills, fatigue, fever and unexpected weight change.   HENT: Negative for congestion, postnasal drip, sinus pressure and sore throat.    Eyes: Negative for visual disturbance.   Respiratory: Positive for cough. Negative for shortness of breath and wheezing.    Cardiovascular: Negative for chest pain, palpitations and leg swelling.   Gastrointestinal: Positive for diarrhea. Negative for abdominal distention, abdominal pain, nausea and vomiting.   Genitourinary: Positive for decreased urine volume and enuresis.   Musculoskeletal: Negative for arthralgias and myalgias.   Skin: Negative for rash.   Allergic/Immunologic: Positive for immunocompromised state.   Neurological: Negative for dizziness, syncope, weakness, light-headedness and headaches.   Psychiatric/Behavioral: Negative for confusion and decreased concentration.     Objective:     Vital Signs (Most Recent):  Temp: 98.2 °F (36.8 °C) (06/02/18 1500)  Pulse: (!) 113 (06/02/18 1500)  Resp: (!) 28 (06/02/18 1500)  BP: 115/70 (06/02/18 1500)  SpO2: 96 % (06/02/18 1500) Vital Signs (24h Range):  Temp:  [98 °F (36.7 °C)-99.6 °F (37.6 °C)] 98.2 °F (36.8 °C)  Pulse:  [108-122] 113  Resp:  [16-33] 28  SpO2:  [89 %-99 %] 96 %  BP: ()/(51-78) 115/70     Weight: 73.4 kg (161 lb 13.1 oz)  Body mass index is 25.34 kg/m².    Estimated Creatinine Clearance: 55.1 mL/min (A) (based on SCr of  1.6 mg/dL (H)).    Physical Exam   Constitutional: He is oriented to person, place, and time. No distress.   Appears chronically ill   HENT:   Head: Normocephalic and atraumatic.   Right Ear: External ear normal.   Left Ear: External ear normal.   Eyes: EOM are normal. Pupils are equal, round, and reactive to light.   Neck: Normal range of motion. Neck supple.   Cardiovascular: Normal rate, regular rhythm, normal heart sounds and intact distal pulses.    No murmur heard.  Pulmonary/Chest: Effort normal. No respiratory distress. He has no wheezes.   Decreased in bases (chronic effusion). On room air.   Abdominal: Soft. Bowel sounds are normal. He exhibits no distension. There is no tenderness.   Musculoskeletal: Normal range of motion.   Neurological: He is alert and oriented to person, place, and time. No cranial nerve deficit.   Skin: Skin is warm and dry. No rash noted. He is not diaphoretic.   Psychiatric: He has a normal mood and affect.       Significant Labs:   CBC:   Recent Labs  Lab 05/31/18  1840 06/01/18  0322 06/02/18  0441   WBC 3.82* 4.51 4.22   HGB 8.1* 7.8* 8.2*   HCT 25.4* 25.2* 26.7*    169 168     CMP:   Recent Labs  Lab 05/31/18  2112 06/01/18  0322 06/02/18  0441    141 140   K 4.7 4.4 4.5    108 108   CO2 24 26 24   * 88 75   BUN 17 7 10   CREATININE 1.9* 1.0 1.6*   CALCIUM 8.4* 8.1* 8.6*   PROT  --   --  5.5*   ALBUMIN 2.1* 2.1* 2.0*   BILITOT  --   --  0.6   ALKPHOS  --   --  137*   AST  --   --  16   ALT  --   --  12   ANIONGAP 9 7* 8   EGFRNONAA 41.9* >60.0 51.6*       Significant Imaging: I have reviewed all pertinent imaging results/findings within the past 24 hours.     Microbiology:  5/29 blood cx: negative  5/29 RVP negative  5/29 CMV quant: positive but <137  6/1 BAL cx: NGTD  6/1 C.diff: discordant, PCR negative

## 2018-06-02 NOTE — ASSESSMENT & PLAN NOTE
- improving. Possibly iatrogenic  - could be from current abx  - could also be CMV reactivation recurrence  - C- diff negative this mroning  - discontinue stool motility agents and d/c vancomycin  - Discussed with ID (Dr. Eric)

## 2018-06-02 NOTE — SUBJECTIVE & OBJECTIVE
Interval History: Feels about the same today - diarrhea has recurred this afternoon (of note, was getting laxatives/MgOx until this morning). Remains afebrile. Cough non-productive. Afebrile. C.diff testing negative. BAL cx NGTD.    Review of Systems   Constitutional: Negative for activity change, appetite change, chills, fatigue, fever and unexpected weight change.   HENT: Negative for congestion, postnasal drip, sinus pressure and sore throat.    Eyes: Negative for visual disturbance.   Respiratory: Positive for cough. Negative for shortness of breath and wheezing.    Cardiovascular: Negative for chest pain, palpitations and leg swelling.   Gastrointestinal: Positive for diarrhea. Negative for abdominal distention, abdominal pain, nausea and vomiting.   Genitourinary: Positive for decreased urine volume and enuresis.   Musculoskeletal: Negative for arthralgias and myalgias.   Skin: Negative for rash.   Allergic/Immunologic: Positive for immunocompromised state.   Neurological: Negative for dizziness, syncope, weakness, light-headedness and headaches.   Psychiatric/Behavioral: Negative for confusion and decreased concentration.     Objective:     Vital Signs (Most Recent):  Temp: 98.2 °F (36.8 °C) (06/02/18 1500)  Pulse: (!) 113 (06/02/18 1500)  Resp: (!) 28 (06/02/18 1500)  BP: 115/70 (06/02/18 1500)  SpO2: 96 % (06/02/18 1500) Vital Signs (24h Range):  Temp:  [98 °F (36.7 °C)-99.6 °F (37.6 °C)] 98.2 °F (36.8 °C)  Pulse:  [108-122] 113  Resp:  [16-33] 28  SpO2:  [89 %-99 %] 96 %  BP: ()/(51-78) 115/70     Weight: 73.4 kg (161 lb 13.1 oz)  Body mass index is 25.34 kg/m².    Estimated Creatinine Clearance: 55.1 mL/min (A) (based on SCr of 1.6 mg/dL (H)).    Physical Exam   Constitutional: He is oriented to person, place, and time. No distress.   Appears chronically ill   HENT:   Head: Normocephalic and atraumatic.   Right Ear: External ear normal.   Left Ear: External ear normal.   Eyes: EOM are normal. Pupils  are equal, round, and reactive to light.   Neck: Normal range of motion. Neck supple.   Cardiovascular: Normal rate, regular rhythm, normal heart sounds and intact distal pulses.    No murmur heard.  Pulmonary/Chest: Effort normal. No respiratory distress. He has no wheezes.   Decreased in bases (chronic effusion). On room air.   Abdominal: Soft. Bowel sounds are normal. He exhibits no distension. There is no tenderness.   Musculoskeletal: Normal range of motion.   Neurological: He is alert and oriented to person, place, and time. No cranial nerve deficit.   Skin: Skin is warm and dry. No rash noted. He is not diaphoretic.   Psychiatric: He has a normal mood and affect.       Significant Labs:   CBC:   Recent Labs  Lab 05/31/18  1840 06/01/18  0322 06/02/18  0441   WBC 3.82* 4.51 4.22   HGB 8.1* 7.8* 8.2*   HCT 25.4* 25.2* 26.7*    169 168     CMP:   Recent Labs  Lab 05/31/18  2112 06/01/18  0322 06/02/18  0441    141 140   K 4.7 4.4 4.5    108 108   CO2 24 26 24   * 88 75   BUN 17 7 10   CREATININE 1.9* 1.0 1.6*   CALCIUM 8.4* 8.1* 8.6*   PROT  --   --  5.5*   ALBUMIN 2.1* 2.1* 2.0*   BILITOT  --   --  0.6   ALKPHOS  --   --  137*   AST  --   --  16   ALT  --   --  12   ANIONGAP 9 7* 8   EGFRNONAA 41.9* >60.0 51.6*       Significant Imaging: I have reviewed all pertinent imaging results/findings within the past 24 hours.     Microbiology:  5/29 blood cx: negative  5/29 RVP negative  5/29 CMV quant: positive but <137  6/1 BAL cx: NGTD  6/1 C.diff: discordant, PCR negative

## 2018-06-02 NOTE — PROGRESS NOTES
Patient has an episode of projectile vomiting and continually coughing. Notified Dr. Sotomayor. Doctor ordered zofran IV. Will continue to monitor.

## 2018-06-02 NOTE — SUBJECTIVE & OBJECTIVE
Interval History:    Continue to have diarrhea this morning but less frequent. Had mild nausea and x1 emesis too. C-diff negative per ID (tox negative, Marielena positive), Blood pressure labile last night requiring 250mL bolus. No current complain     Continuous Infusions:  Scheduled Meds:   sodium chloride 0.9%   Intravenous Once    aspirin  81 mg Oral Daily    ceFEPime (MAXIPIME) IVPB  1 g Intravenous Q12H    cetirizine  5 mg Oral Daily    enoxaparin  30 mg Subcutaneous Daily    epoetin jose miguel (PROCRIT) injection  10,000 Units Intravenous Every Mon, Wed, Fri    escitalopram oxalate  20 mg Oral Daily    gabapentin  100 mg Oral TID    insulin detemir U-100  8 Units Subcutaneous BID    levalbuterol  0.63 mg Nebulization Q4H WAKE    levothyroxine  137 mcg Oral Before breakfast    midodrine  10 mg Oral TID WM    mycophenolate  250 mg Oral BID    pantoprazole  40 mg Oral Daily    potassium, sodium phosphates  1 packet Oral QID (AC & HS)    pravastatin  40 mg Oral Daily    predniSONE  2.5 mg Oral Daily    QUEtiapine  50 mg Oral QHS    tacrolimus  5 mg Oral BID     PRN Meds:sodium chloride, sodium chloride 0.9%, acetaminophen, ALPRAZolam, benzonatate, dextrose 50%, dextrose 50%, fentaNYL, glucagon (human recombinant), glucose, glucose, insulin aspart U-100, lidocaine HCL 2%, midazolam, midazolam, ondansetron, oxyCODONE, promethazine (PHENERGAN) IVPB    Review of patient's allergies indicates:   Allergen Reactions    No known drug allergies      Objective:     Vital Signs (Most Recent):  Temp: 99.6 °F (37.6 °C) (06/02/18 0700)  Pulse: (!) 117 (06/02/18 0900)  Resp: (!) 23 (06/02/18 0900)  BP: (!) 89/53 (06/02/18 0900)  SpO2: (!) 92 % (06/02/18 0900) Vital Signs (24h Range):  Temp:  [98 °F (36.7 °C)-99.6 °F (37.6 °C)] 99.6 °F (37.6 °C)  Pulse:  [109-122] 117  Resp:  [15-28] 23  SpO2:  [89 %-100 %] 92 %  BP: ()/(51-71) 89/53     Patient Vitals for the past 72 hrs (Last 3 readings):   Weight   06/02/18  0400 73.4 kg (161 lb 13.1 oz)   06/01/18 0400 73 kg (160 lb 15 oz)     Body mass index is 25.34 kg/m².      Intake/Output Summary (Last 24 hours) at 06/02/18 0956  Last data filed at 06/02/18 0900   Gross per 24 hour   Intake              625 ml   Output                0 ml   Net              625 ml       Hemodynamic Parameters:       Telemetry: baseline sinus tach    Physical Exam   Constitutional: He is oriented to person, place, and time.    very comfortable on bed this morning  Eyes: Pupils are equal, round, and reactive to light.   Neck: No JVD present.   Cardiovascular: Normal rate and regular rhythm.    No murmur heard.  Tachycardia present   Pulmonary/Chest: Effort normal and breath sounds normal.   Abdominal: Soft. Bowel sounds are normal.   No fluid wave   Musculoskeletal: He exhibits no edema or tenderness.   Neurological: He is alert and oriented to person, place, and time.   Skin: Skin is warm and dry. Dry gangrene to toes bilaterally. Left great toe gangrene improved and less tender. No drainage  Psychiatric: mood and affect not evaluated but grossly normal  Nursing note reviewed.    Significant Labs:  CBC:    Recent Labs  Lab 05/31/18  1840 06/01/18  0322 06/02/18  0441   WBC 3.82* 4.51 4.22   RBC 2.64* 2.62* 2.73*   HGB 8.1* 7.8* 8.2*   HCT 25.4* 25.2* 26.7*    169 168   MCV 96 96 98   MCH 30.7 29.8 30.0   MCHC 31.9* 31.0* 30.7*     BNP:    Recent Labs  Lab 05/29/18  0303   BNP 1,228*     CMP:    Recent Labs  Lab 05/30/18  0400 05/31/18  0300  05/31/18  2112 06/01/18  0322 06/02/18  0441   *  197* 237*  < > 149* 88 75   CALCIUM 8.3*  8.3* 8.4*  < > 8.4* 8.1* 8.6*   ALBUMIN 2.3*  2.3* 2.1*  < > 2.1* 2.1* 2.0*   PROT 5.9* 5.4*  --   --   --  5.5*     139 136  < > 140 141 140   K 4.0  4.0 4.2  < > 4.7 4.4 4.5   CO2 24  24 24  < > 24 26 24     104 103  < > 107 108 108   BUN 14  14 26*  < > 17 7 10   CREATININE 2.2*  2.2* 3.0*  < > 1.9* 1.0 1.6*   ALKPHOS 147* 136*  --    --   --  137*   ALT 13 11  --   --   --  12   AST 21 15  --   --   --  16   BILITOT 0.5 0.5  --   --   --  0.6   < > = values in this interval not displayed.   Coagulation:   No results for input(s): PT, INR, APTT in the last 168 hours.  LDH:    Recent Labs  Lab 05/31/18  0800        Microbiology:  Microbiology Results (last 7 days)     Procedure Component Value Units Date/Time    Blood culture #2 **CANNOT BE ORDERED STAT** [[id number]] Collected:  05/29/18 0319    Order Status:  Completed Specimen:  Blood from Peripheral, Hand, Left Updated:  06/02/18 0612     Blood Culture, Routine No Growth to date     Blood Culture, Routine No Growth to date     Blood Culture, Routine No Growth to date     Blood Culture, Routine No Growth to date     Blood Culture, Routine No Growth to date    Blood culture #1 **CANNOT BE ORDERED STAT** [[id number]] Collected:  05/29/18 0303    Order Status:  Completed Specimen:  Blood from Peripheral, Wrist, Right Updated:  06/02/18 0612     Blood Culture, Routine No Growth to date     Blood Culture, Routine No Growth to date     Blood Culture, Routine No Growth to date     Blood Culture, Routine No Growth to date     Blood Culture, Routine No Growth to date    C Diff Toxin by PCR [[id number]] Collected:  06/01/18 1514    Order Status:  Completed Updated:  06/02/18 0404     C. diff PCR Negative    Clostridium difficile EIA [[id number]]  (Abnormal) Collected:  06/01/18 1514    Order Status:  Completed Specimen:  Stool from Stool Updated:  06/02/18 0319     C. diff Antigen Positive (A)     C difficile Toxins A+B, EIA Negative     Comment: Testing not recommended for children <24 months old.       Culture, Respiratory [[id number]] Collected:  06/01/18 1156    Order Status:  Completed Specimen:  Respiratory from BAL, LLL Updated:  06/01/18 1816     Gram Stain (Respiratory) <10 epithelial cells per low power field.     Gram Stain (Respiratory) Rare WBC's     Gram Stain (Respiratory) No organisms  seen    MARQUITA prep [[id number]] Collected:  06/01/18 1157    Order Status:  Completed Specimen:  Body Fluid from Lung, Lingula Updated:  06/01/18 1731     KOH Prep No yeast or fungal elements seen    Narrative:       Bronchial Wash    Respiratory Viral Panel by PCR [facility]; Sputum (BAL) [[id number]] Collected:  06/01/18 1157    Order Status:  Sent Specimen:  Nasopharyngeal Updated:  06/01/18 1458    Fungus culture [[id number]] Collected:  06/01/18 1157    Order Status:  Sent Specimen:  Body Fluid from Lung, Lingula Updated:  06/01/18 1458    AFB Culture & Smear [[id number]] Collected:  06/01/18 1157    Order Status:  Sent Specimen:  Body Fluid from Lung, Lingula Updated:  06/01/18 1457    Culture, Respiratory with Gram Stain [[id number]] Collected:  06/01/18 1156    Order Status:  Canceled Specimen:  Respiratory from BAL, LLL Updated:  06/01/18 1457    Gram stain [[id number]] Collected:  06/01/18 1157    Order Status:  Canceled Specimen:  Body Fluid from Lung, Lingula Updated:  06/01/18 1456    Cryptococcal antigen [[id number]] Collected:  05/30/18 1222    Order Status:  Completed Specimen:  Blood from Blood Updated:  05/31/18 1228     Cryptococcal Ag, Blood Negative    Respiratory Viral Panel by PCR [facility]; Nasal Swab [[id number]] Collected:  05/29/18 2333    Order Status:  Completed Specimen:  Respiratory Updated:  05/31/18 1042     Respiratory Virus Panel, source Nasal Swab     RVP - Adenovirus Not Detected     Comment: Detects Serotypes B and E. Detection of Serotype C may   be limited. If Adenovirus infection is suspected and a   Not Detected result is returned the sample should be   re-tested for Adenovirus using an independent method  (e.g. [employer] Adenovirus Quantitative Real-Time  PCR test.          Enterovirus Not Detected     Comment: Cross-reactivity has been observed between certain Rhinovirus  strains and the Enterovirus assay.          Human Bocavirus Not Detected     Human Coronavirus Not Detected      Comment: The Human Coronavirus assay detects Human coronavirus types  229E, OC43,NL63 and HKU1.          RVP - Human Metapneumovirus (hMPV) Not Detected     RVP - Influenza A Not Detected     Influenza A - W5D5-82 Not Detected     RVP - Influenza B Not Detected     Parainfluenza Not Detected     Respiratory Syncytial VirusVirus (RSV) A Not Detected     Comment: The Respiratory Syncytial Viral assay detects types A and B,  however it does not distinguish between the two.          RVP - Rhinovirus Not Detected     Comment: Cross-Reactivity has been observed between certain   Rhinovirus strains and the Enterovirus assay.  Target Enriched Mulitplex Polymerase Chain Reaction (TEM-PCR)  allows for the detection of multiple pathogens out of a single  reaction.  This test was developed and its performance   characteristics determined by [facility].  It has not   been cleared or approved by the U.S.Food and Drug Administration.  Results should be used in conjunction with clinical findings,   and should not form the sole basis for a diagnosis or treatment  decision.  TEM-PCR is a licensed technology of [employer].         Narrative:       Receiving Lab:->[facility]    Respiratory Viral Panel by PCR [facility]; Sputum [[id number]]     Order Status:  Canceled Specimen:  Respiratory           I have reviewed all pertinent labs within the past 24 hours.    Estimated Creatinine Clearance: 55.1 mL/min (A) (based on SCr of 1.6 mg/dL (H)).    Diagnostic Results:

## 2018-06-02 NOTE — ASSESSMENT & PLAN NOTE
CACHORRO on superimposed on CKD stage 4 secondary to prograft toxcity/ischemic ATN (see last admission consult 3/11-5/18/18-discharge to rehab) now dialysis dependent via [facility] (since 3/14/2018) who is admitted from [facility] for fever 100-101.6 since Friday night. Last HD yesterday 5/28 3 L removed.   -L IJ permacath no overt signs of tunnel or exit site infection.       Plan/recommendations:  -Will have dialysis treatment today for clearance, require midodrine before treatment   -C.diff positive, multiple bowel movement total 10   -Continue DAQUAN Q MWF

## 2018-06-02 NOTE — PLAN OF CARE
Problem: Patient Care Overview  Goal: Plan of Care Review  Outcome: Ongoing (interventions implemented as appropriate)  Neuro: AAOx4. Flat affect. Able to follow all commands. Pupils are equal and reactive. Denies any numbness or tingling in all 4 extremities.       Pulmonary: Breath sounds are clear with diminished bases but no signs of distress. RA sats 100%     Cardiovascular: Sinus tach. Drop in MAP of 57. Administered 250 cc NS bolus. BP stabilizing. Dr. Ferrer aware.      Gastrointestinal: Cardiac diet. All 4 bowel sounds are active. Denies any abdominal pain or tenderness. 4 BMs overnight. 1 projectile vomiting.      Genitourinary: Anuric.        Endocrine: CBG = 147. No insulin needed.     Integumentary/Other: Left and right big toe, right 2nd and third toe are black. Patient denies any numbness or tingling. Patient has 2 stage 2 pressure ulcers on coccyx. Applied triad cream.     Bed in low, locked position, call light within reach, side rails up x3. Gave complete chlorhexidine bath and changed linens. Will continue to monitor.

## 2018-06-02 NOTE — ASSESSMENT & PLAN NOTE
- hb 8-9 at time of discharge.   - trending down after admission possibly from fq blood draws compounded by current/chronic infection.  - Prior iron labs shows adequate iron  - stable currently after 1PRBC on 5/31

## 2018-06-02 NOTE — ASSESSMENT & PLAN NOTE
41yo man w/a history of DM1, Hashimoto's thyroiditis, and ICM (s/p OHT 11/18/2014, CMV D+/R+, steroid induction, on maintenance tacro/MMF/pred; c/b early hemorrhagic tamponade requiring washout, delayed closure, and pericardial window and subsequent AF w/RVR, and CACHORRO; late course c/b ABMR 4/2015 s/p rituxan, PLEX, and IVIG; subsequent C.diff/CMV reactivation; restrictive cardiomyopathy with recurrent pleural effusions s/p VATS/pleurex catheter 1/2018 and ascites requiring repeated LVP; and a prolonged admission in 3/2018 due to RSV-A multifocal pneumonia with superimposed bacterial pneumonia c/b prolonged hypoxic RF s/p trach/PEG now removed, progression of CKD to ESRD on iHD, and DIC) who was admitted on 5/29/2018 with acute onset fevers, hypoxia, and multifocal patchy pulmonary opacities due to presumptive bacterial pneumonia (CAP vs HCAP). He is improved on empiric vanc/cefepime. 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     - would continue vanc and cefepime for now --- will tailor based on BAL results by stopping vancomycin if no growth of S.aureus  - await pending BAL studies and fungal markers (negative to date)  - would hold off on starting antivirals for CMV reactivation of uncertain clinical significance -- would instead repeat CMV quant on Monday to reassess   - diarrhea noted on antibiotics -- C.diff testing negative, would stop offending agents (laxatives, MgOx) and reassess

## 2018-06-02 NOTE — ASSESSMENT & PLAN NOTE
- Documented on Day two of stay (tmax 102.4)  - afebrile for > 48 hrs  - Blood Cultures and resp panel continues to be  Negative including RSV  - CMV weakly positive  - Bronch cultures negative so far  - CT scan + for congestion but equivocal for pneumonia. Present of moderate R- pleural effusion  - No leukocytosis but immunocompromised   - patient is anuric  - Continue cefepime and d/c vanco per ID. Dr. Eric will see patient

## 2018-06-03 NOTE — ASSESSMENT & PLAN NOTE
CACHORRO on superimposed on CKD stage 4 secondary to prograft toxcity/ischemic ATN (see last admission consult 3/11-5/18/18-discharge to rehab) now dialysis dependent via [facility] (since 3/14/2018) who is admitted from [facility] for fever 100-101.6 since Friday night. Last HD yesterday 5/28 3 L removed.   -L IJ permacath no overt signs of tunnel or exit site infection.       Plan/recommendations:  -No need for dialysis today will reassess tomorrow if require  -Continue DAQUAN Q MWF

## 2018-06-03 NOTE — ASSESSMENT & PLAN NOTE
- on HD M, W, F  - BNP elevated > 1200 on admission  - continue HD as scheduled. Appreciate nephrology assistance

## 2018-06-03 NOTE — PLAN OF CARE
Problem: Patient Care Overview  Goal: Plan of Care Review  Outcome: Ongoing (interventions implemented as appropriate)  No acute events throughout day. See vital signs and assessments in flowsheets. See below for updates on today's progress.     Pulmonary: Patient on 1L NC r/t oxygen sats dipping to the mid 80s on occasion. Breathing txts changed to PRN.     Cardiovascular: HR remains ST at 104. Blood pressure /60-70s. Received order from Dr. Rosa to maintain MAP greater than 55 but otherwise he is not too concerned with the fluctuating low pressures.     Neurological: AAOX4.     Gastrointestinal: No BMs this shift. Occasional smear on pad.     Genitourinary: Anuric.    Endocrine: Normoglycemic.    Integumentary/Other: Patient got up to chair with PT. Max assist. Possible transfer tomorrow.     Infusions: No current infusions.     Patient progressing towards goals as tolerated, plan of care communicated and reviewed with Lv Crocker and family. All concerns addressed. Will continue to monitor.

## 2018-06-03 NOTE — PROGRESS NOTES
Patient has MAP of 59-61. Notified Dr. Muniz. Doctor ordered to be notified when MAP reaches 55 or lower and to administer fluid bolus at that time. Will continue to monitor.

## 2018-06-03 NOTE — SUBJECTIVE & OBJECTIVE
Interval History:  No acute overnight event. Diarrhea getting better. c-diff negative. Afebrile for > 48hrs now. BAL romero still in progress. Vanco trough noted to be high this morning. Denies abd pain, SOB.    Continuous Infusions:  Scheduled Meds:   sodium chloride 0.9%   Intravenous Once    aspirin  81 mg Oral Daily    ceFEPime (MAXIPIME) IVPB  1 g Intravenous Q12H    cetirizine  5 mg Oral Daily    enoxaparin  30 mg Subcutaneous Daily    epoetin jose miguel (PROCRIT) injection  10,000 Units Intravenous Every Mon, Wed, Fri    escitalopram oxalate  20 mg Oral Daily    gabapentin  100 mg Oral TID    insulin detemir U-100  8 Units Subcutaneous BID    levothyroxine  137 mcg Oral Before breakfast    midodrine  10 mg Oral TID WM    mycophenolate  250 mg Oral BID    pantoprazole  40 mg Oral Daily    potassium, sodium phosphates  1 packet Oral QID (AC & HS)    pravastatin  40 mg Oral Daily    predniSONE  2.5 mg Oral Daily    QUEtiapine  50 mg Oral QHS    tacrolimus  5 mg Oral BID     PRN Meds:sodium chloride, sodium chloride 0.9%, acetaminophen, ALPRAZolam, benzonatate, dextrose 50%, dextrose 50%, fentaNYL, glucagon (human recombinant), glucose, glucose, insulin aspart U-100, levalbuterol, lidocaine HCL 2%, midazolam, midazolam, ondansetron, oxyCODONE, promethazine (PHENERGAN) IVPB    Review of patient's allergies indicates:   Allergen Reactions    No known drug allergies      Objective:     Vital Signs (Most Recent):  Temp: 97.7 °F (36.5 °C) (06/03/18 1100)  Pulse: (!) 113 (06/03/18 1200)  Resp: 17 (06/03/18 1200)  BP: 91/67 (06/03/18 1200)  SpO2: 100 % (06/03/18 1200) Vital Signs (24h Range):  Temp:  [97.7 °F (36.5 °C)-98.8 °F (37.1 °C)] 97.7 °F (36.5 °C)  Pulse:  [106-121] 113  Resp:  [16-33] 17  SpO2:  [84 %-100 %] 100 %  BP: ()/(49-78) 91/67     Patient Vitals for the past 72 hrs (Last 3 readings):   Weight   06/03/18 0400 74 kg (163 lb 2.3 oz)   06/02/18 0400 73.4 kg (161 lb 13.1 oz)   06/01/18 0400  73 kg (160 lb 15 oz)     Body mass index is 25.55 kg/m².      Intake/Output Summary (Last 24 hours) at 06/03/18 1225  Last data filed at 06/03/18 1200   Gross per 24 hour   Intake              620 ml   Output              500 ml   Net              120 ml       Hemodynamic Parameters:       Telemetry: no event noted    Physical Exam     Constitutional: He is oriented to person, place, and time.    very comfortable on bed this morning  Eyes: Pupils are equal, round, and reactive to light.   Neck: No JVD present.   Cardiovascular: Normal rate and regular rhythm.    No murmur heard.  Tachycardia present   Pulmonary/Chest: Effort normal and breath sounds normal.   Abdominal: Soft. Bowel sounds are normal.   No fluid wave   Musculoskeletal: He exhibits no edema or tenderness.   Neurological: He is alert and oriented to person, place, and time.   Skin: Skin is warm and dry. Dry gangrene to toes bilaterally. Left great toe gangrene improved and less tender. No drainage  Psychiatric: mood and affect not evaluated but grossly normal  Nursing note reviewed.    Significant Labs:  CBC:    Recent Labs  Lab 06/01/18  0322 06/02/18  0441 06/03/18  0455   WBC 4.51 4.22 4.39   RBC 2.62* 2.73* 2.73*   HGB 7.8* 8.2* 8.3*   HCT 25.2* 26.7* 27.4*    168 178   MCV 96 98 100*   MCH 29.8 30.0 30.4   MCHC 31.0* 30.7* 30.3*     BNP:    Recent Labs  Lab 05/29/18  0303   BNP 1,228*     CMP:    Recent Labs  Lab 05/31/18  0300  06/01/18  0322 06/02/18  0441 06/03/18  0455   *  < > 88 75 145*   CALCIUM 8.4*  < > 8.1* 8.6* 8.4*   ALBUMIN 2.1*  < > 2.1* 2.0* 2.1*   PROT 5.4*  --   --  5.5* 5.4*     < > 141 140 139   K 4.2  < > 4.4 4.5 3.8   CO2 24  < > 26 24 25     < > 108 108 106   BUN 26*  < > 7 10 9   CREATININE 3.0*  < > 1.0 1.6* 1.5*   ALKPHOS 136*  --   --  137* 125   ALT 11  --   --  12 10   AST 15  --   --  16 15   BILITOT 0.5  --   --  0.6 0.5   < > = values in this interval not displayed.   Coagulation:   No  results for input(s): PT, INR, APTT in the last 168 hours.  LDH:  No results for input(s): LDH in the last 72 hours.  Microbiology:  Microbiology Results (last 7 days)     Procedure Component Value Units Date/Time    Blood culture #2 **CANNOT BE ORDERED STAT** [[id number]] Collected:  05/29/18 0319    Order Status:  Completed Specimen:  Blood from Peripheral, Hand, Left Updated:  06/03/18 0612     Blood Culture, Routine No growth after 5 days.    Blood culture #1 **CANNOT BE ORDERED STAT** [[id number]] Collected:  05/29/18 0303    Order Status:  Completed Specimen:  Blood from Peripheral, Wrist, Right Updated:  06/03/18 0612     Blood Culture, Routine No growth after 5 days.    AFB Culture & Smear [[id number]] Collected:  06/01/18 1157    Order Status:  Completed Specimen:  Body Fluid from Lung, Ling[place] Updated:  06/02/18 2127     AFB Culture & Smear Culture in progress    Culture, Respiratory [[id number]] Collected:  06/01/18 1156    Order Status:  Completed Specimen:  Respiratory from BAL, LLL Updated:  06/02/18 1136     Respiratory Culture No Growth     Gram Stain (Respiratory) <10 epithelial cells per low power field.     Gram Stain (Respiratory) Rare WBC's     Gram Stain (Respiratory) No organisms seen    C Diff Toxin by PCR [[id number]] Collected:  06/01/18 1514    Order Status:  Completed Updated:  06/02/18 0404     C. diff PCR Negative    Clostridium difficile EIA [[id number]]  (Abnormal) Collected:  06/01/18 1514    Order Status:  Completed Specimen:  Stool from Stool Updated:  06/02/18 0319     C. diff Antigen Positive (A)     C difficile Toxins A+B, EIA Negative     Comment: Testing not recommended for children <24 months old.       KOH prep [[id number]] Collected:  06/01/18 1157    Order Status:  Completed Specimen:  Body Fluid from Lung, Ling[place] Updated:  06/01/18 1731     KOH Prep No yeast or fungal elements seen    Narrative:       Bronchial Wash    Respiratory Viral Panel by PCR [facility]; Sputum (BAL)  [[id number]] Collected:  06/01/18 1157    Order Status:  Sent Specimen:  Nasopharyngeal Updated:  06/01/18 1458    Fungus culture [[id number]] Collected:  06/01/18 1157    Order Status:  Sent Specimen:  Body Fluid from Lung, [place] Updated:  06/01/18 1458    Culture, Respiratory with Gram Stain [[id number]] Collected:  06/01/18 1156    Order Status:  Canceled Specimen:  Respiratory from BAL, LLL Updated:  06/01/18 1457    Gram stain [[id number]] Collected:  06/01/18 1157    Order Status:  Canceled Specimen:  Body Fluid from Lung, [place] Updated:  06/01/18 1456    Cryptococcal antigen [[id number]] Collected:  05/30/18 1222    Order Status:  Completed Specimen:  Blood from Blood Updated:  05/31/18 1228     Cryptococcal Ag, Blood Negative    Respiratory Viral Panel by PCR [facility]; Nasal Swab [[id number]] Collected:  05/29/18 2333    Order Status:  Completed Specimen:  Respiratory Updated:  05/31/18 1042     Respiratory Virus Panel, source Nasal Swab     RVP - Adenovirus Not Detected     Comment: Detects Serotypes B and E. Detection of Serotype C may   be limited. If Adenovirus infection is suspected and a   Not Detected result is returned the sample should be   re-tested for Adenovirus using an independent method  (e.g. [employer] Adenovirus Quantitative Real-Time  PCR test.          Enterovirus Not Detected     Comment: Cross-reactivity has been observed between certain Rhinovirus  strains and the Enterovirus assay.          Human Bocavirus Not Detected     Human Coronavirus Not Detected     Comment: The Human Coronavirus assay detects Human coronavirus types  229E, OC43,NL63 and HKU1.          RVP - Human Metapneumovirus (hMPV) Not Detected     RVP - Influenza A Not Detected     Influenza A - Z3Y5-64 Not Detected     RVP - Influenza B Not Detected     Parainfluenza Not Detected     Respiratory Syncytial VirusVirus (RSV) A Not Detected     Comment: The Respiratory Syncytial Viral assay detects types A and  B,  however it does not distinguish between the two.          RVP - Rhinovirus Not Detected     Comment: Cross-Reactivity has been observed between certain   Rhinovirus strains and the Enterovirus assay.  Target Enriched Mulitplex Polymerase Chain Reaction (TEM-PCR)  allows for the detection of multiple pathogens out of a single  reaction.  This test was developed and its performance   characteristics determined by [employer].  It has not   been cleared or approved by the U.S.Food and Drug Administration.  Results should be used in conjunction with clinical findings,   and should not form the sole basis for a diagnosis or treatment  decision.  TEM-PCR is a licensed technology of [employer].         Narrative:       Receiving Lab:->[facility]    Respiratory Viral Panel by PCR [facility]; Sputum [[id number]]     Order Status:  Canceled Specimen:  Respiratory           I have reviewed all pertinent labs within the past 24 hours.    Estimated Creatinine Clearance: 58.8 mL/min (A) (based on SCr of 1.5 mg/dL (H)).    Diagnostic Results:

## 2018-06-03 NOTE — SUBJECTIVE & OBJECTIVE
Interval History:   Patient evaluated at bedside, bowel movement have decreased only made 1 yesterday. Had HD yesterday for clearance and was well tolerated.     Review of patient's allergies indicates:   Allergen Reactions    No known drug allergies      Current Facility-Administered Medications   Medication Frequency    0.9%  NaCl infusion (for blood administration) Q24H PRN    0.9%  NaCl infusion PRN    0.9%  NaCl infusion Once    acetaminophen tablet 650 mg Q6H PRN    ALPRAZolam tablet 0.5 mg Q6H PRN    aspirin EC tablet 81 mg Daily    benzonatate capsule 100 mg TID PRN    ceFEPIme injection 1 g Q12H    cetirizine tablet 5 mg Daily    dextrose 50% injection 12.5 g PRN    dextrose 50% injection 25 g PRN    enoxaparin injection 30 mg Daily    epoetin jose miguel injection 10,000 Units Every Mon, Wed, Fri    escitalopram oxalate tablet 20 mg Daily    fentaNYL injection 100 mcg PRN    gabapentin capsule 100 mg TID    glucagon (human recombinant) injection 1 mg PRN    glucose chewable tablet 16 g PRN    glucose chewable tablet 24 g PRN    insulin aspart U-100 pen 1-10 Units QID (AC + HS) PRN    insulin detemir U-100 pen 8 Units BID    levalbuterol nebulizer solution 0.63 mg Q4H PRN    levothyroxine tablet 137 mcg Before breakfast    lidocaine HCL 2% jelly PRN    midazolam (VERSED) 1 mg/mL injection 10 mg On Call Procedure    midazolam (VERSED) 1 mg/mL injection 4 mg PRN    midodrine tablet 10 mg TID WM    mycophenolate capsule 250 mg BID    ondansetron disintegrating tablet 8 mg Q8H PRN    oxyCODONE immediate release tablet 5 mg Q6H PRN    pantoprazole EC tablet 40 mg Daily    potassium, sodium phosphates 280-160-250 mg packet 1 packet QID (AC & HS)    pravastatin tablet 40 mg Daily    predniSONE tablet 2.5 mg Daily    promethazine (PHENERGAN) 12.5 mg in dextrose 5 % 50 mL IVPB Q6H PRN    QUEtiapine tablet 50 mg QHS    tacrolimus capsule 5 mg BID       Objective:     Vital Signs (Most  Recent):  Temp: 98.1 °F (36.7 °C) (06/03/18 0700)  Pulse: (!) 112 (06/03/18 0800)  Resp: 17 (06/03/18 0800)  BP: (!) 84/50 (06/03/18 0800)  SpO2: 100 % (06/03/18 0800)  O2 Device (Oxygen Therapy): room air (06/03/18 0800) Vital Signs (24h Range):  Temp:  [98 °F (36.7 °C)-98.8 °F (37.1 °C)] 98.1 °F (36.7 °C)  Pulse:  [106-121] 112  Resp:  [16-33] 17  SpO2:  [84 %-100 %] 100 %  BP: ()/(49-78) 84/50     Weight: 74 kg (163 lb 2.3 oz) (06/03/18 0400)  Body mass index is 25.55 kg/m².  Body surface area is 1.87 meters squared.    I/O last 3 completed shifts:  In: 735 [I.V.:185; Other:500; IV Piggyback:50]  Out: 500 [Other:500]    Physical Exam   Constitutional: He is oriented to person, place, and time. He appears well-developed. He appears ill. No distress. Nasal cannula in place.   HENT:   Head: Normocephalic and atraumatic.   Right Ear: External ear normal.   Left Ear: External ear normal.   Eyes: Conjunctivae and EOM are normal. Right eye exhibits no discharge. Left eye exhibits no discharge.   Neck: Normal range of motion. Neck supple.   Cardiovascular: Regular rhythm.  Tachycardia present.    Pulmonary/Chest: Effort normal. No respiratory distress. He has no wheezes. He has rales.   Abdominal: Soft. Bowel sounds are normal. He exhibits no distension. There is no tenderness.   Musculoskeletal: He exhibits edema.   Neurological: He is alert and oriented to person, place, and time.   Skin: Skin is warm and dry. He is not diaphoretic.   -L IJ Permacath   -Right necrotic toes 1-3-similar to previous    Psychiatric: He has a normal mood and affect.       Significant Labs:  ABGs: No results for input(s): PH, PCO2, HCO3, POCSATURATED, BE in the last 168 hours.  BMP:   Recent Labs  Lab 06/03/18  0455   *      CO2 25   BUN 9   CREATININE 1.5*   CALCIUM 8.4*   MG 1.9     CBC:   Recent Labs  Lab 06/03/18  0455   WBC 4.39   RBC 2.73*   HGB 8.3*   HCT 27.4*      *   MCH 30.4   MCHC 30.3*     CMP:    Recent Labs  Lab 06/03/18  0455   *   CALCIUM 8.4*   ALBUMIN 2.1*   PROT 5.4*      K 3.8   CO2 25      BUN 9   CREATININE 1.5*   ALKPHOS 125   ALT 10   AST 15   BILITOT 0.5     All labs within the past 24 hours have been reviewed.

## 2018-06-03 NOTE — ASSESSMENT & PLAN NOTE
-S/p OHT 2014  -Continue prednisone and cellcept and tacrolimus  -Tacro level 5.3 today  -continue tacro 5/5 BID from yesterday. Goal is 5-10  -Plan to adjust today as needed

## 2018-06-03 NOTE — ASSESSMENT & PLAN NOTE
- Documented on Day two of stay (tmax 102.4)  - possibly from Pneumonia  - afebrile for > 48 hrs  - Blood Cultures and resp panel continues to be  Negative including RSV  - CMV weakly positive  - Bronch cultures in progress.   - CT scan  On admission + for congestion but equivocal for pneumonia. Present of moderate R- pleural effusion  - No leukocytosis but immunocompromised   - patient is anuric  - Continue cefepime and vanco per ID. If BAL results negative, can d/c vanco  - Vanco trough noted to be elevated this morning. Repeat level again tomorrow

## 2018-06-03 NOTE — PROGRESS NOTES
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 with recurrent pleural effusions/ascites requiring monthly thoracenteses/paracenteses (last paracentesis 5/15/18 3.5 L), VATS 1/11/18 with Pleur-X catheter (now removed), and recent admission for diarrhea and RSV complicated with respiratory failure requiring intubation/trach/PEG (both Trach/PEG removed 5/10) and CACHORRO on superimposed on CKD stage 4 secondary to prograft toxcity?/ischemic ATN (see last admission consult 3/11-5/18/18-discharge to rehab) now dialysis dependent via [facility] (since 3/14/2018) who is admitted from [facility] for fever 100-101.6 since Friday night. Denies PND, rhinorrhea, cough or shortness of breath. Wife reports doing well at Rehab up -transfers and parallel bars.  Eating well since appetite stimulator started at rehab. Had HD 4 days last week MWTF due to volume excess. Last HD yesterday 3 L removed. Denies L IJ site tenderness.     CT chest wo contrast showed patchy subsegmental opacities through out lungs, mod volume right sided pleural effusion with trace left side pleural fluid and persistent round RLL opacity, new mod t12 compression deformity, and small volume abdominal ascites. Lactate 0.9. Blood cultures collected in ED. Vancomycin started in ED. SBP , Tacy 111's,  % on 2 L NC.     Interval History:   Patient evaluated at bedside, has had significant bowel movements yesterday 10 total plus one episode of vomiting. Denies any shortness of breath.     Review of patient's  allergies indicates:   Allergen Reactions    No known drug allergies      Current Facility-Administered Medications   Medication Frequency    0.9%  NaCl infusion (for blood administration) Q24H PRN    0.9%  NaCl infusion PRN    0.9%  NaCl infusion Once    acetaminophen tablet 650 mg Q6H PRN    ALPRAZolam tablet 0.5 mg Q6H PRN    aspirin EC tablet 81 mg Daily    benzonatate capsule 100 mg TID PRN    ceFEPIme injection 1 g Q12H    cetirizine tablet 5 mg Daily    dextrose 50% injection 12.5 g PRN    dextrose 50% injection 25 g PRN    enoxaparin injection 30 mg Daily    epoetin jose miguel injection 10,000 Units Every Mon, Wed, Fri    escitalopram oxalate tablet 20 mg Daily    fentaNYL injection 100 mcg PRN    gabapentin capsule 100 mg TID    glucagon (human recombinant) injection 1 mg PRN    glucose chewable tablet 16 g PRN    glucose chewable tablet 24 g PRN    insulin aspart U-100 pen 1-10 Units QID (AC + HS) PRN    insulin detemir U-100 pen 8 Units BID    levalbuterol nebulizer solution 0.63 mg Q4H WAKE    levothyroxine tablet 137 mcg Before breakfast    lidocaine HCL 2% jelly PRN    magnesium oxide tablet 400 mg BID    midazolam (VERSED) 1 mg/mL injection 10 mg On Call Procedure    midazolam (VERSED) 1 mg/mL injection 4 mg PRN    midodrine tablet 10 mg TID WM    mycophenolate capsule 250 mg BID    ondansetron disintegrating tablet 8 mg Q8H PRN    oxyCODONE immediate release tablet 5 mg Q6H PRN    pantoprazole EC tablet 40 mg Daily    polyethylene glycol packet 17 g BID    potassium, sodium phosphates 280-160-250 mg packet 1 packet QID (AC & HS)    pravastatin tablet 40 mg Daily    predniSONE tablet 2.5 mg Daily    promethazine (PHENERGAN) 12.5 mg in dextrose 5 % 50 mL IVPB Q6H PRN    QUEtiapine tablet 50 mg QHS    tacrolimus capsule 5 mg BID       Objective:     Vital Signs (Most Recent):  Temp: 99.6 °F (37.6 °C) (06/02/18 0700)  Pulse: (!) 117 (06/02/18 0900)  Resp: (!) 23  (06/02/18 0900)  BP: (!) 89/53 (06/02/18 0900)  SpO2: (!) 92 % (06/02/18 0900)  O2 Device (Oxygen Therapy): room air (06/02/18 0900) Vital Signs (24h Range):  Temp:  [98 °F (36.7 °C)-99.6 °F (37.6 °C)] 99.6 °F (37.6 °C)  Pulse:  [109-122] 117  Resp:  [15-28] 23  SpO2:  [89 %-100 %] 92 %  BP: ()/(51-71) 89/53     Weight: 73.4 kg (161 lb 13.1 oz) (06/02/18 0400)  Body mass index is 25.34 kg/m².  Body surface area is 1.86 meters squared.    I/O last 3 completed shifts:  In: 3955 [P.O.:480; I.V.:2925; IV Piggyback:550]  Out: 4274 [Other:4274]    Physical Exam   Constitutional: He is oriented to person, place, and time. He appears well-developed. He appears ill. No distress. Nasal cannula in place.   HENT:   Head: Normocephalic and atraumatic.   Right Ear: External ear normal.   Left Ear: External ear normal.   Eyes: Conjunctivae and EOM are normal. Right eye exhibits no discharge. Left eye exhibits no discharge.   Neck: Normal range of motion. Neck supple.   Cardiovascular: Regular rhythm.  Tachycardia present.    Pulmonary/Chest: Effort normal. No respiratory distress. He has no wheezes. He has rales.   Abdominal: Soft. Bowel sounds are normal. He exhibits no distension. There is no tenderness.   Musculoskeletal: He exhibits edema.   Neurological: He is alert and oriented to person, place, and time.   Skin: Skin is warm and dry. He is not diaphoretic.   -L IJ Permacath   -Right necrotic toes 1-3-similar to previous    Psychiatric: He has a normal mood and affect.       Significant Labs:  ABGs: No results for input(s): PH, PCO2, HCO3, POCSATURATED, BE in the last 168 hours.  BMP:   Recent Labs  Lab 06/02/18  0441   GLU 75      CO2 24   BUN 10   CREATININE 1.6*   CALCIUM 8.6*   MG 1.6     CBC:   Recent Labs  Lab 06/02/18  0441   WBC 4.22   RBC 2.73*   HGB 8.2*   HCT 26.7*      MCV 98   MCH 30.0   MCHC 30.7*     CMP:   Recent Labs  Lab 06/02/18  0441   GLU 75   CALCIUM 8.6*   ALBUMIN 2.0*   PROT 5.5*       K 4.5   CO2 24      BUN 10   CREATININE 1.6*   ALKPHOS 137*   ALT 12   AST 16   BILITOT 0.6     All labs within the past 24 hours have been reviewed.       Assessment/Plan:     ESRD (end stage renal disease)    CACHORRO on superimposed on CKD stage 4 secondary to prograft toxcity/ischemic ATN (see last admission consult 3/11-5/18/18-discharge to rehab) now dialysis dependent via [facility] (since 3/14/2018) who is admitted from [facility] for fever 100-101.6 since Friday night. Last HD yesterday 5/28 3 L removed.   -L IJ permacath no overt signs of tunnel or exit site infection.       Plan/recommendations:  -Will have dialysis treatment today for clearance, require midodrine before treatment   -C.diff positive, multiple bowel movement total 10   -Continue DAQUAN Q MWF             Edson Soriano  Nephrology  Fellow  [facility]    Pager [phone]

## 2018-06-03 NOTE — PROGRESS NOTES
[facility]  Infectious Disease  Progress Note    Patient Name: Lv Crocker  MRN: [id number]  Admission Date: 5/29/2018  Length of Stay: 5 days  Attending Physician: Heriberto Rosa MD  Primary Care Provider: Philippe Mohr MD    Isolation Status: No active isolations  Assessment/Plan:      * Fever    43yo man w/a history of DM1, Hashimoto's thyroiditis, and ICM (s/p OHT 11/18/2014, CMV D+/R+, steroid induction, on maintenance tacro/MMF/pred; c/b early hemorrhagic tamponade requiring washout, delayed closure, and pericardial window and subsequent AF w/RVR, and CACHORRO; late course c/b ABMR 4/2015 s/p rituxan, PLEX, and IVIG; subsequent C.diff/CMV reactivation; restrictive cardiomyopathy with recurrent pleural effusions s/p VATS/pleurex catheter 1/2018 and ascites requiring repeated LVP; and a prolonged admission in 3/2018 due to RSV-A multifocal pneumonia with superimposed bacterial pneumonia c/b prolonged hypoxic RF s/p trach/PEG now removed, progression of CKD to ESRD on iHD, and DIC) who was admitted on 5/29/2018 with acute onset fevers, hypoxia, and multifocal patchy pulmonary opacities due to presumptive bacterial pneumonia (CAP vs HCAP). He is improved on empiric vanc/cefepime. 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      - would stop vanc and cefepime and complete 7 day course of antibiotics for HCAP with moxifloxacin  - BAL cultures and fungal markers negative to date -- please recontact me if surprising growth  - would hold off on starting antivirals for CMV reactivation of uncertain clinical significance -- would instead repeat CMV quant tomorrow to reassess (contact me with questions about management if not negative)  - diarrhea resolved, no further action needed              Anticipated Disposition: per primary team    Thank you for your consult. I will sign off.  Please contact us if you have any additional questions.     Maribell Eric MD  Transplant ID Attending  [phone]    Maribell Eric MD  Infectious Disease  [facility]    Subjective:     Principal Problem:Fever    HPI:     Interval History: Diarrhea resolved with cessation of laxatives. Remains afebrile. Cough non-productive. BAL cx NGTD.    Review of Systems   Constitutional: Negative for activity change, appetite change, chills, fatigue, fever and unexpected weight change.   HENT: Negative for congestion, postnasal drip, sinus pressure and sore throat.    Eyes: Negative for visual disturbance.   Respiratory: Positive for cough. Negative for shortness of breath and wheezing.    Cardiovascular: Negative for chest pain, palpitations and leg swelling.   Gastrointestinal: Negative for abdominal distention, abdominal pain, diarrhea, nausea and vomiting.   Genitourinary: Positive for decreased urine volume and enuresis.   Musculoskeletal: Negative for arthralgias and myalgias.   Skin: Negative for rash.   Allergic/Immunologic: Positive for immunocompromised state.   Neurological: Negative for dizziness, syncope, weakness, light-headedness and headaches.   Psychiatric/Behavioral: Negative for confusion and decreased concentration.     Objective:     Vital Signs (Most Recent):  Temp: 98.1 °F (36.7 °C) (06/03/18 1500)  Pulse: 105 (06/03/18 1600)  Resp: (!) 22 (06/03/18 1600)  BP: 117/72 (06/03/18 1600)  SpO2: 100 % (06/03/18 1600) Vital Signs (24h Range):  Temp:  [97.7 °F (36.5 °C)-98.8 °F (37.1 °C)] 98.1 °F (36.7 °C)  Pulse:  [105-118] 105  Resp:  [16-28] 22  SpO2:  [84 %-100 %] 100 %  BP: ()/(49-77) 117/72     Weight: 74 kg (163 lb 2.3 oz)  Body mass index is 25.55 kg/m².    Estimated Creatinine Clearance: 58.8 mL/min (A) (based on SCr of 1.5 mg/dL (H)).    Physical Exam   Constitutional: He is oriented to person, place, and time. No distress.   Appears chronically ill   HENT:   Head:  Normocephalic and atraumatic.   Right Ear: External ear normal.   Left Ear: External ear normal.   Eyes: EOM are normal. Pupils are equal, round, and reactive to light.   Neck: Normal range of motion. Neck supple.   Cardiovascular: Normal rate, regular rhythm, normal heart sounds and intact distal pulses.    No murmur heard.  Pulmonary/Chest: Effort normal. No respiratory distress. He has no wheezes.   Decreased in bases (chronic effusion). On room air.   Abdominal: Soft. Bowel sounds are normal. He exhibits no distension. There is no tenderness.   Musculoskeletal: Normal range of motion.   Neurological: He is alert and oriented to person, place, and time. No cranial nerve deficit.   Skin: Skin is warm and dry. No rash noted. He is not diaphoretic.   Psychiatric: He has a normal mood and affect.       Significant Labs:   CBC:     Recent Labs  Lab 06/02/18  0441 06/03/18  0455   WBC 4.22 4.39   HGB 8.2* 8.3*   HCT 26.7* 27.4*    178     CMP:     Recent Labs  Lab 06/02/18  0441 06/03/18  0455    139   K 4.5 3.8    106   CO2 24 25   GLU 75 145*   BUN 10 9   CREATININE 1.6* 1.5*   CALCIUM 8.6* 8.4*   PROT 5.5* 5.4*   ALBUMIN 2.0* 2.1*   BILITOT 0.6 0.5   ALKPHOS 137* 125   AST 16 15   ALT 12 10   ANIONGAP 8 8   EGFRNONAA 51.6* 55.8*       Significant Imaging: I have reviewed all pertinent imaging results/findings within the past 24 hours.     Microbiology:  5/29 blood cx: negative  5/29 RVP negative  5/29 CMV quant: positive but <137  6/1 BAL cx: NGTD  6/1 C.diff: discordant, PCR negative

## 2018-06-03 NOTE — PROGRESS NOTES
[facility]  Heart Transplant  Progress Note    Patient Name: Lv Crocker  MRN: [id number]  Admission Date: 5/29/2018  Hospital Length of Stay: 5 days  Attending Physician: Heriberto Rosa MD  Primary Care Provider: Philippe Mohr MD  Principal Problem:Fever    Subjective:     Interval History:  No acute overnight event. Diarrhea getting better. c-diff negative. Afebrile for > 48hrs now. BAL romero still in progress. Vanco trough noted to be high this morning. Denies abd pain, SOB.    Continuous Infusions:  Scheduled Meds:   sodium chloride 0.9%   Intravenous Once    aspirin  81 mg Oral Daily    ceFEPime (MAXIPIME) IVPB  1 g Intravenous Q12H    cetirizine  5 mg Oral Daily    enoxaparin  30 mg Subcutaneous Daily    epoetin jose miguel (PROCRIT) injection  10,000 Units Intravenous Every Mon, Wed, Fri    escitalopram oxalate  20 mg Oral Daily    gabapentin  100 mg Oral TID    insulin detemir U-100  8 Units Subcutaneous BID    levothyroxine  137 mcg Oral Before breakfast    midodrine  10 mg Oral TID WM    mycophenolate  250 mg Oral BID    pantoprazole  40 mg Oral Daily    potassium, sodium phosphates  1 packet Oral QID (AC & HS)    pravastatin  40 mg Oral Daily    predniSONE  2.5 mg Oral Daily    QUEtiapine  50 mg Oral QHS    tacrolimus  5 mg Oral BID     PRN Meds:sodium chloride, sodium chloride 0.9%, acetaminophen, ALPRAZolam, benzonatate, dextrose 50%, dextrose 50%, fentaNYL, glucagon (human recombinant), glucose, glucose, insulin aspart U-100, levalbuterol, lidocaine HCL 2%, midazolam, midazolam, ondansetron, oxyCODONE, promethazine (PHENERGAN) IVPB    Review of patient's allergies indicates:   Allergen Reactions    No known drug allergies      Objective:     Vital Signs (Most Recent):  Temp: 97.7 °F (36.5 °C) (06/03/18 1100)  Pulse: (!) 113 (06/03/18 1200)  Resp: 17 (06/03/18 1200)  BP: 91/67 (06/03/18 1200)  SpO2: 100 % (06/03/18 1200) Vital Signs (24h Range):  Temp:  [97.7  °F (36.5 °C)-98.8 °F (37.1 °C)] 97.7 °F (36.5 °C)  Pulse:  [106-121] 113  Resp:  [16-33] 17  SpO2:  [84 %-100 %] 100 %  BP: ()/(49-78) 91/67     Patient Vitals for the past 72 hrs (Last 3 readings):   Weight   06/03/18 0400 74 kg (163 lb 2.3 oz)   06/02/18 0400 73.4 kg (161 lb 13.1 oz)   06/01/18 0400 73 kg (160 lb 15 oz)     Body mass index is 25.55 kg/m².      Intake/Output Summary (Last 24 hours) at 06/03/18 1225  Last data filed at 06/03/18 1200   Gross per 24 hour   Intake              620 ml   Output              500 ml   Net              120 ml       Hemodynamic Parameters:       Telemetry: no event noted    Physical Exam     Constitutional: He is oriented to person, place, and time.    very comfortable on bed this morning  Eyes: Pupils are equal, round, and reactive to light.   Neck: No JVD present.   Cardiovascular: Normal rate and regular rhythm.    No murmur heard.  Tachycardia present   Pulmonary/Chest: Effort normal and breath sounds normal.   Abdominal: Soft. Bowel sounds are normal.   No fluid wave   Musculoskeletal: He exhibits no edema or tenderness.   Neurological: He is alert and oriented to person, place, and time.   Skin: Skin is warm and dry. Dry gangrene to toes bilaterally. Left great toe gangrene improved and less tender. No drainage  Psychiatric: mood and affect not evaluated but grossly normal  Nursing note reviewed.    Significant Labs:  CBC:    Recent Labs  Lab 06/01/18  0322 06/02/18  0441 06/03/18  0455   WBC 4.51 4.22 4.39   RBC 2.62* 2.73* 2.73*   HGB 7.8* 8.2* 8.3*   HCT 25.2* 26.7* 27.4*    168 178   MCV 96 98 100*   MCH 29.8 30.0 30.4   MCHC 31.0* 30.7* 30.3*     BNP:    Recent Labs  Lab 05/29/18  0303   BNP 1,228*     CMP:    Recent Labs  Lab 05/31/18  0300  06/01/18  0322 06/02/18  0441 06/03/18  0455   *  < > 88 75 145*   CALCIUM 8.4*  < > 8.1* 8.6* 8.4*   ALBUMIN 2.1*  < > 2.1* 2.0* 2.1*   PROT 5.4*  --   --  5.5* 5.4*     < > 141 140 139   K 4.2  < >  4.4 4.5 3.8   CO2 24  < > 26 24 25     < > 108 108 106   BUN 26*  < > 7 10 9   CREATININE 3.0*  < > 1.0 1.6* 1.5*   ALKPHOS 136*  --   --  137* 125   ALT 11  --   --  12 10   AST 15  --   --  16 15   BILITOT 0.5  --   --  0.6 0.5   < > = values in this interval not displayed.   Coagulation:   No results for input(s): PT, INR, APTT in the last 168 hours.  LDH:  No results for input(s): LDH in the last 72 hours.  Microbiology:  Microbiology Results (last 7 days)     Procedure Component Value Units Date/Time    Blood culture #2 **CANNOT BE ORDERED STAT** [[id number]] Collected:  05/29/18 0319    Order Status:  Completed Specimen:  Blood from Peripheral, Hand, Left Updated:  06/03/18 0612     Blood Culture, Routine No growth after 5 days.    Blood culture #1 **CANNOT BE ORDERED STAT** [[id number]] Collected:  05/29/18 0303    Order Status:  Completed Specimen:  Blood from Peripheral, Wrist, Right Updated:  06/03/18 0612     Blood Culture, Routine No growth after 5 days.    AFB Culture & Smear [[id number]] Collected:  06/01/18 1157    Order Status:  Completed Specimen:  Body Fluid from Lung, Lingula Updated:  06/02/18 2127     AFB Culture & Smear Culture in progress    Culture, Respiratory [[id number]] Collected:  06/01/18 1156    Order Status:  Completed Specimen:  Respiratory from BAL, LLL Updated:  06/02/18 1136     Respiratory Culture No Growth     Gram Stain (Respiratory) <10 epithelial cells per low power field.     Gram Stain (Respiratory) Rare WBC's     Gram Stain (Respiratory) No organisms seen    C Diff Toxin by PCR [[id number]] Collected:  06/01/18 1514    Order Status:  Completed Updated:  06/02/18 0404     C. diff PCR Negative    Clostridium difficile EIA [[id number]]  (Abnormal) Collected:  06/01/18 1514    Order Status:  Completed Specimen:  Stool from Stool Updated:  06/02/18 0319     C. diff Antigen Positive (A)     C difficile Toxins A+B, EIA Negative     Comment: Testing not recommended for children  <24 months old.       KOH prep [[id number]] Collected:  06/01/18 1157    Order Status:  Completed Specimen:  Body Fluid from Lung, Lingula Updated:  06/01/18 1731     KOH Prep No yeast or fungal elements seen    Narrative:       Bronchial Wash    Respiratory Viral Panel by PCR [facility]; Sputum (BAL) [[id number]] Collected:  06/01/18 1157    Order Status:  Sent Specimen:  Nasopharyngeal Updated:  06/01/18 1458    Fungus culture [[id number]] Collected:  06/01/18 1157    Order Status:  Sent Specimen:  Body Fluid from Lung, Lingula Updated:  06/01/18 1458    Culture, Respiratory with Gram Stain [[id number]] Collected:  06/01/18 1156    Order Status:  Canceled Specimen:  Respiratory from BAL, LLL Updated:  06/01/18 1457    Gram stain [[id number]] Collected:  06/01/18 1157    Order Status:  Canceled Specimen:  Body Fluid from Lung, Lingula Updated:  06/01/18 1456    Cryptococcal antigen [[id number]] Collected:  05/30/18 1222    Order Status:  Completed Specimen:  Blood from Blood Updated:  05/31/18 1228     Cryptococcal Ag, Blood Negative    Respiratory Viral Panel by PCR [facility]; Nasal Swab [[id number]] Collected:  05/29/18 2333    Order Status:  Completed Specimen:  Respiratory Updated:  05/31/18 1042     Respiratory Virus Panel, source Nasal Swab     RVP - Adenovirus Not Detected     Comment: Detects Serotypes B and E. Detection of Serotype C may   be limited. If Adenovirus infection is suspected and a   Not Detected result is returned the sample should be   re-tested for Adenovirus using an independent method  (e.g. [employer] Adenovirus Quantitative Real-Time  PCR test.          Enterovirus Not Detected     Comment: Cross-reactivity has been observed between certain Rhinovirus  strains and the Enterovirus assay.          Human Bocavirus Not Detected     Human Coronavirus Not Detected     Comment: The Human Coronavirus assay detects Human coronavirus types  229E, OC43,NL63 and HKU1.          RVP - Human Metapneumovirus  (hMPV) Not Detected     RVP - Influenza A Not Detected     Influenza A - G6N7-02 Not Detected     RVP - Influenza B Not Detected     Parainfluenza Not Detected     Respiratory Syncytial VirusVirus (RSV) A Not Detected     Comment: The Respiratory Syncytial Viral assay detects types A and B,  however it does not distinguish between the two.          RVP - Rhinovirus Not Detected     Comment: Cross-Reactivity has been observed between certain   Rhinovirus strains and the Enterovirus assay.  Target Enriched Mulitplex Polymerase Chain Reaction (TEM-PCR)  allows for the detection of multiple pathogens out of a single  reaction.  This test was developed and its performance   characteristics determined by [employer].  It has not   been cleared or approved by the U.S.Food and Drug Administration.  Results should be used in conjunction with clinical findings,   and should not form the sole basis for a diagnosis or treatment  decision.  TEM-PCR is a licensed technology of [employer].         Narrative:       Receiving Lab:->[facility]    Respiratory Viral Panel by PCR [facility]; Sputum [[id number]]     Order Status:  Canceled Specimen:  Respiratory           I have reviewed all pertinent labs within the past 24 hours.    Estimated Creatinine Clearance: 58.8 mL/min (A) (based on SCr of 1.5 mg/dL (H)).    Diagnostic Results:        Assessment and Plan:     41 yo male S/P OHTx 11/18/2014, suspected restrictive versus constrictive CMP post-transplant as well as CKD stage IV now progressed to ESRD requiring HD M, W, F, Recent long hospitalization due to septic shock, RSV who presents from inpatient rehab with fever going on for last 3-4 days.Patient was discharged on 5/18/2018 and has been in rehab since-working with PT.Patient's wife is at bedside and provides most of the history-Patient is drowsy from seroquel per wife. She denies him having any SOB, chest pain, diarrhea, nausea, vomiting. He doesnot make any  urine since he has been non HD. Fever was noted to be as high as 101.6.She also notes that he has been coughing since being in ER but not before that.No SOB with PT at rehab.No dizziness or syncope. Wife reports he got an extra HD on Thursday because nephrology wanted to remove extra fluid.He has had a good appetite and tolerating PO and per wife has been working with PT and getting stronger. He has finished his abx course from last visit already.    * Fever    - Documented on Day two of stay (tmax 102.4)  - possibly from Pneumonia  - afebrile for > 48 hrs  - Blood Cultures and resp panel continues to be  Negative including RSV  - CMV weakly positive  - Bronch cultures in progress.   - CT scan  On admission + for congestion but equivocal for pneumonia. Present of moderate R- pleural effusion  - No leukocytosis but immunocompromised   - patient is anuric  - Continue cefepime and vanco per ID. If BAL results negative, can d/c vanco  - Vanco trough noted to be elevated this morning. Repeat level again tomorrow          Physical debility    - secondary to prolonged illness  - PT/OT in patient and d/c back to rehab once cleared          Heart transplanted    -S/p OHT 2014  -Continue prednisone and cellcept and tacrolimus  -Tacro level 5.3 today  -continue tacro 5/5 BID from yesterday. Goal is 5-10  -Plan to adjust today as needed        Diarrhea of presumed infectious origin    - improving. Possibly iatrogenic  - could be from current abx  - could also be CMV reactivation recurrence but no plan for treatment now until repeat level next week  - C- diff negative  - discontinue stool motility agents and d/c vancomycin  - Discussed with ID (Dr. Eric)        Pulmonary infiltrates on CXR    - Had bronch yesterday  - cultures from BAL in progress.   -Continue cefepime and vanco        Depression    - continue current at home depression regimen.  - will consider psych if necessary          Gangrene    - Dry gangrene developed  previous admission with pressor use  - Will continue with betadine rx as current.   - Podiatry on board  - Foot XR and dopplers shows no changes concerning for infection          ESRD (end stage renal disease)    - on HD M, W, F  - BNP elevated > 1200 on admission  - continue HD as scheduled. Appreciate nephrology assistance        Pleural effusion    Persistent right sided pleural effusion  Ct chest show moderate amount.   S/P bronch and lavage today (6/1/20180  Fu on results and pulmonary recomendation        Type 1 diabetes mellitus with stage 3 chronic kidney disease    Insulin sliding scale  -continue detemir  - endocrine consult        Hypothyroidism due to Hashimoto's thyroiditis    - continue synthroid at home dose  - TSH 12.4, T4 4.4   - subclinical hypothyroidism.         Anemia    - hb 8-9 at time of discharge.   - trending down after admission possibly from fq blood draws compounded by current/chronic infection.  - Prior iron labs shows adequate iron  - stable currently after 1PRBC on 5/31          Discussed plan of care with Dr. Shelley Walsh MD  Heart Transplant  [facility]

## 2018-06-03 NOTE — PLAN OF CARE
Problem: Patient Care Overview  Goal: Plan of Care Review  Outcome: Ongoing (interventions implemented as appropriate)    Neuro: AAOx4. Flat affect. Able to follow all commands. Pupils are equal and reactive. Denies any numbness or tingling in all 4 extremities.       Pulmonary: Breath sounds are clear with diminished bases but no signs of distress. RA sats 100%     Cardiovascular: Sinus tach. Drop in MAP to 56. Notified Dr. Muniz. Doctor ordered to be notified if MAP reaches less than 55.       Gastrointestinal: Cardiac diet. All 4 bowel sounds are active. Denies any abdominal pain or tenderness. 3 BMs overnight.      Genitourinary: Anuric.      Endocrine: CBG = 166. SSI given accordingly.     Integumentary/Other: Left and right big toe, right 2nd and third toe are black. Patient denies any numbness or tingling. Patient has 2 stage 2 pressure ulcers on coccyx. Applied triad cream.      Bed in low, locked position, call light within reach, side rails up x3. Gave incontinence bath and changed linens. Will continue to monitor.

## 2018-06-03 NOTE — ASSESSMENT & PLAN NOTE
- improving. Possibly iatrogenic  - could be from current abx  - could also be CMV reactivation recurrence but no plan for treatment now until repeat level next week  - C- diff negative  - discontinue stool motility agents and d/c vancomycin  - Discussed with ID (Dr. Eric)

## 2018-06-03 NOTE — PROGRESS NOTES
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 with recurrent pleural effusions/ascites requiring monthly thoracenteses/paracenteses (last paracentesis 5/15/18 3.5 L), VATS 1/11/18 with Pleur-X catheter (now removed), and recent admission for diarrhea and RSV complicated with respiratory failure requiring intubation/trach/PEG (both Trach/PEG removed 5/10) and CACHORRO on superimposed on CKD stage 4 secondary to prograft toxcity?/ischemic ATN (see last admission consult 3/11-5/18/18-discharge to rehab) now dialysis dependent via [facility] (since 3/14/2018) who is admitted from [facility] for fever 100-101.6 since Friday night. Denies PND, rhinorrhea, cough or shortness of breath. Wife reports doing well at Rehab up -transfers and parallel bars.  Eating well since appetite stimulator started at rehab. Had HD 4 days last week MWTF due to volume excess. Last HD yesterday 3 L removed. Denies L IJ site tenderness.     CT chest wo contrast showed patchy subsegmental opacities through out lungs, mod volume right sided pleural effusion with trace left side pleural fluid and persistent round RLL opacity, new mod t12 compression deformity, and small volume abdominal ascites. Lactate 0.9. Blood cultures collected in ED. Vancomycin started in ED. SBP , Tacy 111's,  % on 2 L NC.     Interval History:   Patient evaluated at bedside, bowel movement have decreased only made 1 yesterday. Had HD yesterday for clearance and was well tolerated.     Review of patient's allergies  indicates:   Allergen Reactions    No known drug allergies      Current Facility-Administered Medications   Medication Frequency    0.9%  NaCl infusion (for blood administration) Q24H PRN    0.9%  NaCl infusion PRN    0.9%  NaCl infusion Once    acetaminophen tablet 650 mg Q6H PRN    ALPRAZolam tablet 0.5 mg Q6H PRN    aspirin EC tablet 81 mg Daily    benzonatate capsule 100 mg TID PRN    ceFEPIme injection 1 g Q12H    cetirizine tablet 5 mg Daily    dextrose 50% injection 12.5 g PRN    dextrose 50% injection 25 g PRN    enoxaparin injection 30 mg Daily    epoetin jose miguel injection 10,000 Units Every Mon, Wed, Fri    escitalopram oxalate tablet 20 mg Daily    fentaNYL injection 100 mcg PRN    gabapentin capsule 100 mg TID    glucagon (human recombinant) injection 1 mg PRN    glucose chewable tablet 16 g PRN    glucose chewable tablet 24 g PRN    insulin aspart U-100 pen 1-10 Units QID (AC + HS) PRN    insulin detemir U-100 pen 8 Units BID    levalbuterol nebulizer solution 0.63 mg Q4H PRN    levothyroxine tablet 137 mcg Before breakfast    lidocaine HCL 2% jelly PRN    midazolam (VERSED) 1 mg/mL injection 10 mg On Call Procedure    midazolam (VERSED) 1 mg/mL injection 4 mg PRN    midodrine tablet 10 mg TID WM    mycophenolate capsule 250 mg BID    ondansetron disintegrating tablet 8 mg Q8H PRN    oxyCODONE immediate release tablet 5 mg Q6H PRN    pantoprazole EC tablet 40 mg Daily    potassium, sodium phosphates 280-160-250 mg packet 1 packet QID (AC & HS)    pravastatin tablet 40 mg Daily    predniSONE tablet 2.5 mg Daily    promethazine (PHENERGAN) 12.5 mg in dextrose 5 % 50 mL IVPB Q6H PRN    QUEtiapine tablet 50 mg QHS    tacrolimus capsule 5 mg BID       Objective:     Vital Signs (Most Recent):  Temp: 98.1 °F (36.7 °C) (06/03/18 0700)  Pulse: (!) 112 (06/03/18 0800)  Resp: 17 (06/03/18 0800)  BP: (!) 84/50 (06/03/18 0800)  SpO2: 100 % (06/03/18 0800)  O2 Device (Oxygen  Therapy): room air (06/03/18 0800) Vital Signs (24h Range):  Temp:  [98 °F (36.7 °C)-98.8 °F (37.1 °C)] 98.1 °F (36.7 °C)  Pulse:  [106-121] 112  Resp:  [16-33] 17  SpO2:  [84 %-100 %] 100 %  BP: ()/(49-78) 84/50     Weight: 74 kg (163 lb 2.3 oz) (06/03/18 0400)  Body mass index is 25.55 kg/m².  Body surface area is 1.87 meters squared.    I/O last 3 completed shifts:  In: 735 [I.V.:185; Other:500; IV Piggyback:50]  Out: 500 [Other:500]    Physical Exam   Constitutional: He is oriented to person, place, and time. He appears well-developed. He appears ill. No distress. Nasal cannula in place.   HENT:   Head: Normocephalic and atraumatic.   Right Ear: External ear normal.   Left Ear: External ear normal.   Eyes: Conjunctivae and EOM are normal. Right eye exhibits no discharge. Left eye exhibits no discharge.   Neck: Normal range of motion. Neck supple.   Cardiovascular: Regular rhythm.  Tachycardia present.    Pulmonary/Chest: Effort normal. No respiratory distress. He has no wheezes. He has rales.   Abdominal: Soft. Bowel sounds are normal. He exhibits no distension. There is no tenderness.   Musculoskeletal: He exhibits edema.   Neurological: He is alert and oriented to person, place, and time.   Skin: Skin is warm and dry. He is not diaphoretic.   -L IJ Permacath   -Right necrotic toes 1-3-similar to previous    Psychiatric: He has a normal mood and affect.       Significant Labs:  ABGs: No results for input(s): PH, PCO2, HCO3, POCSATURATED, BE in the last 168 hours.  BMP:   Recent Labs  Lab 06/03/18  0455   *      CO2 25   BUN 9   CREATININE 1.5*   CALCIUM 8.4*   MG 1.9     CBC:   Recent Labs  Lab 06/03/18  0455   WBC 4.39   RBC 2.73*   HGB 8.3*   HCT 27.4*      *   MCH 30.4   MCHC 30.3*     CMP:   Recent Labs  Lab 06/03/18  0455   *   CALCIUM 8.4*   ALBUMIN 2.1*   PROT 5.4*      K 3.8   CO2 25      BUN 9   CREATININE 1.5*   ALKPHOS 125   ALT 10   AST 15    BILITOT 0.5     All labs within the past 24 hours have been reviewed.       Assessment/Plan:     ESRD (end stage renal disease)    CACHORRO on superimposed on CKD stage 4 secondary to prograft toxcity/ischemic ATN (see last admission consult 3/11-5/18/18-discharge to rehab) now dialysis dependent via [facility] (since 3/14/2018) who is admitted from [facility] for fever 100-101.6 since Friday night. Last HD yesterday 5/28 3 L removed.   -L IJ permacath no overt signs of tunnel or exit site infection.       Plan/recommendations:  -No need for dialysis today will reassess tomorrow if require  -Continue DAQUAN Q MWF             Edson Soriano  Nephrology  Fellow  [facility]    Pager [phone]

## 2018-06-03 NOTE — ASSESSMENT & PLAN NOTE
41yo man w/a history of DM1, Hashimoto's thyroiditis, and ICM (s/p OHT 11/18/2014, CMV D+/R+, steroid induction, on maintenance tacro/MMF/pred; c/b early hemorrhagic tamponade requiring washout, delayed closure, and pericardial window and subsequent AF w/RVR, and CACHORRO; late course c/b ABMR 4/2015 s/p rituxan, PLEX, and IVIG; subsequent C.diff/CMV reactivation; restrictive cardiomyopathy with recurrent pleural effusions s/p VATS/pleurex catheter 1/2018 and ascites requiring repeated LVP; and a prolonged admission in 3/2018 due to RSV-A multifocal pneumonia with superimposed bacterial pneumonia c/b prolonged hypoxic RF s/p trach/PEG now removed, progression of CKD to ESRD on iHD, and DIC) who was admitted on 5/29/2018 with acute onset fevers, hypoxia, and multifocal patchy pulmonary opacities due to presumptive bacterial pneumonia (CAP vs HCAP). He is improved on empiric vanc/cefepime. 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      - would stop vanc and cefepime and complete 7 day course of antibiotics for HCAP with moxifloxacin  - BAL cultures and fungal markers negative to date -- please recontact me if surprising growth  - would hold off on starting antivirals for CMV reactivation of uncertain clinical significance -- would instead repeat CMV quant tomorrow to reassess (contact me with questions about management if not negative)  - diarrhea resolved, no further action needed

## 2018-06-03 NOTE — SUBJECTIVE & OBJECTIVE
Interval History: Diarrhea resolved with cessation of laxatives. Remains afebrile. Cough non-productive. BAL cx NGTD.    Review of Systems   Constitutional: Negative for activity change, appetite change, chills, fatigue, fever and unexpected weight change.   HENT: Negative for congestion, postnasal drip, sinus pressure and sore throat.    Eyes: Negative for visual disturbance.   Respiratory: Positive for cough. Negative for shortness of breath and wheezing.    Cardiovascular: Negative for chest pain, palpitations and leg swelling.   Gastrointestinal: Negative for abdominal distention, abdominal pain, diarrhea, nausea and vomiting.   Genitourinary: Positive for decreased urine volume and enuresis.   Musculoskeletal: Negative for arthralgias and myalgias.   Skin: Negative for rash.   Allergic/Immunologic: Positive for immunocompromised state.   Neurological: Negative for dizziness, syncope, weakness, light-headedness and headaches.   Psychiatric/Behavioral: Negative for confusion and decreased concentration.     Objective:     Vital Signs (Most Recent):  Temp: 98.1 °F (36.7 °C) (06/03/18 1500)  Pulse: 105 (06/03/18 1600)  Resp: (!) 22 (06/03/18 1600)  BP: 117/72 (06/03/18 1600)  SpO2: 100 % (06/03/18 1600) Vital Signs (24h Range):  Temp:  [97.7 °F (36.5 °C)-98.8 °F (37.1 °C)] 98.1 °F (36.7 °C)  Pulse:  [105-118] 105  Resp:  [16-28] 22  SpO2:  [84 %-100 %] 100 %  BP: ()/(49-77) 117/72     Weight: 74 kg (163 lb 2.3 oz)  Body mass index is 25.55 kg/m².    Estimated Creatinine Clearance: 58.8 mL/min (A) (based on SCr of 1.5 mg/dL (H)).    Physical Exam   Constitutional: He is oriented to person, place, and time. No distress.   Appears chronically ill   HENT:   Head: Normocephalic and atraumatic.   Right Ear: External ear normal.   Left Ear: External ear normal.   Eyes: EOM are normal. Pupils are equal, round, and reactive to light.   Neck: Normal range of motion. Neck supple.   Cardiovascular: Normal rate, regular  rhythm, normal heart sounds and intact distal pulses.    No murmur heard.  Pulmonary/Chest: Effort normal. No respiratory distress. He has no wheezes.   Decreased in bases (chronic effusion). On room air.   Abdominal: Soft. Bowel sounds are normal. He exhibits no distension. There is no tenderness.   Musculoskeletal: Normal range of motion.   Neurological: He is alert and oriented to person, place, and time. No cranial nerve deficit.   Skin: Skin is warm and dry. No rash noted. He is not diaphoretic.   Psychiatric: He has a normal mood and affect.       Significant Labs:   CBC:     Recent Labs  Lab 06/02/18  0441 06/03/18  0455   WBC 4.22 4.39   HGB 8.2* 8.3*   HCT 26.7* 27.4*    178     CMP:     Recent Labs  Lab 06/02/18  0441 06/03/18  0455    139   K 4.5 3.8    106   CO2 24 25   GLU 75 145*   BUN 10 9   CREATININE 1.6* 1.5*   CALCIUM 8.6* 8.4*   PROT 5.5* 5.4*   ALBUMIN 2.0* 2.1*   BILITOT 0.6 0.5   ALKPHOS 137* 125   AST 16 15   ALT 12 10   ANIONGAP 8 8   EGFRNONAA 51.6* 55.8*       Significant Imaging: I have reviewed all pertinent imaging results/findings within the past 24 hours.     Microbiology:  5/29 blood cx: negative  5/29 RVP negative  5/29 CMV quant: positive but <137  6/1 BAL cx: NGTD  6/1 C.diff: discordant, PCR negative

## 2018-06-04 NOTE — PROGRESS NOTES
[facility]  Heart Transplant  Progress Note    Patient Name: Lv Crocker  MRN: [id number]  Admission Date: 5/29/2018  Hospital Length of Stay: 6 days  Attending Physician: Heriberto Rosa MD  Primary Care Provider: Philippe Mohr MD  Principal Problem:Fever    Subjective:     Interval History: No acute overnight event. Wants to get back to rehab.    Scheduled Meds:   sodium chloride 0.9%   Intravenous Once    aspirin  81 mg Oral Daily    ceFEPime (MAXIPIME) IVPB  1 g Intravenous Q12H    cetirizine  5 mg Oral Daily    enoxaparin  30 mg Subcutaneous Daily    epoetin jose miguel (PROCRIT) injection  10,000 Units Intravenous Every Mon, Wed, Fri    escitalopram oxalate  20 mg Oral Daily    gabapentin  100 mg Oral TID    insulin detemir U-100  6 Units Subcutaneous BID    levothyroxine  137 mcg Oral Before breakfast    midodrine  10 mg Oral TID WM    mycophenolate  250 mg Oral BID    pantoprazole  40 mg Oral Daily    pravastatin  40 mg Oral Daily    predniSONE  2.5 mg Oral Daily    QUEtiapine  50 mg Oral QHS    tacrolimus  5 mg Oral BID     PRN Meds:sodium chloride, sodium chloride 0.9%, acetaminophen, ALPRAZolam, benzonatate, dextrose 50%, dextrose 50%, fentaNYL, glucagon (human recombinant), glucose, glucose, insulin aspart U-100, levalbuterol, lidocaine HCL 2%, midazolam, midazolam, ondansetron, oxyCODONE, promethazine (PHENERGAN) IVPB    Review of patient's allergies indicates:   Allergen Reactions    No known drug allergies      Objective:     Vital Signs (Most Recent):  Temp: 98.1 °F (36.7 °C) (06/04/18 0500)  Pulse: 100 (06/04/18 0701)  Resp: 15 (06/04/18 0701)  BP: (!) 84/52 (06/04/18 0701)  SpO2: 100 % (06/04/18 0701) Vital Signs (24h Range):  Temp:  [97.7 °F (36.5 °C)-98.3 °F (36.8 °C)] 98.1 °F (36.7 °C)  Pulse:  [100-115] 100  Resp:  [10-30] 15  SpO2:  [90 %-100 %] 100 %  BP: ()/(50-88) 84/52     Patient Vitals for the past 72 hrs (Last 3 readings):   Weight    06/04/18 0400 73.3 kg (161 lb 9.6 oz)   06/03/18 0400 74 kg (163 lb 2.3 oz)   06/02/18 0400 73.4 kg (161 lb 13.1 oz)     Body mass index is 25.31 kg/m².      Intake/Output Summary (Last 24 hours) at 06/04/18 0747  Last data filed at 06/04/18 0701   Gross per 24 hour   Intake              120 ml   Output                0 ml   Net              120 ml          Telemetry: no event noted    Physical Exam   Constitutional: He is oriented to person, place, and time. He appears well-developed and well-nourished.   HENT:   Head: Normocephalic.   Eyes: Pupils are equal, round, and reactive to light.   Neck: Normal range of motion. Neck supple.   Cardiovascular: Normal rate and regular rhythm.    Pulmonary/Chest: Effort normal.   Decreased BLL   Abdominal: Soft. Bowel sounds are normal.   Musculoskeletal: Normal range of motion.   Neurological: He is alert and oriented to person, place, and time.   Skin: Skin is warm and dry.   Psychiatric: He has a normal mood and affect. His behavior is normal.   Nursing note and vitals reviewed.     Significant Labs:  CBC:    Recent Labs  Lab 06/02/18  0441 06/03/18  0455 06/04/18  0509   WBC 4.22 4.39 5.28   RBC 2.73* 2.73* 2.79*   HGB 8.2* 8.3* 8.4*   HCT 26.7* 27.4* 28.1*    178 230   MCV 98 100* 101*   MCH 30.0 30.4 30.1   MCHC 30.7* 30.3* 29.9*     BNP:    Recent Labs  Lab 05/29/18  0303   BNP 1,228*     CMP:    Recent Labs  Lab 06/02/18  0441 06/03/18  0455 06/04/18  0509   GLU 75 145* 138*   CALCIUM 8.6* 8.4* 8.7   ALBUMIN 2.0* 2.1* 2.2*   PROT 5.5* 5.4* 5.9*    139 138   K 4.5 3.8 4.4   CO2 24 25 19*    106 105   BUN 10 9 15   CREATININE 1.6* 1.5* 2.9*   ALKPHOS 137* 125 119   ALT 12 10 11   AST 16 15 32   BILITOT 0.6 0.5 0.4      Coagulation:   No results for input(s): PT, INR, APTT in the last 168 hours.  LDH:  No results for input(s): LDH in the last 72 hours.  Microbiology:  Microbiology Results (last 7 days)     Procedure Component Value Units Date/Time     Blood culture #2 **CANNOT BE ORDERED STAT** [[id number]] Collected:  05/29/18 0319    Order Status:  Completed Specimen:  Blood from Peripheral, Hand, Left Updated:  06/03/18 0612     Blood Culture, Routine No growth after 5 days.    Blood culture #1 **CANNOT BE ORDERED STAT** [[id number]] Collected:  05/29/18 0303    Order Status:  Completed Specimen:  Blood from Peripheral, Wrist, Right Updated:  06/03/18 0612     Blood Culture, Routine No growth after 5 days.    AFB Culture & Smear [[id number]] Collected:  06/01/18 1157    Order Status:  Completed Specimen:  Body Fluid from Lung, Lingula Updated:  06/02/18 2127     AFB Culture & Smear Culture in progress    Culture, Respiratory [[id number]] Collected:  06/01/18 1156    Order Status:  Completed Specimen:  Respiratory from BAL, LLL Updated:  06/02/18 1136     Respiratory Culture No Growth     Gram Stain (Respiratory) <10 epithelial cells per low power field.     Gram Stain (Respiratory) Rare WBC's     Gram Stain (Respiratory) No organisms seen    C Diff Toxin by PCR [[id number]] Collected:  06/01/18 1514    Order Status:  Completed Updated:  06/02/18 0404     C. diff PCR Negative    Clostridium difficile EIA [[id number]]  (Abnormal) Collected:  06/01/18 1514    Order Status:  Completed Specimen:  Stool from Stool Updated:  06/02/18 0319     C. diff Antigen Positive (A)     C difficile Toxins A+B, EIA Negative     Comment: Testing not recommended for children <24 months old.       KOH prep [[id number]] Collected:  06/01/18 1157    Order Status:  Completed Specimen:  Body Fluid from Lung, Lingula Updated:  06/01/18 1731     KOH Prep No yeast or fungal elements seen    Narrative:       Bronchial Wash    Respiratory Viral Panel by PCR [facility]; Sputum (BAL) [[id number]] Collected:  06/01/18 1157    Order Status:  Sent Specimen:  Nasopharyngeal Updated:  06/01/18 1458    Fungus culture [[id number]] Collected:  06/01/18 1157    Order Status:  Sent Specimen:  Body Fluid from Lung,  [place]ingula Updated:  06/01/18 1458    Culture, Respiratory with Gram Stain [[id number]] Collected:  06/01/18 1156    Order Status:  Canceled Specimen:  Respiratory from BAL, LLL Updated:  06/01/18 1457    Gram stain [[id number]] Collected:  06/01/18 1157    Order Status:  Canceled Specimen:  Body Fluid from Lung, Lingula Updated:  06/01/18 1456    Cryptococcal antigen [[id number]] Collected:  05/30/18 1222    Order Status:  Completed Specimen:  Blood from Blood Updated:  05/31/18 1228     Cryptococcal Ag, Blood Negative    Respiratory Viral Panel by PCR [facility]; Nasal Swab [[id number]] Collected:  05/29/18 2333    Order Status:  Completed Specimen:  Respiratory Updated:  05/31/18 1042     Respiratory Virus Panel, source Nasal Swab     RVP - Adenovirus Not Detected     Comment: Detects Serotypes B and E. Detection of Serotype C may   be limited. If Adenovirus infection is suspected and a   Not Detected result is returned the sample should be   re-tested for Adenovirus using an independent method  (e.g. [employer] Adenovirus Quantitative Real-Time  PCR test.          Enterovirus Not Detected     Comment: Cross-reactivity has been observed between certain Rhinovirus  strains and the Enterovirus assay.          Human Bocavirus Not Detected     Human Coronavirus Not Detected     Comment: The Human Coronavirus assay detects Human coronavirus types  229E, OC43,NL63 and HKU1.          RVP - Human Metapneumovirus (hMPV) Not Detected     RVP - Influenza A Not Detected     Influenza A - D3O6-60 Not Detected     RVP - Influenza B Not Detected     Parainfluenza Not Detected     Respiratory Syncytial VirusVirus (RSV) A Not Detected     Comment: The Respiratory Syncytial Viral assay detects types A and B,  however it does not distinguish between the two.          RVP - Rhinovirus Not Detected     Comment: Cross-Reactivity has been observed between certain   Rhinovirus strains and the Enterovirus assay.  Target Enriched Mulitplex  Polymerase Chain Reaction (TEM-PCR)  allows for the detection of multiple pathogens out of a single  reaction.  This test was developed and its performance   characteristics determined by [employer].  It has not   been cleared or approved by the U.S.Food and Drug Administration.  Results should be used in conjunction with clinical findings,   and should not form the sole basis for a diagnosis or treatment  decision.  TEM-PCR is a licensed technology of [employer].         Narrative:       Receiving Lab:->[facility]    Respiratory Viral Panel by PCR [facility]; Sputum [[id number]]     Order Status:  Canceled Specimen:  Respiratory         I have reviewed all pertinent labs within the past 24 hours.    Estimated Creatinine Clearance: 30.4 mL/min (A) (based on SCr of 2.9 mg/dL (H)).      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 She denies him having any SOB, chest pain, diarrhea, nausea, vomiting. He doesnot make any urine since he has been non HD. Fever was noted to be as high as 101.6.She also notes that he has been coughing since being in ER but not before that.No SOB with PT at rehab.No dizziness or syncope. Wife reports he got an extra HD on Thursday because nephrology wanted to remove extra fluid.He has had a good appetite and tolerating PO and per wife has been working with PT and getting stronger. He has finished his abx course from last visit already.    * Fever    - Documented on Day two of stay (tmax 102.4).  - Now afebrile.  - CT scan on  admission + for congestion but equivocal for pneumonia. Present of moderate R- pleural effusion.  - Bronch cultures in progress. NGTD.   - Blood Cultures and resp panel continues to be  Negative including RSV.  - CMV weakly positive. Will repeat today per ID recs.   - Will stop cefepime and vanco per ID as he has completed 7 day course.  - Vanco trough check today.         Heart transplanted    -S/p OHT 2014  -Continue prednisone, cellcept and tacrolimus  -continue tacro 5/5 BID. Goal is 5-10  -Plan to adjust today as needed        Physical debility    - secondary to prolonged illness  - PT/OT in patient and d/c back to rehab once cleared          Diarrhea of presumed infectious origin    - improving. Possibly iatrogenic  - could be from current abx  - C- diff negative. Repeating CMV today.   - Discussed with ID (Dr. Eric)        ESRD (end stage renal disease)    - on HD M, W, F.  - continue HD as scheduled. Appreciate nephrology assistance        Gangrene    - Dry gangrene developed previous admission with pressor use  - Will continue with betadine rx as current.   - Podiatry on board  - Foot XR and dopplers shows no changes concerning for infection          Pleural effusion    -Persistent right sided pleural effusion  -Ct chest show moderate amount.   -S/P bronch and lavage (6/1).          Type 1 diabetes mellitus with stage 3 chronic kidney disease    Insulin sliding scale  -continue detemir  - endocrine consult        Pulmonary infiltrates on CXR    - Had bronch yesterday  - cultures from BAL in progress.   -Continue cefepime and vanco        Depression    - continue current at home depression regimen.  - will consider psych if necessary          Hypothyroidism due to Hashimoto's thyroiditis    - continue synthroid at home dose  - TSH 12.4, T4 4.4   - subclinical hypothyroidism.         Uninterrupted Critical Care/Counseling Time (not including procedures): 35m    Tim Mao NP  Heart Transplant  [facility]

## 2018-06-04 NOTE — PLAN OF CARE
Problem: Patient Care Overview  Goal: Plan of Care Review  Outcome: Ongoing (interventions implemented as appropriate)  No acute events this shift. VS and assessment per flow sheets. Plan to step down later this shift. Will continue to monitor.

## 2018-06-04 NOTE — PROGRESS NOTES
[facility]  Nephrology  Progress Note    Patient Name: Lv Crocker  MRN: [id number]  Admission Date: 5/29/2018  Hospital Length of Stay: 6 days  Attending Provider: Jose A Lloyd MD   Primary Care Physician: Philippe Mohr MD  Principal Problem:Fever    Subjective:     HPI: 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 with recurrent pleural effusions/ascites requiring monthly thoracenteses/paracenteses (last paracentesis 5/15/18 3.5 L), VATS 1/11/18 with Pleur-X catheter (now removed), and recent admission for diarrhea and RSV complicated with respiratory failure requiring intubation/trach/PEG (both Trach/PEG removed 5/10) and CACHORRO on superimposed on CKD stage 4 secondary to prograft toxcity?/ischemic ATN (see last admission consult 3/11-5/18/18-discharge to rehab) now dialysis dependent via [facility] (since 3/14/2018) who is admitted from [facility] for fever 100-101.6 since Friday night. Denies PND, rhinorrhea, cough or shortness of breath. Wife reports doing well at Rehab up -transfers and parallel bars.  Eating well since appetite stimulator started at rehab. Had HD 4 days last week MWTF due to volume excess. Last HD yesterday 3 L removed. Denies L IJ site tenderness.     CT chest wo contrast showed patchy subsegmental opacities through out lungs, mod volume right sided pleural effusion with trace left side pleural fluid and persistent round RLL opacity, new mod t12 compression deformity, and small volume abdominal ascites. Lactate 0.9. Blood cultures collected in ED. Vancomycin started in ED. SBP , Tacy 111's,  % on 2 L NC.     Interval History:   NAEON.  He reports resolution of diarrhea.  No complaints on exam, oxygenating well on RA.  Last hemodialysis was on Saturday and tolerated well.  Primary team planning on  step-down today.    Review of patient's allergies indicates:   Allergen Reactions    No known drug allergies      Current Facility-Administered Medications   Medication Frequency    0.9%  NaCl infusion (for blood administration) Q24H PRN    0.9%  NaCl infusion PRN    0.9%  NaCl infusion Once    0.9%  NaCl infusion Once    acetaminophen tablet 650 mg Q6H PRN    ALPRAZolam tablet 0.5 mg Q6H PRN    aspirin EC tablet 81 mg Daily    benzonatate capsule 100 mg TID PRN    ceFEPIme injection 1 g Q12H    cetirizine tablet 5 mg Daily    dextrose 50% injection 12.5 g PRN    dextrose 50% injection 25 g PRN    enoxaparin injection 30 mg Daily    epoetin jose miguel injection 10,000 Units Every Mon, Wed, Fri    escitalopram oxalate tablet 20 mg Daily    fentaNYL injection 100 mcg PRN    gabapentin capsule 100 mg TID    glucagon (human recombinant) injection 1 mg PRN    glucose chewable tablet 16 g PRN    glucose chewable tablet 24 g PRN    insulin aspart U-100 pen 1-10 Units QID (AC + HS) PRN    insulin detemir U-100 pen 6 Units BID    levalbuterol nebulizer solution 0.63 mg Q4H PRN    levothyroxine tablet 137 mcg Before breakfast    lidocaine HCL 2% jelly PRN    midodrine tablet 10 mg TID WM    midodrine tablet 10 mg PRN    mycophenolate capsule 250 mg BID    ondansetron disintegrating tablet 8 mg Q8H PRN    oxyCODONE immediate release tablet 5 mg Q6H PRN    pantoprazole EC tablet 40 mg Daily    pravastatin tablet 40 mg Daily    predniSONE tablet 2.5 mg Daily    promethazine (PHENERGAN) 12.5 mg in dextrose 5 % 50 mL IVPB Q6H PRN    QUEtiapine tablet 50 mg QHS    tacrolimus capsule 5 mg BID       Objective:     Vital Signs (Most Recent):  Temp: 98.9 °F (37.2 °C) (06/04/18 1500)  Pulse: 107 (06/04/18 1500)  Resp: (!) 33 (06/04/18 1500)  BP: 97/62 (06/04/18 1500)  SpO2: (!) 94 % (06/04/18 1516)  O2 Device (Oxygen Therapy): nasal cannula (06/04/18 1516) Vital Signs (24h Range):  Temp:  [98.1 °F (36.7  °C)-98.9 °F (37.2 °C)] 98.9 °F (37.2 °C)  Pulse:  [100-115] 107  Resp:  [10-33] 33  SpO2:  [86 %-100 %] 94 %  BP: ()/(52-88) 97/62     Weight: 73.3 kg (161 lb 9.6 oz) (06/04/18 0400)  Body mass index is 25.31 kg/m².  Body surface area is 1.86 meters squared.    I/O last 3 completed shifts:  In: 175 [I.V.:175]  Out: -     Physical Exam   Constitutional: He is oriented to person, place, and time. He appears well-developed. He appears ill. No distress. Nasal cannula in place.   HENT:   Head: Normocephalic and atraumatic.   Right Ear: External ear normal.   Left Ear: External ear normal.   Eyes: Conjunctivae and EOM are normal. Right eye exhibits no discharge. Left eye exhibits no discharge.   Neck: Normal range of motion. Neck supple.   Cardiovascular: Regular rhythm.  Tachycardia present.    Pulmonary/Chest: Effort normal. No respiratory distress. He has no wheezes. He has no rales.   Abdominal: Soft. Bowel sounds are normal. He exhibits no distension. There is no tenderness.   Musculoskeletal: He exhibits edema.   Neurological: He is alert and oriented to person, place, and time.   Skin: Skin is warm and dry. He is not diaphoretic.   -L IJ Permacath      Psychiatric: He has a normal mood and affect.       Significant Labs:  CBC:   Recent Labs  Lab 06/04/18  0509   WBC 5.28   RBC 2.79*   HGB 8.4*   HCT 28.1*      *   MCH 30.1   MCHC 29.9*     CMP:   Recent Labs  Lab 06/04/18  0509   *   CALCIUM 8.7   ALBUMIN 2.2*   PROT 5.9*      K 4.4   CO2 19*      BUN 15   CREATININE 2.9*   ALKPHOS 119   ALT 11   AST 32   BILITOT 0.4            Assessment/Plan:     ESRD (end stage renal disease)    CACHORRO on superimposed on CKD stage 4 secondary to prograft toxcity/ischemic ATN (see last admission consult 3/11-5/18/18-discharge to rehab) now dialysis dependent via [facility] (since 3/14/2018) who is admitted from [facility] for fever 100-101.6 since Friday night. Last HD yesterday 5/28 3 L removed.    -L IJ permacath no overt signs of tunnel or exit site infection.       Plan/recommendations:  Will provide 4 hour HD treatment today for metabolic clearance/volume management.  -UF goal 2L as tolerated.  UF Profile 4 with low dialysate temp.    -Midodrine 10 mg PO prior to HD  -Continue DAQUAN Q MWF   -ok from renal standpoint for step-down          Juaquin Ibrahim NP  Nephrology  [facility]  Pager:  [phone]

## 2018-06-04 NOTE — PLAN OF CARE
Problem: Physical Therapy Goal  Goal: Physical Therapy Goal  Goals to be met by: 18     Patient will increase functional independence with mobility by performin. Supine to sit with Stand by Assistance. -not met  2. Sit to supine with Stand by Assistance. -not met  3. Rolling to Left and Right with [place]. -not met  4. Sit to stand transfer with Minimum Assistance. -not met  5. Gait  x 20 feet with Minimum Assistance. -not met  6. Ascend/descend 4 stairs with bilateral Handrails CGA. -not met  7. Lower extremity exercise program x20 reps per handout, with independence. -not met     Goals still appropriate at this time. Ada Turpin, CUONG 2018

## 2018-06-04 NOTE — ASSESSMENT & PLAN NOTE
- Documented on Day two of stay (tmax 102.4).  - Now afebrile.  - CT scan on admission + for congestion but equivocal for pneumonia. Present of moderate R- pleural effusion.  - Bronch cultures in progress. NGTD.   - Blood Cultures and resp panel continues to be  Negative including RSV.  - CMV weakly positive. Will repeat today per ID recs.   - Will stop cefepime and vanco per ID as he has completed 7 day course.  - Vanco trough check today.

## 2018-06-04 NOTE — PLAN OF CARE
Problem: Occupational Therapy Goal  Goal: Occupational Therapy Goal  Goals to be met by: 6/10/18     Patient will increase functional independence with ADLs by performing:    Feeding with Set-up Assistance.  UE Dressing with Minimal Assistance.  LE Dressing with Maximum Assistance.  Grooming while standing with Minimal Assistance.  Toileting from bedside commode with Moderate Assistance for hygiene and clothing management.   Toilet transfer to bedside commode with Minimal Assistance.  Upper extremity exercise program x10 reps per handout, with assistance as needed.     Outcome: Ongoing (interventions implemented as appropriate)  The above goals remain appropriate. AMADOU Murillo  6/4/2018

## 2018-06-04 NOTE — ASSESSMENT & PLAN NOTE
CACHORRO on superimposed on CKD stage 4 secondary to prograft toxcity/ischemic ATN (see last admission consult 3/11-5/18/18-discharge to rehab) now dialysis dependent via [facility] (since 3/14/2018) who is admitted from [facility] for fever 100-101.6 since Friday night. Last HD yesterday 5/28 3 L removed.   -L IJ permacath no overt signs of tunnel or exit site infection.       Plan/recommendations:  Will provide 4 hour HD treatment today for metabolic clearance/volume management.  -UF goal 2L as tolerated.  UF Profile 4 with low dialysate temp.    -Midodrine 10 mg PO prior to HD  -Continue DAQUAN Q MWF   -ok from renal standpoint for step-down

## 2018-06-04 NOTE — ASSESSMENT & PLAN NOTE
-S/p OHT 2014  -Continue prednisone, cellcept and tacrolimus  -continue tacro 5/5 BID. Goal is 5-10  -Plan to adjust today as needed

## 2018-06-04 NOTE — ASSESSMENT & PLAN NOTE
· Etiology remains elusive.  · Studies from bronchoscopy remain unrevealing up to this point.  · ID following.

## 2018-06-04 NOTE — ASSESSMENT & PLAN NOTE
-Persistent right sided pleural effusion  -Ct chest show moderate amount.   -S/P bronch and lavage (6/1).

## 2018-06-04 NOTE — CONSULTS
Inpatient consult to Physical Medicine Rehab  Consult performed by: SCAR CORCORAN  Consult ordered by: SHUKRI BLANCHARD  Reason for consult: assess rehab needs        Reviewed patient history and current admission.  Rehab team following.  Full consult to follow.    VAISHALI Augustine, FNP-C  Physical Medicine & Rehabilitation   06/04/2018  Spectralink: [id number]

## 2018-06-04 NOTE — PT/OT/SLP PROGRESS
Physical Therapy Treatment    Patient Name:  Lv Crocker   MRN:  [id number]    Recommendations:     Discharge Recommendations:  rehabilitation facility   Discharge Equipment Recommendations:  (TBD closer to D/c date)   Barriers to discharge: Decreased caregiver support    Assessment:     Lv Crocker is a 42 y.o. male admitted with a medical diagnosis of Fever.  He presents with the following impairments/functional limitations:  weakness, impaired endurance, impaired balance, gait instability, impaired functional mobilty, decreased upper extremity function, decreased lower extremity function, edema, decreased ROM. Pt nate treatment better with less assistance needed in BM and was able to ambulate 6 steps. Will cont with skilled PT services 5x/wk to increase strength, ambulation distance, and reach highest level of function. Recommending IP rehab facility once medically stable. OHT 2014.    Rehab Prognosis:  good; patient would benefit from acute skilled PT services to address these deficits and reach maximum level of function.      Recent Surgery: * No surgery found *      Plan:     During this hospitalization, patient to be seen 5 x/week to address the above listed problems via gait training, therapeutic activities, therapeutic exercises  · Plan of Care Expires:  06/30/18   Plan of Care Reviewed with: patient    Subjective     Communicated with nurse prior to session.  Patient found supine in bed upon PT entry to room, agreeable to treatment.      Chief Complaint: pain in both knees 2nd to history of knee injury  Patient comments/goals: to get better and go home.  Pain/Comfort:  · Pain Rating 1: 5/10  · Location 1:  B knees    Patients cultural, spiritual, Yarsani conflicts given the current situation: none    Objective:     Patient found with: oxygen, pulse ox (continuous), telemetry, dialysis catheter, blood pressure cuff, B TruVue boots    General Precautions: Standard, fall   Orthopedic  Precautions:    Braces:       Functional Mobility:  · Bed Mobility:     · Rolling Right: minimum assistance with verbal cues for hand placement and sequencing in functional mobility.   · Supine to Sit: minimum assistance  ·   · Transfers:     · Sit to Stand:  maximal assistance with B Darco shoes on  · Stand Pivot to chair: maximal assistance with B Darco shoes on  ·   · Gait: 6 steps with maximal assistance x2 with B Darco shoes on. Provided occasional verbal cues to stand tall and activate glutes. Pt was able to self correct.      AM-PAC 6 CLICK MOBILITY  Turning over in bed (including adjusting bedclothes, sheets and blankets)?: 3  Sitting down on and standing up from a chair with arms (e.g., wheelchair, bedside commode, etc.): 2  Moving from lying on back to sitting on the side of the bed?: 3  Moving to and from a bed to a chair (including a wheelchair)?: 2  Need to walk in hospital room?: 2  Climbing 3-5 steps with a railing?: 1  Total Score: 13       Therapeutic Activities and Exercises:   BLE AAROM exercises supine in bed x10: ankle pumps, heel slides, abduction/adduction, LAQs with pillow under knees.     Pt needed assistance in completing exercises and had  empty end feels in heel slides and LAQs due to B knee pain.     Patient left up in chair with all lines intact and call button in reach..    GOALS:    Physical Therapy Goals        Problem: Physical Therapy Goal    Goal Priority Disciplines Outcome Goal Variances Interventions   Physical Therapy Goal     PT/OT, PT      Description:  Goals to be met by: 18     Patient will increase functional independence with mobility by performin. Supine to sit with Stand by Assistance. -not met  2. Sit to supine with Stand by Assistance. -not met  3. Rolling to Left and Right with [place]. -not met  4. Sit to stand transfer with Minimum Assistance. -not met  5. Gait  x 20 feet with Minimum Assistance. -not met  6. Ascend/descend 4 stairs with  bilateral Handrails CGA. -not met  7. Lower extremity exercise program x20 reps per handout, with independence. -not met                       Time Tracking:     PT Received On: 06/04/18  PT Start Time: 0801     PT Stop Time: 0829  PT Total Time (min): 28 min     Billable Minutes: Gait Training 8 minutes and Therapeutic Exercise 20 minutes    Treatment Type: Treatment  PT/PTA: PT     PTA Visit Number: 0     CUONG Thomas  06/04/2018

## 2018-06-04 NOTE — SUBJECTIVE & OBJECTIVE
Interval History:   NAEON.  He reports resolution of diarrhea.  No complaints on exam, oxygenating well on RA.  Last hemodialysis was on Saturday and tolerated well.  Primary team planning on step-down today.    Review of patient's allergies indicates:   Allergen Reactions    No known drug allergies      Current Facility-Administered Medications   Medication Frequency    0.9%  NaCl infusion (for blood administration) Q24H PRN    0.9%  NaCl infusion PRN    0.9%  NaCl infusion Once    0.9%  NaCl infusion Once    acetaminophen tablet 650 mg Q6H PRN    ALPRAZolam tablet 0.5 mg Q6H PRN    aspirin EC tablet 81 mg Daily    benzonatate capsule 100 mg TID PRN    ceFEPIme injection 1 g Q12H    cetirizine tablet 5 mg Daily    dextrose 50% injection 12.5 g PRN    dextrose 50% injection 25 g PRN    enoxaparin injection 30 mg Daily    epoetin jose miguel injection 10,000 Units Every Mon, Wed, Fri    escitalopram oxalate tablet 20 mg Daily    fentaNYL injection 100 mcg PRN    gabapentin capsule 100 mg TID    glucagon (human recombinant) injection 1 mg PRN    glucose chewable tablet 16 g PRN    glucose chewable tablet 24 g PRN    insulin aspart U-100 pen 1-10 Units QID (AC + HS) PRN    insulin detemir U-100 pen 6 Units BID    levalbuterol nebulizer solution 0.63 mg Q4H PRN    levothyroxine tablet 137 mcg Before breakfast    lidocaine HCL 2% jelly PRN    midodrine tablet 10 mg TID WM    midodrine tablet 10 mg PRN    mycophenolate capsule 250 mg BID    ondansetron disintegrating tablet 8 mg Q8H PRN    oxyCODONE immediate release tablet 5 mg Q6H PRN    pantoprazole EC tablet 40 mg Daily    pravastatin tablet 40 mg Daily    predniSONE tablet 2.5 mg Daily    promethazine (PHENERGAN) 12.5 mg in dextrose 5 % 50 mL IVPB Q6H PRN    QUEtiapine tablet 50 mg QHS    tacrolimus capsule 5 mg BID       Objective:     Vital Signs (Most Recent):  Temp: 98.9 °F (37.2 °C) (06/04/18 1500)  Pulse: 107 (06/04/18 1500)  Resp:  (!) 33 (06/04/18 1500)  BP: 97/62 (06/04/18 1500)  SpO2: (!) 94 % (06/04/18 1516)  O2 Device (Oxygen Therapy): nasal cannula (06/04/18 1516) Vital Signs (24h Range):  Temp:  [98.1 °F (36.7 °C)-98.9 °F (37.2 °C)] 98.9 °F (37.2 °C)  Pulse:  [100-115] 107  Resp:  [10-33] 33  SpO2:  [86 %-100 %] 94 %  BP: ()/(52-88) 97/62     Weight: 73.3 kg (161 lb 9.6 oz) (06/04/18 0400)  Body mass index is 25.31 kg/m².  Body surface area is 1.86 meters squared.    I/O last 3 completed shifts:  In: 175 [I.V.:175]  Out: -     Physical Exam   Constitutional: He is oriented to person, place, and time. He appears well-developed. He appears ill. No distress. Nasal cannula in place.   HENT:   Head: Normocephalic and atraumatic.   Right Ear: External ear normal.   Left Ear: External ear normal.   Eyes: Conjunctivae and EOM are normal. Right eye exhibits no discharge. Left eye exhibits no discharge.   Neck: Normal range of motion. Neck supple.   Cardiovascular: Regular rhythm.  Tachycardia present.    Pulmonary/Chest: Effort normal. No respiratory distress. He has no wheezes. He has no rales.   Abdominal: Soft. Bowel sounds are normal. He exhibits no distension. There is no tenderness.   Musculoskeletal: He exhibits edema.   Neurological: He is alert and oriented to person, place, and time.   Skin: Skin is warm and dry. He is not diaphoretic.   -L IJ Permacath      Psychiatric: He has a normal mood and affect.       Significant Labs:  CBC:   Recent Labs  Lab 06/04/18  0509   WBC 5.28   RBC 2.79*   HGB 8.4*   HCT 28.1*      *   MCH 30.1   MCHC 29.9*     CMP:   Recent Labs  Lab 06/04/18  0509   *   CALCIUM 8.7   ALBUMIN 2.2*   PROT 5.9*      K 4.4   CO2 19*      BUN 15   CREATININE 2.9*   ALKPHOS 119   ALT 11   AST 32   BILITOT 0.4

## 2018-06-04 NOTE — PROGRESS NOTES
Patient has recurrent hypoglycemic episodes in previous days. Patient does not eat adequately and has repeating episodes:     6/3 BG 29 @ 1706  6/2 BG 60 @ 0758  6/1 BG 50 @ 1248    Notified Dr. Neal. Doctor modified Detemir 8 units to 6 units. Will continue to monitor.

## 2018-06-04 NOTE — SUBJECTIVE & OBJECTIVE
Interval History: No acute overnight event. Wants to get back to rehab.    Scheduled Meds:   sodium chloride 0.9%   Intravenous Once    aspirin  81 mg Oral Daily    ceFEPime (MAXIPIME) IVPB  1 g Intravenous Q12H    cetirizine  5 mg Oral Daily    enoxaparin  30 mg Subcutaneous Daily    epoetin jose miguel (PROCRIT) injection  10,000 Units Intravenous Every Mon, Wed, Fri    escitalopram oxalate  20 mg Oral Daily    gabapentin  100 mg Oral TID    insulin detemir U-100  6 Units Subcutaneous BID    levothyroxine  137 mcg Oral Before breakfast    midodrine  10 mg Oral TID WM    mycophenolate  250 mg Oral BID    pantoprazole  40 mg Oral Daily    pravastatin  40 mg Oral Daily    predniSONE  2.5 mg Oral Daily    QUEtiapine  50 mg Oral QHS    tacrolimus  5 mg Oral BID     PRN Meds:sodium chloride, sodium chloride 0.9%, acetaminophen, ALPRAZolam, benzonatate, dextrose 50%, dextrose 50%, fentaNYL, glucagon (human recombinant), glucose, glucose, insulin aspart U-100, levalbuterol, lidocaine HCL 2%, midazolam, midazolam, ondansetron, oxyCODONE, promethazine (PHENERGAN) IVPB    Review of patient's allergies indicates:   Allergen Reactions    No known drug allergies      Objective:     Vital Signs (Most Recent):  Temp: 98.1 °F (36.7 °C) (06/04/18 0500)  Pulse: 100 (06/04/18 0701)  Resp: 15 (06/04/18 0701)  BP: (!) 84/52 (06/04/18 0701)  SpO2: 100 % (06/04/18 0701) Vital Signs (24h Range):  Temp:  [97.7 °F (36.5 °C)-98.3 °F (36.8 °C)] 98.1 °F (36.7 °C)  Pulse:  [100-115] 100  Resp:  [10-30] 15  SpO2:  [90 %-100 %] 100 %  BP: ()/(50-88) 84/52     Patient Vitals for the past 72 hrs (Last 3 readings):   Weight   06/04/18 0400 73.3 kg (161 lb 9.6 oz)   06/03/18 0400 74 kg (163 lb 2.3 oz)   06/02/18 0400 73.4 kg (161 lb 13.1 oz)     Body mass index is 25.31 kg/m².      Intake/Output Summary (Last 24 hours) at 06/04/18 0747  Last data filed at 06/04/18 0701   Gross per 24 hour   Intake              120 ml   Output                 0 ml   Net              120 ml          Telemetry: no event noted    Physical Exam   Constitutional: He is oriented to person, place, and time. He appears well-developed and well-nourished.   HENT:   Head: Normocephalic.   Eyes: Pupils are equal, round, and reactive to light.   Neck: Normal range of motion. Neck supple.   Cardiovascular: Normal rate and regular rhythm.    Pulmonary/Chest: Effort normal.   Decreased BLL   Abdominal: Soft. Bowel sounds are normal.   Musculoskeletal: Normal range of motion.   Neurological: He is alert and oriented to person, place, and time.   Skin: Skin is warm and dry.   Psychiatric: He has a normal mood and affect. His behavior is normal.   Nursing note and vitals reviewed.     Significant Labs:  CBC:    Recent Labs  Lab 06/02/18  0441 06/03/18  0455 06/04/18  0509   WBC 4.22 4.39 5.28   RBC 2.73* 2.73* 2.79*   HGB 8.2* 8.3* 8.4*   HCT 26.7* 27.4* 28.1*    178 230   MCV 98 100* 101*   MCH 30.0 30.4 30.1   MCHC 30.7* 30.3* 29.9*     BNP:    Recent Labs  Lab 05/29/18  0303   BNP 1,228*     CMP:    Recent Labs  Lab 06/02/18  0441 06/03/18  0455 06/04/18  0509   GLU 75 145* 138*   CALCIUM 8.6* 8.4* 8.7   ALBUMIN 2.0* 2.1* 2.2*   PROT 5.5* 5.4* 5.9*    139 138   K 4.5 3.8 4.4   CO2 24 25 19*    106 105   BUN 10 9 15   CREATININE 1.6* 1.5* 2.9*   ALKPHOS 137* 125 119   ALT 12 10 11   AST 16 15 32   BILITOT 0.6 0.5 0.4      Coagulation:   No results for input(s): PT, INR, APTT in the last 168 hours.  LDH:  No results for input(s): LDH in the last 72 hours.  Microbiology:  Microbiology Results (last 7 days)     Procedure Component Value Units Date/Time    Blood culture #2 **CANNOT BE ORDERED STAT** [[id number]] Collected:  05/29/18 0319    Order Status:  Completed Specimen:  Blood from Peripheral, Hand, Left Updated:  06/03/18 0612     Blood Culture, Routine No growth after 5 days.    Blood culture #1 **CANNOT BE ORDERED STAT** [[id number]] Collected:  05/29/18 0309     Order Status:  Completed Specimen:  Blood from Peripheral, Wrist, Right Updated:  06/03/18 0612     Blood Culture, Routine No growth after 5 days.    AFB Culture & Smear [[id number]] Collected:  06/01/18 1157    Order Status:  Completed Specimen:  Body Fluid from Lung, Lingula Updated:  06/02/18 2127     AFB Culture & Smear Culture in progress    Culture, Respiratory [[id number]] Collected:  06/01/18 1156    Order Status:  Completed Specimen:  Respiratory from BAL, LLL Updated:  06/02/18 1136     Respiratory Culture No Growth     Gram Stain (Respiratory) <10 epithelial cells per low power field.     Gram Stain (Respiratory) Rare WBC's     Gram Stain (Respiratory) No organisms seen    C Diff Toxin by PCR [[id number]] Collected:  06/01/18 1514    Order Status:  Completed Updated:  06/02/18 0404     C. diff PCR Negative    Clostridium difficile EIA [[id number]]  (Abnormal) Collected:  06/01/18 1514    Order Status:  Completed Specimen:  Stool from Stool Updated:  06/02/18 0319     C. diff Antigen Positive (A)     C difficile Toxins A+B, EIA Negative     Comment: Testing not recommended for children <24 months old.       KOH prep [[id number]] Collected:  06/01/18 1157    Order Status:  Completed Specimen:  Body Fluid from Lung, Lingula Updated:  06/01/18 1731     KOH Prep No yeast or fungal elements seen    Narrative:       Bronchial Wash    Respiratory Viral Panel by PCR [facility]; Sputum (BAL) [[id number]] Collected:  06/01/18 1157    Order Status:  Sent Specimen:  Nasopharyngeal Updated:  06/01/18 1458    Fungus culture [[id number]] Collected:  06/01/18 1157    Order Status:  Sent Specimen:  Body Fluid from Lung, Lingula Updated:  06/01/18 1458    Culture, Respiratory with Gram Stain [[id number]] Collected:  06/01/18 1156    Order Status:  Canceled Specimen:  Respiratory from BAL, LLL Updated:  06/01/18 1457    Gram stain [[id number]] Collected:  06/01/18 1157    Order Status:  Canceled Specimen:  Body Fluid from Lung,  Lingula Updated:  06/01/18 1456    Cryptococcal antigen [[id number]] Collected:  05/30/18 1222    Order Status:  Completed Specimen:  Blood from Blood Updated:  05/31/18 1228     Cryptococcal Ag, Blood Negative    Respiratory Viral Panel by PCR [facility]; Nasal Swab [[id number]] Collected:  05/29/18 2333    Order Status:  Completed Specimen:  Respiratory Updated:  05/31/18 1042     Respiratory Virus Panel, source Nasal Swab     RVP - Adenovirus Not Detected     Comment: Detects Serotypes B and E. Detection of Serotype C may   be limited. If Adenovirus infection is suspected and a   Not Detected result is returned the sample should be   re-tested for Adenovirus using an independent method  (e.g. [employer] Adenovirus Quantitative Real-Time  PCR test.          Enterovirus Not Detected     Comment: Cross-reactivity has been observed between certain Rhinovirus  strains and the Enterovirus assay.          Human Bocavirus Not Detected     Human Coronavirus Not Detected     Comment: The Human Coronavirus assay detects Human coronavirus types  229E, OC43,NL63 and HKU1.          RVP - Human Metapneumovirus (hMPV) Not Detected     RVP - Influenza A Not Detected     Influenza A - [id number] Not Detected     RVP - Influenza B Not Detected     Parainfluenza Not Detected     Respiratory Syncytial VirusVirus (RSV) A Not Detected     Comment: The Respiratory Syncytial Viral assay detects types A and B,  however it does not distinguish between the two.          RVP - Rhinovirus Not Detected     Comment: Cross-Reactivity has been observed between certain   Rhinovirus strains and the Enterovirus assay.  Target Enriched Mulitplex Polymerase Chain Reaction (TEM-PCR)  allows for the detection of multiple pathogens out of a single  reaction.  This test was developed and its performance   characteristics determined by [employer].  It has not   been cleared or approved by the U.S.Food and Drug Administration.  Results should be used in  conjunction with clinical findings,   and should not form the sole basis for a diagnosis or treatment  decision.  TEM-PCR is a licensed technology of [employer].         Narrative:       Receiving Lab:->[facility]    Respiratory Viral Panel by PCR [facility]; Sputum [[id number]]     Order Status:  Canceled Specimen:  Respiratory         I have reviewed all pertinent labs within the past 24 hours.    Estimated Creatinine Clearance: 30.4 mL/min (A) (based on SCr of 2.9 mg/dL (H)).

## 2018-06-04 NOTE — ASSESSMENT & PLAN NOTE
- improving. Possibly iatrogenic  - could be from current abx  - C- diff negative. Repeating CMV today.   - Discussed with ID (Dr. Eric)   unknown

## 2018-06-04 NOTE — SUBJECTIVE & OBJECTIVE
No major issues overnight.  No respiratory complaints this morning.    Objective:     Vital Signs:  Temp:  [98.1 °F (36.7 °C)-98.9 °F (37.2 °C)] 98.9 °F (37.2 °C)  Pulse:  [] 102  Resp:  [10-33] 19  SpO2:  [86 %-100 %] 99 %  BP: ()/(52-88) 118/72     Weight: 73.3 kg (161 lb 9.6 oz)  Body mass index is 25.31 kg/m².     Gross per 24 hour   Intake              335 ml   Output                0 ml   Net              335 ml     Physical Exam  Gen: no distress, resting comfortably on room air   HEENT: lips, mucosa, and tongue normal; teeth and gums normal and no throat erythema  conjunctivae/corneas clear. PERRL.   CVS: regular rate and rhythm, S1, S2 normal, no murmur   Chest: clear to auscultation bilaterally, normal respiratory effort and normal percussion bilaterally   Abdomen: soft, non-tender non-distended; bowel sounds normal   Ext: warm, well perfused and no cyanosis or edema, or clubbing   Skin: no rashes, no ecchymoses, no petechiae   Neuro: oriented, normal mood, grossly non-focal       Vents:  Oxygen Concentration (%): 30 (06/01/18 0609)    Significant Labs:  WBC 4.39 5.28   HGB 8.3* 8.4*   HCT 27.4* 28.1*    230   * 101*   RDW 20.4* 19.7*      138   K 3.8 4.4    105   CO2 25 19*   BUN 9 15   CREATININE 1.5* 2.9*   CALCIUM 8.4* 8.7   ALBUMIN 2.1* 2.2*   PROT 5.4* 5.9*   BILITOT 0.5 0.4   ALKPHOS 125 119   ALT 10 11   AST 15 32   MG 1.9 2.2   All pertinent labs within the past 24 hours have been reviewed.    Significant Imaging:  No new images this morning

## 2018-06-04 NOTE — PT/OT/SLP PROGRESS
Occupational Therapy   Treatment    Name: Lv Crocker  MRN: [id number]  Admitting Diagnosis:  Fever       Recommendations:     Discharge Recommendations: rehabilitation facility  Discharge Equipment Recommendations:   (TBD closer to d/c)  Barriers to discharge:  Inaccessible home environment, Decreased caregiver support (at current functional level)    Subjective     Communicated with: RN prior to session.  Pain/Comfort:  · Pain Rating 1: 5/10  · Location - Side 1: Bilateral  · Location - Orientation 1: generalized  · Location 1: knee  · Pain Addressed 1: Reposition, Distraction, Cessation of Activity  · Pain Rating Post-Intervention 1: 5/10    Patients cultural, spiritual, Confucianism conflicts given the current situation: none reported    Objective:     Patient found with: blood pressure cuff, pulse ox (continuous), telemetry, oxygen, peripheral IV    General Precautions: Standard, fall   Orthopedic Precautions:    Braces:       Occupational Performance:    Bed Mobility:    · Patient completed Rolling/Turning to Right with minimum assistance  · Patient completed Scooting/Bridging with minimum assistance  · Patient completed Supine to Sit with minimum assistance     Functional Mobility/Transfers:  · Patient completed Sit <> Stand Transfer with maximal assistance  with  no assistive device   · Patient completed Bed <> Chair Transfer using Stand Pivot technique with maximal assistance with no assistive device  · Functional Mobility: 6 steps with B HHA    Activities of Daily Living:  · Feeding:  stand by assistance    · Grooming: minimum assistance    · UB Dressing: moderate assistance    · LB Dressing: maximal assistance      Patient left up in chair with all lines intact, call button in reach and RN notified    AMPA 6 Click:  [facility] Total Score: 14    Treatment & Education:  Pt ed on OT POC  Pt ed on A/AAROM ex's for proximal UE ROM  Pt provided with Theraband and HEP for ER, elbow flexion and elbow  extension  Education:    Assessment:     Lv Crocker is a 42 y.o. male with a medical diagnosis of Fever.   Performance deficits affecting function are impaired balance, impaired self care skills, weakness, impaired endurance, impaired functional mobilty, gait instability, decreased upper extremity function, decreased lower extremity function, pain, impaired skin, impaired cardiopulmonary response to activity.      Rehab Prognosis:  Good; patient would benefit from acute skilled OT services to address these deficits and reach maximum level of function.       Plan:     Patient to be seen 4 x/week to address the above listed problems via self-care/home management, therapeutic activities, therapeutic exercises  · Plan of Care Expires: 06/30/18  · Plan of Care Reviewed with: patient    This Plan of care has been discussed with the patient who was involved in its development and understands and is in agreement with the identified goals and treatment plan    GOALS:    Occupational Therapy Goals        Problem: Occupational Therapy Goal    Goal Priority Disciplines Outcome Interventions   Occupational Therapy Goal     OT, PT/OT Ongoing (interventions implemented as appropriate)    Description:  Goals to be met by: 6/10/18     Patient will increase functional independence with ADLs by performing:    Feeding with Set-up Assistance.  UE Dressing with Minimal Assistance.  LE Dressing with Maximum Assistance.  Grooming while standing with Minimal Assistance.  Toileting from bedside commode with Moderate Assistance for hygiene and clothing management.   Toilet transfer to bedside commode with Minimal Assistance.  Upper extremity exercise program x10 reps per handout, with assistance as needed.                      Time Tracking:     OT Date of Treatment: 06/04/18  OT Start Time: 0815 (second visit 1426)  OT Stop Time: 0829 (second visit 1437)  OT Total Time (min): 14 min    Billable Minutes:Therapeutic Activity  10  Therapeutic Exercise 14    AMADOU Murillo  6/4/2018

## 2018-06-04 NOTE — PHYSICIAN QUERY
PT Name: Lv Crocker  MR #: [id number]     Physician Query Form - Diagnosis Clarification      CDS/: Dhara Oviedo               Contact information: [email or web address]     This form is a permanent document in the medical record.     Query Date: June 4, 2018    By submitting this query, we are merely seeking further clarification of documentation.  Please utilize your independent clinical judgment when addressing the question(s) below.     The medical record contains the following:      Findings Supporting Clinical Information Location in Medical Record   Differential ddx includes pulmonary edema, sepsis, pneumonia       Sepsis - multiple potential sources in setting of immunosuppression: lung consolidation, pleural effusion, retained urine, gangrenous toes, ascites, other- on vanc and cefepime    WBC 7.52-3.82-5.28    Lactate,Riley  0.9    admitted on 5/29/2018 with acute onset fevers, hypoxia, and multifocal patchy pulmonary opacities due to presumptive bacterial pneumonia (CAP vs HCAP). He is improved on empiric vanc/cefepime.   -would stop vanc and cefepime and complete 7 day course of antibiotics for HCAP with moxifloxacin  - BAL cultures and fungal markers negative to date    ED Provider Note 5/29      Pulmonology Consult 5/30        Labs 5/29-6/4    Lab 5/29      ID PN 6/3     Please clarify if the __Sepsis___ diagnosis has been:    [  ] Ruled In  [  ] Ruled In, Now Resolved  [  ] Ruled Out  [  ] Clinically insignificant  [ x ] Clinically undetermined  [  ] Other/Clarification of findings (please specify)_______________________________    Please document in your progress notes daily for the duration of treatment, until resolved, and include in your discharge summary.

## 2018-06-04 NOTE — PLAN OF CARE
Problem: Patient Care Overview  Goal: Plan of Care Review  Outcome: Ongoing (interventions implemented as appropriate)    Neuro: AAOx4. Flat affect. Able to follow all commands. Pupils are equal and reactive. Denies any numbness or tingling in all 4 extremities.       Pulmonary: Breath sounds are clear with diminished bases but no signs of distress. RA sats 100%     Cardiovascular: Sinus tach. Keep MAP >55.       Gastrointestinal: Cardiac diet. All 4 bowel sounds are active. Denies any abdominal pain or tenderness. 3 BMs overnight.      Genitourinary: Anuric. Reliant on Dialysis.      Endocrine: CBG = 135 spot check in AM. No SSI given.     Integumentary/Other: Left and right big toe, right 2nd and third toe are black. Patient denies any numbness or tingling. Patient has 2 stage 2 pressure ulcers on coccyx. Applied triad cream and new dressing to coccyx.     Bed in low, locked position, call light within reach, side rails up x3. Gave complete chlorhexidine bath and changed linens. Will continue to monitor.

## 2018-06-05 NOTE — HPI
Lv Crocker is a 42-year-old male with PMHx of DMI, HLD, GERD, depression, arthritis, hashimoto's thyroiditis, CAD, CHF, and cardiomyopathy s/p OHTX 11/18/2014 with early post-op course complicated by hemorrhagic tamponade s/p  washout, delayed closure, pericardial window with subsequent a-fib with RVR and CACHORRO and late course complicated by ABMR (in 4/2015 s/p rituxan, PLEX, and IVIG), C.diff/CMV reactivation, CKD IV, and restrictive cardiomyopathy with subsequent chronic/recurrent pleural effusions and ascites previously requiring monthly paracentesis and thoracentesis until he underwent VATS with pleurX catheter placement on 1/11/2018 with removal on 2/7/2018.  He recently has an admission from 03/208-05/2018 with a very complicated hospital course and was discharged to [facility]. Transferred from [facility] to [facility] ED on 5/29 with fever and was started on broad spectrum abx. BCX and resp cx NGTD; however, fungal culture is pending. Hospital course complicated by diarrhea (C.diff negative. Repeat CMV weakly positive), dry gangrene of foot, pleura effusion (s/p bronch and lavage on 6/1), pulmonary infiltrates (s/p bronch with cx in progress, cefepime and Vanc 7 day course completed), & depression.      Functional History:  Per chart review, patient lives in [place], [place], with his wife and 15-year-old son in a single story home with 7 steps to enter.  Prior to admission, he was (I) with ADLs and mobility; however, mobility limited by fatigue/endurance.  Reported over last ~6 months with limited activity.  Most days spent indoors, in which he ambulated to and from bathroom.  Completed inpatient rehab program after transplant in 2014 with subsequent home health therapy.  Reported no further therapy since that time.  DME: rollator.

## 2018-06-05 NOTE — ASSESSMENT & PLAN NOTE
- Resolved.  - CT scan on admission + for congestion but equivocal for pneumonia. Present of moderate R- pleural effusion.  - Bronch cultures in progress. NGTD.   - Blood Cultures and resp panel continues to be  Negative including RSV.  - CMV weakly positive. Will repeat today per ID recs.   - Stopped cefepime and vanco per ID as he has completed 7 day course.

## 2018-06-05 NOTE — ASSESSMENT & PLAN NOTE
-S/p OHTX 11/18/2014 with early post-op course complicated by hemorrhagic tamponade s/p  washout, delayed closure, pericardial window with subsequent a-fib with RVR and CACHORRO and late course complicated by ABMR (in 4/2015 s/p rituxan, PLEX, and IVIG), C.diff/CMV reactivation, CKD, and restrictive cardiomyopathy with subsequent chronic/recurrent pleural effusions and ascites previously requiring monthly paracentesis and thoracentesis until he underwent VATS with pleurX catheter placement on 1/11/2018 with removal on 2/7/2018

## 2018-06-05 NOTE — HOSPITAL COURSE
5/31/18: Evaluated by OT.  Bed mobility ModA .  Sit to stand and transfers MaxA. No gait.  UBD/grooming ModA and LBD MaxA. toileting totalA.  6/4/18: Participating with therapy. Bed mobility Sonia.  Sit to stand and transfers MaxA.  Ambulated 6 steps MaxA x 2 ppl with Darco shoe.  UBD ModA. LBD MaxA. Grooming Sonia. Feeding SBA.   6/5/18: Participated with therapy. Bed mobility SBA-MaxA.  Sit to stand ModA x 2 ppl and transfers MaxA x 2ppl.  Ambulated small steps MaxA x 2 ppl with Darco shoe.  UBD ModA. LBD MaxA.

## 2018-06-05 NOTE — PT/OT/SLP PROGRESS
Physical Therapy Treatment    Patient Name:  Lv Crocker   MRN:  [id number]    Recommendations:     Discharge Recommendations:  rehabilitation facility   Discharge Equipment Recommendations:  (TBD at next level of care)   Barriers to discharge: Decreased caregiver support    Assessment:     Lv Crocker is a 42 y.o. male admitted with a medical diagnosis of Fever.  He presents with the following impairments/functional limitations:  weakness, impaired functional mobilty, impaired endurance, impaired self care skills, gait instability, decreased lower extremity function, decreased upper extremity function, decreased ROM, impaired muscle length, impaired balance, impaired joint extensibility, impaired skin. Pt able to perform stand pivot transfer to/from bedside chair this date with pt taking small steps during pivot transfer. However, pt requiring assist of 2 to safely perform standing tasks. Noted improvement in mobility compared to previous admission, but pt continues to demo BUE and BLE weakness limiting (I) with mobility. Pt would continue to benefit from skilled acute PT in order to address current deficits and progress functional mobility. Pt is a good candidate for IP rehab in order maximize rehab potential and progress functional mobility prior to return home.     Rehab Prognosis:  good; patient would benefit from acute skilled PT services to address these deficits and reach maximum level of function.      Recent Surgery: * No surgery found *      Plan:     During this hospitalization, patient to be seen 5 x/week to address the above listed problems via gait training, therapeutic activities, therapeutic exercises, neuromuscular re-education  · Plan of Care Expires:  06/30/18   Plan of Care Reviewed with: patient    Subjective     Communicated with RN prior to session.  Patient found supine upon PT entry to room, agreeable to treatment.      Chief Complaint: none noted   Patient  comments/goals: return to rehab   Pain/Comfort:  · Pain Rating 1: 0/10    Patients cultural, spiritual, Bahai conflicts given the current situation: none noted     Objective:     Patient found with: telemetry, pressure relief boots     General Precautions: Standard, fall   Orthopedic Precautions:RLE weight bearing as tolerated, LLE weight bearing as tolerated   Braces:  (B Darco shoes)     Functional Mobility:  · Bed Mobility:     · Rolling Right: minimum assistance with side rail  · Seated scooting along EOB: minAx2 x2 reps   · Supine scooting to HOB: totalAx2 with drawsheet  · Supine to Sit: maximal assistance  · Sit to Supine: maximal assistance  · Transfers:     · Sit to Stand:  modAx2 with no AD from EOB; maxAx2 with no AD from bedside chair  · Bed<>Chair: stand pivot with maxAx2 (x2 reps)  · Gait: Pt able to take small steps during stand pivot bed<>chair with maxAx2. B knees blocked throughout with L knee buckling noted during return to bed. Decreased toe-floor clearance, decreased hip ext., impaired weight-shifting ability throughout. Increased difficulty advancing RLE during return to bed.       AM-PAC 6 CLICK MOBILITY  Turning over in bed (including adjusting bedclothes, sheets and blankets)?: 2  Sitting down on and standing up from a chair with arms (e.g., wheelchair, bedside commode, etc.): 2  Moving from lying on back to sitting on the side of the bed?: 2  Moving to and from a bed to a chair (including a wheelchair)?: 2  Need to walk in hospital room?: 2  Climbing 3-5 steps with a railing?: 1  Total Score: 11       Therapeutic Activities and Exercises:  B Darco shoes donned. 2nd gown donned as robe. Performed stand pivot to chair with maxAx2. PT gave pager number to RN to assist pt back to bed when needed.    Pt left [facility] for ~2 hours. PT/OT returned to assist pt back to bed. Performed stand pivot with maxAx2.     Patient left supine with all lines intact, call button in reach and RN  notified..    GOALS:    Physical Therapy Goals        Problem: Physical Therapy Goal    Goal Priority Disciplines Outcome Goal Variances Interventions   Physical Therapy Goal     PT/OT, PT Ongoing (interventions implemented as appropriate)     Description:  Goals to be met by: 18     Patient will increase functional independence with mobility by performin. Supine to sit with Stand by Assistance. -not met  2. Sit to supine with Stand by Assistance. -not met  3. Rolling to Left and Right with [place]. -not met  4. Sit to stand transfer with Minimum Assistance. -not met  5. Gait  x 20 feet with Minimum Assistance. -not met  6. Ascend/descend 4 stairs with bilateral Handrails CGA. -not met  7. Lower extremity exercise program x20 reps per handout, with independence. -not met                       Time Tracking:     PT Received On: 18  PT Start Time: 1034     PT Stop Time: 1052  PT Total Time (min): 18 min   + PT returned from 13:00 - 13:12 (12 minutes)  Total Treatment Time: 30 minutes    Billable Minutes: Therapeutic Activity 30   (co-tx with OT)    Treatment Type: Treatment  PT/PTA: PT     PTA Visit Number: 0     Petra Lake PT, DPT   2018  [phone]

## 2018-06-05 NOTE — PROGRESS NOTES
[facility]  Heart Transplant  Progress Note    Patient Name: Lv Crocker  MRN: [id number]  Admission Date: 5/29/2018  Hospital Length of Stay: 7 days  Attending Physician: Jose A Lloyd MD  Primary Care Provider: Philippe Mohr MD  Principal Problem:Fever    Subjective:     Interval History: No acute overnight event. Wants to get back to rehab.    Scheduled Meds:   sodium chloride 0.9%   Intravenous Once    aspirin  81 mg Oral Daily    cetirizine  5 mg Oral Daily    enoxaparin  30 mg Subcutaneous Daily    epoetin jose miguel (PROCRIT) injection  10,000 Units Intravenous Every Mon, Wed, Fri    escitalopram oxalate  20 mg Oral Daily    gabapentin  100 mg Oral TID    insulin detemir U-100  6 Units Subcutaneous BID    levothyroxine  137 mcg Oral Before breakfast    midodrine  10 mg Oral TID WM    mycophenolate  250 mg Oral BID    pantoprazole  40 mg Oral Daily    pravastatin  40 mg Oral Daily    predniSONE  2.5 mg Oral Daily    QUEtiapine  50 mg Oral QHS    tacrolimus  5 mg Oral BID     PRN Meds:sodium chloride, sodium chloride 0.9%, acetaminophen, ALPRAZolam, benzonatate, dextrose 50%, dextrose 50%, fentaNYL, glucagon (human recombinant), glucose, glucose, insulin aspart U-100, levalbuterol, lidocaine HCL 2%, midodrine, ondansetron, oxyCODONE, promethazine (PHENERGAN) IVPB    Review of patient's allergies indicates:   Allergen Reactions    No known drug allergies      Objective:     Vital Signs (Most Recent):  Temp: 97.5 °F (36.4 °C) (06/05/18 0800)  Pulse: 103 (06/05/18 1114)  Resp: 18 (06/05/18 0800)  BP: (!) 98/52 (06/05/18 0800)  SpO2: 99 % (06/05/18 0800) Vital Signs (24h Range):  Temp:  [97.5 °F (36.4 °C)-98.9 °F (37.2 °C)] 97.5 °F (36.4 °C)  Pulse:  [] 103  Resp:  [14-33] 18  SpO2:  [86 %-100 %] 99 %  BP: ()/(48-72) 98/52     Patient Vitals for the past 72 hrs (Last 3 readings):   Weight   06/05/18 0525 77 kg (169 lb 12.1 oz)   06/04/18 0400 73.3 kg (161 lb  9.6 oz)   06/03/18 0400 74 kg (163 lb 2.3 oz)     Body mass index is 26.59 kg/m².      Intake/Output Summary (Last 24 hours) at 06/05/18 1128  Last data filed at 06/05/18 0800   Gross per 24 hour   Intake              650 ml   Output                1 ml   Net              649 ml          Telemetry: no event noted    Physical Exam   Constitutional: He is oriented to person, place, and time. He appears well-developed and well-nourished.   HENT:   Head: Normocephalic.   Eyes: Pupils are equal, round, and reactive to light.   Neck: Normal range of motion. Neck supple.   Cardiovascular: Normal rate and regular rhythm.    Pulmonary/Chest: Effort normal.   Decreased BLL   Abdominal: Soft. Bowel sounds are normal.   Musculoskeletal: Normal range of motion.   Neurological: He is alert and oriented to person, place, and time.   Skin: Skin is warm and dry.   Psychiatric: He has a normal mood and affect. His behavior is normal.   Nursing note and vitals reviewed.     Significant Labs:  CBC:    Recent Labs  Lab 06/03/18 0455 06/04/18  0509 06/05/18  0500   WBC 4.39 5.28 3.66*   RBC 2.73* 2.79* 2.74*   HGB 8.3* 8.4* 8.2*   HCT 27.4* 28.1* 27.0*    230 196   * 101* 99*   MCH 30.4 30.1 29.9   MCHC 30.3* 29.9* 30.4*     BNP:  No results for input(s): BNP in the last 168 hours.    Invalid input(s): BNPTRIAGELBLO  CMP:    Recent Labs  Lab 06/03/18 0455 06/04/18  0509 06/05/18  0500   * 138* 149*   CALCIUM 8.4* 8.7 8.3*   ALBUMIN 2.1* 2.2* 2.0*   PROT 5.4* 5.9* 5.3*    138 136   K 3.8 4.4 3.4*   CO2 25 19* 25    105 100   BUN 9 15 10   CREATININE 1.5* 2.9* 1.9*   ALKPHOS 125 119 117   ALT 10 11 7*   AST 15 32 15   BILITOT 0.5 0.4 0.4      Coagulation:   No results for input(s): PT, INR, APTT in the last 168 hours.  LDH:  No results for input(s): LDH in the last 72 hours.  Microbiology:  Microbiology Results (last 7 days)     Procedure Component Value Units Date/Time    AFB Culture & Smear [[id number]]  Collected:  06/01/18 1157    Order Status:  Completed Specimen:  Body Fluid from Lung, [place] Updated:  06/04/18 1455     AFB Culture & Smear Culture in progress     AFB CULTURE STAIN No acid fast bacilli seen.    Culture, Respiratory [[id number]] Collected:  06/01/18 1156    Order Status:  Completed Specimen:  Respiratory from BAL, LLL Updated:  06/04/18 0917     Respiratory Culture Normal respiratory hnak     Gram Stain (Respiratory) <10 epithelial cells per low power field.     Gram Stain (Respiratory) Rare WBC's     Gram Stain (Respiratory) No organisms seen    Blood culture #2 **CANNOT BE ORDERED STAT** [[id number]] Collected:  05/29/18 0319    Order Status:  Completed Specimen:  Blood from Peripheral, Hand, Left Updated:  06/03/18 0612     Blood Culture, Routine No growth after 5 days.    Blood culture #1 **CANNOT BE ORDERED STAT** [[id number]] Collected:  05/29/18 0303    Order Status:  Completed Specimen:  Blood from Peripheral, Wrist, Right Updated:  06/03/18 0612     Blood Culture, Routine No growth after 5 days.    C Diff Toxin by PCR [[id number]] Collected:  06/01/18 1514    Order Status:  Completed Updated:  06/02/18 0404     C. diff PCR Negative    Clostridium difficile EIA [[id number]]  (Abnormal) Collected:  06/01/18 1514    Order Status:  Completed Specimen:  Stool from Stool Updated:  06/02/18 0319     C. diff Antigen Positive (A)     C difficile Toxins A+B, EIA Negative     Comment: Testing not recommended for children <24 months old.       KOH prep [[id number]] Collected:  06/01/18 1157    Order Status:  Completed Specimen:  Body Fluid from Lung, [place] Updated:  06/01/18 1731     KOH Prep No yeast or fungal elements seen    Narrative:       Bronchial Wash    Respiratory Viral Panel by PCR [facility]; Sputum (BAL) [[id number]] Collected:  06/01/18 1157    Order Status:  Sent Specimen:  Nasopharyngeal Updated:  06/01/18 1458    Fungus culture [[id number]] Collected:  06/01/18 1157    Order Status:  Sent  Specimen:  Body Fluid from Lung, [place] Updated:  06/01/18 1458    Culture, Respiratory with Gram Stain [[id number]] Collected:  06/01/18 1156    Order Status:  Canceled Specimen:  Respiratory from BAL, LLL Updated:  06/01/18 1457    Gram stain [[id number]] Collected:  06/01/18 1157    Order Status:  Canceled Specimen:  Body Fluid from Lung, [place] Updated:  06/01/18 1456    Cryptococcal antigen [[id number]] Collected:  05/30/18 1222    Order Status:  Completed Specimen:  Blood from Blood Updated:  05/31/18 1228     Cryptococcal Ag, Blood Negative    Respiratory Viral Panel by PCR [facility]; Nasal Swab [[id number]] Collected:  05/29/18 2333    Order Status:  Completed Specimen:  Respiratory Updated:  05/31/18 1042     Respiratory Virus Panel, source Nasal Swab     RVP - Adenovirus Not Detected     Comment: Detects Serotypes B and E. Detection of Serotype C may   be limited. If Adenovirus infection is suspected and a   Not Detected result is returned the sample should be   re-tested for Adenovirus using an independent method  (e.g. [employer] Adenovirus Quantitative Real-Time  PCR test.          Enterovirus Not Detected     Comment: Cross-reactivity has been observed between certain Rhinovirus  strains and the Enterovirus assay.          Human Bocavirus Not Detected     Human Coronavirus Not Detected     Comment: The Human Coronavirus assay detects Human coronavirus types  229E, OC43,NL63 and HKU1.          RVP - Human Metapneumovirus (hMPV) Not Detected     RVP - Influenza A Not Detected     Influenza A - A5K3-69 Not Detected     RVP - Influenza B Not Detected     Parainfluenza Not Detected     Respiratory Syncytial VirusVirus (RSV) A Not Detected     Comment: The Respiratory Syncytial Viral assay detects types A and B,  however it does not distinguish between the two.          RVP - Rhinovirus Not Detected     Comment: Cross-Reactivity has been observed between certain   Rhinovirus strains and the Enterovirus  assay.  Target Enriched Mulitplex Polymerase Chain Reaction (TEM-PCR)  allows for the detection of multiple pathogens out of a single  reaction.  This test was developed and its performance   characteristics determined by [employer].  It has not   been cleared or approved by the U.S.Food and Drug Administration.  Results should be used in conjunction with clinical findings,   and should not form the sole basis for a diagnosis or treatment  decision.  TEM-PCR is a licensed technology of [employer].         Narrative:       Receiving Lab:->[facility]        I have reviewed all pertinent labs within the past 24 hours.    Estimated Creatinine Clearance: 46.4 mL/min (A) (based on SCr of 1.9 mg/dL (H)).      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 She denies him having any SOB, chest pain, diarrhea, nausea, vomiting. He doesnot make any urine since he has been non HD. Fever was noted to be as high as 101.6.She also notes that he has been coughing since being in ER but not before that.No SOB with PT at rehab.No dizziness or syncope. Wife reports he got an extra HD on Thursday because nephrology wanted to remove extra fluid.He has had a good appetite and tolerating PO and per wife has been working with PT and getting stronger. He has finished his abx course from last visit already.    * Fever    - Resolved.  - CT scan on admission + for congestion but equivocal for pneumonia. Present of moderate R- pleural effusion.  - Bronch cultures in progress. NGTD.    - Blood Cultures and resp panel continues to be  Negative including RSV.  - CMV weakly positive. Will repeat today per ID recs.   - Stopped cefepime and vanco per ID as he has completed 7 day course.        Heart transplanted    -S/p OHT 2014  -Continue prednisone, cellcept and tacrolimus  -continue tacro 5/5 BID. Goal is 5-10  -Plan to adjust today as needed        Physical debility    - secondary to prolonged illness  - PT/OT in patient and d/c back to rehab once cleared          Diarrhea of presumed infectious origin    - improving. Possibly iatrogenic  - could be from current abx  - C- diff negative. Repeating CMV today.   - Discussed with ID (Dr. Eric)        ESRD (end stage renal disease)    - on HD M, W, F.  - continue HD as scheduled. Appreciate nephrology assistance        Gangrene    - Dry gangrene developed previous admission with pressor use  - Will continue with betadine rx as current.   - Podiatry on board  - Foot XR and dopplers shows no changes concerning for infection          Pleural effusion    -Persistent right sided pleural effusion  -Ct chest show moderate amount.   -S/P bronch and lavage (6/1).          Type 1 diabetes mellitus with stage 3 chronic kidney disease    Insulin sliding scale  -continue detemir  - endocrine consult        Pulmonary infiltrates on CXR    - cultures from BAL in progress.   -Continue cefepime and vanco        Depression    - continue current at home depression regimen.  - will consider psych if necessary          Hypothyroidism due to Hashimoto's thyroiditis    - continue synthroid at home dose  - TSH 12.4, T4 4.4   - subclinical hypothyroidism.             Tim Mao NP  Heart Transplant  [facility]

## 2018-06-05 NOTE — SUBJECTIVE & OBJECTIVE
Interval History: No acute overnight event. Wants to get back to rehab.    Scheduled Meds:   sodium chloride 0.9%   Intravenous Once    aspirin  81 mg Oral Daily    cetirizine  5 mg Oral Daily    enoxaparin  30 mg Subcutaneous Daily    epoetin jose miguel (PROCRIT) injection  10,000 Units Intravenous Every Mon, Wed, Fri    escitalopram oxalate  20 mg Oral Daily    gabapentin  100 mg Oral TID    insulin detemir U-100  6 Units Subcutaneous BID    levothyroxine  137 mcg Oral Before breakfast    midodrine  10 mg Oral TID WM    mycophenolate  250 mg Oral BID    pantoprazole  40 mg Oral Daily    pravastatin  40 mg Oral Daily    predniSONE  2.5 mg Oral Daily    QUEtiapine  50 mg Oral QHS    tacrolimus  5 mg Oral BID     PRN Meds:sodium chloride, sodium chloride 0.9%, acetaminophen, ALPRAZolam, benzonatate, dextrose 50%, dextrose 50%, fentaNYL, glucagon (human recombinant), glucose, glucose, insulin aspart U-100, levalbuterol, lidocaine HCL 2%, midodrine, ondansetron, oxyCODONE, promethazine (PHENERGAN) IVPB    Review of patient's allergies indicates:   Allergen Reactions    No known drug allergies      Objective:     Vital Signs (Most Recent):  Temp: 97.5 °F (36.4 °C) (06/05/18 0800)  Pulse: 103 (06/05/18 1114)  Resp: 18 (06/05/18 0800)  BP: (!) 98/52 (06/05/18 0800)  SpO2: 99 % (06/05/18 0800) Vital Signs (24h Range):  Temp:  [97.5 °F (36.4 °C)-98.9 °F (37.2 °C)] 97.5 °F (36.4 °C)  Pulse:  [] 103  Resp:  [14-33] 18  SpO2:  [86 %-100 %] 99 %  BP: ()/(48-72) 98/52     Patient Vitals for the past 72 hrs (Last 3 readings):   Weight   06/05/18 0525 77 kg (169 lb 12.1 oz)   06/04/18 0400 73.3 kg (161 lb 9.6 oz)   06/03/18 0400 74 kg (163 lb 2.3 oz)     Body mass index is 26.59 kg/m².      Intake/Output Summary (Last 24 hours) at 06/05/18 1128  Last data filed at 06/05/18 0800   Gross per 24 hour   Intake              650 ml   Output                1 ml   Net              649 ml          Telemetry: no event  noted    Physical Exam   Constitutional: He is oriented to person, place, and time. He appears well-developed and well-nourished.   HENT:   Head: Normocephalic.   Eyes: Pupils are equal, round, and reactive to light.   Neck: Normal range of motion. Neck supple.   Cardiovascular: Normal rate and regular rhythm.    Pulmonary/Chest: Effort normal.   Decreased BLL   Abdominal: Soft. Bowel sounds are normal.   Musculoskeletal: Normal range of motion.   Neurological: He is alert and oriented to person, place, and time.   Skin: Skin is warm and dry.   Psychiatric: He has a normal mood and affect. His behavior is normal.   Nursing note and vitals reviewed.     Significant Labs:  CBC:    Recent Labs  Lab 06/03/18 0455 06/04/18  0509 06/05/18  0500   WBC 4.39 5.28 3.66*   RBC 2.73* 2.79* 2.74*   HGB 8.3* 8.4* 8.2*   HCT 27.4* 28.1* 27.0*    230 196   * 101* 99*   MCH 30.4 30.1 29.9   MCHC 30.3* 29.9* 30.4*     BNP:  No results for input(s): BNP in the last 168 hours.    Invalid input(s): BNPTRIAGELBLO  CMP:    Recent Labs  Lab 06/03/18 0455 06/04/18  0509 06/05/18  0500   * 138* 149*   CALCIUM 8.4* 8.7 8.3*   ALBUMIN 2.1* 2.2* 2.0*   PROT 5.4* 5.9* 5.3*    138 136   K 3.8 4.4 3.4*   CO2 25 19* 25    105 100   BUN 9 15 10   CREATININE 1.5* 2.9* 1.9*   ALKPHOS 125 119 117   ALT 10 11 7*   AST 15 32 15   BILITOT 0.5 0.4 0.4      Coagulation:   No results for input(s): PT, INR, APTT in the last 168 hours.  LDH:  No results for input(s): LDH in the last 72 hours.  Microbiology:  Microbiology Results (last 7 days)     Procedure Component Value Units Date/Time    AFB Culture & Smear [[id number]] Collected:  06/01/18 1157    Order Status:  Completed Specimen:  Body Fluid from Lung, [place] Updated:  06/04/18 1455     AFB Culture & Smear Culture in progress     AFB CULTURE STAIN No acid fast bacilli seen.    Culture, Respiratory [[id number]] Collected:  06/01/18 1156    Order Status:  Completed  Specimen:  Respiratory from BAL, LLL Updated:  06/04/18 0917     Respiratory Culture Normal respiratory hank     Gram Stain (Respiratory) <10 epithelial cells per low power field.     Gram Stain (Respiratory) Rare WBC's     Gram Stain (Respiratory) No organisms seen    Blood culture #2 **CANNOT BE ORDERED STAT** [[id number]] Collected:  05/29/18 0319    Order Status:  Completed Specimen:  Blood from Peripheral, Hand, Left Updated:  06/03/18 0612     Blood Culture, Routine No growth after 5 days.    Blood culture #1 **CANNOT BE ORDERED STAT** [[id number]] Collected:  05/29/18 0303    Order Status:  Completed Specimen:  Blood from Peripheral, Wrist, Right Updated:  06/03/18 0612     Blood Culture, Routine No growth after 5 days.    C Diff Toxin by PCR [[id number]] Collected:  06/01/18 1514    Order Status:  Completed Updated:  06/02/18 0404     C. diff PCR Negative    Clostridium difficile EIA [[id number]]  (Abnormal) Collected:  06/01/18 1514    Order Status:  Completed Specimen:  Stool from Stool Updated:  06/02/18 0319     C. diff Antigen Positive (A)     C difficile Toxins A+B, EIA Negative     Comment: Testing not recommended for children <24 months old.       KOH prep [[id number]] Collected:  06/01/18 1157    Order Status:  Completed Specimen:  Body Fluid from Lung, [place] Updated:  06/01/18 1731     KOH Prep No yeast or fungal elements seen    Narrative:       Bronchial Wash    Respiratory Viral Panel by PCR [facility]; Sputum (BAL) [[id number]] Collected:  06/01/18 1157    Order Status:  Sent Specimen:  Nasopharyngeal Updated:  06/01/18 1458    Fungus culture [[id number]] Collected:  06/01/18 1157    Order Status:  Sent Specimen:  Body Fluid from Lung, [place] Updated:  06/01/18 1458    Culture, Respiratory with Gram Stain [[id number]] Collected:  06/01/18 1156    Order Status:  Canceled Specimen:  Respiratory from BAL, LLL Updated:  06/01/18 1457    Gram stain [[id number]] Collected:  06/01/18 1157    Order Status:   Canceled Specimen:  Body Fluid from Lung, [place] Updated:  06/01/18 1456    Cryptococcal antigen [[id number]] Collected:  05/30/18 1222    Order Status:  Completed Specimen:  Blood from Blood Updated:  05/31/18 1228     Cryptococcal Ag, Blood Negative    Respiratory Viral Panel by PCR [facility]; Nasal Swab [[id number]] Collected:  05/29/18 2333    Order Status:  Completed Specimen:  Respiratory Updated:  05/31/18 1042     Respiratory Virus Panel, source Nasal Swab     RVP - Adenovirus Not Detected     Comment: Detects Serotypes B and E. Detection of Serotype C may   be limited. If Adenovirus infection is suspected and a   Not Detected result is returned the sample should be   re-tested for Adenovirus using an independent method  (e.g. [employer] Adenovirus Quantitative Real-Time  PCR test.          Enterovirus Not Detected     Comment: Cross-reactivity has been observed between certain Rhinovirus  strains and the Enterovirus assay.          Human Bocavirus Not Detected     Human Coronavirus Not Detected     Comment: The Human Coronavirus assay detects Human coronavirus types  229E, OC43,NL63 and HKU1.          RVP - Human Metapneumovirus (hMPV) Not Detected     RVP - Influenza A Not Detected     Influenza A - U3P2-50 Not Detected     RVP - Influenza B Not Detected     Parainfluenza Not Detected     Respiratory Syncytial VirusVirus (RSV) A Not Detected     Comment: The Respiratory Syncytial Viral assay detects types A and B,  however it does not distinguish between the two.          RVP - Rhinovirus Not Detected     Comment: Cross-Reactivity has been observed between certain   Rhinovirus strains and the Enterovirus assay.  Target Enriched Mulitplex Polymerase Chain Reaction (TEM-PCR)  allows for the detection of multiple pathogens out of a single  reaction.  This test was developed and its performance   characteristics determined by [employer].  It has not   been cleared or approved by the U.S.Food and Drug  Administration.  Results should be used in conjunction with clinical findings,   and should not form the sole basis for a diagnosis or treatment  decision.  TEM-PCR is a licensed technology of [employer].         Narrative:       Receiving Lab:->[facility]        I have reviewed all pertinent labs within the past 24 hours.    Estimated Creatinine Clearance: 46.4 mL/min (A) (based on SCr of 1.9 mg/dL (H)).

## 2018-06-05 NOTE — PT/OT/SLP PROGRESS
Occupational Therapy   Treatment    Name: Lv Crocker  MRN: [id number]  Admitting Diagnosis:  Fever       Recommendations:     Discharge Recommendations: rehabilitation facility  Discharge Equipment Recommendations:   (TBD at next level of care)  Barriers to discharge:  Inaccessible home environment, Decreased caregiver support (increased assist needed at current functional level )    Subjective     Communicated with: RN prior to session. Pt found resting with HOB elevated. Pt agreeable to therapy session.      Pain/Comfort:  · Pain Rating 1: 0/10  · Pain Rating Post-Intervention 1: 0/10    Patients cultural, spiritual, Rastafari conflicts given the current situation: none stated     Objective:     Patient found with: telemetry, pressure relief boots    General Precautions: Standard, fall   Orthopedic Precautions:LLE weight bearing as tolerated, RLE weight bearing as tolerated   Braces:  (B DARCO shoes )     Occupational Performance:    Bed Mobility:    · Patient completed Rolling/Turning to Right with minimum assistance and with side rail  · Patient completed Scooting/Bridging with stand by assistance scooting towards EOB  · Patient completed Supine to Sit with maximal assistance and HOB elevated      Functional Mobility/Transfers:  · Patient completed Sit <> Stand Transfer from EOB with moderate assistance and of 2 persons  with  no assistive device   · Patient completed Bed <> Chair Transfer using Stand Pivot technique with maximal assistance and of 2  persons with no assistive device; verbal cues for upright posture and hand placement     Activities of Daily Living:  · UB Dressing: moderate assistance to don gown like robe sitting EOB; min A to doff gown sitting EOB  · LB Dressing: total assistance to don/doff B  socks and B DARCO shoes     Patient left up in chair with all lines intact, call button in reach, RN notified and brother  present    [facility] 6 Click:  [facility] Total Score: 14    Treatment &  Education:  - Pt performed shld flexion, shld abduction, horizontal abduction, and external rotation of the R UE for x 10 reps with AAROM/PROM and x 10 sec hold at tolerated end range.   - Pt performed AROM for elbow flexion/extension for x15 reps B UE sitting supported  UIC.   - Pt tolerated sitting UIC for ~3hours   - OT returned for 2nd session in PM to assist pt back to bed.   - Whiteboard updated    Education:    Assessment:     Lv Crocker is a 42 y.o. male with a medical diagnosis of Fever.  He presents with performance deficits affecting function are weakness, impaired endurance, impaired sensation, gait instability, impaired functional mobilty, impaired balance, decreased upper extremity function, decreased lower extremity function, impaired self care skills, decreased ROM, edema, impaired skin. Pt performed functional sit<>stand with mod A x 2 persons with good body mechanics and pushing through B UEs. Pt continues to requires increased assist at current functional level and is an appropriate rehab candidate. Pt will continue to benefit from skilled OT in order to improve overall functional (I) with mobility and self-care tasks. Plan to return to IP rehab once pt is medically appropriate .     Rehab Prognosis:  good; patient would benefit from acute skilled OT services to address these deficits and reach maximum level of function.       Plan:     Patient to be seen 4 x/week to address the above listed problems via self-care/home management, therapeutic activities, therapeutic exercises  · Plan of Care Expires: 06/30/18  · Plan of Care Reviewed with: patient    This Plan of care has been discussed with the patient who was involved in its development and understands and is in agreement with the identified goals and treatment plan    GOALS:    Occupational Therapy Goals        Problem: Occupational Therapy Goal    Goal Priority Disciplines Outcome Interventions   Occupational Therapy Goal     OT,  PT/OT Ongoing (interventions implemented as appropriate)    Description:  Goals to be met by: 6/10/18     Patient will increase functional independence with ADLs by performing:    Feeding with Set-up Assistance.  UE Dressing with Minimal Assistance.  LE Dressing with Maximum Assistance.  Grooming while standing with Minimal Assistance.  Toileting from bedside commode with Moderate Assistance for hygiene and clothing management.   Toilet transfer to bedside commode with Minimal Assistance.  Upper extremity exercise program x10 reps per handout, with assistance as needed.                      Time Tracking:     OT Date of Treatment: 06/05/18  OT Start Time: 1033 OT returned: 1259  OT Stop Time: 1105 OT ended: 1308  OT Total Time (min): 32 min + 9 mins = 41 min total time      Billable Minutes:Self Care/Home Management 10  Therapeutic Exercise 15    Leonor Wagner, OT  6/5/2018

## 2018-06-05 NOTE — PROGRESS NOTES
HD completed around 2300. Pt's BP remains low (80s/50s with MAPs 60-64). Pt is asymptomatic. Pt is currently assigned a bed on TSU. Dr. Sotomayor notified. MD states OK to send pt to TSU with MAP < 65 as long as pt is asymptomatic. Bed will not be ready for another hour. BP will be taken q15 minutes. Will continue to monitor and update Dr. Sotomayor on pt's status prior to transfer.

## 2018-06-05 NOTE — CONSULTS
Inpatient consult to Physical Medicine Rehab  Consult performed by: SCAR CORCORAN  Consult ordered by: JULIA CHRISTINE  Reason for consult: assess rehab needs        Additional consult.  Reviewed patient history and current admission.  Rehab team following.  Full consult to follow.    VAISHALI Augustine, FNP-C  Physical Medicine & Rehabilitation   06/05/2018  Spectralink: [id number]

## 2018-06-05 NOTE — NURSING
Pt arrived to TSU from CMICU via bed. Stable condition. Low BP, MD aware. Received HD this shift. Wound care to affected toes completed. Other VSS. Telemetry on. 1L NC O2. Will cont to monitor. Brother at bedside.

## 2018-06-05 NOTE — SUBJECTIVE & OBJECTIVE
"Past Medical History:   Diagnosis Date    Anxiety     Arthritis     Ascites     Thought to be 2/2 heart tpx; liver bx 3/31/17 w/o significant fibrosis    Cardiomyopathy     Depression     Controlled "for the most part" on Lexipro    Encounter for blood transfusion     during transplant    GERD (gastroesophageal reflux disease)     Hashimoto's disease     History of CHF (congestive heart failure)     Previously EF 20% (prior to heart transplant); last EF 60%, PAP 41 as of 12/12/17; throught to be 2/2 genetics & DM1    History of coronary artery disease     H/o Coronary artery disease; resolved since heart transplant 2014    History of myocardial infarction     h/o MI x3 prior to heart transplant    HLD (hyperlipidemia) 6/11/2015    Hx of psychiatric care     Hypoglycemia unawareness in type 1 diabetes mellitus     Hypothyroid     Initially hyperthyroid 2/2 Hoshimoto's thyroiditis; now on levothyroxine 150 mcg qd    Pleural effusion due to another disorder     Thought to be 2/2 heart tpx; s/p PleuRx catheter placement and removal after 1-2 months    Psychiatric problem     Pulmonary hypertension assoc with unclear multi-factorial mechanisms 12/12/2017    PAP 41 (EF 60%) on ECHO 12/12/17    Syncope 6/30/2015    Type I diabetes mellitus, well controlled     Well controlled; Dx'd @5y/o; on N insulin 20U BID & Humalog 10U w/meals +SSI; Glu 89 11/9/17; A1c 7.2% 4/5/17    Unspecified essential hypertension 05/29/2014    Well controlled; Last /57 on 11/9/17     Past Surgical History:   Procedure Laterality Date    CARDIAC CATHETERIZATION      CARDIAC SURGERY      CHOLECYSTECTOMY      COLONOSCOPY N/A 3/13/2018    Procedure: COLONOSCOPY;  Surgeon: Tim Spencer MD;  Location: [facility] ([address] [place] [address]);  Service: Endoscopy;  Laterality: N/A;    CORONARY ANGIOPLASTY      FOOT SPLIT TRANSFER OF THE POSTERIOR TIBIALIS TENDON PROCEDURE      HEART TRANSPLANT  2014    KNEE SURGERY      PleuRx " catheter placement  01/11/2018    Plan for removal after 1-2 months by Dr. James in cardiothoracic surgery    s/p oht  November 2014    stent placemnet       Review of patient's allergies indicates:   Allergen Reactions    No known drug allergies        Scheduled Medications:    sodium chloride 0.9%   Intravenous Once    aspirin  81 mg Oral Daily    cetirizine  5 mg Oral Daily    enoxaparin  30 mg Subcutaneous Daily    epoetin jose miguel (PROCRIT) injection  10,000 Units Intravenous Every Mon, Wed, Fri    escitalopram oxalate  20 mg Oral Daily    gabapentin  100 mg Oral TID    insulin detemir U-100  6 Units Subcutaneous BID    levothyroxine  137 mcg Oral Before breakfast    midodrine  10 mg Oral TID WM    mycophenolate  250 mg Oral BID    pantoprazole  40 mg Oral Daily    pravastatin  40 mg Oral Daily    predniSONE  2.5 mg Oral Daily    QUEtiapine  50 mg Oral QHS    tacrolimus  5 mg Oral BID       PRN Medications: sodium chloride, sodium chloride 0.9%, acetaminophen, ALPRAZolam, benzonatate, dextrose 50%, dextrose 50%, fentaNYL, glucagon (human recombinant), glucose, glucose, insulin aspart U-100, levalbuterol, lidocaine HCL 2%, midodrine, ondansetron, oxyCODONE, promethazine (PHENERGAN) IVPB    Family History     Problem Relation (Age of Onset)    Cancer Brother (50)    Depression Mother    Diabetes Mother, Father, Brother, Son, Sister, Maternal Uncle, Paternal Aunt, Maternal Grandmother, Maternal Grandfather    Heart disease Mother, Brother    Hypertension Mother, Father, Brother    Kidney disease Mother, Father    Stroke Father, Brother    Vision loss Brother        Social History Main Topics    Smoking status: Never Smoker    Smokeless tobacco: Never Used    Alcohol use No    Drug use: No    Sexual activity: Yes     Partners: Female     Review of Systems   Constitutional: Positive for fatigue. Negative for chills and fever.   HENT: Negative for trouble swallowing and voice change.    Eyes:  Negative for photophobia and visual disturbance.   Respiratory: Negative for cough, shortness of breath and wheezing.    Cardiovascular: Negative for chest pain and palpitations.   Gastrointestinal: Negative for abdominal distention and vomiting.   Genitourinary: Negative for difficulty urinating and flank pain.   Musculoskeletal: Positive for gait problem. Negative for arthralgias and myalgias.   Skin: Negative for color change and rash.   Neurological: Positive for weakness. Negative for speech difficulty, numbness and headaches.   Psychiatric/Behavioral: Negative for agitation and confusion.     Objective:     Vital Signs (Most Recent):  Temp: 97.9 °F (36.6 °C) (06/05/18 1140)  Pulse: 108 (06/05/18 1200)  Resp: 18 (06/05/18 1140)  BP: (!) 94/50 (06/05/18 1140)  SpO2: (!) 91 % (06/05/18 1140)    Vital Signs (24h Range):  Temp:  [97.5 °F (36.4 °C)-98.9 °F (37.2 °C)] 97.9 °F (36.6 °C)  Pulse:  [] 108  Resp:  [14-33] 18  SpO2:  [86 %-100 %] 91 %  BP: ()/(48-72) 94/50     Body mass index is 26.59 kg/m².    Physical Exam   Constitutional: He is oriented to person, place, and time. He appears well-developed and well-nourished.   HENT:   Head: Normocephalic and atraumatic.   Eyes: EOM are normal. Pupils are equal, round, and reactive to light.   Neck: Neck supple.   Cardiovascular: Normal rate and regular rhythm.    Pulmonary/Chest: Effort normal. No respiratory distress.   Abdominal: Soft. There is no tenderness.   Musculoskeletal: Normal range of motion. He exhibits no deformity.   Neurological: He is alert and oriented to person, place, and time. No sensory deficit.   RUE: 4/5.  LUE: 4/5.  RLE: 4/5.  LLE: 4/5.     Skin: Skin is warm and dry.   Psychiatric: He has a normal mood and affect. His behavior is normal.     NEUROLOGICAL EXAMINATION:     MENTAL STATUS   Oriented to person, place, and time.     CRANIAL NERVES     CN III, IV, VI   Pupils are equal, round, and reactive to light.  Extraocular motions  are normal.       Diagnostic Results:   Labs: Reviewed  X-Ray: Reviewed  CT: Reviewed

## 2018-06-05 NOTE — ASSESSMENT & PLAN NOTE
-currently afebrile  -CT scan on admission + for congestion but equivocal for pneumonia with moderate R- pleural effusion.

## 2018-06-05 NOTE — PLAN OF CARE
Problem: Patient Care Overview  Goal: Plan of Care Review  Outcome: Ongoing (interventions implemented as appropriate)  Patient is AAOx3, requires 2 people for assistance. Patient denies any pain or nausea. PT and OT are working with the patient. Patient sat up in the chair for 2 hours today. Heel boots in place while in bed. Patient remains NSR on telemetry. Assisting the patient with turning every 2 hours. Monitoring the patient's blood sugar AC&HS. Reminded the patient to call for assistance. Call light and personal items are within reach.

## 2018-06-05 NOTE — PLAN OF CARE
Problem: Physical Therapy Goal  Goal: Physical Therapy Goal  Goals to be met by: 18     Patient will increase functional independence with mobility by performin. Supine to sit with Stand by Assistance. -not met  2. Sit to supine with Stand by Assistance. -not met  3. Rolling to Left and Right with [place]. -not met  4. Sit to stand transfer with Minimum Assistance. -not met  5. Gait  x 20 feet with Minimum Assistance. -not met  6. Ascend/descend 4 stairs with bilateral Handrails CGA. -not met  7. Lower extremity exercise program x20 reps per handout, with independence. -not met      Outcome: Ongoing (interventions implemented as appropriate)  Goals reviewed and remain appropriate. Pt progressing towards goals.    Petra Lake, PT, DPT   2018  [phone]

## 2018-06-05 NOTE — PROGRESS NOTES
4 hrs of hemodialysis completed, BP is on the low side but stable, heart rate increased to 118-120 beats/minute in the middle of HD but patient is asymptomatic, UF decreased to minimum.  NET UF REMOVED: 1.5 liters.  Left IJ permanent catheter flushed with Normal Saline, capped and secured.    Report given to primary nurse.

## 2018-06-05 NOTE — PROGRESS NOTES
Maintenance dialysis:   received patient on bed, awake, alert, oriented x4, hooked to oxygen via nasal cannula @1LPM saturating well, with left subclavian permanent catheter intact, access clean and dry, no clots aspirated, with good flow on both arterial and venous ports.    HD started with UF goal of 1-2 liters, UF profile 4. Plan of care discussed with primary nurse

## 2018-06-05 NOTE — CONSULTS
[facility]  Physical Medicine & Rehab  Consult Note    Patient Name: Lv Crocker  MRN: [id number]  Admission Date: 5/29/2018  Hospital Length of Stay: 7 days  Attending Physician: Jose A Lloyd MD     Inpatient consult to Physical Medicine & Rehabilitation  Consult performed by: Luna Zelaya NP  Consult requested by:  Jose A Lloyd MD    Reason for Consult:  assess rehabilitation needs  Consults  Subjective:     Principal Problem: Fever    HPI: Lv Crocker is a 42-year-old male with PMHx of DMI, HLD, GERD, depression, arthritis, hashimoto's thyroiditis, CAD, CHF, and cardiomyopathy s/p OHTX 11/18/2014 with early post-op course complicated by hemorrhagic tamponade s/p  washout, delayed closure, pericardial window with subsequent a-fib with RVR and CACHORRO and late course complicated by ABMR (in 4/2015 s/p rituxan, PLEX, and IVIG), C.diff/CMV reactivation, CKD IV, and restrictive cardiomyopathy with subsequent chronic/recurrent pleural effusions and ascites previously requiring monthly paracentesis and thoracentesis until he underwent VATS with pleurX catheter placement on 1/11/2018 with removal on 2/7/2018.  He recently has an admission from 03/208-05/2018 with a very complicated hospital course and was discharged to [facility]. Transferred from [facility] to [facility] ED on 5/29 with fever and was started on broad spectrum abx. BCX and resp cx NGTD; however, fungal culture is pending. Hospital course complicated by diarrhea (C.diff negative. Repeat CMV weakly positive), dry gangrene of foot, pleura effusion (s/p bronch and lavage on 6/1), pulmonary infiltrates (s/p bronch with cx in progress, cefepime and Vanc 7 day course completed), & depression.      Functional History:  Per chart review, patient lives in [place], [place], with his wife and 15-year-old son in a single story home with 7 steps to enter.  Prior to admission, he was (I) with ADLs and mobility; however, mobility limited by  "fatigue/endurance.  Reported over last ~6 months with limited activity.  Most days spent indoors, in which he ambulated to and from bathroom.  Completed inpatient rehab program after transplant in 2014 with subsequent home health therapy. Acutely transferred from [facility] on 5/18 for fever.  DME: rollator.      Hospital Course: 5/31/18: Evaluated by OT.  Bed mobility ModA .  Sit to stand and transfers MaxA. No gait.  UBD/grooming ModA and LBD MaxA. toileting totalA.  6/4/18: Participating with therapy. Bed mobility Sonia.  Sit to stand and transfers MaxA.  Ambulated 6 steps MaxA x 2 ppl with Darco shoe.  UBD ModA. LBD MaxA. Grooming Sonia. Feeding SBA.     Past Medical History:   Diagnosis Date    Anxiety     Arthritis     Ascites     Thought to be 2/2 heart tpx; liver bx 3/31/17 w/o significant fibrosis    Cardiomyopathy     Depression     Controlled "for the most part" on Lexipro    Encounter for blood transfusion     during transplant    GERD (gastroesophageal reflux disease)     Hashimoto's disease     History of CHF (congestive heart failure)     Previously EF 20% (prior to heart transplant); last EF 60%, PAP 41 as of 12/12/17; throught to be 2/2 genetics & DM1    History of coronary artery disease     H/o Coronary artery disease; resolved since heart transplant 2014    History of myocardial infarction     h/o MI x3 prior to heart transplant    HLD (hyperlipidemia) 6/11/2015    Hx of psychiatric care     Hypoglycemia unawareness in type 1 diabetes mellitus     Hypothyroid     Initially hyperthyroid 2/2 Hoshimoto's thyroiditis; now on levothyroxine 150 mcg qd    Pleural effusion due to another disorder     Thought to be 2/2 heart tpx; s/p PleuRx catheter placement and removal after 1-2 months    Psychiatric problem     Pulmonary hypertension assoc with unclear multi-factorial mechanisms 12/12/2017    PAP 41 (EF 60%) on ECHO 12/12/17    Syncope 6/30/2015    Type I diabetes mellitus, well " controlled     Well controlled; Dx'd @7y/o; on N insulin 20U BID & Humalog 10U w/meals +SSI; Glu 89 11/9/17; A1c 7.2% 4/5/17    Unspecified essential hypertension 05/29/2014    Well controlled; Last /57 on 11/9/17     Past Surgical History:   Procedure Laterality Date    CARDIAC CATHETERIZATION      CARDIAC SURGERY      CHOLECYSTECTOMY      COLONOSCOPY N/A 3/13/2018    Procedure: COLONOSCOPY;  Surgeon: Tim Spencer MD;  [facility] ([address] [place] [address]);  Service: Endoscopy;  Laterality: N/A;    CORONARY ANGIOPLASTY      FOOT SPLIT TRANSFER OF THE POSTERIOR TIBIALIS TENDON PROCEDURE      HEART TRANSPLANT  2014    KNEE SURGERY      PleuRx catheter placement  01/11/2018    Plan for removal after 1-2 months by Dr. James in cardiothoracic surgery    s/p oht  November 2014    stent placemnet       Review of patient's allergies indicates:   Allergen Reactions    No known drug allergies        Scheduled Medications:    sodium chloride 0.9%   Intravenous Once    aspirin  81 mg Oral Daily    cetirizine  5 mg Oral Daily    enoxaparin  30 mg Subcutaneous Daily    epoetin jose miguel (PROCRIT) injection  10,000 Units Intravenous Every Mon, Wed, Fri    escitalopram oxalate  20 mg Oral Daily    gabapentin  100 mg Oral TID    insulin detemir U-100  6 Units Subcutaneous BID    levothyroxine  137 mcg Oral Before breakfast    midodrine  10 mg Oral TID WM    mycophenolate  250 mg Oral BID    pantoprazole  40 mg Oral Daily    pravastatin  40 mg Oral Daily    predniSONE  2.5 mg Oral Daily    QUEtiapine  50 mg Oral QHS    tacrolimus  5 mg Oral BID       PRN Medications: sodium chloride, sodium chloride 0.9%, acetaminophen, ALPRAZolam, benzonatate, dextrose 50%, dextrose 50%, fentaNYL, glucagon (human recombinant), glucose, glucose, insulin aspart U-100, levalbuterol, lidocaine HCL 2%, midodrine, ondansetron, oxyCODONE, promethazine (PHENERGAN) IVPB    Family History     Problem Relation (Age of Onset)     Cancer Brother (50)    Depression Mother    Diabetes Mother, Father, Brother, Son, Sister, Maternal Uncle, Paternal Aunt, Maternal Grandmother, Maternal Grandfather    Heart disease Mother, Brother    Hypertension Mother, Father, Brother    Kidney disease Mother, Father    Stroke Father, Brother    Vision loss Brother        Social History Main Topics    Smoking status: Never Smoker    Smokeless tobacco: Never Used    Alcohol use No    Drug use: No    Sexual activity: Yes     Partners: Female     Review of Systems   Constitutional: Positive for fatigue. Negative for chills and fever.   HENT: Negative for trouble swallowing and voice change.    Eyes: Negative for photophobia and visual disturbance.   Respiratory: Negative for cough, shortness of breath and wheezing.    Cardiovascular: Negative for chest pain and palpitations.   Gastrointestinal: Negative for abdominal distention and vomiting.   Genitourinary: Negative for difficulty urinating and flank pain.   Musculoskeletal: Positive for gait problem. Negative for arthralgias and myalgias.   Skin: Negative for color change and rash. Positive for wounds.  Neurological: Positive for weakness. Negative for speech difficulty, numbness and headaches.   Psychiatric/Behavioral: Negative for agitation and confusion.     Objective:     Vital Signs (Most Recent):  Temp: 97.9 °F (36.6 °C) (06/05/18 1140)  Pulse: 108 (06/05/18 1200)  Resp: 18 (06/05/18 1140)  BP: (!) 94/50 (06/05/18 1140)  SpO2: (!) 91 % (06/05/18 1140)    Vital Signs (24h Range):  Temp:  [97.5 °F (36.4 °C)-98.9 °F (37.2 °C)] 97.9 °F (36.6 °C)  Pulse:  [] 108  Resp:  [14-33] 18  SpO2:  [86 %-100 %] 91 %  BP: ()/(48-72) 94/50     Body mass index is 26.59 kg/m².    Physical Exam   Constitutional: He is oriented to person, place, and time. He appears well-developed and well-nourished.   HENT:   Head: Normocephalic and atraumatic.   Eyes: EOM are normal. Pupils are equal, round, and reactive to  light.   Neck: Neck supple.   Cardiovascular: Normal rate and regular rhythm.    Pulmonary/Chest: Effort normal. No respiratory distress.   Abdominal: Soft. There is no tenderness.   Musculoskeletal: Normal range of motion. He exhibits no deformity.   Neurological: He is alert and oriented to person, place, and time. No sensory deficit.   RUE: 4/5.  LUE: 4/5.  RLE: 4/5.  LLE: 4/5.  Skin: Skin is warm and dry. Dressings noted to b/l heels.  Psychiatric: He has a normal mood and affect. His behavior is normal.         Diagnostic Results:   Labs: Reviewed  X-Ray: Reviewed  CT: Reviewed    Assessment/Plan:     * Fever    -currently afebrile  -CT scan on admission + for congestion but equivocal for pneumonia with moderate R- pleural effusion.          Diarrhea of presumed infectious origin    - improving per HTS and etiology possibly iatrogenic  - C- diff negative. Repeating CMV today.           Pulmonary infiltrates on CXR    -s/p bronch on 6/3  -cx in progress  -on IV Vanc and Cefepime         ESRD (end stage renal disease)    -HD m,w,f        Pleural effusion    -s/p bronch and lavage on 6/1        Heart transplanted      -S/p OHTX 11/18/2014 with early post-op course complicated by hemorrhagic tamponade s/p  washout, delayed closure, pericardial window with subsequent a-fib with RVR and CACHORRO and late course complicated by ABMR (in 4/2015 s/p rituxan, PLEX, and IVIG), C.diff/CMV reactivation, CKD, and restrictive cardiomyopathy with subsequent chronic/recurrent pleural effusions and ascites previously requiring monthly paracentesis and thoracentesis until he underwent VATS with pleurX catheter placement on 1/11/2018 with removal on 2/7/2018        Physical debility     -  2/2 prolonged and acute hospital course  -  (I) ADLs and mobility prior to admission, limited by fatigue/endurance  Recommendations  -  Encourage mobility, OOB in chair, and early ambulation as appropriate  -  PT/OT evaluate and treat  -  Pain  management  -  Monitor for and prevent skin breakdown and pressure ulcers  · Early mobility, repositioning/weight shifting every 20-30 minutes when sitting, turn patient every 2 hours, proper mattress/overlay and chair cushioning, pressure relief/heel protector boots  -  DVT prophylaxis:  MARK, SCD             Antibiotics discontinued as 7 day course is complete. Participating with therapy. PM&R staff formal approval and insurance authorization pending.  Will follow patient.     Thank you for your consult.     Luna Zelaya NP  Department of Physical Medicine & Rehab  [facility]

## 2018-06-05 NOTE — PLAN OF CARE
Problem: Occupational Therapy Goal  Goal: Occupational Therapy Goal  Goals to be met by: 6/10/18     Patient will increase functional independence with ADLs by performing:    Feeding with Set-up Assistance.  UE Dressing with Minimal Assistance.  LE Dressing with Maximum Assistance.  Grooming while standing with Minimal Assistance.  Toileting from bedside commode with Moderate Assistance for hygiene and clothing management.   Toilet transfer to bedside commode with Minimal Assistance.  Upper extremity exercise program x10 reps per handout, with assistance as needed.     Outcome: Ongoing (interventions implemented as appropriate)  Continue POC     Leonor Wagner, OTR/L  [phone]  6/5/2018

## 2018-06-06 NOTE — PLAN OF CARE
Problem: Patient Care Overview  Goal: Plan of Care Review  Outcome: Ongoing (interventions implemented as appropriate)  Patient is AAOx3, requires 2 people for assistance. Patient denies any pain or nausea. PT and OT are working with the patient. Patient sat up in the chair for 1 hour today. Heel boots in place while in bed. Patient remains NSR on telemetry. Assisting the patient with turning every 2 hours. Monitoring the patient's blood sugar AC&HS. Patient is scheduled for HD this afternoon. Patient received PO K replacement. Reminded the patient to call for assistance. Call light and personal items are within reach.

## 2018-06-06 NOTE — SUBJECTIVE & OBJECTIVE
Interval History: No acute overnight event. Wants to get back to rehab.Would like to get to a rehab closer to home.     Scheduled Meds:   sodium chloride 0.9%   Intravenous Once    sodium chloride 0.9%   Intravenous Once    aspirin  81 mg Oral Daily    cetirizine  5 mg Oral Daily    enoxaparin  30 mg Subcutaneous Daily    epoetin jose miguel (PROCRIT) injection  10,000 Units Intravenous Every Mon, Wed, Fri    escitalopram oxalate  20 mg Oral Daily    gabapentin  100 mg Oral TID    insulin detemir U-100  6 Units Subcutaneous BID    levothyroxine  137 mcg Oral Before breakfast    midodrine  10 mg Oral TID WM    mycophenolate  250 mg Oral BID    pantoprazole  40 mg Oral Daily    pravastatin  40 mg Oral Daily    predniSONE  2.5 mg Oral Daily    QUEtiapine  50 mg Oral QHS    tacrolimus  5 mg Oral BID     PRN Meds:sodium chloride, sodium chloride 0.9%, sodium chloride 0.9%, acetaminophen, ALPRAZolam, benzonatate, dextrose 50%, dextrose 50%, fentaNYL, glucagon (human recombinant), glucose, glucose, insulin aspart U-100, levalbuterol, lidocaine HCL 2%, magnesium oxide, magnesium oxide, midodrine, ondansetron, oxyCODONE, potassium chloride 10%, potassium chloride 10%, potassium chloride 10%, potassium, sodium phosphates, potassium, sodium phosphates, potassium, sodium phosphates, promethazine (PHENERGAN) IVPB    Review of patient's allergies indicates:   Allergen Reactions    No known drug allergies      Objective:     Vital Signs (Most Recent):  Temp: 97.8 °F (36.6 °C) (06/06/18 0745)  Pulse: (!) 114 (06/06/18 0745)  Resp: 18 (06/06/18 0745)  BP: (!) 110/55 (06/06/18 0745)  SpO2: 95 % (06/06/18 0745) Vital Signs (24h Range):  Temp:  [97.8 °F (36.6 °C)-99.5 °F (37.5 °C)] 97.8 °F (36.6 °C)  Pulse:  [101-115] 114  Resp:  [16-18] 18  SpO2:  [91 %-95 %] 95 %  BP: ()/(50-62) 110/55     Patient Vitals for the past 72 hrs (Last 3 readings):   Weight   06/05/18 0525 77 kg (169 lb 12.1 oz)   06/04/18 0400 73.3 kg  (161 lb 9.6 oz)     Body mass index is 26.59 kg/m².      Intake/Output Summary (Last 24 hours) at 06/06/18 0805  Last data filed at 06/05/18 2300   Gross per 24 hour   Intake              680 ml   Output                0 ml   Net              680 ml        Telemetry: no event noted    Physical Exam   Constitutional: He is oriented to person, place, and time. He appears well-developed and well-nourished.   HENT:   Head: Normocephalic.   Eyes: Pupils are equal, round, and reactive to light.   Neck: Normal range of motion. Neck supple.   Cardiovascular: Normal rate and regular rhythm.    Pulmonary/Chest: Effort normal.   Decreased BLL   Abdominal: Soft. Bowel sounds are normal.   Musculoskeletal: Normal range of motion.   Neurological: He is alert and oriented to person, place, and time.   Skin: Skin is warm and dry.   Psychiatric: He has a normal mood and affect. His behavior is normal.   Nursing note and vitals reviewed.     Significant Labs:  CBC:    Recent Labs  Lab 06/04/18  0509 06/05/18  0500 06/06/18  0537   WBC 5.28 3.66* 4.45   RBC 2.79* 2.74* 2.75*   HGB 8.4* 8.2* 8.2*   HCT 28.1* 27.0* 27.3*    196 225   * 99* 99*   MCH 30.1 29.9 29.8   MCHC 29.9* 30.4* 30.0*     BNP:  No results for input(s): BNP in the last 168 hours.    Invalid input(s): BNPTRIAGELBLO  CMP:    Recent Labs  Lab 06/03/18  0455 06/04/18  0509 06/05/18  0500   * 138* 149*   CALCIUM 8.4* 8.7 8.3*   ALBUMIN 2.1* 2.2* 2.0*   PROT 5.4* 5.9* 5.3*    138 136   K 3.8 4.4 3.4*   CO2 25 19* 25    105 100   BUN 9 15 10   CREATININE 1.5* 2.9* 1.9*   ALKPHOS 125 119 117   ALT 10 11 7*   AST 15 32 15   BILITOT 0.5 0.4 0.4      Coagulation:   No results for input(s): PT, INR, APTT in the last 168 hours.  LDH:  No results for input(s): LDH in the last 72 hours.  Microbiology:  Microbiology Results (last 7 days)     Procedure Component Value Units Date/Time    AFB Culture & Smear [[id number]] Collected:  06/01/18 1157     Order Status:  Completed Specimen:  Body Fluid from Lung, Lingula Updated:  06/04/18 1455     AFB Culture & Smear Culture in progress     AFB CULTURE STAIN No acid fast bacilli seen.    Culture, Respiratory [[id number]] Collected:  06/01/18 1156    Order Status:  Completed Specimen:  Respiratory from BAL, LLL Updated:  06/04/18 0917     Respiratory Culture Normal respiratory hank     Gram Stain (Respiratory) <10 epithelial cells per low power field.     Gram Stain (Respiratory) Rare WBC's     Gram Stain (Respiratory) No organisms seen    Blood culture #2 **CANNOT BE ORDERED STAT** [[id number]] Collected:  05/29/18 0319    Order Status:  Completed Specimen:  Blood from Peripheral, Hand, Left Updated:  06/03/18 0612     Blood Culture, Routine No growth after 5 days.    Blood culture #1 **CANNOT BE ORDERED STAT** [[id number]] Collected:  05/29/18 0303    Order Status:  Completed Specimen:  Blood from Peripheral, Wrist, Right Updated:  06/03/18 0612     Blood Culture, Routine No growth after 5 days.    C Diff Toxin by PCR [[id number]] Collected:  06/01/18 1514    Order Status:  Completed Updated:  06/02/18 0404     C. diff PCR Negative    Clostridium difficile EIA [[id number]]  (Abnormal) Collected:  06/01/18 1514    Order Status:  Completed Specimen:  Stool from Stool Updated:  06/02/18 0319     C. diff Antigen Positive (A)     C difficile Toxins A+B, EIA Negative     Comment: Testing not recommended for children <24 months old.       KOH prep [[id number]] Collected:  06/01/18 1157    Order Status:  Completed Specimen:  Body Fluid from Lung, Lingula Updated:  06/01/18 1731     KOH Prep No yeast or fungal elements seen    Narrative:       Bronchial Wash    Respiratory Viral Panel by PCR [facility]; Sputum (BAL) [[id number]] Collected:  06/01/18 1157    Order Status:  Sent Specimen:  Nasopharyngeal Updated:  06/01/18 1458    Fungus culture [[id number]] Collected:  06/01/18 1157    Order Status:  Sent Specimen:  Body Fluid from  Lung, [place] Updated:  06/01/18 1458    Culture, Respiratory with Gram Stain [[id number]] Collected:  06/01/18 1156    Order Status:  Canceled Specimen:  Respiratory from BAL, LLL Updated:  06/01/18 1457    Gram stain [[id number]] Collected:  06/01/18 1157    Order Status:  Canceled Specimen:  Body Fluid from Lung, [place] Updated:  06/01/18 1456    Cryptococcal antigen [[id number]] Collected:  05/30/18 1222    Order Status:  Completed Specimen:  Blood from Blood Updated:  05/31/18 1228     Cryptococcal Ag, Blood Negative    Respiratory Viral Panel by PCR [facility]; Nasal Swab [[id number]] Collected:  05/29/18 2333    Order Status:  Completed Specimen:  Respiratory Updated:  05/31/18 1042     Respiratory Virus Panel, source Nasal Swab     RVP - Adenovirus Not Detected     Comment: Detects Serotypes B and E. Detection of Serotype C may   be limited. If Adenovirus infection is suspected and a   Not Detected result is returned the sample should be   re-tested for Adenovirus using an independent method  (e.g. [employer] Adenovirus Quantitative Real-Time  PCR test.          Enterovirus Not Detected     Comment: Cross-reactivity has been observed between certain Rhinovirus  strains and the Enterovirus assay.          Human Bocavirus Not Detected     Human Coronavirus Not Detected     Comment: The Human Coronavirus assay detects Human coronavirus types  229E, OC43,NL63 and HKU1.          RVP - Human Metapneumovirus (hMPV) Not Detected     RVP - Influenza A Not Detected     Influenza A - C3P9-26 Not Detected     RVP - Influenza B Not Detected     Parainfluenza Not Detected     Respiratory Syncytial VirusVirus (RSV) A Not Detected     Comment: The Respiratory Syncytial Viral assay detects types A and B,  however it does not distinguish between the two.          RVP - Rhinovirus Not Detected     Comment: Cross-Reactivity has been observed between certain   Rhinovirus strains and the Enterovirus assay.  Target Enriched  Mulitplex Polymerase Chain Reaction (TEM-PCR)  allows for the detection of multiple pathogens out of a single  reaction.  This test was developed and its performance   characteristics determined by [employer].  It has not   been cleared or approved by the U.S.Food and Drug Administration.  Results should be used in conjunction with clinical findings,   and should not form the sole basis for a diagnosis or treatment  decision.  TEM-PCR is a licensed technology of [employer].         Narrative:       Receiving Lab:->[facility]        I have reviewed all pertinent labs within the past 24 hours.    Estimated Creatinine Clearance: 46.4 mL/min (A) (based on SCr of 1.9 mg/dL (H)).

## 2018-06-06 NOTE — PROGRESS NOTES
[facility]  Heart Transplant  Progress Note    Patient Name: Lv Crocker  MRN: [id number]  Admission Date: 5/29/2018  Hospital Length of Stay: 8 days  Attending Physician: Jose A Lloyd MD  Primary Care Provider: Philippe Mohr MD  Principal Problem:Fever    Subjective:     Interval History: No acute overnight event. Wants to get back to rehab.Would like to get to a rehab closer to home.     Scheduled Meds:   sodium chloride 0.9%   Intravenous Once    sodium chloride 0.9%   Intravenous Once    aspirin  81 mg Oral Daily    cetirizine  5 mg Oral Daily    enoxaparin  30 mg Subcutaneous Daily    epoetin jose miguel (PROCRIT) injection  10,000 Units Intravenous Every Mon, Wed, Fri    escitalopram oxalate  20 mg Oral Daily    gabapentin  100 mg Oral TID    insulin detemir U-100  6 Units Subcutaneous BID    levothyroxine  137 mcg Oral Before breakfast    midodrine  10 mg Oral TID WM    mycophenolate  250 mg Oral BID    pantoprazole  40 mg Oral Daily    pravastatin  40 mg Oral Daily    predniSONE  2.5 mg Oral Daily    QUEtiapine  50 mg Oral QHS    tacrolimus  5 mg Oral BID     PRN Meds:sodium chloride, sodium chloride 0.9%, sodium chloride 0.9%, acetaminophen, ALPRAZolam, benzonatate, dextrose 50%, dextrose 50%, fentaNYL, glucagon (human recombinant), glucose, glucose, insulin aspart U-100, levalbuterol, lidocaine HCL 2%, magnesium oxide, magnesium oxide, midodrine, ondansetron, oxyCODONE, potassium chloride 10%, potassium chloride 10%, potassium chloride 10%, potassium, sodium phosphates, potassium, sodium phosphates, potassium, sodium phosphates, promethazine (PHENERGAN) IVPB    Review of patient's allergies indicates:   Allergen Reactions    No known drug allergies      Objective:     Vital Signs (Most Recent):  Temp: 97.8 °F (36.6 °C) (06/06/18 0745)  Pulse: (!) 114 (06/06/18 0745)  Resp: 18 (06/06/18 0745)  BP: (!) 110/55 (06/06/18 0745)  SpO2: 95 % (06/06/18 0745) Vital Signs  (24h Range):  Temp:  [97.8 °F (36.6 °C)-99.5 °F (37.5 °C)] 97.8 °F (36.6 °C)  Pulse:  [101-115] 114  Resp:  [16-18] 18  SpO2:  [91 %-95 %] 95 %  BP: ()/(50-62) 110/55     Patient Vitals for the past 72 hrs (Last 3 readings):   Weight   06/05/18 0525 77 kg (169 lb 12.1 oz)   06/04/18 0400 73.3 kg (161 lb 9.6 oz)     Body mass index is 26.59 kg/m².      Intake/Output Summary (Last 24 hours) at 06/06/18 0805  Last data filed at 06/05/18 2300   Gross per 24 hour   Intake              680 ml   Output                0 ml   Net              680 ml        Telemetry: no event noted    Physical Exam   Constitutional: He is oriented to person, place, and time. He appears well-developed and well-nourished.   HENT:   Head: Normocephalic.   Eyes: Pupils are equal, round, and reactive to light.   Neck: Normal range of motion. Neck supple.   Cardiovascular: Normal rate and regular rhythm.    Pulmonary/Chest: Effort normal.   Decreased BLL   Abdominal: Soft. Bowel sounds are normal.   Musculoskeletal: Normal range of motion.   Neurological: He is alert and oriented to person, place, and time.   Skin: Skin is warm and dry.   Psychiatric: He has a normal mood and affect. His behavior is normal.   Nursing note and vitals reviewed.     Significant Labs:  CBC:    Recent Labs  Lab 06/04/18  0509 06/05/18  0500 06/06/18  0537   WBC 5.28 3.66* 4.45   RBC 2.79* 2.74* 2.75*   HGB 8.4* 8.2* 8.2*   HCT 28.1* 27.0* 27.3*    196 225   * 99* 99*   MCH 30.1 29.9 29.8   MCHC 29.9* 30.4* 30.0*     BNP:  No results for input(s): BNP in the last 168 hours.    Invalid input(s): BNPTRIAGELBLO  CMP:    Recent Labs  Lab 06/03/18  0455 06/04/18  0509 06/05/18  0500   * 138* 149*   CALCIUM 8.4* 8.7 8.3*   ALBUMIN 2.1* 2.2* 2.0*   PROT 5.4* 5.9* 5.3*    138 136   K 3.8 4.4 3.4*   CO2 25 19* 25    105 100   BUN 9 15 10   CREATININE 1.5* 2.9* 1.9*   ALKPHOS 125 119 117   ALT 10 11 7*   AST 15 32 15   BILITOT 0.5 0.4 0.4       Coagulation:   No results for input(s): PT, INR, APTT in the last 168 hours.  LDH:  No results for input(s): LDH in the last 72 hours.  Microbiology:  Microbiology Results (last 7 days)     Procedure Component Value Units Date/Time    AFB Culture & Smear [[id number]] Collected:  06/01/18 1157    Order Status:  Completed Specimen:  Body Fluid from Lung, Lingula Updated:  06/04/18 1455     AFB Culture & Smear Culture in progress     AFB CULTURE STAIN No acid fast bacilli seen.    Culture, Respiratory [[id number]] Collected:  06/01/18 1156    Order Status:  Completed Specimen:  Respiratory from BAL, LLL Updated:  06/04/18 0917     Respiratory Culture Normal respiratory hank     Gram Stain (Respiratory) <10 epithelial cells per low power field.     Gram Stain (Respiratory) Rare WBC's     Gram Stain (Respiratory) No organisms seen    Blood culture #2 **CANNOT BE ORDERED STAT** [[id number]] Collected:  05/29/18 0319    Order Status:  Completed Specimen:  Blood from Peripheral, Hand, Left Updated:  06/03/18 0612     Blood Culture, Routine No growth after 5 days.    Blood culture #1 **CANNOT BE ORDERED STAT** [[id number]] Collected:  05/29/18 0303    Order Status:  Completed Specimen:  Blood from Peripheral, Wrist, Right Updated:  06/03/18 0612     Blood Culture, Routine No growth after 5 days.    C Diff Toxin by PCR [[id number]] Collected:  06/01/18 1514    Order Status:  Completed Updated:  06/02/18 0404     C. diff PCR Negative    Clostridium difficile EIA [[id number]]  (Abnormal) Collected:  06/01/18 1514    Order Status:  Completed Specimen:  Stool from Stool Updated:  06/02/18 0319     C. diff Antigen Positive (A)     C difficile Toxins A+B, EIA Negative     Comment: Testing not recommended for children <24 months old.       KOH prep [[id number]] Collected:  06/01/18 1157    Order Status:  Completed Specimen:  Body Fluid from Lung, Lingula Updated:  06/01/18 1731     KOH Prep No yeast or fungal elements seen    Narrative:        Bronchial Wash    Respiratory Viral Panel by PCR [facility]; Sputum (BAL) [[id number]] Collected:  06/01/18 1157    Order Status:  Sent Specimen:  Nasopharyngeal Updated:  06/01/18 1458    Fungus culture [[id number]] Collected:  06/01/18 1157    Order Status:  Sent Specimen:  Body Fluid from Lung, Lingula Updated:  06/01/18 1458    Culture, Respiratory with Gram Stain [[id number]] Collected:  06/01/18 1156    Order Status:  Canceled Specimen:  Respiratory from BAL, LLL Updated:  06/01/18 1457    Gram stain [[id number]] Collected:  06/01/18 1157    Order Status:  Canceled Specimen:  Body Fluid from Lung, Lingula Updated:  06/01/18 1456    Cryptococcal antigen [[id number]] Collected:  05/30/18 1222    Order Status:  Completed Specimen:  Blood from Blood Updated:  05/31/18 1228     Cryptococcal Ag, Blood Negative    Respiratory Viral Panel by PCR [facility]; Nasal Swab [[id number]] Collected:  05/29/18 2333    Order Status:  Completed Specimen:  Respiratory Updated:  05/31/18 1042     Respiratory Virus Panel, source Nasal Swab     RVP - Adenovirus Not Detected     Comment: Detects Serotypes B and E. Detection of Serotype C may   be limited. If Adenovirus infection is suspected and a   Not Detected result is returned the sample should be   re-tested for Adenovirus using an independent method  (e.g. [employer] Adenovirus Quantitative Real-Time  PCR test.          Enterovirus Not Detected     Comment: Cross-reactivity has been observed between certain Rhinovirus  strains and the Enterovirus assay.          Human Bocavirus Not Detected     Human Coronavirus Not Detected     Comment: The Human Coronavirus assay detects Human coronavirus types  229E, OC43,NL63 and HKU1.          RVP - Human Metapneumovirus (hMPV) Not Detected     RVP - Influenza A Not Detected     Influenza A - S5T0-43 Not Detected     RVP - Influenza B Not Detected     Parainfluenza Not Detected     Respiratory Syncytial VirusVirus (RSV) A Not Detected      Comment: The Respiratory Syncytial Viral assay detects types A and B,  however it does not distinguish between the two.          RVP - Rhinovirus Not Detected     Comment: Cross-Reactivity has been observed between certain   Rhinovirus strains and the Enterovirus assay.  Target Enriched Mulitplex Polymerase Chain Reaction (TEM-PCR)  allows for the detection of multiple pathogens out of a single  reaction.  This test was developed and its performance   characteristics determined by [employer].  It has not   been cleared or approved by the U.S.Food and Drug Administration.  Results should be used in conjunction with clinical findings,   and should not form the sole basis for a diagnosis or treatment  decision.  TEM-PCR is a licensed technology of [employer].         Narrative:       Receiving Lab:->[facility]        I have reviewed all pertinent labs within the past 24 hours.    Estimated Creatinine Clearance: 46.4 mL/min (A) (based on SCr of 1.9 mg/dL (H)).      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 She denies him having any SOB, chest pain, diarrhea, nausea, vomiting. He doesnot make any urine since he has been non HD. Fever was noted to be as high as 101.6.She also notes that he has been coughing since being in ER but not before that.No SOB with PT at rehab.No dizziness or syncope. Wife reports he got an extra HD on Thursday because nephrology wanted to remove extra fluid.He has had a good appetite and tolerating PO and per wife has been working  with PT and getting stronger. He has finished his abx course from last visit already.    * Fever    - Resolved.  - CT scan on admission + for congestion but equivocal for pneumonia. Present of moderate R- pleural effusion.  - Bronch cultures NGTD.   - Blood Cultures and resp panel continues to be negative including RSV.  - CMV weakly positive. Will repeat today per ID recs.   - Stopped cefepime and vanco per ID as he has completed 7 day course.        Heart transplanted    -S/p OHT 2014  -Continue prednisone, cellcept and tacrolimus  -continue tacro 5/5 BID. Goal is 5-10  -Plan to adjust today as needed        Physical debility    - secondary to prolonged illness  - PT/OT in patient and d/c back to rehab once cleared          Diarrhea of presumed infectious origin    - Resolved  - could be from current abx  - C- diff negative. CMV pcr neg.   - Discussed with ID (Dr. Eric)        ESRD (end stage renal disease)    - on HD M, W, F.  - continue HD as scheduled. Appreciate nephrology assistance        Gangrene    - Dry gangrene developed previous admission with pressor use  - Will continue with betadine rx as current.   - Podiatry on board  - Foot XR and dopplers shows no changes concerning for infection          Pleural effusion    -Persistent right sided pleural effusion  -Ct chest show moderate amount.   -S/P bronch and lavage (6/1).          Type 1 diabetes mellitus with stage 3 chronic kidney disease    Insulin sliding scale  -continue detemir  - endocrine consult        Pulmonary infiltrates on CXR    - As above.        Depression    - continue current at home depression regimen.          Hypothyroidism due to Hashimoto's thyroiditis    - continue synthroid at home dose  - TSH 12.4, T4 4.4   - subclinical hypothyroidism.             Tim Mao NP  Heart Transplant  [facility]

## 2018-06-06 NOTE — PT/OT/SLP PROGRESS
Physical Therapy Treatment    Patient Name:  Lv Crocker   MRN:  [id number]    Recommendations:     Discharge Recommendations:  rehabilitation facility   Discharge Equipment Recommendations:  (TBD)   Barriers to discharge: Inaccessible home and Decreased caregiver support    Assessment:     Lv Crocker is a 42 y.o. male admitted with a medical diagnosis of Fever.  He presents with the following impairments/functional limitations:  weakness, impaired functional mobilty, gait instability, impaired endurance, impaired balance, impaired self care skills, decreased lower extremity function, decreased upper extremity function, pain, impaired skin, edema, decreased ROM.  Pt tolerated session c min c/o fatigue, BLE weakness, and pain.  Pt demo improved functional mobility on this date - improved gait distance and decreased assistance required for mobility.  Pt requiring min-mod A for bed mobility, min-max A for transfer from varying surfaces and mod A for gait.  Pt demo difficulty weight-shifting to LLE, B foot clearance, and decreased step length.  PT demo decreased B knee and ankle ROM d/t joint stiffness and muscle tightness from prolonged limited mobility.  Pt tolerated sitting up in chair for 1 hour on this date - continues to be limited by pain d/t sacral sores.  Pt encouraged to sit on EOB throughout day c assistance from nsg.  Pt would benefit from continued skilled acute PT 5x/wk to improve functional mobility.      Rehab Prognosis:  good; patient would benefit from acute skilled PT services to address these deficits and reach maximum level of function.      Recent Surgery: * No surgery found *      Plan:     During this hospitalization, patient to be seen 5 x/week to address the above listed problems via gait training, therapeutic activities, therapeutic exercises, neuromuscular re-education  · Plan of Care Expires:  06/30/18   Plan of Care Reviewed with: patient    Subjective     Communicated  "with RN and OT prior to session.  Patient found HOB elevated finishing breakfast upon PT entry to room, agreeable to treatment.      2nd visit - PT contacted by RN stating pt request to return to bed. Pt found UIC and brother present.    Chief Complaint: fatigue; weakness; pain  Patient comments/goals: "I was able to walk the full length of the parallel bars at rehab."  Pain/Comfort:  · Pain Rating 1: 0/10    Patients cultural, spiritual, Adventist conflicts given the current situation: none    Objective:     Patient found with: telemetry, pressure relief boots     General Precautions: Standard, fall   Orthopedic Precautions:RLE weight bearing as tolerated, LLE weight bearing as tolerated   Braces:  (B darco boots)     Functional Mobility:  · Bed Mobility:     · Rolling Right: contact guard assistance  · Scooting: minimum assistance  · Supine to Sit: moderate assistance for trunk support  · Sit to Supine: moderate assistance for BLE support  · Transfers:     · Sit to Stand:  minimum assistance with no AD x1 from bed  · Max A c no AD 2x from bedside chair  · Bed to Chair: moderate assistance with  no AD  using  Step Transfer  · Gait:  · 1st visit- 6 steps forward, 4 steps backward c no AD mod A  · 2nd visit- 6 steps c no AD mod A  · Demo decreased step length, difficulty c weight shifting towards LLE, difficulty c toe-floor clearance B, forward flexed posture requiring min v/c for correction.  · Balance: static standing (min A); dynamic standing (mod A)      AM-PAC 6 CLICK MOBILITY  Turning over in bed (including adjusting bedclothes, sheets and blankets)?: 3  Sitting down on and standing up from a chair with arms (e.g., wheelchair, bedside commode, etc.): 2  Moving from lying on back to sitting on the side of the bed?: 2  Moving to and from a bed to a chair (including a wheelchair)?: 2  Need to walk in hospital room?: 2  Climbing 3-5 steps with a railing?: 1  Total Score: 12       Therapeutic Activities and " Exercises:  Educated on PT role/POC; safety c mobility; benefits of OOB activities; sitting EOB c nsg throughout day; performing BLE exercises daily; progress - v/u  Static standing 3x1min  Sat EOB x5min - able to accept challenges  Pt O2 sats: sitting EOB on RA - 91-93%; sitting c 2L O2 - >94%; Post ambulation c 2L O2 - >94%  Mobility as stated above    1st visit: pt left UIC c call button and tray table within reach, all lines intact, RN notified, and brother present - PT pager number provided for nsg to contact when pt ready to return to bed    Patient left HOB elevated with all lines intact, call button in reach, RN notified and brother present.    GOALS:    Physical Therapy Goals        Problem: Physical Therapy Goal    Goal Priority Disciplines Outcome Goal Variances Interventions   Physical Therapy Goal     PT/OT, PT Ongoing (interventions implemented as appropriate)     Description:  Goals to be met by: 18     Patient will increase functional independence with mobility by performin. Supine to sit with Stand by Assistance. -not met  2. Sit to supine with Stand by Assistance. -not met  3. Rolling to Left and Right with [place]. -not met  4. Sit to stand transfer with Minimum Assistance. -not met  5. Gait  x 20 feet with Minimum Assistance. -not met  6. Ascend/descend 4 stairs with bilateral Handrails CGA. -not met  7. Lower extremity exercise program x20 reps per handout, with independence. -not met                       Time Tracking:     PT Received On: 18  PT Start Time: 839     PT Stop Time: 905  PT Total Time (min): 26 min ; 8 mins (1000 - 1008)    Billable Minutes: Gait Training 12 mins + 8 mins and Therapeutic Activity 14 mins    Treatment Type: Treatment  PT/PTA: PT     PTA Visit Number: 0     Corey Hernández, PT  2018

## 2018-06-06 NOTE — PLAN OF CARE
Problem: Occupational Therapy Goal  Goal: Occupational Therapy Goal  Goals to be met by: 6/10/18     Patient will increase functional independence with ADLs by performing:    Feeding with Set-up Assistance.  UE Dressing with Minimal Assistance.  LE Dressing with Maximum Assistance.  Grooming while standing with Minimal Assistance.  Toileting from bedside commode with Moderate Assistance for hygiene and clothing management.   Toilet transfer to bedside commode with Minimal Assistance.  Upper extremity exercise program x10 reps per handout, with assistance as needed.     Outcome: Ongoing (interventions implemented as appropriate)  Con't with plan  Delia To, OTR

## 2018-06-06 NOTE — PLAN OF CARE
Problem: Patient Care Overview  Goal: Plan of Care Review  Pt AAOx4, bed low locked, call light within reach, wearing nonskid socks. Pt instructed to use call light for assistance, pt verbalizes understanding.   Pt denies any pain or discomfort at this time. Tele . /, 276, covered with ss insulin. Betadine applied to R/L toes. Pt remains free from injury/harm at this time. Afebrile. See flowsheet for full assessment/VS.

## 2018-06-06 NOTE — ASSESSMENT & PLAN NOTE
- Resolved.  - CT scan on admission + for congestion but equivocal for pneumonia. Present of moderate R- pleural effusion.  - Bronch cultures NGTD.   - Blood Cultures and resp panel continues to be negative including RSV.  - CMV weakly positive. Will repeat today per ID recs.   - Stopped cefepime and vanco per ID as he has completed 7 day course.

## 2018-06-06 NOTE — PLAN OF CARE
Problem: Patient Care Overview  Goal: Plan of Care Review  Outcome: Ongoing (interventions implemented as appropriate)  3.5 hour dialysis complete.  Blood rinsed back.  LIJ permcath flushed with normal saline, both lumens filled with heparin, capped and taped.  Net UF 2L.  Procrit given.  Tolerated well.  Transported from dialysis unit to room 8096 via bed by 2 transporters.

## 2018-06-06 NOTE — PHYSICIAN QUERY
PT Name: Lv Crocker  MR #: [id number]     Physician Query Form - Diagnosis Clarification      CDS/: Dhara Oviedo               Contact information: [email or web address]    This form is a permanent document in the medical record.     Query Date: June 6, 2018    By submitting this query, we are merely seeking further clarification of documentation.  Please utilize your independent clinical judgment when addressing the question(s) below.     The medical record contains the following:      Findings Supporting Clinical Information Location in Medical Record   - possibly from Pneumonia     - Documented on Day two of stay (tmax 102.4)  - possibly from Pneumonia  - afebrile for > 48 hrs  - Blood Cultures and resp panel continues to be  Negative including RSV  - Bronch cultures in progress.   - CT scan  On admission + for congestion but equivocal for pneumonia. Present of moderate R- pleural effusion  - No leukocytosis but immunocompromised     Fever - Resolved.  - CT scan on admission + for congestion but equivocal for pneumonia. Present of moderate R- pleural effusion.  - Bronch cultures in progress. NGTD.   - Blood Cultures and resp panel continues to be  Negative including RSV.  - CMV weakly positive. Will repeat today per ID recs.   - Stopped cefepime and vanco per ID as he has completed 7 day course.       Transplant PN 6/3    Transplant PN 6/3                      Transplant PN 6/3     Please clarify if the ___pneumonia_______ diagnosis has been:    [  ] Ruled In  [  ] Ruled In, Now Resolved  [ x ] Ruled Out  [  ] Clinically insignificant  [  ] Clinically undetermined  [  ] Other/Clarification of findings (please specify)_______________________________    Please document in your progress notes daily for the duration of treatment, until resolved, and include in your discharge summary.

## 2018-06-06 NOTE — PROGRESS NOTES
Discharge planning    Per pt choice, [facility]US referred pt to [facility] in [place] ph [phone], fax [phone] and Pamela is [facility] [phone], fax [phone]. Following and available.

## 2018-06-06 NOTE — PROGRESS NOTES
Patient received from floor in own bed with report from [facility] Benitez.  Maintenance dialysis began per orders via left IJ tunneled catheter.

## 2018-06-06 NOTE — PROGRESS NOTES
[facility]  Physical Medicine & Rehab  Progress Note    Patient Name: Lv Crocker  MRN: [id number]  Admission Date: 5/29/2018  Length of Stay: 8 days  Attending Physician: Jose A Lloyd MD    Subjective:     Principal Problem:Fever    Hospital Course:   5/31/18: Evaluated by OT.  Bed mobility ModA .  Sit to stand and transfers MaxA. No gait.  UBD/grooming ModA and LBD MaxA. toileting totalA.  6/4/18: Participating with therapy. Bed mobility Sonia.  Sit to stand and transfers MaxA.  Ambulated 6 steps MaxA x 2 ppl with Darco shoe.  UBD ModA. LBD MaxA. Grooming Sonia. Feeding SBA.   6/5/18: Participated with therapy. Bed mobility SBA-MaxA.  Sit to stand ModA x 2 ppl and transfers MaxA x 2ppl.  Ambulated small steps MaxA x 2 ppl with Darco shoe.  UBD ModA. LBD MaxA.     Interval History 6/6/2018:  Patient is seen for follow-up rehab evaluation and recommendations: Participating with therapy.     HPI, Past Medical, Family, and Social History remains the same as documented in the initial encounter.    Scheduled Medications:    sodium chloride 0.9%   Intravenous Once    sodium chloride 0.9%   Intravenous Once    aspirin  81 mg Oral Daily    cetirizine  5 mg Oral Daily    enoxaparin  30 mg Subcutaneous Daily    epoetin jose miguel (PROCRIT) injection  10,000 Units Intravenous Every Mon, Wed, Fri    escitalopram oxalate  20 mg Oral Daily    gabapentin  100 mg Oral TID    insulin detemir U-100  6 Units Subcutaneous BID    levothyroxine  137 mcg Oral Before breakfast    midodrine  10 mg Oral TID WM    mycophenolate  250 mg Oral BID    pantoprazole  40 mg Oral Daily    pravastatin  40 mg Oral Daily    predniSONE  2.5 mg Oral Daily    QUEtiapine  50 mg Oral QHS    tacrolimus  5 mg Oral BID       Diagnostic Results: Labs: Reviewed    PRN Medications: sodium chloride, sodium chloride 0.9%, sodium chloride 0.9%, acetaminophen, ALPRAZolam, benzonatate, dextrose 50%, dextrose 50%, fentaNYL,  glucagon (human recombinant), glucose, glucose, insulin aspart U-100, levalbuterol, lidocaine HCL 2%, midodrine, ondansetron, oxyCODONE, promethazine (PHENERGAN) IVPB    Review of Systems   Constitutional: Positive for fatigue. Negative for chills and fever.   HENT: Negative for trouble swallowing and voice change.    Eyes: Negative for photophobia and visual disturbance.   Respiratory: Negative for cough, shortness of breath and wheezing.    Cardiovascular: Negative for chest pain and palpitations.   Gastrointestinal: Negative for abdominal distention and vomiting.   Genitourinary: Negative for difficulty urinating and flank pain.   Musculoskeletal: Positive for gait problem. Negative for arthralgias and myalgias.   Skin: Positive for wound. Negative for color change and rash.   Neurological: Positive for weakness. Negative for speech difficulty, numbness and headaches.   Psychiatric/Behavioral: Negative for agitation and confusion.     Objective:     Vital Signs (Most Recent):  Temp: 97.9 °F (36.6 °C) (06/06/18 1140)  Pulse: 100 (06/06/18 1200)  Resp: 18 (06/06/18 1140)  BP: (!) 91/56 (06/06/18 1140)  SpO2: 99 % (06/06/18 1140)    Vital Signs (24h Range):  Temp:  [97.8 °F (36.6 °C)-99.5 °F (37.5 °C)] 97.9 °F (36.6 °C)  Pulse:  [] 100  Resp:  [16-18] 18  SpO2:  [92 %-99 %] 99 %  BP: ()/(55-62) 91/56     Physical Exam   Constitutional: He is oriented to person, place, and time. He appears well-developed and well-nourished.   HENT:   Head: Normocephalic and atraumatic.   Eyes: EOM are normal. Pupils are equal, round, and reactive to light.   Neck: Neck supple.   Cardiovascular: Normal rate and regular rhythm.    Pulmonary/Chest: Effort normal. No respiratory distress.   Abdominal: Soft. There is no tenderness.   Musculoskeletal: Normal range of motion. He exhibits no deformity.   Neurological: He is alert and oriented to person, place, and time. No sensory deficit.   RUE: 4/5.  LUE: 4/5.  RLE: 4/5.  LLE:  4/5.  Skin: Skin is warm and dry.   Psychiatric: He has a normal mood and affect. His behavior is normal.     Assessment/Plan:      * Fever    -currently afebrile  -CT scan on admission + for congestion but equivocal for pneumonia with moderate R- pleural effusion  -no abx , C.diff negative and CMV undectable          Diarrhea of presumed infectious origin    - improving per HTS and etiology possibly iatrogenic  - C- diff negative. CMV undetectable           Pulmonary infiltrates on CXR    -s/p bronch on 6/3  -cx in progress  -Vanc and Cefepime d/c'd        Necrotic toes    -dressed        ESRD (end stage renal disease)    -HD m,w,f        Pleural effusion    -s/p bronch and lavage on 6/1        Heart transplanted      -S/p OHTX 11/18/2014 with early post-op course complicated by hemorrhagic tamponade s/p  washout, delayed closure, pericardial window with subsequent a-fib with RVR and CACHORRO and late course complicated by ABMR (in 4/2015 s/p rituxan, PLEX, and IVIG), C.diff/CMV reactivation, CKD, and restrictive cardiomyopathy with subsequent chronic/recurrent pleural effusions and ascites previously requiring monthly paracentesis and thoracentesis until he underwent VATS with pleurX catheter placement on 1/11/2018 with removal on 2/7/2018        Physical debility     -  2/2 prolonged and acute hospital course  -  (I) ADLs and mobility prior to admission, limited by fatigue/endurance  Recommendations  -  Encourage mobility, OOB in chair, and early ambulation as appropriate  -  PT/OT evaluate and treat  -  Pain management  -  Monitor for and prevent skin breakdown and pressure ulcers  · Early mobility, repositioning/weight shifting every 20-30 minutes when sitting, turn patient every 2 hours, proper mattress/overlay and chair cushioning, pressure relief/heel protector boots  -  DVT prophylaxis:  MARK, SCD         Recommend Inpatient Rehab.  IRF preference per patient/[facility].  Barriers to IRF admission:  Insurance  authorization.       Luna Zelaya NP  Department of Physical Medicine & Rehab   [facility]

## 2018-06-06 NOTE — NURSING
Rounds Report: Attended interdisciplinary rounds this morning with the transplant team including SW, physicians, fellows,  mid-level providers, and transplant coordinators.  Discussed plan of care, including DC date possibly next week. Pt's wife requested pt go to rehab in [place] now that pt can sit in chair. SW to look into dialysis and rehab in [place].

## 2018-06-06 NOTE — PROGRESS NOTES
Update    Pt presents in room with brother David. Pt aaox4 with pleasant affect. Pt reports coping well and denies further needs, questions, concerns at this time and none indicated. Updated pt on referrals to [facility] and [facility] in [place]. Pt states understanding. Providing ongoing psychosocial and counseling support, education, resources, assistance and discharge planning as indicated. Following and available.

## 2018-06-06 NOTE — PLAN OF CARE
Problem: Physical Therapy Goal  Goal: Physical Therapy Goal  Goals to be met by: 18     Patient will increase functional independence with mobility by performin. Supine to sit with Stand by Assistance. -not met  2. Sit to supine with Stand by Assistance. -not met  3. Rolling to Left and Right with [place]. -not met  4. Sit to stand transfer with Minimum Assistance. -not met  5. Gait  x 20 feet with Minimum Assistance. -not met  6. Ascend/descend 4 stairs with bilateral Handrails CGA. -not met  7. Lower extremity exercise program x20 reps per handout, with independence. -not met      Outcome: Ongoing (interventions implemented as appropriate)  Progressing towards goals    Corey Hernández DPT  2018

## 2018-06-06 NOTE — ASSESSMENT & PLAN NOTE
-currently afebrile  -CT scan on admission + for congestion but equivocal for pneumonia with moderate R- pleural effusion  -no abx , C.diff negative and CMV undectable

## 2018-06-06 NOTE — PT/OT/SLP PROGRESS
Occupational Therapy   Treatment    Name: Lv Crocker  MRN: [id number]  Admitting Diagnosis:  Fever       Recommendations:     Discharge Recommendations: rehabilitation facility  Discharge Equipment Recommendations:     Barriers to discharge:  Inaccessible home environment, Decreased caregiver support    Subjective     Communicated with: nsg prior to session. Cc with PT  Pain/Comfort:  · Pain Rating 1: 0/10    Patients cultural, spiritual, Lutheran conflicts given the current situation: none    Objective:     Patient found with: telemetry, pressure relief boots    General Precautions: Standard, fall   Orthopedic Precautions:RLE weight bearing as tolerated, LLE weight bearing as tolerated   Braces:       Occupational Performance:    Bed Mobility:    · Sup to sit with mod assist     Functional Mobility/Transfers:  · Sit to stand with min assist from bed  · Sit to stand from bedside chair with pillow in seat with mod assist x 2  · Functional Mobility: took 6 steps to bedside chair with min assist x 2  · Up in bedside chair x ~1 hr 15 min, therapy returned for chair back to bed with min assist x 2, sit to stand from chair with mod assist x 2  · Good balance sitting EOB  · Scooting laterally at EOB with CGA    Activities of Daily Living:  · Donning socks with total assist, educated on figure four technique and LE ROM/stretches to assist with task  · UE dress with gown with mod assist sitting EOB  · Feeding with setup assist sitting up in bed    Patient left HOB elevated with call button in reach    [facility] 6 Click:  [facility] Total Score: 14    Treatment & Education:  Performed above activity, performed AROM RUE as tolerated and educated on exercises, educated on POC and importance of sitting up more and requesting nsg to assist with EOB with assist.   Education:    Assessment:     Lv Crocker is a 42 y.o. male with a medical diagnosis of Fever.  He presents with improving mobility, strength and ADL's.   Performance deficits affecting function are weakness, impaired endurance, impaired self care skills, impaired functional mobilty, impaired balance, decreased lower extremity function, decreased upper extremity function, impaired cardiopulmonary response to activity.      Rehab Prognosis:  good; patient would benefit from acute skilled OT services to address these deficits and reach maximum level of function.       Plan:     Patient to be seen 4 x/week to address the above listed problems via self-care/home management, therapeutic activities, therapeutic exercises  · Plan of Care Expires: 06/30/18  · Plan of Care Reviewed with: patient    This Plan of care has been discussed with the patient who was involved in its development and understands and is in agreement with the identified goals and treatment plan    GOALS:    Occupational Therapy Goals        Problem: Occupational Therapy Goal    Goal Priority Disciplines Outcome Interventions   Occupational Therapy Goal     OT, PT/OT Ongoing (interventions implemented as appropriate)    Description:  Goals to be met by: 6/10/18     Patient will increase functional independence with ADLs by performing:    Feeding with Set-up Assistance.  UE Dressing with Minimal Assistance.  LE Dressing with Maximum Assistance.  Grooming while standing with Minimal Assistance.  Toileting from bedside commode with Moderate Assistance for hygiene and clothing management.   Toilet transfer to bedside commode with Minimal Assistance.  Upper extremity exercise program x10 reps per handout, with assistance as needed.                      Time Tracking:     OT Date of Treatment: 06/06/18  OT Start Time: 0839  OT Stop Time: 0905  OT Total Time (min): 26 min    Billable Minutes:Self Care/Home Management 13 min  Therapeutic Activity 8 min   Therapeutic Exercise 13 min    Delia To OT  6/6/2018

## 2018-06-06 NOTE — ASSESSMENT & PLAN NOTE
- Resolved  - could be from current abx  - C- diff negative. CMV pcr neg.   - Discussed with ID (Dr. Eric)

## 2018-06-06 NOTE — SUBJECTIVE & OBJECTIVE
Interval History 6/6/2018:  Patient is seen for follow-up rehab evaluation and recommendations: Participating with therapy.     HPI, Past Medical, Family, and Social History remains the same as documented in the initial encounter.    Scheduled Medications:    sodium chloride 0.9%   Intravenous Once    sodium chloride 0.9%   Intravenous Once    aspirin  81 mg Oral Daily    cetirizine  5 mg Oral Daily    enoxaparin  30 mg Subcutaneous Daily    epoetin jose miguel (PROCRIT) injection  10,000 Units Intravenous Every Mon, Wed, Fri    escitalopram oxalate  20 mg Oral Daily    gabapentin  100 mg Oral TID    insulin detemir U-100  6 Units Subcutaneous BID    levothyroxine  137 mcg Oral Before breakfast    midodrine  10 mg Oral TID WM    mycophenolate  250 mg Oral BID    pantoprazole  40 mg Oral Daily    pravastatin  40 mg Oral Daily    predniSONE  2.5 mg Oral Daily    QUEtiapine  50 mg Oral QHS    tacrolimus  5 mg Oral BID       Diagnostic Results: Labs: Reviewed    PRN Medications: sodium chloride, sodium chloride 0.9%, sodium chloride 0.9%, acetaminophen, ALPRAZolam, benzonatate, dextrose 50%, dextrose 50%, fentaNYL, glucagon (human recombinant), glucose, glucose, insulin aspart U-100, levalbuterol, lidocaine HCL 2%, midodrine, ondansetron, oxyCODONE, promethazine (PHENERGAN) IVPB    Review of Systems   Constitutional: Positive for fatigue. Negative for chills and fever.   HENT: Negative for trouble swallowing and voice change.    Eyes: Negative for photophobia and visual disturbance.   Respiratory: Negative for cough, shortness of breath and wheezing.    Cardiovascular: Negative for chest pain and palpitations.   Gastrointestinal: Negative for abdominal distention and vomiting.   Genitourinary: Negative for difficulty urinating and flank pain.   Musculoskeletal: Positive for gait problem. Negative for arthralgias and myalgias.   Skin: Positive for wound. Negative for color change and rash.   Neurological:  Positive for weakness. Negative for speech difficulty, numbness and headaches.   Psychiatric/Behavioral: Negative for agitation and confusion.     Objective:     Vital Signs (Most Recent):  Temp: 97.9 °F (36.6 °C) (06/06/18 1140)  Pulse: 100 (06/06/18 1200)  Resp: 18 (06/06/18 1140)  BP: (!) 91/56 (06/06/18 1140)  SpO2: 99 % (06/06/18 1140)    Vital Signs (24h Range):  Temp:  [97.8 °F (36.6 °C)-99.5 °F (37.5 °C)] 97.9 °F (36.6 °C)  Pulse:  [] 100  Resp:  [16-18] 18  SpO2:  [92 %-99 %] 99 %  BP: ()/(55-62) 91/56     Physical Exam   Constitutional: He is oriented to person, place, and time. He appears well-developed and well-nourished.   HENT:   Head: Normocephalic and atraumatic.   Eyes: EOM are normal. Pupils are equal, round, and reactive to light.   Neck: Neck supple.   Cardiovascular: Normal rate and regular rhythm.    Pulmonary/Chest: Effort normal. No respiratory distress.   Abdominal: Soft. There is no tenderness.   Musculoskeletal: Normal range of motion. He exhibits no deformity.   Neurological: He is alert and oriented to person, place, and time. No sensory deficit.   RUE: 4/5.  LUE: 4/5.  RLE: 4/5.  LLE: 4/5.     Skin: Skin is warm and dry.   Psychiatric: He has a normal mood and affect. His behavior is normal.     NEUROLOGICAL EXAMINATION:     MENTAL STATUS   Oriented to person, place, and time.     CRANIAL NERVES     CN III, IV, VI   Pupils are equal, round, and reactive to light.  Extraocular motions are normal.

## 2018-06-07 NOTE — NURSING
Rounds Report: Attended interdisciplinary rounds this morning with the transplant team including SW, physicians, fellows,  mid-level providers, and transplant coordinators.  Discussed plan of care, including DC to rehab next week.  Post heart transplant coordinator visited with pt in TSU. Pt in good spirits in bed, sat in chair for 1 hour today. Encouraged pt to sit longer in chair.

## 2018-06-07 NOTE — PROGRESS NOTES
Patient requesting to get back in bed. Informed the patient that he has only been in the chair for 30 minutes and that the PT said that he would be back to do more exercises and assist him back to bed after a little while.

## 2018-06-07 NOTE — PROGRESS NOTES
Patient requesting to get back to bed. PT at the bedside, assisted the patient back to bed. Call light and personal items are within reach.

## 2018-06-07 NOTE — PLAN OF CARE
Problem: Physical Therapy Goal  Goal: Physical Therapy Goal  Goals to be met by: 18     Patient will increase functional independence with mobility by performin. Supine to sit with Stand by Assistance. -not met  2. Sit to supine with Stand by Assistance. -not met  3. Rolling to Left and Right with [place]. -not met  4. Sit to stand transfer with Minimum Assistance. -not met  5. Gait  x 20 feet with Minimum Assistance. -not met  6. Ascend/descend 4 stairs with bilateral Handrails CGA. -not met  7. Lower extremity exercise program x20 reps per handout, with independence. -not met      Outcome: Ongoing (interventions implemented as appropriate)  Progressing towards goals    Corey Hernández DPT  2018

## 2018-06-07 NOTE — ASSESSMENT & PLAN NOTE
- secondary to prolonged illness  - PT/OT in patient and d/c back to rehab once cleared  - Working on getting to a rehab closer to home in [place].

## 2018-06-07 NOTE — PROGRESS NOTES
Discharge planning    Pt has been accepted and insurance authorization received for [facility] in [place].     Awaiting insurance clearance for [facility] in [place]. Informed by [facility] that pt is not in network and pt could be responsible for 20%.     Rehab states that [facility] should bill the rehab, not pt's insurance while pt at rehab. [facility] states that they have to bill pt's insurance.     Also state that there has to be an in network dialysis center willing to accept pt, and in network, when pt is discharged from rehab.     Phoned pt in room regarding situation and pt gives permission to fax demographics to [facility] ph [phone], fax [phone] for possible placement at [facility] in [place] (on [facility]) following rehab discharge.     Pt states [facility] at [facility].     Later informed by Jolene, [phone], at [facility] in [place] that there are no dialysis units in network but SW still waiting to hear back from [facility].     In frequent contact with Maria Ines at [facility] throughout day. Maria Ines asked for contact at [facility] for her supervisor to discuss billing question. GUS provided same.    Per [facility] [place], Medical Director on vacation and will be back on Wednesday. The back-up MD does not want to make decision due to complications of case.      SW communicating updates to team.

## 2018-06-07 NOTE — SUBJECTIVE & OBJECTIVE
Interval History: No acute overnight event.     Scheduled Meds:   sodium chloride 0.9%   Intravenous Once    aspirin  81 mg Oral Daily    cetirizine  5 mg Oral Daily    enoxaparin  30 mg Subcutaneous Daily    epoetin jose miguel (PROCRIT) injection  10,000 Units Intravenous Every Mon, Wed, Fri    escitalopram oxalate  20 mg Oral Daily    gabapentin  100 mg Oral TID    insulin detemir U-100  6 Units Subcutaneous BID    levothyroxine  137 mcg Oral Before breakfast    midodrine  10 mg Oral TID WM    mycophenolate  250 mg Oral BID    pantoprazole  40 mg Oral Daily    pravastatin  40 mg Oral Daily    predniSONE  2.5 mg Oral Daily    QUEtiapine  50 mg Oral QHS    tacrolimus  5 mg Oral BID     PRN Meds:sodium chloride, sodium chloride 0.9%, sodium chloride 0.9%, acetaminophen, ALPRAZolam, benzonatate, dextrose 50%, dextrose 50%, fentaNYL, glucagon (human recombinant), glucose, glucose, heparin (porcine), insulin aspart U-100, levalbuterol, lidocaine HCL 2%, midodrine, ondansetron, oxyCODONE, promethazine (PHENERGAN) IVPB    Review of patient's allergies indicates:   Allergen Reactions    No known drug allergies      Objective:     Vital Signs (Most Recent):  Temp: 97.6 °F (36.4 °C) (06/07/18 0800)  Pulse: 103 (06/07/18 0800)  Resp: 17 (06/07/18 0800)  BP: (!) 101/58 (06/07/18 0800)  SpO2: 97 % (06/07/18 0800) Vital Signs (24h Range):  Temp:  [96.6 °F (35.9 °C)-100 °F (37.8 °C)] 97.6 °F (36.4 °C)  Pulse:  [] 103  Resp:  [15-19] 17  SpO2:  [93 %-100 %] 97 %  BP: ()/(56-87) 101/58     Patient Vitals for the past 72 hrs (Last 3 readings):   Weight   06/05/18 0525 77 kg (169 lb 12.1 oz)     Body mass index is 26.59 kg/m².      Intake/Output Summary (Last 24 hours) at 06/07/18 0954  Last data filed at 06/07/18 0559   Gross per 24 hour   Intake             1410 ml   Output             2650 ml   Net            -1240 ml        Telemetry: no event noted    Physical Exam   Constitutional: He is oriented to  person, place, and time. He appears well-developed and well-nourished.   HENT:   Head: Normocephalic.   Eyes: Pupils are equal, round, and reactive to light.   Neck: Normal range of motion. Neck supple.   Cardiovascular: Normal rate and regular rhythm.    Pulmonary/Chest: Effort normal.   Decreased BLL   Abdominal: Soft. Bowel sounds are normal.   Musculoskeletal: Normal range of motion.   Neurological: He is alert and oriented to person, place, and time.   Skin: Skin is warm and dry.   Psychiatric: He has a normal mood and affect. His behavior is normal.   Nursing note and vitals reviewed.     Significant Labs:  CBC:    Recent Labs  Lab 06/05/18  0500 06/06/18  0537 06/07/18  0537   WBC 3.66* 4.45 3.98   RBC 2.74* 2.75* 2.88*   HGB 8.2* 8.2* 8.7*   HCT 27.0* 27.3* 28.7*    225 238   MCV 99* 99* 100*   MCH 29.9 29.8 30.2   MCHC 30.4* 30.0* 30.3*     BNP:  No results for input(s): BNP in the last 168 hours.    Invalid input(s): BNPTRIAGELBLO  CMP:    Recent Labs  Lab 06/05/18  0500 06/06/18  0537 06/07/18  0537   * 164* 165*   CALCIUM 8.3* 8.1* 8.5*   ALBUMIN 2.0* 1.9* 2.3*   PROT 5.3* 5.1* 5.3*    136 137   K 3.4* 3.3* 4.0   CO2 25 22* 23    102 105   BUN 10 14 8   CREATININE 1.9* 3.0* 2.0*   ALKPHOS 117 109 110   ALT 7* 7* 6*   AST 15 12 13   BILITOT 0.4 0.3 0.3      Coagulation:   No results for input(s): PT, INR, APTT in the last 168 hours.  LDH:  No results for input(s): LDH in the last 72 hours.  Microbiology:  Microbiology Results (last 7 days)     Procedure Component Value Units Date/Time    Fungus culture [[id number]] Collected:  06/01/18 1157    Order Status:  Completed Specimen:  Body Fluid from Lung, Lingula Updated:  06/06/18 1001     Fungus (Mycology) Culture Culture in progress    Narrative:       Bronchial Wash    AFB Culture & Smear [[id number]] Collected:  06/01/18 1157    Order Status:  Completed Specimen:  Body Fluid from Lung, Lingula Updated:  06/04/18 1455     AFB  Culture & Smear Culture in progress     AFB CULTURE STAIN No acid fast bacilli seen.    Culture, Respiratory [[id number]] Collected:  06/01/18 1156    Order Status:  Completed Specimen:  Respiratory from BAL, LLL Updated:  06/04/18 0917     Respiratory Culture Normal respiratory hank     Gram Stain (Respiratory) <10 epithelial cells per low power field.     Gram Stain (Respiratory) Rare WBC's     Gram Stain (Respiratory) No organisms seen    Blood culture #2 **CANNOT BE ORDERED STAT** [[id number]] Collected:  05/29/18 0319    Order Status:  Completed Specimen:  Blood from Peripheral, Hand, Left Updated:  06/03/18 0612     Blood Culture, Routine No growth after 5 days.    Blood culture #1 **CANNOT BE ORDERED STAT** [[id number]] Collected:  05/29/18 0303    Order Status:  Completed Specimen:  Blood from Peripheral, Wrist, Right Updated:  06/03/18 0612     Blood Culture, Routine No growth after 5 days.    C Diff Toxin by PCR [[id number]] Collected:  06/01/18 1514    Order Status:  Completed Updated:  06/02/18 0404     C. diff PCR Negative    Clostridium difficile EIA [[id number]]  (Abnormal) Collected:  06/01/18 1514    Order Status:  Completed Specimen:  Stool from Stool Updated:  06/02/18 0319     C. diff Antigen Positive (A)     C difficile Toxins A+B, EIA Negative     Comment: Testing not recommended for children <24 months old.       KOH prep [[id number]] Collected:  06/01/18 1157    Order Status:  Completed Specimen:  Body Fluid from Lung, Lingula Updated:  06/01/18 1731     KOH Prep No yeast or fungal elements seen    Narrative:       Bronchial Wash    Respiratory Viral Panel by PCR [facility]; Sputum (BAL) [[id number]] Collected:  06/01/18 1157    Order Status:  Sent Specimen:  Nasopharyngeal Updated:  06/01/18 1458    Culture, Respiratory with Gram Stain [[id number]] Collected:  06/01/18 1156    Order Status:  Canceled Specimen:  Respiratory from BAL, LLL Updated:  06/01/18 1457    Gram stain [[id number]] Collected:   06/01/18 1157    Order Status:  Canceled Specimen:  Body Fluid from Lung, Lingula Updated:  06/01/18 1456    Cryptococcal antigen [[id number]] Collected:  05/30/18 1222    Order Status:  Completed Specimen:  Blood from Blood Updated:  05/31/18 1228     Cryptococcal Ag, Blood Negative    Respiratory Viral Panel by PCR [facility]; Nasal Swab [[id number]] Collected:  05/29/18 2333    Order Status:  Completed Specimen:  Respiratory Updated:  05/31/18 1042     Respiratory Virus Panel, source Nasal Swab     RVP - Adenovirus Not Detected     Comment: Detects Serotypes B and E. Detection of Serotype C may   be limited. If Adenovirus infection is suspected and a   Not Detected result is returned the sample should be   re-tested for Adenovirus using an independent method  (e.g. [employer] Adenovirus Quantitative Real-Time  PCR test.          Enterovirus Not Detected     Comment: Cross-reactivity has been observed between certain Rhinovirus  strains and the Enterovirus assay.          Human Bocavirus Not Detected     Human Coronavirus Not Detected     Comment: The Human Coronavirus assay detects Human coronavirus types  229E, OC43,NL63 and HKU1.          RVP - Human Metapneumovirus (hMPV) Not Detected     RVP - Influenza A Not Detected     Influenza A - [id number] Not Detected     RVP - Influenza B Not Detected     Parainfluenza Not Detected     Respiratory Syncytial VirusVirus (RSV) A Not Detected     Comment: The Respiratory Syncytial Viral assay detects types A and B,  however it does not distinguish between the two.          RVP - Rhinovirus Not Detected     Comment: Cross-Reactivity has been observed between certain   Rhinovirus strains and the Enterovirus assay.  Target Enriched Mulitplex Polymerase Chain Reaction (TEM-PCR)  allows for the detection of multiple pathogens out of a single  reaction.  This test was developed and its performance   characteristics determined by [employer].  It has not   been cleared or  approved by the U.[place].Food and Drug Administration.  Results should be used in conjunction with clinical findings,   and should not form the sole basis for a diagnosis or treatment  decision.  TEM-PCR is a licensed technology of [employer].         Narrative:       Receiving Lab:->[facility]        I have reviewed all pertinent labs within the past 24 hours.    Estimated Creatinine Clearance: 44.1 mL/min (A) (based on SCr of 2 mg/dL (H)).

## 2018-06-07 NOTE — PT/OT/SLP PROGRESS
"Physical Therapy Treatment    Patient Name:  Lv Crocker   MRN:  [id number]    Recommendations:     Discharge Recommendations:  rehabilitation facility   Discharge Equipment Recommendations:  (TBD)   Barriers to discharge: Inaccessible home and Decreased caregiver support    Assessment:     Lv Crocker is a 42 y.o. male admitted with a medical diagnosis of Fever.  He presents with the following impairments/functional limitations:  weakness, impaired functional mobilty, gait instability, impaired endurance, impaired balance, impaired self care skills, decreased lower extremity function, decreased upper extremity function, pain, impaired skin, orthopedic precautions, impaired cardiopulmonary response to activity.  Pt tolerated treatment session c mod c/o BLE weakness.  Pt requiring mod A for functional mobility on this date.  Pt requested to return to bed following 30mins in chair but writing therapist unable to return at that moment d/t in treatment c another pt.  Upon entry of room to return pt to bed, pt c c/o frustration stating "I'm not kidding around when I say I want to get back into bed"  Pt able to tolerate sitting UIC x1hr c mod c/o sacral pain.  Pt able to take few steps on this date using RW and mod A - demo 1x R knee buckling.  Pt able to maintain static standing ~30sec at a time c min A.  Pt encourage to sit EOB c assistance as needed and to performing BLE exercises throughout day to maintain/improve strength.  Pt would benefit from continued skilled acute PT 5x/wk to improve functional mobility.       Rehab Prognosis:  good; patient would benefit from acute skilled PT services to address these deficits and reach maximum level of function.      Recent Surgery: * No surgery found *      Plan:     During this hospitalization, patient to be seen 5 x/week to address the above listed problems via gait training, therapeutic activities, therapeutic exercises, neuromuscular " "re-education  · Plan of Care Expires:  06/30/18   Plan of Care Reviewed with: patient    Subjective     Communicated with RN prior to session.  Patient found HOB elevated resting and brother present upon PT entry to room, agreeable to treatment.      PM - pt found UIC as he requested to return to bed     Chief Complaint: BLE weakness; fatigue; sacral pain  Patient comments/goals: "I'm not kidding around when I say I want to get back into bed. When I say I want to get back, I want to get back."  Pain/Comfort:  · Pain Rating 1: 0/10    Patients cultural, spiritual, Buddhism conflicts given the current situation: none    Objective:     Patient found with: pressure relief boots, telemetry     General Precautions: Standard, fall   Orthopedic Precautions:RLE weight bearing as tolerated, LLE weight bearing as tolerated   Braces:  (darco boots)     Functional Mobility:  · Bed Mobility:     · Rolling Right: contact guard assistance  · Scooting: contact guard assistance  · Supine to Sit: minimum assistance for trunk support  · Sit to Supine: minimum assistance for BLE support  · Transfers:     · Sit to Stand:  minimum assistance with no AD  · Performed 2x from bed in AM; 3x from bed in PM  · Mod assistance c no AD  · Performed 5x from bedside chair in AM; 1x from bedside chair in PM  · Bed to Chair: moderate assistance with  rolling walker  using  Step Transfer  · Gait: 8 Steps c RW mod A; 8 Steps c no AD mod A  · Decreased gait speed, unsteadiness, 1x R knee buckling, decreased toe-floor clearance B, difficulty c weight shifting  · Balance: static standing (min A); dynamic standing (mod A)      AM-PAC 6 CLICK MOBILITY  Turning over in bed (including adjusting bedclothes, sheets and blankets)?: 3  Sitting down on and standing up from a chair with arms (e.g., wheelchair, bedside commode, etc.): 2  Moving from lying on back to sitting on the side of the bed?: 2  Moving to and from a bed to a chair (including a wheelchair)?: " 2  Need to walk in hospital room?: 2  Climbing 3-5 steps with a railing?: 1  Total Score: 12       Therapeutic Activities and Exercises:  Educated on progress; safety c mobility; benefits of OOB activities; importance of performing BLE exercises daily as pt unable to receive 3hrs of therapy in hospital; sitting EOB throughout day c assistance as needed  Mobility as stated above  Static standing 2x43ywpi; 1x1min for kamari-hygiene d/t soiling self  Weight shifting in standing 2x1min  Assisted in changing bedding d/t soiling  Repositioned in bed for comfort    PM ([id number]) - return to assist pt back into bed as well as provide additional therapy as pt requested more since he does not get enough here compared to rehab    Patient left HOB elevated with all lines intact, call button in reach and RN notified..    GOALS:    Physical Therapy Goals        Problem: Physical Therapy Goal    Goal Priority Disciplines Outcome Goal Variances Interventions   Physical Therapy Goal     PT/OT, PT Ongoing (interventions implemented as appropriate)     Description:  Goals to be met by: 18     Patient will increase functional independence with mobility by performin. Supine to sit with Stand by Assistance. -not met  2. Sit to supine with Stand by Assistance. -not met  3. Rolling to Left and Right with Ramsay. -not met  4. Sit to stand transfer with Minimum Assistance. -not met  5. Gait  x 20 feet with Minimum Assistance. -not met  6. Ascend/descend 4 stairs with bilateral Handrails CGA. -not met  7. Lower extremity exercise program x20 reps per handout, with independence. -not met                       Time Tracking:     PT Received On: 18  PT Start Time: 1036     PT Stop Time: 1107  PT Total Time (min): 31 min + 11 min ([id number])    Billable Minutes: Gait Training 13 min and Therapeutic Activity 29 min    Treatment Type: Treatment  PT/PTA: PT     PTA Visit Number: 0     Corey Hernández, PT  2018

## 2018-06-07 NOTE — PLAN OF CARE
Problem: Patient Care Overview  Goal: Plan of Care Review  Outcome: Ongoing (interventions implemented as appropriate)    Pt AAOx4 with brother at the bedside. Pt also had wife of another heart transplant pt on the floor visiting with him for some time tonight. VSS. Pt on telemetry running ST with HR low 100's to low 1teens. Satting 92% on RA. Satting 99% wearing CPAP HS. BP 90's-low 100's/50's-60's. TMAX 100.0F. No c/o pain. Pt c/o indigestion before bedtime. Pt given 1x dose of TUMS 1,000mg. Accuchecks being monitored AC/HS with bedtime sugar of  252 requiring  3  units sliding scale insulin coverage. Pt also given 6 units of Levemir HS. Pt has Necrosis to distal portions of Left great toe and Right great, 2nd, and 3rd toe. Betadine painted on wounds per WC order. Pt wearing pressure offloading boots while in bed and also has foam dressings to bilateral heels for extra pressure protection. Pt also has St 2 pressure ulcers to bilateral upper buttocks that are healing.  Barrier cream applied. Pt had 1 incontinent episode of stool. Pt cleaned up and barrier cream reapplied. Frequent turning with pillow provided. Turning with 2 person assist. Pt has remained free from falls or injury thus far. Bed is in low/ locked position, side rails are up x 3, call light is in reach. Will continue to monitor.

## 2018-06-07 NOTE — ASSESSMENT & PLAN NOTE
- Resolved.  -CT scan on admission + for congestion but equivocal for pneumonia. Present of moderate R- pleural effusion.  - Bronch cultures NGTD.   - Blood Cultures and resp panel continues to be negative including RSV.  - CMV weakly positive. Will repeat today per ID recs.   - Stopped cefepime and vanco per ID as he has completed 7 day course.

## 2018-06-07 NOTE — PLAN OF CARE
Problem: Patient Care Overview  Goal: Plan of Care Review  Outcome: Ongoing (interventions implemented as appropriate)  Patient is AAOx3, requires 2 people for assistance. Patient denies any pain or nausea. PT and OT are working with the patient. Patient sat up in the chair for 1 hour today. Heel boots in place while in bed. Patient the patient's toes with betadine daily. Patient remains NSR on telemetry. Assisting the patient with turning every 2 hours. Monitoring the patient's blood sugar AC&HS. Patient refused to have his PIV changed at this time. Will try again later. Reminded the patient to call for assistance. Call light and personal items are within reach.

## 2018-06-07 NOTE — PROGRESS NOTES
[facility]  Heart Transplant  Progress Note    Patient Name: Lv Crocker  MRN: [id number]  Admission Date: 5/29/2018  Hospital Length of Stay: 9 days  Attending Physician: Jose A Lloyd MD  Primary Care Provider: Philippe Mohr MD  Principal Problem:Fever    Subjective:     Interval History: No acute overnight event.     Scheduled Meds:   sodium chloride 0.9%   Intravenous Once    aspirin  81 mg Oral Daily    cetirizine  5 mg Oral Daily    enoxaparin  30 mg Subcutaneous Daily    epoetin jose miguel (PROCRIT) injection  10,000 Units Intravenous Every Mon, Wed, Fri    escitalopram oxalate  20 mg Oral Daily    gabapentin  100 mg Oral TID    insulin detemir U-100  6 Units Subcutaneous BID    levothyroxine  137 mcg Oral Before breakfast    midodrine  10 mg Oral TID WM    mycophenolate  250 mg Oral BID    pantoprazole  40 mg Oral Daily    pravastatin  40 mg Oral Daily    predniSONE  2.5 mg Oral Daily    QUEtiapine  50 mg Oral QHS    tacrolimus  5 mg Oral BID     PRN Meds:sodium chloride, sodium chloride 0.9%, sodium chloride 0.9%, acetaminophen, ALPRAZolam, benzonatate, dextrose 50%, dextrose 50%, fentaNYL, glucagon (human recombinant), glucose, glucose, heparin (porcine), insulin aspart U-100, levalbuterol, lidocaine HCL 2%, midodrine, ondansetron, oxyCODONE, promethazine (PHENERGAN) IVPB    Review of patient's allergies indicates:   Allergen Reactions    No known drug allergies      Objective:     Vital Signs (Most Recent):  Temp: 97.6 °F (36.4 °C) (06/07/18 0800)  Pulse: 103 (06/07/18 0800)  Resp: 17 (06/07/18 0800)  BP: (!) 101/58 (06/07/18 0800)  SpO2: 97 % (06/07/18 0800) Vital Signs (24h Range):  Temp:  [96.6 °F (35.9 °C)-100 °F (37.8 °C)] 97.6 °F (36.4 °C)  Pulse:  [] 103  Resp:  [15-19] 17  SpO2:  [93 %-100 %] 97 %  BP: ()/(56-87) 101/58     Patient Vitals for the past 72 hrs (Last 3 readings):   Weight   06/05/18 0525 77 kg (169 lb 12.1 oz)     Body mass  index is 26.59 kg/m².      Intake/Output Summary (Last 24 hours) at 06/07/18 0954  Last data filed at 06/07/18 0559   Gross per 24 hour   Intake             1410 ml   Output             2650 ml   Net            -1240 ml        Telemetry: no event noted    Physical Exam   Constitutional: He is oriented to person, place, and time. He appears well-developed and well-nourished.   HENT:   Head: Normocephalic.   Eyes: Pupils are equal, round, and reactive to light.   Neck: Normal range of motion. Neck supple.   Cardiovascular: Normal rate and regular rhythm.    Pulmonary/Chest: Effort normal.   Decreased BLL   Abdominal: Soft. Bowel sounds are normal.   Musculoskeletal: Normal range of motion.   Neurological: He is alert and oriented to person, place, and time.   Skin: Skin is warm and dry.   Psychiatric: He has a normal mood and affect. His behavior is normal.   Nursing note and vitals reviewed.     Significant Labs:  CBC:    Recent Labs  Lab 06/05/18  0500 06/06/18  0537 06/07/18  0537   WBC 3.66* 4.45 3.98   RBC 2.74* 2.75* 2.88*   HGB 8.2* 8.2* 8.7*   HCT 27.0* 27.3* 28.7*    225 238   MCV 99* 99* 100*   MCH 29.9 29.8 30.2   MCHC 30.4* 30.0* 30.3*     BNP:  No results for input(s): BNP in the last 168 hours.    Invalid input(s): BNPTRIAGELBLO  CMP:    Recent Labs  Lab 06/05/18  0500 06/06/18  0537 06/07/18  0537   * 164* 165*   CALCIUM 8.3* 8.1* 8.5*   ALBUMIN 2.0* 1.9* 2.3*   PROT 5.3* 5.1* 5.3*    136 137   K 3.4* 3.3* 4.0   CO2 25 22* 23    102 105   BUN 10 14 8   CREATININE 1.9* 3.0* 2.0*   ALKPHOS 117 109 110   ALT 7* 7* 6*   AST 15 12 13   BILITOT 0.4 0.3 0.3      Coagulation:   No results for input(s): PT, INR, APTT in the last 168 hours.  LDH:  No results for input(s): LDH in the last 72 hours.  Microbiology:  Microbiology Results (last 7 days)     Procedure Component Value Units Date/Time    Fungus culture [[id number]] Collected:  06/01/18 1157    Order Status:  Completed Specimen:   Body Fluid from Lung, Lingula Updated:  06/06/18 1001     Fungus (Mycology) Culture Culture in progress    Narrative:       Bronchial Wash    AFB Culture & Smear [[id number]] Collected:  06/01/18 1157    Order Status:  Completed Specimen:  Body Fluid from Lung, Lingula Updated:  06/04/18 1455     AFB Culture & Smear Culture in progress     AFB CULTURE STAIN No acid fast bacilli seen.    Culture, Respiratory [[id number]] Collected:  06/01/18 1156    Order Status:  Completed Specimen:  Respiratory from BAL, LLL Updated:  06/04/18 0917     Respiratory Culture Normal respiratory hank     Gram Stain (Respiratory) <10 epithelial cells per low power field.     Gram Stain (Respiratory) Rare WBC's     Gram Stain (Respiratory) No organisms seen    Blood culture #2 **CANNOT BE ORDERED STAT** [[id number]] Collected:  05/29/18 0319    Order Status:  Completed Specimen:  Blood from Peripheral, Hand, Left Updated:  06/03/18 0612     Blood Culture, Routine No growth after 5 days.    Blood culture #1 **CANNOT BE ORDERED STAT** [[id number]] Collected:  05/29/18 0303    Order Status:  Completed Specimen:  Blood from Peripheral, Wrist, Right Updated:  06/03/18 0612     Blood Culture, Routine No growth after 5 days.    C Diff Toxin by PCR [[id number]] Collected:  06/01/18 1514    Order Status:  Completed Updated:  06/02/18 0404     C. diff PCR Negative    Clostridium difficile EIA [[id number]]  (Abnormal) Collected:  06/01/18 1514    Order Status:  Completed Specimen:  Stool from Stool Updated:  06/02/18 0319     C. diff Antigen Positive (A)     C difficile Toxins A+B, EIA Negative     Comment: Testing not recommended for children <24 months old.       KOH prep [[id number]] Collected:  06/01/18 1157    Order Status:  Completed Specimen:  Body Fluid from Lung, Lingula Updated:  06/01/18 1731     KOH Prep No yeast or fungal elements seen    Narrative:       Bronchial Wash    Respiratory Viral Panel by PCR [facility]; Sputum (BAL) [[id number]]  Collected:  06/01/18 1157    Order Status:  Sent Specimen:  Nasopharyngeal Updated:  06/01/18 1458    Culture, Respiratory with Gram Stain [[id number]] Collected:  06/01/18 1156    Order Status:  Canceled Specimen:  Respiratory from BAL, LLL Updated:  06/01/18 1457    Gram stain [[id number]] Collected:  06/01/18 1157    Order Status:  Canceled Specimen:  Body Fluid from Lung, Lingula Updated:  06/01/18 1456    Cryptococcal antigen [[id number]] Collected:  05/30/18 1222    Order Status:  Completed Specimen:  Blood from Blood Updated:  05/31/18 1228     Cryptococcal Ag, Blood Negative    Respiratory Viral Panel by PCR [facility]; Nasal Swab [[id number]] Collected:  05/29/18 2333    Order Status:  Completed Specimen:  Respiratory Updated:  05/31/18 1042     Respiratory Virus Panel, source Nasal Swab     RVP - Adenovirus Not Detected     Comment: Detects Serotypes B and E. Detection of Serotype C may   be limited. If Adenovirus infection is suspected and a   Not Detected result is returned the sample should be   re-tested for Adenovirus using an independent method  (e.g. [employer] Adenovirus Quantitative Real-Time  PCR test.          Enterovirus Not Detected     Comment: Cross-reactivity has been observed between certain Rhinovirus  strains and the Enterovirus assay.          Human Bocavirus Not Detected     Human Coronavirus Not Detected     Comment: The Human Coronavirus assay detects Human coronavirus types  229E, OC43,NL63 and HKU1.          RVP - Human Metapneumovirus (hMPV) Not Detected     RVP - Influenza A Not Detected     Influenza A - A6S3-09 Not Detected     RVP - Influenza B Not Detected     Parainfluenza Not Detected     Respiratory Syncytial VirusVirus (RSV) A Not Detected     Comment: The Respiratory Syncytial Viral assay detects types A and B,  however it does not distinguish between the two.          RVP - Rhinovirus Not Detected     Comment: Cross-Reactivity has been observed between certain    Rhinovirus strains and the Enterovirus assay.  Target Enriched Mulitplex Polymerase Chain Reaction (TEM-PCR)  allows for the detection of multiple pathogens out of a single  reaction.  This test was developed and its performance   characteristics determined by [employer].  It has not   been cleared or approved by the U.S.Food and Drug Administration.  Results should be used in conjunction with clinical findings,   and should not form the sole basis for a diagnosis or treatment  decision.  TEM-PCR is a licensed technology of [employer].         Narrative:       Receiving Lab:->[facility]        I have reviewed all pertinent labs within the past 24 hours.    Estimated Creatinine Clearance: 44.1 mL/min (A) (based on SCr of 2 mg/dL (H)).      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 She denies him having any SOB, chest pain, diarrhea, nausea, vomiting. He doesnot make any urine since he has been non HD. Fever was noted to be as high as 101.6.She also notes that he has been coughing since being in ER but not before that.No SOB with PT at rehab.No dizziness or syncope. Wife reports he got an extra HD on Thursday because nephrology wanted to remove extra fluid.He has had a good appetite and tolerating PO and per wife has been working with PT and getting stronger. He has finished his abx course from last visit already.    * Fever    - Resolved.  -CT scan on admission + for congestion but equivocal for pneumonia. Present of moderate R- pleural effusion.  -  Bronch cultures NGTD.   - Blood Cultures and resp panel continues to be negative including RSV.  - CMV weakly positive. Will repeat today per ID recs.   - Stopped cefepime and vanco per ID as he has completed 7 day course.        Heart transplanted    -S/p OHT 2014  -Continue prednisone, cellcept and tacrolimus  -continue tacro 5/5 BID. Goal is 5-10  -Plan to adjust today as needed        Physical debility    - secondary to prolonged illness  - PT/OT in patient and d/c back to rehab once cleared  - Working on getting to a rehab closer to home in [place].          Diarrhea of presumed infectious origin    - Resolved  - could be from current abx  - C- diff negative. CMV pcr neg.         ESRD (end stage renal disease)    - on HD M, W, F.  - continue HD as scheduled. Appreciate nephrology assistance        Gangrene    - Dry gangrene developed previous admission with pressor use  - Will continue with betadine rx as current.   - Podiatry on board  - Foot XR and dopplers shows no changes concerning for infection          Pleural effusion    -Persistent right sided pleural effusion  -Ct chest show moderate amount.   -S/P bronch and lavage (6/1).          Type 1 diabetes mellitus with stage 3 chronic kidney disease    Insulin sliding scale  -continue detemir  - endocrine consult        Pulmonary infiltrates on CXR    - As above.        Depression    - continue current at home depression regimen.          Hypothyroidism due to Hashimoto's thyroiditis    - continue synthroid at home dose  - TSH 12.4, T4 4.4   - subclinical hypothyroidism.             Tim Mao NP  Heart Transplant  [facility]

## 2018-06-08 NOTE — PROGRESS NOTES
[facility] H[facility]IAL[facility]IS NOTE     Patient currently on hemodialysis for removal of uremic toxins .     Patient seen and evaluated on hemodialysis, tolerating treatment, see HD flowsheet for vitals and assessments.      No Hypotension, chest pain, shortness of breath, cramping, nausea or vomiting.     Aim for 2 L net UF as tolerated.   Continue epo  TN assisting with Rehab Placement    Amanda Cuellar NP  Nephrology

## 2018-06-08 NOTE — PROGRESS NOTES
Pt refusing venipuncture to draw AM labs. States he wants drawn from HD catheter and that he has had them drawn from there before since he has been here. MD notified. MD stated would prefer to have labs drawn by venipuncture before HD but if pt refuses can have labs drawn through HD cath in HD.

## 2018-06-08 NOTE — PROGRESS NOTES
[facility]  Heart Transplant  Progress Note    Patient Name: Lv Crocker  MRN: [id number]  Admission Date: 5/29/2018  Hospital Length of Stay: 10 days  Attending Physician: Jose A Lloyd MD  Primary Care Provider: Philippe Mohr MD  Principal Problem:Fever    Subjective:     Interval History: no issues overnight     Continuous Infusions:  Scheduled Meds:   sodium chloride 0.9%   Intravenous Once    sodium chloride 0.9%   Intravenous Once    aspirin  81 mg Oral Daily    cetirizine  5 mg Oral Daily    epoetin jose miguel (PROCRIT) injection  10,000 Units Intravenous Every Mon, Wed, Fri    escitalopram oxalate  20 mg Oral Daily    gabapentin  100 mg Oral TID    heparin (porcine)  5,000 Units Subcutaneous Q12H    insulin detemir U-100  6 Units Subcutaneous BID    levothyroxine  137 mcg Oral Before breakfast    midodrine  10 mg Oral TID WM    mycophenolate  250 mg Oral BID    pantoprazole  40 mg Oral Daily    pravastatin  40 mg Oral Daily    predniSONE  2.5 mg Oral Daily    QUEtiapine  50 mg Oral QHS    tacrolimus  5 mg Oral BID     PRN Meds:sodium chloride 0.9%, sodium chloride 0.9%, sodium chloride 0.9%, acetaminophen, ALPRAZolam, dextrose 50%, dextrose 50%, glucagon (human recombinant), glucose, glucose, heparin (porcine), insulin aspart U-100, midodrine, ondansetron, oxyCODONE, promethazine (PHENERGAN) IVPB    Review of patient's allergies indicates:   Allergen Reactions    No known drug allergies      Objective:     Vital Signs (Most Recent):  Temp: 97.5 °F (36.4 °C) (06/08/18 1146)  Pulse: 100 (06/08/18 1146)  Resp: 17 (06/08/18 1146)  BP: (!) 95/59 (06/08/18 1146)  SpO2: 95 % (06/08/18 1146) Vital Signs (24h Range):  Temp:  [97.5 °F (36.4 °C)-98.7 °F (37.1 °C)] 97.5 °F (36.4 °C)  Pulse:  [] 100  Resp:  [16-20] 17  SpO2:  [95 %-98 %] 95 %  BP: ()/(59-73) 95/59     No data found.    Body mass index is 26.59 kg/m².      Intake/Output Summary (Last 24 hours) at  06/08/18 1151  Last data filed at 06/07/18 2357   Gross per 24 hour   Intake              980 ml   Output                0 ml   Net              980 ml       Hemodynamic Parameters:         Physical Exam   Constitutional: He is oriented to person, place, and time. He appears well-developed and well-nourished.   HENT:   Head: Normocephalic.   Eyes: Pupils are equal, round, and reactive to light.   Neck: Normal range of motion. Neck supple.   Cardiovascular: Normal rate and regular rhythm.    Pulmonary/Chest: Effort normal.   Decreased BLL   Abdominal: Soft. Bowel sounds are normal.   Musculoskeletal: Normal range of motion.   Neurological: He is alert and oriented to person, place, and time.   Skin: Skin is warm and dry.   Psychiatric: He has a normal mood and affect. His behavior is normal.   Nursing note and vitals reviewed.      Significant Labs:  CBC:    Recent Labs  Lab 06/05/18  0500 06/06/18  0537 06/07/18  0537   WBC 3.66* 4.45 3.98   RBC 2.74* 2.75* 2.88*   HGB 8.2* 8.2* 8.7*   HCT 27.0* 27.3* 28.7*    225 238   MCV 99* 99* 100*   MCH 29.9 29.8 30.2   MCHC 30.4* 30.0* 30.3*     BNP:  No results for input(s): BNP in the last 168 hours.    Invalid input(s): BNPTRIAGELBLO  CMP:    Recent Labs  Lab 06/05/18  0500 06/06/18  0537 06/07/18  0537   * 164* 165*   CALCIUM 8.3* 8.1* 8.5*   ALBUMIN 2.0* 1.9* 2.3*   PROT 5.3* 5.1* 5.3*    136 137   K 3.4* 3.3* 4.0   CO2 25 22* 23    102 105   BUN 10 14 8   CREATININE 1.9* 3.0* 2.0*   ALKPHOS 117 109 110   ALT 7* 7* 6*   AST 15 12 13   BILITOT 0.4 0.3 0.3      Coagulation:   No results for input(s): PT, INR, APTT in the last 168 hours.  LDH:  No results for input(s): LDH in the last 72 hours.  Microbiology:  Microbiology Results (last 7 days)     Procedure Component Value Units Date/Time    Fungus culture [[id number]] Collected:  06/01/18 1157    Order Status:  Completed Specimen:  Body Fluid from Lung, Lingula Updated:  06/06/18 1001     Fungus  (Mycology) Culture Culture in progress    Narrative:       Bronchial Wash    AFB Culture & Smear [[id number]] Collected:  06/01/18 1157    Order Status:  Completed Specimen:  Body Fluid from Lung, Lingula Updated:  06/04/18 1455     AFB Culture & Smear Culture in progress     AFB CULTURE STAIN No acid fast bacilli seen.    Culture, Respiratory [[id number]] Collected:  06/01/18 1156    Order Status:  Completed Specimen:  Respiratory from BAL, LLL Updated:  06/04/18 0917     Respiratory Culture Normal respiratory hank     Gram Stain (Respiratory) <10 epithelial cells per low power field.     Gram Stain (Respiratory) Rare WBC's     Gram Stain (Respiratory) No organisms seen    Blood culture #2 **CANNOT BE ORDERED STAT** [[id number]] Collected:  05/29/18 0319    Order Status:  Completed Specimen:  Blood from Peripheral, Hand, Left Updated:  06/03/18 0612     Blood Culture, Routine No growth after 5 days.    Blood culture #1 **CANNOT BE ORDERED STAT** [[id number]] Collected:  05/29/18 0303    Order Status:  Completed Specimen:  Blood from Peripheral, Wrist, Right Updated:  06/03/18 0612     Blood Culture, Routine No growth after 5 days.    C Diff Toxin by PCR [[id number]] Collected:  06/01/18 1514    Order Status:  Completed Updated:  06/02/18 0404     C. diff PCR Negative    Clostridium difficile EIA [[id number]]  (Abnormal) Collected:  06/01/18 1514    Order Status:  Completed Specimen:  Stool from Stool Updated:  06/02/18 0319     C. diff Antigen Positive (A)     C difficile Toxins A+B, EIA Negative     Comment: Testing not recommended for children <24 months old.       KOH prep [[id number]] Collected:  06/01/18 1157    Order Status:  Completed Specimen:  Body Fluid from Lung, Lingula Updated:  06/01/18 1731     KOH Prep No yeast or fungal elements seen    Narrative:       Bronchial Wash    Respiratory Viral Panel by PCR [facility]; Sputum (BAL) [[id number]] Collected:  06/01/18 1157    Order Status:  Sent Specimen:   Nasopharyngeal Updated:  06/01/18 1458    Culture, Respiratory with Gram Stain [[id number]] Collected:  06/01/18 1156    Order Status:  Canceled Specimen:  Respiratory from BAL, LLL Updated:  06/01/18 1457    Gram stain [[id number]] Collected:  06/01/18 1157    Order Status:  Canceled Specimen:  Body Fluid from Lung, Lingula Updated:  06/01/18 1456          I have reviewed all pertinent labs within the past 24 hours.    Estimated Creatinine Clearance: 44.1 mL/min (A) (based on SCr of 2 mg/dL (H)).    Diagnostic Results:  I have reviewed and interpreted all pertinent imaging results/findings within the past 24 hours.    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 She denies him having any SOB, chest pain, diarrhea, nausea, vomiting. He doesnot make any urine since he has been non HD. Fever was noted to be as high as 101.6.She also notes that he has been coughing since being in ER but not before that.No SOB with PT at rehab.No dizziness or syncope. Wife reports he got an extra HD on Thursday because nephrology wanted to remove extra fluid.He has had a good appetite and tolerating PO and per wife has been working with PT and getting stronger. He has finished his abx course from last visit already.    * Fever    - Resolved.  -CT scan on admission + for congestion but equivocal for pneumonia. Present of moderate R- pleural effusion.  - Bronch cultures NGTD.   - Blood Cultures and resp panel continues to be negative including RSV.  - CMV weakly positive. Will repeat today per ID  recs.   - Stopped cefepime and vanco per ID as he has completed 7 day course.        Diarrhea of presumed infectious origin    - Resolved  - could be from current abx  - C- diff negative. CMV pcr neg.         Pulmonary infiltrates on CXR    - As above.        Depression    - continue current at home depression regimen.          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   - Podiatry on board  - Foot XR and dopplers shows no changes concerning for infection          ESRD (end stage renal disease)    - on HD M, W, F.  - continue HD as scheduled. Appreciate nephrology assistance        Pleural effusion    -Persistent right sided pleural effusion  -Ct chest show moderate amount.   -S/P bronch and lavage (6/1).          Type 1 diabetes mellitus with stage 3 chronic kidney disease    Insulin sliding scale  -continue detemir  - endocrine consult        Hypothyroidism due to Hashimoto's thyroiditis    - continue synthroid at home dose  - TSH 12.4, T4 4.4   - subclinical hypothyroidism.         Heart transplanted    -S/p OHT 2014  -Continue prednisone, cellcept and tacrolimus  -continue tacro 5/5 BID. Goal is 5-10  -Plan to adjust today as needed        Physical debility    - secondary to prolonged illness  - PT/OT in patient and d/c back to rehab once cleared  - Working on getting to a rehab closer to home in [place].              JOSE DANIEL Zabala  Heart Transplant  [facility]

## 2018-06-08 NOTE — PT/OT/SLP PROGRESS
Physical Therapy      Patient Name:  Lv Crocker   MRN:  [id number]    Attempted to visit pt in AM - pt request therapy return at later time as pt soon to be transferred for dialysis.  Pt unwilling to participate c therapy prior to dialysis despite maximum encouragement.  Therapist unable to return later in day.   Will follow-up next scheduled date.    Corey Hernández, PT   6/8/2018

## 2018-06-08 NOTE — PLAN OF CARE
Problem: Patient Care Overview  Goal: Plan of Care Review  Outcome: Ongoing (interventions implemented as appropriate)    Pt AAOx4 with brother at the bedside.  VSS. Pt on telemetry running NSR to ST with  to low 100's to low 1teens. Satting 95-96% on RA. BP ow 100's-1teens/60's-70's. TMAX 98.7F. No c/o pain.  Accuchecks being monitored AC/HS with bedtime sugar of  138 requiring  no sliding scale insulin coverage. Pt also given 6 units of Levemir HS. Pt has Necrosis to distal portions of Left great toe and Right great, 2nd, and 3rd toe. Betadine painted on wounds per WC order. Pt wearing pressure offloading boots while in bed and also has foam dressings to bilateral heels for extra pressure protection. Pt also has St 2 pressure ulcers to bilateral upper buttocks that are healing.  Barrier cream applied. Pt had 1 incontinent episode of stool and also had incontinent episode of a little bit of urine. Frequent turning with pillow provided. Turning with 2 person assist. Pt has remained free from falls or injury thus far. Bed is in low/ locked position, side rails are up x 3, call light is in reach.R hand 20g IV due to be changed yesterday, but pt refused stating that he is a hard stick and that he might be getting transferred to rehab facility today. Pt approved for rehab placement in [place], [place]. Needs out pt dialysis placement. Plan for pt to go to HD this AM.  Will continue to monitor.

## 2018-06-08 NOTE — PT/OT/SLP PROGRESS
Occupational Therapy      Patient Name:  Lv Crocker   MRN:  [id number]    Patient not seen today secondary to Dialysis. Will follow-up as schedule allows.    Delia To OT  6/8/2018

## 2018-06-08 NOTE — NURSING
Rounds Report: Attended interdisciplinary rounds this morning with the transplant team including SW, physicians, fellows,  mid-level providers, and transplant coordinators.  Discussed plan of care, including pt's insurance does not cover in network dialysis. Waiting to hear from [facility] for approval. Will give until Wednesday for clearance before sending to [facility] rehab.

## 2018-06-08 NOTE — PROGRESS NOTES
" [facility]  Adult Nutrition  Progress Note    SUMMARY       Recommendations    Recommendation/Intervention:   -Continue cardiac diet. Pt is eating well at this time.   -Encourage intake of meals and snacks.   -If PO intake drops or per request, send Boost Glucose Control. RD following.     Goals: Consume/tolerate > 75% EEN/EPN  Nutrition Goal Status: new  Communication of RD Recs: reviewed with RN    Reason for Assessment    Reason for Assessment: length of stay  Diagnosis: cardiac disease  Relevant Medical History: OHTx 2014, anxiety, CHF, GERD, Hashimoto's, HLD, T1DM  General Information Comments: pt seen on LOS 10, on HD 3x/week, here for infection and fevers, eating 75% of meals consistently, weight loss and PEG/Trach at last admission and now removed; has been at rehab  Nutrition Discharge Planning: Adequate PO nutrition    Nutrition Risk Screen    Nutrition Risk Screen: no indicators present    Nutrition/Diet History    Patient Reported Diet/Restrictions/Preferences: low salt, diabetic diet  Do you have any cultural, spiritual, Islam conflicts, given your current situation?: none  Factors Affecting Nutritional Intake: None identified at this time    Anthropometrics    Temp: 97.9 °F (36.6 °C)  Height Method: Stated  Height: 5' 7.01" (170.2 cm)  Height (inches): 67.01 in  Weight Method: Bed Scale  Weight: 77 kg (169 lb 12.1 oz)  Weight (lb): 169.76 lb  Ideal Body Weight (IBW), Male: 148.06 lb  % Ideal Body Weight, Male (lb): 109.78 lb  BMI (Calculated): 25.5  BMI Grade: 25 - 29.9 - overweight       Lab/Procedures/Meds    Pertinent Labs Comments: Cr 2.0, glu 165, GFR 39.4  Pertinent Medications Reviewed: reviewed  Pertinent Medications Comments: epoetin, heparin, statin, prednisone, tacrolimus    Physical Findings/Assessment         Estimated/Assessed Needs    Weight Used For Calorie Calculations: 77 kg (169 lb 12.1 oz)  Energy Calorie Requirements (kcal): 2022 (1.25x PAL)  Energy Need " Method: Evelia Hayor  Protein Requirements: 77-93g   Weight Used For Protein Calculations: 77 kg (169 lb 12.1 oz)     Fluid Need Method: RDA Method  RDA Method (mL): 2022  CHO Requirement: 50% total kcal      Nutrition Prescription Ordered    Current Diet Order: Cardiac    Evaluation of Received Nutrient/Fluid Intake       % Intake of Estimated Energy Needs: 75 - 100 %  % Meal Intake: 75 - 100 %    Nutrition Risk          Assessment and Plan  No nutrition diagnosis at this time.        Monitor and Evaluation    Food and Nutrient Intake: energy intake, food and beverage intake  Food and Nutrient Adminstration: diet order  Knowledge/Beliefs/Attitudes: food and nutrition knowledge/skill  Physical Activity and Function: nutrition-related ADLs and IADLs  Anthropometric Measurements: weight, weight change, body mass index  Biochemical Data, Medical Tests and Procedures: electrolyte and renal panel, gastrointestinal profile, glucose/endocrine profile, inflammatory profile, lipid profile  Nutrition-Focused Physical Findings: overall appearance     Nutrition Follow-Up    RD Follow-up?: Yes

## 2018-06-08 NOTE — PROGRESS NOTES
Dialysis completed. Left IJ tunneled catheter flushed with normal saline, heparinized, capped and taped. Patient dialyzed for 3.5 hours with fluid removal of 1.5 liters. Tolerated well with stable vital signs.

## 2018-06-08 NOTE — PROGRESS NOTES
Patient received from floor with report from Kalli Joe. Maintenance dialysis began per orders via left IJ tunneled catheter.

## 2018-06-08 NOTE — SUBJECTIVE & OBJECTIVE
Interval History: no issues overnight     Continuous Infusions:  Scheduled Meds:   sodium chloride 0.9%   Intravenous Once    sodium chloride 0.9%   Intravenous Once    aspirin  81 mg Oral Daily    cetirizine  5 mg Oral Daily    epoetin jose miguel (PROCRIT) injection  10,000 Units Intravenous Every Mon, Wed, Fri    escitalopram oxalate  20 mg Oral Daily    gabapentin  100 mg Oral TID    heparin (porcine)  5,000 Units Subcutaneous Q12H    insulin detemir U-100  6 Units Subcutaneous BID    levothyroxine  137 mcg Oral Before breakfast    midodrine  10 mg Oral TID WM    mycophenolate  250 mg Oral BID    pantoprazole  40 mg Oral Daily    pravastatin  40 mg Oral Daily    predniSONE  2.5 mg Oral Daily    QUEtiapine  50 mg Oral QHS    tacrolimus  5 mg Oral BID     PRN Meds:sodium chloride 0.9%, sodium chloride 0.9%, sodium chloride 0.9%, acetaminophen, ALPRAZolam, dextrose 50%, dextrose 50%, glucagon (human recombinant), glucose, glucose, heparin (porcine), insulin aspart U-100, midodrine, ondansetron, oxyCODONE, promethazine (PHENERGAN) IVPB    Review of patient's allergies indicates:   Allergen Reactions    No known drug allergies      Objective:     Vital Signs (Most Recent):  Temp: 97.5 °F (36.4 °C) (06/08/18 1146)  Pulse: 100 (06/08/18 1146)  Resp: 17 (06/08/18 1146)  BP: (!) 95/59 (06/08/18 1146)  SpO2: 95 % (06/08/18 1146) Vital Signs (24h Range):  Temp:  [97.5 °F (36.4 °C)-98.7 °F (37.1 °C)] 97.5 °F (36.4 °C)  Pulse:  [] 100  Resp:  [16-20] 17  SpO2:  [95 %-98 %] 95 %  BP: ()/(59-73) 95/59     No data found.    Body mass index is 26.59 kg/m².      Intake/Output Summary (Last 24 hours) at 06/08/18 1151  Last data filed at 06/07/18 2357   Gross per 24 hour   Intake              980 ml   Output                0 ml   Net              980 ml       Hemodynamic Parameters:         Physical Exam   Constitutional: He is oriented to person, place, and time. He appears well-developed and well-nourished.    HENT:   Head: Normocephalic.   Eyes: Pupils are equal, round, and reactive to light.   Neck: Normal range of motion. Neck supple.   Cardiovascular: Normal rate and regular rhythm.    Pulmonary/Chest: Effort normal.   Decreased BLL   Abdominal: Soft. Bowel sounds are normal.   Musculoskeletal: Normal range of motion.   Neurological: He is alert and oriented to person, place, and time.   Skin: Skin is warm and dry.   Psychiatric: He has a normal mood and affect. His behavior is normal.   Nursing note and vitals reviewed.      Significant Labs:  CBC:    Recent Labs  Lab 06/05/18  0500 06/06/18  0537 06/07/18  0537   WBC 3.66* 4.45 3.98   RBC 2.74* 2.75* 2.88*   HGB 8.2* 8.2* 8.7*   HCT 27.0* 27.3* 28.7*    225 238   MCV 99* 99* 100*   MCH 29.9 29.8 30.2   MCHC 30.4* 30.0* 30.3*     BNP:  No results for input(s): BNP in the last 168 hours.    Invalid input(s): BNPTRIAGELBLO  CMP:    Recent Labs  Lab 06/05/18  0500 06/06/18  0537 06/07/18  0537   * 164* 165*   CALCIUM 8.3* 8.1* 8.5*   ALBUMIN 2.0* 1.9* 2.3*   PROT 5.3* 5.1* 5.3*    136 137   K 3.4* 3.3* 4.0   CO2 25 22* 23    102 105   BUN 10 14 8   CREATININE 1.9* 3.0* 2.0*   ALKPHOS 117 109 110   ALT 7* 7* 6*   AST 15 12 13   BILITOT 0.4 0.3 0.3      Coagulation:   No results for input(s): PT, INR, APTT in the last 168 hours.  LDH:  No results for input(s): LDH in the last 72 hours.  Microbiology:  Microbiology Results (last 7 days)     Procedure Component Value Units Date/Time    Fungus culture [[id number]] Collected:  06/01/18 1157    Order Status:  Completed Specimen:  Body Fluid from Lung, Lingula Updated:  06/06/18 1001     Fungus (Mycology) Culture Culture in progress    Narrative:       Bronchial Wash    AFB Culture & Smear [[id number]] Collected:  06/01/18 1157    Order Status:  Completed Specimen:  Body Fluid from Lung, Lingula Updated:  06/04/18 1455     AFB Culture & Smear Culture in progress     AFB CULTURE STAIN No acid fast  bacilli seen.    Culture, Respiratory [[id number]] Collected:  06/01/18 1156    Order Status:  Completed Specimen:  Respiratory from BAL, LLL Updated:  06/04/18 0917     Respiratory Culture Normal respiratory hank     Gram Stain (Respiratory) <10 epithelial cells per low power field.     Gram Stain (Respiratory) Rare WBC's     Gram Stain (Respiratory) No organisms seen    Blood culture #2 **CANNOT BE ORDERED STAT** [[id number]] Collected:  05/29/18 0319    Order Status:  Completed Specimen:  Blood from Peripheral, Hand, Left Updated:  06/03/18 0612     Blood Culture, Routine No growth after 5 days.    Blood culture #1 **CANNOT BE ORDERED STAT** [[id number]] Collected:  05/29/18 0303    Order Status:  Completed Specimen:  Blood from Peripheral, Wrist, Right Updated:  06/03/18 0612     Blood Culture, Routine No growth after 5 days.    C Diff Toxin by PCR [[id number]] Collected:  06/01/18 1514    Order Status:  Completed Updated:  06/02/18 0404     C. diff PCR Negative    Clostridium difficile EIA [[id number]]  (Abnormal) Collected:  06/01/18 1514    Order Status:  Completed Specimen:  Stool from Stool Updated:  06/02/18 0319     C. diff Antigen Positive (A)     C difficile Toxins A+B, EIA Negative     Comment: Testing not recommended for children <24 months old.       KOH prep [[id number]] Collected:  06/01/18 1157    Order Status:  Completed Specimen:  Body Fluid from Lung, Lingula Updated:  06/01/18 1731     KOH Prep No yeast or fungal elements seen    Narrative:       Bronchial Wash    Respiratory Viral Panel by PCR [facility]; Sputum (BAL) [[id number]] Collected:  06/01/18 1157    Order Status:  Sent Specimen:  Nasopharyngeal Updated:  06/01/18 1458    Culture, Respiratory with Gram Stain [[id number]] Collected:  06/01/18 1156    Order Status:  Canceled Specimen:  Respiratory from BAL, LLL Updated:  06/01/18 1457    Gram stain [[id number]] Collected:  06/01/18 1157    Order Status:  Canceled Specimen:  Body Fluid from Lung,  Sergio Updated:  06/01/18 1456          I have reviewed all pertinent labs within the past 24 hours.    Estimated Creatinine Clearance: 44.1 mL/min (A) (based on SCr of 2 mg/dL (H)).    Diagnostic Results:  I have reviewed and interpreted all pertinent imaging results/findings within the past 24 hours.

## 2018-06-09 NOTE — PROGRESS NOTES
[facility]  Heart Transplant  Progress Note    Patient Name: Lv Crocker  MRN: [id number]  Admission Date: 5/29/2018  Hospital Length of Stay: 11 days  Attending Physician: Jose A Lloyd MD  Primary Care Provider: Philippe Mohr MD  Principal Problem:Fever    Subjective:     Interval History: HD done yesterday, awaiting approval for outpatient chair     Continuous Infusions:   albumin human 25%       Scheduled Meds:   sodium chloride 0.9%   Intravenous Once    aspirin  81 mg Oral Daily    cetirizine  5 mg Oral Daily    epoetin jose miguel (PROCRIT) injection  10,000 Units Intravenous Every Mon, Wed, Fri    escitalopram oxalate  20 mg Oral Daily    gabapentin  100 mg Oral TID    heparin (porcine)  5,000 Units Subcutaneous Q12H    insulin detemir U-100  6 Units Subcutaneous BID    levothyroxine  137 mcg Oral Before breakfast    midodrine  10 mg Oral TID WM    mycophenolate  250 mg Oral BID    pantoprazole  40 mg Oral Daily    pravastatin  40 mg Oral Daily    predniSONE  2.5 mg Oral Daily    QUEtiapine  50 mg Oral QHS    tacrolimus  5 mg Oral BID     PRN Meds:sodium chloride 0.9%, sodium chloride 0.9%, sodium chloride 0.9%, acetaminophen, albumin human 25%, ALPRAZolam, dextrose 50%, dextrose 50%, glucagon (human recombinant), glucose, glucose, heparin (porcine), insulin aspart U-100, midodrine, ondansetron, oxyCODONE, promethazine (PHENERGAN) IVPB    Review of patient's allergies indicates:   Allergen Reactions    No known drug allergies      Objective:     Vital Signs (Most Recent):  Temp: 98.4 °F (36.9 °C) (06/09/18 0745)  Pulse: (!) 113 (06/09/18 0745)  Resp: 17 (06/09/18 0745)  BP: (!) 90/48 (06/09/18 0745)  SpO2: 97 % (06/09/18 0745) Vital Signs (24h Range):  Temp:  [97.5 °F (36.4 °C)-98.9 °F (37.2 °C)] 98.4 °F (36.9 °C)  Pulse:  [] 113  Resp:  [16-20] 17  SpO2:  [92 %-99 %] 97 %  BP: ()/(48-71) 90/48     Patient Vitals for the past 72 hrs (Last 3 readings):    Weight   06/09/18 0400 76.4 kg (168 lb 6.9 oz)     Body mass index is 26.37 kg/m².      Intake/Output Summary (Last 24 hours) at 06/09/18 0915  Last data filed at 06/09/18 0557   Gross per 24 hour   Intake             1300 ml   Output             2200 ml   Net             -900 ml       Hemodynamic Parameters:         Physical Exam   Constitutional: He is oriented to person, place, and time. He appears well-developed and well-nourished.   HENT:   Head: Normocephalic.   Eyes: Pupils are equal, round, and reactive to light.   Neck: Normal range of motion. Neck supple.   Cardiovascular: Normal rate and regular rhythm.    Pulmonary/Chest: Effort normal.   Decreased BLL   Abdominal: Soft. Bowel sounds are normal.   Musculoskeletal: Normal range of motion.   Neurological: He is alert and oriented to person, place, and time.   Skin: Skin is warm and dry.   Psychiatric: He has a normal mood and affect. His behavior is normal.   Nursing note and vitals reviewed.      Significant Labs:  CBC:    Recent Labs  Lab 06/07/18  0537 06/08/18  1256 06/09/18  0609   WBC 3.98 4.59 4.29   RBC 2.88* 2.96* 3.01*   HGB 8.7* 8.7* 9.0*   HCT 28.7* 29.5* 30.3*    265 254   * 100* 101*   MCH 30.2 29.4 29.9   MCHC 30.3* 29.5* 29.7*     BNP:  No results for input(s): BNP in the last 168 hours.    Invalid input(s): BNPTRIAGELBLO  CMP:    Recent Labs  Lab 06/07/18  0537 06/08/18  1256 06/09/18  0609   * 185* 107   CALCIUM 8.5* 8.7 8.8   ALBUMIN 2.3* 2.3* 2.4*   PROT 5.3* 5.6* 5.6*    134* 136   K 4.0 4.5 4.1   CO2 23 22* 19*    103 105   BUN 8 16 7   CREATININE 2.0* 3.2* 2.2*   ALKPHOS 110 120 121   ALT 6* 6* 8*   AST 13 13 15   BILITOT 0.3 0.4 0.4      Coagulation:   No results for input(s): PT, INR, APTT in the last 168 hours.  LDH:  No results for input(s): LDH in the last 72 hours.  Microbiology:  Microbiology Results (last 7 days)     Procedure Component Value Units Date/Time    Fungus culture [[id number]]  Collected:  06/01/18 1157    Order Status:  Completed Specimen:  Body Fluid from Lung, Lingula Updated:  06/06/18 1001     Fungus (Mycology) Culture Culture in progress    Narrative:       Bronchial Wash    AFB Culture & Smear [[id number]] Collected:  06/01/18 1157    Order Status:  Completed Specimen:  Body Fluid from Lung, Lingula Updated:  06/04/18 1455     AFB Culture & Smear Culture in progress     AFB CULTURE STAIN No acid fast bacilli seen.    Culture, Respiratory [[id number]] Collected:  06/01/18 1156    Order Status:  Completed Specimen:  Respiratory from BAL, LLL Updated:  06/04/18 0917     Respiratory Culture Normal respiratory hank     Gram Stain (Respiratory) <10 epithelial cells per low power field.     Gram Stain (Respiratory) Rare WBC's     Gram Stain (Respiratory) No organisms seen    Blood culture #2 **CANNOT BE ORDERED STAT** [[id number]] Collected:  05/29/18 0319    Order Status:  Completed Specimen:  Blood from Peripheral, Hand, Left Updated:  06/03/18 0612     Blood Culture, Routine No growth after 5 days.    Blood culture #1 **CANNOT BE ORDERED STAT** [[id number]] Collected:  05/29/18 0303    Order Status:  Completed Specimen:  Blood from Peripheral, Wrist, Right Updated:  06/03/18 0612     Blood Culture, Routine No growth after 5 days.          I have reviewed all pertinent labs within the past 24 hours.    Estimated Creatinine Clearance: 40.1 mL/min (A) (based on SCr of 2.2 mg/dL (H)).    Diagnostic Results:  I have reviewed and interpreted all pertinent imaging results/findings within the past 24 hours.    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 She denies him having any SOB, chest pain, diarrhea, nausea, vomiting. He doesnot make any urine since he has been non HD. Fever was noted to be as high as 101.6.She also notes that he has been coughing since being in ER but not before that.No SOB with PT at rehab.No dizziness or syncope. Wife reports he got an extra HD on Thursday because nephrology wanted to remove extra fluid.He has had a good appetite and tolerating PO and per wife has been working with PT and getting stronger. He has finished his abx course from last visit already.    * Fever    - Resolved.  -CT scan on admission + for congestion but equivocal for pneumonia. Present of moderate R- pleural effusion.  - Bronch cultures NGTD.   - Blood Cultures and resp panel continues to be negative including RSV.  - CMV weakly positive. Will repeat today per ID recs.   - Stopped cefepime and vanco per ID as he has completed 7 day course.        Diarrhea of presumed infectious origin    - Resolved  - could be from current abx  - C- diff negative. CMV pcr neg.         Pulmonary infiltrates on CXR    - As above.        Depression    - continue current at home depression regimen.          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   - Podiatry on board  - Foot XR and dopplers shows no changes concerning for infection          ESRD (end stage renal disease)    - on HD M, W, F.  - continue HD as scheduled. Appreciate nephrology assistance        Pleural effusion    -Persistent right sided pleural effusion  -Ct chest show moderate amount.   -S/P bronch and lavage (6/1).          Type 1 diabetes mellitus with stage 3 chronic kidney disease    Insulin sliding scale  -continue detemir  - endocrine consult        Hypothyroidism due to Hashimoto's thyroiditis    - continue synthroid at home  dose  - TSH 12.4, T4 4.4   - subclinical hypothyroidism.         Heart transplanted    -S/p OHT 2014  -Continue prednisone, cellcept and tacrolimus  -continue tacro 5/5 BID. Goal is 5-10  -Plan to adjust today as needed        Physical debility    - secondary to prolonged illness  - PT/OT in patient and d/c back to rehab once cleared  - Working on getting to a rehab closer to home in [place].              JOSE DANIEL Zabala  Heart Transplant  [facility]

## 2018-06-09 NOTE — PLAN OF CARE
Patient's VSS and in NAD for shift. Patient is AAOx4, calm, cooperative, and a 1 person assist from chair to bed. Patient is waiting for outpatient HD chair and transfer to rehab closer to his home in [place]. Patient worked with PT today and walked the hallway. Appetite good and patient taking all medications as ordered. No other events to report.

## 2018-06-09 NOTE — SUBJECTIVE & OBJECTIVE
Interval History: HD done yesterday, awaiting approval for outpatient chair     Continuous Infusions:   albumin human 25%       Scheduled Meds:   sodium chloride 0.9%   Intravenous Once    aspirin  81 mg Oral Daily    cetirizine  5 mg Oral Daily    epoetin jose miguel (PROCRIT) injection  10,000 Units Intravenous Every Mon, Wed, Fri    escitalopram oxalate  20 mg Oral Daily    gabapentin  100 mg Oral TID    heparin (porcine)  5,000 Units Subcutaneous Q12H    insulin detemir U-100  6 Units Subcutaneous BID    levothyroxine  137 mcg Oral Before breakfast    midodrine  10 mg Oral TID WM    mycophenolate  250 mg Oral BID    pantoprazole  40 mg Oral Daily    pravastatin  40 mg Oral Daily    predniSONE  2.5 mg Oral Daily    QUEtiapine  50 mg Oral QHS    tacrolimus  5 mg Oral BID     PRN Meds:sodium chloride 0.9%, sodium chloride 0.9%, sodium chloride 0.9%, acetaminophen, albumin human 25%, ALPRAZolam, dextrose 50%, dextrose 50%, glucagon (human recombinant), glucose, glucose, heparin (porcine), insulin aspart U-100, midodrine, ondansetron, oxyCODONE, promethazine (PHENERGAN) IVPB    Review of patient's allergies indicates:   Allergen Reactions    No known drug allergies      Objective:     Vital Signs (Most Recent):  Temp: 98.4 °F (36.9 °C) (06/09/18 0745)  Pulse: (!) 113 (06/09/18 0745)  Resp: 17 (06/09/18 0745)  BP: (!) 90/48 (06/09/18 0745)  SpO2: 97 % (06/09/18 0745) Vital Signs (24h Range):  Temp:  [97.5 °F (36.4 °C)-98.9 °F (37.2 °C)] 98.4 °F (36.9 °C)  Pulse:  [] 113  Resp:  [16-20] 17  SpO2:  [92 %-99 %] 97 %  BP: ()/(48-71) 90/48     Patient Vitals for the past 72 hrs (Last 3 readings):   Weight   06/09/18 0400 76.4 kg (168 lb 6.9 oz)     Body mass index is 26.37 kg/m².      Intake/Output Summary (Last 24 hours) at 06/09/18 0915  Last data filed at 06/09/18 0557   Gross per 24 hour   Intake             1300 ml   Output             2200 ml   Net             -900 ml       Hemodynamic  Parameters:         Physical Exam   Constitutional: He is oriented to person, place, and time. He appears well-developed and well-nourished.   HENT:   Head: Normocephalic.   Eyes: Pupils are equal, round, and reactive to light.   Neck: Normal range of motion. Neck supple.   Cardiovascular: Normal rate and regular rhythm.    Pulmonary/Chest: Effort normal.   Decreased BLL   Abdominal: Soft. Bowel sounds are normal.   Musculoskeletal: Normal range of motion.   Neurological: He is alert and oriented to person, place, and time.   Skin: Skin is warm and dry.   Psychiatric: He has a normal mood and affect. His behavior is normal.   Nursing note and vitals reviewed.      Significant Labs:  CBC:    Recent Labs  Lab 06/07/18  0537 06/08/18  1256 06/09/18  0609   WBC 3.98 4.59 4.29   RBC 2.88* 2.96* 3.01*   HGB 8.7* 8.7* 9.0*   HCT 28.7* 29.5* 30.3*    265 254   * 100* 101*   MCH 30.2 29.4 29.9   MCHC 30.3* 29.5* 29.7*     BNP:  No results for input(s): BNP in the last 168 hours.    Invalid input(s): BNPTRIAGELBLO  CMP:    Recent Labs  Lab 06/07/18  0537 06/08/18  1256 06/09/18  0609   * 185* 107   CALCIUM 8.5* 8.7 8.8   ALBUMIN 2.3* 2.3* 2.4*   PROT 5.3* 5.6* 5.6*    134* 136   K 4.0 4.5 4.1   CO2 23 22* 19*    103 105   BUN 8 16 7   CREATININE 2.0* 3.2* 2.2*   ALKPHOS 110 120 121   ALT 6* 6* 8*   AST 13 13 15   BILITOT 0.3 0.4 0.4      Coagulation:   No results for input(s): PT, INR, APTT in the last 168 hours.  LDH:  No results for input(s): LDH in the last 72 hours.  Microbiology:  Microbiology Results (last 7 days)     Procedure Component Value Units Date/Time    Fungus culture [[id number]] Collected:  06/01/18 1157    Order Status:  Completed Specimen:  Body Fluid from Lung, Lingula Updated:  06/06/18 1001     Fungus (Mycology) Culture Culture in progress    Narrative:       Bronchial Wash    AFB Culture & Smear [[id number]] Collected:  06/01/18 1157    Order Status:  Completed Specimen:   Body Fluid from Lung, Lingula Updated:  06/04/18 1455     AFB Culture & Smear Culture in progress     AFB CULTURE STAIN No acid fast bacilli seen.    Culture, Respiratory [[id number]] Collected:  06/01/18 1156    Order Status:  Completed Specimen:  Respiratory from BAL, LLL Updated:  06/04/18 0917     Respiratory Culture Normal respiratory hank     Gram Stain (Respiratory) <10 epithelial cells per low power field.     Gram Stain (Respiratory) Rare WBC's     Gram Stain (Respiratory) No organisms seen    Blood culture #2 **CANNOT BE ORDERED STAT** [[id number]] Collected:  05/29/18 0319    Order Status:  Completed Specimen:  Blood from Peripheral, Hand, Left Updated:  06/03/18 0612     Blood Culture, Routine No growth after 5 days.    Blood culture #1 **CANNOT BE ORDERED STAT** [[id number]] Collected:  05/29/18 0303    Order Status:  Completed Specimen:  Blood from Peripheral, Wrist, Right Updated:  06/03/18 0612     Blood Culture, Routine No growth after 5 days.          I have reviewed all pertinent labs within the past 24 hours.    Estimated Creatinine Clearance: 40.1 mL/min (A) (based on SCr of 2.2 mg/dL (H)).    Diagnostic Results:  I have reviewed and interpreted all pertinent imaging results/findings within the past 24 hours.

## 2018-06-09 NOTE — PT/OT/SLP PROGRESS
"Physical Therapy Treatment    Patient Name:  Lv Crocker   MRN:  [id number]    Recommendations:     Discharge Recommendations:  rehabilitation facility   Discharge Equipment Recommendations:  (TBD)   Barriers to discharge: Inaccessible home    Assessment:     Lv Crocker is a 42 y.o. male admitted with a medical diagnosis of Fever.  He presents with the following impairments/functional limitations:  weakness, impaired endurance, impaired self care skills, impaired functional mobilty, gait instability, impaired balance, decreased lower extremity function, edema, impaired cardiopulmonary response to activity, orthopedic precautions . Patient showed difficulty transferring from sit to stand due to B LE swelling which restrict B knee flexion, therefore affect transfer mechanics. Patient fatigued easily with all activites, but showed good determination to complete his task.    Rehab Prognosis:  good; patient would benefit from acute skilled PT services to address these deficits and reach maximum level of function.      Recent Surgery: * No surgery found *      Plan:     During this hospitalization, patient to be seen 5 x/week to address the above listed problems via gait training, therapeutic activities, therapeutic exercises, neuromuscular re-education  · Plan of Care Expires:  06/30/18   Plan of Care Reviewed with: patient    Subjective     Communicated with RN prior to session.  Patient found in supine upon PT entry to room, agreeable to treatment.   Patient states " I feel fine, I'm just weak and tired".     Chief Complaint: fatigue and weakness  Patient comments/goals: to get stronger  Pain/Comfort:  · Pain Rating 1: 0/10  · Pain Rating Post-Intervention 1: 0/10    Patients cultural, spiritual, Mormon conflicts given the current situation: None    Objective:     Patient found with: pressure relief boots, telemetry     General Precautions: Standard, fall   Orthopedic Precautions:RLE weight " bearing as tolerated, LLE weight bearing as tolerated   Braces:  (Darco shoes)     Functional Mobility:  · Bed Mobility:     · Rolling Right: minimum assistance  · Supine to Sit: moderate assistance  · Transfers:     · Sit to Stand:  maximal assistance with rolling walker  · Bed to Chair: minimum assistance with  rolling walker  using  Stand Pivot  · Gait: 14 ft x 2 trials with RW and min to mod assistance with chair follow.      AM-PAC 6 CLICK MOBILITY  Turning over in bed (including adjusting bedclothes, sheets and blankets)?: 3  Sitting down on and standing up from a chair with arms (e.g., wheelchair, bedside commode, etc.): 2  Moving from lying on back to sitting on the side of the bed?: 2  Moving to and from a bed to a chair (including a wheelchair)?: 3  Need to walk in hospital room?: 2  Climbing 3-5 steps with a railing?: 1  Total Score: 13       Therapeutic Activities and Exercises:   Donned a second gown and Darco Shoes. There ex in sitting: LAQ, HIP FLEX AND HEEL/TOE RAISES 2X15 REPS B LE.    Patient left up in chair with all lines intact, call button in reach, RN notified and spouse present..    GOALS:    Physical Therapy Goals        Problem: Physical Therapy Goal    Goal Priority Disciplines Outcome Goal Variances Interventions   Physical Therapy Goal     PT/OT, PT Ongoing (interventions implemented as appropriate)     Description:  Goals to be met by: 18     Patient will increase functional independence with mobility by performin. Supine to sit with Stand by Assistance. -not met  2. Sit to supine with Stand by Assistance. -not met  3. Rolling to Left and Right with [place]. -not met  4. Sit to stand transfer with Minimum Assistance. -not met  5. Gait  x 20 feet with Minimum Assistance. -not met  6. Ascend/descend 4 stairs with bilateral Handrails CGA. -not met  7. Lower extremity exercise program x20 reps per handout, with independence. -not met                       Time Tracking:      PT Received On: 06/09/18  PT Start Time: 1037     PT Stop Time: 1116  PT Total Time (min): 39 min     Billable Minutes: Gait Training 15, Therapeutic Activity 15 and Therapeutic Exercise 9    Treatment Type: Treatment  PT/PTA: PTA     PTA Visit Number: 1     Jamel Hoang, PTA  06/09/2018

## 2018-06-09 NOTE — PLAN OF CARE
Problem: Physical Therapy Goal  Goal: Physical Therapy Goal  Goals to be met by: 18     Patient will increase functional independence with mobility by performin. Supine to sit with Stand by Assistance. -not met  2. Sit to supine with Stand by Assistance. -not met  3. Rolling to Left and Right with Taney. -not met  4. Sit to stand transfer with Minimum Assistance. -not met  5. Gait  x 20 feet with Minimum Assistance. -not met  6. Ascend/descend 4 stairs with bilateral Handrails CGA. -not met  7. Lower extremity exercise program x20 reps per handout, with independence. -not met      Outcome: Ongoing (interventions implemented as appropriate)  Improvement noted on Patient's ability to ambulate. But endurance and strength continues to be limited.

## 2018-06-10 NOTE — SUBJECTIVE & OBJECTIVE
Interval History: having increased frequency of diarrhea, no fevers     Continuous Infusions:   albumin human 25%       Scheduled Meds:   sodium chloride 0.9%   Intravenous Once    aspirin  81 mg Oral Daily    cetirizine  5 mg Oral Daily    epoetin jose miguel (PROCRIT) injection  10,000 Units Intravenous Every Mon, Wed, Fri    escitalopram oxalate  20 mg Oral Daily    gabapentin  100 mg Oral TID    heparin (porcine)  5,000 Units Subcutaneous Q12H    insulin detemir U-100  6 Units Subcutaneous BID    levothyroxine  137 mcg Oral Before breakfast    midodrine  10 mg Oral TID WM    mycophenolate  250 mg Oral BID    pantoprazole  40 mg Oral Daily    pravastatin  40 mg Oral Daily    predniSONE  2.5 mg Oral Daily    QUEtiapine  50 mg Oral QHS    tacrolimus  5 mg Oral BID     PRN Meds:sodium chloride 0.9%, sodium chloride 0.9%, sodium chloride 0.9%, acetaminophen, albumin human 25%, ALPRAZolam, dextrose 50%, dextrose 50%, glucagon (human recombinant), glucose, glucose, heparin (porcine), insulin aspart U-100, midodrine, ondansetron, oxyCODONE, promethazine (PHENERGAN) IVPB    Review of patient's allergies indicates:   Allergen Reactions    No known drug allergies      Objective:     Vital Signs (Most Recent):  Temp: 98.6 °F (37 °C) (06/10/18 1225)  Pulse: 110 (06/10/18 1225)  Resp: 18 (06/10/18 1225)  BP: 111/72 (06/10/18 1225)  SpO2: 97 % (06/10/18 1225) Vital Signs (24h Range):  Temp:  [98.3 °F (36.8 °C)-98.8 °F (37.1 °C)] 98.6 °F (37 °C)  Pulse:  [] 110  Resp:  [14-20] 18  SpO2:  [95 %-100 %] 97 %  BP: ()/(55-72) 111/72     Patient Vitals for the past 72 hrs (Last 3 readings):   Weight   06/10/18 0600 76.5 kg (168 lb 10.4 oz)   06/09/18 0400 76.4 kg (168 lb 6.9 oz)     Body mass index is 26.41 kg/m².      Intake/Output Summary (Last 24 hours) at 06/10/18 1235  Last data filed at 06/10/18 0511   Gross per 24 hour   Intake              660 ml   Output                0 ml   Net              660 ml        Hemodynamic Parameters:           Physical Exam   Constitutional: He is oriented to person, place, and time. He appears well-developed and well-nourished.   HENT:   Head: Normocephalic.   Eyes: Pupils are equal, round, and reactive to light.   Neck: Normal range of motion. Neck supple.   Cardiovascular: Normal rate and regular rhythm.    Pulmonary/Chest: Effort normal.   Decreased BLL   Abdominal: Soft. Bowel sounds are normal.   Musculoskeletal: Normal range of motion.   Neurological: He is alert and oriented to person, place, and time.   Skin: Skin is warm and dry.   Psychiatric: He has a normal mood and affect. His behavior is normal.   Nursing note and vitals reviewed.      Significant Labs:  CBC:    Recent Labs  Lab 06/08/18  1256 06/09/18  0609 06/10/18  0418   WBC 4.59 4.29 3.91   RBC 2.96* 3.01* 2.92*   HGB 8.7* 9.0* 8.6*   HCT 29.5* 30.3* 29.3*    254 231   * 101* 100*   MCH 29.4 29.9 29.5   MCHC 29.5* 29.7* 29.4*     BNP:  No results for input(s): BNP in the last 168 hours.    Invalid input(s): BNPTRIAGELBLO  CMP:    Recent Labs  Lab 06/08/18  1256 06/09/18  0609 06/10/18  0418   * 107 163*   CALCIUM 8.7 8.8 8.6*   ALBUMIN 2.3* 2.4* 2.3*   PROT 5.6* 5.6* 5.5*   * 136 134*   K 4.5 4.1 4.3   CO2 22* 19* 21*    105 104   BUN 16 7 12   CREATININE 3.2* 2.2* 3.2*   ALKPHOS 120 121 122   ALT 6* 8* 7*   AST 13 15 13   BILITOT 0.4 0.4 0.4      Coagulation:   No results for input(s): PT, INR, APTT in the last 168 hours.  LDH:  No results for input(s): LDH in the last 72 hours.  Microbiology:  Microbiology Results (last 7 days)     Procedure Component Value Units Date/Time    Fungus culture [[id number]] Collected:  06/01/18 1157    Order Status:  Completed Specimen:  Body Fluid from Lung, Lingula Updated:  06/06/18 1001     Fungus (Mycology) Culture Culture in progress    Narrative:       Bronchial Wash    AFB Culture & Smear [[id number]] Collected:  06/01/18 1157    Order Status:   Completed Specimen:  Body Fluid from Lung, Lingula Updated:  06/04/18 1455     AFB Culture & Smear Culture in progress     AFB CULTURE STAIN No acid fast bacilli seen.    Culture, Respiratory [[id number]] Collected:  06/01/18 1156    Order Status:  Completed Specimen:  Respiratory from BAL, LLL Updated:  06/04/18 0917     Respiratory Culture Normal respiratory hank     Gram Stain (Respiratory) <10 epithelial cells per low power field.     Gram Stain (Respiratory) Rare WBC's     Gram Stain (Respiratory) No organisms seen          I have reviewed all pertinent labs within the past 24 hours.    Estimated Creatinine Clearance: 27.5 mL/min (A) (based on SCr of 3.2 mg/dL (H)).    Diagnostic Results:  I have reviewed and interpreted all pertinent imaging results/findings within the past 24 hours.

## 2018-06-10 NOTE — PLAN OF CARE
Patient's VSS for shift today. Patient is AAOx4, calm, cooperative, and a 1 person assist from bed to chair. Patient remained in bed for shift. States he is eager to be transferred back to rehab hospital where rehab is more intense. Retrieved stool specimen for C Diff but it was too formed to be considered. Informed Dr Perry. Asked me to cancel C Diff orders and isolation. Patient still refusing to have peripheral IV changed as he states it is not being used and he is only waiting on insurance clearance to use.

## 2018-06-10 NOTE — SIGNIFICANT EVENT
"Notified Dr. Blancas of patient's peripheral IV in right hand being 6 days old, with patient requesting to not have it replaced due to his upcoming discharge ("Monday") and history of difficulty obtaining peripheral IV access. Patient currently not receiving any medications IV and has left SC tunneled HD line. MD verbalized to leave peripheral IV in place for now.  "

## 2018-06-10 NOTE — ASSESSMENT & PLAN NOTE
- Resolved.  -CT scan on admission + for congestion but equivocal for pneumonia. Present of moderate R- pleural effusion.  - Bronch cultures NGTD.   - Blood Cultures and resp panel continues to be negative including RSV.  - Stopped cefepime and vanco per ID as he has completed 7 day course.

## 2018-06-10 NOTE — PROGRESS NOTES
[facility]  Heart Transplant  Progress Note    Patient Name: Lv Crocker  MRN: [id number]  Admission Date: 5/29/2018  Hospital Length of Stay: 12 days  Attending Physician: Jose A Lloyd MD  Primary Care Provider: Philippe Mohr MD  Principal Problem:Fever    Subjective:     Interval History: having increased frequency of diarrhea, no fevers     Continuous Infusions:   albumin human 25%       Scheduled Meds:   sodium chloride 0.9%   Intravenous Once    aspirin  81 mg Oral Daily    cetirizine  5 mg Oral Daily    epoetin jose miguel (PROCRIT) injection  10,000 Units Intravenous Every Mon, Wed, Fri    escitalopram oxalate  20 mg Oral Daily    gabapentin  100 mg Oral TID    heparin (porcine)  5,000 Units Subcutaneous Q12H    insulin detemir U-100  6 Units Subcutaneous BID    levothyroxine  137 mcg Oral Before breakfast    midodrine  10 mg Oral TID WM    mycophenolate  250 mg Oral BID    pantoprazole  40 mg Oral Daily    pravastatin  40 mg Oral Daily    predniSONE  2.5 mg Oral Daily    QUEtiapine  50 mg Oral QHS    tacrolimus  5 mg Oral BID     PRN Meds:sodium chloride 0.9%, sodium chloride 0.9%, sodium chloride 0.9%, acetaminophen, albumin human 25%, ALPRAZolam, dextrose 50%, dextrose 50%, glucagon (human recombinant), glucose, glucose, heparin (porcine), insulin aspart U-100, midodrine, ondansetron, oxyCODONE, promethazine (PHENERGAN) IVPB    Review of patient's allergies indicates:   Allergen Reactions    No known drug allergies      Objective:     Vital Signs (Most Recent):  Temp: 98.6 °F (37 °C) (06/10/18 1225)  Pulse: 110 (06/10/18 1225)  Resp: 18 (06/10/18 1225)  BP: 111/72 (06/10/18 1225)  SpO2: 97 % (06/10/18 1225) Vital Signs (24h Range):  Temp:  [98.3 °F (36.8 °C)-98.8 °F (37.1 °C)] 98.6 °F (37 °C)  Pulse:  [] 110  Resp:  [14-20] 18  SpO2:  [95 %-100 %] 97 %  BP: ()/(55-72) 111/72     Patient Vitals for the past 72 hrs (Last 3 readings):   Weight    06/10/18 0600 76.5 kg (168 lb 10.4 oz)   06/09/18 0400 76.4 kg (168 lb 6.9 oz)     Body mass index is 26.41 kg/m².      Intake/Output Summary (Last 24 hours) at 06/10/18 1235  Last data filed at 06/10/18 0511   Gross per 24 hour   Intake              660 ml   Output                0 ml   Net              660 ml       Hemodynamic Parameters:           Physical Exam   Constitutional: He is oriented to person, place, and time. He appears well-developed and well-nourished.   HENT:   Head: Normocephalic.   Eyes: Pupils are equal, round, and reactive to light.   Neck: Normal range of motion. Neck supple.   Cardiovascular: Normal rate and regular rhythm.    Pulmonary/Chest: Effort normal.   Decreased BLL   Abdominal: Soft. Bowel sounds are normal.   Musculoskeletal: Normal range of motion.   Neurological: He is alert and oriented to person, place, and time.   Skin: Skin is warm and dry.   Psychiatric: He has a normal mood and affect. His behavior is normal.   Nursing note and vitals reviewed.      Significant Labs:  CBC:    Recent Labs  Lab 06/08/18  1256 06/09/18  0609 06/10/18  0418   WBC 4.59 4.29 3.91   RBC 2.96* 3.01* 2.92*   HGB 8.7* 9.0* 8.6*   HCT 29.5* 30.3* 29.3*    254 231   * 101* 100*   MCH 29.4 29.9 29.5   MCHC 29.5* 29.7* 29.4*     BNP:  No results for input(s): BNP in the last 168 hours.    Invalid input(s): BNPTRIAGELBLO  CMP:    Recent Labs  Lab 06/08/18  1256 06/09/18  0609 06/10/18  0418   * 107 163*   CALCIUM 8.7 8.8 8.6*   ALBUMIN 2.3* 2.4* 2.3*   PROT 5.6* 5.6* 5.5*   * 136 134*   K 4.5 4.1 4.3   CO2 22* 19* 21*    105 104   BUN 16 7 12   CREATININE 3.2* 2.2* 3.2*   ALKPHOS 120 121 122   ALT 6* 8* 7*   AST 13 15 13   BILITOT 0.4 0.4 0.4      Coagulation:   No results for input(s): PT, INR, APTT in the last 168 hours.  LDH:  No results for input(s): LDH in the last 72 hours.  Microbiology:  Microbiology Results (last 7 days)     Procedure Component Value Units  Date/Time    Fungus culture [[id number]] Collected:  06/01/18 1157    Order Status:  Completed Specimen:  Body Fluid from Lung, Lingula Updated:  06/06/18 1001     Fungus (Mycology) Culture Culture in progress    Narrative:       Bronchial Wash    AFB Culture & Smear [[id number]] Collected:  06/01/18 1157    Order Status:  Completed Specimen:  Body Fluid from Lung, Lingula Updated:  06/04/18 1455     AFB Culture & Smear Culture in progress     AFB CULTURE STAIN No acid fast bacilli seen.    Culture, Respiratory [[id number]] Collected:  06/01/18 1156    Order Status:  Completed Specimen:  Respiratory from BAL, LLL Updated:  06/04/18 0917     Respiratory Culture Normal respiratory hank     Gram Stain (Respiratory) <10 epithelial cells per low power field.     Gram Stain (Respiratory) Rare WBC's     Gram Stain (Respiratory) No organisms seen          I have reviewed all pertinent labs within the past 24 hours.    Estimated Creatinine Clearance: 27.5 mL/min (A) (based on SCr of 3.2 mg/dL (H)).    Diagnostic Results:  I have reviewed and interpreted all pertinent imaging results/findings within the past 24 hours.    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 She denies him having any SOB, chest pain, diarrhea, nausea, vomiting. He doesnot make any urine since he has been non HD. Fever was noted to be as high as 101.6.She also notes that he has been coughing since being in ER but not before that.No SOB with PT at rehab.No dizziness or syncope. Wife reports he got an extra HD on  Thursday because nephrology wanted to remove extra fluid.He has had a good appetite and tolerating PO and per wife has been working with PT and getting stronger. He has finished his abx course from last visit already.    * Fever    - Resolved.  -CT scan on admission + for congestion but equivocal for pneumonia. Present of moderate R- pleural effusion.  - Bronch cultures NGTD.   - Blood Cultures and resp panel continues to be negative including RSV.  - Stopped cefepime and vanco per ID as he has completed 7 day course.        Diarrhea of presumed infectious origin    -resend c diff today  - could be from current abx  -  CMV pcr neg.         Pulmonary infiltrates on CXR    - As above.        Depression    - continue current at home depression regimen.          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   - Podiatry on board  - Foot XR and dopplers shows no changes concerning for infection          ESRD (end stage renal disease)    - on HD M, W, F.  - continue HD as scheduled. Appreciate nephrology assistance        Pleural effusion    -Persistent right sided pleural effusion  -Ct chest show moderate amount.   -S/P bronch and lavage (6/1).          Type 1 diabetes mellitus with stage 3 chronic kidney disease    Insulin sliding scale  -continue detemir  - endocrine consult        Hypothyroidism due to Hashimoto's thyroiditis    - continue synthroid at home dose  - TSH 12.4, T4 4.4   - subclinical hypothyroidism.         Heart transplanted    -S/p OHT 2014  -Continue prednisone, cellcept and tacrolimus  -continue tacro 5/5 BID. Goal is 5-10  -Plan to adjust today as needed        Physical debility    - secondary to prolonged illness  - PT/OT in patient and d/c back to rehab once cleared  - Working on getting to a rehab closer to home in [place].              JOSE DANIEL Zabala  Heart Transplant  [facility]

## 2018-06-11 NOTE — PROGRESS NOTES
Discharge    Pt presents in room in acute dialysis aaox4 with pleasant affect. Pt states in agreement with plan to discharge to [facility], ph [phone], fax [phone]. Pt placed at [facility] in [place] for outpt HD. Pt will transport via [facility]. GUS arranged for 3:30 . Pt's nurse aware and has # to call report. Pt's wife aware. Pt reports coping well and denies further needs, questions, concerns at this time and none indicated. Providing psychosocial and counseling support, education, resources, assistance and discharge planning as indicated. SW remains available.    Addendum: As pt was being picked up, GUS received call from Lenin at [facility] who states pt not medically accepted yet. GUS stated that rehab had spoken to unit who stated pt could come and would have dialysis on Thursday. Lenin states she would check and call back. GUS informed rehab who stated they were not given any indication there was not full acceptance. GUS received call from Jolene at [facility] who states she will work with the medical director on Wednesday when he returns from vacation to get pt approval. GUS informed rehab of same and requested they transfer pt back to us if pt unable to be dialyzed. SW informing pt's wife.

## 2018-06-11 NOTE — ASSESSMENT & PLAN NOTE
- Dry gangrene developed previous admission with pressor use  - Will continue with betadine rx at [facility] per podiatry  - Foot XR and dopplers shows no changes concerning for infection

## 2018-06-11 NOTE — SUBJECTIVE & OBJECTIVE
Interval History:    No acute overnight event. Last BM well formed. Patient believes episodes of diarrhea are related to Cellcept use. Wife at bedside and mention that Rehab close to home has agreed to take patient. HD session today once approved by nephrology. Denies fever or chill    Continuous Infusions:   albumin human 25%       Scheduled Meds:   sodium chloride 0.9%   Intravenous Once    aspirin  81 mg Oral Daily    cetirizine  5 mg Oral Daily    epoetin jose miguel (PROCRIT) injection  10,000 Units Intravenous Every Mon, Wed, Fri    escitalopram oxalate  20 mg Oral Daily    gabapentin  100 mg Oral TID    heparin (porcine)  5,000 Units Subcutaneous Q12H    insulin detemir U-100  6 Units Subcutaneous BID    levothyroxine  137 mcg Oral Before breakfast    midodrine  10 mg Oral TID WM    mycophenolate  250 mg Oral BID    pantoprazole  40 mg Oral Daily    pravastatin  40 mg Oral Daily    predniSONE  2.5 mg Oral Daily    QUEtiapine  50 mg Oral QHS    tacrolimus  5 mg Oral BID     PRN Meds:sodium chloride 0.9%, sodium chloride 0.9%, sodium chloride 0.9%, acetaminophen, albumin human 25%, ALPRAZolam, dextrose 50%, dextrose 50%, glucagon (human recombinant), glucose, glucose, heparin (porcine), insulin aspart U-100, midodrine, ondansetron, oxyCODONE, promethazine (PHENERGAN) IVPB    Review of patient's allergies indicates:   Allergen Reactions    No known drug allergies      Objective:     Vital Signs (Most Recent):  Temp: 98.2 °F (36.8 °C) (06/11/18 0732)  Pulse: (!) 117 (06/11/18 0759)  Resp: 15 (06/11/18 0732)  BP: (!) 98/58 (06/11/18 0732)  SpO2: 97 % (06/11/18 0732) Vital Signs (24h Range):  Temp:  [97.5 °F (36.4 °C)-98.8 °F (37.1 °C)] 98.2 °F (36.8 °C)  Pulse:  [] 117  Resp:  [15-24] 15  SpO2:  [95 %-100 %] 97 %  BP: ()/(58-72) 98/58     Patient Vitals for the past 72 hrs (Last 3 readings):   Weight   06/11/18 0400 76.8 kg (169 lb 5 oz)   06/10/18 0600 76.5 kg (168 lb 10.4 oz)   06/09/18  0400 76.4 kg (168 lb 6.9 oz)     Body mass index is 26.51 kg/m².      Intake/Output Summary (Last 24 hours) at 06/11/18 0858  Last data filed at 06/11/18 0500   Gross per 24 hour   Intake              600 ml   Output                0 ml   Net              600 ml       Hemodynamic Parameters:       Telemetry:     Physical Exam  Constitutional: He is oriented to person, place, and time.    very comfortable on bed this morning  Eyes: Pupils are equal, round, and reactive to light.   Neck: No JVD present.   Cardiovascular: Normal rate and regular rhythm.    No murmur heard.  Tachycardia present   Pulmonary/Chest: Effort normal and breath sounds normal.   Abdominal: Soft. Bowel sounds are normal.   No fluid wave   Musculoskeletal: He exhibits no edema or tenderness.   Neurological: He is alert and oriented to person, place, and time.   Skin: Skin is warm and dry. Dry gangrene to toes bilaterally. Left great toe gangrene improved and less tender. No drainage  Psychiatric: mood and affect not evaluated but grossly normal  Nursing note reviewed.      Significant Labs:  CBC:    Recent Labs  Lab 06/09/18  0609 06/10/18  0418 06/11/18  0355   WBC 4.29 3.91 4.89   RBC 3.01* 2.92* 3.08*   HGB 9.0* 8.6* 9.3*   HCT 30.3* 29.3* 30.6*    231 265   * 100* 99*   MCH 29.9 29.5 30.2   MCHC 29.7* 29.4* 30.4*     BNP:  No results for input(s): BNP in the last 168 hours.    Invalid input(s): BNPTRIAGELBLO  CMP:    Recent Labs  Lab 06/09/18  0609 06/10/18  0418 06/11/18  0355    163* 122*   CALCIUM 8.8 8.6* 8.6*   ALBUMIN 2.4* 2.3* 2.3*   PROT 5.6* 5.5* 5.6*    134* 134*   K 4.1 4.3 4.8   CO2 19* 21* 19*    104 104   BUN 7 12 17   CREATININE 2.2* 3.2* 4.1*   ALKPHOS 121 122 123   ALT 8* 7* 7*   AST 15 13 13   BILITOT 0.4 0.4 0.4      Coagulation:   No results for input(s): PT, INR, APTT in the last 168 hours.  LDH:  No results for input(s): LDH in the last 72 hours.  Microbiology:  Microbiology Results (last  7 days)     Procedure Component Value Units Date/Time    Clostridium difficile EIA [[id number]]     Order Status:  Canceled Specimen:  Stool from Stool     Fungus culture [[id number]] Collected:  06/01/18 1157    Order Status:  Completed Specimen:  Body Fluid from Lung, Lingula Updated:  06/06/18 1001     Fungus (Mycology) Culture Culture in progress    Narrative:       Bronchial Wash    AFB Culture & Smear [[id number]] Collected:  06/01/18 1157    Order Status:  Completed Specimen:  Body Fluid from Lung, Lingula Updated:  06/04/18 1455     AFB Culture & Smear Culture in progress     AFB CULTURE STAIN No acid fast bacilli seen.    Culture, Respiratory [[id number]] Collected:  06/01/18 1156    Order Status:  Completed Specimen:  Respiratory from BAL, LLL Updated:  06/04/18 0917     Respiratory Culture Normal respiratory hank     Gram Stain (Respiratory) <10 epithelial cells per low power field.     Gram Stain (Respiratory) Rare WBC's     Gram Stain (Respiratory) No organisms seen          I have reviewed all pertinent labs within the past 24 hours.    Estimated Creatinine Clearance: 21.5 mL/min (A) (based on SCr of 4.1 mg/dL (H)).    Diagnostic Results:

## 2018-06-11 NOTE — PLAN OF CARE
Discharge Planning:    Nursing reports that Mr. Crocker is having an average of 2 BM's daily. LBM 6/10 pm.  Patient is currently on a Cardiac Diet.    SW working on transfer/acceptance to [facility] facility.

## 2018-06-11 NOTE — PT/OT/SLP PROGRESS
Occupational Therapy      Patient Name:  Lv Crocker   MRN:  [id number]    Patient not seen today secondary to Dialysis. Will follow-up next scheduled OT session.    Leonor Wagner, OT  6/11/2018

## 2018-06-11 NOTE — PROGRESS NOTES
Mr. Crocker will be discharged to rehab today following admission for fever.  PMH includes OHT from 2014, DM, hypothyroidism, anemia, gerd, HL and anxiety.  During admit, patient was given a 7 day course of broad spectrum antibiotics, however, a source of infection was not obvious.  His discharge immunosuppression includes tacrolimus 4mg BID (goal 5-10), prednisone 2.5mg/day and MMF 250mg BID.

## 2018-06-11 NOTE — PROGRESS NOTES
Patient arrived on unit via stretcher. Weight obtained in bed @ 74.4kg. Dialysis tx initiated via left CVC. Ports aspirated and flushed without difficulty. Lines connected and secured. Orders and machine settings verified. Tx started. Good blood flows established. VSS. NAD.

## 2018-06-11 NOTE — PLAN OF CARE
Problem: Patient Care Overview  Goal: Plan of Care Review  Outcome: Outcome(s) achieved Date Met: 06/11/18  Patient's VSS for shift. Patient remains AAOx4, calm, cooperative, and a 1 person assist from bed to chair. Patient is not currently able to walk without physical therapy's assistance. He is to be discharged today to a rehab facility closer to his home in [place], [place]. Patient went to dialysis this morning and had 1.5 liters removed. Patient still incontinent of stool and anuric. Reviewed discharge instructions with patient and patient voiced understanding. Pick-up time is scheduled for 3:30 pm.

## 2018-06-11 NOTE — ASSESSMENT & PLAN NOTE
- Resolved.  -CT scan on admission + for congestion but equivocal for pneumonia. Present of moderate R- pleural effusion.  - Bronch cultures NGTD.   - Blood Cultures and resp panel continues to be negative including RSV.  - completed one week cefepime per ID. Vancomycin given for few days.

## 2018-06-11 NOTE — DISCHARGE SUMMARY
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 She denies him having any SOB, chest pain, diarrhea, nausea, vomiting. He doesnot make any urine since he has been non HD. Fever was noted to be as high as 101.6.She also notes that he has been coughing since being in ER but not before that.No SOB with PT at rehab.No dizziness or syncope. Wife reports he got an extra HD on Thursday because nephrology wanted to remove extra fluid.He has had a good appetite and tolerating PO and per wife has been working with PT and getting stronger. He has finished his abx course from last visit already.    * No surgery found *     Hospital Course: 5/29  - on vanco and cefepime. ID to see (Dr. Lema)  5/30  - febrile this  Morning. (tmax 102.2)  5/31  - had 1 PRBC for anemia. Bronch tomorrow  6/11  - having diarrhea, CMV pcr weakly positive. Broch/BAL done today  6/2  - diarrhea improving, C- diff test positive for Marielena and negative tox (overall neg per Id).   6/3  - no change in rx. BAL results in progress    6/11  - awaiting placement to rehab. Diarrhea resolved this morning    Patient had ICU stay for FUO and was treated with empiric abx including  vanco and cefepime to become afebrile after 48hrs of rx.  Blood cultures obtained on admission were negative while  A fuHe had a Bronch and BAL with cultures and cytology being negative . He also had UF with 2L fluid removal due to initial admission BNP of >1200. Repeat echo showed mild EF decrease which is consistent to his baseline on review. He also developed significant anemia requiring 1 PRBC with resolution. Boyce was negative for overt bleed or hemolysis leading to conclusion that is was from chronic disease compounded by infection on admission.  Patient had episodes of intermittent diarrhea and studies including c-diff were negative. CMV PCR was weakly positive and repeat test is pending. ID and pharmacy advised no treatment for now until further results. He had foot XR per podiatry to evaluate former healing eschar to the toes which came back negative. He continued to do PT/OT with recommendation to d/c to rehab with HD. We decreased his tacrolimus dose from 5mg BID to 4mg BID per Pharmacy. His goal is 6-9 and rehab will call post tx clinic with results if Tacrolimus level is out of range for adjustment. Recommended tacrolimus level to be taken early morning before the first day's dose    Consults         Status Ordering Provider     Inpatient consult to Cardiology  Once     Provider:  (Not yet assigned)    Completed YVES HOYT     Inpatient consult to Infectious Diseases  Once     Provider:  (Not yet assigned)    Completed SONIA VERA     Inpatient consult to Nephrology  Once     Provider:  (Not yet assigned)    Completed SONIA VERA     Inpatient consult to Physical Medicine Rehab  Once     Provider:  (Not yet assigned)    Completed SHUKRI BLANCHARD     Inpatient consult to Physical Medicine Rehab  Once     Provider:  (Not yet assigned)    Completed JULIA CHRISTINE     Inpatient consult to Podiatry  Once     Provider:  (Not yet assigned)    Completed ERNESTO ALSTON     Inpatient  consult to Pulmonology  Once     Provider:  (Not yet assigned)    Completed GEOVANNA BECK          Significant Diagnostic Studies: Labs:   BMP:   Recent Labs  Lab 06/10/18  0418 06/11/18  0355   * 122*   * 134*   K 4.3 4.8    104   CO2 21* 19*   BUN 12 17   CREATININE 3.2* 4.1*   CALCIUM 8.6* 8.6*   MG 1.9 1.8       Pending Diagnostic Studies:     Procedure Component Value Units Date/Time    Cytology Specimen- Pulmonary Medical Cytology [[id number]] Collected:  06/01/18 1253    Order Status:  Sent Lab Status:  No result     Specimen:  Bronch wash with GMS         Final Active Diagnoses:    Diagnosis Date Noted POA    PRINCIPAL PROBLEM:  Fever [R50.9] 05/29/2018 Yes    Physical debility [R53.81] 12/29/2014 Yes    Heart transplanted [Z94.1]  Not Applicable    Diarrhea of presumed infectious origin [R19.7] 06/01/2018 No    Pressure ulcer, stage 2 [L89.92] 05/31/2018 Yes    Necrotic toes [I96] 05/31/2018 Yes    Pulmonary infiltrates on CXR [R91.8] 05/31/2018 Yes    Depression [F32.9] 04/21/2018 Yes    Gangrene [I96] 04/19/2018 Yes    ESRD (end stage renal disease) [N18.6] 03/14/2018 Yes    Pleural effusion [J90] 12/13/2017 Yes    Type 1 diabetes mellitus with stage 3 chronic kidney disease [E10.22, N18.3] 07/20/2016 Yes    Hypothyroidism due to Hashimoto's thyroiditis [E03.8, E06.3] 07/01/2015 Yes      Problems Resolved During this Admission:    Diagnosis Date Noted Date Resolved POA    Anemia [D64.9] 04/21/2015 06/04/2018 Yes      Discharged Condition: good    Disposition: Skilled Nursing Facility    Follow Up: transplant clinic as scheduled    Patient Instructions:     Diet Cardiac     Diet renal     Activity as tolerated       Medications:  Reconciled Home Medications:      Medication List      CHANGE how you take these medications    tacrolimus 1 MG Cap  Commonly known as:  PROGRAF  Take 4 capsules (4 mg total) by mouth every 12 (twelve) hours.  What changed:  · how much to  take  · how to take this  · when to take this  · additional instructions        CONTINUE taking these medications    albuterol-ipratropium 2.5 mg-0.5 mg/3 mL nebulizer solution  Commonly known as:  DUO-NEB  Take 3 mLs by nebulization every 4 (four) hours as needed for Wheezing. Rescue     ALPRAZolam 0.5 MG tablet  Commonly known as:  XANAX  Take 1 tablet (0.5 mg total) by mouth every 6 (six) hours as needed for Anxiety.     aspirin 81 MG EC tablet  Commonly known as:  ECOTRIN  Take 1 tablet (81 mg total) by mouth once daily.     cetirizine 5 MG tablet  Commonly known as:  ZYRTEC  Take 1 tablet (5 mg total) by mouth once daily.     dronabinol 2.5 MG capsule  Commonly known as:  MARINOL  Take 1 capsule (2.5 mg total) by mouth 2 (two) times daily.     escitalopram oxalate 20 MG tablet  Commonly known as:  LEXAPRO  Take 1 tablet (20 mg total) by mouth once daily.     gabapentin 300 MG capsule  Commonly known as:  NEURONTIN  Take 3 capsules (900 mg total) by mouth 3 (three) times daily.     insulin aspart U-100 100 unit/mL Inpn pen  Commonly known as:  NovoLOG  Inject 4 Units into the skin 3 (three) times daily.     insulin detemir U-100 100 unit/mL (3 mL) Inpn pen  Commonly known as:  LEVEMIR FLEXTOUCH  Inject 8 Units into the skin 2 (two) times daily.     lancets Misc  1 Device by Misc.(Non-Drug; Combo Route) route 4 (four) times daily with meals and nightly.     levalbuterol 0.63 mg/3 mL nebulizer solution  Commonly known as:  XOPENEX  Take 3 mLs (0.63 mg total) by nebulization every 4 (four) hours while awake. Rescue     levothyroxine 137 MCG Tab tablet  Commonly known as:  SYNTHROID  Take 1 tablet (137 mcg total) by mouth before breakfast.     magnesium oxide 400 mg tablet  Commonly known as:  MAG-OX  Take 1 tablet (400 mg total) by mouth once daily.     * midodrine 10 MG tablet  Commonly known as:  PROAMATINE  Take 1 tablet (10 mg total) by mouth 3 (three) times daily with meals.     * midodrine 5 MG Tab  Commonly  "known as:  PROAMATINE  Take 3 tablets (15 mg total) by mouth as needed (30 minutes prior to HD).     mycophenolate 250 mg Cap  Commonly known as:  CELLCEPT  Take 1 capsule (250 mg total) by mouth 2 (two) times daily.     ondansetron 4 MG Tbdl  Commonly known as:  ZOFRAN-ODT  Take 2 tablets (8 mg total) by mouth every 8 (eight) hours as needed (nausea).     oxyCODONE 5 MG immediate release tablet  Commonly known as:  ROXICODONE  Take 1 tablet (5 mg total) by mouth every 6 (six) hours as needed.     pantoprazole 40 MG tablet  Commonly known as:  PROTONIX  TAKE ONE TABLET BY MOUTH EVERY DAY     pen needle, diabetic 32 gauge x 5/32" Ndle  Uses 5 pen needles a day     polyethylene glycol 17 gram Pwpk  Commonly known as:  GLYCOLAX  Take 17 g by mouth 2 (two) times daily.     pravastatin 40 MG tablet  Commonly known as:  PRAVACHOL  Take 1 tablet (40 mg total) by mouth once daily.     predniSONE 2.5 MG tablet  Commonly known as:  DELTASONE  Take 1 tablet (2.5 mg total) by mouth once daily. S/P Heart Transplant 11/18/2014 ICD-10 Z94.1     QUEtiapine 50 MG tablet  Commonly known as:  SEROQUEL  Take 1 tablet (50 mg total) by mouth every evening.        * This list has 2 medication(s) that are the same as other medications prescribed for you. Read the directions carefully, and ask your doctor or other care provider to review them with you.            STOP taking these medications    benzonatate 100 MG capsule  Commonly known as:  TESSALON          Discussed d/c plan with Dr. Lara and pharmacist on service (Ashok Kern)    Josiah Walsh MD  Heart Transplant  [facility]  "

## 2018-06-11 NOTE — PROGRESS NOTES
Dialysis tx completed. 3.5 hours. 1.5 liters removed. Left CVC locked with heparin, capped and secured. Tolerated tx well. Report given to Katerina CHRISTINA.

## 2018-06-11 NOTE — PLAN OF CARE
[facility] Facility Transfer Orders        Admit to:  In patient rehab facility    Diagnoses:   Active Hospital Problems    Diagnosis  POA    *Fever [R50.9]  Yes     Priority: 1 - High    Physical debility [R53.81]  Yes     Priority: 2     Heart transplanted [Z94.1]  Not Applicable     Priority: 4     Diarrhea of presumed infectious origin [R19.7]  No    Pressure ulcer, stage 2 [L89.92]  Yes    Necrotic toes [I96]  Yes    Pulmonary infiltrates on CXR [R91.8]  Yes    Depression [F32.9]  Yes    Gangrene [I96]  Yes    ESRD (end stage renal disease) [N18.6]  Yes    Pleural effusion [J90]  Yes    Type 1 diabetes mellitus with stage 3 chronic kidney disease [E10.22, N18.3]  Yes    Hypothyroidism due to Hashimoto's thyroiditis [E03.8, E06.3]  Yes      Resolved Hospital Problems    Diagnosis Date Resolved POA    Anemia [D64.9] 06/04/2018 Yes     Allergies:   Review of patient's allergies indicates:   Allergen Reactions    No known drug allergies        Code Status: full    Vitals: Routine       Diet: cardiac diet and diabetes friendly    Activity: Activity as tolerated    Nursing Precautions: Pressure ulcer prevention    Bed/Surface: Pressure Redistribution    Consults: PT to evaluate and treat- 7 times a week and OT to evaluate and treat- 7 times a week    Oxygen: room air    Dialysis: Patient is on dialysis. MWF    Labs:   Prograf levels weekly (Goal is 6-9). If level not within the goal call [phone] Level to be drawn in early AM before the morning dose is given.  - other necessary labs to be drawn during HD as needed    Pending Diagnostic Studies:     Procedure Component Value Units Date/Time    Cytology Specimen- Pulmonary Medical Cytology [[id number]] Collected:  06/01/18 1253    Order Status:  Sent Lab Status:  No result     Specimen:  Bronch wash with GMS         Imaging: none    Miscellaneous Care:   Routine Skin for Bedridden Patients:  Apply moisture barrier cream to  all  Wound Care: yes:  Foot Ulcer:  Location: toes bilateraly    Dry Eschar: Pain with betadine twice daily.    Diabetes Care: Diabetes: Check blood sugar. Fingerstick blood sugar AC and HS  Sliding Scale/Hypoglycemia Protocol: For FSG<80, give 1 amp D50 or 1 glucose tablet. For FSG , do nothing. For -200, give 1 unit of novolog in addition to pre-meal insulin. For -250, give 2 units of novolog in addition to pre-meal insulin. For -300, give 3 units of novolog in addition to pre-meal insulin. For 301-350, give 4 units of novolog in addition to pre-meal insulin. For 351-400, give 5 units of novolog in addition to pre-meal insulin. For FSG >400, give 5 units of novolog in addition to pre-meal insulin and please call MD    IV Access:      Medications: Discontinue all previous medication orders, if any. See new list below.  Current Discharge Medication List      CONTINUE these medications which have CHANGED    Details   tacrolimus (PROGRAF) 1 MG Cap Take 4 capsules (4 mg total) by mouth every 12 (twelve) hours.  Qty: 240 capsule, Refills: 11         CONTINUE these medications which have NOT CHANGED    Details   albuterol-ipratropium (DUO-NEB) 2.5 mg-0.5 mg/3 mL nebulizer solution Take 3 mLs by nebulization every 4 (four) hours as needed for Wheezing. Rescue  Qty: 1 Box, Refills: 0      ALPRAZolam (XANAX) 0.5 MG tablet Take 1 tablet (0.5 mg total) by mouth every 6 (six) hours as needed for Anxiety.  Qty: 10 tablet, Refills: 0      aspirin (ECOTRIN) 81 MG EC tablet Take 1 tablet (81 mg total) by mouth once daily.  Qty: 30 tablet, Refills: 11      cetirizine (ZYRTEC) 5 MG tablet Take 1 tablet (5 mg total) by mouth once daily.  Qty: 30 tablet, Refills: 0      escitalopram oxalate (LEXAPRO) 20 MG tablet Take 1 tablet (20 mg total) by mouth once daily.  Qty: 30 tablet, Refills: 11    Associated Diagnoses: Status post heart transplant      gabapentin (NEURONTIN) 300 MG capsule Take 3 capsules (900 mg  total) by mouth 3 (three) times daily.  Qty: 90 capsule, Refills: 1    Associated Diagnoses: Status post heart transplant; Acquired hypothyroidism      insulin aspart U-100 (NOVOLOG) 100 unit/mL InPn pen Inject 4 Units into the skin 3 (three) times daily.  Qty: 3.6 mL, Refills: 11      insulin detemir U-100 (LEVEMIR FLEXTOUCH) 100 unit/mL (3 mL) SubQ InPn pen Inject 8 Units into the skin 2 (two) times daily.  Qty: 2 Box, Refills: 1      levalbuterol (XOPENEX) 0.63 mg/3 mL nebulizer solution Take 3 mLs (0.63 mg total) by nebulization every 4 (four) hours while awake. Rescue  Qty: 4 Box, Refills: 0      levothyroxine (SYNTHROID) 137 MCG Tab tablet Take 1 tablet (137 mcg total) by mouth before breakfast.  Qty: 30 tablet, Refills: 11      magnesium oxide (MAG-OX) 400 mg tablet Take 1 tablet (400 mg total) by mouth once daily.  Qty: 30 tablet, Refills: 11      !! midodrine (PROAMATINE) 10 MG tablet Take 1 tablet (10 mg total) by mouth 3 (three) times daily with meals.  Qty: 90 tablet, Refills: 11      mycophenolate (CELLCEPT) 250 mg Cap Take 1 capsule (250 mg total) by mouth 2 (two) times daily.  Qty: 60 capsule, Refills: 11      ondansetron (ZOFRAN-ODT) 4 MG TbDL Take 2 tablets (8 mg total) by mouth every 8 (eight) hours as needed (nausea).  Qty: 15 tablet, Refills: 0    Associated Diagnoses: Nausea      oxyCODONE (ROXICODONE) 5 MG immediate release tablet Take 1 tablet (5 mg total) by mouth every 6 (six) hours as needed.  Qty: 15 tablet, Refills: 0      pantoprazole (PROTONIX) 40 MG tablet TAKE ONE TABLET BY MOUTH EVERY DAY  Qty: 30 tablet, Refills: 11      pravastatin (PRAVACHOL) 40 MG tablet Take 1 tablet (40 mg total) by mouth once daily.  Qty: 30 tablet, Refills: 0      predniSONE (DELTASONE) 2.5 MG tablet Take 1 tablet (2.5 mg total) by mouth once daily. S/P Heart Transplant 11/18/2014 ICD-10 Z94.1  Qty: 30 tablet, Refills: 11    Associated Diagnoses: Heart transplanted; Essential hypertension; Chronic kidney  "disease (CKD), stage III (moderate); Immunosuppression; Status post heart transplant; Long-term use of immunosuppressant medication      QUEtiapine (SEROQUEL) 50 MG tablet Take 1 tablet (50 mg total) by mouth every evening.  Qty: 30 tablet, Refills: 11      dronabinol (MARINOL) 2.5 MG capsule Take 1 capsule (2.5 mg total) by mouth 2 (two) times daily.  Qty: 30 capsule, Refills: 0      lancets Misc 1 Device by Misc.(Non-Drug; Combo Route) route 4 (four) times daily with meals and nightly.  Qty: 100 each, Refills: 5    Comments: ON HOLD, patient will request fill once discharged from SNF      !! midodrine (PROAMATINE) 5 MG Tab Take 3 tablets (15 mg total) by mouth as needed (30 minutes prior to HD).  Qty: 30 tablet, Refills: 0      pen needle, diabetic 32 gauge x 5/32" Ndle Uses 5 pen needles a day  Qty: 200 each, Refills: 12      polyethylene glycol (GLYCOLAX) 17 gram PwPk Take 17 g by mouth 2 (two) times daily.  Qty: 10 packet, Refills: 0       !! - Potential duplicate medications found. Please discuss with provider.      STOP taking these medications       benzonatate (TESSALON) 100 MG capsule Comments:   Reason for Stopping:             Follow up: transplant clinic a scheduled  "

## 2018-06-11 NOTE — ASSESSMENT & PLAN NOTE
- resolved with well formed stool this morning  -  CMV pcr neg.   - recent c-diff negative  - patient think its related to cellcept but he has been on medication for a long time

## 2018-06-11 NOTE — PT/OT/SLP PROGRESS
Physical Therapy      Patient Name:  Lv Crocker   MRN:  [id number]    Patient not seen today secondary to  (Pt off floor for dialysis in AM. Pt preparing for d/c in PM.). Will follow-up if pt does not d/c as planned.    Petra Lake, PT, DPT   6/11/2018  [phone]

## 2018-06-13 NOTE — PT/OT/SLP DISCHARGE
Physical Therapy Discharge Summary    Name: Lv Crocker  MRN: [id number]   Principal Problem: Fever     Patient Discharged from acute Physical Therapy on 2018.  Please refer to prior PT noted date on 2018 for functional status.     Assessment:     Patient was discharged unexpectedly.  Information required to complete an accurate discharge summary is unknown.  Refer to therapy initial evaluation and last progress note for initial and most recent functional status and goal achievement.  Recommendations made may be found in medical record.    Objective:     GOALS:    Physical Therapy Goals        Problem: Physical Therapy Goal    Goal Priority Disciplines Outcome Goal Variances Interventions   Physical Therapy Goal     PT/OT, PT Ongoing (interventions implemented as appropriate)     Description:  Goals to be met by: 18     Patient will increase functional independence with mobility by performin. Supine to sit with Stand by Assistance. -not met  2. Sit to supine with Stand by Assistance. -not met  3. Rolling to Left and Right with [place]. -not met  4. Sit to stand transfer with Minimum Assistance. -not met  5. Gait  x 20 feet with Minimum Assistance. -not met  6. Ascend/descend 4 stairs with bilateral Handrails CGA. -not met  7. Lower extremity exercise program x20 reps per handout, with independence. -not met                       Reasons for Discontinuation of Therapy Services  Transfer to alternate level of care.      Plan:     Patient Discharged to: Inpatient Rehab.    Corey Hernández, PT  2018

## 2018-06-15 PROBLEM — Z94.1 HEART TRANSPLANT RECIPIENT: Status: ACTIVE | Noted: 2018-01-01

## 2018-06-15 NOTE — ASSESSMENT & PLAN NOTE
-Continue current vent setting.  -Will need volume removal prior to extubation attempts.   -Sputum culture.  -Will get CT chest to further review.

## 2018-06-15 NOTE — ASSESSMENT & PLAN NOTE
-Cultures ordered including sputum Cx.  -Continue Cefepime.  -Will get random Vanc level in am and re dose prn.

## 2018-06-15 NOTE — NURSING
Rounds Report: Attended interdisciplinary rounds this morning with the transplant team including SW, physicians, fellows,  mid-level providers, and transplant coordinators.  Pt admitted with fever, intubated . Pt currently on Antibiotics . Possible d/c in 2 weeks

## 2018-06-15 NOTE — ED PROVIDER NOTES
"Encounter Date: 6/15/2018    SCRIBE #1 NOTE: I, Yuliya Silver, am scribing for, and in the presence of,  Dr. Arizmendi. I have scribed the following portions of the note - the EKG reading. Other sections scribed: HPI, Chart Review.       History     Chief Complaint   Patient presents with    other     Sepsis transfer. Heart transplant pt.      42 y.o. male with medical history of heart transplant 2004 and ESRD on HD M/W/Fr transferred from [facility] with fever and respiratory failure. Patient had a recent admission at [facility] 5/29 - 6/11/18 for PNA treated with Vanco and Cefepime. He was discharged to a long term rehab center where he was noted to have fever (Tmax 103), cough and shortness of breath without sputum production today. Patient with hypotension, systolic pressures in the 70s. Per EMS report the patient required intubation secondary to respiratory failure and fluid overload prior to transfer. He is immunosuppressed on Cellcept, Prograf, and prednisone. 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       The history is provided by the EMS personnel.     Review of patient's allergies indicates:   Allergen Reactions    No known drug allergies      Past Medical History:   Diagnosis Date    Anxiety     Arthritis     Ascites     Thought to be 2/2 heart tpx; liver bx 3/31/17 w/o significant fibrosis    Cardiomyopathy     Depression     Controlled "for the most part" on Lexipro    Encounter for blood transfusion     during transplant    GERD (gastroesophageal reflux disease)     Hashimoto's disease     History of CHF (congestive heart failure)     Previously EF 20% (prior to heart transplant); last EF 60%, PAP 41 as of 12/12/17; throught to be 2/2 genetics & DM1    History of coronary artery disease     H/o Coronary artery disease; resolved since heart transplant 2014    History of myocardial infarction     h/o MI x3 prior to heart transplant    HLD (hyperlipidemia) " 6/11/2015    Hx of psychiatric care     Hypoglycemia unawareness in type 1 diabetes mellitus     Hypothyroid     Initially hyperthyroid 2/2 Hoshimoto's thyroiditis; now on levothyroxine 150 mcg qd    Pleural effusion due to another disorder     Thought to be 2/2 heart tpx; s/p PleuRx catheter placement and removal after 1-2 months    Psychiatric problem     Pulmonary hypertension assoc with unclear multi-factorial mechanisms 12/12/2017    PAP 41 (EF 60%) on ECHO 12/12/17    Syncope 6/30/2015    Type I diabetes mellitus, well controlled     Well controlled; Dx'd @7y/o; on N insulin 20U BID & Humalog 10U w/meals +SSI; Glu 89 11/9/17; A1c 7.2% 4/5/17    Unspecified essential hypertension 05/29/2014    Well controlled; Last /57 on 11/9/17     Past Surgical History:   Procedure Laterality Date    CARDIAC CATHETERIZATION      CARDIAC SURGERY      CHOLECYSTECTOMY      COLONOSCOPY N/A 3/13/2018    Procedure: COLONOSCOPY;  Surgeon: Tim Spencer MD;  Location: [facility] ([address] [place] [address]);  Service: Endoscopy;  Laterality: N/A;    CORONARY ANGIOPLASTY      FOOT SPLIT TRANSFER OF THE POSTERIOR TIBIALIS TENDON PROCEDURE      HEART TRANSPLANT  2014    KNEE SURGERY      PleuRx catheter placement  01/11/2018    Plan for removal after 1-2 months by Dr. James in cardiothoracic surgery    s/p oht  November 2014    stent placemnet       Family History   Problem Relation Age of Onset    Heart disease Mother     Kidney disease Mother     Diabetes Mother     Hypertension Mother     Depression Mother     Kidney disease Father     Diabetes Father     Hypertension Father     Stroke Father     Heart disease Brother     Stroke Brother     Diabetes Brother     Cancer Brother 50        pancreatic    Vision loss Brother     Hypertension Brother     Diabetes Son     Diabetes Sister     Diabetes Maternal Uncle     Diabetes Paternal Aunt     Diabetes Maternal Grandmother     Diabetes Maternal  Grandfather      Social History   Substance Use Topics    Smoking status: Never Smoker    Smokeless tobacco: Never Used    Alcohol use No     Review of Systems   Unable to perform ROS: Acuity of condition       Physical Exam     Initial Vitals   BP Pulse Resp Temp SpO2   06/15/18 0931 06/15/18 0931 06/15/18 0942 -- 06/15/18 0931   (!) 70/38 105 12  100 %      MAP       --                Physical Exam    Constitutional: He appears well-developed and well-nourished.   HENT:   Head: Atraumatic.   Eyes: Conjunctivae and EOM are normal. Pupils are equal, round, and reactive to light.   Neck: No JVD present.   Cardiovascular: Normal rate, regular rhythm, normal heart sounds and intact distal pulses.   Subclavian catheter on the left. No surrounding erythema or edema.  2+ lower extremity edema   Abdominal: Soft. He exhibits distension. Bowel sounds are decreased. There is no tenderness.       Neurological:   Sedated, intubated. Responding to verbal stimuli.   Skin: Skin is warm and dry. Rash noted.   Dry gangrene on distal aspect of pedal right 1st and 3rd digits, left first digit.          ED Course   Procedures  Labs Reviewed   COMPREHENSIVE METABOLIC PANEL - Abnormal; Notable for the following:        Result Value    Sodium 135 (*)     CO2 22 (*)     Glucose 167 (*)     Creatinine 2.9 (*)     Calcium 8.0 (*)     Total Protein 5.2 (*)     Albumin 2.2 (*)     eGFR if  29.1 (*)     eGFR if non  25.2 (*)     All other components within normal limits    Narrative:     Phos add on per NETTIE Orr  12:25  06/15/2018   CMP add on per MD Tong  13:29   06/15/2018    CBC W/ AUTO DIFFERENTIAL - Abnormal; Notable for the following:     RBC 2.80 (*)     Hemoglobin 8.4 (*)     Hematocrit 27.7 (*)     MCV 99 (*)     MCHC 30.3 (*)     RDW 17.2 (*)     Immature Granulocytes 2.0 (*)     Immature Grans (Abs) 0.12 (*)     Mono% 16.1 (*)     All other components within normal limits    Narrative:      Phos add on per NETTIE Orr  12:25  06/15/2018   CMP add on per MD Tong  13:29   06/15/2018    ISTAT PROCEDURE - Abnormal; Notable for the following:     POC PCO2 50.1 (*)     POC PO2 36 (*)     POC HCO3 29.2 (*)     POC SATURATED O2 66 (*)     POC TCO2 31 (*)     All other components within normal limits   CULTURE, BLOOD   CULTURE, BLOOD   CULTURE, RESPIRATORY   LACTIC ACID, PLASMA   CBC W/ AUTO DIFFERENTIAL   COMPREHENSIVE METABOLIC PANEL   TACROLIMUS LEVEL   CBC W/ AUTO DIFFERENTIAL   COMPREHENSIVE METABOLIC PANEL   TACROLIMUS LEVEL   PHOSPHORUS   PHOSPHORUS    Narrative:     Phos add on per NETTIE Orr  12:25  06/15/2018    COMPREHENSIVE METABOLIC PANEL   CBC W/ AUTO DIFFERENTIAL   URINALYSIS, REFLEX TO URINE CULTURE   LACTIC ACID, PLASMA   POCT GLUCOSE MONITORING CONTINUOUS   POCT GLUCOSE MONITORING CONTINUOUS     EKG Readings: (Independently Interpreted)   Rhythm: Sinus Tachycardia. Heart Rate: 106.   T wave inversions to the inferolateral leads.        X-Ray Chest AP Portable   Final Result      As above         Electronically signed by: Heriberto Lee MD   Date:    06/15/2018   Time:    10:13      CT Chest Abdoment Pelvis Without Contrast (XPD)    (Results Pending)        Medical Decision Making:   History:   Old Medical Records: I decided to obtain old medical records.  Old Records Summarized: records from another hospital.       <> Summary of Records: Zosin and Vancomycin given at outside facility. CBC unremarkable, WBC 4.5. CMP significant for BUN of 11, creatinine 2.6, electrolytes within normal limits.        APC / Resident Notes:   43 y/o heart transplant patient presents as transfer with respiratory failure secondary to fluid overload. Patient arrives intubated, sedated, on pressors. Review of hospital transfer paperwork indicates unremarkable lab studies, CXR findings consistent with pulmonary edema and a large right pleural effusion. Heart transplant service was consulted to emergently  evaluate the patient and he will be admitted to CCU. Nephrology consult placed and dialysis access service notified of need for emergent dialysis. I discussed the care of this patient with my supervising MD.        Claudia Attestation:   Scribe #1: I performed the above scribed service and the documentation accurately describes the services I performed. I attest to the accuracy of the note.    Attending Attestation:     Physician Attestation Statement for NP/PA:   I have conducted a face to face encounter with this patient in addition to the NP/PA, due to Medical Complexity    Other NP/PA Attestation Additions:      Medical Decision Making: Emergent evaluation a 42-year-old male transferred from [facility] for evaluation of septic shock.  Patient was febrile and hypotensive on arrival to the ER.  Patient was started on Levophed for persistent hypotension.  He has a history of end-stage renal disease on dialysis and is currently hypervolemic.  Fluids were given sparingly secondary to hypovolemia.  Patient developed respiratory distress and failure, intubated prior to arrival.  X-ray arrival shows ET tube in satisfactory position with bilateral pulmonary edema.    Heart transplant contacted, case discussed.  They evaluated patient at bedside.  He is well known to them.  Patient received vancomycin and Zosyn prior to arrival.  Will repeat labs.    Patient admitted to CCU.  Nephrology and PICC line team consulted.    [facility]  6 Hours Sepsis Perfusion Exam   Provider Attestation    I attest, a sepsis perfusion exam was performed within 6 hours of Septic Shock presentation.  Fluids were not given secondary to severe hypervolemia     Attending Critical Care:   Critical Care Times:   ==============================================================  · Total Critical Care Time - exclusive of procedural time: 55 minutes.  ==============================================================  Critical care  was necessary to treat or prevent imminent or life-threatening deterioration of the following conditions: congestive heart failure, hypotension and sepsis.   Critical care was time spent personally by me on the following activities: examination of patient, review of old charts, ordering lab, x-rays, and/or EKG, discussion with consultants, ordering and performing treatments and interventions and end of life discussions.   Critical Care Condition: potentially life-threatening     Physician Attestation for Scribe:      Comments: I, Dr. Rukhsana Arizmendi, personally performed the services described in this documentation. All medical record entries made by the scribe were at my direction and in my presence.  I have reviewed the chart and agree that the record reflects my personal performance and is accurate and complete. Rukhsana Arizmendi MD.                 Clinical Impression:   The primary encounter diagnosis was Heart transplant recipient. Diagnoses of Respiratory failure, unspecified chronicity, unspecified whether with hypoxia or hypercapnia, CHF (congestive heart failure), and Acute respiratory failure with hypoxia were also pertinent to this visit.      Disposition:   Disposition: Admitted  Condition: Fair                        Samantha Rdz PA-C  06/15/18 1223       Rukhsana Arizmendi MD  06/15/18 0927

## 2018-06-15 NOTE — ASSESSMENT & PLAN NOTE
-S/P OHTX 2004.  -Immunosuppression: 4mg BID (goal 5-10), prednisone 2.5mg/day and MMF 250mg BID.    -Will get echo.

## 2018-06-15 NOTE — ASSESSMENT & PLAN NOTE
- Dry gangrene developed previous admission with pressor use  - Continue with betadine rx as current.

## 2018-06-15 NOTE — SUBJECTIVE & OBJECTIVE
"Past Medical History:   Diagnosis Date    Anxiety     Arthritis     Ascites     Thought to be 2/2 heart tpx; liver bx 3/31/17 w/o significant fibrosis    Cardiomyopathy     Depression     Controlled "for the most part" on Lexipro    Encounter for blood transfusion     during transplant    GERD (gastroesophageal reflux disease)     Hashimoto's disease     History of CHF (congestive heart failure)     Previously EF 20% (prior to heart transplant); last EF 60%, PAP 41 as of 12/12/17; throught to be 2/2 genetics & DM1    History of coronary artery disease     H/o Coronary artery disease; resolved since heart transplant 2014    History of myocardial infarction     h/o MI x3 prior to heart transplant    HLD (hyperlipidemia) 6/11/2015    Hx of psychiatric care     Hypoglycemia unawareness in type 1 diabetes mellitus     Hypothyroid     Initially hyperthyroid 2/2 Hoshimoto's thyroiditis; now on levothyroxine 150 mcg qd    Pleural effusion due to another disorder     Thought to be 2/2 heart tpx; s/p PleuRx catheter placement and removal after 1-2 months    Psychiatric problem     Pulmonary hypertension assoc with unclear multi-factorial mechanisms 12/12/2017    PAP 41 (EF 60%) on ECHO 12/12/17    Syncope 6/30/2015    Type I diabetes mellitus, well controlled     Well controlled; Dx'd @7y/o; on N insulin 20U BID & Humalog 10U w/meals +SSI; Glu 89 11/9/17; A1c 7.2% 4/5/17    Unspecified essential hypertension 05/29/2014    Well controlled; Last /57 on 11/9/17       Past Surgical History:   Procedure Laterality Date    CARDIAC CATHETERIZATION      CARDIAC SURGERY      CHOLECYSTECTOMY      COLONOSCOPY N/A 3/13/2018    Procedure: COLONOSCOPY;  Surgeon: Tim Spencer MD;  Location: [facility] ([address] [place] [address]);  Service: Endoscopy;  Laterality: N/A;    CORONARY ANGIOPLASTY      FOOT SPLIT TRANSFER OF THE POSTERIOR TIBIALIS TENDON PROCEDURE      HEART TRANSPLANT  2014    KNEE SURGERY      PleuRx " catheter placement  01/11/2018    Plan for removal after 1-2 months by Dr. James in cardiothoracic surgery    s/p oht  November 2014    stent placemnet         Review of patient's allergies indicates:   Allergen Reactions    No known drug allergies        Current Facility-Administered Medications   Medication    albuterol-ipratropium 2.5 mg-0.5 mg/3 mL nebulizer solution 3 mL    ALPRAZolam tablet 0.5 mg    aspirin EC tablet 81 mg    ceFEPIme injection 1 g    cetirizine tablet 5 mg    chlorhexidine 0.12 % solution 15 mL    dexmedetomidine (PRECEDEX) 400mcg/100mL 0.9% NaCL infusion    escitalopram oxalate tablet 20 mg    gabapentin capsule 900 mg    [START ON 6/16/2018] levothyroxine tablet 137 mcg    magnesium oxide tablet 400 mg    midodrine tablet 10 mg    midodrine tablet 15 mg    mycophenolate mofetil 200 mg/mL suspension 250 mg    norepinephrine 4 mg in dextrose 5% 250 mL infusion (premix) (titrating)    ondansetron disintegrating tablet 8 mg    pantoprazole EC tablet 40 mg    polyethylene glycol packet 17 g    pravastatin tablet 40 mg    predniSONE tablet 2.5 mg    propofol (DIPRIVAN) 10 mg/mL infusion    QUEtiapine tablet 50 mg    tacrolimus (PROGRAF) 1 mg/mL oral syringe     Current Outpatient Prescriptions   Medication Sig    albuterol-ipratropium (DUO-NEB) 2.5 mg-0.5 mg/3 mL nebulizer solution Take 3 mLs by nebulization every 4 (four) hours as needed for Wheezing. Rescue    ALPRAZolam (XANAX) 0.5 MG tablet Take 1 tablet (0.5 mg total) by mouth every 6 (six) hours as needed for Anxiety.    aspirin (ECOTRIN) 81 MG EC tablet Take 1 tablet (81 mg total) by mouth once daily.    cetirizine (ZYRTEC) 5 MG tablet Take 1 tablet (5 mg total) by mouth once daily.    dronabinol (MARINOL) 2.5 MG capsule Take 1 capsule (2.5 mg total) by mouth 2 (two) times daily.    escitalopram oxalate (LEXAPRO) 20 MG tablet Take 1 tablet (20 mg total) by mouth once daily.    gabapentin (NEURONTIN)  "300 MG capsule Take 3 capsules (900 mg total) by mouth 3 (three) times daily.    insulin aspart U-100 (NOVOLOG) 100 unit/mL InPn pen Inject 4 Units into the skin 3 (three) times daily.    insulin detemir U-100 (LEVEMIR FLEXTOUCH) 100 unit/mL (3 mL) SubQ InPn pen Inject 8 Units into the skin 2 (two) times daily.    lancets Misc 1 Device by Misc.(Non-Drug; Combo Route) route 4 (four) times daily with meals and nightly.    levalbuterol (XOPENEX) 0.63 mg/3 mL nebulizer solution Take 3 mLs (0.63 mg total) by nebulization every 4 (four) hours while awake. Rescue    levothyroxine (SYNTHROID) 137 MCG Tab tablet Take 1 tablet (137 mcg total) by mouth before breakfast.    magnesium oxide (MAG-OX) 400 mg tablet Take 1 tablet (400 mg total) by mouth once daily.    midodrine (PROAMATINE) 10 MG tablet Take 1 tablet (10 mg total) by mouth 3 (three) times daily with meals.    midodrine (PROAMATINE) 5 MG Tab Take 3 tablets (15 mg total) by mouth as needed (30 minutes prior to HD).    mycophenolate (CELLCEPT) 250 mg Cap Take 1 capsule (250 mg total) by mouth 2 (two) times daily.    ondansetron (ZOFRAN-ODT) 4 MG TbDL Take 2 tablets (8 mg total) by mouth every 8 (eight) hours as needed (nausea).    oxyCODONE (ROXICODONE) 5 MG immediate release tablet Take 1 tablet (5 mg total) by mouth every 6 (six) hours as needed.    pantoprazole (PROTONIX) 40 MG tablet TAKE ONE TABLET BY MOUTH EVERY DAY    pen needle, diabetic 32 gauge x 5/32" Ndle Uses 5 pen needles a day    polyethylene glycol (GLYCOLAX) 17 gram PwPk Take 17 g by mouth 2 (two) times daily.    pravastatin (PRAVACHOL) 40 MG tablet Take 1 tablet (40 mg total) by mouth once daily.    predniSONE (DELTASONE) 2.5 MG tablet Take 1 tablet (2.5 mg total) by mouth once daily. S/P Heart Transplant 11/18/2014 ICD-10 Z94.1    QUEtiapine (SEROQUEL) 50 MG tablet Take 1 tablet (50 mg total) by mouth every evening.    tacrolimus (PROGRAF) 1 MG Cap Take 4 capsules (4 mg total) by " mouth every 12 (twelve) hours.     Family History     Problem Relation (Age of Onset)    Cancer Brother (50)    Depression Mother    Diabetes Mother, Father, Brother, Son, Sister, Maternal Uncle, Paternal Aunt, Maternal Grandmother, Maternal Grandfather    Heart disease Mother, Brother    Hypertension Mother, Father, Brother    Kidney disease Mother, Father    Stroke Father, Brother    Vision loss Brother        Social History Main Topics    Smoking status: Never Smoker    Smokeless tobacco: Never Used    Alcohol use No    Drug use: No    Sexual activity: Yes     Partners: Female     Review of Systems   Respiratory: Positive for shortness of breath.    All other systems reviewed and are negative.    Objective:     Vital Signs (Most Recent):  Pulse: 105 (06/15/18 1025)  Resp: 12 (06/15/18 0942)  BP: 104/60 (06/15/18 1025)  SpO2: 100 % (06/15/18 1025) Vital Signs (24h Range):  Temp:  [98.2 °F (36.8 °C)-99 °F (37.2 °C)] 99 °F (37.2 °C)  Pulse:  [102-118] 105  Resp:  [12-18] 12  SpO2:  [99 %-100 %] 100 %  BP: ()/(38-75) 104/60     Patient Vitals for the past 72 hrs (Last 3 readings):   Weight   06/15/18 1034 76.8 kg (169 lb 5 oz)     Body mass index is 26.51 kg/m².    No intake or output data in the 24 hours ending 06/15/18 1145    Physical Exam   Constitutional: He is oriented to person, place, and time. He appears well-developed and well-nourished.   HENT:   Head: Normocephalic.   Eyes: Pupils are equal, round, and reactive to light.   Neck: Normal range of motion. Neck supple. JVD present.   Cardiovascular: Normal rate and regular rhythm.    Murmur heard.  Pulmonary/Chest: Effort normal and breath sounds normal.   Abdominal: Soft. Bowel sounds are normal. He exhibits distension.   Musculoskeletal: Normal range of motion. He exhibits edema.   Neurological: He is alert and oriented to person, place, and time.   Skin: Skin is warm and dry.   Psychiatric: He has a normal mood and affect. His behavior is  normal.   Nursing note and vitals reviewed.    Significant Labs:  CBC:    Recent Labs  Lab 06/09/18  0609 06/10/18  0418 06/11/18  0355   WBC 4.29 3.91 4.89   RBC 3.01* 2.92* 3.08*   HGB 9.0* 8.6* 9.3*   HCT 30.3* 29.3* 30.6*    231 265   * 100* 99*   MCH 29.9 29.5 30.2   MCHC 29.7* 29.4* 30.4*     BNP:  No results for input(s): BNP in the last 168 hours.    Invalid input(s): BNPTRIAGELBLO  CMP:    Recent Labs  Lab 06/09/18  0609 06/10/18  0418 06/11/18  0355    163* 122*   CALCIUM 8.8 8.6* 8.6*   ALBUMIN 2.4* 2.3* 2.3*   PROT 5.6* 5.5* 5.6*    134* 134*   K 4.1 4.3 4.8   CO2 19* 21* 19*    104 104   BUN 7 12 17   CREATININE 2.2* 3.2* 4.1*   ALKPHOS 121 122 123   ALT 8* 7* 7*   AST 15 13 13   BILITOT 0.4 0.4 0.4      Coagulation:   No results for input(s): PT, INR, APTT in the last 168 hours.  LDH:  No results for input(s): LDH in the last 72 hours.  Microbiology:  Microbiology Results (last 7 days)     Procedure Component Value Units Date/Time    Blood culture x two cultures. Draw prior to antibiotics. [[id number]] Collected:  06/15/18 1135    Order Status:  Sent Specimen:  Blood from Midline, Basilic, Left Updated:  06/15/18 1136    Blood culture x two cultures. Draw prior to antibiotics. [[id number]] Collected:  06/15/18 1055    Order Status:  Sent Specimen:  Blood from Peripheral, Forearm, Right Updated:  06/15/18 1102    Culture, Respiratory with Gram Stain [[id number]]     Order Status:  No result Specimen:  Respiratory from Endotracheal Aspirate         I have reviewed all pertinent labs within the past 24 hours.    Diagnostic Results:  I have reviewed and interpreted all pertinent imaging results/findings within the past 24 hours.

## 2018-06-15 NOTE — HPI
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 Recent long hospitalization due to septic shock, RSV who presents from inpatient rehab with fever and acute resp failure.He was recently admitted from rehab for fevers and was treated with course of IV abx and transferred back to rehab after symptoms resolved. Prior to that admit, he had a prolonged hospital stay secondary to FUO and respiratory failure. He was treated with empiric abx and antifungals. He required high doses of vasopressors during that stay and subsequently developed gangrenous toes and severe debility. He was trached and PEGd during that stay(since removed) as well.  He has since been trying to rehab.     His wife states that he developed a fever of 103 yesterday and was transferred from rehab to local ER. He was started on Abx in ER but resp status worsened and he had to be intubated. He self extubated at some point and was re intubated. She also states that he was being transitioned from [facility] HD schedule to Tu,Th,Sat and may have missed a session. He is currently;y lying in bed intubated with family at bedside. He is awake and alert/followuing commands appropriately. He is on Norepi for BP support.

## 2018-06-15 NOTE — CONSULTS
Single lumen 18g x 08cm midline placed to (L) BASILICvein.  Max dwell date 07.13.18, Lot# [id number].  Needle advances into vein under realtime Ultrasound guidance, image recorded and saved.

## 2018-06-15 NOTE — H&P
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 Recent long hospitalization due to septic shock, RSV who presents from inpatient rehab with fever and acute resp failure.He was recently admitted from rehab for fevers and was treated with course of IV abx and transferred back to rehab after symptoms resolved. Prior to that admit, he had a prolonged hospital stay secondary to FUO and respiratory failure. He was treated with empiric abx and antifungals. He required high doses of vasopressors during that stay and subsequently developed gangrenous toes and severe debility. He was trached and PEGd during that stay(since removed) as well.  He has since been trying to rehab.     His wife states that he developed a fever of 103 yesterday and was transferred from rehab to local ER. He was started on Abx in ER but resp status worsened and he had to be intubated. He self extubated at some point and was re intubated. She also states that he was being transitioned from MWF HD schedule to Tu,Th,Sat and may have missed a session. He is currently;y lying in bed intubated with family at bedside. He is awake and alert/followuing commands appropriately. He is on Norepi for BP support.     Past Medical History:   Diagnosis Date    Anxiety     Arthritis     Ascites     Thought to be 2/2 heart tpx; liver bx 3/31/17 w/o significant fibrosis    Cardiomyopathy     Depression     Controlled "for the most part" on Lexipro    Encounter for blood transfusion     during transplant    " GERD (gastroesophageal reflux disease)     Hashimoto's disease     History of CHF (congestive heart failure)     Previously EF 20% (prior to heart transplant); last EF 60%, PAP 41 as of 12/12/17; throught to be 2/2 genetics & DM1    History of coronary artery disease     H/o Coronary artery disease; resolved since heart transplant 2014    History of myocardial infarction     h/o MI x3 prior to heart transplant    HLD (hyperlipidemia) 6/11/2015    Hx of psychiatric care     Hypoglycemia unawareness in type 1 diabetes mellitus     Hypothyroid     Initially hyperthyroid 2/2 Hoshimoto's thyroiditis; now on levothyroxine 150 mcg qd    Pleural effusion due to another disorder     Thought to be 2/2 heart tpx; s/p PleuRx catheter placement and removal after 1-2 months    Psychiatric problem     Pulmonary hypertension assoc with unclear multi-factorial mechanisms 12/12/2017    PAP 41 (EF 60%) on ECHO 12/12/17    Syncope 6/30/2015    Type I diabetes mellitus, well controlled     Well controlled; Dx'd @5y/o; on N insulin 20U BID & Humalog 10U w/meals +SSI; Glu 89 11/9/17; A1c 7.2% 4/5/17    Unspecified essential hypertension 05/29/2014    Well controlled; Last /57 on 11/9/17       Past Surgical History:   Procedure Laterality Date    CARDIAC CATHETERIZATION      CARDIAC SURGERY      CHOLECYSTECTOMY      COLONOSCOPY N/A 3/13/2018    Procedure: COLONOSCOPY;  Surgeon: Tim Spencer MD;  Location: [facility] ([address] [place] [address]);  Service: Endoscopy;  Laterality: N/A;    CORONARY ANGIOPLASTY      FOOT SPLIT TRANSFER OF THE POSTERIOR TIBIALIS TENDON PROCEDURE      HEART TRANSPLANT  2014    KNEE SURGERY      PleuRx catheter placement  01/11/2018    Plan for removal after 1-2 months by Dr. James in cardiothoracic surgery    s/p oht  November 2014    stent placemnet         Review of patient's allergies indicates:   Allergen Reactions    No known drug allergies        Current Facility-Administered  Medications   Medication    albuterol-ipratropium 2.5 mg-0.5 mg/3 mL nebulizer solution 3 mL    ALPRAZolam tablet 0.5 mg    aspirin EC tablet 81 mg    ceFEPIme injection 1 g    cetirizine tablet 5 mg    chlorhexidine 0.12 % solution 15 mL    dexmedetomidine (PRECEDEX) 400mcg/100mL 0.9% NaCL infusion    escitalopram oxalate tablet 20 mg    gabapentin capsule 900 mg    [START ON 6/16/2018] levothyroxine tablet 137 mcg    magnesium oxide tablet 400 mg    midodrine tablet 10 mg    midodrine tablet 15 mg    mycophenolate mofetil 200 mg/mL suspension 250 mg    norepinephrine 4 mg in dextrose 5% 250 mL infusion (premix) (titrating)    ondansetron disintegrating tablet 8 mg    pantoprazole EC tablet 40 mg    polyethylene glycol packet 17 g    pravastatin tablet 40 mg    predniSONE tablet 2.5 mg    propofol (DIPRIVAN) 10 mg/mL infusion    QUEtiapine tablet 50 mg    tacrolimus (PROGRAF) 1 mg/mL oral syringe     Current Outpatient Prescriptions   Medication Sig    albuterol-ipratropium (DUO-NEB) 2.5 mg-0.5 mg/3 mL nebulizer solution Take 3 mLs by nebulization every 4 (four) hours as needed for Wheezing. Rescue    ALPRAZolam (XANAX) 0.5 MG tablet Take 1 tablet (0.5 mg total) by mouth every 6 (six) hours as needed for Anxiety.    aspirin (ECOTRIN) 81 MG EC tablet Take 1 tablet (81 mg total) by mouth once daily.    cetirizine (ZYRTEC) 5 MG tablet Take 1 tablet (5 mg total) by mouth once daily.    dronabinol (MARINOL) 2.5 MG capsule Take 1 capsule (2.5 mg total) by mouth 2 (two) times daily.    escitalopram oxalate (LEXAPRO) 20 MG tablet Take 1 tablet (20 mg total) by mouth once daily.    gabapentin (NEURONTIN) 300 MG capsule Take 3 capsules (900 mg total) by mouth 3 (three) times daily.    insulin aspart U-100 (NOVOLOG) 100 unit/mL InPn pen Inject 4 Units into the skin 3 (three) times daily.    insulin detemir U-100 (LEVEMIR FLEXTOUCH) 100 unit/mL (3 mL) SubQ InPn pen Inject 8 Units into the skin 2  "(two) times daily.    lancets Misc 1 Device by Misc.(Non-Drug; Combo Route) route 4 (four) times daily with meals and nightly.    levalbuterol (XOPENEX) 0.63 mg/3 mL nebulizer solution Take 3 mLs (0.63 mg total) by nebulization every 4 (four) hours while awake. Rescue    levothyroxine (SYNTHROID) 137 MCG Tab tablet Take 1 tablet (137 mcg total) by mouth before breakfast.    magnesium oxide (MAG-OX) 400 mg tablet Take 1 tablet (400 mg total) by mouth once daily.    midodrine (PROAMATINE) 10 MG tablet Take 1 tablet (10 mg total) by mouth 3 (three) times daily with meals.    midodrine (PROAMATINE) 5 MG Tab Take 3 tablets (15 mg total) by mouth as needed (30 minutes prior to HD).    mycophenolate (CELLCEPT) 250 mg Cap Take 1 capsule (250 mg total) by mouth 2 (two) times daily.    ondansetron (ZOFRAN-ODT) 4 MG TbDL Take 2 tablets (8 mg total) by mouth every 8 (eight) hours as needed (nausea).    oxyCODONE (ROXICODONE) 5 MG immediate release tablet Take 1 tablet (5 mg total) by mouth every 6 (six) hours as needed.    pantoprazole (PROTONIX) 40 MG tablet TAKE ONE TABLET BY MOUTH EVERY DAY    pen needle, diabetic 32 gauge x 5/32" Ndle Uses 5 pen needles a day    polyethylene glycol (GLYCOLAX) 17 gram PwPk Take 17 g by mouth 2 (two) times daily.    pravastatin (PRAVACHOL) 40 MG tablet Take 1 tablet (40 mg total) by mouth once daily.    predniSONE (DELTASONE) 2.5 MG tablet Take 1 tablet (2.5 mg total) by mouth once daily. S/P Heart Transplant 11/18/2014 ICD-10 Z94.1    QUEtiapine (SEROQUEL) 50 MG tablet Take 1 tablet (50 mg total) by mouth every evening.    tacrolimus (PROGRAF) 1 MG Cap Take 4 capsules (4 mg total) by mouth every 12 (twelve) hours.     Family History     Problem Relation (Age of Onset)    Cancer Brother (50)    Depression Mother    Diabetes Mother, Father, Brother, Son, Sister, Maternal Uncle, Paternal Aunt, Maternal Grandmother, Maternal Grandfather    Heart disease Mother, Brother    " Hypertension Mother, Father, Brother    Kidney disease Mother, Father    Stroke Father, Brother    Vision loss Brother        Social History Main Topics    Smoking status: Never Smoker    Smokeless tobacco: Never Used    Alcohol use No    Drug use: No    Sexual activity: Yes     Partners: Female     Review of Systems   Respiratory: Positive for shortness of breath.    All other systems reviewed and are negative.    Objective:     Vital Signs (Most Recent):  Pulse: 105 (06/15/18 1025)  Resp: 12 (06/15/18 0942)  BP: 104/60 (06/15/18 1025)  SpO2: 100 % (06/15/18 1025) Vital Signs (24h Range):  Temp:  [98.2 °F (36.8 °C)-99 °F (37.2 °C)] 99 °F (37.2 °C)  Pulse:  [102-118] 105  Resp:  [12-18] 12  SpO2:  [99 %-100 %] 100 %  BP: ()/(38-75) 104/60     Patient Vitals for the past 72 hrs (Last 3 readings):   Weight   06/15/18 1034 76.8 kg (169 lb 5 oz)     Body mass index is 26.51 kg/m².    No intake or output data in the 24 hours ending 06/15/18 1145    Physical Exam   Constitutional: He is oriented to person, place, and time. He appears well-developed and well-nourished.   HENT:   Head: Normocephalic.   Eyes: Pupils are equal, round, and reactive to light.   Neck: Normal range of motion. Neck supple. JVD present.   Cardiovascular: Normal rate and regular rhythm.    Murmur heard.  Pulmonary/Chest: Effort normal and breath sounds normal.   Abdominal: Soft. Bowel sounds are normal. He exhibits distension.   Musculoskeletal: Normal range of motion. He exhibits edema.   Neurological: He is alert and oriented to person, place, and time.   Skin: Skin is warm and dry.   Psychiatric: He has a normal mood and affect. His behavior is normal.   Nursing note and vitals reviewed.    Significant Labs:  CBC:    Recent Labs  Lab 06/09/18  0609 06/10/18  0418 06/11/18  0355   WBC 4.29 3.91 4.89   RBC 3.01* 2.92* 3.08*   HGB 9.0* 8.6* 9.3*   HCT 30.3* 29.3* 30.6*    231 265   * 100* 99*   MCH 29.9 29.5 30.2   MCHC  29.7* 29.4* 30.4*     BNP:  No results for input(s): BNP in the last 168 hours.    Invalid input(s): BNPTRIAGELBLO  CMP:    Recent Labs  Lab 06/09/18  0609 06/10/18  0418 06/11/18  0355    163* 122*   CALCIUM 8.8 8.6* 8.6*   ALBUMIN 2.4* 2.3* 2.3*   PROT 5.6* 5.5* 5.6*    134* 134*   K 4.1 4.3 4.8   CO2 19* 21* 19*    104 104   BUN 7 12 17   CREATININE 2.2* 3.2* 4.1*   ALKPHOS 121 122 123   ALT 8* 7* 7*   AST 15 13 13   BILITOT 0.4 0.4 0.4      Coagulation:   No results for input(s): PT, INR, APTT in the last 168 hours.  LDH:  No results for input(s): LDH in the last 72 hours.  Microbiology:  Microbiology Results (last 7 days)     Procedure Component Value Units Date/Time    Blood culture x two cultures. Draw prior to antibiotics. [[id number]] Collected:  06/15/18 1135    Order Status:  Sent Specimen:  Blood from Midline, Basilic, Left Updated:  06/15/18 1136    Blood culture x two cultures. Draw prior to antibiotics. [[id number]] Collected:  06/15/18 1055    Order Status:  Sent Specimen:  Blood from Peripheral, Forearm, Right Updated:  06/15/18 1102    Culture, Respiratory with Gram Stain [[id number]]     Order Status:  No result Specimen:  Respiratory from Endotracheal Aspirate         I have reviewed all pertinent labs within the past 24 hours.    Diagnostic Results:  I have reviewed and interpreted all pertinent imaging results/findings within the past 24 hours.    Assessment/Plan:     * Acute respiratory failure with hypoxia    -Continue current vent setting.  -Will need volume removal prior to extubation attempts.   -Sputum culture.  -Will get CT chest to further review.         Fever    -Cultures ordered including sputum Cx.  -Continue Cefepime.  -Will get random Vanc level in am and re dose prn.         Heart transplant recipient    -S/P OHTX 2004.  -Immunosuppression: 4mg BID (goal 5-10), prednisone 2.5mg/day and MMF 250mg BID.    -Will get echo.         ESRD (end stage renal disease)     -Will get Neph Cx as he is hypervolemic.  -Permacath in place.         Gangrene    - Dry gangrene developed previous admission with pressor use  - Continue with betadine rx as current.         Pleural effusion    -Repeat CT to review as above.         Type 1 diabetes mellitus with stage 3 chronic kidney disease    -Insulin sliding scale  -continue detemir        Uninterrupted Critical Care/Counseling Time (not including procedures): 45mn    Tim Mao, NP  Heart Transplant  [facility]

## 2018-06-15 NOTE — ED TRIAGE NOTES
"Lv Crocker, a 42 y.o. male presents to the ED transfer from [facility] for decompensation of medical condition.  Pt has had several episodes of pneumonia and then became hypotensive and less responsive so they intubated him at the facility.  Pt arrived intubated and sedated.        No chief complaint on file.    Review of patient's allergies indicates:   Allergen Reactions    No known drug allergies      Past Medical History:   Diagnosis Date    Anxiety     Arthritis     Ascites     Thought to be 2/2 heart tpx; liver bx 3/31/17 w/o significant fibrosis    Cardiomyopathy     Depression     Controlled "for the most part" on Lexipro    Encounter for blood transfusion     during transplant    GERD (gastroesophageal reflux disease)     Hashimoto's disease     History of CHF (congestive heart failure)     Previously EF 20% (prior to heart transplant); last EF 60%, PAP 41 as of 12/12/17; throught to be 2/2 genetics & DM1    History of coronary artery disease     H/o Coronary artery disease; resolved since heart transplant 2014    History of myocardial infarction     h/o MI x3 prior to heart transplant    HLD (hyperlipidemia) 6/11/2015    Hx of psychiatric care     Hypoglycemia unawareness in type 1 diabetes mellitus     Hypothyroid     Initially hyperthyroid 2/2 Hoshimoto's thyroiditis; now on levothyroxine 150 mcg qd    Pleural effusion due to another disorder     Thought to be 2/2 heart tpx; s/p PleuRx catheter placement and removal after 1-2 months    Psychiatric problem     Pulmonary hypertension assoc with unclear multi-factorial mechanisms 12/12/2017    PAP 41 (EF 60%) on ECHO 12/12/17    Syncope 6/30/2015    Type I diabetes mellitus, well controlled     Well controlled; Dx'd @7y/o; on N insulin 20U BID & Humalog 10U w/meals +SSI; Glu 89 11/9/17; A1c 7.2% 4/5/17    Unspecified essential hypertension 05/29/2014    Well controlled; Last /57 on 11/9/17           "

## 2018-06-16 NOTE — ED NOTES
Pt resting in stretcher in NAD. Pt intubated and calm. Family member at bedside. Eyes open spontaneously. Side rails up X2. Bed low and locked. Pt and pt's family updated on POC, waiting to go to CT then room, pt nodded and pt's family verbalized understanding.

## 2018-06-16 NOTE — PROGRESS NOTES
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 Per wife, patient was started on Vancomycin /zosyn/1 L NS bolus for   SBP 60s in which he became very lethargic then subsequently intubated started on Levo prior to transfer to [facility]. In ED, patient tolerated wean FIO2 100 to 45%. CXR interstitial markings and multifocal airspace consolidation in both lungs and right pleural effusion similar to previous. Plan for CT chest and abdomen to further evaluation lung and possible thoracentesis.     Recent Hospitalization    5/29-6/11/18   Admitted for fever initially started on Vanc/cefepime empirically for FUO. Had BAL in which culture and cytology negative. C diff neg. Podiatry evaluated right toe 1-3 and left toe 1-2 healing eschar and no infection. Discharge to [facility].      3/11-5/15/18  Admitted for diarrhea and RSV complicated with respiratory failure requiring intubation/trach/PEG (both Trach/PEG removed 5/10) and CACHORRO on superimposed on CKD stage 4 secondary to prograft toxcity?/ischemic ATN (see last admission consult  3/11-5/18/18-discharged to [facility])     6/15 Nephrology consult for hemodialysis. ESRD MWF via [facility] (since 3/14/2018). Last HD yesterday 6/14 at [facility]. EDW 73.5 kg? Remains anuric.     6/16 on SLED this morning and has tolerated ok overnight         Review of patient's allergies indicates:   Allergen Reactions    No known drug allergies      Current Facility-Administered Medications   Medication Frequency    0.9%  NaCl infusion (CRRT USE ONLY) Continuous    acetaminophen tablet 650 mg Q6H PRN    albuterol-ipratropium 2.5 mg-0.5 mg/3 mL nebulizer solution 3 mL Q4H    aspirin EC tablet 81 mg Daily    ceFEPIme injection 1 g Q8H    chlorhexidine 0.12 % solution 15 mL BID    dexmedetomidine (PRECEDEX) 400mcg/100mL 0.9% NaCL infusion Continuous    dextrose 50% injection 12.5 g PRN    enoxaparin injection 30 mg Daily    [START ON 6/18/2018] epoetin jose miguel injection 7,700 Units Every Mon, Wed, Fri    escitalopram oxalate tablet 20 mg Daily    gabapentin capsule 300 mg TID    glucagon (human recombinant) injection 1 mg PRN    insulin aspart U-100 pen 0-5 Units Q6H PRN    levothyroxine tablet 137 mcg Before breakfast    magnesium oxide tablet 400 mg Daily    magnesium sulfate 2g in water 50mL IVPB (premix) PRN    mycophenolate mofetil 200 mg/mL suspension 250 mg BID    norepinephrine 4 mg in dextrose 5% 250 mL infusion (premix) (titrating) Continuous    ondansetron disintegrating tablet 8 mg Q8H PRN    pantoprazole (PROTONIX) 40 mg in dextrose 5 % 100 mL IVPB Daily    polyethylene glycol packet 17 g BID    pravastatin tablet 40 mg Daily    predniSONE tablet 5 mg Daily    QUEtiapine tablet 50 mg QHS    sodium phosphate 20.01 mmol in dextrose 5 % 250 mL IVPB PRN    sodium phosphate 30 mmol in dextrose 5 % 250 mL IVPB PRN    sodium phosphate 39.99 mmol in dextrose 5 % 250 mL IVPB PRN    [START ON 6/17/2018] tacrolimus (PROGRAF) 1 mg/mL oral syringe Daily    tacrolimus (PROGRAF) 1  mg/mL oral syringe Daily       Objective:     Vital Signs (Most Recent):  Temp: 98.8 °F (37.1 °C) (06/16/18 1100)  Pulse: 101 (06/16/18 1134)  Resp: 18 (06/16/18 1134)  BP: (!) 96/54 (06/16/18 1127)  SpO2: 100 % (06/16/18 1134)  O2 Device (Oxygen Therapy): ventilator (06/16/18 1127) Vital Signs (24h Range):  Temp:  [98.4 °F (36.9 °C)-102.9 °F (39.4 °C)] 98.8 °F (37.1 °C)  Pulse:  [] 101  Resp:  [0-34] 18  SpO2:  [99 %-100 %] 100 %  BP: ()/(35-77) 96/54     Weight: 76.2 kg (167 lb 15.9 oz) (06/16/18 1100)  Body mass index is 26.31 kg/m².  Body surface area is 1.9 meters squared.    I/O last 3 completed shifts:  In: 9432.4 [I.V.:8822.4; NG/GT:160; IV Piggyback:450]  Out: 2940 [Other:2940]    Physical Exam   Eyes: EOM are normal.   Neck: No JVD present.   Cardiovascular: Normal rate and regular rhythm.  Exam reveals no friction rub.    Pulmonary/Chest: Effort normal and breath sounds normal.   Abdominal: Soft. He exhibits no distension.   Musculoskeletal: He exhibits edema.   Skin: Skin is warm.           Assessment/Plan:     ESRD (end stage renal disease)      Hx of CACHORRO on superimposed on CKD stage 4 secondary to prograft toxcity/ischemic ATN during last admission 3/11 now dialysis dependent via [facility] (since 3/14/2018) who is transferred to [facility] via air ambulance from [facility] in [place] for fever 103. Priro to arrival patient was intubated and started on Levo.  -EDW 73.5 kg? Last HD 4 hrs duration yesterday  6/14 but not sure how much volume removed.    -L IJ permacath no overt signs of tunnel or exit site infection.     -SLED started last night, primarily for volume, tolerating well, still a little over 6L positive since admit, continue for another 24hrs and reassess in the am    Anemia of CKD  -stable from compared to previous labs.   -No iron in setting of possible infection.  -Continue epo              Thank you for your consult. I will follow-up with patient. Please contact us if you have any  additional questions.    Heriberto Garcia MD  Nephrology  [facility]    I have reviewed and agree with hx; exam;  assessment and plan  Currently undergoing an uneventful CRRT

## 2018-06-16 NOTE — SUBJECTIVE & OBJECTIVE
Interval History: Patient did well overnight. No acute events.     Continuous Infusions:   sodium chloride 0.9% 200 mL/hr at 06/16/18 0700    dexmedetomidine (PRECEDEX) infusion 0.2 mcg/kg/hr (06/16/18 0700)    norepinephrine bitartrate-D5W 0.1 mcg/kg/min (06/16/18 0700)     Scheduled Meds:   albuterol-ipratropium  3 mL Nebulization Q4H    aspirin  81 mg Oral Daily    ceFEPime (MAXIPIME) IVPB  1 g Intravenous Q8H    chlorhexidine  15 mL Mouth/Throat BID    enoxaparin  30 mg Subcutaneous Daily    [START ON 6/18/2018] epoetin jose miguel (PROCRIT) injection  100 Units/kg Intravenous Every Mon, Wed, Fri    escitalopram oxalate  20 mg Oral Daily    gabapentin  300 mg Oral TID    levothyroxine  137 mcg Oral Before breakfast    magnesium oxide  400 mg Oral Daily    mycophenolate mofetil  250 mg Oral BID    pantoprozole (PROTONIX) 40 mg/100 mL D5W IVPB  40 mg Intravenous Daily    polyethylene glycol  17 g Oral BID    pravastatin  40 mg Oral Daily    predniSONE  5 mg Oral Daily    QUEtiapine  50 mg Oral QHS    tacrolimus  4 mg Per G Tube BID     PRN Meds:acetaminophen, dextrose 50%, glucagon (human recombinant), insulin aspart U-100, magnesium sulfate IVPB, ondansetron, sodium phosphate IVPB, sodium phosphate IVPB, sodium phosphate IVPB    Review of patient's allergies indicates:   Allergen Reactions    No known drug allergies      Objective:     Vital Signs (Most Recent):  Temp: 98.8 °F (37.1 °C) (06/16/18 0700)  Pulse: 98 (06/16/18 0730)  Resp: 15 (06/16/18 0730)  BP: 106/66 (06/16/18 0730)  SpO2: 100 % (06/16/18 0730) Vital Signs (24h Range):  Temp:  [98.4 °F (36.9 °C)-102.9 °F (39.4 °C)] 98.8 °F (37.1 °C)  Pulse:  [] 98  Resp:  [0-34] 15  SpO2:  [99 %-100 %] 100 %  BP: ()/(38-77) 106/66     Patient Vitals for the past 72 hrs (Last 3 readings):   Weight   06/16/18 0300 76.2 kg (167 lb 15.9 oz)   06/15/18 2059 75.5 kg (166 lb 7.2 oz)   06/15/18 1034 76.8 kg (169 lb 5 oz)     Body mass index is  26.31 kg/m².      Intake/Output Summary (Last 24 hours) at 06/16/18 0754  Last data filed at 06/16/18 0700   Gross per 24 hour   Intake          9432.37 ml   Output             2940 ml   Net          6492.37 ml       Hemodynamic Parameters:       Physical Exam   Constitutional: He appears well-developed and well-nourished.   HENT:   Head: Normocephalic.   Eyes: Pupils are equal, round, and reactive to light.   Neck: Neck supple. No JVD present.   Cardiovascular: S1 normal, S2 normal and normal heart sounds.  Exam reveals no friction rub.    No murmur heard.  Pulmonary/Chest: Effort normal. No respiratory distress. He has no wheezes. He has rales.   Abdominal: Soft. Bowel sounds are normal. He exhibits no distension. There is no tenderness.   Neurological: He is alert.       Significant Labs:  CBC:    Recent Labs  Lab 06/11/18  0355 06/15/18  1135 06/16/18  0358   WBC 4.89 5.96 5.19   RBC 3.08* 2.80* 2.66*   HGB 9.3* 8.4* 7.8*   HCT 30.6* 27.7* 26.5*    231 184   MCV 99* 99* 100*   MCH 30.2 30.0 29.3   MCHC 30.4* 30.3* 29.4*     BNP:  No results for input(s): BNP in the last 168 hours.    Invalid input(s): BNPTRIAGELBLO  CMP:    Recent Labs  Lab 06/11/18  0355 06/15/18  1135 06/15/18  2228 06/16/18  0358   * 167* 166* 134*  134*   CALCIUM 8.6* 8.0* 7.5* 8.1*  8.1*   ALBUMIN 2.3* 2.2* 1.8* 2.1*  2.1*   PROT 5.6* 5.2*  --  5.2*   * 135* 137 142  142   K 4.8 3.7 3.6 4.1  4.1   CO2 19* 22* 24 24  24    99 102 105  105   BUN 17 13 16 7  7   CREATININE 4.1* 2.9* 2.9* 1.3  1.3   ALKPHOS 123 129  --  137*   ALT 7* 10  --  6*   AST 13 27  --  14   BILITOT 0.4 0.9  --  1.1*      Coagulation:   No results for input(s): PT, INR, APTT in the last 168 hours.  LDH:  No results for input(s): LDH in the last 72 hours.  Microbiology:  Microbiology Results (last 7 days)     Procedure Component Value Units Date/Time    Culture, Respiratory with Gram Stain [[id number]] Collected:  06/15/18 5087    Order  Status:  Completed Specimen:  Respiratory from Endotracheal Aspirate Updated:  06/16/18 0102     Gram Stain (Respiratory) <10 epithelial cells per low power field.     Gram Stain (Respiratory) Many WBC's     Gram Stain (Respiratory) No organisms seen    Blood culture x two cultures. Draw prior to antibiotics. [[id number]] Collected:  06/15/18 1135    Order Status:  Completed Specimen:  Blood from Midline, Basilic, Left Updated:  06/15/18 1915     Blood Culture, Routine No Growth to date    Narrative:       Aerobic and anaerobic    Blood culture x two cultures. Draw prior to antibiotics. [[id number]] Collected:  06/15/18 1055    Order Status:  Completed Specimen:  Blood from Peripheral, Forearm, Right Updated:  06/15/18 1915     Blood Culture, Routine No Growth to date    Narrative:       Aerobic and anaerobic          I have reviewed all pertinent labs within the past 24 hours.    Estimated Creatinine Clearance: 67.8 mL/min (based on SCr of 1.3 mg/dL).    Diagnostic Results:  I have reviewed and interpreted all pertinent imaging results/findings within the past 24 hours.

## 2018-06-16 NOTE — PROGRESS NOTES
Patient arived from the ED w/out incident and ventilated with ambu bag. He was placed on vent w/setting a/c f16 Vt 400 Peep 5 FiO2 45%. Et tube 7.0cm located 22cm@lip. Will continue to monitor.

## 2018-06-16 NOTE — SUBJECTIVE & OBJECTIVE
Review of patient's allergies indicates:   Allergen Reactions    No known drug allergies      Current Facility-Administered Medications   Medication Frequency    0.9%  NaCl infusion (CRRT USE ONLY) Continuous    acetaminophen tablet 650 mg Q6H PRN    albuterol-ipratropium 2.5 mg-0.5 mg/3 mL nebulizer solution 3 mL Q4H    aspirin EC tablet 81 mg Daily    ceFEPIme injection 1 g Q8H    chlorhexidine 0.12 % solution 15 mL BID    dexmedetomidine (PRECEDEX) 400mcg/100mL 0.9% NaCL infusion Continuous    dextrose 50% injection 12.5 g PRN    enoxaparin injection 30 mg Daily    [START ON 6/18/2018] epoetin jose miguel injection 7,700 Units Every Mon, Wed, Fri    escitalopram oxalate tablet 20 mg Daily    gabapentin capsule 300 mg TID    glucagon (human recombinant) injection 1 mg PRN    insulin aspart U-100 pen 0-5 Units Q6H PRN    levothyroxine tablet 137 mcg Before breakfast    magnesium oxide tablet 400 mg Daily    magnesium sulfate 2g in water 50mL IVPB (premix) PRN    mycophenolate mofetil 200 mg/mL suspension 250 mg BID    norepinephrine 4 mg in dextrose 5% 250 mL infusion (premix) (titrating) Continuous    ondansetron disintegrating tablet 8 mg Q8H PRN    pantoprazole (PROTONIX) 40 mg in dextrose 5 % 100 mL IVPB Daily    polyethylene glycol packet 17 g BID    pravastatin tablet 40 mg Daily    predniSONE tablet 5 mg Daily    QUEtiapine tablet 50 mg QHS    sodium phosphate 20.01 mmol in dextrose 5 % 250 mL IVPB PRN    sodium phosphate 30 mmol in dextrose 5 % 250 mL IVPB PRN    sodium phosphate 39.99 mmol in dextrose 5 % 250 mL IVPB PRN    [START ON 6/17/2018] tacrolimus (PROGRAF) 1 mg/mL oral syringe Daily    tacrolimus (PROGRAF) 1 mg/mL oral syringe Daily       Objective:     Vital Signs (Most Recent):  Temp: 98.8 °F (37.1 °C) (06/16/18 1100)  Pulse: 101 (06/16/18 1134)  Resp: 18 (06/16/18 1134)  BP: (!) 96/54 (06/16/18 1127)  SpO2: 100 % (06/16/18 1134)  O2 Device (Oxygen Therapy):  ventilator (06/16/18 1127) Vital Signs (24h Range):  Temp:  [98.4 °F (36.9 °C)-102.9 °F (39.4 °C)] 98.8 °F (37.1 °C)  Pulse:  [] 101  Resp:  [0-34] 18  SpO2:  [99 %-100 %] 100 %  BP: ()/(35-77) 96/54     Weight: 76.2 kg (167 lb 15.9 oz) (06/16/18 1100)  Body mass index is 26.31 kg/m².  Body surface area is 1.9 meters squared.    I/O last 3 completed shifts:  In: 9432.4 [I.V.:8822.4; NG/GT:160; IV Piggyback:450]  Out: 2940 [Other:2940]    Physical Exam   Eyes: EOM are normal.   Neck: No JVD present.   Cardiovascular: Normal rate and regular rhythm.  Exam reveals no friction rub.    Pulmonary/Chest: Effort normal and breath sounds normal.   Abdominal: Soft. He exhibits no distension.   Musculoskeletal: He exhibits edema.   Skin: Skin is warm.

## 2018-06-16 NOTE — PLAN OF CARE
Problem: Patient Care Overview  Goal: Plan of Care Review  Outcome: Ongoing (interventions implemented as appropriate)  Patient progressing towards discharge.  Patient had no acute events noted throughout the night at this time.  See Doc Flowsheets for documented results.       Infusions: precedex and levophed.     Neurological:  Responds to voice.  Opens eyes on command.  Pupils equal and reactive. Follows commands     Cardiac:  NSR to ST.  Pulses weak, but palpable in all extremities.  Capillary refill < 3 seconds in all extremities.    Respiratory:  intubated     Gastrointestinal: normoactive BS. abd soft/nontender. NGT clamped. 1 BM during shift     Genitourinary: anuric.      Endocrine:  Blood glucose monitoring.     Musculoskeletal: MONZON     Integumentary/Other: Patient repositioned every 2 hours throughout the night. Pt with necrotic toes. Bilateral SCDs and heel protectors on. Sacral foam in place. Bathed and linens changed. CRRT started, currently ongoing without issues, levophed immediately started with initiation of CRRT at net even, eventually, after increasing levophed drip at small dosage ( 0.1 mcg/kg/min), able go reach UFR goal 350, tolerating well. Fall precaution maintained. Max temp 102.9, tylenol administered.    POC: CRRT     Discussed plan of care with patient and family with verbalization of understanding.  Will continue to monitor.

## 2018-06-16 NOTE — CONSULTS
Consult received. Full consult note to follow.   Discussed with primary team . Plan to start SLED for volume removal once patient transfer to ICU. Goal for 2-2.5 L net UF over 12-14 hrs.     Amanda Cuellar NP  Nephrology

## 2018-06-16 NOTE — ASSESSMENT & PLAN NOTE
Hx of CACHORRO on superimposed on CKD stage 4 secondary to prograft toxcity/ischemic ATN during last admission 3/11 now dialysis dependent via [facility] (since 3/14/2018) who [place] [facility] [place] [facility] in [place] for fever 103. Priro to arrival patient was intubated and started on Levo.  -EDW 73.5 kg? Last HD 4 hrs duration yesterday  6/14 but not sure how much volume removed.    -L IJ permacath no overt signs of tunnel or exit site infection.     -SLED started last night, primarily for volume, tolerating well, still a little over 6L positive since admit, continue for another 24hrs and reassess in the am    Anemia of CKD  -stable from compared to previous labs.   -No iron in setting of possible infection.  -Continue epo

## 2018-06-16 NOTE — SUBJECTIVE & OBJECTIVE
"Past Medical History:   Diagnosis Date    Anxiety     Arthritis     Ascites     Thought to be 2/2 heart tpx; liver bx 3/31/17 w/o significant fibrosis    Cardiomyopathy     Depression     Controlled "for the most part" on Lexipro    Encounter for blood transfusion     during transplant    GERD (gastroesophageal reflux disease)     Hashimoto's disease     History of CHF (congestive heart failure)     Previously EF 20% (prior to heart transplant); last EF 60%, PAP 41 as of 12/12/17; throught to be 2/2 genetics & DM1    History of coronary artery disease     H/o Coronary artery disease; resolved since heart transplant 2014    History of myocardial infarction     h/o MI x3 prior to heart transplant    HLD (hyperlipidemia) 6/11/2015    Hx of psychiatric care     Hypoglycemia unawareness in type 1 diabetes mellitus     Hypothyroid     Initially hyperthyroid 2/2 Hoshimoto's thyroiditis; now on levothyroxine 150 mcg qd    Pleural effusion due to another disorder     Thought to be 2/2 heart tpx; s/p PleuRx catheter placement and removal after 1-2 months    Psychiatric problem     Pulmonary hypertension assoc with unclear multi-factorial mechanisms 12/12/2017    PAP 41 (EF 60%) on ECHO 12/12/17    Syncope 6/30/2015    Type I diabetes mellitus, well controlled     Well controlled; Dx'd @7y/o; on N insulin 20U BID & Humalog 10U w/meals +SSI; Glu 89 11/9/17; A1c 7.2% 4/5/17    Unspecified essential hypertension 05/29/2014    Well controlled; Last /57 on 11/9/17       Past Surgical History:   Procedure Laterality Date    CARDIAC CATHETERIZATION      CARDIAC SURGERY      CHOLECYSTECTOMY      COLONOSCOPY N/A 3/13/2018    Procedure: COLONOSCOPY;  Surgeon: Tim Spencer MD;  Location: [facility] ([address] [place] [address]);  Service: Endoscopy;  Laterality: N/A;    CORONARY ANGIOPLASTY      FOOT SPLIT TRANSFER OF THE POSTERIOR TIBIALIS TENDON PROCEDURE      HEART TRANSPLANT  2014    KNEE SURGERY      PleuRx " catheter placement  01/11/2018    Plan for removal after 1-2 months by Dr. James in cardiothoracic surgery    s/p oht  November 2014    stent placemnet         Review of patient's allergies indicates:   Allergen Reactions    No known drug allergies      Current Facility-Administered Medications   Medication Frequency    0.9%  NaCl infusion (CRRT USE ONLY) Continuous    albuterol-ipratropium 2.5 mg-0.5 mg/3 mL nebulizer solution 3 mL Q4H    aspirin EC tablet 81 mg Daily    ceFEPIme injection 1 g Q8H    chlorhexidine 0.12 % solution 15 mL BID    dexmedetomidine (PRECEDEX) 400mcg/100mL 0.9% NaCL infusion Continuous    dextrose 50% injection 12.5 g PRN    escitalopram oxalate tablet 20 mg Daily    gabapentin capsule 300 mg TID    glucagon (human recombinant) injection 1 mg PRN    insulin aspart U-100 pen 0-5 Units Q6H PRN    [START ON 6/16/2018] levothyroxine tablet 137 mcg Before breakfast    magnesium oxide tablet 400 mg Daily    magnesium sulfate 2g in water 50mL IVPB (premix) PRN    mycophenolate mofetil 200 mg/mL suspension 250 mg BID    norepinephrine 4 mg in dextrose 5% 250 mL infusion (premix) (titrating) Continuous    ondansetron disintegrating tablet 8 mg Q8H PRN    [START ON 6/16/2018] pantoprazole (PROTONIX) 40 mg in dextrose 5 % 100 mL IVPB Daily    polyethylene glycol packet 17 g BID    pravastatin tablet 40 mg Daily    [START ON 6/16/2018] predniSONE tablet 5 mg Daily    propofol (DIPRIVAN) 10 mg/mL infusion     QUEtiapine tablet 50 mg QHS    sodium phosphate 20.01 mmol in dextrose 5 % 250 mL IVPB PRN    sodium phosphate 30 mmol in dextrose 5 % 250 mL IVPB PRN    sodium phosphate 39.99 mmol in dextrose 5 % 250 mL IVPB PRN    tacrolimus (PROGRAF) 1 mg/mL oral syringe BID     Family History     Problem Relation (Age of Onset)    Cancer Brother (50)    Depression Mother    Diabetes Mother, Father, Brother, Son, Sister, Maternal Uncle, Paternal Aunt, Maternal Grandmother,  Maternal Grandfather    Heart disease Mother, Brother    Hypertension Mother, Father, Brother    Kidney disease Mother, Father    Stroke Father, Brother    Vision loss Brother        Social History Main Topics    Smoking status: Never Smoker    Smokeless tobacco: Never Used    Alcohol use No    Drug use: No    Sexual activity: Yes     Partners: Female     Review of Systems   Unable to perform ROS: Intubated     Objective:     Vital Signs (Most Recent):  Temp: 98.9 °F (37.2 °C) (06/15/18 1810)  Pulse: 101 (06/15/18 2001)  Resp: 19 (06/15/18 2001)  BP: (!) 91/54 (06/15/18 1916)  SpO2: 100 % (06/15/18 2001)  O2 Device (Oxygen Therapy): ventilator (06/15/18 2001) Vital Signs (24h Range):  Temp:  [98.2 °F (36.8 °C)-99 °F (37.2 °C)] 98.9 °F (37.2 °C)  Pulse:  [101-118] 101  Resp:  [12-19] 19  SpO2:  [99 %-100 %] 100 %  BP: ()/(38-77) 91/54     Weight: 76.8 kg (169 lb 5 oz) (06/15/18 1034)  Body mass index is 26.51 kg/m².  Body surface area is 1.91 meters squared.    I/O last 3 completed shifts:  In: 6944 [I.V.:6744; IV Piggyback:200]  Out: -     Physical Exam   Constitutional: He is oriented to person, place, and time. He appears well-developed.   Ill appearing. Intubated FIO 45%. Follow simple commands   HENT:   Head: Atraumatic.   Eyes: EOM are normal.   Neck: Neck supple.   Cardiovascular: Tachycardia present.    Pulmonary/Chest:   Intubated. Clear anteriorly   Abdominal: Soft. Bowel sounds are normal. He exhibits distension. There is no tenderness.   Ascites.    Musculoskeletal: He exhibits edema (3 +BLE). He exhibits no tenderness.   Neurological: He is alert and oriented to person, place, and time.   Skin: Skin is warm and dry.   -L IJ Permacath site no signs of tunnel or exit site infection.  -Right necrotic toes 1-3-similar to previous admission  -Left great toe eschar-improved compared to previous admission.       Psychiatric: He has a normal mood and affect.       Significant Labs:  All labs within  the past 24 hours have been reviewed.    Significant Imaging:  Labs: Reviewed  Upper GI: Reviewed  done

## 2018-06-16 NOTE — PLAN OF CARE
Problem: Patient Care Overview  Goal: Plan of Care Review  Outcome: Ongoing (interventions implemented as appropriate)  Nutrition assessment completed. Please see RD note for details.    Recommendation/Intervention:   1. As medically able, recommend starting TF.   Isosource 1.5 @ goal rate 55mL/hr.   - Initiate @ 15mL/hr and increase by 10mL q4hrs, or as tolerated, until goal rate is reached.   - Hold for residuals >500mL.   - Provides 1980kcals, 90g protein and 1008mL free water.   - If electrolytes elevate, recommend changing TF formula to Novasource Renal @ goal rate 40mL/hr.     2. If able to extubate and advance diet, recommend Cardiac.     RD to monitor.    Goals: Pt to receive nutrition by RD follow up  Nutrition Goal Status: new  Communication of RD Recs: reviewed with RN

## 2018-06-16 NOTE — ASSESSMENT & PLAN NOTE
Hx of CACHORRO on superimposed on CKD stage 4 secondary to prograft toxcity/ischemic ATN during last admission 3/11 now dialysis dependent via [facility] (since 3/14/2018) who is transferred to [facility] via air ambulance from [facility] in [place] for fever 103. Priro to arrival patient was intubated and started on Levo.  -EDW 73.5 kg? Last HD 4 hrs duration yesterday  6/14 but not sure how much volume removed.    -L IJ permacath no overt signs of tunnel or exit site infection.   -Start SLED for volume management -400 ml as tolerated. Discussed with primary team.     Anemia of CKD  -Hgb 8.4 -stable from compared to previous labs.   -No iron in setting of possible infection.  -Continue epo

## 2018-06-16 NOTE — CONSULTS
"  [facility]  Adult Nutrition  Consult Note    SUMMARY     Recommendations    Recommendation/Intervention:   1. As medically able, recommend starting TF.   Isosource 1.5 @ goal rate 55mL/hr.   - Initiate @ 15mL/hr and increase by 10mL q4hrs, or as tolerated, until goal rate is reached.   - Hold for residuals >500mL.   - Provides 1980kcals, 90g protein and 1008mL free water.   - If electrolytes elevate, recommend changing TF formula to Novasource Renal @ goal rate 40mL/hr.     2. If able to extubate and advance diet, recommend Cardiac.     RD to monitor.    Goals: Pt to receive nutrition by RD follow up  Nutrition Goal Status: new  Communication of RD Recs: reviewed with RN    Reason for Assessment    Reason for Assessment: consult  Diagnosis: other (see comments) (respiratory failure)  Relevant Medical History: s/p OHTx 2014, T1DM, ESRD on HD, PE  Interdisciplinary Rounds: did not attend  General Information Comments: Pt intubated. Family at bedside reports pt previously with PEG/trach which was recently pulled. Noted pt with weight loss x 6 months. HAO NPFE at this time 2/2 intubation however pt would benefit from NPFE at appropriate time s/p extubation.  Nutrition Discharge Planning: unable to determine at this time    Nutrition Risk Screen    Nutrition Risk Screen: other (see comments) (intubated)    Nutrition/Diet History    Typical Food/Fluid Intake: Pt NPO on mech vent.   Do you have any cultural, spiritual, Mosque conflicts, given your current situation?: none  Factors Affecting Nutritional Intake: NPO, on mechanical ventilation    Anthropometrics    Temp: 98.8 °F (37.1 °C)  Height Method: Stated  Height: 5' 7" (170.2 cm)  Height (inches): 67 in  Weight Method: Bed Scale  Weight: 76.2 kg (167 lb 15.9 oz)  Weight (lb): 167.99 lb  Ideal Body Weight (IBW), Male: 148 lb  % Ideal Body Weight, Male (lb): 113.51 lb  BMI (Calculated): 26.4  BMI Grade: 25 - 29.9 - overweight  Weight Loss: other " (see comments) (yes)  Usual Body Weight (UBW), k.1 kg  Weight Change Amount: 27 lb  % Usual Body Weight: 86.67  % Weight Change From Usual Weight: -13.51 %       Lab/Procedures/Meds    Pertinent Labs Reviewed: reviewed  Pertinent Labs Comments: HgbA1c 5.9, Ph 1.9, POCT Glu 102-151  Pertinent Medications Reviewed: reviewed  Pertinent Medications Comments: statin, prednisone, precedex, norepi    Physical Findings/Assessment    Overall Physical Appearance: loss of muscle mass, on ventilator support  Tubes: nasogastric tube  Skin: other (see comments), pressure ulcer(s) (St 2 buttocks)    Estimated/Assessed Needs    Weight Used For Calorie Calculations: 75.5 kg (166 lb 7.2 oz)  Energy Calorie Requirements (kcal):   Energy Need Method: [facility]  Protein Requirements: 91-113g (1.2-1.5g/kg)  Weight Used For Protein Calculations: 75.5 kg (166 lb 7.2 oz)  Fluid Requirements (mL): 1mL/kcal or per MD     RDA Method (mL):   CHO Requirement: 50% of total kcals      Nutrition Prescription Ordered    Current Diet Order: NPO    Evaluation of Received Nutrient/Fluid Intake       % Intake of Estimated Energy Needs: 0 - 25 %  % Meal Intake: NPO    Nutrition Risk    Level of Risk/Frequency of Follow-up:  (f/u 2x/week)     Assessment and Plan    Nutrition Problem  Inadequate energy intake    Related to (etiology):   NPO    Signs and Symptoms (as evidenced by):   Pt receiving <85% EEN and EPN.     Nutrition Diagnosis Status:   New           Monitor and Evaluation    Food and Nutrient Intake: energy intake, food and beverage intake, enteral nutrition intake  Food and Nutrient Adminstration: diet order, enteral and parenteral nutrition administration  Anthropometric Measurements: weight, weight change, body mass index  Biochemical Data, Medical Tests and Procedures: electrolyte and renal panel, gastrointestinal profile, glucose/endocrine profile, inflammatory profile, lipid profile  Nutrition-Focused Physical Findings:  overall appearance     Nutrition Follow-Up    RD Follow-up?: Yes

## 2018-06-16 NOTE — PLAN OF CARE
Problem: Patient Care Overview  Goal: Plan of Care Review  No acute events throughout day, Precedex off. Extubated - nasal cannula. Levophed being weaned. CRRT with . Tolerating ice chips. VS and assessment per flow sheet, patient progressing towards goals as tolerated, plan of care reviewed with Lv Crocker and family, all concerns addressed, will continue to monitor.

## 2018-06-16 NOTE — PROGRESS NOTES
Patient arrived to [facility] 3068/3068 A. Connected to bedside monitor - cardiac monitoring and continuous pulse oximetry applied. Call bell within reach, side rails raised x 2, bed locked and in lowest position. Pt intubated, ETT @ 22 cm at lip to R. Side. precedex infusing. Primary service notified of patient arrival. Patient in no acute distress. Will continue to monitor.

## 2018-06-16 NOTE — HPI
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 Per wife, patient was started on Vancomycin /zosyn/1 L NS bolus for   SBP 60s in which he became very lethargic then subsequently intubated started on Levo prior to transfer to [facility]. In ED, patient tolerated wean FIO2 100 to 45%. CXR interstitial markings and multifocal airspace consolidation in both lungs and right pleural effusion similar to previous. Plan for CT chest and abdomen to further evaluation lung and possible thoracentesis.     Recent Hospitalization    5/29-6/11/18   Admitted for fever initially started on Vanc/cefepime empirically for FUO. Had BAL in which culture and cytology negative. C diff neg. Podiatry evaluated right toe 1-3 and left toe 1-2 healing eschar and no infection. Discharge to [facility].      3/11-5/15/18  Admitted for diarrhea and RSV complicated with respiratory failure requiring intubation/trach/PEG (both Trach/PEG removed 5/10) and CACHORRO on superimposed on CKD stage 4 secondary to prograft toxcity?/ischemic ATN (see last admission consult 3/11-5/18/18-discharged to [facility])     6/15 Nephrology consult for hemodialysis. ESRD MWF via [facility] (since 3/14/2018). Last HD yesterday 6/14 at [facility]. EDW 73.5 kg? Remains anuric.     6/16 on SLED this morning and has tolerated ok overnight

## 2018-06-16 NOTE — PROGRESS NOTES
[facility]  Heart Transplant  Progress Note    Patient Name: Lv Crocker  MRN: [id number]  Admission Date: 6/15/2018  Hospital Length of Stay: 1 days  Attending Physician: Behzad Lara Jr.,*  Primary Care Provider: Philippe Mohr MD  Principal Problem:Acute respiratory failure with hypoxia    Subjective:     Interval History: Patient did well overnight. No acute events.     Continuous Infusions:   sodium chloride 0.9% 200 mL/hr at 06/16/18 0700    dexmedetomidine (PRECEDEX) infusion 0.2 mcg/kg/hr (06/16/18 0700)    norepinephrine bitartrate-D5W 0.1 mcg/kg/min (06/16/18 0700)     Scheduled Meds:   albuterol-ipratropium  3 mL Nebulization Q4H    aspirin  81 mg Oral Daily    ceFEPime (MAXIPIME) IVPB  1 g Intravenous Q8H    chlorhexidine  15 mL Mouth/Throat BID    enoxaparin  30 mg Subcutaneous Daily    [START ON 6/18/2018] epoetin jose miguel (PROCRIT) injection  100 Units/kg Intravenous Every Mon, Wed, Fri    escitalopram oxalate  20 mg Oral Daily    gabapentin  300 mg Oral TID    levothyroxine  137 mcg Oral Before breakfast    magnesium oxide  400 mg Oral Daily    mycophenolate mofetil  250 mg Oral BID    pantoprozole (PROTONIX) 40 mg/100 mL D5W IVPB  40 mg Intravenous Daily    polyethylene glycol  17 g Oral BID    pravastatin  40 mg Oral Daily    predniSONE  5 mg Oral Daily    QUEtiapine  50 mg Oral QHS    tacrolimus  4 mg Per G Tube BID     PRN Meds:acetaminophen, dextrose 50%, glucagon (human recombinant), insulin aspart U-100, magnesium sulfate IVPB, ondansetron, sodium phosphate IVPB, sodium phosphate IVPB, sodium phosphate IVPB    Review of patient's allergies indicates:   Allergen Reactions    No known drug allergies      Objective:     Vital Signs (Most Recent):  Temp: 98.8 °F (37.1 °C) (06/16/18 0700)  Pulse: 98 (06/16/18 0730)  Resp: 15 (06/16/18 0730)  BP: 106/66 (06/16/18 0730)  SpO2: 100 % (06/16/18 0730) Vital Signs (24h Range):  Temp:  [98.4 °F (36.9  °C)-102.9 °F (39.4 °C)] 98.8 °F (37.1 °C)  Pulse:  [] 98  Resp:  [0-34] 15  SpO2:  [99 %-100 %] 100 %  BP: ()/(38-77) 106/66     Patient Vitals for the past 72 hrs (Last 3 readings):   Weight   06/16/18 0300 76.2 kg (167 lb 15.9 oz)   06/15/18 2059 75.5 kg (166 lb 7.2 oz)   06/15/18 1034 76.8 kg (169 lb 5 oz)     Body mass index is 26.31 kg/m².      Intake/Output Summary (Last 24 hours) at 06/16/18 0754  Last data filed at 06/16/18 0700   Gross per 24 hour   Intake          9432.37 ml   Output             2940 ml   Net          6492.37 ml       Hemodynamic Parameters:       Physical Exam   Constitutional: He appears well-developed and well-nourished.   HENT:   Head: Normocephalic.   Eyes: Pupils are equal, round, and reactive to light.   Neck: Neck supple. No JVD present.   Cardiovascular: S1 normal, S2 normal and normal heart sounds.  Exam reveals no friction rub.    No murmur heard.  Pulmonary/Chest: Effort normal. No respiratory distress. He has no wheezes. He has rales.   Abdominal: Soft. Bowel sounds are normal. He exhibits no distension. There is no tenderness.   Neurological: He is alert.       Significant Labs:  CBC:    Recent Labs  Lab 06/11/18  0355 06/15/18  1135 06/16/18  0358   WBC 4.89 5.96 5.19   RBC 3.08* 2.80* 2.66*   HGB 9.3* 8.4* 7.8*   HCT 30.6* 27.7* 26.5*    231 184   MCV 99* 99* 100*   MCH 30.2 30.0 29.3   MCHC 30.4* 30.3* 29.4*     BNP:  No results for input(s): BNP in the last 168 hours.    Invalid input(s): BNPTRIAGELBLO  CMP:    Recent Labs  Lab 06/11/18  0355 06/15/18  1135 06/15/18  2228 06/16/18  0358   * 167* 166* 134*  134*   CALCIUM 8.6* 8.0* 7.5* 8.1*  8.1*   ALBUMIN 2.3* 2.2* 1.8* 2.1*  2.1*   PROT 5.6* 5.2*  --  5.2*   * 135* 137 142  142   K 4.8 3.7 3.6 4.1  4.1   CO2 19* 22* 24 24  24    99 102 105  105   BUN 17 13 16 7  7   CREATININE 4.1* 2.9* 2.9* 1.3  1.3   ALKPHOS 123 129  --  137*   ALT 7* 10  --  6*   AST 13 27  --  14    BILITOT 0.4 0.9  --  1.1*      Coagulation:   No results for input(s): PT, INR, APTT in the last 168 hours.  LDH:  No results for input(s): LDH in the last 72 hours.  Microbiology:  Microbiology Results (last 7 days)     Procedure Component Value Units Date/Time    Culture, Respiratory with Gram Stain [[id number]] Collected:  06/15/18 2327    Order Status:  Completed Specimen:  Respiratory from Endotracheal Aspirate Updated:  06/16/18 0102     Gram Stain (Respiratory) <10 epithelial cells per low power field.     Gram Stain (Respiratory) Many WBC's     Gram Stain (Respiratory) No organisms seen    Blood culture x two cultures. Draw prior to antibiotics. [[id number]] Collected:  06/15/18 1135    Order Status:  Completed Specimen:  Blood from Midline, Basilic, Left Updated:  06/15/18 1915     Blood Culture, Routine No Growth to date    Narrative:       Aerobic and anaerobic    Blood culture x two cultures. Draw prior to antibiotics. [[id number]] Collected:  06/15/18 1055    Order Status:  Completed Specimen:  Blood from Peripheral, Forearm, Right Updated:  06/15/18 1915     Blood Culture, Routine No Growth to date    Narrative:       Aerobic and anaerobic          I have reviewed all pertinent labs within the past 24 hours.    Estimated Creatinine Clearance: 67.8 mL/min (based on SCr of 1.3 mg/dL).    Diagnostic Results:  I have reviewed and interpreted all pertinent imaging results/findings within the past 24 hours.    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 Recent long hospitalization due to septic shock, RSV who presents from inpatient rehab with fever and acute resp failure.He was recently admitted from rehab for fevers and was treated with course of IV abx and transferred back to rehab after symptoms resolved. Prior to that admit, he had a  prolonged hospital stay secondary to FUO and respiratory failure. He was treated with empiric abx and antifungals. He required high doses of vasopressors during that stay and subsequently developed gangrenous toes and severe debility. He was trached and PEGd during that stay(since removed) as well.  He has since been trying to rehab.     His wife states that he developed a fever of 103 yesterday and was transferred from rehab to local ER. He was started on Abx in ER but resp status worsened and he had to be intubated. He self extubated at some point and was re intubated. She also states that he was being transitioned from [facility] HD schedule to Tu,Th,Sat and may have missed a session. He is currently;y lying in bed intubated with family at bedside. He is awake and alert/followuing commands appropriately. He is on Norepi for BP support.     * Acute respiratory failure with hypoxia    -Continue current vent setting.  -Will need volume removal prior to extubation attempts.   -Sputum culture.  -Ct showed chronic R side pleural effusion  -Will extubate patient today  -Speech Therapy for swallow         Heart transplant recipient    -S/P OHTX 2004.  -Immunosuppression: 4mg in the morning and 5 in the evening (goal 5-10), prednisone 2.5mg/day and MMF 250mg BID.    -Will get echo.         Fever    -Cultures ordered including sputum Cx.  -Continue Cefepime.  -Will get random Vanc level in am and re dose prn.         Gangrene    - Dry gangrene developed previous admission with pressor use  - Continue with betadine rx as current.         ESRD (end stage renal disease)    -Continue with CRRT with goal fluid removal 400 ml/hour   -Permacath in place.         Pleural effusion    -Repeat CT to review as above.         Type 1 diabetes mellitus with stage 3 chronic kidney disease    -Insulin sliding scale  -continue detemir            Corey Perry MD  Heart Transplant  [facility]

## 2018-06-16 NOTE — CONSULTS
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 for higher level of care. Per wife, patient was started on Vancomycin /zosyn/1 L NS bolus for   SBP 60s in which he became very lethargic then subsequently intubated started on Levo prior to transfer to [facility]. In ED, patient tolerated wean FIO2 100 to 45%. CXR interstitial markings and multifocal airspace consolidation in both lungs and right pleural effusion similar to previous. Plan for CT chest and abdomen to further evaluation lung and possible thoracentesis.     Recent Hospitalization    5/29-6/11/18   Admitted for fever initially started on Vanc/cefepime empirically for FUO. Had BAL in which culture and cytology negative. C diff neg. Podiatry evaluated right toe 1-3 and left toe 1-2 healing eschar and no infection. Discharge to [facility].      3/11-5/15/18  Admitted for diarrhea and RSV complicated with respiratory failure requiring intubation/trach/PEG (both Trach/PEG removed 5/10) and CACHORRO on superimposed on CKD stage 4 secondary to prograft toxcity?/ischemic ATN (see last admission consult  "3/11-5/18/18-[facility])     Today, 6/15 Nephrology consult for hemodialysis. ESRD MWF via [facility] (since 3/14/2018). [facility] EDW 73.5 kg? Remains anuric.        Past Medical History:   Diagnosis Date    Anxiety     Arthritis     Ascites     Thought to be 2/2 heart tpx; liver bx 3/31/17 w/o significant fibrosis    Cardiomyopathy     Depression     Controlled "for the most part" on Lexipro    Encounter for blood transfusion     during transplant    GERD (gastroesophageal reflux disease)     Hashimoto's disease     History of CHF (congestive heart failure)     Previously EF 20% (prior to heart transplant); last EF 60%, PAP 41 as of 12/12/17; throught to be 2/2 genetics & DM1    History of coronary artery disease     H/o Coronary artery disease; resolved since heart transplant 2014    History of myocardial infarction     h/o MI x3 prior to heart transplant    HLD (hyperlipidemia) 6/11/2015    Hx of psychiatric care     Hypoglycemia unawareness in type 1 diabetes mellitus     Hypothyroid     Initially hyperthyroid 2/2 Hoshimoto's thyroiditis; now on levothyroxine 150 mcg qd    Pleural effusion due to another disorder     Thought to be 2/2 heart tpx; s/p PleuRx catheter placement and removal after 1-2 months    Psychiatric problem     Pulmonary hypertension assoc with unclear multi-factorial mechanisms 12/12/2017    PAP 41 (EF 60%) on ECHO 12/12/17    Syncope 6/30/2015    Type I diabetes mellitus, well controlled     Well controlled; Dx'd @5y/o; on N insulin 20U BID & Humalog 10U w/meals +SSI; Glu 89 11/9/17; A1c 7.2% 4/5/17    Unspecified essential hypertension 05/29/2014    Well controlled; Last /57 on 11/9/17       Past Surgical History:   Procedure Laterality Date    CARDIAC CATHETERIZATION      CARDIAC SURGERY      CHOLECYSTECTOMY      COLONOSCOPY N/A 3/13/2018    Procedure: COLONOSCOPY;  Surgeon: Tim Spencer MD;  Location: " [facility] (2ND FLR);  Service: Endoscopy;  Laterality: N/A;    CORONARY ANGIOPLASTY      FOOT SPLIT TRANSFER OF THE POSTERIOR TIBIALIS TENDON PROCEDURE      HEART TRANSPLANT  2014    KNEE SURGERY      PleuRx catheter placement  01/11/2018    Plan for removal after 1-2 months by Dr. James in cardiothoracic surgery    s/p oht  November 2014    stent placemnet         Review of patient's allergies indicates:   Allergen Reactions    No known drug allergies      Current Facility-Administered Medications   Medication Frequency    0.9%  NaCl infusion (CRRT USE ONLY) Continuous    albuterol-ipratropium 2.5 mg-0.5 mg/3 mL nebulizer solution 3 mL Q4H    aspirin EC tablet 81 mg Daily    ceFEPIme injection 1 g Q8H    chlorhexidine 0.12 % solution 15 mL BID    dexmedetomidine (PRECEDEX) 400mcg/100mL 0.9% NaCL infusion Continuous    dextrose 50% injection 12.5 g PRN    escitalopram oxalate tablet 20 mg Daily    gabapentin capsule 300 mg TID    glucagon (human recombinant) injection 1 mg PRN    insulin aspart U-100 pen 0-5 Units Q6H PRN    [START ON 6/16/2018] levothyroxine tablet 137 mcg Before breakfast    magnesium oxide tablet 400 mg Daily    magnesium sulfate 2g in water 50mL IVPB (premix) PRN    mycophenolate mofetil 200 mg/mL suspension 250 mg BID    norepinephrine 4 mg in dextrose 5% 250 mL infusion (premix) (titrating) Continuous    ondansetron disintegrating tablet 8 mg Q8H PRN    [START ON 6/16/2018] pantoprazole (PROTONIX) 40 mg in dextrose 5 % 100 mL IVPB Daily    polyethylene glycol packet 17 g BID    pravastatin tablet 40 mg Daily    [START ON 6/16/2018] predniSONE tablet 5 mg Daily    propofol (DIPRIVAN) 10 mg/mL infusion     QUEtiapine tablet 50 mg QHS    sodium phosphate 20.01 mmol in dextrose 5 % 250 mL IVPB PRN    sodium phosphate 30 mmol in dextrose 5 % 250 mL IVPB PRN    sodium phosphate 39.99 mmol in dextrose 5 % 250 mL IVPB PRN    tacrolimus (PROGRAF) 1 mg/mL oral  syringe BID     Family History     Problem Relation (Age of Onset)    Cancer Brother (50)    Depression Mother    Diabetes Mother, Father, Brother, Son, Sister, Maternal Uncle, Paternal Aunt, Maternal Grandmother, Maternal Grandfather    Heart disease Mother, Brother    Hypertension Mother, Father, Brother    Kidney disease Mother, Father    Stroke Father, Brother    Vision loss Brother        Social History Main Topics    Smoking status: Never Smoker    Smokeless tobacco: Never Used    Alcohol use No    Drug use: No    Sexual activity: Yes     Partners: Female     Review of Systems   Unable to perform ROS: Intubated     Objective:     Vital Signs (Most Recent):  Temp: 98.9 °F (37.2 °C) (06/15/18 1810)  Pulse: 101 (06/15/18 2001)  Resp: 19 (06/15/18 2001)  BP: (!) 91/54 (06/15/18 1916)  SpO2: 100 % (06/15/18 2001)  O2 Device (Oxygen Therapy): ventilator (06/15/18 2001) Vital Signs (24h Range):  Temp:  [98.2 °F (36.8 °C)-99 °F (37.2 °C)] 98.9 °F (37.2 °C)  Pulse:  [101-118] 101  Resp:  [12-19] 19  SpO2:  [99 %-100 %] 100 %  BP: ()/(38-77) 91/54     Weight: 76.8 kg (169 lb 5 oz) (06/15/18 1034)  Body mass index is 26.51 kg/m².  Body surface area is 1.91 meters squared.    I/O last 3 completed shifts:  In: 6944 [I.V.:6744; IV Piggyback:200]  Out: -     Physical Exam   Constitutional: He is oriented to person, place, and time. He appears well-developed.   Ill appearing. Intubated FIO 45%. Follow simple commands   HENT:   Head: Atraumatic.   Eyes: EOM are normal.   Neck: Neck supple.   Cardiovascular: Tachycardia present.    Pulmonary/Chest:   Intubated. Clear anteriorly   Abdominal: Soft. Bowel sounds are normal. He exhibits distension. There is no tenderness.   Ascites.    Musculoskeletal: He exhibits edema (3 +BLE). He exhibits no tenderness.   Neurological: He is alert and oriented to person, place, and time.   Skin: Skin is warm and dry.   -L IJ Permacath site no signs of tunnel or exit site  infection.  -Right necrotic toes 1-3-similar to previous admission  -Left great toe eschar-improved compared to previous admission.       Psychiatric: He has a normal mood and affect.       Significant Labs:  All labs within the past 24 hours have been reviewed.    Significant Imaging:  Labs: Reviewed  Upper GI: Reviewed  done    Assessment/Plan:     ESRD (end stage renal disease)      Hx of CACHORRO on superimposed on CKD stage 4 secondary to prograft toxcity/ischemic ATN during last admission 3/11 now dialysis dependent via [facility] (since 3/14/2018) who is [place] [facility] [place] from [facility] in [place] for fever 103. Priro to arrival patient was intubated and started on Levo.  -EDW 73.5 kg? Last HD 4 hrs duration yesterday  6/14 but not sure how much volume removed.    -L IJ permacath no overt signs of tunnel or exit site infection.   -Start SLED for volume management -400 ml as tolerated. Discussed with primary team.     Anemia of CKD  -Hgb 8.4 -stable from compared to previous labs.   -No iron in setting of possible infection.  -Continue epo              Thank you for your consult. I will follow-up with patient. Please contact us if you have any additional questions.    Amanda Cuellar NP  Nephrology  [facility]

## 2018-06-16 NOTE — PROGRESS NOTES
2010 patient transported from ED 3 to CT scan and then to bed 3068 via 100% fio2 Ambu bagging with oxygen to ETT. Mask at [facility]. RT Linda transported from CT scan to bed 3068 and report and care of patient turned over to RT Eli.

## 2018-06-16 NOTE — PROGRESS NOTES
Informed pharmacist RN needs to administer prograf, multiple messages sent. Pharmacist states will send now. RN verbalizes understanding.YANELI

## 2018-06-17 NOTE — SUBJECTIVE & OBJECTIVE
Interval History: Patient did well overnight. No acute events. Successfully extubated yesterday.     Continuous Infusions:   sodium chloride 0.9% 200 mL/hr at 06/17/18 0600    dexmedetomidine (PRECEDEX) infusion Stopped (06/16/18 0900)    norepinephrine bitartrate-D5W 0.08 mcg/kg/min (06/17/18 0610)     Scheduled Meds:   albuterol-ipratropium  3 mL Nebulization Q4H    aspirin  81 mg Oral Daily    ceFEPime (MAXIPIME) IVPB  1 g Intravenous Q8H    chlorhexidine  15 mL Mouth/Throat BID    enoxaparin  30 mg Subcutaneous Daily    [START ON 6/18/2018] epoetin jose miguel (PROCRIT) injection  100 Units/kg Intravenous Every Mon, Wed, Fri    escitalopram oxalate  20 mg Oral Daily    gabapentin  300 mg Oral TID    levothyroxine  137 mcg Oral Before breakfast    magnesium oxide  400 mg Oral Daily    mycophenolate mofetil  250 mg Oral BID    pantoprozole (PROTONIX) 40 mg/100 mL D5W IVPB  40 mg Intravenous Daily    polyethylene glycol  17 g Oral BID    pravastatin  40 mg Oral Daily    predniSONE  5 mg Oral Daily    QUEtiapine  50 mg Oral QHS    tacrolimus  4 mg Per G Tube Daily    tacrolimus  5 mg Per G Tube Daily     PRN Meds:acetaminophen, dextrose 50%, glucagon (human recombinant), insulin aspart U-100, magnesium sulfate IVPB, ondansetron, sodium phosphate IVPB, sodium phosphate IVPB, sodium phosphate IVPB    Review of patient's allergies indicates:   Allergen Reactions    No known drug allergies      Objective:     Vital Signs (Most Recent):  Temp: 100.3 °F (37.9 °C) (06/17/18 0301)  Pulse: (!) 129 (06/17/18 0615)  Resp: 20 (06/17/18 0615)  BP: (!) 92/54 (06/17/18 0615)  SpO2: 100 % (06/17/18 0615) Vital Signs (24h Range):  Temp:  [98.1 °F (36.7 °C)-100.3 °F (37.9 °C)] 100.3 °F (37.9 °C)  Pulse:  [] 129  Resp:  [2-28] 20  SpO2:  [86 %-100 %] 100 %  BP: ()/(35-70) 92/54     Patient Vitals for the past 72 hrs (Last 3 readings):   Weight   06/17/18 0300 74.2 kg (163 lb 9.3 oz)   06/16/18 1100 76.2 kg  (167 lb 15.9 oz)   06/16/18 0300 76.2 kg (167 lb 15.9 oz)     Body mass index is 25.62 kg/m².      Intake/Output Summary (Last 24 hours) at 06/17/18 0734  Last data filed at 06/17/18 0610   Gross per 24 hour   Intake          6393.58 ml   Output             8890 ml   Net         -2496.42 ml       Hemodynamic Parameters:       Physical Exam   Constitutional: He appears well-developed and well-nourished.   HENT:   Head: Normocephalic.   Eyes: Pupils are equal, round, and reactive to light.   Neck: Neck supple. No JVD present.   Cardiovascular: S1 normal, S2 normal and normal heart sounds.  Exam reveals no friction rub.    No murmur heard.  Pulmonary/Chest: Effort normal. No respiratory distress. He has no wheezes. He has rales.   Abdominal: Soft. Bowel sounds are normal. He exhibits no distension. There is no tenderness.   Neurological: He is alert.       Significant Labs:  CBC:    Recent Labs  Lab 06/15/18  1135 06/16/18  0358 06/17/18  0403   WBC 5.96 5.19 3.28*   RBC 2.80* 2.66* 2.45*   HGB 8.4* 7.8* 7.2*   HCT 27.7* 26.5* 24.6*    184 148*   MCV 99* 100* 100*   MCH 30.0 29.3 29.4   MCHC 30.3* 29.4* 29.3*     BNP:  No results for input(s): BNP in the last 168 hours.    Invalid input(s): BNPTRIAGELBLO  CMP:    Recent Labs  Lab 06/15/18  1135  06/16/18  0358 06/16/18  1347 06/16/18  2156 06/17/18  0403   *  < > 134*  134* 141* 219* 158*  158*   CALCIUM 8.0*  < > 8.1*  8.1* 8.3* 8.2* 8.2*  8.2*   ALBUMIN 2.2*  < > 2.1*  2.1* 2.1* 2.2* 2.2*  2.2*   PROT 5.2*  --  5.2*  --   --  5.4*   *  < > 142  142 143 140 141  141   K 3.7  < > 4.1  4.1 4.4 4.3 4.3  4.3   CO2 22*  < > 24  24 24 19* 20*  20*   CL 99  < > 105  105 107 105 105  105   BUN 13  < > 7  7 3* 4* 3*  3*   CREATININE 2.9*  < > 1.3  1.3 0.7 0.8 0.6  0.6   ALKPHOS 129  --  137*  --   --  138*   ALT 10  --  6*  --   --  7*   AST 27  --  14  --   --  13   BILITOT 0.9  --  1.1*  --   --  0.7   < > = values in this interval not  displayed.   Coagulation:   No results for input(s): PT, INR, APTT in the last 168 hours.  LDH:  No results for input(s): LDH in the last 72 hours.  Microbiology:  Microbiology Results (last 7 days)     Procedure Component Value Units Date/Time    Blood culture x two cultures. Draw prior to antibiotics. [[id number]] Collected:  06/15/18 1055    Order Status:  Completed Specimen:  Blood from Peripheral, Forearm, Right Updated:  06/16/18 1212     Blood Culture, Routine No Growth to date     Blood Culture, Routine No Growth to date    Narrative:       Aerobic and anaerobic    Blood culture x two cultures. Draw prior to antibiotics. [[id number]] Collected:  06/15/18 1135    Order Status:  Completed Specimen:  Blood from Midline, Basilic, Left Updated:  06/16/18 1212     Blood Culture, Routine No Growth to date     Blood Culture, Routine No Growth to date    Narrative:       Aerobic and anaerobic    Culture, Respiratory with Gram Stain [[id number]] Collected:  06/15/18 2327    Order Status:  Completed Specimen:  Respiratory from Endotracheal Aspirate Updated:  06/16/18 0102     Gram Stain (Respiratory) <10 epithelial cells per low power field.     Gram Stain (Respiratory) Many WBC's     Gram Stain (Respiratory) No organisms seen          I have reviewed all pertinent labs within the past 24 hours.    Estimated Creatinine Clearance: 146.9 mL/min (based on SCr of 0.6 mg/dL).    Diagnostic Results:  I have reviewed and interpreted all pertinent imaging results/findings within the past 24 hours.

## 2018-06-17 NOTE — SUBJECTIVE & OBJECTIVE
Review of patient's allergies indicates:   Allergen Reactions    No known drug allergies      Current Facility-Administered Medications   Medication Frequency    0.9%  NaCl infusion (CRRT USE ONLY) Continuous    acetaminophen tablet 650 mg Q6H PRN    albuterol-ipratropium 2.5 mg-0.5 mg/3 mL nebulizer solution 3 mL Q4H    aspirin EC tablet 81 mg Daily    ceFEPIme injection 1 g Q8H    chlorhexidine 0.12 % solution 15 mL BID    dexmedetomidine (PRECEDEX) 400mcg/100mL 0.9% NaCL infusion Continuous    dextrose 50% injection 12.5 g PRN    enoxaparin injection 30 mg Daily    [START ON 6/18/2018] epoetin jose miguel injection 7,700 Units Every Mon, Wed, Fri    escitalopram oxalate tablet 20 mg Daily    gabapentin capsule 300 mg TID    glucagon (human recombinant) injection 1 mg PRN    insulin aspart U-100 pen 0-5 Units Q6H PRN    levothyroxine tablet 137 mcg Before breakfast    magnesium oxide tablet 400 mg Daily    magnesium sulfate 2g in water 50mL IVPB (premix) PRN    mycophenolate mofetil 200 mg/mL suspension 250 mg BID    norepinephrine 4 mg in dextrose 5% 250 mL infusion (premix) (titrating) Continuous    ondansetron disintegrating tablet 8 mg Q8H PRN    pantoprazole (PROTONIX) 40 mg in dextrose 5 % 100 mL IVPB Daily    polyethylene glycol packet 17 g BID    pravastatin tablet 40 mg Daily    predniSONE tablet 5 mg Daily    QUEtiapine tablet 50 mg QHS    sodium phosphate 20.01 mmol in dextrose 5 % 250 mL IVPB PRN    sodium phosphate 30 mmol in dextrose 5 % 250 mL IVPB PRN    sodium phosphate 39.99 mmol in dextrose 5 % 250 mL IVPB PRN    tacrolimus (PROGRAF) 1 mg/mL oral syringe Daily    tacrolimus (PROGRAF) 1 mg/mL oral syringe Daily       Objective:     Vital Signs (Most Recent):  Temp: 98.7 °F (37.1 °C) (06/17/18 1115)  Pulse: (!) 128 (06/17/18 1115)  Resp: 19 (06/17/18 1115)  BP: (!) 120/55 (06/17/18 1115)  SpO2: 100 % (06/17/18 1115)  O2 Device (Oxygen Therapy): nasal cannula (06/17/18  1115) Vital Signs (24h Range):  Temp:  [98.1 °F (36.7 °C)-100.3 °F (37.9 °C)] 98.7 °F (37.1 °C)  Pulse:  [109-138] 128  Resp:  [2-28] 19  SpO2:  [86 %-100 %] 100 %  BP: ()/(44-67) 120/55     Weight: 74.2 kg (163 lb 9.3 oz) (06/17/18 0300)  Body mass index is 25.62 kg/m².  Body surface area is 1.87 meters squared.    I/O last 3 completed shifts:  In: 9106.1 [P.O.:924; I.V.:7522.1; NG/GT:160; IV Piggyback:500]  Out: 81874 [Other:68936]    Physical Exam   HENT:   Head: Atraumatic.   Neck: No JVD present.   Cardiovascular: Normal rate and regular rhythm.  Exam reveals no friction rub.    Pulmonary/Chest: Effort normal. He has rales.   Abdominal: Soft. He exhibits no distension.   Musculoskeletal: He exhibits edema.   Skin: Skin is warm.

## 2018-06-17 NOTE — ASSESSMENT & PLAN NOTE
41 y/o M h/o DM1, Hashimoto's thyroiditis, and ICM (s/p OHT 11/18/2014, CMV D+/R+, steroid induction, on maintenance tacro/MMF/pred; c/b early hemorrhagic tamponade requiring washout, delayed closure, and pericardial window and subsequent AF w/RVR, and CACHORRO; late course c/b ABMR 4/2015 s/p rituxan, PLEX, and IVIG; subsequent C.diff/CMV reactivation; restrictive cardiomyopathy with recurrent pleural effusions s/p VATS/pleurex catheter 1/2018 and ascites requiring repeated LVP; and a prolonged admission in 3/2018 due to RSV-A multifocal pneumonia with superimposed bacterial pneumonia c/b prolonged hypoxic RF s/p trach/PEG now removed, progression of CKD to ESRD on iHD, and DIC) who was admitted on 5/29/2018 with acute onset fevers, hypoxia, and multifocal patchy pulmonary opacities due to presumptive bacterial pneumonia (CAP vs HCAP). improved on empiric vanc/cefepime. Course c/b CMV asymptomatic reactivation that resolved, now readmitted from rehab with fevers, cough, AMS and hypoxia requiring intubation transferred here for further care.  Pt was febrile here to 102, was extubated and now with empiric cefepime fever curve trending down.  CT chest appears to have fluid, consolidative changes and evidence of likely aspiration  - for now would continue empiric cefepime for pneumonia - likely aspiration  - discussed with team, will follow

## 2018-06-17 NOTE — PLAN OF CARE
Problem: Patient Care Overview  Goal: Plan of Care Review  No acute events throughout day, remains on Levophed. Palliative care consulted. Ativan for anxiety. CRRT tolerated. VS and assessment per flow sheet, patient progressing towards goals as tolerated, plan of care reviewed with Lv Crocker and family, all concerns addressed, will continue to monitor.

## 2018-06-17 NOTE — PLAN OF CARE
Problem: Patient Care Overview  Goal: Plan of Care Review  Outcome: Ongoing (interventions implemented as appropriate)  Patient progressing towards discharge.  Patient had no acute events noted throughout the night at this time.  See Doc Flowsheets for documented results.       Infusions: levophed     Neurological:  Responds to voice.  Opens eyes on command.  Pupils equal and reactive.     Cardiac:  ST.  Pulses weak, but palpable in all extremities.  Capillary refill < 3 seconds in all extremities.    Respiratory: 2L 02  NC, tolerating well, 02 sat > 92%     Gastrointestinal: nausea and vomit x1 zofran administered. Normoactive BS in all quads. 3 BM during shift.     Genitourinary: anuric     Endocrine:  Blood glucose monitoring.     Musculoskeletal: MONZON     Integumentary/Other: Patient repositioned every 2 hours throughout the night. Bilateral SCDs and heel protectors on.  Tolerating CRRT . Unable to wean levophed drip.      Discussed plan of care with patient and family with verbalization of understanding.  Will continue to monitor.

## 2018-06-17 NOTE — NURSING
Pt refusing meds including rejection meds - discussed with spouse - states they spoke with Dr Pate about Palliative care. Dr Lara notified pt refusing meds. Will speak with pt/spouse later today.

## 2018-06-17 NOTE — CONSULTS
[facility]  Infectious Disease  Consult Note    Patient Name: Lv Crocker  MRN: [id number]  Admission Date: 6/15/2018  Hospital Length of Stay: 2 days  Attending Physician: Behzad Lara Jr.,*  Primary Care Provider: Philippe Mohr MD     Isolation Status: No active isolations        Inpatient consult to Infectious Diseases  Consult performed by: SAMMY OLVERA  Consult ordered by: PONCHO CARLSON  Reason for consult: fevers  Assessment/Recommendations: Consult received, full note to follow, on cefepime, discussed with team

## 2018-06-17 NOTE — ASSESSMENT & PLAN NOTE
Hx of CACHORRO on superimposed on CKD stage 4 secondary to prograft toxcity/ischemic ATN during last admission 3/11 now dialysis dependent via [facility] (since 3/14/2018) who is transferred to [facility] via air ambulance from [facility] in [place] for fever 103. Priro to arrival patient was intubated and started on Levo.  -EDW 73.5 kg? Last HD 4 hrs duration yesterday  6/14 but not sure how much volume removed.    -L IJ permacath no overt signs of tunnel or exit site infection.     -making excellent progress with volume removal, net negative by close to 3L past 24hrs, still net positive since admission    -no need for continuous clearance, will change SLED to SCUF and reassess volume and clearance status in the morning      Anemia of CKD  -stable from compared to previous labs.   -No iron in setting of possible infection.  -Continue epo

## 2018-06-17 NOTE — PROGRESS NOTES
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 Per wife, patient was started on Vancomycin /zosyn/1 L NS bolus for   SBP 60s in which he became very lethargic then subsequently intubated started on Levo prior to transfer to [facility]. In ED, patient tolerated wean FIO2 100 to 45%. CXR interstitial markings and multifocal airspace consolidation in both lungs and right pleural effusion similar to previous. Plan for CT chest and abdomen to further evaluation lung and possible thoracentesis.     Recent Hospitalization    5/29-6/11/18   Admitted for fever initially started on Vanc/cefepime empirically for FUO. Had BAL in which culture and cytology negative. C diff neg. Podiatry evaluated right toe 1-3 and left toe 1-2 healing eschar and no infection. Discharge to [facility].      3/11-5/15/18  Admitted for diarrhea and RSV complicated with respiratory failure requiring intubation/trach/PEG (both Trach/PEG removed 5/10) and CACHORRO on superimposed on CKD stage 4 secondary to prograft toxcity?/ischemic ATN (see last admission consult  3/11-5/18/18-discharged to [facility])     6/15 Nephrology consult for hemodialysis. ESRD MWF via [facility] (since 3/14/2018). Last HD yesterday 6/14 at [facility]. EDW 73.5 kg? Remains anuric.     6/16 on SLED this morning and has tolerated ok overnight         Review of patient's allergies indicates:   Allergen Reactions    No known drug allergies      Current Facility-Administered Medications   Medication Frequency    0.9%  NaCl infusion (CRRT USE ONLY) Continuous    acetaminophen tablet 650 mg Q6H PRN    albuterol-ipratropium 2.5 mg-0.5 mg/3 mL nebulizer solution 3 mL Q4H    aspirin EC tablet 81 mg Daily    ceFEPIme injection 1 g Q8H    chlorhexidine 0.12 % solution 15 mL BID    dexmedetomidine (PRECEDEX) 400mcg/100mL 0.9% NaCL infusion Continuous    dextrose 50% injection 12.5 g PRN    enoxaparin injection 30 mg Daily    [START ON 6/18/2018] epoetin jose miguel injection 7,700 Units Every Mon, Wed, Fri    escitalopram oxalate tablet 20 mg Daily    gabapentin capsule 300 mg TID    glucagon (human recombinant) injection 1 mg PRN    insulin aspart U-100 pen 0-5 Units Q6H PRN    levothyroxine tablet 137 mcg Before breakfast    magnesium oxide tablet 400 mg Daily    magnesium sulfate 2g in water 50mL IVPB (premix) PRN    mycophenolate mofetil 200 mg/mL suspension 250 mg BID    norepinephrine 4 mg in dextrose 5% 250 mL infusion (premix) (titrating) Continuous    ondansetron disintegrating tablet 8 mg Q8H PRN    pantoprazole (PROTONIX) 40 mg in dextrose 5 % 100 mL IVPB Daily    polyethylene glycol packet 17 g BID    pravastatin tablet 40 mg Daily    predniSONE tablet 5 mg Daily    QUEtiapine tablet 50 mg QHS    sodium phosphate 20.01 mmol in dextrose 5 % 250 mL IVPB PRN    sodium phosphate 30 mmol in dextrose 5 % 250 mL IVPB PRN    sodium phosphate 39.99 mmol in dextrose 5 % 250 mL IVPB PRN    tacrolimus (PROGRAF) 1 mg/mL oral syringe Daily    tacrolimus (PROGRAF) 1 mg/mL oral syringe  Daily       Objective:     Vital Signs (Most Recent):  Temp: 98.7 °F (37.1 °C) (06/17/18 1115)  Pulse: (!) 128 (06/17/18 1115)  Resp: 19 (06/17/18 1115)  BP: (!) 120/55 (06/17/18 1115)  SpO2: 100 % (06/17/18 1115)  O2 Device (Oxygen Therapy): nasal cannula (06/17/18 1115) Vital Signs (24h Range):  Temp:  [98.1 °F (36.7 °C)-100.3 °F (37.9 °C)] 98.7 °F (37.1 °C)  Pulse:  [109-138] 128  Resp:  [2-28] 19  SpO2:  [86 %-100 %] 100 %  BP: ()/(44-67) 120/55     Weight: 74.2 kg (163 lb 9.3 oz) (06/17/18 0300)  Body mass index is 25.62 kg/m².  Body surface area is 1.87 meters squared.    I/O last 3 completed shifts:  In: 9106.1 [P.O.:924; I.V.:7522.1; NG/GT:160; IV Piggyback:500]  Out: 59592 [Other:05132]    Physical Exam   HENT:   Head: Atraumatic.   Neck: No JVD present.   Cardiovascular: Normal rate and regular rhythm.  Exam reveals no friction rub.    Pulmonary/Chest: Effort normal. He has rales.   Abdominal: Soft. He exhibits no distension.   Musculoskeletal: He exhibits edema.   Skin: Skin is warm.           Assessment/Plan:     ESRD (end stage renal disease)      Hx of CACHORRO on superimposed on CKD stage 4 secondary to prograft toxcity/ischemic ATN during last admission 3/11 now dialysis dependent via [facility] (since 3/14/2018) who is transferred to [facility] via air ambulance from [facility] in [place] for fever 103. Priro to arrival patient was intubated and started on Levo.  -EDW 73.5 kg? Last HD 4 hrs duration yesterday  6/14 but not sure how much volume removed.    -L IJ permacath no overt signs of tunnel or exit site infection.     -making excellent progress with volume removal, net negative by close to 3L past 24hrs, still net positive since admission    -no need for continuous clearance, will change SLED to SCUF and reassess volume and clearance status in the morning      Anemia of CKD  -stable from compared to previous labs.   -No iron in setting of possible infection.  -Continue epo              Thank you for  your consult. I will follow-up with patient. Please contact us if you have any additional questions.    Heriberto Garcia MD  Nephrology  [facility]

## 2018-06-17 NOTE — PROGRESS NOTES
CRRT NOTES    Daily checks done. Machine pressures within acceptable ranges. Negative chlorine test on RO machine. Lines visible and secured.

## 2018-06-17 NOTE — PROGRESS NOTES
[facility]  Heart Transplant  Progress Note    Patient Name: Lv Crocker  MRN: [id number]  Admission Date: 6/15/2018  Hospital Length of Stay: 2 days  Attending Physician: Behzad Lara Jr.,*  Primary Care Provider: Philippe Mohr MD  Principal Problem:Acute respiratory failure with hypoxia    Subjective:     Interval History: Patient did well overnight. No acute events. Successfully extubated yesterday.     Continuous Infusions:   sodium chloride 0.9% 200 mL/hr at 06/17/18 0600    dexmedetomidine (PRECEDEX) infusion Stopped (06/16/18 0900)    norepinephrine bitartrate-D5W 0.08 mcg/kg/min (06/17/18 0610)     Scheduled Meds:   albuterol-ipratropium  3 mL Nebulization Q4H    aspirin  81 mg Oral Daily    ceFEPime (MAXIPIME) IVPB  1 g Intravenous Q8H    chlorhexidine  15 mL Mouth/Throat BID    enoxaparin  30 mg Subcutaneous Daily    [START ON 6/18/2018] epoetin jose miguel (PROCRIT) injection  100 Units/kg Intravenous Every Mon, Wed, Fri    escitalopram oxalate  20 mg Oral Daily    gabapentin  300 mg Oral TID    levothyroxine  137 mcg Oral Before breakfast    magnesium oxide  400 mg Oral Daily    mycophenolate mofetil  250 mg Oral BID    pantoprozole (PROTONIX) 40 mg/100 mL D5W IVPB  40 mg Intravenous Daily    polyethylene glycol  17 g Oral BID    pravastatin  40 mg Oral Daily    predniSONE  5 mg Oral Daily    QUEtiapine  50 mg Oral QHS    tacrolimus  4 mg Per G Tube Daily    tacrolimus  5 mg Per G Tube Daily     PRN Meds:acetaminophen, dextrose 50%, glucagon (human recombinant), insulin aspart U-100, magnesium sulfate IVPB, ondansetron, sodium phosphate IVPB, sodium phosphate IVPB, sodium phosphate IVPB    Review of patient's allergies indicates:   Allergen Reactions    No known drug allergies      Objective:     Vital Signs (Most Recent):  Temp: 100.3 °F (37.9 °C) (06/17/18 0301)  Pulse: (!) 129 (06/17/18 0615)  Resp: 20 (06/17/18 0615)  BP: (!) 92/54 (06/17/18  0615)  SpO2: 100 % (06/17/18 0615) Vital Signs (24h Range):  Temp:  [98.1 °F (36.7 °C)-100.3 °F (37.9 °C)] 100.3 °F (37.9 °C)  Pulse:  [] 129  Resp:  [2-28] 20  SpO2:  [86 %-100 %] 100 %  BP: ()/(35-70) 92/54     Patient Vitals for the past 72 hrs (Last 3 readings):   Weight   06/17/18 0300 74.2 kg (163 lb 9.3 oz)   06/16/18 1100 76.2 kg (167 lb 15.9 oz)   06/16/18 0300 76.2 kg (167 lb 15.9 oz)     Body mass index is 25.62 kg/m².      Intake/Output Summary (Last 24 hours) at 06/17/18 0734  Last data filed at 06/17/18 0610   Gross per 24 hour   Intake          6393.58 ml   Output             8890 ml   Net         -2496.42 ml       Hemodynamic Parameters:       Physical Exam   Constitutional: He appears well-developed and well-nourished.   HENT:   Head: Normocephalic.   Eyes: Pupils are equal, round, and reactive to light.   Neck: Neck supple. No JVD present.   Cardiovascular: S1 normal, S2 normal and normal heart sounds.  Exam reveals no friction rub.    No murmur heard.  Pulmonary/Chest: Effort normal. No respiratory distress. He has no wheezes. He has rales.   Abdominal: Soft. Bowel sounds are normal. He exhibits no distension. There is no tenderness.   Neurological: He is alert.       Significant Labs:  CBC:    Recent Labs  Lab 06/15/18  1135 06/16/18  0358 06/17/18  0403   WBC 5.96 5.19 3.28*   RBC 2.80* 2.66* 2.45*   HGB 8.4* 7.8* 7.2*   HCT 27.7* 26.5* 24.6*    184 148*   MCV 99* 100* 100*   MCH 30.0 29.3 29.4   MCHC 30.3* 29.4* 29.3*     BNP:  No results for input(s): BNP in the last 168 hours.    Invalid input(s): BNPTRIAGELBLO  CMP:    Recent Labs  Lab 06/15/18  1135  06/16/18  0358 06/16/18  1347 06/16/18  2156 06/17/18  0403   *  < > 134*  134* 141* 219* 158*  158*   CALCIUM 8.0*  < > 8.1*  8.1* 8.3* 8.2* 8.2*  8.2*   ALBUMIN 2.2*  < > 2.1*  2.1* 2.1* 2.2* 2.2*  2.2*   PROT 5.2*  --  5.2*  --   --  5.4*   *  < > 142  142 143 140 141  141   K 3.7  < > 4.1  4.1 4.4  4.3 4.3  4.3   CO2 22*  < > 24  24 24 19* 20*  20*   CL 99  < > 105  105 107 105 105  105   BUN 13  < > 7  7 3* 4* 3*  3*   CREATININE 2.9*  < > 1.3  1.3 0.7 0.8 0.6  0.6   ALKPHOS 129  --  137*  --   --  138*   ALT 10  --  6*  --   --  7*   AST 27  --  14  --   --  13   BILITOT 0.9  --  1.1*  --   --  0.7   < > = values in this interval not displayed.   Coagulation:   No results for input(s): PT, INR, APTT in the last 168 hours.  LDH:  No results for input(s): LDH in the last 72 hours.  Microbiology:  Microbiology Results (last 7 days)     Procedure Component Value Units Date/Time    Blood culture x two cultures. Draw prior to antibiotics. [[id number]] Collected:  06/15/18 1055    Order Status:  Completed Specimen:  Blood from Peripheral, Forearm, Right Updated:  06/16/18 1212     Blood Culture, Routine No Growth to date     Blood Culture, Routine No Growth to date    Narrative:       Aerobic and anaerobic    Blood culture x two cultures. Draw prior to antibiotics. [[id number]] Collected:  06/15/18 1135    Order Status:  Completed Specimen:  Blood from Midline, Basilic, Left Updated:  06/16/18 1212     Blood Culture, Routine No Growth to date     Blood Culture, Routine No Growth to date    Narrative:       Aerobic and anaerobic    Culture, Respiratory with Gram Stain [[id number]] Collected:  06/15/18 2047    Order Status:  Completed Specimen:  Respiratory from Endotracheal Aspirate Updated:  06/16/18 0102     Gram Stain (Respiratory) <10 epithelial cells per low power field.     Gram Stain (Respiratory) Many WBC's     Gram Stain (Respiratory) No organisms seen          I have reviewed all pertinent labs within the past 24 hours.    Estimated Creatinine Clearance: 146.9 mL/min (based on SCr of 0.6 mg/dL).    Diagnostic Results:  I have reviewed and interpreted all pertinent imaging results/findings within the past 24 hours.    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 Recent long hospitalization due to septic shock, RSV who presents from inpatient rehab with fever and acute resp failure.He was recently admitted from rehab for fevers and was treated with course of IV abx and transferred back to rehab after symptoms resolved. Prior to that admit, he had a prolonged hospital stay secondary to FUO and respiratory failure. He was treated with empiric abx and antifungals. He required high doses of vasopressors during that stay and subsequently developed gangrenous toes and severe debility. He was trached and PEGd during that stay(since removed) as well.  He has since been trying to rehab.     His wife states that he developed a fever of 103 yesterday and was transferred from rehab to local ER. He was started on Abx in ER but resp status worsened and he had to be intubated. He self extubated at some point and was re intubated. She also states that he was being transitioned from [facility] HD schedule to Tu,Th,Sat and may have missed a session. He is currently;y lying in bed intubated with family at bedside. He is awake and alert/followuing commands appropriately. He is on Norepi for BP support.     * Acute respiratory failure with hypoxia    -Successfully extubated yesterday  -On 2L NC   -Sputum culture.        Heart transplant recipient    -S/P OHTX 2004.  -Immunosuppression: 45/ 5 mg  (goal 5-10), prednisone 2.5mg/day and MMF 150mg BID.    -Patient refused prograf yesterday evening and today morning; will discuss the goals of care with him and have palliative consult  -Echo unchanged from prior         Fever    -Cultures : blood, sputum and urine are all negative   -Continue Cefepime.  -Appreciate ID recs   -He needs to be tapped for chronic R pleural effusion and sample to be sent to cultures.         Gangrene    - Dry gangrene developed previous admission  with pressor use  - Continue with betadine rx as current.         ESRD (end stage renal disease)    -Doing well on CRRT with net balance negative 2.5L        Pleural effusion    -Repeat CT to review as above.         Type 1 diabetes mellitus with stage 3 chronic kidney disease    -Insulin sliding scale  -continue detemir            Corey Perry MD  Heart Transplant  [facility]

## 2018-06-17 NOTE — CONSULTS
[facility]  Infectious Disease  Consult Note    Patient Name: Lv Crocker  MRN: [id number]  Admission Date: 6/15/2018  Hospital Length of Stay: 2 days  Attending Physician: Behzad Lara Jr.,*  Primary Care Provider: Philippe Mohr MD     Isolation Status: No active isolations    Patient information was obtained from patient, spouse/SO and ER records.      Consults  Assessment/Plan:     Fever    43 y/o M h/o DM1, Hashimoto's thyroiditis, and ICM (s/p OHT 11/18/2014, CMV D+/R+, steroid induction, on maintenance tacro/MMF/pred; c/b early hemorrhagic tamponade requiring washout, delayed closure, and pericardial window and subsequent AF w/RVR, and CACHORRO; late course c/b ABMR 4/2015 s/p rituxan, PLEX, and IVIG; subsequent C.diff/CMV reactivation; restrictive cardiomyopathy with recurrent pleural effusions s/p VATS/pleurex catheter 1/2018 and ascites requiring repeated LVP; and a prolonged admission in 3/2018 due to RSV-A multifocal pneumonia with superimposed bacterial pneumonia c/b prolonged hypoxic RF s/p trach/PEG now removed, progression of CKD to ESRD on iHD, and DIC) who was admitted on 5/29/2018 with acute onset fevers, hypoxia, and multifocal patchy pulmonary opacities due to presumptive bacterial pneumonia (CAP vs HCAP). improved on empiric vanc/cefepime. Course c/b CMV asymptomatic reactivation that resolved, now readmitted from rehab with fevers, cough, AMS and hypoxia requiring intubation transferred here for further care.  Pt was febrile here to 102, was extubated and now with empiric cefepime fever curve trending down.  CT chest appears to have fluid, consolidative changes and evidence of likely aspiration  - for now would continue empiric cefepime for pneumonia - likely aspiration  - discussed with team, will follow            Thank you for your consult. I will follow-up with patient. Please contact us if you have any additional questions.    López Steward MD  Infectious  "Disease  [facility]    Subjective:     Principal Problem: Acute respiratory failure with hypoxia    HPI: No notes on file    Past Medical History:   Diagnosis Date    Anxiety     Arthritis     Ascites     Thought to be 2/2 heart tpx; liver bx 3/31/17 w/o significant fibrosis    Cardiomyopathy     Depression     Controlled "for the most part" on Lexipro    Encounter for blood transfusion     during transplant    GERD (gastroesophageal reflux disease)     Hashimoto's disease     History of CHF (congestive heart failure)     Previously EF 20% (prior to heart transplant); last EF 60%, PAP 41 as of 12/12/17; throught to be 2/2 genetics & DM1    History of coronary artery disease     H/o Coronary artery disease; resolved since heart transplant 2014    History of myocardial infarction     h/o MI x3 prior to heart transplant    HLD (hyperlipidemia) 6/11/2015    Hx of psychiatric care     Hypoglycemia unawareness in type 1 diabetes mellitus     Hypothyroid     Initially hyperthyroid 2/2 Hoshimoto's thyroiditis; now on levothyroxine 150 mcg qd    Pleural effusion due to another disorder     Thought to be 2/2 heart tpx; s/p PleuRx catheter placement and removal after 1-2 months    Psychiatric problem     Pulmonary hypertension assoc with unclear multi-factorial mechanisms 12/12/2017    PAP 41 (EF 60%) on ECHO 12/12/17    Syncope 6/30/2015    Type I diabetes mellitus, well controlled     Well controlled; Dx'd @7y/o; on N insulin 20U BID & Humalog 10U w/meals +SSI; Glu 89 11/9/17; A1c 7.2% 4/5/17    Unspecified essential hypertension 05/29/2014    Well controlled; Last /57 on 11/9/17       Past Surgical History:   Procedure Laterality Date    CARDIAC CATHETERIZATION      CARDIAC SURGERY      CHOLECYSTECTOMY      COLONOSCOPY N/A 3/13/2018    Procedure: COLONOSCOPY;  Surgeon: Tim Spencer MD;  Location: [facility] ([address] [place] [address]);  Service: Endoscopy;  Laterality: N/A;    CORONARY " ANGIOPLASTY      FOOT SPLIT TRANSFER OF THE POSTERIOR TIBIALIS TENDON PROCEDURE      HEART TRANSPLANT  2014    KNEE SURGERY      PleuRx catheter placement  01/11/2018    Plan for removal after 1-2 months by Dr. James in cardiothoracic surgery    s/p oht  November 2014    stent placemnet         Review of patient's allergies indicates:   Allergen Reactions    No known drug allergies        Medications:  Prescriptions Prior to Admission   Medication Sig    albuterol-ipratropium (DUO-NEB) 2.5 mg-0.5 mg/3 mL nebulizer solution Take 3 mLs by nebulization every 4 (four) hours as needed for Wheezing. Rescue    ALPRAZolam (XANAX) 0.5 MG tablet Take 1 tablet (0.5 mg total) by mouth every 6 (six) hours as needed for Anxiety.    aspirin (ECOTRIN) 81 MG EC tablet Take 1 tablet (81 mg total) by mouth once daily.    cetirizine (ZYRTEC) 5 MG tablet Take 1 tablet (5 mg total) by mouth once daily.    dronabinol (MARINOL) 2.5 MG capsule Take 1 capsule (2.5 mg total) by mouth 2 (two) times daily.    escitalopram oxalate (LEXAPRO) 20 MG tablet Take 1 tablet (20 mg total) by mouth once daily.    gabapentin (NEURONTIN) 300 MG capsule Take 3 capsules (900 mg total) by mouth 3 (three) times daily.    mycophenolate (CELLCEPT) 250 mg Cap Take 1 capsule (250 mg total) by mouth 2 (two) times daily.    QUEtiapine (SEROQUEL) 50 MG tablet Take 1 tablet (50 mg total) by mouth every evening.    tacrolimus (PROGRAF) 1 MG Cap Take 4 capsules (4 mg total) by mouth every 12 (twelve) hours.    insulin aspart U-100 (NOVOLOG) 100 unit/mL InPn pen Inject 4 Units into the skin 3 (three) times daily.    insulin detemir U-100 (LEVEMIR FLEXTOUCH) 100 unit/mL (3 mL) SubQ InPn pen Inject 8 Units into the skin 2 (two) times daily.    lancets Misc 1 Device by Misc.(Non-Drug; Combo Route) route 4 (four) times daily with meals and nightly.    levalbuterol (XOPENEX) 0.63 mg/3 mL nebulizer solution Take 3 mLs (0.63 mg total) by nebulization  "every 4 (four) hours while awake. Rescue    levothyroxine (SYNTHROID) 137 MCG Tab tablet Take 1 tablet (137 mcg total) by mouth before breakfast.    magnesium oxide (MAG-OX) 400 mg tablet Take 1 tablet (400 mg total) by mouth once daily.    midodrine (PROAMATINE) 10 MG tablet Take 1 tablet (10 mg total) by mouth 3 (three) times daily with meals.    midodrine (PROAMATINE) 5 MG Tab Take 3 tablets (15 mg total) by mouth as needed (30 minutes prior to HD).    ondansetron (ZOFRAN-ODT) 4 MG TbDL Take 2 tablets (8 mg total) by mouth every 8 (eight) hours as needed (nausea).    oxyCODONE (ROXICODONE) 5 MG immediate release tablet Take 1 tablet (5 mg total) by mouth every 6 (six) hours as needed.    pantoprazole (PROTONIX) 40 MG tablet TAKE ONE TABLET BY MOUTH EVERY DAY    pen needle, diabetic 32 gauge x 5/32" Ndle Uses 5 pen needles a day    polyethylene glycol (GLYCOLAX) 17 gram PwPk Take 17 g by mouth 2 (two) times daily.    pravastatin (PRAVACHOL) 40 MG tablet Take 1 tablet (40 mg total) by mouth once daily.    predniSONE (DELTASONE) 2.5 MG tablet Take 1 tablet (2.5 mg total) by mouth once daily. S/P Heart Transplant 11/18/2014 ICD-10 Z94.1     Antibiotics     Start     Stop Route Frequency Ordered    06/15/18 1100  ceFEPIme injection 1 g      -- IV Every 8 hours (non-standard times) 06/15/18 0959        Antifungals     None        Antivirals     None           Immunization History   Administered Date(s) Administered    Hepatitis A / Hepatitis B 12/07/2012, 02/28/2017, 08/24/2017, 10/09/2017    Influenza - High Dose 11/10/2015, 10/02/2017    Influenza - Quadrivalent - PF 12/07/2012    Influenza - Trivalent - PF (ADULT) 12/07/2012    Influenza A (H1N1) 2009 Monovalent - IM - PF 11/12/2009    PPD Test 03/23/2018, 05/10/2018    Pneumococcal Conjugate - 13 Valent 10/02/2017    Pneumococcal Polysaccharide - 23 Valent 12/07/2012    Tdap 12/07/2012    Zoster 12/07/2012    influenza - Quadrivalent " 11/18/2016       Family History     Problem Relation (Age of Onset)    Cancer Brother (50)    Depression Mother    Diabetes Mother, Father, Brother, Son, Sister, Maternal Uncle, Paternal Aunt, Maternal Grandmother, Maternal Grandfather    Heart disease Mother, Brother    Hypertension Mother, Father, Brother    Kidney disease Mother, Father    Stroke Father, Brother    Vision loss Brother        Social History     Social History    Marital status:      Spouse name: N/A    Number of children: N/A    Years of education: N/A     Social History Main Topics    Smoking status: Never Smoker    Smokeless tobacco: Never Used    Alcohol use No    Drug use: No    Sexual activity: Yes     Partners: Female     Other Topics Concern    None     Social History Narrative            Breakfast: Skips 80%; cereal; Diet Sprite    Lunch: Turkey or chicken sandwich on white bread; Diet Sprite    Dinner: Grilled burgers, small amount chips; Diet Sprite    Snacks: Ronny crackers before bed on most nights    Eats out: 2x/wk    Water: 0    PA: Walks 15 minutes (strenuous) daily    Goal weight 170-175 lbs by 1/2018     Review of Systems   Constitutional: Positive for fatigue and fever. Negative for activity change, appetite change and chills.   HENT: Negative for congestion, dental problem, mouth sores and sinus pressure.    Eyes: Negative for pain, redness and visual disturbance.   Respiratory: Positive for cough and shortness of breath. Negative for wheezing.    Cardiovascular: Negative for chest pain and leg swelling.   Gastrointestinal: Negative for abdominal distention, abdominal pain, diarrhea, nausea and vomiting.   Endocrine: Negative for polyuria.   Genitourinary: Negative for decreased urine volume, dysuria and frequency.   Musculoskeletal: Negative for joint swelling.   Skin: Negative for color change.   Allergic/Immunologic: Negative for food allergies.   Neurological: Negative for dizziness, weakness and  headaches.   Hematological: Negative for adenopathy.   Psychiatric/Behavioral: Negative for agitation and confusion. The patient is not nervous/anxious.      Objective:     Vital Signs (Most Recent):  Temp: 98.9 °F (37.2 °C) (06/17/18 0700)  Pulse: (!) 130 (06/17/18 0829)  Resp: (!) 23 (06/17/18 0829)  BP: (!) 90/46 (06/17/18 0800)  SpO2: 100 % (06/17/18 0829) Vital Signs (24h Range):  Temp:  [98.1 °F (36.7 °C)-100.3 °F (37.9 °C)] 98.9 °F (37.2 °C)  Pulse:  [] 130  Resp:  [2-28] 23  SpO2:  [86 %-100 %] 100 %  BP: ()/(35-62) 90/46     Weight: 74.2 kg (163 lb 9.3 oz)  Body mass index is 25.62 kg/m².    Estimated Creatinine Clearance: 146.9 mL/min (based on SCr of 0.6 mg/dL).    Physical Exam   Constitutional: He is oriented to person, place, and time. He appears well-developed and well-nourished.   HENT:   Head: Normocephalic and atraumatic.   Mouth/Throat: Oropharynx is clear and moist.   Eyes: Conjunctivae are normal.   Neck: Neck supple.   Cardiovascular: Normal rate, regular rhythm and normal heart sounds.    No murmur heard.  Pulmonary/Chest: Effort normal. No respiratory distress. He has wheezes.   Abdominal: Soft. Bowel sounds are normal. He exhibits no distension. There is no tenderness.   Musculoskeletal: Normal range of motion. He exhibits no edema or tenderness.   Lymphadenopathy:     He has no cervical adenopathy.   Neurological: He is alert and oriented to person, place, and time. Coordination normal.   Skin: Skin is warm and dry. No rash noted.   Psychiatric: He has a normal mood and affect. His behavior is normal.       Significant Labs:   CBC:   Recent Labs  Lab 06/15/18  1135 06/16/18  0358 06/17/18  0403   WBC 5.96 5.19 3.28*   HGB 8.4* 7.8* 7.2*   HCT 27.7* 26.5* 24.6*    184 148*     CMP:   Recent Labs  Lab 06/15/18  1135  06/16/18  0358 06/16/18  1347 06/16/18  2156 06/17/18  0403   *  < > 142  142 143 140 141  141   K 3.7  < > 4.1  4.1 4.4 4.3 4.3  4.3   CL 99  < >  105  105 107 105 105  105   CO2 22*  < > 24  24 24 19* 20*  20*   *  < > 134*  134* 141* 219* 158*  158*   BUN 13  < > 7  7 3* 4* 3*  3*   CREATININE 2.9*  < > 1.3  1.3 0.7 0.8 0.6  0.6   CALCIUM 8.0*  < > 8.1*  8.1* 8.3* 8.2* 8.2*  8.2*   PROT 5.2*  --  5.2*  --   --  5.4*   ALBUMIN 2.2*  < > 2.1*  2.1* 2.1* 2.2* 2.2*  2.2*   BILITOT 0.9  --  1.1*  --   --  0.7   ALKPHOS 129  --  137*  --   --  138*   AST 27  --  14  --   --  13   ALT 10  --  6*  --   --  7*   ANIONGAP 14  < > 13  13 12 16 16  16   EGFRNONAA 25.2*  < > >60.0  >60.0 >60.0 >60.0 >60.0  >60.0   < > = values in this interval not displayed.    Significant Imaging: I have reviewed all pertinent imaging results/findings within the past 24 hours.   6/15 CT chest  Large right and small left pleural effusions.  Worsening bilateral airspace disease, most pronounced in the aerated left lung, suggestive of pulmonary edema.  Pneumonia or aspiration would also be considered in the correct clinical setting.    Rounded right lower lobe opacity, presumably round atelectasis, unchanged when compared with the prior chest CT.    Moderate volume ascites and diffuse mesenteric/body wall edema.    Moderate volume fecal loading in the colon which may reflect constipation.    Postoperative changes of orthotopic heart transplantation.    6/15 sputum cx NGTD  6/15 bcx NGTD

## 2018-06-17 NOTE — SUBJECTIVE & OBJECTIVE
"Past Medical History:   Diagnosis Date    Anxiety     Arthritis     Ascites     Thought to be 2/2 heart tpx; liver bx 3/31/17 w/o significant fibrosis    Cardiomyopathy     Depression     Controlled "for the most part" on Lexipro    Encounter for blood transfusion     during transplant    GERD (gastroesophageal reflux disease)     Hashimoto's disease     History of CHF (congestive heart failure)     Previously EF 20% (prior to heart transplant); last EF 60%, PAP 41 as of 12/12/17; throught to be 2/2 genetics & DM1    History of coronary artery disease     H/o Coronary artery disease; resolved since heart transplant 2014    History of myocardial infarction     h/o MI x3 prior to heart transplant    HLD (hyperlipidemia) 6/11/2015    Hx of psychiatric care     Hypoglycemia unawareness in type 1 diabetes mellitus     Hypothyroid     Initially hyperthyroid 2/2 Hoshimoto's thyroiditis; now on levothyroxine 150 mcg qd    Pleural effusion due to another disorder     Thought to be 2/2 heart tpx; s/p PleuRx catheter placement and removal after 1-2 months    Psychiatric problem     Pulmonary hypertension assoc with unclear multi-factorial mechanisms 12/12/2017    PAP 41 (EF 60%) on ECHO 12/12/17    Syncope 6/30/2015    Type I diabetes mellitus, well controlled     Well controlled; Dx'd @7y/o; on N insulin 20U BID & Humalog 10U w/meals +SSI; Glu 89 11/9/17; A1c 7.2% 4/5/17    Unspecified essential hypertension 05/29/2014    Well controlled; Last /57 on 11/9/17       Past Surgical History:   Procedure Laterality Date    CARDIAC CATHETERIZATION      CARDIAC SURGERY      CHOLECYSTECTOMY      COLONOSCOPY N/A 3/13/2018    Procedure: COLONOSCOPY;  Surgeon: Tim Spencer MD;  Location: [facility] ([address] [place] [address]);  Service: Endoscopy;  Laterality: N/A;    CORONARY ANGIOPLASTY      FOOT SPLIT TRANSFER OF THE POSTERIOR TIBIALIS TENDON PROCEDURE      HEART TRANSPLANT  2014    KNEE SURGERY      PleuRx " catheter placement  01/11/2018    Plan for removal after 1-2 months by Dr. James in cardiothoracic surgery    s/p oht  November 2014    stent placemnet         Review of patient's allergies indicates:   Allergen Reactions    No known drug allergies        Medications:  Prescriptions Prior to Admission   Medication Sig    albuterol-ipratropium (DUO-NEB) 2.5 mg-0.5 mg/3 mL nebulizer solution Take 3 mLs by nebulization every 4 (four) hours as needed for Wheezing. Rescue    ALPRAZolam (XANAX) 0.5 MG tablet Take 1 tablet (0.5 mg total) by mouth every 6 (six) hours as needed for Anxiety.    aspirin (ECOTRIN) 81 MG EC tablet Take 1 tablet (81 mg total) by mouth once daily.    cetirizine (ZYRTEC) 5 MG tablet Take 1 tablet (5 mg total) by mouth once daily.    dronabinol (MARINOL) 2.5 MG capsule Take 1 capsule (2.5 mg total) by mouth 2 (two) times daily.    escitalopram oxalate (LEXAPRO) 20 MG tablet Take 1 tablet (20 mg total) by mouth once daily.    gabapentin (NEURONTIN) 300 MG capsule Take 3 capsules (900 mg total) by mouth 3 (three) times daily.    mycophenolate (CELLCEPT) 250 mg Cap Take 1 capsule (250 mg total) by mouth 2 (two) times daily.    QUEtiapine (SEROQUEL) 50 MG tablet Take 1 tablet (50 mg total) by mouth every evening.    tacrolimus (PROGRAF) 1 MG Cap Take 4 capsules (4 mg total) by mouth every 12 (twelve) hours.    insulin aspart U-100 (NOVOLOG) 100 unit/mL InPn pen Inject 4 Units into the skin 3 (three) times daily.    insulin detemir U-100 (LEVEMIR FLEXTOUCH) 100 unit/mL (3 mL) SubQ InPn pen Inject 8 Units into the skin 2 (two) times daily.    lancets Misc 1 Device by Misc.(Non-Drug; Combo Route) route 4 (four) times daily with meals and nightly.    levalbuterol (XOPENEX) 0.63 mg/3 mL nebulizer solution Take 3 mLs (0.63 mg total) by nebulization every 4 (four) hours while awake. Rescue    levothyroxine (SYNTHROID) 137 MCG Tab tablet Take 1 tablet (137 mcg total) by mouth before  "breakfast.    magnesium oxide (MAG-OX) 400 mg tablet Take 1 tablet (400 mg total) by mouth once daily.    midodrine (PROAMATINE) 10 MG tablet Take 1 tablet (10 mg total) by mouth 3 (three) times daily with meals.    midodrine (PROAMATINE) 5 MG Tab Take 3 tablets (15 mg total) by mouth as needed (30 minutes prior to HD).    ondansetron (ZOFRAN-ODT) 4 MG TbDL Take 2 tablets (8 mg total) by mouth every 8 (eight) hours as needed (nausea).    oxyCODONE (ROXICODONE) 5 MG immediate release tablet Take 1 tablet (5 mg total) by mouth every 6 (six) hours as needed.    pantoprazole (PROTONIX) 40 MG tablet TAKE ONE TABLET BY MOUTH EVERY DAY    pen needle, diabetic 32 gauge x 5/32" Ndle Uses 5 pen needles a day    polyethylene glycol (GLYCOLAX) 17 gram PwPk Take 17 g by mouth 2 (two) times daily.    pravastatin (PRAVACHOL) 40 MG tablet Take 1 tablet (40 mg total) by mouth once daily.    predniSONE (DELTASONE) 2.5 MG tablet Take 1 tablet (2.5 mg total) by mouth once daily. S/P Heart Transplant 11/18/2014 ICD-10 Z94.1     Antibiotics     Start     Stop Route Frequency Ordered    06/15/18 1100  ceFEPIme injection 1 g      -- IV Every 8 hours (non-standard times) 06/15/18 0959        Antifungals     None        Antivirals     None           Immunization History   Administered Date(s) Administered    Hepatitis A / Hepatitis B 12/07/2012, 02/28/2017, 08/24/2017, 10/09/2017    Influenza - High Dose 11/10/2015, 10/02/2017    Influenza - Quadrivalent - PF 12/07/2012    Influenza - Trivalent - PF (ADULT) 12/07/2012    Influenza A (H1N1) 2009 Monovalent - IM - PF 11/12/2009    PPD Test 03/23/2018, 05/10/2018    Pneumococcal Conjugate - 13 Valent 10/02/2017    Pneumococcal Polysaccharide - 23 Valent 12/07/2012    Tdap 12/07/2012    Zoster 12/07/2012    influenza - Quadrivalent 11/18/2016       Family History     Problem Relation (Age of Onset)    Cancer Brother (50)    Depression Mother    Diabetes Mother, Father, " Brother, Son, Sister, Maternal Uncle, Paternal Aunt, Maternal Grandmother, Maternal Grandfather    Heart disease Mother, Brother    Hypertension Mother, Father, Brother    Kidney disease Mother, Father    Stroke Father, Brother    Vision loss Brother        Social History     Social History    Marital status:      Spouse name: N/A    Number of children: N/A    Years of education: N/A     Social History Main Topics    Smoking status: Never Smoker    Smokeless tobacco: Never Used    Alcohol use No    Drug use: No    Sexual activity: Yes     Partners: Female     Other Topics Concern    None     Social History Narrative            Breakfast: Skips 80%; cereal; Diet Sprite    Lunch: Turkey or chicken sandwich on white bread; Diet Sprite    Dinner: Grilled burgers, small amount chips; Diet Sprite    Snacks: Ronny crackers before bed on most nights    Eats out: 2x/wk    Water: 0    PA: Walks 15 minutes (strenuous) daily    Goal weight 170-175 lbs by 1/2018     Review of Systems   Constitutional: Positive for fatigue and fever. Negative for activity change, appetite change and chills.   HENT: Negative for congestion, dental problem, mouth sores and sinus pressure.    Eyes: Negative for pain, redness and visual disturbance.   Respiratory: Positive for cough and shortness of breath. Negative for wheezing.    Cardiovascular: Negative for chest pain and leg swelling.   Gastrointestinal: Negative for abdominal distention, abdominal pain, diarrhea, nausea and vomiting.   Endocrine: Negative for polyuria.   Genitourinary: Negative for decreased urine volume, dysuria and frequency.   Musculoskeletal: Negative for joint swelling.   Skin: Negative for color change.   Allergic/Immunologic: Negative for food allergies.   Neurological: Negative for dizziness, weakness and headaches.   Hematological: Negative for adenopathy.   Psychiatric/Behavioral: Negative for agitation and confusion. The patient is not  nervous/anxious.      Objective:     Vital Signs (Most Recent):  Temp: 98.9 °F (37.2 °C) (06/17/18 0700)  Pulse: (!) 130 (06/17/18 0829)  Resp: (!) 23 (06/17/18 0829)  BP: (!) 90/46 (06/17/18 0800)  SpO2: 100 % (06/17/18 0829) Vital Signs (24h Range):  Temp:  [98.1 °F (36.7 °C)-100.3 °F (37.9 °C)] 98.9 °F (37.2 °C)  Pulse:  [] 130  Resp:  [2-28] 23  SpO2:  [86 %-100 %] 100 %  BP: ()/(35-62) 90/46     Weight: 74.2 kg (163 lb 9.3 oz)  Body mass index is 25.62 kg/m².    Estimated Creatinine Clearance: 146.9 mL/min (based on SCr of 0.6 mg/dL).    Physical Exam   Constitutional: He is oriented to person, place, and time. He appears well-developed and well-nourished.   HENT:   Head: Normocephalic and atraumatic.   Mouth/Throat: Oropharynx is clear and moist.   Eyes: Conjunctivae are normal.   Neck: Neck supple.   Cardiovascular: Normal rate, regular rhythm and normal heart sounds.    No murmur heard.  Pulmonary/Chest: Effort normal. No respiratory distress. He has wheezes.   Abdominal: Soft. Bowel sounds are normal. He exhibits no distension. There is no tenderness.   Musculoskeletal: Normal range of motion. He exhibits no edema or tenderness.   Lymphadenopathy:     He has no cervical adenopathy.   Neurological: He is alert and oriented to person, place, and time. Coordination normal.   Skin: Skin is warm and dry. No rash noted.   Psychiatric: He has a normal mood and affect. His behavior is normal.       Significant Labs:   CBC:   Recent Labs  Lab 06/15/18  1135 06/16/18  0358 06/17/18  0403   WBC 5.96 5.19 3.28*   HGB 8.4* 7.8* 7.2*   HCT 27.7* 26.5* 24.6*    184 148*     CMP:   Recent Labs  Lab 06/15/18  1135  06/16/18  0358 06/16/18  1347 06/16/18  2156 06/17/18  0403   *  < > 142  142 143 140 141  141   K 3.7  < > 4.1  4.1 4.4 4.3 4.3  4.3   CL 99  < > 105  105 107 105 105  105   CO2 22*  < > 24  24 24 19* 20*  20*   *  < > 134*  134* 141* 219* 158*  158*   BUN 13  < > 7  7  3* 4* 3*  3*   CREATININE 2.9*  < > 1.3  1.3 0.7 0.8 0.6  0.6   CALCIUM 8.0*  < > 8.1*  8.1* 8.3* 8.2* 8.2*  8.2*   PROT 5.2*  --  5.2*  --   --  5.4*   ALBUMIN 2.2*  < > 2.1*  2.1* 2.1* 2.2* 2.2*  2.2*   BILITOT 0.9  --  1.1*  --   --  0.7   ALKPHOS 129  --  137*  --   --  138*   AST 27  --  14  --   --  13   ALT 10  --  6*  --   --  7*   ANIONGAP 14  < > 13  13 12 16 16  16   EGFRNONAA 25.2*  < > >60.0  >60.0 >60.0 >60.0 >60.0  >60.0   < > = values in this interval not displayed.    Significant Imaging: I have reviewed all pertinent imaging results/findings within the past 24 hours.   6/15 CT chest  Large right and small left pleural effusions.  Worsening bilateral airspace disease, most pronounced in the aerated left lung, suggestive of pulmonary edema.  Pneumonia or aspiration would also be considered in the correct clinical setting.    Rounded right lower lobe opacity, presumably round atelectasis, unchanged when compared with the prior chest CT.    Moderate volume ascites and diffuse mesenteric/body wall edema.    Moderate volume fecal loading in the colon which may reflect constipation.    Postoperative changes of orthotopic heart transplantation.    6/15 sputum cx NGTD  6/15 bcx NGTD

## 2018-06-18 NOTE — ASSESSMENT & PLAN NOTE
-Sputum culture NGTD.  -Appreciate ID hepl. Continue Cefepime.  -Considering thoracentesis.  -Palliative care to meet with family today regarding goals of care.

## 2018-06-18 NOTE — PT/OT/SLP PROGRESS
Physical Therapy      Patient Name:  Lv Crocker   MRN:  [id number]    Patient not seen today. Pt on CRRT and pressors. Pt appropriate for bed level activity only today. OT to perform bed level exercises. Will follow-up when appropriate.    Teresa Dudley, PT, DPT  6/18/2018  [phone]'

## 2018-06-18 NOTE — PROGRESS NOTES
Antimicrobial Stewardship Review    : 1975    Estimated Creatinine Clearance: 146.9 mL/min (based on SCr of 0.6 mg/dL).    Wt Readings from Last 1 Encounters:   18 74.2 kg (163 lb 9.3 oz)       Review of patient's allergies indicates:   Allergen Reactions    No known drug allergies        This patient has been identified as being at moderate to high risk for development of a hospital acquired infection (VIRGEN). Based on review of the information provided within the electronic medical record at this time, the Antimicrobial Stewardship Team recommends the following changes to the medication record to aid in mitigating VIRGEN risk during this admission:    Switching to Ceftriaxone for ongoing empiric coverage of pneumonia.    As recommended above, the respective orders have been signed awaiting co-signature from the attending physician.    Disclaimer: Recommendations provided by the Antimicrobial Stewardship Team do not constitute formal consultation by the Infectious Diseases service. Decisions made are based on readily retrievable data from the electronic medical record at the time of review only. Final treatment decisions rest with the primary team however, the ID service is available for consultation if clinically warranted. Please page [phone] - BUGS for additional questions or advice.

## 2018-06-18 NOTE — PROGRESS NOTES
CRRT NOTES    CRRT Machine and Equipments Changed. Blood rinse back done. Hooked to CRRT Machine at 2300H with UF rate of 400cc/hr. For continuity of treatment.

## 2018-06-18 NOTE — NURSING
Rounds Report: Attended interdisciplinary rounds this morning with the transplant team including SW, physicians, fellows,  mid-level providers, and transplant coordinators.  Discussed plan of care, including Palliative care team to meet with pt and his wife regarding goals of care.   Visited pt, brother at bedside, pt unable to stay aroused. Not able to take medications. Sat with brother, David, who was very emotional. Will wait for pt's wife to arrive later to go visit and discuss care with her.

## 2018-06-18 NOTE — NURSING
Rec'd update from Dr ELY Lloyd, Kamini, RN and Dr Mcneil along with Elizabeth (wife), pt's brother and niece, Annabelle after family meeting.  Need to restart CRRT and see if pt tolerates CRRT AEB better labs.  Intubation would be a possibility if pt is improving with CRRT but needs airway protection.  Pt remains a full code.  Dr. Mcneil was present in family conference and is on service tonight.       Dialysis RN was called immediately and left message for Ting dialysis RN with nurse who answered phone that CRRT needs to be restarted asap.     Norepi was maxed concentrated and ordered; approved by Dr Lloyd to decrease fluid intake per hour.

## 2018-06-18 NOTE — PROGRESS NOTES
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 Per wife, patient was started on Vancomycin /zosyn/1 L NS bolus for   SBP 60s in which he became very lethargic then subsequently intubated started on Levo prior to transfer to [facility]. In ED, patient tolerated wean FIO2 100 to 45%. CXR interstitial markings and multifocal airspace consolidation in both lungs and right pleural effusion similar to previous. Plan for CT chest and abdomen to further evaluation lung and possible thoracentesis.     Recent Hospitalization    5/29-6/11/18   Admitted for fever initially started on Vanc/cefepime empirically for FUO. Had BAL in which culture and cytology negative. C diff neg. Podiatry evaluated right toe 1-3 and left toe 1-2 healing eschar and no infection. Discharge to [facility].      3/11-5/15/18  Admitted for diarrhea and RSV complicated with respiratory failure requiring intubation/trach/PEG (both Trach/PEG removed 5/10) and CACHORRO on superimposed on CKD stage 4 secondary to prograft toxcity?/ischemic ATN (see last admission consult  3/11-5/18/18-discharged to [facility])     6/15 Nephrology consult for hemodialysis. ESRD MWF via [facility] (since 3/14/2018). Last HD yesterday 6/14 at [facility]. EDW 73.5 kg? Remains anuric.     6/16 on SLED this morning and has tolerated ok overnight     Interval History: Somnolent this am. ABG worsen acidosis. SLED Net neg 1.5 L. Goals of care discussion with wife when she arrives today.     Review of patient's allergies indicates:   Allergen Reactions    No known drug allergies      Current Facility-Administered Medications   Medication Frequency    0.9%  NaCl infusion (CRRT USE ONLY) Continuous    acetaminophen tablet 650 mg Q6H PRN    albuterol-ipratropium 2.5 mg-0.5 mg/3 mL nebulizer solution 3 mL Q4H    ceFEPIme injection 1 g Q8H    chlorhexidine 0.12 % solution 15 mL BID    dexmedetomidine (PRECEDEX) 400mcg/100mL 0.9% NaCL infusion Continuous    dextrose 50% injection 12.5 g PRN    epoetin jose miguel injection 7,700 Units Every Mon, Wed, Fri    escitalopram oxalate tablet 20 mg Daily    glucagon (human recombinant) injection 1 mg PRN    heparin (porcine) injection 5,000 Units Q12H    insulin aspart U-100 pen 0-5 Units Q6H PRN    levothyroxine tablet 137 mcg Before breakfast    lorazepam injection 1 mg Q6H PRN    magnesium oxide tablet 400 mg Daily    magnesium sulfate 2g in water 50mL IVPB (premix) PRN    norepinephrine 4 mg in dextrose 5% 250 mL infusion (premix) (titrating) Continuous    ondansetron disintegrating tablet 8 mg Q8H PRN    pantoprazole (PROTONIX) 40 mg in dextrose 5 % 100 mL IVPB Daily    polyethylene glycol packet 17 g BID    predniSONE tablet 5 mg Daily    QUEtiapine tablet 50 mg QHS    sodium phosphate 20.01 mmol in dextrose 5 % 250 mL IVPB PRN    sodium phosphate 30 mmol in dextrose 5 % 250 mL IVPB PRN    sodium phosphate 39.99 mmol in dextrose 5 % 250 mL IVPB PRN    tacrolimus capsule 4 mg BID       Objective:     Vital Signs (Most Recent):  Temp: 99.1 °F  (37.3 °C) (06/18/18 1100)  Pulse: (!) 119 (06/18/18 1200)  Resp: (!) 21 (06/18/18 1200)  BP: (!) 85/52 (06/18/18 1200)  SpO2: 100 % (06/18/18 1200)  O2 Device (Oxygen Therapy): nasal cannula (06/18/18 1125) Vital Signs (24h Range):  Temp:  [96.7 °F (35.9 °C)-99.1 °F (37.3 °C)] 99.1 °F (37.3 °C)  Pulse:  [109-134] 119  Resp:  [14-34] 21  SpO2:  [93 %-100 %] 100 %  BP: ()/(33-59) 85/52     Weight: 74.2 kg (163 lb 9.3 oz) (06/17/18 0300)  Body mass index is 25.62 kg/m².  Body surface area is 1.87 meters squared.    I/O last 3 completed shifts:  In: 23957.1 [P.O.:1254; I.V.:8288.1; IV Piggyback:500]  Out: 99792 [Other:33466]    Physical Exam   Constitutional: He appears well-developed.   Ill appearing. Somnolent this am.    HENT:   Head: Atraumatic.   Eyes: EOM are normal.   Neck: Neck supple.   Cardiovascular: Tachycardia present.    Pulmonary/Chest:   Coarse anteriorly.    Abdominal: Soft. Bowel sounds are normal. He exhibits distension. There is no tenderness.   Ascites.    Musculoskeletal: He exhibits edema (2 +BLE-improved). He exhibits no tenderness.   Neurological:   Very sleepy .    Skin: Skin is warm and dry.   -L IJ Permacath site no signs of tunnel or exit site infection.  -Right necrotic toes 1-3-similar to previous admission  -Left great toe eschar-improved compared to previous admission.           Significant Labs:  All labs within the past 24 hours have been reviewed.     Significant Imaging:  Labs: Reviewed    Assessment/Plan:     ESRD (end stage renal disease)    Hx of CACHORRO on superimposed on CKD stage 4 secondary to prograft toxcity/ischemic ATN during last admission 3/11 now dialysis dependent via [facility] (since 3/14/2018) who is transferred to [facility] via air ambulance from [facility] for fever 103. Priro to arrival patient was intubated and started on Levo.  -L IJ permacath no overt signs of tunnel or exit site infection.   -Able to remove volume with SLED over weekend. Remains on  Levo for pressure support. Very minimal hr intake-levo.   -Primary considering thoracentesis and will have goals of care discussion with wife today. Continue SLED for acidosis-Increase bicarb 35 to 40.  ml.  Discussed with primary team.       Anemia of CKD  -stable from compared to previous labs.   -No iron in setting of possible infection.  -Continue epo              Thank you for your consult. I will follow-up with patient. Please contact us if you have any additional questions.    Amanda Cuellar NP  Nephrology  [facility]

## 2018-06-18 NOTE — PLAN OF CARE
Problem: Occupational Therapy Goal  Goal: Occupational Therapy Goal  Goals to be met by:  7 days 6/25/18    Patient will increase functional independence with ADLs by performing:    Pt/fly to demo independence with UE HEP to increase functional strength/ROM  Pt to complete basic g/h skills with MIN A in supported sitting.       Goals established this date.

## 2018-06-18 NOTE — PROGRESS NOTES
Admit Note     Pt asleep in room and does not rouse. Pt's brother David present. David confirms no changes since note written at last admission (with appropriate updates regarding discharge):    Met with patient, spouse and brother to assess needs. Patient is a 42 y.o.  male, admitted CHF (congestive heart failure) [I50.9], Acute respiratory failure with hypoxia [J96.01], Respiratory failure, unspecified chronicity, unspecified whether with hypoxia or hypercapnia [J96.90] and Heart transplant recipient [Z94.1] per medical record. Pt received heart transplant 11/18/2014.      Patient admitted from outside facility on 6/15/2018.  At this time, patient presents as asleep.  At this time, patients caregiver presents as alert and oriented x 4, good eye contact and communicative.    Household/Family Systems     Patient resides with patient's spouse and two children ages 15 and 20, at     [address] [place] [address].      Support system includes spouse, children and extended family. Patient does have dependents that are in need of being cared for. Patient's 15 yr old are being cared for by family.     Patients primary caregiver is Elizabeth Crocker , patients spouse, phone number [phone].    Pt's cell:  [phone]    Additional emergency contacts:  David Crocker (brother) [phone]  Annalisa Navas (mother in law) [phone]    During admission, patient's caregiver plans to stay in patient's room. Confirmed patient and patients caregivers do have access to reliable transportation.    Cognitive Status/Learning     Patient reports reading ability as college and states patient does not have difficulty with reading, writing, seeing, hearing, comprehension and learning. Pt reports issues with short term memory.   Patient reports patient learns best by one on one.   Needed: No.   Highest education level: college    Vocation/Disability   .  Working for Income: No  If no, reason not working:  Disability  Patient is disabled due to heart failure since .  Prior to disability, patient  was employed as a teacher and  for [employer]..  Pt's spouse works full time at [employer].    Adherence     Patient reports a high level of adherence to patients health care regimen.  Adherence counseling and education provided. Patient verbalizes understanding.    Substance Use    Patient reports the following substance usage.    Tobacco: none, patient denies any use.  Alcohol: none, patient denies any use.  Illicit Drugs/Non-prescribed Medications: none, patient denies any use.  Patient states clear understanding of the potential impact of substance use.  Substance abstinence/cessation counseling, education and resources provided and reviewed.     Services Utilizing/ADLS    Infusion Service: Prior to admission, patient utilizing? no  Home Health: Prior to admission, patient utilizing? no Pt has used [facility] in the past  DME: Prior to admission, no  Pulmonary/Cardiac Rehab: Prior to admission, no Pt has gone to [facility] in the past. At last discharge pt went to [facility]  [phone], fax [phone], in [place] and was getting HD at the [facility] across the street from them.  Dialysis:  Prior to admission, no  Transplant Specialty Pharmacy:  Prior to admission, no.    Prior to admission, pt was at rehab and getting out pt HD. Pt's spouse drives. Patient reports patient is not able to care for self at this time due to compromised medical condition (as documented in medical record) and physical weakness..  Patient indicates a willingness to care for self once medically cleared to do so.    Insurance/Medications    Insured by   Payor/Plan Subscr  Sex Relation Sub. Ins. ID Effective Group Num   1. BCBS MGD MEDI* DARRELL OLIVAS M* 1975 Male  [id number]                                   [address] [address]      Primary Insurance (for  UNOS reporting): Public Insurance - Medicare FFS (Fee For Service)  Secondary Insurance (for UNOS reporting): None    Patient reports patient is not sure if he will be able to obtain and afford medications at this time and at time of discharge.  Pt and spouse reports multiple issues with insurance, billing and payment plans.      Living Will/Healthcare Power of     Patient states patient has a LW and/or HCPA. Pt reports spouse is the HCPA.   provided education regarding LW and HCPA and the completion of forms.    Coping/Mental Health    Patient is not coping well, due to medical and insurance issues.  Patient indicates mental health difficulties. The pt reports issues with anxiety and depression.  The pt is taking Lexapro for depression.  Pt's wife Jessica has expressed concerns in the past about pt's insurance coverage.     Discharge Planning    At time of discharge, patient plans to return to rehab or home under the care of spouse. Patients spouse or medical transportation will transport patient.  Per rounds today, expected discharge date has not been medically determined at this time. Patient and patients caregiver verbalize understanding and are involved in treatment planning and discharge process.    Additional Concerns    Patient is being followed for needs, education, resources, information, emotional support, supportive counseling, and for supportive and skilled discharge plan of care.  providing ongoing psychosocial support, education, resources and d/c planning as needed.  SW remains available. Patient's caregiver verbalizes understanding and agreement with information reviewed,  availability and how to access available resources as needed. Patient verbalizes understanding and agreement with information reviewed, social work availability, and how to access available resources as needed.

## 2018-06-18 NOTE — CONSULTS
Palliative Care Acknowledgement of Consult - .date    Consult received. Palliative Care Provider: VAISHALI Ramsey will touch base with team prior to seeing patient. Full consult to follow.    Thank you for allowing us to be a part of the care of this patient.          Eli Martínez LCSW, ACHP-SW

## 2018-06-18 NOTE — PT/OT/SLP PROGRESS
Speech Language Pathology      Lv Crocker  MRN: [id number]    SLP evaluation orders received. SLP attempts to see Patient.  Patient not able to sustain attention and easilty falls back to sleep across SLP attempts to initiate assessment. Patient not seen today secondary to Patient fatigue. ST to continue to monitor and will re-attempt 6/19/17. Thank you.    JOSELYN Delarosa., [facility]-SLP  Speech-Language Pathology  Pager: [phone]  6/18/2018

## 2018-06-18 NOTE — SUBJECTIVE & OBJECTIVE
Interval History: Somnolent this am. ABG worsen acidosis. SLED Net neg 1.5 L. Goals of care discussion with wife when she arrives today.     Review of patient's allergies indicates:   Allergen Reactions    No known drug allergies      Current Facility-Administered Medications   Medication Frequency    0.9%  NaCl infusion (CRRT USE ONLY) Continuous    acetaminophen tablet 650 mg Q6H PRN    albuterol-ipratropium 2.5 mg-0.5 mg/3 mL nebulizer solution 3 mL Q4H    ceFEPIme injection 1 g Q8H    chlorhexidine 0.12 % solution 15 mL BID    dexmedetomidine (PRECEDEX) 400mcg/100mL 0.9% NaCL infusion Continuous    dextrose 50% injection 12.5 g PRN    epoetin jose miguel injection 7,700 Units Every Mon, Wed, Fri    escitalopram oxalate tablet 20 mg Daily    glucagon (human recombinant) injection 1 mg PRN    heparin (porcine) injection 5,000 Units Q12H    insulin aspart U-100 pen 0-5 Units Q6H PRN    levothyroxine tablet 137 mcg Before breakfast    lorazepam injection 1 mg Q6H PRN    magnesium oxide tablet 400 mg Daily    magnesium sulfate 2g in water 50mL IVPB (premix) PRN    norepinephrine 4 mg in dextrose 5% 250 mL infusion (premix) (titrating) Continuous    ondansetron disintegrating tablet 8 mg Q8H PRN    pantoprazole (PROTONIX) 40 mg in dextrose 5 % 100 mL IVPB Daily    polyethylene glycol packet 17 g BID    predniSONE tablet 5 mg Daily    QUEtiapine tablet 50 mg QHS    sodium phosphate 20.01 mmol in dextrose 5 % 250 mL IVPB PRN    sodium phosphate 30 mmol in dextrose 5 % 250 mL IVPB PRN    sodium phosphate 39.99 mmol in dextrose 5 % 250 mL IVPB PRN    tacrolimus capsule 4 mg BID       Objective:     Vital Signs (Most Recent):  Temp: 99.1 °F (37.3 °C) (06/18/18 1100)  Pulse: (!) 119 (06/18/18 1200)  Resp: (!) 21 (06/18/18 1200)  BP: (!) 85/52 (06/18/18 1200)  SpO2: 100 % (06/18/18 1200)  O2 Device (Oxygen Therapy): nasal cannula (06/18/18 1125) Vital Signs (24h Range):  Temp:  [96.7 °F (35.9 °C)-99.1 °F  (37.3 °C)] 99.1 °F (37.3 °C)  Pulse:  [109-134] 119  Resp:  [14-34] 21  SpO2:  [93 %-100 %] 100 %  BP: ()/(33-59) 85/52     Weight: 74.2 kg (163 lb 9.3 oz) (06/17/18 0300)  Body mass index is 25.62 kg/m².  Body surface area is 1.87 meters squared.    I/O last 3 completed shifts:  In: 08983.1 [P.O.:1254; I.V.:8288.1; IV Piggyback:500]  Out: 77123 [Other:20208]    Physical Exam   Constitutional: He appears well-developed.   Ill appearing. Somnolent this am.    HENT:   Head: Atraumatic.   Eyes: EOM are normal.   Neck: Neck supple.   Cardiovascular: Tachycardia present.    Pulmonary/Chest:   Coarse anteriorly.    Abdominal: Soft. Bowel sounds are normal. He exhibits distension. There is no tenderness.   Ascites.    Musculoskeletal: He exhibits edema (2 +BLE-improved). He exhibits no tenderness.   Neurological:   Very sleepy .    Skin: Skin is warm and dry.   -L IJ Permacath site no signs of tunnel or exit site infection.  -Right necrotic toes 1-3-similar to previous admission  -Left great toe eschar-improved compared to previous admission.           Significant Labs:  All labs within the past 24 hours have been reviewed.     Significant Imaging:  Labs: Reviewed

## 2018-06-18 NOTE — PT/OT/SLP EVAL
"Occupational Therapy   Evaluation    Name: Lv Crocker  MRN: [id number]  Admitting Diagnosis:  Acute respiratory failure with hypoxia     Pt admitted from [facility] with recent PNA, hypotension and decreased responsiveness. Pt was intubated and then transported to [facility].     Recommendations:     Discharge Recommendations:  (TBD)      History:     Occupational Profile:  Living Environment: Pt has been in [facility] with recent admit to [facility] 5/29/18-6/1/18 due to PNA.  Previous level of function: Upon last admit, documentation reveals he was requiring 2 persons for stand and tolerated 1.5 hour OOB daily.       Past Medical History:   Diagnosis Date    Anxiety     Arthritis     Ascites     Thought to be 2/2 heart tpx; liver bx 3/31/17 w/o significant fibrosis    Cardiomyopathy     Depression     Controlled "for the most part" on Lexipro    Encounter for blood transfusion     during transplant    GERD (gastroesophageal reflux disease)     Hashimoto's disease     History of CHF (congestive heart failure)     Previously EF 20% (prior to heart transplant); last EF 60%, PAP 41 as of 12/12/17; throught to be 2/2 genetics & DM1    History of coronary artery disease     H/o Coronary artery disease; resolved since heart transplant 2014    History of myocardial infarction     h/o MI x3 prior to heart transplant    HLD (hyperlipidemia) 6/11/2015    Hx of psychiatric care     Hypoglycemia unawareness in type 1 diabetes mellitus     Hypothyroid     Initially hyperthyroid 2/2 Hoshimoto's thyroiditis; now on levothyroxine 150 mcg qd    Pleural effusion due to another disorder     Thought to be 2/2 heart tpx; s/p PleuRx catheter placement and removal after 1-2 months    Psychiatric problem     Pulmonary hypertension assoc with unclear multi-factorial mechanisms 12/12/2017    PAP 41 (EF 60%) on ECHO 12/12/17    Syncope 6/30/2015    Type I diabetes mellitus, well controlled     Well " controlled; Dx'd @5y/o; on N insulin 20U BID & Humalog 10U w/meals +SSI; Glu 89 11/9/17; A1c 7.2% 4/5/17    Unspecified essential hypertension 05/29/2014    Well controlled; Last /57 on 11/9/17       Past Surgical History:   Procedure Laterality Date    CARDIAC CATHETERIZATION      CARDIAC SURGERY      CHOLECYSTECTOMY      COLONOSCOPY N/A 3/13/2018    Procedure: COLONOSCOPY;  Surgeon: Tim Spencer MD;  Location: [facility] ([address] [place] [address]);  Service: Endoscopy;  Laterality: N/A;    CORONARY ANGIOPLASTY      FOOT SPLIT TRANSFER OF THE POSTERIOR TIBIALIS TENDON PROCEDURE      HEART TRANSPLANT  2014    KNEE SURGERY      PleuRx catheter placement  01/11/2018    Plan for removal after 1-2 months by Dr. James in cardiothoracic surgery    s/p oht  November 2014    stent placemnet         Subjective     Pt with no verbalizations.     Communicated with: nsg prior to session.  Pain/Comfort:  · Pain Rating 1: 0/10    Patients cultural, spiritual, Sabianism conflicts given the current situation:    None noted     Objective:     Patient found supine in bed with CRRT running via left subclavian.  Pt is on pressors and approved for gentle PROM only.       General Precautions: Standard, fall          Occupational Performance:  No bed mobility/ADLs completed this date.     Cognitive/Visual Perceptual:  Pt did open eyes in response to name. Minimal visual tracking noted. Pt with flat affect. Pt did follow occasional one step commands.       Physical Exam:  Pt demo poor  and 1/5 UE strength.  OT unable to assess sensation due to decreased alertness and no verbalizations.     Patient left supine with all lines intact, call button in reach, nsg notified and fly present    [facility] 6 Click:  [facility] Total Score: 6    Treatment & Education:  Pt tolerated UE/LE gentle PROM exs. Slow prolonged stretching to B ankles in dorsi flexion.  Pt with minimal participation in UE exs, but no active participation for LE ROM. At  "times, pt possibly resisting movement in LE's, but difficult to determine due to decreased alertness and decreased functional strength. Pt without grimacing and vital signs remained stable.  Education provided to pt/fly re: current OT POC.   Education:    Assessment:     Lv Crocker is a 42 y.o. male with a medical diagnosis of Acute respiratory failure with hypoxia.    Performance deficits affecting function are weakness, impaired functional mobilty, gait instability, impaired endurance, impaired balance, impaired self care skills.  Fair tolerance for activity this date. VSS throughout session, but ROM only appropriate this date.  Palliative care consult noted with plans to further discuss goals of care with fly   Nsg and palliative care staff approved ROM this date.  Goals set as able and to be revised pending medical plan.     Rehab Prognosis:  Fair ; patient would benefit from acute skilled OT services to address these deficits and reach maximum level of function.         Clinical Decision Makin.  OT Low:  "Pt evaluation falls under low complexity for evaluation coding due to performance deficits noted in 1-3 areas as stated above and 0 co-morbities affecting current functional status. Data obtained from problem focused assessments. No modifications or assistance was required for completion of evaluation. Only brief occupational profile and history review completed."     Plan:     Patient to be seen 3 x/week to address the above listed problems via self-care/home management, therapeutic activities, therapeutic exercises  · Plan of Care Expires:    · Plan of Care Reviewed with: patient    This Plan of care has been discussed with the patient who was involved in its development and understands and is in agreement with the identified goals and treatment plan    GOALS:    Occupational Therapy Goals        Problem: Occupational Therapy Goal    Goal Priority Disciplines Outcome Interventions "   Occupational Therapy Goal     OT, PT/OT     Description:  Goals to be met by:  7 days 6/25/18    Patient will increase functional independence with ADLs by performing:    Pt/fly to demo independence with UE HEP to increase functional strength/ROM  Pt to complete basic g/h skills with MIN A in supported sitting.                         Time Tracking:     OT Date of Treatment: 06/18/18  OT Start Time: 1100  OT Stop Time: 1120  OT Total Time (min): 20 min    Billable Minutes:Evaluation 10  Therapeutic Exercise 10    AMADOU Villanueva  6/18/2018

## 2018-06-18 NOTE — PROGRESS NOTES
[facility]  Heart Transplant  Progress Note    Patient Name: Lv Crocker  MRN: [id number]  Admission Date: 6/15/2018  Hospital Length of Stay: 3 days  Attending Physician: Behzad Lara Jr.,*  Primary Care Provider: Philippe Mohr MD  Principal Problem:Acute respiratory failure with hypoxia    Subjective:     Interval History: Somnolent this am. Awakens with deep stimulation but drifts off to sleep. Wife Elizabeth called and made aware. Attempted to get ABG but unable. VBG with ph 7.1, CO2 45, Bicarb 15, Venous sat 43%. Elizabeth voiced that she does not want him re intubated if possible.     Continuous Infusions:   sodium chloride 0.9% 200 mL/hr at 06/18/18 0600    sodium chloride 0.9%      dexmedetomidine (PRECEDEX) infusion Stopped (06/16/18 0900)    norepinephrine bitartrate-D5W 0.1 mcg/kg/min (06/18/18 0600)     Scheduled Meds:   albuterol-ipratropium  3 mL Nebulization Q4H    aspirin  81 mg Oral Daily    ceFEPime (MAXIPIME) IVPB  1 g Intravenous Q8H    chlorhexidine  15 mL Mouth/Throat BID    enoxaparin  30 mg Subcutaneous Daily    epoetin jose miguel (PROCRIT) injection  100 Units/kg Intravenous Every Mon, Wed, Fri    escitalopram oxalate  20 mg Oral Daily    gabapentin  300 mg Oral TID    levothyroxine  137 mcg Oral Before breakfast    magnesium oxide  400 mg Oral Daily    mycophenolate mofetil  250 mg Oral BID    pantoprozole (PROTONIX) 40 mg/100 mL D5W IVPB  40 mg Intravenous Daily    polyethylene glycol  17 g Oral BID    pravastatin  40 mg Oral Daily    predniSONE  5 mg Oral Daily    QUEtiapine  50 mg Oral QHS    tacrolimus  4 mg Per G Tube Daily    tacrolimus  5 mg Per G Tube Daily     PRN Meds:acetaminophen, dextrose 50%, glucagon (human recombinant), insulin aspart U-100, lorazepam, magnesium sulfate IVPB, magnesium sulfate IVPB, ondansetron, sodium phosphate IVPB, sodium phosphate IVPB, sodium phosphate IVPB, sodium phosphate IVPB, sodium phosphate IVPB, sodium  phosphate IVPB    Review of patient's allergies indicates:   Allergen Reactions    No known drug allergies      Objective:     Vital Signs (Most Recent):  Temp: 96.7 °F (35.9 °C) (06/18/18 0315)  Pulse: 109 (06/18/18 0741)  Resp: (!) 23 (06/18/18 0741)  BP: (!) 112/59 (06/18/18 0730)  SpO2: 100 % (06/18/18 0741) Vital Signs (24h Range):  Temp:  [96.7 °F (35.9 °C)-98.7 °F (37.1 °C)] 96.7 °F (35.9 °C)  Pulse:  [109-138] 109  Resp:  [19-34] 23  SpO2:  [93 %-100 %] 100 %  BP: ()/(42-67) 112/59     Patient Vitals for the past 72 hrs (Last 3 readings):   Weight   06/17/18 0300 74.2 kg (163 lb 9.3 oz)   06/16/18 1100 76.2 kg (167 lb 15.9 oz)   06/16/18 0300 76.2 kg (167 lb 15.9 oz)     Body mass index is 25.62 kg/m².      Intake/Output Summary (Last 24 hours) at 06/18/18 0834  Last data filed at 06/18/18 0600   Gross per 24 hour   Intake          5999.75 ml   Output             7894 ml   Net         -1894.25 ml       Hemodynamic Parameters:       Telemetry: ST    Physical Exam   Constitutional: He is oriented to person, place, and time. He appears lethargic. He is sleeping. He has a sickly appearance.   HENT:   Head: Normocephalic.   Eyes: Pupils are equal, round, and reactive to light.   Neck: Normal range of motion. Neck supple.   Cardiovascular: Normal rate and regular rhythm.    Pulmonary/Chest: Effort normal and breath sounds normal.   Abdominal: Soft. Bowel sounds are normal.   Musculoskeletal: Normal range of motion.   Neurological: He is oriented to person, place, and time. He appears lethargic.   Skin: Skin is warm and dry.   Psychiatric: He has a normal mood and affect. His behavior is normal.   Nursing note and vitals reviewed.      Significant Labs:  CBC:    Recent Labs  Lab 06/16/18  0358 06/17/18  0403 06/18/18  0408   WBC 5.19 3.28* 7.60   RBC 2.66* 2.45* 2.60*   HGB 7.8* 7.2* 7.7*   HCT 26.5* 24.6* 26.8*    148* 196   * 100* 103*   MCH 29.3 29.4 29.6   MCHC 29.4* 29.3* 28.7*      BNP:  No results for input(s): BNP in the last 168 hours.    Invalid input(s): BNPTRIAGELBLO  CMP:    Recent Labs  Lab 06/16/18  0358  06/17/18  0403 06/17/18  1400 06/17/18  2205 06/18/18  0408   *  134*  < > 158*  158* 149* 197* 110  110   CALCIUM 8.1*  8.1*  < > 8.2*  8.2* 8.4* 8.5* 8.3*  8.3*   ALBUMIN 2.1*  2.1*  < > 2.2*  2.2* 2.3* 2.3* 2.5*  2.5*   PROT 5.2*  --  5.4*  --   --  5.9*     142  < > 141  141 142 139 139  139   K 4.1  4.1  < > 4.3  4.3 4.2 4.0 3.7  3.7   CO2 24  24  < > 20*  20* 20* 13* 16*  16*     105  < > 105  105 105 105 105  105   BUN 7  7  < > 3*  3* 2* 4* 5*  5*   CREATININE 1.3  1.3  < > 0.6  0.6 0.6 0.9 1.1  1.1   ALKPHOS 137*  --  138*  --   --  150*   ALT 6*  --  7*  --   --  7*   AST 14  --  13  --   --  16   BILITOT 1.1*  --  0.7  --   --  0.5   < > = values in this interval not displayed.   Coagulation:   No results for input(s): PT, INR, APTT in the last 168 hours.  LDH:  No results for input(s): LDH in the last 72 hours.  Microbiology:  Microbiology Results (last 7 days)     Procedure Component Value Units Date/Time    Blood culture x two cultures. Draw prior to antibiotics. [[id number]] Collected:  06/15/18 1055    Order Status:  Completed Specimen:  Blood from Peripheral, Forearm, Right Updated:  06/17/18 1212     Blood Culture, Routine No Growth to date     Blood Culture, Routine No Growth to date     Blood Culture, Routine No Growth to date    Narrative:       Aerobic and anaerobic    Blood culture x two cultures. Draw prior to antibiotics. [[id number]] Collected:  06/15/18 1135    Order Status:  Completed Specimen:  Blood from Midline, Basilic, Left Updated:  06/17/18 1212     Blood Culture, Routine No Growth to date     Blood Culture, Routine No Growth to date     Blood Culture, Routine No Growth to date    Narrative:       Aerobic and anaerobic    Culture, Respiratory with Gram Stain [[id number]] Collected:  06/15/18 4936     Order Status:  Completed Specimen:  Respiratory from Endotracheal Aspirate Updated:  06/17/18 1048     Respiratory Culture No Growth     Gram Stain (Respiratory) <10 epithelial cells per low power field.     Gram Stain (Respiratory) Many WBC's     Gram Stain (Respiratory) No organisms seen          I have reviewed all pertinent labs within the past 24 hours.    Estimated Creatinine Clearance: 80.1 mL/min (based on SCr of 1.1 mg/dL).    Diagnostic Results:  I have reviewed and interpreted all pertinent imaging results/findings within the past 24 hours.    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 Recent long hospitalization due to septic shock, RSV who presents from inpatient rehab with fever and acute resp failure.He was recently admitted from rehab for fevers and was treated with course of IV abx and transferred back to rehab after symptoms resolved. Prior to that admit, he had a prolonged hospital stay secondary to FUO and respiratory failure. He was treated with empiric abx and antifungals. He required high doses of vasopressors during that stay and subsequently developed gangrenous toes and severe debility. He was trached and PEGd during that stay(since removed) as well.  He has since been trying to rehab.     His wife states that he developed a fever of 103 yesterday and was transferred from rehab to local ER. He was started on Abx in ER but resp status worsened and he had to be intubated. He self extubated at some point and was re intubated. She also states that he was being transitioned from [facility] HD schedule to Tu,Th,Sat and may have missed a session. He is currently;y lying in bed intubated with family at bedside. He is awake and alert/followuing commands appropriately. He is on Norepi for BP support.     * Acute respiratory failure with  hypoxia    -Sputum culture NGTD.  -Appreciate ID hepl. Continue Cefepime.  -Considering thoracentesis.  -Palliative care to meet with family today regarding goals of care.           Fever    -Cultures NGTD.  -Continue Cefepime.        Heart transplant recipient    -S/P OHTX 2004.  -Immunosuppression: 4mg BID (goal 5-10), prednisone 2.5mg/day and MMF 250mg BID.    -Echo at baseline.         ESRD (end stage renal disease)    -Appreciate Neph Cx. Transition to SLED this am.  -Permacath in place.         Gangrene    - Dry gangrene developed previous admission with pressor use  - Continue with betadine rx as current.         Pleural effusion    -Repeat CT to review as above.         Type 1 diabetes mellitus with stage 3 chronic kidney disease    -Insulin sliding scale  -continue detemir        Uninterrupted Critical Care/Counseling Time (not including procedures): 45m    Tim Mao NP  Heart Transplant  [facility]

## 2018-06-18 NOTE — NURSING
HARRY Dumont, NP and DR Lloyd at [facility] -reviewed meds po/iv/crrt/resp status and recent VBG (and attempt at ABG), plan is to continue as current.  Verified goal for MAP is 55mmHg

## 2018-06-18 NOTE — ASSESSMENT & PLAN NOTE
Hx of CACHORRO on superimposed on CKD stage 4 secondary to prograft toxcity/ischemic ATN during last admission 3/11 now dialysis dependent via [facility] (since 3/14/2018) who is transferred to [facility] via air ambulance from [facility] in [place] for fever 103. Priro to arrival patient was intubated and started on Levo.  -L IJ permacath no overt signs of tunnel or exit site infection.   -Able to remove volume with SLED over weekend. Remains on Levo for pressure support. Very minimal hr intake-levo.   -Primary considering thoracentesis and will have goals of care discussion with wife today. Continue SLED for acidosis-Increase bicarb 35 to 40.  ml.  Discussed with primary team.       Anemia of CKD  -stable from compared to previous labs.   -No iron in setting of possible infection.  -Continue epo

## 2018-06-18 NOTE — SUBJECTIVE & OBJECTIVE
Interval History: Somnolent this am. Awakens with deep stimulation but drifts off to sleep. Wife Elizabeth called and made aware. Attempted to get ABG but unable. VBG with ph 7.1, CO2 45, Bicarb 15, Venous sat 43%. Elizabeth voiced that she does not want him re intubated if possible.     Continuous Infusions:   sodium chloride 0.9% 200 mL/hr at 06/18/18 0600    sodium chloride 0.9%      dexmedetomidine (PRECEDEX) infusion Stopped (06/16/18 0900)    norepinephrine bitartrate-D5W 0.1 mcg/kg/min (06/18/18 0600)     Scheduled Meds:   albuterol-ipratropium  3 mL Nebulization Q4H    aspirin  81 mg Oral Daily    ceFEPime (MAXIPIME) IVPB  1 g Intravenous Q8H    chlorhexidine  15 mL Mouth/Throat BID    enoxaparin  30 mg Subcutaneous Daily    epoetin jose miguel (PROCRIT) injection  100 Units/kg Intravenous Every Mon, Wed, Fri    escitalopram oxalate  20 mg Oral Daily    gabapentin  300 mg Oral TID    levothyroxine  137 mcg Oral Before breakfast    magnesium oxide  400 mg Oral Daily    mycophenolate mofetil  250 mg Oral BID    pantoprozole (PROTONIX) 40 mg/100 mL D5W IVPB  40 mg Intravenous Daily    polyethylene glycol  17 g Oral BID    pravastatin  40 mg Oral Daily    predniSONE  5 mg Oral Daily    QUEtiapine  50 mg Oral QHS    tacrolimus  4 mg Per G Tube Daily    tacrolimus  5 mg Per G Tube Daily     PRN Meds:acetaminophen, dextrose 50%, glucagon (human recombinant), insulin aspart U-100, lorazepam, magnesium sulfate IVPB, magnesium sulfate IVPB, ondansetron, sodium phosphate IVPB, sodium phosphate IVPB, sodium phosphate IVPB, sodium phosphate IVPB, sodium phosphate IVPB, sodium phosphate IVPB    Review of patient's allergies indicates:   Allergen Reactions    No known drug allergies      Objective:     Vital Signs (Most Recent):  Temp: 96.7 °F (35.9 °C) (06/18/18 0315)  Pulse: 109 (06/18/18 0741)  Resp: (!) 23 (06/18/18 0741)  BP: (!) 112/59 (06/18/18 0730)  SpO2: 100 % (06/18/18 0741) Vital Signs (24h Range):  Temp:   [96.7 °F (35.9 °C)-98.7 °F (37.1 °C)] 96.7 °F (35.9 °C)  Pulse:  [109-138] 109  Resp:  [19-34] 23  SpO2:  [93 %-100 %] 100 %  BP: ()/(42-67) 112/59     Patient Vitals for the past 72 hrs (Last 3 readings):   Weight   06/17/18 0300 74.2 kg (163 lb 9.3 oz)   06/16/18 1100 76.2 kg (167 lb 15.9 oz)   06/16/18 0300 76.2 kg (167 lb 15.9 oz)     Body mass index is 25.62 kg/m².      Intake/Output Summary (Last 24 hours) at 06/18/18 0834  Last data filed at 06/18/18 0600   Gross per 24 hour   Intake          5999.75 ml   Output             7894 ml   Net         -1894.25 ml       Hemodynamic Parameters:       Telemetry: ST    Physical Exam   Constitutional: He is oriented to person, place, and time. He appears lethargic. He is sleeping. He has a sickly appearance.   HENT:   Head: Normocephalic.   Eyes: Pupils are equal, round, and reactive to light.   Neck: Normal range of motion. Neck supple.   Cardiovascular: Normal rate and regular rhythm.    Pulmonary/Chest: Effort normal and breath sounds normal.   Abdominal: Soft. Bowel sounds are normal.   Musculoskeletal: Normal range of motion.   Neurological: He is oriented to person, place, and time. He appears lethargic.   Skin: Skin is warm and dry.   Psychiatric: He has a normal mood and affect. His behavior is normal.   Nursing note and vitals reviewed.      Significant Labs:  CBC:    Recent Labs  Lab 06/16/18  0358 06/17/18  0403 06/18/18  0408   WBC 5.19 3.28* 7.60   RBC 2.66* 2.45* 2.60*   HGB 7.8* 7.2* 7.7*   HCT 26.5* 24.6* 26.8*    148* 196   * 100* 103*   MCH 29.3 29.4 29.6   MCHC 29.4* 29.3* 28.7*     BNP:  No results for input(s): BNP in the last 168 hours.    Invalid input(s): BNPTRIAGELJEFFREYO  CMP:    Recent Labs  Lab 06/16/18  0358  06/17/18  0403 06/17/18  1400 06/17/18  2205 06/18/18  0408   *  134*  < > 158*  158* 149* 197* 110  110   CALCIUM 8.1*  8.1*  < > 8.2*  8.2* 8.4* 8.5* 8.3*  8.3*   ALBUMIN 2.1*  2.1*  < > 2.2*  2.2* 2.3*  2.3* 2.5*  2.5*   PROT 5.2*  --  5.4*  --   --  5.9*     142  < > 141  141 142 139 139  139   K 4.1  4.1  < > 4.3  4.3 4.2 4.0 3.7  3.7   CO2 24  24  < > 20*  20* 20* 13* 16*  16*     105  < > 105  105 105 105 105  105   BUN 7  7  < > 3*  3* 2* 4* 5*  5*   CREATININE 1.3  1.3  < > 0.6  0.6 0.6 0.9 1.1  1.1   ALKPHOS 137*  --  138*  --   --  150*   ALT 6*  --  7*  --   --  7*   AST 14  --  13  --   --  16   BILITOT 1.1*  --  0.7  --   --  0.5   < > = values in this interval not displayed.   Coagulation:   No results for input(s): PT, INR, APTT in the last 168 hours.  LDH:  No results for input(s): LDH in the last 72 hours.  Microbiology:  Microbiology Results (last 7 days)     Procedure Component Value Units Date/Time    Blood culture x two cultures. Draw prior to antibiotics. [[id number]] Collected:  06/15/18 1055    Order Status:  Completed Specimen:  Blood from Peripheral, Forearm, Right Updated:  06/17/18 1212     Blood Culture, Routine No Growth to date     Blood Culture, Routine No Growth to date     Blood Culture, Routine No Growth to date    Narrative:       Aerobic and anaerobic    Blood culture x two cultures. Draw prior to antibiotics. [[id number]] Collected:  06/15/18 1135    Order Status:  Completed Specimen:  Blood from Midline, Basilic, Left Updated:  06/17/18 1212     Blood Culture, Routine No Growth to date     Blood Culture, Routine No Growth to date     Blood Culture, Routine No Growth to date    Narrative:       Aerobic and anaerobic    Culture, Respiratory with Gram Stain [[id number]] Collected:  06/15/18 0067    Order Status:  Completed Specimen:  Respiratory from Endotracheal Aspirate Updated:  06/17/18 1048     Respiratory Culture No Growth     Gram Stain (Respiratory) <10 epithelial cells per low power field.     Gram Stain (Respiratory) Many WBC's     Gram Stain (Respiratory) No organisms seen          I have reviewed all pertinent labs within the past 24  hours.    Estimated Creatinine Clearance: 80.1 mL/min (based on SCr of 1.1 mg/dL).    Diagnostic Results:  I have reviewed and interpreted all pertinent imaging results/findings within the past 24 hours.

## 2018-06-18 NOTE — NURSING
Pt woke up more and was able to give mycophenolate and some jello PO.  SANFORD Suárez at bedside to visit.

## 2018-06-18 NOTE — PROGRESS NOTES
Pt resting ion bed. Pt confused today. Wife not at bedside. Per bedside nurse, wife coming later from [place]-unsure of time. Emilee has pal care number. Will follow.       Zulay REYES, APRN, AGCNS

## 2018-06-18 NOTE — PLAN OF CARE
Problem: Patient Care Overview  Goal: Plan of Care Review  Outcome: Ongoing (interventions implemented as appropriate)  Patient progressing towards discharge.  Patient had no acute events noted throughout the night at this time.  See Doc Flowsheets for documented results.       Infusions:levophed     Neurological:  Responds to voice.  Opens eyes on command.  Pupils equal and reactive. Pt confused, delirious and hallucinating. Pt states seeing people he doesn't know in the room. Mumbles to self frequently.     Cardiac:  ST.  Pulses weak, but palpable in all extremities.  Capillary refill < 3 seconds in all extremities.    Respiratory: 2L 02 NC, tolerating well.     Gastrointestinal: normoactive BS. Pt abd soft/nontender     Genitourinary: anuric     Endocrine:  Blood glucose monitoring. Insulin SS administered.     Musculoskeletal: MONZON.      Integumentary/Other: Patient repositioned every 2 hours throughout the night. Bilateral heel protectors and SCDs on. Pt on CRRT, SCUF, at goal 400.     POC: fluid removal via CRRT. Palliative care to see patient and family in am.      Discussed plan of care with patient and family with verbalization of understanding.  Will continue to monitor.

## 2018-06-18 NOTE — PROGRESS NOTES
Spoke to Tim Mao, NETTIE. Pal care consulted for goals of care/support. Per Tim, pt's wife will be visiting after she attends her child's game.     Zulay REYES, APRN, AGCNS

## 2018-06-18 NOTE — ASSESSMENT & PLAN NOTE
-S/P OHTX 2004.  -Immunosuppression: 4mg BID (goal 5-10), prednisone 2.5mg/day and MMF 250mg BID.    -Echo at baseline.

## 2018-06-18 NOTE — SUBJECTIVE & OBJECTIVE
Interval History: Somnolent this am. Awakens with stimulation then drifts off to sleep. Wife called and made aware. Unable to obtain arterial ABG. VBG obtained and ph 7.1, pCO2 48 venous sat 43% and Bicarb 15. Elizabeth voiced that she does not want intubation.    Continuous Infusions:   sodium chloride 0.9% 200 mL/hr at 06/18/18 0600    sodium chloride 0.9%      dexmedetomidine (PRECEDEX) infusion Stopped (06/16/18 0900)    norepinephrine bitartrate-D5W 0.1 mcg/kg/min (06/18/18 0600)     Scheduled Meds:   albuterol-ipratropium  3 mL Nebulization Q4H    aspirin  81 mg Oral Daily    ceFEPime (MAXIPIME) IVPB  1 g Intravenous Q8H    chlorhexidine  15 mL Mouth/Throat BID    enoxaparin  30 mg Subcutaneous Daily    epoetin jose miguel (PROCRIT) injection  100 Units/kg Intravenous Every Mon, Wed, Fri    escitalopram oxalate  20 mg Oral Daily    gabapentin  300 mg Oral TID    levothyroxine  137 mcg Oral Before breakfast    magnesium oxide  400 mg Oral Daily    mycophenolate mofetil  250 mg Oral BID    pantoprozole (PROTONIX) 40 mg/100 mL D5W IVPB  40 mg Intravenous Daily    polyethylene glycol  17 g Oral BID    pravastatin  40 mg Oral Daily    predniSONE  5 mg Oral Daily    QUEtiapine  50 mg Oral QHS    tacrolimus  4 mg Per G Tube Daily    tacrolimus  5 mg Per G Tube Daily     PRN Meds:acetaminophen, dextrose 50%, glucagon (human recombinant), insulin aspart U-100, lorazepam, magnesium sulfate IVPB, magnesium sulfate IVPB, ondansetron, sodium phosphate IVPB, sodium phosphate IVPB, sodium phosphate IVPB, sodium phosphate IVPB, sodium phosphate IVPB, sodium phosphate IVPB    Review of patient's allergies indicates:   Allergen Reactions    No known drug allergies      Objective:     Vital Signs (Most Recent):  Temp: 96.7 °F (35.9 °C) (06/18/18 0315)  Pulse: 109 (06/18/18 0741)  Resp: (!) 23 (06/18/18 0741)  BP: (!) 112/59 (06/18/18 0730)  SpO2: 100 % (06/18/18 0741) Vital Signs (24h Range):  Temp:  [96.7 °F (35.9  °C)-98.7 °F (37.1 °C)] 96.7 °F (35.9 °C)  Pulse:  [109-138] 109  Resp:  [19-34] 23  SpO2:  [93 %-100 %] 100 %  BP: ()/(42-67) 112/59     Patient Vitals for the past 72 hrs (Last 3 readings):   Weight   06/17/18 0300 74.2 kg (163 lb 9.3 oz)   06/16/18 1100 76.2 kg (167 lb 15.9 oz)   06/16/18 0300 76.2 kg (167 lb 15.9 oz)     Body mass index is 25.62 kg/m².      Intake/Output Summary (Last 24 hours) at 06/18/18 0824  Last data filed at 06/18/18 0600   Gross per 24 hour   Intake          5999.75 ml   Output             7894 ml   Net         -1894.25 ml          Physical Exam   Constitutional: He appears lethargic. He is sleeping.   HENT:   Head: Normocephalic.   Eyes: Pupils are equal, round, and reactive to light.   Neck: Neck supple. No JVD present.   Cardiovascular: S1 normal, S2 normal and normal heart sounds.  Exam reveals no friction rub.    No murmur heard.  Pulmonary/Chest: Effort normal. No respiratory distress. He has no wheezes. He has rales.   Abdominal: Soft. Bowel sounds are normal. He exhibits no distension. There is no tenderness.   Neurological: He appears lethargic.       Significant Labs:  CBC:    Recent Labs  Lab 06/16/18  0358 06/17/18  0403 06/18/18  0408   WBC 5.19 3.28* 7.60   RBC 2.66* 2.45* 2.60*   HGB 7.8* 7.2* 7.7*   HCT 26.5* 24.6* 26.8*    148* 196   * 100* 103*   MCH 29.3 29.4 29.6   MCHC 29.4* 29.3* 28.7*     BNP:  No results for input(s): BNP in the last 168 hours.    Invalid input(s): BNPTRIAGELBLO  CMP:    Recent Labs  Lab 06/16/18  0358  06/17/18  0403 06/17/18  1400 06/17/18  2205 06/18/18  0408   *  134*  < > 158*  158* 149* 197* 110  110   CALCIUM 8.1*  8.1*  < > 8.2*  8.2* 8.4* 8.5* 8.3*  8.3*   ALBUMIN 2.1*  2.1*  < > 2.2*  2.2* 2.3* 2.3* 2.5*  2.5*   PROT 5.2*  --  5.4*  --   --  5.9*     142  < > 141  141 142 139 139  139   K 4.1  4.1  < > 4.3  4.3 4.2 4.0 3.7  3.7   CO2 24  24  < > 20*  20* 20* 13* 16*  16*     105  <  > 105  105 105 105 105  105   BUN 7  7  < > 3*  3* 2* 4* 5*  5*   CREATININE 1.3  1.3  < > 0.6  0.6 0.6 0.9 1.1  1.1   ALKPHOS 137*  --  138*  --   --  150*   ALT 6*  --  7*  --   --  7*   AST 14  --  13  --   --  16   BILITOT 1.1*  --  0.7  --   --  0.5   < > = values in this interval not displayed.   Coagulation:   No results for input(s): PT, INR, APTT in the last 168 hours.  LDH:  No results for input(s): LDH in the last 72 hours.  Microbiology:  Microbiology Results (last 7 days)     Procedure Component Value Units Date/Time    Blood culture x two cultures. Draw prior to antibiotics. [[id number]] Collected:  06/15/18 1055    Order Status:  Completed Specimen:  Blood from Peripheral, Forearm, Right Updated:  06/17/18 1212     Blood Culture, Routine No Growth to date     Blood Culture, Routine No Growth to date     Blood Culture, Routine No Growth to date    Narrative:       Aerobic and anaerobic    Blood culture x two cultures. Draw prior to antibiotics. [[id number]] Collected:  06/15/18 1135    Order Status:  Completed Specimen:  Blood from Midline, Basilic, Left Updated:  06/17/18 1212     Blood Culture, Routine No Growth to date     Blood Culture, Routine No Growth to date     Blood Culture, Routine No Growth to date    Narrative:       Aerobic and anaerobic    Culture, Respiratory with Gram Stain [[id number]] Collected:  06/15/18 0177    Order Status:  Completed Specimen:  Respiratory from Endotracheal Aspirate Updated:  06/17/18 1048     Respiratory Culture No Growth     Gram Stain (Respiratory) <10 epithelial cells per low power field.     Gram Stain (Respiratory) Many WBC's     Gram Stain (Respiratory) No organisms seen        I have reviewed all pertinent labs within the past 24 hours.    Estimated Creatinine Clearance: 80.1 mL/min (based on SCr of 1.1 mg/dL).    Diagnostic Results:  I have reviewed and interpreted all pertinent imaging results/findings within the past 24 hours.

## 2018-06-18 NOTE — NURSING
SW Dr Lloyd and Akash, Pharm D at bedside - I nfomred that this RN is still not able to arouse pt enough to safely swallow po/pills.  THey will assess his meds and change what is needed to IV or PIV.  Awaiting orders - none noted yet

## 2018-06-19 NOTE — PROGRESS NOTES
Pharmacy called x2 regarding morphine infusion to initiate WOC.  Medication was due at 0515, has not yet been received yet.  Per Dr. Mcneil, administer 2mg morphine IV push now.

## 2018-06-19 NOTE — PLAN OF CARE
Problem: Patient Care Overview  Goal: Plan of Care Review  Outcome: Ongoing (interventions implemented as appropriate)  Pt did not tolerate CRRT when restarted at 1900. Pt required increase in pressure support with levo despite UF at 200. POC reviewed with family by MARIA DOLORES ABRAHAM and decided to make pt DNR and stop CRRT when all of family arrived. Pt was made comfort care and started on Morphine gtt at 0600 and levo d'cd. VS and assessment per flow sheet, plan of care reviewed with Lv Crocker and family, all concerns addressed, will continue to monitor.

## 2018-06-19 NOTE — PROGRESS NOTES
Called to patient bedside, patient had withdrawal of care orders placed earlier this morning, with patient passing away at 6:34AM. I was called to bedside, pupils are nonreactive to light. No heart sounds or respiratory sounds. No spontaneous respirations. Asystole from cardiac monitor. Patient pronounced at 6:34am. Wife and extended family at bedside.     Kanika Ferreira MD  [facility] Staff

## 2018-06-19 NOTE — PROGRESS NOTES
Seen and examined patient on CRRT. Noted increased levophed requirements to 0.68 from starting this evening and that the patient has shown no improvement on it. In addition had to decrease UF to 200. His pupils are reactive but sluggish. However, since the patient has not tolerated CRRT even at low doses will discuss with the primary team tomorrow about initiation of palliative measures.     Mikey Mcneil MD, PGY-4

## 2018-06-19 NOTE — DISCHARGE SUMMARY
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 Recent long hospitalization due to septic shock, RSV who presents from inpatient rehab with fever and acute resp failure.He was recently admitted from rehab for fevers and was treated with course of IV abx and transferred back to rehab after symptoms resolved. Prior to that admit, he had a prolonged hospital stay secondary to FUO and respiratory failure. He was treated with empiric abx and antifungals. He required high doses of vasopressors during that stay and subsequently developed gangrenous toes and severe debility. He was trached and PEGd during that stay(since removed) as well.  He has since been trying to rehab.      His wife states that he developed a fever of 103 yesterday and was transferred from rehab to local ER. He was started on Abx in ER but resp status worsened and he had to be intubated. He self extubated at some point and was re intubated. She also states that he was being transitioned from [facility] HD schedule to Tu,Th,Sat and may have missed a session. He is currently;y lying in bed intubated with family at bedside. He is awake and alert/followuing commands appropriately. He is on Norepi for BP support.    * No surgery found *     Hospital Course: He was started on broad spectrum Abx and Neph was consulted for CRRT. He was placed on CRRT and initially did well. He was extubated successfully. Unfortunately his mental status deteriorated and he required  more vasopressor support. Pt/Wife/family expressed that they wanted palliative care on board. He was made DNR and eventually was transitioned to comfort care. He passed away on 28 at 6:34AM with family at bedside.     Consults         Status Ordering Provider     Inpatient consult to Infectious Diseases  Once     Provider:  (Not yet assigned)    Completed PONCHO CARLSON     Inpatient consult to Nephrology  Once     Provider:  (Not yet assigned)    Completed ARLIN JASON     Inpatient consult to Palliative Care  Once     Provider:  Frankie Diane MD    Completed OLLIE PETERS      Inpatient consult to PICC team (\[facility]\"")  Once     Provider:  (Not yet assigned)    Completed ARLIN JASON     Inpatient consult to Registered Dietitian/Nutritionist  Once     Provider:  (Not yet assigned)    Completed TIM CHRISTINE        Pending Diagnostic Studies:     Procedure Component Value Units Date/Time    Lactic acid, plasma #2 [[id number]] Collected:  06/15/18 1809    Order Status:  Sent Lab Status:  In process Updated:  06/15/18 1809    Specimen:  Blood from Blood         Final Active Diagnoses:    Diagnosis Date Noted POA    PRINCIPAL PROBLEM:  Acute respiratory failure with hypoxia [J96.01] 2018 Yes    Fever [R50.9] 2018 Yes    Heart transplant recipient [Z94.1] 06/15/2018 Not Applicable    ESRD (end stage renal disease) [N18.6] 2018 Yes    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  Medications:  None (patient  at medical facility)    Tim Christine NP  Heart Transplant  [facility]

## 2018-06-19 NOTE — PT/OT/SLP DISCHARGE
Physical Therapy Discharge Summary    Name: Lv Crocker  MRN: [id number]   Principal Problem: Acute respiratory failure with hypoxia     Patient Discharged from acute Physical Therapy on 2018.     Assessment:     Patient is no longer making progress.    Objective:     GOALS:    Physical Therapy Goals     Not on file                Reasons for Discontinuation of Therapy Services  Pt          Teresa Dudley PT, DPT  2018  [phone]

## 2018-06-19 NOTE — NURSING
Pt passed prior to change of shift.  Dr Baker at pod completing death note/paperwork.  PM RN had stated that he called [facility] but  Since pt was not on vent, referral was not started. This RN will call [facility]. Family visiting.

## 2018-06-19 NOTE — PLAN OF CARE
After discussions with the family about the increasing need for increasing his levophed now at 0.7 mcg/kg/min, even running at net even. The patient's wife Elizabeth would like to stop CRRT and make the patient comfortable. The patient's wife is aware of the the fact that the patient has not been tolerating dialysis since restarting it tonight at 7:00 pm. DNR form is signed. Indicated that they would wait on stopping CRRT and initiating palliative measures till patient's brother is here. Will wait to stop CRRT and wait to wean down on the levophed till the patient's brother is here.     Discussed with Dr. Ferreira,    Mikey Mcneil MD, PGY-4      Addendum:  Discussed again the plan of care with Patient's wife and she is in agreement. Waiting on brother. Plan is to stop the CRRT and wean down on the levophed (noted that it will be a couple of hours till patient's brother is here) and hold on palliative care measures (ie comfort care till AM primary team).     01:28 AM.

## 2018-06-19 NOTE — PT/OT/SLP PROGRESS
Speech Language Pathology  Discharge Summary      Lv Crocker  MRN: [id number]    SLP orders acknowledged and discontinued. ST orders discharged 2/2 Other ( Patient .)    JOSELYN Delarosa., [facility]-SLP  Speech-Language Pathology  2018

## 2018-06-19 NOTE — PROGRESS NOTES
Informed [facility] and [facility] of pt's death so they are aware pt will not be returning to their care.

## 2018-06-19 NOTE — PT/OT/SLP DISCHARGE
Occupational Therapy Discharge Summary    Lv Crocker  MRN: [id number]   Principal Problem: Acute respiratory failure with hypoxia      Patient Discharged from acute Occupational Therapy on 2018  .  Please refer to prior OT note dated 18 for functional status.    Assessment:      pt  this AM    Objective:     GOALS:    Occupational Therapy Goals     Not on file          Multidisciplinary Problems (Resolved)        Problem: Occupational Therapy Goal    Goal Priority Disciplines Outcome Interventions   Occupational Therapy Goal   (Resolved)     OT, PT/OT Outcome(s) achieved    Description:  Goals to be met by:  7 days 18    Patient will increase functional independence with ADLs by performing:    Pt/fly to demo independence with UE HEP to increase functional strength/ROM  Pt to complete basic g/h skills with MIN A in supported sitting.                         Reasons for Discontinuation of Therapy Services  pt  this AM      Plan:   Pt  this AM   AMADOU Villanueva  2018

## 2018-06-19 NOTE — PROGRESS NOTES
Dr. Ferreira with [facility] called to express patient's spouse wishes to initiate withdrawal of care process.  Currently, WOC order set is not yet initiated, patient is off of CRRT, all family is present at bedside, patient is currently showing signs of air hunger with oxygen saturation 86%.  Patient's spouse, Elizabeth, does not wish to have vasoactive medication on and wants to keep the patient pain free and comfortable during this end of life transition.  Per Dr. Ferreira, initiate WOC order set including morphine infusion for pain management and ativan pushes.

## 2018-06-19 NOTE — NURSING
0030- Dr. Mcneil paged and asked to come to bedside regarding pt's condition. Dr. Mcneil spoke with Family regarding code status and plan of care. He addressed any concerns and questions. DNR paperwork completed. Family states they would like to make the patient comfortable. Dr. Mcneil notified and discussing plan of care with family  0100. Waiting for Pt's brother to arrive before discontinuing CRRT.    0330- Pt's Brother at bedside, Dr. Mcneil notified. Pt now on 1 mcg/kg/min levo, Map at 56. CRRT stopped.     0430- Family requesting meds to ease patients pain and do not want pt to suffer any longer. Dr. Mcneil notified of family's wishes. Will come to bedside shortly.    0555- Comfort care initiated.    0634- Pt pronounced by Dr. Ferreira. Family at bedside.  at bedside.

## 2018-06-20 LAB
BACTERIA BLD CULT: NORMAL
BACTERIA BLD CULT: NORMAL

## 2018-06-20 NOTE — DISCHARGE SUMMARY
Preliminary cause of death: multiorgan failure secondary to sepsis in setting of heart transplant, ESRD.    Kanika Ferreira MD  \[facility] [place] [facility]\"" Staff

## 2018-06-21 NOTE — PHYSICIAN QUERY
PT Name: Lv Crocker  MR #: [id number]     Physician Query Form - Documentation Clarification      CDS/: Asad Yang Jr, RN              Contact information:[phone]    This form is a permanent document in the medical record.     Query Date: June 21, 2018    By submitting this query, we are merely seeking further clarification of documentation. Please utilize your independent clinical judgment when addressing the question(s) below.    The Medical record reflects the following:    Supporting Clinical Findings Location in Medical Record   Diagnoses of Respiratory failure, unspecified chronicity, unspecified whether with hypoxia or hypercapnia, CHF (congestive heart failure), and Acute respiratory failure with hypoxia were also pertinent to this visit.    CONCLUSIONS     1 - Post-cardiac transplantation study.     2 - Normal left ventricular systolic function (EF 55-60%).     3 - Normal right ventricular systolic function .     4 - Normal left ventricular diastolic function.     5 - No wall motion abnormalities.     Acute respiratory failure with hypoxia    Musculoskeletal: He exhibits edema (3 +BLE). He exhibits no tenderness.     Improving alveolar pulmonary edema when compared to previous exam.    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 6/15 ED Provider Note          6/15 2D Echo                6/15 H&P    6/15 Nephrology Consult      6/16 Chest X Ray Report      6/16 Heart Transplant Progress Note      6/17 Heart Transplant Progress Note    6/19 Discharge Summary                                                                                      Doctor, Please specify diagnosis or diagnoses associated with above clinical findings.  Specify the Status  of the Congestive Heart Failure Noted by the ED Provider    Provider Use Only    (    )The Patient Has No evidence of Congestive Heart Failure    (  x  )Acute Diastolic Heart Failure    (    )Other_____________________________________________________                                                                                                           [  ] Clinically undetermined

## 2018-06-22 ENCOUNTER — PATIENT OUTREACH (OUTPATIENT)
Dept: ADMINISTRATIVE | Facility: HOSPITAL | Age: 43
End: 2018-06-22

## 2018-07-05 LAB — FUNGUS SPEC CULT: NORMAL

## 2018-08-03 LAB
ACID FAST MOD KINY STN SPEC: NORMAL
MYCOBACTERIUM SPEC QL CULT: NORMAL

## 2019-01-26 NOTE — PT/OT/SLP PROGRESS
Occupational Therapy      Patient Name:  Lv Crocker   MRN:  [id number]    Patient not seen today secondary to Dialysis (OT unable to return in PM for 2nd attempt). Will follow-up next scheduled OT session.    Leonor Wagner, OT  5/9/2018   Patient is seen and examined at bedside. Denies CP/SOB/Dizziness/N/V/D/HA. Pain is controlled.   Packing/Dressing placed in ED last night with Dr. Whipple after bedside I+D.   This am -- ace/dressing slide down distally around patient's knee.     Vital Signs Last 24 Hrs  T(C): 36.9 (26 Jan 2019 05:08), Max: 37 (25 Jan 2019 20:47)  T(F): 98.5 (26 Jan 2019 05:08), Max: 98.6 (25 Jan 2019 20:47)  HR: 78 (26 Jan 2019 05:08) (72 - 112)  BP: 108/72 (26 Jan 2019 05:08) (100/67 - 118/72)  BP(mean): --  RR: 16 (26 Jan 2019 05:08) (16 - 19)  SpO2: 94% (26 Jan 2019 05:08) (92% - 98%)    GEN: NAD  LLE: Dressing down around knee. Packing slide out about ~2in from initial placement.   Packing was not advanced or adjusted -- is in stable position at this time.   Placed 4x4s and tegaderms around the 3 proximal sides of the dressing, the most distal aspect of dressing left open to allow drainage. Ace wrap placed overlying.   Motor intact + EHL/FHL/TA/GS in the BL LE. Sensation is grossly intact distal . Extremity warm. Compartments are soft. DP 1+    Labs:                          9.7    11.88 )-----------( 371      ( 25 Jan 2019 10:39 )             30.0       01-25    127<L>  |  92<L>  |  22  ----------------------------<  117<H>  4.3   |  25  |  0.85    Ca    9.3      25 Jan 2019 10:39    TPro  7.2  /  Alb  2.2<L>  /  TBili  1.2  /  DBili  x   /  AST  67<H>  /  ALT  59  /  AlkPhos  176<H>  01-25      Pt is a 69y Male with a L Hip Recurrent Surgical Site Infection s/p bedside I+D/packing     -Dsg reinforced this am as noted above -- managed by plastics   -WB as tolerated affected extremity  -XR L Hip: s/p ORIF of periprosthetic fracture of gabriela-arthroplasty. No acute fracture.  -DVT PPx per medical team.   -Please document medical optimization for OR if required.   -Will DW Dr. Ramirez and advise of any changes.

## 2019-05-23 NOTE — PT/OT/SLP PROGRESS
INR 2.07. NO DOSAGE CHANGE AND RECHECK LEVEL IN 1 WEEK PER SHELLI HENDRICKSON, PAC. VERBAL ORDERS READ BACK AND VERIFIED BY SHELLI HENDRICKSON PAC. PATIENT AWARE VERBALIZED OK. PH,LPN   Physical Therapy  Co-Treatment with OT    Patient Name:  Lv Crocker   MRN:  [id number]    Recommendations:     Discharge Recommendations:  LTACH (long term acute care hospital)   Discharge Equipment Recommendations:  (will determine DME needs closer to discharge.)   Barriers to discharge: Decreased caregiver support Family will not be able to care for pt at current functional level.     Assessment:     Lv Crocker is a 42 y.o. male admitted with a medical diagnosis of Acute respiratory failure with hypoxia.  He presents with the following impairments/functional limitations:  weakness, impaired functional mobilty, gait instability, impaired balance, decreased lower extremity function, impaired cognition, impaired coordination pt nate treatment fair but cont to need total assist for functional mobility. Pt will benefit from cont skilled PT to progress physically. Pt will need LTAC placement when medically stable.     Rehab Prognosis:  fair; patient would benefit from acute skilled PT services to address these deficits and reach maximum level of function.      Recent Surgery: Procedure(s) (LRB):  TRACHEOSTOMY (N/A)  BRONCHOSCOPY-OPERATIVE,FLEXIBLE (N/A)  LAVAGE-ALVEOLAR (N/A) 1 Day Post-Op    Plan:     During this hospitalization, patient to be seen 5 x/week to address the above listed problems via gait training, therapeutic activities, therapeutic exercises, neuromuscular re-education  · Plan of Care Expires:  04/23/18   Plan of Care Reviewed with: patient, spouse    Subjective     Communicated with nurse prior to session.  Patient found supine upon PT entry to room, agreeable to treatment.      Chief Complaint: pt had no complaints during treatment.   Patient comments/goals:  Family goals:pt to return to previous functional level.   Pain/Comfort:  · Pain Rating 1: 0/10  · Pain Rating Post-Intervention 1: 0/10    Patients cultural, spiritual, Cheondoism conflicts given the current situation:  none    Objective:     Patient found with: telemetry, arterial line, pulse ox (continuous), blood pressure cuff, ventilator, tracheostomy, PICC line, SCD, peripheral IV (CVP, dialysis R IJ drain)     General Precautions: Standard, fall   Orthopedic Precautions:N/A   Braces:       Functional Mobility:  · Bed Mobility:     · Rolling Right: total assistance and of 2 persons  · Supine to Sit: total assistance and of 2 persons  ·   · Balance: pt sat on EOB x 8 min with total assist for static sitting balance and total assist for head control. OT performed BUE there exer with pt.       AM-PAC 6 CLICK MOBILITY  Turning over in bed (including adjusting bedclothes, sheets and blankets)?: 2  Sitting down on and standing up from a chair with arms (e.g., wheelchair, bedside commode, etc.): 1  Moving from lying on back to sitting on the side of the bed?: 2  Moving to and from a bed to a chair (including a wheelchair)?: 1  Need to walk in hospital room?: 1  Climbing 3-5 steps with a railing?: 1  Total Score: 8         Patient left supine with all lines intact, call button in reach and wife. present..    GOALS:    Physical Therapy Goals        Problem: Physical Therapy Goal    Goal Priority Disciplines Outcome Goal Variances Interventions   Physical Therapy Goal     PT/OT, PT Ongoing (interventions implemented as appropriate)     Description:  Goals to be met by: 18     Patient will increase functional independence with mobility by performin. Supine to sit with Moderate Assistance - not met  2. Sit to supine with Moderate Assistance - not met  3. Rolling to Left and Right with Minimal Assistance. - not met  4. Sit to stand transfer with Moderate Assistance - not met  5. Bed to chair transfer with Maximum Assistance - not met  6. Gait  x 20 feet with Minimal Assistance. - not met                       Time Tracking:     PT Received On: 18  PT Start Time: 915     PT Stop Time: 933  PT Total Time (min): 18 min      Billable Minutes: Therapeutic Activity 18 min    Treatment Type: Other (see comments) (co-treatment with OT)  PT/PTA: PT     PTA Visit Number: 0     Josette Nowak, PT  03/29/2018

## 2019-08-05 NOTE — NURSING
Rounds Report: Attended interdisciplinary rounds this morning with the transplant team including SW, physicians, fellows,  mid-level providers, and transplant coordinators.  Discussed plan of care, including DC date of this Friday, per request of pt due to pt's anniversary.Currently on IV antibiotics per ID recommendations. Pulmonary following to do thoracentesis today of RLL effusion.      Walk in

## 2019-08-17 NOTE — ED NOTES
"   08/16/19 1900   Group 6   Start Time 1800   Stop Time 1855   Length (min) 55 min   Group Name Self-awareness in group context   Focus of Group Process   Attendance Present   Mood Other  (appropriate to content)   Affect Calm;Relaxed   Behavior/Socialization Cooperative;Engaged   Thought Process [place] thinking; Focused   Patient Response Attentive;Good eye contact; Interactive   Task Performance Follows directions   Group Notes Patient engaged in group activity involving stacking styrofoam cups with peers and reflected on parallels between the activity and her personal patterns of personality. Patient shared how she took over a peer's role without realizing it and reflected on how she often ""takes the reins\"" on matter in her family as well.         " Pt laying in bed in nad, atbx infusing via pump with no signs of complications noted to site.

## 2019-11-24 NOTE — ANESTHESIA POSTPROCEDURE EVALUATION
Anesthesia Post Evaluation    Patient: Lv Crocker    Procedure(s) Performed: Procedure(s) (LRB):  ESOPHAGOGASTRODUODENOSCOPY (EGD) (N/A)    Final Anesthesia Type: MAC  Patient location during evaluation: GI PACU  Patient participation: Yes- Able to Participate  Level of consciousness: awake and alert and oriented  Post-procedure vital signs: reviewed and stable  Pain management: adequate  Airway patency: patent  PONV status at discharge: No PONV  Anesthetic complications: no      Cardiovascular status: blood pressure returned to baseline  Respiratory status: unassisted, room air and spontaneous ventilation  Hydration status: euvolemic  Follow-up not needed.        Visit Vitals    BP (!) 84/44 (BP Location: Left arm, Patient Position: Lying, BP Method: Automatic)    Pulse 88    Temp 36.9 °C (98.4 °F) (Oral)    Resp 18    SpO2 96%       Pain/Jose Score: Pain Assessment Performed: Yes (3/13/2017 10:11 AM)  Presence of Pain: denies (3/13/2017 11:20 AM)  Jose Score: 10 (3/13/2017 11:20 AM)       Catheter inserted.

## 2020-01-16 NOTE — PROGRESS NOTES
"[facility]  Podiatry  Progress Note    Patient Name: Lv Crocker  MRN: [id number]  Admission Date: 3/11/2018  Hospital Length of Stay: 44 days  Attending Physician: Behzad Lara Jr.,*  Primary Care Provider: Philippe Mohr MD   Interval Hx: Patient Seen in dialysis . Patient complains of pain to toes. States he is able to participate in PT. Heel protector boots in place.     Scheduled Meds:   sodium chloride 0.9%   Intravenous Once    sodium chloride 0.9%   Intravenous Once    acetaminophen  999.0148 mg Per G Tube TID    cetirizine  5 mg Per G Tube Daily    chlorhexidine  15 mL Mouth/Throat BID    epoetin jose miguel (PROCRIT) injection  4,000 Units Intravenous Every Mon, Wed, Fri    escitalopram oxalate  20 mg Per G Tube Daily    famotidine  20 mg Per G Tube QHS    heparin (porcine)  5,000 Units Subcutaneous Q12H    levothyroxine  75 mcg Intravenous Daily    midodrine  10 mg Oral TID    polyethylene glycol  17 g Oral BID    predniSONE  5 mg Per G Tube Daily    QUEtiapine  50 mg Oral QHS    tacrolimus  1 mg Sublingual BID     Continuous Infusions:   insulin (HUMAN R) infusion (adults) 1.2 Units/hr (04/25/18 1013)     PRN Meds:sodium chloride 0.9%, sodium chloride 0.9%, heparin (porcine), insulin aspart U-100, midodrine, ondansetron, oxyCODONE, povidone-iodine, ramelteon    Review of patient's allergies indicates:   Allergen Reactions    No known drug allergies         Past Medical History:   Diagnosis Date    Anxiety     Arthritis     Ascites     Thought to be 2/2 heart tpx; liver bx 3/31/17 w/o significant fibrosis    Cardiomyopathy     Depression     Controlled "for the most part" on Lexipro    Encounter for blood transfusion     during transplant    GERD (gastroesophageal reflux disease)     Hashimoto's disease     History of CHF (congestive heart failure)     Previously EF 20% (prior to heart transplant); last EF 60%, PAP 41 as of 12/12/17; throught to be " PHYSICAL THERAPY NOTE          Patient Name: Shasha PARKERCV'R Date: 1/16/2020 01/16/20 0931   Pain Assessment   Pain Score No Pain   Restrictions/Precautions   Other Precautions   (O2;Fall Risk;Pain; Chair Alarm)   Subjective   Subjective Feeling much better today  Transfers   Sit to Stand 5  Supervision   Additional items Bedrails   Stand to Sit 5  Supervision   Additional items Bedrails   Stand pivot 5  Supervision   Ambulation/Elevation   Gait pattern Short stride   Gait Assistance 5  Supervision   Additional items Assist x 1   Assistive Device Rolling walker   Distance 170'   Stair Management Assistance 5  Supervision   Additional items Assist x 1;Verbal cues   Stair Management Technique One rail L;Step to pattern; Foreward   Number of Stairs 5   Endurance Deficit   Endurance Deficit Yes   Activity Tolerance   Activity Tolerance Patient tolerated treatment well   Assessment   Prognosis Good   Problem List Decreased strength;Decreased endurance; Impaired balance;Decreased mobility   Assessment Pt  ambulating with supervison utilizing RW  Improving endurance  Able to ambulate 5 steps without difficulty  Limited # conserving energy for return trip ambulating back to her room  Goals   LTG Expiration Date 01/27/20   Plan   Treatment/Interventions Functional transfer training;LE strengthening/ROM; Therapeutic exercise; Endurance training;Elevations; Bed mobility;Gait training   Progress Progressing toward goals   Recommendation   Recommendation   (home PT)   Pt  Seated EOB   with call bell within reach at end of PT session  Discussed with Darrin Holcomb PT today's treatment and patient's current level of function for care coordination  2/2 genetics & DM1    History of coronary artery disease     H/o Coronary artery disease; resolved since heart transplant 2014    History of myocardial infarction     h/o MI x3 prior to heart transplant    HLD (hyperlipidemia) 6/11/2015    Hx of psychiatric care     Hypoglycemia unawareness in type 1 diabetes mellitus     Hypothyroid     Initially hyperthyroid 2/2 Hoshimoto's thyroiditis; now on levothyroxine 150 mcg qd    Pleural effusion due to another disorder     Thought to be 2/2 heart tpx; s/p PleuRx catheter placement and removal after 1-2 months    Psychiatric problem     Pulmonary hypertension assoc with unclear multi-factorial mechanisms 12/12/2017    PAP 41 (EF 60%) on ECHO 12/12/17    Syncope 6/30/2015    Type I diabetes mellitus, well controlled     Well controlled; Dx'd @7y/o; on N insulin 20U BID & Humalog 10U w/meals +SSI; Glu 89 11/9/17; A1c 7.2% 4/5/17    Unspecified essential hypertension 05/29/2014    Well controlled; Last /57 on 11/9/17     Past Surgical History:   Procedure Laterality Date    CARDIAC CATHETERIZATION      CARDIAC SURGERY      CHOLECYSTECTOMY      COLONOSCOPY N/A 3/13/2018    Procedure: COLONOSCOPY;  Surgeon: Tim Spencer MD;  Location: [facility] ([address] [place] [address]);  Service: Endoscopy;  Laterality: N/A;    CORONARY ANGIOPLASTY      FOOT SPLIT TRANSFER OF THE POSTERIOR TIBIALIS TENDON PROCEDURE      HEART TRANSPLANT  2014    KNEE SURGERY      PleuRx catheter placement  01/11/2018    Plan for removal after 1-2 months by Dr. James in cardiothoracic surgery    s/p oht  November 2014    stent placemnet         Family History     Problem Relation (Age of Onset)    Cancer Brother (50)    Diabetes Mother, Father, Brother, Son, Sister, Maternal Uncle, Paternal Aunt, Maternal Grandmother, Maternal Grandfather    Heart disease Mother, Brother    Hypertension Mother, Father, Brother    Kidney disease Mother, Father    Stroke Father, Brother    Vision loss  Brother        Social History Main Topics    Smoking status: Never Smoker    Smokeless tobacco: Never Used    Alcohol use No    Drug use: No    Sexual activity: Yes     Partners: Female     Review of Systems   Unable to perform ROS: Mental status change     Objective:     Vital Signs (Most Recent):  Temp: 97.9 °F (36.6 °C) (04/25/18 0840)  Pulse: 105 (04/25/18 1015)  Resp: 18 (04/25/18 1015)  BP: 114/66 (04/25/18 1015)  SpO2: 100 % (04/25/18 1015) Vital Signs (24h Range):  Temp:  [97.6 °F (36.4 °C)-98.5 °F (36.9 °C)] 97.9 °F (36.6 °C)  Pulse:  [100-108] 105  Resp:  [18-20] 18  SpO2:  [95 %-100 %] 100 %  BP: ()/(51-66) 114/66     Weight: 75.9 kg (167 lb 5.3 oz)  Body mass index is 26.21 kg/m².    Foot Exam    General  Orientation: disoriented       Right Foot/Ankle     Inspection and Palpation  Tenderness: great toe metatarsophalangeal joint   Skin Exam: skin changes and abnormal color;     Neurovascular  Dorsalis pedis: 1+  Posterior tibial: 1+  Saphenous nerve sensation: diminished  Tibial nerve sensation: diminished  Superficial peroneal nerve sensation: diminished  Deep peroneal nerve sensation: diminished  Sural nerve sensation: diminished      Left Foot/Ankle      Inspection and Palpation  Tenderness: great toe interphalangeal joint and great toe metatarsophalangeal joint   Skin Exam: skin changes;     Neurovascular  Dorsalis pedis: 1+  Posterior tibial: 1+  Saphenous nerve sensation: diminished  Tibial nerve sensation: diminished  Superficial peroneal nerve sensation: diminished  Deep peroneal nerve sensation: diminished  Sural nerve sensation: diminished          Gangrene noted to hallux and 3rd toe. Stable no purulent drainage noted. Ischemic changes noted to right second toe. Pain upon palpation to right hallux and 3rd toe.                   Mild ischemic changes noted to left hallux. CFT <3s. No purulent drainage noted.     Left foot.                       Laboratory:  CBC:     Recent Labs  Lab  04/25/18  0505   WBC 3.29*   RBC 2.50*   HGB 7.7*   HCT 25.5*      *   MCH 30.8   MCHC 30.2*     CMP:     Recent Labs  Lab 04/25/18  0505   *   CALCIUM 9.6   ALBUMIN 2.0*   PROT 5.9*   *   K 3.7   CO2 24   CL 94*   BUN 45*   CREATININE 3.3*   ALKPHOS 186*   ALT 10   AST 16   BILITOT 0.3       Diagnostic Results:  Xray: Left: No fracture dislocation bone destruction seen.  There is mild DJD.  There are vascular changes suggesting diabetes.  There are spurs on the calcaneus.    Right: There is hardware consistent with talocalcaneal arthrodesis.  There are vascular calcification suggesting diabetes.  No fracture dislocation bone destruction seen.    Clinical Findings:  Dry stable gangrene noted to right hallux and 3rd toe. Ischemic changes noted to right 2nd and left hallux.     Assessment/Plan:     Gangrene    Lv Crocker is a 42 y.o. 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 Ischemic changes to right second toe and left hallux.   -SULMA's within normal limits  -x-ray not concerning for infection  -Painted with betadine, nursing to paint toes with betadine daily  -Per transplant service, recommend against surgical intervention due to inmunosupressed state.  Agree with transplant service.  Toes stable, no surgical intervention indicated at this time.     DC instructions: patient to follow up with podiatry within one week of discharge.  Patient to have toes painted with betadine daily.    Weightbearing Status: WBAT B/L  Offloading Device: DARCO shoe.             Type 1 diabetes mellitus with stage 3 chronic kidney disease    Per primary         Heart transplanted    Per primary            Venice Hatfield MD  Podiatry  [facility] [address]

## 2020-02-14 NOTE — PROGRESS NOTES
PT at the bedside, assisted the patient to the bedside chair. Call light and personal items are within reach.    headache

## 2020-06-29 NOTE — PT/OT/SLP PROGRESS
Duplicate.     Marielos Teresa, RN, BSN     Physical Therapy Treatment    Patient Name:  Lv Crocker   MRN:  [id number]    Recommendations:     Discharge Recommendations:  LTACH (long term acute care hospital)   Discharge Equipment Recommendations:  (TBD)   Barriers to discharge: Inaccessible home and Decreased caregiver support at current functional level     Assessment:     Lv Crocker is a 42 y.o. male admitted with a medical diagnosis of Acute respiratory failure with hypoxia.  He presents with the following impairments/functional limitations:  weakness, impaired endurance, impaired self care skills, impaired functional mobilty, impaired balance, decreased upper extremity function, decreased lower extremity function, impaired cardiopulmonary response to activity, pain, edema, impaired skin.    Rehab Prognosis:  fair; patient would benefit from acute skilled PT services to address these deficits and reach maximum level of function.      Recent Surgery: Procedure(s) (LRB):  INSERTION-CATHETER-PERM-A-CATH (Left)  INSERTION-TUBE-GASTROSTOMY (N/A) 4 Days Post-Op    Plan:     During this hospitalization, patient to be seen 5 x/week to address the above listed problems via gait training, therapeutic activities, therapeutic exercises, neuromuscular re-education  · Plan of Care Expires:  05/06/18   Plan of Care Reviewed with: spouse    Subjective     Communicated with RN prior to session and wife present in room.  Patient found in bed upon PT entry to room, agreeable to treatment.      Chief Complaint: pain  Patient comments/goals: to return home   Pain/Comfort:  · Pain Rating 1: 4/10 (R knee)  · Pain Addressed 1: Distraction, Reposition  · Pain Rating Post-Intervention 1: 4/10    Patients cultural, spiritual, [employer] conflicts given the current situation: none reported     Objective:     Patient found with: telemetry, pulse ox (continuous), blood pressure cuff, tracheostomy, oxygen, central line, peripheral IV, PEG Tube     General  Precautions: Standard, fall, contact, NPO   Orthopedic Precautions:N/A   Braces: N/A     Functional Mobility:  · Bed Mobility:     · Rolling Left:  maximal assistance  · Rolling Right: maximal assistance  · Scooting: maximal assistance  · Supine to Sit: maximal assistance  · Sit to Supine: maximal assistance  · Transfers: not performed   · Gait: not performed       AM-PAC 6 CLICK MOBILITY  Turning over in bed (including adjusting bedclothes, sheets and blankets)?: 2  Sitting down on and standing up from a chair with arms (e.g., wheelchair, bedside commode, etc.): 1  Moving from lying on back to sitting on the side of the bed?: 2  Moving to and from a bed to a chair (including a wheelchair)?: 1  Need to walk in hospital room?: 1  Climbing 3-5 steps with a railing?: 1  Total Score: 8       Therapeutic Activities and Exercises:  Educated pt on PT POC  Educated pt on importance of OOB activity  Pt verbalized understanding    Sitting EOB x 15 minutes to increase tolerance to OOB activity and to create optimal positioning for lung expansion     B UE and B LE AAROM/AROM sitting EOB  LAQ's x 10 reps (25-50% of full ROM)  Hand squeezes x 10 reps   Elbow flexion/extension x 10 reps     Holding head up for 5 seconds x 5 trials   Pt unable to maintain neutral head positioning     Rolling to L and R to assist with sheet change    Patient left HOB elevated with all lines intact, call button in reach and RN notified..    GOALS:    Physical Therapy Goals        Problem: Physical Therapy Goal    Goal Priority Disciplines Outcome Goal Variances Interventions   Physical Therapy Goal     PT/OT, PT Ongoing (interventions implemented as appropriate)     Description:  Goals to be met by: 18     Patient will increase functional independence with mobility by performin. Supine to sit with Moderate Assistance - not met  2. Sit to supine with Moderate Assistance - not met  3. Rolling to Left and Right with Minimal Assistance. - not  met  4. Sit to stand transfer with Moderate Assistance - not met  5. Bed to chair transfer with Maximum Assistance - not met  6. Gait  x 20 feet with Minimal Assistance. - not met                       Time Tracking:     PT Received On: 04/08/18  PT Start Time: 1340     PT Stop Time: 1416  PT Total Time (min): 36 min     Billable Minutes: Therapeutic Activity 15 and Therapeutic Exercise 15    Treatment Type: Treatment  PT/PTA: PT     PTA Visit Number: 0     Teresa Dudley PT, DPT  4/8/2018  [phone]

## 2020-10-18 NOTE — PLAN OF CARE
Problem: Patient Care Overview  Goal: Plan of Care Review  Outcome: Ongoing (interventions implemented as appropriate)  Pt admitted on 3/11/18. Pt trached this admission. Pt on 5L 25% trach collar and tolerating well. SLP worked with patient on wearing speaking valve. Pt tolerated valve well. Pt complaining of pain to toes 9/10. PRN oxycodone and scheduled tylenol given per order. Nitro paste applied to patient's toes per order. Wife at bedside throughout shift. Pt anuric. Midline now functioning well. Pt worked with PT/OT this afternoon. Physical Medicine rehab consulted by primary team. Pt free from falls and injury throughout shift.        Pt c/o right lower quadrant pain started 3 days ago, today more sharp

## 2021-06-22 NOTE — PLAN OF CARE
I called her cardiologist, Dr. Conde.  It went to voicemail.  I asked if we could start Celexa or Lexapro without any concern of QT prolongation?  This is for the patient's anxiety.  I asked that the cardiologist or her nurse leave a message in regards to this.   Problem: Occupational Therapy Goal  Goal: Occupational Therapy Goal  Goals to be met by: 5/8/18 goal extended to be met by: 5/15/18    Pt will follow 75% of motor commands with <2 verbal cues for repetition of task to maximize engagement in grooming task.--MET  Pt will complete feeding with mod A.   Pt will complete with HOB slightly elevated to seated at EOB with mod A in prep for seated grooming task.  Pt will engage in BUE exercises in orders to increase strength to 3+/5 in BUE's to increase engagement in seated grooming task  Pt will sit at EOB for approx 10 minutes with mod A and unilateral UE support to complete grooming task  Pt will complete sit>stand from EOB with bed in lowest position with mod A in prep for transfer to [facility] [place]      Outcome: Ongoing (interventions implemented as appropriate)  All goals remain appropriate. Goals extended to be met by x 1 wk.    Continue POC     Leonor Wagner, OTR/L  [phone]  5/8/2018

## 2021-07-24 NOTE — ASSESSMENT & PLAN NOTE
"Pulmonary Follow-up Note    Patient ID:   Name:             Lorene Haro   YOB: 1975  Age:                 45 y.o.  female   MRN:               [id number]  Referring Provider: Romy Gallardo MD                                                  History of Present Illness:     Pt is a 44 y/o F with PMHx significant for COPD (however FEV1/FVC 89%, FEV1 70% per PFTs 2020), chronic oxygen use and on 2L O2 at home, Significant overlap syndrome with KATHIA/OHS/COPD (failed BiPAP/CPAP and recommended AVAPS), HFpEF with severe pulmonary HTN (RVSP 65 mmHg per TTE 2019), paroxysmal afib (on Warfarin, previously subtherapeutic), morbid obesity, bipolar disorder, schizophrenia, tobacco use disorder, and hypertension who presented 7/22/2021 with shortness of breath x 1 week which had worsened acutely in the last 2 days.She had wheezing, leg swelling, productive cough with yellow phlegm, as well as chest discomfort from persistent coughing. She had previously been admitted for similar symptoms (last discharge was 6/27/2021). Pt reports that she is compliant with all of her home medications including her warfarin. She has tried quitting smoking but \"It's just too hard to quit\". Pulmonary was consulted for multiple readmissions in the context of her 8th COPD exacerbation admission within the last year.      Review of Systems: Review of Systems   Constitutional: Positive for diaphoresis and malaise/fatigue. Negative for weight loss.   HENT: Positive for congestion and sore throat.    Eyes: Negative for double vision.   Respiratory: Positive for cough, sputum production, shortness of breath and wheezing. Negative for hemoptysis.         Yellow phlegm production   Cardiovascular: Positive for leg swelling and PND. Negative for chest pain.        +chest discomfort   Gastrointestinal: Negative for nausea and vomiting.   Musculoskeletal: Negative for falls.   Neurological: Positive for weakness. Negative for sensory " On PDN 7.5mg daily  Can increase insulin requirement   "change, speech change and focal weakness.   Psychiatric/Behavioral: Negative for substance abuse. The patient is nervous/anxious.        Past Medical History:   Past Medical History:   Diagnosis Date   • Asthma    • Bipolar 2 disorder (HCC)    • Chickenpox    • Chronic obstructive pulmonary disease (HCC)    • Hypertension    • On home oxygen therapy    • Other psychological stress    • Schizophrenic disorder (HCC)    • Yeast infection of the skin 4/1/2021       Past Surgical History:   Past Surgical History:   Procedure Laterality Date   • CHOLECYSTECTOMY     • COLECTOMY     • HYSTERECTOMY LAPAROSCOPY     • KNEE ARTHROSCOPY Left        Family History: family history includes Cancer in her sister; No Known Problems in her mother.    Social History:  reports that she has been smoking cigarettes. She has a 2.00 pack-year smoking history. She has never used smokeless tobacco. She reports that she does not drink alcohol and does not use drugs.    Objective:   Vitals/ General Appearance:   Weight/BMI: Body mass index is 53.11 kg/m².  /68   Pulse 88   Temp 36.2 °C (97.1 °F) (Temporal)   Resp 18   Ht 1.549 m (5' 1\")   Wt (!) 127 kg (281 lb 1.4 oz)   SpO2 92%   Vitals:    07/24/21 0713 07/24/21 0730 07/24/21 0757 07/24/21 1153   BP:   110/68    Pulse:  88 62 88   Resp:  20 17 18   Temp:   36.2 °C (97.1 °F)    TempSrc:   Temporal    SpO2: 93% 98% 94% 92%   Weight:       Height:         Oxygen Therapy:  Pulse Oximetry: 92 %, O2 (LPM): 3, O2 Delivery Device: Silicone Nasal Cannula    Constitutional:   Ill-appearing, Well nourished, obese, unkempt, in somewhat acute distress  HENMT:  Normocephalic, Atraumatic, Oropharynx dry mucosal membranes, No oral exudates, Nose normal.  Wearing NC  Eyes:  EOMI, Conjunctiva normal, No discharge.  Neck:  Normal range of motion.  Cardiovascular:  Normal heart rate, Normal rhythm, No murmurs, 2+ edema in b/l LE  Lungs:  Bilateral wheeze. Good respiratory effort. Cough with " productive phlegm.   Abdomen: Bowel sounds normal, Soft, protuberant  Skin: Warm, Dry, Chronic venous stasis changes in b/l LE  Neurologic: Alert & oriented x 4, No focal deficits noted, cranial nerves II through XII are grossly intact.  Psychiatric: Affect normal, Judgment normal, Mood normal.    Labs:  Recent Labs     07/22/21 1222 07/23/21  0614 07/24/21  0419   WBC 4.6* 3.6* 6.9   RBC 4.18* 4.26 4.02*   HEMOGLOBIN 11.8* 11.7* 11.2*   HEMATOCRIT 40.1 41.2 38.8   MCV 95.9 96.7 96.5   MCH 28.2 27.5 27.9   MCHC 29.4* 28.4* 28.9*   RDW 56.5* 55.3* 54.2*   PLATELETCT 210 219 212   MPV 10.9 11.0 11.4     Recent Labs     07/22/21  1222 07/23/21  0614 07/24/21  0419   INR 1.65* 1.59* 2.18*             Recent Labs     07/22/21  1222 07/23/21  0614 07/24/21  0419   SODIUM 147* 143 138   POTASSIUM 4.3 4.8 4.7   CHLORIDE 107 106 102   CO2 32 32 31   GLUCOSE 95 161* 174*   BUN 7* 11 24*     Recent Labs     07/22/21  1222 07/23/21  0614 07/24/21  0419   SODIUM 147* 143 138   POTASSIUM 4.3 4.8 4.7   CHLORIDE 107 106 102   CO2 32 32 31   BUN 7* 11 24*   CREATININE 0.92 0.90 1.14   CALCIUM 8.7 9.2 9.2     No results found for this or any previous visit.      Imaging:   CT-CTA CHEST PULMONARY ARTERY W/ RECONS   Final Result      1.  There is no CT evidence of acute pulmonary embolism.   2.  There are new areas of dependent airspace disease most consistent with atelectasis.   3.  There is no evidence of pulmonary edema, pleural effusion or failure.   4.  Nonspecific mediastinal lymph nodes are relatively unchanged and are likely reactive.            DX-CHEST-PORTABLE (1 VIEW)   Final Result      Cardiomegaly and bilateral interstitial prominence, likely edema. No focal consolidation or pleural effusions.          Hospital Medications:    Current Facility-Administered Medications:   •  guaiFENesin dextromethorphan (ROBITUSSIN DM) 100-10 MG/5ML syrup 5 mL, 5 mL, Oral, Q6HRS PRN, Romy Gallardo M.D., 5 mL at 07/24/21 0839  •  warfarin  (COUMADIN) tablet 6 mg, 6 mg, Oral, ONCE AT 1800, Romy Gallardo M.D.  •  budesonide (PULMICORT) neb susp 0.25 mg, 0.25 mg, Nebulization, BID (RT), Pal Lopez M.D.  •  furosemide (LASIX) tablet 20 mg, 20 mg, Oral, BID DIURETIC, Romy Gallardo M.D., 20 mg at 07/24/21 0538  •  guaiFENesin ER (MUCINEX) tablet 600 mg, 600 mg, Oral, Q12HRS, Padmini Jewell M.D., 600 mg at 07/24/21 0538  •  menthol (HALLS) lozenge 1 Lozenge, 1 Lozenge, Oral, Q2HRS PRN, Padmini Jewell M.D., 1 Lozenge at 07/24/21 0708  •  acetaminophen (Tylenol) tablet 650 mg, 650 mg, Oral, Q6HRS PRN, Mirna Mims, D.O., 650 mg at 07/23/21 2017  •  cloNIDine (CATAPRES) tablet 0.1 mg, 0.1 mg, Oral, Q6HRS PRN, Mirna Mims, D.O.  •  enalaprilat (VASOTEC) injection 1.25 mg, 1.25 mg, Intravenous, Q6HRS PRN, Mirna Mims, D.O.  •  labetalol (NORMODYNE/TRANDATE) injection 10 mg, 10 mg, Intravenous, Q4HRS PRN, Mirna Mims, D.O.  •  ondansetron (ZOFRAN) syringe/vial injection 4 mg, 4 mg, Intravenous, Q4HRS PRN, Mirna Mims, D.O.  •  ondansetron (ZOFRAN ODT) dispertab 4 mg, 4 mg, Oral, Q4HRS PRN, Mirna Mims, D.O.  •  promethazine (PHENERGAN) tablet 12.5-25 mg, 12.5-25 mg, Oral, Q4HRS PRN, Mirna Mims, D.O.  •  promethazine (PHENERGAN) suppository 12.5-25 mg, 12.5-25 mg, Rectal, Q4HRS PRN, Mirna Mims D.O.  •  prochlorperazine (COMPAZINE) injection 5-10 mg, 5-10 mg, Intravenous, Q4HRS PRN, MARCEL SargentO.  •  Respiratory Therapy Consult, , Nebulization, Continuous RT, Mirna Mims D.O.  •  ipratropium-albuterol (DUONEB) nebulizer solution, 3 mL, Nebulization, Q4HRS (RT), MARCEL SargentO., 3 mL at 07/24/21 1150  •  methylPREDNISolone (SOLU-MEDROL) 40 MG injection 40 mg, 40 mg, Intravenous, Q6HRS, TETE Sargent.O., 40 mg at 07/24/21 1125  •  ARIPiprazole (Abilify) tablet 30 mg, 30 mg, Oral, QAM, TETE Sargent.O., 30 mg at 07/24/21 0538  •  atorvastatin (LIPITOR) tablet 20 mg, 20 mg, Oral, DAILY, Mirna GONZALES  MARCEL MimsO., 20 mg at 07/23/21 1710  •  famotidine (PEPCID) tablet 40 mg, 40 mg, Oral, DAILY, MARCEL SargentO., 40 mg at 07/24/21 0538  •  fluticasone (FLONASE) nasal spray  mcg, 1-2 Spray, Nasal, BID, TETE Sargent.O., 100 mcg at 07/24/21 0620  •  montelukast (SINGULAIR) tablet 10 mg, 10 mg, Oral, QHS, TETE Sargent.O., 10 mg at 07/23/21 2011  •  potassium chloride SA (Kdur) tablet 20 mEq, 20 mEq, Oral, DAILY, TETE Sargent.O., 20 mEq at 07/23/21 1710  •  tiotropium (Spiriva Respimat) 2.5 mcg/Act inhalation spray 5 mcg, 5 mcg, Inhalation, QDAILY (RT), MARCEL SargentO., 5 mcg at 07/24/21 0732  •  MD Alert...Warfarin per Pharmacy, , Other, PHARMACY TO DOSE, Mirna Mims D.O.    Current Outpatient Medications:  Medications Prior to Admission   Medication Sig Dispense Refill Last Dose   • fluticasone (FLONASE) 50 MCG/ACT nasal spray Administer 1-2 Sprays into affected nostril(S) 2 times a day.   7/21/2021 at PM   • fexofenadine-pseudoephedrine (ALLEGRA-D)  MG per tablet Take 1 tablet by mouth 2 times a day. 20 tablet 0 7/22/2021 at AM   • famotidine (PEPCID) 40 MG Tab Take 40 mg by mouth every day. Indications: Heartburn   7/21/2021 at AM   • warfarin (COUMADIN) 6 MG Tab Take 6-9 mg by mouth every day. 6mg Monday-Saturday  9mg on Sunday 7/21/2021 at PM   • nitroglycerin (NITROSTAT) 0.4 MG SL Tab Place 1 tablet under the tongue as needed for Chest Pain. 25 tablet 0 PRN at PRN   • albuterol 108 (90 Base) MCG/ACT Aero Soln inhalation aerosol Inhale 2 Puffs every 6 hours as needed for Shortness of Breath. 8.5 g 0 7/21/2021 at AFTERNOON   • acetaminophen (TYLENOL) 500 MG Tab Take 1,000 mg by mouth every 6 hours as needed (pain).   7/21/2021 at AFTERNOON   • furosemide (LASIX) 20 MG Tab Take 1 tablet by mouth 2 times a day. 60 tablet 3 7/21/2021 at PM   • potassium chloride SA (KDUR) 20 MEQ Tab CR Take 1 tablet by mouth every day. 30 tablet 3 7/21/2021 at AM   • montelukast  (SINGULAIR) 10 MG Tab Take 1 Tab by mouth every bedtime. 30 Tab 11 7/21/2021 at PM   • atorvastatin (LIPITOR) 20 MG Tab Take 1 Tab by mouth every day. 30 Tab 1 7/21/2021 at PM   • tiotropium (SPIRIVA HANDIHALER) 18 MCG Cap Inhale 1 Cap by mouth every day. 30 Cap 3 7/21/2021 at AM   • aripiprazole (ABILIFY) 30 MG tablet Take 30 mg by mouth every morning.   7/21/2021 at AM       Medication Allergy:  Allergies   Allergen Reactions   • Amoxicillin Rash and Itching     Tolerates cephalosporins, pt reports that she gets a rash all over her body and gets itchy   • Penicillin G Rash and Itching     pt reports that she gets a rash all over her body and gets itchy       Assessment and Plan:  Principal Problem:    Acute on chronic respiratory failure (HCC) POA: Yes  Active Problems:    Chronic obstructive pulmonary disease with acute exacerbation (HCC) POA: Yes    Bipolar Disorder POA: Yes    KATHIA and COPD overlap syndrome ([facility]) POA: Yes    Class 3 severe obesity in adult ([facility]) POA: Unknown    Tobacco use POA: Yes    Schizophrenia (HCC) POA: Yes    Paroxysmal A-fib ([facility]) POA: Yes    HLD (hyperlipidemia) POA: Yes    Obesity hypoventilation syndrome (HCC) POA: Unknown  Resolved Problems:    * No resolved hospital problems. *    46 y/o F with PMH significant for reported COPD (on 2L O2 at home, FEV1/FVC 89%, FEV1 70% per PFTs 2020) with overlap syndrome of KATHIA/OHS (failed BiPAP/CPAP, put on AVAPS), HFpEF with severe pulmonary HTN (RVSP 65 mmHg per TTE 2019), paroxysmal afib (on Warfarin, previously subtherapeutic), morbid obesity, bipolar disorder, schizophrenia, tobacco use disorder, and hypertension who presented 7/22/2021 with shortness of breath x 1 week which had worsened acutely in the last 2 days.    #Acute on chronic hypoxic and hypercapnic respiratory failure  #Above multifactorial with obesity, obesity hypoventilation syndrome, KATHIA and obstructive airway disease and dependent atelectasis  #Gp II/III pHTN in the above  setting    -Continue on AVAPS setting  -Guaifenesin + cough drops  -Continue azithromycin (last day 7/24)  -Continue duonebs, montelukast, methylprednisolone, pulmicort nebz.  -Can transition to symbicort, tiotropium and albuterol inhaler when better.   -Continue incentive spirometer (10x/hr while awake)  -Agree with palliative care consult; needs lengthy discussion for readmission prevention  -Education on the importance of compliance with AVAPS  -Smoking cessation counseling + resources  -Patient needs to have regular follow up outpatient with pulmonologist and PCP  -Pulmonary rehab after discharge from hospital  -Consider repeat PFTs in outpatient setting    Thank you for this consult.    Pal Lopez MD

## 2021-08-23 NOTE — ASSESSMENT & PLAN NOTE
Per transplant  Avoid hypoglycemia  On PDN and prograf - could lead to prandial elevations and insulin resistance.   No

## 2022-04-12 NOTE — ASSESSMENT & PLAN NOTE
Cataract symptoms i.e., glare, blur discussed. Pt to call if worsening vision or trouble with driving, TV, reading, ADL. UV precautions. Reviewed possibility of future cataract surgery. Pt is a 38yo male with a pmhx of ICM (s/p OHT 11/18/2014), Hashimoto's disease, DM2, cirrhosis, and CKD  [facility] (dx 2/26/2015 on RHC bx; DSA + 3/27; s/p IVIG x4, PLEX x5) now presenting with a 10 day hx of worsening non-productive cough, subjective fevers and chills.Pt afebrile since admission, blood, urine, and respiratory cultures pendng. CXR without consolidation but does show bilateral perihilar edema and a stable right pleural effusion. Labs show a WBC that appears at pt's baseline from previous admissions as well as some CKD.   - etiology of pt's symptoms likely viral, but could be bacterial  - can contionue vancomycin, cefepime, and azithromycin for now while cultures finalize  - regarding his right sided effusion, appears to be stable but no fluid studies have been done on it. Can hold off for now until CT chest can be reviewed, but if pt's conditon worsens would recommend drainage and samples sent to Lab, Path, and Micro.   - will continue to follow.

## 2022-08-10 NOTE — ASSESSMENT & PLAN NOTE
BG goal 140-180  Continue intensive insulin gtt q2hr checks while remains intubated.     If BG continues to drop while on insulin rate 0.1u/hr, will need to add D5 IVF to be able to continue continuous insulin gtt in pt with T1DM. Do not suspend gtt >1 hr, pt is T1DM.    Low c peptide with glc 164. Treat as type 1.   Neg MODE 2013, neg islet cell ab on this admission.   Cannot order insulin ab while on insulin, and ZnT8 unavailable in labs.     Patient Name:  Cait Leary  Patient :  1959  Patient MRN:  [id number]     Primary Oncologist: Jez Donaldson MD  Referring Provider: MAURICIO Soria CNP     Date of Service: 8/10/2022      Chief Complaint:    Chief Complaint   Patient presents with    Follow-up    Chemotherapy     Patient's active problem list:       Liver mass       Lytic bone lesions       Left breast mass    HPI:   Vanessa Jeffries is a 70year-old very pleasant female with medical history significant for osteoarthritis, initially referred to me on 22 for evaluation of her liver lesion and multiple lytic bone lesions. She initially presented to [facility] ER on 22 with severe lower back pain and constipation. Stated that she has been having back pain for about 4 - 6 weeks duration, which becomes severe pain started a week before presentation. CT scan of the abdomen and pelvis done on 2022 showed lesion (6.1 x 8.1 cm) in the hepatic segment 4, concerning for neoplasm. Further evaluation with MRI of the liver is recommended. Extensive lytic metastatic disease in lumbar and thoracic spine and pelvis with most dominant lesion seen at L5 vertebral body. Several small bowel loops segments in the left abdomen demonstrate mild wall thickening, nonspecific may indicate enteritis. Nonspecific partially visualized inflammatory stranding along the pericardium. Suspected small splenic artery aneurysm. CT lumbar spine done on 22 showed extensive multifocal lytic lesions concerning for neoplastic/metastatic disease. Age indeterminant compression fractures at T12 and L4, with mild narrowing of the AP diameter of the canal at L4. She never had colonoscopy before. She had pap smear around . She didn't recall when was her last mammogram.     She denies weight loss. She hasn't been eating much for about 10 days since she has been having nausea.      Since she is found to have multiple lytic bone lesions and liver lesion, she was referred to me for further evaluation. Digital diagnostic bilateral mammogram and bilateral ultrasound done on 6/17/2022 showed left breast mass, highly suspicious for malignancy. Right breast mass at 11:00, 1.5 cm. Mass is highly suspicious for malignancy. Left breast skin thickening. CT chest on 6/20/2022 showed suspicious heterogeneous mass primarily centered in segment 4A of the liver measuring 8.8 cm. Left breast mass with left axillary and subpectoral metastatic disease, suspected bony metastases to thoracolumbar spine and sternum and possible left chest wall invasion. Bone scan on 6/20/2022 showed multifocal osseous metastatic disease. T12 compression fracture, concerning for pathologic fracture. MRI abdomen on 6/25/2022 showed biopsy-proven malignancy in the left breast with suspicion for inflammatory carcinoma given skin and trabecular thickening. There may be invasion of the underlying pectoralis musculature. At least 3 heavily meta stasis, the largest measuring up to 8.7 cm with the others 1 cm or less. Likely metastatic portal hepatic, retroperitoneal and right retrocrural lymph nodes. Trace left pleural effusion. Extensive skeletal metastatic disease. She underwent ultrasound-guided biopsy of the right breast mass at 11:00 and the pathology showed invasive carcinoma with ductal and lobular features. Estrogen receptor and progesterone receptor are positive. HER2 by IHC not overexpressed (score 1+). HER2 by FISH-negative. Left breast mass at 12:00 biopsy revealed grade 2 invasive lobular carcinoma. Estrogen receptor by IHC-positive [greater than 95%, average strong intensity], progesterone receptor-positive [approximately 80%, average moderate intensity], HER2 by IHC-not overexpressed [score 1+] and HER2 by FISH-negative.     CARIS panel did not yield any biomarker results with a benefit of clinic at the dense 70 chance to provide an association with a particular therapy. TMB was low. PD-L1 for Barbara Sanchez was not expressed and MSI was stable. Since she has significant visceral disease, I recommend her to initiate first-line chemotherapy with single agent paclitaxel. It was started on 7/27/22. On August 10, 2022, she presented to me for follow-up. She was initially referred to me for evaluation of liver lesion and multiple bony lesion. She was found to have left breast mass with a distorted left breast on physical examination. Further work-up with diagnostic mammogram, ultrasound, biopsy of bilateral breast masses, CT scan, bone scan and MRI confirmed that she has metastatic hormone receptor positive bilateral breast cancer. Since she has significant visceral disease, I recommend her to initiate first-line chemotherapy with single agent paclitaxel. It was started on 7/20/22. She is tolerating current chemo quite well and she doesn't encounter any major side effects from it. Her left breast and axillary masses are getting smaller and softer. I believe she is achieving good response to chemo and I recommend her to continue with it for now. Bone metastasis - we started zolendronic acid since 7/20/22. She only has minimal symptom and will not do vertebroplasty at this time. Reviewed Caris molecular panel result with her. I requested genetic counseling and testing since she has bilateral metastatic breast cancer. Malignancy related pain - will continue with norco since it has been controlling her pain well. Anxiety and insomnia - on ativan 0.5 mg nightly prn with improvement in her symptom. She is requiring less ativan since starting medical marijuana. She doesn't have any other significant symptoms at today visit. Past Medical History:     Significant for  1. Osteoarthritis    Past Surgery History:    Significant for   1.   Left hip replacement    Social History:   She is a current smoker and she smokes 1 pack per day approximately since she was 23. She denies alcohol drinking or illicit drug abuse. Family History:    Significant for Alzheimer's disease in her parents. No family history of malignancy. No Known Allergies    Review of Systems: \"Per interval history; otherwise 10 point ROS is negative. \"  Her energy level is fair and her sleep is good. She doesn't have fever, chills, night sweats, cough, SOB, chest pain, hemoptysis or palpitations. Her bowels and bladder functions are normal. She denies nausea, vomiting, abdominal pain, diarrhea, constipation, dysuria, loss of appetite or weight loss. She doesn't have neuropathy and she denies bleeding or clotting issues. She has cancer related mild pain in her lower back. Has anxiety. No depression. The rest of the systems are unremarkable. Vital Signs: /63 (Position: Sitting)   Pulse 87   Temp 96.8 °F (36 °C) (Temporal)   Resp 16   Ht 5' 4\" (1.626 m)   Wt 155 lb (70.3 kg)   SpO2 99%   BMI 26.61 kg/m²      Physical Exam:  CONSTITUTIONAL: awake, alert, cooperative, no apparent distress   EYES: pupils equal, round and reactive to light, sclera clear, normal conjunctiva  ENT: Normocephalic, without obvious abnormality, atraumatic  NECK: supple, symmetrical, no jugular venous distension, no carotid bruits   HEMATOLOGIC/LYMPHATIC: no cervical, supraclavicular lymphadenopathy. Left axillary lymphadenopathy noted,   LUNGS: VBS, no wheezes, no increased work of breathing, no rhonchi, clear to auscultation, no crackles,    CARDIOVASCULAR: regular rate and rhythm, normal S1 and S2, no murmur noted  ABDOMEN: normal bowel sounds x 4, soft, non-distended, non-tender, no masses palpated, no hepatosplenomegaly   MUSCULOSKELETAL: full range of motion noted, tone is normal  NEUROLOGIC: awake, alert, oriented to name, place and time. Motor skills grossly intact. SKIN: appears intact, normal skin color, normal texture, normal turgor, no jaundice.    EXTREMITIES: no clubbing, no leg swelling, no LE edema, no cyanosis,   BREASTS: Left breast is distorted by tumor, nipple was displaced close to lower breast crease area, it is getting softer with chemo, right breast nodularity noted,        Labs:  Hematology:  Lab Results   Component Value Date    WBC 3.2 (L) 08/10/2022    RBC 2.96 (L) 08/10/2022    HGB 9.1 (L) 08/10/2022    HCT 28.6 (L) 08/10/2022    MCV 96.6 08/10/2022    MCH 30.7 08/10/2022    MCHC 31.8 (L) 08/10/2022    RDW 16.7 (H) 08/10/2022     08/10/2022    MPV 8.3 08/10/2022    SEGSPCT 43.5 08/10/2022    EOSRELPCT 1.5 08/10/2022    BASOPCT 0.6 08/10/2022    LYMPHOPCT 45.4 (H) 08/10/2022    MONOPCT 9.0 (H) 08/10/2022    SEGSABS 1.4 08/10/2022    EOSABS 0.1 08/10/2022    BASOSABS 0.0 08/10/2022    LYMPHSABS 1.5 08/10/2022    MONOSABS 0.3 08/10/2022    DIFFTYPE AUTOMATED DIFFERENTIAL 08/10/2022     No results found for: ESR  Chemistry:  Lab Results   Component Value Date     08/03/2022    K 4.0 08/03/2022     08/03/2022    CO2 24 08/03/2022    BUN 15 08/03/2022    CREATININE 0.3 (L) 08/03/2022    GLUCOSE 87 08/03/2022    CALCIUM 8.3 08/03/2022    PROT 6.6 08/03/2022    LABALBU 4.4 08/03/2022    BILITOT 0.5 08/03/2022    ALKPHOS 149 (H) 08/03/2022    AST 16 08/03/2022    ALT 15 08/03/2022    LABGLOM >60 08/03/2022    GFRAA >60 08/03/2022    MG 2.3 07/19/2022    POCCA 1.21 07/20/2022    POCGLU 103 (H) 07/20/2022     Lab Results   Component Value Date     (H) 06/14/2022     No components found for: LD  No results found for: TSHHS, T4FREE, FT3  Immunology:  Lab Results   Component Value Date    PROT 6.6 08/03/2022    SPEP  06/14/2022     INTERPRETATION - No monoclonal proteins are detected. SAF    SPEP INTERPRETATION - Within normal limits.  SAF 06/14/2022    ALBUMINELP 3.7 06/14/2022    LABALPH 0.4 06/14/2022    LABALPH 1.3 (H) 46/73/8416    LABALPH DUPLICATE ORDER 86/94/0692    LABALPH DUPLICATE ORDER 98/83/4613    LABBETA 1.1 04/50/9014    LABBETA DUPLICATE ORDER 73/70/1061 GAMGLOB 1.1 06/14/2022     No results found for: Malva Harbour, KLFLCR  No results found for: B2M  Coagulation Panel:  No results found for: PROTIME, INR, APTT, DDIMER  Anemia Panel:  No results found for: XRVMLCRD70, FOLATE  Tumor Markers:  Lab Results   Component Value Date     377 (H) 06/14/2022    CEA 54.5 06/14/2022     25 06/14/2022    LABCA2 110.5 (H) 06/14/2022        Observations:  No data recorded     Assessment   Left breast mass  Liver lesion and multiple bone lytic lesions - concerning for metastatic breast cancer    Plan:  Carol Sun is a 70year-old very pleasant female who initially presented to [facility] ER on 6/12/22 with severe lower back pain and constipation. CT scan of the abdomen and pelvis done on 6/12/2022 showed lesion (6.1 x 8.1 cm) in the hepatic segment 4, concerning for neoplasm. Extensive lytic metastatic disease in lumbar and thoracic spine and pelvis with most dominant lesion seen at L5 vertebral body. CT lumbar spine done on 6/12/22 showed extensive multifocal lytic lesions concerning for neoplastic/metastatic disease. She never had colonoscopy before. She had pap smear around 2020. She didn't recall when was her last mammogram.       Digital diagnostic bilateral mammogram and bilateral ultrasound done on 6/17/2022 showed left breast mass, highly suspicious for malignancy. Right breast mass at 11:00, 1.5 cm. Mass is highly suspicious for malignancy. Left breast skin thickening. CT chest on 6/20/2022 showed suspicious heterogeneous mass primarily centered in segment 4A of the liver measuring 8.8 cm. Left breast mass with left axillary and subpectoral metastatic disease, suspected bony metastases to thoracolumbar spine and sternum and possible left chest wall invasion. Bone scan on 6/20/2022 showed multifocal osseous metastatic disease. T12 compression fracture, concerning for pathologic fracture.     MRI abdomen on 6/25/2022 showed chemotherapy with single agent paclitaxel. It was started on 7/20/22. She is tolerating current chemo quite well and she doesn't encounter any major side effects from it. Her left breast and axillary masses are getting smaller and softer. I believe she is achieving good response to chemo and I recommend her to continue with it for now. Bone metastasis - we started zolendronic acid since 7/20/22. She only has minimal symptom and will not do vertebroplasty at this time. Reviewed [facility] molecular panel result with her. I requested genetic counseling and testing since she has bilateral metastatic breast cancer. Malignancy related pain - will continue with norco since it has been controlling her pain well. Anxiety and insomnia - on ativan 0.5 mg nightly prn with improvement in her symptom. She is requiring less ativan since starting medical marijuana. I answered all her questions and concerns for today. Recent imaging and labs were reviewed and discussed with the patient.

## 2022-10-01 NOTE — NURSING
Motrin/Ibuprofen Dosing  Weight (lbs) Infant drops Childrens Suspension Childrens Chewables Vitaly Strength Chewables    50mg/1.25ml 100mg/5ml 50mg per tablet 100mg per tablet   12-17 lbs 1 dropperful ½ teaspoon     18-23 lbs 2 dropperfuls 1 teaspoon 2 tablets  1 tablet   24-35 lbs 3 dropperfuls 1 ½ teaspoon 3 tablets 1 ½ tablet   36-47 lbs  2 teaspoons 4 tablets 2 tablets   48-59 lbs  2 ½ teaspoons 5 tablets 2 ½ tablets   60-71 lbs  3 teaspoons 6 tablets 3 tablets   72-95 lbs  4 teaspoons 8 tablets 4 tablets   *Motrin/Ibuprofen/Advil not recommended for children under 6 months old. *  Give the weight appropriate dosage every 6 hours as needed for fever higher than 101.0 or for pain. When using Tylenol and Motrin together to treat a fever, start with a dose of Tylenol, then a dose of Motrin 3 hours later, then another dose of Tylenol 3 hours after that, and so on, alternating Motrin and Tylenol until fever reduces. Pt's blood sugar 159. Administered PRN Xanax for anxiety.

## 2022-12-20 NOTE — ASSESSMENT & PLAN NOTE
Contributing Nutrition Diagnosis  Inadequate oral intake    Related to (etiology):  Tracheostomy 2' ARF    Signs and Symptoms (as evidenced by):   40# weight loss in past 3 months, on continues TF - stopped frequently     Interventions/Recommendations (treatment strategy):  See recs    Nutrition Diagnosis Status:   Improving     Klebsiella pneumoniae on culture  Treated with ceftriaxone, completed on 12/16

## 2022-12-27 NOTE — ASSESSMENT & PLAN NOTE
- Suspected infectious cause; with improved symptomatology  - However markers of bacterial infection are negative; normal white count and pre-calictonin level.   - Deescalate the IV antibiotics to oral.   -    Simponi Pregnancy And Lactation Text: The risk during pregnancy and breastfeeding is uncertain with this medication.

## 2023-04-12 NOTE — ASSESSMENT & PLAN NOTE
Continue Lt4 137mcg per G tube    Ideally should hold TF 1 hr before and 1 hr after given LT4      Recheck TFTs in 4 weeks (around 5/6)     Gen.  no acute resp distress  HEENT:  perrl eomi, pharynx mild erythema no edema patent airway, no vesicles.   Lungs:  b/l bs  CVS: S1S2   Abd;  soft no tender no distention  Ext: no edema no erythema  buttocks: erythema of both sides of buttocks. + ttp over area of erythema no abscess / collection palpated. no hemorrhoids. no bleeding   Neuro: aaox3, clear speech  MSK: strength 5/5 b/l upper and lower ext. Gen.  no acute resp distress  HEENT:  perrl eomi, pharynx mild erythema no edema patent airway, no vesicles.   Lungs:  b/l bs  CVS: S1S2   Abd;  soft no tender no distention  Ext: no edema no erythema  buttocks: erythema of both sides of buttocks. + ttp over area of erythema no abscess / collection palpated. no hemorrhoids. no bleeding    no perineal masses / erythema / abscess / fluid collection  Neuro: aaox3, clear speech  MSK: strength 5/5 b/l upper and lower ext.

## 2023-10-08 NOTE — NURSING
Rounds Report: Attended interdisciplinary rounds this morning with the transplant team including SW, physicians, fellows,  mid-level providers, and transplant coordinators.  Discussed plan of care, including psych and podiatry seeing pt tomorrow. Ian will look into pt needing TC and plan on decannulation. Discharge in a few weeks to rehab.     ,

## 2024-02-22 NOTE — ASSESSMENT & PLAN NOTE
- Appreciate Endocrine's recs  - Insulin gtt per endocrine recs   The following labs were labeled with appropriate pt sticker and tubed to lab:     [] Blue     [] Lavender   [] on ice  [x] Green/yellow  [] Green/black [] on ice  [] Grey  [] on ice  [] Yellow  [] Red  [] Pink  [] Type/ Screen  [] ABG  [] VBG    [] COVID-19 swab    [] Rapid  [] PCR  [] Flu swab  [] Peds Viral Panel     [] Urine Sample  [] Fecal Sample  [] Pelvic Cultures  [] Blood Cultures  [] X 2  [] STREP Cultures  [] Wound Cultures

## 2024-03-28 NOTE — PLAN OF CARE
Problem: Patient Care Overview  Goal: Plan of Care Review  Outcome: Ongoing (interventions implemented as appropriate)  POC reviewed w/ pt and family this am to include increasing activity, wound care, blood glucose management, promoting healthy respiratory functioning, hemodialysis, and antibiotics.  Pt not feeling well today, nauseated overnight through this morning - vomited twice today.  Pt's L subclavian drs changed d/t being soiled w/ vomit.  Not able to take AM meds till around 1400 today.  Pt's O2 increased to 4L overnight, weaned to 3L this afternoon - sating mid to high 90's.  Pt strongly encouraged to do IS multiple times per hour - needs reinforcement.  CXR done this am.  Pt repositioned in bed frequently, unable to work with therapy today.  Pt's trach dressing changed today.  Pt's blood glucose stable today, pt's appetite null d/t nausea.  Para scheduled per primary team for tomorrow (5/15/18).       Shanae called office stating pt at office was seen and there is no heartbeat requesting pt be seen by covering physician

## 2024-08-29 NOTE — SUBJECTIVE & OBJECTIVE
"Interval HPI:   Overnight events: bg elevated overnight  Eatin%  Nausea: No  Hypoglycemia and intervention: No  Fever: No  TPN and/or TF: Yes  If yes, type of TF/TPN and rate: novasource renal at 30cc/hr - 8p - 8a  BP (!) 101/59   Pulse (!) 114   Temp 98.4 °F (36.9 °C) (Oral)   Resp 18   Ht 5' 7" (1.702 m)   Wt 80.3 kg (177 lb 0.5 oz)   SpO2 100%   BMI 27.73 kg/m²     Labs Reviewed and Include      Recent Labs  Lab 18  0500   *   CALCIUM 8.2*   ALBUMIN 2.0*   PROT 5.7*   *   K 3.7   CO2 23      BUN 37*   CREATININE 2.9*   ALKPHOS 153*   ALT 8*   AST 11   BILITOT 0.3     Lab Results   Component Value Date    WBC 6.19 2018    HGB 8.8 (L) 2018    HCT 28.5 (L) 2018    MCV 96 2018     (H) 2018       Recent Labs  Lab 18  0600   TSH 4.793*   FREET4 0.77     Lab Results   Component Value Date    HGBA1C 5.1 2018       Nutritional status:   Body mass index is 27.73 kg/m².  Lab Results   Component Value Date    ALBUMIN 2.0 (L) 2018    ALBUMIN 2.3 (L) 2018    ALBUMIN 2.3 (L) 2018     Lab Results   Component Value Date    PREALBUMIN 13 (L) 2018    PREALBUMIN 5 (L) 03/15/2018    PREALBUMIN 18 (L) 2015       Estimated Creatinine Clearance: 33 mL/min (A) (based on SCr of 2.9 mg/dL (H)).    Accu-Checks  Recent Labs      18   1820  18   2152  18   0131  18   0813  18   1200  18   1710  18   2124  18   0210  18   0721  18   1049   POCTGLUCOSE  171*  174*  184*  205*  124*  204*  179*  239*  275*  170*       Current Medications and/or Treatments Impacting Glycemic Control  Immunotherapy:  Immunosuppressants         Stop Route Frequency     tacrolimus capsule 5 mg      -- Oral 2 times daily     mycophenolate capsule 250 mg      -- Oral 2 times daily        Steroids:   Hormones     Start     Stop Route Frequency Ordered    18 0900  predniSONE tablet 5 mg   "    -- Oral Daily 04/30/18 1513        Pressors:    Autonomic Drugs     Start     Stop Route Frequency Ordered    04/30/18 0705  midodrine tablet 15 mg      -- Oral As needed (PRN) 04/30/18 0707    04/25/18 0900  midodrine tablet 10 mg      -- Oral 3 times daily 04/25/18 0650        Hyperglycemia/Diabetes Medications: Antihyperglycemics     Start     Stop Route Frequency Ordered    05/06/18 0815  insulin aspart U-100 pen 3 Units      -- SubQ 3 times daily with meals 05/06/18 0803    04/26/18 2000  insulin aspart U-100 pen 0-5 Units      -- SubQ Before meals & nightly PRN 04/26/18 1904    04/11/18 0015  insulin regular (Humulin R) 100 Units in sodium chloride 0.9% 100 mL infusion     Question:  Insulin Rate Adjustment (DO NOT MODIFY ANSWER)  Answer:  [email or web address]    -- IV Continuous 04/10/18 2310         Initiate Treatment: Mineral Based Sunscreen with a minimum of 30 SPF. Reapplication every 2 hrs or sooner depending on activity.\\nMake sure to discard  sunscreen. Detail Level: Zone

## 2025-07-10 NOTE — TELEPHONE ENCOUNTER
Spoke with pt's wife, she reports the patient is maintaining a high output of 800-900mL every other day. She denies any other complaints at this time.  Dr James requested to see the pt back in 1 month, scheduled for 2/16 with CXR and sent a message to the pt to confirm date/time. Will work on getting a case for the patient. Still unable to find an in network provider for the pleurx drainage kits via the patient's insurance.    The site was marked. Prepped: left neck. Prepped with: ChloraPrep. The patient was draped.

## (undated) DEVICE — SUT SILK 2-0 SH 18IN BLACK

## (undated) DEVICE — SUT 2-0 12-18IN SILK

## (undated) DEVICE — GAUZE SPONGE 4X4 12PLY

## (undated) DEVICE — SUT SILK 3-0 SH 18IN BLACK

## (undated) DEVICE — DRESSING TRANS 4X4 TEGADERM

## (undated) DEVICE — DRESSING TELFA PAD N ADH 2X3

## (undated) DEVICE — BLADE SURG CARBON STEEL SZ11

## (undated) DEVICE — DRAPE ABDOMINAL TIBURON 14X11

## (undated) DEVICE — SOL 0.9% NACL IRRI.IN STERIL

## (undated) DEVICE — SEE MEDLINE ITEM 152622

## (undated) DEVICE — SEE MEDLINE ITEM 146313

## (undated) DEVICE — DRAPE C ARM 42 X 120 10/BX

## (undated) DEVICE — SPONGE LAP 18X18 PREWASHED

## (undated) DEVICE — Device

## (undated) DEVICE — SUT 2/0 30IN SILK BLK BRAI

## (undated) DEVICE — ELECTRODE BLADE INSULATED 1 IN

## (undated) DEVICE — SUT ETHILON 2-0 PSLX 30IN

## (undated) DEVICE — TAPE SILK 3IN

## (undated) DEVICE — SET DECANTER MEDICHOICE

## (undated) DEVICE — SUT CHROMIC 3-0 SH 27IN GUT

## (undated) DEVICE — SUT 1 36IN PDS II VIO MONO

## (undated) DEVICE — TRAY MINOR GEN SURG

## (undated) DEVICE — SUT SILK 0 STRANDS 30IN BLK

## (undated) DEVICE — SEE MEDLINE ITEM 157117

## (undated) DEVICE — HOLDER TRACH TUBE X-LARGE

## (undated) DEVICE — PACK SET UP CONVERTORS

## (undated) DEVICE — PACK DRAPE UNIVERSAL CONVERTOR

## (undated) DEVICE — SUT 4-0 VICRYL / SH

## (undated) DEVICE — NDL HYPO REG 25G X 1 1/2

## (undated) DEVICE — SHEET EENT SPLIT

## (undated) DEVICE — SEE MEDLINE ITEM 152487

## (undated) DEVICE — SEE MEDLINE ITEM 146417

## (undated) DEVICE — GOWN SMART IMP BREATHABLE XXLG

## (undated) DEVICE — SEE L#95700

## (undated) DEVICE — DRESSING TEGADERM IV 3.5 X 4.5

## (undated) DEVICE — DRESSING ANTIMICROBIAL 1 INCH

## (undated) DEVICE — GOWN SURGICAL X-LARGE

## (undated) DEVICE — GAUZE SPONGE PEANUT STRL

## (undated) DEVICE — ELECTRODE REM PLYHSV RETURN 9

## (undated) DEVICE — SEE MEDLINE ITEM 146347

## (undated) DEVICE — CONTAINER SPECIMEN STRL 4OZ

## (undated) DEVICE — SOL 9P NACL IRR PIC IL

## (undated) DEVICE — SEE MEDLINE ITEM 146416

## (undated) DEVICE — FORCEP BIOPSY RADIAL JW 4

## (undated) DEVICE — SUT VICRYL 3-0 27 SH

## (undated) DEVICE — SUT PROLENE 2-0 30 SH

## (undated) DEVICE — STAPLER SKIN ROTATING HEAD

## (undated) DEVICE — SPONGE IV DRAIN 4X4 STERILE

## (undated) DEVICE — COVER LIGHT HANDLE 80/CA

## (undated) DEVICE — DRESSING ADH ISLAND 3.6 X 14

## (undated) DEVICE — TAPE MEDIPORE 4IN X 2YDS

## (undated) DEVICE — DRAPE INCISE IOBAN 2 23X17IN

## (undated) DEVICE — DRESSING ABSRBNT ISLAND 3.6X8

## (undated) DEVICE — SEE MEDLINE ITEM 154981

## (undated) DEVICE — BITE BLOCK ADULT JUMBO ENDO W/

## (undated) DEVICE — ADHESIVE MASTISOL VIAL 48/BX

## (undated) DEVICE — TRAY FOLEY 16FR INFECTION CONT

## (undated) DEVICE — SYR 30CC LUER LOCK

## (undated) DEVICE — CLOSURE SKIN STERI STRIP 1/2X4

## (undated) DEVICE — SOL NACL 0.9% INJ PF/50151